# Patient Record
Sex: FEMALE | Race: WHITE | Employment: OTHER | URBAN - METROPOLITAN AREA
[De-identification: names, ages, dates, MRNs, and addresses within clinical notes are randomized per-mention and may not be internally consistent; named-entity substitution may affect disease eponyms.]

---

## 2018-10-22 ENCOUNTER — HOSPITAL ENCOUNTER (OUTPATIENT)
Dept: WOUND CARE | Age: 43
Discharge: HOME | End: 2018-10-22
Attending: PODIATRIST

## 2018-10-22 VITALS
SYSTOLIC BLOOD PRESSURE: 138 MMHG | HEART RATE: 115 BPM | DIASTOLIC BLOOD PRESSURE: 92 MMHG | WEIGHT: 165 LBS | BODY MASS INDEX: 26.63 KG/M2 | RESPIRATION RATE: 16 BRPM

## 2018-10-22 DIAGNOSIS — R52 PAIN: Primary | ICD-10-CM

## 2018-10-22 PROCEDURE — 11045 DBRDMT SUBQ TISS EACH ADDL: CPT

## 2018-10-22 PROCEDURE — 11042 DBRDMT SUBQ TIS 1ST 20SQCM/<: CPT

## 2018-10-22 PROCEDURE — 99213 OFFICE O/P EST LOW 20 MIN: CPT

## 2018-10-22 RX ORDER — LIDOCAINE HYDROCHLORIDE 40 MG/ML
SOLUTION TOPICAL ONCE
Status: DISCONTINUED | OUTPATIENT
Start: 2018-10-22 | End: 2018-10-23 | Stop reason: HOSPADM

## 2018-10-22 RX ORDER — OXYCODONE HYDROCHLORIDE AND ACETAMINOPHEN 5; 325 MG/1; MG/1
1 TABLET ORAL EVERY 6 HOURS PRN
Qty: 20 TABLET | Refills: 0 | Status: SHIPPED | OUTPATIENT
Start: 2018-10-22 | End: 2018-10-27

## 2018-10-22 RX ORDER — GABAPENTIN 100 MG/1
100 CAPSULE ORAL 3 TIMES DAILY
Status: ON HOLD | COMMUNITY
End: 2020-04-25

## 2018-10-22 ASSESSMENT — PAIN DESCRIPTION - PAIN TYPE: TYPE: ACUTE PAIN

## 2018-10-22 ASSESSMENT — PAIN DESCRIPTION - FREQUENCY: FREQUENCY: INTERMITTENT

## 2018-10-22 ASSESSMENT — PAIN DESCRIPTION - LOCATION: LOCATION: LEG

## 2018-10-22 ASSESSMENT — PAIN SCALES - GENERAL: PAINLEVEL_OUTOF10: 4

## 2018-10-22 ASSESSMENT — PAIN DESCRIPTION - ORIENTATION: ORIENTATION: RIGHT

## 2018-10-29 ENCOUNTER — HOSPITAL ENCOUNTER (OUTPATIENT)
Dept: WOUND CARE | Age: 43
Discharge: HOME | End: 2018-10-29
Attending: PODIATRIST

## 2018-10-29 VITALS — DIASTOLIC BLOOD PRESSURE: 99 MMHG | RESPIRATION RATE: 16 BRPM | HEART RATE: 129 BPM | SYSTOLIC BLOOD PRESSURE: 153 MMHG

## 2018-10-29 PROCEDURE — 11042 DBRDMT SUBQ TIS 1ST 20SQCM/<: CPT

## 2018-10-29 RX ORDER — LIDOCAINE HYDROCHLORIDE 40 MG/ML
SOLUTION TOPICAL ONCE
Status: DISCONTINUED | OUTPATIENT
Start: 2018-10-29 | End: 2018-10-30 | Stop reason: HOSPADM

## 2018-10-29 NOTE — PLAN OF CARE
Problem: Wound:  Goal: Will show signs of wound healing; wound closure and no evidence of infection  Will show signs of wound healing; wound closure and no evidence of infection   Outcome: Ongoing  Discharge instructions given. Patient verbalized understanding. Return to [facility] in 1 week.     [] antibiotics    [] X-ray     [] Culture   [x] Debridement      [] HBO Evaluation    [] LABS   [] Vascular Studies []

## 2018-10-29 NOTE — PROGRESS NOTES
2-4 once or twice per week.  lisinopril (PRINIVIL) 10 MG tablet Take 1 tablet by mouth daily. 30 tablet 3    LORazepam (ATIVAN) 0.5 MG tablet Take 1 tablet by mouth every 8 hours as needed for Anxiety. 30 tablet 1    insulin glargine (LANTUS) 100 UNIT/ML injection Inject 45 Units into the skin daily. 5 Pen 3    glucose blood VI test strips (VICTORY AGM-4000 TEST) strip Test four times daily until controlled and then three times daily. Code 250.02  ONE TOUCH ULTRA MONITOR 100 strip 12    insulin lispro (HUMALOG) 100 UNIT/ML injection Inject  into the skin 3 times daily (before meals). SLIDING SCALE SC BEFORE MEALS      Insulin Pen Needle (B-D UF III MINI PEN NEEDLES) 31G X 5 MM MISC by Does not apply route. 100 each 6     No current facility-administered medications on file prior to encounter. REVIEW OF SYSTEMS    Pertinent items are noted in HPI. Objective:      BP (!) 153/99   Pulse 129   Resp 16     PHYSICAL EXAM    General Appearance: alert and oriented to person, place and time, well-developed and well-nourished, in no acute distress  Skin: warm and dry and dorsal R foot ulcer with fibrogranular base. Dry scaling skin noted to R foot. Cardiovascular: normal rate and intact distal pulses  Extremities: no cyanosis, no clubbing and mild edema to foot. Erythema decreased from previous hospital stay. Musculoskeletal: normal range of motion, no joint swelling, deformity or tenderness  Neurologic: gait and coordination normal, speech normal and decreased protective sensation.       Assessment:     Patient Active Problem List   Diagnosis    Diabetes mellitus type 2, uncontrolled ([address]    Mixed hyperlipidemia    Migraine headache    Labial abscess    Hypokalemia    Leukocytosis    Anemia       Procedure Note    Performed by: Maria Eugenia Jackson DPM    Consent obtained: Yes    Time out taken:  Yes    Pain Control: Anesthetic  Anesthetic: 4% Lidocaine Liquid Topical to the treatment plan is paramount to successful healing and prevention of further ulceration and/or infection     Discharge Treatment       With each dressing change, rinse wounds with 0.9% Saline. (May use wound wash or soft contact solution. Both can be purchased at a local drug store). If unable to obtain saline, may use a gentle soap and water. Dressing care: Danielle Obrien on wound in clinic. Alginate ag, dry dressing- change every 2 days.     Written Patient Discharge Instructions Given            Electronically signed by Eliot Saxena DPM on 10/29/2018 at 2:15 PM

## 2018-11-05 ENCOUNTER — HOSPITAL ENCOUNTER (OUTPATIENT)
Dept: WOUND CARE | Age: 43
Discharge: HOME | End: 2018-11-05
Attending: PODIATRIST

## 2018-11-05 VITALS
WEIGHT: 176 LBS | BODY MASS INDEX: 28.41 KG/M2 | DIASTOLIC BLOOD PRESSURE: 84 MMHG | SYSTOLIC BLOOD PRESSURE: 135 MMHG | TEMPERATURE: 97.7 F | HEART RATE: 111 BPM

## 2018-11-05 PROCEDURE — 11042 DBRDMT SUBQ TIS 1ST 20SQCM/<: CPT

## 2018-11-05 RX ORDER — LIDOCAINE HYDROCHLORIDE 40 MG/ML
SOLUTION TOPICAL ONCE
Status: DISCONTINUED | OUTPATIENT
Start: 2018-11-05 | End: 2018-11-06 | Stop reason: HOSPADM

## 2018-11-05 ASSESSMENT — PAIN DESCRIPTION - PAIN TYPE: TYPE: CHRONIC PAIN

## 2018-11-05 ASSESSMENT — PAIN DESCRIPTION - ONSET: ONSET: ON-GOING

## 2018-11-05 ASSESSMENT — PAIN SCALES - GENERAL: PAINLEVEL_OUTOF10: 2

## 2018-11-05 ASSESSMENT — PAIN DESCRIPTION - DESCRIPTORS: DESCRIPTORS: ACHING

## 2018-11-05 ASSESSMENT — PAIN DESCRIPTION - LOCATION: LOCATION: LEG

## 2018-11-05 ASSESSMENT — PAIN DESCRIPTION - ORIENTATION: ORIENTATION: RIGHT

## 2018-11-05 ASSESSMENT — PAIN DESCRIPTION - FREQUENCY: FREQUENCY: INTERMITTENT

## 2018-11-05 NOTE — PROGRESS NOTES
prn:  Possibly 2-4 once or twice per week.  lisinopril (PRINIVIL) 10 MG tablet Take 1 tablet by mouth daily. 30 tablet 3    insulin glargine (LANTUS) 100 UNIT/ML injection Inject 45 Units into the skin daily. 5 Pen 3    glucose blood VI test strips (VICTORY AGM-4000 TEST) strip Test four times daily until controlled and then three times daily. Code 250.02  ONE TOUCH ULTRA MONITOR 100 strip 12    insulin lispro (HUMALOG) 100 UNIT/ML injection Inject  into the skin 3 times daily (before meals). SLIDING SCALE SC BEFORE MEALS      Insulin Pen Needle (B-D UF III MINI PEN NEEDLES) 31G X 5 MM MISC by Does not apply route. 100 each 6     No current facility-administered medications on file prior to encounter. REVIEW OF SYSTEMS    Pertinent items are noted in HPI. Objective:      /84   Pulse 111   Temp 97.7 °F (36.5 °C) (Oral)   Wt 176 lb (79.8 kg)   BMI 28.41 kg/m²     PHYSICAL EXAM    General Appearance: alert and oriented to person, place and time, well-developed and well-nourished, in no acute distress  Skin: warm and dry and dorsal R foot ulcer with fibrogranular base. Dry scaling skin noted to R foot. Cardiovascular: normal rate and intact distal pulses  Extremities: no cyanosis, no clubbing and mild edema to foot. Erythema decreased from previous hospital stay.   Musculoskeletal: normal range of motion, no joint swelling, deformity or tenderness  Neurologic: gait and coordination normal, speech normal and decreased protective sensation.         Assessment:     Patient Active Problem List   Diagnosis    Diabetes mellitus type 2, uncontrolled ([address]    Mixed hyperlipidemia    Migraine headache    Labial abscess    Hypokalemia    Leukocytosis    Anemia       Procedure Note    Performed by: Joe Casillas DPM    Consent obtained: Yes    Time out taken:  Yes    Pain Control: Anesthetic  Anesthetic: 4% Lidocaine Liquid Topical     Debridement:Excisional Debridement    Using curette

## 2018-11-12 ENCOUNTER — HOSPITAL ENCOUNTER (OUTPATIENT)
Dept: WOUND CARE | Age: 43
Discharge: HOME | End: 2018-11-12
Attending: PODIATRIST

## 2018-11-12 VITALS — SYSTOLIC BLOOD PRESSURE: 167 MMHG | DIASTOLIC BLOOD PRESSURE: 92 MMHG | HEART RATE: 113 BPM | RESPIRATION RATE: 16 BRPM

## 2018-11-12 PROCEDURE — 11042 DBRDMT SUBQ TIS 1ST 20SQCM/<: CPT

## 2018-11-12 RX ORDER — LIDOCAINE HYDROCHLORIDE 40 MG/ML
SOLUTION TOPICAL ONCE
Status: DISCONTINUED | OUTPATIENT
Start: 2018-11-12 | End: 2018-11-13 | Stop reason: HOSPADM

## 2018-11-12 ASSESSMENT — PAIN DESCRIPTION - LOCATION: LOCATION: FOOT

## 2018-11-12 ASSESSMENT — PAIN DESCRIPTION - PAIN TYPE: TYPE: CHRONIC PAIN

## 2018-11-12 ASSESSMENT — PAIN SCALES - GENERAL: PAINLEVEL_OUTOF10: 7

## 2018-11-12 NOTE — PROGRESS NOTES
Possibly 2-4 once or twice per week.  lisinopril (PRINIVIL) 10 MG tablet Take 1 tablet by mouth daily. 30 tablet 3    insulin glargine (LANTUS) 100 UNIT/ML injection Inject 45 Units into the skin daily. 5 Pen 3    glucose blood VI test strips (VICTORY AGM-4000 TEST) strip Test four times daily until controlled and then three times daily. Code 250.02  ONE TOUCH ULTRA MONITOR 100 strip 12    insulin lispro (HUMALOG) 100 UNIT/ML injection Inject  into the skin 3 times daily (before meals). SLIDING SCALE SC BEFORE MEALS      Insulin Pen Needle (B-D UF III MINI PEN NEEDLES) 31G X 5 MM MISC by Does not apply route. 100 each 6     No current facility-administered medications on file prior to encounter. REVIEW OF SYSTEMS    Pertinent items are noted in HPI. Objective:      BP (!) 167/92   Pulse 113   Resp 16     PHYSICAL EXAM    General Appearance: alert and oriented to person, place and time, well-developed and well-nourished, in no acute distress  Skin: warm and dry and dorsal R foot ulcer with fibrogranular base. Dry scaling skin noted to R foot. Wound base is more granular. Cardiovascular: normal rate and intact distal pulses  Extremities: no cyanosis, no clubbing and mild edema to foot. Erythema decreased from previous hospital stay. Musculoskeletal: normal range of motion, no joint swelling, deformity or tenderness  Neurologic: gait and coordination normal, speech normal and decreased protective sensation.       Assessment:     Patient Active Problem List   Diagnosis    Diabetes mellitus type 2, uncontrolled ([facility] [address]    Mixed hyperlipidemia    Migraine headache    Labial abscess    Hypokalemia    Leukocytosis    Anemia       Procedure Note    Performed by: Joe Casillas DPM    Consent obtained: Yes    Time out taken:  Yes    Pain Control: Anesthetic  Anesthetic: 4% Lidocaine Liquid Topical     Debridement:Excisional Debridement    Using curette the wound was sharply debrided    down

## 2018-11-19 ENCOUNTER — HOSPITAL ENCOUNTER (OUTPATIENT)
Dept: WOUND CARE | Age: 43
Discharge: HOME | End: 2018-11-19
Attending: PODIATRIST

## 2018-11-19 VITALS — RESPIRATION RATE: 16 BRPM | SYSTOLIC BLOOD PRESSURE: 140 MMHG | DIASTOLIC BLOOD PRESSURE: 86 MMHG | HEART RATE: 113 BPM

## 2018-11-19 PROCEDURE — 11042 DBRDMT SUBQ TIS 1ST 20SQCM/<: CPT

## 2018-11-19 RX ORDER — LIDOCAINE HYDROCHLORIDE 40 MG/ML
SOLUTION TOPICAL ONCE
Status: DISCONTINUED | OUTPATIENT
Start: 2018-11-19 | End: 2018-11-20 | Stop reason: HOSPADM

## 2018-11-26 ENCOUNTER — HOSPITAL ENCOUNTER (OUTPATIENT)
Dept: WOUND CARE | Age: 43
Discharge: HOME | End: 2018-11-26
Attending: PODIATRIST

## 2018-11-26 VITALS
RESPIRATION RATE: 16 BRPM | HEART RATE: 112 BPM | DIASTOLIC BLOOD PRESSURE: 83 MMHG | SYSTOLIC BLOOD PRESSURE: 131 MMHG | TEMPERATURE: 97.2 F

## 2018-11-26 PROCEDURE — 11042 DBRDMT SUBQ TIS 1ST 20SQCM/<: CPT

## 2018-11-26 RX ORDER — LIDOCAINE HYDROCHLORIDE 40 MG/ML
SOLUTION TOPICAL ONCE
Status: DISCONTINUED | OUTPATIENT
Start: 2018-11-26 | End: 2018-11-27 | Stop reason: HOSPADM

## 2018-11-26 NOTE — PROGRESS NOTES
prn:  Possibly 2-4 once or twice per week.  lisinopril (PRINIVIL) 10 MG tablet Take 1 tablet by mouth daily. 30 tablet 3    insulin glargine (LANTUS) 100 UNIT/ML injection Inject 45 Units into the skin daily. 5 Pen 3    glucose blood VI test strips (VICTORY AGM-4000 TEST) strip Test four times daily until controlled and then three times daily. Code 250.02  ONE TOUCH ULTRA MONITOR 100 strip 12    insulin lispro (HUMALOG) 100 UNIT/ML injection Inject  into the skin 3 times daily (before meals). SLIDING SCALE SC BEFORE MEALS      Insulin Pen Needle (B-D UF III MINI PEN NEEDLES) 31G X 5 MM MISC by Does not apply route. 100 each 6     No current facility-administered medications on file prior to encounter. REVIEW OF SYSTEMS    Pertinent items are noted in HPI. Objective:      /83   Pulse 112   Temp 97.2 °F (36.2 °C) (Oral)   Resp 16     PHYSICAL EXAM    General Appearance: alert and oriented to person, place and time, well-developed and well-nourished, in no acute distress  Skin: warm and dry and dorsal R foot ulcer with fibrogranular base. Dry scaling skin noted to R foot. Wound base is more granular. Cardiovascular: normal rate and intact distal pulses  Extremities: no cyanosis, no clubbing and mild edema to foot. Erythema decreased from previous hospital stay. Musculoskeletal: normal range of motion, no joint swelling, deformity or tenderness  Neurologic: gait and coordination normal, speech normal and decreased protective sensation.       Assessment:     Patient Active Problem List   Diagnosis    Diabetes mellitus type 2, uncontrolled ([facility] [address]    Mixed hyperlipidemia    Migraine headache    Labial abscess    Hypokalemia    Leukocytosis    Anemia       Procedure Note    Performed by: Joe Casillas DPM    Consent obtained: Yes    Time out taken:  Yes    Pain Control: Anesthetic  Anesthetic: 4% Lidocaine Liquid Topical     Debridement:Excisional Debridement    Using curette the wound was sharply debrided    down through and including the removal of epidermis, dermis and subcutaneous tissue. Devitalized Tissue Debrided:  fibrin, biofilm, slough and callus    Pre Debridement Measurements:  Are located in the Wound Documentation Flow Sheet    Wound #: 1     Post  Debridement Measurements:  Wound 10/22/18 Foot Right;Dorsal;Lateral #1 (Active)   Wound Image   11/19/2018  1:35 PM   Wound Type Wound 11/26/2018  1:29 PM   Wound Diabetic Gr[place] 2 11/26/2018  1:29 PM   Dressing/Treatment Dry dressing 11/19/2018  2:08 PM   Wound Cleansed Rinsed/Irrigated with saline 11/26/2018  1:50 PM   Wound Length (cm) 2.1 cm 11/26/2018  1:50 PM   Wound Width (cm) 4 cm 11/26/2018  1:50 PM   Wound Depth (cm)  0.1 11/26/2018  1:50 PM   Calculated Wound Size (cm^2) (l*w) 8.4 cm^2 11/26/2018  1:50 PM   Change in Wound Size % (l*w) 70.63 11/26/2018  1:50 PM   Wound Assessment Bleeding 11/26/2018  1:50 PM   Drainage Amount Moderate 11/26/2018  1:50 PM   Drainage Description Yellow 11/26/2018  1:29 PM   Odor None 11/26/2018  1:29 PM   Marianna-wound Assessment Dry 11/26/2018  1:29 PM   [place]%Wound Bed 80 11/26/2018  1:29 PM   Red%Wound Bed 0 11/26/2018  1:29 PM   Yellow%Wound Bed 20 11/26/2018  1:29 PM   Black%Wound Bed 0 11/26/2018  1:29 PM   Purple%Wound Bed 0 11/26/2018  1:29 PM   Other%Wound Bed 0 11/26/2018  1:29 PM   Time out Yes 11/26/2018  1:50 PM   Op First Treatment Date 10/22/18 10/22/2018  1:10 PM   Number of days: 35       Incision 10/05/13 Other (Comment) Left (Active)   Number of days: 1878           Total Surface Area Debrided:  8.4 sq cm     Percentage of wound debrided 100%    Bleeding:  Minimal    Hemostasis Achieved:  by pressure    Procedural Pain:  0  / 10     Post Procedural Pain:  0 / 10     Response to treatment:  Well tolerated by patient. Plan:     The nature of the patient's condition was explained in depth.  The patient was informed that their compliance to the treatment plan is

## 2018-11-26 NOTE — PLAN OF CARE
Problem: Wound:  Goal: Will show signs of wound healing; wound closure and no evidence of infection  Will show signs of wound healing; wound closure and no evidence of infection   Outcome: Ongoing  Discharge instructions given. Patient verbalized understanding. Return to [facility] in 2 weeks.     [] antibiotics    [] X-ray     [] Culture   [x] Debridement      [] HBO Evaluation    [] LABS   [] Vascular Studies []

## 2018-12-10 ENCOUNTER — HOSPITAL ENCOUNTER (OUTPATIENT)
Dept: WOUND CARE | Age: 43
Discharge: HOME | End: 2018-12-10
Attending: PODIATRIST

## 2018-12-10 VITALS
RESPIRATION RATE: 16 BRPM | SYSTOLIC BLOOD PRESSURE: 147 MMHG | DIASTOLIC BLOOD PRESSURE: 86 MMHG | TEMPERATURE: 97.1 F | HEIGHT: 66 IN | BODY MASS INDEX: 28.41 KG/M2 | HEART RATE: 112 BPM

## 2018-12-10 PROCEDURE — 11042 DBRDMT SUBQ TIS 1ST 20SQCM/<: CPT

## 2018-12-10 RX ORDER — LIDOCAINE HYDROCHLORIDE 40 MG/ML
SOLUTION TOPICAL ONCE
Status: DISCONTINUED | OUTPATIENT
Start: 2018-12-10 | End: 2018-12-11 | Stop reason: HOSPADM

## 2018-12-10 NOTE — PLAN OF CARE
Problem: Wound:  Goal: Will show signs of wound healing; wound closure and no evidence of infection  Will show signs of wound healing; wound closure and no evidence of infection   Outcome: Ongoing  Discharge instructions given. Patient verbalized understanding. Return to [facility] in 1 week.       [] antibiotics    [] X-ray     [] Culture   [x] Debridement      [] HBO Evaluation    [] LABS   [] Vascular Studies []

## 2018-12-10 NOTE — PROGRESS NOTES
Jose J Burrows  Progress Note and Procedure Note      Connie Oshea  AGE: 37 y.o. GENDER: female  : 1975  TODAY'S DATE:  12/10/2018    Subjective:     Chief Complaint   Patient presents with    Wound Check     right foot         HISTORY of PRESENT ILLNESS HPI     Connie Oshea is a 37 y.o. female who presents today for wound evaluation. History of Wound: Dorsal R foot ulcer  Wound Pain:  intermittent  Severity:  1 / 10   Wound Type:  diabetic  Modifying Factors:  diabetes, poor glucose control and shear force  Associated Signs/Symptoms:  drainage and pain        PAST MEDICAL HISTORY        Diagnosis Date    [facility] mellitus out of [facility] [address]     Diabetes mellitus, type II ([facility] [address]         Generalized headaches     Infertility     Insomnia     chronic vs lack of time spent to sleep    Migraine headache 2011    Mixed hyperlipidemia     Otitis media     h/o recurrent    Pelvic abscess in female 10/5/2013    Pneumonia     2004 approx. PAST SURGICAL HISTORY    Past Surgical History:   Procedure Laterality Date    CERVIX SURGERY      laser tx for dysplasia;       FAMILY HISTORY    Family History   Problem Relation Age of Onset    Diabetes Mother     Diabetes Father     High Blood Pressure Father     Cancer Father         colon    Diabetes Sister     Diabetes Paternal Grandmother        SOCIAL HISTORY    Social History   Substance Use Topics    Smoking status: Current Every Day Smoker     Packs/day: 1.00     Types: Cigarettes    Smokeless tobacco: Never Used    Alcohol use No      Comment: Very Rare       ALLERGIES    Allergies   Allergen Reactions    Amoxicillin     Levofloxacin        MEDICATIONS    Current Outpatient Prescriptions on File Prior to Encounter   Medication Sig Dispense Refill    gabapentin (NEURONTIN) 100 MG capsule Take 100 mg by mouth 3 times daily. Peri Mcgee Naproxen Sodium (ALEVE) 220 MG CAPS Take 220 mg by mouth as needed.  Takes nature of the patient's condition was explained in depth. The patient was informed that their compliance to the treatment plan is paramount to successful healing and prevention of further ulceration and/or infection     Discharge Treatment       With each dressing change, rinse wounds with 0.9% Saline. (May use wound wash or soft contact solution. Both can be purchased at a local drug store). If unable to obtain saline, may use a gentle soap and water.     Dressing care: Collagen powder in wound in clinic. At home: Use Collagen until gone then resume Alginate ag, dry dressing- change every 2 days.     Written Patient Discharge Instructions Given            Electronically signed by Maria Eugenia Jackson DPM on 12/10/2018 at 1:48 PM

## 2018-12-24 ENCOUNTER — HOSPITAL ENCOUNTER (OUTPATIENT)
Dept: WOUND CARE | Age: 43
Discharge: HOME | End: 2018-12-24
Attending: PODIATRIST

## 2018-12-24 VITALS
TEMPERATURE: 97.4 F | BODY MASS INDEX: 28.41 KG/M2 | DIASTOLIC BLOOD PRESSURE: 87 MMHG | WEIGHT: 176 LBS | SYSTOLIC BLOOD PRESSURE: 145 MMHG

## 2018-12-24 PROCEDURE — 11042 DBRDMT SUBQ TIS 1ST 20SQCM/<: CPT

## 2018-12-24 RX ORDER — LIDOCAINE HYDROCHLORIDE 40 MG/ML
SOLUTION TOPICAL ONCE
Status: DISCONTINUED | OUTPATIENT
Start: 2018-12-24 | End: 2018-12-25 | Stop reason: HOSPADM

## 2018-12-24 NOTE — PROGRESS NOTES
Jose J   Progress Note and Procedure Note      Stefanie Quarles  AGE: 37 y.o. GENDER: female  : 1975  TODAY'S DATE:  2018    Subjective:     Chief Complaint   Patient presents with    Wound Check     right wound F/U         HISTORY of PRESENT ILLNESS HPI     Stefanie Quarles is a 37 y.o. female who presents today for wound evaluation. History of Wound: R foot ulcer  Wound Pain:  intermittent  Severity:  1 / 10   Wound Type:  diabetic  Modifying Factors:  diabetes, poor glucose control and shear force  Associated Signs/Symptoms:  edema and numbness        PAST MEDICAL HISTORY        Diagnosis Date    Diabetes mellitus out of control ([facility] [address]     Diabetes mellitus, type II ([facility] [address]         Generalized headaches     Infertility     Insomnia     chronic vs lack of time spent to sleep    Migraine headache 2011    Mixed hyperlipidemia     Otitis media     h/o recurrent    Pelvic abscess in female 10/5/2013    Pneumonia     2004 approx. PAST SURGICAL HISTORY    Past Surgical History:   Procedure Laterality Date    CERVIX SURGERY      laser tx for dysplasia;       FAMILY HISTORY    Family History   Problem Relation Age of Onset    Diabetes Mother     Diabetes Father     High Blood Pressure Father     Cancer Father         colon    Diabetes Sister     Diabetes Paternal Grandmother        SOCIAL HISTORY    Social History   Substance Use Topics    Smoking status: Current Every Day Smoker     Packs/day: 1.00     Types: Cigarettes    Smokeless tobacco: Never Used    Alcohol use No      Comment: Very Rare       ALLERGIES    Allergies   Allergen Reactions    Amoxicillin     Levofloxacin        MEDICATIONS    Current Outpatient Prescriptions on File Prior to Encounter   Medication Sig Dispense Refill    gabapentin (NEURONTIN) 100 MG capsule Take 100 mg by mouth 3 times daily. Maggy Sumner Naproxen Sodium (ALEVE) 220 MG CAPS Take 220 mg by mouth as needed.  Takes

## 2018-12-24 NOTE — PLAN OF CARE
Problem: Wound:  Goal: Will show signs of wound healing; wound closure and no evidence of infection  Will show signs of wound healing; wound closure and no evidence of infection   Outcome: Ongoing  Discharge instructions given. Patient verbalized understanding. Return to [facility] in 2 weeks.     [] antibiotics    [] X-ray     [] Culture   [x] Debridement      [] HBO Evaluation    [] LABS   [] Vascular Studies []

## 2019-01-07 ENCOUNTER — HOSPITAL ENCOUNTER (OUTPATIENT)
Dept: WOUND CARE | Age: 44
Discharge: HOME | End: 2019-01-07
Attending: PODIATRIST

## 2019-01-07 VITALS — DIASTOLIC BLOOD PRESSURE: 88 MMHG | SYSTOLIC BLOOD PRESSURE: 146 MMHG | TEMPERATURE: 98 F | HEART RATE: 128 BPM

## 2019-01-07 PROCEDURE — 99212 OFFICE O/P EST SF 10 MIN: CPT

## 2019-04-21 ENCOUNTER — APPOINTMENT (OUTPATIENT)
Dept: GENERAL RADIOLOGY | Age: 44
DRG: 982 | End: 2019-04-21

## 2019-04-21 ENCOUNTER — HOSPITAL ENCOUNTER (INPATIENT)
Age: 44
LOS: 4 days | Discharge: HOME | DRG: 982 | End: 2019-04-25
Attending: EMERGENCY MEDICINE | Admitting: FAMILY MEDICINE

## 2019-04-21 DIAGNOSIS — M86.9 OSTEOMYELITIS OF LEFT FOOT, UNSPECIFIED TYPE (HCC): Primary | ICD-10-CM

## 2019-04-21 DIAGNOSIS — L08.9 INFECTED ULCER OF SKIN, UNSPECIFIED ULCER STAGE (HCC): ICD-10-CM

## 2019-04-21 DIAGNOSIS — I10 ESSENTIAL HYPERTENSION: ICD-10-CM

## 2019-04-21 DIAGNOSIS — R73.9 HYPERGLYCEMIA: ICD-10-CM

## 2019-04-21 DIAGNOSIS — L98.499 INFECTED ULCER OF SKIN, UNSPECIFIED ULCER STAGE (HCC): ICD-10-CM

## 2019-04-21 PROBLEM — E11.628 DIABETIC FOOT INFECTION (HCC): Status: ACTIVE | Noted: 2019-04-21

## 2019-04-21 LAB
A/G RATIO: 1 (ref 1.1–2.2)
ALBUMIN SERPL-MCNC: 3.8 G/DL (ref 3.4–5)
ALP BLD-CCNC: 170 U/L (ref 40–129)
ALT SERPL-CCNC: 14 U/L (ref 10–40)
ANION GAP SERPL CALCULATED.3IONS-SCNC: 11 MMOL/L (ref 3–16)
AST SERPL-CCNC: 16 U/L (ref 15–37)
BASE EXCESS VENOUS: 3.3 MMOL/L (ref -3–3)
BASOPHILS ABSOLUTE: 0.1 K/UL (ref 0–0.2)
BASOPHILS RELATIVE PERCENT: 0.5 %
BETA-HYDROXYBUTYRATE: 0.2 MMOL/L (ref 0–0.27)
BILIRUB SERPL-MCNC: <0.2 MG/DL (ref 0–1)
BUN BLDV-MCNC: 14 MG/DL (ref 7–20)
CALCIUM SERPL-MCNC: 9.3 MG/DL (ref 8.3–10.6)
CARBOXYHEMOGLOBIN: 7.6 % (ref 0–1.5)
CHLORIDE BLD-SCNC: 94 MMOL/L (ref 99–110)
CO2: 28 MMOL/L (ref 21–32)
CREAT SERPL-MCNC: 0.9 MG/DL (ref 0.6–1.1)
EOSINOPHILS ABSOLUTE: 0.3 K/UL (ref 0–0.6)
EOSINOPHILS RELATIVE PERCENT: 1.7 %
GFR AFRICAN AMERICAN: >60
GFR NON-AFRICAN AMERICAN: >60
GLOBULIN: 3.8 G/DL
GLUCOSE BLD-MCNC: 303 MG/DL (ref 70–99)
GLUCOSE BLD-MCNC: 368 MG/DL (ref 70–99)
GLUCOSE BLD-MCNC: 467 MG/DL (ref 70–99)
GLUCOSE BLD-MCNC: 504 MG/DL (ref 70–99)
HCO3 VENOUS: 28.7 MMOL/L (ref 23–29)
HCT VFR BLD CALC: 39.8 % (ref 36–48)
HEMOGLOBIN, VEN, REDUCED: 10 %
HEMOGLOBIN: 13.1 G/DL (ref 12–16)
LACTIC ACID: 1.3 MMOL/L (ref 0.4–2)
LYMPHOCYTES ABSOLUTE: 2.8 K/UL (ref 1–5.1)
LYMPHOCYTES RELATIVE PERCENT: 17.9 %
MCH RBC QN AUTO: 26.6 PG (ref 26–34)
MCHC RBC AUTO-ENTMCNC: 32.8 G/DL (ref 31–36)
MCV RBC AUTO: 80.9 FL (ref 80–100)
METHEMOGLOBIN VENOUS: 0.5 %
MONOCYTES ABSOLUTE: 1 K/UL (ref 0–1.3)
MONOCYTES RELATIVE PERCENT: 6.4 %
NEUTROPHILS ABSOLUTE: 11.5 K/UL (ref 1.7–7.7)
NEUTROPHILS RELATIVE PERCENT: 73.5 %
O2 CONTENT, VEN: 15 VOL %
O2 SAT, VEN: 90 %
O2 THERAPY: ABNORMAL
PCO2, VEN: 46.1 MMHG (ref 40–50)
PDW BLD-RTO: 15.5 % (ref 12.4–15.4)
PERFORMED ON: ABNORMAL
PH VENOUS: 7.4 (ref 7.35–7.45)
PLATELET # BLD: 350 K/UL (ref 135–450)
PMV BLD AUTO: 8 FL (ref 5–10.5)
PO2, VEN: 54.8 MMHG (ref 25–40)
POTASSIUM SERPL-SCNC: 4.2 MMOL/L (ref 3.5–5.1)
RBC # BLD: 4.93 M/UL (ref 4–5.2)
SODIUM BLD-SCNC: 133 MMOL/L (ref 136–145)
TCO2 CALC VENOUS: 68 MMOL/L
TOTAL PROTEIN: 7.6 G/DL (ref 6.4–8.2)
WBC # BLD: 15.6 K/UL (ref 4–11)

## 2019-04-21 PROCEDURE — 80053 COMPREHEN METABOLIC PANEL: CPT

## 2019-04-21 PROCEDURE — 96375 TX/PRO/DX INJ NEW DRUG ADDON: CPT

## 2019-04-21 PROCEDURE — 99284 EMERGENCY DEPT VISIT MOD MDM: CPT

## 2019-04-21 PROCEDURE — 6360000002 HC RX W HCPCS: Performed by: PHYSICIAN ASSISTANT

## 2019-04-21 PROCEDURE — 82010 KETONE BODYS QUAN: CPT

## 2019-04-21 PROCEDURE — 96365 THER/PROPH/DIAG IV INF INIT: CPT

## 2019-04-21 PROCEDURE — 96361 HYDRATE IV INFUSION ADD-ON: CPT

## 2019-04-21 PROCEDURE — 2580000003 HC RX 258: Performed by: PHYSICIAN ASSISTANT

## 2019-04-21 PROCEDURE — 6370000000 HC RX 637 (ALT 250 FOR IP): Performed by: FAMILY MEDICINE

## 2019-04-21 PROCEDURE — 87040 BLOOD CULTURE FOR BACTERIA: CPT

## 2019-04-21 PROCEDURE — 2580000003 HC RX 258: Performed by: FAMILY MEDICINE

## 2019-04-21 PROCEDURE — 82803 BLOOD GASES ANY COMBINATION: CPT

## 2019-04-21 PROCEDURE — 1200000000 HC SEMI PRIVATE

## 2019-04-21 PROCEDURE — 85025 COMPLETE CBC W/AUTO DIFF WBC: CPT

## 2019-04-21 PROCEDURE — 6360000002 HC RX W HCPCS: Performed by: FAMILY MEDICINE

## 2019-04-21 PROCEDURE — 73630 X-RAY EXAM OF FOOT: CPT

## 2019-04-21 PROCEDURE — 83036 HEMOGLOBIN GLYCOSYLATED A1C: CPT

## 2019-04-21 PROCEDURE — 83605 ASSAY OF LACTIC ACID: CPT

## 2019-04-21 RX ORDER — NICOTINE POLACRILEX 4 MG
15 LOZENGE BUCCAL PRN
Status: DISCONTINUED | OUTPATIENT
Start: 2019-04-21 | End: 2019-04-25 | Stop reason: HOSPADM

## 2019-04-21 RX ORDER — SODIUM CHLORIDE 0.9 % (FLUSH) 0.9 %
10 SYRINGE (ML) INJECTION EVERY 12 HOURS SCHEDULED
Status: DISCONTINUED | OUTPATIENT
Start: 2019-04-21 | End: 2019-04-25 | Stop reason: HOSPADM

## 2019-04-21 RX ORDER — ACETAMINOPHEN 325 MG/1
650 TABLET ORAL EVERY 4 HOURS PRN
Status: DISCONTINUED | OUTPATIENT
Start: 2019-04-21 | End: 2019-04-25 | Stop reason: HOSPADM

## 2019-04-21 RX ORDER — VANCOMYCIN HYDROCHLORIDE 1 G/200ML
1000 INJECTION, SOLUTION INTRAVENOUS ONCE
Status: COMPLETED | OUTPATIENT
Start: 2019-04-21 | End: 2019-04-21

## 2019-04-21 RX ORDER — ONDANSETRON 2 MG/ML
4 INJECTION INTRAMUSCULAR; INTRAVENOUS EVERY 6 HOURS PRN
Status: DISCONTINUED | OUTPATIENT
Start: 2019-04-21 | End: 2019-04-25 | Stop reason: HOSPADM

## 2019-04-21 RX ORDER — VANCOMYCIN HYDROCHLORIDE 1 G/200ML
1000 INJECTION, SOLUTION INTRAVENOUS EVERY 12 HOURS
Status: DISCONTINUED | OUTPATIENT
Start: 2019-04-22 | End: 2019-04-22

## 2019-04-21 RX ORDER — LISINOPRIL 10 MG/1
10 TABLET ORAL DAILY
Status: DISCONTINUED | OUTPATIENT
Start: 2019-04-22 | End: 2019-04-25 | Stop reason: HOSPADM

## 2019-04-21 RX ORDER — 0.9 % SODIUM CHLORIDE 0.9 %
30 INTRAVENOUS SOLUTION INTRAVENOUS ONCE
Status: COMPLETED | OUTPATIENT
Start: 2019-04-21 | End: 2019-04-21

## 2019-04-21 RX ORDER — SODIUM CHLORIDE 0.9 % (FLUSH) 0.9 %
10 SYRINGE (ML) INJECTION PRN
Status: DISCONTINUED | OUTPATIENT
Start: 2019-04-21 | End: 2019-04-25 | Stop reason: HOSPADM

## 2019-04-21 RX ORDER — DIPHENHYDRAMINE HYDROCHLORIDE 50 MG/ML
25 INJECTION INTRAMUSCULAR; INTRAVENOUS ONCE
Status: COMPLETED | OUTPATIENT
Start: 2019-04-21 | End: 2019-04-21

## 2019-04-21 RX ORDER — MORPHINE SULFATE 4 MG/ML
4 INJECTION, SOLUTION INTRAMUSCULAR; INTRAVENOUS EVERY 4 HOURS PRN
Status: DISCONTINUED | OUTPATIENT
Start: 2019-04-21 | End: 2019-04-25 | Stop reason: HOSPADM

## 2019-04-21 RX ORDER — DEXTROSE MONOHYDRATE 25 G/50ML
12.5 INJECTION, SOLUTION INTRAVENOUS PRN
Status: DISCONTINUED | OUTPATIENT
Start: 2019-04-21 | End: 2019-04-25 | Stop reason: HOSPADM

## 2019-04-21 RX ORDER — GABAPENTIN 100 MG/1
100 CAPSULE ORAL 3 TIMES DAILY
Status: DISCONTINUED | OUTPATIENT
Start: 2019-04-21 | End: 2019-04-25 | Stop reason: HOSPADM

## 2019-04-21 RX ORDER — DEXTROSE MONOHYDRATE 50 MG/ML
100 INJECTION, SOLUTION INTRAVENOUS PRN
Status: DISCONTINUED | OUTPATIENT
Start: 2019-04-21 | End: 2019-04-25 | Stop reason: HOSPADM

## 2019-04-21 RX ADMIN — SODIUM CHLORIDE 2286 ML: 9 INJECTION, SOLUTION INTRAVENOUS at 19:43

## 2019-04-21 RX ADMIN — VANCOMYCIN HYDROCHLORIDE 1000 MG: 1 INJECTION, SOLUTION INTRAVENOUS at 21:05

## 2019-04-21 RX ADMIN — Medication 10 ML: at 22:48

## 2019-04-21 RX ADMIN — MORPHINE SULFATE 4 MG: 4 INJECTION INTRAVENOUS at 22:47

## 2019-04-21 RX ADMIN — INSULIN LISPRO 4 UNITS: 100 INJECTION, SOLUTION INTRAVENOUS; SUBCUTANEOUS at 22:58

## 2019-04-21 RX ADMIN — DIPHENHYDRAMINE HYDROCHLORIDE 25 MG: 50 INJECTION, SOLUTION INTRAMUSCULAR; INTRAVENOUS at 21:34

## 2019-04-21 ASSESSMENT — ENCOUNTER SYMPTOMS
WHEEZING: 0
VOMITING: 0
NAUSEA: 0
RHINORRHEA: 0
COUGH: 0
SHORTNESS OF BREATH: 0
ABDOMINAL PAIN: 0
DIARRHEA: 0

## 2019-04-21 ASSESSMENT — PAIN DESCRIPTION - ORIENTATION: ORIENTATION: LEFT

## 2019-04-21 ASSESSMENT — PAIN SCALES - GENERAL
PAINLEVEL_OUTOF10: 7
PAINLEVEL_OUTOF10: 9
PAINLEVEL_OUTOF10: 7

## 2019-04-21 ASSESSMENT — PAIN DESCRIPTION - LOCATION
LOCATION: TOE (COMMENT WHICH ONE)
LOCATION: TOE (COMMENT WHICH ONE)

## 2019-04-21 ASSESSMENT — PAIN DESCRIPTION - PAIN TYPE
TYPE: ACUTE PAIN
TYPE: ACUTE PAIN

## 2019-04-21 NOTE — ED NOTES
Pt is a/o x 4 states she had a callus on her left foot that broke open Friday. Pt states she work 10 hours shift on her feet. Pt left great toe noted infection ulcerative lesion to medial left great toe.  Yvette Pat place PIV to left ac.      Raul Campos RN  04/21/19 [id number]

## 2019-04-21 NOTE — ED PROVIDER NOTES
[address] [facility] ED  eMERGENCY dEPARTMENT eNCOUnter        Pt Name: Vanessa Guadarrama  MRN: [id number]  [place] 1975  Date of evaluation: 4/21/2019  Provider: Jeromy Mullen PA-C  PCP: No primary care provider on file. This patient was seen and evaluated by the attending physician Dr. Anthony Nava. CHIEF COMPLAINT       Chief Complaint   Patient presents with    Toe Pain     left foot infection, odor started Friday. HISTORY OF PRESENT ILLNESS   (Location/Symptom, Timing/Onset, Context/Setting, Quality, Duration, Modifying Factors, Severity)  Note limiting factors. Vanessa Guadarrama is a 37 y.o. female who presents for evaluation of infection to her left great toe. Patient states that she had first noticed a callus on the medial aspect of her left great toe 2 days ago that had busted open. She states that she cut the area dressed and when she did the dressing off today after work, noticed foul odor with significant infection. She reports drainage that was like cottage cheese. There is streaking going up her foot. She denies any known fevers or chills. No abdominal pain, nausea or vomiting. She is a known diabetic and states that she does not have insurance. She has not been able to afford her insulin or other medications and reports sugars have been running high. She is noncompliant. No new numbness, tingling or weakness distally. She is having pain with ambulation. No other complaints or concerns at this time. Nursing Notes were all reviewed and agreed with or any disagreements were addressed  in the HPI. REVIEW OF SYSTEMS    (2-9 systems for level 4, 10 or more for level 5)     Review of Systems   Constitutional: Negative for appetite change, chills and fever. HENT: Negative for congestion and rhinorrhea. Respiratory: Negative for cough, shortness of breath and wheezing. Cardiovascular: Negative for chest pain.    Gastrointestinal: Negative for abdominal pain, diarrhea, nausea and vomiting. Genitourinary: Negative for difficulty urinating, dysuria and hematuria. Musculoskeletal: Negative for neck pain and neck stiffness. Skin: Positive for wound. Negative for rash. Neurological: Negative for weakness, numbness and headaches. Positives and Pertinent negatives as per HPI. Except as noted abovein the ROS, all other systems were reviewed and negative. PAST MEDICAL HISTORY     Past Medical History:   Diagnosis Date    Diabetes mellitus out of control ([facility]     Diabetes mellitus, type II ([facility]     2005    Generalized headaches     Infertility     Insomnia     chronic vs lack of time spent to sleep    Migraine headache 11/9/2011    Mixed hyperlipidemia     Otitis media     h/o recurrent    Pelvic abscess in female 10/5/2013    Pneumonia     2004 approx. SURGICAL HISTORY     Past Surgical History:   Procedure Laterality Date    CERVIX SURGERY      laser tx for dysplasia;1992         CURRENTMEDICATIONS       Previous Medications    GABAPENTIN (NEURONTIN) 100 MG CAPSULE    Take 100 mg by mouth 3 times daily. [email or web address] GLUCOSE BLOOD VI TEST STRIPS (VICTORY AGM-4000 TEST) STRIP    Test four times daily until controlled and then three times daily. Code 250.02  ONE TOUCH ULTRA MONITOR    INSULIN GLARGINE (LANTUS) 100 UNIT/ML INJECTION    Inject 45 Units into the skin daily. INSULIN LISPRO (HUMALOG) 100 UNIT/ML INJECTION    Inject  into the skin 3 times daily (before meals). SLIDING SCALE SC BEFORE MEALS    INSULIN PEN NEEDLE (B-D UF III MINI PEN NEEDLES) 31G X 5 MM MISC    by Does not apply route. LISINOPRIL (PRINIVIL) 10 MG TABLET    Take 1 tablet by mouth daily. NAPROXEN SODIUM (ALEVE) 220 MG CAPS    Take 220 mg by mouth as needed. Takes prn:  Possibly 2-4 once or twice per week.          ALLERGIES     Amoxicillin and Levofloxacin    FAMILYHISTORY       Family History   Problem Relation Age of Onset    Diabetes Mother     Diabetes Father     High Blood Pressure Father     Cancer Father         colon    Diabetes Sister     Diabetes Paternal Grandmother           SOCIAL HISTORY       Social History     Socioeconomic History    Marital status:      Spouse name: Ruby Garcia Number of children: 0    Years of education: None    Highest education level: None   Occupational History    Occupation: works as    Social Needs    Financial resource strain: None    Food insecurity:     Worry: None     Inability: None    Transportation needs:     Medical: None     Non-medical: None   Tobacco Use    Smoking status: Current Every Day Smoker     Packs/day: 1.00     Types: Cigarettes    Smokeless tobacco: Never Used   Substance and Sexual Activity    Alcohol use: No     Comment: Very Rare    Drug use: No    Sexual activity: Yes     Partners: Male   Lifestyle    Physical activity:     Days per week: None     Minutes per session: None    Stress: None   Relationships    Social connections:     Talks on phone: None     Gets together: None     Attends Tenriism service: None     Active member of club or organization: None     Attends meetings of clubs or organizations: None     Relationship status: None    Intimate partner violence:     Fear of current or ex partner: None     Emotionally abused: None     Physically abused: None     Forced sexual activity: None   Other Topics Concern    None   Social History Narrative    None       SCREENINGS             PHYSICAL EXAM    (up to 7 for level 4, 8 or more for level 5)     ED Triage Vitals   BP Temp Temp src Pulse Resp SpO2 Height Weight   04/21/19 1843 04/21/19 1839 -- 04/21/19 1843 04/21/19 1843 04/21/19 1843 04/21/19 1843 04/21/19 1843   (!) 212/107 99.3 °F (37.4 °C)  126 16 95 % 5' 7\" (1.702 m) 168 lb (76.2 kg)       Physical Exam   Constitutional: She is oriented to person, place, and time. She appears well-developed and well-nourished.    HENT:   Head: Normocephalic and atraumatic. Right Ear: External ear normal.   Left Ear: External ear normal.   Nose: Nose normal.   Eyes: Right eye exhibits no discharge. Left eye exhibits no discharge. Neck: Normal range of motion. Neck supple. Cardiovascular: Regular rhythm and normal heart sounds. Tachycardia present. No murmur heard. Pulmonary/Chest: Effort normal and breath sounds normal. No stridor. No respiratory distress. She has no wheezes. Musculoskeletal: Normal range of motion. Neurological: She is alert and oriented to person, place, and time. Skin: Skin is warm and dry. Lesion noted. She is not diaphoretic. See pics   Psychiatric: She has a normal mood and affect. Her behavior is normal.   Nursing note and vitals reviewed.               DIAGNOSTIC RESULTS   LABS:    Labs Reviewed   COMPREHENSIVE METABOLIC PANEL - Abnormal; Notable for the following components:       Result Value    Sodium 133 (*)     Chloride 94 (*)     Glucose 504 (*)     Albumin/Globulin Ratio 1.0 (*)     Alkaline Phosphatase 170 (*)     All other components within normal limits    Narrative:     Performed at:  [facility] [address], [place], [address]   Phone [phone]   CBC WITH AUTO DIFFERENTIAL - Abnormal; Notable for the following components:    WBC 15.6 (*)     RDW 15.5 (*)     Neutrophils # 11.5 (*)     All other components within normal limits    Narrative:     Performed at:  [facility] [address], [place], [address]   Phone [phone]   BLOOD GAS, VENOUS - Abnormal; Notable for the following components:    pO2, Dayne 54.8 (*)     Base Excess, Dayne 3.3 (*)     Carboxyhemoglobin 7.6 (*)     All other components within normal limits    Narrative:     Performed at:  [facility] [address], [place], [facility] [address]   Phone [phone]   POCT GLUCOSE - Abnormal; Notable for the following components:    POC Glucose 467 (*) All other components within normal limits    Narrative:     Performed at:  [facility]  [address] [facility]  [place], [address]   Phone [phone]   POCT GLUCOSE - Abnormal; Notable for the following components:    POC Glucose 368 (*)     All other components within normal limits    Narrative:     Performed at:  [facility]  [address],  [place], [address]   Phone [phone]   CULTURE BLOOD #1   CULTURE BLOOD #2   LACTIC ACID, PLASMA    Narrative:     Performed at:  [facility] [address] [facility],  [place], [address]   Phone [phone]   BETA-HYDROXYBUTYRATE    Narrative:     Performed at:  [facility] [address] [facility]  [place], [address]   Phone [phone]   POCT GLUCOSE       All other labs were within normal range or not returned as of this dictation. EKG: All EKG's are interpreted by the Emergency Department Physician who either signs orCo-signs this chart in the absence of a cardiologist.  Please see their note for interpretation of EKG. RADIOLOGY:   Non-plain film images such as CT, Ultrasound and MRI are read by the radiologist. Plain radiographic images are visualized andpreliminarily interpreted by the  ED Provider with the below findings:        Interpretation [facility] Radiologist below, if available at the time of this note:    XR FOOT LEFT (MIN 3 VIEWS)   Final Result   Soft tissue defect and soft tissue edema of the 1st digit. No evidence for   osteomyelitis. Xr Foot Left (min 3 Views)    Result Date: 4/21/2019  EXAMINATION: 3 XRAY VIEWS OF THE LEFT FOOT 4/21/2019 6:50 pm COMPARISON: None. HISTORY: ORDERING SYSTEM PROVIDED HISTORY: pain TECHNOLOGIST PROVIDED HISTORY: Reason for exam:->pain Ordering Physician Provided Reason for Exam: OPEN WOUND WITH ODOR LEFT GREAT TOE MEDIAL ASPECT.  HISTORY OF DIABETES Acuity: Acute Type of Exam: Initial FINDINGS: Three views of the left foot are submitted for review. No acute fracture or dislocation identified. Bony mineralization is normal for age. Soft tissue defect overlying the 1st digit. No evidence for underlying osteomyelitis. Soft tissue defect and soft tissue edema of the 1st digit. No evidence for osteomyelitis. PROCEDURES   Unless otherwise noted below, none     Procedures    CRITICAL CARE TIME   N/A    CONSULTS:  IP CONSULT TO HOSPITALIST  PHARMACY TO DOSE VANCOMYCIN  IP CONSULT TO PODIATRY      EMERGENCY DEPARTMENT COURSE and DIFFERENTIALDIAGNOSIS/MDM:   Vitals:    Vitals:    04/21/19 1843 04/21/19 1955 04/21/19 2000 04/21/19 2100   BP: (!) 212/107 (!) 157/75 (!) 167/91 (!) 177/92   Pulse: 126  115 108   Resp: 16      Temp:       SpO2: 95%  97% 99%   Weight: 168 lb (76.2 kg)      Height: 5' 7\" (1.702 m)          Patient was given thefollowing medications:  Medications   diphenhydrAMINE (BENADRYL) injection 25 mg (has no administration in time range)   0.9 % sodium chloride bolus (0 mLs Intravenous Stopped 4/21/19 2131)   vancomycin (VANCOCIN) 1000 mg in dextrose 5% 200 mL IVPB (0 mg Intravenous Stopped 4/21/19 2131)       Patient presents for evaluation of infection to her left great toe. On exam, she is tearful and appears uncomfortable but is no acute distress and nontoxic. She is markedly hypertensive and tachycardic with a temperature of 99.3. Vitals otherwise stable. Lungs clear to auscultation bilaterally. Abdomen is benign. She does have ulcerative lesion noted to the medial aspect of the left great toe as illustrated above with active malodorous drainage, surrounding erythema and streaking. Initial glucose 467. Patient was started on 30 mL/kg normal saline fluid resuscitation for suspected sepsis and to help with hyperglycemia and will be reevaluated. CBC and CMP are remarkable for leukocytosis of 15.6 and hyperglycemia at 504. No sign of DKA. Lactic acid 1.3.  Blood

## 2019-04-22 ENCOUNTER — APPOINTMENT (OUTPATIENT)
Dept: MRI IMAGING | Age: 44
DRG: 982 | End: 2019-04-22

## 2019-04-22 LAB
ESTIMATED AVERAGE GLUCOSE: 337.9 MG/DL
GLUCOSE BLD-MCNC: 141 MG/DL (ref 70–99)
GLUCOSE BLD-MCNC: 202 MG/DL (ref 70–99)
GLUCOSE BLD-MCNC: 257 MG/DL (ref 70–99)
GLUCOSE BLD-MCNC: 281 MG/DL (ref 70–99)
HBA1C MFR BLD: 13.4 %
PERFORMED ON: ABNORMAL

## 2019-04-22 PROCEDURE — 87077 CULTURE AEROBIC IDENTIFY: CPT

## 2019-04-22 PROCEDURE — 2580000003 HC RX 258: Performed by: INTERNAL MEDICINE

## 2019-04-22 PROCEDURE — 1200000000 HC SEMI PRIVATE

## 2019-04-22 PROCEDURE — 6360000002 HC RX W HCPCS: Performed by: INTERNAL MEDICINE

## 2019-04-22 PROCEDURE — 2580000003 HC RX 258

## 2019-04-22 PROCEDURE — 6360000004 HC RX CONTRAST MEDICATION: Performed by: INTERNAL MEDICINE

## 2019-04-22 PROCEDURE — 6370000000 HC RX 637 (ALT 250 FOR IP): Performed by: FAMILY MEDICINE

## 2019-04-22 PROCEDURE — 87070 CULTURE OTHR SPECIMN AEROBIC: CPT

## 2019-04-22 PROCEDURE — 0KBW0ZZ EXCISION OF LEFT FOOT MUSCLE, OPEN APPROACH: ICD-10-PCS | Performed by: PODIATRIST

## 2019-04-22 PROCEDURE — 73720 MRI LWR EXTREMITY W/O&W/DYE: CPT

## 2019-04-22 PROCEDURE — 6370000000 HC RX 637 (ALT 250 FOR IP): Performed by: PODIATRIST

## 2019-04-22 PROCEDURE — 6360000002 HC RX W HCPCS: Performed by: FAMILY MEDICINE

## 2019-04-22 PROCEDURE — A9579 GAD-BASE MR CONTRAST NOS,1ML: HCPCS | Performed by: INTERNAL MEDICINE

## 2019-04-22 PROCEDURE — 87205 SMEAR GRAM STAIN: CPT

## 2019-04-22 PROCEDURE — 86403 PARTICLE AGGLUT ANTBDY SCRN: CPT

## 2019-04-22 PROCEDURE — 87186 SC STD MICRODIL/AGAR DIL: CPT

## 2019-04-22 PROCEDURE — 2580000003 HC RX 258: Performed by: FAMILY MEDICINE

## 2019-04-22 RX ORDER — GENTAMICIN SULFATE 1 MG/G
CREAM TOPICAL DAILY
Status: DISCONTINUED | OUTPATIENT
Start: 2019-04-22 | End: 2019-04-25 | Stop reason: HOSPADM

## 2019-04-22 RX ORDER — LINEZOLID 2 MG/ML
600 INJECTION, SOLUTION INTRAVENOUS EVERY 12 HOURS
Status: DISCONTINUED | OUTPATIENT
Start: 2019-04-22 | End: 2019-04-24

## 2019-04-22 RX ORDER — SODIUM CHLORIDE 9 MG/ML
INJECTION, SOLUTION INTRAVENOUS
Status: COMPLETED
Start: 2019-04-22 | End: 2019-04-22

## 2019-04-22 RX ORDER — SODIUM CHLORIDE 0.9 % (FLUSH) 0.9 %
10 SYRINGE (ML) INJECTION ONCE
Status: COMPLETED | OUTPATIENT
Start: 2019-04-22 | End: 2019-04-22

## 2019-04-22 RX ADMIN — GADOTERIDOL 15 ML: 279.3 INJECTION, SOLUTION INTRAVENOUS at 20:59

## 2019-04-22 RX ADMIN — Medication 10 ML: at 21:00

## 2019-04-22 RX ADMIN — INSULIN LISPRO 6 UNITS: 100 INJECTION, SOLUTION INTRAVENOUS; SUBCUTANEOUS at 13:49

## 2019-04-22 RX ADMIN — MORPHINE SULFATE 4 MG: 4 INJECTION INTRAVENOUS at 18:32

## 2019-04-22 RX ADMIN — INSULIN LISPRO 1 UNITS: 100 INJECTION, SOLUTION INTRAVENOUS; SUBCUTANEOUS at 21:58

## 2019-04-22 RX ADMIN — LINEZOLID 600 MG: 600 INJECTION, SOLUTION INTRAVENOUS at 14:37

## 2019-04-22 RX ADMIN — Medication 10 ML: at 09:19

## 2019-04-22 RX ADMIN — CEFAZOLIN 1 G: 1 INJECTION, POWDER, FOR SOLUTION INTRAMUSCULAR; INTRAVENOUS at 21:59

## 2019-04-22 RX ADMIN — MORPHINE SULFATE 4 MG: 4 INJECTION INTRAVENOUS at 03:37

## 2019-04-22 RX ADMIN — INSULIN LISPRO 6 UNITS: 100 INJECTION, SOLUTION INTRAVENOUS; SUBCUTANEOUS at 09:18

## 2019-04-22 RX ADMIN — ENOXAPARIN SODIUM 40 MG: 40 INJECTION SUBCUTANEOUS at 09:18

## 2019-04-22 RX ADMIN — MORPHINE SULFATE 4 MG: 4 INJECTION INTRAVENOUS at 09:18

## 2019-04-22 RX ADMIN — LISINOPRIL 10 MG: 10 TABLET ORAL at 09:18

## 2019-04-22 RX ADMIN — CEFAZOLIN 1 G: 1 INJECTION, POWDER, FOR SOLUTION INTRAMUSCULAR; INTRAVENOUS at 13:49

## 2019-04-22 RX ADMIN — MORPHINE SULFATE 4 MG: 4 INJECTION INTRAVENOUS at 21:59

## 2019-04-22 RX ADMIN — SODIUM CHLORIDE 100 ML: 9 INJECTION, SOLUTION INTRAVENOUS at 13:50

## 2019-04-22 RX ADMIN — INSULIN GLARGINE 45 UNITS: 100 INJECTION, SOLUTION SUBCUTANEOUS at 09:18

## 2019-04-22 RX ADMIN — Medication 10 ML: at 22:00

## 2019-04-22 RX ADMIN — INSULIN LISPRO 4 UNITS: 100 INJECTION, SOLUTION INTRAVENOUS; SUBCUTANEOUS at 17:48

## 2019-04-22 RX ADMIN — ONDANSETRON 4 MG: 2 INJECTION INTRAMUSCULAR; INTRAVENOUS at 21:59

## 2019-04-22 RX ADMIN — GENTAMICIN SULFATE: 1 CREAM TOPICAL at 18:32

## 2019-04-22 RX ADMIN — MORPHINE SULFATE 4 MG: 4 INJECTION INTRAVENOUS at 14:37

## 2019-04-22 ASSESSMENT — PAIN SCALES - GENERAL
PAINLEVEL_OUTOF10: 7

## 2019-04-22 NOTE — PROGRESS NOTES
Shift assessment complete. VSS. Scheduled medications given. Prn morphine administered per pt request. Will continue to monitor.

## 2019-04-22 NOTE — ED PROVIDER NOTES
Diagnostic, treatment, and disposition decisions were made by myself in conjunction with the advanced practice provider. I had intended to perform my history and exam. Before I could, she had been admitted and taken to the inpatient unit before I could evaluate her.      For further details of [facility] emergency department encounter, please see documentation by advanced practice provider  Domi Dos Santos, [address] Cruz MD  04/21/19 6872

## 2019-04-22 NOTE — CONSULTS
Inpatient consult to Podiatry  Consult performed by: Elana Sung DPM  Consult ordered by: Mauro Ashford MD        Podiatric surgery consult note        CHIEF COMPLAINT:  \"  Chief Complaint   Patient presents with    Toe Pain     left foot infection, odor started Friday. \"    Reason for Admission:  Diabetic foot infection ([facility] [K64.803, L08.9]  Diabetic foot infection ([facility] [K19.854, L08.9]    History Obtained From:  patient, electronic medical record    HISTORY OF PRESENT ILLNESS:      The patient is a 37 y.o. female uncontrolled diabetic with significant past medical history Listed below who presents with diabetic foot infection and pain. She states that she noticed redness after she had been working on her foot on Sunday at a site where she had a previous ulceration and callus. She states that it started turning red and has been getting worse. She has no other complaints at this time. She states she cannot afford her medications. She denies current nausea, vomiting, fever, chills, shortness of breath or chest pain. She rates her pain as moderate in the great left toe. Past Medical History:        Diagnosis Date    Diabetes mellitus out of control ([facility]     Diabetes mellitus, type II ([facility]     2005    Generalized headaches     Infertility     Insomnia     chronic vs lack of time spent to sleep    Migraine headache 11/9/2011    Mixed hyperlipidemia     Otitis media     h/o recurrent    Pelvic abscess in female 10/5/2013    Pneumonia     2004 approx.      Past Surgical History:        Procedure Laterality Date    CERVIX SURGERY      laser tx for dysplasia;1992                     Current Facility-Administered Medications:     linezolid (ZYVOX) IVPB 600 mg, 600 mg, Intravenous, Q12H, Hayde Felix MD, Stopped at 04/22/19 1615    ceFAZolin (ANCEF) 1 g in dextrose 5 % 50 mL IVPB (mini-bag), 1 g, Intravenous, Q8H, Hayde Felix MD, Stopped at 04/22/19 1456    gentamicin (GARAMYCIN) 0.1 % cream, , Topical, Daily, Nancyann Spray, DPM    gabapentin (NEURONTIN) capsule 100 mg, 100 mg, Oral, TID, Mj Perrin MD    insulin glargine (LANTUS) injection pen 45 Units, 45 Units, Subcutaneous, Daily, Mj Perrin MD, 45 Units at 04/22/19 0918    lisinopril (PRINIVIL;ZESTRIL) tablet 10 mg, 10 mg, Oral, Daily, Mj Perrin MD, 10 mg at 04/22/19 9624    sodium chloride flush 0.9 % injection 10 mL, 10 mL, Intravenous, 2 times per day, Mj Perrin MD, 10 mL at 04/22/19 0919    sodium chloride flush 0.9 % injection 10 mL, 10 mL, Intravenous, PRN, Mj Perrin MD    magnesium hydroxide (MILK OF MAGNESIA) 400 MG/5ML suspension 30 mL, 30 mL, Oral, Daily PRN, Mj Perrin MD    ondansetron [facility]) injection 4 mg, 4 mg, Intravenous, Q6H PRN, Mj Perrin MD    enoxaparin (LOVENOX) injection 40 mg, 40 mg, Subcutaneous, Daily, Mj Perrin MD, 40 mg at 04/22/19 0918    glucose (GLUTOSE) 40 % oral gel 15 g, 15 g, Oral, PRN, Mj Perrin MD    dextrose 50 % solution 12.5 g, 12.5 g, Intravenous, PRN, Mj Perrin MD    glucagon (rDNA) injection 1 mg, 1 mg, Intramuscular, PRN, Mj Perrin MD    dextrose 5 % solution, 100 mL/hr, Intravenous, PRN, Mj Perrin MD    acetaminophen (TYLENOL) tablet 650 mg, 650 mg, Oral, Q4H PRN, Mj Perrin MD    insulin lispro (HUMALOG) injection pen 0-12 Units, 0-12 Units, Subcutaneous, TID Makayla ESTEBAN MD, 4 Units at 04/22/19 5668    insulin lispro (HUMALOG) injection pen 0-6 Units, 0-6 Units, Subcutaneous, Nightly, Mj Perrin MD, 4 Units at 04/21/19 7189    morphine injection 4 mg, 4 mg, Intravenous, Q4H PRN, Mj Perrin MD, 4 mg at 04/22/19 1050    Allergies:  Amoxicillin; Levofloxacin; and Vancomycin    Social History     Socioeconomic History    Marital status:      Spouse name: Randa Rose Number of children: 0    Years of education: Not on file    Highest education level: Not on file   Occupational History    Occupation: works as    Social Needs    Financial resource strain: Not on file    Food insecurity:     Worry: Not on file     Inability: Not on file    Transportation needs:     Medical: Not on file     Non-medical: Not on file   Tobacco Use    Smoking status: Current Every Day Smoker     Packs/day: 1.00     Types: Cigarettes    Smokeless tobacco: Never Used   Substance and Sexual Activity    Alcohol use: No     Comment: Very Rare    Drug use: No    Sexual activity: Yes     Partners: Male   Lifestyle    Physical activity:     Days per week: Not on file     Minutes per session: Not on file    Stress: Not on file   Relationships    Social connections:     Talks on phone: Not on file     Gets together: Not on file     Attends Taoist service: Not on file     Active member of club or organization: Not on file     Attends meetings of clubs or organizations: Not on file     Relationship status: Not on file    Intimate partner violence:     Fear of current or ex partner: Not on file     Emotionally abused: Not on file     Physically abused: Not on file     Forced sexual activity: Not on file   Other Topics Concern    Not on file   Social History Narrative    Not on file     Family History   Problem Relation Age of Onset    Diabetes Mother     Diabetes Father     High Blood Pressure Father     Cancer Father         colon    Diabetes Sister     Diabetes Paternal Grandmother      REVIEW OF SYSTEMS:  Negative other than mentioned in the HPI    CBC with Differential:    Lab Results   Component Value Date    WBC 15.6 04/21/2019    RBC 4.93 04/21/2019    HGB 13.1 04/21/2019    HCT 39.8 04/21/2019     04/21/2019    MCV 80.9 04/21/2019    MCH 26.6 04/21/2019    MCHC 32.8 04/21/2019    RDW 15.5 04/21/2019    SEGSPCT 71.2 04/18/2011    BLASTSPCT 0 10/03/2013    LYMPHOPCT 17.9 04/21/2019    MONOPCT 6.4 04/21/2019    EOSPCT 0.4 04/18/2011    BASOPCT 0.5 04/21/2019    MONOSABS 1.0 04/21/2019 through and including the removal of epidermis, dermis, subcutaneous tissue and muscle/fascia. Devitalized Tissue Debrided:  fibrin, biofilm, slough, necrotic/eschar, exudate and callus    Pre Debridement Measurements:  Are located in the Wound Documentation Flow Sheet      Wound Care Documentation:  Incision 10/05/13 Other (Comment) Left (Active)   Number of days: 2025       Wound 04/21/19 Toe (Comment  which one) Anterior;Left;Medial Dry, 2 cm by 1 cm brown area surrounded by white tisue, slight redness to great toe generally (Active)   Wound Diabetic 4/22/2019  9:17 AM   Dressing Status Clean;Dry; Intact; Changed 4/22/2019  6:38 PM   Dressing Changed Changed/New 4/22/2019  6:38 PM   Dressing/Treatment Pharmaceutical agent (see MAR);4x4;Dry Dressing 4/22/2019  6:38 PM   Wound Assessment Pink;Painful;Brown 4/22/2019  6:38 PM   Drainage Amount None 4/22/2019  6:38 PM   Odor None 4/22/2019  6:38 PM   Number of days: 0   2cm x2cmx0.4cm  Malodor and purulence noted    Total Surface Area Debrided:  4 sq cm     Percentage of wound debrided 100%    Bleeding:  Minimal    Hemostasis Achieved:  by pressure    Procedural Pain:  4  / 10     Post Procedural Pain:  1 / 10     Response to treatment:  With complaints of pain. ASSESSMENT AND PLAN:  -Uncontrolled diabetic with diabetic foot infection  Patient examined and evaluated  Patient's wound debrided without incident  Discussed unacceptable blood sugars for healing including hemoglobin A1c over 13. DISPO: Await MRI, patient would like to see Dr. Navarro Joyce for her foot care. I will discuss this with Dr. Navarro Joyce. Thanks for the opportunity to participate in this patient's care.      Alfa Fuentes DPM   Cell # [phone]

## 2019-04-22 NOTE — ED NOTES
Report called to [facility], RN verbalized understanding and denied any need for further information, patient visible on tele monitor on unit, patient to be transported to unit at this time      Bharti Briggs RN  04/21/19 1359

## 2019-04-22 NOTE — PROGRESS NOTES
Hospitalist Progress Note      PCP: No primary care provider on file. Date of Admission: 4/21/2019    LOS: 1    Chief Complaint:   Chief Complaint   Patient presents with    Toe Pain     left foot infection, odor started Friday. Case Summary:   45-year-old lady with history of diabetes poorly controlled, hyperlipidemia, history of migraines, who presented with left great toe pain with foul-smelling drainage for 2 days concerning for cellulitis with abscess. Active Hospital Problems    Diagnosis Date Noted    Diabetic foot infection ([facility] [G84.971, L08.9] 04/21/2019    Leucocytosis [D72.829] 10/05/2013    Diabetes mellitus type 2, uncontrolled ([facility] [E11.65]        Assessment/Plan:  Principal Problem:    Diabetic foot infection ([facility]  Active Problems:    Diabetes mellitus type 2, uncontrolled (HCC)    Leucocytosis    -Continue antibiotic regimen. We will put him on cefazolin with Zyvox. Patient reports intolerance to vancomycin  -Await podiatry consult  -Wound care consult with dressings  -Provide adequate glycemic control  -pain management with when necessary IV narcotics   -Follow-up MRI scan to rule out osteomyelitis      Medications:  Reviewed  Infusion Medications    dextrose       Scheduled Medications    linezolid  600 mg Intravenous Q12H    ceFAZolin  1 g Intravenous Q8H    gabapentin  100 mg Oral TID    insulin glargine  45 Units Subcutaneous Daily    lisinopril  10 mg Oral Daily    sodium chloride flush  10 mL Intravenous 2 times per day    enoxaparin  40 mg Subcutaneous Daily    insulin lispro  0-12 Units Subcutaneous TID WC    insulin lispro  0-6 Units Subcutaneous Nightly     PRN Meds: sodium chloride flush, magnesium hydroxide, ondansetron, glucose, dextrose, glucagon (rDNA), dextrose, acetaminophen, morphine        DVT Prophylaxis: Subcut in enoxaparin  Diet: DIET CARB CONTROL;  Code Status: Full Code    Dispo:  Anticipate discharge in the next 72 hrs    ____________________________________________________________________________    Subjective:   Overnight Events:   Painful great toe on the left, no fevers, no chills  Leukocytosis improving       Physical Exam Performed:  BP (!) 148/80   Pulse 91   Temp 98.2 °F (36.8 °C) (Oral)   Resp 18   Ht 5' 7\" (1.702 m)   Wt 168 lb (76.2 kg)   LMP 03/31/2019   SpO2 96%   BMI 26.31 kg/m²   General appearance: No apparent distress, appears stated age and cooperative. HEENT: Normocephalic, atraumatic, MMM, No sclera icterus/conjuctival palor  Neck: Supple, no thyromegally. No jugular venous distention. Respiratory:  Normal respiratory effort. Clear to auscultation, no Rales/Wheezes/Rhonchi. Cardiovascular: S1/S2 without murmurs, rubs or gallops. RRR  Abdomen: Soft, non-tender, non-distended, bowel sounds present. Musculoskeletal: No clubbing, cyanosis or edema bilaterally. Dressed left hallux  Skin: Skin color, texture, turgor normal.  No rashes or lesions. Neurologic:  Cranial nerves: II-XII intact, KWAME, No focal sensory/motor deficits     No intake or output data in the 24 hours ending 04/22/19 1514    Labs:   Recent Labs     04/21/19  1904   WBC 15.6*   HGB 13.1   HCT 39.8         Recent Labs     04/21/19  1905   *   K 4.2   CL 94*   CO2 28   BUN 14   CREATININE 0.9   CALCIUM 9.3   AST 16   ALT 14   BILITOT <0.2   ALKPHOS 170*     No results for input(s): Aziza Palumbo in the last 72 hours. Urinalysis:  No results found for: [place], BACTERIA, RBCUA, BLOODU, SPECGRAV, [address]    Radiology:  XR FOOT LEFT (MIN 3 VIEWS)   Final Result   Soft tissue defect and soft tissue edema of the 1st digit. No evidence for   osteomyelitis.          MRI FOOT LEFT W WO CONTRAST    (Results Pending)           Ted Hernandez MD

## 2019-04-22 NOTE — ED NOTES
Patient c/o shortness of breath and sinus congesting and feeling hot, I stopped the vanc at this time and informed ERIKA Das and benadryl ordered at this time      Lisy Hines RN  04/21/19 5285

## 2019-04-22 NOTE — CARE COORDINATION
Discharge Planning Assessment  RN/SW discharge planner met with patient/(and family member) to discuss reason for admission, current living situation, and potential needs at the time of discharge    Demographics/Insurance verified Yes    Current type of dwelling: home    Living arrangements: w/significant other    Level of function/Support: w/spouse    PCP:pt stated no PCP    Last Visit to PCP:saw Dr. Romie Nageotte in about 2014    DME:stated none    Active with any community resources/agencies/skilled home care:stated none    Medication compliance issues: no PCP-provided pt w/[employer] brochure    Financial issues that could impact healthcare:stated she has used [address] for insuliin, but has trouble obtaining needles. Stated she uses her friend's supplies. Transportation at the time of discharge:significant other    Tentative discharge plan: home w/meds to beds for insulin and needles, otherwise no needs.     Electronically signed by LEYDI Austin on 4/22/2019 at 3:11 PM

## 2019-04-22 NOTE — PLAN OF CARE
Problem: Pain:  Goal: Pain level will decrease  Description  Pain level will decrease  Outcome: Ongoing  Goal: Control of acute pain  Description  Control of acute pain  Outcome: Ongoing  Goal: Control of chronic pain  Description  Control of chronic pain  Outcome: Ongoing     Problem: Falls - Risk of:  Goal: Will remain free from falls  Description  Will remain free from falls  Outcome: Ongoing  Goal: Absence of physical injury  Description  Absence of physical injury  Outcome: Ongoing     Problem:  Activity:  Goal: Risk for activity intolerance will decrease  Description  Risk for activity intolerance will decrease  Outcome: Ongoing     Problem: Health Behavior:  Goal: Ability to identify and utilize available resources and services will improve  Description  Ability to identify and utilize available resources and services will improve  Outcome: Ongoing     Problem: Metabolic:  Goal: Ability to maintain appropriate glucose levels will improve  Description  Ability to maintain appropriate glucose levels will improve  Outcome: Ongoing     Problem: Nutritional:  Goal: Maintenance of adequate nutrition will improve  Description  Maintenance of adequate nutrition will improve  Outcome: Ongoing     Problem: Physical Regulation:  Goal: Complications related to the disease process, condition or treatment will be avoided or minimized  Description  Complications related to the disease process, condition or treatment will be avoided or minimized  Outcome: Ongoing     Problem: Skin Integrity:  Goal: Risk for impaired skin integrity will decrease  Description  Risk for impaired skin integrity will decrease  Outcome: Ongoing     Problem: Tissue Perfusion:  Goal: Adequacy of tissue perfusion will improve  Description  Adequacy of tissue perfusion will improve  Outcome: Ongoing

## 2019-04-22 NOTE — H&P
HOSPITALISTS HISTORY AND PHYSICAL    4/21/2019 10:17 PM    Patient Information:  Joel England is a 37 y.o. female [id number]  PCP:  No primary care provider on file. (Tel: None )    Chief complaint:    Chief Complaint   Patient presents with    Toe Pain     left foot infection, odor started Friday. History of Present Illness:  Adrian Guevara is a 37 y.o. female with type 2 DM , presents with left great toe pain drainage and infection  . She noticed drainage 2 days ago . She attempted to take care of it at home, removed the dead skin and dressed it. No use of abx. She noticed worsening foul smelling discharge. She also noticed redness surrounding the area. She has worsening pain with ambulation. n ofever, chills, nausea or vomiting  Blood sugar is elevated in the ER      REVIEW OF SYSTEMS:   Constitutional: Negative for fever,chills or night sweats  ENT: Negative for rhinorrhea, epistaxis, hoarseness, sore throat. Respiratory: Negative for shortness of breath,wheezing  Cardiovascular: Negative for chest pain, palpitations   Gastrointestinal: Negative for nausea, vomiting, diarrhea  Genitourinary: Negative for polyuria, dysuria   Hematologic/Lymphatic: Negative for bleeding tendency, easy bruising  Musculoskeletal: Negative for myalgias and arthralgias  Neurologic: Negative for confusion,dysarthria. Skin: Negative for itching,rash  Psychiatric: Negative for depression,anxiety, agitation. Endocrine: Negative for polydipsia,polyuria,heat /cold intolerance. Past Medical History:   has a past medical history of Diabetes mellitus out of control ([facility] Diabetes mellitus, type II ([facility] Generalized headaches, Infertility, Insomnia, Migraine headache, Mixed hyperlipidemia, Otitis media, Pelvic abscess in female, and Pneumonia. Past Surgical History:   has a past surgical history that includes Cervix surgery.      Medications:  No current facility-administered medications on file prior to encounter. Current Outpatient Medications on File Prior to Encounter   Medication Sig Dispense Refill    gabapentin (NEURONTIN) 100 MG capsule Take 100 mg by mouth 3 times daily. Tracey Adhikari Naproxen Sodium (ALEVE) 220 MG CAPS Take 220 mg by mouth as needed. Takes prn:  Possibly 2-4 once or twice per week.  lisinopril (PRINIVIL) 10 MG tablet Take 1 tablet by mouth daily. 30 tablet 3    insulin glargine (LANTUS) 100 UNIT/ML injection Inject 45 Units into the skin daily. 5 Pen 3    glucose blood VI test strips (VICTORY AGM-4000 TEST) strip Test four times daily until controlled and then three times daily. Code 250.02  ONE TOUCH ULTRA MONITOR 100 strip 12    insulin lispro (HUMALOG) 100 UNIT/ML injection Inject  into the skin 3 times daily (before meals). SLIDING SCALE SC BEFORE MEALS      Insulin Pen Needle (B-D UF III MINI PEN NEEDLES) 31G X 5 MM MISC by Does not apply route.  100 each 6     Current Facility-Administered Medications   Medication Dose Route Frequency Provider Last Rate Last Dose    gabapentin (NEURONTIN) capsule 100 mg  100 mg Oral TID MD Javier Garay [START ON 4/22/2019] insulin glargine (LANTUS) injection pen 45 Units  45 Units Subcutaneous Daily MD Javier Garay [facility] ON 4/22/2019] lisinopril (PRINIVIL;ZESTRIL) tablet 10 mg  10 mg Oral Daily Renetta Burks MD        sodium chloride flush 0.9 % injection 10 mL  10 mL Intravenous 2 times per day Renetta Burks MD        sodium chloride flush 0.9 % injection 10 mL  10 mL Intravenous PRN Renetta Burks MD        magnesium hydroxide (MILK OF MAGNESIA) 400 MG/5ML suspension 30 mL  30 mL Oral Daily PRN Renetta Burks MD        ondansetron [facility]) injection 4 mg  4 mg Intravenous Q6H PRN MD Javier Garay [START ON 4/22/2019] enoxaparin (LOVENOX) injection 40 mg  40 mg Subcutaneous Daily Makayla Stafford MD        glucose (GLUTOSE) 40 % oral gel 15 g  15 g Oral PRN Makayla Jemima Schwab MD        dextrose 50 % solution 12.5 g  12.5 g Intravenous PRN Nuvia Iverson MD        glucagon (rDNA) injection 1 mg  1 mg Intramuscular PRN Nuvia Iverson MD        dextrose 5 % solution  100 mL/hr Intravenous PRN Nuvia Iverson MD        acetaminophen (TYLENOL) tablet 650 mg  650 mg Oral Q4H PRN Nuvia Iverson MD       [facility] [START ON 4/22/2019] insulin lispro (HUMALOG) injection pen 0-12 Units  0-12 Units Subcutaneous TID WC Nuvia Iverson MD        insulin lispro (HUMALOG) injection pen 0-6 Units  0-6 Units Subcutaneous Nightly Nuvia Iverson MD        morphine injection 4 mg  4 mg Intravenous Q4H PRN Nuvia Iverson MD       [facility] [START ON 4/22/2019] vancomycin (VANCOCIN) 1000 mg in dextrose 5% 200 mL IVPB  1,000 mg Intravenous Q12H Nuvia Iverson MD           Allergies: Allergies   Allergen Reactions    Amoxicillin     Levofloxacin         Social History:   reports that she has been smoking cigarettes. She has been smoking about 1.00 pack per day. She has never used smokeless tobacco. She reports that she does not drink alcohol or use drugs. Family History:  family history includes Cancer in her father; Diabetes in her father, mother, paternal grandmother, and sister; High Blood Pressure in her father. ,     Physical Exam:  BP (!) 182/99   Pulse 100   Temp 98.6 °F (37 °C) (Oral)   Resp 18   Ht 5' 7\" (1.702 m)   Wt 168 lb (76.2 kg)   LMP 03/31/2019   SpO2 100%   BMI 26.31 kg/m²     General appearance:  Appears comfortable. Well nourished  Eyes: Sclera clear, pupils equal  ENT: Moist mucus membranes, no thrush. Trachea midline. Cardiovascular: Regular rhythm, normal S1, S2. No murmur, gallop, rub. No edema in lower extremities  Respiratory: Clear to auscultation bilaterally, no wheeze, good inspiratory effort  Gastrointestinal: Abdomen soft, non-tender, not distended, normal bowel sounds  Musculoskeletal: No cyanosis in digits, neck supple  Neurology: Cranial nerves grossly intact.  Alert and oriented in time, place and person. No speech or motor deficits  Psychiatry: Appropriate affect. Not agitated  Skin: Warm, dry, normal turgor, no rash    Labs:  CBC:   Lab Results   Component Value Date    WBC 15.6 04/21/2019    RBC 4.93 04/21/2019    HGB 13.1 04/21/2019    HCT 39.8 04/21/2019    MCV 80.9 04/21/2019    MCH 26.6 04/21/2019    MCHC 32.8 04/21/2019    RDW 15.5 04/21/2019     04/21/2019    MPV 8.0 04/21/2019     BMP:    Lab Results   Component Value Date     04/21/2019    K 4.2 04/21/2019    CL 94 04/21/2019    CO2 28 04/21/2019    BUN 14 04/21/2019    CREATININE 0.9 04/21/2019    CALCIUM 9.3 04/21/2019    GFRAA >60 04/21/2019    GFRAA >60 11/09/2011    LABGLOM >60 04/21/2019    GLUCOSE 504 04/21/2019       Chest Xray:   EKG:        Problem List  Active Problems:    Diabetic foot infection ([facility]  Resolved Problems:    * No resolved hospital problems. *        Assessment/Plan:         1. Left foot diabetic foot infection   IV abx  Xray is neg for osteomyelitis   MRI foot ordered  Podiatry consulted    Type 2 DM uncontrolled  A1c is pending  Cont lantus, sliding scale insulin  Carb control diet       Admit as inpatient. I anticipate hospitalization spanning more than two midnights for investigation and treatment of the above medically necessary diagnoses.       Tyshawn Blackwell MD    4/21/2019 10:17 PM

## 2019-04-22 NOTE — PROGRESS NOTES
Clinical Pharmacy Note:    Pharmacy consulted to dose Vancomycin for diabetic foot infection. Age: 37  Height: 5'7\"  Weight: 76.2 kg  Scr= 0.9 mg/dL      Started Vancomycin 1000 IV q12. Trough ordered before 4th dose (4/23/2019 @0830) with an order to hold if trough greater than 20mcg/ml. Thanks for the consult!   Anni Cunningham, [place], [facility]

## 2019-04-23 LAB
AMPHETAMINE SCREEN, URINE: ABNORMAL
BARBITURATE SCREEN URINE: ABNORMAL
BENZODIAZEPINE SCREEN, URINE: ABNORMAL
C-REACTIVE PROTEIN: 5.2 MG/L (ref 0–5.1)
CANNABINOID SCREEN URINE: ABNORMAL
COCAINE METABOLITE SCREEN URINE: ABNORMAL
GLUCOSE BLD-MCNC: 117 MG/DL (ref 70–99)
GLUCOSE BLD-MCNC: 150 MG/DL (ref 70–99)
GLUCOSE BLD-MCNC: 153 MG/DL (ref 70–99)
GLUCOSE BLD-MCNC: 75 MG/DL (ref 70–99)
Lab: ABNORMAL
METHADONE SCREEN, URINE: ABNORMAL
OPIATE SCREEN URINE: POSITIVE
OXYCODONE URINE: ABNORMAL
PERFORMED ON: ABNORMAL
PERFORMED ON: NORMAL
PH UA: 5
PHENCYCLIDINE SCREEN URINE: ABNORMAL
PROPOXYPHENE SCREEN: ABNORMAL
SEDIMENTATION RATE, ERYTHROCYTE: 43 MM/HR (ref 0–20)

## 2019-04-23 PROCEDURE — 6360000002 HC RX W HCPCS: Performed by: INTERNAL MEDICINE

## 2019-04-23 PROCEDURE — 2580000003 HC RX 258: Performed by: INTERNAL MEDICINE

## 2019-04-23 PROCEDURE — 6360000002 HC RX W HCPCS: Performed by: FAMILY MEDICINE

## 2019-04-23 PROCEDURE — 99255 IP/OBS CONSLTJ NEW/EST HI 80: CPT | Performed by: INTERNAL MEDICINE

## 2019-04-23 PROCEDURE — 2580000003 HC RX 258: Performed by: FAMILY MEDICINE

## 2019-04-23 PROCEDURE — 87641 MR-STAPH DNA AMP PROBE: CPT

## 2019-04-23 PROCEDURE — 6370000000 HC RX 637 (ALT 250 FOR IP): Performed by: FAMILY MEDICINE

## 2019-04-23 PROCEDURE — 86140 C-REACTIVE PROTEIN: CPT

## 2019-04-23 PROCEDURE — 36415 COLL VENOUS BLD VENIPUNCTURE: CPT

## 2019-04-23 PROCEDURE — 85652 RBC SED RATE AUTOMATED: CPT

## 2019-04-23 PROCEDURE — 6370000000 HC RX 637 (ALT 250 FOR IP): Performed by: INTERNAL MEDICINE

## 2019-04-23 PROCEDURE — 1200000000 HC SEMI PRIVATE

## 2019-04-23 PROCEDURE — 2580000003 HC RX 258

## 2019-04-23 PROCEDURE — 80307 DRUG TEST PRSMV CHEM ANLYZR: CPT

## 2019-04-23 RX ORDER — SODIUM CHLORIDE 9 MG/ML
INJECTION, SOLUTION INTRAVENOUS
Status: COMPLETED
Start: 2019-04-23 | End: 2019-04-23

## 2019-04-23 RX ADMIN — SODIUM CHLORIDE 250 ML: 9 INJECTION, SOLUTION INTRAVENOUS at 21:43

## 2019-04-23 RX ADMIN — LINEZOLID 600 MG: 600 INJECTION, SOLUTION INTRAVENOUS at 14:15

## 2019-04-23 RX ADMIN — MORPHINE SULFATE 4 MG: 4 INJECTION INTRAVENOUS at 06:06

## 2019-04-23 RX ADMIN — MORPHINE SULFATE 4 MG: 4 INJECTION INTRAVENOUS at 14:54

## 2019-04-23 RX ADMIN — CEFAZOLIN 1 G: 1 INJECTION, POWDER, FOR SOLUTION INTRAMUSCULAR; INTRAVENOUS at 21:43

## 2019-04-23 RX ADMIN — CEFEPIME HYDROCHLORIDE 2 G: 2 INJECTION, POWDER, FOR SOLUTION INTRAVENOUS at 14:15

## 2019-04-23 RX ADMIN — LISINOPRIL 10 MG: 10 TABLET ORAL at 10:11

## 2019-04-23 RX ADMIN — GENTAMICIN SULFATE: 1 CREAM TOPICAL at 10:11

## 2019-04-23 RX ADMIN — INSULIN LISPRO 1 UNITS: 100 INJECTION, SOLUTION INTRAVENOUS; SUBCUTANEOUS at 22:52

## 2019-04-23 RX ADMIN — INSULIN GLARGINE 45 UNITS: 100 INJECTION, SOLUTION SUBCUTANEOUS at 10:11

## 2019-04-23 RX ADMIN — Medication 10 ML: at 10:11

## 2019-04-23 RX ADMIN — INSULIN LISPRO 2 UNITS: 100 INJECTION, SOLUTION INTRAVENOUS; SUBCUTANEOUS at 10:10

## 2019-04-23 RX ADMIN — CEFAZOLIN 1 G: 1 INJECTION, POWDER, FOR SOLUTION INTRAMUSCULAR; INTRAVENOUS at 06:06

## 2019-04-23 RX ADMIN — INSULIN LISPRO 5 UNITS: 100 INJECTION, SOLUTION INTRAVENOUS; SUBCUTANEOUS at 18:22

## 2019-04-23 RX ADMIN — MORPHINE SULFATE 4 MG: 4 INJECTION INTRAVENOUS at 22:45

## 2019-04-23 RX ADMIN — MORPHINE SULFATE 4 MG: 4 INJECTION INTRAVENOUS at 10:11

## 2019-04-23 RX ADMIN — ONDANSETRON 4 MG: 2 INJECTION INTRAMUSCULAR; INTRAVENOUS at 22:45

## 2019-04-23 RX ADMIN — CEFAZOLIN 1 G: 1 INJECTION, POWDER, FOR SOLUTION INTRAMUSCULAR; INTRAVENOUS at 14:15

## 2019-04-23 RX ADMIN — MORPHINE SULFATE 4 MG: 4 INJECTION INTRAVENOUS at 18:49

## 2019-04-23 RX ADMIN — MORPHINE SULFATE 4 MG: 4 INJECTION INTRAVENOUS at 01:53

## 2019-04-23 RX ADMIN — Medication 10 ML: at 21:49

## 2019-04-23 RX ADMIN — LINEZOLID 600 MG: 600 INJECTION, SOLUTION INTRAVENOUS at 01:53

## 2019-04-23 RX ADMIN — ENOXAPARIN SODIUM 40 MG: 40 INJECTION SUBCUTANEOUS at 10:11

## 2019-04-23 RX ADMIN — INSULIN LISPRO 5 UNITS: 100 INJECTION, SOLUTION INTRAVENOUS; SUBCUTANEOUS at 10:14

## 2019-04-23 RX ADMIN — INSULIN LISPRO 5 UNITS: 100 INJECTION, SOLUTION INTRAVENOUS; SUBCUTANEOUS at 14:16

## 2019-04-23 ASSESSMENT — ENCOUNTER SYMPTOMS
COUGH: 0
CHEST TIGHTNESS: 0
EYE DISCHARGE: 0
CHOKING: 0
TROUBLE SWALLOWING: 0
APNEA: 0
FACIAL SWELLING: 0
EYE REDNESS: 0
SHORTNESS OF BREATH: 0
NAUSEA: 0
COLOR CHANGE: 0
PHOTOPHOBIA: 0
ABDOMINAL PAIN: 0
BLOOD IN STOOL: 0
DIARRHEA: 0
RHINORRHEA: 0
STRIDOR: 0

## 2019-04-23 ASSESSMENT — PAIN SCALES - GENERAL
PAINLEVEL_OUTOF10: 7

## 2019-04-23 NOTE — PROGRESS NOTES
Podiatric Surgery      Subjective:     Patient seen at bedside this evening. She states that the toe is looking better. She admits to minor pain. She is amendable to IV antibiotics on discharge.       Objective:   Scheduled Meds:   insulin lispro  5 Units Subcutaneous TID WC    cefepime  2 g Intravenous Q12H    linezolid  600 mg Intravenous Q12H    ceFAZolin  1 g Intravenous Q8H    gentamicin   Topical Daily    gabapentin  100 mg Oral TID    insulin glargine  45 Units Subcutaneous Daily    lisinopril  10 mg Oral Daily    sodium chloride flush  10 mL Intravenous 2 times per day    enoxaparin  40 mg Subcutaneous Daily    insulin lispro  0-12 Units Subcutaneous TID WC    insulin lispro  0-6 Units Subcutaneous Nightly     Continuous Infusions:   dextrose       PRN Meds:.sodium chloride flush, magnesium hydroxide, ondansetron, glucose, dextrose, glucagon (rDNA), dextrose, acetaminophen, morphine    CBC with Differential:    Lab Results   Component Value Date    WBC 15.6 04/21/2019    RBC 4.93 04/21/2019    HGB 13.1 04/21/2019    HCT 39.8 04/21/2019     04/21/2019    MCV 80.9 04/21/2019    MCH 26.6 04/21/2019    MCHC 32.8 04/21/2019    RDW 15.5 04/21/2019    SEGSPCT 71.2 04/18/2011    BLASTSPCT 0 10/03/2013    LYMPHOPCT 17.9 04/21/2019    MONOPCT 6.4 04/21/2019    EOSPCT 0.4 04/18/2011    BASOPCT 0.5 04/21/2019    MONOSABS 1.0 04/21/2019    LYMPHSABS 2.8 04/21/2019    EOSABS 0.3 04/21/2019    BASOSABS 0.1 04/21/2019    DIFFTYPE Auto-K 04/18/2011     BMP:    Lab Results   Component Value Date     04/21/2019    K 4.2 04/21/2019    CL 94 04/21/2019    CO2 28 04/21/2019    BUN 14 04/21/2019    LABALBU 3.8 04/21/2019    CREATININE 0.9 04/21/2019    CALCIUM 9.3 04/21/2019    GFRAA >60 04/21/2019    GFRAA >60 11/09/2011    LABGLOM >60 04/21/2019    GLUCOSE 504 04/21/2019       VITALS:  /83   Pulse 94   Temp 98.1 °F (36.7 °C) (Oral)   Resp 18   Ht 5' 7\" (1.702 m)   Wt 168 lb (76.2 kg)   LMP 03/31/2019   SpO2 94%   BMI 26.31 kg/m²   24HR INTAKE/OUTPUT:    Intake/Output Summary (Last 24 hours) at 4/23/2019 1953  Last data filed at 4/23/2019 1231  Gross per 24 hour   Intake 360 ml   Output --   Net 360 ml   MRI reviewed and positive for early osteomyelitis. Wound improving. Minimal active drainage, erythema resolving, edema decreased. Assessment:     Principal Problem:    Diabetic foot infection ([address]  Active Problems:    Type 2 diabetes mellitus with left diabetic foot infection ([address]    Leucocytosis    Pyogenic inflammation of bone (HCC)    Essential hypertension    History of migraine    History of medication noncompliance    Overweight (BMI 25.0-29. 9)  Resolved Problems:    * No resolved hospital problems. *    Diabetic foot infection, uncontrolled diabetes  Plan:   -  Dressing : Dry sterile dressing with gentamicin  -  WBAT  -  Continue antibiotics per infectious disease, recommend 6 weeks on d/c  -  Patient will like to follow up with Dr. Lynnette Membreno in the wound care center.  - Discussed risk of great toe amputation and continued infection along with uncontrolled diabetes. - Okay for DC with empiric antibiotics and follow-up in the wound care center with Dr. Lynnette Membreno.       Claude Courser, DPM  Cell:[phone]

## 2019-04-23 NOTE — PROGRESS NOTES
Nutrition Assessment (Low Risk)    Type and Reason for Visit: Initial, Positive Nutrition Screen       Nutrition Assessment:  Patient assessed for nutritional risk. Deemed to be at low risk at this time. Will continue to monitor for changes in status. Positive screen for pressure wounds. No pressure wounds noted, pt with DM foot ulcer. Pt has T2DM, Hgb A1c is 13.4. Pt seen in room this date, she reports she is compliant with carb control diet, consumes many fruits vegetables, whole grains, legumes and protein with meals. She states her A1c is high d/t not taking insulin at home for many years. Provided carbohydrate control handout, brief discussion on carb counting. Pt voiced understanding. No questions at this time. Pt denies any nutrition needs.     Malnutrition Assessment:  · Malnutrition Status: No malnutrition    Nutrition Risk Level   Risk Level: Low    Nutrition Diagnosis:   · Problem: Food and nutrition-related knowledge deficit  · Etiology: Lack of prior nutrition-related education    Signs and symptoms: Other (Comment)(Hgb A1c 13.4)    Nutrition Intervention:  Food and/or Delivery: Continue current diet  Nutrition Education/Counseling/Coordination of Care:  Continued Inpatient Monitoring, Education Initiated      Electronically signed by Tena Hull RD, CONNIE on 4/23/19 at 11:00 AM    Contact Number: [phone]

## 2019-04-23 NOTE — PLAN OF CARE
Pain will decrease throughout shift. Pt instructed to request medications as pain arises, before pain is out of control. Verbalizes understanding. Will continue to monitor.

## 2019-04-23 NOTE — PROGRESS NOTES
Hospitalist Progress Note      PCP: No primary care provider on file. Date of Admission: 4/21/2019    LOS: 2    Chief Complaint:   Chief Complaint   Patient presents with    Toe Pain     left foot infection, odor started Friday. Case Summary:   51-year-old lady with history of diabetes poorly controlled, hyperlipidemia, history of migraines, who presented with left great toe pain with foul-smelling drainage for 2 days concerning for cellulitis with abscess. Active Hospital Problems    Diagnosis Date Noted    Pyogenic inflammation of bone (HCC) [M86.9]     Essential hypertension [I10]     History of migraine [Z86.69]     History of medication noncompliance [Z91.14]     Overweight (BMI 25.0-29. 9) [E66.3]     Diabetic foot infection ([facility] [N63.489, L08.9] 04/21/2019    Leucocytosis [D72.829] 10/05/2013    Type 2 diabetes mellitus with left diabetic foot infection ([address] [I73.976, L08.9]        Assessment/Plan:  Principal Problem:    Diabetic foot infection ([address] with Leucocytosis: Patient was seen by podiatry status post debridement of the wound.   MRI of the foot was noted for probable distal phalanx osteomyelitis  -Continue antibiotic regimen  -ID consult  -Podiatry following  -Continue dressings    Active Problems:    Diabetes mellitus type 2, uncontrolled ([address]): Poorly controlled with A1c greater than 10.  -We'll place her on lispro 5 units 3 times a day with meals and a correctional sliding scale and continue basal bolus glargine 15 units at night  -I have underscored the need for good glucose control      Medications:  Reviewed  Infusion Medications    dextrose       Scheduled Medications    insulin lispro  5 Units Subcutaneous TID     cefepime  2 g Intravenous Q12H    linezolid  600 mg Intravenous Q12H    ceFAZolin  1 g Intravenous Q8H    gentamicin   Topical Daily    gabapentin  100 mg Oral TID    insulin glargine  45 Units Subcutaneous Daily    lisinopril  10 mg Oral Daily hours.    Urinalysis:  No results found for: [place], BACTERIA, RBCUA, BLOODU, [address], [address]    Radiology:  MRI FOOT LEFT W WO CONTRAST   Final Result   Soft tissue defect overlying the 1st phalanx. Although there is no cortical   disruption or T1 marrow signal abnormality, there is marrow edema within the   distal 1st phalanx and within the distal aspect of the 1st metatarsal.  These   findings are suggestive of subtle/early osteomyelitis. No evidence for   abscess formation. XR FOOT LEFT (MIN 3 VIEWS)   Final Result   Soft tissue defect and soft tissue edema of the 1st digit. No evidence for   osteomyelitis.                  Inge Moscoso MD

## 2019-04-23 NOTE — CONSULTS
Infectious Diseases   Consult Note        Admission Date: 4/21/2019  Hospital Day: Hospital Day: 3  Attending: William Randolph MD  Date of service: 4/23/19    Presenting complaint:   Chief Complaint   Patient presents with    Toe Pain     left foot infection, odor started Friday. Reason for admission: Diabetic foot infection ([facility] [E11.628, L08.9]  Diabetic foot infection ([facility] [S55.065, L08.9]    Chief complaint/ Reason for consult: Left diabetic foot infection with osteomyelitis     Problem list:       Patient Active Problem List   Diagnosis Code    Type 2 diabetes mellitus with left diabetic foot infection ([facility] E11.628, L08.9    Mixed hyperlipidemia E78.2    Migraine headache G43.909    Leucocytosis D72.829    Anemia D64.9    Diabetic foot infection ([facility] E11.628, L08.9    Pyogenic inflammation of bone ([facility] M86.9    Essential hypertension I10    History of migraine Z86.69    History of medication noncompliance Z91.14    Overweight (BMI 25.0-29. 9) E66.3         Microbiology:        I have reviewed allavailable micro lab data and cultures    · Blood culture (2/2) - collected on 4/21/2019: In process  · Left foot wound culture  - collected on 4/21/2019: In process        Assessment:     The patient is a 37 y.o. old female who  has a past medical history of Diabetes mellitus out of control ([facility] Diabetes mellitus, type II ([facility] Generalized headaches, Infertility, Insomnia, Migraine headache (11/9/2011), Mixed hyperlipidemia, Otitis media, Pelvic abscess in female (10/5/2013), and Pneumonia. with following problems:    · Complicated left diabetic foot infection  · Left hallux osteomyelitis  · Poorly controlled type 2 diabetes mellitus  · Essential hypertension  · Mixed hyperlipidemia  · History of migraine  · Noncompliance with medical treatment  · Overweight      Discussion:      Her serum glucose was 504 on admission. Hemoglobin A1c was 13.4.     White cell count was 15,600        Plan: Diagnostic Workup:    · Agree with blood cultures and left foot wound culture  · Will order nasal MRSA screen  · Will order baseline sed rate and CRP  · Continue to follow fever curve, WBC count and blood cultures  · Follow up on liverand renal functions closely  · Will order urine tox screen    Antimicrobials:    · Will continue IV linezolid 600 mg every 12 hours   · Will order IV cefepime 2 g every 12 hours   · Will follow-up on her culture results and make further changes in the antibiotics accordingly  · Pain control  · The patient will likely need surgical debridement of the left hallux. Discussed this with the patient, however, she feels very overwhelmed and unfortunately does not seem to have realistic understanding of her severe left hallux infection  · Maintain good glycemic control  · Podiatry following. Will follow-up on the surgical plans  · Fall precautions  · DVT prophylaxis   · Discussed the above plan with patient and RN       Drug Monitoring:    · Continue serial monitoring for antibiotic toxicity as follows: CBC, CMP   · Continue to watch for following: new or worsening fever, hypotension, hives, lip swelling and redness or purulence at vascular access sites. I/v access Management:    · Continue to monitor i.v access sites for erythema, induration, discharge or tenderness. · As always, continue efforts to minimizetubes/lines/drains as clinically appropriate to reduce chances of line associated infections. Patient education and counseling:      · The patient was educated in detailabout the side-effects of various antibiotics and things to watch for like new rashes, lip swelling, severe reaction, worsening diarrhea, break through fever etc.  · Discussed patient'scondition and what to expect. All of the patient's questions were addressed in a satisfactory manner and patient verbalized understanding all instructions.     Risk of Complications/Morbidity: High     · Illness(es)/ Infection Surgical History:   Procedure Laterality Date    CERVIX SURGERY      laser tx for dysplasia;1992       Social History:  All social andepidemiologic history was reviewed and updated by me today as needed. · Tobacco use:   reports that she has been smoking cigarettes. She has been smoking about 1.00 pack per day. She has never used smokeless tobacco.  · Alcohol use:   reports that she does not drink alcohol. · Currently lives in: [facility]  ·  reports that she does not use drugs. Immunization History: All immunization history was reviewed by me today. Immunization History   Administered Date(s) Administered    Influenza Virus Vaccine 11/09/2011, 10/05/2013    Tdap (Boostrix, Adacel) 07/16/2018       Family History: All family history was reviewed today. Problem Relation Age of Onset    Diabetes Mother     Diabetes Father     High Blood Pressure Father     Cancer Father         colon    Diabetes Sister     Diabetes Paternal Grandmother          Medications: All current and past medications were reviewed. Medications Prior to Admission: gabapentin (NEURONTIN) 100 MG capsule, Take 100 mg by mouth 3 times daily. .  Naproxen Sodium (ALEVE) 220 MG CAPS, Take 220 mg by mouth as needed. Takes prn:  Possibly 2-4 once or twice per week. lisinopril (PRINIVIL) 10 MG tablet, Take 1 tablet by mouth daily. insulin glargine (LANTUS) 100 UNIT/ML injection, Inject 45 Units into the skin daily. glucose blood VI test strips (VICTORY AGM-4000 TEST) strip, Test four times daily until controlled and then three times daily. Code 250.02 ONE TOUCH ULTRA MONITOR  insulin lispro (HUMALOG) 100 UNIT/ML injection, Inject  into the skin 3 times daily (before meals). SLIDING SCALE SC BEFORE MEALS  Insulin Pen Needle (B-D UF III MINI PEN NEEDLES) 31G X 5 MM MISC, by Does not apply route.      insulin lispro  5 Units Subcutaneous TID WC    linezolid  600 mg Intravenous Q12H    ceFAZolin  1 g Intravenous Q8H    gentamicin   Topical Daily    gabapentin  100 mg Oral TID    insulin glargine  45 Units Subcutaneous Daily    lisinopril  10 mg Oral Daily    sodium chloride flush  10 mL Intravenous 2 times per day    enoxaparin  40 mg Subcutaneous Daily    insulin lispro  0-12 Units Subcutaneous TID WC    insulin lispro  0-6 Units Subcutaneous Nightly       Current antibiotics: All antibiotics and their doses were reviewed by me    Recent Abx Admin                   ceFAZolin (ANCEF) 1 g in dextrose 5 % 50 mL IVPB (mini-bag) (g) 1 g New Bag 04/23/19 0606     1 g New Bag 04/22/19 2159     1 g New Bag  1349    linezolid (ZYVOX) IVPB 600 mg (mg) 600 mg New Bag 04/23/19 0153     600 mg New Bag 04/22/19 1437                   REVIEW OF SYSTEMS:       Review of Systems   Constitutional: Negative for chills, diaphoresis and unexpected weight change. HENT: Negative for congestion, ear discharge, ear pain, facial swelling, hearing loss, rhinorrhea and trouble swallowing. Eyes: Negative for photophobia, discharge, redness and visual disturbance. Respiratory: Negative for apnea, cough, choking, chest tightness, shortness of breath and stridor. Cardiovascular: Negative for chest pain and palpitations. Gastrointestinal: Negative for abdominal pain, blood in stool, diarrhea and nausea. Endocrine: Negative for polydipsia, polyphagia and polyuria. Genitourinary: Negative for difficulty urinating, dysuria, frequency, hematuria, menstrual problem and vaginal discharge. Musculoskeletal: Positive for arthralgias. Negative for joint swelling, myalgias and neck stiffness. Deep ulcer and swelling of the left hallux   Skin: Negative for color change and rash. Allergic/Immunologic: Negative for immunocompromised state. Neurological: Negative for dizziness, seizures, speech difficulty, light-headedness and headaches. Hematological: Negative for adenopathy.    Psychiatric/Behavioral: Negative for agitation, 39.8      MCV 80.9   MCH 26.6   MCHC 32.8   RDW 15.5*        BMP:  Recent Labs     04/21/19  1905   *   K 4.2   CL 94*   CO2 28   BUN 14   CREATININE 0.9   CALCIUM 9.3   GLUCOSE 504*        Hepatic FunctionPanel:   Lab Results   Component Value Date    ALKPHOS 170 04/21/2019    ALT 14 04/21/2019    AST 16 04/21/2019    PROT 7.6 04/21/2019    PROT 6.8 11/09/2011    BILITOT <0.2 04/21/2019    LABALBU 3.8 04/21/2019       CPK: No results found for: CKTOTAL  ESR: No results found for: SEDRATE  CRP: No results found for: CRP      Imaging: All pertinent images and reports for the current visit were reviewed by meduring this visit. MRI FOOT LEFT W WO CONTRAST   Final Result   Soft tissue defect overlying the 1st phalanx. Although there is no cortical   disruption or T1 marrow signal abnormality, there is marrow edema within the   distal 1st phalanx and within the distal aspect of the 1st metatarsal.  These   findings are suggestive of subtle/early osteomyelitis. No evidence for   abscess formation. XR FOOT LEFT (MIN 3 VIEWS)   Final Result   Soft tissue defect and soft tissue edema of the 1st digit. No evidence for   osteomyelitis. Outside records:    Labs, Microbiology, Radiology and pertinent results from Care everywhere, if available, were reviewed as a part ofthe consultation. Known drug Allergies: All allergies were reviewed and updated    Allergies   Allergen Reactions    Amoxicillin     Levofloxacin     Vancomycin Hives         Please note that this chart was generated using Dragon dictation software. Although every effort was made to ensure the accuracy of this automated transcription, some errors in transcription may have occurred inadvertently. If you may need any clarification, please do not hesitate to contact me through EPIC or at the phone number provided below with my electronic signature.       Cheryle Dad, MD, MPH  4/23/2019, 1:24 PM  [facility]

## 2019-04-23 NOTE — PROGRESS NOTES
Assessment complete Denies SOB Medicated for foot pain(See MAR) Call light in reach will continue to monitor

## 2019-04-23 NOTE — PROGRESS NOTES
Scheduled medications given. Pt denies any further needs at this time. Call light within reach. Will continue to monitor.

## 2019-04-24 PROBLEM — M86.9 OSTEOMYELITIS OF LEFT FOOT (HCC): Status: ACTIVE | Noted: 2019-04-24

## 2019-04-24 LAB
ANION GAP SERPL CALCULATED.3IONS-SCNC: 6 MMOL/L (ref 3–16)
BASOPHILS ABSOLUTE: 0 K/UL (ref 0–0.2)
BASOPHILS RELATIVE PERCENT: 0.4 %
BUN BLDV-MCNC: 13 MG/DL (ref 7–20)
CALCIUM SERPL-MCNC: 8.6 MG/DL (ref 8.3–10.6)
CHLORIDE BLD-SCNC: 103 MMOL/L (ref 99–110)
CO2: 30 MMOL/L (ref 21–32)
CREAT SERPL-MCNC: 0.8 MG/DL (ref 0.6–1.1)
EOSINOPHILS ABSOLUTE: 0.4 K/UL (ref 0–0.6)
EOSINOPHILS RELATIVE PERCENT: 3.8 %
GFR AFRICAN AMERICAN: >60
GFR NON-AFRICAN AMERICAN: >60
GLUCOSE BLD-MCNC: 119 MG/DL (ref 70–99)
GLUCOSE BLD-MCNC: 151 MG/DL (ref 70–99)
GLUCOSE BLD-MCNC: 168 MG/DL (ref 70–99)
GLUCOSE BLD-MCNC: 181 MG/DL (ref 70–99)
GLUCOSE BLD-MCNC: 192 MG/DL (ref 70–99)
HCT VFR BLD CALC: 34.7 % (ref 36–48)
HEMOGLOBIN: 11.3 G/DL (ref 12–16)
LYMPHOCYTES ABSOLUTE: 3.3 K/UL (ref 1–5.1)
LYMPHOCYTES RELATIVE PERCENT: 31.1 %
MCH RBC QN AUTO: 26.4 PG (ref 26–34)
MCHC RBC AUTO-ENTMCNC: 32.6 G/DL (ref 31–36)
MCV RBC AUTO: 80.8 FL (ref 80–100)
MONOCYTES ABSOLUTE: 0.6 K/UL (ref 0–1.3)
MONOCYTES RELATIVE PERCENT: 6.1 %
MRSA SCREEN RT-PCR: NORMAL
NEUTROPHILS ABSOLUTE: 6.2 K/UL (ref 1.7–7.7)
NEUTROPHILS RELATIVE PERCENT: 58.6 %
PDW BLD-RTO: 15.5 % (ref 12.4–15.4)
PERFORMED ON: ABNORMAL
PLATELET # BLD: 272 K/UL (ref 135–450)
PMV BLD AUTO: 7.5 FL (ref 5–10.5)
POTASSIUM SERPL-SCNC: 4.7 MMOL/L (ref 3.5–5.1)
RBC # BLD: 4.29 M/UL (ref 4–5.2)
SODIUM BLD-SCNC: 139 MMOL/L (ref 136–145)
WBC # BLD: 10.5 K/UL (ref 4–11)

## 2019-04-24 PROCEDURE — 6370000000 HC RX 637 (ALT 250 FOR IP): Performed by: INTERNAL MEDICINE

## 2019-04-24 PROCEDURE — 6360000002 HC RX W HCPCS: Performed by: INTERNAL MEDICINE

## 2019-04-24 PROCEDURE — 80048 BASIC METABOLIC PNL TOTAL CA: CPT

## 2019-04-24 PROCEDURE — 6360000002 HC RX W HCPCS: Performed by: FAMILY MEDICINE

## 2019-04-24 PROCEDURE — 36415 COLL VENOUS BLD VENIPUNCTURE: CPT

## 2019-04-24 PROCEDURE — 99233 SBSQ HOSP IP/OBS HIGH 50: CPT | Performed by: INTERNAL MEDICINE

## 2019-04-24 PROCEDURE — 2580000003 HC RX 258: Performed by: INTERNAL MEDICINE

## 2019-04-24 PROCEDURE — 1200000000 HC SEMI PRIVATE

## 2019-04-24 PROCEDURE — 2580000003 HC RX 258: Performed by: FAMILY MEDICINE

## 2019-04-24 PROCEDURE — 6370000000 HC RX 637 (ALT 250 FOR IP): Performed by: FAMILY MEDICINE

## 2019-04-24 PROCEDURE — 85025 COMPLETE CBC W/AUTO DIFF WBC: CPT

## 2019-04-24 RX ORDER — SODIUM CHLORIDE 0.9 % (FLUSH) 0.9 %
10 SYRINGE (ML) INJECTION PRN
Status: DISCONTINUED | OUTPATIENT
Start: 2019-04-24 | End: 2019-04-25 | Stop reason: HOSPADM

## 2019-04-24 RX ORDER — LIDOCAINE HYDROCHLORIDE 10 MG/ML
5 INJECTION, SOLUTION EPIDURAL; INFILTRATION; INTRACAUDAL; PERINEURAL ONCE
Status: DISCONTINUED | OUTPATIENT
Start: 2019-04-24 | End: 2019-04-25 | Stop reason: HOSPADM

## 2019-04-24 RX ORDER — METRONIDAZOLE 250 MG/1
500 TABLET ORAL EVERY 8 HOURS SCHEDULED
Status: DISCONTINUED | OUTPATIENT
Start: 2019-04-24 | End: 2019-04-25

## 2019-04-24 RX ORDER — SODIUM CHLORIDE 0.9 % (FLUSH) 0.9 %
10 SYRINGE (ML) INJECTION EVERY 12 HOURS SCHEDULED
Status: DISCONTINUED | OUTPATIENT
Start: 2019-04-24 | End: 2019-04-25 | Stop reason: HOSPADM

## 2019-04-24 RX ADMIN — MORPHINE SULFATE 4 MG: 4 INJECTION INTRAVENOUS at 18:18

## 2019-04-24 RX ADMIN — METRONIDAZOLE 500 MG: 250 TABLET, FILM COATED ORAL at 21:28

## 2019-04-24 RX ADMIN — INSULIN LISPRO 2 UNITS: 100 INJECTION, SOLUTION INTRAVENOUS; SUBCUTANEOUS at 18:18

## 2019-04-24 RX ADMIN — MORPHINE SULFATE 4 MG: 4 INJECTION INTRAVENOUS at 13:37

## 2019-04-24 RX ADMIN — Medication 10 ML: at 13:36

## 2019-04-24 RX ADMIN — INSULIN LISPRO 5 UNITS: 100 INJECTION, SOLUTION INTRAVENOUS; SUBCUTANEOUS at 13:30

## 2019-04-24 RX ADMIN — CEFAZOLIN 1 G: 1 INJECTION, POWDER, FOR SOLUTION INTRAMUSCULAR; INTRAVENOUS at 04:05

## 2019-04-24 RX ADMIN — MORPHINE SULFATE 4 MG: 4 INJECTION INTRAVENOUS at 09:08

## 2019-04-24 RX ADMIN — Medication 10 ML: at 22:39

## 2019-04-24 RX ADMIN — MORPHINE SULFATE 4 MG: 4 INJECTION INTRAVENOUS at 22:38

## 2019-04-24 RX ADMIN — Medication 10 ML: at 18:19

## 2019-04-24 RX ADMIN — METRONIDAZOLE 500 MG: 250 TABLET, FILM COATED ORAL at 13:36

## 2019-04-24 RX ADMIN — LISINOPRIL 10 MG: 10 TABLET ORAL at 09:08

## 2019-04-24 RX ADMIN — INSULIN LISPRO 2 UNITS: 100 INJECTION, SOLUTION INTRAVENOUS; SUBCUTANEOUS at 13:31

## 2019-04-24 RX ADMIN — CEFEPIME HYDROCHLORIDE 2 G: 2 INJECTION, POWDER, FOR SOLUTION INTRAVENOUS at 13:37

## 2019-04-24 RX ADMIN — Medication 10 ML: at 21:37

## 2019-04-24 RX ADMIN — CEFEPIME HYDROCHLORIDE 2 G: 2 INJECTION, POWDER, FOR SOLUTION INTRAVENOUS at 00:41

## 2019-04-24 RX ADMIN — INSULIN LISPRO 5 UNITS: 100 INJECTION, SOLUTION INTRAVENOUS; SUBCUTANEOUS at 18:17

## 2019-04-24 RX ADMIN — INSULIN LISPRO 5 UNITS: 100 INJECTION, SOLUTION INTRAVENOUS; SUBCUTANEOUS at 09:09

## 2019-04-24 RX ADMIN — LINEZOLID 600 MG: 600 INJECTION, SOLUTION INTRAVENOUS at 00:40

## 2019-04-24 RX ADMIN — INSULIN LISPRO 2 UNITS: 100 INJECTION, SOLUTION INTRAVENOUS; SUBCUTANEOUS at 09:11

## 2019-04-24 RX ADMIN — ACETAMINOPHEN 650 MG: 325 TABLET, FILM COATED ORAL at 21:34

## 2019-04-24 RX ADMIN — Medication 10 ML: at 21:36

## 2019-04-24 RX ADMIN — GENTAMICIN SULFATE: 1 CREAM TOPICAL at 09:07

## 2019-04-24 RX ADMIN — MORPHINE SULFATE 4 MG: 4 INJECTION INTRAVENOUS at 04:05

## 2019-04-24 RX ADMIN — INSULIN GLARGINE 45 UNITS: 100 INJECTION, SOLUTION SUBCUTANEOUS at 09:09

## 2019-04-24 RX ADMIN — INSULIN LISPRO 1 UNITS: 100 INJECTION, SOLUTION INTRAVENOUS; SUBCUTANEOUS at 21:29

## 2019-04-24 RX ADMIN — Medication 10 ML: at 09:07

## 2019-04-24 RX ADMIN — ENOXAPARIN SODIUM 40 MG: 40 INJECTION SUBCUTANEOUS at 09:10

## 2019-04-24 ASSESSMENT — PAIN DESCRIPTION - ORIENTATION
ORIENTATION: LEFT

## 2019-04-24 ASSESSMENT — PAIN DESCRIPTION - LOCATION
LOCATION: TOE (COMMENT WHICH ONE)

## 2019-04-24 ASSESSMENT — PAIN DESCRIPTION - FREQUENCY
FREQUENCY: CONTINUOUS
FREQUENCY: CONTINUOUS

## 2019-04-24 ASSESSMENT — PAIN SCALES - GENERAL
PAINLEVEL_OUTOF10: 0
PAINLEVEL_OUTOF10: 6
PAINLEVEL_OUTOF10: 8
PAINLEVEL_OUTOF10: 7
PAINLEVEL_OUTOF10: 6
PAINLEVEL_OUTOF10: 8

## 2019-04-24 ASSESSMENT — PAIN DESCRIPTION - DESCRIPTORS
DESCRIPTORS: THROBBING
DESCRIPTORS: THROBBING

## 2019-04-24 ASSESSMENT — ENCOUNTER SYMPTOMS
RHINORRHEA: 0
COLOR CHANGE: 0
BLOOD IN STOOL: 0
FACIAL SWELLING: 0
ABDOMINAL PAIN: 0
PHOTOPHOBIA: 0
NAUSEA: 0
EYE DISCHARGE: 0
SHORTNESS OF BREATH: 0
EYE REDNESS: 0
DIARRHEA: 0
CHEST TIGHTNESS: 0
CHOKING: 0
COUGH: 0
STRIDOR: 0
TROUBLE SWALLOWING: 0
APNEA: 0

## 2019-04-24 ASSESSMENT — PAIN DESCRIPTION - PAIN TYPE
TYPE: ACUTE PAIN

## 2019-04-24 ASSESSMENT — PAIN DESCRIPTION - PROGRESSION: CLINICAL_PROGRESSION: GRADUALLY IMPROVING

## 2019-04-24 ASSESSMENT — PAIN SCALES - WONG BAKER
WONGBAKER_NUMERICALRESPONSE: 0
WONGBAKER_NUMERICALRESPONSE: 0

## 2019-04-24 NOTE — PROGRESS NOTES
Head to toe assessment complete. Vital signs obtained. Pt resting in bed at this time. AM medication administered. Pt tolerated well. Pt requesting pain medication. Medication administered per order. Pt tolerated well. Denies further needs at this time. Call light in hand. Will continue to monitor.  Electronically signed by Ramses Kyle RN on 4/24/2019 at 9:22 AM

## 2019-04-24 NOTE — PROGRESS NOTES
migraine  · Noncompliance with medical treatment  · Overweight    Discussion:      Left foot wound swab specimen Gram stain showed gram-positive cocci and gram-negative rods. Culture has only grown group B streptococcus so far    Sed rate was 42 and CRP was 5.2. Nasal MRSA screen is negative    Podiatry note reviewed. They're planning for conservative management only with IV antibiotics. Plan:     Diagnostic Workup:    · Continue to follow  fever curve, WBC count and blood cultures  · Follow up on liver and renal function    Antimicrobials:    · Will continue IV vancomycin for now  Check Vancomycin trough before the 5th dose. Target vancomycin trough of around 15. Keep vancomycin trough below 20 at all times. Avoid increasing the dose of vancomycin above a total of 4 grams in a 24-hour period in patients younger than 45 years and above 3 grams in a 24-hour period in a patient of age 39 years or older. Continue to monitor serum creatinine and Vanco levels closely, while the patient is on I/v Vancomycin. · Continue IV cefepime 2 g every 12 hours  · Will order by mouth Flagyl 500 mg every 8 hours  · If patient agreeable to PICC line placement, we'll plan for one  · Remains at high risk of requiring left hallux amputation  · Maintain good glycemic control  · If no other organism isolated, we'll plan to switch IV cefepime to IV ceftriaxone tomorrow and stop IV vancomycin. Tentative IV antibiotic plan will be as follows, it may need to be changed if more organisms grow  · Discussed the above plan with patient and RN   · Discussed all above with Dr. Randi Hoyos has been updated with infusion orders as follows:                           Out-patient antibiotic therapy (OPAT)/ Antibiotic Infusion orders          Diagnosis: Left diabetic foot infection       Organism/ culture: Group B streptococcus     Name and dose of Antimicrobial: IV ceftriaxone 2 g every 24 hours, by mouth Flagyl 500 mg every 8 hours     Antimicrobial start date: calculated from 4/22/19     Antimicrobial completion date planned: Stop date for oral Flagyl will be nMay 10, 2019 and stop date for IV ceftriaxone will be Jennie 3, 2019     Lab monitoring: CBC, Chem 12, ESR, CRPonce a week, to be       collected every Monday or Tuesday morning until the patient is on IV          antibiotics. Fax weekly lab results to Luis Manuel Kirby MD's office at        [phone]       ** Please notify Luis Manuel Kirby MD's office with any change in patient's        status, transfer out of facility or to hospital by calling [phone]           Outpatient Follow up: Follow-up with Luis Manuel Kirby MD in [facility] or [facility] in 4-6 weeks. Physician Signature:  Electronically signed by Luis Manuel Kirby MD on                                                               4/24/19 at 1:03 PM             Drug Monitoring:    · Continue monitoring for antibiotic toxicity as follows: CMP  · Continue to watch for following: new or worsening fever, new hypotension, hives, lip swelling and redness or purulence at vascular access sites. I/v access Management:    · Continue to monitor i.v access sites for erythema, induration,discharge or tenderness. · As always, continue efforts to minimize tubes/ lines/ drains as clinically appropriate to reduce chances of line associated infections. Patient education and counseling:    · The patient was educated in detail about the side-effects of various antibiotics and things to watch for like new rashes, lip swelling, severe reaction, worsening diarrhea, break through fever etc.  · Discussed patient's condition and what to expect. All of the patient's questions were addressed in a satisfactory manner and patient verbalized understanding all instructions.      Risk of Complications/Morbidity: High     TIME SPENT TODAY:     - Spent over  37  minutes on visit (including interval history, physical exam, review of data including labs, cultures, imaging, development and implementation of treatment plan and coordination of complex care). - Over 50% of time spent with patient face to face on counseling and education. Thank you for involving me in the care of your patient. I will continue to follow. If you have any additional questions, please do not hesitate to contact me. Subjective: Interval history: Patient was seen and examined at bedside. Interval history was obtained. The patient feels tired. She is tolerating IV antibiotics okay. Has ongoing pain in the left hallux      REVIEW OF SYSTEMS:      Review of Systems   Constitutional: Negative for chills, diaphoresis and unexpected weight change. HENT: Negative for congestion, ear discharge, ear pain, facial swelling, hearing loss, rhinorrhea and trouble swallowing. Eyes: Negative for photophobia, discharge, redness and visual disturbance. Respiratory: Negative for apnea, cough, choking, chest tightness, shortness of breath and stridor. Cardiovascular: Negative for chest pain and palpitations. Gastrointestinal: Negative for abdominal pain, blood in stool, diarrhea and nausea. Endocrine: Negative for polydipsia, polyphagia and polyuria. Genitourinary: Negative for difficulty urinating, dysuria, frequency, hematuria, menstrual problem and vaginal discharge. Musculoskeletal: Positive for arthralgias (left hallux pain and swelling and redness with purulence). Negative for joint swelling, myalgias and neck stiffness. Skin: Negative for color change and rash. Allergic/Immunologic: Negative for immunocompromised state. Neurological: Negative for dizziness, seizures, speech difficulty, light-headedness and headaches. Hematological: Negative for adenopathy. Psychiatric/Behavioral: Negative for agitation, hallucinations and suicidal ideas.          Past Medical History: All past medical history reviewed today. Past Medical History:   Diagnosis Date    Diabetes mellitus out of control ([facility]     Diabetes mellitus, type II ([facility]     2005    Generalized headaches     Infertility     Insomnia     chronic vs lack of time spent to sleep    Migraine headache 11/9/2011    Mixed hyperlipidemia     Otitis media     h/o recurrent    Pelvic abscess in female 10/5/2013    Pneumonia     2004 approx. Past Surgical History: All past surgical history was reviewed today. Past Surgical History:   Procedure Laterality Date    CERVIX SURGERY      laser tx for dysplasia;1992         Immunization History: All immunization history was reviewed by me today. Immunization History   Administered Date(s) Administered    Influenza Virus Vaccine 11/09/2011, 10/05/2013    Tdap (Boostrix, Adacel) 07/16/2018       Family History: All family history was reviewed today. Problem Relation Age of Onset    Diabetes Mother     Diabetes Father     High Blood Pressure Father     Cancer Father         colon    Diabetes Sister     Diabetes Paternal Grandmother        Objective:       PHYSICAL EXAM:      Vitals:   Vitals:    04/23/19 2122 04/24/19 0527 04/24/19 0906 04/24/19 1200   BP: 130/78 (!) 150/88 (!) 174/98 (!) 147/89   Pulse: 96 92 95 97   Resp: 18 18 18 18   Temp: 98.2 °F (36.8 °C) 98.1 °F (36.7 °C) 98.3 °F (36.8 °C) 98.2 °F (36.8 °C)   TempSrc: Oral Oral Oral Oral   SpO2: 96% 93% 100% 94%   Weight:       Height:           Physical Exam   Constitutional: She is oriented to person, place, and time. She appears well-developed. No distress. HENT:   Head: Normocephalic. Right Ear: External ear normal.   Left Ear: External ear normal.   Nose: Nose normal.   Mouth/Throat: Oropharynx is clear and moist.   Eyes: Pupils are equal, round, and reactive to light. Conjunctivae are normal. Right eye exhibits no discharge. Left eye exhibits no discharge. No scleral icterus. Neck: Normal range of motion. Neck supple. Cardiovascular: Normal rate and regular rhythm. Exam reveals no friction rub. No murmur heard. Pulmonary/Chest: Effort normal. No stridor. She has no wheezes. She has no rales. She exhibits no tenderness. Abdominal: Soft. She exhibits no mass. There is no tenderness. There is no rebound and no guarding. Musculoskeletal: She exhibits edema and tenderness (left hallux area). Lymphadenopathy:     She has no cervical adenopathy. Neurological: She is alert and oriented to person, place, and time. She exhibits normal muscle tone. Skin: Skin is warm and dry. No rash noted. She is not diaphoretic. There is erythema. Psychiatric: She has a normal mood and affect. Judgment normal.   Nursing note and vitals reviewed. Lines: All vascular access sites are healthy with no local erythema, discharge or tenderness. Intake and output:    I/O last 3 completed shifts: In: 360 [P.O.:360]  Out: -     Lab Data:   All available labs and old records have been reviewed by me. CBC:  Recent Labs     04/21/19  1904 04/24/19  0629   WBC 15.6* 10.5   RBC 4.93 4.29   HGB 13.1 11.3*   HCT 39.8 34.7*    272   MCV 80.9 80.8   MCH 26.6 26.4   MCHC 32.8 32.6   RDW 15.5* 15.5*        BMP:  Recent Labs     04/21/19  1905 04/24/19  0629   * 139   K 4.2 4.7   CL 94* 103   CO2 28 30   BUN 14 13   CREATININE 0.9 0.8   CALCIUM 9.3 8.6   GLUCOSE 504* 119*        Hepatic Function Panel:   Lab Results   Component Value Date    ALKPHOS 170 04/21/2019    ALT 14 04/21/2019    AST 16 04/21/2019    PROT 7.6 04/21/2019    PROT 6.8 11/09/2011    BILITOT <0.2 04/21/2019    LABALBU 3.8 04/21/2019       CPK: No results found for: CKTOTAL  ESR:   Lab Results   Component Value Date    SEDRATE 43 (H) 04/23/2019     CRP:   Lab Results   Component Value Date    CRP 5.2 (H) 04/23/2019           Imaging: All pertinent images and reports for the current visit were reviewed by me during this visit.     MRI FOOT LEFT W WO CONTRAST   Final Result   Soft tissue defect overlying the 1st phalanx. Although there is no cortical   disruption or T1 marrow signal abnormality, there is marrow edema within the   distal 1st phalanx and within the distal aspect of the 1st metatarsal.  These   findings are suggestive of subtle/early osteomyelitis. No evidence for   abscess formation. XR FOOT LEFT (MIN 3 VIEWS)   Final Result   Soft tissue defect and soft tissue edema of the 1st digit. No evidence for   osteomyelitis. Medications: All current and past medications were reviewed.  lidocaine 1 % injection  5 mL Intradermal Once    sodium chloride flush  10 mL Intravenous 2 times per day    insulin lispro  5 Units Subcutaneous TID WC    cefepime  2 g Intravenous Q12H    gentamicin   Topical Daily    gabapentin  100 mg Oral TID    insulin glargine  45 Units Subcutaneous Daily    lisinopril  10 mg Oral Daily    sodium chloride flush  10 mL Intravenous 2 times per day    enoxaparin  40 mg Subcutaneous Daily    insulin lispro  0-12 Units Subcutaneous TID WC    insulin lispro  0-6 Units Subcutaneous Nightly        dextrose         sodium chloride flush, sodium chloride flush, magnesium hydroxide, ondansetron, glucose, dextrose, glucagon (rDNA), dextrose, acetaminophen, morphine    Current antibiotics: All antibiotics and their doses were reviewed by me today    Recent Abx Admin                   ceFAZolin (ANCEF) 1 g in dextrose 5 % 50 mL IVPB (mini-bag) (g) 1 g New Bag 04/24/19 0405     1 g New Bag 04/23/19 2143     1 g New Bag  1415    cefepime (MAXIPIME) 2 g IVPB minibag (g) 2 g New Bag 04/24/19 0041     2 g New Bag 04/23/19 1415    linezolid (ZYVOX) IVPB 600 mg (mg) 600 mg New Bag 04/24/19 0040     600 mg New Bag 04/23/19 1415                Known drug Allergies:  All allergies were reviewed and updated    Allergies   Allergen Reactions    Amoxicillin      Tolerates cephalosporins    Levofloxacin     Vancomycin

## 2019-04-24 NOTE — PROGRESS NOTES
Drsg change complete. Bedside rounding completed with Michael Adler RN. Pt denies needs at this time. White board updated. Call light in hand and pt verbalizes correct use. All needs within reach.  Electronically signed by Montse Arriola RN on 4/24/2019 at 7:41 PM

## 2019-04-24 NOTE — PROGRESS NOTES
Hospitalist Progress Note      PCP: No primary care provider on file. Date of Admission: 4/21/2019    LOS: 3    Chief Complaint:   Chief Complaint   Patient presents with    Toe Pain     left foot infection, odor started Friday. Case Summary:   42-year-old lady with history of diabetes poorly controlled, hyperlipidemia, history of migraines, who presented with left great toe pain with foul-smelling drainage for 2 days concerning for cellulitis with abscess. Active Hospital Problems    Diagnosis Date Noted    Osteomyelitis of left foot ([address] [M86.9] 04/24/2019    Pyogenic inflammation of bone (HCC) [M86.9]     Essential hypertension [I10]     History of migraine [Z86.69]     History of medication noncompliance [Z91.14]     Overweight (BMI 25.0-29. 9) [E66.3]     Diabetic foot infection ([address] [X12.531, L08.9] 04/21/2019    Leucocytosis [D72.829] 10/05/2013    Type 2 diabetes mellitus with left diabetic foot infection ([facility] [O07.321, L08.9]        Assessment/Plan:  Principal Problem:    Diabetic foot infection ([address] with Leucocytosis and osteomyelitis of the toe: Patient was seen by podiatry status post debridement of the wound. MRI of the foot was noted for probable distal phalanx osteomyelitis. Wound cultures noted for BHS Group B (Strep agalacticae)  -Continue antibiotic regimen  -We will discuss with infectious disease regarding antibiotic regimen in view of discharge complaining. If requiring 6 weeks of IV antibiotics, will get a PICC line in place  -ID and Podiatry following.   There is no plans for further debridement at this time  -Continue dressings    Active Problems:    Diabetes mellitus type 2, uncontrolled ([address]): Poorly controlled with A1c greater than 10.  -We'll place her on lispro 5 units 3 times a day with meals and a correctional sliding scale and continue basal bolus glargine 15 units at night  -I have underscored the need for good glucose control      Resolved Problems:    Leucocytosis          Medications:  Reviewed  Infusion Medications    dextrose       Scheduled Medications    lidocaine 1 % injection  5 mL Intradermal Once    sodium chloride flush  10 mL Intravenous 2 times per day    insulin lispro  5 Units Subcutaneous TID WC    cefepime  2 g Intravenous Q12H    linezolid  600 mg Intravenous Q12H    gentamicin   Topical Daily    gabapentin  100 mg Oral TID    insulin glargine  45 Units Subcutaneous Daily    lisinopril  10 mg Oral Daily    sodium chloride flush  10 mL Intravenous 2 times per day    enoxaparin  40 mg Subcutaneous Daily    insulin lispro  0-12 Units Subcutaneous TID WC    insulin lispro  0-6 Units Subcutaneous Nightly     PRN Meds: sodium chloride flush, sodium chloride flush, magnesium hydroxide, ondansetron, glucose, dextrose, glucagon (rDNA), dextrose, acetaminophen, morphine        DVT Prophylaxis: Subcut in enoxaparin  Diet: DIET CARB CONTROL;  Code Status: Full Code    Dispo: Anticipate discharge in the next 24-48 hrs    ____________________________________________________________________________    Subjective:   Overnight Events:   Pain controlled with pain medicines   no fevers, no chills  Leukocytosis resolved      Physical Exam Performed:  BP (!) 174/98   Pulse 95   Temp 98.3 °F (36.8 °C) (Oral)   Resp 18   Ht 5' 7\" (1.702 m)   Wt 168 lb (76.2 kg)   LMP 03/31/2019   SpO2 100%   BMI 26.31 kg/m²   General appearance: No apparent distress, appears stated age and cooperative. HEENT: Normocephalic, atraumatic, MMM, No sclera icterus/conjuctival palor  Neck: Supple, no thyromegally. No jugular venous distention. Respiratory:  Normal respiratory effort. Clear to auscultation, no Rales/Wheezes/Rhonchi. Cardiovascular: S1/S2 without murmurs, rubs or gallops. RRR  Abdomen: Soft, non-tender, non-distended, bowel sounds present. Musculoskeletal: No clubbing, cyanosis or edema bilaterally.   Dressed left hallux  Skin: Skin color, texture, turgor normal.  No rashes or lesions. Neurologic:  Cranial nerves: II-XII intact, KWAME, No focal sensory/motor deficits       Intake/Output Summary (Last 24 hours) at 4/24/2019 1147  Last data filed at 4/23/2019 1231  Gross per 24 hour   Intake 360 ml   Output --   Net 360 ml       Labs:   Recent Labs     04/21/19  1904 04/24/19  0629   WBC 15.6* 10.5   HGB 13.1 11.3*   HCT 39.8 34.7*    272      Recent Labs     04/21/19  1905 04/24/19  0629   * 139   K 4.2 4.7   CL 94* 103   CO2 28 30   BUN 14 13   CREATININE 0.9 0.8   CALCIUM 9.3 8.6   AST 16  --    ALT 14  --    BILITOT <0.2  --    ALKPHOS 170*  --      No results for input(s): Ellender Nye in the last 72 hours. Urinalysis:  No results found for: Tiffany Hives, BACTERIA, RBCUA, BLOODU, [address], [address]    Radiology:  MRI FOOT LEFT W WO CONTRAST   Final Result   Soft tissue defect overlying the 1st phalanx. Although there is no cortical   disruption or T1 marrow signal abnormality, there is marrow edema within the   distal 1st phalanx and within the distal aspect of the 1st metatarsal.  These   findings are suggestive of subtle/early osteomyelitis. No evidence for   abscess formation. XR FOOT LEFT (MIN 3 VIEWS)   Final Result   Soft tissue defect and soft tissue edema of the 1st digit. No evidence for   osteomyelitis.                  Sonja Morgan MD

## 2019-04-24 NOTE — DISCHARGE INSTR - COC
Restriction: {CHP DME Yes amt example:222594464}  Last Modified Barium Swallow with Video (Video Swallowing Test): {Done Not Done SAQC:139288432}    Treatments at the Time of Hospital Discharge:   Respiratory Treatments: ***  Oxygen Therapy:  {Therapy; copd oxygen:38060}  Ventilator:    {[facility] Vent DQLT:611090042}    Rehab Therapies: {THERAPEUTIC INTERVENTION:0330931374}  Weight Bearing Status/Restrictions: {[facility] Weight Bearin}  Other Medical Equipment (for information only, NOT a DME order):  {EQUIPMENT:322102394}  Other Treatments: ***    Patient's personal belongings (please select all that are sent with patient):  {[facility] DME Belongings:066983396}    RN SIGNATURE:  {Esignature:912663569}    CASE MANAGEMENT/SOCIAL WORK SECTION    Inpatient Status Date: ***    Readmission Risk Assessment Score:  Readmission Risk              Risk of Unplanned Readmission:        7           Discharging to Facility/ Agency   · Name:   · Address:  · Phone:  · Fax:    Dialysis Facility (if applicable)   · Name:  · Address:  · Dialysis Schedule:  · Phone:  · Fax:    / signature: {Esignature:116093055}    PHYSICIAN SECTION    Prognosis: {Prognosis:3551320868}    Condition at Discharge: [address] [place] [address] Patient Condition:044157060}    Rehab Potential (if transferring to Rehab): {Prognosis:1950780707}    Recommended Labs or Other Treatments After Discharge:                           Out-patient antibiotic therapy (OPAT)/ Antibiotic Infusion orders          Diagnosis: Left diabetic foot infection       Organism/ culture: Group B streptococcus     Name and dose of Antimicrobial: IV ceftriaxone 2 g every 24 hours, by mouth Flagyl 500 mg every 8 hours     Antimicrobial start date: calculated from 19     Antimicrobial completion date planned: Stop date for oral Flagyl will be nMay 10, 2019 and stop date for IV ceftriaxone will be Jennie 3, 2019     Lab monitoring: CBC, Chem 12, ESR, CRPonce a week, to be collected every Monday or Tuesday morning until the patient is on IV          antibiotics. Fax weekly lab results to Chayo Nix MD's office at        [phone]       ** Please notify Chayo Nix MD's office with any change in patient's        status, transfer out of facility or to hospital by calling [phone]           Outpatient Follow up: Follow-up with Chayo Nix MD in [facility] or [facility] ID clinic in 4-6 weeks. Physician Signature:  Electronically signed by Chayo Nix MD on                                                               4/24/19 at 1:03 PM           Physician Certification: I certify the above information and transfer of Vanessa Guadarrama  is necessary for the continuing treatment of the diagnosis listed and that she requires {Admit to Appropriate Level of Care:64314} for {GREATER/LESS:021424818} 30 days.      Update Admission H&P: {CHP DME Changes in SIGNN:824041585}    PHYSICIAN SIGNATURE:  {Esignature:068096357}

## 2019-04-24 NOTE — PROGRESS NOTES
Bedside rounding with Nuvia Weber RN. Pt resting in bed with eyes closed, respirations even and unlabored. Call light noted to be resting in pt's lap. All needs appear within reach. White board updated. Bed alarm set. Will continue to monitor.  Electronically signed by Mohamud Hurt RN on 4/24/2019 at 7:25 AM

## 2019-04-25 VITALS
HEART RATE: 94 BPM | RESPIRATION RATE: 16 BRPM | OXYGEN SATURATION: 98 % | BODY MASS INDEX: 26.37 KG/M2 | DIASTOLIC BLOOD PRESSURE: 80 MMHG | TEMPERATURE: 97.8 F | WEIGHT: 168 LBS | SYSTOLIC BLOOD PRESSURE: 148 MMHG | HEIGHT: 67 IN

## 2019-04-25 LAB
GLUCOSE BLD-MCNC: 120 MG/DL (ref 70–99)
GLUCOSE BLD-MCNC: 220 MG/DL (ref 70–99)
PERFORMED ON: ABNORMAL
PERFORMED ON: ABNORMAL

## 2019-04-25 PROCEDURE — 99233 SBSQ HOSP IP/OBS HIGH 50: CPT | Performed by: INTERNAL MEDICINE

## 2019-04-25 PROCEDURE — 6360000002 HC RX W HCPCS: Performed by: INTERNAL MEDICINE

## 2019-04-25 PROCEDURE — 2580000003 HC RX 258: Performed by: INTERNAL MEDICINE

## 2019-04-25 PROCEDURE — 6370000000 HC RX 637 (ALT 250 FOR IP): Performed by: FAMILY MEDICINE

## 2019-04-25 PROCEDURE — 6370000000 HC RX 637 (ALT 250 FOR IP): Performed by: INTERNAL MEDICINE

## 2019-04-25 PROCEDURE — 6360000002 HC RX W HCPCS: Performed by: FAMILY MEDICINE

## 2019-04-25 RX ORDER — OXYCODONE HYDROCHLORIDE AND ACETAMINOPHEN 5; 325 MG/1; MG/1
1 TABLET ORAL EVERY 8 HOURS PRN
Qty: 12 TABLET | Refills: 0 | Status: SHIPPED | OUTPATIENT
Start: 2019-04-25 | End: 2019-04-30

## 2019-04-25 RX ORDER — DOXYCYCLINE HYCLATE 100 MG
100 TABLET ORAL EVERY 12 HOURS SCHEDULED
Status: DISCONTINUED | OUTPATIENT
Start: 2019-04-25 | End: 2019-04-25 | Stop reason: HOSPADM

## 2019-04-25 RX ORDER — LINEZOLID 600 MG/1
600 TABLET, FILM COATED ORAL EVERY 12 HOURS SCHEDULED
Status: DISCONTINUED | OUTPATIENT
Start: 2019-04-25 | End: 2019-04-25 | Stop reason: HOSPADM

## 2019-04-25 RX ORDER — DOXYCYCLINE HYCLATE 100 MG
100 TABLET ORAL EVERY 12 HOURS SCHEDULED
Qty: 84 TABLET | Refills: 0 | Status: SHIPPED | OUTPATIENT
Start: 2019-04-25 | End: 2019-06-06

## 2019-04-25 RX ORDER — INSULIN GLARGINE 100 [IU]/ML
45 INJECTION, SOLUTION SUBCUTANEOUS DAILY
Qty: 1350 UNITS | Refills: 3 | Status: ON HOLD | OUTPATIENT
Start: 2019-04-25 | End: 2020-04-29 | Stop reason: HOSPADM

## 2019-04-25 RX ORDER — LINEZOLID 600 MG/1
600 TABLET, FILM COATED ORAL EVERY 12 HOURS SCHEDULED
Qty: 42 TABLET | Refills: 0 | Status: SHIPPED | OUTPATIENT
Start: 2019-04-25 | End: 2019-05-16

## 2019-04-25 RX ADMIN — ENOXAPARIN SODIUM 40 MG: 40 INJECTION SUBCUTANEOUS at 08:48

## 2019-04-25 RX ADMIN — MORPHINE SULFATE 4 MG: 4 INJECTION INTRAVENOUS at 08:48

## 2019-04-25 RX ADMIN — LISINOPRIL 10 MG: 10 TABLET ORAL at 08:47

## 2019-04-25 RX ADMIN — Medication 10 ML: at 08:48

## 2019-04-25 RX ADMIN — INSULIN LISPRO 5 UNITS: 100 INJECTION, SOLUTION INTRAVENOUS; SUBCUTANEOUS at 11:45

## 2019-04-25 RX ADMIN — GENTAMICIN SULFATE: 1 CREAM TOPICAL at 09:02

## 2019-04-25 RX ADMIN — CEFEPIME HYDROCHLORIDE 2 G: 2 INJECTION, POWDER, FOR SOLUTION INTRAVENOUS at 01:19

## 2019-04-25 RX ADMIN — INSULIN LISPRO 5 UNITS: 100 INJECTION, SOLUTION INTRAVENOUS; SUBCUTANEOUS at 09:04

## 2019-04-25 RX ADMIN — INSULIN LISPRO 4 UNITS: 100 INJECTION, SOLUTION INTRAVENOUS; SUBCUTANEOUS at 11:45

## 2019-04-25 RX ADMIN — MORPHINE SULFATE 4 MG: 4 INJECTION INTRAVENOUS at 03:20

## 2019-04-25 RX ADMIN — METRONIDAZOLE 500 MG: 250 TABLET, FILM COATED ORAL at 05:48

## 2019-04-25 RX ADMIN — MORPHINE SULFATE 4 MG: 4 INJECTION INTRAVENOUS at 13:04

## 2019-04-25 RX ADMIN — INSULIN GLARGINE 45 UNITS: 100 INJECTION, SOLUTION SUBCUTANEOUS at 09:04

## 2019-04-25 ASSESSMENT — PAIN SCALES - GENERAL
PAINLEVEL_OUTOF10: 5
PAINLEVEL_OUTOF10: 8
PAINLEVEL_OUTOF10: 6
PAINLEVEL_OUTOF10: 5

## 2019-04-25 ASSESSMENT — ENCOUNTER SYMPTOMS
RHINORRHEA: 0
FACIAL SWELLING: 0
CHEST TIGHTNESS: 0
STRIDOR: 0
EYE REDNESS: 0
PHOTOPHOBIA: 0
CHOKING: 0
EYE DISCHARGE: 0
DIARRHEA: 0
TROUBLE SWALLOWING: 0
COUGH: 0
COLOR CHANGE: 0
BLOOD IN STOOL: 0
SHORTNESS OF BREATH: 0
APNEA: 0
ABDOMINAL PAIN: 0
NAUSEA: 0

## 2019-04-25 NOTE — PROGRESS NOTES
meds to beds delivered to patient. Patient declined wheelchair transport, ambulated off the unit without incident.

## 2019-04-25 NOTE — PROGRESS NOTES
Patient discharged to home. AVS reviewed with patient, patient verbalized understanding discharge instructions. Patient waiting for prescriptions to be filled at pharmacy here before she is able to leave.

## 2019-04-25 NOTE — PROGRESS NOTES
CLINICAL PHARMACY NOTE: MEDS TO [address] Select Patient?: No  Total # of Prescriptions Filled: 6   The following medications were delivered to the patient:  · humalog  · Pen needles  · linezolid  · basaglar  · Doxycyline  · Oxycodone/APAP  Total # of Interventions Completed: 0  Time Spent (min): 75    Additional Documentation:  Delivered to patient  Suman

## 2019-04-25 NOTE — DISCHARGE SUMMARY
Hospital Medicine Discharge Summary    Patient ID: Lisa Lincoln      Patient's PCP: No primary care provider on file. Admit Date: 4/21/2019     Discharge Date:   4/25/2019     Admitting Physician: Ivette Quintanilla MD     Discharge Physician: Sonja Morgan MD     Discharge Diagnoses: Active Hospital Problems    Diagnosis Date Noted    Osteomyelitis of left foot ([facility] [M86.9] 04/24/2019    Pyogenic inflammation of bone (HCC) [M86.9]     Essential hypertension [I10]     History of migraine [Z86.69]     History of medication noncompliance [Z91.14]     Overweight (BMI 25.0-29. 9) [E66.3]     Diabetic foot infection ([facility] [V29.182, L08.9] 04/21/2019    Type 2 diabetes mellitus with left diabetic foot infection ([facility] [F80.531, L08.9]        The patient was seen and examined on day of discharge and this discharge summary is in conjunction with any daily progress note from day of discharge. Hospital Course: The patient is a 80-year-old lady with history of diabetes poorly controlled, hyperlipidemia, history of migraines, who presented with left great toe pain with foul-smelling drainage for 2 days found to have left hallux osteomyelitis    Assessment/Plan:  Principal Problem:    Diabetic foot infection ([address] with Leucocytosis and osteomyelitis of the toe: Patient was seen by podiatry status post debridement of the wound. MRI of the foot was noted for probable distal phalanx osteomyelitis. Wound cultures noted for polymicrobial infection with gram negatives and gram positives and isolated BHS Group B (Strep agalacticae) MRSA. Due to social and financial constraints she could not do IV antibiotics despite financial assistance. Following discussions with infectious disease, she was discharged on Zyvox 600 mg twice a day for 3 weeks as well as doxycycline 100 mg twice a day for 6 weeks. She will follow-up with ID in 2-3 weeks with CBCs, CMP, ESR, CRP.   Podiatry clinic follow up in 1-2 weeks    Active Problems:    Diabetes mellitus type 2, uncontrolled ([address]): Poorly controlled with A1c greater than 13.4%. -She will continue on glargine 45 units at night and sliding scale lispro  -I have underscored the need for good glucose control        Resolved Problems:    Leucocytosis          Physical Exam Performed:     BP (!) 148/80   Pulse 94   Temp 97.8 °F (36.6 °C) (Oral)   Resp 16   Ht 5' 7\" (1.702 m)   Wt 168 lb (76.2 kg)   LMP 03/31/2019   SpO2 98%   BMI 26.31 kg/m²       General appearance:  No apparent distress, appears stated age and cooperative. HEENT:  Normal cephalic, atraumatic without obvious deformity. Pupils equal, round, and reactive to light. Extra ocular muscles intact. Conjunctivae/corneas clear. Neck: Supple, with full range of motion. No jugular venous distention. Trachea midline. Respiratory:  Normal respiratory effort. Clear to auscultation, bilaterally without Rales/Wheezes/Rhonchi. Cardiovascular:  Regular rate and rhythm with normal S1/S2 without murmurs, rubs or gallops. Abdomen: Soft, non-tender, non-distended with normal bowel sounds. Musculoskeletal:  No clubbing, cyanosis or edema bilaterally. Full range of motion without deformity. Skin: Skin color, texture, turgor normal.  No rashes or lesions. Dressed left hallux  Neurologic:  Neurovascularly intact without any focal sensory/motor deficits. Cranial nerves: II-XII intact, grossly non-focal.  Psychiatric:  Alert and oriented, thought content appropriate, normal insight  Capillary Refill: Brisk,< 3 seconds   Peripheral Pulses: +2 palpable, equal bilaterally       Labs:  For convenience and continuity at follow-up the following most recent labs are provided:      CBC:    Lab Results   Component Value Date    WBC 10.5 04/24/2019    HGB 11.3 04/24/2019    HCT 34.7 04/24/2019     04/24/2019       Renal:    Lab Results   Component Value Date     04/24/2019    K 4.7 04/24/2019     04/24/2019    CO2 30 04/24/2019    BUN 13 04/24/2019    CREATININE 0.8 04/24/2019    CALCIUM 8.6 04/24/2019    PHOS 3.8 10/05/2013         Significant Diagnostic Studies    Radiology:   MRI FOOT LEFT W WO CONTRAST   Final Result   Soft tissue defect overlying the 1st phalanx. Although there is no cortical   disruption or T1 marrow signal abnormality, there is marrow edema within the   distal 1st phalanx and within the distal aspect of the 1st metatarsal.  These   findings are suggestive of subtle/early osteomyelitis. No evidence for   abscess formation. XR FOOT LEFT (MIN 3 VIEWS)   Final Result   Soft tissue defect and soft tissue edema of the 1st digit. No evidence for   osteomyelitis. Consults:     IP CONSULT TO HOSPITALIST  IP CONSULT TO PODIATRY  IP CONSULT TO INFECTIOUS DISEASES  IP CONSULT TO DIABETES EDUCATOR    Disposition:  Home with family     Condition at Discharge: Stable    Discharge Instructions/Follow-up:    PCP follow-up in 1-2 weeks  Infectious disease clinic follow-up in 2 weeks with CBC, CMP, ESR, CRP    Code Status:  Full Code     Activity: activity as tolerated    Diet: diabetic diet      Discharge Medications:     Current Discharge Medication List           Details   insulin lispro (HUMALOG) 100 UNIT/ML pen Inject 0-12 Units into the skin 3 times daily (with meals)  Qty: 5 pen, Refills: 3      Insulin Syringe-Needle U-100 30G X 5/16\" 1 ML MISC 1 each by Does not apply route daily  Qty: 100 each, Refills: 3      doxycycline hyclate (VIBRA-TABS) 100 MG tablet Take 1 tablet by mouth every 12 hours  Qty: 84 tablet, Refills: 0      linezolid (ZYVOX) 600 MG tablet Take 1 tablet by mouth every 12 hours for 21 days  Qty: 42 tablet, Refills: 0      oxyCODONE-acetaminophen (PERCOCET) 5-325 MG per tablet Take 1 tablet by mouth every 8 hours as needed for Pain for up to 5 days. Intended supply: 3 days.  Take lowest dose possible to manage pain  Qty: 12 tablet, Refills: 0    Comments: Reduce doses taken as pain becomes manageable  Associated Diagnoses: Osteomyelitis of left foot, unspecified type (HCC)              Details   insulin glargine (LANTUS) 100 UNIT/ML injection vial Inject 45 Units into the skin daily  Qty: 1350 Units, Refills: 3    Associated Diagnoses: Diabetes mellitus type 2, uncontrolled (HCC)              Details   gabapentin (NEURONTIN) 100 MG capsule Take 100 mg by mouth 3 times daily. .      Naproxen Sodium (ALEVE) 220 MG CAPS Take 220 mg by mouth as needed. Takes prn:  Possibly 2-4 once or twice per week. lisinopril (PRINIVIL) 10 MG tablet Take 1 tablet by mouth daily. Qty: 30 tablet, Refills: 3    Associated Diagnoses: Diabetes mellitus type 2, uncontrolled ([facility] [address] [address] Elevated BP      glucose blood VI test strips (VICTORY AGM-4000 TEST) strip Test four times daily until controlled and then three times daily. Code 250.02  ONE TOUCH ULTRA MONITOR  Qty: 100 strip, Refills: 12    Associated Diagnoses: Diabetes mellitus type 2, uncontrolled ([facility])      Insulin Pen Needle (B-D UF III MINI PEN NEEDLES) 31G X 5 MM MISC by Does not apply route. Qty: 100 each, Refills: 6    Associated Diagnoses: Type II or unspecified type diabetes mellitus without mention of complication, uncontrolled             Time Spent on discharge is more than 30 minutes in the examination, evaluation, counseling and review of medications and discharge plan. Signed: Jeffrey Abbasi MD   4/25/2019      Thank you No primary care provider on file. for the opportunity to be involved in this patient's care. If you have any questions or concerns please feel free to contact me at [phone].

## 2019-04-25 NOTE — PROGRESS NOTES
Infectious Diseases   Progress Note      Admission Date: 4/21/2019  Hospital Day: Hospital Day: 5  Attending: Ted Hernandez MD  Date of service: 4/25/2019    Presenting complaint:   Chief Complaint   Patient presents with    Toe Pain     left foot infection, odor started Friday. Chief complaint/ Reason for consult: The patient was seen today for the following:    · Complicated left diabetic foot infection  · Left hallux osteomyelitis  · Poorly controlled type 2 diabetes mellitus  · Essential hypertension    Microbiology:        I have reviewed all available micro lab data and cultures    · Blood culture (2/2) - collected on 4/21/2019: negative  · Left foot wound culture  - collected on 4/22/2019: Group B streptococcus, MRSA          Problem list:       Patient Active Problem List   Diagnosis Code    Type 2 diabetes mellitus with left diabetic foot infection ([facility] [address] E11.628, L08.9    Mixed hyperlipidemia E78.2    Migraine headache G43.909    Anemia D64.9    Diabetic foot infection ([facility] [address] E11.628, L08.9    Pyogenic inflammation of bone ([facility] [address] M86.9    Essential hypertension I10    History of migraine Z86.69    History of medication noncompliance Z91.14    Overweight (BMI 25.0-29. 9) E66.3    Osteomyelitis of left foot (HCC) M86.9         Assessment:     The patient is a 37 y.o. old female who  has a past medical history of Diabetes mellitus out of control ([facility] [address] Diabetes mellitus, type II ([facility] [address] Generalized headaches, Infertility, Insomnia, Migraine headache (11/9/2011), Mixed hyperlipidemia, Otitis media, Pelvic abscess in female (10/5/2013), and Pneumonia.  with following problems:    · Complicated left diabetic foot infection - surgical culture growing MRSA today in addition to group B streptococcus  · Left hallux osteomyelitis - IV antibiotic therapy not physical  · Poorly controlled type 2 diabetes mellitus - discussed importance of good glycemic control  · Essential hypertension - BP stable  · Mixed hyperlipidemia  · History of migraine  · Noncompliance with medical treatment  · Overweight    Discussion:      Left foot wound culture is now growing MRSA and group B streptococcus. IV antibiotic therapy is recommended   Therapy for osteomyelitis. However, patient does not have any insurance and cannot afford IV antibiotics at home. She was offered once daily IV antibiotic at infusion center which she is not also willing to get      Plan:     Diagnostic Workup:    · Continue to follow  fever curve, WBC count and blood cultures  · Follow up on liver and renal function    Antimicrobials:    · Unfortunately, the patient is not able to get IV antibiotics at home and is not willing to do IV antibiotics at infusion center  · The only option left at this time is oral antibiotics. I have told her that oral antibiotics not recommended and osteomyelitis and treatment failure is quite possible. She understands but wants to go with oral antibiotics  · Will recommend oral linezolid 600 mg every 12 hours. We'll recommend a 3 week course given potential of the linezolid to cause thrombocytopenia and patient may be lost to follow-up  · Recommend oral doxycycline 100 mg every 12 hours for 6 weeks  · Discussed the importance of maintaining good glycemic control  · Get a CBC, CMP, sed rate and CRP in 3 weeks and fax the results to me the number listed below  · Recommend follow-up with me in my office in 3-4 weeks  · Discussed all above with patient and RN  · Discussed with Dr. Becka Shelley    Drug Monitoring:    · Continue monitoring for antibiotic toxicity as follows: CMP, CBC  · Continue to watch for following: new or worsening fever, new hypotension, hives, lip swelling and redness or purulence at vascular access sites. I/v access Management:    · Continue to monitor i.v access sites for erythema, induration, discharge or tenderness.    · As always, continue efforts to minimize tubes/lines/drains as clinically appropriate to reduce chances of line associated infections. Patient education and counseling:    *    · The patient was educated in detail about the side-effects of various antibiotics and things to watch for like new rashes, lip swelling, severe reaction, worsening diarrhea, break through fever etc.  · Discussed patient's condition and what to expect. All of the patient's questions were addressed in a satisfactory manner and patient verbalized understanding all instructions. Doxycyline/minocycline related instructions: Take Doxycyline/minocycline with a full glass of water and stay upright or sitting up after taking it for 30 minutes as it can cause food pipe irritation (pill esophagitis). Use sunscreen when going in bright sun while on Doxycycline/minocycline as it can cause photosensitivity. Take 1 hour before or 2 hour after dairy, calcium, iron, magnesium, aluminum or zinc.    Weight loss counseling:    Extensive weight loss counseling was done. It is important to set a realistic weight loss goal. First goal should be to avoid gaining more weight and staying at current weight (or within 5 percent). People at high risk of developing diabetes who are able to lose 5 percent of their body weight and maintain this weight will reduce their risk of developing diabetes by about 50 percent and reduce their blood pressure. Losing more than 15 percent of  body weight and staying at this weight is an extremely good result, even if you never reach your \"dream\" or \"ideal\" weight. Lifestyle changes including changing eating habits, substituting excess carbohydrates with proteins, stress reduction, using self-help programs like Weight Watchers®, Overeaters Anonymous®, and Take Off Pounds Sensibly (TOPS)© , following DASH diet and increasing exercise or walking briskly daily for half hour to and hour 5-7 days a week was suggested among other measures.  Information was given about various weight loss education programs and their websites like www.cdc.gov/healthyweight, www.choosemyplate.gov and www.health.gov/dietaryguidelines/    TIME SPENT TODAY:     - Spent over  36  minutes on visit (including interval history, physical exam, review of data including labs, cultures, imaging, development and implementation of treatment plan and coordination of complex care). - Over 50% of time spent with patient face to face on counseling and education. Thank you for involving me in the care of your patient. I will continue to follow. If you have anyadditional questions, please do not hesitate to contact me. Subjective: Interval history: Patient was seen and examined at bedside. Interval history was obtained. She is afebrile. As an ulcer on the dorsal to the left foot. She is tolerating antibiotics okay     REVIEW OF SYSTEMS:      Review of Systems   Constitutional: Negative for chills, diaphoresis and unexpected weight change. HENT: Negative for congestion, ear discharge, ear pain, facial swelling, hearing loss, rhinorrhea and trouble swallowing. Eyes: Negative for photophobia, discharge, redness and visual disturbance. Respiratory: Negative for apnea, cough, choking, chest tightness, shortness of breath and stridor. Cardiovascular: Negative for chest pain and palpitations. Gastrointestinal: Negative for abdominal pain, blood in stool, diarrhea and nausea. Endocrine: Negative for polydipsia, polyphagia and polyuria. Genitourinary: Negative for difficulty urinating, dysuria, frequency, hematuria, menstrual problem and vaginal discharge. Musculoskeletal: Negative for arthralgias, joint swelling, myalgias and neck stiffness. Skin: Negative for color change and rash. Left hallux ulceration improving   Allergic/Immunologic: Negative for immunocompromised state. Neurological: Negative for dizziness, seizures, speech difficulty, light-headedness and headaches.    Hematological: Negative for adenopathy. Psychiatric/Behavioral: Negative for agitation, hallucinations and suicidal ideas. Past Medical History: All past medical history reviewed today. Past Medical History:   Diagnosis Date    Diabetes mellitus out of control ([facility] [address]     Diabetes mellitus, type II ([facility] [address]     2005    Generalized headaches     Infertility     Insomnia     chronic vs lack of time spent to sleep    Migraine headache 11/9/2011    Mixed hyperlipidemia     Otitis media     h/o recurrent    Pelvic abscess in female 10/5/2013    Pneumonia     2004 approx. Past Surgical History: All past surgical history was reviewed today. Past Surgical History:   Procedure Laterality Date    CERVIX SURGERY      laser tx for dysplasia;1992         Immunization History: All immunization history was reviewed by me today. Immunization History   Administered Date(s) Administered    Influenza Virus Vaccine 11/09/2011, 10/05/2013    Tdap (Boostrix, Adacel) 07/16/2018       Family History: All family history was reviewed today. Problem Relation Age of Onset    Diabetes Mother     Diabetes Father     High Blood Pressure Father     Cancer Father         colon    Diabetes Sister     Diabetes Paternal Grandmother        Objective:       PHYSICAL EXAM:      Vitals:   Vitals:    04/25/19 0115 04/25/19 0315 04/25/19 0845 04/25/19 1140   BP: (!) 150/84 (!) 147/79 (!) 145/83 (!) 148/80   Pulse: 91 91 92 94   Resp: 16 16 16 16   Temp: 98.3 °F (36.8 °C) 98 °F (36.7 °C) 98 °F (36.7 °C) 97.8 °F (36.6 °C)   TempSrc: Oral Oral Oral Oral   SpO2: 92% 97% 99% 98%   Weight:       Height:           Physical Exam   Constitutional: She is oriented to person, place, and time. She appears well-developed. No distress. HENT:   Head: Normocephalic.    Right Ear: External ear normal.   Left Ear: External ear normal.   Nose: Nose normal.   Mouth/Throat: Oropharynx is clear and moist.   Eyes: Pupils are equal, round, and reactive to light. Conjunctivae are normal. Right eye exhibits no discharge. Left eye exhibits no discharge. No scleral icterus. Neck: Normal range of motion. Neck supple. Cardiovascular: Normal rate and regular rhythm. Exam reveals no friction rub. No murmur heard. Pulmonary/Chest: Effort normal. No stridor. She has no wheezes. She has no rales. She exhibits no tenderness. Abdominal: Soft. She exhibits no mass. There is no tenderness. There is no rebound and no guarding. Musculoskeletal: She exhibits no edema or tenderness. Lymphadenopathy:     She has no cervical adenopathy. Neurological: She is alert and oriented to person, place, and time. She exhibits normal muscle tone. Skin: Skin is warm and dry. No rash noted. She is not diaphoretic. No erythema. Some purulence on the left hallux ulcer   Psychiatric: She has a normal mood and affect. Judgment normal.   Nursing note and vitals reviewed. Lines: All vascular access sites are healthy with no local erythema, discharge or tenderness. Intake and output:    No intake/output data recorded. Lab Data:   All available labs and old records have been reviewed by me. CBC:  Recent Labs     04/24/19  0629   WBC 10.5   RBC 4.29   HGB 11.3*   HCT 34.7*      MCV 80.8   MCH 26.4   MCHC 32.6   RDW 15.5*        BMP:  Recent Labs     04/24/19  0629      K 4.7      CO2 30   BUN 13   CREATININE 0.8   CALCIUM 8.6   GLUCOSE 119*        Hepatic Function Panel:   Lab Results   Component Value Date    ALKPHOS 170 04/21/2019    ALT 14 04/21/2019    AST 16 04/21/2019    PROT 7.6 04/21/2019    PROT 6.8 11/09/2011    BILITOT <0.2 04/21/2019    LABALBU 3.8 04/21/2019       CPK: No results found for: CKTOTAL  ESR:   Lab Results   Component Value Date    SEDRATE 43 (H) 04/23/2019     CRP:   Lab Results   Component Value Date    CRP 5.2 (H) 04/23/2019           Imaging:     All pertinent images and reports for the current visit were reviewed by me during this visit. MRI FOOT LEFT W WO CONTRAST   Final Result   Soft tissue defect overlying the 1st phalanx. Although there is no cortical   disruption or T1 marrow signal abnormality, there is marrow edema within the   distal 1st phalanx and within the distal aspect of the 1st metatarsal.  These   findings are suggestive of subtle/early osteomyelitis. No evidence for   abscess formation. XR FOOT LEFT (MIN 3 VIEWS)   Final Result   Soft tissue defect and soft tissue edema of the 1st digit. No evidence for   osteomyelitis. Medications: All current and past medications were reviewed.  linezolid  600 mg Oral 2 times per day    doxycycline hyclate  100 mg Oral 2 times per day    lidocaine 1 % injection  5 mL Intradermal Once    sodium chloride flush  10 mL Intravenous 2 times per day    insulin lispro  5 Units Subcutaneous TID WC    gentamicin   Topical Daily    gabapentin  100 mg Oral TID    insulin glargine  45 Units Subcutaneous Daily    lisinopril  10 mg Oral Daily    sodium chloride flush  10 mL Intravenous 2 times per day    enoxaparin  40 mg Subcutaneous Daily    insulin lispro  0-12 Units Subcutaneous TID WC    insulin lispro  0-6 Units Subcutaneous Nightly        dextrose         sodium chloride flush, sodium chloride flush, magnesium hydroxide, ondansetron, glucose, dextrose, glucagon (rDNA), dextrose, acetaminophen, morphine    Current antibiotics: All antibiotics and their doses were reviewed by me today    Recent Abx Admin                   metroNIDAZOLE (FLAGYL) tablet 500 mg (mg) 500 mg Given 04/25/19 0548     500 mg Given 04/24/19 2128     500 mg Given  1336    cefepime (MAXIPIME) 2 g IVPB minibag (g) 2 g New Bag 04/25/19 0119     2 g New Bag 04/24/19 1337                Known drug Allergies:  All allergies were reviewed and updated    Allergies   Allergen Reactions    Amoxicillin      Tolerates cephalosporins    Levofloxacin     Vancomycin Hives           Please note that this chart was generated using Dragon dictation software. Although every effort was made to ensure the accuracy of this automated transcription, some errors in transcription may have occurred inadvertently. If you may need any clarification, please do not hesitate to contact me through EPIC or at the phone number provided below with my electronic signature.     Alicia Tanner MD, MPH  4/25/2019, 12:10 PM  [facility] Infectious Disease   Office: [phone]  Fax: [phone]  Tuesday AM clinic:   [address] [facility] [address]  Thursday AM OGIXEC:[id number] [address] [place] [address]

## 2019-04-26 LAB
BLOOD CULTURE, ROUTINE: NORMAL
CULTURE, BLOOD 2: NORMAL
GRAM STAIN RESULT: ABNORMAL
ORGANISM: ABNORMAL
ORGANISM: ABNORMAL
WOUND/ABSCESS: ABNORMAL

## 2019-05-09 ENCOUNTER — OFFICE VISIT (OUTPATIENT)
Dept: INFECTIOUS DISEASES | Age: 44
End: 2019-05-09

## 2019-05-09 VITALS
HEART RATE: 122 BPM | SYSTOLIC BLOOD PRESSURE: 140 MMHG | WEIGHT: 197 LBS | DIASTOLIC BLOOD PRESSURE: 82 MMHG | BODY MASS INDEX: 30.85 KG/M2 | OXYGEN SATURATION: 98 %

## 2019-05-09 DIAGNOSIS — E66.09 CLASS 1 OBESITY DUE TO EXCESS CALORIES IN ADULT, UNSPECIFIED BMI, UNSPECIFIED WHETHER SERIOUS COMORBIDITY PRESENT: ICD-10-CM

## 2019-05-09 DIAGNOSIS — A49.1 GROUP B STREPTOCOCCAL INFECTION: ICD-10-CM

## 2019-05-09 DIAGNOSIS — Z71.3 WEIGHT LOSS COUNSELING, ENCOUNTER FOR: ICD-10-CM

## 2019-05-09 DIAGNOSIS — A49.02 MRSA INFECTION: ICD-10-CM

## 2019-05-09 DIAGNOSIS — Z71.89 DIABETES EDUCATION, ENCOUNTER FOR: ICD-10-CM

## 2019-05-09 DIAGNOSIS — Z71.89 ENCOUNTER FOR MEDICATION COUNSELING: ICD-10-CM

## 2019-05-09 DIAGNOSIS — E11.628 TYPE 2 DIABETES MELLITUS WITH LEFT DIABETIC FOOT INFECTION (HCC): ICD-10-CM

## 2019-05-09 DIAGNOSIS — L08.9 TYPE 2 DIABETES MELLITUS WITH LEFT DIABETIC FOOT INFECTION (HCC): ICD-10-CM

## 2019-05-09 DIAGNOSIS — I10 ESSENTIAL HYPERTENSION: ICD-10-CM

## 2019-05-09 DIAGNOSIS — M86.9 OSTEOMYELITIS OF LEFT FOOT, UNSPECIFIED TYPE (HCC): Primary | ICD-10-CM

## 2019-05-09 PROCEDURE — 99214 OFFICE O/P EST MOD 30 MIN: CPT | Performed by: INTERNAL MEDICINE

## 2019-05-09 ASSESSMENT — ENCOUNTER SYMPTOMS
EYE DISCHARGE: 0
ABDOMINAL PAIN: 0
RHINORRHEA: 0
CHEST TIGHTNESS: 0
DIARRHEA: 0
TROUBLE SWALLOWING: 0
FACIAL SWELLING: 0
BLOOD IN STOOL: 0
COLOR CHANGE: 0
APNEA: 0
CHOKING: 0
EYE REDNESS: 0
PHOTOPHOBIA: 0
COUGH: 0
NAUSEA: 0
SHORTNESS OF BREATH: 0
STRIDOR: 0

## 2019-05-09 NOTE — PROGRESS NOTES
Infectious Diseases Oupatient Follow-up Note      Reason for Visit:              Complicated left diabetic foot infection with the left hallux osteomyelitis  Primary Care Physician:  No primary care provider on file. History Obtained From:   Patient , Medical Records     CHIEF COMPLAINT:    Chief Complaint   Patient presents with    Follow-Up from Hospital     body in pain gained 30 lbs       INTERVAL HISTORY: Juanita Arguelles is a 37 y.o. female patient,who was seen today in ID clinic for follow-up. History was obtained from chart review and the patient. The patient was seen today for above mentioned complaints. The patient is known to me from previous hospitalization complicated left diabetic foot infection with osteomyelitis of the left hallux. Her surgical culture was positive for MRSA and group B streptococcus. I recommended IV antibiotic therapy for her, however, she was not able to get IV antibiotics at home and was not able to IV antibiotics at infusion center and wanted only to do oral antibiotics. I ordered a 3 week course of oral linezolid 406 week course of oral doxycycline. The patient comes today for a follow-up. Past Medical History:   Past medical and surgical history was reviewed by me during this visit in detail. Past Medical History:   Diagnosis Date    Diabetes mellitus out of control ([facility] [address]     Diabetes mellitus, type II ([address]     2005    Generalized headaches     Infertility     Insomnia     chronic vs lack of time spent to sleep    Migraine headache 11/9/2011    Mixed hyperlipidemia     Otitis media     h/o recurrent    Pelvic abscess in female 10/5/2013    Pneumonia     2004 approx. Past Surgical History:    Past Surgical History:   Procedure Laterality Date    CERVIX SURGERY      laser tx for dysplasia;1992       Current Medications:    All medications were reviewed by me during this visit  Current Outpatient Medications   Medication Sig Dispense Refill    insulin glargine (LANTUS) 100 UNIT/ML injection vial Inject 45 Units into the skin daily 1350 Units 3    insulin lispro (HUMALOG) 100 UNIT/ML pen Inject 0-12 Units into the skin 3 times daily (with meals) 5 pen 3    Insulin Syringe-Needle U-100 30G X 5/16\" 1 ML MISC 1 each by Does not apply route daily 100 each 3    doxycycline hyclate (VIBRA-TABS) 100 MG tablet Take 1 tablet by mouth every 12 hours 84 tablet 0    linezolid (ZYVOX) 600 MG tablet Take 1 tablet by mouth every 12 hours for 21 days 42 tablet 0    gabapentin (NEURONTIN) 100 MG capsule Take 100 mg by mouth 3 times daily. Littie Morgan Naproxen Sodium (ALEVE) 220 MG CAPS Take 220 mg by mouth as needed. Takes prn:  Possibly 2-4 once or twice per week.  lisinopril (PRINIVIL) 10 MG tablet Take 1 tablet by mouth daily. 30 tablet 3    Insulin Pen Needle (B-D UF III MINI PEN NEEDLES) 31G X 5 MM MISC by Does not apply route. 100 each 6    glucose blood VI test strips (VICTORY AGM-4000 TEST) strip Test four times daily until controlled and then three times daily. Code 250.02  ONE TOUCH ULTRA MONITOR 100 strip 12     No current facility-administered medications for this visit. Family history: All family history was reviewed by me today    Family History   Problem Relation Age of Onset    Diabetes Mother     Diabetes Father     High Blood Pressure Father     Cancer Father         colon    Diabetes Sister     Diabetes Paternal Grandmother        No family history of immunocompromising or autoimmune conditions. No h/o TB in in family or contacts    SocialHistory:  All social and epidemiologic history was reviewed and updated be me in detail today.     Social History     Socioeconomic History    Marital status:      Spouse name: Ashtyn Ayoub Number of children: 0    Years of education: None    Highest education level: None   Occupational History    Occupation: works as    Social Needs    Financial resource strain: None    Food insecurity:     Worry: None     Inability: None    Transportation needs:     Medical: None     Non-medical: None   Tobacco Use    Smoking status: Current Every Day Smoker     Packs/day: 1.00     Types: Cigarettes    Smokeless tobacco: Never Used   Substance and Sexual Activity    Alcohol use: No     Comment: Very Rare    Drug use: No    Sexual activity: Yes     Partners: Male   Lifestyle    Physical activity:     Days per week: None     Minutes per session: None    Stress: None   Relationships    Social connections:     Talks on phone: None     Gets together: None     Attends Congregational service: None     Active member of club or organization: None     Attends meetings of clubs or organizations: None     Relationship status: None    Intimate partner violence:     Fear of current or ex partner: None     Emotionally abused: None     Physically abused: None     Forced sexual activity: None   Other Topics Concern    None   Social History Narrative    None       Immunizations: All immunizations were reviewed byme in detail. Immunization History   Administered Date(s) Administered    Influenza Virus Vaccine 11/09/2011, 10/05/2013    Tdap (Boostrix, Adacel) 07/16/2018         REVIEW OF SYSTEMS:      Review of Systems   Constitutional: Negative for chills, diaphoresis and unexpected weight change. HENT: Negative for congestion, ear discharge, ear pain, facial swelling, hearing loss, rhinorrhea and trouble swallowing. Eyes: Negative for photophobia, discharge, redness and visual disturbance. Respiratory: Negative for apnea, cough, choking, chest tightness, shortness of breath and stridor. Cardiovascular: Negative for chest pain and palpitations. Gastrointestinal: Negative for abdominal pain, blood in stool, diarrhea and nausea. Endocrine: Negative for polydipsia, polyphagia and polyuria.    Genitourinary: Negative for difficulty urinating, dysuria, frequency, hematuria, menstrual problem and vaginal discharge. Musculoskeletal: Negative for arthralgias, joint swelling, myalgias and neck stiffness. Skin: Negative for color change and rash. Left hallux wound is healing   Allergic/Immunologic: Negative for immunocompromised state. Neurological: Negative for dizziness, seizures, speech difficulty, light-headedness and headaches. Hematological: Negative for adenopathy. Psychiatric/Behavioral: Negative for agitation, hallucinations and suicidal ideas. PHYSICAL EXAM:      Vitals:    05/09/19 1129   BP: (!) 140/82   Pulse:    SpO2:        Physical Exam   Constitutional: She is oriented to person, place, and time. She appears well-developed. No distress. HENT:   Head: Normocephalic. Right Ear: External ear normal.   Left Ear: External ear normal.   Nose: Nose normal.   Mouth/Throat: Oropharynx is clear and moist.   Eyes: Pupils are equal, round, and reactive to light. Conjunctivae are normal. Right eye exhibits no discharge. Left eye exhibits no discharge. No scleral icterus. Neck: Normal range of motion. Neck supple. Cardiovascular: Normal rate and regular rhythm. Exam reveals no friction rub. No murmur heard. Pulmonary/Chest: Effort normal. No stridor. She has no wheezes. She has no rales. She exhibits no tenderness. Abdominal: Soft. She exhibits no mass. There is no tenderness. There is no rebound and no guarding. Musculoskeletal: She exhibits edema (1+ pitting edema on both ankles). She exhibits no tenderness. Lymphadenopathy:     She has no cervical adenopathy. Neurological: She is alert and oriented to person, place, and time. She exhibits normal muscle tone. Skin: Skin is warm and dry. No rash noted. She is not diaphoretic. No erythema. The wound on the medial side of the left hallux is healing well. Minimal drainage. Psychiatric: She has a normal mood and affect. Judgment normal.   Nursing note and vitals reviewed.            DATA:  All regularly and to stay compliant with the diabetes medications. Patient was advised to the trim the toe nails carefully, wear shoes or slippers at all times and check both feet everyday before going to bed to look for any cuts, blisters, swelling or redness. Importance of washing the feet everyday with soap and water and keeping them dry, and seeking prompt medical attention in case of a non-healing wound or ulcer on the feet was also highlighted. Weight loss counseling:    Extensive weight loss counseling was done. It is important to set a realistic weight loss goal. First goal should be to avoid gaining more weight and staying at current weight (or within 5 percent). People at high risk of developing diabetes who are able to lose 5 percent of their body weight and maintain this weight will reduce their risk of developing diabetes by about 50 percent and reduce their blood pressure. Losing more than 15 percent of  body weight and staying at this weight is an extremely good result, even if you never reach your \"dream\" or \"ideal\" weight. Lifestyle changes including changing eating habits, substituting excess carbohydrates with proteins, stress reduction, using self-help programs like Weight Watchers®, Overeaters Anonymous®, and Take Off Pounds Sensibly (TOPS)© , following DASH diet and increasing exercise or walking briskly daily for half hour to and hour 5-7 days a week was suggested among other measures. Information was given about various weight loss education programs and their websites like www.cdc.gov/healthyweight, www.choosemyplate.gov and www.health.gov/dietaryguidelines/    Doxycyline/minocycline related instructions: Take Doxycyline/minocycline with a full glass of water and stay upright or sitting up after taking it for 30 minutes as it can cause food pipe irritation (pill esophagitis).  Use sunscreen when going in bright sun while on Doxycycline/minocycline as it can cause

## 2019-06-11 ENCOUNTER — HOSPITAL ENCOUNTER (OUTPATIENT)
Dept: NON INVASIVE DIAGNOSTICS | Age: 44
Discharge: HOME | End: 2019-06-11

## 2019-06-11 DIAGNOSIS — L08.9 TYPE 2 DIABETES MELLITUS WITH LEFT DIABETIC FOOT INFECTION (HCC): ICD-10-CM

## 2019-06-11 DIAGNOSIS — E11.628 TYPE 2 DIABETES MELLITUS WITH LEFT DIABETIC FOOT INFECTION (HCC): ICD-10-CM

## 2019-06-11 LAB
LEFT VENTRICULAR EJECTION FRACTION HIGH VALUE: 55 %
LEFT VENTRICULAR EJECTION FRACTION MODE: NORMAL
LV EF: 55 %
LVEF MODALITY: NORMAL

## 2019-06-11 PROCEDURE — 93306 TTE W/DOPPLER COMPLETE: CPT

## 2020-04-25 ENCOUNTER — APPOINTMENT (OUTPATIENT)
Dept: GENERAL RADIOLOGY | Age: 45
DRG: 468 | End: 2020-04-25
Payer: COMMERCIAL

## 2020-04-25 ENCOUNTER — HOSPITAL ENCOUNTER (INPATIENT)
Age: 45
LOS: 4 days | Discharge: HOME | DRG: 468 | End: 2020-04-29
Attending: EMERGENCY MEDICINE | Admitting: INTERNAL MEDICINE
Payer: COMMERCIAL

## 2020-04-25 ENCOUNTER — APPOINTMENT (OUTPATIENT)
Dept: CT IMAGING | Age: 45
DRG: 468 | End: 2020-04-25
Payer: COMMERCIAL

## 2020-04-25 PROBLEM — J81.1 PULMONARY EDEMA: Status: ACTIVE | Noted: 2020-04-25

## 2020-04-25 PROBLEM — R60.0 PERIPHERAL EDEMA: Status: ACTIVE | Noted: 2020-04-25

## 2020-04-25 PROBLEM — R60.9 PERIPHERAL EDEMA: Status: ACTIVE | Noted: 2020-04-25

## 2020-04-25 PROBLEM — N04.9 NEPHROTIC SYNDROME: Status: ACTIVE | Noted: 2020-04-25

## 2020-04-25 LAB
A/G RATIO: 1.1 (ref 1.1–2.2)
ALBUMIN SERPL-MCNC: 2.4 G/DL (ref 3.4–5)
ALP BLD-CCNC: 122 U/L (ref 40–129)
ALT SERPL-CCNC: 25 U/L (ref 10–40)
ANION GAP SERPL CALCULATED.3IONS-SCNC: 8 MMOL/L (ref 3–16)
AST SERPL-CCNC: 27 U/L (ref 15–37)
BASOPHILS ABSOLUTE: 0.1 K/UL (ref 0–0.2)
BASOPHILS RELATIVE PERCENT: 0.6 %
BILIRUB SERPL-MCNC: <0.2 MG/DL (ref 0–1)
BILIRUBIN URINE: NEGATIVE
BILIRUBIN URINE: NEGATIVE
BLOOD, URINE: ABNORMAL
BLOOD, URINE: ABNORMAL
BUN BLDV-MCNC: 16 MG/DL (ref 7–20)
CALCIUM SERPL-MCNC: 8.2 MG/DL (ref 8.3–10.6)
CHLORIDE BLD-SCNC: 103 MMOL/L (ref 99–110)
CLARITY: ABNORMAL
CLARITY: ABNORMAL
CO2: 25 MMOL/L (ref 21–32)
COLOR: YELLOW
COLOR: YELLOW
COMMENT UA: ABNORMAL
CREAT SERPL-MCNC: 1.1 MG/DL (ref 0.6–1.1)
CREATININE URINE: 51.5 MG/DL (ref 28–259)
EKG ATRIAL RATE: 101 BPM
EKG DIAGNOSIS: NORMAL
EKG P AXIS: 30 DEGREES
EKG P-R INTERVAL: 128 MS
EKG Q-T INTERVAL: 352 MS
EKG QRS DURATION: 72 MS
EKG QTC CALCULATION (BAZETT): 456 MS
EKG R AXIS: 44 DEGREES
EKG T AXIS: 159 DEGREES
EKG VENTRICULAR RATE: 101 BPM
EOSINOPHIL,URINE: NORMAL
EOSINOPHILS ABSOLUTE: 0.6 K/UL (ref 0–0.6)
EOSINOPHILS RELATIVE PERCENT: 3.3 %
EPITHELIAL CELLS, UA: 2 /HPF (ref 0–5)
EPITHELIAL CELLS, UA: 5 /HPF (ref 0–5)
ESTIMATED AVERAGE GLUCOSE: 289.1 MG/DL
GFR AFRICAN AMERICAN: >60
GFR NON-AFRICAN AMERICAN: 54
GLOBULIN: 2.2 G/DL
GLUCOSE BLD-MCNC: 104 MG/DL (ref 70–99)
GLUCOSE BLD-MCNC: 111 MG/DL (ref 70–99)
GLUCOSE BLD-MCNC: 176 MG/DL (ref 70–99)
GLUCOSE BLD-MCNC: 199 MG/DL (ref 70–99)
GLUCOSE BLD-MCNC: 201 MG/DL (ref 70–99)
GLUCOSE URINE: 250 MG/DL
GLUCOSE URINE: 500 MG/DL
HBA1C MFR BLD: 11.7 %
HCT VFR BLD CALC: 36.2 % (ref 36–48)
HEMOGLOBIN: 12 G/DL (ref 12–16)
HYALINE CASTS: 13 /LPF (ref 0–8)
HYALINE CASTS: 3 /LPF (ref 0–8)
KETONES, URINE: NEGATIVE MG/DL
KETONES, URINE: NEGATIVE MG/DL
LEUKOCYTE ESTERASE, URINE: NEGATIVE
LEUKOCYTE ESTERASE, URINE: NEGATIVE
LIPASE: 14 U/L (ref 13–60)
LYMPHOCYTES ABSOLUTE: 4.2 K/UL (ref 1–5.1)
LYMPHOCYTES RELATIVE PERCENT: 23.6 %
MCH RBC QN AUTO: 28.5 PG (ref 26–34)
MCHC RBC AUTO-ENTMCNC: 33.2 G/DL (ref 31–36)
MCV RBC AUTO: 85.8 FL (ref 80–100)
MICROSCOPIC EXAMINATION: YES
MICROSCOPIC EXAMINATION: YES
MONOCYTES ABSOLUTE: 1.3 K/UL (ref 0–1.3)
MONOCYTES RELATIVE PERCENT: 7.1 %
NEUTROPHILS ABSOLUTE: 11.7 K/UL (ref 1.7–7.7)
NEUTROPHILS RELATIVE PERCENT: 65.4 %
NITRITE, URINE: NEGATIVE
NITRITE, URINE: NEGATIVE
PDW BLD-RTO: 15 % (ref 12.4–15.4)
PERFORMED ON: ABNORMAL
PH UA: 6 (ref 5–8)
PH UA: 6.5 (ref 5–8)
PLATELET # BLD: 364 K/UL (ref 135–450)
PMV BLD AUTO: 8.6 FL (ref 5–10.5)
POTASSIUM REFLEX MAGNESIUM: 4.3 MMOL/L (ref 3.5–5.1)
PRO-BNP: 3553 PG/ML (ref 0–124)
PROTEIN PROTEIN: 1089 MG/DL
PROTEIN UA: >300 MG/DL
PROTEIN UA: >=300 MG/DL
RBC # BLD: 4.22 M/UL (ref 4–5.2)
RBC UA: 13 /HPF (ref 0–4)
RBC UA: 6 /HPF (ref 0–4)
SODIUM BLD-SCNC: 136 MMOL/L (ref 136–145)
SPECIFIC GRAVITY UA: 1.02 (ref 1–1.03)
SPECIFIC GRAVITY UA: 1.02 (ref 1–1.03)
TOTAL PROTEIN: 4.6 G/DL (ref 6.4–8.2)
TROPONIN: 0.04 NG/ML
URINE REFLEX TO CULTURE: ABNORMAL
URINE TYPE: ABNORMAL
URINE TYPE: ABNORMAL
UROBILINOGEN, URINE: 0.2 E.U./DL
UROBILINOGEN, URINE: 0.2 E.U./DL
WBC # BLD: 17.8 K/UL (ref 4–11)
WBC UA: 12 /HPF (ref 0–5)
WBC UA: 5 /HPF (ref 0–5)

## 2020-04-25 PROCEDURE — 71045 X-RAY EXAM CHEST 1 VIEW: CPT

## 2020-04-25 PROCEDURE — 82570 ASSAY OF URINE CREATININE: CPT

## 2020-04-25 PROCEDURE — 6370000000 HC RX 637 (ALT 250 FOR IP): Performed by: PHYSICIAN ASSISTANT

## 2020-04-25 PROCEDURE — 83690 ASSAY OF LIPASE: CPT

## 2020-04-25 PROCEDURE — 6370000000 HC RX 637 (ALT 250 FOR IP): Performed by: INTERNAL MEDICINE

## 2020-04-25 PROCEDURE — 86160 COMPLEMENT ANTIGEN: CPT

## 2020-04-25 PROCEDURE — 84156 ASSAY OF PROTEIN URINE: CPT

## 2020-04-25 PROCEDURE — 93005 ELECTROCARDIOGRAM TRACING: CPT | Performed by: PHYSICIAN ASSISTANT

## 2020-04-25 PROCEDURE — 6360000002 HC RX W HCPCS: Performed by: INTERNAL MEDICINE

## 2020-04-25 PROCEDURE — 87205 SMEAR GRAM STAIN: CPT

## 2020-04-25 PROCEDURE — 83036 HEMOGLOBIN GLYCOSYLATED A1C: CPT

## 2020-04-25 PROCEDURE — 2580000003 HC RX 258: Performed by: INTERNAL MEDICINE

## 2020-04-25 PROCEDURE — 84484 ASSAY OF TROPONIN QUANT: CPT

## 2020-04-25 PROCEDURE — 6360000004 HC RX CONTRAST MEDICATION: Performed by: PHYSICIAN ASSISTANT

## 2020-04-25 PROCEDURE — 86038 ANTINUCLEAR ANTIBODIES: CPT

## 2020-04-25 PROCEDURE — 83880 ASSAY OF NATRIURETIC PEPTIDE: CPT

## 2020-04-25 PROCEDURE — 71260 CT THORAX DX C+: CPT

## 2020-04-25 PROCEDURE — 1200000000 HC SEMI PRIVATE

## 2020-04-25 PROCEDURE — 99285 EMERGENCY DEPT VISIT HI MDM: CPT

## 2020-04-25 PROCEDURE — 80053 COMPREHEN METABOLIC PANEL: CPT

## 2020-04-25 PROCEDURE — 6360000002 HC RX W HCPCS: Performed by: EMERGENCY MEDICINE

## 2020-04-25 PROCEDURE — 81001 URINALYSIS AUTO W/SCOPE: CPT

## 2020-04-25 PROCEDURE — 36415 COLL VENOUS BLD VENIPUNCTURE: CPT

## 2020-04-25 PROCEDURE — 93010 ELECTROCARDIOGRAM REPORT: CPT | Performed by: INTERNAL MEDICINE

## 2020-04-25 PROCEDURE — 6370000000 HC RX 637 (ALT 250 FOR IP): Performed by: EMERGENCY MEDICINE

## 2020-04-25 PROCEDURE — 85025 COMPLETE CBC W/AUTO DIFF WBC: CPT

## 2020-04-25 RX ORDER — PROPRANOLOL HCL 60 MG
60 CAPSULE, EXTENDED RELEASE 24HR ORAL DAILY
Status: ON HOLD | COMMUNITY
Start: 2020-04-22 | End: 2021-02-26 | Stop reason: HOSPADM

## 2020-04-25 RX ORDER — OXYCODONE HYDROCHLORIDE AND ACETAMINOPHEN 5; 325 MG/1; MG/1
2 TABLET ORAL EVERY 6 HOURS PRN
Status: DISCONTINUED | OUTPATIENT
Start: 2020-04-25 | End: 2020-04-29 | Stop reason: HOSPADM

## 2020-04-25 RX ORDER — PROMETHAZINE HYDROCHLORIDE 25 MG/1
12.5 TABLET ORAL EVERY 6 HOURS PRN
Status: DISCONTINUED | OUTPATIENT
Start: 2020-04-25 | End: 2020-04-29 | Stop reason: HOSPADM

## 2020-04-25 RX ORDER — POLYETHYLENE GLYCOL 3350 17 G/17G
17 POWDER, FOR SOLUTION ORAL DAILY PRN
Status: DISCONTINUED | OUTPATIENT
Start: 2020-04-25 | End: 2020-04-29 | Stop reason: HOSPADM

## 2020-04-25 RX ORDER — INSULIN LISPRO 100 [IU]/ML
6 INJECTION, SOLUTION INTRAVENOUS; SUBCUTANEOUS
Status: DISCONTINUED | OUTPATIENT
Start: 2020-04-25 | End: 2020-04-28

## 2020-04-25 RX ORDER — PROPRANOLOL HCL 60 MG
60 CAPSULE, EXTENDED RELEASE 24HR ORAL DAILY
Status: DISCONTINUED | OUTPATIENT
Start: 2020-04-25 | End: 2020-04-29 | Stop reason: HOSPADM

## 2020-04-25 RX ORDER — CLONIDINE HYDROCHLORIDE 0.1 MG/1
0.1 TABLET ORAL EVERY 4 HOURS PRN
Status: DISCONTINUED | OUTPATIENT
Start: 2020-04-25 | End: 2020-04-29 | Stop reason: HOSPADM

## 2020-04-25 RX ORDER — ONDANSETRON 2 MG/ML
4 INJECTION INTRAMUSCULAR; INTRAVENOUS EVERY 6 HOURS PRN
Status: DISCONTINUED | OUTPATIENT
Start: 2020-04-25 | End: 2020-04-29 | Stop reason: HOSPADM

## 2020-04-25 RX ORDER — NICOTINE POLACRILEX 4 MG
15 LOZENGE BUCCAL PRN
Status: DISCONTINUED | OUTPATIENT
Start: 2020-04-25 | End: 2020-04-29 | Stop reason: HOSPADM

## 2020-04-25 RX ORDER — INSULIN LISPRO 100 [IU]/ML
0-12 INJECTION, SOLUTION INTRAVENOUS; SUBCUTANEOUS
Status: DISCONTINUED | OUTPATIENT
Start: 2020-04-25 | End: 2020-04-28 | Stop reason: DRUGHIGH

## 2020-04-25 RX ORDER — FUROSEMIDE 10 MG/ML
40 INJECTION INTRAMUSCULAR; INTRAVENOUS DAILY
Status: DISCONTINUED | OUTPATIENT
Start: 2020-04-25 | End: 2020-04-25

## 2020-04-25 RX ORDER — INSULIN GLARGINE 100 [IU]/ML
18 INJECTION, SOLUTION SUBCUTANEOUS NIGHTLY
Status: ON HOLD | COMMUNITY
Start: 2020-04-22 | End: 2020-04-29 | Stop reason: HOSPADM

## 2020-04-25 RX ORDER — ACETAMINOPHEN 650 MG/1
650 SUPPOSITORY RECTAL EVERY 6 HOURS PRN
Status: DISCONTINUED | OUTPATIENT
Start: 2020-04-25 | End: 2020-04-29 | Stop reason: HOSPADM

## 2020-04-25 RX ORDER — OXYCODONE HYDROCHLORIDE AND ACETAMINOPHEN 5; 325 MG/1; MG/1
2 TABLET ORAL ONCE
Status: COMPLETED | OUTPATIENT
Start: 2020-04-25 | End: 2020-04-25

## 2020-04-25 RX ORDER — FUROSEMIDE 10 MG/ML
40 INJECTION INTRAMUSCULAR; INTRAVENOUS ONCE
Status: COMPLETED | OUTPATIENT
Start: 2020-04-25 | End: 2020-04-25

## 2020-04-25 RX ORDER — INSULIN LISPRO 100 [IU]/ML
0-6 INJECTION, SOLUTION INTRAVENOUS; SUBCUTANEOUS NIGHTLY
Status: DISCONTINUED | OUTPATIENT
Start: 2020-04-25 | End: 2020-04-28 | Stop reason: DRUGHIGH

## 2020-04-25 RX ORDER — DEXTROSE MONOHYDRATE 50 MG/ML
100 INJECTION, SOLUTION INTRAVENOUS PRN
Status: DISCONTINUED | OUTPATIENT
Start: 2020-04-25 | End: 2020-04-29 | Stop reason: HOSPADM

## 2020-04-25 RX ORDER — ACETAMINOPHEN 325 MG/1
650 TABLET ORAL EVERY 6 HOURS PRN
Status: DISCONTINUED | OUTPATIENT
Start: 2020-04-25 | End: 2020-04-29 | Stop reason: HOSPADM

## 2020-04-25 RX ORDER — DEXTROSE MONOHYDRATE 25 G/50ML
12.5 INJECTION, SOLUTION INTRAVENOUS PRN
Status: DISCONTINUED | OUTPATIENT
Start: 2020-04-25 | End: 2020-04-29 | Stop reason: HOSPADM

## 2020-04-25 RX ORDER — SODIUM CHLORIDE 0.9 % (FLUSH) 0.9 %
10 SYRINGE (ML) INJECTION EVERY 12 HOURS SCHEDULED
Status: DISCONTINUED | OUTPATIENT
Start: 2020-04-25 | End: 2020-04-29 | Stop reason: HOSPADM

## 2020-04-25 RX ORDER — LISINOPRIL 20 MG/1
20 TABLET ORAL DAILY
Status: DISCONTINUED | OUTPATIENT
Start: 2020-04-25 | End: 2020-04-27

## 2020-04-25 RX ORDER — SODIUM CHLORIDE 0.9 % (FLUSH) 0.9 %
10 SYRINGE (ML) INJECTION PRN
Status: DISCONTINUED | OUTPATIENT
Start: 2020-04-25 | End: 2020-04-29 | Stop reason: HOSPADM

## 2020-04-25 RX ORDER — FUROSEMIDE 10 MG/ML
20 INJECTION INTRAMUSCULAR; INTRAVENOUS 4 TIMES DAILY
Status: COMPLETED | OUTPATIENT
Start: 2020-04-25 | End: 2020-04-26

## 2020-04-25 RX ADMIN — INSULIN LISPRO 6 UNITS: 100 INJECTION, SOLUTION INTRAVENOUS; SUBCUTANEOUS at 14:06

## 2020-04-25 RX ADMIN — ENOXAPARIN SODIUM 40 MG: 40 INJECTION SUBCUTANEOUS at 09:51

## 2020-04-25 RX ADMIN — Medication 10 ML: at 20:10

## 2020-04-25 RX ADMIN — IOPAMIDOL 75 ML: 755 INJECTION, SOLUTION INTRAVENOUS at 01:44

## 2020-04-25 RX ADMIN — INSULIN GLARGINE 18 UNITS: 100 INJECTION, SOLUTION SUBCUTANEOUS at 22:18

## 2020-04-25 RX ADMIN — OXYCODONE HYDROCHLORIDE AND ACETAMINOPHEN 2 TABLET: 5; 325 TABLET ORAL at 14:08

## 2020-04-25 RX ADMIN — INSULIN LISPRO 6 UNITS: 100 INJECTION, SOLUTION INTRAVENOUS; SUBCUTANEOUS at 09:50

## 2020-04-25 RX ADMIN — FUROSEMIDE 40 MG: 10 INJECTION, SOLUTION INTRAMUSCULAR; INTRAVENOUS at 03:31

## 2020-04-25 RX ADMIN — OXYCODONE HYDROCHLORIDE AND ACETAMINOPHEN 2 TABLET: 5; 325 TABLET ORAL at 07:17

## 2020-04-25 RX ADMIN — INSULIN LISPRO 6 UNITS: 100 INJECTION, SOLUTION INTRAVENOUS; SUBCUTANEOUS at 18:15

## 2020-04-25 RX ADMIN — LISINOPRIL 20 MG: 20 TABLET ORAL at 09:52

## 2020-04-25 RX ADMIN — PROPRANOLOL HYDROCHLORIDE 60 MG: 60 CAPSULE, EXTENDED RELEASE ORAL at 09:52

## 2020-04-25 RX ADMIN — OXYCODONE HYDROCHLORIDE AND ACETAMINOPHEN 2 TABLET: 5; 325 TABLET ORAL at 02:42

## 2020-04-25 RX ADMIN — FUROSEMIDE 40 MG: 10 INJECTION, SOLUTION INTRAMUSCULAR; INTRAVENOUS at 09:52

## 2020-04-25 RX ADMIN — OXYCODONE HYDROCHLORIDE AND ACETAMINOPHEN 2 TABLET: 5; 325 TABLET ORAL at 20:10

## 2020-04-25 RX ADMIN — FUROSEMIDE 20 MG: 10 INJECTION, SOLUTION INTRAMUSCULAR; INTRAVENOUS at 20:10

## 2020-04-25 RX ADMIN — NITROGLYCERIN 1 INCH: 20 OINTMENT TOPICAL at 03:57

## 2020-04-25 RX ADMIN — INSULIN LISPRO 2 UNITS: 100 INJECTION, SOLUTION INTRAVENOUS; SUBCUTANEOUS at 09:49

## 2020-04-25 RX ADMIN — INSULIN LISPRO 2 UNITS: 100 INJECTION, SOLUTION INTRAVENOUS; SUBCUTANEOUS at 14:06

## 2020-04-25 RX ADMIN — Medication 10 ML: at 09:53

## 2020-04-25 ASSESSMENT — ENCOUNTER SYMPTOMS
NAUSEA: 0
ABDOMINAL PAIN: 0
SHORTNESS OF BREATH: 0
VOMITING: 0

## 2020-04-25 ASSESSMENT — PAIN DESCRIPTION - LOCATION
LOCATION: BACK
LOCATION: BACK
LOCATION: BACK;OTHER (COMMENT)
LOCATION: BACK
LOCATION: BACK

## 2020-04-25 ASSESSMENT — PAIN DESCRIPTION - DIRECTION
RADIATING_TOWARDS: L SHOULDER
RADIATING_TOWARDS: LEFT SHOULDER
RADIATING_TOWARDS: LEFT SHOULDER

## 2020-04-25 ASSESSMENT — PAIN SCALES - GENERAL
PAINLEVEL_OUTOF10: 5
PAINLEVEL_OUTOF10: 3
PAINLEVEL_OUTOF10: 0
PAINLEVEL_OUTOF10: 9
PAINLEVEL_OUTOF10: 0
PAINLEVEL_OUTOF10: 2
PAINLEVEL_OUTOF10: 6
PAINLEVEL_OUTOF10: 1
PAINLEVEL_OUTOF10: 2
PAINLEVEL_OUTOF10: 5
PAINLEVEL_OUTOF10: 7

## 2020-04-25 ASSESSMENT — PAIN DESCRIPTION - ORIENTATION
ORIENTATION: MID

## 2020-04-25 ASSESSMENT — PAIN DESCRIPTION - FREQUENCY: FREQUENCY: INTERMITTENT

## 2020-04-25 ASSESSMENT — PAIN DESCRIPTION - DESCRIPTORS
DESCRIPTORS: SQUEEZING

## 2020-04-25 ASSESSMENT — PAIN DESCRIPTION - PAIN TYPE
TYPE: ACUTE PAIN

## 2020-04-25 ASSESSMENT — PAIN DESCRIPTION - ONSET: ONSET: GRADUAL

## 2020-04-25 ASSESSMENT — PAIN DESCRIPTION - PROGRESSION: CLINICAL_PROGRESSION: GRADUALLY WORSENING

## 2020-04-25 NOTE — ED PROVIDER NOTES
for her at all. She has no evidence of a bacterial infectious process currently in the urine or anywhere else. Therefore the significance of this is currently indeterminate. Considering her troponin elevation and BNP elevation, despite lack of cardiopulmonary symptoms acutely, we did a CT chest and this was notable for pulmonary edema, pleural and pericardial effusion. The patient will be given IV Lasix and admitted. At this time I do not suspect acute coronary syndrome based on her paucity of cardiopulmonary symptoms. Of note, the patient developed hypoxia. She did start to feel little short of breath which was not initially present complaints. This happened just prior to leaving the ED after she was admitted. She was placed on supplemental oxygen. She has no evidence of an opioid toxidrome after 1 dose of Percocet and her blood pressure went up to the 200s and therefore she was given nitroglycerin ointment and placed on 2 L of supplemental oxygen and will be monitored closely. Hospitalist will be updated. During the patient's ED course, the patient was given:  Medications   nitroglycerin (NITRO-BID) 2 % ointment 1 inch (has no administration in time range)   iopamidol (ISOVUE-370) 76 % injection 75 mL (75 mLs Intravenous Given 4/25/20 0144)   oxyCODONE-acetaminophen (PERCOCET) 5-325 MG per tablet 2 tablet (2 tablets Oral Given 4/25/20 0242)   furosemide (LASIX) injection 40 mg (40 mg Intravenous Given 4/25/20 0331)        CLINICAL IMPRESSION  1. Hypoproteinemia ([address]    2. Proteinuria, unspecified type    3. Peripheral edema    4. Acute pulmonary edema (HCC)    5. Essential hypertension    6. Pericardial effusion    7. Hypoxia        DISPOSITION  Liz Brizuela was admitted in fair condition. The plan is to admit to the hospital at this time under the hospitalist service. Dr. Liam Valencia accepted the patient and will take over the patient's care.     The total critical care time spent while evaluating and treating this patient was at least 32 minutes. This excludes time spent doing separately billable procedures. This includes time at the bedside, data interpretation, medication management, obtaining critical history from collateral sources if the patient is unable to provide it directly, and physician consultation. Specifics of interventions taken and potentially life-threatening diagnostic considerations are listed above in the medical decision making. This chart was created using Dragon dictation software. Efforts were made by me to ensure accuracy, however some errors may be present due to limitations of this technology.             Junior Andrews MD  04/25/20 0300       Junior Andrews MD  04/25/20 [id number] [id number] [place]vidio  04/25/20 5658

## 2020-04-25 NOTE — ED PROVIDER NOTES
creatinine are within normal range. Patient states she has no chest pain. Patient has new t wave inversions in I and AVL, new from 2013. Liver function testing is within normal range. Patient's white blood cell count is elevated however this has decreased since being evaluated in the emergency department for a separate issue 3 days ago. CT of the patient's chest is negative for pulmonary embolism. Patient does have evidence of pulmonary edema and effusion as well as a small pericardial effusion. Patient will be admitted for further management of her possible nephrotic syndrome and was started with 40 of IV Lasix here in the emergency department. There was a high probability of life-threatening clinical deterioration in the patient's condition requiring my urgent intervention. The total critical care time spent while evaluating and treating this patient was at least 30 minutes. This excludes time spent doing separately billable procedures. This includes time at the bedside, data interpretation, medication management, obtaining critical history from collateral sources if the patient is unable to provide it directly, and physician consultation. Specifics of interventions taken and potentially life-threatening diagnostic considerations are listed in the medical decision making. Nursing staff alerted Dr. Ric Graham and I to decrease oxygen saturation level with good pleth. Patient was laying on her side in the room oxygenating at 79% on room air. Patient was repositioned to a sitting position which she states felt better she could take a deeper breath in this position than lying down. Patient was given 2 L nasal cannula and oxygen saturation rebounded to 94% within 1 minute. Patient is given 1 inch of Nitropaste. Hospitalist will be updated prior to the patient going upstairs. FINAL IMPRESSION      1. Hypoproteinemia ([address]    2. Proteinuria, unspecified type    3. Peripheral edema    4.  Acute pulmonary

## 2020-04-25 NOTE — H&P
Hospital Medicine History & Physical      PCP: No primary care provider on file. Date of Admission: 4/25/2020    Date of Service: Pt seen/examined on 04/25/20 and Admitted to Inpatient with expected LOS greater than two midnights due to medical therapy. Chief Complaint:  Generalized swelling       History Of Present Illness:  Lizzette Valles is 40 y.o. female who presented with complaint of generalized swelling. Symptom onset was gradual for a time period of 2 month. The severity is described as severe. The course of his symptoms over time is worsening. The symptoms improved with none and worsened with none. The patient's symptom is associated with . Lizzette Valles is 40 y.o. female with history of DM II, uncontrolled and HTN  She presents to the ER with complaint of generalized swelling  It started with the legs only in Feb 2020 and now has progressed to include the thigh, abdomen and face  She denies SOB or chest pain  However, in the ED she required oxygen by NC 2 Lbecause she became hypoxic   CT chest shows pleural effusion and pulmonary edema         Past Medical History:          Diagnosis Date    Diabetes mellitus out of control ([address]     Diabetes mellitus, type II ([address]     2005    Generalized headaches     Infertility     Insomnia     chronic vs lack of time spent to sleep    Migraine headache 11/9/2011    Mixed hyperlipidemia     Otitis media     h/o recurrent    Pelvic abscess in female 10/5/2013    Pneumonia     2004 approx. Past Surgical History:          Procedure Laterality Date    CERVIX SURGERY      laser tx for dysplasia;1992       Medications Prior to Admission:      Prior to Admission medications    Medication Sig Start Date End Date Taking?  Authorizing Provider   insulin glargine (LANTUS) 100 UNIT/ML injection vial Inject 18 Units into the skin nightly 4/22/20  Yes Historical Provider, MD   insulin lispro (HUMALOG) 100 UNIT/ML injection vial Inject 6 Units into the skin 3 times daily (before meals) 4/22/20 4/27/20 Yes Historical Provider, MD   propranolol (INDERAL LA) 60 MG extended release capsule Take 60 mg by mouth daily 4/22/20  Yes Historical Provider, MD   insulin glargine (LANTUS) 100 UNIT/ML injection vial Inject 45 Units into the skin daily 4/25/19 5/25/19  Hayde Felix MD   insulin lispro (HUMALOG) 100 UNIT/ML pen Inject 0-12 Units into the skin 3 times daily (with meals) 4/25/19   Hayde Felix MD   Insulin Syringe-Needle U-100 30G X 5/16\" 1 ML MISC 1 each by Does not apply route daily 4/25/19   Hayde Felix MD   gabapentin (NEURONTIN) 100 MG capsule Take 100 mg by mouth 3 times daily. Vernestine Ket Historical Provider, MD   Naproxen Sodium (ALEVE) 220 MG CAPS Take 220 mg by mouth as needed. Takes prn:  Possibly 2-4 once or twice per week. Historical Provider, MD   lisinopril (PRINIVIL) 10 MG tablet Take 1 tablet by mouth daily. 11/27/13   Liliana Beyer MD   glucose blood VI test strips (VICTORY AGM-4000 TEST) strip Test four times daily until controlled and then three times daily. Code 250.02  ONE TOUCH ULTRA MONITOR 10/15/13 1/7/19  Liliana Beyer MD   Insulin Pen Needle (B-D UF III MINI PEN NEEDLES) 31G X 5 MM MISC by Does not apply route. 11/9/11   Liliana Beyer MD       Allergies:  Levofloxacin; Vancomycin; Amoxicillin; and Tape [adhesive tape]    Social History:      The patient currently lives at home     TOBACCO:   reports that she has been smoking cigarettes. She has been smoking about 1.00 pack per day. She has never used smokeless tobacco.  ETOH:   reports no history of alcohol use. E-Cigarettes Vaping or Juuling     Questions Responses    Vaping Use Never User    Start Date     Does device contain nicotine? Quit Date     Vaping Type             Family History:      Reviewed in detail and negative for CAD, CVA.  Positive as follows:        Problem Relation Age of Onset    Diabetes Mother     Diabetes Father     High

## 2020-04-25 NOTE — CONSULTS
suppository 650 mg, 650 mg, Rectal, Q6H PRN  polyethylene glycol (GLYCOLAX) packet 17 g, 17 g, Oral, Daily PRN  promethazine (PHENERGAN) tablet 12.5 mg, 12.5 mg, Oral, Q6H PRN **OR** ondansetron (ZOFRAN) injection 4 mg, 4 mg, Intravenous, Q6H PRN  enoxaparin (LOVENOX) injection 40 mg, 40 mg, Subcutaneous, Daily  furosemide (LASIX) injection 40 mg, 40 mg, Intravenous, Daily  oxyCODONE-acetaminophen (PERCOCET) 5-325 MG per tablet 2 tablet, 2 tablet, Oral, Q6H PRN  propranolol (INDERAL LA) extended release capsule 60 mg, 60 mg, Oral, Daily  insulin glargine (LANTUS;BASAGLAR) injection pen 18 Units, 18 Units, Subcutaneous, Nightly  insulin lispro (1 Unit Dial) 6 Units, 6 Units, Subcutaneous, TID AC  glucose (GLUTOSE) 40 % oral gel 15 g, 15 g, Oral, PRN  dextrose 50 % IV solution, 12.5 g, Intravenous, PRN  glucagon (rDNA) injection 1 mg, 1 mg, Intramuscular, PRN  dextrose 5 % solution, 100 mL/hr, Intravenous, PRN  insulin lispro (1 Unit Dial) 0-12 Units, 0-12 Units, Subcutaneous, TID WC  insulin lispro (1 Unit Dial) 0-6 Units, 0-6 Units, Subcutaneous, Nightly  perflutren lipid microspheres (DEFINITY) injection 1.65 mg, 1.5 mL, Intravenous, ONCE PRN  lisinopril (PRINIVIL;ZESTRIL) tablet 20 mg, 20 mg, Oral, Daily  cloNIDine (CATAPRES) tablet 0.1 mg, 0.1 mg, Oral, Q4H PRN   dextrose           Allergies. Allergies   Allergen Reactions    Levofloxacin Anaphylaxis    Vancomycin Shortness Of Breath    Amoxicillin Itching     Tolerates cephalosporins    Tape [Adhesive Tape] Other (See Comments)     Plastic tape turns skin bright red. Paper tape okay. Social History.   Social History     Socioeconomic History    Marital status:      Spouse name: [place] Patches Number of children: 0    Years of education: Not on file    Highest education level: Not on file   Occupational History    Occupation: works as    Social Needs    Financial resource strain: Not on file    Food insecurity     Worry: Not on Temp 97.3 °F (36.3 °C) (Axillary)   Resp 17   Ht 5' 6\" (1.676 m)   Wt 229 lb 4.5 oz (104 kg)   LMP 04/01/2020   SpO2 95%   BMI 37.01 kg/m²     Intake/Output Summary (Last 24 hours) at 4/25/2020 1833  Last data filed at 4/25/2020 1334  Gross per 24 hour   Intake 480 ml   Output 1550 ml   Net -1070 ml       INTAKE/OUTPUT:  I/O last 3 completed shifts: In: 480 [P.O.:480]  Out: 1550 [Urine:1550]  No intake/output data recorded. Ill Appearing. mild respiratory distress. Awake alert   uncontrolled hypertension  External exam of the ears and nose are normal  HENT: exam is normal  Eyes: Pupils are equal, round, and reactive to light. Lymph Nodes. No axillary or cervical lymph nodes are palpable. Neck. JVD not visible. No lymph nodes palpable. CVS.  Heart sounds are normal.Palpation of the heart is normal. No murmurs. No pericardial rub.  RS.dullness on percussion of the lower chest wall. Bilateral Basal rales. PA soft , bowel sounds are normal no distension and no tenderness to palpation. Skin No rash , No palpable nodules  Musculoskeletal: Normal range of motion. 1+ edema and no tenderness. CNS  No focal    Edema Worse. Awake and alert   All pulses are well felt      Labs reviewed by me       CBC:   Recent Labs     04/25/20  0053   WBC 17.8*   HGB 12.0   HCT 36.2   MCV 85.8        BMP:   Recent Labs     04/25/20  0053      K 4.3      CO2 25   BUN 16   CREATININE 1.1     Magnesium: No results found for: MG                 ASSESSMENT           Nephrotic syndrome with heavy proteinuria. Generalized anasarca and fluid overload. Pleural effusion. Small pericardial effusion on CT scan. Poorly controlled type 2 diabetes with diabetic retinopathy. PLAN         Patient is presented with generalized edema and anasarca. She has nephrotic syndrome with heavy proteinuria of approximately 10 g.     Patient has a 20 year history of poorly controlled type 2 diabetes with diabetic retinopathy and visual impairment. This most likely represents diabetic nephropathy. Her creatinine is 1.0 and albumin is 2.4. She has massive edema and anasarca together with pleural effusion. Continue IV Lasix 40 mg every 8 hours. Renal functions and electrolytes will need to be closely monitored. Patient will need to observe a 800 Milliliters fluid restriction. Must be on low sodium diet. Uncontrolled hypertension most likely related to gross anasarca. Continue lisinopril 20 mg a day and titrate upwards    Begin clonidine 0.1 mg twice daily for blood pressure control. We will need a 24-hour urine to exclude other causes of proteinuria,. Exclude any abnormal proteinuria. UPEP and SPEP. Renal ultrasound scan on Monday. At this time a diagnostic kidney biopsy is not indicated. However this may have to be considered, if initial workup is abnormal.    Continue inpatient hospital stay. Work up for acute renal failure has been ordered which include:  Renal Panel study  CBC  Urine Analysis   Urine Electrolytes   U Protein : creatinine ratio  Urine for eosinophil smear exam.    Renal Ultrasound Scan     Daily weight   Strict I and O   Renal Diet   Low Na diet       Thanks for the consult             Electronically Signed:  Adi Valle MD 4/25/2020          Arterial Blood Gasses  No results for input(s): PH, PCO2, PO2 in the last 72 hours.     Invalid input(s): [id number], INSPIREDO2    UA:  Recent Labs     04/25/20  1001   COLORU YELLOW   PHUR 6.0   WBCUA 12*   RBCUA 6*   CLARITYU CLOUDY*   SPECGRAV 1.022   LEUKOCYTESUR Negative   UROBILINOGEN 0.2   BILIRUBINUR Negative   BLOODU MODERATE*   GLUCOSEU 250*       LIVER PROFILE:   Recent Labs     04/25/20  0053   AST 27   ALT 25   LIPASE 14.0   BILITOT <0.2   ALKPHOS 122     PT/INR:    Lab Results   Component Value Date    PROTIME 14.1 10/04/2013    INR 1.27 10/04/2013     PTT:    Lab Results   Component Value Date APTT 27.8 10/04/2013     NILSA:  No results found for: ANATITER, NILSA        RADIOLOGY:    Xr Chest Portable    Result Date: 4/25/2020  EXAMINATION: ONE XRAY VIEW OF THE CHEST 4/24/2020 9:47 pm COMPARISON: None. HISTORY: ORDERING SYSTEM PROVIDED HISTORY: swelling TECHNOLOGIST PROVIDED HISTORY: Reason for exam:->swelling Reason for Exam: Other (Pt c/o of all over body swelling states that she started with leg swelling when standing too long in feb and it has progressed to now she has swelling throughout body pt states she is unaware of why this is happening or what happened. Denies sob, fever, cp. pt has rash to the right arm that has been there since aug) Acuity: Unknown Type of Exam: Unknown FINDINGS: Cardial pericardial silhouette is at the upper limits of normal in size. Minimal discoid atelectasis within the right midlung noted. No focal infiltrate is identified. No pneumothorax is seen. No free air. No acute bony abnormality. No acute abnormality identified. Ct Chest Pulmonary Embolism W Contrast    Result Date: 4/25/2020  EXAMINATION: CTA OF THE CHEST 4/25/2020 1:44 am TECHNIQUE: CTA of the chest was performed after the administration of intravenous contrast.  Multiplanar reformatted images are provided for review. MIP images are provided for review. Dose modulation, iterative reconstruction, and/or weight based adjustment of the mA/kV was utilized to reduce the radiation dose to as low as reasonably achievable. COMPARISON: None. HISTORY: ORDERING SYSTEM PROVIDED HISTORY: anasarca, abnormal labs TECHNOLOGIST PROVIDED HISTORY: Reason for exam:->anasarca, abnormal labs Reason for Exam: Other (Pt c/o of all over body swelling states that she started with leg swelling when standing too long in feb and it has progressed to now she has swelling throughout body pt states she is unaware of why this is happening or what happened.  Denies sob, fever, cp. pt has rash to the right arm that has been there since

## 2020-04-25 NOTE — ED NOTES
Pt ambulatory to BR with steady gait assisted by RN due to visual difficulties. In no acute or apparent distress.      Doe Atwood Post, RN  04/25/20 9839

## 2020-04-25 NOTE — ED NOTES
Pt asking for pain medicine for lower- to mid-back pain that has been present for 3 weeks but worse over the last week. Dr. Marmolejo Rued aware; see orders and meds given. BP remains elevated. Pt assisted to repositioning on her right side to help ease back pain. Will continue to monitor.      Brenda Gipson RN  04/25/20 5269

## 2020-04-26 LAB
ANION GAP SERPL CALCULATED.3IONS-SCNC: 8 MMOL/L (ref 3–16)
ANTI-NUCLEAR ANTIBODY (ANA): NEGATIVE
BUN BLDV-MCNC: 14 MG/DL (ref 7–20)
C3 COMPLEMENT: 141.4 MG/DL (ref 90–180)
C4 COMPLEMENT: 20.9 MG/DL (ref 10–40)
CALCIUM SERPL-MCNC: 8.3 MG/DL (ref 8.3–10.6)
CHLORIDE BLD-SCNC: 103 MMOL/L (ref 99–110)
CO2: 26 MMOL/L (ref 21–32)
CREAT SERPL-MCNC: 1.1 MG/DL (ref 0.6–1.1)
GFR AFRICAN AMERICAN: >60
GFR NON-AFRICAN AMERICAN: 54
GLUCOSE BLD-MCNC: 101 MG/DL (ref 70–99)
GLUCOSE BLD-MCNC: 106 MG/DL (ref 70–99)
GLUCOSE BLD-MCNC: 148 MG/DL (ref 70–99)
GLUCOSE BLD-MCNC: 194 MG/DL (ref 70–99)
GLUCOSE BLD-MCNC: 72 MG/DL (ref 70–99)
HCT VFR BLD CALC: 36.2 % (ref 36–48)
HEMOGLOBIN: 11.9 G/DL (ref 12–16)
MAGNESIUM: 1.9 MG/DL (ref 1.8–2.4)
MCH RBC QN AUTO: 28.8 PG (ref 26–34)
MCHC RBC AUTO-ENTMCNC: 32.8 G/DL (ref 31–36)
MCV RBC AUTO: 87.8 FL (ref 80–100)
PDW BLD-RTO: 15.1 % (ref 12.4–15.4)
PERFORMED ON: ABNORMAL
PERFORMED ON: NORMAL
PHOSPHORUS: 3.6 MG/DL (ref 2.5–4.9)
PLATELET # BLD: 361 K/UL (ref 135–450)
PMV BLD AUTO: 8.8 FL (ref 5–10.5)
POTASSIUM SERPL-SCNC: 4.1 MMOL/L (ref 3.5–5.1)
RBC # BLD: 4.12 M/UL (ref 4–5.2)
SODIUM BLD-SCNC: 137 MMOL/L (ref 136–145)
WBC # BLD: 17.4 K/UL (ref 4–11)

## 2020-04-26 PROCEDURE — 85027 COMPLETE CBC AUTOMATED: CPT

## 2020-04-26 PROCEDURE — 84100 ASSAY OF PHOSPHORUS: CPT

## 2020-04-26 PROCEDURE — 83735 ASSAY OF MAGNESIUM: CPT

## 2020-04-26 PROCEDURE — 6360000002 HC RX W HCPCS: Performed by: INTERNAL MEDICINE

## 2020-04-26 PROCEDURE — 84165 PROTEIN E-PHORESIS SERUM: CPT

## 2020-04-26 PROCEDURE — 84156 ASSAY OF PROTEIN URINE: CPT

## 2020-04-26 PROCEDURE — 1200000000 HC SEMI PRIVATE

## 2020-04-26 PROCEDURE — 86255 FLUORESCENT ANTIBODY SCREEN: CPT

## 2020-04-26 PROCEDURE — 36415 COLL VENOUS BLD VENIPUNCTURE: CPT

## 2020-04-26 PROCEDURE — 84155 ASSAY OF PROTEIN SERUM: CPT

## 2020-04-26 PROCEDURE — 2580000003 HC RX 258: Performed by: INTERNAL MEDICINE

## 2020-04-26 PROCEDURE — 6370000000 HC RX 637 (ALT 250 FOR IP): Performed by: INTERNAL MEDICINE

## 2020-04-26 PROCEDURE — 80048 BASIC METABOLIC PNL TOTAL CA: CPT

## 2020-04-26 RX ADMIN — INSULIN GLARGINE 18 UNITS: 100 INJECTION, SOLUTION SUBCUTANEOUS at 21:58

## 2020-04-26 RX ADMIN — OXYCODONE HYDROCHLORIDE AND ACETAMINOPHEN 2 TABLET: 5; 325 TABLET ORAL at 10:18

## 2020-04-26 RX ADMIN — PROPRANOLOL HYDROCHLORIDE 60 MG: 60 CAPSULE, EXTENDED RELEASE ORAL at 08:54

## 2020-04-26 RX ADMIN — OXYCODONE HYDROCHLORIDE AND ACETAMINOPHEN 2 TABLET: 5; 325 TABLET ORAL at 03:48

## 2020-04-26 RX ADMIN — FUROSEMIDE 20 MG: 10 INJECTION, SOLUTION INTRAMUSCULAR; INTRAVENOUS at 08:54

## 2020-04-26 RX ADMIN — Medication 10 ML: at 08:54

## 2020-04-26 RX ADMIN — ENOXAPARIN SODIUM 40 MG: 40 INJECTION SUBCUTANEOUS at 08:54

## 2020-04-26 RX ADMIN — LISINOPRIL 20 MG: 20 TABLET ORAL at 08:54

## 2020-04-26 RX ADMIN — INSULIN LISPRO 6 UNITS: 100 INJECTION, SOLUTION INTRAVENOUS; SUBCUTANEOUS at 13:58

## 2020-04-26 RX ADMIN — INSULIN LISPRO 2 UNITS: 100 INJECTION, SOLUTION INTRAVENOUS; SUBCUTANEOUS at 13:57

## 2020-04-26 RX ADMIN — Medication 10 ML: at 21:59

## 2020-04-26 RX ADMIN — FUROSEMIDE 20 MG: 10 INJECTION, SOLUTION INTRAMUSCULAR; INTRAVENOUS at 16:02

## 2020-04-26 RX ADMIN — OXYCODONE HYDROCHLORIDE AND ACETAMINOPHEN 2 TABLET: 5; 325 TABLET ORAL at 19:12

## 2020-04-26 ASSESSMENT — PAIN - FUNCTIONAL ASSESSMENT
PAIN_FUNCTIONAL_ASSESSMENT: ACTIVITIES ARE NOT PREVENTED
PAIN_FUNCTIONAL_ASSESSMENT: ACTIVITIES ARE NOT PREVENTED

## 2020-04-26 ASSESSMENT — PAIN DESCRIPTION - DESCRIPTORS
DESCRIPTORS: SQUEEZING
DESCRIPTORS: TIGHTNESS
DESCRIPTORS: TIGHTNESS

## 2020-04-26 ASSESSMENT — PAIN SCALES - GENERAL
PAINLEVEL_OUTOF10: 5
PAINLEVEL_OUTOF10: 7
PAINLEVEL_OUTOF10: 4
PAINLEVEL_OUTOF10: 5
PAINLEVEL_OUTOF10: 4
PAINLEVEL_OUTOF10: 3
PAINLEVEL_OUTOF10: 3
PAINLEVEL_OUTOF10: 5

## 2020-04-26 ASSESSMENT — PAIN DESCRIPTION - ONSET
ONSET: ON-GOING
ONSET: ON-GOING

## 2020-04-26 ASSESSMENT — PAIN DESCRIPTION - FREQUENCY
FREQUENCY: CONTINUOUS
FREQUENCY: CONTINUOUS

## 2020-04-26 ASSESSMENT — PAIN DESCRIPTION - PAIN TYPE
TYPE: ACUTE PAIN

## 2020-04-26 ASSESSMENT — PAIN DESCRIPTION - ORIENTATION
ORIENTATION: MID
ORIENTATION: MID
ORIENTATION: LOWER;OUTER
ORIENTATION: LOWER;OUTER

## 2020-04-26 ASSESSMENT — PAIN DESCRIPTION - LOCATION
LOCATION: BACK

## 2020-04-26 ASSESSMENT — PAIN DESCRIPTION - PROGRESSION
CLINICAL_PROGRESSION: NOT CHANGED
CLINICAL_PROGRESSION: NOT CHANGED

## 2020-04-26 ASSESSMENT — PAIN DESCRIPTION - DIRECTION: RADIATING_TOWARDS: L SHOULDER

## 2020-04-26 NOTE — PROGRESS NOTES
Assessment and med pass complete. Meds taken whole with water. Medicated for pain per request. Informed on low sodium diet, 800ml fluid restriction, and 24 hour urine collection. Got ice chips and snack per request. Ambulates independently, informed to call if needing assist. Denies other needs, call light in reach.

## 2020-04-26 NOTE — PROGRESS NOTES
[facility] PROGRESS NOTE    4/26/2020 1:59 PM        Name: Sloan Rosales . Admitted: 4/25/2020  Primary Care Provider: No primary care provider on file. (Tel: None)                        Subjective:  . No acute events overnight. Resting well. Pain control. Diet ok. Labs reviewed  Denies any chest pain sob.      Reviewed interval ancillary notes    Current Medications  sodium chloride flush 0.9 % injection 10 mL, 2 times per day  sodium chloride flush 0.9 % injection 10 mL, PRN  acetaminophen (TYLENOL) tablet 650 mg, Q6H PRN    Or  acetaminophen (TYLENOL) suppository 650 mg, Q6H PRN  polyethylene glycol (GLYCOLAX) packet 17 g, Daily PRN  promethazine (PHENERGAN) tablet 12.5 mg, Q6H PRN    Or  ondansetron (ZOFRAN) injection 4 mg, Q6H PRN  enoxaparin (LOVENOX) injection 40 mg, Daily  oxyCODONE-acetaminophen (PERCOCET) 5-325 MG per tablet 2 tablet, Q6H PRN  propranolol (INDERAL LA) extended release capsule 60 mg, Daily  insulin glargine (LANTUS;BASAGLAR) injection pen 18 Units, Nightly  insulin lispro (1 Unit Dial) 6 Units, TID AC  glucose (GLUTOSE) 40 % oral gel 15 g, PRN  dextrose 50 % IV solution, PRN  glucagon (rDNA) injection 1 mg, PRN  dextrose 5 % solution, PRN  insulin lispro (1 Unit Dial) 0-12 Units, TID WC  insulin lispro (1 Unit Dial) 0-6 Units, Nightly  perflutren lipid microspheres (DEFINITY) injection 1.65 mg, ONCE PRN  lisinopril (PRINIVIL;ZESTRIL) tablet 20 mg, Daily  cloNIDine (CATAPRES) tablet 0.1 mg, Q4H PRN  furosemide (LASIX) injection 20 mg, 4x Daily        Objective:  BP (!) 148/83   Pulse 93   Temp 97 °F (36.1 °C) (Temporal)   Resp 18   Ht 5' 6\" (1.676 m)   Wt 226 lb 10.1 oz (102.8 kg)   LMP 04/01/2020   SpO2 97%   BMI 36.58 kg/m²     Intake/Output Summary (Last 24 hours) at 4/26/2020 1359  Last data filed at 4/26/2020 1043  Gross per 24 hour

## 2020-04-27 LAB
24HR URINE VOLUME (ML): 2500 ML
ANION GAP SERPL CALCULATED.3IONS-SCNC: 9 MMOL/L (ref 3–16)
BASOPHILS ABSOLUTE: 0.1 K/UL (ref 0–0.2)
BASOPHILS RELATIVE PERCENT: 0.4 %
BUN BLDV-MCNC: 15 MG/DL (ref 7–20)
CALCIUM SERPL-MCNC: 8.1 MG/DL (ref 8.3–10.6)
CHLORIDE BLD-SCNC: 102 MMOL/L (ref 99–110)
CO2: 29 MMOL/L (ref 21–32)
CREAT SERPL-MCNC: 1 MG/DL (ref 0.6–1.1)
CREATININE 24 HOUR URINE: 1.1 G/24HR (ref 0.6–1.5)
EOSINOPHILS ABSOLUTE: 0.7 K/UL (ref 0–0.6)
EOSINOPHILS RELATIVE PERCENT: 4.6 %
GFR AFRICAN AMERICAN: >60
GFR NON-AFRICAN AMERICAN: 60
GLUCOSE BLD-MCNC: 121 MG/DL (ref 70–99)
GLUCOSE BLD-MCNC: 156 MG/DL (ref 70–99)
GLUCOSE BLD-MCNC: 81 MG/DL (ref 70–99)
GLUCOSE BLD-MCNC: 89 MG/DL (ref 70–99)
GLUCOSE BLD-MCNC: 94 MG/DL (ref 70–99)
HCT VFR BLD CALC: 36.3 % (ref 36–48)
HEMOGLOBIN: 11.8 G/DL (ref 12–16)
LYMPHOCYTES ABSOLUTE: 3.8 K/UL (ref 1–5.1)
LYMPHOCYTES RELATIVE PERCENT: 25.9 %
MAGNESIUM: 1.9 MG/DL (ref 1.8–2.4)
MCH RBC QN AUTO: 28.2 PG (ref 26–34)
MCHC RBC AUTO-ENTMCNC: 32.4 G/DL (ref 31–36)
MCV RBC AUTO: 87.1 FL (ref 80–100)
MONOCYTES ABSOLUTE: 1 K/UL (ref 0–1.3)
MONOCYTES RELATIVE PERCENT: 6.6 %
NEUTROPHILS ABSOLUTE: 9.1 K/UL (ref 1.7–7.7)
NEUTROPHILS RELATIVE PERCENT: 62.5 %
PDW BLD-RTO: 15.2 % (ref 12.4–15.4)
PERFORMED ON: ABNORMAL
PERFORMED ON: ABNORMAL
PERFORMED ON: NORMAL
PERFORMED ON: NORMAL
PLATELET # BLD: 348 K/UL (ref 135–450)
PMV BLD AUTO: 8.6 FL (ref 5–10.5)
POTASSIUM REFLEX MAGNESIUM: 3.5 MMOL/L (ref 3.5–5.1)
PROTEIN 24 HOUR URINE: 12.53 G/24HR
RBC # BLD: 4.17 M/UL (ref 4–5.2)
SODIUM BLD-SCNC: 140 MMOL/L (ref 136–145)
WBC # BLD: 14.6 K/UL (ref 4–11)

## 2020-04-27 PROCEDURE — 36415 COLL VENOUS BLD VENIPUNCTURE: CPT

## 2020-04-27 PROCEDURE — 94760 N-INVAS EAR/PLS OXIMETRY 1: CPT

## 2020-04-27 PROCEDURE — 80048 BASIC METABOLIC PNL TOTAL CA: CPT

## 2020-04-27 PROCEDURE — 6360000002 HC RX W HCPCS: Performed by: INTERNAL MEDICINE

## 2020-04-27 PROCEDURE — 84166 PROTEIN E-PHORESIS/URINE/CSF: CPT

## 2020-04-27 PROCEDURE — 6370000000 HC RX 637 (ALT 250 FOR IP): Performed by: INTERNAL MEDICINE

## 2020-04-27 PROCEDURE — 2580000003 HC RX 258: Performed by: INTERNAL MEDICINE

## 2020-04-27 PROCEDURE — 85025 COMPLETE CBC W/AUTO DIFF WBC: CPT

## 2020-04-27 PROCEDURE — 1200000000 HC SEMI PRIVATE

## 2020-04-27 PROCEDURE — 83735 ASSAY OF MAGNESIUM: CPT

## 2020-04-27 PROCEDURE — 84156 ASSAY OF PROTEIN URINE: CPT

## 2020-04-27 RX ORDER — SPIRONOLACTONE 25 MG/1
25 TABLET ORAL DAILY
Status: DISCONTINUED | OUTPATIENT
Start: 2020-04-27 | End: 2020-04-28

## 2020-04-27 RX ORDER — POTASSIUM CHLORIDE 20 MEQ/1
40 TABLET, EXTENDED RELEASE ORAL ONCE
Status: COMPLETED | OUTPATIENT
Start: 2020-04-27 | End: 2020-04-27

## 2020-04-27 RX ORDER — LISINOPRIL 20 MG/1
40 TABLET ORAL DAILY
Status: DISCONTINUED | OUTPATIENT
Start: 2020-04-28 | End: 2020-04-29 | Stop reason: HOSPADM

## 2020-04-27 RX ORDER — FUROSEMIDE 10 MG/ML
40 INJECTION INTRAMUSCULAR; INTRAVENOUS 2 TIMES DAILY
Status: COMPLETED | OUTPATIENT
Start: 2020-04-27 | End: 2020-04-27

## 2020-04-27 RX ADMIN — FUROSEMIDE 40 MG: 10 INJECTION, SOLUTION INTRAMUSCULAR; INTRAVENOUS at 13:22

## 2020-04-27 RX ADMIN — PROPRANOLOL HYDROCHLORIDE 60 MG: 60 CAPSULE, EXTENDED RELEASE ORAL at 08:16

## 2020-04-27 RX ADMIN — OXYCODONE HYDROCHLORIDE AND ACETAMINOPHEN 2 TABLET: 5; 325 TABLET ORAL at 16:10

## 2020-04-27 RX ADMIN — INSULIN GLARGINE 18 UNITS: 100 INJECTION, SOLUTION SUBCUTANEOUS at 21:35

## 2020-04-27 RX ADMIN — Medication 10 ML: at 21:45

## 2020-04-27 RX ADMIN — INSULIN LISPRO 6 UNITS: 100 INJECTION, SOLUTION INTRAVENOUS; SUBCUTANEOUS at 09:10

## 2020-04-27 RX ADMIN — Medication 10 ML: at 08:19

## 2020-04-27 RX ADMIN — SPIRONOLACTONE 25 MG: 25 TABLET ORAL at 13:22

## 2020-04-27 RX ADMIN — POTASSIUM CHLORIDE 40 MEQ: 1500 TABLET, EXTENDED RELEASE ORAL at 13:22

## 2020-04-27 RX ADMIN — LISINOPRIL 20 MG: 20 TABLET ORAL at 08:17

## 2020-04-27 RX ADMIN — INSULIN LISPRO 6 UNITS: 100 INJECTION, SOLUTION INTRAVENOUS; SUBCUTANEOUS at 17:04

## 2020-04-27 RX ADMIN — OXYCODONE HYDROCHLORIDE AND ACETAMINOPHEN 2 TABLET: 5; 325 TABLET ORAL at 09:09

## 2020-04-27 RX ADMIN — INSULIN LISPRO 6 UNITS: 100 INJECTION, SOLUTION INTRAVENOUS; SUBCUTANEOUS at 12:40

## 2020-04-27 RX ADMIN — FUROSEMIDE 40 MG: 10 INJECTION, SOLUTION INTRAMUSCULAR; INTRAVENOUS at 17:04

## 2020-04-27 RX ADMIN — ENOXAPARIN SODIUM 40 MG: 40 INJECTION SUBCUTANEOUS at 08:17

## 2020-04-27 RX ADMIN — OXYCODONE HYDROCHLORIDE AND ACETAMINOPHEN 2 TABLET: 5; 325 TABLET ORAL at 23:02

## 2020-04-27 RX ADMIN — OXYCODONE HYDROCHLORIDE AND ACETAMINOPHEN 2 TABLET: 5; 325 TABLET ORAL at 02:46

## 2020-04-27 ASSESSMENT — PAIN - FUNCTIONAL ASSESSMENT
PAIN_FUNCTIONAL_ASSESSMENT: ACTIVITIES ARE NOT PREVENTED

## 2020-04-27 ASSESSMENT — PAIN DESCRIPTION - PAIN TYPE
TYPE_2: ACUTE PAIN
TYPE_2: ACUTE PAIN
TYPE: ACUTE PAIN
TYPE_2: ACUTE PAIN
TYPE: ACUTE PAIN
TYPE: ACUTE PAIN

## 2020-04-27 ASSESSMENT — PAIN DESCRIPTION - FREQUENCY
FREQUENCY: CONTINUOUS

## 2020-04-27 ASSESSMENT — PAIN DESCRIPTION - ONSET
ONSET: ON-GOING
ONSET_2: GRADUAL
ONSET_2: GRADUAL
ONSET: ON-GOING
ONSET_2: GRADUAL
ONSET: ON-GOING
ONSET: ON-GOING

## 2020-04-27 ASSESSMENT — PAIN SCALES - GENERAL
PAINLEVEL_OUTOF10: 3
PAINLEVEL_OUTOF10: 7
PAINLEVEL_OUTOF10: 3
PAINLEVEL_OUTOF10: 5
PAINLEVEL_OUTOF10: 6
PAINLEVEL_OUTOF10: 7
PAINLEVEL_OUTOF10: 8
PAINLEVEL_OUTOF10: 6
PAINLEVEL_OUTOF10: 4
PAINLEVEL_OUTOF10: 5
PAINLEVEL_OUTOF10: 5
PAINLEVEL_OUTOF10: 3
PAINLEVEL_OUTOF10: 6

## 2020-04-27 ASSESSMENT — PAIN DESCRIPTION - ORIENTATION
ORIENTATION: LOWER;OUTER
ORIENTATION: MID
ORIENTATION_2: LOWER
ORIENTATION: LOWER;OUTER
ORIENTATION: LOWER;OUTER
ORIENTATION_2: LOWER
ORIENTATION: LOWER;OUTER
ORIENTATION: MID
ORIENTATION_2: LOWER
ORIENTATION: LOWER
ORIENTATION: LOWER;OUTER

## 2020-04-27 ASSESSMENT — PAIN DESCRIPTION - DESCRIPTORS
DESCRIPTORS: SQUEEZING;TIGHTNESS
DESCRIPTORS: TIGHTNESS
DESCRIPTORS: SQUEEZING;TIGHTNESS
DESCRIPTORS: TIGHTNESS
DESCRIPTORS_2: SQUEEZING;TIGHTNESS
DESCRIPTORS_2: SQUEEZING;TIGHTNESS
DESCRIPTORS: TIGHTNESS
DESCRIPTORS_2: SQUEEZING;TIGHTNESS
DESCRIPTORS: TIGHTNESS
DESCRIPTORS: TIGHTNESS
DESCRIPTORS: HEAVINESS;PRESSURE

## 2020-04-27 ASSESSMENT — PAIN DESCRIPTION - LOCATION
LOCATION: BACK
LOCATION_2: LEG
LOCATION_2: LEG
LOCATION: BACK
LOCATION_2: LEG
LOCATION: BACK

## 2020-04-27 ASSESSMENT — PAIN DESCRIPTION - INTENSITY
RATING_2: 1
RATING_2: 2
RATING_2: 6
RATING_2: 0
RATING_2: 0

## 2020-04-27 ASSESSMENT — PAIN DESCRIPTION - PROGRESSION
CLINICAL_PROGRESSION: NOT CHANGED
CLINICAL_PROGRESSION_2: NOT CHANGED
CLINICAL_PROGRESSION: NOT CHANGED
CLINICAL_PROGRESSION_2: NOT CHANGED
CLINICAL_PROGRESSION: NOT CHANGED
CLINICAL_PROGRESSION: NOT CHANGED
CLINICAL_PROGRESSION_2: NOT CHANGED
CLINICAL_PROGRESSION: NOT CHANGED

## 2020-04-27 ASSESSMENT — PAIN DESCRIPTION - DURATION
DURATION_2: CONTINUOUS

## 2020-04-27 NOTE — PROGRESS NOTES
potassium chloride  40 mEq Oral Once    sodium chloride flush  10 mL Intravenous 2 times per day    enoxaparin  40 mg Subcutaneous Daily    propranolol  60 mg Oral Daily    insulin glargine  18 Units Subcutaneous Nightly    insulin lispro (1 Unit Dial)  6 Units Subcutaneous TID AC    insulin lispro  0-12 Units Subcutaneous TID WC    insulin lispro  0-6 Units Subcutaneous Nightly    lisinopril  20 mg Oral Daily      dextrose         Data Review. Labs reviewed by me       CBC:   Recent Labs     04/25/20 0053 04/26/20 0416 04/27/20  1010   WBC 17.8* 17.4* 14.6*   HGB 12.0 11.9* 11.8*   HCT 36.2 36.2 36.3   MCV 85.8 87.8 87.1    361 348     BMP:   Recent Labs     04/25/20 0053 04/26/20 0416 04/27/20  1010    137 140   K 4.3 4.1 3.5    103 102   CO2 25 26 29   PHOS  --  3.6  --    BUN 16 14 15   CREATININE 1.1 1.1 1.0     Magnesium:   Lab Results   Component Value Date    MG 1.90 04/27/2020    MG 1.90 04/26/2020     Lab Results   Component Value Date    CREATININE 1.0 04/27/2020       Arterial Blood Gasses  No results for input(s): PH, PCO2, PO2 in the last 72 hours.     Invalid input(s): [id number], INSPIREDO2    UA:  Recent Labs     04/25/20  1001   COLORU YELLOW   PHUR 6.0   WBCUA 12*   RBCUA 6*   CLARITYU CLOUDY*   SPECGRAV 1.022   LEUKOCYTESUR Negative   UROBILINOGEN 0.2   BILIRUBINUR Negative   BLOODU MODERATE*   GLUCOSEU 250*       LIVER PROFILE:   Recent Labs     04/25/20 0053   AST 27   ALT 25   LIPASE 14.0   BILITOT <0.2   ALKPHOS 122     PT/INR:    Lab Results   Component Value Date    PROTIME 14.1 10/04/2013    INR 1.27 10/04/2013     PTT:    Lab Results   Component Value Date    APTT 27.8 10/04/2013     NILSA:    Lab Results   Component Value Date    NILSA Negative 04/25/2020     CHEMISTRY COMMON GROUP :   Lab Results   Component Value Date    GLUCOSE 81 04/27/2020     Recent Labs     04/25/20 0053 04/26/20 0416 04/27/20  1010   GLUCOSE 201* 106* 81   CALCIUM 8.2* 8.3 8.1*

## 2020-04-27 NOTE — PLAN OF CARE
Problem: Falls - Risk of:  Goal: Will remain free from falls  Description: Will remain free from falls  Outcome: Met This Shift  Note: Pt is wearing the fall bracelet and yellow nonskid socks. Bed is in lowest position, locked, siderails up 2/4, and bed alarm on. Pt informed of fall risks, verbalizes understanding, and agrees to ask for help to ambulate. Will monitor.     Goal: Absence of physical injury  Description: Absence of physical injury  Outcome: Met This Shift     Problem: Pain:  Goal: Patient's pain/discomfort is manageable  Description: Patient's pain/discomfort is manageable  Outcome: Met This Shift     Problem: Skin Integrity:  Goal: Skin integrity will stabilize  Description: Skin integrity will stabilize  Outcome: Ongoing

## 2020-04-27 NOTE — CARE COORDINATION
Discharge Planning Assessment  Discharge Planning Assessment  RN/SW discharge planner met with patient/ (and family member) to discuss reason for admission, current living situation, and potential needs at the time of discharge    Demographics/Insurance verified Yes- [employer] [place]    Current type of dwelling:Condo with no steps to enter     Patient from ECF/SW confirmed with:n/a    Living arrangements:lives with spouse and mother     Level of function/Support:independent with adl's and family is supportive. PCP:gave patient [facility] PCP list and [facility] pamphlet     Last Visit to PCP:    DME:none     Active with any community resources/agencies/skilled home care:none     Medication compliance issues: pt now has medicaid - will participate in meds to bed. Pt states her pharmacy is [facility]    Financial issues that could impact healthcare: medicaid         Tentative discharge plan:home with meds to bed and pcp referrals   Discussed and provided facilities of choice if transition to a skilled nursing facility is required at the time of discharge not anticipated       Discussed with patient and/or family that on the day of discharge home tentative time of discharge will be between 10 AM and noon.     Transportation at the time of discharge: family member     Inez Modi RN, BSN  [phone]

## 2020-04-27 NOTE — PROGRESS NOTES
ml      Wt Readings from Last 3 Encounters:   04/26/20 226 lb 10.1 oz (102.8 kg)   05/09/19 197 lb (89.4 kg)   04/21/19 168 lb (76.2 kg)       General appearance:  Appears comfortable  Eyes: Sclera clear. Pupils equal.  ENT: Moist oral mucosa. Trachea midline, no adenopathy. Cardiovascular: Regular rhythm, normal S1, S2. No murmur. No edema in lower extremities  Respiratory: Not using accessory muscles. Good inspiratory effort. Clear to auscultation bilaterally, no wheeze or crackles. GI: Abdomen soft, no tenderness, not distended, normal bowel sounds  Musculoskeletal: No cyanosis in digits, neck supple  Neurology: CN 2-12 grossly intact. No speech or motor deficits  Psych: Normal affect. Alert and oriented in time, place and person  Skin: Warm, dry, normal turgor    Labs and Tests:  CBC:   Recent Labs     04/25/20  0053 04/26/20  0416 04/27/20  1010   WBC 17.8* 17.4* 14.6*   HGB 12.0 11.9* 11.8*    361 348     BMP:    Recent Labs     04/25/20  0053 04/26/20  0416 04/27/20  1010    137 140   K 4.3 4.1 3.5    103 102   CO2 25 26 29   BUN 16 14 15   CREATININE 1.1 1.1 1.0   GLUCOSE 201* 106* 81     Hepatic:   Recent Labs     04/25/20  0053   AST 27   ALT 25   BILITOT <0.2   ALKPHOS 122       Discussed care with family and patient             Spent 30  minutes with patient and family at bedside and on unit reviewing medical records and labs, spent greater than 50% time counseling patient and family on diagnosis and plan   Problem List  Principal Problem:    Nephrotic syndrome  Active Problems:    Type 2 diabetes mellitus, with long-term current use of insulin ([facility] [address]    Hypertension, uncontrolled    Peripheral edema    Pulmonary edema  Resolved Problems:    * No resolved hospital problems.  *       Assessment & Plan:   Nephrotic syndrome  -Likely nephrotic from diabetic and uncontrolled hypertension  -Continue IV Lasix  -24 urine when collected will follow further recommendation      Uncontrolled diabetes  -Continue to titrate medication    Hypertension  -Continue titrating medication as well    Diet: DIET CARB CONTROL; Low Sodium (2 GM); Daily Fluid Restriction: Dry Tray  Code:Full Code  DVT PPX lovenox       Julio C Ruano MD   4/27/2020 12:30 PM                                                                                                                  [facility] PROGRESS NOTE    4/27/2020 12:30 PM        Name: Carlo Fraser . Admitted: 4/25/2020  Primary Care Provider: No primary care provider on file. (Tel: None)                        Subjective:  . No acute events overnight. Resting well. Pain control. Diet ok. Labs reviewed  Denies any chest pain sob.      Reviewed interval ancillary notes    Current Medications  sodium chloride flush 0.9 % injection 10 mL, 2 times per day  sodium chloride flush 0.9 % injection 10 mL, PRN  acetaminophen (TYLENOL) tablet 650 mg, Q6H PRN    Or  acetaminophen (TYLENOL) suppository 650 mg, Q6H PRN  polyethylene glycol (GLYCOLAX) packet 17 g, Daily PRN  promethazine (PHENERGAN) tablet 12.5 mg, Q6H PRN    Or  ondansetron (ZOFRAN) injection 4 mg, Q6H PRN  enoxaparin (LOVENOX) injection 40 mg, Daily  oxyCODONE-acetaminophen (PERCOCET) 5-325 MG per tablet 2 tablet, Q6H PRN  propranolol (INDERAL LA) extended release capsule 60 mg, Daily  insulin glargine (LANTUS;BASAGLAR) injection pen 18 Units, Nightly  insulin lispro (1 Unit Dial) 6 Units, TID AC  glucose (GLUTOSE) 40 % oral gel 15 g, PRN  dextrose 50 % IV solution, PRN  glucagon (rDNA) injection 1 mg, PRN  dextrose 5 % solution, PRN  insulin lispro (1 Unit Dial) 0-12 Units, TID WC  insulin lispro (1 Unit Dial) 0-6 Units, Nightly  perflutren lipid microspheres (DEFINITY) injection 1.65 mg, ONCE PRN  lisinopril (PRINIVIL;ZESTRIL) tablet 20 mg, Daily  cloNIDine (CATAPRES) tablet 0.1 mg, Q4H PRN        Objective:  BP (!) 158/84   Pulse 84   Temp 97.7 °F (36.5 °C) (Temporal)   Resp 16 Ht 5' 6\" (1.676 m)   Wt 226 lb 10.1 oz (102.8 kg)   LMP 04/01/2020   SpO2 94%   BMI 36.58 kg/m²     Intake/Output Summary (Last 24 hours) at 4/27/2020 1230  Last data filed at 4/27/2020 1640  Gross per 24 hour   Intake 640 ml   Output 1650 ml   Net -1010 ml      Wt Readings from Last 3 Encounters:   04/26/20 226 lb 10.1 oz (102.8 kg)   05/09/19 197 lb (89.4 kg)   04/21/19 168 lb (76.2 kg)       General appearance:  Appears comfortable  Eyes: Sclera clear. Pupils equal.  ENT: Moist oral mucosa. Trachea midline, no adenopathy. Cardiovascular: Regular rhythm, normal S1, S2. No murmur. No edema in lower extremities  Respiratory: Not using accessory muscles. Good inspiratory effort. Clear to auscultation bilaterally, no wheeze or crackles. GI: Abdomen soft, no tenderness, not distended, normal bowel sounds  Musculoskeletal: No cyanosis in digits, neck supple  Neurology: CN 2-12 grossly intact. No speech or motor deficits  Psych: Normal affect.  Alert and oriented in time, place and person  Skin: Warm, dry, normal turgor    Labs and Tests:  CBC:   Recent Labs     04/25/20  0053 04/26/20  0416 04/27/20  1010   WBC 17.8* 17.4* 14.6*   HGB 12.0 11.9* 11.8*    361 348     BMP:    Recent Labs     04/25/20  0053 04/26/20  0416 04/27/20  1010    137 140   K 4.3 4.1 3.5    103 102   CO2 25 26 29   BUN 16 14 15   CREATININE 1.1 1.1 1.0   GLUCOSE 201* 106* 81     Hepatic:   Recent Labs     04/25/20  0053   AST 27   ALT 25   BILITOT <0.2   ALKPHOS 122       Discussed care with family and patient             Spent 30  minutes with patient and family at bedside and on unit reviewing medical records and labs, spent greater than 50% time counseling patient and family on diagnosis and plan   Problem List  Principal Problem:    Nephrotic syndrome  Active Problems:    Type 2 diabetes mellitus, with long-term current use of insulin ([address]    Hypertension, uncontrolled    Peripheral edema    Pulmonary edema  Resolved Problems:    * No resolved hospital problems. *       Assessment & Plan:   Nephrotic syndrome  -Likely nephrotic from diabetic and uncontrolled hypertension  -Continue IV Lasix  -24 urine none studies pending  -With volume overload continue IV Lasix for another day or 2      Uncontrolled diabetes  -A1c is 11.9  -Started medication will need titrated patient was not on anything but  -Continue to titrate medication    Hypertension  -Continue titrating medication as well    Diet: DIET CARB CONTROL; Low Sodium (2 GM);  Daily Fluid Restriction: Dry Tray  Code:Full Code  DVT PPX saji Parham MD   4/27/2020 12:30 PM

## 2020-04-28 ENCOUNTER — APPOINTMENT (OUTPATIENT)
Dept: ULTRASOUND IMAGING | Age: 45
DRG: 468 | End: 2020-04-28
Payer: COMMERCIAL

## 2020-04-28 LAB
ALBUMIN SERPL-MCNC: 1.9 G/DL (ref 3.1–4.9)
ALPHA-1-GLOBULIN: 0.2 G/DL (ref 0.2–0.4)
ALPHA-2-GLOBULIN: 1.4 G/DL (ref 0.4–1.1)
ANION GAP SERPL CALCULATED.3IONS-SCNC: 10 MMOL/L (ref 3–16)
BETA GLOBULIN: 0.8 G/DL (ref 0.9–1.6)
BUN BLDV-MCNC: 16 MG/DL (ref 7–20)
CALCIUM SERPL-MCNC: 8.5 MG/DL (ref 8.3–10.6)
CHLORIDE BLD-SCNC: 105 MMOL/L (ref 99–110)
CO2: 28 MMOL/L (ref 21–32)
CREAT SERPL-MCNC: 1 MG/DL (ref 0.6–1.1)
GAMMA GLOBULIN: 0.4 G/DL (ref 0.6–1.8)
GFR AFRICAN AMERICAN: >60
GFR NON-AFRICAN AMERICAN: 60
GLUCOSE BLD-MCNC: 123 MG/DL (ref 70–99)
GLUCOSE BLD-MCNC: 138 MG/DL (ref 70–99)
GLUCOSE BLD-MCNC: 143 MG/DL (ref 70–99)
GLUCOSE BLD-MCNC: 190 MG/DL (ref 70–99)
GLUCOSE BLD-MCNC: 56 MG/DL (ref 70–99)
GLUCOSE BLD-MCNC: 59 MG/DL (ref 70–99)
GLUCOSE BLD-MCNC: 82 MG/DL (ref 70–99)
LV EF: 55 %
LVEF MODALITY: NORMAL
PERFORMED ON: ABNORMAL
PERFORMED ON: NORMAL
POTASSIUM SERPL-SCNC: 3.5 MMOL/L (ref 3.5–5.1)
SODIUM BLD-SCNC: 143 MMOL/L (ref 136–145)
SPE/IFE INTERPRETATION: NORMAL
TOTAL PROTEIN: 4.7 G/DL (ref 6.4–8.2)
URINE ELECTROPHORESIS INTERP: NORMAL

## 2020-04-28 PROCEDURE — 80048 BASIC METABOLIC PNL TOTAL CA: CPT

## 2020-04-28 PROCEDURE — 6370000000 HC RX 637 (ALT 250 FOR IP): Performed by: INTERNAL MEDICINE

## 2020-04-28 PROCEDURE — 76770 US EXAM ABDO BACK WALL COMP: CPT

## 2020-04-28 PROCEDURE — 1200000000 HC SEMI PRIVATE

## 2020-04-28 PROCEDURE — 6360000002 HC RX W HCPCS: Performed by: INTERNAL MEDICINE

## 2020-04-28 PROCEDURE — 93306 TTE W/DOPPLER COMPLETE: CPT

## 2020-04-28 PROCEDURE — 36415 COLL VENOUS BLD VENIPUNCTURE: CPT

## 2020-04-28 PROCEDURE — 2580000003 HC RX 258: Performed by: INTERNAL MEDICINE

## 2020-04-28 RX ORDER — SPIRONOLACTONE 25 MG/1
50 TABLET ORAL DAILY
Status: DISCONTINUED | OUTPATIENT
Start: 2020-04-29 | End: 2020-04-29 | Stop reason: HOSPADM

## 2020-04-28 RX ORDER — INSULIN LISPRO 100 [IU]/ML
0-6 INJECTION, SOLUTION INTRAVENOUS; SUBCUTANEOUS
Status: DISCONTINUED | OUTPATIENT
Start: 2020-04-28 | End: 2020-04-29 | Stop reason: HOSPADM

## 2020-04-28 RX ORDER — INSULIN LISPRO 100 [IU]/ML
0-3 INJECTION, SOLUTION INTRAVENOUS; SUBCUTANEOUS NIGHTLY
Status: DISCONTINUED | OUTPATIENT
Start: 2020-04-28 | End: 2020-04-29 | Stop reason: HOSPADM

## 2020-04-28 RX ORDER — FUROSEMIDE 40 MG/1
80 TABLET ORAL 2 TIMES DAILY
Status: DISCONTINUED | OUTPATIENT
Start: 2020-04-28 | End: 2020-04-29 | Stop reason: HOSPADM

## 2020-04-28 RX ORDER — INSULIN LISPRO 100 [IU]/ML
3 INJECTION, SOLUTION INTRAVENOUS; SUBCUTANEOUS
Status: DISCONTINUED | OUTPATIENT
Start: 2020-04-28 | End: 2020-04-29 | Stop reason: HOSPADM

## 2020-04-28 RX ADMIN — Medication 10 ML: at 07:56

## 2020-04-28 RX ADMIN — LISINOPRIL 40 MG: 20 TABLET ORAL at 07:56

## 2020-04-28 RX ADMIN — INSULIN LISPRO 3 UNITS: 100 INJECTION, SOLUTION INTRAVENOUS; SUBCUTANEOUS at 17:12

## 2020-04-28 RX ADMIN — PROPRANOLOL HYDROCHLORIDE 60 MG: 60 CAPSULE, EXTENDED RELEASE ORAL at 07:56

## 2020-04-28 RX ADMIN — OXYCODONE HYDROCHLORIDE AND ACETAMINOPHEN 2 TABLET: 5; 325 TABLET ORAL at 05:27

## 2020-04-28 RX ADMIN — OXYCODONE HYDROCHLORIDE AND ACETAMINOPHEN 2 TABLET: 5; 325 TABLET ORAL at 17:47

## 2020-04-28 RX ADMIN — INSULIN LISPRO 6 UNITS: 100 INJECTION, SOLUTION INTRAVENOUS; SUBCUTANEOUS at 11:26

## 2020-04-28 RX ADMIN — FUROSEMIDE 80 MG: 40 TABLET ORAL at 10:24

## 2020-04-28 RX ADMIN — Medication 10 ML: at 20:44

## 2020-04-28 RX ADMIN — SPIRONOLACTONE 25 MG: 25 TABLET ORAL at 07:56

## 2020-04-28 RX ADMIN — FUROSEMIDE 80 MG: 40 TABLET ORAL at 17:47

## 2020-04-28 RX ADMIN — OXYCODONE HYDROCHLORIDE AND ACETAMINOPHEN 2 TABLET: 5; 325 TABLET ORAL at 11:22

## 2020-04-28 RX ADMIN — INSULIN LISPRO 6 UNITS: 100 INJECTION, SOLUTION INTRAVENOUS; SUBCUTANEOUS at 07:58

## 2020-04-28 RX ADMIN — ENOXAPARIN SODIUM 40 MG: 40 INJECTION SUBCUTANEOUS at 07:56

## 2020-04-28 ASSESSMENT — PAIN DESCRIPTION - LOCATION
LOCATION: BACK

## 2020-04-28 ASSESSMENT — PAIN - FUNCTIONAL ASSESSMENT
PAIN_FUNCTIONAL_ASSESSMENT: ACTIVITIES ARE NOT PREVENTED

## 2020-04-28 ASSESSMENT — PAIN DESCRIPTION - FREQUENCY
FREQUENCY: CONTINUOUS

## 2020-04-28 ASSESSMENT — PAIN DESCRIPTION - PROGRESSION
CLINICAL_PROGRESSION: GRADUALLY IMPROVING
CLINICAL_PROGRESSION: GRADUALLY IMPROVING
CLINICAL_PROGRESSION: NOT CHANGED

## 2020-04-28 ASSESSMENT — PAIN DESCRIPTION - DESCRIPTORS
DESCRIPTORS: SQUEEZING;TIGHTNESS

## 2020-04-28 ASSESSMENT — PAIN DESCRIPTION - INTENSITY
RATING_2: 0

## 2020-04-28 ASSESSMENT — PAIN DESCRIPTION - ORIENTATION
ORIENTATION: MID

## 2020-04-28 ASSESSMENT — PAIN DESCRIPTION - PAIN TYPE
TYPE: CHRONIC PAIN
TYPE: ACUTE PAIN
TYPE: ACUTE PAIN
TYPE: CHRONIC PAIN
TYPE: ACUTE PAIN
TYPE: ACUTE PAIN

## 2020-04-28 ASSESSMENT — PAIN DESCRIPTION - ONSET
ONSET: ON-GOING

## 2020-04-28 ASSESSMENT — PAIN SCALES - GENERAL
PAINLEVEL_OUTOF10: 4
PAINLEVEL_OUTOF10: 5
PAINLEVEL_OUTOF10: 5
PAINLEVEL_OUTOF10: 6
PAINLEVEL_OUTOF10: 5
PAINLEVEL_OUTOF10: 3
PAINLEVEL_OUTOF10: 4
PAINLEVEL_OUTOF10: 5
PAINLEVEL_OUTOF10: 4
PAINLEVEL_OUTOF10: 8

## 2020-04-28 NOTE — PROGRESS NOTES
recommendation      Uncontrolled diabetes  -Continue to titrate medication    Hypertension  -Continue titrating medication as well    Diet: DIET CARB CONTROL; Low Sodium (2 GM); Daily Fluid Restriction: Dry Tray  Code:Full Code  DVT PPX lovenox       Giovanna Ennis MD   4/28/2020 11:16 AM                                                                                                                  [facility]    4/28/2020 11:16 AM        Name: Heidi Howard . Admitted: 4/25/2020  Primary Care Provider: No primary care provider on file. (Tel: None)                        Subjective:  . No acute events overnight. Resting well. Pain control. Diet ok. Labs reviewed  Denies any chest pain sob.      Reviewed interval ancillary notes    Current Medications  [START ON 4/29/2020] spironolactone (ALDACTONE) tablet 50 mg, Daily  furosemide (LASIX) tablet 80 mg, BID  lisinopril (PRINIVIL;ZESTRIL) tablet 40 mg, Daily  sodium chloride flush 0.9 % injection 10 mL, 2 times per day  sodium chloride flush 0.9 % injection 10 mL, PRN  acetaminophen (TYLENOL) tablet 650 mg, Q6H PRN    Or  acetaminophen (TYLENOL) suppository 650 mg, Q6H PRN  polyethylene glycol (GLYCOLAX) packet 17 g, Daily PRN  promethazine (PHENERGAN) tablet 12.5 mg, Q6H PRN    Or  ondansetron (ZOFRAN) injection 4 mg, Q6H PRN  enoxaparin (LOVENOX) injection 40 mg, Daily  oxyCODONE-acetaminophen (PERCOCET) 5-325 MG per tablet 2 tablet, Q6H PRN  propranolol (INDERAL LA) extended release capsule 60 mg, Daily  insulin glargine (LANTUS;BASAGLAR) injection pen 18 Units, Nightly  insulin lispro (1 Unit Dial) 6 Units, TID AC  glucose (GLUTOSE) 40 % oral gel 15 g, PRN  dextrose 50 % IV solution, PRN  glucagon (rDNA) injection 1 mg, PRN  dextrose 5 % solution, PRN  insulin lispro (1 Unit Dial) 0-12 Units, TID WC  insulin lispro (1 Unit Dial) 0-6 Units, Nightly  perflutren lipid microspheres (DEFINITY) injection 1.65 mg, ONCE PRN  cloNIDine (CATAPRES) tablet 0.1 mg, Q4H PRN        Objective:  BP (!) 176/87   Pulse 87   Temp 98.6 °F (37 °C) (Temporal)   Resp 18   Ht 5' 6\" (1.676 m)   Wt 218 lb 7.6 oz (99.1 kg)   LMP 04/01/2020   SpO2 99%   BMI 35.26 kg/m²     Intake/Output Summary (Last 24 hours) at 4/28/2020 1116  Last data filed at 4/28/2020 0618  Gross per 24 hour   Intake 600 ml   Output 2100 ml   Net -1500 ml      Wt Readings from Last 3 Encounters:   04/28/20 218 lb 7.6 oz (99.1 kg)   05/09/19 197 lb (89.4 kg)   04/21/19 168 lb (76.2 kg)       General appearance:  Appears comfortable  Eyes: Sclera clear. Pupils equal.  ENT: Moist oral mucosa. Trachea midline, no adenopathy. Cardiovascular: Regular rhythm, normal S1, S2. No murmur. No edema in lower extremities  Respiratory: Not using accessory muscles. Good inspiratory effort. Clear to auscultation bilaterally, no wheeze or crackles. GI: Abdomen soft, no tenderness, not distended, normal bowel sounds  Musculoskeletal: No cyanosis in digits, neck supple  Neurology: CN 2-12 grossly intact. No speech or motor deficits  Psych: Normal affect. Alert and oriented in time, place and person  Skin: Warm, dry, normal turgor    Labs and Tests:  CBC:   Recent Labs     04/26/20  0416 04/27/20  1010   WBC 17.4* 14.6*   HGB 11.9* 11.8*    348     BMP:    Recent Labs     04/26/20  0416 04/27/20  1010    140   K 4.1 3.5    102   CO2 26 29   BUN 14 15   CREATININE 1.1 1.0   GLUCOSE 106* 81     Hepatic:   No results for input(s): AST, ALT, ALB, BILITOT, ALKPHOS in the last 72 hours.     Discussed care with family and patient             Spent 30  minutes with patient and family at bedside and on unit reviewing medical records and labs, spent greater than 50% time counseling patient and family on diagnosis and plan   Problem List  Principal Problem:    Nephrotic syndrome  Active Problems:    Type 2 diabetes mellitus, with long-term current use of insulin ([address]

## 2020-04-29 VITALS
HEART RATE: 91 BPM | WEIGHT: 214 LBS | DIASTOLIC BLOOD PRESSURE: 95 MMHG | OXYGEN SATURATION: 96 % | RESPIRATION RATE: 14 BRPM | SYSTOLIC BLOOD PRESSURE: 182 MMHG | BODY MASS INDEX: 34.39 KG/M2 | HEIGHT: 66 IN | TEMPERATURE: 98.2 F

## 2020-04-29 LAB
ANCA IFA: NORMAL
ANION GAP SERPL CALCULATED.3IONS-SCNC: 8 MMOL/L (ref 3–16)
BUN BLDV-MCNC: 20 MG/DL (ref 7–20)
CALCIUM SERPL-MCNC: 8.4 MG/DL (ref 8.3–10.6)
CHLORIDE BLD-SCNC: 103 MMOL/L (ref 99–110)
CO2: 28 MMOL/L (ref 21–32)
CREAT SERPL-MCNC: 1.1 MG/DL (ref 0.6–1.1)
GFR AFRICAN AMERICAN: >60
GFR NON-AFRICAN AMERICAN: 54
GLUCOSE BLD-MCNC: 156 MG/DL (ref 70–99)
GLUCOSE BLD-MCNC: 188 MG/DL (ref 70–99)
GLUCOSE BLD-MCNC: 198 MG/DL (ref 70–99)
PERFORMED ON: ABNORMAL
PERFORMED ON: ABNORMAL
POTASSIUM SERPL-SCNC: 3.8 MMOL/L (ref 3.5–5.1)
SODIUM BLD-SCNC: 139 MMOL/L (ref 136–145)

## 2020-04-29 PROCEDURE — 2580000003 HC RX 258: Performed by: INTERNAL MEDICINE

## 2020-04-29 PROCEDURE — 6370000000 HC RX 637 (ALT 250 FOR IP): Performed by: INTERNAL MEDICINE

## 2020-04-29 PROCEDURE — 36415 COLL VENOUS BLD VENIPUNCTURE: CPT

## 2020-04-29 PROCEDURE — 6370000000 HC RX 637 (ALT 250 FOR IP): Performed by: HOSPITALIST

## 2020-04-29 PROCEDURE — 80048 BASIC METABOLIC PNL TOTAL CA: CPT

## 2020-04-29 RX ORDER — INSULIN LISPRO 100 [IU]/ML
0-6 INJECTION, SOLUTION INTRAVENOUS; SUBCUTANEOUS
Qty: 3 PEN | Refills: 0 | Status: SHIPPED | OUTPATIENT
Start: 2020-04-29 | End: 2022-03-17 | Stop reason: SDUPTHER

## 2020-04-29 RX ORDER — FUROSEMIDE 80 MG
80 TABLET ORAL 2 TIMES DAILY
Qty: 60 TABLET | Refills: 3 | Status: ON HOLD | OUTPATIENT
Start: 2020-04-29 | End: 2020-08-27

## 2020-04-29 RX ORDER — SPIRONOLACTONE 50 MG/1
50 TABLET, FILM COATED ORAL DAILY
Qty: 30 TABLET | Refills: 3 | Status: ON HOLD | OUTPATIENT
Start: 2020-04-29 | End: 2021-02-26 | Stop reason: HOSPADM

## 2020-04-29 RX ORDER — LISINOPRIL 40 MG/1
40 TABLET ORAL DAILY
Qty: 30 TABLET | Refills: 3 | Status: ON HOLD | OUTPATIENT
Start: 2020-04-29 | End: 2021-02-26 | Stop reason: HOSPADM

## 2020-04-29 RX ADMIN — Medication 10 ML: at 08:22

## 2020-04-29 RX ADMIN — PROPRANOLOL HYDROCHLORIDE 60 MG: 60 CAPSULE, EXTENDED RELEASE ORAL at 08:21

## 2020-04-29 RX ADMIN — LISINOPRIL 40 MG: 20 TABLET ORAL at 08:21

## 2020-04-29 RX ADMIN — FUROSEMIDE 80 MG: 40 TABLET ORAL at 08:21

## 2020-04-29 RX ADMIN — OXYCODONE HYDROCHLORIDE AND ACETAMINOPHEN 2 TABLET: 5; 325 TABLET ORAL at 00:49

## 2020-04-29 RX ADMIN — INSULIN LISPRO 3 UNITS: 100 INJECTION, SOLUTION INTRAVENOUS; SUBCUTANEOUS at 08:22

## 2020-04-29 RX ADMIN — INSULIN LISPRO 1 UNITS: 100 INJECTION, SOLUTION INTRAVENOUS; SUBCUTANEOUS at 08:22

## 2020-04-29 RX ADMIN — OXYCODONE HYDROCHLORIDE AND ACETAMINOPHEN 2 TABLET: 5; 325 TABLET ORAL at 06:53

## 2020-04-29 RX ADMIN — SPIRONOLACTONE 50 MG: 25 TABLET ORAL at 08:21

## 2020-04-29 ASSESSMENT — PAIN DESCRIPTION - DESCRIPTORS
DESCRIPTORS: SQUEEZING;TIGHTNESS

## 2020-04-29 ASSESSMENT — PAIN DESCRIPTION - PAIN TYPE
TYPE: CHRONIC PAIN

## 2020-04-29 ASSESSMENT — PAIN DESCRIPTION - FREQUENCY
FREQUENCY: CONTINUOUS

## 2020-04-29 ASSESSMENT — PAIN DESCRIPTION - ONSET
ONSET: ON-GOING

## 2020-04-29 ASSESSMENT — PAIN DESCRIPTION - LOCATION
LOCATION: BACK

## 2020-04-29 ASSESSMENT — PAIN SCALES - GENERAL
PAINLEVEL_OUTOF10: 6
PAINLEVEL_OUTOF10: 7
PAINLEVEL_OUTOF10: 5
PAINLEVEL_OUTOF10: 3
PAINLEVEL_OUTOF10: 7
PAINLEVEL_OUTOF10: 6
PAINLEVEL_OUTOF10: 6

## 2020-04-29 ASSESSMENT — PAIN DESCRIPTION - INTENSITY
RATING_2: 0

## 2020-04-29 ASSESSMENT — PAIN DESCRIPTION - PROGRESSION
CLINICAL_PROGRESSION: GRADUALLY IMPROVING

## 2020-04-29 ASSESSMENT — PAIN DESCRIPTION - ORIENTATION
ORIENTATION: MID

## 2020-04-29 ASSESSMENT — PAIN - FUNCTIONAL ASSESSMENT
PAIN_FUNCTIONAL_ASSESSMENT: ACTIVITIES ARE NOT PREVENTED

## 2020-04-29 NOTE — PROGRESS NOTES
Data- discharge order received, pt verbalized agreement to discharge, disposition to previous residence, no needs for HHC/DME. Action- discharge instructions prepared and given to patient, pt verbalized understanding. Medication information packet given r/t NEW and/or CHANGED prescriptions emphasizing name/purpose/side effects, pt verbalized understanding. Discharge instruction summary: Diet- low sodium 800ML restriction, Activity- as tolerated, Primary Care Physician as follows: No primary care provider on file. None f/u appointment 3 weeks with nephrology, immunizations reviewed and updated, prescription medications filled at [facility]. Inpatient surgical procedure precautions reviewed: na CHF Education reviewed. Pt/ Family has had a total of 60 minutes CHF education this admission encounter. Response- Pt belongings gathered, IV removed. Disposition is home (no HHC/DME needs), transported with [facility], taken to lobby via w/c w/ RN, no complications.

## 2020-04-29 NOTE — PROGRESS NOTES
MD Jose Killian MD Isadora Sima, MD                                  Office: [phone]                 Fax: [phone]          Get Me Listed                     NEPHROLOGY INPATIENT PROGRESS NOTE:     PATIENT NAME: Carmen Hicks  : 1975  MRN: [id number]      Subjective:   Seen this am.  Responding to po diuretics  Leg edema better. No SOB    Assessment and Plan   Generalized anasarca/edema. Switched to po Lasix. Nephrotic syndrome Range Proteinuria. Type 2 diabetes ×20 years on insulin and Diabetic Retinopathy  Most probably has Diabetic Nephropathy also. Increased Lisinopril to 40mg daily. Increased Spironolactone. Hypertension. Uncontrolled. Follow as outpt. Anemia. Follow Hgb. Bilateral pleural effusion. In setting of Hypoalbuminemia  Small pericardial effusion on CT scan  Crea at 1  Microscopic Hematuria - could be due to DM Nephropathy. Follow ANCA but Vasculitis unlikely. Okay for D/C, f/u in office in 3 weeks. EXAM  Vitals:    20 0815   BP: (!) 182/95   Pulse: 91   Resp:    Temp:    SpO2: 96%       Intake/Output Summary (Last 24 hours) at 2020 1216  Last data filed at 2020 2030  Gross per 24 hour   Intake 600 ml   Output 950 ml   Net -350 ml        Not Ill Appearing. No respiratory distress  Awake alert  uncontrolled hypertension  External exam of the ears and nose are normal  HENT: exam is normal  Eyes: Pupils are equal, round  CVS.  Heart sounds are normal.  RS. clear  PA soft , bowel sounds are normal   Skin No rash , No palpable nodules  Musculoskeletal: Normal range of motion. 3+ edema     Principal Problem:    Nephrotic syndrome  Active Problems:    Type 2 diabetes mellitus, with long-term current use of insulin (HCC)    Hypertension, uncontrolled    Peripheral edema    Pulmonary edema  Resolved Problems:    * No resolved hospital problems.  *        Medications Reviewed by me   spironolactone  50 mg Oral Daily X Ray reviewed by me  CT chest noted  EF-55% with grade 2 DD    Electronically Signed: Deborah Price MD 4/29/2020

## 2020-04-29 NOTE — DISCHARGE INSTR - COC
Expiration Resolved Resolved By    [facility] 19 Wound Culture              Nurse Assessment:  Last Vital Signs: BP (!) 182/95   Pulse 91   Temp 98.2 °F (36.8 °C) (Oral)   Resp 14   Ht 5' 6\" (1.676 m)   Wt 214 lb (97.1 kg)   LMP 2020   SpO2 96%   BMI 34.54 kg/m²     Last documented pain score (0-10 scale): Pain Level: 3  Last Weight:   Wt Readings from Last 1 Encounters:   20 214 lb (97.1 kg)     Mental Status:  {IP PT MENTAL STATUS:}    IV Access:  { SARAH IV ACCESS:219643247}    Nursing Mobility/ADLs:  Walking   {P DME FZXK:741608406}  Transfer  {P DME :276074069}  Bathing  {[facility] DME AYUQ:530114146}  Dressing  {[facility] DME LHDJ:812245277}  Toileting  {[facility] DME WRSY:081346479}  Feeding  {[facility] DME TSTM:960142766}  Med Admin  {[facility] DME FCZW:190942035}  Med Delivery   { SARAH MED Delivery:554809517}    Wound Care Documentation and Therapy:  Incision 10/05/13 Other (Comment) Left (Active)   Number of days: 2398       Wound 19 Toe (Comment  which one) Anterior;Left;Medial Dry, 2 cm by 1 cm brown area surrounded by white tisue, slight redness to great toe generally (Active)   Number of days: 373        Elimination:  Continence:   · Bowel: {YES / BI:28346}  · Bladder: {YES / YO:32248}  Urinary Catheter: {Urinary Catheter:640022472}   Colostomy/Ileostomy/Ileal Conduit: {YES / TP:70637}       Date of Last BM: ***    Intake/Output Summary (Last 24 hours) at 202044  Last data filed at 2020  Gross per 24 hour   Intake 800 ml   Output 1050 ml   Net -250 ml     I/O last 3 completed shifts:   In: 800 [P.O.:800]  Out: 1050 [Urine:1050]    Safety Concerns:     [address] SARAH Safety Concerns:815834603}    Impairments/Disabilities:      [address] SARAH Impairments/Disabilities:730536162}    Nutrition Therapy:  Current Nutrition Therapy:   [address] SARAH Diet List:831654352}    Routes of Feeding: {CHP DME Other Feedings:584091115}  Liquids: {Slp liquid thickness:30433}  Daily Fluid Restriction: {CHP DME Yes amt example:336538925}  Last Modified Barium Swallow with Video (Video Swallowing Test): {Done Not Done FL:361443782}    Treatments at the Time of Hospital Discharge:   Respiratory Treatments: ***  Oxygen Therapy:  {Therapy; copd oxygen:89459}  Ventilator:    { CC Vent VRVO:086808391}    Rehab Therapies: {THERAPEUTIC INTERVENTION:2396301914}  Weight Bearing Status/Restrictions: { CC Weight Bearin}  Other Medical Equipment (for information only, NOT a DME order):  {EQUIPMENT:025187539}  Other Treatments: ***    Patient's personal belongings (please select all that are sent with patient):  {CHP DME Belongings:774906373}    RN SIGNATURE:  {Esignature:290219162}    CASE MANAGEMENT/SOCIAL WORK SECTION    Inpatient Status Date: ***    Readmission Risk Assessment Score:  Readmission Risk              Risk of Unplanned Readmission:        12           Discharging to Facility/ Agency   · Name:   · Address:  · Phone:  · Fax:    Dialysis Facility (if applicable)   · Name:  · Address:  · Dialysis Schedule:  · Phone:  · Fax:    / signature: {Esignature:252085101}    PHYSICIAN SECTION    Prognosis: {Prognosis:3565102445}    Condition at Discharge: [address] [place] [address] Patient Condition:824502700}    Rehab Potential (if transferring to Rehab): {Prognosis:5927954005}    Recommended Labs or Other Treatments After Discharge: ***    Physician Certification: I certify the above information and transfer of Munir Murray  is necessary for the continuing treatment of the diagnosis listed and that she requires {Admit to Appropriate Level of Care:41420} for {GREATER/LESS:796278661} 30 days.      Update Admission H&P: {CHP DME Changes in CVNDK:749010597}    PHYSICIAN SIGNATURE:  {Esignature:168395571}

## 2020-04-29 NOTE — PLAN OF CARE
Problem: Falls - Risk of:  Goal: Will remain free from falls  Description: Will remain free from falls  Outcome: Met This Shift  Note: Bed is in lowest position, locked, side rails up 2/4, and call light is within reach. Pt informed of fall risks, verbalizes understanding, and agrees to ask for help to ambulate. Will monitor. Goal: Absence of physical injury  Description: Absence of physical injury  Outcome: Met This Shift     Problem: Daily Care:  Goal: Daily care needs are met  Description: Daily care needs are met  Outcome: Met This Shift     Problem: Pain:  Goal: Control of chronic pain  Description: Control of chronic pain  Outcome: Ongoing  Note: Pt has chronic back pain.      Problem: Serum Glucose Level - Abnormal:  Goal: Ability to maintain appropriate glucose levels will improve  Description: Ability to maintain appropriate glucose levels will improve  Outcome: Ongoing

## 2020-04-30 NOTE — DISCHARGE SUMMARY
[facility] DISCHARGE SUMMARY    Patient Demographics    Patient. Rodger Ortiz  Date of Birth. 1975  MRN. [id number]     Primary care provider. No primary care provider on file. (Tel: None)    Admit date: 4/25/2020    Discharge date (blank if same as Note Date): 4/29/2020  Note Date: 4/30/2020     Reason for Hospitalization. Chief Complaint   Patient presents with    Other     Pt c/o of all over body swelling states that she started with leg swelling when standing too long in feb and it has progressed to now she has swelling throughout body pt states she is unaware of why this is happening or what happened. Denies sob, fever, cp. pt has rash to the right arm that has been there since [facility]. Nephrotic syndrome  -Likely secondary to uncontrolled diabetes and hypertension.  -Was treated with IV Lasix transition to p.o. Lasix and discharged outpatient follow-up with nephrology    Uncontrolled diabetes  -Started on Lantus need titration outpatient new PCP. Hypertension as noted above    Consults. IP CONSULT TO HOSPITALIST  IP CONSULT TO NEPHROLOGY    Physical examination on discharge day. BP (!) 182/95   Pulse 91   Temp 98.2 °F (36.8 °C) (Oral)   Resp 14   Ht 5' 6\" (1.676 m)   Wt 214 lb (97.1 kg)   LMP 04/01/2020   SpO2 96%   BMI 34.54 kg/m²   General appearance. Alert. Looks comfortable. HEENT. Sclera clear. Moist mucus membranes. Cardiovascular. Regular rate and rhythm, normal S1, S2. No murmur. Respiratory. Not using accessory muscles. Clear to auscultation bilaterally, no wheeze. Gastrointestinal. Abdomen soft, non-tender, not distended, normal bowel sounds  Neurology. Facial symmetry. No speech deficits. Moving all extremities equally. Extremities. No edema in lower extremities. Skin.  Warm, dry, normal turgor    Condition at time of discharge Problem: Patient Care Overview  Goal: Plan of Care Review  Outcome: Ongoing (interventions implemented as appropriate)  POC reviewed with patient who verbalized understanding. Pt tolerating diet well. Pt worked well with therapy today. Pain being controlled with PCA pain medication. Courtney placed back in patient due to urinary retention. Pt remained free from falls throughout the shift. VSS. No distress noted, will continue to monitor.         drowsiness, sensitivity to the sun        CONTINUE taking these medications    blood glucose test strips strip  Commonly known as:  [facility] AGM-4000 Test  Test four times daily until controlled and then three times daily. Code 250.02  ONE TOUCH ULTRA MONITOR  Notes to patient:  Tomorrow morning. Insulin Pen Needle 31G X 5 MM Misc  Commonly known as:  B-D UF III MINI PEN NEEDLES  by Does not apply route. Insulin Syringe-Needle U-100 30G X 5/16\" 1 ML Misc  1 each by Does not apply route daily     propranolol 60 MG extended release capsule  Commonly known as:  INDERAL LA  Notes to patient:  Use: treat heart failure, prevent future heart attacks, lower blood pressure and heart rate, treat chest pain  Side effects: dizziness, fatigue, and diarrhea           Where to Get Your Medications      These medications were sent to [facility], [address] [address]    Phone:  [phone]   · furosemide 80 MG tablet  · insulin glargine 100 UNIT/ML injection pen  · lisinopril 40 MG tablet  · spironolactone 50 MG tablet     You can get these medications from any pharmacy    Bring a paper prescription for each of these medications  · insulin lispro (1 Unit Dial) 100 UNIT/ML Sopn         Discharge recommendations given to patient. Follow Up. pcp in 1 week   Disposition. home  Activity. activity as tolerated  Diet: No diet orders on file      Spent 45  minutes in discharge process.     Signed:  Spencer Henson MD     4/30/2020 12:02 PM

## 2020-05-11 ENCOUNTER — HOSPITAL ENCOUNTER (OUTPATIENT)
Age: 45
Discharge: HOME | End: 2020-05-11
Payer: COMMERCIAL

## 2020-05-11 LAB
ALBUMIN SERPL-MCNC: 3 G/DL (ref 3.4–5)
ANION GAP SERPL CALCULATED.3IONS-SCNC: 12 MMOL/L (ref 3–16)
BUN BLDV-MCNC: 20 MG/DL (ref 7–20)
CALCIUM SERPL-MCNC: 8.9 MG/DL (ref 8.3–10.6)
CHLORIDE BLD-SCNC: 96 MMOL/L (ref 99–110)
CO2: 30 MMOL/L (ref 21–32)
CREAT SERPL-MCNC: 1.3 MG/DL (ref 0.6–1.1)
CREATININE URINE: 89.6 MG/DL (ref 28–259)
GFR AFRICAN AMERICAN: 54
GFR NON-AFRICAN AMERICAN: 44
GLUCOSE BLD-MCNC: 321 MG/DL (ref 70–99)
HCT VFR BLD CALC: 42.4 % (ref 36–48)
HEMOGLOBIN: 13.9 G/DL (ref 12–16)
MCH RBC QN AUTO: 28.4 PG (ref 26–34)
MCHC RBC AUTO-ENTMCNC: 32.8 G/DL (ref 31–36)
MCV RBC AUTO: 86.5 FL (ref 80–100)
PDW BLD-RTO: 14.9 % (ref 12.4–15.4)
PHOSPHORUS: 3.9 MG/DL (ref 2.5–4.9)
PLATELET # BLD: 459 K/UL (ref 135–450)
PMV BLD AUTO: 8.4 FL (ref 5–10.5)
POTASSIUM SERPL-SCNC: 3.8 MMOL/L (ref 3.5–5.1)
PROTEIN PROTEIN: 1200 MG/DL
PROTEIN/CREAT RATIO: 13.4 MG/DL
RBC # BLD: 4.9 M/UL (ref 4–5.2)
SODIUM BLD-SCNC: 138 MMOL/L (ref 136–145)
WBC # BLD: 14.8 K/UL (ref 4–11)

## 2020-05-11 PROCEDURE — 84156 ASSAY OF PROTEIN URINE: CPT

## 2020-05-11 PROCEDURE — 80069 RENAL FUNCTION PANEL: CPT

## 2020-05-11 PROCEDURE — 82570 ASSAY OF URINE CREATININE: CPT

## 2020-05-11 PROCEDURE — 36415 COLL VENOUS BLD VENIPUNCTURE: CPT

## 2020-05-11 PROCEDURE — 85027 COMPLETE CBC AUTOMATED: CPT

## 2020-08-20 ENCOUNTER — HOSPITAL ENCOUNTER (OUTPATIENT)
Age: 45
Discharge: HOME | End: 2020-08-20
Payer: COMMERCIAL

## 2020-08-20 LAB
ALBUMIN SERPL-MCNC: 2.8 G/DL (ref 3.4–5)
ANION GAP SERPL CALCULATED.3IONS-SCNC: 12 MMOL/L (ref 3–16)
BUN BLDV-MCNC: 18 MG/DL (ref 7–20)
CALCIUM SERPL-MCNC: 8.7 MG/DL (ref 8.3–10.6)
CHLORIDE BLD-SCNC: 106 MMOL/L (ref 99–110)
CO2: 22 MMOL/L (ref 21–32)
CREAT SERPL-MCNC: 1.6 MG/DL (ref 0.6–1.1)
CREATININE URINE: 148.7 MG/DL (ref 28–259)
GFR AFRICAN AMERICAN: 42
GFR NON-AFRICAN AMERICAN: 35
GLUCOSE BLD-MCNC: 159 MG/DL (ref 70–99)
HCT VFR BLD CALC: 37.7 % (ref 36–48)
HEMOGLOBIN: 12.5 G/DL (ref 12–16)
MCH RBC QN AUTO: 29.3 PG (ref 26–34)
MCHC RBC AUTO-ENTMCNC: 33.3 G/DL (ref 31–36)
MCV RBC AUTO: 88 FL (ref 80–100)
PDW BLD-RTO: 16.5 % (ref 12.4–15.4)
PHOSPHORUS: 3.7 MG/DL (ref 2.5–4.9)
PLATELET # BLD: 348 K/UL (ref 135–450)
PMV BLD AUTO: 8.5 FL (ref 5–10.5)
POTASSIUM SERPL-SCNC: 3.7 MMOL/L (ref 3.5–5.1)
PROTEIN PROTEIN: 1540 MG/DL
RBC # BLD: 4.28 M/UL (ref 4–5.2)
SODIUM BLD-SCNC: 140 MMOL/L (ref 136–145)
WBC # BLD: 17.1 K/UL (ref 4–11)

## 2020-08-20 PROCEDURE — 85027 COMPLETE CBC AUTOMATED: CPT

## 2020-08-20 PROCEDURE — 80069 RENAL FUNCTION PANEL: CPT

## 2020-08-20 PROCEDURE — 82570 ASSAY OF URINE CREATININE: CPT

## 2020-08-20 PROCEDURE — 84156 ASSAY OF PROTEIN URINE: CPT

## 2020-08-27 ENCOUNTER — APPOINTMENT (OUTPATIENT)
Dept: CT IMAGING | Age: 45
DRG: 045 | End: 2020-08-27
Payer: COMMERCIAL

## 2020-08-27 ENCOUNTER — HOSPITAL ENCOUNTER (INPATIENT)
Age: 45
LOS: 4 days | Discharge: HOME | DRG: 045 | End: 2020-08-31
Attending: EMERGENCY MEDICINE | Admitting: FAMILY MEDICINE
Payer: COMMERCIAL

## 2020-08-27 ENCOUNTER — APPOINTMENT (OUTPATIENT)
Dept: GENERAL RADIOLOGY | Age: 45
DRG: 045 | End: 2020-08-27
Payer: COMMERCIAL

## 2020-08-27 PROBLEM — I50.32 CHRONIC DIASTOLIC (CONGESTIVE) HEART FAILURE (HCC): Status: ACTIVE | Noted: 2020-08-27

## 2020-08-27 PROBLEM — F17.200 TOBACCO DEPENDENCE: Status: ACTIVE | Noted: 2020-08-27

## 2020-08-27 PROBLEM — R20.0 RIGHT SIDED NUMBNESS: Status: ACTIVE | Noted: 2020-08-27

## 2020-08-27 PROBLEM — N18.30 CHRONIC KIDNEY DISEASE (CKD), STAGE III (MODERATE) (HCC): Status: ACTIVE | Noted: 2020-08-27

## 2020-08-27 LAB
ALBUMIN SERPL-MCNC: 3.2 G/DL (ref 3.4–5)
ALP BLD-CCNC: 144 U/L (ref 40–129)
ALT SERPL-CCNC: 9 U/L (ref 10–40)
ANION GAP SERPL CALCULATED.3IONS-SCNC: 10 MMOL/L (ref 3–16)
APTT: 32.4 SEC (ref 24.2–36.2)
AST SERPL-CCNC: 15 U/L (ref 15–37)
BASOPHILS ABSOLUTE: 0.2 K/UL (ref 0–0.2)
BASOPHILS RELATIVE PERCENT: 1.2 %
BILIRUB SERPL-MCNC: <0.2 MG/DL (ref 0–1)
BILIRUBIN DIRECT: <0.2 MG/DL (ref 0–0.3)
BILIRUBIN, INDIRECT: ABNORMAL MG/DL (ref 0–1)
BUN BLDV-MCNC: 21 MG/DL (ref 7–20)
CALCIUM SERPL-MCNC: 9 MG/DL (ref 8.3–10.6)
CHLORIDE BLD-SCNC: 103 MMOL/L (ref 99–110)
CO2: 23 MMOL/L (ref 21–32)
CREAT SERPL-MCNC: 1.4 MG/DL (ref 0.6–1.1)
EOSINOPHILS ABSOLUTE: 0.6 K/UL (ref 0–0.6)
EOSINOPHILS RELATIVE PERCENT: 3.4 %
ETHANOL: NORMAL MG/DL (ref 0–0.08)
GFR AFRICAN AMERICAN: 49
GFR NON-AFRICAN AMERICAN: 41
GLUCOSE BLD-MCNC: 175 MG/DL (ref 70–99)
GLUCOSE BLD-MCNC: 243 MG/DL (ref 70–99)
GLUCOSE BLD-MCNC: 278 MG/DL (ref 70–99)
HCT VFR BLD CALC: 38.2 % (ref 36–48)
HEMOGLOBIN: 13.3 G/DL (ref 12–16)
INR BLD: 0.91 (ref 0.86–1.14)
LYMPHOCYTES ABSOLUTE: 3.8 K/UL (ref 1–5.1)
LYMPHOCYTES RELATIVE PERCENT: 22 %
MAGNESIUM: 1.7 MG/DL (ref 1.8–2.4)
MCH RBC QN AUTO: 30.4 PG (ref 26–34)
MCHC RBC AUTO-ENTMCNC: 34.9 G/DL (ref 31–36)
MCV RBC AUTO: 87.2 FL (ref 80–100)
MONOCYTES ABSOLUTE: 0.9 K/UL (ref 0–1.3)
MONOCYTES RELATIVE PERCENT: 5.2 %
NEUTROPHILS ABSOLUTE: 11.8 K/UL (ref 1.7–7.7)
NEUTROPHILS RELATIVE PERCENT: 68.2 %
PDW BLD-RTO: 16.2 % (ref 12.4–15.4)
PERFORMED ON: ABNORMAL
PERFORMED ON: ABNORMAL
PLATELET # BLD: 482 K/UL (ref 135–450)
PMV BLD AUTO: 7.8 FL (ref 5–10.5)
POTASSIUM REFLEX MAGNESIUM: 3.4 MMOL/L (ref 3.5–5.1)
PROTHROMBIN TIME: 10.6 SEC (ref 10–13.2)
RBC # BLD: 4.38 M/UL (ref 4–5.2)
SODIUM BLD-SCNC: 136 MMOL/L (ref 136–145)
TOTAL PROTEIN: 6.5 G/DL (ref 6.4–8.2)
TROPONIN: 0.05 NG/ML
WBC # BLD: 17.3 K/UL (ref 4–11)

## 2020-08-27 PROCEDURE — 84443 ASSAY THYROID STIM HORMONE: CPT

## 2020-08-27 PROCEDURE — 83036 HEMOGLOBIN GLYCOSYLATED A1C: CPT

## 2020-08-27 PROCEDURE — 6360000002 HC RX W HCPCS: Performed by: FAMILY MEDICINE

## 2020-08-27 PROCEDURE — 6370000000 HC RX 637 (ALT 250 FOR IP): Performed by: FAMILY MEDICINE

## 2020-08-27 PROCEDURE — 36415 COLL VENOUS BLD VENIPUNCTURE: CPT

## 2020-08-27 PROCEDURE — 85610 PROTHROMBIN TIME: CPT

## 2020-08-27 PROCEDURE — 96374 THER/PROPH/DIAG INJ IV PUSH: CPT

## 2020-08-27 PROCEDURE — 80048 BASIC METABOLIC PNL TOTAL CA: CPT

## 2020-08-27 PROCEDURE — 83735 ASSAY OF MAGNESIUM: CPT

## 2020-08-27 PROCEDURE — 99285 EMERGENCY DEPT VISIT HI MDM: CPT

## 2020-08-27 PROCEDURE — 71045 X-RAY EXAM CHEST 1 VIEW: CPT

## 2020-08-27 PROCEDURE — 2580000003 HC RX 258: Performed by: FAMILY MEDICINE

## 2020-08-27 PROCEDURE — G0480 DRUG TEST DEF 1-7 CLASSES: HCPCS

## 2020-08-27 PROCEDURE — 70496 CT ANGIOGRAPHY HEAD: CPT

## 2020-08-27 PROCEDURE — 2500000003 HC RX 250 WO HCPCS: Performed by: EMERGENCY MEDICINE

## 2020-08-27 PROCEDURE — 2060000000 HC ICU INTERMEDIATE R&B

## 2020-08-27 PROCEDURE — 94760 N-INVAS EAR/PLS OXIMETRY 1: CPT

## 2020-08-27 PROCEDURE — 6360000004 HC RX CONTRAST MEDICATION: Performed by: EMERGENCY MEDICINE

## 2020-08-27 PROCEDURE — 70450 CT HEAD/BRAIN W/O DYE: CPT

## 2020-08-27 PROCEDURE — 93005 ELECTROCARDIOGRAM TRACING: CPT | Performed by: EMERGENCY MEDICINE

## 2020-08-27 PROCEDURE — 80076 HEPATIC FUNCTION PANEL: CPT

## 2020-08-27 PROCEDURE — 85025 COMPLETE CBC W/AUTO DIFF WBC: CPT

## 2020-08-27 PROCEDURE — 84484 ASSAY OF TROPONIN QUANT: CPT

## 2020-08-27 PROCEDURE — 85730 THROMBOPLASTIN TIME PARTIAL: CPT

## 2020-08-27 RX ORDER — PROPRANOLOL HCL 60 MG
60 CAPSULE, EXTENDED RELEASE 24HR ORAL DAILY
Status: DISCONTINUED | OUTPATIENT
Start: 2020-08-27 | End: 2020-08-31 | Stop reason: HOSPADM

## 2020-08-27 RX ORDER — LISINOPRIL 20 MG/1
40 TABLET ORAL DAILY
Status: DISCONTINUED | OUTPATIENT
Start: 2020-08-27 | End: 2020-08-31 | Stop reason: HOSPADM

## 2020-08-27 RX ORDER — POTASSIUM CHLORIDE 20 MEQ/1
40 TABLET, EXTENDED RELEASE ORAL ONCE
Status: COMPLETED | OUTPATIENT
Start: 2020-08-27 | End: 2020-08-27

## 2020-08-27 RX ORDER — SODIUM CHLORIDE 0.9 % (FLUSH) 0.9 %
10 SYRINGE (ML) INJECTION EVERY 12 HOURS SCHEDULED
Status: DISCONTINUED | OUTPATIENT
Start: 2020-08-27 | End: 2020-08-31 | Stop reason: HOSPADM

## 2020-08-27 RX ORDER — INSULIN LISPRO 100 [IU]/ML
0-6 INJECTION, SOLUTION INTRAVENOUS; SUBCUTANEOUS NIGHTLY
Status: DISCONTINUED | OUTPATIENT
Start: 2020-08-27 | End: 2020-08-31 | Stop reason: HOSPADM

## 2020-08-27 RX ORDER — FUROSEMIDE 40 MG/1
40 TABLET ORAL 2 TIMES DAILY
Status: DISCONTINUED | OUTPATIENT
Start: 2020-08-27 | End: 2020-08-31 | Stop reason: HOSPADM

## 2020-08-27 RX ORDER — DEXTROSE MONOHYDRATE 50 MG/ML
100 INJECTION, SOLUTION INTRAVENOUS PRN
Status: DISCONTINUED | OUTPATIENT
Start: 2020-08-27 | End: 2020-08-31 | Stop reason: HOSPADM

## 2020-08-27 RX ORDER — LABETALOL HYDROCHLORIDE 5 MG/ML
10 INJECTION, SOLUTION INTRAVENOUS EVERY 4 HOURS PRN
Status: DISCONTINUED | OUTPATIENT
Start: 2020-08-27 | End: 2020-08-31 | Stop reason: HOSPADM

## 2020-08-27 RX ORDER — LABETALOL HYDROCHLORIDE 5 MG/ML
10 INJECTION, SOLUTION INTRAVENOUS ONCE
Status: COMPLETED | OUTPATIENT
Start: 2020-08-27 | End: 2020-08-27

## 2020-08-27 RX ORDER — MAGNESIUM SULFATE IN WATER 40 MG/ML
2 INJECTION, SOLUTION INTRAVENOUS ONCE
Status: COMPLETED | OUTPATIENT
Start: 2020-08-27 | End: 2020-08-28

## 2020-08-27 RX ORDER — ONDANSETRON 2 MG/ML
4 INJECTION INTRAMUSCULAR; INTRAVENOUS EVERY 6 HOURS PRN
Status: DISCONTINUED | OUTPATIENT
Start: 2020-08-27 | End: 2020-08-31 | Stop reason: HOSPADM

## 2020-08-27 RX ORDER — INSULIN LISPRO 100 [IU]/ML
0-12 INJECTION, SOLUTION INTRAVENOUS; SUBCUTANEOUS
Status: DISCONTINUED | OUTPATIENT
Start: 2020-08-28 | End: 2020-08-31 | Stop reason: HOSPADM

## 2020-08-27 RX ORDER — POLYETHYLENE GLYCOL 3350 17 G/17G
17 POWDER, FOR SOLUTION ORAL DAILY PRN
Status: DISCONTINUED | OUTPATIENT
Start: 2020-08-27 | End: 2020-08-31 | Stop reason: HOSPADM

## 2020-08-27 RX ORDER — DEXTROSE MONOHYDRATE 25 G/50ML
12.5 INJECTION, SOLUTION INTRAVENOUS PRN
Status: DISCONTINUED | OUTPATIENT
Start: 2020-08-27 | End: 2020-08-31 | Stop reason: HOSPADM

## 2020-08-27 RX ORDER — ASPIRIN 300 MG/1
300 SUPPOSITORY RECTAL DAILY
Status: DISCONTINUED | OUTPATIENT
Start: 2020-08-27 | End: 2020-08-31 | Stop reason: HOSPADM

## 2020-08-27 RX ORDER — SODIUM CHLORIDE 0.9 % (FLUSH) 0.9 %
10 SYRINGE (ML) INJECTION PRN
Status: DISCONTINUED | OUTPATIENT
Start: 2020-08-27 | End: 2020-08-31 | Stop reason: HOSPADM

## 2020-08-27 RX ORDER — ASPIRIN 81 MG/1
81 TABLET ORAL DAILY
Status: DISCONTINUED | OUTPATIENT
Start: 2020-08-27 | End: 2020-08-31 | Stop reason: HOSPADM

## 2020-08-27 RX ORDER — PROMETHAZINE HYDROCHLORIDE 25 MG/1
12.5 TABLET ORAL EVERY 6 HOURS PRN
Status: DISCONTINUED | OUTPATIENT
Start: 2020-08-27 | End: 2020-08-31 | Stop reason: HOSPADM

## 2020-08-27 RX ORDER — SPIRONOLACTONE 25 MG/1
50 TABLET ORAL DAILY
Status: DISCONTINUED | OUTPATIENT
Start: 2020-08-27 | End: 2020-08-31 | Stop reason: HOSPADM

## 2020-08-27 RX ORDER — NICOTINE 21 MG/24HR
1 PATCH, TRANSDERMAL 24 HOURS TRANSDERMAL DAILY
Status: DISCONTINUED | OUTPATIENT
Start: 2020-08-27 | End: 2020-08-31 | Stop reason: HOSPADM

## 2020-08-27 RX ORDER — ATORVASTATIN CALCIUM 40 MG/1
40 TABLET, FILM COATED ORAL NIGHTLY
Status: DISCONTINUED | OUTPATIENT
Start: 2020-08-27 | End: 2020-08-31 | Stop reason: HOSPADM

## 2020-08-27 RX ORDER — NICOTINE POLACRILEX 4 MG
15 LOZENGE BUCCAL PRN
Status: DISCONTINUED | OUTPATIENT
Start: 2020-08-27 | End: 2020-08-31 | Stop reason: HOSPADM

## 2020-08-27 RX ADMIN — INSULIN LISPRO 1 UNITS: 100 INJECTION, SOLUTION INTRAVENOUS; SUBCUTANEOUS at 22:23

## 2020-08-27 RX ADMIN — Medication 10 ML: at 22:23

## 2020-08-27 RX ADMIN — FUROSEMIDE 40 MG: 40 TABLET ORAL at 22:22

## 2020-08-27 RX ADMIN — DESMOPRESSIN ACETATE 40 MG: 0.2 TABLET ORAL at 22:22

## 2020-08-27 RX ADMIN — LABETALOL HYDROCHLORIDE 10 MG: 5 INJECTION INTRAVENOUS at 17:23

## 2020-08-27 RX ADMIN — IOPAMIDOL 75 ML: 755 INJECTION, SOLUTION INTRAVENOUS at 15:44

## 2020-08-27 RX ADMIN — PROPRANOLOL HYDROCHLORIDE 60 MG: 60 CAPSULE, EXTENDED RELEASE ORAL at 22:22

## 2020-08-27 RX ADMIN — MAGNESIUM SULFATE HEPTAHYDRATE 2 G: 40 INJECTION, SOLUTION INTRAVENOUS at 22:23

## 2020-08-27 RX ADMIN — POTASSIUM CHLORIDE 40 MEQ: 1500 TABLET, EXTENDED RELEASE ORAL at 22:22

## 2020-08-27 RX ADMIN — INSULIN GLARGINE 15 UNITS: 100 INJECTION, SOLUTION SUBCUTANEOUS at 22:24

## 2020-08-27 RX ADMIN — SPIRONOLACTONE 50 MG: 25 TABLET ORAL at 22:22

## 2020-08-27 RX ADMIN — LISINOPRIL 40 MG: 20 TABLET ORAL at 22:22

## 2020-08-27 ASSESSMENT — PAIN SCALES - GENERAL
PAINLEVEL_OUTOF10: 3
PAINLEVEL_OUTOF10: 3

## 2020-08-27 ASSESSMENT — PAIN DESCRIPTION - LOCATION: LOCATION: ARM

## 2020-08-27 ASSESSMENT — PAIN DESCRIPTION - ORIENTATION: ORIENTATION: RIGHT

## 2020-08-27 NOTE — ED PROVIDER NOTES
[facility] ED PROVIDER NOTE    Patient Identification  Pt Name: Elsi Fuentes  MRN: [id number]  Davidgfkelsie 1975  Date of evaluation: 8/27/2020  Provider: Josephine Goff MD  PCP: No primary care provider on file. Chief Complaint  Numbness (Pt reports decreased sensation to entire right side since she woke up this morning at approx 0400. Hx diabetic neuropathy)      HPI  (History provided by patient)  This is a 39 y.o. female who was brought in by self for right-sided numbness. The patient symptoms came on earlier this morning around 4 AM.  The patient states she went to bed around 2 AM, then woke with the symptoms at 4 AM.  She noticed that the entire right side of her body was numb. She initially did not believe she had weakness, but when I was performing my exam, she noticed she was weak on her right side. She also noted that she was having some difficulty coordinating on the right side. Patient states she has some trouble with vision at baseline, but does not believe she has any new visual deficits today. She is having some difficulty walking secondary to this. Patient is not had changes to her speech. She denies headache, nausea, vomiting, dizziness, and other symptoms. She has not been ill recently denies fever. Adilene Dupree ROS  10 systems reviewed, pertinent positives/negatives per HPI otherwise noted to be negative. I have reviewed the following nursing documentation:  Allergies: Amoxicillin; Levofloxacin; Levofloxacin; Vancomycin; and Tape [adhesive tape]    Past medical history:   Past Medical History:   Diagnosis Date    [facility] mellitus [facility] of [facility] ([facility] [address]     Diabetes mellitus, type II ([facility] [address]     2005    Generalized headaches     Infertility     Insomnia     chronic vs lack of time spent to sleep    Migraine headache 11/9/2011    Mixed hyperlipidemia     Otitis media     h/o recurrent    Pelvic abscess in female 10/5/2013    Pneumonia     2004 approx.      Past surgical never used smokeless tobacco. She reports that she does not drink alcohol or use drugs. Family history:    Family History   Problem Relation Age of Onset    Diabetes Mother     Diabetes Father     High Blood Pressure Father     Cancer Father         colon    Diabetes Sister     Diabetes Paternal Grandmother        Exam  ED Triage Vitals [08/27/20 1525]   BP Temp Temp Source Pulse Resp SpO2 Height Weight   (!) 202/97 97.5 °F (36.4 °C) Tympanic 100 18 97 % 5' 7\" (1.702 m) 191 lb (86.6 kg)     Nursing note and vitals reviewed. Constitutional: Well developed, well nourished. Non-toxic in appearance. HENT:      Head: Normocephalic and atraumatic. Ears: External ears normal.      Nose: Nose normal.     Mouth: Membrane mucosa moist and pink. Eyes: Anicteric sclera. No discharge. Neck: Supple. Trachea midline. Cardiovascular: RRR; no murmurs, rubs, or gallops. Pulmonary/Chest: Effort normal. No respiratory distress. CTAB. No stridor. No wheezes. No rales. Abdominal: Soft. No distension. Musculoskeletal: Moves all extremities. No gross deformity. Neurological: Alert and oriented to person, place, time, and situation. CN II-XII intact as tested. There is obvious drift present in the right upper and right lower extremity. Patient has normal strength in the left upper and lower extremities. Patient has ataxia with right-sided finger-to-nose and heel-to-shin. She has normal left-sided heel nose and finger to shin. .  Diminished sensation to light touch throughout the right side of her body, including her right face. No speech difficulties or slurring. Skin: Warm and dry. No rash. Psychiatric: Normal mood and affect. Behavior is normal.    EKG  The Ekg interpreted by me in the absence of a cardiologist shows. normal sinus rhythm with a rate of 91  Axis is   Normal  QTc is  normal  Intervals and Durations are unremarkable. No specific ST-T wave changes appreciated.   No evidence of acute 136 136 - 145 mmol/L    Potassium reflex Magnesium 3.4 (L) 3.5 - 5.1 mmol/L    Chloride 103 99 - 110 mmol/L    CO2 23 21 - 32 mmol/L    Anion Gap 10 3 - 16    Glucose 278 (H) 70 - 99 mg/dL    BUN 21 (H) 7 - 20 mg/dL    CREATININE 1.4 (H) 0.6 - 1.1 mg/dL    GFR Non- 41 (A) >60    GFR  49 (A) >60    Calcium 9.0 8.3 - 10.6 mg/dL   Protime-INR   Result Value Ref Range    Protime 10.6 10.0 - 13.2 sec    INR 0.91 0.86 - 1.14   APTT   Result Value Ref Range    aPTT 32.4 24.2 - 36.2 sec   Troponin   Result Value Ref Range    Troponin 0.05 (H) <0.01 ng/mL   Magnesium   Result Value Ref Range    Magnesium 1.70 (L) 1.80 - 2.40 mg/dL   Hepatic function panel   Result Value Ref Range    Total Protein 6.5 6.4 - 8.2 g/dL    Alb 3.2 (L) 3.4 - 5.0 g/dL    Alkaline Phosphatase 144 (H) 40 - 129 U/L    ALT 9 (L) 10 - 40 U/L    AST 15 15 - 37 U/L    Total Bilirubin <0.2 0.0 - 1.0 mg/dL    Bilirubin, Direct <0.2 0.0 - 0.3 mg/dL    Bilirubin, Indirect see below 0.0 - 1.0 mg/dL   Ethanol   Result Value Ref Range    Ethanol Lvl None Detected mg/dL   POCT Glucose   Result Value Ref Range    POC Glucose 243 (H) 70 - 99 mg/dl    Performed on ACCU-CHEK    POCT Glucose   Result Value Ref Range    POC Glucose 175 (H) 70 - 99 mg/dl    Performed on ACCU-CHEK        Screenings  NIH Stroke Scale  NIH Stroke Scale Assessed: Yes  Interval: Baseline  Level of Consciousness (1a. ): Alert  LOC Questions (1b. ):  Answers both correctly  LOC Commands (1c. ): Performs both tasks correctly  Best Gaze (2. ): Normal  Visual (3. ): No visual loss  Facial Palsy (4. ): (!) Minor paralysis  Motor Arm, Left (5a. ): No drift  Motor Arm, Right (5b. ): Drift, but does not hit bed  Motor Leg, Left (6a. ): No drift  Motor Leg, Right (6b. ): Some effort against gravity  Limb Ataxia (7. ): (!) Present in one limb  Sensory (8. ): (!) Mild to Moderate  Best Language (9. ): No aphasia  Dysarthria (10. ): Normal  Extinction and Inattention (11): unspecified type        Blood pressure (!) 190/93, pulse 84, temperature 96.4 °F (35.8 °C), temperature source Temporal, resp. rate 18, height 5' 7\" (1.702 m), weight 191 lb (86.6 kg), SpO2 97 %. Disposition:  Admit    This chart was generated using the [address] [place] [address] [address]  dictation system. I created this record but it may contain dictation errors given the limitations of this technology.        Nicki Espinoza MD  08/27/20 0136

## 2020-08-27 NOTE — H&P
Hospital Medicine History and Physical    8/27/2020    Date of Admission: 8/27/2020    Date of Service: Pt seen/examined on 8/27/2020 and admitted to inpatient. Chief complaint:  Chief Complaint   Patient presents with    Numbness     Pt reports decreased sensation to entire right side since she woke up this morning at approx 0400. Hx diabetic neuropathy       History of Presenting Illness: This is a pleasant 39 y.o. female who presented to the emergency room with complaints of complete right-sided numbness and weakness that started around 4 AM this morning. She has a past medical history of insulin-dependent diabetes mellitus, essential hypertension, diabetic nephropathy, tobacco dependence and states she took all of her medications last night and this morning her blood sugar was in the 50s so she drank a regular soda. She has been following with ophthalmology for various eye procedures, states she has a history of migraine headache but is not have any migraines currently. She has diabetic neuropathy in her bilateral lower extremities and she cannot tell if her right leg is numb from neuropathy or something new but definitely feels like her right upper extremity is numb. She denies chest pain, palpitations, fever, chills, nausea, vomiting, diarrhea, abdominal pain, dysuria, productive cough. Past Medical History:      Diagnosis Date    Diabetes mellitus out of control ([facility] [address]     Diabetes mellitus, type II ([address]     2005    Generalized headaches     Infertility     Insomnia     chronic vs lack of time spent to sleep    Migraine headache 11/9/2011    Mixed hyperlipidemia     Otitis media     h/o recurrent    Pelvic abscess in female 10/5/2013    Pneumonia     2004 approx.        Past Surgical History:      Procedure Laterality Date    CERVIX SURGERY      laser tx for dysplasia;1992       Medications (prior to admission):  Prior to Admission medications    Medication Sig Start Date End Date Taking? Authorizing Provider   furosemide (LASIX) 40 MG tablet Take 1 tablet by mouth 2 times daily  Patient taking differently: Take 20 mg by mouth 2 times daily  6/11/20   Vipul Subramanian MD   glucose monitoring kit (FREESTYLE) monitoring kit 1 kit by Does not apply route daily 5/13/20   Vipul Subramanian MD   insulin glargine (LANTUS;BASAGLAR) 100 UNIT/ML injection pen Inject 15 Units into the skin nightly 4/29/20   Mark Luz MD   insulin lispro, 1 Unit Dial, 100 UNIT/ML SOPN Inject 0-6 Units into the skin 3 times daily (with meals) **Corrective Low Dose Algorithm**  Glucose: Dose:               No Insulin  140-199 1 Unit  200-249 2 Units  250-299 3 Units  300-349 4 Units  350-399 5 Units  Over 399 6 Units 4/29/20   Mark Luz MD   lisinopril (PRINIVIL;ZESTRIL) 40 MG tablet Take 1 tablet by mouth daily 4/29/20   Mark Luz MD   furosemide (LASIX) 80 MG tablet Take 1 tablet by mouth 2 times daily 4/29/20   Mark Luz MD   spironolactone (ALDACTONE) 50 MG tablet Take 1 tablet by mouth daily 4/29/20   Mark Luz MD   propranolol (INDERAL LA) 60 MG extended release capsule Take 60 mg by mouth daily 4/22/20   Historical Provider, MD   Insulin Syringe-Needle U-100 30G X 5/16\" 1 ML MISC 1 each by Does not apply route daily 4/25/19   Hayde Felix MD   glucose blood VI test strips (VICTORY AGM-4000 TEST) strip Test four times daily until controlled and then three times daily. Code 250.02  ONE TOUCH ULTRA MONITOR 10/15/13 1/7/19  Emily Pedroza MD   Insulin Pen Needle (B-D UF III MINI PEN NEEDLES) 31G X 5 MM MISC by Does not apply route. 11/9/11   Emily Pedroza MD       Allergy(ies):  Amoxicillin; Levofloxacin; Levofloxacin; Vancomycin; and Tape [adhesive tape]    Social History:  TOBACCO:  reports that she has been smoking cigarettes. She has been smoking about 1.00 pack per day. She has never used smokeless tobacco.  ETOH:  reports no history of alcohol use.     Family History:      Problem Relation Age of Onset    Diabetes Mother     Diabetes Father     High Blood Pressure Father     Cancer Father         colon    Diabetes Sister     Diabetes Paternal Grandmother        Review of Systems:  All other systems are reviewed, positive and negative are noted in HPI. Vitals and physical examination:  BP (!) 182/101   Pulse 87   Temp 97.5 °F (36.4 °C) (Tympanic)   Resp 17   Ht 5' 7\" (1.702 m)   Wt 191 lb (86.6 kg)   SpO2 99%   BMI 29.91 kg/m²   Gen/overall appearance: Not in acute distress. Alert. Head: Normocephalic, atraumatic  Eyes: EOMI, good acuity  ENT:- Oral mucosa moist  Neck: No JVD, thyromegaly  CVS: Nml S1S2, no MRG, RRR  Pulm: Clear bilaterally. No crackles/wheezes  Gastrointestinal: Soft, NT/ND, +BS  Musculoskeletal: No edema. Warm  Neuro: Right upper extremity and lower extremity 2 out of 5 strength, 5 out of 5 strength everywhere else, mild decreased sensation in right upper extremity  Psychiatry: Appropriate affect. Not agitated. Skin: Warm, dry with normal turgor. No rash    Labs/imaging/EKG:  CBC:   Recent Labs     08/27/20  1544   WBC 17.3*   HGB 13.3   *     BMP:    Recent Labs     08/27/20  1544      K 3.4*      CO2 23   BUN 21*   CREATININE 1.4*   GLUCOSE 278*     Hepatic: No results for input(s): AST, ALT, ALB, BILITOT, ALKPHOS in the last 72 hours. Ct Head Wo Contrast    Result Date: 8/27/2020  EXAMINATION: CT OF THE HEAD WITHOUT CONTRAST  8/27/2020 3:37 pm TECHNIQUE: CT of the head was performed without the administration of intravenous contrast. Dose modulation, iterative reconstruction, and/or weight based adjustment of the mA/kV was utilized to reduce the radiation dose to as low as reasonably achievable. COMPARISON: None HISTORY: ORDERING SYSTEM PROVIDED HISTORY: RUE ataxia, paresthesia TECHNOLOGIST PROVIDED HISTORY: Reason for exam:->RUE ataxia, paresthesia Has a \"code stroke\" or \"stroke alert\" been called? ->Yes Is the patient pregnant?->No Reason for Exam: RUE ataxia, paresthesia Acuity: Unknown Type of Exam: Unknown FINDINGS: BRAIN/VENTRICLES: The ventricular system is within normal limits. No evidence of mass effect or midline shift. There are foci of abnormal low-attenuation within periventricular/subcortical white matter. Tiny nonspecific area of low attenuation approaching density of CSF identified in the posterior left central sylvian region near the atrium of the left lateral ventricle (images 26-30). Finding may represent small focal area of remote insult. No other area of abnormal attenuation of brain parenchyma is identified. No abnormal extra-axial fluid collections are identified. ORBITS: The visualized portion of the orbits demonstrate no acute abnormality. SINUSES: The visualized paranasal sinuses demonstrate no acute abnormality. There is opacification of several left mastoid air cells. Right mastoid air cells are well aerated. Small filling defect within the left external auditory canal likely related to retained cerumen. SOFT TISSUES/SKULL:  No acute abnormality of the visualized skull or soft tissues. There is a focal area of nonspecific induration of subcutaneous soft tissues in the left parietal region (image 42). Finding could be related to prior trauma. Foci of abnormal low-attenuation within periventricular/subcortical white matter. Finding could be on the basis of changes of mild ischemic leukoencephalopathy. However, in a patient in this age group without significant atherosclerotic calcification of intracranial vasculature, other etiologies including changes of demyelinating process also in the differential diagnosis. Clinical correlation would be helpful. Critical results were called by Paula Figueroa. Alvin Beckwith MD to Dr. Tani Roger on 8/27/2020 at 16:02.      Xr Chest Portable    Result Date: 8/27/2020  EXAMINATION: ONE XRAY VIEW OF THE CHEST 8/27/2020 4:07 pm COMPARISON: 04/25/2020 HISTORY: ORDERING SYSTEM PROVIDED HISTORY: weakness TECHNOLOGIST PROVIDED HISTORY: Reason for exam:->weakness Reason for Exam: poss stroke Acuity: Unknown Type of Exam: Unknown FINDINGS: The lungs are without acute focal process. There is no effusion or pneumothorax. The cardiomediastinal silhouette is stable. The osseous structures are stable. No acute process. Cta Head Neck W Contrast    Result Date: 8/27/2020  EXAMINATION: CTA OF THE HEAD AND NECK WITH CONTRAST 8/27/2020 3:37 pm: TECHNIQUE: CTA of the head and neck was performed with the administration of intravenous contrast. Multiplanar reformatted images are provided for review. MIP images are provided for review. Stenosis of the internal carotid arteries measured using NASCET criteria. Dose modulation, iterative reconstruction, and/or weight based adjustment of the mA/kV was utilized to reduce the radiation dose to as low as reasonably achievable. COMPARISON: None. HISTORY: ORDERING SYSTEM PROVIDED HISTORY: RUE ataxia, paresthesia TECHNOLOGIST PROVIDED HISTORY: Reason for exam:->RUE ataxia, paresthesia Reason for Exam: RUE ataxia, paresthesia Acuity: Unknown Type of Exam: Unknown FINDINGS: CTA NECK: AORTIC ARCH/ARCH VESSELS: No dissection or arterial injury. No significant stenosis of the brachiocephalic or subclavian arteries. CAROTID ARTERIES: No dissection, arterial injury, or hemodynamically significant stenosis by NASCET criteria. VERTEBRAL ARTERIES: No dissection, arterial injury, or significant stenosis. SOFT TISSUES: The lung apices are clear. No cervical or superior mediastinal lymphadenopathy. The larynx and pharynx are unremarkable. No acute abnormality of the salivary and thyroid glands. Multinodular thyroid gland with the largest nodule measuring 1.6 cm in diameter in the left lobe. BONES: No acute osseous abnormality. Left mastoid effusion.  CTA HEAD: ANTERIOR CIRCULATION: No significant stenosis of the intracranial internal carotid, anterior cerebral, or middle cerebral arteries. No aneurysm. POSTERIOR CIRCULATION: No significant stenosis of the vertebral, basilar, or posterior cerebral arteries. No aneurysm. OTHER: No dural venous sinus thrombosis on this non-dedicated study. BRAIN: No mass effect or midline shift. No extra-axial fluid collection. The gray-white differentiation is maintained. 1. No acute arterial abnormality or hemodynamically significant arterial stenosis in the head or neck. 2. Multinodular thyroid gland with a 1.6 cm dominant left thyroid nodule. Nonemergent follow-up outpatient thyroid ultrasound is recommended for further evaluation. Discussed with Patient, Family and ER provider      Assessment:  Active Problems:    Type 2 diabetes mellitus, with long-term current use of insulin (HCC)    Essential hypertension    History of migraine    Right sided numbness    Tobacco dependence  Resolved Problems:    * No resolved hospital problems.  *      Plan:  Right-sided numbness and weakness  -Consult neurology  -CT head revealed foci of normal low attenuation within periventricular/subcortical white matter  -CTA head and neck negative for any hemodynamically significant arterial stenosis, incidental finding of multinodular thyroid gland with a 1.6 cm dominant left thyroid nodule  -Echo from 4/28/2020 revealed EF of 89% and diastolic dysfunction 2  -Check MRI, TSH, lipid panel, A1c      Uncontrolled essential hypertension  -Denies missing any doses of home meds  -Continue Lasix 40 mg twice daily, lisinopril 40 mg daily, Aldactone 50 mg daily, propranolol 60 mg daily, PRN IV labetalol, continue to monitor  -Check TSH        Uncontrolled insulin-dependent diabetes mellitus type 2 with nephropathy  -Continue Lantus 15 units nightly, sliding scale insulin moderate, continue to monitor  -Check A1c      Chronic kidney disease stage III  -Stable, follows with Dr. Janell Alicia outpatient        Multinodular thyroid gland  -Follow-up with PCP for outpatient thyroid ultrasound      Tobacco dependence  -NicoDerm, advised to quit      Chronic diastolic congestive heart failure  -Stable, CPM, CTM    History of migraine      Activities: Up with assist  Prophylaxis: lovenox  Code status: Full    ==========================================================          Thank you No primary care provider on file. for the opportunity to be involved in this patient's care.  If you have any questions or concerns please feel free to contact me at [phone].  -----------------------------  Claude Punter, MD  [facility]

## 2020-08-28 ENCOUNTER — APPOINTMENT (OUTPATIENT)
Dept: MRI IMAGING | Age: 45
DRG: 045 | End: 2020-08-28
Payer: COMMERCIAL

## 2020-08-28 LAB
AMPHETAMINE SCREEN, URINE: NORMAL
ANION GAP SERPL CALCULATED.3IONS-SCNC: 10 MMOL/L (ref 3–16)
BARBITURATE SCREEN URINE: NORMAL
BENZODIAZEPINE SCREEN, URINE: NORMAL
BILIRUBIN URINE: NEGATIVE
BLOOD, URINE: ABNORMAL
BUN BLDV-MCNC: 15 MG/DL (ref 7–20)
CALCIUM SERPL-MCNC: 8.6 MG/DL (ref 8.3–10.6)
CANNABINOID SCREEN URINE: NORMAL
CHLORIDE BLD-SCNC: 105 MMOL/L (ref 99–110)
CHOLESTEROL, TOTAL: 219 MG/DL (ref 0–199)
CLARITY: CLEAR
CO2: 24 MMOL/L (ref 21–32)
COCAINE METABOLITE SCREEN URINE: NORMAL
COLOR: YELLOW
CREAT SERPL-MCNC: 1.3 MG/DL (ref 0.6–1.1)
EKG ATRIAL RATE: 91 BPM
EKG DIAGNOSIS: NORMAL
EKG P AXIS: 37 DEGREES
EKG P-R INTERVAL: 124 MS
EKG Q-T INTERVAL: 366 MS
EKG QRS DURATION: 78 MS
EKG QTC CALCULATION (BAZETT): 450 MS
EKG R AXIS: 28 DEGREES
EKG T AXIS: 103 DEGREES
EKG VENTRICULAR RATE: 91 BPM
EPITHELIAL CELLS, UA: 2 /HPF (ref 0–5)
ESTIMATED AVERAGE GLUCOSE: 214.5 MG/DL
FOLATE: 11.7 NG/ML (ref 4.78–24.2)
GFR AFRICAN AMERICAN: 54
GFR NON-AFRICAN AMERICAN: 44
GLUCOSE BLD-MCNC: 115 MG/DL (ref 70–99)
GLUCOSE BLD-MCNC: 183 MG/DL (ref 70–99)
GLUCOSE BLD-MCNC: 261 MG/DL (ref 70–99)
GLUCOSE BLD-MCNC: 296 MG/DL (ref 70–99)
GLUCOSE BLD-MCNC: 97 MG/DL (ref 70–99)
GLUCOSE URINE: 250 MG/DL
HBA1C MFR BLD: 9.1 %
HCT VFR BLD CALC: 36.1 % (ref 36–48)
HDLC SERPL-MCNC: 37 MG/DL (ref 40–60)
HEMOGLOBIN: 12.3 G/DL (ref 12–16)
HYALINE CASTS: 5 /LPF (ref 0–8)
KETONES, URINE: NEGATIVE MG/DL
LDL CHOLESTEROL CALCULATED: 137 MG/DL
LEUKOCYTE ESTERASE, URINE: NEGATIVE
Lab: NORMAL
MCH RBC QN AUTO: 29.3 PG (ref 26–34)
MCHC RBC AUTO-ENTMCNC: 34 G/DL (ref 31–36)
MCV RBC AUTO: 86.1 FL (ref 80–100)
METHADONE SCREEN, URINE: NORMAL
MICROSCOPIC EXAMINATION: YES
NITRITE, URINE: NEGATIVE
OPIATE SCREEN URINE: NORMAL
OXYCODONE URINE: NORMAL
PDW BLD-RTO: 15.9 % (ref 12.4–15.4)
PERFORMED ON: ABNORMAL
PERFORMED ON: NORMAL
PH UA: 6.5
PH UA: 6.5 (ref 5–8)
PHENCYCLIDINE SCREEN URINE: NORMAL
PLATELET # BLD: 421 K/UL (ref 135–450)
PMV BLD AUTO: 8 FL (ref 5–10.5)
POTASSIUM REFLEX MAGNESIUM: 3.6 MMOL/L (ref 3.5–5.1)
PROPOXYPHENE SCREEN: NORMAL
PROTEIN UA: >300 MG/DL
RBC # BLD: 4.19 M/UL (ref 4–5.2)
RBC UA: 7 /HPF (ref 0–4)
SODIUM BLD-SCNC: 139 MMOL/L (ref 136–145)
SPECIFIC GRAVITY UA: 1.02 (ref 1–1.03)
TRIGL SERPL-MCNC: 223 MG/DL (ref 0–150)
TROPONIN: 0.04 NG/ML
TROPONIN: 0.05 NG/ML
TSH REFLEX: 1.83 UIU/ML (ref 0.27–4.2)
URINE REFLEX TO CULTURE: ABNORMAL
URINE TYPE: ABNORMAL
UROBILINOGEN, URINE: 0.2 E.U./DL
VITAMIN B-12: 416 PG/ML (ref 211–911)
VLDLC SERPL CALC-MCNC: 45 MG/DL
WBC # BLD: 16.3 K/UL (ref 4–11)
WBC UA: 2 /HPF (ref 0–5)

## 2020-08-28 PROCEDURE — 97165 OT EVAL LOW COMPLEX 30 MIN: CPT

## 2020-08-28 PROCEDURE — 81001 URINALYSIS AUTO W/SCOPE: CPT

## 2020-08-28 PROCEDURE — 99223 1ST HOSP IP/OBS HIGH 75: CPT | Performed by: PSYCHIATRY & NEUROLOGY

## 2020-08-28 PROCEDURE — 70551 MRI BRAIN STEM W/O DYE: CPT

## 2020-08-28 PROCEDURE — 80048 BASIC METABOLIC PNL TOTAL CA: CPT

## 2020-08-28 PROCEDURE — 6370000000 HC RX 637 (ALT 250 FOR IP): Performed by: FAMILY MEDICINE

## 2020-08-28 PROCEDURE — 93010 ELECTROCARDIOGRAM REPORT: CPT | Performed by: INTERNAL MEDICINE

## 2020-08-28 PROCEDURE — 2580000003 HC RX 258: Performed by: FAMILY MEDICINE

## 2020-08-28 PROCEDURE — 6370000000 HC RX 637 (ALT 250 FOR IP): Performed by: PHYSICIAN ASSISTANT

## 2020-08-28 PROCEDURE — 2060000000 HC ICU INTERMEDIATE R&B

## 2020-08-28 PROCEDURE — 6370000000 HC RX 637 (ALT 250 FOR IP): Performed by: HOSPITALIST

## 2020-08-28 PROCEDURE — 92610 EVALUATE SWALLOWING FUNCTION: CPT

## 2020-08-28 PROCEDURE — 36415 COLL VENOUS BLD VENIPUNCTURE: CPT

## 2020-08-28 PROCEDURE — 85027 COMPLETE CBC AUTOMATED: CPT

## 2020-08-28 PROCEDURE — 82746 ASSAY OF FOLIC ACID SERUM: CPT

## 2020-08-28 PROCEDURE — 92526 ORAL FUNCTION THERAPY: CPT

## 2020-08-28 PROCEDURE — 84484 ASSAY OF TROPONIN QUANT: CPT

## 2020-08-28 PROCEDURE — 97162 PT EVAL MOD COMPLEX 30 MIN: CPT

## 2020-08-28 PROCEDURE — 80307 DRUG TEST PRSMV CHEM ANLYZR: CPT

## 2020-08-28 PROCEDURE — 97530 THERAPEUTIC ACTIVITIES: CPT

## 2020-08-28 PROCEDURE — 80061 LIPID PANEL: CPT

## 2020-08-28 PROCEDURE — 82607 VITAMIN B-12: CPT

## 2020-08-28 PROCEDURE — 2500000003 HC RX 250 WO HCPCS: Performed by: FAMILY MEDICINE

## 2020-08-28 PROCEDURE — 94760 N-INVAS EAR/PLS OXIMETRY 1: CPT

## 2020-08-28 RX ORDER — PREGABALIN 75 MG/1
75 CAPSULE ORAL ONCE
Status: COMPLETED | OUTPATIENT
Start: 2020-08-28 | End: 2020-08-28

## 2020-08-28 RX ORDER — ACETAMINOPHEN 325 MG/1
650 TABLET ORAL EVERY 6 HOURS PRN
Status: DISCONTINUED | OUTPATIENT
Start: 2020-08-28 | End: 2020-08-31 | Stop reason: HOSPADM

## 2020-08-28 RX ORDER — LORAZEPAM 1 MG/1
1 TABLET ORAL ONCE
Status: COMPLETED | OUTPATIENT
Start: 2020-08-28 | End: 2020-08-28

## 2020-08-28 RX ORDER — GABAPENTIN 300 MG/1
300 CAPSULE ORAL ONCE
Status: DISCONTINUED | OUTPATIENT
Start: 2020-08-28 | End: 2020-08-28

## 2020-08-28 RX ADMIN — Medication 10 ML: at 20:26

## 2020-08-28 RX ADMIN — LISINOPRIL 40 MG: 20 TABLET ORAL at 09:02

## 2020-08-28 RX ADMIN — DESMOPRESSIN ACETATE 40 MG: 0.2 TABLET ORAL at 20:26

## 2020-08-28 RX ADMIN — PREGABALIN 75 MG: 75 CAPSULE ORAL at 23:23

## 2020-08-28 RX ADMIN — LABETALOL HYDROCHLORIDE 10 MG: 5 INJECTION, SOLUTION INTRAVENOUS at 05:36

## 2020-08-28 RX ADMIN — PROPRANOLOL HYDROCHLORIDE 60 MG: 60 CAPSULE, EXTENDED RELEASE ORAL at 09:02

## 2020-08-28 RX ADMIN — LORAZEPAM 1 MG: 1 TABLET ORAL at 10:54

## 2020-08-28 RX ADMIN — INSULIN LISPRO 3 UNITS: 100 INJECTION, SOLUTION INTRAVENOUS; SUBCUTANEOUS at 21:58

## 2020-08-28 RX ADMIN — FUROSEMIDE 40 MG: 40 TABLET ORAL at 17:51

## 2020-08-28 RX ADMIN — SPIRONOLACTONE 50 MG: 25 TABLET ORAL at 09:02

## 2020-08-28 RX ADMIN — ACETAMINOPHEN 650 MG: 325 TABLET ORAL at 17:51

## 2020-08-28 RX ADMIN — LABETALOL HYDROCHLORIDE 10 MG: 5 INJECTION, SOLUTION INTRAVENOUS at 20:35

## 2020-08-28 RX ADMIN — Medication 10 ML: at 09:05

## 2020-08-28 RX ADMIN — INSULIN LISPRO 6 UNITS: 100 INJECTION, SOLUTION INTRAVENOUS; SUBCUTANEOUS at 17:52

## 2020-08-28 RX ADMIN — FUROSEMIDE 40 MG: 40 TABLET ORAL at 09:02

## 2020-08-28 RX ADMIN — ACETAMINOPHEN 650 MG: 325 TABLET ORAL at 05:31

## 2020-08-28 RX ADMIN — LABETALOL HYDROCHLORIDE 10 MG: 5 INJECTION, SOLUTION INTRAVENOUS at 16:30

## 2020-08-28 RX ADMIN — INSULIN LISPRO 2 UNITS: 100 INJECTION, SOLUTION INTRAVENOUS; SUBCUTANEOUS at 12:42

## 2020-08-28 ASSESSMENT — PAIN DESCRIPTION - PAIN TYPE
TYPE: CHRONIC PAIN
TYPE: NEUROPATHIC PAIN
TYPE: NEUROPATHIC PAIN
TYPE: CHRONIC PAIN
TYPE: NEUROPATHIC PAIN
TYPE: CHRONIC PAIN

## 2020-08-28 ASSESSMENT — PAIN DESCRIPTION - FREQUENCY
FREQUENCY: CONTINUOUS

## 2020-08-28 ASSESSMENT — PAIN DESCRIPTION - LOCATION
LOCATION: FOOT
LOCATION: BACK
LOCATION: FOOT
LOCATION: BACK
LOCATION: BACK
LOCATION: FOOT
LOCATION: FOOT

## 2020-08-28 ASSESSMENT — PAIN DESCRIPTION - DESCRIPTORS
DESCRIPTORS: PINS AND NEEDLES
DESCRIPTORS: PINS AND NEEDLES
DESCRIPTORS: ACHING
DESCRIPTORS: ACHING
DESCRIPTORS: BURNING
DESCRIPTORS: PINS AND NEEDLES

## 2020-08-28 ASSESSMENT — PAIN SCALES - GENERAL
PAINLEVEL_OUTOF10: 7
PAINLEVEL_OUTOF10: 6
PAINLEVEL_OUTOF10: 5
PAINLEVEL_OUTOF10: 5
PAINLEVEL_OUTOF10: 6
PAINLEVEL_OUTOF10: 0
PAINLEVEL_OUTOF10: 4
PAINLEVEL_OUTOF10: 6

## 2020-08-28 ASSESSMENT — PAIN DESCRIPTION - PROGRESSION
CLINICAL_PROGRESSION: GRADUALLY IMPROVING
CLINICAL_PROGRESSION: NOT CHANGED
CLINICAL_PROGRESSION: GRADUALLY WORSENING
CLINICAL_PROGRESSION: GRADUALLY IMPROVING
CLINICAL_PROGRESSION: NOT CHANGED

## 2020-08-28 ASSESSMENT — PAIN DESCRIPTION - ONSET
ONSET: ON-GOING

## 2020-08-28 ASSESSMENT — PAIN DESCRIPTION - ORIENTATION
ORIENTATION: RIGHT;LEFT
ORIENTATION: LOWER
ORIENTATION: RIGHT;LEFT
ORIENTATION: LOWER

## 2020-08-28 ASSESSMENT — PAIN - FUNCTIONAL ASSESSMENT
PAIN_FUNCTIONAL_ASSESSMENT: ACTIVITIES ARE NOT PREVENTED

## 2020-08-28 NOTE — PROGRESS NOTES
Pt admitted to room 3371, able to ambulate into bed with walker. Pt A/Ox4 with no signs of distress noted. NIHSS 4, per patient sensation in R. Side has returned almost completely, but remains weak. Pt updated on plan of care, admission completed, home medications reviewed. No needs at this time. Fall precautions in place, call light in reach, will continue to monitor.

## 2020-08-28 NOTE — PROGRESS NOTES
Dr. Fredy Montanez with [facility] radiology called with MRI results-- small acute infarct L side. Per MRI- \"Acute infarction in the posterior limb of the left internal capsule.  \" Sami Lee RN notified and is to [facility] MD.

## 2020-08-28 NOTE — PROGRESS NOTES
deficits/impairments, stabl presentation  Assistance / Modification: CGA transfers, mobility, SBA socks  OT Education: OT Role;Plan of Care  Patient Education: OT nino, d/c recommendation. Pt verbalized understanding  Barriers to Learning: Vision  REQUIRES OT FOLLOW UP: Yes  Activity Tolerance  Activity Tolerance: Patient Tolerated treatment well  Safety Devices  Safety Devices in place: Yes  Type of devices: All fall risk precautions in place;Call light within reach; Left in chair;Nurse notified; Chair alarm in place;Gait belt;Patient at risk for falls           Patient Diagnosis(es): The primary encounter diagnosis was Cerebrovascular accident (CVA), unspecified mechanism ([facility] [address] A diagnosis of Hypertension, unspecified type was also pertinent to this visit. has a past medical history of Diabetes mellitus out of control ([facility] [address] Diabetes mellitus, type II ([facility] [address] Generalized headaches, Infertility, Insomnia, Migraine headache, Mixed hyperlipidemia, Otitis media, Pelvic abscess in female, and Pneumonia. has a past surgical history that includes Cervix surgery. Treatment Diagnosis: Decreased ADL/IADL status, functional mobility, FM/GM control, coordination associated with R side weakness, unemployed, doesn't drive d/t poor vision. Restrictions  Restrictions/Precautions  Restrictions/Precautions: Fall Risk, Up as Tolerated(high fall risk)  Required Braces or Orthoses?: No  Position Activity Restriction  Other position/activity restrictions: Pt admitted with s/s of CVA. Pt with recent history of bilateral retinal detachment during surgery to drain blood from hemorrhages in eyes. She has a past medical history of insulin-dependent diabetes mellitus, essential hypertension, diabetic nephropathy, tobacco de dependency, migraine headache but is not have any migraines currently.   She has diabetic neuropathy in her bilateral lower extremities    Subjective   General  Chart Reviewed: Yes  Patient assessed for rehabilitation services?: Yes  Family / Caregiver Present: No  Referring Practitioner: Sameera Gomes MD , for d/c planning  Diagnosis: R side weakness  Subjective  Subjective: Pt supine in bed soundly sleeping. Able to be awakened and agreeable to OT eval. PT reports 6/10 back pain. RN aware. Patient Currently in Pain: Yes  Pain Assessment  Pain Assessment: 0-10  Pain Level: 5  Pain Type: Neuropathic pain  Pain Location: Foot  Pain Orientation: Right;Left  Pain Descriptors: Pins and needles  Pain Frequency: Continuous  Pain Onset: On-going  Clinical Progression: Not changed  Functional Pain Assessment: Activities are not prevented  Patient Currently in Pain: Yes  O2 Device: None (Room air)  Social/Functional History  Social/Functional History  Lives With: Family  Type of Home: (Claim[employer])  Home Layout: One level  Home Access: Level entry  Bathroom Shower/Tub: Walk-in shower, Shower chair with back  Bathroom Toilet: Standard  Bathroom Equipment: Grab bars in shower, Shower chair  Home Equipment: Rolling walker  ADL Assistance: Needs assistance  Ambulation Assistance: Independent  Transfer Assistance: Independent  Active : No  Occupation: Unemployed  Type of occupation: Unable to work d/t hemorrhage in B eyes  Additional Comments: 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 Vision significantly impaired.  Uses magnifying glass to read.)  Hearing: Within functional limits    Orientation  Overall Orientation Status: Within Normal Limits  Observation/Palpation  Posture: Good  Balance  Sitting Balance: Stand by assistance(dynamic)  Standing Balance: Contact guard assistance(wRW)  Standing Balance  Time: 3 min  Activity: functional mobility ~100 ft  Comment: Pt's knee buckled 1 time  Functional Mobility  Functional - Mobility Device: Rolling Walker  Activity: Other  Assist Level: Contact guard assistance  ADL  LE Dressing: Stand by assistance(don/doff socks)  Additional Comments: Pt declined further ADLs. Tone RUE  RUE Tone: Hypotonic  Tone Description: decreased control R UE  Tone LUE  LUE Tone: Normotonic  Coordination  Movements Are Fluid And Coordinated: No  Coordination and Movement description: Fine motor impairments;Decreased accuracy;Gross motor impairments;Right UE;Decreased speed  Quality of Movement Other  Comment: Able to maintain grasp on small deodorant bottle, impaired accuracy finger to nose test, finger-thumb opposition     Bed mobility  Rolling to Left: Independent  Supine to Sit: Modified independent(HOB elevated)  Scooting: Modified independent  Transfers  Stand Step Transfers: Contact guard assistance(w/RW)  Sit to stand: Contact guard assistance  Stand to sit: Contact guard assistance  Vision - Basic Assessment  Prior Vision: Other (add comment)(magnifying glass to read)  Visual History: Other (add comment)(hemorrhage B eyes, retinal detachment)  Patient Visual Report:  Other (add comment)(Unable to see details on TV; uses magnifying glass to read)  Cognition  Overall Cognitive Status: WNL  Perception  Overall Perceptual Status: WFL     Sensation  Overall Sensation Status: WFL(intact to light touch but pt does report stocking distribution neuropathy where LT is lessened from ankles down; reports numbness/tingling shoulder to hand, but able to ID light touch)        LUE AROM (degrees)  LUE AROM : WNL  Left Hand AROM (degrees)  Left Hand AROM: WNL  RUE AROM (degrees)  RUE AROM : WFL  RUE General AROM: Decreased control of L UE, increased effort with mobility  Right Hand AROM (degrees)  Right Hand AROM: WFL  LUE Strength  Gross LUE Strength: WNL(5/5 elbow, shoulder)  RUE Strength  Gross RUE Strength: WFL(elbow, shoulder grossly 4/5)  R Hand General: 4+/5     Hand Dominance  Hand Dominance: Left             Plan   Plan  Times per week: 5-7  Times per day: Daily  Current Treatment Recommendations: Self-Care / ADL, Endurance Training, Neuromuscular Re-education, ROM, Strengthening, Patient/Caregiver Education & Training    AM-PAC Score        AM-PAC Inpatient Daily Activity Raw Score: 18 (08/28/20 1223)  AM-PAC Inpatient ADL T-Scale Score : 38.66 (08/28/20 1223)  ADL Inpatient CMS 0-100% Score: 46.65 (08/28/20 1223)  ADL Inpatient CMS G-Code Modifier : CK (08/28/20 1223)    Goals  Short term goals  Time Frame for Short term goals: Discharge  Short term goal 1: SBA for functional transfers to ADL surfaces w/RW  Short term goal 2: SBA for functional mobility for ADL/IADL activity w/RW  Short term goal 3: SBA for UB bathing/dressing  Short term goal 4: SBA for LB bathing/dressing  Short term goal 5: Mod I for HEP for R UE GM/FM coordination and strength for ADLs       Therapy Time   Individual Concurrent Group Co-treatment   Time In 1019         Time Out 1052         Minutes 33               Timed Code Treatment Minutes:  18 Minutes    Total Treatment Minutes:  [address] [id number], OTR/L, [id number]

## 2020-08-28 NOTE — CONSULTS
In patient Neurology consult        [facility]      MD Janee Guadarrama  1975    Date of Service: 8/28/2020    Referring Physician: Sherran Saint, MD      Reason for the consult and CC: Acute right-sided weakness and possible new stroke. HPI:   The patient is a 39y.o.  years old female with history of diabetes with diabetic retinopathy and neuropathy, hypertension, smoking and hyperlipidemia who was admitted to the hospital yesterday with acute right-sided weakness. Symptoms started few hours prior to admission. She woke up with the weakness in the right side. She was not able to move her right side or use her right hand. Degree was severe. Duration was persistent. No triggers. No other associated symptom except for low sugar down to 50 when she checked it at home. No dizziness, lightheadedness, headache, chest pain, dysphagia or dysarthria. No other relieving or aggravating factors. She was unable to function at home and she decided to come to the hospital.  Initial work-up with CT of the head showed no acute stroke. CTA showed no large vessel occlusion. She was admitted to the hospital.  The patient was not taking any aspirin or Plavix at home due to history of retinopathy and retinal bleed in the past.  The patient continues to be weak on the right side. No other new symptoms. Other review of system was unremarkable.       Family History   Problem Relation Age of Onset    Diabetes Mother     Diabetes Father     High Blood Pressure Father     Cancer Father         colon    Diabetes Sister     Diabetes Paternal Grandmother      Past Surgical History:   Procedure Laterality Date    CERVIX SURGERY      laser tx for dysplasia;1992        Past Medical History:   Diagnosis Date    Diabetes mellitus out of control ([address]     Diabetes mellitus, type II ([address]     2005    Generalized headaches     Infertility     Insomnia     chronic vs lack of time spent to sleep    Migraine headache 11/9/2011    Mixed hyperlipidemia     Otitis media     h/o recurrent    Pelvic abscess in female 10/5/2013    Pneumonia     2004 approx. Social History     Tobacco Use    Smoking status: Current Every Day Smoker     Packs/day: 1.00     Types: Cigarettes    Smokeless tobacco: Never Used   Substance Use Topics    Alcohol use: No     Comment: Very Rare    Drug use: No     Allergies   Allergen Reactions    Amoxicillin Itching and Hives     Tolerates cephalosporins  Patient tolerating cefazolin (ANCEF) as of October 11, 2018    Levofloxacin Anaphylaxis    Levofloxacin Anaphylaxis    Vancomycin Shortness Of Breath, Hives and Anaphylaxis    Tape [Adhesive Tape] Other (See Comments)     Paper tape turns skin bright red. Plastic tape okay.       Current Facility-Administered Medications   Medication Dose Route Frequency Provider Last Rate Last Dose    acetaminophen (TYLENOL) tablet 650 mg  650 mg Oral Q6H PRN Donna Go MD   650 mg at 08/28/20 0531    labetalol (NORMODYNE;TRANDATE) injection 10 mg  10 mg Intravenous Q4H PRN Derian Valero MD   10 mg at 08/28/20 0536    furosemide (LASIX) tablet 40 mg  40 mg Oral BID Derian Valero MD   40 mg at 08/28/20 0902    insulin glargine (LANTUS;BASAGLAR) injection pen 15 Units  15 Units Subcutaneous Nightly Derian Valero MD   15 Units at 08/27/20 2224    lisinopril (PRINIVIL;ZESTRIL) tablet 40 mg  40 mg Oral Daily Derian Valero MD   40 mg at 08/28/20 0902    propranolol (INDERAL LA) extended release capsule 60 mg  60 mg Oral Daily Derian Valero MD   60 mg at 08/28/20 0902    spironolactone (ALDACTONE) tablet 50 mg  50 mg Oral Daily Derian Valero MD   50 mg at 08/28/20 0902    sodium chloride flush 0.9 % injection 10 mL  10 mL Intravenous 2 times per day Derian Valero MD   10 mL at 08/28/20 0905    sodium chloride flush 0.9 % injection 10 mL  10 mL Intravenous PRN Jodie Jennifer Allison MD        polyethylene glycol [facility]) packet 17 g  17 g Oral Daily PRN Destiny Gabriel MD        promethazine (PHENERGAN) tablet 12.5 mg  12.5 mg Oral Q6H PRN Destiny Gabriel MD        Or    ondansetron [facility]) injection 4 mg  4 mg Intravenous Q6H PRN Destiny Gabriel MD        enoxaparin (LOVENOX) injection 40 mg  40 mg Subcutaneous Daily Destiny Gabriel MD        glucose (GLUTOSE) 40 % oral gel 15 g  15 g Oral PRN Destiny Gabriel MD        dextrose 50 % IV solution  12.5 g Intravenous PRN Destiny Gabriel MD        glucagon (rDNA) injection 1 mg  1 mg Intramuscular PRN Destiny Gabriel MD        dextrose 5 % solution  100 mL/hr Intravenous PRN Destiny Gabriel MD        aspirin EC tablet 81 mg  81 mg Oral Daily Destiny Gabriel MD        Or   Atha Herminio aspirin suppository 300 mg  300 mg Rectal Daily Destiny Gabriel MD        atorvastatin (LIPITOR) tablet 40 mg  40 mg Oral Nightly Destiny Gabriel MD   40 mg at 08/27/20 2222    insulin lispro (1 Unit Dial) 0-12 Units  0-12 Units Subcutaneous TID WC Destiny Gabriel MD        insulin lispro (1 Unit Dial) 0-6 Units  0-6 Units Subcutaneous Nightly Destiny Gabriel MD   1 Units at 08/27/20 2223    nicotine (NICODERM CQ) 21 MG/24HR 1 patch  1 patch Transdermal Daily Destiny Gabriel MD   1 patch at 08/28/20 0904       ROS : A 10-14 system review of constitutional, cardiovascular, respiratory, eyes, musculoskeletal, endocrine, GI, ENT, skin, hematological, genitourinary, psychiatric and neurologic systems was obtained and updated today and is unremarkable except as mentioned in my HPI      Exam:     Constitutional:   Vitals:    08/28/20 0000 08/28/20 0425 08/28/20 0702 08/28/20 0800   BP: (!) 190/90 (!) 185/78  (!) 172/97   Pulse: 85 83  84   Resp: 16 16 16 16   Temp: 96.3 °F (35.7 °C) 96.8 °F (36 °C)  96.9 °F (36.1 °C)   TempSrc: Temporal Temporal  Temporal   SpO2: 97% 98% 98% 98%   Weight: Height:           General appearance:  Normal development and appear in no acute distress. Eye: No icterus. Fundus: Funduscopic examination cannot be performed due to COVID19 restrictions and precautions. Neck: supple  Cardiovascular: No lower leg edema with good pulsation. Mental Status:   Oriented to person, place, problem, and time. Memory: Aware of recent and remote event. Good immediate recall. Intact remote memory  Normal attention span and concentration. Language: intact naming, repeating and fluency   Good fund of Knowledge. Aware of current events and vocabulary   Cranial Nerves:   II: Visual fields: Poor visual acuity. Pupils: equal, round, reactive to light  III,IV,VI: Extra Ocular Movements are intact. No nystagmus  V: Facial sensation is intact   VII: Facial strength and movements: intact and symmetric  VIII: Hearing: Intact to finger rub bilaterally  IX: Palate elevation is symmetric  XI: Shoulder shrug is intact  XII: Tongue movements are normal  Musculoskeletal: No weakness in the left side. Right-sided weakness 3/5 more with hand extension and hip flexion. Tone: Normal tone. Reflexes: 2+ in the arms 1+ in the legs. Planters: flexor bilaterally. Coordination: no pronator drift, no dysmetria with FNF in upper extremities. Normal REM. Sensation: normal to all modalities in both arms and diminished vibration in her distal legs. Gait/Posture: Not tested due to weakness and poor visual acuity.     Data:  LABS:   Lab Results   Component Value Date     08/28/2020    K 3.6 08/28/2020     08/28/2020    CO2 24 08/28/2020    BUN 15 08/28/2020    CREATININE 1.3 08/28/2020    GFRAA 54 08/28/2020    GFRAA >60 11/09/2011    LABGLOM 44 08/28/2020    GLUCOSE 115 08/28/2020    PHOS 3.7 08/20/2020    MG 1.70 08/27/2020    CALCIUM 8.6 08/28/2020     Lab Results   Component Value Date    WBC 16.3 08/28/2020    RBC 4.19 08/28/2020    HGB 12.3 08/28/2020    HCT 36.1 08/28/2020    MCV 86.1 08/28/2020    RDW 15.9 08/28/2020     08/28/2020     Lab Results   Component Value Date    INR 0.91 08/27/2020    PROTIME 10.6 08/27/2020       Neuroimaging were independently reviewed by myself and discussed results with the patient  Reviewed notes from different physicians  Reviewed lab and blood testing    Impression:  Acute right-sided weakness likely secondary to new ischemic left hemispheric CVA. Could be thromboembolic versus cardioembolic. Hypertension, not controlled  Diabetes, not controlled with diabetic retinopathy and polyneuropathy. Last A1c in April was 11.7  CAD  Hyperlipidemia  Smoker      Recommendation:  MRI brain  Echo  Nicotine patch  PT and OT  Speech evaluation  Telemetry  DVT and GI prophylaxis  The patient is refusing to take aspirin at this point. Will need clearance from ophthalmology since I feel she had a new stroke based on examination  Blood pressure monitor and control  Resume home blood pressure medication  Insulin sliding scale  A1c and lipid panel  Blood sugar monitor and control closely  Long discussion today with the patient regarding risk of strokes including risk of poorly controlled diabetes and hypertension as well as smoking. We will follow. MDM: High complexity. Patient's instructions:  Secondary stroke prevention was discussed with the patient including: Blood pressure and sugar monitor and control, lifestyle adjustments and modification, use of AP therapy, Statin and possible side effect, dietary restriction, risk of recurrence and the importance of frequent walking and exercise. Thank you for referring such patient. If you have any questions regarding my consult note, please don't hesitate to call me. Jerry Oneill MD  [phone]    This dictation was generated by voice recognition computer software.  Although all attempts are made to edit the dictation for accuracy, there may be errors in the  transcription that are not intended

## 2020-08-28 NOTE — PROGRESS NOTES
Physical Therapy    Facility/Department: [address]  Initial Assessment    NAME: Lonny Dennis  : 1975  MRN: [id number]    Date of Service: 2020    Discharge Recommendations:  5-7 sessions per week   PT Equipment Recommendations  Equipment Needed: No  Lonny Dennis scored a 19/24 on the AM-PAC short mobility form. Current research shows that an AM-PAC score of 17 or less is typically not associated with a discharge to the patient's home setting. Based on the patient's AM-PAC score and their current functional mobility deficits, it is recommended that the patient have 5-7 sessions per week of Physical Therapy at d/c to increase the patient's independence. At this time, this patient demonstrates the endurance, and/or tolerance for 3 hours of therapy each day, with a treatment frequency of 5-7x/wk. Please see assessment section for further patient specific details. If patient discharges prior to next session this note will serve as a discharge summary. Please see below for the latest assessment towards goals. Assessment   Body structures, Functions, Activity limitations: Decreased functional mobility ; Decreased ADL status; Decreased strength;Decreased vision/visual deficit; Decreased balance;Decreased sensation;Decreased fine motor control;Decreased coordination  Assessment: Pt presents with right sided hemiplegia and hemisensory deficits worse in right UE than LE. Pt with some mild gross motor and coordination deficits, more in UE than LE again. Pt is highly motivated but has a significant medical history, particularly her vision issues which are somewhat limiting. Recommending further inpatient therapy at this time to facilitate ind with all balance and mobiilty prior to d/c to home.   Treatment Diagnosis: impaired coordination, balance and motor control  Prognosis: Good  Decision Making: Medium Complexity  PT Education: Functional Mobility Training;PT Role;Goals;Plan of Care;Gait Training;Transfer Training  Patient Education: dc recommendations: pt verbalizes understanding  Barriers to Learning: vision  REQUIRES PT FOLLOW UP: Yes  Activity Tolerance  Activity Tolerance: Patient Tolerated treatment well       Patient Diagnosis(es): The primary encounter diagnosis was Cerebrovascular accident (CVA), unspecified mechanism ([facility] [address] A diagnosis of Hypertension, unspecified type was also pertinent to this visit. has a past medical history of Diabetes mellitus out of control ([facility] [address] Diabetes mellitus, type II ([facility] [address] Generalized headaches, Infertility, Insomnia, Migraine headache, Mixed hyperlipidemia, Otitis media, Pelvic abscess in female, and Pneumonia. has a past surgical history that includes Cervix surgery. Restrictions  Restrictions/Precautions  Restrictions/Precautions: Fall Risk, Up as Tolerated  Required Braces or Orthoses?: No  Position Activity Restriction  Other position/activity restrictions: Pt admitted with s/s of CVA. Pt with recent history of bilateral retinal detachment during surgery to drain blood from hemorrhages in eyes. She has a past medical history of insulin-dependent diabetes mellitus, essential hypertension, diabetic nephropathy, tobacco de dependency, migraine headache but is not have any migraines currently.   She has diabetic neuropathy in her bilateral lower extremities  Vision/Hearing  Vision: Impaired  Vision Exceptions: (recent eye surgerys due to detached retina, vision is significantly impaired)  Hearing: Within functional limits     Subjective  General  Chart Reviewed: Yes  Patient assessed for rehabilitation services?: Yes  Response To Previous Treatment: Not applicable  Family / Caregiver Present: No  Follows Commands: Within Functional Limits  General Comment  Comments: Pt sleeping soundly in bed, awakens to verbal and tactile cues  Subjective  Subjective: Pt agreeable to PT eval  Pain Screening  Patient Currently in Pain: Yes  Pain Assessment  Pain assistance  Comment: CGA for steadying, cues for UE placement  Ambulation  Ambulation?: Yes  More Ambulation?: No  Ambulation 1  Surface: level tile  Device: Rolling Walker  Assistance: Contact guard assistance;Minimal assistance  Quality of Gait: Pt with decreased foot clearance and step length bilaterally, decreased heel toe pattern, pt with mild instability on right  Gait Deviations: Decreased step length;Decreased step height  Distance: 100  Comments: Pt requiring CGA for majority of ambulation but had one buckle on right needing min assist to maintain balance  Stairs/Curb  Stairs?: No     Balance  Posture: Good  Sitting - Static: Good  Sitting - Dynamic: Good  Standing - Static: Good  Standing - Dynamic: Fair;+  Comments: Pt able to sit on EOB and don/doff socks and perform reaching tasks across midline with modified ind.         Plan   Plan  Times per week: 5-7x/week  Times per day: Daily  Current Treatment Recommendations: Strengthening, Transfer Training, Neuromuscular Re-education, Patient/Caregiver Education & Training, Balance Training, Gait Training, Home Exercise Program, Safety Education & Training, Stair training, Functional Mobility Training  Safety Devices  Type of devices: Call light within reach, Chair alarm in place, Gait belt, Nurse notified, Left in chair, All fall risk precautions in place  Restraints  Initially in place: No                                                      AM-PAC Score  AM-PAC Inpatient Mobility Raw Score : 19 (08/28/20 1130)  AM-PAC Inpatient T-Scale Score : 45.44 (08/28/20 1130)  Mobility Inpatient CMS 0-100% Score: 41.77 (08/28/20 1130)  Mobility Inpatient CMS G-Code Modifier : CK (08/28/20 1130)          Goals  Short term goals  Time Frame for Short term goals: at discharge:  Short term goal 1: Pt to be modified ind with sit<>stand from various surfaces  Short term goal 2: Pt to be modified ind ambulating 200' with LRAD  Short term goal 3: Pt to perform 1 step with UE support with supv to allow for safe community navigation  Patient Goals   Patient goals : to be able to go home and do what she did before       Therapy Time   Individual Concurrent Group Co-treatment   Time In 1019         Time Out 1052         Minutes 33         Timed Code Treatment Minutes: [address]

## 2020-08-28 NOTE — PROGRESS NOTES
Resting quietly in bed, eyes closed, responded to verbal stimuli. BP elevated, denies headache or dizziness. NIHSS 4. VSS. Assessment completed, see flow charts. No distress noted. yessy

## 2020-08-28 NOTE — PROGRESS NOTES
Hospitalist Progress Note    Patient:  Ki Johnston  Unit/Bed:[id number]-[id number]/3371-02   YOB: 1975       MRN: [id number] Acct: [de-identified]  PCP: No primary care provider on file. Date of Admission: 8/27/2020  --------------------------    Chief Complaint:     Right-sided weakness    Hospital Course:     Ki Johnston is a 39 y.o. female hospitalized on 8/27/2020   *with right-sided weakness, found to have acute CVA on MRI    Assessment:     Acute CVA. MRI showed acute infarction in the posterior limb of the left internal capsule  - neurology following  -No flow-limiting stenosis noted on CTA of the head and neck  Continue aspirin, statin, further antiplatelet per neurology  -PT and OT, inpatient rehab evaluation           Uncontrolled essential hypertension, likely secondary to the stroke  -Continue current blood pressure medication  Permissive hypertension           Uncontrolled insulin-dependent diabetes mellitus type 2 with nephropathy  -Continue Lantus 15 units nightly, sliding scale insulin moderate, continue to monitor  - A1`c  9.1  - Monitor blood sugar        Chronic kidney disease stage III  -Monitor creatinine while inpatient        Multinodular thyroid gland/MRI alluded to incidental finding  -Follow-up with PCP for outpatient thyroid ultrasound        Tobacco dependence  -NicoDerm, advised to quit        Chronic diastolic congestive heart failure  -Stable, CPM, CTM             Code Status: Full Code         DVT prophylaxis:  lovenox     Disposition: ? Inpatient rehab    I discussed my thought processes at length with patient/family and patient understood.  Question and concerns  Addressed      Discussed with RN     ----------------      Subjective:     Patient seen and examined  Overnight events noted  RN and ancillary staff note reviewed    Unchanged right-sided weakness in the upper and lower extremity      Diet: DIET CARB CONTROL; Carb Control: 4 carb choices (60 gms)/meal    OBJECTIVE     Exam:  BP (!) 172/97   Pulse 84   Temp 96.9 °F (36.1 °C) (Temporal)   Resp 16   Ht 5' 7\" (1.702 m)   Wt 191 lb (86.6 kg)   SpO2 98%   BMI 29.91 kg/m²            Gen: Not in distress. Alert. Head: Normocephalic. Atraumatic. Eyes: Conjunctivae/corneas clear. ENT: Oral mucosa moist  Neck: No JVD. No obvious thyromegaly. CVS: Nml S1S2, no murmur  , RRR  Pulmomary: Clear bilaterally. No crackles. No wheezes. Gastrointestinal: Soft, non tender, non distend, . Musculoskeletal: No edema. Warm  Neuro: Positive for right-sided weakness  . Psychiatry: Appropriate affect. Not agitated.         Medications:  Reviewed    Infusion Medications    dextrose       Scheduled Medications    LORazepam  1 mg Oral Once    furosemide  40 mg Oral BID    insulin glargine  15 Units Subcutaneous Nightly    lisinopril  40 mg Oral Daily    propranolol  60 mg Oral Daily    spironolactone  50 mg Oral Daily    sodium chloride flush  10 mL Intravenous 2 times per day    enoxaparin  40 mg Subcutaneous Daily    aspirin  81 mg Oral Daily    Or    aspirin  300 mg Rectal Daily    atorvastatin  40 mg Oral Nightly    insulin lispro  0-12 Units Subcutaneous TID WC    insulin lispro  0-6 Units Subcutaneous Nightly    nicotine  1 patch Transdermal Daily     PRN Meds: acetaminophen, labetalol, sodium chloride flush, polyethylene glycol, promethazine **OR** ondansetron, glucose, dextrose, glucagon (rDNA), dextrose      Intake/Output Summary (Last 24 hours) at 8/28/2020 0833  Last data filed at 8/28/2020 0519  Gross per 24 hour   Intake --   Output 900 ml   Net -900 ml             Labs:   Recent Labs     08/27/20  1544 08/28/20  0502   WBC 17.3* 16.3*   HGB 13.3 12.3   HCT 38.2 36.1   * 421     Recent Labs     08/27/20  1544 08/28/20  0502    139   K 3.4* 3.6    105   CO2 23 24   BUN 21* 15   CREATININE 1.4* 1.3*   CALCIUM 9.0 8.6     Recent Labs     08/27/20  1544   AST 15   ALT 9* BILIDIR <0.2   BILITOT <0.2   ALKPHOS 144*     Recent Labs     08/27/20  1544   INR 0.91     Recent Labs     08/27/20  1544 08/27/20  2354 08/28/20  0502   TROPONINI 0.05* 0.04* 0.05*       Urinalysis:      Lab Results   Component Value Date    NITRU Negative 08/28/2020    WBCUA 2 08/28/2020    RBCUA 7 08/28/2020    BLOODU SMALL 08/28/2020    SPECGRAV 1.020 08/28/2020    GLUCOSEU 250 08/28/2020       Radiology:  XR CHEST PORTABLE   Final Result   No acute process. CTA HEAD NECK W CONTRAST   Final Result   1. No acute arterial abnormality or hemodynamically significant arterial   stenosis in the head or neck. 2. Multinodular thyroid gland with a 1.6 cm dominant left thyroid nodule. Nonemergent follow-up outpatient thyroid ultrasound is recommended for   further evaluation. CT Head WO Contrast   Preliminary Result   Foci of abnormal low-attenuation within periventricular/subcortical white   matter. Finding could be on the basis of changes of mild ischemic   leukoencephalopathy. However, in a patient in this age group without   significant atherosclerotic calcification of intracranial vasculature, other   etiologies including changes of demyelinating process also in the   differential diagnosis. Clinical correlation would be helpful. Critical results were called by Светлана Mcgill. Fernanda Dozier MD to Dr. Randee Hardin on   8/27/2020 at 16:02.          MRI brain without contrast    (Results Pending)               Electronically signed by Farnaz March MD on 8/28/2020 at 8:33 AM

## 2020-08-28 NOTE — PROGRESS NOTES
CLINICAL BEDSIDE SWALLOWING EVALUATION  Speech Therapy Department    Patient Name:  Helen Soriano  :  1975  Pain level:denies   Medical Diagnosis:   Right sided numbness [R20.0]  Right sided numbness [R20.0]    HPI: \"This is a pleasant 39 y.o. female who presented to the emergency room with complaints of complete right-sided numbness and weakness that started around 4 AM this morning. She has a past medical history of insulin-dependent diabetes mellitus, essential hypertension, diabetic nephropathy, tobacco dependence and states she took all of her medications last night and this morning her blood sugar was in the 50s so she drank a regular soda. She has been following with ophthalmology for various eye procedures, states she has a history of migraine headache but is not have any migraines currently. She has diabetic neuropathy in her bilateral lower extremities and she cannot tell if her right leg is numb from neuropathy or something new but definitely feels like her right upper extremity is numb. She denies chest pain, palpitations, fever, chills, nausea, vomiting, diarrhea, abdominal pain, dysuria, productive cough. \"  MRI revealed:  Impression    Acute infarction in the posterior limb of the left internal capsule.         Minimal chronic microvascular disease.         Severe left mastoid effusion. Treatment Diagnosis:  Dysphagia    Impressions:  SLP eval and treat orders received per CVA protocol. Per chart, pt NIH score is 4. Pt passed swallow screen with RN. She reports right sided weakness. Pt reported that her  initially felt as if the right side of her mouth was drooping however, she feels this is resolved. Speech was intelligible however, slightly decreased articulatory precision was questioned. No aphasia, receptive deficits or cognitive linguistic deficits were informally assessed. CXR revealed no acute process. Pt reports baseline visual deficits.      Pt was postioned upright in chair on RA. She was oriented to all aspects and denied any difficulty with swallowing. Various consistency trials were provided to assess swallow function. Oral mechanism examination appeared to be grossly [facility]. Pt demonstrated functional mastication and bolus propulsion with effective oral clearing. Pharyngeal phase of swallow appears grossly WNL. No pharyngeal deficits noted during evaluation. Laryngeal elevation appears WFL based upon palpation of anterior neck during swallow. Vocal quality dry before and after po trials. Although pt appeared to tolerate gross tolerance of regular textures and thin liquids and no overt deficits were informally assessed due to new CVA recommend follow-up x1 to ensure diet tolerance and for full speech language evaluation if indicated. Dietary Recommendations:  Regular texture diet with thin liquids     Strategies: 90 degree positioning with all p.o. intake; small bites/sips; alternate textures through meal; reduce rate of intake    Treatment/Goals: Speech therapy for dysphagia tx 1-2x to ensure diet tolerance     ST.) Pt will tolerate recommended diet without s/s of aspiration       Oral motor Exam:  Dentition: missing some dentition   Labial/Facial: grossly WFL  Lingual: grossly WFL  Voice: grossly WFL     Oral Phase:   Oral phase of swallow grossly appears WNL. No oral phase deficits noted during evaluation. Pharyngeal Phase:  Pharyngeal phase of swallow appears grossly WNL. No pharyngeal deficits noted during evaluation. Laryngeal elevation appears WFL based upon palpation of anterior neck during swallow. Vocal quality dry before and after po trials. Patient/Family Education:Education, results and recommendations given to the Pt and nurse, who verbalized understanding    Timed Code Treatment: 0 minutes     Total Treatment Time: 30 minutes     Discharge Recommendations: Speech Therapy for Speech/Dysphagia treatment at discharge.     If patient discharges prior to next session this note will serve as a discharge summary.       Kari Garcia, [facility]  Speech Language Pathologist

## 2020-08-29 LAB
ALBUMIN SERPL-MCNC: 2.8 G/DL (ref 3.4–5)
ANION GAP SERPL CALCULATED.3IONS-SCNC: 12 MMOL/L (ref 3–16)
BUN BLDV-MCNC: 21 MG/DL (ref 7–20)
CALCIUM SERPL-MCNC: 8.3 MG/DL (ref 8.3–10.6)
CHLORIDE BLD-SCNC: 102 MMOL/L (ref 99–110)
CO2: 22 MMOL/L (ref 21–32)
CREAT SERPL-MCNC: 1.4 MG/DL (ref 0.6–1.1)
GFR AFRICAN AMERICAN: 49
GFR NON-AFRICAN AMERICAN: 41
GLUCOSE BLD-MCNC: 104 MG/DL (ref 70–99)
GLUCOSE BLD-MCNC: 184 MG/DL (ref 70–99)
GLUCOSE BLD-MCNC: 192 MG/DL (ref 70–99)
GLUCOSE BLD-MCNC: 247 MG/DL (ref 70–99)
GLUCOSE BLD-MCNC: 292 MG/DL (ref 70–99)
HCT VFR BLD CALC: 37.3 % (ref 36–48)
HEMOGLOBIN: 12.6 G/DL (ref 12–16)
MCH RBC QN AUTO: 30 PG (ref 26–34)
MCHC RBC AUTO-ENTMCNC: 33.8 G/DL (ref 31–36)
MCV RBC AUTO: 88.8 FL (ref 80–100)
PDW BLD-RTO: 15.7 % (ref 12.4–15.4)
PERFORMED ON: ABNORMAL
PHOSPHORUS: 4.1 MG/DL (ref 2.5–4.9)
PLATELET # BLD: 441 K/UL (ref 135–450)
PMV BLD AUTO: 8 FL (ref 5–10.5)
POTASSIUM SERPL-SCNC: 3.6 MMOL/L (ref 3.5–5.1)
RBC # BLD: 4.2 M/UL (ref 4–5.2)
SODIUM BLD-SCNC: 136 MMOL/L (ref 136–145)
WBC # BLD: 15.2 K/UL (ref 4–11)

## 2020-08-29 PROCEDURE — 85027 COMPLETE CBC AUTOMATED: CPT

## 2020-08-29 PROCEDURE — 97110 THERAPEUTIC EXERCISES: CPT

## 2020-08-29 PROCEDURE — 94760 N-INVAS EAR/PLS OXIMETRY 1: CPT

## 2020-08-29 PROCEDURE — 36415 COLL VENOUS BLD VENIPUNCTURE: CPT

## 2020-08-29 PROCEDURE — 6370000000 HC RX 637 (ALT 250 FOR IP): Performed by: HOSPITALIST

## 2020-08-29 PROCEDURE — 99233 SBSQ HOSP IP/OBS HIGH 50: CPT | Performed by: PSYCHIATRY & NEUROLOGY

## 2020-08-29 PROCEDURE — 97116 GAIT TRAINING THERAPY: CPT

## 2020-08-29 PROCEDURE — 80069 RENAL FUNCTION PANEL: CPT

## 2020-08-29 PROCEDURE — 6370000000 HC RX 637 (ALT 250 FOR IP): Performed by: FAMILY MEDICINE

## 2020-08-29 PROCEDURE — 2060000000 HC ICU INTERMEDIATE R&B

## 2020-08-29 PROCEDURE — 2580000003 HC RX 258: Performed by: FAMILY MEDICINE

## 2020-08-29 RX ADMIN — Medication 10 ML: at 21:54

## 2020-08-29 RX ADMIN — ASPIRIN 81 MG: 81 TABLET, COATED ORAL at 10:01

## 2020-08-29 RX ADMIN — LISINOPRIL 40 MG: 20 TABLET ORAL at 10:01

## 2020-08-29 RX ADMIN — DESMOPRESSIN ACETATE 40 MG: 0.2 TABLET ORAL at 21:54

## 2020-08-29 RX ADMIN — SPIRONOLACTONE 50 MG: 25 TABLET ORAL at 10:00

## 2020-08-29 RX ADMIN — INSULIN LISPRO 4 UNITS: 100 INJECTION, SOLUTION INTRAVENOUS; SUBCUTANEOUS at 17:19

## 2020-08-29 RX ADMIN — INSULIN LISPRO 3 UNITS: 100 INJECTION, SOLUTION INTRAVENOUS; SUBCUTANEOUS at 21:51

## 2020-08-29 RX ADMIN — Medication 10 ML: at 10:03

## 2020-08-29 RX ADMIN — ACETAMINOPHEN 650 MG: 325 TABLET ORAL at 21:54

## 2020-08-29 RX ADMIN — INSULIN LISPRO 2 UNITS: 100 INJECTION, SOLUTION INTRAVENOUS; SUBCUTANEOUS at 12:49

## 2020-08-29 RX ADMIN — FUROSEMIDE 40 MG: 40 TABLET ORAL at 10:01

## 2020-08-29 RX ADMIN — FUROSEMIDE 40 MG: 40 TABLET ORAL at 17:29

## 2020-08-29 RX ADMIN — PROPRANOLOL HYDROCHLORIDE 60 MG: 60 CAPSULE, EXTENDED RELEASE ORAL at 10:01

## 2020-08-29 ASSESSMENT — PAIN DESCRIPTION - PAIN TYPE
TYPE: NEUROPATHIC PAIN

## 2020-08-29 ASSESSMENT — PAIN DESCRIPTION - ORIENTATION
ORIENTATION: RIGHT;LEFT

## 2020-08-29 ASSESSMENT — PAIN DESCRIPTION - LOCATION
LOCATION: FOOT
LOCATION: FOOT;LEG
LOCATION: FOOT

## 2020-08-29 ASSESSMENT — PAIN SCALES - GENERAL
PAINLEVEL_OUTOF10: 4
PAINLEVEL_OUTOF10: 5
PAINLEVEL_OUTOF10: 5
PAINLEVEL_OUTOF10: 3
PAINLEVEL_OUTOF10: 0
PAINLEVEL_OUTOF10: 4
PAINLEVEL_OUTOF10: 3
PAINLEVEL_OUTOF10: 0
PAINLEVEL_OUTOF10: 3
PAINLEVEL_OUTOF10: 2

## 2020-08-29 ASSESSMENT — PAIN DESCRIPTION - PROGRESSION
CLINICAL_PROGRESSION: GRADUALLY IMPROVING
CLINICAL_PROGRESSION: GRADUALLY WORSENING
CLINICAL_PROGRESSION: GRADUALLY IMPROVING

## 2020-08-29 ASSESSMENT — PAIN SCALES - WONG BAKER: WONGBAKER_NUMERICALRESPONSE: 0

## 2020-08-29 NOTE — PLAN OF CARE
Re-Assessment complete, see flow sheet. NIHSS 4. Will continue to monitor. Denny Mccann RN, BSN, PCCN.

## 2020-08-29 NOTE — PLAN OF CARE
Problem: Cardiovascular  Goal: Hemodynamic stability  Outcome: Ongoing     Problem: Respiratory  Goal: O2 Sat > 90%  Outcome: Ongoing     Problem: Anxiety:  Goal: Level of anxiety will decrease  Description: Level of anxiety will decrease  Outcome: Ongoing     Vitals:    08/29/20 0404   BP: (!) 153/88   Pulse: 89   Resp: 14   Temp: 96.8 °F (36 °C)   SpO2: 96%     Pt awake with RN at bedside. VSS with /88 and oxygenation saturations wnl on RA. See all flowsheets. Will continue to monitor.

## 2020-08-29 NOTE — PLAN OF CARE
At 10:13 PM messaged hospitalist in [employer],\"Thank you. Pt having continued neuropathic pain in feet and not able to have Tylenol again until approx 2351; pt would like to know if she can have something else that will help please. \"     Hospitalist replied in [employer] at 10:36 PM, \"Will try a dose of gabapentin. If it helps, let her know to discuss continuing it with the doc in the morning. \"     RN responded at 10:49 PM, \"pt refused dose & said that she has had it before but made it worse\" and at 10:50 PM added, \"meds d/w pt & she said she has never had lyrica & would be willing to try that or something else. \"    Message was marked as Read at 10:53 PM

## 2020-08-29 NOTE — PLAN OF CARE
Re-Assessment complete, see flow sheet. NIHSS 5 with new pronator drift right leg & new ataxia right arm, facial droop gone. Blood sugar 184. Reviewed correctional insulin prior to giving, the patient verbalized understanding. States neuropathy pain in bilateral feet down to #2. Will continue to monitor. Denny Mccann RN, BSN, PCCN.

## 2020-08-29 NOTE — PROGRESS NOTES
The patient is resting with eyes closed, doesn't arouse to soft voice. Will continue to monitor. Molly Mckeon RN, BSN.

## 2020-08-29 NOTE — PROGRESS NOTES
Physical Therapy  Facility/Department: [address]  Daily Treatment Note  NAME: Lele Gibson  : 1975  MRN: [id number]    Date of Service: 2020    Discharge Glenna Osborne scored a 20/24 on the AM-PAC short mobility form. Current research shows that an AM-PAC score of 17 or less is typically not associated with a discharge to the patient's home setting. Based on the patient's AM-PAC score and their current functional mobility deficits, it is recommended that the patient have 5-7 sessions per week of Physical Therapy at d/c to increase the patient's independence. At this time, this patient demonstrates the endurance, and/or tolerance for 3 hours of therapy each day, with a treatment frequency of 5-7x/wk. Please see assessment section for further patient specific details. If patient discharges prior to next session this note will serve as a discharge summary. Please see below for the latest assessment towards goals. 5-7 sessions per week   PT Equipment Recommendations  Equipment Needed: No(has RW at home)    Assessment   Body structures, Functions, Activity limitations: Decreased functional mobility ; Decreased ADL status; Decreased strength;Decreased vision/visual deficit; Decreased balance;Decreased sensation;Decreased fine motor control;Decreased coordination  Assessment: Pt presents with improved numbness on R LE and UE as well as improved coordination with functional activities. . Pt is highly motivated but has a significant medical history, particularly her vision issues which are somewhat limiting. Recommending further inpatient therapy at this time to facilitate ind with all balance and mobiilty prior to d/c to home.   Treatment Diagnosis: impaired coordination, balance and motor control  Prognosis: Good  Decision Making: Medium Complexity  PT Education: Functional Mobility Training;PT Role;Goals;Plan of Care;Gait Training;Transfer Training  Patient Education: dc recommendations: pt verbalizes understanding  Barriers to Learning: vision  REQUIRES PT FOLLOW UP: Yes  Activity Tolerance  Activity Tolerance: Patient Tolerated treatment well;Patient limited by endurance  Activity Tolerance: Improved ambulation distance with session. Patient Diagnosis(es): The primary encounter diagnosis was Cerebrovascular accident (CVA), unspecified mechanism ([facility] [address] A diagnosis of Hypertension, unspecified type was also pertinent to this visit. has a past medical history of Diabetes mellitus out of control ([facility] [address] Diabetes mellitus, type II ([facility] [address] Generalized headaches, Infertility, Insomnia, Migraine headache, Mixed hyperlipidemia, Otitis media, Pelvic abscess in female, and Pneumonia. has a past surgical history that includes Cervix surgery. Restrictions  Restrictions/Precautions  Restrictions/Precautions: Fall Risk, Up as Tolerated(high fall risk)  Required Braces or Orthoses?: No  Position Activity Restriction  Other position/activity restrictions: Pt admitted with s/s of CVA. Pt with recent history of bilateral retinal detachment during surgery to drain blood from hemorrhages in eyes. She has a past medical history of insulin-dependent diabetes mellitus, essential hypertension, diabetic nephropathy, tobacco de dependency, migraine headache but is not have any migraines currently. She has diabetic neuropathy in her bilateral lower extremities  Subjective   General  Chart Reviewed: Yes  Response To Previous Treatment: Patient with no complaints from previous session. Family / Caregiver Present: No  Subjective  Subjective: Pt agreeable to PT. Pt states LE pain is much better since new med was a 6/10 now down to 3/10. General Comment  Comments: Pt supine in bed with head of bed elevated.   Pain Screening  Patient Currently in Pain: Yes  Pain Assessment  Pain Assessment: 0-10  Pain Level: 3  Pain Type: Neuropathic pain  Pain Location: Foot;Leg  Pain Orientation: Right;Left  Vital Signs  Patient Currently in Pain: Yes       Orientation  Orientation  Overall Orientation Status: Within Normal Limits  Cognition      Objective   Bed mobility  Rolling to Right: Independent  Supine to Sit: Modified independent  Scooting: Modified independent  Transfers  Sit to Stand: Stand by assistance  Stand to sit: Stand by assistance  Ambulation  Ambulation?: Yes  More Ambulation?: No  Ambulation 1  Surface: level tile  Device: Rolling Walker  Assistance: Contact guard assistance;Stand by assistance  Quality of Gait: Initally pt demonstrating proper foot clearance and step length bilaterally, slight decreased judson toe pattern. Gait Deviations: Slow Kinjal;Decreased step length;Decreased step height  Distance: 175'  Comments: SBA-CGA, as pt R LE began to fatigue with increased ambulation decreased step length, and step height. Slight dragging at times with step through on R LE, but able to self correct and pt aware. Pt had no episodes of buckling during ambualtion this date. Stairs/Curb  Stairs?: No     Balance  Posture: Good  Sitting - Static: Good  Sitting - Dynamic: Good  Standing - Static: Good(with RW)  Standing - Dynamic: Good;-(with RW)  Comments: Pt was Indendpent with using bathroom and pericare.   Exercises  Gluteal Sets: x10  Hip Flexion: seated marching x10 tc  Knee Long Arc Quad: x10 tc                        G-Code     OutComes Score                                                     AM-PAC Score  AM-PAC Inpatient Mobility Raw Score : 20 (08/29/20 0900)  AM-PAC Inpatient T-Scale Score : 47.67 (08/29/20 0900)  Mobility Inpatient CMS 0-100% Score: 35.83 (08/29/20 0900)  Mobility Inpatient CMS G-Code Modifier : CJ (08/29/20 0900)          Goals  Short term goals  Time Frame for Short term goals: at discharge:  Short term goal 1: Pt to be modified ind with sit<>stand from various surfaces  Short term goal 2: Pt to be modified ind ambulating 200' with LRAD  Short term goal 3: Pt to perform 1 step with UE support with supv to allow for safe community navigation  Patient Goals   Patient goals : to be able to go home and do what she did before    Plan    Plan  Times per week: 5-7x/week  Times per day: Daily  Current Treatment Recommendations: Strengthening, Transfer Training, Neuromuscular Re-education, Patient/Caregiver Education & Training, Balance Training, Gait Training, Home Exercise Program, Safety Education & Training, Stair training, Functional Mobility Training  Safety Devices  Type of devices: Call light within reach, Chair alarm in place, Gait belt, Nurse notified, Left in chair, All fall risk precautions in place  Restraints  Initially in place: No     Therapy Time   Individual Concurrent Group Co-treatment   Time In 0820         Time Out [facility]         Minutes [address] [address]

## 2020-08-29 NOTE — PROGRESS NOTES
Hospitalist Progress Note    Patient:  Liz Clayton  Unit/Bed:[id number]-3371/3371-02   YOB: 1975       MRN: [id number] Acct: [de-identified]  PCP: No primary care provider on file. Date of Admission: 8/27/2020  --------------------------    Chief Complaint:     Right-sided weakness    Hospital Course:     Liz Clayton is a 39 y.o. female hospitalized on 8/27/2020 with right-sided weakness, found to have acute CVA on MRI    Assessment:     Acute CVA. MRI showed acute infarction in the posterior limb of the left internal capsule  -No flow-limiting stenosis on CTA of the head and neck  - neurology input appreciate it  - patient agree to take the ASA. She will call retina specialist to see if it is okay if she uses Plavix. -Follow-up echocardiogram  -Overall symptoms improved, inpatient rehab input greatly appreciated, patient still functional anticipated IRU        Uncontrolled anxiety  Psychiatry consult     Uncontrolled essential hypertension, likely secondary to the stroke  -Blood pressure reading acceptable, continue monitoring           Uncontrolled insulin-dependent diabetes mellitus type 2 with nephropathy  -Continue current insulin therapy,  - A1`c  9.1  - Monitor blood sugar        Chronic kidney disease stage III  -Monitor creatinine while inpatient        Multinodular thyroid gland/MRI alluded to incidental finding  -Follow-up with PCP for outpatient thyroid ultrasound        Tobacco dependence  -NicoDerm, advised to quit        Chronic diastolic congestive heart failure  -Stable             Code Status: Full Code         DVT prophylaxis:  lovenox     Disposition: ? Inpatient rehab    I discussed my thought processes at length with patient/family and patient understood.  Question and concerns  Addressed      Discussed with RN     ----------------      Subjective:     Patient seen and examined  Overnight events noted  RN and ancillary staff note reviewed    MRI results reviewed with the patient  Patient would like to consult with a retinal specialist as she previously had diabetic retinopathy and instructed not to take blood thinners  Her weakness improved  Lyrica helped her the most  Intermittently patient was sobbing in the room complaining that she has uncontrolled anxiety and nobody is taking her seriously. Diet: DIET CARB CONTROL; Carb Control: 4 carb choices (60 gms)/meal    OBJECTIVE     Exam:  BP (!) 142/90   Pulse 88   Temp 96.1 °F (35.6 °C) (Temporal)   Resp 18   Ht 5' 7\" (1.702 m)   Wt 191 lb (86.6 kg)   SpO2 98%   BMI 29.91 kg/m²          Gen: Not in distress. Alert. Emotional  Head: Normocephalic. Atraumatic. Eyes: Conjunctivae/corneas clear. ENT: Oral mucosa moist  Neck: No JVD. No obvious thyromegaly. CVS: Nml S1S2, no murmur  , RRR  Pulmomary: Clear bilaterally. No crackles. No wheezes. Gastrointestinal: Soft, non tender, non distend, . Musculoskeletal: No edema. Warm  Neuro: Improved right side weakness no focal deficit. Moves extremity spontaneously.   Psychiatry: Emotional.      Medications:  Reviewed    Infusion Medications    dextrose       Scheduled Medications    insulin glargine  30 Units Subcutaneous Nightly    furosemide  40 mg Oral BID    lisinopril  40 mg Oral Daily    propranolol  60 mg Oral Daily    spironolactone  50 mg Oral Daily    sodium chloride flush  10 mL Intravenous 2 times per day    enoxaparin  40 mg Subcutaneous Daily    aspirin  81 mg Oral Daily    Or    aspirin  300 mg Rectal Daily    atorvastatin  40 mg Oral Nightly    insulin lispro  0-12 Units Subcutaneous TID WC    insulin lispro  0-6 Units Subcutaneous Nightly    nicotine  1 patch Transdermal Daily     PRN Meds: acetaminophen, labetalol, sodium chloride flush, polyethylene glycol, promethazine **OR** ondansetron, glucose, dextrose, glucagon (rDNA), dextrose      Intake/Output Summary (Last 24 hours) at 8/29/2020 1236  Last data filed at 8/29/2020 0930  Gross per 24 hour Intake 240 ml   Output 950 ml   Net -710 ml             Labs:   Recent Labs     08/27/20  1544 08/28/20  0502 08/29/20  1036   WBC 17.3* 16.3* 15.2*   HGB 13.3 12.3 12.6   HCT 38.2 36.1 37.3   * 421 441     Recent Labs     08/27/20  1544 08/28/20  0502 08/29/20  1036    139 136   K 3.4* 3.6 3.6    105 102   CO2 23 24 22   BUN 21* 15 21*   CREATININE 1.4* 1.3* 1.4*   CALCIUM 9.0 8.6 8.3   PHOS  --   --  4.1     Recent Labs     08/27/20  1544   AST 15   ALT 9*   BILIDIR <0.2   BILITOT <0.2   ALKPHOS 144*     Recent Labs     08/27/20  1544   INR 0.91     Recent Labs     08/27/20  1544 08/27/20  2354 08/28/20  0502   TROPONINI 0.05* 0.04* 0.05*       Urinalysis:      Lab Results   Component Value Date    NITRU Negative 08/28/2020    WBCUA 2 08/28/2020    RBCUA 7 08/28/2020    BLOODU SMALL 08/28/2020    SPECGRAV 1.020 08/28/2020    GLUCOSEU 250 08/28/2020       Radiology:  MRI brain without contrast   Final Result   Acute infarction in the posterior limb of the left internal capsule. Minimal chronic microvascular disease. Severe left mastoid effusion. XR CHEST PORTABLE   Final Result   No acute process. CTA HEAD NECK W CONTRAST   Final Result   1. No acute arterial abnormality or hemodynamically significant arterial   stenosis in the head or neck. 2. Multinodular thyroid gland with a 1.6 cm dominant left thyroid nodule. Nonemergent follow-up outpatient thyroid ultrasound is recommended for   further evaluation. CT Head WO Contrast   Final Result   Foci of abnormal low-attenuation within periventricular/subcortical white   matter. Finding could be on the basis of changes of mild ischemic   leukoencephalopathy. However, in a patient in this age group without   significant atherosclerotic calcification of intracranial vasculature, other   etiologies including changes of demyelinating process also in the   differential diagnosis.   Follow-up MRI examination may be helpful for more   complete evaluation. Critical results were called by Elroy Reyes. Lillian Davis MD to Dr. Shon Velázquez on   8/27/2020 at 16:02.                      Electronically signed by Ottoniel Kaplan MD on 8/29/2020 at 12:36 PM

## 2020-08-29 NOTE — PLAN OF CARE
Problem: Cardiovascular  Goal: Hemodynamic stability  Outcome: Ongoing     Problem: Respiratory  Goal: O2 Sat > 90%  Outcome: Ongoing     Problem: Pain:  Goal: Pain level will decrease  Description: Pain level will decrease  Outcome: Ongoing     Vitals:    08/29/20 0008   BP: (!) 176/82   Pulse: 88   Resp: 14   Temp: 96.4 °F (35.8 °C)   SpO2: 95%     Pt in bed with eyes closed, open with RN at bedside. Pt reports pain down to 3/10 s/p lyrica. VSS with oxygenation saturations wnl on RA. See all flowsheets. Will continue to monitor.

## 2020-08-29 NOTE — PLAN OF CARE
Problem: Serum Glucose Level - Abnormal:  Goal: Ability to maintain appropriate glucose levels will improve  Description: Ability to maintain appropriate glucose levels will improve  Outcome: Ongoing      at 2105 and lantus dose increased to 30 units nightly per hospitalist per pt's home dose. Gave 3 units insulin per ssi and 30 units lantus at 2158. See [facility] & all flowsheets. Will continue to monitor.

## 2020-08-29 NOTE — PLAN OF CARE
Assessment complete, see flow sheet. NIHSS 4. BP (!) 142/90   Pulse 88   Temp 96.1 °F (35.6 °C) (Temporal)   Resp 18   Ht 5' 7\" (1.702 m)   Wt 191 lb (86.6 kg)   SpO2 98%   BMI 29.91 kg/m²  room air, normal sinus rhythm. Blood sugar 104, order parameters for correctional insulin not met. Ambulated to bathroom, in wells & up to chair with PT/OT, gait belt & front wheel walker, the patient tolerated fairly well. Chair alarm in place, door open, encouraged to use call light for needs, phone and call light are within reach. Will continue to monitor.   Ernesto Mleendez RN, BSN

## 2020-08-29 NOTE — PLAN OF CARE
Problem: Cardiovascular  Goal: Hemodynamic stability  Outcome: Ongoing     Problem: Respiratory  Goal: O2 Sat > 90%  Outcome: Ongoing     Vitals:    08/28/20 2022   BP: (!) 179/82   Pulse: 89   Resp: 16   Temp: 96.4 °F (35.8 °C)   SpO2: 96%     Pt was asleep in bed, awake with RN at bedside. VSS at 2022 with /82 and oxygenation saturations wnl 96% on RA. Gave PRN labetalol at 2035 for SBP > 170 per MD orders. See [facility] & all flowsheets. Will continue to monitor.

## 2020-08-29 NOTE — PROGRESS NOTES
Oral BID Darron Boss MD   40 mg at 08/29/20 1001    lisinopril (PRINIVIL;ZESTRIL) tablet 40 mg  40 mg Oral Daily Darron Boss MD   40 mg at 08/29/20 1001    propranolol (INDERAL LA) extended release capsule 60 mg  60 mg Oral Daily Darron Boss MD   60 mg at 08/29/20 1001    spironolactone (ALDACTONE) tablet 50 mg  50 mg Oral Daily Darron Boss MD   50 mg at 08/29/20 1000    sodium chloride flush 0.9 % injection 10 mL  10 mL Intravenous 2 times per day Darron Boss MD   10 mL at 08/29/20 1003    sodium chloride flush 0.9 % injection 10 mL  10 mL Intravenous PRN Darron Boss MD        polyethylene glycol (GLYCOLAX) packet 17 g  17 g Oral Daily PRN Darron Boss MD        promethazine (PHENERGAN) tablet 12.5 mg  12.5 mg Oral Q6H PRN Darron Boss MD        Or    ondansetron (ZOFRAN) injection 4 mg  4 mg Intravenous Q6H PRN Darron Boss MD        enoxaparin (LOVENOX) injection 40 mg  40 mg Subcutaneous Daily Darron Boss MD        glucose (GLUTOSE) 40 % oral gel 15 g  15 g Oral PRN Darron Boss MD        dextrose 50 % IV solution  12.5 g Intravenous PRN Darron Boss MD        glucagon (rDNA) injection 1 mg  1 mg Intramuscular PRN Darron Boss MD        dextrose 5 % solution  100 mL/hr Intravenous PRN Darron Boss MD        aspirin EC tablet 81 mg  81 mg Oral Daily Darron Boss MD   81 mg at 08/29/20 1001    Or    aspirin suppository 300 mg  300 mg Rectal Daily Darron Boss MD        atorvastatin (LIPITOR) tablet 40 mg  40 mg Oral Nightly Darron Boss MD   40 mg at 08/28/20 2026    insulin lispro (1 Unit Dial) 0-12 Units  0-12 Units Subcutaneous TID WC Darron Boss MD   6 Units at 08/28/20 1752    insulin lispro (1 Unit Dial) 0-6 Units  0-6 Units Subcutaneous Nightly Darron Boss MD   3 Units at 08/28/20 2158    nicotine (NICODERM CQ) 21 MG/24HR 1 patch legs.  Gait/Posture: steady gait        Data:  LABS:   Lab Results   Component Value Date     08/29/2020    K 3.6 08/29/2020    K 3.6 08/28/2020     08/29/2020    CO2 22 08/29/2020    BUN 21 08/29/2020    CREATININE 1.4 08/29/2020    GFRAA 49 08/29/2020    GFRAA >60 11/09/2011    LABGLOM 41 08/29/2020    GLUCOSE 192 08/29/2020    PHOS 4.1 08/29/2020    MG 1.70 08/27/2020    CALCIUM 8.3 08/29/2020     Lab Results   Component Value Date    WBC 15.2 08/29/2020    RBC 4.20 08/29/2020    HGB 12.6 08/29/2020    HCT 37.3 08/29/2020    MCV 88.8 08/29/2020    RDW 15.7 08/29/2020     08/29/2020     Lab Results   Component Value Date    INR 0.91 08/27/2020    PROTIME 10.6 08/27/2020       Neuroimaging was independently reviewed by me and discussed results with the patient  I reviewed blood testing and other test results and discussed results with the patient      Impression:  Acute right-sided weakness secondary to new left hemispheric lacunar stroke. Hypertension, not controlled  Diabetes, not controlled with diabetic retinopathy and polyneuropathy  Hyperlipidemia, not controlled  Smoker      Recommendation  Long discussion with the patient today regarding pros and cons of taking baby aspirin daily with her history of diabetic retinopathy. She agreed to take baby aspirin today and follow-up with her ophthalmologist on Monday for clearance. Continue Lipitor  Insulin sliding scale  Blood sugar monitor and control closely at home  Nicotine patch  Telemetry  PT and OT  Should consider repeating echo  Blood pressure monitor and continue current blood pressure medications  Long discussion today with the patient regarding secondary stroke prevention, risk of recurrence and risk of poorly controlled diabetes, hypertension hyperlipidemia  Can be discharged when medically stable with outpatient PT and OT  MDM: High complexity due to high risk of stroke recurrence and poorly controlled diabetes and hypertension. Toro Lake MD   [phone]      This dictation was generated by voice recognition computer software. Although all attempts are made to edit the dictation for accuracy, there may be errors in the transcription that are not intended.

## 2020-08-29 NOTE — CONSULTS
Patient: Lonny Dennis  [id number]  Date: 8/29/2020      Chief Complaint: Right-sided weakness    History of Present Illness/Hospital Course:  70-year-old female with a history of diabetes, hyperlipidemia, migraines, CKD, and neuropathy who was admitted on 8/27 with acute onset right-sided weakness. CT head with questionable focus of low attenuation. CTA without significant occlusion. MRI with an acute infarct in the posterior limb of the left internal capsule. Echo currently pending. A1c 9.1. . She was evaluated by therapy and suggested to continue in an inpatient setting prior to returning home. She was able to transition standby assist and ambulate contact-guard 175 feet. She feels as though she would be able to discharge to home with her  support. She lives in an accessible [address] [place] [address] as they care for her elderly mother. Prior Level of Function:  Independent    Current Level of Function:  CGA     has a past medical history of Diabetes mellitus out of control ([facility] [address] Diabetes mellitus, type II ([facility] [address] Generalized headaches, Infertility, Insomnia, Migraine headache, Mixed hyperlipidemia, Otitis media, Pelvic abscess in female, and Pneumonia. has a past surgical history that includes Cervix surgery. reports that she has been smoking cigarettes. She has been smoking about 1.00 pack per day. She has never used smokeless tobacco. She reports that she does not drink alcohol or use drugs. family history includes Cancer in her father; Diabetes in her father, mother, paternal grandmother, and sister; High Blood Pressure in her father. REVIEW OF SYSTEMS:   CONSTITUTIONAL: negative for fevers, chills, diaphoresis, appetite change, fatigue, night sweats and unexpected weight change. HEENT: negative for hearing loss, tinnitus, ear drainage, sinus pressure, nasal congestion, epistaxis and snoring. RESPIRATORY: Negative for hemoptysis, cough, sputum production.    CARDIOVASCULAR: negative appropriate. Skin: No visible abnormalities  MSK: No joint abnormalities noted. Ext: No significant edema appreciated. No varicosities. Lab Results   Component Value Date    WBC 16.3 (H) 08/28/2020    HGB 12.3 08/28/2020    HCT 36.1 08/28/2020    MCV 86.1 08/28/2020     08/28/2020     Lab Results   Component Value Date    INR 0.91 08/27/2020    INR 1.27 (H) 10/04/2013    PROTIME 10.6 08/27/2020    PROTIME 14.1 (H) 10/04/2013     Lab Results   Component Value Date    CREATININE 1.3 (H) 08/28/2020    BUN 15 08/28/2020     08/28/2020    K 3.6 08/28/2020     08/28/2020    CO2 24 08/28/2020     Lab Results   Component Value Date    ALT 9 (L) 08/27/2020    AST 15 08/27/2020    ALKPHOS 144 (H) 08/27/2020    BILITOT <0.2 08/27/2020       Most recent imaging studies revealed   EXAMINATION:    MRI OF THE BRAIN WITHOUT CONTRAST  8/28/2020 11:31 am         TECHNIQUE:    Multiplanar multisequence MRI of the brain was performed without the    administration of intravenous contrast.         COMPARISON:    CT head August 27, 2020         HISTORY:    ORDERING SYSTEM PROVIDED HISTORY: right sided numbness    TECHNOLOGIST PROVIDED HISTORY:    Reason for exam:->right sided numbness    Is the patient pregnant?->No    Reason for Exam: Pt states right sided arm and leg weakness x 1 day    Acuity: Acute    Type of Exam: Initial         FINDINGS:    INTRACRANIAL STRUCTURES/VENTRICLES: There is acute infarction in the    posterior limb of the left internal capsule.  The sulci, cisterns and    ventricles are age-appropriate in size.  There are a few scattered foci of    T2/FLAIR hyperintensity in the periventricular and subcortical white matter,    likely related to minimal chronic microvascular disease. No mass effect or    midline shift. No acute intracranial hemorrhage. There is no hydrocephalus.     The sellar/suprasellar regions appear unremarkable.  The normal signal voids    within the major intracranial vessels appear maintained.         ORBITS: The visualized portion of the orbits demonstrate no acute abnormality.         SINUSES: There is scattered minimal mucosal thickening in the paranasal    sinuses.  There is severe left mastoid effusion.         BONES/SOFT TISSUES: The bone marrow signal intensity appears normal. The soft    tissues demonstrate no acute abnormality.              Impression    Acute infarction in the posterior limb of the left internal capsule.         Minimal chronic microvascular disease.         Severe left mastoid effusion. EXAMINATION:    CTA OF THE HEAD AND NECK WITH CONTRAST 8/27/2020 3:37 pm:         TECHNIQUE:    CTA of the head and neck was performed with the administration of intravenous    contrast. Multiplanar reformatted images are provided for review.  MIP images    are provided for review. Stenosis of the internal carotid arteries measured    using NASCET criteria.  Dose modulation, iterative reconstruction, and/or    weight based adjustment of the mA/kV was utilized to reduce the radiation    dose to as low as reasonably achievable.         COMPARISON:    None.         HISTORY:    ORDERING SYSTEM PROVIDED HISTORY: RUE ataxia, paresthesia    TECHNOLOGIST PROVIDED HISTORY:    Reason for exam:->RUE ataxia, paresthesia    Reason for Exam: RUE ataxia, paresthesia    Acuity: Unknown    Type of Exam: Unknown         FINDINGS:         CTA NECK:         AORTIC ARCH/ARCH VESSELS: No dissection or arterial injury.  No significant    stenosis of the brachiocephalic or subclavian arteries.         CAROTID ARTERIES: No dissection, arterial injury, or hemodynamically    significant stenosis by NASCET criteria.         VERTEBRAL ARTERIES: No dissection, arterial injury, or significant stenosis.         SOFT TISSUES: The lung apices are clear.  No cervical or superior mediastinal    lymphadenopathy.  The larynx and pharynx are unremarkable.  No acute    abnormality of the salivary and thyroid glands.  Multinodular thyroid gland    with the largest nodule measuring 1.6 cm in diameter in the left lobe.         BONES: No acute osseous abnormality.  Left mastoid effusion.              CTA HEAD:         ANTERIOR CIRCULATION: No significant stenosis of the intracranial internal    carotid, anterior cerebral, or middle cerebral arteries. No aneurysm.         POSTERIOR CIRCULATION: No significant stenosis of the vertebral, basilar, or    posterior cerebral arteries. No aneurysm.         OTHER: No dural venous sinus thrombosis on this non-dedicated study.         BRAIN: No mass effect or midline shift. No extra-axial fluid collection. The    gray-white differentiation is maintained.              Impression    1. No acute arterial abnormality or hemodynamically significant arterial    stenosis in the head or neck. 2. Multinodular thyroid gland with a 1.6 cm dominant left thyroid nodule. Nonemergent follow-up outpatient thyroid ultrasound is recommended for    further evaluation. EXAMINATION:    CT OF THE HEAD WITHOUT CONTRAST  8/27/2020 3:37 pm         TECHNIQUE:    CT of the head was performed without the administration of intravenous    contrast. Dose modulation, iterative reconstruction, and/or weight based    adjustment of the mA/kV was utilized to reduce the radiation dose to as low    as reasonably achievable.         COMPARISON:    None         HISTORY:    ORDERING SYSTEM PROVIDED HISTORY: RUE ataxia, paresthesia    TECHNOLOGIST PROVIDED HISTORY:    Reason for exam:->RUE ataxia, paresthesia    Has a \"code stroke\" or \"stroke alert\" been called? ->Yes    Is the patient pregnant?->No    Reason for Exam: RUE ataxia, paresthesia    Acuity: Unknown    Type of Exam: Unknown         FINDINGS:    BRAIN/VENTRICLES: The ventricular system is within normal limits.  No    evidence of mass effect or midline shift.  There are foci of abnormal    low-attenuation within periventricular/subcortical white matter.  Tiny    nonspecific area of low attenuation approaching density of CSF identified in    the posterior left central sylvian region near the atrium of the left lateral    ventricle (images 26-30).  Finding may represent small focal area of remote    insult.  No other area of abnormal attenuation of brain parenchyma is    identified.  No abnormal extra-axial fluid collections are identified.         ORBITS: The visualized portion of the orbits demonstrate no acute abnormality.         SINUSES: The visualized paranasal sinuses demonstrate no acute abnormality. There is opacification of several left mastoid air cells.  Right mastoid air    cells are well aerated.  Small filling defect within the left external    auditory canal likely related to retained cerumen.         SOFT TISSUES/SKULL:  No acute abnormality of the visualized skull or soft    tissues.  There is a focal area of nonspecific induration of subcutaneous    soft tissues in the left parietal region (image 42).  Finding could be    related to prior trauma.              Impression    Foci of abnormal low-attenuation within periventricular/subcortical white    matter.  Finding could be on the basis of changes of mild ischemic    leukoencephalopathy. Hussein Sessions, in a patient in this age group without    significant atherosclerotic calcification of intracranial vasculature, other    etiologies including changes of demyelinating process also in the    differential diagnosis.  Follow-up MRI examination may be helpful for more    complete evaluation. The above laboratory data have been reviewed. The above imaging data have been reviewed. The above medical testing have been reviewed. Body mass index is 29.91 kg/m². Assessment and Plan:  Acute ischemic infarct: ASA, statin. PT/OT. Diabetes  CKD  Neuropathy  HLD    Dispo: Patient is too functional to anticipate and ARU approval through [address] [place] [address].   Given her current functional level and support at home, I agree with her request for home with outpatient therapies. Discharge dependent upon neurology and primary team approval.  We will sign off. Thank you for the consultation. Katelyn Kay MD 8/29/2020, 10:14 AM     * This document was created using dictation software. While all precautions were taken to ensure accuracy, errors may have occurred. Please disregard any typographical errors.

## 2020-08-30 LAB
GLUCOSE BLD-MCNC: 115 MG/DL (ref 70–99)
GLUCOSE BLD-MCNC: 232 MG/DL (ref 70–99)
GLUCOSE BLD-MCNC: 312 MG/DL (ref 70–99)
GLUCOSE BLD-MCNC: 325 MG/DL (ref 70–99)
PERFORMED ON: ABNORMAL

## 2020-08-30 PROCEDURE — 2060000000 HC ICU INTERMEDIATE R&B

## 2020-08-30 PROCEDURE — 2580000003 HC RX 258: Performed by: FAMILY MEDICINE

## 2020-08-30 PROCEDURE — 6370000000 HC RX 637 (ALT 250 FOR IP): Performed by: PHYSICIAN ASSISTANT

## 2020-08-30 PROCEDURE — 6370000000 HC RX 637 (ALT 250 FOR IP): Performed by: HOSPITALIST

## 2020-08-30 PROCEDURE — 6370000000 HC RX 637 (ALT 250 FOR IP): Performed by: PSYCHIATRY & NEUROLOGY

## 2020-08-30 PROCEDURE — 2500000003 HC RX 250 WO HCPCS: Performed by: FAMILY MEDICINE

## 2020-08-30 PROCEDURE — 99253 IP/OBS CNSLTJ NEW/EST LOW 45: CPT | Performed by: PSYCHIATRY & NEUROLOGY

## 2020-08-30 PROCEDURE — 6370000000 HC RX 637 (ALT 250 FOR IP): Performed by: FAMILY MEDICINE

## 2020-08-30 PROCEDURE — 99232 SBSQ HOSP IP/OBS MODERATE 35: CPT | Performed by: PSYCHIATRY & NEUROLOGY

## 2020-08-30 RX ORDER — LORAZEPAM 0.5 MG/1
0.5 TABLET ORAL 2 TIMES DAILY PRN
Qty: 30 TABLET | Refills: 0 | Status: SHIPPED | OUTPATIENT
Start: 2020-08-30 | End: 2020-09-29

## 2020-08-30 RX ORDER — PREGABALIN 75 MG/1
75 CAPSULE ORAL ONCE
Status: COMPLETED | OUTPATIENT
Start: 2020-08-30 | End: 2020-08-30

## 2020-08-30 RX ORDER — LORAZEPAM 0.5 MG/1
0.5 TABLET ORAL 2 TIMES DAILY PRN
Status: DISCONTINUED | OUTPATIENT
Start: 2020-08-30 | End: 2020-08-31 | Stop reason: HOSPADM

## 2020-08-30 RX ORDER — PREGABALIN 75 MG/1
75 CAPSULE ORAL NIGHTLY
Status: DISCONTINUED | OUTPATIENT
Start: 2020-08-30 | End: 2020-08-31 | Stop reason: HOSPADM

## 2020-08-30 RX ADMIN — Medication 10 ML: at 21:28

## 2020-08-30 RX ADMIN — INSULIN LISPRO 4 UNITS: 100 INJECTION, SOLUTION INTRAVENOUS; SUBCUTANEOUS at 17:35

## 2020-08-30 RX ADMIN — Medication 10 ML: at 09:17

## 2020-08-30 RX ADMIN — LISINOPRIL 40 MG: 20 TABLET ORAL at 09:18

## 2020-08-30 RX ADMIN — LORAZEPAM 0.5 MG: 0.5 TABLET ORAL at 11:22

## 2020-08-30 RX ADMIN — SERTRALINE HYDROCHLORIDE 50 MG: 50 TABLET, FILM COATED ORAL at 11:19

## 2020-08-30 RX ADMIN — FUROSEMIDE 40 MG: 40 TABLET ORAL at 09:18

## 2020-08-30 RX ADMIN — PREGABALIN 75 MG: 75 CAPSULE ORAL at 00:31

## 2020-08-30 RX ADMIN — Medication 10 ML: at 16:07

## 2020-08-30 RX ADMIN — SPIRONOLACTONE 50 MG: 25 TABLET ORAL at 09:18

## 2020-08-30 RX ADMIN — LABETALOL HYDROCHLORIDE 10 MG: 5 INJECTION, SOLUTION INTRAVENOUS at 11:23

## 2020-08-30 RX ADMIN — LABETALOL HYDROCHLORIDE 10 MG: 5 INJECTION, SOLUTION INTRAVENOUS at 21:32

## 2020-08-30 RX ADMIN — INSULIN LISPRO 4 UNITS: 100 INJECTION, SOLUTION INTRAVENOUS; SUBCUTANEOUS at 22:08

## 2020-08-30 RX ADMIN — FUROSEMIDE 40 MG: 40 TABLET ORAL at 17:35

## 2020-08-30 RX ADMIN — ACETAMINOPHEN 650 MG: 325 TABLET ORAL at 16:07

## 2020-08-30 RX ADMIN — PROPRANOLOL HYDROCHLORIDE 60 MG: 60 CAPSULE, EXTENDED RELEASE ORAL at 09:18

## 2020-08-30 RX ADMIN — ACETAMINOPHEN 650 MG: 325 TABLET ORAL at 11:23

## 2020-08-30 RX ADMIN — Medication 10 ML: at 11:23

## 2020-08-30 RX ADMIN — DESMOPRESSIN ACETATE 40 MG: 0.2 TABLET ORAL at 21:28

## 2020-08-30 RX ADMIN — LABETALOL HYDROCHLORIDE 10 MG: 5 INJECTION, SOLUTION INTRAVENOUS at 16:07

## 2020-08-30 RX ADMIN — INSULIN LISPRO 8 UNITS: 100 INJECTION, SOLUTION INTRAVENOUS; SUBCUTANEOUS at 11:28

## 2020-08-30 RX ADMIN — PREGABALIN 75 MG: 75 CAPSULE ORAL at 21:28

## 2020-08-30 RX ADMIN — ASPIRIN 81 MG: 81 TABLET, COATED ORAL at 09:19

## 2020-08-30 RX ADMIN — LORAZEPAM 0.5 MG: 0.5 TABLET ORAL at 22:07

## 2020-08-30 ASSESSMENT — PAIN DESCRIPTION - ORIENTATION
ORIENTATION: RIGHT;LEFT

## 2020-08-30 ASSESSMENT — PAIN SCALES - GENERAL
PAINLEVEL_OUTOF10: 3
PAINLEVEL_OUTOF10: 1
PAINLEVEL_OUTOF10: 3
PAINLEVEL_OUTOF10: 5
PAINLEVEL_OUTOF10: 2
PAINLEVEL_OUTOF10: 3
PAINLEVEL_OUTOF10: 2
PAINLEVEL_OUTOF10: 0
PAINLEVEL_OUTOF10: 7
PAINLEVEL_OUTOF10: 3

## 2020-08-30 ASSESSMENT — PAIN DESCRIPTION - PAIN TYPE
TYPE: NEUROPATHIC PAIN

## 2020-08-30 ASSESSMENT — PAIN DESCRIPTION - LOCATION
LOCATION: FOOT

## 2020-08-30 ASSESSMENT — PAIN DESCRIPTION - PROGRESSION: CLINICAL_PROGRESSION: GRADUALLY WORSENING

## 2020-08-30 NOTE — PLAN OF CARE
Problem: Cardiovascular  Goal: Hemodynamic stability  Outcome: Ongoing     Problem: Respiratory  Goal: O2 Sat > 90%  Outcome: Ongoing     Problem: Anxiety:  Goal: Level of anxiety will decrease  Description: Level of anxiety will decrease  Outcome: Ongoing     Vitals:    08/29/20 2007   BP: (!) 163/90   Pulse: 82   Resp: 16   Temp: 96.4 °F (35.8 °C)   SpO2: 99%     Pt awake in bed, watching program on tablet. Pt seems in good spirits and talkative with RN tonight about her anxiety over the years and her openness to therapy/psychiatry. VSS with /90 & oxygenation saturations wnl on RA. See all flowsheets. Will continue to monitor.

## 2020-08-30 NOTE — PLAN OF CARE
Problem: Cardiovascular  Goal: Hemodynamic stability  Outcome: Ongoing     Problem: Respiratory  Goal: O2 Sat > 90%  Outcome: Ongoing     Vitals:    08/30/20 0408   BP: (!) 163/85   Pulse: 81   Resp: 16   Temp: 96.1 °F (35.6 °C)   SpO2: 98%     Pt asleep overnight, awake with PCA at bedside. VSS with /85 and oxygenation saturations wnl on RA. See all flowsheets. Will continue to monitor.

## 2020-08-30 NOTE — PROGRESS NOTES
reviewed    Calm today, less anxious  Feels her right arm is a strong  She reported great benefit of using Lyrica with her neuropathy. Diet: DIET CARB CONTROL; Carb Control: 4 carb choices (60 gms)/meal    OBJECTIVE     Exam:  BP (!) 181/82   Pulse 91   Temp 96.1 °F (35.6 °C) (Temporal)   Resp 18   Ht 5' 7\" (1.702 m)   Wt 191 lb (86.6 kg)   SpO2 99%   BMI 29.91 kg/m²          Gen: Not in distress. Alert. Calm   Head: Normocephalic. Atraumatic. Eyes: Conjunctivae/corneas clear. ENT: Oral mucosa moist  Neck: No JVD. No obvious thyromegaly. CVS: Nml S1S2, no murmur , RRR  Pulmomary: Clear bilaterally. No crackles. No wheezes. Gastrointestinal: Soft, non tender, non distend, . Musculoskeletal: No edema. Warm  Neuro: Improved right sided weakness   psychiatry: Appropriate affect. Not agitated.         Medications:  Reviewed    Infusion Medications    dextrose       Scheduled Medications    sertraline  50 mg Oral Daily    insulin glargine  30 Units Subcutaneous Nightly    furosemide  40 mg Oral BID    lisinopril  40 mg Oral Daily    propranolol  60 mg Oral Daily    spironolactone  50 mg Oral Daily    sodium chloride flush  10 mL Intravenous 2 times per day    enoxaparin  40 mg Subcutaneous Daily    aspirin  81 mg Oral Daily    Or    aspirin  300 mg Rectal Daily    atorvastatin  40 mg Oral Nightly    insulin lispro  0-12 Units Subcutaneous TID WC    insulin lispro  0-6 Units Subcutaneous Nightly    nicotine  1 patch Transdermal Daily     PRN Meds: LORazepam, acetaminophen, labetalol, sodium chloride flush, polyethylene glycol, promethazine **OR** ondansetron, glucose, dextrose, glucagon (rDNA), dextrose      Intake/Output Summary (Last 24 hours) at 8/30/2020 1319  Last data filed at 8/30/2020 0830  Gross per 24 hour   Intake 600 ml   Output --   Net 600 ml             Labs:   Recent Labs     08/27/20  1544 08/28/20  0502 08/29/20  1036   WBC 17.3* 16.3* 15.2*   HGB 13.3 12.3 12.6   HCT 38.2 36.1 37.3   * 421 441     Recent Labs     08/27/20  1544 08/28/20  0502 08/29/20  1036    139 136   K 3.4* 3.6 3.6    105 102   CO2 23 24 22   BUN 21* 15 21*   CREATININE 1.4* 1.3* 1.4*   CALCIUM 9.0 8.6 8.3   PHOS  --   --  4.1     Recent Labs     08/27/20  1544   AST 15   ALT 9*   BILIDIR <0.2   BILITOT <0.2   ALKPHOS 144*     Recent Labs     08/27/20  1544   INR 0.91     Recent Labs     08/27/20  1544 08/27/20  2354 08/28/20  0502   TROPONINI 0.05* 0.04* 0.05*       Urinalysis:      Lab Results   Component Value Date    NITRU Negative 08/28/2020    WBCUA 2 08/28/2020    RBCUA 7 08/28/2020    BLOODU SMALL 08/28/2020    SPECGRAV 1.020 08/28/2020    GLUCOSEU 250 08/28/2020       Radiology:  MRI brain without contrast   Final Result   Acute infarction in the posterior limb of the left internal capsule. Minimal chronic microvascular disease. Severe left mastoid effusion. XR CHEST PORTABLE   Final Result   No acute process. CTA HEAD NECK W CONTRAST   Final Result   1. No acute arterial abnormality or hemodynamically significant arterial   stenosis in the head or neck. 2. Multinodular thyroid gland with a 1.6 cm dominant left thyroid nodule. Nonemergent follow-up outpatient thyroid ultrasound is recommended for   further evaluation. CT Head WO Contrast   Final Result   Foci of abnormal low-attenuation within periventricular/subcortical white   matter. Finding could be on the basis of changes of mild ischemic   leukoencephalopathy. However, in a patient in this age group without   significant atherosclerotic calcification of intracranial vasculature, other   etiologies including changes of demyelinating process also in the   differential diagnosis. Follow-up MRI examination may be helpful for more   complete evaluation. Critical results were called by Lena Bernstein. Lacey Rubin MD to Dr. Evelyn Bartlett on   8/27/2020 at 16:02.                      Electronically signed by Brandi Fleming MD on 8/30/2020 at 1:19 PM

## 2020-08-30 NOTE — PLAN OF CARE
Problem: Cardiovascular  Goal: Hemodynamic stability  Outcome: Ongoing     Problem: Pain:  Goal: Control of acute pain  Description: Control of acute pain  Outcome: Ongoing     Vitals:    08/30/20 0007   BP: (!) 163/88   Pulse: 83   Resp: 14   Temp: 96.4 °F (35.8 °C)   SpO2: 98%     VSS with /88, no IV PRN BP meds indicated at this time. See [facility] & all flowsheets. Gave lyrica now at 0031 as x1 order for continued neuropathic pain in feet. Will continue to monitor.

## 2020-08-30 NOTE — PROGRESS NOTES
lisinopril (PRINIVIL;ZESTRIL) tablet 40 mg  40 mg Oral Daily Rosie Agudelo MD   40 mg at 08/30/20 0918    propranolol (INDERAL LA) extended release capsule 60 mg  60 mg Oral Daily Rosie Agudelo MD   60 mg at 08/30/20 0918    spironolactone (ALDACTONE) tablet 50 mg  50 mg Oral Daily Rosie Agudelo MD   50 mg at 08/30/20 4056    sodium chloride flush 0.9 % injection 10 mL  10 mL Intravenous 2 times per day Rosie Agudelo MD   10 mL at 08/30/20 0917    sodium chloride flush 0.9 % injection 10 mL  10 mL Intravenous PRN Rosie Agudelo MD   10 mL at 08/30/20 1123    polyethylene glycol (GLYCOLAX) packet 17 g  17 g Oral Daily PRN Rosie Agudelo MD        promethazine (PHENERGAN) tablet 12.5 mg  12.5 mg Oral Q6H PRN Rosie Agudelo MD        Or    ondansetron (ZOFRAN) injection 4 mg  4 mg Intravenous Q6H PRN Rosie Agudelo MD        enoxaparin (LOVENOX) injection 40 mg  40 mg Subcutaneous Daily Rosie Agudelo MD        glucose (GLUTOSE) 40 % oral gel 15 g  15 g Oral PRN Rosie Agudelo MD        dextrose 50 % IV solution  12.5 g Intravenous PRN Rosie Agudelo MD        glucagon (rDNA) injection 1 mg  1 mg Intramuscular PRN Rosie Agudelo MD        dextrose 5 % solution  100 mL/hr Intravenous PRN Rosie Agudelo MD        aspirin EC tablet 81 mg  81 mg Oral Daily Rosie Agudelo MD   81 mg at 08/30/20 7328    Or    aspirin suppository 300 mg  300 mg Rectal Daily Rosie Agudelo MD        atorvastatin (LIPITOR) tablet 40 mg  40 mg Oral Nightly Rosie Agudelo MD   40 mg at 08/29/20 2154    insulin lispro (1 Unit Dial) 0-12 Units  0-12 Units Subcutaneous TID  Rosie Agudelo MD   8 Units at 08/30/20 1128    insulin lispro (1 Unit Dial) 0-6 Units  0-6 Units Subcutaneous Nightly Rosie Agudelo MD   3 Units at 08/29/20 2151    nicotine (NICODERM CQ) 21 MG/24HR 1 patch  1 patch Transdermal Daily Jodie Radha Carrero MD   1 patch at 08/30/20 4955     Allergies   Allergen Reactions    Amoxicillin Itching and Hives     Tolerates cephalosporins  Patient tolerating cefazolin (ANCEF) as of October 11, 2018    Levofloxacin Anaphylaxis    Levofloxacin Anaphylaxis    Vancomycin Shortness Of Breath, Hives and Anaphylaxis    Tape [Adhesive Tape] Other (See Comments)     Paper tape turns skin bright red. Plastic tape okay. reports that she has been smoking cigarettes. She has been smoking about 1.00 pack per day. She has never used smokeless tobacco. She reports that she does not drink alcohol or use drugs. Objective:  Exam:   Constitutional:   Vitals:    08/30/20 0402 08/30/20 0408 08/30/20 0830 08/30/20 1112   BP:  (!) 163/85 (!) 166/95 (!) 181/82   Pulse: 81 81 90 91   Resp:  16 18 18   Temp:  96.1 °F (35.6 °C) 96.8 °F (36 °C) 96.1 °F (35.6 °C)   TempSrc:  Temporal Temporal Temporal   SpO2:  98% 97% 99%   Weight:       Height:         General appearance:  Normal development and appear in no acute distress. Eye: No icterus. Neck: supple  Cardiovascular:  No lower leg edema with good pulsation. Mental Status:   Oriented to person, place, problem, and time. Memory: Good immediate recall. Intact remote memory  Normal attention span and concentration. Language: intact naming, repeating and fluency   Good fund of Knowledge. Cranial Nerves:   II:    Pupils: equal, round, reactive to light  III,IV,VI: Extra Ocular Movements are intact. No nystagmus  V: Facial sensation is intact  VII: Facial strength and movements: intact and symmetric  IX: Palate elevation is symmetric  XI: Shoulder shrug is intact  XII: Tongue movements are normal  Musculoskeletal: 5/5 in left side and right-sided weakness 4/5  Tone: Normal tone. Reflexes: Symmetric 2+ in both arms and legs. Coordination: no pronator drift, no dysmetria with FNF. Normal REM. Sensation: normal to all modalities in both arms and legs.   Gait/Posture: steady gait  No change in exam today. Data:  LABS:   Lab Results   Component Value Date     08/29/2020    K 3.6 08/29/2020    K 3.6 08/28/2020     08/29/2020    CO2 22 08/29/2020    BUN 21 08/29/2020    CREATININE 1.4 08/29/2020    GFRAA 49 08/29/2020    GFRAA >60 11/09/2011    LABGLOM 41 08/29/2020    GLUCOSE 192 08/29/2020    PHOS 4.1 08/29/2020    MG 1.70 08/27/2020    CALCIUM 8.3 08/29/2020     Lab Results   Component Value Date    WBC 15.2 08/29/2020    RBC 4.20 08/29/2020    HGB 12.6 08/29/2020    HCT 37.3 08/29/2020    MCV 88.8 08/29/2020    RDW 15.7 08/29/2020     08/29/2020     Lab Results   Component Value Date    INR 0.91 08/27/2020    PROTIME 10.6 08/27/2020       Neuroimaging was independently reviewed by me and discussed results with the patient  I reviewed blood testing and other test results and discussed results with the patient      Impression: No change  Acute right-sided weakness secondary to new left hemispheric lacunar stroke. Hypertension, not controlled  Diabetes, not controlled with diabetic retinopathy and polyneuropathy  Hyperlipidemia, not controlled  Smoker      Recommendation    Continue current supportive care  Awaiting echo  PT and OT  Aspirin  Statin  Insulin sliding scale  Blood sugar monitor and control with insulin sliding scale  Nicotine patch  Telemetry  DVT and GI prophylaxis  Stroke prevention and risk of recurrence were discussed  Discussed risk of smoking  Can be discharged tomorrow from neurology if echo showed no significant changes  No further recommendation  We will sign off. Toro Lake MD   [phone]      This dictation was generated by voice recognition computer software. Although all attempts are made to edit the dictation for accuracy, there may be errors in the transcription that are not intended.

## 2020-08-30 NOTE — PLAN OF CARE
Problem: Serum Glucose Level - Abnormal:  Goal: Ability to maintain appropriate glucose levels will improve  Description: Ability to maintain appropriate glucose levels will improve  Outcome: Ongoing      at 2110; 3 units insulin given per ssi & Lantus 30 units at 2150. See [facility] & all flowsheets. Will continue to monitor.

## 2020-08-30 NOTE — PLAN OF CARE
Assessment complete, see flow sheet. Blood sugar 115, correctional insulin parameters not met. Denies pain, no complaints voiced. NIHSS 4. BP (!) 166/95   Pulse 90   Temp 96.8 °F (36 °C) (Temporal)   Resp 18   Ht 5' 7\" (1.702 m)   Wt 191 lb (86.6 kg)   SpO2 97%   BMI 29.91 kg/m²  room air, normal sinus rhythm. Bed in lowest position, wheels locked, side rails up times 2, door open, encouraged to use call light for needs, phone and call light are within reach. Will continue to monitor.   Kaci Clarke RN, BSN

## 2020-08-30 NOTE — FLOWSHEET NOTE
Sent below message to hospitalist via [employer] now at [address], \"Pt c/o neuropathic pain in feet, unrelieved with 1 dose of Tylenol today but reported that the 1 dose of lyrica she had last night was effective in managing her pain all night last night and throughout the entire day. Can we try Lyrica dosing? \"    Message read at [address]    _______________________________      Lyrica ordered; d/w pt and will give dose x1 when verified per pharmacy. See [facility] & all flowsheets. Will continue to monitor.

## 2020-08-30 NOTE — CONSULTS
ago when she was in the hospital for medical treatment  severity: moderate      ROS:   Gen: denies fevers or chills  HEENT: +vision problems / vision loss - sees ophtho. CV: no cp, no palpitations  Resp: no dyspnea  : no dysuria  MSK: no muscle or joint pain  GI: no n/v/d  Skin: no rashes  Neuro: +rt sided weakness, no tremors  Endo: no tremors or weight changes    Past Psychiatric History:    Hosp: denies   Diagnoses: anxiety    Med trials: lexapro - not helpful, caused sexual dysfunction, wellbutrin - not helpful, lorazepam, xanax   Outpt: no prior psych. Was being managed by PCP   NSSI: denies   Suicide Attempts: denies    Substance Use History:   Nicotine: smokes cigarrettes, plans to quit at this point   Alcohol: denies   Illicits: denies    Past Medical History:   Past Medical History:   Diagnosis Date    [facility] [address]     Diabetes mellitus, type II ([facility] [address]     2005    Generalized headaches     Infertility     Insomnia     chronic vs lack of time spent to sleep    Migraine headache 11/9/2011    Mixed hyperlipidemia     Otitis media     h/o recurrent    Pelvic abscess in female 10/5/2013    Pneumonia     2004 approx. 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    Family History:   Family History   Problem Relation Age of Onset    Diabetes Mother     Diabetes Father     High Blood Pressure Father     Cancer Father         colon    Diabetes Sister     Diabetes Paternal Grandmother      Psychiatric: sister - OCD, mother - anxiety (hx of response to sertraline)      Allergies:   Allergies   Allergen Reactions    Amoxicillin Itching and Hives     Tolerates cephalosporins  Patient tolerating cefazolin (ANCEF) as of October 11, 2018  Levofloxacin Anaphylaxis    Levofloxacin Anaphylaxis    Vancomycin Shortness Of Breath, Hives and Anaphylaxis    Tape [Adhesive Tape] Other (See Comments)     Paper tape turns skin bright red. Plastic tape okay. Home Medications:   No current facility-administered medications on file prior to encounter. Current Outpatient Medications on File Prior to Encounter   Medication Sig Dispense Refill    furosemide (LASIX) 40 MG tablet Take 1 tablet by mouth 2 times daily (Patient taking differently: Take 20 mg by mouth 2 times daily ) 60 tablet 3    glucose monitoring kit (FREESTYLE) monitoring kit 1 kit by Does not apply route daily 1 kit 0    insulin glargine (LANTUS;BASAGLAR) 100 UNIT/ML injection pen Inject 15 Units into the skin nightly (Patient taking differently: Inject 30 Units into the skin nightly ) 5 pen 3    insulin lispro, 1 Unit Dial, 100 UNIT/ML SOPN Inject 0-6 Units into the skin 3 times daily (with meals) **Corrective Low Dose Algorithm**  Glucose: Dose:               No Insulin  140-199 1 Unit  200-249 2 Units  250-299 3 Units  300-349 4 Units  350-399 5 Units  Over 399 6 Units (Patient taking differently: Inject 6-12 Units into the skin 3 times daily (with meals) **Corrective Low Dose Algorithm**  Glucose: Dose:               No Insulin  140-199 1 Unit  200-249 2 Units  250-299 3 Units  300-349 4 Units  350-399 5 Units  Over 399 6 Units) 3 pen 0    lisinopril (PRINIVIL;ZESTRIL) 40 MG tablet Take 1 tablet by mouth daily 30 tablet 3    spironolactone (ALDACTONE) 50 MG tablet Take 1 tablet by mouth daily 30 tablet 3    propranolol (INDERAL LA) 60 MG extended release capsule Take 60 mg by mouth daily      Insulin Syringe-Needle U-100 30G X 5/16\" 1 ML MISC 1 each by Does not apply route daily 100 each 3    Insulin Pen Needle (B-D UF III MINI PEN NEEDLES) 31G X 5 MM MISC by Does not apply route.  100 each 6    glucose blood VI test strips (VICTORY AGM-4000 TEST) strip Test four times daily until controlled and then three times daily. Code 250.02  ONE TOUCH ULTRA MONITOR 100 strip 12       Medications:  Scheduled Meds:   insulin glargine  30 Units Subcutaneous Nightly    furosemide  40 mg Oral BID    lisinopril  40 mg Oral Daily    propranolol  60 mg Oral Daily    spironolactone  50 mg Oral Daily    sodium chloride flush  10 mL Intravenous 2 times per day    enoxaparin  40 mg Subcutaneous Daily    aspirin  81 mg Oral Daily    Or    aspirin  300 mg Rectal Daily    atorvastatin  40 mg Oral Nightly    insulin lispro  0-12 Units Subcutaneous TID WC    insulin lispro  0-6 Units Subcutaneous Nightly    nicotine  1 patch Transdermal Daily     PRN Meds:.acetaminophen, labetalol, sodium chloride flush, polyethylene glycol, promethazine **OR** ondansetron, glucose, dextrose, glucagon (rDNA), dextrose    OBJECTIVE:  .  Vitals:    08/30/20 0007 08/30/20 0402 08/30/20 0408 08/30/20 0830   BP: (!) 163/88  (!) 163/85 (!) 166/95   Pulse: 83 81 81 90   Resp: 14  16 18   Temp: 96.4 °F (35.8 °C)  96.1 °F (35.6 °C) 96.8 °F (36 °C)   TempSrc: Temporal  Temporal Temporal   SpO2: 98%  98% 97%   Weight:       Height:           MSE:   Appearance    alert, cooperative  Motor:  No abnormal movements, tics or mannerisms.   Speech    spontaneous, normal rate and normal volume  Language    0 - no aphasia, normal  Mood/Affect    Anxious / congruent to mood  Thought Process    linear, goal directed and coherent  Thought Content    intact , no suicidal ideation  Associations    logical connections  Attention/Concentration    intact  Orientation    oriented to person, place, time, and general circumstances  Memory    recent and remote memory intact  Fund of Knowledge    intact  Insight/Judgement    Good / Intact    Labs:   Recent Labs     08/27/20  1544 08/28/20  0502 08/29/20  1036   WBC 17.3* 16.3* 15.2*   HGB 13.3 12.3 12.6   HCT 38.2 36.1 37.3   MCV 87.2 86.1 88.8   * 421 441     Recent Labs     08/27/20  1544 08/28/20  0502 08/29/20  1036    139 136   K 3.4* 3.6 3.6    105 102   CO2 23 24 22   BUN 21* 15 21*   MG 1.70*  --   --    PHOS  --   --  4.1     Recent Labs     08/27/20  1544   AST 15   ALT 9*      Lab Results   Component Value Date    COLORU YELLOW 08/28/2020    NITRU Negative 08/28/2020    GLUCOSEU 250 08/28/2020    KETUA Negative 08/28/2020    UROBILINOGEN 0.2 08/28/2020    BILIRUBINUR Negative 08/28/2020     Lab Results   Component Value Date    LABA1C 9.1 08/27/2020     Lab Results   Component Value Date    .5 08/27/2020     Lab Results   Component Value Date    CHOL 219 (H) 08/28/2020    CHOL 184 11/09/2011    CHOL 174 04/18/2011     Lab Results   Component Value Date    TRIG 223 (H) 08/28/2020    TRIG 352 (H) 11/09/2011    TRIG 471 (H) 04/18/2011     Lab Results   Component Value Date    HDL 37 (L) 08/28/2020    HDL 34 (L) 11/09/2011    HDL 30 (L) 04/18/2011     Lab Results   Component Value Date    LDLCALC 137 (H) 08/28/2020    LDLCALC 90 10/06/2010     Lab Results   Component Value Date    LABVLDL 45 08/28/2020    LABVLDL 70 11/09/2011    LABVLDL 44 10/06/2010     No results found for: CHOLHDLRATIO  No results found for: TSH, X9MCNEE, A9ALBKS, THYROIDAB  No results found for: HBBONKQ0U9  Lab Results   Component Value Date    NUESLXRI53 416 08/28/2020     Lab Results   Component Value Date    FOLATE 11.70 08/28/2020       Last Drug screen: 8/28/2020: negative    Imaging:   CT Head 8/27/2020:   FINDINGS:    BRAIN/VENTRICLES: The ventricular system is within normal limits.  No    evidence of mass effect or midline shift.  There are foci of abnormal    low-attenuation within periventricular/subcortical white matter.  Tiny    nonspecific area of low attenuation approaching density of CSF identified in    the posterior left central sylvian region near the atrium of the left lateral    ventricle (images 26-30).   Finding may represent small focal area of remote insult.  No other area of abnormal attenuation of brain parenchyma is    identified.  No abnormal extra-axial fluid collections are identified.         ORBITS: The visualized portion of the orbits demonstrate no acute abnormality.         SINUSES: The visualized paranasal sinuses demonstrate no acute abnormality. There is opacification of several left mastoid air cells.  Right mastoid air    cells are well aerated.  Small filling defect within the left external    auditory canal likely related to retained cerumen.         SOFT TISSUES/SKULL:  No acute abnormality of the visualized skull or soft    tissues.  There is a focal area of nonspecific induration of subcutaneous    soft tissues in the left parietal region (image 42).  Finding could be    related to prior trauma.              Impression    Foci of abnormal low-attenuation within periventricular/subcortical white    matter.  Finding could be on the basis of changes of mild ischemic    leukoencephalopathy. [place] Desanctis, in a patient in this age group without    significant atherosclerotic calcification of intracranial vasculature, other    etiologies including changes of demyelinating process also in the    differential diagnosis.  Follow-up MRI examination may be helpful for more    complete evaluation.           MRI Brain 8/28/2020:   FINDINGS:    INTRACRANIAL STRUCTURES/VENTRICLES: There is acute infarction in the    posterior limb of the left internal capsule.  The sulci, cisterns and    ventricles are age-appropriate in size.  There are a few scattered foci of    T2/FLAIR hyperintensity in the periventricular and subcortical white matter,    likely related to minimal chronic microvascular disease. No mass effect or    midline shift. No acute intracranial hemorrhage. There is no hydrocephalus.     The sellar/suprasellar regions appear unremarkable.  The normal signal voids    within the major intracranial vessels appear maintained.         ORBITS: The visualized portion of the orbits demonstrate no acute abnormality.         SINUSES: There is scattered minimal mucosal thickening in the paranasal    sinuses.  There is severe left mastoid effusion.         BONES/SOFT TISSUES: The bone marrow signal intensity appears normal. The soft    tissues demonstrate no acute abnormality.              Impression    Acute infarction in the posterior limb of the left internal capsule.         Minimal chronic microvascular disease.         Severe left mastoid effusion.           EK2020: rate 91 bpm, Normal sinus rhythm, Possible Left atrial enlargement, precordial leads are likely reversed, QTc 450ms        Jaguar Callaway MD   Psychiatrist

## 2020-08-31 VITALS
HEIGHT: 67 IN | DIASTOLIC BLOOD PRESSURE: 83 MMHG | TEMPERATURE: 97.1 F | OXYGEN SATURATION: 99 % | BODY MASS INDEX: 31.39 KG/M2 | RESPIRATION RATE: 16 BRPM | HEART RATE: 88 BPM | SYSTOLIC BLOOD PRESSURE: 137 MMHG | WEIGHT: 200 LBS

## 2020-08-31 LAB
GLUCOSE BLD-MCNC: 141 MG/DL (ref 70–99)
GLUCOSE BLD-MCNC: 97 MG/DL (ref 70–99)
LV EF: 55 %
LVEF MODALITY: NORMAL
PERFORMED ON: ABNORMAL
PERFORMED ON: NORMAL

## 2020-08-31 PROCEDURE — 2580000003 HC RX 258: Performed by: FAMILY MEDICINE

## 2020-08-31 PROCEDURE — 6370000000 HC RX 637 (ALT 250 FOR IP): Performed by: FAMILY MEDICINE

## 2020-08-31 PROCEDURE — 6370000000 HC RX 637 (ALT 250 FOR IP): Performed by: PSYCHIATRY & NEUROLOGY

## 2020-08-31 PROCEDURE — 6360000002 HC RX W HCPCS: Performed by: FAMILY MEDICINE

## 2020-08-31 PROCEDURE — 6370000000 HC RX 637 (ALT 250 FOR IP): Performed by: HOSPITALIST

## 2020-08-31 PROCEDURE — 2500000003 HC RX 250 WO HCPCS: Performed by: FAMILY MEDICINE

## 2020-08-31 PROCEDURE — 93306 TTE W/DOPPLER COMPLETE: CPT

## 2020-08-31 PROCEDURE — 92526 ORAL FUNCTION THERAPY: CPT

## 2020-08-31 RX ORDER — AMLODIPINE BESYLATE 5 MG/1
5 TABLET ORAL DAILY
Status: DISCONTINUED | OUTPATIENT
Start: 2020-08-31 | End: 2020-08-31 | Stop reason: HOSPADM

## 2020-08-31 RX ORDER — PREGABALIN 75 MG/1
75 CAPSULE ORAL NIGHTLY
Qty: 7 CAPSULE | Refills: 0 | Status: SHIPPED | OUTPATIENT
Start: 2020-08-31 | End: 2021-11-01 | Stop reason: SDUPTHER

## 2020-08-31 RX ORDER — ATORVASTATIN CALCIUM 40 MG/1
40 TABLET, FILM COATED ORAL NIGHTLY
Qty: 30 TABLET | Refills: 3 | Status: SHIPPED | OUTPATIENT
Start: 2020-08-31 | End: 2021-07-08 | Stop reason: SDUPTHER

## 2020-08-31 RX ORDER — ASPIRIN 81 MG/1
81 TABLET ORAL DAILY
Qty: 30 TABLET | Refills: 3 | Status: SHIPPED | OUTPATIENT
Start: 2020-09-01

## 2020-08-31 RX ORDER — AMLODIPINE BESYLATE 5 MG/1
5 TABLET ORAL DAILY
Qty: 30 TABLET | Refills: 3 | Status: ON HOLD | OUTPATIENT
Start: 2020-08-31 | End: 2021-02-26 | Stop reason: HOSPADM

## 2020-08-31 RX ADMIN — LORAZEPAM 0.5 MG: 0.5 TABLET ORAL at 09:51

## 2020-08-31 RX ADMIN — SERTRALINE HYDROCHLORIDE 50 MG: 50 TABLET, FILM COATED ORAL at 09:51

## 2020-08-31 RX ADMIN — AMLODIPINE BESYLATE 5 MG: 5 TABLET ORAL at 15:16

## 2020-08-31 RX ADMIN — Medication 10 ML: at 09:54

## 2020-08-31 RX ADMIN — ASPIRIN 81 MG: 81 TABLET, COATED ORAL at 09:51

## 2020-08-31 RX ADMIN — SPIRONOLACTONE 50 MG: 25 TABLET ORAL at 09:52

## 2020-08-31 RX ADMIN — LISINOPRIL 40 MG: 20 TABLET ORAL at 09:52

## 2020-08-31 RX ADMIN — PROPRANOLOL HYDROCHLORIDE 60 MG: 60 CAPSULE, EXTENDED RELEASE ORAL at 09:52

## 2020-08-31 RX ADMIN — LABETALOL HYDROCHLORIDE 10 MG: 5 INJECTION, SOLUTION INTRAVENOUS at 12:22

## 2020-08-31 RX ADMIN — FUROSEMIDE 40 MG: 40 TABLET ORAL at 09:52

## 2020-08-31 ASSESSMENT — PAIN DESCRIPTION - PAIN TYPE
TYPE: ACUTE PAIN;CHRONIC PAIN
TYPE: ACUTE PAIN
TYPE: ACUTE PAIN
TYPE: NEUROPATHIC PAIN

## 2020-08-31 ASSESSMENT — PAIN DESCRIPTION - LOCATION
LOCATION: FOOT

## 2020-08-31 ASSESSMENT — PAIN SCALES - GENERAL
PAINLEVEL_OUTOF10: 2

## 2020-08-31 ASSESSMENT — PAIN DESCRIPTION - ORIENTATION
ORIENTATION: RIGHT;LEFT

## 2020-08-31 NOTE — PROGRESS NOTES
Data- discharge order received, pt verbalized agreement to discharge, disposition to previous residence, no needs for HHC/DME. Action- discharge instructions prepared and given to pt,  pt verbalized understanding. Medication information packet given r/t NEW and/or CHANGED prescriptions emphasizing name/purpose/side effects, pt verbalized understanding. Discharge instruction summary: Diet- carb control, Activity- as carla, Primary Care Physician as follows: No primary care provider on file. None f/u appointment with in one week  Response- Pt belongings gathered, IV removed. Disposition is home (no HHC/DME needs), transported with belongings, taken to lobby via w/c w/ nurse, no complications.

## 2020-08-31 NOTE — DISCHARGE SUMMARY
Hospitalist Progress Note    Patient:  Marcus Platt  Unit/Bed:[id number]-[id number]/3371-02   YOB: 1975       MRN: [id number] Acct: [de-identified]  PCP: No primary care provider on file. Date of Admission: 8/27/2020  --------------------------     Discharge Date:   8/31/2020    Admitting Physician: Kan Mckeon MD     Discharge Physician: Prashanth Holm MD       Consults:     IP CONSULT TO HOSPITALIST  IP CONSULT TO NEUROLOGY  IP CONSULT TO PHYSICAL MEDICINE REHAB  IP CONSULT TO PSYCHIATRY    Disposition: Home    Condition at Discharge: Stable    Code Status:  Full Code       Patient Instructions:    Discharge lab/important testing/finding that need follow up : Follow-up with ophthalmology, PCP to for further prescription of Lyrica. Outpatient psychiatry evaluation  -Follow-up with thyroid nodule  Activity: activity as tolerated  Diet: DIET CARB CONTROL; Carb Control: 4 carb choices (60 gms)/meal      Follow-up visits:   No follow-up provider specified. Discharge Medications:      Daphne Phan   Home Medication Instructions Q:[id number]    Printed on:08/31/20 1612   Medication Information                      amLODIPine (NORVASC) 5 MG tablet  Take 1 tablet by mouth daily             aspirin 81 MG EC tablet  Take 1 tablet by mouth daily             atorvastatin (LIPITOR) 40 MG tablet  Take 1 tablet by mouth nightly             furosemide (LASIX) 40 MG tablet  Take 1 tablet by mouth 2 times daily             glucose blood VI test strips (VICTORY AGM-4000 TEST) strip  Test four times daily until controlled and then three times daily.   Code 250.02  ONE TOUCH ULTRA MONITOR             glucose monitoring kit (FREESTYLE) monitoring kit  1 kit by Does not apply route daily             insulin glargine (LANTUS;BASAGLAR) 100 UNIT/ML injection pen  Inject 30 Units into the skin nightly             insulin lispro, 1 Unit Dial, 100 UNIT/ML SOPN  Inject 0-6 Units into the skin 3 times daily (with meals) **Corrective Low Dose Algorithm**  Glucose: Dose:               No Insulin  140-199 1 Unit  200-249 2 Units  250-299 3 Units  300-349 4 Units  350-399 5 Units  Over 399 6 Units             Insulin Pen Needle (B-D UF III MINI PEN NEEDLES) 31G X 5 MM MISC  by Does not apply route. Insulin Syringe-Needle U-100 30G X 5/16\" 1 ML MISC  1 each by Does not apply route daily             lisinopril (PRINIVIL;ZESTRIL) 40 MG tablet  Take 1 tablet by mouth daily             LORazepam (ATIVAN) 0.5 MG tablet  Take 1 tablet by mouth 2 times daily as needed (panic attacks) for up to 30 days. pregabalin (LYRICA) 75 MG capsule  Take 1 capsule by mouth nightly for 7 days. propranolol (INDERAL LA) 60 MG extended release capsule  Take 60 mg by mouth daily             sertraline (ZOLOFT) 50 MG tablet  Take 1 tablet by mouth daily             spironolactone (ALDACTONE) 50 MG tablet  Take 1 tablet by mouth daily                       Time Spent on discharge is 40 in the examination, evaluation, counseling and review of medications and discharge plan  Chief Complaint:     Right-sided weakness    Discharge diagnoses and Hospital Course:     Ruby Clancy is a 39 y.o. female hospitalized on 8/27/2020 with right-sided weakness, found to have acute CVA on MRI          Acute CVA. MRI showed acute infarction in the posterior limb of the left internal capsule  -No flow-limiting stenosis on CTA of the head and neck  - - echocardiogram showed negative bubble study, normal EF, no intracardiac thrombus  -Was seen by neurology team, recommendation of  aspirin, statin therapy and secondary risk modification.  -Evaluated by [facility] consult to have high functional status.         Uncontrolled anxiety  -Appreciate psychiatry team input, short course of benzodiazepines along with Zoloft.  -Outpatient follow-up with psychiatry team     Uncontrolled essential hypertension, likely secondary to the stroke  -Continue home medication  -Amlodipine added.  -Outpatient follow-up with PCP           Uncontrolled insulin-dependent diabetes mellitus type 2 with nephropathy  -Continue current insulin therapy,  - A1`c  9.1  -Continue monitoring blood sugar, overall reading acceptable  -Symptoms of diabetic neuropathy improved significantly with the nightly dose of Lyrica. Short supply given. Patient to follow-up with PCP for further refills        Chronic kidney disease stage III  -Monitor creatinine while inpatient        Multinodular thyroid gland/MRI alluded to incidental finding  -Follow-up with PCP for outpatient thyroid ultrasound        Tobacco dependence  -NicoDerm, advised to quit        Chronic diastolic congestive heart failure  -Stable             Code Status: Full Code         DVT prophylaxis:  lovenox     Disposition: Home likely tomorrow, pending echo    I discussed my thought processes at length with patient/family and patient understood. Question and concerns  Addressed      Discussed with RN     ----------------      Subjective:     Patient seen and examined  Overnight events noted  RN and ancillary staff note reviewed    No new complaint today, no major overnight events    Diet: DIET CARB CONTROL; Carb Control: 4 carb choices (60 gms)/meal    OBJECTIVE     Exam:  /83   Pulse 88   Temp 97.1 °F (36.2 °C) (Temporal)   Resp 16   Ht 5' 7\" (1.702 m)   Wt 200 lb (90.7 kg)   SpO2 99%   BMI 31.32 kg/m²          Gen: Not in distress. Alert. Head: Normocephalic. Atraumatic. Eyes: Conjunctivae/corneas clear. ENT: Oral mucosa moist  Neck: No JVD. No obvious thyromegaly. CVS: Nml S1S2, no murmur  , RRR  Pulmomary: Clear bilaterally. No crackles. No wheezes. Gastrointestinal: Soft, non tender, non distend, . Musculoskeletal: No edema. Warm  Neuro: Improved weakness in the right side no focal deficit. Moves extremity spontaneously. Psychiatry: Appropriate affect. Not agitated.   .        Medications: Reviewed    Infusion Medications    dextrose       Scheduled Medications    amLODIPine  5 mg Oral Daily    sertraline  50 mg Oral Daily    pregabalin  75 mg Oral Nightly    insulin glargine  30 Units Subcutaneous Nightly    furosemide  40 mg Oral BID    lisinopril  40 mg Oral Daily    propranolol  60 mg Oral Daily    spironolactone  50 mg Oral Daily    sodium chloride flush  10 mL Intravenous 2 times per day    enoxaparin  40 mg Subcutaneous Daily    aspirin  81 mg Oral Daily    Or    aspirin  300 mg Rectal Daily    atorvastatin  40 mg Oral Nightly    insulin lispro  0-12 Units Subcutaneous TID WC    insulin lispro  0-6 Units Subcutaneous Nightly    nicotine  1 patch Transdermal Daily     PRN Meds: LORazepam, acetaminophen, labetalol, sodium chloride flush, polyethylene glycol, promethazine **OR** ondansetron, glucose, dextrose, glucagon (rDNA), dextrose      Intake/Output Summary (Last 24 hours) at 8/31/2020 1612  Last data filed at 8/30/2020 1800  Gross per 24 hour   Intake 360 ml   Output --   Net 360 ml             Labs:   Recent Labs     08/29/20  1036   WBC 15.2*   HGB 12.6   HCT 37.3        Recent Labs     08/29/20  1036      K 3.6      CO2 22   BUN 21*   CREATININE 1.4*   CALCIUM 8.3   PHOS 4.1     No results for input(s): AST, ALT, BILIDIR, BILITOT, ALKPHOS in the last 72 hours. No results for input(s): INR in the last 72 hours. No results for input(s): Arcadio Shown in the last 72 hours. Urinalysis:      Lab Results   Component Value Date    NITRU Negative 08/28/2020    WBCUA 2 08/28/2020    RBCUA 7 08/28/2020    BLOODU SMALL 08/28/2020    SPECGRAV 1.020 08/28/2020    GLUCOSEU 250 08/28/2020       Radiology:  MRI brain without contrast   Final Result   Acute infarction in the posterior limb of the left internal capsule. Minimal chronic microvascular disease. Severe left mastoid effusion. XR CHEST PORTABLE   Final Result   No acute process. CTA HEAD NECK W CONTRAST   Final Result   1. No acute arterial abnormality or hemodynamically significant arterial   stenosis in the head or neck. 2. Multinodular thyroid gland with a 1.6 cm dominant left thyroid nodule. Nonemergent follow-up outpatient thyroid ultrasound is recommended for   further evaluation. CT Head WO Contrast   Final Result   Foci of abnormal low-attenuation within periventricular/subcortical white   matter. Finding could be on the basis of changes of mild ischemic   leukoencephalopathy. However, in a patient in this age group without   significant atherosclerotic calcification of intracranial vasculature, other   etiologies including changes of demyelinating process also in the   differential diagnosis. Follow-up MRI examination may be helpful for more   complete evaluation. Critical results were called by Shannan Flores. Yuko Persaud MD to Dr. Bret Doll on   8/27/2020 at 16:02.                      Electronically signed by Damaris Mcneal MD on 8/31/2020 at 4:12 PM

## 2020-08-31 NOTE — PROGRESS NOTES
Speech Language Pathology  Dysphagia Treatment Note    Name:  Lonny Dennis  :   1975  Medical Diagnosis:  Right sided numbness [R20.0]  Right sided numbness [R20.0]  Treatment Diagnosis: Oropharyngeal Dysphagia  Pain level: reported neuropathy pain but denied need for intervention at this time    Current Diet Level: Regular texture diet with thin liquids   Tolerance of Current Diet Level: No noted difficulty with swallowing. Pt reports tolerating diet. Assessment of Texture Tolerance:  -Impressions: Pt seen sitting upright in bed, alert and cooperative. She reported she has been tolerating current diet without difficulty. Pt agreeable to snack of regular/dry texture benja crackers and thin liquids via cup. Pt demonstrated functional mastication within appropriate time frame. Bolus manipulation and formation were adequate with timely AP transit and good oral clearance. Pt also demonstrated good bolus control. Pharyngeal phase was characterized by timely swallow function and good laryngeal elevation was felt upon manual palpation. Pt tolerated single and sequential sips of thin liquids via cup without difficulty and adequate coordination of successive swallows. At this time, recommend continue current diet. Pt does not require additional speech services due to tolerating diet and meeting goals. Pt was oriented x4 during session. She denies any changes with speech/language/cognition. Speech was 100% intelligible.      -Compensatory strategies:  90 degree positioning with all p.o. intake; small bites/sips; alternate textures through meal; reduce rate of intake    Diet and Treatment Recommendations:  Regular texture diet with thin liquids     ST.) Pt will tolerate recommended diet without s/s of aspiration (GOAL MET, 2020)    Plan:  Discharge from dysphagia therapy due to meeting goals and tolerating diet    Patient/Family Education:Education given to the Pt and nurse, who verbalized

## 2020-09-01 NOTE — ADT AUTH CERT
Utilization Reviews         Stroke: Ischemic - Care Day 4 (8/30/2020) by Kodi Mcbride RN         Review Status  Review Entered    Completed  9/1/2020 12:15        Criteria Review       Care Day: 4 Care Date: 8/30/2020 Level of Care:    Guideline Day 2    Level Of Care    (X) Stroke unit, ICU, telemetry, or floor    Clinical Status    (X) * Hemodynamic stability    (X) * Mental status at baseline or stable    (X) * Neurologic deficits absent or stable    (X) * Unimpaired swallowing    (X) * Up to chair    (X) * Feeding with or without assistance    Activity    ( ) * Up to chair    Routes    ( ) * Oral hydration, medications    (X) * Advance diet as tolerated    Interventions    (X) Neurologic checks    Medications    (X) Antithrombotic therapy    (X) Blood pressure control    * Milestone    Additional Notes    8/30/2020       Care day 4       PCU       VS: 96.8 18 90 166/95 97% RA       Internal medicine progress notes:  Assessment:    Acute CVA. MRI showed acute infarction in the posterior limb of the left internal capsule    -No flow-limiting stenosis on CTA of the head and neck    - neurology input appreciate it    - patient agree to take the ASA. She will call retina specialist to see if it is okay if she uses Plavix.     -Follow-up echocardiogram    -Overall symptoms improved, inpatient rehab input greatly appreciated, patient still functional anticipated IRU              Uncontrolled anxiety    Psychiatry consult         Uncontrolled essential hypertension, likely secondary to the stroke    -Blood pressure reading acceptable, continue monitoring                    Uncontrolled insulin-dependent diabetes mellitus type 2 with nephropathy    -Continue current insulin therapy,  - A1`c  9.1    - Monitor blood sugar              Chronic kidney disease stage III    -Monitor creatinine while inpatient              Multinodular thyroid gland/MRI alluded to incidental finding    -Follow-up with PCP for outpatient thyroid ultrasound              Tobacco dependence    -NicoDerm, advised to quit          Chronic diastolic congestive heart failure    -Stable         Code Status: Full Code           DVT prophylaxis:  lovenox          Meds:    insulin glargine 30 Units Subcutaneous Nightly    · furosemide 40 mg Oral BID    · lisinopril 40 mg Oral Daily    · propranolol 60 mg Oral Daily    · spironolactone 50 mg Oral Daily    · sodium chloride flush 10 mL Intravenous 2 times per day    · enoxaparin 40 mg Subcutaneous Daily    · aspirin 81 mg Oral Daily      Or    · aspirin 300 mg Rectal Daily    · atorvastatin 40 mg Oral Nightly    · insulin lispro 0-12 Units Subcutaneous TID WC    · insulin lispro 0-6 Units Subcutaneous Nightly    · nicotine 1 patch Transdermal Daily       PRN meds:    Tylenol 650 mg q 6 hrs prn po x 2    Ativan 0.5 mg 2 x daily prn po x 2    Labetalol 10 mg q 4 hrs prn iv SBP> 170 or DBP >90 x 3           Stroke: Ischemic - Care Day 3 (8/29/2020) by Samantha López RN         Review Status  Review Entered    Completed  9/1/2020 12:15        Criteria Review       Care Day: 3 Care Date: 8/29/2020 Level of Care:    Guideline Day 2    Level Of Care    (X) Stroke unit, ICU, telemetry, or floor    Clinical Status    (X) * Hemodynamic stability    (X) * Mental status at baseline or stable    (X) * Neurologic deficits absent or stable    (X) * Unimpaired swallowing    (X) * Up to chair    (X) * Feeding with or without assistance    Activity    ( ) * Up to chair    Routes    ( ) * Oral hydration, medications    (X) * Advance diet as tolerated    Interventions    (X) Neurologic checks    Medications    (X) Antithrombotic therapy    (X) Blood pressure control    * Milestone    Additional Notes    8/29/2020       Care day 3       PCU       VS: 142/90 88 96.1 18 98% RA       Internal medicine progress notes:  Hospital Course:         Larry Boggs is a 39 y.o. female hospitalized on 8/27/2020 with right-sided weakness, found to have acute CVA on MRI         Assessment:         Acute CVA. MRI showed acute infarction in the posterior limb of the left internal capsule    -No flow-limiting stenosis on CTA of the head and neck    - neurology input appreciate it    - patient agree to take the ASA. She will call retina specialist to see if it is okay if she uses Plavix. -Follow-up echocardiogram    -Overall symptoms improved, inpatient rehab input greatly appreciated, patient still functional anticipated IRU              Uncontrolled anxiety    Psychiatry consult         Uncontrolled essential hypertension, likely secondary to the stroke    -Blood pressure reading acceptable, continue monitoring                    Uncontrolled insulin-dependent diabetes mellitus type 2 with nephropathy    -Continue current insulin therapy,  - A1`c  9.1    - Monitor blood sugar              Chronic kidney disease stage III    -Monitor creatinine while inpatient              Multinodular thyroid gland/MRI alluded to incidental finding    -Follow-up with PCP for outpatient thyroid ultrasound              Tobacco dependence    -NicoDerm, advised to quit              Chronic diastolic congestive heart failure    -Stable         Code Status: Full Code          DVT prophylaxis:  lovenox       Neurology progress note:  Recommendation    Long discussion with the patient today regarding pros and cons of taking baby aspirin daily with her history of diabetic retinopathy.  She agreed to take baby aspirin today and follow-up with her ophthalmologist on Monday for clearance.     Continue Lipitor    Insulin sliding scale    Blood sugar monitor and control closely at home    Nicotine patch    Telemetry    PT and OT    Should consider repeating echo    Blood pressure monitor and continue current blood pressure medications    Long discussion today with the patient regarding secondary stroke prevention, risk of recurrence and risk of poorly controlled diabetes, hypertension hyperlipidemia    Can be discharged when medically stable with outpatient PT and OT    MDM: High complexity due to high risk of stroke recurrence and poorly controlled diabetes and hypertension. PMR consult:  Assessment and Plan:    Acute ischemic infarct: ASA, statin.  PT/OT.     Diabetes    CKD    Neuropathy    HLD         Dispo: Patient is too functional to anticipate and ARU approval through [address] [place] [address] her current functional level and support at home, I agree with her request for home with outpatient therapies.  Discharge dependent upon neurology and primary team approval.       Abnormal labs:  WBC 15.2    Bun 21    Creatinine 1.4       Meds:  · insulin glargine 30 Units Subcutaneous Nightly    · furosemide 40 mg Oral BID    · lisinopril 40 mg Oral Daily    · propranolol 60 mg Oral Daily    · spironolactone 50 mg Oral Daily    · sodium chloride flush 10 mL Intravenous 2 times per day    · enoxaparin 40 mg Subcutaneous Daily    · aspirin 81 mg Oral Daily      Or    · aspirin 300 mg Rectal Daily    · atorvastatin 40 mg Oral Nightly    · insulin lispro 0-12 Units Subcutaneous TID WC    · insulin lispro 0-6 Units Subcutaneous Nightly    · nicotine 1 patch Transdermal Daily       PRN meds:    Tylenol 650 mg q 6 hrs prn po x 1

## 2020-09-08 ENCOUNTER — HOSPITAL ENCOUNTER (OUTPATIENT)
Age: 45
Discharge: HOME | End: 2020-09-08
Payer: COMMERCIAL

## 2020-09-08 LAB
ANION GAP SERPL CALCULATED.3IONS-SCNC: 10 MMOL/L (ref 3–16)
BUN BLDV-MCNC: 22 MG/DL (ref 7–20)
CALCIUM SERPL-MCNC: 8.6 MG/DL (ref 8.3–10.6)
CHLORIDE BLD-SCNC: 106 MMOL/L (ref 99–110)
CO2: 23 MMOL/L (ref 21–32)
CREAT SERPL-MCNC: 1.6 MG/DL (ref 0.6–1.1)
GFR AFRICAN AMERICAN: 42
GFR NON-AFRICAN AMERICAN: 35
GLUCOSE BLD-MCNC: 179 MG/DL (ref 70–99)
POTASSIUM SERPL-SCNC: 4.1 MMOL/L (ref 3.5–5.1)
SODIUM BLD-SCNC: 139 MMOL/L (ref 136–145)

## 2020-09-08 PROCEDURE — 36415 COLL VENOUS BLD VENIPUNCTURE: CPT

## 2020-09-08 PROCEDURE — 80048 BASIC METABOLIC PNL TOTAL CA: CPT

## 2020-11-03 PROBLEM — I10 ESSENTIAL HYPERTENSION: Status: RESOLVED | Noted: 2020-11-03 | Resolved: 2020-11-03

## 2020-11-30 ENCOUNTER — HOSPITAL ENCOUNTER (OUTPATIENT)
Age: 45
Discharge: HOME | End: 2020-11-30
Payer: COMMERCIAL

## 2020-11-30 LAB
ALBUMIN SERPL-MCNC: 3.1 G/DL (ref 3.4–5)
ANION GAP SERPL CALCULATED.3IONS-SCNC: 11 MMOL/L (ref 3–16)
BUN BLDV-MCNC: 38 MG/DL (ref 7–20)
CALCIUM SERPL-MCNC: 8.8 MG/DL (ref 8.3–10.6)
CHLORIDE BLD-SCNC: 99 MMOL/L (ref 99–110)
CO2: 23 MMOL/L (ref 21–32)
CREAT SERPL-MCNC: 1.6 MG/DL (ref 0.6–1.1)
CREATININE URINE: 48.1 MG/DL (ref 28–259)
GFR AFRICAN AMERICAN: 42
GFR NON-AFRICAN AMERICAN: 35
GLUCOSE BLD-MCNC: 448 MG/DL (ref 70–99)
HCT VFR BLD CALC: 35 % (ref 36–48)
HEMOGLOBIN: 11.9 G/DL (ref 12–16)
MCH RBC QN AUTO: 29.6 PG (ref 26–34)
MCHC RBC AUTO-ENTMCNC: 34.1 G/DL (ref 31–36)
MCV RBC AUTO: 87 FL (ref 80–100)
PDW BLD-RTO: 13.6 % (ref 12.4–15.4)
PHOSPHORUS: 3.7 MG/DL (ref 2.5–4.9)
PLATELET # BLD: 384 K/UL (ref 135–450)
PMV BLD AUTO: 9.8 FL (ref 5–10.5)
POTASSIUM SERPL-SCNC: 3.9 MMOL/L (ref 3.5–5.1)
PROTEIN PROTEIN: 600 MG/DL
RBC # BLD: 4.02 M/UL (ref 4–5.2)
SODIUM BLD-SCNC: 133 MMOL/L (ref 136–145)
WBC # BLD: 14.9 K/UL (ref 4–11)

## 2020-11-30 PROCEDURE — 84156 ASSAY OF PROTEIN URINE: CPT

## 2020-11-30 PROCEDURE — 80069 RENAL FUNCTION PANEL: CPT

## 2020-11-30 PROCEDURE — 85027 COMPLETE CBC AUTOMATED: CPT

## 2020-11-30 PROCEDURE — 82570 ASSAY OF URINE CREATININE: CPT

## 2020-11-30 PROCEDURE — 36415 COLL VENOUS BLD VENIPUNCTURE: CPT

## 2020-12-04 PROBLEM — R80.0 ISOLATED PROTEINURIA: Status: ACTIVE | Noted: 2020-12-04

## 2021-02-23 ENCOUNTER — HOSPITAL ENCOUNTER (INPATIENT)
Age: 46
LOS: 3 days | Discharge: HOME | DRG: 194 | End: 2021-02-26
Attending: EMERGENCY MEDICINE | Admitting: INTERNAL MEDICINE
Payer: COMMERCIAL

## 2021-02-23 ENCOUNTER — APPOINTMENT (OUTPATIENT)
Dept: ULTRASOUND IMAGING | Age: 46
DRG: 194 | End: 2021-02-23
Payer: COMMERCIAL

## 2021-02-23 ENCOUNTER — APPOINTMENT (OUTPATIENT)
Dept: GENERAL RADIOLOGY | Age: 46
DRG: 194 | End: 2021-02-23
Payer: COMMERCIAL

## 2021-02-23 DIAGNOSIS — R06.00 DYSPNEA AND RESPIRATORY ABNORMALITIES: ICD-10-CM

## 2021-02-23 DIAGNOSIS — R06.89 DYSPNEA AND RESPIRATORY ABNORMALITIES: ICD-10-CM

## 2021-02-23 DIAGNOSIS — J81.0 ACUTE PULMONARY EDEMA (HCC): ICD-10-CM

## 2021-02-23 DIAGNOSIS — N18.31 STAGE 3A CHRONIC KIDNEY DISEASE (HCC): ICD-10-CM

## 2021-02-23 DIAGNOSIS — N18.30 STAGE 3 CHRONIC KIDNEY DISEASE, UNSPECIFIED WHETHER STAGE 3A OR 3B CKD (HCC): ICD-10-CM

## 2021-02-23 DIAGNOSIS — Z86.73 HISTORY OF CVA (CEREBROVASCULAR ACCIDENT): Primary | ICD-10-CM

## 2021-02-23 PROBLEM — I50.33 ACUTE ON CHRONIC DIASTOLIC HEART FAILURE (HCC): Status: ACTIVE | Noted: 2021-02-23

## 2021-02-23 LAB
ALBUMIN SERPL-MCNC: 2.8 G/DL (ref 3.4–5)
ALP BLD-CCNC: 190 U/L (ref 40–129)
ALT SERPL-CCNC: 16 U/L (ref 10–40)
ANION GAP SERPL CALCULATED.3IONS-SCNC: 10 MMOL/L (ref 3–16)
AST SERPL-CCNC: 22 U/L (ref 15–37)
BASE EXCESS VENOUS: -4.6 MMOL/L (ref -3–3)
BASOPHILS ABSOLUTE: 0.1 K/UL (ref 0–0.2)
BASOPHILS RELATIVE PERCENT: 0.9 %
BILIRUB SERPL-MCNC: <0.2 MG/DL (ref 0–1)
BILIRUBIN DIRECT: <0.2 MG/DL (ref 0–0.3)
BILIRUBIN URINE: NEGATIVE
BILIRUBIN, INDIRECT: ABNORMAL MG/DL (ref 0–1)
BLOOD, URINE: ABNORMAL
BUN BLDV-MCNC: 35 MG/DL (ref 7–20)
CALCIUM SERPL-MCNC: 8.7 MG/DL (ref 8.3–10.6)
CARBOXYHEMOGLOBIN: 8 % (ref 0–1.5)
CHLORIDE BLD-SCNC: 111 MMOL/L (ref 99–110)
CLARITY: CLEAR
CO2: 21 MMOL/L (ref 21–32)
COLOR: YELLOW
CREAT SERPL-MCNC: 2.3 MG/DL (ref 0.6–1.1)
EKG ATRIAL RATE: 83 BPM
EKG DIAGNOSIS: NORMAL
EKG P AXIS: 31 DEGREES
EKG P-R INTERVAL: 136 MS
EKG Q-T INTERVAL: 384 MS
EKG QRS DURATION: 84 MS
EKG QTC CALCULATION (BAZETT): 451 MS
EKG R AXIS: 46 DEGREES
EKG T AXIS: 126 DEGREES
EKG VENTRICULAR RATE: 83 BPM
EOSINOPHILS ABSOLUTE: 1.1 K/UL (ref 0–0.6)
EOSINOPHILS RELATIVE PERCENT: 6.9 %
EPITHELIAL CELLS, UA: 3 /HPF (ref 0–5)
FERRITIN: 52.2 NG/ML (ref 15–150)
FOLATE: 6.06 NG/ML (ref 4.78–24.2)
GFR AFRICAN AMERICAN: 28
GFR NON-AFRICAN AMERICAN: 23
GLUCOSE BLD-MCNC: 101 MG/DL (ref 70–99)
GLUCOSE BLD-MCNC: 106 MG/DL (ref 70–99)
GLUCOSE BLD-MCNC: 113 MG/DL (ref 70–99)
GLUCOSE BLD-MCNC: 145 MG/DL (ref 70–99)
GLUCOSE BLD-MCNC: 159 MG/DL (ref 70–99)
GLUCOSE BLD-MCNC: 49 MG/DL (ref 70–99)
GLUCOSE BLD-MCNC: 54 MG/DL (ref 70–99)
GLUCOSE BLD-MCNC: 55 MG/DL (ref 70–99)
GLUCOSE BLD-MCNC: 61 MG/DL (ref 70–99)
GLUCOSE BLD-MCNC: 65 MG/DL (ref 70–99)
GLUCOSE URINE: 100 MG/DL
HCO3 VENOUS: 20 MMOL/L (ref 23–29)
HCT VFR BLD CALC: 30.4 % (ref 36–48)
HCT VFR BLD CALC: 30.6 % (ref 36–48)
HEMOGLOBIN: 9.7 G/DL (ref 12–16)
HYALINE CASTS: 5 /LPF (ref 0–8)
IMMATURE RETIC FRACT: 0.52 (ref 0.21–0.37)
IRON SATURATION: 12 % (ref 15–50)
IRON: 36 UG/DL (ref 37–145)
KETONES, URINE: NEGATIVE MG/DL
LACTIC ACID, SEPSIS: 0.7 MMOL/L (ref 0.4–1.9)
LEUKOCYTE ESTERASE, URINE: NEGATIVE
LYMPHOCYTES ABSOLUTE: 2.9 K/UL (ref 1–5.1)
LYMPHOCYTES RELATIVE PERCENT: 18.7 %
MAGNESIUM: 1.9 MG/DL (ref 1.8–2.4)
MCH RBC QN AUTO: 27.9 PG (ref 26–34)
MCHC RBC AUTO-ENTMCNC: 31.7 G/DL (ref 31–36)
MCV RBC AUTO: 87.9 FL (ref 80–100)
METHEMOGLOBIN VENOUS: 0.2 %
MICROSCOPIC EXAMINATION: YES
MONOCYTES ABSOLUTE: 1.3 K/UL (ref 0–1.3)
MONOCYTES RELATIVE PERCENT: 8.4 %
NEUTROPHILS ABSOLUTE: 10.1 K/UL (ref 1.7–7.7)
NEUTROPHILS RELATIVE PERCENT: 65.1 %
NITRITE, URINE: NEGATIVE
O2 CONTENT, VEN: 13 VOL %
O2 SAT, VEN: 100 %
O2 THERAPY: ABNORMAL
PCO2, VEN: 34.3 MMHG (ref 40–50)
PDW BLD-RTO: 16.3 % (ref 12.4–15.4)
PERFORMED ON: ABNORMAL
PH UA: 6 (ref 5–8)
PH VENOUS: 7.38 (ref 7.35–7.45)
PLATELET # BLD: 370 K/UL (ref 135–450)
PMV BLD AUTO: 9 FL (ref 5–10.5)
PO2, VEN: 157 MMHG (ref 25–40)
POTASSIUM REFLEX MAGNESIUM: 3.3 MMOL/L (ref 3.5–5.1)
PRO-BNP: 1304 PG/ML (ref 0–124)
PROCALCITONIN: 0.13 NG/ML (ref 0–0.15)
PROTEIN UA: >300 MG/DL
RBC # BLD: 3.46 M/UL (ref 4–5.2)
RBC UA: 8 /HPF (ref 0–4)
RETICULOCYTE ABSOLUTE COUNT: 0.08 M/UL (ref 0.02–0.1)
RETICULOCYTE COUNT PCT: 2.23 % (ref 0.5–2.18)
SARS-COV-2, NAAT: NOT DETECTED
SODIUM BLD-SCNC: 142 MMOL/L (ref 136–145)
SPECIFIC GRAVITY UA: 1.01 (ref 1–1.03)
TCO2 CALC VENOUS: 47 MMOL/L
TOTAL IRON BINDING CAPACITY: 298 UG/DL (ref 260–445)
TOTAL PROTEIN: 6.7 G/DL (ref 6.4–8.2)
TROPONIN: 0.06 NG/ML
TROPONIN: 0.07 NG/ML
TROPONIN: 0.07 NG/ML
TROPONIN: 0.09 NG/ML
URINE TYPE: ABNORMAL
UROBILINOGEN, URINE: 0.2 E.U./DL
VITAMIN B-12: 401 PG/ML (ref 211–911)
WBC # BLD: 15.5 K/UL (ref 4–11)
WBC UA: 3 /HPF (ref 0–5)

## 2021-02-23 PROCEDURE — 82728 ASSAY OF FERRITIN: CPT

## 2021-02-23 PROCEDURE — 84145 PROCALCITONIN (PCT): CPT

## 2021-02-23 PROCEDURE — 82746 ASSAY OF FOLIC ACID SERUM: CPT

## 2021-02-23 PROCEDURE — 80048 BASIC METABOLIC PNL TOTAL CA: CPT

## 2021-02-23 PROCEDURE — 81001 URINALYSIS AUTO W/SCOPE: CPT

## 2021-02-23 PROCEDURE — 80076 HEPATIC FUNCTION PANEL: CPT

## 2021-02-23 PROCEDURE — 71046 X-RAY EXAM CHEST 2 VIEWS: CPT

## 2021-02-23 PROCEDURE — 83735 ASSAY OF MAGNESIUM: CPT

## 2021-02-23 PROCEDURE — 87449 NOS EACH ORGANISM AG IA: CPT

## 2021-02-23 PROCEDURE — 83880 ASSAY OF NATRIURETIC PEPTIDE: CPT

## 2021-02-23 PROCEDURE — 96375 TX/PRO/DX INJ NEW DRUG ADDON: CPT

## 2021-02-23 PROCEDURE — 85025 COMPLETE CBC W/AUTO DIFF WBC: CPT

## 2021-02-23 PROCEDURE — 36415 COLL VENOUS BLD VENIPUNCTURE: CPT

## 2021-02-23 PROCEDURE — 93010 ELECTROCARDIOGRAM REPORT: CPT | Performed by: INTERNAL MEDICINE

## 2021-02-23 PROCEDURE — 83036 HEMOGLOBIN GLYCOSYLATED A1C: CPT

## 2021-02-23 PROCEDURE — 2580000003 HC RX 258: Performed by: EMERGENCY MEDICINE

## 2021-02-23 PROCEDURE — 6360000002 HC RX W HCPCS: Performed by: INTERNAL MEDICINE

## 2021-02-23 PROCEDURE — 6370000000 HC RX 637 (ALT 250 FOR IP): Performed by: EMERGENCY MEDICINE

## 2021-02-23 PROCEDURE — 83605 ASSAY OF LACTIC ACID: CPT

## 2021-02-23 PROCEDURE — 87040 BLOOD CULTURE FOR BACTERIA: CPT

## 2021-02-23 PROCEDURE — 2580000003 HC RX 258: Performed by: INTERNAL MEDICINE

## 2021-02-23 PROCEDURE — 93005 ELECTROCARDIOGRAM TRACING: CPT | Performed by: EMERGENCY MEDICINE

## 2021-02-23 PROCEDURE — 83540 ASSAY OF IRON: CPT

## 2021-02-23 PROCEDURE — 6370000000 HC RX 637 (ALT 250 FOR IP): Performed by: INTERNAL MEDICINE

## 2021-02-23 PROCEDURE — 76770 US EXAM ABDO BACK WALL COMP: CPT

## 2021-02-23 PROCEDURE — 2060000000 HC ICU INTERMEDIATE R&B

## 2021-02-23 PROCEDURE — 6360000002 HC RX W HCPCS: Performed by: EMERGENCY MEDICINE

## 2021-02-23 PROCEDURE — 85045 AUTOMATED RETICULOCYTE COUNT: CPT

## 2021-02-23 PROCEDURE — 87635 SARS-COV-2 COVID-19 AMP PRB: CPT

## 2021-02-23 PROCEDURE — 6360000002 HC RX W HCPCS: Performed by: FAMILY MEDICINE

## 2021-02-23 PROCEDURE — 83550 IRON BINDING TEST: CPT

## 2021-02-23 PROCEDURE — 84484 ASSAY OF TROPONIN QUANT: CPT

## 2021-02-23 PROCEDURE — 82803 BLOOD GASES ANY COMBINATION: CPT

## 2021-02-23 PROCEDURE — 82607 VITAMIN B-12: CPT

## 2021-02-23 PROCEDURE — 96365 THER/PROPH/DIAG IV INF INIT: CPT

## 2021-02-23 PROCEDURE — 99283 EMERGENCY DEPT VISIT LOW MDM: CPT

## 2021-02-23 RX ORDER — OXYCODONE HYDROCHLORIDE 5 MG/1
5 TABLET ORAL EVERY 6 HOURS PRN
Status: DISCONTINUED | OUTPATIENT
Start: 2021-02-23 | End: 2021-02-26 | Stop reason: HOSPADM

## 2021-02-23 RX ORDER — FUROSEMIDE 10 MG/ML
40 INJECTION INTRAMUSCULAR; INTRAVENOUS 2 TIMES DAILY
Status: DISCONTINUED | OUTPATIENT
Start: 2021-02-23 | End: 2021-02-25

## 2021-02-23 RX ORDER — AMLODIPINE BESYLATE 5 MG/1
10 TABLET ORAL DAILY
Status: DISCONTINUED | OUTPATIENT
Start: 2021-02-23 | End: 2021-02-26 | Stop reason: HOSPADM

## 2021-02-23 RX ORDER — PROPRANOLOL HCL 60 MG
60 CAPSULE, EXTENDED RELEASE 24HR ORAL DAILY
Status: DISCONTINUED | OUTPATIENT
Start: 2021-02-23 | End: 2021-02-24

## 2021-02-23 RX ORDER — ASPIRIN 81 MG/1
81 TABLET ORAL DAILY
Status: DISCONTINUED | OUTPATIENT
Start: 2021-02-23 | End: 2021-02-26 | Stop reason: HOSPADM

## 2021-02-23 RX ORDER — ONDANSETRON 2 MG/ML
4 INJECTION INTRAMUSCULAR; INTRAVENOUS EVERY 6 HOURS PRN
Status: DISCONTINUED | OUTPATIENT
Start: 2021-02-23 | End: 2021-02-26 | Stop reason: HOSPADM

## 2021-02-23 RX ORDER — SPIRONOLACTONE 25 MG/1
50 TABLET ORAL DAILY
Status: CANCELLED | OUTPATIENT
Start: 2021-02-23

## 2021-02-23 RX ORDER — ACETAMINOPHEN 650 MG/1
650 SUPPOSITORY RECTAL EVERY 6 HOURS PRN
Status: DISCONTINUED | OUTPATIENT
Start: 2021-02-23 | End: 2021-02-26 | Stop reason: HOSPADM

## 2021-02-23 RX ORDER — SODIUM CHLORIDE 0.9 % (FLUSH) 0.9 %
10 SYRINGE (ML) INJECTION PRN
Status: DISCONTINUED | OUTPATIENT
Start: 2021-02-23 | End: 2021-02-26 | Stop reason: HOSPADM

## 2021-02-23 RX ORDER — POTASSIUM CHLORIDE 20 MEQ/1
20 TABLET, EXTENDED RELEASE ORAL EVERY 4 HOURS
Status: COMPLETED | OUTPATIENT
Start: 2021-02-23 | End: 2021-02-23

## 2021-02-23 RX ORDER — LORAZEPAM 0.5 MG/1
0.5 TABLET ORAL 2 TIMES DAILY PRN
COMMUNITY
End: 2021-09-23 | Stop reason: ALTCHOICE

## 2021-02-23 RX ORDER — HEPARIN SODIUM 5000 [USP'U]/ML
5000 INJECTION, SOLUTION INTRAVENOUS; SUBCUTANEOUS EVERY 8 HOURS SCHEDULED
Status: DISCONTINUED | OUTPATIENT
Start: 2021-02-23 | End: 2021-02-26 | Stop reason: HOSPADM

## 2021-02-23 RX ORDER — PREGABALIN 75 MG/1
75 CAPSULE ORAL NIGHTLY
Status: DISCONTINUED | OUTPATIENT
Start: 2021-02-23 | End: 2021-02-26 | Stop reason: HOSPADM

## 2021-02-23 RX ORDER — INSULIN LISPRO 100 [IU]/ML
0-12 INJECTION, SOLUTION INTRAVENOUS; SUBCUTANEOUS
Status: CANCELLED | OUTPATIENT
Start: 2021-02-23

## 2021-02-23 RX ORDER — INSULIN LISPRO 100 [IU]/ML
0-3 INJECTION, SOLUTION INTRAVENOUS; SUBCUTANEOUS NIGHTLY
Status: DISCONTINUED | OUTPATIENT
Start: 2021-02-23 | End: 2021-02-24

## 2021-02-23 RX ORDER — INSULIN LISPRO 100 [IU]/ML
0-6 INJECTION, SOLUTION INTRAVENOUS; SUBCUTANEOUS
Status: DISCONTINUED | OUTPATIENT
Start: 2021-02-23 | End: 2021-02-24

## 2021-02-23 RX ORDER — LORAZEPAM 0.5 MG/1
0.5 TABLET ORAL ONCE
Status: DISCONTINUED | OUTPATIENT
Start: 2021-02-23 | End: 2021-02-23

## 2021-02-23 RX ORDER — DEXTROSE MONOHYDRATE 25 G/50ML
12.5 INJECTION, SOLUTION INTRAVENOUS PRN
Status: DISCONTINUED | OUTPATIENT
Start: 2021-02-23 | End: 2021-02-26 | Stop reason: HOSPADM

## 2021-02-23 RX ORDER — FUROSEMIDE 10 MG/ML
60 INJECTION INTRAMUSCULAR; INTRAVENOUS ONCE
Status: COMPLETED | OUTPATIENT
Start: 2021-02-23 | End: 2021-02-23

## 2021-02-23 RX ORDER — PROMETHAZINE HYDROCHLORIDE 25 MG/1
12.5 TABLET ORAL EVERY 6 HOURS PRN
Status: DISCONTINUED | OUTPATIENT
Start: 2021-02-23 | End: 2021-02-26 | Stop reason: HOSPADM

## 2021-02-23 RX ORDER — POLYETHYLENE GLYCOL 3350 17 G/17G
17 POWDER, FOR SOLUTION ORAL DAILY PRN
Status: DISCONTINUED | OUTPATIENT
Start: 2021-02-23 | End: 2021-02-26 | Stop reason: HOSPADM

## 2021-02-23 RX ORDER — LABETALOL HYDROCHLORIDE 5 MG/ML
10 INJECTION, SOLUTION INTRAVENOUS EVERY 4 HOURS PRN
Status: DISCONTINUED | OUTPATIENT
Start: 2021-02-23 | End: 2021-02-26 | Stop reason: HOSPADM

## 2021-02-23 RX ORDER — LORAZEPAM 2 MG/ML
1 INJECTION INTRAMUSCULAR ONCE
Status: COMPLETED | OUTPATIENT
Start: 2021-02-23 | End: 2021-02-23

## 2021-02-23 RX ORDER — SODIUM CHLORIDE 0.9 % (FLUSH) 0.9 %
10 SYRINGE (ML) INJECTION EVERY 12 HOURS SCHEDULED
Status: DISCONTINUED | OUTPATIENT
Start: 2021-02-23 | End: 2021-02-26 | Stop reason: HOSPADM

## 2021-02-23 RX ORDER — LORAZEPAM 0.5 MG/1
0.5 TABLET ORAL EVERY 12 HOURS PRN
Status: DISCONTINUED | OUTPATIENT
Start: 2021-02-23 | End: 2021-02-26 | Stop reason: HOSPADM

## 2021-02-23 RX ORDER — NICOTINE POLACRILEX 4 MG
15 LOZENGE BUCCAL PRN
Status: DISCONTINUED | OUTPATIENT
Start: 2021-02-23 | End: 2021-02-26 | Stop reason: HOSPADM

## 2021-02-23 RX ORDER — ATORVASTATIN CALCIUM 40 MG/1
40 TABLET, FILM COATED ORAL NIGHTLY
Status: DISCONTINUED | OUTPATIENT
Start: 2021-02-23 | End: 2021-02-26 | Stop reason: HOSPADM

## 2021-02-23 RX ORDER — DEXTROSE MONOHYDRATE 50 MG/ML
100 INJECTION, SOLUTION INTRAVENOUS PRN
Status: DISCONTINUED | OUTPATIENT
Start: 2021-02-23 | End: 2021-02-26 | Stop reason: HOSPADM

## 2021-02-23 RX ORDER — ACETAMINOPHEN 325 MG/1
650 TABLET ORAL EVERY 6 HOURS PRN
Status: DISCONTINUED | OUTPATIENT
Start: 2021-02-23 | End: 2021-02-26 | Stop reason: HOSPADM

## 2021-02-23 RX ORDER — METOLAZONE 2.5 MG/1
2.5 TABLET ORAL ONCE
Status: COMPLETED | OUTPATIENT
Start: 2021-02-23 | End: 2021-02-23

## 2021-02-23 RX ORDER — INSULIN LISPRO 100 [IU]/ML
0-6 INJECTION, SOLUTION INTRAVENOUS; SUBCUTANEOUS NIGHTLY
Status: CANCELLED | OUTPATIENT
Start: 2021-02-23

## 2021-02-23 RX ORDER — LISINOPRIL 10 MG/1
40 TABLET ORAL DAILY
Status: CANCELLED | OUTPATIENT
Start: 2021-02-23

## 2021-02-23 RX ADMIN — ATORVASTATIN CALCIUM 40 MG: 40 TABLET, FILM COATED ORAL at 21:31

## 2021-02-23 RX ADMIN — PROPRANOLOL HYDROCHLORIDE 60 MG: 60 CAPSULE, EXTENDED RELEASE ORAL at 08:23

## 2021-02-23 RX ADMIN — LORAZEPAM 1 MG: 2 INJECTION INTRAMUSCULAR; INTRAVENOUS at 10:06

## 2021-02-23 RX ADMIN — AZITHROMYCIN MONOHYDRATE 500 MG: 500 INJECTION, POWDER, LYOPHILIZED, FOR SOLUTION INTRAVENOUS at 03:46

## 2021-02-23 RX ADMIN — SERTRALINE 50 MG: 50 TABLET, FILM COATED ORAL at 08:23

## 2021-02-23 RX ADMIN — Medication 1000 MG: at 03:46

## 2021-02-23 RX ADMIN — FUROSEMIDE 60 MG: 10 INJECTION, SOLUTION INTRAVENOUS at 04:20

## 2021-02-23 RX ADMIN — Medication 10 ML: at 10:06

## 2021-02-23 RX ADMIN — Medication 10 ML: at 08:22

## 2021-02-23 RX ADMIN — HEPARIN SODIUM 5000 UNITS: 5000 INJECTION INTRAVENOUS; SUBCUTANEOUS at 21:31

## 2021-02-23 RX ADMIN — HEPARIN SODIUM 5000 UNITS: 5000 INJECTION INTRAVENOUS; SUBCUTANEOUS at 07:11

## 2021-02-23 RX ADMIN — POTASSIUM CHLORIDE 20 MEQ: 1500 TABLET, EXTENDED RELEASE ORAL at 08:26

## 2021-02-23 RX ADMIN — POTASSIUM CHLORIDE 20 MEQ: 1500 TABLET, EXTENDED RELEASE ORAL at 11:36

## 2021-02-23 RX ADMIN — POTASSIUM CHLORIDE 20 MEQ: 1500 TABLET, EXTENDED RELEASE ORAL at 04:20

## 2021-02-23 RX ADMIN — FUROSEMIDE 40 MG: 10 INJECTION, SOLUTION INTRAMUSCULAR; INTRAVENOUS at 18:14

## 2021-02-23 RX ADMIN — INSULIN LISPRO 1 UNITS: 100 INJECTION, SOLUTION INTRAVENOUS; SUBCUTANEOUS at 21:38

## 2021-02-23 RX ADMIN — METOLAZONE 2.5 MG: 2.5 TABLET ORAL at 04:20

## 2021-02-23 RX ADMIN — AMLODIPINE BESYLATE 10 MG: 5 TABLET ORAL at 08:23

## 2021-02-23 RX ADMIN — Medication 10 ML: at 21:45

## 2021-02-23 RX ADMIN — INSULIN LISPRO 1 UNITS: 100 INJECTION, SOLUTION INTRAVENOUS; SUBCUTANEOUS at 16:55

## 2021-02-23 RX ADMIN — ASPIRIN 81 MG: 81 TABLET, FILM COATED ORAL at 08:23

## 2021-02-23 RX ADMIN — HEPARIN SODIUM 5000 UNITS: 5000 INJECTION INTRAVENOUS; SUBCUTANEOUS at 13:49

## 2021-02-23 RX ADMIN — PREGABALIN 75 MG: 75 CAPSULE ORAL at 21:34

## 2021-02-23 ASSESSMENT — PAIN SCALES - GENERAL
PAINLEVEL_OUTOF10: 0

## 2021-02-23 ASSESSMENT — PAIN DESCRIPTION - PAIN TYPE: TYPE: ACUTE PAIN

## 2021-02-23 ASSESSMENT — PAIN DESCRIPTION - ONSET: ONSET: ON-GOING

## 2021-02-23 ASSESSMENT — PAIN DESCRIPTION - ORIENTATION: ORIENTATION: OTHER (COMMENT)

## 2021-02-23 ASSESSMENT — PAIN DESCRIPTION - FREQUENCY: FREQUENCY: INTERMITTENT

## 2021-02-23 ASSESSMENT — PAIN DESCRIPTION - DESCRIPTORS: DESCRIPTORS: SHARP;RADIATING

## 2021-02-23 NOTE — PROGRESS NOTES
Admitted from ED, arrived in room around [phone] in wheelchair, alert and oriented x4. Oriented to room, call light in reach, plan of care reviewed with pt, DONNA. VSS. Assessment completed, see flow charts. No distress noted. yessy

## 2021-02-23 NOTE — CONSULTS
[facility] [place] [facility]\"" 124                     350 [facility], [address]                                  CONSULTATION    PATIENT NAME: Mark Beckwith                     :        1975  MED REC NO:   [id number]                          ROOM:       3211  ACCOUNT NO:   [de-identified]                           ADMIT DATE: 2021  PROVIDER:     Ruth Stephen MD    RENAL CONSULTATION    CONSULT DATE:  2021    CONSULTING PHYSICIAN:  Ruth Stephen MD    REFERRING PROVIDER:  Gabriel Salas MD    REASON FOR CONSULTATION:  Acute kidney injury on CKD III, edema, history  of nephrotic range proteinuria. HISTORY OF PRESENT ILLNESS:  The patient is a 41-year-old female with  history of diabetes mellitus for over 20 years, diabetic retinopathy,  nephrotic range proteinuria probably from diabetes with 24-hour urine  protein at 12.5 gm, KINZA, generalized anasarca/edema on both Lasix and  spironolactone, hypertension, pleural effusion and pericardial effusion,  hypoalbuminemia, diabetes mellitus who presents to the hospital at this  time secondary to significant weight gain of 38 pounds in two days. Her  chest x-ray shows possible pneumonia. According to her, she has been  stressed out helping with her mother's  who  recently. She  had an episode like this in December, which improved spontaneously. Her  creatinine today is 2.3 from a baseline of around 1.3 to 1.6. Potassium  is slightly lower at 3.3. Her outpatient diuretic regimen included  Lasix 20 mg twice a day and spironolactone 50 mg daily. She is also on  lisinopril. No hypotensive episode. No vomiting or diarrhea. She  denies any increased salt intake. No regular NSAID use.     PAST MEDICAL HISTORY:  Includes KINZA, diabetes mellitus, nephrotic range  proteinuria probably from diabetes mellitus, diabetic retinopathy,  pleural effusion, pericardial effusion, congestive heart failure, Venous blood gas, pH 7.375, pCO2 34. DIAGNOSTIC DATA:  Chest x-ray shows consolidation in the right lung base  likely in the middle lobe associated with right pleural effusion. Renal  ultrasound pending. ASSESSMENT AND PLAN:  1. KINZA on CKD III, baseline creatinine 1.3 to 1.6. The patient  currently appears hypervolemic. Probably due to decreased renal  perfusion in the setting of pneumonia plus ACE inhibitor use plus  possible venous congestion. Agree with Lasix. Replace potassium. We  will plan to start spironolactone tomorrow. Continue ceftriaxone and  Zithromax as antibiotic therapy for pneumonia. 2.  Hypokalemia, replaced. 3.  History of nephrotic range proteinuria probably from diabetes  mellitus. Probably has hyperactive RAAS at this time, which may have  explained sudden weight gain. Add spironolactone tomorrow if creatinine  is better. 4.  Hypertension. Continue diuresis. No need for aggressive control at  this time. 5.  Weight gain plus lower extremity edema. Would give Lasix 40 mg IV  twice a day starting this afternoon. 6.  History of diabetes mellitus. Management as per Medicine. Thank you very much for this consult. We will follow with you.         Shagufta Taveras MD    D: 02/23/2021 10:28:05       T: 02/23/2021 10:33:41     [id number]  Job#: [id number]     Doc#: [id number]    CC:

## 2021-02-23 NOTE — PROGRESS NOTES
Notified MD \"She is complaining of flank pain and kidney pain. She said that Tylenol does not work for her. Is there anything else we can give her for her pain?  She also says she has never had kidney stones before\"

## 2021-02-23 NOTE — ED NOTES
Pt transported via wheelchair, pt walker with pt, pt with mask on. Pt on tele.  Pt belongings with pt/      Harish García RN  02/23/21 1543

## 2021-02-23 NOTE — CONSULTS
Renal Consult  Full Note Dictated [id number]  A/P  1. KINZA on CKD 3- baseline crea 1.3 to 1.6. KINZA at this time probably due to decreased renal perfusion+venous congestion. Check CK. Follow with diuresis. Hypervolemic. 2.  Hypokalemia- replace  3. H/O Nephrotic Range Proteinuria-presumed DM Nephropathy. Hold ACEI for now. Try to restart MRA tomorrow. 4.   CHF/Edema-Lasix 40mg IV BID  5. Pneumonia- Abx  6. DM-per Medicine  7. HTN- folow with current regimen. Thank you.

## 2021-02-23 NOTE — ED PROVIDER NOTES
Emergency Department Encounter    Patient: Glendy Thomas  MRN: [id number]  : 1975  Date of Evaluation: 3/2/2021  ED Provider:  Oscar Carbajal    Triage Chief Complaint:   Shortness of Breath (sob, kidneys hurts, gained 38 pounds in 2 days and MD told her to go to ER)    Ambler:  Glendy Thomas is a 39 y.o. female that presents ER for evaluation of orthopnea dyspnea no active chest pain marked increase in weight gain, history of nephrotic syndrome sees Dr. Mynor Cano, history of diabetes insulin-dependent since age 21, prior history of stroke. No headache no double vision no active chest pain, positive dyspnea positive orthopnea. Positive lower extremity edema. ROS - see HPI, below listed is current ROS at time of my eval:  General:  No fevers, no chills  Eyes:  No recent vison changes, no discharge  ENT:  No sore throat, no nasal congestion, no hearing changes  Cardiovascular:  No chest pain, no palpitations  Respiratory:  + shortness of breath, no cough, no wheezing, + orthopnea  Gastrointestinal:  No pain, no nausea, no vomiting, no diarrhea  Musculoskeletal:  No muscle pain, no joint pain  Skin:  No rash, no pruritis, no easy bruising  Neurologic:  No speech problems, no headache  Psychiatric:  No anxiety  Genitourinary:  No dysuria, no hematuria  Endocrine:  No unexpected weight gain, no unexpected weight loss  Extremities:  + edema, no pain    Past Medical History:   Diagnosis Date    Cerebral artery occlusion with cerebral infarction ([facility] [address]     CHF (congestive heart failure) (HCC)     Chronic kidney disease     Diabetes mellitus out of control ([facility] [address]     Diabetes mellitus, type II ([address]         Generalized headaches     Hypertension     Infertility     Insomnia     chronic vs lack of time spent to sleep    Migraine headache 2011    Mixed hyperlipidemia     Otitis media     h/o recurrent    Pelvic abscess in female 10/5/2013    Pneumonia     2004 approx.      Past Surgical History:   Procedure Laterality Date    CERVIX SURGERY      laser tx for dysplasia;1992    EYE SURGERY       Family History   Problem Relation Age of Onset    Diabetes Mother     Diabetes Father     High Blood Pressure Father     Cancer Father         colon    Diabetes Sister     Diabetes Paternal Grandmother      Social History     Socioeconomic History    Marital status:      Spouse name: Meera Soto Number of children: 0    Years of education: Not on file    Highest education level: Not on file   Occupational History    Occupation: works as    Social Needs    Financial resource strain: Not on file    Food insecurity     Worry: Not on file     Inability: Not on file   [employer] needs     Medical: Not on file     Non-medical: Not on file   Tobacco Use    Smoking status: Current Every Day Smoker     Packs/day: 1.00     Types: Cigarettes    Smokeless tobacco: Never Used   Substance and Sexual Activity    Alcohol use: No     Comment: Very Rare    Drug use: No    Sexual activity: Yes     Partners: Male   Lifestyle    Physical activity     Days per week: Not on file     Minutes per session: Not on file    Stress: Not on file   Relationships    Social connections     Talks on phone: Not on file     Gets together: Not on file     Attends Congregational service: Not on file     Active member of club or organization: Not on file     Attends meetings of clubs or organizations: Not on file     Relationship status: Not on file    Intimate partner violence     Fear of current or ex partner: Not on file     Emotionally abused: Not on file     Physically abused: Not on file     Forced sexual activity: Not on file   Other Topics Concern    Not on file   Social History Narrative    Not on file     No current facility-administered medications for this encounter.       Current Outpatient Medications   Medication Sig Dispense Refill    amLODIPine (NORVASC) 10 MG tablet Take 1 tablet by mouth daily 30 tablet 1    carvedilol (COREG) 6.25 MG tablet Take 1 tablet by mouth 2 times daily (with meals) 60 tablet 1    furosemide (LASIX) 40 MG tablet Take 1 tablet by mouth every evening 30 tablet 1    spironolactone (ALDACTONE) 25 MG tablet Take 1 tablet by mouth daily 30 tablet 1    furosemide (LASIX) 80 MG tablet Take 1 tablet by mouth daily 60 tablet 1    nicotine (NICODERM CQ) 21 MG/24HR Place 1 patch onto the skin daily 30 patch 2    insulin glargine (LANTUS;BASAGLAR) 100 UNIT/ML injection pen Inject 16 Units into the skin daily 5 pen 1    LORazepam (ATIVAN) 0.5 MG tablet Take 0.5 mg by mouth 2 times daily as needed for Anxiety.  aspirin 81 MG EC tablet Take 1 tablet by mouth daily 30 tablet 3    atorvastatin (LIPITOR) 40 MG tablet Take 1 tablet by mouth nightly 30 tablet 3    pregabalin (LYRICA) 75 MG capsule Take 1 capsule by mouth nightly for 7 days. 7 capsule 0    sertraline (ZOLOFT) 50 MG tablet Take 1 tablet by mouth daily 30 tablet 3    glucose monitoring kit (FREESTYLE) monitoring kit 1 kit by Does not apply route daily 1 kit 0    insulin lispro, 1 Unit Dial, 100 UNIT/ML SOPN Inject 0-6 Units into the skin 3 times daily (with meals) **Corrective Low Dose Algorithm**  Glucose: Dose:               No Insulin  140-199 1 Unit  200-249 2 Units  250-299 3 Units  300-349 4 Units  350-399 5 Units  Over 399 6 Units (Patient taking differently: Inject 6-12 Units into the skin 3 times daily (with meals) **Corrective Low Dose Algorithm**  Glucose: Dose:               No Insulin  140-199 1 Unit  200-249 2 Units  250-299 3 Units  300-349 4 Units  350-399 5 Units  Over 399 6 Units) 3 pen 0    glucose blood VI test strips (VICTORY AGM-4000 TEST) strip Test four times daily until controlled and then three times daily.   Code 250.02  ONE TOUCH ULTRA MONITOR 100 strip 12     Allergies   Allergen Reactions    Amoxicillin Itching and Hives     Tolerates cephalosporins  Patient tolerating cefazolin (ANCEF) as of October 11, 2018  Patient tolerating cefazolin (ANCEF) as of October 11, 2018  Tolerates cephalosporins  Patient tolerating cefazolin (ANCEF) as of October 11, 2018    Levofloxacin Anaphylaxis    Levofloxacin Anaphylaxis    Vancomycin Shortness Of Breath, Hives and Anaphylaxis    Tape [Adhesive Tape] Other (See Comments)     Paper tape turns skin bright red. Plastic tape okay. Nursing Notes Reviewed    Physical Exam:  Triage VS:    ED Triage Vitals [02/23/21 0037]   Enc Vitals Group      BP (!) 172/90      Pulse 83      Resp 20      Temp 97.9 °F (36.6 °C)      Temp Source Oral      SpO2 97 %      Weight 240 lb 14.4 oz (109.3 kg)      Height       Head Circumference       Peak Flow       Pain Score       Pain Loc       Pain Edu? Excl. in [address]? My pulse ox interpretation is - normal    General appearance:  No acute distress. Skin:  Warm. Dry. No rash. Neck:  Trachea midline, no JVD  Heart:  Regular rate and rhythm, normal S1 & S2.    Perfusion:  Intact  Respiratory:  rales noted bilateral bases. Respirations nonlabored. Abdominal:  soft, nontender, bowel sounds intact, nondistended   Back:  No CVA tenderness to palpation  Extremity:    1+ extremity pitting edema bilateral lower extremities   Neurological:  Alert and oriented X 3. I have reviewed and interpreted all of the currently available lab results from this visit (if applicable):  Results for orders placed or performed during the hospital encounter of 02/23/21   Culture, Blood 2    Specimen: Blood   Result Value Ref Range    Culture, Blood 2 No Growth after 4 days of incubation. Culture, Blood 1    Specimen: Blood   Result Value Ref Range    Blood Culture, Routine No Growth after 4 days of incubation.     COVID-19, Rapid    Specimen: Nasopharyngeal Swab   Result Value Ref Range    SARS-CoV-2, NAAT Not Detected Not Detected   Strep Pneumoniae Antigen    Specimen: Urine, clean catch Result Value Ref Range    STREP PNEUMONIAE ANTIGEN, URINE       Presumptive Negative  Presumptive negative suggests no current or recent  pneumococcal infection. Infection due to Strep pneumoniae  cannot be ruled out since the antigen present in the sample  may be below the detection limit of the test.  Normal Range:Presumptive Negative     Legionella antigen, urine    Specimen: Urine, clean catch   Result Value Ref Range    L. pneumophila Serogp 1 Ur Ag       Presumptive Negative  No Legionella pneumophila serogroup 1 antigens detected. A negative result does not exclude infection with  Legionella pneumophila serogroup 1 nor does it rule out  other microbial-caused respiratory infections or  disease caused by other serogroups of  Legionella pneumophila.   Normal Range: Presumptive Negative     CBC Auto Differential   Result Value Ref Range    WBC 15.5 (H) 4.0 - 11.0 K/uL    RBC 3.46 (L) 4.00 - 5.20 M/uL    Hemoglobin 9.7 (L) 12.0 - 16.0 g/dL    Hematocrit 30.4 (L) 36.0 - 48.0 %    MCV 87.9 80.0 - 100.0 fL    MCH 27.9 26.0 - 34.0 pg    MCHC 31.7 31.0 - 36.0 g/dL    RDW 16.3 (H) 12.4 - 15.4 %    Platelets 597 779 - 849 K/uL    MPV 9.0 5.0 - 10.5 fL    Neutrophils % 65.1 %    Lymphocytes % 18.7 %    Monocytes % 8.4 %    Eosinophils % 6.9 %    Basophils % 0.9 %    Neutrophils Absolute 10.1 (H) 1.7 - 7.7 K/uL    Lymphocytes Absolute 2.9 1.0 - 5.1 K/uL    Monocytes Absolute 1.3 0.0 - 1.3 K/uL    Eosinophils Absolute 1.1 (H) 0.0 - 0.6 K/uL    Basophils Absolute 0.1 0.0 - 0.2 K/uL   Basic Metabolic Panel w/ Reflex to MG   Result Value Ref Range    Sodium 142 136 - 145 mmol/L    Potassium reflex Magnesium 3.3 (L) 3.5 - 5.1 mmol/L    Chloride 111 (H) 99 - 110 mmol/L    CO2 21 21 - 32 mmol/L    Anion Gap 10 3 - 16    Glucose 49 (LL) 70 - 99 mg/dL    BUN 35 (H) 7 - 20 mg/dL    CREATININE 2.3 (H) 0.6 - 1.1 mg/dL    GFR Non-African American 23 (A) >60    GFR  28 (A) >60    Calcium 8.7 8.3 - 10.6 mg/dL Troponin   Result Value Ref Range    Troponin 0.09 (H) <0.01 ng/mL   Brain Natriuretic Peptide   Result Value Ref Range    Pro-BNP 1,304 (H) 0 - 124 pg/mL   Blood Gas, Venous   Result Value Ref Range    pH, Dayne 7.375 7.350 - 7.450    pCO2, Dayne 34.3 (L) 40.0 - 50.0 mmHg    pO2, Dayne 157.0 (H) 25.0 - 40.0 mmHg    HCO3, Venous 20.0 (L) 23.0 - 29.0 mmol/L    Base Excess, Dayne -4.6 (L) -3.0 - 3.0 mmol/L    O2 Sat, Dayne 100 Not Established %    Carboxyhemoglobin 8.0 (H) 0.0 - 1.5 %    MetHgb, Dayne 0.2 <1.5 %    TC02 (Calc), Dayne 47 Not Established mmol/L    O2 Content, Dayne 13 Not Established VOL %    O2 Therapy Unknown    Lactate, Sepsis   Result Value Ref Range    Lactic Acid, Sepsis 0.7 0.4 - 1.9 mmol/L   Hepatic Function Panel   Result Value Ref Range    Total Protein 6.7 6.4 - 8.2 g/dL    Albumin 2.8 (L) 3.4 - 5.0 g/dL    Alkaline Phosphatase 190 (H) 40 - 129 U/L    ALT 16 10 - 40 U/L    AST 22 15 - 37 U/L    Total Bilirubin <0.2 0.0 - 1.0 mg/dL    Bilirubin, Direct <0.2 0.0 - 0.3 mg/dL    Bilirubin, Indirect see below 0.0 - 1.0 mg/dL   Urinalysis, reflex to microscopic   Result Value Ref Range    Color, UA YELLOW Straw/Yellow    Clarity, UA Clear Clear    Glucose, Ur 100 (A) Negative mg/dL    Bilirubin Urine Negative Negative    Ketones, Urine Negative Negative mg/dL    Specific Gravity, UA 1.014 1.005 - 1.030    Blood, Urine LARGE (A) Negative    pH, UA 6.0 5.0 - 8.0    Protein, UA >300 Negative mg/dL    Urobilinogen, Urine 0.2 <2.0 E.U./dL    Nitrite, Urine Negative Negative    Leukocyte Esterase, Urine Negative Negative    Microscopic Examination YES     Urine Type Voided    Magnesium   Result Value Ref Range    Magnesium 1.90 1.80 - 2.40 mg/dL   Microscopic Urinalysis   Result Value Ref Range    Hyaline Casts, UA 5 0 - 8 /LPF    WBC, UA 3 0 - 5 /HPF    RBC, UA 8 (H) 0 - 4 /HPF    Epithelial Cells, UA 3 0 - 5 /HPF   Iron and TIBC   Result Value Ref Range    Iron 36 (L) 37 - 145 ug/dL    TIBC 298 260 - 445 ug/dL Iron Saturation 12 (L) 15 - 50 %   Vitamin B12 & folate   Result Value Ref Range    Vitamin B-12 401 211 - 911 pg/mL    Folate 6.06 4.78 - 24.20 ng/mL   Ferritin   Result Value Ref Range    Ferritin 52.2 15.0 - 150.0 ng/mL   Reticulocytes   Result Value Ref Range    Retic Ct Pct 2.23 (H) 0.50 - 2.18 %    Retic Ct Abs 0.076 0.024 - 0.100 M/uL    Immature Retic Fract 0.52 (H) 0.21 - 0.37    Hematocrit 30.6 (L) 36.0 - 48.0 %   Hemoglobin A1c   Result Value Ref Range    Hemoglobin A1C 8.9 See comment %    eAG 208.7 mg/dL   Troponin   Result Value Ref Range    Troponin 0.07 (H) <0.01 ng/mL   Troponin   Result Value Ref Range    Troponin 0.07 (H) <0.01 ng/mL   Troponin   Result Value Ref Range    Troponin 0.06 (H) <0.01 ng/mL   Procalcitonin   Result Value Ref Range    Procalcitonin 0.13 0.00 - 0.15 ng/mL   Comprehensive metabolic panel   Result Value Ref Range    Sodium 139 136 - 145 mmol/L    Potassium 3.7 3.5 - 5.1 mmol/L    Chloride 109 99 - 110 mmol/L    CO2 20 (L) 21 - 32 mmol/L    Anion Gap 10 3 - 16    Glucose 127 (H) 70 - 99 mg/dL    BUN 40 (H) 7 - 20 mg/dL    CREATININE 2.5 (H) 0.6 - 1.1 mg/dL    GFR Non-African American 21 (A) >60    GFR  25 (A) >60    Calcium 8.4 8.3 - 10.6 mg/dL    Total Protein 6.2 (L) 6.4 - 8.2 g/dL    Albumin 2.7 (L) 3.4 - 5.0 g/dL    Albumin/Globulin Ratio 0.8 (L) 1.1 - 2.2    Total Bilirubin <0.2 0.0 - 1.0 mg/dL    Alkaline Phosphatase 171 (H) 40 - 129 U/L    ALT 11 10 - 40 U/L    AST 20 15 - 37 U/L    Globulin 3.5 g/dL   Magnesium   Result Value Ref Range    Magnesium 1.90 1.80 - 2.40 mg/dL   Phosphorus   Result Value Ref Range    Phosphorus 5.6 (H) 2.5 - 4.9 mg/dL   CBC auto differential   Result Value Ref Range    WBC 15.2 (H) 4.0 - 11.0 K/uL    RBC 3.05 (L) 4.00 - 5.20 M/uL    Hemoglobin 8.4 (L) 12.0 - 16.0 g/dL    Hematocrit 26.9 (L) 36.0 - 48.0 %    MCV 88.4 80.0 - 100.0 fL    MCH 27.6 26.0 - 34.0 pg    MCHC 31.3 31.0 - 36.0 g/dL    RDW 16.5 (H) 12.4 - 15.4 % Platelets 566 612 - 131 K/uL    MPV 9.4 5.0 - 10.5 fL    Neutrophils % 61.7 %    Lymphocytes % 25.7 %    Monocytes % 6.1 %    Eosinophils % 5.3 %    Basophils % 1.2 %    Neutrophils Absolute 9.4 (H) 1.7 - 7.7 K/uL    Lymphocytes Absolute 3.9 1.0 - 5.1 K/uL    Monocytes Absolute 0.9 0.0 - 1.3 K/uL    Eosinophils Absolute 0.8 (H) 0.0 - 0.6 K/uL    Basophils Absolute 0.2 0.0 - 0.2 K/uL   Lipid panel - fasting   Result Value Ref Range    Cholesterol, Total 164 0 - 199 mg/dL    Triglycerides 157 (H) 0 - 150 mg/dL    HDL 41 40 - 60 mg/dL    LDL Calculated 92 <100 mg/dL    VLDL Cholesterol Calculated 31 Not Established mg/dL   CK   Result Value Ref Range    Total  (H) 26 - 192 U/L   Hemoglobin A1c   Result Value Ref Range    Hemoglobin A1C 8.9 See comment %    eAG 208.7 mg/dL   Hemoglobin and hematocrit, blood   Result Value Ref Range    Hemoglobin 9.1 (L) 12.0 - 16.0 g/dL    Hematocrit 28.1 (L) 36.0 - 48.0 %   Comprehensive metabolic panel   Result Value Ref Range    Sodium 135 (L) 136 - 145 mmol/L    Potassium 4.0 3.5 - 5.1 mmol/L    Chloride 105 99 - 110 mmol/L    CO2 21 21 - 32 mmol/L    Anion Gap 9 3 - 16    Glucose 173 (H) 70 - 99 mg/dL    BUN 43 (H) 7 - 20 mg/dL    CREATININE 2.5 (H) 0.6 - 1.1 mg/dL    GFR Non-African American 21 (A) >60    GFR  25 (A) >60    Calcium 8.3 8.3 - 10.6 mg/dL    Total Protein 6.0 (L) 6.4 - 8.2 g/dL    Albumin 2.6 (L) 3.4 - 5.0 g/dL    Albumin/Globulin Ratio 0.8 (L) 1.1 - 2.2    Total Bilirubin <0.2 0.0 - 1.0 mg/dL    Alkaline Phosphatase 169 (H) 40 - 129 U/L    ALT 13 10 - 40 U/L    AST 18 15 - 37 U/L    Globulin 3.4 g/dL   Magnesium   Result Value Ref Range    Magnesium 1.90 1.80 - 2.40 mg/dL   Phosphorus   Result Value Ref Range    Phosphorus 5.3 (H) 2.5 - 4.9 mg/dL   Brain Natriuretic Peptide   Result Value Ref Range    Pro-BNP 1,103 (H) 0 - 124 pg/mL   CBC   Result Value Ref Range    WBC 12.2 (H) 4.0 - 11.0 K/uL    RBC 2.95 (L) 4.00 - 5.20 M/uL    Hemoglobin 8.2 (L) 12.0 - 16.0 g/dL    Hematocrit 25.7 (L) 36.0 - 48.0 %    MCV 87.2 80.0 - 100.0 fL    MCH 27.9 26.0 - 34.0 pg    MCHC 32.0 31.0 - 36.0 g/dL    RDW 16.1 (H) 12.4 - 15.4 %    Platelets 701 130 - 294 K/uL    MPV 9.3 5.0 - 10.5 fL   Comprehensive metabolic panel   Result Value Ref Range    Sodium 138 136 - 145 mmol/L    Potassium 4.1 3.5 - 5.1 mmol/L    Chloride 105 99 - 110 mmol/L    CO2 18 (L) 21 - 32 mmol/L    Anion Gap 15 3 - 16    Glucose 153 (H) 70 - 99 mg/dL    BUN 49 (H) 7 - 20 mg/dL    CREATININE 2.5 (H) 0.6 - 1.1 mg/dL    GFR Non-African American 21 (A) >60    GFR  25 (A) >60    Calcium 8.4 8.3 - 10.6 mg/dL    Total Protein 6.2 (L) 6.4 - 8.2 g/dL    Albumin 2.6 (L) 3.4 - 5.0 g/dL    Albumin/Globulin Ratio 0.7 (L) 1.1 - 2.2    Total Bilirubin <0.2 0.0 - 1.0 mg/dL    Alkaline Phosphatase 168 (H) 40 - 129 U/L    ALT 12 10 - 40 U/L    AST 17 15 - 37 U/L    Globulin 3.6 g/dL   Magnesium   Result Value Ref Range    Magnesium 1.90 1.80 - 2.40 mg/dL   Phosphorus   Result Value Ref Range    Phosphorus 6.0 (H) 2.5 - 4.9 mg/dL   CBC   Result Value Ref Range    WBC 13.5 (H) 4.0 - 11.0 K/uL    RBC 2.95 (L) 4.00 - 5.20 M/uL    Hemoglobin 8.2 (L) 12.0 - 16.0 g/dL    Hematocrit 25.8 (L) 36.0 - 48.0 %    MCV 87.4 80.0 - 100.0 fL    MCH 27.8 26.0 - 34.0 pg    MCHC 31.8 31.0 - 36.0 g/dL    RDW 16.1 (H) 12.4 - 15.4 %    Platelets 186 198 - 792 K/uL    MPV 9.3 5.0 - 10.5 fL   POCT Glucose   Result Value Ref Range    POC Glucose 54 (L) 70 - 99 mg/dl    Performed on ACCU-CHEK    POCT Glucose   Result Value Ref Range    POC Glucose 101 (H) 70 - 99 mg/dl    Performed on ACCU-CHEK    POCT Glucose   Result Value Ref Range    POC Glucose 55 (L) 70 - 99 mg/dl    Performed on ACCU-CHEK    POCT Glucose   Result Value Ref Range    POC Glucose 61 (L) 70 - 99 mg/dl    Performed on ACCU-CHEK    POCT Glucose   Result Value Ref Range    POC Glucose 65 (L) 70 - 99 mg/dl    Performed on ACCU-CHEK    POCT Glucose   Result Value Ref Range    POC Glucose 106 (H) 70 - 99 mg/dl    Performed on ACCU-CHEK    POCT Glucose   Result Value Ref Range    POC Glucose 113 (H) 70 - 99 mg/dl    Performed on ACCU-CHEK    POCT Glucose   Result Value Ref Range    POC Glucose 159 (H) 70 - 99 mg/dl    Performed on ACCU-CHEK    POCT Glucose   Result Value Ref Range    POC Glucose 145 (H) 70 - 99 mg/dl    Performed on ACCU-CHEK    POCT Glucose   Result Value Ref Range    POC Glucose 131 (H) 70 - 99 mg/dl    Performed on ACCU-CHEK    POCT Glucose   Result Value Ref Range    POC Glucose 129 (H) 70 - 99 mg/dl    Performed on ACCU-CHEK    POCT Glucose   Result Value Ref Range    POC Glucose 199 (H) 70 - 99 mg/dl    Performed on ACCU-CHEK    POCT Glucose   Result Value Ref Range    POC Glucose 240 (H) 70 - 99 mg/dl    Performed on ACCU-CHEK    POCT Glucose   Result Value Ref Range    POC Glucose 217 (H) 70 - 99 mg/dl    Performed on ACCU-CHEK    POCT Glucose   Result Value Ref Range    POC Glucose 191 (H) 70 - 99 mg/dl    Performed on ACCU-CHEK    POCT Glucose   Result Value Ref Range    POC Glucose 203 (H) 70 - 99 mg/dl    Performed on ACCU-CHEK    POCT Glucose   Result Value Ref Range    POC Glucose 180 (H) 70 - 99 mg/dl    Performed on ACCU-CHEK    POCT Glucose   Result Value Ref Range    POC Glucose 195 (H) 70 - 99 mg/dl    Performed on ACCU-CHEK    POCT Glucose   Result Value Ref Range    POC Glucose 138 (H) 70 - 99 mg/dl    Performed on ACCU-CHEK    POCT Glucose   Result Value Ref Range    POC Glucose 133 (H) 70 - 99 mg/dl    Performed on ACCU-CHEK    EKG 12 Lead   Result Value Ref Range    Ventricular Rate 83 BPM    Atrial Rate 83 BPM    P-R Interval 136 ms    QRS Duration 84 ms    Q-T Interval 384 ms    QTc Calculation (Bazett) 451 ms    P Axis 31 degrees    R Axis 46 degrees    T Axis 126 degrees    Diagnosis       Normal sinus rhythmNonspecific T wave abnormalityAbnormal ECGConfirmed by Beckie Cervantes MD, Fletcher Kingston ([id number]) on 2/23/2021 8:32:04 AM      Radiographs (if obtained):  Radiologist's Report Reviewed:  Xr Chest (2 Vw)    Result Date: 2/23/2021  EXAMINATION: TWO XRAY VIEWS OF THE CHEST 2/23/2021 1:58 am COMPARISON: 08/27/2020 HISTORY: ORDERING SYSTEM PROVIDED HISTORY: chf TECHNOLOGIST PROVIDED HISTORY: Reason for exam:->chf Reason for Exam: Shortness of Breath Acuity: Acute Type of Exam: Initial Relevant Medical/Surgical History: previous CHF,hypertension and pneumonia FINDINGS: The cardiac silhouette and mediastinal contours are stable. There is a new right pleural effusion with associated consolidation in the right lung base, concerning for pneumonia. Follow-up radiographs are recommended. Overall lung volumes are low. 1. New consolidation in the right lung base, likely in the middle lobe, with associated right pleural effusion. Findings are concerning for pneumonia. Follow-up radiographs are recommended. EKG (if obtained): (All EKG's are interpreted by myself in the absence of a cardiologist)    MDM:  Gerardo Nava to the ER for evaluation with acute on subacute pulmonary edema end-stage renal disease, with worsening decompensated congestive heart failure secondary to clean renal function, patient received diuresis, she will be admitted for further management of chronic kidney disease pulmonary edema, case discussed with the admitting physician and nephrology  Clinical Impression:  1. History of CVA (cerebrovascular accident)    2. Stage 3a chronic kidney disease    3. Dyspnea and respiratory abnormalities    4.  Acute pulmonary edema (HCC)    5. Stage 3 chronic kidney disease, unspecified whether stage 3a or 3b CKD      Disposition referral (if applicable):  NORA Chambers  [address] [address]  [phone]    Go on 3/3/2021  appointment time 215pm    Disposition medications (if applicable):  Discharge Medication List as of 2/26/2021  2:38 PM      START taking these medications    Details   carvedilol (COREG) 6.25 MG tablet Take 1 tablet by mouth 2 times daily (with meals), Disp-60 tablet, [id number]           ED Provider Disposition Time  DISPOSITION Admitted 02/23/2021 04:15:49 AM      Comment: Please note this report has been produced using speech recognition software and may contain errors related to that system including errors in grammar, punctuation, and spelling, as well as words and phrases that may be inappropriate. Efforts were made to edit the dictations.       Mode Redman MD  66/91/58 1048

## 2021-02-23 NOTE — H&P
Hospital Medicine History and Physical    2/23/2021    Date of Admission: 2/23/2021    Date of Service: Pt seen/examined on 2/23/2021 and admitted to inpatient. Assessment/plan:  1. Acute on chronic diastolic CHF. Patient received 60 mg of IV Lasix in the emergency room. Will continue IV Lasix 40 mg twice daily. Currently holding Aldactone and ACE inhibitor, especially with elevated creatinine levels. Monitor volume status closely. 2. Bacterial pneumonia, possible. Chest x-ray with right lung base and right middle lobe consolidation concerning for pneumonia. Received a dose of Zithromax and Rocephin in the emergency room. Continue antibiotics. Follow cultures, Strep and Legionella antigen studies. 3. Acute kidney injury on CKD stage III. Likely cardiorenal.  Patient with history of nephrotic syndrome. Current urinalysis with greater than 300 protein. Currently holding ACE inhibitor due to KINZA. Monitor renal function closely. Consult nephrology to assist with evaluation. 4. History of diabetes type 2, currently with hypoglycemia. Adjustments made to insulin dose. For now, follow hypoglycemic protocol for glucose correction and monitor Accu-Chek hourly x3. Decreased Lantus dose, continue low-dose sliding scale insulin. 5. Hypokalemia, potassium 3.3. Will give 20 mEq of p.o. potassium x3 doses. Monitor potassium and magnesium levels closely, especially with diuresis. 6. Uncontrolled hypertension. Could be secondary to volume overload. Continue diuresis as noted above. Continue home antihypertensive medications. Added IV labetalol for systolic blood pressure greater than 170 mmHg. 7. Nonzero troponin. Could be secondary to demand ischemia. Will obtain serial troponin. Continue aspirin and statin. 8. Normocytic anemia. No blood stools. Check iron studies, B12/folate, reticulocyte count. Monitor hemoglobin closely.   9. Other comorbidities: history of chronic diastolic CHF (most recent echocardiogram from August 2020 with ejection fraction of 55%), history of nephrotic syndrome, CKD stage III (with baseline creatinine of 1.3-1.6), history of CVA, uncontrolled diabetes type 2, essential hypertension, hyperlipidemia, obesity with BMI of 37 kg/m², chronic nonzero troponin elevation. Activities: Up with assist  Prophylaxis: Subcutaneous Lovenox  Code status: Full code    ==========================================================  Chief complaint:  Chief Complaint   Patient presents with    Shortness of Breath     sob, kidneys hurts, gained 38 pounds in 2 days and MD told her to go to ER       History of Presenting Illness: This is a pleasant 39 y.o. female with history of chronic diastolic CHF (most recent echocardiogram from August 2020 with ejection fraction of 55%), history of nephrotic syndrome, CKD stage III (with baseline creatinine of 1.3-1.6), history of CVA, uncontrolled diabetes type 2, essential hypertension, hyperlipidemia, obesity with BMI of 37 kg/m², chronic nonzero troponin elevation, who presents to the emergency room with complaints of shortness of breath, orthopnea, about 38 pound weight gain over the last 2 days. She also reports nonproductive cough that has been present for 2 days. She reports subjective fever yesterday. She does not have chills. Blood pressure was elevated in ER. Urinalysis in the emergency room with greater than 300 proteinuria. Potassium was 3.3, glucose 49, creatinine 2.3 (baseline 1.3-1.6), troponin 0.09, proBNP 1304, mild leukocytosis of 15,500, hemoglobin 9.4, MCV 87.9. Chest x-ray reveals new consolidation in the right lung base, likely in the middle lobe with associated right pleural effusion, concerning for pneumonia.     Past Medical History:      Diagnosis Date    Cerebral artery occlusion with [facility] infarction [facility] [address]     CHF (congestive heart failure) (HCC)     Chronic kidney disease     Diabetes mellitus out of control ([facility] [address]  Diabetes mellitus, type II ([facility] [address]     2005    Generalized headaches     Hypertension     Infertility     Insomnia     chronic vs lack of time spent to sleep    Migraine headache 11/9/2011    Mixed hyperlipidemia     Otitis media     h/o recurrent    Pelvic abscess in female 10/5/2013    Pneumonia     2004 approx. Past Surgical History:      Procedure Laterality Date    CERVIX SURGERY      laser tx for dysplasia;1992    EYE SURGERY         Medications (prior to admission):  Prior to Admission medications    Medication Sig Start Date End Date Taking? Authorizing Provider   LORazepam (ATIVAN) 0.5 MG tablet Take 0.5 mg by mouth 2 times daily as needed for Anxiety. Yes Historical Provider, MD   furosemide (LASIX) 40 MG tablet Take 0.5 tablets by mouth 2 times daily  Patient taking differently: Take 40 mg by mouth daily  11/12/20  Yes Maggie Reddy MD   aspirin 81 MG EC tablet Take 1 tablet by mouth daily 9/1/20  Yes Nichol Elias MD   amLODIPine (NORVASC) 5 MG tablet Take 1 tablet by mouth daily  Patient taking differently: Take 10 mg by mouth daily  8/31/20  Yes Nichol Elias MD   atorvastatin (LIPITOR) 40 MG tablet Take 1 tablet by mouth nightly 8/31/20  Yes Nichol Elias MD   pregabalin (LYRICA) 75 MG capsule Take 1 capsule by mouth nightly for 7 days.  8/31/20 2/23/21 Yes Nichol Elias MD   insulin glargine (LANTUS;BASAGLAR) 100 UNIT/ML injection pen Inject 34 Units into the skin nightly  8/31/20  Yes Nichol Elias MD   sertraline (ZOLOFT) 50 MG tablet Take 1 tablet by mouth daily 9/1/20  Yes Nichol Elias MD   glucose monitoring kit (FREESTYLE) monitoring kit 1 kit by Does not apply route daily 5/13/20  Yes Maggie Reddy MD   insulin lispro, 1 Unit Dial, 100 UNIT/ML SOPN Inject 0-6 Units into the skin 3 times daily (with meals) **Corrective Low Dose Algorithm**  Glucose: Dose:               No Insulin  140-199 1 Unit  200-249 2 Units  250-299 3 Units  300-349 4 Units  350-399 5 Units  Over 399 6 Units  Patient taking differently: Inject 6-12 Units into the skin 3 times daily (with meals) **Corrective Low Dose Algorithm**  Glucose: Dose:               No Insulin  140-199 1 Unit  200-249 2 Units  250-299 3 Units  300-349 4 Units  350-399 5 Units  Over 399 6 Units 4/29/20  Yes Maikol Gonzalez MD   lisinopril (PRINIVIL;ZESTRIL) 40 MG tablet Take 1 tablet by mouth daily 4/29/20  Yes Maikol Gonzalez MD   spironolactone (ALDACTONE) 50 MG tablet Take 1 tablet by mouth daily 4/29/20  Yes Maikol Gonzalez MD   propranolol (INDERAL LA) 60 MG extended release capsule Take 60 mg by mouth daily 4/22/20  Yes Niesha Steve MD   Insulin Syringe-Needle U-100 30G X 5/16\" 1 ML MISC 1 each by Does not apply route daily 4/25/19  Yes Hayde Felix MD   glucose blood VI test strips (VICTORY AGM-4000 TEST) strip Test four times daily until controlled and then three times daily. Code 250.02  ONE TOUCH ULTRA MONITOR 10/15/13 2/23/21 Yes David Gaming MD   Insulin Pen Needle (B-D UF III MINI PEN NEEDLES) 31G X 5 MM MISC by Does not apply route. 11/9/11  Yes David Gaming MD   insulin glargine (LANTUS SOLOSTAR) 100 UNIT/ML injection pen Inject 32 Units into the skin nightly 12/4/20 1/3/21  Leena Kay MD       Allergy(ies):  Amoxicillin, Levofloxacin, Levofloxacin, Vancomycin, and Tape Grayce Crisp tape]    Social History:  TOBACCO:  reports that she has been smoking cigarettes. She has been smoking about 1.00 pack per day. She has never used smokeless tobacco.  ETOH:  reports no history of alcohol use. Family History:      Problem Relation Age of Onset    Diabetes Mother     Diabetes Father     High Blood Pressure Father     Cancer Father         colon    Diabetes Sister     Diabetes Paternal Grandmother        Review of Systems:  Pertinent positives are listed in HPI. At least 10-point ROS reviewed and were negative.      Vitals and physical examination:  BP (!) 172/90   Pulse 83   Temp 97.9 °F (36.6 °C) (Oral)   Resp 20   Wt 240 lb 14.4 oz (109.3 kg)   LMP 02/23/2021   SpO2 97%   BMI 37.73 kg/m²   Gen/overall appearance: Not in acute distress. Alert. Oriented X3. Head: Normocephalic, atraumatic  Eyes: EOMI, good acuity  ENT: Oral mucosa moist  Neck: No JVD, thyromegaly  CVS: Nml S1S2, no MRG, RRR  Pulm: Clear bilaterally. No crackles/wheezes  Gastrointestinal: Soft, NT/ND, +BS  Musculoskeletal: Diffuse anasarca. Warm  Neuro: No focal deficit. Moves extremity spontaneously. Psychiatry: Appropriate affect. Not agitated. Skin: Warm, dry with normal turgor. No rash  Capillary refill: Brisk,< 3 seconds   Peripheral Pulses: +2 palpable, equal bilaterally       Labs/imaging/EKG:  CBC:   Recent Labs     02/23/21  0248   WBC 15.5*   HGB 9.7*        BMP:    Recent Labs     02/23/21  0248      K 3.3*   *   CO2 21   BUN 35*   CREATININE 2.3*   GLUCOSE 49*     Hepatic:   Recent Labs     02/23/21  0248   AST 22   ALT 16   BILITOT <0.2   ALKPHOS 190*       Xr Chest (2 Vw)    Result Date: 2/23/2021  EXAMINATION: TWO XRAY VIEWS OF THE CHEST 2/23/2021 1:58 am COMPARISON: 08/27/2020 HISTORY: ORDERING SYSTEM PROVIDED HISTORY: chf TECHNOLOGIST PROVIDED HISTORY: Reason for exam:->chf Reason for Exam: Shortness of Breath Acuity: Acute Type of Exam: Initial Relevant Medical/Surgical History: previous CHF,hypertension and pneumonia FINDINGS: The cardiac silhouette and mediastinal contours are stable. There is a new right pleural effusion with associated consolidation in the right lung base, concerning for pneumonia. Follow-up radiographs are recommended. Overall lung volumes are low. 1. New consolidation in the right lung base, likely in the middle lobe, with associated right pleural effusion. Findings are concerning for pneumonia. Follow-up radiographs are recommended. EKG: Normal sinus rhythm, rate 83 beats per minutes. No acute ST/T changes.   I reviewed EKG.    Discussed with ER provider.       Thank you NORA Dodge for the opportunity to be involved in this patient's care.    -----------------------------  Cory Portillo MD  [facility]

## 2021-02-23 NOTE — FLOWSHEET NOTE
Notified MD Giselle Rahman is back and she has large amount of blood in urine with RBC UA of 8. Negative for leukocytes.  Troponin 0.07, trending down\"

## 2021-02-23 NOTE — ED NOTES
Pt with runny stool, pt able to ambulate to the bedside commode, pt given new gown.       Marcellus Perez RN  02/23/21 [id number]

## 2021-02-24 ENCOUNTER — APPOINTMENT (OUTPATIENT)
Dept: GENERAL RADIOLOGY | Age: 46
DRG: 194 | End: 2021-02-24
Payer: COMMERCIAL

## 2021-02-24 PROBLEM — E11.42 DIABETIC PERIPHERAL NEUROPATHY (HCC): Status: ACTIVE | Noted: 2021-02-24

## 2021-02-24 LAB
A/G RATIO: 0.8 (ref 1.1–2.2)
ALBUMIN SERPL-MCNC: 2.7 G/DL (ref 3.4–5)
ALP BLD-CCNC: 171 U/L (ref 40–129)
ALT SERPL-CCNC: 11 U/L (ref 10–40)
ANION GAP SERPL CALCULATED.3IONS-SCNC: 10 MMOL/L (ref 3–16)
AST SERPL-CCNC: 20 U/L (ref 15–37)
BASOPHILS ABSOLUTE: 0.2 K/UL (ref 0–0.2)
BASOPHILS RELATIVE PERCENT: 1.2 %
BILIRUB SERPL-MCNC: <0.2 MG/DL (ref 0–1)
BUN BLDV-MCNC: 40 MG/DL (ref 7–20)
CALCIUM SERPL-MCNC: 8.4 MG/DL (ref 8.3–10.6)
CHLORIDE BLD-SCNC: 109 MMOL/L (ref 99–110)
CHOLESTEROL, TOTAL: 164 MG/DL (ref 0–199)
CO2: 20 MMOL/L (ref 21–32)
CREAT SERPL-MCNC: 2.5 MG/DL (ref 0.6–1.1)
EOSINOPHILS ABSOLUTE: 0.8 K/UL (ref 0–0.6)
EOSINOPHILS RELATIVE PERCENT: 5.3 %
ESTIMATED AVERAGE GLUCOSE: 208.7 MG/DL
GFR AFRICAN AMERICAN: 25
GFR NON-AFRICAN AMERICAN: 21
GLOBULIN: 3.5 G/DL
GLUCOSE BLD-MCNC: 127 MG/DL (ref 70–99)
GLUCOSE BLD-MCNC: 129 MG/DL (ref 70–99)
GLUCOSE BLD-MCNC: 131 MG/DL (ref 70–99)
GLUCOSE BLD-MCNC: 199 MG/DL (ref 70–99)
GLUCOSE BLD-MCNC: 217 MG/DL (ref 70–99)
GLUCOSE BLD-MCNC: 240 MG/DL (ref 70–99)
HBA1C MFR BLD: 8.9 %
HCT VFR BLD CALC: 26.9 % (ref 36–48)
HCT VFR BLD CALC: 28.1 % (ref 36–48)
HDLC SERPL-MCNC: 41 MG/DL (ref 40–60)
HEMOGLOBIN: 8.4 G/DL (ref 12–16)
HEMOGLOBIN: 9.1 G/DL (ref 12–16)
LDL CHOLESTEROL CALCULATED: 92 MG/DL
LYMPHOCYTES ABSOLUTE: 3.9 K/UL (ref 1–5.1)
LYMPHOCYTES RELATIVE PERCENT: 25.7 %
MAGNESIUM: 1.9 MG/DL (ref 1.8–2.4)
MCH RBC QN AUTO: 27.6 PG (ref 26–34)
MCHC RBC AUTO-ENTMCNC: 31.3 G/DL (ref 31–36)
MCV RBC AUTO: 88.4 FL (ref 80–100)
MONOCYTES ABSOLUTE: 0.9 K/UL (ref 0–1.3)
MONOCYTES RELATIVE PERCENT: 6.1 %
NEUTROPHILS ABSOLUTE: 9.4 K/UL (ref 1.7–7.7)
NEUTROPHILS RELATIVE PERCENT: 61.7 %
PDW BLD-RTO: 16.5 % (ref 12.4–15.4)
PERFORMED ON: ABNORMAL
PHOSPHORUS: 5.6 MG/DL (ref 2.5–4.9)
PLATELET # BLD: 341 K/UL (ref 135–450)
PMV BLD AUTO: 9.4 FL (ref 5–10.5)
POTASSIUM SERPL-SCNC: 3.7 MMOL/L (ref 3.5–5.1)
RBC # BLD: 3.05 M/UL (ref 4–5.2)
SODIUM BLD-SCNC: 139 MMOL/L (ref 136–145)
TOTAL CK: 216 U/L (ref 26–192)
TOTAL PROTEIN: 6.2 G/DL (ref 6.4–8.2)
TRIGL SERPL-MCNC: 157 MG/DL (ref 0–150)
VLDLC SERPL CALC-MCNC: 31 MG/DL
WBC # BLD: 15.2 K/UL (ref 4–11)

## 2021-02-24 PROCEDURE — 85025 COMPLETE CBC W/AUTO DIFF WBC: CPT

## 2021-02-24 PROCEDURE — 83735 ASSAY OF MAGNESIUM: CPT

## 2021-02-24 PROCEDURE — 6370000000 HC RX 637 (ALT 250 FOR IP): Performed by: FAMILY MEDICINE

## 2021-02-24 PROCEDURE — 2580000003 HC RX 258: Performed by: INTERNAL MEDICINE

## 2021-02-24 PROCEDURE — 6360000002 HC RX W HCPCS: Performed by: INTERNAL MEDICINE

## 2021-02-24 PROCEDURE — 6360000002 HC RX W HCPCS: Performed by: EMERGENCY MEDICINE

## 2021-02-24 PROCEDURE — 80061 LIPID PANEL: CPT

## 2021-02-24 PROCEDURE — 6370000000 HC RX 637 (ALT 250 FOR IP): Performed by: INTERNAL MEDICINE

## 2021-02-24 PROCEDURE — 84100 ASSAY OF PHOSPHORUS: CPT

## 2021-02-24 PROCEDURE — 36415 COLL VENOUS BLD VENIPUNCTURE: CPT

## 2021-02-24 PROCEDURE — 2060000000 HC ICU INTERMEDIATE R&B

## 2021-02-24 PROCEDURE — 82550 ASSAY OF CK (CPK): CPT

## 2021-02-24 PROCEDURE — 71045 X-RAY EXAM CHEST 1 VIEW: CPT

## 2021-02-24 PROCEDURE — 2580000003 HC RX 258: Performed by: EMERGENCY MEDICINE

## 2021-02-24 PROCEDURE — 83036 HEMOGLOBIN GLYCOSYLATED A1C: CPT

## 2021-02-24 PROCEDURE — 80053 COMPREHEN METABOLIC PANEL: CPT

## 2021-02-24 PROCEDURE — 85018 HEMOGLOBIN: CPT

## 2021-02-24 PROCEDURE — 85014 HEMATOCRIT: CPT

## 2021-02-24 RX ORDER — INSULIN LISPRO 100 [IU]/ML
0-12 INJECTION, SOLUTION INTRAVENOUS; SUBCUTANEOUS
Status: DISCONTINUED | OUTPATIENT
Start: 2021-02-24 | End: 2021-02-26 | Stop reason: HOSPADM

## 2021-02-24 RX ORDER — NICOTINE 21 MG/24HR
1 PATCH, TRANSDERMAL 24 HOURS TRANSDERMAL DAILY
Status: DISCONTINUED | OUTPATIENT
Start: 2021-02-24 | End: 2021-02-26 | Stop reason: HOSPADM

## 2021-02-24 RX ORDER — INSULIN LISPRO 100 [IU]/ML
0-6 INJECTION, SOLUTION INTRAVENOUS; SUBCUTANEOUS NIGHTLY
Status: DISCONTINUED | OUTPATIENT
Start: 2021-02-24 | End: 2021-02-26 | Stop reason: HOSPADM

## 2021-02-24 RX ORDER — CARVEDILOL 6.25 MG/1
6.25 TABLET ORAL 2 TIMES DAILY WITH MEALS
Status: DISCONTINUED | OUTPATIENT
Start: 2021-02-24 | End: 2021-02-26 | Stop reason: HOSPADM

## 2021-02-24 RX ADMIN — SERTRALINE 50 MG: 50 TABLET, FILM COATED ORAL at 08:19

## 2021-02-24 RX ADMIN — PREGABALIN 75 MG: 75 CAPSULE ORAL at 21:35

## 2021-02-24 RX ADMIN — AMLODIPINE BESYLATE 10 MG: 5 TABLET ORAL at 08:19

## 2021-02-24 RX ADMIN — INSULIN LISPRO 2 UNITS: 100 INJECTION, SOLUTION INTRAVENOUS; SUBCUTANEOUS at 21:36

## 2021-02-24 RX ADMIN — Medication 1000 MG: at 04:05

## 2021-02-24 RX ADMIN — FUROSEMIDE 40 MG: 10 INJECTION, SOLUTION INTRAMUSCULAR; INTRAVENOUS at 08:19

## 2021-02-24 RX ADMIN — Medication 10 ML: at 08:23

## 2021-02-24 RX ADMIN — Medication 10 ML: at 21:36

## 2021-02-24 RX ADMIN — HEPARIN SODIUM 5000 UNITS: 5000 INJECTION INTRAVENOUS; SUBCUTANEOUS at 16:31

## 2021-02-24 RX ADMIN — OXYCODONE 5 MG: 5 TABLET ORAL at 21:44

## 2021-02-24 RX ADMIN — ATORVASTATIN CALCIUM 40 MG: 40 TABLET, FILM COATED ORAL at 21:35

## 2021-02-24 RX ADMIN — HEPARIN SODIUM 5000 UNITS: 5000 INJECTION INTRAVENOUS; SUBCUTANEOUS at 06:30

## 2021-02-24 RX ADMIN — ASPIRIN 81 MG: 81 TABLET, FILM COATED ORAL at 08:19

## 2021-02-24 RX ADMIN — PROPRANOLOL HYDROCHLORIDE 60 MG: 60 CAPSULE, EXTENDED RELEASE ORAL at 08:18

## 2021-02-24 RX ADMIN — INSULIN LISPRO 4 UNITS: 100 INJECTION, SOLUTION INTRAVENOUS; SUBCUTANEOUS at 18:39

## 2021-02-24 RX ADMIN — LORAZEPAM 0.5 MG: 0.5 TABLET ORAL at 13:33

## 2021-02-24 RX ADMIN — AZITHROMYCIN MONOHYDRATE 500 MG: 500 INJECTION, POWDER, LYOPHILIZED, FOR SOLUTION INTRAVENOUS at 04:06

## 2021-02-24 RX ADMIN — Medication 10 ML: at 04:06

## 2021-02-24 RX ADMIN — HEPARIN SODIUM 5000 UNITS: 5000 INJECTION INTRAVENOUS; SUBCUTANEOUS at 21:36

## 2021-02-24 RX ADMIN — CARVEDILOL 6.25 MG: 6.25 TABLET, FILM COATED ORAL at 18:38

## 2021-02-24 RX ADMIN — FUROSEMIDE 40 MG: 10 INJECTION, SOLUTION INTRAMUSCULAR; INTRAVENOUS at 18:38

## 2021-02-24 ASSESSMENT — ENCOUNTER SYMPTOMS
FACIAL SWELLING: 0
COUGH: 0
ABDOMINAL PAIN: 0
CHEST TIGHTNESS: 0
NAUSEA: 0
DIARRHEA: 0
CONSTIPATION: 0
VOMITING: 0
SHORTNESS OF BREATH: 0
PHOTOPHOBIA: 0
EYE DISCHARGE: 0
ABDOMINAL DISTENTION: 0
EYE REDNESS: 0
BLOOD IN STOOL: 0

## 2021-02-24 ASSESSMENT — PAIN DESCRIPTION - DESCRIPTORS
DESCRIPTORS: BURNING
DESCRIPTORS: BURNING

## 2021-02-24 ASSESSMENT — PAIN DESCRIPTION - LOCATION
LOCATION: FOOT
LOCATION: FOOT

## 2021-02-24 ASSESSMENT — PAIN SCALES - GENERAL
PAINLEVEL_OUTOF10: 3
PAINLEVEL_OUTOF10: 0
PAINLEVEL_OUTOF10: 2

## 2021-02-24 ASSESSMENT — PAIN DESCRIPTION - ORIENTATION
ORIENTATION: RIGHT;LEFT
ORIENTATION: RIGHT;LEFT

## 2021-02-24 ASSESSMENT — PAIN DESCRIPTION - PAIN TYPE: TYPE: NEUROPATHIC PAIN

## 2021-02-24 NOTE — PROGRESS NOTES
Hospital Medicine Progress Note     Date:  2/24/2021    PCP: NORA Dodge (Tel: [phone])    Date of Admission: 2/23/2021      Subjective  Patient lying comfortably, states she feels much better after diuresis. Objective  Physical exam:  Vitals: BP (!) 152/89   Pulse 91   Temp 98.6 °F (37 °C) (Oral)   Resp 18   Ht 5' 7\" (1.702 m)   Wt 238 lb 12.8 oz (108.3 kg)   LMP 02/23/2021   SpO2 92%   BMI 37.40 kg/m²   Gen: Not in distress. Alert. Head: Normocephalic. Atraumatic. Eyes: EOMI. Good acuity. ENT: Oral mucosa moist  Neck: No JVD. No obvious thyromegaly. CVS: Nml S1S2, no MRG, RRR  Pulmomary: Mild crackles bases, no wheezing  Gastrointestinal: Soft, NT/ND. Positive bowel sounds. Musculoskeletal: Bilateral lower extremity edema  Neuro: No focal deficit. Moves extremity spontaneously. Psychiatry: Appropriate affect. Not agitated. Skin: Warm, dry with normal turgor. No rash      24HR INTAKE/OUTPUT:      Intake/Output Summary (Last 24 hours) at 2/24/2021 1222  Last data filed at 2/24/2021 0759  Gross per 24 hour   Intake 1210 ml   Output 2700 ml   Net -1490 ml     I/O last 3 completed shifts: In: 740 [P.O.:480; I.V.:10; IV Piggyback:250]  Out: 9329 [Urine:2975]  I/O this shift:   In: 480 [P.O.:480]  Out: 800 [Urine:800]      Meds:    carvedilol  6.25 mg Oral BID WC    nicotine  1 patch Transdermal Daily    cefTRIAXone (ROCEPHIN) IV  1,000 mg Intravenous Q24H    azithromycin  500 mg Intravenous Q24H    amLODIPine  10 mg Oral Daily    aspirin  81 mg Oral Daily    atorvastatin  40 mg Oral Nightly    pregabalin  75 mg Oral Nightly    sertraline  50 mg Oral Daily    sodium chloride flush  10 mL Intravenous 2 times per day    insulin lispro  0-6 Units Subcutaneous TID WC    insulin lispro  0-3 Units Subcutaneous Nightly    heparin (porcine)  5,000 Units Subcutaneous 3 times per day    influenza virus vaccine  0.5 mL Intramuscular Prior to discharge    furosemide  40 mg Intravenous BID       Infusions:    dextrose           PRN Meds: glucose, dextrose, glucagon (rDNA), dextrose, sodium chloride flush, promethazine **OR** ondansetron, polyethylene glycol, acetaminophen **OR** acetaminophen, labetalol, oxyCODONE, LORazepam    Labs/imaging:  CBC:   Recent Labs     02/23/21  0248 02/24/21  0412   WBC 15.5* 15.2*   HGB 9.7* 8.4*    341         BMP:    Recent Labs     02/23/21  0248 02/24/21  0412    139   K 3.3* 3.7   * 109   CO2 21 20*   BUN 35* 40*   CREATININE 2.3* 2.5*   GLUCOSE 49* 127*         Hepatic:   Recent Labs     02/23/21  0248 02/24/21  0412   AST 22 20   ALT 16 11   BILITOT <0.2 <0.2   ALKPHOS 190* 171*       Troponin:   Recent Labs     02/23/21  0716 02/23/21  1158 02/23/21  1546   TROPONINI 0.07* 0.07* 0.06*         BNP: No results for input(s): BNP in the last 72 hours. INR: No results for input(s): INR in the last 72 hours. Reviewed imaging and reports noted      Assessment:  Active Problems:    Type 2 diabetes mellitus, with long-term current use of insulin (HCC)    Mixed hyperlipidemia    History of migraine    Tobacco dependence    Chronic kidney disease (CKD), stage III (moderate)    History of CVA (cerebrovascular accident)    HTN (hypertension), benign    Panic disorder without agoraphobia    Acute on chronic diastolic heart failure (HCC)    Diabetic peripheral neuropathy ([facility] [address]  Resolved Problems:    * No resolved hospital problems.  *        Plan:  Acute on chronic diastolic congestive heart failure  -Appreciate nephrology recommendations  -Continue IV Lasix, continue to monitor        Acute kidney injury superimposed on chronic kidney disease stage IIIb  -Appreciate nephrology recommendations  -Continue diuresis, avoid nephrotoxins, continue to monitor      Possible bacterial pneumonia  -Procalcitonin within normal limits, no current symptoms  -Repeat chest x-ray and likely discontinue antibiotics if x-ray improved Essential hypertension  -BP variable, CPM, CTM      Controlled insulin-dependent diabetes mellitus type 2 with nephropathy  -Check A1c  -Hold Lantus, continue sliding scale insulin moderate, continue to monitor      History of CVA  -Continue aspirin, statin      Diabetic peripheral neuropathy  -Continue Lyrica      Panic disorder without agoraphobia  -Continue sertraline, as needed Ativan      Tobacco dependence  -NicoDerm, encouraged and advised to quit      Dispo: Discharge likely in 2 to 3 days depending on improvement.         Diet: DIET LOW SODIUM 2 GM; Carb Control: 4 carb choices (60 gms)/meal; 2000 ml    Activity: Up with assist  Prophylaxis: Heparin    Code status: Full Code     ----------        Xander Rodriguez MD  -------------------------------  Ashley hospitalist

## 2021-02-24 NOTE — PLAN OF CARE
Problem: Falls - Risk of:  Goal: Will remain free from falls  Description: Will remain free from falls  2/24/2021 0054 by Skip Yanez RN  Outcome: Ongoing     Problem: Falls - Risk of:  Goal: Absence of physical injury  Description: Absence of physical injury  2/24/2021 0054 by Skip Yanez RN  Outcome: Ongoing     Problem: OXYGENATION/RESPIRATORY FUNCTION  Goal: Patient will maintain patent airway  Outcome: Ongoing     Problem: OXYGENATION/RESPIRATORY FUNCTION  Goal: Patient will achieve/maintain normal respiratory rate/effort  Description: Respiratory rate and effort will be within normal limits for the patient  Outcome: Ongoing     Problem: HEMODYNAMIC STATUS  Goal: Patient has stable vital signs and fluid balance  Outcome: Ongoing     Problem: FLUID AND ELECTROLYTE IMBALANCE  Goal: Fluid and electrolyte balance are achieved/maintained  Outcome: Ongoing     Problem: ACTIVITY INTOLERANCE/IMPAIRED MOBILITY  Goal: Mobility/activity is maintained at optimum level for patient  Outcome: Ongoing

## 2021-02-24 NOTE — PROGRESS NOTES
[facility] Note    Patient Active Problem List   Diagnosis    Type 2 diabetes mellitus, with long-term current use of insulin ([address]    Mixed hyperlipidemia    Migraine headache    Anemia    Diabetic foot infection ([address]    Pyogenic inflammation of bone (HCC)    History of migraine    History of medication noncompliance    Overweight (BMI 25.0-29. 9)    Osteomyelitis of left foot (HCC)    Nephrotic syndrome    Peripheral edema    Pulmonary edema    Right sided numbness    Tobacco dependence    Chronic kidney disease (CKD), stage III (moderate)    Chronic diastolic (congestive) heart failure (HCC)    Cerebrovascular accident (CVA) ([address]    Arterial ischemic stroke, ICA, left, acute ([address]    HTN (hypertension), benign    DM (diabetes mellitus), secondary, uncontrolled, w/neurologic complic ([address]    Dyslipidemia    Smoker    Panic disorder without agoraphobia    Isolated proteinuria    Acute on chronic diastolic heart failure (HCC)       Past Medical History:   has a past medical history of Cerebral artery occlusion with cerebral infarction ([address] CHF (congestive heart failure) ([address] Chronic kidney disease, Diabetes mellitus out of control ([address] Diabetes mellitus, type II ([address] Generalized headaches, Hypertension, Infertility, Insomnia, Migraine headache, Mixed hyperlipidemia, Otitis media, Pelvic abscess in female, and Pneumonia. Past Social History:   reports that she has been smoking cigarettes. She has been smoking about 1.00 pack per day. She has never used smokeless tobacco. She reports that she does not drink alcohol or use drugs. Subjective:   Swelling is better. No SOB    Review of Systems   Constitutional: Negative for activity change, appetite change, chills, fatigue, fever and unexpected weight change. HENT: Negative for congestion and facial swelling. Eyes: Negative for photophobia, discharge and redness. Respiratory: Negative for cough, chest tightness and shortness of breath. Cardiovascular: Positive for leg swelling. Negative for chest pain and palpitations. Gastrointestinal: Negative for abdominal distention, abdominal pain, blood in stool, constipation, diarrhea, nausea and vomiting. Endocrine: Negative for cold intolerance, heat intolerance and polyuria. Genitourinary: Negative for decreased urine volume, difficulty urinating, flank pain and hematuria. Musculoskeletal: Negative for joint swelling and neck pain. Neurological: Negative for dizziness, seizures, syncope, speech difficulty, light-headedness and headaches. Hematological: Does not bruise/bleed easily. Psychiatric/Behavioral: Negative for agitation, confusion and hallucinations. Objective:      BP (!) 160/79   Pulse 94   Temp 98.5 °F (36.9 °C) (Oral)   Resp 18   Ht 5' 7\" (1.702 m)   Wt 238 lb 12.8 oz (108.3 kg)   LMP 02/23/2021   SpO2 94%   BMI 37.40 kg/m²     Wt Readings from Last 3 Encounters:   02/24/21 238 lb 12.8 oz (108.3 kg)   08/31/20 200 lb (90.7 kg)   05/13/20 188 lb (85.3 kg)       BP Readings from Last 3 Encounters:   02/24/21 (!) 160/79   08/31/20 137/83   05/13/20 132/69     Chest- clear  Heart-regular  Abd-soft  Ext- 2+ edema    Labs  Hemoglobin   Date Value Ref Range Status   02/24/2021 8.4 (L) 12.0 - 16.0 g/dL Final     Hematocrit   Date Value Ref Range Status   02/24/2021 26.9 (L) 36.0 - 48.0 % Final     WBC   Date Value Ref Range Status   02/24/2021 15.2 (H) 4.0 - 11.0 K/uL Final     Platelets   Date Value Ref Range Status   02/24/2021 341 135 - 450 K/uL Final     Lab Results   Component Value Date    CREATININE 2.5 (H) 02/24/2021    BUN 40 (H) 02/24/2021     02/24/2021    K 3.7 02/24/2021     02/24/2021    CO2 20 (L) 02/24/2021       Assessment/Plan:  1. KINZA on CKD III, baseline creatinine 1.3 to 1.6. The patient  currently appears hypervolemic.   Probably due to decreased renal  perfusion in the setting of pneumonia plus ACE inhibitor use plus  possible venous congestion. Agree with Lasix IV, plan to switch to po tomorrow. Crea slightly higher today. We will plan to start spironolactone tomorrow. Continue ceftriaxone and Zithromax as antibiotic therapy for pneumonia. 2.  Hypokalemia, replaced. 3.  History of nephrotic range proteinuria probably from diabetes  mellitus. Probably has hyperactive RAAS at this time, which may have  explained sudden weight gain. Add spironolactone tomorrow if creatinine  is better. 4.  Hypertension. Continue diuresis. No need for aggressive control at  this time. 5.  Weight gain plus lower extremity edema. IV Lasix for today. 6.  History of diabetes mellitus. Management as per Medicine.   7.  Dispo-hopefully Friday or Saturday    Leena Kay MD

## 2021-02-25 LAB
A/G RATIO: 0.8 (ref 1.1–2.2)
ALBUMIN SERPL-MCNC: 2.6 G/DL (ref 3.4–5)
ALP BLD-CCNC: 169 U/L (ref 40–129)
ALT SERPL-CCNC: 13 U/L (ref 10–40)
ANION GAP SERPL CALCULATED.3IONS-SCNC: 9 MMOL/L (ref 3–16)
AST SERPL-CCNC: 18 U/L (ref 15–37)
BILIRUB SERPL-MCNC: <0.2 MG/DL (ref 0–1)
BUN BLDV-MCNC: 43 MG/DL (ref 7–20)
CALCIUM SERPL-MCNC: 8.3 MG/DL (ref 8.3–10.6)
CHLORIDE BLD-SCNC: 105 MMOL/L (ref 99–110)
CO2: 21 MMOL/L (ref 21–32)
CREAT SERPL-MCNC: 2.5 MG/DL (ref 0.6–1.1)
ESTIMATED AVERAGE GLUCOSE: 208.7 MG/DL
GFR AFRICAN AMERICAN: 25
GFR NON-AFRICAN AMERICAN: 21
GLOBULIN: 3.4 G/DL
GLUCOSE BLD-MCNC: 173 MG/DL (ref 70–99)
GLUCOSE BLD-MCNC: 180 MG/DL (ref 70–99)
GLUCOSE BLD-MCNC: 191 MG/DL (ref 70–99)
GLUCOSE BLD-MCNC: 195 MG/DL (ref 70–99)
GLUCOSE BLD-MCNC: 203 MG/DL (ref 70–99)
HBA1C MFR BLD: 8.9 %
HCT VFR BLD CALC: 25.7 % (ref 36–48)
HEMOGLOBIN: 8.2 G/DL (ref 12–16)
L. PNEUMOPHILA SEROGP 1 UR AG: NORMAL
MAGNESIUM: 1.9 MG/DL (ref 1.8–2.4)
MCH RBC QN AUTO: 27.9 PG (ref 26–34)
MCHC RBC AUTO-ENTMCNC: 32 G/DL (ref 31–36)
MCV RBC AUTO: 87.2 FL (ref 80–100)
PDW BLD-RTO: 16.1 % (ref 12.4–15.4)
PERFORMED ON: ABNORMAL
PHOSPHORUS: 5.3 MG/DL (ref 2.5–4.9)
PLATELET # BLD: 300 K/UL (ref 135–450)
PMV BLD AUTO: 9.3 FL (ref 5–10.5)
POTASSIUM SERPL-SCNC: 4 MMOL/L (ref 3.5–5.1)
PRO-BNP: 1103 PG/ML (ref 0–124)
RBC # BLD: 2.95 M/UL (ref 4–5.2)
SODIUM BLD-SCNC: 135 MMOL/L (ref 136–145)
STREP PNEUMONIAE ANTIGEN, URINE: NORMAL
TOTAL PROTEIN: 6 G/DL (ref 6.4–8.2)
WBC # BLD: 12.2 K/UL (ref 4–11)

## 2021-02-25 PROCEDURE — 36415 COLL VENOUS BLD VENIPUNCTURE: CPT

## 2021-02-25 PROCEDURE — 6360000002 HC RX W HCPCS: Performed by: INTERNAL MEDICINE

## 2021-02-25 PROCEDURE — 6370000000 HC RX 637 (ALT 250 FOR IP): Performed by: INTERNAL MEDICINE

## 2021-02-25 PROCEDURE — 97535 SELF CARE MNGMENT TRAINING: CPT

## 2021-02-25 PROCEDURE — 83735 ASSAY OF MAGNESIUM: CPT

## 2021-02-25 PROCEDURE — 97530 THERAPEUTIC ACTIVITIES: CPT

## 2021-02-25 PROCEDURE — 94760 N-INVAS EAR/PLS OXIMETRY 1: CPT

## 2021-02-25 PROCEDURE — 6360000002 HC RX W HCPCS: Performed by: EMERGENCY MEDICINE

## 2021-02-25 PROCEDURE — 2580000003 HC RX 258: Performed by: EMERGENCY MEDICINE

## 2021-02-25 PROCEDURE — 84100 ASSAY OF PHOSPHORUS: CPT

## 2021-02-25 PROCEDURE — 97166 OT EVAL MOD COMPLEX 45 MIN: CPT

## 2021-02-25 PROCEDURE — 2580000003 HC RX 258: Performed by: INTERNAL MEDICINE

## 2021-02-25 PROCEDURE — 85027 COMPLETE CBC AUTOMATED: CPT

## 2021-02-25 PROCEDURE — 6370000000 HC RX 637 (ALT 250 FOR IP): Performed by: FAMILY MEDICINE

## 2021-02-25 PROCEDURE — 83880 ASSAY OF NATRIURETIC PEPTIDE: CPT

## 2021-02-25 PROCEDURE — 80053 COMPREHEN METABOLIC PANEL: CPT

## 2021-02-25 PROCEDURE — 2060000000 HC ICU INTERMEDIATE R&B

## 2021-02-25 PROCEDURE — 97161 PT EVAL LOW COMPLEX 20 MIN: CPT

## 2021-02-25 RX ORDER — SPIRONOLACTONE 25 MG/1
25 TABLET ORAL DAILY
Status: DISCONTINUED | OUTPATIENT
Start: 2021-02-25 | End: 2021-02-26 | Stop reason: HOSPADM

## 2021-02-25 RX ORDER — FUROSEMIDE 40 MG/1
40 TABLET ORAL 2 TIMES DAILY
Status: DISCONTINUED | OUTPATIENT
Start: 2021-02-25 | End: 2021-02-26

## 2021-02-25 RX ADMIN — CARVEDILOL 6.25 MG: 6.25 TABLET, FILM COATED ORAL at 11:44

## 2021-02-25 RX ADMIN — OXYCODONE 5 MG: 5 TABLET ORAL at 04:48

## 2021-02-25 RX ADMIN — INSULIN LISPRO 4 UNITS: 100 INJECTION, SOLUTION INTRAVENOUS; SUBCUTANEOUS at 11:59

## 2021-02-25 RX ADMIN — AMLODIPINE BESYLATE 10 MG: 5 TABLET ORAL at 11:44

## 2021-02-25 RX ADMIN — Medication 10 ML: at 11:53

## 2021-02-25 RX ADMIN — OXYCODONE 5 MG: 5 TABLET ORAL at 17:43

## 2021-02-25 RX ADMIN — HEPARIN SODIUM 5000 UNITS: 5000 INJECTION INTRAVENOUS; SUBCUTANEOUS at 17:39

## 2021-02-25 RX ADMIN — HEPARIN SODIUM 5000 UNITS: 5000 INJECTION INTRAVENOUS; SUBCUTANEOUS at 06:01

## 2021-02-25 RX ADMIN — SPIRONOLACTONE 25 MG: 25 TABLET ORAL at 11:44

## 2021-02-25 RX ADMIN — INSULIN GLARGINE 16 UNITS: 100 INJECTION, SOLUTION SUBCUTANEOUS at 11:46

## 2021-02-25 RX ADMIN — AZITHROMYCIN MONOHYDRATE 500 MG: 500 INJECTION, POWDER, LYOPHILIZED, FOR SOLUTION INTRAVENOUS at 04:28

## 2021-02-25 RX ADMIN — OXYCODONE 5 MG: 5 TABLET ORAL at 11:52

## 2021-02-25 RX ADMIN — INSULIN LISPRO 1 UNITS: 100 INJECTION, SOLUTION INTRAVENOUS; SUBCUTANEOUS at 20:30

## 2021-02-25 RX ADMIN — INSULIN LISPRO 2 UNITS: 100 INJECTION, SOLUTION INTRAVENOUS; SUBCUTANEOUS at 08:14

## 2021-02-25 RX ADMIN — CARVEDILOL 6.25 MG: 6.25 TABLET, FILM COATED ORAL at 17:38

## 2021-02-25 RX ADMIN — Medication 10 ML: at 20:30

## 2021-02-25 RX ADMIN — SERTRALINE 50 MG: 50 TABLET, FILM COATED ORAL at 11:45

## 2021-02-25 RX ADMIN — ATORVASTATIN CALCIUM 40 MG: 40 TABLET, FILM COATED ORAL at 20:26

## 2021-02-25 RX ADMIN — PREGABALIN 75 MG: 75 CAPSULE ORAL at 20:26

## 2021-02-25 RX ADMIN — FUROSEMIDE 40 MG: 40 TABLET ORAL at 11:44

## 2021-02-25 RX ADMIN — Medication 1000 MG: at 04:28

## 2021-02-25 RX ADMIN — Medication 10 ML: at 04:30

## 2021-02-25 RX ADMIN — OXYCODONE 5 MG: 5 TABLET ORAL at 23:50

## 2021-02-25 RX ADMIN — ASPIRIN 81 MG: 81 TABLET, FILM COATED ORAL at 11:43

## 2021-02-25 RX ADMIN — FUROSEMIDE 40 MG: 40 TABLET ORAL at 17:38

## 2021-02-25 ASSESSMENT — ENCOUNTER SYMPTOMS
EYE REDNESS: 0
ABDOMINAL DISTENTION: 0
CHEST TIGHTNESS: 0
CONSTIPATION: 0
FACIAL SWELLING: 0
PHOTOPHOBIA: 0
VOMITING: 0
COUGH: 0
EYE DISCHARGE: 0
SHORTNESS OF BREATH: 0
ABDOMINAL PAIN: 0
NAUSEA: 0
DIARRHEA: 0
BLOOD IN STOOL: 0

## 2021-02-25 ASSESSMENT — PAIN SCALES - GENERAL
PAINLEVEL_OUTOF10: 3
PAINLEVEL_OUTOF10: 7
PAINLEVEL_OUTOF10: 4
PAINLEVEL_OUTOF10: 4

## 2021-02-25 ASSESSMENT — PAIN DESCRIPTION - LOCATION
LOCATION: FOOT
LOCATION: FOOT

## 2021-02-25 ASSESSMENT — PAIN DESCRIPTION - DESCRIPTORS: DESCRIPTORS: BURNING

## 2021-02-25 ASSESSMENT — PAIN DESCRIPTION - PAIN TYPE
TYPE: NEUROPATHIC PAIN
TYPE: NEUROPATHIC PAIN

## 2021-02-25 ASSESSMENT — PAIN DESCRIPTION - ORIENTATION
ORIENTATION: RIGHT;LEFT
ORIENTATION: RIGHT;LEFT

## 2021-02-25 NOTE — PROGRESS NOTES
Physical Therapy    Facility/Department: [address]  Initial Assessment    NAME: Aubrie Gonzales  : 1975  MRN: [id number]    Date of Service: 2021    Discharge Recommendations: Aubrie Gonzales scored a 18/24 on the AM-PAC short mobility form. Current research shows that an AM-PAC score of 17 or less is typically not associated with a discharge to the patient's home setting. Based on the patient's AM-PAC score and their current functional mobility deficits, it is recommended that the patient have 5-7 sessions per week of Physical Therapy at d/c to increase the patient's independence. At this time, this patient demonstrates the endurance, and/or tolerance for 3 hours of therapy each day, with a treatment frequency of 5-7x/wk. Please see assessment section for further patient specific details. Recommending further PT as pt has multiple health issues currently causing significant debilitation and never participated in any rehab following CVA in 2020. Pt has had 8 falls over a 3 month period and is concerned with safely returning home and avoiding future falls. Pt's  works full time and pt is often home alone during the day. Pt has also had numerous personal issues in the last year with multiple family members dying from Covid-25. If patient discharges prior to next session this note will serve as a discharge summary. Please see below for the latest assessment towards goals. PT Equipment Recommendations  Equipment Needed: No    Assessment   Body structures, Functions, Activity limitations: Decreased functional mobility ; Decreased sensation;Decreased balance;Decreased vision/visual deficit; Decreased strength;Decreased endurance  Assessment: 40 yo female admitted with increased falls, CHF, acute kidney injury, diabetic neuropathy, previous CVA. Pt presents with decreased functional mobility and poor endurance.  Pt will benefit from con't skilled PT to facilitate safe last 2 days. She also reports nonproductive cough that has been present for 2 days. She reports subjective fever yesterday. She does not have chills. Blood pressure was elevated in ER. Vision/Hearing  Vision: Impaired  Vision Exceptions: (low vision)  Hearing: Within functional limits       Subjective  General  Chart Reviewed: Yes  Patient assessed for rehabilitation services?: Yes  Family / Caregiver Present: No  General Comment  Comments: Pt seated on EOB upon arrival. Pleasant and agreeable to PT evaluation. Subjective  Subjective: \"I don't know what happens when I start to go down. It just happens. \"  Pain Screening  Patient Currently in Pain: No  Vital Signs  Patient Currently in Pain: No       Orientation  Orientation  Overall Orientation Status: Within Normal Limits     Social/Functional History  Social/Functional History  Lives With: Spouse  Type of Home: (condo)  Home Layout: One level  Home Access: Level entry  Bathroom Shower/Tub: Walk-in shower, Shower chair with back  Bathroom Toilet: Standard  Bathroom Equipment: Grab bars in shower(patient has difficulty with sit to stand from low toilet)  Bathroom Accessibility: Accessible  Home Equipment: Rolling walker  Receives Help From: Family  ADL Assistance: Independent  Homemaking Assistance: Needs assistance(shared cooking)  Homemaking Responsibilities: No  Ambulation Assistance: Independent  Transfer Assistance: Independent  Active : No  Additional Comments: retinopathy, 8 falls in three weeks     Cognition   Cognition  Overall Cognitive Status: WFL    Objective     Observation/Palpation  Posture: Fair    AROM RLE (degrees)  RLE AROM: WNL  AROM LLE (degrees)  LLE AROM : WNL  Strength RLE  Strength RLE: WFL  Comment: 5/5 hip flex, 5/5 knee flex, 5/5 knee ext, 4+/5 ankle DF, 4+/5 ankle PF  Strength LLE  Strength LLE: WFL  Comment: 5/5 hip flex, 5/5 knee flex, 5/5 knee ext, 4+/5 ankle DF, 4+/5 ankle PF  Tone RLE  RLE Tone: Normotonic  Tone LLE  LLE

## 2021-02-25 NOTE — PROGRESS NOTES
[facility] Note    Patient Active Problem List   Diagnosis    Type 2 diabetes mellitus, with long-term current use of insulin ([address]    Mixed hyperlipidemia    Migraine headache    Anemia    Diabetic foot infection ([address]    Pyogenic inflammation of bone (HCC)    History of migraine    History of medication noncompliance    Overweight (BMI 25.0-29. 9)    Osteomyelitis of left foot (HCC)    Nephrotic syndrome    Peripheral edema    Pulmonary edema    Right sided numbness    Tobacco dependence    Chronic kidney disease (CKD), stage III (moderate)    Chronic diastolic (congestive) heart failure (HCC)    History of CVA (cerebrovascular accident)    Arterial ischemic stroke, ICA, left, acute ([address]    HTN (hypertension), benign    DM (diabetes mellitus), secondary, uncontrolled, w/neurologic complic ([address]    Dyslipidemia    Smoker    Panic disorder without agoraphobia    Isolated proteinuria    Acute on chronic diastolic heart failure ([address]    Diabetic peripheral neuropathy ([address]       Past Medical History:   has a past medical history of Cerebral artery occlusion with cerebral infarction ([address] CHF (congestive heart failure) ([address] Chronic kidney disease, Diabetes mellitus out of control ([address] Diabetes mellitus, type II ([address] Generalized headaches, Hypertension, Infertility, Insomnia, Migraine headache, Mixed hyperlipidemia, Otitis media, Pelvic abscess in female, and Pneumonia. Past Social History:   reports that she has been smoking cigarettes. She has been smoking about 1.00 pack per day. She has never used smokeless tobacco. She reports that she does not drink alcohol or use drugs. Subjective:   Swelling is better. No SOB. -5L since admission. Review of Systems   Constitutional: Negative for activity change, appetite change, chills, fatigue, fever and unexpected weight change.    HENT: Negative for congestion and facial swelling. Eyes: Negative for photophobia, discharge and redness. Respiratory: Negative for cough, chest tightness and shortness of breath. Cardiovascular: Positive for leg swelling. Negative for chest pain and palpitations. Gastrointestinal: Negative for abdominal distention, abdominal pain, blood in stool, constipation, diarrhea, nausea and vomiting. Endocrine: Negative for cold intolerance, heat intolerance and polyuria. Genitourinary: Negative for decreased urine volume, difficulty urinating, flank pain and hematuria. Musculoskeletal: Negative for joint swelling and neck pain. Neurological: Negative for dizziness, seizures, syncope, speech difficulty, light-headedness and headaches. Hematological: Does not bruise/bleed easily. Psychiatric/Behavioral: Negative for agitation, confusion and hallucinations. Objective:      BP (!) 145/83   Pulse 84   Temp 98.1 °F (36.7 °C) (Oral)   Resp 18   Ht 5' 7\" (1.702 m)   Wt 236 lb 12.8 oz (107.4 kg)   LMP 02/23/2021   SpO2 95%   BMI 37.09 kg/m²     Wt Readings from Last 3 Encounters:   02/25/21 236 lb 12.8 oz (107.4 kg)   08/31/20 200 lb (90.7 kg)   05/13/20 188 lb (85.3 kg)       BP Readings from Last 3 Encounters:   02/25/21 (!) 145/83   08/31/20 137/83   05/13/20 132/69     Chest- clear  Heart-regular  Abd-soft  Ext- 2+ edema, decreased    Labs  Hemoglobin   Date Value Ref Range Status   02/25/2021 8.2 (L) 12.0 - 16.0 g/dL Final     Hematocrit   Date Value Ref Range Status   02/25/2021 25.7 (L) 36.0 - 48.0 % Final     WBC   Date Value Ref Range Status   02/25/2021 12.2 (H) 4.0 - 11.0 K/uL Final     Platelets   Date Value Ref Range Status   02/25/2021 300 135 - 450 K/uL Final     Lab Results   Component Value Date    CREATININE 2.5 (H) 02/25/2021    BUN 43 (H) 02/25/2021     (L) 02/25/2021    K 4.0 02/25/2021     02/25/2021    CO2 21 02/25/2021       Assessment/Plan:  1. KINZA on CKD III, baseline creatinine 1.3 to 1.6.   The patient  currently appears hypervolemic. Probably due to decreased renal  perfusion in the setting of pneumonia plus ACE inhibitor use plus  possible venous congestion. Switch to po Lasix. Start Spironolactone. Crea stable today. 2.  Hypokalemia, replaced. 3.  History of nephrotic range proteinuria probably from diabetes  mellitus. Probably has hyperactive RAAS at this time, which may have  explained sudden weight gain. Add spironolactone tomorrow if creatinine  is better. 4.  Hypertension. Continue diuresis. No need for aggressive control at  this time. 5.  Weight gain plus lower extremity edema. Better. 6.  History of diabetes mellitus. Management as per Medicine. 7.  Dispo-hopefully tomorrow if crea stable and adequate response to po diuretics.      Earnest Frankel, MD

## 2021-02-25 NOTE — PROGRESS NOTES
Nutrition Note    CHF diet education    Pt was educated on CHF diet. RD reviewed guidelines and pt verbalized understanding. Pt accepted handouts.      CHF 10 mins    Electronically signed by Jac Barrios RD, CNSC, LD on 2/25/21 at 2:00 PM EST    Contact: [phone]

## 2021-02-25 NOTE — PROGRESS NOTES
[facility] 40      Gianni Barry 1975    History:  Past Medical History:   Diagnosis Date    [facility]bral [facility] occlusion [facility] infarction [facility]     CHF (congestive heart failure) (HCC)     Chronic kidney disease     Diabetes mellitus out of control ([facility]     Diabetes mellitus, type II ([facility]     2005    Generalized headaches     Hypertension     Infertility     Insomnia     chronic vs lack of time spent to sleep    Migraine headache 11/9/2011    Mixed hyperlipidemia     Otitis media     h/o recurrent    Pelvic abscess in female 10/5/2013    Pneumonia     2004 approx. ECHO:  Findings      Left Ventricle   Normal left ventricle size and systolic function with an estimated ejection   fraction of 55%. There is moderate concentric left ventricular hypertrophy. Diastolic dysfunction with normal LV filling pressures. Mitral Valve   The mitral valve normal in structure. Mild mitral regurgitation. Left Atrium   The left atrium is normal in size. A bubble study was performed and fails to show evidence of shunting. Aortic Valve   The aortic valve is structurally normal. There is no significant aortic   valve regurgitation or stenosis. Aorta   The aortic root is normal in size. Right Ventricle   The right ventricle is normal in size and function. Tricuspid Valve   The tricuspid valve is normal in structure. Trivial tricuspid regurgitation. Right Atrium   The right atrial size is normal.      Pulmonic Valve   The pulmonic valve is not well visualized. There is no evidence of pulmonic valve regurgitation or stenosis. Pericardial Effusion   No pericardial effusion noted. Pleural Effusion   No pleural effusion. Miscellaneous   The inferior vena cava appears normal in size with normal respiratory   variation.       History of sleep apnea  no  [place] Screen ordered: yes    DM History: Yes  DM medications: insulin sliding scale, eveline    Last Hospital Admission: 20 with CVA  Code Status: full code  Discharge plans: from home with spouse    Family Present: johnny Diggs was admitted to the hospital with increased shortness of breath. Patient states HF is a fairly new diagnosis. She has been following with nephrology as an outpatient. She had been weighing herself daily until beginning of January when her mother was sick and unfortunately passed. Her baseline weight prior was about 206 lb on her home scale. She states she had a lot of stress with decision making for her mother's care and  that she was not performing self care with daily weights or calling with symptoms. She does not salt to season or flavor foods but admits her and her spouse resort to processed foods at home. She has a difficult time reading labels, seeing food while cooking and resorts to convenience foods. She has been instructed to follow 2 liter fluid restriction by nephrology. She admits d/t DM, she is frequently thirsty and may drink over her restriction. She is compliant with medications and follow ups    Patient provided with both written and verbal education on CHF signs/ symptoms, causes, discharge medications, daily weights, low sodium diet, activity, and follow-up. Pt to call if gains 3 pounds in one day or 5 pounds in one week. Mutually agreed upon goals were discussed such as calling the MD as soon as they recognize symptoms and weight gain, maintaining his proper diet, taking medications as prescribed, joining rehab when able. Patient provided with CHF Zone Management tool and CHF symptoms magnet. Discussed importance of lifestyle changes: agrees to daily weights, logging weights    PATIENT/CAREGIVER TEACHING:    Level of patient/caregiver understanding able to:   [x] Verbalize understanding [ ] Demonstrate understanding [ ] Teach back   [ ] Needs reinforcement [ ] Other:       Time spent teachin mins    1.

## 2021-02-25 NOTE — PROGRESS NOTES
Occupational Therapy   Occupational Therapy Initial Assessment  Date: 2021   Patient Name: Getachew Valverde  MRN: [id number]     : 1975    Date of Service: 2021    Discharge Recommendations:    Getachew Valverde scored a 18/24 on the AM-PAC ADL Inpatient form. Current research shows that an AM-PAC score of 17 or less is typically not associated with a discharge to the patient's home setting. Based on the patient's AM-PAC score and their current ADL deficits, it is recommended that the patient have 5-7 sessions per week of Occupational Therapy at d/c to increase the patient's independence. At this time, this patient demonstrates the endurance, and/or tolerance for 3 hours of therapy each day, with a treatment frequency of 5-7x/wk. Please see assessment section for further patient specific details. If patient discharges prior to next session this note will serve as a discharge summary. Please see below for the latest assessment towards goals. Patient has multiple co morbidities with frequent falls. Patient had no therapy following CVA in August.  Patient also has had multiple deaths of family members from covid 23 in the past year. She was not tested so she is not sure if she had it or not. She was just told to quarantine. Her  tested positive. Patient would benefit from intensive therapy to increase her safety at discharge. Assessment   Performance deficits / Impairments: Decreased functional mobility ; Decreased ADL status; Decreased high-level IADLs;Decreased sensation;Decreased endurance;Decreased fine motor control;Decreased cognition;Decreased vision/visual deficit; Decreased safe awareness;Decreased balance;Decreased coordination  Treatment Diagnosis: decreased independence with ADL due to late affects of CVA, neuropathy, CHF, diabetic retinopathy , questionable affects of depression due to loss of father, mother, and mother in law in past 6 mox  Prognosis: Good  Decision Making: Medium Complexity  Assistance / Modification: rw, physical assist  OT Education: OT Role;Plan of Care;ADL Adaptive Strategies; Family Education;IADL Safety;Equipment  Patient Education: OT evaluation, plan of care, discharge planning  Barriers to Learning: low vision  REQUIRES OT FOLLOW UP: Yes  Activity Tolerance  Activity Tolerance: Patient Tolerated treatment well  Safety Devices  Safety Devices in place: Yes  Type of devices: All fall risk precautions in place;Call light within reach; Patient at risk for falls; Left in bed           Patient Diagnosis(es): There were no encounter diagnoses. has a past medical history of Cerebral artery occlusion with cerebral infarction ([facility] CHF (congestive heart failure) ([facility] Chronic kidney disease, Diabetes mellitus out of control ([facility] Diabetes mellitus, type II ([facility] Generalized headaches, Hypertension, Infertility, Insomnia, Migraine headache, Mixed hyperlipidemia, Otitis media, Pelvic abscess in female, and Pneumonia. has a past surgical history that includes Cervix surgery and eye surgery. Treatment Diagnosis: decreased independence with ADL due to late affects of CVA, neuropathy, CHF, diabetic retinopathy , questionable affects of depression due to loss of father, mother, and mother in law in past 10 mox      Restrictions  Restrictions/Precautions  Restrictions/Precautions: Fall Risk(High fall risk)  Required Braces or Orthoses?: No  Position Activity Restriction  Other position/activity restrictions:  This is a pleasant 39 y.o. female with history of chronic diastolic CHF (most recent echocardiogram from August 2020 with ejection fraction of 55%), history of nephrotic syndrome, CKD stage III (with baseline creatinine of 1.3-1.6), history of CVA, uncontrolled diabetes type 2, essential hypertension, hyperlipidemia, obesity with BMI of 37 kg/m², chronic nonzero troponin elevation, who presents to the emergency room with complaints of shortness of breath, orthopnea, about 38 pound weight gain over the last 2 days. She also reports nonproductive cough that has been present for 2 days. She reports subjective fever yesterday. She does not have chills. Blood pressure was elevated in ER. Subjective   General  Chart Reviewed: Yes  Patient assessed for rehabilitation services?: Yes  Family / Caregiver Present: No  Diagnosis: pneumonia, CHF  Subjective  Subjective: Patient up in chair and talking about loss of parents to covid and reported her entire family had covid and she's not sure if she had covid because she was just told to quarantine and was not tested.   Patient Currently in Pain: No  Vital Signs  BP Location: Right upper arm  Orthostatic B/P and Pulse?: Yes  Blood Pressure Sittin/83  Pulse Sittin PER MINUTE  Blood Pressure Standin/83  Pulse Standin PER MINUTE  Patient Currently in Pain: No  Orthostatic B/P and Pulse?: Yes  Social/Functional History  Social/Functional History  Lives With: Spouse  Type of Home: (condo)  Home Layout: One level  Home Access: Level entry  Bathroom Shower/Tub: Walk-in shower, Shower chair with back  Bathroom Toilet: Standard  Bathroom Equipment: Grab bars in shower(patient has difficulty with sit to stand from low toilet)  Bathroom Accessibility: Accessible  Home Equipment: Rolling walker  Receives Help From: Family  ADL Assistance: Independent  Homemaking Assistance: Needs assistance(shared cooking)  Homemaking Responsibilities: No  Ambulation Assistance: Independent  Transfer Assistance: Independent  Active : No  Additional Comments: retinopathy, 8 falls in three weeks       Objective   Vision: Impaired  Vision Exceptions: (low vision)  Hearing: Within functional limits    Orientation  Overall Orientation Status: Within Normal Limits  Observation/Palpation  Posture: Fair  Balance  Sitting Balance: Supervision  Standing Balance: Modified independent   Standing Balance  Time: up to 5 minutes  Activity: ambulation to hallway  Functional Mobility  Functional - Mobility Device: Rolling Walker  Activity: Other  Assist Level: Contact guard assistance  Functional Mobility Comments: ambulated about 40 feet with rw, felt fatigued, decreased step length  ADL  Grooming: Setup;Supervision  UE Bathing: Setup;Supervision  LE Bathing: Minimal assistance  UE Dressing: Minimal assistance  LE Dressing: Minimal assistance  Toileting: Minimal assistance  Additional Comments: patient very slow gait speed, very short step length, ambulated in room with rw  Tone RUE  RUE Tone: Normotonic  Tone LUE  LUE Tone: Normotonic  Coordination  Movements Are Fluid And Coordinated: Yes     Bed mobility  Scooting: Supervision  Transfers  Sit to stand: Supervision  Stand to sit: Supervision  Vision - Basic Assessment  Visual History: Other (add comment)(retinopathy)  Patient Visual Report: Balance difficulty  Vision Comments: patient was able to see phone  Cognition  Overall Cognitive Status: WFL  Perception  Overall Perceptual Status: WFL     Sensation  Overall Sensation Status: Impaired  Light Touch: Partial deficits in the RUE;Partial deficits in the LUE  Additional Comments: patient has very short step lengthd        LUE AROM (degrees)  LUE AROM : WFL  RUE AROM (degrees)  RUE AROM : WFL  LUE Strength  Gross LUE Strength: WFL  RUE Strength  Gross RUE Strength: WFL                   Plan   Plan  Times per week: 3-5  Current Treatment Recommendations: Balance Training, Functional Mobility Training, Safety Education & Training, Self-Care / ADL, Equipment Evaluation, Education, & procurement, Neuromuscular Re-education, Endurance Training, Patient/Caregiver Education & Training    G-Code     OutComes Score                                                  AM-PAC Score        AM-[facility] Inpatient Daily Activity Raw Score: 18 (02/25/21 1110)  AM-PAC Inpatient ADL T-Scale Score : 38.66 (02/25/21 1110)  ADL Inpatient CMS 0-100% Score: 46.65 (02/25/21

## 2021-02-25 NOTE — DISCHARGE INSTR - COC
Continuity of Care Form    Patient Name: Pamela Muñoz   :  1975  MRN:  [id number]    Admit date:  2021  Discharge date:  ***    Code Status Order: Full Code   Advance Directives:   Advance Care Flowsheet Documentation     Date/Time Healthcare Directive Type of Healthcare Directive Copy in [address] Agent's Name Healthcare Agent's Phone Number    21 1624  No, patient does not have an advance directive for healthcare treatment -- -- -- -- --          Admitting Physician:  Abad Mojica MD  PCP: NORA Wade    Discharging Nurse: [facility] Unit/Room#: 5SC-4458/[id number]  Discharging Unit Phone Number: ***    Emergency Contact:   Extended Emergency Contact Information  Primary Emergency Contact: [address] Phone: [phone]  Relation: Spouse    Past Surgical History:  Past Surgical History:   Procedure Laterality Date    CERVIX SURGERY      laser tx for dysplasia;    EYE SURGERY         Immunization History:   Immunization History   Administered Date(s) Administered    Influenza 2011    Influenza Virus Vaccine 10/05/2013    Tdap (Boostrix, Adacel) 2018       Active Problems:  Patient Active Problem List   Diagnosis Code    Type 2 diabetes mellitus, with long-term current use of insulin ([facility] [address] E11.9, Z79.4    Mixed hyperlipidemia E78.2    Migraine headache G43.909    Anemia D64.9    Diabetic foot infection ([facility] [address] E11.628, L08.9    Pyogenic inflammation of bone ([facility] [address] M86.9    History of migraine Z86.69    History of medication noncompliance Z91.14    Overweight (BMI 25.0-29. 9) E66.3    Osteomyelitis of left foot (HCC) M86.9    Nephrotic syndrome N04.9    Peripheral edema R60.9    Pulmonary edema J81.1    Right sided numbness R20.0    Tobacco dependence F17.200    Chronic kidney disease (CKD), stage III (moderate) N18.30    Chronic diastolic (congestive) heart failure (HCC) I50.32    History of CVA (cerebrovascular accident) Z86.73    Arterial ischemic stroke, ICA, left, acute ([facility]) I63.232    HTN (hypertension), benign I10    DM (diabetes mellitus), secondary, uncontrolled, w/neurologic complic ([facility]) W57.39, T62.40    Dyslipidemia E78.5    Smoker F17.200    Panic disorder without agoraphobia F41.0    Isolated proteinuria R80.0    Acute on chronic diastolic heart failure ([facility]) I50.33    Diabetic peripheral neuropathy ([facility]) E11.42       Isolation/Infection:   Isolation          No Isolation        Patient Infection Status     Infection Onset Added Last Indicated Last Indicated By Review Planned Expiration Resolved Resolved By    None active    Resolved    COVID-19 Rule Out 21 COVID-19, Rapid (Ordered)   21 Rule-Out Test Resulted    MRSA 19 Wound Culture   20 Esvin Lambert RN          Nurse Assessment:  Last Vital Signs: BP (!) 159/85   Pulse 86   Temp 98 °F (36.7 °C) (Oral)   Resp 16   Ht 5' 7\" (1.702 m)   Wt 236 lb 12.8 oz (107.4 kg)   LMP 2021   SpO2 95%   BMI 37.09 kg/m²     Last documented pain score (0-10 scale): Pain Level: 4  Last Weight:   Wt Readings from Last 1 Encounters:   21 236 lb 12.8 oz (107.4 kg)     Mental Status:  {IP PT MENTAL STATUS:20450}    IV Access:  { SARAH IV ACCESS:955383786}    Nursing Mobility/ADLs:  Walking   {CHP DME OTAQ:915551603}  Transfer  {CHP DME JPOO:169745874}  Bathing  {CHP DME LFGX:026224431}  Dressing  {CHP DME RXED:126913183}  Toileting  {CHP DME DJM}  Feeding  {CHP DME KGVX:040248600}  Med Admin  {CHP DME UZSM:562384990}  Med Delivery   { SARAH MED Delivery:920012655}    Wound Care Documentation and Therapy:  Incision 10/05/13 Other (Comment) Left (Active)   Number of days: 2700        Elimination:  Continence:   · Bowel: {YES / YE:61782}  · Bladder: {YES / TJ:98862}  Urinary Catheter: {Urinary Catheter:709966271}   Colostomy/Ileostomy/Ileal Conduit: {YES / WE:93045}       Date of Last BM: ***    Intake/Output Summary (Last 24 hours) at 2021 1643  Last data filed at 2021 8111  Gross per 24 hour   Intake 340 ml   Output  ml   Net -1710 ml     I/O last 3 completed shifts:   In: 340 [P.O.:90; IV Piggyback:250]  Out:  [Urine:]    Safety Concerns:     [facility] Safety Concerns:823312069}    Impairments/Disabilities:      [facility] Impairments/Disabilities:934193591}    Nutrition Therapy:  Current Nutrition Therapy:   [facility] Diet List:671304305}    Routes of Feeding: {CHP DME Other Feedings:344180160}  Liquids: {Slp liquid thickness:40347}  Daily Fluid Restriction: {CHP DME Yes amt example:148685975}  Last Modified Barium Swallow with Video (Video Swallowing Test): {Done Not Done NUVQ:103670170}    Treatments at the Time of Hospital Discharge:   Respiratory Treatments: ***  Oxygen Therapy:  {Therapy; copd oxygen:64286}  Ventilator:    { CC Vent MTXJ:984426165}    Rehab Therapies: {THERAPEUTIC INTERVENTION:4001729509}  Weight Bearing Status/Restrictions: [facility] Weight Bearin}  Other Medical Equipment (for information only, NOT a DME order):  {EQUIPMENT:773591248}  Other Treatments: ***    Patient's personal belongings (please select all that are sent with patient):  {[facility] DME Belongings:032230162}    RN SIGNATURE:  {Esignature:081375490}    CASE MANAGEMENT/SOCIAL WORK SECTION    Inpatient Status Date: ***    Readmission Risk Assessment Score:  Readmission Risk              Risk of Unplanned Readmission:        23           Discharging to Facility/ Agency   · Name:   · Address:  · Phone:  · Fax:    Dialysis Facility (if applicable)   · Name:  · Address:  · Dialysis Schedule:  · Phone:  · Fax:    / signature: {Esignature:217822444}    PHYSICIAN SECTION    Prognosis: {Prognosis:7632341820}    Condition at Discharge: [facility] Patient Condition:942727372}    Rehab Potential (if transferring to Rehab): {Prognosis:2731582529}    Recommended Labs or Other Treatments After Discharge: ***    Physician Certification: I certify the above information and transfer of Amber Avelar  is necessary for the continuing treatment of the diagnosis listed and that she requires {Admit to Appropriate Level of Care:52786} for {GREATER/LESS:215228609} 30 days.      Update Admission H&P: {CHP DME Changes in YSZOS:147948952}    PHYSICIAN SIGNATURE:  {Esignature:449640610}

## 2021-02-25 NOTE — PLAN OF CARE
C/o pain and burning in both feet. Oxycodone given at this time. Will continue to monitor for pain.       Problem: Pain:  Goal: Pain level will decrease  Description: Pain level will decrease  Outcome: Ongoing  Goal: Control of chronic pain  Description: Control of chronic pain  Outcome: Ongoing

## 2021-02-25 NOTE — PROGRESS NOTES
Uneventful shift. VSS, no SOB. Pt had anxiety today, prn Ativan given and helped.  Crackles in lung bases.  IV lasix BID given. Repeat chest xray completed. Pt did understands CHF and limited fluids to 240ml with each meal and a few ice chips between meals.

## 2021-02-25 NOTE — PROGRESS NOTES
The Hercules Sleepiness Scale       The Hercules Sleepiness Scale is widely used in the field of sleep medicine as a subjective measure of a patient's sleepiness. The test is a list of eight situations in which you rate your tendency to become sleepy on a scale of 0, no chance to 3, high chance of dozing. Your score is based on a scale of 0 to 24. The scale estimates whether you are experiencing excessive sleepiness that possibly requires medical attention. How Sleepy Are You? How sleepy are you to doze off or fall asleep in the following situations? You should rate your chances of dozing off, not just feeling tired. Even if you have not done some of these things recently try to determine how they would have affected you.  For each situation, decide whether or not you would have:     0 = No chance of dozing 1 = Slight chance of dozing   2 = Moderate chance of  dozing 3 = High change of dozing       Situation                                                                                     Chance of Dozing    Sitting and reading  0 =  [x]  1 =    [] 2 =    [] 3 =    []    Watching TV  0 =  []  1 =    [x] 2 =    [] 3 =    []      Sitting inactive in public place (e.g., a theater or a meeting)  0 =  [x]  1 =    [] 2 =    [] 3 =    []    As a passenger in a car for an hour without a break          0  =  [x]  1 =    [] 2 =    [] 3 =    []    Lying down to rest in the afternoon when circumstances permit    0 =  []  1 =    [] 2 =    [x] 3 =    []    Sitting and talking to someone  0 =  [x]  1 =    [] 2 =    [] 3 =    []      Sitting quietly after a lunch without alcohol  0 =  [x]  1 =    [] 2 =    [] 3 =    []    In a car, while stopped for a few minutes in traffic                                                                      0 =  [x]  1 =    [] 2 =    [] 3 =    []    Total Score = 3    If your total score is 10 or greater, you are experiencing excessive sleepiness and should consider seeking a medical follow-up. Take a copy of this screening test to your primary care physician on your next office visit. Interpretation:      0 -   7: It is unlikely that you are abnormally sleepy. 8 -   9: You have an average amount of daytime sleepiness. 10 - 15: You may be excessively sleepy depending on the situation. You may want to consider seeking medical attention. 16 - 24: You are excessively sleepy and should consider seeking medical attention.       Electronically signed by Boubacar Angelo RCP on 2/25/2021 at 4:37 PM

## 2021-02-26 VITALS
RESPIRATION RATE: 16 BRPM | HEIGHT: 67 IN | HEART RATE: 81 BPM | TEMPERATURE: 97.5 F | BODY MASS INDEX: 37.23 KG/M2 | WEIGHT: 237.2 LBS | OXYGEN SATURATION: 98 % | DIASTOLIC BLOOD PRESSURE: 86 MMHG | SYSTOLIC BLOOD PRESSURE: 133 MMHG

## 2021-02-26 LAB
A/G RATIO: 0.7 (ref 1.1–2.2)
ALBUMIN SERPL-MCNC: 2.6 G/DL (ref 3.4–5)
ALP BLD-CCNC: 168 U/L (ref 40–129)
ALT SERPL-CCNC: 12 U/L (ref 10–40)
ANION GAP SERPL CALCULATED.3IONS-SCNC: 15 MMOL/L (ref 3–16)
AST SERPL-CCNC: 17 U/L (ref 15–37)
BILIRUB SERPL-MCNC: <0.2 MG/DL (ref 0–1)
BUN BLDV-MCNC: 49 MG/DL (ref 7–20)
CALCIUM SERPL-MCNC: 8.4 MG/DL (ref 8.3–10.6)
CHLORIDE BLD-SCNC: 105 MMOL/L (ref 99–110)
CO2: 18 MMOL/L (ref 21–32)
CREAT SERPL-MCNC: 2.5 MG/DL (ref 0.6–1.1)
GFR AFRICAN AMERICAN: 25
GFR NON-AFRICAN AMERICAN: 21
GLOBULIN: 3.6 G/DL
GLUCOSE BLD-MCNC: 133 MG/DL (ref 70–99)
GLUCOSE BLD-MCNC: 138 MG/DL (ref 70–99)
GLUCOSE BLD-MCNC: 153 MG/DL (ref 70–99)
HCT VFR BLD CALC: 25.8 % (ref 36–48)
HEMOGLOBIN: 8.2 G/DL (ref 12–16)
MAGNESIUM: 1.9 MG/DL (ref 1.8–2.4)
MCH RBC QN AUTO: 27.8 PG (ref 26–34)
MCHC RBC AUTO-ENTMCNC: 31.8 G/DL (ref 31–36)
MCV RBC AUTO: 87.4 FL (ref 80–100)
PDW BLD-RTO: 16.1 % (ref 12.4–15.4)
PERFORMED ON: ABNORMAL
PERFORMED ON: ABNORMAL
PHOSPHORUS: 6 MG/DL (ref 2.5–4.9)
PLATELET # BLD: 294 K/UL (ref 135–450)
PMV BLD AUTO: 9.3 FL (ref 5–10.5)
POTASSIUM SERPL-SCNC: 4.1 MMOL/L (ref 3.5–5.1)
RBC # BLD: 2.95 M/UL (ref 4–5.2)
SODIUM BLD-SCNC: 138 MMOL/L (ref 136–145)
TOTAL PROTEIN: 6.2 G/DL (ref 6.4–8.2)
WBC # BLD: 13.5 K/UL (ref 4–11)

## 2021-02-26 PROCEDURE — 6370000000 HC RX 637 (ALT 250 FOR IP): Performed by: INTERNAL MEDICINE

## 2021-02-26 PROCEDURE — 97535 SELF CARE MNGMENT TRAINING: CPT

## 2021-02-26 PROCEDURE — 97110 THERAPEUTIC EXERCISES: CPT

## 2021-02-26 PROCEDURE — 36415 COLL VENOUS BLD VENIPUNCTURE: CPT

## 2021-02-26 PROCEDURE — 80053 COMPREHEN METABOLIC PANEL: CPT

## 2021-02-26 PROCEDURE — 84100 ASSAY OF PHOSPHORUS: CPT

## 2021-02-26 PROCEDURE — 83735 ASSAY OF MAGNESIUM: CPT

## 2021-02-26 PROCEDURE — 97112 NEUROMUSCULAR REEDUCATION: CPT

## 2021-02-26 PROCEDURE — 97116 GAIT TRAINING THERAPY: CPT

## 2021-02-26 PROCEDURE — 6370000000 HC RX 637 (ALT 250 FOR IP): Performed by: FAMILY MEDICINE

## 2021-02-26 PROCEDURE — 85027 COMPLETE CBC AUTOMATED: CPT

## 2021-02-26 PROCEDURE — 97530 THERAPEUTIC ACTIVITIES: CPT

## 2021-02-26 PROCEDURE — 2580000003 HC RX 258: Performed by: INTERNAL MEDICINE

## 2021-02-26 PROCEDURE — 6360000002 HC RX W HCPCS: Performed by: INTERNAL MEDICINE

## 2021-02-26 RX ORDER — FUROSEMIDE 40 MG/1
80 TABLET ORAL DAILY
Status: DISCONTINUED | OUTPATIENT
Start: 2021-02-27 | End: 2021-02-26 | Stop reason: HOSPADM

## 2021-02-26 RX ORDER — FUROSEMIDE 40 MG/1
40 TABLET ORAL EVERY EVENING
Status: DISCONTINUED | OUTPATIENT
Start: 2021-02-26 | End: 2021-02-26 | Stop reason: HOSPADM

## 2021-02-26 RX ORDER — NICOTINE 21 MG/24HR
1 PATCH, TRANSDERMAL 24 HOURS TRANSDERMAL DAILY
Qty: 30 PATCH | Refills: 2 | Status: ON HOLD | OUTPATIENT
Start: 2021-02-27 | End: 2021-08-29

## 2021-02-26 RX ORDER — NICOTINE 21 MG/24HR
1 PATCH, TRANSDERMAL 24 HOURS TRANSDERMAL DAILY
Qty: 30 PATCH | Refills: 3 | Status: SHIPPED | OUTPATIENT
Start: 2021-02-27 | End: 2021-02-26

## 2021-02-26 RX ORDER — AMLODIPINE BESYLATE 10 MG/1
10 TABLET ORAL DAILY
Qty: 30 TABLET | Refills: 1 | Status: SHIPPED | OUTPATIENT
Start: 2021-02-27 | End: 2021-07-08 | Stop reason: SDUPTHER

## 2021-02-26 RX ORDER — FUROSEMIDE 40 MG/1
40 TABLET ORAL EVERY EVENING
Qty: 30 TABLET | Refills: 1 | Status: SHIPPED | OUTPATIENT
Start: 2021-02-26 | End: 2021-07-08 | Stop reason: SDUPTHER

## 2021-02-26 RX ORDER — FUROSEMIDE 80 MG
80 TABLET ORAL DAILY
Qty: 60 TABLET | Refills: 1 | Status: SHIPPED | OUTPATIENT
Start: 2021-02-27 | End: 2021-07-08 | Stop reason: SDUPTHER

## 2021-02-26 RX ORDER — CARVEDILOL 6.25 MG/1
6.25 TABLET ORAL 2 TIMES DAILY WITH MEALS
Qty: 60 TABLET | Refills: 1 | Status: SHIPPED | OUTPATIENT
Start: 2021-02-26 | End: 2021-07-11

## 2021-02-26 RX ORDER — SPIRONOLACTONE 25 MG/1
25 TABLET ORAL DAILY
Qty: 30 TABLET | Refills: 1 | Status: SHIPPED | OUTPATIENT
Start: 2021-02-27 | End: 2021-07-08 | Stop reason: DRUGHIGH

## 2021-02-26 RX ADMIN — HEPARIN SODIUM 5000 UNITS: 5000 INJECTION INTRAVENOUS; SUBCUTANEOUS at 05:19

## 2021-02-26 RX ADMIN — OXYCODONE 5 MG: 5 TABLET ORAL at 08:33

## 2021-02-26 RX ADMIN — LORAZEPAM 0.5 MG: 0.5 TABLET ORAL at 08:33

## 2021-02-26 RX ADMIN — CARVEDILOL 6.25 MG: 6.25 TABLET, FILM COATED ORAL at 08:33

## 2021-02-26 RX ADMIN — AMLODIPINE BESYLATE 10 MG: 5 TABLET ORAL at 08:33

## 2021-02-26 RX ADMIN — Medication 10 ML: at 08:40

## 2021-02-26 RX ADMIN — INSULIN GLARGINE 16 UNITS: 100 INJECTION, SOLUTION SUBCUTANEOUS at 08:35

## 2021-02-26 RX ADMIN — SERTRALINE 50 MG: 50 TABLET, FILM COATED ORAL at 08:33

## 2021-02-26 RX ADMIN — ASPIRIN 81 MG: 81 TABLET, FILM COATED ORAL at 08:33

## 2021-02-26 RX ADMIN — FUROSEMIDE 40 MG: 40 TABLET ORAL at 08:33

## 2021-02-26 RX ADMIN — SPIRONOLACTONE 25 MG: 25 TABLET ORAL at 08:33

## 2021-02-26 ASSESSMENT — ENCOUNTER SYMPTOMS
FACIAL SWELLING: 0
SHORTNESS OF BREATH: 0
ABDOMINAL DISTENTION: 0
ABDOMINAL PAIN: 0
NAUSEA: 0
CONSTIPATION: 0
EYE REDNESS: 0
VOMITING: 0
PHOTOPHOBIA: 0
CHEST TIGHTNESS: 0
BLOOD IN STOOL: 0
COUGH: 0
DIARRHEA: 0
EYE DISCHARGE: 0

## 2021-02-26 ASSESSMENT — PAIN SCALES - GENERAL: PAINLEVEL_OUTOF10: 8

## 2021-02-26 NOTE — PROGRESS NOTES
Physical Therapy  Facility/Department: [address]  Daily Treatment Note  NAME: Arsenio Wise  : 1975  MRN: [id number]    Date of Service: 2021    Discharge Recommendations:Kristine Sanon 39 scored a 18/24 on the AM-PAC short mobility form. Current research shows that an AM-PAC score of 17 or less is typically not associated with a discharge to the patient's home setting. Based on the patient's AM-PAC score and their current functional mobility deficits, it is recommended that the patient have 5-7 sessions per week of Physical Therapy at d/c to increase the patient's independence. At this time, this patient demonstrates the endurance, and/or tolerance for 3 hours of therapy each day, with a treatment frequency of 5-7x/wk. Please see assessment section for further patient specific details. If patient discharges prior to next session this note will serve as a discharge summary. Please see below for the latest assessment towards goals. Pt states she did not have follow up therapy after her CVA in 2020. Pt has multiple health issues currently causing significant debilitation. Pt has had 8 falls over a 3 month period and is concerned with safely returning home and avoiding future falls. Pt's  works full time and pt is home alone during the day. Pt has also had numerous personal issues in the last year with multiple family members dying from Covid-25. If pt does not discharge to a rehab unit, recommend outpatient PT 2-3x per week, as pt states her  could transport her and pt would prefer outpatient PT to [address] PT Equipment Recommendations  Equipment Needed: Yes(bedrail to assist with bed mobility and positioning in bed)    Assessment   Body structures, Functions, Activity limitations: Decreased functional mobility ; Decreased sensation;Decreased balance;Decreased vision/visual deficit; Decreased strength;Decreased endurance  Assessment: 40 yo female admitted with increased falls, CHF, acute kidney injury, diabetic neuropathy, previous CVA. Pt presents with decreased functional mobility and poor endurance. Pt will benefit from con't skilled PT to facilitate safe return home. Treatment Diagnosis: decreased sensation, transfers, gait, endurance  Prognosis: Good  History: CHF, diabetic peripheral neuropathy, previous CVA  Exam: transfers, gait, endurance  Clinical Presentation: stable  PT Education: Goals; General Safety;Gait Training;PT Role;Plan of Care; Functional Mobility Training;Precautions;Transfer Training  Patient Education: educated pt on role of PT and discharge recommendation - pt verbalizes understanding  Barriers to Learning: anxiety  REQUIRES PT FOLLOW UP: Yes  Activity Tolerance  Activity Tolerance: Patient Tolerated treatment well;Patient limited by fatigue  Activity Tolerance: Pt reported increased fatigue with ambulation     Patient Diagnosis(es): The encounter diagnosis was Stage 3a chronic kidney disease. has a past medical history of Cerebral artery occlusion with cerebral infarction ([facility] [address] CHF (congestive heart failure) ([facility] [address] Chronic kidney disease, Diabetes mellitus out of control ([facility] [address] Diabetes mellitus, type II ([facility] [address] Generalized headaches, Hypertension, Infertility, Insomnia, Migraine headache, Mixed hyperlipidemia, Otitis media, Pelvic abscess in female, and Pneumonia. has a past surgical history that includes Cervix surgery and eye surgery. Restrictions  Restrictions/Precautions  Restrictions/Precautions: Fall Risk(High fall risk)  Required Braces or Orthoses?: No  Position Activity Restriction  Other position/activity restrictions:  This is a pleasant 39 y.o. female with history of chronic diastolic CHF (most recent echocardiogram from August 2020 with ejection fraction of 55%), history of nephrotic syndrome, CKD stage III (with baseline creatinine of 1.3-1.6), history of CVA, uncontrolled diabetes type 2, essential hypertension,

## 2021-02-26 NOTE — PROGRESS NOTES
Assessment complete, see doc flowsheet. Patient resting in bed, call light in reach, bed in lowest position, brake set. Patient denies any needs at this time. Patient encouraged to call if needs arise.   Cecilio De La Torre RN

## 2021-02-26 NOTE — PROGRESS NOTES
Occupational Therapy  Facility/Department: [address]  Daily Treatment Note  NAME: Pamela Muñoz  : 1975  MRN: [id number]    Date of Service: 2021    Discharge Recommendations:      Pamela Muñoz scored a 18/24 on the AM-PAC ADL Inpatient form. Current research shows that an AM-PAC score of 17 or less is typically not associated with a discharge to the patient's home setting. Based on the patient's AM-PAC score and their current ADL deficits, it is recommended that the patient have 5-7 sessions per week of Occupational Therapy at d/c to increase the patient's independence. At this time, this patient demonstrates the endurance, and/or tolerance for 3 hours of therapy each day, with a treatment frequency of 5-7x/wk. Please see assessment section for further patient specific details. If patient discharges prior to next session this note will serve as a discharge summary. Please see below for the latest assessment towards goals. Pt states she did not have follow up therapy after her CVA in 2020. Pt has multiple health issues currently causing significant debilitation. Pt has had 8 falls over a 3 month period and is concerned with safely returning home and avoiding future falls. Pt's  works full time and pt is home alone during the day. Pt has also had numerous personal issues in the last year with multiple family members dying from Covid-25. If pt does not discharge to a rehab unit, recommend outpatient OT 2-3x per week, as pt states her  could transport her and pt would prefer outpatient OT to [facility]. OT Equipment Recommendations  Equipment Needed: No  Other: pt state she has the needed equipment    Assessment   Performance deficits / Impairments: Decreased functional mobility ; Decreased ADL status; Decreased high-level IADLs;Decreased sensation;Decreased endurance;Decreased fine motor control;Decreased cognition;Decreased vision/visual deficit; Decreased safe awareness;Decreased balance;Decreased coordination  Assessment: Pt presents with the above deficits affecting occupational performance. Would benefit from continued skilled OT services to address these deficits to return to PLOF  Treatment Diagnosis: decreased independence with ADL due to late affects of CVA, neuropathy, CHF, diabetic retinopathy , questionable affects of depression due to loss of father, mother, and mother in law in past 6 mox  Prognosis: Good  Decision Making: Medium Complexity  OT Education: OT Role;Plan of Care;ADL Adaptive Strategies; Family Education;IADL Safety;Equipment;Home Exercise Program  Patient Education: Terrie soriano ex, shoulder isometric exercise-pt verbalized and demonstrated understanding  Barriers to Learning: low vision  REQUIRES OT FOLLOW UP: Yes  Activity Tolerance  Activity Tolerance: Patient Tolerated treatment well  Safety Devices  Safety Devices in place: Yes  Type of devices: All fall risk precautions in place;Call light within reach; Patient at risk for falls; Left in bed;Nurse notified;Gait belt(RN aware of no bed alarm, pt up ad yuki in room)         Patient Diagnosis(es): The primary encounter diagnosis was History of CVA (cerebrovascular accident). A diagnosis of Stage 3a chronic kidney disease was also pertinent to this visit. has a past medical history of Cerebral artery occlusion with cerebral infarction ([address] CHF (congestive heart failure) ([address] Chronic kidney disease, Diabetes mellitus out of control ([address] Diabetes mellitus, type II ([address] Generalized headaches, Hypertension, Infertility, Insomnia, Migraine headache, Mixed hyperlipidemia, Otitis media, Pelvic abscess in female, and Pneumonia. has a past surgical history that includes Cervix surgery and eye surgery.     Restrictions  Restrictions/Precautions  Restrictions/Precautions: Fall Risk(HIGH FALL RISK)  Required Braces or Orthoses?: No  Position Activity Restriction  Other position/activity restrictions: This is a pleasant 39 y.o. female with history of chronic diastolic CHF (most recent echocardiogram from August 2020 with ejection fraction of 55%), history of nephrotic syndrome, CKD stage III (with baseline creatinine of 1.3-1.6), history of CVA, uncontrolled diabetes type 2, essential hypertension, hyperlipidemia, obesity with BMI of 37 kg/m², chronic nonzero troponin elevation, who presents to the emergency room with complaints of shortness of breath, orthopnea, about 38 pound weight gain over the last 2 days. She also reports nonproductive cough that has been present for 2 days. She reports subjective fever yesterday. She does not have chills. Blood pressure was elevated in ER. Subjective   General  Chart Reviewed: Yes  Patient assessed for rehabilitation services?: Yes  Response to previous treatment: Patient with no complaints from previous session  Family / Caregiver Present: No  Diagnosis: pneumonia, CHF  Subjective  Subjective: Pt packing belongs in stance upon entry, up ad yuki in room. RN aware. Pleasant and agreeable to therapy session. Reporting some numbness/tingling in R UE.       Orientation  Orientation  Overall Orientation Status: Within Normal Limits  Objective    ADL  UE Dressing: Setup;Supervision(doff gown, mendel sweatshirt)  LE Dressing: Setup;Stand by assistance(pt demo'd ability to doff/mendel B socks, thread B LEs into sweatpants and perform LB clothing management over hips once in stance)  Toileting: None  Additional Comments: ADLs addressed seated EOB        Balance  Sitting Balance: Supervision  Standing Balance: Stand by assistance  Standing Balance  Time: ~10 minutes  Activity: functional mobility in room, stance for ther ex/weightbearing techniques in stance  Comment: no device, no LOB  Functional Mobility  Functional - Mobility Device: No device  Activity: Other  Assist Level: Stand by assistance  Functional Mobility Comments: ~20' in room  Bed mobility  Bridging: Contact guard assistance(towards HOB)  Supine to Sit: Modified independent  Sit to Supine: Modified independent  Scooting: Modified independent  Transfers  Sit to stand: Supervision  Stand to sit: Supervision     Neuromuscular Education  Neuromuscular education: Yes  NDT Treatment: Upper extremity; Sitting;Standing  Weight Bearing  Weight Bearing Technique: Yes  RUE Weight Bearing: Extended arm standing; Extended arm seated;Forearm seated     Cognition  Overall Cognitive Status: WFL     Perception  Overall Perceptual Status: WFL           Upper Extremity Function  NDT Treatment: Upper extremity; Sitting;Standing     Pt provided with handout on shoulder isometric exercise, shoulder flexion/extension, internal/external rotation, abduction/adduction) Pt verbalized understanding. Pt in stance performed R UE weightbearing techniques along wall for proprioceptive feedback, wall pushup ~1 minute, shoulder flexion weight bearing through towel with affected R UE/ ~ 1 minute, ~1 minute shoulder horizontal abduction. Pt performed self range of motion AAROM, shoulder flexion/extension x15 repetitions, shoulder abduction/adduction x15 repetitions, shoulder internal/external rotation x15 repetitions, elbow flexion/extension x15 repetitions, wrist flexion/extension x15 repetitions.      Plan   Plan  Times per week: 3-5  Times per day: Daily  Current Treatment Recommendations: Balance Training, Functional Mobility Training, Safety Education & Training, Self-Care / ADL, Equipment Evaluation, Education, & procurement, Neuromuscular Re-education, Endurance Training, Patient/Caregiver Education & Training       AM-PAC Score        AM-[facility] Inpatient Daily Activity Raw Score: 18 (02/26/21 1438)  AM-PAC Inpatient ADL T-Scale Score : 38.66 (02/26/21 1438)  ADL Inpatient CMS 0-100% Score: 46.65 (02/26/21 1438)  ADL Inpatient CMS G-Code Modifier : CK (02/26/21 1438)    Goals  Short term goals  Short term goal 1: increase

## 2021-02-26 NOTE — PROGRESS NOTES
[facility] Note    Patient Active Problem List   Diagnosis    Type 2 diabetes mellitus, with long-term current use of insulin ([address]    Mixed hyperlipidemia    Migraine headache    Anemia    Diabetic foot infection ([address]    Pyogenic inflammation of bone (HCC)    History of migraine    History of medication noncompliance    Overweight (BMI 25.0-29. 9)    Osteomyelitis of left foot (HCC)    Nephrotic syndrome    Peripheral edema    Pulmonary edema    Right sided numbness    Tobacco dependence    Chronic kidney disease (CKD), stage III (moderate)    Chronic diastolic (congestive) heart failure (HCC)    History of CVA (cerebrovascular accident)    Arterial ischemic stroke, ICA, left, acute ([address]    HTN (hypertension), benign    DM (diabetes mellitus), secondary, uncontrolled, w/neurologic complic ([address]    Dyslipidemia    Smoker    Panic disorder without agoraphobia    Isolated proteinuria    Acute on chronic diastolic heart failure ([address]    Diabetic peripheral neuropathy ([address]       Past Medical History:   has a past medical history of Cerebral artery occlusion with cerebral infarction ([address] CHF (congestive heart failure) ([address] Chronic kidney disease, Diabetes mellitus out of control ([address] Diabetes mellitus, type II ([address] Generalized headaches, Hypertension, Infertility, Insomnia, Migraine headache, Mixed hyperlipidemia, Otitis media, Pelvic abscess in female, and Pneumonia. Past Social History:   reports that she has been smoking cigarettes. She has been smoking about 1.00 pack per day. She has never used smokeless tobacco. She reports that she does not drink alcohol or use drugs. Subjective:   Feels like she is still swollen. No SOB. -5.9L since admission. Review of Systems   Constitutional: Negative for activity change, appetite change, chills, fatigue, fever and unexpected weight change.    HENT: Negative for congestion and facial swelling. Eyes: Negative for photophobia, discharge and redness. Respiratory: Negative for cough, chest tightness and shortness of breath. Cardiovascular: Positive for leg swelling. Negative for chest pain and palpitations. Gastrointestinal: Negative for abdominal distention, abdominal pain, blood in stool, constipation, diarrhea, nausea and vomiting. Endocrine: Negative for cold intolerance, heat intolerance and polyuria. Genitourinary: Negative for decreased urine volume, difficulty urinating, flank pain and hematuria. Musculoskeletal: Negative for joint swelling and neck pain. Neurological: Negative for dizziness, seizures, syncope, speech difficulty, light-headedness and headaches. Hematological: Does not bruise/bleed easily. Psychiatric/Behavioral: Negative for agitation, confusion and hallucinations. Objective:      BP (!) 167/82   Pulse 82   Temp 97.5 °F (36.4 °C) (Oral)   Resp 16   Ht 5' 7\" (1.702 m)   Wt 237 lb 3.2 oz (107.6 kg)   LMP 02/23/2021   SpO2 97%   BMI 37.15 kg/m²     Wt Readings from Last 3 Encounters:   02/26/21 237 lb 3.2 oz (107.6 kg)   08/31/20 200 lb (90.7 kg)   05/13/20 188 lb (85.3 kg)       BP Readings from Last 3 Encounters:   02/26/21 (!) 167/82   08/31/20 137/83   05/13/20 132/69     Chest- clear  Heart-regular  Abd-soft  Ext- 2+ edema, decreased    Labs  Hemoglobin   Date Value Ref Range Status   02/26/2021 8.2 (L) 12.0 - 16.0 g/dL Final     Hematocrit   Date Value Ref Range Status   02/26/2021 25.8 (L) 36.0 - 48.0 % Final     WBC   Date Value Ref Range Status   02/26/2021 13.5 (H) 4.0 - 11.0 K/uL Final     Platelets   Date Value Ref Range Status   02/26/2021 294 135 - 450 K/uL Final     Lab Results   Component Value Date    CREATININE 2.5 (H) 02/26/2021    BUN 49 (H) 02/26/2021     02/26/2021    K 4.1 02/26/2021     02/26/2021    CO2 18 (L) 02/26/2021       Assessment/Plan:  1.   KINZA on CKD III, baseline creatinine 1.3 to 1.6. The patient  currently appears hypervolemic. Probably due to decreased renal  perfusion in the setting of pneumonia plus ACE inhibitor use plus  possible venous congestion. Possible ATN. Stable with diuresis. Increase Lasix to 80mg am and 40mg pm.  Continue Spironolactone 25mg daily. 2.  Hypokalemia, replaced. 3.  History of nephrotic range proteinuria probably from diabetes  mellitus. Probably has hyperactive RAAS at this time, which may have  explained sudden weight gain. Continue SPLT. Hold ACEI/ARB for now. 4.  Hypertension. Continue diuresis. Follow as outpt. 5.  Weight gain plus lower extremity edema. Better. 6.  History of diabetes mellitus. Management as per Medicine. 7.  Dispo-okay for D/C from renal perspective and f/u with me in 1-2 weeks.      Isaak Rosario MD

## 2021-02-26 NOTE — PROGRESS NOTES
CLINICAL PHARMACY NOTE: MEDS TO [address] Select Patient?: No  Total # of Prescriptions Filled: 7   The following medications were delivered to the patient:  · Carvedilol 6.25mg  · lantus solostar   · Spironolactone 25mg  · Nicotine 21mg patch  · Furosemide 40mg  · Furosemide 80mg  · Pen needles  Total # of Interventions Completed: 0  Time Spent (min): 30    Additional Documentation:  Medications delivered- patient signed  Joselyn Malik

## 2021-02-26 NOTE — PROGRESS NOTES
Pt feeling somewhat less edematous in her abdomen and breasts but same in lower extremities. Hopes to go home tomorrow.

## 2021-02-27 LAB
BLOOD CULTURE, ROUTINE: NORMAL
CULTURE, BLOOD 2: NORMAL

## 2021-02-28 NOTE — DISCHARGE SUMMARY
concerning for pneumonia. She was admitted and treated as above with marked improvement in symptoms prior to discharge. Physical Exam:  /86   Pulse 81   Temp 97.5 °F (36.4 °C) (Oral)   Resp 16   Ht 5' 7\" (1.702 m)   Wt 237 lb 3.2 oz (107.6 kg)   LMP 02/23/2021   SpO2 98%   BMI 37.15 kg/m²   General appearance: alert, appears stated age and cooperative  Head: Normocephalic, without obvious abnormality, atraumatic  Eyes: conjunctivae/corneas clear. PERRL, EOM's intact. Ears: External ears -normal  Nose: No drainage or sinus tenderness.   Throat: lips, mucosa, and tongue normal; teeth and gums normal  Neck: no adenopathy, no carotid bruit, no JVD, supple, symmetrical, trachea midline and thyroid not enlarged, symmetric, no tenderness/mass/nodules  Lungs: clear to auscultation bilaterally  Heart: regular rate and rhythm, S1, S2 normal, no murmur,   Abdomen: soft, non-tender; bowel sounds normal; no masses,  no organomegaly  Extremities: extremities normal, atraumatic, no cyanosis or edema  Neurologic: Grossly normal    Consults: nephrology  Significant Diagnostic Studies:  chest x-ray  Treatments: antibiotics  Disposition: home  Discharged Condition: Stable  Follow Up: Primary Care Physician in one week  Discharge Medications:   German Villasenor   Home Medication Instructions BRANDI:[id number]    Printed on:02/28/21 2978   Medication Information                      amLODIPine (NORVASC) 10 MG tablet  Take 1 tablet by mouth daily             aspirin 81 MG EC tablet  Take 1 tablet by mouth daily             atorvastatin (LIPITOR) 40 MG tablet  Take 1 tablet by mouth nightly             carvedilol (COREG) 6.25 MG tablet  Take 1 tablet by mouth 2 times daily (with meals)             furosemide (LASIX) 40 MG tablet  Take 1 tablet by mouth every evening             furosemide (LASIX) 80 MG tablet  Take 1 tablet by mouth daily             glucose blood VI test strips (VICTORY AGM-4000 TEST) strip  Test four times daily until controlled and then three times daily. Code 250.02  ONE TOUCH ULTRA MONITOR             glucose monitoring kit (FREESTYLE) monitoring kit  1 kit by Does not apply route daily             insulin glargine (LANTUS;BASAGLAR) 100 UNIT/ML injection pen  Inject 16 Units into the skin daily             insulin lispro, 1 Unit Dial, 100 UNIT/ML SOPN  Inject 0-6 Units into the skin 3 times daily (with meals) **Corrective Low Dose Algorithm**  Glucose: Dose:               No Insulin  140-199 1 Unit  200-249 2 Units  250-299 3 Units  300-349 4 Units  350-399 5 Units  Over 399 6 Units             LORazepam (ATIVAN) 0.5 MG tablet  Take 0.5 mg by mouth 2 times daily as needed for Anxiety. nicotine (NICODERM CQ) 21 MG/24HR  Place 1 patch onto the skin daily             pregabalin (LYRICA) 75 MG capsule  Take 1 capsule by mouth nightly for 7 days.              sertraline (ZOLOFT) 50 MG tablet  Take 1 tablet by mouth daily             spironolactone (ALDACTONE) 25 MG tablet  Take 1 tablet by mouth daily                Activity: activity as tolerated  Diet: renal dietWound Care: none needed  Time Spent on discharge is more than 30 minutes discussing plan of care and discharge medications with patient and nursing staff    Signed:  MD TOÑITO  Hospitalist Service

## 2021-03-01 ENCOUNTER — TELEPHONE (OUTPATIENT)
Dept: OTHER | Age: 46
End: 2021-03-01

## 2021-03-01 NOTE — TELEPHONE ENCOUNTER
[facility] FAILURE PROGRAM  TELEPHONE ENCOUNTER FORM    Debbie Dk 1975    ASSESSMENT:   1. Baseline weight: 237 lbs  2. Current weight: Patient not weighing self   3. Patient weighing daily: No: just hasn't felt like weighing self  4. Symptoms: still with some edema  5. Patient knows who to call with symptoms: Yes  6. Diet history:      Patient states sodium limitation is : 3000 mg      Patient states fluid limitation is 64 oz  Patient following diet as instructed: Yes  7. Medication history: taking medications as instructed Yes; medication side effects noted No  8. Patient is involved in exercise program: No  9. Patient knows date and time of follow up appointment: Yes  10. Patient has transportation to appointments: Yes    RECOMMENDATIONS:   1. Medication: picked up and taking  2. Diet: no added salt diet  3. MD/ Clinic appointment: 3/3 with PCP  4. Other:  Patient still has to call Dr. Chucky Coreas for follow up visit. Stressed importance of daily weights. Encouraged her to call with questions or concerns.

## 2021-03-08 ENCOUNTER — HOSPITAL ENCOUNTER (OUTPATIENT)
Dept: OCCUPATIONAL THERAPY | Age: 46
Setting detail: THERAPIES SERIES
Discharge: HOME | End: 2021-03-08
Payer: COMMERCIAL

## 2021-03-08 ENCOUNTER — HOSPITAL ENCOUNTER (OUTPATIENT)
Dept: PHYSICAL THERAPY | Age: 46
Setting detail: THERAPIES SERIES
Discharge: HOME | End: 2021-03-08
Payer: COMMERCIAL

## 2021-03-08 PROCEDURE — 97530 THERAPEUTIC ACTIVITIES: CPT

## 2021-03-08 PROCEDURE — 97163 PT EVAL HIGH COMPLEX 45 MIN: CPT

## 2021-03-08 PROCEDURE — 97166 OT EVAL MOD COMPLEX 45 MIN: CPT

## 2021-03-08 NOTE — PLAN OF CARE
[place], [facility], [address]  Phone: [phone]   Fax: [phone]     Physical Therapy Certification    Dear Referring Practitioner: Michaela Weathers MD,    We had the pleasure of evaluating the following patient for physical therapy services at [address] [place] [address]. A summary of our findings can be found in the initial assessment below. This includes our plan of care. If you have any questions or concerns regarding these findings, please do not hesitate to contact me at the office phone number checked above. Thank you for the referral.       Physician Signature:_______________________________Date:__________________  By signing above (or electronic signature), therapists plan is approved by physician      Patient: Getachew Valverde   : 1975   MRN: [id number]  Referring Physician: Referring Practitioner: Michaela Weathers MD      Evaluation Date: 3/8/2021      Medical Diagnosis Information:  Diagnosis: Z86.73 (ICD-10-CM) - History of CVA (cerebrovascular accident)   Treatment Diagnosis: Decreased stamina and BLE strength, Decreased B hamstring length, Impaired balance & gait                                         Insurance information: PT Insurance Information: [facility]. 30 PT/OT/SLP visits pcy PA after 21      Precautions/ Contra-indications: allergic to tape and several allergies to medications  Latex Allergy:  [x]NO      []YES  Preferred Language for Healthcare:   [x]English       []other:    Relevant Medical History:  Diabetic retinopathy & neuropathy, CHF, HTN, CKD, headaches, smokes a pack of cigarettes a day    C-SSRS Triggered by Intake questionnaire (Past 2 wk assessment ):   [] No, Questionnaire did not trigger screening. [x] Yes, Patient intake triggered C-SSRS Screening     [x] Completed, no further action required.    [] Completed, PCP notified via Epic    SUBJECTIVE:   Patient reports CVA effecting R side of body, is L handed, in Aug 2020, most recently had CHF exacerbation middle of Feb '21. States that she doesn't feel like she has CHF, has had 3 episodes of CHF and all come about at times of significant events in her life. Mother past away last month and had exacerbation a few days later. States that she has lost 4 significant family members in the last 6 months which has effected her mentality, step mother past away in Aug and she had her CVA shortly afterwards. Reports developing diabetic retinopathy over the last year and a half affecting her vision, has had surgeries with minimal impact. Has pain in her lower back when standing and moving around in kitchen or if sitting in office chair too long. Uses RW and has been since November of 2019. States that all objects have to remain where the are located in her home so that she doesn't walk into them. Admits to furniture walking. Is interested in perform aquatic PT but doesn't have consistent transportation and wants to limit treatment to 1x weekly at this time, if transportation improves, will increase freq to recommended 2x weekly. Pain Scale: 1-7/10  Easing factors: sitting still   Provocative factors: standing & moving around in kitchen  Type: []Constant   [x]Intermittent  []Radiating []Localized []other:  Numbness/Tingling: B feet    Occupation/School/Hobbies: reading, video games, walks, swimming, spending time with friends    Living Status/Prior Level of Function: Prior to this injury / incident, pt was independent with ADLs and IADLs, Was working full time at  as recently as November 2019, but eyesight began going bad then and lost her job, significant decline in health over the next year.     Domicile:  []Single level house  []Multi-level house []Trailer/Mobile home        [x]Condo/Town home  []Apartment   []Other:          []Steps to enter home:    w/ []HR []No HR; [x]Level []Ramped entry/exit ; []Elevator Strength           Balance/Special Tests: Result N/A Comments   SLS       Feet Together      Romberg         Tandem Stance      QOLIBRI      TUG Score      10-meter Walk      Modified Tami      Mini-BESTest      Dynamic Gait Index      Tinetti Assessment      CHERRY Balance Assessment            Cognitive Function:   []Normal []Impulsive []Flat-affect []Other:      Visual Deficit:  []None []Hemianopsia []Homonymous []Blindness [x]Other: diabetic retinopathy B    Speech:  [x]Normal []Dysphasic  []Aphasic [x]Dysarthria  []Low-volume    Preferred method of feedback:   []Immediate  [x]After task      Quality of Movement:     Coordination:  Rapid Alternation Movement:    Finger to Nose:   Heel to Antonioe Saas      []Normal     [x]Normal    [x]Normal     [x]Dysdiadokinesia   []Dysmetric   []Ataxic              []Abnormal    Tone:   RUE:  [x]Normotonic  []Hypertonic []Hypotonic []Hyperreflexive []Hyporeflexive []Clonus  LUE:  [x]Normotonic  []Hypertonic []Hypotonic []Hyperreflexive []Hyporeflexive []Clonus  RLE:  [x]Normotonic  []Hypertonic []Hypotonic []Hyperreflexive []Hyporeflexive []Clonus  LLE:   [x]Normotonic  []Hypertonic []Hypotonic []Hyperreflexive []Hyporeflexive []Clonus    []R []L UE Synergy: []Flexion  []Extensor  [x]Other: none   []R []L LE Synergy: []Flexion  []Extensor  [x]Other:  none      [x] Patient history, allergies, meds reviewed. Medical chart reviewed. See intake form. Review Of Systems (ROS):  [x]Performed Review of systems (Integumentary, CardioPulmonary, Neurological) by intake and observation. Intake form has been scanned into medical record. Patient has been instructed to contact their primary care physician regarding ROS issues if not already being addressed at this time.       Co-morbidities/Complexities (which will affect course of rehabilitation):    []None        [x]Hx of COVID   Arthritic conditions   []Rheumatoid arthritis (M05.9)  []Osteoarthritis (M19.91)  []Gout   Cardiovascular conditions   [x]Hypertension (I10)  [x]Hyperlipidemia (E78.5)  []Angina pectoris (I20)  []Atherosclerosis (I70)  []Pacemaker  []Hx of CABG/stent/  cardiac surgeries   Musculoskeletal conditions   []Disc pathology   []Congenital spine pathologies   []Osteoporosis (M81.8)  []Osteopenia (M85.8)  []Scoliosis       Endocrine conditions   []Hypothyroid (E03.9)  []Hyperthyroid Gastrointestinal conditions   []Constipation (T42.84)   Metabolic conditions   []Morbid obesity (E66.01)  [x]Diabetes type 1(E10.65) or 2 (E11.65)   [x]Neuropathy (G60.9)     Cardio/Pulmonary conditions   []Asthma (J45)  []Coughing   []COPD (J44.9)  [x]CHF  []A-fib   Psychological Disorders  [x]Anxiety (F41.9)  [x]Depression (F32.9)   []Other:   Developmental Disorders  []Autism (F84.0)  []CP (G80)  []Down Syndrome (Q90.9)  []Developmental delay     Neurological conditions  [x]Prior Stroke (I69.30)  []Parkinson's (G20)  []Encephalopathy (G93.40)  []MS (G35)  []Post-polio (G14)  []SCI  []TBI  []ALS Other conditions  []Fibromyalgia (M79.7)  []Vertigo  []Syncope  [x]Kidney disease   []Cancer      []currently undergoing                treatment  []Pregnancy  []Incontinence   Prior surgeries  []involved limb  []previous spinal surgery  [] section birth  []hysterectomy  []bowel / bladder surgery  []other relevant surgeries   []Other:                Barriers to/and or personal factors that will affect rehab potential:              [x]Age  []Sex    [x]Smoker              []Motivation/Lack of Motivation                        [x]Co-Morbidities              []Cognitive Function, education/learning barriers              []Environmental, home barriers              []profession/work barriers  []past PT/medical experience  []other:    Falls Risk Assessment (30 days):    [] Falls Risk assessed and no intervention required.   [x] Falls Risk assessed and Patient requires intervention due to being higher risk   TUG score (>12s at risk):     [x] Falls education periods/distances/surfaces   [x]Reduced ability to ascend/descend stairs   []other:       Participation Restrictions   []Reduced participation in self care activities   [x]Reduced participation in home management activities   [x]Reduced participation in work activities   [x]Reduced participation in social activities. []Reduced participation in sport/recreational activities.     Classification:   []Signs/symptoms consistent with Primary prevention/risk reduction for loss of balance and falls    []Signs/symptoms consistent with Impaired neuromotor development   [x]Signs/symptoms consistent with Impaired motor function and sensory integrity associated w/non-progressive disorders of CNS - Congenital/Aquired in Infancy/Childhood or Acquired in Adolescence/Adulthood   []Signs/symptoms consistent with Impaired motor function and sensory integrity associate w/progressive disorders of the CNS     []Signs/symptoms consistent with Impaired motor function and sensory integrity associated w/acute or chronic polyneuropathies   []Signs/symptoms consistent with Impaired motor function, peripheral nerve integrity, and sendory integrity associated w/non-progressive disorders of spinal cord   []other:      Prognosis/Rehab Potential:   Tolerance of evaluation/treatment:    []Excellent     []Excellent   []Good     []Good   [x]Fair      [x]Fair   []Poor      []Poor      Physical Therapy Evaluation Complexity Justification  [x] A history of present problem with:  [] no personal factors and/or comorbidities that impact the plan of care;  []1-2 personal factors and/or comorbidities that impact the plan of care  [x]3 personal factors and/or comorbidities that impact the plan of care  [x] An examination of body systems using standardized tests and measures addressing any of the following: body structures and functions (impairments), activity limitations, and/or participation restrictions;:  [] a total of 1-2 or more elements   [x] a total of 3 or more elements   [] a total of 4 or more elements   [x] A clinical presentation with:  [] stable and/or uncomplicated characteristics   [x] evolving clinical presentation with changing characteristics  [] unstable and unpredictable characteristics;   [x] Clinical decision making of [] low, [] moderate, [x] high complexity using standardized patient assessment instrument and/or measurable assessment of functional outcome. [] EVAL (LOW) 74548 (typically 15 minutes face-to-face)  [] EVAL (MOD) 07645 (typically 30 minutes face-to-face)  [x] EVAL (HIGH) 04579 (typically 45 minutes face-to-face)  [] RE-EVAL     PLAN: PT to begin focusing on: Activation of scapular and hip musculature, Transfer & Gait safety, Evenly distributed weight-bearing through extremities, Pain Management    Frequency/Duration:  1-2 days per week for 8 Weeks:  Interventions:  [x]  Therapeutic exercise including: strength training, ROM, for LE, Glutes and core   [x]  NMR activation and proprioception for shoulder complex, glutes, UE/LE, and Core   [x]  Manual therapy as indicated for Shoulder & Hip complex, LE and core activation to include: STM, manual cues to facilitate/inhibit muscle groups. [x]  Modalities as needed that may include: thermal agents, E-stim, Biofeedback, US as indicated  [x]  Patient education on joint protection, postural re-education, home/activity modification, progression of HEP. HEP instruction: Written HEP instructions provided and reviewed. Access Code: [email or web address]  URL: [email or web address]  Date: 03/08/2021  Prepared by: Unk Lesches    Exercises  Seated Table Hamstring Stretch - 3 sets - 30 secs hold - 2x daily - 7x weekly  Beginner Bridge - 10 reps - 2x daily - 7x weekly  Walking - 3-5 mins hold - 10x daily - 7x weekly    GOALS:  Patient stated goal: improve balance and gait  [] Progressing: [] Met: [] Not Met: [] Adjusted    Therapist goals for Patient:   Short Term Goals:  To be achieved in: 2 weeks  1. Independent in HEP and progression per patient tolerance, in order to prevent re-injury. [] Progressing: [] Met: [] Not Met: [] Adjusted  2. Patient will have a decrease in pain to facilitate improvement in movement, function, and ADLs as indicated by Functional Deficits. [] Progressing: [] Met: [] Not Met: [] Adjusted    Long Term Goals: To be achieved in: 8 weeks  1. Patient to demonstrate TUG score <20 seconds to demonstrate improved fall risk to assist with reaching prior level of function. [] Progressing: [] Met: [] Not Met: [] Adjusted  2. Patient will demonstrate increased B hamstring length in 90/90 to lacking 25 deg to allow for proper joint functioning as indicated by patients Functional Deficits. [] Progressing: [] Met: [] Not Met: [] Adjusted  3. Patient will demonstrate an increase in BLE strength to at least 4/5 throughout to allow for proper functional mobility as indicated by patients Functional Deficits. [] Progressing: [] Met: [] Not Met: [] Adjusted  4. Patient will return to functional activities including cooking without increased symptoms or restriction. [] Progressing: [] Met: [] Not Met: [] Adjusted  5. Patient to amb w/RW 1000' over even & uneven surfaces IND in order to return to community activities.   [] Progressing: [] Met: [] Not Met: [] Adjusted     Electronically signed by:  Sarah Che, PT

## 2021-03-08 NOTE — PROGRESS NOTES
[address], [address] [facility] [address], [address]  Phone: [phone]   Fax: [phone]                                                     Occupational Therapy Certification    Dear Sara Sauceda  ,    We had the pleasure of evaluating the following patient for occupational therapy services at [facility]. A summary of our findings can be found in the initial assessment below. This includes our plan of care. If you have any questions or concerns regarding these findings, please do not hesitate to contact me at the office phone number checked above. Thank you for the referral.       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   [] Yes, Patient intake triggered C-SSRS Screening     [] Completed, no further action required. [] Completed, PCP notified via [place]  Reason for Seeking OT Services and Patient Goals:  Patient's chief complaint is decreased balance with standing and reaching, has difficulty stopping and starting during ambulation,  Decreased standing tolerance.       Personal Interests and Values:  Lives with , enjoys reading, playing video games, watch [email or web address], writing, swimming, walking,     Occupational History (Life Experiences Including Prior Level of Function): Actively going on disability,   Patient reports she is having difficulty with bathing, dressing, and her  is assisting her with self care. Needs supervision with all transfers due to balance, dressing is difficult due to impaired vision,  Patient reports her vision has been impaired since spring of 2019    Performance Patterns (Routines, Roles, Rituals, & Habits):Patient's  is working      Physical and Social Environments (which aide or inhibit performance in desired occupations): One story home, handicapped accessible shower, ambulates with rw,      Personal, Temporal, and Virtual Contexts (which aide or inhibit performance in desired occupations):decreased vision, 's support decreased balance.  , hx of neuropathy, chronic back pain      SUBJECTIVE: Patient stated complaint: impaired vision and balance interfereing with safety during ADL, patient reports she is cooking a little, endurance and coordination are a problem    Pain Scale: 4/10  Feet and back   Easing factors rest laying down, lyrica  Provocative factors     Type: []Constant   [x]Intermittent  []Radiating []Localized []other:       OBJECTIVE:   Hand dominance: left hand dominant    Inspection:     Palpation:      Bandages/Dressings/Incisions:      ROM WFL: [x]Yes []No (if checked, see below)     PROM AROM    L R L R   Shoulder Flexion        Shoulder Abduction        Shoulder External Rotation        Shoulder Internal Rotation        Elbow Flexion        Elbow Extension        Pronation        Supination        Wrist Flexion        Wrist Extension        Radial Deviation        Ulnar Deviation       CMC Abduction       5th Digit MCP Extension-Flexion       4th Digit MCP Extension-Flexion       3rd Digit MCP Extension-Flexion       2nd Digit MCP Extension-Flexion       1st Digit MCP Extension-Flexion       5th Digit PIP Extension- Flexion       4th Digit PIP Extension-Flexion       3rd Digit PIP Extension-Flexion       2nd Digit PIP Extension-Flexion       1st Digit IP Extension-Flexion       5th Digit DIP Extension-Flexion       4th Digit DIP Extension-Flexion       3rd Digit DIP Extension-Flexion       2nd Digit DIP Extension-Flexion       Composite Flexion (Tip of 3rd Digit to Distal Palmar Crease (cm))         Joint mobility:     [x]Normal    []Hypo   []Hyper    Strength WFL: []Yes []No (if checked, see below)    Strength (0-5) Left Right    Shoulder Flex 4 throughout 4- throughout    Shoulder Abd     Shoulder ER     Shoulder IR     Biceps     Triceps     Pronation      Supination      Wrist Flex     Wrist Ext     Wrist Radial Deviation         Orthopaedic Special Tests Positive  Negative  NT Comments    Shoulder        Empty Can              Elbow        Cozen's       Tinel's               Hand/Wrist       Finkelstein's       Tinel's       Phalen's       Froment's       [place] Sign       [place] Deformity       [place] Neck Deformity       [place]'s Nodes (DIP)       Isreal's Nodes (PIP)       Mallet Finger       Grind Test Marlene Rogers)                      Hand Assessment Left  Right  Comments    9 Hole Peg Test (s)       Strength (lbs)      Lateral Pinch Strength (lbs)      Palmar Pinch Strength (lbs)      Tip Pinch Strength (lbs)      Opposition (Y/N)      Functional Outcome Measures:        see optimal                                  Functional Mobility/Transfers: supervision     Posture: stands in flexion over walker    Synergy/Muscle tone: wnl    Sensation: impaired both hands and feet to proprioception and tactile due to neuropathy    Coordination: slightly slow    Visual Acuity: impaired due to retinopathy both eyes, macular degeneration    Perception: not assessed due to visual impairments    MVPT:      [place] making A:     [place] making B:     Visual field cut:    Cognition wnl                  [x] Patient history, allergies, meds reviewed. Medical chart reviewed. See intake form. Review Of Systems (ROS):  [x]Performed Review of systems (Integumentary, CardioPulmonary, Neurological) by intake and observation. Intake form has been scanned into medical record. Patient has been instructed to contact their primary care physician regarding ROS issues if not already being addressed at this time.       Co-morbidities/Complexities (which will affect course of rehabilitation):   []None        [x]Hx of COVID possible just told to quarantine   Arthritic conditions   []Rheumatoid arthritis (M05.9)  []Osteoarthritis (M19.91)  []Gout   Cardiovascular conditions   [x]Hypertension (I10)  []Hyperlipidemia (E78.5)  []Angina pectoris (I20)  [x]Atherosclerosis (I70)  []Pacemaker  []Hx of CABG/stent/  cardiac surgeries   Musculoskeletal conditions   []Disc pathology   []Congenital spine pathologies   []Osteoporosis (M81.8)  []Osteopenia (M85.8)  []Scoliosis       Endocrine conditions   []Hypothyroid (E03.9)  []Hyperthyroid Gastrointestinal conditions   []Constipation (H07.87)   Metabolic conditions   [x]Morbid obesity (E66.01)  [x]Diabetes type 1(E10.65) or 2 (E11.65)   [x]Neuropathy (G60.9)     Cardio/Pulmonary conditions   []Asthma (J45)  []Coughing   []COPD (J44.9)  [x]CHF  []A-fib   Psychological Disorders  [x]Anxiety (F41.9)  [x]Depression (F32.9)   []Other:   Developmental Disorders  []Autism (F84.0)  []CP (G80)  []Down Syndrome (Q90.9)  []Developmental delay     Neurological conditions  [x]Prior Stroke (I69.30)  []Parkinson's (G20)  []Encephalopathy (G93.40)  []MS (G35)  []Post-polio (G14)  []SCI  []TBI  []ALS Other conditions  []Fibromyalgia (M79.7)  []Vertigo  []Syncope  []Kidney Failure  []Cancer      []currently undergoing                treatment  []Pregnancy  []Incontinence   Prior surgeries  []involved limb  []previous spinal surgery  [] evaluation/treatment:    []Excellent   [x]Good    []Fair   []Poor    Occupational Therapy Evaluation Complexity Justification   [x] A Occupational Profile/Medical and Therapy History  [] no personal factors and/or comorbidities that impact the plan of care; brief history relating to presenting problem  [x]1-2 personal factors and/or comorbidities that impact the plan of care; additional review of physical, cognitive, or psychosocial performance  []3 personal factors and/or comorbidities that impact the plan of care; extensive review of physical cognitive, psychosocial performance  [x] Patient Assessment:  [] a total of 1-3 performance deficits relating to physical, cognitive, psychosocial limitations/restrictions  [x] a total of 3-5 performance deficits relating to physical, cognitive, psychosocial limitations/restrictions  [] a total of 5 or more performance deficits relating to physical, cognitive, psychosocial limitations/restrictions  [x] A clinical presentation with:  [] low complexity, limited amount of treatment options no assessment modification, no co-morbidities  [x] moderate analytical complexity, detailed assessments, minimal to moderate modification of assessments, may have co-morbidities  [] high analytic complexity, comprehensive assessments, multiple treatment options, significant modifications of assessment, co-morbidities affecting performance  [x] Clinical decision making of [] low , [x] moderate, [] high complexity using standardized patient assessment instrument and/or measurable assessment of functional outcome.     [] EVAL (LOW) 01370 (typically 15 minutes face-to-face)  [x] EVAL (MOD) 39517 (typically 30 minutes face-to-face)  [] EVAL (HIGH) 10493 (typically 45 minutes face-to-face)  [] RE-EVAL 56187    PLAN:  Frequency/Duration:     days per week for  Weeks:  INTERVENTIONS:  [x] Strength training, ROM, balance training, functional mobility training  [x] Neuromuscular re-education and positioning  [] Manual therapy including soft tissue mobilization and joint manipululations  [x] Modalities as needed that may include: E-stim, Ultrasound, Fluidotherapy, Iontophoresis as indicated, Paraffin, Hot Packs, Cold Packs  [x] Patient/caregiver education on joint protection, postural re-education, activity modification, progression of HEP. [x] Patient/caregiver education regarding home management and safety as well as ADL and IADL performance  [] Cognitive re-orientation and training  [] Manual therapy including soft tissue mobilization, manual lymph drainage, and joint manipululations  [x] Adaptive Equipment Education and Training  [] Splinting including custom or pre-fabricated    HEP instruction: Written and verbal HEP instructions provided and reviewed:   Reviewed OT eval and plan of care, discussed and educated on therapy options of home care and telehealth since transportation is a barrier. Discussed possible public transportation resources. Patient and OT to continue researching resources since patient prefers in person therapy at this time. GOALS:  Patient stated goal:  Patient's goal is to improve balance and mobility to be independent   [] Progressing: [] Met: [] Not Met: [] Adjusted    Therapist goals for Patient:   Short Term Goals: To be achieved in: 30 days  1. Pt will be modified independent with bathing/ dressing  [] Progressing: [] Met: [] Not Met: [] Adjusted  2. Pt will demonstrate improved balance to be modified independent with home making  [] Progressing: [] Met: [] Not Met: [] Adjusted    Long Term Goals: To be achieved by dishcharge  1.  Pt will will report an optimal improvement of a 10 point raw score  [] Progressing: [] Met: [] Not Met: [] Adjusted  Electronically signed by:  Kenneth Young OT

## 2021-03-09 NOTE — PROGRESS NOTES
[facility] Outpatient [facility]ational [facility]    Occupational Therapy Daily Treatment Note  Date:  3/9/2021    Patient Name:  Jordan Fernandez    :  1975  MRN: [id number]  Medical/Treatment Diagnosis Information:  ·   Left cva  ·   CHF  · neuropathy  Insurance/Certification information: care source     Physician Information:    Monique Condon CNP is primary care            Plan of care signed (Y/N):     Functional scale[de-identified]  optimal                                              RESTRICTIONS/PRECAUTIONS:  Fall risk     Latex Allergy:  [x]NO      []YES  Preferred Language for Healthcare:   [x]English       []other:     Progress Report: []  Yes  [x]  No     Date Range for reporting period:  Beginning:3/8/2021  Endin2021    Progress report due (10 Rx/or 30 days whichever is less): visit #10 or  3290    Recertification due (POC duration/ or 90 days whichever is less): visit #10 or 2021    Visit # Insurance Allowable Auth required? Date Range    []  Yes  []  No 3/8/2021 to 2021       Pain level: 0/10     SUBJECTIVE:  See eval    OBJECTIVE MEASUREMENTS (See eval for initial): Insert Date 3/8/2021     Functional Mobility/Transfers: supervision     Posture: stands in flexion over walker     Synergy/Muscle tone: wnl     Sensation: impaired both hands and feet to proprioception and tactile due to neuropathy     Coordination: slightly slow for alternating hands and finger to nose     Visual Acuity: impaired due to retinopathy both eyes, macular degeneration            Session Description/ Therapeutic Activities: Patient and  report that transportation is difficult due to car having problems and  works full time. Patient is not able to drive due to low vision. Patient,  and OT discussed therapy options including home care, tele health.   Patient prefers to come to out patient therapy and would like to participate in house/yardwork, driving, computer work. NMR and Therapeutic Activities:    [x] (51428 or 13496) Provided verbal/tactile cueing for activities related to improving balance, coordination, kinesthetic sense, posture, motor skill, proprioception and motor activation to allow for proper function of   [] LE: / Lumbar core, proximal hip and LE with self care and ADLs  [] UE / Cervical: cervical, postural, scapular, scapulothoracic and UE control with self care, carrying, lifting, driving, computer work. ADL Skills:  [] (61632) Provided self-care/home management training related to activities of daily living and compensatory training, and/or use of adaptive equipment for improvement with: ADLs and compensatory training, meal preparation, safety procedures and instruction in use of adaptive equipment, including bathing, grooming, dressing, personal hygiene, basic household cleaning and chores.      Home Exercise Program:    [] (30677) Reviewed/Progressed HEP activities related to strengthening, flexibility, endurance, ROM of   [] LE / Lumbar: core, proximal hip and LE for functional self-care, mobility, lifting and ambulation/stair navigation   [] UE / Cervical: cervical, postural, scapular, scapulothoracic and UE control with self care, reaching, carrying, lifting, house/yardwork, driving, computer work  [] (85029)Reviewed/Progressed HEP activities related to improving balance, coordination, kinesthetic sense, posture, motor skill, proprioception of   [] LE: core, proximal hip and LE for self care, mobility, lifting, and ambulation/stair navigation    [] UE / Cervical: cervical, postural,  scapular, scapulothoracic and UE control with self care, reaching, carrying, lifting, house/yardwork, driving, computer work    Manual Treatments:  PROM / STM / Oscillations-Mobs:  G-I, II, III, IV (PA's, Inf., Post.)  [] (63911) Provided manual therapy to mobilize LE, proximal hip and/or LS spine soft tissue/joints for the purpose of modulating pain, promoting relaxation,  increasing ROM, reducing/eliminating soft tissue swelling/inflammation/restriction, improving soft tissue extensibility and allowing for proper ROM for normal function with   [] LE / lumbar: self care, mobility, lifting and ambulation. [] UE / Cervical: self care, reaching, carrying, lifting, house/yardwork, driving, computer work. Acquatic:   [] (03022) Pt performing prescribed therapeutic exercises,neuromuscular re-education that is peroformed in a water based environment. Involves a heated therapy pool to mitigate the effects of gravity and/or optimize patient ability by way of soft tissue benefits of immersion in warm water during therapeutic exercise. Cognitive Training:   [] (95330) Activity designed to address attention, problem solving, and memory with or without compensatory techniques. Provided due to challenges with one or more of the following performance deficits: decreased working memory, decreased initiation, decreased safety awareness, decreased sequencing, and attending. Orthotic/Splint Management and Training:  [] (28350) Is used when a brace is needed to support a weak or deformed body part  or to decrease and/or restrict movement. Once the most appropriate orthotic is selected, the patient is then fitted and trained in how to use the orthotic. The goal of an orthotics is to facilitate immobilization, to enhance function by way of mitigating functional limitations, or to prevent future limitations. Orthotics may be pre-fabricated, custom fabricated, or molded-to patient model orthotic. Modalities:      Ultrasound:  [] (44361) Is a thermal modality which provides deep heat and it has various physiological effects. The sound waves emitted are absorbed by soft tissues such as joint capsules, ligaments, and tendons and has the capacity to increase the mobility of such tissues.   This modality is primarily used with individuals who need to increase soft tissue extensibility. Paraffin:  [] (74104) Paraffin baths are precise instruments designed to safely facilitate hot paraffin treatments of the body extremities so as to relieve pain and discomfort associated with joint disease and/or injuries. This modality is used as a preparatory method prior to skilled occupational therapy services. Fluidotherapy:  [] (71248) Is a dry heat which is comprised of a whirlpool of solid particles in a heated air stream, thus, having similar properties to a liquid. This is indicated to increase soft tissue distensibility, desensitize, or increase sensitization. Electrical Stimulation Attended (64314): One on one and constant attendance and direct manual patient contact. This can be chosen when e-stim is applied in addition to a therapeutic exercise of functional activity (says do not bill for TA/ TE in the same session?)    Electrical Stimulation Unattended (89238/): Is billed when pt is being supervised but does not require one on one care. This may be used for pain relief via TENS, to manage spasticity, decrease muscle atrophy/ promote innervation of denervated musculature, etc. during instances in which the pt does not need care or is not engaging in a functional activity in conjunction with the use of the electrical stimulation. Contrast Bath (70501): Immersion of peripheral extremities in cold and warm water in an alternating fashion.   This is performed in order to reduce scar sensitivity and decrease swelling      Charges:  Timed Code Treatment Minutes: 15   Total Treatment Minutes: 30     Charges:                                                Quantity:  [] EVAL (LOW) 82851                  I               [x] EVAL (MOD) 56920   I 1     [] EVAL (HIGH) 27637  I  [] OT Re-eval (10743)  I   [] Crystal [phone])     I  [] AFYUH(86971)   I  [] NMR (63513)    I  [] Estim (attended) (71917)   I  [] Manual (75247)      I  [] JL (85570)    I  [x] TA Poor    Patient Requires Follow-up: [x] Yes  [] No    Plan for next treatment session:    PLAN: See eval. OT2x / week for 6 weeks. [] Continue per plan of care [] Alter current plan (see comments)  [x] Plan of care initiated [] Hold pending MD visit [] Discharge    Electronically signed by: Renata David OT         Note: If patient does not return for scheduled/ recommended follow up visits, his note will serve as a discharge from care along with most recent update on progress.

## 2021-03-09 NOTE — FLOWSHEET NOTE
[facility]  Phone: [phone]   Fax: [phone]    Physical Therapy Daily Treatment Note  Date:  3/9/2021    Patient Name:  Getachew Valverde    :  1975  MRN: [id number]  Medical/Treatment Diagnosis Information:  · Diagnosis: Z86.73 (ICD-10-CM) - History of CVA (cerebrovascular accident)  · Treatment Diagnosis: Decreased stamina and BLE strength, Decreased B hamstring length, Impaired balance & gait  Insurance/Certification information:  PT Insurance Information: [employer]. 30 PT/OT/SLP visits pcy. PA after   Physician Information:  Referring Practitioner: Michaela Weathers MD  Plan of care signed (Y/N): []  Yes [x]  No     Date of Patient follow up with Physician:      Progress Report: []  Yes  [x]  No     Date Range for reporting period:  Beginning  3/8/2021    Ending      Progress report due (10 Rx/or 30 days whichever is less): visit #10 or 36     Recertification due (POC duration/ or 90 days whichever is less): visit # or 21 (date)     Visit # Insurance Allowable Auth required?  Date Range    30 [x]  Yes, after   []  No pcy     Latex Allergy:  [x]NO      []YES  Preferred Language for Healthcare:   [x]English       []Other:      Functional Scale/Test Evaluation 3/8/2021 30 day  60 day  Discharge    Tinetti Assessment npv       TUG npv                   Pain level:  1-7/10  Location:  Lower back    SUBJECTIVE:  See eval    OBJECTIVE: See eval      RESTRICTIONS/PRECAUTIONS:  CHF, diabetic neuropathy & retinopathy, HTN, smoker    Interventions/Exercises:   Therapeutic Exercises (70209) Resistance / level Sets/sec Reps Notes   Supine       Seated                     Neuromuscular Re-ed (82600) /  Gait Training (43660)       Precious Preciado  Balance                                                 Therapeutic Activities (10826) /  Functional Tasks       6MWT       Step ups 2\" Manual Intervention (15390)                                                     Modalities:     Pt. Education:  3/8:  -patient educated on diagnosis, prognosis and expectations for rehab  -all patient questions were answered    HEP instruction:  Access Code: [email or web address]  URL: [email or web address]  Date: 03/08/2021  Prepared by: Abraham Catherine     Exercises  Seated Table Hamstring Stretch - 3 sets - 30 secs hold - 2x daily - 7x weekly  Beginner Bridge - 10 reps - 2x daily - 7x weekly  Walking - 3-5 mins hold - 10x daily - 7x weekly    NMR and Therapeutic Activities:    [] (18609 or 66069) Provided verbal/tactile cueing for activities related to improving balance, coordination, kinesthetic sense, posture, motor skill, proprioception and motor activation to allow for proper function of   [] LE / Core, hip and LE with self care and ADLs  [] UE / Shoulder complex: cervical, postural, scapular, scapulothoracic and UE control with self care, carrying, lifting, driving, computer work.   [] (17916) Gait Re-education- Provided training and instruction to the patient for proper LE, core and hip recruitment, positioning, and eccentric body weight control with ambulation re-education, including ascending & descending stairs     Home Management Training / Self Care:  [] (67353) Provided self-care/home management training related to activities of daily living and compensatory training, and/or use of adaptive equipment for improvement with: ADLs and compensatory training, meal preparation, safety procedures and instruction in use of adaptive equipment, including bathing, grooming, dressing, personal hygiene, basic household cleaning and chores. Therapeutic Exercise and NMR EXR  [] (56243) Provided verbal/tactile cueing for activities related to strengthening, flexibility, endurance, ROM for improvements in  [] LE / Core stability: LE, hip, and core control with self care, mobility, lifting, ambulation.   [] UE / Shoulder complex: cervical, postural, scapular, scapulothoracic and UE control with self care, reaching, carrying, lifting, house/yardwork, driving, computer work.  [] (80445) Provided verbal/tactile cueing for activities related to improving balance, coordination, kinesthetic sense, posture, motor skill, proprioception to assist with   [] LE / Core stability: LE, hip, and core control in self care, mobility, lifting, ambulation and eccentric single leg control. [] UE / Shoulder complex: cervical, scapular, scapulothoracic and UE control with self care, reaching, carrying, lifting, house/yardwork, driving, computer work.   [] (25559) Therapist is in constant attendance of 2 or more patients providing skilled therapy interventions, but not providing any significant amount of measurable one-on-one time to either patient, for improvements in  [] LE / Core stability: LE, hip, and core control in self care, mobility, lifting, ambulation and eccentric single leg control. [] UE / Shoulder complex: cervical, scapular, scapulothoracic and UE control with self care, reaching, carrying, lifting, house/yardwork, driving, computer work.      Home Exercise Program:    [x] (88811) Reviewed/Progressed HEP activities related to strengthening, flexibility, endurance, ROM of   [] LE / Core stability: core, hip and LE for functional self-care, mobility, lifting and ambulation/stair navigation   [] UE / Shoulder complex: cervical, postural, scapular, scapulothoracic and UE control with self care, reaching, carrying, lifting, house/yardwork, driving, computer work  [] (46304)Reviewed/Progressed HEP activities related to improving balance, coordination, kinesthetic sense, posture, motor skill, proprioception of   [] LE: core, hip and LE for self care, mobility, lifting, and ambulation/stair navigation    [] UE / Shoulder complex: cervical, postural,  scapular, scapulothoracic and UE control with self care, reaching, carrying, lifting, Term Goals: To be achieved in: 8 weeks  1. Patient to demonstrate TUG score <20 seconds to demonstrate improved fall risk to assist with reaching prior level of function. []? Progressing: []? Met: []? Not Met: []? Adjusted  2. Patient will demonstrate increased B hamstring length in 90/90 to lacking 25 deg to allow for proper joint functioning as indicated by patients Functional Deficits. []? Progressing: []? Met: []? Not Met: []? Adjusted  3. Patient will demonstrate an increase in BLE strength to at least 4/5 throughout to allow for proper functional mobility as indicated by patients Functional Deficits. []? Progressing: []? Met: []? Not Met: []? Adjusted  4. Patient will return to functional activities including cooking without increased symptoms or restriction. []? Progressing: []? Met: []? Not Met: []? Adjusted  5. Patient to amb w/RW 1000' over even & uneven surfaces IND in order to return to community activities. []? Progressing: []? Met: []? Not Met: []? Adjusted            Overall Progression Towards Functional goals/ Treatment Progress Update:  [] Patient is progressing as expected towards functional goals listed. [] Progression is slowed due to complexities/Impairments listed. [] Progression has been slowed due to co-morbidities.   [x] Plan just implemented, too soon to assess goals progression <30days   [] Goals require adjustment due to lack of progress  [] Patient is not progressing as expected and requires additional follow up with physician  [] Other    Persisting Functional Limitations/Impairments:  []Sleeping [x]Sitting               [x]Standing [x]Transfers        [x]Walking []Kneeling               [x]Stairs [x]Squatting / bending   [x]ADLs [x]Reaching  [x]Lifting  [x]Housework  []Driving [x]Job related tasks  []Sports/Recreation []Other:        ASSESSMENT:  See eval    Treatment/Activity Tolerance:  [] Patient able to complete tx  [x] Patient limited by fatigue  [] Patient limited by pain  [] Patient limited by other medical complications  [] Other:     Prognosis: [] Good [x] Fair  [] Poor    Patient Requires Follow-up: [x] Yes  [] No    Plan for next treatment session:    PLAN: See eval. PT 1-2x / week for 8 weeks. [] Continue per plan of care [] Alter current plan (see comments)  [x] Plan of care initiated [] Hold pending MD visit [] Discharge    Electronically signed by: Lashawn Lizama PT, DPT    Note: If patient does not return for scheduled/ recommended follow up visits, his note will serve as a discharge from care along with most recent update on progress.

## 2021-03-15 ENCOUNTER — HOSPITAL ENCOUNTER (OUTPATIENT)
Dept: PHYSICAL THERAPY | Age: 46
Setting detail: THERAPIES SERIES
Discharge: HOME | End: 2021-03-15
Payer: COMMERCIAL

## 2021-03-15 NOTE — FLOWSHEET NOTE
Physical Therapy  Cancellation/No-show Note  Patient Name:  Erin Prince  :  1975   Date:  3/15/2021  Cancelled visits to date: 1  No-shows to date: 0    Patient status for today's appointment patient:  [x]  Cancelled   3/15  []  Rescheduled appointment  []  No-show     Reason given by patient:  [x]  Patient ill  []  Conflicting appointment  []  No transportation    []  Conflict with work  []  No reason given  []  Other:     Comments:      Phone call information:   []  Phone call made today to patient at _ time at number provided:      []  Patient answered, conversation as follows:    []  Patient did not answer, message left as follows:  []  Phone call not made today    Electronically signed by:  Bairon Nguyen PT

## 2021-03-22 ENCOUNTER — HOSPITAL ENCOUNTER (OUTPATIENT)
Dept: PHYSICAL THERAPY | Age: 46
Setting detail: THERAPIES SERIES
Discharge: HOME | End: 2021-03-22
Payer: COMMERCIAL

## 2021-03-22 NOTE — FLOWSHEET NOTE
Physical Therapy  Cancellation/No-show Note  Patient Name:  Neymar Holm  :  1975   Date:  3/22/2021  Cancelled visits to date: 2  No-shows to date: 0    Patient status for today's appointment patient:  [x]  Cancelled   3/15, 3/22  []  Rescheduled appointment  []  No-show     Reason given by patient:  []  Patient ill  []  Conflicting appointment  []  No transportation    []  Conflict with work  []  No reason given  [x]  Other:     Comments:   Car broke down    Phone call information:   []  Phone call made today to patient at _ time at number provided:      []  Patient answered, conversation as follows:    []  Patient did not answer, message left as follows:  [x]  Phone call not made today    Electronically signed by:  Mellissa Patterson PT

## 2021-03-29 ENCOUNTER — HOSPITAL ENCOUNTER (OUTPATIENT)
Dept: PHYSICAL THERAPY | Age: 46
Setting detail: THERAPIES SERIES
Discharge: HOME | End: 2021-03-29
Payer: COMMERCIAL

## 2021-03-29 PROCEDURE — 97150 GROUP THERAPEUTIC PROCEDURES: CPT

## 2021-03-29 PROCEDURE — 97113 AQUATIC THERAPY/EXERCISES: CPT

## 2021-03-29 NOTE — FLOWSHEET NOTE
[facility]  Phone: [phone]   Fax: [phone]    Physical Therapy Daily Treatment Note  Date:  3/29/2021    Patient Name:  Abhijit Read    :  1975  MRN: [id number]  Medical/Treatment Diagnosis Information:  · Diagnosis: Z86.73 (ICD-10-CM) - History of CVA (cerebrovascular accident)  · Treatment Diagnosis: Decreased stamina and BLE strength, Decreased B hamstring length, Impaired balance & gait  Insurance/Certification information:  PT Insurance Information: [employer]. 30 PT/OT/SLP visits pcy. PA after   Physician Information:  Referring Practitioner: Prachi Robertson MD  Plan of care signed (Y/N): []  Yes [x]  No     Date of Patient follow up with Physician:      Progress Report: []  Yes  [x]  No     Date Range for reporting period:  Beginning  3/8/2021    Ending      Progress report due (10 Rx/or 30 days whichever is less): visit #10 or 43     Recertification due (POC duration/ or 90 days whichever is less): visit # or 21 (date)     Visit # Insurance Allowable Auth required? Date Range    30 [x]  Yes, after   []  No pcy     Latex Allergy:  [x]NO      []YES  Preferred Language for Healthcare:   [x]English       []Other:      Functional Scale/Test Evaluation 3/8/2021 30 day  60 day  Discharge    Tinetti Assessment npv       TUG npv                   Pain level:  5/10  Location:  Lower back    SUBJECTIVE:  Pt reports she feels okay today, is excited to try aquatic therapy.     OBJECTIVE: See eval      RESTRICTIONS/PRECAUTIONS:  CHF, diabetic neuropathy & retinopathy, HTN, smoker    Interventions/Exercises:   Therapeutic Exercises (02667) Resistance / level Sets/sec Reps Notes   Supine       Seated                     Neuromuscular Re-ed (60707) /  Gait Training (65978)       Shalom Choudhury  Balance                                                 Therapeutic Activities (37567) /  Functional Tasks       6MWT       Step ups 2\"                                                       Manual Intervention (47679)                                                   AquaticTherapy Dates of Service: 3/29  Aquatic Visits Exercises/Activities:   Transfers:          % Immersion:            Ambulation:   UE Exercises:       Forwards  1 lap  Shoulder Shrugs      Lateral   1 lap Shoulder Circles      Retro    Scapular Retraction       Marching   Push Downs       [place]   Punching     Jog    Rowing     Multifidi walkouts with paddle   Elbow Flex/Ext       Shldr Flex/Ext       [employer] aBd/aDd    LE Exercises:  Shldr Horiz aBd/aDd    HR/TR x15 Shldr IR/ER    Marches x10 B Arm Circles    Squats  x10 PNF Diagonals    Hamstring Curls  Wall Push Ups    Hip Flexion (SLR) x10 B Figure 8's    Hip aBduction (SLR) x10 B Bilateral Pull Downs    Hip Extension (SLR) x10 B      Hip aDduction (SLR)      Hip Circles  Functional:    Hip IR/Er  Step up forward x10 B   TrA Set  x10 Step up lateral     Pelvic Tilts   Step down     Fig 8's   Lunges Forward    LE PNF  Lunges Retro      Lunges Lateral     Balance:   Lower ab curl with noodle     SLS        Tandem Stance       NBOS eyes open  Seated:     NBOS eyes closed x30\" Ankle pumps     Hand to Opposite Knee x10 B Ankle Circles     Fwd Step ups to SLS  Knee Flex/Ext    Lateral Step ups to SLS  Hip aBd/aDd    Stop/Go Gait   Bicycle       Ankle DF/PF      Ankle Inv/Ev    Stretching:       Gastroc/Soleus      Hamstring  2x30\" B Noodle:     Knee Flex Stretch  Leg Press    Piriformis  2x30\" R Noodle Hang at [employer] [place]    Hip Flexor  Noodle Hang Deep Water    SKTC  Noodle Bicycle     DKTC       ITB      Quad  Deep Water:    Mid Back   Jog    UT  Jumping Jacks    Post Shoulder  Heel to Toe    Ladder Pull  Hand to Opposite Knee    Pec Stretch  Rocking Horse      [employer] Skier          Cervical:   Other:     AROM Flexion      AROM Extension      AROM Side Bending      AROM Rotation      Chin Tucks      Chin Nods        Aquatic Abbreviation Key  B= Belt DB= Dumbells T= Theratube   H= Hydrotone N= Noodles W= Weights   P= Paddles S= Speedo equipment K= Kickboard         Modalities:     Pt. Education:  3/29: stair negotiation with step to pattern to   -patient educated on diagnosis, prognosis and expectations for rehab  -all patient questions were answered    HEP instruction:  Access Code: [email or web address]  URL: [employer]/  Date: 03/08/2021  Prepared by: Ary Rodriguez     Exercises  Seated Table Hamstring Stretch - 3 sets - 30 secs hold - 2x daily - 7x weekly  Beginner Bridge - 10 reps - 2x daily - 7x weekly  Walking - 3-5 mins hold - 10x daily - 7x weekly    NMR and Therapeutic Activities:    [] (60026 or 71151) Provided verbal/tactile cueing for activities related to improving balance, coordination, kinesthetic sense, posture, motor skill, proprioception and motor activation to allow for proper function of   [] LE / Core, hip and LE with self care and ADLs  [] UE / Shoulder complex: cervical, postural, scapular, scapulothoracic and UE control with self care, carrying, lifting, driving, computer work.   [] (88394) Gait Re-education- Provided training and instruction to the patient for proper LE, core and hip recruitment, positioning, and eccentric body weight control with ambulation re-education, including ascending & descending stairs     Home Management Training / Self Care:  [] (19901) Provided self-care/home management training related to activities of daily living and compensatory training, and/or use of adaptive equipment for improvement with: ADLs and compensatory training, meal preparation, safety procedures and instruction in use of adaptive equipment, including bathing, grooming, dressing, personal hygiene, basic household cleaning and chores.      Therapeutic Exercise and NMR EXR  [] (46561) Provided verbal/tactile cueing for activities related to strengthening, flexibility, endurance, ROM for improvements in  [] LE / Core stability: LE, hip, and core control with self care, mobility, lifting, ambulation. [] UE / Shoulder complex: cervical, postural, scapular, scapulothoracic and UE control with self care, reaching, carrying, lifting, house/yardwork, driving, computer work.  [] (18751) Provided verbal/tactile cueing for activities related to improving balance, coordination, kinesthetic sense, posture, motor skill, proprioception to assist with   [] LE / Core stability: LE, hip, and core control in self care, mobility, lifting, ambulation and eccentric single leg control. [] UE / Shoulder complex: cervical, scapular, scapulothoracic and UE control with self care, reaching, carrying, lifting, house/yardwork, driving, computer work. [x] (87529) Aquatic therapy with therapeutic exercise. Provided verbal and tactile cueing for activities related to strengthening, flexibility, endurance, ROM for improvements in  [x] LE / lumbar: LE, proximal hip, and core control in self care, mobility, lifting, ambulation and eccentric single leg control. [] UE / cervical: cervical, scapular, scapulothoracic and UE control with self care, reaching, carrying, lifting, house/yardwork, driving, computer work. [x] (29390) Therapist is in constant attendance of 2 or more patients providing skilled therapy interventions, but not providing any significant amount of measurable one-on-one time to either patient, for improvements in  [x] LE / lumbar: LE, proximal hip, and core control in self care, mobility, lifting, ambulation and eccentric single leg control. [] UE / cervical: cervical, scapular, scapulothoracic and UE control with self care, reaching, carrying, lifting, house/yardwork, driving, computer work.        Home Exercise Program:    [x] (97799) Reviewed/Progressed HEP activities related to strengthening, flexibility, endurance, ROM of   [] LE / Core stability: core, hip and LE for functional self-care, mobility, lifting and ambulation/stair navigation   [] UE / Shoulder complex: cervical, postural, scapular, scapulothoracic and UE control with self care, reaching, carrying, lifting, house/yardwork, driving, computer work  [] (86089)Reviewed/Progressed HEP activities related to improving balance, coordination, kinesthetic sense, posture, motor skill, proprioception of   [] LE: core, hip and LE for self care, mobility, lifting, and ambulation/stair navigation    [] UE / Shoulder complex: cervical, postural,  scapular, scapulothoracic and UE control with self care, reaching, carrying, lifting, house/yardwork, driving, computer work    Manual Treatments:  PROM / STM / Oscillations-Mobs:  G-I, II, III, IV (PA's, Inf., Post.)  [] (80983) Provided manual therapy to mobilize shoulder complex, hip, LE, and/or cervicothoracic/LS spine soft tissue/joints for the purpose of modulating pain, promoting relaxation,  increasing ROM, reducing/eliminating soft tissue swelling/inflammation/restriction, improving soft tissue extensibility and allowing for proper ROM for normal function with   [] LE / Core stability: self care, mobility, lifting and ambulation. [] UE / Shoulder complex: self care, reaching, carrying, lifting, house/yardwork, driving, computer work. Modalities:  [] (83535) Vasopneumatic compression: Utilized vasopneumatic compression to decrease edema / swelling for the purpose of improving mobility and quad tone / recruitment which will allow for increased overall function including but not limited to self-care, transfers, ambulation, and ascending / descending stairs.          Charges:  Timed Code Treatment Minutes: 15   Total Treatment Minutes: 40     [] EVAL - LOW (12646)   [] EVAL - MOD (66373)  [] EVAL - HIGH (44227)  [] RE-EVAL (64337)  [] TE (96398) x      [] Ionto  [] NMR (64338) x      [] Vaso  [] Manual (01119) x      [] Ultrasound  [] TA x       [] Mech Traction (03557)  [] Gait Training x     [] ES (un) (18974):   [x] Aquatic therapy x1   [] Other:   [x] Group: 1    GOALS:   Patient stated goal: improve balance and gait  []? Progressing: []? Met: []? Not Met: []? Adjusted     Therapist goals for Patient:   Short Term Goals: To be achieved in: 2 weeks  1. Independent in HEP and progression per patient tolerance, in order to prevent re-injury. []? Progressing: []? Met: []? Not Met: []? Adjusted  2. Patient will have a decrease in pain to facilitate improvement in movement, function, and ADLs as indicated by Functional Deficits. []? Progressing: []? Met: []? Not Met: []? Adjusted     Long Term Goals: To be achieved in: 8 weeks  1. Patient to demonstrate TUG score <20 seconds to demonstrate improved fall risk to assist with reaching prior level of function. []? Progressing: []? Met: []? Not Met: []? Adjusted  2. Patient will demonstrate increased B hamstring length in 90/90 to lacking 25 deg to allow for proper joint functioning as indicated by patients Functional Deficits. []? Progressing: []? Met: []? Not Met: []? Adjusted  3. Patient will demonstrate an increase in BLE strength to at least 4/5 throughout to allow for proper functional mobility as indicated by patients Functional Deficits. []? Progressing: []? Met: []? Not Met: []? Adjusted  4. Patient will return to functional activities including cooking without increased symptoms or restriction. []? Progressing: []? Met: []? Not Met: []? Adjusted  5. Patient to amb w/RW 1000' over even & uneven surfaces IND in order to return to community activities. []? Progressing: []? Met: []? Not Met: []? Adjusted            Overall Progression Towards Functional goals/ Treatment Progress Update:  [] Patient is progressing as expected towards functional goals listed. [] Progression is slowed due to complexities/Impairments listed. [] Progression has been slowed due to co-morbidities.   [x] Plan just implemented, too soon to assess goals progression <30days   [] Goals require adjustment due to lack of progress  [] Patient is not progressing as expected and requires additional follow up with physician  [] Other    Persisting Functional Limitations/Impairments:  []Sleeping [x]Sitting               [x]Standing [x]Transfers        [x]Walking []Kneeling               [x]Stairs [x]Squatting / bending   [x]ADLs [x]Reaching  [x]Lifting  [x]Housework  []Driving [x]Job related tasks  []Sports/Recreation []Other:        ASSESSMENT:  Aquatic exercises introduced this date. Kept treatment volume low to manage fatigue, weakness, and soreness post tx. Pt tolerated well. Educated pt on step to stair pattern to maintain safety getting in/out of pool. Progress as tolerated. Treatment/Activity Tolerance:  [] Patient able to complete tx  [x] Patient limited by fatigue  [] Patient limited by pain  [] Patient limited by other medical complications  [] Other:     Prognosis: [] Good [x] Fair  [] Poor    Patient Requires Follow-up: [x] Yes  [] No    Plan for next treatment session:    PLAN: See eval. PT 1-2x / week for 8 weeks. [x] Continue per plan of care [] Alter current plan (see comments)  [] Plan of care initiated [] Hold pending MD visit [] Discharge    Electronically signed by: Bernarda Wise PT, DPT    Note: If patient does not return for scheduled/ recommended follow up visits, his note will serve as a discharge from care along with most recent update on progress.

## 2021-04-05 ENCOUNTER — HOSPITAL ENCOUNTER (OUTPATIENT)
Dept: PHYSICAL THERAPY | Age: 46
Setting detail: THERAPIES SERIES
Discharge: HOME | End: 2021-04-05
Payer: COMMERCIAL

## 2021-04-05 PROCEDURE — 97113 AQUATIC THERAPY/EXERCISES: CPT

## 2021-04-05 PROCEDURE — 97150 GROUP THERAPEUTIC PROCEDURES: CPT

## 2021-04-05 NOTE — FLOWSHEET NOTE
AROM Extension      AROM Side Bending      AROM Rotation      Chin Tucks      Chin Nods        Aquatic Abbreviation Key  B= Belt DB= Dumbells T= Theratube   H= Hydrotone N= Noodles W= Weights   P= Paddles S= Speedo equipment K= Kickboard         Modalities:     Pt. Education:  3/29: stair negotiation with step to pattern to   -patient educated on diagnosis, prognosis and expectations for rehab  -all patient questions were answered    HEP instruction:  Access Code: [email or web address]  URL: [facility]/  Date: 03/08/2021  Prepared by: [facility]     Exercises  Seated Table Hamstring Stretch - 3 sets - 30 secs hold - 2x daily - 7x weekly  Beginner Bridge - 10 reps - 2x daily - 7x weekly  Walking - 3-5 mins hold - 10x daily - 7x weekly    NMR and Therapeutic Activities:    [] (35844 or 73370) Provided verbal/tactile cueing for activities related to improving balance, coordination, kinesthetic sense, posture, motor skill, proprioception and motor activation to allow for proper function of   [] LE / Core, hip and LE with self care and ADLs  [] UE / Shoulder complex: cervical, postural, scapular, scapulothoracic and UE control with self care, carrying, lifting, driving, computer work.   [] (86849) Gait Re-education- Provided training and instruction to the patient for proper LE, core and hip recruitment, positioning, and eccentric body weight control with ambulation re-education, including ascending & descending stairs     Home Management Training / Self Care:  [] (17559) Provided self-care/home management training related to activities of daily living and compensatory training, and/or use of adaptive equipment for improvement with: ADLs and compensatory training, meal preparation, safety procedures and instruction in use of adaptive equipment, including bathing, grooming, dressing, personal hygiene, basic household cleaning and chores.      Therapeutic Exercise and NMR EXR  [] (13768) Provided verbal/tactile cueing for activities related to strengthening, flexibility, endurance, ROM for improvements in  [] LE / Core stability: LE, hip, and core control with self care, mobility, lifting, ambulation. [] UE / Shoulder complex: cervical, postural, scapular, scapulothoracic and UE control with self care, reaching, carrying, lifting, house/yardwork, driving, computer work.  [] (65227) Provided verbal/tactile cueing for activities related to improving balance, coordination, kinesthetic sense, posture, motor skill, proprioception to assist with   [] LE / Core stability: LE, hip, and core control in self care, mobility, lifting, ambulation and eccentric single leg control. [] UE / Shoulder complex: cervical, scapular, scapulothoracic and UE control with self care, reaching, carrying, lifting, house/yardwork, driving, computer work. [x] (29078) Aquatic therapy with therapeutic exercise. Provided verbal and tactile cueing for activities related to strengthening, flexibility, endurance, ROM for improvements in  [x] LE / lumbar: LE, proximal hip, and core control in self care, mobility, lifting, ambulation and eccentric single leg control. [] UE / cervical: cervical, scapular, scapulothoracic and UE control with self care, reaching, carrying, lifting, house/yardwork, driving, computer work. [x] (86524) Therapist is in constant attendance of 2 or more patients providing skilled therapy interventions, but not providing any significant amount of measurable one-on-one time to either patient, for improvements in  [x] LE / lumbar: LE, proximal hip, and core control in self care, mobility, lifting, ambulation and eccentric single leg control. [] UE / cervical: cervical, scapular, scapulothoracic and UE control with self care, reaching, carrying, lifting, house/yardwork, driving, computer work.        Home Exercise Program:    [x] (80512) Reviewed/Progressed HEP activities related to strengthening, flexibility, endurance, ROM of   [] LE / Core stability: core, hip and LE for functional self-care, mobility, lifting and ambulation/stair navigation   [] UE / Shoulder complex: cervical, postural, scapular, scapulothoracic and UE control with self care, reaching, carrying, lifting, house/yardwork, driving, computer work  [] (54550)Reviewed/Progressed HEP activities related to improving balance, coordination, kinesthetic sense, posture, motor skill, proprioception of   [] LE: core, hip and LE for self care, mobility, lifting, and ambulation/stair navigation    [] UE / Shoulder complex: cervical, postural,  scapular, scapulothoracic and UE control with self care, reaching, carrying, lifting, house/yardwork, driving, computer work    Manual Treatments:  PROM / STM / Oscillations-Mobs:  G-I, II, III, IV (PA's, Inf., Post.)  [] (08624) Provided manual therapy to mobilize shoulder complex, hip, LE, and/or cervicothoracic/LS spine soft tissue/joints for the purpose of modulating pain, promoting relaxation,  increasing ROM, reducing/eliminating soft tissue swelling/inflammation/restriction, improving soft tissue extensibility and allowing for proper ROM for normal function with   [] LE / Core stability: self care, mobility, lifting and ambulation. [] UE / Shoulder complex: self care, reaching, carrying, lifting, house/yardwork, driving, computer work. Modalities:  [] (90059) Vasopneumatic compression: Utilized vasopneumatic compression to decrease edema / swelling for the purpose of improving mobility and quad tone / recruitment which will allow for increased overall function including but not limited to self-care, transfers, ambulation, and ascending / descending stairs.          Charges:  Timed Code Treatment Minutes: 15   Total Treatment Minutes: 35     [] EVAL - LOW (55059)   [] EVAL - MOD (05243)  [] EVAL - HIGH (13311)  [] RE-EVAL (83641)  [] TE (70228) x      [] Ionto  [] NMR (59635) x      [] Vaso  [] Manual (96957) x [] Ultrasound  [] TA x       [] [facility] Traction (45113)  [] Gait Training x     [] ES (un) (13063):   [x] Aquatic therapy x1   [] Other:   [x] Group: 1    GOALS:   Patient stated goal: improve balance and gait  []? Progressing: []? Met: []? Not Met: []? Adjusted     Therapist goals for Patient:   Short Term Goals: To be achieved in: 2 weeks  1. Independent in HEP and progression per patient tolerance, in order to prevent re-injury. []? Progressing: []? Met: []? Not Met: []? Adjusted  2. Patient will have a decrease in pain to facilitate improvement in movement, function, and ADLs as indicated by Functional Deficits. []? Progressing: []? Met: []? Not Met: []? Adjusted     Long Term Goals: To be achieved in: 8 weeks  1. Patient to demonstrate TUG score <20 seconds to demonstrate improved fall risk to assist with reaching prior level of function. []? Progressing: []? Met: []? Not Met: []? Adjusted  2. Patient will demonstrate increased B hamstring length in 90/90 to lacking 25 deg to allow for proper joint functioning as indicated by patients Functional Deficits. []? Progressing: []? Met: []? Not Met: []? Adjusted  3. Patient will demonstrate an increase in BLE strength to at least 4/5 throughout to allow for proper functional mobility as indicated by patients Functional Deficits. []? Progressing: []? Met: []? Not Met: []? Adjusted  4. Patient will return to functional activities including cooking without increased symptoms or restriction. []? Progressing: []? Met: []? Not Met: []? Adjusted  5. Patient to amb w/RW 1000' over even & uneven surfaces IND in order to return to community activities. []? Progressing: []? Met: []? Not Met: []? Adjusted            Overall Progression Towards Functional goals/ Treatment Progress Update:  [] Patient is progressing as expected towards functional goals listed. [] Progression is slowed due to complexities/Impairments listed.   [] Progression has been slowed due to co-morbidities. [x] Plan just implemented, too soon to assess goals progression <30days   [] Goals require adjustment due to lack of progress  [] Patient is not progressing as expected and requires additional follow up with physician  [] Other    Persisting Functional Limitations/Impairments:  []Sleeping [x]Sitting               [x]Standing [x]Transfers        [x]Walking []Kneeling               [x]Stairs [x]Squatting / bending   [x]ADLs [x]Reaching  [x]Lifting  [x]Housework  []Driving [x]Job related tasks  []Sports/Recreation []Other:        ASSESSMENT:  Aquatic exercises performed, pt to dept holding onto friend on pool deck. Kept treatment volume low to manage fatigue, weakness, and soreness post tx. Pt tolerated well. Educated pt on step to stair pattern to maintain safety getting in/out of pool. Progress as tolerated. Required cues to perform exercises correctly     Treatment/Activity Tolerance:  [] Patient able to complete tx  [x] Patient limited by fatigue  [] Patient limited by pain  [] Patient limited by other medical complications  [] Other:     Prognosis: [] Good [x] Fair  [] Poor    Patient Requires Follow-up: [x] Yes  [] No    Plan for next treatment session:    PLAN: See eval. PT 1-2x / week for 8 weeks. [x] Continue per plan of care [] Alter current plan (see comments)  [] Plan of care initiated [] Hold pending MD visit [] Discharge    Electronically signed by: Bobby Rodriguez PT, DPT    Note: If patient does not return for scheduled/ recommended follow up visits, his note will serve as a discharge from care along with most recent update on progress.

## 2021-04-12 ENCOUNTER — APPOINTMENT (OUTPATIENT)
Dept: PHYSICAL THERAPY | Age: 46
End: 2021-04-12
Payer: COMMERCIAL

## 2021-04-19 ENCOUNTER — HOSPITAL ENCOUNTER (OUTPATIENT)
Dept: PHYSICAL THERAPY | Age: 46
Setting detail: THERAPIES SERIES
Discharge: HOME | End: 2021-04-19
Payer: COMMERCIAL

## 2021-04-19 NOTE — FLOWSHEET NOTE
Physical Therapy  Cancellation/No-show Note  Patient Name:  Scott Magaña  :  1975   Date:  2021  Cancelled visits to date: 2  No-shows to date: 1    Patient status for today's appointment patient:  []  Cancelled   3/15, 3/22  []  Rescheduled appointment  [x]  No-show      Reason given by patient:  []  Patient ill  []  Conflicting appointment  []  No transportation    []  Conflict with work  [x]  No reason given  []  Other:     Comments:   Car broke down    Phone call information:   []  Phone call made today to patient at _ time at number provided:      []  Patient answered, conversation as follows:    []  Patient did not answer, message left as follows:  [x]  Phone call not made today    Electronically signed by:  Patricia Hubbard PT, DPT

## 2021-07-04 NOTE — PROGRESS NOTES
Juan Ambrose   55 y.o. female   1975    This is patient's first visit with me. Juan Ambrose is here to establish care as their new PCP. Juan Ambrose has a PMH significant for:    Patient Active Problem List   Diagnosis    Type 2 diabetes mellitus, with long-term current use of insulin ([address]    Mixed hyperlipidemia    Migraine headache    Anemia    Diabetic foot infection ([address]    Pyogenic inflammation of bone ([address]    History of medication noncompliance    Osteomyelitis of left foot (HCC)    Nephrotic syndrome    Peripheral edema    Pulmonary edema    Right sided numbness    Tobacco dependence    Chronic kidney disease (CKD), stage III (moderate) (HCC)    Chronic diastolic (congestive) heart failure (HCC)    History of CVA (cerebrovascular accident)    Arterial ischemic stroke, ICA, left, acute ([address]    HTN (hypertension), benign    DM (diabetes mellitus), secondary, uncontrolled, w/neurologic complic ([address]    Dyslipidemia    Smoker    Panic disorder without agoraphobia    Isolated proteinuria    Acute on chronic diastolic heart failure ([address]    Diabetic peripheral neuropathy ([address]       Reason(s) for visit:   Chief Complaint   Patient presents with    Migraine    Anxiety     Seeing a psychiatrist.    Diabetes    Established New Doctor       HPI:    Chart review:  -Patient has a history of type 2 diabetes. Her last A1c on file was on 2/24/2021 it was 8.9.  -She was admitted to [facility] ER for treatment of acute on chronic diastolic CHF on 7/57/1912 and discharged on 2/26 2021. No echo was done during her hospital stay. Her last echocardiogram was on 8/27/2020, which showed normal left ventricular size and systolic function with an estimate ejection fraction of 55%. Moderate LVH with diastolic dysfunction was noted. The rest of the study was unremarkable. Office visit encounter:    Patient came today with her .     Type II DM:  -Patient has not been on any GLP-1 before. Currently only on basal insulin.  -Can't check her glucose readings because she's blind (only sees white) and her  works 7 days a week as a  ([address] [place] [address]). He usually gets home 1 or 2 AM.  He works 12-14 hours/day. His work has a high turnover rate. Generalized anxiety/depression:  -Patient has been seeing a psychiatrist for over a year.  -In terms of social support, no one at home other than her cat. As noted above, her  is very busy.  -Has medical alert button. HF:  -BLE edema stable  -Denied dyspnea at rest or on exertion, palpitations, chest pain, etc.    Headache:  -Has ran out of meds. Other:  - is awaiting on disability for her wife. -Eye sight started going bad in Nov 2019. [address] working. Follows up with retina clinic with .  -Heart doctor recommended that she not get COVID-19 vaccine. As far as she knows, she hasn't had COVID-19 infection. Informed patient that she has a higher chance of developing myocarditis from COVID-19 virus as well as other viral infections than the vaccine itself. PDMP monitoring:  -PDMP report was remarkable for:  [] Narcotics:  [x] Benzodiazepines: Lorazepam 0.5 mg #60 tabs - monthly rxs since Sept 2020. Last filled on 6/7/2021.   [] Stimulants:  [] Sedatives:  [x] Neuropathic meds: Lyrica last filled in March 2021  [] Medical cannabis:  [] Other:  -Last report:   Last PDMP Janel Chilel as Reviewed [facility]):  Review User Review Instant Review Result   [address] [employer] 7/3/2021  9:03 PM Reviewed PDMP [1]       Allergies   Allergen Reactions    Amoxicillin Itching and Hives     Tolerates cephalosporins  Patient tolerating cefazolin (ANCEF) as of October 11, 2018  Patient tolerating cefazolin (ANCEF) as of October 11, 2018  Tolerates cephalosporins  Patient tolerating cefazolin (ANCEF) as of October 11, 2018    Levofloxacin Anaphylaxis    Levofloxacin Anaphylaxis    Vancomycin Shortness Of Breath, Hives and Anaphylaxis    Tape Bindu Peaks Tape] Other (See Comments)     Paper tape turns skin bright red. Plastic tape okay. Current Outpatient Medications on File Prior to Visit   Medication Sig Dispense Refill    LORazepam (ATIVAN) 0.5 MG tablet Take 0.5 mg by mouth 2 times daily as needed for Anxiety.  aspirin 81 MG EC tablet Take 1 tablet by mouth daily 30 tablet 3    pregabalin (LYRICA) 75 MG capsule Take 1 capsule by mouth nightly for 7 days. 7 capsule 0    sertraline (ZOLOFT) 50 MG tablet Take 1 tablet by mouth daily 30 tablet 3    insulin lispro, 1 Unit Dial, 100 UNIT/ML SOPN Inject 0-6 Units into the skin 3 times daily (with meals) **Corrective Low Dose Algorithm**  Glucose: Dose:               No Insulin  140-199 1 Unit  200-249 2 Units  250-299 3 Units  300-349 4 Units  350-399 5 Units  Over 399 6 Units (Patient taking differently: Inject 6-12 Units into the skin 3 times daily (with meals) **Corrective Low Dose Algorithm**  Glucose: Dose:               No Insulin  140-199 1 Unit  200-249 2 Units  250-299 3 Units  300-349 4 Units  350-399 5 Units  Over 399 6 Units) 3 pen 0    ibuprofen (ADVIL;MOTRIN) 200 MG CAPS Take 1 capsule by mouth      furosemide (LASIX) 80 MG tablet Take 80 mg by mouth every morning 80mg in the morning 40mg in the evening      carvedilol (COREG) 6.25 MG tablet Take 1 tablet by mouth 2 times daily (with meals) (Patient not taking: Reported on 7/8/2021) 60 tablet 1    nicotine (NICODERM CQ) 21 MG/24HR Place 1 patch onto the skin daily 30 patch 2    glucose monitoring kit (FREESTYLE) monitoring kit 1 kit by Does not apply route daily 1 kit 0    glucose blood VI test strips (VICTORY AGM-4000 TEST) strip Test four times daily until controlled and then three times daily. Code 250.02  ONE TOUCH ULTRA MONITOR 100 strip 12     No current facility-administered medications on file prior to visit.         Family History   Problem Relation Age of Onset    Diabetes Mother  Diabetes Father     High Blood Pressure Father     Cancer Father         colon    Diabetes Sister     Diabetes Paternal Grandmother        Social History     Tobacco Use    Smoking status: Current Every Day Smoker     Packs/day: 1.00     Types: Cigarettes    Smokeless tobacco: Never Used   Substance Use Topics    Alcohol use: No     Comment: Very Rare        Lab Results   Component Value Date    WBC 13.5 (H) 02/26/2021    HGB 8.2 (L) 02/26/2021    HCT 25.8 (L) 02/26/2021    MCV 87.4 02/26/2021     02/26/2021       Chemistry        Component Value Date/Time     02/26/2021 0429    K 4.1 02/26/2021 0429    K 3.3 (L) 02/23/2021 0248     02/26/2021 0429    CO2 18 (L) 02/26/2021 0429    BUN 49 (H) 02/26/2021 0429    CREATININE 2.5 (H) 02/26/2021 0429        Component Value Date/Time    CALCIUM 8.4 02/26/2021 0429    ALKPHOS 168 (H) 02/26/2021 0429    AST 17 02/26/2021 0429    ALT 12 02/26/2021 0429    BILITOT <0.2 02/26/2021 0429          Lab Results   Component Value Date    ALT 12 02/26/2021    AST 17 02/26/2021    ALKPHOS 168 (H) 02/26/2021    BILITOT <0.2 02/26/2021     Lab Results   Component Value Date    LABA1C 8.9 02/24/2021     Lab Results   Component Value Date    .7 02/24/2021       Review of Systems   Constitutional: Negative for activity change, appetite change, fatigue, fever and unexpected weight change. HENT: Negative for congestion, rhinorrhea, sinus pressure and trouble swallowing. Respiratory: Negative for cough, chest tightness, shortness of breath and wheezing. Cardiovascular: Negative for chest pain, palpitations and leg swelling. Gastrointestinal: Negative for abdominal distention, abdominal pain, blood in stool, constipation, diarrhea, nausea and vomiting. Genitourinary: Negative for dysuria, frequency and hematuria. Musculoskeletal: Negative for arthralgias and back pain. Skin: Negative for rash.    Neurological: Negative for dizziness, weakness, light-headedness, numbness and headaches. Psychiatric/Behavioral: Negative for sleep disturbance. The patient is not nervous/anxious. Wt Readings from Last 3 Encounters:   07/08/21 244 lb 11.2 oz (111 kg)   02/26/21 237 lb 3.2 oz (107.6 kg)   08/31/20 200 lb (90.7 kg)       BP Readings from Last 3 Encounters:   07/08/21 (!) 160/100   02/26/21 133/86   08/31/20 137/83       Pulse Readings from Last 3 Encounters:   07/08/21 97   02/26/21 81   08/31/20 88       BP (!) 160/100   Pulse 97   Temp 97.8 °F (36.6 °C)   Ht 5' 6\" (1.676 m)   Wt 244 lb 11.2 oz (111 kg)   LMP 06/08/2021   SpO2 99%   BMI 39.50 kg/m²      Physical Exam  Vitals reviewed. Constitutional:       General: She is awake. She is not in acute distress. Appearance: She is obese. She is not ill-appearing or diaphoretic. HENT:      Head: Normocephalic and atraumatic. No abrasion or masses. Hair is normal.      Right Ear: External ear normal.      Left Ear: External ear normal.      Nose: Nose normal.   Eyes:      General: Lids are normal. Gaze aligned appropriately. No scleral icterus. Right eye: No discharge. Left eye: No discharge. Extraocular Movements: Extraocular movements intact. Conjunctiva/sclera: Conjunctivae normal.   Neck:      Trachea: Phonation normal.   Cardiovascular:      Rate and Rhythm: Normal rate and regular rhythm. Pulmonary:      Effort: Pulmonary effort is normal. No respiratory distress. Breath sounds: No wheezing, rhonchi or rales. Abdominal:      General: Abdomen is flat. There is no distension. Palpations: Abdomen is soft. Musculoskeletal:         General: No deformity. Normal range of motion. Cervical back: Normal range of motion. No erythema. Right lower leg: No edema. Left lower leg: No edema. Skin:     Coloration: Skin is not cyanotic, jaundiced or pale.       Findings: No abrasion, abscess, bruising, ecchymosis, erythema, signs of injury, laceration, lesion, petechiae, rash or wound. Neurological:      General: No focal deficit present. Mental Status: She is alert. Mental status is at baseline. GCS: GCS eye subscore is 4. GCS verbal subscore is 5. GCS motor subscore is 6. Cranial Nerves: No cranial nerve deficit, dysarthria or facial asymmetry. Motor: No weakness, tremor, atrophy or seizure activity. Coordination: Coordination normal.      Gait: Gait is intact. Psychiatric:         Attention and Perception: Attention and perception normal.         Mood and Affect: Mood and affect normal.         Speech: Speech normal.         Behavior: Behavior normal. Behavior is cooperative. Thought Content: Thought content normal.       Assessment/Plan:   Yoly Zendejas was seen today for migraine, anxiety, diabetes and established new doctor. Diagnoses and all orders for this visit:    Encounter to establish care with new doctor    Type 2 diabetes mellitus with other specified complication, with long-term current use of insulin ([facility]  Comments:  Informed patient that given her advanced CKD, she's not a candidate for Metformin and SGLT-2 therapy as well as other oral meds that would greatly increase her risk of developing hypoglycemia. We also discussed about possibly switching her basal insulin (currently Basaglar) to [place], which is more effective with glycemic control and has a lower risk of causing hypoglycemia; however, will not do it at this time because she's out of her meds. I discussed with patient that having diabetes requires a major lifestyle change.   Extensive counseling was given to the patient, who was informed of the microvascular and macrovascular complications of diabetes: increased risk of CAD, MI, CVA, CKD/ESRD (leading to dialysis), diabetic retinopathy, diabetic gastroparesis, diabetic neuropathy, chronic wound infections (eg osteomyelitis), etc.      Patient was informed that diabetics need to be seen every 3 months for routine A1c blood testing and that the goal of A1c is under 7.0 (and under 6.5 for more aggressive treatment) for most patients and under 8.5 for the elderly. Patient was counseled also to take the medications as prescribed and to check glucose as directed, especially if they're symptomatic (malaise, weak, fatigue, clammy, dizzy, etc.) and to keep food/beverage with him at all times in case his glucose is low. Patient was also advised, especially in the setting of severe neuropathy with numbness to never walk around barefoot. Informed patient of benefits of being on GLP1 therapy that go beyond glycemic control - low risk of hypoglycemia, weight loss, decreased risk of cardiovascular complications, etc.  Orders:  -     Hemoglobin A1C; Future  -     Dulaglutide 0.75 MG/0.5ML SOPN; Inject 0.75 mg into the skin once a week  -     insulin glargine (LANTUS;BASAGLAR) 100 UNIT/ML injection pen; Inject 30 Units into the skin daily  -     Insulin Pen Needle 29G X 12.7MM MISC; 1 each by Does not apply route daily    Essential hypertension  Comments: Will restart meds. Advised low salt diet. Will increase Propranolol. Informed patient that Propranolol can be used to treat HTN, tachycardia, prevent migraines, generalized anxiety, and tremors. Orders:  -     TSH without Reflex; Future  -     T4, Free; Future  -     Hepatic Function Panel; Future  -     amLODIPine (NORVASC) 10 MG tablet; Take 1 tablet by mouth daily  -     furosemide (LASIX) 40 MG tablet; Take 1 tablet by mouth every evening  -     furosemide (LASIX) 80 MG tablet; Take 1 tablet by mouth daily  -     lisinopril (PRINIVIL;ZESTRIL) 40 MG tablet; Take 1 tablet by mouth daily  -     propranolol (INDERAL LA) 80 MG extended release capsule; Take 1 capsule by mouth every morning    Stage 3a chronic kidney disease ([address]  Comments:  Reviewed nephrologist's notes. Patient is past due for follow up with her nephrologist (Dr. Berlin Allan).  Will refer her back. Orders:  -     [facility]) - Mohamud Ramirez MD, Nephrology, [facility] ([facility])  -     spironolactone (ALDACTONE) 50 MG tablet; Take 1 tablet by mouth daily  -     MICROALBUMIN / CREATININE URINE RATIO; Future  -     Urinalysis; Future    Nephrotic syndrome  Comments: Will restart meds. 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 Mixed hyperlipidemia  -     Lipid Panel; Future  -     atorvastatin (LIPITOR) 40 MG tablet; Take 1 tablet by mouth nightly    Chronic migraine  -     propranolol (INDERAL LA) 80 MG extended release capsule; Take 1 capsule by mouth every morning    Mixed anxiety and depressive disorder  Comments:  Continue Sertraline for now. Patient seeing a psychiatrist.       I reviewed the plan of care with the patient. Patient acknowledged understanding and agreed with plan of care overall.     Medications Discontinued During This Encounter   Medication Reason    atorvastatin (LIPITOR) 40 MG tablet REORDER    amLODIPine (NORVASC) 10 MG tablet REORDER    furosemide (LASIX) 40 MG tablet REORDER    spironolactone (ALDACTONE) 25 MG tablet DOSE ADJUSTMENT    furosemide (LASIX) 80 MG tablet REORDER    insulin glargine (LANTUS;BASAGLAR) 100 UNIT/ML injection pen REORDER    lisinopril (PRINIVIL;ZESTRIL) 40 MG tablet REORDER    propranolol (INDERAL LA) 60 MG extended release capsule DOSE ADJUSTMENT    carvedilol (COREG) 6.25 MG tablet LIST CLEANUP        General information on medications:  -When it comes to medications, whether with starting or adding a new medication or increasing the dose of a current medication, the benefits and risks have to always be considered and weighed over, especially if one is taking other medications as well. -There are no medications that have no side effects and that there is always a risk involved with taking a medication.    -If a side effect were to occur with starting a new medication or with increasing the dose of a current medication that either the medication can be totally discontinued altogether or simply decrease the dose of it and if this would be the case a follow-up appointment would be deemed necessary.    -The drug allergy list will then be updated with the corresponding side effect(s) if it's deemed to be a true 'drug allergy'. -The most common adverse effects of medication(s) were addressed at today's visit.    -Lastly, the coverage status of a medication may vary from insurance to insurance and the only way to verify if the medication is covered is to send an actual prescription in.    -The drug formulary of each insurance changes without any warning or notification to the healthcare provider let alone the pharmacy.  -The cost of medications vary from insurance to insurance and the cost is always subject to change just like the drug formulary. Level of service (LOS):   -Duration: 60 minutes - I spent a significant amount of time discussing issues as noted above. Patient was given the opportunity to ask me any questions and address any concerns/issues. I also reviewed lab work (if available) as well as prior notes from PCP and/or other specialists if available.    -Multiple variables/factors are considered with determining LOS: duration of time spent with patient, time spent reviewing patient's labs, notes (including my own and other specialist's notes if relevant), number of issues addressed during the visit, answering any questions/concerns brought up by the patient, and the overall complexity and decision making regarding the issues addressed during the visit, etc.  -Length of visit is one factor (not the main one) in determining the LOS code selected. -There are different codes to distinguish new patient vs old (established) patient.  -There is a specific LOS/code for a (annual) physical (otherwise known as a wellness visit or biometric screening) can only be used once a year. In addition, the healthcare provider will determine whether the current visit can be considered a 'physical.' Discussing about specific issues/concerns, especially if those issues/concerns are new does not constitute as a 'physical' visit. Follow-up: Return in about 4 weeks (around 8/5/2021) for IP - starting GLP-1 agonist; diabetes. .     Patient was informed that if his or her symptoms worsen to follow up with me sooner or go to the nearest ER if the symptoms are very significant and warrant higher level of care. Regarding my note:  -This note was composed (by me only and not with assistance via a scribe) to the best of my knowledge and recollection of the encounter with the patient using one of my own customized note templates utilizing a combination of typing and dictating with the [address] [place] [address] speech recognition software. As a result, the note may possibly have various errors (e.g. spelling, grammar, and non-sensible words/phrases/statements) despite reviewing the note prior to signing it for completion.    -It is worth noting that most of the time, progress notes are completed usually long after the patient was seen. Every effort is made to have notes as well as other things that needed to be attended to (e.g. medication refills, MyChart messages, documents that require reviewing, etc.) be addressed and completed in a timely fashion (ideally within 72 hours of the patient encounter). -It is also worth noting that healthcare providers often see several patients a day (e.g. > 15-30 patients/day) in either the outpatient and/or inpatient setting(s).   In addition, healthcare providers spend a significant amount of time reviewing multiple patients' charts in preparation for their future encounters (pre-charting) as well as time spent reviewing any labs, imaging, specialist's notes (if relevant), and other documents (e.g. recent ER visits or hospital stays or completing forms such as FMLA, short-term/long-term disability), etc.  Time is also allocated to attending to patient's messages on mygall, phone calls from various people, attending to prescription refills/orders, and also attending meetings or conferences throughout the day. Time and effort is also allocated to coordinating with office staff to make sure various things are completed as part of the day-to-day office management responsibilities. For the most part, substantial portion of each given day is usually spent with direct patient care followed by documenting.  -Given all this, it is worth pointing out that not every detail of the encounter can be remembered and not everything discussed is considered relevant, significant, or noteworthy. It is also worth mentioning that my recollection of the visit may differ from the respective patient's recollection of the visit. This note is primarily written for myself (the healthcare provider) utilizing one of my own customized templates so I can recall what happened during that visit and may be used for future reference for myself to prepare for follow up appointments. My note is also written in mind for other healthcare professionals (who have their own extensive medical background and experience) for their own understanding (of what happened during the visit) and also more importantly to hopefully help influence and play a role in formulating their plan of care along with their own unique medical expertise. If one has any questions or concerns about my given note, please take into consideration all these aforementioned things before contacting. Damon Ambriz M.D.  [address] [place] [address]    Electronically signed by Zeyad Kay on 7/11/2021 at 2:57 PM.

## 2021-07-08 ENCOUNTER — OFFICE VISIT (OUTPATIENT)
Dept: FAMILY MEDICINE CLINIC | Age: 46
End: 2021-07-08
Payer: COMMERCIAL

## 2021-07-08 VITALS
TEMPERATURE: 97.8 F | SYSTOLIC BLOOD PRESSURE: 160 MMHG | DIASTOLIC BLOOD PRESSURE: 100 MMHG | HEART RATE: 97 BPM | HEIGHT: 66 IN | BODY MASS INDEX: 39.32 KG/M2 | OXYGEN SATURATION: 99 % | WEIGHT: 244.7 LBS

## 2021-07-08 DIAGNOSIS — E11.69 TYPE 2 DIABETES MELLITUS WITH OTHER SPECIFIED COMPLICATION, WITH LONG-TERM CURRENT USE OF INSULIN (HCC): ICD-10-CM

## 2021-07-08 DIAGNOSIS — H54.7 BLINDNESS DUE TO TYPE 2 DIABETES MELLITUS (HCC): Chronic | ICD-10-CM

## 2021-07-08 DIAGNOSIS — I10 ESSENTIAL HYPERTENSION: ICD-10-CM

## 2021-07-08 DIAGNOSIS — E78.2 MIXED HYPERLIPIDEMIA: ICD-10-CM

## 2021-07-08 DIAGNOSIS — Z79.4 TYPE 2 DIABETES MELLITUS WITH OTHER SPECIFIED COMPLICATION, WITH LONG-TERM CURRENT USE OF INSULIN (HCC): ICD-10-CM

## 2021-07-08 DIAGNOSIS — E11.39 BLINDNESS DUE TO TYPE 2 DIABETES MELLITUS (HCC): Chronic | ICD-10-CM

## 2021-07-08 DIAGNOSIS — F41.8 MIXED ANXIETY AND DEPRESSIVE DISORDER: ICD-10-CM

## 2021-07-08 DIAGNOSIS — I50.32 CHRONIC DIASTOLIC (CONGESTIVE) HEART FAILURE (HCC): ICD-10-CM

## 2021-07-08 DIAGNOSIS — N04.9 NEPHROTIC SYNDROME: ICD-10-CM

## 2021-07-08 DIAGNOSIS — Z76.89 ENCOUNTER TO ESTABLISH CARE WITH NEW DOCTOR: Primary | ICD-10-CM

## 2021-07-08 DIAGNOSIS — N18.31 STAGE 3A CHRONIC KIDNEY DISEASE (HCC): ICD-10-CM

## 2021-07-08 PROCEDURE — 4004F PT TOBACCO SCREEN RCVD TLK: CPT | Performed by: FAMILY MEDICINE

## 2021-07-08 PROCEDURE — 99205 OFFICE O/P NEW HI 60 MIN: CPT | Performed by: FAMILY MEDICINE

## 2021-07-08 PROCEDURE — 3052F HG A1C>EQUAL 8.0%<EQUAL 9.0%: CPT | Performed by: FAMILY MEDICINE

## 2021-07-08 PROCEDURE — 2022F DILAT RTA XM EVC RTNOPTHY: CPT | Performed by: FAMILY MEDICINE

## 2021-07-08 PROCEDURE — G8417 CALC BMI ABV UP PARAM F/U: HCPCS | Performed by: FAMILY MEDICINE

## 2021-07-08 PROCEDURE — G8427 DOCREV CUR MEDS BY ELIG CLIN: HCPCS | Performed by: FAMILY MEDICINE

## 2021-07-08 RX ORDER — ATORVASTATIN CALCIUM 40 MG/1
40 TABLET, FILM COATED ORAL NIGHTLY
Qty: 30 TABLET | Refills: 3 | Status: SHIPPED | OUTPATIENT
Start: 2021-07-08 | End: 2022-03-17 | Stop reason: SDUPTHER

## 2021-07-08 RX ORDER — LISINOPRIL 40 MG/1
40 TABLET ORAL DAILY
COMMUNITY
End: 2021-07-08 | Stop reason: SDUPTHER

## 2021-07-08 RX ORDER — OMEGA-3 FATTY ACIDS/FISH OIL 300-1000MG
1 CAPSULE ORAL
Status: ON HOLD | COMMUNITY
End: 2021-09-13 | Stop reason: HOSPADM

## 2021-07-08 RX ORDER — FUROSEMIDE 80 MG
80 TABLET ORAL EVERY MORNING
Status: ON HOLD | COMMUNITY
End: 2021-09-13 | Stop reason: HOSPADM

## 2021-07-08 RX ORDER — SPIRONOLACTONE 50 MG/1
50 TABLET, FILM COATED ORAL DAILY
Qty: 30 TABLET | Refills: 2 | Status: SHIPPED | OUTPATIENT
Start: 2021-07-08 | End: 2021-12-10

## 2021-07-08 RX ORDER — AMLODIPINE BESYLATE 10 MG/1
10 TABLET ORAL DAILY
Qty: 30 TABLET | Refills: 1 | Status: SHIPPED | OUTPATIENT
Start: 2021-07-08 | End: 2021-11-01

## 2021-07-08 RX ORDER — LISINOPRIL 40 MG/1
40 TABLET ORAL DAILY
Qty: 30 TABLET | Refills: 2 | Status: SHIPPED | OUTPATIENT
Start: 2021-07-08 | End: 2021-09-23

## 2021-07-08 RX ORDER — PROPRANOLOL HCL 60 MG
1 CAPSULE, EXTENDED RELEASE 24HR ORAL
COMMUNITY
End: 2021-07-08 | Stop reason: DRUGHIGH

## 2021-07-08 RX ORDER — PROPRANOLOL HYDROCHLORIDE 80 MG/1
80 CAPSULE, EXTENDED RELEASE ORAL EVERY MORNING
Qty: 30 CAPSULE | Refills: 2 | Status: SHIPPED | OUTPATIENT
Start: 2021-07-08 | End: 2021-11-01

## 2021-07-08 RX ORDER — FUROSEMIDE 80 MG
80 TABLET ORAL DAILY
Qty: 60 TABLET | Refills: 1 | Status: ON HOLD | OUTPATIENT
Start: 2021-07-08 | End: 2021-08-29

## 2021-07-08 RX ORDER — FUROSEMIDE 40 MG/1
40 TABLET ORAL EVERY EVENING
Qty: 30 TABLET | Refills: 1 | Status: ON HOLD
Start: 2021-07-08 | End: 2021-09-13 | Stop reason: HOSPADM

## 2021-07-08 ASSESSMENT — PATIENT HEALTH QUESTIONNAIRE - PHQ9
SUM OF ALL RESPONSES TO PHQ QUESTIONS 1-9: 1
SUM OF ALL RESPONSES TO PHQ9 QUESTIONS 1 & 2: 1
2. FEELING DOWN, DEPRESSED OR HOPELESS: 1
1. LITTLE INTEREST OR PLEASURE IN DOING THINGS: 0

## 2021-07-09 ENCOUNTER — TELEPHONE (OUTPATIENT)
Dept: FAMILY MEDICINE CLINIC | Age: 46
End: 2021-07-09

## 2021-07-11 PROBLEM — N04.9 NEPHROTIC SYNDROME: Chronic | Status: ACTIVE | Noted: 2020-04-25

## 2021-07-11 PROBLEM — E11.42 DIABETIC PERIPHERAL NEUROPATHY (HCC): Chronic | Status: ACTIVE | Noted: 2021-02-24

## 2021-07-11 ASSESSMENT — ENCOUNTER SYMPTOMS
NAUSEA: 0
CONSTIPATION: 0
BACK PAIN: 0
WHEEZING: 0
BLOOD IN STOOL: 0
RHINORRHEA: 0
ABDOMINAL PAIN: 0
SINUS PRESSURE: 0
VOMITING: 0
CHEST TIGHTNESS: 0
TROUBLE SWALLOWING: 0
COUGH: 0
SHORTNESS OF BREATH: 0
DIARRHEA: 0
ABDOMINAL DISTENTION: 0

## 2021-07-12 NOTE — TELEPHONE ENCOUNTER
PA submitted via [facility] for Trulicity 1.37LD/5.5AP pen-injectors.   Key: [id number] - PA Case ID: [id number] - Rx #: [id number]    STATUS: PENDING

## 2021-07-13 NOTE — TELEPHONE ENCOUNTER
Received APPROVAL for Trulicity 9.60QG/6.0OK pen-injectors from 07/12/2021 to 07/12/2022. Approval letter attached. Please advise patient. Thank you!

## 2021-08-27 ENCOUNTER — APPOINTMENT (OUTPATIENT)
Dept: CT IMAGING | Age: 46
DRG: 720 | End: 2021-08-27
Payer: COMMERCIAL

## 2021-08-27 ENCOUNTER — HOSPITAL ENCOUNTER (INPATIENT)
Age: 46
LOS: 17 days | Discharge: NURSING, REHAB OR LONG-TERM CARE | DRG: 720 | End: 2021-09-13
Attending: EMERGENCY MEDICINE | Admitting: INTERNAL MEDICINE
Payer: COMMERCIAL

## 2021-08-27 DIAGNOSIS — J18.9 PNEUMONIA DUE TO INFECTIOUS ORGANISM, UNSPECIFIED LATERALITY, UNSPECIFIED PART OF LUNG: Primary | ICD-10-CM

## 2021-08-27 DIAGNOSIS — J96.91 RESPIRATORY FAILURE WITH HYPOXIA, UNSPECIFIED CHRONICITY (HCC): ICD-10-CM

## 2021-08-27 DIAGNOSIS — G93.40 ENCEPHALOPATHY: ICD-10-CM

## 2021-08-27 PROBLEM — J96.00 ACUTE RESPIRATORY FAILURE (HCC): Status: ACTIVE | Noted: 2021-08-27

## 2021-08-27 LAB
A/G RATIO: 0.8 (ref 1.1–2.2)
ABO/RH: NORMAL
ACETAMINOPHEN LEVEL: <5 UG/ML (ref 10–30)
ALBUMIN SERPL-MCNC: 3 G/DL (ref 3.4–5)
ALP BLD-CCNC: 167 U/L (ref 40–129)
ALT SERPL-CCNC: 20 U/L (ref 10–40)
AMPHETAMINE SCREEN, URINE: NORMAL
ANION GAP SERPL CALCULATED.3IONS-SCNC: 12 MMOL/L (ref 3–16)
ANTIBODY SCREEN: NORMAL
APTT: 39 SEC (ref 26.2–38.6)
AST SERPL-CCNC: 37 U/L (ref 15–37)
BARBITURATE SCREEN URINE: NORMAL
BASE EXCESS ARTERIAL: -9.6 MMOL/L (ref -3–3)
BASOPHILS ABSOLUTE: 0.1 K/UL (ref 0–0.2)
BASOPHILS RELATIVE PERCENT: 0.6 %
BENZODIAZEPINE SCREEN, URINE: NORMAL
BILIRUB SERPL-MCNC: <0.2 MG/DL (ref 0–1)
BILIRUBIN URINE: NEGATIVE
BLOOD, URINE: ABNORMAL
BUN BLDV-MCNC: 37 MG/DL (ref 7–20)
CALCIUM SERPL-MCNC: 8.4 MG/DL (ref 8.3–10.6)
CANNABINOID SCREEN URINE: NORMAL
CARBOXYHEMOGLOBIN ARTERIAL: 3.2 % (ref 0–1.5)
CHLORIDE BLD-SCNC: 107 MMOL/L (ref 99–110)
CLARITY: CLEAR
CO2: 17 MMOL/L (ref 21–32)
COCAINE METABOLITE SCREEN URINE: NORMAL
COLOR: YELLOW
COMMENT UA: ABNORMAL
CREAT SERPL-MCNC: 4.6 MG/DL (ref 0.6–1.1)
EOSINOPHILS ABSOLUTE: 0.8 K/UL (ref 0–0.6)
EOSINOPHILS RELATIVE PERCENT: 3.8 %
EPITHELIAL CELLS, UA: 5 /HPF (ref 0–5)
GFR AFRICAN AMERICAN: 12
GFR NON-AFRICAN AMERICAN: 10
GLOBULIN: 3.7 G/DL
GLUCOSE BLD-MCNC: 141 MG/DL (ref 70–99)
GLUCOSE BLD-MCNC: 161 MG/DL (ref 70–99)
GLUCOSE URINE: 250 MG/DL
HCO3 ARTERIAL: 17.2 MMOL/L (ref 21–29)
HCT VFR BLD CALC: 30.2 % (ref 36–48)
HEMOGLOBIN, ART, EXTENDED: 8 G/DL (ref 12–16)
HEMOGLOBIN: 9.6 G/DL (ref 12–16)
HYALINE CASTS: 8 /LPF (ref 0–8)
INR BLD: 0.99 (ref 0.88–1.12)
KETONES, URINE: NEGATIVE MG/DL
LACTIC ACID, SEPSIS: 0.8 MMOL/L (ref 0.4–1.9)
LEUKOCYTE ESTERASE, URINE: NEGATIVE
LYMPHOCYTES ABSOLUTE: 2.2 K/UL (ref 1–5.1)
LYMPHOCYTES RELATIVE PERCENT: 10.3 %
Lab: NORMAL
MCH RBC QN AUTO: 28.5 PG (ref 26–34)
MCHC RBC AUTO-ENTMCNC: 31.8 G/DL (ref 31–36)
MCV RBC AUTO: 89.5 FL (ref 80–100)
METHADONE SCREEN, URINE: NORMAL
METHEMOGLOBIN ARTERIAL: 0.1 %
MICROSCOPIC EXAMINATION: YES
MONOCYTES ABSOLUTE: 1 K/UL (ref 0–1.3)
MONOCYTES RELATIVE PERCENT: 4.9 %
NEUTROPHILS ABSOLUTE: 16.9 K/UL (ref 1.7–7.7)
NEUTROPHILS RELATIVE PERCENT: 80.4 %
NITRITE, URINE: NEGATIVE
O2 SAT, ARTERIAL: 100 %
O2 THERAPY: ABNORMAL
OPIATE SCREEN URINE: NORMAL
OXYCODONE URINE: NORMAL
PCO2 ARTERIAL: 40.9 MMHG (ref 35–45)
PDW BLD-RTO: 16.3 % (ref 12.4–15.4)
PERFORMED ON: ABNORMAL
PH ARTERIAL: 7.23 (ref 7.35–7.45)
PH UA: 6
PH UA: 6 (ref 5–8)
PHENCYCLIDINE SCREEN URINE: NORMAL
PLATELET # BLD: 353 K/UL (ref 135–450)
PMV BLD AUTO: 8.5 FL (ref 5–10.5)
PO2 ARTERIAL: 243 MMHG (ref 75–108)
POTASSIUM REFLEX MAGNESIUM: 3.9 MMOL/L (ref 3.5–5.1)
PRO-BNP: ABNORMAL PG/ML (ref 0–124)
PROPOXYPHENE SCREEN: NORMAL
PROTEIN UA: >300 MG/DL
PROTHROMBIN TIME: 11.2 SEC (ref 9.9–12.7)
RBC # BLD: 3.37 M/UL (ref 4–5.2)
RBC UA: 3 /HPF (ref 0–4)
SALICYLATE, SERUM: <0.3 MG/DL (ref 15–30)
SODIUM BLD-SCNC: 136 MMOL/L (ref 136–145)
SPECIFIC GRAVITY UA: 1.01 (ref 1–1.03)
TCO2 ARTERIAL: 41.4 MMOL/L
TOTAL PROTEIN: 6.7 G/DL (ref 6.4–8.2)
TROPONIN: 0.24 NG/ML
URINE REFLEX TO CULTURE: ABNORMAL
URINE TYPE: ABNORMAL
UROBILINOGEN, URINE: 0.2 E.U./DL
WBC # BLD: 21 K/UL (ref 4–11)
WBC UA: 7 /HPF (ref 0–5)

## 2021-08-27 PROCEDURE — 85730 THROMBOPLASTIN TIME PARTIAL: CPT

## 2021-08-27 PROCEDURE — 81001 URINALYSIS AUTO W/SCOPE: CPT

## 2021-08-27 PROCEDURE — 84484 ASSAY OF TROPONIN QUANT: CPT

## 2021-08-27 PROCEDURE — 85025 COMPLETE CBC W/AUTO DIFF WBC: CPT

## 2021-08-27 PROCEDURE — U0003 INFECTIOUS AGENT DETECTION BY NUCLEIC ACID (DNA OR RNA); SEVERE ACUTE RESPIRATORY SYNDROME CORONAVIRUS 2 (SARS-COV-2) (CORONAVIRUS DISEASE [COVID-19]), AMPLIFIED PROBE TECHNIQUE, MAKING USE OF HIGH THROUGHPUT TECHNOLOGIES AS DESCRIBED BY CMS-2020-01-R: HCPCS

## 2021-08-27 PROCEDURE — 96374 THER/PROPH/DIAG INJ IV PUSH: CPT

## 2021-08-27 PROCEDURE — 2500000003 HC RX 250 WO HCPCS: Performed by: EMERGENCY MEDICINE

## 2021-08-27 PROCEDURE — 80143 DRUG ASSAY ACETAMINOPHEN: CPT

## 2021-08-27 PROCEDURE — 2580000003 HC RX 258: Performed by: EMERGENCY MEDICINE

## 2021-08-27 PROCEDURE — 80307 DRUG TEST PRSMV CHEM ANLYZR: CPT

## 2021-08-27 PROCEDURE — 80053 COMPREHEN METABOLIC PANEL: CPT

## 2021-08-27 PROCEDURE — 99283 EMERGENCY DEPT VISIT LOW MDM: CPT

## 2021-08-27 PROCEDURE — 51702 INSERT TEMP BLADDER CATH: CPT

## 2021-08-27 PROCEDURE — 70450 CT HEAD/BRAIN W/O DYE: CPT

## 2021-08-27 PROCEDURE — 0BH17EZ INSERTION OF ENDOTRACHEAL AIRWAY INTO TRACHEA, VIA NATURAL OR ARTIFICIAL OPENING: ICD-10-PCS | Performed by: EMERGENCY MEDICINE

## 2021-08-27 PROCEDURE — 83036 HEMOGLOBIN GLYCOSYLATED A1C: CPT

## 2021-08-27 PROCEDURE — 1200000000 HC SEMI PRIVATE

## 2021-08-27 PROCEDURE — 87150 DNA/RNA AMPLIFIED PROBE: CPT

## 2021-08-27 PROCEDURE — 36415 COLL VENOUS BLD VENIPUNCTURE: CPT

## 2021-08-27 PROCEDURE — 83880 ASSAY OF NATRIURETIC PEPTIDE: CPT

## 2021-08-27 PROCEDURE — 86850 RBC ANTIBODY SCREEN: CPT

## 2021-08-27 PROCEDURE — 5A1955Z RESPIRATORY VENTILATION, GREATER THAN 96 CONSECUTIVE HOURS: ICD-10-PCS | Performed by: EMERGENCY MEDICINE

## 2021-08-27 PROCEDURE — 31500 INSERT EMERGENCY AIRWAY: CPT

## 2021-08-27 PROCEDURE — 96365 THER/PROPH/DIAG IV INF INIT: CPT

## 2021-08-27 PROCEDURE — 2700000000 HC OXYGEN THERAPY PER DAY

## 2021-08-27 PROCEDURE — 87040 BLOOD CULTURE FOR BACTERIA: CPT

## 2021-08-27 PROCEDURE — 85610 PROTHROMBIN TIME: CPT

## 2021-08-27 PROCEDURE — 80179 DRUG ASSAY SALICYLATE: CPT

## 2021-08-27 PROCEDURE — 93005 ELECTROCARDIOGRAM TRACING: CPT | Performed by: EMERGENCY MEDICINE

## 2021-08-27 PROCEDURE — 6360000002 HC RX W HCPCS: Performed by: EMERGENCY MEDICINE

## 2021-08-27 PROCEDURE — 87449 NOS EACH ORGANISM AG IA: CPT

## 2021-08-27 PROCEDURE — 94761 N-INVAS EAR/PLS OXIMETRY MLT: CPT

## 2021-08-27 PROCEDURE — 6360000002 HC RX W HCPCS: Performed by: INTERNAL MEDICINE

## 2021-08-27 PROCEDURE — 94002 VENT MGMT INPAT INIT DAY: CPT

## 2021-08-27 PROCEDURE — 86900 BLOOD TYPING SEROLOGIC ABO: CPT

## 2021-08-27 PROCEDURE — 86901 BLOOD TYPING SEROLOGIC RH(D): CPT

## 2021-08-27 PROCEDURE — U0005 INFEC AGEN DETEC AMPLI PROBE: HCPCS

## 2021-08-27 PROCEDURE — 82803 BLOOD GASES ANY COMBINATION: CPT

## 2021-08-27 PROCEDURE — 83605 ASSAY OF LACTIC ACID: CPT

## 2021-08-27 PROCEDURE — 84703 CHORIONIC GONADOTROPIN ASSAY: CPT

## 2021-08-27 PROCEDURE — 71250 CT THORAX DX C-: CPT

## 2021-08-27 PROCEDURE — 6370000000 HC RX 637 (ALT 250 FOR IP)

## 2021-08-27 RX ORDER — SODIUM CHLORIDE 9 MG/ML
25 INJECTION, SOLUTION INTRAVENOUS PRN
Status: DISCONTINUED | OUTPATIENT
Start: 2021-08-27 | End: 2021-09-13 | Stop reason: HOSPADM

## 2021-08-27 RX ORDER — HEPARIN SODIUM 10000 [USP'U]/100ML
5-30 INJECTION, SOLUTION INTRAVENOUS CONTINUOUS
Status: DISCONTINUED | OUTPATIENT
Start: 2021-08-27 | End: 2021-08-31 | Stop reason: ALTCHOICE

## 2021-08-27 RX ORDER — ASPIRIN 300 MG/1
SUPPOSITORY RECTAL
Status: COMPLETED
Start: 2021-08-27 | End: 2021-08-27

## 2021-08-27 RX ORDER — HEPARIN SODIUM 1000 [USP'U]/ML
40 INJECTION, SOLUTION INTRAVENOUS; SUBCUTANEOUS PRN
Status: DISCONTINUED | OUTPATIENT
Start: 2021-08-27 | End: 2021-08-31 | Stop reason: ALTCHOICE

## 2021-08-27 RX ORDER — SODIUM CHLORIDE 0.9 % (FLUSH) 0.9 %
5-40 SYRINGE (ML) INJECTION PRN
Status: DISCONTINUED | OUTPATIENT
Start: 2021-08-27 | End: 2021-09-13 | Stop reason: HOSPADM

## 2021-08-27 RX ORDER — HYDRALAZINE HYDROCHLORIDE 20 MG/ML
10 INJECTION INTRAMUSCULAR; INTRAVENOUS ONCE
Status: COMPLETED | OUTPATIENT
Start: 2021-08-27 | End: 2021-08-27

## 2021-08-27 RX ORDER — SODIUM CHLORIDE 9 MG/ML
25 INJECTION, SOLUTION INTRAVENOUS PRN
Status: DISCONTINUED | OUTPATIENT
Start: 2021-08-27 | End: 2021-08-27 | Stop reason: SDUPTHER

## 2021-08-27 RX ORDER — ASPIRIN 300 MG/1
300 SUPPOSITORY RECTAL EVERY 6 HOURS PRN
Status: DISCONTINUED | OUTPATIENT
Start: 2021-08-27 | End: 2021-08-27

## 2021-08-27 RX ORDER — ACETAMINOPHEN 325 MG/1
650 TABLET ORAL EVERY 6 HOURS PRN
Status: DISCONTINUED | OUTPATIENT
Start: 2021-08-27 | End: 2021-09-13 | Stop reason: HOSPADM

## 2021-08-27 RX ORDER — FENTANYL CITRATE 50 UG/ML
50 INJECTION, SOLUTION INTRAMUSCULAR; INTRAVENOUS
Status: DISCONTINUED | OUTPATIENT
Start: 2021-08-27 | End: 2021-09-13 | Stop reason: HOSPADM

## 2021-08-27 RX ORDER — SODIUM CHLORIDE 0.9 % (FLUSH) 0.9 %
5-40 SYRINGE (ML) INJECTION EVERY 12 HOURS SCHEDULED
Status: DISCONTINUED | OUTPATIENT
Start: 2021-08-27 | End: 2021-08-27 | Stop reason: SDUPTHER

## 2021-08-27 RX ORDER — METOPROLOL TARTRATE 5 MG/5ML
2.5 INJECTION INTRAVENOUS ONCE
Status: DISCONTINUED | OUTPATIENT
Start: 2021-08-27 | End: 2021-08-27

## 2021-08-27 RX ORDER — HEPARIN SODIUM 1000 [USP'U]/ML
80 INJECTION, SOLUTION INTRAVENOUS; SUBCUTANEOUS PRN
Status: DISCONTINUED | OUTPATIENT
Start: 2021-08-27 | End: 2021-08-31 | Stop reason: ALTCHOICE

## 2021-08-27 RX ORDER — ROCURONIUM BROMIDE 10 MG/ML
100 INJECTION, SOLUTION INTRAVENOUS ONCE
Status: COMPLETED | OUTPATIENT
Start: 2021-08-27 | End: 2021-08-27

## 2021-08-27 RX ORDER — LINEZOLID 2 MG/ML
600 INJECTION, SOLUTION INTRAVENOUS ONCE
Status: COMPLETED | OUTPATIENT
Start: 2021-08-27 | End: 2021-08-27

## 2021-08-27 RX ORDER — HEPARIN SODIUM 1000 [USP'U]/ML
80 INJECTION, SOLUTION INTRAVENOUS; SUBCUTANEOUS ONCE
Status: COMPLETED | OUTPATIENT
Start: 2021-08-27 | End: 2021-08-27

## 2021-08-27 RX ORDER — SODIUM CHLORIDE 0.9 % (FLUSH) 0.9 %
5-40 SYRINGE (ML) INJECTION PRN
Status: DISCONTINUED | OUTPATIENT
Start: 2021-08-27 | End: 2021-08-27 | Stop reason: SDUPTHER

## 2021-08-27 RX ORDER — ONDANSETRON 4 MG/1
4 TABLET, ORALLY DISINTEGRATING ORAL EVERY 8 HOURS PRN
Status: DISCONTINUED | OUTPATIENT
Start: 2021-08-27 | End: 2021-09-13 | Stop reason: HOSPADM

## 2021-08-27 RX ORDER — ONDANSETRON 2 MG/ML
4 INJECTION INTRAMUSCULAR; INTRAVENOUS EVERY 6 HOURS PRN
Status: DISCONTINUED | OUTPATIENT
Start: 2021-08-27 | End: 2021-09-13 | Stop reason: HOSPADM

## 2021-08-27 RX ORDER — LINEZOLID 2 MG/ML
600 INJECTION, SOLUTION INTRAVENOUS EVERY 12 HOURS
Status: DISCONTINUED | OUTPATIENT
Start: 2021-08-28 | End: 2021-08-30 | Stop reason: ALTCHOICE

## 2021-08-27 RX ORDER — 0.9 % SODIUM CHLORIDE 0.9 %
1000 INTRAVENOUS SOLUTION INTRAVENOUS ONCE
Status: DISCONTINUED | OUTPATIENT
Start: 2021-08-27 | End: 2021-08-27

## 2021-08-27 RX ORDER — PROPOFOL 10 MG/ML
5-50 INJECTION, EMULSION INTRAVENOUS
Status: DISCONTINUED | OUTPATIENT
Start: 2021-08-27 | End: 2021-09-05

## 2021-08-27 RX ORDER — HYDRALAZINE HYDROCHLORIDE 20 MG/ML
10 INJECTION INTRAMUSCULAR; INTRAVENOUS EVERY 4 HOURS PRN
Status: COMPLETED | OUTPATIENT
Start: 2021-08-27 | End: 2021-09-05

## 2021-08-27 RX ORDER — ASPIRIN 300 MG/1
300 SUPPOSITORY RECTAL ONCE
Status: COMPLETED | OUTPATIENT
Start: 2021-08-27 | End: 2021-08-27

## 2021-08-27 RX ORDER — POLYETHYLENE GLYCOL 3350 17 G/17G
17 POWDER, FOR SOLUTION ORAL DAILY PRN
Status: DISCONTINUED | OUTPATIENT
Start: 2021-08-27 | End: 2021-09-13 | Stop reason: HOSPADM

## 2021-08-27 RX ORDER — ASPIRIN 81 MG/1
324 TABLET, CHEWABLE ORAL ONCE
Status: DISCONTINUED | OUTPATIENT
Start: 2021-08-27 | End: 2021-08-27

## 2021-08-27 RX ORDER — NALOXONE HYDROCHLORIDE 1 MG/ML
2 INJECTION INTRAMUSCULAR; INTRAVENOUS; SUBCUTANEOUS ONCE
Status: COMPLETED | OUTPATIENT
Start: 2021-08-27 | End: 2021-08-27

## 2021-08-27 RX ORDER — SODIUM CHLORIDE 0.9 % (FLUSH) 0.9 %
5-40 SYRINGE (ML) INJECTION EVERY 12 HOURS SCHEDULED
Status: DISCONTINUED | OUTPATIENT
Start: 2021-08-27 | End: 2021-09-13 | Stop reason: HOSPADM

## 2021-08-27 RX ORDER — ETOMIDATE 2 MG/ML
20 INJECTION INTRAVENOUS ONCE
Status: COMPLETED | OUTPATIENT
Start: 2021-08-27 | End: 2021-08-27

## 2021-08-27 RX ORDER — ACETAMINOPHEN 650 MG/1
650 SUPPOSITORY RECTAL EVERY 6 HOURS PRN
Status: DISCONTINUED | OUTPATIENT
Start: 2021-08-27 | End: 2021-09-13 | Stop reason: HOSPADM

## 2021-08-27 RX ADMIN — LINEZOLID 600 MG: 600 INJECTION, SOLUTION INTRAVENOUS at 20:00

## 2021-08-27 RX ADMIN — PROPOFOL 20 MCG/KG/MIN: 10 INJECTION, EMULSION INTRAVENOUS at 19:38

## 2021-08-27 RX ADMIN — HEPARIN SODIUM 18 UNITS/KG/HR: 10000 INJECTION, SOLUTION INTRAVENOUS at 22:06

## 2021-08-27 RX ADMIN — ETOMIDATE 20 MG: 20 INJECTION, SOLUTION INTRAVENOUS at 18:56

## 2021-08-27 RX ADMIN — ASPIRIN 300 MG: 300 SUPPOSITORY RECTAL at 22:07

## 2021-08-27 RX ADMIN — HYDRALAZINE HYDROCHLORIDE 10 MG: 20 INJECTION, SOLUTION INTRAMUSCULAR; INTRAVENOUS at 21:52

## 2021-08-27 RX ADMIN — HEPARIN SODIUM 8890 UNITS: 1000 INJECTION INTRAVENOUS; SUBCUTANEOUS at 22:00

## 2021-08-27 RX ADMIN — ROCURONIUM BROMIDE 100 MG: 10 INJECTION, SOLUTION INTRAVENOUS at 18:57

## 2021-08-27 RX ADMIN — CEFEPIME HYDROCHLORIDE 2000 MG: 2 INJECTION, POWDER, FOR SOLUTION INTRAVENOUS at 21:14

## 2021-08-27 RX ADMIN — NALOXONE HYDROCHLORIDE 2 MG: 1 INJECTION PARENTERAL at 18:50

## 2021-08-27 ASSESSMENT — PULMONARY FUNCTION TESTS
PIF_VALUE: 32
PIF_VALUE: 28

## 2021-08-27 ASSESSMENT — PAIN SCALES - GENERAL: PAINLEVEL_OUTOF10: 0

## 2021-08-27 NOTE — ED PROVIDER NOTES
[address] PROVIDER NOTE    Patient Identification  Pt Name: Mik Cruz  MRN: [id number]  Davidgfkelsie 1975  Date of evaluation: 8/27/2021  Provider: Carla Song MD  PCP: Dolye Angel    Chief Complaint  Resp distress    HPI  History provided by patients   This is a 55 y.o. female who presents to the ED for respiratory distress. 3 hours prior to arrival, patient was sitting in bed and started having dyspnea. Was not having any chest pain. Was not having any pain in general.  No headache. No fevers. Was in her normal state of health. Was feeling weak but often feels weak. No coughing. Thought that she was having a panic attack. She progressively started becoming blue in the lips per her  and called EMS. Per EMS, she was saturating in the 60s on arrival.  She does have history of blindness in both eyes. No history of trauma. AMIE grigsby    I have reviewed the following nursing documentation:  Allergies: Amoxicillin, Levofloxacin, Levofloxacin, Vancomycin, and Tape [adhesive tape]    Past medical history:   Past Medical History:   Diagnosis Date    Blind in both eyes     Cerebral artery occlusion with cerebral infarction ([address]     CHF (congestive heart failure) ([address]     Chronic kidney disease     Depression     Diabetes mellitus out of control ([address]     Diabetes mellitus, type II ([address]     2005    Diabetic neuropathy associated with type 2 diabetes mellitus ([address]     Generalized headaches     Hypertension     Infertility     Insomnia     chronic vs lack of time spent to sleep    Migraine headache 11/09/2011    Mixed hyperlipidemia     Otitis media     h/o recurrent    Pelvic abscess in female 10/05/2013    Pneumonia     2004 approx.     Stroke [facility]) 08/27/2020     Past surgical history:   Past Surgical History:   Procedure Laterality Date    CERVIX SURGERY      laser tx for dysplasia;1992    EYE SURGERY      FOOT SURGERY Right     FOOT SURGERY Bilateral     FOOT SURGERY Left        Home medications:   Previous Medications    AMLODIPINE (NORVASC) 10 MG TABLET    Take 1 tablet by mouth daily    ASPIRIN 81 MG EC TABLET    Take 1 tablet by mouth daily    ATORVASTATIN (LIPITOR) 40 MG TABLET    Take 1 tablet by mouth nightly    DULAGLUTIDE 0.75 MG/0.5ML SOPN    Inject 0.75 mg into the skin once a week    FUROSEMIDE (LASIX) 40 MG TABLET    Take 1 tablet by mouth every evening    FUROSEMIDE (LASIX) 80 MG TABLET    Take 80 mg by mouth every morning 80mg in the morning 40mg in the evening    FUROSEMIDE (LASIX) 80 MG TABLET    Take 1 tablet by mouth daily    GLUCOSE BLOOD VI TEST STRIPS (VICTORY AGM-4000 TEST) STRIP    Test four times daily until controlled and then three times daily. Code 250.02  ONE TOUCH ULTRA MONITOR    GLUCOSE MONITORING KIT (FREESTYLE) MONITORING KIT    1 kit by Does not apply route daily    IBUPROFEN (ADVIL;MOTRIN) 200 MG CAPS    Take 1 capsule by mouth    INSULIN GLARGINE (LANTUS;BASAGLAR) 100 UNIT/ML INJECTION PEN    Inject 30 Units into the skin daily    INSULIN LISPRO, 1 UNIT DIAL, 100 UNIT/ML SOPN    Inject 0-6 Units into the skin 3 times daily (with meals) **Corrective Low Dose Algorithm**  Glucose: Dose:               No Insulin  140-199 1 Unit  200-249 2 Units  250-299 3 Units  300-349 4 Units  350-399 5 Units  Over 399 6 Units    INSULIN PEN NEEDLE 29G X 12.7MM MISC    1 each by Does not apply route daily    LISINOPRIL (PRINIVIL;ZESTRIL) 40 MG TABLET    Take 1 tablet by mouth daily    LORAZEPAM (ATIVAN) 0.5 MG TABLET    Take 0.5 mg by mouth 2 times daily as needed for Anxiety. NICOTINE (NICODERM CQ) 21 MG/24HR    Place 1 patch onto the skin daily    PREGABALIN (LYRICA) 75 MG CAPSULE    Take 1 capsule by mouth nightly for 7 days.     PROPRANOLOL (INDERAL LA) 80 MG EXTENDED RELEASE CAPSULE    Take 1 capsule by mouth every morning    SERTRALINE (ZOLOFT) 50 MG TABLET    Take 1 tablet by mouth daily SPIRONOLACTONE (ALDACTONE) 50 MG TABLET    Take 1 tablet by mouth daily       Social history:  reports that she has been smoking cigarettes. She has been smoking about 1.00 pack per day. She has never used smokeless tobacco. She reports that she does not drink alcohol and does not use drugs. Family history:    Family History   Problem Relation Age of Onset    Diabetes Mother    [employer] Other Mother 79        Covid    Diabetes Father     High Blood Pressure Father     Colon Cancer Father     Diabetes Sister     Alcohol Abuse Maternal Grandfather     Diabetes Paternal Grandmother     Alcohol Abuse Paternal Grandfather     Diabetes Paternal Aunt     Diabetes Paternal Uncle          Exam  ED Triage Vitals [08/27/21 1854]   BP Temp Temp src Pulse Resp SpO2 Height Weight   (!) 197/100 -- -- 100 24 100 % -- 245 lb (111.1 kg)     Nursing note and vitals reviewed. Constitutional: Toxic  HENT:      Head: Normocephalic      Ears: External ears normal.      Nose: Nose normal.     Mouth: Membrane mucosa dry  Eyes: No discharge. Neck: Supple. Trachea midline. Cardiovascular: Regular rate. Cool extremities  Pulmonary/Chest: Apnea   Abdominal: Soft. No distension. Nontender  : Deferred  Rectal: Deferred   Musculoskeletal: . No gross deformity. Neurological: Pupils 4 mm bilaterally and fixed. No response to painful stimulus. Limp tone in all extremities  Skin: Warm and dry. Petechiae on legs  Psychiatric: Normal mood and affect. Behavior is normal.    Procedures      EKG  The Ekg interpreted by me in the absence of a cardiologist shows. Normal sinus rhythm. No specific ST changes appreciated.   HR 86  No t wave inversions  No significant change from prior EKG dated 2/21        Radiology  CT CHEST WO CONTRAST   Final Result      CT HEAD WO CONTRAST   Final Result          Labs  Results for orders placed or performed during the hospital encounter of 08/27/21   Lactate, Sepsis   Result Value Ref Range    Lactic Acid, Sepsis 0.8 0.4 - 1.9 mmol/L   CBC Auto Differential   Result Value Ref Range    WBC 21.0 (H) 4.0 - 11.0 K/uL    RBC 3.37 (L) 4.00 - 5.20 M/uL    Hemoglobin 9.6 (L) 12.0 - 16.0 g/dL    Hematocrit 30.2 (L) 36.0 - 48.0 %    MCV 89.5 80.0 - 100.0 fL    MCH 28.5 26.0 - 34.0 pg    MCHC 31.8 31.0 - 36.0 g/dL    RDW 16.3 (H) 12.4 - 15.4 %    Platelets 084 558 - 380 K/uL    MPV 8.5 5.0 - 10.5 fL    Neutrophils % 80.4 %    Lymphocytes % 10.3 %    Monocytes % 4.9 %    Eosinophils % 3.8 %    Basophils % 0.6 %    Neutrophils Absolute 16.9 (H) 1.7 - 7.7 K/uL    Lymphocytes Absolute 2.2 1.0 - 5.1 K/uL    Monocytes Absolute 1.0 0.0 - 1.3 K/uL    Eosinophils Absolute 0.8 (H) 0.0 - 0.6 K/uL    Basophils Absolute 0.1 0.0 - 0.2 K/uL   Comprehensive Metabolic Panel w/ Reflex to MG   Result Value Ref Range    Sodium 136 136 - 145 mmol/L    Potassium reflex Magnesium 3.9 3.5 - 5.1 mmol/L    Chloride 107 99 - 110 mmol/L    CO2 17 (L) 21 - 32 mmol/L    Anion Gap 12 3 - 16    Glucose 161 (H) 70 - 99 mg/dL    BUN 37 (H) 7 - 20 mg/dL    CREATININE 4.6 (H) 0.6 - 1.1 mg/dL    GFR Non-African American 10 (A) >60    GFR  12 (A) >60    Calcium 8.4 8.3 - 10.6 mg/dL    Total Protein 6.7 6.4 - 8.2 g/dL    Albumin 3.0 (L) 3.4 - 5.0 g/dL    Albumin/Globulin Ratio 0.8 (L) 1.1 - 2.2    Total Bilirubin <0.2 0.0 - 1.0 mg/dL    Alkaline Phosphatase 167 (H) 40 - 129 U/L    ALT 20 10 - 40 U/L    AST 37 15 - 37 U/L    Globulin 3.7 g/dL   Troponin   Result Value Ref Range    Troponin 0.24 (H) <0.01 ng/mL   Brain Natriuretic Peptide   Result Value Ref Range    Pro-BNP 31,445 (H) 0 - 124 pg/mL   Protime-INR   Result Value Ref Range    Protime 11.2 9.9 - 12.7 sec    INR 0.99 0.88 - 1.12   Drug screen multi urine   Result Value Ref Range    Amphetamine Screen, Urine Neg Negative <1000ng/mL    Barbiturate Screen, Ur Neg Negative <200 ng/mL    Benzodiazepine Screen, Urine Neg Negative <200 ng/mL    Cannabinoid Scrn, Ur Neg Negative <50 ng/mL    Cocaine Metabolite Screen, Urine Neg Negative <300 ng/mL    Opiate Scrn, Ur Neg Negative <300 ng/mL    PCP Screen, Urine Neg Negative <25 ng/mL    Methadone Screen, Urine Neg Negative <300 ng/mL    Propoxyphene Scrn, Ur Neg Negative <300 ng/mL    Oxycodone Urine Neg Negative <100 ng/ml    pH, UA 6.0     Drug Screen Comment: see below    Salicylate   Result Value Ref Range    Salicylate, Serum <2.8 (L) 15.0 - 30.0 mg/dL   Acetaminophen Level   Result Value Ref Range    Acetaminophen Level <5 (L) 10 - 30 ug/mL   Blood Gas, Arterial   Result Value Ref Range    pH, Arterial 7.233 (L) 7.350 - 7.450    pCO2, Arterial 40.9 35.0 - 45.0 mmHg    pO2, Arterial 243.0 (H) 75.0 - 108.0 mmHg    HCO3, Arterial 17.2 (L) 21.0 - 29.0 mmol/L    Base Excess, Arterial -9.6 (L) -3.0 - 3.0 mmol/L    Hemoglobin, Art, Extended 8.0 (L) 12.0 - 16.0 g/dL    O2 Sat, Arterial 100.0 >92 %    Carboxyhgb, Arterial 3.2 (H) 0.0 - 1.5 %    Methemoglobin, Arterial 0.1 <1.5 %    TCO2, Arterial 41.4 Not Established mmol/L    O2 Therapy Unknown    Urinalysis Reflex to Culture    Specimen: Urine, clean catch   Result Value Ref Range    Color, UA YELLOW Straw/Yellow    Clarity, UA Clear Clear    Glucose, Ur 250 (A) Negative mg/dL    Bilirubin Urine Negative Negative    Ketones, Urine Negative Negative mg/dL    Specific Gravity, UA 1.013 1.005 - 1.030    Blood, Urine SMALL (A) Negative    pH, UA 6.0 5.0 - 8.0    Protein, UA >300 Negative mg/dL    Urobilinogen, Urine 0.2 <2.0 E.U./dL    Nitrite, Urine Negative Negative    Leukocyte Esterase, Urine Negative Negative    Microscopic Examination YES     Urine Type Voided     Urine Reflex to Culture Not Indicated    Microscopic Urinalysis   Result Value Ref Range    Urinalysis Comments see below     Hyaline Casts, UA 8 0 - 8 /LPF    WBC, UA 7 (H) 0 - 5 /HPF    RBC, UA 3 0 - 4 /HPF    Epithelial Cells, UA 5 0 - 5 /HPF   APTT   Result Value Ref Range    aPTT 39.0 (H) 26.2 - 38.6 sec   POCT Glucose   Result Value Ref Range POC Glucose 141 (H) 70 - 99 mg/dl    Performed on ACCU-CHEK    EKG 12 Lead   Result Value Ref Range    Ventricular Rate 86 BPM    Atrial Rate 86 BPM    P-R Interval 122 ms    QRS Duration 90 ms    Q-T Interval 394 ms    QTc Calculation (Bazett) 471 ms    P Axis 62 degrees    R Axis 74 degrees    T Axis 127 degrees    Diagnosis       Normal sinus rhythmNonspecific T wave abnormalityProlonged QTAbnormal ECG       Screenings           MDM and ED Course  This is a 55 y.o. female who presents to the ED for altered mental status, respiratory distress. Unclear etiology. Significantly hypertensive on arrival.  Intubated for airway protection and apnea with hypoxia. Does have history of COPD however lungs do not sound tight. ACS is on differential.  Does have rash on her legs that appears petechial in nature. Meningitis in differential.  Will give ceftriaxone and linezolid since she has history of anaphylaxis to vancomycin. Obtaining CT head to evaluate for intracranial bleed. Was not having strokelike symptoms prior to this occurring. Obtaining CT chest to evaluate for pneumonia/fluid overload.      -------    CT chest shows multifocal pneumonia/pulmonary edema. Story is less consistent with pneumonia. While we are treating with broad-spectrum antibiotics, I have a feeling that there could be a pulmonary embolism since had no infectious symptoms. Hospitalist at bedside performed a limited echo which showed potential right heart strain supporting this. Due to significant KINZA on CKD, I think risk of contrast outweighs benefit. We are empirically treating with heparin after finding that head CT showed no intracranial bleed. Patient admitted to hospitalist service. My metoprolol order was changed to hydralazine. Troponin is elevated. Without chest pain, lower suspicion for ACS. Is possible that troponin is elevated either from pulmonary embolism versus KINZA. Regardless, we are treating with heparin. ---------    Intubation    Date/Time: 8/27/2021 9:58 PM  Performed by: Guerita Harkins MD  Authorized by: Guerita Harkins MD     Consent:     Consent obtained:  Emergent situation  Pre-procedure details:     Patient status:  Unresponsive    Paralytics:  Rocuronium  Procedure details:     Preoxygenation:  Bag valve mask    CPR in progress: no      Intubation method:  Oral    Oral intubation technique:  Video-assisted    Tube size (mm):  7.5    Tube type:  Cuffed    Number of attempts:  1    Ventilation between attempts: no      Cricoid pressure: no      Tube visualized through cords: yes    Placement assessment:     ETT to lip:  25    Tube secured with:  ETT gutierres    Placement verification: chest rise, direct visualization, equal breath sounds and ETCO2 detector      CXR findings:  ETT in proper place  Post-procedure details:     Patient tolerance of procedure: Tolerated well, no immediate complications          The total critical care time spent while evaluating and treating this patient was at least 45 minutes. This excludes time spent doing separately billable procedures. This includes time at the bedside, data interpretation, medication management, obtaining critical history from collateral sources if the patient is unable to provide it directly, and physician consultation. Specifics of interventions taken and potentially life-threatening diagnostic considerations are listed above in the medical decision making. [unfilled]    Final Impression  1. Pneumonia due to infectious organism, unspecified laterality, unspecified part of lung    2. Respiratory failure with hypoxia, unspecified chronicity ([facility]    3. Encephalopathy        Blood pressure (!) 192/103, pulse 82, resp. rate 16, weight 245 lb (111.1 kg), SpO2 100 %. Disposition:  DISPOSITION Admitted 08/27/2021 09:28:11 PM      Patient Referrals:  No follow-up provider specified.     Discharge Medications:  New Prescriptions    No medications on file       Discontinued Medications:  Discontinued Medications    No medications on file       This chart was generated using the [address] [place] [address] dictation system. I created this record but it may contain dictation errors given the limitations of this technology.         Eyal Nowak MD  08/27/21 3332

## 2021-08-27 NOTE — PROGRESS NOTES
08/27/21 1948   Patient Transport   Time spent transporting 1-15   Transport ventillation type Transport vent   Transport from ER   Transport destination CT scan   Transport destination ER   Emergency equipment included Yes

## 2021-08-27 NOTE — PROGRESS NOTES
RT called in to the room for intubation procedure. This RT assisted Dr. Sabi Hamilton for intubation. Intubated with 7.5 ETT. 24 Lip. Good color change noted on colormetric endtidal. Bilateral BS heard. Placed on mechanical ventilator. Tolerating well. Will monitor.

## 2021-08-28 ENCOUNTER — APPOINTMENT (OUTPATIENT)
Dept: GENERAL RADIOLOGY | Age: 46
DRG: 720 | End: 2021-08-28
Payer: COMMERCIAL

## 2021-08-28 LAB
A/G RATIO: 0.8 (ref 1.1–2.2)
ALBUMIN SERPL-MCNC: 2 G/DL (ref 3.4–5)
ALP BLD-CCNC: 107 U/L (ref 40–129)
ALT SERPL-CCNC: 14 U/L (ref 10–40)
AMPHETAMINE SCREEN, URINE: ABNORMAL
ANION GAP SERPL CALCULATED.3IONS-SCNC: 14 MMOL/L (ref 3–16)
APTT: 110.6 SEC (ref 26.2–38.6)
APTT: 34.9 SEC (ref 26.2–38.6)
APTT: 57.5 SEC (ref 26.2–38.6)
APTT: >248 SEC (ref 26.2–38.6)
AST SERPL-CCNC: 21 U/L (ref 15–37)
BARBITURATE SCREEN URINE: ABNORMAL
BASOPHILS ABSOLUTE: 0.1 K/UL (ref 0–0.2)
BASOPHILS RELATIVE PERCENT: 0.5 %
BENZODIAZEPINE SCREEN, URINE: POSITIVE
BILIRUB SERPL-MCNC: <0.2 MG/DL (ref 0–1)
BUN BLDV-MCNC: 40 MG/DL (ref 7–20)
CALCIUM IONIZED: 1.07 MMOL/L (ref 1.12–1.32)
CALCIUM SERPL-MCNC: 7.6 MG/DL (ref 8.3–10.6)
CANNABINOID SCREEN URINE: ABNORMAL
CHLORIDE BLD-SCNC: 108 MMOL/L (ref 99–110)
CO2: 16 MMOL/L (ref 21–32)
COCAINE METABOLITE SCREEN URINE: ABNORMAL
CREAT SERPL-MCNC: 4.8 MG/DL (ref 0.6–1.1)
CREATININE URINE: 50.9 MG/DL (ref 28–259)
D DIMER: 806 NG/ML DDU (ref 0–229)
EKG ATRIAL RATE: 86 BPM
EKG DIAGNOSIS: NORMAL
EKG P AXIS: 62 DEGREES
EKG P-R INTERVAL: 122 MS
EKG Q-T INTERVAL: 394 MS
EKG QRS DURATION: 90 MS
EKG QTC CALCULATION (BAZETT): 471 MS
EKG R AXIS: 74 DEGREES
EKG T AXIS: 127 DEGREES
EKG VENTRICULAR RATE: 86 BPM
EOSINOPHILS ABSOLUTE: 0.2 K/UL (ref 0–0.6)
EOSINOPHILS RELATIVE PERCENT: 1.6 %
ESTIMATED AVERAGE GLUCOSE: 116.9 MG/DL
FERRITIN: 112 NG/ML (ref 15–150)
GFR AFRICAN AMERICAN: 12
GFR NON-AFRICAN AMERICAN: 10
GLOBULIN: 2.6 G/DL
GLUCOSE BLD-MCNC: 105 MG/DL (ref 70–99)
GLUCOSE BLD-MCNC: 107 MG/DL (ref 70–99)
GLUCOSE BLD-MCNC: 110 MG/DL (ref 70–99)
GLUCOSE BLD-MCNC: 114 MG/DL (ref 70–99)
GLUCOSE BLD-MCNC: 86 MG/DL (ref 70–99)
HBA1C MFR BLD: 5.7 %
HCG QUALITATIVE: NEGATIVE
HCT VFR BLD CALC: 24.3 % (ref 36–48)
HCT VFR BLD CALC: 25.5 % (ref 36–48)
HEMOGLOBIN: 8 G/DL (ref 12–16)
HEMOGLOBIN: 8.4 G/DL (ref 12–16)
IRON SATURATION: 12 % (ref 15–50)
IRON: 22 UG/DL (ref 37–145)
LACTIC ACID: 0.8 MMOL/L (ref 0.4–2)
LYMPHOCYTES ABSOLUTE: 2.3 K/UL (ref 1–5.1)
LYMPHOCYTES RELATIVE PERCENT: 15.6 %
Lab: ABNORMAL
MAGNESIUM: 1.7 MG/DL (ref 1.8–2.4)
MCH RBC QN AUTO: 29 PG (ref 26–34)
MCHC RBC AUTO-ENTMCNC: 32.8 G/DL (ref 31–36)
MCV RBC AUTO: 88.2 FL (ref 80–100)
METHADONE SCREEN, URINE: ABNORMAL
MICROALBUMIN UR-MCNC: 268.5 MG/DL
MICROALBUMIN/CREAT UR-RTO: 5275 MG/G (ref 0–30)
MONOCYTES ABSOLUTE: 0.6 K/UL (ref 0–1.3)
MONOCYTES RELATIVE PERCENT: 3.9 %
NEUTROPHILS ABSOLUTE: 11.5 K/UL (ref 1.7–7.7)
NEUTROPHILS RELATIVE PERCENT: 78.4 %
OPIATE SCREEN URINE: ABNORMAL
OXYCODONE URINE: ABNORMAL
PDW BLD-RTO: 15.8 % (ref 12.4–15.4)
PERFORMED ON: ABNORMAL
PERFORMED ON: NORMAL
PH UA: 6
PH VENOUS: 7.37 (ref 7.35–7.45)
PHENCYCLIDINE SCREEN URINE: ABNORMAL
PHOSPHORUS: 5.1 MG/DL (ref 2.5–4.9)
PLATELET # BLD: 210 K/UL (ref 135–450)
PMV BLD AUTO: 8.7 FL (ref 5–10.5)
POTASSIUM REFLEX MAGNESIUM: 3.3 MMOL/L (ref 3.5–5.1)
PROCALCITONIN: 0.86 NG/ML (ref 0–0.15)
PROPOXYPHENE SCREEN: ABNORMAL
RBC # BLD: 2.89 M/UL (ref 4–5.2)
REASON FOR REJECTION: NORMAL
REJECTED TEST: NORMAL
REPORT: NORMAL
SARS-COV-2, PCR: NOT DETECTED
SODIUM BLD-SCNC: 138 MMOL/L (ref 136–145)
TOTAL CK: 172 U/L (ref 26–192)
TOTAL IRON BINDING CAPACITY: 184 UG/DL (ref 260–445)
TOTAL PROTEIN: 4.6 G/DL (ref 6.4–8.2)
TROPONIN: 0.22 NG/ML
TROPONIN: 0.23 NG/ML
WBC # BLD: 14.7 K/UL (ref 4–11)

## 2021-08-28 PROCEDURE — 83550 IRON BINDING TEST: CPT

## 2021-08-28 PROCEDURE — 71045 X-RAY EXAM CHEST 1 VIEW: CPT

## 2021-08-28 PROCEDURE — 2700000000 HC OXYGEN THERAPY PER DAY

## 2021-08-28 PROCEDURE — 2000000000 HC ICU R&B

## 2021-08-28 PROCEDURE — 84484 ASSAY OF TROPONIN QUANT: CPT

## 2021-08-28 PROCEDURE — 2100000000 HC CCU R&B

## 2021-08-28 PROCEDURE — 85379 FIBRIN DEGRADATION QUANT: CPT

## 2021-08-28 PROCEDURE — 82043 UR ALBUMIN QUANTITATIVE: CPT

## 2021-08-28 PROCEDURE — 84100 ASSAY OF PHOSPHORUS: CPT

## 2021-08-28 PROCEDURE — 6360000002 HC RX W HCPCS: Performed by: INTERNAL MEDICINE

## 2021-08-28 PROCEDURE — 85025 COMPLETE CBC W/AUTO DIFF WBC: CPT

## 2021-08-28 PROCEDURE — 99291 CRITICAL CARE FIRST HOUR: CPT | Performed by: INTERNAL MEDICINE

## 2021-08-28 PROCEDURE — 87070 CULTURE OTHR SPECIMN AEROBIC: CPT

## 2021-08-28 PROCEDURE — 2580000003 HC RX 258: Performed by: INTERNAL MEDICINE

## 2021-08-28 PROCEDURE — 85018 HEMOGLOBIN: CPT

## 2021-08-28 PROCEDURE — 83540 ASSAY OF IRON: CPT

## 2021-08-28 PROCEDURE — 84145 PROCALCITONIN (PCT): CPT

## 2021-08-28 PROCEDURE — 85730 THROMBOPLASTIN TIME PARTIAL: CPT

## 2021-08-28 PROCEDURE — 93010 ELECTROCARDIOGRAM REPORT: CPT | Performed by: INTERNAL MEDICINE

## 2021-08-28 PROCEDURE — 94761 N-INVAS EAR/PLS OXIMETRY MLT: CPT

## 2021-08-28 PROCEDURE — 82728 ASSAY OF FERRITIN: CPT

## 2021-08-28 PROCEDURE — 82550 ASSAY OF CK (CPK): CPT

## 2021-08-28 PROCEDURE — 82330 ASSAY OF CALCIUM: CPT

## 2021-08-28 PROCEDURE — 80307 DRUG TEST PRSMV CHEM ANLYZR: CPT

## 2021-08-28 PROCEDURE — 87641 MR-STAPH DNA AMP PROBE: CPT

## 2021-08-28 PROCEDURE — 6360000002 HC RX W HCPCS: Performed by: EMERGENCY MEDICINE

## 2021-08-28 PROCEDURE — C9113 INJ PANTOPRAZOLE SODIUM, VIA: HCPCS | Performed by: INTERNAL MEDICINE

## 2021-08-28 PROCEDURE — 94003 VENT MGMT INPAT SUBQ DAY: CPT

## 2021-08-28 PROCEDURE — 87205 SMEAR GRAM STAIN: CPT

## 2021-08-28 PROCEDURE — 83735 ASSAY OF MAGNESIUM: CPT

## 2021-08-28 PROCEDURE — 83605 ASSAY OF LACTIC ACID: CPT

## 2021-08-28 PROCEDURE — 85014 HEMATOCRIT: CPT

## 2021-08-28 PROCEDURE — 84443 ASSAY THYROID STIM HORMONE: CPT

## 2021-08-28 PROCEDURE — 36592 COLLECT BLOOD FROM PICC: CPT

## 2021-08-28 PROCEDURE — 80053 COMPREHEN METABOLIC PANEL: CPT

## 2021-08-28 PROCEDURE — 82570 ASSAY OF URINE CREATININE: CPT

## 2021-08-28 RX ORDER — DEXTROSE MONOHYDRATE 50 MG/ML
100 INJECTION, SOLUTION INTRAVENOUS PRN
Status: DISCONTINUED | OUTPATIENT
Start: 2021-08-28 | End: 2021-09-13 | Stop reason: HOSPADM

## 2021-08-28 RX ORDER — NICOTINE POLACRILEX 4 MG
15 LOZENGE BUCCAL PRN
Status: DISCONTINUED | OUTPATIENT
Start: 2021-08-28 | End: 2021-09-13 | Stop reason: HOSPADM

## 2021-08-28 RX ORDER — DEXTROSE MONOHYDRATE 25 G/50ML
12.5 INJECTION, SOLUTION INTRAVENOUS PRN
Status: DISCONTINUED | OUTPATIENT
Start: 2021-08-28 | End: 2021-09-13 | Stop reason: HOSPADM

## 2021-08-28 RX ORDER — PANTOPRAZOLE SODIUM 40 MG/10ML
40 INJECTION, POWDER, LYOPHILIZED, FOR SOLUTION INTRAVENOUS 2 TIMES DAILY
Status: DISCONTINUED | OUTPATIENT
Start: 2021-08-28 | End: 2021-09-09

## 2021-08-28 RX ORDER — FUROSEMIDE 10 MG/ML
80 INJECTION INTRAMUSCULAR; INTRAVENOUS ONCE
Status: COMPLETED | OUTPATIENT
Start: 2021-08-28 | End: 2021-08-28

## 2021-08-28 RX ORDER — PANTOPRAZOLE SODIUM 40 MG/10ML
40 INJECTION, POWDER, LYOPHILIZED, FOR SOLUTION INTRAVENOUS DAILY
Status: DISCONTINUED | OUTPATIENT
Start: 2021-08-28 | End: 2021-08-28

## 2021-08-28 RX ORDER — INSULIN LISPRO 100 [IU]/ML
0-6 INJECTION, SOLUTION INTRAVENOUS; SUBCUTANEOUS EVERY 4 HOURS
Status: DISCONTINUED | OUTPATIENT
Start: 2021-08-28 | End: 2021-09-09 | Stop reason: ALTCHOICE

## 2021-08-28 RX ORDER — POTASSIUM CHLORIDE 29.8 MG/ML
20 INJECTION INTRAVENOUS PRN
Status: DISCONTINUED | OUTPATIENT
Start: 2021-08-28 | End: 2021-08-30

## 2021-08-28 RX ORDER — FUROSEMIDE 10 MG/ML
40 INJECTION INTRAMUSCULAR; INTRAVENOUS ONCE
Status: COMPLETED | OUTPATIENT
Start: 2021-08-28 | End: 2021-08-28

## 2021-08-28 RX ADMIN — PROPOFOL 40 MCG/KG/MIN: 10 INJECTION, EMULSION INTRAVENOUS at 03:04

## 2021-08-28 RX ADMIN — LINEZOLID 600 MG: 600 INJECTION, SOLUTION INTRAVENOUS at 23:21

## 2021-08-28 RX ADMIN — FUROSEMIDE 5 MG/HR: 10 INJECTION, SOLUTION INTRAMUSCULAR; INTRAVENOUS at 03:10

## 2021-08-28 RX ADMIN — HYDRALAZINE HYDROCHLORIDE 10 MG: 20 INJECTION INTRAMUSCULAR; INTRAVENOUS at 11:21

## 2021-08-28 RX ADMIN — FUROSEMIDE 40 MG: 10 INJECTION, SOLUTION INTRAMUSCULAR; INTRAVENOUS at 08:37

## 2021-08-28 RX ADMIN — Medication 10 ML: at 08:20

## 2021-08-28 RX ADMIN — CEFEPIME HYDROCHLORIDE 2000 MG: 2 INJECTION, POWDER, FOR SOLUTION INTRAVENOUS at 09:57

## 2021-08-28 RX ADMIN — FUROSEMIDE 80 MG: 10 INJECTION, SOLUTION INTRAMUSCULAR; INTRAVENOUS at 10:59

## 2021-08-28 RX ADMIN — PROPOFOL 50 MCG/KG/MIN: 10 INJECTION, EMULSION INTRAVENOUS at 20:04

## 2021-08-28 RX ADMIN — PANTOPRAZOLE SODIUM 40 MG: 40 INJECTION, POWDER, FOR SOLUTION INTRAVENOUS at 22:42

## 2021-08-28 RX ADMIN — Medication 10 ML: at 22:52

## 2021-08-28 RX ADMIN — PROPOFOL 50 MCG/KG/MIN: 10 INJECTION, EMULSION INTRAVENOUS at 23:15

## 2021-08-28 RX ADMIN — LINEZOLID 600 MG: 600 INJECTION, SOLUTION INTRAVENOUS at 08:19

## 2021-08-28 RX ADMIN — PROPOFOL 50 MCG/KG/MIN: 10 INJECTION, EMULSION INTRAVENOUS at 16:28

## 2021-08-28 RX ADMIN — PROPOFOL 40 MCG/KG/MIN: 10 INJECTION, EMULSION INTRAVENOUS at 10:10

## 2021-08-28 RX ADMIN — PROPOFOL 50 MCG/KG/MIN: 10 INJECTION, EMULSION INTRAVENOUS at 12:33

## 2021-08-28 RX ADMIN — SODIUM CHLORIDE 25 ML: 9 INJECTION, SOLUTION INTRAVENOUS at 03:07

## 2021-08-28 RX ADMIN — PANTOPRAZOLE SODIUM 40 MG: 40 INJECTION, POWDER, FOR SOLUTION INTRAVENOUS at 14:30

## 2021-08-28 RX ADMIN — PROPOFOL 40 MCG/KG/MIN: 10 INJECTION, EMULSION INTRAVENOUS at 05:53

## 2021-08-28 RX ADMIN — HEPARIN SODIUM 16 UNITS/KG/HR: 10000 INJECTION, SOLUTION INTRAVENOUS at 16:31

## 2021-08-28 RX ADMIN — CEFEPIME HYDROCHLORIDE 2000 MG: 2 INJECTION, POWDER, FOR SOLUTION INTRAVENOUS at 22:45

## 2021-08-28 RX ADMIN — HEPARIN SODIUM 16 UNITS/KG/HR: 10000 INJECTION, SOLUTION INTRAVENOUS at 15:18

## 2021-08-28 ASSESSMENT — PULMONARY FUNCTION TESTS
PIF_VALUE: 29
PIF_VALUE: 23
PIF_VALUE: 22
PIF_VALUE: 25
PIF_VALUE: 24
PIF_VALUE: 25

## 2021-08-28 ASSESSMENT — PAIN SCALES - GENERAL: PAINLEVEL_OUTOF10: 0

## 2021-08-28 NOTE — PROGRESS NOTES
Hospitalist Progress Note      PCP: Francisco Campbell    Date of Admission: 8/27/2021    Chief Complaint: Respiratory distress    Hospital Course: 55 y.o. female who presented to [facility] with past medical history of hypertension, type 2 diabetes, CKD stage IV, HFpEF, hyperlipidemia presented to the ED with chief complaint of respiratory distress.     History cannot be obtained due to patient being intubated sedated. On chart review patient had 3 or 4 arrival sitting in bed and also having some new onset dyspnea that was severe sudden onset and then started progressively turning blue in the lips for her  and EMS was called noted to have saturation 60% on muscles brought here emergently.   Patient in the ED noted was unable to protect her airway thus was emergently intubated for lifesaving measures.       Subjective: Not able to obtain as the patient is intubated and sedated        Medications:  Reviewed    Infusion Medications    furosemide (LASIX) 1mg/ml infusion Stopped (08/28/21 0838)    dextrose      propofol 50 mcg/kg/min (08/28/21 1233)    heparin (PORCINE) Infusion 16 Units/kg/hr (08/28/21 1518)    sodium chloride 25 mL (08/28/21 0307)     Scheduled Medications    insulin lispro  0-6 Units Subcutaneous Q4H    pantoprazole  40 mg IntraVENous BID    sodium chloride flush  5-40 mL IntraVENous 2 times per day    linezolid  600 mg IntraVENous Q12H    cefepime  2,000 mg IntraVENous Q12H     PRN Meds: glucose, dextrose, glucagon (rDNA), dextrose, potassium chloride, fentanNYL, heparin (porcine), heparin (porcine), sodium chloride flush, sodium chloride, ondansetron **OR** ondansetron, polyethylene glycol, acetaminophen **OR** acetaminophen, hydrALAZINE      Intake/Output Summary (Last 24 hours) at 8/28/2021 1609  Last data filed at 8/28/2021 1441  Gross per 24 hour   Intake 30 ml   Output 830 ml   Net -800 ml       Physical Exam Performed:    BP (!) 141/65   Pulse 76   Temp 96.6 °F (35.9 °C) (Bladder)   Resp 16   Ht 5' 6\" (1.676 m)   Wt 245 lb (111.1 kg)   SpO2 100%   BMI 39.54 kg/m²     General appearance: Appears comfortable on the vent looks approximately her stated age. HEENT: Pupils equal, round, and reactive to light. Conjunctivae/corneas clear. Neck: Supple, with full range of motion. No jugular venous distention. Trachea midline. Respiratory: She is vented and has some rales present at the bases bilaterally on her exam.  Eezes/Rhonchi. Cardiovascular: Regular rate and rhythm with normal S1/S2 without murmurs, rubs or gallops. Abdomen: Soft, non-tender, non-distended with normal bowel sounds. Musculoskeletal: No clubbing, cyanosis or edema bilaterally. Full range of motion without deformity. Skin: Skin color, texture, turgor normal.  No rashes or lesions.   Neurologic: She was grossly intact II-XII intact, grossly non-focal.  Psychiatric: Not able to assess as she is intubated and sedated  Capillary Refill: Brisk,3 seconds, normal   Peripheral Pulses: +2 palpable, equal bilaterally       Labs:   Recent Labs     08/27/21 1858 08/28/21 0420 08/28/21  1303   WBC 21.0* 14.7*  --    HGB 9.6* 8.4* 8.0*   HCT 30.2* 25.5* 24.3*    210  --      Recent Labs     08/27/21 1858 08/28/21  0420    138   K 3.9 3.3*    108   CO2 17* 16*   BUN 37* 40*   CREATININE 4.6* 4.8*   CALCIUM 8.4 7.6*   PHOS  --  5.1*     Recent Labs     08/27/21 1858 08/28/21  0420   AST 37 21   ALT 20 14   BILITOT <0.2 <0.2   ALKPHOS 167* 107     Recent Labs     08/27/21 1858   INR 0.99     Recent Labs     08/27/21 1858 08/27/21  2342 08/28/21  0420   CKTOTAL  --   --  172   TROPONINI 0.24* 0.22* 0.23*       Urinalysis:      Lab Results   Component Value Date    NITRU Negative 08/27/2021    WBCUA 7 08/27/2021    RBCUA 3 08/27/2021    BLOODU SMALL 08/27/2021    SPECGRAV 1.013 08/27/2021    GLUCOSEU 250 08/27/2021       Radiology:  XR CHEST PORTABLE   Final Result   Endotracheal tube and enteric tube are in satisfactory position. The left IJ central venous catheter tip projects over the area of the left   brachiocephalic vein. Right basilar airspace disease. Left basilar opacification reflecting airspace disease, atelectasis or   pleural effusion. CT CHEST WO CONTRAST   Final Result      CT HEAD WO CONTRAST   Final Result      NM LUNG VENT/PERFUSION (VQ)    (Results Pending)   VL Renal Arterial Duplex Complete    (Results Pending)           Assessment/Plan:    Active Hospital Problems    Diagnosis     Acute respiratory failure (HCC) [J96.00]     Acute on chronic diastolic heart failure (HCC) [I50.33]     Chronic kidney disease (CKD), stage III (moderate) (HCC) [N18.30]     Chronic diastolic (congestive) heart failure (HCC) [I50.32]     Pulmonary edema [J81.1]        Acute hypoxic respiratory failure: Suspected pulmonary embolism  Responsive to mechanical ventilation  Continue to monitor  COVID-19 NEGATIVE     Suspected pulmonary embolism:  Formal echocardiogram to check for heart strain  Unable to perform CTA due to acute kidney function  VQ scan ordered  Empirically on heparin GTT  This was placed on hold as she started having dark red OG output   I changed IV protonix to BID. OG output is no longer blood tinged. Can likely resume heparin gtt. Acute on chronic HFpEF exacerbation:  Lasix drip initiated     Pneumonia:  Suspected on CT imaging with leukocytosis  Patient will be empirically treated with Zyvox and cefepime.     Hypertensive urgency:  Currently on Lasix drip continue to monitor     Acute on chronic renal failure:  Being followed by nephrology.     Normocytic anemia:  Iron panel ordered, Hemoccult     Proteinuria: Protein to creatinine ratio pending to check for nephrotic syndrome     Chronic medical conditions:  Type 2 Diabetes: Insulin sliding scale  Hyperlipidemia: Continue home medication  Class II obesity:Complicating assessment and treatment.  Placing patient at risk for multiple co-morbidities as well as early death and contributing to the patient's presentation. Education, and counseling       DVT Prophylaxis: heparin   Diet: Diet NPO  Code Status: Full Code    PT/OT Eval Status: when appropriate. Dispo - she remains on the vent   Trying to diurese, covered for pneumonia  Medical decision making remains high.   33 minutes of critical care time spent    Benigno Bray MD

## 2021-08-28 NOTE — PROGRESS NOTES
08/27/21 7303   Patient Transport   Time spent transporting 1-15   Transport ventillation type Transport vent   Transport from [facility]   Transport destination Other  [facility])   Emergency equipment included Yes

## 2021-08-28 NOTE — CONSULTS
P Pulmonary and Critical Care   Consult Note      Reason for Consult: Acute hypoxemic respiratory failure, pulmonary edema, acute on chronic kidney disease  Requesting Physician: Dr. Tyrell Cox:   [address] [facility] / [facility]:                The patient is a 55 y.o. female with significant past medical history of:      Diagnosis Date    Blind in both eyes     Cerebral artery occlusion with cerebral infarction ([address]     [facility]est[facility] failure) [facility]     Chronic kidney disease     Depression     Diabetes mellitus out of control ([facility]     Diabetes mellitus, type II ([facility]     2005    Diabetic neuropathy associated with type 2 diabetes mellitus ([facility]     Generalized headaches     Hypertension     Infertility     Insomnia     chronic vs lack of time spent to sleep    Migraine headache 11/09/2011    Mixed hyperlipidemia     Otitis media     h/o recurrent    Pelvic abscess in female 10/05/2013    Pneumonia     2004 approx.  Stroke [facility]) 08/27/2020     The patient presented to the emergency department yesterday in acute respiratory distress. Apparently, EMS found her oxygen saturation to be in the 60s. She required intubation and mechanical ventilation. Covid testing has been sent and is negative. She does have a history of chronic kidney disease but has been noncompliant with follow-up. There is no family at the bedside.   History is obtained from review of the medical record and discussion with bedside caregivers      Past Surgical History:        Procedure Laterality Date    CERVIX SURGERY      laser tx for dysplasia;1992    EYE SURGERY      FOOT SURGERY Right     FOOT SURGERY Bilateral     FOOT SURGERY Left      Current Medications:    Current Facility-Administered Medications: furosemide (LASIX) 100 mg in dextrose 5 % 100 mL infusion, 5 mg/hr, IntraVENous, Continuous  insulin lispro (1 Unit Dial) 0-6 Units, 0-6 Units, Subcutaneous, Q4H  glucose (GLUTOSE) 40 % oral gel 15 g, 15 g, Oral, PRN  dextrose 50 % IV solution, 12.5 g, IntraVENous, PRN  glucagon (rDNA) injection 1 mg, 1 mg, IntraMUSCular, PRN  dextrose 5 % solution, 100 mL/hr, IntraVENous, PRN  potassium chloride 20 mEq/50 mL IVPB (Central Line), 20 mEq, IntraVENous, PRN  propofol injection, 5-50 mcg/kg/min, IntraVENous, Titrated  fentaNYL (SUBLIMAZE) injection 50 mcg, 50 mcg, IntraVENous, Q1H PRN  heparin (porcine) injection 8,890 Units, 80 Units/kg, IntraVENous, PRN  heparin (porcine) injection 4,440 Units, 40 Units/kg, IntraVENous, PRN  heparin 25,000 units in dextrose 5% 250 mL (premix) infusion, 5-30 Units/kg/hr, IntraVENous, Continuous  sodium chloride flush 0.9 % injection 5-40 mL, 5-40 mL, IntraVENous, 2 times per day  sodium chloride flush 0.9 % injection 5-40 mL, 5-40 mL, IntraVENous, PRN  0.9 % sodium chloride infusion, 25 mL, IntraVENous, PRN  ondansetron (ZOFRAN-ODT) disintegrating tablet 4 mg, 4 mg, Oral, Q8H PRN **OR** ondansetron (ZOFRAN) injection 4 mg, 4 mg, IntraVENous, Q6H PRN  polyethylene glycol (GLYCOLAX) packet 17 g, 17 g, Oral, Daily PRN  acetaminophen (TYLENOL) tablet 650 mg, 650 mg, Oral, Q6H PRN **OR** acetaminophen (TYLENOL) suppository 650 mg, 650 mg, Rectal, Q6H PRN  linezolid (ZYVOX) IVPB 600 mg, 600 mg, IntraVENous, Q12H  cefepime (MAXIPIME) 2000 mg IVPB minibag, 2,000 mg, IntraVENous, Q12H  hydrALAZINE (APRESOLINE) injection 10 mg, 10 mg, IntraVENous, Q4H PRN    Allergies   Allergen Reactions    Amoxicillin Itching and Hives     Tolerates cephalosporins  Patient tolerating cefazolin (ANCEF) as of October 11, 2018  Patient tolerating cefazolin (ANCEF) as of October 11, 2018  Tolerates cephalosporins  Patient tolerating cefazolin (ANCEF) as of October 11, 2018    Levofloxacin Anaphylaxis    Levofloxacin Anaphylaxis    Vancomycin Shortness Of Breath, Hives and Anaphylaxis    Tape [Adhesive Tape] Other (See Comments)     Paper tape turns skin bright red. Plastic tape okay.         Social History: TOBACCO:   reports that she has been smoking cigarettes. She has been smoking about 1.00 pack per day. She has never used smokeless tobacco.  ETOH:   reports no history of alcohol use. Patient currently lives independently  Environmental/chemical exposure: None known    Family History:       Problem Relation Age of Onset    Diabetes Mother    Johanny Me Other Mother 79        Covid    Diabetes Father     High Blood Pressure Father     Colon Cancer Father     Diabetes Sister     Alcohol Abuse Maternal Grandfather     Diabetes Paternal Grandmother     Alcohol Abuse Paternal Grandfather     Diabetes Paternal Aunt     Diabetes Paternal Uncle      REVIEW OF SYSTEMS:    ROS is unobtainable due to his critical illness. Objective:   PHYSICAL EXAM:      VITALS:  BP (!) 182/85   Pulse 82   Temp 97.9 °F (36.6 °C) (Bladder)   Resp 16   Ht 5' 6\" (1.676 m)   Wt 245 lb (111.1 kg)   SpO2 95%   BMI 39.54 kg/m²      24HR INTAKE/OUTPUT:      Intake/Output Summary (Last 24 hours) at 8/28/2021 1128  Last data filed at 8/28/2021 1010  Gross per 24 hour   Intake 30 ml   Output 240 ml   Net -210 ml     CONSTITUTIONAL: Sedated on mechanical ventilation  NECK:  Supple, symmetrical, trachea midline, no adenopathy, thyroid symmetric, not enlarged and no tenderness, skin normal  LUNGS:  no increased work of breathing and crackles to auscultation. No accessory muscle use  CARDIOVASCULAR: S1 and S2, no edema and no JVD  ABDOMEN:  normal bowel sounds, non-distended and no masses palpated, and no tenderness to palpation. No hepatospleenomegaly  LYMPHADENOPATHY:  no axillary or supraclavicular adenopathy. No cervical adnenopathy  PSYCHIATRIC: Sedated on mechanical ventilation MUSCULOSKELETAL: No obvious misalignment or effusion of the joints. No clubbing, cyanosis of the digits. SKIN:  normal skin color, texture, turgor and no redness, warmth, or swelling.  No palpable nodules    DATA:    Old records have been reviewed    CBC:  Recent Labs     08/27/21 1858 08/28/21  0420   WBC 21.0* 14.7*   RBC 3.37* 2.89*   HGB 9.6* 8.4*   HCT 30.2* 25.5*    210   MCV 89.5 88.2   MCH 28.5 29.0   MCHC 31.8 32.8   RDW 16.3* 15.8*      BMP:  Recent Labs     08/27/21  1858 08/28/21  0420    138   K 3.9 3.3*    108   CO2 17* 16*   BUN 37* 40*   CREATININE 4.6* 4.8*   CALCIUM 8.4 7.6*   GLUCOSE 161* 114*      ABG:  Recent Labs     08/27/21  1930   PHART 7.233*   VBO8JRR 40.9   PO2ART 243.0*   KBW1FHG 17.2*   M4XMNYYK 100.0   BEART -9.6*     Procalcitonin  No results for input(s): PROCAL in the last 72 hours. No results found for: BNP  Lab Results   Component Value Date    CKTOTAL 172 08/28/2021    TROPONINI 0.23 (H) 08/28/2021           Radiology Review:  All pertinent images / reports were reviewed as a part of this visit. Assessment:     1. Acute hypoxemic respiratory failure  2. Acute on chronic kidney disease  3. Acute pulmonary edema      Plan:     I have reviewed laboratories, medical records and images for this visit  Chest imaging is consistent with acute pulmonary edema  BNP is greater than 30,000  Her creatinine is 4.8 with a baseline in the mid 2s  Covid PCR is negative  She did have a white blood cell count of 21,000. She is on Zyvox and cefepime  Obtain procalcitonin  If this is negative, we can begin to decrease her antibiotic coverage  Nephrology is following  She is receiving bolus dose of IV Lasix  She remains on mechanical ventilation at AC/, respiratory rate 14, FiO2 0.3 and PEEP 5  ABG is 7.2 3/41/243  We will wait on weaning mechanical ventilation until she has had some diuresis and shows some improvement in her chest x-ray. Discussed with Dr. Nellie Sandifer.     Total critical care time caring for this patient with life threatening illness, including direct patient contact, management of life support systems, review of data including imaging and labs, discussions with other team members and physicians is at least 32 minutes so far today, excluding procedures.

## 2021-08-28 NOTE — CONSULTS
[address] [facility]  [phone]      Chief Complaint   Patient presents with    Respiratory Distress     Pt brought in per [facility] after call from  for resp distress. O2 sat 70's, BMV in route. Pt will open eyes to voice. Pupils 8mm and fixed. History of Present Illness:  Tam Jean is a 55 y.o. patient who presented to the hospital with complaints of respiratory distress. She had sudden onset of SOB and came to the ED a few hours later. She was intubated for hypoxia sO2 60% in the ED. Troponin, bnp, creatinine, Alk phos are all elevated. Lactate normal. CT scan shows pulmonary edema and PNA. I have been asked to provide consultation regarding further management and testing. Past Medical History:   has a past medical history of Blind in both eyes, Cerebral artery occlusion with cerebral infarction ([address] CHF (congestive heart failure) ([address] Chronic kidney disease, Depression, Diabetes mellitus out of control ([address] Diabetes mellitus, type II ([address] Diabetic neuropathy associated with type 2 diabetes mellitus ([address] Generalized headaches, Hypertension, Infertility, Insomnia, Migraine headache, Mixed hyperlipidemia, Otitis media, Pelvic abscess in female, Pneumonia, and Stroke ([address] Surgical History:   has a past surgical history that includes Cervix surgery; eye surgery; Foot surgery (Right); Foot surgery (Bilateral); and Foot surgery (Left). Social History:   reports that she has been smoking cigarettes. She has been smoking about 1.00 pack per day. She has never used smokeless tobacco. She reports that she does not drink alcohol and does not use drugs. Family History:  family history includes Alcohol Abuse in her maternal grandfather and paternal grandfather; Anais Hurl in her father; Diabetes in her father, mother, paternal aunt, paternal grandmother, paternal uncle, and sister; High Blood Pressure in her father;  Other (age of onset: 79) in her (porcine) injection 8,890 Units  80 Units/kg IntraVENous PRN Frances Farias MD        heparin (porcine) injection 4,440 Units  40 Units/kg IntraVENous PRN Frances Farias MD        heparin 25,000 units in dextrose 5% 250 mL (premix) infusion  5-30 Units/kg/hr IntraVENous Continuous Frances Farias MD   Stopped at 08/28/21 4753    sodium chloride flush 0.9 % injection 5-40 mL  5-40 mL IntraVENous 2 times per day Daphine Monk A Oleg, DO   10 mL at 08/28/21 0820    sodium chloride flush 0.9 % injection 5-40 mL  5-40 mL IntraVENous PRN Brittany Archerzi, DO        0.9 % sodium chloride infusion  25 mL IntraVENous PRN Shi Dejesus, DO 25 mL/hr at 08/28/21 0307 25 mL at 08/28/21 0307    ondansetron (ZOFRAN-ODT) disintegrating tablet 4 mg  4 mg Oral Q8H PRN Ahmad A Oleg, DO        Or    ondansetron (ZOFRAN) injection 4 mg  4 mg IntraVENous Q6H PRN Ahmad A Oleg, DO        polyethylene glycol (GLYCOLAX) packet 17 g  17 g Oral Daily PRN Shi Archerzi, DO        acetaminophen (TYLENOL) tablet 650 mg  650 mg Oral Q6H PRN Ahmad A Oleg, DO        Or    acetaminophen (TYLENOL) suppository 650 mg  650 mg Rectal Q6H PRN Daphine Monk A Oleg, DO        linezolid (ZYVOX) IVPB 600 mg  600 mg IntraVENous Q12H Ahmad A Oleg,  mL/hr at 08/28/21 0819 600 mg at 08/28/21 3759    cefepime (MAXIPIME) 2000 mg IVPB minibag  2,000 mg IntraVENous Q12H Ahmad A Oleg, DO        hydrALAZINE (APRESOLINE) injection 10 mg  10 mg IntraVENous Q4H PRN Ahmad A Oleg, DO            Allergies:  Amoxicillin, Levofloxacin, Levofloxacin, Vancomycin, and Tape [adhesive tape]     Review of Systems:     · Constitutional: there has been no unanticipated weight loss. There's been no change in energy level, sleep pattern, or activity level. · Eyes: No visual changes or diplopia. No scleral icterus. · ENT: No Headaches, hearing loss or vertigo. No mouth sores or sore throat.   · Cardiovascular: Reviewed in HPI  · Respiratory: No cough or wheezing, no sputum production. No hematemesis. · Gastrointestinal: No abdominal pain, appetite loss, blood in stools. No change in bowel or bladder habits. · Genitourinary: No dysuria, trouble voiding, or hematuria. · Musculoskeletal:  No gait disturbance, weakness or joint complaints. · Integumentary: No rash or pruritis. · Neurological: No headache, diplopia, change in muscle strength, numbness or tingling. No change in gait, balance, coordination, mood, affect, memory, mentation, behavior. · Psychiatric: No anxiety, no depression. · Endocrine: No malaise, fatigue or temperature intolerance. No excessive thirst, fluid intake, or urination. No tremor. · Hematologic/Lymphatic: No abnormal bruising or bleeding, blood clots or swollen lymph nodes. · Allergic/Immunologic: No nasal congestion or hives.   ·     Physical Examination:    Vitals:    08/28/21 0903   BP:    Pulse:    Resp: 16   Temp:    SpO2: 96%    Weight: 245 lb (111.1 kg)         General Appearance:  Intubated and sedated no distress, appears stated age   Head:  Normocephalic, without obvious abnormality, atraumatic   Eyes:  PERRL, conjunctiva/corneas clear       Nose: Nares normal, no drainage or sinus tenderness   Throat: Lips, mucosa, and tongue normal   Neck: Supple, symmetrical, trachea midline, no adenopathy, thyroid: not enlarged, symmetric, no tenderness/mass/nodules, no carotid bruit or JVD       Lungs:   Rales to auscultation bilaterally, respirations unlabored   Chest Wall:  No tenderness or deformity   Heart:  Regular rate and rhythm, S1, S2 normal, no murmur, rub or gallop   Abdomen:   Soft, non-tender, bowel sounds active all four quadrants,  no masses, no organomegaly           Extremities: Extremities normal, atraumatic, no cyanosis or edema   Pulses: 2+ and symmetric   Skin: Skin color, texture, turgor normal, no rashes or lesions   Pysch: Normal mood and affect   Neurologic: Normal gross motor and sensory exam.         Labs  CBC:   Lab Results   Component Value Date    WBC 14.7 08/28/2021    RBC 2.89 08/28/2021    HGB 8.4 08/28/2021    HCT 25.5 08/28/2021    MCV 88.2 08/28/2021    RDW 15.8 08/28/2021     08/28/2021     CMP:    Lab Results   Component Value Date     08/28/2021    K 3.3 08/28/2021     08/28/2021    CO2 16 08/28/2021    BUN 40 08/28/2021    CREATININE 4.8 08/28/2021    GFRAA 12 08/28/2021    GFRAA >60 11/09/2011    AGRATIO 0.8 08/28/2021    LABGLOM 10 08/28/2021    GLUCOSE 114 08/28/2021    PROT 4.6 08/28/2021    PROT 6.8 11/09/2011    CALCIUM 7.6 08/28/2021    BILITOT <0.2 08/28/2021    ALKPHOS 107 08/28/2021    AST 21 08/28/2021    ALT 14 08/28/2021     PT/INR:  No results found for: PTINR  Lab Results   Component Value Date    CKTOTAL 172 08/28/2021    TROPONINI 0.23 (H) 08/28/2021       EKG:  I have reviewed EKG with the following interpretation:  Impression:  Normal sinus rhythmNonspecific T wave abnormalityProlonged     Pt has CAD with following history:  CABG: (date/details),   Valve replacement (date/details)   GXT/Myoview/Lexiscan: (date)  (results)   Stress/echo: (date) (result)   CATH/PCI:  (date)- (results)  - (# and type of stents) -   ECHO: (date) results EF    %. Summary   *Left ventricle - moderate concentric LVH, normal size and function with EF   of 55%   *Mitral valve - mild regurgitation   *Tricuspid valve - trivial regurgitation   *Bubble study - negative  ALFREDO (date) (results)  EP procedures/studies/ablation:  (specific data). Device information:  Event monitor: (date/results)    All testing and labs listed below were personally reviewed.     Assessment  Patient Active Problem List   Diagnosis    Type 2 diabetes mellitus, with long-term current use of insulin ([facility]    Mixed hyperlipidemia    Migraine headache    Anemia    Diabetic foot infection ([address]    Pyogenic inflammation of bone ([facility]    History of medication noncompliance    Osteomyelitis of left foot (HCC)    Nephrotic syndrome    Peripheral edema    Pulmonary edema    Right sided numbness    Tobacco dependence    Chronic kidney disease (CKD), stage III (moderate) (HCC)    Chronic diastolic (congestive) heart failure (HCC)    History of CVA (cerebrovascular accident)    Arterial ischemic stroke, ICA, left, acute ([facility]    HTN (hypertension), benign    DM (diabetes mellitus), secondary, uncontrolled, w/neurologic complic ([facility]    Dyslipidemia    Smoker    Panic disorder without agoraphobia    Isolated proteinuria    Acute on chronic diastolic heart failure (HCC)    Diabetic peripheral neuropathy (HCC)    Acute respiratory failure ([facility]         Plan:    I had the opportunity to review the clinical symptoms and presentation of Orlando Baron. Assessment/Plan:  Active Problems:    Acute respiratory failure (HCC)  Plan: sudden hypoxic resp failure. No COVID. H/o CKD with KINZA. Severely elevated BNP but LV function essentially normal. Recommend stress myoview when medically stable. Cont lasix dosed by nephrology. Cont BP control. No cardiac cause for resp failure. Check for renal artery stenosis. Likely mix aspiration PNA and renal failure. Critical Care   Due to the high probability of clinically significant life threating deterioration of the patient's condition that required my urgent intervention, a total critical care time of >35 minutes was used. This time excludes any time that may have been spent performing procedures. This includes but not limited to vital sign monitoring, telemetry monitoring, continuous pulse oximety, IV medication, clinical response to the IV medications, documentation time , consultation time, interpretation of lab data, review of nursing notes and old record review.      All questions and concerns were addressed to the patient/family. Alternatives to my treatment were discussed. The note was completed using EMR.  Every effort was made to ensure accuracy; however, inadvertent computerized transcription errors may be present.        I will address the patient's cardiac risk factors and adjusted pharmacologic treatment as needed. In addition, I have reinforced the need for patient directed risk factor modification. Tobacco use was discussed with the patient and educated on the negative effects. I have asked the patient to not utilize these agents. Thank you for allowing to us to participate in the care or Will Round. Further evaluation will be based upon the patient's clinical course and testing results. All questions and concerns were addressed to the patient/family. Alternatives to my treatment were discussed. The note was completed using EMR. Every effort was made to ensure accuracy; however, inadvertent computerized transcription errors may be present.     Tereza Lundberg MD, MD 8/28/2021 9:31 AM

## 2021-08-28 NOTE — PROGRESS NOTES
Heparin gtt infusing @ 12 units/kg/hour as ordered. Pt now having dark red OG output, was light brown prior, bright red blood with [place]auer suctioning, which is new, heparin gtt placed on hold, hospitalist notified.

## 2021-08-28 NOTE — ED NOTES
Dr. Rogelio Cotton notified of patients blood pressure, see new orders.       [facility], RN  08/27/21 4310

## 2021-08-28 NOTE — PROGRESS NOTES
Hospitalist updated, new order for PPI and H&H placed. UPDATE, 1500- H&H stable for pt, IV protonic administered, blood tinged OG output has resolved since stopping heparin, discussed with hospitalist, aPTT checked, D-dimer elevated, heparin gtt started, will closely monitor for bleeding.

## 2021-08-28 NOTE — PROGRESS NOTES
Per nephrology turn off lasix gtt, give lasix injection, message in 2 hours with U/O.    Update 1030: nephrologist on [facility] unit updated of U/O.

## 2021-08-28 NOTE — PROGRESS NOTES
08/28/21 1922   Vent Patient Data   Plateau Pressure 23 QEM51   Static Compliance 33 mL/cmH2O   Dynamic Compliance 31 mL/cmH2O

## 2021-08-28 NOTE — PROGRESS NOTES
08/28/21 0903   Vent Patient Data   Plateau Pressure 22 GFX16   Static Compliance 35 mL/cmH2O   Dynamic Compliance 28.7 mL/cmH2O

## 2021-08-28 NOTE — CONSULTS
MD Lincoln Watts MD Limmie Parsley, MD               Office: [phone]                      Fax: [phone]             [facility]                   NEPHROLOGY INITIAL CONSULT NOTE:     PATIENT NAME: Ki Coreas  : 1975  MRN: [id number]  REASON FOR CONSULT: For evaluation and management of Acute Kidney Injury . (My recommendations will be communicated by way of shared medical record.)      RECOMMENDATIONS:   -Give IV lasix 40 -> 80 mg stat, then drip at 5 mg/hr  -Hold home dose of spinal lactone, lisinopril,  -COVID ruled out  -Follow echo results   -Last echo-2020  moderate concentric LVH, normal size and function with EF   of 27%, normal diastolic function  -PNA Rx w/ iv abx    -BP is improving w/ hydralazine, use PRN for >150,  -vent mx per [facility]    -No urgent indication for dialysis currently, but needs close monitoring, as might need insulin next 1-2 days, if no significant improvement with aggressive diuresis. - at higher risk for decompensation, needing closer monitoring.     D/C plan from renal stand point:  - unclear, as critically ill     Discussed with patient's treatment team-intensivist, hospitalist, ICU nurse,      IMPRESSION:       Admitted for:  Acute respiratory failure ([address] [J96.00]  Encephalopathy [G93.40]  Respiratory failure with hypoxia, unspecified chronicity ([address] [J96.91]  Pneumonia due to infectious organism, unspecified laterality, unspecified part of lung [J18.9]      KINZA (on proteinuric CKD: 4):  - BL Scr-last known 2.5, in 2021,   ---> 4.6 on admission  -: Etiology of KINZA -presumed ATN in the setting of pneumonia, unclear if it this is advancing CKD  -Needs work-up    History of nephrotic range proteinuria, thought to be from diabetic nephropathy  With CKD stage III  -Follows with Dr. Sheth Lobe  -Noncompliance, last follow-up was 2021 and was lost for follow-up        Associated problems:   Hypokalemia-needing repletion  Acidosis, non-anion gap-  Hypomagnesemia-  Hypophosphatemia  Anemia, chronic disease-worsening        Other major problems: Management per primary and other consulting teams.   //Acute hypoxic respiratory failure -needing intubation  // Multifocal airspace disease that likely represents a combination of aspiration/pneumonia and pulmonary edema  //Fluid overload    //History of uncontrolled diabetes last A1c was 11.3    // Hypertension un-controlled,       Hospital Problems         Last Modified POA    Acute respiratory failure ([facility] 8/27/2021 Yes        : other supportive care :   - Check daily renal function panel with electrolytes-phosphorus  - Strict monitoring of I/Os, daily weight  - Renal feeds/diet  - Current medications reviewed. - Nephrotoxic medications have been discontinued. - Dose adjusted and appropriate. - Dose meds for eGFR <15 mL/min/1.73m2 during KINZA    - Avoid heavy opioids due to renal failure - may use very low dose dilaudid / fentanyl with close monitoring of CNS and respiratory depression. Please refer to the orders. High Complexity. Multiple complex problems. Discussed with patient 's treatment team- ICU team, nurse     Thank you for allowing me to participate in this patient's care. Please do not hesitate to contact me anytime. We will follow along with you. Kacey Shaver MD,  [facility] [address] [place]: [phone] or [address] [employer]  Fax: [phone]     Severally ill, at risk of impending organ failure needing ICU higher level of care/monitoring.    Time spent  35 minutes that included face-to-face meeting/discussion with patient's treatment team (including primary/referring team and other consultants; included coordination of care with the treatment team; and review of patient's electronic medical records and ordering appropriates tests.         ======================================================== ========================================================       CHIEF COMPLAINT:   Chief Complaint   Patient presents with    Respiratory Distress     Pt brought in per [facility] [facility] after call from  for resp distress. O2 sat 70's, BMV in route. Pt will open eyes to voice. Pupils 8mm and fixed. History Obtained From:  reason patient could not give history:  on ventilator + treatment team + Electronic Medical Records    HPI: Ms. Cindy Richards is a 55 y.o. female with significant past medical history as below:   Past Medical History:   Diagnosis Date    Blind in both eyes     Cerebral artery occlusion with cerebral infarction ([address]     CHF (congestive heart failure) ([address]     Chronic kidney disease     Depression     Diabetes mellitus out of control ([address]     Diabetes mellitus, type II ([address]     2005    Diabetic neuropathy associated with type 2 diabetes mellitus ([address]     Generalized headaches     Hypertension     Infertility     Insomnia     chronic vs lack of time spent to sleep    Migraine headache 11/09/2011    Mixed hyperlipidemia     Otitis media     h/o recurrent    Pelvic abscess in female 10/05/2013    Pneumonia     2004 approx.  Stroke [facility]) 08/27/2020      Presents with Respiratory Distress (Pt brought in per [facility] [facility] after call from  for resp distress. O2 sat 70's, BMV in route. Pt will open eyes to voice. Pupils 8mm and fixed. )    Admitted with Acute respiratory failure ([address] [J96.00]  Encephalopathy [G93.40]  Respiratory failure with hypoxia, unspecified chronicity ([address] [J96.91]  Pneumonia due to infectious organism, unspecified laterality, unspecified part of lung [J18.9]   Found to have KINZA , so we are called for that.   Came in with respiratory distress, after shortness of breath, EMS was called, she was saturating in the 60s, in the ER intubated for airway protection, with history of COPD, underwent CT chest showing pneumonia, pulmonary edema, admitted to ICU for further evaluation,  Regarding: KINZA on CKD   · Duration: acute on chronic   · Location: kidneys  · Severity: Severe    · Timing: continous  · Context (ex: related to condition):   , refer to my assessment / impression. · Modifying factors (ex: medications, interventions):   , refer to my plan / recommendation. · Associated signs & symptoms (ex: edema, SOB): As mentioned above in CC and HPI      Past medical, Surgical, Social, Family medical history reviewed by me. PAST MEDICAL HISTORY:   Past Medical History:   Diagnosis Date    Blind in both eyes     Cerebral artery occlusion with cerebral infarction ([address]     CHF (congestive heart failure) (HCC)     Chronic kidney disease     Depression     Diabetes mellitus out of control ([address]     Diabetes mellitus, type II ([address]     2005    Diabetic neuropathy associated with type 2 diabetes mellitus ([address]     Generalized headaches     Hypertension     Infertility     Insomnia     chronic vs lack of time spent to sleep    Migraine headache 11/09/2011    Mixed hyperlipidemia     Otitis media     h/o recurrent    Pelvic abscess in female 10/05/2013    Pneumonia     2004 approx.     Stroke [facility]) 08/27/2020     PAST SURGICAL HISTORY:   Past Surgical History:   Procedure Laterality Date    CERVIX SURGERY      laser tx for dysplasia;1992    EYE SURGERY      FOOT SURGERY Right     FOOT SURGERY Bilateral     FOOT SURGERY Left      FAMILY HISTORY:   Family History   Problem Relation Age of Onset    Diabetes Mother    Aurea Wagoner Other Mother 79        Covid    Diabetes Father     High Blood Pressure Father     Colon Cancer Father     Diabetes Sister     Alcohol Abuse Maternal Grandfather     Diabetes Paternal Grandmother     Alcohol Abuse Paternal Grandfather     Diabetes Paternal Aunt     Diabetes Paternal Uncle      SOCIAL HISTORY:   Social History     Socioeconomic History    Marital status:      Spouse name: Britany Johnson Number of children: 0    Years of education: Not on file    Highest education level: Not on file   Occupational History    Occupation: works as    Tobacco Use    Smoking status: Current Every Day Smoker     Packs/day: 1.00     Types: Cigarettes    Smokeless tobacco: Never Used   Vaping Use    Vaping Use: Never used   Substance and Sexual Activity    Alcohol use: No     Comment: Very Rare    Drug use: No    Sexual activity: Yes     Partners: Male   Other Topics Concern    Not on file   Social History Narrative    Not on file     Social Determinants of Health     Financial Resource Strain:     Difficulty of Paying Living Expenses:    Food Insecurity:     Worried About [address] in the Last Year:     [address] in the Last Year:    Transportation Needs:     Lack of Transportation (Medical):  Lack of Transportation (Non-Medical):    Physical Activity:     Days of Exercise per Week:     Minutes of Exercise per Session:    Stress:     Feeling of Stress :    Social Connections:     Frequency of Communication with Friends and Family:     Frequency of Social Gatherings with Friends and Family:     Attends [employer] Services:     Active Member of Clubs or Organizations:     Attends Club or Organization Meetings:     Marital Status:    Intimate Partner Violence:     Fear of Current or Ex-Partner:     Emotionally Abused:     Physically Abused:     Sexually Abused:           MEDICATIONS: reviewed by me. Medications Prior to Admission:  No current facility-administered medications on file prior to encounter.      Current Outpatient Medications on File Prior to Encounter   Medication Sig Dispense Refill    Dulaglutide 0.75 MG/0.5ML SOPN Inject 0.75 mg into the skin once a week 4 pen 1    ibuprofen (ADVIL;MOTRIN) 200 MG CAPS Take 1 capsule by mouth      furosemide (LASIX) 80 MG tablet Take 80 mg by mouth every morning 80mg in the morning 40mg in the evening      amLODIPine (NORVASC) 10 MG tablet Take 1 tablet by mouth daily 30 tablet 1    furosemide (LASIX) 40 MG tablet Take 1 tablet by mouth every evening 30 tablet 1    spironolactone (ALDACTONE) 50 MG tablet Take 1 tablet by mouth daily 30 tablet 2    furosemide (LASIX) 80 MG tablet Take 1 tablet by mouth daily 60 tablet 1    insulin glargine (LANTUS;BASAGLAR) 100 UNIT/ML injection pen Inject 30 Units into the skin daily 4 pen 2    lisinopril (PRINIVIL;ZESTRIL) 40 MG tablet Take 1 tablet by mouth daily 30 tablet 2    atorvastatin (LIPITOR) 40 MG tablet Take 1 tablet by mouth nightly 30 tablet 3    Insulin Pen Needle 29G X 12.7MM MISC 1 each by Does not apply route daily 100 each 5    propranolol (INDERAL LA) 80 MG extended release capsule Take 1 capsule by mouth every morning 30 capsule 2    nicotine (NICODERM CQ) 21 MG/24HR Place 1 patch onto the skin daily 30 patch 2    LORazepam (ATIVAN) 0.5 MG tablet Take 0.5 mg by mouth 2 times daily as needed for Anxiety.  aspirin 81 MG EC tablet Take 1 tablet by mouth daily 30 tablet 3    pregabalin (LYRICA) 75 MG capsule Take 1 capsule by mouth nightly for 7 days.  7 capsule 0    sertraline (ZOLOFT) 50 MG tablet Take 1 tablet by mouth daily 30 tablet 3    glucose monitoring kit (FREESTYLE) monitoring kit 1 kit by Does not apply route daily 1 kit 0    insulin lispro, 1 Unit Dial, 100 UNIT/ML SOPN Inject 0-6 Units into the skin 3 times daily (with meals) **Corrective Low Dose Algorithm**  Glucose: Dose:               No Insulin  140-199 1 Unit  200-249 2 Units  250-299 3 Units  300-349 4 Units  350-399 5 Units  Over 399 6 Units (Patient taking differently: Inject 6-12 Units into the skin 3 times daily (with meals) **Corrective Low Dose Algorithm**  Glucose: Dose:               No Insulin  140-199 1 Unit  200-249 2 Units  250-299 3 Units  300-349 4 Units  350-399 5 Units  Over 399 6 Units) 3 pen 0    glucose blood VI test strips (VICTORY AGM-4000 TEST) strip Test four times daily until controlled and then three times daily.   Code 250.02  ONE TOUCH ULTRA MONITOR 100 strip 12         Current Facility-Administered Medications:     furosemide (LASIX) 100 mg in dextrose 5 % 100 mL infusion, 5 mg/hr, IntraVENous, Continuous, Reymundod IRA Dejesus, DO, Last Rate: 5 mL/hr at 08/28/21 0310, 5 mg/hr at 08/28/21 0310    insulin lispro (1 Unit Dial) 0-6 Units, 0-6 Units, Subcutaneous, Q4H, Brittany Dejesus, DO    glucose (GLUTOSE) 40 % oral gel 15 g, 15 g, Oral, PRN, Reymundod A Oleg, DO    dextrose 50 % IV solution, 12.5 g, IntraVENous, PRN, Reymundod IRA Dejesus, DO    glucagon (rDNA) injection 1 mg, 1 mg, IntraMUSCular, PRN, Reymundod IRA Archerzi, DO    dextrose 5 % solution, 100 mL/hr, IntraVENous, PRN, Reymundod IRA Dejesus, DO    potassium chloride 20 mEq/50 mL IVPB (Central Line), 20 mEq, IntraVENous, PRN, Ila Lind MD    propofol injection, 5-50 mcg/kg/min, IntraVENous, Titrated, Carla Song MD, Last Rate: 26.7 mL/hr at 08/28/21 0553, 40 mcg/kg/min at 08/28/21 0553    fentaNYL (SUBLIMAZE) injection 50 mcg, 50 mcg, IntraVENous, Q1H PRN, Carla Song MD    heparin (porcine) injection 8,890 Units, 80 Units/kg, IntraVENous, PRN, Carla Song MD    heparin (porcine) injection 4,440 Units, 40 Units/kg, IntraVENous, PRN, Carla Song MD    heparin 25,000 units in dextrose 5% 250 mL (premix) infusion, 5-30 Units/kg/hr, IntraVENous, Continuous, Carla Song MD, Stopped at 08/28/21 0664    sodium chloride flush 0.9 % injection 5-40 mL, 5-40 mL, IntraVENous, 2 times per day, Nolan Dejesus, DO    sodium chloride flush 0.9 % injection 5-40 mL, 5-40 mL, IntraVENous, PRN, Reymundod IRA Dejesus, DO    0.9 % sodium chloride infusion, 25 mL, IntraVENous, PRN, Brittany Dejesus DO, Last Rate: 25 mL/hr at 08/28/21 0307, 25 mL at 08/28/21 0307    ondansetron (ZOFRAN-ODT) disintegrating tablet 4 mg, 4 mg, Oral, Q8H PRN **OR** ondansetron (ZOFRAN) injection 4 mg, 4 mg, IntraVENous, Q6H PRN, Nolan Dejesus DO   polyethylene glycol (GLYCOLAX) packet 17 g, 17 g, Oral, Daily PRN, Fox Dejesus DO    acetaminophen (TYLENOL) tablet 650 mg, 650 mg, Oral, Q6H PRN **OR** acetaminophen (TYLENOL) suppository 650 mg, 650 mg, Rectal, Q6H PRN, Brittany Dejesus DO    linezolid (ZYVOX) IVPB 600 mg, 600 mg, IntraVENous, Q12H, Brittany Dejesus DO, Last Rate: 300 mL/hr at 08/28/21 0819, 600 mg at 08/28/21 3244    cefepime (MAXIPIME) 2000 mg IVPB minibag, 2,000 mg, IntraVENous, Q12H, Brittany Dejesus DO    hydrALAZINE (APRESOLINE) injection 10 mg, 10 mg, IntraVENous, Q4H PRN, Brittany Dejesus DO      Allergies reviewed by me: Amoxicillin, Levofloxacin, Levofloxacin, Vancomycin, and Tape [adhesive tape]    REVIEW OF SYSTEMS:     Review of systems not obtained due to patient factors - intubation       PHYSICAL EXAM:  Recent vital signs and recent I/Os reviewed by me.      Wt Readings from Last 3 Encounters:   08/27/21 245 lb (111.1 kg)   07/08/21 244 lb 11.2 oz (111 kg)   02/26/21 237 lb 3.2 oz (107.6 kg)     BP Readings from Last 3 Encounters:   08/28/21 (!) 146/77   07/08/21 (!) 160/100   02/26/21 133/86     Patient Vitals for the past 24 hrs:   BP Temp Temp src Pulse Resp SpO2 Weight   08/28/21 0400 (!) 146/77 -- -- 75 16 97 % --   08/28/21 0335 -- -- -- -- 16 97 % --   08/28/21 0300 (!) 151/79 -- -- 74 16 97 % --   08/28/21 0200 (!) 147/79 -- -- 71 16 97 % --   08/28/21 0100 (!) 176/93 -- -- 74 16 96 % --   08/28/21 0000 (!) 174/93 -- -- 77 16 -- --   08/27/21 2313 -- -- -- 77 16 99 % --   08/27/21 2215 (!) 166/80 96 °F (35.6 °C) Rectal 77 18 -- --   08/27/21 2200 (!) 177/95 -- -- 75 16 -- --   08/27/21 2145 (!) 195/104 -- -- 76 16 100 % --   08/27/21 2130 (!) 192/104 -- -- 77 16 100 % --   08/27/21 2115 (!) 181/98 -- -- 76 16 100 % --   08/27/21 2100 (!) 183/101 -- -- 79 16 100 % --   08/27/21 2045 (!) 192/103 -- -- 82 16 100 % --   08/27/21 2030 (!) 192/101 -- -- 83 16 100 % --   08/27/21 2015 (!) 189/105 -- -- 84 16 99 % --   08/27/21 2000 (!) 177/96 -- -- 84 16 99 % --   08/27/21 1952 -- -- -- -- 16 98 % --   08/27/21 1930 (!) 168/93 -- -- 86 16 100 % --   08/27/21 1924 -- -- -- -- 17 100 % --   08/27/21 1915 (!) 156/88 -- -- 87 16 100 % --   08/27/21 1903 -- -- -- 90 13 100 % --   08/27/21 1900 (!) 165/93 -- -- 91 17 100 % --   08/27/21 1854 (!) 197/100 -- -- 100 24 100 % 245 lb (111.1 kg)     No intake or output data in the 24 hours ending 08/28/21 0820       Constitutional: Poorly responsive, Intubated and  obese habitus  Eyes: Conjunctiva clear and Noscleral icterus  Ear, Nose, and Throat: moist oral mucosa; ET to vent  Neck: Trachea midline,  No jugular venous distension  Cardiovascular: Regular rate and ryhthm, normal S1 and S2,    Respiratory: Mechanically ventilated; Lung sounds notable for synchronous vent-associated breath sounds  Abdomen:  distended. Normal bowel sounds. : Meza catheter is inserted, draining urine  Skin: no rash,  bruises on visible body area  Musculoskeletal: No clubbing or cyanosis of digits. and Normocephalic. Extremitties: +++ peripheral edema, no deformities. Neurologic:Unable to obtain as intubated/sedated. Psychiatric: Unable to obtain as intubated/sedated. DATA:  Diagnostic tests reviewed by me for today's visit:   (AS NEEDED FOR MY EVALUATION AND MANAGEMENT).        Recent Labs     08/27/21 1858 08/28/21  0420   WBC 21.0* 14.7*   HCT 30.2* 25.5*    210     Iron Saturation:  No components found for: PERCENTFE  FERRITIN:    Lab Results   Component Value Date    FERRITIN 52.2 02/23/2021     IRON:    Lab Results   Component Value Date    IRON 36 02/23/2021     TIBC:    Lab Results   Component Value Date    TIBC 298 02/23/2021       Recent Labs     08/27/21 1858 08/28/21  0420    138   K 3.9 3.3*    108   CO2 17* 16*   BUN 37* 40*   CREATININE 4.6* 4.8*     Recent Labs     08/27/21  1858 08/28/21  0420   CALCIUM 8.4 7.6*   MG  --  1.70*   PHOS  --  5.1*     No results for input(s): PH, PCO2, PO2 in the last 72 hours.     Invalid input(s): [id number], INSPIREDO2    ABG:  No results found for: PH, PCO2, PO2, HCO3, BE, THGB, TCO2, O2SAT  VBG:    Lab Results   Component Value Date    PHVEN 7.368 08/28/2021    KGR5KXA 34.3 02/23/2021    BEVEN -4.6 02/23/2021    R8DCNMMA 100 02/23/2021       LDH:  No results found for: LDH  Uric Acid:  No results found for: LABURIC, URICACID    PT/INR:    Lab Results   Component Value Date    PROTIME 11.2 08/27/2021    INR 0.99 08/27/2021     Warfarin PT/INR:  No components found for: Nataly Felicitas  PTT:    Lab Results   Component Value Date    APTT >248.0 08/28/2021   [APTT}  Last 3 Troponin:    Lab Results   Component Value Date    TROPONINI 0.23 08/28/2021    TROPONINI 0.22 08/27/2021    TROPONINI 0.24 08/27/2021       U/A:    Lab Results   Component Value Date    COLORU YELLOW 08/27/2021    PROTEINU >300 08/27/2021    PHUR 6.0 08/27/2021    PHUR 6.0 08/27/2021    WBCUA 7 08/27/2021    RBCUA 3 08/27/2021    CLARITYU Clear 08/27/2021    SPECGRAV 1.013 08/27/2021    LEUKOCYTESUR Negative 08/27/2021    UROBILINOGEN 0.2 08/27/2021    BILIRUBINUR Negative 08/27/2021    BLOODU SMALL 08/27/2021    GLUCOSEU 250 08/27/2021     Microalbumen/Creatinine ratio:  No components found for: RUCREAT  24 Hour Urine for Protein:  No components found for: RAWUPRO, UHRS3, BVQY03DC, UTV3  24 Hour Urine for Creatinine Clearance:  No components found for: CREAT4, UHRS10, UTV10  Urine Toxicology:  No components found for: Emilie Ades, IBENZO, ICOCAINE, IMARTHC, IOPIATES, IPHENCYC    HgBA1c:    Lab Results   Component Value Date    LABA1C 8.9 02/24/2021     RPR:  No results found for: RPR  HIV:  No results found for: HIV  NILSA:    Lab Results   Component Value Date    NILSA Negative 04/25/2020     RF:  No results found for: RF  DSDNA:  No components found for: DNA  AMYLASE:  No results found for: AMYLASE  LIPASE:    Lab Results   Component Value Date    LIPASE 14.0 04/25/2020     Fibrinogen Level:  No components found for: FIB       BELOW MENTIONED RADIOLOGY STUDY RESULTS BY ME (AS NEEDED FOR MY EVALUATION AND MANAGEMENT). CT HEAD WO CONTRAST    Result Date: 8/27/2021  EXAMINATION: CT OF THE HEAD WITHOUT CONTRAST; CT OF THE CHEST WITHOUT CONTRAST  8/27/2021 7:12 pm TECHNIQUE: CT of the head was performed without the administration of intravenous contrast. Dose modulation, iterative reconstruction, and/or weight based adjustment of the mA/kV was utilized to reduce the radiation dose to as low as reasonably achievable.; CT of the chest was performed without the administration of intravenous contrast. Multiplanar reformatted images are provided for review. Dose modulation, iterative reconstruction, and/or weight based adjustment of the mA/kV was utilized to reduce the radiation dose to as low as reasonably achievable. COMPARISON: CT head 08/27/2020. HISTORY: ORDERING SYSTEM PROVIDED HISTORY: AMS TECHNOLOGIST PROVIDED HISTORY: Has a \"code stroke\" or \"stroke alert\" been called? ->No Reason for exam:->AMS Decision Support Exception - unselect if not a suspected or confirmed emergency medical condition->Emergency Medical Condition (MA) Is the patient pregnant?->No Reason for Exam: Respiratory Distress (Pt brought in per [facility] after call from  for resp distress. O2 sat 70's, BMV in route. Pt will open eyes to voice. Pupils 8mm and fixed. ) Acuity: Acute Type of Exam: Initial; ORDERING SYSTEM PROVIDED HISTORY: AMS, respiratory distress TECHNOLOGIST PROVIDED HISTORY: Reason for exam:->AMS, respiratory distress Is the patient pregnant?->No Reason for Exam: Respiratory Distress (Pt brought in per [facility] after call from  for resp distress. O2 sat 70's, BMV in route. Pt will open eyes to voice. Pupils 8mm and fixed. ) Acuity: Acute Type of Exam: Initial CT HEAD FINDINGS: BRAIN/VENTRICLES: No acute hemorrhage.    Periventricular and subcortical aspiration/pneumonia and pulmonary edema. 2. Other findings as described. CT CHEST WO CONTRAST    Result Date: 8/27/2021  EXAMINATION: CT OF THE HEAD WITHOUT CONTRAST; CT OF THE CHEST WITHOUT CONTRAST  8/27/2021 7:12 pm TECHNIQUE: CT of the head was performed without the administration of intravenous contrast. Dose modulation, iterative reconstruction, and/or weight based adjustment of the mA/kV was utilized to reduce the radiation dose to as low as reasonably achievable.; CT of the chest was performed without the administration of intravenous contrast. Multiplanar reformatted images are provided for review. Dose modulation, iterative reconstruction, and/or weight based adjustment of the mA/kV was utilized to reduce the radiation dose to as low as reasonably achievable. COMPARISON: CT head 08/27/2020. HISTORY: ORDERING SYSTEM PROVIDED HISTORY: AMS TECHNOLOGIST PROVIDED HISTORY: Has a \"code stroke\" or \"stroke alert\" been called? ->No Reason for exam:->AMS Decision Support Exception - unselect if not a suspected or confirmed emergency medical condition->Emergency Medical Condition (MA) Is the patient pregnant?->No Reason for Exam: Respiratory Distress (Pt brought in per [facility] after call from  for resp distress. O2 sat 70's, BMV in route. Pt will open eyes to voice. Pupils 8mm and fixed. ) Acuity: Acute Type of Exam: Initial; ORDERING SYSTEM PROVIDED HISTORY: AMS, respiratory distress TECHNOLOGIST PROVIDED HISTORY: Reason for exam:->AMS, respiratory distress Is the patient pregnant?->No Reason for Exam: Respiratory Distress (Pt brought in per [facility] after call from  for resp distress. O2 sat 70's, BMV in route. Pt will open eyes to voice. Pupils 8mm and fixed. ) Acuity: Acute Type of Exam: Initial CT HEAD FINDINGS: BRAIN/VENTRICLES: No acute hemorrhage. Periventricular and subcortical hypoattenuation is nonspecific.   Interval chronic lacunar infarcts in the left corona radiata, thalamus, and internal capsule. Artifact partially obscures the laureano. Artifact partially obscures the inferior cerebellum. Amie Uriel white differentiation appears maintained given artifact near the skull base and through the posterior fossa. Mild prominence of the ventricles noted. Overall ventricle size appears increased from prior exam.  There is no midline shift. Basal cisterns appear patent. ORBITS: Visualized orbits appear unremarkable on this unenhanced exam. SINUSES: Mild mucosal thickening of the ethmoid and sphenoid sinuses. Visualized mastoid air cells appear clear. SOFT TISSUES/SKULL: No depressed calvarial fracture. Fluid in the nasopharynx and oropharynx. CT HEAD IMPRESSION: No acute hemorrhage or midline shift. Increased ventricle size compared to prior exam.  Correlate with presentation to exclude acute hydrocephalus. Other findings as described. CT CHEST FINDINGS: Mediastinum: Heart is borderline enlarged. Trace pericardial effusion or thickening. Aorta is within normal limits in size. Pulmonary arteries are within normal limits in size. . Lungs/pleura: Central airways are patent. Endotracheal tube with tip terminating in the distal 3rd subglottic trachea. Secretions noted in the trachea. Opacification of segmental and subsegmental bronchi. Ground-glass the confluent airspace disease. Interlobular septal thickening. Small to moderate pleural effusions. No pneumothorax. Soft Tissues/Bones: Suboptimal evaluation for adenopathy without intravenous contrast. Mediastinal adenopathy. Diffuse body wall edema. Scattered degenerative changes noted in the visualized spine without spondylolisthesis. Upper Abdomen: Limited images of the upper abdomen are unremarkable given lack of intravenous contrast. CT CHEST IMPRESSION: 1.   1. Multifocal airspace disease that likely represents a combination of aspiration/pneumonia and pulmonary edema. 2. Other findings as described.      XR CHEST PORTABLE    Result Date: 8/28/2021  EXAMINATION: ONE XRAY VIEW OF THE CHEST 8/28/2021 1:21 am COMPARISON: Chest x-ray dated 02/24/2021. Serene Wright HISTORY: ORDERING SYSTEM PROVIDED HISTORY: central line and OG placement TECHNOLOGIST PROVIDED HISTORY: Reason for exam:->central line and OG placement FINDINGS: LINES/TUBES/OTHER: Endotracheal tube is noted with its tip approximately 5 cm from the ana. Enteric tube is noted coursing below the level of the diaphragm and within the stomach. Left IJ central venous catheter is noted with its tip projecting over the area of the left brachiocephalic vein. HEART/MEDIASTINUM: The cardiac silhouette is mildly enlarged, but stable. PLEURA/LUNGS: There is perihilar fullness and increased interstitial markings which may reflect pulmonary vascular congestion in the correct clinical setting. There is left basilar reflecting airspace disease, atelectasis or pleural effusion. There is right basilar airspace disease. There is no appreciable pneumothorax. BONES/SOFT TISSUE: No acute abnormality. Endotracheal tube and enteric tube are in satisfactory position. The left IJ central venous catheter tip projects over the area of the left brachiocephalic vein. Right basilar airspace disease. Left basilar opacification reflecting airspace disease, atelectasis or pleural effusion.                 Conclusions      Summary   *Left ventricle - moderate concentric LVH, normal size and function with EF   of 55%   *Mitral valve - mild regurgitation   *Tricuspid valve - trivial regurgitation   *Bubble study - negative      Signature      ------------------------------------------------------------------   Electronically signed by Thony Stanley MD (Interpreting   physician) on 08/31/2020 at 02:48 PM   ------------------------------------------------------------------      Findings

## 2021-08-28 NOTE — PROGRESS NOTES
Comprehensive Nutrition Assessment    Type and Reason for Visit:  Initial    Nutrition Recommendations/Plan:   Nutrition as appropriate within next 24 - 48 hr.    Nutrition Assessment:  Vent triggered nutrition assessment. Pt intubated and sedated. Hx of CHF and DM. Current wt is estimated at 245 lb. Chart review shows 244 lb in July at physician's office. One year ago pt was 200 lb per bed scale. Will follow for appropriateness of nutrition intervention.     Malnutrition Assessment:  Malnutrition Status:  Insufficient data        Estimated Daily Nutrient Needs:  Energy (kcal):  2416 - 7342; Weight Used for Energy Requirements:  Other (Comment) (111 kg; estimated at admission)     Protein (g):  118; Weight Used for Protein Requirements:  Ideal (2.0 g/59kg)        Fluid (ml/day):   ; Method Used for Fluid Requirements:  1 ml/kcal      Nutrition Related Findings:  +2 nonpitting generalized edema; lasix; K+ 3.3, Mg 1.70, phos 5.1      Wounds:  None       Current Nutrition Therapies:    Diet NPO  Additional Calorie Sources:   propofol at 26.7 mL/hr provides 704 kcal from lipids    Anthropometric Measures:  · Height: 5' 6\" (167.6 cm)  · Current Body Weight: 245 lb (111.1 kg) (estimated upon admission)   · Ideal Body Weight: 130 lbs; % Ideal Body Weight 188.5 %   · BMI: 39.6  · BMI Categories: Obese Class 2 (BMI 35.0 -39.9)       Nutrition Diagnosis:   · Inadequate oral intake related to impaired respiratory function as evidenced by intubation, NPO or clear liquid status due to medical condition      Nutrition Interventions:   Food and/or Nutrient Delivery:  Continue NPO  Nutrition Education/Counseling:  Education not indicated   Coordination of Nutrition Care:  Continue to monitor while inpatient    Goals:  initiation of nutrition as approriate within next 24 - 48 hr       Nutrition Monitoring and Evaluation:   Food/Nutrient Intake Outcomes:  Diet Advancement/Tolerance, IVF Intake  Physical Signs/Symptoms Outcomes:  Biochemical Data, Weight     Discharge Planning:     Too soon to determine     Electronically signed by Bravo Portillo RD, LD on 8/28/21 at 10:53 AM EDT  Weekend Contact: [phone], leave name and callback number

## 2021-08-28 NOTE — PLAN OF CARE
Nutrition Problem #1: Inadequate oral intake  Intervention: Food and/or Nutrient Delivery: Continue NPO  Nutritional Goals: initiation of nutrition as approriate within next 24 - 48 hr

## 2021-08-28 NOTE — ED NOTES
Report given to [id number] on [facility].       Karri Bernal RN  08/27/21 [id number]

## 2021-08-28 NOTE — H&P
Hospital Medicine History & Physical      PCP: Chelle Burrell    Date of Admission: 8/27/2021    Date of Service: Pt seen/examined on 8/27/2021  Pt seen/examined face to face on and admitted as inpatient with expected LOS greater than two midnights due to medical therapy    Chief Complaint:    Chief Complaint   Patient presents with    Respiratory Distress     Pt brought in per [facility] after call from  for resp distress. O2 sat 70's, BMV in route. Pt will open eyes to voice. Pupils 8mm and fixed. History Of Present Illness:      55 y.o. female who presented to [facility] with past medical history of hypertension, type 2 diabetes, CKD stage IV, HFpEF, hyperlipidemia presented to the ED with chief complaint of respiratory distress. History cannot be obtained due to patient being intubated sedated. On chart review patient had 3 or 4 arrival sitting in bed and also having some new onset dyspnea that was severe sudden onset and then started progressively turning blue in the lips for her  and EMS was called noted to have saturation 60% on muscles brought here emergently. Patient in the ED noted was unable to protect her airway thus was emergently intubated for lifesaving measures. Past Medical History:          Diagnosis Date    Blind in both eyes     Cerebral artery occlusion with cerebral infarction ([address]     CHF (congestive heart failure) (HCC)     Chronic kidney disease     Depression     Diabetes mellitus out of control ([address]     Diabetes mellitus, type II ([address]     2005    Diabetic neuropathy associated with type 2 diabetes mellitus ([address]     Generalized headaches     Hypertension     Infertility     Insomnia     chronic vs lack of time spent to sleep    Migraine headache 11/09/2011    Mixed hyperlipidemia     Otitis media     h/o recurrent    Pelvic abscess in female 10/05/2013    Pneumonia     2004 approx.     Stroke [facility]) 08/27/2020 Past Surgical History:          Procedure Laterality Date    CERVIX SURGERY      laser tx for dysplasia;1992    EYE SURGERY      FOOT SURGERY Right     FOOT SURGERY Bilateral     FOOT SURGERY Left        Medications Prior to Admission:      Prior to Admission medications    Medication Sig Start Date End Date Taking? Authorizing Provider   Dulaglutide 0.75 MG/0.5ML SOPN Inject 0.75 mg into the skin once a week 7/8/21   Damon Mireles   ibuprofen (ADVIL;MOTRIN) 200 MG CAPS Take 1 capsule by mouth    Historical Provider, MD   furosemide (LASIX) 80 MG tablet Take 80 mg by mouth every morning 80mg in the morning 40mg in the evening    Historical Provider, MD   amLODIPine (NORVASC) 10 MG tablet Take 1 tablet by mouth daily 7/8/21   Damon Mireles   furosemide (LASIX) 40 MG tablet Take 1 tablet by mouth every evening 7/8/21   Damon Mireles   spironolactone (ALDACTONE) 50 MG tablet Take 1 tablet by mouth daily 7/8/21   Damon Mireles   furosemide (LASIX) 80 MG tablet Take 1 tablet by mouth daily 7/8/21   Damon Mireles   insulin glargine (LANTUS;BASAGLAR) 100 UNIT/ML injection pen Inject 30 Units into the skin daily 7/8/21   Damon Mireles   lisinopril (PRINIVIL;ZESTRIL) 40 MG tablet Take 1 tablet by mouth daily 7/8/21   Damon Mireles   atorvastatin (LIPITOR) 40 MG tablet Take 1 tablet by mouth nightly 7/8/21   Damon Mireles   Insulin Pen Needle 29G X 12.7MM MISC 1 each by Does not apply route daily 7/8/21   Damon Mireles   propranolol (INDERAL LA) 80 MG extended release capsule Take 1 capsule by mouth every morning 7/8/21   Damon Mireles   nicotine (NICODERM CQ) 21 MG/24HR Place 1 patch onto the skin daily 2/27/21   Mesfin Howard MD   LORazepam (ATIVAN) 0.5 MG tablet Take 0.5 mg by mouth 2 times daily as needed for Anxiety.     Historical Provider, MD   aspirin 81 MG EC tablet Take 1 tablet by mouth daily 9/1/20   Tim Smith MD   pregabalin (LYRICA) 75 MG capsule Take 1 capsule by mouth nightly for 7 days. 8/31/20 7/8/21  Celestina Pascual MD   sertraline (ZOLOFT) 50 MG tablet Take 1 tablet by mouth daily 9/1/20   Celestina Pascual MD   glucose monitoring kit (FREESTYLE) monitoring kit 1 kit by Does not apply route daily 5/13/20   Dianah Najjar, MD   insulin lispro, 1 Unit Dial, 100 UNIT/ML SOPN Inject 0-6 Units into the skin 3 times daily (with meals) **Corrective Low Dose Algorithm**  Glucose: Dose:               No Insulin  140-199 1 Unit  200-249 2 Units  250-299 3 Units  300-349 4 Units  350-399 5 Units  Over 399 6 Units  Patient taking differently: Inject 6-12 Units into the skin 3 times daily (with meals) **Corrective Low Dose Algorithm**  Glucose: Dose:               No Insulin  140-199 1 Unit  200-249 2 Units  250-299 3 Units  300-349 4 Units  350-399 5 Units  Over 399 6 Units 4/29/20   Kaelyn Raza MD   glucose blood VI test strips (VICTORY AGM-4000 TEST) strip Test four times daily until controlled and then three times daily. Code 250.02  ONE TOUCH ULTRA MONITOR 10/15/13 2/23/21  Grady Leiva MD       Allergies:  Amoxicillin, Levofloxacin, Levofloxacin, Vancomycin, and Tape Cherri Jewels tape]    Social History:          TOBACCO:   reports that she has been smoking cigarettes. She has been smoking about 1.00 pack per day. She has never used smokeless tobacco.  ETOH:   reports no history of alcohol use.   E-Cigarettes/Vaping Use     Questions Responses    E-Cigarette/Vaping Use Never User    Start Date     Passive Exposure     Quit Date     Counseling Given     Comments             Family History:      Reviewed in detail         Problem Relation Age of Onset    Diabetes Mother    [employer] Other Mother 79        Covid    Diabetes Father     High Blood Pressure Father     Colon Cancer Father     Diabetes Sister     Alcohol Abuse Maternal Grandfather     Diabetes Paternal Grandmother     Alcohol Abuse Paternal Grandfather     Diabetes Paternal Aunt     Diabetes Paternal Uncle        REVIEW OF SYSTEMS:   Unable to obtain patient encephalopathi, sedated  PHYSICAL EXAM PERFORMED:    BP (!) 166/80   Pulse 77   Temp 96 °F (35.6 °C) (Rectal)   Resp 16   Wt 245 lb (111.1 kg)   SpO2 99%   BMI 39.54 kg/m²     General appearance:  mild acute distress, appears older than stated age  HEENT:   atraumatic, sclera anicteric, Conjunctivae clear. Neck: Supple,Trachea midline, no goiter  Respiratory:minimal accessory muscle usage, Normal respiratory effort. Patient will have ventilated on vent. Cardiovascular:  Regular rate and rhythm, capillary refill 2 seconds  Abdomen: Soft, non-tender, non-distended with normal bowel sounds. Musculoskeletal:  No clubbing, cyanosis. trace edema LE bilaterally. Skin: turgor normal.  No new rashes or lesions. Neurologic: Sedated on vent.   GCS 3 T    Bedside ultrasound showed IVC with adequate intravascular volume, with suspected right heart strain labs:     Recent Labs     08/27/21 1858   WBC 21.0*   HGB 9.6*   HCT 30.2*        Recent Labs     08/27/21  1858      K 3.9      CO2 17*   BUN 37*   CREATININE 4.6*   CALCIUM 8.4     Recent Labs     08/27/21  1858   AST 37   ALT 20   BILITOT <0.2   ALKPHOS 167*     Recent Labs     08/27/21 1858   INR 0.99     Recent Labs     08/27/21  1858 08/27/21  2342   TROPONINI 0.24* 0.22*       Urinalysis:      Lab Results   Component Value Date    NITRU Negative 08/27/2021    WBCUA 7 08/27/2021    RBCUA 3 08/27/2021    BLOODU SMALL 08/27/2021    SPECGRAV 1.013 08/27/2021    GLUCOSEU 250 08/27/2021       Radiology:     CXR: I have reviewed the CXR with the following interpretation:     EKG:  I have reviewed the EKG with the following interpretation:   Normal sinus rhythm    CT CHEST WO CONTRAST   Final Result      CT HEAD WO CONTRAST   Final Result      NM LUNG VENT/PERFUSION (VQ)    (Results Pending)   XR CHEST PORTABLE    (Results Pending)       ASSESSMENT AND PLAN:    ISA/Rola Shelton [id number] Problems    Diagnosis Date Noted    Acute respiratory failure ([facility] [J96.00] 08/27/2021     Acute hypoxic respiratory failure: Suspected pulmonary embolism  Responsive to mechanical ventilation  Continue to monitor    Suspected pulmonary embolism:  Formal echocardiogram to check for heart strain  Unable to perform CTA due to acute kidney function  VQ scan ordered  Empirically on heparin    Acute on chronic HFpEF exacerbation:  Lasix drip initiated    Pneumonia:  Suspected on CT imaging with leukocytosis  Patient will be empirically treated with Zyvox and cefepime. Hypertensive urgency:  Currently on Lasix drip continue to monitor    Acute on chronic renal failure:  Nephrology consulted, much appreciated    Normocytic anemia:  Iron panel ordered, Hemoccult    Proteinuria: Protein to creatinine ratio pending to check for nephrotic syndrome    Chronic medical conditions:  Type 2 Diabetes: Insulin sliding scale  Hyperlipidemia: Continue home medication  Class II obesity:Complicating assessment and treatment. Placing patient at risk for multiple co-morbidities as well as early death and contributing to the patient's presentation. Education, and counseling    Diet: NPO except meds ordered    DVT Prophylaxis: Heparin drip    Dispo:   Expected LOS greater than two [facility]     Due to the immediate potential for life-threatening deterioration due to acute respiratory failure, I spent  33 minutes providing critical care. This time is excluding time spent performing procedures.

## 2021-08-28 NOTE — PROGRESS NOTES
Patient received from [facility]. Stable on vent, sedated, restraint's cont'd due to pulling at lines. VSS. Family was updated of transfer. Will continue to monitor.

## 2021-08-29 ENCOUNTER — APPOINTMENT (OUTPATIENT)
Dept: GENERAL RADIOLOGY | Age: 46
DRG: 720 | End: 2021-08-29
Payer: COMMERCIAL

## 2021-08-29 LAB
A/G RATIO: 0.7 (ref 1.1–2.2)
ALBUMIN SERPL-MCNC: 2 G/DL (ref 3.4–5)
ALP BLD-CCNC: 108 U/L (ref 40–129)
ALT SERPL-CCNC: 9 U/L (ref 10–40)
ANION GAP SERPL CALCULATED.3IONS-SCNC: 13 MMOL/L (ref 3–16)
ANISOCYTOSIS: ABNORMAL
APTT: 65.5 SEC (ref 26.2–38.6)
APTT: 70.6 SEC (ref 26.2–38.6)
AST SERPL-CCNC: 11 U/L (ref 15–37)
BASOPHILS ABSOLUTE: 0.2 K/UL (ref 0–0.2)
BASOPHILS RELATIVE PERCENT: 1 %
BILIRUB SERPL-MCNC: <0.2 MG/DL (ref 0–1)
BUN BLDV-MCNC: 43 MG/DL (ref 7–20)
CALCIUM IONIZED: 1.02 MMOL/L (ref 1.12–1.32)
CALCIUM SERPL-MCNC: 6.4 MG/DL (ref 8.3–10.6)
CHLORIDE BLD-SCNC: 109 MMOL/L (ref 99–110)
CO2: 16 MMOL/L (ref 21–32)
CREAT SERPL-MCNC: 5.3 MG/DL (ref 0.6–1.1)
EOSINOPHILS ABSOLUTE: 0.6 K/UL (ref 0–0.6)
EOSINOPHILS RELATIVE PERCENT: 4 %
GFR AFRICAN AMERICAN: 11
GFR NON-AFRICAN AMERICAN: 9
GLOBULIN: 3 G/DL
GLUCOSE BLD-MCNC: 114 MG/DL (ref 70–99)
GLUCOSE BLD-MCNC: 114 MG/DL (ref 70–99)
GLUCOSE BLD-MCNC: 123 MG/DL (ref 70–99)
GLUCOSE BLD-MCNC: 135 MG/DL (ref 70–99)
GLUCOSE BLD-MCNC: 98 MG/DL (ref 70–99)
HCT VFR BLD CALC: 22.1 % (ref 36–48)
HCT VFR BLD CALC: 22.6 % (ref 36–48)
HEMOGLOBIN: 7.3 G/DL (ref 12–16)
HEMOGLOBIN: 7.3 G/DL (ref 12–16)
L. PNEUMOPHILA SEROGP 1 UR AG: NORMAL
LACTIC ACID: 0.6 MMOL/L (ref 0.4–2)
LYMPHOCYTES ABSOLUTE: 3 K/UL (ref 1–5.1)
LYMPHOCYTES RELATIVE PERCENT: 19 %
MAGNESIUM: 2 MG/DL (ref 1.8–2.4)
MCH RBC QN AUTO: 29.1 PG (ref 26–34)
MCHC RBC AUTO-ENTMCNC: 32.8 G/DL (ref 31–36)
MCV RBC AUTO: 88.7 FL (ref 80–100)
MONOCYTES ABSOLUTE: 0.6 K/UL (ref 0–1.3)
MONOCYTES RELATIVE PERCENT: 4 %
MRSA SCREEN RT-PCR: NORMAL
NEUTROPHILS ABSOLUTE: 11.4 K/UL (ref 1.7–7.7)
NEUTROPHILS RELATIVE PERCENT: 72 %
PDW BLD-RTO: 15.9 % (ref 12.4–15.4)
PERFORMED ON: ABNORMAL
PERFORMED ON: NORMAL
PH VENOUS: 7.36 (ref 7.35–7.45)
PHOSPHORUS: 5 MG/DL (ref 2.5–4.9)
PLATELET # BLD: 251 K/UL (ref 135–450)
PLATELET SLIDE REVIEW: ADEQUATE
PMV BLD AUTO: 9.1 FL (ref 5–10.5)
POLYCHROMASIA: ABNORMAL
POTASSIUM SERPL-SCNC: 3.5 MMOL/L (ref 3.5–5.1)
RBC # BLD: 2.49 M/UL (ref 4–5.2)
SLIDE REVIEW: ABNORMAL
SODIUM BLD-SCNC: 138 MMOL/L (ref 136–145)
STREP PNEUMONIAE ANTIGEN, URINE: NORMAL
TOTAL CK: 66 U/L (ref 26–192)
TOTAL PROTEIN: 5 G/DL (ref 6.4–8.2)
TSH REFLEX: 0.92 UIU/ML (ref 0.27–4.2)
WBC # BLD: 15.8 K/UL (ref 4–11)

## 2021-08-29 PROCEDURE — 71045 X-RAY EXAM CHEST 1 VIEW: CPT

## 2021-08-29 PROCEDURE — 85014 HEMATOCRIT: CPT

## 2021-08-29 PROCEDURE — 85018 HEMOGLOBIN: CPT

## 2021-08-29 PROCEDURE — 85025 COMPLETE CBC W/AUTO DIFF WBC: CPT

## 2021-08-29 PROCEDURE — 6360000002 HC RX W HCPCS: Performed by: INTERNAL MEDICINE

## 2021-08-29 PROCEDURE — C9113 INJ PANTOPRAZOLE SODIUM, VIA: HCPCS | Performed by: INTERNAL MEDICINE

## 2021-08-29 PROCEDURE — 99291 CRITICAL CARE FIRST HOUR: CPT | Performed by: INTERNAL MEDICINE

## 2021-08-29 PROCEDURE — 83605 ASSAY OF LACTIC ACID: CPT

## 2021-08-29 PROCEDURE — 94003 VENT MGMT INPAT SUBQ DAY: CPT

## 2021-08-29 PROCEDURE — 82550 ASSAY OF CK (CPK): CPT

## 2021-08-29 PROCEDURE — 2580000003 HC RX 258: Performed by: INTERNAL MEDICINE

## 2021-08-29 PROCEDURE — 83735 ASSAY OF MAGNESIUM: CPT

## 2021-08-29 PROCEDURE — 2100000000 HC CCU R&B

## 2021-08-29 PROCEDURE — 84100 ASSAY OF PHOSPHORUS: CPT

## 2021-08-29 PROCEDURE — 80053 COMPREHEN METABOLIC PANEL: CPT

## 2021-08-29 PROCEDURE — 82330 ASSAY OF CALCIUM: CPT

## 2021-08-29 PROCEDURE — 94761 N-INVAS EAR/PLS OXIMETRY MLT: CPT

## 2021-08-29 PROCEDURE — 6360000002 HC RX W HCPCS: Performed by: EMERGENCY MEDICINE

## 2021-08-29 PROCEDURE — 85730 THROMBOPLASTIN TIME PARTIAL: CPT

## 2021-08-29 PROCEDURE — 2000000000 HC ICU R&B

## 2021-08-29 PROCEDURE — 2700000000 HC OXYGEN THERAPY PER DAY

## 2021-08-29 PROCEDURE — 36592 COLLECT BLOOD FROM PICC: CPT

## 2021-08-29 RX ORDER — FUROSEMIDE 10 MG/ML
60 INJECTION INTRAMUSCULAR; INTRAVENOUS 3 TIMES DAILY
Status: DISCONTINUED | OUTPATIENT
Start: 2021-08-29 | End: 2021-08-30

## 2021-08-29 RX ADMIN — Medication 10 ML: at 21:14

## 2021-08-29 RX ADMIN — PROPOFOL 50 MCG/KG/MIN: 10 INJECTION, EMULSION INTRAVENOUS at 02:17

## 2021-08-29 RX ADMIN — FUROSEMIDE 60 MG: 10 INJECTION, SOLUTION INTRAMUSCULAR; INTRAVENOUS at 15:21

## 2021-08-29 RX ADMIN — PROPOFOL 50 MCG/KG/MIN: 10 INJECTION, EMULSION INTRAVENOUS at 08:00

## 2021-08-29 RX ADMIN — PANTOPRAZOLE SODIUM 40 MG: 40 INJECTION, POWDER, FOR SOLUTION INTRAVENOUS at 21:06

## 2021-08-29 RX ADMIN — Medication 10 ML: at 09:00

## 2021-08-29 RX ADMIN — HEPARIN SODIUM 13 UNITS/KG/HR: 10000 INJECTION, SOLUTION INTRAVENOUS at 15:05

## 2021-08-29 RX ADMIN — PROPOFOL 50 MCG/KG/MIN: 10 INJECTION, EMULSION INTRAVENOUS at 15:40

## 2021-08-29 RX ADMIN — FUROSEMIDE 60 MG: 10 INJECTION, SOLUTION INTRAMUSCULAR; INTRAVENOUS at 10:00

## 2021-08-29 RX ADMIN — PROPOFOL 50 MCG/KG/MIN: 10 INJECTION, EMULSION INTRAVENOUS at 12:25

## 2021-08-29 RX ADMIN — LINEZOLID 600 MG: 600 INJECTION, SOLUTION INTRAVENOUS at 23:01

## 2021-08-29 RX ADMIN — LINEZOLID 600 MG: 600 INJECTION, SOLUTION INTRAVENOUS at 12:27

## 2021-08-29 RX ADMIN — PANTOPRAZOLE SODIUM 40 MG: 40 INJECTION, POWDER, FOR SOLUTION INTRAVENOUS at 09:00

## 2021-08-29 RX ADMIN — PROPOFOL 50 MCG/KG/MIN: 10 INJECTION, EMULSION INTRAVENOUS at 21:15

## 2021-08-29 RX ADMIN — CEFEPIME HYDROCHLORIDE 2000 MG: 2 INJECTION, POWDER, FOR SOLUTION INTRAVENOUS at 11:16

## 2021-08-29 RX ADMIN — FUROSEMIDE 60 MG: 10 INJECTION, SOLUTION INTRAMUSCULAR; INTRAVENOUS at 21:06

## 2021-08-29 ASSESSMENT — PAIN SCALES - GENERAL
PAINLEVEL_OUTOF10: 0

## 2021-08-29 ASSESSMENT — PULMONARY FUNCTION TESTS
PIF_VALUE: 29
PIF_VALUE: 27
PIF_VALUE: 28
PIF_VALUE: 26
PIF_VALUE: 28
PIF_VALUE: 29
PIF_VALUE: 31
PIF_VALUE: 29
PIF_VALUE: 30

## 2021-08-29 NOTE — PROGRESS NOTES
MD Bacilio Recinos MD Lauretta Nakayama, MD               Office: [phone]                      Fax: [phone]             [facility]                   NEPHROLOGY CVU INPATIENT PROGRESS NOTE:     PATIENT NAME: Mik Cruz  : 1975  MRN: [id number]       RECOMMENDATIONS:   -Rx: IV lasix 60 TID, follow response, might add Metolazone   -?LARRY, w/ no h/o resistant HTN,   - check RAA levels, w/ h/o hypokalemia   -Hold home dose of aldactone, lisinopril,  -Follow echo results   -Last echo-2020  moderate concentric LVH, normal size and function with EF   of 36%, normal diastolic function  -PNA Rx w/ iv abx    -BP is improving w/ hydralazine, use PRN for >150,  -vent mx per [facility]    -No urgent indication for dialysis currently, but needs close monitoring, as might need insulin next 1-2 days, if no significant improvement with aggressive diuresis. - at higher risk for decompensation, needing closer monitoring.     D/C plan from renal stand point:  - unclear, as critically ill     Discussed with patient's treatment team- hospitalist, [facility],      IMPRESSION:       Admitted for:  Acute respiratory failure ([address] [J96.00]  Encephalopathy [G93.40]  Respiratory failure with hypoxia, unspecified chronicity ([address] [J96.91]  Pneumonia due to infectious organism, unspecified laterality, unspecified part of lung [J18.9]      KINZA (on proteinuric CKD: 4):  - BL Scr-last known 2.5, in 2021,   ---> 4.6 on admission  -: Etiology of KINZA -presumed ATN in the setting of pneumonia, unclear if it this is advancing CKD  -COVID was ruled out    History of nephrotic range proteinuria,   - thought to be from diabetic nephropathy With CKD stage III  -Follows with Dr. Sukhjinder Parish  -Noncompliance, last follow-up was 2021 then lost for follow-up        Associated problems:   Hypokalemia-needing repletion  Acidosis, non-anion gap-  Hypomagnesemia-  Hypophosphatemia  Anemia, chronic disease-worsening        Other major problems: Management per primary and other consulting teams.   //Acute hypoxic respiratory failure -needing intubation  // Multifocal airspace disease that likely represents a combination of aspiration/pneumonia and pulmonary edema  //Fluid overload    //History of uncontrolled diabetes last A1c was 11.3    // Hypertension un-controlled,       Hospital Problems         Last Modified POA    Pulmonary edema 8/28/2021 Yes    Chronic kidney disease (CKD), stage III (moderate) ([facility]) 8/28/2021 Yes    Chronic diastolic (congestive) heart failure ([facility] 8/28/2021 Yes    Acute on chronic diastolic heart failure ([facility] 8/28/2021 Yes    Acute respiratory failure ([facility] 8/27/2021 Yes        : other supportive care :   - Check daily renal function panel with electrolytes-phosphorus  - Strict monitoring of I/Os, daily weight  - Renal feeds/diet  - Current medications reviewed. - Nephrotoxic medications have been discontinued. - Dose adjusted and appropriate. - Dose meds for eGFR <15 mL/min/1.73m2 during KINZA    - Avoid heavy opioids due to renal failure - may use very low dose dilaudid / fentanyl with close monitoring of CNS and respiratory depression. Please refer to the orders. High Complexity. Multiple complex problems. Discussed with patient 's treatment team- ICU team, nurse     Thank you for allowing me to participate in this patient's care. Please do not hesitate to contact me anytime. We will follow along with you. Ayleen Fischer MD,  [facility] [address] [place]: [phone] or [address] [employer]  Fax: [phone]     Severally ill, at risk of impending organ failure needing ICU higher level of care/monitoring.    Time spent  35 minutes that included face-to-face meeting/discussion with patient's treatment team (including primary/referring team and other consultants; included coordination of care with the treatment team; and review of patient's electronic medical records and ordering appropriates tests.         ========================================================   ========================================================   Subjective:   Remains critically ill, intubated sedated, 30% FiO2  Now in CVU  Good urine output with Lasix IV  Past medical, Surgical, Social, Family medical history reviewed by me. MEDICATIONS: reviewed by me. Medications Prior to Admission:  No current facility-administered medications on file prior to encounter. Current Outpatient Medications on File Prior to Encounter   Medication Sig Dispense Refill    Dulaglutide 0.75 MG/0.5ML SOPN Inject 0.75 mg into the skin once a week 4 pen 1    ibuprofen (ADVIL;MOTRIN) 200 MG CAPS Take 1 capsule by mouth      furosemide (LASIX) 80 MG tablet Take 80 mg by mouth every morning 80mg in the morning 40mg in the evening      amLODIPine (NORVASC) 10 MG tablet Take 1 tablet by mouth daily 30 tablet 1    furosemide (LASIX) 40 MG tablet Take 1 tablet by mouth every evening 30 tablet 1    spironolactone (ALDACTONE) 50 MG tablet Take 1 tablet by mouth daily 30 tablet 2    furosemide (LASIX) 80 MG tablet Take 1 tablet by mouth daily 60 tablet 1    insulin glargine (LANTUS;BASAGLAR) 100 UNIT/ML injection pen Inject 30 Units into the skin daily 4 pen 2    lisinopril (PRINIVIL;ZESTRIL) 40 MG tablet Take 1 tablet by mouth daily 30 tablet 2    atorvastatin (LIPITOR) 40 MG tablet Take 1 tablet by mouth nightly 30 tablet 3    Insulin Pen Needle 29G X 12.7MM MISC 1 each by Does not apply route daily 100 each 5    propranolol (INDERAL LA) 80 MG extended release capsule Take 1 capsule by mouth every morning 30 capsule 2    nicotine (NICODERM CQ) 21 MG/24HR Place 1 patch onto the skin daily 30 patch 2    LORazepam (ATIVAN) 0.5 MG tablet Take 0.5 mg by mouth 2 times daily as needed for Anxiety.       aspirin 81 MG EC tablet Take 1 tablet by mouth daily 30 tablet 3    pregabalin (LYRICA) 75 MG capsule Take 1 capsule by mouth nightly for 7 days. 7 capsule 0    sertraline (ZOLOFT) 50 MG tablet Take 1 tablet by mouth daily 30 tablet 3    glucose monitoring kit (FREESTYLE) monitoring kit 1 kit by Does not apply route daily 1 kit 0    insulin lispro, 1 Unit Dial, 100 UNIT/ML SOPN Inject 0-6 Units into the skin 3 times daily (with meals) **Corrective Low Dose Algorithm**  Glucose: Dose:               No Insulin  140-199 1 Unit  200-249 2 Units  250-299 3 Units  300-349 4 Units  350-399 5 Units  Over 399 6 Units (Patient taking differently: Inject 6-12 Units into the skin 3 times daily (with meals) **Corrective Low Dose Algorithm**  Glucose: Dose:               No Insulin  140-199 1 Unit  200-249 2 Units  250-299 3 Units  300-349 4 Units  350-399 5 Units  Over 399 6 Units) 3 pen 0    glucose blood VI test strips (VICTORY AGM-4000 TEST) strip Test four times daily until controlled and then three times daily.   Code 250.02  ONE TOUCH ULTRA MONITOR 100 strip 12         Current Facility-Administered Medications:     insulin lispro (1 Unit Dial) 0-6 Units, 0-6 Units, Subcutaneous, Q4H, Ahmad A Oleg, DO    glucose (GLUTOSE) 40 % oral gel 15 g, 15 g, Oral, PRN, Ahmad A Oleg, DO    dextrose 50 % IV solution, 12.5 g, IntraVENous, PRN, Ahmad A Oleg, DO    glucagon (rDNA) injection 1 mg, 1 mg, IntraMUSCular, PRN, Ahmad A Oleg, DO    dextrose 5 % solution, 100 mL/hr, IntraVENous, PRN, Ahmad A Oleg, DO    potassium chloride 20 mEq/50 mL IVPB (Central Line), 20 mEq, IntraVENous, PRN, Nancy Crowe MD    pantoprazole (PROTONIX) injection 40 mg, 40 mg, IntraVENous, BID, Nancy Crowe MD, 40 mg at 08/28/21 2242    propofol injection, 5-50 mcg/kg/min, IntraVENous, Titrated, Cely Blanchard MD, Last Rate: 33.3 mL/hr at 08/29/21 0217, 50 mcg/kg/min at 08/29/21 0217    fentaNYL (SUBLIMAZE) injection 50 mcg, 50 mcg, IntraVENous, Q1H PRN, Cely Blanchard MD    heparin (porcine) injection 9,670 Units, 80 Units/kg, IntraVENous, PRN, Leni Andrade MD    heparin (porcine) injection 4,440 Units, 40 Units/kg, IntraVENous, PRN, Leni Andrade MD    heparin 25,000 units in dextrose 5% 250 mL (premix) infusion, 5-30 Units/kg/hr, IntraVENous, Continuous, Leni Andrade MD, Last Rate: 14.4 mL/hr at 08/29/21 0057, 13 Units/kg/hr at 08/29/21 0057    sodium chloride flush 0.9 % injection 5-40 mL, 5-40 mL, IntraVENous, 2 times per day, Brittany Dejesus DO, 10 mL at 08/28/21 2252    sodium chloride flush 0.9 % injection 5-40 mL, 5-40 mL, IntraVENous, PRN, Brittany Dejesus DO    0.9 % sodium chloride infusion, 25 mL, IntraVENous, PRN, Brittany Dejesus DO, Last Rate: 25 mL/hr at 08/28/21 0307, 25 mL at 08/28/21 0307    ondansetron (ZOFRAN-ODT) disintegrating tablet 4 mg, 4 mg, Oral, Q8H PRN **OR** ondansetron (ZOFRAN) injection 4 mg, 4 mg, IntraVENous, Q6H PRN, Talya Dejesus DO    polyethylene glycol (GLYCOLAX) packet 17 g, 17 g, Oral, Daily PRN, Talya Dejesus DO    acetaminophen (TYLENOL) tablet 650 mg, 650 mg, Oral, Q6H PRN **OR** acetaminophen (TYLENOL) suppository 650 mg, 650 mg, Rectal, Q6H PRN, Talya Dejesus DO    linezolid (ZYVOX) IVPB 600 mg, 600 mg, IntraVENous, Q12H, Brittany Dejesus DO, Stopped at 08/29/21 0021    cefepime (MAXIPIME) 2000 mg IVPB minibag, 2,000 mg, IntraVENous, Q12H, Brittany Dejesus DO, Stopped at 08/28/21 2315    hydrALAZINE (APRESOLINE) injection 10 mg, 10 mg, IntraVENous, Q4H PRN, Brittany Dejesus, , 10 mg at 08/28/21 1121      REVIEW OF SYSTEMS:     Review of systems not obtained due to patient factors - intubation       PHYSICAL EXAM:  Recent vital signs and recent I/Os reviewed by me.      Wt Readings from Last 3 Encounters:   08/29/21 230 lb 9.6 oz (104.6 kg)   07/08/21 244 lb 11.2 oz (111 kg)   02/26/21 237 lb 3.2 oz (107.6 kg)     BP Readings from Last 3 Encounters:   08/29/21 (!) 152/71   07/08/21 (!) 160/100   02/26/21 133/86     Patient Vitals for the past 24 hrs:   BP Temp Temp src Pulse Resp SpO2 Weight   08/29/21 0832 -- -- -- -- 16 96 % --   08/29/21 0430 -- -- -- 81 -- -- --   08/29/21 0400 (!) 152/71 98.2 °F (36.8 °C) Bladder 79 16 96 % 230 lb 9.6 oz (104.6 kg)   08/29/21 0318 -- -- -- 79 16 96 % --   08/29/21 0200 (!) 153/71 -- -- 81 16 96 % --   08/29/21 0100 (!) 163/76 -- -- 83 16 96 % --   08/29/21 0045 -- -- -- 84 16 96 % --   08/29/21 0030 (!) 165/74 98.7 °F (37.1 °C) Bladder 83 17 96 % --   08/29/21 0015 -- -- -- 84 16 97 % --   08/29/21 0000 (!) 166/74 -- -- 83 16 97 % --   08/28/21 2353 -- -- -- 84 15 97 % --   08/28/21 2345 -- -- -- 85 16 97 % --   08/28/21 2330 (!) 170/75 -- -- 84 16 97 % --   08/28/21 2315 -- -- -- 84 16 96 % --   08/28/21 2300 (!) 170/76 -- -- 84 16 95 % --   08/28/21 2245 -- -- -- 82 16 96 % --   08/28/21 2230 (!) 159/70 -- -- 81 16 95 % --   08/28/21 2200 (!) 169/73 -- -- 83 16 96 % --   08/28/21 2145 -- -- -- 82 16 96 % --   08/28/21 2130 (!) 154/69 -- -- 81 16 95 % --   08/28/21 2115 -- -- -- 81 16 96 % --   08/28/21 2100 (!) 158/71 -- -- 80 16 95 % --   08/28/21 2045 -- -- -- 80 16 95 % --   08/28/21 2030 (!) 155/69 98.5 °F (36.9 °C) Bladder 81 16 95 % --   08/28/21 2000 (!) 161/71 -- -- 82 16 96 % --   08/28/21 1945 -- -- -- 82 16 96 % --   08/28/21 1930 (!) 156/70 -- -- 81 16 95 % --   08/28/21 1922 -- -- -- 77 16 95 % --   08/28/21 1652 -- -- -- 77 16 100 % --   08/28/21 1600 (!) 141/65 -- -- 76 16 100 % --   08/28/21 1500 (!) 153/68 96.6 °F (35.9 °C) Bladder 80 16 100 % --   08/28/21 1400 (!) 142/67 -- -- 81 16 99 % --   08/28/21 1300 (!) 162/73 -- -- 88 17 99 % --   08/28/21 1200 (!) 158/75 -- -- 89 18 97 % --   08/28/21 1121 (!) 182/85 -- -- -- -- -- --   08/28/21 1103 -- 97.9 °F (36.6 °C) Bladder -- -- -- --   08/28/21 1100 (!) 176/88 -- -- 82 16 94 % --   08/28/21 1000 (!) 168/88 -- -- 82 16 95 % --   08/28/21 0958 -- 98.2 °F (36.8 °C) -- -- -- -- --   08/28/21 0903 -- -- -- -- 16 96 % --   08/28/21 0900 (!) 155/83 -- -- 81 16 96 % -- Intake/Output Summary (Last 24 hours) at 8/29/2021 0855  Last data filed at 8/29/2021 9230  Gross per 24 hour   Intake 623.22 ml   Output 1720 ml   Net -1096.78 ml          Constitutional: Poorly responsive, Intubated and  obese habitus  Eyes: Conjunctiva clear and Noscleral icterus  Ear, Nose, and Throat: moist oral mucosa; ET to vent  Neck: Trachea midline,  No jugular venous distension  Cardiovascular: Regular rate and ryhthm, normal S1 and S2,    Respiratory: Mechanically ventilated; Lung sounds notable for synchronous vent-associated breath sounds  Abdomen:  distended. Normal bowel sounds. : Meza catheter is inserted, draining urine  Skin: no rash,  bruises on visible body area  Musculoskeletal: No clubbing or cyanosis of digits. and Normocephalic. Extremitties: +++ peripheral edema, no deformities. Neurologic:Unable to obtain as intubated/sedated. Psychiatric: Unable to obtain as intubated/sedated. DATA:  Diagnostic tests reviewed by me for today's visit:   (AS NEEDED FOR MY EVALUATION AND MANAGEMENT). Recent Labs     08/27/21 1858 08/28/21  0420 08/28/21  1303 08/29/21  0415   WBC 21.0* 14.7*  --  15.8*   HCT 30.2* 25.5* 24.3* 22.1*    210  --  251     Iron Saturation:  No components found for: PERCENTFE  FERRITIN:    Lab Results   Component Value Date    FERRITIN 112.0 08/28/2021     IRON:    Lab Results   Component Value Date    IRON 22 08/28/2021     TIBC:    Lab Results   Component Value Date    TIBC 184 08/28/2021       Recent Labs     08/27/21  1858 08/28/21  0420 08/29/21  0415    138 138   K 3.9 3.3* 3.5    108 109   CO2 17* 16* 16*   BUN 37* 40* 43*   CREATININE 4.6* 4.8* 5.3*     Recent Labs     08/27/21  1858 08/28/21  0420 08/29/21  0415   CALCIUM 8.4 7.6* 6.4*   MG  --  1.70* 2.00   PHOS  --  5.1* 5.0*     No results for input(s): PH, PCO2, PO2 in the last 72 hours.     Invalid input(s): [id number], INSPIREDO2    ABG:  No results found for: PH, PCO2, PO2, HCO3, BE, THGB, TCO2, O2SAT  VBG:    Lab Results   Component Value Date    PHVEN 7.360 08/29/2021    EUF7HLO 34.3 02/23/2021    BEVEN -4.6 02/23/2021    I3FLRHPW 100 02/23/2021       LDH:  No results found for: LDH  Uric Acid:  No results found for: LABURIC, URICACID    PT/INR:    Lab Results   Component Value Date    PROTIME 11.2 08/27/2021    INR 0.99 08/27/2021     Warfarin PT/INR:  No components found for: Delvin Dillard  PTT:    Lab Results   Component Value Date    APTT 65.5 08/29/2021   [APTT}  Last 3 Troponin:    Lab Results   Component Value Date    TROPONINI 0.23 08/28/2021    TROPONINI 0.22 08/27/2021    TROPONINI 0.24 08/27/2021       U/A:    Lab Results   Component Value Date    COLORU YELLOW 08/27/2021    PROTEINU >300 08/27/2021    PHUR 6.0 08/28/2021    WBCUA 7 08/27/2021    RBCUA 3 08/27/2021    CLARITYU Clear 08/27/2021    SPECGRAV 1.013 08/27/2021    LEUKOCYTESUR Negative 08/27/2021    UROBILINOGEN 0.2 08/27/2021    BILIRUBINUR Negative 08/27/2021    BLOODU SMALL 08/27/2021    GLUCOSEU 250 08/27/2021     Microalbumen/Creatinine ratio:  No components found for: RUCREAT  24 Hour Urine for Protein:  No components found for: RAWUPRO, UHRS3, CSSD65OJ, UTV3  24 Hour Urine for Creatinine Clearance:  No components found for: CREAT4, UHRS10, UTV10  Urine Toxicology:  No components found for: Frosty Mon, IBENZO, ICOCAINE, IMARTHC, IOPIATES, IPHENCYC    HgBA1c:    Lab Results   Component Value Date    LABA1C 5.7 08/27/2021     RPR:  No results found for: RPR  HIV:  No results found for: HIV  NILSA:    Lab Results   Component Value Date    NILSA Negative 04/25/2020     RF:  No results found for: RF  DSDNA:  No components found for: DNA  AMYLASE:  No results found for: AMYLASE  LIPASE:    Lab Results   Component Value Date    LIPASE 14.0 04/25/2020     Fibrinogen Level:  No components found for: FIB       BELOW MENTIONED RADIOLOGY STUDY RESULTS BY ME (AS NEEDED FOR MY EVALUATION AND MANAGEMENT). CT HEAD WO CONTRAST    Result Date: 8/27/2021  EXAMINATION: CT OF THE HEAD WITHOUT CONTRAST; CT OF THE CHEST WITHOUT CONTRAST  8/27/2021 7:12 pm TECHNIQUE: CT of the head was performed without the administration of intravenous contrast. Dose modulation, iterative reconstruction, and/or weight based adjustment of the mA/kV was utilized to reduce the radiation dose to as low as reasonably achievable.; CT of the chest was performed without the administration of intravenous contrast. Multiplanar reformatted images are provided for review. Dose modulation, iterative reconstruction, and/or weight based adjustment of the mA/kV was utilized to reduce the radiation dose to as low as reasonably achievable. COMPARISON: CT head 08/27/2020. HISTORY: ORDERING SYSTEM PROVIDED HISTORY: AMS TECHNOLOGIST PROVIDED HISTORY: Has a \"code stroke\" or \"stroke alert\" been called? ->No Reason for exam:->AMS Decision Support Exception - unselect if not a suspected or confirmed emergency medical condition->Emergency Medical Condition (MA) Is the patient pregnant?->No Reason for Exam: Respiratory Distress (Pt brought in per [facility] after call from  for resp distress. O2 sat 70's, BMV in route. Pt will open eyes to voice. Pupils 8mm and fixed. ) Acuity: Acute Type of Exam: Initial; ORDERING SYSTEM PROVIDED HISTORY: AMS, respiratory distress TECHNOLOGIST PROVIDED HISTORY: Reason for exam:->AMS, respiratory distress Is the patient pregnant?->No Reason for Exam: Respiratory Distress (Pt brought in per [facility] after call from  for resp distress. O2 sat 70's, BMV in route. Pt will open eyes to voice. Pupils 8mm and fixed. ) Acuity: Acute Type of Exam: Initial CT HEAD FINDINGS: BRAIN/VENTRICLES: No acute hemorrhage. Periventricular and subcortical hypoattenuation is nonspecific. Interval chronic lacunar infarcts in the left corona radiata, thalamus, and internal capsule.  Artifact partially obscures the CT OF THE CHEST WITHOUT CONTRAST  8/27/2021 7:12 pm TECHNIQUE: CT of the head was performed without the administration of intravenous contrast. Dose modulation, iterative reconstruction, and/or weight based adjustment of the mA/kV was utilized to reduce the radiation dose to as low as reasonably achievable.; CT of the chest was performed without the administration of intravenous contrast. Multiplanar reformatted images are provided for review. Dose modulation, iterative reconstruction, and/or weight based adjustment of the mA/kV was utilized to reduce the radiation dose to as low as reasonably achievable. COMPARISON: CT head 08/27/2020. HISTORY: ORDERING SYSTEM PROVIDED HISTORY: AMS TECHNOLOGIST PROVIDED HISTORY: Has a \"code stroke\" or \"stroke alert\" been called? ->No Reason for exam:->AMS Decision Support Exception - unselect if not a suspected or confirmed emergency medical condition->Emergency Medical Condition (MA) Is the patient pregnant?->No Reason for Exam: Respiratory Distress (Pt brought in per [facility] after call from  for resp distress. O2 sat 70's, BMV in route. Pt will open eyes to voice. Pupils 8mm and fixed. ) Acuity: Acute Type of Exam: Initial; ORDERING SYSTEM PROVIDED HISTORY: AMS, respiratory distress TECHNOLOGIST PROVIDED HISTORY: Reason for exam:->AMS, respiratory distress Is the patient pregnant?->No Reason for Exam: Respiratory Distress (Pt brought in per [facility] after call from  for resp distress. O2 sat 70's, BMV in route. Pt will open eyes to voice. Pupils 8mm and fixed. ) Acuity: Acute Type of Exam: Initial CT HEAD FINDINGS: BRAIN/VENTRICLES: No acute hemorrhage. Periventricular and subcortical hypoattenuation is nonspecific. Interval chronic lacunar infarcts in the left corona radiata, thalamus, and internal capsule. Artifact partially obscures the laureano. Artifact partially obscures the inferior cerebellum.  Joseph Pipe white differentiation appears maintained given artifact near the skull base and through the posterior fossa. Mild prominence of the ventricles noted. Overall ventricle size appears increased from prior exam.  There is no midline shift. Basal cisterns appear patent. ORBITS: Visualized orbits appear unremarkable on this unenhanced exam. SINUSES: Mild mucosal thickening of the ethmoid and sphenoid sinuses. Visualized mastoid air cells appear clear. SOFT TISSUES/SKULL: No depressed calvarial fracture. Fluid in the nasopharynx and oropharynx. CT HEAD IMPRESSION: No acute hemorrhage or midline shift. Increased ventricle size compared to prior exam.  Correlate with presentation to exclude acute hydrocephalus. Other findings as described. CT CHEST FINDINGS: Mediastinum: Heart is borderline enlarged. Trace pericardial effusion or thickening. Aorta is within normal limits in size. Pulmonary arteries are within normal limits in size. . Lungs/pleura: Central airways are patent. Endotracheal tube with tip terminating in the distal 3rd subglottic trachea. Secretions noted in the trachea. Opacification of segmental and subsegmental bronchi. Ground-glass the confluent airspace disease. Interlobular septal thickening. Small to moderate pleural effusions. No pneumothorax. Soft Tissues/Bones: Suboptimal evaluation for adenopathy without intravenous contrast. Mediastinal adenopathy. Diffuse body wall edema. Scattered degenerative changes noted in the visualized spine without spondylolisthesis. Upper Abdomen: Limited images of the upper abdomen are unremarkable given lack of intravenous contrast. CT CHEST IMPRESSION: 1.   1. Multifocal airspace disease that likely represents a combination of aspiration/pneumonia and pulmonary edema. 2. Other findings as described. XR CHEST PORTABLE    Result Date: 8/28/2021  EXAMINATION: ONE XRAY VIEW OF THE CHEST 8/28/2021 1:21 am COMPARISON: Chest x-ray dated 02/24/2021. Michael Ellis  HISTORY: ORDERING SYSTEM PROVIDED HISTORY: central line and OG placement TECHNOLOGIST PROVIDED HISTORY: Reason for exam:->central line and OG placement FINDINGS: LINES/TUBES/OTHER: Endotracheal tube is noted with its tip approximately 5 cm from the ana. Enteric tube is noted coursing below the level of the diaphragm and within the stomach. Left IJ central venous catheter is noted with its tip projecting over the area of the left brachiocephalic vein. HEART/MEDIASTINUM: The cardiac silhouette is mildly enlarged, but stable. PLEURA/LUNGS: There is perihilar fullness and increased interstitial markings which may reflect pulmonary vascular congestion in the correct clinical setting. There is left basilar reflecting airspace disease, atelectasis or pleural effusion. There is right basilar airspace disease. There is no appreciable pneumothorax. BONES/SOFT TISSUE: No acute abnormality. Endotracheal tube and enteric tube are in satisfactory position. The left IJ central venous catheter tip projects over the area of the left brachiocephalic vein. Right basilar airspace disease. Left basilar opacification reflecting airspace disease, atelectasis or pleural effusion.                 Conclusions      Summary   *Left ventricle - moderate concentric LVH, normal size and function with EF   of 55%   *Mitral valve - mild regurgitation   *Tricuspid valve - trivial regurgitation   *Bubble study - negative      Signature      ------------------------------------------------------------------   Electronically signed by Abilio Zheng MD (Interpreting   physician) on 08/31/2020 at 02:48 PM   ------------------------------------------------------------------      Findings

## 2021-08-29 NOTE — PROGRESS NOTES
Hospitalist Progress Note      PCP: Karon Gutierres    Date of Admission: 8/27/2021    Chief Complaint: Respiratory distress    Hospital Course: 55 y.o. female who presented to [facility] with past medical history of hypertension, type 2 diabetes, CKD stage IV, HFpEF, hyperlipidemia presented to the ED with chief complaint of respiratory distress.     History cannot be obtained due to patient being intubated sedated. On chart review patient had 3 or 4 arrival sitting in bed and also having some new onset dyspnea that was severe sudden onset and then started progressively turning blue in the lips for her  and EMS was called noted to have saturation 60% on muscles brought here emergently.   Patient in the ED noted was unable to protect her airway thus was emergently intubated for lifesaving measures.       Subjective: Not able to obtain as the patient is intubated and sedated        Medications:  Reviewed    Infusion Medications    dextrose      propofol 50 mcg/kg/min (08/29/21 1225)    heparin (PORCINE) Infusion 13 Units/kg/hr (08/29/21 1505)    sodium chloride 10 mL/hr at 08/29/21 1000     Scheduled Medications    furosemide  60 mg IntraVENous TID    [START ON 8/30/2021] cefepime  1,000 mg IntraVENous Q24H    insulin lispro  0-6 Units Subcutaneous Q4H    pantoprazole  40 mg IntraVENous BID    sodium chloride flush  5-40 mL IntraVENous 2 times per day    linezolid  600 mg IntraVENous Q12H     PRN Meds: glucose, dextrose, glucagon (rDNA), dextrose, potassium chloride, fentanNYL, heparin (porcine), heparin (porcine), sodium chloride flush, sodium chloride, ondansetron **OR** ondansetron, polyethylene glycol, acetaminophen **OR** acetaminophen, hydrALAZINE      Intake/Output Summary (Last 24 hours) at 8/29/2021 1525  Last data filed at 8/29/2021 1517  Gross per 24 hour   Intake 2972.33 ml   Output 2055 ml   Net 917.33 ml       Physical Exam Performed:    BP (!) 164/71   Pulse 73   Temp 97.3 °F (36.3 °C) (Bladder)   Resp 16   Ht 5' 6\" (1.676 m)   Wt 230 lb 9.6 oz (104.6 kg)   SpO2 99%   BMI 37.22 kg/m²     General appearance: Appears comfortable on the vent looks approximately her stated age. HEENT: Pupils equal, round, and reactive to light. Conjunctivae/corneas clear. Neck: Supple, with full range of motion. No jugular venous distention. Trachea midline. Respiratory: She is vented and has some rales present at the bases bilaterally on her exam.  Eezes/Rhonchi. Cardiovascular: Regular rate and rhythm with normal S1/S2 without murmurs, rubs or gallops. Abdomen: Soft, non-tender, non-distended with normal bowel sounds. Musculoskeletal: No clubbing, cyanosis or edema bilaterally. Full range of motion without deformity. Skin: Skin color, texture, turgor normal.  No rashes or lesions. Neurologic: She was grossly intact II-XII intact, grossly non-focal.  Psychiatric: Not able to assess as she is intubated and sedated  Capillary Refill: Brisk,3 seconds, normal   Peripheral Pulses: +2 palpable, equal bilaterally       Labs:   Recent Labs     08/27/21 1858 08/27/21 1858 08/28/21  0420 08/28/21  0420 08/28/21  1303 08/29/21  0415 08/29/21  1430   WBC 21.0*  --  14.7*  --   --  15.8*  --    HGB 9.6*   < > 8.4*   < > 8.0* 7.3* 7.3*   HCT 30.2*   < > 25.5*   < > 24.3* 22.1* 22.6*     --  210  --   --  251  --     < > = values in this interval not displayed.      Recent Labs     08/27/21 1858 08/28/21  0420 08/29/21  0415    138 138   K 3.9 3.3* 3.5    108 109   CO2 17* 16* 16*   BUN 37* 40* 43*   CREATININE 4.6* 4.8* 5.3*   CALCIUM 8.4 7.6* 6.4*   PHOS  --  5.1* 5.0*     Recent Labs     08/27/21 1858 08/28/21  0420 08/29/21  0415   AST 37 21 11*   ALT 20 14 9*   BILITOT <0.2 <0.2 <0.2   ALKPHOS 167* 107 108     Recent Labs     08/27/21 1858   INR 0.99     Recent Labs     08/27/21 1858 08/27/21  2342 08/28/21  0420 08/29/21  0415   CKTOTAL  --   --  172 66   TROPONINI 0.24* 0.22* 0.23*  --        Urinalysis:      Lab Results   Component Value Date    NITRU Negative 08/27/2021    WBCUA 7 08/27/2021    RBCUA 3 08/27/2021    BLOODU SMALL 08/27/2021    SPECGRAV 1.013 08/27/2021    GLUCOSEU 250 08/27/2021       Radiology:  XR CHEST PORTABLE   Final Result   Enlargement of the cardiac silhouette with central vascular congestion as   well as bibasilar infiltrates and pleural effusions, which may represent a   combination of pulmonary edema as well as potential pneumonia. XR CHEST PORTABLE   Final Result   Endotracheal tube and enteric tube are in satisfactory position. The left IJ central venous catheter tip projects over the area of the left   brachiocephalic vein. Right basilar airspace disease. Left basilar opacification reflecting airspace disease, atelectasis or   pleural effusion. CT CHEST WO CONTRAST   Final Result      CT HEAD WO CONTRAST   Final Result      NM LUNG VENT/PERFUSION (VQ)    (Results Pending)   VL Renal Arterial Duplex Complete    (Results Pending)           Assessment/Plan:    Active Hospital Problems    Diagnosis     Acute respiratory failure (HCC) [J96.00]     Acute on chronic diastolic heart failure (HCC) [I50.33]     Chronic kidney disease (CKD), stage III (moderate) (HCC) [N18.30]     Chronic diastolic (congestive) heart failure (HCC) [I50.32]     Pulmonary edema [J81.1]        Acute hypoxic respiratory failure: Suspected pulmonary embolism  Responsive to mechanical ventilation  Continue to monitor  COVID-19 NEGATIVE     Suspected pulmonary embolism:  Formal echocardiogram to check for heart strain  Unable to perform CTA due to acute kidney function  VQ scan ordered  Empirically on heparin GTT  This was placed on hold as she started having dark red OG output   I changed IV protonix to BID. OG output is no longer blood tinged. Can likely resume heparin gtt.     Acute on chronic HFpEF exacerbation:  Lasix drip initiated     Pneumonia:  Suspected on CT imaging with leukocytosis  Patient will be empirically treated with Zyvox and cefepime. Blood culture grew staph epidermidis in 1 culture. Probable contamination     Hypertensive urgency:  Currently on Lasix drip continue to monitor     Acute on chronic renal failure:  Being followed by nephrology.     Normocytic anemia:  Iron panel ordered, Hemoccult     Proteinuria: Protein to creatinine ratio pending to check for nephrotic syndrome     Chronic medical conditions:  Type 2 Diabetes: Insulin sliding scale  Hyperlipidemia: Continue home medication  Class II obesity:Complicating assessment and treatment. Placing patient at risk for multiple co-morbidities as well as early death and contributing to the patient's presentation. Education, and counseling       DVT Prophylaxis: heparin   Diet: Diet NPO  Code Status: Full Code    PT/OT Eval Status: when appropriate. Dispo - she remains on the vent   Trying to diurese, covered for pneumonia  Medical decision making remains high.   33 minutes of critical care time spent    Ila Lind MD

## 2021-08-29 NOTE — PLAN OF CARE
Problem: Non-Violent Restraints  Goal: Removal from restraints as soon as assessed to be safe  Outcome: Completed  Goal: No harm/injury to patient while restraints in use  Outcome: Completed  Goal: Patient's dignity will be maintained  Outcome: Completed    Pt adequately sedated with appropriate staffing matrix pt poses minimal harm to self or equipment.  Cont to monitor

## 2021-08-29 NOTE — PROGRESS NOTES
08/29/21 1927   Vent Patient Data   Plateau Pressure 21 GNM06   Static Compliance 37 mL/cmH2O   Dynamic Compliance 27.33 mL/cmH2O

## 2021-08-29 NOTE — PROGRESS NOTES
PRN  pantoprazole (PROTONIX) injection 40 mg, 40 mg, IntraVENous, BID  propofol injection, 5-50 mcg/kg/min, IntraVENous, Titrated  fentaNYL (SUBLIMAZE) injection 50 mcg, 50 mcg, IntraVENous, Q1H PRN  heparin (porcine) injection 8,890 Units, 80 Units/kg, IntraVENous, PRN  heparin (porcine) injection 4,440 Units, 40 Units/kg, IntraVENous, PRN  heparin 25,000 units in dextrose 5% 250 mL (premix) infusion, 5-30 Units/kg/hr, IntraVENous, Continuous  sodium chloride flush 0.9 % injection 5-40 mL, 5-40 mL, IntraVENous, 2 times per day  sodium chloride flush 0.9 % injection 5-40 mL, 5-40 mL, IntraVENous, PRN  0.9 % sodium chloride infusion, 25 mL, IntraVENous, PRN  ondansetron (ZOFRAN-ODT) disintegrating tablet 4 mg, 4 mg, Oral, Q8H PRN **OR** ondansetron (ZOFRAN) injection 4 mg, 4 mg, IntraVENous, Q6H PRN  polyethylene glycol (GLYCOLAX) packet 17 g, 17 g, Oral, Daily PRN  acetaminophen (TYLENOL) tablet 650 mg, 650 mg, Oral, Q6H PRN **OR** acetaminophen (TYLENOL) suppository 650 mg, 650 mg, Rectal, Q6H PRN  linezolid (ZYVOX) IVPB 600 mg, 600 mg, IntraVENous, Q12H  hydrALAZINE (APRESOLINE) injection 10 mg, 10 mg, IntraVENous, Q4H PRN    Allergies   Allergen Reactions    Amoxicillin Itching and Hives     Tolerates cephalosporins  Patient tolerating cefazolin (ANCEF) as of October 11, 2018  Patient tolerating cefazolin (ANCEF) as of October 11, 2018  Tolerates cephalosporins  Patient tolerating cefazolin (ANCEF) as of October 11, 2018    Levofloxacin Anaphylaxis    Levofloxacin Anaphylaxis    Vancomycin Shortness Of Breath, Hives and Anaphylaxis    Tape [Adhesive Tape] Other (See Comments)     Paper tape turns skin bright red. Plastic tape okay. Social History:    TOBACCO:   reports that she has been smoking cigarettes. She has been smoking about 1.00 pack per day. She has never used smokeless tobacco.  ETOH:   reports no history of alcohol use.   Patient currently lives independently  Environmental/chemical exposure: None known    Family History:       Problem Relation Age of Onset    Diabetes Mother    Aure Case Other Mother 79        Covid    Diabetes Father     High Blood Pressure Father     Colon Cancer Father     Diabetes Sister     Alcohol Abuse Maternal Grandfather     Diabetes Paternal Grandmother     Alcohol Abuse Paternal Grandfather     Diabetes Paternal Aunt     Diabetes Paternal Uncle      REVIEW OF SYSTEMS:    AMIE is unobtainable due to his critical illness. Objective:   PHYSICAL EXAM:      VITALS:  BP (!) 141/62   Pulse 76   Temp 97.8 °F (36.6 °C) (Bladder)   Resp 16   Ht 5' 6\" (1.676 m)   Wt 230 lb 9.6 oz (104.6 kg)   SpO2 97%   BMI 37.22 kg/m²      24HR INTAKE/OUTPUT:      Intake/Output Summary (Last 24 hours) at 8/29/2021 1218  Last data filed at 8/29/2021 0800  Gross per 24 hour   Intake 653.22 ml   Output 2000 ml   Net -1346.78 ml     CONSTITUTIONAL: Sedated on mechanical ventilation  NECK:  Supple, symmetrical, trachea midline, no adenopathy, thyroid symmetric, not enlarged and no tenderness, skin normal  LUNGS:  no increased work of breathing and crackles to auscultation. No accessory muscle use  CARDIOVASCULAR: S1 and S2, no edema and no JVD  ABDOMEN:  normal bowel sounds, non-distended and no masses palpated, and no tenderness to palpation. No hepatospleenomegaly  LYMPHADENOPATHY:  no axillary or supraclavicular adenopathy. No cervical adnenopathy  PSYCHIATRIC: Sedated on mechanical ventilation MUSCULOSKELETAL: No obvious misalignment or effusion of the joints. No clubbing, cyanosis of the digits. SKIN:  normal skin color, texture, turgor and no redness, warmth, or swelling.  No palpable nodules    DATA:    Old records have been reviewed    CBC:  Recent Labs     08/27/21  1858 08/27/21  1858 08/28/21  0420 08/28/21  1303 08/29/21  0415   WBC 21.0*  --  14.7*  --  15.8*   RBC 3.37*  --  2.89*  --  2.49*   HGB 9.6*   < > 8.4* 8.0* 7.3*   HCT 30.2*   < > 25.5* 24.3* 22.1*    --  210  --  251   MCV 89.5  --  88.2  --  88.7   MCH 28.5  --  29.0  --  29.1   MCHC 31.8  --  32.8  --  32.8   RDW 16.3*  --  15.8*  --  15.9*    < > = values in this interval not displayed. BMP:  Recent Labs     08/27/21  1858 08/28/21  0420 08/29/21  0415    138 138   K 3.9 3.3* 3.5    108 109   CO2 17* 16* 16*   BUN 37* 40* 43*   CREATININE 4.6* 4.8* 5.3*   CALCIUM 8.4 7.6* 6.4*   GLUCOSE 161* 114* 114*      ABG:  Recent Labs     08/27/21  1930   PHART 7.233*   WLC8XEB 40.9   PO2ART 243.0*   AOU3DGU 17.2*   C9MGPDKL 100.0   BEART -9.6*     Procalcitonin  Recent Labs     08/28/21  1435   PROCAL 0.86*       No results found for: BNP  Lab Results   Component Value Date    CKTOTAL 66 08/29/2021    TROPONINI 0.23 (H) 08/28/2021           Radiology Review:  All pertinent images / reports were reviewed as a part of this visit. Assessment:     1. Acute hypoxemic respiratory failure  2. Acute on chronic kidney disease  3. Acute pulmonary edema      Plan:     I have reviewed laboratories, medical records and images for this visit  Chest imaging is consistent with acute pulmonary edema  Chest imaging from today is pending  BNP is greater than 30,000 on admission  Creatinine has worsened and is now 5.3  She is receiving boluses of Lasix  Response has been marginal  May end up needing CRRT    She does have a leukocytosis and pro calcitonin is 0.86  She is growing staph epidermidis from her blood. This is likely contaminant  Otherwise cultures are negative. MRSA nasal screen is pending  Remains on Zyvox and cefepime  We will continue this for another 24 hours. Hopefully we can start to escalate her antibiotics after that    Remains on AC/, respiratory rate 14 and FiO2 0.3 with PEEP 5  Chest x-ray for today is pending  While she is on minimal support, I am reluctant to liberate from mechanical ventilation with her suboptimal diuresis and rising creatinine.   .  Addendum: CXR still shows pulmonary edema. Maintain mechanical ventilation    Total critical care time caring for this patient with life threatening illness, including direct patient contact, management of life support systems, review of data including imaging and labs, discussions with other team members and physicians is at least 32 minutes so far today, excluding procedures.

## 2021-08-29 NOTE — PROGRESS NOTES
[facility] Daily Progress Note      Admit Date:  8/27/2021    Chief Complaint   Patient presents with    Respiratory Distress     Pt brought in per [facility] after call from  for resp distress. O2 sat 70's, BMV in route. Pt will open eyes to voice. Pupils 8mm and fixed. Subjective:  Ms. Donavon Sanchez continues to be intubated and sedated.  On minimal vent settings    Objective:   BP (!) 141/62   Pulse 76   Temp 97.8 °F (36.6 °C) (Bladder)   Resp 16   Ht 5' 6\" (1.676 m)   Wt 230 lb 9.6 oz (104.6 kg)   SpO2 97%   BMI 37.22 kg/m²       Intake/Output Summary (Last 24 hours) at 8/29/2021 1245  Last data filed at 8/29/2021 0800  Gross per 24 hour   Intake 653.22 ml   Output 1760 ml   Net -1106.78 ml       TELEMETRY: Sinus     Physical Exam:  General:  Intubated, sedated, NAD  Skin:  Warm and dry  Neck:  JVD +  Chest:  crackles  Cardiovascular:  RRR S1S2, no S3, no mrmr  Abdomen:  Soft, ND, NT, No HSM  Extremities:  1+ edema    Medications:    furosemide  60 mg IntraVENous TID    [START ON 8/30/2021] cefepime  1,000 mg IntraVENous Q24H    insulin lispro  0-6 Units Subcutaneous Q4H    pantoprazole  40 mg IntraVENous BID    sodium chloride flush  5-40 mL IntraVENous 2 times per day    linezolid  600 mg IntraVENous Q12H      dextrose      propofol 50 mcg/kg/min (08/29/21 1225)    heparin (PORCINE) Infusion 13 Units/kg/hr (08/29/21 0057)    sodium chloride 25 mL (08/28/21 0307)     glucose, dextrose, glucagon (rDNA), dextrose, potassium chloride, fentanNYL, heparin (porcine), heparin (porcine), sodium chloride flush, sodium chloride, ondansetron **OR** ondansetron, polyethylene glycol, acetaminophen **OR** acetaminophen, hydrALAZINE    Lab Data:  CBC:   Recent Labs     08/27/21  1858 08/27/21  1858 08/28/21  0420 08/28/21  1303 08/29/21  0415   WBC 21.0*  --  14.7*  --  15.8*   HGB 9.6*   < > 8.4* 8.0* 7.3*   HCT 30.2*   < > 25.5* 24.3* 22.1*   MCV 89.5  --  88.2  --  88.7   PLT 353  --  210  --  251    < > = values in this interval not displayed. BMP:   Recent Labs     08/27/21  1858 08/28/21  0420 08/29/21  0415    138 138   K 3.9 3.3* 3.5    108 109   CO2 17* 16* 16*   PHOS  --  5.1* 5.0*   BUN 37* 40* 43*   CREATININE 4.6* 4.8* 5.3*     LIVER PROFILE:   Recent Labs     08/27/21  1858 08/28/21  0420 08/29/21  0415   AST 37 21 11*   ALT 20 14 9*   BILITOT <0.2 <0.2 <0.2   ALKPHOS 167* 107 108     PT/INR:   Recent Labs     08/27/21 1858   PROTIME 11.2   INR 0.99     APTT:   Recent Labs     08/28/21  1435 08/28/21  2300 08/29/21 0415   APTT 34.9 110.6* 65.5*     BNP:  No results for input(s): BNP in the last 72 hours. IMAGING: none    Assessment/Plan:  Active Problems:      Acute respiratory failure (HCC)  Plan: sudden hypoxic resp failure. No COVID. H/o CKD with KINZA. Severely elevated BNP but LV function essentially normal. Recommend stress myoview when medically stable. Cont lasix dosed by nephrology. Minimal diuresis with lasix doses and rising creatinine. Consider CRRT if it doesn't improve. Cont BP control. Could be diastolic CHF as a result of renal failure. Check for renal artery stenosis with renal artery doppelrs. Likely mix aspiration PNA pulm edema from renal failure.     Critical Care   Due to the high probability of clinically significant life threating deterioration of the patient's condition that required my urgent intervention, a total critical care time of >35 minutes was used. This time excludes any time that may have been spent performing procedures. This includes but not limited to vital sign monitoring, telemetry monitoring, continuous pulse oximety, IV medication, clinical response to the IV medications, documentation time , consultation time, interpretation of lab data, review of nursing notes and old record review.        Sherly Robles MD, MD 8/29/2021 12:45 PM

## 2021-08-30 LAB
A/G RATIO: 0.5 (ref 1.1–2.2)
ALBUMIN SERPL-MCNC: 1.8 G/DL (ref 3.4–5)
ALP BLD-CCNC: 106 U/L (ref 40–129)
ALT SERPL-CCNC: 8 U/L (ref 10–40)
ANION GAP SERPL CALCULATED.3IONS-SCNC: 16 MMOL/L (ref 3–16)
APTT: 110.8 SEC (ref 26.2–38.6)
APTT: 66.3 SEC (ref 26.2–38.6)
APTT: 69.2 SEC (ref 26.2–38.6)
AST SERPL-CCNC: 8 U/L (ref 15–37)
BASE EXCESS ARTERIAL: -9.4 MMOL/L (ref -3–3)
BASOPHILS ABSOLUTE: 0.1 K/UL (ref 0–0.2)
BASOPHILS RELATIVE PERCENT: 0.8 %
BILIRUB SERPL-MCNC: <0.2 MG/DL (ref 0–1)
BUN BLDV-MCNC: 46 MG/DL (ref 7–20)
CALCIUM IONIZED: 1.02 MMOL/L (ref 1.12–1.32)
CALCIUM SERPL-MCNC: 7.5 MG/DL (ref 8.3–10.6)
CARBOXYHEMOGLOBIN ARTERIAL: 0.8 % (ref 0–1.5)
CHLORIDE BLD-SCNC: 106 MMOL/L (ref 99–110)
CO2: 14 MMOL/L (ref 21–32)
CREAT SERPL-MCNC: 5.5 MG/DL (ref 0.6–1.1)
CULTURE, RESPIRATORY: ABNORMAL
CULTURE, RESPIRATORY: ABNORMAL
EOSINOPHILS ABSOLUTE: 0.9 K/UL (ref 0–0.6)
EOSINOPHILS RELATIVE PERCENT: 5.2 %
GFR AFRICAN AMERICAN: 10
GFR NON-AFRICAN AMERICAN: 8
GLOBULIN: 3.5 G/DL
GLUCOSE BLD-MCNC: 110 MG/DL (ref 70–99)
GLUCOSE BLD-MCNC: 113 MG/DL (ref 70–99)
GLUCOSE BLD-MCNC: 114 MG/DL (ref 70–99)
GLUCOSE BLD-MCNC: 128 MG/DL (ref 70–99)
GLUCOSE BLD-MCNC: 99 MG/DL (ref 70–99)
GRAM STAIN RESULT: ABNORMAL
HCO3 ARTERIAL: 16 MMOL/L (ref 21–29)
HCT VFR BLD CALC: 23.4 % (ref 36–48)
HEMOGLOBIN, ART, EXTENDED: 7.8 G/DL (ref 12–16)
HEMOGLOBIN: 7.6 G/DL (ref 12–16)
LACTIC ACID: 0.7 MMOL/L (ref 0.4–2)
LV EF: 58 %
LVEF MODALITY: NORMAL
LYMPHOCYTES ABSOLUTE: 2.5 K/UL (ref 1–5.1)
LYMPHOCYTES RELATIVE PERCENT: 15.1 %
MAGNESIUM: 1.9 MG/DL (ref 1.8–2.4)
MCH RBC QN AUTO: 28.9 PG (ref 26–34)
MCHC RBC AUTO-ENTMCNC: 32.4 G/DL (ref 31–36)
MCV RBC AUTO: 89.1 FL (ref 80–100)
METHEMOGLOBIN ARTERIAL: 0.6 %
MONOCYTES ABSOLUTE: 1.2 K/UL (ref 0–1.3)
MONOCYTES RELATIVE PERCENT: 7.4 %
NEUTROPHILS ABSOLUTE: 11.8 K/UL (ref 1.7–7.7)
NEUTROPHILS RELATIVE PERCENT: 71.5 %
O2 SAT, ARTERIAL: 97.7 %
O2 THERAPY: ABNORMAL
OCCULT BLOOD DIAGNOSTIC: ABNORMAL
ORGANISM: ABNORMAL
PCO2 ARTERIAL: 31.9 MMHG (ref 35–45)
PDW BLD-RTO: 16.3 % (ref 12.4–15.4)
PERFORMED ON: ABNORMAL
PERFORMED ON: NORMAL
PH ARTERIAL: 7.31 (ref 7.35–7.45)
PH VENOUS: 7.34 (ref 7.35–7.45)
PHOSPHORUS: 5.5 MG/DL (ref 2.5–4.9)
PLATELET # BLD: 251 K/UL (ref 135–450)
PMV BLD AUTO: 8.8 FL (ref 5–10.5)
PO2 ARTERIAL: 92.7 MMHG (ref 75–108)
POTASSIUM SERPL-SCNC: 3.2 MMOL/L (ref 3.5–5.1)
POTASSIUM SERPL-SCNC: 3.4 MMOL/L (ref 3.5–5.1)
PRO-BNP: ABNORMAL PG/ML (ref 0–124)
RBC # BLD: 2.62 M/UL (ref 4–5.2)
SODIUM BLD-SCNC: 136 MMOL/L (ref 136–145)
TCO2 ARTERIAL: 38.1 MMOL/L
TOTAL CK: 39 U/L (ref 26–192)
TOTAL PROTEIN: 5.3 G/DL (ref 6.4–8.2)
WBC # BLD: 16.6 K/UL (ref 4–11)

## 2021-08-30 PROCEDURE — 84100 ASSAY OF PHOSPHORUS: CPT

## 2021-08-30 PROCEDURE — 93306 TTE W/DOPPLER COMPLETE: CPT

## 2021-08-30 PROCEDURE — 83880 ASSAY OF NATRIURETIC PEPTIDE: CPT

## 2021-08-30 PROCEDURE — 82803 BLOOD GASES ANY COMBINATION: CPT

## 2021-08-30 PROCEDURE — 82550 ASSAY OF CK (CPK): CPT

## 2021-08-30 PROCEDURE — 2580000003 HC RX 258: Performed by: INTERNAL MEDICINE

## 2021-08-30 PROCEDURE — 94003 VENT MGMT INPAT SUBQ DAY: CPT

## 2021-08-30 PROCEDURE — 6360000002 HC RX W HCPCS: Performed by: EMERGENCY MEDICINE

## 2021-08-30 PROCEDURE — 2000000000 HC ICU R&B

## 2021-08-30 PROCEDURE — 2700000000 HC OXYGEN THERAPY PER DAY

## 2021-08-30 PROCEDURE — C9113 INJ PANTOPRAZOLE SODIUM, VIA: HCPCS | Performed by: INTERNAL MEDICINE

## 2021-08-30 PROCEDURE — 6360000002 HC RX W HCPCS: Performed by: INTERNAL MEDICINE

## 2021-08-30 PROCEDURE — 83735 ASSAY OF MAGNESIUM: CPT

## 2021-08-30 PROCEDURE — 83605 ASSAY OF LACTIC ACID: CPT

## 2021-08-30 PROCEDURE — 85730 THROMBOPLASTIN TIME PARTIAL: CPT

## 2021-08-30 PROCEDURE — 94761 N-INVAS EAR/PLS OXIMETRY MLT: CPT

## 2021-08-30 PROCEDURE — 84132 ASSAY OF SERUM POTASSIUM: CPT

## 2021-08-30 PROCEDURE — 36592 COLLECT BLOOD FROM PICC: CPT

## 2021-08-30 PROCEDURE — 99291 CRITICAL CARE FIRST HOUR: CPT | Performed by: INTERNAL MEDICINE

## 2021-08-30 PROCEDURE — 85025 COMPLETE CBC W/AUTO DIFF WBC: CPT

## 2021-08-30 PROCEDURE — 80053 COMPREHEN METABOLIC PANEL: CPT

## 2021-08-30 PROCEDURE — 82330 ASSAY OF CALCIUM: CPT

## 2021-08-30 PROCEDURE — 82270 OCCULT BLOOD FECES: CPT

## 2021-08-30 RX ORDER — FUROSEMIDE 10 MG/ML
40 INJECTION INTRAMUSCULAR; INTRAVENOUS 3 TIMES DAILY
Status: DISCONTINUED | OUTPATIENT
Start: 2021-08-30 | End: 2021-09-07

## 2021-08-30 RX ORDER — POTASSIUM CHLORIDE 29.8 MG/ML
20 INJECTION INTRAVENOUS
Status: DISPENSED | OUTPATIENT
Start: 2021-08-30 | End: 2021-08-30

## 2021-08-30 RX ORDER — FUROSEMIDE 10 MG/ML
40 INJECTION INTRAMUSCULAR; INTRAVENOUS ONCE
Status: COMPLETED | OUTPATIENT
Start: 2021-08-30 | End: 2021-08-30

## 2021-08-30 RX ADMIN — POTASSIUM CHLORIDE 20 MEQ: 29.8 INJECTION, SOLUTION INTRAVENOUS at 16:24

## 2021-08-30 RX ADMIN — Medication 10 ML: at 13:43

## 2021-08-30 RX ADMIN — PROPOFOL 50 MCG/KG/MIN: 10 INJECTION, EMULSION INTRAVENOUS at 23:10

## 2021-08-30 RX ADMIN — PROPOFOL 50 MCG/KG/MIN: 10 INJECTION, EMULSION INTRAVENOUS at 06:54

## 2021-08-30 RX ADMIN — Medication 10 ML: at 09:35

## 2021-08-30 RX ADMIN — POTASSIUM CHLORIDE 20 MEQ: 29.8 INJECTION, SOLUTION INTRAVENOUS at 05:26

## 2021-08-30 RX ADMIN — PANTOPRAZOLE SODIUM 40 MG: 40 INJECTION, POWDER, FOR SOLUTION INTRAVENOUS at 09:35

## 2021-08-30 RX ADMIN — Medication 10 ML: at 09:39

## 2021-08-30 RX ADMIN — Medication 10 ML: at 10:59

## 2021-08-30 RX ADMIN — PROPOFOL 50 MCG/KG/MIN: 10 INJECTION, EMULSION INTRAVENOUS at 20:21

## 2021-08-30 RX ADMIN — CEFEPIME HYDROCHLORIDE 1000 MG: 1 INJECTION, POWDER, FOR SOLUTION INTRAMUSCULAR; INTRAVENOUS at 09:38

## 2021-08-30 RX ADMIN — PROPOFOL 50 MCG/KG/MIN: 10 INJECTION, EMULSION INTRAVENOUS at 17:34

## 2021-08-30 RX ADMIN — FUROSEMIDE 40 MG: 10 INJECTION, SOLUTION INTRAMUSCULAR; INTRAVENOUS at 10:59

## 2021-08-30 RX ADMIN — FUROSEMIDE 40 MG: 10 INJECTION, SOLUTION INTRAMUSCULAR; INTRAVENOUS at 13:42

## 2021-08-30 RX ADMIN — FUROSEMIDE 40 MG: 10 INJECTION, SOLUTION INTRAMUSCULAR; INTRAVENOUS at 20:22

## 2021-08-30 RX ADMIN — PROPOFOL 50 MCG/KG/MIN: 10 INJECTION, EMULSION INTRAVENOUS at 09:42

## 2021-08-30 RX ADMIN — PROPOFOL 50 MCG/KG/MIN: 10 INJECTION, EMULSION INTRAVENOUS at 04:14

## 2021-08-30 RX ADMIN — PROPOFOL 50 MCG/KG/MIN: 10 INJECTION, EMULSION INTRAVENOUS at 13:43

## 2021-08-30 RX ADMIN — HEPARIN SODIUM 10 UNITS/KG/HR: 10000 INJECTION, SOLUTION INTRAVENOUS at 09:45

## 2021-08-30 RX ADMIN — PANTOPRAZOLE SODIUM 40 MG: 40 INJECTION, POWDER, FOR SOLUTION INTRAVENOUS at 20:22

## 2021-08-30 RX ADMIN — PROPOFOL 50 MCG/KG/MIN: 10 INJECTION, EMULSION INTRAVENOUS at 00:36

## 2021-08-30 ASSESSMENT — PAIN SCALES - GENERAL: PAINLEVEL_OUTOF10: 0

## 2021-08-30 ASSESSMENT — PULMONARY FUNCTION TESTS
PIF_VALUE: 25
PIF_VALUE: 25
PIF_VALUE: 26
PIF_VALUE: 23
PIF_VALUE: 24
PIF_VALUE: 25

## 2021-08-30 NOTE — PROGRESS NOTES
V/Q scan ordered on hold for now. Patient on ventilator currently. If wanted we can do a perfusion only scan while on ventilator but will not get ventilation images.   Discussed with patient's RN    Juanito TOBINMT, RT(MR)

## 2021-08-30 NOTE — PROGRESS NOTES
[facility] Pulmonary and Critical Care  Progress note      Reason for Consult: Acute hypoxemic respiratory failure, pulmonary edema, acute on chronic kidney disease  Requesting Physician: Dr. Joan Gonsalves:   [address] [facility]:                The patient is a 55 y.o. female with significant past medical history of:      Diagnosis Date    Blind in both eyes     Cerebral artery occlusion with cerebral infarction ([address]     CHF (congestive heart failure) ([address]     Chronic kidney disease     Depression     Diabetes mellitus out of control ([address]     Diabetes mellitus, type II ([address]     2005    Diabetic neuropathy associated with type 2 diabetes mellitus ([address]     Generalized headaches     Hypertension     Infertility     Insomnia     chronic vs lack of time spent to sleep    Migraine headache 11/09/2011    Mixed hyperlipidemia     Otitis media     h/o recurrent    Pelvic abscess in female 10/05/2013    Pneumonia     2004 approx.  Stroke [facility] 08/27/2020     Interval history: Patient remains sedated and intubated on mechanical ventilation. Tolerating FiO2 0.3 and PEEP 5. Urine output is adequate.       Past Surgical History:        Procedure Laterality Date    CERVIX SURGERY      laser tx for dysplasia;1992    EYE SURGERY      FOOT SURGERY Right     FOOT SURGERY Bilateral     FOOT SURGERY Left      Current Medications:    Current Facility-Administered Medications: furosemide (LASIX) injection 40 mg, 40 mg, IntraVENous, TID  cefepime (MAXIPIME) 1000 mg IVPB minibag, 1,000 mg, IntraVENous, Q24H  insulin lispro (1 Unit Dial) 0-6 Units, 0-6 Units, SubCUTAneous, Q4H  glucose (GLUTOSE) 40 % oral gel 15 g, 15 g, Oral, PRN  dextrose 50 % IV solution, 12.5 g, IntraVENous, PRN  glucagon (rDNA) injection 1 mg, 1 mg, IntraMUSCular, PRN  dextrose 5 % solution, 100 mL/hr, IntraVENous, PRN  pantoprazole (PROTONIX) injection 40 mg, 40 mg, IntraVENous, BID  propofol injection, 5-50 mcg/kg/min, IntraVENous, Titrated  fentaNYL (SUBLIMAZE) injection 50 mcg, 50 mcg, IntraVENous, Q1H PRN  heparin (porcine) injection 8,890 Units, 80 Units/kg, IntraVENous, PRN  heparin (porcine) injection 4,440 Units, 40 Units/kg, IntraVENous, PRN  heparin 25,000 units in dextrose 5% 250 mL (premix) infusion, 5-30 Units/kg/hr, IntraVENous, Continuous  sodium chloride flush 0.9 % injection 5-40 mL, 5-40 mL, IntraVENous, 2 times per day  sodium chloride flush 0.9 % injection 5-40 mL, 5-40 mL, IntraVENous, PRN  0.9 % sodium chloride infusion, 25 mL, IntraVENous, PRN  ondansetron (ZOFRAN-ODT) disintegrating tablet 4 mg, 4 mg, Oral, Q8H PRN **OR** ondansetron (ZOFRAN) injection 4 mg, 4 mg, IntraVENous, Q6H PRN  polyethylene glycol (GLYCOLAX) packet 17 g, 17 g, Oral, Daily PRN  acetaminophen (TYLENOL) tablet 650 mg, 650 mg, Oral, Q6H PRN **OR** acetaminophen (TYLENOL) suppository 650 mg, 650 mg, Rectal, Q6H PRN  hydrALAZINE (APRESOLINE) injection 10 mg, 10 mg, IntraVENous, Q4H PRN    Allergies   Allergen Reactions    Amoxicillin Itching and Hives     Tolerates cephalosporins  Patient tolerating cefazolin (ANCEF) as of October 11, 2018  Patient tolerating cefazolin (ANCEF) as of October 11, 2018  Tolerates cephalosporins  Patient tolerating cefazolin (ANCEF) as of October 11, 2018    Levofloxacin Anaphylaxis    Levofloxacin Anaphylaxis    Vancomycin Shortness Of Breath, Hives and Anaphylaxis    Tape [Adhesive Tape] Other (See Comments)     Paper tape turns skin bright red. Plastic tape okay. Social History:    TOBACCO:   reports that she has been smoking cigarettes. She has been smoking about 1.00 pack per day. She has never used smokeless tobacco.  ETOH:   reports no history of alcohol use.   Patient currently lives independently  Environmental/chemical exposure: None known    Family History:       Problem Relation Age of Onset    Diabetes Mother    Zannie Cowden Other Mother 79        Covid    Diabetes Father     High Blood Pressure Father     Colon Cancer Father     Diabetes Sister     Alcohol Abuse Maternal Grandfather     Diabetes Paternal Grandmother     Alcohol Abuse Paternal Grandfather     Diabetes Paternal Aunt     Diabetes Paternal Uncle      REVIEW OF SYSTEMS:    ROS is unobtainable due to his critical illness. Objective:   PHYSICAL EXAM:      VITALS:  BP (!) 146/61   Pulse 73   Temp 96.5 °F (35.8 °C) (Bladder)   Resp 16   Ht 5' 6\" (1.676 m)   Wt 228 lb 9.9 oz (103.7 kg)   SpO2 100%   BMI 36.90 kg/m²      24HR INTAKE/OUTPUT:      Intake/Output Summary (Last 24 hours) at 8/30/2021 1659  Last data filed at 8/30/2021 1600  Gross per 24 hour   Intake 1787.13 ml   Output 2980 ml   Net -1192.87 ml     CONSTITUTIONAL: Sedated on mechanical ventilation  NECK:  Supple, symmetrical, trachea midline, no adenopathy, thyroid symmetric, not enlarged and no tenderness, skin normal  LUNGS:  no increased work of breathing and crackles to auscultation. No accessory muscle use  CARDIOVASCULAR: S1 and S2, no edema and no JVD  ABDOMEN:  normal bowel sounds, non-distended and no masses palpated, and no tenderness to palpation. No hepatospleenomegaly  LYMPHADENOPATHY:  no axillary or supraclavicular adenopathy. No cervical adnenopathy  PSYCHIATRIC: Sedated on mechanical ventilation MUSCULOSKELETAL: No obvious misalignment or effusion of the joints. No clubbing, cyanosis of the digits. SKIN:  normal skin color, texture, turgor and no redness, warmth, or swelling.  No palpable nodules    DATA:    Old records have been reviewed    CBC:  Recent Labs     08/28/21  0420 08/28/21  1303 08/29/21  0415 08/29/21  1430 08/30/21  0425   WBC 14.7*  --  15.8*  --  16.6*   RBC 2.89*  --  2.49*  --  2.62*   HGB 8.4*   < > 7.3* 7.3* 7.6*   HCT 25.5*   < > 22.1* 22.6* 23.4*     --  251  --  251   MCV 88.2  --  88.7  --  89.1   MCH 29.0  --  29.1  --  28.9   MCHC 32.8  --  32.8  --  32.4   RDW 15.8*  --  15.9*  --  16.3*    < > = values in this interval not displayed. BMP:  Recent Labs     08/28/21  0420 08/29/21  0415 08/30/21  0425 08/30/21  1530    138 136  --    K 3.3* 3.5 3.2* 3.4*    109 106  --    CO2 16* 16* 14*  --    BUN 40* 43* 46*  --    CREATININE 4.8* 5.3* 5.5*  --    CALCIUM 7.6* 6.4* 7.5*  --    GLUCOSE 114* 114* 114*  --       ABG:  Recent Labs     08/27/21  1930 08/30/21  1055   PHART 7.233* 7.309*   DIA5SOR 40.9 31.9*   PO2ART 243.0* 92.7   HBY4BDO 17.2* 16.0*   Y3NZAARQ 100.0 97.7   BEART -9.6* -9.4*     Procalcitonin  Recent Labs     08/28/21  1435   PROCAL 0.86*       No results found for: BNP  Lab Results   Component Value Date    CKTOTAL 39 08/30/2021    TROPONINI 0.23 (H) 08/28/2021           Radiology Review:  All pertinent images / reports were reviewed as a part of this visit. Assessment:     1. Acute hypoxemic respiratory failure  2. Acute on chronic kidney disease  3. Acute pulmonary edema      Plan:     I have reviewed laboratories, medical records and images for this visit  Chest imaging is consistent with acute pulmonary edema  Chest imaging from today is pending  BNP is greater than 30,000 on admission  Creatinine has worsened and is now 5.5  She is receiving boluses of Lasix  Urine output has been better over the last 24 hours  May end up needing CRRT    She does have a leukocytosis and pro calcitonin is 0.86  She is growing staph epidermidis from her blood. This is likely contaminant  Otherwise cultures are negative. MRSA nasal screen is pending  Remains on Zyvox and cefepime  We will continue this for another 24 hours. Hopefully we can start to escalate her antibiotics after that    Remains on AC/, respiratory rate 14 and FiO2 0.3 with PEEP 5  Chest x-ray for today is pending  Decision today was not to do CRRT or dialysis  We tried spontaneous breathing trial but the patient did not tolerated due to hypoxemia and tachypnea.   Will repeat x-ray in the morning and repeat spontaneous breathing trial.    Patient did have echocardiogram showing good LV function and no significant diastolic dysfunction. Total critical care time caring for this patient with life threatening illness, including direct patient contact, management of life support systems, review of data including imaging and labs, discussions with other team members and physicians is at least 32 minutes so far today, excluding procedures.

## 2021-08-30 NOTE — PROCEDURES
Unable to perform test at this time due to patient being on vent. Lori LEACH advised.   Vascular Lab

## 2021-08-30 NOTE — PROGRESS NOTES
aptt 69.2 and 66. Heparin gtt therapeutic x2 @ 10 u/kg/hr. Will change to daily aptt.      Electronically signed by Le Whitehead RN on 8/30/2021 at 6:41 PM

## 2021-08-30 NOTE — PROGRESS NOTES
Currently sedated on ventilator. Unable to review HF education with patient at this time. Will see patient when more appropriate.

## 2021-08-30 NOTE — PROGRESS NOTES
Patient failed SBT after 1 minute. She became tachypneic with RR 38 bpm and her SpO2 dropped to 86%. FiO2 increased to 50% and sedation was restarted. Placed patient back on previous vent settings.

## 2021-08-30 NOTE — PROGRESS NOTES
Hospitalist Progress Note      PCP: Chelle Burrell    Date of Admission: 8/27/2021    Chief Complaint: Respiratory distress    Hospital Course: 55 y.o. female who presented to [facility] with past medical history of hypertension, type 2 diabetes, CKD stage IV, HFpEF, hyperlipidemia presented to the ED with chief complaint of respiratory distress.     History cannot be obtained due to patient being intubated sedated. On chart review patient had 3 or 4 arrival sitting in bed and also having some new onset dyspnea that was severe sudden onset and then started progressively turning blue in the lips for her  and EMS was called noted to have saturation 60% on muscles brought here emergently.   Patient in the ED noted was unable to protect her airway thus was emergently intubated for lifesaving measures.       Subjective: Not able to obtain as the patient is intubated and sedated        Medications:  Reviewed    Infusion Medications    dextrose      propofol 50 mcg/kg/min (08/30/21 1734)    heparin (PORCINE) Infusion 10 Units/kg/hr (08/30/21 0945)    sodium chloride Stopped (08/29/21 2301)     Scheduled Medications    furosemide  40 mg IntraVENous TID    cefepime  1,000 mg IntraVENous Q24H    insulin lispro  0-6 Units SubCUTAneous Q4H    pantoprazole  40 mg IntraVENous BID    sodium chloride flush  5-40 mL IntraVENous 2 times per day     PRN Meds: glucose, dextrose, glucagon (rDNA), dextrose, fentanNYL, heparin (porcine), heparin (porcine), sodium chloride flush, sodium chloride, ondansetron **OR** ondansetron, polyethylene glycol, acetaminophen **OR** acetaminophen, hydrALAZINE      Intake/Output Summary (Last 24 hours) at 8/30/2021 1824  Last data filed at 8/30/2021 1737  Gross per 24 hour   Intake 1061.34 ml   Output 3180 ml   Net -2118.66 ml       Physical Exam Performed:    BP (!) 146/61   Pulse 73   Temp 96.5 °F (35.8 °C) (Bladder)   Resp 16   Ht 5' 6\" (1.676 m)   Wt 228 lb 9.9 oz (103.7 kg)   SpO2 100%   BMI 36.90 kg/m²     General appearance: Appears comfortable on the vent looks approximately her stated age. HEENT: Pupils equal, round, and reactive to light. Conjunctivae/corneas clear. Neck: Supple, with full range of motion. No jugular venous distention. Trachea midline. Respiratory: She is vented and has some rales present at the bases bilaterally on her exam.  Eezes/Rhonchi. Cardiovascular: Regular rate and rhythm with normal S1/S2 without murmurs, rubs or gallops. Abdomen: Soft, non-tender, non-distended with normal bowel sounds. Musculoskeletal: No clubbing, cyanosis or edema bilaterally. Full range of motion without deformity. Skin: Skin color, texture, turgor normal.  No rashes or lesions. Neurologic: She was grossly intact II-XII intact, grossly non-focal.  Psychiatric: Not able to assess as she is intubated and sedated  Capillary Refill: Brisk,3 seconds, normal   Peripheral Pulses: +2 palpable, equal bilaterally       Labs:   Recent Labs     08/28/21  0420 08/28/21  1303 08/29/21 0415 08/29/21  1430 08/30/21 0425   WBC 14.7*  --  15.8*  --  16.6*   HGB 8.4*   < > 7.3* 7.3* 7.6*   HCT 25.5*   < > 22.1* 22.6* 23.4*     --  251  --  251    < > = values in this interval not displayed.      Recent Labs     08/28/21  0420 08/29/21 0415 08/30/21 0425 08/30/21  1530    138 136  --    K 3.3* 3.5 3.2* 3.4*    109 106  --    CO2 16* 16* 14*  --    BUN 40* 43* 46*  --    CREATININE 4.8* 5.3* 5.5*  --    CALCIUM 7.6* 6.4* 7.5*  --    PHOS 5.1* 5.0* 5.5*  --      Recent Labs     08/28/21  0420 08/29/21 0415 08/30/21 0425   AST 21 11* 8*   ALT 14 9* 8*   BILITOT <0.2 <0.2 <0.2   ALKPHOS 107 108 106     Recent Labs     08/27/21  1858   INR 0.99     Recent Labs     08/27/21  1858 08/27/21  2342 08/28/21  0420 08/29/21  0415 08/30/21  0425   CKTOTAL  --   --  172 66 39   TROPONINI 0.24* 0.22* 0.23*  --   --        Urinalysis:      Lab Results   Component Value Date    NITRU Negative 08/27/2021    WBCUA 7 08/27/2021    RBCUA 3 08/27/2021    BLOODU SMALL 08/27/2021    SPECGRAV 1.013 08/27/2021    GLUCOSEU 250 08/27/2021       Radiology:  XR CHEST PORTABLE   Final Result   Enlargement of the cardiac silhouette with central vascular congestion as   well as bibasilar infiltrates and pleural effusions, which may represent a   combination of pulmonary edema as well as potential pneumonia. XR CHEST PORTABLE   Final Result   Endotracheal tube and enteric tube are in satisfactory position. The left IJ central venous catheter tip projects over the area of the left   brachiocephalic vein. Right basilar airspace disease. Left basilar opacification reflecting airspace disease, atelectasis or   pleural effusion. CT CHEST WO CONTRAST   Final Result      CT HEAD WO CONTRAST   Final Result      NM LUNG VENT/PERFUSION (VQ)    (Results Pending)   VL Renal Arterial Duplex Complete    (Results Pending)           Assessment/Plan:    Active Hospital Problems    Diagnosis     Acute respiratory failure (HCC) [J96.00]     Acute on chronic diastolic heart failure (HCC) [I50.33]     Chronic kidney disease (CKD), stage III (moderate) (HCC) [N18.30]     Chronic diastolic (congestive) heart failure (HCC) [I50.32]     Pulmonary edema [J81.1]        Acute hypoxic respiratory failure: Suspected pulmonary embolism  Responsive to mechanical ventilation  Continue to monitor  COVID-19 NEGATIVE  Probably fluid overload vs pneumonia  - afebrile but has whie count  -on abx, consider adding dose of  vanc.     Suspected pulmonary embolism:  Formal echocardiogram to check for heart strain  Unable to perform CTA due to acute kidney function  VQ scan ordered  Empirically on heparin GTT  This was placed on hold as she started having dark red OG output   I changed IV protonix to BID. OG output is no longer blood tinged. Can likely resume heparin gtt.     Acute on chronic HFpEF exacerbation:  Lasix drip initiated. Now on 40 mg iv TID  Nephro is following, as is CHF nurse     Pneumonia:  Suspected on CT imaging with leukocytosis  Patient will be empirically treated with Zyvox and cefepime. Now just the cefepime. Blood culture grew staph epidermidis in 1 culture. Probable contamination     Hypertensive urgency:  Better  On lasix, monitor BP     Acute on chronic renal failure:  Being followed by nephrology  Suspect she will need dialysis       Normocytic anemia:  Iron panel ordered, Hemoccult     Proteinuria: Protein to creatinine ratio pending to check for nephrotic syndrome     Chronic medical conditions:  Type 2 Diabetes: Insulin sliding scale  Hyperlipidemia: Continue home medication  Class II obesity:Complicating assessment and treatment. Placing patient at risk for multiple co-morbidities as well as early death and contributing to the patient's presentation. Education, and counseling       DVT Prophylaxis: heparin   Diet: Diet NPO  Code Status: Full Code    PT/OT Eval Status: when appropriate. Dispo - she remains on the vent   Trying to diurese, covered for pneumonia  Medical decision making remains high.   33 minutes of critical care time spent    Benigno Bray MD

## 2021-08-30 NOTE — PROGRESS NOTES
MD Gallito Dumont MD Butler Roller, MD               Office: [phone]                      Fax: [phone]             [address] [place] [address]                   NEPHROLOGY CVU INPATIENT PROGRESS NOTE:     PATIENT NAME: Ignacia Johnson  : 1975  MRN: [id number]       RECOMMENDATIONS:   -Continue Lasix is making decent urine, Rx: IV lasix 60 -> 40- TID, follow response, might add Metolazone   -?LARRY, w/ no h/o resistant HTN,   - check RAA levels, w/ h/o hypokalemia -unable to check as per labs   -Replete K, mag has been controlled,  -Monitor acidosis,  -Hold home dose of aldactone, lisinopril,  -Follow echo results -in process of the morning   -Last echo-2020  moderate concentric LVH, normal size and function with EF   of 39%, normal diastolic function  -PNA Rx w/ iv abx    -BP is improving w/ hydralazine, use PRN for >150,  -vent mx per [facility]    -No urgent indication for dialysis currently, but needs close monitoring, as might need insulin next 1-2 days, if no significant improvement with aggressive diuresis. - at higher risk for decompensation, needing closer monitoring. D/C plan from renal stand point:  - unclear, as critically ill     Discussed with patient's treatment team-  CVU nurse, intensivist-Dr. Lyons      IMPRESSION:       Admitted for:  Acute respiratory failure ([facility] [address] [J96.00]  Encephalopathy [G93.40]  Respiratory failure with hypoxia, unspecified chronicity ([address] [J96.91]  Pneumonia due to infectious organism, unspecified laterality, unspecified part of lung [J18.9]      KINZA (on proteinuric CKD: 4):  Worsening  - BL Scr-last known 2.5, in 2021,   ---> 4.6 on admission  -: Etiology of KINZA -presumed ATN in the setting of pneumonia, unclear if it this is advancing CKD    History of nephrotic range proteinuria,   - thought to be from diabetic nephropathy With CKD stage III -> so high risk of advanced CKD,  -Follows with Dr. Ch[place] Rayo  -Noncompliance, last follow-up was 2/2021 then lost for follow-up    Associated problems:   Hypokalemia-needing repletion  Acidosis, non-anion gap-  Hypomagnesemia-  Hypophosphatemia  Anemia, chronic disease-worsening        Other major problems: Management per primary and other consulting teams.   //Acute hypoxic respiratory failure -needing intubation  // Multifocal airspace disease that likely represents a combination of aspiration/pneumonia and pulmonary edema  //Fluid overload  -COVID was ruled out  //History of uncontrolled diabetes last A1c was 11.3    // Hypertension un-controlled,       Hospital Problems         Last Modified POA    Pulmonary edema 8/28/2021 Yes    Chronic kidney disease (CKD), stage III (moderate) (HCC) 8/28/2021 Yes    Chronic diastolic (congestive) heart failure ([facility] [address] 8/28/2021 Yes    Acute on chronic diastolic heart failure ([facility] [address] 8/28/2021 Yes    Acute respiratory failure ([facility] [address] 8/27/2021 Yes        : other supportive care :   - Check daily renal function panel with electrolytes-phosphorus  - Strict monitoring of I/Os, daily weight  - Renal feeds/diet  - Current medications reviewed. - Nephrotoxic medications have been discontinued. - Dose adjusted and appropriate. - Dose meds for eGFR <15 mL/min/1.73m2 during KINZA    - Avoid heavy opioids due to renal failure - may use very low dose dilaudid / fentanyl with close monitoring of CNS and respiratory depression. Please refer to the orders. High Complexity. Multiple complex problems. Discussed with patient 's treatment team- ICU team, nurse     Thank you for allowing me to participate in this patient's care. Please do not hesitate to contact me anytime. We will follow along with you. Yary Fowler MD,  [facility] [address] [place]: [phone] or [employer]  Fax: [phone]     Severally ill, at risk of impending organ failure needing ICU higher level of care/monitoring.    Time spent  35 minutes that included face-to-face meeting/discussion with patient's treatment team (including primary/referring team and other consultants; included coordination of care with the treatment team; and review of patient's electronic medical records and ordering appropriates tests.         ========================================================   ========================================================   Subjective:   Patient currently still critically ill, intubated sedated, 30% FiO2  Continues to have Good urine output with Lasix IV  Past medical, Surgical, Social, Family medical history reviewed by me. MEDICATIONS: reviewed by me. Medications Prior to Admission:  No current facility-administered medications on file prior to encounter. Current Outpatient Medications on File Prior to Encounter   Medication Sig Dispense Refill    Dulaglutide 0.75 MG/0.5ML SOPN Inject 0.75 mg into the skin once a week 4 pen 1    ibuprofen (ADVIL;MOTRIN) 200 MG CAPS Take 1 capsule by mouth      furosemide (LASIX) 80 MG tablet Take 80 mg by mouth every morning 80mg in the morning 40mg in the evening      amLODIPine (NORVASC) 10 MG tablet Take 1 tablet by mouth daily 30 tablet 1    furosemide (LASIX) 40 MG tablet Take 1 tablet by mouth every evening 30 tablet 1    spironolactone (ALDACTONE) 50 MG tablet Take 1 tablet by mouth daily 30 tablet 2    insulin glargine (LANTUS;BASAGLAR) 100 UNIT/ML injection pen Inject 30 Units into the skin daily 4 pen 2    lisinopril (PRINIVIL;ZESTRIL) 40 MG tablet Take 1 tablet by mouth daily 30 tablet 2    atorvastatin (LIPITOR) 40 MG tablet Take 1 tablet by mouth nightly 30 tablet 3    Insulin Pen Needle 29G X 12.7MM MISC 1 each by Does not apply route daily 100 each 5    propranolol (INDERAL LA) 80 MG extended release capsule Take 1 capsule by mouth every morning 30 capsule 2    LORazepam (ATIVAN) 0.5 MG tablet Take 0.5 mg by mouth 2 times daily as needed for Anxiety.       aspirin 81 MG EC tablet Take 1 tablet by mouth daily 30 tablet 3    pregabalin (LYRICA) 75 MG capsule Take 1 capsule by mouth nightly for 7 days. 7 capsule 0    sertraline (ZOLOFT) 50 MG tablet Take 1 tablet by mouth daily 30 tablet 3    glucose monitoring kit (FREESTYLE) monitoring kit 1 kit by Does not apply route daily 1 kit 0    insulin lispro, 1 Unit Dial, 100 UNIT/ML SOPN Inject 0-6 Units into the skin 3 times daily (with meals) **Corrective Low Dose Algorithm**  Glucose: Dose:               No Insulin  140-199 1 Unit  200-249 2 Units  250-299 3 Units  300-349 4 Units  350-399 5 Units  Over 399 6 Units (Patient taking differently: Inject 6-12 Units into the skin 3 times daily (with meals) **Corrective Low Dose Algorithm**  Glucose: Dose:               No Insulin  140-199 1 Unit  200-249 2 Units  250-299 3 Units  300-349 4 Units  350-399 5 Units  Over 399 6 Units) 3 pen 0    glucose blood VI test strips (VICTORY AGM-4000 TEST) strip Test four times daily until controlled and then three times daily.   Code 250.02  ONE TOUCH ULTRA MONITOR 100 strip 12         Current Facility-Administered Medications:     furosemide (LASIX) injection 60 mg, 60 mg, IntraVENous, TID, Erica Cárdenas MD, 60 mg at 08/29/21 2106    cefepime (MAXIPIME) 1000 mg IVPB minibag, 1,000 mg, IntraVENous, Q24H, Haider Fernando MD    insulin lispro (1 Unit Dial) 0-6 Units, 0-6 Units, Subcutaneous, Q4H, Brittany Dejesus, DO    glucose (GLUTOSE) 40 % oral gel 15 g, 15 g, Oral, PRN, Ahmad IRA Archerzi, DO    dextrose 50 % IV solution, 12.5 g, IntraVENous, PRN, Ahmad IRA Dejesus, DO    glucagon (rDNA) injection 1 mg, 1 mg, IntraMUSCular, PRN, Ahmad IRA Archerzi, DO    dextrose 5 % solution, 100 mL/hr, IntraVENous, PRN, Ahmad A Oleg, DO    potassium chloride 20 mEq/50 mL IVPB (Central Line), 20 mEq, IntraVENous, PRN, Haider Fernando MD, Stopped at 08/30/21 0626    pantoprazole (PROTONIX) injection 40 mg, 40 mg, IntraVENous, BID, Haider Minor, MD, 40 mg at 08/29/21 2106    propofol injection, 5-50 mcg/kg/min, IntraVENous, Titrated, Eyal Nowak MD, Last Rate: 33.3 mL/hr at 08/30/21 0654, 50 mcg/kg/min at 08/30/21 0654    fentaNYL (SUBLIMAZE) injection 50 mcg, 50 mcg, IntraVENous, Q1H PRN, Eyal Nowak MD    heparin (porcine) injection 8,890 Units, 80 Units/kg, IntraVENous, PRN, Eyal Nowak MD    heparin (porcine) injection 4,440 Units, 40 Units/kg, IntraVENous, PRN, Eyal Nowak MD    heparin 25,000 units in dextrose 5% 250 mL (premix) infusion, 5-30 Units/kg/hr, IntraVENous, Continuous, Eyal Nowak MD, Last Rate: 11.1 mL/hr at 08/30/21 0654, 10 Units/kg/hr at 08/30/21 0654    sodium chloride flush 0.9 % injection 5-40 mL, 5-40 mL, IntraVENous, 2 times per day, Zion Dejesus DO, 10 mL at 08/29/21 2114    sodium chloride flush 0.9 % injection 5-40 mL, 5-40 mL, IntraVENous, PRN, Brittany Dejesus DO    0.9 % sodium chloride infusion, 25 mL, IntraVENous, PRN, Zion Dejesus DO, Stopped at 08/29/21 2301    ondansetron (ZOFRAN-ODT) disintegrating tablet 4 mg, 4 mg, Oral, Q8H PRN **OR** ondansetron (ZOFRAN) injection 4 mg, 4 mg, IntraVENous, Q6H PRN, Brittany Dejesus DO    polyethylene glycol (GLYCOLAX) packet 17 g, 17 g, Oral, Daily PRN, Zion Dejesus DO    acetaminophen (TYLENOL) tablet 650 mg, 650 mg, Oral, Q6H PRN **OR** acetaminophen (TYLENOL) suppository 650 mg, 650 mg, Rectal, Q6H PRN, Brittany Dejesus DO    linezolid (ZYVOX) IVPB 600 mg, 600 mg, IntraVENous, Q12H, Brittany Dejesus DO, Stopped at 08/30/21 0001    hydrALAZINE (APRESOLINE) injection 10 mg, 10 mg, IntraVENous, Q4H PRN, Brittany Dejesus DO, 10 mg at 08/28/21 1121      REVIEW OF SYSTEMS:     Review of systems not obtained due to patient factors - intubation       PHYSICAL EXAM:  Recent vital signs and recent I/Os reviewed by me.      Wt Readings from Last 3 Encounters:   08/30/21 228 lb 9.9 oz (103.7 kg)   07/08/21 244 lb 11.2 oz (111 kg)   02/26/21 237 lb 3.2 oz (107.6 kg) BP Readings from Last 3 Encounters:   08/30/21 (!) 160/66   07/08/21 (!) 160/100   02/26/21 133/86     Patient Vitals for the past 24 hrs:   BP Temp Temp src Pulse Resp SpO2 Weight   08/30/21 0600 (!) 160/66 97.2 °F (36.2 °C) Bladder 78 16 100 % --   08/30/21 0400 (!) 162/81 97.2 °F (36.2 °C) Bladder 75 15 98 % 228 lb 9.9 oz (103.7 kg)   08/30/21 0227 -- -- -- 80 16 100 % --   08/30/21 0200 (!) 161/70 -- -- 80 16 99 % --   08/30/21 0000 (!) 154/68 -- -- 80 16 100 % --   08/29/21 2300 -- -- -- -- 16 100 % --   08/29/21 2200 (!) 141/62 -- -- 79 16 99 % --   08/29/21 2000 (!) 159/65 97.7 °F (36.5 °C) Bladder 76 16 98 % --   08/29/21 1927 -- -- -- 71 16 98 % --   08/29/21 1800 (!) 121/57 97.3 °F (36.3 °C) Bladder 69 16 100 % --   08/29/21 1600 (!) 157/69 97.4 °F (36.3 °C) Bladder 77 16 100 % --   08/29/21 1500 -- -- -- 73 16 99 % --   08/29/21 1400 (!) 164/71 97.3 °F (36.3 °C) Bladder 79 16 99 % --   08/29/21 1300 -- 97.4 °F (36.3 °C) Bladder -- -- -- --   08/29/21 1200 (!) 121/58 97.4 °F (36.3 °C) Bladder 73 16 99 % --   08/29/21 1000 (!) 141/62 97.8 °F (36.6 °C) Bladder 76 16 97 % --       Intake/Output Summary (Last 24 hours) at 8/30/2021 7517  Last data filed at 8/30/2021 0741  Gross per 24 hour   Intake 4066.24 ml   Output 2670 ml   Net 1396.24 ml          Constitutional: Poorly responsive, Intubated and  obese habitus  Eyes: Conjunctiva clear and Noscleral icterus  Ear, Nose, and Throat: moist oral mucosa; ET to vent  Neck: Trachea midline,  No jugular venous distension  Cardiovascular: Regular rate and ryhthm, normal S1 and S2,    Respiratory: Mechanically ventilated; Lung sounds notable for synchronous vent-associated breath sounds  Abdomen:  distended. Normal bowel sounds. : Meza catheter is inserted, draining urine  Skin: no rash,  bruises on visible body area  Musculoskeletal: No clubbing or cyanosis of digits. and Normocephalic. Extremitties: +++ peripheral edema, no deformities. Neurologic:Unable to obtain as intubated/sedated. Psychiatric: Unable to obtain as intubated/sedated. DATA:  Diagnostic tests reviewed by me for today's visit:   (AS NEEDED FOR MY EVALUATION AND MANAGEMENT). Recent Labs     08/27/21 1858 08/27/21 1858 08/28/21  0420 08/28/21  1303 08/29/21  0415 08/29/21  1430 08/30/21  0425   WBC 21.0*  --  14.7*  --  15.8*  --  16.6*   HCT 30.2*   < > 25.5* 24.3* 22.1* 22.6* 23.4*     --  210  --  251  --  251    < > = values in this interval not displayed. Iron Saturation:  No components found for: PERCENTFE  FERRITIN:    Lab Results   Component Value Date    FERRITIN 112.0 08/28/2021     IRON:    Lab Results   Component Value Date    IRON 22 08/28/2021     TIBC:    Lab Results   Component Value Date    TIBC 184 08/28/2021       Recent Labs     08/27/21 1858 08/28/21  0420 08/29/21  0415 08/30/21  0425    138 138 136   K 3.9 3.3* 3.5 3.2*    108 109 106   CO2 17* 16* 16* 14*   BUN 37* 40* 43* 46*   CREATININE 4.6* 4.8* 5.3* 5.5*     Recent Labs     08/27/21 1858 08/28/21  0420 08/29/21  0415 08/30/21  0425   CALCIUM 8.4 7.6* 6.4* 7.5*   MG  --  1.70* 2.00 1.90   PHOS  --  5.1* 5.0* 5.5*     No results for input(s): PH, PCO2, PO2 in the last 72 hours.     Invalid input(s): Marcelina Son    ABG:  No results found for: PH, PCO2, PO2, HCO3, BE, THGB, TCO2, O2SAT  VBG:    Lab Results   Component Value Date    PHVEN 7.339 08/30/2021    RLX3KDM 34.3 02/23/2021    BEVEN -4.6 02/23/2021    A9RITSTK 100 02/23/2021       LDH:  No results found for: LDH  Uric Acid:  No results found for: LABURIC, URICACID    PT/INR:    Lab Results   Component Value Date    PROTIME 11.2 08/27/2021    INR 0.99 08/27/2021     Warfarin PT/INR:  No components found for: Michaell Iron  PTT:    Lab Results   Component Value Date    APTT 110.8 08/30/2021   [APTT}  Last 3 Troponin:    Lab Results   Component Value Date    TROPONINI 0.23 08/28/2021    TROPONINI 0.22 08/27/2021    TROPONINI 0.24 08/27/2021       U/A:    Lab Results   Component Value Date    COLORU YELLOW 08/27/2021    PROTEINU >300 08/27/2021    PHUR 6.0 08/28/2021    WBCUA 7 08/27/2021    RBCUA 3 08/27/2021    CLARITYU Clear 08/27/2021    SPECGRAV 1.013 08/27/2021    LEUKOCYTESUR Negative 08/27/2021    UROBILINOGEN 0.2 08/27/2021    BILIRUBINUR Negative 08/27/2021    BLOODU SMALL 08/27/2021    GLUCOSEU 250 08/27/2021     Microalbumen/Creatinine ratio:  No components found for: RUCREAT  24 Hour Urine for Protein:  No components found for: RAWUPRO, UHRS3, GWFE92RT, UTV3  24 Hour Urine for Creatinine Clearance:  No components found for: CREAT4, UHRS10, UTV10  Urine Toxicology:  No components found for: Pilar Spark, IBENZO, ICOCAINE, IMARTHC, IOPIATES, IPHENCYC    HgBA1c:    Lab Results   Component Value Date    LABA1C 5.7 08/27/2021     RPR:  No results found for: RPR  HIV:  No results found for: HIV  NILSA:    Lab Results   Component Value Date    NILSA Negative 04/25/2020     RF:  No results found for: RF  DSDNA:  No components found for: DNA  AMYLASE:  No results found for: AMYLASE  LIPASE:    Lab Results   Component Value Date    LIPASE 14.0 04/25/2020     Fibrinogen Level:  No components found for: FIB       BELOW MENTIONED RADIOLOGY STUDY RESULTS BY ME (AS NEEDED FOR MY EVALUATION AND MANAGEMENT). CT HEAD WO CONTRAST    Result Date: 8/27/2021  EXAMINATION: CT OF THE HEAD WITHOUT CONTRAST; CT OF THE CHEST WITHOUT CONTRAST  8/27/2021 7:12 pm TECHNIQUE: CT of the head was performed without the administration of intravenous contrast. Dose modulation, iterative reconstruction, and/or weight based adjustment of the mA/kV was utilized to reduce the radiation dose to as low as reasonably achievable.; CT of the chest was performed without the administration of intravenous contrast. Multiplanar reformatted images are provided for review.  Dose modulation, iterative reconstruction, and/or weight based adjustment of the mA/kV was utilized to reduce the radiation dose to as low as reasonably achievable. COMPARISON: CT head 08/27/2020. HISTORY: ORDERING SYSTEM PROVIDED HISTORY: AMS TECHNOLOGIST PROVIDED HISTORY: Has a \"code stroke\" or \"stroke alert\" been called? ->No Reason for exam:->AMS Decision Support Exception - unselect if not a suspected or confirmed emergency medical condition->Emergency Medical Condition (MA) Is the patient pregnant?->No Reason for Exam: Respiratory Distress (Pt brought in [facility] after call from  for resp distress. O2 sat 70's, BMV in route. Pt will open eyes to voice. Pupils 8mm and fixed. ) Acuity: Acute Type of Exam: Initial; ORDERING SYSTEM PROVIDED HISTORY: AMS, respiratory distress TECHNOLOGIST PROVIDED HISTORY: Reason for exam:->AMS, respiratory distress Is the patient pregnant?->No Reason for Exam: Respiratory Distress (Pt brought in [facility] after call from  for resp distress. O2 sat 70's, BMV in route. Pt will open eyes to voice. Pupils 8mm and fixed. ) Acuity: Acute Type of Exam: Initial CT HEAD FINDINGS: BRAIN/VENTRICLES: No acute hemorrhage. Periventricular and subcortical hypoattenuation is nonspecific. Interval chronic lacunar infarcts in the left corona radiata, thalamus, and internal capsule. Artifact partially obscures the laureano. Artifact partially obscures the inferior cerebellum. Ignacio Fern white differentiation appears maintained given artifact near the skull base and through the posterior fossa. Mild prominence of the ventricles noted. Overall ventricle size appears increased from prior exam.  There is no midline shift. Basal cisterns appear patent. ORBITS: Visualized orbits appear unremarkable on this unenhanced exam. SINUSES: Mild mucosal thickening of the ethmoid and sphenoid sinuses. Visualized mastoid air cells appear clear. SOFT TISSUES/SKULL: No depressed calvarial fracture. Fluid in the nasopharynx and oropharynx.  CT HEAD IMPRESSION: No acute hemorrhage or midline shift. Increased ventricle size compared to prior exam.  Correlate with presentation to exclude acute hydrocephalus. Other findings as described. CT CHEST FINDINGS: Mediastinum: Heart is borderline enlarged. Trace pericardial effusion or thickening. Aorta is within normal limits in size. Pulmonary arteries are within normal limits in size. . Lungs/pleura: Central airways are patent. Endotracheal tube with tip terminating in the distal 3rd subglottic trachea. Secretions noted in the trachea. Opacification of segmental and subsegmental bronchi. Ground-glass the confluent airspace disease. Interlobular septal thickening. Small to moderate pleural effusions. No pneumothorax. Soft Tissues/Bones: Suboptimal evaluation for adenopathy without intravenous contrast. Mediastinal adenopathy. Diffuse body wall edema. Scattered degenerative changes noted in the visualized spine without spondylolisthesis. Upper Abdomen: Limited images of the upper abdomen are unremarkable given lack of intravenous contrast. CT CHEST IMPRESSION: 1.   1. Multifocal airspace disease that likely represents a combination of aspiration/pneumonia and pulmonary edema. 2. Other findings as described. CT CHEST WO CONTRAST    Result Date: 8/27/2021  EXAMINATION: CT OF THE HEAD WITHOUT CONTRAST; CT OF THE CHEST WITHOUT CONTRAST  8/27/2021 7:12 pm TECHNIQUE: CT of the head was performed without the administration of intravenous contrast. Dose modulation, iterative reconstruction, and/or weight based adjustment of the mA/kV was utilized to reduce the radiation dose to as low as reasonably achievable.; CT of the chest was performed without the administration of intravenous contrast. Multiplanar reformatted images are provided for review. Dose modulation, iterative reconstruction, and/or weight based adjustment of the mA/kV was utilized to reduce the radiation dose to as low as reasonably achievable. COMPARISON: CT head 08/27/2020. HISTORY: ORDERING SYSTEM PROVIDED HISTORY: AMS TECHNOLOGIST PROVIDED HISTORY: Has a \"code stroke\" or \"stroke alert\" been called? ->No Reason for exam:->AMS Decision Support Exception - unselect if not a suspected or confirmed emergency medical condition->Emergency Medical Condition (MA) Is the patient pregnant?->No Reason for Exam: Respiratory Distress (Pt brought in [facility] after call from  for resp distress. O2 sat 70's, BMV in route. Pt will open eyes to voice. Pupils 8mm and fixed. ) Acuity: Acute Type of Exam: Initial; ORDERING SYSTEM PROVIDED HISTORY: AMS, respiratory distress TECHNOLOGIST PROVIDED HISTORY: Reason for exam:->AMS, respiratory distress Is the patient pregnant?->No Reason for Exam: Respiratory Distress (Pt brought in [facility] after call from  for resp distress. O2 sat 70's, BMV in route. Pt will open eyes to voice. Pupils 8mm and fixed. ) Acuity: Acute Type of Exam: Initial CT HEAD FINDINGS: BRAIN/VENTRICLES: No acute hemorrhage. Periventricular and subcortical hypoattenuation is nonspecific. Interval chronic lacunar infarcts in the left corona radiata, thalamus, and internal capsule. Artifact partially obscures the laureano. Artifact partially obscures the inferior cerebellum. Maria Luisa Deem white differentiation appears maintained given artifact near the skull base and through the posterior fossa. Mild prominence of the ventricles noted. Overall ventricle size appears increased from prior exam.  There is no midline shift. Basal cisterns appear patent. ORBITS: Visualized orbits appear unremarkable on this unenhanced exam. SINUSES: Mild mucosal thickening of the ethmoid and sphenoid sinuses. Visualized mastoid air cells appear clear. SOFT TISSUES/SKULL: No depressed calvarial fracture. Fluid in the nasopharynx and oropharynx. CT HEAD IMPRESSION: No acute hemorrhage or midline shift.  Increased ventricle size compared to prior exam. correct clinical setting. There is left basilar reflecting airspace disease, atelectasis or pleural effusion. There is right basilar airspace disease. There is no appreciable pneumothorax. BONES/SOFT TISSUE: No acute abnormality. Endotracheal tube and enteric tube are in satisfactory position. The left IJ central venous catheter tip projects over the area of the left brachiocephalic vein. Right basilar airspace disease. Left basilar opacification reflecting airspace disease, atelectasis or pleural effusion.                 Conclusions      Summary   *Left ventricle - moderate concentric LVH, normal size and function with EF   of 55%   *Mitral valve - mild regurgitation   *Tricuspid valve - trivial regurgitation   *Bubble study - negative      Signature      ------------------------------------------------------------------   Electronically signed by Thony Stanley MD (Interpreting   physician) on 08/31/2020 at 02:48 PM   ------------------------------------------------------------------      Findings

## 2021-08-31 ENCOUNTER — APPOINTMENT (OUTPATIENT)
Dept: GENERAL RADIOLOGY | Age: 46
DRG: 720 | End: 2021-08-31
Payer: COMMERCIAL

## 2021-08-31 ENCOUNTER — APPOINTMENT (OUTPATIENT)
Dept: INTERVENTIONAL RADIOLOGY/VASCULAR | Age: 46
DRG: 720 | End: 2021-08-31
Payer: COMMERCIAL

## 2021-08-31 LAB
A/G RATIO: 0.6 (ref 1.1–2.2)
ALBUMIN SERPL-MCNC: 1.7 G/DL (ref 3.4–5)
ALP BLD-CCNC: 114 U/L (ref 40–129)
ALT SERPL-CCNC: 6 U/L (ref 10–40)
ANION GAP SERPL CALCULATED.3IONS-SCNC: 15 MMOL/L (ref 3–16)
APTT: 58.5 SEC (ref 26.2–38.6)
AST SERPL-CCNC: 13 U/L (ref 15–37)
BASOPHILS ABSOLUTE: 0.1 K/UL (ref 0–0.2)
BASOPHILS RELATIVE PERCENT: 0.6 %
BILIRUB SERPL-MCNC: <0.2 MG/DL (ref 0–1)
BLOOD CULTURE, ROUTINE: ABNORMAL
BLOOD CULTURE, ROUTINE: ABNORMAL
BUN BLDV-MCNC: 49 MG/DL (ref 7–20)
CALCIUM IONIZED: 1.02 MMOL/L (ref 1.12–1.32)
CALCIUM SERPL-MCNC: 7.1 MG/DL (ref 8.3–10.6)
CHLORIDE BLD-SCNC: 110 MMOL/L (ref 99–110)
CO2: 15 MMOL/L (ref 21–32)
CREAT SERPL-MCNC: 5.8 MG/DL (ref 0.6–1.1)
CULTURE, BLOOD 2: NORMAL
EOSINOPHILS ABSOLUTE: 1.1 K/UL (ref 0–0.6)
EOSINOPHILS RELATIVE PERCENT: 7.5 %
GFR AFRICAN AMERICAN: 9
GFR NON-AFRICAN AMERICAN: 8
GLOBULIN: 3 G/DL
GLUCOSE BLD-MCNC: 85 MG/DL (ref 70–99)
GLUCOSE BLD-MCNC: 87 MG/DL (ref 70–99)
GLUCOSE BLD-MCNC: 89 MG/DL (ref 70–99)
GLUCOSE BLD-MCNC: 90 MG/DL (ref 70–99)
GLUCOSE BLD-MCNC: 92 MG/DL (ref 70–99)
GLUCOSE BLD-MCNC: 97 MG/DL (ref 70–99)
HBV SURFACE AB TITR SER: <3.5 MIU/ML
HCT VFR BLD CALC: 21.4 % (ref 36–48)
HEMOGLOBIN: 7 G/DL (ref 12–16)
HEPATITIS B SURFACE ANTIGEN INTERPRETATION: NORMAL
LACTIC ACID: 0.9 MMOL/L (ref 0.4–2)
LYMPHOCYTES ABSOLUTE: 2.2 K/UL (ref 1–5.1)
LYMPHOCYTES RELATIVE PERCENT: 15.2 %
MAGNESIUM: 1.8 MG/DL (ref 1.8–2.4)
MCH RBC QN AUTO: 29 PG (ref 26–34)
MCHC RBC AUTO-ENTMCNC: 32.9 G/DL (ref 31–36)
MCV RBC AUTO: 88.2 FL (ref 80–100)
MONOCYTES ABSOLUTE: 0.9 K/UL (ref 0–1.3)
MONOCYTES RELATIVE PERCENT: 5.9 %
NEUTROPHILS ABSOLUTE: 10.4 K/UL (ref 1.7–7.7)
NEUTROPHILS RELATIVE PERCENT: 70.8 %
ORGANISM: ABNORMAL
ORGANISM: ABNORMAL
PDW BLD-RTO: 16.3 % (ref 12.4–15.4)
PERFORMED ON: NORMAL
PH VENOUS: 7.35 (ref 7.35–7.45)
PHOSPHORUS: 6.2 MG/DL (ref 2.5–4.9)
PLATELET # BLD: 247 K/UL (ref 135–450)
PMV BLD AUTO: 9.2 FL (ref 5–10.5)
POTASSIUM SERPL-SCNC: 3.5 MMOL/L (ref 3.5–5.1)
PRO-BNP: ABNORMAL PG/ML (ref 0–124)
RBC # BLD: 2.43 M/UL (ref 4–5.2)
SODIUM BLD-SCNC: 140 MMOL/L (ref 136–145)
TOTAL CK: 34 U/L (ref 26–192)
TOTAL PROTEIN: 4.7 G/DL (ref 6.4–8.2)
TRIGL SERPL-MCNC: 319 MG/DL (ref 0–150)
WBC # BLD: 14.7 K/UL (ref 4–11)

## 2021-08-31 PROCEDURE — 71045 X-RAY EXAM CHEST 1 VIEW: CPT

## 2021-08-31 PROCEDURE — 5A1D70Z PERFORMANCE OF URINARY FILTRATION, INTERMITTENT, LESS THAN 6 HOURS PER DAY: ICD-10-PCS | Performed by: INTERNAL MEDICINE

## 2021-08-31 PROCEDURE — C9113 INJ PANTOPRAZOLE SODIUM, VIA: HCPCS | Performed by: INTERNAL MEDICINE

## 2021-08-31 PROCEDURE — 2580000003 HC RX 258: Performed by: INTERNAL MEDICINE

## 2021-08-31 PROCEDURE — 84478 ASSAY OF TRIGLYCERIDES: CPT

## 2021-08-31 PROCEDURE — 2700000000 HC OXYGEN THERAPY PER DAY

## 2021-08-31 PROCEDURE — 85025 COMPLETE CBC W/AUTO DIFF WBC: CPT

## 2021-08-31 PROCEDURE — 6360000002 HC RX W HCPCS: Performed by: STUDENT IN AN ORGANIZED HEALTH CARE EDUCATION/TRAINING PROGRAM

## 2021-08-31 PROCEDURE — 6360000002 HC RX W HCPCS: Performed by: EMERGENCY MEDICINE

## 2021-08-31 PROCEDURE — 85730 THROMBOPLASTIN TIME PARTIAL: CPT

## 2021-08-31 PROCEDURE — 2000000000 HC ICU R&B

## 2021-08-31 PROCEDURE — 80053 COMPREHEN METABOLIC PANEL: CPT

## 2021-08-31 PROCEDURE — 83605 ASSAY OF LACTIC ACID: CPT

## 2021-08-31 PROCEDURE — 86706 HEP B SURFACE ANTIBODY: CPT

## 2021-08-31 PROCEDURE — 77001 FLUOROGUIDE FOR VEIN DEVICE: CPT

## 2021-08-31 PROCEDURE — 02HV33Z INSERTION OF INFUSION DEVICE INTO SUPERIOR VENA CAVA, PERCUTANEOUS APPROACH: ICD-10-PCS | Performed by: INTERNAL MEDICINE

## 2021-08-31 PROCEDURE — 6360000002 HC RX W HCPCS: Performed by: INTERNAL MEDICINE

## 2021-08-31 PROCEDURE — 86704 HEP B CORE ANTIBODY TOTAL: CPT

## 2021-08-31 PROCEDURE — 36592 COLLECT BLOOD FROM PICC: CPT

## 2021-08-31 PROCEDURE — 94761 N-INVAS EAR/PLS OXIMETRY MLT: CPT

## 2021-08-31 PROCEDURE — 94003 VENT MGMT INPAT SUBQ DAY: CPT

## 2021-08-31 PROCEDURE — 76937 US GUIDE VASCULAR ACCESS: CPT

## 2021-08-31 PROCEDURE — 90935 HEMODIALYSIS ONE EVALUATION: CPT

## 2021-08-31 PROCEDURE — 36415 COLL VENOUS BLD VENIPUNCTURE: CPT

## 2021-08-31 PROCEDURE — 87340 HEPATITIS B SURFACE AG IA: CPT

## 2021-08-31 PROCEDURE — C1752 CATH,HEMODIALYSIS,SHORT-TERM: HCPCS

## 2021-08-31 PROCEDURE — 84100 ASSAY OF PHOSPHORUS: CPT

## 2021-08-31 PROCEDURE — 99291 CRITICAL CARE FIRST HOUR: CPT | Performed by: INTERNAL MEDICINE

## 2021-08-31 PROCEDURE — 36558 INSERT TUNNELED CV CATH: CPT

## 2021-08-31 PROCEDURE — 83735 ASSAY OF MAGNESIUM: CPT

## 2021-08-31 PROCEDURE — 83880 ASSAY OF NATRIURETIC PEPTIDE: CPT

## 2021-08-31 PROCEDURE — 82550 ASSAY OF CK (CPK): CPT

## 2021-08-31 PROCEDURE — 2500000003 HC RX 250 WO HCPCS: Performed by: STUDENT IN AN ORGANIZED HEALTH CARE EDUCATION/TRAINING PROGRAM

## 2021-08-31 PROCEDURE — 82330 ASSAY OF CALCIUM: CPT

## 2021-08-31 RX ORDER — HEPARIN SODIUM 5000 [USP'U]/ML
5000 INJECTION, SOLUTION INTRAVENOUS; SUBCUTANEOUS EVERY 8 HOURS SCHEDULED
Status: DISCONTINUED | OUTPATIENT
Start: 2021-08-31 | End: 2021-09-13 | Stop reason: HOSPADM

## 2021-08-31 RX ORDER — HEPARIN SODIUM 200 [USP'U]/100ML
30 INJECTION, SOLUTION INTRAVENOUS CONTINUOUS
Status: ACTIVE | OUTPATIENT
Start: 2021-08-31 | End: 2021-08-31

## 2021-08-31 RX ORDER — HEPARIN SODIUM 1000 [USP'U]/ML
2800 INJECTION, SOLUTION INTRAVENOUS; SUBCUTANEOUS PRN
Status: DISCONTINUED | OUTPATIENT
Start: 2021-08-31 | End: 2021-08-31

## 2021-08-31 RX ORDER — ALBUMIN (HUMAN) 12.5 G/50ML
25 SOLUTION INTRAVENOUS PRN
Status: DISCONTINUED | OUTPATIENT
Start: 2021-08-31 | End: 2021-09-13 | Stop reason: HOSPADM

## 2021-08-31 RX ORDER — HEPARIN SODIUM 1000 [USP'U]/ML
6000 INJECTION, SOLUTION INTRAVENOUS; SUBCUTANEOUS ONCE
Status: COMPLETED | OUTPATIENT
Start: 2021-08-31 | End: 2021-08-31

## 2021-08-31 RX ORDER — LIDOCAINE HYDROCHLORIDE 10 MG/ML
10 INJECTION, SOLUTION EPIDURAL; INFILTRATION; INTRACAUDAL; PERINEURAL ONCE
Status: COMPLETED | OUTPATIENT
Start: 2021-08-31 | End: 2021-08-31

## 2021-08-31 RX ORDER — HEPARIN SODIUM 1000 [USP'U]/ML
2400 INJECTION, SOLUTION INTRAVENOUS; SUBCUTANEOUS PRN
Status: DISCONTINUED | OUTPATIENT
Start: 2021-08-31 | End: 2021-09-08 | Stop reason: DRUGHIGH

## 2021-08-31 RX ADMIN — PANTOPRAZOLE SODIUM 40 MG: 40 INJECTION, POWDER, FOR SOLUTION INTRAVENOUS at 08:42

## 2021-08-31 RX ADMIN — Medication 12.5 MCG/HR: at 09:15

## 2021-08-31 RX ADMIN — HEPARIN SODIUM 2800 UNITS: 1000 INJECTION INTRAVENOUS; SUBCUTANEOUS at 18:52

## 2021-08-31 RX ADMIN — EPOETIN ALFA-EPBX 10000 UNITS: 10000 INJECTION, SOLUTION INTRAVENOUS; SUBCUTANEOUS at 18:56

## 2021-08-31 RX ADMIN — FUROSEMIDE 40 MG: 10 INJECTION, SOLUTION INTRAMUSCULAR; INTRAVENOUS at 08:42

## 2021-08-31 RX ADMIN — PROPOFOL 50 MCG/KG/MIN: 10 INJECTION, EMULSION INTRAVENOUS at 06:15

## 2021-08-31 RX ADMIN — CEFEPIME HYDROCHLORIDE 1000 MG: 1 INJECTION, POWDER, FOR SOLUTION INTRAMUSCULAR; INTRAVENOUS at 08:31

## 2021-08-31 RX ADMIN — Medication 10 ML: at 15:29

## 2021-08-31 RX ADMIN — PROPOFOL 35 MCG/KG/MIN: 10 INJECTION, EMULSION INTRAVENOUS at 12:10

## 2021-08-31 RX ADMIN — Medication 10 ML: at 15:32

## 2021-08-31 RX ADMIN — HEPARIN SODIUM 2.4 UNITS/ML: 1000 INJECTION INTRAVENOUS; SUBCUTANEOUS at 13:16

## 2021-08-31 RX ADMIN — Medication 10 ML: at 08:42

## 2021-08-31 RX ADMIN — HEPARIN SODIUM IN SODIUM CHLORIDE 30 ML/HR: 200 INJECTION INTRAVENOUS at 13:19

## 2021-08-31 RX ADMIN — PANTOPRAZOLE SODIUM 40 MG: 40 INJECTION, POWDER, FOR SOLUTION INTRAVENOUS at 20:12

## 2021-08-31 RX ADMIN — PROPOFOL 50 MCG/KG/MIN: 10 INJECTION, EMULSION INTRAVENOUS at 02:16

## 2021-08-31 RX ADMIN — PROPOFOL 50 MCG/KG/MIN: 10 INJECTION, EMULSION INTRAVENOUS at 08:32

## 2021-08-31 RX ADMIN — PROPOFOL 35 MCG/KG/MIN: 10 INJECTION, EMULSION INTRAVENOUS at 21:08

## 2021-08-31 RX ADMIN — LIDOCAINE HYDROCHLORIDE 10 ML: 10 INJECTION, SOLUTION EPIDURAL; INFILTRATION; INTRACAUDAL; PERINEURAL at 13:19

## 2021-08-31 RX ADMIN — Medication 45 MCG/HR: at 21:55

## 2021-08-31 RX ADMIN — FUROSEMIDE 40 MG: 10 INJECTION, SOLUTION INTRAMUSCULAR; INTRAVENOUS at 14:00

## 2021-08-31 RX ADMIN — PROPOFOL 35 MCG/KG/MIN: 10 INJECTION, EMULSION INTRAVENOUS at 15:33

## 2021-08-31 RX ADMIN — FUROSEMIDE 40 MG: 10 INJECTION, SOLUTION INTRAMUSCULAR; INTRAVENOUS at 20:12

## 2021-08-31 RX ADMIN — HEPARIN SODIUM 12 UNITS/KG/HR: 10000 INJECTION, SOLUTION INTRAVENOUS at 08:34

## 2021-08-31 ASSESSMENT — PULMONARY FUNCTION TESTS
PIF_VALUE: 30
PIF_VALUE: 27
PIF_VALUE: 30
PIF_VALUE: 29
PIF_VALUE: 27
PIF_VALUE: 25
PIF_VALUE: 38
PIF_VALUE: 27

## 2021-08-31 ASSESSMENT — PAIN SCALES - GENERAL: PAINLEVEL_OUTOF10: 0

## 2021-08-31 NOTE — PROGRESS NOTES
Comprehensive Nutrition Assessment    Type and Reason for Visit:  Initial (new vent)    Nutrition Recommendations/Plan:   1. Recommend TF initiation. Order: \"Diet: Tube feed continuous/ NPO\". Initiate Nepro (renal formula) at 15 mL/hr via OG. 2. Recommend 30 mL H20 flush q 4 hours. Monitor IVF infusion, Na labs and need for adjustments in water flush  3. Monitor TF tolerance (abd distention, bowel habits, N/V, cramping)  4. Recommend bowel regimen if medically appropriate  5. Check TG while on diprivan infusion, RD to order new TG lab  6. Monitor nutrition adequacy, pertinent labs, bowel habits, wt changes, and clinical progress    Nutrition Assessment:  New vent: 55 y.o female with PMH of hypertension, type 2 diabetes, CKD stage IV, HFpEF, hyperlipidemia admitted with respiratory distress. Intubated on 8/27 and sedated on fentanyl and propofol (at 33.3 ml/hr to provide 879 kcals from lipids) in the ICU. Nephrology following, possible need for dialysis. OG placed, plans to feed per MD. Recommendations included. Malnutrition Assessment:  Malnutrition Status:  Insufficient data (Will monitor when more information is available)    Context:  Acute Illness       Estimated Daily Nutrient Needs:  Energy (kcal):  4,991-0,659 kcals/day; Weight Used for Energy Requirements:   (Admission wt, 111 kg)     Protein (g):   g/day; Weight Used for Protein Requirements:  Ideal        Method Used for Fluid Requirements:  1 ml/kcal      Nutrition Related Findings:  + 1 pitting edema RLE, LLE, RUE and LUE. Phos 6.2, Cr 5.8, BUN 49. BG WNL, hemoglobin A1C 5.7. No BM recorded yet.  RD to order new TG lab      Wounds:  None       Current Nutrition Therapies:    Diet NPO  ADULT TUBE FEEDING; Orogastric; Renal Formula; Continuous; 15; No; 30; Q 4 hours  Current Tube Feeding (TF) Orders:  · Feeding Route: Orogastric  · Formula: Renal Formula  · Schedule: Continuous  · Water Flushes: 30 ml q 4 hours  · Goal TF & Flush Orders Provides: Nepro (renal formula) x 24 hours at 15 ml/hr to provide 360 ml TV, 648 kcals, 29 g protein and 262 ml free water. Free water flush of 30 ml q 4 for tube maintenance. Rate may change based on propofol titrations. Anthropometric Measures:  · Height: 5' 6\" (167.6 cm)  · Current Body Weight: 244 lb (110.7 kg)    · Ideal Body Weight: 130 lbs; % Ideal Body Weight 187.7 %   · BMI: 39.4  · BMI Categories: Obese Class 2 (BMI 35.0 -39.9)       Nutrition Diagnosis:   · Inadequate oral intake related to impaired respiratory function as evidenced by intubation, NPO or clear liquid status due to medical condition    Nutrition Interventions:   Food and/or Nutrient Delivery:  Continue NPO, Start Tube Feeding  Nutrition Education/Counseling:  Education not indicated   Coordination of Nutrition Care:  Continue to monitor while inpatient    Goals: Tolerate enteral nutrition at goal rate without any GI distress       Nutrition Monitoring and Evaluation:   Behavioral-Environmental Outcomes:  None Identified   Food/Nutrient Intake Outcomes:  Enteral Nutrition Intake/Tolerance  Physical Signs/Symptoms Outcomes:  Biochemical Data, Weight     Discharge Planning:     Too soon to determine     Electronically signed by Reginaldo Lan MS, RD, LD on 8/31/21 at 9:28 AM EDT    Contact: ISA/ Isidro Melchor

## 2021-08-31 NOTE — PROGRESS NOTES
08/31/21 0817   Vent Patient Data   Plateau Pressure 23 [id number]   Static Compliance 32 mL/cmH2O   Dynamic Compliance 23 mL/cmH2O

## 2021-08-31 NOTE — FLOWSHEET NOTE
08/31/21 1653 08/31/21 1933   Vital Signs   BP (!) 155/59 (!) 121/46   Pulse 84 89   Weight 216 lb 4.3 oz (98.1 kg) 212 lb 1.3 oz (96.2 kg)   Weight Method Bed scale  (1 pillow and 1 sheet left. without bedpump.) Bed scale  (1 pillow and 1 sheet left. without bed pump.)   Treatment time: 2.5 hours ( 1st HD )   Net UF:  1800 Ml removed. Pre weight: 98.1 kg  Post weight: 96.2 kg  EDW: TBD    Access used: Rt. Neck IJ  Access function: Good with  ml/ min    Medications or blood products given: Albumin 25% 25g and Retacrit 77765 units    Regular outpatient schedule: Acute    Summary of response to treatment: Pt tolerated tx at the beginning and middle of tx then BP slowly decreased at the end of tx. Copy of dialysis treatment record placed in chart, to be scanned into EMR.

## 2021-08-31 NOTE — PROGRESS NOTES
Hospitalist Progress Note      PCP: Samina Dumont    Date of Admission: 8/27/2021    Chief Complaint: Respiratory distress    Hospital Course: 55 y.o. female who presented to [facility] with past medical history of hypertension, type 2 diabetes, CKD stage IV, HFpEF, hyperlipidemia presented to the ED with chief complaint of respiratory distress.     History cannot be obtained due to patient being intubated sedated. On chart review patient had 3 or 4 arrival sitting in bed and also having some new onset dyspnea that was severe sudden onset and then started progressively turning blue in the lips for her  and EMS was called noted to have saturation 60% on muscles brought here emergently. Patient in the ED noted was unable to protect her airway thus was emergently intubated for lifesaving measures.       Subjective:     Patient remains intubated and sedated, currently on FiO2 30%, hemoglobin 7, leukocytosis improving, creatinine worsened to 5.8, proBNP 20,000 trending down, cumulative 1.7 L negative balance, currently on Lasix.        Medications:  Reviewed    Infusion Medications    fentaNYL 20 mcg/hr (08/31/21 1100)    dextrose      propofol 35 mcg/kg/min (08/31/21 1210)    sodium chloride Stopped (08/29/21 2301)     Scheduled Medications    [Held by provider] heparin (porcine)  5,000 Units SubCUTAneous 3 times per day    furosemide  40 mg IntraVENous TID    insulin lispro  0-6 Units SubCUTAneous Q4H    pantoprazole  40 mg IntraVENous BID    sodium chloride flush  5-40 mL IntraVENous 2 times per day     PRN Meds: glucose, dextrose, glucagon (rDNA), dextrose, fentanNYL, sodium chloride flush, sodium chloride, ondansetron **OR** ondansetron, polyethylene glycol, acetaminophen **OR** acetaminophen, hydrALAZINE      Intake/Output Summary (Last 24 hours) at 8/31/2021 1224  Last data filed at 8/31/2021 1211  Gross per 24 hour   Intake 1239.43 ml   Output 2835 ml   Net -1595.57 ml       Physical 08/30/21  0425 08/31/21  0448   CKTOTAL 66 39 34       Urinalysis:      Lab Results   Component Value Date    NITRU Negative 08/27/2021    WBCUA 7 08/27/2021    RBCUA 3 08/27/2021    BLOODU SMALL 08/27/2021    SPECGRAV 1.013 08/27/2021    GLUCOSEU 250 08/27/2021       Radiology:  XR CHEST PORTABLE   Final Result   Extensive bilateral pulmonary disease and pleural effusion, no significant   change over the past 48 hours. XR CHEST PORTABLE   Final Result   Enlargement of the cardiac silhouette with central vascular congestion as   well as bibasilar infiltrates and pleural effusions, which may represent a   combination of pulmonary edema as well as potential pneumonia. XR CHEST PORTABLE   Final Result   Endotracheal tube and enteric tube are in satisfactory position. The left IJ central venous catheter tip projects over the area of the left   brachiocephalic vein. Right basilar airspace disease. Left basilar opacification reflecting airspace disease, atelectasis or   pleural effusion.          CT CHEST WO CONTRAST   Final Result      CT HEAD WO CONTRAST   Final Result      NM LUNG VENT/PERFUSION (VQ)    (Results Pending)   VL Renal Arterial Duplex Complete    (Results Pending)   IR TUNNELED CVC PLACE WO SQ PORT/PUMP > 5 YEARS    (Results Pending)           Assessment/Plan:    Active Hospital Problems    Diagnosis     Acute respiratory failure ([facility] [address] [J96.00]     Acute on chronic diastolic heart failure (HCC) [I50.33]     Chronic kidney disease (CKD), stage III (moderate) (HCC) [N18.30]     Chronic diastolic (congestive) heart failure (HCC) [I50.32]     Pulmonary edema [J81.1]        Acute hypoxic respiratory failure requiring mechanical ventilation  Continue to monitor  Covid testing negative  Concern for pneumonia currently on cefepime  Leukocytosis improving     Acute pulmonary edema  proBNP trending down, patient was on Lasix per pulmonary  Probably will need CRRT/HD nephrology on

## 2021-08-31 NOTE — PROGRESS NOTES
MD Loida Maradiaga MD Marlin Senters, MD               Office: [phone]                      Fax: [phone]             [facility]                   NEPHROLOGY CVU INPATIENT PROGRESS NOTE:     PATIENT NAME: Negrita Jacinto  : 1975  MRN: [id number]       RECOMMENDATIONS:   -Reviewed CXR today am - not improving at all w/ diuresis   So dialysis is indicated now   Needs an access, prefer [facility] [place] w/ h/o advanced CKD, likely will need long-term    -Hemodialysis #1 today, with current BP no need for CRRT  Goal UF 2-3 L    - continue Lasix too- Rx: IV lasix  40- TID   -Monitor acidosis,  -Hold home dose of aldactone, lisinopril,  -echo results -  IVC size is dilated (>2.1 cm) but collapses > 50% with respiration consistent with elevated RA pressure (8 mmHg). -Last echo-2020  moderate concentric LVH, normal size and function with EF   of 52%, normal diastolic function    -PNA Rx w/ iv abx    -BP is improving w/ hydralazine, use PRN for >150,  -vent mx per [facility]    -No urgent indication for dialysis currently, but needs close monitoring, as might need insulin next 1-2 days, if no significant improvement with aggressive diuresis. - at higher risk for decompensation, needing closer monitoring. D/C plan from renal stand point:  - unclear, as critically ill     Discussed with patient's treatment team-  CVU nurse, hospitalist-Dr. Brian Domínguez  No family around, patient intubated, sedated so, this will be an emergent dialysis      IMPRESSION:       Admitted for:  Acute respiratory failure ([address] [J96.00]  Encephalopathy [G93.40]  Respiratory failure with hypoxia, unspecified chronicity ([address] [J96.91]  Pneumonia due to infectious organism, unspecified laterality, unspecified part of lung [J18.9]      KINZA (on proteinuric CKD: 4):  Worsening  - BL Scr-last known 2.5, in 2021,   ---> 4.6 on admission  -: Etiology of KINZA -presumed ATN in the setting of pneumonia, unclear if it this is advancing CKD    History of nephrotic range proteinuria,   - thought to be from diabetic nephropathy With CKD stage III -> so high risk of advanced CKD,  -Follows with Dr. Lea Peralta  -Noncompliance, last follow-up was 2/2021 then lost for follow-up    Associated problems:   Hypokalemia-needing repletion  Acidosis, non-anion gap-  Hypomagnesemia-  Hypophosphatemia  Anemia, chronic disease-worsening        Other major problems: Management per primary and other consulting teams.   //Acute hypoxic respiratory failure -needing intubation  // Multifocal airspace disease that likely represents a combination of aspiration/pneumonia and pulmonary edema  //Fluid overload  -COVID was ruled out  //History of uncontrolled diabetes last A1c was 11.3    // Hypertension un-controlled,       Hospital Problems         Last Modified POA    Pulmonary edema 8/28/2021 Yes    Chronic kidney disease (CKD), stage III (moderate) ([facility]) 8/28/2021 Yes    Chronic diastolic (congestive) heart failure ([facility] 8/28/2021 Yes    Acute on chronic diastolic heart failure ([facility] 8/28/2021 Yes    Acute respiratory failure ([address] 8/27/2021 Yes        : other supportive care :   - Check daily renal function panel with electrolytes-phosphorus  - Strict monitoring of I/Os, daily weight  - Renal feeds/diet  - Current medications reviewed. - Nephrotoxic medications have been discontinued. - Dose adjusted and appropriate. - Dose meds for eGFR <15 mL/min/1.73m2 during KINZA    - Avoid heavy opioids due to renal failure - may use very low dose dilaudid / fentanyl with close monitoring of CNS and respiratory depression. Please refer to the orders. High Complexity. Multiple complex problems. Discussed with patient 's treatment team- ICU team, nurse     Thank you for allowing me to participate in this patient's care. Please do not hesitate to contact me anytime. We will follow along with you.        Levander Gowers, MD,  [facility] [address] [place]: [phone] or Via [email or web address]  Fax: [phone]     Severally ill, at risk of impending organ failure needing ICU higher level of care/monitoring. Time spent  35 minutes that included face-to-face meeting/discussion with patient's treatment team (including primary/referring team and other consultants; included coordination of care with the treatment team; and review of patient's electronic medical records and ordering appropriates tests.         ========================================================   ========================================================   Subjective:   Patient currently still critically ill, intubated sedated, 30% FiO2  Continues to have Good urine output with Lasix IV  Some UOP   Unable to get extubated  Past medical, Surgical, Social, Family medical history reviewed by me. MEDICATIONS: reviewed by me. Medications Prior to Admission:  No current facility-administered medications on file prior to encounter.      Current Outpatient Medications on File Prior to Encounter   Medication Sig Dispense Refill    Dulaglutide 0.75 MG/0.5ML SOPN Inject 0.75 mg into the skin once a week 4 pen 1    ibuprofen (ADVIL;MOTRIN) 200 MG CAPS Take 1 capsule by mouth      furosemide (LASIX) 80 MG tablet Take 80 mg by mouth every morning 80mg in the morning 40mg in the evening      amLODIPine (NORVASC) 10 MG tablet Take 1 tablet by mouth daily 30 tablet 1    furosemide (LASIX) 40 MG tablet Take 1 tablet by mouth every evening 30 tablet 1    spironolactone (ALDACTONE) 50 MG tablet Take 1 tablet by mouth daily 30 tablet 2    insulin glargine (LANTUS;BASAGLAR) 100 UNIT/ML injection pen Inject 30 Units into the skin daily 4 pen 2    lisinopril (PRINIVIL;ZESTRIL) 40 MG tablet Take 1 tablet by mouth daily 30 tablet 2    atorvastatin (LIPITOR) 40 MG tablet Take 1 tablet by mouth nightly 30 tablet 3    Insulin Pen Needle 29G X 12.7MM MISC 1 each by Does not apply route daily 100 mL/hr, IntraVENous, PRN, Faith Dejesus DO    pantoprazole (PROTONIX) injection 40 mg, 40 mg, IntraVENous, BID, Oliva Alarcon MD, 40 mg at 08/30/21 2022    propofol injection, 5-50 mcg/kg/min, IntraVENous, Titrated, Guerita Harkins MD, Last Rate: 33.3 mL/hr at 08/31/21 0615, 50 mcg/kg/min at 08/31/21 0615    fentaNYL (SUBLIMAZE) injection 50 mcg, 50 mcg, IntraVENous, Q1H PRN, Guerita Harkins MD    heparin (porcine) injection 8,890 Units, 80 Units/kg, IntraVENous, PRN, Guerita Harkins MD    heparin (porcine) injection 4,440 Units, 40 Units/kg, IntraVENous, PRN, Guerita Harkins MD    heparin 25,000 units in dextrose 5% 250 mL (premix) infusion, 5-30 Units/kg/hr, IntraVENous, Continuous, Guerita Harkins MD, Last Rate: 13.3 mL/hr at 08/31/21 0658, 12 Units/kg/hr at 08/31/21 0658    sodium chloride flush 0.9 % injection 5-40 mL, 5-40 mL, IntraVENous, 2 times per day, Brittany Dejesus DO, 10 mL at 08/30/21 0935    sodium chloride flush 0.9 % injection 5-40 mL, 5-40 mL, IntraVENous, PRN, Brittany Dejessu DO, 10 mL at 08/30/21 1343    0.9 % sodium chloride infusion, 25 mL, IntraVENous, PRN, Faith Dejesus DO, Stopped at 08/29/21 2301    ondansetron (ZOFRAN-ODT) disintegrating tablet 4 mg, 4 mg, Oral, Q8H PRN **OR** ondansetron (ZOFRAN) injection 4 mg, 4 mg, IntraVENous, Q6H PRN, Brittany Dejesus DO    polyethylene glycol (GLYCOLAX) packet 17 g, 17 g, Oral, Daily PRN, Faith Dejesus DO    acetaminophen (TYLENOL) tablet 650 mg, 650 mg, Oral, Q6H PRN **OR** acetaminophen (TYLENOL) suppository 650 mg, 650 mg, Rectal, Q6H PRN, Ericafern Dejesus, DO    hydrALAZINE (APRESOLINE) injection 10 mg, 10 mg, IntraVENous, Q4H PRN, Brittany Dejesus DO, 10 mg at 08/28/21 1121      REVIEW OF SYSTEMS:     Review of systems not obtained due to patient factors - intubation       PHYSICAL EXAM:  Recent vital signs and recent I/Os reviewed by me.      Wt Readings from Last 3 Encounters:   08/31/21 222 lb 0.1 oz (100.7 kg)   07/08/21 244 lb 11.2 oz (111 kg)   02/26/21 237 lb 3.2 oz (107.6 kg)     BP Readings from Last 3 Encounters:   08/31/21 (!) 152/64   07/08/21 (!) 160/100   02/26/21 133/86     Patient Vitals for the past 24 hrs:   BP Temp Temp src Pulse Resp SpO2 Weight   08/31/21 0700 (!) 152/64 -- -- 79 16 99 % --   08/31/21 0614 -- -- -- -- -- -- 222 lb 0.1 oz (100.7 kg)   08/31/21 0600 (!) 151/64 -- -- 77 16 99 % --   08/31/21 0400 (!) 124/58 96.6 °F (35.9 °C) Temporal 72 16 99 % --   08/31/21 0346 -- -- -- 72 16 99 % --   08/31/21 0300 (!) 143/63 -- -- 74 16 99 % --   08/31/21 0200 (!) 147/61 -- -- 75 16 99 % --   08/31/21 0010 -- -- -- 71 16 99 % --   08/31/21 0000 (!) 132/58 96.2 °F (35.7 °C) Bladder 71 16 99 % --   08/30/21 2120 -- -- -- 73 16 100 % --   08/30/21 2000 (!) 134/58 96.7 °F (35.9 °C) Bladder 71 16 100 % --   08/30/21 1631 -- -- -- 73 16 100 % --   08/30/21 1600 -- 96.5 °F (35.8 °C) Bladder -- -- 100 % --   08/30/21 1304 -- -- -- 80 16 96 % --   08/30/21 1100 (!) 146/61 97.2 °F (36.2 °C) Bladder 74 16 100 % --   08/30/21 0922 -- -- -- 78 18 100 % --   08/30/21 0909 -- -- -- 76 16 100 % --   08/30/21 0908 (!) 153/63 97.1 °F (36.2 °C) Bladder 76 16 100 % --       Intake/Output Summary (Last 24 hours) at 8/31/2021 0755  Last data filed at 8/31/2021 7754  Gross per 24 hour   Intake 1180.64 ml   Output 2660 ml   Net -1479.36 ml          Constitutional: Poorly responsive, Intubated and  obese habitus  Eyes: Conjunctiva clear and Noscleral icterus  Ear, Nose, and Throat: moist oral mucosa; ET to vent  Neck: Trachea midline,  No jugular venous distension  Cardiovascular: Regular rate and ryhthm, normal S1 and S2,    Respiratory: Mechanically ventilated; Lung sounds notable for synchronous vent-associated breath sounds  Abdomen:  distended. Normal bowel sounds. : Meza catheter is inserted, draining urine  Skin: no rash,  bruises on visible body area  Musculoskeletal: No clubbing or cyanosis of digits. and Normocephalic.   Extremitties: +++ peripheral edema, no deformities. Neurologic:Unable to obtain as intubated/sedated. Psychiatric: Unable to obtain as intubated/sedated. DATA:  Diagnostic tests reviewed by me for today's visit:   (AS NEEDED FOR MY EVALUATION AND MANAGEMENT). Recent Labs     08/28/21  1303 08/29/21  0415 08/29/21  1430 08/30/21  0425 08/31/21  0448   WBC  --  15.8*  --  16.6* 14.7*   HCT 24.3* 22.1* 22.6* 23.4* 21.4*   PLT  --  251  --  251 247     Iron Saturation:  No components found for: PERCENTFE  FERRITIN:    Lab Results   Component Value Date    FERRITIN 112.0 08/28/2021     IRON:    Lab Results   Component Value Date    IRON 22 08/28/2021     TIBC:    Lab Results   Component Value Date    TIBC 184 08/28/2021       Recent Labs     08/29/21  0415 08/30/21  0425 08/30/21  1530 08/31/21  0448    136  --  140   K 3.5 3.2* 3.4* 3.5    106  --  110   CO2 16* 14*  --  15*   BUN 43* 46*  --  49*   CREATININE 5.3* 5.5*  --  5.8*     Recent Labs     08/29/21  0415 08/30/21  0425 08/31/21  0448   CALCIUM 6.4* 7.5* 7.1*   MG 2.00 1.90 1.80   PHOS 5.0* 5.5* 6.2*     No results for input(s): PH, PCO2, PO2 in the last 72 hours.     Invalid input(s): [employer]    ABG:  No results found for: PH, PCO2, PO2, HCO3, BE, THGB, TCO2, O2SAT  VBG:    Lab Results   Component Value Date    PHVEN 7.351 08/31/2021    TKG0MWG 34.3 02/23/2021    BEVEN -4.6 02/23/2021    D4MYPKGD 100 02/23/2021       LDH:  No results found for: LDH  Uric Acid:  No results found for: LABURIC, URICACID    PT/INR:    Lab Results   Component Value Date    PROTIME 11.2 08/27/2021    INR 0.99 08/27/2021     Warfarin PT/INR:  No components found for: Luan Nye  PTT:    Lab Results   Component Value Date    APTT 58.5 08/31/2021   [APTT}  Last 3 Troponin:    Lab Results   Component Value Date    TROPONINI 0.23 08/28/2021    TROPONINI 0.22 08/27/2021    TROPONINI 0.24 08/27/2021       U/A:    Lab Results   Component Value Date Correlate with presentation to exclude acute hydrocephalus. Other findings as described. CT CHEST FINDINGS: Mediastinum: Heart is borderline enlarged. Trace pericardial effusion or thickening. Aorta is within normal limits in size. Pulmonary arteries are within normal limits in size. . Lungs/pleura: Central airways are patent. Endotracheal tube with tip terminating in the distal 3rd subglottic trachea. Secretions noted in the trachea. Opacification of segmental and subsegmental bronchi. Ground-glass the confluent airspace disease. Interlobular septal thickening. Small to moderate pleural effusions. No pneumothorax. Soft Tissues/Bones: Suboptimal evaluation for adenopathy without intravenous contrast. Mediastinal adenopathy. Diffuse body wall edema. Scattered degenerative changes noted in the visualized spine without spondylolisthesis. Upper Abdomen: Limited images of the upper abdomen are unremarkable given lack of intravenous contrast. CT CHEST IMPRESSION: 1.   1. Multifocal airspace disease that likely represents a combination of aspiration/pneumonia and pulmonary edema. 2. Other findings as described. CT CHEST WO CONTRAST    Result Date: 8/27/2021  EXAMINATION: CT OF THE HEAD WITHOUT CONTRAST; CT OF THE CHEST WITHOUT CONTRAST  8/27/2021 7:12 pm TECHNIQUE: CT of the head was performed without the administration of intravenous contrast. Dose modulation, iterative reconstruction, and/or weight based adjustment of the mA/kV was utilized to reduce the radiation dose to as low as reasonably achievable.; CT of the chest was performed without the administration of intravenous contrast. Multiplanar reformatted images are provided for review. Dose modulation, iterative reconstruction, and/or weight based adjustment of the mA/kV was utilized to reduce the radiation dose to as low as reasonably achievable. COMPARISON: CT head 08/27/2020.  HISTORY: ORDERING SYSTEM PROVIDED HISTORY: [facility] TECHNOLOGIST PROVIDED HISTORY: Has a \"code stroke\" or \"stroke alert\" been called? ->No Reason for exam:->AMS Decision Support Exception - unselect if not a suspected or confirmed emergency medical condition->Emergency Medical Condition (MA) Is the patient pregnant?->No Reason for Exam: Respiratory Distress (Pt brought in [facility] after call from  for resp distress. O2 sat 70's, BMV in route. Pt will open eyes to voice. Pupils 8mm and fixed. ) Acuity: Acute Type of Exam: Initial; ORDERING SYSTEM PROVIDED HISTORY: AMS, respiratory distress TECHNOLOGIST PROVIDED HISTORY: Reason for exam:->AMS, respiratory distress Is the patient pregnant?->No Reason for Exam: Respiratory Distress (Pt brought in [facility] after call from  for resp distress. O2 sat 70's, BMV in route. Pt will open eyes to voice. Pupils 8mm and fixed. ) Acuity: Acute Type of Exam: Initial CT HEAD FINDINGS: BRAIN/VENTRICLES: No acute hemorrhage. Periventricular and subcortical hypoattenuation is nonspecific. Interval chronic lacunar infarcts in the left corona radiata, thalamus, and internal capsule. Artifact partially obscures the laureano. Artifact partially obscures the inferior cerebellum. Tristan Serene white differentiation appears maintained given artifact near the skull base and through the posterior fossa. Mild prominence of the ventricles noted. Overall ventricle size appears increased from prior exam.  There is no midline shift. Basal cisterns appear patent. ORBITS: Visualized orbits appear unremarkable on this unenhanced exam. SINUSES: Mild mucosal thickening of the ethmoid and sphenoid sinuses. Visualized mastoid air cells appear clear. SOFT TISSUES/SKULL: No depressed calvarial fracture. Fluid in the nasopharynx and oropharynx. CT HEAD IMPRESSION: No acute hemorrhage or midline shift. Increased ventricle size compared to prior exam.  Correlate with presentation to exclude acute hydrocephalus. Other findings as described.  CT CHEST FINDINGS: Mediastinum: Heart is borderline enlarged. Trace pericardial effusion or thickening. Aorta is within normal limits in size. Pulmonary arteries are within normal limits in size. . Lungs/pleura: Central airways are patent. Endotracheal tube with tip terminating in the distal 3rd subglottic trachea. Secretions noted in the trachea. Opacification of segmental and subsegmental bronchi. Ground-glass the confluent airspace disease. Interlobular septal thickening. Small to moderate pleural effusions. No pneumothorax. Soft Tissues/Bones: Suboptimal evaluation for adenopathy without intravenous contrast. Mediastinal adenopathy. Diffuse body wall edema. Scattered degenerative changes noted in the visualized spine without spondylolisthesis. Upper Abdomen: Limited images of the upper abdomen are unremarkable given lack of intravenous contrast. CT CHEST IMPRESSION: 1.   1. Multifocal airspace disease that likely represents a combination of aspiration/pneumonia and pulmonary edema. 2. Other findings as described. XR CHEST PORTABLE    Result Date: 8/28/2021  EXAMINATION: ONE XRAY VIEW OF THE CHEST 8/28/2021 1:21 am COMPARISON: Chest x-ray dated 02/24/2021. Lc Velasquez HISTORY: ORDERING SYSTEM PROVIDED HISTORY: central line and OG placement TECHNOLOGIST PROVIDED HISTORY: Reason for exam:->central line and OG placement FINDINGS: LINES/TUBES/OTHER: Endotracheal tube is noted with its tip approximately 5 cm from the ana. Enteric tube is noted coursing below the level of the diaphragm and within the stomach. Left IJ central venous catheter is noted with its tip projecting over the area of the left brachiocephalic vein. HEART/MEDIASTINUM: The cardiac silhouette is mildly enlarged, but stable. PLEURA/LUNGS: There is perihilar fullness and increased interstitial markings which may reflect pulmonary vascular congestion in the correct clinical setting.   There is left basilar reflecting airspace disease, atelectasis or pleural effusion. There is right basilar airspace disease. There is no appreciable pneumothorax. BONES/SOFT TISSUE: No acute abnormality. Endotracheal tube and enteric tube are in satisfactory position. The left IJ central venous catheter tip projects over the area of the left brachiocephalic vein. Right basilar airspace disease. Left basilar opacification reflecting airspace disease, atelectasis or pleural effusion. Conclusions      Summary   *Left ventricle - moderate concentric LVH, normal size and function with EF   of 55%   *Mitral valve - mild regurgitation   *Tricuspid valve - trivial regurgitation   *Bubble study - negative      Signature      ------------------------------------------------------------------   Electronically signed by Jordan Patel MD (Interpreting   physician) on 08/31/2020 at 02:48 PM   ------------------------------------------------------------------         Summary   Left ventricular systolic function is normal with ejection fraction   estimated at 55-60 %. No regional wall motion abnormalities are noted. There is mild left ventricular hypertrophy. Normal left ventricular diastolic filling pressure. Moderate mitral regurgitation. Mild pulmonic regurgitation present. IVC size is dilated (>2.1 cm) but collapses > 50% with respiration   consistent with elevated RA pressure (8 mmHg). **There is a small-moderate bilateral pleural effusion. **There is a small circumferential pericardial effusion noted.       Previous echo done 2020 - EF 55%      Signature      ------------------------------------------------------------------   Electronically signed by Yojana Brown MD   (Interpreting physician) on 08/30/2021 at 04:21 PM   ------------------------------------------------------------------

## 2021-08-31 NOTE — CARE COORDINATION
Discharge Planning Assessment  Readmission risk score 19%  RN discharge planner met with patient/ (and family member) to discuss reason for admission, current living situation, and potential needs at the time of discharge    Demographics/Insurance verified Yes    Current type of dwelling: ground level condo in [facility]    Patient from ECF/SW confirmed with:n/a    Living arrangements: with     Level of function/Support: usually cares for self, spouse reports patient is resistant to going to physician offices for her follow up management    PCP: Kasie Peter    Last Visit to PCP: early July    DME: walker used PRN    Active with any community resources/agencies/skilled home care: none    Medication compliance issues: uses the [address] on [address]    Financial issues that could impact healthcare:      Tentative discharge plan: patient presently intubated, is to get a tunneled dialysis catheter this date    Discussed and provided facilities of choice if transition to a skilled nursing facility is required at the time of discharge      Discussed with patient and/or family that on the day of discharge home tentative time of discharge will be between 10 AM and noon.     Transportation at the time of discharge: pending clinical course    RAHEEL MezaN, CCM, RN  [facility]  [phone]

## 2021-08-31 NOTE — PROGRESS NOTES
08/30/21 2120   Vent Patient Data   Plateau Pressure 22 [id number]   Static Compliance 34 mL/cmH2O   Dynamic Compliance 31 mL/cmH2O

## 2021-08-31 NOTE — PROGRESS NOTES
Consent obtained by  to place vas cath and for blood products in necessary this admission.       Electronically signed by Julius Cruz RN on 8/31/2021 at 12:13 PM

## 2021-08-31 NOTE — PLAN OF CARE
Problem: Cardiovascular  Goal: Hemodynamic stability  Outcome: Ongoing     Problem: Nutrition  Goal: Optimal nutrition therapy  Outcome: Ongoing     Problem: Discharge Planning:  Goal: Participates in care planning  Description: Participates in care planning  Outcome: Ongoing     Problem: Discharge Planning:  Goal: Discharged to appropriate level of care  Description: Discharged to appropriate level of care  Outcome: Ongoing     Problem: Airway Clearance - Ineffective:  Goal: Ability to maintain a clear airway will improve  Description: Ability to maintain a clear airway will improve  Outcome: Ongoing     Problem: Anxiety/Stress:  Goal: Level of anxiety will decrease  Description: Level of anxiety will decrease  Outcome: Ongoing     Problem: Aspiration:  Goal: Absence of aspiration  Description: Absence of aspiration  Outcome: Ongoing     Problem:  Bowel Function - Altered:  Goal: Bowel elimination is within specified parameters  Description: Bowel elimination is within specified parameters  Outcome: Ongoing     Problem: Cardiac Output - Decreased:  Goal: Hemodynamic stability will improve  Description: Hemodynamic stability will improve  Outcome: Ongoing     Problem: Fluid Volume - Imbalance:  Goal: Absence of imbalanced fluid volume signs and symptoms  Description: Absence of imbalanced fluid volume signs and symptoms  Outcome: Ongoing     Problem: Gas Exchange - Impaired:  Goal: Levels of oxygenation will improve  Description: Levels of oxygenation will improve  Outcome: Ongoing     Problem: Mental Status - Impaired:  Goal: Mental status will be restored to baseline  Description: Mental status will be restored to baseline  Outcome: Ongoing     Problem: Nutrition Deficit:  Goal: Ability to achieve adequate nutritional intake will improve  Description: Ability to achieve adequate nutritional intake will improve  Outcome: Ongoing     Problem: Pain:  Goal: Pain level will decrease  Description: Pain level will decrease  Outcome: Ongoing     Problem: Pain:  Goal: Recognizes and communicates pain  Description: Recognizes and communicates pain  Outcome: Ongoing     Problem: Pain:  Goal: Control of acute pain  Description: Control of acute pain  Outcome: Ongoing     Problem: Pain:  Goal: Control of chronic pain  Description: Control of chronic pain  Outcome: Ongoing     Problem: Serum Glucose Level - Abnormal:  Goal: Ability to maintain appropriate glucose levels will improve to within specified parameters  Description: Ability to maintain appropriate glucose levels will improve to within specified parameters  Outcome: Ongoing     Problem: Skin Integrity - Impaired:  Goal: Will show no infection signs and symptoms  Description: Will show no infection signs and symptoms  Outcome: Ongoing     Problem: Skin Integrity - Impaired:  Goal: Absence of new skin breakdown  Description: Absence of new skin breakdown  Outcome: Ongoing     Problem: Sleep Pattern Disturbance:  Goal: Appears well-rested  Description: Appears well-rested  Outcome: Ongoing     Problem: Tissue Perfusion, Altered:  Goal: Circulatory function within specified parameters  Description: Circulatory function within specified parameters  Outcome: Ongoing     Problem: Tissue Perfusion - Cardiopulmonary, Altered:  Goal: Absence of angina  Description: Absence of angina  Outcome: Ongoing     Problem: Tissue Perfusion - Cardiopulmonary, Altered:  Goal: Hemodynamic stability will improve  Description: Hemodynamic stability will improve  Outcome: Ongoing     Problem: Falls - Risk of:  Goal: Will remain free from falls  Description: Will remain free from falls  Outcome: Ongoing     Problem: Falls - Risk of:  Goal: Absence of physical injury  Description: Absence of physical injury  Outcome: Ongoing     Problem: Skin Integrity:  Goal: Will show no infection signs and symptoms  Description: Will show no infection signs and symptoms  Outcome: Ongoing     Problem: Skin Integrity:  Goal: Absence of new skin breakdown  Description: Absence of new skin breakdown  Outcome: Ongoing

## 2021-08-31 NOTE — PROGRESS NOTES
P Pulmonary and Critical Care  Progress note      Reason for Consult: Acute hypoxemic respiratory failure, pulmonary edema, acute on chronic kidney disease  Requesting Physician: Dr. Merlin Sly:   [address] [facility] / [facility]:                The patient is a 55 y.o. female with significant past medical history of:      Diagnosis Date    Blind in both eyes     Cerebral artery occlusion with cerebral infarction ([address]     CHF (congestive heart failure) ([address]     Chronic kidney disease     Depression     Diabetes mellitus out of control ([address]     Diabetes mellitus, type II ([address]     2005    Diabetic neuropathy associated with type 2 diabetes mellitus ([address]     Generalized headaches     Hypertension     Infertility     Insomnia     chronic vs lack of time spent to sleep    Migraine headache 11/09/2011    Mixed hyperlipidemia     Otitis media     h/o recurrent    Pelvic abscess in female 10/05/2013    Pneumonia     2004 approx.  St[address] [address] 08/27/2020     Interval history: Patient remains intubated on mechanical ventilation. On propofol 50 mcg. She is pretty sedated. .      Past Surgical History:        Procedure Laterality Date    CERVIX SURGERY      laser tx for dysplasia;1992    EYE SURGERY      FOOT SURGERY Right     FOOT SURGERY Bilateral     FOOT SURGERY Left      Current Medications:    Current Facility-Administered Medications: [Held by provider] heparin (porcine) injection 5,000 Units, 5,000 Units, SubCUTAneous, 3 times per day  fentaNYL 10 mcg/mL infusion, 12.5-200 mcg/hr, IntraVENous, Continuous  furosemide (LASIX) injection 40 mg, 40 mg, IntraVENous, TID  insulin lispro (1 Unit Dial) 0-6 Units, 0-6 Units, SubCUTAneous, Q4H  glucose (GLUTOSE) 40 % oral gel 15 g, 15 g, Oral, PRN  dextrose 50 % IV solution, 12.5 g, IntraVENous, PRN  glucagon (rDNA) injection 1 mg, 1 mg, IntraMUSCular, PRN  dextrose 5 % solution, 100 mL/hr, IntraVENous, PRN  pantoprazole (PROTONIX) injection 40 mg, 40 mg, IntraVENous, BID  propofol injection, 5-50 mcg/kg/min, IntraVENous, Titrated  fentaNYL (SUBLIMAZE) injection 50 mcg, 50 mcg, IntraVENous, Q1H PRN  sodium chloride flush 0.9 % injection 5-40 mL, 5-40 mL, IntraVENous, 2 times per day  sodium chloride flush 0.9 % injection 5-40 mL, 5-40 mL, IntraVENous, PRN  0.9 % sodium chloride infusion, 25 mL, IntraVENous, PRN  ondansetron (ZOFRAN-ODT) disintegrating tablet 4 mg, 4 mg, Oral, Q8H PRN **OR** ondansetron (ZOFRAN) injection 4 mg, 4 mg, IntraVENous, Q6H PRN  polyethylene glycol (GLYCOLAX) packet 17 g, 17 g, Oral, Daily PRN  acetaminophen (TYLENOL) tablet 650 mg, 650 mg, Oral, Q6H PRN **OR** acetaminophen (TYLENOL) suppository 650 mg, 650 mg, Rectal, Q6H PRN  hydrALAZINE (APRESOLINE) injection 10 mg, 10 mg, IntraVENous, Q4H PRN    Allergies   Allergen Reactions    Amoxicillin Itching and Hives     Tolerates cephalosporins  Patient tolerating cefazolin (ANCEF) as of October 11, 2018  Patient tolerating cefazolin (ANCEF) as of October 11, 2018  Tolerates cephalosporins  Patient tolerating cefazolin (ANCEF) as of October 11, 2018    Levofloxacin Anaphylaxis    Levofloxacin Anaphylaxis    Vancomycin Shortness Of Breath, Hives and Anaphylaxis    Tape [Adhesive Tape] Other (See Comments)     Paper tape turns skin bright red. Plastic tape okay. Social History:    TOBACCO:   reports that she has been smoking cigarettes. She has been smoking about 1.00 pack per day. She has never used smokeless tobacco.  ETOH:   reports no history of alcohol use.   Patient currently lives independently  Environmental/chemical exposure: None known    Family History:       Problem Relation Age of Onset    Diabetes Mother    Enma Cai Other Mother 79        Covid    Diabetes Father     High Blood Pressure Father     Colon Cancer Father     Diabetes Sister     Alcohol Abuse Maternal Grandfather     Diabetes Paternal Grandmother     Alcohol Abuse Paternal Grandfather     Diabetes Paternal Aunt     Diabetes Paternal Uncle      REVIEW OF SYSTEMS:    ROS is unobtainable due to his critical illness. Objective:   PHYSICAL EXAM:      VITALS:  BP (!) 148/63   Pulse 83   Temp 97 °F (36.1 °C) (Bladder)   Resp 16   Ht 5' 6\" (1.676 m)   Wt 222 lb 0.1 oz (100.7 kg)   SpO2 98%   BMI 35.83 kg/m²      24HR INTAKE/OUTPUT:      Intake/Output Summary (Last 24 hours) at 8/31/2021 1005  Last data filed at 8/31/2021 4356  Gross per 24 hour   Intake 1249.43 ml   Output 2935 ml   Net -1685.57 ml     CONSTITUTIONAL: Sedated on mechanical ventilation  NECK:  Supple, symmetrical, trachea midline, no adenopathy, thyroid symmetric, not enlarged and no tenderness, skin normal  LUNGS:  no increased work of breathing and crackles to auscultation. No accessory muscle use  CARDIOVASCULAR: S1 and S2, no edema and no JVD  ABDOMEN:  normal bowel sounds, non-distended and no masses palpated, and no tenderness to palpation. No hepatospleenomegaly  LYMPHADENOPATHY:  no axillary or supraclavicular adenopathy. No cervical adnenopathy  PSYCHIATRIC: Sedated on mechanical ventilation MUSCULOSKELETAL: No obvious misalignment or effusion of the joints. No clubbing, cyanosis of the digits. SKIN:  normal skin color, texture, turgor and no redness, warmth, or swelling. No palpable nodules    DATA:    Old records have been reviewed    CBC:  Recent Labs     08/29/21  0415 08/29/21  0415 08/29/21  1430 08/30/21  0425 08/31/21  0448   WBC 15.8*  --   --  16.6* 14.7*   RBC 2.49*  --   --  2.62* 2.43*   HGB 7.3*   < > 7.3* 7.6* 7.0*   HCT 22.1*   < > 22.6* 23.4* 21.4*     --   --  251 247   MCV 88.7  --   --  89.1 88.2   MCH 29.1  --   --  28.9 29.0   MCHC 32.8  --   --  32.4 32.9   RDW 15.9*  --   --  16.3* 16.3*    < > = values in this interval not displayed.       BMP:  Recent Labs     08/29/21  0415 08/29/21  0415 08/30/21  0425 08/30/21  1530 08/31/21  0448     --  136  --  140   K 3.5   < > 3.2* 3.4* 3.5    --  106  --  110   CO2 16*  --  14*  --  15*   BUN 43*  --  46*  --  49*   CREATININE 5.3*  --  5.5*  --  5.8*   CALCIUM 6.4*  --  7.5*  --  7.1*   GLUCOSE 114*  --  114*  --  90    < > = values in this interval not displayed. ABG:  Recent Labs     08/30/21  1055   PHART 7.309*   NCO2JMD 31.9*   PO2ART 92.7   XKS0RVJ 16.0*   A4MMGIVO 97.7   BEART -9.4*     Procalcitonin  Recent Labs     08/28/21  1435   PROCAL 0.86*       No results found for: BNP  Lab Results   Component Value Date    CKTOTAL 34 08/31/2021    TROPONINI 0.23 (H) 08/28/2021           Radiology Review:  All pertinent images / reports were reviewed as a part of this visit. Assessment:     1. Acute hypoxemic respiratory failure  2. Acute on chronic kidney disease  3. Acute pulmonary edema      Plan:     I have reviewed laboratories, medical records and images for this visit  Chest imaging is consistent with acute pulmonary edema  Chest imaging from today is pending  BNP is greater than 30,000 on admission  Creatinine has worsened and is now 5.8  She is diuresed a net of only 1.6 L for the entire hospitalization. She is significantly edematous. Edema seems to be worsening  She is receiving boluses of Lasix  Looks to me like she needs ultrafiltration for fluid removal.  Nephrology is following    She does have a leukocytosis and pro calcitonin is 0.86  She is growing staph epidermidis from her blood. This is likely contaminant  Otherwise cultures are negative. MRSA nasal screen is negative  Stop Zyvox  Give a total of 5 days cefepime  Repeat procalcitonin    Remains on AC/, respiratory rate 14 and FiO2 0.3 with PEEP 5  Chest x-ray for today is pending  She failed spontaneous breathing trial yesterday due to tachypnea and hypoxemia.   Chest x-ray from this morning is pending  Reluctant to try another spontaneous breathing trial at the moment  Start fentanyl and try to reduce the dose of propofol  Start tube feedings  Looks like she may be on the ventilator for several days yet    Patient did have echocardiogram showing good LV function and no significant diastolic dysfunction. Total critical care time caring for this patient with life threatening illness, including direct patient contact, management of life support systems, review of data including imaging and labs, discussions with other team members and physicians is at least 32 minutes so far today, excluding procedures.

## 2021-09-01 ENCOUNTER — APPOINTMENT (OUTPATIENT)
Dept: GENERAL RADIOLOGY | Age: 46
DRG: 720 | End: 2021-09-01
Payer: COMMERCIAL

## 2021-09-01 LAB
A/G RATIO: 0.7 (ref 1.1–2.2)
ALBUMIN SERPL-MCNC: 2.3 G/DL (ref 3.4–5)
ALP BLD-CCNC: 119 U/L (ref 40–129)
ALT SERPL-CCNC: <5 U/L (ref 10–40)
ANION GAP SERPL CALCULATED.3IONS-SCNC: 13 MMOL/L (ref 3–16)
APTT: 33.2 SEC (ref 26.2–38.6)
AST SERPL-CCNC: 9 U/L (ref 15–37)
BASOPHILS ABSOLUTE: 0.1 K/UL (ref 0–0.2)
BASOPHILS RELATIVE PERCENT: 0.5 %
BILIRUB SERPL-MCNC: <0.2 MG/DL (ref 0–1)
BUN BLDV-MCNC: 31 MG/DL (ref 7–20)
CALCIUM IONIZED: 1.01 MMOL/L (ref 1.12–1.32)
CALCIUM SERPL-MCNC: 7.7 MG/DL (ref 8.3–10.6)
CHLORIDE BLD-SCNC: 110 MMOL/L (ref 99–110)
CO2: 19 MMOL/L (ref 21–32)
CREAT SERPL-MCNC: 4 MG/DL (ref 0.6–1.1)
EOSINOPHILS ABSOLUTE: 0.9 K/UL (ref 0–0.6)
EOSINOPHILS RELATIVE PERCENT: 8 %
GFR AFRICAN AMERICAN: 15
GFR NON-AFRICAN AMERICAN: 12
GLOBULIN: 3.1 G/DL
GLUCOSE BLD-MCNC: 101 MG/DL (ref 70–99)
GLUCOSE BLD-MCNC: 103 MG/DL (ref 70–99)
GLUCOSE BLD-MCNC: 105 MG/DL (ref 70–99)
GLUCOSE BLD-MCNC: 90 MG/DL (ref 70–99)
GLUCOSE BLD-MCNC: 94 MG/DL (ref 70–99)
GLUCOSE BLD-MCNC: 99 MG/DL (ref 70–99)
HCT VFR BLD CALC: 22.5 % (ref 36–48)
HEMOGLOBIN: 7.4 G/DL (ref 12–16)
LACTIC ACID: 0.7 MMOL/L (ref 0.4–2)
LYMPHOCYTES ABSOLUTE: 1.4 K/UL (ref 1–5.1)
LYMPHOCYTES RELATIVE PERCENT: 11.8 %
MAGNESIUM: 1.9 MG/DL (ref 1.8–2.4)
MCH RBC QN AUTO: 28.9 PG (ref 26–34)
MCHC RBC AUTO-ENTMCNC: 33 G/DL (ref 31–36)
MCV RBC AUTO: 87.5 FL (ref 80–100)
MONOCYTES ABSOLUTE: 0.8 K/UL (ref 0–1.3)
MONOCYTES RELATIVE PERCENT: 6.5 %
NEUTROPHILS ABSOLUTE: 8.5 K/UL (ref 1.7–7.7)
NEUTROPHILS RELATIVE PERCENT: 73.2 %
PDW BLD-RTO: 16.2 % (ref 12.4–15.4)
PERFORMED ON: ABNORMAL
PERFORMED ON: ABNORMAL
PERFORMED ON: NORMAL
PH VENOUS: 7.41 (ref 7.35–7.45)
PHOSPHORUS: 4.4 MG/DL (ref 2.5–4.9)
PLATELET # BLD: 232 K/UL (ref 135–450)
PMV BLD AUTO: 8.9 FL (ref 5–10.5)
POTASSIUM SERPL-SCNC: 3.5 MMOL/L (ref 3.5–5.1)
PROCALCITONIN: 0.68 NG/ML (ref 0–0.15)
RBC # BLD: 2.57 M/UL (ref 4–5.2)
SODIUM BLD-SCNC: 142 MMOL/L (ref 136–145)
TOTAL CK: 31 U/L (ref 26–192)
TOTAL PROTEIN: 5.4 G/DL (ref 6.4–8.2)
WBC # BLD: 11.6 K/UL (ref 4–11)

## 2021-09-01 PROCEDURE — 85730 THROMBOPLASTIN TIME PARTIAL: CPT

## 2021-09-01 PROCEDURE — 2500000003 HC RX 250 WO HCPCS: Performed by: INTERNAL MEDICINE

## 2021-09-01 PROCEDURE — 84100 ASSAY OF PHOSPHORUS: CPT

## 2021-09-01 PROCEDURE — 6360000002 HC RX W HCPCS: Performed by: INTERNAL MEDICINE

## 2021-09-01 PROCEDURE — 90935 HEMODIALYSIS ONE EVALUATION: CPT

## 2021-09-01 PROCEDURE — 84145 PROCALCITONIN (PCT): CPT

## 2021-09-01 PROCEDURE — 85025 COMPLETE CBC W/AUTO DIFF WBC: CPT

## 2021-09-01 PROCEDURE — 2700000000 HC OXYGEN THERAPY PER DAY

## 2021-09-01 PROCEDURE — 83605 ASSAY OF LACTIC ACID: CPT

## 2021-09-01 PROCEDURE — 2580000003 HC RX 258: Performed by: INTERNAL MEDICINE

## 2021-09-01 PROCEDURE — 80053 COMPREHEN METABOLIC PANEL: CPT

## 2021-09-01 PROCEDURE — 2000000000 HC ICU R&B

## 2021-09-01 PROCEDURE — P9047 ALBUMIN (HUMAN), 25%, 50ML: HCPCS | Performed by: INTERNAL MEDICINE

## 2021-09-01 PROCEDURE — 99291 CRITICAL CARE FIRST HOUR: CPT | Performed by: INTERNAL MEDICINE

## 2021-09-01 PROCEDURE — 94003 VENT MGMT INPAT SUBQ DAY: CPT

## 2021-09-01 PROCEDURE — 82330 ASSAY OF CALCIUM: CPT

## 2021-09-01 PROCEDURE — 83735 ASSAY OF MAGNESIUM: CPT

## 2021-09-01 PROCEDURE — C9113 INJ PANTOPRAZOLE SODIUM, VIA: HCPCS | Performed by: INTERNAL MEDICINE

## 2021-09-01 PROCEDURE — 82550 ASSAY OF CK (CPK): CPT

## 2021-09-01 PROCEDURE — 6360000002 HC RX W HCPCS: Performed by: EMERGENCY MEDICINE

## 2021-09-01 PROCEDURE — 71045 X-RAY EXAM CHEST 1 VIEW: CPT

## 2021-09-01 PROCEDURE — 94761 N-INVAS EAR/PLS OXIMETRY MLT: CPT

## 2021-09-01 RX ORDER — DEXMEDETOMIDINE HYDROCHLORIDE 4 UG/ML
.2-1.4 INJECTION, SOLUTION INTRAVENOUS CONTINUOUS
Status: DISCONTINUED | OUTPATIENT
Start: 2021-09-01 | End: 2021-09-05

## 2021-09-01 RX ADMIN — DEXMEDETOMIDINE HYDROCHLORIDE 0.3 MCG/KG/HR: 4 INJECTION, SOLUTION INTRAVENOUS at 17:34

## 2021-09-01 RX ADMIN — Medication 10 ML: at 21:03

## 2021-09-01 RX ADMIN — ALBUMIN (HUMAN) 25 G: 0.25 INJECTION, SOLUTION INTRAVENOUS at 13:36

## 2021-09-01 RX ADMIN — Medication 10 ML: at 08:25

## 2021-09-01 RX ADMIN — Medication 45 MCG/HR: at 09:58

## 2021-09-01 RX ADMIN — PROPOFOL 30 MCG/KG/MIN: 10 INJECTION, EMULSION INTRAVENOUS at 04:51

## 2021-09-01 RX ADMIN — PANTOPRAZOLE SODIUM 40 MG: 40 INJECTION, POWDER, FOR SOLUTION INTRAVENOUS at 08:24

## 2021-09-01 RX ADMIN — FUROSEMIDE 40 MG: 10 INJECTION, SOLUTION INTRAMUSCULAR; INTRAVENOUS at 14:26

## 2021-09-01 RX ADMIN — Medication 45 MCG/HR: at 20:59

## 2021-09-01 RX ADMIN — DEXMEDETOMIDINE HYDROCHLORIDE 0.2 MCG/KG/HR: 4 INJECTION, SOLUTION INTRAVENOUS at 09:13

## 2021-09-01 RX ADMIN — HEPARIN SODIUM 5000 UNITS: 5000 INJECTION INTRAVENOUS; SUBCUTANEOUS at 14:26

## 2021-09-01 RX ADMIN — HEPARIN SODIUM 5000 UNITS: 5000 INJECTION INTRAVENOUS; SUBCUTANEOUS at 21:02

## 2021-09-01 RX ADMIN — FUROSEMIDE 40 MG: 10 INJECTION, SOLUTION INTRAMUSCULAR; INTRAVENOUS at 08:24

## 2021-09-01 RX ADMIN — FUROSEMIDE 40 MG: 10 INJECTION, SOLUTION INTRAMUSCULAR; INTRAVENOUS at 21:02

## 2021-09-01 RX ADMIN — PANTOPRAZOLE SODIUM 40 MG: 40 INJECTION, POWDER, FOR SOLUTION INTRAVENOUS at 21:02

## 2021-09-01 RX ADMIN — PROPOFOL 35 MCG/KG/MIN: 10 INJECTION, EMULSION INTRAVENOUS at 00:24

## 2021-09-01 RX ADMIN — PROPOFOL 20 MCG/KG/MIN: 10 INJECTION, EMULSION INTRAVENOUS at 10:32

## 2021-09-01 RX ADMIN — EPOETIN ALFA-EPBX 10000 UNITS: 10000 INJECTION, SOLUTION INTRAVENOUS; SUBCUTANEOUS at 15:17

## 2021-09-01 RX ADMIN — PROPOFOL 20 MCG/KG/MIN: 10 INJECTION, EMULSION INTRAVENOUS at 21:00

## 2021-09-01 ASSESSMENT — PAIN SCALES - GENERAL: PAINLEVEL_OUTOF10: 0

## 2021-09-01 ASSESSMENT — PULMONARY FUNCTION TESTS
PIF_VALUE: 26
PIF_VALUE: 25
PIF_VALUE: 25
PIF_VALUE: 30
PIF_VALUE: 26
PIF_VALUE: 26
PIF_VALUE: 28
PIF_VALUE: 26

## 2021-09-01 NOTE — PROGRESS NOTES
Treatment time: 2.5 hrs    Net UF: 1400    Pre weight: 97.2 kg  Post weight: 96.2 kg  EDW: TBD    Access used: R Neck IJ  Access function: Good    Medications or blood products given: Albumin, Retacrit    Regular outpatient schedule: TBD    Summary of response to treatment: Patient tolerated treatment fair. BP dropped in the beginning of treatment. Had to administer Albumin to stabilize. After med administration patient pressures were stable for the duration of treatment. Access ran well at ordered . Report given to primary RN. Patient VSS upon leaving bedside. Copy of dialysis treatment record placed in chart, to be scanned into EMR.

## 2021-09-01 NOTE — PROGRESS NOTES
08/31/21 2000   Vent Patient Data   Plateau Pressure 28 [id number]   Static Compliance 23 mL/cmH2O   Dynamic Compliance 29.8 mL/cmH2O

## 2021-09-01 NOTE — PROGRESS NOTES
P Pulmonary and Critical Care  Progress note      Reason for Consult: Acute hypoxemic respiratory failure, pulmonary edema, acute on chronic kidney disease  Requesting Physician: Dr. Paolo Devlin:   [address] [facility] / [facility]:                The patient is a 55 y.o. female with significant past medical history of:      Diagnosis Date    Blind in both eyes     Cerebral artery occlusion with cerebral infarction ([address]     CHF (congestive heart failure) ([address]     Chronic kidney disease     Depression     Diabetes mellitus out of control ([address]     Diabetes mellitus, type II ([address]     2005    Diabetic neuropathy associated with type 2 diabetes mellitus ([address]     Generalized headaches     Hypertension     Infertility     Insomnia     chronic vs lack of time spent to sleep    Migraine headache 11/09/2011    Mixed hyperlipidemia     Otitis media     h/o recurrent    Pelvic abscess in female 10/05/2013    Pneumonia     2004 approx.  Stroke ([facility] [address] 08/27/2020     Interval history: Patient remains on propofol and fentanyl. She is deeply sedated. Continues to require mechanical ventilation.       Past Surgical History:        Procedure Laterality Date    CERVIX SURGERY      laser tx for dysplasia;1992    EYE SURGERY      FOOT SURGERY Right     FOOT SURGERY Bilateral     FOOT SURGERY Left      Current Medications:    Current Facility-Administered Medications: dexmedetomidine (PRECEDEX) 400 mcg in sodium chloride 0.9 % 100 mL infusion, 0.2-1.4 mcg/kg/hr, IntraVENous, Continuous  heparin (porcine) injection 5,000 Units, 5,000 Units, SubCUTAneous, 3 times per day  fentaNYL 10 mcg/mL infusion, 12.5-200 mcg/hr, IntraVENous, Continuous  albumin human 25 % IV solution 25 g, 25 g, IntraVENous, PRN  epoetin yohana-epbx (RETACRIT) injection 10,000 Units, 10,000 Units, SubCUTAneous, Every Other Day  heparin (porcine) injection 2,400 Units, 2,400 Units, IntraCATHeter, PRN  furosemide (LASIX) injection 40 mg, 40 mg, IntraVENous, TID  insulin lispro (1 Unit Dial) 0-6 Units, 0-6 Units, SubCUTAneous, Q4H  glucose (GLUTOSE) 40 % oral gel 15 g, 15 g, Oral, PRN  dextrose 50 % IV solution, 12.5 g, IntraVENous, PRN  glucagon (rDNA) injection 1 mg, 1 mg, IntraMUSCular, PRN  dextrose 5 % solution, 100 mL/hr, IntraVENous, PRN  pantoprazole (PROTONIX) injection 40 mg, 40 mg, IntraVENous, BID  propofol injection, 5-50 mcg/kg/min, IntraVENous, Titrated  fentaNYL (SUBLIMAZE) injection 50 mcg, 50 mcg, IntraVENous, Q1H PRN  sodium chloride flush 0.9 % injection 5-40 mL, 5-40 mL, IntraVENous, 2 times per day  sodium chloride flush 0.9 % injection 5-40 mL, 5-40 mL, IntraVENous, PRN  0.9 % sodium chloride infusion, 25 mL, IntraVENous, PRN  ondansetron (ZOFRAN-ODT) disintegrating tablet 4 mg, 4 mg, Oral, Q8H PRN **OR** ondansetron (ZOFRAN) injection 4 mg, 4 mg, IntraVENous, Q6H PRN  polyethylene glycol (GLYCOLAX) packet 17 g, 17 g, Oral, Daily PRN  acetaminophen (TYLENOL) tablet 650 mg, 650 mg, Oral, Q6H PRN **OR** acetaminophen (TYLENOL) suppository 650 mg, 650 mg, Rectal, Q6H PRN  hydrALAZINE (APRESOLINE) injection 10 mg, 10 mg, IntraVENous, Q4H PRN    Allergies   Allergen Reactions    Amoxicillin Itching and Hives     Tolerates cephalosporins  Patient tolerating cefazolin (ANCEF) as of October 11, 2018  Patient tolerating cefazolin (ANCEF) as of October 11, 2018  Tolerates cephalosporins  Patient tolerating cefazolin (ANCEF) as of October 11, 2018    Levofloxacin Anaphylaxis    Levofloxacin Anaphylaxis    Vancomycin Shortness Of Breath, Hives and Anaphylaxis    Tape [Adhesive Tape] Other (See Comments)     Paper tape turns skin bright red. Plastic tape okay. Social History:    TOBACCO:   reports that she has been smoking cigarettes. She has been smoking about 1.00 pack per day. She has never used smokeless tobacco.  ETOH:   reports no history of alcohol use.   Patient currently lives independently  Environmental/chemical exposure: None known    Family History:       Problem Relation Age of Onset    Diabetes Mother    [employer] Other Mother 79        Covid    Diabetes Father     High Blood Pressure Father     Colon Cancer Father     Diabetes Sister     Alcohol Abuse Maternal Grandfather     Diabetes Paternal Grandmother     Alcohol Abuse Paternal Grandfather     Diabetes Paternal Aunt     Diabetes Paternal Uncle      REVIEW OF SYSTEMS:    AMIE is unobtainable due to his critical illness. Objective:   PHYSICAL EXAM:      VITALS:  BP (!) 136/58   Pulse 82   Temp 97.2 °F (36.2 °C) (Temporal)   Resp 16   Ht 5' 6\" (1.676 m)   Wt 214 lb 4.6 oz (97.2 kg)   SpO2 99%   BMI 34.59 kg/m²      24HR INTAKE/OUTPUT:      Intake/Output Summary (Last 24 hours) at 9/1/2021 1048  Last data filed at 9/1/2021 1032  Gross per 24 hour   Intake 1569.96 ml   Output 4872 ml   Net -3302.04 ml     CONSTITUTIONAL: Sedated on mechanical ventilation  NECK:  Supple, symmetrical, trachea midline, no adenopathy, thyroid symmetric, not enlarged and no tenderness, skin normal  LUNGS:  no increased work of breathing and crackles to auscultation. No accessory muscle use  CARDIOVASCULAR: S1 and S2, no edema and no JVD  ABDOMEN:  normal bowel sounds, non-distended and no masses palpated, and no tenderness to palpation. No hepatospleenomegaly  LYMPHADENOPATHY:  no axillary or supraclavicular adenopathy. No cervical adnenopathy  PSYCHIATRIC: Sedated on mechanical ventilation MUSCULOSKELETAL: No obvious misalignment or effusion of the joints. No clubbing, cyanosis of the digits. SKIN:  normal skin color, texture, turgor and no redness, warmth, or swelling.  No palpable nodules    DATA:    Old records have been reviewed    CBC:  Recent Labs     08/30/21  0425 08/31/21  0448 09/01/21  0406   WBC 16.6* 14.7* 11.6*   RBC 2.62* 2.43* 2.57*   HGB 7.6* 7.0* 7.4*   HCT 23.4* 21.4* 22.5*    247 232   MCV 89.1 88.2 87.5   MCH 28.9 29.0 28.9   MCHC 32.4 32.9 33.0   RDW 16.3* once she is finished with dialysis. Continue tube feedings for now    Patient did have echocardiogram showing good LV function and no significant diastolic dysfunction. Total critical care time caring for this patient with life threatening illness, including direct patient contact, management of life support systems, review of data including imaging and labs, discussions with other team members and physicians is at least 32 minutes so far today, excluding procedures.

## 2021-09-01 NOTE — PROGRESS NOTES
MD Young Garcia MD Gaylyn Mourning, MD               Office: [phone]                      Fax: [phone]             [facility]                   NEPHROLOGY CVU INPATIENT PROGRESS NOTE:     PATIENT NAME: Will Benavides  : 1975  MRN: [id number]       RECOMMENDATIONS:     -appreciated assistance by Dr Chelle De La Torre for Rt IJ non-TDC placement ()   -Hemodialysis #1 , tolerated well  - HD #2 today, incremental flows, more UF ~2-3L if tolerated  - Needs a long term access, eventually prefered [facility] w/ h/o advanced CKD, likely will need long-term      - continue Lasix too- Rx: IV lasix  40- TID   -Monitor acidosis,  -Hold home dose of aldactone, lisinopril,  -echo results -  IVC size is dilated (>2.1 cm) but collapses > 50% with respiration consistent with elevated RA pressure (8 mmHg). -Last echo-2020  moderate concentric LVH, normal size and function with EF   of 15%, normal diastolic function    -PNA Rx w/ iv abx    -BP is improving w/ hydralazine, use PRN for >150,  -vent mx per [facility]  - at higher risk for decompensation, needing closer monitoring. D/C plan from renal stand point:  - unclear, as critically ill     Discussed with pt's  today   Discussed with patient's treatment team-  CVU nurse, hospitalist-Dr. Nancy Roy        IMPRESSION:       Admitted for:  Acute respiratory failure ([facility] [address] [J96.00]  Encephalopathy [G93.40]  Respiratory failure with hypoxia, unspecified chronicity ([address] [J96.91]  Pneumonia due to infectious organism, unspecified laterality, unspecified part of lung [J18.9]      KINZA (on proteinuric CKD: 4):  Worsening  - BL Scr-last known 2.5, in 2021,   ---> 4.6 on admission  -: Etiology of KINZA -presumed ATN in the setting of pneumonia, unclear if it this is advancing CKD    History of nephrotic range proteinuria,   - thought to be from diabetic nephropathy With CKD stage III -> so high risk of advanced CKD,  -Follows with Dr. Tanja Arevalo  -Noncompliance, last follow-up was 2/2021 then lost for follow-up    Associated problems:   Hypokalemia-needing repletion  Acidosis, non-anion gap-  Hypomagnesemia-  Hypophosphatemia  Anemia, chronic disease-worsening        Other major problems: Management per primary and other consulting teams.   //Acute hypoxic respiratory failure -needing intubation  // Multifocal airspace disease that likely represents a combination of aspiration/pneumonia and pulmonary edema  //Fluid overload  -COVID was ruled out  //History of uncontrolled diabetes last A1c was 11.3    // Hypertension un-controlled,       Hospital Problems         Last Modified POA    Pulmonary edema 8/28/2021 Yes    Chronic kidney disease (CKD), stage III (moderate) (HCC) 8/28/2021 Yes    Chronic diastolic (congestive) heart failure ([address] 8/28/2021 Yes    Acute on chronic diastolic heart failure ([address] 8/28/2021 Yes    Acute respiratory failure ([address] 8/27/2021 Yes        : other supportive care :   - Check daily renal function panel with electrolytes-phosphorus  - Strict monitoring of I/Os, daily weight  - Renal feeds/diet  - Current medications reviewed. - Nephrotoxic medications have been discontinued. - Dose adjusted and appropriate. - Dose meds for eGFR <15 mL/min/1.73m2 during KINZA    - Avoid heavy opioids due to renal failure - may use very low dose dilaudid / fentanyl with close monitoring of CNS and respiratory depression. Please refer to the orders. High Complexity. Multiple complex problems. Discussed with patient 's treatment team- ICU team, nurse     Thank you for allowing me to participate in this patient's care. Please do not hesitate to contact me anytime. We will follow along with you.        Leonora Masterson MD,  [facility] [address] [place]: [phone] or [employer]  Fax: [phone]     Severally ill, at risk of impending organ failure needing ICU higher level of care/monitoring. Time spent  35 minutes that included face-to-face meeting/discussion with patient's treatment team (including primary/referring team and other consultants; included coordination of care with the treatment team; and review of patient's electronic medical records and ordering appropriates tests.         ========================================================   ========================================================   Subjective:   Patient currently still critically ill, intubated sedated,   HD started yesterday - tolerated well      Unable to get extubated  Past medical, Surgical, Social, Family medical history reviewed by me. MEDICATIONS: reviewed by me. Medications Prior to Admission:  No current facility-administered medications on file prior to encounter.      Current Outpatient Medications on File Prior to Encounter   Medication Sig Dispense Refill    Dulaglutide 0.75 MG/0.5ML SOPN Inject 0.75 mg into the skin once a week 4 pen 1    ibuprofen (ADVIL;MOTRIN) 200 MG CAPS Take 1 capsule by mouth      furosemide (LASIX) 80 MG tablet Take 80 mg by mouth every morning 80mg in the morning 40mg in the evening      amLODIPine (NORVASC) 10 MG tablet Take 1 tablet by mouth daily 30 tablet 1    furosemide (LASIX) 40 MG tablet Take 1 tablet by mouth every evening 30 tablet 1    spironolactone (ALDACTONE) 50 MG tablet Take 1 tablet by mouth daily 30 tablet 2    insulin glargine (LANTUS;BASAGLAR) 100 UNIT/ML injection pen Inject 30 Units into the skin daily 4 pen 2    lisinopril (PRINIVIL;ZESTRIL) 40 MG tablet Take 1 tablet by mouth daily 30 tablet 2    atorvastatin (LIPITOR) 40 MG tablet Take 1 tablet by mouth nightly 30 tablet 3    Insulin Pen Needle 29G X 12.7MM MISC 1 each by Does not apply route daily 100 each 5    propranolol (INDERAL LA) 80 MG extended release capsule Take 1 capsule by mouth every morning 30 capsule 2    LORazepam (ATIVAN) 0.5 MG tablet Take 0.5 mg by mouth 2 times daily as needed for Anxiety.  aspirin 81 MG EC tablet Take 1 tablet by mouth daily 30 tablet 3    pregabalin (LYRICA) 75 MG capsule Take 1 capsule by mouth nightly for 7 days. 7 capsule 0    sertraline (ZOLOFT) 50 MG tablet Take 1 tablet by mouth daily 30 tablet 3    glucose monitoring kit (FREESTYLE) monitoring kit 1 kit by Does not apply route daily 1 kit 0    insulin lispro, 1 Unit Dial, 100 UNIT/ML SOPN Inject 0-6 Units into the skin 3 times daily (with meals) **Corrective Low Dose Algorithm**  Glucose: Dose:               No Insulin  140-199 1 Unit  200-249 2 Units  250-299 3 Units  300-349 4 Units  350-399 5 Units  Over 399 6 Units (Patient taking differently: Inject 6-12 Units into the skin 3 times daily (with meals) **Corrective Low Dose Algorithm**  Glucose: Dose:               No Insulin  140-199 1 Unit  200-249 2 Units  250-299 3 Units  300-349 4 Units  350-399 5 Units  Over 399 6 Units) 3 pen 0    glucose blood VI test strips (VICTORY AGM-4000 TEST) strip Test four times daily until controlled and then three times daily.   Code 250.02  ONE TOUCH ULTRA MONITOR 100 strip 12         Current Facility-Administered Medications:     dexmedetomidine (PRECEDEX) 400 mcg in sodium chloride 0.9 % 100 mL infusion, 0.2-1.4 mcg/kg/hr, IntraVENous, Continuous, Shyam Valencia MD, Last Rate: 4.9 mL/hr at 09/01/21 0913, 0.2 mcg/kg/hr at 09/01/21 0913    heparin (porcine) injection 5,000 Units, 5,000 Units, SubCUTAneous, 3 times per day, Shyam Valencia MD    fentaNYL 10 mcg/mL infusion, 12.5-200 mcg/hr, IntraVENous, Continuous, Shyam Valencia MD, Last Rate: 4.5 mL/hr at 09/01/21 0958, 45 mcg/hr at 09/01/21 0958    albumin human 25 % IV solution 25 g, 25 g, IntraVENous, PRN, Torey Dillard MD, Last Rate: 100 mL/hr at 08/31/21 1831, Rate Verify at 08/31/21 1831    epoetin yohana-epbx (RETACRIT) injection 10,000 Units, 10,000 Units, SubCUTAneous, Every Other Day, Torey Dillard MD, 10,000 Units at 08/31/21 1856    heparin (porcine) injection 2,400 Units, 2,400 Units, IntraCATHeter, PRN, Clair Tinajero MD    furosemide (LASIX) injection 40 mg, 40 mg, IntraVENous, TID, Clair Tinajero MD, 40 mg at 09/01/21 0824    insulin lispro (1 Unit Dial) 0-6 Units, 0-6 Units, SubCUTAneous, Q4H, Brittany Dejesus DO    glucose (GLUTOSE) 40 % oral gel 15 g, 15 g, Oral, PRN, Brittany Dejesus,     dextrose 50 % IV solution, 12.5 g, IntraVENous, PRN, Brittany Dejesus,     glucagon (rDNA) injection 1 mg, 1 mg, IntraMUSCular, PRN, Brittany Dejesus,     dextrose 5 % solution, 100 mL/hr, IntraVENous, PRN, Brittany Dejesus, DO    pantoprazole (PROTONIX) injection 40 mg, 40 mg, IntraVENous, BID, Scott Murray MD, 40 mg at 09/01/21 6698    propofol injection, 5-50 mcg/kg/min, IntraVENous, Titrated, Kingsley Olmos MD, Last Rate: 20 mL/hr at 09/01/21 0600, 30 mcg/kg/min at 09/01/21 0600    fentaNYL (SUBLIMAZE) injection 50 mcg, 50 mcg, IntraVENous, Q1H PRN, Kingsley Olmos MD    sodium chloride flush 0.9 % injection 5-40 mL, 5-40 mL, IntraVENous, 2 times per day, Brittany Dejesus DO, 10 mL at 09/01/21 0825    sodium chloride flush 0.9 % injection 5-40 mL, 5-40 mL, IntraVENous, PRN, Brittany Dejesus DO, 10 mL at 08/31/21 1532    0.9 % sodium chloride infusion, 25 mL, IntraVENous, PRN, Ashwin Dejesus DO, Stopped at 08/29/21 2301    ondansetron (ZOFRAN-ODT) disintegrating tablet 4 mg, 4 mg, Oral, Q8H PRN **OR** ondansetron (ZOFRAN) injection 4 mg, 4 mg, IntraVENous, Q6H PRN, Brittany Dejesus DO    polyethylene glycol (GLYCOLAX) packet 17 g, 17 g, Oral, Daily PRN, June Chiara Dejesus DO    acetaminophen (TYLENOL) tablet 650 mg, 650 mg, Oral, Q6H PRN **OR** acetaminophen (TYLENOL) suppository 650 mg, 650 mg, Rectal, Q6H PRN, June Chiara Dejesus DO    hydrALAZINE (APRESOLINE) injection 10 mg, 10 mg, IntraVENous, Q4H PRN, Brittany Dejesus DO, 10 mg at 08/28/21 1121      REVIEW OF SYSTEMS:     Review of systems not obtained due to patient factors - intubation       PHYSICAL EXAM:  Recent vital signs and recent I/Os reviewed by me.      Wt Readings from Last 3 Encounters:   09/01/21 214 lb 4.6 oz (97.2 kg)   07/08/21 244 lb 11.2 oz (111 kg)   02/26/21 237 lb 3.2 oz (107.6 kg)     BP Readings from Last 3 Encounters:   09/01/21 (!) 145/55   07/08/21 (!) 160/100   02/26/21 133/86     Patient Vitals for the past 24 hrs:   BP Temp Temp src Pulse Resp SpO2 Weight   09/01/21 0800 (!) 145/55 97.2 °F (36.2 °C) Temporal 84 16 98 % --   09/01/21 0600 (!) 137/53 -- -- 82 16 99 % --   09/01/21 0500 (!) 141/53 -- -- 80 16 99 % --   09/01/21 0400 (!) 137/52 97.2 °F (36.2 °C) Bladder 83 16 99 % 214 lb 4.6 oz (97.2 kg)   09/01/21 0326 -- -- -- 77 16 99 % --   09/01/21 0300 (!) 150/60 -- -- 76 16 99 % --   09/01/21 0200 (!) 137/56 -- -- 74 16 99 % --   09/01/21 0100 (!) 147/61 -- -- 76 16 99 % --   09/01/21 0022 -- -- -- 75 16 100 % --   09/01/21 0000 (!) 147/54 96.8 °F (36 °C) Bladder 78 16 100 % --   08/31/21 2000 (!) 138/54 96.9 °F (36.1 °C) Bladder 87 15 100 % --   08/31/21 1933 (!) 121/46 96.8 °F (36 °C) -- 89 16 99 % 212 lb 1.3 oz (96.2 kg)   08/31/21 1653 (!) 155/59 97 °F (36.1 °C) -- 84 19 99 % 216 lb 4.3 oz (98.1 kg)   08/31/21 1600 (!) 143/59 -- -- 80 16 100 % --   08/31/21 1237 -- -- -- -- 18 -- --   08/31/21 1100 (!) 157/62 97.6 °F (36.4 °C) Bladder 89 16 97 % --       Intake/Output Summary (Last 24 hours) at 9/1/2021 1005  Last data filed at 9/1/2021 0600  Gross per 24 hour   Intake 1308.09 ml   Output 4295 ml   Net -2986.91 ml          Constitutional: Poorly responsive, Intubated and  obese habitus  Eyes: Conjunctiva clear and Noscleral icterus  Ear, Nose, and Throat: moist oral mucosa; ET to vent  Neck: Trachea midline,  No jugular venous distension  Cardiovascular: Regular rate and ryhthm, normal S1 and S2,    Respiratory: Mechanically ventilated; Lung sounds notable for synchronous vent-associated breath sounds  Abdomen: Troponin:    Lab Results   Component Value Date    TROPONINI 0.23 08/28/2021    TROPONINI 0.22 08/27/2021    TROPONINI 0.24 08/27/2021       U/A:    Lab Results   Component Value Date    COLORU YELLOW 08/27/2021    PROTEINU >300 08/27/2021    PHUR 6.0 08/28/2021    WBCUA 7 08/27/2021    RBCUA 3 08/27/2021    CLARITYU Clear 08/27/2021    SPECGRAV 1.013 08/27/2021    LEUKOCYTESUR Negative 08/27/2021    UROBILINOGEN 0.2 08/27/2021    BILIRUBINUR Negative 08/27/2021    BLOODU SMALL 08/27/2021    GLUCOSEU 250 08/27/2021     Microalbumen/Creatinine ratio:  No components found for: RUCREAT  24 Hour Urine for Protein:  No components found for: RAWUPRO, UHRS3, VJMM75RL, UTV3  24 Hour Urine for Creatinine Clearance:  No components found for: CREAT4, UHRS10, UTV10  Urine Toxicology:  No components found for: Moises Sheller, IBENZO, ICOCAINE, IMARTHC, IOPIATES, IPHENCYC    HgBA1c:    Lab Results   Component Value Date    LABA1C 5.7 08/27/2021     RPR:  No results found for: RPR  HIV:  No results found for: HIV  NILSA:    Lab Results   Component Value Date    NILSA Negative 04/25/2020     RF:  No results found for: RF  DSDNA:  No components found for: DNA  AMYLASE:  No results found for: AMYLASE  LIPASE:    Lab Results   Component Value Date    LIPASE 14.0 04/25/2020     Fibrinogen Level:  No components found for: FIB       BELOW MENTIONED RADIOLOGY STUDY RESULTS BY ME (AS NEEDED FOR MY EVALUATION AND MANAGEMENT).      CT HEAD WO CONTRAST    Result Date: 8/27/2021  EXAMINATION: CT OF THE HEAD WITHOUT CONTRAST; CT OF THE CHEST WITHOUT CONTRAST  8/27/2021 7:12 pm TECHNIQUE: CT of the head was performed without the administration of intravenous contrast. Dose modulation, iterative reconstruction, and/or weight based adjustment of the mA/kV was utilized to reduce the radiation dose to as low as reasonably achievable.; CT of the chest was performed without the administration of intravenous contrast. Multiplanar reformatted images are provided for review. Dose modulation, iterative reconstruction, and/or weight based adjustment of the mA/kV was utilized to reduce the radiation dose to as low as reasonably achievable. COMPARISON: CT head 08/27/2020. HISTORY: ORDERING SYSTEM PROVIDED HISTORY: AMS TECHNOLOGIST PROVIDED HISTORY: Has a \"code stroke\" or \"stroke alert\" been called? ->No Reason for exam:->AMS Decision Support Exception - unselect if not a suspected or confirmed emergency medical condition->Emergency Medical Condition (MA) Is the patient pregnant?->No Reason for Exam: Respiratory Distress (Pt brought in per [facility] after call from  for resp distress. O2 sat 70's, BMV in route. Pt will open eyes to voice. Pupils 8mm and fixed. ) Acuity: Acute Type of Exam: Initial; ORDERING SYSTEM PROVIDED HISTORY: AMS, respiratory distress TECHNOLOGIST PROVIDED HISTORY: Reason for exam:->AMS, respiratory distress Is the patient pregnant?->No Reason for Exam: Respiratory Distress (Pt brought in per [facility] after call from  for resp distress. O2 sat 70's, BMV in route. Pt will open eyes to voice. Pupils 8mm and fixed. ) Acuity: Acute Type of Exam: Initial CT HEAD FINDINGS: BRAIN/VENTRICLES: No acute hemorrhage. Periventricular and subcortical hypoattenuation is nonspecific. Interval chronic lacunar infarcts in the left corona radiata, thalamus, and internal capsule. Artifact partially obscures the laureano. Artifact partially obscures the inferior cerebellum. John Winchester white differentiation appears maintained given artifact near the skull base and through the posterior fossa. Mild prominence of the ventricles noted. Overall ventricle size appears increased from prior exam.  There is no midline shift. Basal cisterns appear patent. ORBITS: Visualized orbits appear unremarkable on this unenhanced exam. SINUSES: Mild mucosal thickening of the ethmoid and sphenoid sinuses. Visualized mastoid air cells appear clear.  SOFT TISSUES/SKULL: No depressed calvarial fracture. Fluid in the nasopharynx and oropharynx. CT HEAD IMPRESSION: No acute hemorrhage or midline shift. Increased ventricle size compared to prior exam.  Correlate with presentation to exclude acute hydrocephalus. Other findings as described. CT CHEST FINDINGS: Mediastinum: Heart is borderline enlarged. Trace pericardial effusion or thickening. Aorta is within normal limits in size. Pulmonary arteries are within normal limits in size. . Lungs/pleura: Central airways are patent. Endotracheal tube with tip terminating in the distal 3rd subglottic trachea. Secretions noted in the trachea. Opacification of segmental and subsegmental bronchi. Ground-glass the confluent airspace disease. Interlobular septal thickening. Small to moderate pleural effusions. No pneumothorax. Soft Tissues/Bones: Suboptimal evaluation for adenopathy without intravenous contrast. Mediastinal adenopathy. Diffuse body wall edema. Scattered degenerative changes noted in the visualized spine without spondylolisthesis. Upper Abdomen: Limited images of the upper abdomen are unremarkable given lack of intravenous contrast. CT CHEST IMPRESSION: 1.   1. Multifocal airspace disease that likely represents a combination of aspiration/pneumonia and pulmonary edema. 2. Other findings as described. CT CHEST WO CONTRAST    Result Date: 8/27/2021  EXAMINATION: CT OF THE HEAD WITHOUT CONTRAST; CT OF THE CHEST WITHOUT CONTRAST  8/27/2021 7:12 pm TECHNIQUE: CT of the head was performed without the administration of intravenous contrast. Dose modulation, iterative reconstruction, and/or weight based adjustment of the mA/kV was utilized to reduce the radiation dose to as low as reasonably achievable.; CT of the chest was performed without the administration of intravenous contrast. Multiplanar reformatted images are provided for review.  Dose modulation, iterative reconstruction, and/or weight based adjustment of the mA/kV was utilized to reduce the radiation dose to as low as reasonably achievable. COMPARISON: CT head 08/27/2020. HISTORY: ORDERING SYSTEM PROVIDED HISTORY: AMS TECHNOLOGIST PROVIDED HISTORY: Has a \"code stroke\" or \"stroke alert\" been called? ->No Reason for exam:->AMS Decision Support Exception - unselect if not a suspected or confirmed emergency medical condition->Emergency Medical Condition (MA) Is the patient pregnant?->No Reason for Exam: Respiratory Distress (Pt brought in per [facility] after call from  for resp distress. O2 sat 70's, BMV in route. Pt will open eyes to voice. Pupils 8mm and fixed. ) Acuity: Acute Type of Exam: Initial; ORDERING SYSTEM PROVIDED HISTORY: AMS, respiratory distress TECHNOLOGIST PROVIDED HISTORY: Reason for exam:->AMS, respiratory distress Is the patient pregnant?->No Reason for Exam: Respiratory Distress (Pt brought in per [facility] after call from  for resp distress. O2 sat 70's, BMV in route. Pt will open eyes to voice. Pupils 8mm and fixed. ) Acuity: Acute Type of Exam: Initial CT HEAD FINDINGS: BRAIN/VENTRICLES: No acute hemorrhage. Periventricular and subcortical hypoattenuation is nonspecific. Interval chronic lacunar infarcts in the left corona radiata, thalamus, and internal capsule. Artifact partially obscures the laureano. Artifact partially obscures the inferior cerebellum. Paul Schneiders white differentiation appears maintained given artifact near the skull base and through the posterior fossa. Mild prominence of the ventricles noted. Overall ventricle size appears increased from prior exam.  There is no midline shift. Basal cisterns appear patent. ORBITS: Visualized orbits appear unremarkable on this unenhanced exam. SINUSES: Mild mucosal thickening of the ethmoid and sphenoid sinuses. Visualized mastoid air cells appear clear. SOFT TISSUES/SKULL: No depressed calvarial fracture. Fluid in the nasopharynx and oropharynx.  CT HEAD IMPRESSION: No acute hemorrhage or midline shift. Increased ventricle size compared to prior exam.  Correlate with presentation to exclude acute hydrocephalus. Other findings as described. CT CHEST FINDINGS: Mediastinum: Heart is borderline enlarged. Trace pericardial effusion or thickening. Aorta is within normal limits in size. Pulmonary arteries are within normal limits in size. . Lungs/pleura: Central airways are patent. Endotracheal tube with tip terminating in the distal 3rd subglottic trachea. Secretions noted in the trachea. Opacification of segmental and subsegmental bronchi. Ground-glass the confluent airspace disease. Interlobular septal thickening. Small to moderate pleural effusions. No pneumothorax. Soft Tissues/Bones: Suboptimal evaluation for adenopathy without intravenous contrast. Mediastinal adenopathy. Diffuse body wall edema. Scattered degenerative changes noted in the visualized spine without spondylolisthesis. Upper Abdomen: Limited images of the upper abdomen are unremarkable given lack of intravenous contrast. CT CHEST IMPRESSION: 1.   1. Multifocal airspace disease that likely represents a combination of aspiration/pneumonia and pulmonary edema. 2. Other findings as described. XR CHEST PORTABLE    Result Date: 8/28/2021  EXAMINATION: ONE XRAY VIEW OF THE CHEST 8/28/2021 1:21 am COMPARISON: Chest x-ray dated 02/24/2021. Jose Confer HISTORY: ORDERING SYSTEM PROVIDED HISTORY: central line and OG placement TECHNOLOGIST PROVIDED HISTORY: Reason for exam:->central line and OG placement FINDINGS: LINES/TUBES/OTHER: Endotracheal tube is noted with its tip approximately 5 cm from the ana. Enteric tube is noted coursing below the level of the diaphragm and within the stomach. Left IJ central venous catheter is noted with its tip projecting over the area of the left brachiocephalic vein. HEART/MEDIASTINUM: The cardiac silhouette is mildly enlarged, but stable.  PLEURA/LUNGS: There is perihilar fullness and increased interstitial markings which may reflect pulmonary vascular congestion in the correct clinical setting. There is left basilar reflecting airspace disease, atelectasis or pleural effusion. There is right basilar airspace disease. There is no appreciable pneumothorax. BONES/SOFT TISSUE: No acute abnormality. Endotracheal tube and enteric tube are in satisfactory position. The left IJ central venous catheter tip projects over the area of the left brachiocephalic vein. Right basilar airspace disease. Left basilar opacification reflecting airspace disease, atelectasis or pleural effusion. Conclusions      Summary   *Left ventricle - moderate concentric LVH, normal size and function with EF   of 55%   *Mitral valve - mild regurgitation   *Tricuspid valve - trivial regurgitation   *Bubble study - negative      Signature      ------------------------------------------------------------------   Electronically signed by Mcneil Klinefelter, MD (Interpreting   physician) on 08/31/2020 at 02:48 PM   ------------------------------------------------------------------         Summary   Left ventricular systolic function is normal with ejection fraction   estimated at 55-60 %. No regional wall motion abnormalities are noted. There is mild left ventricular hypertrophy. Normal left ventricular diastolic filling pressure. Moderate mitral regurgitation. Mild pulmonic regurgitation present. IVC size is dilated (>2.1 cm) but collapses > 50% with respiration   consistent with elevated RA pressure (8 mmHg). **There is a small-moderate bilateral pleural effusion. **There is a small circumferential pericardial effusion noted.       Previous echo done 2020 - EF 55%      Signature      ------------------------------------------------------------------   Electronically signed by Gilford Hoover MD, Marjory Reding   (Interpreting physician) on 08/30/2021 at 04:21 PM ------------------------------------------------------------------

## 2021-09-01 NOTE — PROGRESS NOTES
Patients chart was reviewed post  Temporary HD Catheter placement procedure. No complications were noted post procedure.

## 2021-09-01 NOTE — PROGRESS NOTES
Multiple spontaneous breath trials attempted. Episodes of apnea resulted. Pt returned to AC/VC setting. Propofol is off at this time. Will continue to attempt spontaneous breathing trials.

## 2021-09-01 NOTE — PLAN OF CARE
Problem: Cardiovascular  Goal: Hemodynamic stability  9/1/2021 0919 by Christian Ellington RN  Outcome: Ongoing     Problem: Nutrition  Goal: Optimal nutrition therapy  9/1/2021 0919 by Christian Ellington RN  Outcome: Ongoing     Problem: Discharge Planning:  Goal: Participates in care planning  Description: Participates in care planning  9/1/2021 0919 by Christian Ellington RN  Outcome: Ongoing     Problem: Discharge Planning:  Goal: Discharged to appropriate level of care  Description: Discharged to appropriate level of care  9/1/2021 0919 by Christian Ellington RN  Outcome: Ongoing     Problem: Airway Clearance - Ineffective:  Goal: Ability to maintain a clear airway will improve  Description: Ability to maintain a clear airway will improve  9/1/2021 0919 by Christian Ellington RN  Outcome: Ongoing     Problem: Anxiety/Stress:  Goal: Level of anxiety will decrease  Description: Level of anxiety will decrease  9/1/2021 0919 by Christian Ellington RN  Outcome: Ongoing     Problem: Aspiration:  Goal: Absence of aspiration  Description: Absence of aspiration  9/1/2021 0919 by Christian Ellington RN  Outcome: Ongoing     Problem:  Bowel Function - Altered:  Goal: Bowel elimination is within specified parameters  Description: Bowel elimination is within specified parameters  9/1/2021 0919 by Christian Ellington RN  Outcome: Ongoing     Problem: Cardiac Output - Decreased:  Goal: Hemodynamic stability will improve  Description: Hemodynamic stability will improve  9/1/2021 0919 by Christian Ellington RN  Outcome: Ongoing     Problem: Fluid Volume - Imbalance:  Goal: Absence of imbalanced fluid volume signs and symptoms  Description: Absence of imbalanced fluid volume signs and symptoms  9/1/2021 0919 by Christian Ellington RN  Outcome: Ongoing     Problem: Gas Exchange - Impaired:  Goal: Levels of oxygenation will improve  Description: Levels of oxygenation will improve  9/1/2021 0919 by Christian Ellington RN  Outcome: Ongoing     Problem: Mental Status - Impaired:  Goal: Mental status will be restored to baseline  Description: Mental status will be restored to baseline  9/1/2021 0919 by Branden Barajas RN  Outcome: Ongoing     Problem: Nutrition Deficit:  Goal: Ability to achieve adequate nutritional intake will improve  Description: Ability to achieve adequate nutritional intake will improve  9/1/2021 0919 by Branden Barajas RN  Outcome: Ongoing     Problem: Pain:  Goal: Pain level will decrease  Description: Pain level will decrease  9/1/2021 0919 by Branden Barajas RN  Outcome: Ongoing     Problem: Pain:  Goal: Recognizes and communicates pain  Description: Recognizes and communicates pain  9/1/2021 0919 by Branden Barajas RN  Outcome: Ongoing     Problem: Pain:  Goal: Control of acute pain  Description: Control of acute pain  9/1/2021 0919 by Branden Barajas RN  Outcome: Ongoing     Problem: Pain:  Goal: Control of chronic pain  Description: Control of chronic pain  9/1/2021 0919 by Branden Barajas RN  Outcome: Ongoing     Problem: Serum Glucose Level - Abnormal:  Goal: Ability to maintain appropriate glucose levels will improve to within specified parameters  Description: Ability to maintain appropriate glucose levels will improve to within specified parameters  9/1/2021 0919 by Branden Barajas RN  Outcome: Ongoing     Problem: Skin Integrity - Impaired:  Goal: Will show no infection signs and symptoms  Description: Will show no infection signs and symptoms  9/1/2021 0919 by Branden Barajas RN  Outcome: Ongoing     Problem: Skin Integrity - Impaired:  Goal: Absence of new skin breakdown  Description: Absence of new skin breakdown  9/1/2021 0919 by Branden Barajas RN  Outcome: Ongoing     Problem: Sleep Pattern Disturbance:  Goal: Appears well-rested  Description: Appears well-rested  9/1/2021 0919 by Branden Barajas RN  Outcome: Ongoing     Problem: Tissue Perfusion, Altered:  Goal: Circulatory function within specified parameters  Description: Circulatory function within specified parameters  9/1/2021 0919 by Godfrey Rangel RN  Outcome: Ongoing     Problem: Tissue Perfusion - Cardiopulmonary, Altered:  Goal: Absence of angina  Description: Absence of angina  9/1/2021 0919 by Godfrey Rangel RN  Outcome: Ongoing     Problem: Tissue Perfusion - Cardiopulmonary, Altered:  Goal: Hemodynamic stability will improve  Description: Hemodynamic stability will improve  9/1/2021 0919 by Godfrey Rangel RN  Outcome: Ongoing     Problem: Falls - Risk of:  Goal: Will remain free from falls  Description: Will remain free from falls  9/1/2021 0919 by Godfrey Rangel RN  Outcome: Ongoing     Problem: Falls - Risk of:  Goal: Absence of physical injury  Description: Absence of physical injury  9/1/2021 0919 by Godfrey Rangel RN  Outcome: Ongoing     Problem: Skin Integrity:  Goal: Will show no infection signs and symptoms  Description: Will show no infection signs and symptoms  9/1/2021 0919 by Godfrey Rangel RN  Outcome: Ongoing     Problem: Skin Integrity:  Goal: Absence of new skin breakdown  Description: Absence of new skin breakdown  9/1/2021 0919 by Godfrey Rangel RN  Outcome: Ongoing

## 2021-09-01 NOTE — PROGRESS NOTES
Hospitalist Progress Note      PCP: Rich Beltrán    Date of Admission: 8/27/2021    Chief Complaint: Respiratory distress    Hospital Course: 55 y.o. female who presented to [facility] with past medical history of hypertension, type 2 diabetes, CKD stage IV, HFpEF, hyperlipidemia presented to the ED with chief complaint of respiratory distress.     History cannot be obtained due to patient being intubated sedated. On chart review patient had 3 or 4 arrival sitting in bed and also having some new onset dyspnea that was severe sudden onset and then started progressively turning blue in the lips for her  and EMS was called noted to have saturation 60% on muscles brought here emergently. Patient in the ED noted was unable to protect her airway thus was emergently intubated for lifesaving measures.       Subjective:      Intubated sedated, FiO2 30%, leukocytosis improving, creatinine improving, had HD yesterday with UF 1.8 L, urine output 2 L in the last 24 hours      Medications:  Reviewed    Infusion Medications    dexmedetomidine 0.4 mcg/kg/hr (09/01/21 1137)    fentaNYL 45 mcg/hr (09/01/21 0958)    dextrose      propofol 15 mcg/kg/min (09/01/21 1137)    sodium chloride Stopped (08/29/21 2301)     Scheduled Medications    heparin (porcine)  5,000 Units SubCUTAneous 3 times per day    epoetin yohana-epbx  10,000 Units SubCUTAneous Every Other Day    furosemide  40 mg IntraVENous TID    insulin lispro  0-6 Units SubCUTAneous Q4H    pantoprazole  40 mg IntraVENous BID    sodium chloride flush  5-40 mL IntraVENous 2 times per day     PRN Meds: albumin human, heparin (porcine), glucose, dextrose, glucagon (rDNA), dextrose, fentanNYL, sodium chloride flush, sodium chloride, ondansetron **OR** ondansetron, polyethylene glycol, acetaminophen **OR** acetaminophen, hydrALAZINE      Intake/Output Summary (Last 24 hours) at 9/1/2021 1148  Last data filed at 9/1/2021 1137  Gross per 24 hour   Intake 1592.11 ml   Output 4697 ml   Net -3104.89 ml       Physical Exam Performed:    BP (!) 136/58   Pulse 82   Temp 97.2 °F (36.2 °C) (Temporal)   Resp 16   Ht 5' 6\" (1.676 m)   Wt 214 lb 4.6 oz (97.2 kg)   SpO2 99%   BMI 34.59 kg/m²     General appearance: Appears comfortable on the vent looks approximately her stated age. HEENT: Pupils equal, round, and reactive to light. Conjunctivae/corneas clear. Neck: Supple, with full range of motion. No jugular venous distention. Trachea midline. Respiratory: She is vented and has some rales present at the bases bilaterally on her exam.  Eezes/Rhonchi. Cardiovascular: Regular rate and rhythm with normal S1/S2 without murmurs, rubs or gallops. Abdomen: Soft, non-tender, non-distended with normal bowel sounds. Musculoskeletal: No clubbing, cyanosis or edema bilaterally. Full range of motion without deformity. Skin: Skin color, texture, turgor normal.  No rashes or lesions. Neurologic: Intubated and sedated  Psychiatric: Intubated and sedated  Capillary Refill: Brisk,3 seconds, normal   Peripheral Pulses: +2 palpable, equal bilaterally       Labs:   Recent Labs     08/30/21 0425 08/31/21 0448 09/01/21  0406   WBC 16.6* 14.7* 11.6*   HGB 7.6* 7.0* 7.4*   HCT 23.4* 21.4* 22.5*    247 232     Recent Labs     08/30/21 0425 08/30/21  0425 08/30/21  1530 08/31/21 0448 09/01/21  0406     --   --  140 142   K 3.2*   < > 3.4* 3.5 3.5     --   --  110 110   CO2 14*  --   --  15* 19*   BUN 46*  --   --  49* 31*   CREATININE 5.5*  --   --  5.8* 4.0*   CALCIUM 7.5*  --   --  7.1* 7.7*   PHOS 5.5*  --   --  6.2* 4.4    < > = values in this interval not displayed. Recent Labs     08/30/21 0425 08/31/21  0448 09/01/21  0406   AST 8* 13* 9*   ALT 8* 6* <5*   BILITOT <0.2 <0.2 <0.2   ALKPHOS 106 114 119     No results for input(s): INR in the last 72 hours.   Recent Labs     08/30/21 0425 08/31/21 0448 09/01/21  0406   CKTOTAL 39 34 31       Urinalysis: Lab Results   Component Value Date    NITRU Negative 08/27/2021    WBCUA 7 08/27/2021    RBCUA 3 08/27/2021    BLOODU SMALL 08/27/2021    SPECGRAV 1.013 08/27/2021    GLUCOSEU 250 08/27/2021       Radiology:  XR CHEST PORTABLE   Final Result   There has been placement of a right internal jugular temporary dialysis   catheter with tip over the upper right atrium. Remainder of support   apparatus is stable in appearance. Persistent findings of pulmonary edema with probable small bilateral pleural   effusions. IR FLUORO GUIDED CVA DEVICE PLMT/REPLACE/REMOVAL   Final Result   Successful ultrasound and fluoroscopy guided placement of a temporary   dialysis catheter. XR CHEST PORTABLE   Final Result   Extensive bilateral pulmonary disease and pleural effusion, no significant   change over the past 48 hours. XR CHEST PORTABLE   Final Result   Enlargement of the cardiac silhouette with central vascular congestion as   well as bibasilar infiltrates and pleural effusions, which may represent a   combination of pulmonary edema as well as potential pneumonia. XR CHEST PORTABLE   Final Result   Endotracheal tube and enteric tube are in satisfactory position. The left IJ central venous catheter tip projects over the area of the left   brachiocephalic vein. Right basilar airspace disease. Left basilar opacification reflecting airspace disease, atelectasis or   pleural effusion.          CT CHEST WO CONTRAST   Final Result      CT HEAD WO CONTRAST   Final Result      NM LUNG VENT/PERFUSION (VQ)    (Results Pending)   VL Renal Arterial Duplex Complete    (Results Pending)           Assessment/Plan:    Active Hospital Problems    Diagnosis     Acute respiratory failure (HCC) [J96.00]     Acute on chronic diastolic heart failure (HCC) [I50.33]     Chronic kidney disease (CKD), stage III (moderate) (HCC) [N18.30]     Chronic diastolic (congestive) heart failure ([address] [I50.32]  Pulmonary edema [J81.1]        Acute hypoxic respiratory failure requiring mechanical ventilation  Continue to monitor  Covid testing negative  Completed course of cefepime, leukocytosis improving     Acute pulmonary edema  proBNP trending down, patient was on Lasix per pulmonary  Probably will need CRRT/HD nephrology on board  Unable to extubate per nursing, patient failed breathing trial    Acute on chronic HFpEF exacerbation  Has been on Lasix 3 times daily  Nephro is following, as is CHF nurse     Pneumonia  Suspected on CT imaging with leukocytosis  Completed 5-day course of cefepime    Blood culture grew staph epidermidis in 1 culture. Probable contamination     Hypertensive urgency, improving  On lasix, monitor BP     Acute on chronic renal failure  Being followed by nephrology  Had started HD on 8/31 with UF 1.8 L     Normocytic anemia  Iron panel ordered, Hemoccult     Chronic medical conditions  Type 2 Diabetes: Insulin sliding scale  Hyperlipidemia: Continue home medication  Class II obesity:Complicating assessment and treatment. Placing patient at risk for multiple co-morbidities as well as early death and contributing to the patient's presentation. Education, and counseling     DVT Prophylaxis: heparin SQ  Diet: Diet NPO  ADULT TUBE FEEDING; Orogastric; Renal Formula; Continuous; 15; No; 30; Q 4 hours  Code Status: Full Code    PT/OT Eval Status: After extubation    Due to the immediate potential for life-threatening deterioration due to acute hypoxic respiratory failure requiring mechanical ventilation, I spent 33 minutes providing critical care. This time is excluding time spent performing procedures.       Malathi Gutierrez MD

## 2021-09-01 NOTE — PROGRESS NOTES
09/01/21 0759   Vent Patient Data   Plateau Pressure 22 ABO07   Static Compliance 33.9 mL/cmH2O   Dynamic Compliance 21.4 mL/cmH2O

## 2021-09-02 ENCOUNTER — APPOINTMENT (OUTPATIENT)
Dept: GENERAL RADIOLOGY | Age: 46
DRG: 720 | End: 2021-09-02
Payer: COMMERCIAL

## 2021-09-02 LAB
A/G RATIO: 0.9 (ref 1.1–2.2)
ALBUMIN SERPL-MCNC: 2.5 G/DL (ref 3.4–5)
ALP BLD-CCNC: 112 U/L (ref 40–129)
ALT SERPL-CCNC: 6 U/L (ref 10–40)
ANION GAP SERPL CALCULATED.3IONS-SCNC: 13 MMOL/L (ref 3–16)
APTT: 34.4 SEC (ref 26.2–38.6)
AST SERPL-CCNC: 9 U/L (ref 15–37)
BASOPHILS ABSOLUTE: 0 K/UL (ref 0–0.2)
BASOPHILS RELATIVE PERCENT: 0.4 %
BILIRUB SERPL-MCNC: <0.2 MG/DL (ref 0–1)
BUN BLDV-MCNC: 22 MG/DL (ref 7–20)
CALCIUM IONIZED: 1.13 MMOL/L (ref 1.12–1.32)
CALCIUM SERPL-MCNC: 8.1 MG/DL (ref 8.3–10.6)
CHLORIDE BLD-SCNC: 109 MMOL/L (ref 99–110)
CO2: 18 MMOL/L (ref 21–32)
CREAT SERPL-MCNC: 3.3 MG/DL (ref 0.6–1.1)
EOSINOPHILS ABSOLUTE: 0.8 K/UL (ref 0–0.6)
EOSINOPHILS RELATIVE PERCENT: 7.8 %
GFR AFRICAN AMERICAN: 18
GFR NON-AFRICAN AMERICAN: 15
GLOBULIN: 2.9 G/DL
GLUCOSE BLD-MCNC: 106 MG/DL (ref 70–99)
GLUCOSE BLD-MCNC: 108 MG/DL (ref 70–99)
GLUCOSE BLD-MCNC: 109 MG/DL (ref 70–99)
GLUCOSE BLD-MCNC: 109 MG/DL (ref 70–99)
GLUCOSE BLD-MCNC: 110 MG/DL (ref 70–99)
GLUCOSE BLD-MCNC: 110 MG/DL (ref 70–99)
GLUCOSE BLD-MCNC: 123 MG/DL (ref 70–99)
HCT VFR BLD CALC: 21.8 % (ref 36–48)
HEMOGLOBIN: 7.4 G/DL (ref 12–16)
HEPATITIS B CORE TOTAL ANTIBODY: NEGATIVE
LACTIC ACID: 0.6 MMOL/L (ref 0.4–2)
LYMPHOCYTES ABSOLUTE: 1.5 K/UL (ref 1–5.1)
LYMPHOCYTES RELATIVE PERCENT: 15.5 %
MAGNESIUM: 2 MG/DL (ref 1.8–2.4)
MCH RBC QN AUTO: 29.9 PG (ref 26–34)
MCHC RBC AUTO-ENTMCNC: 34 G/DL (ref 31–36)
MCV RBC AUTO: 87.8 FL (ref 80–100)
MONOCYTES ABSOLUTE: 0.9 K/UL (ref 0–1.3)
MONOCYTES RELATIVE PERCENT: 9.6 %
NEUTROPHILS ABSOLUTE: 6.6 K/UL (ref 1.7–7.7)
NEUTROPHILS RELATIVE PERCENT: 66.7 %
PDW BLD-RTO: 16.1 % (ref 12.4–15.4)
PERFORMED ON: ABNORMAL
PH VENOUS: 7.38 (ref 7.35–7.45)
PHOSPHORUS: 3.9 MG/DL (ref 2.5–4.9)
PLATELET # BLD: 198 K/UL (ref 135–450)
PMV BLD AUTO: 8.7 FL (ref 5–10.5)
POTASSIUM SERPL-SCNC: 3.6 MMOL/L (ref 3.5–5.1)
PRO-BNP: ABNORMAL PG/ML (ref 0–124)
RBC # BLD: 2.48 M/UL (ref 4–5.2)
SODIUM BLD-SCNC: 140 MMOL/L (ref 136–145)
TOTAL CK: 43 U/L (ref 26–192)
TOTAL PROTEIN: 5.4 G/DL (ref 6.4–8.2)
WBC # BLD: 9.9 K/UL (ref 4–11)

## 2021-09-02 PROCEDURE — 94761 N-INVAS EAR/PLS OXIMETRY MLT: CPT

## 2021-09-02 PROCEDURE — 6360000002 HC RX W HCPCS: Performed by: EMERGENCY MEDICINE

## 2021-09-02 PROCEDURE — 6360000002 HC RX W HCPCS: Performed by: INTERNAL MEDICINE

## 2021-09-02 PROCEDURE — 83880 ASSAY OF NATRIURETIC PEPTIDE: CPT

## 2021-09-02 PROCEDURE — 2500000003 HC RX 250 WO HCPCS: Performed by: INTERNAL MEDICINE

## 2021-09-02 PROCEDURE — 90935 HEMODIALYSIS ONE EVALUATION: CPT

## 2021-09-02 PROCEDURE — 2580000003 HC RX 258: Performed by: INTERNAL MEDICINE

## 2021-09-02 PROCEDURE — 80053 COMPREHEN METABOLIC PANEL: CPT

## 2021-09-02 PROCEDURE — 85730 THROMBOPLASTIN TIME PARTIAL: CPT

## 2021-09-02 PROCEDURE — 83735 ASSAY OF MAGNESIUM: CPT

## 2021-09-02 PROCEDURE — 84100 ASSAY OF PHOSPHORUS: CPT

## 2021-09-02 PROCEDURE — C9113 INJ PANTOPRAZOLE SODIUM, VIA: HCPCS | Performed by: INTERNAL MEDICINE

## 2021-09-02 PROCEDURE — 99291 CRITICAL CARE FIRST HOUR: CPT | Performed by: INTERNAL MEDICINE

## 2021-09-02 PROCEDURE — 83605 ASSAY OF LACTIC ACID: CPT

## 2021-09-02 PROCEDURE — 2000000000 HC ICU R&B

## 2021-09-02 PROCEDURE — 6370000000 HC RX 637 (ALT 250 FOR IP): Performed by: INTERNAL MEDICINE

## 2021-09-02 PROCEDURE — 94003 VENT MGMT INPAT SUBQ DAY: CPT

## 2021-09-02 PROCEDURE — 85025 COMPLETE CBC W/AUTO DIFF WBC: CPT

## 2021-09-02 PROCEDURE — 71045 X-RAY EXAM CHEST 1 VIEW: CPT

## 2021-09-02 PROCEDURE — 2700000000 HC OXYGEN THERAPY PER DAY

## 2021-09-02 PROCEDURE — 82330 ASSAY OF CALCIUM: CPT

## 2021-09-02 PROCEDURE — 82550 ASSAY OF CK (CPK): CPT

## 2021-09-02 RX ADMIN — Medication 10 ML: at 20:15

## 2021-09-02 RX ADMIN — HEPARIN SODIUM 5000 UNITS: 5000 INJECTION INTRAVENOUS; SUBCUTANEOUS at 05:47

## 2021-09-02 RX ADMIN — Medication 45 MCG/HR: at 22:48

## 2021-09-02 RX ADMIN — HEPARIN SODIUM 5000 UNITS: 5000 INJECTION INTRAVENOUS; SUBCUTANEOUS at 20:15

## 2021-09-02 RX ADMIN — ACETAMINOPHEN 650 MG: 650 SUPPOSITORY RECTAL at 16:48

## 2021-09-02 RX ADMIN — PROPOFOL 20 MCG/KG/MIN: 10 INJECTION, EMULSION INTRAVENOUS at 03:06

## 2021-09-02 RX ADMIN — Medication 10 ML: at 09:08

## 2021-09-02 RX ADMIN — PROPOFOL 30 MCG/KG/MIN: 10 INJECTION, EMULSION INTRAVENOUS at 13:47

## 2021-09-02 RX ADMIN — DEXMEDETOMIDINE HYDROCHLORIDE 0.3 MCG/KG/HR: 4 INJECTION, SOLUTION INTRAVENOUS at 05:48

## 2021-09-02 RX ADMIN — Medication 45 MCG/HR: at 10:51

## 2021-09-02 RX ADMIN — PANTOPRAZOLE SODIUM 40 MG: 40 INJECTION, POWDER, FOR SOLUTION INTRAVENOUS at 09:08

## 2021-09-02 RX ADMIN — PROPOFOL 15 MCG/KG/MIN: 10 INJECTION, EMULSION INTRAVENOUS at 18:45

## 2021-09-02 RX ADMIN — HEPARIN SODIUM 5000 UNITS: 5000 INJECTION INTRAVENOUS; SUBCUTANEOUS at 13:51

## 2021-09-02 RX ADMIN — FUROSEMIDE 40 MG: 10 INJECTION, SOLUTION INTRAMUSCULAR; INTRAVENOUS at 13:51

## 2021-09-02 RX ADMIN — FUROSEMIDE 40 MG: 10 INJECTION, SOLUTION INTRAMUSCULAR; INTRAVENOUS at 20:15

## 2021-09-02 RX ADMIN — FUROSEMIDE 40 MG: 10 INJECTION, SOLUTION INTRAMUSCULAR; INTRAVENOUS at 09:08

## 2021-09-02 RX ADMIN — DEXMEDETOMIDINE HYDROCHLORIDE 0.3 MCG/KG/HR: 4 INJECTION, SOLUTION INTRAVENOUS at 20:12

## 2021-09-02 RX ADMIN — PANTOPRAZOLE SODIUM 40 MG: 40 INJECTION, POWDER, FOR SOLUTION INTRAVENOUS at 20:15

## 2021-09-02 ASSESSMENT — PAIN SCALES - GENERAL
PAINLEVEL_OUTOF10: 0

## 2021-09-02 ASSESSMENT — PULMONARY FUNCTION TESTS
PIF_VALUE: 33
PIF_VALUE: 28
PIF_VALUE: 33
PIF_VALUE: 33
PIF_VALUE: 27
PIF_VALUE: 32

## 2021-09-02 NOTE — PROGRESS NOTES
Hospitalist Progress Note      PCP: Francis Arteaga    Date of Admission: 8/27/2021    Chief Complaint: Respiratory distress    Hospital Course: 55 y.o. female who presented to [facility] with past medical history of hypertension, type 2 diabetes, CKD stage IV, HFpEF, hyperlipidemia presented to the ED with chief complaint of respiratory distress.     History cannot be obtained due to patient being intubated sedated. On chart review patient had 3 or 4 arrival sitting in bed and also having some new onset dyspnea that was severe sudden onset and then started progressively turning blue in the lips for her  and EMS was called noted to have saturation 60% on muscles brought here emergently. Patient in the ED noted was unable to protect her airway thus was emergently intubated for lifesaving measures.       Subjective:     Remains intubated and sedated, negative cumulative balance 6.2 L, had 850 mL over the last 24 hours, proBNP improving, creatinine 3.3.       Medications:  Reviewed    Infusion Medications    dexmedetomidine 0.3 mcg/kg/hr (09/02/21 0751)    fentaNYL 45 mcg/hr (09/02/21 1051)    dextrose      propofol 10 mcg/kg/min (09/02/21 0929)    sodium chloride Stopped (08/29/21 2301)     Scheduled Medications    heparin (porcine)  5,000 Units SubCUTAneous 3 times per day    epoetin yohana-epbx  10,000 Units SubCUTAneous Every Other Day    furosemide  40 mg IntraVENous TID    insulin lispro  0-6 Units SubCUTAneous Q4H    pantoprazole  40 mg IntraVENous BID    sodium chloride flush  5-40 mL IntraVENous 2 times per day     PRN Meds: albumin human, heparin (porcine), glucose, dextrose, glucagon (rDNA), dextrose, fentanNYL, sodium chloride flush, sodium chloride, ondansetron **OR** ondansetron, polyethylene glycol, acetaminophen **OR** acetaminophen, hydrALAZINE      Intake/Output Summary (Last 24 hours) at 9/2/2021 1336  Last data filed at 9/2/2021 0908  Gross per 24 hour   Intake 700.41 ml Output 1950 ml   Net -1249.59 ml       Physical Exam Performed:    BP (!) 160/63   Pulse 95   Temp 99.5 °F (37.5 °C) (Core)   Resp 16   Ht 5' 6\" (1.676 m)   Wt 209 lb 10.5 oz (95.1 kg)   SpO2 95%   BMI 33.84 kg/m²     General appearance: Appears comfortable on the vent looks approximately her stated age. HEENT: Pupils equal, round, and reactive to light. Conjunctivae/corneas clear. Neck: Supple, with full range of motion. No jugular venous distention. Trachea midline. Respiratory: She is vented and has some rales present at the bases bilaterally on her exam.  Eezes/Rhonchi. Cardiovascular: Regular rate and rhythm with normal S1/S2 without murmurs, rubs or gallops. Abdomen: Soft, non-tender, non-distended with normal bowel sounds. Musculoskeletal: No clubbing, cyanosis or edema bilaterally. Full range of motion without deformity. Skin: Skin color, texture, turgor normal.  No rashes or lesions. Neurologic: Intubated and sedated  Psychiatric: Intubated and sedated  Capillary Refill: Brisk,3 seconds, normal   Peripheral Pulses: +2 palpable, equal bilaterally       Labs:   Recent Labs     08/31/21 0448 09/01/21 0406 09/02/21  0530   WBC 14.7* 11.6* 9.9   HGB 7.0* 7.4* 7.4*   HCT 21.4* 22.5* 21.8*    232 198     Recent Labs     08/31/21 0448 09/01/21 0406 09/02/21  0530    142 140   K 3.5 3.5 3.6    110 109   CO2 15* 19* 18*   BUN 49* 31* 22*   CREATININE 5.8* 4.0* 3.3*   CALCIUM 7.1* 7.7* 8.1*   PHOS 6.2* 4.4 3.9     Recent Labs     08/31/21 0448 09/01/21  0406 09/02/21  0530   AST 13* 9* 9*   ALT 6* <5* 6*   BILITOT <0.2 <0.2 <0.2   ALKPHOS 114 119 112     No results for input(s): INR in the last 72 hours.   Recent Labs     08/31/21  0448 09/01/21  0406 09/02/21  0530   CKTOTAL 34 31 43       Urinalysis:      Lab Results   Component Value Date    NITRU Negative 08/27/2021    WBCUA 7 08/27/2021    RBCUA 3 08/27/2021    BLOODU SMALL 08/27/2021    SPECGRAV 1.013 08/27/2021 GLUCOSEU 250 08/27/2021       Radiology:  XR CHEST PORTABLE   Final Result   Stable support apparatus. Persistent findings suggestive of pulmonary edema with small bilateral   pleural effusions, left greater than right. There is more focal consolidation in the left lung base, atelectasis versus   pneumonia. XR CHEST PORTABLE   Final Result   There has been placement of a right internal jugular temporary dialysis   catheter with tip over the upper right atrium. Remainder of support   apparatus is stable in appearance. Persistent findings of pulmonary edema with probable small bilateral pleural   effusions. IR FLUORO GUIDED CVA DEVICE PLMT/REPLACE/REMOVAL   Final Result   Successful ultrasound and fluoroscopy guided placement of a temporary   dialysis catheter. XR CHEST PORTABLE   Final Result   Extensive bilateral pulmonary disease and pleural effusion, no significant   change over the past 48 hours. XR CHEST PORTABLE   Final Result   Enlargement of the cardiac silhouette with central vascular congestion as   well as bibasilar infiltrates and pleural effusions, which may represent a   combination of pulmonary edema as well as potential pneumonia. XR CHEST PORTABLE   Final Result   Endotracheal tube and enteric tube are in satisfactory position. The left IJ central venous catheter tip projects over the area of the left   brachiocephalic vein. Right basilar airspace disease. Left basilar opacification reflecting airspace disease, atelectasis or   pleural effusion.          CT CHEST WO CONTRAST   Final Result      CT HEAD WO CONTRAST   Final Result      NM LUNG VENT/PERFUSION (VQ)    (Results Pending)   VL Renal Arterial Duplex Complete    (Results Pending)           Assessment/Plan:    Active Hospital Problems    Diagnosis     Acute respiratory failure (HCC) [J96.00]     Acute on chronic diastolic heart failure (HCC) [I50.33]     Chronic kidney disease (CKD), stage III (moderate) (HCC) [N18.30]     Chronic diastolic (congestive) heart failure (HCC) [I50.32]     Pulmonary edema [J81.1]        Acute hypoxic respiratory failure requiring mechanical ventilation  Continue to monitor  Covid testing negative  Completed course of cefepime, leukocytosis improving     Acute pulmonary edema  proBNP trending down, patient was on Lasix per pulmonary  Probably will need CRRT/HD nephrology on board  Unable to extubate per nursing, patient failed breathing trial    Acute on chronic HFpEF exacerbation  Has been on Lasix 3 times daily  Nephro is following, as is CHF nurse     Pneumonia  Suspected on CT imaging with leukocytosis  Completed 5-day course of cefepime    Blood culture grew staph epidermidis in 1 culture. Probable contamination     Hypertensive urgency, improving  On lasix, monitor BP     Acute on chronic renal failure  Being followed by nephrology  Had started HD on 8/31 with UF 1.8 L    She was given HD today. Negative balance of 6.2 L    Normocytic anemia  Iron panel ordered, Hemoccult     Chronic medical conditions  Type 2 Diabetes: Insulin sliding scale  Hyperlipidemia: Continue home medication  Class II obesity:Complicating assessment and treatment. Placing patient at risk for multiple co-morbidities as well as early death and contributing to the patient's presentation. Education, and counseling     DVT Prophylaxis: heparin SQ  Diet: Diet NPO  ADULT TUBE FEEDING; Orogastric; Renal Formula; Continuous; 15; No; 30; Q 4 hours  Code Status: Full Code    PT/OT Eval Status: After extubation    Due to the immediate potential for life-threatening deterioration due to acute hypoxic respiratory failure requiring mechanical ventilation, I spent 33 minutes providing critical care. This time is excluding time spent performing procedures.       Divina Swanson MD

## 2021-09-02 NOTE — PROGRESS NOTES
[facility] Daily Progress Note      Admit Date:  8/27/2021    Chief Complaint   Patient presents with    Respiratory Distress     Pt brought in per [facility] after call from  for resp distress. O2 sat 70's, BMV in route. Pt will open eyes to voice. Pupils 8mm and fixed. Subjective:  Ms. Avi Balderas continues to be intubated and sedated.  On minimal vent settings    Objective:   BP (!) 150/58   Pulse 95   Temp 99.5 °F (37.5 °C) (Core)   Resp 16   Ht 5' 6\" (1.676 m)   Wt 209 lb 10.5 oz (95.1 kg)   SpO2 95%   BMI 33.84 kg/m²       Intake/Output Summary (Last 24 hours) at 9/2/2021 1550  Last data filed at 9/2/2021 1545  Gross per 24 hour   Intake 700.41 ml   Output 2050 ml   Net -1349.59 ml       TELEMETRY: Sinus     Physical Exam:  General:  Intubated, sedated, NAD  Skin:  Warm and dry  Neck:  JVD +  Chest:  crackles  Cardiovascular:  RRR S1S2, no S3, no mrmr  Abdomen:  Soft, ND, NT, No HSM  Extremities:  1+ edema    Medications:    heparin (porcine)  5,000 Units SubCUTAneous 3 times per day    epoetin yohana-epbx  10,000 Units SubCUTAneous Every Other Day    furosemide  40 mg IntraVENous TID    insulin lispro  0-6 Units SubCUTAneous Q4H    pantoprazole  40 mg IntraVENous BID    sodium chloride flush  5-40 mL IntraVENous 2 times per day      dexmedetomidine 0.3 mcg/kg/hr (09/02/21 0751)    fentaNYL 45 mcg/hr (09/02/21 1051)    dextrose      propofol 30 mcg/kg/min (09/02/21 1347)    sodium chloride Stopped (08/29/21 2301)     albumin human, heparin (porcine), glucose, dextrose, glucagon (rDNA), dextrose, fentanNYL, sodium chloride flush, sodium chloride, ondansetron **OR** ondansetron, polyethylene glycol, acetaminophen **OR** acetaminophen, hydrALAZINE    Lab Data:  CBC:   Recent Labs     08/31/21  0448 09/01/21  0406 09/02/21  0530   WBC 14.7* 11.6* 9.9   HGB 7.0* 7.4* 7.4*   HCT 21.4* 22.5* 21.8*   MCV 88.2 87.5 87.8    232 198     BMP:   Recent Labs

## 2021-09-02 NOTE — PROGRESS NOTES
Treatment time: 3 hrs   Net UF: 2500 ml    Pre weight: 94 kg  Post weight: 91.5  EDW: TBD    Access used:  Rt CVC  Access function: good    Medications or blood products given: NA    Regular outpatient schedule: TBD    Summary of response to treatment: tolerated HD well    Copy of dialysis treatment record placed in chart, to be scanned into EMR.

## 2021-09-02 NOTE — PROGRESS NOTES
MD Young Garcia MD Gaylyn Mourning, MD               Office: [phone]                      Fax: [phone] [facility]                   NEPHROLOGY CVU INPATIENT PROGRESS NOTE:     PATIENT NAME: Will Benavides  : 1975  MRN: [id number]       RECOMMENDATIONS:     - HD #3 today, Thursday, incremental flows, more UF ~2-3L if tolerated  - Needs a long term access, eventually prefered [facility] w/ h/o advanced CKD, likely will need long-term    -appreciated assistance by Dr Chelle De La Torre for Rt IJ non-TDC placement ()   -Hemodialysis #1 , tolerated well    - continue Lasix too- Rx: IV lasix  40- TID - as making urine too   -Monitor acidosis,  -Hold home dose of aldactone, lisinopril,  -PNA Rx w/ iv abx    -BP is improving w/ hydralazine, use PRN for >150,  -vent mx per [address]  - at higher risk for decompensation, needing closer monitoring. D/C plan from renal stand point:  - unclear, as critically ill     Have Discussed with pt's  in details. Discussed with patient's treatment team-  CVU nurse, hospitalist-Dr. Nancy Roy      IMPRESSION:       Admitted for:  Acute respiratory failure ([address] [J96.00]  Encephalopathy [G93.40]  Respiratory failure with hypoxia, unspecified chronicity ([address] [J96.91]  Pneumonia due to infectious organism, unspecified laterality, unspecified part of lung [J18.9]      KINZA (on proteinuric CKD: 4):  Worsening  - BL Scr-last known 2.5, in 2021,   ---> 4.6 on admission  -: Etiology of KINZA -presumed ATN in the setting of pneumonia, unclear if it this is advancing CKD    History of nephrotic range proteinuria,   - thought to be from diabetic nephropathy With CKD stage III -> so high risk of advanced CKD,  -Follows with Dr. Monalisa Gonzalez  -Noncompliance, last follow-up was 2021 then lost for follow-up  -echo results -  IVC size is dilated (>2.1 cm) but collapses > 50% with respiration consistent with elevated RA pressure (8 mmHg).   -Last echo-8/2020  moderate concentric LVH, normal size and function with EF of 08%, normal diastolic function      Associated problems:   Hypokalemia-needing repletion  Acidosis, non-anion gap-  Hypomagnesemia-  Hypophosphatemia  Anemia, chronic disease-worsening        Other major problems: Management per primary and other consulting teams.   //Acute hypoxic respiratory failure -needing intubation  // Multifocal airspace disease that likely represents a combination of aspiration/pneumonia and pulmonary edema  //Fluid overload  -COVID was ruled out  //History of uncontrolled diabetes last A1c was 11.3    // Hypertension un-controlled,       Hospital Problems         Last Modified POA    Pulmonary edema 8/28/2021 Yes    Chronic kidney disease (CKD), stage III (moderate) ([facility]) 8/28/2021 Yes    Chronic diastolic (congestive) heart failure ([address] 8/28/2021 Yes    Acute on chronic diastolic heart failure ([address] 8/28/2021 Yes    Acute respiratory failure ([facility] 8/27/2021 Yes        : other supportive care :   - Check daily renal function panel with electrolytes-phosphorus  - Strict monitoring of I/Os, daily weight  - Renal feeds/diet  - Current medications reviewed. - Nephrotoxic medications have been discontinued. - Dose adjusted and appropriate. - Dose meds for eGFR <15 mL/min/1.73m2 during KINZA    - Avoid heavy opioids due to renal failure - may use very low dose dilaudid / fentanyl with close monitoring of CNS and respiratory depression. Please refer to the orders. High Complexity. Multiple complex problems. Discussed with patient 's treatment team- ICU team, nurse     Thank you for allowing me to participate in this patient's care. Please do not hesitate to contact me anytime. We will follow along with you.        Mehul Chung MD,  [facility] [address] [place]: [phone] or [employer]  Fax: [phone]     Severally ill, at risk of impending organ failure needing ICU higher level of care/monitoring. Time spent  35 minutes that included face-to-face meeting/discussion with patient's treatment team (including primary/referring team and other consultants; included coordination of care with the treatment team; and review of patient's electronic medical records and ordering appropriates tests.         ========================================================   ========================================================   Subjective:   Patient currently still  ill, remains in ICU as remains intubated sedated,   HD tolerating ok so far,   UOP (+) noted   Past medical, Surgical, Social, Family medical history reviewed by me. MEDICATIONS: reviewed by me. Medications Prior to Admission:  No current facility-administered medications on file prior to encounter.      Current Outpatient Medications on File Prior to Encounter   Medication Sig Dispense Refill    Dulaglutide 0.75 MG/0.5ML SOPN Inject 0.75 mg into the skin once a week 4 pen 1    ibuprofen (ADVIL;MOTRIN) 200 MG CAPS Take 1 capsule by mouth      furosemide (LASIX) 80 MG tablet Take 80 mg by mouth every morning 80mg in the morning 40mg in the evening      amLODIPine (NORVASC) 10 MG tablet Take 1 tablet by mouth daily 30 tablet 1    furosemide (LASIX) 40 MG tablet Take 1 tablet by mouth every evening 30 tablet 1    spironolactone (ALDACTONE) 50 MG tablet Take 1 tablet by mouth daily 30 tablet 2    insulin glargine (LANTUS;BASAGLAR) 100 UNIT/ML injection pen Inject 30 Units into the skin daily 4 pen 2    lisinopril (PRINIVIL;ZESTRIL) 40 MG tablet Take 1 tablet by mouth daily 30 tablet 2    atorvastatin (LIPITOR) 40 MG tablet Take 1 tablet by mouth nightly 30 tablet 3    Insulin Pen Needle 29G X 12.7MM MISC 1 each by Does not apply route daily 100 each 5    propranolol (INDERAL LA) 80 MG extended release capsule Take 1 capsule by mouth every morning 30 capsule 2    LORazepam (ATIVAN) 0.5 MG tablet Take 0.5 mg by mouth 2 times daily as needed for Anxiety.  aspirin 81 MG EC tablet Take 1 tablet by mouth daily 30 tablet 3    pregabalin (LYRICA) 75 MG capsule Take 1 capsule by mouth nightly for 7 days. 7 capsule 0    sertraline (ZOLOFT) 50 MG tablet Take 1 tablet by mouth daily 30 tablet 3    glucose monitoring kit (FREESTYLE) monitoring kit 1 kit by Does not apply route daily 1 kit 0    insulin lispro, 1 Unit Dial, 100 UNIT/ML SOPN Inject 0-6 Units into the skin 3 times daily (with meals) **Corrective Low Dose Algorithm**  Glucose: Dose:               No Insulin  140-199 1 Unit  200-249 2 Units  250-299 3 Units  300-349 4 Units  350-399 5 Units  Over 399 6 Units (Patient taking differently: Inject 6-12 Units into the skin 3 times daily (with meals) **Corrective Low Dose Algorithm**  Glucose: Dose:               No Insulin  140-199 1 Unit  200-249 2 Units  250-299 3 Units  300-349 4 Units  350-399 5 Units  Over 399 6 Units) 3 pen 0    glucose blood VI test strips (VICTORY AGM-4000 TEST) strip Test four times daily until controlled and then three times daily.   Code 250.02  ONE TOUCH ULTRA MONITOR 100 strip 12         Current Facility-Administered Medications:     dexmedetomidine (PRECEDEX) 400 mcg in sodium chloride 0.9 % 100 mL infusion, 0.2-1.4 mcg/kg/hr, IntraVENous, Continuous, Ramonita Briseno MD, Last Rate: 7.3 mL/hr at 09/02/21 0751, 0.3 mcg/kg/hr at 09/02/21 0751    heparin (porcine) injection 5,000 Units, 5,000 Units, SubCUTAneous, 3 times per day, Ramonita Briseno MD, 5,000 Units at 09/02/21 0547    fentaNYL 10 mcg/mL infusion, 12.5-200 mcg/hr, IntraVENous, Continuous, Ramonita Briseno MD, Last Rate: 4.5 mL/hr at 09/02/21 0751, 45 mcg/hr at 09/02/21 0751    albumin human 25 % IV solution 25 g, 25 g, IntraVENous, PRN, Christopher Hill MD, Stopped at 09/01/21 1439    epoetin yohana-epbx (RETACRIT) injection 10,000 Units, 10,000 Units, SubCUTAneous, Every Other Day, Christopher Hill MD, 10,000 Units at 09/01/21 1517    heparin (porcine) injection 2,400 Units, 2,400 Units, IntraCATHeter, PRN, Stephanie Chapa MD    furosemide (LASIX) injection 40 mg, 40 mg, IntraVENous, TID, Stephanie Chapa MD, 40 mg at 09/02/21 0908    insulin lispro (1 Unit Dial) 0-6 Units, 0-6 Units, SubCUTAneous, Q4H, Brittany Dejesus DO    glucose (GLUTOSE) 40 % oral gel 15 g, 15 g, Oral, PRN, Brittany Dejesus, DO    dextrose 50 % IV solution, 12.5 g, IntraVENous, PRN, Brittany Dejesus, DO    glucagon (rDNA) injection 1 mg, 1 mg, IntraMUSCular, PRN, Brittany eDjesus, DO    dextrose 5 % solution, 100 mL/hr, IntraVENous, PRN, Brittany Dejesus, DO    pantoprazole (PROTONIX) injection 40 mg, 40 mg, IntraVENous, BID, Keegan Smith MD, 40 mg at 09/02/21 0908    propofol injection, 5-50 mcg/kg/min, IntraVENous, Titrated, Maddy Wise MD, Last Rate: 13.3 mL/hr at 09/02/21 0750, 20 mcg/kg/min at 09/02/21 0750    fentaNYL (SUBLIMAZE) injection 50 mcg, 50 mcg, IntraVENous, Q1H PRN, Maddy Wise MD    sodium chloride flush 0.9 % injection 5-40 mL, 5-40 mL, IntraVENous, 2 times per day, Brittany Dejesus DO, 10 mL at 09/02/21 0908    sodium chloride flush 0.9 % injection 5-40 mL, 5-40 mL, IntraVENous, PRN, Brittany Dejesus DO, 10 mL at 08/31/21 1532    0.9 % sodium chloride infusion, 25 mL, IntraVENous, PRN, Christiano Dejesus DO, Stopped at 08/29/21 2301    ondansetron (ZOFRAN-ODT) disintegrating tablet 4 mg, 4 mg, Oral, Q8H PRN **OR** ondansetron (ZOFRAN) injection 4 mg, 4 mg, IntraVENous, Q6H PRN, Brittany Dejesus DO    polyethylene glycol (GLYCOLAX) packet 17 g, 17 g, Oral, Daily PRN, Michelle Dejesus DO    acetaminophen (TYLENOL) tablet 650 mg, 650 mg, Oral, Q6H PRN **OR** acetaminophen (TYLENOL) suppository 650 mg, 650 mg, Rectal, Q6H PRN, Michelle Dejesus DO    hydrALAZINE (APRESOLINE) injection 10 mg, 10 mg, IntraVENous, Q4H PRN, Brittany Dejesus DO, 10 mg at 08/28/21 1121      REVIEW OF SYSTEMS:     Review of systems not obtained due to patient factors - intubation       PHYSICAL EXAM:  Recent vital signs and recent I/Os reviewed by me.      Wt Readings from Last 3 Encounters:   09/02/21 209 lb 10.5 oz (95.1 kg)   07/08/21 244 lb 11.2 oz (111 kg)   02/26/21 237 lb 3.2 oz (107.6 kg)     BP Readings from Last 3 Encounters:   09/02/21 (!) 149/59   07/08/21 (!) 160/100   02/26/21 133/86     Patient Vitals for the past 24 hrs:   BP Temp Temp src Pulse Resp SpO2 Height Weight   09/02/21 0805 (!) 149/59 98.7 °F (37.1 °C) Temporal 77 16 96 % -- --   09/02/21 0600 (!) 151/56 -- -- 78 16 95 % -- 209 lb 10.5 oz (95.1 kg)   09/02/21 0500 (!) 156/60 -- -- 78 16 95 % -- --   09/02/21 0400 (!) 154/61 99.6 °F (37.6 °C) CORE 76 16 96 % -- --   09/02/21 0303 -- -- -- 72 16 96 % -- --   09/02/21 0300 (!) 166/63 -- -- 72 16 96 % -- --   09/02/21 0200 (!) 174/64 -- -- 69 16 96 % -- --   09/02/21 0100 (!) 179/63 -- -- 67 16 97 % -- --   09/02/21 0030 (!) 179/63 -- -- 66 16 97 % -- --   09/02/21 0001 -- 95.8 °F (35.4 °C) Temporal 65 16 97 % -- --   09/02/21 0000 (!) 180/63 95.6 °F (35.3 °C) CORE 65 16 97 % -- --   09/01/21 2341 -- -- -- 65 16 97 % -- --   09/01/21 2330 (!) 178/63 -- -- 65 16 98 % -- --   09/01/21 2300 (!) 173/67 -- -- 67 16 98 % -- --   09/01/21 2230 (!) 158/59 -- -- 68 16 97 % -- --   09/01/21 2200 (!) 158/60 -- -- 69 16 95 % -- --   09/01/21 2130 (!) 176/66 -- -- 68 16 97 % -- --   09/01/21 2115 (!) 171/66 -- -- 70 16 96 % -- --   09/01/21 2100 (!) 172/65 -- -- 71 16 97 % -- --   09/01/21 2030 (!) 174/63 -- -- 69 16 97 % -- --   09/01/21 2000 (!) 172/64 96.6 °F (35.9 °C) CORE 69 16 98 % -- --   09/01/21 1745 (!) 159/66 -- -- 72 16 97 % -- --   09/01/21 1730 (!) 158/64 -- -- 74 16 97 % -- --   09/01/21 1715 (!) 161/67 -- -- 73 16 97 % -- --   09/01/21 1700 (!) 164/65 -- -- 73 16 97 % -- --   09/01/21 1645 (!) 173/72 -- -- 71 16 97 % -- --   09/01/21 1630 (!) 170/69 -- -- 72 16 97 % -- --   09/01/21 1615 (!) 169/68 -- -- 72 16 97 % -- -- 09/01/21 1600 (!) 164/68 97 °F (36.1 °C) Temporal 73 16 98 % -- --   09/01/21 1545 (!) 157/69 97.8 °F (36.6 °C) -- 72 16 100 % -- --   09/01/21 1530 (!) 133/58 -- -- 67 16 100 % -- --   09/01/21 1515 (!) 130/57 -- -- 68 16 100 % -- --   09/01/21 1500 (!) 102/49 -- -- 67 16 98 % -- --   09/01/21 1445 (!) 139/59 -- -- 71 16 100 % -- --   09/01/21 1430 (!) 130/51 -- -- 69 16 99 % -- --   09/01/21 1415 (!) 133/56 -- -- 69 16 99 % -- --   09/01/21 1400 (!) 127/57 -- -- 70 16 99 % -- --   09/01/21 1345 (!) 108/51 -- -- 70 16 98 % -- --   09/01/21 1330 (!) 89/46 -- -- 67 16 98 % -- --   09/01/21 1315 (!) 118/55 -- -- 68 16 98 % -- --   09/01/21 1300 (!) 166/69 -- -- 71 16 99 % -- --   09/01/21 1246 (!) 154/64 97.6 °F (36.4 °C) -- 72 16 -- -- --   09/01/21 1245 (!) 156/64 -- -- 72 16 99 % -- --   09/01/21 1212 -- -- -- -- -- -- 5' 6\" (1.676 m) --   09/01/21 1200 (!) 148/63 97.8 °F (36.6 °C) Bladder 72 16 99 % -- --   09/01/21 1000 (!) 136/58 -- -- 82 16 99 % -- --       Intake/Output Summary (Last 24 hours) at 9/2/2021 0915  Last data filed at 9/2/2021 3189  Gross per 24 hour   Intake 984.43 ml   Output 2527 ml   Net -1542.57 ml          Constitutional: Poorly responsive, Intubated and  obese habitus  Eyes: Conjunctiva clear and Noscleral icterus  Ear, Nose, and Throat: moist oral mucosa; ET to vent  Neck: Trachea midline,  No jugular venous distension  Cardiovascular: Regular rate and ryhthm, normal S1 and S2,    Respiratory: Mechanically ventilated; Lung sounds notable for synchronous vent-associated breath sounds  Abdomen:  distended. Normal bowel sounds. : Meza catheter is inserted, draining urine  Skin: no rash,  bruises on visible body area  Musculoskeletal: No clubbing or cyanosis of digits. and Normocephalic. Extremitties: +++ peripheral edema, no deformities. Neurologic:Unable to obtain as intubated/sedated. Psychiatric: Unable to obtain as intubated/sedated.         DATA:  Diagnostic tests reviewed by me for today's visit:   (AS NEEDED FOR MY EVALUATION AND MANAGEMENT). Recent Labs     08/31/21 0448 09/01/21  0406 09/02/21  0530   WBC 14.7* 11.6* 9.9   HCT 21.4* 22.5* 21.8*    232 198     Iron Saturation:  No components found for: PERCENTFE  FERRITIN:    Lab Results   Component Value Date    FERRITIN 112.0 08/28/2021     IRON:    Lab Results   Component Value Date    IRON 22 08/28/2021     TIBC:    Lab Results   Component Value Date    TIBC 184 08/28/2021       Recent Labs     08/30/21  1530 08/31/21 0448 09/01/21  0406 09/02/21  0530   NA  --  140 142 140   K 3.4* 3.5 3.5 3.6   CL  --  110 110 109   CO2  --  15* 19* 18*   BUN  --  49* 31* 22*   CREATININE  --  5.8* 4.0* 3.3*     Recent Labs     08/31/21 0448 09/01/21  0406 09/02/21  0530   CALCIUM 7.1* 7.7* 8.1*   MG 1.80 1.90 2.00   PHOS 6.2* 4.4 3.9     No results for input(s): PH, PCO2, PO2 in the last 72 hours.     Invalid input(s): [place] [employer]    ABG:  No results found for: PH, PCO2, PO2, HCO3, BE, THGB, TCO2, O2SAT  VBG:    Lab Results   Component Value Date    PHVEN 7.375 09/02/2021    IRX2LEP 34.3 02/23/2021    BEVEN -4.6 02/23/2021    V9OBBUEI 100 02/23/2021       LDH:  No results found for: LDH  Uric Acid:  No results found for: LABURIC, URICACID    PT/INR:    Lab Results   Component Value Date    PROTIME 11.2 08/27/2021    INR 0.99 08/27/2021     Warfarin PT/INR:  No components found for: Luan Robertsting  PTT:    Lab Results   Component Value Date    APTT 34.4 09/02/2021   [APTT}  Last 3 Troponin:    Lab Results   Component Value Date    TROPONINI 0.23 08/28/2021    TROPONINI 0.22 08/27/2021    TROPONINI 0.24 08/27/2021       U/A:    Lab Results   Component Value Date    COLORU YELLOW 08/27/2021    PROTEINU >300 08/27/2021    PHUR 6.0 08/28/2021    WBCUA 7 08/27/2021    RBCUA 3 08/27/2021    CLARITYU Clear 08/27/2021    SPECGRAV 1.013 08/27/2021    LEUKOCYTESUR Negative 08/27/2021    UROBILINOGEN 0.2 08/27/2021 BILIRUBINUR Negative 08/27/2021    BLOODU SMALL 08/27/2021    GLUCOSEU 250 08/27/2021     Microalbumen/Creatinine ratio:  No components found for: RUCREAT  24 Hour Urine for Protein:  No components found for: RAWUPRO, UHRS3, DGRD19GM, UTV3  24 Hour Urine for Creatinine Clearance:  No components found for: CREAT4, UHRS10, UTV10  Urine Toxicology:  No components found for: Jocelyn Room, IBENZO, ICOCAINE, IMARTHC, IOPIATES, IPHENCYC    HgBA1c:    Lab Results   Component Value Date    LABA1C 5.7 08/27/2021     RPR:  No results found for: RPR  HIV:  No results found for: HIV  NILSA:    Lab Results   Component Value Date    NILSA Negative 04/25/2020     RF:  No results found for: RF  DSDNA:  No components found for: DNA  AMYLASE:  No results found for: AMYLASE  LIPASE:    Lab Results   Component Value Date    LIPASE 14.0 04/25/2020     Fibrinogen Level:  No components found for: FIB       BELOW MENTIONED RADIOLOGY STUDY RESULTS BY ME (AS NEEDED FOR MY EVALUATION AND MANAGEMENT). CT HEAD WO CONTRAST    Result Date: 8/27/2021  EXAMINATION: CT OF THE HEAD WITHOUT CONTRAST; CT OF THE CHEST WITHOUT CONTRAST  8/27/2021 7:12 pm TECHNIQUE: CT of the head was performed without the administration of intravenous contrast. Dose modulation, iterative reconstruction, and/or weight based adjustment of the mA/kV was utilized to reduce the radiation dose to as low as reasonably achievable.; CT of the chest was performed without the administration of intravenous contrast. Multiplanar reformatted images are provided for review. Dose modulation, iterative reconstruction, and/or weight based adjustment of the mA/kV was utilized to reduce the radiation dose to as low as reasonably achievable. COMPARISON: CT head 08/27/2020. HISTORY: ORDERING SYSTEM PROVIDED HISTORY: AMS TECHNOLOGIST PROVIDED HISTORY: Has a \"code stroke\" or \"stroke alert\" been called? ->No Reason for exam:->AMS Decision Support Exception - unselect if not a suspected or confirmed emergency medical condition->Emergency Medical Condition (MA) Is the patient pregnant?->No Reason for Exam: Respiratory Distress (Pt brought in per [facility] after call from  for resp distress. O2 sat 70's, BMV in route. Pt will open eyes to voice. Pupils 8mm and fixed. ) Acuity: Acute Type of Exam: Initial; ORDERING SYSTEM PROVIDED HISTORY: AMS, respiratory distress TECHNOLOGIST PROVIDED HISTORY: Reason for exam:->AMS, respiratory distress Is the patient pregnant?->No Reason for Exam: Respiratory Distress (Pt brought in per [facility] after call from  for resp distress. O2 sat 70's, BMV in route. Pt will open eyes to voice. Pupils 8mm and fixed. ) Acuity: Acute Type of Exam: Initial CT HEAD FINDINGS: BRAIN/VENTRICLES: No acute hemorrhage. Periventricular and subcortical hypoattenuation is nonspecific. Interval chronic lacunar infarcts in the left corona radiata, thalamus, and internal capsule. Artifact partially obscures the laureano. Artifact partially obscures the inferior cerebellum. Laura Bellis white differentiation appears maintained given artifact near the skull base and through the posterior fossa. Mild prominence of the ventricles noted. Overall ventricle size appears increased from prior exam.  There is no midline shift. Basal cisterns appear patent. ORBITS: Visualized orbits appear unremarkable on this unenhanced exam. SINUSES: Mild mucosal thickening of the ethmoid and sphenoid sinuses. Visualized mastoid air cells appear clear. SOFT TISSUES/SKULL: No depressed calvarial fracture. Fluid in the nasopharynx and oropharynx. CT HEAD IMPRESSION: No acute hemorrhage or midline shift. Increased ventricle size compared to prior exam.  Correlate with presentation to exclude acute hydrocephalus. Other findings as described. CT CHEST FINDINGS: Mediastinum: Heart is borderline enlarged. Trace pericardial effusion or thickening. Aorta is within normal limits in size.   Pulmonary arteries are within normal limits in size. . Lungs/pleura: Central airways are patent. Endotracheal tube with tip terminating in the distal 3rd subglottic trachea. Secretions noted in the trachea. Opacification of segmental and subsegmental bronchi. Ground-glass the confluent airspace disease. Interlobular septal thickening. Small to moderate pleural effusions. No pneumothorax. Soft Tissues/Bones: Suboptimal evaluation for adenopathy without intravenous contrast. Mediastinal adenopathy. Diffuse body wall edema. Scattered degenerative changes noted in the visualized spine without spondylolisthesis. Upper Abdomen: Limited images of the upper abdomen are unremarkable given lack of intravenous contrast. CT CHEST IMPRESSION: 1.   1. Multifocal airspace disease that likely represents a combination of aspiration/pneumonia and pulmonary edema. 2. Other findings as described. CT CHEST WO CONTRAST    Result Date: 8/27/2021  EXAMINATION: CT OF THE HEAD WITHOUT CONTRAST; CT OF THE CHEST WITHOUT CONTRAST  8/27/2021 7:12 pm TECHNIQUE: CT of the head was performed without the administration of intravenous contrast. Dose modulation, iterative reconstruction, and/or weight based adjustment of the mA/kV was utilized to reduce the radiation dose to as low as reasonably achievable.; CT of the chest was performed without the administration of intravenous contrast. Multiplanar reformatted images are provided for review. Dose modulation, iterative reconstruction, and/or weight based adjustment of the mA/kV was utilized to reduce the radiation dose to as low as reasonably achievable. COMPARISON: CT head 08/27/2020. HISTORY: ORDERING SYSTEM PROVIDED HISTORY: AMS TECHNOLOGIST PROVIDED HISTORY: Has a \"code stroke\" or \"stroke alert\" been called? ->No Reason for exam:->[facility] Decision Support Exception - unselect if not a suspected or confirmed emergency medical condition->Emergency Medical Condition (MA) Is the patient pregnant?->No Reason for Exam: Respiratory Distress (Pt brought in [facility] after call from  for resp distress. O2 sat 70's, BMV in route. Pt will open eyes to voice. Pupils 8mm and fixed. ) Acuity: Acute Type of Exam: Initial; ORDERING SYSTEM PROVIDED HISTORY: AMS, respiratory distress TECHNOLOGIST PROVIDED HISTORY: Reason for exam:->AMS, respiratory distress Is the patient pregnant?->No Reason for Exam: Respiratory Distress (Pt brought in [facility] after call from  for resp distress. O2 sat 70's, BMV in route. Pt will open eyes to voice. Pupils 8mm and fixed. ) Acuity: Acute Type of Exam: Initial CT HEAD FINDINGS: BRAIN/VENTRICLES: No acute hemorrhage. Periventricular and subcortical hypoattenuation is nonspecific. Interval chronic lacunar infarcts in the left corona radiata, thalamus, and internal capsule. Artifact partially obscures the laureano. Artifact partially obscures the inferior cerebellum. Starleen Pleasure white differentiation appears maintained given artifact near the skull base and through the posterior fossa. Mild prominence of the ventricles noted. Overall ventricle size appears increased from prior exam.  There is no midline shift. Basal cisterns appear patent. ORBITS: Visualized orbits appear unremarkable on this unenhanced exam. SINUSES: Mild mucosal thickening of the ethmoid and sphenoid sinuses. Visualized mastoid air cells appear clear. SOFT TISSUES/SKULL: No depressed calvarial fracture. Fluid in the nasopharynx and oropharynx. CT HEAD IMPRESSION: No acute hemorrhage or midline shift. Increased ventricle size compared to prior exam.  Correlate with presentation to exclude acute hydrocephalus. Other findings as described. CT CHEST FINDINGS: Mediastinum: Heart is borderline enlarged. Trace pericardial effusion or thickening. Aorta is within normal limits in size. Pulmonary arteries are within normal limits in size. . Lungs/pleura: Central airways are patent.   Endotracheal tube with tip terminating in the distal 3rd subglottic trachea. Secretions noted in the trachea. Opacification of segmental and subsegmental bronchi. Ground-glass the confluent airspace disease. Interlobular septal thickening. Small to moderate pleural effusions. No pneumothorax. Soft Tissues/Bones: Suboptimal evaluation for adenopathy without intravenous contrast. Mediastinal adenopathy. Diffuse body wall edema. Scattered degenerative changes noted in the visualized spine without spondylolisthesis. Upper Abdomen: Limited images of the upper abdomen are unremarkable given lack of intravenous contrast. CT CHEST IMPRESSION: 1.   1. Multifocal airspace disease that likely represents a combination of aspiration/pneumonia and pulmonary edema. 2. Other findings as described. XR CHEST PORTABLE    Result Date: 8/28/2021  EXAMINATION: ONE XRAY VIEW OF THE CHEST 8/28/2021 1:21 am COMPARISON: Chest x-ray dated 02/24/2021. Dana Steve HISTORY: ORDERING SYSTEM PROVIDED HISTORY: central line and OG placement TECHNOLOGIST PROVIDED HISTORY: Reason for exam:->central line and OG placement FINDINGS: LINES/TUBES/OTHER: Endotracheal tube is noted with its tip approximately 5 cm from the ana. Enteric tube is noted coursing below the level of the diaphragm and within the stomach. Left IJ central venous catheter is noted with its tip projecting over the area of the left brachiocephalic vein. HEART/MEDIASTINUM: The cardiac silhouette is mildly enlarged, but stable. PLEURA/LUNGS: There is perihilar fullness and increased interstitial markings which may reflect pulmonary vascular congestion in the correct clinical setting. There is left basilar reflecting airspace disease, atelectasis or pleural effusion. There is right basilar airspace disease. There is no appreciable pneumothorax. BONES/SOFT TISSUE: No acute abnormality. Endotracheal tube and enteric tube are in satisfactory position.  The left IJ central venous catheter tip projects over the area of the left brachiocephalic vein. Right basilar airspace disease. Left basilar opacification reflecting airspace disease, atelectasis or pleural effusion. Conclusions      Summary   *Left ventricle - moderate concentric LVH, normal size and function with EF   of 55%   *Mitral valve - mild regurgitation   *Tricuspid valve - trivial regurgitation   *Bubble study - negative      Signature      ------------------------------------------------------------------   Electronically signed by Camryn Huynh MD (Interpreting   physician) on 08/31/2020 at 02:48 PM   ------------------------------------------------------------------         Summary   Left ventricular systolic function is normal with ejection fraction   estimated at 55-60 %. No regional wall motion abnormalities are noted. There is mild left ventricular hypertrophy. Normal left ventricular diastolic filling pressure. Moderate mitral regurgitation. Mild pulmonic regurgitation present. IVC size is dilated (>2.1 cm) but collapses > 50% with respiration   consistent with elevated RA pressure (8 mmHg). **There is a small-moderate bilateral pleural effusion. **There is a small circumferential pericardial effusion noted.       Previous echo done 2020 - EF 55%      Signature      ------------------------------------------------------------------   Electronically signed by Mei Gerardo MD, Adriane Sanchez   (Interpreting physician) on 08/30/2021 at 04:21 PM   ------------------------------------------------------------------

## 2021-09-02 NOTE — PROGRESS NOTES
P Pulmonary and Critical Care  Progress note      Reason for Consult: Acute hypoxemic respiratory failure, pulmonary edema, acute on chronic kidney disease  Requesting Physician: Dr. Paolo Devlin:   [address] [facility] / [facility]:                The patient is a 55 y.o. female with significant past medical history of:      Diagnosis Date    Blind in both eyes     Cerebral artery occlusion with cerebral infarction ([address]     CHF (congestive heart failure) ([address]     Chronic kidney disease     Depression     Diabetes mellitus out of control ([address]     Diabetes mellitus, type II ([address]     2005    Diabetic neuropathy associated with type 2 diabetes mellitus ([address]     Generalized headaches     Hypertension     Infertility     Insomnia     chronic vs lack of time spent to sleep    Migraine headache 11/09/2011    Mixed hyperlipidemia     Otitis media     h/o recurrent    Pelvic abscess in female 10/05/2013    Pneumonia     2004 approx.  Stroke [facility]) 08/27/2020     Interval history: Patient failed her weaning trial after dialysis yesterday. May have been still too sedated. This morning remains on fentanyl, propofol and Precedex. .      Past Surgical History:        Procedure Laterality Date    CERVIX SURGERY      laser tx for dysplasia;1992    EYE SURGERY      FOOT SURGERY Right     FOOT SURGERY Bilateral     FOOT SURGERY Left      Current Medications:    Current Facility-Administered Medications: dexmedetomidine (PRECEDEX) 400 mcg in sodium chloride 0.9 % 100 mL infusion, 0.2-1.4 mcg/kg/hr, IntraVENous, Continuous  heparin (porcine) injection 5,000 Units, 5,000 Units, SubCUTAneous, 3 times per day  fentaNYL 10 mcg/mL infusion, 12.5-200 mcg/hr, IntraVENous, Continuous  albumin human 25 % IV solution 25 g, 25 g, IntraVENous, PRN  epoetin yohana-epbx (RETACRIT) injection 10,000 Units, 10,000 Units, SubCUTAneous, Every Other Day  heparin (porcine) injection 2,400 Units, 2,400 Units, IntraCATHeter, PRN  furosemide (LASIX) injection 40 mg, 40 mg, IntraVENous, TID  insulin lispro (1 Unit Dial) 0-6 Units, 0-6 Units, SubCUTAneous, Q4H  glucose (GLUTOSE) 40 % oral gel 15 g, 15 g, Oral, PRN  dextrose 50 % IV solution, 12.5 g, IntraVENous, PRN  glucagon (rDNA) injection 1 mg, 1 mg, IntraMUSCular, PRN  dextrose 5 % solution, 100 mL/hr, IntraVENous, PRN  pantoprazole (PROTONIX) injection 40 mg, 40 mg, IntraVENous, BID  propofol injection, 5-50 mcg/kg/min, IntraVENous, Titrated  fentaNYL (SUBLIMAZE) injection 50 mcg, 50 mcg, IntraVENous, Q1H PRN  sodium chloride flush 0.9 % injection 5-40 mL, 5-40 mL, IntraVENous, 2 times per day  sodium chloride flush 0.9 % injection 5-40 mL, 5-40 mL, IntraVENous, PRN  0.9 % sodium chloride infusion, 25 mL, IntraVENous, PRN  ondansetron (ZOFRAN-ODT) disintegrating tablet 4 mg, 4 mg, Oral, Q8H PRN **OR** ondansetron (ZOFRAN) injection 4 mg, 4 mg, IntraVENous, Q6H PRN  polyethylene glycol (GLYCOLAX) packet 17 g, 17 g, Oral, Daily PRN  acetaminophen (TYLENOL) tablet 650 mg, 650 mg, Oral, Q6H PRN **OR** acetaminophen (TYLENOL) suppository 650 mg, 650 mg, Rectal, Q6H PRN  hydrALAZINE (APRESOLINE) injection 10 mg, 10 mg, IntraVENous, Q4H PRN    Allergies   Allergen Reactions    Amoxicillin Itching and Hives     Tolerates cephalosporins  Patient tolerating cefazolin (ANCEF) as of October 11, 2018  Patient tolerating cefazolin (ANCEF) as of October 11, 2018  Tolerates cephalosporins  Patient tolerating cefazolin (ANCEF) as of October 11, 2018    Levofloxacin Anaphylaxis    Levofloxacin Anaphylaxis    Vancomycin Shortness Of Breath, Hives and Anaphylaxis    Tape [Adhesive Tape] Other (See Comments)     Paper tape turns skin bright red. Plastic tape okay. Social History:    TOBACCO:   reports that she has been smoking cigarettes. She has been smoking about 1.00 pack per day. She has never used smokeless tobacco.  ETOH:   reports no history of alcohol use.   Patient currently lives independently  Environmental/chemical exposure: None known    Family History:       Problem Relation Age of Onset    Diabetes Mother    [facility] Other Mother 79        Covid    Diabetes Father     High Blood Pressure Father     Colon Cancer Father     Diabetes Sister     Alcohol Abuse Maternal Grandfather     Diabetes Paternal Grandmother     Alcohol Abuse Paternal Grandfather     Diabetes Paternal Aunt     Diabetes Paternal Uncle      REVIEW OF SYSTEMS:    AMIE is unobtainable due to his critical illness. Objective:   PHYSICAL EXAM:      VITALS:  BP (!) 149/59   Pulse 77   Temp 98.7 °F (37.1 °C) (Temporal)   Resp 16   Ht 5' 6\" (1.676 m)   Wt 209 lb 10.5 oz (95.1 kg)   SpO2 96%   BMI 33.84 kg/m²      24HR INTAKE/OUTPUT:      Intake/Output Summary (Last 24 hours) at 9/2/2021 0948  Last data filed at 9/2/2021 0908  Gross per 24 hour   Intake 984.43 ml   Output 2527 ml   Net -1542.57 ml     CONSTITUTIONAL: Sedated on mechanical ventilation  NECK:  Supple, symmetrical, trachea midline, no adenopathy, thyroid symmetric, not enlarged and no tenderness, skin normal  LUNGS:  no increased work of breathing and crackles to auscultation. No accessory muscle use  CARDIOVASCULAR: S1 and S2, no edema and no JVD  ABDOMEN:  normal bowel sounds, non-distended and no masses palpated, and no tenderness to palpation. No hepatospleenomegaly  LYMPHADENOPATHY:  no axillary or supraclavicular adenopathy. No cervical adnenopathy  PSYCHIATRIC: Sedated on mechanical ventilation MUSCULOSKELETAL: No obvious misalignment or effusion of the joints. No clubbing, cyanosis of the digits. SKIN:  normal skin color, texture, turgor and no redness, warmth, or swelling.  No palpable nodules    DATA:    Old records have been reviewed    CBC:  Recent Labs     08/31/21  0448 09/01/21  0406 09/02/21  0530   WBC 14.7* 11.6* 9.9   RBC 2.43* 2.57* 2.48*   HGB 7.0* 7.4* 7.4*   HCT 21.4* 22.5* 21.8*    232 198   MCV 88.2 87.5 87.8   MCH 29.0 28.9 29.9   MCHC 32.9 33.0 34.0   RDW 16.3* 16.2* 16.1*      BMP:  Recent Labs     08/31/21  0448 09/01/21  0406 09/02/21  0530    142 140   K 3.5 3.5 3.6    110 109   CO2 15* 19* 18*   BUN 49* 31* 22*   CREATININE 5.8* 4.0* 3.3*   CALCIUM 7.1* 7.7* 8.1*   GLUCOSE 90 105* 110*      ABG:  Recent Labs     08/30/21  1055   PHART 7.309*   [id number] 31.9*   PO2ART 92.7   GSS1QGU 16.0*   [id number] 97.7   BEART -9.4*     Procalcitonin  Recent Labs     09/01/21  1057   PROCAL 0.68*       No results found for: BNP  Lab Results   Component Value Date    CKTOTAL 43 09/02/2021    TROPONINI 0.23 (H) 08/28/2021           Radiology Review:  All pertinent images / reports were reviewed as a part of this visit. Assessment:     1. Acute hypoxemic respiratory failure  2. Acute on chronic kidney disease  3. Acute pulmonary edema      Plan:     I have reviewed laboratories, medical records and images for this visit  Repeat chest x-ray today  Patient has had dialysis with fluid removal.  Remains somewhat edematous  May benefit from additional dialysis and fluid removal  Has been unsuccessful in weaning from mechanical ventilation      She does have a leukocytosis and pro calcitonin is 0.86  She is growing staph epidermidis from her blood. This is likely contaminant  Otherwise cultures are negative. MRSA nasal screen is negative  Off of Zyvox  Give a total of 5 days cefepime  Repeat procalcitonin is 0.68. Remains on AC/, respiratory rate 14 and FiO2 0.3 with PEEP 5  Chest x-ray for today does show some improvement but persisting pulmonary edema and small pleural effusions. Plan for additional dialysis and fluid removal today. She failed SBT yesterday  Try to stop Propofol and continue Precedex  Repeat SBT later today. Patient did have echocardiogram showing good LV function and no significant diastolic dysfunction.     Total critical care time caring for this patient with life threatening illness, including

## 2021-09-02 NOTE — PROGRESS NOTES
09/01/21 2030   Vent Patient Data   Plateau Pressure 22 [id number]   Static Compliance 34 mL/cmH2O   Dynamic Compliance 25 mL/cmH2O

## 2021-09-03 ENCOUNTER — APPOINTMENT (OUTPATIENT)
Dept: GENERAL RADIOLOGY | Age: 46
DRG: 720 | End: 2021-09-03
Payer: COMMERCIAL

## 2021-09-03 LAB
A/G RATIO: 0.8 (ref 1.1–2.2)
ALBUMIN SERPL-MCNC: 2.6 G/DL (ref 3.4–5)
ALP BLD-CCNC: 127 U/L (ref 40–129)
ALT SERPL-CCNC: 8 U/L (ref 10–40)
ANION GAP SERPL CALCULATED.3IONS-SCNC: 15 MMOL/L (ref 3–16)
APTT: 40.3 SEC (ref 26.2–38.6)
AST SERPL-CCNC: 13 U/L (ref 15–37)
BASOPHILS ABSOLUTE: 0.1 K/UL (ref 0–0.2)
BASOPHILS RELATIVE PERCENT: 0.4 %
BILIRUB SERPL-MCNC: <0.2 MG/DL (ref 0–1)
BUN BLDV-MCNC: 16 MG/DL (ref 7–20)
CALCIUM IONIZED: 1.16 MMOL/L (ref 1.12–1.32)
CALCIUM SERPL-MCNC: 8.5 MG/DL (ref 8.3–10.6)
CHLORIDE BLD-SCNC: 110 MMOL/L (ref 99–110)
CO2: 17 MMOL/L (ref 21–32)
CREAT SERPL-MCNC: 2.8 MG/DL (ref 0.6–1.1)
EOSINOPHILS ABSOLUTE: 0.6 K/UL (ref 0–0.6)
EOSINOPHILS RELATIVE PERCENT: 3.7 %
GFR AFRICAN AMERICAN: 22
GFR NON-AFRICAN AMERICAN: 18
GLOBULIN: 3.3 G/DL
GLUCOSE BLD-MCNC: 100 MG/DL (ref 70–99)
GLUCOSE BLD-MCNC: 113 MG/DL (ref 70–99)
GLUCOSE BLD-MCNC: 125 MG/DL (ref 70–99)
GLUCOSE BLD-MCNC: 126 MG/DL (ref 70–99)
GLUCOSE BLD-MCNC: 130 MG/DL (ref 70–99)
GLUCOSE BLD-MCNC: 176 MG/DL (ref 70–99)
HCT VFR BLD CALC: 24.6 % (ref 36–48)
HEMATOLOGY PATH CONSULT: NORMAL
HEMATOLOGY PATH CONSULT: YES
HEMOGLOBIN: 7.9 G/DL (ref 12–16)
LACTIC ACID: 0.5 MMOL/L (ref 0.4–2)
LYMPHOCYTES ABSOLUTE: 3.2 K/UL (ref 1–5.1)
LYMPHOCYTES RELATIVE PERCENT: 19.9 %
MAGNESIUM: 2 MG/DL (ref 1.8–2.4)
MCH RBC QN AUTO: 28.6 PG (ref 26–34)
MCHC RBC AUTO-ENTMCNC: 32.2 G/DL (ref 31–36)
MCV RBC AUTO: 88.7 FL (ref 80–100)
MONOCYTES ABSOLUTE: 1.8 K/UL (ref 0–1.3)
MONOCYTES RELATIVE PERCENT: 11.3 %
NEUTROPHILS ABSOLUTE: 10.5 K/UL (ref 1.7–7.7)
NEUTROPHILS RELATIVE PERCENT: 64.7 %
PDW BLD-RTO: 15.9 % (ref 12.4–15.4)
PERFORMED ON: ABNORMAL
PH VENOUS: 7.33 (ref 7.35–7.45)
PHOSPHORUS: 3.4 MG/DL (ref 2.5–4.9)
PLATELET # BLD: 191 K/UL (ref 135–450)
PMV BLD AUTO: 9 FL (ref 5–10.5)
POTASSIUM SERPL-SCNC: 4 MMOL/L (ref 3.5–5.1)
PROCALCITONIN: 0.87 NG/ML (ref 0–0.15)
RBC # BLD: 2.77 M/UL (ref 4–5.2)
SODIUM BLD-SCNC: 142 MMOL/L (ref 136–145)
TOTAL CK: 56 U/L (ref 26–192)
TOTAL PROTEIN: 5.9 G/DL (ref 6.4–8.2)
WBC # BLD: 16.2 K/UL (ref 4–11)

## 2021-09-03 PROCEDURE — 2500000003 HC RX 250 WO HCPCS: Performed by: INTERNAL MEDICINE

## 2021-09-03 PROCEDURE — 94761 N-INVAS EAR/PLS OXIMETRY MLT: CPT

## 2021-09-03 PROCEDURE — 2580000003 HC RX 258: Performed by: INTERNAL MEDICINE

## 2021-09-03 PROCEDURE — 6360000002 HC RX W HCPCS: Performed by: INTERNAL MEDICINE

## 2021-09-03 PROCEDURE — 82330 ASSAY OF CALCIUM: CPT

## 2021-09-03 PROCEDURE — C9113 INJ PANTOPRAZOLE SODIUM, VIA: HCPCS | Performed by: INTERNAL MEDICINE

## 2021-09-03 PROCEDURE — 90935 HEMODIALYSIS ONE EVALUATION: CPT

## 2021-09-03 PROCEDURE — 6370000000 HC RX 637 (ALT 250 FOR IP): Performed by: INTERNAL MEDICINE

## 2021-09-03 PROCEDURE — 85025 COMPLETE CBC W/AUTO DIFF WBC: CPT

## 2021-09-03 PROCEDURE — 83735 ASSAY OF MAGNESIUM: CPT

## 2021-09-03 PROCEDURE — 2000000000 HC ICU R&B

## 2021-09-03 PROCEDURE — 94003 VENT MGMT INPAT SUBQ DAY: CPT

## 2021-09-03 PROCEDURE — 85730 THROMBOPLASTIN TIME PARTIAL: CPT

## 2021-09-03 PROCEDURE — 84145 PROCALCITONIN (PCT): CPT

## 2021-09-03 PROCEDURE — 99291 CRITICAL CARE FIRST HOUR: CPT | Performed by: INTERNAL MEDICINE

## 2021-09-03 PROCEDURE — 99233 SBSQ HOSP IP/OBS HIGH 50: CPT | Performed by: INTERNAL MEDICINE

## 2021-09-03 PROCEDURE — 71045 X-RAY EXAM CHEST 1 VIEW: CPT

## 2021-09-03 PROCEDURE — 82550 ASSAY OF CK (CPK): CPT

## 2021-09-03 PROCEDURE — 2700000000 HC OXYGEN THERAPY PER DAY

## 2021-09-03 PROCEDURE — 6360000002 HC RX W HCPCS: Performed by: EMERGENCY MEDICINE

## 2021-09-03 PROCEDURE — 80053 COMPREHEN METABOLIC PANEL: CPT

## 2021-09-03 PROCEDURE — 84100 ASSAY OF PHOSPHORUS: CPT

## 2021-09-03 PROCEDURE — 36415 COLL VENOUS BLD VENIPUNCTURE: CPT

## 2021-09-03 PROCEDURE — 83605 ASSAY OF LACTIC ACID: CPT

## 2021-09-03 RX ORDER — LABETALOL HYDROCHLORIDE 5 MG/ML
10 INJECTION, SOLUTION INTRAVENOUS EVERY 4 HOURS PRN
Status: DISCONTINUED | OUTPATIENT
Start: 2021-09-03 | End: 2021-09-06

## 2021-09-03 RX ORDER — CLONIDINE 0.2 MG/24H
1 PATCH, EXTENDED RELEASE TRANSDERMAL WEEKLY
Status: DISCONTINUED | OUTPATIENT
Start: 2021-09-03 | End: 2021-09-13 | Stop reason: HOSPADM

## 2021-09-03 RX ORDER — METHYLPREDNISOLONE SODIUM SUCCINATE 40 MG/ML
40 INJECTION, POWDER, LYOPHILIZED, FOR SOLUTION INTRAMUSCULAR; INTRAVENOUS EVERY 6 HOURS
Status: DISCONTINUED | OUTPATIENT
Start: 2021-09-03 | End: 2021-09-04

## 2021-09-03 RX ADMIN — DEXMEDETOMIDINE HYDROCHLORIDE 0.3 MCG/KG/HR: 4 INJECTION, SOLUTION INTRAVENOUS at 11:30

## 2021-09-03 RX ADMIN — METHYLPREDNISOLONE SODIUM SUCCINATE 40 MG: 40 INJECTION, POWDER, FOR SOLUTION INTRAMUSCULAR; INTRAVENOUS at 18:37

## 2021-09-03 RX ADMIN — METHYLPREDNISOLONE SODIUM SUCCINATE 40 MG: 40 INJECTION, POWDER, FOR SOLUTION INTRAMUSCULAR; INTRAVENOUS at 23:58

## 2021-09-03 RX ADMIN — METHYLPREDNISOLONE SODIUM SUCCINATE 40 MG: 40 INJECTION, POWDER, FOR SOLUTION INTRAMUSCULAR; INTRAVENOUS at 12:32

## 2021-09-03 RX ADMIN — FUROSEMIDE 40 MG: 10 INJECTION, SOLUTION INTRAMUSCULAR; INTRAVENOUS at 13:37

## 2021-09-03 RX ADMIN — HEPARIN SODIUM 5000 UNITS: 5000 INJECTION INTRAVENOUS; SUBCUTANEOUS at 10:03

## 2021-09-03 RX ADMIN — PROPOFOL 15 MCG/KG/MIN: 10 INJECTION, EMULSION INTRAVENOUS at 03:06

## 2021-09-03 RX ADMIN — FUROSEMIDE 40 MG: 10 INJECTION, SOLUTION INTRAMUSCULAR; INTRAVENOUS at 20:43

## 2021-09-03 RX ADMIN — Medication 45 MCG/HR: at 11:58

## 2021-09-03 RX ADMIN — PANTOPRAZOLE SODIUM 40 MG: 40 INJECTION, POWDER, FOR SOLUTION INTRAVENOUS at 20:43

## 2021-09-03 RX ADMIN — HEPARIN SODIUM 5000 UNITS: 5000 INJECTION INTRAVENOUS; SUBCUTANEOUS at 22:04

## 2021-09-03 RX ADMIN — Medication 10 ML: at 10:04

## 2021-09-03 RX ADMIN — PROPOFOL 25 MCG/KG/MIN: 10 INJECTION, EMULSION INTRAVENOUS at 21:30

## 2021-09-03 RX ADMIN — FUROSEMIDE 40 MG: 10 INJECTION, SOLUTION INTRAMUSCULAR; INTRAVENOUS at 10:03

## 2021-09-03 RX ADMIN — DEXMEDETOMIDINE HYDROCHLORIDE 0.5 MCG/KG/HR: 4 INJECTION, SOLUTION INTRAVENOUS at 21:28

## 2021-09-03 RX ADMIN — Medication 10 ML: at 20:43

## 2021-09-03 RX ADMIN — INSULIN LISPRO 1 UNITS: 100 INJECTION, SOLUTION INTRAVENOUS; SUBCUTANEOUS at 20:40

## 2021-09-03 RX ADMIN — HEPARIN SODIUM 5000 UNITS: 5000 INJECTION INTRAVENOUS; SUBCUTANEOUS at 13:37

## 2021-09-03 RX ADMIN — PANTOPRAZOLE SODIUM 40 MG: 40 INJECTION, POWDER, FOR SOLUTION INTRAVENOUS at 10:03

## 2021-09-03 RX ADMIN — PROPOFOL 15 MCG/KG/MIN: 10 INJECTION, EMULSION INTRAVENOUS at 16:08

## 2021-09-03 RX ADMIN — INSULIN LISPRO 1 UNITS: 100 INJECTION, SOLUTION INTRAVENOUS; SUBCUTANEOUS at 23:57

## 2021-09-03 ASSESSMENT — PAIN SCALES - GENERAL
PAINLEVEL_OUTOF10: 0

## 2021-09-03 ASSESSMENT — PULMONARY FUNCTION TESTS
PIF_VALUE: 26
PIF_VALUE: 15
PIF_VALUE: 15
PIF_VALUE: 23
PIF_VALUE: 26
PIF_VALUE: 33
PIF_VALUE: 15
PIF_VALUE: 15
PIF_VALUE: 28
PIF_VALUE: 17

## 2021-09-03 NOTE — PROGRESS NOTES
Brendon Saris, MD Lou Ahumada, MD Serina Heath, MD               Office: [phone]                      Fax: [phone]             [facility]                   NEPHROLOGY CVU INPATIENT PROGRESS NOTE:     PATIENT NAME: Gay Jay  : 1975  MRN: [id number]       RECOMMENDATIONS:     - F/UP CXR today   - dialysis today- but UF only today-  - next HD - full on Saturday     - Needs a long term access, eventually prefered [facility] w/ h/o advanced CKD, likely will need long-term   -consult IR on Monday for [facility] placement     - continue Lasix too- Rx: IV lasix  40- TID - as making urine too   -Monitor acidosis,  -Hold home dose of aldactone, lisinopril,  -PNA Rx w/ iv abx    -BP is improving w/ hydralazine, use PRN for >150,  -vent mx per [facility]  - at higher risk for decompensation, needing closer monitoring. D/C plan from renal stand point:  - unclear, as critically ill     Have Discussed with pt's  in details. Discussed with patient's treatment team-  CVU nurse, hospitalist-Dr. Filomena Mondragon      IMPRESSION:       Admitted for:  Acute respiratory failure ([address] [J96.00]  Encephalopathy [G93.40]  Respiratory failure with hypoxia, unspecified chronicity ([address] [J96.91]  Pneumonia due to infectious organism, unspecified laterality, unspecified part of lung [J18.9]      KINZA (on proteinuric CKD: 4):  Worsening  - BL Scr-last known 2.5, in 2021,   ---> 4.6 on admission  -: Etiology of KINZA -presumed ATN in the setting of pneumonia, unclear if it this is advancing CKD    -appreciated assistance by Dr Yoon Sepulveda for Rt IJ non-TDC placement ()   -Hemodialysis #1 2021      History of nephrotic range proteinuria,   - thought to be from diabetic nephropathy With CKD stage III -> so high risk of advanced CKD,  -Follows with Dr. Cheri Chavez  -Noncompliance, last follow-up was 2021 then lost for follow-up  -echo results -  IVC size is dilated (>2.1 cm) but collapses > 50% with respiration consistent with elevated RA pressure (8 mmHg). -Last echo-8/2020  moderate concentric LVH, normal size and function with EF of 58%, normal diastolic function      Associated problems:   Hypokalemia-needing repletion  Acidosis, non-anion gap-  Hypomagnesemia-  Hypophosphatemia  Anemia, chronic disease-worsening        Other major problems: Management per primary and other consulting teams.   //Acute hypoxic respiratory failure -needing intubation  // Multifocal airspace disease that likely represents a combination of aspiration/pneumonia and pulmonary edema  //Fluid overload  -COVID was ruled out  //History of uncontrolled diabetes last A1c was 11.3    // Hypertension un-controlled,       Hospital Problems         Last Modified POA    Pulmonary edema 8/28/2021 Yes    Chronic kidney disease (CKD), stage III (moderate) (HCC) 8/28/2021 Yes    Chronic diastolic (congestive) heart failure ([facility] 8/28/2021 Yes    Acute on chronic diastolic heart failure ([facility] 8/28/2021 Yes    Acute respiratory failure ([facility] 8/27/2021 Yes        : other supportive care :   - Check daily renal function panel with electrolytes-phosphorus  - Strict monitoring of I/Os, daily weight  - Renal feeds/diet  - Current medications reviewed. - Nephrotoxic medications have been discontinued. - Dose adjusted and appropriate. - Dose meds for eGFR <15 mL/min/1.73m2 during KINZA    - Avoid heavy opioids due to renal failure - may use very low dose dilaudid / fentanyl with close monitoring of CNS and respiratory depression. Please refer to the orders. High Complexity. Multiple complex problems. Discussed with patient 's treatment team- ICU team, nurse     Thank you for allowing me to participate in this patient's care. Please do not hesitate to contact me anytime. We will follow along with you.        Rick Sherman MD,  [facility] [address] [place] [address]  Office: [phone] or [address]  Fax: [phone] Severally ill, at risk of impending organ failure needing ICU higher level of care/monitoring. Time spent  35 minutes that included face-to-face meeting/discussion with patient's treatment team (including primary/referring team and other consultants; included coordination of care with the treatment team; and review of patient's electronic medical records and ordering appropriates tests.         ========================================================   ========================================================   Subjective:   Patient currently still  ill, remains in ICU as remains intubated sedated,   HD tolerating ok so far,   UOP (+) noted 1.2L   Past medical, Surgical, Social, Family medical history reviewed by me. MEDICATIONS: reviewed by me. Medications Prior to Admission:  No current facility-administered medications on file prior to encounter.      Current Outpatient Medications on File Prior to Encounter   Medication Sig Dispense Refill    Dulaglutide 0.75 MG/0.5ML SOPN Inject 0.75 mg into the skin once a week 4 pen 1    ibuprofen (ADVIL;MOTRIN) 200 MG CAPS Take 1 capsule by mouth      furosemide (LASIX) 80 MG tablet Take 80 mg by mouth every morning 80mg in the morning 40mg in the evening      amLODIPine (NORVASC) 10 MG tablet Take 1 tablet by mouth daily 30 tablet 1    furosemide (LASIX) 40 MG tablet Take 1 tablet by mouth every evening 30 tablet 1    spironolactone (ALDACTONE) 50 MG tablet Take 1 tablet by mouth daily 30 tablet 2    insulin glargine (LANTUS;BASAGLAR) 100 UNIT/ML injection pen Inject 30 Units into the skin daily 4 pen 2    lisinopril (PRINIVIL;ZESTRIL) 40 MG tablet Take 1 tablet by mouth daily 30 tablet 2    atorvastatin (LIPITOR) 40 MG tablet Take 1 tablet by mouth nightly 30 tablet 3    Insulin Pen Needle 29G X 12.7MM MISC 1 each by Does not apply route daily 100 each 5    propranolol (INDERAL LA) 80 MG extended release capsule Take 1 capsule by mouth every morning 30 capsule 2    LORazepam (ATIVAN) 0.5 MG tablet Take 0.5 mg by mouth 2 times daily as needed for Anxiety.  aspirin 81 MG EC tablet Take 1 tablet by mouth daily 30 tablet 3    pregabalin (LYRICA) 75 MG capsule Take 1 capsule by mouth nightly for 7 days. 7 capsule 0    sertraline (ZOLOFT) 50 MG tablet Take 1 tablet by mouth daily 30 tablet 3    glucose monitoring kit (FREESTYLE) monitoring kit 1 kit by Does not apply route daily 1 kit 0    insulin lispro, 1 Unit Dial, 100 UNIT/ML SOPN Inject 0-6 Units into the skin 3 times daily (with meals) **Corrective Low Dose Algorithm**  Glucose: Dose:               No Insulin  140-199 1 Unit  200-249 2 Units  250-299 3 Units  300-349 4 Units  350-399 5 Units  Over 399 6 Units (Patient taking differently: Inject 6-12 Units into the skin 3 times daily (with meals) **Corrective Low Dose Algorithm**  Glucose: Dose:               No Insulin  140-199 1 Unit  200-249 2 Units  250-299 3 Units  300-349 4 Units  350-399 5 Units  Over 399 6 Units) 3 pen 0    glucose blood VI test strips (VICTORY AGM-4000 TEST) strip Test four times daily until controlled and then three times daily.   Code 250.02  ONE TOUCH ULTRA MONITOR 100 strip 12         Current Facility-Administered Medications:     dexmedetomidine (PRECEDEX) 400 mcg in sodium chloride 0.9 % 100 mL infusion, 0.2-1.4 mcg/kg/hr, IntraVENous, Continuous, Cici Christina MD, Last Rate: 7.3 mL/hr at 09/03/21 0809, 0.3 mcg/kg/hr at 09/03/21 0809    heparin (porcine) injection 5,000 Units, 5,000 Units, SubCUTAneous, 3 times per day, Cici Christina MD, 5,000 Units at 09/02/21 2015    fentaNYL 10 mcg/mL infusion, 12.5-200 mcg/hr, IntraVENous, Continuous, Cici Christina MD, Last Rate: 4.5 mL/hr at 09/03/21 0810, 45 mcg/hr at 09/03/21 0810    albumin human 25 % IV solution 25 g, 25 g, IntraVENous, PRN, Erica Cárdenas MD, Stopped at 09/01/21 1439    epoetin yohana-epbx (RETACRIT) injection 10,000 Units, 10,000 Units, SubCUTAneous, Every Other Day, Kacey Shaver MD, 10,000 Units at 09/01/21 1517    heparin (porcine) injection 2,400 Units, 2,400 Units, IntraCATHeter, PRN, Kacey Shaver MD    furosemide (LASIX) injection 40 mg, 40 mg, IntraVENous, TID, Kacey Shaver MD, 40 mg at 09/02/21 2015    insulin lispro (1 Unit Dial) 0-6 Units, 0-6 Units, SubCUTAneous, Q4H, Brittany Dejesus DO    glucose (GLUTOSE) 40 % oral gel 15 g, 15 g, Oral, PRN, Brittany Dejesus, DO    dextrose 50 % IV solution, 12.5 g, IntraVENous, PRN, Brittany Dejesus DO    glucagon (rDNA) injection 1 mg, 1 mg, IntraMUSCular, PRN, Brittany Dejesus, DO    dextrose 5 % solution, 100 mL/hr, IntraVENous, PRN, Brittany Dejesus, DO    pantoprazole (PROTONIX) injection 40 mg, 40 mg, IntraVENous, BID, Jorge Griffin MD, 40 mg at 09/02/21 2015    propofol injection, 5-50 mcg/kg/min, IntraVENous, Titrated, Leni Andrade MD, Last Rate: 6.7 mL/hr at 09/03/21 0845, 10 mcg/kg/min at 09/03/21 0845    fentaNYL (SUBLIMAZE) injection 50 mcg, 50 mcg, IntraVENous, Q1H PRN, Leni Andrade MD    sodium chloride flush 0.9 % injection 5-40 mL, 5-40 mL, IntraVENous, 2 times per day, Brittany Dejesus DO, 10 mL at 09/02/21 2015    sodium chloride flush 0.9 % injection 5-40 mL, 5-40 mL, IntraVENous, PRN, Brittany Dejesus DO, 10 mL at 08/31/21 1532    0.9 % sodium chloride infusion, 25 mL, IntraVENous, PRN, Germán Dejesus DO, Stopped at 08/29/21 2301    ondansetron (ZOFRAN-ODT) disintegrating tablet 4 mg, 4 mg, Oral, Q8H PRN **OR** ondansetron (ZOFRAN) injection 4 mg, 4 mg, IntraVENous, Q6H PRN, Brittany Dejesus DO    polyethylene glycol (GLYCOLAX) packet 17 g, 17 g, Oral, Daily PRN, Talay Dejesus DO    acetaminophen (TYLENOL) tablet 650 mg, 650 mg, Oral, Q6H PRN **OR** acetaminophen (TYLENOL) suppository 650 mg, 650 mg, Rectal, Q6H PRN, Brittany Dejesus DO, 650 mg at 09/02/21 1648    hydrALAZINE (APRESOLINE) injection 10 mg, 10 mg, IntraVENous, Q4H PRN, Germán Ventura 16 96 % -- --   09/02/21 2345 (!) 151/55 -- -- 90 16 96 % -- --   09/02/21 2330 (!) 155/55 -- -- 91 16 96 % -- --   09/02/21 2315 (!) 146/55 -- -- 90 16 96 % -- --   09/02/21 2300 (!) 149/56 -- -- 94 16 96 % -- --   09/02/21 2245 (!) 158/58 -- -- 96 16 96 % -- --   09/02/21 2230 (!) 148/55 -- -- 94 16 96 % -- --   09/02/21 2215 (!) 152/58 -- -- 98 16 96 % -- --   09/02/21 2200 (!) 165/61 -- -- 102 17 96 % -- --   09/02/21 2145 (!) 144/55 -- -- 95 16 96 % -- --   09/02/21 2130 (!) 162/60 -- -- 103 17 96 % -- --   09/02/21 2115 (!) 146/56 -- -- 93 16 96 % -- --   09/02/21 2100 (!) 147/56 -- -- 93 16 96 % -- --   09/02/21 2045 (!) 144/56 -- -- 93 16 95 % -- --   09/02/21 2030 (!) 148/56 -- -- 94 16 95 % -- --   09/02/21 2024 -- -- -- 96 16 96 % -- --   09/02/21 2015 (!) 177/60 -- -- 101 18 96 % -- --   09/02/21 2000 (!) 136/55 100.5 °F (38.1 °C) Bladder 91 16 95 % -- --   09/02/21 1945 (!) 154/55 -- -- 95 16 95 % -- --   09/02/21 1930 (!) 150/58 -- -- 95 16 95 % -- --   09/02/21 1615 (!) 158/61 100.5 °F (38.1 °C) CORE 94 16 97 % -- --   09/02/21 1538 -- -- -- 98 -- -- -- --   09/02/21 1515 122/60 -- -- 98 20 98 % -- --   09/02/21 1215 (!) 150/58 -- -- 95 16 95 % -- --   09/02/21 1200 (!) 160/63 99.5 °F (37.5 °C) CORE 95 16 95 % -- --       Intake/Output Summary (Last 24 hours) at 9/3/2021 0858  Last data filed at 9/3/2021 0600  Gross per 24 hour   Intake 1121.92 ml   Output 3895 ml   Net -2773.08 ml          Constitutional: Poorly responsive, Intubated and  obese habitus  Eyes: Conjunctiva clear and Noscleral icterus  Ear, Nose, and Throat: moist oral mucosa; ET to vent  Neck: Trachea midline,  No jugular venous distension  Cardiovascular: Regular rate and ryhthm, normal S1 and S2,    Respiratory: Mechanically ventilated; Lung sounds notable for synchronous vent-associated breath sounds  Abdomen:  distended. Normal bowel sounds.    : Meza catheter is inserted, draining urine  Skin: no rash,  bruises on visible body area  Musculoskeletal: No clubbing or cyanosis of digits. and Normocephalic. Extremitties: +++ peripheral edema, no deformities. Neurologic:Unable to obtain as intubated/sedated. Psychiatric: Unable to obtain as intubated/sedated. DATA:  Diagnostic tests reviewed by me for today's visit:   (AS NEEDED FOR MY EVALUATION AND MANAGEMENT). Recent Labs     09/01/21  0406 09/02/21  0530 09/03/21  0356   WBC 11.6* 9.9 16.2*   HCT 22.5* 21.8* 24.6*    198 191     Iron Saturation:  No components found for: PERCENTFE  FERRITIN:    Lab Results   Component Value Date    FERRITIN 112.0 08/28/2021     IRON:    Lab Results   Component Value Date    IRON 22 08/28/2021     TIBC:    Lab Results   Component Value Date    TIBC 184 08/28/2021       Recent Labs     09/01/21  0406 09/02/21  0530 09/03/21  0356    140 142   K 3.5 3.6 4.0    109 110   CO2 19* 18* 17*   BUN 31* 22* 16   CREATININE 4.0* 3.3* 2.8*     Recent Labs     09/01/21  0406 09/02/21  0530 09/03/21  0356   CALCIUM 7.7* 8.1* 8.5   MG 1.90 2.00 2.00   PHOS 4.4 3.9 3.4     No results for input(s): PH, PCO2, PO2 in the last 72 hours.     Invalid input(s): [employer] [employer]    ABG:  No results found for: PH, PCO2, PO2, HCO3, BE, THGB, TCO2, O2SAT  VBG:    Lab Results   Component Value Date    PHVEN 7.333 09/03/2021    FQF3YNH 34.3 02/23/2021    BEVEN -4.6 02/23/2021    V4CQPESB 100 02/23/2021       LDH:  No results found for: LDH  Uric Acid:  No results found for: LABURIC, URICACID    PT/INR:    Lab Results   Component Value Date    PROTIME 11.2 08/27/2021    INR 0.99 08/27/2021     Warfarin PT/INR:  No components found for: Luan Nye  PTT:    Lab Results   Component Value Date    APTT 40.3 09/03/2021   [APTT}  Last 3 Troponin:    Lab Results   Component Value Date    TROPONINI 0.23 08/28/2021    TROPONINI 0.22 08/27/2021    TROPONINI 0.24 08/27/2021       U/A:    Lab Results   Component Value Date    COLORU YELLOW 08/27/2021 08/27/2020. HISTORY: ORDERING SYSTEM PROVIDED HISTORY: AMS TECHNOLOGIST PROVIDED HISTORY: Has a \"code stroke\" or \"stroke alert\" been called? ->No Reason for exam:->AMS Decision Support Exception - unselect if not a suspected or confirmed emergency medical condition->Emergency Medical Condition (MA) Is the patient pregnant?->No Reason for Exam: Respiratory Distress (Pt brought in per [facility] after call from  for resp distress. O2 sat 70's, BMV in route. Pt will open eyes to voice. Pupils 8mm and fixed. ) Acuity: Acute Type of Exam: Initial; ORDERING SYSTEM PROVIDED HISTORY: AMS, respiratory distress TECHNOLOGIST PROVIDED HISTORY: Reason for exam:->AMS, respiratory distress Is the patient pregnant?->No Reason for Exam: Respiratory Distress (Pt brought in per [facility] after call from  for resp distress. O2 sat 70's, BMV in route. Pt will open eyes to voice. Pupils 8mm and fixed. ) Acuity: Acute Type of Exam: Initial CT HEAD FINDINGS: BRAIN/VENTRICLES: No acute hemorrhage. Periventricular and subcortical hypoattenuation is nonspecific. Interval chronic lacunar infarcts in the left corona radiata, thalamus, and internal capsule. Artifact partially obscures the laureano. Artifact partially obscures the inferior cerebellum. Laura Bellis white differentiation appears maintained given artifact near the skull base and through the posterior fossa. Mild prominence of the ventricles noted. Overall ventricle size appears increased from prior exam.  There is no midline shift. Basal cisterns appear patent. ORBITS: Visualized orbits appear unremarkable on this unenhanced exam. SINUSES: Mild mucosal thickening of the ethmoid and sphenoid sinuses. Visualized mastoid air cells appear clear. SOFT TISSUES/SKULL: No depressed calvarial fracture. Fluid in the nasopharynx and oropharynx. CT HEAD IMPRESSION: No acute hemorrhage or midline shift.  Increased ventricle size compared to prior exam.  Correlate with presentation to exclude acute hydrocephalus. Other findings as described. CT CHEST FINDINGS: Mediastinum: Heart is borderline enlarged. Trace pericardial effusion or thickening. Aorta is within normal limits in size. Pulmonary arteries are within normal limits in size. . Lungs/pleura: Central airways are patent. Endotracheal tube with tip terminating in the distal 3rd subglottic trachea. Secretions noted in the trachea. Opacification of segmental and subsegmental bronchi. Ground-glass the confluent airspace disease. Interlobular septal thickening. Small to moderate pleural effusions. No pneumothorax. Soft Tissues/Bones: Suboptimal evaluation for adenopathy without intravenous contrast. Mediastinal adenopathy. Diffuse body wall edema. Scattered degenerative changes noted in the visualized spine without spondylolisthesis. Upper Abdomen: Limited images of the upper abdomen are unremarkable given lack of intravenous contrast. CT CHEST IMPRESSION: 1.   1. Multifocal airspace disease that likely represents a combination of aspiration/pneumonia and pulmonary edema. 2. Other findings as described. CT CHEST WO CONTRAST    Result Date: 8/27/2021  EXAMINATION: CT OF THE HEAD WITHOUT CONTRAST; CT OF THE CHEST WITHOUT CONTRAST  8/27/2021 7:12 pm TECHNIQUE: CT of the head was performed without the administration of intravenous contrast. Dose modulation, iterative reconstruction, and/or weight based adjustment of the mA/kV was utilized to reduce the radiation dose to as low as reasonably achievable.; CT of the chest was performed without the administration of intravenous contrast. Multiplanar reformatted images are provided for review. Dose modulation, iterative reconstruction, and/or weight based adjustment of the mA/kV was utilized to reduce the radiation dose to as low as reasonably achievable. COMPARISON: CT head 08/27/2020.  HISTORY: ORDERING SYSTEM PROVIDED HISTORY: [facility] TECHNOLOGIST PROVIDED HISTORY: Has a \"code stroke\" or \"stroke alert\" been called? ->No Reason for exam:->AMS Decision Support Exception - unselect if not a suspected or confirmed emergency medical condition->Emergency Medical Condition (MA) Is the patient pregnant?->No Reason for Exam: Respiratory Distress (Pt brought in [facility] after call from  for resp distress. O2 sat 70's, BMV in route. Pt will open eyes to voice. Pupils 8mm and fixed. ) Acuity: Acute Type of Exam: Initial; ORDERING SYSTEM PROVIDED HISTORY: AMS, respiratory distress TECHNOLOGIST PROVIDED HISTORY: Reason for exam:->AMS, respiratory distress Is the patient pregnant?->No Reason for Exam: Respiratory Distress (Pt brought in [facility] after call from  for resp distress. O2 sat 70's, BMV in route. Pt will open eyes to voice. Pupils 8mm and fixed. ) Acuity: Acute Type of Exam: Initial CT HEAD FINDINGS: BRAIN/VENTRICLES: No acute hemorrhage. Periventricular and subcortical hypoattenuation is nonspecific. Interval chronic lacunar infarcts in the left corona radiata, thalamus, and internal capsule. Artifact partially obscures the laureano. Artifact partially obscures the inferior cerebellum. Maria Luisa Deem white differentiation appears maintained given artifact near the skull base and through the posterior fossa. Mild prominence of the ventricles noted. Overall ventricle size appears increased from prior exam.  There is no midline shift. Basal cisterns appear patent. ORBITS: Visualized orbits appear unremarkable on this unenhanced exam. SINUSES: Mild mucosal thickening of the ethmoid and sphenoid sinuses. Visualized mastoid air cells appear clear. SOFT TISSUES/SKULL: No depressed calvarial fracture. Fluid in the nasopharynx and oropharynx. CT HEAD IMPRESSION: No acute hemorrhage or midline shift. Increased ventricle size compared to prior exam.  Correlate with presentation to exclude acute hydrocephalus. Other findings as described.  CT CHEST FINDINGS: Mediastinum: Heart is borderline enlarged. Trace pericardial effusion or thickening. Aorta is within normal limits in size. Pulmonary arteries are within normal limits in size. . Lungs/pleura: Central airways are patent. Endotracheal tube with tip terminating in the distal 3rd subglottic trachea. Secretions noted in the trachea. Opacification of segmental and subsegmental bronchi. Ground-glass the confluent airspace disease. Interlobular septal thickening. Small to moderate pleural effusions. No pneumothorax. Soft Tissues/Bones: Suboptimal evaluation for adenopathy without intravenous contrast. Mediastinal adenopathy. Diffuse body wall edema. Scattered degenerative changes noted in the visualized spine without spondylolisthesis. Upper Abdomen: Limited images of the upper abdomen are unremarkable given lack of intravenous contrast. CT CHEST IMPRESSION: 1.   1. Multifocal airspace disease that likely represents a combination of aspiration/pneumonia and pulmonary edema. 2. Other findings as described. XR CHEST PORTABLE    Result Date: 8/28/2021  EXAMINATION: ONE XRAY VIEW OF THE CHEST 8/28/2021 1:21 am COMPARISON: Chest x-ray dated 02/24/2021. [place] HISTORY: ORDERING SYSTEM PROVIDED HISTORY: central line and OG placement TECHNOLOGIST PROVIDED HISTORY: Reason for exam:->central line and OG placement FINDINGS: LINES/TUBES/OTHER: Endotracheal tube is noted with its tip approximately 5 cm from the ana. Enteric tube is noted coursing below the level of the diaphragm and within the stomach. Left IJ central venous catheter is noted with its tip projecting over the area of the left brachiocephalic vein. HEART/MEDIASTINUM: The cardiac silhouette is mildly enlarged, but stable. PLEURA/LUNGS: There is perihilar fullness and increased interstitial markings which may reflect pulmonary vascular congestion in the correct clinical setting. There is left basilar reflecting airspace disease, atelectasis or pleural effusion.   There is right basilar airspace disease. There is no appreciable pneumothorax. BONES/SOFT TISSUE: No acute abnormality. Endotracheal tube and enteric tube are in satisfactory position. The left IJ central venous catheter tip projects over the area of the left brachiocephalic vein. Right basilar airspace disease. Left basilar opacification reflecting airspace disease, atelectasis or pleural effusion. Conclusions      Summary   *Left ventricle - moderate concentric LVH, normal size and function with EF   of 55%   *Mitral valve - mild regurgitation   *Tricuspid valve - trivial regurgitation   *Bubble study - negative      Signature      ------------------------------------------------------------------   Electronically signed by Abilio Zheng MD (Interpreting   physician) on 08/31/2020 at 02:48 PM   ------------------------------------------------------------------         Summary   Left ventricular systolic function is normal with ejection fraction   estimated at 55-60 %. No regional wall motion abnormalities are noted. There is mild left ventricular hypertrophy. Normal left ventricular diastolic filling pressure. Moderate mitral regurgitation. Mild pulmonic regurgitation present. IVC size is dilated (>2.1 cm) but collapses > 50% with respiration   consistent with elevated RA pressure (8 mmHg). **There is a small-moderate bilateral pleural effusion. **There is a small circumferential pericardial effusion noted.       Previous echo done 2020 - EF 55%      Signature      ------------------------------------------------------------------   Electronically signed by Beau Cornelius MD   (Interpreting physician) on 08/30/2021 at 04:21 PM   ------------------------------------------------------------------

## 2021-09-03 NOTE — PROGRESS NOTES
09/03/21 0430   Vent Patient Data   Plateau Pressure 21 FFM43   Static Compliance 36 mL/cmH2O   Dynamic Compliance 20.41 mL/cmH2O

## 2021-09-03 NOTE — PROGRESS NOTES
[facility]   Progress Note  Cardiology    Chief Complaint   Patient presents with    Respiratory Distress     Pt brought in per [facility] after call from  for resp distress. O2 sat 70's, BMV in route. Pt will open eyes to voice. Pupils 8mm and fixed. HPI: Remains intubated and now with another weaning trial.  She is more awake and anxious. Cardiology plan is for cath when she is stable but not until next week. Presently with  A sinus tach on the monitor. No previous coronary evaluation in the setting of a pt with severe dm and blindness, recurrent infections in the past, CRF with nephrotic syndrome and hx of smoking. Trops on admit with elevated and flat ~0.22. Ekg is unremarkable. Medications/Labs all Reviewed    Recent Labs     09/03/21  0356   WBC 16.2*   HGB 7.9*   HCT 24.6*   MCV 88.7        Recent Labs     09/03/21  0356   CREATININE 2.8*   BUN 16      K 4.0      CO2 17*     No results for input(s): INR, PROTIME in the last 72 hours.      MEDICATIONS:    cloNIDine  1 patch TransDERmal Weekly    heparin (porcine)  5,000 Units SubCUTAneous 3 times per day    epoetin yohana-epbx  10,000 Units SubCUTAneous Every Other Day    furosemide  40 mg IntraVENous TID    insulin lispro  0-6 Units SubCUTAneous Q4H    pantoprazole  40 mg IntraVENous BID    sodium chloride flush  5-40 mL IntraVENous 2 times per day      dexmedetomidine 0.3 mcg/kg/hr (09/03/21 0809)    fentaNYL 45 mcg/hr (09/03/21 0810)    dextrose      propofol Stopped (09/03/21 0910)    sodium chloride Stopped (08/29/21 2301)       Physical Examination:    BP (!) 148/53   Pulse 88   Temp 99.2 °F (37.3 °C) (Bladder)   Resp 16   Ht 5' 6\" (1.676 m)   Wt 198 lb 10.2 oz (90.1 kg)   SpO2 99%   BMI 32.06 kg/m²     Respiratory:  · Resp Assessment: assisted by the vent  · Resp Auscultation: Clear to auscultation bilaterally   Cardiovascular:  · Auscultation: regular rate and rhythm, normal S1S2, no murmur, rub or gallop  · Palpation:  Nl PMI  · JVP:  normal  · Extremities: No Edema  Abdomen:  · Soft, non-tender  · Normal bowel sounds  Extremities:  ·  No Cyanosis or Clubbing  Neurological/Psychiatric:  · Oriented to time, place, and person  · Non-anxious  Skin Warm and dry      ECHO:  Left ventricular systolic function is normal with ejection fraction   estimated at 55-60 %. No regional wall motion abnormalities are noted. There is mild left ventricular hypertrophy. Normal left ventricular diastolic filling pressure. Moderate mitral regurgitation. Mild pulmonic regurgitation present. IVC size is dilated (>2.1 cm) but collapses > 50% with respiration   consistent with elevated RA pressure (8 mmHg). **There is a small-moderate bilateral pleural effusion. **There is a small circumferential pericardial effusion noted. Previous echo done 2020 - EF 55%  Assessment:    Active Problems:        Acute respiratory failure (HCC)  Plan: sudden hypoxic resp failure. No COVID. H/o CKD with KINZA. Severely elevated BNP but LV function essentially normal.  Mild diastolic dysfunction. Recommend LHC when medically stable and extubated on minimal respiratory support (probably next week). Now that she is on HD can dialyze contrast from cath. Elevated troponin could be from KINZA or respiratory failure. Likely mix aspiration PNA pulm edema from renal failure. Clearly volume overload with pulmonary edema and pleural effusions. Severe DM with complications which has been longstanding and for which she has been poorly compliant    Nephrotic syndrome - now on dialysis    Hypertension - I will start a clonidine patch and order labetalol for prn use with her tachycardia.      Tammy Orantes MD, 9/3/2021 11:06 AM

## 2021-09-03 NOTE — PROGRESS NOTES
Comprehensive Nutrition Assessment    Type and Reason for Visit:  Reassess    Nutrition Recommendations/Plan:   1. If unable to extubate, recommend NG placement and resuming tube feed  2. Consult RD if recs needed    Nutrition Assessment:  Pt remains intubated and sedated on propofol. Pt was tolerating Nepro at 15 mL/hr. Pt no longer with OG. If unable to extubate, recommend placing NG and resuming tube feed. Malnutrition Assessment:  Malnutrition Status:  Insufficient data        Estimated Daily Nutrient Needs:  Energy (kcal):  990 - 1260; Weight Used for Energy Requirements:  Current (90 kg)     Protein (g):  118; Weight Used for Protein Requirements:  Ideal (2.0g/59kg)        Fluid (ml/day):   ; Method Used for Fluid Requirements:  1 ml/kcal      Nutrition Related Findings:  HD continues; 9/3: BUN 16, creat 2.8, Na+ 142      Wounds:  None       Current Nutrition Therapies:    Diet NPO  ADULT TUBE FEEDING; Orogastric; Renal Formula; Continuous; 15; No; 30; Q 4 hours    Anthropometric Measures:  · Height: 5' 6\" (167.6 cm)  · Current Body Weight: 198 lb (89.8 kg)   · Admission Body Weight: 230 lb (104.3 kg)    · Ideal Body Weight: 130 lbs; % Ideal Body Weight 152.3 %   · BMI: 32  · BMI Categories: Obese Class 1 (BMI 30.0-34. 9)       Nutrition Diagnosis:   · Inadequate energy intake related to inadequate enteral nutrition infusion as evidenced by intubation      Nutrition Interventions:   Food and/or Nutrient Delivery:  Modify Tube Feeding  Nutrition Education/Counseling:  Education not indicated   Coordination of Nutrition Care:  Continue to monitor while inpatient    Goals:   Tolerate enteral nutrition at goal rate without any GI distress       Nutrition Monitoring and Evaluation:   Behavioral-Environmental Outcomes:  None Identified   Food/Nutrient Intake Outcomes:  Enteral Nutrition Intake/Tolerance  Physical Signs/Symptoms Outcomes:  Biochemical Data, Fluid Status or Edema, Weight     Discharge Planning:     Too soon to determine     Electronically signed by Nestor Martin RD, LD on 9/3/21 at 4:02 PM EDT  Contact: [phone]  Weekend contact: [phone]

## 2021-09-03 NOTE — PROGRESS NOTES
P Pulmonary and Critical Care  Progress note      Reason for Consult: Acute hypoxemic respiratory failure, pulmonary edema, acute on chronic kidney disease  Requesting Physician: Dr. Joan Gonsalves:   [facility] / \[facility] [place] [facility]\"":                The patient is a 55 y.o. female with significant past medical history of:      Diagnosis Date    Blind in both eyes     Cerebral artery occlusion with cerebral infarction ([address]     [facility] [address]     Chronic kidney disease     Depression     Diabetes mellitus out of control ([facility] [address]     Diabetes mellitus, type II ([facility] [address]     2005    Diabetic neuropathy associated with type 2 diabetes mellitus ([facility] [address]     Generalized headaches     Hypertension     Infertility     Insomnia     chronic vs lack of time spent to sleep    Migraine headache 11/09/2011    Mixed hyperlipidemia     Otitis media     h/o recurrent    Pelvic abscess in female 10/05/2013    Pneumonia     2004 approx.  Stroke [facility]) 08/27/2020     Interval history: Patient had another dialysis treatment yesterday. She had approximately 2.5 L fluid removal.  Remains intubated on mechanical ventilation. She is sedated with Precedex, propofol and fentanyl. However, she is alert this morning.       Past Surgical History:        Procedure Laterality Date    CERVIX SURGERY      laser tx for dysplasia;1992    EYE SURGERY      FOOT SURGERY Right     FOOT SURGERY Bilateral     FOOT SURGERY Left      Current Medications:    Current Facility-Administered Medications: dexmedetomidine (PRECEDEX) 400 mcg in sodium chloride 0.9 % 100 mL infusion, 0.2-1.4 mcg/kg/hr, IntraVENous, Continuous  heparin (porcine) injection 5,000 Units, 5,000 Units, SubCUTAneous, 3 times per day  fentaNYL 10 mcg/mL infusion, 12.5-200 mcg/hr, IntraVENous, Continuous  albumin human 25 % IV solution 25 g, 25 g, IntraVENous, PRN  epoetin yohana-epbx (RETACRIT) injection 10,000 Units, 10,000 Units, SubCUTAneous, Every Other Day  heparin (porcine) injection 2,400 Units, 2,400 Units, IntraCATHeter, PRN  furosemide (LASIX) injection 40 mg, 40 mg, IntraVENous, TID  insulin lispro (1 Unit Dial) 0-6 Units, 0-6 Units, SubCUTAneous, Q4H  glucose (GLUTOSE) 40 % oral gel 15 g, 15 g, Oral, PRN  dextrose 50 % IV solution, 12.5 g, IntraVENous, PRN  glucagon (rDNA) injection 1 mg, 1 mg, IntraMUSCular, PRN  dextrose 5 % solution, 100 mL/hr, IntraVENous, PRN  pantoprazole (PROTONIX) injection 40 mg, 40 mg, IntraVENous, BID  propofol injection, 5-50 mcg/kg/min, IntraVENous, Titrated  fentaNYL (SUBLIMAZE) injection 50 mcg, 50 mcg, IntraVENous, Q1H PRN  sodium chloride flush 0.9 % injection 5-40 mL, 5-40 mL, IntraVENous, 2 times per day  sodium chloride flush 0.9 % injection 5-40 mL, 5-40 mL, IntraVENous, PRN  0.9 % sodium chloride infusion, 25 mL, IntraVENous, PRN  ondansetron (ZOFRAN-ODT) disintegrating tablet 4 mg, 4 mg, Oral, Q8H PRN **OR** ondansetron (ZOFRAN) injection 4 mg, 4 mg, IntraVENous, Q6H PRN  polyethylene glycol (GLYCOLAX) packet 17 g, 17 g, Oral, Daily PRN  acetaminophen (TYLENOL) tablet 650 mg, 650 mg, Oral, Q6H PRN **OR** acetaminophen (TYLENOL) suppository 650 mg, 650 mg, Rectal, Q6H PRN  hydrALAZINE (APRESOLINE) injection 10 mg, 10 mg, IntraVENous, Q4H PRN    Allergies   Allergen Reactions    Amoxicillin Itching and Hives     Tolerates cephalosporins  Patient tolerating cefazolin (ANCEF) as of October 11, 2018  Patient tolerating cefazolin (ANCEF) as of October 11, 2018  Tolerates cephalosporins  Patient tolerating cefazolin (ANCEF) as of October 11, 2018    Levofloxacin Anaphylaxis    Levofloxacin Anaphylaxis    Vancomycin Shortness Of Breath, Hives and Anaphylaxis    Tape [Adhesive Tape] Other (See Comments)     Paper tape turns skin bright red. Plastic tape okay. Social History:    TOBACCO:   reports that she has been smoking cigarettes. She has been smoking about 1.00 pack per day.  She has never used smokeless tobacco.  ETOH:   reports no history of alcohol use. Patient currently lives independently  Environmental/chemical exposure: None known    Family History:       Problem Relation Age of Onset    Diabetes Mother    [employer] Other Mother 79        Covid    Diabetes Father     High Blood Pressure Father     Colon Cancer Father     Diabetes Sister     Alcohol Abuse Maternal Grandfather     Diabetes Paternal Grandmother     Alcohol Abuse Paternal Grandfather     Diabetes Paternal Aunt     Diabetes Paternal Uncle      REVIEW OF SYSTEMS:    ROS is unobtainable due to his critical illness. Objective:   PHYSICAL EXAM:      VITALS:  BP (!) 131/49   Pulse 88   Temp 99.9 °F (37.7 °C) (Bladder)   Resp 16   Ht 5' 6\" (1.676 m)   Wt 198 lb 10.2 oz (90.1 kg)   SpO2 99%   BMI 32.06 kg/m²      24HR INTAKE/OUTPUT:      Intake/Output Summary (Last 24 hours) at 9/3/2021 0954  Last data filed at 9/3/2021 0600  Gross per 24 hour   Intake 1081.92 ml   Output 3895 ml   Net -2813.08 ml     CONSTITUTIONAL: Sedated on mechanical ventilation  NECK:  Supple, symmetrical, trachea midline, no adenopathy, thyroid symmetric, not enlarged and no tenderness, skin normal  LUNGS:  no increased work of breathing and crackles to auscultation. No accessory muscle use  CARDIOVASCULAR: S1 and S2, no edema and no JVD  ABDOMEN:  normal bowel sounds, non-distended and no masses palpated, and no tenderness to palpation. No hepatospleenomegaly  LYMPHADENOPATHY:  no axillary or supraclavicular adenopathy. No cervical adnenopathy  PSYCHIATRIC: Sedated on mechanical ventilation MUSCULOSKELETAL: No obvious misalignment or effusion of the joints. No clubbing, cyanosis of the digits. SKIN:  normal skin color, texture, turgor and no redness, warmth, or swelling.  No palpable nodules    DATA:    Old records have been reviewed    CBC:  Recent Labs     09/01/21  0406 09/02/21  0530 09/03/21  0356   WBC 11.6* 9.9 16.2*   RBC 2.57* 2.48* 2.77*   HGB 7.4* 7.4* 7.9*   HCT 22.5* 21.8* 24.6*    198 191   MCV 87.5 87.8 88.7   MCH 28.9 29.9 28.6   MCHC 33.0 34.0 32.2   RDW 16.2* 16.1* 15.9*      BMP:  Recent Labs     09/01/21  0406 09/02/21  0530 09/03/21  0356    140 142   K 3.5 3.6 4.0    109 110   CO2 19* 18* 17*   BUN 31* 22* 16   CREATININE 4.0* 3.3* 2.8*   CALCIUM 7.7* 8.1* 8.5   GLUCOSE 105* 110* 125*      ABG:  No results for input(s): PHART, NBL0KAD, PO2ART, PQT1KIP, I6LEIXNV, BEART in the last 72 hours. Procalcitonin  Recent Labs     09/01/21  1057   PROCAL 0.68*       No results found for: BNP  Lab Results   Component Value Date    CKTOTAL 56 09/03/2021    TROPONINI 0.23 (H) 08/28/2021           Radiology Review:  All pertinent images / reports were reviewed as a part of this visit. Assessment:     1. Acute hypoxemic respiratory failure  2. Acute on chronic kidney disease  3. Acute pulmonary edema      Plan:     I have reviewed laboratories, medical records and images for this visit  I reviewed chest imaging  Chest x-ray does show gradual improvement  There is left retrocardiac density    Remains on AC/, respiratory rate 14 and FiO2 0.3 with PEEP 5  I switched her to PSV 10 and PEEP 5. So far she is tolerating that  If she does okay for about 30 minutes, obtain weaning parameters and leak test  Consider liberation from mechanical ventilation if she passes this    Did have a temperature last night. White count is up to 16,000  Repeat procalcitonin  Send sputum culture  Not on antibiotics at this point but she did have a course of cefepime and a few days of Zyvox earlier in her hospitalization    She is scheduled for another hemodialysis treatment today  Remains edematous and needs continued fluid removal  Nephrology is following    Patient did have echocardiogram showing good LV function and no significant diastolic dysfunction. Addendum: Patient failed her leak test.  He had 0% leak.   We will give her Solu-Medrol 40 mg every 6 hours for 24 hours and reassess. Has only a 7.5 mm ETT in place. Total critical care time caring for this patient with life threatening illness, including direct patient contact, management of life support systems, review of data including imaging and labs, discussions with other team members and physicians is at least 32 minutes so far today, excluding procedures.

## 2021-09-03 NOTE — PROGRESS NOTES
Hospitalist Progress Note      PCP: Alva Swain    Date of Admission: 8/27/2021    Chief Complaint: Respiratory distress    Hospital Course: 55 y.o. female who presented to [facility] with past medical history of hypertension, type 2 diabetes, CKD stage IV, HFpEF, hyperlipidemia presented to the ED with chief complaint of respiratory distress.     History cannot be obtained due to patient being intubated sedated. On chart review patient had 3 or 4 arrival sitting in bed and also having some new onset dyspnea that was severe sudden onset and then started progressively turning blue in the lips for her  and EMS was called noted to have saturation 60% on muscles brought here emergently. Patient in the ED noted was unable to protect her airway thus was emergently intubated for lifesaving measures.       Subjective:     Remains intubated and sedated, negative cumulative balance 9 L, had 1.2 mL over the last 24 hours, proBNP improving, creatinine 2.3.       Medications:  Reviewed    Infusion Medications    dexmedetomidine 0.3 mcg/kg/hr (09/03/21 0809)    fentaNYL 45 mcg/hr (09/03/21 0810)    dextrose      propofol Stopped (09/03/21 0910)    sodium chloride Stopped (08/29/21 2301)     Scheduled Medications    heparin (porcine)  5,000 Units SubCUTAneous 3 times per day    epoetin yohana-epbx  10,000 Units SubCUTAneous Every Other Day    furosemide  40 mg IntraVENous TID    insulin lispro  0-6 Units SubCUTAneous Q4H    pantoprazole  40 mg IntraVENous BID    sodium chloride flush  5-40 mL IntraVENous 2 times per day     PRN Meds: albumin human, heparin (porcine), glucose, dextrose, glucagon (rDNA), dextrose, fentanNYL, sodium chloride flush, sodium chloride, ondansetron **OR** ondansetron, polyethylene glycol, acetaminophen **OR** acetaminophen, hydrALAZINE      Intake/Output Summary (Last 24 hours) at 9/3/2021 1036  Last data filed at 9/3/2021 1004  Gross per 24 hour   Intake 1111.92 ml Output 3895 ml   Net -2783.08 ml       Physical Exam Performed:    BP (!) 148/53   Pulse 88   Temp 99.2 °F (37.3 °C) (Bladder)   Resp 16   Ht 5' 6\" (1.676 m)   Wt 198 lb 10.2 oz (90.1 kg)   SpO2 99%   BMI 32.06 kg/m²     General appearance: Appears comfortable on the vent looks approximately her stated age. HEENT: Pupils equal, round, and reactive to light. Conjunctivae/corneas clear. Neck: Supple, with full range of motion. No jugular venous distention. Trachea midline. Respiratory: She is vented and has some rales present at the bases bilaterally on her exam.  Eezes/Rhonchi. Cardiovascular: Regular rate and rhythm with normal S1/S2 without murmurs, rubs or gallops. Abdomen: Soft, non-tender, non-distended with normal bowel sounds. Musculoskeletal: No clubbing, cyanosis or edema bilaterally. Full range of motion without deformity. Skin: Skin color, texture, turgor normal.  No rashes or lesions. Neurologic: Intubated and sedated  Psychiatric: Intubated and sedated  Capillary Refill: Brisk,3 seconds, normal   Peripheral Pulses: +2 palpable, equal bilaterally       Labs:   Recent Labs     09/01/21 0406 09/02/21 0530 09/03/21  0356   WBC 11.6* 9.9 16.2*   HGB 7.4* 7.4* 7.9*   HCT 22.5* 21.8* 24.6*    198 191     Recent Labs     09/01/21 0406 09/02/21  0530 09/03/21  0356    140 142   K 3.5 3.6 4.0    109 110   CO2 19* 18* 17*   BUN 31* 22* 16   CREATININE 4.0* 3.3* 2.8*   CALCIUM 7.7* 8.1* 8.5   PHOS 4.4 3.9 3.4     Recent Labs     09/01/21 0406 09/02/21  0530 09/03/21  0356   AST 9* 9* 13*   ALT <5* 6* 8*   BILITOT <0.2 <0.2 <0.2   ALKPHOS 119 112 127     No results for input(s): INR in the last 72 hours.   Recent Labs     09/01/21 0406 09/02/21  0530 09/03/21  0356   CKTOTAL 31 43 56       Urinalysis:      Lab Results   Component Value Date    NITRU Negative 08/27/2021    WBCUA 7 08/27/2021    RBCUA 3 08/27/2021    BLOODU SMALL 08/27/2021    SPECGRAV 1.013 08/27/2021 VL Renal Arterial Duplex Complete    (Results Pending)           Assessment/Plan:    Active Hospital Problems    Diagnosis     Acute respiratory failure (HCC) [J96.00]     Acute on chronic diastolic heart failure (HCC) [I50.33]     Chronic kidney disease (CKD), stage III (moderate) (HCC) [N18.30]     Chronic diastolic (congestive) heart failure (HCC) [I50.32]     Pulmonary edema [J81.1]        Acute hypoxic respiratory failure requiring mechanical ventilation  Continue to monitor  Covid testing negative  Completed course of cefepime, leukocytosis improving     Acute pulmonary edema  proBNP trending down, patient was on Lasix per pulmonary  Creatinine improving on Lasix 40 mg twice daily, nephrology following. Acute on chronic HFpEF exacerbation  Has been on Lasix 3 times daily  Nephro is following    Diastolic dysfunction  Patient admitted with hypoxic respiratory failure  Cardiology following recommending [facility] when medically stable probably next week     Pneumonia  Earlier this admission suspected on CT imaging with leukocytosis  Completed 5-day course of cefepime    Sepsis  Leukocytosis, worsening  CCM repeating procalcitonin, sputum culture, currently off antibiotic    Blood culture grew staph epidermidis in 1 culture. Probable contamination     Hypertensive urgency, improving  On lasix, monitor BP     Acute on chronic renal failure  Being followed by nephrology  Had started HD on 8/31 with UF 1.8 L    Negative balance of 9 L    Normocytic anemia  Iron panel ordered, Hemoccult     Chronic medical conditions  Type 2 Diabetes: Insulin sliding scale  Hyperlipidemia: Continue home medication  Class II obesity:Complicating assessment and treatment. Placing patient at risk for multiple co-morbidities as well as early death and contributing to the patient's presentation.  Education, and counseling     DVT Prophylaxis: heparin SQ  Diet: Diet NPO  ADULT TUBE FEEDING; Orogastric; Renal Formula; Continuous; 15; No; 30; Q 4 hours  Code Status: Full Code    PT/OT Eval Status: After extubation    Due to the immediate potential for life-threatening deterioration due to acute hypoxic respiratory failure requiring mechanical ventilation, I spent 33 minutes providing critical care. This time is excluding time spent performing procedures.       Trupti Macias MD

## 2021-09-03 NOTE — FLOWSHEET NOTE
09/03/21 1415 09/03/21 1700   Vital Signs   Temp 98.1 °F (36.7 °C) 97.6 °F (36.4 °C)   Pulse 108 92   Resp 21 13   BP (!) 130/46 (!) 146/55   Height and Weight   Weight 203 lb 7.8 oz (92.3 kg) 198 lb 6.6 oz (90 kg)   UF tx was well tolerated, vss, no complications during treatment. Removed 2 liters without difficulty. A copy of the tx data sheets has been placed in the chart to be scanned into the EMR.

## 2021-09-04 LAB
A/G RATIO: 0.8 (ref 1.1–2.2)
ALBUMIN SERPL-MCNC: 2.5 G/DL (ref 3.4–5)
ALP BLD-CCNC: 119 U/L (ref 40–129)
ALT SERPL-CCNC: 6 U/L (ref 10–40)
ANION GAP SERPL CALCULATED.3IONS-SCNC: 16 MMOL/L (ref 3–16)
ANISOCYTOSIS: ABNORMAL
APTT: 40.3 SEC (ref 26.2–38.6)
AST SERPL-CCNC: 9 U/L (ref 15–37)
BANDED NEUTROPHILS RELATIVE PERCENT: 8 % (ref 0–7)
BASOPHILS ABSOLUTE: 0 K/UL (ref 0–0.2)
BASOPHILS RELATIVE PERCENT: 0 %
BILIRUB SERPL-MCNC: <0.2 MG/DL (ref 0–1)
BUN BLDV-MCNC: 27 MG/DL (ref 7–20)
CALCIUM IONIZED: 1.14 MMOL/L (ref 1.12–1.32)
CALCIUM SERPL-MCNC: 8.6 MG/DL (ref 8.3–10.6)
CHLORIDE BLD-SCNC: 104 MMOL/L (ref 99–110)
CO2: 16 MMOL/L (ref 21–32)
CREAT SERPL-MCNC: 3.7 MG/DL (ref 0.6–1.1)
EOSINOPHILS ABSOLUTE: 0 K/UL (ref 0–0.6)
EOSINOPHILS RELATIVE PERCENT: 0 %
GFR AFRICAN AMERICAN: 16
GFR NON-AFRICAN AMERICAN: 13
GLOBULIN: 3.2 G/DL
GLUCOSE BLD-MCNC: 125 MG/DL (ref 70–99)
GLUCOSE BLD-MCNC: 139 MG/DL (ref 70–99)
GLUCOSE BLD-MCNC: 167 MG/DL (ref 70–99)
GLUCOSE BLD-MCNC: 170 MG/DL (ref 70–99)
GLUCOSE BLD-MCNC: 174 MG/DL (ref 70–99)
GLUCOSE BLD-MCNC: 179 MG/DL (ref 70–99)
GLUCOSE BLD-MCNC: 179 MG/DL (ref 70–99)
GLUCOSE BLD-MCNC: 188 MG/DL (ref 70–99)
HCT VFR BLD CALC: 23.3 % (ref 36–48)
HEMOGLOBIN: 7.3 G/DL (ref 12–16)
LACTIC ACID: 0.7 MMOL/L (ref 0.4–2)
LYMPHOCYTES ABSOLUTE: 2.3 K/UL (ref 1–5.1)
LYMPHOCYTES RELATIVE PERCENT: 10 %
MACROCYTES: ABNORMAL
MAGNESIUM: 2.2 MG/DL (ref 1.8–2.4)
MCH RBC QN AUTO: 27.9 PG (ref 26–34)
MCHC RBC AUTO-ENTMCNC: 31.1 G/DL (ref 31–36)
MCV RBC AUTO: 89.8 FL (ref 80–100)
MONOCYTES ABSOLUTE: 0.7 K/UL (ref 0–1.3)
MONOCYTES RELATIVE PERCENT: 3 %
NEUTROPHILS ABSOLUTE: 19.9 K/UL (ref 1.7–7.7)
NEUTROPHILS RELATIVE PERCENT: 79 %
OVALOCYTES: ABNORMAL
PDW BLD-RTO: 16.3 % (ref 12.4–15.4)
PERFORMED ON: ABNORMAL
PH VENOUS: 7.4 (ref 7.35–7.45)
PHOSPHORUS: 6.3 MG/DL (ref 2.5–4.9)
PLATELET # BLD: 199 K/UL (ref 135–450)
PLATELET SLIDE REVIEW: ADEQUATE
PMV BLD AUTO: 8.5 FL (ref 5–10.5)
POLYCHROMASIA: ABNORMAL
POTASSIUM SERPL-SCNC: 4.3 MMOL/L (ref 3.5–5.1)
RBC # BLD: 2.6 M/UL (ref 4–5.2)
SLIDE REVIEW: ABNORMAL
SODIUM BLD-SCNC: 136 MMOL/L (ref 136–145)
TOTAL CK: 40 U/L (ref 26–192)
TOTAL PROTEIN: 5.7 G/DL (ref 6.4–8.2)
WBC # BLD: 22.9 K/UL (ref 4–11)

## 2021-09-04 PROCEDURE — 6360000002 HC RX W HCPCS: Performed by: INTERNAL MEDICINE

## 2021-09-04 PROCEDURE — 99291 CRITICAL CARE FIRST HOUR: CPT | Performed by: INTERNAL MEDICINE

## 2021-09-04 PROCEDURE — 80053 COMPREHEN METABOLIC PANEL: CPT

## 2021-09-04 PROCEDURE — 85025 COMPLETE CBC W/AUTO DIFF WBC: CPT

## 2021-09-04 PROCEDURE — 2500000003 HC RX 250 WO HCPCS: Performed by: INTERNAL MEDICINE

## 2021-09-04 PROCEDURE — 94761 N-INVAS EAR/PLS OXIMETRY MLT: CPT

## 2021-09-04 PROCEDURE — 82330 ASSAY OF CALCIUM: CPT

## 2021-09-04 PROCEDURE — 94003 VENT MGMT INPAT SUBQ DAY: CPT

## 2021-09-04 PROCEDURE — 6360000002 HC RX W HCPCS: Performed by: EMERGENCY MEDICINE

## 2021-09-04 PROCEDURE — 99233 SBSQ HOSP IP/OBS HIGH 50: CPT | Performed by: INTERNAL MEDICINE

## 2021-09-04 PROCEDURE — 2700000000 HC OXYGEN THERAPY PER DAY

## 2021-09-04 PROCEDURE — 2000000000 HC ICU R&B

## 2021-09-04 PROCEDURE — 90935 HEMODIALYSIS ONE EVALUATION: CPT

## 2021-09-04 PROCEDURE — 84100 ASSAY OF PHOSPHORUS: CPT

## 2021-09-04 PROCEDURE — 82550 ASSAY OF CK (CPK): CPT

## 2021-09-04 PROCEDURE — 2580000003 HC RX 258: Performed by: INTERNAL MEDICINE

## 2021-09-04 PROCEDURE — 83605 ASSAY OF LACTIC ACID: CPT

## 2021-09-04 PROCEDURE — 85730 THROMBOPLASTIN TIME PARTIAL: CPT

## 2021-09-04 PROCEDURE — C9113 INJ PANTOPRAZOLE SODIUM, VIA: HCPCS | Performed by: INTERNAL MEDICINE

## 2021-09-04 PROCEDURE — 83735 ASSAY OF MAGNESIUM: CPT

## 2021-09-04 RX ORDER — LORAZEPAM 2 MG/ML
0.5 INJECTION INTRAMUSCULAR EVERY 4 HOURS PRN
Status: DISCONTINUED | OUTPATIENT
Start: 2021-09-04 | End: 2021-09-13 | Stop reason: HOSPADM

## 2021-09-04 RX ADMIN — FUROSEMIDE 40 MG: 10 INJECTION, SOLUTION INTRAMUSCULAR; INTRAVENOUS at 08:10

## 2021-09-04 RX ADMIN — PROPOFOL 15 MCG/KG/MIN: 10 INJECTION, EMULSION INTRAVENOUS at 11:48

## 2021-09-04 RX ADMIN — LORAZEPAM 0.5 MG: 2 INJECTION INTRAMUSCULAR; INTRAVENOUS at 20:09

## 2021-09-04 RX ADMIN — HEPARIN SODIUM 5000 UNITS: 5000 INJECTION INTRAVENOUS; SUBCUTANEOUS at 21:13

## 2021-09-04 RX ADMIN — LABETALOL HYDROCHLORIDE 10 MG: 5 INJECTION INTRAVENOUS at 21:52

## 2021-09-04 RX ADMIN — EPOETIN ALFA-EPBX 10000 UNITS: 10000 INJECTION, SOLUTION INTRAVENOUS; SUBCUTANEOUS at 11:03

## 2021-09-04 RX ADMIN — LABETALOL HYDROCHLORIDE 10 MG: 5 INJECTION INTRAVENOUS at 16:34

## 2021-09-04 RX ADMIN — HYDRALAZINE HYDROCHLORIDE 10 MG: 20 INJECTION INTRAMUSCULAR; INTRAVENOUS at 19:41

## 2021-09-04 RX ADMIN — INSULIN LISPRO 1 UNITS: 100 INJECTION, SOLUTION INTRAVENOUS; SUBCUTANEOUS at 08:25

## 2021-09-04 RX ADMIN — INSULIN LISPRO 1 UNITS: 100 INJECTION, SOLUTION INTRAVENOUS; SUBCUTANEOUS at 19:49

## 2021-09-04 RX ADMIN — INSULIN LISPRO 1 UNITS: 100 INJECTION, SOLUTION INTRAVENOUS; SUBCUTANEOUS at 03:35

## 2021-09-04 RX ADMIN — PANTOPRAZOLE SODIUM 40 MG: 40 INJECTION, POWDER, FOR SOLUTION INTRAVENOUS at 08:09

## 2021-09-04 RX ADMIN — INSULIN LISPRO 1 UNITS: 100 INJECTION, SOLUTION INTRAVENOUS; SUBCUTANEOUS at 16:40

## 2021-09-04 RX ADMIN — FUROSEMIDE 40 MG: 10 INJECTION, SOLUTION INTRAMUSCULAR; INTRAVENOUS at 13:27

## 2021-09-04 RX ADMIN — DEXMEDETOMIDINE HYDROCHLORIDE 0.5 MCG/KG/HR: 4 INJECTION, SOLUTION INTRAVENOUS at 11:49

## 2021-09-04 RX ADMIN — HEPARIN SODIUM 5000 UNITS: 5000 INJECTION INTRAVENOUS; SUBCUTANEOUS at 05:53

## 2021-09-04 RX ADMIN — FUROSEMIDE 40 MG: 10 INJECTION, SOLUTION INTRAMUSCULAR; INTRAVENOUS at 21:12

## 2021-09-04 RX ADMIN — METHYLPREDNISOLONE SODIUM SUCCINATE 40 MG: 40 INJECTION, POWDER, FOR SOLUTION INTRAMUSCULAR; INTRAVENOUS at 05:53

## 2021-09-04 RX ADMIN — PROPOFOL 20 MCG/KG/MIN: 10 INJECTION, EMULSION INTRAVENOUS at 03:39

## 2021-09-04 RX ADMIN — PANTOPRAZOLE SODIUM 40 MG: 40 INJECTION, POWDER, FOR SOLUTION INTRAVENOUS at 21:13

## 2021-09-04 RX ADMIN — HEPARIN SODIUM 5000 UNITS: 5000 INJECTION INTRAVENOUS; SUBCUTANEOUS at 13:30

## 2021-09-04 RX ADMIN — DEXMEDETOMIDINE HYDROCHLORIDE 0.5 MCG/KG/HR: 4 INJECTION, SOLUTION INTRAVENOUS at 03:43

## 2021-09-04 RX ADMIN — Medication 10 ML: at 21:12

## 2021-09-04 RX ADMIN — Medication 10 ML: at 08:10

## 2021-09-04 RX ADMIN — Medication 10 ML: at 20:10

## 2021-09-04 RX ADMIN — METHYLPREDNISOLONE SODIUM SUCCINATE 40 MG: 40 INJECTION, POWDER, FOR SOLUTION INTRAMUSCULAR; INTRAVENOUS at 11:56

## 2021-09-04 ASSESSMENT — PULMONARY FUNCTION TESTS
PIF_VALUE: 16
PIF_VALUE: 26
PIF_VALUE: 24
PIF_VALUE: 24

## 2021-09-04 ASSESSMENT — PAIN SCALES - GENERAL
PAINLEVEL_OUTOF10: 0

## 2021-09-04 NOTE — PROGRESS NOTES
Physician Progress Note      PATIENT:               Amara Soler  [facility] #:                  [id number]  :                       1975  ADMIT DATE:       2021 6:52 PM  DISCH DATE:  RESPONDING  PROVIDER #:        AMANDA HOPE          QUERY TEXT:    Pt admitted with ARF w/ Hypoxia. Pt noted to have leukocytosis, elevated   Procalcitonin, low temp. If possible, please document in the progress notes   and discharge summary if you are evaluating and /or treating any of the   following: The medical record reflects the following:  Risk Factors: ARF w/Hypoxia requiring Mechanical Ventilation, Acute on Chronic   dCHF, HTN, KINZA, CKD stage 4, DM 2  Clinical Indicators: Per IM progress note 21 \"Sepsis Leukocytosis,   worsening CCM repeating procalcitonin, sputum culture, currently off   antibiotic. \"  Labs on admission 21 WBC 21 Abs. Neutrophils 16.3  Rectal Temp 96.0       21  WBC 14.7 Procalcitonin 0.86  Treatment: Pulmonology/Nephrology/Cardiology Consults, iv Maxipime, monitor   respiratory status and labs  Options provided:  -- Sepsis, present on admission  -- Sepsis, not present on admission  -- Other - I will add my own diagnosis  -- Disagree - Not applicable / Not valid  -- Disagree - Clinically unable to determine / Unknown  -- Refer to Clinical Documentation Reviewer    PROVIDER RESPONSE TEXT:    This patient has sepsis which was present on admission. Query created by: Meg Worley on 9/3/2021 1:23 PM      QUERY TEXT:    Patient admitted with ARF w/Hypoxia. Noted documentation of Pneumonia in IM   progress note on 21. In order to support the diagnosis of Pneumonia,   please include additional clinical indicators in your documentation. Or   please document if the diagnosis of Pneumonia has been ruled out after further   study.     The medical record reflects the following:  Risk Factors: ARF w/Hypoxia requiring Mechanical Ventilation, Sepsis, Acute on   Chronic dCHF, DM 2, HTN, KINZA, CKD 4  Clinical Indicators: Per IM progress note 08/28/21 \"Pneumonia: Suspected on CT   imaging with leukocytosis Patient will be empirically treated with Zyvox and   cefepime. \"  on 09/03/21 \"Pneumonia Earlier this admission suspected on CT   imaging with leukocytosis Completed 5-day course of cefepime. Lorean Snare Lorean Snare Sepsis   Leukocytosis, worsening CCM repeating procalcitonin, sputum culture, currently   off antibiotic. \"  Treatment: Pulmonary/Nephrology/Cardiology Consults, iv Maxipime, iv Zyvox, CT   Chest, CxR, monitor respiratory status and labs  Options provided:  -- Pneumonia present on admission as evidenced by, Please document evidence.   -- Pneumonia present not on admission  -- Pneumonia was ruled out  -- Other - I will add my own diagnosis  -- Disagree - Not applicable / Not valid  -- Disagree - Clinically unable to determine / Unknown  -- Refer to Clinical Documentation Reviewer    PROVIDER RESPONSE TEXT:    Pneumonia is present on admission as evidenced by CT findings, elevated WBC ,   procal    Query created by: Diego Damico on 9/3/2021 1:44 PM      Electronically signed by:  Malathi Gutierrez 9/4/2021 7:23 AM

## 2021-09-04 NOTE — PROGRESS NOTES
09/04/21 0834   Vent Patient Data   Plateau Pressure 20 [id number]   Static Compliance 38.6 mL/cmH2O   Dynamic Compliance 27.5 mL/cmH2O

## 2021-09-04 NOTE — PROGRESS NOTES
MD Sandie Martinez MD Rice Kuster, MD               Office: [phone]                      Fax: [phone]             9 [facility]                   NEPHROLOGY CVU INPATIENT PROGRESS NOTE:     PATIENT NAME: Sabina Quintana  : 1975  MRN: [id number]     Nephrology treatment plan update. Remains critically ill. Intubated on ventilator. Sedated. Blood pressure is variable  Meza catheter. .    Hemodialysis treatment today. Medications reviewed. All nephrotoxic medications have been discontinued. Hold Ace inhibitors till renal recovery is complete. Anemia-worsening-hemoglobin of 7.3.  -Continue to monitor.  -May require transfusion. Poor nutritional status.  -Albumin is 2.5. Discussed with treatment team.  Discussed with RN. Patient critically ill   T.35 m    Patient seen separately on hemodialysis. Tolerating well. Good flow through the access. Attempting 2-3 fluid removal as tolerated. Intubated on ventilator. Monitor closely. Discussed with dialysis RN at bedside           RECOMMENDATIONS:     - F/UP CXR today   - dialysis today- but UF only today-  - next HD - full on Saturday     - Needs a long term access, eventually prefered [facility] w/ h/o advanced CKD, likely will need long-term   -consult IR on Monday for [facility] placement     - continue Lasix too- Rx: IV lasix  40- TID - as making urine too   -Monitor acidosis,  -Hold home dose of aldactone, lisinopril,  -PNA Rx w/ iv abx    -BP is improving w/ hydralazine, use PRN for >150,  -vent mx per [facility]  - at higher risk for decompensation, needing closer monitoring. D/C plan from renal stand point:  - unclear, as critically ill     Have Discussed with pt's  in details.    Discussed with patient's treatment team-  CVU nurse, hospitalist- [facility]      IMPRESSION:       Admitted for:  Acute respiratory failure ([address] [J96.00]  Encephalopathy [G93.40]  Respiratory failure with hypoxia, unspecified chronicity ([address] [J96.91]  Pneumonia due to infectious organism, unspecified laterality, unspecified part of lung [J18.9]      KINZA (on proteinuric CKD: 4): Worsening  - BL Scr-last known 2.5, in 2/2021,   ---> 4.6 on admission  -: Etiology of KINZA -presumed ATN in the setting of pneumonia, unclear if it this is advancing CKD    -appreciated assistance by Dr Yoon Sepulveda for Rt IJ non-TDC placement (8/31)   -Hemodialysis #1 8/31/2021      History of nephrotic range proteinuria,   - thought to be from diabetic nephropathy With CKD stage III -> so high risk of advanced CKD,  -Follows with Dr. Cheri Chavez  -Noncompliance, last follow-up was 2/2021 then lost for follow-up  -echo results -  IVC size is dilated (>2.1 cm) but collapses > 50% with respiration consistent with elevated RA pressure (8 mmHg).    -Last echo-8/2020  moderate concentric LVH, normal size and function with EF of 02%, normal diastolic function      Associated problems:   Hypokalemia-needing repletion  Acidosis, non-anion gap-  Hypomagnesemia-  Hypophosphatemia  Anemia, chronic disease-worsening        Other major problems: Management per primary and other consulting teams.   //Acute hypoxic respiratory failure -needing intubation  // Multifocal airspace disease that likely represents a combination of aspiration/pneumonia and pulmonary edema  //Fluid overload  -COVID was ruled out  //History of uncontrolled diabetes last A1c was 11.3    // Hypertension un-controlled,       Hospital Problems         Last Modified POA    Pulmonary edema 8/28/2021 Yes    Chronic kidney disease (CKD), stage III (moderate) ([facility]) 8/28/2021 Yes    Chronic diastolic (congestive) heart failure ([address] 8/28/2021 Yes    Acute on chronic diastolic heart failure ([address] 8/28/2021 Yes    Acute respiratory failure ([address] 8/27/2021 Yes        : other supportive care :   - Check daily renal function panel with electrolytes-phosphorus  - Strict monitoring of I/Os, daily weight  - Renal feeds/diet  - Current medications reviewed. - Nephrotoxic medications have been discontinued. - Dose adjusted and appropriate. - Dose meds for eGFR <15 mL/min/1.73m2 during KINZA    - Avoid heavy opioids due to renal failure - may use very low dose dilaudid / fentanyl with close monitoring of CNS and respiratory depression. Please refer to the orders. High Complexity. Multiple complex problems. Discussed with patient 's treatment team- ICU team, nurse     Thank you for allowing me to participate in this patient's care. Please do not hesitate to contact me anytime. We will follow along with you. Bianca Kilgore MD,  [facility] [id number] [place] [address] [place]: [phone] or [employer]  Fax: [phone]     Severally ill, at risk of impending organ failure needing ICU higher level of care/monitoring. Time spent  35 minutes that included face-to-face meeting/discussion with patient's treatment team (including primary/referring team and other consultants; included coordination of care with the treatment team; and review of patient's electronic medical records and ordering appropriates tests.         ========================================================   ========================================================   Subjective:   Patient currently still  ill, remains in ICU as remains intubated sedated,   HD tolerating ok so far,   UOP (+) noted 1.2L   Past medical, Surgical, Social, Family medical history reviewed by me. MEDICATIONS: reviewed by me. Medications Prior to Admission:  No current facility-administered medications on file prior to encounter.      Current Outpatient Medications on File Prior to Encounter   Medication Sig Dispense Refill    Dulaglutide 0.75 MG/0.5ML SOPN Inject 0.75 mg into the skin once a week 4 pen 1    ibuprofen (ADVIL;MOTRIN) 200 MG CAPS Take 1 capsule by mouth      furosemide (LASIX) 80 MG tablet Take 80 mg by mouth every morning 80mg in 12         Current Facility-Administered Medications:     cloNIDine (CATAPRES) 0.2 MG/24HR 1 patch, 1 patch, TransDERmal, Weekly, Modesta Dennis MD, 1 patch at 09/03/21 1145    labetalol (NORMODYNE;TRANDATE) injection 10 mg, 10 mg, IntraVENous, Q4H PRN, Modesta Dennis MD    methylPREDNISolone sodium (SOLU-MEDROL) injection 40 mg, 40 mg, IntraVENous, Q6H, Sherlie Lombard, MD, 40 mg at 09/04/21 0553    dexmedetomidine (PRECEDEX) 400 mcg in sodium chloride 0.9 % 100 mL infusion, 0.2-1.4 mcg/kg/hr, IntraVENous, Continuous, Sherlie Lombard, MD, Last Rate: 12.2 mL/hr at 09/04/21 0735, 0.5 mcg/kg/hr at 09/04/21 0735    heparin (porcine) injection 5,000 Units, 5,000 Units, SubCUTAneous, 3 times per day, Sherlie Lombard, MD, 5,000 Units at 09/04/21 0553    fentaNYL 10 mcg/mL infusion, 12.5-200 mcg/hr, IntraVENous, Continuous, Sherlie Lombard, MD, Stopped at 09/03/21 1217    albumin human 25 % IV solution 25 g, 25 g, IntraVENous, PRN, Levander Gowers, MD, Stopped at 09/01/21 1439    epoetin yohana-epbx (RETACRIT) injection 10,000 Units, 10,000 Units, SubCUTAneous, Every Other Day, Levander Gowers, MD, 10,000 Units at 09/04/21 1103    heparin (porcine) injection 2,400 Units, 2,400 Units, IntraCATHeter, PRN, Levander Gowers, MD    furosemide (LASIX) injection 40 mg, 40 mg, IntraVENous, TID, Levander Gowers, MD, 40 mg at 09/04/21 0810    insulin lispro (1 Unit Dial) 0-6 Units, 0-6 Units, SubCUTAneous, Q4H, Brittany Dejesus DO, 1 Units at 09/04/21 0825    glucose (GLUTOSE) 40 % oral gel 15 g, 15 g, Oral, PRN, Brittany Dejesus DO    dextrose 50 % IV solution, 12.5 g, IntraVENous, PRN, Ahmad IRA Dejesus, DO    glucagon (rDNA) injection 1 mg, 1 mg, IntraMUSCular, PRN, Reymundod IRA Dejesus, DO    dextrose 5 % solution, 100 mL/hr, IntraVENous, PRN, Reymundod IRA Dejesus, DO    pantoprazole (PROTONIX) injection 40 mg, 40 mg, IntraVENous, BID, Gerson Olivares MD, 40 mg at 09/04/21 0809    propofol injection, 5-50 mcg/kg/min, IntraVENous, Titrated, Carla Song MD, Last Rate: 10 mL/hr at 09/04/21 0736, 15 mcg/kg/min at 09/04/21 0736    fentaNYL (SUBLIMAZE) injection 50 mcg, 50 mcg, IntraVENous, Q1H PRN, Carla Song MD    sodium chloride flush 0.9 % injection 5-40 mL, 5-40 mL, IntraVENous, 2 times per day, Brittany Dejesus, DO, 10 mL at 09/04/21 0810    sodium chloride flush 0.9 % injection 5-40 mL, 5-40 mL, IntraVENous, PRN, Brittany Dejesus, DO, 10 mL at 08/31/21 1532    0.9 % sodium chloride infusion, 25 mL, IntraVENous, PRN, Nolan Dejesus DO, Stopped at 08/29/21 2301    ondansetron (ZOFRAN-ODT) disintegrating tablet 4 mg, 4 mg, Oral, Q8H PRN **OR** ondansetron (ZOFRAN) injection 4 mg, 4 mg, IntraVENous, Q6H PRN, Brittany Archerzi, DO    polyethylene glycol (GLYCOLAX) packet 17 g, 17 g, Oral, Daily PRN, Shane Archerzi, DO    acetaminophen (TYLENOL) tablet 650 mg, 650 mg, Oral, Q6H PRN **OR** acetaminophen (TYLENOL) suppository 650 mg, 650 mg, Rectal, Q6H PRN, Brittany Archerzi, DO, 650 mg at 09/02/21 1648    hydrALAZINE (APRESOLINE) injection 10 mg, 10 mg, IntraVENous, Q4H PRN, Brittany Dejesus, DO, 10 mg at 08/28/21 1121      REVIEW OF SYSTEMS:     Review of systems not obtained due to patient factors - intubation       PHYSICAL EXAM:  Recent vital signs and recent I/Os reviewed by me.      Wt Readings from Last 3 Encounters:   09/04/21 198 lb 6.6 oz (90 kg)   07/08/21 244 lb 11.2 oz (111 kg)   02/26/21 237 lb 3.2 oz (107.6 kg)     BP Readings from Last 3 Encounters:   09/04/21 (!) 176/68   07/08/21 (!) 160/100   02/26/21 133/86     Patient Vitals for the past 24 hrs:   BP Temp Temp src Pulse Resp SpO2 Weight   09/04/21 0837 (!) 176/68 97 °F (36.1 °C) -- 79 -- -- 198 lb 6.6 oz (90 kg)   09/04/21 0834 -- -- -- 81 16 96 % --   09/04/21 0800 (!) 172/68 98.8 °F (37.1 °C) Bladder 78 16 99 % --   09/04/21 0717 -- -- -- 78 -- -- --   09/04/21 0630 (!) 161/63 -- -- 80 16 97 % --   09/04/21 0600 (!) 162/66 -- -- 79 16 100 % -- 09/04/21 0559 -- -- -- 79 16 100 % --   09/04/21 0545 -- -- -- 78 16 100 % --   09/04/21 0530 (!) 156/63 -- -- 77 16 100 % --   09/04/21 0500 (!) 156/62 -- -- 77 16 100 % --   09/04/21 0430 (!) 155/63 -- -- 77 16 100 % --   09/04/21 0400 (!) 152/61 -- -- 77 16 100 % --   09/04/21 0347 -- -- -- 77 16 99 % 193 lb 12.6 oz (87.9 kg)   09/04/21 0333 -- -- -- 77 16 99 % --   09/04/21 0330 (!) 148/61 -- -- 77 16 99 % --   09/04/21 0300 (!) 148/61 -- -- 81 16 97 % --   09/04/21 0230 (!) 151/61 -- -- 81 16 96 % --   09/04/21 0200 (!) 141/51 -- -- 81 16 96 % --   09/04/21 0130 (!) 140/54 -- -- 83 16 97 % --   09/04/21 0100 (!) 133/50 -- -- 81 16 97 % --   09/04/21 0030 (!) 132/50 -- -- 81 16 96 % --   09/04/21 0000 (!) 135/53 -- -- 84 16 97 % --   09/03/21 2330 (!) 131/52 -- -- 84 16 97 % --   09/03/21 2325 -- -- -- 82 12 97 % --   09/03/21 2300 (!) 130/50 -- -- 82 12 97 % --   09/03/21 2200 (!) 132/56 -- -- 82 11 97 % --   09/03/21 2130 (!) 132/56 -- -- 83 12 97 % --   09/03/21 2100 (!) 142/58 -- -- 85 14 97 % --   09/03/21 2030 (!) 145/63 -- -- 89 14 96 % --   09/03/21 2015 (!) 147/64 -- -- 90 20 96 % --   09/03/21 2011 -- -- -- 89 16 97 % --   09/03/21 2000 (!) 125/54 97.8 °F (36.6 °C) Temporal 86 12 96 % --   09/03/21 1700 (!) 146/55 97.6 °F (36.4 °C) Temporal 92 13 97 % 198 lb 6.6 oz (90 kg)   09/03/21 1649 -- -- -- -- -- 98 % --   09/03/21 1600 (!) 130/53 99.6 °F (37.6 °C) Temporal 94 16 97 % --   09/03/21 1415 (!) 130/46 98.1 °F (36.7 °C) Temporal 108 21 95 % 203 lb 7.8 oz (92.3 kg)   09/03/21 1200 (!) 166/62 99.9 °F (37.7 °C) Bladder 105 18 98 % --   09/03/21 1145 (!) 151/61 -- -- -- -- -- --       Intake/Output Summary (Last 24 hours) at 9/4/2021 1142  Last data filed at 9/4/2021 0810  Gross per 24 hour   Intake 549.25 ml   Output 690 ml   Net -140.75 ml          Constitutional: Poorly responsive, Intubated and  obese habitus  Eyes: Conjunctiva clear and Noscleral icterus  Ear, Nose, and Throat: moist oral mucosa; ET to vent  Neck: Trachea midline,  No jugular venous distension  Cardiovascular: Regular rate and ryhthm, normal S1 and S2,    Respiratory: Mechanically ventilated; Lung sounds notable for synchronous vent-associated breath sounds  Abdomen:  distended. Normal bowel sounds. : Meza catheter is inserted, draining urine  Skin: no rash,  bruises on visible body area  Musculoskeletal: No clubbing or cyanosis of digits. and Normocephalic. Extremitties: +++ peripheral edema, no deformities. Neurologic:Unable to obtain as intubated/sedated. Psychiatric: Unable to obtain as intubated/sedated. DATA:  Diagnostic tests reviewed by me for today's visit:   (AS NEEDED FOR MY EVALUATION AND MANAGEMENT). Recent Labs     09/02/21  0530 09/03/21  0356 09/04/21  0330   WBC 9.9 16.2* 22.9*   HCT 21.8* 24.6* 23.3*    191 199     Iron Saturation:  No components found for: PERCENTFE  FERRITIN:    Lab Results   Component Value Date    FERRITIN 112.0 08/28/2021     IRON:    Lab Results   Component Value Date    IRON 22 08/28/2021     TIBC:    Lab Results   Component Value Date    TIBC 184 08/28/2021       Recent Labs     09/02/21  0530 09/03/21  0356 09/04/21  0330    142 136   K 3.6 4.0 4.3    110 104   CO2 18* 17* 16*   BUN 22* 16 27*   CREATININE 3.3* 2.8* 3.7*     Recent Labs     09/02/21  0530 09/03/21  0356 09/04/21  0330   CALCIUM 8.1* 8.5 8.6   MG 2.00 2.00 2.20   PHOS 3.9 3.4 6.3*     No results for input(s): PH, PCO2, PO2 in the last 72 hours.     Invalid input(s): Nika Oreilly    ABG:  No results found for: PH, PCO2, PO2, HCO3, BE, THGB, TCO2, O2SAT  VBG:    Lab Results   Component Value Date    PHVEN 7.398 09/04/2021    OCB9VRT 34.3 02/23/2021    BEVEN -4.6 02/23/2021    B6DCKLUC 100 02/23/2021       LDH:  No results found for: LDH  Uric Acid:  No results found for: LABURIC, URICACID    PT/INR:    Lab Results   Component Value Date    PROTIME 11.2 08/27/2021    INR 0.99 administration of intravenous contrast. Multiplanar reformatted images are provided for review. Dose modulation, iterative reconstruction, and/or weight based adjustment of the mA/kV was utilized to reduce the radiation dose to as low as reasonably achievable. COMPARISON: CT head 08/27/2020. HISTORY: ORDERING SYSTEM PROVIDED HISTORY: AMS TECHNOLOGIST PROVIDED HISTORY: Has a \"code stroke\" or \"stroke alert\" been called? ->No Reason for exam:->AMS Decision Support Exception - unselect if not a suspected or confirmed emergency medical condition->Emergency Medical Condition (MA) Is the patient pregnant?->No Reason for Exam: Respiratory Distress (Pt brought in [facility] after call from  for resp distress. O2 sat 70's, BMV in route. Pt will open eyes to voice. Pupils 8mm and fixed. ) Acuity: Acute Type of Exam: Initial; ORDERING SYSTEM PROVIDED HISTORY: AMS, respiratory distress TECHNOLOGIST PROVIDED HISTORY: Reason for exam:->AMS, respiratory distress Is the patient pregnant?->No Reason for Exam: Respiratory Distress (Pt brought in [facility] after call from  for resp distress. O2 sat 70's, BMV in route. Pt will open eyes to voice. Pupils 8mm and fixed. ) Acuity: Acute Type of Exam: Initial CT HEAD FINDINGS: BRAIN/VENTRICLES: No acute hemorrhage. Periventricular and subcortical hypoattenuation is nonspecific. Interval chronic lacunar infarcts in the left corona radiata, thalamus, and internal capsule. Artifact partially obscures the laureano. Artifact partially obscures the inferior cerebellum. Jenetta Kennel white differentiation appears maintained given artifact near the skull base and through the posterior fossa. Mild prominence of the ventricles noted. Overall ventricle size appears increased from prior exam.  There is no midline shift. Basal cisterns appear patent.  ORBITS: Visualized orbits appear unremarkable on this unenhanced exam. SINUSES: Mild mucosal thickening of the ethmoid and sphenoid sinuses. Visualized mastoid air cells appear clear. SOFT TISSUES/SKULL: No depressed calvarial fracture. Fluid in the nasopharynx and oropharynx. CT HEAD IMPRESSION: No acute hemorrhage or midline shift. Increased ventricle size compared to prior exam.  Correlate with presentation to exclude acute hydrocephalus. Other findings as described. CT CHEST FINDINGS: Mediastinum: Heart is borderline enlarged. Trace pericardial effusion or thickening. Aorta is within normal limits in size. Pulmonary arteries are within normal limits in size. . Lungs/pleura: Central airways are patent. Endotracheal tube with tip terminating in the distal 3rd subglottic trachea. Secretions noted in the trachea. Opacification of segmental and subsegmental bronchi. Ground-glass the confluent airspace disease. Interlobular septal thickening. Small to moderate pleural effusions. No pneumothorax. Soft Tissues/Bones: Suboptimal evaluation for adenopathy without intravenous contrast. Mediastinal adenopathy. Diffuse body wall edema. Scattered degenerative changes noted in the visualized spine without spondylolisthesis. Upper Abdomen: Limited images of the upper abdomen are unremarkable given lack of intravenous contrast. CT CHEST IMPRESSION: 1.   1. Multifocal airspace disease that likely represents a combination of aspiration/pneumonia and pulmonary edema. 2. Other findings as described. XR CHEST PORTABLE    Result Date: 8/28/2021  EXAMINATION: ONE XRAY VIEW OF THE CHEST 8/28/2021 1:21 am COMPARISON: Chest x-ray dated 02/24/2021. Surya Hilt HISTORY: ORDERING SYSTEM PROVIDED HISTORY: central line and OG placement TECHNOLOGIST PROVIDED HISTORY: Reason for exam:->central line and OG placement FINDINGS: LINES/TUBES/OTHER: Endotracheal tube is noted with its tip approximately 5 cm from the ana. Enteric tube is noted coursing below the level of the diaphragm and within the stomach.   Left IJ central venous catheter is noted with its tip projecting over the area of the left brachiocephalic vein. HEART/MEDIASTINUM: The cardiac silhouette is mildly enlarged, but stable. PLEURA/LUNGS: There is perihilar fullness and increased interstitial markings which may reflect pulmonary vascular congestion in the correct clinical setting. There is left basilar reflecting airspace disease, atelectasis or pleural effusion. There is right basilar airspace disease. There is no appreciable pneumothorax. BONES/SOFT TISSUE: No acute abnormality. Endotracheal tube and enteric tube are in satisfactory position. The left IJ central venous catheter tip projects over the area of the left brachiocephalic vein. Right basilar airspace disease. Left basilar opacification reflecting airspace disease, atelectasis or pleural effusion. Conclusions      Summary   *Left ventricle - moderate concentric LVH, normal size and function with EF   of 55%   *Mitral valve - mild regurgitation   *Tricuspid valve - trivial regurgitation   *Bubble study - negative      Signature      ------------------------------------------------------------------   Electronically signed by Amanda Zheng MD (Interpreting   physician) on 08/31/2020 at 02:48 PM   ------------------------------------------------------------------         Summary   Left ventricular systolic function is normal with ejection fraction   estimated at 55-60 %. No regional wall motion abnormalities are noted. There is mild left ventricular hypertrophy. Normal left ventricular diastolic filling pressure. Moderate mitral regurgitation. Mild pulmonic regurgitation present. IVC size is dilated (>2.1 cm) but collapses > 50% with respiration   consistent with elevated RA pressure (8 mmHg). **There is a small-moderate bilateral pleural effusion. **There is a small circumferential pericardial effusion noted.       Previous echo done 2020 - EF 55%      Signature ------------------------------------------------------------------   Electronically signed by Mei Gerardo MD, [facility] [place], [address]   (Interpreting physician) on 08/30/2021 at 04:21 PM   ------------------------------------------------------------------

## 2021-09-04 NOTE — PROGRESS NOTES
Pt leak test 16.4% with audible leak around the cuff, and RSBI of 29. Pt extubated to 3LNC 98%. Tolerated well. Will continue to monitor.

## 2021-09-04 NOTE — FLOWSHEET NOTE
Treatment time: 3 hours  Net UF: 2.6 liters     Pre weight: 90 kg   Post weight: 87.5   EDW: tbd     Access used: R IJ NTDC   Access function: good     Medications or blood products given: Retacrit 10,000     Regular outpatient schedule: Daily, KINZA    Summary of response to treatment: 2.6 liters removed. Pt tolerated tx well. Post VSS. Report given to primary nurse     Copy of dialysis treatment record placed in chart, to be scanned into EMR.     09/04/21 0837 09/04/21 1200   Vital Signs   BP (!) 176/68 (!) 166/66   Temp 97 °F (36.1 °C) 97 °F (36.1 °C)   Pulse 79 83   Weight 198 lb 6.6 oz (90 kg) 192 lb 14.4 oz (87.5 kg)   Weight Method Bed scale Bed scale   Treatment Initiation   Dialyze Hours 3  --    Treatment  Initiation Universal Precautions maintained;Lines secured to patient; Connections secured;Prime given;Venous Parameters set; Arterial Parameters set; Air foam detector engaged; Hemosafe Device; Dialysate;Saline line double clamped; Hemo-Safe Applied;F180  --    Post-Hemodialysis Assessment   Hemodialysis Intake (ml)  --  800 ml   Hemodialysis Output (ml)  --  3400 ml   NET Removed (ml)  --  2600 ml   Tolerated Treatment  --  Good

## 2021-09-04 NOTE — PLAN OF CARE
Problem: Nutrition  Goal: Optimal nutrition therapy  Outcome: Ongoing  Note: Pt NPO     Problem: Airway Clearance - Ineffective:  Goal: Ability to maintain a clear airway will improve  Outcome: Ongoing  Note: Room air     Problem: Anxiety/Stress:  Goal: Level of anxiety will decrease  Outcome: Ongoing  Note: Calm and cooperative       Problem: Cardiac Output - Decreased:  Goal: Hemodynamic stability will improve  Outcome: Ongoing  Note: Hypertension; labetalol PRN     Problem: Pain:  Goal: Pain level will decrease  Outcome: Ongoing  Note: No S/S of pain      Problem: Serum Glucose Level - Abnormal:  Goal: Ability to maintain appropriate glucose levels will improve to within specified parameters  Outcome: Ongoing  Note: Q4 accuchecks      Problem: Skin Integrity - Impaired:  Goal: Absence of new skin breakdown  Outcome: Ongoing  Note: No S/S of skin breakdown      Problem: Falls - Risk of:  Goal: Will remain free from falls  Outcome: Ongoing  Note: Fall Risk = high; Bedrest; Ambulation Equipment: None; Call-light within reach and patient uses call-light for assistance- NO; Bed alarm: YES; Side rails up 2/4; Educated patient on fall risk status and need to call for assistance;  Patient unable to verbalize understanding of fall risk status and fall education; Pt experienced no falls/injuries this shift

## 2021-09-04 NOTE — PROGRESS NOTES
Hospitalist Progress Note      PCP: Court Boone    Date of Admission: 8/27/2021    Chief Complaint: Respiratory distress    Hospital Course: 55 y.o. female who presented to [facility] with past medical history of hypertension, type 2 diabetes, CKD stage IV, HFpEF, hyperlipidemia presented to the ED with chief complaint of respiratory distress.     History cannot be obtained due to patient being intubated sedated. On chart review patient had 3 or 4 arrival sitting in bed and also having some new onset dyspnea that was severe sudden onset and then started progressively turning blue in the lips for her  and EMS was called noted to have saturation 60% on muscles brought here emergently.   Patient in the ED noted was unable to protect her airway thus was emergently intubated for lifesaving measures.       Subjective:   Patient remains intubated and sedated, FiO2 30%, leukocytosis 22, on HD, failed leak test yesterday, creatinine 3.7, procalcitonin is 0.87      Medications:  Reviewed    Infusion Medications    dexmedetomidine 0.5 mcg/kg/hr (09/04/21 0735)    fentaNYL Stopped (09/03/21 1217)    dextrose      propofol 15 mcg/kg/min (09/04/21 0736)    sodium chloride Stopped (08/29/21 2301)     Scheduled Medications    cloNIDine  1 patch TransDERmal Weekly    methylPREDNISolone  40 mg IntraVENous Q6H    heparin (porcine)  5,000 Units SubCUTAneous 3 times per day    epoetin yohana-epbx  10,000 Units SubCUTAneous Every Other Day    furosemide  40 mg IntraVENous TID    insulin lispro  0-6 Units SubCUTAneous Q4H    pantoprazole  40 mg IntraVENous BID    sodium chloride flush  5-40 mL IntraVENous 2 times per day     PRN Meds: labetalol, albumin human, heparin (porcine), glucose, dextrose, glucagon (rDNA), dextrose, fentanNYL, sodium chloride flush, sodium chloride, ondansetron **OR** ondansetron, polyethylene glycol, acetaminophen **OR** acetaminophen, hydrALAZINE      Intake/Output Summary (Last 24 hours) at 9/4/2021 1146  Last data filed at 9/4/2021 0810  Gross per 24 hour   Intake 549.25 ml   Output 690 ml   Net -140.75 ml       Physical Exam Performed:    BP (!) 176/68   Pulse 79   Temp 97 °F (36.1 °C)   Resp 16   Ht 5' 6\" (1.676 m)   Wt 198 lb 6.6 oz (90 kg)   SpO2 96%   BMI 32.02 kg/m²     General appearance: Appears comfortable on the vent looks approximately her stated age. HEENT: Pupils equal, round, and reactive to light. Conjunctivae/corneas clear. Neck: Supple, with full range of motion. No jugular venous distention. Trachea midline. Respiratory: She is vented and has some rales present at the bases bilaterally on her exam.  Eezes/Rhonchi. Cardiovascular: Regular rate and rhythm with normal S1/S2 without murmurs, rubs or gallops. Abdomen: Soft, non-tender, non-distended with normal bowel sounds. Musculoskeletal: No clubbing, cyanosis or edema bilaterally. Full range of motion without deformity. Skin: Skin color, texture, turgor normal.  No rashes or lesions. Neurologic: Intubated and sedated  Psychiatric: Intubated and sedated  Capillary Refill: Brisk,3 seconds, normal   Peripheral Pulses: +2 palpable, equal bilaterally       Labs:   Recent Labs     09/02/21 0530 09/03/21 0356 09/04/21  0330   WBC 9.9 16.2* 22.9*   HGB 7.4* 7.9* 7.3*   HCT 21.8* 24.6* 23.3*    191 199     Recent Labs     09/02/21 0530 09/03/21  0356 09/04/21  0330    142 136   K 3.6 4.0 4.3    110 104   CO2 18* 17* 16*   BUN 22* 16 27*   CREATININE 3.3* 2.8* 3.7*   CALCIUM 8.1* 8.5 8.6   PHOS 3.9 3.4 6.3*     Recent Labs     09/02/21 0530 09/03/21  0356 09/04/21  0330   AST 9* 13* 9*   ALT 6* 8* 6*   BILITOT <0.2 <0.2 <0.2   ALKPHOS 112 127 119     No results for input(s): INR in the last 72 hours.   Recent Labs     09/02/21  0530 09/03/21  0356 09/04/21  0330   CKTOTAL 43 56 40       Urinalysis:      Lab Results   Component Value Date    NITRU Negative 08/27/2021    WBCUA 7 08/27/2021 RBCUA 3 08/27/2021    BLOODU SMALL 08/27/2021    SPECGRAV 1.013 08/27/2021    GLUCOSEU 250 08/27/2021       Radiology:  XR CHEST PORTABLE   Final Result   Persistent pulmonary edema and left basilar atelectasis or airspace disease. Persistent small left pleural effusion. Indeterminate positioning of left internal jugular catheter, though similar   to prior. XR CHEST PORTABLE   Final Result   Stable support apparatus. Persistent findings suggestive of pulmonary edema with small bilateral   pleural effusions, left greater than right. There is more focal consolidation in the left lung base, atelectasis versus   pneumonia. XR CHEST PORTABLE   Final Result   There has been placement of a right internal jugular temporary dialysis   catheter with tip over the upper right atrium. Remainder of support   apparatus is stable in appearance. Persistent findings of pulmonary edema with probable small bilateral pleural   effusions. IR FLUORO GUIDED CVA DEVICE PLMT/REPLACE/REMOVAL   Final Result   Successful ultrasound and fluoroscopy guided placement of a temporary   dialysis catheter. XR CHEST PORTABLE   Final Result   Extensive bilateral pulmonary disease and pleural effusion, no significant   change over the past 48 hours. XR CHEST PORTABLE   Final Result   Enlargement of the cardiac silhouette with central vascular congestion as   well as bibasilar infiltrates and pleural effusions, which may represent a   combination of pulmonary edema as well as potential pneumonia. XR CHEST PORTABLE   Final Result   Endotracheal tube and enteric tube are in satisfactory position. The left IJ central venous catheter tip projects over the area of the left   brachiocephalic vein. Right basilar airspace disease. Left basilar opacification reflecting airspace disease, atelectasis or   pleural effusion.          CT CHEST WO CONTRAST   Final Result      CT HEAD WO CONTRAST   Final Result      NM LUNG VENT/PERFUSION (VQ)    (Results Pending)   VL Renal Arterial Duplex Complete    (Results Pending)           Assessment/Plan:    Active Hospital Problems    Diagnosis     Acute respiratory failure ([facility] [address] [J96.00]     Acute on chronic diastolic heart failure (HCC) [I50.33]     Chronic kidney disease (CKD), stage III (moderate) (HCC) [N18.30]     Chronic diastolic (congestive) heart failure (HCC) [I50.32]     Pulmonary edema [J81.1]        Acute hypoxic respiratory failure requiring mechanical ventilation  Continue to monitor  Covid testing negative  Completed course of cefepime, leukocytosis improving  CCM obtained procalcitonin 0.87, leukocytosis WBC 22, though patient started yesterday on steroids since she failed leak test     Acute pulmonary edema  proBNP trending down, patient was on Lasix per pulmonary  Creatinine improving on Lasix 40 mg twice daily, nephrology following. Currently on HD    Acute on chronic HFpEF exacerbation  Has been on Lasix 3 times daily  Nephro is following    Diastolic dysfunction  Patient admitted with hypoxic respiratory failure  Cardiology following recommending [facility] when medically stable probably next week     Pneumonia  Earlier this admission suspected on CT imaging with leukocytosis  Completed 5-day course of cefepime    Sepsis  Leukocytosis, worsening  Procalcitonin 0.87, sputum culture, currently off antibiotic  Had completed a course of cefepime. Blood culture grew staph epidermidis in 1 culture. Probable contamination     Hypertensive urgency, improving  On lasix, monitor BP     Acute on chronic renal failure  Being followed by nephrology  Had started HD on 8/31 with UF 1.8 L    Getting HD  Negative balance of 9 L    Normocytic anemia  Iron panel ordered, Hemoccult     Chronic medical conditions  Type 2 Diabetes: Insulin sliding scale  Hyperlipidemia: Continue home medication  Class II obesity:Complicating assessment and treatment. Placing patient at risk for multiple co-morbidities as well as early death and contributing to the patient's presentation. Education, and counseling     DVT Prophylaxis: heparin SQ  Diet: Diet NPO  ADULT TUBE FEEDING; Orogastric; Renal Formula; Continuous; 15; No; 30; Q 4 hours  Code Status: Full Code    PT/OT Eval Status: After extubation    Due to the immediate potential for life-threatening deterioration due to acute hypoxic respiratory failure requiring mechanical ventilation, I spent 33 minutes providing critical care. This time is excluding time spent performing procedures.       Abdoul Hung MD

## 2021-09-04 NOTE — PROGRESS NOTES
[address]   Progress Note  Cardiology    Chief Complaint   Patient presents with    Respiratory Distress     Pt brought in per [facility] after call from  for resp distress. O2 sat 70's, BMV in route. Pt will open eyes to voice. Pupils 8mm and fixed. HPI: Remains intubated after failing a leak test and now on steroids for tracheal edema. .  Cardiology plan is for cath when she is stable but not until next week. Presently with  A sinus tach on the monitor. No previous coronary evaluation in the setting of a pt with severe dm and blindness, recurrent infections in the past, CRF with nephrotic syndrome and hx of smoking. Trops on admit with elevated and flat ~0.22. Ekg is unremarkable. Creat is up today and dialysis is in progress,   Medications/Labs all Reviewed    Recent Labs     09/04/21  0330   WBC 22.9*   HGB 7.3*   HCT 23.3*   MCV 89.8        Recent Labs     09/04/21  0330   CREATININE 3.7*   BUN 27*      K 4.3      CO2 16*     No results for input(s): INR, PROTIME in the last 72 hours.      MEDICATIONS:    cloNIDine  1 patch TransDERmal Weekly    methylPREDNISolone  40 mg IntraVENous Q6H    heparin (porcine)  5,000 Units SubCUTAneous 3 times per day    epoetin yohana-epbx  10,000 Units SubCUTAneous Every Other Day    furosemide  40 mg IntraVENous TID    insulin lispro  0-6 Units SubCUTAneous Q4H    pantoprazole  40 mg IntraVENous BID    sodium chloride flush  5-40 mL IntraVENous 2 times per day      dexmedetomidine 0.5 mcg/kg/hr (09/04/21 0735)    fentaNYL Stopped (09/03/21 1217)    dextrose      propofol 15 mcg/kg/min (09/04/21 0736)    sodium chloride Stopped (08/29/21 2301)       Physical Examination:    BP (!) 172/68   Pulse 78   Temp 98.8 °F (37.1 °C) (Bladder)   Resp 16   Ht 5' 6\" (1.676 m)   Wt 193 lb 12.6 oz (87.9 kg)   SpO2 99%   BMI 31.28 kg/m²     Respiratory:  · Resp Assessment: assisted by the vent  · Resp Auscultation: Clear to auscultation bilaterally   Cardiovascular:  · Auscultation: regular rate and rhythm, normal S1S2, no murmur, rub or gallop  · Palpation:  Nl PMI  · JVP:  normal  · Extremities: No Edema  Abdomen:  · Soft, non-tender  · Normal bowel sounds  Extremities:  ·  No Cyanosis or Clubbing  Neurological/Psychiatric:  · Oriented to time, place, and person  · Non-anxious  Skin Warm and dry      ECHO:  Left ventricular systolic function is normal with ejection fraction   estimated at 55-60 %. No regional wall motion abnormalities are noted. There is mild left ventricular hypertrophy. Normal left ventricular diastolic filling pressure. Moderate mitral regurgitation. Mild pulmonic regurgitation present. IVC size is dilated (>2.1 cm) but collapses > 50% with respiration   consistent with elevated RA pressure (8 mmHg). **There is a small-moderate bilateral pleural effusion. **There is a small circumferential pericardial effusion noted. Previous echo done 2020 - EF 55%  Assessment:    Active Problems:        Acute respiratory failure (HCC)  Plan: sudden hypoxic resp failure. No COVID. H/o CKD with KINZA. Severely elevated BNP but LV function essentially normal.  Mild diastolic dysfunction. Recommend [facility] when medically stable and extubated on minimal respiratory support (probably next week). Now that she is on HD can dialyze contrast from cath. Elevated troponin could be from KINZA or respiratory failure. Likely mix aspiration PNA pulm edema from renal failure. CXR with pulmonary edema and small left pleural effusion. Severe DM with complications which has been longstanding and for which she has been poorly compliant    Nephrotic syndrome - now on dialysis daily -9L but may be inaccurate as she is down 19 kg by daily weights. Hypertension - I will start a clonidine patch and order labetalol for prn use with her tachycardia.      Miguelina Sanders MD, 9/4/2021 8:39 AM

## 2021-09-04 NOTE — PROGRESS NOTES
PULMONARY AND CRITICAL CARE MEDICINE PROGRESS NOTE    Subjective: Patient is sedated with low-dose fentanyl and Precedex. Remains on the mechanical ventilator. Currently undergoing hemodialysis. ROS could not be obtained due to patient factors. MEDICATIONS:     Scheduled Meds:   cloNIDine  1 patch TransDERmal Weekly    methylPREDNISolone  40 mg IntraVENous Q6H    heparin (porcine)  5,000 Units SubCUTAneous 3 times per day    epoetin yohana-epbx  10,000 Units SubCUTAneous Every Other Day    furosemide  40 mg IntraVENous TID    insulin lispro  0-6 Units SubCUTAneous Q4H    pantoprazole  40 mg IntraVENous BID    sodium chloride flush  5-40 mL IntraVENous 2 times per day       Current Infusions:    dexmedetomidine 0.3 mcg/kg/hr (09/04/21 1235)    fentaNYL Stopped (09/03/21 1217)    dextrose      propofol 5 mcg/kg/min (09/04/21 1236)    sodium chloride Stopped (08/29/21 2301)       PRN meds:  labetalol, albumin human, heparin (porcine), glucose, dextrose, glucagon (rDNA), dextrose, fentanNYL, sodium chloride flush, sodium chloride, ondansetron **OR** ondansetron, polyethylene glycol, acetaminophen **OR** acetaminophen, hydrALAZINE    PHYSICAL EXAM:  BP (!) 166/66   Pulse 87   Temp 98.5 °F (36.9 °C) (Bladder)   Resp 15   Ht 5' 6\" (1.676 m)   Wt 192 lb 14.4 oz (87.5 kg)   SpO2 98%   BMI 31.14 kg/m²  I/O last 3 completed shifts: In: 549.3 [I.V.:549.3]  Out: 690 [Urine:690] I/O this shift: In: 830 [I.V.:30]  Out: 3500 [Urine:100]      Intake/Output Summary (Last 24 hours) at 9/4/2021 1311  Last data filed at 9/4/2021 1200  Gross per 24 hour   Intake 1349.25 ml   Output 3965 ml   Net -2615.75 ml       CONSTITUTIONAL: She is a 55y.o.-year-old who appears her stated age. She is  in no acute distress. CARDIOVASCULAR: S1 S2 RRR. Without murmer  RESPIRATORY & CHEST: Lungs are clear to auscultation and percussion. No wheezing, no crackles.  Good air movement  GASTROINTESTINAL & ABDOMEN: Soft, nontender, positive bowel sounds in all quadrants, non-distended, without hepatosplenomegaly. GENITOURINARY: Deferred. MUSCULOSKELETAL: No tenderness to palpation of the axial skeleton. There is no clubbing. No cyanosis. No edema of the lower extremities. SKIN OF BODY: No rash or jaundice. PSYCHIATRIC EVALUATION: Could not be assessed. HEMATOLOGIC/LYMPHATIC/ IMMUNOLOGIC: No palpable lymphadenopathy. NEUROLOGIC: Sedated. Groslly non-focal. Motor strength is 5+/5 in all muscle groups. The patient has a normal sensorium globally. LABS:    Recent Labs     09/02/21  0530 09/03/21  0356 09/04/21  0330   WBC 9.9 16.2* 22.9*   RBC 2.48* 2.77* 2.60*   HGB 7.4* 7.9* 7.3*   HCT 21.8* 24.6* 23.3*   MCH 29.9 28.6 27.9   MCHC 34.0 32.2 31.1   RDW 16.1* 15.9* 16.3*    191 199   MPV 8.7 9.0 8.5   NEUTOPHILPCT 66.7 64.7 79.0   MONOPCT 9.6 11.3 3.0   BASOPCT 0.4 0.4 0.0     Recent Labs     09/02/21  0530 09/03/21  0356 09/04/21  0330    142 136   K 3.6 4.0 4.3    110 104   ANIONGAP 13 15 16   CO2 18* 17* 16*   BUN 22* 16 27*   CREATININE 3.3* 2.8* 3.7*   CALCIUM 8.1* 8.5 8.6   PROT 5.4* 5.9* 5.7*   BILITOT <0.2 <0.2 <0.2   ALKPHOS 112 127 119   ALT 6* 8* 6*   AST 9* 13* 9*   GLUCOSE 110* 125* 188*       IMPRESSION:  1. Acute hypoxemic respiratory failure  2. Acute on chronic kidney disease  3. Acute pulmonary edema      PLAN:  Patient is currently sedated with low-dose fentanyl and Precedex. Plan will be to wean sedation off after hemodialysis for vent wean. Currently on AC/VC mode. FiO2 0.3 with PEEP of 5. I have switched her to pressure support ventilation with pressure support 10 and PEEP 5. Chest x-ray from 9/3 showed improved edema. I personally viewed and interpreted the images. Patient has good air leak. Plan to extubate after hemodialysis. Undergoing hemodialysis. Nephrology is following. Echo with good LV function and no significant diastolic dysfunction.   On Lasix 40 IV every 8. Low-grade fever. Worsening leukocytosis up to 22,000 (?  Steroids). Rising procalcitonin. Sputum culture is pending. Not on antibiotics at this point but she did have a course of cefepime for 5 days and a few days of Zyvox earlier in her hospitalization. Discontinue Solu-Medrol. Critical Care Time: 35 Minutes  Total critical care time caring for this patient with life threatening illness, including direct patient contact, management of life support systems, review of data including imaging and labs, discussions with other team members and physicians excluding procedures. Lori Lara MD  Pulmonary Critical Care and Sleep Medicine  9/4/2021, 1:11 PM    This note was completed using dragon medical speech recognition software. Grammatical errors, random word insertions, pronoun errors and incomplete sentences are occasional consequences of this technology due to software limitations. If there are questions or concerns about the content of this note of information contained within the body of this dictation they should be addressed with the provider for clarification.

## 2021-09-05 LAB
A/G RATIO: 0.8 (ref 1.1–2.2)
ALBUMIN SERPL-MCNC: 2.6 G/DL (ref 3.4–5)
ALP BLD-CCNC: 119 U/L (ref 40–129)
ALT SERPL-CCNC: 8 U/L (ref 10–40)
ANION GAP SERPL CALCULATED.3IONS-SCNC: 17 MMOL/L (ref 3–16)
ANISOCYTOSIS: ABNORMAL
APTT: 38.3 SEC (ref 26.2–38.6)
AST SERPL-CCNC: 12 U/L (ref 15–37)
BANDED NEUTROPHILS RELATIVE PERCENT: 1 % (ref 0–7)
BASOPHILS ABSOLUTE: 0 K/UL (ref 0–0.2)
BASOPHILS RELATIVE PERCENT: 0 %
BILIRUB SERPL-MCNC: <0.2 MG/DL (ref 0–1)
BUN BLDV-MCNC: 29 MG/DL (ref 7–20)
CALCIUM IONIZED: 1.2 MMOL/L (ref 1.12–1.32)
CALCIUM SERPL-MCNC: 9 MG/DL (ref 8.3–10.6)
CHLORIDE BLD-SCNC: 106 MMOL/L (ref 99–110)
CO2: 16 MMOL/L (ref 21–32)
CREAT SERPL-MCNC: 3.3 MG/DL (ref 0.6–1.1)
EOSINOPHILS ABSOLUTE: 0 K/UL (ref 0–0.6)
EOSINOPHILS RELATIVE PERCENT: 0 %
GFR AFRICAN AMERICAN: 18
GFR NON-AFRICAN AMERICAN: 15
GLOBULIN: 3.4 G/DL
GLUCOSE BLD-MCNC: 122 MG/DL (ref 70–99)
GLUCOSE BLD-MCNC: 123 MG/DL (ref 70–99)
GLUCOSE BLD-MCNC: 126 MG/DL (ref 70–99)
GLUCOSE BLD-MCNC: 127 MG/DL (ref 70–99)
GLUCOSE BLD-MCNC: 130 MG/DL (ref 70–99)
GLUCOSE BLD-MCNC: 136 MG/DL (ref 70–99)
GLUCOSE BLD-MCNC: 139 MG/DL (ref 70–99)
HCT VFR BLD CALC: 23.9 % (ref 36–48)
HEMOGLOBIN: 7.5 G/DL (ref 12–16)
LACTIC ACID: 0.7 MMOL/L (ref 0.4–2)
LYMPHOCYTES ABSOLUTE: 3.7 K/UL (ref 1–5.1)
LYMPHOCYTES RELATIVE PERCENT: 13 %
MAGNESIUM: 2.2 MG/DL (ref 1.8–2.4)
MCH RBC QN AUTO: 28 PG (ref 26–34)
MCHC RBC AUTO-ENTMCNC: 31.1 G/DL (ref 31–36)
MCV RBC AUTO: 90 FL (ref 80–100)
METAMYELOCYTES RELATIVE PERCENT: 2 %
MONOCYTES ABSOLUTE: 1.7 K/UL (ref 0–1.3)
MONOCYTES RELATIVE PERCENT: 6 %
NEUTROPHILS ABSOLUTE: 23.2 K/UL (ref 1.7–7.7)
NEUTROPHILS RELATIVE PERCENT: 78 %
NUCLEATED RED BLOOD CELLS: 5 /100 WBC
PDW BLD-RTO: 16.1 % (ref 12.4–15.4)
PERFORMED ON: ABNORMAL
PH VENOUS: 7.36 (ref 7.35–7.45)
PHOSPHORUS: 4.4 MG/DL (ref 2.5–4.9)
PLATELET # BLD: 292 K/UL (ref 135–450)
PLATELET SLIDE REVIEW: ADEQUATE
PMV BLD AUTO: 8.5 FL (ref 5–10.5)
POTASSIUM SERPL-SCNC: 3.8 MMOL/L (ref 3.5–5.1)
PRO-BNP: ABNORMAL PG/ML (ref 0–124)
RBC # BLD: 2.66 M/UL (ref 4–5.2)
SLIDE REVIEW: ABNORMAL
SODIUM BLD-SCNC: 139 MMOL/L (ref 136–145)
TOTAL CK: 34 U/L (ref 26–192)
TOTAL PROTEIN: 6 G/DL (ref 6.4–8.2)
WBC # BLD: 28.6 K/UL (ref 4–11)

## 2021-09-05 PROCEDURE — 87040 BLOOD CULTURE FOR BACTERIA: CPT

## 2021-09-05 PROCEDURE — 85730 THROMBOPLASTIN TIME PARTIAL: CPT

## 2021-09-05 PROCEDURE — 6360000002 HC RX W HCPCS: Performed by: INTERNAL MEDICINE

## 2021-09-05 PROCEDURE — 99291 CRITICAL CARE FIRST HOUR: CPT | Performed by: INTERNAL MEDICINE

## 2021-09-05 PROCEDURE — 83880 ASSAY OF NATRIURETIC PEPTIDE: CPT

## 2021-09-05 PROCEDURE — 92526 ORAL FUNCTION THERAPY: CPT

## 2021-09-05 PROCEDURE — 84100 ASSAY OF PHOSPHORUS: CPT

## 2021-09-05 PROCEDURE — 83735 ASSAY OF MAGNESIUM: CPT

## 2021-09-05 PROCEDURE — 2500000003 HC RX 250 WO HCPCS: Performed by: INTERNAL MEDICINE

## 2021-09-05 PROCEDURE — 99232 SBSQ HOSP IP/OBS MODERATE 35: CPT | Performed by: INTERNAL MEDICINE

## 2021-09-05 PROCEDURE — C9113 INJ PANTOPRAZOLE SODIUM, VIA: HCPCS | Performed by: INTERNAL MEDICINE

## 2021-09-05 PROCEDURE — 94761 N-INVAS EAR/PLS OXIMETRY MLT: CPT

## 2021-09-05 PROCEDURE — 82330 ASSAY OF CALCIUM: CPT

## 2021-09-05 PROCEDURE — 2000000000 HC ICU R&B

## 2021-09-05 PROCEDURE — 2580000003 HC RX 258: Performed by: INTERNAL MEDICINE

## 2021-09-05 PROCEDURE — 85025 COMPLETE CBC W/AUTO DIFF WBC: CPT

## 2021-09-05 PROCEDURE — 92610 EVALUATE SWALLOWING FUNCTION: CPT

## 2021-09-05 PROCEDURE — 83605 ASSAY OF LACTIC ACID: CPT

## 2021-09-05 PROCEDURE — 82550 ASSAY OF CK (CPK): CPT

## 2021-09-05 PROCEDURE — 80053 COMPREHEN METABOLIC PANEL: CPT

## 2021-09-05 RX ORDER — FLUTICASONE PROPIONATE 50 MCG
1 SPRAY, SUSPENSION (ML) NASAL PRN
Status: DISCONTINUED | OUTPATIENT
Start: 2021-09-05 | End: 2021-09-13 | Stop reason: HOSPADM

## 2021-09-05 RX ADMIN — LABETALOL HYDROCHLORIDE 10 MG: 5 INJECTION INTRAVENOUS at 16:57

## 2021-09-05 RX ADMIN — FUROSEMIDE 40 MG: 10 INJECTION, SOLUTION INTRAMUSCULAR; INTRAVENOUS at 08:35

## 2021-09-05 RX ADMIN — LABETALOL HYDROCHLORIDE 10 MG: 5 INJECTION INTRAVENOUS at 22:12

## 2021-09-05 RX ADMIN — FUROSEMIDE 40 MG: 10 INJECTION, SOLUTION INTRAMUSCULAR; INTRAVENOUS at 14:52

## 2021-09-05 RX ADMIN — Medication 10 ML: at 08:43

## 2021-09-05 RX ADMIN — Medication 10 ML: at 06:08

## 2021-09-05 RX ADMIN — PANTOPRAZOLE SODIUM 40 MG: 40 INJECTION, POWDER, FOR SOLUTION INTRAVENOUS at 08:35

## 2021-09-05 RX ADMIN — LABETALOL HYDROCHLORIDE 10 MG: 5 INJECTION INTRAVENOUS at 11:41

## 2021-09-05 RX ADMIN — FUROSEMIDE 40 MG: 10 INJECTION, SOLUTION INTRAMUSCULAR; INTRAVENOUS at 20:25

## 2021-09-05 RX ADMIN — PANTOPRAZOLE SODIUM 40 MG: 40 INJECTION, POWDER, FOR SOLUTION INTRAVENOUS at 20:27

## 2021-09-05 RX ADMIN — HEPARIN SODIUM 5000 UNITS: 5000 INJECTION INTRAVENOUS; SUBCUTANEOUS at 06:07

## 2021-09-05 RX ADMIN — Medication 10 ML: at 08:36

## 2021-09-05 RX ADMIN — HEPARIN SODIUM 5000 UNITS: 5000 INJECTION INTRAVENOUS; SUBCUTANEOUS at 22:11

## 2021-09-05 RX ADMIN — HEPARIN SODIUM 5000 UNITS: 5000 INJECTION INTRAVENOUS; SUBCUTANEOUS at 14:52

## 2021-09-05 RX ADMIN — Medication 10 ML: at 16:57

## 2021-09-05 RX ADMIN — LORAZEPAM 0.5 MG: 2 INJECTION INTRAMUSCULAR; INTRAVENOUS at 06:07

## 2021-09-05 RX ADMIN — LABETALOL HYDROCHLORIDE 10 MG: 5 INJECTION INTRAVENOUS at 02:16

## 2021-09-05 RX ADMIN — HYDRALAZINE HYDROCHLORIDE 10 MG: 20 INJECTION INTRAMUSCULAR; INTRAVENOUS at 19:42

## 2021-09-05 RX ADMIN — Medication 10 ML: at 08:40

## 2021-09-05 RX ADMIN — LORAZEPAM 0.5 MG: 2 INJECTION INTRAMUSCULAR; INTRAVENOUS at 00:38

## 2021-09-05 RX ADMIN — LABETALOL HYDROCHLORIDE 10 MG: 5 INJECTION INTRAVENOUS at 06:07

## 2021-09-05 RX ADMIN — Medication 10 ML: at 20:25

## 2021-09-05 ASSESSMENT — PAIN SCALES - GENERAL
PAINLEVEL_OUTOF10: 0

## 2021-09-05 NOTE — PROGRESS NOTES
PULMONARY AND CRITICAL CARE MEDICINE PROGRESS NOTE    Subjective: Patient has done well post extubation. She is now on nasal cannula and saturating well. REVIEW OF SYSTEMS:   Constitutional symptoms: The patient denies fever, fatigue, night sweats, weight loss or weight gain. HEENT: No vision changes. No tinnitus, Denies sinus pain. No hoarseness, or dysphagia. Neck: Patient denies swelling in the neck. Cardiovascular: Denies chest pain, palpitation, syncope. Respiratory: Denies shortness of breath or cough. Gastrointestinal: Denies nausea, abdominal pain or change in bowel function. Genitourinary: Denies obstructive symptoms. No history of incontinence. Skin: No rashes or itching. Muskuloskeletal: Denies weakness or bone pain. Neurological: No headaches or seizures. Psychiatric: Denies mood swings or depression. MEDICATIONS:     Scheduled Meds:   cloNIDine  1 patch TransDERmal Weekly    heparin (porcine)  5,000 Units SubCUTAneous 3 times per day    epoetin yohana-epbx  10,000 Units SubCUTAneous Every Other Day    furosemide  40 mg IntraVENous TID    insulin lispro  0-6 Units SubCUTAneous Q4H    pantoprazole  40 mg IntraVENous BID    sodium chloride flush  5-40 mL IntraVENous 2 times per day       Current Infusions:    dextrose      sodium chloride Stopped (08/29/21 2301)       PRN meds:  fluticasone, LORazepam, labetalol, albumin human, heparin (porcine), glucose, dextrose, glucagon (rDNA), dextrose, fentanNYL, sodium chloride flush, sodium chloride, ondansetron **OR** ondansetron, polyethylene glycol, acetaminophen **OR** acetaminophen, hydrALAZINE    PHYSICAL EXAM:  BP (!) 163/77   Pulse 101   Temp 98.3 °F (36.8 °C) (Bladder)   Resp 19   Ht 5' 6\" (1.676 m)   Wt 185 lb 13.6 oz (84.3 kg)   SpO2 96%   BMI 30.00 kg/m²  I/O last 3 completed shifts:   In: 830 [I.V.:30]  Out: 4297 [Urine:535] I/O this shift:  In: 30 [I.V.:30]  Out: 360 [Urine:360]      Intake/Output Summary (Last 24 hours) at 9/5/2021 1352  Last data filed at 9/5/2021 1147  Gross per 24 hour   Intake 30 ml   Output 795 ml   Net -765 ml       CONSTITUTIONAL: She is a 55y.o.-year-old who appears her stated age. She is alert and oriented x 3 and in no acute distress. CARDIOVASCULAR: S1 S2 RRR. Without murmer  RESPIRATORY & CHEST: Lungs are clear to auscultation and percussion. No wheezing, no crackles. Good air movement  GASTROINTESTINAL & ABDOMEN: Soft, nontender, positive bowel sounds in all quadrants, non-distended, without hepatosplenomegaly. GENITOURINARY: Deferred. MUSCULOSKELETAL: No tenderness to palpation of the axial skeleton. There is no clubbing. No cyanosis. No edema of the lower extremities. SKIN OF BODY: No rash or jaundice. PSYCHIATRIC EVALUATION: Normal affect. Patient answers questions appropriately. HEMATOLOGIC/LYMPHATIC/ IMMUNOLOGIC: No palpable lymphadenopathy. NEUROLOGIC: Alert and oriented x 3. Groslly non-focal. Motor strength is 5+/5 in all muscle groups. The patient has a normal sensorium globally. LABS:    Recent Labs     09/03/21 0356 09/04/21 0330 09/05/21 0355   WBC 16.2* 22.9* 28.6*   NRBC  --   --  5*   RBC 2.77* 2.60* 2.66*   HGB 7.9* 7.3* 7.5*   HCT 24.6* 23.3* 23.9*   MCH 28.6 27.9 28.0   MCHC 32.2 31.1 31.1   RDW 15.9* 16.3* 16.1*    199 292   MPV 9.0 8.5 8.5   NEUTOPHILPCT 64.7 79.0 78.0   MONOPCT 11.3 3.0 6.0   BASOPCT 0.4 0.0 0.0     Recent Labs     09/03/21  0356 09/04/21 0330 09/05/21 0355    136 139   K 4.0 4.3 3.8    104 106   ANIONGAP 15 16 17*   CO2 17* 16* 16*   BUN 16 27* 29*   CREATININE 2.8* 3.7* 3.3*   CALCIUM 8.5 8.6 9.0   PROT 5.9* 5.7* 6.0*   BILITOT <0.2 <0.2 <0.2   ALKPHOS 127 119 119   ALT 8* 6* 8*   AST 13* 9* 12*   GLUCOSE 125* 188* 136*          IMPRESSION:  1. Acute hypoxemic respiratory failure  2. Acute on chronic kidney disease  3.  Acute pulmonary edema        PLAN:  Patient has done well after liberation from the ventilator. She is now on nasal cannula and saturating well. Failed swallow evaluation. Speech is following. Out of bed in the chair. Undergoing hemodialysis. Nephrology is following. Echo with good LV function and no significant diastolic dysfunction. On Lasix 40 IV every 8.   Worsening leukocytosis up to 28,000 (?  Steroids now stopped). Rising procalcitonin. Sputum culture is pending. Not on antibiotics at this point but she did have a course of cefepime for 5 days and a few days of Zyvox earlier in her hospitalization. Repeat blood cultures have been sent. Critical care will sign off. Thank you for allowing us to participate in patient care.       Critical Care Time: 30 Minutes  Total critical care time caring for this patient with life threatening illness, including direct patient contact, management of life support systems, review of data including imaging and labs, discussions with other team members and physicians excluding procedures. Geno Mederos MD  Pulmonary Critical Care and Sleep Medicine  9/5/2021, 1:52 PM    This note was completed using dragon medical speech recognition software. Grammatical errors, random word insertions, pronoun errors and incomplete sentences are occasional consequences of this technology due to software limitations. If there are questions or concerns about the content of this note of information contained within the body of this dictation they should be addressed with the provider for clarification.

## 2021-09-05 NOTE — PLAN OF CARE
Pt free of falls. Fall prevention protocol in place. Wearing non-skid footwear, bed in lowest position and locked. Call light within reach. Bed alarm on.

## 2021-09-05 NOTE — PROGRESS NOTES
Pt heard Dr. Gabriela Saxena in [facility] and asked to speak to him. Dr. Carlos Burton in to see pt and pt was very tearful and stated she thought he was \"Darnell Mensah\". Pt very confused and speech inappropriate with cursing at times. Dr. Alannah LIND, given as ordered, see MAR.

## 2021-09-05 NOTE — PROGRESS NOTES
Facility/Department: [facility]  Initial Assessment  DYSPHAGIA BEDSIDE SWALLOW EVALUATION     Patient: Ignacia Johnson   : 1975   MRN: [id number]      Evaluation Date: 2021   Admitting Diagnosis: Acute respiratory failure (HCC) [J96.00]  Encephalopathy [G93.40]  Respiratory failure with hypoxia, unspecified chronicity ([address] [J96.91]  Pneumonia due to infectious organism, unspecified laterality, unspecified part of lung [J18.9]  Pain: Pt did npt report pain                         H&P:   55 y.o. female who presented to [facility] with past medical history of hypertension, type 2 diabetes, CKD stage IV, HFpEF, hyperlipidemia presented to the ED with chief complaint of respiratory distress.     History cannot be obtained due to patient being intubated sedated. On chart review patient had 3 or 4 arrival sitting in bed and also having some new onset dyspnea that was severe sudden onset and then started progressively turning blue in the lips for her  and EMS was called noted to have saturation 60% on muscles brought here emergently. Patient in the ED noted was unable to protect her airway thus was emergently intubated for lifesaving measures. Recent Chest xray (9/3/21): Impression   Persistent pulmonary edema and left basilar atelectasis or airspace disease. Persistent small left pleural effusion.       Indeterminate positioning of left internal jugular catheter, though similar   to prior. Recent Head CT (21):   CT HEAD IMPRESSION:   No acute hemorrhage or midline shift.       Increased ventricle size compared to prior exam.  Correlate with presentation   to exclude acute hydrocephalus.       Other findings as described. Modified Barium Swallow Study: N/A    History/Prior Level of Function:   Living Status: Pt lives at home with spouse    Prior Dysphagia History: Pt known to speech department from previous admission, 2020.   At that time, a Regular texture diet with thin dysphagia tx 3-5 times per week during acute care stay. 1. Pt will tolerate ongoing assessment of swallow function with diet upgrade as clinically indicated. 2. Pt will demonstrate understanding of aspiration risk and precautions via education/demonstration with occasional prompting  3.  If clinical s/s of aspiration/penetration continue to be noted, Pt will participate in Modified Barium Swallow Study       Dysphagia Therapeutic Intervention:  Bolus Control Exercise, Oral Care, Laryngeal Exercises , Pharyngeal Exercise, Diet Tolerance Monitoring , Patient/Family Education , Therapeutic Trials with SLP     Discharge Recommendations: Recommend ongoing speech therapy for dysphagia therapy upon discharge from hospital    Patient Positioning: Upright in bed    Current Diet Level (prior to evaluation): General diet    Respiratory Status:   [x]Room Air   []O2 via nasal cannula   []Other:    Dentition:  []Adequate  []Dentures   []Missing Many Teeth  []Edentulous  [x]Other: adequate lower dentition; edentulous upper    Baseline Vocal Quality:  []Normal  []Dysphonic   [x]Aphonic   []Hoarse  []Wet  [x]Weak  []Other:    Volitional Cough:  []Strong  [x]Weak  [x]Wet  []Absent  []Congested  []Other:    Volitional Swallow:   []Absent   [x]Delayed     []Adequate     []Required use of drink     Oral Mechanism Exam:  []WFL []Mild   [x] Moderate  [x]Severe  []To be assessed  Impaired:   [x]Left side      [x]Right side    [x]Labial ROM/Coordination    []Labial Symmetry   [x]Lingual ROM/Coordination   []Lingual Symmetry  []Gag  []Other:     Oral Phase: []WFL []Mild   [x] Moderate  [x]Severe  []To be assessed   [x]Impaired/Prolonged Mastication:   []Spillage Left:   []Spillage Right:  []Pocketing Left:   []Pocketing Right:   []Decreased Anterior to Posterior Transit:   [x]Suspected Premature Bolus Loss:   []Lingual/Palatal Residue:   []Other:     Pharyngeal Phase: []WFL []Mild   [] Moderate  [x]Severe  []To be assessed   [x]Delayed

## 2021-09-05 NOTE — PROGRESS NOTES
Procedure explained to patient. Left IJ discontinued per protocol while in bed. Pressure held X 10 minutes and dressing intact. No hematoma or no oozing noted. Vaseline guaze applied. Patient tolerated well. Cont to monitor. Blood culture collected from Right IJ port from vas cath.     Electronically signed by Anthony Wallace RN on 9/5/2021 at 1:53 PM

## 2021-09-05 NOTE — PROGRESS NOTES
[address]   Progress Note  Cardiology    Chief Complaint   Patient presents with    Respiratory Distress     Pt brought in per [facility] after call from  for resp distress. O2 sat 70's, BMV in route. Pt will open eyes to voice. Pupils 8mm and fixed. HPI:   No previous coronary evaluation in the setting of a pt with severe dm and blindness, recurrent infections in the past, CRF with nephrotic syndrome and hx of smoking. Trops on admit with elevated and flat ~0.22. Ekg is unremarkable. She has been extubated but remains severely volume overloaded, ? More dialysis today. BP and HR are up with her anxious state. No angina. Medications/Labs all Reviewed    Recent Labs     09/05/21  0355   WBC 28.6*   HGB 7.5*   HCT 23.9*   MCV 90.0        Recent Labs     09/05/21  0355   CREATININE 3.3*   BUN 29*      K 3.8      CO2 16*     No results for input(s): INR, PROTIME in the last 72 hours.      MEDICATIONS:    cloNIDine  1 patch TransDERmal Weekly    heparin (porcine)  5,000 Units SubCUTAneous 3 times per day    epoetin yohana-epbx  10,000 Units SubCUTAneous Every Other Day    furosemide  40 mg IntraVENous TID    insulin lispro  0-6 Units SubCUTAneous Q4H    pantoprazole  40 mg IntraVENous BID    sodium chloride flush  5-40 mL IntraVENous 2 times per day      dextrose      sodium chloride Stopped (08/29/21 2301)       Physical Examination:    BP (!) 163/69   Pulse 105   Temp 98.3 °F (36.8 °C) (Bladder)   Resp 19   Ht 5' 6\" (1.676 m)   Wt 185 lb 13.6 oz (84.3 kg)   SpO2 94%   BMI 30.00 kg/m²     Respiratory:  · Resp Assessment: normal  · Resp Auscultation: Clear to auscultation bilaterally   Cardiovascular:  · Auscultation: regular rate and rhythm, normal S1S2, no murmur, rub or gallop  · Palpation:  Nl PMI  · JVP:  normal  · Extremities: No Edema  Abdomen:  · Soft, non-tender  · Normal bowel sounds  Extremities:  ·  No Cyanosis or Clubbing  Neurological/Psychiatric:  · Oriented to time, place, and person  · Non-anxious  Skin Warm and dry      ECHO:  Left ventricular systolic function is normal with ejection fraction   estimated at 55-60 %. No regional wall motion abnormalities are noted. There is mild left ventricular hypertrophy. Normal left ventricular diastolic filling pressure. Moderate mitral regurgitation. Mild pulmonic regurgitation present. IVC size is dilated (>2.1 cm) but collapses > 50% with respiration   consistent with elevated RA pressure (8 mmHg). **There is a small-moderate bilateral pleural effusion. **There is a small circumferential pericardial effusion noted. Previous echo done 2020 - EF 55%  Assessment:    Active Problems:        Acute respiratory failure (HCC)  Plan: sudden hypoxic resp failure. No COVID. H/o CKD with KINZA. Severely elevated BNP but LV function essentially normal.  Mild diastolic dysfunction. Recommend LHC when medically stable and extubated on minimal respiratory support (probably next week). Now that she is on HD can dialyze contrast from cath. Elevated troponin could be from KINZA or respiratory failure. She needs to have more fluid removal prior to any angiography. Severe DM with complications which has been longstanding and for which she has been poorly compliant    Nephrotic syndrome - now on dialysis daily     Hypertension -She has been on a clonidine patch but hopefully she can start po meds after a swallow eval.  I suggested labetalol po.      Adis Willis MD, 9/5/2021 8:27 AM

## 2021-09-05 NOTE — PROGRESS NOTES
Pt removing gown and blood pressure cuff and pulling at equipment. Pt is very confused with inappropriate speech. Attempted to reorient to environment and situation, reassurance given. Ativan PRN given, see MAR.

## 2021-09-05 NOTE — PROGRESS NOTES
Brendon Saris, MD Lou Ahumada, MD Serina Heath, MD               Office: [phone]                      Fax: [phone]             0 [facility]                   NEPHROLOGY CVU INPATIENT PROGRESS NOTE:     PATIENT NAME: Gay Jay  : 1975  MRN: [id number]     Nephrology treatment plan update. Patient extubated. Intermittent confusion. More awake and alert. Tolerated hemodialysis treatment well. Right IJ. Repeat hemodialysis treatment tomorrow      Medications reviewed. All nephrotoxic medications have been discontinued. Hold Ace inhibitors till renal recovery is complete. Anemia-worsening-hemoglobin remained stable at 7.5  -Continue to monitor.  -May require transfusion. .  -Check iron levels. Poor nutritional status.  -Albumin is 2.5. Discussed with treatment team.  Discussed with RN. Patient critically ill   T.35 m               RECOMMENDATIONS:     - F/UP CXR today   - dialysis today- but UF only today-  - next HD - full on Saturday     - Needs a long term access, eventually prefered [facility] w/ h/o advanced CKD, likely will need long-term   -consult IR on Monday for [facility] placement     - continue Lasix too- Rx: IV lasix  40- TID - as making urine too   -Monitor acidosis,  -Hold home dose of aldactone, lisinopril,  -PNA Rx w/ iv abx    -BP is improving w/ hydralazine, use PRN for >150,  -vent mx per [facility]  - at higher risk for decompensation, needing closer monitoring. D/C plan from renal stand point:  - unclear, as critically ill     Have Discussed with pt's  in details.    Discussed with patient's treatment team-  CVU nurse, hospitalist-Dr. Filomena Mondragon      IMPRESSION:       Admitted for:  Acute respiratory failure ([address] [J96.00]  Encephalopathy [G93.40]  Respiratory failure with hypoxia, unspecified chronicity ([address] [J96.91]  Pneumonia due to infectious organism, unspecified laterality, unspecified part of lung [J18.9]      KINZA (on proteinuric CKD: 4): Worsening  - BL Scr-last known 2.5, in 2/2021,   ---> 4.6 on admission  -: Etiology of KINZA -presumed ATN in the setting of pneumonia, unclear if it this is advancing CKD    -appreciated assistance by Dr Catrachito Salas for Rt IJ non-TDC placement (8/31)   -Hemodialysis #1 8/31/2021      History of nephrotic range proteinuria,   - thought to be from diabetic nephropathy With CKD stage III -> so high risk of advanced CKD,  -Follows with Dr. Jorge Alicia  -Noncompliance, last follow-up was 2/2021 then lost for follow-up  -echo results -  IVC size is dilated (>2.1 cm) but collapses > 50% with respiration consistent with elevated RA pressure (8 mmHg). -Last echo-8/2020  moderate concentric LVH, normal size and function with EF of 36%, normal diastolic function      Associated problems:   Hypokalemia-needing repletion  Acidosis, non-anion gap-  Hypomagnesemia-  Hypophosphatemia  Anemia, chronic disease-worsening        Other major problems: Management per primary and other consulting teams.   //Acute hypoxic respiratory failure -needing intubation  // Multifocal airspace disease that likely represents a combination of aspiration/pneumonia and pulmonary edema  //Fluid overload  -COVID was ruled out  //History of uncontrolled diabetes last A1c was 11.3    // Hypertension un-controlled,       Hospital Problems         Last Modified POA    Pulmonary edema 8/28/2021 Yes    Chronic kidney disease (CKD), stage III (moderate) ([facility]) 8/28/2021 Yes    Chronic diastolic (congestive) heart failure ([address] 8/28/2021 Yes    Acute on chronic diastolic heart failure ([address] 8/28/2021 Yes    Acute respiratory failure ([address] 8/27/2021 Yes        : other supportive care :   - Check daily renal function panel with electrolytes-phosphorus  - Strict monitoring of I/Os, daily weight  - Renal feeds/diet  - Current medications reviewed. - Nephrotoxic medications have been discontinued. - Dose adjusted and appropriate.      - Dose meds for eGFR <15 mL/min/1.73m2 during KINZA    - Avoid heavy opioids due to renal failure - may use very low dose dilaudid / fentanyl with close monitoring of CNS and respiratory depression. Please refer to the orders. High Complexity. Multiple complex problems. Discussed with patient 's treatment team- ICU team, nurse     Thank you for allowing me to participate in this patient's care. Please do not hesitate to contact me anytime. We will follow along with you. Bette Lundy MD,  [facility] [address] [place]: [phone] or [address] [employer]  Fax: [phone]     Severally ill, at risk of impending organ failure needing ICU higher level of care/monitoring. Time spent  35 minutes that included face-to-face meeting/discussion with patient's treatment team (including primary/referring team and other consultants; included coordination of care with the treatment team; and review of patient's electronic medical records and ordering appropriates tests.         ========================================================   ========================================================   Subjective:   Patient currently still  ill, remains in ICU as remains intubated sedated,   HD tolerating ok so far,   UOP (+) noted 1.2L   Past medical, Surgical, Social, Family medical history reviewed by me. MEDICATIONS: reviewed by me. Medications Prior to Admission:  No current facility-administered medications on file prior to encounter.      Current Outpatient Medications on File Prior to Encounter   Medication Sig Dispense Refill    Dulaglutide 0.75 MG/0.5ML SOPN Inject 0.75 mg into the skin once a week 4 pen 1    ibuprofen (ADVIL;MOTRIN) 200 MG CAPS Take 1 capsule by mouth      furosemide (LASIX) 80 MG tablet Take 80 mg by mouth every morning 80mg in the morning 40mg in the evening      amLODIPine (NORVASC) 10 MG tablet Take 1 tablet by mouth daily 30 tablet 1    furosemide (LASIX) 40 MG tablet Take 1 tablet by mouth every evening 30 tablet 1    spironolactone (ALDACTONE) 50 MG tablet Take 1 tablet by mouth daily 30 tablet 2    insulin glargine (LANTUS;BASAGLAR) 100 UNIT/ML injection pen Inject 30 Units into the skin daily 4 pen 2    lisinopril (PRINIVIL;ZESTRIL) 40 MG tablet Take 1 tablet by mouth daily 30 tablet 2    atorvastatin (LIPITOR) 40 MG tablet Take 1 tablet by mouth nightly 30 tablet 3    Insulin Pen Needle 29G X 12.7MM MISC 1 each by Does not apply route daily 100 each 5    propranolol (INDERAL LA) 80 MG extended release capsule Take 1 capsule by mouth every morning 30 capsule 2    LORazepam (ATIVAN) 0.5 MG tablet Take 0.5 mg by mouth 2 times daily as needed for Anxiety.  aspirin 81 MG EC tablet Take 1 tablet by mouth daily 30 tablet 3    pregabalin (LYRICA) 75 MG capsule Take 1 capsule by mouth nightly for 7 days. 7 capsule 0    sertraline (ZOLOFT) 50 MG tablet Take 1 tablet by mouth daily 30 tablet 3    glucose monitoring kit (FREESTYLE) monitoring kit 1 kit by Does not apply route daily 1 kit 0    insulin lispro, 1 Unit Dial, 100 UNIT/ML SOPN Inject 0-6 Units into the skin 3 times daily (with meals) **Corrective Low Dose Algorithm**  Glucose: Dose:               No Insulin  140-199 1 Unit  200-249 2 Units  250-299 3 Units  300-349 4 Units  350-399 5 Units  Over 399 6 Units (Patient taking differently: Inject 6-12 Units into the skin 3 times daily (with meals) **Corrective Low Dose Algorithm**  Glucose: Dose:               No Insulin  140-199 1 Unit  200-249 2 Units  250-299 3 Units  300-349 4 Units  350-399 5 Units  Over 399 6 Units) 3 pen 0    glucose blood VI test strips (VICTORY AGM-4000 TEST) strip Test four times daily until controlled and then three times daily.   Code 250.02  ONE TOUCH ULTRA MONITOR 100 strip 12         Current Facility-Administered Medications:     LORazepam (ATIVAN) injection 0.5 mg, 0.5 mg, IntraVENous, Q4H PRN, Calvin West MD, 0.5 mg at 09/05/21 0607    cloNIDine (CATAPRES) 0.2 MG/24HR 1 patch, 1 patch, TransDERmal, Weekly, Thien Reyez MD, 1 patch at 09/03/21 1145    labetalol (NORMODYNE;TRANDATE) injection 10 mg, 10 mg, IntraVENous, Q4H PRN, Thien Reyez MD, 10 mg at 09/05/21 0607    heparin (porcine) injection 5,000 Units, 5,000 Units, SubCUTAneous, 3 times per day, Bin Cazares MD, 5,000 Units at 09/05/21 0607    albumin human 25 % IV solution 25 g, 25 g, IntraVENous, PRN, Lauren Joyner MD, Stopped at 09/01/21 1439    epoetin yohana-epbx (RETACRIT) injection 10,000 Units, 10,000 Units, SubCUTAneous, Every Other Day, Lauren Joyner MD, 10,000 Units at 09/04/21 1103    heparin (porcine) injection 2,400 Units, 2,400 Units, IntraCATHeter, PRN, Lauren Joyner MD    furosemide (LASIX) injection 40 mg, 40 mg, IntraVENous, TID, Lauren Joyner MD, 40 mg at 09/05/21 0835    insulin lispro (1 Unit Dial) 0-6 Units, 0-6 Units, SubCUTAneous, Q4H, Brittany Dejesus DO, 1 Units at 09/04/21 1949    glucose (GLUTOSE) 40 % oral gel 15 g, 15 g, Oral, PRN, Brittany Dejesus, DO    dextrose 50 % IV solution, 12.5 g, IntraVENous, PRN, Brittany Djeesus, DO    glucagon (rDNA) injection 1 mg, 1 mg, IntraMUSCular, PRN, Reymundod IRA Dejesus, DO    dextrose 5 % solution, 100 mL/hr, IntraVENous, PRN, Reymundod IRA Archerzi, DO    pantoprazole (PROTONIX) injection 40 mg, 40 mg, IntraVENous, BID, Holden Pittman MD, 40 mg at 09/05/21 0835    fentaNYL (SUBLIMAZE) injection 50 mcg, 50 mcg, IntraVENous, Q1H PRN, Rubio Pride MD    sodium chloride flush 0.9 % injection 5-40 mL, 5-40 mL, IntraVENous, 2 times per day, Brittany Dewittjazi, DO, 10 mL at 09/05/21 0836    sodium chloride flush 0.9 % injection 5-40 mL, 5-40 mL, IntraVENous, PRN, Ahmad A Oleg, DO, 10 mL at 09/05/21 0843    0.9 % sodium chloride infusion, 25 mL, IntraVENous, PRN, Ahmad A Oleg, DO, Stopped at 08/29/21 2301    ondansetron (ZOFRAN-ODT) disintegrating tablet 4 mg, 4 mg, Oral, Q8H PRN **OR** ondansetron (ZOFRAN) injection 4 mg, 4 mg, IntraVENous, Q6H PRN, Brittany Dejesus DO    polyethylene glycol (GLYCOLAX) packet 17 g, 17 g, Oral, Daily PRN, Demetri Dejesus DO    acetaminophen (TYLENOL) tablet 650 mg, 650 mg, Oral, Q6H PRN **OR** acetaminophen (TYLENOL) suppository 650 mg, 650 mg, Rectal, Q6H PRN, Brittany Dejesus DO, 650 mg at 09/02/21 1648    hydrALAZINE (APRESOLINE) injection 10 mg, 10 mg, IntraVENous, Q4H PRN, Brittany Dejesus DO, 10 mg at 09/04/21 1941      REVIEW OF SYSTEMS:     Review of systems not obtained due to patient factors - intubation       PHYSICAL EXAM:  Recent vital signs and recent I/Os reviewed by me.      Wt Readings from Last 3 Encounters:   09/05/21 185 lb 13.6 oz (84.3 kg)   07/08/21 244 lb 11.2 oz (111 kg)   02/26/21 237 lb 3.2 oz (107.6 kg)     BP Readings from Last 3 Encounters:   09/05/21 (!) 163/69   07/08/21 (!) 160/100   02/26/21 133/86     Patient Vitals for the past 24 hrs:   BP Temp Temp src Pulse Resp SpO2 Weight   09/05/21 0800 (!) 163/69 98.3 °F (36.8 °C) Bladder 105 19 94 % --   09/05/21 0757 -- -- -- -- 19 95 % --   09/05/21 0741 -- -- -- 105 -- -- --   09/05/21 0630 (!) 157/65 -- -- 99 16 96 % --   09/05/21 0600 (!) 179/79 -- -- 112 22 94 % --   09/05/21 0500 (!) 143/93 -- -- 107 19 97 % --   09/05/21 0430 (!) 170/74 -- -- 107 18 95 % --   09/05/21 0400 (!) 163/71 98.5 °F (36.9 °C) Bladder 106 18 94 % 185 lb 13.6 oz (84.3 kg)   09/05/21 0300 (!) 160/69 -- -- 103 21 96 % --   09/05/21 0230 (!) 143/63 -- -- 98 20 94 % --   09/05/21 0200 (!) 163/65 -- -- 109 20 95 % --   09/05/21 0130 (!) 162/65 -- -- 109 19 95 % --   09/05/21 0100 (!) 169/64 -- -- 115 22 96 % --   09/05/21 0000 (!) 159/74 -- -- 108 15 97 % --   09/04/21 2330 (!) 175/67 -- -- 109 23 97 % --   09/04/21 2300 (!) 173/73 -- -- 108 18 95 % --   09/04/21 2230 (!) 169/69 -- -- 104 16 98 % --   09/04/21 2200 (!) 158/62 -- -- 101 18 96 % --   09/04/21 2130 (!) 179/74 -- -- 116 16 96 % --   09/04/21 2100 (!) 177/75 -- -- 108 20 94 % --   09/04/21 2000 (!) 164/64 -- -- 107 23 96 % --   09/04/21 1930 (!) 184/75 98.8 °F (37.1 °C) Temporal 110 24 96 % --   09/04/21 1826 -- -- -- -- -- 96 % --   09/04/21 1600 (!) 183/86 99.8 °F (37.7 °C) Bladder 97 19 95 % --   09/04/21 1205 (!) 166/66 98.5 °F (36.9 °C) Bladder 87 15 98 % --   09/04/21 1200 (!) 166/66 97 °F (36.1 °C) -- 83 -- -- 192 lb 14.4 oz (87.5 kg)       Intake/Output Summary (Last 24 hours) at 9/5/2021 1117  Last data filed at 9/5/2021 3961  Gross per 24 hour   Intake 830 ml   Output 3935 ml   Net -3105 ml          Constitutional: Poorly responsive, Intubated and  obese habitus  Eyes: Conjunctiva clear and Noscleral icterus  Ear, Nose, and Throat: moist oral mucosa; ET to vent  Neck: Trachea midline,  No jugular venous distension  Cardiovascular: Regular rate and ryhthm, normal S1 and S2,    Respiratory: Mechanically ventilated; Lung sounds notable for synchronous vent-associated breath sounds  Abdomen:  distended. Normal bowel sounds. : Meza catheter is inserted, draining urine  Skin: no rash,  bruises on visible body area  Musculoskeletal: No clubbing or cyanosis of digits. and Normocephalic. Extremitties: +++ peripheral edema, no deformities. Neurologic:Unable to obtain as intubated/sedated. Psychiatric: Unable to obtain as intubated/sedated. DATA:  Diagnostic tests reviewed by me for today's visit:   (AS NEEDED FOR MY EVALUATION AND MANAGEMENT).        Recent Labs     09/03/21  0356 09/04/21  0330 09/05/21  0355   WBC 16.2* 22.9* 28.6*   HCT 24.6* 23.3* 23.9*    199 292     Iron Saturation:  No components found for: PERCENTFE  FERRITIN:    Lab Results   Component Value Date    FERRITIN 112.0 08/28/2021     IRON:    Lab Results   Component Value Date    IRON 22 08/28/2021     TIBC:    Lab Results   Component Value Date    TIBC 184 08/28/2021       Recent Labs     09/03/21  0356 09/04/21  0330 09/05/21  0355    136 139   K 4.0 4.3 3.8    104 106   CO2 17* 16* 16*   BUN 16 27* 29*   CREATININE 2.8* 3.7* 3.3*     Recent Labs     09/03/21  0356 09/04/21  0330 09/05/21  0355   CALCIUM 8.5 8.6 9.0   MG 2.00 2.20 2.20   PHOS 3.4 6.3* 4.4     No results for input(s): PH, PCO2, PO2 in the last 72 hours.     Invalid input(s): Dewitte Ek    ABG:  No results found for: PH, PCO2, PO2, HCO3, BE, THGB, TCO2, O2SAT  VBG:    Lab Results   Component Value Date    PHVEN 7.359 09/05/2021    HYY6XPQ 34.3 02/23/2021    BEVEN -4.6 02/23/2021    X5UUCTAX 100 02/23/2021       LDH:  No results found for: LDH  Uric Acid:  No results found for: LABURIC, URICACID    PT/INR:    Lab Results   Component Value Date    PROTIME 11.2 08/27/2021    INR 0.99 08/27/2021     Warfarin PT/INR:  No components found for: Jamesetta Dec  PTT:    Lab Results   Component Value Date    APTT 38.3 09/05/2021   [APTT}  Last 3 Troponin:    Lab Results   Component Value Date    TROPONINI 0.23 08/28/2021    TROPONINI 0.22 08/27/2021    TROPONINI 0.24 08/27/2021       U/A:    Lab Results   Component Value Date    COLORU YELLOW 08/27/2021    PROTEINU >300 08/27/2021    PHUR 6.0 08/28/2021    WBCUA 7 08/27/2021    RBCUA 3 08/27/2021    CLARITYU Clear 08/27/2021    SPECGRAV 1.013 08/27/2021    LEUKOCYTESUR Negative 08/27/2021    UROBILINOGEN 0.2 08/27/2021    BILIRUBINUR Negative 08/27/2021    BLOODU SMALL 08/27/2021    GLUCOSEU 250 08/27/2021     Microalbumen/Creatinine ratio:  No components found for: RUCREAT  24 Hour Urine for Protein:  No components found for: RAWUPRO, UHRS3, URWN80VL, UTV3  24 Hour Urine for Creatinine Clearance:  No components found for: CREAT4, UHRS10, UTV10  Urine Toxicology:  No components found for: Mary Hesselbach, IBENZO, ICOCAINE, IMARTHC, IOPIATES, IPHENCYC    HgBA1c:    Lab Results   Component Value Date    LABA1C 5.7 08/27/2021     RPR:  No results found for: RPR  HIV:  No results found for: HIV  NILSA:    Lab Results   Component Value Date NILSA Negative 04/25/2020     RF:  No results found for: RF  DSDNA:  No components found for: DNA  AMYLASE:  No results found for: AMYLASE  LIPASE:    Lab Results   Component Value Date    LIPASE 14.0 04/25/2020     Fibrinogen Level:  No components found for: FIB       BELOW MENTIONED RADIOLOGY STUDY RESULTS BY ME (AS NEEDED FOR MY EVALUATION AND MANAGEMENT). CT HEAD WO CONTRAST    Result Date: 8/27/2021  EXAMINATION: CT OF THE HEAD WITHOUT CONTRAST; CT OF THE CHEST WITHOUT CONTRAST  8/27/2021 7:12 pm TECHNIQUE: CT of the head was performed without the administration of intravenous contrast. Dose modulation, iterative reconstruction, and/or weight based adjustment of the mA/kV was utilized to reduce the radiation dose to as low as reasonably achievable.; CT of the chest was performed without the administration of intravenous contrast. Multiplanar reformatted images are provided for review. Dose modulation, iterative reconstruction, and/or weight based adjustment of the mA/kV was utilized to reduce the radiation dose to as low as reasonably achievable. COMPARISON: CT head 08/27/2020. HISTORY: ORDERING SYSTEM PROVIDED HISTORY: AMS TECHNOLOGIST PROVIDED HISTORY: Has a \"code stroke\" or \"stroke alert\" been called? ->No Reason for exam:->AMS Decision Support Exception - unselect if not a suspected or confirmed emergency medical condition->Emergency Medical Condition (MA) Is the patient pregnant?->No Reason for Exam: Respiratory Distress (Pt brought in per [facility] after call from  for resp distress. O2 sat 70's, BMV in route. Pt will open eyes to voice. Pupils 8mm and fixed. ) Acuity: Acute Type of Exam: Initial; ORDERING SYSTEM PROVIDED HISTORY: AMS, respiratory distress TECHNOLOGIST PROVIDED HISTORY: Reason for exam:->AMS, respiratory distress Is the patient pregnant?->No Reason for Exam: Respiratory Distress (Pt brought in per [facility] after call from  for resp distress.  O2 sat 70's, BMV in route. Pt will open eyes to voice. Pupils 8mm and fixed. ) Acuity: Acute Type of Exam: Initial CT HEAD FINDINGS: BRAIN/VENTRICLES: No acute hemorrhage. Periventricular and subcortical hypoattenuation is nonspecific. Interval chronic lacunar infarcts in the left corona radiata, thalamus, and internal capsule. Artifact partially obscures the laureano. Artifact partially obscures the inferior cerebellum. Vernard Mould white differentiation appears maintained given artifact near the skull base and through the posterior fossa. Mild prominence of the ventricles noted. Overall ventricle size appears increased from prior exam.  There is no midline shift. Basal cisterns appear patent. ORBITS: Visualized orbits appear unremarkable on this unenhanced exam. SINUSES: Mild mucosal thickening of the ethmoid and sphenoid sinuses. Visualized mastoid air cells appear clear. SOFT TISSUES/SKULL: No depressed calvarial fracture. Fluid in the nasopharynx and oropharynx. CT HEAD IMPRESSION: No acute hemorrhage or midline shift. Increased ventricle size compared to prior exam.  Correlate with presentation to exclude acute hydrocephalus. Other findings as described. CT CHEST FINDINGS: Mediastinum: Heart is borderline enlarged. Trace pericardial effusion or thickening. Aorta is within normal limits in size. Pulmonary arteries are within normal limits in size. . Lungs/pleura: Central airways are patent. Endotracheal tube with tip terminating in the distal 3rd subglottic trachea. Secretions noted in the trachea. Opacification of segmental and subsegmental bronchi. Ground-glass the confluent airspace disease. Interlobular septal thickening. Small to moderate pleural effusions. No pneumothorax. Soft Tissues/Bones: Suboptimal evaluation for adenopathy without intravenous contrast. Mediastinal adenopathy. Diffuse body wall edema. Scattered degenerative changes noted in the visualized spine without spondylolisthesis.  Upper Abdomen: Limited images of the upper abdomen are unremarkable given lack of intravenous contrast. CT CHEST IMPRESSION: 1.   1. Multifocal airspace disease that likely represents a combination of aspiration/pneumonia and pulmonary edema. 2. Other findings as described. CT CHEST WO CONTRAST    Result Date: 8/27/2021  EXAMINATION: CT OF THE HEAD WITHOUT CONTRAST; CT OF THE CHEST WITHOUT CONTRAST  8/27/2021 7:12 pm TECHNIQUE: CT of the head was performed without the administration of intravenous contrast. Dose modulation, iterative reconstruction, and/or weight based adjustment of the mA/kV was utilized to reduce the radiation dose to as low as reasonably achievable.; CT of the chest was performed without the administration of intravenous contrast. Multiplanar reformatted images are provided for review. Dose modulation, iterative reconstruction, and/or weight based adjustment of the mA/kV was utilized to reduce the radiation dose to as low as reasonably achievable. COMPARISON: CT head 08/27/2020. HISTORY: ORDERING SYSTEM PROVIDED HISTORY: AMS TECHNOLOGIST PROVIDED HISTORY: Has a \"code stroke\" or \"stroke alert\" been called? ->No Reason for exam:->AMS Decision Support Exception - unselect if not a suspected or confirmed emergency medical condition->Emergency Medical Condition (MA) Is the patient pregnant?->No Reason for Exam: Respiratory Distress (Pt brought in per [facility] after call from  for resp distress. O2 sat 70's, BMV in route. Pt will open eyes to voice. Pupils 8mm and fixed. ) Acuity: Acute Type of Exam: Initial; ORDERING SYSTEM PROVIDED HISTORY: AMS, respiratory distress TECHNOLOGIST PROVIDED HISTORY: Reason for exam:->AMS, respiratory distress Is the patient pregnant?->No Reason for Exam: Respiratory Distress (Pt brought in per [facility] after call from  for resp distress. O2 sat 70's, BMV in route. Pt will open eyes to voice.  Pupils 8mm and fixed. ) Acuity: Acute Type of Exam: Initial CT HEAD FINDINGS: BRAIN/VENTRICLES: No acute hemorrhage. Periventricular and subcortical hypoattenuation is nonspecific. Interval chronic lacunar infarcts in the left corona radiata, thalamus, and internal capsule. Artifact partially obscures the lauraeno. Artifact partially obscures the inferior cerebellum. [place] white differentiation appears maintained given artifact near the skull base and through the posterior fossa. Mild prominence of the ventricles noted. Overall ventricle size appears increased from prior exam.  There is no midline shift. Basal cisterns appear patent. ORBITS: Visualized orbits appear unremarkable on this unenhanced exam. SINUSES: Mild mucosal thickening of the ethmoid and sphenoid sinuses. Visualized mastoid air cells appear clear. SOFT TISSUES/SKULL: No depressed calvarial fracture. Fluid in the nasopharynx and oropharynx. CT HEAD IMPRESSION: No acute hemorrhage or midline shift. Increased ventricle size compared to prior exam.  Correlate with presentation to exclude acute hydrocephalus. Other findings as described. CT CHEST FINDINGS: Mediastinum: Heart is borderline enlarged. Trace pericardial effusion or thickening. Aorta is within normal limits in size. Pulmonary arteries are within normal limits in size. . Lungs/pleura: Central airways are patent. Endotracheal tube with tip terminating in the distal 3rd subglottic trachea. Secretions noted in the trachea. Opacification of segmental and subsegmental bronchi. Ground-glass the confluent airspace disease. Interlobular septal thickening. Small to moderate pleural effusions. No pneumothorax. Soft Tissues/Bones: Suboptimal evaluation for adenopathy without intravenous contrast. Mediastinal adenopathy. Diffuse body wall edema. Scattered degenerative changes noted in the visualized spine without spondylolisthesis. Upper Abdomen: Limited images of the upper abdomen are unremarkable given lack of intravenous contrast. CT CHEST IMPRESSION: 1. 1. Multifocal airspace disease that likely represents a combination of aspiration/pneumonia and pulmonary edema. 2. Other findings as described. XR CHEST PORTABLE    Result Date: 8/28/2021  EXAMINATION: ONE XRAY VIEW OF THE CHEST 8/28/2021 1:21 am COMPARISON: Chest x-ray dated 02/24/2021. Lc Velasquez HISTORY: ORDERING SYSTEM PROVIDED HISTORY: central line and OG placement TECHNOLOGIST PROVIDED HISTORY: Reason for exam:->central line and OG placement FINDINGS: LINES/TUBES/OTHER: Endotracheal tube is noted with its tip approximately 5 cm from the ana. Enteric tube is noted coursing below the level of the diaphragm and within the stomach. Left IJ central venous catheter is noted with its tip projecting over the area of the left brachiocephalic vein. HEART/MEDIASTINUM: The cardiac silhouette is mildly enlarged, but stable. PLEURA/LUNGS: There is perihilar fullness and increased interstitial markings which may reflect pulmonary vascular congestion in the correct clinical setting. There is left basilar reflecting airspace disease, atelectasis or pleural effusion. There is right basilar airspace disease. There is no appreciable pneumothorax. BONES/SOFT TISSUE: No acute abnormality. Endotracheal tube and enteric tube are in satisfactory position. The left IJ central venous catheter tip projects over the area of the left brachiocephalic vein. Right basilar airspace disease. Left basilar opacification reflecting airspace disease, atelectasis or pleural effusion.                 Conclusions      Summary   *Left ventricle - moderate concentric LVH, normal size and function with EF   of 55%   *Mitral valve - mild regurgitation   *Tricuspid valve - trivial regurgitation   *Bubble study - negative      Signature      ------------------------------------------------------------------   Electronically signed by Sloane Lopez MD (Interpreting   physician) on 08/31/2020 at 02:48 PM ------------------------------------------------------------------         Summary   Left ventricular systolic function is normal with ejection fraction   estimated at 55-60 %. No regional wall motion abnormalities are noted. There is mild left ventricular hypertrophy. Normal left ventricular diastolic filling pressure. Moderate mitral regurgitation. Mild pulmonic regurgitation present. IVC size is dilated (>2.1 cm) but collapses > 50% with respiration   consistent with elevated RA pressure (8 mmHg). **There is a small-moderate bilateral pleural effusion. **There is a small circumferential pericardial effusion noted.       Previous echo done 2020 - EF 55%      Signature      ------------------------------------------------------------------   Electronically signed by Yojana Brown MD   (Interpreting physician) on 08/30/2021 at 04:21 PM   ------------------------------------------------------------------

## 2021-09-05 NOTE — PROGRESS NOTES
Hospitalist Progress Note      PCP: Pooja Ledesma    Date of Admission: 8/27/2021    Chief Complaint: Respiratory distress    Hospital Course: 55 y.o. female who presented to [facility] with past medical history of hypertension, type 2 diabetes, CKD stage IV, HFpEF, hyperlipidemia presented to the ED with chief complaint of respiratory distress.     History cannot be obtained due to patient being intubated sedated. On chart review patient had 3 or 4 arrival sitting in bed and also having some new onset dyspnea that was severe sudden onset and then started progressively turning blue in the lips for her  and EMS was called noted to have saturation 60% on muscles brought here emergently.   Patient in the ED noted was unable to protect her airway thus was emergently intubated for lifesaving measures.       Subjective:     Patient now extubated, creatinine stable 3.3, urine output 535, she is negative cumulative balance 12 L, leukocytosis worsening in spite of stopping steroids, will obtain speech therapy and PT OT, had dialysis yesterday, proBNP still 18,000     Medications:  Reviewed    Infusion Medications    dextrose      sodium chloride Stopped (08/29/21 2301)     Scheduled Medications    cloNIDine  1 patch TransDERmal Weekly    heparin (porcine)  5,000 Units SubCUTAneous 3 times per day    epoetin yohana-epbx  10,000 Units SubCUTAneous Every Other Day    furosemide  40 mg IntraVENous TID    insulin lispro  0-6 Units SubCUTAneous Q4H    pantoprazole  40 mg IntraVENous BID    sodium chloride flush  5-40 mL IntraVENous 2 times per day     PRN Meds: LORazepam, labetalol, albumin human, heparin (porcine), glucose, dextrose, glucagon (rDNA), dextrose, fentanNYL, sodium chloride flush, sodium chloride, ondansetron **OR** ondansetron, polyethylene glycol, acetaminophen **OR** acetaminophen, hydrALAZINE      Intake/Output Summary (Last 24 hours) at 9/5/2021 9067  Last data filed at 9/5/2021 1147  Gross per 24 hour   Intake 30 ml   Output 795 ml   Net -765 ml       Physical Exam Performed:    BP (!) 163/77   Pulse 101   Temp 98.3 °F (36.8 °C) (Bladder)   Resp 19   Ht 5' 6\" (1.676 m)   Wt 185 lb 13.6 oz (84.3 kg)   SpO2 96%   BMI 30.00 kg/m²     General appearance: In no acute distress  HEENT: Pupils equal, round, and reactive to light. Conjunctivae/corneas clear. Neck: Supple, with full range of motion. No jugular venous distention. Trachea midline. Respiratory: She is vented and has some rales present at the bases bilaterally on her exam.  Eezes/Rhonchi. Cardiovascular: Regular rate and rhythm with normal S1/S2 without murmurs, rubs or gallops. Abdomen: Soft, non-tender, non-distended with normal bowel sounds. Musculoskeletal: No clubbing, cyanosis or edema bilaterally. Full range of motion without deformity. Skin: Skin color, texture, turgor normal.  No rashes or lesions. Neurologic: Alert and talking, seems very weak, but able to move extremities  Psychiatric: Not agitated  Capillary Refill: Brisk,3 seconds, normal   Peripheral Pulses: +2 palpable, equal bilaterally       Labs:   Recent Labs     09/03/21  0356 09/04/21 0330 09/05/21  0355   WBC 16.2* 22.9* 28.6*   HGB 7.9* 7.3* 7.5*   HCT 24.6* 23.3* 23.9*    199 292     Recent Labs     09/03/21  0356 09/04/21  0330 09/05/21  0355    136 139   K 4.0 4.3 3.8    104 106   CO2 17* 16* 16*   BUN 16 27* 29*   CREATININE 2.8* 3.7* 3.3*   CALCIUM 8.5 8.6 9.0   PHOS 3.4 6.3* 4.4     Recent Labs     09/03/21  0356 09/04/21  0330 09/05/21  0355   AST 13* 9* 12*   ALT 8* 6* 8*   BILITOT <0.2 <0.2 <0.2   ALKPHOS 127 119 119     No results for input(s): INR in the last 72 hours.   Recent Labs     09/03/21  0356 09/04/21  0330 09/05/21  0355   CKTOTAL 56 40 34       Urinalysis:      Lab Results   Component Value Date    NITRU Negative 08/27/2021    WBCUA 7 08/27/2021    RBCUA 3 08/27/2021    BLOODU SMALL 08/27/2021    SPECGRAV 1.013 08/27/2021    GLUCOSEU 250 08/27/2021       Radiology:  XR CHEST PORTABLE   Final Result   Persistent pulmonary edema and left basilar atelectasis or airspace disease. Persistent small left pleural effusion. Indeterminate positioning of left internal jugular catheter, though similar   to prior. XR CHEST PORTABLE   Final Result   Stable support apparatus. Persistent findings suggestive of pulmonary edema with small bilateral   pleural effusions, left greater than right. There is more focal consolidation in the left lung base, atelectasis versus   pneumonia. XR CHEST PORTABLE   Final Result   There has been placement of a right internal jugular temporary dialysis   catheter with tip over the upper right atrium. Remainder of support   apparatus is stable in appearance. Persistent findings of pulmonary edema with probable small bilateral pleural   effusions. IR FLUORO GUIDED CVA DEVICE PLMT/REPLACE/REMOVAL   Final Result   Successful ultrasound and fluoroscopy guided placement of a temporary   dialysis catheter. XR CHEST PORTABLE   Final Result   Extensive bilateral pulmonary disease and pleural effusion, no significant   change over the past 48 hours. XR CHEST PORTABLE   Final Result   Enlargement of the cardiac silhouette with central vascular congestion as   well as bibasilar infiltrates and pleural effusions, which may represent a   combination of pulmonary edema as well as potential pneumonia. XR CHEST PORTABLE   Final Result   Endotracheal tube and enteric tube are in satisfactory position. The left IJ central venous catheter tip projects over the area of the left   brachiocephalic vein. Right basilar airspace disease. Left basilar opacification reflecting airspace disease, atelectasis or   pleural effusion.          CT CHEST WO CONTRAST   Final Result      CT HEAD WO CONTRAST   Final Result      NM LUNG VENT/PERFUSION (VQ) (Results Pending)   VL Renal Arterial Duplex Complete    (Results Pending)           Assessment/Plan:    Active Hospital Problems    Diagnosis     Acute respiratory failure ([facility] [address] [J96.00]     Acute on chronic diastolic heart failure (HCC) [I50.33]     Chronic kidney disease (CKD), stage III (moderate) (HCC) [N18.30]     Chronic diastolic (congestive) heart failure (HCC) [I50.32]     Pulmonary edema [J81.1]        Acute hypoxic respiratory failure requiring mechanical ventilation  Continue to monitor  Covid testing negative  Completed course of cefepime, leukocytosis improving  CCM obtained procalcitonin 0.87, leukocytosis WBC 22>28, steroid has been stopped per pulmonary, she is afebrile.     Acute pulmonary edema  proBNP trending down, patient was on Lasix per pulmonary  Creatinine improving on Lasix 40 mg twice daily, nephrology following. Had HD yesterday    Acute on chronic HFpEF exacerbation  Has been on Lasix 3 times daily  Nephro is following    Diastolic dysfunction  Patient admitted with hypoxic respiratory failure  Cardiology following recommending C when medically stable probably next week     Pneumonia  Earlier this admission suspected on CT imaging with leukocytosis  Completed 5-day course of cefepime    Sepsis  Leukocytosis, worsening she is, left IJ which can be removed, Vas-Cath on the right IJ with no signs of infection. Obtain blood culture  Procalcitonin 0.87, sputum culture, currently off antibiotic  Had completed a course of cefepime. Blood culture grew staph epidermidis in 1 culture.  Probable contamination     Hypertensive urgency, improving  On lasix, monitor BP     Acute on chronic renal failure  Being followed by nephrology  Had started HD on 8/31 with UF 1.8 L    Getting HD  Negative balance of 9 L    Normocytic anemia  Iron panel ordered, Hemoccult     Chronic medical conditions  Type 2 Diabetes: Insulin sliding scale  Hyperlipidemia: Continue home medication  Class II obesity:Complicating assessment and treatment. Placing patient at risk for multiple co-morbidities as well as early death and contributing to the patient's presentation.  Education, and counseling     DVT Prophylaxis: heparin SQ  Diet: Diet NPO  ADULT TUBE FEEDING; Orogastric; Renal Formula; Continuous; 15; No; 30; Q 4 hours  Code Status: Full Code    PT/OT Eval Status: Obtain PT/OT for evaluation today , also need speech evaluation          Zachary Burris MD

## 2021-09-06 LAB
A/G RATIO: 0.8 (ref 1.1–2.2)
ABO/RH: NORMAL
ALBUMIN SERPL-MCNC: 2.6 G/DL (ref 3.4–5)
ALP BLD-CCNC: 113 U/L (ref 40–129)
ALT SERPL-CCNC: 8 U/L (ref 10–40)
ANION GAP SERPL CALCULATED.3IONS-SCNC: 21 MMOL/L (ref 3–16)
ANISOCYTOSIS: ABNORMAL
ANTIBODY SCREEN: NORMAL
APTT: 34.7 SEC (ref 26.2–38.6)
AST SERPL-CCNC: 12 U/L (ref 15–37)
BANDED NEUTROPHILS RELATIVE PERCENT: 1 % (ref 0–7)
BASOPHILS ABSOLUTE: 0 K/UL (ref 0–0.2)
BASOPHILS RELATIVE PERCENT: 0 %
BILIRUB SERPL-MCNC: <0.2 MG/DL (ref 0–1)
BLOOD BANK DISPENSE STATUS: NORMAL
BLOOD BANK DISPENSE STATUS: NORMAL
BLOOD BANK PRODUCT CODE: NORMAL
BLOOD BANK PRODUCT CODE: NORMAL
BPU ID: NORMAL
BPU ID: NORMAL
BUN BLDV-MCNC: 40 MG/DL (ref 7–20)
CALCIUM IONIZED: 1.2 MMOL/L (ref 1.12–1.32)
CALCIUM SERPL-MCNC: 8.7 MG/DL (ref 8.3–10.6)
CHLORIDE BLD-SCNC: 105 MMOL/L (ref 99–110)
CO2: 14 MMOL/L (ref 21–32)
CREAT SERPL-MCNC: 4.3 MG/DL (ref 0.6–1.1)
DESCRIPTION BLOOD BANK: NORMAL
DESCRIPTION BLOOD BANK: NORMAL
EOSINOPHILS ABSOLUTE: 0.8 K/UL (ref 0–0.6)
EOSINOPHILS RELATIVE PERCENT: 4 %
GFR AFRICAN AMERICAN: 13
GFR NON-AFRICAN AMERICAN: 11
GLOBULIN: 3.2 G/DL
GLUCOSE BLD-MCNC: 124 MG/DL (ref 70–99)
GLUCOSE BLD-MCNC: 127 MG/DL (ref 70–99)
GLUCOSE BLD-MCNC: 164 MG/DL (ref 70–99)
GLUCOSE BLD-MCNC: 99 MG/DL (ref 70–99)
HCT VFR BLD CALC: 22.3 % (ref 36–48)
HEMOGLOBIN: 7 G/DL (ref 12–16)
LACTIC ACID: 0.6 MMOL/L (ref 0.4–2)
LYMPHOCYTES ABSOLUTE: 3.3 K/UL (ref 1–5.1)
LYMPHOCYTES RELATIVE PERCENT: 17 %
MACROCYTES: ABNORMAL
MAGNESIUM: 2.3 MG/DL (ref 1.8–2.4)
MCH RBC QN AUTO: 28.3 PG (ref 26–34)
MCHC RBC AUTO-ENTMCNC: 31.6 G/DL (ref 31–36)
MCV RBC AUTO: 89.7 FL (ref 80–100)
METAMYELOCYTES RELATIVE PERCENT: 1 %
MONOCYTES ABSOLUTE: 1.2 K/UL (ref 0–1.3)
MONOCYTES RELATIVE PERCENT: 6 %
NEUTROPHILS ABSOLUTE: 14.3 K/UL (ref 1.7–7.7)
NEUTROPHILS RELATIVE PERCENT: 71 %
NUCLEATED RED BLOOD CELLS: 3 /100 WBC
OVALOCYTES: ABNORMAL
PDW BLD-RTO: 16.2 % (ref 12.4–15.4)
PERFORMED ON: ABNORMAL
PERFORMED ON: ABNORMAL
PERFORMED ON: NORMAL
PH VENOUS: 7.34 (ref 7.35–7.45)
PHOSPHORUS: 5 MG/DL (ref 2.5–4.9)
PLATELET # BLD: 309 K/UL (ref 135–450)
PLATELET SLIDE REVIEW: ADEQUATE
PMV BLD AUTO: 8 FL (ref 5–10.5)
POLYCHROMASIA: ABNORMAL
POTASSIUM SERPL-SCNC: 3.8 MMOL/L (ref 3.5–5.1)
RBC # BLD: 2.48 M/UL (ref 4–5.2)
SLIDE REVIEW: ABNORMAL
SODIUM BLD-SCNC: 140 MMOL/L (ref 136–145)
TOTAL CK: 46 U/L (ref 26–192)
TOTAL PROTEIN: 5.8 G/DL (ref 6.4–8.2)
WBC # BLD: 19.6 K/UL (ref 4–11)

## 2021-09-06 PROCEDURE — 80053 COMPREHEN METABOLIC PANEL: CPT

## 2021-09-06 PROCEDURE — 2580000003 HC RX 258

## 2021-09-06 PROCEDURE — 36430 TRANSFUSION BLD/BLD COMPNT: CPT

## 2021-09-06 PROCEDURE — 82330 ASSAY OF CALCIUM: CPT

## 2021-09-06 PROCEDURE — 86900 BLOOD TYPING SEROLOGIC ABO: CPT

## 2021-09-06 PROCEDURE — 86901 BLOOD TYPING SEROLOGIC RH(D): CPT

## 2021-09-06 PROCEDURE — 2580000003 HC RX 258: Performed by: INTERNAL MEDICINE

## 2021-09-06 PROCEDURE — 92526 ORAL FUNCTION THERAPY: CPT

## 2021-09-06 PROCEDURE — 94761 N-INVAS EAR/PLS OXIMETRY MLT: CPT

## 2021-09-06 PROCEDURE — 84100 ASSAY OF PHOSPHORUS: CPT

## 2021-09-06 PROCEDURE — 2500000003 HC RX 250 WO HCPCS: Performed by: INTERNAL MEDICINE

## 2021-09-06 PROCEDURE — 86923 COMPATIBILITY TEST ELECTRIC: CPT

## 2021-09-06 PROCEDURE — 6360000002 HC RX W HCPCS: Performed by: INTERNAL MEDICINE

## 2021-09-06 PROCEDURE — 85730 THROMBOPLASTIN TIME PARTIAL: CPT

## 2021-09-06 PROCEDURE — 83735 ASSAY OF MAGNESIUM: CPT

## 2021-09-06 PROCEDURE — 82550 ASSAY OF CK (CPK): CPT

## 2021-09-06 PROCEDURE — P9016 RBC LEUKOCYTES REDUCED: HCPCS

## 2021-09-06 PROCEDURE — 86850 RBC ANTIBODY SCREEN: CPT

## 2021-09-06 PROCEDURE — 83605 ASSAY OF LACTIC ACID: CPT

## 2021-09-06 PROCEDURE — 2000000000 HC ICU R&B

## 2021-09-06 PROCEDURE — 99232 SBSQ HOSP IP/OBS MODERATE 35: CPT | Performed by: INTERNAL MEDICINE

## 2021-09-06 PROCEDURE — 85025 COMPLETE CBC W/AUTO DIFF WBC: CPT

## 2021-09-06 PROCEDURE — C9113 INJ PANTOPRAZOLE SODIUM, VIA: HCPCS | Performed by: INTERNAL MEDICINE

## 2021-09-06 PROCEDURE — 90935 HEMODIALYSIS ONE EVALUATION: CPT

## 2021-09-06 RX ORDER — SODIUM CHLORIDE 0.9 % (FLUSH) 0.9 %
5-40 SYRINGE (ML) INJECTION PRN
Status: DISCONTINUED | OUTPATIENT
Start: 2021-09-06 | End: 2021-09-13 | Stop reason: HOSPADM

## 2021-09-06 RX ORDER — SODIUM CHLORIDE 9 MG/ML
INJECTION, SOLUTION INTRAVENOUS
Status: COMPLETED
Start: 2021-09-06 | End: 2021-09-06

## 2021-09-06 RX ORDER — LABETALOL HYDROCHLORIDE 5 MG/ML
20 INJECTION, SOLUTION INTRAVENOUS EVERY 6 HOURS
Status: DISCONTINUED | OUTPATIENT
Start: 2021-09-06 | End: 2021-09-08

## 2021-09-06 RX ORDER — HYDRALAZINE HYDROCHLORIDE 20 MG/ML
20 INJECTION INTRAMUSCULAR; INTRAVENOUS EVERY 4 HOURS PRN
Status: DISCONTINUED | OUTPATIENT
Start: 2021-09-06 | End: 2021-09-13 | Stop reason: HOSPADM

## 2021-09-06 RX ORDER — SODIUM CHLORIDE 0.9 % (FLUSH) 0.9 %
5-40 SYRINGE (ML) INJECTION EVERY 12 HOURS
Status: DISCONTINUED | OUTPATIENT
Start: 2021-09-06 | End: 2021-09-13 | Stop reason: HOSPADM

## 2021-09-06 RX ORDER — SODIUM CHLORIDE 9 MG/ML
INJECTION, SOLUTION INTRAVENOUS PRN
Status: DISCONTINUED | OUTPATIENT
Start: 2021-09-06 | End: 2021-09-13 | Stop reason: HOSPADM

## 2021-09-06 RX ADMIN — FUROSEMIDE 40 MG: 10 INJECTION, SOLUTION INTRAMUSCULAR; INTRAVENOUS at 20:53

## 2021-09-06 RX ADMIN — LABETALOL HYDROCHLORIDE 20 MG: 5 INJECTION INTRAVENOUS at 20:53

## 2021-09-06 RX ADMIN — PANTOPRAZOLE SODIUM 40 MG: 40 INJECTION, POWDER, FOR SOLUTION INTRAVENOUS at 20:53

## 2021-09-06 RX ADMIN — HEPARIN SODIUM 5000 UNITS: 5000 INJECTION INTRAVENOUS; SUBCUTANEOUS at 06:04

## 2021-09-06 RX ADMIN — Medication 10 ML: at 15:16

## 2021-09-06 RX ADMIN — HYDRALAZINE HYDROCHLORIDE 20 MG: 20 INJECTION INTRAMUSCULAR; INTRAVENOUS at 16:54

## 2021-09-06 RX ADMIN — INSULIN LISPRO 1 UNITS: 100 INJECTION, SOLUTION INTRAVENOUS; SUBCUTANEOUS at 20:53

## 2021-09-06 RX ADMIN — HEPARIN SODIUM 5000 UNITS: 5000 INJECTION INTRAVENOUS; SUBCUTANEOUS at 14:21

## 2021-09-06 RX ADMIN — INSULIN LISPRO 1 UNITS: 100 INJECTION, SOLUTION INTRAVENOUS; SUBCUTANEOUS at 23:59

## 2021-09-06 RX ADMIN — LABETALOL HYDROCHLORIDE 20 MG: 5 INJECTION INTRAVENOUS at 14:21

## 2021-09-06 RX ADMIN — PANTOPRAZOLE SODIUM 40 MG: 40 INJECTION, POWDER, FOR SOLUTION INTRAVENOUS at 15:15

## 2021-09-06 RX ADMIN — Medication 10 ML: at 21:13

## 2021-09-06 RX ADMIN — FUROSEMIDE 40 MG: 10 INJECTION, SOLUTION INTRAMUSCULAR; INTRAVENOUS at 14:21

## 2021-09-06 RX ADMIN — SODIUM CHLORIDE 500 ML: 9 INJECTION, SOLUTION INTRAVENOUS at 14:29

## 2021-09-06 RX ADMIN — HEPARIN SODIUM 5000 UNITS: 5000 INJECTION INTRAVENOUS; SUBCUTANEOUS at 20:53

## 2021-09-06 RX ADMIN — LABETALOL HYDROCHLORIDE 10 MG: 5 INJECTION INTRAVENOUS at 02:35

## 2021-09-06 RX ADMIN — ERYTHROPOIETIN 10000 UNITS: 20000 INJECTION, SOLUTION INTRAVENOUS; SUBCUTANEOUS at 13:19

## 2021-09-06 ASSESSMENT — PAIN SCALES - GENERAL
PAINLEVEL_OUTOF10: 0

## 2021-09-06 NOTE — PROGRESS NOTES
Hospitalist Progress Note      PCP: Peter Lynn    Date of Admission: 8/27/2021    Chief Complaint: Respiratory distress    Hospital Course: 55 y.o. female who presented to [facility] with past medical history of hypertension, type 2 diabetes, CKD stage IV, HFpEF, hyperlipidemia presented to the ED with chief complaint of respiratory distress.     History cannot be obtained due to patient being intubated sedated. On chart review patient had 3 or 4 arrival sitting in bed and also having some new onset dyspnea that was severe sudden onset and then started progressively turning blue in the lips for her  and EMS was called noted to have saturation 60% on muscles brought here emergently.   Patient in the ED noted was unable to protect her airway thus was emergently intubated for lifesaving measures.       Subjective:   Extubated, hypertensive, leukocytosis improving, creatinine 4.3, blood culture so far negative, HD today, she failed speech eval, we are getting physical therapy to evaluate, labetalol as needed for hypertension, now scheduled per cardiology      Medications:  Reviewed    Infusion Medications    sodium chloride      dextrose      sodium chloride Stopped (08/29/21 2301)     Scheduled Medications    labetalol  20 mg IntraVENous Q6H    sodium chloride flush  5-40 mL IntraVENous Q12H    cloNIDine  1 patch TransDERmal Weekly    heparin (porcine)  5,000 Units SubCUTAneous 3 times per day    epoetin yohana-epbx  10,000 Units SubCUTAneous Every Other Day    furosemide  40 mg IntraVENous TID    insulin lispro  0-6 Units SubCUTAneous Q4H    pantoprazole  40 mg IntraVENous BID    sodium chloride flush  5-40 mL IntraVENous 2 times per day     PRN Meds: hydrALAZINE, sodium chloride, sodium chloride flush, fluticasone, LORazepam, albumin human, heparin (porcine), glucose, dextrose, glucagon (rDNA), dextrose, fentanNYL, sodium chloride flush, sodium chloride, ondansetron **OR** ondansetron, polyethylene glycol, acetaminophen **OR** acetaminophen      Intake/Output Summary (Last 24 hours) at 9/6/2021 1147  Last data filed at 9/6/2021 0600  Gross per 24 hour   Intake 10 ml   Output 675 ml   Net -665 ml       Physical Exam Performed:    BP (!) 155/79   Pulse 116   Temp 98.1 °F (36.7 °C) (Temporal)   Resp 16   Ht 5' 6\" (1.676 m)   Wt 181 lb 14.1 oz (82.5 kg)   SpO2 99%   BMI 29.36 kg/m²     General appearance: In no acute distress  HEENT: Pupils equal, round, and reactive to light. Conjunctivae/corneas clear. Neck: Supple, with full range of motion. No jugular venous distention. Trachea midline. Respiratory: She is vented and has some rales present at the bases bilaterally on her exam.  Eezes/Rhonchi. Cardiovascular: Regular rate and rhythm with normal S1/S2 without murmurs, rubs or gallops. Abdomen: Soft, non-tender, non-distended with normal bowel sounds. Musculoskeletal: No clubbing, cyanosis or edema bilaterally. Full range of motion without deformity. Skin: Skin color, texture, turgor normal.  No rashes or lesions. Neurologic: Alert and talking, seems very weak, but able to move extremities  Psychiatric: Not agitated  Capillary Refill: Brisk,3 seconds, normal   Peripheral Pulses: +2 palpable, equal bilaterally       Labs:   Recent Labs     09/04/21 0330 09/05/21  0355 09/06/21  0403   WBC 22.9* 28.6* 19.6*   HGB 7.3* 7.5* 7.0*   HCT 23.3* 23.9* 22.3*    292 309     Recent Labs     09/04/21  0330 09/05/21  0355 09/06/21  0403    139 140   K 4.3 3.8 3.8    106 105   CO2 16* 16* 14*   BUN 27* 29* 40*   CREATININE 3.7* 3.3* 4.3*   CALCIUM 8.6 9.0 8.7   PHOS 6.3* 4.4 5.0*     Recent Labs     09/04/21  0330 09/05/21  0355 09/06/21  0403   AST 9* 12* 12*   ALT 6* 8* 8*   BILITOT <0.2 <0.2 <0.2   ALKPHOS 119 119 113     No results for input(s): INR in the last 72 hours.   Recent Labs     09/04/21  0330 09/05/21  0355 09/06/21  0403   CKTOTAL 40 34 46 Urinalysis:      Lab Results   Component Value Date    NITRU Negative 08/27/2021    WBCUA 7 08/27/2021    RBCUA 3 08/27/2021    BLOODU SMALL 08/27/2021    SPECGRAV 1.013 08/27/2021    GLUCOSEU 250 08/27/2021       Radiology:  XR CHEST PORTABLE   Final Result   Persistent pulmonary edema and left basilar atelectasis or airspace disease. Persistent small left pleural effusion. Indeterminate positioning of left internal jugular catheter, though similar   to prior. XR CHEST PORTABLE   Final Result   Stable support apparatus. Persistent findings suggestive of pulmonary edema with small bilateral   pleural effusions, left greater than right. There is more focal consolidation in the left lung base, atelectasis versus   pneumonia. XR CHEST PORTABLE   Final Result   There has been placement of a right internal jugular temporary dialysis   catheter with tip over the upper right atrium. Remainder of support   apparatus is stable in appearance. Persistent findings of pulmonary edema with probable small bilateral pleural   effusions. IR FLUORO GUIDED CVA DEVICE PLMT/REPLACE/REMOVAL   Final Result   Successful ultrasound and fluoroscopy guided placement of a temporary   dialysis catheter. XR CHEST PORTABLE   Final Result   Extensive bilateral pulmonary disease and pleural effusion, no significant   change over the past 48 hours. XR CHEST PORTABLE   Final Result   Enlargement of the cardiac silhouette with central vascular congestion as   well as bibasilar infiltrates and pleural effusions, which may represent a   combination of pulmonary edema as well as potential pneumonia. XR CHEST PORTABLE   Final Result   Endotracheal tube and enteric tube are in satisfactory position. The left IJ central venous catheter tip projects over the area of the left   brachiocephalic vein. Right basilar airspace disease.       Left basilar opacification reflecting airspace disease, atelectasis or   pleural effusion. CT CHEST WO CONTRAST   Final Result      CT HEAD WO CONTRAST   Final Result      NM LUNG VENT/PERFUSION (VQ)    (Results Pending)   VL Renal Arterial Duplex Complete    (Results Pending)           Assessment/Plan:    Active Hospital Problems    Diagnosis     Acute respiratory failure (HCC) [J96.00]     Acute on chronic diastolic heart failure (HCC) [I50.33]     Chronic kidney disease (CKD), stage III (moderate) (HCC) [N18.30]     Chronic diastolic (congestive) heart failure (HCC) [I50.32]     Pulmonary edema [J81.1]        Acute hypoxic respiratory failure requiring mechanical ventilation, status post liberation from mechanical ventilation  Continue to monitor  Covid testing negative  Completed course of cefepime, leukocytosis improving  CCM obtained procalcitonin 0.87, leukocytosis WBC 22>28>19, steroid has been stopped per pulmonary, she is afebrile. Blood cultures so far negative was taken yesterday.     Acute pulmonary edema  proBNP trending down, patient was on Lasix per pulmonary  Creatinine improving on Lasix 40 mg twice daily, nephrology following. She is getting HD today    Acute on chronic HFpEF exacerbation  Has been on Lasix 3 times daily  Nephro is following    Diastolic dysfunction  Patient admitted with hypoxic respiratory failure  Cardiology following recommending C when medically stable probably next week     Pneumonia  Earlier this admission suspected on CT imaging with leukocytosis  Completed 5-day course of cefepime    Sepsis  Leukocytosis, worsening she is, left IJ which can be removed, Vas-Cath on the right IJ with no signs of infection. Obtain blood culture  Procalcitonin 0.87, sputum culture, currently off antibiotic  Had completed a course of cefepime. Blood culture grew staph epidermidis in 1 culture.  Probable contamination     Hypertensive urgency, improving  On lasix, monitor BP     Acute on chronic renal failure  Being followed by nephrology  Had started HD on 8/31 with UF 1.8 L    Getting HD  Negative balance of 9 L    Normocytic anemia  Iron panel ordered, Hemoccult     Chronic medical conditions  Type 2 Diabetes: Insulin sliding scale  Hyperlipidemia: Continue home medication  Class II obesity:Complicating assessment and treatment. Placing patient at risk for multiple co-morbidities as well as early death and contributing to the patient's presentation. Education, and counseling     DVT Prophylaxis: heparin SQ  Diet: Diet NPO  ADULT TUBE FEEDING; Orogastric; Renal Formula; Continuous; 15; No; 30; Q 4 hours  Code Status: Full Code    PT/OT Eval Status and disposition. Obtain PT/OT for evaluation today , also need speech evaluation  She has been feeling with speech therapy, will obtain PT for evaluation, she seems very weak, extubated day #2, still hypertensive cardiology started her on labetalol, admitted for acute respiratory failure and pulmonary edema and KINZA on CKD, had been on hemodialysis, cardiology pulmonary and nephrology following probably will need ECF.        Tessie Krishna MD

## 2021-09-06 NOTE — PROGRESS NOTES
Speech Language Pathology  Dysphagia Treatment Note    Name:  Sabina Notice  :   1975  Medical Diagnosis:  Acute respiratory failure ([facility]) [J96.00]  Encephalopathy [G93.40]  Respiratory failure with hypoxia, unspecified chronicity ([facility] [address] [J96.91]  Pneumonia due to infectious organism, unspecified laterality, unspecified part of lung [J18.9]  Treatment Diagnosis: Oropharyngeal Dysphagia  Pain level: denies     Diet level prior to treatment: NPO   Tolerance of Current Diet Level: N/A     Assessment of Texture Tolerance:  -Impressions: Pt was positioned upright in chair, on RA. She was alert and oriented to person and type of place only. She demonstrated weak/dysphonic vocal quality with reduced articulatory precision and decreased speech intelligibility. Flat affect was assessed. Throughout session, pt appeared confused, making confabulated and incoherent comments. Recommend full speech language evaluation be completed. Sister was present and reported pt is not at her baseline. Oral mechanism examination; decreased ROM/coordination, suspected weakness. Trials of ice chips, thin liquids, Mildly Thick (Nectar) Liquids, puree and soft solids were provided to assess swallow function. With hand over hand assistance pt was able to self feed. Prolonged/reduced mastication with delayed oral clearing was assessed. Pharyngeal pooling was suspected with delayed swallow initiation and decreased laryngeal elevation. Pt utilized multiple delayed swallows to clear each bolus. Delayed coughing was assessed x2 with thin liquids, pt demonstrated all other trials with no overt clinical s/s of aspiration/penetration. Increased clearing swallows was noted with solids vs puree. Pt demonstrates increased risk for aspiration due to current cognitive state, recent intubation, suspected deconditioning and suspected dysphagia. Recommend close monitoring with intake.       Diet and Treatment Recommendations 2021:  Initiate Dysphagia I Pureed with thin liquids, meds with puree   1;1 assist with intake   If respiratory status declines or s/s of aspiration/penetration are assessed recommend downgrade to NPO pending re-assessment of swallow function     Compensatory strategies: Alternate solids/liquids , Upright as possible with all PO intake , Assist Feed , Small bites/sips , Swallow 2 times per bite , Eat/feed slowly, Remain upright 30-45 min     Dysphagia Goals:   1. Pt will tolerate ongoing assessment of swallow function with diet upgrade as clinically indicated. (ongoing 9/6/2021)   2. Pt will demonstrate understanding of aspiration risk and precautions via education/demonstration with occasional prompting (ongoing 9/6/2021)   3. If clinical s/s of aspiration/penetration continue to be noted, Pt will participate in Modified Barium Swallow Study (ongoing 9/6/2021)     Plan:  Continued daily Dysphagia treatment with goals per plan of care. Patient/Family Education:Education given to the Pt and nurse, who verbalized understanding    Discharge Recommendations:  Pt will benefit from continued skilled Speech Therapy for Dysphagia services, prior to returning home. Timed Code Treatment:  0 minutes     Total Treatment Time: 25 minutes     If patient discharges prior to next session this note will serve as a discharge summary.      Florentin Orr, [address]  Speech Language Pathologist

## 2021-09-06 NOTE — PROGRESS NOTES
MD Lincoln Watts MD Limmie Parsley, MD               Office: [phone]                      Fax: [phone]             [facility]                   NEPHROLOGY CVU INPATIENT PROGRESS NOTE:     PATIENT NAME: Ki Coreas  : 1975  MRN: [id number]     Nephrology treatment plan update. Patient extubated. Intermittent confusion. More awake and alert. Going to have hemodialysis treatment today  Right IJ. BUN is 40 and a creatinine of 4.3.    -Patient has advanced background of chronic kidney disease stage IV from diabetic nephropathy.  -Follows with Dr. Domenic Palomares.  -Likely progression of kidney disease. Dialysis with temporary right IJ.  -She may need permanent dialysis catheter placed. Medications reviewed. All nephrotoxic medications have been discontinued. Hold Ace inhibitors till renal recovery is complete. Ujdsyv-jisdzjcmn-sotzoy creatinine is 7.0.  -Patient will be transfused 2 units of blood transfusion during dialysis treatment    Poor nutritional status.  -Albumin is 2.5. Discussed with treatment team.  Discussed with RN. Patient critically ill   T.35 m        Patient seen separately while having hemodialysis treatment today. Discussed with dialysis nurse HAYLEE Rutledge at bedside.  -Using right IJ-temporary dialysis catheter. -Good flow.  -Fluid removal to dry weight. Less edema.  -Transfused 2 units of blood transfusion while on dialysis. Critically ill. Monitor closely.                        RECOMMENDATIONS:     - F/UP CXR today   - dialysis today- but UF only today-  - next HD - full on Saturday     - Needs a long term access, eventually prefered [facility] w/ h/o advanced CKD, likely will need long-term   -consult IR on Monday for [facility] placement     - continue Lasix too- Rx: IV lasix  40- TID - as making urine too   -Monitor acidosis,  -Hold home dose of aldactone, lisinopril,  -PNA Rx w/ iv abx    -BP is improving w/ POA    Pulmonary edema 8/28/2021 Yes    Chronic kidney disease (CKD), stage III (moderate) (HCC) 8/28/2021 Yes    Chronic diastolic (congestive) heart failure ([facility] [address] 8/28/2021 Yes    Acute on chronic diastolic heart failure ([facility] [address] 8/28/2021 Yes    Acute respiratory failure ([facility] [address] 8/27/2021 Yes        : other supportive care :   - Check daily renal function panel with electrolytes-phosphorus  - Strict monitoring of I/Os, daily weight  - Renal feeds/diet  - Current medications reviewed. - Nephrotoxic medications have been discontinued. - Dose adjusted and appropriate. - Dose meds for eGFR <15 mL/min/1.73m2 during KINZA    - Avoid heavy opioids due to renal failure - may use very low dose dilaudid / fentanyl with close monitoring of CNS and respiratory depression. Please refer to the orders. High Complexity. Multiple complex problems. Discussed with patient 's treatment team- ICU team, nurse     Thank you for allowing me to participate in this patient's care. Please do not hesitate to contact me anytime. We will follow along with you. Nazanin Carr MD,  [facility] [address] [place] [address] [place]: [phone] or [employer]  Fax: [phone]     Severally ill, at risk of impending organ failure needing ICU higher level of care/monitoring. Time spent  35 minutes that included face-to-face meeting/discussion with patient's treatment team (including primary/referring team and other consultants; included coordination of care with the treatment team; and review of patient's electronic medical records and ordering appropriates tests.         ========================================================   ========================================================   Subjective:   Patient currently still  ill, remains in ICU as remains intubated sedated,   HD tolerating ok so far,   UOP (+) noted 1.2L   Past medical, Surgical, Social, Family medical history reviewed by me.      MEDICATIONS: reviewed by me.   Medications Prior to Admission:  No current facility-administered medications on file prior to encounter. Current Outpatient Medications on File Prior to Encounter   Medication Sig Dispense Refill    Dulaglutide 0.75 MG/0.5ML SOPN Inject 0.75 mg into the skin once a week 4 pen 1    ibuprofen (ADVIL;MOTRIN) 200 MG CAPS Take 1 capsule by mouth      furosemide (LASIX) 80 MG tablet Take 80 mg by mouth every morning 80mg in the morning 40mg in the evening      amLODIPine (NORVASC) 10 MG tablet Take 1 tablet by mouth daily 30 tablet 1    furosemide (LASIX) 40 MG tablet Take 1 tablet by mouth every evening 30 tablet 1    spironolactone (ALDACTONE) 50 MG tablet Take 1 tablet by mouth daily 30 tablet 2    insulin glargine (LANTUS;BASAGLAR) 100 UNIT/ML injection pen Inject 30 Units into the skin daily 4 pen 2    lisinopril (PRINIVIL;ZESTRIL) 40 MG tablet Take 1 tablet by mouth daily 30 tablet 2    atorvastatin (LIPITOR) 40 MG tablet Take 1 tablet by mouth nightly 30 tablet 3    Insulin Pen Needle 29G X 12.7MM MISC 1 each by Does not apply route daily 100 each 5    propranolol (INDERAL LA) 80 MG extended release capsule Take 1 capsule by mouth every morning 30 capsule 2    LORazepam (ATIVAN) 0.5 MG tablet Take 0.5 mg by mouth 2 times daily as needed for Anxiety.  aspirin 81 MG EC tablet Take 1 tablet by mouth daily 30 tablet 3    pregabalin (LYRICA) 75 MG capsule Take 1 capsule by mouth nightly for 7 days.  7 capsule 0    sertraline (ZOLOFT) 50 MG tablet Take 1 tablet by mouth daily 30 tablet 3    glucose monitoring kit (FREESTYLE) monitoring kit 1 kit by Does not apply route daily 1 kit 0    insulin lispro, 1 Unit Dial, 100 UNIT/ML SOPN Inject 0-6 Units into the skin 3 times daily (with meals) **Corrective Low Dose Algorithm**  Glucose: Dose:               No Insulin  140-199 1 Unit  200-249 2 Units  250-299 3 Units  300-349 4 Units  350-399 5 Units  Over 399 6 Units (Patient taking differently: Inject 6-12 Units into the skin 3 times daily (with meals) **Corrective Low Dose Algorithm**  Glucose: Dose:               No Insulin  140-199 1 Unit  200-249 2 Units  250-299 3 Units  300-349 4 Units  350-399 5 Units  Over 399 6 Units) 3 pen 0    glucose blood VI test strips (VICTORY AGM-4000 TEST) strip Test four times daily until controlled and then three times daily.   Code 250.02  ONE TOUCH ULTRA MONITOR 100 strip 12         Current Facility-Administered Medications:     labetalol (NORMODYNE;TRANDATE) injection 20 mg, 20 mg, IntraVENous, Q6H, Trina Shin MD    hydrALAZINE (APRESOLINE) injection 20 mg, 20 mg, IntraVENous, Q4H PRN, Trina Shin MD    0.9 % sodium chloride infusion, , IntraVENous, PRN, Deejay Felix MD    sodium chloride flush 0.9 % injection 5-40 mL, 5-40 mL, IntraVENous, Q12H, Jack Nino MD    sodium chloride flush 0.9 % injection 5-40 mL, 5-40 mL, IntraVENous, PRN, Jack Nino MD    fluticasone (FLONASE) 50 MCG/ACT nasal spray 1 spray, 1 spray, Each Nostril, PRN, Melany Perry MD    LORazepam (ATIVAN) injection 0.5 mg, 0.5 mg, IntraVENous, Q4H PRN, Abbe Redman MD, 0.5 mg at 09/05/21 0607    cloNIDine (CATAPRES) 0.2 MG/24HR 1 patch, 1 patch, TransDERmal, Weekly, Trina Shin MD, 1 patch at 09/03/21 1145    heparin (porcine) injection 5,000 Units, 5,000 Units, SubCUTAneous, 3 times per day, Tamiko Santamaria MD, 5,000 Units at 09/06/21 0604    albumin human 25 % IV solution 25 g, 25 g, IntraVENous, PRN, Geo Brown MD, Stopped at 09/01/21 1439    epoetin yohana-epbx (RETACRIT) injection 10,000 Units, 10,000 Units, SubCUTAneous, Every Other Day, Geo Brown MD, 10,000 Units at 09/04/21 1103    heparin (porcine) injection 2,400 Units, 2,400 Units, IntraCATHeter, PRN, Geo Brown MD    furosemide (LASIX) injection 40 mg, 40 mg, IntraVENous, TID, Geo Brown MD, 40 mg at 09/05/21 2025    insulin lispro (1 Unit Dial) 0-6 Units, 0-6 Units, SubCUTAneous, Q4H, Ahmad A Oleg, DO, 1 Units at 09/04/21 1949    glucose (GLUTOSE) 40 % oral gel 15 g, 15 g, Oral, PRN, Ahmad A Oleg, DO    dextrose 50 % IV solution, 12.5 g, IntraVENous, PRN, Ahmad A Oleg, DO    glucagon (rDNA) injection 1 mg, 1 mg, IntraMUSCular, PRN, Ahmad A Oleg, DO    dextrose 5 % solution, 100 mL/hr, IntraVENous, PRN, Ahmad A Oleg, DO    pantoprazole (PROTONIX) injection 40 mg, 40 mg, IntraVENous, BID, Charline Pagan MD, 40 mg at 09/05/21 2027    fentaNYL (SUBLIMAZE) injection 50 mcg, 50 mcg, IntraVENous, Q1H PRN, Frances Farias MD    sodium chloride flush 0.9 % injection 5-40 mL, 5-40 mL, IntraVENous, 2 times per day, Ahmad A Oleg, DO, 10 mL at 09/05/21 2025    sodium chloride flush 0.9 % injection 5-40 mL, 5-40 mL, IntraVENous, PRN, Ahmad A Oleg, DO, 10 mL at 09/05/21 1657    0.9 % sodium chloride infusion, 25 mL, IntraVENous, PRN, Ahmad A Oleg, DO, Stopped at 08/29/21 2301    ondansetron (ZOFRAN-ODT) disintegrating tablet 4 mg, 4 mg, Oral, Q8H PRN **OR** ondansetron (ZOFRAN) injection 4 mg, 4 mg, IntraVENous, Q6H PRN, Ahmad A Oleg, DO    polyethylene glycol (GLYCOLAX) packet 17 g, 17 g, Oral, Daily PRN, Daphine Monk A Oleg, DO    acetaminophen (TYLENOL) tablet 650 mg, 650 mg, Oral, Q6H PRN **OR** acetaminophen (TYLENOL) suppository 650 mg, 650 mg, Rectal, Q6H PRN, Ahmad A Oleg, DO, 650 mg at 09/02/21 1648      REVIEW OF SYSTEMS:     Review of systems not obtained due to patient factors - intubation       PHYSICAL EXAM:  Recent vital signs and recent I/Os reviewed by me.      Wt Readings from Last 3 Encounters:   09/06/21 181 lb 14.1 oz (82.5 kg)   07/08/21 244 lb 11.2 oz (111 kg)   02/26/21 237 lb 3.2 oz (107.6 kg)     BP Readings from Last 3 Encounters:   09/06/21 (!) 152/57   07/08/21 (!) 160/100   02/26/21 133/86     Patient Vitals for the past 24 hrs:   BP Temp Temp src Pulse Resp SpO2 Weight   09/06/21 1000 (!) 152/57 -- -- 118 20 99 % --   09/06/21 0945 (!) 148/66 -- -- 117 23 97 % --   09/06/21 0930 (!) 176/76 -- -- 117 22 97 % --   09/06/21 0915 (!) 181/77 -- -- 113 20 96 % --   09/06/21 0900 (!) 177/66 98.1 °F (36.7 °C) Temporal 116 23 96 % --   09/06/21 0855 (!) 169/77 98.1 °F (36.7 °C) -- 115 20 96 % 181 lb 14.1 oz (82.5 kg)   09/06/21 0800 (!) 182/85 -- -- 114 21 96 % --   09/06/21 0700 (!) 181/89 -- -- 111 19 95 % --   09/06/21 0600 (!) 180/74 -- -- 109 22 96 % --   09/06/21 0400 (!) 182/79 97.9 °F (36.6 °C) Temporal 107 20 96 % 170 lb 6.7 oz (77.3 kg)   09/06/21 0009 -- -- -- -- 18 96 % --   09/06/21 0000 (!) 175/70 -- -- 105 18 95 % --   09/05/21 2330 (!) 176/76 98.2 °F (36.8 °C) Temporal 105 19 95 % --   09/05/21 2300 (!) 177/75 -- -- 106 19 97 % --   09/05/21 2100 (!) 180/72 -- -- 110 19 96 % --   09/05/21 2030 (!) 169/78 -- -- 110 21 97 % --   09/05/21 2020 -- -- -- -- 23 98 % --   09/05/21 2000 (!) 177/71 -- -- 105 20 96 % --   09/05/21 1930 (!) 185/82 97.5 °F (36.4 °C) Temporal 103 17 92 % --   09/05/21 1642 -- -- -- -- 18 95 % --   09/05/21 1600 (!) 170/70 98 °F (36.7 °C) Temporal 104 16 96 % --   09/05/21 1230 (!) 163/77 -- -- 101 19 96 % --   09/05/21 1200 (!) 161/67 98.2 °F (36.8 °C) Temporal 96 17 95 % --   09/05/21 1145 -- -- -- -- 16 96 % --   09/05/21 1130 (!) 177/74 -- -- 107 18 95 % --       Intake/Output Summary (Last 24 hours) at 9/6/2021 1124  Last data filed at 9/6/2021 0600  Gross per 24 hour   Intake 10 ml   Output 785 ml   Net -775 ml          Constitutional: Poorly responsive, Intubated and  obese habitus  Eyes: Conjunctiva clear and Noscleral icterus  Ear, Nose, and Throat: moist oral mucosa; ET to vent  Neck: Trachea midline,  No jugular venous distension  Cardiovascular: Regular rate and ryhthm, normal S1 and S2,    Respiratory: Mechanically ventilated; Lung sounds notable for synchronous vent-associated breath sounds  Abdomen:  distended. Normal bowel sounds.    : Meza catheter is inserted, draining urine  Skin: no rash,  bruises on visible body area  Musculoskeletal: No clubbing or cyanosis of digits. and Normocephalic. Extremitties: +++ peripheral edema, no deformities. Neurologic:Unable to obtain as intubated/sedated. Psychiatric: Unable to obtain as intubated/sedated. DATA:  Diagnostic tests reviewed by me for today's visit:   (AS NEEDED FOR MY EVALUATION AND MANAGEMENT). Recent Labs     09/04/21 0330 09/05/21 0355 09/06/21  0403   WBC 22.9* 28.6* 19.6*   HCT 23.3* 23.9* 22.3*    292 309     Iron Saturation:  No components found for: PERCENTFE  FERRITIN:    Lab Results   Component Value Date    FERRITIN 112.0 08/28/2021     IRON:    Lab Results   Component Value Date    IRON 22 08/28/2021     TIBC:    Lab Results   Component Value Date    TIBC 184 08/28/2021       Recent Labs     09/04/21 0330 09/05/21 0355 09/06/21  0403    139 140   K 4.3 3.8 3.8    106 105   CO2 16* 16* 14*   BUN 27* 29* 40*   CREATININE 3.7* 3.3* 4.3*     Recent Labs     09/04/21 0330 09/05/21 0355 09/06/21  0403   CALCIUM 8.6 9.0 8.7   MG 2.20 2.20 2.30   PHOS 6.3* 4.4 5.0*     No results for input(s): PH, PCO2, PO2 in the last 72 hours.     Invalid input(s): Benoit Millin    ABG:  No results found for: PH, PCO2, PO2, HCO3, BE, THGB, TCO2, O2SAT  VBG:    Lab Results   Component Value Date    PHVEN 7.339 09/06/2021    BOU2GHW 34.3 02/23/2021    BEVEN -4.6 02/23/2021    U5MHCQHN 100 02/23/2021       LDH:  No results found for: LDH  Uric Acid:  No results found for: LABURIC, URICACID    PT/INR:    Lab Results   Component Value Date    PROTIME 11.2 08/27/2021    INR 0.99 08/27/2021     Warfarin PT/INR:  No components found for: PTPATWAR, PTINRWAR  PTT:    Lab Results   Component Value Date    APTT 34.7 09/06/2021   [APTT}  Last 3 Troponin:    Lab Results   Component Value Date    TROPONINI 0.23 08/28/2021    TROPONINI 0.22 08/27/2021    TROPONINI 0.24 08/27/2021       U/A:    Lab Results   Component Value Date    COLORU YELLOW 08/27/2021    PROTEINU >300 08/27/2021    PHUR 6.0 08/28/2021    WBCUA 7 08/27/2021    RBCUA 3 08/27/2021    CLARITYU Clear 08/27/2021    SPECGRAV 1.013 08/27/2021    LEUKOCYTESUR Negative 08/27/2021    UROBILINOGEN 0.2 08/27/2021    BILIRUBINUR Negative 08/27/2021    BLOODU SMALL 08/27/2021    GLUCOSEU 250 08/27/2021     Microalbumen/Creatinine ratio:  No components found for: RUCREAT  24 Hour Urine for Protein:  No components found for: RAWUPRO, UHRS3, QYPA50RV, UTV3  24 Hour Urine for Creatinine Clearance:  No components found for: CREAT4, UHRS10, UTV10  Urine Toxicology:  No components found for: Maury Muro, IBENZO, ICOCAINE, IMARTHC, IOPIATES, IPHENCYC    HgBA1c:    Lab Results   Component Value Date    LABA1C 5.7 08/27/2021     RPR:  No results found for: RPR  HIV:  No results found for: HIV  NILSA:    Lab Results   Component Value Date    NILSA Negative 04/25/2020     RF:  No results found for: RF  DSDNA:  No components found for: DNA  AMYLASE:  No results found for: AMYLASE  LIPASE:    Lab Results   Component Value Date    LIPASE 14.0 04/25/2020     Fibrinogen Level:  No components found for: FIB       BELOW MENTIONED RADIOLOGY STUDY RESULTS BY ME (AS NEEDED FOR MY EVALUATION AND MANAGEMENT). CT HEAD WO CONTRAST    Result Date: 8/27/2021  EXAMINATION: CT OF THE HEAD WITHOUT CONTRAST; CT OF THE CHEST WITHOUT CONTRAST  8/27/2021 7:12 pm TECHNIQUE: CT of the head was performed without the administration of intravenous contrast. Dose modulation, iterative reconstruction, and/or weight based adjustment of the mA/kV was utilized to reduce the radiation dose to as low as reasonably achievable.; CT of the chest was performed without the administration of intravenous contrast. Multiplanar reformatted images are provided for review.  Dose modulation, iterative reconstruction, and/or weight based adjustment of the mA/kV was utilized to reduce the radiation dose to as low as reasonably achievable. COMPARISON: CT head 08/27/2020. HISTORY: ORDERING SYSTEM PROVIDED HISTORY: AMS TECHNOLOGIST PROVIDED HISTORY: Has a \"code stroke\" or \"stroke alert\" been called? ->No Reason for exam:->AMS Decision Support Exception - unselect if not a suspected or confirmed emergency medical condition->Emergency Medical Condition (MA) Is the patient pregnant?->No Reason for Exam: Respiratory Distress (Pt brought in [facility] after call from  for resp distress. O2 sat 70's, BMV in route. Pt will open eyes to voice. Pupils 8mm and fixed. ) Acuity: Acute Type of Exam: Initial; ORDERING SYSTEM PROVIDED HISTORY: AMS, respiratory distress TECHNOLOGIST PROVIDED HISTORY: Reason for exam:->AMS, respiratory distress Is the patient pregnant?->No Reason for Exam: Respiratory Distress (Pt brought in [facility] after call from  for resp distress. O2 sat 70's, BMV in route. Pt will open eyes to voice. Pupils 8mm and fixed. ) Acuity: Acute Type of Exam: Initial CT HEAD FINDINGS: BRAIN/VENTRICLES: No acute hemorrhage. Periventricular and subcortical hypoattenuation is nonspecific. Interval chronic lacunar infarcts in the left corona radiata, thalamus, and internal capsule. Artifact partially obscures the laureano. Artifact partially obscures the inferior cerebellum. Annabelle Lamberto white differentiation appears maintained given artifact near the skull base and through the posterior fossa. Mild prominence of the ventricles noted. Overall ventricle size appears increased from prior exam.  There is no midline shift. Basal cisterns appear patent. ORBITS: Visualized orbits appear unremarkable on this unenhanced exam. SINUSES: Mild mucosal thickening of the ethmoid and sphenoid sinuses. Visualized mastoid air cells appear clear. SOFT TISSUES/SKULL: No depressed calvarial fracture. Fluid in the nasopharynx and oropharynx. CT HEAD IMPRESSION: No acute hemorrhage or midline shift.  Increased ventricle size compared to prior exam.  Correlate with presentation to exclude acute hydrocephalus. Other findings as described. CT CHEST FINDINGS: Mediastinum: Heart is borderline enlarged. Trace pericardial effusion or thickening. Aorta is within normal limits in size. Pulmonary arteries are within normal limits in size. . Lungs/pleura: Central airways are patent. Endotracheal tube with tip terminating in the distal 3rd subglottic trachea. Secretions noted in the trachea. Opacification of segmental and subsegmental bronchi. Ground-glass the confluent airspace disease. Interlobular septal thickening. Small to moderate pleural effusions. No pneumothorax. Soft Tissues/Bones: Suboptimal evaluation for adenopathy without intravenous contrast. Mediastinal adenopathy. Diffuse body wall edema. Scattered degenerative changes noted in the visualized spine without spondylolisthesis. Upper Abdomen: Limited images of the upper abdomen are unremarkable given lack of intravenous contrast. CT CHEST IMPRESSION: 1.   1. Multifocal airspace disease that likely represents a combination of aspiration/pneumonia and pulmonary edema. 2. Other findings as described. CT CHEST WO CONTRAST    Result Date: 8/27/2021  EXAMINATION: CT OF THE HEAD WITHOUT CONTRAST; CT OF THE CHEST WITHOUT CONTRAST  8/27/2021 7:12 pm TECHNIQUE: CT of the head was performed without the administration of intravenous contrast. Dose modulation, iterative reconstruction, and/or weight based adjustment of the mA/kV was utilized to reduce the radiation dose to as low as reasonably achievable.; CT of the chest was performed without the administration of intravenous contrast. Multiplanar reformatted images are provided for review. Dose modulation, iterative reconstruction, and/or weight based adjustment of the mA/kV was utilized to reduce the radiation dose to as low as reasonably achievable. COMPARISON: CT head 08/27/2020.  HISTORY: ORDERING SYSTEM PROVIDED HISTORY: described. CT CHEST FINDINGS: Mediastinum: Heart is borderline enlarged. Trace pericardial effusion or thickening. Aorta is within normal limits in size. Pulmonary arteries are within normal limits in size. . Lungs/pleura: Central airways are patent. Endotracheal tube with tip terminating in the distal 3rd subglottic trachea. Secretions noted in the trachea. Opacification of segmental and subsegmental bronchi. Ground-glass the confluent airspace disease. Interlobular septal thickening. Small to moderate pleural effusions. No pneumothorax. Soft Tissues/Bones: Suboptimal evaluation for adenopathy without intravenous contrast. Mediastinal adenopathy. Diffuse body wall edema. Scattered degenerative changes noted in the visualized spine without spondylolisthesis. Upper Abdomen: Limited images of the upper abdomen are unremarkable given lack of intravenous contrast. CT CHEST IMPRESSION: 1.   1. Multifocal airspace disease that likely represents a combination of aspiration/pneumonia and pulmonary edema. 2. Other findings as described. XR CHEST PORTABLE    Result Date: 8/28/2021  EXAMINATION: ONE XRAY VIEW OF THE CHEST 8/28/2021 1:21 am COMPARISON: Chest x-ray dated 02/24/2021. Jose Brooks HISTORY: ORDERING SYSTEM PROVIDED HISTORY: central line and OG placement TECHNOLOGIST PROVIDED HISTORY: Reason for exam:->central line and OG placement FINDINGS: LINES/TUBES/OTHER: Endotracheal tube is noted with its tip approximately 5 cm from the ana. Enteric tube is noted coursing below the level of the diaphragm and within the stomach. Left IJ central venous catheter is noted with its tip projecting over the area of the left brachiocephalic vein. HEART/MEDIASTINUM: The cardiac silhouette is mildly enlarged, but stable. PLEURA/LUNGS: There is perihilar fullness and increased interstitial markings which may reflect pulmonary vascular congestion in the correct clinical setting.   There is left basilar reflecting airspace disease, atelectasis or pleural effusion. There is right basilar airspace disease. There is no appreciable pneumothorax. BONES/SOFT TISSUE: No acute abnormality. Endotracheal tube and enteric tube are in satisfactory position. The left IJ central venous catheter tip projects over the area of the left brachiocephalic vein. Right basilar airspace disease. Left basilar opacification reflecting airspace disease, atelectasis or pleural effusion. Conclusions      Summary   *Left ventricle - moderate concentric LVH, normal size and function with EF   of 55%   *Mitral valve - mild regurgitation   *Tricuspid valve - trivial regurgitation   *Bubble study - negative      Signature      ------------------------------------------------------------------   Electronically signed by Earlene Aguiar MD (Interpreting   physician) on 08/31/2020 at 02:48 PM   ------------------------------------------------------------------         Summary   Left ventricular systolic function is normal with ejection fraction   estimated at 55-60 %. No regional wall motion abnormalities are noted. There is mild left ventricular hypertrophy. Normal left ventricular diastolic filling pressure. Moderate mitral regurgitation. Mild pulmonic regurgitation present. IVC size is dilated (>2.1 cm) but collapses > 50% with respiration   consistent with elevated RA pressure (8 mmHg). **There is a small-moderate bilateral pleural effusion. **There is a small circumferential pericardial effusion noted.       Previous echo done 2020 - EF 55%      Signature      ------------------------------------------------------------------   Electronically signed by Abhilash Mulligan MD   (Interpreting physician) on 08/30/2021 at 04:21 PM   ------------------------------------------------------------------

## 2021-09-06 NOTE — PLAN OF CARE
Problem: Cardiovascular  Goal: Hemodynamic stability  Outcome: Met This Shift     Problem: Nutrition  Goal: Optimal nutrition therapy  9/6/2021 1931 by Mable Wilcox RN  Outcome: Not Met This Shift  9/6/2021 1419 by Faith Horowitz RD, LD  Outcome: Ongoing  9/6/2021 1419 by Faith Horowitz RD, LD  Outcome: Ongoing  9/6/2021 1419 by Faith Horowitz RD, LD  Outcome: Ongoing     Problem: Airway Clearance - Ineffective:  Goal: Ability to maintain a clear airway will improve  Outcome: Met This Shift     Problem: Anxiety/Stress:  Goal: Level of anxiety will decrease  Outcome: Met This Shift     Problem:  Bowel Function - Altered:  Goal: Bowel elimination is within specified parameters  Outcome: Not Met This Shift     Problem: Cardiac Output - Decreased:  Goal: Hemodynamic stability will improve  Outcome: Met This Shift     Problem: Fluid Volume - Imbalance:  Goal: Absence of imbalanced fluid volume signs and symptoms  Outcome: Met This Shift     Problem: Gas Exchange - Impaired:  Goal: Levels of oxygenation will improve  Outcome: Met This Shift     Problem: Mental Status - Impaired:  Goal: Mental status will be restored to baseline  Outcome: Ongoing     Problem: Nutrition Deficit:  Goal: Ability to achieve adequate nutritional intake will improve  Outcome: Ongoing     Problem: Pain:  Goal: Pain level will decrease  Outcome: Met This Shift  Goal: Recognizes and communicates pain  Outcome: Met This Shift  Goal: Control of acute pain  Outcome: Met This Shift     Problem: Serum Glucose Level - Abnormal:  Goal: Ability to maintain appropriate glucose levels will improve to within specified parameters  Outcome: Met This Shift     Problem: Skin Integrity - Impaired:  Goal: Will show no infection signs and symptoms  Outcome: Met This Shift     Problem: Sleep Pattern Disturbance:  Goal: Appears well-rested  Outcome: Met This Shift

## 2021-09-06 NOTE — PROGRESS NOTES
[facility]   Progress Note  Cardiology    Chief Complaint   Patient presents with    Respiratory Distress     Pt brought in per [facility] after call from  for resp distress. O2 sat 70's, BMV in route. Pt will open eyes to voice. Pupils 8mm and fixed. HPI:   No previous coronary evaluation in the setting of a pt with severe dm and blindness, recurrent infections in the past, CRF with nephrotic syndrome and hx of smoking. Trops on admit with elevated and flat ~0.22. Ekg is unremarkable. She has been extubated but remains severely volume overloaded. She failed her swallowing study and has been hypertensive through the night. I started clonidine patch a few days ago. I will start scheduled iv labetolol for her ongoing tachycardia and hypertension. She appears too anxious to maintain a NG tube for meds. Medications/Labs all Reviewed    Recent Labs     09/06/21  0403   WBC 19.6*   HGB 7.0*   HCT 22.3*   MCV 89.7        Recent Labs     09/06/21  0403   CREATININE 4.3*   BUN 40*      K 3.8      CO2 14*     No results for input(s): INR, PROTIME in the last 72 hours.      MEDICATIONS:    labetalol  20 mg IntraVENous Q6H    cloNIDine  1 patch TransDERmal Weekly    heparin (porcine)  5,000 Units SubCUTAneous 3 times per day    epoetin yohana-epbx  10,000 Units SubCUTAneous Every Other Day    furosemide  40 mg IntraVENous TID    insulin lispro  0-6 Units SubCUTAneous Q4H    pantoprazole  40 mg IntraVENous BID    sodium chloride flush  5-40 mL IntraVENous 2 times per day      dextrose      sodium chloride Stopped (08/29/21 2301)       Physical Examination:    BP (!) 182/85   Pulse 114   Temp 97.9 °F (36.6 °C) (Temporal)   Resp 21   Ht 5' 6\" (1.676 m)   Wt 170 lb 6.7 oz (77.3 kg)   SpO2 96%   BMI 27.51 kg/m²     Respiratory:  · Resp Assessment: normal  · Resp Auscultation: Clear to auscultation bilaterally   Cardiovascular:  · Auscultation: regular rate and rhythm, normal S1S2, no murmur, rub or gallop  · Palpation:  Nl PMI  · JVP:  normal  · Extremities: No Edema  Abdomen:  · Soft, non-tender  · Normal bowel sounds  Extremities:  ·  No Cyanosis or Clubbing  Neurological/Psychiatric:  · Oriented to time, place, and person  · Non-anxious  Skin Warm and dry      ECHO:  Left ventricular systolic function is normal with ejection fraction   estimated at 55-60 %. No regional wall motion abnormalities are noted. There is mild left ventricular hypertrophy. Normal left ventricular diastolic filling pressure. Moderate mitral regurgitation. Mild pulmonic regurgitation present. IVC size is dilated (>2.1 cm) but collapses > 50% with respiration   consistent with elevated RA pressure (8 mmHg). **There is a small-moderate bilateral pleural effusion. **There is a small circumferential pericardial effusion noted. Previous echo done 2020 - EF 55%  Assessment:    Active Problems:        Acute respiratory failure (HCC)  Plan: sudden hypoxic resp failure. No COVID. H/o CKD with KINZA. Severely elevated BNP but LV function essentially normal.  Mild diastolic dysfunction. Recommend LHC when medically stable and extubated on minimal respiratory support (probably next week). Now that she is on HD can dialyze contrast from cath. Elevated troponin could be from KINZA or respiratory failure. She needs to have more fluid removal prior to any angiography. Severe DM with complications which has been longstanding and for which she has been poorly compliant    Nephrotic syndrome - now on dialysis daily     Hypertension -She has been on a clonidine patch but failed the swallowing study so I will start scheduled IV labetalol with prn hydralazine.      Yefri Dickson MD, 9/6/2021 8:29 AM

## 2021-09-06 NOTE — FLOWSHEET NOTE
Treatment time: 275 minutes  Net UF: 2388ml    Pre weight: 82.5kg  Post weight: 81.0kg  EDW: N/A    Access used: right chest wall temp catheter  Access function: poor, clots noted    Medications or blood products given: blood administration incomplete due to system clotted. Regular outpatient schedule: [facility]    Summary of response to treatment: Pt started tx at [id number], dialysis running and patient vital signs stable. Pt has a hemoglobin of 7.0, 2 units of RBC ordered per Dr Edil Cruz. Type and screen drawn by dialysis RN. Unable to complete given blood due to system clotted, dialysis system changed to new tubing, still unable to give blood and meet goal due to clotted system. Reported to Bedside RN. Bedside RN will restart the blood administration. Tx finished at 1230. Delay is noted. 09/06/21 0855 09/06/21 1230   Treatment   Time On 0855  --    Time Off  --  1230   Vital Signs   BP (!) 169/77 (!) 168/90   Temp 98.1 °F (36.7 °C) 97.1 °F (36.2 °C)   Pulse 115 119   Resp 20 16   SpO2 96 % 98 %   Weight 181 lb 14.1 oz (82.5 kg) 178 lb 9.2 oz (81 kg)   Weight Method Actual;Bed scale Actual;Bed scale   Treatment Initiation   Dialyze Hours 3  --    Post-Hemodialysis Assessment   Rinseback Volume (ml)  --  300 ml   Total Liters Processed (l/min)  --  36.4 l/min   Duration of Treatment (minutes)  --  275 minutes   Heparin amount administered during treatment (units)  --  0 units   Hemodialysis Intake (ml)  --  1100 ml   Hemodialysis Output (ml)  --  3488 ml   NET Removed (ml)  --  2300 ml   Tolerated Treatment  --  Good       Copy of dialysis treatment record placed in chart, to be scanned into EMR.     Electronically signed by Ramin Burgess RN on 9/6/2021 at 1:00 PM

## 2021-09-06 NOTE — PLAN OF CARE
Nutrition Problem #1: Inadequate oral intake  Intervention: Food and/or Nutrient Delivery: Continue NPO  Nutritional Goals: initiation of nutrition support

## 2021-09-06 NOTE — PROGRESS NOTES
Comprehensive Nutrition Assessment    Type and Reason for Visit:  Reassess    Nutrition Recommendations/Plan:   PO diet per SLP vs nutrition support    Nutrition Assessment:  Pt nutritionally compromised AEB inability to reach full nutrition support goals over past 9 days. Pt extubated on 9/4, no po intake as of yet. SLP following for appropriate diet. Will provide ONS once diet advanced. Malnutrition Assessment:  Malnutrition Status: At risk for malnutrition (Comment)    Context:  Acute Illness         Estimated Daily Nutrient Needs:  Energy (kcal):  2025 - 2430; Weight Used for Energy Requirements:  Current (81 kg)     Protein (g):  97 - 162; Weight Used for Protein Requirements:  Current (1.2 - 2.0g/81kg)        Fluid (ml/day):  2000; Method Used for Fluid Requirements:  ml/Kg (25mL/kg)      Nutrition Related Findings:  HD continues; BUN 40, Creat 4.3; LBM 9/2; active BS; disoriented to situation and place      Wounds:  None       Current Nutrition Therapies:    ADULT DIET; Dysphagia - Pureed    Anthropometric Measures:  · Height: 5' 6\" (167.6 cm)  · Current Body Weight: 178 lb 9.2 oz (81 kg) (dry wt)   · Admission Body Weight: 230 lb (104.3 kg)    · Ideal Body Weight: 130 lbs; % Ideal Body Weight 137.4 %   · BMI: 28.8  · BMI Categories: Overweight (BMI 25.0-29. 9)       Nutrition Diagnosis:   · Inadequate oral intake related to inadequate protein-energy intake as evidenced by NPO or clear liquid status due to medical condition      Nutrition Interventions:   Food and/or Nutrient Delivery:  Continue NPO  Nutrition Education/Counseling:  Education not indicated   Coordination of Nutrition Care:  Continue to monitor while inpatient    Goals:  initiation of nutrition support       Nutrition Monitoring and Evaluation:   Behavioral-Environmental Outcomes:  None Identified   Food/Nutrient Intake Outcomes:  Diet Advancement/Tolerance  Physical Signs/Symptoms Outcomes:  Weight     Discharge Planning:     Too soon to determine     Electronically signed by Jillian Reece RD, LD on 9/6/21 at 2:20 PM EDT  Contact: [phone]

## 2021-09-06 NOTE — PROGRESS NOTES
Speech Language Pathology  Attempt   Melo Solis   1975     Attempted to see pt for dysphagia therapy follow-up. Pt evaluated 9/5/2021 with recommendations for NPO pending ongoing assessment of swallow function. Pt currently on HD at time of attempt. Will re-attempt to follow-up later this date.      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

## 2021-09-07 ENCOUNTER — APPOINTMENT (OUTPATIENT)
Dept: INTERVENTIONAL RADIOLOGY/VASCULAR | Age: 46
DRG: 720 | End: 2021-09-07
Payer: COMMERCIAL

## 2021-09-07 LAB
A/G RATIO: 0.9 (ref 1.1–2.2)
ALBUMIN SERPL-MCNC: 2.7 G/DL (ref 3.4–5)
ALP BLD-CCNC: 110 U/L (ref 40–129)
ALT SERPL-CCNC: 9 U/L (ref 10–40)
ANION GAP SERPL CALCULATED.3IONS-SCNC: 15 MMOL/L (ref 3–16)
ANISOCYTOSIS: ABNORMAL
APTT: 33.5 SEC (ref 26.2–38.6)
AST SERPL-CCNC: 14 U/L (ref 15–37)
BANDED NEUTROPHILS RELATIVE PERCENT: 2 % (ref 0–7)
BASOPHILS ABSOLUTE: 0.2 K/UL (ref 0–0.2)
BASOPHILS RELATIVE PERCENT: 1 %
BILIRUB SERPL-MCNC: 0.3 MG/DL (ref 0–1)
BUN BLDV-MCNC: 26 MG/DL (ref 7–20)
CALCIUM IONIZED: 1.14 MMOL/L (ref 1.12–1.32)
CALCIUM SERPL-MCNC: 8.5 MG/DL (ref 8.3–10.6)
CHLORIDE BLD-SCNC: 108 MMOL/L (ref 99–110)
CO2: 16 MMOL/L (ref 21–32)
CREAT SERPL-MCNC: 3.4 MG/DL (ref 0.6–1.1)
EOSINOPHILS ABSOLUTE: 0.2 K/UL (ref 0–0.6)
EOSINOPHILS RELATIVE PERCENT: 1 %
GFR AFRICAN AMERICAN: 18
GFR NON-AFRICAN AMERICAN: 15
GLOBULIN: 3 G/DL
GLUCOSE BLD-MCNC: 137 MG/DL (ref 70–99)
GLUCOSE BLD-MCNC: 140 MG/DL (ref 70–99)
GLUCOSE BLD-MCNC: 143 MG/DL (ref 70–99)
GLUCOSE BLD-MCNC: 144 MG/DL (ref 70–99)
GLUCOSE BLD-MCNC: 145 MG/DL (ref 70–99)
GLUCOSE BLD-MCNC: 148 MG/DL (ref 70–99)
GLUCOSE BLD-MCNC: 157 MG/DL (ref 70–99)
GLUCOSE BLD-MCNC: 198 MG/DL (ref 70–99)
HCT VFR BLD CALC: 27.6 % (ref 36–48)
HEMOGLOBIN: 8.9 G/DL (ref 12–16)
INR BLD: 1 (ref 0.88–1.12)
LACTIC ACID: 0.6 MMOL/L (ref 0.4–2)
LYMPHOCYTES ABSOLUTE: 2.5 K/UL (ref 1–5.1)
LYMPHOCYTES RELATIVE PERCENT: 16 %
MACROCYTES: ABNORMAL
MAGNESIUM: 2 MG/DL (ref 1.8–2.4)
MCH RBC QN AUTO: 28.1 PG (ref 26–34)
MCHC RBC AUTO-ENTMCNC: 32.3 G/DL (ref 31–36)
MCV RBC AUTO: 87.1 FL (ref 80–100)
MONOCYTES ABSOLUTE: 1.1 K/UL (ref 0–1.3)
MONOCYTES RELATIVE PERCENT: 7 %
NEUTROPHILS ABSOLUTE: 11.9 K/UL (ref 1.7–7.7)
NEUTROPHILS RELATIVE PERCENT: 73 %
NUCLEATED RED BLOOD CELLS: 2 /100 WBC
OVALOCYTES: ABNORMAL
PDW BLD-RTO: 16.5 % (ref 12.4–15.4)
PERFORMED ON: ABNORMAL
PH VENOUS: 7.35 (ref 7.35–7.45)
PHOSPHORUS: 3.2 MG/DL (ref 2.5–4.9)
PLATELET # BLD: 290 K/UL (ref 135–450)
PLATELET SLIDE REVIEW: ADEQUATE
PMV BLD AUTO: 8.2 FL (ref 5–10.5)
POLYCHROMASIA: ABNORMAL
POTASSIUM SERPL-SCNC: 3.9 MMOL/L (ref 3.5–5.1)
PROTHROMBIN TIME: 11.3 SEC (ref 9.9–12.7)
RBC # BLD: 3.17 M/UL (ref 4–5.2)
SLIDE REVIEW: ABNORMAL
SODIUM BLD-SCNC: 139 MMOL/L (ref 136–145)
TOTAL CK: 74 U/L (ref 26–192)
TOTAL PROTEIN: 5.7 G/DL (ref 6.4–8.2)
WBC # BLD: 15.8 K/UL (ref 4–11)

## 2021-09-07 PROCEDURE — 99233 SBSQ HOSP IP/OBS HIGH 50: CPT | Performed by: NURSE PRACTITIONER

## 2021-09-07 PROCEDURE — 6360000002 HC RX W HCPCS: Performed by: INTERNAL MEDICINE

## 2021-09-07 PROCEDURE — 0JH63XZ INSERTION OF TUNNELED VASCULAR ACCESS DEVICE INTO CHEST SUBCUTANEOUS TISSUE AND FASCIA, PERCUTANEOUS APPROACH: ICD-10-PCS | Performed by: INTERNAL MEDICINE

## 2021-09-07 PROCEDURE — 99152 MOD SED SAME PHYS/QHP 5/>YRS: CPT

## 2021-09-07 PROCEDURE — 2580000003 HC RX 258: Performed by: RADIOLOGY

## 2021-09-07 PROCEDURE — 2500000003 HC RX 250 WO HCPCS: Performed by: INTERNAL MEDICINE

## 2021-09-07 PROCEDURE — C1750 CATH, HEMODIALYSIS,LONG-TERM: HCPCS

## 2021-09-07 PROCEDURE — 6360000002 HC RX W HCPCS: Performed by: RADIOLOGY

## 2021-09-07 PROCEDURE — 82330 ASSAY OF CALCIUM: CPT

## 2021-09-07 PROCEDURE — 83735 ASSAY OF MAGNESIUM: CPT

## 2021-09-07 PROCEDURE — 80053 COMPREHEN METABOLIC PANEL: CPT

## 2021-09-07 PROCEDURE — 77001 FLUOROGUIDE FOR VEIN DEVICE: CPT

## 2021-09-07 PROCEDURE — 85730 THROMBOPLASTIN TIME PARTIAL: CPT

## 2021-09-07 PROCEDURE — 02H633Z INSERTION OF INFUSION DEVICE INTO RIGHT ATRIUM, PERCUTANEOUS APPROACH: ICD-10-PCS | Performed by: INTERNAL MEDICINE

## 2021-09-07 PROCEDURE — 82550 ASSAY OF CK (CPK): CPT

## 2021-09-07 PROCEDURE — 2580000003 HC RX 258: Performed by: INTERNAL MEDICINE

## 2021-09-07 PROCEDURE — 36558 INSERT TUNNELED CV CATH: CPT

## 2021-09-07 PROCEDURE — 2000000000 HC ICU R&B

## 2021-09-07 PROCEDURE — 83605 ASSAY OF LACTIC ACID: CPT

## 2021-09-07 PROCEDURE — C9113 INJ PANTOPRAZOLE SODIUM, VIA: HCPCS | Performed by: INTERNAL MEDICINE

## 2021-09-07 PROCEDURE — 85025 COMPLETE CBC W/AUTO DIFF WBC: CPT

## 2021-09-07 PROCEDURE — 85610 PROTHROMBIN TIME: CPT

## 2021-09-07 PROCEDURE — 6370000000 HC RX 637 (ALT 250 FOR IP): Performed by: INTERNAL MEDICINE

## 2021-09-07 PROCEDURE — 84100 ASSAY OF PHOSPHORUS: CPT

## 2021-09-07 RX ORDER — LIDOCAINE HYDROCHLORIDE AND EPINEPHRINE BITARTRATE 20; .01 MG/ML; MG/ML
20 INJECTION, SOLUTION SUBCUTANEOUS ONCE
Status: COMPLETED | OUTPATIENT
Start: 2021-09-07 | End: 2021-09-07

## 2021-09-07 RX ORDER — HEPARIN SODIUM 200 [USP'U]/100ML
7 INJECTION, SOLUTION INTRAVENOUS CONTINUOUS
Status: ACTIVE | OUTPATIENT
Start: 2021-09-07 | End: 2021-09-07

## 2021-09-07 RX ORDER — FENTANYL CITRATE 50 UG/ML
INJECTION, SOLUTION INTRAMUSCULAR; INTRAVENOUS
Status: COMPLETED | OUTPATIENT
Start: 2021-09-07 | End: 2021-09-07

## 2021-09-07 RX ORDER — HEPARIN SODIUM 1000 [USP'U]/ML
6000 INJECTION, SOLUTION INTRAVENOUS; SUBCUTANEOUS ONCE
Status: COMPLETED | OUTPATIENT
Start: 2021-09-07 | End: 2021-09-07

## 2021-09-07 RX ORDER — FUROSEMIDE 40 MG/1
80 TABLET ORAL DAILY
Status: DISCONTINUED | OUTPATIENT
Start: 2021-09-08 | End: 2021-09-08

## 2021-09-07 RX ORDER — MIDAZOLAM HYDROCHLORIDE 1 MG/ML
INJECTION INTRAMUSCULAR; INTRAVENOUS
Status: COMPLETED | OUTPATIENT
Start: 2021-09-07 | End: 2021-09-07

## 2021-09-07 RX ORDER — LIDOCAINE HYDROCHLORIDE 10 MG/ML
20 INJECTION, SOLUTION EPIDURAL; INFILTRATION; INTRACAUDAL; PERINEURAL ONCE
Status: COMPLETED | OUTPATIENT
Start: 2021-09-07 | End: 2021-09-07

## 2021-09-07 RX ADMIN — LABETALOL HYDROCHLORIDE 20 MG: 5 INJECTION INTRAVENOUS at 14:01

## 2021-09-07 RX ADMIN — FUROSEMIDE 40 MG: 10 INJECTION, SOLUTION INTRAMUSCULAR; INTRAVENOUS at 14:00

## 2021-09-07 RX ADMIN — MIDAZOLAM 0.5 MG: 1 INJECTION INTRAMUSCULAR; INTRAVENOUS at 14:31

## 2021-09-07 RX ADMIN — INSULIN LISPRO 1 UNITS: 100 INJECTION, SOLUTION INTRAVENOUS; SUBCUTANEOUS at 23:54

## 2021-09-07 RX ADMIN — FUROSEMIDE 40 MG: 10 INJECTION, SOLUTION INTRAMUSCULAR; INTRAVENOUS at 09:06

## 2021-09-07 RX ADMIN — Medication 10 ML: at 00:01

## 2021-09-07 RX ADMIN — HEPARIN SODIUM IN SODIUM CHLORIDE 7 UNITS/KG/HR: 200 INJECTION INTRAVENOUS at 14:40

## 2021-09-07 RX ADMIN — HEPARIN SODIUM 5000 UNITS: 5000 INJECTION INTRAVENOUS; SUBCUTANEOUS at 06:10

## 2021-09-07 RX ADMIN — HEPARIN SODIUM 3600 UNITS: 1000 INJECTION INTRAVENOUS; SUBCUTANEOUS at 14:45

## 2021-09-07 RX ADMIN — INSULIN LISPRO 1 UNITS: 100 INJECTION, SOLUTION INTRAVENOUS; SUBCUTANEOUS at 20:09

## 2021-09-07 RX ADMIN — HEPARIN SODIUM 5000 UNITS: 5000 INJECTION INTRAVENOUS; SUBCUTANEOUS at 23:54

## 2021-09-07 RX ADMIN — Medication 10 ML: at 20:07

## 2021-09-07 RX ADMIN — INSULIN LISPRO 1 UNITS: 100 INJECTION, SOLUTION INTRAVENOUS; SUBCUTANEOUS at 08:12

## 2021-09-07 RX ADMIN — LABETALOL HYDROCHLORIDE 20 MG: 5 INJECTION INTRAVENOUS at 03:42

## 2021-09-07 RX ADMIN — HYDRALAZINE HYDROCHLORIDE 20 MG: 20 INJECTION INTRAMUSCULAR; INTRAVENOUS at 14:58

## 2021-09-07 RX ADMIN — PANTOPRAZOLE SODIUM 40 MG: 40 INJECTION, POWDER, FOR SOLUTION INTRAVENOUS at 09:06

## 2021-09-07 RX ADMIN — LIDOCAINE HYDROCHLORIDE,EPINEPHRINE BITARTRATE 8 ML: 20; .01 INJECTION, SOLUTION INFILTRATION; PERINEURAL at 14:45

## 2021-09-07 RX ADMIN — Medication 10 ML: at 09:03

## 2021-09-07 RX ADMIN — CEFAZOLIN 2000 MG: 1 INJECTION, POWDER, FOR SOLUTION INTRAVENOUS at 14:29

## 2021-09-07 RX ADMIN — LIDOCAINE HYDROCHLORIDE 8 ML: 10 INJECTION, SOLUTION EPIDURAL; INFILTRATION; INTRACAUDAL; PERINEURAL at 14:44

## 2021-09-07 RX ADMIN — PANTOPRAZOLE SODIUM 40 MG: 40 INJECTION, POWDER, FOR SOLUTION INTRAVENOUS at 20:06

## 2021-09-07 RX ADMIN — INSULIN LISPRO 1 UNITS: 100 INJECTION, SOLUTION INTRAVENOUS; SUBCUTANEOUS at 16:17

## 2021-09-07 RX ADMIN — LABETALOL HYDROCHLORIDE 20 MG: 5 INJECTION INTRAVENOUS at 09:02

## 2021-09-07 RX ADMIN — FENTANYL CITRATE 25 MCG: 50 INJECTION, SOLUTION INTRAMUSCULAR; INTRAVENOUS at 14:31

## 2021-09-07 RX ADMIN — Medication: at 14:45

## 2021-09-07 RX ADMIN — LABETALOL HYDROCHLORIDE 20 MG: 5 INJECTION INTRAVENOUS at 20:06

## 2021-09-07 ASSESSMENT — PAIN SCALES - GENERAL
PAINLEVEL_OUTOF10: 0

## 2021-09-07 NOTE — PROGRESS NOTES
Speech Language Pathology  Hold   Tonie Crump  1975     Attempted to see pt for dysphagia therapy follow-up. Pt currently NPO for procedure. Will re-attempt to follow-up as pt is able to accept PO.     Thanks,  Anupama Ocampo, [address] [facility]  Speech Language Pathologist

## 2021-09-07 NOTE — PROGRESS NOTES
dilated (>2.1 cm) but collapses > 50% with respiration consistent with elevated RA pressure (8 mmHg). -Last echo-8/2020  moderate concentric LVH, normal size and function with EF of 70%, normal diastolic function      Associated problems:   Hypokalemia-needing repletion  Acidosis, non-anion gap-  Hypomagnesemia-  Hypophosphatemia  Anemia, chronic disease-worsening        Other major problems: Management per primary and other consulting teams.   //Acute hypoxic respiratory failure -needing intubation  // Multifocal airspace disease that likely represents a combination of aspiration/pneumonia and pulmonary edema  //Fluid overload  -COVID was ruled out  //History of uncontrolled diabetes last A1c was 11.3    // Hypertension un-controlled,       Hospital Problems         Last Modified POA    Pulmonary edema 8/28/2021 Yes    Chronic kidney disease (CKD), stage III (moderate) (HCC) 8/28/2021 Yes    Chronic diastolic (congestive) heart failure ([address] 8/28/2021 Yes    Acute on chronic diastolic heart failure ([facility] 8/28/2021 Yes    Acute respiratory failure ([address] 8/27/2021 Yes           Thank you for allowing me to participate in this patient's care. Please do not hesitate to contact me anytime. We will follow along with you. Fela Mata MD,  [facility] [address] [place] [address]: [phone] or [employer]  Fax: [phone]     Severally ill, at risk of impending organ failure needing ICU higher level of care/monitoring.    Time spent  35 minutes that included face-to-face meeting/discussion with patient's treatment team (including primary/referring team and other consultants; included coordination of care with the treatment team; and review of patient's electronic medical records and ordering appropriates tests.         ========================================================   ========================================================   Subjective:   No complaints today  Neg 15.6L since admission  Lower urine output. Past medical, Surgical, Social, Family medical history reviewed by me. MEDICATIONS: reviewed by me. Medications Prior to Admission:  No current facility-administered medications on file prior to encounter. Current Outpatient Medications on File Prior to Encounter   Medication Sig Dispense Refill    Dulaglutide 0.75 MG/0.5ML SOPN Inject 0.75 mg into the skin once a week 4 pen 1    ibuprofen (ADVIL;MOTRIN) 200 MG CAPS Take 1 capsule by mouth      furosemide (LASIX) 80 MG tablet Take 80 mg by mouth every morning 80mg in the morning 40mg in the evening      amLODIPine (NORVASC) 10 MG tablet Take 1 tablet by mouth daily 30 tablet 1    furosemide (LASIX) 40 MG tablet Take 1 tablet by mouth every evening 30 tablet 1    spironolactone (ALDACTONE) 50 MG tablet Take 1 tablet by mouth daily 30 tablet 2    insulin glargine (LANTUS;BASAGLAR) 100 UNIT/ML injection pen Inject 30 Units into the skin daily 4 pen 2    lisinopril (PRINIVIL;ZESTRIL) 40 MG tablet Take 1 tablet by mouth daily 30 tablet 2    atorvastatin (LIPITOR) 40 MG tablet Take 1 tablet by mouth nightly 30 tablet 3    Insulin Pen Needle 29G X 12.7MM MISC 1 each by Does not apply route daily 100 each 5    propranolol (INDERAL LA) 80 MG extended release capsule Take 1 capsule by mouth every morning 30 capsule 2    LORazepam (ATIVAN) 0.5 MG tablet Take 0.5 mg by mouth 2 times daily as needed for Anxiety.  aspirin 81 MG EC tablet Take 1 tablet by mouth daily 30 tablet 3    pregabalin (LYRICA) 75 MG capsule Take 1 capsule by mouth nightly for 7 days.  7 capsule 0    sertraline (ZOLOFT) 50 MG tablet Take 1 tablet by mouth daily 30 tablet 3    glucose monitoring kit (FREESTYLE) monitoring kit 1 kit by Does not apply route daily 1 kit 0    insulin lispro, 1 Unit Dial, 100 UNIT/ML SOPN Inject 0-6 Units into the skin 3 times daily (with meals) **Corrective Low Dose Algorithm**  Glucose: Dose:   No Insulin  140-199 1 Unit  200-249 2 Units  250-299 3 Units  300-349 4 Units  350-399 5 Units  Over 399 6 Units (Patient taking differently: Inject 6-12 Units into the skin 3 times daily (with meals) **Corrective Low Dose Algorithm**  Glucose: Dose:               No Insulin  140-199 1 Unit  200-249 2 Units  250-299 3 Units  300-349 4 Units  350-399 5 Units  Over 399 6 Units) 3 pen 0    glucose blood VI test strips (VICTORY AGM-4000 TEST) strip Test four times daily until controlled and then three times daily.   Code 250.02  ONE TOUCH ULTRA MONITOR 100 strip 12         Current Facility-Administered Medications:     labetalol (NORMODYNE;TRANDATE) injection 20 mg, 20 mg, IntraVENous, Q6H, Miguelina Sanders MD, 20 mg at 09/07/21 0902    hydrALAZINE (APRESOLINE) injection 20 mg, 20 mg, IntraVENous, Q4H PRN, Miguelina Sanders MD, 20 mg at 09/06/21 1654    0.9 % sodium chloride infusion, , IntraVENous, PRN, Iraida Gama MD    sodium chloride flush 0.9 % injection 5-40 mL, 5-40 mL, IntraVENous, Q12H, Jack Nino MD, 10 mL at 09/07/21 0001    sodium chloride flush 0.9 % injection 5-40 mL, 5-40 mL, IntraVENous, PRN, Iraida Gama MD    epoetin yohana (EPOGEN;PROCRIT) injection 10,000 Units, 10,000 Units, SubCUTAneous, Every Other Day, Hermann Clemens MD, 10,000 Units at 09/06/21 1319    fluticasone (FLONASE) 50 MCG/ACT nasal spray 1 spray, 1 spray, Each Nostril, PRN, Zachary Burris MD    LORazepam (ATIVAN) injection 0.5 mg, 0.5 mg, IntraVENous, Q4H PRN, Calvin West MD, 0.5 mg at 09/05/21 0607    cloNIDine (CATAPRES) 0.2 MG/24HR 1 patch, 1 patch, TransDERmal, Weekly, Miguelina Sanders MD, 1 patch at 09/03/21 1145    heparin (porcine) injection 5,000 Units, 5,000 Units, SubCUTAneous, 3 times per day, Fatoumata Mandujano MD, 5,000 Units at 09/07/21 0610    albumin human 25 % IV solution 25 g, 25 g, IntraVENous, PRN, Hermann Clemens MD, Stopped at 09/01/21 1439    heparin (porcine) injection 2,400 Units, 2,400 Units, IntraCATHeter, PRN, Leonora Masterson MD    furosemide (LASIX) injection 40 mg, 40 mg, IntraVENous, TID, Leonora Masterson MD, 40 mg at 09/07/21 0906    insulin lispro (1 Unit Dial) 0-6 Units, 0-6 Units, SubCUTAneous, Q4H, Brittany Dejesus, DO, 1 Units at 09/07/21 0812    glucose (GLUTOSE) 40 % oral gel 15 g, 15 g, Oral, PRN, Ahmad A Oleg, DO    dextrose 50 % IV solution, 12.5 g, IntraVENous, PRN, Quentinmad A Oleg, DO    glucagon (rDNA) injection 1 mg, 1 mg, IntraMUSCular, PRN, Reymundod IRA Archerzi, DO    dextrose 5 % solution, 100 mL/hr, IntraVENous, PRN, Quentinmad A Oleg, DO    pantoprazole (PROTONIX) injection 40 mg, 40 mg, IntraVENous, BID, Manuela Noland MD, 40 mg at 09/07/21 0906    fentaNYL (SUBLIMAZE) injection 50 mcg, 50 mcg, IntraVENous, Q1H PRN, Lorenza Dias MD    sodium chloride flush 0.9 % injection 5-40 mL, 5-40 mL, IntraVENous, 2 times per day, Brittany Dejesus, DO, 10 mL at 09/07/21 0903    sodium chloride flush 0.9 % injection 5-40 mL, 5-40 mL, IntraVENous, PRN, Reymundod A Oleg, DO, 10 mL at 09/05/21 1657    0.9 % sodium chloride infusion, 25 mL, IntraVENous, PRN, Kaci Dejesus, DO, Stopped at 08/29/21 2301    ondansetron (ZOFRAN-ODT) disintegrating tablet 4 mg, 4 mg, Oral, Q8H PRN **OR** ondansetron (ZOFRAN) injection 4 mg, 4 mg, IntraVENous, Q6H PRN, Quentinmad IRA DewittOleg, DO    polyethylene glycol (GLYCOLAX) packet 17 g, 17 g, Oral, Daily PRN, Delmi Archerzi, DO    acetaminophen (TYLENOL) tablet 650 mg, 650 mg, Oral, Q6H PRN **OR** acetaminophen (TYLENOL) suppository 650 mg, 650 mg, Rectal, Q6H PRN, Brittany Dejesus, , 650 mg at 09/02/21 1648      REVIEW OF SYSTEMS:     Review of systems not obtained due to patient factors - intubation       PHYSICAL EXAM:  Recent vital signs and recent I/Os reviewed by me.      Wt Readings from Last 3 Encounters:   09/07/21 179 lb 14.3 oz (81.6 kg)   07/08/21 244 lb 11.2 oz (111 kg)   02/26/21 237 lb 3.2 oz (107.6 kg)     BP Readings from Last 3 Encounters:   09/07/21 (!) 195/79   07/08/21 (!) 160/100   02/26/21 133/86     Patient Vitals for the past 24 hrs:   BP Temp Temp src Pulse Resp SpO2 Height Weight   09/07/21 1200 (!) 195/79 97.7 °F (36.5 °C) Temporal 99 16 98 % -- --   09/07/21 0811 (!) 157/54 97.5 °F (36.4 °C) Temporal 101 17 95 % -- --   09/07/21 0334 -- -- -- -- 20 95 % -- --   09/07/21 0333 (!) 159/59 98 °F (36.7 °C) Temporal 106 18 -- -- 179 lb 14.3 oz (81.6 kg)   09/07/21 0000 (!) 143/56 97.9 °F (36.6 °C) Temporal 102 17 94 % -- --   09/06/21 2329 -- -- -- -- 17 95 % -- --   09/06/21 2052 -- -- -- -- 17 97 % -- --   09/06/21 2045 138/68 97.9 °F (36.6 °C) Temporal 112 21 98 % -- --   09/06/21 1700 -- -- -- 107 20 98 % -- --   09/06/21 1630 (!) 170/77 97 °F (36.1 °C) Temporal 105 18 -- -- --   09/06/21 1615 (!) 175/72 -- -- 105 17 -- -- --   09/06/21 1600 (!) 176/73 -- -- 104 18 -- -- --   09/06/21 1545 (!) 162/72 -- -- 102 23 -- -- --   09/06/21 1530 (!) 160/71 97.4 °F (36.3 °C) -- 102 16 -- -- --   09/06/21 1515 (!) 162/73 97.6 °F (36.4 °C) Temporal 101 19 -- -- --   09/06/21 1501 (!) 150/68 -- -- 99 15 -- -- --   09/06/21 1500 (!) 150/68 97.8 °F (36.6 °C) Temporal 99 18 -- -- --   09/06/21 1455 138/63 97.4 °F (36.3 °C) Temporal 98 18 -- -- --   09/06/21 1452 (!) 140/68 97.4 °F (36.3 °C) Temporal 104 16 -- -- --   09/06/21 1445 (!) 177/87 -- -- 118 23 -- -- --   09/06/21 1441 (!) 170/76 97.6 °F (36.4 °C) -- 117 23 -- -- --   09/06/21 1411 -- -- -- -- -- -- 5' 6\" (1.676 m) --       Intake/Output Summary (Last 24 hours) at 9/7/2021 1355  Last data filed at 9/7/2021 0615  Gross per 24 hour   Intake 807 ml   Output 425 ml   Net 382 ml          Constitutional: Poorly responsive, Intubated and  obese habitus  Eyes: Conjunctiva clear and Noscleral icterus  Ear, Nose, and Throat: moist oral mucosa; ET to vent  Neck: Trachea midline,  No jugular venous distension  Cardiovascular: Regular rate and ryhthm, normal S1 and S2,    Respiratory: Mechanically ventilated; Lung sounds notable for synchronous vent-associated breath sounds  Abdomen:  distended. Normal bowel sounds. : Meza catheter is inserted, draining urine  Skin: no rash,  bruises on visible body area  Musculoskeletal: No clubbing or cyanosis of digits. and Normocephalic. Extremitties: +++ peripheral edema, no deformities. Neurologic:Unable to obtain as intubated/sedated. Psychiatric: Unable to obtain as intubated/sedated. DATA:  Diagnostic tests reviewed by me for today's visit:   (AS NEEDED FOR MY EVALUATION AND MANAGEMENT). Recent Labs     09/05/21  0355 09/06/21  0403 09/07/21  0335   WBC 28.6* 19.6* 15.8*   HCT 23.9* 22.3* 27.6*    309 290     Iron Saturation:  No components found for: PERCENTFE  FERRITIN:    Lab Results   Component Value Date    FERRITIN 112.0 08/28/2021     IRON:    Lab Results   Component Value Date    IRON 22 08/28/2021     TIBC:    Lab Results   Component Value Date    TIBC 184 08/28/2021       Recent Labs     09/05/21  0355 09/06/21 0403 09/07/21  0335    140 139   K 3.8 3.8 3.9    105 108   CO2 16* 14* 16*   BUN 29* 40* 26*   CREATININE 3.3* 4.3* 3.4*     Recent Labs     09/05/21 0355 09/06/21  0403 09/07/21  0335   CALCIUM 9.0 8.7 8.5   MG 2.20 2.30 2.00   PHOS 4.4 5.0* 3.2     No results for input(s): PH, PCO2, PO2 in the last 72 hours.     Invalid input(s): William Burdick    ABG:  No results found for: PH, PCO2, PO2, HCO3, BE, THGB, TCO2, O2SAT  VBG:    Lab Results   Component Value Date    PHVEN 7.352 09/07/2021    VJK1FZI 34.3 02/23/2021    BEVEN -4.6 02/23/2021    N2QKDEVK 100 02/23/2021       LDH:  No results found for: LDH  Uric Acid:  No results found for: LABURIC, URICACID    PT/INR:    Lab Results   Component Value Date    PROTIME 11.3 09/07/2021    INR 1.00 09/07/2021     Warfarin PT/INR:  No components found for: PTPATWAR, PTINRWAR  PTT:    Lab Results   Component Value Date    APTT 33.5 09/07/2021 [APTT}  Last 3 Troponin:    Lab Results   Component Value Date    TROPONINI 0.23 08/28/2021    TROPONINI 0.22 08/27/2021    TROPONINI 0.24 08/27/2021       U/A:    Lab Results   Component Value Date    COLORU YELLOW 08/27/2021    PROTEINU >300 08/27/2021    PHUR 6.0 08/28/2021    WBCUA 7 08/27/2021    RBCUA 3 08/27/2021    CLARITYU Clear 08/27/2021    SPECGRAV 1.013 08/27/2021    LEUKOCYTESUR Negative 08/27/2021    UROBILINOGEN 0.2 08/27/2021    BILIRUBINUR Negative 08/27/2021    BLOODU SMALL 08/27/2021    GLUCOSEU 250 08/27/2021     Microalbumen/Creatinine ratio:  No components found for: RUCREAT  24 Hour Urine for Protein:  No components found for: RAWUPRO, UHRS3, IDNO19PK, UTV3  24 Hour Urine for Creatinine Clearance:  No components found for: CREAT4, UHRS10, UTV10  Urine Toxicology:  No components found for: Shameka Baptise, IBENZO, ICOCAINE, IMARTHC, IOPIATES, IPHENCYC    HgBA1c:    Lab Results   Component Value Date    LABA1C 5.7 08/27/2021     RPR:  No results found for: RPR  HIV:  No results found for: HIV  NILSA:    Lab Results   Component Value Date    NILSA Negative 04/25/2020     RF:  No results found for: RF  DSDNA:  No components found for: DNA  AMYLASE:  No results found for: AMYLASE  LIPASE:    Lab Results   Component Value Date    LIPASE 14.0 04/25/2020     Fibrinogen Level:  No components found for: FIB       BELOW MENTIONED RADIOLOGY STUDY RESULTS BY ME (AS NEEDED FOR MY EVALUATION AND MANAGEMENT).      CT HEAD WO CONTRAST    Result Date: 8/27/2021  EXAMINATION: CT OF THE HEAD WITHOUT CONTRAST; CT OF THE CHEST WITHOUT CONTRAST  8/27/2021 7:12 pm TECHNIQUE: CT of the head was performed without the administration of intravenous contrast. Dose modulation, iterative reconstruction, and/or weight based adjustment of the mA/kV was utilized to reduce the radiation dose to as low as reasonably achievable.; CT of the chest was performed without the administration of intravenous contrast. Multiplanar reformatted images are provided for review. Dose modulation, iterative reconstruction, and/or weight based adjustment of the mA/kV was utilized to reduce the radiation dose to as low as reasonably achievable. COMPARISON: CT head 08/27/2020. HISTORY: ORDERING SYSTEM PROVIDED HISTORY: AMS TECHNOLOGIST PROVIDED HISTORY: Has a \"code stroke\" or \"stroke alert\" been called? ->No Reason for exam:->AMS Decision Support Exception - unselect if not a suspected or confirmed emergency medical condition->Emergency Medical Condition (MA) Is the patient pregnant?->No Reason for Exam: Respiratory Distress (Pt brought in per [facility] after call from  for resp distress. O2 sat 70's, BMV in route. Pt will open eyes to voice. Pupils 8mm and fixed. ) Acuity: Acute Type of Exam: Initial; ORDERING SYSTEM PROVIDED HISTORY: AMS, respiratory distress TECHNOLOGIST PROVIDED HISTORY: Reason for exam:->AMS, respiratory distress Is the patient pregnant?->No Reason for Exam: Respiratory Distress (Pt brought in per [facility] after call from  for resp distress. O2 sat 70's, BMV in route. Pt will open eyes to voice. Pupils 8mm and fixed. ) Acuity: Acute Type of Exam: Initial CT HEAD FINDINGS: BRAIN/VENTRICLES: No acute hemorrhage. Periventricular and subcortical hypoattenuation is nonspecific. Interval chronic lacunar infarcts in the left corona radiata, thalamus, and internal capsule. Artifact partially obscures the laureano. Artifact partially obscures the inferior cerebellum. Jerral Dylan white differentiation appears maintained given artifact near the skull base and through the posterior fossa. Mild prominence of the ventricles noted. Overall ventricle size appears increased from prior exam.  There is no midline shift. Basal cisterns appear patent. ORBITS: Visualized orbits appear unremarkable on this unenhanced exam. SINUSES: Mild mucosal thickening of the ethmoid and sphenoid sinuses. Visualized mastoid air cells appear clear. SOFT TISSUES/SKULL: No depressed calvarial fracture. Fluid in the nasopharynx and oropharynx. CT HEAD IMPRESSION: No acute hemorrhage or midline shift. Increased ventricle size compared to prior exam.  Correlate with presentation to exclude acute hydrocephalus. Other findings as described. CT CHEST FINDINGS: Mediastinum: Heart is borderline enlarged. Trace pericardial effusion or thickening. Aorta is within normal limits in size. Pulmonary arteries are within normal limits in size. . Lungs/pleura: Central airways are patent. Endotracheal tube with tip terminating in the distal 3rd subglottic trachea. Secretions noted in the trachea. Opacification of segmental and subsegmental bronchi. Ground-glass the confluent airspace disease. Interlobular septal thickening. Small to moderate pleural effusions. No pneumothorax. Soft Tissues/Bones: Suboptimal evaluation for adenopathy without intravenous contrast. Mediastinal adenopathy. Diffuse body wall edema. Scattered degenerative changes noted in the visualized spine without spondylolisthesis. Upper Abdomen: Limited images of the upper abdomen are unremarkable given lack of intravenous contrast. CT CHEST IMPRESSION: 1.   1. Multifocal airspace disease that likely represents a combination of aspiration/pneumonia and pulmonary edema. 2. Other findings as described. CT CHEST WO CONTRAST    Result Date: 8/27/2021  EXAMINATION: CT OF THE HEAD WITHOUT CONTRAST; CT OF THE CHEST WITHOUT CONTRAST  8/27/2021 7:12 pm TECHNIQUE: CT of the head was performed without the administration of intravenous contrast. Dose modulation, iterative reconstruction, and/or weight based adjustment of the mA/kV was utilized to reduce the radiation dose to as low as reasonably achievable.; CT of the chest was performed without the administration of intravenous contrast. Multiplanar reformatted images are provided for review.  Dose modulation, iterative reconstruction, and/or weight based IMPRESSION: No acute hemorrhage or midline shift. Increased ventricle size compared to prior exam.  Correlate with presentation to exclude acute hydrocephalus. Other findings as described. CT CHEST FINDINGS: Mediastinum: Heart is borderline enlarged. Trace pericardial effusion or thickening. Aorta is within normal limits in size. Pulmonary arteries are within normal limits in size. . Lungs/pleura: Central airways are patent. Endotracheal tube with tip terminating in the distal 3rd subglottic trachea. Secretions noted in the trachea. Opacification of segmental and subsegmental bronchi. Ground-glass the confluent airspace disease. Interlobular septal thickening. Small to moderate pleural effusions. No pneumothorax. Soft Tissues/Bones: Suboptimal evaluation for adenopathy without intravenous contrast. Mediastinal adenopathy. Diffuse body wall edema. Scattered degenerative changes noted in the visualized spine without spondylolisthesis. Upper Abdomen: Limited images of the upper abdomen are unremarkable given lack of intravenous contrast. CT CHEST IMPRESSION: 1.   1. Multifocal airspace disease that likely represents a combination of aspiration/pneumonia and pulmonary edema. 2. Other findings as described. XR CHEST PORTABLE    Result Date: 8/28/2021  EXAMINATION: ONE XRAY VIEW OF THE CHEST 8/28/2021 1:21 am COMPARISON: Chest x-ray dated 02/24/2021. Moises Sarah HISTORY: ORDERING SYSTEM PROVIDED HISTORY: central line and OG placement TECHNOLOGIST PROVIDED HISTORY: Reason for exam:->central line and OG placement FINDINGS: LINES/TUBES/OTHER: Endotracheal tube is noted with its tip approximately 5 cm from the ana. Enteric tube is noted coursing below the level of the diaphragm and within the stomach. Left IJ central venous catheter is noted with its tip projecting over the area of the left brachiocephalic vein. HEART/MEDIASTINUM: The cardiac silhouette is mildly enlarged, but stable.  PLEURA/LUNGS: There is perihilar fullness and increased interstitial markings which may reflect pulmonary vascular congestion in the correct clinical setting. There is left basilar reflecting airspace disease, atelectasis or pleural effusion. There is right basilar airspace disease. There is no appreciable pneumothorax. BONES/SOFT TISSUE: No acute abnormality. Endotracheal tube and enteric tube are in satisfactory position. The left IJ central venous catheter tip projects over the area of the left brachiocephalic vein. Right basilar airspace disease. Left basilar opacification reflecting airspace disease, atelectasis or pleural effusion. Conclusions      Summary   *Left ventricle - moderate concentric LVH, normal size and function with EF   of 55%   *Mitral valve - mild regurgitation   *Tricuspid valve - trivial regurgitation   *Bubble study - negative      Signature      ------------------------------------------------------------------   Electronically signed by Gomez Jeff MD (Interpreting   physician) on 08/31/2020 at 02:48 PM   ------------------------------------------------------------------         Summary   Left ventricular systolic function is normal with ejection fraction   estimated at 55-60 %. No regional wall motion abnormalities are noted. There is mild left ventricular hypertrophy. Normal left ventricular diastolic filling pressure. Moderate mitral regurgitation. Mild pulmonic regurgitation present. IVC size is dilated (>2.1 cm) but collapses > 50% with respiration   consistent with elevated RA pressure (8 mmHg). **There is a small-moderate bilateral pleural effusion. **There is a small circumferential pericardial effusion noted.       Previous echo done 2020 - EF 55%      Signature      ------------------------------------------------------------------   Electronically signed by Javid Rayo MD   (Interpreting physician) on 08/30/2021 at 04:21 PM ------------------------------------------------------------------

## 2021-09-07 NOTE — PROGRESS NOTES
Hospitalist Progress Note      PCP: Tiny Clamp    Date of Admission: 8/27/2021    Chief Complaint: Respiratory distress    Hospital Course: 55 y.o. female who presented to [facility] with past medical history of hypertension, type 2 diabetes, CKD stage IV, HFpEF, hyperlipidemia presented to the ED with chief complaint of respiratory distress.     History cannot be obtained due to patient being intubated sedated. On chart review patient had 3 or 4 arrival sitting in bed and also having some new onset dyspnea that was severe sudden onset and then started progressively turning blue in the lips for her  and EMS was called noted to have saturation 60% on muscles brought here emergently.   Patient in the ED noted was unable to protect her airway thus was emergently intubated for lifesaving measures.       Subjective:   Extubated, hypertensive, leukocytosis improving, creatinine 4.3, blood culture so far negative, HD today, she failed speech eval, we are getting physical therapy to evaluate, labetalol as needed for hypertension, now scheduled per cardiology      Medications:  Reviewed    Infusion Medications    Heparin (Porcine) Stopped (09/07/21 1507)    sodium chloride      dextrose      sodium chloride Stopped (08/29/21 2301)     Scheduled Medications    [START ON 9/8/2021] furosemide  80 mg Oral Daily    [START ON 9/8/2021] epoetin yohana  10,000 Units IntraVENous Q MWF    labetalol  20 mg IntraVENous Q6H    sodium chloride flush  5-40 mL IntraVENous Q12H    cloNIDine  1 patch TransDERmal Weekly    heparin (porcine)  5,000 Units SubCUTAneous 3 times per day    insulin lispro  0-6 Units SubCUTAneous Q4H    pantoprazole  40 mg IntraVENous BID    sodium chloride flush  5-40 mL IntraVENous 2 times per day     PRN Meds: hydrALAZINE, sodium chloride, sodium chloride flush, fluticasone, LORazepam, albumin human, heparin (porcine), glucose, dextrose, glucagon (rDNA), dextrose, fentanNYL, sodium chloride flush, sodium chloride, ondansetron **OR** ondansetron, polyethylene glycol, acetaminophen **OR** acetaminophen      Intake/Output Summary (Last 24 hours) at 9/7/2021 1517  Last data filed at 9/7/2021 1200  Gross per 24 hour   Intake 420 ml   Output 440 ml   Net -20 ml       Physical Exam Performed:    BP (!) 189/72   Pulse 100   Temp 97.7 °F (36.5 °C) (Temporal)   Resp 14   Ht 5' 6\" (1.676 m)   Wt 179 lb 14.3 oz (81.6 kg)   SpO2 92%   BMI 29.04 kg/m²     General appearance: In no acute distress  HEENT: Pupils equal, round, and reactive to light. Conjunctivae/corneas clear. Neck: Supple, with full range of motion. No jugular venous distention. Trachea midline. Respiratory: has some rales present at the bases bilaterally on her exam.  Eezes/Rhonchi. Cardiovascular: Regular rate and rhythm with normal S1/S2 without murmurs, rubs or gallops. Abdomen: Soft, non-tender, non-distended with normal bowel sounds. Musculoskeletal: No clubbing, cyanosis or edema bilaterally. Full range of motion without deformity. Skin: Skin color, texture, turgor normal.  No rashes or lesions.   Neurologic: Alert and talking, seems very weak, but able to move extremities  Psychiatric: Not agitated  Capillary Refill: Brisk,3 seconds, normal   Peripheral Pulses: +2 palpable, equal bilaterally       Labs:   Recent Labs     09/05/21 0355 09/06/21 0403 09/07/21  0335   WBC 28.6* 19.6* 15.8*   HGB 7.5* 7.0* 8.9*   HCT 23.9* 22.3* 27.6*    309 290     Recent Labs     09/05/21  0355 09/06/21 0403 09/07/21  0335    140 139   K 3.8 3.8 3.9    105 108   CO2 16* 14* 16*   BUN 29* 40* 26*   CREATININE 3.3* 4.3* 3.4*   CALCIUM 9.0 8.7 8.5   PHOS 4.4 5.0* 3.2     Recent Labs     09/05/21  0355 09/06/21 0403 09/07/21  0335   AST 12* 12* 14*   ALT 8* 8* 9*   BILITOT <0.2 <0.2 0.3   ALKPHOS 119 113 110     Recent Labs     09/07/21  0959   INR 1.00     Recent Labs     09/05/21  0355 09/06/21  0403 09/07/21  0335   CKTOTAL 34 46 74       Urinalysis:      Lab Results   Component Value Date    NITRU Negative 08/27/2021    WBCUA 7 08/27/2021    RBCUA 3 08/27/2021    BLOODU SMALL 08/27/2021    SPECGRAV 1.013 08/27/2021    GLUCOSEU 250 08/27/2021       Radiology:  XR CHEST PORTABLE   Final Result   Persistent pulmonary edema and left basilar atelectasis or airspace disease. Persistent small left pleural effusion. Indeterminate positioning of left internal jugular catheter, though similar   to prior. XR CHEST PORTABLE   Final Result   Stable support apparatus. Persistent findings suggestive of pulmonary edema with small bilateral   pleural effusions, left greater than right. There is more focal consolidation in the left lung base, atelectasis versus   pneumonia. XR CHEST PORTABLE   Final Result   There has been placement of a right internal jugular temporary dialysis   catheter with tip over the upper right atrium. Remainder of support   apparatus is stable in appearance. Persistent findings of pulmonary edema with probable small bilateral pleural   effusions. IR FLUORO GUIDED CVA DEVICE PLMT/REPLACE/REMOVAL   Final Result   Successful ultrasound and fluoroscopy guided placement of a temporary   dialysis catheter. XR CHEST PORTABLE   Final Result   Extensive bilateral pulmonary disease and pleural effusion, no significant   change over the past 48 hours. XR CHEST PORTABLE   Final Result   Enlargement of the cardiac silhouette with central vascular congestion as   well as bibasilar infiltrates and pleural effusions, which may represent a   combination of pulmonary edema as well as potential pneumonia. XR CHEST PORTABLE   Final Result   Endotracheal tube and enteric tube are in satisfactory position. The left IJ central venous catheter tip projects over the area of the left   brachiocephalic vein. Right basilar airspace disease.       Left basilar opacification reflecting airspace disease, atelectasis or   pleural effusion. CT CHEST WO CONTRAST   Final Result      CT HEAD WO CONTRAST   Final Result      NM LUNG VENT/PERFUSION (VQ)    (Results Pending)   VL Renal Arterial Duplex Complete    (Results Pending)   IR TUNNELED CVC PLACE WO SQ PORT/PUMP > 5 YEARS    (Results Pending)           Assessment/Plan:    Active Hospital Problems    Diagnosis     Acute respiratory failure (HCC) [J96.00]     Acute on chronic diastolic heart failure (HCC) [I50.33]     Chronic kidney disease (CKD), stage III (moderate) (HCC) [N18.30]     Chronic diastolic (congestive) heart failure (HCC) [I50.32]     Pulmonary edema [J81.1]        Acute hypoxic respiratory failure requiring mechanical ventilation, status post liberation from mechanical ventilation  Continue to monitor  Covid testing negative  Completed course of cefepime, leukocytosis improving  CCM obtained procalcitonin 0.87, leukocytosis WBC 22>28>19, steroid has been stopped per pulmonary, she is afebrile. Blood cultures so far negative was taken yesterday.     Acute pulmonary edema  proBNP trending down, patient was on Lasix per pulmonary  Creatinine improving on Lasix 40 mg twice daily, nephrology following. She is getting HD again today    Acute on chronic HFpEF exacerbation  Has been on Lasix 3 times daily   Nephro is following    Diastolic dysfunction  Patient admitted with hypoxic respiratory failure  Cardiology following recommending [facility] when medically stable probably next week     Pneumonia  Earlier this admission suspected on CT imaging with leukocytosis  Completed 5-day course of cefepime    Sepsis  Leukocytosis, worsening she is, left IJ which can be removed, Vas-Cath on the right IJ with no signs of infection. Obtain blood culture  Procalcitonin 0.87, sputum culture, currently off antibiotic  Had completed a course of cefepime. Blood culture grew staph epidermidis in 1 culture. Probable contamination     Hypertensive urgency, improving  On lasix, monitor BP     Acute on chronic renal failure  Being followed by nephrology  Had started HD on 8/31 with UF 1.8 L    Getting HD  Negative balance of 9 L    Normocytic anemia  Iron panel ordered, Hemoccult     Chronic medical conditions  Type 2 Diabetes: Insulin sliding scale  Hyperlipidemia: Continue home medication  Class II obesity:Complicating assessment and treatment. Placing patient at risk for multiple co-morbidities as well as early death and contributing to the patient's presentation. Education, and counseling     DVT Prophylaxis: heparin SQ  Diet: ADULT DIET; Dysphagia - Pureed; 5 carb choices (75 gm/meal)  Adult Oral Nutrition Supplement; Frozen Oral Supplement  Code Status: Full Code    PT/OT Eval Status and disposition. Obtain PT/OT for evaluation today , also need speech evaluation  She has been working with speech therapy,  Needing modified diet, will obtain PT for evaluation, she seems very weak, , still hypertensive cardiology started her on labetalol, admitted for acute respiratory failure and pulmonary edema and KINZA on CKD, had been on hemodialysis, cardiology pulmonary and nephrology following probably will need ECF.        Annie Novoa MD

## 2021-09-07 NOTE — PROGRESS NOTES
Called patient  Leonora Negro for consent for Tunneled HD catheter that will happen this shift. Will monitor.

## 2021-09-07 NOTE — PROGRESS NOTES
[address]   Cardiology Progress Note     Date: 9/7/2021  Admit Date: 8/27/2021     Reason for consultation: elevated troponin     Chief Complaint:   Chief Complaint   Patient presents with    Respiratory Distress     Pt brought in per [facility] after call from  for resp distress. O2 sat 70's, BMV in route. Pt will open eyes to voice. Pupils 8mm and fixed. History of Present Illness: History obtained from patient and medical record. Ignacia Johnson is a 55 y.o. female presented to the hospital with complaints of respiratory distress. She had sudden onset of SOB and came to the ED a few hours later. She was intubated for hypoxia sO2 60% in the ED. Troponin, bnp, creatinine, Alk phos are all elevated. Lactate normal. CT scan shows pulmonary edema and PNA. Interval Hx: Today, she reports feeling ok. Talks in a whisper with her eyes closed. Oriented to person and place. Denies any chest pain. Has been weaned to RA. Laying almost flat and appears to be breathing comfortably. Tele stable. Patient seen and examined. Clinical notes reviewed. Telemetry reviewed. No new complaints today. No major events overnight. Denies having chest pain, palpitations, shortness of breath, orthopnea/PND, cough, or dizziness at the time of this visit.       Past Medical History:  Past Medical History:   Diagnosis Date    Blind in both eyes     Cerebral artery occlusion with cerebral infarction ([address]     CHF (congestive heart failure) (HCC)     Chronic kidney disease     Depression     Diabetes mellitus out of control ([address]     Diabetes mellitus, type II ([address]     2005    Diabetic neuropathy associated with type 2 diabetes mellitus ([address]     Generalized headaches     Hypertension     Infertility     Insomnia     chronic vs lack of time spent to sleep    Migraine headache 11/09/2011    Mixed hyperlipidemia     Otitis media     h/o recurrent    Pelvic abscess in female 10/05/2013    Pneumonia     2004 approx.  Stroke [facility]) 08/27/2020        Past Surgical History:    has a past surgical history that includes Cervix surgery; eye surgery; Foot surgery (Right); Foot surgery (Bilateral); and Foot surgery (Left). Social History:  Reviewed. reports that she has been smoking cigarettes. She has been smoking about 1.00 pack per day. She has never used smokeless tobacco. She reports that she does not drink alcohol and does not use drugs. Allergies: Allergies   Allergen Reactions    Amoxicillin Itching and Hives     Tolerates cephalosporins  Patient tolerating cefazolin (ANCEF) as of October 11, 2018  Patient tolerating cefazolin (ANCEF) as of October 11, 2018  Tolerates cephalosporins  Patient tolerating cefazolin (ANCEF) as of October 11, 2018    Levofloxacin Anaphylaxis    Vancomycin Shortness Of Breath, Hives and Anaphylaxis    Tape [Adhesive Tape] Other (See Comments)     Paper tape turns skin bright red. Plastic tape okay. Family History:  Reviewed. family history includes Alcohol Abuse in her maternal grandfather and paternal grandfather; Monda Pap in her father; Diabetes in her father, mother, paternal aunt, paternal grandmother, paternal uncle, and sister; High Blood Pressure in her father; Other (age of onset: 79) in her mother. Denies family history of sudden cardiac death, arrhythmia, premature CAD    Home Meds:  Prior to Visit Medications    Medication Sig Taking?  Authorizing Provider   Dulaglutide 0.75 MG/0.5ML SOPN Inject 0.75 mg into the skin once a week  Damon Mireles   ibuprofen (ADVIL;MOTRIN) 200 MG CAPS Take 1 capsule by mouth  Historical Provider, MD   furosemide (LASIX) 80 MG tablet Take 80 mg by mouth every morning 80mg in the morning 40mg in the evening  Historical Provider, MD   amLODIPine (NORVASC) 10 MG tablet Take 1 tablet by mouth daily  Damon Mireles   furosemide (LASIX) 40 MG tablet Take 1 tablet by mouth every evening  [facility] IntraVENous Q12H    epoetin yohana  10,000 Units SubCUTAneous Every Other Day    cloNIDine  1 patch TransDERmal Weekly    heparin (porcine)  5,000 Units SubCUTAneous 3 times per day    furosemide  40 mg IntraVENous TID    insulin lispro  0-6 Units SubCUTAneous Q4H    pantoprazole  40 mg IntraVENous BID    sodium chloride flush  5-40 mL IntraVENous 2 times per day     Continuous Infusions:   sodium chloride      dextrose      sodium chloride Stopped (08/29/21 2301)     PRN Meds:hydrALAZINE, sodium chloride, sodium chloride flush, fluticasone, LORazepam, albumin human, heparin (porcine), glucose, dextrose, glucagon (rDNA), dextrose, fentanNYL, sodium chloride flush, sodium chloride, ondansetron **OR** ondansetron, polyethylene glycol, acetaminophen **OR** acetaminophen     Review of Systems:  · Constitutional: Negative for fever, night sweats, chills,  · Skin: Negative for rash, pruritus, bleeding, or bruising    · HEENT: Negative for vision changes, ringing in the ears, dysphagia  · Respiratory: Reviewed in HPI  · Cardiovascular: Reviewed in HPI  · Gastrointestinal: Negative for abdominal pain, N/V/D, constipation, or black/tarry stools  · Genito-Urinary: Negative for dysuria, incontinence, or hematuria  · Musculoskeletal: Negative for joint swelling, muscle pain, or injuries  · Neurological/Psych: Negative for confusion, seizures, headaches, or TIA-like symptoms. No anxiety or depression.      Physical Examination:  Vitals:    09/07/21 1200   BP: (!) 195/79   Pulse: 99   Resp: 16   Temp: 97.7 °F (36.5 °C)   SpO2: 98%      In: 420 [P.O.:120; Blood:300]  Out: 275    Wt Readings from Last 3 Encounters:   09/07/21 179 lb 14.3 oz (81.6 kg)   07/08/21 244 lb 11.2 oz (111 kg)   02/26/21 237 lb 3.2 oz (107.6 kg)       Intake/Output Summary (Last 24 hours) at 9/7/2021 1338  Last data filed at 9/7/2021 0615  Gross per 24 hour   Intake 807 ml   Output 425 ml   Net 382 ml       Telemetry: Personally Reviewed  - Sinus rhythm   · Constitutional: Cooperative and in no apparent distress, and appears ill   · Skin: Warm and pink. appears pale. no cyanosis, clubbing, or bruising   · HEENT: Symmetric and normocephalic. PERRL. Conjunctiva pink with clear sclera. Mucus membranes moist.   · Cardiovascular: Regular rate and rhythm. S1/S2 present without murmurs, no rubs or gallops. Peripheral pulses 2+, capillary refill < 3 seconds. No elevation of JVP. No peripheral edema  · Respiratory: Respirations symmetric and unlabored. Lungs clear to auscultation bilaterally, no wheezing, crackles, or rhonchi  · Gastrointestinal: Abdomen soft and round. Bowel sounds active without tenderness. · Musculoskeletal: Bilateral upper and lower extremity strength with generalized weakness   · Neurologic/Psych: Awake and orientated to person, place. Disoriented to time and president. Calm affect. Pertinent labs, diagnostic, device, and imaging results reviewed as a part of this visit    Labs:    BMP:   Recent Labs     09/05/21  0355 09/06/21 0403 09/07/21  0335    140 139   K 3.8 3.8 3.9    105 108   CO2 16* 14* 16*   PHOS 4.4 5.0* 3.2   BUN 29* 40* 26*   CREATININE 3.3* 4.3* 3.4*   MG 2.20 2.30 2.00     Estimated Creatinine Clearance: 22 mL/min (A) (based on SCr of 3.4 mg/dL (H)).    CBC:   Recent Labs     09/05/21  0355 09/06/21 0403 09/07/21  0335   WBC 28.6* 19.6* 15.8*   HGB 7.5* 7.0* 8.9*   HCT 23.9* 22.3* 27.6*   MCV 90.0 89.7 87.1    309 290     Thyroid: No results found for: TSH, U8BEFCR, M9CHCOF, THYROIDAB  Lipids:   Lab Results   Component Value Date    CHOL 164 02/24/2021    HDL 41 02/24/2021    HDL 34 11/09/2011    TRIG 319 08/31/2021     LFTS:   Lab Results   Component Value Date    ALT 9 09/07/2021    AST 14 09/07/2021    ALKPHOS 110 09/07/2021    PROT 5.7 09/07/2021    PROT 6.8 11/09/2011    AGRATIO 0.9 09/07/2021    BILITOT 0.3 09/07/2021     Cardiac Enzymes:   Lab Results   Component Value Date    CKTOTAL 74 2021    TROPONINI 0.23 2021    TROPONINI 0.22 2021    TROPONINI 0.24 2021     Coags:   Lab Results   Component Value Date    PROTIME 11.3 2021    INR 1.00 2021       EC21   Normal sinus rhythm  Nonspecific T wave abnormality  Prolonged QT  Abnormal ECG    Echo: 20  Summary    *Left ventricle - moderate concentric LVH, normal size and function with EF of 55%   *Mitral valve - mild regurgitation   *Tricuspid valve - trivial regurgitation   *Bubble study - negative    ECHO:  21  Summary   Left ventricular systolic function is normal with ejection fraction   estimated at 55-60 %. No regional wall motion abnormalities are noted. There is mild left ventricular hypertrophy. Normal left ventricular diastolic filling pressure. Moderate mitral regurgitation. Mild pulmonic regurgitation present. IVC size is dilated (>2.1 cm) but collapses > 50% with respiration   consistent with elevated RA pressure (8 mmHg). **There is a small-moderate bilateral pleural effusion. **There is a small circumferential pericardial effusion noted. Previous echo done  - EF 55%    CT chest: 21  CT CHEST IMPRESSION:   1.   1. Multifocal airspace disease that likely represents a combination of   aspiration/pneumonia and pulmonary edema. 2. Other findings as described.      Problem List:   Patient Active Problem List    Diagnosis Date Noted    Acute respiratory failure ([address] 2021    Diabetic peripheral neuropathy ([address] 2021    Acute on chronic diastolic heart failure ([address] 2021    Isolated proteinuria 2020    Panic disorder without agoraphobia     History of CVA (cerebrovascular accident)     Arterial ischemic stroke, ICA, left, acute ([address]     HTN (hypertension), benign     DM (diabetes mellitus), secondary, uncontrolled, w/neurologic complic ([address]     Dyslipidemia     Smoker     Right sided numbness 2020    Tobacco dependence 08/27/2020    Chronic kidney disease (CKD), stage III (moderate) (HCC) 08/27/2020    Chronic diastolic (congestive) heart failure (HCC) 08/27/2020    Nephrotic syndrome 04/25/2020    Peripheral edema 04/25/2020    Pulmonary edema 04/25/2020    Osteomyelitis of left foot [address] 04/24/2019    Pyogenic inflammation of bone ([facility])     History of medication noncompliance     Diabetic foot infection ([facility] 04/21/2019    Anemia 10/05/2013    Migraine headache 11/09/2011    Type 2 diabetes mellitus, with long-term current use of insulin ([facility])     Mixed hyperlipidemia         Assessment and Plan:     Acute hypoxic respiratory failure   ~ severely elevated proBNP on admit. LV function essentially normal. Mild diastolic dysfunction. ~ required intubation, now extubated and on RA   ~ Diuresing per nephrology     Elevated troponin   ~ could be from KINZA or respiratory failure   ~ plan for [facility] when medically optimized     Acute diastolic CHF   ~ Echo 2/79/00: EF 55-60%  ~ on IV lasix 40 TID per nephrology dosing in addition to HD/ UF  ~ down 51lbs since admit     DM  ~ longstanding    Hypertension   ~ elevated   ~ clonidine patch. Failed swallow eval, started on scheduled IV labetalol and PRN hydralazine. KINZA on CKD  ~ started on HD 8/31/21  ~ plan for tunneled cath today   ~ per nephrology      Multiple medical conditions with risk of decompensation. All pertinent information and plan of care discussed with the physician. All questions and concerns were addressed to the patient. Alternatives to my treatment were discussed. I have discussed the above stated plan with patient and the nurse. The patient verbalized understanding and agreed with the plan. Thank you for allowing to us to participate in the care of David Reeves. Total visit time > 35 minutes; > 50% spend counseling / coordinating care.  I reviewed interval history, physical exam, review of data including labs, imaging, development and implementation of treatment plan and coordination of complex care. Counseled on risk factor modifications. Hilary STAFFORD-CNP  [facility]   Office: [phone]    NOTE:  This report was transcribed using voice recognition software. Every effort was made to ensure accuracy; however, inadvertent computerized transcription errors may be present.

## 2021-09-08 LAB
A/G RATIO: 0.8 (ref 1.1–2.2)
ALBUMIN SERPL-MCNC: 2.7 G/DL (ref 3.4–5)
ALP BLD-CCNC: 101 U/L (ref 40–129)
ALT SERPL-CCNC: 10 U/L (ref 10–40)
ANION GAP SERPL CALCULATED.3IONS-SCNC: 15 MMOL/L (ref 3–16)
APTT: 32.2 SEC (ref 26.2–38.6)
AST SERPL-CCNC: 16 U/L (ref 15–37)
BASOPHILS ABSOLUTE: 0.1 K/UL (ref 0–0.2)
BASOPHILS RELATIVE PERCENT: 0.7 %
BILIRUB SERPL-MCNC: 0.3 MG/DL (ref 0–1)
BUN BLDV-MCNC: 37 MG/DL (ref 7–20)
CALCIUM IONIZED: 1.17 MMOL/L (ref 1.12–1.32)
CALCIUM SERPL-MCNC: 8.4 MG/DL (ref 8.3–10.6)
CHLORIDE BLD-SCNC: 109 MMOL/L (ref 99–110)
CO2: 16 MMOL/L (ref 21–32)
CREAT SERPL-MCNC: 4.6 MG/DL (ref 0.6–1.1)
EOSINOPHILS ABSOLUTE: 0.2 K/UL (ref 0–0.6)
EOSINOPHILS RELATIVE PERCENT: 1.3 %
GFR AFRICAN AMERICAN: 12
GFR NON-AFRICAN AMERICAN: 10
GLOBULIN: 3.2 G/DL
GLUCOSE BLD-MCNC: 115 MG/DL (ref 70–99)
GLUCOSE BLD-MCNC: 128 MG/DL (ref 70–99)
GLUCOSE BLD-MCNC: 131 MG/DL (ref 70–99)
GLUCOSE BLD-MCNC: 155 MG/DL (ref 70–99)
GLUCOSE BLD-MCNC: 158 MG/DL (ref 70–99)
GLUCOSE BLD-MCNC: 161 MG/DL (ref 70–99)
HCT VFR BLD CALC: 28.6 % (ref 36–48)
HEMOGLOBIN: 9.3 G/DL (ref 12–16)
LACTIC ACID: 0.6 MMOL/L (ref 0.4–2)
LEFT VENTRICULAR EJECTION FRACTION MODE: NORMAL
LV EF: 65 %
LYMPHOCYTES ABSOLUTE: 2.2 K/UL (ref 1–5.1)
LYMPHOCYTES RELATIVE PERCENT: 14.8 %
MAGNESIUM: 2.3 MG/DL (ref 1.8–2.4)
MCH RBC QN AUTO: 28.5 PG (ref 26–34)
MCHC RBC AUTO-ENTMCNC: 32.6 G/DL (ref 31–36)
MCV RBC AUTO: 87.2 FL (ref 80–100)
MONOCYTES ABSOLUTE: 1.4 K/UL (ref 0–1.3)
MONOCYTES RELATIVE PERCENT: 9.3 %
NEUTROPHILS ABSOLUTE: 11.1 K/UL (ref 1.7–7.7)
NEUTROPHILS RELATIVE PERCENT: 73.9 %
PDW BLD-RTO: 16.6 % (ref 12.4–15.4)
PERFORMED ON: ABNORMAL
PH VENOUS: 7.35 (ref 7.35–7.45)
PHOSPHORUS: 4.7 MG/DL (ref 2.5–4.9)
PLATELET # BLD: 295 K/UL (ref 135–450)
PMV BLD AUTO: 8.7 FL (ref 5–10.5)
POC ACT LR: 207 SEC
POTASSIUM SERPL-SCNC: 4 MMOL/L (ref 3.5–5.1)
RBC # BLD: 3.28 M/UL (ref 4–5.2)
SODIUM BLD-SCNC: 140 MMOL/L (ref 136–145)
TOTAL CK: 46 U/L (ref 26–192)
TOTAL PROTEIN: 5.9 G/DL (ref 6.4–8.2)
WBC # BLD: 15 K/UL (ref 4–11)

## 2021-09-08 PROCEDURE — 85347 COAGULATION TIME ACTIVATED: CPT

## 2021-09-08 PROCEDURE — C1769 GUIDE WIRE: HCPCS

## 2021-09-08 PROCEDURE — 2580000003 HC RX 258: Performed by: INTERNAL MEDICINE

## 2021-09-08 PROCEDURE — 6370000000 HC RX 637 (ALT 250 FOR IP): Performed by: INTERNAL MEDICINE

## 2021-09-08 PROCEDURE — 2500000003 HC RX 250 WO HCPCS: Performed by: INTERNAL MEDICINE

## 2021-09-08 PROCEDURE — 97530 THERAPEUTIC ACTIVITIES: CPT

## 2021-09-08 PROCEDURE — 83735 ASSAY OF MAGNESIUM: CPT

## 2021-09-08 PROCEDURE — 4A023N7 MEASUREMENT OF CARDIAC SAMPLING AND PRESSURE, LEFT HEART, PERCUTANEOUS APPROACH: ICD-10-PCS | Performed by: INTERNAL MEDICINE

## 2021-09-08 PROCEDURE — 83605 ASSAY OF LACTIC ACID: CPT

## 2021-09-08 PROCEDURE — 6360000002 HC RX W HCPCS: Performed by: INTERNAL MEDICINE

## 2021-09-08 PROCEDURE — 80053 COMPREHEN METABOLIC PANEL: CPT

## 2021-09-08 PROCEDURE — C9113 INJ PANTOPRAZOLE SODIUM, VIA: HCPCS | Performed by: INTERNAL MEDICINE

## 2021-09-08 PROCEDURE — 97166 OT EVAL MOD COMPLEX 45 MIN: CPT

## 2021-09-08 PROCEDURE — 85025 COMPLETE CBC W/AUTO DIFF WBC: CPT

## 2021-09-08 PROCEDURE — 85730 THROMBOPLASTIN TIME PARTIAL: CPT

## 2021-09-08 PROCEDURE — 90935 HEMODIALYSIS ONE EVALUATION: CPT

## 2021-09-08 PROCEDURE — C1887 CATHETER, GUIDING: HCPCS

## 2021-09-08 PROCEDURE — 93458 L HRT ARTERY/VENTRICLE ANGIO: CPT | Performed by: INTERNAL MEDICINE

## 2021-09-08 PROCEDURE — 6360000004 HC RX CONTRAST MEDICATION: Performed by: INTERNAL MEDICINE

## 2021-09-08 PROCEDURE — 99152 MOD SED SAME PHYS/QHP 5/>YRS: CPT | Performed by: INTERNAL MEDICINE

## 2021-09-08 PROCEDURE — 82550 ASSAY OF CK (CPK): CPT

## 2021-09-08 PROCEDURE — B2151ZZ FLUOROSCOPY OF LEFT HEART USING LOW OSMOLAR CONTRAST: ICD-10-PCS | Performed by: INTERNAL MEDICINE

## 2021-09-08 PROCEDURE — C1894 INTRO/SHEATH, NON-LASER: HCPCS

## 2021-09-08 PROCEDURE — 2500000003 HC RX 250 WO HCPCS

## 2021-09-08 PROCEDURE — 2000000000 HC ICU R&B

## 2021-09-08 PROCEDURE — 82330 ASSAY OF CALCIUM: CPT

## 2021-09-08 PROCEDURE — 2580000003 HC RX 258

## 2021-09-08 PROCEDURE — B2111ZZ FLUOROSCOPY OF MULTIPLE CORONARY ARTERIES USING LOW OSMOLAR CONTRAST: ICD-10-PCS | Performed by: INTERNAL MEDICINE

## 2021-09-08 PROCEDURE — 6360000002 HC RX W HCPCS

## 2021-09-08 PROCEDURE — 2709999900 HC NON-CHARGEABLE SUPPLY

## 2021-09-08 PROCEDURE — 93458 L HRT ARTERY/VENTRICLE ANGIO: CPT

## 2021-09-08 PROCEDURE — 99152 MOD SED SAME PHYS/QHP 5/>YRS: CPT

## 2021-09-08 PROCEDURE — 99153 MOD SED SAME PHYS/QHP EA: CPT

## 2021-09-08 PROCEDURE — 93571 IV DOP VEL&/PRESS C FLO 1ST: CPT

## 2021-09-08 PROCEDURE — 93571 IV DOP VEL&/PRESS C FLO 1ST: CPT | Performed by: INTERNAL MEDICINE

## 2021-09-08 PROCEDURE — 84100 ASSAY OF PHOSPHORUS: CPT

## 2021-09-08 PROCEDURE — 97162 PT EVAL MOD COMPLEX 30 MIN: CPT

## 2021-09-08 RX ORDER — OXYCODONE HYDROCHLORIDE AND ACETAMINOPHEN 5; 325 MG/1; MG/1
1 TABLET ORAL EVERY 4 HOURS PRN
Status: DISCONTINUED | OUTPATIENT
Start: 2021-09-08 | End: 2021-09-13 | Stop reason: HOSPADM

## 2021-09-08 RX ORDER — PROPRANOLOL HYDROCHLORIDE 80 MG/1
80 CAPSULE, EXTENDED RELEASE ORAL EVERY MORNING
Status: DISCONTINUED | OUTPATIENT
Start: 2021-09-09 | End: 2021-09-13 | Stop reason: HOSPADM

## 2021-09-08 RX ORDER — HEPARIN SODIUM 1000 [USP'U]/ML
3600 INJECTION, SOLUTION INTRAVENOUS; SUBCUTANEOUS PRN
Status: DISCONTINUED | OUTPATIENT
Start: 2021-09-08 | End: 2021-09-13 | Stop reason: HOSPADM

## 2021-09-08 RX ORDER — AMLODIPINE BESYLATE 5 MG/1
10 TABLET ORAL DAILY
Status: DISCONTINUED | OUTPATIENT
Start: 2021-09-08 | End: 2021-09-13 | Stop reason: HOSPADM

## 2021-09-08 RX ORDER — SODIUM CHLORIDE 9 MG/ML
25 INJECTION, SOLUTION INTRAVENOUS PRN
Status: DISCONTINUED | OUTPATIENT
Start: 2021-09-08 | End: 2021-09-13 | Stop reason: HOSPADM

## 2021-09-08 RX ORDER — ACETAMINOPHEN 325 MG/1
650 TABLET ORAL EVERY 4 HOURS PRN
Status: DISCONTINUED | OUTPATIENT
Start: 2021-09-08 | End: 2021-09-13 | Stop reason: HOSPADM

## 2021-09-08 RX ORDER — OXYCODONE HYDROCHLORIDE AND ACETAMINOPHEN 5; 325 MG/1; MG/1
2 TABLET ORAL EVERY 4 HOURS PRN
Status: DISCONTINUED | OUTPATIENT
Start: 2021-09-08 | End: 2021-09-13 | Stop reason: HOSPADM

## 2021-09-08 RX ORDER — ATORVASTATIN CALCIUM 40 MG/1
40 TABLET, FILM COATED ORAL NIGHTLY
Status: DISCONTINUED | OUTPATIENT
Start: 2021-09-08 | End: 2021-09-13 | Stop reason: HOSPADM

## 2021-09-08 RX ORDER — FUROSEMIDE 40 MG/1
40 TABLET ORAL DAILY
Status: DISCONTINUED | OUTPATIENT
Start: 2021-09-09 | End: 2021-09-13 | Stop reason: HOSPADM

## 2021-09-08 RX ORDER — SODIUM CHLORIDE 0.9 % (FLUSH) 0.9 %
5-40 SYRINGE (ML) INJECTION EVERY 12 HOURS SCHEDULED
Status: DISCONTINUED | OUTPATIENT
Start: 2021-09-08 | End: 2021-09-13 | Stop reason: HOSPADM

## 2021-09-08 RX ORDER — SODIUM CHLORIDE 0.9 % (FLUSH) 0.9 %
5-40 SYRINGE (ML) INJECTION PRN
Status: DISCONTINUED | OUTPATIENT
Start: 2021-09-08 | End: 2021-09-13 | Stop reason: HOSPADM

## 2021-09-08 RX ADMIN — AMLODIPINE BESYLATE 10 MG: 5 TABLET ORAL at 20:41

## 2021-09-08 RX ADMIN — INSULIN LISPRO 1 UNITS: 100 INJECTION, SOLUTION INTRAVENOUS; SUBCUTANEOUS at 20:45

## 2021-09-08 RX ADMIN — PANTOPRAZOLE SODIUM 40 MG: 40 INJECTION, POWDER, FOR SOLUTION INTRAVENOUS at 10:55

## 2021-09-08 RX ADMIN — LABETALOL HYDROCHLORIDE 20 MG: 5 INJECTION INTRAVENOUS at 04:02

## 2021-09-08 RX ADMIN — LABETALOL HYDROCHLORIDE 20 MG: 5 INJECTION INTRAVENOUS at 15:39

## 2021-09-08 RX ADMIN — HYDRALAZINE HYDROCHLORIDE 20 MG: 20 INJECTION INTRAMUSCULAR; INTRAVENOUS at 23:35

## 2021-09-08 RX ADMIN — INSULIN LISPRO 1 UNITS: 100 INJECTION, SOLUTION INTRAVENOUS; SUBCUTANEOUS at 04:02

## 2021-09-08 RX ADMIN — Medication 10 ML: at 23:00

## 2021-09-08 RX ADMIN — FUROSEMIDE 80 MG: 40 TABLET ORAL at 10:54

## 2021-09-08 RX ADMIN — HEPARIN SODIUM 5000 UNITS: 5000 INJECTION INTRAVENOUS; SUBCUTANEOUS at 05:51

## 2021-09-08 RX ADMIN — ATORVASTATIN CALCIUM 40 MG: 40 TABLET, FILM COATED ORAL at 20:52

## 2021-09-08 RX ADMIN — Medication 10 ML: at 10:54

## 2021-09-08 RX ADMIN — Medication 10 ML: at 20:48

## 2021-09-08 RX ADMIN — IOPAMIDOL 69 ML: 755 INJECTION, SOLUTION INTRAVENOUS at 15:34

## 2021-09-08 RX ADMIN — ERYTHROPOIETIN 10000 UNITS: 20000 INJECTION, SOLUTION INTRAVENOUS; SUBCUTANEOUS at 08:34

## 2021-09-08 RX ADMIN — HEPARIN SODIUM 3600 UNITS: 1000 INJECTION INTRAVENOUS; SUBCUTANEOUS at 10:00

## 2021-09-08 RX ADMIN — PANTOPRAZOLE SODIUM 40 MG: 40 INJECTION, POWDER, FOR SOLUTION INTRAVENOUS at 20:49

## 2021-09-08 RX ADMIN — ERYTHROPOIETIN 10000 UNITS: 20000 INJECTION, SOLUTION INTRAVENOUS; SUBCUTANEOUS at 09:00

## 2021-09-08 RX ADMIN — HEPARIN SODIUM 5000 UNITS: 5000 INJECTION INTRAVENOUS; SUBCUTANEOUS at 22:00

## 2021-09-08 RX ADMIN — LABETALOL HYDROCHLORIDE 20 MG: 5 INJECTION INTRAVENOUS at 10:58

## 2021-09-08 ASSESSMENT — PAIN SCALES - GENERAL
PAINLEVEL_OUTOF10: 0
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
## 2021-09-08 NOTE — PROGRESS NOTES
MD Lyudmila Devries MD Sixto Burows, MD               Office: [phone]                      Fax: [phone]             [address] [place] [address]                   NEPHROLOGY CVU INPATIENT PROGRESS NOTE:     PATIENT NAME: Edyta Wagoner  : 1975  MRN: [id number]     Nephrology treatment plan update. Patient extubated. More awake and alert. Going to have hemodialysis treatment today  TDC placed yesterday  Outpt placement at [facility] pending  - HD today, 2.L removed. Post HD weight today will be set as EDW. - switched to po Lasix, decrease to 40mg daily. Now appearing euvolemic.    - RIA IV with HD MWF  - follow outpt HD placement    -Patient has advanced background of chronic kidney disease stage IV from diabetic nephropathy.  -Follows with me.   -Likely progression of kidney disease. Medications reviewed. Hold Ace inhibitors    Anemia-hgb better. RIA with HD. Poor nutritional status.  -Albumin is 2.5. Discussed with RN. RECOMMENDATIONS:   HD today  Outpt HD placement pending  Switched to po Lasix. D/C plan from renal stand point:  Once outpt HD unit available. IMPRESSION:       Admitted for:  Acute respiratory failure ([address] [J96.00]  Encephalopathy [G93.40]  Respiratory failure with hypoxia, unspecified chronicity ([address] [J96.91]  Pneumonia due to infectious organism, unspecified laterality, unspecified part of lung [J18.9]      KINZA (on proteinuric CKD: 4): Worsening.   Started on HD.   - BL Scr-last known 2.5, in 2021,   ---> 4.6 on admission  -: Etiology of KINZA -presumed ATN in the setting of pneumonia, unclear if it this is advancing CKD    -appreciated assistance by Dr Sherren Prayer for Rt IJ non-TDC placement ()   -Hemodialysis #1 2021      History of nephrotic range proteinuria,   - thought to be from diabetic nephropathy With CKD stage III -> so high risk of advanced CKD,  -Follows with me  -Noncompliance, last follow-up was 2/2021 then lost for follow-up  -echo results -  IVC size is dilated (>2.1 cm) but collapses > 50% with respiration consistent with elevated RA pressure (8 mmHg). -Last echo-8/2020  moderate concentric LVH, normal size and function with EF of 65%, normal diastolic function      Associated problems:   Hypokalemia-needing repletion. Better. Acidosis, non-anion gap-HD today  Hypomagnesemia-better  Hypophosphatemia-better  Anemia, chronic disease-RIA with HD      Other major problems: Management per primary and other consulting teams.   //Acute hypoxic respiratory failure -needing intubation. Extubated  // Multifocal airspace disease that likely represents a combination of aspiration/pneumonia and pulmonary edema  //Fluid overload  -COVID was ruled out  //History of uncontrolled diabetes last A1c was 11.3    // Hypertension un-controlled,       Hospital Problems         Last Modified POA    Pulmonary edema 8/28/2021 Yes    Chronic kidney disease (CKD), stage III (moderate) (HCC) 8/28/2021 Yes    Chronic diastolic (congestive) heart failure ([address] 8/28/2021 Yes    Acute on chronic diastolic heart failure ([address] 8/28/2021 Yes    Acute respiratory failure ([facility] 8/27/2021 Yes           Thank you for allowing me to participate in this patient's care. Please do not hesitate to contact me anytime. We will follow along with you. Vikash Ballard MD,  [facility] [address] [place]: [phone] or [address]  Fax: [phone]     Severally ill, at risk of impending organ failure needing ICU higher level of care/monitoring. Time spent  35 minutes that included face-to-face meeting/discussion with patient's treatment team (including primary/referring team and other consultants; included coordination of care with the treatment team; and review of patient's electronic medical records and ordering appropriates tests. ========================================================   ========================================================   Subjective:   No complaints today  Neg 18L since admission  Lower urine output. HD today, tolerated well    Past medical, Surgical, Social, Family medical history reviewed by me. MEDICATIONS: reviewed by me. Medications Prior to Admission:  No current facility-administered medications on file prior to encounter. Current Outpatient Medications on File Prior to Encounter   Medication Sig Dispense Refill    Dulaglutide 0.75 MG/0.5ML SOPN Inject 0.75 mg into the skin once a week 4 pen 1    ibuprofen (ADVIL;MOTRIN) 200 MG CAPS Take 1 capsule by mouth      furosemide (LASIX) 80 MG tablet Take 80 mg by mouth every morning 80mg in the morning 40mg in the evening      amLODIPine (NORVASC) 10 MG tablet Take 1 tablet by mouth daily 30 tablet 1    furosemide (LASIX) 40 MG tablet Take 1 tablet by mouth every evening 30 tablet 1    spironolactone (ALDACTONE) 50 MG tablet Take 1 tablet by mouth daily 30 tablet 2    insulin glargine (LANTUS;BASAGLAR) 100 UNIT/ML injection pen Inject 30 Units into the skin daily 4 pen 2    lisinopril (PRINIVIL;ZESTRIL) 40 MG tablet Take 1 tablet by mouth daily 30 tablet 2    atorvastatin (LIPITOR) 40 MG tablet Take 1 tablet by mouth nightly 30 tablet 3    Insulin Pen Needle 29G X 12.7MM MISC 1 each by Does not apply route daily 100 each 5    propranolol (INDERAL LA) 80 MG extended release capsule Take 1 capsule by mouth every morning 30 capsule 2    LORazepam (ATIVAN) 0.5 MG tablet Take 0.5 mg by mouth 2 times daily as needed for Anxiety.  aspirin 81 MG EC tablet Take 1 tablet by mouth daily 30 tablet 3    pregabalin (LYRICA) 75 MG capsule Take 1 capsule by mouth nightly for 7 days.  7 capsule 0    sertraline (ZOLOFT) 50 MG tablet Take 1 tablet by mouth daily 30 tablet 3    glucose monitoring kit (FREESTYLE) monitoring kit 1 kit by Does not apply route daily 1 kit 0    insulin lispro, 1 Unit Dial, 100 UNIT/ML SOPN Inject 0-6 Units into the skin 3 times daily (with meals) **Corrective Low Dose Algorithm**  Glucose: Dose:               No Insulin  140-199 1 Unit  200-249 2 Units  250-299 3 Units  300-349 4 Units  350-399 5 Units  Over 399 6 Units (Patient taking differently: Inject 6-12 Units into the skin 3 times daily (with meals) **Corrective Low Dose Algorithm**  Glucose: Dose:               No Insulin  140-199 1 Unit  200-249 2 Units  250-299 3 Units  300-349 4 Units  350-399 5 Units  Over 399 6 Units) 3 pen 0    glucose blood VI test strips (VICTORY AGM-4000 TEST) strip Test four times daily until controlled and then three times daily.   Code 250.02  ONE TOUCH ULTRA MONITOR 100 strip 12         Current Facility-Administered Medications:     heparin (porcine) injection 3,600 Units, 3,600 Units, IntraCATHeter, PRN, Dillon Hooper MD, 3,600 Units at 09/08/21 1000    furosemide (LASIX) tablet 80 mg, 80 mg, Oral, Daily, Dillon Hooper MD, 80 mg at 09/08/21 1054    epoetin yohana (EPOGEN;PROCRIT) injection 10,000 Units, 10,000 Units, IntraVENous, Q MWF, Miles Kramer MD, 10,000 Units at 09/08/21 0834    labetalol (NORMODYNE;TRANDATE) injection 20 mg, 20 mg, IntraVENous, Q6H, Mayra Ocasio MD, 20 mg at 09/08/21 1058    hydrALAZINE (APRESOLINE) injection 20 mg, 20 mg, IntraVENous, Q4H PRN, Mayra Ocasio MD, 20 mg at 09/07/21 1458    0.9 % sodium chloride infusion, , IntraVENous, PRN, Jack Nino MD    sodium chloride flush 0.9 % injection 5-40 mL, 5-40 mL, IntraVENous, Q12H, Jack Nino MD, 10 mL at 09/08/21 1054    sodium chloride flush 0.9 % injection 5-40 mL, 5-40 mL, IntraVENous, PRN, Jack Nino MD    fluticasone (FLONASE) 50 MCG/ACT nasal spray 1 spray, 1 spray, Each Nostril, PRN, Serene Lozano MD    LORazepam (ATIVAN) injection 0.5 mg, 0.5 mg, IntraVENous, Q4H PRN, Shantelle Sanches MD, EXAM:  Recent vital signs and recent I/Os reviewed by me. Wt Readings from Last 3 Encounters:   09/08/21 171 lb 15.3 oz (78 kg)   07/08/21 244 lb 11.2 oz (111 kg)   02/26/21 237 lb 3.2 oz (107.6 kg)     BP Readings from Last 3 Encounters:   09/08/21 (!) 157/60   07/08/21 (!) 160/100   02/26/21 133/86     Patient Vitals for the past 24 hrs:   BP Temp Temp src Pulse Resp SpO2 Weight   09/08/21 0943 (!) 157/60 97.3 °F (36.3 °C) -- 102 16 -- 171 lb 15.3 oz (78 kg)   09/08/21 0800 (!) 151/69 97.2 °F (36.2 °C) Temporal 105 15 99 % --   09/08/21 0642 (!) 164/64 97.6 °F (36.4 °C) -- 92 17 -- 176 lb 12.9 oz (80.2 kg)   09/08/21 0400 (!) 150/65 98.2 °F (36.8 °C) Temporal 96 16 95 % 172 lb 6.4 oz (78.2 kg)   09/08/21 0009 (!) 157/62 97.1 °F (36.2 °C) Temporal 94 16 95 % --   09/07/21 2000 (!) 168/66 98.9 °F (37.2 °C) Temporal 104 17 94 % --   09/07/21 1805 (!) 155/60 -- -- 110 -- -- --   09/07/21 1600 (!) 155/60 97 °F (36.1 °C) Temporal 103 14 94 % --   09/07/21 1458 (!) 189/72 -- -- -- -- -- --   09/07/21 1439 (!) 201/88 -- -- 100 14 92 % --   09/07/21 1436 (!) 181/86 -- -- 96 14 94 % --   09/07/21 1432 (!) 205/89 -- -- 96 18 96 % --   09/07/21 1428 (!) 197/93 -- -- 95 18 -- --   09/07/21 1200 (!) 195/79 97.7 °F (36.5 °C) Temporal 99 16 98 % --       Intake/Output Summary (Last 24 hours) at 9/8/2021 1110  Last data filed at 9/8/2021 1054  Gross per 24 hour   Intake 750 ml   Output 3075 ml   Net -2325 ml          Constitutional: Poorly responsive, Intubated and  obese habitus  Eyes: Conjunctiva clear and Noscleral icterus  Ear, Nose, and Throat: moist oral mucosa; Neck: Trachea midline,  No jugular venous distension  Cardiovascular: Regular rate and ryhthm, normal S1 and S2,    Respiratory: clear  Abdomen:  distended. Normal bowel sounds. : Meza catheter is inserted, draining urine  Skin: no rash,  bruises on visible body area  Musculoskeletal: No clubbing or cyanosis of digits. and Normocephalic.   Extremitties: no peripheral edema, no deformities. Neurologic:Unable to obtain as intubated/sedated. Psychiatric: Unable to obtain as intubated/sedated. DATA:  Diagnostic tests reviewed by me for today's visit:   (AS NEEDED FOR MY EVALUATION AND MANAGEMENT). Recent Labs     09/06/21 0403 09/07/21 0335 09/08/21  0353   WBC 19.6* 15.8* 15.0*   HCT 22.3* 27.6* 28.6*    290 295     Iron Saturation:  No components found for: PERCENTFE  FERRITIN:    Lab Results   Component Value Date    FERRITIN 112.0 08/28/2021     IRON:    Lab Results   Component Value Date    IRON 22 08/28/2021     TIBC:    Lab Results   Component Value Date    TIBC 184 08/28/2021       Recent Labs     09/06/21 0403 09/07/21 0335 09/08/21 0353    139 140   K 3.8 3.9 4.0    108 109   CO2 14* 16* 16*   BUN 40* 26* 37*   CREATININE 4.3* 3.4* 4.6*     Recent Labs     09/06/21 0403 09/07/21 0335 09/08/21  0353   CALCIUM 8.7 8.5 8.4   MG 2.30 2.00 2.30   PHOS 5.0* 3.2 4.7     No results for input(s): PH, PCO2, PO2 in the last 72 hours.     Invalid input(s): Yessi Cintron    ABG:  No results found for: PH, PCO2, PO2, HCO3, BE, THGB, TCO2, O2SAT  VBG:    Lab Results   Component Value Date    PHVEN 7.353 09/08/2021    FTB5UXH 34.3 02/23/2021    BEVEN -4.6 02/23/2021    H1TOFUXF 100 02/23/2021       LDH:  No results found for: LDH  Uric Acid:  No results found for: LABURIC, URICACID    PT/INR:    Lab Results   Component Value Date    PROTIME 11.3 09/07/2021    INR 1.00 09/07/2021     Warfarin PT/INR:  No components found for: PTPATWAR, PTINRWAR  PTT:    Lab Results   Component Value Date    APTT 32.2 09/08/2021   [APTT}  Last 3 Troponin:    Lab Results   Component Value Date    TROPONINI 0.23 08/28/2021    TROPONINI 0.22 08/27/2021    TROPONINI 0.24 08/27/2021       U/A:    Lab Results   Component Value Date    COLORU YELLOW 08/27/2021    PROTEINU >300 08/27/2021    PHUR 6.0 08/28/2021    WBCUA 7 08/27/2021    RBCUA 3 08/27/2021    CLARITYU Clear 08/27/2021    SPECGRAV 1.013 08/27/2021    LEUKOCYTESUR Negative 08/27/2021    UROBILINOGEN 0.2 08/27/2021    BILIRUBINUR Negative 08/27/2021    BLOODU SMALL 08/27/2021    GLUCOSEU 250 08/27/2021     Microalbumen/Creatinine ratio:  No components found for: RUCREAT  24 Hour Urine for Protein:  No components found for: RAWUPRO, UHRS3, BPFF99PL, UTV3  24 Hour Urine for Creatinine Clearance:  No components found for: CREAT4, UHRS10, UTV10  Urine Toxicology:  No components found for: Perla Joselo, IBENZO, ICOCAINE, IMARTHC, IOPIATES, IPHENCYC    HgBA1c:    Lab Results   Component Value Date    LABA1C 5.7 08/27/2021     RPR:  No results found for: RPR  HIV:  No results found for: HIV  NILSA:    Lab Results   Component Value Date    NILSA Negative 04/25/2020     RF:  No results found for: RF  DSDNA:  No components found for: DNA  AMYLASE:  No results found for: AMYLASE  LIPASE:    Lab Results   Component Value Date    LIPASE 14.0 04/25/2020     Fibrinogen Level:  No components found for: FIB       BELOW MENTIONED RADIOLOGY STUDY RESULTS BY ME (AS NEEDED FOR MY EVALUATION AND MANAGEMENT). CT HEAD WO CONTRAST    Result Date: 8/27/2021  EXAMINATION: CT OF THE HEAD WITHOUT CONTRAST; CT OF THE CHEST WITHOUT CONTRAST  8/27/2021 7:12 pm TECHNIQUE: CT of the head was performed without the administration of intravenous contrast. Dose modulation, iterative reconstruction, and/or weight based adjustment of the mA/kV was utilized to reduce the radiation dose to as low as reasonably achievable.; CT of the chest was performed without the administration of intravenous contrast. Multiplanar reformatted images are provided for review. Dose modulation, iterative reconstruction, and/or weight based adjustment of the mA/kV was utilized to reduce the radiation dose to as low as reasonably achievable. COMPARISON: CT head 08/27/2020.  HISTORY: ORDERING SYSTEM PROVIDED HISTORY: [facility] TECHNOLOGIST PROVIDED HISTORY: Has a \"code stroke\" or \"stroke alert\" been called? ->No Reason for exam:->AMS Decision Support Exception - unselect if not a suspected or confirmed emergency medical condition->Emergency Medical Condition ([place]) Is the patient pregnant?->No Reason for Exam: Respiratory Distress (Pt brought in [facility] after call from  for resp distress. O2 sat 70's, BMV in route. Pt will open eyes to voice. Pupils 8mm and fixed. ) Acuity: Acute Type of Exam: Initial; ORDERING SYSTEM PROVIDED HISTORY: AMS, respiratory distress TECHNOLOGIST PROVIDED HISTORY: Reason for exam:->AMS, respiratory distress Is the patient pregnant?->No Reason for Exam: Respiratory Distress (Pt brought in [facility] after call from  for resp distress. O2 sat 70's, BMV in route. Pt will open eyes to voice. Pupils 8mm and fixed. ) Acuity: Acute Type of Exam: Initial CT HEAD FINDINGS: BRAIN/VENTRICLES: No acute hemorrhage. Periventricular and subcortical hypoattenuation is nonspecific. Interval chronic lacunar infarcts in the left corona radiata, thalamus, and internal capsule. Artifact partially obscures the laureano. Artifact partially obscures the inferior cerebellum. Rozelle Ramp white differentiation appears maintained given artifact near the skull base and through the posterior fossa. Mild prominence of the ventricles noted. Overall ventricle size appears increased from prior exam.  There is no midline shift. Basal cisterns appear patent. ORBITS: Visualized orbits appear unremarkable on this unenhanced exam. SINUSES: Mild mucosal thickening of the ethmoid and sphenoid sinuses. Visualized mastoid air cells appear clear. SOFT TISSUES/SKULL: No depressed calvarial fracture. Fluid in the nasopharynx and oropharynx. CT HEAD IMPRESSION: No acute hemorrhage or midline shift. Increased ventricle size compared to prior exam.  Correlate with presentation to exclude acute hydrocephalus. Other findings as described.  CT CHEST FINDINGS: Mediastinum: Heart is borderline enlarged. Trace pericardial effusion or thickening. Aorta is within normal limits in size. Pulmonary arteries are within normal limits in size. . Lungs/pleura: Central airways are patent. Endotracheal tube with tip terminating in the distal 3rd subglottic trachea. Secretions noted in the trachea. Opacification of segmental and subsegmental bronchi. Ground-glass the confluent airspace disease. Interlobular septal thickening. Small to moderate pleural effusions. No pneumothorax. Soft Tissues/Bones: Suboptimal evaluation for adenopathy without intravenous contrast. Mediastinal adenopathy. Diffuse body wall edema. Scattered degenerative changes noted in the visualized spine without spondylolisthesis. Upper Abdomen: Limited images of the upper abdomen are unremarkable given lack of intravenous contrast. CT CHEST IMPRESSION: 1.   1. Multifocal airspace disease that likely represents a combination of aspiration/pneumonia and pulmonary edema. 2. Other findings as described. CT CHEST WO CONTRAST    Result Date: 8/27/2021  EXAMINATION: CT OF THE HEAD WITHOUT CONTRAST; CT OF THE CHEST WITHOUT CONTRAST  8/27/2021 7:12 pm TECHNIQUE: CT of the head was performed without the administration of intravenous contrast. Dose modulation, iterative reconstruction, and/or weight based adjustment of the mA/kV was utilized to reduce the radiation dose to as low as reasonably achievable.; CT of the chest was performed without the administration of intravenous contrast. Multiplanar reformatted images are provided for review. Dose modulation, iterative reconstruction, and/or weight based adjustment of the mA/kV was utilized to reduce the radiation dose to as low as reasonably achievable. COMPARISON: CT head 08/27/2020. HISTORY: ORDERING SYSTEM PROVIDED HISTORY: AMS TECHNOLOGIST PROVIDED HISTORY: Has a \"code stroke\" or \"stroke alert\" been called? ->No Reason for exam:->AMS Decision Support Exception - unselect if not a suspected or confirmed emergency medical condition->Emergency Medical Condition ([place]) Is the patient pregnant?->No Reason for Exam: Respiratory Distress (Pt brought in per [facility] after call from  for resp distress. O2 sat 70's, BMV in route. Pt will open eyes to voice. Pupils 8mm and fixed. ) Acuity: Acute Type of Exam: Initial; ORDERING SYSTEM PROVIDED HISTORY: AMS, respiratory distress TECHNOLOGIST PROVIDED HISTORY: Reason for exam:->AMS, respiratory distress Is the patient pregnant?->No Reason for Exam: Respiratory Distress (Pt brought in per [facility] after call from  for resp distress. O2 sat 70's, BMV in route. Pt will open eyes to voice. Pupils 8mm and fixed. ) Acuity: Acute Type of Exam: Initial CT HEAD FINDINGS: BRAIN/VENTRICLES: No acute hemorrhage. Periventricular and subcortical hypoattenuation is nonspecific. Interval chronic lacunar infarcts in the left corona radiata, thalamus, and internal capsule. Artifact partially obscures the laureano. Artifact partially obscures the inferior cerebellum. Henrene Miss white differentiation appears maintained given artifact near the skull base and through the posterior fossa. Mild prominence of the ventricles noted. Overall ventricle size appears increased from prior exam.  There is no midline shift. Basal cisterns appear patent. ORBITS: Visualized orbits appear unremarkable on this unenhanced exam. SINUSES: Mild mucosal thickening of the ethmoid and sphenoid sinuses. Visualized mastoid air cells appear clear. SOFT TISSUES/SKULL: No depressed calvarial fracture. Fluid in the nasopharynx and oropharynx. CT HEAD IMPRESSION: No acute hemorrhage or midline shift. Increased ventricle size compared to prior exam.  Correlate with presentation to exclude acute hydrocephalus. Other findings as described. CT CHEST FINDINGS: Mediastinum: Heart is borderline enlarged. Trace pericardial effusion or thickening.  Aorta is within normal limits in size. Pulmonary arteries are within normal limits in size. . Lungs/pleura: Central airways are patent. Endotracheal tube with tip terminating in the distal 3rd subglottic trachea. Secretions noted in the trachea. Opacification of segmental and subsegmental bronchi. Ground-glass the confluent airspace disease. Interlobular septal thickening. Small to moderate pleural effusions. No pneumothorax. Soft Tissues/Bones: Suboptimal evaluation for adenopathy without intravenous contrast. Mediastinal adenopathy. Diffuse body wall edema. Scattered degenerative changes noted in the visualized spine without spondylolisthesis. Upper Abdomen: Limited images of the upper abdomen are unremarkable given lack of intravenous contrast. CT CHEST IMPRESSION: 1.   1. Multifocal airspace disease that likely represents a combination of aspiration/pneumonia and pulmonary edema. 2. Other findings as described. XR CHEST PORTABLE    Result Date: 8/28/2021  EXAMINATION: ONE XRAY VIEW OF THE CHEST 8/28/2021 1:21 am COMPARISON: Chest x-ray dated 02/24/2021. Lc Velasquez HISTORY: ORDERING SYSTEM PROVIDED HISTORY: central line and OG placement TECHNOLOGIST PROVIDED HISTORY: Reason for exam:->central line and OG placement FINDINGS: LINES/TUBES/OTHER: Endotracheal tube is noted with its tip approximately 5 cm from the ana. Enteric tube is noted coursing below the level of the diaphragm and within the stomach. Left IJ central venous catheter is noted with its tip projecting over the area of the left brachiocephalic vein. HEART/MEDIASTINUM: The cardiac silhouette is mildly enlarged, but stable. PLEURA/LUNGS: There is perihilar fullness and increased interstitial markings which may reflect pulmonary vascular congestion in the correct clinical setting. There is left basilar reflecting airspace disease, atelectasis or pleural effusion. There is right basilar airspace disease. There is no appreciable pneumothorax.  BONES/SOFT TISSUE: No acute abnormality. Endotracheal tube and enteric tube are in satisfactory position. The left IJ central venous catheter tip projects over the area of the left brachiocephalic vein. Right basilar airspace disease. Left basilar opacification reflecting airspace disease, atelectasis or pleural effusion. Conclusions      Summary   *Left ventricle - moderate concentric LVH, normal size and function with EF   of 55%   *Mitral valve - mild regurgitation   *Tricuspid valve - trivial regurgitation   *Bubble study - negative      Signature      ------------------------------------------------------------------   Electronically signed by Mcneil Klinefelter, MD (Interpreting   physician) on 08/31/2020 at 02:48 PM   ------------------------------------------------------------------         Summary   Left ventricular systolic function is normal with ejection fraction   estimated at 55-60 %. No regional wall motion abnormalities are noted. There is mild left ventricular hypertrophy. Normal left ventricular diastolic filling pressure. Moderate mitral regurgitation. Mild pulmonic regurgitation present. IVC size is dilated (>2.1 cm) but collapses > 50% with respiration   consistent with elevated RA pressure (8 mmHg). **There is a small-moderate bilateral pleural effusion. **There is a small circumferential pericardial effusion noted.       Previous echo done 2020 - EF 55%      Signature      ------------------------------------------------------------------   Electronically signed by Gilford Hoover MD, Marjory Reding   (Interpreting physician) on 08/30/2021 at 04:21 PM   ------------------------------------------------------------------

## 2021-09-08 NOTE — PRE SEDATION
Brief Pre-Op Note/Sedation Assessment      [facility]  1975  [id number]      [id number]  2:18 PM    Planned Procedure: Cardiac Catheterization Procedure    Post Procedure Plan: Return to same level of care    Consent: I have discussed with the patient and/or the patient representative the indication, alternatives, and the possible risks and/or complications of the planned procedure and the anesthesia methods. The patient and/or patient representative appear to understand and agree to proceed. Chief Complaint: NSTEMI  Dyspnea      Indications for Cath Procedure:  ACS > 24 hrs, Suspected CAD and LV Dysfunction  Anginal Classification within 2 weeks:  No symptoms  NYHA Heart Failure Class within 2 weeks: Class IV - Symptoms of HF at rest, Newly Diagnosed? Yes, Heart Failure Type: Diastolic  Is Cath Lab Visit Valve-related?: No  Surgical Risk: High Risk: Vascular  Functional Type: < 4 METS    Anti- Anginal Meds within 2 weeks:   Yes: Aspirin    Stress or Imaging Studies Performed:  None     Vital Signs:  BP (!) 141/63   Pulse 97   Temp 97.6 °F (36.4 °C) (Temporal)   Resp 18   Ht 5' 6\" (1.676 m)   Wt 171 lb 15.3 oz (78 kg)   SpO2 99%   BMI 27.75 kg/m²     Allergies: Allergies   Allergen Reactions    Amoxicillin Itching and Hives     Tolerates cephalosporins  Patient tolerating cefazolin (ANCEF) as of October 11, 2018  Patient tolerating cefazolin (ANCEF) as of October 11, 2018  Tolerates cephalosporins  Patient tolerating cefazolin (ANCEF) as of October 11, 2018    Levofloxacin Anaphylaxis    Vancomycin Shortness Of Breath, Hives and Anaphylaxis    Tape [Adhesive Tape] Other (See Comments)     Paper tape turns skin bright red. Plastic tape okay.         Past Medical History:  Past Medical History:   Diagnosis Date    Blind in both eyes     Cerebral artery occlusion with cerebral infarction ([address]     CHF (congestive heart failure) (HCC)     Chronic kidney disease     Depression     Diabetes mellitus out of [facility]     Diabetes mellitus, type II ([facility] [address]     2005    Diabetic neuropathy associated with type 2 diabetes mellitus ([facility] [address]     Generalized headaches     Hypertension     Infertility     Insomnia     chronic vs lack of time spent to sleep    Migraine headache 11/09/2011    Mixed hyperlipidemia     Otitis media     h/o recurrent    Pelvic abscess in female 10/05/2013    Pneumonia     2004 approx.     Stroke [facility]) 08/27/2020         Surgical History:  Past Surgical History:   Procedure Laterality Date    CERVIX SURGERY      laser tx for dysplasia;1992    EYE SURGERY      FOOT SURGERY Right     FOOT SURGERY Bilateral     FOOT SURGERY Left     IR TUNNELED CATHETER PLACEMENT GREATER THAN 5 YEARS  9/7/2021    IR TUNNELED CATHETER PLACEMENT GREATER THAN 5 YEARS 9/7/2021 Tsering Hancock MD [facility] SPECIAL PROCEDURES         Medications:  Current Facility-Administered Medications   Medication Dose Route Frequency Provider Last Rate Last Admin    heparin (porcine) injection 3,600 Units  3,600 Units IntraCATHeter PRN Chidi Stephens MD   3,600 Units at 09/08/21 1000    [START ON 9/9/2021] furosemide (LASIX) tablet 40 mg  40 mg Oral Daily Dillon Batista MD        epoetin yohana (EPOGEN;PROCRIT) injection 10,000 Units  10,000 Units IntraVENous Q MWF Dillon Batista MD   10,000 Units at 09/08/21 0900    labetalol (NORMODYNE;TRANDATE) injection 20 mg  20 mg IntraVENous Q6H Roxanne Guzman MD   20 mg at 09/08/21 1058    hydrALAZINE (APRESOLINE) injection 20 mg  20 mg IntraVENous Q4H PRN Roxanne Guzman MD   20 mg at 09/07/21 1458    0.9 % sodium chloride infusion   IntraVENous PRN Jack Nino MD        sodium chloride flush 0.9 % injection 5-40 mL  5-40 mL IntraVENous Q12H Jack Nino MD   10 mL at 09/08/21 1054    sodium chloride flush 0.9 % injection 5-40 mL  5-40 mL IntraVENous PRN Dari Parker MD        fluticasone (FLONASE) 50 MCG/ACT nasal spray 1 spray  1 spray Each Nostril PRN Trupti Macias MD        LORazepam (ATIVAN) injection 0.5 mg  0.5 mg IntraVENous Q4H PRN Tim Cadet MD   0.5 mg at 09/05/21 7616    cloNIDine (CATAPRES) 0.2 MG/24HR 1 patch  1 patch TransDERmal Weekly Toyin Sequeira MD   1 patch at 09/03/21 1145    heparin (porcine) injection 5,000 Units  5,000 Units SubCUTAneous 3 times per day Karrie Lennon MD   5,000 Units at 09/08/21 0551    albumin human 25 % IV solution 25 g  25 g IntraVENous PRN Jose Smith MD   Stopped at 09/01/21 1439    insulin lispro (1 Unit Dial) 0-6 Units  0-6 Units SubCUTAneous Q4H Ahmad A Oleg, DO   1 Units at 09/08/21 0402    glucose (GLUTOSE) 40 % oral gel 15 g  15 g Oral PRN Ahmad IRA Archerzi, DO        dextrose 50 % IV solution  12.5 g IntraVENous PRN Kennedi Dejesus, DO        glucagon (rDNA) injection 1 mg  1 mg IntraMUSCular PRN Ahmad IRA Dejesus, DO        dextrose 5 % solution  100 mL/hr IntraVENous PRN Reymundod IRA Dejesus, DO        pantoprazole (PROTONIX) injection 40 mg  40 mg IntraVENous BID Skye Zhou MD   40 mg at 09/08/21 1055    fentaNYL (SUBLIMAZE) injection 50 mcg  50 mcg IntraVENous Q1H PRN Ludy Calvert MD        sodium chloride flush 0.9 % injection 5-40 mL  5-40 mL IntraVENous 2 times per day Kennedi Dejesus, DO   10 mL at 09/08/21 1054    sodium chloride flush 0.9 % injection 5-40 mL  5-40 mL IntraVENous PRN Quentinmad IRA Dejesus, DO   10 mL at 09/05/21 1657    0.9 % sodium chloride infusion  25 mL IntraVENous PRN Quentinmad IRA Dejesus, DO   Stopped at 08/29/21 2301    ondansetron (ZOFRAN-ODT) disintegrating tablet 4 mg  4 mg Oral Q8H PRN Ahmad A Oleg, DO        Or    ondansetron (ZOFRAN) injection 4 mg  4 mg IntraVENous Q6H PRN Ahmad A Oleg, DO        polyethylene glycol (GLYCOLAX) packet 17 g  17 g Oral Daily PRN Kennedi Dejesus DO        acetaminophen (TYLENOL) tablet 650 mg  650 mg Oral Q6H PRN Brittany Dejesus DO        Or    acetaminophen (TYLENOL) suppository 650 mg  650 mg Rectal Q6H PRN Demetri Dejesus DO   650 mg at 09/02/21 7611           Pre-Sedation:    Pre-Sedation Documentation and Exam:  I have assessed the patient and agree with the H&P present on the chart. Prior History of Anesthesia Complications:   none    Modified Mallampati:  II (soft palate, uvula, fauces visible)    ASA Classification:  Class 3 - A patient with severe systemic disease that limits activity but is not incapacitating      Kay Scale: Activity:  2 - Able to move 4 extremities voluntarily on command  Respiration:  2 - Able to breathe deeply and cough freely  Circulation:  2 - BP+/- 20mmHg of normal  Consciousness:  2 - Fully awake  Oxygen Saturation (color):  2 - Able to maintain oxygen saturation >92% on room air    Sedation/Anesthesia Plan:  Guard the patient's safety and welfare. Minimize physical discomfort and pain. Minimize negative psychological responses to treatment by providing sedation and analgesia and maximize the potential amnesia. Patient to meet pre-procedure discharge plan.     Medication Planned:  midazolam intravenously and fentanyl intravenously    Patient is an appropriate candidate for plan of sedation: yes      Electronically signed by Kathryn Tovar MD on 9/8/2021 at 2:18 PM

## 2021-09-08 NOTE — PROGRESS NOTES
Occupational Therapy   Occupational Therapy Initial Assessment  Date: 2021   Patient Name: Tonie Crump  MRN: [id number]     : 1975    Date of Service: 2021    Discharge Recommendations:    Tonie Crump scored a  on the AM-PAC ADL Inpatient form. Current research shows that an AM-PAC score of 17 or less is typically not associated with a discharge to the patient's home setting. Based on the patient's AM-PAC score and their current ADL deficits, it is recommended that the patient have 5-7 sessions per week of Occupational Therapy at d/c to increase the patient's independence. At this time, this patient demonstrates the endurance, and/or tolerance for 3 hours of therapy each day, with a treatment frequency of 5-7x/wk. Please see assessment section for further patient specific details. If patient discharges prior to next session this note will serve as a discharge summary. Please see below for the latest assessment towards goals. OT Equipment Recommendations  Other: defer to next level of care - pt has shower chair and RW    Assessment   Performance deficits / Impairments: Decreased functional mobility ; Decreased ADL status; Decreased strength;Decreased safe awareness;Decreased cognition;Decreased endurance;Decreased balance;Decreased vision/visual deficit; Decreased high-level IADLs;Decreased coordination  Assessment: pt presents with below deficits, significantly below baseline function and would benefit from continued therapy to address these deficits  Treatment Diagnosis: decreased ADL/mobility/activity tolerance  Prognosis: Fair  Decision Making: Medium Complexity  OT Education: OT Role;Plan of Care;Transfer Training;Orientation  Patient Education: pt is not an independent learner at this time - continue to educate to reinforce carryover  Barriers to Learning: cognition  REQUIRES OT FOLLOW UP: Yes  Activity Tolerance  Activity Tolerance: Treatment limited secondary to decreased cognition;Patient Tolerated treatment well  Safety Devices  Safety Devices in place: Yes  Type of devices: Call light within reach; Chair alarm in place; Left in chair;Nurse notified  Restraints  Initially in place: No           Patient Diagnosis(es): The primary encounter diagnosis was Pneumonia due to infectious organism, unspecified laterality, unspecified part of lung. Diagnoses of Respiratory failure with hypoxia, unspecified chronicity ([address] and Encephalopathy were also pertinent to this visit. has a past medical history of Blind in both eyes, Cerebral artery occlusion with cerebral infarction ([address] CHF (congestive heart failure) ([address] Chronic kidney disease, Depression, Diabetes mellitus out of control ([address] Diabetes mellitus, type II ([address] Diabetic neuropathy associated with type 2 diabetes mellitus ([address] Generalized headaches, Hypertension, Infertility, Insomnia, Migraine headache, Mixed hyperlipidemia, Otitis media, Pelvic abscess in female, Pneumonia, and Stroke ([address] has a past surgical history that includes Cervix surgery; eye surgery; Foot surgery (Right); Foot surgery (Bilateral); Foot surgery (Left); and IR TUNNELED CVC PLACE WO SQ PORT/PUMP > 5 YEARS (9/7/2021).     Treatment Diagnosis: decreased ADL/mobility/activity tolerance      Restrictions  Restrictions/Precautions  Restrictions/Precautions: General Precautions, Fall Risk  Position Activity Restriction  Other position/activity restrictions: 45yo admitted through the ER on 8/27 with respiratory distress - intubated - extubated 9/4 - history of DM, CHF, CVA with R sided weakness 2020 - initiated dialysis with patient during this stay with tunnel cath placement 9/7    Subjective   General  Chart Reviewed: Yes  Patient assessed for rehabilitation services?: Yes  Additional Pertinent Hx: CVA, DM, CHF, dialysis  Response to previous treatment: Patient with no complaints from previous session  Family / Caregiver Present: No  Diagnosis: acute respiratory failure  Subjective  Subjective: pt in bed on arrival - agreeable with encouragement - requires frequent orientation to environment and situation  Patient Currently in Pain: Denies  Vital Signs  Patient Currently in Pain: Denies  Social/Functional History  Social/Functional History  Lives With: Spouse  Type of Home: House  Home Layout: One level  Home Access: Level entry  Bathroom Shower/Tub: Walk-in shower  Bathroom Toilet: Standard  Bathroom Equipment: Grab bars in [address] [place]: Rolling walker  Receives Help From: Family  ADL Assistance: Needs assistance  Homemaking Assistance: Needs assistance  Ambulation Assistance: Needs assistance  IADL Comments: pt reports  is with her at all times, reports she does not leave her condo much as she was falling frequently when she did - reports 3 recent falls at home but unable to give details       Objective   Vision: Impaired  Hearing: Within functional limits    Orientation  Overall Orientation Status: Impaired  Orientation Level: Oriented to person;Disoriented to place; Disoriented to situation     Standing Balance  Time: 30 seconds x 1, 1 minutes x 1  Activity: standing EOB - SPT to reclining chair with RW  ADL  Grooming: Maximum assistance  LE Dressing: Dependent/Total  Tone RUE  RUE Tone: Normotonic  Tone LUE  LUE Tone: Normotonic  Coordination  Coordination and Movement description: Gross motor impairments; Fine motor impairments;Decreased accuracy; Decreased speed     Bed mobility  Supine to Sit: Dependent/Total (max A of 2 persons)  Scooting:  Moderate assistance  Transfers  Stand Pivot Transfers: Dependent/Total  Slide Board: Dependent/Total (mod A of 2 persons)  Sit to stand: Dependent/Total  Transfer Comments: mod A of 2 persons to SPT from EOB to reclining chair with RW - pt with posterior lean  Vision - Basic Assessment  Prior Vision: Other (add comment)  Vision Comments: pt reports significant visual impairements - is a poor historian - unable to read clock - states she can see contrast but not details - does not wear glasses  Cognition  Overall Cognitive Status: Exceptions  Arousal/Alertness: Delayed responses to stimuli;Inconsistent responses to stimuli  Following Commands:  Follows one step commands with increased time  Attention Span: Attends with cues to redirect  Memory: Decreased recall of recent events;Decreased short term memory  Safety Judgement: Decreased awareness of need for assistance  Problem Solving: Assistance required to generate solutions;Assistance required to implement solutions  Insights: Decreased awareness of deficits  Initiation: Requires cues for some  Sequencing: Requires cues for some                 LUE AROM (degrees)  LUE General AROM: strength and ROM appears generally intact - not tested formally due to pt's cognition - pt reports R arm weakness since CVA last year                      Plan   Plan  Times per week: 3-5x per week  Times per day: Daily  Specific instructions for Next Treatment: continue cotx to maximize safe performance with mobility  Current Treatment Recommendations: Strengthening, ROM, Balance Training, Functional Mobility Training, Endurance Training, Safety Education & Training, Self-Care / ADL, Positioning, Cognitive Reorientation, Patient/Caregiver Education & Training, Equipment Evaluation, Education, & procurement    G-Code     OutComes Score                                                  AM-PAC Score        AM-[facility] Inpatient Daily Activity Raw Score: 12 (09/08/21 1202)  AM-PAC Inpatient ADL T-Scale Score : 30.6 (09/08/21 1202)  ADL Inpatient CMS 0-100% Score: 66.57 (09/08/21 1202)  ADL Inpatient CMS G-Code Modifier : CL (09/08/21 1202)    Goals  Short term goals  Time Frame for Short term goals: discharge  Short term goal 1: mod A of 1 functional transfers  Short term goal 2: mod A of 1 functional mobility with AAE  Short term goal 3: min A UB ADLs  Short term goal 4: mod A LB ADLs  Short term goal 5: pt will tolerate standing 5 minutes for ADL activities  Patient Goals   Patient goals : \"to get stronger\"       Therapy Time   Individual Concurrent Group Co-treatment   Time In       1004   Time Out       1042   Minutes       38   Timed Code Treatment Minutes: 28 Minutes   Total minutes [address] [facility], OT   Valentino JAQUEZ/L [id number], 9/8/2021, 12:19 PM

## 2021-09-08 NOTE — CARE COORDINATION
CM notes consult for HD chair, called Jose/ Roby to request chair. CM will obtain preferred date/ time from family.     RAHEEL RubiN, CCM, RN  [facility]  [phone]

## 2021-09-08 NOTE — PROGRESS NOTES
Speech Language Pathology  Attempt/Hold Note    Loccristiano Tenzin  1975    SLP attempted to see pt for dysphagia intervention. Chart reviewed, spoke with RN. Pt currently NPO for possible procedure. Will hold therapy and re-attempt when pt is appropriate for PO intake.     Faith Terrazas, [id number] [facility]  Speech-Language Pathologist  [phone]

## 2021-09-08 NOTE — FLOWSHEET NOTE
09/08/21 0642 09/08/21 0943   Vital Signs   BP (!) 164/64 (!) 157/60   Temp 97.6 °F (36.4 °C) 97.3 °F (36.3 °C)   Pulse 92 102   Resp 17 16     Treatment time: 3 hours    Net UF: 2.1 L    Pre weight: 80.2 kg (bed scale)  Post weight: 78 kg (bed scale)  EDW: 78 kg (established today)    Access used: RIJ HD tunneled cath  Access function: No problems    Medications or blood products given: Procrit 25430 units IVP. Regular outpatient schedule: TBD    Summary of response to treatment: Tolerated 3 hour HD tx with hypotension, 92/50, last hour of tx. UFG decreased from 3 L to minimum, 2.1 L. Completed remainder of tx with 's. Copy of dialysis treatment record placed in chart, to be scanned into EMR. Report given to John Lane RN.

## 2021-09-08 NOTE — PROGRESS NOTES
Pt returned to CVU from cath lab. Right radial TR band in place. No signs of hematoma, oozing. Bruising was noted pre procedure. Will start removing air from TR band at 1600.

## 2021-09-08 NOTE — PROGRESS NOTES
Patients chart was reviewed post Tunneled HD Catheter placement procedure. No complications were noted post procedure.

## 2021-09-08 NOTE — OP NOTE
Patient:  Sabina Notice   :   1975    Procedural Summary  ~Consent:   Obtained written and verbal consent      Risks/benefits explained in detail  ~Procedure:    Left Heart Catheterization  ~Medications:    Procedural sedation with minimal conscious sedation  ~Complications:   None  ~Blood Loss:    <10cc  ~Specimens:    None obtained  ~Pre-sedation re-evaluation: Performed immediately prior to procedure. Medication and Procedural Reconciliation:  An independent trained observer pushed medications at my direction. We monitored the patient's level of consciousness and vital signs/physiologic status throughout the procedure duration (see start and stop times below). Sedation: 3 mg Versed, 75 mcg Fentanyl  Sedation start: 1440  Sedation stop: 1518    Cardiac Cath LVG:  Anatomy:   LM-nml   LAD-nml  Cx-nml  OM- nml  RCA-mid 70%  RPDA- nml  LVEF- 65  LVG- nml  LVEDP- 14    Intervention  FFR RCA 0.90    Contrast: 69  Flouro Time: 5.4  Access: R radial a    Impression  ~Coronary Angiography w/ nonobstructive CAD  ~LVG with LVEF of 60 and no regional wall motion abnormalities        Recommendation  ~Aggressive medical treatment and risk factor modification  ~1. Medications reviewed  2. Stop heparin gtt. Post cath IVF. Bedrest.  3. Recommend statin  4. Patient has been advised on the importance of regular exercise of at least 20-30 minutes daily alternating with aerobic and isometric activities. 5. Patient counseled about and offered assistance for smoking cessation   6.  No indication for cardiac rehab            Dada Tapia MD, MD 2021 3:38 PM

## 2021-09-08 NOTE — PLAN OF CARE
Problem: Cardiovascular  Goal: Hemodynamic stability  Outcome: Ongoing     Problem: Nutrition  Goal: Optimal nutrition therapy  Outcome: Ongoing     Problem: Discharge Planning:  Goal: Participates in care planning  Description: Participates in care planning  Outcome: Ongoing  Goal: Discharged to appropriate level of care  Description: Discharged to appropriate level of care  Outcome: Ongoing  Goal: Ability to perform activities of daily living will improve  Description: Ability to perform activities of daily living will improve  Outcome: Ongoing     Problem: Airway Clearance - Ineffective:  Goal: Ability to maintain a clear airway will improve  Description: Ability to maintain a clear airway will improve  Outcome: Ongoing     Problem: Anxiety/Stress:  Goal: Level of anxiety will decrease  Description: Level of anxiety will decrease  Outcome: Ongoing     Problem: Aspiration:  Goal: Absence of aspiration  Description: Absence of aspiration  Outcome: Ongoing     Problem:  Bowel Function - Altered:  Goal: Bowel elimination is within specified parameters  Description: Bowel elimination is within specified parameters  Outcome: Ongoing     Problem: Cardiac Output - Decreased:  Goal: Hemodynamic stability will improve  Description: Hemodynamic stability will improve  Outcome: Ongoing     Problem: Fluid Volume - Imbalance:  Goal: Absence of imbalanced fluid volume signs and symptoms  Description: Absence of imbalanced fluid volume signs and symptoms  Outcome: Ongoing     Problem: Gas Exchange - Impaired:  Goal: Levels of oxygenation will improve  Description: Levels of oxygenation will improve  Outcome: Ongoing     Problem: Mental Status - Impaired:  Goal: Mental status will be restored to baseline  Description: Mental status will be restored to baseline  Outcome: Ongoing     Problem: Nutrition Deficit:  Goal: Ability to achieve adequate nutritional intake will improve  Description: Ability to achieve adequate nutritional intake will improve  Outcome: Ongoing     Problem: Pain:  Goal: Pain level will decrease  Description: Pain level will decrease  Outcome: Ongoing  Goal: Recognizes and communicates pain  Description: Recognizes and communicates pain  Outcome: Ongoing  Goal: Control of acute pain  Description: Control of acute pain  Outcome: Ongoing  Goal: Control of chronic pain  Description: Control of chronic pain  Outcome: Ongoing     Problem: Serum Glucose Level - Abnormal:  Goal: Ability to maintain appropriate glucose levels will improve to within specified parameters  Description: Ability to maintain appropriate glucose levels will improve to within specified parameters  Outcome: Ongoing     Problem: Skin Integrity - Impaired:  Goal: Will show no infection signs and symptoms  Description: Will show no infection signs and symptoms  Outcome: Ongoing  Goal: Absence of new skin breakdown  Description: Absence of new skin breakdown  Outcome: Ongoing     Problem: Sleep Pattern Disturbance:  Goal: Appears well-rested  Description: Appears well-rested  Outcome: Ongoing     Problem: Tissue Perfusion, Altered:  Goal: Circulatory function within specified parameters  Description: Circulatory function within specified parameters  Outcome: Ongoing     Problem: Tissue Perfusion - Cardiopulmonary, Altered:  Goal: Absence of angina  Description: Absence of angina  Outcome: Ongoing  Goal: Hemodynamic stability will improve  Description: Hemodynamic stability will improve  Outcome: Ongoing     Problem: Falls - Risk of:  Goal: Will remain free from falls  Description: Will remain free from falls  Outcome: Ongoing  Goal: Absence of physical injury  Description: Absence of physical injury  Outcome: Ongoing     Problem: Skin Integrity:  Goal: Will show no infection signs and symptoms  Description: Will show no infection signs and symptoms  Outcome: Ongoing  Goal: Absence of new skin breakdown  Description: Absence of new skin breakdown  Outcome: Ongoing     Problem: Confusion - Acute:  Goal: Mental status will be restored to baseline  Description: Mental status will be restored to baseline  Outcome: Ongoing  Goal: Absence of continued neurological deterioration signs and symptoms  Description: Absence of continued neurological deterioration signs and symptoms  Outcome: Ongoing     Problem: Injury - Risk of, Physical Injury:  Goal: Will remain free from falls  Description: Will remain free from falls  Outcome: Ongoing  Goal: Absence of physical injury  Description: Absence of physical injury  Outcome: Ongoing     Problem: Mood - Altered:  Goal: Mood stable  Description: Mood stable  Outcome: Ongoing  Goal: Absence of abusive behavior  Description: Absence of abusive behavior  Outcome: Ongoing  Goal: Verbalizations of feeling emotionally comfortable while being cared for will increase  Description: Verbalizations of feeling emotionally comfortable while being cared for will increase  Outcome: Ongoing     Problem: Psychomotor Activity - Altered:  Goal: Absence of psychomotor disturbance signs and symptoms  Description: Absence of psychomotor disturbance signs and symptoms  Outcome: Ongoing     Problem: Sensory Perception - Impaired:  Goal: Demonstrations of improved sensory functioning will increase  Description: Demonstrations of improved sensory functioning will increase  Outcome: Ongoing  Goal: Decrease in sensory misperception frequency  Description: Decrease in sensory misperception frequency  Outcome: Ongoing  Goal: Able to refrain from responding to false sensory perceptions  Description: Able to refrain from responding to false sensory perceptions  Outcome: Ongoing  Goal: Demonstrates accurate environmental perceptions  Description: Demonstrates accurate environmental perceptions  Outcome: Ongoing  Goal: Able to distinguish between reality-based and nonreality-based thinking  Description: Able to distinguish between reality-based and nonreality-based thinking  Outcome: Ongoing  Goal: Able to interrupt nonreality-based thinking  Description: Able to interrupt nonreality-based thinking  Outcome: Ongoing

## 2021-09-08 NOTE — PROGRESS NOTES
Physical Therapy    Facility/Department: [facility]  Initial Assessment    NAME: Orlando Baron  : 1975  MRN: [id number]    Date of Service: 2021    Discharge Recommendations: Orlando Baron scored a  on the AM-PAC short mobility form. Current research shows that an AM-PAC score of 17 or less is typically not associated with a discharge to the patient's home setting. Based on the patient's AM-PAC score and their current functional mobility deficits, it is recommended that the patient have 3-5 sessions per week of Physical Therapy at d/c to increase the patient's independence. Please see assessment section for further patient specific details. If patient discharges prior to next session this note will serve as a discharge summary. Please see below for the latest assessment towards goals. PT Equipment Recommendations  Equipment Needed: Yes  Mobility Devices: Branden Ceiba: Rolling    Assessment   Body structures, Functions, Activity limitations: Decreased functional mobility ; Decreased ROM; Decreased strength;Decreased safe awareness;Decreased cognition;Decreased endurance;Decreased vision/visual deficit; Decreased posture;Decreased balance  Assessment: Pt required mod-max A of 2 with bed mobility and transfers; pt demonstrated posterior lean when standing to the walker with no improvements with tactile cueing. Skilled PT will be necessary to improve above deficits in order to return to PLOF  Treatment Diagnosis: Decreased functional mobility and balance  Prognosis: Good  Decision Making: Medium Complexity  History: ARF  Exam: bed mobility, transfers  PT Education: PT Role;Plan of Care;Orientation;General Safety  Barriers to Learning: orientation  REQUIRES PT FOLLOW UP: Yes  Activity Tolerance  Activity Tolerance: Patient limited by fatigue;Patient limited by endurance; Patient limited by cognitive status       Patient Diagnosis(es): The primary encounter diagnosis was Pneumonia due to infectious organism, unspecified laterality, unspecified part of lung. Diagnoses of Respiratory failure with hypoxia, unspecified chronicity ([address] and Encephalopathy were also pertinent to this visit. has a past medical history of Blind in both eyes, Cerebral artery occlusion with cerebral infarction ([address] CHF (congestive heart failure) ([address] Chronic kidney disease, Depression, Diabetes mellitus out of control ([address] Diabetes mellitus, type II ([address] Diabetic neuropathy associated with type 2 diabetes mellitus ([address] Generalized headaches, Hypertension, Infertility, Insomnia, Migraine headache, Mixed hyperlipidemia, Otitis media, Pelvic abscess in female, Pneumonia, and Stroke ([address] has a past surgical history that includes Cervix surgery; eye surgery; Foot surgery (Right); Foot surgery (Bilateral); Foot surgery (Left); and IR TUNNELED CVC PLACE WO SQ PORT/PUMP > 5 YEARS (9/7/2021).     Restrictions  Restrictions/Precautions  Restrictions/Precautions: General Precautions, Fall Risk  Position Activity Restriction  Other position/activity restrictions: 47yo admitted through the ER on 8/27 with respiratory distress - intubated - extubated 9/4 - history of DM, CHF, CVA with R sided weakness 2020 - initiated dialysis with patient during this stay with tunnel cath placement 9/7  Vision/Hearing  Vision: Impaired  Hearing: Within functional limits     Subjective  General  Chart Reviewed: Yes  Patient assessed for rehabilitation services?: Yes  Response To Previous Treatment: Not applicable  Family / Caregiver Present: No  General Comment  Comments: Pt semi-reclined in bed upon PT/OT arrival; agreeable to PT/OT eval  Pain Screening  Patient Currently in Pain: Denies  Vital Signs  Patient Currently in Pain: Denies       Orientation  Orientation  Overall Orientation Status: Impaired  Orientation Level: Oriented to person;Disoriented to situation  Social/Functional History  Social/Functional History  Lives With: Spouse  Type of Home: House  Home Layout: One level  Home Access: Level entry  Bathroom Shower/Tub: Walk-in shower  Bathroom Toilet: Standard  Bathroom Equipment: Grab bars in shower  Home Equipment: Rolling walker  Receives Help From: Family  ADL Assistance: Needs assistance  Homemaking Assistance: Needs assistance  Ambulation Assistance: Needs assistance  IADL Comments: pt reports  is with her at all times, reports she does not leave her condo much as she was falling frequently when she did - reports 3 recent falls at home but unable to give details         Objective          AROM RLE (degrees)  RLE AROM: WFL (assessed through functional mobility)  AROM LLE (degrees)  LLE AROM : WFL (assessed through functional mobility)  Strength RLE  Strength RLE: Exception (assessed through functional mobility)  Comment: required min A with lifting legs up from bed  Strength LLE  Strength LLE: Exception (assessed through functional mobility)  Comment: required min A with lifting legs up from bed        Bed mobility  Supine to Sit: Dependent/Total (max A of 2)  Scooting: Moderate assistance  Transfers  Sit to Stand: 2 Person Assistance; Moderate Assistance  Stand to sit: Moderate Assistance;2 Person Assistance  Stand Pivot Transfers: 2 Person Assistance; Moderate Assistance  Comment: transferred from bed-->chair with stand-pivot transfer using RW. Pt demonstrated posterior trunk lean with standing; assistance from therapist needed to turn RW. Ambulation  Ambulation?: No  Stairs/Curb  Stairs?: No     Balance  Posture: Good  Sitting - Static: Poor;+  Sitting - Dynamic: Poor  Standing - Static: Poor  Standing - Dynamic: Poor  Comments: Variable assistance needed with sitting at EOB for ~5 min (min-maxA); pt occasional leaned toward R side, needing tactile cues to correct.         Plan   Plan  Times per week: 3-5x  Times per day: Daily  Current Treatment Recommendations: Strengthening, ROM, Balance Training, Functional Mobility Training, Transfer Training, Endurance Training, Gait Training  Safety Devices  Type of devices: All fall risk precautions in place, Chair alarm in place, Call light within reach, Patient at risk for falls, Left in chair, Nurse notified, Gait belt                                                      AM-PAC Score  AM-PAC Inpatient Mobility Raw Score : 9 (09/08/21 1234)  AM-PAC Inpatient T-Scale Score : 30.55 (09/08/21 1234)  Mobility Inpatient CMS 0-100% Score: 81.38 (09/08/21 1234)  Mobility Inpatient CMS G-Code Modifier : CM (09/08/21 1234)          Goals  Short term goals  Time Frame for Short term goals: discharge  Short term goal 1: Pt will be able to perform bed mobility with min A  Short term goal 2: Pt will be able to perform STS with min A  Short term goal 3: PT will be able to sit at EOB with min A  Short term goal 4: Pt will be able to stand with LRAD and min A       Therapy Time   Individual Concurrent Group Co-treatment   Time In       1004   Time Out       1044   Minutes       40          Timed Code Treatment Minutes:  25 minutes    Total Treatment Minutes:  40 minutes    STACIA Saucedo  Therapist was present, directed the patient's care, made skilled judgement, and was responsible for assessment and treatment of the patient.   Co-signed and supervised by: Toya Fung PT, DPT [id number]    Toya Fung PT

## 2021-09-09 LAB
A/G RATIO: 0.8 (ref 1.1–2.2)
ALBUMIN SERPL-MCNC: 2.8 G/DL (ref 3.4–5)
ALP BLD-CCNC: 117 U/L (ref 40–129)
ALT SERPL-CCNC: <5 U/L (ref 10–40)
ANION GAP SERPL CALCULATED.3IONS-SCNC: 15 MMOL/L (ref 3–16)
ANISOCYTOSIS: ABNORMAL
APTT: 37.2 SEC (ref 26.2–38.6)
AST SERPL-CCNC: 11 U/L (ref 15–37)
BASOPHILS ABSOLUTE: 0 K/UL (ref 0–0.2)
BASOPHILS RELATIVE PERCENT: 0 %
BILIRUB SERPL-MCNC: <0.2 MG/DL (ref 0–1)
BUN BLDV-MCNC: 24 MG/DL (ref 7–20)
BURR CELLS: ABNORMAL
CALCIUM IONIZED: 1.17 MMOL/L (ref 1.12–1.32)
CALCIUM SERPL-MCNC: 8.7 MG/DL (ref 8.3–10.6)
CHLORIDE BLD-SCNC: 103 MMOL/L (ref 99–110)
CO2: 17 MMOL/L (ref 21–32)
CREAT SERPL-MCNC: 3.7 MG/DL (ref 0.6–1.1)
CULTURE, BLOOD 2: NORMAL
EOSINOPHILS ABSOLUTE: 0 K/UL (ref 0–0.6)
EOSINOPHILS RELATIVE PERCENT: 0 %
GFR AFRICAN AMERICAN: 16
GFR NON-AFRICAN AMERICAN: 13
GLOBULIN: 3.6 G/DL
GLUCOSE BLD-MCNC: 129 MG/DL (ref 70–99)
GLUCOSE BLD-MCNC: 131 MG/DL (ref 70–99)
GLUCOSE BLD-MCNC: 162 MG/DL (ref 70–99)
GLUCOSE BLD-MCNC: 163 MG/DL (ref 70–99)
GLUCOSE BLD-MCNC: 174 MG/DL (ref 70–99)
GLUCOSE BLD-MCNC: 208 MG/DL (ref 70–99)
GLUCOSE BLD-MCNC: 223 MG/DL (ref 70–99)
HCT VFR BLD CALC: 30.9 % (ref 36–48)
HEMOGLOBIN: 10.2 G/DL (ref 12–16)
LACTIC ACID: 0.7 MMOL/L (ref 0.4–2)
LYMPHOCYTES ABSOLUTE: 3.2 K/UL (ref 1–5.1)
LYMPHOCYTES RELATIVE PERCENT: 16 %
MACROCYTES: ABNORMAL
MAGNESIUM: 2 MG/DL (ref 1.8–2.4)
MCH RBC QN AUTO: 28.6 PG (ref 26–34)
MCHC RBC AUTO-ENTMCNC: 33 G/DL (ref 31–36)
MCV RBC AUTO: 86.8 FL (ref 80–100)
MONOCYTES ABSOLUTE: 0.8 K/UL (ref 0–1.3)
MONOCYTES RELATIVE PERCENT: 4 %
NEUTROPHILS ABSOLUTE: 16.2 K/UL (ref 1.7–7.7)
NEUTROPHILS RELATIVE PERCENT: 80 %
NUCLEATED RED BLOOD CELLS: 1 /100 WBC
PDW BLD-RTO: 16 % (ref 12.4–15.4)
PERFORMED ON: ABNORMAL
PH VENOUS: 7.36 (ref 7.35–7.45)
PHOSPHORUS: 3.3 MG/DL (ref 2.5–4.9)
PLATELET # BLD: 324 K/UL (ref 135–450)
PLATELET SLIDE REVIEW: ADEQUATE
PMV BLD AUTO: 8.6 FL (ref 5–10.5)
POLYCHROMASIA: ABNORMAL
POTASSIUM SERPL-SCNC: 3.8 MMOL/L (ref 3.5–5.1)
RBC # BLD: 3.56 M/UL (ref 4–5.2)
SLIDE REVIEW: ABNORMAL
SODIUM BLD-SCNC: 135 MMOL/L (ref 136–145)
TOTAL CK: 46 U/L (ref 26–192)
TOTAL PROTEIN: 6.4 G/DL (ref 6.4–8.2)
WBC # BLD: 20.2 K/UL (ref 4–11)

## 2021-09-09 PROCEDURE — 85730 THROMBOPLASTIN TIME PARTIAL: CPT

## 2021-09-09 PROCEDURE — 99232 SBSQ HOSP IP/OBS MODERATE 35: CPT | Performed by: NURSE PRACTITIONER

## 2021-09-09 PROCEDURE — 2580000003 HC RX 258: Performed by: INTERNAL MEDICINE

## 2021-09-09 PROCEDURE — 92526 ORAL FUNCTION THERAPY: CPT

## 2021-09-09 PROCEDURE — 6370000000 HC RX 637 (ALT 250 FOR IP): Performed by: INTERNAL MEDICINE

## 2021-09-09 PROCEDURE — 6360000002 HC RX W HCPCS: Performed by: INTERNAL MEDICINE

## 2021-09-09 PROCEDURE — 97530 THERAPEUTIC ACTIVITIES: CPT

## 2021-09-09 PROCEDURE — 85025 COMPLETE CBC W/AUTO DIFF WBC: CPT

## 2021-09-09 PROCEDURE — 92523 SPEECH SOUND LANG COMPREHEN: CPT

## 2021-09-09 PROCEDURE — 82330 ASSAY OF CALCIUM: CPT

## 2021-09-09 PROCEDURE — 84100 ASSAY OF PHOSPHORUS: CPT

## 2021-09-09 PROCEDURE — 83605 ASSAY OF LACTIC ACID: CPT

## 2021-09-09 PROCEDURE — 82550 ASSAY OF CK (CPK): CPT

## 2021-09-09 PROCEDURE — C9113 INJ PANTOPRAZOLE SODIUM, VIA: HCPCS | Performed by: INTERNAL MEDICINE

## 2021-09-09 PROCEDURE — 2000000000 HC ICU R&B

## 2021-09-09 PROCEDURE — 36415 COLL VENOUS BLD VENIPUNCTURE: CPT

## 2021-09-09 PROCEDURE — 80053 COMPREHEN METABOLIC PANEL: CPT

## 2021-09-09 PROCEDURE — 83735 ASSAY OF MAGNESIUM: CPT

## 2021-09-09 RX ORDER — HALOPERIDOL 5 MG/ML
5 INJECTION INTRAMUSCULAR EVERY 12 HOURS PRN
Status: DISCONTINUED | OUTPATIENT
Start: 2021-09-09 | End: 2021-09-13 | Stop reason: HOSPADM

## 2021-09-09 RX ORDER — PANTOPRAZOLE SODIUM 40 MG/1
TABLET, DELAYED RELEASE ORAL
Status: DISPENSED
Start: 2021-09-09 | End: 2021-09-10

## 2021-09-09 RX ORDER — PANTOPRAZOLE SODIUM 40 MG/1
40 TABLET, DELAYED RELEASE ORAL 2 TIMES DAILY
Status: DISCONTINUED | OUTPATIENT
Start: 2021-09-09 | End: 2021-09-13 | Stop reason: HOSPADM

## 2021-09-09 RX ORDER — INSULIN LISPRO 100 [IU]/ML
0-6 INJECTION, SOLUTION INTRAVENOUS; SUBCUTANEOUS
Status: DISCONTINUED | OUTPATIENT
Start: 2021-09-10 | End: 2021-09-13 | Stop reason: HOSPADM

## 2021-09-09 RX ORDER — HALOPERIDOL 5 MG/ML
5 INJECTION INTRAMUSCULAR EVERY 6 HOURS PRN
Status: DISCONTINUED | OUTPATIENT
Start: 2021-09-09 | End: 2021-09-09

## 2021-09-09 RX ORDER — ASPIRIN 81 MG/1
81 TABLET ORAL DAILY
Status: DISCONTINUED | OUTPATIENT
Start: 2021-09-09 | End: 2021-09-13 | Stop reason: HOSPADM

## 2021-09-09 RX ORDER — INSULIN LISPRO 100 [IU]/ML
0-3 INJECTION, SOLUTION INTRAVENOUS; SUBCUTANEOUS NIGHTLY
Status: DISCONTINUED | OUTPATIENT
Start: 2021-09-09 | End: 2021-09-13 | Stop reason: HOSPADM

## 2021-09-09 RX ADMIN — FUROSEMIDE 40 MG: 40 TABLET ORAL at 08:23

## 2021-09-09 RX ADMIN — HEPARIN SODIUM 5000 UNITS: 5000 INJECTION INTRAVENOUS; SUBCUTANEOUS at 22:12

## 2021-09-09 RX ADMIN — INSULIN LISPRO 1 UNITS: 100 INJECTION, SOLUTION INTRAVENOUS; SUBCUTANEOUS at 00:29

## 2021-09-09 RX ADMIN — ATORVASTATIN CALCIUM 40 MG: 40 TABLET, FILM COATED ORAL at 22:12

## 2021-09-09 RX ADMIN — LORAZEPAM 0.5 MG: 2 INJECTION INTRAMUSCULAR; INTRAVENOUS at 02:32

## 2021-09-09 RX ADMIN — SERTRALINE 50 MG: 50 TABLET, FILM COATED ORAL at 08:26

## 2021-09-09 RX ADMIN — INSULIN LISPRO 2 UNITS: 100 INJECTION, SOLUTION INTRAVENOUS; SUBCUTANEOUS at 11:34

## 2021-09-09 RX ADMIN — INSULIN LISPRO 1 UNITS: 100 INJECTION, SOLUTION INTRAVENOUS; SUBCUTANEOUS at 05:19

## 2021-09-09 RX ADMIN — Medication 10 ML: at 08:25

## 2021-09-09 RX ADMIN — Medication 10 ML: at 22:20

## 2021-09-09 RX ADMIN — Medication 10 ML: at 23:00

## 2021-09-09 RX ADMIN — HALOPERIDOL LACTATE 5 MG: 5 INJECTION, SOLUTION INTRAMUSCULAR at 15:31

## 2021-09-09 RX ADMIN — INSULIN LISPRO 1 UNITS: 100 INJECTION, SOLUTION INTRAVENOUS; SUBCUTANEOUS at 22:21

## 2021-09-09 RX ADMIN — HEPARIN SODIUM 5000 UNITS: 5000 INJECTION INTRAVENOUS; SUBCUTANEOUS at 05:20

## 2021-09-09 RX ADMIN — Medication 10 ML: at 22:00

## 2021-09-09 RX ADMIN — OXYCODONE HYDROCHLORIDE AND ACETAMINOPHEN 1 TABLET: 5; 325 TABLET ORAL at 02:31

## 2021-09-09 RX ADMIN — PANTOPRAZOLE SODIUM 40 MG: 40 TABLET, DELAYED RELEASE ORAL at 22:04

## 2021-09-09 RX ADMIN — AMLODIPINE BESYLATE 10 MG: 5 TABLET ORAL at 08:24

## 2021-09-09 RX ADMIN — PROPRANOLOL HYDROCHLORIDE 80 MG: 80 CAPSULE, EXTENDED RELEASE ORAL at 08:23

## 2021-09-09 RX ADMIN — PANTOPRAZOLE SODIUM 40 MG: 40 INJECTION, POWDER, FOR SOLUTION INTRAVENOUS at 08:25

## 2021-09-09 RX ADMIN — HEPARIN SODIUM 5000 UNITS: 5000 INJECTION INTRAVENOUS; SUBCUTANEOUS at 14:24

## 2021-09-09 ASSESSMENT — PAIN SCALES - GENERAL
PAINLEVEL_OUTOF10: 0
PAINLEVEL_OUTOF10: 10

## 2021-09-09 NOTE — PROGRESS NOTES
Nutrition Assessment     Type and Reason for Visit: Reassess    Nutrition Recommendations/Plan:   Add Carbohydrate Controlled modifer to diet     Nutrition Assessment:  Pt's nutrition status is stable. Diet advanced to dysphagia soft and bite-sized, low phos s/p extubation. PO intake % at most all meals. Hx of DM, will add carb control diet modifer to existing diet. Will continue to follow nutrition status. Malnutrition Assessment:  Malnutrition Status: No malnutrition    Estimated Daily Nutrient Needs:  Energy (kcal): 2025 - 0729; Weight Used for Energy Requirements:  Current (81 kg)     Protein (g): 97 - 162; Weight Used for Protein Requirements:  Current (1.2 - 2.0g/81kg)        Fluid (ml/day): 2000; Weight Used for Fluid Requirements:  ml/Kg (25mL/kg)      Nutrition Related Findings: Na+ 125, 9/4 LBM; disoriented to time and situation; -18 liters; HD      Current Nutrition Therapies:    ADULT DIET; Dysphagia - Soft and Bite Sized; Low Phosphorus (Less than 1000 mg)    Anthropometric Measures:  · Height: 5' 6\" (167.6 cm)  · Current Body Wt: 185 lb (83.9 kg)   · BMI: 29.9    Nutrition Diagnosis:   No nutrition diagnosis at this time     Nutrition Interventions:   Food and/or Nutrient Delivery:  Modify Current Diet  Nutrition Education/Counseling:  Education not indicated   Coordination of Nutrition Care:  Continue to monitor while inpatient    Goals:  PO intake 75% or greater       Nutrition Monitoring and Evaluation:   Behavioral-Environmental Outcomes:  None Identified   Food/Nutrient Intake Outcomes:  Food and Nutrient Intake  Physical Signs/Symptoms Outcomes:  Weight     Discharge Planning:     Too soon to determine     Electronically signed by Sandra Dong RD, LD on 9/9/21 at 2:03 PM EDT  Contact: [phone]

## 2021-09-09 NOTE — PROGRESS NOTES
Physical Therapy  Facility/Department: [facility]  Daily Treatment Note  NAME: Tonie Crump  : 1975  MRN: [id number]    Date of Service: 2021    Discharge Lynnette Maldonado scored a  on the AM-PAC short mobility form. Current research shows that an AM-PAC score of 17 or less is typically not associated with a discharge to the patient's home setting. Based on the patient's AM-PAC score and their current functional mobility deficits, it is recommended that the patient have 3-5 sessions per week of Physical Therapy at d/c to increase the patient's independence. Please see assessment section for further patient specific details. If patient discharges prior to next session this note will serve as a discharge summary. Please see below for the latest assessment towards goals. Assessment   Body structures, Functions, Activity limitations: Decreased functional mobility ; Decreased ROM; Decreased strength;Decreased safe awareness;Decreased cognition;Decreased endurance;Decreased vision/visual deficit; Decreased posture;Decreased balance  Assessment: Pt required mod of 2 with bed mobility and transfers; pt demonstrated posterior lean when standing to the walker with no improvements with tactile cueing. Hallucinating. Skilled PT will be necessary to improve above deficits in order to return to PLOF  Treatment Diagnosis: Decreased functional mobility and balance  Prognosis: Good  Decision Making: Medium Complexity  PT Education: PT Role;Plan of Care;Orientation;General Safety;Transfer Training;Functional Mobility Training  Patient Education: Pt verbalized understanding but needs reinforcement. REQUIRES PT FOLLOW UP: Yes  Activity Tolerance  Activity Tolerance: Patient limited by fatigue;Patient limited by endurance; Patient limited by cognitive status     Patient Diagnosis(es): The primary encounter diagnosis was Pneumonia due to infectious organism, unspecified laterality, unspecified part of lung. Diagnoses of Respiratory failure with hypoxia, unspecified chronicity ([address] and Encephalopathy were also pertinent to this visit. has a past medical history of Blind in both eyes, Cerebral artery occlusion with cerebral infarction ([address] CHF (congestive heart failure) ([address] Chronic kidney disease, Depression, Diabetes mellitus out of control ([address] Diabetes mellitus, type II ([address] Diabetic neuropathy associated with type 2 diabetes mellitus ([address] Generalized headaches, Hypertension, Infertility, Insomnia, Migraine headache, Mixed hyperlipidemia, Otitis media, Pelvic abscess in female, Pneumonia, and Stroke ([address] has a past surgical history that includes Cervix surgery; eye surgery; Foot surgery (Right); Foot surgery (Bilateral); Foot surgery (Left); and IR TUNNELED CVC PLACE WO SQ PORT/PUMP > 5 YEARS (9/7/2021). Restrictions  Restrictions/Precautions  Restrictions/Precautions: General Precautions, Fall Risk (high fall risk)  Position Activity Restriction  Other position/activity restrictions: 45yo admitted through the ER on 8/27 with respiratory distress - intubated - extubated 9/4 - history of DM, CHF, CVA with R sided weakness 2020 - initiated dialysis with patient during this stay with tunnel cath placement 9/7  Subjective   General  Chart Reviewed: Yes  Response To Previous Treatment: Patient with no complaints from previous session. Family / Caregiver Present: No  Subjective  Subjective: Pt denies pain at rest. Agreeable to working with PT. General Comment  Comments: Pt supine in bed upon arrival. Pt hallucinating about posters in room and people being present who were not.   Pain Screening  Patient Currently in Pain: Denies  Vital Signs  Patient Currently in Pain: Denies       Orientation  Orientation  Overall Orientation Status: Impaired  Orientation Level: Oriented to person;Disoriented to situation  Cognition      Objective   Bed mobility  Supine to Sit: 2 Person assistance;Maximum assistance  Scooting: Moderate assistance  Comment: Pt sat EOB for 5 min with [facility] sitting balance. Transfers  Sit to Stand: Dependent/Total;2 Person Assistance (mod A x 2)  Stand to sit: Dependent/Total;2 Person Assistance (mod A x 2)  Stand Pivot Transfers: Dependent/Total;2 Person Assistance (mod A x 2)  Comment: Pt with hard post lean in standing. Stand-step transfer to chair but had difficulty advancing LEs and following instructions. Ambulation  Ambulation?: No  Stairs/Curb  Stairs?: No     Balance  Posture: Good  Sitting - Static: Fair  Sitting - Dynamic: Fair;- (min A to CGA sitting balance.)  Standing - Static: Poor  Standing - Dynamic: Poor  Comments: post lean louisa in standing. Comment: assisted with combing hair, positioned in chair for comfort and safety              G-Code     OutComes Score                                                     AM-PAC Score  AM-PAC Inpatient Mobility Raw Score : 11 (09/09/21 1354)  AM-PAC Inpatient T-Scale Score : 33.86 (09/09/21 [facility]4)  Mobility Inpatient CMS 0-100% Score: 72.57 (09/09/21 [facility]4)  Mobility Inpatient CMS G-Code Modifier : CL (09/09/21 [facility]4)          Goals  Short term goals  Time Frame for Short term goals: discharge (no goals met in full this date)  Short term goal 1: Pt will be able to perform bed mobility with min A  Short term goal 2: Pt will be able to perform STS with min A  Short term goal 3: PT will be able to sit at EOB with min A  Short term goal 4: Pt will be able to stand with LRAD and min A    Plan    Plan  Times per week: 3-5x  Times per day: Daily  Current Treatment Recommendations: Strengthening, ROM, Balance Training, Functional Mobility Training, Transfer Training, Endurance Training, Gait Training, Stair training, Patient/Caregiver Education & Training, Cognitive Reorientation, ADL/Self-care Training, Safety Education & Training  Safety Devices  Type of devices:  All fall risk precautions in place, Chair alarm in place, Call light within reach, Patient at risk for falls, Left in chair, Nurse notified, Gait belt  Restraints  Initially in place: No     Therapy Time   Individual Concurrent Group Co-treatment   Time In       1233   Time Out       1250   Minutes       17   Timed Code Treatment Minutes: [address], [id number]

## 2021-09-09 NOTE — PROGRESS NOTES
Occupational Therapy  Facility/Department: [facility]  Daily Treatment Note  NAME: Gus Cabrera  : 1975  MRN: [id number]    Date of Service: 2021    Discharge Recommendations: Gus Cabrera scored a  on the AM-PAC ADL Inpatient form. Current research shows that an AM-PAC score of 17 or less is typically not associated with a discharge to the patient's home setting. Based on the patient's AM-PAC score and their current ADL deficits, it is recommended that the patient have 5-7 sessions per week of Occupational Therapy at d/c to increase the patient's independence. At this time, this patient demonstrates the endurance, and/or tolerance for 3 hours of therapy each day, with a treatment frequency of 5-7x/wk. Please see assessment section for further patient specific details. If patient discharges prior to next session this note will serve as a discharge summary. Please see below for the latest assessment towards goals. OT Equipment Recommendations  Equipment Needed: No  Other: defer to next level of care - pt has shower chair and RW    Assessment   Performance deficits / Impairments: Decreased functional mobility ; Decreased ADL status; Decreased strength;Decreased safe awareness;Decreased cognition;Decreased endurance;Decreased balance;Decreased vision/visual deficit; Decreased high-level IADLs;Decreased coordination  Assessment: pt presents with below deficits, significantly below baseline function and would benefit from continued therapy to address these deficits  Treatment Diagnosis: decreased ADL/mobility/activity tolerance  Prognosis: Fair  Decision Making: Medium Complexity  Assistance / Modification: 2 person  OT Education: OT Role;Plan of Care;Transfer Training;Orientation  Patient Education: pt is not an independent learner at this time - continue to educate to reinforce carryover  Barriers to Learning: cognition  REQUIRES OT FOLLOW UP: Yes  Activity Tolerance  Activity Tolerance: Patient Tolerated treatment well;Treatment limited secondary to decreased cognition  Safety Devices  Safety Devices in place: Yes  Type of devices: All fall risk precautions in place;Gait belt;Patient at risk for falls;Call light within reach; Chair alarm in place; Left in chair;Nurse notified  Restraints  Initially in place: No         Patient Diagnosis(es): The primary encounter diagnosis was Pneumonia due to infectious organism, unspecified laterality, unspecified part of lung. Diagnoses of Respiratory failure with hypoxia, unspecified chronicity ([address] and Encephalopathy were also pertinent to this visit. has a past medical history of Blind in both eyes, Cerebral artery occlusion with cerebral infarction ([address] CHF (congestive heart failure) ([address] Chronic kidney disease, Depression, Diabetes mellitus out of control ([address] Diabetes mellitus, type II ([address] Diabetic neuropathy associated with type 2 diabetes mellitus ([address] Generalized headaches, Hypertension, Infertility, Insomnia, Migraine headache, Mixed hyperlipidemia, Otitis media, Pelvic abscess in female, Pneumonia, and Stroke ([address] has a past surgical history that includes Cervix surgery; eye surgery; Foot surgery (Right); Foot surgery (Bilateral); Foot surgery (Left); and IR TUNNELED CVC PLACE WO SQ PORT/PUMP > 5 YEARS (9/7/2021).     Restrictions  Restrictions/Precautions  Restrictions/Precautions: General Precautions, Fall Risk  Position Activity Restriction  Other position/activity restrictions: 45yo admitted through the ER on 8/27 with respiratory distress - intubated - extubated 9/4 - history of DM, CHF, CVA with R sided weakness 2020 - initiated dialysis with patient during this stay with tunnel cath placement 9/7    Subjective   General  Chart Reviewed: Yes  Patient assessed for rehabilitation services?: Yes  Additional Pertinent Hx: CVA, DM, CHF, dialysis  Response to previous treatment: Patient with no complaints from previous session  Family / Caregiver Present: No  Diagnosis: acute respiratory failure  Subjective  Subjective: Patient supine in bed upon arrival, agreeable to therapy treatment session  Pain Assessment  Pain Assessment: 0-10  Pain Level: 0  Patient's Stated Pain Goal: No pain  Vital Signs  Patient Currently in Pain: No   Orientation  Orientation  Overall Orientation Status: Impaired  Orientation Level: Oriented to person;Oriented to place;Oriented to situation;Oriented to time    Objective    ADL  Grooming: Maximum assistance; Increased time to complete (maxA for grooming tasks, attempting to brush out hair and put up hair)  Additional Comments: Patient declined any further ADLs. Balance  Sitting Balance: Moderate assistance  Standing Balance: Moderate assistance (modA of 2)  Standing Balance  Time: ~30 seconds, sat EOB ~10 minutes  Activity: Functional transfer  Comment: Patient with strong L lateral lean and posterior lean  Bed mobility  Supine to Sit: 2 Person assistance;Maximum assistance  Scooting: Moderate assistance  Comment: Patient with HOB elevated, increased time needed to complete bed mobility. Patient with minimal verbal cueing needed for redirection and participation of functional activities. Transfers  Stand Step Transfers: Moderate assistance;2 Person assistance (modA of 2)  Transfer Comments: Patient with strong posterior lean, patient with moderate verbal cueing needed for redirection to functional activities and sequencing. Vision  Patient Visual Report: No visual complaint reported. Cognition  Overall Cognitive Status: Exceptions  Arousal/Alertness: Delayed responses to stimuli;Inconsistent responses to stimuli  Following Commands: Follows one step commands with increased time  Attention Span: Attends with cues to redirect; Difficulty dividing attention  Memory: Decreased recall of recent events;Decreased short term memory  Safety Judgement: Decreased awareness of need for assistance;Decreased awareness of need for safety  Problem Solving: Assistance required to generate solutions;Assistance required to implement solutions  Insights: Decreased awareness of deficits  Initiation: Requires cues for some  Sequencing: Requires cues for some     Perception  Overall Perceptual Status: Impaired  Unilateral Attention: Cues to attend to left side of body;Cues to maintain midline in standing;Cues to maintain midline in sitting  Initiation: Cues to initiate tasks  Motor Planning: Hand over hand to sequence tasks  Perseveration: Perseverates during ADLs      Plan   Plan  Times per week: 3-5x per week  Times per day: Daily  Specific instructions for Next Treatment: continue cotx to maximize safe performance with mobility  Current Treatment Recommendations: Strengthening, ROM, Balance Training, Functional Mobility Training, Endurance Training, Safety Education & Training, Self-Care / ADL, Positioning, Cognitive Reorientation, Patient/Caregiver Education & Training, Equipment Evaluation, Education, & procurement    AM-PAC Score   AM-[facility] Inpatient Daily Activity Raw Score: 11 (09/09/21 1342)  -PAC Inpatient ADL T-Scale Score : 29.04 (09/09/21 1342)  ADL Inpatient CMS 0-100% Score: 70.42 (09/09/21 1342)  ADL Inpatient CMS G-Code Modifier : CL (09/09/21 1342)    Goals  Short term goals  Time Frame for Short term goals: discharge  Short term goal 1: mod A of 1 functional transfers - modA of 2 9/9  Short term goal 2: mod A of 1 functional mobility with AAE - ongoing 9/9  Short term goal 3: min A UB ADLs - ongoing 9/9  Short term goal 4: mod A LB ADLs - ongoing 9/9  Short term goal 5: pt will tolerate standing 5 minutes for ADL activities - ~30 seconds 9/9  Long term goals  Time Frame for Long term goals : LTG=STG  Patient Goals   Patient goals : \"to get stronger\"       Therapy Time   Individual Concurrent Group Co-treatment   Time In       1233   Time Out       1250   Minutes       17        Timed Code Treatment Minutes:   17 minutes     Total Treatment Minutes:  17 minutes       Ben Stevenson OTR/L [id number]

## 2021-09-09 NOTE — PROGRESS NOTES
[address]   Cardiology Progress Note     Date: 9/9/2021  Admit Date: 8/27/2021     Reason for consultation: elevated troponin     Chief Complaint:   Chief Complaint   Patient presents with    Respiratory Distress     Pt brought in [facility] after call from  for resp distress. O2 sat 70's, BMV in route. Pt will open eyes to voice. Pupils 8mm and fixed. History of Present Illness: History obtained from patient and medical record. Ki Coreas is a 55 y.o. female presented to the hospital with complaints of respiratory distress. She had sudden onset of SOB and came to the ED a few hours later. She was intubated for hypoxia sO2 60% in the ED. Troponin, bnp, creatinine, Alk phos are all elevated. Lactate normal. CT scan shows pulmonary edema and PNA. Interval Hx: Today, she reports \"I guess I feel ok\". No complaints of chest pain or SOB. Right radial procedure site c/d/I. No hematoma. Mild bruising around radial insertion site. Good pulses, color, warmth, and sensation to that extremity. Patient seen and examined. Clinical notes reviewed. Telemetry reviewed. No new complaints today. No major events overnight. Denies having chest pain, palpitations, shortness of breath, orthopnea/PND, cough, or dizziness at the time of this visit.       Past Medical History:  Past Medical History:   Diagnosis Date    Blind in both eyes     Cerebral artery occlusion with cerebral infarction ([address]     CHF (congestive heart failure) (HCC)     Chronic kidney disease     Depression     Diabetes mellitus out of control ([address]     Diabetes mellitus, type II ([address]     2005    Diabetic neuropathy associated with type 2 diabetes mellitus ([address]     Generalized headaches     Hypertension     Infertility     Insomnia     chronic vs lack of time spent to sleep    Migraine headache 11/09/2011    Mixed hyperlipidemia     Otitis media     h/o recurrent    Pelvic abscess in female 10/05/2013  Pneumonia     2004 approx.  Stroke [facility]) 08/27/2020        Past Surgical History:    has a past surgical history that includes Cervix surgery; eye surgery; Foot surgery (Right); Foot surgery (Bilateral); Foot surgery (Left); and IR TUNNELED CVC PLACE WO SQ PORT/PUMP > 5 YEARS (9/7/2021). Social History:  Reviewed. reports that she has been smoking cigarettes. She has been smoking about 1.00 pack per day. She has never used smokeless tobacco. She reports that she does not drink alcohol and does not use drugs. Allergies: Allergies   Allergen Reactions    Amoxicillin Itching and Hives     Tolerates cephalosporins  Patient tolerating cefazolin (ANCEF) as of October 11, 2018  Patient tolerating cefazolin (ANCEF) as of October 11, 2018  Tolerates cephalosporins  Patient tolerating cefazolin (ANCEF) as of October 11, 2018    Levofloxacin Anaphylaxis    Vancomycin Shortness Of Breath, Hives and Anaphylaxis    Tape [Adhesive Tape] Other (See Comments)     Paper tape turns skin bright red. Plastic tape okay. Family History:  Reviewed. family history includes Alcohol Abuse in her maternal grandfather and paternal grandfather; Cecy Meres in her father; Diabetes in her father, mother, paternal aunt, paternal grandmother, paternal uncle, and sister; High Blood Pressure in her father; Other (age of onset: 79) in her mother. Denies family history of sudden cardiac death, arrhythmia, premature CAD    Home Meds:  Prior to Visit Medications    Medication Sig Taking?  Authorizing Provider   Dulaglutide 0.75 MG/0.5ML SOPN Inject 0.75 mg into the skin once a week  Damon Mireles   ibuprofen (ADVIL;MOTRIN) 200 MG CAPS Take 1 capsule by mouth  Historical Provider, MD   furosemide (LASIX) 80 MG tablet Take 80 mg by mouth every morning 80mg in the morning 40mg in the evening  Historical Provider, MD   amLODIPine (NORVASC) 10 MG tablet Take 1 tablet by mouth daily  Damon Mireles   furosemide (LASIX) 40 MG tablet Take 1 tablet by mouth every evening  Damon Mireles   spironolactone (ALDACTONE) 50 MG tablet Take 1 tablet by mouth daily  Damon Mireles   insulin glargine (LANTUS;BASAGLAR) 100 UNIT/ML injection pen Inject 30 Units into the skin daily  Damon Mireles   lisinopril (PRINIVIL;ZESTRIL) 40 MG tablet Take 1 tablet by mouth daily  Damon Mireles   atorvastatin (LIPITOR) 40 MG tablet Take 1 tablet by mouth nightly  Damon Mireles   Insulin Pen Needle 29G X 12.7MM MISC 1 each by Does not apply route daily  Damon Mireles   propranolol (INDERAL LA) 80 MG extended release capsule Take 1 capsule by mouth every morning  Damon Mireles   LORazepam (ATIVAN) 0.5 MG tablet Take 0.5 mg by mouth 2 times daily as needed for Anxiety. Historical Provider, MD   aspirin 81 MG EC tablet Take 1 tablet by mouth daily  Alfreida Siemens, MD   pregabalin (LYRICA) 75 MG capsule Take 1 capsule by mouth nightly for 7 days. Alfreida Siemens, MD   sertraline (ZOLOFT) 50 MG tablet Take 1 tablet by mouth daily  Alfreida Siemens, MD   glucose monitoring kit (FREESTYLE) monitoring kit 1 kit by Does not apply route daily  Khari Vieira MD   insulin lispro, 1 Unit Dial, 100 UNIT/ML SOPN Inject 0-6 Units into the skin 3 times daily (with meals) **Corrective Low Dose Algorithm**  Glucose: Dose:               No Insulin  140-199 1 Unit  200-249 2 Units  250-299 3 Units  300-349 4 Units  350-399 5 Units  Over 399 6 Units  Patient taking differently: Inject 6-12 Units into the skin 3 times daily (with meals) **Corrective Low Dose Algorithm**  Glucose: Dose:               No Insulin  140-199 1 Unit  200-249 2 Units  250-299 3 Units  300-349 4 Units  350-399 5 Units  Over 399 6 Units  Kaelyn Raza MD   glucose blood VI test strips (VICTORY AGM-4000 TEST) strip Test four times daily until controlled and then three times daily.   Code 250.02  Roberto Skelton MD        Scheduled Meds:   miconazole [P.O.:240; I.V.:20]  Out: 210    Wt Readings from Last 3 Encounters:   09/09/21 185 lb 6.5 oz (84.1 kg)   07/08/21 244 lb 11.2 oz (111 kg)   02/26/21 237 lb 3.2 oz (107.6 kg)       Intake/Output Summary (Last 24 hours) at 9/9/2021 1558  Last data filed at 9/9/2021 0825  Gross per 24 hour   Intake 360 ml   Output 240 ml   Net 120 ml       Telemetry: Personally Reviewed  - Sinus rhythm   · Constitutional: Cooperative and in no apparent distress, and appears well nourished   · Skin: Warm and pink. appears pale. no cyanosis, clubbing, or bruising. Cath site stable. · HEENT: Symmetric and normocephalic. PERRL. Conjunctiva pink with clear sclera. Mucus membranes moist.   · Cardiovascular: Regular rate and rhythm. S1/S2 present without murmurs, no rubs or gallops. Peripheral pulses 2+, capillary refill < 3 seconds. No elevation of JVP. No peripheral edema  · Respiratory: Respirations symmetric and unlabored. Lungs clear to auscultation bilaterally, no wheezing, crackles, or rhonchi  · Gastrointestinal: Abdomen soft and round. Bowel sounds active without tenderness. · Musculoskeletal: Bilateral upper and lower extremity strength with generalized weakness   · Neurologic/Psych: Awake and orientated to person, place, time. Calm affect. Pertinent labs, diagnostic, device, and imaging results reviewed as a part of this visit    Labs:    BMP:   Recent Labs     09/07/21  0335 09/08/21  0353 09/09/21  0550    140 135*   K 3.9 4.0 3.8    109 103   CO2 16* 16* 17*   PHOS 3.2 4.7 3.3   BUN 26* 37* 24*   CREATININE 3.4* 4.6* 3.7*   MG 2.00 2.30 2.00     Estimated Creatinine Clearance: 21 mL/min (A) (based on SCr of 3.7 mg/dL (H)).    CBC:   Recent Labs     09/07/21  0335 09/08/21  0353 09/09/21  0550   WBC 15.8* 15.0* 20.2*   HGB 8.9* 9.3* 10.2*   HCT 27.6* 28.6* 30.9*   MCV 87.1 87.2 86.8    295 324     Thyroid: No results found for: TSH, N7XTSHP, Q4IMHEM, THYROIDAB  Lipids:   Lab Results   Component Value Date    CHOL 164 2021    HDL 41 2021    HDL 34 2011    TRIG 319 2021     LFTS:   Lab Results   Component Value Date    ALT <5 2021    AST 11 2021    ALKPHOS 117 2021    PROT 6.4 2021    PROT 6.8 2011    AGRATIO 0.8 2021    BILITOT <0.2 2021     Cardiac Enzymes:   Lab Results   Component Value Date    CKTOTAL 46 2021    TROPONINI 0.23 2021    TROPONINI 0.22 2021    TROPONINI 0.24 2021     Coags:   Lab Results   Component Value Date    PROTIME 11.3 2021    INR 1.00 2021       EC21   Normal sinus rhythm  Nonspecific T wave abnormality  Prolonged QT  Abnormal ECG    Echo: 20  Summary    *Left ventricle - moderate concentric LVH, normal size and function with EF of 55%   *Mitral valve - mild regurgitation   *Tricuspid valve - trivial regurgitation   *Bubble study - negative    ECHO:  21  Summary   Left ventricular systolic function is normal with ejection fraction   estimated at 55-60 %. No regional wall motion abnormalities are noted. There is mild left ventricular hypertrophy. Normal left ventricular diastolic filling pressure. Moderate mitral regurgitation. Mild pulmonic regurgitation present. IVC size is dilated (>2.1 cm) but collapses > 50% with respiration   consistent with elevated RA pressure (8 mmHg). **There is a small-moderate bilateral pleural effusion. **There is a small circumferential pericardial effusion noted. Previous echo done  - EF 55%    CT chest: 21  CT CHEST IMPRESSION:   1.   1. Multifocal airspace disease that likely represents a combination of   aspiration/pneumonia and pulmonary edema. 2. Other findings as described.      Cardiac Cath LV21  Anatomy:   LM-nml   LAD-nml  Cx-nml  OM- nml  RCA-mid 70%  RPDA- nml  LVEF- 65  LVG- nml  LVEDP- 14     Intervention  FFR RCA 0.90     Contrast: 69  Flouro Time: 5.4  Access: R radial a     Impression  ~Coronary Angiography w/ nonobstructive CAD  ~LVG with LVEF of 60 and no regional wall motion abnormalities    Recommendation  ~Aggressive medical treatment and risk factor modification  ~1. Medications reviewed  2. Stop heparin gtt. Post cath IVF. Bedrest.  3. Recommend statin  4. Patient has been advised on the importance of regular exercise of at least 20-30 minutes daily alternating with aerobic and isometric activities. 5. Patient counseled about and offered assistance for smoking cessation   6. No indication for cardiac rehab    Problem List:   Patient Active Problem List    Diagnosis Date Noted    Acute respiratory failure ([address] 08/27/2021    Diabetic peripheral neuropathy ([facility] 02/24/2021    Acute on chronic diastolic heart failure ([address] 02/23/2021    Isolated proteinuria 12/04/2020    Panic disorder without agoraphobia     History of CVA (cerebrovascular accident)     Arterial ischemic stroke, ICA, left, acute ([address]     HTN (hypertension), benign     DM (diabetes mellitus), secondary, uncontrolled, w/neurologic complic ([address]     Dyslipidemia     Smoker     Right sided numbness 08/27/2020    Tobacco dependence 08/27/2020    Chronic kidney disease (CKD), stage III (moderate) (HCC) 08/27/2020    Chronic diastolic (congestive) heart failure ([address] 08/27/2020    Nephrotic syndrome 04/25/2020    Peripheral edema 04/25/2020    Pulmonary edema 04/25/2020    Osteomyelitis of left foot ([address] 04/24/2019    Pyogenic inflammation of bone ([address]     History of medication noncompliance     Diabetic foot infection ([address] 04/21/2019    Anemia 10/05/2013    Migraine headache 11/09/2011    Type 2 diabetes mellitus, with long-term current use of insulin (HCC)     Mixed hyperlipidemia         Assessment and Plan:     Acute hypoxic respiratory failure   ~ severely elevated proBNP on admit. LV function essentially normal. Mild diastolic dysfunction.   ~ required intubation, now extubated and on RA   ~ Diuresed per nephrology     Elevated troponin   ~ likely from KINZA or respiratory failure   ~ [facility] with nonobstructive CAD     Coronary artery disease   ~ [facility] with nonobstructive 70% mid RCA CAD. FFR RCA 0.90.  ~ continue statin. Start aspirin 81mg daily. Acute diastolic CHF   ~ Echo 9/52/35: EF 55-60%  ~ on lasix 40mg daily PO per nephrology dosing in addition to HD/ UF  ~ down 45lbs since admit   ~ CHF nurse consult     DM  ~ longstanding    Hypertension   ~ stable    KINZA on CKD  ~ started on HD 8/31/21  ~ per nephrology      Pt stable from a cardiac standpoint. Follow up in office in 1 week after discharge. Call with further questions or concerns. Multiple medical conditions with risk of decompensation. All pertinent information and plan of care discussed with the physician. All questions and concerns were addressed to the patient. Alternatives to my treatment were discussed. I have discussed the above stated plan with patient and the nurse. The patient verbalized understanding and agreed with the plan. Thank you for allowing to us to participate in the care of Clayton Hdz. Total visit time > 35 minutes; > 50% spend counseling / coordinating care. I reviewed interval history, physical exam, review of data including labs, imaging, development and implementation of treatment plan and coordination of complex care. Counseled on risk factor modifications. Kelli STAFFORD-CNP  [address]   Office: [phone]    NOTE:  This report was transcribed using voice recognition software. Every effort was made to ensure accuracy; however, inadvertent computerized transcription errors may be present.

## 2021-09-09 NOTE — CARE COORDINATION
CM met with patient and  about discharge planning, they are aware that the insurance can delay getting an outpatient dialysis chair (appointment) for up to a week but that CM and dialysis SW are working on [address]. Discussed placement in skilled facility for rehab, patient is refusing placement. ,  Spouse will not force her to go. A home care listing has been provided to them.     RAHEEL OlmedoN, CCM, RN  [facility]  [phone]

## 2021-09-09 NOTE — PROGRESS NOTES
[x]Mild   [] Moderate  []Severe  []To be assessed   []Apraxia   [x]Dysarthria   []Decreased Intelligibility    VOICE  Voice: []WFL [x]Mild   [] Moderate  []Severe  []To be assessed  Breath Support  Dysphonic     COGNITION  []Unable to be assessed secondary to Aphasia     Overall Orientation : []WFL [x]Mild   [] Moderate  []Severe  []To be assessed   []Unable to be assessed secondary to Aphasia   Disoriented to time   Disoriented to situation   Oriented to self  Oriented to place    Attention: []WFL [x]Mild   [] Moderate  []Severe  []To be assessed  Impaired Selective Attention    Memory: []WFL []Mild   [x] Moderate  []Severe  []To be assessed  Impaired Short-term Memory  Impaired Recall of New Learning   Impaired Immediate/working Memory  Impaired Long-term Memory    Problem Solving: []WFL []Mild   [] Moderate  []Severe  [x]To be assessed    Safety/Judgement: []WFL [x]Mild   [] Moderate  []Severe  []To be assessed  Unable to self-monitor and self correct consistently   Impaired Insight  Impaired Flexibility of thought    GOALS:  Short Term Speech/Language/Cognitive Goals:   1. Pt will improve auditory processing/comprehension of commands and questions to 80%, via graded tasks   2. Pt will improve orientation and short term recall via graded tasks to 80%  3. Pt will improve verbal expression for functional expression via graded tasks to 80%  4. Pt will participate in ongoing cognitive assessment with goals to be established as indicated      Plan:  Continued daily Dysphagia treatment with goals per plan of care. Patient/Family Education:Education given to the Pt and nurse, who verbalized understanding    Discharge Recommendations:  Pt will benefit from continued skilled Speech Therapy for Dysphagia services, prior to returning home. Timed Code Treatment:  0 minutes     Total Treatment Time:  25 minutes     If patient discharges prior to next session this note will serve as a discharge summary.      Ramakrishna Sesay Rigo Ballesteros, [address] [facility]  Speech Language Pathologist

## 2021-09-09 NOTE — PROGRESS NOTES
Pt calmed down. Now sleeping in chair. New IV placed in left forearm. Chair alarm on, door open, pt able to be visualized in room from nurses station.

## 2021-09-09 NOTE — PLAN OF CARE
Problem: Cardiovascular  Goal: Hemodynamic stability  Outcome: Ongoing     Problem: Nutrition  Goal: Optimal nutrition therapy  Outcome: Ongoing     Problem: Discharge Planning:  Goal: Participates in care planning  Description: Participates in care planning  Outcome: Ongoing  Goal: Discharged to appropriate level of care  Description: Discharged to appropriate level of care  Outcome: Ongoing  Goal: Ability to perform activities of daily living will improve  Description: Ability to perform activities of daily living will improve  Outcome: Ongoing     Problem: Airway Clearance - Ineffective:  Goal: Ability to maintain a clear airway will improve  Description: Ability to maintain a clear airway will improve  Outcome: Ongoing     Problem: Anxiety/Stress:  Goal: Level of anxiety will decrease  Description: Level of anxiety will decrease  Outcome: Ongoing     Problem: Aspiration:  Goal: Absence of aspiration  Description: Absence of aspiration  Outcome: Ongoing     Problem:  Bowel Function - Altered:  Goal: Bowel elimination is within specified parameters  Description: Bowel elimination is within specified parameters  Outcome: Ongoing     Problem: Cardiac Output - Decreased:  Goal: Hemodynamic stability will improve  Description: Hemodynamic stability will improve  Outcome: Ongoing     Problem: Fluid Volume - Imbalance:  Goal: Absence of imbalanced fluid volume signs and symptoms  Description: Absence of imbalanced fluid volume signs and symptoms  Outcome: Ongoing     Problem: Gas Exchange - Impaired:  Goal: Levels of oxygenation will improve  Description: Levels of oxygenation will improve  Outcome: Ongoing     Problem: Mental Status - Impaired:  Goal: Mental status will be restored to baseline  Description: Mental status will be restored to baseline  Outcome: Ongoing     Problem: Nutrition Deficit:  Goal: Ability to achieve adequate nutritional intake will improve  Description: Ability to achieve adequate nutritional intake will improve  Outcome: Ongoing     Problem: Pain:  Goal: Pain level will decrease  Description: Pain level will decrease  Outcome: Ongoing  Goal: Recognizes and communicates pain  Description: Recognizes and communicates pain  Outcome: Ongoing  Goal: Control of acute pain  Description: Control of acute pain  Outcome: Ongoing  Goal: Control of chronic pain  Description: Control of chronic pain  Outcome: Ongoing     Problem: Serum Glucose Level - Abnormal:  Goal: Ability to maintain appropriate glucose levels will improve to within specified parameters  Description: Ability to maintain appropriate glucose levels will improve to within specified parameters  Outcome: Ongoing     Problem: Skin Integrity - Impaired:  Goal: Will show no infection signs and symptoms  Description: Will show no infection signs and symptoms  Outcome: Ongoing  Goal: Absence of new skin breakdown  Description: Absence of new skin breakdown  Outcome: Ongoing     Problem: Sleep Pattern Disturbance:  Goal: Appears well-rested  Description: Appears well-rested  Outcome: Ongoing     Problem: Tissue Perfusion - Cardiopulmonary, Altered:  Goal: Absence of angina  Description: Absence of angina  Outcome: Ongoing  Goal: Hemodynamic stability will improve  Description: Hemodynamic stability will improve  Outcome: Ongoing     Problem: Falls - Risk of:  Goal: Will remain free from falls  Description: Will remain free from falls  Outcome: Ongoing  Goal: Absence of physical injury  Description: Absence of physical injury  Outcome: Ongoing     Problem: Skin Integrity:  Goal: Will show no infection signs and symptoms  Description: Will show no infection signs and symptoms  Outcome: Ongoing  Goal: Absence of new skin breakdown  Description: Absence of new skin breakdown  Outcome: Ongoing     Problem: Confusion - Acute:  Goal: Mental status will be restored to baseline  Description: Mental status will be restored to baseline  Outcome: Ongoing  Goal: Absence of continued neurological deterioration signs and symptoms  Description: Absence of continued neurological deterioration signs and symptoms  Outcome: Ongoing     Problem: Injury - Risk of, Physical Injury:  Goal: Will remain free from falls  Description: Will remain free from falls  Outcome: Ongoing  Goal: Absence of physical injury  Description: Absence of physical injury  Outcome: Ongoing     Problem: Mood - Altered:  Goal: Mood stable  Description: Mood stable  Outcome: Ongoing  Goal: Absence of abusive behavior  Description: Absence of abusive behavior  Outcome: Ongoing  Goal: Verbalizations of feeling emotionally comfortable while being cared for will increase  Description: Verbalizations of feeling emotionally comfortable while being cared for will increase  Outcome: Ongoing     Problem: Psychomotor Activity - Altered:  Goal: Absence of psychomotor disturbance signs and symptoms  Description: Absence of psychomotor disturbance signs and symptoms  Outcome: Ongoing     Problem: Sensory Perception - Impaired:  Goal: Demonstrations of improved sensory functioning will increase  Description: Demonstrations of improved sensory functioning will increase  Outcome: Ongoing  Goal: Decrease in sensory misperception frequency  Description: Decrease in sensory misperception frequency  Outcome: Ongoing  Goal: Able to refrain from responding to false sensory perceptions  Description: Able to refrain from responding to false sensory perceptions  Outcome: Ongoing  Goal: Demonstrates accurate environmental perceptions  Description: Demonstrates accurate environmental perceptions  Outcome: Ongoing  Goal: Able to distinguish between reality-based and nonreality-based thinking  Description: Able to distinguish between reality-based and nonreality-based thinking  Outcome: Ongoing  Goal: Able to interrupt nonreality-based thinking  Description: Able to interrupt nonreality-based thinking  Outcome: Ongoing

## 2021-09-09 NOTE — PROGRESS NOTES
MD Gallito Dumont MD Butler Roller, MD               Office: [phone]                      Fax: [phone]             2 [facility]                   NEPHROLOGY CVU INPATIENT PROGRESS NOTE:     PATIENT NAME: Ignacia Johnson  : 1975  MRN: [id number]     Nephrology treatment plan update. Cardiac cath done yesterday, non-obstructive CAD. EF-60%  TDC placed   Outpt placement at [facility] pending  - HD tomorrow. Around EDW. - switched to po Lasix, decreased to 40mg daily. Now appearing euvolemic.    - RIA IV with HD MWF  - follow outpt HD placement    -Patient has advanced background of chronic kidney disease stage IV from diabetic nephropathy.  -Follows with me.   -Likely progression of kidney disease. Medications reviewed. Hold Ace inhibitors    Anemia-hgb better. RIA with HD. Poor nutritional status.  -Albumin is 2.5. Discussed with RN. RECOMMENDATIONS:   HD tomorrow  Outpt HD placement pending  Switched to po Lasix. D/C plan from renal stand point:  Once outpt HD unit available. IMPRESSION:       Admitted for:  Acute respiratory failure ([facility] [address] [J96.00]  Encephalopathy [G93.40]  Respiratory failure with hypoxia, unspecified chronicity ([address] [J96.91]  Pneumonia due to infectious organism, unspecified laterality, unspecified part of lung [J18.9]      KINZA (on proteinuric CKD: 4): Worsening.   Started on HD.   - BL Scr-last known 2.5, in 2021,   ---> 4.6 on admission  -: Etiology of KINZA -presumed ATN in the setting of pneumonia, unclear if it this is advancing CKD    -appreciated assistance by Dr Ronald Hussein for Rt IJ non-TDC placement ()   -Hemodialysis #1 2021      History of nephrotic range proteinuria,   - thought to be from diabetic nephropathy With CKD stage III -> so high risk of advanced CKD,  -Follows with me  -Noncompliance, last follow-up was 2021 then lost for follow-up  -echo results -  IVC size is dilated (>2.1 cm) but collapses > 50% with respiration consistent with elevated RA pressure (8 mmHg). -Last echo-8/2020  moderate concentric LVH, normal size and function with EF of 31%, normal diastolic function      Associated problems:   Hypokalemia-needing repletion. Better. Acidosis, non-anion gap-HD today  Hypomagnesemia-better  Hypophosphatemia-better  Anemia, chronic disease-RIA with HD      Other major problems: Management per primary and other consulting teams.   //Acute hypoxic respiratory failure -needing intubation. Extubated  // Multifocal airspace disease that likely represents a combination of aspiration/pneumonia and pulmonary edema  //Fluid overload  -COVID was ruled out  //History of uncontrolled diabetes last A1c was 11.3    // Hypertension un-controlled,       Hospital Problems         Last Modified POA    Pulmonary edema 8/28/2021 Yes    Chronic kidney disease (CKD), stage III (moderate) (HCC) 8/28/2021 Yes    Chronic diastolic (congestive) heart failure ([address] [address] 8/28/2021 Yes    Acute on chronic diastolic heart failure ([facility] [address] 8/28/2021 Yes    Acute respiratory failure ([facility] [address] 8/27/2021 Yes           Thank you for allowing me to participate in this patient's care. Please do not hesitate to contact me anytime. We will follow along with you. Haylie Sánchez MD,  [facility] [address] [place] [address] [place]: [phone] or [email or web address]  Fax: [phone]     Severally ill, at risk of impending organ failure needing ICU higher level of care/monitoring.    Time spent  35 minutes that included face-to-face meeting/discussion with patient's treatment team (including primary/referring team and other consultants; included coordination of care with the treatment team; and review of patient's electronic medical records and ordering appropriates tests.         ========================================================   ======================================================== Subjective:   No complaints today  Neg 18L since admission      Past medical, Surgical, Social, Family medical history reviewed by me. MEDICATIONS: reviewed by me. Medications Prior to Admission:  No current facility-administered medications on file prior to encounter. Current Outpatient Medications on File Prior to Encounter   Medication Sig Dispense Refill    Dulaglutide 0.75 MG/0.5ML SOPN Inject 0.75 mg into the skin once a week 4 pen 1    ibuprofen (ADVIL;MOTRIN) 200 MG CAPS Take 1 capsule by mouth      furosemide (LASIX) 80 MG tablet Take 80 mg by mouth every morning 80mg in the morning 40mg in the evening      amLODIPine (NORVASC) 10 MG tablet Take 1 tablet by mouth daily 30 tablet 1    furosemide (LASIX) 40 MG tablet Take 1 tablet by mouth every evening 30 tablet 1    spironolactone (ALDACTONE) 50 MG tablet Take 1 tablet by mouth daily 30 tablet 2    insulin glargine (LANTUS;BASAGLAR) 100 UNIT/ML injection pen Inject 30 Units into the skin daily 4 pen 2    lisinopril (PRINIVIL;ZESTRIL) 40 MG tablet Take 1 tablet by mouth daily 30 tablet 2    atorvastatin (LIPITOR) 40 MG tablet Take 1 tablet by mouth nightly 30 tablet 3    Insulin Pen Needle 29G X 12.7MM MISC 1 each by Does not apply route daily 100 each 5    propranolol (INDERAL LA) 80 MG extended release capsule Take 1 capsule by mouth every morning 30 capsule 2    LORazepam (ATIVAN) 0.5 MG tablet Take 0.5 mg by mouth 2 times daily as needed for Anxiety.  aspirin 81 MG EC tablet Take 1 tablet by mouth daily 30 tablet 3    pregabalin (LYRICA) 75 MG capsule Take 1 capsule by mouth nightly for 7 days.  7 capsule 0    sertraline (ZOLOFT) 50 MG tablet Take 1 tablet by mouth daily 30 tablet 3    glucose monitoring kit (FREESTYLE) monitoring kit 1 kit by Does not apply route daily 1 kit 0    insulin lispro, 1 Unit Dial, 100 UNIT/ML SOPN Inject 0-6 Units into the skin 3 times daily (with meals) **Corrective Low Dose Algorithm**  Glucose: Dose:               No Insulin  140-199 1 Unit  200-249 2 Units  250-299 3 Units  300-349 4 Units  350-399 5 Units  Over 399 6 Units (Patient taking differently: Inject 6-12 Units into the skin 3 times daily (with meals) **Corrective Low Dose Algorithm**  Glucose: Dose:               No Insulin  140-199 1 Unit  200-249 2 Units  250-299 3 Units  300-349 4 Units  350-399 5 Units  Over 399 6 Units) 3 pen 0    glucose blood VI test strips (VICTORY AGM-4000 TEST) strip Test four times daily until controlled and then three times daily.   Code 250.02  ONE TOUCH ULTRA MONITOR 100 strip 12         Current Facility-Administered Medications:     heparin (porcine) injection 3,600 Units, 3,600 Units, IntraCATHeter, PRN, Dillon Marcos MD, 3,600 Units at 09/08/21 1000    furosemide (LASIX) tablet 40 mg, 40 mg, Oral, Daily, Dillon Marcos MD, 40 mg at 09/09/21 6841    sodium chloride flush 0.9 % injection 5-40 mL, 5-40 mL, IntraVENous, 2 times per day, Kendrick Obregon MD, 10 mL at 09/09/21 0825    sodium chloride flush 0.9 % injection 5-40 mL, 5-40 mL, IntraVENous, PRN, Kendrick Obregon MD, 10 mL at 09/09/21 0825    0.9 % sodium chloride infusion, 25 mL, IntraVENous, PRN, Kendrick Obregon MD    acetaminophen (TYLENOL) tablet 650 mg, 650 mg, Oral, Q4H PRN, Kendrick Obregon MD    oxyCODONE-acetaminophen (PERCOCET) 5-325 MG per tablet 1 tablet, 1 tablet, Oral, Q4H PRN, 1 tablet at 09/09/21 0231 **OR** oxyCODONE-acetaminophen (PERCOCET) 5-325 MG per tablet 2 tablet, 2 tablet, Oral, Q4H PRN, Kendrick Obregon MD    amLODIPine (NORVASC) tablet 10 mg, 10 mg, Oral, Daily, Holden Pittman MD, 10 mg at 09/09/21 0824    atorvastatin (LIPITOR) tablet 40 mg, 40 mg, Oral, Nightly, Holden Pittman MD, 40 mg at 09/08/21 2052    sertraline (ZOLOFT) tablet 50 mg, 50 mg, Oral, Daily, Holden Pittman MD, 50 mg at 09/09/21 0826    propranolol (INDERAL LA) extended release capsule 80 mg, 80 mg, Oral, Genesis Lawrence MD, 80 mg at 09/09/21 0823    epoetin yohana (EPOGEN;PROCRIT) injection 10,000 Units, 10,000 Units, IntraVENous, Q MWF, Dillon Garcia MD, 10,000 Units at 09/08/21 0900    hydrALAZINE (APRESOLINE) injection 20 mg, 20 mg, IntraVENous, Q4H PRN, Colleen Gore MD, 20 mg at 09/08/21 2335    0.9 % sodium chloride infusion, , IntraVENous, PRN, Jannet Leggett MD    sodium chloride flush 0.9 % injection 5-40 mL, 5-40 mL, IntraVENous, Q12H, Jack Nino MD, 10 mL at 09/08/21 2300    sodium chloride flush 0.9 % injection 5-40 mL, 5-40 mL, IntraVENous, PRN, Jack Nino MD    fluticasone (FLONASE) 50 MCG/ACT nasal spray 1 spray, 1 spray, Each Nostril, PRN, Annabelle Jose MD    LORazepam (ATIVAN) injection 0.5 mg, 0.5 mg, IntraVENous, Q4H PRN, Manda Marin MD, 0.5 mg at 09/09/21 0232    cloNIDine (CATAPRES) 0.2 MG/24HR 1 patch, 1 patch, TransDERmal, Weekly, Colleen Gore MD, 1 patch at 09/03/21 1145    heparin (porcine) injection 5,000 Units, 5,000 Units, SubCUTAneous, 3 times per day, Quirino Quintana MD, 5,000 Units at 09/09/21 0520    albumin human 25 % IV solution 25 g, 25 g, IntraVENous, PRN, Luis Partida MD, Stopped at 09/01/21 1439    insulin lispro (1 Unit Dial) 0-6 Units, 0-6 Units, SubCUTAneous, Q4H, Brittany Dejesus DO, 1 Units at 09/09/21 0519    glucose (GLUTOSE) 40 % oral gel 15 g, 15 g, Oral, PRN, Brittany Dejesus,     dextrose 50 % IV solution, 12.5 g, IntraVENous, PRN, Brittany Dejesus, DO    glucagon (rDNA) injection 1 mg, 1 mg, IntraMUSCular, PRN, Brittany Dejesus,     dextrose 5 % solution, 100 mL/hr, IntraVENous, PRN, Brittany Dejesus, DO    pantoprazole (PROTONIX) injection 40 mg, 40 mg, IntraVENous, BID, Nicko Tejeda MD, 40 mg at 09/09/21 0825    fentaNYL (SUBLIMAZE) injection 50 mcg, 50 mcg, IntraVENous, Q1H PRN, Edwar Arizmendi MD    sodium chloride flush 0.9 % injection 5-40 mL, 5-40 mL, IntraVENous, 2 times per day, Cristy Villa Oleg, DO, 10 mL at 09/08/21 2048    sodium chloride flush 0.9 % injection 5-40 mL, 5-40 mL, IntraVENous, PRN, Brittany Dejesus DO, 10 mL at 09/05/21 1657    0.9 % sodium chloride infusion, 25 mL, IntraVENous, PRN, Samantha Dejesus, DO, Stopped at 08/29/21 2301    ondansetron (ZOFRAN-ODT) disintegrating tablet 4 mg, 4 mg, Oral, Q8H PRN **OR** ondansetron (ZOFRAN) injection 4 mg, 4 mg, IntraVENous, Q6H PRN, Quentinmad IRA Dejesus, DO    polyethylene glycol (GLYCOLAX) packet 17 g, 17 g, Oral, Daily PRN, Avi Shoulders IRA Dejesus, DO    acetaminophen (TYLENOL) tablet 650 mg, 650 mg, Oral, Q6H PRN **OR** acetaminophen (TYLENOL) suppository 650 mg, 650 mg, Rectal, Q6H PRN, Brittany Dejesus, DO, 650 mg at 09/02/21 1648      REVIEW OF SYSTEMS:     Review of systems not obtained due to patient factors - intubation       PHYSICAL EXAM:  Recent vital signs and recent I/Os reviewed by me.      Wt Readings from Last 3 Encounters:   09/09/21 185 lb 6.5 oz (84.1 kg)   07/08/21 244 lb 11.2 oz (111 kg)   02/26/21 237 lb 3.2 oz (107.6 kg)     BP Readings from Last 3 Encounters:   09/09/21 (!) 157/66   07/08/21 (!) 160/100   02/26/21 133/86     Patient Vitals for the past 24 hrs:   BP Temp Temp src Pulse Resp SpO2 Weight   09/09/21 0823 (!) 157/66 -- -- -- -- -- --   09/09/21 0800 (!) 157/66 97.5 °F (36.4 °C) Temporal 99 18 98 % --   09/09/21 0534 -- -- -- -- -- -- 185 lb 6.5 oz (84.1 kg)   09/09/21 0502 (!) 143/67 97.4 °F (36.3 °C) Temporal 100 16 95 % --   09/09/21 0001 -- 97.8 °F (36.6 °C) Temporal -- 16 -- --   09/09/21 0000 (!) 156/70 -- -- 103 -- -- --   09/08/21 1951 -- 97 °F (36.1 °C) Temporal -- 16 -- --   09/08/21 1950 (!) 172/70 -- -- 99 -- -- --   09/08/21 1800 (!) 166/69 -- -- 99 -- 99 % --   09/08/21 1745 -- -- -- 95 -- 98 % --   09/08/21 1730 (!) 169/73 -- -- 99 -- -- --   09/08/21 1715 -- -- -- 97 -- 98 % --   09/08/21 1700 (!) 190/83 -- -- 100 -- 92 % --   09/08/21 1630 (!) 174/81 -- -- 88 16 97 % --   09/08/21 1615 (!) 149/66 -- -- 87 16 98 % --   09/08/21 1600 (!) 154/68 -- -- 92 16 97 % --   09/08/21 1545 137/68 -- -- 89 16 98 % --   09/08/21 1533 (!) 150/69 97.2 °F (36.2 °C) Temporal 87 16 98 % --   09/08/21 1425 (!) 150/69 96.9 °F (36.1 °C) Temporal 98 16 97 % --   09/08/21 1200 (!) 141/63 97.6 °F (36.4 °C) Temporal 97 18 99 % --       Intake/Output Summary (Last 24 hours) at 9/9/2021 1055  Last data filed at 9/9/2021 0825  Gross per 24 hour   Intake 360 ml   Output 300 ml   Net 60 ml          Constitutional: Poorly responsive, Intubated and  obese habitus  Eyes: Conjunctiva clear and Noscleral icterus  Ear, Nose, and Throat: moist oral mucosa; Neck: Trachea midline,  No jugular venous distension  Cardiovascular: Regular rate and ryhthm, normal S1 and S2,    Respiratory: clear  Abdomen:  distended. Normal bowel sounds. : Meza catheter is inserted, draining urine  Skin: no rash,  bruises on visible body area  Musculoskeletal: No clubbing or cyanosis of digits. and Normocephalic. Extremitties: no peripheral edema, no deformities. Neurologic:Unable to obtain as intubated/sedated. Psychiatric: Unable to obtain as intubated/sedated. DATA:  Diagnostic tests reviewed by me for today's visit:   (AS NEEDED FOR MY EVALUATION AND MANAGEMENT).        Recent Labs     09/07/21 0335 09/08/21  0353 09/09/21  0550   WBC 15.8* 15.0* 20.2*   HCT 27.6* 28.6* 30.9*    295 324     Iron Saturation:  No components found for: PERCENTFE  FERRITIN:    Lab Results   Component Value Date    FERRITIN 112.0 08/28/2021     IRON:    Lab Results   Component Value Date    IRON 22 08/28/2021     TIBC:    Lab Results   Component Value Date    TIBC 184 08/28/2021       Recent Labs     09/07/21  0335 09/08/21  0353 09/09/21  0550    140 135*   K 3.9 4.0 3.8    109 103   CO2 16* 16* 17*   BUN 26* 37* 24*   CREATININE 3.4* 4.6* 3.7*     Recent Labs     09/07/21  0335 09/08/21  0353 09/09/21  0550   CALCIUM 8.5 8.4 8.7   MG 2.00 2.30 2.00   PHOS 3.2 4.7 3.3     No results for input(s): PH, PCO2, PO2 in the last 72 hours.     Invalid input(s): [id number], INSPIREDO2    ABG:  No results found for: PH, PCO2, PO2, HCO3, BE, THGB, TCO2, O2SAT  VBG:    Lab Results   Component Value Date    PHVEN 7.361 09/09/2021    FBO2FEX 34.3 02/23/2021    BEVEN -4.6 02/23/2021    B3TWFBCN 100 02/23/2021       LDH:  No results found for: LDH  Uric Acid:  No results found for: LABURIC, URICACID    PT/INR:    Lab Results   Component Value Date    PROTIME 11.3 09/07/2021    INR 1.00 09/07/2021     Warfarin PT/INR:  No components found for: Blease Lied  PTT:    Lab Results   Component Value Date    APTT 37.2 09/09/2021   [APTT}  Last 3 Troponin:    Lab Results   Component Value Date    TROPONINI 0.23 08/28/2021    TROPONINI 0.22 08/27/2021    TROPONINI 0.24 08/27/2021       U/A:    Lab Results   Component Value Date    COLORU YELLOW 08/27/2021    PROTEINU >300 08/27/2021    PHUR 6.0 08/28/2021    WBCUA 7 08/27/2021    RBCUA 3 08/27/2021    CLARITYU Clear 08/27/2021    SPECGRAV 1.013 08/27/2021    LEUKOCYTESUR Negative 08/27/2021    UROBILINOGEN 0.2 08/27/2021    BILIRUBINUR Negative 08/27/2021    BLOODU SMALL 08/27/2021    GLUCOSEU 250 08/27/2021     Microalbumen/Creatinine ratio:  No components found for: RUCREAT  24 Hour Urine for Protein:  No components found for: RAWUPRO, UHRS3, DNCJ79ZM, UTV3  24 Hour Urine for Creatinine Clearance:  No components found for: CREAT4, UHRS10, UTV10  Urine Toxicology:  No components found for: [place] Lighter, IBENZO, ICOCAINE, IMARTHC, IOPIATES, IPHENCYC    HgBA1c:    Lab Results   Component Value Date    LABA1C 5.7 08/27/2021     RPR:  No results found for: RPR  HIV:  No results found for: HIV  NILSA:    Lab Results   Component Value Date    NILSA Negative 04/25/2020     RF:  No results found for: RF  DSDNA:  No components found for: DNA  AMYLASE:  No results found for: AMYLASE  LIPASE:    Lab Results   Component Value Date    LIPASE 14.0 04/25/2020     Fibrinogen Level:  No components found for: FIB       BELOW MENTIONED RADIOLOGY STUDY RESULTS BY ME (AS NEEDED FOR MY EVALUATION AND MANAGEMENT). CT HEAD WO CONTRAST    Result Date: 8/27/2021  EXAMINATION: CT OF THE HEAD WITHOUT CONTRAST; CT OF THE CHEST WITHOUT CONTRAST  8/27/2021 7:12 pm TECHNIQUE: CT of the head was performed without the administration of intravenous contrast. Dose modulation, iterative reconstruction, and/or weight based adjustment of the mA/kV was utilized to reduce the radiation dose to as low as reasonably achievable.; CT of the chest was performed without the administration of intravenous contrast. Multiplanar reformatted images are provided for review. Dose modulation, iterative reconstruction, and/or weight based adjustment of the mA/kV was utilized to reduce the radiation dose to as low as reasonably achievable. COMPARISON: CT head 08/27/2020. HISTORY: ORDERING SYSTEM PROVIDED HISTORY: AMS TECHNOLOGIST PROVIDED HISTORY: Has a \"code stroke\" or \"stroke alert\" been called? ->No Reason for exam:->AMS Decision Support Exception - unselect if not a suspected or confirmed emergency medical condition->Emergency Medical Condition (MA) Is the patient pregnant?->No Reason for Exam: Respiratory Distress (Pt brought in per [facility] after call from  for resp distress. O2 sat 70's, BMV in route. Pt will open eyes to voice. Pupils 8mm and fixed. ) Acuity: Acute Type of Exam: Initial; ORDERING SYSTEM PROVIDED HISTORY: AMS, respiratory distress TECHNOLOGIST PROVIDED HISTORY: Reason for exam:->AMS, respiratory distress Is the patient pregnant?->No Reason for Exam: Respiratory Distress (Pt brought in per [facility] after call from  for resp distress. O2 sat 70's, BMV in route. Pt will open eyes to voice. Pupils 8mm and fixed. ) Acuity: Acute Type of Exam: Initial CT HEAD FINDINGS: BRAIN/VENTRICLES: No acute hemorrhage.    Periventricular and subcortical hypoattenuation is nonspecific. Interval chronic lacunar infarcts in the left corona radiata, thalamus, and internal capsule. Artifact partially obscures the laureano. Artifact partially obscures the inferior cerebellum. Annabelle Lamberto white differentiation appears maintained given artifact near the skull base and through the posterior fossa. Mild prominence of the ventricles noted. Overall ventricle size appears increased from prior exam.  There is no midline shift. Basal cisterns appear patent. ORBITS: Visualized orbits appear unremarkable on this unenhanced exam. SINUSES: Mild mucosal thickening of the ethmoid and sphenoid sinuses. Visualized mastoid air cells appear clear. SOFT TISSUES/SKULL: No depressed calvarial fracture. Fluid in the nasopharynx and oropharynx. CT HEAD IMPRESSION: No acute hemorrhage or midline shift. Increased ventricle size compared to prior exam.  Correlate with presentation to exclude acute hydrocephalus. Other findings as described. CT CHEST FINDINGS: Mediastinum: Heart is borderline enlarged. Trace pericardial effusion or thickening. Aorta is within normal limits in size. Pulmonary arteries are within normal limits in size. . Lungs/pleura: Central airways are patent. Endotracheal tube with tip terminating in the distal 3rd subglottic trachea. Secretions noted in the trachea. Opacification of segmental and subsegmental bronchi. Ground-glass the confluent airspace disease. Interlobular septal thickening. Small to moderate pleural effusions. No pneumothorax. Soft Tissues/Bones: Suboptimal evaluation for adenopathy without intravenous contrast. Mediastinal adenopathy. Diffuse body wall edema. Scattered degenerative changes noted in the visualized spine without spondylolisthesis.  Upper Abdomen: Limited images of the upper abdomen are unremarkable given lack of intravenous contrast. CT CHEST IMPRESSION: 1.   1. Multifocal airspace disease that likely represents a combination of thalamus, and internal capsule. Artifact partially obscures the laureano. Artifact partially obscures the inferior cerebellum. Amber Persaud white differentiation appears maintained given artifact near the skull base and through the posterior fossa. Mild prominence of the ventricles noted. Overall ventricle size appears increased from prior exam.  There is no midline shift. Basal cisterns appear patent. ORBITS: Visualized orbits appear unremarkable on this unenhanced exam. SINUSES: Mild mucosal thickening of the ethmoid and sphenoid sinuses. Visualized mastoid air cells appear clear. SOFT TISSUES/SKULL: No depressed calvarial fracture. Fluid in the nasopharynx and oropharynx. CT HEAD IMPRESSION: No acute hemorrhage or midline shift. Increased ventricle size compared to prior exam.  Correlate with presentation to exclude acute hydrocephalus. Other findings as described. CT CHEST FINDINGS: Mediastinum: Heart is borderline enlarged. Trace pericardial effusion or thickening. Aorta is within normal limits in size. Pulmonary arteries are within normal limits in size. . Lungs/pleura: Central airways are patent. Endotracheal tube with tip terminating in the distal 3rd subglottic trachea. Secretions noted in the trachea. Opacification of segmental and subsegmental bronchi. Ground-glass the confluent airspace disease. Interlobular septal thickening. Small to moderate pleural effusions. No pneumothorax. Soft Tissues/Bones: Suboptimal evaluation for adenopathy without intravenous contrast. Mediastinal adenopathy. Diffuse body wall edema. Scattered degenerative changes noted in the visualized spine without spondylolisthesis. Upper Abdomen: Limited images of the upper abdomen are unremarkable given lack of intravenous contrast. CT CHEST IMPRESSION: 1.   1. Multifocal airspace disease that likely represents a combination of aspiration/pneumonia and pulmonary edema. 2. Other findings as described.      XR CHEST PORTABLE    Result Date: 8/28/2021  EXAMINATION: ONE XRAY VIEW OF THE CHEST 8/28/2021 1:21 am COMPARISON: Chest x-ray dated 02/24/2021. Prashanth Cervantes HISTORY: ORDERING SYSTEM PROVIDED HISTORY: central line and OG placement TECHNOLOGIST PROVIDED HISTORY: Reason for exam:->central line and OG placement FINDINGS: LINES/TUBES/OTHER: Endotracheal tube is noted with its tip approximately 5 cm from the ana. Enteric tube is noted coursing below the level of the diaphragm and within the stomach. Left IJ central venous catheter is noted with its tip projecting over the area of the left brachiocephalic vein. HEART/MEDIASTINUM: The cardiac silhouette is mildly enlarged, but stable. PLEURA/LUNGS: There is perihilar fullness and increased interstitial markings which may reflect pulmonary vascular congestion in the correct clinical setting. There is left basilar reflecting airspace disease, atelectasis or pleural effusion. There is right basilar airspace disease. There is no appreciable pneumothorax. BONES/SOFT TISSUE: No acute abnormality. Endotracheal tube and enteric tube are in satisfactory position. The left IJ central venous catheter tip projects over the area of the left brachiocephalic vein. Right basilar airspace disease. Left basilar opacification reflecting airspace disease, atelectasis or pleural effusion. Conclusions      Summary   *Left ventricle - moderate concentric LVH, normal size and function with EF   of 55%   *Mitral valve - mild regurgitation   *Tricuspid valve - trivial regurgitation   *Bubble study - negative      Signature      ------------------------------------------------------------------   Electronically signed by Brenda Riggs MD (Interpreting   physician) on 08/31/2020 at 02:48 PM   ------------------------------------------------------------------         Summary   Left ventricular systolic function is normal with ejection fraction   estimated at 55-60 %.    No regional wall motion abnormalities are noted.   There is mild left ventricular hypertrophy. Normal left ventricular diastolic filling pressure. Moderate mitral regurgitation. Mild pulmonic regurgitation present. IVC size is dilated (>2.1 cm) but collapses > 50% with respiration   consistent with elevated RA pressure (8 mmHg). **There is a small-moderate bilateral pleural effusion. **There is a small circumferential pericardial effusion noted.       Previous echo done 2020 - EF 55%      Signature      ------------------------------------------------------------------   Electronically signed by Karine Ortega MD, Carolynn Jose   (Interpreting physician) on 08/30/2021 at 04:21 PM   ------------------------------------------------------------------

## 2021-09-10 LAB
A/G RATIO: 0.8 (ref 1.1–2.2)
ALBUMIN SERPL-MCNC: 2.7 G/DL (ref 3.4–5)
ALP BLD-CCNC: 114 U/L (ref 40–129)
ALT SERPL-CCNC: <5 U/L (ref 10–40)
ANION GAP SERPL CALCULATED.3IONS-SCNC: 13 MMOL/L (ref 3–16)
APTT: 32.1 SEC (ref 26.2–38.6)
AST SERPL-CCNC: 9 U/L (ref 15–37)
BASOPHILS ABSOLUTE: 0.1 K/UL (ref 0–0.2)
BASOPHILS RELATIVE PERCENT: 0.4 %
BILIRUB SERPL-MCNC: <0.2 MG/DL (ref 0–1)
BUN BLDV-MCNC: 34 MG/DL (ref 7–20)
CALCIUM IONIZED: 1.15 MMOL/L (ref 1.12–1.32)
CALCIUM SERPL-MCNC: 8.7 MG/DL (ref 8.3–10.6)
CHLORIDE BLD-SCNC: 101 MMOL/L (ref 99–110)
CO2: 17 MMOL/L (ref 21–32)
CREAT SERPL-MCNC: 4.8 MG/DL (ref 0.6–1.1)
EOSINOPHILS ABSOLUTE: 0.3 K/UL (ref 0–0.6)
EOSINOPHILS RELATIVE PERCENT: 1.8 %
GFR AFRICAN AMERICAN: 12
GFR NON-AFRICAN AMERICAN: 10
GLOBULIN: 3.4 G/DL
GLUCOSE BLD-MCNC: 130 MG/DL (ref 70–99)
GLUCOSE BLD-MCNC: 196 MG/DL (ref 70–99)
GLUCOSE BLD-MCNC: 245 MG/DL (ref 70–99)
HCT VFR BLD CALC: 29.7 % (ref 36–48)
HEMOGLOBIN: 9.9 G/DL (ref 12–16)
LACTIC ACID: 0.6 MMOL/L (ref 0.4–2)
LYMPHOCYTES ABSOLUTE: 2.5 K/UL (ref 1–5.1)
LYMPHOCYTES RELATIVE PERCENT: 13.8 %
MAGNESIUM: 2 MG/DL (ref 1.8–2.4)
MCH RBC QN AUTO: 29 PG (ref 26–34)
MCHC RBC AUTO-ENTMCNC: 33.2 G/DL (ref 31–36)
MCV RBC AUTO: 87.5 FL (ref 80–100)
MONOCYTES ABSOLUTE: 1.6 K/UL (ref 0–1.3)
MONOCYTES RELATIVE PERCENT: 9.1 %
NEUTROPHILS ABSOLUTE: 13.4 K/UL (ref 1.7–7.7)
NEUTROPHILS RELATIVE PERCENT: 74.9 %
PDW BLD-RTO: 16.2 % (ref 12.4–15.4)
PERFORMED ON: ABNORMAL
PERFORMED ON: ABNORMAL
PH VENOUS: 7.33 (ref 7.35–7.45)
PHOSPHORUS: 3.8 MG/DL (ref 2.5–4.9)
PLATELET # BLD: 355 K/UL (ref 135–450)
PMV BLD AUTO: 8.7 FL (ref 5–10.5)
POTASSIUM SERPL-SCNC: 3.9 MMOL/L (ref 3.5–5.1)
RBC # BLD: 3.4 M/UL (ref 4–5.2)
SARS-COV-2, NAAT: NOT DETECTED
SODIUM BLD-SCNC: 131 MMOL/L (ref 136–145)
TOTAL CK: 41 U/L (ref 26–192)
TOTAL PROTEIN: 6.1 G/DL (ref 6.4–8.2)
WBC # BLD: 17.9 K/UL (ref 4–11)

## 2021-09-10 PROCEDURE — 80053 COMPREHEN METABOLIC PANEL: CPT

## 2021-09-10 PROCEDURE — 82550 ASSAY OF CK (CPK): CPT

## 2021-09-10 PROCEDURE — 83735 ASSAY OF MAGNESIUM: CPT

## 2021-09-10 PROCEDURE — 6370000000 HC RX 637 (ALT 250 FOR IP): Performed by: INTERNAL MEDICINE

## 2021-09-10 PROCEDURE — 2580000003 HC RX 258: Performed by: INTERNAL MEDICINE

## 2021-09-10 PROCEDURE — 6360000002 HC RX W HCPCS: Performed by: INTERNAL MEDICINE

## 2021-09-10 PROCEDURE — 83605 ASSAY OF LACTIC ACID: CPT

## 2021-09-10 PROCEDURE — 85730 THROMBOPLASTIN TIME PARTIAL: CPT

## 2021-09-10 PROCEDURE — 99253 IP/OBS CNSLTJ NEW/EST LOW 45: CPT | Performed by: PSYCHIATRY & NEUROLOGY

## 2021-09-10 PROCEDURE — 90935 HEMODIALYSIS ONE EVALUATION: CPT

## 2021-09-10 PROCEDURE — 84100 ASSAY OF PHOSPHORUS: CPT

## 2021-09-10 PROCEDURE — 6370000000 HC RX 637 (ALT 250 FOR IP): Performed by: NURSE PRACTITIONER

## 2021-09-10 PROCEDURE — 2000000000 HC ICU R&B

## 2021-09-10 PROCEDURE — 82330 ASSAY OF CALCIUM: CPT

## 2021-09-10 PROCEDURE — 87635 SARS-COV-2 COVID-19 AMP PRB: CPT

## 2021-09-10 PROCEDURE — 85025 COMPLETE CBC W/AUTO DIFF WBC: CPT

## 2021-09-10 PROCEDURE — 2500000003 HC RX 250 WO HCPCS: Performed by: INTERNAL MEDICINE

## 2021-09-10 RX ORDER — FLUOXETINE HYDROCHLORIDE 20 MG/1
20 CAPSULE ORAL DAILY
Status: DISCONTINUED | OUTPATIENT
Start: 2021-09-11 | End: 2021-09-13 | Stop reason: HOSPADM

## 2021-09-10 RX ADMIN — ATORVASTATIN CALCIUM 40 MG: 40 TABLET, FILM COATED ORAL at 21:12

## 2021-09-10 RX ADMIN — SERTRALINE 50 MG: 50 TABLET, FILM COATED ORAL at 13:54

## 2021-09-10 RX ADMIN — AMLODIPINE BESYLATE 10 MG: 5 TABLET ORAL at 13:54

## 2021-09-10 RX ADMIN — PANTOPRAZOLE SODIUM 40 MG: 40 TABLET, DELAYED RELEASE ORAL at 13:56

## 2021-09-10 RX ADMIN — MICONAZOLE NITRATE: 20 POWDER TOPICAL at 21:15

## 2021-09-10 RX ADMIN — INSULIN LISPRO 2 UNITS: 100 INJECTION, SOLUTION INTRAVENOUS; SUBCUTANEOUS at 16:53

## 2021-09-10 RX ADMIN — ERYTHROPOIETIN 10000 UNITS: 20000 INJECTION, SOLUTION INTRAVENOUS; SUBCUTANEOUS at 13:56

## 2021-09-10 RX ADMIN — PANTOPRAZOLE SODIUM 40 MG: 40 TABLET, DELAYED RELEASE ORAL at 21:12

## 2021-09-10 RX ADMIN — HEPARIN SODIUM 5000 UNITS: 5000 INJECTION INTRAVENOUS; SUBCUTANEOUS at 05:29

## 2021-09-10 RX ADMIN — Medication 10 ML: at 11:08

## 2021-09-10 RX ADMIN — Medication 10 ML: at 21:15

## 2021-09-10 RX ADMIN — MICONAZOLE NITRATE: 20 POWDER TOPICAL at 13:56

## 2021-09-10 RX ADMIN — HEPARIN SODIUM 5000 UNITS: 5000 INJECTION INTRAVENOUS; SUBCUTANEOUS at 13:53

## 2021-09-10 RX ADMIN — FUROSEMIDE 40 MG: 40 TABLET ORAL at 13:55

## 2021-09-10 RX ADMIN — ERYTHROPOIETIN 10000 UNITS: 20000 INJECTION, SOLUTION INTRAVENOUS; SUBCUTANEOUS at 09:50

## 2021-09-10 RX ADMIN — PROPRANOLOL HYDROCHLORIDE 80 MG: 80 CAPSULE, EXTENDED RELEASE ORAL at 13:55

## 2021-09-10 RX ADMIN — HEPARIN SODIUM 5000 UNITS: 5000 INJECTION INTRAVENOUS; SUBCUTANEOUS at 21:13

## 2021-09-10 RX ADMIN — ASPIRIN 81 MG: 81 TABLET, COATED ORAL at 13:54

## 2021-09-10 RX ADMIN — INSULIN LISPRO 1 UNITS: 100 INJECTION, SOLUTION INTRAVENOUS; SUBCUTANEOUS at 21:14

## 2021-09-10 ASSESSMENT — PAIN SCALES - GENERAL
PAINLEVEL_OUTOF10: 0

## 2021-09-10 NOTE — PROGRESS NOTES
Speech Language Pathology  Attempt    Ignacia Johnson  1975    Attempted to see pt for dysphagia/cognitive treatment; however, pt off floor in dialysis at time of attempt. Will re-attempt as therapy schedule and pt availability allow.     Thanks,  Dorothy Patrick M.S. [id number] [facility]  Speech-Language Pathologist

## 2021-09-10 NOTE — CONSULTS
PSYCHIATRY CONSULT, INITIAL EVALUATION    Referring Provider:  Nancy Crowe MD    CC/Reason for Consult: hallucinations, delusions      ASSESSMENT:   56 yo F, no past hx of psychotic illness, hx depression and anxiety. Likely having some delirium and paranoia here as a result of her recovering from her acute medical illness. She is more clear today, has insight into the confusion she was having, and is not delusional or psychotic at this time. Pretty stable mood/anxiety wise. Recent was placed on clonazepam and prozac, but doesn't appear that she filled or started the clonazepam yet. 1. Unspecified depressive disorder   2. Panic Disorder without agoraphobia    RECOMMENDATIONS:   1. Will d/c sertraline and change to fluoxetine 20mg daily  2. Will not resume clonazepam at this time to avoid deliriogenic medications. This can be resumed outpatient per her outpatient provider. 3. CT scan showed enlarged ventricles and to correlate with clinical exam to exclude hydrocephalus. I'm not sure if this has any role with her encephalopathy. 4. Low dose quetiapine 25mg- 50mg TID prn can be considered prn if symptoms return. Dispo: does not require inpatient psych admission  Safety: Not an imminent safety risk at this time. Thank you for this consult, please call the psychiatry consult line for further questions. I will sign off at this time. ____________________________________________________________________________    HPI:   context: 56 yo F with hx of anxiety and depression, presented to the hospital with acute respiratory failure. associated symptoms: Noted to be confused, delusional, paranoid yesterday, and wanting to leave. Today she is more pleasant, cooperative. She recognizes she was acting delusional and was anxious and believing things that weren't real and wanting to leave the last couple days.  Today she denies any of these feelings, feels safe here, participating in her treatment, denies 9/7/2021    IR TUNNELED CATHETER PLACEMENT GREATER THAN 5 YEARS 9/7/2021 Adria Duffy MD [facility] SPECIAL PROCEDURES       Social/Developmental History:    Relationship:    Children: none   Supports:    Housing: lives with  and mother    Family History:   Family History   Problem Relation Age of Onset    Diabetes Mother    [address] Other Mother 79        Covid    Diabetes Father     High Blood Pressure Father     Colon Cancer Father     Diabetes Sister     Alcohol Abuse Maternal Grandfather     Diabetes Paternal Grandmother     Alcohol Abuse Paternal Grandfather     Diabetes Paternal Aunt     Diabetes Paternal Uncle       Psychiatric: sister - OCD, mother - anxiety (hx of response to sertraline)    Allergies: Allergies   Allergen Reactions    Amoxicillin Itching and Hives     Tolerates cephalosporins  Patient tolerating cefazolin (ANCEF) as of October 11, 2018  Patient tolerating cefazolin (ANCEF) as of October 11, 2018  Tolerates cephalosporins  Patient tolerating cefazolin (ANCEF) as of October 11, 2018    Levofloxacin Anaphylaxis    Vancomycin Shortness Of Breath, Hives and Anaphylaxis    Tape [Adhesive Tape] Other (See Comments)     Paper tape turns skin bright red. Plastic tape okay. Home Medications:   No current facility-administered medications on file prior to encounter.      Current Outpatient Medications on File Prior to Encounter   Medication Sig Dispense Refill    Dulaglutide 0.75 MG/0.5ML SOPN Inject 0.75 mg into the skin once a week 4 pen 1    ibuprofen (ADVIL;MOTRIN) 200 MG CAPS Take 1 capsule by mouth      furosemide (LASIX) 80 MG tablet Take 80 mg by mouth every morning 80mg in the morning 40mg in the evening      amLODIPine (NORVASC) 10 MG tablet Take 1 tablet by mouth daily 30 tablet 1    furosemide (LASIX) 40 MG tablet Take 1 tablet by mouth every evening 30 tablet 1    spironolactone (ALDACTONE) 50 MG tablet Take 1 tablet by mouth daily 30 tablet 2  insulin glargine (LANTUS;BASAGLAR) 100 UNIT/ML injection pen Inject 30 Units into the skin daily 4 pen 2    lisinopril (PRINIVIL;ZESTRIL) 40 MG tablet Take 1 tablet by mouth daily 30 tablet 2    atorvastatin (LIPITOR) 40 MG tablet Take 1 tablet by mouth nightly 30 tablet 3    Insulin Pen Needle 29G X 12.7MM MISC 1 each by Does not apply route daily 100 each 5    propranolol (INDERAL LA) 80 MG extended release capsule Take 1 capsule by mouth every morning 30 capsule 2    LORazepam (ATIVAN) 0.5 MG tablet Take 0.5 mg by mouth 2 times daily as needed for Anxiety.  aspirin 81 MG EC tablet Take 1 tablet by mouth daily 30 tablet 3    pregabalin (LYRICA) 75 MG capsule Take 1 capsule by mouth nightly for 7 days. 7 capsule 0    sertraline (ZOLOFT) 50 MG tablet Take 1 tablet by mouth daily 30 tablet 3    glucose monitoring kit (FREESTYLE) monitoring kit 1 kit by Does not apply route daily 1 kit 0    insulin lispro, 1 Unit Dial, 100 UNIT/ML SOPN Inject 0-6 Units into the skin 3 times daily (with meals) **Corrective Low Dose Algorithm**  Glucose: Dose:               No Insulin  140-199 1 Unit  200-249 2 Units  250-299 3 Units  300-349 4 Units  350-399 5 Units  Over 399 6 Units (Patient taking differently: Inject 6-12 Units into the skin 3 times daily (with meals) **Corrective Low Dose Algorithm**  Glucose: Dose:               No Insulin  140-199 1 Unit  200-249 2 Units  250-299 3 Units  300-349 4 Units  350-399 5 Units  Over 399 6 Units) 3 pen 0    glucose blood VI test strips (VICTORY AGM-4000 TEST) strip Test four times daily until controlled and then three times daily.   Code 250.02  ONE TOUCH ULTRA MONITOR 100 strip 12       Medications:  Scheduled Meds:   [START ON 9/11/2021] FLUoxetine  20 mg Oral Daily    miconazole   Topical BID    aspirin  81 mg Oral Daily    insulin lispro  0-6 Units SubCUTAneous TID WC    insulin lispro  0-3 Units SubCUTAneous Nightly    pantoprazole  40 mg Oral BID  furosemide  40 mg Oral Daily    sodium chloride flush  5-40 mL IntraVENous 2 times per day    amLODIPine  10 mg Oral Daily    atorvastatin  40 mg Oral Nightly    propranolol  80 mg Oral QAM    epoetin yohana  10,000 Units IntraVENous Q MWF    sodium chloride flush  5-40 mL IntraVENous Q12H    cloNIDine  1 patch TransDERmal Weekly    heparin (porcine)  5,000 Units SubCUTAneous 3 times per day    sodium chloride flush  5-40 mL IntraVENous 2 times per day     PRN Meds:.haloperidol lactate, heparin (porcine), sodium chloride flush, sodium chloride, acetaminophen, oxyCODONE-acetaminophen **OR** oxyCODONE-acetaminophen, hydrALAZINE, sodium chloride, sodium chloride flush, fluticasone, LORazepam, albumin human, glucose, dextrose, glucagon (rDNA), dextrose, fentanNYL, sodium chloride flush, sodium chloride, ondansetron **OR** ondansetron, polyethylene glycol, acetaminophen **OR** acetaminophen    OBJECTIVE:  .  Vitals:    09/10/21 0427 09/10/21 0609 09/10/21 0842 09/10/21 1115   BP:   (!) 172/85 (!) 150/75   Pulse:   92 93   Resp:   18 18   Temp: 97.1 °F (36.2 °C)  96.8 °F (36 °C) 96.8 °F (36 °C)   TempSrc: Temporal  Temporal Temporal   SpO2:       Weight:  159 lb 13.3 oz (72.5 kg) 185 lb 3 oz (84 kg) 184 lb 1.4 oz (83.5 kg)   Height:           MSE:   Appearance    alert, cooperative  Motor:  No abnormal movements, tics or mannerisms.   Speech    spontaneous, normal rate and normal volume  Language    0 - no aphasia, normal  Mood/Affect    good / bluinted  Thought Process    linear, goal directed and coherent  Thought Content    intact , no delusions, no suicidal ideation  Associations    logical connections  Attention/Concentration    intact  Orientation    Oriented to month, close to date, knows time of day, says it is 2023, oriented to place and situation  Memory   Impaired for recent events  Fund of Knowledge    intact  [address] / Intact    Labs:   Recent Labs     09/08/21  0353 09/09/21  1827 There is no hydrocephalus. The sellar/suprasellar regions appear unremarkable.  The normal signal voids   within the major intracranial vessels appear maintained.       ORBITS: The visualized portion of the orbits demonstrate no acute abnormality.       SINUSES: There is scattered minimal mucosal thickening in the paranasal   sinuses.  There is severe left mastoid effusion.       BONES/SOFT TISSUES: The bone marrow signal intensity appears normal. The soft   tissues demonstrate no acute abnormality.           Impression   Acute infarction in the posterior limb of the left internal capsule.       Minimal chronic microvascular disease.       Severe left mastoid effusion.         CT Head 2021:   CT HEAD FINDINGS:   BRAIN/VENTRICLES: No acute hemorrhage.   Periventricular and subcortical   hypoattenuation is nonspecific.  Interval chronic lacunar infarcts in the   left corona radiata, thalamus, and internal capsule.       Artifact partially obscures the laureano.       Artifact partially obscures the inferior cerebellum.       Gray white differentiation appears maintained given artifact near the skull   base and through the posterior fossa.  Mild prominence of the ventricles   noted.  Overall ventricle size appears increased from prior exam.  There is   no midline shift. Basal cisterns appear patent.       ORBITS: Visualized orbits appear unremarkable on this unenhanced exam.       SINUSES: Mild mucosal thickening of the ethmoid and sphenoid sinuses. Visualized mastoid air cells appear clear.       SOFT TISSUES/SKULL: No depressed calvarial fracture.  Fluid in the   nasopharynx and oropharynx.       CT HEAD IMPRESSION:   No acute hemorrhage or midline shift.       Increased ventricle size compared to prior exam.  Correlate with presentation   to exclude acute hydrocephalus.       Other findings as described.      EK2021: rate 86 bpm, NSR, non-specific t-wave abnormality, QTc 471ms        Osman Rabago MD Psychiatrist

## 2021-09-10 NOTE — CARE COORDINATION
Referrals sent to [facility] [place] [id number] at [facility], [employer] per 's request.    CM called [facility] Pruitt/ [facility], she has not heard anything from insurance payor regarding dialysis chair agreement.     Robert Rowan, RAHEELN, CCM, RN  [facility]  [phone]

## 2021-09-10 NOTE — PROGRESS NOTES
MD Brandy Garcia MD Bianca Jaeger, MD               Office: [phone]                      Fax: [phone]             [address] [place] [address]                   NEPHROLOGY CVU INPATIENT PROGRESS NOTE:     PATIENT NAME: Stephani Vasquez  : 1975  MRN: [id number]     Nephrology treatment plan update. Cardiac cath done, non-obstructive CAD. EF-60%  TDC placed   Outpt placement at [facility] pending  - HD today. Around EDW. 2L fluid removal.  Next HD Monday. - switched to po Lasix, decreased to 40mg daily. Now appearing euvolemic.    - RIA IV with HD MWF  - follow outpt HD placement    -Patient has advanced background of chronic kidney disease stage IV from diabetic nephropathy.  -Follows with me.   -Likely progression of kidney disease. Medications reviewed. Hold Ace inhibitors    Anemia-hgb better. RIA with HD. Poor nutritional status.  -Albumin is 2.5. Discussed with RN. RECOMMENDATIONS:   HD today. Next HD Monday  Outpt HD placement pending. Discussed with HD  yesterday  Switched to po Lasix. D/C plan from renal stand point:  Once outpt HD unit available. IMPRESSION:       Admitted for:  Acute respiratory failure ([address] [J96.00]  Encephalopathy [G93.40]  Respiratory failure with hypoxia, unspecified chronicity ([address] [J96.91]  Pneumonia due to infectious organism, unspecified laterality, unspecified part of lung [J18.9]      KINZA (on proteinuric CKD: 4): Worsening.   Started on HD.   - BL Scr-last known 2.5, in 2021,   ---> 4.6 on admission  -: Etiology of KINZA -presumed ATN in the setting of pneumonia, unclear if it this is advancing CKD    -appreciated assistance by Dr Minh Parks for Rt IJ non-TDC placement ()   -Hemodialysis #1 2021      History of nephrotic range proteinuria,   - thought to be from diabetic nephropathy With CKD stage III -> so high risk of advanced CKD,  -Follows with me  -Noncompliance, last follow-up was 2/2021 then lost for follow-up  -echo results -  IVC size is dilated (>2.1 cm) but collapses > 50% with respiration consistent with elevated RA pressure (8 mmHg). -Last echo-8/2020  moderate concentric LVH, normal size and function with EF of 90%, normal diastolic function      Associated problems:   Hypokalemia-needing repletion. Better. Acidosis, non-anion gap-HD today  Hypomagnesemia-better  Hypophosphatemia-better  Anemia, chronic disease-RIA with HD      Other major problems: Management per primary and other consulting teams.   //Acute hypoxic respiratory failure -needing intubation. Extubated  // Multifocal airspace disease that likely represents a combination of aspiration/pneumonia and pulmonary edema  //Fluid overload  -COVID was ruled out  //History of uncontrolled diabetes last A1c was 11.3    // Hypertension un-controlled,       Hospital Problems         Last Modified POA    Pulmonary edema 8/28/2021 Yes    Chronic kidney disease (CKD), stage III (moderate) ([facility]) 8/28/2021 Yes    Chronic diastolic (congestive) heart failure ([address] 8/28/2021 Yes    Acute on chronic diastolic heart failure ([address] 8/28/2021 Yes    Acute respiratory failure ([address] 8/27/2021 Yes           Thank you for allowing me to participate in this patient's care. Please do not hesitate to contact me anytime. We will follow along with you. Emil Barajas MD,  [facility] [address] [place]: [phone] or [email or web address]  Fax: [phone]     Severally ill, at risk of impending organ failure needing ICU higher level of care/monitoring. Time spent  35 minutes that included face-to-face meeting/discussion with patient's treatment team (including primary/referring team and other consultants; included coordination of care with the treatment team; and review of patient's electronic medical records and ordering appropriates tests. ========================================================   ========================================================   Subjective:   No complaints today  Neg 18L since admission      Past medical, Surgical, Social, Family medical history reviewed by me. MEDICATIONS: reviewed by me. Medications Prior to Admission:  No current facility-administered medications on file prior to encounter. Current Outpatient Medications on File Prior to Encounter   Medication Sig Dispense Refill    Dulaglutide 0.75 MG/0.5ML SOPN Inject 0.75 mg into the skin once a week 4 pen 1    ibuprofen (ADVIL;MOTRIN) 200 MG CAPS Take 1 capsule by mouth      furosemide (LASIX) 80 MG tablet Take 80 mg by mouth every morning 80mg in the morning 40mg in the evening      amLODIPine (NORVASC) 10 MG tablet Take 1 tablet by mouth daily 30 tablet 1    furosemide (LASIX) 40 MG tablet Take 1 tablet by mouth every evening 30 tablet 1    spironolactone (ALDACTONE) 50 MG tablet Take 1 tablet by mouth daily 30 tablet 2    insulin glargine (LANTUS;BASAGLAR) 100 UNIT/ML injection pen Inject 30 Units into the skin daily 4 pen 2    lisinopril (PRINIVIL;ZESTRIL) 40 MG tablet Take 1 tablet by mouth daily 30 tablet 2    atorvastatin (LIPITOR) 40 MG tablet Take 1 tablet by mouth nightly 30 tablet 3    Insulin Pen Needle 29G X 12.7MM MISC 1 each by Does not apply route daily 100 each 5    propranolol (INDERAL LA) 80 MG extended release capsule Take 1 capsule by mouth every morning 30 capsule 2    LORazepam (ATIVAN) 0.5 MG tablet Take 0.5 mg by mouth 2 times daily as needed for Anxiety.  aspirin 81 MG EC tablet Take 1 tablet by mouth daily 30 tablet 3    pregabalin (LYRICA) 75 MG capsule Take 1 capsule by mouth nightly for 7 days.  7 capsule 0    sertraline (ZOLOFT) 50 MG tablet Take 1 tablet by mouth daily 30 tablet 3    glucose monitoring kit (FREESTYLE) monitoring kit 1 kit by Does not apply route daily 1 kit 0    insulin lispro, 1 Unit Dial, 100 UNIT/ML SOPN Inject 0-6 Units into the skin 3 times daily (with meals) **Corrective Low Dose Algorithm**  Glucose: Dose:               No Insulin  140-199 1 Unit  200-249 2 Units  250-299 3 Units  300-349 4 Units  350-399 5 Units  Over 399 6 Units (Patient taking differently: Inject 6-12 Units into the skin 3 times daily (with meals) **Corrective Low Dose Algorithm**  Glucose: Dose:               No Insulin  140-199 1 Unit  200-249 2 Units  250-299 3 Units  300-349 4 Units  350-399 5 Units  Over 399 6 Units) 3 pen 0    glucose blood VI test strips (VICTORY AGM-4000 TEST) strip Test four times daily until controlled and then three times daily.   Code 250.02  ONE TOUCH ULTRA MONITOR 100 strip 12         Current Facility-Administered Medications:     miconazole (MICOTIN) 2 % powder, , Topical, BID, Marcelino Carver MD    aspirin EC tablet 81 mg, 81 mg, Oral, Daily, RIVERA Lim - CNP    insulin lispro (1 Unit Dial) 0-6 Units, 0-6 Units, SubCUTAneous, TID WC, Hayde Felix MD    insulin lispro (1 Unit Dial) 0-3 Units, 0-3 Units, SubCUTAneous, Nightly, Hayde Felix MD, 1 Units at 09/09/21 2221    pantoprazole (PROTONIX) tablet 40 mg, 40 mg, Oral, BID, Marcelino Carver MD, 40 mg at 09/09/21 2204    haloperidol lactate (HALDOL) injection 5 mg, 5 mg, IntraMUSCular, Q12H PRN, Marcelino Carver MD    heparin (porcine) injection 3,600 Units, 3,600 Units, IntraCATHeter, PRN, Lakeisha Thurman MD, 3,600 Units at 09/08/21 1000    furosemide (LASIX) tablet 40 mg, 40 mg, Oral, Daily, Dillon Gaming MD, 40 mg at 09/09/21 7587    sodium chloride flush 0.9 % injection 5-40 mL, 5-40 mL, IntraVENous, 2 times per day, Chevy Ambriz MD, 10 mL at 09/09/21 2220    sodium chloride flush 0.9 % injection 5-40 mL, 5-40 mL, IntraVENous, PRN, Chevy Ambriz MD, 10 mL at 09/09/21 0825    0.9 % sodium chloride infusion, 25 mL, IntraVENous, PRN, Chevy Ambrzi MD  [facility] gel 15 g, 15 g, Oral, PRN, Ahmad A Oleg, DO    dextrose 50 % IV solution, 12.5 g, IntraVENous, PRN, Ahmad A Oleg, DO    glucagon (rDNA) injection 1 mg, 1 mg, IntraMUSCular, PRN, Ahmad A Oleg, DO    dextrose 5 % solution, 100 mL/hr, IntraVENous, PRN, Ahmad A Oleg, DO    fentaNYL (SUBLIMAZE) injection 50 mcg, 50 mcg, IntraVENous, Q1H PRN, Keshawn Robins MD    sodium chloride flush 0.9 % injection 5-40 mL, 5-40 mL, IntraVENous, 2 times per day, Ahmad A Oleg, DO, 10 mL at 09/09/21 2200    sodium chloride flush 0.9 % injection 5-40 mL, 5-40 mL, IntraVENous, PRN, Ahmad A Oleg, DO, 10 mL at 09/05/21 1657    0.9 % sodium chloride infusion, 25 mL, IntraVENous, PRN, Josralexandra Salcedo Oleg, DO, Stopped at 08/29/21 2301    ondansetron (ZOFRAN-ODT) disintegrating tablet 4 mg, 4 mg, Oral, Q8H PRN **OR** ondansetron (ZOFRAN) injection 4 mg, 4 mg, IntraVENous, Q6H PRN, Ahmad A Oleg, DO    polyethylene glycol (GLYCOLAX) packet 17 g, 17 g, Oral, Daily PRN, Brennan Felty A Oleg, DO    acetaminophen (TYLENOL) tablet 650 mg, 650 mg, Oral, Q6H PRN **OR** acetaminophen (TYLENOL) suppository 650 mg, 650 mg, Rectal, Q6H PRN, Ahmad A Oleg, DO, 650 mg at 09/02/21 1648      REVIEW OF SYSTEMS:     Review of systems not obtained due to patient factors - intubation       PHYSICAL EXAM:  Recent vital signs and recent I/Os reviewed by me.      Wt Readings from Last 3 Encounters:   09/10/21 185 lb 3 oz (84 kg)   07/08/21 244 lb 11.2 oz (111 kg)   02/26/21 237 lb 3.2 oz (107.6 kg)     BP Readings from Last 3 Encounters:   09/10/21 (!) 172/85   07/08/21 (!) 160/100   02/26/21 133/86     Patient Vitals for the past 24 hrs:   BP Temp Temp src Pulse Resp SpO2 Weight   09/10/21 0842 (!) 172/85 96.8 °F (36 °C) Temporal 92 18 -- 185 lb 3 oz (84 kg)   09/10/21 0609 -- -- -- -- -- -- 159 lb 13.3 oz (72.5 kg)   09/10/21 0427 -- 97.1 °F (36.2 °C) Temporal -- -- -- --   09/10/21 0047 (!) 153/58 97 °F (36.1 °C) Temporal 82 13 96 % --   09/09/21 2153 (!) 140/67 98 °F (36.7 °C) Temporal 80 12 96 % --   09/09/21 1530 127/73 98.2 °F (36.8 °C) Temporal 94 20 -- --   09/09/21 1131 (!) 114/54 98.1 °F (36.7 °C) Temporal 95 16 98 % --       Intake/Output Summary (Last 24 hours) at 9/10/2021 1046  Last data filed at 9/9/2021 2000  Gross per 24 hour   Intake 90 ml   Output --   Net 90 ml          Constitutional: Poorly responsive, Intubated and  obese habitus  Eyes: Conjunctiva clear and Noscleral icterus  Ear, Nose, and Throat: moist oral mucosa; Neck: Trachea midline,  No jugular venous distension  Cardiovascular: Regular rate and ryhthm, normal S1 and S2,    Respiratory: clear  Abdomen:  distended. Normal bowel sounds. : Meza catheter is inserted, draining urine  Skin: no rash,  bruises on visible body area  Musculoskeletal: No clubbing or cyanosis of digits. and Normocephalic. Extremitties: no peripheral edema, no deformities. Neurologic:Unable to obtain as intubated/sedated. Psychiatric: Unable to obtain as intubated/sedated. DATA:  Diagnostic tests reviewed by me for today's visit:   (AS NEEDED FOR MY EVALUATION AND MANAGEMENT). Recent Labs     09/08/21 0353 09/09/21  0550 09/10/21  0524   WBC 15.0* 20.2* 17.9*   HCT 28.6* 30.9* 29.7*    324 355     Iron Saturation:  No components found for: PERCENTFE  FERRITIN:    Lab Results   Component Value Date    FERRITIN 112.0 08/28/2021     IRON:    Lab Results   Component Value Date    IRON 22 08/28/2021     TIBC:    Lab Results   Component Value Date    TIBC 184 08/28/2021       Recent Labs     09/08/21  0353 09/09/21  0550 09/10/21  0524    135* 131*   K 4.0 3.8 3.9    103 101   CO2 16* 17* 17*   BUN 37* 24* 34*   CREATININE 4.6* 3.7* 4.8*     Recent Labs     09/08/21  0353 09/09/21  0550 09/10/21  0524   CALCIUM 8.4 8.7 8.7   MG 2.30 2.00 2.00   PHOS 4.7 3.3 3.8     No results for input(s): PH, PCO2, PO2 in the last 72 hours.     Invalid input(s): [id number], INSPIREDO2    ABG:  No results found for: PH, PCO2, PO2, HCO3, BE, THGB, TCO2, O2SAT  VBG:    Lab Results   Component Value Date    PHVEN 7.326 09/10/2021    XHW5ATV 34.3 02/23/2021    BEVEN -4.6 02/23/2021    U4ZDBWEU 100 02/23/2021       LDH:  No results found for: LDH  Uric Acid:  No results found for: LABURIC, URICACID    PT/INR:    Lab Results   Component Value Date    PROTIME 11.3 09/07/2021    INR 1.00 09/07/2021     Warfarin PT/INR:  No components found for: Jennifer Mercer  PTT:    Lab Results   Component Value Date    APTT 32.1 09/10/2021   [APTT}  Last 3 Troponin:    Lab Results   Component Value Date    TROPONINI 0.23 08/28/2021    TROPONINI 0.22 08/27/2021    TROPONINI 0.24 08/27/2021       U/A:    Lab Results   Component Value Date    COLORU YELLOW 08/27/2021    PROTEINU >300 08/27/2021    PHUR 6.0 08/28/2021    WBCUA 7 08/27/2021    RBCUA 3 08/27/2021    CLARITYU Clear 08/27/2021    SPECGRAV 1.013 08/27/2021    LEUKOCYTESUR Negative 08/27/2021    UROBILINOGEN 0.2 08/27/2021    BILIRUBINUR Negative 08/27/2021    BLOODU SMALL 08/27/2021    GLUCOSEU 250 08/27/2021     Microalbumen/Creatinine ratio:  No components found for: RUCREAT  24 Hour Urine for Protein:  No components found for: RAWUPRO, UHRS3, UJMF59IS, UTV3  24 Hour Urine for Creatinine Clearance:  No components found for: CREAT4, UHRS10, UTV10  Urine Toxicology:  No components found for: Perla Joselo, IBENZO, ICOCAINE, IMARTHC, IOPIATES, IPHENCYC    HgBA1c:    Lab Results   Component Value Date    LABA1C 5.7 08/27/2021     RPR:  No results found for: RPR  HIV:  No results found for: HIV  NILSA:    Lab Results   Component Value Date    NILSA Negative 04/25/2020     RF:  No results found for: RF  DSDNA:  No components found for: DNA  AMYLASE:  No results found for: AMYLASE  LIPASE:    Lab Results   Component Value Date    LIPASE 14.0 04/25/2020     Fibrinogen Level:  No components found for: FIB       BELOW MENTIONED RADIOLOGY STUDY RESULTS BY ME (AS NEEDED FOR MY EVALUATION AND MANAGEMENT). CT HEAD WO CONTRAST    Result Date: 8/27/2021  EXAMINATION: CT OF THE HEAD WITHOUT CONTRAST; CT OF THE CHEST WITHOUT CONTRAST  8/27/2021 7:12 pm TECHNIQUE: CT of the head was performed without the administration of intravenous contrast. Dose modulation, iterative reconstruction, and/or weight based adjustment of the mA/kV was utilized to reduce the radiation dose to as low as reasonably achievable.; CT of the chest was performed without the administration of intravenous contrast. Multiplanar reformatted images are provided for review. Dose modulation, iterative reconstruction, and/or weight based adjustment of the mA/kV was utilized to reduce the radiation dose to as low as reasonably achievable. COMPARISON: CT head 08/27/2020. HISTORY: ORDERING SYSTEM PROVIDED HISTORY: AMS TECHNOLOGIST PROVIDED HISTORY: Has a \"code stroke\" or \"stroke alert\" been called? ->No Reason for exam:->AMS Decision Support Exception - unselect if not a suspected or confirmed emergency medical condition->Emergency Medical Condition (MA) Is the patient pregnant?->No Reason for Exam: Respiratory Distress (Pt brought in per [facility] after call from  for resp distress. O2 sat 70's, BMV in route. Pt will open eyes to voice. Pupils 8mm and fixed. ) Acuity: Acute Type of Exam: Initial; ORDERING SYSTEM PROVIDED HISTORY: AMS, respiratory distress TECHNOLOGIST PROVIDED HISTORY: Reason for exam:->AMS, respiratory distress Is the patient pregnant?->No Reason for Exam: Respiratory Distress (Pt brought in per [facility] after call from  for resp distress. O2 sat 70's, BMV in route. Pt will open eyes to voice. Pupils 8mm and fixed. ) Acuity: Acute Type of Exam: Initial CT HEAD FINDINGS: BRAIN/VENTRICLES: No acute hemorrhage. Periventricular and subcortical hypoattenuation is nonspecific. Interval chronic lacunar infarcts in the left corona radiata, thalamus, and internal capsule.  Artifact partially obscures the laureano. Artifact partially obscures the inferior cerebellum. [place] Spotted white differentiation appears maintained given artifact near the skull base and through the posterior fossa. Mild prominence of the ventricles noted. Overall ventricle size appears increased from prior exam.  There is no midline shift. Basal cisterns appear patent. ORBITS: Visualized orbits appear unremarkable on this unenhanced exam. SINUSES: Mild mucosal thickening of the ethmoid and sphenoid sinuses. Visualized mastoid air cells appear clear. SOFT TISSUES/SKULL: No depressed calvarial fracture. Fluid in the nasopharynx and oropharynx. CT HEAD IMPRESSION: No acute hemorrhage or midline shift. Increased ventricle size compared to prior exam.  Correlate with presentation to exclude acute hydrocephalus. Other findings as described. CT CHEST FINDINGS: Mediastinum: Heart is borderline enlarged. Trace pericardial effusion or thickening. Aorta is within normal limits in size. Pulmonary arteries are within normal limits in size. . Lungs/pleura: Central airways are patent. Endotracheal tube with tip terminating in the distal 3rd subglottic trachea. Secretions noted in the trachea. Opacification of segmental and subsegmental bronchi. Ground-glass the confluent airspace disease. Interlobular septal thickening. Small to moderate pleural effusions. No pneumothorax. Soft Tissues/Bones: Suboptimal evaluation for adenopathy without intravenous contrast. Mediastinal adenopathy. Diffuse body wall edema. Scattered degenerative changes noted in the visualized spine without spondylolisthesis. Upper Abdomen: Limited images of the upper abdomen are unremarkable given lack of intravenous contrast. CT CHEST IMPRESSION: 1.   1. Multifocal airspace disease that likely represents a combination of aspiration/pneumonia and pulmonary edema. 2. Other findings as described.      CT CHEST WO CONTRAST    Result Date: 8/27/2021  EXAMINATION: CT OF THE HEAD WITHOUT CONTRAST; CT OF THE CHEST WITHOUT CONTRAST  8/27/2021 7:12 pm TECHNIQUE: CT of the head was performed without the administration of intravenous contrast. Dose modulation, iterative reconstruction, and/or weight based adjustment of the mA/kV was utilized to reduce the radiation dose to as low as reasonably achievable.; CT of the chest was performed without the administration of intravenous contrast. Multiplanar reformatted images are provided for review. Dose modulation, iterative reconstruction, and/or weight based adjustment of the mA/kV was utilized to reduce the radiation dose to as low as reasonably achievable. COMPARISON: CT head 08/27/2020. HISTORY: ORDERING SYSTEM PROVIDED HISTORY: AMS TECHNOLOGIST PROVIDED HISTORY: Has a \"code stroke\" or \"stroke alert\" been called? ->No Reason for exam:->AMS Decision Support Exception - unselect if not a suspected or confirmed emergency medical condition->Emergency Medical Condition (MA) Is the patient pregnant?->No Reason for Exam: Respiratory Distress (Pt brought in per [facility] after call from  for resp distress. O2 sat 70's, BMV in route. Pt will open eyes to voice. Pupils 8mm and fixed. ) Acuity: Acute Type of Exam: Initial; ORDERING SYSTEM PROVIDED HISTORY: AMS, respiratory distress TECHNOLOGIST PROVIDED HISTORY: Reason for exam:->AMS, respiratory distress Is the patient pregnant?->No Reason for Exam: Respiratory Distress (Pt brought in per [facility] after call from  for resp distress. O2 sat 70's, BMV in route. Pt will open eyes to voice. Pupils 8mm and fixed. ) Acuity: Acute Type of Exam: Initial CT HEAD FINDINGS: BRAIN/VENTRICLES: No acute hemorrhage. Periventricular and subcortical hypoattenuation is nonspecific. Interval chronic lacunar infarcts in the left corona radiata, thalamus, and internal capsule. Artifact partially obscures the laureano. Artifact partially obscures the inferior cerebellum.  [place] Mouse white differentiation appears maintained given artifact near the skull base and through the posterior fossa. Mild prominence of the ventricles noted. Overall ventricle size appears increased from prior exam.  There is no midline shift. Basal cisterns appear patent. ORBITS: Visualized orbits appear unremarkable on this unenhanced exam. SINUSES: Mild mucosal thickening of the ethmoid and sphenoid sinuses. Visualized mastoid air cells appear clear. SOFT TISSUES/SKULL: No depressed calvarial fracture. Fluid in the nasopharynx and oropharynx. CT HEAD IMPRESSION: No acute hemorrhage or midline shift. Increased ventricle size compared to prior exam.  Correlate with presentation to exclude acute hydrocephalus. Other findings as described. CT CHEST FINDINGS: Mediastinum: Heart is borderline enlarged. Trace pericardial effusion or thickening. Aorta is within normal limits in size. Pulmonary arteries are within normal limits in size. . Lungs/pleura: Central airways are patent. Endotracheal tube with tip terminating in the distal 3rd subglottic trachea. Secretions noted in the trachea. Opacification of segmental and subsegmental bronchi. Ground-glass the confluent airspace disease. Interlobular septal thickening. Small to moderate pleural effusions. No pneumothorax. Soft Tissues/Bones: Suboptimal evaluation for adenopathy without intravenous contrast. Mediastinal adenopathy. Diffuse body wall edema. Scattered degenerative changes noted in the visualized spine without spondylolisthesis. Upper Abdomen: Limited images of the upper abdomen are unremarkable given lack of intravenous contrast. CT CHEST IMPRESSION: 1.   1. Multifocal airspace disease that likely represents a combination of aspiration/pneumonia and pulmonary edema. 2. Other findings as described. XR CHEST PORTABLE    Result Date: 8/28/2021  EXAMINATION: ONE XRAY VIEW OF THE CHEST 8/28/2021 1:21 am COMPARISON: Chest x-ray dated 02/24/2021. Lc Melba  HISTORY: ORDERING SYSTEM PROVIDED HISTORY: central line and OG placement TECHNOLOGIST PROVIDED HISTORY: Reason for exam:->central line and OG placement FINDINGS: LINES/TUBES/OTHER: Endotracheal tube is noted with its tip approximately 5 cm from the ana. Enteric tube is noted coursing below the level of the diaphragm and within the stomach. Left IJ central venous catheter is noted with its tip projecting over the area of the left brachiocephalic vein. HEART/MEDIASTINUM: The cardiac silhouette is mildly enlarged, but stable. PLEURA/LUNGS: There is perihilar fullness and increased interstitial markings which may reflect pulmonary vascular congestion in the correct clinical setting. There is left basilar reflecting airspace disease, atelectasis or pleural effusion. There is right basilar airspace disease. There is no appreciable pneumothorax. BONES/SOFT TISSUE: No acute abnormality. Endotracheal tube and enteric tube are in satisfactory position. The left IJ central venous catheter tip projects over the area of the left brachiocephalic vein. Right basilar airspace disease. Left basilar opacification reflecting airspace disease, atelectasis or pleural effusion. Conclusions      Summary   *Left ventricle - moderate concentric LVH, normal size and function with EF   of 55%   *Mitral valve - mild regurgitation   *Tricuspid valve - trivial regurgitation   *Bubble study - negative      Signature      ------------------------------------------------------------------   Electronically signed by Golden Chase MD (Interpreting   physician) on 08/31/2020 at 02:48 PM   ------------------------------------------------------------------         Summary   Left ventricular systolic function is normal with ejection fraction   estimated at 55-60 %. No regional wall motion abnormalities are noted. There is mild left ventricular hypertrophy. Normal left ventricular diastolic filling pressure. Moderate mitral regurgitation.    Mild pulmonic regurgitation present. IVC size is dilated (>2.1 cm) but collapses > 50% with respiration   consistent with elevated RA pressure (8 mmHg). **There is a small-moderate bilateral pleural effusion. **There is a small circumferential pericardial effusion noted.       Previous echo done 2020 - EF 55%      Signature      ------------------------------------------------------------------   Electronically signed by Kraig Chamorro MD   (Interpreting physician) on 08/30/2021 at 04:21 PM   ------------------------------------------------------------------
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
## 2021-09-10 NOTE — CARE COORDINATION
Dialysis schedule:   [place] [id number] [address]  [phone]      [facility] at [phone], outpatient treatment to begin on Monday 9/13/2021,patient to be there 1 hour early for consents.     RAHEEL GagnonN, CCM, RN  [facility]  [phone]

## 2021-09-10 NOTE — DISCHARGE INSTR - COC
Continuity of Care Form    Patient Name: Juan Ambrose   :  1975  MRN:  [id number]    Admit date:  2021  Discharge date:  21    Code Status Order: Full Code   Advance Directives:      Admitting Physician:  Gil Patricio DO  PCP: Amber Koch    Discharging Nurse: [facility] Unit/Room#: CVU-2915/2915-01  Discharging Unit Phone Number: [phone]    Emergency Contact:   Extended Emergency Contact Information  Primary Emergency Contact: [address] Phone: [phone]  Relation: Spouse    Past Surgical History:  Past Surgical History:   Procedure Laterality Date    CERVIX SURGERY      laser tx for dysplasia;    EYE SURGERY      FOOT SURGERY Right     FOOT SURGERY Bilateral     FOOT SURGERY Left     IR TUNNELED CATHETER PLACEMENT GREATER THAN 5 YEARS  2021    IR TUNNELED CATHETER PLACEMENT GREATER THAN 5 YEARS 2021 Perla Archibald MD [facility] SPECIAL PROCEDURES       Immunization History:   Immunization History   Administered Date(s) Administered    Influenza 2011    Influenza Virus Vaccine 2011, 10/05/2013    Tdap (Boostrix, Adacel) 2018       Active Problems:  Patient Active Problem List   Diagnosis Code    Type 2 diabetes mellitus, with long-term current use of insulin ([facility] [address] E11.9, Z79.4    Mixed hyperlipidemia E78.2    Migraine headache G43.909    Anemia D64.9    Diabetic foot infection ([address] E11.628, L08.9    Pyogenic inflammation of bone ([address] M86.9    History of medication noncompliance Z91.14    Osteomyelitis of left foot ([address] M86.9    Nephrotic syndrome N04.9    Peripheral edema R60.9    Pulmonary edema J81.1    Right sided numbness R20.0    Tobacco dependence F17.200    Chronic kidney disease (CKD), stage III (moderate) (HCC) N18.30    Chronic diastolic (congestive) heart failure (HCC) I50.32    History of CVA (cerebrovascular accident) Z86.73    Arterial ischemic stroke, ICA, left, acute ([address] C01.074  HTN (hypertension), benign I10    DM (diabetes mellitus), secondary, uncontrolled, w/neurologic complic ([facility] [address] Y65.46, O40.13    Dyslipidemia E78.5    Smoker F17.200    Panic disorder without agoraphobia F41.0    Isolated proteinuria R80.0    Acute on chronic diastolic heart failure (HCC) I50.33    Diabetic peripheral neuropathy (HCC) E11.42    Acute respiratory failure (HCC) J96.00       Isolation/Infection:   Isolation            No Isolation          Patient Infection Status       Infection Onset Added Last Indicated Last Indicated By Review Planned Expiration Resolved Resolved By    None active    Resolved    COVID-19 Rule Out 08/27/21 08/27/21 08/27/21 COVID-19 (Ordered)   08/28/21 Rule-Out Test Resulted    COVID-19 Rule Out 02/23/21 02/23/21 02/23/21 COVID-19, Rapid (Ordered)   02/23/21 Rule-Out Test Resulted    MRSA 04/22/19 04/25/19 04/22/19 Wound Culture   08/29/20 Caroline Conteh RN            Nurse Assessment:  Last Vital Signs: BP (!) 150/75   Pulse 93   Temp 96.8 °F (36 °C) (Temporal)   Resp 18   Ht 5' 6\" (1.676 m)   Wt 184 lb 1.4 oz (83.5 kg)   SpO2 96%   BMI 29.71 kg/m²     Last documented pain score (0-10 scale): Pain Level: 0  Last Weight:   Wt Readings from Last 1 Encounters:   09/10/21 184 lb 1.4 oz (83.5 kg)     Mental Status:  oriented, alert and short term memory loss, poor judgement at times    IV Access:  - Dialysis Catheter  - site  right and subclavian, insertion date: 9/7/21    Nursing Mobility/ADLs:  Walking   Assisted  Transfer  Assisted  Bathing  Assisted  Dressing  Assisted  Toileting  Assisted  Feeding  Independent  Med Admin  Assisted  Med Delivery   whole    Wound Care Documentation and Therapy:  Incision 10/05/13 Other (Comment) Left (Active)   Number of days: 2897        Elimination:  Continence:   · Bowel:  Yes  · Bladder: Yes  Urinary Catheter: None   Colostomy/Ileostomy/Ileal Conduit: No       Date of Last BM: 9/11/21    Intake/Output Summary (Last 24 hours) at 9/10/2021 1347  Last data filed at 9/10/2021 1115  Gross per 24 hour   Intake 590 ml   Output 2000 ml   Net -1410 ml     I/O last 3 completed shifts: In: 350 [P.O.:330; I.V.:20]  Out: 60 [Urine:60]    Safety Concerns: At Risk for Falls    Impairments/Disabilities:      Vision and retinal detachment in april has since been fixed    Nutrition Therapy:  Current Nutrition Therapy:   - Oral Diet:  Dysphagia soft and bite sized    Routes of Feeding: Oral  Liquids: Thin liquids no straws  Daily Fluid Restriction: no  Last Modified Barium Swallow with Video (Video Swallowing Test): not done    Treatments at the Time of Hospital Discharge:   Respiratory Treatments: None  Oxygen Therapy:  is not on home oxygen therapy.   Ventilator:    - No ventilator support    Rehab Therapies: Physical Therapy and Occupational Therapy  Weight Bearing Status/Restrictions: No weight bearing restirctions  Other Medical Equipment (for information only, NOT a DME order):  walker  Other Treatments: none    Patient's personal belongings (please select all that are sent with patient):  None    RN SIGNATURE:  Electronically signed by Tabatha Valles on 9/13/21 at 8:19 AM EDT    CASE MANAGEMENT/SOCIAL WORK SECTION    Inpatient Status Date: 8/27/2021    Readmission Risk Assessment Score:  Readmission Risk              Risk of Unplanned Readmission:  27           Discharging to Facility/ Agency   Name: Discharging to Facility/ [place] [address]   Fax: [phone]  · Report: [phone]    Dialysis Facility (if applicable)   [address] [phone]    · Dialysis Schedule: [facility] at 12:15, treatment starts Monday 9/13/2021 be there 1 hour early    / signature:RAHEEL PryorN, CCM, RN  [facility]  [address] [facility]    Prognosis: Fair    Condition at Discharge: Stable    Rehab Potential (if transferring to Rehab): Good    Recommended Labs or Other Treatments After Discharge: she will be dialysis MWF. She will get labs with them. Physician Certification: I certify the above information and transfer of Anushka Franco  is necessary for the continuing treatment of the diagnosis listed and that she requires [facility] for less 30 days.      Update Admission H&P: No change in H&P    PHYSICIAN SIGNATURE:  Electronically signed by Emily Gallagher MD on 9/13/21 at 8:00 AM EDT

## 2021-09-10 NOTE — PLAN OF CARE
Problem: Cardiovascular  Goal: Hemodynamic stability  Outcome: Ongoing     Problem: Nutrition  Goal: Optimal nutrition therapy  Outcome: Ongoing     Problem: Discharge Planning:  Goal: Participates in care planning  Description: Participates in care planning  Outcome: Ongoing  Goal: Discharged to appropriate level of care  Description: Discharged to appropriate level of care  Outcome: Ongoing  Goal: Ability to perform activities of daily living will improve  Description: Ability to perform activities of daily living will improve  Outcome: Ongoing     Problem: Airway Clearance - Ineffective:  Goal: Ability to maintain a clear airway will improve  Description: Ability to maintain a clear airway will improve  Outcome: Ongoing     Problem: Anxiety/Stress:  Goal: Level of anxiety will decrease  Description: Level of anxiety will decrease  Outcome: Ongoing     Problem: Aspiration:  Goal: Absence of aspiration  Description: Absence of aspiration  Outcome: Ongoing     Problem:  Bowel Function - Altered:  Goal: Bowel elimination is within specified parameters  Description: Bowel elimination is within specified parameters  Outcome: Ongoing     Problem: Cardiac Output - Decreased:  Goal: Hemodynamic stability will improve  Description: Hemodynamic stability will improve  Outcome: Ongoing     Problem: Fluid Volume - Imbalance:  Goal: Absence of imbalanced fluid volume signs and symptoms  Description: Absence of imbalanced fluid volume signs and symptoms  Outcome: Ongoing     Problem: Mental Status - Impaired:  Goal: Mental status will be restored to baseline  Description: Mental status will be restored to baseline  Outcome: Ongoing     Problem: Nutrition Deficit:  Goal: Ability to achieve adequate nutritional intake will improve  Description: Ability to achieve adequate nutritional intake will improve  Outcome: Ongoing     Problem: Pain:  Goal: Pain level will decrease  Description: Pain level will decrease  Outcome: Ongoing  Goal: Recognizes and communicates pain  Description: Recognizes and communicates pain  Outcome: Ongoing  Goal: Control of acute pain  Description: Control of acute pain  Outcome: Ongoing  Goal: Control of chronic pain  Description: Control of chronic pain  Outcome: Ongoing     Problem: Serum Glucose Level - Abnormal:  Goal: Ability to maintain appropriate glucose levels will improve to within specified parameters  Description: Ability to maintain appropriate glucose levels will improve to within specified parameters  Outcome: Ongoing     Problem: Skin Integrity - Impaired:  Goal: Will show no infection signs and symptoms  Description: Will show no infection signs and symptoms  Outcome: Ongoing  Goal: Absence of new skin breakdown  Description: Absence of new skin breakdown  Outcome: Ongoing     Problem: Tissue Perfusion, Altered:  Goal: Circulatory function within specified parameters  Description: Circulatory function within specified parameters  Outcome: Ongoing     Problem: Tissue Perfusion - Cardiopulmonary, Altered:  Goal: Absence of angina  Description: Absence of angina  Outcome: Ongoing  Goal: Hemodynamic stability will improve  Description: Hemodynamic stability will improve  Outcome: Ongoing
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
## 2021-09-11 LAB
A/G RATIO: 0.8 (ref 1.1–2.2)
ALBUMIN SERPL-MCNC: 2.7 G/DL (ref 3.4–5)
ALP BLD-CCNC: 115 U/L (ref 40–129)
ALT SERPL-CCNC: <5 U/L (ref 10–40)
ANION GAP SERPL CALCULATED.3IONS-SCNC: 12 MMOL/L (ref 3–16)
ANISOCYTOSIS: ABNORMAL
APTT: 31.3 SEC (ref 26.2–38.6)
AST SERPL-CCNC: 11 U/L (ref 15–37)
BASOPHILS ABSOLUTE: 0.4 K/UL (ref 0–0.2)
BASOPHILS RELATIVE PERCENT: 2 %
BILIRUB SERPL-MCNC: <0.2 MG/DL (ref 0–1)
BUN BLDV-MCNC: 26 MG/DL (ref 7–20)
CALCIUM IONIZED: 1.14 MMOL/L (ref 1.12–1.32)
CALCIUM SERPL-MCNC: 8.4 MG/DL (ref 8.3–10.6)
CHLORIDE BLD-SCNC: 103 MMOL/L (ref 99–110)
CO2: 16 MMOL/L (ref 21–32)
CREAT SERPL-MCNC: 4.2 MG/DL (ref 0.6–1.1)
EOSINOPHILS ABSOLUTE: 0.2 K/UL (ref 0–0.6)
EOSINOPHILS RELATIVE PERCENT: 1 %
GFR AFRICAN AMERICAN: 14
GFR NON-AFRICAN AMERICAN: 11
GLOBULIN: 3.5 G/DL
GLUCOSE BLD-MCNC: 153 MG/DL (ref 70–99)
GLUCOSE BLD-MCNC: 196 MG/DL (ref 70–99)
GLUCOSE BLD-MCNC: 209 MG/DL (ref 70–99)
HCT VFR BLD CALC: 30.3 % (ref 36–48)
HEMOGLOBIN: 9.7 G/DL (ref 12–16)
LACTIC ACID: 0.7 MMOL/L (ref 0.4–2)
LYMPHOCYTES ABSOLUTE: 2.9 K/UL (ref 1–5.1)
LYMPHOCYTES RELATIVE PERCENT: 16 %
MACROCYTES: ABNORMAL
MAGNESIUM: 1.9 MG/DL (ref 1.8–2.4)
MCH RBC QN AUTO: 28.5 PG (ref 26–34)
MCHC RBC AUTO-ENTMCNC: 32 G/DL (ref 31–36)
MCV RBC AUTO: 89.1 FL (ref 80–100)
MONOCYTES ABSOLUTE: 1.6 K/UL (ref 0–1.3)
MONOCYTES RELATIVE PERCENT: 9 %
NEUTROPHILS ABSOLUTE: 13.2 K/UL (ref 1.7–7.7)
NEUTROPHILS RELATIVE PERCENT: 72 %
PDW BLD-RTO: 16.1 % (ref 12.4–15.4)
PERFORMED ON: ABNORMAL
PERFORMED ON: ABNORMAL
PH VENOUS: 7.38 (ref 7.35–7.45)
PHOSPHORUS: 2.3 MG/DL (ref 2.5–4.9)
PLATELET # BLD: 341 K/UL (ref 135–450)
PLATELET SLIDE REVIEW: ADEQUATE
PMV BLD AUTO: 8.6 FL (ref 5–10.5)
POLYCHROMASIA: ABNORMAL
POTASSIUM SERPL-SCNC: 4.2 MMOL/L (ref 3.5–5.1)
RBC # BLD: 3.4 M/UL (ref 4–5.2)
SLIDE REVIEW: ABNORMAL
SODIUM BLD-SCNC: 131 MMOL/L (ref 136–145)
TOTAL CK: 35 U/L (ref 26–192)
TOTAL PROTEIN: 6.2 G/DL (ref 6.4–8.2)
WBC # BLD: 18.3 K/UL (ref 4–11)

## 2021-09-11 PROCEDURE — 2000000000 HC ICU R&B

## 2021-09-11 PROCEDURE — 2500000003 HC RX 250 WO HCPCS: Performed by: INTERNAL MEDICINE

## 2021-09-11 PROCEDURE — 82550 ASSAY OF CK (CPK): CPT

## 2021-09-11 PROCEDURE — 82330 ASSAY OF CALCIUM: CPT

## 2021-09-11 PROCEDURE — 84100 ASSAY OF PHOSPHORUS: CPT

## 2021-09-11 PROCEDURE — 80053 COMPREHEN METABOLIC PANEL: CPT

## 2021-09-11 PROCEDURE — 85730 THROMBOPLASTIN TIME PARTIAL: CPT

## 2021-09-11 PROCEDURE — 6370000000 HC RX 637 (ALT 250 FOR IP): Performed by: INTERNAL MEDICINE

## 2021-09-11 PROCEDURE — 85025 COMPLETE CBC W/AUTO DIFF WBC: CPT

## 2021-09-11 PROCEDURE — 6360000002 HC RX W HCPCS: Performed by: INTERNAL MEDICINE

## 2021-09-11 PROCEDURE — 6370000000 HC RX 637 (ALT 250 FOR IP): Performed by: PSYCHIATRY & NEUROLOGY

## 2021-09-11 PROCEDURE — 83605 ASSAY OF LACTIC ACID: CPT

## 2021-09-11 PROCEDURE — 6370000000 HC RX 637 (ALT 250 FOR IP): Performed by: NURSE PRACTITIONER

## 2021-09-11 PROCEDURE — 83735 ASSAY OF MAGNESIUM: CPT

## 2021-09-11 PROCEDURE — 2580000003 HC RX 258: Performed by: INTERNAL MEDICINE

## 2021-09-11 RX ADMIN — MICONAZOLE NITRATE: 20 POWDER TOPICAL at 21:45

## 2021-09-11 RX ADMIN — FLUOXETINE 20 MG: 20 CAPSULE ORAL at 08:41

## 2021-09-11 RX ADMIN — HEPARIN SODIUM 5000 UNITS: 5000 INJECTION INTRAVENOUS; SUBCUTANEOUS at 20:01

## 2021-09-11 RX ADMIN — HEPARIN SODIUM 5000 UNITS: 5000 INJECTION INTRAVENOUS; SUBCUTANEOUS at 08:00

## 2021-09-11 RX ADMIN — INSULIN LISPRO 1 UNITS: 100 INJECTION, SOLUTION INTRAVENOUS; SUBCUTANEOUS at 20:01

## 2021-09-11 RX ADMIN — PANTOPRAZOLE SODIUM 40 MG: 40 TABLET, DELAYED RELEASE ORAL at 08:37

## 2021-09-11 RX ADMIN — PANTOPRAZOLE SODIUM 40 MG: 40 TABLET, DELAYED RELEASE ORAL at 20:01

## 2021-09-11 RX ADMIN — FUROSEMIDE 40 MG: 40 TABLET ORAL at 08:37

## 2021-09-11 RX ADMIN — AMLODIPINE BESYLATE 10 MG: 5 TABLET ORAL at 08:37

## 2021-09-11 RX ADMIN — Medication 10 ML: at 20:02

## 2021-09-11 RX ADMIN — MICONAZOLE NITRATE: 20 POWDER TOPICAL at 08:00

## 2021-09-11 RX ADMIN — PROPRANOLOL HYDROCHLORIDE 80 MG: 80 CAPSULE, EXTENDED RELEASE ORAL at 14:14

## 2021-09-11 RX ADMIN — Medication 10 ML: at 08:43

## 2021-09-11 RX ADMIN — ASPIRIN 81 MG: 81 TABLET, COATED ORAL at 08:36

## 2021-09-11 RX ADMIN — INSULIN LISPRO 1 UNITS: 100 INJECTION, SOLUTION INTRAVENOUS; SUBCUTANEOUS at 14:13

## 2021-09-11 RX ADMIN — ATORVASTATIN CALCIUM 40 MG: 40 TABLET, FILM COATED ORAL at 20:01

## 2021-09-11 ASSESSMENT — PAIN SCALES - GENERAL
PAINLEVEL_OUTOF10: 0
PAINLEVEL_OUTOF10: 0

## 2021-09-11 NOTE — PROGRESS NOTES
Patient has been removing heart monitor, and will not leave it on. Despite attempting to explain medical reason to wear. Family at bedside but she has been giving them a hard time as well. No further changes.
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
## 2021-09-11 NOTE — PLAN OF CARE
Problem: Airway Clearance - Ineffective:  Goal: Ability to maintain a clear airway will improve  Description: Ability to maintain a clear airway will improve  Outcome: Ongoing  Note: Pt admitted for respiratory failure. Pt was sedated on ventilator. Pt currently SPO2 > 90% on room air. Pt denies any SOB. See flow sheet for respiratory details. Will continue to monitor. Call light within reach. Problem: Falls - Risk of:  Goal: Will remain free from falls  Description: Will remain free from falls  Outcome: Ongoing  Note: Pt has remained free from any falls so far this shift. Bed alarm activated. Non-skid socks on. Bed in lowest and locked position. Belongings within reach. Will continue to monitor. Call light within reach. Problem: Injury - Risk of, Physical Injury:  Goal: Will remain free from falls  Description: Will remain free from falls  Outcome: Ongoing  Note: Pt has remained free from any falls so far this shift. Bed alarm activated. Non-skid socks on. Bed in lowest and locked position. Belongings within reach. Will continue to monitor. Call light within reach.

## 2021-09-11 NOTE — PROGRESS NOTES
MD Lincoln Watts MD Limmie Parsley, MD               Office: [phone]                      Fax: [phone]             [facility]                   NEPHROLOGY CVU INPATIENT PROGRESS NOTE:     PATIENT NAME: Ki Coreas  : 1975  MRN: [id number]     Nephrology treatment plan update. Cardiac cath done, non-obstructive CAD. EF-60%  TDC placed   Outpt placement at [facility] MWF  - HD yesterday. Around EDW. Next HD Monday. - switched to po Lasix, decreased to 40mg daily. Now appearing euvolemic.    - RIA IV with HD MWF    -Patient has advanced background of chronic kidney disease stage IV from diabetic nephropathy.  -Follows with me.   -Likely progression of kidney disease. Medications reviewed. Hold Ace inhibitors    Anemia-hgb better. RIA with HD. Poor nutritional status.  -Albumin is 2.5. Discussed with RN. RECOMMENDATIONS:   Next HD Monday  Outpt HD placement MWF at [facility] to po Lasix. Okay for discharge from renal perspective. IMPRESSION:       Admitted for:  Acute respiratory failure ([address] [J96.00]  Encephalopathy [G93.40]  Respiratory failure with hypoxia, unspecified chronicity ([address] [J96.91]  Pneumonia due to infectious organism, unspecified laterality, unspecified part of lung [J18.9]      KINZA (on proteinuric CKD: 4): Worsening. Started on HD. Possible ESRD.   - BL Scr-last known 2.5, in 2021,   ---> 4.6 on admission  -: Etiology of KINZA -presumed ATN in the setting of pneumonia, unclear if it this is advancing CKD    History of nephrotic range proteinuria,   - thought to be from diabetic nephropathy With CKD stage III -> so high risk of advanced CKD,  -Follows with me  -Noncompliance, last follow-up was 2021 then lost for follow-up  -echo results -  IVC size is dilated (>2.1 cm) but collapses > 50% with respiration consistent with elevated RA pressure (8 mmHg).    -Last echo-8/2020  moderate concentric LVH, normal size and function with EF of 62%, normal diastolic function      Associated problems:   Hypokalemia-needing repletion. Better. Acidosis, non-anion gap-HD today  Hypomagnesemia-better  Hypophosphatemia-better  Anemia, chronic disease-RIA with HD      Other major problems: Management per primary and other consulting teams.   //Acute hypoxic respiratory failure -needing intubation. Extubated  // Multifocal airspace disease that likely represents a combination of aspiration/pneumonia and pulmonary edema  //Fluid overload  -COVID was ruled out  //History of uncontrolled diabetes last A1c was 11.3    // Hypertension better controlled,       Hospital Problems         Last Modified POA    Pulmonary edema 8/28/2021 Yes    Chronic kidney disease (CKD), stage III (moderate) (HCC) 8/28/2021 Yes    Chronic diastolic (congestive) heart failure ([address] 8/28/2021 Yes    Panic disorder 9/10/2021 Yes    Acute on chronic diastolic heart failure ([address] 8/28/2021 Yes    Acute respiratory failure ([address] 8/27/2021 Yes    Depression 9/10/2021 Yes           Thank you for allowing me to participate in this patient's care. Please do not hesitate to contact me anytime. We will follow along with you. Devante Ruiz MD,  [facility] [address] [place] [address]: [phone] or [employer]  Fax: [phone]            ========================================================   ========================================================   Subjective:   No complaints today        Past medical, Surgical, Social, Family medical history reviewed by me. MEDICATIONS: reviewed by me. Medications Prior to Admission:  No current facility-administered medications on file prior to encounter.      Current Outpatient Medications on File Prior to Encounter   Medication Sig Dispense Refill    Dulaglutide 0.75 MG/0.5ML SOPN Inject 0.75 mg into the skin once a week 4 pen 1    ibuprofen (ADVIL;MOTRIN) 200 MG CAPS Take 1 capsule by mouth      furosemide (LASIX) 80 MG tablet Take 80 mg by mouth every morning 80mg in the morning 40mg in the evening      amLODIPine (NORVASC) 10 MG tablet Take 1 tablet by mouth daily 30 tablet 1    furosemide (LASIX) 40 MG tablet Take 1 tablet by mouth every evening 30 tablet 1    spironolactone (ALDACTONE) 50 MG tablet Take 1 tablet by mouth daily 30 tablet 2    insulin glargine (LANTUS;BASAGLAR) 100 UNIT/ML injection pen Inject 30 Units into the skin daily 4 pen 2    lisinopril (PRINIVIL;ZESTRIL) 40 MG tablet Take 1 tablet by mouth daily 30 tablet 2    atorvastatin (LIPITOR) 40 MG tablet Take 1 tablet by mouth nightly 30 tablet 3    Insulin Pen Needle 29G X 12.7MM MISC 1 each by Does not apply route daily 100 each 5    propranolol (INDERAL LA) 80 MG extended release capsule Take 1 capsule by mouth every morning 30 capsule 2    LORazepam (ATIVAN) 0.5 MG tablet Take 0.5 mg by mouth 2 times daily as needed for Anxiety.  aspirin 81 MG EC tablet Take 1 tablet by mouth daily 30 tablet 3    pregabalin (LYRICA) 75 MG capsule Take 1 capsule by mouth nightly for 7 days.  7 capsule 0    sertraline (ZOLOFT) 50 MG tablet Take 1 tablet by mouth daily 30 tablet 3    glucose monitoring kit (FREESTYLE) monitoring kit 1 kit by Does not apply route daily 1 kit 0    insulin lispro, 1 Unit Dial, 100 UNIT/ML SOPN Inject 0-6 Units into the skin 3 times daily (with meals) **Corrective Low Dose Algorithm**  Glucose: Dose:               No Insulin  140-199 1 Unit  200-249 2 Units  250-299 3 Units  300-349 4 Units  350-399 5 Units  Over 399 6 Units (Patient taking differently: Inject 6-12 Units into the skin 3 times daily (with meals) **Corrective Low Dose Algorithm**  Glucose: Dose:               No Insulin  140-199 1 Unit  200-249 2 Units  250-299 3 Units  300-349 4 Units  350-399 5 Units  Over 399 6 Units) 3 pen 0    glucose blood VI test strips (VICTORY AGM-4000 TEST) strip Test four times daily until controlled and then three times daily.   Code 250.02  ONE TOUCH ULTRA MONITOR 100 strip 12         Current Facility-Administered Medications:     FLUoxetine (PROZAC) capsule 20 mg, 20 mg, Oral, Daily, Juan Antonio Dykes MD, 20 mg at 09/11/21 0841    miconazole (MICOTIN) 2 % powder, , Topical, BID, Eda Beavers MD, Given at 09/10/21 2115    aspirin EC tablet 81 mg, 81 mg, Oral, Daily, Cornel Ballesteros, APRN - CNP, 81 mg at 09/11/21 0836    insulin lispro (1 Unit Dial) 0-6 Units, 0-6 Units, SubCUTAneous, TID WC, Hayde Felix MD, 2 Units at 09/10/21 1653    insulin lispro (1 Unit Dial) 0-3 Units, 0-3 Units, SubCUTAneous, Nightly, Hayde Felix MD, 1 Units at 09/10/21 2114    pantoprazole (PROTONIX) tablet 40 mg, 40 mg, Oral, BID, Eda Beavers MD, 40 mg at 09/11/21 0837    haloperidol lactate (HALDOL) injection 5 mg, 5 mg, IntraMUSCular, Q12H PRN, Eda Beavers MD    heparin (porcine) injection 3,600 Units, 3,600 Units, IntraCATHeter, PRN, Miranda Arroyo MD, 3,600 Units at 09/08/21 1000    furosemide (LASIX) tablet 40 mg, 40 mg, Oral, Daily, Dillon Rangel MD, 40 mg at 09/11/21 0837    sodium chloride flush 0.9 % injection 5-40 mL, 5-40 mL, IntraVENous, 2 times per day, Andrew Pozo MD, 10 mL at 09/11/21 0843    sodium chloride flush 0.9 % injection 5-40 mL, 5-40 mL, IntraVENous, PRN, Andrew Pozo MD, 10 mL at 09/09/21 0825    0.9 % sodium chloride infusion, 25 mL, IntraVENous, PRN, Andrew Pozo MD    acetaminophen (TYLENOL) tablet 650 mg, 650 mg, Oral, Q4H PRN, Andrew Pozo MD    oxyCODONE-acetaminophen (PERCOCET) 5-325 MG per tablet 1 tablet, 1 tablet, Oral, Q4H PRN, 1 tablet at 09/09/21 0231 **OR** oxyCODONE-acetaminophen (PERCOCET) 5-325 MG per tablet 2 tablet, 2 tablet, Oral, Q4H PRN, Andrew Pozo MD    amLODIPine [facility]) tablet 10 mg, 10 mg, Oral, Daily, Eda Beavers MD, 10 mg at 09/11/21 6185   atorvastatin (LIPITOR) tablet 40 mg, 40 mg, Oral, Nightly, Lita Colon MD, 40 mg at 09/10/21 2112    propranolol (INDERAL LA) extended release capsule 80 mg, 80 mg, Oral, QAM, Lita Colon MD, 80 mg at 09/10/21 1355    epoetin yohana (EPOGEN;PROCRIT) injection 10,000 Units, 10,000 Units, IntraVENous, Q MWF, Dianah Najjar, MD, 10,000 Units at 09/10/21 1356    hydrALAZINE (APRESOLINE) injection 20 mg, 20 mg, IntraVENous, Q4H PRN, Trina Shin MD, 20 mg at 09/08/21 2335    0.9 % sodium chloride infusion, , IntraVENous, PRN, Deejay Felix MD    sodium chloride flush 0.9 % injection 5-40 mL, 5-40 mL, IntraVENous, Q12H, Jack Nino MD, 10 mL at 09/10/21 1108    sodium chloride flush 0.9 % injection 5-40 mL, 5-40 mL, IntraVENous, PRN, Deejay Felix MD    fluticasone (FLONASE) 50 MCG/ACT nasal spray 1 spray, 1 spray, Each Nostril, PRN, Melany Perry MD    LORazepam (ATIVAN) injection 0.5 mg, 0.5 mg, IntraVENous, Q4H PRN, Abbe Redman MD, 0.5 mg at 09/09/21 0232    cloNIDine (CATAPRES) 0.2 MG/24HR 1 patch, 1 patch, TransDERmal, Weekly, Trina Shin MD, 1 patch at 09/10/21 1355    heparin (porcine) injection 5,000 Units, 5,000 Units, SubCUTAneous, 3 times per day, Tamiko Santamaria MD, 5,000 Units at 09/11/21 0800    albumin human 25 % IV solution 25 g, 25 g, IntraVENous, PRN, Geo Brown MD, Stopped at 09/01/21 1439    glucose (GLUTOSE) 40 % oral gel 15 g, 15 g, Oral, PRN, Brittany Dejesus, DO    dextrose 50 % IV solution, 12.5 g, IntraVENous, PRN, Brittany Dejesus, DO    glucagon (rDNA) injection 1 mg, 1 mg, IntraMUSCular, PRN, Brittany Dejesus DO    dextrose 5 % solution, 100 mL/hr, IntraVENous, PRN, Brittany Dejesus DO    fentaNYL (SUBLIMAZE) injection 50 mcg, 50 mcg, IntraVENous, Q1H PRN, Robi Aguiar MD    sodium chloride flush 0.9 % injection 5-40 mL, 5-40 mL, IntraVENous, 2 times per day, Brittany Dejesus DO, 10 mL at 09/09/21 2200    sodium chloride flush distension  Cardiovascular: Regular rate and ryhthm, normal S1 and S2,    Respiratory: clear  Abdomen:  distended. Normal bowel sounds. : Meza catheter is inserted, draining urine  Skin: no rash,  bruises on visible body area  Musculoskeletal: No clubbing or cyanosis of digits. and Normocephalic. Extremitties: no peripheral edema, no deformities. Neurologic:Unable to obtain as intubated/sedated. Psychiatric: Unable to obtain as intubated/sedated. DATA:  Diagnostic tests reviewed by me for today's visit:   (AS NEEDED FOR MY EVALUATION AND MANAGEMENT). Recent Labs     09/09/21  0550 09/10/21  0524 09/11/21  0341   WBC 20.2* 17.9* 18.3*   HCT 30.9* 29.7* 30.3*    355 341     Iron Saturation:  No components found for: PERCENTFE  FERRITIN:    Lab Results   Component Value Date    FERRITIN 112.0 08/28/2021     IRON:    Lab Results   Component Value Date    IRON 22 08/28/2021     TIBC:    Lab Results   Component Value Date    TIBC 184 08/28/2021       Recent Labs     09/09/21  0550 09/10/21  0524 09/11/21  0341   * 131* 131*   K 3.8 3.9 4.2    101 103   CO2 17* 17* 16*   BUN 24* 34* 26*   CREATININE 3.7* 4.8* 4.2*     Recent Labs     09/09/21  0550 09/10/21  0524 09/11/21  0341   CALCIUM 8.7 8.7 8.4   MG 2.00 2.00 1.90   PHOS 3.3 3.8 2.3*     No results for input(s): PH, PCO2, PO2 in the last 72 hours.     Invalid input(s): Chris Reece    ABG:  No results found for: PH, PCO2, PO2, HCO3, BE, THGB, TCO2, O2SAT  VBG:    Lab Results   Component Value Date    PHVEN 7.378 09/11/2021    YYK2DCP 34.3 02/23/2021    BEVEN -4.6 02/23/2021    Y8LHZVXL 100 02/23/2021       LDH:  No results found for: LDH  Uric Acid:  No results found for: LABURIC, URICACID    PT/INR:    Lab Results   Component Value Date    PROTIME 11.3 09/07/2021    INR 1.00 09/07/2021     Warfarin PT/INR:  No components found for: PTPATWAR, PTINRWAR  PTT:    Lab Results   Component Value Date    APTT 31.3 weight based adjustment of the mA/kV was utilized to reduce the radiation dose to as low as reasonably achievable. COMPARISON: CT head 08/27/2020. HISTORY: ORDERING SYSTEM PROVIDED HISTORY: AMS TECHNOLOGIST PROVIDED HISTORY: Has a \"code stroke\" or \"stroke alert\" been called? ->No Reason for exam:->AMS Decision Support Exception - unselect if not a suspected or confirmed emergency medical condition->Emergency Medical Condition (MA) Is the patient pregnant?->No Reason for Exam: Respiratory Distress (Pt brought in per [facility] after call from  for resp distress. O2 sat 70's, BMV in route. Pt will open eyes to voice. Pupils 8mm and fixed. ) Acuity: Acute Type of Exam: Initial; ORDERING SYSTEM PROVIDED HISTORY: AMS, respiratory distress TECHNOLOGIST PROVIDED HISTORY: Reason for exam:->AMS, respiratory distress Is the patient pregnant?->No Reason for Exam: Respiratory Distress (Pt brought in per [facility] after call from  for resp distress. O2 sat 70's, BMV in route. Pt will open eyes to voice. Pupils 8mm and fixed. ) Acuity: Acute Type of Exam: Initial CT HEAD FINDINGS: BRAIN/VENTRICLES: No acute hemorrhage. Periventricular and subcortical hypoattenuation is nonspecific. Interval chronic lacunar infarcts in the left corona radiata, thalamus, and internal capsule. Artifact partially obscures the laureano. Artifact partially obscures the inferior cerebellum. Joseph Pipe white differentiation appears maintained given artifact near the skull base and through the posterior fossa. Mild prominence of the ventricles noted. Overall ventricle size appears increased from prior exam.  There is no midline shift. Basal cisterns appear patent. ORBITS: Visualized orbits appear unremarkable on this unenhanced exam. SINUSES: Mild mucosal thickening of the ethmoid and sphenoid sinuses. Visualized mastoid air cells appear clear. SOFT TISSUES/SKULL: No depressed calvarial fracture.   Fluid in the nasopharynx and oropharynx. CT HEAD IMPRESSION: No acute hemorrhage or midline shift. Increased ventricle size compared to prior exam.  Correlate with presentation to exclude acute hydrocephalus. Other findings as described. CT CHEST FINDINGS: Mediastinum: Heart is borderline enlarged. Trace pericardial effusion or thickening. Aorta is within normal limits in size. Pulmonary arteries are within normal limits in size. . Lungs/pleura: Central airways are patent. Endotracheal tube with tip terminating in the distal 3rd subglottic trachea. Secretions noted in the trachea. Opacification of segmental and subsegmental bronchi. Ground-glass the confluent airspace disease. Interlobular septal thickening. Small to moderate pleural effusions. No pneumothorax. Soft Tissues/Bones: Suboptimal evaluation for adenopathy without intravenous contrast. Mediastinal adenopathy. Diffuse body wall edema. Scattered degenerative changes noted in the visualized spine without spondylolisthesis. Upper Abdomen: Limited images of the upper abdomen are unremarkable given lack of intravenous contrast. CT CHEST IMPRESSION: 1.   1. Multifocal airspace disease that likely represents a combination of aspiration/pneumonia and pulmonary edema. 2. Other findings as described. XR CHEST PORTABLE    Result Date: 8/28/2021  EXAMINATION: ONE XRAY VIEW OF THE CHEST 8/28/2021 1:21 am COMPARISON: Chest x-ray dated 02/24/2021. Garrett Palma HISTORY: ORDERING SYSTEM PROVIDED HISTORY: central line and OG placement TECHNOLOGIST PROVIDED HISTORY: Reason for exam:->central line and OG placement FINDINGS: LINES/TUBES/OTHER: Endotracheal tube is noted with its tip approximately 5 cm from the ana. Enteric tube is noted coursing below the level of the diaphragm and within the stomach. Left IJ central venous catheter is noted with its tip projecting over the area of the left brachiocephalic vein. HEART/MEDIASTINUM: The cardiac silhouette is mildly enlarged, but stable.  PLEURA/LUNGS:

## 2021-09-12 LAB
A/G RATIO: 0.9 (ref 1.1–2.2)
ALBUMIN SERPL-MCNC: 3 G/DL (ref 3.4–5)
ALP BLD-CCNC: 115 U/L (ref 40–129)
ALT SERPL-CCNC: <5 U/L (ref 10–40)
ANION GAP SERPL CALCULATED.3IONS-SCNC: 12 MMOL/L (ref 3–16)
APTT: 29.1 SEC (ref 26.2–38.6)
AST SERPL-CCNC: 12 U/L (ref 15–37)
BILIRUB SERPL-MCNC: <0.2 MG/DL (ref 0–1)
BUN BLDV-MCNC: 33 MG/DL (ref 7–20)
CALCIUM IONIZED: 1.18 MMOL/L (ref 1.12–1.32)
CALCIUM SERPL-MCNC: 8.7 MG/DL (ref 8.3–10.6)
CHLORIDE BLD-SCNC: 104 MMOL/L (ref 99–110)
CO2: 18 MMOL/L (ref 21–32)
CREAT SERPL-MCNC: 5.5 MG/DL (ref 0.6–1.1)
GFR AFRICAN AMERICAN: 10
GFR NON-AFRICAN AMERICAN: 8
GLOBULIN: 3.2 G/DL
GLUCOSE BLD-MCNC: 151 MG/DL (ref 70–99)
GLUCOSE BLD-MCNC: 153 MG/DL (ref 70–99)
GLUCOSE BLD-MCNC: 166 MG/DL (ref 70–99)
GLUCOSE BLD-MCNC: 185 MG/DL (ref 70–99)
GLUCOSE BLD-MCNC: 217 MG/DL (ref 70–99)
LACTIC ACID: 0.6 MMOL/L (ref 0.4–2)
MAGNESIUM: 1.9 MG/DL (ref 1.8–2.4)
PERFORMED ON: ABNORMAL
PH VENOUS: 7.26 (ref 7.35–7.45)
PHOSPHORUS: 3.7 MG/DL (ref 2.5–4.9)
POTASSIUM SERPL-SCNC: 4.3 MMOL/L (ref 3.5–5.1)
SODIUM BLD-SCNC: 134 MMOL/L (ref 136–145)
TOTAL CK: 39 U/L (ref 26–192)
TOTAL PROTEIN: 6.2 G/DL (ref 6.4–8.2)

## 2021-09-12 PROCEDURE — 83605 ASSAY OF LACTIC ACID: CPT

## 2021-09-12 PROCEDURE — 6370000000 HC RX 637 (ALT 250 FOR IP): Performed by: INTERNAL MEDICINE

## 2021-09-12 PROCEDURE — 2580000003 HC RX 258: Performed by: INTERNAL MEDICINE

## 2021-09-12 PROCEDURE — 6360000002 HC RX W HCPCS: Performed by: INTERNAL MEDICINE

## 2021-09-12 PROCEDURE — 80053 COMPREHEN METABOLIC PANEL: CPT

## 2021-09-12 PROCEDURE — 83735 ASSAY OF MAGNESIUM: CPT

## 2021-09-12 PROCEDURE — 82330 ASSAY OF CALCIUM: CPT

## 2021-09-12 PROCEDURE — 82550 ASSAY OF CK (CPK): CPT

## 2021-09-12 PROCEDURE — 85025 COMPLETE CBC W/AUTO DIFF WBC: CPT

## 2021-09-12 PROCEDURE — 6370000000 HC RX 637 (ALT 250 FOR IP): Performed by: NURSE PRACTITIONER

## 2021-09-12 PROCEDURE — 6370000000 HC RX 637 (ALT 250 FOR IP): Performed by: PSYCHIATRY & NEUROLOGY

## 2021-09-12 PROCEDURE — 85730 THROMBOPLASTIN TIME PARTIAL: CPT

## 2021-09-12 PROCEDURE — 2000000000 HC ICU R&B

## 2021-09-12 PROCEDURE — 84100 ASSAY OF PHOSPHORUS: CPT

## 2021-09-12 RX ADMIN — INSULIN LISPRO 1 UNITS: 100 INJECTION, SOLUTION INTRAVENOUS; SUBCUTANEOUS at 08:29

## 2021-09-12 RX ADMIN — AMLODIPINE BESYLATE 10 MG: 5 TABLET ORAL at 08:24

## 2021-09-12 RX ADMIN — FLUOXETINE 20 MG: 20 CAPSULE ORAL at 08:25

## 2021-09-12 RX ADMIN — ATORVASTATIN CALCIUM 40 MG: 40 TABLET, FILM COATED ORAL at 21:50

## 2021-09-12 RX ADMIN — FUROSEMIDE 40 MG: 40 TABLET ORAL at 08:25

## 2021-09-12 RX ADMIN — ASPIRIN 81 MG: 81 TABLET, COATED ORAL at 08:25

## 2021-09-12 RX ADMIN — Medication 10 ML: at 08:25

## 2021-09-12 RX ADMIN — MICONAZOLE NITRATE: 20 POWDER TOPICAL at 08:30

## 2021-09-12 RX ADMIN — HEPARIN SODIUM 5000 UNITS: 5000 INJECTION INTRAVENOUS; SUBCUTANEOUS at 05:20

## 2021-09-12 RX ADMIN — INSULIN LISPRO 2 UNITS: 100 INJECTION, SOLUTION INTRAVENOUS; SUBCUTANEOUS at 16:54

## 2021-09-12 RX ADMIN — INSULIN LISPRO 1 UNITS: 100 INJECTION, SOLUTION INTRAVENOUS; SUBCUTANEOUS at 11:54

## 2021-09-12 RX ADMIN — Medication 10 ML: at 21:51

## 2021-09-12 RX ADMIN — HEPARIN SODIUM 5000 UNITS: 5000 INJECTION INTRAVENOUS; SUBCUTANEOUS at 14:35

## 2021-09-12 RX ADMIN — MICONAZOLE NITRATE: 20 POWDER TOPICAL at 21:51

## 2021-09-12 RX ADMIN — PANTOPRAZOLE SODIUM 40 MG: 40 TABLET, DELAYED RELEASE ORAL at 21:50

## 2021-09-12 RX ADMIN — HEPARIN SODIUM 5000 UNITS: 5000 INJECTION INTRAVENOUS; SUBCUTANEOUS at 21:51

## 2021-09-12 RX ADMIN — INSULIN LISPRO 1 UNITS: 100 INJECTION, SOLUTION INTRAVENOUS; SUBCUTANEOUS at 21:51

## 2021-09-12 RX ADMIN — PROPRANOLOL HYDROCHLORIDE 80 MG: 80 CAPSULE, EXTENDED RELEASE ORAL at 08:25

## 2021-09-12 RX ADMIN — PANTOPRAZOLE SODIUM 40 MG: 40 TABLET, DELAYED RELEASE ORAL at 08:24

## 2021-09-12 ASSESSMENT — PAIN SCALES - GENERAL
PAINLEVEL_OUTOF10: 0
PAINLEVEL_OUTOF10: 0
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
## 2021-09-12 NOTE — PROGRESS NOTES
MD Jaiden Lowe MD Baron Cast, MD               Office: [phone]                      Fax: [phone]             [facility]                   NEPHROLOGY CVU INPATIENT PROGRESS NOTE:     PATIENT NAME: Toribio Page  : 1975  MRN: [id number]     Nephrology treatment plan update. Cardiac cath done, non-obstructive CAD. EF-60%  TDC placed   Outpt placement at [facility]. Next HD Monday. - switched to po Lasix, decreased to 40mg daily. Now appearing euvolemic.    - RIA IV with HD MWF    -Patient has advanced background of chronic kidney disease stage IV from diabetic nephropathy.  -Follows with me.   -Likely progression of kidney disease. Medications reviewed. Hold Ace inhibitors    Anemia-hgb better. RIA with HD. Poor nutritional status.  -Albumin is 2.5. Discussed with RN. RECOMMENDATIONS:   Next HD Monday  Outpt HD placement MWF at [facility] to po Lasix. Okay for discharge from renal perspective. Awaiting pre-cert. IMPRESSION:       Admitted for:  Acute respiratory failure ([address] [J96.00]  Encephalopathy [G93.40]  Respiratory failure with hypoxia, unspecified chronicity ([address] [J96.91]  Pneumonia due to infectious organism, unspecified laterality, unspecified part of lung [J18.9]      KINZA (on proteinuric CKD: 4): Worsening. Started on HD. Possible ESRD.   - BL Scr-last known 2.5, in 2021,   ---> 4.6 on admission  -: Etiology of KINZA -presumed ATN in the setting of pneumonia, unclear if it this is advancing CKD    History of nephrotic range proteinuria,   - thought to be from diabetic nephropathy With CKD stage III -> so high risk of advanced CKD,  -Follows with me  -Noncompliance, last follow-up was 2021 then lost for follow-up  -echo results -  IVC size is dilated (>2.1 cm) but collapses > 50% with respiration consistent with elevated RA pressure (8 mmHg).    -Last echo-8/2020  moderate concentric LVH, normal size and function with EF of 18%, normal diastolic function      Associated problems:   Hypokalemia-needing repletion. Better. Acidosis, non-anion gap-HD today  Hypomagnesemia-better  Hypophosphatemia-better  Anemia, chronic disease-RIA with HD      Other major problems: Management per primary and other consulting teams.   //Acute hypoxic respiratory failure -needing intubation. Extubated  // Multifocal airspace disease that likely represents a combination of aspiration/pneumonia and pulmonary edema  //Fluid overload  -COVID was ruled out  //History of uncontrolled diabetes last A1c was 11.3    // Hypertension better controlled,       Hospital Problems         Last Modified POA    Pulmonary edema 8/28/2021 Yes    Chronic kidney disease (CKD), stage III (moderate) (HCC) 8/28/2021 Yes    Chronic diastolic (congestive) heart failure ([address] 8/28/2021 Yes    Panic disorder 9/10/2021 Yes    Acute on chronic diastolic heart failure ([address] 8/28/2021 Yes    Acute respiratory failure ([address] 8/27/2021 Yes    Depression 9/10/2021 Yes           Thank you for allowing me to participate in this patient's care. Please do not hesitate to contact me anytime. We will follow along with you. Neyda Guzman MD,  [facility] [address] [place] [address] [place]: [phone] or [address]  Fax: [phone]            ========================================================   ========================================================   Subjective:   No complaints today        Past medical, Surgical, Social, Family medical history reviewed by me. MEDICATIONS: reviewed by me. Medications Prior to Admission:  No current facility-administered medications on file prior to encounter.      Current Outpatient Medications on File Prior to Encounter   Medication Sig Dispense Refill    Dulaglutide 0.75 MG/0.5ML SOPN Inject 0.75 mg into the skin once a week 4 pen 1    ibuprofen (ADVIL;MOTRIN) 200 MG CAPS Take 1 capsule by mouth      furosemide (LASIX) 80 MG tablet Take 80 mg by mouth every morning 80mg in the morning 40mg in the evening      amLODIPine (NORVASC) 10 MG tablet Take 1 tablet by mouth daily 30 tablet 1    furosemide (LASIX) 40 MG tablet Take 1 tablet by mouth every evening 30 tablet 1    spironolactone (ALDACTONE) 50 MG tablet Take 1 tablet by mouth daily 30 tablet 2    insulin glargine (LANTUS;BASAGLAR) 100 UNIT/ML injection pen Inject 30 Units into the skin daily 4 pen 2    lisinopril (PRINIVIL;ZESTRIL) 40 MG tablet Take 1 tablet by mouth daily 30 tablet 2    atorvastatin (LIPITOR) 40 MG tablet Take 1 tablet by mouth nightly 30 tablet 3    Insulin Pen Needle 29G X 12.7MM MISC 1 each by Does not apply route daily 100 each 5    propranolol (INDERAL LA) 80 MG extended release capsule Take 1 capsule by mouth every morning 30 capsule 2    LORazepam (ATIVAN) 0.5 MG tablet Take 0.5 mg by mouth 2 times daily as needed for Anxiety.  aspirin 81 MG EC tablet Take 1 tablet by mouth daily 30 tablet 3    pregabalin (LYRICA) 75 MG capsule Take 1 capsule by mouth nightly for 7 days.  7 capsule 0    sertraline (ZOLOFT) 50 MG tablet Take 1 tablet by mouth daily 30 tablet 3    glucose monitoring kit (FREESTYLE) monitoring kit 1 kit by Does not apply route daily 1 kit 0    insulin lispro, 1 Unit Dial, 100 UNIT/ML SOPN Inject 0-6 Units into the skin 3 times daily (with meals) **Corrective Low Dose Algorithm**  Glucose: Dose:               No Insulin  140-199 1 Unit  200-249 2 Units  250-299 3 Units  300-349 4 Units  350-399 5 Units  Over 399 6 Units (Patient taking differently: Inject 6-12 Units into the skin 3 times daily (with meals) **Corrective Low Dose Algorithm**  Glucose: Dose:               No Insulin  140-199 1 Unit  200-249 2 Units  250-299 3 Units  300-349 4 Units  350-399 5 Units  Over 399 6 Units) 3 pen 0    glucose blood VI test strips (VICTORY atorvastatin (LIPITOR) tablet 40 mg, 40 mg, Oral, Nightly, Nancy Crowe MD, 40 mg at 09/11/21 2001    propranolol (INDERAL LA) extended release capsule 80 mg, 80 mg, Oral, QAM, Nancy Crowe MD, 80 mg at 09/12/21 0825    epoetin yohana (EPOGEN;PROCRIT) injection 10,000 Units, 10,000 Units, IntraVENous, Q MWF, Spike Valencia MD, 10,000 Units at 09/10/21 1356    hydrALAZINE (APRESOLINE) injection 20 mg, 20 mg, IntraVENous, Q4H PRN, Jeannie Velasquez MD, 20 mg at 09/08/21 2335    0.9 % sodium chloride infusion, , IntraVENous, PRN, Nu Barnhart MD    sodium chloride flush 0.9 % injection 5-40 mL, 5-40 mL, IntraVENous, Q12H, Jack Subramanian MD, 10 mL at 09/10/21 1108    sodium chloride flush 0.9 % injection 5-40 mL, 5-40 mL, IntraVENous, PRN, Nu Barnhart MD    fluticasone (FLONASE) 50 MCG/ACT nasal spray 1 spray, 1 spray, Each Nostril, PRN, David James MD    LORazepam (ATIVAN) injection 0.5 mg, 0.5 mg, IntraVENous, Q4H PRN, Chavo Castañeda MD, 0.5 mg at 09/09/21 0232    cloNIDine (CATAPRES) 0.2 MG/24HR 1 patch, 1 patch, TransDERmal, Weekly, Jeannie Velasquez MD, 1 patch at 09/10/21 1355    heparin (porcine) injection 5,000 Units, 5,000 Units, SubCUTAneous, 3 times per day, Norm Trejo MD, 5,000 Units at 09/12/21 0520    albumin human 25 % IV solution 25 g, 25 g, IntraVENous, PRN, Navid Brewer MD, Stopped at 09/01/21 1439    glucose (GLUTOSE) 40 % oral gel 15 g, 15 g, Oral, PRN, Brittany Dejesus, DO    dextrose 50 % IV solution, 12.5 g, IntraVENous, PRN, Reymundod IRA Dejesus, DO    glucagon (rDNA) injection 1 mg, 1 mg, IntraMUSCular, PRN, Brittany Dejesus DO    dextrose 5 % solution, 100 mL/hr, IntraVENous, PRN, Brittany Dejesus DO    fentaNYL (SUBLIMAZE) injection 50 mcg, 50 mcg, IntraVENous, Q1H PRN, Cely Blanchard MD    sodium chloride flush 0.9 % injection 5-40 mL, 5-40 mL, IntraVENous, 2 times per day, Brittany Dejesus DO, 10 mL at 09/09/21 2200    sodium chloride flush 0.9 % injection 5-40 mL, 5-40 mL, IntraVENous, PRN, Brittany Dejesus, DO, 10 mL at 09/05/21 1657    0.9 % sodium chloride infusion, 25 mL, IntraVENous, PRN, Reymundod IRA Archerzi, DO, Stopped at 08/29/21 2301    ondansetron (ZOFRAN-ODT) disintegrating tablet 4 mg, 4 mg, Oral, Q8H PRN **OR** ondansetron (ZOFRAN) injection 4 mg, 4 mg, IntraVENous, Q6H PRN, Quenitnmad A Oleg, DO    polyethylene glycol (GLYCOLAX) packet 17 g, 17 g, Oral, Daily PRN, Manpreet Dejesus, DO    acetaminophen (TYLENOL) tablet 650 mg, 650 mg, Oral, Q6H PRN **OR** acetaminophen (TYLENOL) suppository 650 mg, 650 mg, Rectal, Q6H PRN, Brittany Dejesus, DO, 650 mg at 09/02/21 1648      REVIEW OF SYSTEMS:     Review of systems not obtained due to patient factors - intubation       PHYSICAL EXAM:  Recent vital signs and recent I/Os reviewed by me. Wt Readings from Last 3 Encounters:   09/12/21 165 lb 9.1 oz (75.1 kg)   07/08/21 244 lb 11.2 oz (111 kg)   02/26/21 237 lb 3.2 oz (107.6 kg)     BP Readings from Last 3 Encounters:   09/12/21 (!) 154/64   07/08/21 (!) 160/100   02/26/21 133/86     Patient Vitals for the past 24 hrs:   BP Temp Temp src Pulse Resp SpO2 Weight   09/12/21 0751 (!) 154/64 98.1 °F (36.7 °C) Temporal 86 16 91 % --   09/12/21 0531 -- -- -- -- -- -- 165 lb 9.1 oz (75.1 kg)   09/12/21 0520 (!) 138/54 98.1 °F (36.7 °C) Temporal 86 16 96 % --   09/11/21 2352 -- -- -- 87 -- -- --   09/11/21 2345 (!) 164/65 97.9 °F (36.6 °C) Temporal -- 18 97 % --   09/11/21 2000 138/61 97.8 °F (36.6 °C) Temporal 89 18 98 % --   09/11/21 1647 -- 97 °F (36.1 °C) Temporal 91 18 -- --   09/11/21 1206 131/68 98 °F (36.7 °C) Temporal 91 20 -- --     No intake or output data in the 24 hours ending 09/12/21 1115       Constitutional: Poorly responsive, Intubated and  obese habitus  Eyes: Conjunctiva clear and No scleral icterus  Ear, Nose, and Throat: moist oral mucosa;   Neck: Trachea midline,  No jugular venous distension  Cardiovascular: Regular rate and ryhthm, normal S1 and S2,    Respiratory: clear  Abdomen:  distended. Normal bowel sounds. : Meza catheter is inserted, draining urine  Skin: no rash,  bruises on visible body area  Musculoskeletal: No clubbing or cyanosis of digits. and Normocephalic. Extremitties: no peripheral edema, no deformities. DATA:  Diagnostic tests reviewed by me for today's visit:   (AS NEEDED FOR MY EVALUATION AND MANAGEMENT). Recent Labs     09/10/21  0524 09/11/21  0341 09/12/21  0538   WBC 17.9* 18.3* 14.8*   HCT 29.7* 30.3* 29.4*    341 355     Iron Saturation:  No components found for: PERCENTFE  FERRITIN:    Lab Results   Component Value Date    FERRITIN 112.0 08/28/2021     IRON:    Lab Results   Component Value Date    IRON 22 08/28/2021     TIBC:    Lab Results   Component Value Date    TIBC 184 08/28/2021       Recent Labs     09/10/21  0524 09/11/21 0341 09/12/21  0538   * 131* 134*   K 3.9 4.2 4.3    103 104   CO2 17* 16* 18*   BUN 34* 26* 33*   CREATININE 4.8* 4.2* 5.5*     Recent Labs     09/10/21  0524 09/11/21  0341 09/12/21  0538   CALCIUM 8.7 8.4 8.7   MG 2.00 1.90 1.90   PHOS 3.8 2.3* 3.7     No results for input(s): PH, PCO2, PO2 in the last 72 hours.     Invalid input(s): [place] [employer]    ABG:  No results found for: PH, PCO2, PO2, HCO3, BE, THGB, TCO2, O2SAT  VBG:    Lab Results   Component Value Date    PHVEN 7.264 09/12/2021    OAE9ETC 34.3 02/23/2021    BEVEN -4.6 02/23/2021    F3SQBWLP 100 02/23/2021       LDH:  No results found for: LDH  Uric Acid:  No results found for: LABURIC, URICACID    PT/INR:    Lab Results   Component Value Date    PROTIME 11.3 09/07/2021    INR 1.00 09/07/2021     Warfarin PT/INR:  No components found for: PTPATWAR, PTINRWAR  PTT:    Lab Results   Component Value Date    APTT 29.1 09/12/2021   [APTT}  Last 3 Troponin:    Lab Results   Component Value Date    TROPONINI 0.23 08/28/2021    TROPONINI 0.22 08/27/2021    TROPONINI 0.24 08/27/2021 U/A:    Lab Results   Component Value Date    COLORU YELLOW 08/27/2021    PROTEINU >300 08/27/2021    PHUR 6.0 08/28/2021    WBCUA 7 08/27/2021    RBCUA 3 08/27/2021    CLARITYU Clear 08/27/2021    SPECGRAV 1.013 08/27/2021    LEUKOCYTESUR Negative 08/27/2021    UROBILINOGEN 0.2 08/27/2021    BILIRUBINUR Negative 08/27/2021    BLOODU SMALL 08/27/2021    GLUCOSEU 250 08/27/2021     Microalbumen/Creatinine ratio:  No components found for: RUCREAT  24 Hour Urine for Protein:  No components found for: RAWUPRO, UHRS3, WOMC29ZS, UTV3  24 Hour Urine for Creatinine Clearance:  No components found for: CREAT4, UHRS10, UTV10  Urine Toxicology:  No components found for: Carolyn Carmen, IBENZO, ICOCAINE, IMARTHC, IOPIATES, IPHENCYC    HgBA1c:    Lab Results   Component Value Date    LABA1C 5.7 08/27/2021     RPR:  No results found for: RPR  HIV:  No results found for: HIV  NILSA:    Lab Results   Component Value Date    NILSA Negative 04/25/2020     RF:  No results found for: RF  DSDNA:  No components found for: DNA  AMYLASE:  No results found for: AMYLASE  LIPASE:    Lab Results   Component Value Date    LIPASE 14.0 04/25/2020     Fibrinogen Level:  No components found for: FIB       BELOW MENTIONED RADIOLOGY STUDY RESULTS BY ME (AS NEEDED FOR MY EVALUATION AND MANAGEMENT). CT HEAD WO CONTRAST    Result Date: 8/27/2021  EXAMINATION: CT OF THE HEAD WITHOUT CONTRAST; CT OF THE CHEST WITHOUT CONTRAST  8/27/2021 7:12 pm TECHNIQUE: CT of the head was performed without the administration of intravenous contrast. Dose modulation, iterative reconstruction, and/or weight based adjustment of the mA/kV was utilized to reduce the radiation dose to as low as reasonably achievable.; CT of the chest was performed without the administration of intravenous contrast. Multiplanar reformatted images are provided for review.  Dose modulation, iterative reconstruction, and/or weight based adjustment of the mA/kV was utilized to reduce the radiation dose to as low as reasonably achievable. COMPARISON: CT head 08/27/2020. HISTORY: ORDERING SYSTEM PROVIDED HISTORY: AMS TECHNOLOGIST PROVIDED HISTORY: Has a \"code stroke\" or \"stroke alert\" been called? ->No Reason for exam:->AMS Decision Support Exception - unselect if not a suspected or confirmed emergency medical condition->Emergency Medical Condition (MA) Is the patient pregnant?->No Reason for Exam: Respiratory Distress (Pt brought in per [facility] after call from  for resp distress. O2 sat 70's, BMV in route. Pt will open eyes to voice. Pupils 8mm and fixed. ) Acuity: Acute Type of Exam: Initial; ORDERING SYSTEM PROVIDED HISTORY: AMS, respiratory distress TECHNOLOGIST PROVIDED HISTORY: Reason for exam:->AMS, respiratory distress Is the patient pregnant?->No Reason for Exam: Respiratory Distress (Pt brought in per [facility] after call from  for resp distress. O2 sat 70's, BMV in route. Pt will open eyes to voice. Pupils 8mm and fixed. ) Acuity: Acute Type of Exam: Initial CT HEAD FINDINGS: BRAIN/VENTRICLES: No acute hemorrhage. Periventricular and subcortical hypoattenuation is nonspecific. Interval chronic lacunar infarcts in the left corona radiata, thalamus, and internal capsule. Artifact partially obscures the laureano. Artifact partially obscures the inferior cerebellum. Estella Freud white differentiation appears maintained given artifact near the skull base and through the posterior fossa. Mild prominence of the ventricles noted. Overall ventricle size appears increased from prior exam.  There is no midline shift. Basal cisterns appear patent. ORBITS: Visualized orbits appear unremarkable on this unenhanced exam. SINUSES: Mild mucosal thickening of the ethmoid and sphenoid sinuses. Visualized mastoid air cells appear clear. SOFT TISSUES/SKULL: No depressed calvarial fracture. Fluid in the nasopharynx and oropharynx. CT HEAD IMPRESSION: No acute hemorrhage or midline shift. Increased ventricle size compared to prior exam.  Correlate with presentation to exclude acute hydrocephalus. Other findings as described. CT CHEST FINDINGS: Mediastinum: Heart is borderline enlarged. Trace pericardial effusion or thickening. Aorta is within normal limits in size. Pulmonary arteries are within normal limits in size. . Lungs/pleura: Central airways are patent. Endotracheal tube with tip terminating in the distal 3rd subglottic trachea. Secretions noted in the trachea. Opacification of segmental and subsegmental bronchi. Ground-glass the confluent airspace disease. Interlobular septal thickening. Small to moderate pleural effusions. No pneumothorax. Soft Tissues/Bones: Suboptimal evaluation for adenopathy without intravenous contrast. Mediastinal adenopathy. Diffuse body wall edema. Scattered degenerative changes noted in the visualized spine without spondylolisthesis. Upper Abdomen: Limited images of the upper abdomen are unremarkable given lack of intravenous contrast. CT CHEST IMPRESSION: 1.   1. Multifocal airspace disease that likely represents a combination of aspiration/pneumonia and pulmonary edema. 2. Other findings as described. CT CHEST WO CONTRAST    Result Date: 8/27/2021  EXAMINATION: CT OF THE HEAD WITHOUT CONTRAST; CT OF THE CHEST WITHOUT CONTRAST  8/27/2021 7:12 pm TECHNIQUE: CT of the head was performed without the administration of intravenous contrast. Dose modulation, iterative reconstruction, and/or weight based adjustment of the mA/kV was utilized to reduce the radiation dose to as low as reasonably achievable.; CT of the chest was performed without the administration of intravenous contrast. Multiplanar reformatted images are provided for review. Dose modulation, iterative reconstruction, and/or weight based adjustment of the mA/kV was utilized to reduce the radiation dose to as low as reasonably achievable. COMPARISON: CT head 08/27/2020.  HISTORY: ORDERING SYSTEM PROVIDED HISTORY: AMS TECHNOLOGIST PROVIDED HISTORY: Has a \"code stroke\" or \"stroke alert\" been called? ->No Reason for exam:->AMS Decision Support Exception - unselect if not a suspected or confirmed emergency medical condition->Emergency Medical Condition (MA) Is the patient pregnant?->No Reason for Exam: Respiratory Distress (Pt brought in per [facility] after call from  for resp distress. O2 sat 70's, BMV in route. Pt will open eyes to voice. Pupils 8mm and fixed. ) Acuity: Acute Type of Exam: Initial; ORDERING SYSTEM PROVIDED HISTORY: AMS, respiratory distress TECHNOLOGIST PROVIDED HISTORY: Reason for exam:->AMS, respiratory distress Is the patient pregnant?->No Reason for Exam: Respiratory Distress (Pt brought in per [facility] after call from  for resp distress. O2 sat 70's, BMV in route. Pt will open eyes to voice. Pupils 8mm and fixed. ) Acuity: Acute Type of Exam: Initial CT HEAD FINDINGS: BRAIN/VENTRICLES: No acute hemorrhage. Periventricular and subcortical hypoattenuation is nonspecific. Interval chronic lacunar infarcts in the left corona radiata, thalamus, and internal capsule. Artifact partially obscures the laureano. Artifact partially obscures the inferior cerebellum. [place] [employer] white differentiation appears maintained given artifact near the skull base and through the posterior fossa. Mild prominence of the ventricles noted. Overall ventricle size appears increased from prior exam.  There is no midline shift. Basal cisterns appear patent. ORBITS: Visualized orbits appear unremarkable on this unenhanced exam. SINUSES: Mild mucosal thickening of the ethmoid and sphenoid sinuses. Visualized mastoid air cells appear clear. SOFT TISSUES/SKULL: No depressed calvarial fracture. Fluid in the nasopharynx and oropharynx. CT HEAD IMPRESSION: No acute hemorrhage or midline shift. Increased ventricle size compared to prior exam.  Correlate with presentation to exclude acute hydrocephalus. airspace disease, atelectasis or pleural effusion. There is right basilar airspace disease. There is no appreciable pneumothorax. BONES/SOFT TISSUE: No acute abnormality. Endotracheal tube and enteric tube are in satisfactory position. The left IJ central venous catheter tip projects over the area of the left brachiocephalic vein. Right basilar airspace disease. Left basilar opacification reflecting airspace disease, atelectasis or pleural effusion. Conclusions      Summary   *Left ventricle - moderate concentric LVH, normal size and function with EF   of 55%   *Mitral valve - mild regurgitation   *Tricuspid valve - trivial regurgitation   *Bubble study - negative      Signature      ------------------------------------------------------------------   Electronically signed by Belia Crockett MD (Interpreting   physician) on 08/31/2020 at 02:48 PM   ------------------------------------------------------------------         Summary   Left ventricular systolic function is normal with ejection fraction   estimated at 55-60 %. No regional wall motion abnormalities are noted. There is mild left ventricular hypertrophy. Normal left ventricular diastolic filling pressure. Moderate mitral regurgitation. Mild pulmonic regurgitation present. IVC size is dilated (>2.1 cm) but collapses > 50% with respiration   consistent with elevated RA pressure (8 mmHg). **There is a small-moderate bilateral pleural effusion. **There is a small circumferential pericardial effusion noted.       Previous echo done 2020 - EF 55%      Signature      ------------------------------------------------------------------   Electronically signed by Fletcher Peters Asa, MD   (Interpreting physician) on 08/30/2021 at 04:21 PM   ------------------------------------------------------------------

## 2021-09-13 VITALS
SYSTOLIC BLOOD PRESSURE: 146 MMHG | TEMPERATURE: 98 F | HEIGHT: 66 IN | HEART RATE: 93 BPM | WEIGHT: 176.81 LBS | BODY MASS INDEX: 28.42 KG/M2 | DIASTOLIC BLOOD PRESSURE: 67 MMHG | RESPIRATION RATE: 18 BRPM | OXYGEN SATURATION: 95 %

## 2021-09-13 LAB
A/G RATIO: 0.8 (ref 1.1–2.2)
ALBUMIN SERPL-MCNC: 2.5 G/DL (ref 3.4–5)
ALP BLD-CCNC: 102 U/L (ref 40–129)
ALT SERPL-CCNC: <5 U/L (ref 10–40)
ANION GAP SERPL CALCULATED.3IONS-SCNC: 14 MMOL/L (ref 3–16)
ANISOCYTOSIS: ABNORMAL
APTT: 32 SEC (ref 26.2–38.6)
AST SERPL-CCNC: 10 U/L (ref 15–37)
BANDED NEUTROPHILS RELATIVE PERCENT: 2 % (ref 0–7)
BASOPHILS ABSOLUTE: 0.1 K/UL (ref 0–0.2)
BASOPHILS ABSOLUTE: 0.1 K/UL (ref 0–0.2)
BASOPHILS RELATIVE PERCENT: 0.8 %
BASOPHILS RELATIVE PERCENT: 1 %
BILIRUB SERPL-MCNC: 0.3 MG/DL (ref 0–1)
BUN BLDV-MCNC: 41 MG/DL (ref 7–20)
BURR CELLS: ABNORMAL
CALCIUM IONIZED: 1.15 MMOL/L (ref 1.12–1.32)
CALCIUM SERPL-MCNC: 8.6 MG/DL (ref 8.3–10.6)
CHLORIDE BLD-SCNC: 106 MMOL/L (ref 99–110)
CO2: 15 MMOL/L (ref 21–32)
CREAT SERPL-MCNC: 6.3 MG/DL (ref 0.6–1.1)
EOSINOPHILS ABSOLUTE: 0.3 K/UL (ref 0–0.6)
EOSINOPHILS ABSOLUTE: 0.4 K/UL (ref 0–0.6)
EOSINOPHILS RELATIVE PERCENT: 2 %
EOSINOPHILS RELATIVE PERCENT: 2.6 %
GFR AFRICAN AMERICAN: 9
GFR NON-AFRICAN AMERICAN: 7
GLOBULIN: 3.3 G/DL
GLUCOSE BLD-MCNC: 151 MG/DL (ref 70–99)
GLUCOSE BLD-MCNC: 168 MG/DL (ref 70–99)
HCT VFR BLD CALC: 27 % (ref 36–48)
HCT VFR BLD CALC: 29.4 % (ref 36–48)
HEMATOLOGY PATH CONSULT: NO
HEMATOLOGY PATH CONSULT: NO
HEMOGLOBIN: 8.7 G/DL (ref 12–16)
HEMOGLOBIN: 9.5 G/DL (ref 12–16)
LACTIC ACID: 0.5 MMOL/L (ref 0.4–2)
LYMPHOCYTES ABSOLUTE: 2.8 K/UL (ref 1–5.1)
LYMPHOCYTES ABSOLUTE: 3.3 K/UL (ref 1–5.1)
LYMPHOCYTES RELATIVE PERCENT: 17.9 %
LYMPHOCYTES RELATIVE PERCENT: 22 %
MAGNESIUM: 1.8 MG/DL (ref 1.8–2.4)
MCH RBC QN AUTO: 28.5 PG (ref 26–34)
MCH RBC QN AUTO: 28.7 PG (ref 26–34)
MCHC RBC AUTO-ENTMCNC: 32.1 G/DL (ref 31–36)
MCHC RBC AUTO-ENTMCNC: 32.3 G/DL (ref 31–36)
MCV RBC AUTO: 88.6 FL (ref 80–100)
MCV RBC AUTO: 88.7 FL (ref 80–100)
MONOCYTES ABSOLUTE: 1.6 K/UL (ref 0–1.3)
MONOCYTES ABSOLUTE: 1.6 K/UL (ref 0–1.3)
MONOCYTES RELATIVE PERCENT: 10.3 %
MONOCYTES RELATIVE PERCENT: 11 %
NEUTROPHILS ABSOLUTE: 10.6 K/UL (ref 1.7–7.7)
NEUTROPHILS ABSOLUTE: 9.5 K/UL (ref 1.7–7.7)
NEUTROPHILS RELATIVE PERCENT: 62 %
NEUTROPHILS RELATIVE PERCENT: 68.4 %
PDW BLD-RTO: 16.1 % (ref 12.4–15.4)
PDW BLD-RTO: 16.1 % (ref 12.4–15.4)
PERFORMED ON: ABNORMAL
PH VENOUS: 7.32 (ref 7.35–7.45)
PHOSPHORUS: 4.7 MG/DL (ref 2.5–4.9)
PLATELET # BLD: 355 K/UL (ref 135–450)
PLATELET # BLD: 392 K/UL (ref 135–450)
PLATELET SLIDE REVIEW: ADEQUATE
PMV BLD AUTO: 8.2 FL (ref 5–10.5)
PMV BLD AUTO: 8.8 FL (ref 5–10.5)
POLYCHROMASIA: ABNORMAL
POTASSIUM SERPL-SCNC: 3.9 MMOL/L (ref 3.5–5.1)
RBC # BLD: 3.05 M/UL (ref 4–5.2)
RBC # BLD: 3.31 M/UL (ref 4–5.2)
SLIDE REVIEW: ABNORMAL
SODIUM BLD-SCNC: 135 MMOL/L (ref 136–145)
TOTAL CK: 25 U/L (ref 26–192)
TOTAL PROTEIN: 5.8 G/DL (ref 6.4–8.2)
WBC # BLD: 14.8 K/UL (ref 4–11)
WBC # BLD: 15.5 K/UL (ref 4–11)

## 2021-09-13 PROCEDURE — 97530 THERAPEUTIC ACTIVITIES: CPT

## 2021-09-13 PROCEDURE — 6370000000 HC RX 637 (ALT 250 FOR IP): Performed by: PSYCHIATRY & NEUROLOGY

## 2021-09-13 PROCEDURE — 6360000002 HC RX W HCPCS: Performed by: INTERNAL MEDICINE

## 2021-09-13 PROCEDURE — 82550 ASSAY OF CK (CPK): CPT

## 2021-09-13 PROCEDURE — 6370000000 HC RX 637 (ALT 250 FOR IP): Performed by: INTERNAL MEDICINE

## 2021-09-13 PROCEDURE — 97116 GAIT TRAINING THERAPY: CPT

## 2021-09-13 PROCEDURE — 82330 ASSAY OF CALCIUM: CPT

## 2021-09-13 PROCEDURE — 80053 COMPREHEN METABOLIC PANEL: CPT

## 2021-09-13 PROCEDURE — 85025 COMPLETE CBC W/AUTO DIFF WBC: CPT

## 2021-09-13 PROCEDURE — 92507 TX SP LANG VOICE COMM INDIV: CPT

## 2021-09-13 PROCEDURE — 2580000003 HC RX 258: Performed by: INTERNAL MEDICINE

## 2021-09-13 PROCEDURE — 85730 THROMBOPLASTIN TIME PARTIAL: CPT

## 2021-09-13 PROCEDURE — 97129 THER IVNTJ 1ST 15 MIN: CPT

## 2021-09-13 PROCEDURE — 97535 SELF CARE MNGMENT TRAINING: CPT

## 2021-09-13 PROCEDURE — 6370000000 HC RX 637 (ALT 250 FOR IP): Performed by: NURSE PRACTITIONER

## 2021-09-13 PROCEDURE — 83735 ASSAY OF MAGNESIUM: CPT

## 2021-09-13 PROCEDURE — 83605 ASSAY OF LACTIC ACID: CPT

## 2021-09-13 PROCEDURE — 84100 ASSAY OF PHOSPHORUS: CPT

## 2021-09-13 PROCEDURE — 92526 ORAL FUNCTION THERAPY: CPT

## 2021-09-13 RX ORDER — NICOTINE POLACRILEX 4 MG
15 LOZENGE BUCCAL PRN
Qty: 45 G | Refills: 1 | Status: SHIPPED | OUTPATIENT
Start: 2021-09-13 | End: 2022-05-26

## 2021-09-13 RX ORDER — FLUOXETINE HYDROCHLORIDE 20 MG/1
20 CAPSULE ORAL DAILY
Qty: 30 CAPSULE | Refills: 3 | Status: SHIPPED
Start: 2021-09-13 | End: 2021-11-01 | Stop reason: ALTCHOICE

## 2021-09-13 RX ORDER — FUROSEMIDE 40 MG/1
40 TABLET ORAL DAILY
Qty: 60 TABLET | Refills: 3 | Status: SHIPPED
Start: 2021-09-13 | End: 2021-10-04 | Stop reason: DRUGHIGH

## 2021-09-13 RX ORDER — CLONIDINE 0.2 MG/24H
1 PATCH, EXTENDED RELEASE TRANSDERMAL WEEKLY
Qty: 4 PATCH | Refills: 2 | Status: SHIPPED | OUTPATIENT
Start: 2021-09-17 | End: 2021-11-01

## 2021-09-13 RX ADMIN — PROPRANOLOL HYDROCHLORIDE 80 MG: 80 CAPSULE, EXTENDED RELEASE ORAL at 09:19

## 2021-09-13 RX ADMIN — FUROSEMIDE 40 MG: 40 TABLET ORAL at 09:13

## 2021-09-13 RX ADMIN — PANTOPRAZOLE SODIUM 40 MG: 40 TABLET, DELAYED RELEASE ORAL at 09:13

## 2021-09-13 RX ADMIN — ASPIRIN 81 MG: 81 TABLET, COATED ORAL at 09:13

## 2021-09-13 RX ADMIN — FLUOXETINE 20 MG: 20 CAPSULE ORAL at 09:13

## 2021-09-13 RX ADMIN — Medication 10 ML: at 09:16

## 2021-09-13 RX ADMIN — HEPARIN SODIUM 5000 UNITS: 5000 INJECTION INTRAVENOUS; SUBCUTANEOUS at 06:06

## 2021-09-13 RX ADMIN — INSULIN LISPRO 1 UNITS: 100 INJECTION, SOLUTION INTRAVENOUS; SUBCUTANEOUS at 09:14

## 2021-09-13 RX ADMIN — AMLODIPINE BESYLATE 10 MG: 5 TABLET ORAL at 09:13

## 2021-09-13 RX ADMIN — MICONAZOLE NITRATE: 20 POWDER TOPICAL at 09:16

## 2021-09-13 ASSESSMENT — PAIN SCALES - GENERAL: PAINLEVEL_OUTOF10: 0

## 2021-09-13 NOTE — PROGRESS NOTES
MD Jaiden Lowe MD Baron Cast, MD               Office: [phone]                      Fax: [phone] [facility]                   NEPHROLOGY CVU INPATIENT PROGRESS NOTE:     PATIENT NAME: Toribio Page  : 1975  MRN: [id number]     Nephrology treatment plan update. Cardiac cath done, non-obstructive CAD. EF-60%  TDC placed   Outpt placement at [facility] 9. Next HD today  - switched to po Lasix, decreased to 40mg daily. Now appearing euvolemic.    - RIA IV with HD MWF    -Patient has advanced background of chronic kidney disease stage IV from diabetic nephropathy.  -Follows with me.   -Likely progression of kidney disease. Medications reviewed. Hold Ace inhibitors    Anemia-hgb better. RIA with HD. Poor nutritional status.  -Albumin is 2.5. Discussed with RN. RECOMMENDATIONS:   Next HD today  Outpt HD placement MWF at [facility] to po Lasix. Okay for discharge from renal perspective. Awaiting pre-cert. IMPRESSION:       Admitted for:  Acute respiratory failure ([address] [J96.00]  Encephalopathy [G93.40]  Respiratory failure with hypoxia, unspecified chronicity ([address] [J96.91]  Pneumonia due to infectious organism, unspecified laterality, unspecified part of lung [J18.9]      KINZA (on proteinuric CKD: 4): Worsening. Started on HD. Possible ESRD.   - BL Scr-last known 2.5, in 2021,   ---> 4.6 on admission  -: Etiology of KINZA -presumed ATN in the setting of pneumonia, unclear if it this is advancing CKD    History of nephrotic range proteinuria,   - thought to be from diabetic nephropathy With CKD stage III -> so high risk of advanced CKD,  -Follows with me  -Noncompliance, last follow-up was 2021 then lost for follow-up  -echo results -  IVC size is dilated (>2.1 cm) but collapses > 50% with respiration consistent with elevated RA pressure (8 mmHg).    -Last echo-8/2020  moderate concentric LVH, normal size and function with EF of 26%, normal diastolic function      Associated problems:   Hypokalemia-needing repletion. Better. Acidosis, non-anion gap-HD today  Hypomagnesemia-better  Hypophosphatemia-better  Anemia, chronic disease-RIA with HD      Other major problems: Management per primary and other consulting teams.   //Acute hypoxic respiratory failure -needing intubation. Extubated  // Multifocal airspace disease that likely represents a combination of aspiration/pneumonia and pulmonary edema  //Fluid overload  -COVID was ruled out  //History of uncontrolled diabetes last A1c was 11.3    // Hypertension better controlled,       Hospital Problems         Last Modified POA    Pulmonary edema 8/28/2021 Yes    Chronic kidney disease (CKD), stage III (moderate) (HCC) 8/28/2021 Yes    Chronic diastolic (congestive) heart failure ([address] 8/28/2021 Yes    Panic disorder 9/10/2021 Yes    Acute on chronic diastolic heart failure ([address] 8/28/2021 Yes    Acute respiratory failure ([address] 8/27/2021 Yes    Depression 9/10/2021 Yes           Thank you for allowing me to participate in this patient's care. Please do not hesitate to contact me anytime. We will follow along with you. Daryle Ewing, MD,  [facility] [address] [place] [address] [place]: [phone] or [employer]  Fax: [phone]            ========================================================   ========================================================   Subjective:   No complaints today        Past medical, Surgical, Social, Family medical history reviewed by me. MEDICATIONS: reviewed by me. Medications Prior to Admission:  No current facility-administered medications on file prior to encounter.      Current Outpatient Medications on File Prior to Encounter   Medication Sig Dispense Refill    Dulaglutide 0.75 MG/0.5ML SOPN Inject 0.75 mg into the skin once a week 4 pen 1    amLODIPine (NORVASC) 10 MG tablet Take 1 tablet by mouth daily 30 tablet 1    spironolactone (ALDACTONE) 50 MG tablet Take 1 tablet by mouth daily 30 tablet 2    lisinopril (PRINIVIL;ZESTRIL) 40 MG tablet Take 1 tablet by mouth daily 30 tablet 2    atorvastatin (LIPITOR) 40 MG tablet Take 1 tablet by mouth nightly 30 tablet 3    Insulin Pen Needle 29G X 12.7MM MISC 1 each by Does not apply route daily 100 each 5    propranolol (INDERAL LA) 80 MG extended release capsule Take 1 capsule by mouth every morning 30 capsule 2    LORazepam (ATIVAN) 0.5 MG tablet Take 0.5 mg by mouth 2 times daily as needed for Anxiety.  aspirin 81 MG EC tablet Take 1 tablet by mouth daily 30 tablet 3    pregabalin (LYRICA) 75 MG capsule Take 1 capsule by mouth nightly for 7 days. 7 capsule 0    glucose monitoring kit (FREESTYLE) monitoring kit 1 kit by Does not apply route daily 1 kit 0    insulin lispro, 1 Unit Dial, 100 UNIT/ML SOPN Inject 0-6 Units into the skin 3 times daily (with meals) **Corrective Low Dose Algorithm**  Glucose: Dose:               No Insulin  140-199 1 Unit  200-249 2 Units  250-299 3 Units  300-349 4 Units  350-399 5 Units  Over 399 6 Units (Patient taking differently: Inject 6-12 Units into the skin 3 times daily (with meals) **Corrective Low Dose Algorithm**  Glucose: Dose:               No Insulin  140-199 1 Unit  200-249 2 Units  250-299 3 Units  300-349 4 Units  350-399 5 Units  Over 399 6 Units) 3 pen 0    glucose blood VI test strips (VICTORY AGM-4000 TEST) strip Test four times daily until controlled and then three times daily.   Code 250.02  ONE TOUCH ULTRA MONITOR 100 strip 12         Current Facility-Administered Medications:     FLUoxetine (PROZAC) capsule 20 mg, 20 mg, Oral, Daily, Juan Antonio Dykes MD, 20 mg at 09/13/21 0913    miconazole (MICOTIN) 2 % powder, , Topical, BID, Annie Novoa MD, Given at 09/13/21 0916    aspirin EC tablet 81 mg, 81 mg, Oral, Daily, [facility] Tabitha Winters, APRN - CNP, 81 mg at 09/13/21 0913    insulin lispro (1 Unit Dial) 0-6 Units, 0-6 Units, SubCUTAneous, TID WC, Hayde Felix MD, 1 Units at 09/13/21 0914    insulin lispro (1 Unit Dial) 0-3 Units, 0-3 Units, SubCUTAneous, Nightly, Hayde Felix MD, 1 Units at 09/12/21 2151    pantoprazole (PROTONIX) tablet 40 mg, 40 mg, Oral, BID, Nancy Crowe MD, 40 mg at 09/13/21 0913    haloperidol lactate (HALDOL) injection 5 mg, 5 mg, IntraMUSCular, Q12H PRN, Nancy Crowe MD    heparin (porcine) injection 3,600 Units, 3,600 Units, IntraCATHeter, PRN, Spike Valencia MD, 3,600 Units at 09/08/21 1000    furosemide (LASIX) tablet 40 mg, 40 mg, Oral, Daily, Dillon Crowe MD, 40 mg at 09/13/21 0913    sodium chloride flush 0.9 % injection 5-40 mL, 5-40 mL, IntraVENous, 2 times per day, Anaid Gupta MD, 10 mL at 09/13/21 0916    sodium chloride flush 0.9 % injection 5-40 mL, 5-40 mL, IntraVENous, PRN, Anaid Gupta MD, 10 mL at 09/09/21 0825    0.9 % sodium chloride infusion, 25 mL, IntraVENous, PRN, Anaid Gupta MD    acetaminophen (TYLENOL) tablet 650 mg, 650 mg, Oral, Q4H PRN, Anaid Gupta MD    oxyCODONE-acetaminophen (PERCOCET) 5-325 MG per tablet 1 tablet, 1 tablet, Oral, Q4H PRN, 1 tablet at 09/09/21 0231 **OR** oxyCODONE-acetaminophen (PERCOCET) 5-325 MG per tablet 2 tablet, 2 tablet, Oral, Q4H PRN, Anaid Gupta MD    amLODIPine (NORVASC) tablet 10 mg, 10 mg, Oral, Daily, Nancy Crowe MD, 10 mg at 09/13/21 0913    atorvastatin (LIPITOR) tablet 40 mg, 40 mg, Oral, Nightly, Nancy Crowe MD, 40 mg at 09/12/21 2150    propranolol (INDERAL LA) extended release capsule 80 mg, 80 mg, Oral, QAM, Nancy Crowe MD, 80 mg at 09/13/21 0919    epoetin yohana (EPOGEN;PROCRIT) injection 10,000 Units, 10,000 Units, IntraVENous, Q MWF, Spike Valencia MD, 10,000 Units at 09/10/21 1356    hydrALAZINE (APRESOLINE) injection 20 mg, 20 mg, IntraVENous, Q4H PRN, MANAGEMENT). Recent Labs     09/11/21  0341 09/12/21  0538 09/13/21  0550   WBC 18.3* 14.8* 15.5*   HCT 30.3* 29.4* 27.0*    355 392     Iron Saturation:  No components found for: PERCENTFE  FERRITIN:    Lab Results   Component Value Date    FERRITIN 112.0 08/28/2021     IRON:    Lab Results   Component Value Date    IRON 22 08/28/2021     TIBC:    Lab Results   Component Value Date    TIBC 184 08/28/2021       Recent Labs     09/11/21 0341 09/12/21  0538 09/13/21  0550   * 134* 135*   K 4.2 4.3 3.9    104 106   CO2 16* 18* 15*   BUN 26* 33* 41*   CREATININE 4.2* 5.5* 6.3*     Recent Labs     09/11/21 0341 09/12/21  0538 09/13/21  0550   CALCIUM 8.4 8.7 8.6   MG 1.90 1.90 1.80   PHOS 2.3* 3.7 4.7     No results for input(s): PH, PCO2, PO2 in the last 72 hours.     Invalid input(s): [id number], INSPIREDO2    ABG:  No results found for: PH, PCO2, PO2, HCO3, BE, THGB, TCO2, O2SAT  VBG:    Lab Results   Component Value Date    PHVEN 7.322 09/13/2021    JVQ4WXT 34.3 02/23/2021    BEVEN -4.6 02/23/2021    C7OYTWBA 100 02/23/2021       LDH:  No results found for: LDH  Uric Acid:  No results found for: LABURIC, URICACID    PT/INR:    Lab Results   Component Value Date    PROTIME 11.3 09/07/2021    INR 1.00 09/07/2021     Warfarin PT/INR:  No components found for: Katherin Bosworth  PTT:    Lab Results   Component Value Date    APTT 32.0 09/13/2021   [APTT}  Last 3 Troponin:    Lab Results   Component Value Date    TROPONINI 0.23 08/28/2021    TROPONINI 0.22 08/27/2021    TROPONINI 0.24 08/27/2021       U/A:    Lab Results   Component Value Date    COLORU YELLOW 08/27/2021    PROTEINU >300 08/27/2021    PHUR 6.0 08/28/2021    WBCUA 7 08/27/2021    RBCUA 3 08/27/2021    CLARITYU Clear 08/27/2021    SPECGRAV 1.013 08/27/2021    LEUKOCYTESUR Negative 08/27/2021    UROBILINOGEN 0.2 08/27/2021    BILIRUBINUR Negative 08/27/2021    BLOODU SMALL 08/27/2021    GLUCOSEU 250 08/27/2021 Microalbumen/Creatinine ratio:  No components found for: RUCREAT  24 Hour Urine for Protein:  No components found for: RAWUPRO, UHRS3, UYHC79YM, UTV3  24 Hour Urine for Creatinine Clearance:  No components found for: CREAT4, UHRS10, UTV10  Urine Toxicology:  No components found for: [place], IBENZO, ICOCAINE, IMARTHC, IOPIATES, IPHENCYC    HgBA1c:    Lab Results   Component Value Date    LABA1C 5.7 08/27/2021     RPR:  No results found for: RPR  HIV:  No results found for: HIV  NILSA:    Lab Results   Component Value Date    NILSA Negative 04/25/2020     RF:  No results found for: RF  DSDNA:  No components found for: DNA  AMYLASE:  No results found for: AMYLASE  LIPASE:    Lab Results   Component Value Date    LIPASE 14.0 04/25/2020     Fibrinogen Level:  No components found for: FIB       BELOW MENTIONED RADIOLOGY STUDY RESULTS BY ME (AS NEEDED FOR MY EVALUATION AND MANAGEMENT). CT HEAD WO CONTRAST    Result Date: 8/27/2021  EXAMINATION: CT OF THE HEAD WITHOUT CONTRAST; CT OF THE CHEST WITHOUT CONTRAST  8/27/2021 7:12 pm TECHNIQUE: CT of the head was performed without the administration of intravenous contrast. Dose modulation, iterative reconstruction, and/or weight based adjustment of the mA/kV was utilized to reduce the radiation dose to as low as reasonably achievable.; CT of the chest was performed without the administration of intravenous contrast. Multiplanar reformatted images are provided for review. Dose modulation, iterative reconstruction, and/or weight based adjustment of the mA/kV was utilized to reduce the radiation dose to as low as reasonably achievable. COMPARISON: CT head 08/27/2020. HISTORY: ORDERING SYSTEM PROVIDED HISTORY: AMS TECHNOLOGIST PROVIDED HISTORY: Has a \"code stroke\" or \"stroke alert\" been called? ->No Reason for exam:->AMS Decision Support Exception - unselect if not a suspected or confirmed emergency medical condition->Emergency Medical Condition (MA) Is the patient pregnant?->No Reason for Exam: Respiratory Distress (Pt brought in per [facility] after call from  for resp distress. O2 sat 70's, BMV in route. Pt will open eyes to voice. Pupils 8mm and fixed. ) Acuity: Acute Type of Exam: Initial; ORDERING SYSTEM PROVIDED HISTORY: AMS, respiratory distress TECHNOLOGIST PROVIDED HISTORY: Reason for exam:->AMS, respiratory distress Is the patient pregnant?->No Reason for Exam: Respiratory Distress (Pt brought in [facility] after call from  for resp distress. O2 sat 70's, BMV in route. Pt will open eyes to voice. Pupils 8mm and fixed. ) Acuity: Acute Type of Exam: Initial CT HEAD FINDINGS: BRAIN/VENTRICLES: No acute hemorrhage. Periventricular and subcortical hypoattenuation is nonspecific. Interval chronic lacunar infarcts in the left corona radiata, thalamus, and internal capsule. Artifact partially obscures the laureano. Artifact partially obscures the inferior cerebellum. Lavella Reining white differentiation appears maintained given artifact near the skull base and through the posterior fossa. Mild prominence of the ventricles noted. Overall ventricle size appears increased from prior exam.  There is no midline shift. Basal cisterns appear patent. ORBITS: Visualized orbits appear unremarkable on this unenhanced exam. SINUSES: Mild mucosal thickening of the ethmoid and sphenoid sinuses. Visualized mastoid air cells appear clear. SOFT TISSUES/SKULL: No depressed calvarial fracture. Fluid in the nasopharynx and oropharynx. CT HEAD IMPRESSION: No acute hemorrhage or midline shift. Increased ventricle size compared to prior exam.  Correlate with presentation to exclude acute hydrocephalus. Other findings as described. CT CHEST FINDINGS: Mediastinum: Heart is borderline enlarged. Trace pericardial effusion or thickening. Aorta is within normal limits in size. Pulmonary arteries are within normal limits in size. . Lungs/pleura: Central airways are patent. Endotracheal tube with tip terminating in the distal 3rd subglottic trachea. Secretions noted in the trachea. Opacification of segmental and subsegmental bronchi. Ground-glass the confluent airspace disease. Interlobular septal thickening. Small to moderate pleural effusions. No pneumothorax. Soft Tissues/Bones: Suboptimal evaluation for adenopathy without intravenous contrast. Mediastinal adenopathy. Diffuse body wall edema. Scattered degenerative changes noted in the visualized spine without spondylolisthesis. Upper Abdomen: Limited images of the upper abdomen are unremarkable given lack of intravenous contrast. CT CHEST IMPRESSION: 1.   1. Multifocal airspace disease that likely represents a combination of aspiration/pneumonia and pulmonary edema. 2. Other findings as described. CT CHEST WO CONTRAST    Result Date: 8/27/2021  EXAMINATION: CT OF THE HEAD WITHOUT CONTRAST; CT OF THE CHEST WITHOUT CONTRAST  8/27/2021 7:12 pm TECHNIQUE: CT of the head was performed without the administration of intravenous contrast. Dose modulation, iterative reconstruction, and/or weight based adjustment of the mA/kV was utilized to reduce the radiation dose to as low as reasonably achievable.; CT of the chest was performed without the administration of intravenous contrast. Multiplanar reformatted images are provided for review. Dose modulation, iterative reconstruction, and/or weight based adjustment of the mA/kV was utilized to reduce the radiation dose to as low as reasonably achievable. COMPARISON: CT head 08/27/2020. HISTORY: ORDERING SYSTEM PROVIDED HISTORY: [facility] TECHNOLOGIST PROVIDED HISTORY: Has a \"code stroke\" or \"stroke alert\" been called? ->No Reason for exam:->[facility] Decision Support Exception - unselect if not a suspected or confirmed emergency medical condition->Emergency Medical Condition (MA) Is the patient pregnant?->No Reason for Exam: Respiratory Distress (Pt brought in per [facility] after call from  for resp distress. O2 sat 70's, BMV in route. Pt will open eyes to voice. Pupils 8mm and fixed. ) Acuity: Acute Type of Exam: Initial; ORDERING SYSTEM PROVIDED HISTORY: AMS, respiratory distress TECHNOLOGIST PROVIDED HISTORY: Reason for exam:->AMS, respiratory distress Is the patient pregnant?->No Reason for Exam: Respiratory Distress (Pt brought in per [facility] after call from  for resp distress. O2 sat 70's, BMV in route. Pt will open eyes to voice. Pupils 8mm and fixed. ) Acuity: Acute Type of Exam: Initial CT HEAD FINDINGS: BRAIN/VENTRICLES: No acute hemorrhage. Periventricular and subcortical hypoattenuation is nonspecific. Interval chronic lacunar infarcts in the left corona radiata, thalamus, and internal capsule. Artifact partially obscures the laureano. Artifact partially obscures the inferior cerebellum. Ambreen Ocilla white differentiation appears maintained given artifact near the skull base and through the posterior fossa. Mild prominence of the ventricles noted. Overall ventricle size appears increased from prior exam.  There is no midline shift. Basal cisterns appear patent. ORBITS: Visualized orbits appear unremarkable on this unenhanced exam. SINUSES: Mild mucosal thickening of the ethmoid and sphenoid sinuses. Visualized mastoid air cells appear clear. SOFT TISSUES/SKULL: No depressed calvarial fracture. Fluid in the nasopharynx and oropharynx. CT HEAD IMPRESSION: No acute hemorrhage or midline shift. Increased ventricle size compared to prior exam.  Correlate with presentation to exclude acute hydrocephalus. Other findings as described. CT CHEST FINDINGS: Mediastinum: Heart is borderline enlarged. Trace pericardial effusion or thickening. Aorta is within normal limits in size. Pulmonary arteries are within normal limits in size. . Lungs/pleura: Central airways are patent. Endotracheal tube with tip terminating in the distal 3rd subglottic trachea. Secretions noted in the trachea. Opacification of segmental and subsegmental bronchi. Ground-glass the confluent airspace disease. Interlobular septal thickening. Small to moderate pleural effusions. No pneumothorax. Soft Tissues/Bones: Suboptimal evaluation for adenopathy without intravenous contrast. Mediastinal adenopathy. Diffuse body wall edema. Scattered degenerative changes noted in the visualized spine without spondylolisthesis. Upper Abdomen: Limited images of the upper abdomen are unremarkable given lack of intravenous contrast. CT CHEST IMPRESSION: 1.   1. Multifocal airspace disease that likely represents a combination of aspiration/pneumonia and pulmonary edema. 2. Other findings as described. XR CHEST PORTABLE    Result Date: 8/28/2021  EXAMINATION: ONE XRAY VIEW OF THE CHEST 8/28/2021 1:21 am COMPARISON: Chest x-ray dated 02/24/2021. Luic Gonzalez HISTORY: ORDERING SYSTEM PROVIDED HISTORY: central line and OG placement TECHNOLOGIST PROVIDED HISTORY: Reason for exam:->central line and OG placement FINDINGS: LINES/TUBES/OTHER: Endotracheal tube is noted with its tip approximately 5 cm from the ana. Enteric tube is noted coursing below the level of the diaphragm and within the stomach. Left IJ central venous catheter is noted with its tip projecting over the area of the left brachiocephalic vein. HEART/MEDIASTINUM: The cardiac silhouette is mildly enlarged, but stable. PLEURA/LUNGS: There is perihilar fullness and increased interstitial markings which may reflect pulmonary vascular congestion in the correct clinical setting. There is left basilar reflecting airspace disease, atelectasis or pleural effusion. There is right basilar airspace disease. There is no appreciable pneumothorax. BONES/SOFT TISSUE: No acute abnormality. Endotracheal tube and enteric tube are in satisfactory position. The left IJ central venous catheter tip projects over the area of the left brachiocephalic vein. Right basilar airspace disease.  Left basilar opacification reflecting airspace disease, atelectasis or pleural effusion. Conclusions      Summary   *Left ventricle - moderate concentric LVH, normal size and function with EF   of 55%   *Mitral valve - mild regurgitation   *Tricuspid valve - trivial regurgitation   *Bubble study - negative      Signature      ------------------------------------------------------------------   Electronically signed by Mcneil Klinefelter, MD (Interpreting   physician) on 08/31/2020 at 02:48 PM   ------------------------------------------------------------------         Summary   Left ventricular systolic function is normal with ejection fraction   estimated at 55-60 %. No regional wall motion abnormalities are noted. There is mild left ventricular hypertrophy. Normal left ventricular diastolic filling pressure. Moderate mitral regurgitation. Mild pulmonic regurgitation present. IVC size is dilated (>2.1 cm) but collapses > 50% with respiration   consistent with elevated RA pressure (8 mmHg). **There is a small-moderate bilateral pleural effusion. **There is a small circumferential pericardial effusion noted.       Previous echo done 2020 - EF 55%      Signature      ------------------------------------------------------------------   Electronically signed by Gilford Hoover MD, Marjory Reding   (Interpreting physician) on 08/30/2021 at 04:21 PM   ------------------------------------------------------------------

## 2021-09-13 NOTE — PROGRESS NOTES
Physical Therapy  Facility/Department: [facility]  Daily Treatment Note  NAME: Stephani Vasquez  : 1975  MRN: [id number]    Date of Service: 2021    Discharge Recommendations:    Stephani Vasquez scored a 16/24 on the AM-PAC short mobility form. Current research shows that an AM-PAC score of 17 or less is typically not associated with a discharge to the patient's home setting. Based on the patient's AM-PAC score and their current functional mobility deficits, it is recommended that the patient have 5-7 sessions per week of Physical Therapy at d/c to increase the patient's independence. At this time, this patient demonstrates the endurance, and/or tolerance for 3 hours of therapy each day, with a treatment frequency of 5-7x/wk. Please see assessment section for further patient specific details. If patient discharges prior to next session this note will serve as a discharge summary. Please see below for the latest assessment towards goals. PT Equipment Recommendations  Equipment Needed: Yes  Mobility Devices: Sherwin Junior: Rolling    Assessment   Body structures, Functions, Activity limitations: Decreased functional mobility ; Decreased ROM; Decreased strength;Decreased safe awareness;Decreased cognition;Decreased endurance;Decreased vision/visual deficit; Decreased posture;Decreased balance  Assessment: Pt was oriented and demonstrated improved functional mobility with decreased assistance this date. CGA for ambulating short distances with cues for sequence and RW management. Transfers from low level require Mod A.  PT will be necessary to improve above deficits in order to return to PLOF  Treatment Diagnosis: Decreased functional mobility and balance  Prognosis: Good  Decision Making: Medium Complexity  History: ARF  Exam: bed mobility, transfers  PT Education: PT Role;Plan of Care;Orientation;General Safety;Transfer Training;Functional Mobility Training  Patient Education: Pt verbalized understanding but needs reinforcement. Barriers to Learning: orientation  REQUIRES PT FOLLOW UP: Yes  Activity Tolerance  Activity Tolerance: Patient limited by fatigue;Patient limited by endurance; Patient Tolerated treatment well     Patient Diagnosis(es): The primary encounter diagnosis was Pneumonia due to infectious organism, unspecified laterality, unspecified part of lung. Diagnoses of Respiratory failure with hypoxia, unspecified chronicity ([address] and Encephalopathy were also pertinent to this visit. has a past medical history of Blind in both eyes, Cerebral artery occlusion with cerebral infarction ([address] CHF (congestive heart failure) ([address] Chronic kidney disease, Depression, Diabetes mellitus out of control ([address] Diabetes mellitus, type II ([address] Diabetic neuropathy associated with type 2 diabetes mellitus ([address] Generalized headaches, Hypertension, Infertility, Insomnia, Migraine headache, Mixed hyperlipidemia, Otitis media, Pelvic abscess in female, Pneumonia, and Stroke ([address] has a past surgical history that includes Cervix surgery; eye surgery; Foot surgery (Right); Foot surgery (Bilateral); Foot surgery (Left); and IR TUNNELED CVC PLACE WO SQ PORT/PUMP > 5 YEARS (9/7/2021). Restrictions  Restrictions/Precautions  Restrictions/Precautions: General Precautions, Fall Risk (high fall risk)  Position Activity Restriction  Other position/activity restrictions: 47yo admitted through the ER on 8/27 with respiratory distress - intubated - extubated 9/4 - history of DM, CHF, CVA with R sided weakness 2020 - initiated dialysis with patient during this stay with tunnel cath placement 9/7  Subjective   General  Chart Reviewed: Yes  Response To Previous Treatment: Patient with no complaints from previous session. Family / Caregiver Present: No  Subjective  Subjective: Pt denies pain at rest. Agreeable to working with PT/OT.   General Comment  Comments: Pt supine in bed upon arrival. Orientation  Orientation  Overall Orientation Status: Within Functional Limits  Cognition      Objective   Bed mobility  Supine to Sit: Moderate assistance  Scooting: Contact guard assistance;Stand by assistance  Transfers  Sit to Stand: Contact guard assistance; Moderate Assistance  Stand to sit: Contact guard assistance;Minimal Assistance  Comment: increased assist from lower levels for sit to stands. VC for B hnad placement to sit. Ambulation  Ambulation?: Yes  Ambulation 1  Surface: level tile  Device: Rolling Walker  Assistance: Contact guard assistance  Quality of Gait: narrow ROMAINE; step to pattern  Gait Deviations: Slow Kinjal;Decreased step length;Decreased step height  Distance: 15' + 24' + 11'  Comments: significant standing rest break after initial 13'. Seated rest break required after 24'. PT reports RLE weakness, however 2 L knee katie were noted. Pt insists buckling is due to weak LLE and trying to compensate too much onto LLE. Balance  Posture: Good  Sitting - Static: Fair;+  Sitting - Dynamic: Fair  Standing - Static: Fair  Standing - Dynamic: Fair;-  Comments: CGA/SBA sitting EOB during ADL's due to mild posterior lean. Stood with RW support CGA for dynamic midline reaching. Exercises  Knee Long Arc Quad: x10 BLE  Comments: ~8-10 min stretching B HS and Gastroc due to tightness, mild pain, decreased ROM.       AM-PAC Score  AM-PAC Inpatient Mobility Raw Score : 16 (09/13/21 0914)  AM-PAC Inpatient T-Scale Score : 40.78 (09/13/21 0914)  Mobility Inpatient CMS 0-100% Score: 54.16 (09/13/21 0914)  Mobility Inpatient CMS G-Code Modifier : CK (09/13/21 0914)        Goals  Short term goals  Time Frame for Short term goals: discharge  Short term goal 1: Pt will be able to perform bed mobility with min A  Short term goal 2: Pt will be able to perform STS with min A  Short term goal 3: PT will be able to sit at EOB with min A - MET 9/13  Short term goal 4: Pt will be able to stand with LRAD and min A    Plan    Plan  Times per week: 3-5x  Times per day: Daily  Current Treatment Recommendations: Strengthening, ROM, Balance Training, Functional Mobility Training, Transfer Training, Endurance Training, Gait Training, Stair training, Patient/Caregiver Education & Training, Cognitive Reorientation, ADL/Self-care Training, Safety Education & Training  Safety Devices  Type of devices: All fall risk precautions in place, Chair alarm in place, Call light within reach, Patient at risk for falls, Left in chair, Nurse notified, Gait belt  Restraints  Initially in place: No     Therapy Time   Individual Concurrent Group Co-treatment   Time In       0823   Time Out       0907   Minutes       44   Timed Code Treatment Minutes: [id number] [facility] [address] [address]   I agree with the above note. Goals addressed by PT.   Thanks, Wilian Philippe, [address] [facility], DPT [id number]

## 2021-09-13 NOTE — PROGRESS NOTES
Speech Language Pathology  Dysphagia Treatment Note    Name:  Eliceo Krueger  :   1975  Medical Diagnosis:  Acute respiratory failure ([facility] [J96.00]  Encephalopathy [G93.40]  Respiratory failure with hypoxia, unspecified chronicity ([facility] [J96.91]  Pneumonia due to infectious organism, unspecified laterality, unspecified part of lung [J18.9]  Treatment Diagnosis: Oropharyngeal Dysphagia  Pain level: denies     Subjective:  Pt was awake and alert for tx. On Room air.  present for tx. Plan is for d/c to [facility] this date. Diet level prior to treatment: Dysphagia III Soft and Bite-Sized with thin liquids, meds as tolerated   Tolerance of Current Diet Level:Per chart review, no documented difficulties with current diet level noted. Assessment of Texture Tolerance:  -Impressions: Pt was positioned upright in chair. She reported tolerance of current diet level. Voice remains hoarse with reduced vocal volume however, improved from prior sessions. Pt reports some pain on the right side of her throat. Trials of regular solids and thin liquids were provided to assess swallow function. Pt demonstrated prolonged and reduced mastication with x2 swallows to clear oral cavity with regular solid. Adequate oral clearing was achieved. Pt seemingly tolerated successive drinks of thin liquids via straw with no overt clinical s/s of aspiration/penetration. Pt reports that she is typically a slow eater and has always had to take her time eating. Recommend advance to Regular texture diet with thin liquids, meds as tolerate with close monitoring of diet tolerance. Diet and Treatment Recommendations 2021:  Regular texture diet with thin liquids, meds as tolerated     Compensatory strategies: Alternate solids/liquids , Upright as possible with all PO intake , Small bites/sips , Eat/feed slowly, Remain upright 30-45 min     Dysphagia Goals:   1.  Pt will tolerate ongoing assessment of swallow function with diet upgrade as clinically indicated. (ongoing 9/13/2021)   2. Pt will demonstrate understanding of aspiration risk and precautions via education/demonstration with occasional prompting (ongoing 9/13/2021)   3. If clinical s/s of aspiration/penetration continue to be noted, Pt will participate in Modified Barium Swallow Study (ongoing 9/13/2021)  Speech Language Treatment:  Impressions: Pt oriented to person, place, month and year. Able to state that she has been feeling confused. Continues to demonstrate poor recall for current medical status and recent events. Demonstrated improved auditory processing, able to follow verbal directions with approx 80% accuracy. Completed delayed short term recall with 60% accuracy. Able to participate in conversation and able to communicate simple wants/needs this date. She will benefit from ongoing SLP upon d/c from hospital.     GOALS:  Short Term Speech/Language/Cognitive Goals:   1. Pt will improve auditory processing/comprehension of commands and questions to 80%, via graded tasks (ongoing 9/13/2021)   2. Pt will improve orientation and short term recall via graded tasks to 80% (ongoing 9/13/2021)   3. Pt will improve verbal expression for functional expression via graded tasks to 80%(ongoing 9/13/2021)   4. Pt will participate in ongoing cognitive assessment with goals to be established as indicated (ongoing 9/13/2021)      Plan:  Continued daily Dysphagia treatment with goals per plan of care. Patient/Family Education:Education given to the Pt and nurse, who verbalized understanding    Discharge Recommendations:  Pt will benefit from continued skilled Speech Therapy for Dysphagia services, prior to returning home. Timed Code Treatment:  10 minutes     Total Treatment Time:  38 minutes     If patient discharges prior to next session this note will serve as a discharge summary.      Emily Garcia, [address] [facility]  Speech Language Pathologist

## 2021-09-13 NOTE — CARE COORDINATION
Discharge Plan:     Patient discharged to:Discharging to [facility]   Fax: [phone]  Report: [phone]  SW/DC Planner faxed, [address] and AVS to:[facility] [address] [place] [address]   [phone]    Narcotic Prescriptions faxed were:none  RN: (Lew Mason) will call report to:        transported patient to dialysis then to skilled unit  Family advised of discharge?:yes  HENS Submitted?:  yes  All discharge needs met per case management.       RAHEEL CaldwellN, CCM, RN  [facility]  [phone]

## 2021-09-13 NOTE — PROGRESS NOTES
Patient was discharged with  to dialysis center. Then  will take patient to SNF. Will call report to [facility] point. IV and heart monitor removed before discharge. All questions answered no needs at time of discharge.

## 2021-09-13 NOTE — PROGRESS NOTES
Occupational Therapy  Facility/Department: [facility]  Daily Treatment Note  NAME: Luana Chapin  : 1975  MRN: [id number]    Date of Service: 2021    Discharge Recommendations: Luana Chapin scored a 13/24 on the AM-PAC ADL Inpatient form. Current research shows that an AM-PAC score of 17 or less is typically not associated with a discharge to the patient's home setting. Based on the patient's AM-PAC score and their current ADL deficits, it is recommended that the patient have 3-5 sessions per week of Occupational Therapy at d/c to increase the patient's independence. Please see assessment section for further patient specific details. If patient discharges prior to next session this note will serve as a discharge summary. Please see below for the latest assessment towards goals. OT Equipment Recommendations  Equipment Needed: No  Other: defer to next level of care - pt has shower chair and RW    Assessment   Performance deficits / Impairments: Decreased functional mobility ; Decreased ADL status; Decreased strength;Decreased safe awareness;Decreased cognition;Decreased endurance;Decreased balance;Decreased vision/visual deficit; Decreased high-level IADLs;Decreased coordination  Assessment: pt presents with below deficits, significantly below baseline function and would benefit from continued therapy to address these deficits  Treatment Diagnosis: decreased ADL/mobility/activity tolerance  Prognosis: Fair  Decision Making: Medium Complexity  Assistance / Modification: RW  OT Education: OT Role;Plan of Care;Transfer Training;ADL Adaptive Strategies; Energy Conservation  Patient Education: Patient verbalized understanding, reinforcement needed for increased carryover  REQUIRES OT FOLLOW UP: Yes  Activity Tolerance  Activity Tolerance: Patient Tolerated treatment well  Safety Devices  Safety Devices in place: Yes  Type of devices:  All fall risk precautions in place;Gait belt;Patient at risk for Assessment: 0-10  Pain Level: 0  Vital Signs  Patient Currently in Pain: No     Orientation  Orientation  Overall Orientation Status: Within Functional Limits    Objective    ADL  Grooming: Stand by assistance;Setup; Increased time to complete (SBA for grooming tasks seated EOB, brushed teeth with setup/SBA and increased time needed to complete)  LE Dressing: Maximum assistance;Verbal cueing (maxA for donning socks)  Additional Comments: Patient with minimal verbal cueing needed for redirection and hand placement for increased safety awareness with functional ADL activities. Balance  Sitting Balance: Contact guard assistance (CGA progressing to SBA)  Standing Balance: Contact guard assistance (CGA for standing balance)  Standing Balance  Time: ~4-5 minutes standing, ~10 minutes EOB  Activity: Functional transfers, functional mobility  Comment: Patient with 1 L knee buckle  Functional Mobility  Functional - Mobility Device: Rolling Walker  Activity: Other  Assist Level: Contact guard assistance  Functional Mobility Comments: Patient with CGA with use of RW for support, patient with 1 left knee buckle and minimal verbal cueing needed for walker management for increased safety awareness with functional mobility tasks. Bed mobility  Supine to Sit: Moderate assistance (modA progressing to CGA)  Scooting: Contact guard assistance;Stand by assistance  Comment: Patient with HOB slightly elevated and with increased time needed to complete bed mobility tasks. Transfers  Sit to stand: Moderate assistance (modA for sit to stand from low chair, CGA for sit to stand from bed)  Stand to sit: Contact guard assistance  Transfer Comments: Patient with minimal verbal cueing needed for hand placement for increased safety awareness with functional transfers due to decreased vision this date      Vision  Patient Visual Report: Difficulty maintaining concentration with focus; Unable to keep objects in focus; Other (add comment) (Patient with reported black spots in peripheral vision on bilateral R/L eyes.)  Vision Comment: Patient with decreased vision this date, decreased peripheral vision on both R/L sides and difficulty identifying colors  Cognition  Overall Cognitive Status: Exceptions  Arousal/Alertness: Appropriate responses to stimuli  Following Commands: Follows one step commands with repetition; Follows one step commands with increased time; Follows one step commands consistently  Attention Span: Appears intact  Memory: Appears intact  Safety Judgement: Decreased awareness of need for assistance;Decreased awareness of need for safety  Problem Solving: Assistance required to identify errors made;Assistance required to implement solutions  Insights: Decreased awareness of deficits  Initiation: Requires cues for some  Sequencing: Requires cues for some     Perception  Overall Perceptual Status: Impaired  Unilateral Attention: Cues to attend to left side of body;Cues to maintain midline in sitting  Initiation: Appears intact  Motor Planning: Appears intact  Perseveration: Perseverates during ADLs         Plan   Plan  Times per week: 3-5x per week  Times per day: Daily  Specific instructions for Next Treatment: continue cotx to maximize safe performance with mobility  Current Treatment Recommendations: Strengthening, ROM, Balance Training, Functional Mobility Training, Endurance Training, Safety Education & Training, Self-Care / ADL, Positioning, Cognitive Reorientation, Patient/Caregiver Education & Training, Equipment Evaluation, Education, & procurement    AM-PAC Score   AM-[facility] Inpatient Daily Activity Raw Score: 13 (09/13/21 0927)  AM-PAC Inpatient ADL T-Scale Score : 32.03 (09/13/21 0927)  ADL Inpatient CMS 0-100% Score: 63.03 (09/13/21 0927)  ADL Inpatient CMS G-Code Modifier : CL (09/13/21 3246)    Goals  Short term goals  Time Frame for Short term goals: discharge  Short term goal 1: mod A of 1 functional transfers - Danica Finder 9/13  Short term goal 2: mod A of 1 functional mobility with AAE - CGA continue to assess 9/13  Short term goal 3: min A UB ADLs - ongoing 9/13  Short term goal 4: mod A LB ADLs - maxA 9/13  Short term goal 5: pt will tolerate standing 5 minutes for ADL activities - ~4-5 minutes continue to assess 9/13  Long term goals  Time Frame for Long term goals : LTG=STG  Patient Goals   Patient goals : \"to get stronger\"       Therapy Time   Individual Concurrent Group Co-treatment   Time In       7761   Time Out       5322   Minutes       44        Timed Code Treatment Minutes:  44 Minutes    Total Treatment Minutes:  44 minutes       Allen Jorge OTR/L [id number]

## 2021-09-21 NOTE — PROGRESS NOTES
Luana Chapin   55 y.o. female   1975    HPI: Patient was last seen by me on 7/8/2021. During our last office visit:   -This was her first visit with me to establish care as a new PCP. -I have prescribed all her chronic medications.  -Only new medication that was prescribed by me was introduction of GLP-1 agonist (Trulicity 3.20 mg once weekly) to her diabetic medical regiment.  -Patient was referred back to her nephrologist for follow-up. Reason(s) for visit:   Chief Complaint   Patient presents with    Follow-Up from Hospital     Pt was in the hospital due to having another stroke. Total hospital stay 2.5-3 weeks then went [facility].  Shortness of Breath     Crackling. Pt was on a breathing tube for 1 week. Type II DM:  -A1c = 5.7 (8/27/2021)  -Patient stated that she currently is taking Trulicity and 3-6 units of sliding scale insulin. For unclear reasons, she has not been taking her basal insulin.  -Can't recall how her glucose has been averaging lately. ESRD:  -Patient followed by Dr. John Morales    CAD:  -Patient had [address] on 9/8/2021 and findings were overall normal except for RCA showing stenosis of mid 70%. LVEF was about 65%. -Echo showing moderate mitral regurgitation. Generalized anxiety and depression:  -Followed by psychiatrist  -She stated that her current feelings of SOB has worsened her anxiety and vice-versa. Has some relief with taking Prozac. Hospital follow-up:    Patient was hospitalized at [facility] from 8/27/2021-9/13/2021. She presented with significant respiratory distress with cyanosis and had to be intubated. She was also found to have pneumonia and placed on IV abx. She was also noted to have acute pulmonary edema and diuresed with Furosemide. She has advanced CKD and was followed by her nephrologist.  She was placed on hemodialysis on 8/31/2021. Patient reported that she went to rehab for 8 days.     Since her hospital discharge, she reported that she has issues of dyspnea at rest, but more exertion. She denied having ALVAREZ with getting up. Patient reported that she has a lot of phlegm production and has difficulty expectorating. She's not home O2. She only is on Albuterol HFA. Doesn't have NEB. She stated that she has been using Albuterol HFA 3x/day. She quit smoking on 8/27/2021. No withdrawal issues. Patient's  quit his job at [employer] is a full time caretaker. Allergies   Allergen Reactions    Amoxicillin Itching and Hives     Tolerates cephalosporins  Patient tolerating cefazolin (ANCEF) as of October 11, 2018  Patient tolerating cefazolin (ANCEF) as of October 11, 2018  Tolerates cephalosporins  Patient tolerating cefazolin (ANCEF) as of October 11, 2018    Levofloxacin Anaphylaxis    Vancomycin Shortness Of Breath, Hives and Anaphylaxis    Tape [Adhesive Tape] Other (See Comments)     Paper tape turns skin bright red. Plastic tape okay.         Current Outpatient Medications on File Prior to Visit   Medication Sig Dispense Refill    carvedilol (COREG) 6.25 MG tablet Take 6.25 mg by mouth 2 times daily (with meals)      clonazePAM (KLONOPIN) 0.5 MG tablet       cloNIDine (CATAPRES) 0.2 MG/24HR PTWK Place 1 patch onto the skin once a week 4 patch 2    glucose (GLUTOSE) 40 % GEL Take 37.5 mLs by mouth as needed (low blood sugar) 45 g 1    FLUoxetine (PROZAC) 20 MG capsule Take 1 capsule by mouth daily 30 capsule 3    furosemide (LASIX) 40 MG tablet Take 1 tablet by mouth daily 60 tablet 3    Dulaglutide 0.75 MG/0.5ML SOPN Inject 0.75 mg into the skin once a week 4 pen 1    amLODIPine (NORVASC) 10 MG tablet Take 1 tablet by mouth daily 30 tablet 1    spironolactone (ALDACTONE) 50 MG tablet Take 1 tablet by mouth daily 30 tablet 2    atorvastatin (LIPITOR) 40 MG tablet Take 1 tablet by mouth nightly 30 tablet 3    Insulin Pen Needle 29G X 12.7MM MISC 1 each by Does not apply route daily 09/13/2021     09/13/2021       Chemistry        Component Value Date/Time     (L) 09/13/2021 0550    K 3.9 09/13/2021 0550    K 3.3 (L) 08/28/2021 0420     09/13/2021 0550    CO2 15 (L) 09/13/2021 0550    BUN 41 (H) 09/13/2021 0550    CREATININE 6.3 (HH) 09/13/2021 0550        Component Value Date/Time    CALCIUM 8.6 09/13/2021 0550    ALKPHOS 102 09/13/2021 0550    AST 10 (L) 09/13/2021 0550    ALT <5 (L) 09/13/2021 0550    BILITOT 0.3 09/13/2021 0550          Lab Results   Component Value Date    ALT <5 (L) 09/13/2021    AST 10 (L) 09/13/2021    ALKPHOS 102 09/13/2021    BILITOT 0.3 09/13/2021     Lab Results   Component Value Date    LABA1C 5.7 08/27/2021     Lab Results   Component Value Date    .9 08/27/2021       Review of Systems   Constitutional: Positive for activity change. Negative for appetite change, fatigue, fever and unexpected weight change. HENT: Negative for congestion, rhinorrhea, sinus pressure and trouble swallowing. Respiratory: Positive for cough, chest tightness and shortness of breath. Negative for wheezing. Cardiovascular: Negative for chest pain, palpitations and leg swelling. Gastrointestinal: Negative for abdominal distention, abdominal pain, blood in stool, constipation, diarrhea, nausea and vomiting. Genitourinary: Negative for dysuria, frequency and hematuria. Musculoskeletal: Negative for arthralgias and back pain. Skin: Negative for rash. Neurological: Positive for weakness. Negative for dizziness, light-headedness, numbness and headaches. Psychiatric/Behavioral: The patient is nervous/anxious.       Wt Readings from Last 3 Encounters:   09/23/21 190 lb 9.6 oz (86.5 kg)   09/13/21 176 lb 12.9 oz (80.2 kg)   07/08/21 244 lb 11.2 oz (111 kg)       BP Readings from Last 3 Encounters:   09/23/21 130/84   09/13/21 (!) 146/67   07/08/21 (!) 160/100       Pulse Readings from Last 3 Encounters:   09/23/21 95   09/13/21 93   07/08/21 97       BP 130/84   Pulse 95   Temp 96.9 °F (36.1 °C)   Wt 190 lb 9.6 oz (86.5 kg)   SpO2 99%   BMI 30.76 kg/m²      Physical Exam  Vitals reviewed. Constitutional:       General: She is awake. She is not in acute distress. Appearance: She is obese. She is not ill-appearing or diaphoretic. HENT:      Head: Normocephalic and atraumatic. No abrasion or masses. Hair is normal.      Right Ear: External ear normal.      Left Ear: External ear normal.      Nose: Nose normal.   Eyes:      General: Lids are normal. Gaze aligned appropriately. No scleral icterus. Right eye: No discharge. Left eye: No discharge. Extraocular Movements: Extraocular movements intact. Conjunctiva/sclera: Conjunctivae normal.   Neck:      Trachea: Phonation normal.   Cardiovascular:      Rate and Rhythm: Normal rate and regular rhythm. Pulmonary:      Effort: Pulmonary effort is normal. No respiratory distress. Breath sounds: No wheezing, rhonchi or rales. Abdominal:      General: Abdomen is flat. There is no distension. Palpations: Abdomen is soft. Musculoskeletal:         General: No deformity. Normal range of motion. Cervical back: Normal range of motion. No erythema. Right lower leg: No edema. Left lower leg: No edema. Skin:     Coloration: Skin is not cyanotic, jaundiced or pale. Findings: No abrasion, abscess, bruising, ecchymosis, erythema, signs of injury, laceration, lesion, petechiae, rash or wound. Neurological:      General: No focal deficit present. Mental Status: She is alert. Mental status is at baseline. GCS: GCS eye subscore is 4. GCS verbal subscore is 5. GCS motor subscore is 6. Cranial Nerves: No cranial nerve deficit, dysarthria or facial asymmetry. Motor: No weakness, tremor, atrophy or seizure activity. Coordination: Coordination normal.      Gait: Gait is intact.    Psychiatric:         Attention and Perception: Attention and perception normal.         Mood and Affect: Mood and affect normal.         Speech: Speech normal.         Behavior: Behavior normal. Behavior is cooperative. Thought Content: Thought content normal.       Assessment/Plan:   Shanice Dwyer was seen today for follow-up from hospital and shortness of breath. Diagnoses and all orders for this visit:    Acute pulmonary edema ([facility] [address]  Comments:  Continue Furosemide. Continue Albuterol PRN and will start on Bevespi. ESRD (end stage renal disease) ([facility] [address]  Comments: Follow up with nephrologist as directed. Type 2 diabetes mellitus with other specified complication, with long-term current use of insulin ([facility])  Comments:  Controlled. Will restart basal insulin (Lantus) - 10 units nightly. Continue insulin sliding scale. Chronic obstructive pulmonary disease, unspecified COPD type ([facility]  Comments:  Given extensive smoking hx, likely has some level of COPD. Will start on maintenance inhaler. Orders:  -     glycopyrrolate-formoterol (Dave Peterson) 9-4.8 MCG/ACT AERO; Inhale 2 puffs into the lungs 2 times daily    Essential hypertension  Comments:  BP overall stable. Chest congestion  Comments:  Informed patient that addition of Bevespi could help with mucus secretion. Educated about 2019 novel coronavirus infection  Comments:  Recommended getting COVID-19 vaccine. Other: advised patient and  to contact her health insurance about establishing home health aide and nurse. I reviewed the plan of care with the patient. Patient acknowledged understanding and agreed with plan of care overall.     Medications Discontinued During This Encounter   Medication Reason    lisinopril (PRINIVIL;ZESTRIL) 40 MG tablet LIST CLEANUP    LORazepam (ATIVAN) 0.5 MG tablet Alternate therapy        General information on medications:  -When it comes to medications, whether with starting or adding a new medication or increasing the dose of a current medication, the benefits and risks have to always be considered and weighed over, especially if one is taking other medications as well. -There are no medications that have no side effects and that there is always a risk involved with taking a medication.    -If a side effect were to occur with starting a new medication or with increasing the dose of a current medication that either the medication can be totally discontinued altogether or simply decrease the dose of it and if this would be the case a follow-up appointment would be deemed necessary.    -The drug allergy list will then be updated with the corresponding side effect(s) if it's deemed to be a true 'drug allergy'. -The most common adverse effects of medication(s) were addressed at today's visit.    -Lastly, the coverage status of a medication may vary from insurance to insurance and the only way to verify if the medication is covered is to send an actual prescription in.    -The drug formulary of each insurance changes without any warning or notification to the healthcare provider let alone the pharmacy.  -The cost of medications vary from insurance to insurance and the cost is always subject to change just like the drug formulary. Follow-up: Return in about 4 weeks (around 10/21/2021) for IP - COPD. Elana Schaefer Patient was informed that if his or her symptoms worsen to follow up with me sooner or go to the nearest ER if the symptoms are very significant and warrant higher level of care. Regarding my note:  -This note was composed (by me only and not with assistance via a scribe) to the best of my knowledge and recollection of the encounter with the patient using one of my own customized note templates utilizing a combination of typing and dictating with the [address] [place] [address] speech recognition software.   As a result, the note may possibly contain various errors (e.g. spelling, grammar, and non-sensible words/phrases/statements) despite reviewing the

## 2021-09-23 ENCOUNTER — OFFICE VISIT (OUTPATIENT)
Dept: FAMILY MEDICINE CLINIC | Age: 46
End: 2021-09-23
Payer: COMMERCIAL

## 2021-09-23 VITALS
BODY MASS INDEX: 30.76 KG/M2 | TEMPERATURE: 96.9 F | HEART RATE: 95 BPM | SYSTOLIC BLOOD PRESSURE: 130 MMHG | WEIGHT: 190.6 LBS | DIASTOLIC BLOOD PRESSURE: 84 MMHG | OXYGEN SATURATION: 99 %

## 2021-09-23 DIAGNOSIS — E11.69 TYPE 2 DIABETES MELLITUS WITH OTHER SPECIFIED COMPLICATION, WITH LONG-TERM CURRENT USE OF INSULIN (HCC): ICD-10-CM

## 2021-09-23 DIAGNOSIS — N18.6 ESRD (END STAGE RENAL DISEASE) (HCC): ICD-10-CM

## 2021-09-23 DIAGNOSIS — R09.89 CHEST CONGESTION: ICD-10-CM

## 2021-09-23 DIAGNOSIS — J44.9 CHRONIC OBSTRUCTIVE PULMONARY DISEASE, UNSPECIFIED COPD TYPE (HCC): ICD-10-CM

## 2021-09-23 DIAGNOSIS — J81.0 ACUTE PULMONARY EDEMA (HCC): Primary | ICD-10-CM

## 2021-09-23 DIAGNOSIS — I10 ESSENTIAL HYPERTENSION: ICD-10-CM

## 2021-09-23 DIAGNOSIS — Z79.4 TYPE 2 DIABETES MELLITUS WITH OTHER SPECIFIED COMPLICATION, WITH LONG-TERM CURRENT USE OF INSULIN (HCC): ICD-10-CM

## 2021-09-23 DIAGNOSIS — Z71.89 EDUCATED ABOUT 2019 NOVEL CORONAVIRUS INFECTION: ICD-10-CM

## 2021-09-23 PROBLEM — R26.9 ABNORMAL GAIT: Status: ACTIVE | Noted: 2021-09-23

## 2021-09-23 PROBLEM — L03.115 CELLULITIS OF RIGHT FOOT: Status: ACTIVE | Noted: 2018-10-03

## 2021-09-23 PROBLEM — L73.2 HIDRADENITIS SUPPURATIVA: Status: ACTIVE | Noted: 2018-10-03

## 2021-09-23 PROBLEM — E83.51 HYPOCALCEMIA: Status: ACTIVE | Noted: 2021-09-23

## 2021-09-23 PROBLEM — E11.42 POLYNEUROPATHY DUE TO TYPE 2 DIABETES MELLITUS (HCC): Status: ACTIVE | Noted: 2021-09-23

## 2021-09-23 PROBLEM — E04.2 NON-TOXIC MULTINODULAR GOITER: Status: ACTIVE | Noted: 2021-09-23

## 2021-09-23 PROBLEM — E11.3523 BOTH EYES AFFECTED BY PROLIFERATIVE DIABETIC RETINOPATHY WITH TRACTION RETINAL DETACHMENTS INVOLVING MACULAE, ASSOCIATED WITH TYPE 2 DIABETES MELLITUS (HCC): Status: ACTIVE | Noted: 2020-06-17

## 2021-09-23 PROBLEM — E11.3599 PROLIFERATIVE DIABETIC RETINOPATHY ASSOCIATED WITH TYPE 2 DIABETES MELLITUS (HCC): Status: ACTIVE | Noted: 2020-04-23

## 2021-09-23 PROCEDURE — G8417 CALC BMI ABV UP PARAM F/U: HCPCS | Performed by: FAMILY MEDICINE

## 2021-09-23 PROCEDURE — 4004F PT TOBACCO SCREEN RCVD TLK: CPT | Performed by: FAMILY MEDICINE

## 2021-09-23 PROCEDURE — 2022F DILAT RTA XM EVC RTNOPTHY: CPT | Performed by: FAMILY MEDICINE

## 2021-09-23 PROCEDURE — 3023F SPIROM DOC REV: CPT | Performed by: FAMILY MEDICINE

## 2021-09-23 PROCEDURE — 99215 OFFICE O/P EST HI 40 MIN: CPT | Performed by: FAMILY MEDICINE

## 2021-09-23 PROCEDURE — G8926 SPIRO NO PERF OR DOC: HCPCS | Performed by: FAMILY MEDICINE

## 2021-09-23 PROCEDURE — 1111F DSCHRG MED/CURRENT MED MERGE: CPT | Performed by: FAMILY MEDICINE

## 2021-09-23 PROCEDURE — G8427 DOCREV CUR MEDS BY ELIG CLIN: HCPCS | Performed by: FAMILY MEDICINE

## 2021-09-23 PROCEDURE — 3044F HG A1C LEVEL LT 7.0%: CPT | Performed by: FAMILY MEDICINE

## 2021-09-23 RX ORDER — CLONAZEPAM 0.5 MG/1
0.5 TABLET ORAL 2 TIMES DAILY PRN
Status: ON HOLD | COMMUNITY
Start: 2021-08-20 | End: 2021-11-03

## 2021-09-23 RX ORDER — CARVEDILOL 6.25 MG/1
6.25 TABLET ORAL 2 TIMES DAILY WITH MEALS
COMMUNITY
Start: 2021-03-23 | End: 2021-10-04 | Stop reason: ALTCHOICE

## 2021-09-23 ASSESSMENT — ENCOUNTER SYMPTOMS
RHINORRHEA: 0
CONSTIPATION: 0
ABDOMINAL DISTENTION: 0
SHORTNESS OF BREATH: 1
SINUS PRESSURE: 0
NAUSEA: 0
COUGH: 1
BLOOD IN STOOL: 0
TROUBLE SWALLOWING: 0
BACK PAIN: 0
DIARRHEA: 0
CHEST TIGHTNESS: 1
ABDOMINAL PAIN: 0
VOMITING: 0
WHEEZING: 0

## 2021-09-23 NOTE — PROGRESS NOTES
Physician Progress Note      PATIENT:               Ramesh Turner  CSN #:                  [id number]  :                       1975  ADMIT DATE:       2021 6:52 PM  Rodriguez Starkey DATE:        2021 12:28 PM  RESPONDING  PROVIDER #:        Raissa Tenorio MD          QUERY TEXT:    Pt admitted with Sepsis. Pt noted to have Pneumonia. If possible, please   document in the progress notes and discharge summary if you are evaluating   and/or treating any of the following:    Note: CAP and HCAP indicate where the pneumonia was acquired, not a specific   type. The medical record reflects the following:  Risk Factors: Sepsis, PNA, HTN, DM type 2, CKD 4, KINZA, HLD  Clinical Indicators: Pathology 21 Sputum Cx \"Organism Candida albicans. \"    Per Nephrology progress note 21 \"Multifocal airspace disease that   likely represents a combination of aspiration/pneumonia and pulmonary edema. \"  Treatment: Pulmonary/Nephrology/Cardiology/Psychiatry Consults, iv Maxipime,   CxR, monitor labs  Options provided:  -- Gram negative pneumonia  -- Gram positive pneumonia  -- Fungal pneumonia  -- Aspiration pneumonia  -- Other - I will add my own diagnosis  -- Disagree - Not applicable / Not valid  -- Disagree - Clinically unable to determine / Unknown  -- Refer to Clinical Documentation Reviewer    PROVIDER RESPONSE TEXT:    Likely aspiration pneumonia. She was treated with cefepime.     Query created by: Kerry Davis on 9/10/2021 4:53 PM      Electronically signed by:  Raissa Tenorio MD 2021 7:55 PM

## 2021-10-04 ENCOUNTER — TELEPHONE (OUTPATIENT)
Dept: OTHER | Facility: CLINIC | Age: 46
End: 2021-10-04

## 2021-10-04 ENCOUNTER — HOSPITAL ENCOUNTER (INPATIENT)
Age: 46
LOS: 1 days | Discharge: HOME | DRG: 194 | End: 2021-10-05
Attending: INTERNAL MEDICINE | Admitting: INTERNAL MEDICINE
Payer: COMMERCIAL

## 2021-10-04 ENCOUNTER — APPOINTMENT (OUTPATIENT)
Dept: GENERAL RADIOLOGY | Age: 46
DRG: 194 | End: 2021-10-04
Payer: COMMERCIAL

## 2021-10-04 DIAGNOSIS — R09.02 HYPOXIA: ICD-10-CM

## 2021-10-04 DIAGNOSIS — D64.9 SYMPTOMATIC ANEMIA: Primary | ICD-10-CM

## 2021-10-04 DIAGNOSIS — Z99.2 ESRD ON HEMODIALYSIS (HCC): ICD-10-CM

## 2021-10-04 DIAGNOSIS — N18.6 ESRD ON HEMODIALYSIS (HCC): ICD-10-CM

## 2021-10-04 PROBLEM — J96.01 ACUTE RESPIRATORY FAILURE WITH HYPOXEMIA (HCC): Status: ACTIVE | Noted: 2021-10-04

## 2021-10-04 LAB
A/G RATIO: 1.1 (ref 1.1–2.2)
ABO/RH: NORMAL
ALBUMIN SERPL-MCNC: 3.3 G/DL (ref 3.4–5)
ALP BLD-CCNC: 110 U/L (ref 40–129)
ALT SERPL-CCNC: 13 U/L (ref 10–40)
ANION GAP SERPL CALCULATED.3IONS-SCNC: 13 MMOL/L (ref 3–16)
ANTIBODY SCREEN: NORMAL
AST SERPL-CCNC: 15 U/L (ref 15–37)
BASOPHILS ABSOLUTE: 0.1 K/UL (ref 0–0.2)
BASOPHILS RELATIVE PERCENT: 0.5 %
BILIRUB SERPL-MCNC: <0.2 MG/DL (ref 0–1)
BUN BLDV-MCNC: 50 MG/DL (ref 7–20)
CALCIUM SERPL-MCNC: 8.4 MG/DL (ref 8.3–10.6)
CHLORIDE BLD-SCNC: 104 MMOL/L (ref 99–110)
CO2: 21 MMOL/L (ref 21–32)
CREAT SERPL-MCNC: 5 MG/DL (ref 0.6–1.1)
EKG ATRIAL RATE: 84 BPM
EKG DIAGNOSIS: NORMAL
EKG P AXIS: 32 DEGREES
EKG P-R INTERVAL: 130 MS
EKG Q-T INTERVAL: 376 MS
EKG QRS DURATION: 76 MS
EKG QTC CALCULATION (BAZETT): 444 MS
EKG R AXIS: 22 DEGREES
EKG T AXIS: 76 DEGREES
EKG VENTRICULAR RATE: 84 BPM
EOSINOPHILS ABSOLUTE: 0.2 K/UL (ref 0–0.6)
EOSINOPHILS RELATIVE PERCENT: 2.4 %
GFR AFRICAN AMERICAN: 11
GFR NON-AFRICAN AMERICAN: 9
GLOBULIN: 3 G/DL
GLUCOSE BLD-MCNC: 101 MG/DL (ref 70–99)
GLUCOSE BLD-MCNC: 104 MG/DL (ref 70–99)
HCT VFR BLD CALC: 20.3 % (ref 36–48)
HEMOGLOBIN: 6.6 G/DL (ref 12–16)
LYMPHOCYTES ABSOLUTE: 1.8 K/UL (ref 1–5.1)
LYMPHOCYTES RELATIVE PERCENT: 18 %
MCH RBC QN AUTO: 28.5 PG (ref 26–34)
MCHC RBC AUTO-ENTMCNC: 32.4 G/DL (ref 31–36)
MCV RBC AUTO: 88 FL (ref 80–100)
MONOCYTES ABSOLUTE: 0.8 K/UL (ref 0–1.3)
MONOCYTES RELATIVE PERCENT: 8.6 %
NEUTROPHILS ABSOLUTE: 6.8 K/UL (ref 1.7–7.7)
NEUTROPHILS RELATIVE PERCENT: 70.5 %
OCCULT BLOOD DIAGNOSTIC: NORMAL
PDW BLD-RTO: 16.9 % (ref 12.4–15.4)
PERFORMED ON: ABNORMAL
PLATELET # BLD: 277 K/UL (ref 135–450)
PMV BLD AUTO: 9.5 FL (ref 5–10.5)
POTASSIUM REFLEX MAGNESIUM: 5.3 MMOL/L (ref 3.5–5.1)
RBC # BLD: 2.31 M/UL (ref 4–5.2)
REASON FOR REJECTION: NORMAL
REJECTED TEST: NORMAL
SODIUM BLD-SCNC: 138 MMOL/L (ref 136–145)
TOTAL PROTEIN: 6.3 G/DL (ref 6.4–8.2)
WBC # BLD: 9.7 K/UL (ref 4–11)

## 2021-10-04 PROCEDURE — 96374 THER/PROPH/DIAG INJ IV PUSH: CPT

## 2021-10-04 PROCEDURE — 82270 OCCULT BLOOD FECES: CPT

## 2021-10-04 PROCEDURE — P9016 RBC LEUKOCYTES REDUCED: HCPCS

## 2021-10-04 PROCEDURE — 2580000003 HC RX 258: Performed by: GENERAL ACUTE CARE HOSPITAL

## 2021-10-04 PROCEDURE — 80053 COMPREHEN METABOLIC PANEL: CPT

## 2021-10-04 PROCEDURE — U0003 INFECTIOUS AGENT DETECTION BY NUCLEIC ACID (DNA OR RNA); SEVERE ACUTE RESPIRATORY SYNDROME CORONAVIRUS 2 (SARS-COV-2) (CORONAVIRUS DISEASE [COVID-19]), AMPLIFIED PROBE TECHNIQUE, MAKING USE OF HIGH THROUGHPUT TECHNOLOGIES AS DESCRIBED BY CMS-2020-01-R: HCPCS

## 2021-10-04 PROCEDURE — 94760 N-INVAS EAR/PLS OXIMETRY 1: CPT

## 2021-10-04 PROCEDURE — 90935 HEMODIALYSIS ONE EVALUATION: CPT

## 2021-10-04 PROCEDURE — G0378 HOSPITAL OBSERVATION PER HR: HCPCS

## 2021-10-04 PROCEDURE — 93010 ELECTROCARDIOGRAM REPORT: CPT | Performed by: INTERNAL MEDICINE

## 2021-10-04 PROCEDURE — 93005 ELECTROCARDIOGRAM TRACING: CPT | Performed by: GENERAL ACUTE CARE HOSPITAL

## 2021-10-04 PROCEDURE — 6370000000 HC RX 637 (ALT 250 FOR IP): Performed by: INTERNAL MEDICINE

## 2021-10-04 PROCEDURE — 2580000003 HC RX 258: Performed by: INTERNAL MEDICINE

## 2021-10-04 PROCEDURE — 2700000000 HC OXYGEN THERAPY PER DAY

## 2021-10-04 PROCEDURE — U0005 INFEC AGEN DETEC AMPLI PROBE: HCPCS

## 2021-10-04 PROCEDURE — 99281 EMR DPT VST MAYX REQ PHY/QHP: CPT

## 2021-10-04 PROCEDURE — 71045 X-RAY EXAM CHEST 1 VIEW: CPT

## 2021-10-04 PROCEDURE — 86923 COMPATIBILITY TEST ELECTRIC: CPT

## 2021-10-04 PROCEDURE — 86850 RBC ANTIBODY SCREEN: CPT

## 2021-10-04 PROCEDURE — 86901 BLOOD TYPING SEROLOGIC RH(D): CPT

## 2021-10-04 PROCEDURE — 1200000000 HC SEMI PRIVATE

## 2021-10-04 PROCEDURE — 86900 BLOOD TYPING SEROLOGIC ABO: CPT

## 2021-10-04 PROCEDURE — 96372 THER/PROPH/DIAG INJ SC/IM: CPT

## 2021-10-04 PROCEDURE — 36415 COLL VENOUS BLD VENIPUNCTURE: CPT

## 2021-10-04 PROCEDURE — 85025 COMPLETE CBC W/AUTO DIFF WBC: CPT

## 2021-10-04 PROCEDURE — 6360000002 HC RX W HCPCS: Performed by: INTERNAL MEDICINE

## 2021-10-04 RX ORDER — BLOOD-GLUCOSE METER
1 KIT MISCELLANEOUS DAILY
Status: DISCONTINUED | OUTPATIENT
Start: 2021-10-04 | End: 2021-10-04

## 2021-10-04 RX ORDER — AMLODIPINE BESYLATE 5 MG/1
10 TABLET ORAL DAILY
Status: DISCONTINUED | OUTPATIENT
Start: 2021-10-04 | End: 2021-10-05 | Stop reason: HOSPADM

## 2021-10-04 RX ORDER — CARVEDILOL 3.12 MG/1
6.25 TABLET ORAL 2 TIMES DAILY WITH MEALS
Status: CANCELLED | OUTPATIENT
Start: 2021-10-04

## 2021-10-04 RX ORDER — INSULIN GLARGINE 100 [IU]/ML
10 INJECTION, SOLUTION SUBCUTANEOUS EVERY MORNING
COMMUNITY
End: 2022-03-17

## 2021-10-04 RX ORDER — ONDANSETRON 2 MG/ML
4 INJECTION INTRAMUSCULAR; INTRAVENOUS EVERY 6 HOURS PRN
Status: DISCONTINUED | OUTPATIENT
Start: 2021-10-04 | End: 2021-10-05 | Stop reason: HOSPADM

## 2021-10-04 RX ORDER — ASPIRIN 81 MG/1
81 TABLET ORAL DAILY
Status: DISCONTINUED | OUTPATIENT
Start: 2021-10-04 | End: 2021-10-05 | Stop reason: HOSPADM

## 2021-10-04 RX ORDER — SODIUM CHLORIDE 9 MG/ML
INJECTION, SOLUTION INTRAVENOUS PRN
Status: DISCONTINUED | OUTPATIENT
Start: 2021-10-04 | End: 2021-10-05 | Stop reason: HOSPADM

## 2021-10-04 RX ORDER — PROPRANOLOL HYDROCHLORIDE 80 MG/1
80 CAPSULE, EXTENDED RELEASE ORAL EVERY MORNING
Status: DISCONTINUED | OUTPATIENT
Start: 2021-10-05 | End: 2021-10-05 | Stop reason: HOSPADM

## 2021-10-04 RX ORDER — POTASSIUM CHLORIDE 7.45 MG/ML
10 INJECTION INTRAVENOUS PRN
Status: DISCONTINUED | OUTPATIENT
Start: 2021-10-04 | End: 2021-10-04

## 2021-10-04 RX ORDER — PREGABALIN 75 MG/1
75 CAPSULE ORAL NIGHTLY
Status: DISCONTINUED | OUTPATIENT
Start: 2021-10-04 | End: 2021-10-05 | Stop reason: HOSPADM

## 2021-10-04 RX ORDER — FUROSEMIDE 80 MG
40 TABLET ORAL DAILY
COMMUNITY
End: 2021-11-01

## 2021-10-04 RX ORDER — SERTRALINE HYDROCHLORIDE 100 MG/1
100 TABLET, FILM COATED ORAL DAILY
COMMUNITY
End: 2021-10-04 | Stop reason: ALTCHOICE

## 2021-10-04 RX ORDER — HEPARIN SODIUM 1000 [USP'U]/ML
3600 INJECTION, SOLUTION INTRAVENOUS; SUBCUTANEOUS PRN
Status: DISCONTINUED | OUTPATIENT
Start: 2021-10-04 | End: 2021-10-05 | Stop reason: HOSPADM

## 2021-10-04 RX ORDER — FLUOXETINE HYDROCHLORIDE 20 MG/1
20 CAPSULE ORAL DAILY
Status: DISCONTINUED | OUTPATIENT
Start: 2021-10-04 | End: 2021-10-05 | Stop reason: HOSPADM

## 2021-10-04 RX ORDER — HEPARIN SODIUM 5000 [USP'U]/ML
5000 INJECTION, SOLUTION INTRAVENOUS; SUBCUTANEOUS EVERY 8 HOURS SCHEDULED
Status: DISCONTINUED | OUTPATIENT
Start: 2021-10-04 | End: 2021-10-05 | Stop reason: HOSPADM

## 2021-10-04 RX ORDER — ACETAMINOPHEN 650 MG/1
650 SUPPOSITORY RECTAL EVERY 6 HOURS PRN
Status: DISCONTINUED | OUTPATIENT
Start: 2021-10-04 | End: 2021-10-05 | Stop reason: HOSPADM

## 2021-10-04 RX ORDER — FUROSEMIDE 40 MG/1
80 TABLET ORAL DAILY
Status: DISCONTINUED | OUTPATIENT
Start: 2021-10-04 | End: 2021-10-05 | Stop reason: HOSPADM

## 2021-10-04 RX ORDER — SODIUM CHLORIDE 0.9 % (FLUSH) 0.9 %
10 SYRINGE (ML) INJECTION EVERY 12 HOURS SCHEDULED
Status: DISCONTINUED | OUTPATIENT
Start: 2021-10-04 | End: 2021-10-05 | Stop reason: HOSPADM

## 2021-10-04 RX ORDER — SODIUM CHLORIDE 9 MG/ML
INJECTION, SOLUTION INTRAVENOUS PRN
Status: COMPLETED | OUTPATIENT
Start: 2021-10-04 | End: 2021-10-04

## 2021-10-04 RX ORDER — ONDANSETRON 4 MG/1
4 TABLET, ORALLY DISINTEGRATING ORAL EVERY 8 HOURS PRN
Status: DISCONTINUED | OUTPATIENT
Start: 2021-10-04 | End: 2021-10-05 | Stop reason: HOSPADM

## 2021-10-04 RX ORDER — DEXAMETHASONE SODIUM PHOSPHATE 10 MG/ML
6 INJECTION, SOLUTION INTRAMUSCULAR; INTRAVENOUS EVERY 24 HOURS
Status: DISCONTINUED | OUTPATIENT
Start: 2021-10-04 | End: 2021-10-05 | Stop reason: HOSPADM

## 2021-10-04 RX ORDER — SPIRONOLACTONE 25 MG/1
50 TABLET ORAL DAILY
Status: DISCONTINUED | OUTPATIENT
Start: 2021-10-04 | End: 2021-10-05 | Stop reason: HOSPADM

## 2021-10-04 RX ORDER — HYDRALAZINE HYDROCHLORIDE 20 MG/ML
10 INJECTION INTRAMUSCULAR; INTRAVENOUS EVERY 6 HOURS PRN
Status: DISCONTINUED | OUTPATIENT
Start: 2021-10-04 | End: 2021-10-05 | Stop reason: HOSPADM

## 2021-10-04 RX ORDER — SODIUM CHLORIDE 0.9 % (FLUSH) 0.9 %
10 SYRINGE (ML) INJECTION PRN
Status: DISCONTINUED | OUTPATIENT
Start: 2021-10-04 | End: 2021-10-05 | Stop reason: HOSPADM

## 2021-10-04 RX ORDER — ATORVASTATIN CALCIUM 40 MG/1
40 TABLET, FILM COATED ORAL NIGHTLY
Status: DISCONTINUED | OUTPATIENT
Start: 2021-10-04 | End: 2021-10-05 | Stop reason: HOSPADM

## 2021-10-04 RX ORDER — LISINOPRIL 40 MG/1
40 TABLET ORAL DAILY
Status: ON HOLD | COMMUNITY
End: 2022-01-21 | Stop reason: HOSPADM

## 2021-10-04 RX ORDER — CLONAZEPAM 0.5 MG/1
0.5 TABLET ORAL 2 TIMES DAILY PRN
Status: DISCONTINUED | OUTPATIENT
Start: 2021-10-04 | End: 2021-10-05 | Stop reason: HOSPADM

## 2021-10-04 RX ORDER — CLONIDINE 0.2 MG/24H
1 PATCH, EXTENDED RELEASE TRANSDERMAL WEEKLY
Status: DISCONTINUED | OUTPATIENT
Start: 2021-10-08 | End: 2021-10-05 | Stop reason: HOSPADM

## 2021-10-04 RX ORDER — SODIUM CHLORIDE 9 MG/ML
25 INJECTION, SOLUTION INTRAVENOUS PRN
Status: DISCONTINUED | OUTPATIENT
Start: 2021-10-04 | End: 2021-10-05 | Stop reason: HOSPADM

## 2021-10-04 RX ORDER — POTASSIUM CHLORIDE 20 MEQ/1
40 TABLET, EXTENDED RELEASE ORAL PRN
Status: DISCONTINUED | OUTPATIENT
Start: 2021-10-04 | End: 2021-10-04

## 2021-10-04 RX ORDER — 0.9 % SODIUM CHLORIDE 0.9 %
500 INTRAVENOUS SOLUTION INTRAVENOUS PRN
Status: DISCONTINUED | OUTPATIENT
Start: 2021-10-04 | End: 2021-10-05 | Stop reason: HOSPADM

## 2021-10-04 RX ORDER — ACETAMINOPHEN 325 MG/1
650 TABLET ORAL EVERY 6 HOURS PRN
Status: DISCONTINUED | OUTPATIENT
Start: 2021-10-04 | End: 2021-10-05 | Stop reason: HOSPADM

## 2021-10-04 RX ORDER — NICOTINE POLACRILEX 4 MG
15 LOZENGE BUCCAL PRN
Status: DISCONTINUED | OUTPATIENT
Start: 2021-10-04 | End: 2021-10-05 | Stop reason: HOSPADM

## 2021-10-04 RX ADMIN — FLUOXETINE 20 MG: 20 CAPSULE ORAL at 22:01

## 2021-10-04 RX ADMIN — DEXAMETHASONE SODIUM PHOSPHATE 6 MG: 10 INJECTION, SOLUTION INTRAMUSCULAR; INTRAVENOUS at 22:01

## 2021-10-04 RX ADMIN — Medication 10 ML: at 22:01

## 2021-10-04 RX ADMIN — ATORVASTATIN CALCIUM 40 MG: 40 TABLET, FILM COATED ORAL at 22:01

## 2021-10-04 RX ADMIN — SODIUM CHLORIDE: 9 INJECTION, SOLUTION INTRAVENOUS at 13:06

## 2021-10-04 RX ADMIN — FUROSEMIDE 80 MG: 40 TABLET ORAL at 22:00

## 2021-10-04 RX ADMIN — ASPIRIN 81 MG: 81 TABLET, COATED ORAL at 22:01

## 2021-10-04 RX ADMIN — SPIRONOLACTONE 50 MG: 25 TABLET ORAL at 22:02

## 2021-10-04 RX ADMIN — HEPARIN SODIUM 5000 UNITS: 5000 INJECTION INTRAVENOUS; SUBCUTANEOUS at 22:55

## 2021-10-04 RX ADMIN — PREGABALIN 75 MG: 75 CAPSULE ORAL at 22:01

## 2021-10-04 RX ADMIN — AMLODIPINE BESYLATE 10 MG: 5 TABLET ORAL at 22:00

## 2021-10-04 ASSESSMENT — PAIN SCALES - GENERAL
PAINLEVEL_OUTOF10: 0

## 2021-10-04 ASSESSMENT — ENCOUNTER SYMPTOMS
SORE THROAT: 0
RECTAL PAIN: 0
BLOOD IN STOOL: 0
DIARRHEA: 0
ABDOMINAL PAIN: 0
VOMITING: 0
NAUSEA: 0
SHORTNESS OF BREATH: 0
CHEST TIGHTNESS: 0
WHEEZING: 0

## 2021-10-04 NOTE — ED NOTES
Pharmacy Medication History Note      List of current medications patient is taking is complete. Source of information: Patient    Changes made to medication list:  Medications flagged for removal (include reason, ex. noncompliance):  none    Medications removed (include reason, ex. therapy complete or physician discontinued):  Sertraline- therapy completed  Carvedilol- therapy completed  Lasix- dose adjustment    Medications added/doses adjusted:  Furosemide 80 mg daily  Lantus 20 units AM    Other notes (ex. Recent course of antibiotics, Coumadin dosing):  Denies use of other OTC or herbal medications. Last dose times updated. Dudley Sharma, PharmD  ED Pharmacist [id number]  10/4/2021    No current facility-administered medications on file prior to encounter. Current Outpatient Medications on File Prior to Encounter   Medication Sig Dispense Refill    furosemide (LASIX) 80 MG tablet Take 80 mg by mouth daily      insulin glargine (LANTUS SOLOSTAR) 100 UNIT/ML injection pen Inject 20 Units into the skin every morning       lisinopril (PRINIVIL;ZESTRIL) 40 MG tablet Take 40 mg by mouth daily      clonazePAM (KLONOPIN) 0.5 MG tablet Take 0.5 mg by mouth 2 times daily as needed.        glycopyrrolate-formoterol (BEVESPI AEROSPHERE) 9-4.8 MCG/ACT AERO Inhale 2 puffs into the lungs 2 times daily 10.7 g 2    cloNIDine (CATAPRES) 0.2 MG/24HR PTWK Place 1 patch onto the skin once a week 4 patch 2    glucose (GLUTOSE) 40 % GEL Take 37.5 mLs by mouth as needed (low blood sugar) 45 g 1    FLUoxetine (PROZAC) 20 MG capsule Take 1 capsule by mouth daily 30 capsule 3    Dulaglutide 0.75 MG/0.5ML SOPN Inject 0.75 mg into the skin once a week 4 pen 1    amLODIPine (NORVASC) 10 MG tablet Take 1 tablet by mouth daily 30 tablet 1    spironolactone (ALDACTONE) 50 MG tablet Take 1 tablet by mouth daily 30 tablet 2    atorvastatin (LIPITOR) 40 MG tablet Take 1 tablet by mouth nightly 30 tablet 3    propranolol (INDERAL LA) 80 MG extended release capsule Take 1 capsule by mouth every morning 30 capsule 2    aspirin 81 MG EC tablet Take 1 tablet by mouth daily 30 tablet 3    pregabalin (LYRICA) 75 MG capsule Take 1 capsule by mouth nightly for 7 days. 7 capsule 0    insulin lispro, 1 Unit Dial, 100 UNIT/ML SOPN Inject 0-6 Units into the skin 3 times daily (with meals) **Corrective Low Dose Algorithm**  Glucose: Dose:               No Insulin  140-199 1 Unit  200-249 2 Units  250-299 3 Units  300-349 4 Units  350-399 5 Units  Over 399 6 Units 3 pen 0    [DISCONTINUED] sertraline (ZOLOFT) 100 MG tablet Take 100 mg by mouth daily      [DISCONTINUED] carvedilol (COREG) 6.25 MG tablet Take 6.25 mg by mouth 2 times daily (with meals)      [DISCONTINUED] furosemide (LASIX) 40 MG tablet Take 1 tablet by mouth daily 60 tablet 3    Insulin Pen Needle 29G X 12.7MM MISC 1 each by Does not apply route daily 100 each 5    [DISCONTINUED] glucose monitoring kit (FREESTYLE) monitoring kit 1 kit by Does not apply route daily 1 kit 0    [DISCONTINUED] glucose blood VI test strips (VICTORY AGM-4000 TEST) strip Test four times daily until controlled and then three times daily.   Code 250.02  ONE TOUCH ULTRA MONITOR 100 strip 12

## 2021-10-04 NOTE — ED PROVIDER NOTES
[address]        Pt Name: Christie Tucker  MRN: [id number]  [place] 1975  Date of evaluation: 10/4/2021  Provider: RIVERA Chavis - FIDELINA  PCP: Dionicio Salter  Note Started: 11:58 AM EDT       KANDY. I have evaluated this patient. My supervising physician was available for consultation. CHIEF COMPLAINT       Abnormal labs    HISTORY OF PRESENT ILLNESS   (Location, Timing/Onset, Context/Setting, Quality, Duration, Modifying Factors, Severity, Associated Signs and Symptoms)  Note limiting factors. Christie Tucker is a 55 y.o. female who presents To the emergency department today at the direction of her nephrologist due to a low hemoglobin. Patient with history of end-stage renal disease on hemodialysis. She states that her last dialysis was Friday. She did have her full treatment at that time. Patient states that she received a phone call this morning stating that her hemoglobin was only 5.4. Patient states that she was admitted to this hospital in late August after having a CVA and states that she did have to have a blood transfusion during her stay. Patient denies hematemesis, hematochezia, or melena. Patient states that she is unsure where this blood loss is coming from. Patient states that she feels \"fine\". She adamantly denies having any chest pain. She denies shortness of breath. She states that she feels weak but states that this is her baseline. She denies recent travel or known sick contacts. She has not been vaccinated against Covid. She denies fever, chills, or other symptoms. Nursing Notes were all reviewed and agreed with or any disagreements were addressed in the HPI. REVIEW OF SYSTEMS    (2-9 systems for level 4, 10 or more for level 5)     Review of Systems   Constitutional: Positive for fatigue. Negative for chills and fever. HENT: Negative for congestion and sore throat.     Eyes: Negative for visual disturbance. Respiratory: Negative for chest tightness, shortness of breath and wheezing. Cardiovascular: Negative for chest pain and palpitations. Gastrointestinal: Negative for abdominal pain, blood in stool, diarrhea, nausea, rectal pain and vomiting. Endocrine: Negative for polydipsia and polyuria. Genitourinary: Negative for difficulty urinating and dysuria. Musculoskeletal: Negative for neck pain and neck stiffness. Skin: Negative for rash and wound. Neurological: Positive for weakness. Negative for dizziness, light-headedness and headaches. Hematological: Does not bruise/bleed easily. Psychiatric/Behavioral: Negative for suicidal ideas. Positives and Pertinent negatives as per HPI. Except as noted above in the ROS, all other systems were reviewed and negative. PAST MEDICAL HISTORY     Past Medical History:   Diagnosis Date    Blind in both eyes     Cerebral artery occlusion with cerebral infarction ([address]     CHF (congestive heart failure) (HCC)     Chronic kidney disease     Depression     Diabetes mellitus out of control ([address]     Diabetes mellitus, type II ([address]     2005    Diabetic neuropathy associated with type 2 diabetes mellitus ([address]     Generalized headaches     Hypertension     Infertility     Insomnia     chronic vs lack of time spent to sleep    Migraine headache 11/09/2011    Mixed hyperlipidemia     Otitis media     h/o recurrent    Pelvic abscess in female 10/05/2013    Pneumonia     2004 approx.     Stroke ([address] 08/27/2020    Stroke ([address] 08/27/2021         SURGICAL HISTORY     Past Surgical History:   Procedure Laterality Date    CERVIX SURGERY      laser tx for dysplasia;1992    EYE SURGERY      FOOT SURGERY Right     FOOT SURGERY Bilateral     FOOT SURGERY Left     IR TUNNELED CATHETER PLACEMENT GREATER THAN 5 YEARS  9/7/2021    IR TUNNELED CATHETER PLACEMENT GREATER THAN 5 YEARS 9/7/2021 Marla Garduno MD [facility] SPECIAL PROCEDURES tablet by mouth daily  Qty: 30 tablet, Refills: 3      pregabalin (LYRICA) 75 MG capsule Take 1 capsule by mouth nightly for 7 days. Qty: 7 capsule, Refills: 0    Associated Diagnoses: Other diabetic neurological complication associated with type 2 diabetes mellitus (HCC)      insulin lispro, 1 Unit Dial, 100 UNIT/ML SOPN Inject 0-6 Units into the skin 3 times daily (with meals) **Corrective Low Dose Algorithm**  Glucose: Dose:               No Insulin  140-199 1 Unit  200-249 2 Units  250-299 3 Units  300-349 4 Units  350-399 5 Units  Over 399 6 Units  Qty: 3 pen, Refills: 0      Insulin Pen Needle 29G X 12.7MM MISC 1 each by Does not apply route daily  Qty: 100 each, Refills: 5    Associated Diagnoses: Type 2 diabetes mellitus with other specified complication, with long-term current use of insulin (HCC)               ALLERGIES     Amoxicillin, Levofloxacin, Vancomycin, and Tape [adhesive tape]    FAMILYHISTORY       Family History   Problem Relation Age of Onset    Diabetes Mother    Olinda Lobe Other Mother 79        Covid    Diabetes Father     High Blood Pressure Father     Colon Cancer Father     Diabetes Sister     Alcohol Abuse Maternal Grandfather     Diabetes Paternal Grandmother     Alcohol Abuse Paternal Grandfather     Diabetes Paternal Aunt     Diabetes Paternal Uncle           SOCIAL HISTORY       Social History     Tobacco Use    Smoking status: Current Every Day Smoker     Packs/day: 1.00     Types: Cigarettes    Smokeless tobacco: Never Used   Vaping Use    Vaping Use: Never used   Substance Use Topics    Alcohol use: No     Comment: Very Rare    Drug use: No       SCREENINGS             PHYSICAL EXAM    (up to 7 for level 4, 8 or more for level 5)     ED Triage Vitals   BP Temp Temp src Pulse Resp SpO2 Height Weight   -- -- -- -- -- -- -- --       Physical Exam  Vitals and nursing note reviewed. Constitutional:       General: She is not in acute distress.      Appearance: Normal appearance. She is ill-appearing. She is not toxic-appearing or diaphoretic. Comments: Appears chronically ill, pale   HENT:      Head: Normocephalic and atraumatic. Right Ear: External ear normal.      Left Ear: External ear normal.      Nose: Nose normal.      Mouth/Throat:      Mouth: Mucous membranes are dry. Eyes:      General:         Right eye: No discharge. Left eye: No discharge. Extraocular Movements: Extraocular movements intact. Cardiovascular:      Rate and Rhythm: Normal rate and regular rhythm. Pulses: Normal pulses. Heart sounds: Normal heart sounds. Pulmonary:      Effort: Pulmonary effort is normal. No respiratory distress. Breath sounds: Normal breath sounds. Abdominal:      General: Bowel sounds are normal.      Palpations: Abdomen is soft. Tenderness: There is no abdominal tenderness. Musculoskeletal:         General: Normal range of motion. Cervical back: Normal range of motion and neck supple. Right lower leg: No edema. Left lower leg: No edema. Skin:     General: Skin is warm and dry. Capillary Refill: Capillary refill takes less than 2 seconds. Neurological:      General: No focal deficit present. Mental Status: She is alert and oriented to person, place, and time. Psychiatric:         Mood and Affect: Mood normal.         Behavior: Behavior normal.         Thought Content:  Thought content normal.         Judgment: Judgment normal.         DIAGNOSTIC RESULTS   LABS:    Labs Reviewed   CBC WITH AUTO DIFFERENTIAL - Abnormal; Notable for the following components:       Result Value    RBC 2.31 (*)     Hemoglobin 6.6 (*)     Hematocrit 20.3 (*)     RDW 16.9 (*)     All other components within normal limits    Narrative:     Sheron Land  [facility] tel. [phone],  Hematology results called to and read back by marian almanza,  10/04/2021 12:43, by Clarissa Mantilla  Performed at:  [facility]  [address] [facility] [place]   Phone [phone]   COMPREHENSIVE METABOLIC PANEL W/ REFLEX TO MG FOR LOW K - Abnormal; Notable for the following components:    Potassium reflex Magnesium 5.3 (*)     Glucose 104 (*)     BUN 50 (*)     CREATININE 5.0 (*)     GFR Non- 9 (*)     GFR  11 (*)     Total Protein 6.3 (*)     Albumin 3.3 (*)     All other components within normal limits    Narrative:     Performed at:  [facility] [address], [place], [facility] [place]   Phone [phone]   CULTURE, BLOOD 1   CULTURE, BLOOD 2   BLOOD OCCULT STOOL DIAGNOSTIC    Narrative:     ORDER#: [id number]                          ORDERED BY: Luis Mg  SOURCE: Stool                              COLLECTED:  10/04/21 15:37  ANTIBIOTICS AT DANE.:                      RECEIVED :  10/04/21 16:01  Performed at:  [facility] [address], [place], [facility] [place]   Phone [phone]    Narrative:     Lurdes Medina  [facility] tel. [phone],  Rejected Testcmpe/Called to:marian Lombardo, 10/04/2021 12:57, by Merobdulioyn Baudilio  Performed at:  [facility] [address], [place], [facility] [place]   Phone [phone]   RENAL FUNCTION PANEL   COMPREHENSIVE METABOLIC PANEL W/ REFLEX TO MG FOR LOW K   CBC WITH AUTO DIFFERENTIAL   C-REACTIVE PROTEIN   LACTIC ACID, PLASMA   TYPE AND SCREEN    Narrative:     Lurdes Medina  [facility] tel. [phone],  Rejected Testcmpe/Called to:marian Lombardo, 10/04/2021 12:57, by Merwyn Baudilio  Performed at:  [facility] [address], [place], [facility] [place]   Phone [phone]   PREPARE RBC (CROSSMATCH)    Narrative:     Lurdes Medina  [facility] tel. [phone],  Rejected Testcmpe/Called to:rn Alveria Greenhouse green, 10/04/2021 12:57, by Zeina Mosley RBC (CROSSMATCH)       When ordered only abnormal lab results are displayed.  All other labs were within normal range or not returned as of this dictation. EKG: When ordered, EKG's are interpreted by the Emergency Department Physician in the absence of a cardiologist.  Please see their note for interpretation of EKG. RADIOLOGY:   Non-plain film images such as CT, Ultrasound and MRI are read by the radiologist. Plain radiographic images are visualized and preliminarily interpreted by the ED Provider with the below findings:        Interpretation per the Radiologist below, if available at the time of this note:    XR CHEST PORTABLE   Preliminary Result   Extensive multifocal airspace disease in both lungs worsening since prior   examination suggesting worsening pneumonia. No results found. PROCEDURES   Unless otherwise noted below, none     Procedures    CRITICAL CARE TIME   The total critical care time spent while evaluating and treating this patient was at least 32 minutes. This excludes time spent doing separately billable procedures. This includes time at the bedside, data interpretation, medication management, obtaining critical history from collateral sources if the patient is unable to provide it directly, and physician consultation. Specifics of interventions taken and potentially life-threatening diagnostic considerations are listed above in the medical decision making.         CONSULTS:  IP CONSULT TO NEPHROLOGY  IP CONSULT TO INTERNAL MEDICINE  IP CONSULT TO NEPHROLOGY      EMERGENCY DEPARTMENT COURSE and DIFFERENTIAL DIAGNOSIS/MDM:   Vitals:    Vitals:    10/04/21 1630 10/04/21 1658 10/04/21 1734 10/04/21 1800   BP: (!) 148/74  (!) 159/77 139/65   Pulse:   83 82   Resp: 9  20 18   Temp:  97.9 °F (36.6 °C) 95.7 °F (35.4 °C) 97.7 °F (36.5 °C)   TempSrc:    Oral   SpO2: (!) 89%  96% 96%   Weight:   202 lb 2.6 oz (91.7 kg)    Height:   5' 7\" (1.702 m)        Patient was given the following medications:  Medications   glucose monitoring (FREESTYLE) kit 1 kit (has no administration in time range)   aspirin EC tablet 81 mg (has no administration in time range)   pregabalin (LYRICA) capsule 75 mg (has no administration in time range)   amLODIPine (NORVASC) tablet 10 mg (has no administration in time range)   spironolactone (ALDACTONE) tablet 50 mg (has no administration in time range)   atorvastatin (LIPITOR) tablet 40 mg (has no administration in time range)   Insulin Pen Needle MISC 1 each (has no administration in time range)   propranolol (INDERAL LA) extended release capsule 80 mg (has no administration in time range)   cloNIDine (CATAPRES) 0.2 MG/24HR 1 patch (has no administration in time range)   glucose (GLUTOSE) 40 % oral gel 15 g (has no administration in time range)   FLUoxetine (PROZAC) capsule 20 mg (has no administration in time range)   furosemide (LASIX) tablet 80 mg (has no administration in time range)   clonazePAM (KLONOPIN) tablet 0.5 mg (has no administration in time range)   tiotropium-olodaterol (STIOLTO) 2.5-2.5 MCG/ACT inhaler 2 puff (has no administration in time range)   sodium chloride flush 0.9 % injection 10 mL (has no administration in time range)   sodium chloride flush 0.9 % injection 10 mL (has no administration in time range)   0.9 % sodium chloride infusion (has no administration in time range)   enoxaparin (LOVENOX) injection 30 mg (has no administration in time range)   ondansetron (ZOFRAN-ODT) disintegrating tablet 4 mg (has no administration in time range)     Or   ondansetron (ZOFRAN) injection 4 mg (has no administration in time range)   acetaminophen (TYLENOL) tablet 650 mg (has no administration in time range)     Or   acetaminophen (TYLENOL) suppository 650 mg (has no administration in time range)   hydrALAZINE (APRESOLINE) injection 10 mg (has no administration in time range)   0.9 % sodium chloride bolus (has no administration in time range)   potassium chloride (KLOR-CON M) extended release tablet 40 mEq (has no administration in time range)     Or potassium bicarb-citric acid (EFFER-K) effervescent tablet 40 mEq (has no administration in time range)     Or   potassium chloride 10 mEq/100 mL IVPB (Peripheral Line) (has no administration in time range)   dexamethasone (PF) (DECADRON) injection 6 mg (has no administration in time range)   0.9 % sodium chloride infusion (has no administration in time range)   heparin (porcine) injection 3,600 Units (has no administration in time range)   0.9 % sodium chloride infusion ( IntraVENous New Bag 10/4/21 1306)     ED Course as of Oct 04 1847   Mon Oct 04, 2021   1438 Spoke with Dr. Joanna Dover who agrees to admit patient for further evaluation and treamtment    [TV]      ED Course User Index  [TV] RIVERA Flores CNP      Previous records reviewed in order to gain further information regarding patient's PMH as well as her HPI. Nursing notes reviewed. This is a 70-year-old  female with history of multiple comorbid's including diabetes, end-stage renal disease on hemodialysis, CHF, and recent CVA. She states that she was supposed to go to dialysis today but states that she was advised to come to the emergency department because her hemoglobin was 5.4. Physical exam complete. Patient is nontoxic, afebrile, normotensive. She is mildly tachypneic but not hypoxic. An EKG was interpreted by ED physician reviewed myself and is negative for acute ST elevation. Laboratory studies notable for an H&H of 6.6 and 20.3 respectively. Creatinine is 5. Potassium is 5.3. Remaining laboratory studies have been unremarkable or consistent with previous. 1 unit of packed red blood cells ordered to be transfused in the ED. Dr. Yary Beard, nephrology consulted and will place patient on dialysis schedule. Patient reassessed. Patient's oxygen saturation noted to be in 80s on room air. Patient placed on supplemental oxygen. She is noted to have conversational dyspnea at this point with mild tachypnea.  Patient will be

## 2021-10-04 NOTE — CONSULTS
MD Aurelia Juarez MD Gregory Nip, MD                Office: [phone]                      Fax: [phone]          NEPHROLOGY INITIAL CONSULT NOTE:     PATIENT NAME: Alejo Pinto  : 1975  MRN: [id number]  REASON FOR CONSULT: I am asked to see this patient in consultation for my opinion regarding management of ESRD. My recommendations will be communicated by way of shared medical record. IMPRESSION / RECOMMENDATION:      Admitted for:  No admission diagnoses are documented for this encounter. 1. ESRD on iHD MWF: .   - outpt HD center: [facility], Dr Mar Kern   - recently started HD in 2021, during admission w/ ATN w/ KINZA on CKD-4, was lost for f/u,), had acute hypoxic respiratory failure, pneumonia - needing intubation  - Access: Rt IJ TDC  - next HD today      2. Hypertension/Volume Status:  - BP HTN - ok controlled - f/u w/ HD : resume home BP meds  - EDW - f/u outpt records -> 213 lbs on admission   - Na controlled     3. Electrolytes/acid-base:  K: Hyperkalemia - mild - f/u w/ HD   High AG metabolic acidosis: controlled    4. Anemia of renal failure: acute on chronic goal, 6.6 on admission w/ SOB   - F/UP FOBT, GI consul  - check [employer]  - needs PRBC, ok to give during dialysis,   - RIA: w/ HD, once [employer] adequate     5. BMD:  - Phos,- follow iPTH outpt      History of uncontrolled diabetes last A1c was 11.3   Noncompliance in past   Morbid obesity: BMI low 30s      : Other supportive care :   - Check daily renal function panel with electrolytes-phosphorus  - Strict monitoring of I/Os, daily weight  - Renal feeds/diet  - Current medications reviewed. - Nephrotoxic medications have been discontinued. - Dose adjusted and appropriate.       - Dose meds for eGFR <15 mL/min/1.73m2.    - Avoid heavy opioids due to renal failure - may use very low dose dilaudid / fentanyl with close monitoring of CNS and respiratory depression. Other Problems:      Please refer to orders. Multiple complex problems. High risk  Discussed with patient, and treatment team    Thank you for allowing me to participate in this patient's care. Please do not hesitate to contact me with any questions/concerns. We will follow along with you. Autumn Partida MD  [facility] [id number] [place] [address] [place]: [phone] or [address] [facility]  Fax: [phone]    Time spent  ~ 35 minutes that included face-to-face meeting/discussion with patient's , treatment team (including primary/referring team and other consultants; included coordination of care with the treatment team; and review of patient's electronic medical records and ordering appropriates tests.       ===========================================  ===========================================      CHIEF COMPLAINT:  \"low hemoglobin\"   History Obtained From:  patient + treatment team + Electronic Medical Records. HPI: Ms. Jose Mast is a 55 y.o. female with significant past medical history of End stage renal disease, and   Past Medical History:   Diagnosis Date    Blind in both eyes     Cerebral artery occlusion with cerebral infarction ([address]     CHF (congestive heart failure) ([address]     Chronic kidney disease     Depression     Diabetes mellitus out of control ([address]     Diabetes mellitus, type II ([address]     2005    Diabetic neuropathy associated with type 2 diabetes mellitus ([address]     Generalized headaches     Hypertension     Infertility     Insomnia     chronic vs lack of time spent to sleep    Migraine headache 11/09/2011    Mixed hyperlipidemia     Otitis media     h/o recurrent    Pelvic abscess in female 10/05/2013    Pneumonia     2004 approx.  Stroke [facility]) 08/27/2020    Stroke ([facility] 08/27/2021    ,   presents with No chief complaint on file. Admitted with No admission diagnoses are documented for this encounter. We are called for ESRD care. Pt was told by dialysis team, Hb was ~5, she has SOB, worsening, so came in to ER. Had SOB / leg edema. No current active complaints. Patient denied chest pain / dizziness/lightheadedness/syncope/   Date of last dialysis: last Friday, due for HD today   Re: ESRD  · Duration (when): Chronic   · Location (where): kidneys  · Severity (ex: CKD stage): End stage  · Timing (ex: continuous, intermittent): intermittent HD  · Context (ex: related to condition): presumed DM / HTN related. · Modifying factors (ex: medications, interventions): dialysis support  · Associated signs & symptoms (ex: edema, SOB): Refer to HPI and Chief complaint    Past medical, surgical, social and family medial history reviewed by me:   PAST MEDICAL HISTORY:   Past Medical History:   Diagnosis Date    Blind in both eyes     Cerebral artery occlusion with cerebral infarction ([address]     CHF (congestive heart failure) ([address]     Chronic kidney disease     Depression     Diabetes mellitus out of control ([address]     Diabetes mellitus, type II ([address]     2005    Diabetic neuropathy associated with type 2 diabetes mellitus ([address]     Generalized headaches     Hypertension     Infertility     Insomnia     chronic vs lack of time spent to sleep    Migraine headache 11/09/2011    Mixed hyperlipidemia     Otitis media     h/o recurrent    Pelvic abscess in female 10/05/2013    Pneumonia     2004 approx.     Stroke ([address] 08/27/2020    Stroke ([address] 08/27/2021     PAST SURGICAL HISTORY:   Past Surgical History:   Procedure Laterality Date    CERVIX SURGERY      laser tx for dysplasia;1992    EYE SURGERY      FOOT SURGERY Right     FOOT SURGERY Bilateral     FOOT SURGERY Left     IR TUNNELED CATHETER PLACEMENT GREATER THAN 5 YEARS  9/7/2021    IR TUNNELED CATHETER PLACEMENT GREATER THAN 5 YEARS 9/7/2021 Adria Polk MD [facility] SPECIAL PROCEDURES     FAMILY HISTORY:   Family History   Problem Relation Age of Onset    Diabetes Mother    [facility] Other Mother 79        Covid    Diabetes Father     High Blood Pressure Father     Colon Cancer Father     Diabetes Sister     Alcohol Abuse Maternal Grandfather     Diabetes Paternal Grandmother     Alcohol Abuse Paternal Grandfather     Diabetes Paternal Aunt     Diabetes Paternal Uncle      SOCIAL HISTORY:   Social History     Socioeconomic History    Marital status:      Spouse name: Rich Ladd Number of children: 0    Years of education: Not on file    Highest education level: Not on file   Occupational History    Occupation: works as    Tobacco Use    Smoking status: Current Every Day Smoker     Packs/day: 1.00     Types: Cigarettes    Smokeless tobacco: Never Used   Vaping Use    Vaping Use: Never used   Substance and Sexual Activity    Alcohol use: No     Comment: Very Rare    Drug use: No    Sexual activity: Yes     Partners: Male   Other Topics Concern    Not on file   Social History Narrative    Not on file     Social Determinants of Health     Financial Resource Strain:     Difficulty of Paying Living Expenses:    Food Insecurity:     Worried About Running Out of Food in the Last Year:     Ran Out of Food in the Last Year:    Transportation Needs:     Lack of Transportation (Medical):      Lack of Transportation (Non-Medical):    Physical Activity:     Days of Exercise per Week:     Minutes of Exercise per Session:    Stress:     Feeling of Stress :    Social Connections:     Frequency of Communication with Friends and Family:     Frequency of Social Gatherings with Friends and Family:     Attends [employer] Services:     Active Member of Clubs or Organizations:     Attends Club or Organization Meetings:     Marital Status:    Intimate Partner Violence:     Fear of Current or Ex-Partner:     Emotionally Abused:     Physically Abused:     Sexually Abused:           MEDICATIONS reviewed by me:  Prior to Admission Medications:  No current facility-administered medications on file prior to encounter. Current Outpatient Medications on File Prior to Encounter   Medication Sig Dispense Refill    carvedilol (COREG) 6.25 MG tablet Take 6.25 mg by mouth 2 times daily (with meals)      clonazePAM (KLONOPIN) 0.5 MG tablet       glycopyrrolate-formoterol (BEVESPI AEROSPHERE) 9-4.8 MCG/ACT AERO Inhale 2 puffs into the lungs 2 times daily 10.7 g 2    cloNIDine (CATAPRES) 0.2 MG/24HR PTWK Place 1 patch onto the skin once a week 4 patch 2    glucose (GLUTOSE) 40 % GEL Take 37.5 mLs by mouth as needed (low blood sugar) 45 g 1    FLUoxetine (PROZAC) 20 MG capsule Take 1 capsule by mouth daily 30 capsule 3    furosemide (LASIX) 40 MG tablet Take 1 tablet by mouth daily 60 tablet 3    Dulaglutide 0.75 MG/0.5ML SOPN Inject 0.75 mg into the skin once a week 4 pen 1    amLODIPine (NORVASC) 10 MG tablet Take 1 tablet by mouth daily 30 tablet 1    spironolactone (ALDACTONE) 50 MG tablet Take 1 tablet by mouth daily 30 tablet 2    atorvastatin (LIPITOR) 40 MG tablet Take 1 tablet by mouth nightly 30 tablet 3    Insulin Pen Needle 29G X 12.7MM MISC 1 each by Does not apply route daily 100 each 5    propranolol (INDERAL LA) 80 MG extended release capsule Take 1 capsule by mouth every morning 30 capsule 2    aspirin 81 MG EC tablet Take 1 tablet by mouth daily 30 tablet 3    pregabalin (LYRICA) 75 MG capsule Take 1 capsule by mouth nightly for 7 days.  7 capsule 0    glucose monitoring kit (FREESTYLE) monitoring kit 1 kit by Does not apply route daily 1 kit 0    insulin lispro, 1 Unit Dial, 100 UNIT/ML SOPN Inject 0-6 Units into the skin 3 times daily (with meals) **Corrective Low Dose Algorithm**  Glucose: Dose:               No Insulin  140-199 1 Unit  200-249 2 Units  250-299 3 Units  300-349 4 Units  350-399 5 Units  Over 399 6 Units (Patient taking differently: Inject 6-12 Units into the skin 3 times daily (with meals) **Corrective Low Dose Algorithm**  Glucose: Dose:               No Insulin  140-199 1 Unit  200-249 2 Units  250-299 3 Units  300-349 4 Units  350-399 5 Units  Over 399 6 Units) 3 pen 0    glucose blood VI test strips (VICTORY AGM-4000 TEST) strip Test four times daily until controlled and then three times daily. Code 250.02  ONE TOUCH ULTRA MONITOR 100 strip 12       Not in a hospital admission. Inpatient Medications:  Scheduled Meds:  Continuous Infusions:    PRN Meds:. Allergies: Amoxicillin, Levofloxacin, Vancomycin, and Tape [adhesive tape]    REVIEW OF SYSTEMS:  As mentioned in HPI and CC  All other 10-point review of systems: negative. PHYSICAL EXAM:  Patient Vitals for the past 24 hrs:   BP Temp Temp src Pulse Resp SpO2 Height Weight   10/04/21 1312 -- 97.6 °F (36.4 °C) Oral -- -- -- -- --   10/04/21 1307 -- -- -- -- -- -- 5' 7\" (1.702 m) 213 lb (96.6 kg)   10/04/21 1245 -- -- -- 85 -- 93 % -- --   10/04/21 1215 -- -- -- 84 14 90 % -- --   10/04/21 1200 (!) 140/72 -- -- -- -- 91 % -- --     No intake or output data in the 24 hours ending 10/04/21 1424    Physical Exam  Vitals reviewed. Constitutional:       General: She is not in acute distress. Appearance: She is ill-appearing. Comments: Obese body habitus   HENT:      Head: Normocephalic and atraumatic. Right Ear: External ear normal.      Left Ear: External ear normal.      Mouth/Throat:      Mouth: Mucous membranes are not dry. Eyes:      General: No scleral icterus. Conjunctiva/sclera: Conjunctivae normal.   Neck:      Thyroid: No thyroid mass. Vascular: No JVD. Trachea: Trachea normal.   Cardiovascular:      Rate and Rhythm: Normal rate and regular rhythm. Pulmonary:      Effort: Respiratory distress (mild) present. Breath sounds: Rales present. Abdominal:      General: Bowel sounds are normal.      Palpations: Abdomen is soft. Musculoskeletal:         General: Swelling present. No deformity.       Cervical back: Neck supple. Skin:     General: Skin is warm and dry. Neurological:      Mental Status: She is alert and oriented to person, place, and time. Mental status is at baseline. Psychiatric:         Mood and Affect: Mood normal.         Behavior: Behavior normal.       Vascular Access:   Hemodialysis catheter location:  RIGHT tunneled internal jugular . Exit site demonstrates:  normal findings; no signs of bleeding, redness, tenderness or discharge. DATA:  Diagnostic tests reviewed for today's visit:    Recent Labs     10/04/21  1214   WBC 9.7   HCT 20.3*        Iron Saturation:  No components found for: PERCENTFE  FERRITIN:    Lab Results   Component Value Date    FERRITIN 112.0 08/28/2021     IRON:    Lab Results   Component Value Date    IRON 22 08/28/2021     TIBC:    Lab Results   Component Value Date    TIBC 184 08/28/2021       Recent Labs     10/04/21  1309      K 5.3*      CO2 21   BUN 50*   CREATININE 5.0*     Recent Labs     10/04/21  1309   CALCIUM 8.4     No results for input(s): PH, PCO2, PO2 in the last 72 hours. Invalid input(s): Willodean Cici    ABG:  No results found for: PH, PCO2, PO2, HCO3, BE, THGB, TCO2, O2SAT  VBG:    Lab Results   Component Value Date    PHVEN 7.322 09/13/2021    MEA0JLW 34.3 02/23/2021    BEVEN -4.6 02/23/2021    V9VSDTXZ 100 02/23/2021           BELOW MENTIONED RADIOLOGY STUDY RESULTS REVIEWED BY ME:    IR TUNNELED CVC PLACE WO SQ PORT/PUMP > 5 YEARS    Result Date: 9/7/2021  PROCEDURE: FLUOROSCOPY GUIDED PLACEMENT OF A TUNNELED CATHETER. MODERATE CONSCIOUS SEDATION 9/7/2021. HISTORY: ORDERING SYSTEM PROVIDED HISTORY: Tunneled HD catheter TECHNOLOGIST PROVIDED HISTORY: Reason for exam:->Tunneled HD catheter How many lumens are being requested?->2 What side should this line be placed? ->Either What site is the preferred site? ->No preference Is the patient pregnant?->No SEDATION: Intravenous sedation was administered with continuous monitoring throughout the procedure. In measured doses, a total of intravenous , 0.5 mg intravenous Versed and 25 mcg intravenous fentanyl was administered. My total procedure time with moderate sedation was 15 minutes. ESTIMATED BLOOD LOSS: None. FLUOROSCOPY DOSE AND TYPE OR TIME AND EXPOSURES: 0.1 minute, 1 digital image. TECHNIQUE: Informed consent was obtained after a detailed explanation of the procedure including risks, benefits, and alternatives. Universal protocol was observed. The right neck and chest were prepped and draped in sterile fashion using maximum sterile barrier technique. Local anesthesia was achieved with lidocaine. The existing temporary catheter was utilized with wire placement to obtain length of the new tunneled catheter. A 0.035 guidewire was used to place a peel-away sheath. A chest wall incision was made and the catheter tubing was attached and tunneled subcutaneously to the prior venotomy site. The tunneled dialysis catheter tubing was 23 cm and advanced through the peel-away sheath under fluoroscopic guidance to the mid right atrium. The external catheter was sewn to the skin surface with 2 separate sutures, 2-0 silk. Both ports aspirated with easy blood return and were flushed easily with heparinized saline. The ports were locked with heparinized saline. The patient tolerated the procedure well and there were no immediate complications. Immediate digital image reveals accurate positioning of the catheter tip in the mid right atrium. FINDINGS: The tunneled catheter has a normal expected course and the catheter tip placement is in the mid right atrium. Successful fluoroscopy guided right IJ placement of the tunneled dialysis catheter. The catheter is available for immediate utilization.

## 2021-10-04 NOTE — ED NOTES
Blood infusing without difficulty. No signs of reaction noted at this time. Will monitor.      Abigail Ponce RN  10/04/21 8036

## 2021-10-04 NOTE — H&P
History and Physical  Dr. Tejeda Inch  10/4/2021    PCP: Ronnie Alfred    Cc: No chief complaint on file. HPI:  Ayse Chand is a 55 y.o. female who has a past medical history of Blind in both eyes, Cerebral artery occlusion with cerebral infarction ([address] CHF (congestive heart failure) ([address] Chronic kidney disease, Depression, Diabetes mellitus out of control ([address] Diabetes mellitus, type II ([address] Diabetic neuropathy associated with type 2 diabetes mellitus ([address] Generalized headaches, Hypertension, Infertility, Insomnia, Migraine headache, Mixed hyperlipidemia, Otitis media, Pelvic abscess in female, Pneumonia, Stroke ([address] and Stroke ([address] Patient presents with Anemia. HPI     55 y.o. female who presents To the emergency department today at the direction of her nephrologist due to a low hemoglobin. Patient with history of end-stage renal disease on hemodialysis. She states that her last dialysis was Friday. She did have her full treatment at that time. Patient states that she received a phone call this morning stating that her hemoglobin was only 5.4. Patient states that she was admitted to this hospital in late August after having a CVA and states that she did have to have a blood transfusion during her stay. Patient denies hematemesis, hematochezia, or melena. Patient states that she is unsure where this blood loss is coming from. Patient states that she feels \"fine\". She adamantly denies having any chest pain. She denies shortness of breath. She states that she feels weak but states that this is her baseline. She denies recent travel or known sick contacts. She has not been vaccinated against Covid. Her hgb is extremely low  Transfusion has been ordered in ER by ER staff  Pt seen by renal, for dialysis today    Problem list of hospitalization thus far:   Active Hospital Problems    Diagnosis     ESRD (end stage renal disease) ([address] [N18.6]     HTN (hypertension), benign [I10]  Anemia [D64.9]     Type 2 diabetes mellitus, with long-term current use of insulin (HCC) [E11.9, Z79.4]     Mixed hyperlipidemia [E78.2]          Review of Systems: (1 system for EPF, 2-9 for detailed, 10+ for comprehensive)  Review of Systems   Constitutional: Negative for chills and fever. HENT: Negative for ear discharge and ear pain. Eyes: Negative for pain and redness. Respiratory: Negative for chest tightness and shortness of breath. Cardiovascular: Negative for chest pain and leg swelling. Gastrointestinal: Negative for constipation and diarrhea. Endocrine: Negative for polydipsia and polyphagia. Genitourinary: Negative for flank pain and frequency. Musculoskeletal: Negative for joint swelling and neck pain. Allergic/Immunologic: Negative for food allergies. Neurological: Positive for weakness. Negative for dizziness. Hematological: Negative for adenopathy. Psychiatric/Behavioral: Negative for agitation and decreased concentration. Past Medical History:   Past Medical History:   Diagnosis Date    Blind in both eyes     Cerebral artery occlusion with cerebral infarction ([address]     [facility]     Chronic kidney disease     Depression     Diabetes mellitus out of control ([address]     Diabetes mellitus, type II ([address]     2005    Diabetic neuropathy associated with type 2 diabetes mellitus ([address]     Generalized headaches     Hypertension     Infertility     Insomnia     chronic vs lack of time spent to sleep    Migraine headache 11/09/2011    Mixed hyperlipidemia     Otitis media     h/o recurrent    Pelvic abscess in female 10/05/2013    Pneumonia     2004 approx.     Stroke ([address] 08/27/2020    Stroke ([address] 08/27/2021       Past Surgical History:   Past Surgical History:   Procedure Laterality Date    CERVIX SURGERY      laser tx for dysplasia;1992    EYE SURGERY      FOOT SURGERY Right     FOOT SURGERY Bilateral     FOOT SURGERY Left     IR TUNNELED CATHETER PLACEMENT GREATER THAN 5 YEARS  9/7/2021    IR TUNNELED CATHETER PLACEMENT GREATER THAN 5 YEARS 9/7/2021 [facility]       Social History:   Social History     Tobacco History     Smoking Status  Current Every Day Smoker Smoking Frequency  1 pack/day Smoking Tobacco Type  Cigarettes    Smokeless Tobacco Use  Never Used          Alcohol History     Alcohol Use Status  No Comment  Very Rare          Drug Use     Drug Use Status  No          Sexual Activity     Sexually Active  Yes Partners  Male                Fam History:   Family History   Problem Relation Age of Onset    Diabetes Mother    Esperanza Coral Other Mother 79        Covid    Diabetes Father     High Blood Pressure Father     Colon Cancer Father     Diabetes Sister     Alcohol Abuse Maternal Grandfather     Diabetes Paternal Grandmother     Alcohol Abuse Paternal Grandfather     Diabetes Paternal Aunt     Diabetes Paternal Uncle        PFSH: The above PMHx, PSHx, SocHx, FamHx has been reviewed by myself. (1 area for detailed, 2-3 for comprehensive)      Code Status: Prior    Meds - following list of home medications fromelectronic chart has been reviewed by myself  Prior to Admission medications    Medication Sig Start Date End Date Taking?  Authorizing Provider   carvedilol (COREG) 6.25 MG tablet Take 6.25 mg by mouth 2 times daily (with meals) 3/23/21   Historical Provider, MD   clonazePAM (KLONOPIN) 0.5 MG tablet  8/20/21   Historical Provider, MD   glycopyrrolate-formoterol (Mali Lou) 9-4.8 MCG/ACT AERO Inhale 2 puffs into the lungs 2 times daily 9/23/21   Damon Mireles   cloNIDine (CATAPRES) 0.2 MG/24HR PTWK Place 1 patch onto the skin once a week 9/17/21   Carmen Fitzgerald MD   glucose (GLUTOSE) 40 % GEL Take 37.5 mLs by mouth as needed (low blood sugar) 9/13/21   Carmen Fitzgerald MD   FLUoxetine (PROZAC) 20 MG capsule Take 1 capsule by mouth daily 9/13/21   Keila Thomas Dominique Joe MD   furosemide (LASIX) 40 MG tablet Take 1 tablet by mouth daily 9/13/21   Joselyn Liang MD   Dulaglutide 0.75 MG/0.5ML SOPN Inject 0.75 mg into the skin once a week 7/8/21   Damon Mireles   amLODIPine (NORVASC) 10 MG tablet Take 1 tablet by mouth daily 7/8/21   Damon Mireles   spironolactone (ALDACTONE) 50 MG tablet Take 1 tablet by mouth daily 7/8/21   Damon Mireles   atorvastatin (LIPITOR) 40 MG tablet Take 1 tablet by mouth nightly 7/8/21   Damon Mireles   Insulin Pen Needle 29G X 12.7MM MISC 1 each by Does not apply route daily 7/8/21   Damon Mireles   propranolol (INDERAL LA) 80 MG extended release capsule Take 1 capsule by mouth every morning 7/8/21   Rajesh Pruitt   aspirin 81 MG EC tablet Take 1 tablet by mouth daily 9/1/20   Jailene Stubbs MD   pregabalin (LYRICA) 75 MG capsule Take 1 capsule by mouth nightly for 7 days. 8/31/20 7/8/21  Jailene Stubbs MD   glucose monitoring kit (FREESTYLE) monitoring kit 1 kit by Does not apply route daily 5/13/20   Mariana De La Garza MD   insulin lispro, 1 Unit Dial, 100 UNIT/ML SOPN Inject 0-6 Units into the skin 3 times daily (with meals) **Corrective Low Dose Algorithm**  Glucose: Dose:               No Insulin  140-199 1 Unit  200-249 2 Units  250-299 3 Units  300-349 4 Units  350-399 5 Units  Over 399 6 Units  Patient taking differently: Inject 6-12 Units into the skin 3 times daily (with meals) **Corrective Low Dose Algorithm**  Glucose: Dose:               No Insulin  140-199 1 Unit  200-249 2 Units  250-299 3 Units  300-349 4 Units  350-399 5 Units  Over 399 6 Units 4/29/20   Kaelyn Raza MD   glucose blood VI test strips (VICTORY AGM-4000 TEST) strip Test four times daily until controlled and then three times daily.   Code 250.02  ONE TOUCH ULTRA MONITOR 10/15/13 2/23/21  Maurice Tee MD         Allergies   Allergen Reactions    Amoxicillin Itching and Hives     Tolerates cephalosporins  Patient tolerating cefazolin (ANCEF) as of October 11, 2018  Patient tolerating cefazolin (ANCEF) as of October 11, 2018  Tolerates cephalosporins  Patient tolerating cefazolin (ANCEF) as of October 11, 2018    Levofloxacin Anaphylaxis    Vancomycin Shortness Of Breath, Hives and Anaphylaxis    Tape [Adhesive Tape] Other (See Comments)     Paper tape turns skin bright red. Plastic tape okay. EXAM: (2-7 system for EPF/Detailed, ?8 for Comprehensive)  BP (!) 140/72   Pulse 85   Temp 97.6 °F (36.4 °C) (Oral)   Resp 14   Ht 5' 7\" (1.702 m)   Wt 213 lb (96.6 kg)   SpO2 93%   BMI 33.36 kg/m²   Constitutional: vitals as above: alert, appears stated age and cooperative  Psychiatric: normal insight and judgment, oriented to person, place, time, and general circumstances  Head: Normocephalic, without obvious abnormality, atraumatic  Eyes:lids and lashes normal, conjunctivae and sclerae normal and pupils equal, round, reactive to light and accomodation  EMNT: external ears normal, lips nrmal  Neck: no adenopathy, supple, symmetrical, trachea midline and thyroid not enlarged, symmetric, no tenderness/mass/nodules   Respiratory: clear to auscultation and percussion bilaterally with normal respiratory effort  Cardiovascular: normal rate, regular rhythm, normal S1 and S2 and no carotid bruits  Gastrointestinal: soft, non-tender, non-distended, normal bowel sounds, no masses or organomegaly  Lymphatic:   Extremities: no edema, no clubbing  Skin:No rashes or nodules noted.   Neurologic:    LABS:  Labs Reviewed   CBC WITH AUTO DIFFERENTIAL - Abnormal; Notable for the following components:       Result Value    RBC 2.31 (*)     Hemoglobin 6.6 (*)     Hematocrit 20.3 (*)     RDW 16.9 (*)     All other components within normal limits    Narrative:     Rob Anderson  [facility] tel. [phone],  Hematology results called to and read back by marian almanza,  10/04/2021 12:43, by Jose A Roblero  Performed at:  [facility]  [address] [facility], [address]   Phone [phone]   COMPREHENSIVE METABOLIC PANEL W/ REFLEX TO MG FOR LOW K - Abnormal; Notable for the following components:    Potassium reflex Magnesium 5.3 (*)     Glucose 104 (*)     BUN 50 (*)     CREATININE 5.0 (*)     GFR Non- 9 (*)     GFR  11 (*)     Total Protein 6.3 (*)     Albumin 3.3 (*)     All other components within normal limits    Narrative:     Performed at:  [facility] [address], [facility], [address]   Phone [phone]    Narrative:     Stoverjocelyn Gilbert  [facility] tel. [phone],  Rejected Testcmpe/Called to:marian Reyes, 10/04/2021 12:57, by Yuly Barboza  Performed at:  [facility] [address], [facility], [address]   Phone [phone]   BLOOD OCCULT STOOL DIAGNOSTIC   TYPE AND SCREEN    Narrative:     Stovershakira Gilbert  [facility] tel. [phone],  Rejected Testcmpe/Called to:marian Reyes, 10/04/2021 12:57, by Yuly Barboza  Performed at:  [facility] [address], [facility], [address]   Phone [phone]   PREPARE RBC (CROSSMATCH)    Narrative:     Stover Lindame  [email or web address] tel. [phone],  Rejected Testcmpe/Called to:marian Reyes, 10/04/2021 12:57, by Yuly Barboza         IMAGING:  Imaging results from the ER have been reviewed in the computerized chart. IR TUNNELED CVC PLACE WO SQ PORT/PUMP > 5 YEARS    Result Date: 9/7/2021  PROCEDURE: FLUOROSCOPY GUIDED PLACEMENT OF A TUNNELED CATHETER. MODERATE CONSCIOUS SEDATION 9/7/2021. HISTORY: ORDERING SYSTEM PROVIDED HISTORY: Tunneled HD catheter TECHNOLOGIST PROVIDED HISTORY: Reason for exam:->Tunneled HD catheter How many lumens are being requested?->2 What side should this line be placed? ->Either What site is the preferred site? ->No preference Is the patient pregnant?->No SEDATION: Intravenous sedation was administered with continuous monitoring throughout the procedure. In measured doses, a total of intravenous , 0.5 mg intravenous Versed and 25 mcg intravenous fentanyl was administered. My total procedure time with moderate sedation was 15 minutes. ESTIMATED BLOOD LOSS: None. FLUOROSCOPY DOSE AND TYPE OR TIME AND EXPOSURES: 0.1 minute, 1 digital image. TECHNIQUE: Informed consent was obtained after a detailed explanation of the procedure including risks, benefits, and alternatives. Universal protocol was observed. The right neck and chest were prepped and draped in sterile fashion using maximum sterile barrier technique. Local anesthesia was achieved with lidocaine. The existing temporary catheter was utilized with wire placement to obtain length of the new tunneled catheter. A 0.035 guidewire was used to place a peel-away sheath. A chest wall incision was made and the catheter tubing was attached and tunneled subcutaneously to the prior venotomy site. The tunneled dialysis catheter tubing was 23 cm and advanced through the peel-away sheath under fluoroscopic guidance to the mid right atrium. The external catheter was sewn to the skin surface with 2 separate sutures, 2-0 silk. Both ports aspirated with easy blood return and were flushed easily with heparinized saline. The ports were locked with heparinized saline. The patient tolerated the procedure well and there were no immediate complications. Immediate digital image reveals accurate positioning of the catheter tip in the mid right atrium. FINDINGS: The tunneled catheter has a normal expected course and the catheter tip placement is in the mid right atrium. Successful fluoroscopy guided right IJ placement of the tunneled dialysis catheter. The catheter is available for immediate utilization. EKG: from ER, interpreted by self, normal sinus rhythm at 84. No acute st elevatin noted. No TWI seen .  Comparison made to ekg in old chart dated 8/27/21,shows similar rate and form          MEDICAL DECISION MAKING:    Principal Problem:    Anemia -New Problem to me. Profound anemia, cause unclear, but seems most likely 2/2 renal disease  Plan: indication for transfusion. Cont to monitor h/h to assess progression of anemia. Recommend ferrous sulfate or MVI as outpatient. Check occult stool. Active Problems:    Type 2 diabetes mellitus, with long-term current use of insulin ([facility] -Established problem. Stable. Plan: Patient placed on controlled carbohydrate diet. Fingerstick sugars to be checked to monitor for both hypoglycemia as well as hyperglycemia. Sliding scale insulin ordered. Glucagon and dextrose ordered for hypoglycemia. Patient will be continued on home medications. Hemoglobin a1c to be ordered to assess efficacy of therapy. Mixed hyperlipidemia -Established problem. Stable. Plan: Continue present orders/plan. HTN (hypertension), benign -Established problem. Stable. 140/72  Plan: Pt home BP meds reviewed and will be continued. IV Hydralazine ordered for control of extremely high blood pressures   Will monitor labs to assess Creat/K for possible complications of medications. ESRD (end stage renal disease) ([facility] -Established problem. Uncontrolled. Plan: for HD today. DR Niya Mckeon has seen patient          Diagnoses as listed above, designated as new or established and plan outlined for each. Data Reviewed:   (1) Lab tests were reviewed or ordered. (1) Radiology tests were reviewed or ordered. (1) Medical test (Echo, EKG, PFT/ashley) were ordered. (1)History was not obtained from someone other than patient  (1) Old records  were reviewed - see HPI/MDM for pertinent details if review done. (2) Case wasdiscussed with another health care provider: Jet PRICE  (2) Imaging was viewed by myself. (2) EKG  was viewed by myself.        The patient isbeing placed in inpatient status with the expectation of requiring a hospital stay spanning at least two midnights for care and treatment of the problems noted in the problem list.  This determination is also based on thepatients comorbidities and past medical history, the severity and timing of the signs and symptoms upon presentation.         Electronically signed by: Walker Goins MD 10/4/2021

## 2021-10-04 NOTE — ED NOTES
Bed brought to ER from . Pt transferred to bed. Pt to go to dialysis before going to floor. Report given to [facility]. Transferred with blood infusing without difficulty. No signs of reaction noted at this time.      Karen Dugan RN  10/04/21 [id number]

## 2021-10-04 NOTE — ED PROVIDER NOTES
EKG NOTE:    Sinus rhythm at a rate of 84 beats a minute with no acute ST elevations or depressions or pathologic leads. Normal axis. I was not involved with the care of this patient.        Jono Beckwith MD  10/04/21 [id number]

## 2021-10-05 VITALS
HEIGHT: 67 IN | WEIGHT: 195.5 LBS | SYSTOLIC BLOOD PRESSURE: 157 MMHG | TEMPERATURE: 97.9 F | BODY MASS INDEX: 30.69 KG/M2 | HEART RATE: 97 BPM | DIASTOLIC BLOOD PRESSURE: 73 MMHG | RESPIRATION RATE: 18 BRPM | OXYGEN SATURATION: 97 %

## 2021-10-05 LAB
A/G RATIO: 1 (ref 1.1–2.2)
ALBUMIN SERPL-MCNC: 3.4 G/DL (ref 3.4–5)
ALP BLD-CCNC: 118 U/L (ref 40–129)
ALT SERPL-CCNC: 12 U/L (ref 10–40)
ANION GAP SERPL CALCULATED.3IONS-SCNC: 14 MMOL/L (ref 3–16)
AST SERPL-CCNC: 14 U/L (ref 15–37)
BASOPHILS ABSOLUTE: 0 K/UL (ref 0–0.2)
BASOPHILS RELATIVE PERCENT: 0.3 %
BILIRUB SERPL-MCNC: 0.4 MG/DL (ref 0–1)
BUN BLDV-MCNC: 29 MG/DL (ref 7–20)
C-REACTIVE PROTEIN: 6.1 MG/L (ref 0–5.1)
CALCIUM SERPL-MCNC: 8.8 MG/DL (ref 8.3–10.6)
CHLORIDE BLD-SCNC: 103 MMOL/L (ref 99–110)
CO2: 20 MMOL/L (ref 21–32)
CREAT SERPL-MCNC: 3.3 MG/DL (ref 0.6–1.1)
EOSINOPHILS ABSOLUTE: 0 K/UL (ref 0–0.6)
EOSINOPHILS RELATIVE PERCENT: 0 %
GFR AFRICAN AMERICAN: 18
GFR NON-AFRICAN AMERICAN: 15
GLOBULIN: 3.3 G/DL
GLUCOSE BLD-MCNC: 184 MG/DL (ref 70–99)
GLUCOSE BLD-MCNC: 195 MG/DL (ref 70–99)
GLUCOSE BLD-MCNC: 201 MG/DL (ref 70–99)
HCT VFR BLD CALC: 26.4 % (ref 36–48)
HCT VFR BLD CALC: 31 % (ref 36–48)
HEMOGLOBIN: 10.1 G/DL (ref 12–16)
HEMOGLOBIN: 8.6 G/DL (ref 12–16)
LACTIC ACID: 0.9 MMOL/L (ref 0.4–2)
LYMPHOCYTES ABSOLUTE: 0.5 K/UL (ref 1–5.1)
LYMPHOCYTES RELATIVE PERCENT: 6.9 %
MCH RBC QN AUTO: 28.4 PG (ref 26–34)
MCHC RBC AUTO-ENTMCNC: 32.5 G/DL (ref 31–36)
MCV RBC AUTO: 87.2 FL (ref 80–100)
MONOCYTES ABSOLUTE: 0.1 K/UL (ref 0–1.3)
MONOCYTES RELATIVE PERCENT: 0.9 %
NEUTROPHILS ABSOLUTE: 6.6 K/UL (ref 1.7–7.7)
NEUTROPHILS RELATIVE PERCENT: 91.9 %
PDW BLD-RTO: 16.7 % (ref 12.4–15.4)
PERFORMED ON: ABNORMAL
PERFORMED ON: ABNORMAL
PHOSPHORUS: 4.3 MG/DL (ref 2.5–4.9)
PLATELET # BLD: 230 K/UL (ref 135–450)
PMV BLD AUTO: 9.3 FL (ref 5–10.5)
POTASSIUM SERPL-SCNC: 5 MMOL/L (ref 3.5–5.1)
RBC # BLD: 3.55 M/UL (ref 4–5.2)
SARS-COV-2: NOT DETECTED
SODIUM BLD-SCNC: 137 MMOL/L (ref 136–145)
TOTAL PROTEIN: 6.7 G/DL (ref 6.4–8.2)
WBC # BLD: 7.2 K/UL (ref 4–11)

## 2021-10-05 PROCEDURE — 5A1D70Z PERFORMANCE OF URINARY FILTRATION, INTERMITTENT, LESS THAN 6 HOURS PER DAY: ICD-10-PCS | Performed by: INTERNAL MEDICINE

## 2021-10-05 PROCEDURE — 97535 SELF CARE MNGMENT TRAINING: CPT

## 2021-10-05 PROCEDURE — 97530 THERAPEUTIC ACTIVITIES: CPT

## 2021-10-05 PROCEDURE — 87040 BLOOD CULTURE FOR BACTERIA: CPT

## 2021-10-05 PROCEDURE — 83605 ASSAY OF LACTIC ACID: CPT

## 2021-10-05 PROCEDURE — 80053 COMPREHEN METABOLIC PANEL: CPT

## 2021-10-05 PROCEDURE — 2580000003 HC RX 258: Performed by: INTERNAL MEDICINE

## 2021-10-05 PROCEDURE — 86140 C-REACTIVE PROTEIN: CPT

## 2021-10-05 PROCEDURE — 6360000002 HC RX W HCPCS: Performed by: INTERNAL MEDICINE

## 2021-10-05 PROCEDURE — 97161 PT EVAL LOW COMPLEX 20 MIN: CPT

## 2021-10-05 PROCEDURE — 36415 COLL VENOUS BLD VENIPUNCTURE: CPT

## 2021-10-05 PROCEDURE — 85025 COMPLETE CBC W/AUTO DIFF WBC: CPT

## 2021-10-05 PROCEDURE — 6370000000 HC RX 637 (ALT 250 FOR IP): Performed by: INTERNAL MEDICINE

## 2021-10-05 PROCEDURE — G0378 HOSPITAL OBSERVATION PER HR: HCPCS

## 2021-10-05 PROCEDURE — 97165 OT EVAL LOW COMPLEX 30 MIN: CPT

## 2021-10-05 PROCEDURE — 96375 TX/PRO/DX INJ NEW DRUG ADDON: CPT

## 2021-10-05 PROCEDURE — 96376 TX/PRO/DX INJ SAME DRUG ADON: CPT

## 2021-10-05 PROCEDURE — 85014 HEMATOCRIT: CPT

## 2021-10-05 PROCEDURE — 85018 HEMOGLOBIN: CPT

## 2021-10-05 RX ORDER — INSULIN LISPRO 100 [IU]/ML
0-3 INJECTION, SOLUTION INTRAVENOUS; SUBCUTANEOUS NIGHTLY
Status: DISCONTINUED | OUTPATIENT
Start: 2021-10-05 | End: 2021-10-05 | Stop reason: HOSPADM

## 2021-10-05 RX ORDER — INSULIN LISPRO 100 [IU]/ML
0-6 INJECTION, SOLUTION INTRAVENOUS; SUBCUTANEOUS
Status: DISCONTINUED | OUTPATIENT
Start: 2021-10-05 | End: 2021-10-05 | Stop reason: HOSPADM

## 2021-10-05 RX ADMIN — FLUOXETINE 20 MG: 20 CAPSULE ORAL at 11:22

## 2021-10-05 RX ADMIN — SPIRONOLACTONE 50 MG: 25 TABLET ORAL at 11:22

## 2021-10-05 RX ADMIN — PROPRANOLOL HYDROCHLORIDE 80 MG: 80 CAPSULE, EXTENDED RELEASE ORAL at 11:23

## 2021-10-05 RX ADMIN — Medication 10 ML: at 04:26

## 2021-10-05 RX ADMIN — ASPIRIN 81 MG: 81 TABLET, COATED ORAL at 11:22

## 2021-10-05 RX ADMIN — AMLODIPINE BESYLATE 10 MG: 5 TABLET ORAL at 11:22

## 2021-10-05 RX ADMIN — HYDRALAZINE HYDROCHLORIDE 10 MG: 20 INJECTION INTRAMUSCULAR; INTRAVENOUS at 04:25

## 2021-10-05 RX ADMIN — HEPARIN SODIUM 5000 UNITS: 5000 INJECTION INTRAVENOUS; SUBCUTANEOUS at 05:47

## 2021-10-05 RX ADMIN — FUROSEMIDE 80 MG: 40 TABLET ORAL at 11:22

## 2021-10-05 ASSESSMENT — PAIN DESCRIPTION - DESCRIPTORS
DESCRIPTORS: NUMBNESS
DESCRIPTORS: NUMBNESS;TINGLING

## 2021-10-05 ASSESSMENT — ENCOUNTER SYMPTOMS
EYE REDNESS: 0
CONSTIPATION: 0
SHORTNESS OF BREATH: 0
CHEST TIGHTNESS: 0
EYE PAIN: 0
DIARRHEA: 0

## 2021-10-05 ASSESSMENT — PAIN DESCRIPTION - LOCATION: LOCATION: LEG;HAND

## 2021-10-05 ASSESSMENT — PAIN SCALES - GENERAL
PAINLEVEL_OUTOF10: 6
PAINLEVEL_OUTOF10: 0
PAINLEVEL_OUTOF10: 7

## 2021-10-05 ASSESSMENT — PAIN DESCRIPTION - PAIN TYPE
TYPE: NEUROPATHIC PAIN
TYPE: NEUROPATHIC PAIN

## 2021-10-05 ASSESSMENT — PAIN DESCRIPTION - ORIENTATION: ORIENTATION: RIGHT;LEFT

## 2021-10-05 NOTE — PROGRESS NOTES
Pt discharged all orders reviewed and questions answered.   to transport to private residence he is on way

## 2021-10-05 NOTE — PROGRESS NOTES
Physical Therapy    Facility/Department: [address] ONCOLOGY  Initial Assessment/Discharge Summary    NAME: Kentrell Jiang  : 1975  MRN: [id number]    Date of Service: 10/5/2021    Discharge Recommendations: Kentrell Jiang scored a 19/24 on the AM-PAC short mobility form. At this time, no further PT is recommended upon discharge due to pt being at baseline functional mobility. Recommend patient returns to prior setting with prior services. PT Equipment Recommendations  Equipment Needed: No    Assessment   Assessment: Pt is presenting at baseline functional mobility and does not require skilled PT services at this time. As such, pt is being discharged from acute PT caseload. Prognosis: Excellent  Decision Making: Low Complexity  Clinical Presentation: stable  PT Education: PT Role;General Safety; Energy Conservation  Patient Education: d/c recommendations--pt verbalizing understanding  Barriers to Learning: vision  REQUIRES PT FOLLOW UP: No  Activity Tolerance  Activity Tolerance: Patient Tolerated treatment well       Patient Diagnosis(es): The primary encounter diagnosis was Symptomatic anemia. Diagnoses of ESRD on hemodialysis ([address] and Hypoxia were also pertinent to this visit. has a past medical history of Blind in both eyes, Cerebral artery occlusion with cerebral infarction ([address] CHF (congestive heart failure) ([address] Chronic kidney disease, Depression, Diabetes mellitus out of control ([address] Diabetes mellitus, type II ([address] Diabetic neuropathy associated with type 2 diabetes mellitus ([address] Generalized headaches, Hypertension, Infertility, Insomnia, Migraine headache, Mixed hyperlipidemia, Otitis media, Pelvic abscess in female, Pneumonia, Stroke [facility]), and Stroke ([address] has a past surgical history that includes Cervix surgery; eye surgery; Foot surgery (Right); Foot surgery (Bilateral);  Foot surgery (Left); and IR TUNNELED CVC PLACE WO SQ PORT/PUMP > 5 YEARS (9/7/2021). Restrictions  Restrictions/Precautions  Restrictions/Precautions: Fall Risk (high fall risk, carb control diet)  Required Braces or Orthoses?: No  Position Activity Restriction  Other position/activity restrictions: Faisal Jones is a 55 y.o. female who presents To the emergency department today at the direction of her nephrologist due to a low hemoglobin. Patient with history of end-stage renal disease on hemodialysis. She states that her last dialysis was Friday. She did have her full treatment at that time. Patient states that she received a phone call this morning stating that her hemoglobin was only 5.4. Patient states that she was admitted to this hospital in late August after having a CVA and states that she did have to have a blood transfusion during her stay. Patient denies hematemesis, hematochezia, or melena. Patient states that she is unsure where this blood loss is coming from. Patient states that she feels \"fine\". She adamantly denies having any chest pain. She denies shortness of breath. She states that she feels weak but states that this is her baseline. She denies recent travel or known sick contacts. She has not been vaccinated against Covid. Vision/Hearing  Vision: Impaired  Vision Exceptions: Legally blind (diabetic retinopathy)  Hearing: Within functional limits       Subjective  General  Chart Reviewed: Yes  Response To Previous Treatment: Not applicable  Family / Caregiver Present: No  Diagnosis: hypoxia  Follows Commands: Within Functional Limits  General Comment  Comments: Pt supine in bed upon arrival.  Subjective  Subjective: Pt agreeable to PT/OT nino, reporting 7/10 neuropathic pain in BLE--RN aware.   Pain Screening  Patient Currently in Pain: Yes  Vital Signs  Patient Currently in Pain: Yes       Orientation  Orientation  Overall Orientation Status: Within Functional Limits     Social/Functional History  Social/Functional History  Lives With: Spouse  Type of Home: House  Home Layout: One level  Home Access: Level entry  Bathroom Shower/Tub: Shower chair with back, Walk-in shower  Bathroom Toilet: Standard (insurance is working on getting handicap toilet)  Bathroom Equipment: Grab bars in shower  Bathroom Accessibility: Accessible  Home Equipment: Rolling walker, Grab bars  Receives Help From: Family  ADL Assistance: Needs assistance ( assists with shower for safety \"just in case\" and with dressing due to low vision)  Homemaking Assistance: Needs assistance  Homemaking Responsibilities: Yes (pt assists with cooking)  Ambulation Assistance: Independent (RW out of the home and during long walks in the home; no AD bed to bathroom; occasional assist from  for navigation in unfamiliar environments due to low vision)  Transfer Assistance: Independent  Active : No  Patient's  Info:  drives  Occupation: Unemployed (in process of disability)  Leisure & Hobbies: cooking  Additional Comments: Pt reports ~1 fall/month with causes including sliding out of bed, CVA, and LOB when reaching down.      Cognition   Cognition  Overall Cognitive Status: WFL    Objective  AROM RLE (degrees)  RLE AROM: WFL  AROM LLE (degrees)  LLE AROM : WFL  Strength RLE  Strength RLE: WFL  Strength LLE  Strength LLE: WFL  Tone RLE  RLE Tone: Normotonic  Tone LLE  LLE Tone: Normotonic  Motor Control  Gross Motor?: WFL  Sensation  Overall Sensation Status: Impaired (n/t in bilateral UEs and LEs secondary to DM and L CVA with R side more affected; pt reports increased UE symptoms from baseline)  Bed mobility  Supine to Sit: Modified independent  Scooting: Modified independent  Transfers  Sit to Stand: Supervision (x1 EOB)  Stand to sit: Supervision (x1 chair)  Ambulation  Ambulation?: Yes  Ambulation 1  Surface: level tile  Device: No Device  Assistance: Supervision  Quality of Gait: appropriate Chio, appropriate noble  Distance: 10'+10'  Comments: VC provided for navigation (pt's spouse provides this at home). No LOB.   Stairs/Curb  Stairs?: No     Balance  Posture: Good  Sitting - Static: Good  Sitting - Dynamic: Good  Standing - Static: Good (supervision)  Standing - Dynamic: Good (supervision standing at sink for ADLs ~15-18 minutes total)        Plan   Plan  Times per week: discharge  Safety Devices  Type of devices: Call light within reach, Gait belt, Nurse notified, Left in chair  Restraints  Initially in place: No    G-Code       OutComes Score                    AM-PAC Score  AM-PAC Inpatient Mobility Raw Score : 19 (10/05/21 1226)  AM-PAC Inpatient T-Scale Score : 45.44 (10/05/21 1226)  Mobility Inpatient CMS 0-100% Score: 41.77 (10/05/21 1226)  Mobility Inpatient CMS G-Code Modifier : CK (10/05/21 1226)          Goals  Short term goals  Time Frame for Short term goals: none, discharge from acute PT caseload       Therapy Time   Individual Concurrent Group Co-treatment   Time In 0904         Time Out 0944         Minutes 40              Timed Code Treatment Minutes:   25    Total Treatment Minutes:  [address] [place], [place] [id number]

## 2021-10-05 NOTE — PROGRESS NOTES
Patient's admission and head to toe assessment completed. call light within reach. Patient denies any pain at the moment. Patient resting in bed. Will continue to monitor.

## 2021-10-05 NOTE — PROGRESS NOTES
Patient complain of numbness of lower extremities, upper extremities tremors. Blood sugar checked 195, neuro assessment. Dr. Huitron Bound to be notified.  Will continue to monitor

## 2021-10-05 NOTE — PROGRESS NOTES
Treatment time: 3 hours  Net UF: 3600 ml     Pre weight: 91.7 kg  Post weight:88.1 kg  EDW: 90 kg (Challenging)     10/04/21 1734 10/04/21 2050   Vital Signs   BP (!) 159/77 (!) 166/79   Temp 95.7 °F (35.4 °C) 97.2 °F (36.2 °C)   Pulse 83 85   Resp 20 18   SpO2 96 % 96 %   Height 5' 7\" (1.702 m) 5' 7\" (1.702 m)   Weight 202 lb 2.6 oz (91.7 kg) 194 lb 3.6 oz (88.1 kg)   Weight Method Standing scale Standing scale   Percent Weight Change -5.09 -3.93   Dry Weight 198 lb 6.6 oz (90 kg) 198 lb 6.6 oz (90 kg)     Access used: R TDC    Access function: Well with  ml/min     Medications or blood products given: n/a     Regular outpatient schedule: [facility]IT[facility]     Summary of response to treatment: Patient tolerated treatment well and without any complications. Patient remained stable throughout entire treatment and upon the dialysis suite via transport. Report given to HAYLEE Hahn and copy of dialysis treatment record placed in chart, to be scanned into EMR.

## 2021-10-05 NOTE — CARE COORDINATION
Patient discharged 10/5/2021 to home  All discharge needs met per case management    Jessica Thurman RN, BSN  [phone]

## 2021-10-05 NOTE — DISCHARGE SUMMARY
[facility] -- Physician Discharge Summary     Jed Orellana  1975  MRN: [id number]    Admit Date: 10/4/2021  Discharge Date: No discharge date for patient encounter.     Attending MD: Karo Roe MD  Discharging MD: Karo Roe MD  PCP: Sabine Silver [address] [facility] [address] [facility] [address] [phone]    Admission Diagnosis: Hypoxia [R09.02]  ESRD on hemodialysis ([facility] [N18.6, Z99.2]  Symptomatic anemia [D64.9]  Acute respiratory failure with hypoxemia ([facility] [J96.01]  DISCHARGE DIAGNOSIS: same    Full Hospital Problem List:  Active Hospital Problems    Diagnosis Date Noted    ESRD (end stage renal disease) ([facility] [N18.6] 10/04/2021    Acute respiratory failure with hypoxemia ([facility] [J96.01] 10/04/2021    HTN (hypertension), benign [I10]     Anemia [D64.9] 10/05/2013    Type 2 diabetes mellitus, with long-term current use of insulin ([facility] [E11.9, Z79.4]     Mixed hyperlipidemia [E78.2]            Hospital Course:  55 y.o. female who presents To the emergency department today at the direction of her nephrologist due to a low hemoglobin.  Patient with history of end-stage renal disease on hemodialysis.  She states that her last dialysis was Friday.  She did have her full treatment at that time. Shavonne Drew states that she received a phone call this morning stating that her hemoglobin was only 5.4. Shavonne Drew states that she was admitted to this hospital in late August after having a CVA and states that she did have to have a blood transfusion during her stay.  Patient denies hematemesis, hematochezia, or melena.  Patient states that she is unsure where this blood loss is coming from. Shavonne Drew states that she feels \"fine\".  She adamantly denies having any chest pain.  She denies shortness of breath.  She states that she feels weak but states that this is her baseline.  She denies recent travel or known sick contacts. Danielle Mcconnell has not been vaccinated against Covid.       Her hgb is extremely low  Transfusion has been ordered in ER by ER staff  Pt seen by renal, for dialysis on day of admit  Creat is much better next am, 3.3    Pt's hgb is much better after transfusion  Occult stool negative  As such, it is felt that it is due to renal disease  RIA: w/ HD, once Iron stores adequate   Would have pt get workup as outpt    Pt is on outpt MWF schedule for HD  Advised to continue going to regular sessions    Consults made during Hospitalization:  IP CONSULT TO NEPHROLOGY  IP CONSULT TO INTERNAL MEDICINE  IP CONSULT TO NEPHROLOGY    Treatment team at time of Discharge: Treatment Team: Attending Provider: Quinn Leiva MD; Consulting Physician: David Joseph MD; Consulting Physician: Quinn Leiva MD; Consulting Physician: Ibeth Mendoza MD; Respiratory Therapist (Day): Daly De La Garza RCP; Registered Nurse: En Shearer RN; Physical Therapist: Carrie Box PT; Occupational Therapist: Ama Martinez OT    Imaging Results:  XR CHEST PORTABLE    Result Date: 10/4/2021  EXAMINATION: ONE XRAY VIEW OF THE CHEST 10/4/2021 2:33 pm COMPARISON: 10/03/2021 HISTORY: ORDERING SYSTEM PROVIDED HISTORY: SOB TECHNOLOGIST PROVIDED HISTORY: Reason for exam:->SOB Reason for Exam: Shortness of breath. Acuity: Acute Type of Exam: Initial FINDINGS: Worsening multifocal airspace consolidation in both lungs. Findings suggest worsening pneumonia. Removal of ET tube since prior. A right central line with tips in the SVC. No pneumothorax. No pleural effusion. Cardiomediastinal silhouette and bony thorax are unchanged. Extensive multifocal airspace disease in both lungs worsening since prior examination suggesting worsening pneumonia. IR TUNNELED CVC PLACE WO SQ PORT/PUMP > 5 YEARS    Result Date: 9/7/2021  PROCEDURE: FLUOROSCOPY GUIDED PLACEMENT OF A TUNNELED CATHETER. MODERATE CONSCIOUS SEDATION 9/7/2021.  HISTORY: ORDERING SYSTEM PROVIDED HISTORY: Tunneled HD catheter TECHNOLOGIST

## 2021-10-05 NOTE — PROGRESS NOTES
4 Eyes Skin Assessment     The patient is being assess for  Admission    I agree that 2 RN's have performed a thorough Head to Toe Skin Assessment on the patient. ALL assessment sites listed below have been assessed. Areas assessed by both nurses:   [x]   Head, Face, and Ears   [x]   Shoulders, Back, and Chest  [x]   Arms, Elbows, and Hands   [x]   Coccyx, Sacrum, and IschIum  [x]   Legs, Feet, and Heels        Does the Patient have Skin Breakdown?   No         Robbie Prevention initiated:  Yes   Wound Care Orders initiated:  No      [facility] nurse consulted for Pressure Injury (Stage 3,4, Unstageable, DTI, NWPT, and Complex wounds), New and Established Ostomies:  NA      Nurse 1 eSignature: Electronically signed by Bree Justin RN on 10/5/21 at 5:01 AM EDT    **SHARE this note so that the co-signing nurse is able to place an eSignature**    Nurse 2 eSignature: Electronically signed by Rolanda Lamb RN on 10/5/21 at 5:21 AM EDT

## 2021-10-05 NOTE — PROGRESS NOTES
Occupational Therapy   Occupational Therapy Initial Assessment and Discharge Summary  Date: 10/5/2021   Patient Name: Quang Thompson  MRN: [id number]     : 1975    Date of Service: 10/5/2021    Discharge Recommendations: Quang Thompson scored a 24/24 on the AM-PAC ADL Inpatient form. At this time, no further OT is recommended upon discharge due to pt at baseline. Recommend patient returns to prior setting with prior services. OT Equipment Recommendations  Equipment Needed: No    Assessment   Assessment: Pt completing ADLs and functional mobility at baseline, and safe to return home with assist of . No acute OT services are indicated at this time. Prognosis: Good  Decision Making: Low Complexity  OT Education: OT Role;Plan of Care;Low Vision Education  Patient Education: nino d/c- pt verbalized understanding  REQUIRES OT FOLLOW UP: No  Activity Tolerance  Activity Tolerance: Patient Tolerated treatment well  Safety Devices  Safety Devices in place: Yes  Type of devices: All fall risk precautions in place; Left in chair;Call light within reach;Nurse notified; Chair alarm in place;Gait belt           Patient Diagnosis(es): The primary encounter diagnosis was Symptomatic anemia. Diagnoses of ESRD on hemodialysis ([address] and Hypoxia were also pertinent to this visit. has a past medical history of Blind in both eyes, Cerebral artery occlusion with cerebral infarction ([address] CHF (congestive heart failure) ([address] Chronic kidney disease, Depression, Diabetes mellitus out of control ([address] Diabetes mellitus, type II ([address] Diabetic neuropathy associated with type 2 diabetes mellitus ([address] Generalized headaches, Hypertension, Infertility, Insomnia, Migraine headache, Mixed hyperlipidemia, Otitis media, Pelvic abscess in female, Pneumonia, Stroke [facility]), and Stroke ([address] has a past surgical history that includes Cervix surgery; eye surgery; Foot surgery (Right); Foot surgery (Bilateral);  Foot surgery (Left); and IR TUNNELED CVC PLACE WO SQ PORT/PUMP > 5 YEARS (9/7/2021). Restrictions  Restrictions/Precautions  Restrictions/Precautions: Fall Risk (high fall risk, carb control diet)  Required Braces or Orthoses?: No  Position Activity Restriction  Other position/activity restrictions: Luis Cui is a 55 y.o. female who presents To the emergency department today at the direction of her nephrologist due to a low hemoglobin. Patient with history of end-stage renal disease on hemodialysis. She states that her last dialysis was Friday. She did have her full treatment at that time. Patient states that she received a phone call this morning stating that her hemoglobin was only 5.4. Patient states that she was admitted to this hospital in late August after having a CVA and states that she did have to have a blood transfusion during her stay. Patient denies hematemesis, hematochezia, or melena. Patient states that she is unsure where this blood loss is coming from. Patient states that she feels \"fine\". She adamantly denies having any chest pain. She denies shortness of breath. She states that she feels weak but states that this is her baseline. She denies recent travel or known sick contacts. She has not been vaccinated against Covid.     Subjective   General  Chart Reviewed: Yes  Patient assessed for rehabilitation services?: Yes  Family / Caregiver Present: No  Diagnosis: hypoxia  Subjective  Subjective: Pt in long sit in bed upon arrival; agreeable to eval.  Patient Currently in Pain: Yes  Pain Assessment  Pain Assessment: 0-10  Pain Level: 0  Vital Signs  Temp: 97.9 °F (36.6 °C)  Temp Source: Oral  Pulse: 97  Heart Rate Source: Monitor  Resp: 18  BP: (!) 157/73  MAP (mmHg): 101  Level of Consciousness: Alert (0)  MEWS Score: 1  Patient Currently in Pain: Yes  Oxygen Therapy  SpO2: 97 %  O2 Device: None (Room air)  Social/Functional History  Social/Functional History  Lives With: Spouse  Type of Home: House  Home Layout: One level  Home Access: Level entry  Bathroom Shower/Tub: Shower chair with back, Walk-in shower  Bathroom Toilet: Standard (insurance is working on getting handicap toilet)  Bathroom Equipment: Grab bars in shower  Bathroom Accessibility: Accessible  Home Equipment: Rolling walker, Grab bars  Receives Help From: Family  ADL Assistance: Needs assistance ( assists with shower for safety \"just in case\" and with dressing due to low vision)  Homemaking Assistance: Needs assistance  Homemaking Responsibilities: Yes (pt assists with cooking)  Ambulation Assistance: Independent (RW out of the home and during long walks in the home; no AD bed to bathroom; occasional assist from  for navigation in unfamiliar environments due to low vision)  Transfer Assistance: Independent  Active : No  Patient's  Info:  drives  Occupation: Unemployed (in process of disability)  Leisure & Hobbies: cooking  Additional Comments: Pt reports ~1 fall/month with causes including sliding out of bed, CVA, and LOB when reaching down. Objective   Vision: Impaired  Vision Exceptions: Legally blind (diabetic retinopathy)  Hearing: Within functional limits    Orientation  Overall Orientation Status: Within Functional Limits  Observation/Palpation  Posture: Good  Balance  Sitting Balance: Independent  Standing Balance: Supervision  Standing Balance  Time: ~8 minutes  Activity: functional mobility, transfers, ADLs  Comment: no device  Functional Mobility  Functional - Mobility Device: No device  Activity: To/from bathroom  Assist Level: Supervision  Functional Mobility Comments: no LOB  ADL  Grooming: Independent (face washing in stance at sink)  UE Bathing: Modified independent  (washed underarms with wipes at sink)  Toileting: Independent (pericare)  Additional Comments: Pt declined additional ADLs due to prior completion; reports independence.   Tone RUE  RUE Tone: Normotonic  Tone LUE  LUE Tone: Normotonic  Coordination  Movements Are Fluid And Coordinated: Yes     Bed mobility  Supine to Sit: Modified independent  Scooting: Modified independent  Transfers  Stand Step Transfers: Supervision  Sit to stand: Supervision  Stand to sit: Supervision  Transfer Comments: EOB>ambulating>chair  Vision - Basic Assessment  Prior Vision: Blind  Visual History: Macular degeneration  Vision Comments: diabetic retinopathy  Cognition  Overall Cognitive Status: WFL  Perception  Overall Perceptual Status: WFL     Sensation  Overall Sensation Status: Impaired (n/t in bilateral UEs and LEs secondary to DM and L CVA with R side more affected; pt reports increased UE symptoms from baseline)        LUE AROM (degrees)  LUE AROM : WFL  Left Hand AROM (degrees)  Left Hand AROM: WFL  RUE AROM (degrees)  RUE AROM : WFL  Right Hand AROM (degrees)  Right Hand AROM: WFL  LUE Strength  Gross LUE Strength: WFL  RUE Strength  Gross RUE Strength: WFL                   Plan   Plan  Times per week: eval and d/c    G-Code     OutComes Score                                                  AM-PAC Score        AM-[facility] Inpatient Daily Activity Raw Score: 24 (10/05/21 1258)  AM-PAC Inpatient ADL T-Scale Score : 57.54 (10/05/21 1258)  ADL Inpatient CMS 0-100% Score: 0 (10/05/21 1258)  ADL Inpatient CMS G-Code Modifier : [address] (10/05/21 1258)    Goals  Short term goals  Time Frame for Short term goals: No acute OT goals indicated at this time  Patient Goals   Patient goals : return home       Therapy Time   Individual Concurrent Group Co-treatment   Time In 0904         Time Out 0944         Minutes 40         Timed Code Treatment Minutes: [address], [place] [id number]

## 2021-10-05 NOTE — PROGRESS NOTES
Dr. Yan Knight notified of patient complain of numbness in her lower extremities. No new orders placed.  Will continue to monitor

## 2021-10-05 NOTE — PROGRESS NOTES
Patient returned from dialysis. Vitals stable except BP. Scheduled med given per [facility]. Admission to be completed after pt finish eating.  Will continue to monitor

## 2021-10-05 NOTE — PROGRESS NOTES
MD Sanjana Luu MD Tery Du, MD                Office: [phone]                      Fax: [phone]          NEPHROLOGY INPATIENT PROGRESS NOTE:     PATIENT NAME: Ailyn Gomez  : 1975  MRN: [id number]         IMPRESSION / RECOMMENDATION:      Admitted for:  Hypoxia [R09.02]  ESRD on hemodialysis ([facility] [address] [N18.6, Z99.2]  Symptomatic anemia [D64.9]  Acute respiratory failure with hypoxemia ([address] [J96.01]      1. ESRD on iHD MWF: .   - outpt HD center: [facility], Dr Grace Canseco   - recently started HD in 2021, during admission w/ ATN w/ KINZA on CKD-4, was lost for f/u,), had acute hypoxic respiratory failure, pneumonia - needing intubation  - Access: Rt IJ TDC  - next HD tomorrow     2. Hypertension/Volume Status:  - BP HTN - ok controlled - f/u w/ HD : resume home BP meds  - EDW - f/u outpt records -> 213 lbs on admission   - Na controlled   - hypoxia improving w/ HD     3. Electrolytes/acid-base:  K: Hyperkalemia - mild - f/u w/ HD   High AG metabolic acidosis: controlled    4. Anemia of renal failure: acute on chronic goal, 6.6 on admission w/ SOB   - FOBT, GI consult - since improving - primary team suggested outpt f/u  - Iron stores - fup at HD center   - needs PRBC, ok to give during dialysis,   - RIA: w/ HD, once Iron stores adequate     5. BMD:  - Phos,- follow iPTH outpt      History of uncontrolled diabetes last A1c was 11.3   Noncompliance in past   Morbid obesity: BMI low 30s      : Other supportive care :   - Check daily renal function panel with electrolytes-phosphorus  - Strict monitoring of I/Os, daily weight  - Renal feeds/diet  - Current medications reviewed. - Nephrotoxic medications have been discontinued. - Dose adjusted and appropriate.       - Dose meds for eGFR <15 mL/min/1.73m2.    - Avoid heavy opioids due to renal failure - may use very low dose dilaudid / fentanyl with close monitoring of CNS and respiratory depression. Other Problems:  Hospital Problems         Last Modified POA    * (Principal) Anemia (Chronic) 10/4/2021 Yes    Type 2 diabetes mellitus, with long-term current use of insulin (HCC) (Chronic) 10/4/2021 Yes    Mixed hyperlipidemia 10/4/2021 Yes    Overview Signed 8/8/2011  1:19 PM by Denia Ang MD     mixed         HTN (hypertension), benign 10/4/2021 Yes    ESRD (end stage renal disease) ([facility] [address] 10/4/2021 Yes    Acute respiratory failure with hypoxemia ([facility] [address] 10/4/2021 Yes          Please refer to orders. Multiple complex problems. High risk  Discussed with patient, and treatment team    Thank you for allowing me to participate in this patient's care. Please do not hesitate to contact me with any questions/concerns. We will follow along with you. Yosef Carroll MD  [facility] [address] [place] [address]  Office: [phone] or Via [employer]  Fax: [phone]    Time spent  ~ 35 minutes that included face-to-face meeting/discussion with patient's , treatment team (including primary/referring team and other consultants; included coordination of care with the treatment team; and review of patient's electronic medical records and ordering appropriates tests.       ===========================================  ===========================================    Subjective:  Seen resting in chair  Reports feeling better,  Wants go to home  S/p HD yesterday     Hypoxia better from 6L to 1L       Past medical, surgical, social and family medial history reviewed by me:   PAST MEDICAL HISTORY:   Prior to Admission Medications:  No current facility-administered medications on file prior to encounter.      Current Outpatient Medications on File Prior to Encounter   Medication Sig Dispense Refill    furosemide (LASIX) 80 MG tablet Take 80 mg by mouth daily      insulin glargine (LANTUS SOLOSTAR) 100 UNIT/ML injection pen Inject 20 Units into the skin every morning       lisinopril (PRINIVIL;ZESTRIL) 40 MG tablet Take 40 mg by mouth daily      clonazePAM (KLONOPIN) 0.5 MG tablet Take 0.5 mg by mouth 2 times daily as needed.  glycopyrrolate-formoterol (BEVESPI AEROSPHERE) 9-4.8 MCG/ACT AERO Inhale 2 puffs into the lungs 2 times daily 10.7 g 2    cloNIDine (CATAPRES) 0.2 MG/24HR PTWK Place 1 patch onto the skin once a week 4 patch 2    glucose (GLUTOSE) 40 % GEL Take 37.5 mLs by mouth as needed (low blood sugar) 45 g 1    FLUoxetine (PROZAC) 20 MG capsule Take 1 capsule by mouth daily 30 capsule 3    Dulaglutide 0.75 MG/0.5ML SOPN Inject 0.75 mg into the skin once a week 4 pen 1    amLODIPine (NORVASC) 10 MG tablet Take 1 tablet by mouth daily 30 tablet 1    spironolactone (ALDACTONE) 50 MG tablet Take 1 tablet by mouth daily 30 tablet 2    atorvastatin (LIPITOR) 40 MG tablet Take 1 tablet by mouth nightly 30 tablet 3    propranolol (INDERAL LA) 80 MG extended release capsule Take 1 capsule by mouth every morning 30 capsule 2    aspirin 81 MG EC tablet Take 1 tablet by mouth daily 30 tablet 3    pregabalin (LYRICA) 75 MG capsule Take 1 capsule by mouth nightly for 7 days. 7 capsule 0    insulin lispro, 1 Unit Dial, 100 UNIT/ML SOPN Inject 0-6 Units into the skin 3 times daily (with meals) **Corrective Low Dose Algorithm**  Glucose: Dose:               No Insulin  140-199 1 Unit  200-249 2 Units  250-299 3 Units  300-349 4 Units  350-399 5 Units  Over 399 6 Units 3 pen 0    Insulin Pen Needle 29G X 12.7MM MISC 1 each by Does not apply route daily 100 each 5       Medications Prior to Admission: furosemide (LASIX) 80 MG tablet, Take 80 mg by mouth daily  insulin glargine (LANTUS SOLOSTAR) 100 UNIT/ML injection pen, Inject 20 Units into the skin every morning   lisinopril (PRINIVIL;ZESTRIL) 40 MG tablet, Take 40 mg by mouth daily  clonazePAM (KLONOPIN) 0.5 MG tablet, Take 0.5 mg by mouth 2 times daily as needed.    glycopyrrolate-formoterol (Dian Young) 9-4.8 MCG/ACT AERO, Inhale 2 puffs into the lungs 2 times daily  cloNIDine (CATAPRES) 0.2 MG/24HR PTWK, Place 1 patch onto the skin once a week  glucose (GLUTOSE) 40 % GEL, Take 37.5 mLs by mouth as needed (low blood sugar)  FLUoxetine (PROZAC) 20 MG capsule, Take 1 capsule by mouth daily  Dulaglutide 0.75 MG/0.5ML SOPN, Inject 0.75 mg into the skin once a week  amLODIPine (NORVASC) 10 MG tablet, Take 1 tablet by mouth daily  spironolactone (ALDACTONE) 50 MG tablet, Take 1 tablet by mouth daily  atorvastatin (LIPITOR) 40 MG tablet, Take 1 tablet by mouth nightly  propranolol (INDERAL LA) 80 MG extended release capsule, Take 1 capsule by mouth every morning  aspirin 81 MG EC tablet, Take 1 tablet by mouth daily  pregabalin (LYRICA) 75 MG capsule, Take 1 capsule by mouth nightly for 7 days. insulin lispro, 1 Unit Dial, 100 UNIT/ML SOPN, Inject 0-6 Units into the skin 3 times daily (with meals) **Corrective Low Dose Algorithm** Glucose: Dose:              No Insulin 140-199 1 Unit 200-249 2 Units 250-299 3 Units 300-349 4 Units 350-399 5 Units Over 399 6 Units  Insulin Pen Needle 29G X 12.7MM MISC, 1 each by Does not apply route daily     Inpatient Medications:  Scheduled Meds:  Continuous Infusions:    PRN Meds:.        REVIEW OF SYSTEMS:  As mentioned in HPI and CC      PHYSICAL EXAM:  Patient Vitals for the past 24 hrs:   BP Temp Temp src Pulse Resp SpO2 Height Weight   10/05/21 0545 -- -- -- -- -- -- 5' 7\" (1.702 m) 195 lb 8 oz (88.7 kg)   10/05/21 0422 (!) 166/81 98.2 °F (36.8 °C) Oral 92 18 96 % -- --   10/05/21 0145 (!) 151/79 98.9 °F (37.2 °C) Oral 91 18 94 % -- --   10/04/21 2159 (!) 175/95 98 °F (36.7 °C) Oral 90 18 99 % -- --   10/04/21 2050 (!) 166/79 97.2 °F (36.2 °C) -- 85 18 96 % 5' 7\" (1.702 m) 194 lb 3.6 oz (88.1 kg)   10/04/21 1800 139/65 97.7 °F (36.5 °C) Oral 82 18 96 % -- --   10/04/21 1734 (!) 159/77 95.7 °F (35.4 °C) -- 83 20 96 % 5' 7\" (1.702 m) 202 lb 2.6 oz (91.7 kg)   10/04/21 1658 -- 97.9 °F (36.6 °C) -- -- -- -- -- --   10/04/21 1630 (!) 148/74 -- -- -- 9 (!) 89 % -- --   10/04/21 1600 (!) 148/81 -- -- 85 15 99 % -- --   10/04/21 1530 (!) 156/78 98.1 °F (36.7 °C) -- 86 18 -- -- --   10/04/21 1450 (!) 152/73 98.1 °F (36.7 °C) Oral 85 18 92 % -- --   10/04/21 1312 -- 97.6 °F (36.4 °C) Oral -- -- -- -- --   10/04/21 1307 -- -- -- -- -- -- 5' 7\" (1.702 m) 213 lb (96.6 kg)   10/04/21 1245 -- -- -- 85 -- 93 % -- --   10/04/21 1215 -- -- -- 84 14 90 % -- --   10/04/21 1200 (!) 140/72 -- -- -- -- 91 % -- --       Intake/Output Summary (Last 24 hours) at 10/5/2021 0941  Last data filed at 10/5/2021 0908  Gross per 24 hour   Intake 940 ml   Output 4650 ml   Net -3710 ml       Physical Exam  Vitals reviewed. Constitutional:       General: She is not in acute distress. Appearance: She is ill-appearing. Comments: Obese body habitus   HENT:      Head: Normocephalic and atraumatic. Right Ear: External ear normal.      Left Ear: External ear normal.      Mouth/Throat:      Mouth: Mucous membranes are not dry. Eyes:      General: No scleral icterus. Conjunctiva/sclera: Conjunctivae normal.   Neck:      Thyroid: No thyroid mass. Vascular: No JVD. Trachea: Trachea normal.   Cardiovascular:      Rate and Rhythm: Normal rate and regular rhythm. Pulmonary:      Effort: Respiratory distress (mild) present. Breath sounds: Rales present. Abdominal:      General: Bowel sounds are normal.      Palpations: Abdomen is soft. Musculoskeletal:         General: Swelling present. No deformity. Cervical back: Neck supple. Skin:     General: Skin is warm and dry. Neurological:      Mental Status: She is alert and oriented to person, place, and time. Mental status is at baseline. Psychiatric:         Mood and Affect: Mood normal.         Behavior: Behavior normal.       Vascular Access:   Hemodialysis catheter location:  RIGHT tunneled internal jugular .  Exit site demonstrates:  normal findings; no signs of bleeding, redness, tenderness or discharge. DATA:  Diagnostic tests reviewed for today's visit:    Recent Labs     10/04/21  1214 10/05/21  0237 10/05/21  0715   WBC 9.7  --  7.2   HCT 20.3* 26.4* 31.0*     --  230     Iron Saturation:  No components found for: PERCENTFE  FERRITIN:    Lab Results   Component Value Date    FERRITIN 112.0 08/28/2021     IRON:    Lab Results   Component Value Date    IRON 22 08/28/2021     TIBC:    Lab Results   Component Value Date    TIBC 184 08/28/2021       Recent Labs     10/04/21  1309 10/05/21  0715    137   K 5.3* 5.0    103   CO2 21 20*   BUN 50* 29*   CREATININE 5.0* 3.3*     Recent Labs     10/04/21  1309 10/05/21  0715   CALCIUM 8.4 8.8   PHOS  --  4.3     No results for input(s): PH, PCO2, PO2 in the last 72 hours. Invalid input(s): Curt Howard    ABG:  No results found for: PH, PCO2, PO2, HCO3, BE, THGB, TCO2, O2SAT  VBG:    Lab Results   Component Value Date    PHVEN 7.322 09/13/2021    QQR9RZW 34.3 02/23/2021    BEVEN -4.6 02/23/2021    S0QSGZQJ 100 02/23/2021           BELOW MENTIONED RADIOLOGY STUDY RESULTS REVIEWED BY ME:    IR TUNNELED CVC PLACE WO SQ PORT/PUMP > 5 YEARS    Result Date: 9/7/2021  PROCEDURE: FLUOROSCOPY GUIDED PLACEMENT OF A TUNNELED CATHETER. MODERATE CONSCIOUS SEDATION 9/7/2021. HISTORY: ORDERING SYSTEM PROVIDED HISTORY: Tunneled HD catheter TECHNOLOGIST PROVIDED HISTORY: Reason for exam:->Tunneled HD catheter How many lumens are being requested?->2 What side should this line be placed? ->Either What site is the preferred site? ->No preference Is the patient pregnant?->No SEDATION: Intravenous sedation was administered with continuous monitoring throughout the procedure. In measured doses, a total of intravenous , 0.5 mg intravenous Versed and 25 mcg intravenous fentanyl was administered. My total procedure time with moderate sedation was 15 minutes. ESTIMATED BLOOD LOSS: None. FLUOROSCOPY DOSE AND TYPE OR TIME AND EXPOSURES: 0.1 minute, 1 digital image. TECHNIQUE: Informed consent was obtained after a detailed explanation of the procedure including risks, benefits, and alternatives. Universal protocol was observed. The right neck and chest were prepped and draped in sterile fashion using maximum sterile barrier technique. Local anesthesia was achieved with lidocaine. The existing temporary catheter was utilized with wire placement to obtain length of the new tunneled catheter. A 0.035 guidewire was used to place a peel-away sheath. A chest wall incision was made and the catheter tubing was attached and tunneled subcutaneously to the prior venotomy site. The tunneled dialysis catheter tubing was 23 cm and advanced through the peel-away sheath under fluoroscopic guidance to the mid right atrium. The external catheter was sewn to the skin surface with 2 separate sutures, 2-0 silk. Both ports aspirated with easy blood return and were flushed easily with heparinized saline. The ports were locked with heparinized saline. The patient tolerated the procedure well and there were no immediate complications. Immediate digital image reveals accurate positioning of the catheter tip in the mid right atrium. FINDINGS: The tunneled catheter has a normal expected course and the catheter tip placement is in the mid right atrium. Successful fluoroscopy guided right IJ placement of the tunneled dialysis catheter. The catheter is available for immediate utilization.

## 2021-10-07 LAB
BLOOD BANK DISPENSE STATUS: NORMAL
BLOOD BANK DISPENSE STATUS: NORMAL
BLOOD BANK PRODUCT CODE: NORMAL
BLOOD BANK PRODUCT CODE: NORMAL
BPU ID: NORMAL
BPU ID: NORMAL
DESCRIPTION BLOOD BANK: NORMAL
DESCRIPTION BLOOD BANK: NORMAL

## 2021-10-09 LAB
BLOOD CULTURE, ROUTINE: NORMAL
CULTURE, BLOOD 2: NORMAL

## 2021-10-16 ENCOUNTER — APPOINTMENT (OUTPATIENT)
Dept: CT IMAGING | Age: 46
DRG: 425 | End: 2021-10-16
Payer: COMMERCIAL

## 2021-10-16 ENCOUNTER — HOSPITAL ENCOUNTER (INPATIENT)
Age: 46
LOS: 3 days | Discharge: HOME | DRG: 425 | End: 2021-10-19
Attending: EMERGENCY MEDICINE | Admitting: INTERNAL MEDICINE
Payer: COMMERCIAL

## 2021-10-16 ENCOUNTER — APPOINTMENT (OUTPATIENT)
Dept: GENERAL RADIOLOGY | Age: 46
DRG: 425 | End: 2021-10-16
Payer: COMMERCIAL

## 2021-10-16 DIAGNOSIS — J96.01 ACUTE RESPIRATORY FAILURE WITH HYPOXIA (HCC): Primary | ICD-10-CM

## 2021-10-16 DIAGNOSIS — R06.02 SHORTNESS OF BREATH: ICD-10-CM

## 2021-10-16 PROBLEM — Z20.822 SUSPECTED COVID-19 VIRUS INFECTION: Status: ACTIVE | Noted: 2021-10-16

## 2021-10-16 PROBLEM — J96.00 ACUTE RESPIRATORY FAILURE DUE TO COVID-19 (HCC): Status: ACTIVE | Noted: 2021-10-16

## 2021-10-16 PROBLEM — U07.1 ACUTE RESPIRATORY FAILURE DUE TO COVID-19 (HCC): Status: ACTIVE | Noted: 2021-10-16

## 2021-10-16 LAB
A/G RATIO: 1 (ref 1.1–2.2)
ALBUMIN SERPL-MCNC: 3.4 G/DL (ref 3.4–5)
ALP BLD-CCNC: 123 U/L (ref 40–129)
ALT SERPL-CCNC: 8 U/L (ref 10–40)
ANION GAP SERPL CALCULATED.3IONS-SCNC: 11 MMOL/L (ref 3–16)
AST SERPL-CCNC: 13 U/L (ref 15–37)
BASOPHILS ABSOLUTE: 0 K/UL (ref 0–0.2)
BASOPHILS RELATIVE PERCENT: 0.4 %
BILIRUB SERPL-MCNC: 0.4 MG/DL (ref 0–1)
BUN BLDV-MCNC: 18 MG/DL (ref 7–20)
CALCIUM SERPL-MCNC: 8.9 MG/DL (ref 8.3–10.6)
CHLORIDE BLD-SCNC: 103 MMOL/L (ref 99–110)
CO2: 25 MMOL/L (ref 21–32)
CREAT SERPL-MCNC: 3.6 MG/DL (ref 0.6–1.1)
EOSINOPHILS ABSOLUTE: 0.2 K/UL (ref 0–0.6)
EOSINOPHILS RELATIVE PERCENT: 1.5 %
GFR AFRICAN AMERICAN: 16
GFR NON-AFRICAN AMERICAN: 14
GLOBULIN: 3.4 G/DL
GLUCOSE BLD-MCNC: 114 MG/DL (ref 70–99)
GLUCOSE BLD-MCNC: 125 MG/DL (ref 70–99)
HCT VFR BLD CALC: 27.7 % (ref 36–48)
HEMOGLOBIN: 8.8 G/DL (ref 12–16)
LACTIC ACID, SEPSIS: 0.6 MMOL/L (ref 0.4–1.9)
LYMPHOCYTES ABSOLUTE: 1.6 K/UL (ref 1–5.1)
LYMPHOCYTES RELATIVE PERCENT: 15.1 %
MCH RBC QN AUTO: 29 PG (ref 26–34)
MCHC RBC AUTO-ENTMCNC: 31.8 G/DL (ref 31–36)
MCV RBC AUTO: 91.2 FL (ref 80–100)
MONOCYTES ABSOLUTE: 0.7 K/UL (ref 0–1.3)
MONOCYTES RELATIVE PERCENT: 7 %
NEUTROPHILS ABSOLUTE: 7.9 K/UL (ref 1.7–7.7)
NEUTROPHILS RELATIVE PERCENT: 76 %
PDW BLD-RTO: 17.4 % (ref 12.4–15.4)
PERFORMED ON: ABNORMAL
PLATELET # BLD: 282 K/UL (ref 135–450)
PMV BLD AUTO: 8 FL (ref 5–10.5)
POTASSIUM SERPL-SCNC: 4.5 MMOL/L (ref 3.5–5.1)
PRO-BNP: ABNORMAL PG/ML (ref 0–124)
RBC # BLD: 3.04 M/UL (ref 4–5.2)
SODIUM BLD-SCNC: 139 MMOL/L (ref 136–145)
TOTAL PROTEIN: 6.8 G/DL (ref 6.4–8.2)
TROPONIN: 0.3 NG/ML
WBC # BLD: 10.4 K/UL (ref 4–11)

## 2021-10-16 PROCEDURE — 71260 CT THORAX DX C+: CPT

## 2021-10-16 PROCEDURE — 83605 ASSAY OF LACTIC ACID: CPT

## 2021-10-16 PROCEDURE — 83036 HEMOGLOBIN GLYCOSYLATED A1C: CPT

## 2021-10-16 PROCEDURE — 71045 X-RAY EXAM CHEST 1 VIEW: CPT

## 2021-10-16 PROCEDURE — 83880 ASSAY OF NATRIURETIC PEPTIDE: CPT

## 2021-10-16 PROCEDURE — 6370000000 HC RX 637 (ALT 250 FOR IP): Performed by: NURSE PRACTITIONER

## 2021-10-16 PROCEDURE — 96374 THER/PROPH/DIAG INJ IV PUSH: CPT

## 2021-10-16 PROCEDURE — 93005 ELECTROCARDIOGRAM TRACING: CPT | Performed by: NURSE PRACTITIONER

## 2021-10-16 PROCEDURE — 87040 BLOOD CULTURE FOR BACTERIA: CPT

## 2021-10-16 PROCEDURE — 80053 COMPREHEN METABOLIC PANEL: CPT

## 2021-10-16 PROCEDURE — 94760 N-INVAS EAR/PLS OXIMETRY 1: CPT

## 2021-10-16 PROCEDURE — 1200000000 HC SEMI PRIVATE

## 2021-10-16 PROCEDURE — 36415 COLL VENOUS BLD VENIPUNCTURE: CPT

## 2021-10-16 PROCEDURE — 70360 X-RAY EXAM OF NECK: CPT

## 2021-10-16 PROCEDURE — 6360000002 HC RX W HCPCS: Performed by: NURSE PRACTITIONER

## 2021-10-16 PROCEDURE — 94640 AIRWAY INHALATION TREATMENT: CPT

## 2021-10-16 PROCEDURE — 6360000004 HC RX CONTRAST MEDICATION: Performed by: NURSE PRACTITIONER

## 2021-10-16 PROCEDURE — 84484 ASSAY OF TROPONIN QUANT: CPT

## 2021-10-16 PROCEDURE — 85025 COMPLETE CBC W/AUTO DIFF WBC: CPT

## 2021-10-16 PROCEDURE — U0003 INFECTIOUS AGENT DETECTION BY NUCLEIC ACID (DNA OR RNA); SEVERE ACUTE RESPIRATORY SYNDROME CORONAVIRUS 2 (SARS-COV-2) (CORONAVIRUS DISEASE [COVID-19]), AMPLIFIED PROBE TECHNIQUE, MAKING USE OF HIGH THROUGHPUT TECHNOLOGIES AS DESCRIBED BY CMS-2020-01-R: HCPCS

## 2021-10-16 PROCEDURE — U0005 INFEC AGEN DETEC AMPLI PROBE: HCPCS

## 2021-10-16 PROCEDURE — 99284 EMERGENCY DEPT VISIT MOD MDM: CPT

## 2021-10-16 RX ORDER — FUROSEMIDE 10 MG/ML
60 INJECTION INTRAMUSCULAR; INTRAVENOUS ONCE
Status: COMPLETED | OUTPATIENT
Start: 2021-10-16 | End: 2021-10-17

## 2021-10-16 RX ORDER — LORAZEPAM 2 MG/ML
1 INJECTION INTRAMUSCULAR ONCE
Status: COMPLETED | OUTPATIENT
Start: 2021-10-16 | End: 2021-10-16

## 2021-10-16 RX ORDER — ALBUTEROL SULFATE 2.5 MG/3ML
5 SOLUTION RESPIRATORY (INHALATION) ONCE
Status: COMPLETED | OUTPATIENT
Start: 2021-10-16 | End: 2021-10-16

## 2021-10-16 RX ORDER — GUAIFENESIN 600 MG/1
600 TABLET, EXTENDED RELEASE ORAL ONCE
Status: COMPLETED | OUTPATIENT
Start: 2021-10-16 | End: 2021-10-16

## 2021-10-16 RX ADMIN — GUAIFENESIN 600 MG: 600 TABLET, EXTENDED RELEASE ORAL at 19:50

## 2021-10-16 RX ADMIN — ALBUTEROL SULFATE 5 MG: 2.5 SOLUTION RESPIRATORY (INHALATION) at 19:25

## 2021-10-16 RX ADMIN — LORAZEPAM 1 MG: 2 INJECTION INTRAMUSCULAR; INTRAVENOUS at 20:54

## 2021-10-16 RX ADMIN — IOPAMIDOL 75 ML: 755 INJECTION, SOLUTION INTRAVENOUS at 21:43

## 2021-10-17 LAB
A/G RATIO: 1 (ref 1.1–2.2)
ALBUMIN SERPL-MCNC: 3.7 G/DL (ref 3.4–5)
ALP BLD-CCNC: 137 U/L (ref 40–129)
ALT SERPL-CCNC: 8 U/L (ref 10–40)
ANION GAP SERPL CALCULATED.3IONS-SCNC: 13 MMOL/L (ref 3–16)
AST SERPL-CCNC: 13 U/L (ref 15–37)
BASOPHILS ABSOLUTE: 0.1 K/UL (ref 0–0.2)
BASOPHILS RELATIVE PERCENT: 0.4 %
BILIRUB SERPL-MCNC: 0.4 MG/DL (ref 0–1)
BUN BLDV-MCNC: 22 MG/DL (ref 7–20)
C-REACTIVE PROTEIN: 8.8 MG/L (ref 0–5.1)
CALCIUM SERPL-MCNC: 9.3 MG/DL (ref 8.3–10.6)
CHLORIDE BLD-SCNC: 103 MMOL/L (ref 99–110)
CO2: 25 MMOL/L (ref 21–32)
CREAT SERPL-MCNC: 4.1 MG/DL (ref 0.6–1.1)
EKG ATRIAL RATE: 84 BPM
EKG DIAGNOSIS: NORMAL
EKG P AXIS: 35 DEGREES
EKG P-R INTERVAL: 144 MS
EKG Q-T INTERVAL: 388 MS
EKG QRS DURATION: 78 MS
EKG QTC CALCULATION (BAZETT): 458 MS
EKG R AXIS: 24 DEGREES
EKG T AXIS: 66 DEGREES
EKG VENTRICULAR RATE: 84 BPM
EOSINOPHILS ABSOLUTE: 0 K/UL (ref 0–0.6)
EOSINOPHILS RELATIVE PERCENT: 0.1 %
ESTIMATED AVERAGE GLUCOSE: 108.3 MG/DL
GFR AFRICAN AMERICAN: 14
GFR NON-AFRICAN AMERICAN: 12
GLOBULIN: 3.8 G/DL
GLUCOSE BLD-MCNC: 103 MG/DL (ref 70–99)
GLUCOSE BLD-MCNC: 136 MG/DL (ref 70–99)
GLUCOSE BLD-MCNC: 148 MG/DL (ref 70–99)
GLUCOSE BLD-MCNC: 164 MG/DL (ref 70–99)
GLUCOSE BLD-MCNC: 236 MG/DL (ref 70–99)
GLUCOSE BLD-MCNC: 255 MG/DL (ref 70–99)
HBA1C MFR BLD: 5.4 %
HCT VFR BLD CALC: 28.9 % (ref 36–48)
HEMOGLOBIN: 9.2 G/DL (ref 12–16)
LYMPHOCYTES ABSOLUTE: 0.7 K/UL (ref 1–5.1)
LYMPHOCYTES RELATIVE PERCENT: 3.9 %
MCH RBC QN AUTO: 28.6 PG (ref 26–34)
MCHC RBC AUTO-ENTMCNC: 32 G/DL (ref 31–36)
MCV RBC AUTO: 89.3 FL (ref 80–100)
MONOCYTES ABSOLUTE: 0.2 K/UL (ref 0–1.3)
MONOCYTES RELATIVE PERCENT: 1 %
NEUTROPHILS ABSOLUTE: 16.2 K/UL (ref 1.7–7.7)
NEUTROPHILS RELATIVE PERCENT: 94.6 %
PDW BLD-RTO: 17.1 % (ref 12.4–15.4)
PERFORMED ON: ABNORMAL
PLATELET # BLD: 320 K/UL (ref 135–450)
PMV BLD AUTO: 8.2 FL (ref 5–10.5)
POTASSIUM REFLEX MAGNESIUM: 5 MMOL/L (ref 3.5–5.1)
RBC # BLD: 3.24 M/UL (ref 4–5.2)
SARS-COV-2: NOT DETECTED
SODIUM BLD-SCNC: 141 MMOL/L (ref 136–145)
TOTAL PROTEIN: 7.5 G/DL (ref 6.4–8.2)
WBC # BLD: 17.1 K/UL (ref 4–11)

## 2021-10-17 PROCEDURE — 90935 HEMODIALYSIS ONE EVALUATION: CPT

## 2021-10-17 PROCEDURE — 85025 COMPLETE CBC W/AUTO DIFF WBC: CPT

## 2021-10-17 PROCEDURE — 80053 COMPREHEN METABOLIC PANEL: CPT

## 2021-10-17 PROCEDURE — 6370000000 HC RX 637 (ALT 250 FOR IP): Performed by: INTERNAL MEDICINE

## 2021-10-17 PROCEDURE — 6360000002 HC RX W HCPCS: Performed by: INTERNAL MEDICINE

## 2021-10-17 PROCEDURE — 5A1D70Z PERFORMANCE OF URINARY FILTRATION, INTERMITTENT, LESS THAN 6 HOURS PER DAY: ICD-10-PCS | Performed by: INTERNAL MEDICINE

## 2021-10-17 PROCEDURE — 94640 AIRWAY INHALATION TREATMENT: CPT

## 2021-10-17 PROCEDURE — 94761 N-INVAS EAR/PLS OXIMETRY MLT: CPT

## 2021-10-17 PROCEDURE — 2580000003 HC RX 258: Performed by: INTERNAL MEDICINE

## 2021-10-17 PROCEDURE — 93010 ELECTROCARDIOGRAM REPORT: CPT | Performed by: INTERNAL MEDICINE

## 2021-10-17 PROCEDURE — 36415 COLL VENOUS BLD VENIPUNCTURE: CPT

## 2021-10-17 PROCEDURE — 6360000002 HC RX W HCPCS: Performed by: NURSE PRACTITIONER

## 2021-10-17 PROCEDURE — 2700000000 HC OXYGEN THERAPY PER DAY

## 2021-10-17 PROCEDURE — 99223 1ST HOSP IP/OBS HIGH 75: CPT | Performed by: INTERNAL MEDICINE

## 2021-10-17 PROCEDURE — 1200000000 HC SEMI PRIVATE

## 2021-10-17 PROCEDURE — 86140 C-REACTIVE PROTEIN: CPT

## 2021-10-17 RX ORDER — SODIUM CHLORIDE FOR INHALATION 0.9 %
3 VIAL, NEBULIZER (ML) INHALATION ONCE
Status: COMPLETED | OUTPATIENT
Start: 2021-10-17 | End: 2021-10-17

## 2021-10-17 RX ORDER — LISINOPRIL 20 MG/1
40 TABLET ORAL DAILY
Status: DISCONTINUED | OUTPATIENT
Start: 2021-10-17 | End: 2021-10-19 | Stop reason: HOSPADM

## 2021-10-17 RX ORDER — INSULIN LISPRO 100 [IU]/ML
0-12 INJECTION, SOLUTION INTRAVENOUS; SUBCUTANEOUS
Status: DISCONTINUED | OUTPATIENT
Start: 2021-10-17 | End: 2021-10-19 | Stop reason: HOSPADM

## 2021-10-17 RX ORDER — SODIUM CHLORIDE FOR INHALATION 0.9 %
3 VIAL, NEBULIZER (ML) INHALATION EVERY 4 HOURS PRN
Status: DISCONTINUED | OUTPATIENT
Start: 2021-10-17 | End: 2021-10-19 | Stop reason: HOSPADM

## 2021-10-17 RX ORDER — SODIUM CHLORIDE 0.9 % (FLUSH) 0.9 %
10 SYRINGE (ML) INJECTION PRN
Status: DISCONTINUED | OUTPATIENT
Start: 2021-10-17 | End: 2021-10-19 | Stop reason: HOSPADM

## 2021-10-17 RX ORDER — LORAZEPAM 0.5 MG/1
0.5 TABLET ORAL EVERY 8 HOURS PRN
Status: ON HOLD | COMMUNITY
End: 2021-11-03

## 2021-10-17 RX ORDER — AMLODIPINE BESYLATE 5 MG/1
10 TABLET ORAL DAILY
Status: DISCONTINUED | OUTPATIENT
Start: 2021-10-17 | End: 2021-10-19 | Stop reason: HOSPADM

## 2021-10-17 RX ORDER — ATORVASTATIN CALCIUM 40 MG/1
40 TABLET, FILM COATED ORAL NIGHTLY
Status: DISCONTINUED | OUTPATIENT
Start: 2021-10-17 | End: 2021-10-19 | Stop reason: HOSPADM

## 2021-10-17 RX ORDER — SODIUM CHLORIDE 9 MG/ML
25 INJECTION, SOLUTION INTRAVENOUS PRN
Status: DISCONTINUED | OUTPATIENT
Start: 2021-10-17 | End: 2021-10-19 | Stop reason: HOSPADM

## 2021-10-17 RX ORDER — CLONAZEPAM 0.5 MG/1
0.5 TABLET ORAL 2 TIMES DAILY
Status: DISCONTINUED | OUTPATIENT
Start: 2021-10-17 | End: 2021-10-17

## 2021-10-17 RX ORDER — PREGABALIN 75 MG/1
75 CAPSULE ORAL NIGHTLY
Status: DISCONTINUED | OUTPATIENT
Start: 2021-10-17 | End: 2021-10-19 | Stop reason: HOSPADM

## 2021-10-17 RX ORDER — FUROSEMIDE 40 MG/1
80 TABLET ORAL DAILY
Status: DISCONTINUED | OUTPATIENT
Start: 2021-10-17 | End: 2021-10-19 | Stop reason: HOSPADM

## 2021-10-17 RX ORDER — SODIUM CHLORIDE 0.9 % (FLUSH) 0.9 %
10 SYRINGE (ML) INJECTION EVERY 12 HOURS SCHEDULED
Status: DISCONTINUED | OUTPATIENT
Start: 2021-10-17 | End: 2021-10-19 | Stop reason: HOSPADM

## 2021-10-17 RX ORDER — CLONIDINE 0.2 MG/24H
1 PATCH, EXTENDED RELEASE TRANSDERMAL WEEKLY
Status: DISCONTINUED | OUTPATIENT
Start: 2021-10-17 | End: 2021-10-19 | Stop reason: HOSPADM

## 2021-10-17 RX ORDER — ACETAMINOPHEN 325 MG/1
650 TABLET ORAL EVERY 6 HOURS PRN
Status: DISCONTINUED | OUTPATIENT
Start: 2021-10-17 | End: 2021-10-19 | Stop reason: HOSPADM

## 2021-10-17 RX ORDER — POTASSIUM CHLORIDE 20 MEQ/1
40 TABLET, EXTENDED RELEASE ORAL PRN
Status: DISCONTINUED | OUTPATIENT
Start: 2021-10-17 | End: 2021-10-17

## 2021-10-17 RX ORDER — HYDRALAZINE HYDROCHLORIDE 20 MG/ML
10 INJECTION INTRAMUSCULAR; INTRAVENOUS EVERY 6 HOURS PRN
Status: DISCONTINUED | OUTPATIENT
Start: 2021-10-17 | End: 2021-10-19 | Stop reason: HOSPADM

## 2021-10-17 RX ORDER — NICOTINE POLACRILEX 4 MG
15 LOZENGE BUCCAL PRN
Status: DISCONTINUED | OUTPATIENT
Start: 2021-10-17 | End: 2021-10-19 | Stop reason: HOSPADM

## 2021-10-17 RX ORDER — ONDANSETRON 2 MG/ML
4 INJECTION INTRAMUSCULAR; INTRAVENOUS EVERY 6 HOURS PRN
Status: DISCONTINUED | OUTPATIENT
Start: 2021-10-17 | End: 2021-10-19 | Stop reason: HOSPADM

## 2021-10-17 RX ORDER — FLUOXETINE HYDROCHLORIDE 20 MG/1
20 CAPSULE ORAL DAILY
Status: DISCONTINUED | OUTPATIENT
Start: 2021-10-17 | End: 2021-10-19 | Stop reason: HOSPADM

## 2021-10-17 RX ORDER — INSULIN LISPRO 100 [IU]/ML
0-6 INJECTION, SOLUTION INTRAVENOUS; SUBCUTANEOUS
Status: DISCONTINUED | OUTPATIENT
Start: 2021-10-17 | End: 2021-10-17 | Stop reason: ALTCHOICE

## 2021-10-17 RX ORDER — 0.9 % SODIUM CHLORIDE 0.9 %
500 INTRAVENOUS SOLUTION INTRAVENOUS PRN
Status: DISCONTINUED | OUTPATIENT
Start: 2021-10-17 | End: 2021-10-19 | Stop reason: HOSPADM

## 2021-10-17 RX ORDER — ASPIRIN 81 MG/1
81 TABLET ORAL DAILY
Status: DISCONTINUED | OUTPATIENT
Start: 2021-10-17 | End: 2021-10-19 | Stop reason: HOSPADM

## 2021-10-17 RX ORDER — DEXTROSE MONOHYDRATE 50 MG/ML
100 INJECTION, SOLUTION INTRAVENOUS PRN
Status: DISCONTINUED | OUTPATIENT
Start: 2021-10-17 | End: 2021-10-19 | Stop reason: HOSPADM

## 2021-10-17 RX ORDER — ACETAMINOPHEN 650 MG/1
650 SUPPOSITORY RECTAL EVERY 6 HOURS PRN
Status: DISCONTINUED | OUTPATIENT
Start: 2021-10-17 | End: 2021-10-19 | Stop reason: HOSPADM

## 2021-10-17 RX ORDER — DEXAMETHASONE SODIUM PHOSPHATE 10 MG/ML
6 INJECTION, SOLUTION INTRAMUSCULAR; INTRAVENOUS DAILY
Status: DISCONTINUED | OUTPATIENT
Start: 2021-10-17 | End: 2021-10-17

## 2021-10-17 RX ORDER — PROPRANOLOL HYDROCHLORIDE 80 MG/1
80 CAPSULE, EXTENDED RELEASE ORAL EVERY MORNING
Status: DISCONTINUED | OUTPATIENT
Start: 2021-10-17 | End: 2021-10-19 | Stop reason: HOSPADM

## 2021-10-17 RX ORDER — LORAZEPAM 0.5 MG/1
0.5 TABLET ORAL EVERY 8 HOURS PRN
Status: DISCONTINUED | OUTPATIENT
Start: 2021-10-17 | End: 2021-10-19 | Stop reason: HOSPADM

## 2021-10-17 RX ORDER — ONDANSETRON 4 MG/1
4 TABLET, ORALLY DISINTEGRATING ORAL EVERY 8 HOURS PRN
Status: DISCONTINUED | OUTPATIENT
Start: 2021-10-17 | End: 2021-10-19 | Stop reason: HOSPADM

## 2021-10-17 RX ORDER — DEXAMETHASONE SODIUM PHOSPHATE 10 MG/ML
6 INJECTION, SOLUTION INTRAMUSCULAR; INTRAVENOUS DAILY
Status: DISCONTINUED | OUTPATIENT
Start: 2021-10-17 | End: 2021-10-18

## 2021-10-17 RX ORDER — INSULIN LISPRO 100 [IU]/ML
0-6 INJECTION, SOLUTION INTRAVENOUS; SUBCUTANEOUS NIGHTLY
Status: DISCONTINUED | OUTPATIENT
Start: 2021-10-17 | End: 2021-10-19 | Stop reason: HOSPADM

## 2021-10-17 RX ORDER — POTASSIUM CHLORIDE 7.45 MG/ML
10 INJECTION INTRAVENOUS PRN
Status: DISCONTINUED | OUTPATIENT
Start: 2021-10-17 | End: 2021-10-17

## 2021-10-17 RX ORDER — SPIRONOLACTONE 25 MG/1
50 TABLET ORAL DAILY
Status: DISCONTINUED | OUTPATIENT
Start: 2021-10-17 | End: 2021-10-19 | Stop reason: HOSPADM

## 2021-10-17 RX ORDER — PREGABALIN 75 MG/1
75 CAPSULE ORAL ONCE
Status: DISCONTINUED | OUTPATIENT
Start: 2021-10-17 | End: 2021-10-19 | Stop reason: HOSPADM

## 2021-10-17 RX ORDER — DEXTROSE MONOHYDRATE 25 G/50ML
12.5 INJECTION, SOLUTION INTRAVENOUS PRN
Status: DISCONTINUED | OUTPATIENT
Start: 2021-10-17 | End: 2021-10-19 | Stop reason: HOSPADM

## 2021-10-17 RX ADMIN — RACEPINEPHRINE HYDROCHLORIDE 11.25 MG: 11.25 SOLUTION RESPIRATORY (INHALATION) at 05:30

## 2021-10-17 RX ADMIN — PROPRANOLOL HYDROCHLORIDE 80 MG: 80 CAPSULE, EXTENDED RELEASE ORAL at 11:12

## 2021-10-17 RX ADMIN — ISODIUM CHLORIDE 3 ML: 0.03 SOLUTION RESPIRATORY (INHALATION) at 01:03

## 2021-10-17 RX ADMIN — PREGABALIN 75 MG: 75 CAPSULE ORAL at 20:49

## 2021-10-17 RX ADMIN — INSULIN GLARGINE 20 UNITS: 100 INJECTION, SOLUTION SUBCUTANEOUS at 11:00

## 2021-10-17 RX ADMIN — Medication 10 ML: at 09:49

## 2021-10-17 RX ADMIN — DEXAMETHASONE SODIUM PHOSPHATE 6 MG: 10 INJECTION, SOLUTION INTRAMUSCULAR; INTRAVENOUS at 01:56

## 2021-10-17 RX ADMIN — RACEPINEPHRINE HYDROCHLORIDE 11.25 MG: 11.25 SOLUTION RESPIRATORY (INHALATION) at 01:49

## 2021-10-17 RX ADMIN — FUROSEMIDE 60 MG: 10 INJECTION, SOLUTION INTRAMUSCULAR; INTRAVENOUS at 00:19

## 2021-10-17 RX ADMIN — AMLODIPINE BESYLATE 10 MG: 5 TABLET ORAL at 09:48

## 2021-10-17 RX ADMIN — ENOXAPARIN SODIUM 30 MG: 100 INJECTION SUBCUTANEOUS at 09:48

## 2021-10-17 RX ADMIN — INSULIN LISPRO 2 UNITS: 100 INJECTION, SOLUTION INTRAVENOUS; SUBCUTANEOUS at 11:00

## 2021-10-17 RX ADMIN — FLUOXETINE 20 MG: 20 CAPSULE ORAL at 09:48

## 2021-10-17 RX ADMIN — SPIRONOLACTONE 50 MG: 25 TABLET ORAL at 09:50

## 2021-10-17 RX ADMIN — LORAZEPAM 0.5 MG: 0.5 TABLET ORAL at 01:46

## 2021-10-17 RX ADMIN — Medication 10 ML: at 20:49

## 2021-10-17 RX ADMIN — RACEPINEPHRINE HYDROCHLORIDE 11.25 MG: 11.25 SOLUTION RESPIRATORY (INHALATION) at 01:02

## 2021-10-17 RX ADMIN — FUROSEMIDE 80 MG: 40 TABLET ORAL at 09:48

## 2021-10-17 RX ADMIN — ISODIUM CHLORIDE 3 ML: 0.03 SOLUTION RESPIRATORY (INHALATION) at 01:50

## 2021-10-17 RX ADMIN — INSULIN LISPRO 6 UNITS: 100 INJECTION, SOLUTION INTRAVENOUS; SUBCUTANEOUS at 12:00

## 2021-10-17 RX ADMIN — LISINOPRIL 40 MG: 20 TABLET ORAL at 09:48

## 2021-10-17 RX ADMIN — INSULIN LISPRO 2 UNITS: 100 INJECTION, SOLUTION INTRAVENOUS; SUBCUTANEOUS at 20:50

## 2021-10-17 RX ADMIN — ATORVASTATIN CALCIUM 40 MG: 40 TABLET, FILM COATED ORAL at 20:48

## 2021-10-17 RX ADMIN — ASPIRIN 81 MG: 81 TABLET, COATED ORAL at 09:48

## 2021-10-17 RX ADMIN — TIOTROPIUM BROMIDE AND OLODATEROL 2 PUFF: 3.124; 2.736 SPRAY, METERED RESPIRATORY (INHALATION) at 09:28

## 2021-10-17 RX ADMIN — ISODIUM CHLORIDE 3 ML: 0.03 SOLUTION RESPIRATORY (INHALATION) at 05:30

## 2021-10-17 ASSESSMENT — ENCOUNTER SYMPTOMS
EYE PAIN: 0
NAUSEA: 0
DIARRHEA: 0
VOMITING: 0
SHORTNESS OF BREATH: 1
ABDOMINAL PAIN: 0
CHEST TIGHTNESS: 1
WHEEZING: 1
EYE REDNESS: 0

## 2021-10-17 ASSESSMENT — PAIN SCALES - GENERAL
PAINLEVEL_OUTOF10: 0

## 2021-10-17 NOTE — CONSULTS
[address]                     [facility], [address]                                  CONSULTATION    PATIENT NAME: Darcy Emmanuel                     :        1975  MED REC NO:   [id number]                          ROOM:       [id number]  ACCOUNT NO:   [de-identified]                           ADMIT DATE: 10/16/2021  PROVIDER:     Redd Smith MD    CONSULT DATE:  10/17/2021    CONSULTING PHYSICIAN:  Redd Smith MD    REFERRING PROVIDER:  Cristina Gray MD    REASON FOR CONSULTATION:  ESRD management. HISTORY OF PRESENT ILLNESS:  The patient is a 31-year-old female with  history of ESRD, on hemodialysis Monday,  and  at  [facility], has residual kidney function, history of  hypertension, hyperkalemia, anemia due to chronic kidney disease, CHF,  blindness, diabetes mellitus type 2, diabetic neuropathy,  hyperlipidemia, CVA, who presents to the hospital at this time secondary  to shortness of breath. Her COVID testing is pending. She had a CT  scan PE protocol, which was negative but did show increased vascular  congestion. Her systolic blood pressure has been ranging in the 150s to  170s range. She denies any chest pain. Her last hemodialysis was  Friday. No other complaints. PAST MEDICAL HISTORY:  ESRD, diabetes mellitus type 2, hypertension,  CHF, CVA, blindness in both eyes, diabetic neuropathy, migraine  headaches, hyperlipidemia. ALLERGIES:  Includes AMOXICILLIN, LEVOFLOXACIN, VANCOMYCIN and TAPE. MEDICATIONS PRIOR TO ADMISSION:  Lorazepam, furosemide, insulin,  lisinopril, Bevespi, clonidine patch, fluoxetine, liraglutide,  amlodipine, spironolactone, atorvastatin, propranolol, aspirin,  clonazepam, pregabalin. SOCIAL HISTORY:  Former smoker. No alcohol or drug abuse. FAMILY HISTORY:  Diabetes, hypertension, colon cancer and alcohol abuse. REVIEW OF SYSTEMS:  General:  Positive malaise.   Skin:  No skin rash,  itching or discharge. Neurologic:  No headaches or focal deficits. Cardiovascular:  No chest pain or palpitations. Pulmonary:  Positive  shortness of breath, episodes of coughing. No hemoptysis. GI:  No  abdominal pain. No nausea, no vomiting or diarrhea or constipation. Renal:  Denies any gross hematuria. Still has residual kidney function,  now on hemodialysis. Endocrine:  History of diabetes. No heat or cold  intolerance. ID:  No fever or chills. Musculoskeletal:  Denies active  joint pains. PHYSICAL EXAMINATION:  VITAL SIGNS:  Blood pressure 152/77, pulse 94, respiration 20,  saturating 100%. Weight listed at  89.2 kg. Urine output not recorded. GENERAL:  Appears well. HEENT:  Anicteric sclerae, clear conjunctivae. SKIN:  Anicteric, no rash. CHEST:  Rhonchi bilaterally. HEART:  Regular. ABDOMEN:  Soft, nontender. No rebound or involuntary guarding. EXTREMITIES:  Shows 1+ edema. LABORATORY DATA:  Sodium 141, potassium 5, chloride 103, bicarb 25, BUN  22, creatinine 4.1, glucose 148, calcium 9.3. CRP 8.8. ProBNP 34,680. Troponin 0.3. Albumin 3.7. WBC 17.1, hemoglobin 9.2, hematocrit 28.9,  platelet count 611,736. COVID-19 testing is pending. Blood cultures  are pending. CT scan of the chest, PE protocol shows no pulmonary embolism,  cardiomegaly with moderate pulmonary vascular congestive changes, with  moderate right and small left pleural effusion. Chest x-ray shows mild pulmonary vascular congestion, mild cardiomegaly. ASSESSMENT AND PLAN:  1. KINZA on CKD IV, with progression to ESRD. Underlying history of  diabetes and diabetic neuropathy. Probable diabetic nephropathy with  contrast exposure yesterday and residual kidney function plus persistent  shortness of breath. She will be set up for dialysis for three hours  today. We will attempt 3.5 liters of fluid removal.  She will go back  to her schedule tomorrow. Continue fluid removal challenge.   2. Hyperkalemia. 2K bath with hemodialysis. 3.  Anemia. Check iron studies. RIA with hemodialysis tomorrow. 4.  Hypertension. Follow with fluid removal challenge. Continue  outpatient regimen. 5.  Diabetes mellitus with possible diabetic nephropathy. Management as  per Medicine. 6.  Shortness of breath. COVID testing is pending. Follow with fluid  removal challenge. 7.  History of blindness. 8.  History of CHF. 9.  History of CVA, symptomatically stable. Thank you very much for this consult. We will follow with you.         Bennett Power MD    D: 10/17/2021 11:54:56       T: 10/17/2021 11:57:56     [email or web address]  Job#: [id number]     Doc#: [id number]    CC:

## 2021-10-17 NOTE — CONSULTS
PULMONARY AND CRITICAL CARE MEDICINE CONSULTATION NOTE    CONSULTING PHYSICIAN:  Carmina Garcia MD    ADMISSION DATE: 10/16/2021  ADMISSION DIAGNOSIS: Shortness of breath [R06.02]  Acute respiratory failure with hypoxia ([facility]) [J96.01]  Acute respiratory failure with hypoxemia ([address] [J96.01]    REASON FOR CONSULT:   Chief Complaint   Patient presents with    Shortness of Breath     SOB with audbile wheezes for over one week. Denies pulmonary hx or exposure to recent illness; quit smoking 8 weeks ago & has anxiety. Reports was in the ICU for unknown reason & was placed on vent in August 2021; seen on 10/4 & needed blood transfusion. DATE OF CONSULT: 10/16/2021    HISTORY OF PRESENT ILLNESS: 55y.o. year old female with a past medical history significant for CVA, recent ICU admission in August 2021 requiring mechanical ventilation, ESRD now on hemodialysis who presented to the hospital with shortness of breath. Patient reports that ever since she was intubated in August 2021 she has had a sore spot in her throat. She was told that she had a traumatic intubation and will take a while for her to heal.  She has been regularly undergoing hemodialysis. Quit smoking 8 weeks ago. At the time of interview she was undergoing hemodialysis. Reports that her breathing has improved. Apart from mild throat soreness which is usual for her she denies any new symptoms. Intermittent cough present which is productive of clear expectoration. No fevers, night sweats, chest pain reported. No hemoptysis. REVIEW OF SYSTEMS:     CONSTITUTIONAL SYMPTOMS: The patient denies fever, fatigue, night sweats, weight loss or weight gain. HEENT: No vision changes. No tinnitus, Denies sinus pain. No hoarseness, or dysphagia. NECK: Patient denies swelling in the neck. CARDIOVASCULAR: Denies chest pain, palpitation, syncope. RESPIRATORY: As per HPI.   GASTROINTESTINAL: Denies nausea, abdominal pain or change in bowel function. GENITOURINARY: Denies obstructive symptoms. No history of incontinence. BREASTS: No masses or lumps in the breasts. SKIN: No rashes or itching. MUSCULOSKELETAL: Denies weakness or bone pain. NEUROLOGICAL: No headaches or seizures. PSYCHIATRIC: Denies mood swings or depression. ENDOCRINE: Denies heat or cold intolerance or excessive thirst.  HEMATOLOGIC/LYMPHATIC: Denies easy bruising or lymph node swelling. ALLERGIC/IMMUNOLOGIC: No environmental allergies. PAST MEDICAL HISTORY:   Past Medical History:   Diagnosis Date    Blind in both eyes     [facility]bral artery occlusion [facility] infarction [facility]     CHF (congestive heart failure) (HCC)     Chronic kidney disease     Depression     Diabetes mellitus out of control ([facility]     Diabetes mellitus, type II ([facility]     2005    Diabetic neuropathy associated with type 2 diabetes mellitus ([facility]     Generalized headaches     Hypertension     Infertility     Insomnia     chronic vs lack of time spent to sleep    Migraine headache 11/09/2011    Mixed hyperlipidemia     Otitis media     h/o recurrent    Pelvic abscess in female 10/05/2013    Pneumonia     2004 approx.     Stroke ([facility] 08/27/2020    Stroke ([facility] 08/27/2021       PAST SURGICAL HISTORY:   Past Surgical History:   Procedure Laterality Date    CERVIX SURGERY      laser tx for dysplasia;1992    EYE SURGERY      FOOT SURGERY Right     FOOT SURGERY Bilateral     FOOT SURGERY Left     IR TUNNELED CATHETER PLACEMENT GREATER THAN 5 YEARS  9/7/2021    IR TUNNELED CATHETER PLACEMENT GREATER THAN 5 YEARS 9/7/2021 Michela Apley, MD [facility] SPECIAL PROCEDURES       SOCIAL HISTORY:   Social History     Tobacco Use    Smoking status: Former Smoker     Packs/day: 1.00     Types: Cigarettes    Smokeless tobacco: Never Used    Tobacco comment: Quit in August 2021   Vaping Use    Vaping Use: Never used   Substance Use Topics    Alcohol use: No     Comment: Very Rare    Drug use: No       FAMILY HISTORY:   Family History   Problem Relation Age of Onset    Diabetes Mother    Merna Vilchis Other Mother 79        Covid    Diabetes Father     High Blood Pressure Father     Colon Cancer Father     Diabetes Sister     Alcohol Abuse Maternal Grandfather     Diabetes Paternal Grandmother     Alcohol Abuse Paternal Grandfather     Diabetes Paternal Aunt     Diabetes Paternal Uncle        MEDICATIONS:     No current facility-administered medications on file prior to encounter. Current Outpatient Medications on File Prior to Encounter   Medication Sig Dispense Refill    LORazepam (ATIVAN) 0.5 MG tablet Take 0.5 mg by mouth every 8 hours as needed for Anxiety.       furosemide (LASIX) 80 MG tablet Take 80 mg by mouth daily      insulin glargine (LANTUS SOLOSTAR) 100 UNIT/ML injection pen Inject 20 Units into the skin every morning       lisinopril (PRINIVIL;ZESTRIL) 40 MG tablet Take 40 mg by mouth daily      glycopyrrolate-formoterol (BEVESPI AEROSPHERE) 9-4.8 MCG/ACT AERO Inhale 2 puffs into the lungs 2 times daily 10.7 g 2    cloNIDine (CATAPRES) 0.2 MG/24HR PTWK Place 1 patch onto the skin once a week 4 patch 2    FLUoxetine (PROZAC) 20 MG capsule Take 1 capsule by mouth daily 30 capsule 3    Dulaglutide 0.75 MG/0.5ML SOPN Inject 0.75 mg into the skin once a week 4 pen 1    amLODIPine (NORVASC) 10 MG tablet Take 1 tablet by mouth daily 30 tablet 1    spironolactone (ALDACTONE) 50 MG tablet Take 1 tablet by mouth daily 30 tablet 2    atorvastatin (LIPITOR) 40 MG tablet Take 1 tablet by mouth nightly 30 tablet 3    propranolol (INDERAL LA) 80 MG extended release capsule Take 1 capsule by mouth every morning 30 capsule 2    aspirin 81 MG EC tablet Take 1 tablet by mouth daily 30 tablet 3    insulin lispro, 1 Unit Dial, 100 UNIT/ML SOPN Inject 0-6 Units into the skin 3 times daily (with meals) **Corrective Low Dose Algorithm**  Glucose: Dose:               No Insulin  140-199 1 Unit  200-249 2 Units  250-299 3 Units  300-349 4 Units  350-399 5 Units  Over 399 6 Units 3 pen 0    clonazePAM (KLONOPIN) 0.5 MG tablet Take 0.5 mg by mouth 2 times daily as needed.  glucose (GLUTOSE) 40 % GEL Take 37.5 mLs by mouth as needed (low blood sugar) 45 g 1    Insulin Pen Needle 29G X 12.7MM MISC 1 each by Does not apply route daily 100 each 5    pregabalin (LYRICA) 75 MG capsule Take 1 capsule by mouth nightly for 7 days. 7 capsule 0        aspirin  81 mg Oral Daily    pregabalin  75 mg Oral Nightly    amLODIPine  10 mg Oral Daily    spironolactone  50 mg Oral Daily    atorvastatin  40 mg Oral Nightly    propranolol  80 mg Oral QAM    cloNIDine  1 patch TransDERmal Weekly    FLUoxetine  20 mg Oral Daily    tiotropium-olodaterol  2 puff Inhalation Daily    furosemide  80 mg Oral Daily    insulin glargine  20 Units SubCUTAneous QAM    lisinopril  40 mg Oral Daily    sodium chloride flush  10 mL IntraVENous 2 times per day    enoxaparin  30 mg SubCUTAneous Daily    insulin lispro  0-12 Units SubCUTAneous TID WC    insulin lispro  0-6 Units SubCUTAneous Nightly    dexamethasone  6 mg IntraVENous Daily    pregabalin  75 mg Oral Once      sodium chloride      dextrose       glucose, sodium chloride flush, sodium chloride, ondansetron **OR** ondansetron, acetaminophen **OR** acetaminophen, hydrALAZINE, sodium chloride, glucose, dextrose, glucagon (rDNA), dextrose, racepinephrine HCl, sodium chloride nebulizer, LORazepam      ALLERGIES:   Allergies as of 10/16/2021 - Fully Reviewed 10/16/2021   Allergen Reaction Noted    Amoxicillin Itching and Hives 07/16/2018    Levofloxacin Anaphylaxis 08/08/2011    Vancomycin Shortness Of Breath, Hives, and Anaphylaxis 04/21/2019    Tape [adhesive tape] Other (See Comments) 04/25/2020      OBJECTIVE:   height is 5' 7\" (1.702 m) and weight is 196 lb 9.6 oz (89.2 kg). Her oral temperature is 98 °F (36.7 °C).  Her blood pressure is 152/77 (abnormal) and her pulse is 94. Her respiration is 20 and oxygen saturation is 97%. No intake/output data recorded. PHYSICAL EXAM:    CONSTITUTIONAL: She is a 55y.o.-year-old who appears her stated age. She is alert and oriented x 3 and in no acute distress. HEENT: PERRLA, EOMI. No scleral icterus. No thrush, atraumatic, normocephalic. NECK: Supple, without cervical or supraclavicular lymphadenopathy:  CARDIOVASCULAR: S1 S2 RRR. Without murmer  RESPIRATORY & CHEST: Bilateral scattered crackles heard. Scattered wheezing heard. No stridor heard. GASTROINTESTINAL & ABDOMEN: Soft, nontender, positive bowel sounds in all quadrants, non-distended, without hepatosplenomegaly. GENITOURINARY: Deferred. MUSCULOSKELETAL: No tenderness to palpation of the axial skeleton. There is no clubbing. No cyanosis. No edema of the lower extremities. SKIN OF BODY: No rash or jaundice. PSYCHIATRIC EVALUATION: Normal affect. Patient answers questions appropriately. HEMATOLOGIC/LYMPHATIC/ IMMUNOLOGIC: No palpable lymphadenopathy. NEUROLOGIC: Alert and oriented x 3. Groslly non-focal. Motor strength is 5+/5 in all muscle groups. The patient has a normal sensorium globally. LABS:  Recent Labs     10/16/21  1924 10/17/21  0636   WBC 10.4 17.1*   HGB 8.8* 9.2*   HCT 27.7* 28.9*    320   ALT 8* 8*   AST 13* 13*    141   K 4.5 5.0    103   CREATININE 3.6* 4.1*   BUN 18 22*   CO2 25 25       Recent Labs     10/16/21  1924 10/17/21  0636   GLUCOSE 125* 164*   CALCIUM 8.9 9.3    141   K 4.5 5.0   CO2 25 25    103   BUN 18 22*   CREATININE 3.6* 4.1*       No results for input(s): PHART, PZB8JXV, PO2ART, PER0ZDS, U3SZGDDE, BEART, E1MZFTEZ in the last 72 hours.     Lab Results   Component Value Date    INR 1.00 09/07/2021    INR 0.99 08/27/2021    INR 0.91 08/27/2020    PROTIME 11.3 09/07/2021    PROTIME 11.2 08/27/2021    PROTIME 10.6 08/27/2020     No results found for: AMYLASE   Lab Results Component Value Date    LABA1C 5.4 10/16/2021     Lab Results   Component Value Date    .3 10/16/2021     No results found for: TSH, L0FCAXX, O0JCTIH, THYROIDAB, FT3, T4FREE  Lab Results   Component Value Date    CKTOTAL 25 (L) 09/13/2021    TROPONINI 0.30 (H) 10/16/2021      Lab Results   Component Value Date    CRP 8.8 (H) 10/17/2021      No results found for: BNP   Lab Results   Component Value Date    DDIMER 806 (H) 08/28/2021      Lab Results   Component Value Date    FERRITIN 112.0 08/28/2021      Lab Results   Component Value Date    LACTA 0.9 10/05/2021           IMAGING:    Narrative   EXAMINATION:   CTA OF THE CHEST 10/16/2021 8:43 pm           FINDINGS:   Pulmonary Arteries: Pulmonary arteries are adequately opacified for   evaluation. No evidence of intraluminal filling defect to suggest pulmonary   embolism.  Main pulmonary artery is normal in caliber.       Mediastinum: The heart is enlarged.  The thoracic aorta and proximal great   vessels are patent and of normal caliber. No pericardial effusion. No   enlarged or suspicious axillary, hilar, or mediastinal lymphadenopathy.  The   thyroid gland is mildly enlarged and somewhat heterogeneous.  The right   jugular vein hemodialysis catheter ends in the upper right atrium.       Lungs/pleura: The trachea and mainstem bronchi are widely patent.  There is   interlobular septal thickening with diffuse bilateral lower lobe airspace   disease and ground-glass opacity.  No cavitary lesions.  No discrete   pulmonary nodule or mass.  Moderate right and small left pleural effusions. No pericardial effusion.  No pneumothorax.       Soft Tissues/Bones: Degenerative changes are present throughout the thoracic   spine.       Upper Abdomen:  The visualized upper abdomen is unremarkable.           Impression   No evidence of pulmonary embolism or acute thoracic aortic abnormality.       Cardiomegaly with moderate pulmonary vascular congestive change, moderate right, and small left pleural effusions.  Minimal superimposed bibasilar   atelectasis.  No significant pericardial effusion.             IMPRESSION:     1. Acute respiratory distress  2. Acute hypoxic respiratory failure  3. Pulmonary edema  4. Bilateral pleural effusion  5. Fluid overload  6. End-stage renal disease on hemodialysis  7. COVID-19 rule out  8. Previous history of traumatic intubation  9. History of tobacco abuse      RECOMMENDATION:     1. Patient has presented to the hospital with acute respiratory distress and hypoxic respiratory failure  2. Chest imaging did not show any PE but did show pulmonary edema and bilateral pleural effusion. 3. I do not hear any stridor. Patient did have a traumatic intubation in August 2021 after which she has had occasional throat soreness. No active interventions needed at this time. 4. Undergoing hemodialysis with removal of 3 L of fluid. This should help. 5. Patient is saturating in high 90s. Titrate oxygen down to maintain SPO2 above 89%. 6. COVID-19 test is pending. Low suspicion. 7. Can continue with Decadron 6 mg IV daily. 8. Management of end-stage renal disease as per nephrology recommendations. 9. Advised to sleep in a prone/supine prone position. 10. Encourage incentive spirometry. Thank you for your consultation. We will continue to follow the patient. Ricky Roland MD  Pulmonary Critical Care and Sleep Medicine  10/16/2021, 3:50 PM    This note was completed using dragon medical speech recognition software. Grammatical errors, random word insertions, pronoun errors and incomplete sentences are occasional consequences of this technology due to software limitations. If there are questions or concerns about the content of this note of information contained within the body of this dictation they should be addressed with the provider for clarification.

## 2021-10-17 NOTE — PROGRESS NOTES
Patient arrived to the floor at [phone]. On 2LNC. Stridor noted to lung sounds. Respiratory notified. Dr. Apollo Weinstein notified. Orders for Racemic. Patient states, \"I am having a panic attack. \" Oxygen increased to [facility] Hospitalist paged to come assess patient due to patient still sounding stridorous. [id number] Hospitalist at [facility]. Orders for another dose of Racemic. Advised to give decadron and ativan now.

## 2021-10-17 NOTE — ED PROVIDER NOTES
This is an independent KANDY patient encounter. I am available for consultation if needed. I did not perform a face-to-face evaluation of this patient and was not asked to see the patient. I was not made aware of any details of the patient's H&P or medical decision making but was asked to review and document this EKG. See my interpretation of the EKG below. I shared my findings and interpretation with the KANDY for use in his/her independent management of this patient. See his/her note for details of the patient's history, physical, and all medical decision making.       The 12 lead EKG was interpreted by me as follows:  Rate: normal with a rate of 84  Rhythm: sinus  Axis: normal  Intervals: normal ID, narrow QRS, normal QTc  ST segments: no ST elevations or depressions  T waves: no abnormal inversions  Non-specific T wave changes: not present  Prior EKG comparison: EKG dated 10/4/21 is not significantly different          Matthew Garcia MD  10/16/21 2014

## 2021-10-17 NOTE — ED PROVIDER NOTES
I personally evaluated and examined the patient in conjunction with the APC and agree with the assessment, treatment plan and disposition of the patient has recorded by the APC. I reviewed pertinent nurse's notes, triage notes, vital signs, past medical history, family and social history, medications, and allergies. Complete review of systems was conducted by the mid-level provider and/or myself. Review of systems is negative except as documented in the history of present illness. EKG: Twelve-lead EKG was read and interpreted myself as normal sinus rhythm at a rate of 84 bpm, MI interval QRS QTC normal. Normal axis no acute ischemic findings. Cardiac monitor was read and interpreted by myself as normal sinus rhythm. FINAL IMPRESSION     1. Acute respiratory failure with hypoxia ([address]    2. Shortness of breath            Electronically signed by: Judi Mao M.D.            Yoandy Hampton MD  10/17/21 [id number]

## 2021-10-17 NOTE — PROGRESS NOTES
Patient refusing bed alarm despite education. Refusal of treatment form signed and placed in patient chart.

## 2021-10-17 NOTE — H&P
History and Physical  Dr. Jackie Osborne  10/17/2021    PCP: Austin Nogueira    Cc:   Chief Complaint   Patient presents with    Shortness of Breath     SOB with audbile wheezes for over one week. Denies pulmonary hx or exposure to recent illness; quit smoking 8 weeks ago & has anxiety. Reports was in the ICU for unknown reason & was placed on vent in August 2021; seen on 10/4 & needed blood transfusion. HPI:  Eriberto Mejía is a 55 y.o. female who has a past medical history of Blind in both eyes, Cerebral artery occlusion with cerebral infarction ([address] CHF (congestive heart failure) ([address] Chronic kidney disease, Depression, Diabetes mellitus out of control ([address] Diabetes mellitus, type II ([address] Diabetic neuropathy associated with type 2 diabetes mellitus ([address] Generalized headaches, Hypertension, Infertility, Insomnia, Migraine headache, Mixed hyperlipidemia, Otitis media, Pelvic abscess in female, Pneumonia, Stroke ([address] and Stroke ([address] Patient presents with Acute respiratory failure due to COVID-19 [facility]). HPI     Eriberto Mejía is a 55 y.o. female who presents to the emergency department complaining of shortness of breath with wheezing. Patient reports symptoms over the past 1 week. Reports that she quit smoking 8 weeks ago but is feeling very anxious.     Patient has history of intubation.     Hemodialysis patient, last session of dialysis was yesterday, has not missed dialysis. Given new o2 requirement and unknown vaccination status, covid test has been sent in ER    Pt is currently on high flow o2, 12L now at 50%  Overnight she did have some reports of stridor, case was discussed with nurse and respiratory therapist at that time, patient was given dose of racemic epinephrine, this seems to have helped. Pulmonary consultation was requested, it is pending at time of this dictation    Problem list of hospitalization thus far:   Active Hospital Problems    Diagnosis     Acute respiratory failure due to COVID-19 ([facility] [U07.1, J96.00]     Suspected COVID-19 virus infection [Z20.822]     ESRD (end stage renal disease) ([facility] [N18.6]     Acute respiratory failure with hypoxemia (HCC) [J96.01]     Type 2 diabetes mellitus, with long-term current use of insulin (HCC) [E11.9, Z79.4]     Mixed hyperlipidemia [E78.2]          Review of Systems: (1 system for EPF, 2-9 for detailed, 10+ for comprehensive)  Review of Systems   Constitutional: Negative for chills and fever. HENT: Negative for ear discharge and ear pain. Eyes: Negative for pain and redness. Respiratory: Positive for shortness of breath and wheezing. Cardiovascular: Negative for chest pain and leg swelling. Gastrointestinal: Negative for nausea and vomiting. Endocrine: Negative for polydipsia and polyphagia. Genitourinary: Negative for frequency and urgency. Musculoskeletal: Negative for joint swelling and neck pain. Allergic/Immunologic: Negative for food allergies. Neurological: Negative for dizziness and light-headedness. Hematological: Negative for adenopathy. Psychiatric/Behavioral: Negative for agitation and decreased concentration. Past Medical History:   Past Medical History:   Diagnosis Date    Blind in both eyes     Cerebral artery occlusion with cerebral infarction ([facility]     CH[facility]congest[facility] failure) [facility]     Chronic kidney disease     Depression     [facility] of [facility]     Diabetes mellitus, type II ([facility]     2005    Diabetic neuropathy associated with type 2 diabetes mellitus ([facility]     Generalized headaches     Hypertension     Infertility     Insomnia     chronic vs lack of time spent to sleep    Migraine headache 11/09/2011    Mixed hyperlipidemia     Otitis media     h/o recurrent    Pelvic abscess in female 10/05/2013    Pneumonia     2004 approx.     Stroke [facility]) 08/27/2020    Stroke ([facility] 08/27/2021       Past Surgical History:   Past Surgical History:   Procedure Laterality Date    CERVIX SURGERY      laser tx for dysplasia;1992    EYE SURGERY      FOOT SURGERY Right     FOOT SURGERY Bilateral     FOOT SURGERY Left     IR TUNNELED CATHETER PLACEMENT GREATER THAN 5 YEARS  9/7/2021    IR TUNNELED CATHETER PLACEMENT GREATER THAN 5 YEARS 9/7/2021 Marla Garduno MD [facility] SPECIAL PROCEDURES       Social History:   Social History     Tobacco History     Smoking Status  Former Smoker Smoking Frequency  1 pack/day Smoking Tobacco Type  Cigarettes    Smokeless Tobacco Use  Never Used    Tobacco Comment  Quit in August 2021          Alcohol History     Alcohol Use Status  No Comment  Very Rare          Drug Use     Drug Use Status  No          Sexual Activity     Sexually Active  Yes Partners  Male                Fam History:   Family History   Problem Relation Age of Onset    Diabetes Mother    [facility] Other Mother 79        Covid    Diabetes Father     High Blood Pressure Father     Colon Cancer Father     Diabetes Sister     Alcohol Abuse Maternal Grandfather     Diabetes Paternal Grandmother     Alcohol Abuse Paternal Grandfather     Diabetes Paternal Aunt     Diabetes Paternal Uncle        PFSH: The above PMHx, PSHx, SocHx, FamHx has been reviewed by myself. (1 area for detailed, 2-3 for comprehensive)      Code Status: Full Code    Meds - following list of home medications fromelectronic chart has been reviewed by myself  Prior to Admission medications    Medication Sig Start Date End Date Taking? Authorizing Provider   LORazepam (ATIVAN) 0.5 MG tablet Take 0.5 mg by mouth every 8 hours as needed for Anxiety.    Yes Historical Provider, MD   furosemide (LASIX) 80 MG tablet Take 80 mg by mouth daily   Yes Historical Provider, MD   insulin glargine (LANTUS SOLOSTAR) 100 UNIT/ML injection pen Inject 20 Units into the skin every morning    Yes Historical Provider, MD   lisinopril (PRINIVIL;ZESTRIL) 40 MG tablet Take 40 mg by mouth daily   Yes Historical Provider, MD   glycopyrrolate-formoterol (Dian Newness) 9-4.8 MCG/ACT AERO Inhale 2 puffs into the lungs 2 times daily 9/23/21  Yes Damon Mireles   cloNIDine (CATAPRES) 0.2 MG/24HR PTWK Place 1 patch onto the skin once a week 9/17/21  Yes Wade Echols MD   FLUoxetine (PROZAC) 20 MG capsule Take 1 capsule by mouth daily 9/13/21  Yes Wade Echols MD   Dulaglutide 0.75 MG/0.5ML SOPN Inject 0.75 mg into the skin once a week 7/8/21  Yes Damon Mireles   amLODIPine (NORVASC) 10 MG tablet Take 1 tablet by mouth daily 7/8/21  Yes Damon Mireles   spironolactone (ALDACTONE) 50 MG tablet Take 1 tablet by mouth daily 7/8/21  Yes Damon Mireles   atorvastatin (LIPITOR) 40 MG tablet Take 1 tablet by mouth nightly 7/8/21  Yes Damon Mireles   propranolol (INDERAL LA) 80 MG extended release capsule Take 1 capsule by mouth every morning 7/8/21  Yes Damon Mireles   aspirin 81 MG EC tablet Take 1 tablet by mouth daily 9/1/20  Yes Enrico Lake MD   insulin lispro, 1 Unit Dial, 100 UNIT/ML SOPN Inject 0-6 Units into the skin 3 times daily (with meals) **Corrective Low Dose Algorithm**  Glucose: Dose:               No Insulin  140-199 1 Unit  200-249 2 Units  250-299 3 Units  300-349 4 Units  350-399 5 Units  Over 399 6 Units 4/29/20  Yes Deepa Coates MD   clonazePAM (KLONOPIN) 0.5 MG tablet Take 0.5 mg by mouth 2 times daily as needed. 8/20/21   Historical Provider, MD   glucose (GLUTOSE) 40 % GEL Take 37.5 mLs by mouth as needed (low blood sugar) 9/13/21   Wade Echols MD   Insulin Pen Needle 29G X 12.7MM MISC 1 each by Does not apply route daily 7/8/21   Damon Mireles   pregabalin (LYRICA) 75 MG capsule Take 1 capsule by mouth nightly for 7 days.  8/31/20 10/4/21  Enrico Lake MD         Allergies   Allergen Reactions    Amoxicillin Itching and Hives     Tolerates cephalosporins  Patient tolerating cefazolin (ANCEF) as of October 11, 2018  Patient tolerating cefazolin (ANCEF) as of October 11, 2018  Tolerates cephalosporins  Patient tolerating cefazolin (ANCEF) as of October 11, 2018    Levofloxacin Anaphylaxis    Vancomycin Shortness Of Breath, Hives and Anaphylaxis    Tape [Adhesive Tape] Other (See Comments)     Paper tape turns skin bright red. Plastic tape okay. EXAM: (2-7 system for EPF/Detailed, ?8 for Comprehensive)  BP (!) 151/82   Pulse 86   Temp 97.6 °F (36.4 °C) (Oral)   Resp 20   Ht 5' 7\" (1.702 m)   Wt 196 lb 9.6 oz (89.2 kg)   LMP 10/14/2021   SpO2 92%   BMI 30.79 kg/m²   Constitutional: vitals as above: alert, appears stated age and cooperative  Head: Normocephalic, without obvious abnormality, atraumatic  Eyes:lids and lashes normal, conjunctivae and sclerae normal and pupils equal, round, reactive to light and accomodation  EMNT: external ears normal, lips normale  Neck: no adenopathy, supple, symmetrical, trachea midline and thyroid not enlarged, symmetric, no tenderness/mass/nodules   Respiratory: wheeezes bilat mostly expiratory this am. Moderate air movement  Cardiovascular: normal rate, regular rhythm, normal S1 and S2 and no carotid bruits  Gastrointestinal: soft, non-tender, non-distended, normal bowel sounds, no masses or organomegaly  Lymphatic:   Extremities: trace edema  Skin:No rashes or nodules noted.   Neurologic:    LABS:  Labs Reviewed   CBC WITH AUTO DIFFERENTIAL - Abnormal; Notable for the following components:       Result Value    RBC 3.04 (*)     Hemoglobin 8.8 (*)     Hematocrit 27.7 (*)     RDW 17.4 (*)     Neutrophils Absolute 7.9 (*)     All other components within normal limits    Narrative:     Performed at:  [facility] [address], [facility], [address]   Phone [phone]   COMPREHENSIVE METABOLIC PANEL - Abnormal; Notable for the following components:    Glucose 125 (*)     CREATININE 3.6 (*)     GFR Non- 14 (*)     GFR  16 (*) Albumin/Globulin Ratio 1.0 (*)     ALT 8 (*)     AST 13 (*)     All other components within normal limits    Narrative:     Performed at:  [facility] [address]   Phone [phone]   TROPONIN - Abnormal; Notable for the following components:    Troponin 0.30 (*)     All other components within normal limits    Narrative:     Performed at:  [facility] [address]   Phone [phone]  - Abnormal; Notable for the following components:    Pro-BNP 34,680 (*)     All other components within normal limits    Narrative:     Performed at:  [facility] [address]   Phone [phone]   COMPREHENSIVE METABOLIC PANEL W/ REFLEX TO MG FOR LOW K - Abnormal; Notable for the following components:    Glucose 164 (*)     BUN 22 (*)     CREATININE 4.1 (*)     GFR Non- 12 (*)     GFR African American 14 (*)     Albumin/Globulin Ratio 1.0 (*)     Alkaline Phosphatase 137 (*)     ALT 8 (*)     AST 13 (*)     All other components within normal limits    Narrative:     Performed at:  [facility] [address] [employer] [address]   Phone [phone]   CBC WITH AUTO DIFFERENTIAL - Abnormal; Notable for the following components:    WBC 17.1 (*)     RBC 3.24 (*)     Hemoglobin 9.2 (*)     Hematocrit 28.9 (*)     RDW 17.1 (*)     Neutrophils Absolute 16.2 (*)     Lymphocytes Absolute 0.7 (*)     All other components within normal limits    Narrative:     Performed at:  [facility] [address] [employer] [address] [employer]   Phone [phone]   C-REACTIVE PROTEIN - Abnormal; Notable for the following components:    CRP 8.8 (*)     All other components within normal limits    Narrative:     Performed at:  [facility]  [address] [facility]   Phone [phone]   POCT GLUCOSE - Abnormal; Notable for the following components:    POC Glucose 114 (*)     All other components within normal limits    Narrative:     Performed at:  [facility]  [address] [facility]  [place], [address] [place] [address]   Phone [phone]   POCT GLUCOSE - Abnormal; Notable for the following components:    POC Glucose 103 (*)     All other components within normal limits    Narrative:     Performed at:  [facility]  [address]  [place], [facility] [address] [employer]   Phone [phone]   CULTURE, BLOOD 1   CULTURE, BLOOD 2   LACTATE, SEPSIS    Narrative:     Performed at:  [facility]  [address] [facility]  [place], [facility] [address] [employer]   Phone [phone]   POCT GLUCOSE   POCT GLUCOSE   POCT GLUCOSE         IMAGING:  Imaging results from the ER have been reviewed in the computerized chart. XR NECK SOFT TISSUE    Result Date: 10/16/2021  EXAMINATION: TWO XRAY VIEWS OF THE NECK SOFT TISSUES 10/16/2021 10:32 pm COMPARISON: None. HISTORY: ORDERING SYSTEM PROVIDED HISTORY: stridor TECHNOLOGIST PROVIDED HISTORY: Reason for exam:->stridor FINDINGS: The upper airway is patent. The retropharyngeal soft tissues and epiglottis are not thickened. There is narrowing at the glottis. No radiopaque foreign bodies are seen over the airway. Retropharyngeal soft tissues and epiglottis appear normal.  There is some narrowing at the glottis. XR CHEST PORTABLE    Result Date: 10/16/2021  EXAMINATION: ONE XRAY VIEW OF THE CHEST 10/16/2021 7:06 pm COMPARISON: None. HISTORY: ORDERING SYSTEM PROVIDED HISTORY: SOB TECHNOLOGIST PROVIDED HISTORY: Reason for exam:->SOB Reason for Exam: SOB Acuity: Acute Type of Exam: Initial FINDINGS: There is a right subclavian catheter with its tip in the right atrium. The cardiomediastinal silhouette is mildly enlarged. There is mild pulmonary vascular congestion. There is no pleural effusion. There is no pneumothorax. There is no acute osseous abnormality. Mild pulmonary vascular congestion. Mild cardiomegaly. XR CHEST PORTABLE    Result Date: 10/6/2021  EXAMINATION: ONE XRAY VIEW OF THE CHEST 10/4/2021 2:33 pm COMPARISON: 10/03/2021 HISTORY: ORDERING SYSTEM PROVIDED HISTORY: SOB TECHNOLOGIST PROVIDED HISTORY: Reason for exam:->SOB Reason for Exam: Shortness of breath. Acuity: Acute Type of Exam: Initial FINDINGS: Worsening multifocal airspace consolidation in both lungs. Findings suggest worsening pneumonia. Removal of ET tube since prior. A right central line with tips in the SVC. No pneumothorax. No pleural effusion. Cardiomediastinal silhouette and bony thorax are unchanged. Extensive multifocal airspace disease in both lungs worsening since prior examination suggesting worsening pneumonia. CT CHEST PULMONARY EMBOLISM W CONTRAST    Result Date: 10/16/2021  EXAMINATION: CTA OF THE CHEST 10/16/2021 8:43 pm TECHNIQUE: CTA of the chest was performed after the administration of intravenous contrast.  Multiplanar reformatted images are provided for review. MIP images are provided for review. Dose modulation, iterative reconstruction, and/or weight based adjustment of the mA/kV was utilized to reduce the radiation dose to as low as reasonably achievable. COMPARISON: None. HISTORY: ORDERING SYSTEM PROVIDED HISTORY: pe- dialysis patient - okay to contrast TECHNOLOGIST PROVIDED HISTORY: Reason for exam:->pe- dialysis patient - okay to contrast Decision Support Exception - unselect if not a suspected or confirmed emergency medical condition->Emergency Medical Condition (MA) Reason for Exam: Shortness of Breath (SOB with audbile wheezes for over one week. Denies pulmonary hx or exposure to recent illness; quit smoking 8 weeks ago & has anxiety.   Reports was in the ICU for unknown reason & was placed on vent in August 2021; seen on 10/4 & needed blood transfusion.  ) FINDINGS: Pulmonary Arteries: Pulmonary arteries are adequately opacified for evaluation. No evidence of intraluminal filling defect to suggest pulmonary embolism. Main pulmonary artery is normal in caliber. Mediastinum: The heart is enlarged. The thoracic aorta and proximal great vessels are patent and of normal caliber. No pericardial effusion. No enlarged or suspicious axillary, hilar, or mediastinal lymphadenopathy. The thyroid gland is mildly enlarged and somewhat heterogeneous. The right jugular vein hemodialysis catheter ends in the upper right atrium. Lungs/pleura: The trachea and mainstem bronchi are widely patent. There is interlobular septal thickening with diffuse bilateral lower lobe airspace disease and ground-glass opacity. No cavitary lesions. No discrete pulmonary nodule or mass. Moderate right and small left pleural effusions. No pericardial effusion. No pneumothorax. Soft Tissues/Bones: Degenerative changes are present throughout the thoracic spine. Upper Abdomen: The visualized upper abdomen is unremarkable. No evidence of pulmonary embolism or acute thoracic aortic abnormality. Cardiomegaly with moderate pulmonary vascular congestive change, moderate right, and small left pleural effusions. Minimal superimposed bibasilar atelectasis. No significant pericardial effusion. EKG: from Er, interpreted by self, normal sinus rhythm at 84. No acute ST elevation is seen. EKG in old chart is reviewed, dated 10/4/2021, this shows similar rhythm and identical rate at 84. MEDICAL DECISION MAKING:    Principal Problem:    Acute respiratory failure due to suspected COVID-19 ([facility] -New Problem to me. Patient with acute respiratory failure. Now requiring 12 L of 50%.   Some question of stridor overnight, though better now with breathing treatments  Plan: Continue on high flow oxygen, pulmonology has been asked to see patient. Can continue epinephrine as needed. IV Decadron ordered for suspected Covid infection. Maintain in droplet plus isolation  Active Problems:    Type 2 diabetes mellitus, with long-term current use of insulin ([facility])-Established problem. Stable. Fingerstick glucose okay at this time  Plan: Patient placed on controlled carbohydrate diet. Fingerstick sugars to be checked to monitor for both hypoglycemia as well as hyperglycemia. Sliding scale insulin ordered. Glucagon and dextrose ordered for hypoglycemia. Patient will be continued on home medications. Hemoglobin a1c to be ordered to assess efficacy of therapy. Mixed hyperlipidemia  Plan: Continue on statin. Monitor for myalgias    ESRD (end stage renal disease) ([facility])-Established problem. Stable. Patient states she has been compliant with hemodialysis treatments and has not missed any  Plan: Notify her nephrologist of admission, maintain on regular hemodialysis schedule    Acute respiratory failure with hypoxemia ([facility]    Suspected COVID-19 virus infection-New Problem to me. Given her presentation and high oxygen need and nonvaccinated status, Covid infection is certainly likely  Plan: Covid swab sent in emergency room. Pulmonary consultation requested. IV Decadron started. Await swab result          Diagnoses as listed above, designated as new or established and plan outlined for each. Data Reviewed:   (1) Lab tests were reviewed or ordered. (1) Radiology tests were reviewed or ordered. (1) Medical test (Echo, EKG, PFT/ashley) were ordered. (1)History was not obtained from someone other than patient  (1) Old records  were reviewed - see HPI/MDM for pertinent details if review done. (2) Case wasdiscussed with another health care provider: triny PRIDE NP  (2) Imaging was viewed by myself. (2) EKG  was viewed by myself.        The patient isbeing placed in inpatient status with the expectation of requiring a hospital stay spanning at least

## 2021-10-17 NOTE — CONSULTS
Renal Consult  Full Note Dictated [id number]  A/P  1. ESRD- HD MWF at [facility]. HD today with contrast exposure and volume overload. 2.   Hyperkalemia  3. Anemia  4. HTN  5.   DM with probably Nephropathy  6.    SOB    Thank you

## 2021-10-17 NOTE — ED PROVIDER NOTES
[address] [facility]        Pt Name: David Kramer  MRN: [id number]  [place] 1975  Date of evaluation: 10/16/2021  Provider: RIVERA Rucker CNP  PCP: Mar Summers  Note Started: 12:00 AM EDT        I have seen and evaluated this patient with my supervising physician Shania Keane MD.    [address] [place] [address]       Chief Complaint   Patient presents with    Shortness of Breath     SOB with audbile wheezes for over one week. Denies pulmonary hx or exposure to recent illness; quit smoking 8 weeks ago & has anxiety. Reports was in the ICU for unknown reason & was placed on vent in August 2021; seen on 10/4 & needed blood transfusion. HISTORY OF PRESENT ILLNESS   (Location, Timing/Onset, Context/Setting, Quality, Duration, Modifying Factors, Severity, Associated Signs and Symptoms)  Note limiting factors. Chief Complaint: wheezing and sob     David Kramer is a 55 y.o. female who presents to the emergency department complaining of shortness of breath with wheezing. Patient reports symptoms over the past 1 week. Reports that she quit smoking 8 weeks ago but is feeling very anxious. Patient has history of intubation. Hemodialysis patient, last session of dialysis was yesterday, has not missed dialysis. Denies any headache, fever, lightheadedness, dizziness, visual disturbances. No neck or back pain. No abdominal pain, nausea, vomiting, diarrhea, constipation, or dysuria. No rash. Nursing Notes were all reviewed and agreed with or any disagreements were addressed in the HPI. REVIEW OF SYSTEMS    (2-9 systems for level 4, 10 or more for level 5)     Review of Systems   Constitutional: Positive for fatigue. Negative for activity change, chills and fever. Respiratory: Positive for chest tightness, shortness of breath and wheezing. Cardiovascular: Negative for chest pain.    Gastrointestinal: Negative for abdominal pain, diarrhea, nausea and vomiting. Genitourinary: Negative for dysuria. All other systems reviewed and are negative. Positives and Pertinent negatives as per HPI. Except as noted above in the ROS, all other systems were reviewed and negative. PAST MEDICAL HISTORY     Past Medical History:   Diagnosis Date    Blind in both eyes     [facility]bral [facility] occlusion [facility]     CHF (congestive heart failure) (HCC)     Chronic kidney disease     Depression     Diabetes mellitus out of control ([facility]     Diabetes mellitus, type II ([facility]     2005    Diabetic neuropathy associated with type 2 diabetes mellitus ([facility]     Generalized headaches     Hypertension     Infertility     Insomnia     chronic vs lack of time spent to sleep    Migraine headache 11/09/2011    Mixed hyperlipidemia     Otitis media     h/o recurrent    Pelvic abscess in female 10/05/2013    Pneumonia     2004 approx.  [facility]roke ([facility] 08/27/2020    Stroke ([facility] 08/27/2021         SURGICAL HISTORY     Past Surgical History:   Procedure Laterality Date    CERVIX SURGERY      laser tx for dysplasia;1992    EYE SURGERY      FOOT SURGERY Right     FOOT SURGERY Bilateral     FOOT SURGERY Left     IR TUNNELED CATHETER PLACEMENT GREATER THAN 5 YEARS  9/7/2021    IR TUNNELED CATHETER PLACEMENT GREATER THAN 5 YEARS 9/7/2021 Mackenzie Acharya MD [facility] SPECIAL PROCEDURES         CURRENTMEDICATIONS       Previous Medications    AMLODIPINE (NORVASC) 10 MG TABLET    Take 1 tablet by mouth daily    ASPIRIN 81 MG EC TABLET    Take 1 tablet by mouth daily    ATORVASTATIN (LIPITOR) 40 MG TABLET    Take 1 tablet by mouth nightly    CLONAZEPAM (KLONOPIN) 0.5 MG TABLET    Take 0.5 mg by mouth 2 times daily as needed.      CLONIDINE (CATAPRES) 0.2 MG/24HR PTWK    Place 1 patch onto the skin once a week    DULAGLUTIDE 0.75 MG/0.5ML SOPN    Inject 0.75 mg into the skin once a week    FLUOXETINE (PROZAC) 20 MG SCREENINGS             PHYSICAL EXAM    (up to 7 for level 4, 8 or more for level 5)     ED Triage Vitals [10/16/21 1848]   BP Temp Temp src Pulse Resp SpO2 Height Weight   (!) 162/75 97.7 °F (36.5 °C) -- 92 18 98 % 5' 7\" (1.702 m) 195 lb (88.5 kg)       Physical Exam  Vitals and nursing note reviewed. Constitutional:       Appearance: She is well-developed. She is not diaphoretic. HENT:      Head: Normocephalic and atraumatic. Right Ear: External ear normal.      Left Ear: External ear normal.   Eyes:      General:         Right eye: No discharge. Left eye: No discharge. Neck:      Vascular: No JVD. Cardiovascular:      Rate and Rhythm: Normal rate and regular rhythm. Pulses: Normal pulses. Heart sounds: Normal heart sounds. Pulmonary:      Effort: No respiratory distress. Comments: Increased work of breathing, lower airways without wheezing. Intermittent stridor  Musculoskeletal:         General: Normal range of motion. Cervical back: Normal range of motion and neck supple. Skin:     General: Skin is warm and dry. Coloration: Skin is not pale. Neurological:      Mental Status: She is alert and oriented to person, place, and time. Psychiatric:         Mood and Affect: Mood is anxious.          Behavior: Behavior normal.         DIAGNOSTIC RESULTS   LABS:    Labs Reviewed   CBC WITH AUTO DIFFERENTIAL - Abnormal; Notable for the following components:       Result Value    RBC 3.04 (*)     Hemoglobin 8.8 (*)     Hematocrit 27.7 (*)     RDW 17.4 (*)     Neutrophils Absolute 7.9 (*)     All other components within normal limits    Narrative:     Performed at:  [facility] [address], [place], [address]   Phone [phone]   COMPREHENSIVE METABOLIC PANEL - Abnormal; Notable for the following components:    Glucose 125 (*)     CREATININE 3.6 (*)     GFR Non- 14 (*)     GFR  16 (*) Albumin/Globulin Ratio 1.0 (*)     ALT 8 (*)     AST 13 (*)     All other components within normal limits    Narrative:     Performed at:  [facility]  [address]   Phone [phone]   TROPONIN - Abnormal; Notable for the following components:    Troponin 0.30 (*)     All other components within normal limits    Narrative:     Performed at:  [facility]  [address]   Phone [phone]  - Abnormal; Notable for the following components:    Pro-BNP 34,680 (*)     All other components within normal limits    Narrative:     Performed at:  [facility] [address]   Phone [phone]   POCT GLUCOSE - Abnormal; Notable for the following components:    POC Glucose 114 (*)     All other components within normal limits    Narrative:     Performed at:  [facility]  [address]   Phone [phone]   CULTURE, BLOOD 1   CULTURE, BLOOD 2   LACTATE, SEPSIS    Narrative:     Performed at:  [facility] [address]   Phone [phone]       When ordered only abnormal lab results are displayed. All other labs were within normal range or not returned as of this dictation. EKG: When ordered, EKG's are interpreted by the Emergency Department Physician in the absence of a cardiologist.  Please see their note for interpretation of EKG.     RADIOLOGY:   Non-plain film images such as CT, Ultrasound and MRI are read by the radiologist. Plain radiographic images are visualized and preliminarily interpreted by the ED Provider with the below findings:        Interpretation per the Radiologist below, if available at the time of this note:    XR NECK SOFT TISSUE   Final Result   Retropharyngeal soft tissues and epiglottis appear normal.  There is some   narrowing at the glottis. CT CHEST PULMONARY EMBOLISM W CONTRAST   Final Result   No evidence of pulmonary embolism or acute thoracic aortic abnormality. Cardiomegaly with moderate pulmonary vascular congestive change, moderate   right, and small left pleural effusions. Minimal superimposed bibasilar   atelectasis. No significant pericardial effusion. XR CHEST PORTABLE   Final Result   Mild pulmonary vascular congestion. Mild cardiomegaly. XR NECK SOFT TISSUE    Result Date: 10/16/2021  EXAMINATION: TWO XRAY VIEWS OF THE NECK SOFT TISSUES 10/16/2021 10:32 pm COMPARISON: None. HISTORY: ORDERING SYSTEM PROVIDED HISTORY: stridor TECHNOLOGIST PROVIDED HISTORY: Reason for exam:->stridor FINDINGS: The upper airway is patent. The retropharyngeal soft tissues and epiglottis are not thickened. There is narrowing at the glottis. No radiopaque foreign bodies are seen over the airway. Retropharyngeal soft tissues and epiglottis appear normal.  There is some narrowing at the glottis. XR CHEST PORTABLE    Result Date: 10/16/2021  EXAMINATION: ONE XRAY VIEW OF THE CHEST 10/16/2021 7:06 pm COMPARISON: None. HISTORY: ORDERING SYSTEM PROVIDED HISTORY: SOB TECHNOLOGIST PROVIDED HISTORY: Reason for exam:->SOB Reason for Exam: SOB Acuity: Acute Type of Exam: Initial FINDINGS: There is a right subclavian catheter with its tip in the right atrium. The cardiomediastinal silhouette is mildly enlarged. There is mild pulmonary vascular congestion. There is no pleural effusion. There is no pneumothorax. There is no acute osseous abnormality. Mild pulmonary vascular congestion. Mild cardiomegaly.      CT CHEST PULMONARY EMBOLISM W CONTRAST    Result Date: 10/16/2021  EXAMINATION: CTA OF THE CHEST 10/16/2021 8:43 pm TECHNIQUE: CTA of the chest was performed after the administration of intravenous contrast.  Multiplanar reformatted images are provided for review. MIP images are provided for review. Dose modulation, iterative reconstruction, and/or weight based adjustment of the mA/kV was utilized to reduce the radiation dose to as low as reasonably achievable. COMPARISON: None. HISTORY: ORDERING SYSTEM PROVIDED HISTORY: pe- dialysis patient - okay to contrast TECHNOLOGIST PROVIDED HISTORY: Reason for exam:->pe- dialysis patient - okay to contrast Decision Support Exception - unselect if not a suspected or confirmed emergency medical condition->Emergency Medical Condition (MA) Reason for Exam: Shortness of Breath (SOB with audbile wheezes for over one week. Denies pulmonary hx or exposure to recent illness; quit smoking 8 weeks ago & has anxiety. Reports was in the ICU for unknown reason & was placed on vent in August 2021; seen on 10/4 & needed blood transfusion.  ) FINDINGS: Pulmonary Arteries: Pulmonary arteries are adequately opacified for evaluation. No evidence of intraluminal filling defect to suggest pulmonary embolism. Main pulmonary artery is normal in caliber. Mediastinum: The heart is enlarged. The thoracic aorta and proximal great vessels are patent and of normal caliber. No pericardial effusion. No enlarged or suspicious axillary, hilar, or mediastinal lymphadenopathy. The thyroid gland is mildly enlarged and somewhat heterogeneous. The right jugular vein hemodialysis catheter ends in the upper right atrium. Lungs/pleura: The trachea and mainstem bronchi are widely patent. There is interlobular septal thickening with diffuse bilateral lower lobe airspace disease and ground-glass opacity. No cavitary lesions. No discrete pulmonary nodule or mass. Moderate right and small left pleural effusions. No pericardial effusion. No pneumothorax. Soft Tissues/Bones: Degenerative changes are present throughout the thoracic spine. Upper Abdomen: The visualized upper abdomen is unremarkable.      No evidence of pulmonary embolism or acute thoracic aortic abnormality. Cardiomegaly with moderate pulmonary vascular congestive change, moderate right, and small left pleural effusions. Minimal superimposed bibasilar atelectasis. No significant pericardial effusion. PROCEDURES   Unless otherwise noted below, none     Procedures    CRITICAL CARE TIME   The total critical care time spent while evaluating and treating this patient was at least 38 minutes. This excludes time spent doing separately billable procedures. This includes time at the bedside, data interpretation, medication management, obtaining critical history from collateral sources if the patient is unable to provide it directly, and physician consultation. Specifics of interventions taken and potentially life-threatening diagnostic considerations are listed above in the medical decision making. CONSULTS:  IP CONSULT TO NEPHROLOGY  IP CONSULT TO NEPHROLOGY      EMERGENCY DEPARTMENT COURSE and DIFFERENTIAL DIAGNOSIS/MDM:   Vitals:    Vitals:    10/16/21 2300 10/16/21 2315 10/16/21 2330 10/16/21 2345   BP: (!) 177/108 (!) 161/100 (!) 156/73 (!) 153/97   Pulse: 82 80 79 80   Resp: 12 13 12 12   Temp:       SpO2: 94% 91% 92% 92%   Weight:       Height:           Patient was given the following medications:  Medications   furosemide (LASIX) injection 60 mg (has no administration in time range)   guaiFENesin (MUCINEX) extended release tablet 600 mg (600 mg Oral Given 10/16/21 1950)   albuterol (PROVENTIL) nebulizer solution 5 mg (5 mg Nebulization Given 10/16/21 1925)   LORazepam (ATIVAN) injection 1 mg (1 mg IntraVENous Given 10/16/21 2054)   iopamidol (ISOVUE-370) 76 % injection 75 mL (75 mLs IntraVENous Given 10/16/21 2143)           Briefly, this is a 59-year-old female who presents to the emergency department with 1 week history of shortness of breath and what she calls is wheezing. The patient does have what sounds like as intermittent stridor but this is not constant.     She did report feeling very anxious. She was given 1 mg of Ativan. Patient was noted to be hypoxic and placed on 2 L of oxygen. She is arousable without difficulty, awake and alert. CBC does show an anemia with a blood count of 8.8, she was recently transfused. Normal platelets, normal white blood cell count. CMP does show CKD with a creatinine of 3.6, as stated above, this patient is on hemodialysis. Troponin 0.30. BNP 34,680. Lactate 0.6. XR NECK SOFT TISSUE (Final result)  Result time 10/16/21 23:14:35  Final result by Cathy Castaneda (10/16/21 23:14:35)                Impression:    Retropharyngeal soft tissues and epiglottis appear normal.  There is some   narrowing at the glottis. CT CHEST PULMONARY EMBOLISM W CONTRAST (Final result)  Result time 10/16/21 22:22:42  Final result by Indra Shaikh MD (10/16/21 22:22:42)                Impression:    No evidence of pulmonary embolism or acute thoracic aortic abnormality. Cardiomegaly with moderate pulmonary vascular congestive change, moderate   right, and small left pleural effusions.  Minimal superimposed bibasilar   atelectasis.  No significant pericardial effusion. XR CHEST PORTABLE (Final result)  Result time 10/16/21 20:01:43  Final result by Venkat Lawson MD (10/16/21 20:01:43)                Impression:    Mild pulmonary vascular congestion. Mild cardiomegaly. This patient does make urine, she will be given 60 mg of Lasix. I did speak with her nephrologist, Dr. Citlalli Ferreira, he does recommend admission obviously for the hypoxemia, dialysis will be performed tomorrow as she received contrast dye tonight for CT to rule out PE. Patient and family are updated regarding plan of care. We will check a COVID-19 test as she is unvaccinated. The patient is admitted via Dr. Kulwant Dowling. FINAL IMPRESSION      1. Acute respiratory failure with hypoxia ([address]    2.  Shortness of breath          DISPOSITION/PLAN DISPOSITION Admitted 10/16/2021 11:42:57 PM      PATIENT REFERRED TO:  No follow-up provider specified.     DISCHARGE MEDICATIONS:  New Prescriptions    No medications on file       DISCONTINUED MEDICATIONS:  Discontinued Medications    No medications on file              (Please note that portions of this note were completed with a voice recognition program.  Efforts were made to edit the dictations but occasionally words are mis-transcribed.)    RIVERA Nvaa CNP (electronically signed)           RIVERA Nava CNP  10/17/21 0008

## 2021-10-17 NOTE — FLOWSHEET NOTE
Treatment time: 3 hours  Net UF: 3000 ml     Pre weight: 90.8 kg   Post weight: 87.8 kg  EDW: 90 kg     Access used: RIJ TDC  Access function: Good with  ml/min     Medications or blood products given: Heparin for catheter dwells     Regular outpatient schedule: MWF     Summary of response to treatment:      10/17/21 1350 10/17/21 1650   Vital Signs   BP (!) 166/87 (!) 153/72   Temp 97.2 °F (36.2 °C) 96.3 °F (35.7 °C)   Pulse 89 87   Resp 20 18   Weight 200 lb 2.8 oz (90.8 kg) 193 lb 9 oz (87.8 kg)   Percent Weight Change 1.82 -3.3   Pain Assessment   Pain Assessment 0-10 0-10   Pain Level 0 0   Post-Hemodialysis Assessment   Post-Treatment Procedures  --  Blood returned;Catheter capped, clamped and heparinized x 2 ports   Machine Disinfection Process  --  Acid/Vinegar Clean;Bleach; Exterior Machine Disinfection   Rinseback Volume (ml)  --  400 ml   Total Liters Processed (l/min)  --  60.7 l/min   Dialyzer Clearance  --  Moderately streaked   Duration of Treatment (minutes)  --  180 minutes   Heparin amount administered during treatment (units)  --  0 units   Hemodialysis Intake (ml)  --  400 ml   Hemodialysis Output (ml)  --  3400 ml   NET Removed (ml)  --  3000 ml   Tolerated Treatment  --  Good        Copy of dialysis treatment record placed in chart, to be scanned into EMR.  Report called to Ok Harding RN.

## 2021-10-17 NOTE — ED NOTES
Report given to [employer]. V/u and all questions answered.         Aviva Elizabeth RN  10/17/21 0002

## 2021-10-18 LAB
ALBUMIN SERPL-MCNC: 3.5 G/DL (ref 3.4–5)
ANION GAP SERPL CALCULATED.3IONS-SCNC: 11 MMOL/L (ref 3–16)
BASOPHILS ABSOLUTE: 0.2 K/UL (ref 0–0.2)
BASOPHILS RELATIVE PERCENT: 1 %
BUN BLDV-MCNC: 24 MG/DL (ref 7–20)
CALCIUM SERPL-MCNC: 8.6 MG/DL (ref 8.3–10.6)
CHLORIDE BLD-SCNC: 99 MMOL/L (ref 99–110)
CO2: 27 MMOL/L (ref 21–32)
CREAT SERPL-MCNC: 3.2 MG/DL (ref 0.6–1.1)
EOSINOPHILS ABSOLUTE: 0 K/UL (ref 0–0.6)
EOSINOPHILS RELATIVE PERCENT: 0 %
GFR AFRICAN AMERICAN: 19
GFR NON-AFRICAN AMERICAN: 16
GLUCOSE BLD-MCNC: 127 MG/DL (ref 70–99)
GLUCOSE BLD-MCNC: 135 MG/DL (ref 70–99)
GLUCOSE BLD-MCNC: 143 MG/DL (ref 70–99)
GLUCOSE BLD-MCNC: 95 MG/DL (ref 70–99)
HBV SURFACE AB TITR SER: <3.5 MIU/ML
HCT VFR BLD CALC: 24.6 % (ref 36–48)
HEMOGLOBIN: 8.1 G/DL (ref 12–16)
HEPATITIS B SURFACE ANTIGEN INTERPRETATION: NORMAL
LYMPHOCYTES ABSOLUTE: 2.1 K/UL (ref 1–5.1)
LYMPHOCYTES RELATIVE PERCENT: 13.6 %
MCH RBC QN AUTO: 28.2 PG (ref 26–34)
MCHC RBC AUTO-ENTMCNC: 32.8 G/DL (ref 31–36)
MCV RBC AUTO: 85.8 FL (ref 80–100)
MONOCYTES ABSOLUTE: 1.1 K/UL (ref 0–1.3)
MONOCYTES RELATIVE PERCENT: 6.8 %
NEUTROPHILS ABSOLUTE: 12.1 K/UL (ref 1.7–7.7)
NEUTROPHILS RELATIVE PERCENT: 78.6 %
PDW BLD-RTO: 16.9 % (ref 12.4–15.4)
PERFORMED ON: ABNORMAL
PERFORMED ON: ABNORMAL
PERFORMED ON: NORMAL
PHOSPHORUS: 4.1 MG/DL (ref 2.5–4.9)
PLATELET # BLD: 305 K/UL (ref 135–450)
PMV BLD AUTO: 7.9 FL (ref 5–10.5)
POTASSIUM SERPL-SCNC: 3.8 MMOL/L (ref 3.5–5.1)
RBC # BLD: 2.87 M/UL (ref 4–5.2)
SODIUM BLD-SCNC: 137 MMOL/L (ref 136–145)
WBC # BLD: 15.5 K/UL (ref 4–11)

## 2021-10-18 PROCEDURE — 2580000003 HC RX 258: Performed by: INTERNAL MEDICINE

## 2021-10-18 PROCEDURE — 6370000000 HC RX 637 (ALT 250 FOR IP): Performed by: INTERNAL MEDICINE

## 2021-10-18 PROCEDURE — 90935 HEMODIALYSIS ONE EVALUATION: CPT

## 2021-10-18 PROCEDURE — 6360000002 HC RX W HCPCS: Performed by: INTERNAL MEDICINE

## 2021-10-18 PROCEDURE — 1200000000 HC SEMI PRIVATE

## 2021-10-18 PROCEDURE — 94761 N-INVAS EAR/PLS OXIMETRY MLT: CPT

## 2021-10-18 PROCEDURE — 86704 HEP B CORE ANTIBODY TOTAL: CPT

## 2021-10-18 PROCEDURE — 85025 COMPLETE CBC W/AUTO DIFF WBC: CPT

## 2021-10-18 PROCEDURE — 80069 RENAL FUNCTION PANEL: CPT

## 2021-10-18 PROCEDURE — 86706 HEP B SURFACE ANTIBODY: CPT

## 2021-10-18 PROCEDURE — 99233 SBSQ HOSP IP/OBS HIGH 50: CPT | Performed by: INTERNAL MEDICINE

## 2021-10-18 PROCEDURE — 94640 AIRWAY INHALATION TREATMENT: CPT

## 2021-10-18 PROCEDURE — 36415 COLL VENOUS BLD VENIPUNCTURE: CPT

## 2021-10-18 PROCEDURE — 87340 HEPATITIS B SURFACE AG IA: CPT

## 2021-10-18 RX ORDER — HEPARIN SODIUM 1000 [USP'U]/ML
3600 INJECTION, SOLUTION INTRAVENOUS; SUBCUTANEOUS PRN
Status: DISCONTINUED | OUTPATIENT
Start: 2021-10-18 | End: 2021-10-19 | Stop reason: HOSPADM

## 2021-10-18 RX ADMIN — INSULIN LISPRO 1 UNITS: 100 INJECTION, SOLUTION INTRAVENOUS; SUBCUTANEOUS at 22:13

## 2021-10-18 RX ADMIN — FLUOXETINE 20 MG: 20 CAPSULE ORAL at 11:22

## 2021-10-18 RX ADMIN — SPIRONOLACTONE 50 MG: 25 TABLET ORAL at 11:23

## 2021-10-18 RX ADMIN — EPOETIN ALFA-EPBX 7000 UNITS: 10000 INJECTION, SOLUTION INTRAVENOUS; SUBCUTANEOUS at 09:17

## 2021-10-18 RX ADMIN — RACEPINEPHRINE HYDROCHLORIDE 11.25 MG: 11.25 SOLUTION RESPIRATORY (INHALATION) at 18:22

## 2021-10-18 RX ADMIN — LISINOPRIL 40 MG: 20 TABLET ORAL at 11:23

## 2021-10-18 RX ADMIN — FUROSEMIDE 80 MG: 40 TABLET ORAL at 11:23

## 2021-10-18 RX ADMIN — ATORVASTATIN CALCIUM 40 MG: 40 TABLET, FILM COATED ORAL at 22:11

## 2021-10-18 RX ADMIN — HEPARIN SODIUM 3600 UNITS: 1000 INJECTION INTRAVENOUS; SUBCUTANEOUS at 09:17

## 2021-10-18 RX ADMIN — INSULIN GLARGINE 20 UNITS: 100 INJECTION, SOLUTION SUBCUTANEOUS at 11:31

## 2021-10-18 RX ADMIN — ENOXAPARIN SODIUM 30 MG: 100 INJECTION SUBCUTANEOUS at 11:22

## 2021-10-18 RX ADMIN — ASPIRIN 81 MG: 81 TABLET, COATED ORAL at 11:23

## 2021-10-18 RX ADMIN — AMLODIPINE BESYLATE 10 MG: 5 TABLET ORAL at 11:23

## 2021-10-18 RX ADMIN — Medication 10 ML: at 22:12

## 2021-10-18 RX ADMIN — PROPRANOLOL HYDROCHLORIDE 80 MG: 80 CAPSULE, EXTENDED RELEASE ORAL at 11:22

## 2021-10-18 RX ADMIN — Medication 10 ML: at 11:29

## 2021-10-18 RX ADMIN — PREGABALIN 75 MG: 75 CAPSULE ORAL at 22:11

## 2021-10-18 RX ADMIN — TIOTROPIUM BROMIDE AND OLODATEROL 2 PUFF: 3.124; 2.736 SPRAY, METERED RESPIRATORY (INHALATION) at 13:43

## 2021-10-18 ASSESSMENT — ENCOUNTER SYMPTOMS
BLOOD IN STOOL: 0
VOMITING: 0
CHEST TIGHTNESS: 0
COUGH: 0
PHOTOPHOBIA: 0
FACIAL SWELLING: 0
EYE DISCHARGE: 0
NAUSEA: 0
ABDOMINAL PAIN: 0
EYE REDNESS: 0
DIARRHEA: 0
CONSTIPATION: 0
SHORTNESS OF BREATH: 0
ABDOMINAL DISTENTION: 0

## 2021-10-18 NOTE — FLOWSHEET NOTE
Treatment time: 3  Hrs   Net UF: 2.0 kg     Pre weight: 87.5 kg   Post weight: 85.5 kg   EDW: 90.0 kg changed to 83.0 kg per Dr. Levi Le    Access used: R CVC   Access function: Well      Medications or blood products given: Epogen     Regular outpatient schedule: MWF     Summary of response to treatment: Pt tolerated tx well. EDW changed per Dr. Levi Le; clinic called and informed of new EDW. Copy of dialysis treatment record placed in chart, to be scanned into EMR.        10/18/21 1109   Post-Hemodialysis Assessment   Total Liters Processed (l/min) 69.1 l/min   Dialyzer Clearance Lightly streaked   Duration of Treatment (minutes) 180 minutes   Hemodialysis Intake (ml) 400 ml   Hemodialysis Output (ml) 2400 ml   NET Removed (ml) 2000 ml   Tolerated Treatment Good

## 2021-10-18 NOTE — PROGRESS NOTES
[facility] Note    Patient Active Problem List   Diagnosis    Type 2 diabetes mellitus, with long-term current use of insulin (HCC)    Mixed hyperlipidemia    Migraine headache    Anemia    Diabetic foot infection ([address]    Pyogenic inflammation of bone ([address]    History of medication noncompliance    Osteomyelitis of left foot (HCC)    Nephrotic syndrome    Peripheral edema    Pulmonary edema    Right sided numbness    Tobacco dependence    [facility] disease [facility]CKD), stage III (moderate) [facility]    Chronic diastolic (congestive) heart failure ([facility]    History of CVA (cerebrovascular accident)    Arterial ischemic stroke, ICA, left, acute ([address]    HTN (hypertension), benign    DM (diabetes mellitus), secondary, uncontrolled, w/neurologic complic ([address]    Dyslipidemia    Smoker    Panic disorder    Isolated proteinuria    Acute on chronic diastolic heart failure (HCC)    Diabetic peripheral neuropathy (HCC)    Acute respiratory failure ([facility]    Depression    Abnormal gait    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, associated with type 2 diabetes mellitus ([address]    Cellulitis of right foot    Hidradenitis suppurativa    Hypocalcemia    Non-toxic multinodular goiter    Polyneuropathy due to type 2 diabetes mellitus (HCC)    Proliferative diabetic retinopathy associated with type 2 diabetes mellitus ([address]    ESRD (end stage renal disease) ([address]    Acute respiratory failure with hypoxemia ([address]    Acute respiratory failure due to COVID-19 ([address]    Suspected COVID-19 virus infection       Past Medical History:   has a past medical history of Blind in both eyes, Cerebral artery occlusion with cerebral infarction ([address] CHF (congestive heart failure) ([address] Chronic kidney disease, Depression, Diabetes mellitus out of control ([address] Diabetes mellitus, type II [facility]), Diabetic neuropathy associated with type 2 diabetes mellitus ([facility] Generalized headaches, Hypertension, Infertility, Insomnia, Migraine headache, Mixed hyperlipidemia, Otitis media, Pelvic abscess in female, Pneumonia, Stroke [facility]), and Stroke ([facility] Past Social History:   reports that she has quit smoking. Her smoking use included cigarettes. She smoked 1.00 pack per day. She has never used smokeless tobacco. She reports that she does not drink alcohol and does not use drugs. Subjective:  Seen on HD. BP higher. Breathing better. Had HD yesterday, 3L removed. Review of Systems   Constitutional: Negative for activity change, appetite change, chills, fatigue, fever and unexpected weight change. HENT: Negative for congestion and facial swelling. Eyes: Negative for photophobia, discharge and redness. Respiratory: Negative for cough, chest tightness and shortness of breath. Cardiovascular: Negative for chest pain, palpitations and leg swelling. Gastrointestinal: Negative for abdominal distention, abdominal pain, blood in stool, constipation, diarrhea, nausea and vomiting. Endocrine: Negative for cold intolerance, heat intolerance and polyuria. Genitourinary: Negative for decreased urine volume, difficulty urinating, flank pain and hematuria. Musculoskeletal: Negative for joint swelling and neck pain. Neurological: Negative for dizziness, seizures, syncope, speech difficulty, light-headedness and headaches. Hematological: Does not bruise/bleed easily. Psychiatric/Behavioral: Negative for agitation, confusion and hallucinations.        Objective:      /87   Pulse 85   Temp 98.3 °F (36.8 °C) (Oral)   Resp 18   Ht 5' 7\" (1.702 m)   Wt 193 lb 9 oz (87.8 kg)   LMP 10/14/2021   SpO2 98%   BMI 30.32 kg/m²     Wt Readings from Last 3 Encounters:   10/17/21 193 lb 9 oz (87.8 kg)   10/05/21 195 lb 8 oz (88.7 kg)   09/23/21 190 lb 9.6 oz (86.5 kg)       BP Readings from Last 3 Encounters:   10/18/21 137/87   10/05/21 (!) 157/73   09/23/21 130/84     Chest- rhonchi b/l  Heart-regular  Abd-soft  Ext- no edema    Labs  Hemoglobin   Date Value Ref Range Status   10/18/2021 8.1 (L) 12.0 - 16.0 g/dL Final     Hematocrit   Date Value Ref Range Status   10/18/2021 24.6 (L) 36.0 - 48.0 % Final     WBC   Date Value Ref Range Status   10/18/2021 15.5 (H) 4.0 - 11.0 K/uL Final     Platelets   Date Value Ref Range Status   10/18/2021 305 135 - 450 K/uL Final     Lab Results   Component Value Date    CREATININE 3.2 (H) 10/18/2021    BUN 24 (H) 10/18/2021     10/18/2021    K 3.8 10/18/2021    CL 99 10/18/2021    CO2 27 10/18/2021       Assessment/Plan:  1. KINZA on CKD IV, with progression to ESRD. Underlying history of  diabetes and diabetic neuropathy. Probable diabetic nephropathy with  contrast exposure Sat and residual kidney function plus persistent  shortness of breath. HD yesterday and today. Decrease EDW from 90kg to 83kg. Outpt unit will be informed. 2.  Hyperkalemia. Better. 3.  Anemia. RIA with hemodialysis. 4.  Hypertension. Follow with fluid removal challenge. Continue  outpatient regimen. 5.  Diabetes mellitus with possible diabetic nephropathy. Management as  per Medicine. 6.  Shortness of breath. COVID testing is negative. Better with fluid  removal challenge. 7.  History of blindness. 8.  History of CHF. 9.  History of CVA, symptomatically stable. 10.  H/O Tracheal Stenosis  11. Okay for D/C from renal perspective after HD once okay with Medicine.      Sp Calderon MD

## 2021-10-18 NOTE — PROGRESS NOTES
PULMONARY AND CRITICAL CARE MEDICINE PROGRESS NOTE      SUBJECTIVE: Patient is on room air now. Saturating well. Has done well after hemodialysis. REVIEW OF SYSTEMS:  CONSTITUTIONAL SYMPTOMS: The patient denies fever, fatigue, night sweats, weight loss or weight gain. HEENT: No vision changes. No tinnitus, Denies sinus pain. No hoarseness, or dysphagia. CARDIOVASCULAR: Denies chest pain, palpitation, syncope. RESPIRATORY: Denies shortness of breath or cough. GASTROINTESTINAL: Denies nausea, abdominal pain or change in bowel function. SKIN: No rashes or itching. MUSCULOSKELETAL: Denies weakness or bone pain. NEUROLOGICAL: No headaches or seizures.      MEDICATIONS:      epoetin yohana-epbx  10,000 Units IntraVENous Once per day on Mon Wed Fri    aspirin  81 mg Oral Daily    pregabalin  75 mg Oral Nightly    amLODIPine  10 mg Oral Daily    spironolactone  50 mg Oral Daily    atorvastatin  40 mg Oral Nightly    propranolol  80 mg Oral QAM    cloNIDine  1 patch TransDERmal Weekly    FLUoxetine  20 mg Oral Daily    tiotropium-olodaterol  2 puff Inhalation Daily    furosemide  80 mg Oral Daily    insulin glargine  20 Units SubCUTAneous QAM    lisinopril  40 mg Oral Daily    sodium chloride flush  10 mL IntraVENous 2 times per day    enoxaparin  30 mg SubCUTAneous Daily    insulin lispro  0-12 Units SubCUTAneous TID WC    insulin lispro  0-6 Units SubCUTAneous Nightly    pregabalin  75 mg Oral Once      sodium chloride      dextrose       heparin (porcine), glucose, sodium chloride flush, sodium chloride, ondansetron **OR** ondansetron, acetaminophen **OR** acetaminophen, hydrALAZINE, sodium chloride, glucose, dextrose, glucagon (rDNA), dextrose, racepinephrine HCl, sodium chloride nebulizer, LORazepam     ALLERGIES:   Allergies as of 10/16/2021 - Fully Reviewed 10/16/2021   Allergen Reaction Noted    Amoxicillin Itching and Hives 07/16/2018    Levofloxacin Anaphylaxis 08/08/2011    Vancomycin Shortness Of Breath, Hives, and Anaphylaxis 04/21/2019    Tape Shearon Drafts tape] Other (See Comments) 04/25/2020        OBJECTIVE:   height is 5' 7\" (1.702 m) and weight is 193 lb 9 oz (87.8 kg). Her oral temperature is 98.3 °F (36.8 °C). Her blood pressure is 137/87 and her pulse is 85. Her respiration is 18 and oxygen saturation is 96%. No intake/output data recorded. PHYSICAL EXAM:  CONSTITUTIONAL: She is a 55y.o.-year-old who appears her stated age. She is alert and oriented x 3 and in no acute distress. CARDIOVASCULAR: S1 S2 RRR. Without murmer  RESPIRATORY & CHEST: Lungs are clear to auscultation and percussion. No wheezing, no crackles. Good air movement  GASTROINTESTINAL & ABDOMEN: Soft, nontender, positive bowel sounds in all quadrants, non-distended, without hepatosplenomegaly. GENITOURINARY: Deferred. MUSCULOSKELETAL: No tenderness to palpation of the axial skeleton. There is no clubbing. No cyanosis. No edema of the lower extremities. SKIN OF BODY: No rash or jaundice. PSYCHIATRIC EVALUATION: Normal affect. Patient answers questions appropriately. HEMATOLOGIC/LYMPHATIC/ IMMUNOLOGIC: No palpable lymphadenopathy. NEUROLOGIC: Alert and oriented x 3. Groslly non-focal. Motor strength is 5+/5 in all muscle groups. The patient has a normal sensorium globally.       LABS:   Lab Results   Component Value Date    WBC 15.5 (H) 10/18/2021    HGB 8.1 (L) 10/18/2021    HCT 24.6 (L) 10/18/2021     10/18/2021    CHOL 164 02/24/2021    TRIG 319 (H) 08/31/2021    HDL 41 02/24/2021    LDLDIRECT 100 (H) 04/18/2011    ALT 8 (L) 10/17/2021    AST 13 (L) 10/17/2021     10/18/2021    K 3.8 10/18/2021    CL 99 10/18/2021    CREATININE 3.2 (H) 10/18/2021    BUN 24 (H) 10/18/2021    CO2 27 10/18/2021    INR 1.00 09/07/2021       Lab Results   Component Value Date    GLUCOSE 135 (H) 10/18/2021    CALCIUM 8.6 10/18/2021     10/18/2021    K 3.8 10/18/2021    CO2 27 10/18/2021    CL 99 10/18/2021    BUN 24 (H) 10/18/2021    CREATININE 3.2 (H) 10/18/2021       Lab Results   Component Value Date    GLUCOSE 135 (H) 10/18/2021    CALCIUM 8.6 10/18/2021     10/18/2021    K 3.8 10/18/2021    CO2 27 10/18/2021    CL 99 10/18/2021    BUN 24 (H) 10/18/2021    CREATININE 3.2 (H) 10/18/2021     IMAGING:   I reviewed the CT chest from 10/16/2021 and my interpretation is as follows. No PE noted. Pulmonary edema with bilateral pleural effusions. IMPRESSION:   1. Acute respiratory distress  2. Acute hypoxic respiratory failure  3. Pulmonary edema  4. Bilateral pleural effusion  5. Fluid overload  6. End-stage renal disease on hemodialysis  7. Previous history of traumatic intubation  8. History of tobacco abuse    RECOMMENDATION:   Patient presented with acute hypoxic respiratory failure secondary to pulmonary edema and bilateral pleural effusions. She is done well with hemodialysis. Pulmonary status have improved. Now maintaining good saturations on room air. COVID-19 PCR is negative. Patient has history of traumatic intubation in August 2021 after which she has occasional throat soreness. No acute interventions needed at this time. Pulmonary will sign off. Thank you for allowing us to participate in patient care. Suzanne Machuca MD  Pulmonary Critical Care and Sleep Medicine  10/18/2021, 3:09 PM    This note was completed using dragon medical speech recognition software. Grammatical errors, random word insertions, pronoun errors and incomplete sentences are occasional consequences of this technology due to software limitations. If there are questions or concerns about the content of this note of information contained within the body of this dictation they should be addressed with the provider for clarification.

## 2021-10-18 NOTE — PROGRESS NOTES
Progress Note - Dr. Tyshawn Interiano - Internal Medicine  PCP: Marcell Starkey [address] [facility] / [address] [facility]. [address] [facility]    Hospital Day: 2  Code Status: Full Code  Current Diet: ADULT DIET; Regular; 4 carb choices (60 gm/meal)        CC: follow up on medical issues    Subjective:   Olinda Rosas is a 55 y.o. female. She denies new problems    Oxygenation a little better  Now only on 8L  Heading to HD now      She denies chest pain, complains of shortness of breath, denies nausea,  denies emesis. 10 system Review of Systems is reviewed with patient, and pertinent positives are noted in HPI above . Otherwise, Review of systems is negative. I have reviewed the patient's medical and social history in detail and updated the computerized patient record. To recap: She  has a past medical history of Blind in both eyes, Cerebral artery occlusion with cerebral infarction ([address] CHF (congestive heart failure) ([address] Chronic kidney disease, Depression, Diabetes mellitus out of control ([address] Diabetes mellitus, type II ([address] Diabetic neuropathy associated with type 2 diabetes mellitus ([address] Generalized headaches, Hypertension, Infertility, Insomnia, Migraine headache, Mixed hyperlipidemia, Otitis media, Pelvic abscess in female, Pneumonia, Stroke [facility]), and Stroke ([facility] . She  has a past surgical history that includes Cervix surgery; eye surgery; Foot surgery (Right); Foot surgery (Bilateral); Foot surgery (Left); and IR TUNNELED CVC PLACE WO SQ PORT/PUMP > 5 YEARS (9/7/2021). . She  reports that she has quit smoking. Her smoking use included cigarettes. She smoked 1.00 pack per day. She has never used smokeless tobacco. She reports that she does not drink alcohol and does not use drugs. .        Active Hospital Problems    Diagnosis Date Noted    Acute respiratory failure due to COVID-19 ([facility] [U07.1, J96.00] 10/16/2021    Suspected COVID-19 virus infection [Z20.822] 10/16/2021    ESRD (end stage renal disease) ([facility] [N18.6] 10/04/2021    Acute respiratory failure with hypoxemia (HCC) [J96.01] 10/04/2021    Type 2 diabetes mellitus, with long-term current use of insulin (HCC) [E11.9, Z79.4]     Mixed hyperlipidemia [E78.2]        Current Facility-Administered Medications: heparin (porcine) injection 3,600 Units, 3,600 Units, IntraCATHeter, PRN  epoetin yohana-epbx (RETACRIT) injection 7,000 Units, 7,000 Units, SubCUTAneous, PRN  aspirin EC tablet 81 mg, 81 mg, Oral, Daily  pregabalin (LYRICA) capsule 75 mg, 75 mg, Oral, Nightly  amLODIPine (NORVASC) tablet 10 mg, 10 mg, Oral, Daily  spironolactone (ALDACTONE) tablet 50 mg, 50 mg, Oral, Daily  atorvastatin (LIPITOR) tablet 40 mg, 40 mg, Oral, Nightly  propranolol (INDERAL LA) extended release capsule 80 mg, 80 mg, Oral, QAM  cloNIDine (CATAPRES) 0.2 MG/24HR 1 patch, 1 patch, TransDERmal, Weekly  glucose (GLUTOSE) 40 % oral gel 15 g, 15 g, Oral, PRN  FLUoxetine (PROZAC) capsule 20 mg, 20 mg, Oral, Daily  tiotropium-olodaterol (STIOLTO) 2.5-2.5 MCG/ACT inhaler 2 puff, 2 puff, Inhalation, Daily  furosemide (LASIX) tablet 80 mg, 80 mg, Oral, Daily  insulin glargine (LANTUS;BASAGLAR) injection pen 20 Units, 20 Units, SubCUTAneous, QAM  lisinopril (PRINIVIL;ZESTRIL) tablet 40 mg, 40 mg, Oral, Daily  sodium chloride flush 0.9 % injection 10 mL, 10 mL, IntraVENous, 2 times per day  sodium chloride flush 0.9 % injection 10 mL, 10 mL, IntraVENous, PRN  0.9 % sodium chloride infusion, 25 mL, IntraVENous, PRN  enoxaparin (LOVENOX) injection 30 mg, 30 mg, SubCUTAneous, Daily  ondansetron (ZOFRAN-ODT) disintegrating tablet 4 mg, 4 mg, Oral, Q8H PRN **OR** ondansetron (ZOFRAN) injection 4 mg, 4 mg, IntraVENous, Q6H PRN  acetaminophen (TYLENOL) tablet 650 mg, 650 mg, Oral, Q6H PRN **OR** acetaminophen (TYLENOL) suppository 650 mg, 650 mg, Rectal, Q6H PRN  hydrALAZINE (APRESOLINE) injection 10 mg, 10 mg, IntraVENous, Q6H PRN  0.9 % sodium chloride bolus, 500 mL, IntraVENous, PRN  insulin lispro (1 Unit Dial) 0-12 Units, 0-12 Units, SubCUTAneous, TID WC  insulin lispro (1 Unit Dial) 0-6 Units, 0-6 Units, SubCUTAneous, Nightly  glucose (GLUTOSE) 40 % oral gel 15 g, 15 g, Oral, PRN  dextrose 50 % IV solution, 12.5 g, IntraVENous, PRN  glucagon (rDNA) injection 1 mg, 1 mg, IntraMUSCular, PRN  dextrose 5 % solution, 100 mL/hr, IntraVENous, PRN  racepinephrine HCl (VAPONEFPRIN) 2.25 % nebulizer solution NEBU 11.25 mg, 11.25 mg, Nebulization, Q4H PRN  sodium chloride nebulizer 0.9 % solution 3 mL, 3 mL, Nebulization, Q4H PRN  LORazepam (ATIVAN) tablet 0.5 mg, 0.5 mg, Oral, Q8H PRN  dexamethasone (PF) (DECADRON) injection 6 mg, 6 mg, IntraVENous, Daily  pregabalin (LYRICA) capsule 75 mg, 75 mg, Oral, Once         Objective:  /64   Pulse 88   Temp 98.9 °F (37.2 °C) (Oral)   Resp 18   Ht 5' 7\" (1.702 m)   Wt 193 lb 9 oz (87.8 kg)   LMP 10/14/2021   SpO2 99%   BMI 30.32 kg/m²      Patient Vitals for the past 24 hrs:   BP Temp Temp src Pulse Resp SpO2 Weight   10/18/21 0410 131/64 98.9 °F (37.2 °C) Oral 88 18 99 % --   10/18/21 0311 -- -- -- -- 18 95 % --   10/18/21 0023 (!) 155/75 98.9 °F (37.2 °C) Oral 91 18 99 % --   10/17/21 2113 -- -- -- -- 18 97 % --   10/17/21 2047 133/69 98.2 °F (36.8 °C) Oral 90 20 100 % --   10/17/21 1738 -- -- -- -- 20 100 % --   10/17/21 1715 (!) 151/70 97.9 °F (36.6 °C) Oral 89 18 100 % --   10/17/21 1650 (!) 153/72 96.3 °F (35.7 °C) -- 87 18 -- 193 lb 9 oz (87.8 kg)   10/17/21 1350 (!) 166/87 97.2 °F (36.2 °C) -- 89 20 -- 200 lb 2.8 oz (90.8 kg)   10/17/21 1202 -- -- -- -- 20 97 % --   10/17/21 1130 (!) 152/77 98 °F (36.7 °C) Oral 94 20 100 % --   10/17/21 0945 (!) 163/88 97.5 °F (36.4 °C) Oral 92 20 100 % --   10/17/21 0928 -- -- -- -- 20 100 % --     Patient Vitals for the past 96 hrs (Last 3 readings):   Weight   10/17/21 1650 193 lb 9 oz (87.8 kg)   10/17/21 1350 200 lb 2.8 oz (90.8 kg)   10/17/21 0043 196 lb 9.6 oz (89.2 kg)           Intake/Output Summary (Last 24 hours) at 10/18/2021 0848  Last data filed at 10/17/2021 1650  Gross per 24 hour   Intake 400 ml   Output 3400 ml   Net -3000 ml         Physical Exam:   /64   Pulse 88   Temp 98.9 °F (37.2 °C) (Oral)   Resp 18   Ht 5' 7\" (1.702 m)   Wt 193 lb 9 oz (87.8 kg)   LMP 10/14/2021   SpO2 99%   BMI 30.32 kg/m²   General appearance: alert, appears stated age and cooperative  Head: Normocephalic, without obvious abnormality, atraumatic  Lungs: crackles in bases  Heart: regular rate and rhythm, S1, S2 normal, no murmur, click, rub or gallop  Abdomen: soft, non-tender; bowel sounds normal; no masses,  no organomegaly  Extremities: 1+ edema    Labs:  Lab Results   Component Value Date    WBC 15.5 (H) 10/18/2021    HGB 8.1 (L) 10/18/2021    HCT 24.6 (L) 10/18/2021     10/18/2021    CHOL 164 02/24/2021    TRIG 319 (H) 08/31/2021    HDL 41 02/24/2021    LDLDIRECT 100 (H) 04/18/2011    ALT 8 (L) 10/17/2021    AST 13 (L) 10/17/2021     10/18/2021    K 3.8 10/18/2021    CL 99 10/18/2021    CREATININE 3.2 (H) 10/18/2021    BUN 24 (H) 10/18/2021    CO2 27 10/18/2021    INR 1.00 09/07/2021    LABA1C 5.4 10/16/2021    LABMICR 268.50 (H) 08/28/2021     Lab Results   Component Value Date    CKTOTAL 25 (L) 09/13/2021    TROPONINI 0.30 (H) 10/16/2021           Recent Imaging Results are Reviewed:  XR NECK SOFT TISSUE    Result Date: 10/16/2021  EXAMINATION: TWO XRAY VIEWS OF THE NECK SOFT TISSUES 10/16/2021 10:32 pm COMPARISON: None. HISTORY: ORDERING SYSTEM PROVIDED HISTORY: stridor TECHNOLOGIST PROVIDED HISTORY: Reason for exam:->stridor FINDINGS: The upper airway is patent. The retropharyngeal soft tissues and epiglottis are not thickened. There is narrowing at the glottis. No radiopaque foreign bodies are seen over the airway. Retropharyngeal soft tissues and epiglottis appear normal.  There is some narrowing at the glottis.      XR CHEST PORTABLE    Result Date: 10/16/2021  EXAMINATION: ONE XRAY VIEW OF THE CHEST 10/16/2021 7:06 pm COMPARISON: None. HISTORY: ORDERING SYSTEM PROVIDED HISTORY: SOB TECHNOLOGIST PROVIDED HISTORY: Reason for exam:->SOB Reason for Exam: SOB Acuity: Acute Type of Exam: Initial FINDINGS: There is a right subclavian catheter with its tip in the right atrium. The cardiomediastinal silhouette is mildly enlarged. There is mild pulmonary vascular congestion. There is no pleural effusion. There is no pneumothorax. There is no acute osseous abnormality. Mild pulmonary vascular congestion. Mild cardiomegaly. XR CHEST PORTABLE    Result Date: 10/6/2021  EXAMINATION: ONE XRAY VIEW OF THE CHEST 10/4/2021 2:33 pm COMPARISON: 10/03/2021 HISTORY: ORDERING SYSTEM PROVIDED HISTORY: SOB TECHNOLOGIST PROVIDED HISTORY: Reason for exam:->SOB Reason for Exam: Shortness of breath. Acuity: Acute Type of Exam: Initial FINDINGS: Worsening multifocal airspace consolidation in both lungs. Findings suggest worsening pneumonia. Removal of ET tube since prior. A right central line with tips in the SVC. No pneumothorax. No pleural effusion. Cardiomediastinal silhouette and bony thorax are unchanged. Extensive multifocal airspace disease in both lungs worsening since prior examination suggesting worsening pneumonia. CT CHEST PULMONARY EMBOLISM W CONTRAST    Result Date: 10/16/2021  EXAMINATION: CTA OF THE CHEST 10/16/2021 8:43 pm TECHNIQUE: CTA of the chest was performed after the administration of intravenous contrast.  Multiplanar reformatted images are provided for review. MIP images are provided for review. Dose modulation, iterative reconstruction, and/or weight based adjustment of the mA/kV was utilized to reduce the radiation dose to as low as reasonably achievable. COMPARISON: None.  HISTORY: ORDERING SYSTEM PROVIDED HISTORY: pe- dialysis patient - okay to contrast TECHNOLOGIST PROVIDED HISTORY: Reason for exam:->pe- dialysis patient - okay to contrast Decision Support Exception - unselect if not a suspected or confirmed emergency medical condition->Emergency Medical Condition (MA) Reason for Exam: Shortness of Breath (SOB with audbile wheezes for over one week. Denies pulmonary hx or exposure to recent illness; quit smoking 8 weeks ago & has anxiety. Reports was in the ICU for unknown reason & was placed on vent in August 2021; seen on 10/4 & needed blood transfusion.  ) FINDINGS: Pulmonary Arteries: Pulmonary arteries are adequately opacified for evaluation. No evidence of intraluminal filling defect to suggest pulmonary embolism. Main pulmonary artery is normal in caliber. Mediastinum: The heart is enlarged. The thoracic aorta and proximal great vessels are patent and of normal caliber. No pericardial effusion. No enlarged or suspicious axillary, hilar, or mediastinal lymphadenopathy. The thyroid gland is mildly enlarged and somewhat heterogeneous. The right jugular vein hemodialysis catheter ends in the upper right atrium. Lungs/pleura: The trachea and mainstem bronchi are widely patent. There is interlobular septal thickening with diffuse bilateral lower lobe airspace disease and ground-glass opacity. No cavitary lesions. No discrete pulmonary nodule or mass. Moderate right and small left pleural effusions. No pericardial effusion. No pneumothorax. Soft Tissues/Bones: Degenerative changes are present throughout the thoracic spine. Upper Abdomen: The visualized upper abdomen is unremarkable. No evidence of pulmonary embolism or acute thoracic aortic abnormality. Cardiomegaly with moderate pulmonary vascular congestive change, moderate right, and small left pleural effusions. Minimal superimposed bibasilar atelectasis. No significant pericardial effusion.        Lab Results   Component Value Date    GLUCOSE 135 10/18/2021     Lab Results   Component Value Date    POCGLU 236 10/17/2021       Assessment and Plan:  Principal Problem:    Acute respiratory failure [facility]) -Established problem. Mild improvement in o2 levels  Plan: cont to try to wean o2 down. Cont to try to remove fluid. covid test negative  Active Problems:    Type 2 diabetes mellitus, with long-term current use of insulin ([facility] -Established problem. Stable. FSBS 236. Possible elevated due to steroids  Plan: cont ccc diet, sldiing scale, home meds    Mixed hyperlipidemia -Established problem. Stable. Plan: Continue present orders/plan. ESRD (end stage renal disease) ([facility] -Established problem. Stable. Plan: for HD per renal    Acute respiratory failure with hypoxemia ([facility]    Suspected COVID-19 virus infection -Established problem, now resolved.    Plan: taken out of droplet plus isolation            Jenn Noyola MD  10/18/2021

## 2021-10-19 VITALS
RESPIRATION RATE: 16 BRPM | TEMPERATURE: 98.2 F | SYSTOLIC BLOOD PRESSURE: 131 MMHG | BODY MASS INDEX: 29.04 KG/M2 | WEIGHT: 185.04 LBS | HEIGHT: 67 IN | OXYGEN SATURATION: 97 % | DIASTOLIC BLOOD PRESSURE: 73 MMHG | HEART RATE: 85 BPM

## 2021-10-19 LAB
ANION GAP SERPL CALCULATED.3IONS-SCNC: 11 MMOL/L (ref 3–16)
BASOPHILS ABSOLUTE: 0.1 K/UL (ref 0–0.2)
BASOPHILS RELATIVE PERCENT: 0.5 %
BUN BLDV-MCNC: 26 MG/DL (ref 7–20)
CALCIUM SERPL-MCNC: 8.3 MG/DL (ref 8.3–10.6)
CHLORIDE BLD-SCNC: 96 MMOL/L (ref 99–110)
CO2: 27 MMOL/L (ref 21–32)
CREAT SERPL-MCNC: 3.6 MG/DL (ref 0.6–1.1)
EOSINOPHILS ABSOLUTE: 0.1 K/UL (ref 0–0.6)
EOSINOPHILS RELATIVE PERCENT: 0.7 %
GFR AFRICAN AMERICAN: 16
GFR NON-AFRICAN AMERICAN: 14
GLUCOSE BLD-MCNC: 110 MG/DL (ref 70–99)
GLUCOSE BLD-MCNC: 120 MG/DL (ref 70–99)
GLUCOSE BLD-MCNC: 92 MG/DL (ref 70–99)
HCT VFR BLD CALC: 26.6 % (ref 36–48)
HEMOGLOBIN: 8.9 G/DL (ref 12–16)
HEPATITIS B CORE TOTAL ANTIBODY: NEGATIVE
LYMPHOCYTES ABSOLUTE: 4.8 K/UL (ref 1–5.1)
LYMPHOCYTES RELATIVE PERCENT: 30.1 %
MCH RBC QN AUTO: 28.6 PG (ref 26–34)
MCHC RBC AUTO-ENTMCNC: 33.4 G/DL (ref 31–36)
MCV RBC AUTO: 85.9 FL (ref 80–100)
MONOCYTES ABSOLUTE: 1.5 K/UL (ref 0–1.3)
MONOCYTES RELATIVE PERCENT: 9.3 %
NEUTROPHILS ABSOLUTE: 9.4 K/UL (ref 1.7–7.7)
NEUTROPHILS RELATIVE PERCENT: 59.4 %
PDW BLD-RTO: 16.8 % (ref 12.4–15.4)
PERFORMED ON: ABNORMAL
PERFORMED ON: NORMAL
PLATELET # BLD: 315 K/UL (ref 135–450)
PMV BLD AUTO: 8.4 FL (ref 5–10.5)
POTASSIUM SERPL-SCNC: 3.6 MMOL/L (ref 3.5–5.1)
RBC # BLD: 3.1 M/UL (ref 4–5.2)
SODIUM BLD-SCNC: 134 MMOL/L (ref 136–145)
WBC # BLD: 15.8 K/UL (ref 4–11)

## 2021-10-19 PROCEDURE — 36415 COLL VENOUS BLD VENIPUNCTURE: CPT

## 2021-10-19 PROCEDURE — 6360000002 HC RX W HCPCS: Performed by: INTERNAL MEDICINE

## 2021-10-19 PROCEDURE — 80048 BASIC METABOLIC PNL TOTAL CA: CPT

## 2021-10-19 PROCEDURE — 6370000000 HC RX 637 (ALT 250 FOR IP): Performed by: INTERNAL MEDICINE

## 2021-10-19 PROCEDURE — 85025 COMPLETE CBC W/AUTO DIFF WBC: CPT

## 2021-10-19 PROCEDURE — 94761 N-INVAS EAR/PLS OXIMETRY MLT: CPT

## 2021-10-19 PROCEDURE — 2580000003 HC RX 258: Performed by: INTERNAL MEDICINE

## 2021-10-19 PROCEDURE — 94640 AIRWAY INHALATION TREATMENT: CPT

## 2021-10-19 RX ADMIN — PROPRANOLOL HYDROCHLORIDE 80 MG: 80 CAPSULE, EXTENDED RELEASE ORAL at 08:51

## 2021-10-19 RX ADMIN — Medication 10 ML: at 08:46

## 2021-10-19 RX ADMIN — ENOXAPARIN SODIUM 30 MG: 100 INJECTION SUBCUTANEOUS at 08:44

## 2021-10-19 RX ADMIN — AMLODIPINE BESYLATE 10 MG: 5 TABLET ORAL at 08:44

## 2021-10-19 RX ADMIN — FUROSEMIDE 80 MG: 40 TABLET ORAL at 08:44

## 2021-10-19 RX ADMIN — LISINOPRIL 40 MG: 20 TABLET ORAL at 08:44

## 2021-10-19 RX ADMIN — RACEPINEPHRINE HYDROCHLORIDE 11.25 MG: 11.25 SOLUTION RESPIRATORY (INHALATION) at 04:55

## 2021-10-19 RX ADMIN — ISODIUM CHLORIDE 3 ML: 0.03 SOLUTION RESPIRATORY (INHALATION) at 04:58

## 2021-10-19 RX ADMIN — TIOTROPIUM BROMIDE AND OLODATEROL 2 PUFF: 3.124; 2.736 SPRAY, METERED RESPIRATORY (INHALATION) at 09:43

## 2021-10-19 RX ADMIN — SPIRONOLACTONE 50 MG: 25 TABLET ORAL at 08:44

## 2021-10-19 RX ADMIN — ASPIRIN 81 MG: 81 TABLET, COATED ORAL at 08:44

## 2021-10-19 RX ADMIN — INSULIN GLARGINE 20 UNITS: 100 INJECTION, SOLUTION SUBCUTANEOUS at 08:47

## 2021-10-19 RX ADMIN — FLUOXETINE 20 MG: 20 CAPSULE ORAL at 08:44

## 2021-10-19 ASSESSMENT — ENCOUNTER SYMPTOMS
SHORTNESS OF BREATH: 0
ABDOMINAL PAIN: 0
EYE DISCHARGE: 0
BLOOD IN STOOL: 0
COUGH: 0
CONSTIPATION: 0
DIARRHEA: 0
FACIAL SWELLING: 0
CHEST TIGHTNESS: 0
NAUSEA: 0
EYE REDNESS: 0
ABDOMINAL DISTENTION: 0
PHOTOPHOBIA: 0
VOMITING: 0

## 2021-10-19 ASSESSMENT — PAIN SCALES - GENERAL: PAINLEVEL_OUTOF10: 0

## 2021-10-19 NOTE — PROGRESS NOTES
VSS. Patient is awake, resting in bed. Patient denies being in pain. Head to toe assessment completed. Scheduled medications given. No needs expressed at this time.

## 2021-10-19 NOTE — PROGRESS NOTES
[facility] Note    Patient Active Problem List   Diagnosis    Type 2 diabetes mellitus, with long-term current use of insulin (HCC)    Mixed hyperlipidemia    Migraine headache    Anemia    Diabetic foot infection ([address]    Pyogenic inflammation of bone ([address]    History of medication noncompliance    Osteomyelitis of left foot (HCC)    Nephrotic syndrome    Peripheral edema    Pulmonary edema    Right sided numbness    Tobacco dependence    Chronic kidney disease (CKD), stage III (moderate) ([facility])    Chronic diastolic (congestive) heart failure ([facility])    History of CVA (cerebrovascular accident)    Arterial ischemic stroke, ICA, left, acute ([address]    HTN (hypertension), benign    DM (diabetes mellitus), secondary, uncontrolled, w/neurologic complic ([address]    Dyslipidemia    Smoker    Panic disorder    Isolated proteinuria    Acute on chronic diastolic heart failure (HCC)    Diabetic peripheral neuropathy (HCC)    Acute respiratory failure (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, associated with type 2 diabetes mellitus ([address]    Cellulitis of right foot    Hidradenitis suppurativa    Hypocalcemia    Non-toxic multinodular goiter    Polyneuropathy due to type 2 diabetes mellitus (HCC)    Proliferative diabetic retinopathy associated with type 2 diabetes mellitus ([address]    ESRD (end stage renal disease) ([address]    Acute respiratory failure with hypoxemia ([address]    Acute respiratory failure due to COVID-19 ([address]    Suspected COVID-19 virus infection       Past Medical History:   has a past medical history of Blind in both eyes, Cerebral artery occlusion with cerebral infarction ([address] CHF (congestive heart failure) ([address] Chronic kidney disease, Depression, Diabetes mellitus out of control ([address] Diabetes mellitus, type II ([address] Diabetic neuropathy associated with type 2 diabetes mellitus ([facility] Generalized headaches, Hypertension, Infertility, Insomnia, Migraine headache, Mixed hyperlipidemia, Otitis media, Pelvic abscess in female, Pneumonia, Stroke [facility]), and Stroke ([facility] Past Social History:   reports that she has quit smoking. Her smoking use included cigarettes. She smoked 1.00 pack per day. She has never used smokeless tobacco. She reports that she does not drink alcohol and does not use drugs. Subjective:  Seen this am.  No SOB. Had HD yesterday. Review of Systems   Constitutional: Negative for activity change, appetite change, chills, fatigue, fever and unexpected weight change. HENT: Negative for congestion and facial swelling. Eyes: Negative for photophobia, discharge and redness. Respiratory: Negative for cough, chest tightness and shortness of breath. Cardiovascular: Negative for chest pain, palpitations and leg swelling. Gastrointestinal: Negative for abdominal distention, abdominal pain, blood in stool, constipation, diarrhea, nausea and vomiting. Endocrine: Negative for cold intolerance, heat intolerance and polyuria. Genitourinary: Negative for decreased urine volume, difficulty urinating, flank pain and hematuria. Musculoskeletal: Negative for joint swelling and neck pain. Neurological: Negative for dizziness, seizures, syncope, speech difficulty, light-headedness and headaches. Hematological: Does not bruise/bleed easily. Psychiatric/Behavioral: Negative for agitation, confusion and hallucinations.        Objective:      /73   Pulse 85   Temp 98.2 °F (36.8 °C) (Oral)   Resp 16   Ht 5' 7\" (1.702 m)   Wt 185 lb 0.6 oz (83.9 kg)   LMP 10/14/2021   SpO2 97%   BMI 28.98 kg/m²     Wt Readings from Last 3 Encounters:   10/19/21 185 lb 0.6 oz (83.9 kg)   10/05/21 195 lb 8 oz (88.7 kg)   09/23/21 190 lb 9.6 oz (86.5 kg)       BP Readings from Last 3 Encounters:   10/19/21 131/73 10/05/21 (!) 157/73   09/23/21 130/84     Chest- rhonchi b/l  Heart-regular  Abd-soft  Ext- no edema    Labs  Hemoglobin   Date Value Ref Range Status   10/19/2021 8.9 (L) 12.0 - 16.0 g/dL Final     Hematocrit   Date Value Ref Range Status   10/19/2021 26.6 (L) 36.0 - 48.0 % Final     WBC   Date Value Ref Range Status   10/19/2021 15.8 (H) 4.0 - 11.0 K/uL Final     Platelets   Date Value Ref Range Status   10/19/2021 315 135 - 450 K/uL Final     Lab Results   Component Value Date    CREATININE 3.6 (H) 10/19/2021    BUN 26 (H) 10/19/2021     (L) 10/19/2021    K 3.6 10/19/2021    CL 96 (L) 10/19/2021    CO2 27 10/19/2021       Assessment/Plan:  1. KINZA on CKD IV, with progression to ESRD. Underlying history of  diabetes and diabetic neuropathy. Probable diabetic nephropathy with  contrast exposure Sat and residual kidney function plus persistent  shortness of breath. Next HD Wednesday. Decreased EDW from 90kg to 83kg. Outpt unit informed. 2.  Hyperkalemia. Better. 3.  Anemia. RIA with hemodialysis. 4.  Hypertension. Follow with fluid removal challenge. Continue outpatient regimen. 5.  Diabetes mellitus with possible diabetic nephropathy. Management as per Medicine. 6.  Shortness of breath. COVID testing is negative. Better with fluid removal challenge. 7.  History of blindness. 8.  History of CHF. 9.  History of CVA, symptomatically stable. 10.  H/O Tracheal Stenosis  11.   Okay for D/C from renal perspective     Humberto Flores MD

## 2021-10-19 NOTE — DISCHARGE SUMMARY
[facility] -- Physician Discharge Summary     Oneyda Todd  1975  MRN: [id number]    Admit Date: 10/16/2021  Discharge Date: No discharge date for patient encounter. Attending MD: Phill Jauregui MD  Discharging MD: Phill Jauregui MD  PCP: Melody Mckeon [address] [facility] / [address] [phone]    Admission Diagnosis: Shortness of breath [R06.02]  Acute respiratory failure with hypoxia ([address] [J96.01]  Acute respiratory failure with hypoxemia ([address] [J96.01]  DISCHARGE DIAGNOSIS: same    Full Hospital Problem List:  ISA/Rola Shelton 1106 Problems    Diagnosis Date Noted    Suspected COVID-19 virus infection [Z20.822] 10/16/2021    ESRD (end stage renal disease) ([address] [N18.6] 10/04/2021    Acute respiratory failure with hypoxemia ([address] [J96.01] 10/04/2021    Type 2 diabetes mellitus, with long-term current use of insulin ([address] [E11.9, Z79.4]     Mixed hyperlipidemia [E78.2]            Hospital Course:  Sunni Wei a 55 y.o. female who presents to the emergency department complaining of shortness of breath with wheezing.  Patient reports symptoms over the past 1 week.  Reports that she quit smoking 8 weeks ago but is feeling very anxious.     Patient has history of intubation.     Hemodialysis patient, last session of dialysis was yesterday, has not missed dialysis.     Given new o2 requirement and unknown vaccination status, covid test has been sent in ER     Pt is currently on high flow o2, 12L now at 50%  Overnight she did have some reports of stridor, case was discussed with nurse and respiratory therapist at that time, patient was given dose of racemic epinephrine, this seems to have helped.     Pulmonary consultation was requested, and patient no longer has stridorous breaths by the time of this consultation. As such no further treatment is recommended    It is felt that patient's acute respiratory failure is due to fluid overload.   She is emergently dialyzed with 1 extra ultrafiltration run while here. Patient does quite well with this, and after the extra dialysis, she is breathing easily on room air    She is cleared for discharge from the nephrology standpoint. She is to maintain her regular Monday Wednesday Friday outpatient dialysis schedule. Next treatment is tomorrow. Patient states she will be able to get herself to her outpatient dialysis. As such patient will be discharged, no changes were made to her medicine. She is asked to follow-up with her primary care physician in 1 to 2 weeks. She is to maintain her regular dialysis schedule. Consults made during Hospitalization:  IP CONSULT TO NEPHROLOGY  IP CONSULT TO NEPHROLOGY  IP CONSULT TO PULMONOLOGY    Treatment team at time of Discharge: Treatment Team: Attending Provider: Jean-Paul Jones MD; Consulting Physician: Jean-Paul Jones MD; Consulting Physician: Matteo Dunn MD; Consulting Physician: Andreea Ruelas MD; Utilization Reviewer: Aidan Gutierrez RN; Respiratory Therapist (Day): Jose Daniel Martin RCP; Registered Nurse: Kathleen Palumbo RN    Imaging Results:  XR NECK SOFT TISSUE    Result Date: 10/16/2021  EXAMINATION: TWO XRAY VIEWS OF THE NECK SOFT TISSUES 10/16/2021 10:32 pm COMPARISON: None. HISTORY: ORDERING SYSTEM PROVIDED HISTORY: stridor TECHNOLOGIST PROVIDED HISTORY: Reason for exam:->stridor FINDINGS: The upper airway is patent. The retropharyngeal soft tissues and epiglottis are not thickened. There is narrowing at the glottis. No radiopaque foreign bodies are seen over the airway. Retropharyngeal soft tissues and epiglottis appear normal.  There is some narrowing at the glottis. XR CHEST PORTABLE    Result Date: 10/16/2021  EXAMINATION: ONE XRAY VIEW OF THE CHEST 10/16/2021 7:06 pm COMPARISON: None.  HISTORY: ORDERING SYSTEM PROVIDED HISTORY: SOB TECHNOLOGIST PROVIDED HISTORY: Reason for exam:->SOB Reason for Exam: SOB Acuity: Acute Type of Exam: Initial FINDINGS: There is a right subclavian catheter with its tip in the right atrium. The cardiomediastinal silhouette is mildly enlarged. There is mild pulmonary vascular congestion. There is no pleural effusion. There is no pneumothorax. There is no acute osseous abnormality. Mild pulmonary vascular congestion. Mild cardiomegaly. XR CHEST PORTABLE    Result Date: 10/6/2021  EXAMINATION: ONE XRAY VIEW OF THE CHEST 10/4/2021 2:33 pm COMPARISON: 10/03/2021 HISTORY: ORDERING SYSTEM PROVIDED HISTORY: SOB TECHNOLOGIST PROVIDED HISTORY: Reason for exam:->SOB Reason for Exam: Shortness of breath. Acuity: Acute Type of Exam: Initial FINDINGS: Worsening multifocal airspace consolidation in both lungs. Findings suggest worsening pneumonia. Removal of ET tube since prior. A right central line with tips in the SVC. No pneumothorax. No pleural effusion. Cardiomediastinal silhouette and bony thorax are unchanged. Extensive multifocal airspace disease in both lungs worsening since prior examination suggesting worsening pneumonia. CT CHEST PULMONARY EMBOLISM W CONTRAST    Result Date: 10/16/2021  EXAMINATION: CTA OF THE CHEST 10/16/2021 8:43 pm TECHNIQUE: CTA of the chest was performed after the administration of intravenous contrast.  Multiplanar reformatted images are provided for review. MIP images are provided for review. Dose modulation, iterative reconstruction, and/or weight based adjustment of the mA/kV was utilized to reduce the radiation dose to as low as reasonably achievable. COMPARISON: None. HISTORY: ORDERING SYSTEM PROVIDED HISTORY: pe- dialysis patient - okay to contrast TECHNOLOGIST PROVIDED HISTORY: Reason for exam:->pe- dialysis patient - okay to contrast Decision Support Exception - unselect if not a suspected or confirmed emergency medical condition->Emergency Medical Condition (MA) Reason for Exam: Shortness of Breath (SOB with audbile wheezes for over one week.   Denies pulmonary hx or exposure to recent illness; quit smoking 8 weeks ago & has anxiety. Reports was in the ICU for unknown reason & was placed on vent in August 2021; seen on 10/4 & needed blood transfusion.  ) FINDINGS: Pulmonary Arteries: Pulmonary arteries are adequately opacified for evaluation. No evidence of intraluminal filling defect to suggest pulmonary embolism. Main pulmonary artery is normal in caliber. Mediastinum: The heart is enlarged. The thoracic aorta and proximal great vessels are patent and of normal caliber. No pericardial effusion. No enlarged or suspicious axillary, hilar, or mediastinal lymphadenopathy. The thyroid gland is mildly enlarged and somewhat heterogeneous. The right jugular vein hemodialysis catheter ends in the upper right atrium. Lungs/pleura: The trachea and mainstem bronchi are widely patent. There is interlobular septal thickening with diffuse bilateral lower lobe airspace disease and ground-glass opacity. No cavitary lesions. No discrete pulmonary nodule or mass. Moderate right and small left pleural effusions. No pericardial effusion. No pneumothorax. Soft Tissues/Bones: Degenerative changes are present throughout the thoracic spine. Upper Abdomen: The visualized upper abdomen is unremarkable. No evidence of pulmonary embolism or acute thoracic aortic abnormality. Cardiomegaly with moderate pulmonary vascular congestive change, moderate right, and small left pleural effusions. Minimal superimposed bibasilar atelectasis. No significant pericardial effusion.          Discharge Exam:  BP (!) 152/78   Pulse 87   Temp 98.7 °F (37.1 °C) (Oral)   Resp 16   Ht 5' 7\" (1.702 m)   Wt 193 lb 9 oz (87.8 kg)   LMP 10/14/2021   SpO2 98%   BMI 30.32 kg/m²   General appearance: alert, appears stated age and cooperative  Head: Normocephalic, without obvious abnormality, atraumatic  Lungs: clear to auscultation bilaterally  Heart: regular rate and rhythm, S1, S2 normal, no murmur, click, rub or gallop  Abdomen: soft, non-tender; bowel sounds normal; no masses,  no organomegaly  Extremities: extremities normal, atraumatic, no cyanosis or edema    Disposition: home    Condition: stable    Discharge Medications:   Eden Hutchinson   Home Medication Instructions [facility]:[id number]    Printed on:10/19/21 0905   Medication Information                      amLODIPine (NORVASC) 10 MG tablet  Take 1 tablet by mouth daily             aspirin 81 MG EC tablet  Take 1 tablet by mouth daily             atorvastatin (LIPITOR) 40 MG tablet  Take 1 tablet by mouth nightly             clonazePAM (KLONOPIN) 0.5 MG tablet  Take 0.5 mg by mouth 2 times daily as needed. cloNIDine (CATAPRES) 0.2 MG/24HR PTWK  Place 1 patch onto the skin once a week             Dulaglutide 0.75 MG/0.5ML SOPN  Inject 0.75 mg into the skin once a week             FLUoxetine (PROZAC) 20 MG capsule  Take 1 capsule by mouth daily             furosemide (LASIX) 80 MG tablet  Take 80 mg by mouth daily             glucose (GLUTOSE) 40 % GEL  Take 37.5 mLs by mouth as needed (low blood sugar)             glycopyrrolate-formoterol (BEVESPI AEROSPHERE) 9-4.8 MCG/ACT AERO  Inhale 2 puffs into the lungs 2 times daily             insulin glargine (LANTUS SOLOSTAR) 100 UNIT/ML injection pen  Inject 20 Units into the skin every morning              insulin lispro, 1 Unit Dial, 100 UNIT/ML SOPN  Inject 0-6 Units into the skin 3 times daily (with meals) **Corrective Low Dose Algorithm**  Glucose: Dose:               No Insulin  140-199 1 Unit  200-249 2 Units  250-299 3 Units  300-349 4 Units  350-399 5 Units  Over 399 6 Units             Insulin Pen Needle 29G X 12.7MM MISC  1 each by Does not apply route daily             lisinopril (PRINIVIL;ZESTRIL) 40 MG tablet  Take 40 mg by mouth daily             LORazepam (ATIVAN) 0.5 MG tablet  Take 0.5 mg by mouth every 8 hours as needed for Anxiety.              pregabalin (LYRICA) 75 MG capsule  Take 1 capsule by mouth nightly for 7 days. propranolol (INDERAL LA) 80 MG extended release capsule  Take 1 capsule by mouth every morning             spironolactone (ALDACTONE) 50 MG tablet  Take 1 tablet by mouth daily                 Allergies: Allergies   Allergen Reactions    Amoxicillin Itching and Hives     Tolerates cephalosporins  Patient tolerating cefazolin (ANCEF) as of October 11, 2018  Patient tolerating cefazolin (ANCEF) as of October 11, 2018  Tolerates cephalosporins  Patient tolerating cefazolin (ANCEF) as of October 11, 2018    Levofloxacin Anaphylaxis    Vancomycin Shortness Of Breath, Hives and Anaphylaxis    Tape [Adhesive Tape] Other (See Comments)     Paper tape turns skin bright red. Plastic tape okay. Follow up Instructions: Follow-up with PCP: Mar Summers in 2 wk .       Total time spent on day of discharge including face-to-face visit, examination, documentation, counseling, preparation of discharge plans and followup, and discharge medicine reconciliation and presciptions is 35 minutes    Signed:  Kaz Garibay MD  10/19/2021

## 2021-10-20 ENCOUNTER — APPOINTMENT (OUTPATIENT)
Dept: GENERAL RADIOLOGY | Age: 46
DRG: 720 | End: 2021-10-20
Payer: COMMERCIAL

## 2021-10-20 ENCOUNTER — HOSPITAL ENCOUNTER (INPATIENT)
Age: 46
LOS: 1 days | Discharge: HOME | DRG: 720 | End: 2021-10-21
Attending: EMERGENCY MEDICINE | Admitting: INTERNAL MEDICINE
Payer: COMMERCIAL

## 2021-10-20 ENCOUNTER — TELEPHONE (OUTPATIENT)
Dept: OTHER | Facility: CLINIC | Age: 46
End: 2021-10-20

## 2021-10-20 ENCOUNTER — APPOINTMENT (OUTPATIENT)
Dept: CT IMAGING | Age: 46
DRG: 720 | End: 2021-10-20
Payer: COMMERCIAL

## 2021-10-20 DIAGNOSIS — R06.1 STRIDOR: Primary | ICD-10-CM

## 2021-10-20 DIAGNOSIS — J04.30 SUPRAGLOTTITIS WITHOUT AIRWAY OBSTRUCTION: ICD-10-CM

## 2021-10-20 PROBLEM — J05.10 EPIGLOTTITIS: Status: ACTIVE | Noted: 2021-10-20

## 2021-10-20 LAB
A/G RATIO: 1.2 (ref 1.1–2.2)
ALBUMIN SERPL-MCNC: 4.2 G/DL (ref 3.4–5)
ALP BLD-CCNC: 144 U/L (ref 40–129)
ALT SERPL-CCNC: 9 U/L (ref 10–40)
ANION GAP SERPL CALCULATED.3IONS-SCNC: 14 MMOL/L (ref 3–16)
AST SERPL-CCNC: 15 U/L (ref 15–37)
BASOPHILS ABSOLUTE: 0.1 K/UL (ref 0–0.2)
BASOPHILS RELATIVE PERCENT: 0.4 %
BILIRUB SERPL-MCNC: 0.3 MG/DL (ref 0–1)
BLOOD CULTURE, ROUTINE: NORMAL
BUN BLDV-MCNC: 36 MG/DL (ref 7–20)
CALCIUM SERPL-MCNC: 9 MG/DL (ref 8.3–10.6)
CHLORIDE BLD-SCNC: 98 MMOL/L (ref 99–110)
CO2: 25 MMOL/L (ref 21–32)
CREAT SERPL-MCNC: 4.8 MG/DL (ref 0.6–1.1)
CULTURE, BLOOD 2: NORMAL
EKG ATRIAL RATE: 93 BPM
EKG DIAGNOSIS: NORMAL
EKG P AXIS: 42 DEGREES
EKG P-R INTERVAL: 134 MS
EKG Q-T INTERVAL: 366 MS
EKG QRS DURATION: 82 MS
EKG QTC CALCULATION (BAZETT): 455 MS
EKG R AXIS: 29 DEGREES
EKG T AXIS: 52 DEGREES
EKG VENTRICULAR RATE: 93 BPM
EOSINOPHILS ABSOLUTE: 0.2 K/UL (ref 0–0.6)
EOSINOPHILS RELATIVE PERCENT: 1 %
GFR AFRICAN AMERICAN: 12
GFR NON-AFRICAN AMERICAN: 10
GLOBULIN: 3.6 G/DL
GLUCOSE BLD-MCNC: 146 MG/DL (ref 70–99)
GLUCOSE BLD-MCNC: 389 MG/DL (ref 70–99)
HCT VFR BLD CALC: 31.4 % (ref 36–48)
HEMOGLOBIN: 10.3 G/DL (ref 12–16)
LACTIC ACID, SEPSIS: 0.4 MMOL/L (ref 0.4–1.9)
LYMPHOCYTES ABSOLUTE: 2.8 K/UL (ref 1–5.1)
LYMPHOCYTES RELATIVE PERCENT: 14.3 %
MCH RBC QN AUTO: 28.4 PG (ref 26–34)
MCHC RBC AUTO-ENTMCNC: 32.8 G/DL (ref 31–36)
MCV RBC AUTO: 86.7 FL (ref 80–100)
MONOCYTES ABSOLUTE: 1.8 K/UL (ref 0–1.3)
MONOCYTES RELATIVE PERCENT: 9.4 %
NEUTROPHILS ABSOLUTE: 14.7 K/UL (ref 1.7–7.7)
NEUTROPHILS RELATIVE PERCENT: 74.9 %
PDW BLD-RTO: 17 % (ref 12.4–15.4)
PERFORMED ON: ABNORMAL
PLATELET # BLD: 372 K/UL (ref 135–450)
PMV BLD AUTO: 8.2 FL (ref 5–10.5)
POTASSIUM REFLEX MAGNESIUM: 4.5 MMOL/L (ref 3.5–5.1)
PRO-BNP: ABNORMAL PG/ML (ref 0–124)
RBC # BLD: 3.62 M/UL (ref 4–5.2)
SODIUM BLD-SCNC: 137 MMOL/L (ref 136–145)
TOTAL PROTEIN: 7.8 G/DL (ref 6.4–8.2)
TROPONIN: 0.2 NG/ML
WBC # BLD: 19.7 K/UL (ref 4–11)

## 2021-10-20 PROCEDURE — 2500000003 HC RX 250 WO HCPCS: Performed by: INTERNAL MEDICINE

## 2021-10-20 PROCEDURE — 70360 X-RAY EXAM OF NECK: CPT

## 2021-10-20 PROCEDURE — 80053 COMPREHEN METABOLIC PANEL: CPT

## 2021-10-20 PROCEDURE — 93010 ELECTROCARDIOGRAM REPORT: CPT | Performed by: INTERNAL MEDICINE

## 2021-10-20 PROCEDURE — 6360000002 HC RX W HCPCS: Performed by: PHYSICIAN ASSISTANT

## 2021-10-20 PROCEDURE — 85025 COMPLETE CBC W/AUTO DIFF WBC: CPT

## 2021-10-20 PROCEDURE — 71045 X-RAY EXAM CHEST 1 VIEW: CPT

## 2021-10-20 PROCEDURE — 6360000004 HC RX CONTRAST MEDICATION: Performed by: PHYSICIAN ASSISTANT

## 2021-10-20 PROCEDURE — 93005 ELECTROCARDIOGRAM TRACING: CPT | Performed by: EMERGENCY MEDICINE

## 2021-10-20 PROCEDURE — 2060000000 HC ICU INTERMEDIATE R&B

## 2021-10-20 PROCEDURE — 99283 EMERGENCY DEPT VISIT LOW MDM: CPT

## 2021-10-20 PROCEDURE — 70491 CT SOFT TISSUE NECK W/DYE: CPT

## 2021-10-20 PROCEDURE — 31575 DIAGNOSTIC LARYNGOSCOPY: CPT | Performed by: OTOLARYNGOLOGY

## 2021-10-20 PROCEDURE — 84484 ASSAY OF TROPONIN QUANT: CPT

## 2021-10-20 PROCEDURE — 87040 BLOOD CULTURE FOR BACTERIA: CPT

## 2021-10-20 PROCEDURE — 6370000000 HC RX 637 (ALT 250 FOR IP): Performed by: INTERNAL MEDICINE

## 2021-10-20 PROCEDURE — 0CJS8ZZ INSPECTION OF LARYNX, VIA NATURAL OR ARTIFICIAL OPENING ENDOSCOPIC: ICD-10-PCS | Performed by: OTOLARYNGOLOGY

## 2021-10-20 PROCEDURE — 6360000002 HC RX W HCPCS: Performed by: INTERNAL MEDICINE

## 2021-10-20 PROCEDURE — 83605 ASSAY OF LACTIC ACID: CPT

## 2021-10-20 PROCEDURE — 2580000003 HC RX 258: Performed by: INTERNAL MEDICINE

## 2021-10-20 PROCEDURE — 2500000003 HC RX 250 WO HCPCS: Performed by: PHYSICIAN ASSISTANT

## 2021-10-20 PROCEDURE — 99221 1ST HOSP IP/OBS SF/LOW 40: CPT | Performed by: OTOLARYNGOLOGY

## 2021-10-20 PROCEDURE — 96374 THER/PROPH/DIAG INJ IV PUSH: CPT

## 2021-10-20 PROCEDURE — 83880 ASSAY OF NATRIURETIC PEPTIDE: CPT

## 2021-10-20 PROCEDURE — 83036 HEMOGLOBIN GLYCOSYLATED A1C: CPT

## 2021-10-20 RX ORDER — NICOTINE POLACRILEX 4 MG
15 LOZENGE BUCCAL PRN
Status: DISCONTINUED | OUTPATIENT
Start: 2021-10-20 | End: 2021-10-21 | Stop reason: HOSPADM

## 2021-10-20 RX ORDER — LISINOPRIL 20 MG/1
40 TABLET ORAL DAILY
Status: DISCONTINUED | OUTPATIENT
Start: 2021-10-20 | End: 2021-10-21 | Stop reason: HOSPADM

## 2021-10-20 RX ORDER — POTASSIUM CHLORIDE 7.45 MG/ML
10 INJECTION INTRAVENOUS PRN
Status: DISCONTINUED | OUTPATIENT
Start: 2021-10-20 | End: 2021-10-20

## 2021-10-20 RX ORDER — CLONAZEPAM 0.5 MG/1
0.5 TABLET ORAL 2 TIMES DAILY PRN
Status: DISCONTINUED | OUTPATIENT
Start: 2021-10-20 | End: 2021-10-21 | Stop reason: HOSPADM

## 2021-10-20 RX ORDER — SODIUM CHLORIDE 9 MG/ML
25 INJECTION, SOLUTION INTRAVENOUS PRN
Status: DISCONTINUED | OUTPATIENT
Start: 2021-10-20 | End: 2021-10-21 | Stop reason: HOSPADM

## 2021-10-20 RX ORDER — CLINDAMYCIN PHOSPHATE 600 MG/50ML
600 INJECTION INTRAVENOUS ONCE
Status: COMPLETED | OUTPATIENT
Start: 2021-10-20 | End: 2021-10-20

## 2021-10-20 RX ORDER — SODIUM CHLORIDE 0.9 % (FLUSH) 0.9 %
5-40 SYRINGE (ML) INJECTION EVERY 12 HOURS SCHEDULED
Status: DISCONTINUED | OUTPATIENT
Start: 2021-10-20 | End: 2021-10-21 | Stop reason: HOSPADM

## 2021-10-20 RX ORDER — POTASSIUM CHLORIDE 20 MEQ/1
40 TABLET, EXTENDED RELEASE ORAL PRN
Status: DISCONTINUED | OUTPATIENT
Start: 2021-10-20 | End: 2021-10-20

## 2021-10-20 RX ORDER — HEPARIN SODIUM 5000 [USP'U]/ML
5000 INJECTION, SOLUTION INTRAVENOUS; SUBCUTANEOUS EVERY 8 HOURS SCHEDULED
Status: DISCONTINUED | OUTPATIENT
Start: 2021-10-20 | End: 2021-10-21 | Stop reason: HOSPADM

## 2021-10-20 RX ORDER — SODIUM CHLORIDE 9 MG/ML
INJECTION, SOLUTION INTRAVENOUS CONTINUOUS
Status: DISCONTINUED | OUTPATIENT
Start: 2021-10-20 | End: 2021-10-21

## 2021-10-20 RX ORDER — INSULIN LISPRO 100 [IU]/ML
0-12 INJECTION, SOLUTION INTRAVENOUS; SUBCUTANEOUS
Status: DISCONTINUED | OUTPATIENT
Start: 2021-10-20 | End: 2021-10-21 | Stop reason: HOSPADM

## 2021-10-20 RX ORDER — DEXAMETHASONE SODIUM PHOSPHATE 4 MG/ML
10 INJECTION, SOLUTION INTRA-ARTICULAR; INTRALESIONAL; INTRAMUSCULAR; INTRAVENOUS; SOFT TISSUE ONCE
Status: COMPLETED | OUTPATIENT
Start: 2021-10-20 | End: 2021-10-20

## 2021-10-20 RX ORDER — HYDRALAZINE HYDROCHLORIDE 20 MG/ML
10 INJECTION INTRAMUSCULAR; INTRAVENOUS EVERY 6 HOURS PRN
Status: DISCONTINUED | OUTPATIENT
Start: 2021-10-20 | End: 2021-10-21 | Stop reason: HOSPADM

## 2021-10-20 RX ORDER — ACETAMINOPHEN 325 MG/1
650 TABLET ORAL EVERY 6 HOURS PRN
Status: DISCONTINUED | OUTPATIENT
Start: 2021-10-20 | End: 2021-10-21 | Stop reason: HOSPADM

## 2021-10-20 RX ORDER — CLINDAMYCIN PHOSPHATE 600 MG/50ML
600 INJECTION INTRAVENOUS EVERY 8 HOURS
Status: DISCONTINUED | OUTPATIENT
Start: 2021-10-20 | End: 2021-10-21 | Stop reason: HOSPADM

## 2021-10-20 RX ORDER — 0.9 % SODIUM CHLORIDE 0.9 %
500 INTRAVENOUS SOLUTION INTRAVENOUS PRN
Status: DISCONTINUED | OUTPATIENT
Start: 2021-10-20 | End: 2021-10-21 | Stop reason: HOSPADM

## 2021-10-20 RX ORDER — PROPRANOLOL HYDROCHLORIDE 80 MG/1
80 CAPSULE, EXTENDED RELEASE ORAL EVERY MORNING
Status: DISCONTINUED | OUTPATIENT
Start: 2021-10-20 | End: 2021-10-21 | Stop reason: HOSPADM

## 2021-10-20 RX ORDER — INSULIN LISPRO 100 [IU]/ML
0-6 INJECTION, SOLUTION INTRAVENOUS; SUBCUTANEOUS NIGHTLY
Status: DISCONTINUED | OUTPATIENT
Start: 2021-10-20 | End: 2021-10-21 | Stop reason: HOSPADM

## 2021-10-20 RX ORDER — DEXTROSE MONOHYDRATE 25 G/50ML
12.5 INJECTION, SOLUTION INTRAVENOUS PRN
Status: DISCONTINUED | OUTPATIENT
Start: 2021-10-20 | End: 2021-10-21 | Stop reason: HOSPADM

## 2021-10-20 RX ORDER — FLUOXETINE HYDROCHLORIDE 20 MG/1
20 CAPSULE ORAL DAILY
Status: DISCONTINUED | OUTPATIENT
Start: 2021-10-20 | End: 2021-10-21 | Stop reason: HOSPADM

## 2021-10-20 RX ORDER — AMLODIPINE BESYLATE 5 MG/1
10 TABLET ORAL DAILY
Status: DISCONTINUED | OUTPATIENT
Start: 2021-10-20 | End: 2021-10-21 | Stop reason: HOSPADM

## 2021-10-20 RX ORDER — LORAZEPAM 0.5 MG/1
0.5 TABLET ORAL EVERY 8 HOURS PRN
Status: DISCONTINUED | OUTPATIENT
Start: 2021-10-20 | End: 2021-10-21 | Stop reason: HOSPADM

## 2021-10-20 RX ORDER — FUROSEMIDE 40 MG/1
80 TABLET ORAL DAILY
Status: DISCONTINUED | OUTPATIENT
Start: 2021-10-21 | End: 2021-10-21 | Stop reason: HOSPADM

## 2021-10-20 RX ORDER — ONDANSETRON 4 MG/1
4 TABLET, ORALLY DISINTEGRATING ORAL EVERY 8 HOURS PRN
Status: DISCONTINUED | OUTPATIENT
Start: 2021-10-20 | End: 2021-10-21 | Stop reason: HOSPADM

## 2021-10-20 RX ORDER — HEPARIN SODIUM 1000 [USP'U]/ML
3600 INJECTION, SOLUTION INTRAVENOUS; SUBCUTANEOUS PRN
Status: DISCONTINUED | OUTPATIENT
Start: 2021-10-20 | End: 2021-10-21 | Stop reason: HOSPADM

## 2021-10-20 RX ORDER — ATORVASTATIN CALCIUM 40 MG/1
40 TABLET, FILM COATED ORAL NIGHTLY
Status: DISCONTINUED | OUTPATIENT
Start: 2021-10-20 | End: 2021-10-21 | Stop reason: HOSPADM

## 2021-10-20 RX ORDER — PREGABALIN 75 MG/1
75 CAPSULE ORAL NIGHTLY
Status: DISCONTINUED | OUTPATIENT
Start: 2021-10-20 | End: 2021-10-21 | Stop reason: HOSPADM

## 2021-10-20 RX ORDER — CLONIDINE 0.2 MG/24H
1 PATCH, EXTENDED RELEASE TRANSDERMAL WEEKLY
Status: DISCONTINUED | OUTPATIENT
Start: 2021-10-24 | End: 2021-10-21 | Stop reason: HOSPADM

## 2021-10-20 RX ORDER — ACETAMINOPHEN 650 MG/1
650 SUPPOSITORY RECTAL EVERY 6 HOURS PRN
Status: DISCONTINUED | OUTPATIENT
Start: 2021-10-20 | End: 2021-10-21 | Stop reason: HOSPADM

## 2021-10-20 RX ORDER — LORAZEPAM 2 MG/ML
0.5 INJECTION INTRAMUSCULAR ONCE
Status: COMPLETED | OUTPATIENT
Start: 2021-10-20 | End: 2021-10-20

## 2021-10-20 RX ORDER — ONDANSETRON 2 MG/ML
4 INJECTION INTRAMUSCULAR; INTRAVENOUS EVERY 6 HOURS PRN
Status: DISCONTINUED | OUTPATIENT
Start: 2021-10-20 | End: 2021-10-21 | Stop reason: HOSPADM

## 2021-10-20 RX ORDER — DEXAMETHASONE SODIUM PHOSPHATE 4 MG/ML
4 INJECTION, SOLUTION INTRA-ARTICULAR; INTRALESIONAL; INTRAMUSCULAR; INTRAVENOUS; SOFT TISSUE EVERY 12 HOURS
Status: DISCONTINUED | OUTPATIENT
Start: 2021-10-21 | End: 2021-10-21 | Stop reason: HOSPADM

## 2021-10-20 RX ORDER — SODIUM CHLORIDE 0.9 % (FLUSH) 0.9 %
5-40 SYRINGE (ML) INJECTION PRN
Status: DISCONTINUED | OUTPATIENT
Start: 2021-10-20 | End: 2021-10-21 | Stop reason: HOSPADM

## 2021-10-20 RX ORDER — ASPIRIN 81 MG/1
81 TABLET ORAL DAILY
Status: DISCONTINUED | OUTPATIENT
Start: 2021-10-20 | End: 2021-10-21 | Stop reason: HOSPADM

## 2021-10-20 RX ORDER — DEXTROSE MONOHYDRATE 50 MG/ML
100 INJECTION, SOLUTION INTRAVENOUS PRN
Status: DISCONTINUED | OUTPATIENT
Start: 2021-10-20 | End: 2021-10-21 | Stop reason: HOSPADM

## 2021-10-20 RX ORDER — SPIRONOLACTONE 25 MG/1
50 TABLET ORAL DAILY
Status: DISCONTINUED | OUTPATIENT
Start: 2021-10-21 | End: 2021-10-21 | Stop reason: HOSPADM

## 2021-10-20 RX ORDER — INSULIN LISPRO 100 [IU]/ML
0-6 INJECTION, SOLUTION INTRAVENOUS; SUBCUTANEOUS
Status: DISCONTINUED | OUTPATIENT
Start: 2021-10-21 | End: 2021-10-21

## 2021-10-20 RX ADMIN — SODIUM CHLORIDE: 9 INJECTION, SOLUTION INTRAVENOUS at 20:26

## 2021-10-20 RX ADMIN — HEPARIN SODIUM 5000 UNITS: 5000 INJECTION INTRAVENOUS; SUBCUTANEOUS at 21:42

## 2021-10-20 RX ADMIN — FLUOXETINE 20 MG: 20 CAPSULE ORAL at 21:49

## 2021-10-20 RX ADMIN — CLINDAMYCIN PHOSPHATE 600 MG: 600 INJECTION, SOLUTION INTRAVENOUS at 21:59

## 2021-10-20 RX ADMIN — ATORVASTATIN CALCIUM 40 MG: 40 TABLET, FILM COATED ORAL at 21:48

## 2021-10-20 RX ADMIN — LISINOPRIL 40 MG: 20 TABLET ORAL at 21:49

## 2021-10-20 RX ADMIN — IOPAMIDOL 75 ML: 755 INJECTION, SOLUTION INTRAVENOUS at 12:03

## 2021-10-20 RX ADMIN — LORAZEPAM 0.5 MG: 0.5 TABLET ORAL at 21:49

## 2021-10-20 RX ADMIN — DEXAMETHASONE SODIUM PHOSPHATE 10 MG: 4 INJECTION, SOLUTION INTRAMUSCULAR; INTRAVENOUS at 13:44

## 2021-10-20 RX ADMIN — AMLODIPINE BESYLATE 10 MG: 5 TABLET ORAL at 21:49

## 2021-10-20 RX ADMIN — ASPIRIN 81 MG: 81 TABLET, COATED ORAL at 21:49

## 2021-10-20 RX ADMIN — PROPRANOLOL HYDROCHLORIDE 80 MG: 80 CAPSULE, EXTENDED RELEASE ORAL at 22:30

## 2021-10-20 RX ADMIN — INSULIN LISPRO 5 UNITS: 100 INJECTION, SOLUTION INTRAVENOUS; SUBCUTANEOUS at 21:40

## 2021-10-20 RX ADMIN — PREGABALIN 75 MG: 75 CAPSULE ORAL at 21:48

## 2021-10-20 RX ADMIN — CLINDAMYCIN PHOSPHATE 600 MG: 600 INJECTION, SOLUTION INTRAVENOUS at 13:47

## 2021-10-20 RX ADMIN — Medication 10 ML: at 20:24

## 2021-10-20 RX ADMIN — LORAZEPAM 0.5 MG: 2 INJECTION INTRAMUSCULAR; INTRAVENOUS at 10:41

## 2021-10-20 ASSESSMENT — PAIN SCALES - GENERAL: PAINLEVEL_OUTOF10: 0

## 2021-10-20 ASSESSMENT — ENCOUNTER SYMPTOMS
STRIDOR: 1
FACIAL SWELLING: 0
NAUSEA: 0
CHEST TIGHTNESS: 0
BACK PAIN: 0
DIARRHEA: 0
TROUBLE SWALLOWING: 0
SINUS PRESSURE: 0
COLOR CHANGE: 0
ABDOMINAL PAIN: 0
APNEA: 0
VOMITING: 0
SORE THROAT: 0
CHOKING: 0
SINUS PAIN: 0
COUGH: 0
SHORTNESS OF BREATH: 1
RHINORRHEA: 0
VOICE CHANGE: 0
WHEEZING: 0
CONSTIPATION: 0

## 2021-10-20 NOTE — DISCHARGE INSTR - COC
Continuity of Care Form    Patient Name: Tc Gama   :  1975  MRN:  [id number]    Admit date:  10/20/2021  Discharge date:  ***    Code Status Order: Prior   Advance Directives:     Admitting Physician:  No admitting provider for patient encounter.   PCP: Baljit Jay    Discharging Nurse: [facility] Unit/Room#: ED-0026/26  Discharging Unit Phone Number: ***    Emergency Contact:   Extended Emergency Contact Information  Primary Emergency Contact: [address] Phone: [phone]  Relation: Spouse    Past Surgical History:  Past Surgical History:   Procedure Laterality Date    CERVIX SURGERY      laser tx for dysplasia;    EYE SURGERY      FOOT SURGERY Right     FOOT SURGERY Bilateral     FOOT SURGERY Left     IR TUNNELED CATHETER PLACEMENT GREATER THAN 5 YEARS  2021    IR TUNNELED CATHETER PLACEMENT GREATER THAN 5 YEARS 2021 Roro Ortega MD [facility] SPECIAL PROCEDURES       Immunization History:   Immunization History   Administered Date(s) Administered    Influenza 2011    Influenza Virus Vaccine 2011, 10/05/2013    Tdap (Boostrix, Adacel) 2018       Active Problems:  Patient Active Problem List   Diagnosis Code    Type 2 diabetes mellitus, with long-term current use of insulin ([facility] E11.9, Z79.4    Mixed hyperlipidemia E78.2    Migraine headache G43.909    Anemia D64.9    Diabetic foot infection ([address] E11.628, L08.9    Pyogenic inflammation of bone ([address] M86.9    History of medication noncompliance Z91.14    Osteomyelitis of left foot ([address] M86.9    Nephrotic syndrome N04.9    Peripheral edema R60.9    Pulmonary edema J81.1    Right sided numbness R20.0    Tobacco dependence F17.200    Chronic kidney disease (CKD), stage III (moderate) (HCC) N18.30    Chronic diastolic (congestive) heart failure (HCC) I50.32    History of CVA (cerebrovascular accident) Z86.73    Arterial ischemic stroke, ICA, left, acute ([address] U31.957  HTN (hypertension), benign I10    DM (diabetes mellitus), secondary, uncontrolled, w/neurologic complic ([facility]) J68.73, C20.27    Dyslipidemia E78.5    Smoker F17.200    Panic disorder F41.0    Isolated proteinuria R80.0    Acute on chronic diastolic heart failure ([facility]) I50.33    Diabetic peripheral neuropathy ([facility]) E11.42    Acute respiratory failure ([facility]) J96.00    Depression F32. 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01    Acute respiratory failure due to COVID-19 ([facility] U07.1, J96.00    Suspected COVID-19 virus infection Z20.822    Epiglottitis J05.10       Isolation/Infection:   Isolation          No Isolation        Patient Infection Status     Infection Onset Added Last Indicated Last Indicated By Review Planned Expiration Resolved Resolved By    None active    Resolved    COVID-19 Rule Out 10/16/21 10/16/21 10/17/21 COVID-19 (Ordered)   10/17/21 Rule-Out Test Resulted    COVID-19 Rule Out 10/04/21 10/04/21 10/04/21 COVID-19 (Ordered)   10/05/21 Rule-Out Test Resulted    COVID-19 Rule Out 08/27/21 08/27/21 08/27/21 COVID-19 (Ordered)   08/28/21 Rule-Out Test Resulted    COVID-19 Rule Out 02/23/21 02/23/21 02/23/21 COVID-19, Rapid (Ordered)   02/23/21 Rule-Out Test Resulted    MRSA 04/22/19 04/25/19 04/22/19 Wound Culture   08/29/20 Nathanel Corners, RN          Nurse Assessment:  Last Vital Signs: /70   Pulse 92   Temp 98.5 °F (36.9 °C)   Resp 13 Wt 185 lb (83.9 kg)   LMP 10/14/2021   SpO2 100%   BMI 28.98 kg/m²     Last documented pain score (0-10 scale):    Last Weight:   Wt Readings from Last 1 Encounters:   10/20/21 185 lb (83.9 kg)     Mental Status:  {IP PT MENTAL STATUS:}    IV Access:  { SARAH IV ACCESS:018159353}    Nursing Mobility/ADLs:  Walking   {CHP DME WZUV:471962585}  Transfer  {CHP DME ULYC:588792486}  Bathing  {CHP DME RGKQ:309900709}  Dressing  {CHP DME IEXX:270455259}  Toileting  {CHP DME ZNW}  Feeding  {CHP DME USRD:463196115}  Med Admin  {CHP DME GDNU:206063795}  Med Delivery   { SARAH MED Delivery:979261398}    Wound Care Documentation and Therapy:  Incision 10/05/13 Other (Comment) Left (Active)   Number of days: 2937        Elimination:  Continence:   · Bowel: {YES / RZ:28877}  · Bladder: {YES / WK:50034}  Urinary Catheter: {Urinary Catheter:369143147}   Colostomy/Ileostomy/Ileal Conduit: {YES / WA:02140}       Date of Last BM: ***  No intake or output data in the 24 hours ending 10/20/21 1325  No intake/output data recorded.     Safety Concerns:     [address] [employer] Safety Concerns:275190848}    Impairments/Disabilities:      [address] Impairments/Disabilities:305295394}    Nutrition Therapy:  Current Nutrition Therapy:   [address] [employer] Diet List:951300083}    Routes of Feeding: {CHP DME Other Feedings:536031323}  Liquids: {Slp liquid thickness:24227}  Daily Fluid Restriction: {CHP DME Yes amt example:808183395}  Last Modified Barium Swallow with Video (Video Swallowing Test): {Done Not Done DNLE:888676677}    Treatments at the Time of Hospital Discharge:   Respiratory Treatments: ***  Oxygen Therapy:  {Therapy; copd oxygen:14790}  Ventilator:    { CC Vent PWGL:983711602}    Rehab Therapies: {THERAPEUTIC INTERVENTION:3987178926}  Weight Bearing Status/Restrictions: [facility] [employer] Weight Bearin}  Other Medical Equipment (for information only, NOT a DME order):  {EQUIPMENT:937101561}  Other Treatments: ***    Patient's personal belongings (please select all that are sent with patient):  {CHP DME Belongings:326178453}    RN SIGNATURE:  {Esignature:909139461}    CASE MANAGEMENT/SOCIAL WORK SECTION    Inpatient Status Date: ***    Readmission Risk Assessment Score:  Readmission Risk              Risk of Unplanned Readmission:  0           Discharging to Facility/ Agency   · Name:   · Address:  · Phone:  · Fax:    Dialysis Facility (if applicable)   · Name:  · Address:  · Dialysis Schedule:  · Phone:  · Fax:    / signature: {Esignature:298322824}    PHYSICIAN SECTION    Prognosis: {Prognosis:8689340693}    Condition at Discharge: [address] [place] [address] Patient Condition:831555565}    Rehab Potential (if transferring to Rehab): {Prognosis:2026415201}    Recommended Labs or Other Treatments After Discharge: ***    Physician Certification: I certify the above information and transfer of Ailyn Gomez  is necessary for the continuing treatment of the diagnosis listed and that she requires {Admit to Appropriate Level of Care:38055} for {GREATER/LESS:674397493} 30 days.      Update Admission H&P: {CHP DME Changes in MASK}    PHYSICIAN SIGNATURE:  {Esignature:878075559}

## 2021-10-20 NOTE — H&P
History and Physical  Dr. Jackie Osborne  10/20/2021    PCP: Austin Nogueira    Cc:   Chief Complaint   Patient presents with    Shortness of Breath     in by EMS from home was d/c yesterday after being inpatient for 3 days, patient states it was related to sob from missing dialysis, patient reporting she has a severe panic disorder and when she cant breath she panics and it causes this response        HPI:  Eriberto Mejía is a 55 y.o. female who has a past medical history of Blind in both eyes, Cerebral artery occlusion with cerebral infarction ([address] CHF (congestive heart failure) ([address] Chronic kidney disease, Depression, Diabetes mellitus out of control ([address] Diabetes mellitus, type II ([address] Diabetic neuropathy associated with type 2 diabetes mellitus ([address] Generalized headaches, Hypertension, Infertility, Insomnia, Migraine headache, Mixed hyperlipidemia, Otitis media, Pelvic abscess in female, Pneumonia, Stroke ([address] and Stroke ([address] Patient presents with Epiglottitis. HPI  (1-3 for expanded problem focused, ?4 for detailed/comprehensive)     Eriberto Mejía is a 55 y.o. female with past medical history of CHF, CVA, diabetes, hypertension, end-stage renal disease on hemodialysis who presents the ED with complaint of shortness of breath. Patient arrived by EMS from home. Was just recently discharged from the hospital yesterday. Patient states she was admitted for several days secondary to shortness of breath they thought was due to missing dialysis. Patient states when she got home she felt like she could not breathe and like she had some swelling to her throat. Patient states she started having some anxiety and panic. Patient states she does have history of panic attacks and states when she panic she has difficulty breathing. Review of old chart does show that pt has hx of panic attacks that leads to dyspnea    Prev admit also shows some poss stridor; this did get better with some racemic epi.  Was seen by pulm at that time and they did not recommend further workup    CT Neck from ER shows  Impression:        1. Asymmetric thickening of the right aryepiglottic fold may reflect   underlying edema and supraglottitis, however underlying mass not excluded. In addition, there is a suspected vocal cord polyp protruding into the   larynx.  Direct visual inspection is recommended. 2. No cervical lymphadenopathy. Given this finding, ENT has been asked to see pt  Dr Gabby Aguilar recommends admission  Iv abx and iv steroids to be given  Plan for scope in AM to visualize      Problem list of hospitalization thus far: Active Hospital Problems    Diagnosis     Epiglottitis [J05.10]     ESRD (end stage renal disease) [address] [N18.6]     HTN (hypertension), benign [I10]     Type 2 diabetes mellitus, with long-term current use of insulin (HCC) [E11.9, Z79.4]          Review of Systems: (1 system for EPF, 2-9 for detailed, 10+ for comprehensive)  Constitutional: Negative for chills and fever. HENT: Negative for dental problem, nosebleeds and rhinorrhea. Eyes: Negative for photophobia and visual disturbance. Respiratory: Negative for cough, chest tightness . Positive for shortness of breath. And some wheezing  Cardiovascular: Negative for chest pain and leg swelling. Gastrointestinal: Negative for diarrhea, nausea and vomiting. Endocrine: Negative for polydipsia and polyphagia. Genitourinary: Negative for frequency, hematuria and urgency. Musculoskeletal: Negative for back pain and myalgias. Skin: Negative for rash. Allergic/Immunologic: Negative for food allergies. Neurological: Negative for dizziness, seizures, syncope and facial asymmetry. Hematological: Negative for adenopathy. Psychiatric/Behavioral: Negative for dysphoric mood. The patient is not nervous/anxious.              Past Medical History:   Past Medical History:   Diagnosis Date    Blind in both eyes     detailed, 2-3 for comprehensive)      Code Status: Prior    Meds - following list of home medications fromelectronic chart has been reviewed by myself  Prior to Admission medications    Medication Sig Start Date End Date Taking? Authorizing Provider   LORazepam (ATIVAN) 0.5 MG tablet Take 0.5 mg by mouth every 8 hours as needed for Anxiety. Historical Provider, MD   furosemide (LASIX) 80 MG tablet Take 80 mg by mouth daily    Historical Provider, MD   insulin glargine (LANTUS SOLOSTAR) 100 UNIT/ML injection pen Inject 20 Units into the skin every morning     Historical Provider, MD   lisinopril (PRINIVIL;ZESTRIL) 40 MG tablet Take 40 mg by mouth daily    Historical Provider, MD   clonazePAM (KLONOPIN) 0.5 MG tablet Take 0.5 mg by mouth 2 times daily as needed.   8/20/21   Historical Provider, MD   glycopyrrolate-formoterol (Roj[place] Pancoast) 9-4.8 MCG/ACT AERO Inhale 2 puffs into the lungs 2 times daily 9/23/21   Damon Mireles   cloNIDine (CATAPRES) 0.2 MG/24HR PTWK Place 1 patch onto the skin once a week 9/17/21   Lev Sin MD   glucose (GLUTOSE) 40 % GEL Take 37.5 mLs by mouth as needed (low blood sugar) 9/13/21   Lev Sin MD   FLUoxetine (PROZAC) 20 MG capsule Take 1 capsule by mouth daily 9/13/21   Lev Sin MD   Dulaglutide 0.75 MG/0.5ML SOPN Inject 0.75 mg into the skin once a week 7/8/21   Damon Mireles   amLODIPine (NORVASC) 10 MG tablet Take 1 tablet by mouth daily 7/8/21   Damon Mireles   spironolactone (ALDACTONE) 50 MG tablet Take 1 tablet by mouth daily 7/8/21   Damon Mireles   atorvastatin (LIPITOR) 40 MG tablet Take 1 tablet by mouth nightly 7/8/21   Damon Mireles   Insulin Pen Needle 29G X 12.7MM MISC 1 each by Does not apply route daily 7/8/21   Damon Mireles   propranolol (INDERAL LA) 80 MG extended release capsule Take 1 capsule by mouth every morning 7/8/21   Eulene Loveless   aspirin 81 MG EC tablet Take 1 tablet by mouth daily 9/1/20   Castro Gilmer Councilman, MD   pregabalin (LYRICA) 75 MG capsule Take 1 capsule by mouth nightly for 7 days. 8/31/20 10/4/21  Malou Dumont MD   insulin lispro, 1 Unit Dial, 100 UNIT/ML SOPN Inject 0-6 Units into the skin 3 times daily (with meals) **Corrective Low Dose Algorithm**  Glucose: Dose:               No Insulin  140-199 1 Unit  200-249 2 Units  250-299 3 Units  300-349 4 Units  350-399 5 Units  Over 399 6 Units 4/29/20   Kaelyn Raza MD         Allergies   Allergen Reactions    Amoxicillin Itching and Hives     Tolerates cephalosporins  Patient tolerating cefazolin (ANCEF) as of October 11, 2018  Patient tolerating cefazolin (ANCEF) as of October 11, 2018  Tolerates cephalosporins  Patient tolerating cefazolin (ANCEF) as of October 11, 2018    Levofloxacin Anaphylaxis    Vancomycin Shortness Of Breath, Hives and Anaphylaxis    Tape [Adhesive Tape] Other (See Comments)     Paper tape turns skin bright red. Plastic tape okay.               EXAM: (2-7 system for EPF/Detailed, ?8 for Comprehensive)  /72   Pulse 83   Temp 97.2 °F (36.2 °C) (Temporal)   Resp 16   Wt 185 lb (83.9 kg)   LMP 10/14/2021   SpO2 99%   BMI 28.98 kg/m²   Constitutional: vitals as above: alert, appears stated age and cooperative    Psychiatric: normal insight and judgment, oriented to person, place, time, and general circumstances    Head: Normocephalic, without obvious abnormality, atraumatic    Eyes:lids and lashes normal, conjunctivae and sclerae normal and pupils equal, round, reactive to light and accomodation    EMNT: external ears normal, nares midline    Neck: no carotid bruit, supple, symmetrical, trachea midline and thyroid not enlarged, symmetric, no tenderness/mass/nodules     Respiratory: clear to auscultation and percussion bilaterally with some labored breaths  Cardiovascular: normal rate, regular rhythm, normal S1 and S2 and no murmurs    Gastrointestinal: soft, non-tender, non-distended, normal bowel sounds, no masses or organomegaly    Extremities: no clubbing, 1+ edema    Skin:No rashes or nodules noted. Neurologic:negative         LABS:  Labs Reviewed   CBC WITH AUTO DIFFERENTIAL - Abnormal; Notable for the following components:       Result Value    WBC 19.7 (*)     RBC 3.62 (*)     Hemoglobin 10.3 (*)     Hematocrit 31.4 (*)     RDW 17.0 (*)     Neutrophils Absolute 14.7 (*)     Monocytes Absolute 1.8 (*)     All other components within normal limits    Narrative:     Performed at:  [facility]  [address]   Phone [phone]   COMPREHENSIVE METABOLIC PANEL W/ REFLEX TO MG FOR LOW K - Abnormal; Notable for the following components:    Chloride 98 (*)     Glucose 146 (*)     BUN 36 (*)     CREATININE 4.8 (*)     GFR Non- 10 (*)     GFR African American 12 (*)     Alkaline Phosphatase 144 (*)     ALT 9 (*)     All other components within normal limits    Narrative:     Performed at:  [facility] [email or web address]   Phone [phone]   TROPONIN - Abnormal; Notable for the following components:    Troponin 0.20 (*)     All other components within normal limits    Narrative:     Performed at:  [facility]  [employer]   Phone 21  - Abnormal; Notable for the following components:    Pro-BNP 19,370 (*)     All other components within normal limits    Narrative:     Performed at:  [facility]  [employer]   Phone [phone]   CULTURE, BLOOD 1   CULTURE, BLOOD 2   LACTATE, SEPSIS    Narrative:     Performed at:  [facility]  [address]   Phone [phone]   LACTATE, SEPSIS         IMAGING:  Imaging results from the ER have been reviewed in the computerized chart.   XR NECK SOFT TISSUE    Result Date: 10/20/2021  EXAMINATION: TWO XRAY VIEWS OF THE NECK SOFT TISSUES 10/20/2021 10:35 am COMPARISON: 10/16/2021 HISTORY: ORDERING SYSTEM PROVIDED HISTORY: stridor TECHNOLOGIST PROVIDED HISTORY: Reason for exam:->stridor Reason for Exam: Shortness of Breath (in by EMS from home was d/c yesterday after being inpatient for 3 days, patient states it was related to sob from missing dialysis, patient reporting she has a severe panic disorder and when she cant breath she panics and it causes this response ) Acuity: Acute Type of Exam: Initial FINDINGS: Epiglottis again appears normal.  No foreign body. No significant soft tissue swelling. Mild narrowing of the glottis again noted. Mild narrowing the glottis again noted Otherwise, unremarkable soft tissues of the neck     XR NECK SOFT TISSUE    Result Date: 10/16/2021  EXAMINATION: TWO XRAY VIEWS OF THE NECK SOFT TISSUES 10/16/2021 10:32 pm COMPARISON: None. HISTORY: ORDERING SYSTEM PROVIDED HISTORY: stridor TECHNOLOGIST PROVIDED HISTORY: Reason for exam:->stridor FINDINGS: The upper airway is patent. The retropharyngeal soft tissues and epiglottis are not thickened. There is narrowing at the glottis. No radiopaque foreign bodies are seen over the airway. Retropharyngeal soft tissues and epiglottis appear normal.  There is some narrowing at the glottis. CT SOFT TISSUE NECK W CONTRAST    Result Date: 10/20/2021  EXAMINATION: CT OF THE NECK SOFT TISSUE WITH CONTRAST  10/20/2021 TECHNIQUE: CT of the neck was performed with the administration of intravenous contrast. Multiplanar reformatted images are provided for review. Dose modulation, iterative reconstruction, and/or weight based adjustment of the mA/kV was utilized to reduce the radiation dose to as low as reasonably achievable.  COMPARISON: CTA head and neck 08/27/2020 HISTORY: ORDERING SYSTEM PROVIDED HISTORY: epiglottitis TECHNOLOGIST PROVIDED HISTORY: Reason for exam:->epiglottitis Decision Support Exception - unselect if not a suspected or confirmed emergency medical condition->Emergency Medical Condition (MA) Reason for Exam: headache Acuity: Unknown Type of Exam: Unknown FINDINGS: PHARYNX/LARYNX: Nasopharynx and oropharynx appear normal.Parapharyngeal tissue planes are preserved. Oral cavity and floor of mouth appear normal within constraints of artifact from dental amalgam. spaces appear normal.The hypopharynx and infraglottic trachea are unremarkable. There is asymmetric thickening of the right aryepiglottic fold. There is a suspected vocal cord polyp protruding into the larynx (series 2, image 64 and series 602, image 77). Imaged upper esophagus is unremarkable. SALIVARY GLANDS/THYROID:The parotid and submandibular glands appear unremarkable. Again seen is a multinodular thyroid with a dominant 16 mm left thyroid lobe nodule. LYMPH NODES: No cervical or supraclavicular lymphadenopathy is seen. SOFT TISSUES: No appreciable soft tissue swelling. No mass within carotid spaces. Patent extracranial carotid systems and internal jugular veins bilaterally. BRAIN/ORBITS/SINUSES: No abnormal intracranial enhancement, mass effect, or hydrocephalus within the imaged brain. Orbital soft tissue planes are preserved. Imaged paranasal sinuses are clear. Mastoid air cells and middle ear cavities are clear. LUNG APICES/SUPERIOR MEDIASTINUM:Imaged lung apices are clear of focal consolidation or mass. Partially imaged right IJ central venous catheter. BONES:  No aggressive appearing lytic or blastic bony lesion. No significant spondylotic changes in the visualized spine. 1. Asymmetric thickening of the right aryepiglottic fold may reflect underlying edema and supraglottitis, however underlying mass not excluded. In addition, there is a suspected vocal cord polyp protruding into the larynx. Direct visual inspection is recommended. 2. No cervical lymphadenopathy.  3. Multinodular thyroid gland again noted with a dominant left 16 mm nodule. Nonemergent thyroid ultrasound should be considered. XR CHEST PORTABLE    Result Date: 10/20/2021  EXAMINATION: ONE XRAY VIEW OF THE CHEST 10/20/2021 10:35 am COMPARISON: 10/16/2021 HISTORY: ORDERING SYSTEM PROVIDED HISTORY: sob TECHNOLOGIST PROVIDED HISTORY: Reason for exam:->sob Reason for Exam: Shortness of Breath (in by EMS from home was d/c yesterday after being inpatient for 3 days, patient states it was related to sob from missing dialysis, patient reporting she has a severe panic disorder and when she cant breath she panics and it causes this response ) Acuity: Acute Type of Exam: Initial FINDINGS: Right-sided central venous catheter remains in place. The lungs are without acute focal process. There is no effusion or pneumothorax. The cardiomediastinal silhouette is stable. The osseous structures are stable. No acute process. Stable cardiomegaly     XR CHEST PORTABLE    Result Date: 10/16/2021  EXAMINATION: ONE XRAY VIEW OF THE CHEST 10/16/2021 7:06 pm COMPARISON: None. HISTORY: ORDERING SYSTEM PROVIDED HISTORY: SOB TECHNOLOGIST PROVIDED HISTORY: Reason for exam:->SOB Reason for Exam: SOB Acuity: Acute Type of Exam: Initial FINDINGS: There is a right subclavian catheter with its tip in the right atrium. The cardiomediastinal silhouette is mildly enlarged. There is mild pulmonary vascular congestion. There is no pleural effusion. There is no pneumothorax. There is no acute osseous abnormality. Mild pulmonary vascular congestion. Mild cardiomegaly. XR CHEST PORTABLE    Result Date: 10/6/2021  EXAMINATION: ONE XRAY VIEW OF THE CHEST 10/4/2021 2:33 pm COMPARISON: 10/03/2021 HISTORY: ORDERING SYSTEM PROVIDED HISTORY: SOB TECHNOLOGIST PROVIDED HISTORY: Reason for exam:->SOB Reason for Exam: Shortness of breath. Acuity: Acute Type of Exam: Initial FINDINGS: Worsening multifocal airspace consolidation in both lungs.   Findings suggest worsening pneumonia. Removal of ET tube since prior. A right central line with tips in the SVC. No pneumothorax. No pleural effusion. Cardiomediastinal silhouette and bony thorax are unchanged. Extensive multifocal airspace disease in both lungs worsening since prior examination suggesting worsening pneumonia. CT CHEST PULMONARY EMBOLISM W CONTRAST    Result Date: 10/16/2021  EXAMINATION: CTA OF THE CHEST 10/16/2021 8:43 pm TECHNIQUE: CTA of the chest was performed after the administration of intravenous contrast.  Multiplanar reformatted images are provided for review. MIP images are provided for review. Dose modulation, iterative reconstruction, and/or weight based adjustment of the mA/kV was utilized to reduce the radiation dose to as low as reasonably achievable. COMPARISON: None. HISTORY: ORDERING SYSTEM PROVIDED HISTORY: pe- dialysis patient - okay to contrast TECHNOLOGIST PROVIDED HISTORY: Reason for exam:->pe- dialysis patient - okay to contrast Decision Support Exception - unselect if not a suspected or confirmed emergency medical condition->Emergency Medical Condition (MA) Reason for Exam: Shortness of Breath (SOB with audbile wheezes for over one week. Denies pulmonary hx or exposure to recent illness; quit smoking 8 weeks ago & has anxiety. Reports was in the ICU for unknown reason & was placed on vent in August 2021; seen on 10/4 & needed blood transfusion.  ) FINDINGS: Pulmonary Arteries: Pulmonary arteries are adequately opacified for evaluation. No evidence of intraluminal filling defect to suggest pulmonary embolism. Main pulmonary artery is normal in caliber. Mediastinum: The heart is enlarged. The thoracic aorta and proximal great vessels are patent and of normal caliber. No pericardial effusion. No enlarged or suspicious axillary, hilar, or mediastinal lymphadenopathy. The thyroid gland is mildly enlarged and somewhat heterogeneous.   The right jugular vein hemodialysis catheter ends in the upper right atrium. Lungs/pleura: The trachea and mainstem bronchi are widely patent. There is interlobular septal thickening with diffuse bilateral lower lobe airspace disease and ground-glass opacity. No cavitary lesions. No discrete pulmonary nodule or mass. Moderate right and small left pleural effusions. No pericardial effusion. No pneumothorax. Soft Tissues/Bones: Degenerative changes are present throughout the thoracic spine. Upper Abdomen: The visualized upper abdomen is unremarkable. No evidence of pulmonary embolism or acute thoracic aortic abnormality. Cardiomegaly with moderate pulmonary vascular congestive change, moderate right, and small left pleural effusions. Minimal superimposed bibasilar atelectasis. No significant pericardial effusion. EKG:   EKG from ER, reviewed by self - it shows normal sinus ryhthm at 93. No acute st elevation noted. Old chart reviewed, EKG dated 10/16/21 is reviewed, there is not difference noted. Old study shows NSR at rate 84. Lab Results   Component Value Date    GLUCOSE 146 10/20/2021     Lab Results   Component Value Date    POCGLU 92 10/19/2021     /72   Pulse 83   Temp 97.2 °F (36.2 °C) (Temporal)   Resp 16   Wt 185 lb (83.9 kg)   LMP 10/14/2021   SpO2 99%   BMI 28.98 kg/m²     MEDICAL DECISION MAKING:    Principal Problem:    Epiglottitis -New Problem to me. Poss epiglottitis as seen on ct scan  Plan:  Admit, iv decadron, iv clinda. ENT to see. Likely will need scope in AM. Will make NPO p MN for this  Active Problems:    Type 2 diabetes mellitus, with long-term current use of insulin ([address] -Established problem. Stable. Glu 92  Plan: Patient placed on controlled carbohydrate diet. Fingerstick sugars to be checked to monitor for both hypoglycemia as well as hyperglycemia. Sliding scale insulin ordered. Glucagon and dextrose ordered for hypoglycemia. Patient will be continued on home medications.  Hemoglobin a1c to be ordered to assess efficacy of therapy. HTN (hypertension), benign -Established problem. Stable. 138/72  Plan: Pt home BP meds reviewed and will be continued. IV Hydralazine ordered for control of extremely high blood pressures. Will monitor labs to assess Creat/K for possible complications of medications. ESRD (end stage renal disease) ([facility]  Plan: Dr Gayle Mcgee notified on admit. I believe today is her normal HD day          Diagnoses as listed above, designated as new or established and plan outlined for each. Data Reviewed:   (1) Lab tests were reviewed or ordered. (1) Radiology tests were reviewed or ordered. (1) Medical test (Echo, EKG, PFT/ashley) were ordered. (1)History was not obtained from someone other than patient  (1) Old records were reviewed - see HPI/MDM for pertinent details if review done. (2) Case was discussed with another health care provider: [facility]  (2) Imaging was viewed by myself. Ct neck  (2) EKG  was viewed by myself. The patient is being placed in inpatient status with the expectation of requiring a hospital stay spanning at least two midnights for care and treatment of the problems noted in the problem list.  This determination is also based on thepatients comorbidities and past medical history, the severity and timing of the signs and symptoms upon presentation.         Electronically signed by: Jyoti Merrill MD 10/20/2021

## 2021-10-20 NOTE — PROGRESS NOTES
AE (above voice box) edema/inflammation, R side vocal cord not moving, not clear yet if d/t paralysis or immobilized d/t inflammation. Per  at bedside post scope.

## 2021-10-20 NOTE — TELEPHONE ENCOUNTER
Writer contacted Rosmery Mobley (nurse),  ED provider to inform of 30 day readmission risk. Provider was not available. No Decision on disposition at this time. Call back number provided to nurse.

## 2021-10-20 NOTE — PROGRESS NOTES
Treatment time: 3    Net UF: 3 L    Pre weight: unable to weigh  Post weight: unable to weigh  EDW: 83 kg    Access used: TDC  Access function: good, dressing changed    Medications or blood products given: none    Regular outpatient schedule: MW    Summary of response to treatment: Tolerated HD with no issues, Dr. Mark Rodriguez aware on HD    Report called to [facility]

## 2021-10-20 NOTE — ED NOTES
Bed: 26  Expected date:   Expected time:   Means of arrival:   Comments:  Jhonatan Brown RN  10/20/21 101

## 2021-10-20 NOTE — CONSULTS
[address] [facility]       Otolaryngology Consultation      Requesting Physician / Provider:  Samy Wiggins MD       Date of Consultation:  10/20/2020      History Obtained From:  patient, electronic medical record      [address] / Reason for Consult:  poss epiglottitis      HISTORY OF PRESENT ILLNESS:    Beba Hernandez is a 55 y.o. female is a new patient to me. An otolaryngologic consultation was requested for evaluation of upper aerodigestive tract for possible epiglottis/supraglottitis. She was admitted for stridor and possible epiglottitis. She is being treated with clindamycin. REVIEW OF SYSTEMS:    I reviewed the ROS in the admission history and physical by Dr. Darlin Glass on 10/10/2021 and there has been no change in the information. Past Medical History:       Diagnosis Date    Blind in both eyes     Cerebral artery occlusion with cerebral infarction ([address]     CHF (congestive heart failure) (HCC)     Chronic kidney disease     Depression     Diabetes mellitus out of control ([address]     Diabetes mellitus, type II ([address]     2005    Diabetic neuropathy associated with type 2 diabetes mellitus ([address]     Generalized headaches     Hypertension     Infertility     Insomnia     chronic vs lack of time spent to sleep    Migraine headache 11/09/2011    Mixed hyperlipidemia     Otitis media     h/o recurrent    Pelvic abscess in female 10/05/2013    Pneumonia     2004 approx.     Stroke ([address] 08/27/2020    Stroke ([address] 08/27/2021       Past Surgical History:        Procedure Laterality Date    CERVIX SURGERY      laser tx for dysplasia;1992    EYE SURGERY      FOOT SURGERY Right     FOOT SURGERY Bilateral     FOOT SURGERY Left     IR TUNNELED CATHETER PLACEMENT GREATER THAN 5 YEARS  9/7/2021    IR TUNNELED CATHETER PLACEMENT GREATER THAN 5 YEARS 9/7/2021 Demi Bucio MD [facility] SPECIAL PROCEDURES       Current Medications:   Current Facility-Administered Medications: insulin lispro (1 Unit Dial) 0-6 Units, 0-6 Units, SubCUTAneous, TID WC  aspirin EC tablet 81 mg, 81 mg, Oral, Daily  pregabalin (LYRICA) capsule 75 mg, 75 mg, Oral, Nightly  Dulaglutide SOPN 0.75 mg, 0.75 mg, SubCUTAneous, Weekly  amLODIPine (NORVASC) tablet 10 mg, 10 mg, Oral, Daily  spironolactone (ALDACTONE) tablet 50 mg, 50 mg, Oral, Daily  atorvastatin (LIPITOR) tablet 40 mg, 40 mg, Oral, Nightly  Insulin Pen Needle MISC 1 each, 1 each, Does not apply, Daily  [START ON 10/21/2021] propranolol (INDERAL LA) extended release capsule 80 mg, 80 mg, Oral, QAM  cloNIDine (CATAPRES) 0.2 MG/24HR 1 patch, 1 patch, TransDERmal, Weekly  glucose (GLUTOSE) 40 % oral gel 15 g, 15 g, Oral, PRN  FLUoxetine (PROZAC) capsule 20 mg, 20 mg, Oral, Daily  clonazePAM (KLONOPIN) tablet 0.5 mg, 0.5 mg, Oral, BID PRN  tiotropium-olodaterol (STIOLTO) 2.5-2.5 MCG/ACT inhaler 2 puff, 2 puff, Inhalation, Daily  furosemide (LASIX) tablet 80 mg, 80 mg, Oral, Daily  [START ON 10/21/2021] insulin glargine (LANTUS;BASAGLAR) injection pen 20 Units, 20 Units, SubCUTAneous, QAM  lisinopril (PRINIVIL;ZESTRIL) tablet 40 mg, 40 mg, Oral, Daily  LORazepam (ATIVAN) tablet 0.5 mg, 0.5 mg, Oral, Q8H PRN  0.9 % sodium chloride infusion, , IntraVENous, Continuous  sodium chloride flush 0.9 % injection 5-40 mL, 5-40 mL, IntraVENous, 2 times per day  sodium chloride flush 0.9 % injection 5-40 mL, 5-40 mL, IntraVENous, PRN  0.9 % sodium chloride infusion, 25 mL, IntraVENous, PRN  ondansetron (ZOFRAN-ODT) disintegrating tablet 4 mg, 4 mg, Oral, Q8H PRN **OR** ondansetron (ZOFRAN) injection 4 mg, 4 mg, IntraVENous, Q6H PRN  magnesium hydroxide (MILK OF MAGNESIA) 400 MG/5ML suspension 30 mL, 30 mL, Oral, Daily PRN  acetaminophen (TYLENOL) tablet 650 mg, 650 mg, Oral, Q6H PRN **OR** acetaminophen (TYLENOL) suppository 650 mg, 650 mg, Rectal, Q6H PRN  hydrALAZINE (APRESOLINE) injection 10 mg, 10 mg, IntraVENous, Q6H PRN  0.9 % sodium chloride bolus, 500 mL, IntraVENous, PRN  insulin lispro (1 Unit Dial) 0-12 Units, 0-12 Units, SubCUTAneous, TID WC  insulin lispro (1 Unit Dial) 0-6 Units, 0-6 Units, SubCUTAneous, Nightly  glucose (GLUTOSE) 40 % oral gel 15 g, 15 g, Oral, PRN  dextrose 50 % IV solution, 12.5 g, IntraVENous, PRN  glucagon (rDNA) injection 1 mg, 1 mg, IntraMUSCular, PRN  dextrose 5 % solution, 100 mL/hr, IntraVENous, PRN  [START ON 10/21/2021] dexamethasone (DECADRON) injection 4 mg, 4 mg, IntraVENous, Q12H  clindamycin (CLEOCIN) 600 mg in dextrose 5 % 50 mL IVPB, 600 mg, IntraVENous, Q8H  heparin (porcine) injection 3,600 Units, 3,600 Units, IntraCATHeter, PRN  heparin (porcine) injection 5,000 Units, 5,000 Units, SubCUTAneous, 3 times per day    Allergies: Allergies   Allergen Reactions    Amoxicillin Itching and Hives     Tolerates cephalosporins  Patient tolerating cefazolin (ANCEF) as of October 11, 2018  Patient tolerating cefazolin (ANCEF) as of October 11, 2018  Tolerates cephalosporins  Patient tolerating cefazolin (ANCEF) as of October 11, 2018    Levofloxacin Anaphylaxis    Vancomycin Shortness Of Breath, Hives and Anaphylaxis    Tape [Adhesive Tape] Other (See Comments)     Paper tape turns skin bright red. Plastic tape okay. Social History:    TOBACCO:   reports that she has quit smoking. Her smoking use included cigarettes. She smoked 1.00 pack per day. She has never used smokeless tobacco.  ETOH:   reports no history of alcohol use.     Family History:       Problem Relation Age of Onset    Diabetes Mother    Mustapha Sibley Other Mother 79        Covid    Diabetes Father     High Blood Pressure Father     Colon Cancer Father     Diabetes Sister     Alcohol Abuse Maternal Grandfather     Diabetes Paternal Grandmother     Alcohol Abuse Paternal Grandfather     Diabetes Paternal Aunt     Diabetes Paternal Uncle            PHYSICAL EXAM:      Constitutional:    Vitals:    Vitals:    10/20/21 1800   BP: 121/83   Pulse: 73 Resp: 16   Temp: 97.1 °F (36.2 °C)   SpO2: 98%        General appearance:  WDWN NAD, awake and alert. PROCEDURE PERFORMED:  Flexible fiberoptic nasopharyngolaryngoscopy     PRIMARY INDICATION:  Stridor and possible epiglottis/supraglottitis. Need for detailed examination of the larynx and pharynx. ANESTHESIA:  Topical, with a half and half mixture of topical 4% lidocaine with 0.05% oxymetazoline solution, by nasal sprayer. INFORMED CONSENT:  Edgardo Pollard was advised of the medical necessity for this procedure, which was described. The attendant risks and potential complications were discussed, including, but not limited to bleeding, infection, adverse reaction to medications, hoarseness, sore throat, inability to obtain adequate visualization, need for rigid operative endoscopy, airway obstruction, need for intubation or tracheotomy, and death. The expected outcome, potential benefits and the alternatives of therapy were discussed. Patient asked appropriate questions before denying having any further questions, confirming understanding and acceptance of all of the preceding, and stating the desire to undergo with this procedure, granting verbal informed consent. FINDINGS:  Visualization was excellent throughout the examination. There was a fullness in the right AE fold and paralysis of the right vocal cord in the paramedian position. The left vocal cord was normally mobile with midline approximation on phonation, with no glottic gap and with normal phonation. Sensation of the hypopharynx and larynx appeared to be normal when touched by the end of the flexible scope. The nasopharynx, oropharynx, base of tongue, hypopharynx, and piriform sinuses all appeared normal, with no other lesions or abnormalities apparent. DESCRIPTION OF PROCEDURE:   The right nasal cavity was sprayed with the anesthetic-decongestant solution.   After an appropriate elapse of time, the flexible fiberoptic nasopharyngolaryngoscopy was inserted through that nares and advanced to the nasopharynx and then to the hypopharynx and larynx. After adequate endoscopic visualization, the endoscope was removed. The patient appeared to tolerate the procedure well with no evidence of perioperative complications. COMPLICATIONS:  none      IMPRESSION:  1. Inflammation and possible mass/cyst in the right aryepiglottic fold. 2. Right vocal cord paralysis/paresis, possible secondary to inflammation versus effect of mass. 3. Airway patent and adequate. RECOMMENDATIONS AND PLAN:    1. Okay for discharge from ENT standpoint. 2. Continue oral antibiotic for a 10 day course. Follow up with me as outpatient one week after discharge.

## 2021-10-20 NOTE — ED NOTES
Patient arrived via EMS from home, pt was discharged from here yesterday to home after being admitted for 3 days, patient reporting that she was admitted for SOB related to missing dialysis, patient also reporting that she has a severe panic disorder and when she can't breath it makes her sound like this: which sounds very stridoris. Patient on RA was 95%, however was holding breath while bp cuff went off and spo2 Dropped to 90%, placed on 2 liters per nasal cannula. EKG completed on arrival, IV placed, and blood sent to lab, medicated per [facility], xray at bedside now. Will continue to monitor.       Liss López RN  10/20/21 [id number]

## 2021-10-20 NOTE — ED PROVIDER NOTES
[address] [facility]        Pt Name: Radha Thomson  MRN: [id number]  [place] 1975  Date of evaluation: 10/20/2021  Provider: DEE Gongora  PCP: Nasir Canseco  Note Started: 1:15 PM EDT        I have seen and evaluated this patient with my supervising physician Donaldo Montague MD.    [address] [place] [address]       Chief Complaint   Patient presents with    Shortness of Breath     in by EMS from home was d/c yesterday after being inpatient for 3 days, patient states it was related to sob from missing dialysis, patient reporting she has a severe panic disorder and when she cant breath she panics and it causes this response        HISTORY OF PRESENT ILLNESS   (Location, Timing/Onset, Context/Setting, Quality, Duration, Modifying Factors, Severity, Associated Signs and Symptoms)  Note limiting factors. Chief Complaint: Shortness of breath    Radha Thomson is a 55 y.o. female with past medical history of CHF, CVA, diabetes, hypertension, end-stage renal disease on hemodialysis who presents the ED with complaint of shortness of breath. Patient arrived by EMS from home. Was just recently discharged from the hospital yesterday. Patient states she was admitted for several days secondary to shortness of breath they thought was due to missing dialysis. Patient states when she got home she felt like she could not breathe and like she had some swelling to her throat. Patient states she started having some anxiety and panic. Patient states she does have history of panic attacks and states when she panic she has difficulty breathing. Patient states now she feels like she cannot get air through her throat. Denies any chest pain, abdominal pain, leg swelling, nausea/vomiting, orthopnea, pedal edema, calf tenderness, fever/chills, cough or hemoptysis. Denies any rashes or lesions.   Denies any drooling, trismus or changes in abscess in female 10/05/2013    Pneumonia     2004 approx.  Stroke ([facility] [address] 08/27/2020    Stroke ([facility] [address] 08/27/2021         SURGICAL HISTORY     Past Surgical History:   Procedure Laterality Date    CERVIX SURGERY      laser tx for dysplasia;1992    EYE SURGERY      FOOT SURGERY Right     FOOT SURGERY Bilateral     FOOT SURGERY Left     IR TUNNELED CATHETER PLACEMENT GREATER THAN 5 YEARS  9/7/2021    IR TUNNELED CATHETER PLACEMENT GREATER THAN 5 YEARS 9/7/2021 Marcie Calderon MD [facility] SPECIAL PROCEDURES         CURRENTMEDICATIONS       Previous Medications    AMLODIPINE (NORVASC) 10 MG TABLET    Take 1 tablet by mouth daily    ASPIRIN 81 MG EC TABLET    Take 1 tablet by mouth daily    ATORVASTATIN (LIPITOR) 40 MG TABLET    Take 1 tablet by mouth nightly    CLONAZEPAM (KLONOPIN) 0.5 MG TABLET    Take 0.5 mg by mouth 2 times daily as needed.      CLONIDINE (CATAPRES) 0.2 MG/24HR PTWK    Place 1 patch onto the skin once a week    DULAGLUTIDE 0.75 MG/0.5ML SOPN    Inject 0.75 mg into the skin once a week    FLUOXETINE (PROZAC) 20 MG CAPSULE    Take 1 capsule by mouth daily    FUROSEMIDE (LASIX) 80 MG TABLET    Take 80 mg by mouth daily    GLUCOSE (GLUTOSE) 40 % GEL    Take 37.5 mLs by mouth as needed (low blood sugar)    GLYCOPYRROLATE-FORMOTEROL (BEVESPI AEROSPHERE) 9-4.8 MCG/ACT AERO    Inhale 2 puffs into the lungs 2 times daily    INSULIN GLARGINE (LANTUS SOLOSTAR) 100 UNIT/ML INJECTION PEN    Inject 20 Units into the skin every morning     INSULIN LISPRO, 1 UNIT DIAL, 100 UNIT/ML SOPN    Inject 0-6 Units into the skin 3 times daily (with meals) **Corrective Low Dose Algorithm**  Glucose: Dose:               No Insulin  140-199 1 Unit  200-249 2 Units  250-299 3 Units  300-349 4 Units  350-399 5 Units  Over 399 6 Units    INSULIN PEN NEEDLE 29G X 12.7MM MISC    1 each by Does not apply route daily    LISINOPRIL (PRINIVIL;ZESTRIL) 40 MG TABLET    Take 40 mg by mouth daily    LORAZEPAM (ATIVAN) 0.5 MG TABLET    Take 0.5 mg by mouth every 8 hours as needed for Anxiety. PREGABALIN (LYRICA) 75 MG CAPSULE    Take 1 capsule by mouth nightly for 7 days. PROPRANOLOL (INDERAL LA) 80 MG EXTENDED RELEASE CAPSULE    Take 1 capsule by mouth every morning    SPIRONOLACTONE (ALDACTONE) 50 MG TABLET    Take 1 tablet by mouth daily         ALLERGIES     Amoxicillin, Levofloxacin, Vancomycin, and Tape [adhesive tape]    FAMILYHISTORY       Family History   Problem Relation Age of Onset    Diabetes Mother    Farhana Cerrato Other Mother 79        Covid    Diabetes Father     High Blood Pressure Father     Colon Cancer Father     Diabetes Sister     Alcohol Abuse Maternal Grandfather     Diabetes Paternal Grandmother     Alcohol Abuse Paternal Grandfather     Diabetes Paternal Aunt     Diabetes Paternal Uncle           SOCIAL HISTORY       Social History     Tobacco Use    Smoking status: Former Smoker     Packs/day: 1.00     Types: Cigarettes    Smokeless tobacco: Never Used    Tobacco comment: Quit in August 2021   Vaping Use    Vaping Use: Never used   Substance Use Topics    Alcohol use: No     Comment: Very Rare    Drug use: No       SCREENINGS             PHYSICAL EXAM    (up to 7 for level 4, 8 or more for level 5)     ED Triage Vitals [10/20/21 1018]   BP Temp Temp src Pulse Resp SpO2 Height Weight   (!) 167/94 98.5 °F (36.9 °C) -- 62 19 96 % -- 185 lb (83.9 kg)       Physical Exam  Constitutional:       General: She is not in acute distress. Appearance: Normal appearance. She is well-developed. She is not ill-appearing, toxic-appearing or diaphoretic. HENT:      Head: Normocephalic and atraumatic. Right Ear: External ear normal.      Left Ear: External ear normal.      Mouth/Throat:      Mouth: Mucous membranes are moist.      Pharynx: No oropharyngeal exudate or posterior oropharyngeal erythema. Comments: No drooling, trismus, or changes in phonation noted. Uvula is midline.   Tonsillar pillar symmetrical bilaterally without evidence of edema, ecchymosis, erythema or warmth. Eyes:      General:         Right eye: No discharge. Left eye: No discharge. Conjunctiva/sclera: Conjunctivae normal.   Cardiovascular:      Rate and Rhythm: Normal rate and regular rhythm. Pulses: Normal pulses. Heart sounds: Normal heart sounds. No murmur heard. No friction rub. No gallop. Comments: 2+ radial pulses bilaterally. No pedal edema. No calf tenderness. No JVD. Pulmonary:      Effort: Respiratory distress present. Breath sounds: Normal breath sounds. No stridor. No wheezing, rhonchi or rales. Comments: Patient arrived on nonrebreather. Taken off nonrebreather and patient's oxygen in the upper 90s on room air. She was placed on 2 L nasal cannula oxygen for patient comfort. Patient does have some intermittent stridor at that is present here in the emergency department. Is not constant. Chest:      Chest wall: No tenderness. Abdominal:      General: Abdomen is flat. There is no distension. Palpations: Abdomen is soft. There is no mass. Tenderness: There is no abdominal tenderness. There is no right CVA tenderness, left CVA tenderness, guarding or rebound. Hernia: No hernia is present. Musculoskeletal:         General: Normal range of motion. Cervical back: Normal range of motion and neck supple. No rigidity or tenderness. Lymphadenopathy:      Cervical: No cervical adenopathy. Skin:     General: Skin is warm and dry. Coloration: Skin is not pale. Findings: No erythema or rash. Neurological:      Mental Status: She is alert and oriented to person, place, and time.    Psychiatric:         Behavior: Behavior normal.         DIAGNOSTIC RESULTS   LABS:    Labs Reviewed   CBC WITH AUTO DIFFERENTIAL - Abnormal; Notable for the following components:       Result Value    WBC 19.7 (*)     RBC 3.62 (*)     Hemoglobin 10.3 (*)     Hematocrit 31.4 (*)     RDW 17.0 (*)     Neutrophils Absolute 14.7 (*)     Monocytes Absolute 1.8 (*)     All other components within normal limits    Narrative:     Performed at:  [facility] [email or web address]   Phone [phone]   COMPREHENSIVE METABOLIC PANEL W/ REFLEX TO MG FOR LOW K - Abnormal; Notable for the following components:    Chloride 98 (*)     Glucose 146 (*)     BUN 36 (*)     CREATININE 4.8 (*)     GFR Non- 10 (*)     GFR African American 12 (*)     Alkaline Phosphatase 144 (*)     ALT 9 (*)     All other components within normal limits    Narrative:     Performed at:  [facility] [address]   Phone [phone]   TROPONIN - Abnormal; Notable for the following components:    Troponin 0.20 (*)     All other components within normal limits    Narrative:     Performed at:  [facility] [address]   Phone 21  - Abnormal; Notable for the following components:    Pro-BNP 19,370 (*)     All other components within normal limits    Narrative:     Performed at:  [facility] [address]   Phone [phone]   CULTURE, BLOOD 1   CULTURE, BLOOD 2   LACTATE, SEPSIS    Narrative:     Performed at:  [facility] [email or web address]   Phone [phone]   LACTATE, SEPSIS       When ordered only abnormal lab results are displayed. All other labs were within normal range or not returned as of this dictation. EKG: When ordered, EKG's are interpreted by the Emergency Department Physician in the absence of a cardiologist.  Please see their note for interpretation of EKG.     RADIOLOGY:   Non-plain film images such as CT, Ultrasound and MRI are read by the radiologist. Plain radiographic images are visualized and preliminarily interpreted by the ED Provider with the below findings:        Interpretation per the Radiologist below, if available at the time of this note:    CT SOFT TISSUE NECK W CONTRAST   Final Result   1. Asymmetric thickening of the right aryepiglottic fold may reflect   underlying edema and supraglottitis, however underlying mass not excluded. In addition, there is a suspected vocal cord polyp protruding into the   larynx. Direct visual inspection is recommended. 2. No cervical lymphadenopathy. 3. Multinodular thyroid gland again noted with a dominant left 16 mm nodule. Nonemergent thyroid ultrasound should be considered. XR NECK SOFT TISSUE   Final Result   Mild narrowing the glottis again noted      Otherwise, unremarkable soft tissues of the neck         XR CHEST PORTABLE   Final Result   No acute process. Stable cardiomegaly           XR NECK SOFT TISSUE    Result Date: 10/20/2021  EXAMINATION: TWO XRAY VIEWS OF THE NECK SOFT TISSUES 10/20/2021 10:35 am COMPARISON: 10/16/2021 HISTORY: ORDERING SYSTEM PROVIDED HISTORY: stridor TECHNOLOGIST PROVIDED HISTORY: Reason for exam:->stridor Reason for Exam: Shortness of Breath (in by EMS from home was d/c yesterday after being inpatient for 3 days, patient states it was related to sob from missing dialysis, patient reporting she has a severe panic disorder and when she cant breath she panics and it causes this response ) Acuity: Acute Type of Exam: Initial FINDINGS: Epiglottis again appears normal.  No foreign body. No significant soft tissue swelling. Mild narrowing of the glottis again noted. Mild narrowing the glottis again noted Otherwise, unremarkable soft tissues of the neck     XR NECK SOFT TISSUE    Result Date: 10/16/2021  EXAMINATION: TWO XRAY VIEWS OF THE NECK SOFT TISSUES 10/16/2021 10:32 pm COMPARISON: None.  HISTORY: ORDERING SYSTEM PROVIDED HISTORY: stridor TECHNOLOGIST PROVIDED HISTORY: Reason for exam:->stridor FINDINGS: The upper airway is patent. The retropharyngeal soft tissues and epiglottis are not thickened. There is narrowing at the glottis. No radiopaque foreign bodies are seen over the airway. Retropharyngeal soft tissues and epiglottis appear normal.  There is some narrowing at the glottis. CT SOFT TISSUE NECK W CONTRAST    Result Date: 10/20/2021  EXAMINATION: CT OF THE NECK SOFT TISSUE WITH CONTRAST  10/20/2021 TECHNIQUE: CT of the neck was performed with the administration of intravenous contrast. Multiplanar reformatted images are provided for review. Dose modulation, iterative reconstruction, and/or weight based adjustment of the mA/kV was utilized to reduce the radiation dose to as low as reasonably achievable. COMPARISON: CTA head and neck 08/27/2020 HISTORY: ORDERING SYSTEM PROVIDED HISTORY: epiglottitis TECHNOLOGIST PROVIDED HISTORY: Reason for exam:->epiglottitis Decision Support Exception - unselect if not a suspected or confirmed emergency medical condition->Emergency Medical Condition (MA) Reason for Exam: headache Acuity: Unknown Type of Exam: Unknown FINDINGS: PHARYNX/LARYNX: Nasopharynx and oropharynx appear normal.Parapharyngeal tissue planes are preserved. Oral cavity and floor of mouth appear normal within constraints of artifact from dental amalgam. spaces appear normal.The hypopharynx and infraglottic trachea are unremarkable. There is asymmetric thickening of the right aryepiglottic fold. There is a suspected vocal cord polyp protruding into the larynx (series 2, image 64 and series 602, image 77). Imaged upper esophagus is unremarkable. SALIVARY GLANDS/THYROID:The parotid and submandibular glands appear unremarkable. Again seen is a multinodular thyroid with a dominant 16 mm left thyroid lobe nodule. LYMPH NODES: No cervical or supraclavicular lymphadenopathy is seen. SOFT TISSUES: No appreciable soft tissue swelling. No mass within carotid spaces. Patent extracranial carotid systems and internal jugular veins bilaterally. BRAIN/ORBITS/SINUSES: No abnormal intracranial enhancement, mass effect, or hydrocephalus within the imaged brain. Orbital soft tissue planes are preserved. Imaged paranasal sinuses are clear. Mastoid air cells and middle ear cavities are clear. LUNG APICES/SUPERIOR MEDIASTINUM:Imaged lung apices are clear of focal consolidation or mass. Partially imaged right IJ central venous catheter. BONES:  No aggressive appearing lytic or blastic bony lesion. No significant spondylotic changes in the visualized spine. 1. Asymmetric thickening of the right aryepiglottic fold may reflect underlying edema and supraglottitis, however underlying mass not excluded. In addition, there is a suspected vocal cord polyp protruding into the larynx. Direct visual inspection is recommended. 2. No cervical lymphadenopathy. 3. Multinodular thyroid gland again noted with a dominant left 16 mm nodule. Nonemergent thyroid ultrasound should be considered. XR CHEST PORTABLE    Result Date: 10/20/2021  EXAMINATION: ONE XRAY VIEW OF THE CHEST 10/20/2021 10:35 am COMPARISON: 10/16/2021 HISTORY: ORDERING SYSTEM PROVIDED HISTORY: sob TECHNOLOGIST PROVIDED HISTORY: Reason for exam:->sob Reason for Exam: Shortness of Breath (in by EMS from home was d/c yesterday after being inpatient for 3 days, patient states it was related to sob from missing dialysis, patient reporting she has a severe panic disorder and when she cant breath she panics and it causes this response ) Acuity: Acute Type of Exam: Initial FINDINGS: Right-sided central venous catheter remains in place. The lungs are without acute focal process. There is no effusion or pneumothorax. The cardiomediastinal silhouette is stable. The osseous structures are stable. No acute process.  Stable cardiomegaly     XR CHEST PORTABLE    Result Date: 10/16/2021  EXAMINATION: ONE XRAY VIEW OF THE CHEST 10/16/2021 pericardial effusion. No enlarged or suspicious axillary, hilar, or mediastinal lymphadenopathy. The thyroid gland is mildly enlarged and somewhat heterogeneous. The right jugular vein hemodialysis catheter ends in the upper right atrium. Lungs/pleura: The trachea and mainstem bronchi are widely patent. There is interlobular septal thickening with diffuse bilateral lower lobe airspace disease and ground-glass opacity. No cavitary lesions. No discrete pulmonary nodule or mass. Moderate right and small left pleural effusions. No pericardial effusion. No pneumothorax. Soft Tissues/Bones: Degenerative changes are present throughout the thoracic spine. Upper Abdomen: The visualized upper abdomen is unremarkable. No evidence of pulmonary embolism or acute thoracic aortic abnormality. Cardiomegaly with moderate pulmonary vascular congestive change, moderate right, and small left pleural effusions. Minimal superimposed bibasilar atelectasis. No significant pericardial effusion.            PROCEDURES   Unless otherwise noted below, none     Procedures    CRITICAL CARE TIME   N/A    CONSULTS:  IP CONSULT TO OTOLARYNGOLOGY  IP CONSULT TO INTERNAL MEDICINE  IP CONSULT TO NEPHROLOGY  IP CONSULT TO NEPHROLOGY  IP CONSULT TO OTOLARYNGOLOGY      EMERGENCY DEPARTMENT COURSE and DIFFERENTIAL DIAGNOSIS/MDM:   Vitals:    Vitals:    10/20/21 1400 10/20/21 1415 10/20/21 1430 10/20/21 1450   BP: 129/87 136/83 (!) 137/90 138/72   Pulse: 82 82 82 83   Resp: 11 10 11 16   Temp:    97.2 °F (36.2 °C)   TempSrc:    Temporal   SpO2:   99%    Weight:           Patient was given the following medications:  Medications   LORazepam (ATIVAN) injection 0.5 mg (0.5 mg IntraVENous Given 10/20/21 1041)   iopamidol (ISOVUE-370) 76 % injection 75 mL (75 mLs IntraVENous Given 10/20/21 1203)   Dexamethasone Sodium Phosphate injection 10 mg (10 mg IntraVENous Given 10/20/21 1344)   clindamycin (CLEOCIN) 600 mg in dextrose 5 % 50 mL IVPB (0 mg IntraVENous Stopped 10/20/21 1442)           Patient is a 59-year-old female who presents to the ED with complaint of shortness of breath. Patient not hypoxic on room air. Patient appears to be suffering from some intermittent stridor. Concern for upper respiratory etiology upon patient's shortness of breath. Stridor is intermittent. Patient is complaining of anxiety given dose of Ativan here in the emergency department. IV established and blood work obtained. Lactic acid was normal.  CBC showed white count of 19.7 with hemoglobin 10.3 and normal platelets. CMP relatively unremarkable other than creatinine of 4.8 and BUN of 36 mostly secondary to patient's known history of end-stage renal disease on hemodialysis. Troponin 0.2. Troponin most likely secondary to patient's known history of end-stage renal disease on hemodialysis. BNP 19,000. Chest x-ray showed no significant evidence of pulmonary edema or fluid overload. Cardiomegaly incidentally noted. EKG interpreted by attending. X-ray of the soft tissue neck obtained given patient's stridor showed mild narrowing to the glottis. Was present on previous x-ray of the neck but given patient's continued stridor and now elevated white blood cell count will obtain CT of the soft tissue neck. CT of the neck showed asymmetric thickening of the right aryepiglottic fold most likely due to underlying edema or supraglottitis. Mass cannot be excluded. Vocal cord polyp also noted. Multinodular thyroid gland noted with a 16mm nodule. Case discussed with ENT, Dr. Rosie Young, recommended steroids, antibiotics and admission for potential scope. Case discussed with Dr. Maria Ines Leach who graciously agreed to accept patient for admission at this time. FINAL IMPRESSION      1. Stridor    2. Supraglottitis without airway obstruction          DISPOSITION/PLAN   DISPOSITION Admitted 10/20/2021 01:28:31 PM      PATIENT REFERRED TO:  No follow-up provider specified.     DISCHARGE MEDICATIONS:  New Prescriptions    No medications on file       DISCONTINUED MEDICATIONS:  Discontinued Medications    No medications on file              (Please note that portions of this note were completed with a voice recognition program.  Efforts were made to edit the dictations but occasionally words are mis-transcribed.)    DEE Elaine Ma (electronically signed)          DEE Grossman  10/20/21 [id number]

## 2021-10-21 VITALS
OXYGEN SATURATION: 99 % | BODY MASS INDEX: 27.54 KG/M2 | RESPIRATION RATE: 18 BRPM | SYSTOLIC BLOOD PRESSURE: 142 MMHG | HEART RATE: 85 BPM | WEIGHT: 175.49 LBS | HEIGHT: 67 IN | DIASTOLIC BLOOD PRESSURE: 77 MMHG | TEMPERATURE: 98.8 F

## 2021-10-21 LAB
ESTIMATED AVERAGE GLUCOSE: 105.4 MG/DL
GLUCOSE BLD-MCNC: 216 MG/DL (ref 70–99)
GLUCOSE BLD-MCNC: 252 MG/DL (ref 70–99)
GLUCOSE BLD-MCNC: 279 MG/DL (ref 70–99)
HBA1C MFR BLD: 5.3 %
PERFORMED ON: ABNORMAL

## 2021-10-21 PROCEDURE — 6360000002 HC RX W HCPCS: Performed by: INTERNAL MEDICINE

## 2021-10-21 PROCEDURE — 94640 AIRWAY INHALATION TREATMENT: CPT

## 2021-10-21 PROCEDURE — 2580000003 HC RX 258: Performed by: INTERNAL MEDICINE

## 2021-10-21 PROCEDURE — 2500000003 HC RX 250 WO HCPCS: Performed by: INTERNAL MEDICINE

## 2021-10-21 PROCEDURE — 94761 N-INVAS EAR/PLS OXIMETRY MLT: CPT

## 2021-10-21 PROCEDURE — 6370000000 HC RX 637 (ALT 250 FOR IP): Performed by: INTERNAL MEDICINE

## 2021-10-21 PROCEDURE — 92526 ORAL FUNCTION THERAPY: CPT

## 2021-10-21 PROCEDURE — 92610 EVALUATE SWALLOWING FUNCTION: CPT

## 2021-10-21 RX ORDER — CLINDAMYCIN HYDROCHLORIDE 300 MG/1
300 CAPSULE ORAL 4 TIMES DAILY
Qty: 40 CAPSULE | Refills: 0 | Status: SHIPPED | OUTPATIENT
Start: 2021-10-21 | End: 2021-10-31

## 2021-10-21 RX ORDER — ALBUTEROL SULFATE 90 UG/1
2 AEROSOL, METERED RESPIRATORY (INHALATION) EVERY 4 HOURS PRN
Qty: 18 G | Refills: 0 | Status: ON HOLD | OUTPATIENT
Start: 2021-10-21 | End: 2022-01-21 | Stop reason: HOSPADM

## 2021-10-21 RX ADMIN — LISINOPRIL 40 MG: 20 TABLET ORAL at 08:31

## 2021-10-21 RX ADMIN — INSULIN LISPRO 6 UNITS: 100 INJECTION, SOLUTION INTRAVENOUS; SUBCUTANEOUS at 13:02

## 2021-10-21 RX ADMIN — SPIRONOLACTONE 50 MG: 25 TABLET ORAL at 08:31

## 2021-10-21 RX ADMIN — HEPARIN SODIUM 5000 UNITS: 5000 INJECTION INTRAVENOUS; SUBCUTANEOUS at 06:36

## 2021-10-21 RX ADMIN — INSULIN GLARGINE 20 UNITS: 100 INJECTION, SOLUTION SUBCUTANEOUS at 10:05

## 2021-10-21 RX ADMIN — ASPIRIN 81 MG: 81 TABLET, COATED ORAL at 08:31

## 2021-10-21 RX ADMIN — INSULIN LISPRO 4 UNITS: 100 INJECTION, SOLUTION INTRAVENOUS; SUBCUTANEOUS at 08:31

## 2021-10-21 RX ADMIN — DEXAMETHASONE SODIUM PHOSPHATE 4 MG: 4 INJECTION, SOLUTION INTRAMUSCULAR; INTRAVENOUS at 06:36

## 2021-10-21 RX ADMIN — TIOTROPIUM BROMIDE AND OLODATEROL 2 PUFF: 3.124; 2.736 SPRAY, METERED RESPIRATORY (INHALATION) at 08:38

## 2021-10-21 RX ADMIN — AMLODIPINE BESYLATE 10 MG: 5 TABLET ORAL at 08:31

## 2021-10-21 RX ADMIN — LORAZEPAM 0.5 MG: 0.5 TABLET ORAL at 06:36

## 2021-10-21 RX ADMIN — FUROSEMIDE 80 MG: 40 TABLET ORAL at 08:31

## 2021-10-21 RX ADMIN — Medication 10 ML: at 08:38

## 2021-10-21 RX ADMIN — CLINDAMYCIN PHOSPHATE 600 MG: 600 INJECTION, SOLUTION INTRAVENOUS at 06:35

## 2021-10-21 RX ADMIN — FLUOXETINE 20 MG: 20 CAPSULE ORAL at 08:31

## 2021-10-21 RX ADMIN — PROPRANOLOL HYDROCHLORIDE 80 MG: 80 CAPSULE, EXTENDED RELEASE ORAL at 10:05

## 2021-10-21 ASSESSMENT — PAIN SCALES - GENERAL
PAINLEVEL_OUTOF10: 0
PAINLEVEL_OUTOF10: 0

## 2021-10-21 NOTE — PLAN OF CARE
Problem: Falls - Risk of:  Goal: Will remain free from falls  Description: Will remain free from falls  Outcome: Ongoing  Goal: Absence of physical injury  Description: Absence of physical injury  Outcome: Ongoing     Problem: SAFETY  Goal: Free from accidental physical injury  10/21/2021 1314 by Toro Robles RN  Outcome: Ongoing  10/21/2021 0616 by Tyshawn Sams RN  Outcome: Ongoing  Goal: Free from intentional harm  10/21/2021 1314 by Toro Robles RN  Outcome: Ongoing  10/21/2021 0616 by Tyshawn Sams RN  Outcome: Ongoing     Problem: DAILY CARE  Goal: Daily care needs are met  10/21/2021 1314 by Toro Robles RN  Outcome: Ongoing  10/21/2021 0616 by Tyshawn Sams RN  Outcome: Ongoing     Problem: PAIN  Goal: Patient's pain/discomfort is manageable  Outcome: Ongoing     Problem: SKIN INTEGRITY  Goal: Skin integrity is maintained or improved  Outcome: Ongoing     Problem: KNOWLEDGE DEFICIT  Goal: Patient/S.O. demonstrates understanding of disease process, treatment plan, medications, and discharge instructions.   10/21/2021 1314 by Toro Robles RN  Outcome: Ongoing  10/21/2021 0616 by Tyshawn Sams RN  Outcome: Ongoing     Problem: DISCHARGE BARRIERS  Goal: Patient's continuum of care needs are met  10/21/2021 1314 by Toro Robles RN  Outcome: Ongoing  10/21/2021 0616 by Tyshawn Sams RN  Outcome: Ongoing
Problem: SAFETY  Goal: Free from accidental physical injury  Outcome: Ongoing  Goal: Free from intentional harm  Outcome: Ongoing     Problem: DAILY CARE  Goal: Daily care needs are met  Outcome: Ongoing     Problem: KNOWLEDGE DEFICIT  Goal: Patient/S.O. demonstrates understanding of disease process, treatment plan, medications, and discharge instructions. Outcome: Ongoing     Problem: DISCHARGE BARRIERS  Goal: Patient's continuum of care needs are met  Outcome: Ongoing      Pt room well lit with safety measures in place. Skin intact. Call light remains within reach. No falls/injury this shift. Pt airway remains patent. Ativan prn for increased anxiety to improve resps.  VSS
07-Apr-2019

## 2021-10-21 NOTE — PROGRESS NOTES
CLINICAL PHARMACY NOTE: MEDS TO BEDS    Total # of Prescriptions Filled: 1   The following medications were delivered to the patient:  · Albuterol HFA    Additional Documentation:    Patient picked up from Outpatient Pharmacy=signed  Mel De Leon CPhT

## 2021-10-21 NOTE — PROGRESS NOTES
;         Speech Language Pathology  Facility/Department: [address]  Initial Assessment  DYSPHAGIA BEDSIDE SWALLOW EVALUATION     Patient: [place]   : 1975   MRN: [id number]      Evaluation Date: 10/21/2021   Admitting Diagnosis: Stridor [R06.1]  Epiglottitis [J05.10]  Supraglottitis without airway obstruction [J04.30]  Pain: Pt denies pain at this time                         H&P:Kristine Haddad is a 55 y.o. female with past medical history of CHF, CVA, diabetes, hypertension, end-stage renal disease on hemodialysis who presents the ED with complaint of shortness of breath. Patient arrived by EMS from home. Was just recently discharged from the hospital yesterday. Patient states she was admitted for several days secondary to shortness of breath they thought was due to missing dialysis. Patient states when she got home she felt like she could not breathe and like she had some swelling to her throat. Patient states she started having some anxiety and panic. Patient states she does have history of panic attacks and states when she panic she has difficulty breathing. Patient states now she feels like she cannot get air through her throat. Denies any chest pain, abdominal pain, leg swelling, nausea/vomiting, orthopnea, pedal edema, calf tenderness, fever/chills, cough or hemoptysis. Denies any rashes or lesions. Denies any drooling, trismus or changes in phonation. Per chart review, ENT was consulted and completed a Flexible Fiberoptic Nasopharyngolaryngoscopy on 10/20/21. However, results are not currently available for review. Chest xray:   Impression   No acute process.       Stable cardiomegaly     X-ray Neck Soft Tissue:  Impression   Mild narrowing the glottis again noted         CT SOFT TISSUE OF THE NECK WITH CONTRAST:  Impression   1.  Asymmetric thickening of the right aryepiglottic fold may reflect   underlying edema and supraglottitis, however underlying mass not aspiration noted. However, intermittent changes in vocal quality are noted following thin liquids in isolation and in combination with other textures. It is currently unclear if vocal changes are consistent with aspiration risk or due to changes in vocal fold function. Therefore, will await results of ENT assessment with MBS requested as indicated. Recommended Diet and Intervention 10/21/2021:  Diet Solids Recommendation:  Regular diet Liquid Consistency Recommendation: Thin liquids/No straws Recommended form of Meds:   Meds whole with applesauce     Compensatory Swallowing Strategies:  Upright as possible with all PO intake , No straws , Swallow 2 times per bite     SHORT TERM DYSPHAGIA GOALS/PLAN OF CARE: Speech therapy for dysphagia tx 3-5 times per week during acute care stay. 1. Pt will functionally tolerate recommended diet with no overt clinical s/s of aspiration  2. If clinical s/s of aspiration/penetration continue to be noted, Pt will participate in Modified Barium Swallow Study       Dysphagia Therapeutic Intervention:  Diet Tolerance Monitoring , Patient/Family Education     Discharge Recommendations: Further speech/dysphagia treatment follow-up is pending ENT results with further treatment as indicated; Outpatient Speech Therapy for Dysphagia and Voice treatment as indicated following ENT results.      Patient Positioning: Upright in bed    Current Diet Level (prior to evaluation): NPO pending SLP evaluation    Respiratory Status:   [x]Room Air   []O2 via nasal cannula   []Other:    Dentition:  [x]Adequate  []Dentures   []Missing Many Teeth  []Edentulous  []Other:    Baseline Vocal Quality:  []Normal  [x]Dysphonic   []Aphonic   [x]Hoarse  []Wet  []Weak  []Other:    Volitional Cough:  []Strong  [x]Weak  []Wet  []Absent  []Congested  []Other:    Volitional Swallow:   []Absent   [x]Delayed     []Adequate     []Required use of drink     Oral Mechanism Exam:  []WFL [x]Mild   [] Moderate  []Severe  []To be assessed  Impaired:   []Left side      []Right side    [x]Labial ROM/Coordination    []Labial Symmetry   [x]Lingual ROM/Coordination   [x]Lingual Symmetry  []Gag  []Other:     Oral Phase: []WFL [x]Mild   [] Moderate  []Severe  []To be assessed   [x]Impaired/Prolonged Mastication:   []Spillage Left:   []Spillage Right:  []Pocketing Left:   []Pocketing Right:   []Decreased Anterior to Posterior Transit:   [x]Suspected Premature Bolus Loss:   []Lingual/Palatal Residue:   []Other:     Pharyngeal Phase: []WFL [x]Mild   [x] Moderate  []Severe  []To be assessed   []Delayed Swallow:   []Suspected Pharyngeal Pooling:   [x]Decreased Laryngeal Elevation:   []Absent Swallow:  [x]Wet Vocal Quality:   []Throat Clearing-Immediate:   []Throat Clearing-Delayed:   []Cough-Immediate:   []Cough-Delayed:  []Change in Vital Signs:  [x]Suspected Delayed Pharyngeal Clearing:  []Other:       Eating Assistance:  [x]Independent  []Setup or clean-up assistance   [] Supervision or touching assistance   [] Partial or moderate assistance   [] Substantial or maximal assistance  [] Dependent       EDUCATION:   Provided education regarding role of SLP, results of assessment, recommendations and general speech pathology plan of care. [x] Pt verbalized understanding and agreement   [] Pt requires ongoing learning   [] No evidence of comprehension     If patient discharges prior to next visit, this note will serve as discharge.      Timed Code Minutes:0 min   Total Treatment Minutes: 40 min    Danna Pedroza [id number]

## 2021-10-21 NOTE — CONSULTS
Renal Consult Dictated: [id number]    ESRD-next HD tomorrow. EDW decreased from 90 to 83kg. Around EDW or below today. Euvolemic. Stop IVF. BP controlled  Vocal Cord Paralysis    Thank you.

## 2021-10-21 NOTE — PROGRESS NOTES
Perfect serve message @ [id number] as follows; Pt requesting Mucinex d/t increased mucus production. Waiting for response at this time.

## 2021-10-21 NOTE — DISCHARGE SUMMARY
[facility] -- Physician Discharge Summary     Criselda Diop  1975  MRN: [id number]    Admit Date: 10/20/2021  Discharge Date: No discharge date for patient encounter. Attending MD: Kimberly Hill MD  Discharging MD: Kimberly Hill MD  PCP: Karen RUIZ. [address] [facility] / [address] [place] [address] [phone]    Admission Diagnosis: Stridor [R06.1]  Epiglottitis [J05.10]  Supraglottitis without airway obstruction [J04.30]  DISCHARGE DIAGNOSIS: paralyzed vocal cord    Full Hospital Problem List:  Active Hospital Problems    Diagnosis Date Noted    Epiglottitis [J05.10] 10/20/2021    ESRD (end stage renal disease) ([facility] [address] [N18.6] 10/04/2021    HTN (hypertension), benign [I10]     Type 2 diabetes mellitus, with long-term current use of insulin ([facility] [address] [E11.9, Z79.4]            Hospital Course:  Kalina Ramirez is a 55 y.o. female with past medical history of CHF, CVA, diabetes, hypertension, end-stage renal disease on hemodialysis who presents the ED with complaint of shortness of breath.  Patient arrived by EMS from home.  Was just recently discharged from the hospital yesterday. [place]iste states she was admitted for several days secondary to shortness of breath they thought was due to missing dialysis.  Patient states when she got home she felt like she could not breathe and like she had some swelling to her throat.  Patient states she started having some anxiety and panic.  Patient states she does have history of panic attacks and states when she panic she has difficulty breathing. Review of old chart does show that pt has hx of panic attacks that leads to dyspnea     Prev admit also shows some poss stridor; this did get better with some racemic epi. Was seen by pulm at that time and they did not recommend further workup     CT Neck from ER shows       Impression:         1.  Asymmetric thickening of the right aryepiglottic fold may reflect   underlying edema and supraglottitis, however underlying mass not excluded. In addition, there is a suspected vocal cord polyp protruding into the   larynx.  Direct visual inspection is recommended. 2. No cervical lymphadenopathy.          Given this finding, ENT has been asked to see pt  Dr Manuel Macedo recommends admission  Iv abx and iv steroids to be given  Plan for scope       Scope done - per d/w dr Paola Mora - swelling of R AE fold, and paresis/paralysis of R vocal cord. Airway patent. No epiglottitis    Recommended to resume diet, abx x 10d, outpt f/u with ENT and speech tx. Cleared for d/c from ENT standpoint if able to tolerate diet    Consults made during Hospitalization:  IP CONSULT TO OTOLARYNGOLOGY  IP CONSULT TO INTERNAL MEDICINE  IP CONSULT TO NEPHROLOGY  IP CONSULT TO NEPHROLOGY  IP CONSULT TO OTOLARYNGOLOGY    Treatment team at time of Discharge: Treatment Team: Attending Provider: Kathy South MD; Consulting Physician: Gerda Santiago MD; Consulting Physician: Kathy South MD; Consulting Physician: Fenton Aase, MD; Respiratory Therapist (Day): Panda Dunbar RCP; Registered Nurse: Britany Jack RN    Imaging Results:  XR NECK SOFT TISSUE    Result Date: 10/20/2021  EXAMINATION: TWO XRAY VIEWS OF THE NECK SOFT TISSUES 10/20/2021 10:35 am COMPARISON: 10/16/2021 HISTORY: ORDERING SYSTEM PROVIDED HISTORY: stridor TECHNOLOGIST PROVIDED HISTORY: Reason for exam:->stridor Reason for Exam: Shortness of Breath (in by EMS from home was d/c yesterday after being inpatient for 3 days, patient states it was related to sob from missing dialysis, patient reporting she has a severe panic disorder and when she cant breath she panics and it causes this response ) Acuity: Acute Type of Exam: Initial FINDINGS: Epiglottis again appears normal.  No foreign body. No significant soft tissue swelling. Mild narrowing of the glottis again noted.      Mild narrowing the glottis again noted Otherwise, unremarkable soft tissues of the neck     XR NECK SOFT TISSUE    Result Date: 10/16/2021  EXAMINATION: TWO XRAY VIEWS OF THE NECK SOFT TISSUES 10/16/2021 10:32 pm COMPARISON: None. HISTORY: ORDERING SYSTEM PROVIDED HISTORY: stridor TECHNOLOGIST PROVIDED HISTORY: Reason for exam:->stridor FINDINGS: The upper airway is patent. The retropharyngeal soft tissues and epiglottis are not thickened. There is narrowing at the glottis. No radiopaque foreign bodies are seen over the airway. Retropharyngeal soft tissues and epiglottis appear normal.  There is some narrowing at the glottis. CT SOFT TISSUE NECK W CONTRAST    Result Date: 10/20/2021  EXAMINATION: CT OF THE NECK SOFT TISSUE WITH CONTRAST  10/20/2021 TECHNIQUE: CT of the neck was performed with the administration of intravenous contrast. Multiplanar reformatted images are provided for review. Dose modulation, iterative reconstruction, and/or weight based adjustment of the mA/kV was utilized to reduce the radiation dose to as low as reasonably achievable. COMPARISON: CTA head and neck 08/27/2020 HISTORY: ORDERING SYSTEM PROVIDED HISTORY: epiglottitis TECHNOLOGIST PROVIDED HISTORY: Reason for exam:->epiglottitis Decision Support Exception - unselect if not a suspected or confirmed emergency medical condition->Emergency Medical Condition (MA) Reason for Exam: headache Acuity: Unknown Type of Exam: Unknown FINDINGS: PHARYNX/LARYNX: Nasopharynx and oropharynx appear normal.Parapharyngeal tissue planes are preserved. Oral cavity and floor of mouth appear normal within constraints of artifact from dental amalgam. spaces appear normal.The hypopharynx and infraglottic trachea are unremarkable. There is asymmetric thickening of the right aryepiglottic fold. There is a suspected vocal cord polyp protruding into the larynx (series 2, image 64 and series 602, image 77). Imaged upper esophagus is unremarkable.  SALIVARY GLANDS/THYROID:The parotid and submandibular glands appear unremarkable. Again seen is a multinodular thyroid with a dominant 16 mm left thyroid lobe nodule. LYMPH NODES: No cervical or supraclavicular lymphadenopathy is seen. SOFT TISSUES: No appreciable soft tissue swelling. No mass within carotid spaces. Patent extracranial carotid systems and internal jugular veins bilaterally. BRAIN/ORBITS/SINUSES: No abnormal intracranial enhancement, mass effect, or hydrocephalus within the imaged brain. Orbital soft tissue planes are preserved. Imaged paranasal sinuses are clear. Mastoid air cells and middle ear cavities are clear. LUNG APICES/SUPERIOR MEDIASTINUM:Imaged lung apices are clear of focal consolidation or mass. Partially imaged right IJ central venous catheter. BONES:  No aggressive appearing lytic or blastic bony lesion. No significant spondylotic changes in the visualized spine. 1. Asymmetric thickening of the right aryepiglottic fold may reflect underlying edema and supraglottitis, however underlying mass not excluded. In addition, there is a suspected vocal cord polyp protruding into the larynx. Direct visual inspection is recommended. 2. No cervical lymphadenopathy. 3. Multinodular thyroid gland again noted with a dominant left 16 mm nodule. Nonemergent thyroid ultrasound should be considered. XR CHEST PORTABLE    Result Date: 10/20/2021  EXAMINATION: ONE XRAY VIEW OF THE CHEST 10/20/2021 10:35 am COMPARISON: 10/16/2021 HISTORY: ORDERING SYSTEM PROVIDED HISTORY: sob TECHNOLOGIST PROVIDED HISTORY: Reason for exam:->sob Reason for Exam: Shortness of Breath (in by EMS from home was d/c yesterday after being inpatient for 3 days, patient states it was related to sob from missing dialysis, patient reporting she has a severe panic disorder and when she cant breath she panics and it causes this response ) Acuity: Acute Type of Exam: Initial FINDINGS: Right-sided central venous catheter remains in place. The lungs are without acute focal process.   There is no effusion COMPARISON: None. HISTORY: ORDERING SYSTEM PROVIDED HISTORY: pe- dialysis patient - okay to contrast TECHNOLOGIST PROVIDED HISTORY: Reason for exam:->pe- dialysis patient - okay to contrast Decision Support Exception - unselect if not a suspected or confirmed emergency medical condition->Emergency Medical Condition (MA) Reason for Exam: Shortness of Breath (SOB with audbile wheezes for over one week. Denies pulmonary hx or exposure to recent illness; quit smoking 8 weeks ago & has anxiety. Reports was in the ICU for unknown reason & was placed on vent in August 2021; seen on 10/4 & needed blood transfusion.  ) FINDINGS: Pulmonary Arteries: Pulmonary arteries are adequately opacified for evaluation. No evidence of intraluminal filling defect to suggest pulmonary embolism. Main pulmonary artery is normal in caliber. Mediastinum: The heart is enlarged. The thoracic aorta and proximal great vessels are patent and of normal caliber. No pericardial effusion. No enlarged or suspicious axillary, hilar, or mediastinal lymphadenopathy. The thyroid gland is mildly enlarged and somewhat heterogeneous. The right jugular vein hemodialysis catheter ends in the upper right atrium. Lungs/pleura: The trachea and mainstem bronchi are widely patent. There is interlobular septal thickening with diffuse bilateral lower lobe airspace disease and ground-glass opacity. No cavitary lesions. No discrete pulmonary nodule or mass. Moderate right and small left pleural effusions. No pericardial effusion. No pneumothorax. Soft Tissues/Bones: Degenerative changes are present throughout the thoracic spine. Upper Abdomen: The visualized upper abdomen is unremarkable. No evidence of pulmonary embolism or acute thoracic aortic abnormality. Cardiomegaly with moderate pulmonary vascular congestive change, moderate right, and small left pleural effusions. Minimal superimposed bibasilar atelectasis. No significant pericardial effusion. Discharge Exam:  See progress note from day of d/c    Disposition: home    Condition: stable    Discharge Medications:   [employer]   Home Medication Instructions WOT:[id number]    Printed on:10/21/21 1007   Medication Information                      amLODIPine (NORVASC) 10 MG tablet  Take 1 tablet by mouth daily             aspirin 81 MG EC tablet  Take 1 tablet by mouth daily             atorvastatin (LIPITOR) 40 MG tablet  Take 1 tablet by mouth nightly             clindamycin (CLEOCIN) 300 MG capsule  Take 1 capsule by mouth 4 times daily for 10 days             clonazePAM (KLONOPIN) 0.5 MG tablet  Take 0.5 mg by mouth 2 times daily as needed. cloNIDine (CATAPRES) 0.2 MG/24HR PTWK  Place 1 patch onto the skin once a week             Dulaglutide 0.75 MG/0.5ML SOPN  Inject 0.75 mg into the skin once a week             FLUoxetine (PROZAC) 20 MG capsule  Take 1 capsule by mouth daily             furosemide (LASIX) 80 MG tablet  Take 40 mg by mouth daily              glucose (GLUTOSE) 40 % GEL  Take 37.5 mLs by mouth as needed (low blood sugar)             glycopyrrolate-formoterol (BEVESPI AEROSPHERE) 9-4.8 MCG/ACT AERO  Inhale 2 puffs into the lungs 2 times daily             insulin glargine (LANTUS SOLOSTAR) 100 UNIT/ML injection pen  Inject 20 Units into the skin every morning              insulin lispro, 1 Unit Dial, 100 UNIT/ML SOPN  Inject 0-6 Units into the skin 3 times daily (with meals) **Corrective Low Dose Algorithm**  Glucose: Dose:               No Insulin  140-199 1 Unit  200-249 2 Units  250-299 3 Units  300-349 4 Units  350-399 5 Units  Over 399 6 Units             Insulin Pen Needle 29G X 12.7MM MISC  1 each by Does not apply route daily             lisinopril (PRINIVIL;ZESTRIL) 40 MG tablet  Take 40 mg by mouth daily             LORazepam (ATIVAN) 0.5 MG tablet  Take 0.5 mg by mouth every 8 hours as needed for Anxiety.              pregabalin (LYRICA) 75 MG capsule  Take 1 capsule by mouth nightly for 7 days. propranolol (INDERAL LA) 80 MG extended release capsule  Take 1 capsule by mouth every morning             spironolactone (ALDACTONE) 50 MG tablet  Take 1 tablet by mouth daily                 Allergies: Allergies   Allergen Reactions    Amoxicillin Itching and Hives     Tolerates cephalosporins  Patient tolerating cefazolin (ANCEF) as of October 11, 2018  Patient tolerating cefazolin (ANCEF) as of October 11, 2018  Tolerates cephalosporins  Patient tolerating cefazolin (ANCEF) as of October 11, 2018    Levofloxacin Anaphylaxis    Vancomycin Shortness Of Breath, Hives and Anaphylaxis    Tape [Adhesive Tape] Other (See Comments)     Paper tape turns skin bright red. Plastic tape okay. Follow up Instructions: Follow-up with PCP: Viet Oneill in 2 wk  To see ENT 1 wk .       Total time spent on day of discharge including face-to-face visit, examination, documentation, counseling, preparation of discharge plans and followup, and discharge medicine reconciliation and presciptions is 35 minutes    Signed:  Artelia Halsted, MD  10/21/2021
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
## 2021-10-21 NOTE — PROGRESS NOTES
Progress Note - Dr. Celestine Babinski - Internal Medicine  PCP: Javi Desir [address] [facility] 765 [facility]    Hospital Day: 1  Code Status: Full Code  Current Diet: ADULT DIET; Regular; No Drinking Straws        CC: follow up on medical issues    Subjective:   Boaz Hoyos is a 55 y.o. female. She denies problems    Pt apparently scoped overnight  No procedure note in chart  Per verbal report, I understand thre are paralyzed vocal cord, but not anything else  Case d/w speech - we need more info before determining whether pt is safe to eat or not  Have paged Dr Keena Spann to see if he is able to put more details in the chart    Pt has no complaints  She wants to eat  No stridor this am    She denies chest pain, denies shortness of breath, denies nausea,  denies emesis. 10 system Review of Systems is reviewed with patient, and pertinent positives are noted in HPI above . Otherwise, Review of systems is negative. I have reviewed the patient's medical and social history in detail and updated the computerized patient record. To recap: She  has a past medical history of Blind in both eyes, Cerebral artery occlusion with cerebral infarction ([address] CHF (congestive heart failure) ([address] Chronic kidney disease, Depression, Diabetes mellitus out of control ([address] Diabetes mellitus, type II ([address] Diabetic neuropathy associated with type 2 diabetes mellitus ([address] Generalized headaches, Hypertension, Infertility, Insomnia, Migraine headache, Mixed hyperlipidemia, Otitis media, Pelvic abscess in female, Pneumonia, Stroke [facility]), and Stroke ([facility] [address] . She  has a past surgical history that includes Cervix surgery; eye surgery; Foot surgery (Right); Foot surgery (Bilateral); Foot surgery (Left); and IR TUNNELED CVC PLACE WO SQ PORT/PUMP > 5 YEARS (9/7/2021). . She  reports that she has quit smoking. Her smoking use included cigarettes. She smoked 1.00 pack per day.  She has never used smokeless tobacco. She reports that she does not drink alcohol and does not use drugs. .        Active Hospital Problems    Diagnosis Date Noted    Epiglottitis [J05.10] 10/20/2021    ESRD (end stage renal disease) ([facility] [N18.6] 10/04/2021    HTN (hypertension), benign [I10]     Type 2 diabetes mellitus, with long-term current use of insulin (HCC) [E11.9, Z79.4]        Current Facility-Administered Medications: aspirin EC tablet 81 mg, 81 mg, Oral, Daily  pregabalin (LYRICA) capsule 75 mg, 75 mg, Oral, Nightly  amLODIPine (NORVASC) tablet 10 mg, 10 mg, Oral, Daily  spironolactone (ALDACTONE) tablet 50 mg, 50 mg, Oral, Daily  atorvastatin (LIPITOR) tablet 40 mg, 40 mg, Oral, Nightly  propranolol (INDERAL LA) extended release capsule 80 mg, 80 mg, Oral, QAM  [START ON 10/24/2021] cloNIDine (CATAPRES) 0.2 MG/24HR 1 patch, 1 patch, TransDERmal, Weekly  glucose (GLUTOSE) 40 % oral gel 15 g, 15 g, Oral, PRN  FLUoxetine (PROZAC) capsule 20 mg, 20 mg, Oral, Daily  clonazePAM (KLONOPIN) tablet 0.5 mg, 0.5 mg, Oral, BID PRN  tiotropium-olodaterol (STIOLTO) 2.5-2.5 MCG/ACT inhaler 2 puff, 2 puff, Inhalation, Daily  furosemide (LASIX) tablet 80 mg, 80 mg, Oral, Daily  insulin glargine (LANTUS;BASAGLAR) injection pen 20 Units, 20 Units, SubCUTAneous, QAM  lisinopril (PRINIVIL;ZESTRIL) tablet 40 mg, 40 mg, Oral, Daily  LORazepam (ATIVAN) tablet 0.5 mg, 0.5 mg, Oral, Q8H PRN  0.9 % sodium chloride infusion, , IntraVENous, Continuous  sodium chloride flush 0.9 % injection 5-40 mL, 5-40 mL, IntraVENous, 2 times per day  sodium chloride flush 0.9 % injection 5-40 mL, 5-40 mL, IntraVENous, PRN  0.9 % sodium chloride infusion, 25 mL, IntraVENous, PRN  ondansetron (ZOFRAN-ODT) disintegrating tablet 4 mg, 4 mg, Oral, Q8H PRN **OR** ondansetron (ZOFRAN) injection 4 mg, 4 mg, IntraVENous, Q6H PRN  magnesium hydroxide (MILK OF MAGNESIA) 400 MG/5ML suspension 30 mL, 30 mL, Oral, Daily PRN  acetaminophen (TYLENOL) tablet 650 mg, 650 mg, Oral, Q6H PRN **OR** acetaminophen (TYLENOL) suppository 650 mg, 650 mg, Rectal, Q6H PRN  hydrALAZINE (APRESOLINE) injection 10 mg, 10 mg, IntraVENous, Q6H PRN  0.9 % sodium chloride bolus, 500 mL, IntraVENous, PRN  insulin lispro (1 Unit Dial) 0-12 Units, 0-12 Units, SubCUTAneous, TID WC  insulin lispro (1 Unit Dial) 0-6 Units, 0-6 Units, SubCUTAneous, Nightly  glucose (GLUTOSE) 40 % oral gel 15 g, 15 g, Oral, PRN  dextrose 50 % IV solution, 12.5 g, IntraVENous, PRN  glucagon (rDNA) injection 1 mg, 1 mg, IntraMUSCular, PRN  dextrose 5 % solution, 100 mL/hr, IntraVENous, PRN  dexamethasone (DECADRON) injection 4 mg, 4 mg, IntraVENous, Q12H  clindamycin (CLEOCIN) 600 mg in dextrose 5 % 50 mL IVPB, 600 mg, IntraVENous, Q8H  heparin (porcine) injection 3,600 Units, 3,600 Units, IntraCATHeter, PRN  heparin (porcine) injection 5,000 Units, 5,000 Units, SubCUTAneous, 3 times per day         Objective:  BP (!) 150/78   Pulse 87   Temp 98.8 °F (37.1 °C) (Oral)   Resp 18   Ht 5' 7\" (1.702 m)   Wt 175 lb 7.8 oz (79.6 kg)   LMP 10/14/2021   SpO2 95%   BMI 27.49 kg/m²      Patient Vitals for the past 24 hrs:   BP Temp Temp src Pulse Resp SpO2 Height Weight   10/21/21 0839 -- -- -- -- 18 95 % -- --   10/21/21 0815 (!) 150/78 98.8 °F (37.1 °C) Oral 87 18 97 % -- --   10/21/21 0627 (!) 154/78 99.2 °F (37.3 °C) Oral 89 18 95 % -- 175 lb 7.8 oz (79.6 kg)   10/21/21 0307 -- -- -- -- -- -- 5' 7\" (1.702 m) 175 lb (79.4 kg)   10/21/21 0045 (!) 145/76 98.6 °F (37 °C) Oral 87 18 95 % -- --   10/20/21 2138 130/76 -- -- 92 -- -- -- --   10/20/21 2135 133/73 -- -- -- -- -- -- --   10/20/21 1942 (!) 98/56 98.3 °F (36.8 °C) Oral 93 16 98 % -- --   10/20/21 1800 121/83 97.1 °F (36.2 °C) Temporal 73 16 98 % -- --   10/20/21 1753 (!) 148/79 97 °F (36.1 °C) -- 81 16 -- -- --   10/20/21 1450 138/72 97.2 °F (36.2 °C) Temporal 83 16 -- -- --   10/20/21 1430 (!) 137/90 -- -- 82 11 99 % -- --   10/20/21 1415 136/83 -- -- 82 10 -- -- --   10/20/21 1400 129/87 -- -- 82 11 -- -- --   10/20/21 1345 125/80 -- -- 85 10 99 % -- --   10/20/21 1330 128/69 -- -- 83 10 98 % -- --   10/20/21 1315 134/71 -- -- 83 13 97 % -- --   10/20/21 1300 126/72 -- -- 84 11 96 % -- --   10/20/21 1100 130/70 -- -- 92 13 -- -- --   10/20/21 1045 136/80 -- -- 94 14 100 % -- --   10/20/21 1018 (!) 167/94 98.5 °F (36.9 °C) -- 62 19 96 % -- 185 lb (83.9 kg)     Patient Vitals for the past 96 hrs (Last 3 readings):   Weight   10/21/21 0627 175 lb 7.8 oz (79.6 kg)   10/21/21 0307 175 lb (79.4 kg)   10/20/21 1018 185 lb (83.9 kg)           Intake/Output Summary (Last 24 hours) at 10/21/2021 0945  Last data filed at 10/21/2021 0300  Gross per 24 hour   Intake 534.67 ml   Output --   Net 534.67 ml         Physical Exam:   BP (!) 150/78   Pulse 87   Temp 98.8 °F (37.1 °C) (Oral)   Resp 18   Ht 5' 7\" (1.702 m)   Wt 175 lb 7.8 oz (79.6 kg)   LMP 10/14/2021   SpO2 95%   BMI 27.49 kg/m²   General appearance: alert, appears stated age and cooperative  Head: Normocephalic, without obvious abnormality, atraumatic  Lungs: clear to auscultation bilaterally  Heart: regular rate and rhythm, S1, S2 normal, no murmur, click, rub or gallop  Abdomen: soft, non-tender; bowel sounds normal; no masses,  no organomegaly  Extremities: extremities normal, atraumatic, no cyanosis or edema    Labs:  Lab Results   Component Value Date    WBC 19.7 (H) 10/20/2021    HGB 10.3 (L) 10/20/2021    HCT 31.4 (L) 10/20/2021     10/20/2021    CHOL 164 02/24/2021    TRIG 319 (H) 08/31/2021    HDL 41 02/24/2021    LDLDIRECT 100 (H) 04/18/2011    ALT 9 (L) 10/20/2021    AST 15 10/20/2021     10/20/2021    K 4.5 10/20/2021    CL 98 (L) 10/20/2021    CREATININE 4.8 (H) 10/20/2021    BUN 36 (H) 10/20/2021    CO2 25 10/20/2021    INR 1.00 09/07/2021    LABA1C 5.4 10/16/2021    LABMICR 268.50 (H) 08/28/2021     Lab Results   Component Value Date    CKTOTAL 25 (L) 09/13/2021    TROPONINI 0.20 (H) 10/20/2021 Recent Imaging Results are Reviewed:  XR NECK SOFT TISSUE    Result Date: 10/20/2021  EXAMINATION: TWO XRAY VIEWS OF THE NECK SOFT TISSUES 10/20/2021 10:35 am COMPARISON: 10/16/2021 HISTORY: ORDERING SYSTEM PROVIDED HISTORY: stridor TECHNOLOGIST PROVIDED HISTORY: Reason for exam:->stridor Reason for Exam: Shortness of Breath (in by EMS from home was d/c yesterday after being inpatient for 3 days, patient states it was related to sob from missing dialysis, patient reporting she has a severe panic disorder and when she cant breath she panics and it causes this response ) Acuity: Acute Type of Exam: Initial FINDINGS: Epiglottis again appears normal.  No foreign body. No significant soft tissue swelling. Mild narrowing of the glottis again noted. Mild narrowing the glottis again noted Otherwise, unremarkable soft tissues of the neck     XR NECK SOFT TISSUE    Result Date: 10/16/2021  EXAMINATION: TWO XRAY VIEWS OF THE NECK SOFT TISSUES 10/16/2021 10:32 pm COMPARISON: None. HISTORY: ORDERING SYSTEM PROVIDED HISTORY: stridor TECHNOLOGIST PROVIDED HISTORY: Reason for exam:->stridor FINDINGS: The upper airway is patent. The retropharyngeal soft tissues and epiglottis are not thickened. There is narrowing at the glottis. No radiopaque foreign bodies are seen over the airway. Retropharyngeal soft tissues and epiglottis appear normal.  There is some narrowing at the glottis. CT SOFT TISSUE NECK W CONTRAST    Result Date: 10/20/2021  EXAMINATION: CT OF THE NECK SOFT TISSUE WITH CONTRAST  10/20/2021 TECHNIQUE: CT of the neck was performed with the administration of intravenous contrast. Multiplanar reformatted images are provided for review. Dose modulation, iterative reconstruction, and/or weight based adjustment of the mA/kV was utilized to reduce the radiation dose to as low as reasonably achievable.  COMPARISON: CTA head and neck 08/27/2020 HISTORY: ORDERING SYSTEM PROVIDED HISTORY: epiglottitis consolidation in both lungs. Findings suggest worsening pneumonia. Removal of ET tube since prior. A right central line with tips in the SVC. No pneumothorax. No pleural effusion. Cardiomediastinal silhouette and bony thorax are unchanged. Extensive multifocal airspace disease in both lungs worsening since prior examination suggesting worsening pneumonia. CT CHEST PULMONARY EMBOLISM W CONTRAST    Result Date: 10/16/2021  EXAMINATION: CTA OF THE CHEST 10/16/2021 8:43 pm TECHNIQUE: CTA of the chest was performed after the administration of intravenous contrast.  Multiplanar reformatted images are provided for review. MIP images are provided for review. Dose modulation, iterative reconstruction, and/or weight based adjustment of the mA/kV was utilized to reduce the radiation dose to as low as reasonably achievable. COMPARISON: None. HISTORY: ORDERING SYSTEM PROVIDED HISTORY: pe- dialysis patient - okay to contrast TECHNOLOGIST PROVIDED HISTORY: Reason for exam:->pe- dialysis patient - okay to contrast Decision Support Exception - unselect if not a suspected or confirmed emergency medical condition->Emergency Medical Condition (MA) Reason for Exam: Shortness of Breath (SOB with audbile wheezes for over one week. Denies pulmonary hx or exposure to recent illness; quit smoking 8 weeks ago & has anxiety. Reports was in the ICU for unknown reason & was placed on vent in August 2021; seen on 10/4 & needed blood transfusion.  ) FINDINGS: Pulmonary Arteries: Pulmonary arteries are adequately opacified for evaluation. No evidence of intraluminal filling defect to suggest pulmonary embolism. Main pulmonary artery is normal in caliber. Mediastinum: The heart is enlarged. The thoracic aorta and proximal great vessels are patent and of normal caliber. No pericardial effusion. No enlarged or suspicious axillary, hilar, or mediastinal lymphadenopathy. The thyroid gland is mildly enlarged and somewhat heterogeneous. The right jugular vein hemodialysis catheter ends in the upper right atrium. Lungs/pleura: The trachea and mainstem bronchi are widely patent. There is interlobular septal thickening with diffuse bilateral lower lobe airspace disease and ground-glass opacity. No cavitary lesions. No discrete pulmonary nodule or mass. Moderate right and small left pleural effusions. No pericardial effusion. No pneumothorax. Soft Tissues/Bones: Degenerative changes are present throughout the thoracic spine. Upper Abdomen: The visualized upper abdomen is unremarkable. No evidence of pulmonary embolism or acute thoracic aortic abnormality. Cardiomegaly with moderate pulmonary vascular congestive change, moderate right, and small left pleural effusions. Minimal superimposed bibasilar atelectasis. No significant pericardial effusion. Lab Results   Component Value Date    GLUCOSE 146 10/20/2021     Lab Results   Component Value Date    POCGLU 216 10/21/2021       Assessment and Plan:  Principal Problem:    Epiglottitis -Continue present orders/plan. Plan: cont on abx, iv steroids. Await ENT note and recs  Active Problems:    Type 2 diabetes mellitus, with long-term current use of insulin ([address] -Established problem. Stable. Plan: when able, resume [address] diet. Speech eval in process but needs more info from scope results    HTN (hypertension), benign -Established problem. Stable. 150/78  Plan: stay on same meds    ESRD (end stage renal disease) ([address] -Established problem. Stable. Plan: due for HD tomorrow.  Renal notified of admit      Dr Allan Baca to start covering me fri 10/22      Kaz Garibay MD  10/21/2021

## 2021-10-21 NOTE — PROGRESS NOTES
Data- discharge order received, pt verbalized agreement to discharge, disposition to previous residence, no needs for HHC/DME. Action- discharge instructions prepared and given to pt. And , pt verbalized understanding. Medication information packet given r/t NEW and/or CHANGED prescriptions emphasizing name/purpose/side effects, pt verbalized understanding. Discharge instruction summary: Diet- regular, thin liquids, no straws, Activity- as tolerated, Primary Care Physician as follows: Baljit Jay [phone] f/u appointment discussed and to be scheduled by pt., immunizations reviewed and up to date, prescription medications filled and sent to [facility] on [address] in [place]. Inpatient surgical procedure precautions reviewed    1. WEIGHT: Admit Weight: 185 lb (83.9 kg) (10/20/21 1018)        Today  Weight: 175 lb 7.8 oz (79.6 kg) (10/21/21 0627)       2. O2 SAT.: SpO2: 99 % (10/21/21 1153)    Response- Pt belongings gathered, IV removed. Disposition is home (no HHC/DME needs), transported with belongigngs, taken to lobby via w/c w/ belongings and , no complications.

## 2021-10-21 NOTE — CONSULTS
TAPE.    MEDICATIONS PRIOR TO ADMISSION:  Includes lorazepam, furosemide,  insulin, lisinopril, clonazepam, Bevespi, clonidine patch, fluoxetine,  dulaglutide, amlodipine, spironolactone, atorvastatin, aspirin,  pregabalin. SOCIAL HISTORY:  Former smoker. No alcohol or drug abuse. FAMILY HISTORY:  Includes diabetes, hypertension, colon cancer and  alcohol abuse. REVIEW OF SYSTEMS:  GENERAL:  Denies any malaise. SKIN:  No skin rash, itching or discharge. NEUROLOGIC:  No headaches or focal deficits. CARDIOVASCULAR:  No chest pain or palpitations. PULMONARY:  Shortness of breath is better. Intermittent coughing, no  hemoptysis. GI:  No abdominal pain. No nausea, no vomiting, no diarrhea or  constipation. RENAL:  Has residual kidney function. No gross hematuria. ENDOCRINE:  History of diabetes. No heat or cold intolerance. ID:  No fever or chills. MUSCULOSKELETAL:  Denies active joint pain. PHYSICAL EXAMINATION:  VITAL SIGNS:  Blood pressure 142/77, pulse 85, respirations 18,  temperature 98.8, saturating 99%. Weight is listed at 79.6 kg. GENERAL:  Appears well. HEENT:  Anicteric sclerae. Clear conjunctivae. SKIN:  Anicteric. No rash. CHEST:  Rhonchi bilaterally. HEART:  Regular. ABDOMEN:  Soft, nontender. No rebound or involuntary guarding. EXTREMITIES:  Shows no edema. LABORATORY DATA:  Sodium 137, potassium 4.5, chloride 98, bicarb 25, BUN  36, creatinine 4.8, glucose 146, calcium 9. Troponin 0.2. ProBNP  19,370. Albumin 4.2. WBC 19.7, hemoglobin 10.3, hematocrit 31.4,  platelet count 888,097. These are from yesterday. DIAGNOSTIC DATA:  Chest x-ray shows no acute process, stable  cardiomegaly. CT scan of the neck shows asymmetric thickening of the  right aryepiglottic fold which may reflect underlying edema and  supraglottitis, no cervical lymphadenopathy, multinodular thyroid gland. ASSESSMENT AND PLAN:  1. ESRD.   Outpatient hemodialysis Mondays, Wednesdays and Fridays at  [facility]. Has underlying residual kidney function. 3 liters  fluid removal done yesterday. The patient is symptomatically better  today. Electrolytes are acceptable. 2.  Electrolytes are acceptable. 3.  History of hypertension. Blood pressure is controlled. 4.  History of diabetes mellitus with diabetic neuropathy. Blood  glucose management as per Medicine. 5.  Anemia. Hemoglobin is acceptable. 6.  Shortness of breath, seen by ENT. 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 Follow up with  ENT. 7.  DISPOSITION:  Okay for discharge from renal perspective. Next  dialysis would be tomorrow. Thank you for your help in the care of the patient.         Tashia Roth MD    D: 10/21/2021 12:11:23       T: 10/21/2021 12:15:03     [email or web address]  Job#: [id number]     Doc#: [id number]    CC:

## 2021-10-22 ENCOUNTER — CARE COORDINATION (OUTPATIENT)
Dept: CASE MANAGEMENT | Age: 46
End: 2021-10-22

## 2021-10-22 DIAGNOSIS — J05.10 EPIGLOTTITIS: Primary | ICD-10-CM

## 2021-10-22 NOTE — CARE COORDINATION
[address] Transitions Initial Follow Up Call    Call within 2 business days of discharge: Yes    Patient: Wiht Cruz Patient : 1975   MRN: [id number]  Reason for Admission:   Discharge Date: 10/21/21 RARS: Readmission Risk Score: 37      Last Discharge [facility]       Complaint Diagnosis Description Type Department Provider    10/20/21 Shortness of Breath Stridor . .. ED to Hosp-Admission (Discharged) (ADMITTED) [facility] Quinn Leiva MD; Chaparrita Waller. .. Attempted to contact patient for initial follow up  transition call. She reports she is ok. She is currently at dialysis. Appetite and fluid intake is adequate. No problems with bladder or bowel elimination. She denies panic attack, sob, swelling in throat, fever, chills or pain. She has a little wheeze. Patient is taking her medication. No f/u appt made with PCP yet. Will continue to follow. Transitions of Care Initial Call    Was this an external facility discharge? No Discharge Facility: [place] [id number]    Challenges to be reviewed by the provider   Additional needs identified to be addressed with provider: No  none             Method of communication with provider : none      Advance Care Planning:   Does patient have an Advance Directive: not on file. Was this a readmission? Yes  Patient stated reason for admission: panic attack  Patients top risk factors for readmission: depression and ineffective coping, medical condition    Care Transition Nurse (CTN) contacted the patient by telephone to perform post hospital discharge assessment. Verified name and  with patient as identifiers. Provided introduction to self, and explanation of the CTN role. CTN reviewed discharge instructions, medical action plan and red flags with patient who verbalized understanding. Patient given an opportunity to ask questions and does not have any further questions or concerns at this time. Were discharge instructions available to patient?  No.

## 2021-10-23 NOTE — ED PROVIDER NOTES
As physician-in-triage, I performed a medical screening history and physical exam on [facility]. I also cared for and evaluated the patient in conjunction with the ED Advanced Practice Provider. All diagnostic, treatment, and disposition decisions were made by myself in conjunction with the advanced practice provider. For all further details of the patient's emergency department visit, please see the advanced practice provider's documentation. Patient presents to the ER for evaluation of positive stridor, prior history of intubation, she is asymmetric right area epiglottic fold edema mild white count elevation, end-stage renal disease on hemodialysis, some volitional component of this, possible mild vocal cord paresis or malfunction, no severe airway compromise currently, anxiolysis improved symptoms, positive antibiotics steroids reevaluation of clinical condition, ENT consultation. Total critical care time provided today was 35 minutes. This excludes seperately billable procedures and family discussion time. Critical care time provided for obtaining history, conducting a physical exam, performing and monitoring interventions, ordering, collecting and interpreting tests, and establishing medical decision-making. There was a potential for life/limb threatening pathology requiring close evaluation and intervention with concern for patient decompensation.         Impression: Stridor, hypopharyngeal edema, end-stage renal disease     Laura Gomez MD  50/44/27 [id number]

## 2021-10-24 LAB
BLOOD CULTURE, ROUTINE: NORMAL
CULTURE, BLOOD 2: NORMAL

## 2021-10-29 ENCOUNTER — CARE COORDINATION (OUTPATIENT)
Dept: CASE MANAGEMENT | Age: 46
End: 2021-10-29

## 2021-10-29 NOTE — CARE COORDINATION
[facility] Trans[facility] Follow Up Call    10/29/2021    Patient: Kelli Montanez  Patient : 1975   MRN: [id number]  Reason for Admission: paralyzed vocal cord, anxiety  Discharge Date: 10/21/21 RARS: Readmission Risk Score: 37         Spoke with: [facility] Follow Up Call    Needs to be reviewed by the provider   Additional needs identified to be addressed with provider: No  none             Method of communication with provider : phone      Care Transition Nurse (CTN) contacted the patient by telephone to follow up after admission. Verified name and  with patient as identifiers. Addressed changes since last contact: none  Discussed follow-up appointments. If no appointment was previously scheduled, appointment scheduling offered: Yes. Is follow up appointment scheduled within 7 days of discharge? Yes. Advance Care Planning:   Does patient have an Advance Directive: not on file. Advance Care Planning   Healthcare Decision Maker:      CTN reviewed discharge instructions, medical action plan and red flags with patient and discussed any barriers to care and/or understanding of plan of care after discharge. Discussed appropriate site of care based on symptoms and resources available to patient including: PCP and Specialist. The patient agrees to contact the PCP office for questions related to their healthcare. Patients top risk factors for readmission: medical condition-paralyzed vocal cord, anxiety    Patient verified  and was pleasant and agreeable to transition calls. Patient is notably wheezing. States she has been having some anxiety in the last 24-36 hours. States it is improved from yesterday. Patient is prescribed several MH medications. LPN CC encouraged patient to take as directed and encouraged use of PRN anxiety medications. States she will take clonazepam, but is out of lorazepam and missed her appointment for a refill.  Patient has no PCP f/u scheduled. LPN CC offered to make 3 way call to schedule HFU, patient agreed. Patient scheduled with PCP 11/1. Reports yesterday she had some rib pain, presumably r/t accessory muscle use d/t wheezing, states it has improved. Denies fevers/chills, N/V/D. Denies problems swallowing, eating, or drinking. Patient does sound a bit raspy. Weight today 178 on home scale. Reports sl edema in BLE. Scheduled for dialysis today, normally goes MWF. Does not have pulse ox to check oxygen level. Has attempted to contact therapist, but has not had success today. States she will try again. LPN CC encouraged patient to take clonazepam, try relaxation techniques from therapy, and to call PCP or go to ED with new or worsening symptoms. Patient verbalized understanding. Denies any acute needs at present time. Agreeable to f/u calls. Educated on the use of urgent care or physicians 24 hr access line if assistance is needed after hours. CTN provided contact information for future needs. Plan for follow-up call in 7-10 days based on severity of symptoms and risk factors. Rachelle Salmon LPN [address] [place] [address]  Care Transitions  [phone]    Care Transitions Subsequent and Final Call    Subsequent and Final Calls  Care Transitions Interventions  Other Interventions:            Follow Up  Future Appointments   Date Time Provider Ely Camacho   11/1/2021  5:30 -[facility], LPN

## 2021-11-01 ENCOUNTER — OFFICE VISIT (OUTPATIENT)
Dept: FAMILY MEDICINE CLINIC | Age: 46
End: 2021-11-01
Payer: COMMERCIAL

## 2021-11-01 VITALS
DIASTOLIC BLOOD PRESSURE: 82 MMHG | HEIGHT: 67 IN | RESPIRATION RATE: 18 BRPM | HEART RATE: 90 BPM | TEMPERATURE: 97.2 F | SYSTOLIC BLOOD PRESSURE: 124 MMHG | OXYGEN SATURATION: 90 % | BODY MASS INDEX: 29.82 KG/M2 | WEIGHT: 190 LBS

## 2021-11-01 DIAGNOSIS — F41.0 GENERALIZED ANXIETY DISORDER WITH PANIC ATTACKS: ICD-10-CM

## 2021-11-01 DIAGNOSIS — N18.6 END-STAGE RENAL DISEASE ON HEMODIALYSIS (HCC): ICD-10-CM

## 2021-11-01 DIAGNOSIS — J44.9 CHRONIC OBSTRUCTIVE PULMONARY DISEASE, UNSPECIFIED COPD TYPE (HCC): ICD-10-CM

## 2021-11-01 DIAGNOSIS — Z09 HOSPITAL DISCHARGE FOLLOW-UP: Primary | ICD-10-CM

## 2021-11-01 DIAGNOSIS — Z99.2 END-STAGE RENAL DISEASE ON HEMODIALYSIS (HCC): ICD-10-CM

## 2021-11-01 DIAGNOSIS — F41.1 GENERALIZED ANXIETY DISORDER WITH PANIC ATTACKS: ICD-10-CM

## 2021-11-01 DIAGNOSIS — Z79.899 CHRONIC USE OF BENZODIAZEPINE FOR THERAPEUTIC PURPOSE: ICD-10-CM

## 2021-11-01 DIAGNOSIS — J38.01 PARALYSIS OF RIGHT VOCAL CORD: ICD-10-CM

## 2021-11-01 DIAGNOSIS — J38.7 EPIGLOTTIC LESION: ICD-10-CM

## 2021-11-01 DIAGNOSIS — F42.9 OBSESSIVE-COMPULSIVE DISORDER, UNSPECIFIED TYPE: ICD-10-CM

## 2021-11-01 DIAGNOSIS — E11.49 OTHER DIABETIC NEUROLOGICAL COMPLICATION ASSOCIATED WITH TYPE 2 DIABETES MELLITUS (HCC): ICD-10-CM

## 2021-11-01 DIAGNOSIS — I10 ESSENTIAL HYPERTENSION: ICD-10-CM

## 2021-11-01 PROCEDURE — 99214 OFFICE O/P EST MOD 30 MIN: CPT | Performed by: FAMILY MEDICINE

## 2021-11-01 PROCEDURE — 1111F DSCHRG MED/CURRENT MED MERGE: CPT | Performed by: FAMILY MEDICINE

## 2021-11-01 RX ORDER — ALPRAZOLAM 2 MG/1
2 TABLET, EXTENDED RELEASE ORAL EVERY MORNING
Qty: 30 TABLET | Refills: 0 | Status: ON HOLD | OUTPATIENT
Start: 2021-11-01 | End: 2021-11-05 | Stop reason: SDUPTHER

## 2021-11-01 RX ORDER — ALBUTEROL SULFATE 90 UG/1
2 AEROSOL, METERED RESPIRATORY (INHALATION) EVERY 6 HOURS PRN
Qty: 18 G | Refills: 1 | Status: SHIPPED | OUTPATIENT
Start: 2021-11-01 | End: 2021-11-30 | Stop reason: SDUPTHER

## 2021-11-01 RX ORDER — PREGABALIN 75 MG/1
75 CAPSULE ORAL NIGHTLY
Qty: 30 CAPSULE | Refills: 0 | Status: ON HOLD | OUTPATIENT
Start: 2021-11-01 | End: 2021-11-05 | Stop reason: SDUPTHER

## 2021-11-01 RX ORDER — CLONIDINE 0.2 MG/24H
1 PATCH, EXTENDED RELEASE TRANSDERMAL WEEKLY
Qty: 4 PATCH | Refills: 2 | Status: ON HOLD
Start: 2021-11-01 | End: 2022-01-21 | Stop reason: HOSPADM

## 2021-11-01 RX ORDER — FLUVOXAMINE MALEATE 50 MG/1
50 TABLET, COATED ORAL NIGHTLY
Qty: 30 TABLET | Refills: 1 | Status: SHIPPED | OUTPATIENT
Start: 2021-11-01 | End: 2021-11-30 | Stop reason: DRUGHIGH

## 2021-11-01 RX ORDER — BENZONATATE 200 MG/1
200 CAPSULE ORAL EVERY 8 HOURS PRN
Qty: 15 CAPSULE | Refills: 1 | Status: SHIPPED | OUTPATIENT
Start: 2021-11-01 | End: 2021-11-30 | Stop reason: SDUPTHER

## 2021-11-01 RX ORDER — PREDNISONE 20 MG/1
40 TABLET ORAL DAILY
Qty: 10 TABLET | Refills: 0 | Status: SHIPPED | OUTPATIENT
Start: 2021-11-01 | End: 2021-11-06

## 2021-11-01 NOTE — PROGRESS NOTES
Elena Moe   55 y.o. female   1975    HPI:    Patient was last seen by me on 9/23/2021. Reason(s) for visit:   Chief Complaint   Patient presents with    Follow-Up from Hospital       Patient was accompanied by her  today. She was using a walker for assistance. Her  is her sole caretaker. No report of home health services. Since her last office visit, [facility], and most recently on 10/20/2021. She was hospitalized twice on the last 2 dates. She was recently discharged on 10/21/2021 from there. During one of her admissions for stable \"stridor\", she was treated with racemic epinephrine and had some partial benefit. Pulmonology was consulted and no further work-up was done. She had a CT neck that was ordered by the ER, which showed asymmetric thickening of the right aryepiglottic fold and mass on her right vocal cord. She was tested most recently for COVID-19 on 10/16 and it was negative. ENT was consulted and was treated with Clindamycin and IV steroids. 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 Otherwise, her airway was patent and adequate. She was advised to continue the clindamycin and follow-up with ENT around 10/27. Since her hospital stay, patient's  reported still having persistent cough that after some a while could cough up yellow phlegm w/o blood. She also reported of difficulty of breathing, which causes her to have panic attack. Patient stated that [facility] has been calling her multiple times a day to check up on her wellbeing since she had left the hospital.  She stated that she has also developed nasal congestion that stared earlier in the day.    She has been taking the Clindamycin that was prescribed after discharge as directed and has had no side effects including diarrhea. She stated that she has been using Breztri BID as directed, which had been prescribed by me. She stated that she has noticed no benefit with using it, but no side effects as well. She stated that she does not have a nebulizer as well as a pulse oximeter at home. Patient reported that she was told that her \"stridor\" was caused by her recent history of being on mechanical ventilation in August 2021. Regarding her issues of ESRD, she was seen by her nephrologist during one of her hospitalizations stays and was decided to be on hemodialysis. She stated that she has hemodialysis on M, W, and F. She stated that she has been compliant with attending her sessions. She reported only urinating about twice a day since her hospital discharge. During her issues of chronic anxiety and panic attack, patient stated that she has tried multiple benzodiazepines such as alprazolam, clonazepam, and others. She stated that the only one that has been effective has been lorazepam and reported of taking it 2-3 times a day to help control her anxiety. She stated that she has only tried sertraline and fluoxetine. She stated that fluoxetine has been overall effective with treating her anxiety/depression. She also reported issues with having OCD. She stated that she was seeing a psychiatrist with [facility] in [place] and from what sounds like she was seeing various resident physicians and as a result was not usually seen by the same physician. She stated that she has tried to make an appointment, but this keeps getting postponed for unclear reasons.   She vocalized a desire for refill of her lorazepam.      Allergies   Allergen Reactions    Amoxicillin Itching and Hives     Tolerates cephalosporins  Patient tolerating cefazolin (ANCEF) as of October 11, 2018  Patient tolerating cefazolin (ANCEF) as of October 11, 2018  Tolerates cephalosporins  Patient tolerating cefazolin (ANCEF) as of October 11, 2018    Levofloxacin Anaphylaxis    Vancomycin Shortness Of Breath, Hives and Anaphylaxis    Tape [Adhesive Tape] Other (See Comments)     Paper tape turns skin bright red. Plastic tape okay. Current Outpatient Medications on File Prior to Visit   Medication Sig Dispense Refill    albuterol sulfate HFA (PROVENTIL HFA) 108 (90 Base) MCG/ACT inhaler Inhale 2 puffs into the lungs every 4 hours as needed for Wheezing 18 g 0    LORazepam (ATIVAN) 0.5 MG tablet Take 0.5 mg by mouth every 8 hours as needed for Anxiety.  insulin glargine (LANTUS SOLOSTAR) 100 UNIT/ML injection pen Inject 20 Units into the skin every morning       lisinopril (PRINIVIL;ZESTRIL) 40 MG tablet Take 40 mg by mouth daily      clonazePAM (KLONOPIN) 0.5 MG tablet Take 0.5 mg by mouth 2 times daily as needed.  glycopyrrolate-formoterol (BEVESPI AEROSPHERE) 9-4.8 MCG/ACT AERO Inhale 2 puffs into the lungs 2 times daily 10.7 g 2    glucose (GLUTOSE) 40 % GEL Take 37.5 mLs by mouth as needed (low blood sugar) 45 g 1    Dulaglutide 0.75 MG/0.5ML SOPN Inject 0.75 mg into the skin once a week 4 pen 1    spironolactone (ALDACTONE) 50 MG tablet Take 1 tablet by mouth daily 30 tablet 2    atorvastatin (LIPITOR) 40 MG tablet Take 1 tablet by mouth nightly 30 tablet 3    Insulin Pen Needle 29G X 12.7MM MISC 1 each by Does not apply route daily 100 each 5    aspirin 81 MG EC tablet Take 1 tablet by mouth daily 30 tablet 3    insulin lispro, 1 Unit Dial, 100 UNIT/ML SOPN Inject 0-6 Units into the skin 3 times daily (with meals) **Corrective Low Dose Algorithm**  Glucose: Dose:               No Insulin  140-199 1 Unit  200-249 2 Units  250-299 3 Units  300-349 4 Units  350-399 5 Units  Over 399 6 Units 3 pen 0     No current facility-administered medications on file prior to visit.         Family History   Problem Relation Age of Onset  Diabetes Mother    Felipe Coley Other Mother 79        Covid    Diabetes Father     High Blood Pressure Father     Colon Cancer Father     Diabetes Sister     Alcohol Abuse Maternal Grandfather     Diabetes Paternal Grandmother     Alcohol Abuse Paternal Grandfather     Diabetes Paternal Aunt     Diabetes Paternal Uncle        Social History     Tobacco Use    Smoking status: Former Smoker     Packs/day: 1.00     Types: Cigarettes    Smokeless tobacco: Never Used    Tobacco comment: Quit in August 2021   Substance Use Topics    Alcohol use: No     Comment: Very Rare        Lab Results   Component Value Date    WBC 17.7 (H) 11/02/2021    HGB 10.8 (L) 11/02/2021    HCT 35.0 (L) 11/02/2021    MCV 88.1 11/02/2021     11/02/2021       Chemistry        Component Value Date/Time     11/02/2021 2035    K 4.4 11/02/2021 2035    K 4.5 10/20/2021 1053    CL 97 (L) 11/02/2021 2035    CO2 31 11/02/2021 2035    BUN 34 (H) 11/02/2021 2035    CREATININE 3.8 (H) 11/02/2021 2035        Component Value Date/Time    CALCIUM 9.3 11/02/2021 2035    ALKPHOS 130 (H) 11/02/2021 2035    AST 14 (L) 11/02/2021 2035    ALT 11 11/02/2021 2035    BILITOT 0.3 11/02/2021 2035          Lab Results   Component Value Date    ALT 11 11/02/2021    AST 14 (L) 11/02/2021    ALKPHOS 130 (H) 11/02/2021    BILITOT 0.3 11/02/2021     Lab Results   Component Value Date    LABA1C 5.3 10/20/2021     Lab Results   Component Value Date    .4 10/20/2021       Review of Systems   Constitutional: Positive for activity change and fatigue. Negative for appetite change, fever and unexpected weight change. HENT: Positive for congestion, postnasal drip and rhinorrhea. Negative for sinus pressure and trouble swallowing. Respiratory: Positive for cough and shortness of breath. Negative for chest tightness and wheezing. Cardiovascular: Negative for chest pain, palpitations and leg swelling.    Gastrointestinal: Negative for abdominal Stridor present. Wheezing present. No rhonchi or rales. Abdominal:      General: Abdomen is flat. There is no distension. Palpations: Abdomen is soft. Musculoskeletal:         General: No deformity. Normal range of motion. Cervical back: Normal range of motion. No erythema. Right lower leg: No edema. Left lower leg: No edema. Skin:     Coloration: Skin is not cyanotic, jaundiced or pale. Findings: No abrasion, abscess, bruising, ecchymosis, erythema, signs of injury, laceration, lesion, petechiae, rash or wound. Neurological:      General: No focal deficit present. Mental Status: She is alert. Mental status is at baseline. GCS: GCS eye subscore is 4. GCS verbal subscore is 5. GCS motor subscore is 6. Cranial Nerves: No cranial nerve deficit, dysarthria or facial asymmetry. Motor: Weakness present. No tremor, atrophy or seizure activity. Gait: Gait normal.   Psychiatric:         Attention and Perception: Attention and perception normal.         Mood and Affect: Mood and affect normal.         Speech: Speech normal.         Behavior: Behavior normal. Behavior is cooperative. Thought Content: Thought content normal.       Assessment/Plan:   Lisa Hooper was seen today for follow-up from hospital.    Diagnoses and all orders for this visit:    Hospital discharge follow-up  Comments:  Patient has had recurrent ER/hospitalizations partly due to panic attacks. Per patient, she has had issues with seeing her psychiatrist. Will treat her issues of anxiety/depression for now. Orders:  -     DC DISCHARGE MEDS RECONCILED W/ CURRENT OUTPATIENT MED LIST    End-stage renal disease on hemodialysis ([facility] [address]  Comments:  Managed by nephrologist. Rao Chavarria to not missed her hemodialysis sessions. Postextubation stridor  Comments:  Advised to follow up with ENT. Orders:  -     benzonatate (TESSALON) 200 MG capsule;  Take 1 capsule by mouth every 8 hours as needed for Cough  - predniSONE (DELTASONE) 20 MG tablet; Take 2 tablets by mouth daily for 5 days  -     Nasal Dilators (BREATHE RIGHT EXTRA STRENGTH) STRP; 1 strip by Does not apply route nightly as needed (nasal congestion)    Generalized anxiety disorder with panic attacks  Comments:  Informed patient that Fluvoxamine is approved for treatment of anxiety/depression and OCD. Will switch from Lorazepam (TID PRN) to Alprazolam ER to help simplify her regiment. I discussed with patient the difference between acute vs preventative/maintenance medications regarding treatment of anxiety/depression/bipolar disorder. Drugs of the SSRI/SNRI class as well as anti-psychotics can have side effects such as weight gain, sexual dysfunction, insomnia, headache, abdominal discomfort, nausea, vomiting, etc. These medications are generally effective at alleviating symptoms of anxiety and/or depression, but side effects tend to occur within the first 1-2 weeks of taking the medication. Patient was informed to let me know if any significant side effects do occur. Patient was instructed to take the medication(s) every day as directed and that this medication is not an as needed medication because the medication may take at least 2 weeks to see a difference if not at least 4-6 weeks to have a beneficial effect and that by going even 1-2 days without taking the medication one could risk of having rebound anxiety/depression. In addition, the patient was informed that this medication cannot be abruptly stopped after having had taken it for a long period of time and that the medication would have to be gradually tapered off under the guidance of a healthcare provider. The patient was informed that 4-6 weeks follow-up is generally recommended follow-up time for starting/adding a new medication or with adjusting the dose of any given medication.      I have discussed with patient (at some point) and made aware that some patients may benefit from more than one medication compared to monotherapy. A combination of both medical and behavioral therapy may yield better/more effective results than either therapy alone. Lastly, it's worth mentioning that the medication(s) prescribed will not completely resolve feelings of anxiety/depression as well as make one immune or completely prevent from having more issues with anxiety and/or depression. Stress (emotional & physical) is part of everyday life. I discussed with patient about the potential benefits of GeneSight testing. Patient was informed that it is a \"genetics test\" that provides a comprehensive report on medications used primarily for treatment of anxiety/depression, mood disorders as well as conditions regarding sleep disturbances and ADHD. The main potential benefit with this test is that it may help provide a list of medication(s) that may most likely cause side effects, medication(s) that may be overall ineffective, and medication(s) that may have a increase risk of drug-to-drug interactions. The report will also include a list of medication(s) (of which there are several to choose from) that may provide notable positive benefit as well as those that carry overall lower risk of having side effect(s). Based on all this, GeneSight testing could essentially help with minimizing the trial and error with selecting medications. Therefore this test would be used as a tool or guide combined with my own judgement and clinical experience in selecting the appropriate medical therapy for the individual patient. After reviewing the potential benefits of having the GeneSight test, the patient understood my recommendation for testing and offered their consent for treatment. Ordered GeneSight kit to be sent to her home. Orders:  -     fluvoxaMINE (LUVOX) 50 MG tablet; Take 1 tablet by mouth nightly  -     ALPRAZolam (XANAX XR) 2 MG extended release tablet;  Take 1 tablet by mouth every morning for 30 days.    Chronic use of benzodiazepine for therapeutic purpose  Comments:  I discussed the long term side effects of benzodiazepines with the patient which include (but not all): cognitive impairment (drowsiness, increased reaction time, ataxia, motor incoordination, anterograde amnesia), mood swings, sleep problems, decreased respiratory drive especially with concomitant use of opioids and/or alcohol use, etc.  It's also of concern that patients can develop issues with dependence, abuse, intolerance as well as issues with withdrawal, especially with suddenly discontinuing it. Patient was informed that long-term use can lead to physiological dependence after just a few weeks and especially after months of use. Patient was also informed that benzodiazepines are often not used nowadays as first-line treatment for anxiety, depression or bipolar issues because of the aforementioned reasons and because there are just other better overall more effective medications widely available (e.g. SSRI or SNRI or anti-psychotics). Obsessive-compulsive disorder, unspecified type  -     fluvoxaMINE (LUVOX) 50 MG tablet; Take 1 tablet by mouth nightly    Essential hypertension  Comments:  Controlled and stable on current med regiment. Refill of Clonidine as requested. Orders:  -     cloNIDine (CATAPRES) 0.2 MG/24HR PTWK; Place 1 patch onto the skin once a week    Chronic obstructive pulmonary disease, unspecified COPD type ([facility] [address]  Comments:  Discussed trial of Trelegy since no significant difference using Breztri. Patient declined. Will order medical supplies as requested below. Advised to continue using incentive spirometer multiple times a day. Orders:  -     albuterol sulfate  (90 Base) MCG/ACT inhaler; Inhale 2 puffs into the lungs every 6 hours as needed for Wheezing or Shortness of Breath  -     Misc.  Devices (PULSE OXIMETER) MISC; 1 each by Does not apply route every 6-8 hours as needed (shortness of breath)  - DME Order for Nebulizer as OP    Other diabetic neurological complication associated with type 2 diabetes mellitus ([facility] [address]  Comments:  DM controlled. Lyrica refilled as requested. Orders:  -     pregabalin (LYRICA) 75 MG capsule; Take 1 capsule by mouth nightly for 30 days. I reviewed the plan of care with the patient. Patient acknowledged understanding and agreed with plan of care overall. Medications Discontinued During This Encounter   Medication Reason    FLUoxetine (PROZAC) 20 MG capsule Alternate therapy    cloNIDine (CATAPRES) 0.2 MG/24HR PTWK LIST CLEANUP    furosemide (LASIX) 80 MG tablet LIST CLEANUP    propranolol (INDERAL LA) 80 MG extended release capsule LIST CLEANUP    amLODIPine (NORVASC) 10 MG tablet LIST CLEANUP    pregabalin (LYRICA) 75 MG capsule REORDER        General information on medications:  -When it comes to medications, whether with starting or adding a new medication or increasing the dose of a current medication, the benefits and risks have to always be considered and weighed over, especially if one is taking other medications as well. -There are no medications that have no side effects and that there is always a risk involved with taking a medication.    -If a side effect were to occur with starting a new medication or with increasing the dose of a current medication that either the medication can be totally discontinued altogether or simply decrease the dose of it and if this would be the case a follow-up appointment would be deemed necessary.    -The drug allergy list will then be updated with the corresponding side effect(s) if it's deemed to be a true 'drug allergy'.     -The most common adverse effects of medication(s) were addressed at today's visit.    -Lastly, the coverage status of a medication may vary from insurance to insurance and the only way to verify if the medication is covered is to send an actual prescription in.    -The drug formulary of each insurance changes without any warning or notification to the healthcare provider let alone the pharmacy.  -The cost of medications vary from insurance to insurance and the cost is always subject to change just like the drug formulary. Follow-up: Return in about 4 weeks (around 11/29/2021) for IP - started on Luvox. .     Patient was informed that if his or her symptoms worsen to follow up with me sooner or go to the nearest ER if the symptoms are very significant and warrant higher level of care. Regarding my note:  -This note was composed (by me only and not with assistance via a scribe) to the best of my knowledge and recollection of the encounter with the patient using one of my own customized note templates utilizing a combination of typing and dictating with the [address] [place] [address] speech recognition software. As a result, the note may possibly contain various errors (e.g. spelling, grammar, and non-sensible words/phrases/statements) despite reviewing the note prior to signing it for completion. Damon Matthew M.D.   [address] [place] [address]    Electronically signed by Basil Macias M.D. on 11/2/2021 at 9:27 PM.

## 2021-11-02 ENCOUNTER — HOSPITAL ENCOUNTER (INPATIENT)
Age: 46
LOS: 3 days | Discharge: HOME HEALTH | DRG: 194 | End: 2021-11-05
Attending: EMERGENCY MEDICINE | Admitting: INTERNAL MEDICINE
Payer: COMMERCIAL

## 2021-11-02 ENCOUNTER — TELEPHONE (OUTPATIENT)
Dept: OTHER | Facility: CLINIC | Age: 46
End: 2021-11-02

## 2021-11-02 ENCOUNTER — APPOINTMENT (OUTPATIENT)
Dept: GENERAL RADIOLOGY | Age: 46
DRG: 194 | End: 2021-11-02
Payer: COMMERCIAL

## 2021-11-02 DIAGNOSIS — E11.49 OTHER DIABETIC NEUROLOGICAL COMPLICATION ASSOCIATED WITH TYPE 2 DIABETES MELLITUS (HCC): ICD-10-CM

## 2021-11-02 DIAGNOSIS — Z99.2 ESRD ON DIALYSIS (HCC): ICD-10-CM

## 2021-11-02 DIAGNOSIS — F41.0 GENERALIZED ANXIETY DISORDER WITH PANIC ATTACKS: ICD-10-CM

## 2021-11-02 DIAGNOSIS — R53.1 GENERAL WEAKNESS: Primary | ICD-10-CM

## 2021-11-02 DIAGNOSIS — F41.1 GENERALIZED ANXIETY DISORDER WITH PANIC ATTACKS: ICD-10-CM

## 2021-11-02 DIAGNOSIS — N18.6 ESRD ON DIALYSIS (HCC): ICD-10-CM

## 2021-11-02 DIAGNOSIS — R77.8 ELEVATED TROPONIN: ICD-10-CM

## 2021-11-02 DIAGNOSIS — J96.01 ACUTE RESPIRATORY FAILURE WITH HYPOXIA (HCC): ICD-10-CM

## 2021-11-02 LAB
A/G RATIO: 1.1 (ref 1.1–2.2)
ALBUMIN SERPL-MCNC: 3.9 G/DL (ref 3.4–5)
ALP BLD-CCNC: 130 U/L (ref 40–129)
ALT SERPL-CCNC: 11 U/L (ref 10–40)
ANION GAP SERPL CALCULATED.3IONS-SCNC: 9 MMOL/L (ref 3–16)
AST SERPL-CCNC: 14 U/L (ref 15–37)
BASE EXCESS VENOUS: 2.6 MMOL/L (ref -3–3)
BASOPHILS ABSOLUTE: 0.1 K/UL (ref 0–0.2)
BASOPHILS RELATIVE PERCENT: 0.4 %
BILIRUB SERPL-MCNC: 0.3 MG/DL (ref 0–1)
BILIRUBIN URINE: NEGATIVE
BLOOD, URINE: ABNORMAL
BUN BLDV-MCNC: 34 MG/DL (ref 7–20)
CALCIUM SERPL-MCNC: 9.3 MG/DL (ref 8.3–10.6)
CARBOXYHEMOGLOBIN: 3.2 % (ref 0–1.5)
CHLORIDE BLD-SCNC: 97 MMOL/L (ref 99–110)
CLARITY: CLEAR
CO2: 31 MMOL/L (ref 21–32)
COLOR: YELLOW
COMMENT UA: NORMAL
CREAT SERPL-MCNC: 3.8 MG/DL (ref 0.6–1.1)
EOSINOPHILS ABSOLUTE: 0.2 K/UL (ref 0–0.6)
EOSINOPHILS RELATIVE PERCENT: 1.2 %
EPITHELIAL CELLS, UA: NORMAL /HPF (ref 0–5)
GFR AFRICAN AMERICAN: 15
GFR NON-AFRICAN AMERICAN: 13
GLUCOSE BLD-MCNC: 118 MG/DL (ref 70–99)
GLUCOSE URINE: 100 MG/DL
HCO3 VENOUS: 30 MMOL/L (ref 23–29)
HCT VFR BLD CALC: 35 % (ref 36–48)
HEMOGLOBIN: 10.8 G/DL (ref 12–16)
KETONES, URINE: NEGATIVE MG/DL
LEUKOCYTE ESTERASE, URINE: NEGATIVE
LIPASE: 18 U/L (ref 13–60)
LYMPHOCYTES ABSOLUTE: 1.1 K/UL (ref 1–5.1)
LYMPHOCYTES RELATIVE PERCENT: 6.3 %
MCH RBC QN AUTO: 27.2 PG (ref 26–34)
MCHC RBC AUTO-ENTMCNC: 30.8 G/DL (ref 31–36)
MCV RBC AUTO: 88.1 FL (ref 80–100)
METHEMOGLOBIN VENOUS: 0.3 %
MICROSCOPIC EXAMINATION: YES
MONOCYTES ABSOLUTE: 1.1 K/UL (ref 0–1.3)
MONOCYTES RELATIVE PERCENT: 5.9 %
NEUTROPHILS ABSOLUTE: 15.2 K/UL (ref 1.7–7.7)
NEUTROPHILS RELATIVE PERCENT: 86.2 %
NITRITE, URINE: NEGATIVE
O2 CONTENT, VEN: 14 VOL %
O2 SAT, VEN: 100 %
O2 THERAPY: ABNORMAL
PCO2, VEN: 60.7 MMHG (ref 40–50)
PDW BLD-RTO: 16.9 % (ref 12.4–15.4)
PH UA: 7 (ref 5–8)
PH VENOUS: 7.3 (ref 7.35–7.45)
PLATELET # BLD: 274 K/UL (ref 135–450)
PMV BLD AUTO: 8.1 FL (ref 5–10.5)
PO2, VEN: 155 MMHG (ref 25–40)
POTASSIUM SERPL-SCNC: 4.4 MMOL/L (ref 3.5–5.1)
PRO-BNP: ABNORMAL PG/ML (ref 0–124)
PROTEIN UA: >300 MG/DL
RBC # BLD: 3.97 M/UL (ref 4–5.2)
RBC UA: NORMAL /HPF (ref 0–4)
SODIUM BLD-SCNC: 137 MMOL/L (ref 136–145)
SPECIFIC GRAVITY UA: 1.01 (ref 1–1.03)
TCO2 CALC VENOUS: 71 MMOL/L
TOTAL PROTEIN: 7.3 G/DL (ref 6.4–8.2)
TROPONIN: 0.35 NG/ML
URINE REFLEX TO CULTURE: ABNORMAL
URINE TYPE: ABNORMAL
UROBILINOGEN, URINE: 0.2 E.U./DL
WBC # BLD: 17.7 K/UL (ref 4–11)
WBC UA: NORMAL /HPF (ref 0–5)

## 2021-11-02 PROCEDURE — 83036 HEMOGLOBIN GLYCOSYLATED A1C: CPT

## 2021-11-02 PROCEDURE — 83690 ASSAY OF LIPASE: CPT

## 2021-11-02 PROCEDURE — 84484 ASSAY OF TROPONIN QUANT: CPT

## 2021-11-02 PROCEDURE — 71045 X-RAY EXAM CHEST 1 VIEW: CPT

## 2021-11-02 PROCEDURE — 6360000002 HC RX W HCPCS: Performed by: EMERGENCY MEDICINE

## 2021-11-02 PROCEDURE — 94640 AIRWAY INHALATION TREATMENT: CPT

## 2021-11-02 PROCEDURE — 80053 COMPREHEN METABOLIC PANEL: CPT

## 2021-11-02 PROCEDURE — 85025 COMPLETE CBC W/AUTO DIFF WBC: CPT

## 2021-11-02 PROCEDURE — 93005 ELECTROCARDIOGRAM TRACING: CPT | Performed by: PHYSICIAN ASSISTANT

## 2021-11-02 PROCEDURE — 99284 EMERGENCY DEPT VISIT MOD MDM: CPT

## 2021-11-02 PROCEDURE — 36415 COLL VENOUS BLD VENIPUNCTURE: CPT

## 2021-11-02 PROCEDURE — 81001 URINALYSIS AUTO W/SCOPE: CPT

## 2021-11-02 PROCEDURE — 1200000000 HC SEMI PRIVATE

## 2021-11-02 PROCEDURE — 82803 BLOOD GASES ANY COMBINATION: CPT

## 2021-11-02 PROCEDURE — 83880 ASSAY OF NATRIURETIC PEPTIDE: CPT

## 2021-11-02 PROCEDURE — 6370000000 HC RX 637 (ALT 250 FOR IP): Performed by: EMERGENCY MEDICINE

## 2021-11-02 RX ORDER — IPRATROPIUM BROMIDE AND ALBUTEROL SULFATE 2.5; .5 MG/3ML; MG/3ML
1 SOLUTION RESPIRATORY (INHALATION) ONCE
Status: COMPLETED | OUTPATIENT
Start: 2021-11-02 | End: 2021-11-02

## 2021-11-02 RX ORDER — DEXAMETHASONE SODIUM PHOSPHATE 4 MG/ML
8 INJECTION, SOLUTION INTRA-ARTICULAR; INTRALESIONAL; INTRAMUSCULAR; INTRAVENOUS; SOFT TISSUE ONCE
Status: COMPLETED | OUTPATIENT
Start: 2021-11-02 | End: 2021-11-02

## 2021-11-02 RX ADMIN — ALBUTEROL SULFATE 2.5 MG: 2.5 SOLUTION RESPIRATORY (INHALATION) at 22:22

## 2021-11-02 RX ADMIN — DEXAMETHASONE SODIUM PHOSPHATE 8 MG: 4 INJECTION, SOLUTION INTRAMUSCULAR; INTRAVENOUS at 22:57

## 2021-11-02 RX ADMIN — IPRATROPIUM BROMIDE AND ALBUTEROL SULFATE 1 AMPULE: .5; 3 SOLUTION RESPIRATORY (INHALATION) at 22:22

## 2021-11-02 ASSESSMENT — ENCOUNTER SYMPTOMS
COUGH: 0
WHEEZING: 0
ABDOMINAL PAIN: 0
DIARRHEA: 0
ABDOMINAL DISTENTION: 0
DIARRHEA: 0
SINUS PRESSURE: 0
SHORTNESS OF BREATH: 0
RHINORRHEA: 1
SHORTNESS OF BREATH: 1
CHEST TIGHTNESS: 0
COUGH: 1
BACK PAIN: 0
ABDOMINAL PAIN: 0
WHEEZING: 0
RHINORRHEA: 0
NAUSEA: 0
VOMITING: 0
NAUSEA: 0
CONSTIPATION: 0
BLOOD IN STOOL: 0
TROUBLE SWALLOWING: 0
VOMITING: 0

## 2021-11-03 LAB
A/G RATIO: 1 (ref 1.1–2.2)
ALBUMIN SERPL-MCNC: 3.4 G/DL (ref 3.4–5)
ALP BLD-CCNC: 125 U/L (ref 40–129)
ALT SERPL-CCNC: 10 U/L (ref 10–40)
ANION GAP SERPL CALCULATED.3IONS-SCNC: 11 MMOL/L (ref 3–16)
AST SERPL-CCNC: 19 U/L (ref 15–37)
BILIRUB SERPL-MCNC: 0.3 MG/DL (ref 0–1)
BUN BLDV-MCNC: 38 MG/DL (ref 7–20)
C-REACTIVE PROTEIN: 14.4 MG/L (ref 0–5.1)
CALCIUM SERPL-MCNC: 9 MG/DL (ref 8.3–10.6)
CHLORIDE BLD-SCNC: 96 MMOL/L (ref 99–110)
CO2: 28 MMOL/L (ref 21–32)
CREAT SERPL-MCNC: 4.4 MG/DL (ref 0.6–1.1)
EKG ATRIAL RATE: 81 BPM
EKG DIAGNOSIS: NORMAL
EKG P AXIS: 29 DEGREES
EKG P-R INTERVAL: 154 MS
EKG Q-T INTERVAL: 398 MS
EKG QRS DURATION: 84 MS
EKG QTC CALCULATION (BAZETT): 462 MS
EKG R AXIS: 21 DEGREES
EKG T AXIS: 81 DEGREES
EKG VENTRICULAR RATE: 81 BPM
ESTIMATED AVERAGE GLUCOSE: 102.5 MG/DL
GFR AFRICAN AMERICAN: 13
GFR NON-AFRICAN AMERICAN: 11
GLUCOSE BLD-MCNC: 148 MG/DL (ref 70–99)
GLUCOSE BLD-MCNC: 168 MG/DL (ref 70–99)
GLUCOSE BLD-MCNC: 177 MG/DL (ref 70–99)
GLUCOSE BLD-MCNC: 398 MG/DL (ref 70–99)
GLUCOSE BLD-MCNC: 432 MG/DL (ref 70–99)
GLUCOSE BLD-MCNC: 93 MG/DL (ref 70–99)
HBA1C MFR BLD: 5.2 %
PERFORMED ON: ABNORMAL
PERFORMED ON: NORMAL
POTASSIUM REFLEX MAGNESIUM: 5.1 MMOL/L (ref 3.5–5.1)
POTASSIUM SERPL-SCNC: 5.1 MMOL/L (ref 3.5–5.1)
SODIUM BLD-SCNC: 135 MMOL/L (ref 136–145)
TOTAL PROTEIN: 6.8 G/DL (ref 6.4–8.2)
TROPONIN: 0.3 NG/ML

## 2021-11-03 PROCEDURE — 1200000000 HC SEMI PRIVATE

## 2021-11-03 PROCEDURE — 86140 C-REACTIVE PROTEIN: CPT

## 2021-11-03 PROCEDURE — 93010 ELECTROCARDIOGRAM REPORT: CPT | Performed by: INTERNAL MEDICINE

## 2021-11-03 PROCEDURE — 2700000000 HC OXYGEN THERAPY PER DAY

## 2021-11-03 PROCEDURE — 80053 COMPREHEN METABOLIC PANEL: CPT

## 2021-11-03 PROCEDURE — 90935 HEMODIALYSIS ONE EVALUATION: CPT

## 2021-11-03 PROCEDURE — 97530 THERAPEUTIC ACTIVITIES: CPT

## 2021-11-03 PROCEDURE — 5A1D70Z PERFORMANCE OF URINARY FILTRATION, INTERMITTENT, LESS THAN 6 HOURS PER DAY: ICD-10-PCS | Performed by: INTERNAL MEDICINE

## 2021-11-03 PROCEDURE — 6370000000 HC RX 637 (ALT 250 FOR IP): Performed by: INTERNAL MEDICINE

## 2021-11-03 PROCEDURE — 36415 COLL VENOUS BLD VENIPUNCTURE: CPT

## 2021-11-03 PROCEDURE — 97161 PT EVAL LOW COMPLEX 20 MIN: CPT

## 2021-11-03 PROCEDURE — 2580000003 HC RX 258: Performed by: INTERNAL MEDICINE

## 2021-11-03 PROCEDURE — 84484 ASSAY OF TROPONIN QUANT: CPT

## 2021-11-03 PROCEDURE — 94761 N-INVAS EAR/PLS OXIMETRY MLT: CPT

## 2021-11-03 PROCEDURE — 97116 GAIT TRAINING THERAPY: CPT

## 2021-11-03 PROCEDURE — 94640 AIRWAY INHALATION TREATMENT: CPT

## 2021-11-03 PROCEDURE — 6360000002 HC RX W HCPCS: Performed by: INTERNAL MEDICINE

## 2021-11-03 RX ORDER — 0.9 % SODIUM CHLORIDE 0.9 %
500 INTRAVENOUS SOLUTION INTRAVENOUS PRN
Status: DISCONTINUED | OUTPATIENT
Start: 2021-11-03 | End: 2021-11-05 | Stop reason: HOSPADM

## 2021-11-03 RX ORDER — INSULIN LISPRO 100 [IU]/ML
0-12 INJECTION, SOLUTION INTRAVENOUS; SUBCUTANEOUS
Status: DISCONTINUED | OUTPATIENT
Start: 2021-11-03 | End: 2021-11-05 | Stop reason: HOSPADM

## 2021-11-03 RX ORDER — ASPIRIN 81 MG/1
81 TABLET ORAL DAILY
Status: DISCONTINUED | OUTPATIENT
Start: 2021-11-03 | End: 2021-11-05 | Stop reason: HOSPADM

## 2021-11-03 RX ORDER — LORAZEPAM 0.5 MG/1
0.5 TABLET ORAL EVERY 8 HOURS PRN
Status: DISCONTINUED | OUTPATIENT
Start: 2021-11-03 | End: 2021-11-05 | Stop reason: HOSPADM

## 2021-11-03 RX ORDER — ALBUTEROL SULFATE 90 UG/1
2 AEROSOL, METERED RESPIRATORY (INHALATION) 4 TIMES DAILY
Status: DISCONTINUED | OUTPATIENT
Start: 2021-11-03 | End: 2021-11-05 | Stop reason: HOSPADM

## 2021-11-03 RX ORDER — SODIUM CHLORIDE 9 MG/ML
25 INJECTION, SOLUTION INTRAVENOUS PRN
Status: DISCONTINUED | OUTPATIENT
Start: 2021-11-03 | End: 2021-11-05 | Stop reason: HOSPADM

## 2021-11-03 RX ORDER — SODIUM CHLORIDE 0.9 % (FLUSH) 0.9 %
10 SYRINGE (ML) INJECTION PRN
Status: DISCONTINUED | OUTPATIENT
Start: 2021-11-03 | End: 2021-11-05 | Stop reason: HOSPADM

## 2021-11-03 RX ORDER — HEPARIN SODIUM 5000 [USP'U]/ML
5000 INJECTION, SOLUTION INTRAVENOUS; SUBCUTANEOUS EVERY 8 HOURS SCHEDULED
Status: DISCONTINUED | OUTPATIENT
Start: 2021-11-03 | End: 2021-11-05 | Stop reason: HOSPADM

## 2021-11-03 RX ORDER — ATORVASTATIN CALCIUM 40 MG/1
40 TABLET, FILM COATED ORAL NIGHTLY
Status: DISCONTINUED | OUTPATIENT
Start: 2021-11-03 | End: 2021-11-05 | Stop reason: HOSPADM

## 2021-11-03 RX ORDER — CLONIDINE 0.2 MG/24H
1 PATCH, EXTENDED RELEASE TRANSDERMAL WEEKLY
Status: DISCONTINUED | OUTPATIENT
Start: 2021-11-08 | End: 2021-11-05 | Stop reason: HOSPADM

## 2021-11-03 RX ORDER — ALBUTEROL SULFATE 90 UG/1
2 AEROSOL, METERED RESPIRATORY (INHALATION) EVERY 4 HOURS PRN
Status: DISCONTINUED | OUTPATIENT
Start: 2021-11-03 | End: 2021-11-05 | Stop reason: HOSPADM

## 2021-11-03 RX ORDER — ONDANSETRON 4 MG/1
4 TABLET, ORALLY DISINTEGRATING ORAL EVERY 8 HOURS PRN
Status: DISCONTINUED | OUTPATIENT
Start: 2021-11-03 | End: 2021-11-05 | Stop reason: HOSPADM

## 2021-11-03 RX ORDER — DEXTROSE MONOHYDRATE 25 G/50ML
12.5 INJECTION, SOLUTION INTRAVENOUS PRN
Status: DISCONTINUED | OUTPATIENT
Start: 2021-11-03 | End: 2021-11-05 | Stop reason: HOSPADM

## 2021-11-03 RX ORDER — NICOTINE POLACRILEX 4 MG
15 LOZENGE BUCCAL PRN
Status: DISCONTINUED | OUTPATIENT
Start: 2021-11-03 | End: 2021-11-05 | Stop reason: HOSPADM

## 2021-11-03 RX ORDER — POTASSIUM CHLORIDE 20 MEQ/1
40 TABLET, EXTENDED RELEASE ORAL PRN
Status: DISCONTINUED | OUTPATIENT
Start: 2021-11-03 | End: 2021-11-03

## 2021-11-03 RX ORDER — PREDNISONE 20 MG/1
40 TABLET ORAL DAILY
Status: DISCONTINUED | OUTPATIENT
Start: 2021-11-03 | End: 2021-11-05 | Stop reason: HOSPADM

## 2021-11-03 RX ORDER — INSULIN LISPRO 100 [IU]/ML
0-6 INJECTION, SOLUTION INTRAVENOUS; SUBCUTANEOUS NIGHTLY
Status: DISCONTINUED | OUTPATIENT
Start: 2021-11-03 | End: 2021-11-05 | Stop reason: HOSPADM

## 2021-11-03 RX ORDER — ACETAMINOPHEN 325 MG/1
650 TABLET ORAL EVERY 6 HOURS PRN
Status: DISCONTINUED | OUTPATIENT
Start: 2021-11-03 | End: 2021-11-05 | Stop reason: HOSPADM

## 2021-11-03 RX ORDER — PREGABALIN 75 MG/1
75 CAPSULE ORAL NIGHTLY
Status: DISCONTINUED | OUTPATIENT
Start: 2021-11-03 | End: 2021-11-05 | Stop reason: HOSPADM

## 2021-11-03 RX ORDER — HEPARIN SODIUM 1000 [USP'U]/ML
3600 INJECTION, SOLUTION INTRAVENOUS; SUBCUTANEOUS PRN
Status: DISCONTINUED | OUTPATIENT
Start: 2021-11-03 | End: 2021-11-05 | Stop reason: HOSPADM

## 2021-11-03 RX ORDER — SODIUM CHLORIDE 0.9 % (FLUSH) 0.9 %
10 SYRINGE (ML) INJECTION EVERY 12 HOURS SCHEDULED
Status: DISCONTINUED | OUTPATIENT
Start: 2021-11-03 | End: 2021-11-05 | Stop reason: HOSPADM

## 2021-11-03 RX ORDER — SPIRONOLACTONE 25 MG/1
50 TABLET ORAL DAILY
Status: DISCONTINUED | OUTPATIENT
Start: 2021-11-03 | End: 2021-11-05 | Stop reason: HOSPADM

## 2021-11-03 RX ORDER — POTASSIUM CHLORIDE 7.45 MG/ML
10 INJECTION INTRAVENOUS PRN
Status: DISCONTINUED | OUTPATIENT
Start: 2021-11-03 | End: 2021-11-03

## 2021-11-03 RX ORDER — DEXTROSE MONOHYDRATE 50 MG/ML
100 INJECTION, SOLUTION INTRAVENOUS PRN
Status: DISCONTINUED | OUTPATIENT
Start: 2021-11-03 | End: 2021-11-05 | Stop reason: HOSPADM

## 2021-11-03 RX ORDER — FLUVOXAMINE MALEATE 50 MG/1
50 TABLET, COATED ORAL NIGHTLY
Status: DISCONTINUED | OUTPATIENT
Start: 2021-11-03 | End: 2021-11-05 | Stop reason: HOSPADM

## 2021-11-03 RX ORDER — CLONAZEPAM 1 MG/1
0.5 TABLET ORAL 2 TIMES DAILY
Status: DISCONTINUED | OUTPATIENT
Start: 2021-11-03 | End: 2021-11-05 | Stop reason: HOSPADM

## 2021-11-03 RX ORDER — ONDANSETRON 2 MG/ML
4 INJECTION INTRAMUSCULAR; INTRAVENOUS EVERY 6 HOURS PRN
Status: DISCONTINUED | OUTPATIENT
Start: 2021-11-03 | End: 2021-11-05 | Stop reason: HOSPADM

## 2021-11-03 RX ORDER — ACETAMINOPHEN 650 MG/1
650 SUPPOSITORY RECTAL EVERY 6 HOURS PRN
Status: DISCONTINUED | OUTPATIENT
Start: 2021-11-03 | End: 2021-11-05 | Stop reason: HOSPADM

## 2021-11-03 RX ORDER — INSULIN LISPRO 100 [IU]/ML
0-6 INJECTION, SOLUTION INTRAVENOUS; SUBCUTANEOUS
Status: DISCONTINUED | OUTPATIENT
Start: 2021-11-03 | End: 2021-11-03 | Stop reason: ALTCHOICE

## 2021-11-03 RX ORDER — HYDRALAZINE HYDROCHLORIDE 20 MG/ML
10 INJECTION INTRAMUSCULAR; INTRAVENOUS EVERY 6 HOURS PRN
Status: DISCONTINUED | OUTPATIENT
Start: 2021-11-03 | End: 2021-11-05 | Stop reason: HOSPADM

## 2021-11-03 RX ORDER — BENZONATATE 100 MG/1
200 CAPSULE ORAL EVERY 8 HOURS PRN
Status: DISCONTINUED | OUTPATIENT
Start: 2021-11-03 | End: 2021-11-05 | Stop reason: HOSPADM

## 2021-11-03 RX ORDER — ALBUTEROL SULFATE 90 UG/1
2 AEROSOL, METERED RESPIRATORY (INHALATION) EVERY 6 HOURS PRN
Status: DISCONTINUED | OUTPATIENT
Start: 2021-11-03 | End: 2021-11-03

## 2021-11-03 RX ORDER — LISINOPRIL 20 MG/1
40 TABLET ORAL DAILY
Status: DISCONTINUED | OUTPATIENT
Start: 2021-11-03 | End: 2021-11-05 | Stop reason: HOSPADM

## 2021-11-03 ASSESSMENT — PAIN SCALES - GENERAL
PAINLEVEL_OUTOF10: 0

## 2021-11-03 NOTE — ED NOTES
Pt. Placed on o2 via cannula @ 2L D/T sats dropping below 90% while sleeping. Pt. States that o2 drops at rest and denies using any o2 at home. Pt.  Shows no s/s of distress breathing easy and unlabored     Silvia Haynes RN  11/02/21 6832

## 2021-11-03 NOTE — CONSULTS
MD Sagrario Pedersen MD Donita Rower, MD                                  Office: [phone]                 Fax: [phone]          [facility]                     NEPHROLOGY CONSULTATION NOTE:     PATIENT NAME: Lore Flanagan  : 1975  MRN: [id number]      Name:  Lore Flanagan Date/Time of Admission: 2021  7:59 PM    CSN: [id number] Attending Provider: Carmina Garcia MD   Room/Bed: 4JX-3150/4030-19 : 1975 Age: 55 y.o. Reason for Nephrology consult. Evaluation of patient with ESRD  dependent on dialysis, admitted with generalized weakness and worsening shortness of breath. History of Presenting complaint. Lisa Ramirez,is 55 y.o. of age,  And is known to have ESRD dependent on dialysis. Patient has regular hemodialysis treatment on . At hemodialysis unit. Patient has indwelling tunneled dialysis catheter. last hemodialysis treatment was  2 days ago. Complains of shortness of breath. Afebrile. Noncompliant with dialysis treatments. Multiple hospital admissions. Tunneled hemodialysis catheter. Complains of edema. Patient reports several of her depression medications was recently changed  Medications reviewed. Medical records reviewed    Past Medical History.     Past Medical History:   Diagnosis Date    Blind in both eyes     Cerebral artery occlusion with cerebral infarction ([address]     CHF (congestive heart failure) (HCC)     Chronic kidney disease     Depression     Diabetes mellitus out of control ([address]     Diabetes mellitus, type II ([address]         Diabetic neuropathy associated with type 2 diabetes mellitus ([address]     Generalized headaches     Hypertension     Infertility     Insomnia     chronic vs lack of time spent to sleep    Migraine headache 2011    Mixed hyperlipidemia     Otitis media     h/o recurrent    Pelvic abscess in female 10/05/2013    Pneumonia      approx.  Stroke ([facility] 08/27/2020    Stroke ([facility] 08/27/2021       Medications  Which i have  Reviewed, also have reviewed home medications  Prior to Admission medications    Medication Sig Start Date End Date Taking? Authorizing Provider   albuterol sulfate  (90 Base) MCG/ACT inhaler Inhale 2 puffs into the lungs every 6 hours as needed for Wheezing or Shortness of Breath 11/1/21  Yes Damon Mireles   pregabalin (LYRICA) 75 MG capsule Take 1 capsule by mouth nightly for 30 days. 11/1/21 12/1/21 Yes Damon Mireles   cloNIDine (CATAPRES) 0.2 MG/24HR PTWK Place 1 patch onto the skin once a week 11/1/21  Yes Damon Mireles   fluvoxaMINE (LUVOX) 50 MG tablet Take 1 tablet by mouth nightly 11/1/21 12/1/21 Yes Damon Mireles   ALPRAZolam (XANAX XR) 2 MG extended release tablet Take 1 tablet by mouth every morning for 30 days.  11/1/21 12/1/21 Yes Damon Mireles   benzonatate (TESSALON) 200 MG capsule Take 1 capsule by mouth every 8 hours as needed for Cough 11/1/21  Yes Damon Mireles   predniSONE (DELTASONE) 20 MG tablet Take 2 tablets by mouth daily for 5 days 11/1/21 11/6/21 Yes Damon Mireles   albuterol sulfate HFA (PROVENTIL HFA) 108 (90 Base) MCG/ACT inhaler Inhale 2 puffs into the lungs every 4 hours as needed for Wheezing 10/21/21  Yes Quinn Leiva MD   insulin glargine (LANTUS SOLOSTAR) 100 UNIT/ML injection pen Inject 20 Units into the skin every morning    Yes Historical Provider, MD   lisinopril (PRINIVIL;ZESTRIL) 40 MG tablet Take 40 mg by mouth daily   Yes Historical Provider, MD   glycopyrrolate-formoterol (BEVESPI AEROSPHERE) 9-4.8 MCG/ACT AERO Inhale 2 puffs into the lungs 2 times daily 9/23/21  Yes Damon Mireles   glucose (GLUTOSE) 40 % GEL Take 37.5 mLs by mouth as needed (low blood sugar) 9/13/21  Yes Yoshi Brumfield MD   Dulaglutide 0.75 MG/0.5ML SOPN Inject 0.75 mg into the skin once a week 7/8/21  Yes Damon Mireles   spironolactone (ALDACTONE) 50 MG tablet Take 1 tablet by mouth daily 7/8/21  Yes Damon Mireles   atorvastatin (LIPITOR) 40 MG tablet Take 1 tablet by mouth nightly 7/8/21  Yes Damon Mireles   aspirin 81 MG EC tablet Take 1 tablet by mouth daily 9/1/20  Yes Babatunde Garcia MD   insulin lispro, 1 Unit Dial, 100 UNIT/ML SOPN Inject 0-6 Units into the skin 3 times daily (with meals) **Corrective Low Dose Algorithm**  Glucose: Dose:               No Insulin  140-199 1 Unit  200-249 2 Units  250-299 3 Units  300-349 4 Units  350-399 5 Units  Over 399 6 Units 4/29/20  Yes Fede Mancuso MD   Misc.  Devices (PULSE OXIMETER) MISC 1 each by Does not apply route every 6-8 hours as needed (shortness of breath) 11/1/21   Damon Mireles   Nasal Dilators (BREATHE RIGHT EXTRA STRENGTH) STRP 1 strip by Does not apply route nightly as needed (nasal congestion) 11/1/21   Damon Mireles   Insulin Pen Needle 29G X 12.7MM MISC 1 each by Does not apply route daily 7/8/21   Kristofer Shock     Current Facility-Administered Medications: aspirin EC tablet 81 mg, 81 mg, Oral, Daily  [START ON 11/5/2021] Dulaglutide SOPN 0.75 mg, 0.75 mg, SubCUTAneous, Weekly  spironolactone (ALDACTONE) tablet 50 mg, 50 mg, Oral, Daily  atorvastatin (LIPITOR) tablet 40 mg, 40 mg, Oral, Nightly  glucose (GLUTOSE) 40 % oral gel 15 g, 15 g, Oral, PRN  clonazePAM (KLONOPIN) tablet 0.5 mg, 0.5 mg, Oral, BID  tiotropium-olodaterol (STIOLTO) 2.5-2.5 MCG/ACT inhaler 2 puff, 2 puff, Inhalation, Daily  insulin glargine (LANTUS;BASAGLAR) injection pen 20 Units, 20 Units, SubCUTAneous, QAM  lisinopril (PRINIVIL;ZESTRIL) tablet 40 mg, 40 mg, Oral, Daily  LORazepam (ATIVAN) tablet 0.5 mg, 0.5 mg, Oral, Q8H PRN  albuterol sulfate  (90 Base) MCG/ACT inhaler 2 puff, 2 puff, Inhalation, Q4H PRN  pregabalin (LYRICA) capsule 75 mg, 75 mg, Oral, Nightly  [START ON 11/8/2021] cloNIDine (CATAPRES) 0.2 MG/24HR 1 patch, 1 patch, TransDERmal, Weekly  fluvoxaMINE (LUVOX) tablet 50 mg, 50 mg, Oral, Nightly  benzonatate (TESSALON) capsule 200 mg, 200 mg, Oral, Q8H PRN  predniSONE (DELTASONE) tablet 40 mg, 40 mg, Oral, Daily  sodium chloride flush 0.9 % injection 10 mL, 10 mL, IntraVENous, 2 times per day  sodium chloride flush 0.9 % injection 10 mL, 10 mL, IntraVENous, PRN  0.9 % sodium chloride infusion, 25 mL, IntraVENous, PRN  ondansetron (ZOFRAN-ODT) disintegrating tablet 4 mg, 4 mg, Oral, Q8H PRN **OR** ondansetron (ZOFRAN) injection 4 mg, 4 mg, IntraVENous, Q6H PRN  acetaminophen (TYLENOL) tablet 650 mg, 650 mg, Oral, Q6H PRN **OR** acetaminophen (TYLENOL) suppository 650 mg, 650 mg, Rectal, Q6H PRN  hydrALAZINE (APRESOLINE) injection 10 mg, 10 mg, IntraVENous, Q6H PRN  0.9 % sodium chloride bolus, 500 mL, IntraVENous, PRN  insulin lispro (1 Unit Dial) 0-12 Units, 0-12 Units, SubCUTAneous, TID WC  insulin lispro (1 Unit Dial) 0-6 Units, 0-6 Units, SubCUTAneous, Nightly  glucose (GLUTOSE) 40 % oral gel 15 g, 15 g, Oral, PRN  dextrose 50 % IV solution, 12.5 g, IntraVENous, PRN  glucagon (rDNA) injection 1 mg, 1 mg, IntraMUSCular, PRN  dextrose 5 % solution, 100 mL/hr, IntraVENous, PRN  heparin (porcine) injection 5,000 Units, 5,000 Units, SubCUTAneous, 3 times per day  albuterol sulfate  (90 Base) MCG/ACT inhaler 2 puff, 2 puff, Inhalation, 4x daily   sodium chloride      dextrose           Allergies. Allergies   Allergen Reactions    Amoxicillin Hives, Itching and Other (See Comments)     Tolerates cephalosporins  Patient tolerating cefazolin (ANCEF) as of October 11, 2018      Levofloxacin Anaphylaxis    Vancomycin Anaphylaxis, Hives and Shortness Of Breath    Tape [Adhesive Tape] Other (See Comments)     Paper tape turns skin bright red. Plastic tape okay. Social History.   Social History     Socioeconomic History    Marital status:      Spouse name: Joe Peer Number of children: 0    Years of education: Not on file    Highest education level: Not on file   Occupational History    Occupation: works as    Tobacco Use    Smoking status: Former Smoker     Packs/day: 1.00     Types: Cigarettes    Smokeless tobacco: Never Used    Tobacco comment: Quit in August 2021   Vaping Use    Vaping Use: Never used   Substance and Sexual Activity    Alcohol use: No     Comment: Very Rare    Drug use: No    Sexual activity: Yes     Partners: Male   Other Topics Concern    Not on file   Social History Narrative    Not on file     Social Determinants of Health     Financial Resource Strain:     Difficulty of Paying Living Expenses:    Food Insecurity:     Worried About Running Out of Food in the Last Year:     [address] in the Last Year:    Transportation Needs:     Lack of Transportation (Medical):  Lack of Transportation (Non-Medical):    Physical Activity:     Days of Exercise per Week:     Minutes of Exercise per Session:    Stress:     Feeling of Stress :    Social Connections:     Frequency of Communication with Friends and Family:     Frequency of Social Gatherings with Friends and Family:     Attends [employer] Services:     Active Member of Clubs or Organizations:     Attends Club or Organization Meetings:     Marital Status:    Intimate Partner Violence:     Fear of Current or Ex-Partner:     Emotionally Abused:     Physically Abused:     Sexually Abused:        Family History    Family History   Problem Relation Age of Onset    Diabetes Mother    [employer] Other Mother 79        Covid    Diabetes Father     High Blood Pressure Father     Colon Cancer Father     Diabetes Sister     Alcohol Abuse Maternal Grandfather     Diabetes Paternal Grandmother     Alcohol Abuse Paternal Grandfather     Diabetes Paternal Aunt     Diabetes Paternal Uncle        Review of Systems. Complains of generalized weakness   No systemic complaints of fever, weight loss or night sweat. System Review  Of  Eyes/ HEN MT  ARE negative. Cardiac.   No chest pain or palpitations. RS. Increasing shortness of breath on minimal exertion. No wheeze. PA No abdominal pain or diarrhea  : Decreased urine output . No hematuria.  CNS. No headache or limb weakness. Psy:  No features of anxiety or depression  Skin:  No rash or nodules  Endocrine:  history of diabetes . Hematology:  history of anemia. Rest of the 10 system review is normal      PHYSICAL EXAMINATION. /73   Pulse 93   Temp 97.6 °F (36.4 °C) (Oral)   Resp 15   Ht 5' 7\" (1.702 m)   Wt 184 lb 9.6 oz (83.7 kg)   LMP 10/14/2021   SpO2 99%   BMI 28.91 kg/m²   No intake or output data in the 24 hours ending 11/03/21 1132    INTAKE/OUTPUT:  No intake/output data recorded. No intake/output data recorded. Vitals:    11/03/21 1030   BP: 133/73   Pulse: 93   Resp: 15   Temp:    SpO2:      No intake or output data in the 24 hours ending 11/03/21 1132     Ill Appearing. Moderate respiratory distress. Awake alert  no hypotension  External exam of the ears and nose are normal  HENT: exam is normal  Eyes: Pupils are equal, round, and reactive to light. Lymph Nodes. No axillary or cervical lymph nodes are palpable. Neck. JVD not visible. No lymph nodes palpable. CVS.  Heart sounds are normal.Palpation of the heart is normal. No murmurs. No pericardial rub.  RS.dullness on percussion of the lower chest wall. Bilateral Basal rales. PA soft , bowel sounds are normal no distension and no tenderness to palpation. Skin No rash , No palpable nodules  Musculoskeletal: Normal range of motion. 1+ edema and no tenderness. CNS  No focal    Edema Worse  More awake and alert.    All pulses are well felt      Labs reviewed by me       CBC:   Recent Labs     11/02/21 2035   WBC 17.7*   HGB 10.8*   HCT 35.0*   MCV 88.1        BMP:   Recent Labs     11/02/21 2035 11/03/21  0551    135*   K 4.4 5.1  5.1   CL 97* 96*   CO2 31 28   BUN 34* 38*   CREATININE 3.8* 4.4*     Magnesium:   Lab Results Component Value Date    MG 1.80 09/13/2021    MG 1.90 09/12/2021    MG 1.90 09/11/2021           ASSESSMENT AND PLAN    ESRD on hemodialysis. Fluid overload. Shortness of breath. Noncompliance. Type 2 diabetes. History of chronic systolic congestive heart failure. Anemia. History of uncontrolled hypertension. PLAN     Patient will have urgent hemodialysis treatment today. Hypertension levels uncontrolled needs improvement and medication changed. Fluid Overload. Edema  and fluid overload is worse- need extra fluid removal during dialysis. Renal functions and electrolytes need close monitoring. Repeat chest xray in the morning. Electrolytes reviewed. Potassium levels have improved. Continue on  low potassium and renal diet. Magnesium levels reviewedand are stable. Medications reviewed and appropriate for level of kidney function. All Nephrotoxic medications have been discontinued. Antibiotic doses are appropriate for level of renal function. Not on antibiotics  Anemia levels are stable and need monitoring. Continue on Iron. Repeat Hb. is   Deconditioned and  Nutritional status needs to be improved. CBC    CXR  daily weights, strict I/O's, low sodium diet (i.e., 2 gm), renal diet and 1.2 L fluid restriction  Discussed with treating team. and  Discussed with RN   Multiple complex problems. and   Time spent 35 mins. Thanks for the consult . Electronically Signed:  Amber Araujo MD 11/3/2021    Arterial Blood Gasses  No results for input(s): PH, PCO2, PO2 in the last 72 hours.     Invalid input(s): [id number], INSPIREDO2    UA:  Recent Labs     11/02/21 2102   COLORU YELLOW   PHUR 7.0   WBCUA None seen   RBCUA 0-2   CLARITYU Clear   SPECGRAV 1.012   LEUKOCYTESUR Negative   UROBILINOGEN 0.2   BILIRUBINUR Negative   BLOODU TRACE*   GLUCOSEU 100*       LIVER PROFILE:   Recent Labs     11/02/21 2035 11/03/21  0551   AST 14* 19   ALT 11 10   LIPASE 18.0  --    BILITOT 0.3 0.3   ALKPHOS 130* 125     PT/INR:    Lab Results   Component Value Date    PROTIME 11.3 09/07/2021    PROTIME 11.2 08/27/2021    PROTIME 10.6 08/27/2020    INR 1.00 09/07/2021    INR 0.99 08/27/2021    INR 0.91 08/27/2020     PTT:    Lab Results   Component Value Date    APTT 32.0 09/13/2021    APTT 29.1 09/12/2021    APTT 31.3 09/11/2021     NILSA:    Lab Results   Component Value Date    NILSA Negative 04/25/2020           RADIOLOGY:    XR NECK SOFT TISSUE    Result Date: 10/20/2021  EXAMINATION: TWO XRAY VIEWS OF THE NECK SOFT TISSUES 10/20/2021 10:35 am COMPARISON: 10/16/2021 HISTORY: ORDERING SYSTEM PROVIDED HISTORY: stridor TECHNOLOGIST PROVIDED HISTORY: Reason for exam:->stridor Reason for Exam: Shortness of Breath (in by EMS from home was d/c yesterday after being inpatient for 3 days, patient states it was related to sob from missing dialysis, patient reporting she has a severe panic disorder and when she cant breath she panics and it causes this response ) Acuity: Acute Type of Exam: Initial FINDINGS: Epiglottis again appears normal.  No foreign body. No significant soft tissue swelling. Mild narrowing of the glottis again noted. Mild narrowing the glottis again noted Otherwise, unremarkable soft tissues of the neck     XR NECK SOFT TISSUE    Result Date: 10/16/2021  EXAMINATION: TWO XRAY VIEWS OF THE NECK SOFT TISSUES 10/16/2021 10:32 pm COMPARISON: None. HISTORY: ORDERING SYSTEM PROVIDED HISTORY: stridor TECHNOLOGIST PROVIDED HISTORY: Reason for exam:->stridor FINDINGS: The upper airway is patent. The retropharyngeal soft tissues and epiglottis are not thickened. There is narrowing at the glottis. No radiopaque foreign bodies are seen over the airway. Retropharyngeal soft tissues and epiglottis appear normal.  There is some narrowing at the glottis.      CT SOFT TISSUE NECK W CONTRAST    Result Date: 10/20/2021  EXAMINATION: CT OF THE NECK SOFT TISSUE WITH CONTRAST  10/20/2021 TECHNIQUE: CT of the neck was performed with the administration of intravenous contrast. Multiplanar reformatted images are provided for review. Dose modulation, iterative reconstruction, and/or weight based adjustment of the mA/kV was utilized to reduce the radiation dose to as low as reasonably achievable. COMPARISON: CTA head and neck 08/27/2020 HISTORY: ORDERING SYSTEM PROVIDED HISTORY: epiglottitis TECHNOLOGIST PROVIDED HISTORY: Reason for exam:->epiglottitis Decision Support Exception - unselect if not a suspected or confirmed emergency medical condition->Emergency Medical Condition (MA) Reason for Exam: headache Acuity: Unknown Type of Exam: Unknown FINDINGS: PHARYNX/LARYNX: Nasopharynx and oropharynx appear normal.Parapharyngeal tissue planes are preserved. Oral cavity and floor of mouth appear normal within constraints of artifact from dental amalgam. spaces appear normal.The hypopharynx and infraglottic trachea are unremarkable. There is asymmetric thickening of the right aryepiglottic fold. There is a suspected vocal cord polyp protruding into the larynx (series 2, image 64 and series 602, image 77). Imaged upper esophagus is unremarkable. SALIVARY GLANDS/THYROID:The parotid and submandibular glands appear unremarkable. Again seen is a multinodular thyroid with a dominant 16 mm left thyroid lobe nodule. LYMPH NODES: No cervical or supraclavicular lymphadenopathy is seen. SOFT TISSUES: No appreciable soft tissue swelling. No mass within carotid spaces. Patent extracranial carotid systems and internal jugular veins bilaterally. BRAIN/ORBITS/SINUSES: No abnormal intracranial enhancement, mass effect, or hydrocephalus within the imaged brain. Orbital soft tissue planes are preserved. Imaged paranasal sinuses are clear. Mastoid air cells and middle ear cavities are clear. LUNG APICES/SUPERIOR MEDIASTINUM:Imaged lung apices are clear of focal consolidation or mass. Partially imaged right IJ central venous catheter.  BONES:  No aggressive appearing lytic or blastic bony lesion. No significant spondylotic changes in the visualized spine. 1. Asymmetric thickening of the right aryepiglottic fold may reflect underlying edema and supraglottitis, however underlying mass not excluded. In addition, there is a suspected vocal cord polyp protruding into the larynx. Direct visual inspection is recommended. 2. No cervical lymphadenopathy. 3. Multinodular thyroid gland again noted with a dominant left 16 mm nodule. Nonemergent thyroid ultrasound should be considered. XR CHEST PORTABLE    Result Date: 11/2/2021  EXAMINATION: ONE XRAY VIEW OF THE CHEST 11/2/2021 8:29 pm COMPARISON: 10/20/2021 HISTORY: ORDERING SYSTEM PROVIDED HISTORY: SOB TECHNOLOGIST PROVIDED HISTORY: Reason for exam:->SOB Reason for Exam: sob Acuity: Unknown Type of Exam: Unknown FINDINGS: Right jugular tunneled venous catheter terminates over the right atrium. Cardiac silhouette is mildly enlarged. Pulmonary vessels are normal in caliber. There is no focal airspace disease, pleural effusion or pneumothorax. No acute cardiopulmonary disease. Stable enlargement of the cardiac silhouette. XR CHEST PORTABLE    Result Date: 10/20/2021  EXAMINATION: ONE XRAY VIEW OF THE CHEST 10/20/2021 10:35 am COMPARISON: 10/16/2021 HISTORY: ORDERING SYSTEM PROVIDED HISTORY: sob TECHNOLOGIST PROVIDED HISTORY: Reason for exam:->sob Reason for Exam: Shortness of Breath (in by EMS from home was d/c yesterday after being inpatient for 3 days, patient states it was related to sob from missing dialysis, patient reporting she has a severe panic disorder and when she cant breath she panics and it causes this response ) Acuity: Acute Type of Exam: Initial FINDINGS: Right-sided central venous catheter remains in place. The lungs are without acute focal process. There is no effusion or pneumothorax. The cardiomediastinal silhouette is stable. The osseous structures are stable.      No acute process. Stable cardiomegaly     XR CHEST PORTABLE    Result Date: 10/16/2021  EXAMINATION: ONE XRAY VIEW OF THE CHEST 10/16/2021 7:06 pm COMPARISON: None. HISTORY: ORDERING SYSTEM PROVIDED HISTORY: SOB TECHNOLOGIST PROVIDED HISTORY: Reason for exam:->SOB Reason for Exam: SOB Acuity: Acute Type of Exam: Initial FINDINGS: There is a right subclavian catheter with its tip in the right atrium. The cardiomediastinal silhouette is mildly enlarged. There is mild pulmonary vascular congestion. There is no pleural effusion. There is no pneumothorax. There is no acute osseous abnormality. Mild pulmonary vascular congestion. Mild cardiomegaly. XR CHEST PORTABLE    Result Date: 10/6/2021  EXAMINATION: ONE XRAY VIEW OF THE CHEST 10/4/2021 2:33 pm COMPARISON: 10/03/2021 HISTORY: ORDERING SYSTEM PROVIDED HISTORY: SOB TECHNOLOGIST PROVIDED HISTORY: Reason for exam:->SOB Reason for Exam: Shortness of breath. Acuity: Acute Type of Exam: Initial FINDINGS: Worsening multifocal airspace consolidation in both lungs. Findings suggest worsening pneumonia. Removal of ET tube since prior. A right central line with tips in the SVC. No pneumothorax. No pleural effusion. Cardiomediastinal silhouette and bony thorax are unchanged. Extensive multifocal airspace disease in both lungs worsening since prior examination suggesting worsening pneumonia. CT CHEST PULMONARY EMBOLISM W CONTRAST    Result Date: 10/16/2021  EXAMINATION: CTA OF THE CHEST 10/16/2021 8:43 pm TECHNIQUE: CTA of the chest was performed after the administration of intravenous contrast.  Multiplanar reformatted images are provided for review. MIP images are provided for review. Dose modulation, iterative reconstruction, and/or weight based adjustment of the mA/kV was utilized to reduce the radiation dose to as low as reasonably achievable. COMPARISON: None.  HISTORY: ORDERING SYSTEM PROVIDED HISTORY: pe- dialysis patient - okay to contrast TECHNOLOGIST PROVIDED HISTORY: Reason for exam:->pe- dialysis patient - okay to contrast Decision Support Exception - unselect if not a suspected or confirmed emergency medical condition->Emergency Medical Condition (MA) Reason for Exam: Shortness of Breath (SOB with audbile wheezes for over one week. Denies pulmonary hx or exposure to recent illness; quit smoking 8 weeks ago & has anxiety. Reports was in the ICU for unknown reason & was placed on vent in August 2021; seen on 10/4 & needed blood transfusion.  ) FINDINGS: Pulmonary Arteries: Pulmonary arteries are adequately opacified for evaluation. No evidence of intraluminal filling defect to suggest pulmonary embolism. Main pulmonary artery is normal in caliber. Mediastinum: The heart is enlarged. The thoracic aorta and proximal great vessels are patent and of normal caliber. No pericardial effusion. No enlarged or suspicious axillary, hilar, or mediastinal lymphadenopathy. The thyroid gland is mildly enlarged and somewhat heterogeneous. The right jugular vein hemodialysis catheter ends in the upper right atrium. Lungs/pleura: The trachea and mainstem bronchi are widely patent. There is interlobular septal thickening with diffuse bilateral lower lobe airspace disease and ground-glass opacity. No cavitary lesions. No discrete pulmonary nodule or mass. Moderate right and small left pleural effusions. No pericardial effusion. No pneumothorax. Soft Tissues/Bones: Degenerative changes are present throughout the thoracic spine. Upper Abdomen: The visualized upper abdomen is unremarkable. No evidence of pulmonary embolism or acute thoracic aortic abnormality. Cardiomegaly with moderate pulmonary vascular congestive change, moderate right, and small left pleural effusions. Minimal superimposed bibasilar atelectasis. No significant pericardial effusion. Imaging Results.   Chest X Ray reviwed by me          Electronically Signed:  Carlo Engel MD 11/3/2021

## 2021-11-03 NOTE — RT PROTOCOL NOTE
RT Inhaler-Nebulizer Bronchodilator Protocol Note    There is a bronchodilator order in the chart from a provider indicating to follow the RT Bronchodilator Protocol and there is an Initiate RT Inhaler-Nebulizer Bronchodilator Protocol order as well (see protocol at bottom of note). CXR Findings:  XR CHEST PORTABLE    Result Date: 11/2/2021  No acute cardiopulmonary disease. Stable enlargement of the cardiac silhouette. The findings from the last RT Protocol Assessment were as follows:   History Pulmonary Disease: None or smoker <15 pack years  Respiratory Pattern: Use of accessory muscles, prolonged exhalation, or RR 26-30 bpm  Breath Sounds: Inspiratory and expiratory or bilateral wheezing and/or rhonchi  Cough: Strong, spontaneous, non-productive  Indication for Bronchodilator Therapy: Wheezing associated with pulm disorder  Bronchodilator Assessment Score: 12    Aerosolized bronchodilator medication orders have been revised according to the RT Inhaler-Nebulizer Bronchodilator Protocol below. Respiratory Therapist to perform RT Therapy Protocol Assessment initially then follow the protocol. Repeat RT Therapy Protocol Assessment PRN for score 0-3 or on second treatment, BID, and PRN for scores above 3. No Indications - adjust the frequency to every 6 hours PRN wheezing or bronchospasm, if no treatments needed after 48 hours then discontinue using Per Protocol order mode. If indication present, adjust the RT bronchodilator orders based on the Bronchodilator Assessment Score as indicated below. Use Inhaler orders unless patient has one or more of the following: on home nebulizer, not able to hold breath for 10 seconds, is not alert and oriented, cannot activate and use MDI correctly, or respiratory rate 25 breaths per minute or more, then use the equivalent nebulizer order(s) with same Frequency and PRN reasons based on the score.   If a patient is on this medication at home then do not decrease Frequency below that used at home. 0-3 - enter or revise RT bronchodilator order(s) to equivalent RT Bronchodilator order with Frequency of every 4 hours PRN for wheezing or increased work of breathing using Per Protocol order mode. 4-6 - enter or revise RT Bronchodilator order(s) to two equivalent RT bronchodilator orders with one order with BID Frequency and one order with Frequency of every 4 hours PRN wheezing or increased work of breathing using Per Protocol order mode. 7-10 - enter or revise RT Bronchodilator order(s) to two equivalent RT bronchodilator orders with one order with TID Frequency and one order with Frequency of every 4 hours PRN wheezing or increased work of breathing using Per Protocol order mode. 11-13 - enter or revise RT Bronchodilator order(s) to one equivalent RT bronchodilator order with QID Frequency and an Albuterol order with Frequency of every 4 hours PRN wheezing or increased work of breathing using Per Protocol order mode. Greater than 13 - enter or revise RT Bronchodilator order(s) to one equivalent RT bronchodilator order with every 4 hours Frequency and an Albuterol order with Frequency of every 2 hours PRN wheezing or increased work of breathing using Per Protocol order mode. RT to enter RT Home Evaluation for COPD & MDI Assessment order using Per Protocol order mode.     Electronically signed by Elisa Heart RCP on 11/3/2021 at 2:27 AM

## 2021-11-03 NOTE — ED NOTES
O2 increased to 4L D/T low o2 sats. No s/s of distress noted.      Estela Brooks, RN  11/02/21 HAYLEE Workman  11/02/21 9401

## 2021-11-03 NOTE — PROGRESS NOTES
11/03/21 0225   RT Protocol   History Pulmonary Disease 0   Respiratory pattern 6   Breath sounds 6   Cough 0   Indications for Bronchodilator Therapy Wheezing associated with pulm disorder   Bronchodilator Assessment Score 12

## 2021-11-03 NOTE — FLOWSHEET NOTE
Patient Response to Treatment  --  see note   Physician Notified?  --  Yes   Treatment time: 3 hours  Net UF: 3000 ml     Pre weight: 86.3 kg standing  Post weight: 83.3 kg  EDW:  83 kg     Access used: [facility] TDC  Access function: Functioned well with  ml/min     Medications or blood products given: none     Regular outpatient schedule: Jovany Mccall 7 MWF     Summary of response to treatment: Tolerated well. Copy of dialysis treatment record placed in chart, to be scanned into EMR.

## 2021-11-03 NOTE — ED PROVIDER NOTES
I independently performed a history and physical on Hood Miranda. All diagnostic, treatment, and disposition decisions were made by myself in conjunction with the advanced practice provider. Briefly, this is a 55 y.o. female here for generalized weakness. Patient reports she is unable to ambulate and has fallen multiple times the day due to feeling weak. On my initial evaluation patient was awake and conversant, oxygen saturations 84% with a good pleth. .    On exam, patient afebrile, chronically ill-appearing however nontoxic. No distress. Heart RRR. Lungs diminished at bases with scattered wheezes. Abdomen soft, nondistended, mildly tender to palpation diffusely without focal right lower quadrant tenderness. No rebound, no rigidity, no guarding. A&Ox4. Face symmetric, speech clear. CN 2-12 intact. 5/5 motor and sensation grossly intact all extremities. Gait not tested. Appears generally weak without focal deficit. EKG  EKG was reviewed by emergency department physician in the absence of a cardiologist    Narrow complex sinus rhythm, rate 81, normal axis, normal IA and QRS intervals, normal Qtc, no ST elevations or depressions, normal t-wave morphology, impression NSR, no STEMI      Screenings  NIH Stroke Scale  Interval: Reassessment  Level of Consciousness (1a. ): Alert  LOC Questions (1b. ):  Answers both correctly  LOC Commands (1c. ): Performs both tasks correctly  Best Gaze (2. ): Normal  Visual (3. ): No visual loss  Facial Palsy (4. ): Normal symmetrical movement  Motor Arm, Left (5a. ): No drift  Motor Arm, Right (5b. ): Drift, but does not hit bed (hx CVA)  Motor Leg, Left (6a. ): No drift  Motor Leg, Right (6b. ): Drift, but does not hit bed (hx CVA)  Limb Ataxia (7. ): Absent  Sensory (8. ): Normal  Best Language (9. ): No aphasia  Dysarthria (10. ): Normal  Extinction and Inattention (11): No abnormality  Total: 2Glasgow Coma Scale  Eye Opening: Spontaneous  Best Verbal Response: Oriented  Best Motor Response: Obeys commands  [place] Coma Scale Score: 15        MDM    Patient afebrile, chronically ill-appearing however nontoxic. At time of my evaluation she is hypoxic however without distress. Her hypoxia was corrected with 4 L by nasal cannula oxygen. Lungs diminished throughout with scattered wheezes, suspect likely COPD exacerbation. VBG confirms hypercapnia with mild acidosis consistent with this diagnosis. Patient received breathing treatments and steroids. Pulmonary embolism considered, however this is not most likely diagnosis. Lower suspicion for infectious etiology, will defer antibiotics. Neuro exam is nonfocal, patient appears generally weak, however presentation not consistent with CVA/TIA. I have concerns about patient's frequent hospital admissions and her ability to properly care for herself at home given her multiple medical comorbidities. Case was discussed with Dr. Yan Knight who will admit for further evaluation and care. At time of admission, patient remained hemodynamically stable, without distress and with corrected hypoxia on nasal cannula oxygen. Critical care:    I have discussed the case with the advanced practice provider. I have personally performed a history, physical exam, and my own medical decision making. I have reviewed the note and agree with the findings and plan. Upon my evaluation, this patient had a high probability of imminent or life-threatening deterioration due to acute hypoxic respiratory failure which required my direct attention, intervention, and personal management. The total critical care time personally spent while evaluating and treating this patient was 34 minutes exclusive of any time spent doing separately billable procedures. This includes time at the bedside, data interpretation, medication management, monitoring for potential decompensation and physician consultation.   Specifics of interventions taken and potentially life-threatening diagnostic considerations are listed above in the medical decision making. Patient Referrals:  No follow-up provider specified. Discharge Medications:  Current Discharge Medication List          FINAL IMPRESSION  1. General weakness    2. ESRD on dialysis ([address]    3. Elevated troponin    4. Acute respiratory failure with hypoxia (HCC)        Blood pressure (!) 142/73, pulse 93, temperature 97.2 °F (36.2 °C), resp. rate 16, height 5' 7\" (1.702 m), weight 183 lb 10.3 oz (83.3 kg), last menstrual period 10/14/2021, SpO2 99 %. For further details of [facility] emergency department encounter, please see documentation by advanced practice provider, Scott Molina.     Issa Cruz DO (electronically signed)  Attending Emergency Physician       Issa Cruz DO  11/03/21 4290

## 2021-11-03 NOTE — ED PROVIDER NOTES
[facility]        Pt Name: Don Palacios  MRN: [id number]  [place] 1975  Date of evaluation: 11/2/2021  Provider: Amie López PA-C  PCP: Sona Mckinley  Note Started: 8:18 PM EDT        I have seen and evaluated this patient with my supervising physician Macrina Denis, [facility]       Chief Complaint   Patient presents with    Fall     Pt. arrived via EMS with c/o fall x 3 within the last half hour and denies hitting head Pt. states that she slid out of bed and tried to get up and fell 2 more times.  Wheezing      Pt. has noted audible expiratory wheezes and denies any SOB or trouble breathing       HISTORY OF PRESENT ILLNESS   (Location, Timing/Onset, Context/Setting, Quality, Duration, Modifying Factors, Severity, Associated Signs and Symptoms)  Note limiting factors. Chief Complaint: Gera Palacios is a 55 y.o. female who presents evaluation of generalized weakness and frequent falls. Patient states that she had a 3 controlled falls this evening. States that she had slid out of the bed but then could not get back up. She denies hitting her head or any loss of consciousness. States that she just feels generally weak. No focal weakness. No dizziness/lightheadedness, visual disturbances, facial asymmetry, speech difficulty or syncope. She denies headache. No neck or back pain. She denies any injury from the incident. She does have history of dialysis, Monday, Wednesday and Friday. 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 She denies any chest pain or shortness of breath from baseline. She has no other complaints or concerns at this time. Nursing Notes were all reviewed and agreed with or any disagreements were addressed in the HPI.     REVIEW OF SYSTEMS    (2-9 systems for level 4, 10 or more for level 5)     Review of Systems   Constitutional: Negative for appetite change, chills and fever. HENT: Negative for congestion and rhinorrhea. Eyes: Negative for visual disturbance. Respiratory: Negative for cough, shortness of breath and wheezing. Cardiovascular: Negative for chest pain. Gastrointestinal: Negative for abdominal pain, diarrhea, nausea and vomiting. Genitourinary: Negative for difficulty urinating, dysuria and hematuria. Musculoskeletal: Negative for neck pain and neck stiffness. Skin: Negative for rash. Neurological: Positive for weakness. Negative for dizziness, syncope, light-headedness, numbness and headaches. Positives and Pertinent negatives as per HPI. Except as noted above in the ROS, all other systems were reviewed and negative. PAST MEDICAL HISTORY     Past Medical History:   Diagnosis Date    Blind in both eyes     Cerebral artery occlusion with cerebral infarction ([address]     CHF (congestive heart failure) (HCC)     Chronic kidney disease     Depression     Diabetes mellitus out of control ([address]     Diabetes mellitus, type II ([address]     2005    Diabetic neuropathy associated with type 2 diabetes mellitus ([address]     Generalized headaches     Hypertension     Infertility     Insomnia     chronic vs lack of time spent to sleep    Migraine headache 11/09/2011    Mixed hyperlipidemia     Otitis media     h/o recurrent    Pelvic abscess in female 10/05/2013    Pneumonia     2004 approx.     Stroke ([address] 08/27/2020    Stroke ([address] 08/27/2021         SURGICAL HISTORY     Past Surgical History:   Procedure Laterality Date    CERVIX SURGERY      laser tx for dysplasia;1992    EYE SURGERY      FOOT SURGERY Right     FOOT SURGERY Bilateral     FOOT SURGERY Left     IR TUNNELED CATHETER PLACEMENT GREATER THAN 5 YEARS  9/7/2021    IR TUNNELED CATHETER PLACEMENT GREATER THAN 5 YEARS [facility]7/2021 [facility]ED[facility] CURRENTMEDICATIONS       Previous Medications    ALBUTEROL SULFATE HFA (PROVENTIL HFA) 108 (90 BASE) MCG/ACT INHALER    Inhale 2 puffs into the lungs every 4 hours as needed for Wheezing    ALBUTEROL SULFATE  (90 BASE) MCG/ACT INHALER    Inhale 2 puffs into the lungs every 6 hours as needed for Wheezing or Shortness of Breath    ALPRAZOLAM (XANAX XR) 2 MG EXTENDED RELEASE TABLET    Take 1 tablet by mouth every morning for 30 days. ASPIRIN 81 MG EC TABLET    Take 1 tablet by mouth daily    ATORVASTATIN (LIPITOR) 40 MG TABLET    Take 1 tablet by mouth nightly    BENZONATATE (TESSALON) 200 MG CAPSULE    Take 1 capsule by mouth every 8 hours as needed for Cough    CLONAZEPAM (KLONOPIN) 0.5 MG TABLET    Take 0.5 mg by mouth 2 times daily as needed. CLONIDINE (CATAPRES) 0.2 MG/24HR PTWK    Place 1 patch onto the skin once a week    DULAGLUTIDE 0.75 MG/0.5ML SOPN    Inject 0.75 mg into the skin once a week    FLUVOXAMINE (LUVOX) 50 MG TABLET    Take 1 tablet by mouth nightly    GLUCOSE (GLUTOSE) 40 % GEL    Take 37.5 mLs by mouth as needed (low blood sugar)    GLYCOPYRROLATE-FORMOTEROL (BEVESPI AEROSPHERE) 9-4.8 MCG/ACT AERO    Inhale 2 puffs into the lungs 2 times daily    INSULIN GLARGINE (LANTUS SOLOSTAR) 100 UNIT/ML INJECTION PEN    Inject 20 Units into the skin every morning     INSULIN LISPRO, 1 UNIT DIAL, 100 UNIT/ML SOPN    Inject 0-6 Units into the skin 3 times daily (with meals) **Corrective Low Dose Algorithm**  Glucose: Dose:               No Insulin  140-199 1 Unit  200-249 2 Units  250-299 3 Units  300-349 4 Units  350-399 5 Units  Over 399 6 Units    INSULIN PEN NEEDLE 29G X 12.7MM MISC    1 each by Does not apply route daily    LISINOPRIL (PRINIVIL;ZESTRIL) 40 MG TABLET    Take 40 mg by mouth daily    LORAZEPAM (ATIVAN) 0.5 MG TABLET    Take 0.5 mg by mouth every 8 hours as needed for Anxiety. MISC.  DEVICES (PULSE OXIMETER) MISC    1 each by Does not apply route every 6-8 hours as needed (shortness of breath)    NASAL DILATORS (BREATHE RIGHT EXTRA STRENGTH) STRP    1 strip by Does not apply route nightly as needed (nasal congestion)    PREDNISONE (DELTASONE) 20 MG TABLET    Take 2 tablets by mouth daily for 5 days    PREGABALIN (LYRICA) 75 MG CAPSULE    Take 1 capsule by mouth nightly for 30 days. SPIRONOLACTONE (ALDACTONE) 50 MG TABLET    Take 1 tablet by mouth daily         ALLERGIES     Amoxicillin, Levofloxacin, Vancomycin, and Tape [adhesive tape]    FAMILYHISTORY       Family History   Problem Relation Age of Onset    Diabetes Mother    Shaila Safer Other Mother 79        Covid    Diabetes Father     High Blood Pressure Father     Colon Cancer Father     Diabetes Sister     Alcohol Abuse Maternal Grandfather     Diabetes Paternal Grandmother     Alcohol Abuse Paternal Grandfather     Diabetes Paternal Aunt     Diabetes Paternal Uncle           SOCIAL HISTORY       Social History     Tobacco Use    Smoking status: Former Smoker     Packs/day: 1.00     Types: Cigarettes    Smokeless tobacco: Never Used    Tobacco comment: Quit in August 2021   Vaping Use    Vaping Use: Never used   Substance Use Topics    Alcohol use: No     Comment: Very Rare    Drug use: No       SCREENINGS   NIH Stroke Scale  Interval: Baseline  Level of Consciousness (1a. ): Alert  LOC Questions (1b. ):  Answers both correctly  LOC Commands (1c. ): Performs both tasks correctly  Best Gaze (2. ): Normal  Visual (3. ): No visual loss  Facial Palsy (4. ): Normal symmetrical movement  Motor Arm, Left (5a. ): No drift  Motor Arm, Right (5b. ): Drift, but does not hit bed  Motor Leg, Left (6a. ): No drift  Motor Leg, Right (6b. ): Drift, but does not hit bed (previous deficit from prior stroke)  Limb Ataxia (7. ): Absent  Sensory (8. ): Normal  Best Language (9. ): No aphasia  Dysarthria (10. ): Normal  Extinction and Inattention (11): No abnormality  Total: 2Glasgow Coma Scale  Eye Opening: Spontaneous  Best Verbal Response: Oriented  Best Motor Response: Obeys commands  [place] Coma Scale Score: 15        PHYSICAL EXAM    (up to 7 for level 4, 8 or more for level 5)     ED Triage Vitals [11/02/21 2008]   BP Temp Temp Source Pulse Resp SpO2 Height Weight   (!) 143/79 97.7 °F (36.5 °C) Oral 84 15 97 % 5' 7\" (1.702 m) 175 lb (79.4 kg)       Physical Exam  Vitals and nursing note reviewed. Constitutional:       General: She is not in acute distress. Appearance: She is well-developed. She is not ill-appearing, toxic-appearing or diaphoretic. HENT:      Head: Normocephalic and atraumatic. Right Ear: External ear normal.      Left Ear: External ear normal.      Nose: Nose normal.      Mouth/Throat:      Mouth: Mucous membranes are dry. Eyes:      General:         Right eye: No discharge. Left eye: No discharge. Cardiovascular:      Rate and Rhythm: Normal rate and regular rhythm. Heart sounds: Normal heart sounds. Pulmonary:      Effort: Pulmonary effort is normal. No respiratory distress. Breath sounds: Stridor (chronic) present. Abdominal:      General: There is no distension. Palpations: Abdomen is soft. Tenderness: There is no abdominal tenderness. There is no guarding or rebound. Musculoskeletal:         General: Normal range of motion. Cervical back: Normal range of motion and neck supple. Skin:     General: Skin is warm and dry. Neurological:      Mental Status: She is alert and oriented to person, place, and time. Mental status is at baseline. GCS: GCS eye subscore is 4. GCS verbal subscore is 5. GCS motor subscore is 6. Cranial Nerves: Cranial nerves are intact. Sensory: Sensation is intact. Motor: Weakness (general) present.    Psychiatric:         Behavior: Behavior normal.         DIAGNOSTIC RESULTS   LABS:    Labs Reviewed   CBC WITH AUTO DIFFERENTIAL - Abnormal; Notable for the following components:       Result Value    WBC 17.7 (*)     RBC 3.97 (*)     Hemoglobin 10.8 (*)     Hematocrit 35.0 (*)     MCHC 30.8 (*)     RDW 16.9 (*)     Neutrophils Absolute 15.2 (*)     All other components within normal limits    Narrative:     Performed at:  [facility] [address] [employer]   Phone [phone]   COMPREHENSIVE METABOLIC PANEL - Abnormal; Notable for the following components:    Chloride 97 (*)     Glucose 118 (*)     BUN 34 (*)     CREATININE 3.8 (*)     GFR Non- 13 (*)     GFR African American 15 (*)     Alkaline Phosphatase 130 (*)     AST 14 (*)     All other components within normal limits    Narrative:     Performed at:  [facility] [address] [employer]   Phone [phone]   URINE RT REFLEX TO CULTURE - Abnormal; Notable for the following components:    Glucose, Ur 100 (*)     Blood, Urine TRACE (*)     All other components within normal limits    Narrative:     Performed at:  [facility] [address] [employer]   Phone [phone]   TROPONIN - Abnormal; Notable for the following components:    Troponin 0.35 (*)     All other components within normal limits    Narrative:     Performed at:  [facility] [address] [employer]   Phone [phone]  - Abnormal; Notable for the following components:    Pro-BNP 10,450 (*)     All other components within normal limits    Narrative:     Performed at:  [facility] [address]   Phone [phone]   LIPASE    Narrative:     Performed at:  [facility] [address]   Phone [phone]   [address] [place] [address] Avenue       When ordered only abnormal lab results are displayed.  All other labs were within normal range or not

## 2021-11-03 NOTE — PROGRESS NOTES
Progress Note - Dr. Skylar Henao - Internal Medicine  PCP: Shonna Rossi [address] [facility] / [address]    Hospital Day: 1  Code Status: Full Code  Current Diet: ADULT DIET; Regular; Low Potassium (Less than 3000 mg/day); Low Phosphorus (Less than 1000 mg)        CC: follow up on medical issues    Subjective:   Lore Flanagan is a 55 y.o. female. Pt seen and examined  Chart reviewed since last visit, labs and imaging below      Doing okay this morning. She is due for dialysis today. Had long discussion with patient about her multiple readmissions, and likely need for SNF on discharge. She is somewhat reluctant to consider this, but we will wait and see what therapy evaluations today. She denies chest pain, denies shortness of breath, denies nausea,  denies emesis. 10 system Review of Systems is reviewed with patient, and pertinent positives are noted in HPI above . Otherwise, Review of systems is negative. I have reviewed the patient's medical and social history in detail and updated the computerized patient record. To recap: She  has a past medical history of Blind in both eyes, Cerebral artery occlusion with cerebral infarction ([address] CHF (congestive heart failure) ([address] Chronic kidney disease, Depression, Diabetes mellitus out of control ([address] Diabetes mellitus, type II ([address] Diabetic neuropathy associated with type 2 diabetes mellitus ([address] Generalized headaches, Hypertension, Infertility, Insomnia, Migraine headache, Mixed hyperlipidemia, Otitis media, Pelvic abscess in female, Pneumonia, Stroke [facility]), and Stroke ([address] . She  has a past surgical history that includes Cervix surgery; eye surgery; Foot surgery (Right); Foot surgery (Bilateral); Foot surgery (Left); and IR TUNNELED CVC PLACE WO SQ PORT/PUMP > 5 YEARS (9/7/2021). . She  reports that she has quit smoking. Her smoking use included cigarettes. She smoked 1.00 pack per day.  She has never used smokeless tobacco. She reports that she does not drink alcohol and does not use drugs. .        Active Hospital Problems    Diagnosis Date Noted    ESRD (end stage renal disease) ([facility] [N18.6] 10/04/2021    HTN (hypertension), benign [I10]     History of medication noncompliance [Z91.14]     Anemia [D64.9] 10/05/2013    Type 2 diabetes mellitus, with long-term current use of insulin (HCC) [E11.9, Z79.4]     Mixed hyperlipidemia [E78.2]        Current Facility-Administered Medications: aspirin EC tablet 81 mg, 81 mg, Oral, Daily  [START ON 11/5/2021] Dulaglutide SOPN 0.75 mg, 0.75 mg, SubCUTAneous, Weekly  spironolactone (ALDACTONE) tablet 50 mg, 50 mg, Oral, Daily  atorvastatin (LIPITOR) tablet 40 mg, 40 mg, Oral, Nightly  glucose (GLUTOSE) 40 % oral gel 15 g, 15 g, Oral, PRN  clonazePAM (KLONOPIN) tablet 0.5 mg, 0.5 mg, Oral, BID  tiotropium-olodaterol (STIOLTO) 2.5-2.5 MCG/ACT inhaler 2 puff, 2 puff, Inhalation, Daily  insulin glargine (LANTUS;BASAGLAR) injection pen 20 Units, 20 Units, SubCUTAneous, QAM  lisinopril (PRINIVIL;ZESTRIL) tablet 40 mg, 40 mg, Oral, Daily  LORazepam (ATIVAN) tablet 0.5 mg, 0.5 mg, Oral, Q8H PRN  albuterol sulfate  (90 Base) MCG/ACT inhaler 2 puff, 2 puff, Inhalation, Q4H PRN  pregabalin (LYRICA) capsule 75 mg, 75 mg, Oral, Nightly  [START ON 11/8/2021] cloNIDine (CATAPRES) 0.2 MG/24HR 1 patch, 1 patch, TransDERmal, Weekly  fluvoxaMINE (LUVOX) tablet 50 mg, 50 mg, Oral, Nightly  benzonatate (TESSALON) capsule 200 mg, 200 mg, Oral, Q8H PRN  predniSONE (DELTASONE) tablet 40 mg, 40 mg, Oral, Daily  sodium chloride flush 0.9 % injection 10 mL, 10 mL, IntraVENous, 2 times per day  sodium chloride flush 0.9 % injection 10 mL, 10 mL, IntraVENous, PRN  0.9 % sodium chloride infusion, 25 mL, IntraVENous, PRN  ondansetron (ZOFRAN-ODT) disintegrating tablet 4 mg, 4 mg, Oral, Q8H PRN **OR** ondansetron (ZOFRAN) injection 4 mg, 4 mg, IntraVENous, Q6H PRN  acetaminophen (TYLENOL) tablet 650 mg, 650 mg, Oral, Q6H PRN **OR** acetaminophen (TYLENOL) suppository 650 mg, 650 mg, Rectal, Q6H PRN  hydrALAZINE (APRESOLINE) injection 10 mg, 10 mg, IntraVENous, Q6H PRN  0.9 % sodium chloride bolus, 500 mL, IntraVENous, PRN  insulin lispro (1 Unit Dial) 0-12 Units, 0-12 Units, SubCUTAneous, TID WC  insulin lispro (1 Unit Dial) 0-6 Units, 0-6 Units, SubCUTAneous, Nightly  glucose (GLUTOSE) 40 % oral gel 15 g, 15 g, Oral, PRN  dextrose 50 % IV solution, 12.5 g, IntraVENous, PRN  glucagon (rDNA) injection 1 mg, 1 mg, IntraMUSCular, PRN  dextrose 5 % solution, 100 mL/hr, IntraVENous, PRN  heparin (porcine) injection 5,000 Units, 5,000 Units, SubCUTAneous, 3 times per day  albuterol sulfate  (90 Base) MCG/ACT inhaler 2 puff, 2 puff, Inhalation, 4x daily         Objective:  /76   Pulse 88   Temp 97.6 °F (36.4 °C) (Oral)   Resp 16   Ht 5' 7\" (1.702 m)   Wt 184 lb 9.6 oz (83.7 kg)   LMP 10/14/2021   SpO2 99%   BMI 28.91 kg/m²      Patient Vitals for the past 24 hrs:   BP Temp Temp src Pulse Resp SpO2 Height Weight   11/03/21 0859 -- -- -- -- 16 99 % -- --   11/03/21 0800 134/76 97.6 °F (36.4 °C) Oral 88 15 98 % -- --   11/03/21 0406 139/80 97.3 °F (36.3 °C) Axillary 85 16 100 % -- --   11/03/21 0003 (!) 154/82 97.4 °F (36.3 °C) Oral 85 18 97 % 5' 7\" (1.702 m) 184 lb 9.6 oz (83.7 kg)   11/02/21 2330 135/84 -- -- 84 12 -- -- --   11/02/21 2315 (!) 147/89 -- -- 85 12 -- -- --   11/02/21 2300 (!) 155/81 -- -- 85 12 -- -- --   11/02/21 2245 (!) 160/84 -- -- 83 13 -- -- --   11/02/21 2238 -- -- -- 87 13 98 % -- --   11/02/21 2237 -- -- -- 84 12 -- -- --   11/02/21 2236 -- -- -- 84 15 -- -- --   11/02/21 2235 -- -- -- 83 12 -- -- --   11/02/21 2234 -- -- -- 83 12 (!) 87 % -- --   11/02/21 2233 -- -- -- 84 13 -- -- --   11/02/21 2232 -- -- -- 84 12 -- -- --   11/02/21 2231 -- -- -- 86 14 -- -- --   11/02/21 2230 (!) 156/91 -- -- 84 13 -- -- --   11/02/21 2222 -- -- -- -- 18 93 % -- --   11/02/21 2112 132/73 -- -- 82 20 -- -- --   11/02/21 2008 (!) 143/79 97.7 °F (36.5 °C) Oral 84 15 97 % 5' 7\" (1.702 m) 175 lb (79.4 kg)     Patient Vitals for the past 96 hrs (Last 3 readings):   Weight   11/03/21 0003 184 lb 9.6 oz (83.7 kg)   11/02/21 2008 175 lb (79.4 kg)         No intake or output data in the 24 hours ending 11/03/21 0916      Physical Exam:   Vitals as above  General appearance: alert, appears stated age and cooperative    Head: Normocephalic, without obvious abnormality, atraumatic    Lungs: clear to auscultation bilaterally    Heart: regular rate and rhythm, S1, S2 normal, no murmur    Abdomen: soft, non-tender; bowel sounds normal; no masses, no organomegaly    Extremities: extremities normal, atraumatic, no cyanosis, no edema      Labs:  Lab Results   Component Value Date    WBC 17.7 (H) 11/02/2021    HGB 10.8 (L) 11/02/2021    HCT 35.0 (L) 11/02/2021     11/02/2021    CHOL 164 02/24/2021    TRIG 319 (H) 08/31/2021    HDL 41 02/24/2021    LDLDIRECT 100 (H) 04/18/2011    ALT 10 11/03/2021    AST 19 11/03/2021     (L) 11/03/2021    K 5.1 11/03/2021    K 5.1 11/03/2021    CL 96 (L) 11/03/2021    CREATININE 4.4 (H) 11/03/2021    BUN 38 (H) 11/03/2021    CO2 28 11/03/2021    INR 1.00 09/07/2021    LABA1C 5.3 10/20/2021    LABMICR YES 11/02/2021     Lab Results   Component Value Date    CKTOTAL 25 (L) 09/13/2021    TROPONINI 0.30 (H) 11/03/2021       Recent Imaging Results are Reviewed:  XR NECK SOFT TISSUE    Result Date: 10/20/2021  EXAMINATION: TWO XRAY VIEWS OF THE NECK SOFT TISSUES 10/20/2021 10:35 am COMPARISON: 10/16/2021 HISTORY: ORDERING SYSTEM PROVIDED HISTORY: stridor TECHNOLOGIST PROVIDED HISTORY: Reason for exam:->stridor Reason for Exam: Shortness of Breath (in by EMS from home was d/c yesterday after being inpatient for 3 days, patient states it was related to sob from missing dialysis, patient reporting she has a severe panic disorder and when she cant breath she panics and it causes this response ) Acuity: Acute Type of Exam: Initial FINDINGS: Epiglottis again appears normal.  No foreign body. No significant soft tissue swelling. Mild narrowing of the glottis again noted. Mild narrowing the glottis again noted Otherwise, unremarkable soft tissues of the neck     XR NECK SOFT TISSUE    Result Date: 10/16/2021  EXAMINATION: TWO XRAY VIEWS OF THE NECK SOFT TISSUES 10/16/2021 10:32 pm COMPARISON: None. HISTORY: ORDERING SYSTEM PROVIDED HISTORY: stridor TECHNOLOGIST PROVIDED HISTORY: Reason for exam:->stridor FINDINGS: The upper airway is patent. The retropharyngeal soft tissues and epiglottis are not thickened. There is narrowing at the glottis. No radiopaque foreign bodies are seen over the airway. Retropharyngeal soft tissues and epiglottis appear normal.  There is some narrowing at the glottis. CT SOFT TISSUE NECK W CONTRAST    Result Date: 10/20/2021  EXAMINATION: CT OF THE NECK SOFT TISSUE WITH CONTRAST  10/20/2021 TECHNIQUE: CT of the neck was performed with the administration of intravenous contrast. Multiplanar reformatted images are provided for review. Dose modulation, iterative reconstruction, and/or weight based adjustment of the mA/kV was utilized to reduce the radiation dose to as low as reasonably achievable. COMPARISON: CTA head and neck 08/27/2020 HISTORY: ORDERING SYSTEM PROVIDED HISTORY: epiglottitis TECHNOLOGIST PROVIDED HISTORY: Reason for exam:->epiglottitis Decision Support Exception - unselect if not a suspected or confirmed emergency medical condition->Emergency Medical Condition (MA) Reason for Exam: headache Acuity: Unknown Type of Exam: Unknown FINDINGS: PHARYNX/LARYNX: Nasopharynx and oropharynx appear normal.Parapharyngeal tissue planes are preserved. Oral cavity and floor of mouth appear normal within constraints of artifact from dental amalgam. spaces appear normal.The hypopharynx and infraglottic trachea are unremarkable.   There is asymmetric Pulmonary vessels are normal in caliber. There is no focal airspace disease, pleural effusion or pneumothorax. No acute cardiopulmonary disease. Stable enlargement of the cardiac silhouette. XR CHEST PORTABLE    Result Date: 10/20/2021  EXAMINATION: ONE XRAY VIEW OF THE CHEST 10/20/2021 10:35 am COMPARISON: 10/16/2021 HISTORY: ORDERING SYSTEM PROVIDED HISTORY: sob TECHNOLOGIST PROVIDED HISTORY: Reason for exam:->sob Reason for Exam: Shortness of Breath (in by EMS from home was d/c yesterday after being inpatient for 3 days, patient states it was related to sob from missing dialysis, patient reporting she has a severe panic disorder and when she cant breath she panics and it causes this response ) Acuity: Acute Type of Exam: Initial FINDINGS: Right-sided central venous catheter remains in place. The lungs are without acute focal process. There is no effusion or pneumothorax. The cardiomediastinal silhouette is stable. The osseous structures are stable. No acute process. Stable cardiomegaly     XR CHEST PORTABLE    Result Date: 10/16/2021  EXAMINATION: ONE XRAY VIEW OF THE CHEST 10/16/2021 7:06 pm COMPARISON: None. HISTORY: ORDERING SYSTEM PROVIDED HISTORY: SOB TECHNOLOGIST PROVIDED HISTORY: Reason for exam:->SOB Reason for Exam: SOB Acuity: Acute Type of Exam: Initial FINDINGS: There is a right subclavian catheter with its tip in the right atrium. The cardiomediastinal silhouette is mildly enlarged. There is mild pulmonary vascular congestion. There is no pleural effusion. There is no pneumothorax. There is no acute osseous abnormality. Mild pulmonary vascular congestion. Mild cardiomegaly. XR CHEST PORTABLE    Result Date: 10/6/2021  EXAMINATION: ONE XRAY VIEW OF THE CHEST 10/4/2021 2:33 pm COMPARISON: 10/03/2021 HISTORY: ORDERING SYSTEM PROVIDED HISTORY: SOB TECHNOLOGIST PROVIDED HISTORY: Reason for exam:->SOB Reason for Exam: Shortness of breath.  Acuity: Acute Type of Exam: Initial mildly enlarged and somewhat heterogeneous. The right jugular vein hemodialysis catheter ends in the upper right atrium. Lungs/pleura: The trachea and mainstem bronchi are widely patent. There is interlobular septal thickening with diffuse bilateral lower lobe airspace disease and ground-glass opacity. No cavitary lesions. No discrete pulmonary nodule or mass. Moderate right and small left pleural effusions. No pericardial effusion. No pneumothorax. Soft Tissues/Bones: Degenerative changes are present throughout the thoracic spine. Upper Abdomen: The visualized upper abdomen is unremarkable. No evidence of pulmonary embolism or acute thoracic aortic abnormality. Cardiomegaly with moderate pulmonary vascular congestive change, moderate right, and small left pleural effusions. Minimal superimposed bibasilar atelectasis. No significant pericardial effusion. Lab Results   Component Value Date    GLUCOSE 177 11/03/2021     Lab Results   Component Value Date    POCGLU 148 11/03/2021     /76   Pulse 88   Temp 97.6 °F (36.4 °C) (Oral)   Resp 16   Ht 5' 7\" (1.702 m)   Wt 184 lb 9.6 oz (83.7 kg)   LMP 10/14/2021   SpO2 99%   BMI 28.91 kg/m²     Assessment and Plan:  Principal Problem:    ESRD (end stage renal disease) (HCC)-Established problem. Stable. Plan: For dialysis today. Nephrology consulted  Active Problems:    Type 2 diabetes mellitus, with long-term current use of insulin (HCC)-Established problem. Stable. Fingerstick 148  Plan: Continue controlled carb diet, home meds, sliding scale insulin    Mixed hyperlipidemia  Plan: Continue on statin, monitor for myalgias    Anemia-Established problem. Stable. Hemoglobin 10.8  Plan: No indication for transfusion. Cont to monitor h/h to assess progression of anemia. Recommend ferrous sulfate or MVI as outpatient. History of medication noncompliance    HTN (hypertension), benign-Established problem. Stable.   134/76   plan: Continue medications.       (Please note that portions of this note were completed with a voice recognition program.  Efforts were made to edit the dictations but occasionally words are mis-transcribed.)        Alex Sheets MD  11/3/2021

## 2021-11-03 NOTE — H&P
History and Physical  Dr. Elana Zapata  11/2/2021    PCP: Katerina Hankins    Cc:   Chief Complaint   Patient presents with    Fall     Pt. arrived via EMS with c/o fall x 3 within the last half hour and denies hitting head Pt. states that she slid out of bed and tried to get up and fell 2 more times.  Wheezing      Pt. has noted audible expiratory wheezes and denies any SOB or trouble breathing       HPI:  Elizabet Paz is a 55 y.o. female who has a past medical history of Blind in both eyes, Cerebral artery occlusion with cerebral infarction ([address] CHF (congestive heart failure) ([address] Chronic kidney disease, Depression, Diabetes mellitus out of control ([address] Diabetes mellitus, type II ([address] Diabetic neuropathy associated with type 2 diabetes mellitus ([address] Generalized headaches, Hypertension, Infertility, Insomnia, Migraine headache, Mixed hyperlipidemia, Otitis media, Pelvic abscess in female, Pneumonia, Stroke ([address] and Stroke ([address] Patient presents with ESRD (end stage renal disease) ([facility] HPI  (1-3 for expanded problem focused, ?4 for detailed/comprehensive)     55 y.o. female who presents evaluation of generalized weakness and frequent falls. Patient states that she had a 3 controlled falls this evening. States that she had slid out of the bed but then could not get back up. She denies hitting her head or any loss of consciousness. States that she just feels generally weak. No focal weakness. No dizziness/lightheadedness, visual disturbances, facial asymmetry, speech difficulty or syncope. She denies headache. No neck or back pain. She denies any injury from the incident. She does have history of dialysis, Monday, Wednesday and Friday. 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     She has had multiple recent admits  SNF may be necessary as it is becoming clear she is unable to care for herself at home  Pt/ot asked to see    Renal notified of admit as she is due for dialysis tomorrow    Problem list of hospitalization thus far: Active Hospital Problems    Diagnosis     ESRD (end stage renal disease) ([facility] [N18.6]     HTN (hypertension), benign [I10]     History of medication noncompliance [Z91.14]     Anemia [D64.9]     Type 2 diabetes mellitus, with long-term current use of insulin (HCC) [E11.9, Z79.4]     Mixed hyperlipidemia [E78.2]          Review of Systems: (1 system for EPF, 2-9 for detailed, 10+ for comprehensive)  Constitutional: Negative for chills and fever. HENT: Negative for dental problem, nosebleeds and rhinorrhea. Eyes: Negative for photophobia and visual disturbance. Respiratory: Negative for cough, chest tightness and shortness of breath. Cardiovascular: Negative for chest pain and leg swelling. Gastrointestinal: Negative for diarrhea, nausea and vomiting. Endocrine: Negative for polydipsia and polyphagia. Genitourinary: Negative for frequency, hematuria and urgency. Musculoskeletal: Negative for back pain and myalgias. Skin: Negative for rash. Allergic/Immunologic: Negative for food allergies. Neurological: Negative for dizziness, seizures, syncope and facial asymmetry. +Weakness/falls  Hematological: Negative for adenopathy. Psychiatric/Behavioral: Negative for dysphoric mood. The patient is not nervous/anxious.              Past Medical History:   Past Medical History:   Diagnosis Date    Blind in both eyes     Cerebral artery occlusion with cerebral infarction ([facility]     CHF (congestive heart failure) (HCC)     Chronic kidney disease     Depression     Diabetes mellitus out of control ([facility]     Diabetes mellitus, type II ([facility]     2005    Diabetic neuropathy associated with type 2 diabetes mellitus ([facility]     Generalized headaches     Hypertension     Infertility     Insomnia     chronic vs lack of time spent to sleep    Migraine headache 11/09/2011    Mixed hyperlipidemia     Otitis media     h/o recurrent    Pelvic abscess in female 10/05/2013    Pneumonia     2004 approx.  Stroke ([facility] 08/27/2020    Stroke ([facility] 08/27/2021       Past Surgical History:   Past Surgical History:   Procedure Laterality Date    CERVIX SURGERY      laser tx for dysplasia;1992    EYE SURGERY      FOOT SURGERY Right     FOOT SURGERY Bilateral     FOOT SURGERY Left     IR TUNNELED CATHETER PLACEMENT GREATER THAN 5 YEARS  9/7/2021    IR TUNNELED CATHETER PLACEMENT GREATER THAN 5 YEARS 9/7/2021 Unique Campbell MD [facility]ED[facility]       Social History:   Social History     Tobacco History     Smoking Status  Former Smoker Smoking Frequency  1 pack/day Smoking Tobacco Type  Cigarettes    Smokeless Tobacco Use  Never Used    Tobacco Comment  Quit in August 2021          Alcohol History     Alcohol Use Status  No Comment  Very Rare          Drug Use     Drug Use Status  No          Sexual Activity     Sexually Active  Yes Partners  Male                Fam History:   Family History   Problem Relation Age of Onset    Diabetes Mother    Samir Bark Other Mother 79        Covid    Diabetes Father     High Blood Pressure Father     Colon Cancer Father     Diabetes Sister     Alcohol Abuse Maternal Grandfather     Diabetes Paternal Grandmother     Alcohol Abuse Paternal Grandfather     Diabetes Paternal Aunt     Diabetes Paternal Uncle        PFSH: The above PMHx, PSHx, SocHx, FamHx has been reviewed by myself. (1 area for detailed, 2-3 for comprehensive)      Code Status: Prior    Meds - following list of home medications fromelectronic chart has been reviewed by myself  Prior to Admission medications    Medication Sig Start Date End Date Taking?  Authorizing Provider   albuterol sulfate  (90 Base) MCG/ACT inhaler Inhale 2 puffs into the lungs every 6 hours as needed for Wheezing or Shortness of Breath 11/1/21   Dionicio Salter pregabalin (LYRICA) 75 MG capsule Take 1 capsule by mouth nightly for 30 days. 11/1/21 12/1/21  Damon Mireles   cloNIDine (CATAPRES) 0.2 MG/24HR PTWK Place 1 patch onto the skin once a week 11/1/21   Damon Mireles   fluvoxaMINE (LUVOX) 50 MG tablet Take 1 tablet by mouth nightly 11/1/21 12/1/21  Damon Mireles   Misc. Devices (PULSE OXIMETER) MISC 1 each by Does not apply route every 6-8 hours as needed (shortness of breath) 11/1/21   Damon Mireles   ALPRAZolam (XANAX XR) 2 MG extended release tablet Take 1 tablet by mouth every morning for 30 days. 11/1/21 12/1/21  Damon Mireles   benzonatate (TESSALON) 200 MG capsule Take 1 capsule by mouth every 8 hours as needed for Cough 11/1/21   Damon Mireles   Nasal Dilators (BREATHE RIGHT EXTRA STRENGTH) STRP 1 strip by Does not apply route nightly as needed (nasal congestion) 11/1/21   Damon Mireles   predniSONE (DELTASONE) 20 MG tablet Take 2 tablets by mouth daily for 5 days 11/1/21 11/6/21  Damon Mireles   albuterol sulfate HFA (PROVENTIL HFA) 108 (90 Base) MCG/ACT inhaler Inhale 2 puffs into the lungs every 4 hours as needed for Wheezing 10/21/21   Carmina Garcia MD   LORazepam (ATIVAN) 0.5 MG tablet Take 0.5 mg by mouth every 8 hours as needed for Anxiety. Historical Provider, MD   insulin glargine (LANTUS SOLOSTAR) 100 UNIT/ML injection pen Inject 20 Units into the skin every morning     Historical Provider, MD   lisinopril (PRINIVIL;ZESTRIL) 40 MG tablet Take 40 mg by mouth daily    Historical Provider, MD   clonazePAM (KLONOPIN) 0.5 MG tablet Take 0.5 mg by mouth 2 times daily as needed.   8/20/21   Historical Provider, MD   glycopyrrolate-formoterol (BEVESPI AEROSPHERE) 9-4.8 MCG/ACT AERO Inhale 2 puffs into the lungs 2 times daily 9/23/21   Damon Mireles   glucose (GLUTOSE) 40 % GEL Take 37.5 mLs by mouth as needed (low blood sugar) 9/13/21   Wade Echols MD   Dulaglutide 0.75 MG/0.5ML SOPN Inject 0.75 mg into the skin once a week 7/8/21   Damon Mireles   spironolactone (ALDACTONE) 50 MG tablet Take 1 tablet by mouth daily 7/8/21   Damon Mireles   atorvastatin (LIPITOR) 40 MG tablet Take 1 tablet by mouth nightly 7/8/21   Damon Mireles   Insulin Pen Needle 29G X 12.7MM MISC 1 each by Does not apply route daily 7/8/21   Nany Dodge   aspirin 81 MG EC tablet Take 1 tablet by mouth daily 9/1/20   Jose E Ortiz MD   insulin lispro, 1 Unit Dial, 100 UNIT/ML SOPN Inject 0-6 Units into the skin 3 times daily (with meals) **Corrective Low Dose Algorithm**  Glucose: Dose:               No Insulin  140-199 1 Unit  200-249 2 Units  250-299 3 Units  300-349 4 Units  350-399 5 Units  Over 399 6 Units 4/29/20   Kaelyn Raza MD         Allergies   Allergen Reactions    Amoxicillin Itching and Hives     Tolerates cephalosporins  Patient tolerating cefazolin (ANCEF) as of October 11, 2018  Patient tolerating cefazolin (ANCEF) as of October 11, 2018  Tolerates cephalosporins  Patient tolerating cefazolin (ANCEF) as of October 11, 2018    Levofloxacin Anaphylaxis    Vancomycin Shortness Of Breath, Hives and Anaphylaxis    Tape [Adhesive Tape] Other (See Comments)     Paper tape turns skin bright red. Plastic tape okay.               EXAM: (2-7 system for EPF/Detailed, ?8 for Comprehensive)  /73   Pulse 82   Temp 97.7 °F (36.5 °C) (Oral)   Resp 20   Ht 5' 7\" (1.702 m)   Wt 175 lb (79.4 kg)   LMP 10/14/2021   SpO2 97%   BMI 27.41 kg/m²   Constitutional: vitals as above: alert, appears stated age and cooperative    Psychiatric: normal insight and judgment, oriented to person, place, time, and general circumstances    Head: Normocephalic, without obvious abnormality, atraumatic    Eyes:lids and lashes normal, conjunctivae and sclerae normal and pupils equal, round, reactive to light and accomodation    EMNT: external ears normal, nares midline    Neck: no carotid bruit, supple, symmetrical, trachea midline and thyroid not enlarged, symmetric, no tenderness/mass/nodules     Respiratory: clear to auscultation and percussion bilaterally with normal respiratory effort    Cardiovascular: normal rate, regular rhythm, normal S1 and S2 and no murmurs    Gastrointestinal: soft, non-tender, non-distended, normal bowel sounds, no masses or organomegaly    Extremities: no clubbing, trace edema bilat  Skin:No rashes or nodules noted.     Neurologic:negative         LABS:  Labs Reviewed   CBC WITH AUTO DIFFERENTIAL - Abnormal; Notable for the following components:       Result Value    WBC 17.7 (*)     RBC 3.97 (*)     Hemoglobin 10.8 (*)     Hematocrit 35.0 (*)     MCHC 30.8 (*)     RDW 16.9 (*)     Neutrophils Absolute 15.2 (*)     All other components within normal limits    Narrative:     Performed at:  [facility] [email or web address] [address] [address]   Phone [phone]   COMPREHENSIVE METABOLIC PANEL - Abnormal; Notable for the following components:    Chloride 97 (*)     Glucose 118 (*)     BUN 34 (*)     CREATININE 3.8 (*)     GFR Non- 13 (*)     GFR African American 15 (*)     Alkaline Phosphatase 130 (*)     AST 14 (*)     All other components within normal limits    Narrative:     Performed at:  [facility] [address], [place], [address]   Phone [phone]   URINE RT REFLEX TO CULTURE - Abnormal; Notable for the following components:    Glucose, Ur 100 (*)     Blood, Urine TRACE (*)     All other components within normal limits    Narrative:     Performed at:  [facility] [address],  [place] [address] [place]   Phone [phone]   TROPONIN - Abnormal; Notable for the following components:    Troponin 0.35 (*)     All other components within normal limits    Narrative:     Performed at:  [facility] [email or web address]  [place]   Phone  NATRIURETIC PEPTIDE - Abnormal; Notable for the following components:    Pro-BNP 10,450 (*)     All other components within normal limits    Narrative:     Performed at:  [facility]  [address] [facility], [address]   Phone [phone]   LIPASE    Narrative:     Performed at:  [facility]  [address] [facility], [address]   Phone [phone]   MICROSCOPIC URINALYSIS         IMAGING:  Imaging results from the ER have been reviewed in the computerized chart. XR NECK SOFT TISSUE    Result Date: 10/20/2021  EXAMINATION: TWO XRAY VIEWS OF THE NECK SOFT TISSUES 10/20/2021 10:35 am COMPARISON: 10/16/2021 HISTORY: ORDERING SYSTEM PROVIDED HISTORY: stridor TECHNOLOGIST PROVIDED HISTORY: Reason for exam:->stridor Reason for Exam: Shortness of Breath (in by EMS from home was d/c yesterday after being inpatient for 3 days, patient states it was related to sob from missing dialysis, patient reporting she has a severe panic disorder and when she cant breath she panics and it causes this response ) Acuity: Acute Type of Exam: Initial FINDINGS: Epiglottis again appears normal.  No foreign body. No significant soft tissue swelling. Mild narrowing of the glottis again noted. Mild narrowing the glottis again noted Otherwise, unremarkable soft tissues of the neck     XR NECK SOFT TISSUE    Result Date: 10/16/2021  EXAMINATION: TWO XRAY VIEWS OF THE NECK SOFT TISSUES 10/16/2021 10:32 pm COMPARISON: None. HISTORY: ORDERING SYSTEM PROVIDED HISTORY: stridor TECHNOLOGIST PROVIDED HISTORY: Reason for exam:->stridor FINDINGS: The upper airway is patent. The retropharyngeal soft tissues and epiglottis are not thickened. There is narrowing at the glottis. No radiopaque foreign bodies are seen over the airway. Retropharyngeal soft tissues and epiglottis appear normal.  There is some narrowing at the glottis.      CT SOFT TISSUE NECK W CONTRAST    Result are clear of focal consolidation or mass. Partially imaged right IJ central venous catheter. BONES:  No aggressive appearing lytic or blastic bony lesion. No significant spondylotic changes in the visualized spine. 1. Asymmetric thickening of the right aryepiglottic fold may reflect underlying edema and supraglottitis, however underlying mass not excluded. In addition, there is a suspected vocal cord polyp protruding into the larynx. Direct visual inspection is recommended. 2. No cervical lymphadenopathy. 3. Multinodular thyroid gland again noted with a dominant left 16 mm nodule. Nonemergent thyroid ultrasound should be considered. XR CHEST PORTABLE    Result Date: 11/2/2021  EXAMINATION: ONE XRAY VIEW OF THE CHEST 11/2/2021 8:29 pm COMPARISON: 10/20/2021 HISTORY: ORDERING SYSTEM PROVIDED HISTORY: SOB TECHNOLOGIST PROVIDED HISTORY: Reason for exam:->SOB Reason for Exam: sob Acuity: Unknown Type of Exam: Unknown FINDINGS: Right jugular tunneled venous catheter terminates over the right atrium. Cardiac silhouette is mildly enlarged. Pulmonary vessels are normal in caliber. There is no focal airspace disease, pleural effusion or pneumothorax. No acute cardiopulmonary disease. Stable enlargement of the cardiac silhouette. XR CHEST PORTABLE    Result Date: 10/20/2021  EXAMINATION: ONE XRAY VIEW OF THE CHEST 10/20/2021 10:35 am COMPARISON: 10/16/2021 HISTORY: ORDERING SYSTEM PROVIDED HISTORY: sob TECHNOLOGIST PROVIDED HISTORY: Reason for exam:->sob Reason for Exam: Shortness of Breath (in by EMS from home was d/c yesterday after being inpatient for 3 days, patient states it was related to sob from missing dialysis, patient reporting she has a severe panic disorder and when she cant breath she panics and it causes this response ) Acuity: Acute Type of Exam: Initial FINDINGS: Right-sided central venous catheter remains in place. The lungs are without acute focal process.   There is no effusion or pneumothorax. The cardiomediastinal silhouette is stable. The osseous structures are stable. No acute process. Stable cardiomegaly     XR CHEST PORTABLE    Result Date: 10/16/2021  EXAMINATION: ONE XRAY VIEW OF THE CHEST 10/16/2021 7:06 pm COMPARISON: None. HISTORY: ORDERING SYSTEM PROVIDED HISTORY: SOB TECHNOLOGIST PROVIDED HISTORY: Reason for exam:->SOB Reason for Exam: SOB Acuity: Acute Type of Exam: Initial FINDINGS: There is a right subclavian catheter with its tip in the right atrium. The cardiomediastinal silhouette is mildly enlarged. There is mild pulmonary vascular congestion. There is no pleural effusion. There is no pneumothorax. There is no acute osseous abnormality. Mild pulmonary vascular congestion. Mild cardiomegaly. XR CHEST PORTABLE    Result Date: 10/6/2021  EXAMINATION: ONE XRAY VIEW OF THE CHEST 10/4/2021 2:33 pm COMPARISON: 10/03/2021 HISTORY: ORDERING SYSTEM PROVIDED HISTORY: SOB TECHNOLOGIST PROVIDED HISTORY: Reason for exam:->SOB Reason for Exam: Shortness of breath. Acuity: Acute Type of Exam: Initial FINDINGS: Worsening multifocal airspace consolidation in both lungs. Findings suggest worsening pneumonia. Removal of ET tube since prior. A right central line with tips in the SVC. No pneumothorax. No pleural effusion. Cardiomediastinal silhouette and bony thorax are unchanged. Extensive multifocal airspace disease in both lungs worsening since prior examination suggesting worsening pneumonia. CT CHEST PULMONARY EMBOLISM W CONTRAST    Result Date: 10/16/2021  EXAMINATION: CTA OF THE CHEST 10/16/2021 8:43 pm TECHNIQUE: CTA of the chest was performed after the administration of intravenous contrast.  Multiplanar reformatted images are provided for review. MIP images are provided for review. Dose modulation, iterative reconstruction, and/or weight based adjustment of the mA/kV was utilized to reduce the radiation dose to as low as reasonably achievable. COMPARISON: None. HISTORY: ORDERING SYSTEM PROVIDED HISTORY: pe- dialysis patient - okay to contrast TECHNOLOGIST PROVIDED HISTORY: Reason for exam:->pe- dialysis patient - okay to contrast Decision Support Exception - unselect if not a suspected or confirmed emergency medical condition->Emergency Medical Condition (MA) Reason for Exam: Shortness of Breath (SOB with audbile wheezes for over one week. Denies pulmonary hx or exposure to recent illness; quit smoking 8 weeks ago & has anxiety. Reports was in the ICU for unknown reason & was placed on vent in August 2021; seen on 10/4 & needed blood transfusion.  ) FINDINGS: Pulmonary Arteries: Pulmonary arteries are adequately opacified for evaluation. No evidence of intraluminal filling defect to suggest pulmonary embolism. Main pulmonary artery is normal in caliber. Mediastinum: The heart is enlarged. The thoracic aorta and proximal great vessels are patent and of normal caliber. No pericardial effusion. No enlarged or suspicious axillary, hilar, or mediastinal lymphadenopathy. The thyroid gland is mildly enlarged and somewhat heterogeneous. The right jugular vein hemodialysis catheter ends in the upper right atrium. Lungs/pleura: The trachea and mainstem bronchi are widely patent. There is interlobular septal thickening with diffuse bilateral lower lobe airspace disease and ground-glass opacity. No cavitary lesions. No discrete pulmonary nodule or mass. Moderate right and small left pleural effusions. No pericardial effusion. No pneumothorax. Soft Tissues/Bones: Degenerative changes are present throughout the thoracic spine. Upper Abdomen: The visualized upper abdomen is unremarkable. No evidence of pulmonary embolism or acute thoracic aortic abnormality. Cardiomegaly with moderate pulmonary vascular congestive change, moderate right, and small left pleural effusions. Minimal superimposed bibasilar atelectasis. No significant pericardial effusion. EKG:   EKG from ER, reviewed by self - it shows  NSR at 81. No acute st elevation  Old chart reviewed, EKG dated 10/20/21 is reviewed, there is not difference noted. Old study shows sinus rhythm at 93    Lab Results   Component Value Date    GLUCOSE 118 11/02/2021     Lab Results   Component Value Date    POCGLU 252 10/21/2021     /73   Pulse 82   Temp 97.7 °F (36.5 °C) (Oral)   Resp 20   Ht 5' 7\" (1.702 m)   Wt 175 lb (79.4 kg)   LMP 10/14/2021   SpO2 97%   BMI 27.41 kg/m²     MEDICAL DECISION MAKING:    Principal Problem:    ESRD (end stage renal disease) ([address] -Established problem. Stable. Plan: notify renal of admit, due for HD tomorrow  Active Problems:    Type 2 diabetes mellitus, with long-term current use of insulin ([address] -Established problem. Stable. fsbs 252  Plan: Patient placed on controlled carbohydrate diet. Fingerstick sugars to be checked to monitor for both hypoglycemia as well as hyperglycemia. Sliding scale insulin ordered. Glucagon and dextrose ordered for hypoglycemia. Patient will be continued on home medications. Hemoglobin a1c to be ordered to assess efficacy of therapy. Mixed hyperlipidemia -Established problem. Stable. Plan: cont statin    Anemia -Established problem. Stable.  hgb 10.8  Plan: No indication for transfusion. Cont to monitor h/h to assess progression of anemia. Recommend ferrous sulfate or MVI as outpatient. History of medication noncompliance    HTN (hypertension), benign -Established problem. Stable. 132/73  Plan: Pt home BP meds reviewed and will be continued. IV Hydralazine ordered for control of extremely high blood pressures. Will monitor labs to assess Creat/K for possible complications of medications. Diagnoses as listed above, designated as new or established and plan outlined for each. Data Reviewed:   (1) Lab tests were reviewed or ordered. (1) Radiology tests were reviewed or ordered.   (1) Medical test (Echo, EKG, PFT/ashley) were ordered. (1)History was not obtained from someone other than patient  (1) Old records were reviewed - see HPI/MDM for pertinent details if review done. (2) Case was discussed with another health care provider: [facility]  (2) Imaging was viewed by myself. (2) EKG  was viewed by myself. The patient is being placed in inpatient status with the expectation of requiring a hospital stay spanning at least two midnights for care and treatment of the problems noted in the problem list.  This determination is also based on thepatients comorbidities and past medical history, the severity and timing of the signs and symptoms upon presentation.     (Please note that portions of this note were completed with a voice recognition program.  Efforts were made to edit the dictations but occasionally words are mis-transcribed.)      Electronically signed by: Amadou Fleming MD 11/2/2021

## 2021-11-03 NOTE — PROGRESS NOTES
Physical Therapy    Facility/Department: [address] NURSING  Initial Assessment    NAME: Stacie Rodriguez  : 1975  MRN: [id number]    Date of Service: 11/3/2021    Discharge Recommendations:  PT Equipment Recommendations  Equipment Needed: Yes  Other: Additional bed rail for the foot of pt's bed (discussed with pt)     Stacie Rodriguez scored a 18/24 on the AM-PAC short mobility form. Current research shows that an AM-PAC score of 18 or greater is typically associated with a discharge to the patient's home setting. Based on the patient's AM-PAC score and their current functional mobility deficits, it is recommended that the patient have 2-3 sessions per week of Physical Therapy at d/c to increase the patient's independence. At this time, this patient demonstrates the endurance and safety to discharge home with 24/7 assist and outpatient PT and a follow up treatment frequency of 2-3x/wk. Please see assessment section for further patient specific details. If patient discharges prior to next session this note will serve as a discharge summary. Please see below for the latest assessment towards goals. Assessment   Body structures, Functions, Activity limitations: Decreased functional mobility ; Decreased balance;Decreased strength;Decreased endurance  Assessment: Pt is a 54 yo female admitted to [facility] for generalized weakness and falls at home. The patient has a hx of 2 CVAs and 2 mini strokes per her report, and impaired vision bilaterally. At this time the patient requires supervision-SBA for out of bed activity with use of UE support on RW for balance. The patient presents with some LE weakness but appears to be functioning near her baseline. Her spouse is able to provide 24/7 assist. The patient's falls appear to be centered around falling out of bed. Discussed additional modificiations pt can make to improve safety at home.  Recommending continued skilled PT in an outpatient setting to safely progress pt's tolerance to activity and independence with functional mobility in order to prevent additional falls. Treatment Diagnosis: Decreased endurance  Prognosis: Good  Decision Making: Low Complexity  Exam: MMT, balance, sensation, functional mobility  Clinical Presentation: Stable  PT Education: Goals;PT Role;Plan of Care;Energy Conservation;General Safety;Transfer Training;Equipment  Patient Education: D/c recommendations - pt verbalized understanding, taking active part in problem solving  Barriers to Learning: Visual  REQUIRES PT FOLLOW UP: Yes  Activity Tolerance  Activity Tolerance: Patient Tolerated treatment well       Patient Diagnosis(es): The primary encounter diagnosis was General weakness. Diagnoses of ESRD on dialysis ([facility] [address] and Elevated troponin were also pertinent to this visit. has a past medical history of Blind in both eyes, Cerebral artery occlusion with cerebral infarction ([address] CHF (congestive heart failure) ([address] Chronic kidney disease, Depression, Diabetes mellitus out of control ([facility] [address] Diabetes mellitus, type II ([facility] [address] Diabetic neuropathy associated with type 2 diabetes mellitus ([facility] [address] Generalized headaches, Hypertension, Infertility, Insomnia, Migraine headache, Mixed hyperlipidemia, Otitis media, Pelvic abscess in female, Pneumonia, Stroke [facility]), and Stroke ([facility] [address] has a past surgical history that includes Cervix surgery; eye surgery; Foot surgery (Right); Foot surgery (Bilateral); Foot surgery (Left); and IR TUNNELED CVC PLACE WO SQ PORT/PUMP > 5 YEARS (9/7/2021). Restrictions  Restrictions/Precautions  Restrictions/Precautions: Fall Risk (high)  Position Activity Restriction  Other position/activity restrictions: Per H&P on 11/2 \"46 y.o. female who presents evaluation of generalized weakness and frequent falls. Patient states that she had a 3 controlled falls this evening. States that she had slid out of the bed but then could not get back up.   She denies hitting her head or any loss of consciousness. States that she just feels generally weak. No focal weakness. No dizziness/lightheadedness, visual disturbances, facial asymmetry, speech difficulty or syncope. She denies headache. No neck or back pain. She denies any injury from the incident. She does have history of dialysis, Monday, Wednesday and Friday. 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 \"     Vision/Hearing  Vision: Impaired  Vision Exceptions:  (Strokes affected her vision + (B) macular degeneration, pt reports 60% of her vision remains in her (R) eye, blind in (L) eye. Color differential is limited in (R) eye.)  Hearing: Within functional limits       Subjective  General  Chart Reviewed: Yes  Patient assessed for rehabilitation services?: Yes  Family / Caregiver Present: No  Diagnosis: Generalized weakness  Follows Commands: Within Functional Limits  General Comment  Comments: Pt sitting on BSC upon therapist arrival. PCA assists pt back to bed. Subjective  Subjective: Pt reports no pain this date.  Agreeable to PT eval.  Pain Screening  Patient Currently in Pain: Denies  Vital Signs  Patient Currently in Pain: Denies       Orientation  Orientation  Overall Orientation Status: Within Functional Limits     Social/Functional History  Social/Functional History  Lives With: Spouse (able to provide 24/7 assist)  Type of Home:  (Condo, handicap accessible, ground floor)  Home Layout: One level  Home Access: Level entry  Bathroom Shower/Tub: Walk-in shower, Doors, Shower chair with back  [employer]: Standard (pt has already ordered toilet raiser)  Bathroom Equipment: Grab bars in shower, Hand-held shower  Bathroom Accessibility: Accessible  Home Equipment: Rolling walker, Reacher  ADL Assistance: Needs assistance (Spouse provides supervision for showers to make sure she gets all the soap off and assists with matching colors when pt is dressing)  Homemaking Assistance: Needs assistance (Pt assists with cooking; spouse does cooking and cleaning)  Ambulation Assistance: Independent (uses RW when feeling tired or when leaving the apartment, pt able to navigate her apartment indep despite visual deficits, spouse assists with nagivation in unfamilar environment)  Transfer Assistance: Needs assistance (Indep from taller firmer surfaces such as dinning room chair, office chair, etc; needs assistance from couch)  Active : No  Patient's  Info:  drives  Occupation: Unemployed (in the process of disability)  IADL Comments: Pt sleeps in flat bed, states she uses cotton sheets now which helps limit her sliding out of bed. States her winter pajamas are silkier and she believes this lead to the fall prior to admission, she was too weak to get up. Additional Comments: Pt would like a Rollator as pt has carpet and it is hard to navigate the space with the RW     Cognition   WFL    Objective  AROM RLE (degrees)  RLE AROM: WFL  RLE General AROM: Lacking ~10 deg knee extension with firm end feel  AROM LLE (degrees)  LLE AROM : WFL  Strength RLE  Strength RLE: WFL  Comment: 5/5 knee flex/ext; 4+/5 ankle DF; 4-/5 hip flexion  Strength LLE  Strength LLE: WFL  Comment: 5/5 knee flex/ext, ankle DF; 4-/5 hip flexion     Tone RLE  RLE Tone: Normotonic  Tone LLE  LLE Tone: Normotonic     Sensation  Overall Sensation Status: WNL     Bed mobility  Comment: Not observed this date, pt on [facility] on therapist arrival and sitting in chair at end of session. Transfers  Sit to Stand: Stand by assistance  Stand to sit: Stand by assistance  Comment: Pt stood to/from bed x3 trials with 1 LOB backward onto bed. Additional sit<>stand to chair at end of session. Pt with poor eccentric lowering, but is able to improve with verbal cues and use of UE support.      Ambulation  Ambulation?: Yes  Ambulation 1  Surface: level tile  Device: Rolling Walker  Assistance: Stand stop falling       Therapy Time   Individual Concurrent Group Co-treatment   Time In 1135         Time Out 1230         Minutes 55         Timed Code Treatment Minutes: [address] [place], [place], DPT #[id number]

## 2021-11-04 LAB
ANION GAP SERPL CALCULATED.3IONS-SCNC: 11 MMOL/L (ref 3–16)
BASOPHILS ABSOLUTE: 0 K/UL (ref 0–0.2)
BASOPHILS RELATIVE PERCENT: 0.3 %
BUN BLDV-MCNC: 30 MG/DL (ref 7–20)
CALCIUM SERPL-MCNC: 8.8 MG/DL (ref 8.3–10.6)
CHLORIDE BLD-SCNC: 98 MMOL/L (ref 99–110)
CO2: 28 MMOL/L (ref 21–32)
CREAT SERPL-MCNC: 4 MG/DL (ref 0.6–1.1)
EOSINOPHILS ABSOLUTE: 0 K/UL (ref 0–0.6)
EOSINOPHILS RELATIVE PERCENT: 0 %
GFR AFRICAN AMERICAN: 15
GFR NON-AFRICAN AMERICAN: 12
GLUCOSE BLD-MCNC: 154 MG/DL (ref 70–99)
GLUCOSE BLD-MCNC: 166 MG/DL (ref 70–99)
GLUCOSE BLD-MCNC: 253 MG/DL (ref 70–99)
GLUCOSE BLD-MCNC: 262 MG/DL (ref 70–99)
GLUCOSE BLD-MCNC: 321 MG/DL (ref 70–99)
HCT VFR BLD CALC: 31.1 % (ref 36–48)
HEMOGLOBIN: 10.1 G/DL (ref 12–16)
LYMPHOCYTES ABSOLUTE: 1.6 K/UL (ref 1–5.1)
LYMPHOCYTES RELATIVE PERCENT: 12.2 %
MCH RBC QN AUTO: 27.2 PG (ref 26–34)
MCHC RBC AUTO-ENTMCNC: 32.6 G/DL (ref 31–36)
MCV RBC AUTO: 83.4 FL (ref 80–100)
MONOCYTES ABSOLUTE: 0.8 K/UL (ref 0–1.3)
MONOCYTES RELATIVE PERCENT: 5.7 %
NEUTROPHILS ABSOLUTE: 10.8 K/UL (ref 1.7–7.7)
NEUTROPHILS RELATIVE PERCENT: 81.8 %
PDW BLD-RTO: 16.8 % (ref 12.4–15.4)
PERFORMED ON: ABNORMAL
PLATELET # BLD: 273 K/UL (ref 135–450)
PMV BLD AUTO: 8.4 FL (ref 5–10.5)
POTASSIUM SERPL-SCNC: 4.3 MMOL/L (ref 3.5–5.1)
RBC # BLD: 3.73 M/UL (ref 4–5.2)
SODIUM BLD-SCNC: 137 MMOL/L (ref 136–145)
WBC # BLD: 13.3 K/UL (ref 4–11)

## 2021-11-04 PROCEDURE — 94640 AIRWAY INHALATION TREATMENT: CPT

## 2021-11-04 PROCEDURE — 85025 COMPLETE CBC W/AUTO DIFF WBC: CPT

## 2021-11-04 PROCEDURE — 6370000000 HC RX 637 (ALT 250 FOR IP): Performed by: INTERNAL MEDICINE

## 2021-11-04 PROCEDURE — 97530 THERAPEUTIC ACTIVITIES: CPT

## 2021-11-04 PROCEDURE — 6360000002 HC RX W HCPCS: Performed by: INTERNAL MEDICINE

## 2021-11-04 PROCEDURE — 1200000000 HC SEMI PRIVATE

## 2021-11-04 PROCEDURE — 36415 COLL VENOUS BLD VENIPUNCTURE: CPT

## 2021-11-04 PROCEDURE — 2580000003 HC RX 258: Performed by: INTERNAL MEDICINE

## 2021-11-04 PROCEDURE — 80048 BASIC METABOLIC PNL TOTAL CA: CPT

## 2021-11-04 PROCEDURE — 94761 N-INVAS EAR/PLS OXIMETRY MLT: CPT

## 2021-11-04 ASSESSMENT — PAIN SCALES - GENERAL
PAINLEVEL_OUTOF10: 0

## 2021-11-04 NOTE — PROGRESS NOTES
Occupational Therapy  Faisal Jones      Attempted to see pt for OT evaluation. Pt sleeping soundly and difficult to arouse, Respiratory therapy just leaving the room, unable to awaken the pt. Will attempt again tomorrow, as schedule allows. Thank you.  Carmen Pandey., OTR/L, [id number]

## 2021-11-04 NOTE — PROGRESS NOTES
Physician Progress Note      PATIENT:               Malika Elizabeth  CSN #:                  [id number]  :                       1975  ADMIT DATE:       2021 7:59 PM  [address] [place] DATE:  RESPONDING  PROVIDER #:        Kriss Garces MD          QUERY TEXT:    Pt admitted with ESRD. Noted documentation of  Acute respiratory failure with   hypoxia on 2021 by ED provider Dr. Irena Fagan. If possible, please document   in progress notes and/or discharge summary whether: The medical record reflects the following:  Risk Factors: Does not use Home O2; Hx COPD, presents with c/o weakness, per   squad O2 sat 84%. Clinical Indicators: O2 sats 87% on room air on admission; VBG confirms   hypercapnia with mild acidosis; per Resp Therapy: Use of accessory muscles;   Per H&P: Pt. has noted audible expiratory wheezes. Treatment: O2 4L per NC; Resp therapy  Options provided:  -- Acute respiratory failure with hypoxia was confirmed as present on   admission  -- Acute respiratory failure with hypoxia was ruled out  -- Other - I will add my own diagnosis  -- Disagree - Not applicable / Not valid  -- Disagree - Clinically unable to determine / Unknown  -- Refer to Clinical Documentation Reviewer    PROVIDER RESPONSE TEXT:    The diagnosis of Acute respiratory failure with hypoxia was confirmed as   present on admission.     Query created by: Misti Dowling on 2021 10:42 AM      Electronically signed by:  Kriss Garces MD 2021 11:05 AM

## 2021-11-04 NOTE — PROGRESS NOTES
Progress Note - Dr. Rizwan Kolb - Internal Medicine  PCP: Baljit Jay [address] 1000 [facility] / [address] [facility]. 765 [facility]    Hospital Day: 2  Code Status: Full Code  Current Diet: ADULT DIET; Regular; Low Potassium (Less than 3000 mg/day); Low Phosphorus (Less than 1000 mg)        CC: follow up on medical issues    Subjective:   Tc Gama is a 55 y.o. female. She denies problems    Doing about the same. Physical therapy evaluation noted, they do recommend home discharge. However awaiting occupational therapy. Seemed to tolerate dialysis okay yesterday. Next treatment is due tomorrow. She denies chest pain, denies shortness of breath, denies    nausea,  denies emesis. 10 system Review of Systems is reviewed with patient, and pertinent positives are noted in HPI above . Otherwise, Review of systems is negative. I have reviewed the patient's medical and social history in detail and updated the computerized patient record. To recap: She  has a past medical history of Blind in both eyes, Cerebral artery occlusion with cerebral infarction ([address] CHF (congestive heart failure) ([address] Chronic kidney disease, Depression, Diabetes mellitus out of control ([address] Diabetes mellitus, type II ([address] Diabetic neuropathy associated with type 2 diabetes mellitus ([address] Generalized headaches, Hypertension, Infertility, Insomnia, Migraine headache, Mixed hyperlipidemia, Otitis media, Pelvic abscess in female, Pneumonia, Stroke [facility]), and Stroke ([address] . She  has a past surgical history that includes Cervix surgery; eye surgery; Foot surgery (Right); Foot surgery (Bilateral); Foot surgery (Left); and IR TUNNELED CVC PLACE WO SQ PORT/PUMP > 5 YEARS (9/7/2021). . She  reports that she has quit smoking. Her smoking use included cigarettes. She smoked 1.00 pack per day. She has never used smokeless tobacco. She reports that she does not drink alcohol and does not use drugs. [facility] Diagnosis Date Noted    ESRD (end stage renal disease) ([facility] [N18.6] 10/04/2021    HTN (hypertension), benign [I10]     History of medication noncompliance [Z91.14]     Anemia [D64.9] 10/05/2013    Type 2 diabetes mellitus, with long-term current use of insulin (HCC) [E11.9, Z79.4]     Mixed hyperlipidemia [E78.2]        Current Facility-Administered Medications: aspirin EC tablet 81 mg, 81 mg, Oral, Daily  [START ON 11/5/2021] Dulaglutide SOPN 0.75 mg, 0.75 mg, SubCUTAneous, Weekly  spironolactone (ALDACTONE) tablet 50 mg, 50 mg, Oral, Daily  atorvastatin (LIPITOR) tablet 40 mg, 40 mg, Oral, Nightly  glucose (GLUTOSE) 40 % oral gel 15 g, 15 g, Oral, PRN  clonazePAM (KLONOPIN) tablet 0.5 mg, 0.5 mg, Oral, BID  tiotropium-olodaterol (STIOLTO) 2.5-2.5 MCG/ACT inhaler 2 puff, 2 puff, Inhalation, Daily  insulin glargine (LANTUS;BASAGLAR) injection pen 20 Units, 20 Units, SubCUTAneous, QAM  lisinopril (PRINIVIL;ZESTRIL) tablet 40 mg, 40 mg, Oral, Daily  LORazepam (ATIVAN) tablet 0.5 mg, 0.5 mg, Oral, Q8H PRN  albuterol sulfate  (90 Base) MCG/ACT inhaler 2 puff, 2 puff, Inhalation, Q4H PRN  pregabalin (LYRICA) capsule 75 mg, 75 mg, Oral, Nightly  [START ON 11/8/2021] cloNIDine (CATAPRES) 0.2 MG/24HR 1 patch, 1 patch, TransDERmal, Weekly  fluvoxaMINE (LUVOX) tablet 50 mg, 50 mg, Oral, Nightly  benzonatate (TESSALON) capsule 200 mg, 200 mg, Oral, Q8H PRN  predniSONE (DELTASONE) tablet 40 mg, 40 mg, Oral, Daily  sodium chloride flush 0.9 % injection 10 mL, 10 mL, IntraVENous, 2 times per day  sodium chloride flush 0.9 % injection 10 mL, 10 mL, IntraVENous, PRN  0.9 % sodium chloride infusion, 25 mL, IntraVENous, PRN  ondansetron (ZOFRAN-ODT) disintegrating tablet 4 mg, 4 mg, Oral, Q8H PRN **OR** ondansetron (ZOFRAN) injection 4 mg, 4 mg, IntraVENous, Q6H PRN  acetaminophen (TYLENOL) tablet 650 mg, 650 mg, Oral, Q6H PRN **OR** acetaminophen (TYLENOL) suppository 650 mg, 650 mg, Rectal, Q6H PRN  hydrALAZINE (APRESOLINE) injection 10 mg, 10 mg, IntraVENous, Q6H PRN  0.9 % sodium chloride bolus, 500 mL, IntraVENous, PRN  insulin lispro (1 Unit Dial) 0-12 Units, 0-12 Units, SubCUTAneous, TID WC  insulin lispro (1 Unit Dial) 0-6 Units, 0-6 Units, SubCUTAneous, Nightly  glucose (GLUTOSE) 40 % oral gel 15 g, 15 g, Oral, PRN  dextrose 50 % IV solution, 12.5 g, IntraVENous, PRN  glucagon (rDNA) injection 1 mg, 1 mg, IntraMUSCular, PRN  dextrose 5 % solution, 100 mL/hr, IntraVENous, PRN  heparin (porcine) injection 5,000 Units, 5,000 Units, SubCUTAneous, 3 times per day  albuterol sulfate  (90 Base) MCG/ACT inhaler 2 puff, 2 puff, Inhalation, 4x daily  heparin (porcine) injection 3,600 Units, 3,600 Units, IntraCATHeter, PRN         Objective:  BP (!) 160/77   Pulse 91   Temp 98.2 °F (36.8 °C) (Oral)   Resp 16   Ht 5' 7\" (1.702 m)   Wt 181 lb 1.6 oz (82.1 kg)   LMP 10/14/2021   SpO2 95%   BMI 28.36 kg/m²      Patient Vitals for the past 24 hrs:   BP Temp Temp src Pulse Resp SpO2 Weight   11/04/21 0850 -- -- -- -- 16 95 % --   11/04/21 0438 -- -- -- -- -- -- 181 lb 1.6 oz (82.1 kg)   11/04/21 0406 (!) 160/77 98.2 °F (36.8 °C) Oral 91 16 94 % --   11/03/21 2330 (!) 160/78 98 °F (36.7 °C) Oral 96 16 94 % --   11/03/21 2105 -- -- -- -- 16 96 % --   11/03/21 2018 (!) 152/79 98.2 °F (36.8 °C) Oral 101 16 97 % --   11/03/21 1730 121/81 98.1 °F (36.7 °C) Oral 99 17 96 % --   11/03/21 1713 (!) 142/73 97.2 °F (36.2 °C) -- 93 16 -- 183 lb 10.3 oz (83.3 kg)   11/03/21 1400 (!) 149/59 97.5 °F (36.4 °C) -- 86 18 -- 190 lb 4.1 oz (86.3 kg)   11/03/21 1230 -- -- -- -- -- 99 % --   11/03/21 1030 133/73 -- -- 93 15 -- --     Patient Vitals for the past 96 hrs (Last 3 readings):   Weight   11/04/21 0438 181 lb 1.6 oz (82.1 kg)   11/03/21 1713 183 lb 10.3 oz (83.3 kg)   11/03/21 1400 190 lb 4.1 oz (86.3 kg)           Intake/Output Summary (Last 24 hours) at 11/4/2021 1015  Last data filed at 11/3/2021 1713  Gross per 24 hour   Intake 400 ml   Output 3400 ml   Net -3000 ml         Physical Exam:   BP (!) 160/77   Pulse 91   Temp 98.2 °F (36.8 °C) (Oral)   Resp 16   Ht 5' 7\" (1.702 m)   Wt 181 lb 1.6 oz (82.1 kg)   LMP 10/14/2021   SpO2 95%   BMI 28.36 kg/m²   General appearance: alert, appears stated age and cooperative  Head: Normocephalic, without obvious abnormality, atraumatic  Lungs: clear to auscultation bilaterally  Heart: regular rate and rhythm, S1, S2 normal, no murmur, click, rub or gallop  Abdomen: soft, non-tender; bowel sounds normal; no masses,  no organomegaly  Extremities: trace edema    Labs:  Lab Results   Component Value Date    WBC 13.3 (H) 11/04/2021    HGB 10.1 (L) 11/04/2021    HCT 31.1 (L) 11/04/2021     11/04/2021    CHOL 164 02/24/2021    TRIG 319 (H) 08/31/2021    HDL 41 02/24/2021    LDLDIRECT 100 (H) 04/18/2011    ALT 10 11/03/2021    AST 19 11/03/2021     11/04/2021    K 4.3 11/04/2021    CL 98 (L) 11/04/2021    CREATININE 4.0 (H) 11/04/2021    BUN 30 (H) 11/04/2021    CO2 28 11/04/2021    INR 1.00 09/07/2021    LABA1C 5.2 11/02/2021    LABMICR YES 11/02/2021     Lab Results   Component Value Date    CKTOTAL 25 (L) 09/13/2021    TROPONINI 0.30 (H) 11/03/2021           Recent Imaging Results are Reviewed:  XR NECK SOFT TISSUE    Result Date: 10/20/2021  EXAMINATION: TWO XRAY VIEWS OF THE NECK SOFT TISSUES 10/20/2021 10:35 am COMPARISON: 10/16/2021 HISTORY: ORDERING SYSTEM PROVIDED HISTORY: stridor TECHNOLOGIST PROVIDED HISTORY: Reason for exam:->stridor Reason for Exam: Shortness of Breath (in by EMS from home was d/c yesterday after being inpatient for 3 days, patient states it was related to sob from missing dialysis, patient reporting she has a severe panic disorder and when she cant breath she panics and it causes this response ) Acuity: Acute Type of Exam: Initial FINDINGS: Epiglottis again appears normal.  No foreign body. No significant soft tissue swelling.   Mild narrowing of the glottis again noted. Mild narrowing the glottis again noted Otherwise, unremarkable soft tissues of the neck     XR NECK SOFT TISSUE    Result Date: 10/16/2021  EXAMINATION: TWO XRAY VIEWS OF THE NECK SOFT TISSUES 10/16/2021 10:32 pm COMPARISON: None. HISTORY: ORDERING SYSTEM PROVIDED HISTORY: stridor TECHNOLOGIST PROVIDED HISTORY: Reason for exam:->stridor FINDINGS: The upper airway is patent. The retropharyngeal soft tissues and epiglottis are not thickened. There is narrowing at the glottis. No radiopaque foreign bodies are seen over the airway. Retropharyngeal soft tissues and epiglottis appear normal.  There is some narrowing at the glottis. CT SOFT TISSUE NECK W CONTRAST    Result Date: 10/20/2021  EXAMINATION: CT OF THE NECK SOFT TISSUE WITH CONTRAST  10/20/2021 TECHNIQUE: CT of the neck was performed with the administration of intravenous contrast. Multiplanar reformatted images are provided for review. Dose modulation, iterative reconstruction, and/or weight based adjustment of the mA/kV was utilized to reduce the radiation dose to as low as reasonably achievable. COMPARISON: CTA head and neck 08/27/2020 HISTORY: ORDERING SYSTEM PROVIDED HISTORY: epiglottitis TECHNOLOGIST PROVIDED HISTORY: Reason for exam:->epiglottitis Decision Support Exception - unselect if not a suspected or confirmed emergency medical condition->Emergency Medical Condition (MA) Reason for Exam: headache Acuity: Unknown Type of Exam: Unknown FINDINGS: PHARYNX/LARYNX: Nasopharynx and oropharynx appear normal.Parapharyngeal tissue planes are preserved. Oral cavity and floor of mouth appear normal within constraints of artifact from dental amalgam. spaces appear normal.The hypopharynx and infraglottic trachea are unremarkable. There is asymmetric thickening of the right aryepiglottic fold. There is a suspected vocal cord polyp protruding into the larynx (series 2, image 64 and series 602, image 77). Imaged upper esophagus is unremarkable. SALIVARY GLANDS/THYROID:The parotid and submandibular glands appear unremarkable. Again seen is a multinodular thyroid with a dominant 16 mm left thyroid lobe nodule. LYMPH NODES: No cervical or supraclavicular lymphadenopathy is seen. SOFT TISSUES: No appreciable soft tissue swelling. No mass within carotid spaces. Patent extracranial carotid systems and internal jugular veins bilaterally. BRAIN/ORBITS/SINUSES: No abnormal intracranial enhancement, mass effect, or hydrocephalus within the imaged brain. Orbital soft tissue planes are preserved. Imaged paranasal sinuses are clear. Mastoid air cells and middle ear cavities are clear. LUNG APICES/SUPERIOR MEDIASTINUM:Imaged lung apices are clear of focal consolidation or mass. Partially imaged right IJ central venous catheter. BONES:  No aggressive appearing lytic or blastic bony lesion. No significant spondylotic changes in the visualized spine. 1. Asymmetric thickening of the right aryepiglottic fold may reflect underlying edema and supraglottitis, however underlying mass not excluded. In addition, there is a suspected vocal cord polyp protruding into the larynx. Direct visual inspection is recommended. 2. No cervical lymphadenopathy. 3. Multinodular thyroid gland again noted with a dominant left 16 mm nodule. Nonemergent thyroid ultrasound should be considered. XR CHEST PORTABLE    Result Date: 11/2/2021  EXAMINATION: ONE XRAY VIEW OF THE CHEST 11/2/2021 8:29 pm COMPARISON: 10/20/2021 HISTORY: ORDERING SYSTEM PROVIDED HISTORY: SOB TECHNOLOGIST PROVIDED HISTORY: Reason for exam:->SOB Reason for Exam: sob Acuity: Unknown Type of Exam: Unknown FINDINGS: Right jugular tunneled venous catheter terminates over the right atrium. Cardiac silhouette is mildly enlarged. Pulmonary vessels are normal in caliber. There is no focal airspace disease, pleural effusion or pneumothorax. No acute cardiopulmonary disease.  Stable enlargement of the cardiac silhouette. XR CHEST PORTABLE    Result Date: 10/20/2021  EXAMINATION: ONE XRAY VIEW OF THE CHEST 10/20/2021 10:35 am COMPARISON: 10/16/2021 HISTORY: ORDERING SYSTEM PROVIDED HISTORY: sob TECHNOLOGIST PROVIDED HISTORY: Reason for exam:->sob Reason for Exam: Shortness of Breath (in by EMS from home was d/c yesterday after being inpatient for 3 days, patient states it was related to sob from missing dialysis, patient reporting she has a severe panic disorder and when she cant breath she panics and it causes this response ) Acuity: Acute Type of Exam: Initial FINDINGS: Right-sided central venous catheter remains in place. The lungs are without acute focal process. There is no effusion or pneumothorax. The cardiomediastinal silhouette is stable. The osseous structures are stable. No acute process. Stable cardiomegaly     XR CHEST PORTABLE    Result Date: 10/16/2021  EXAMINATION: ONE XRAY VIEW OF THE CHEST 10/16/2021 7:06 pm COMPARISON: None. HISTORY: ORDERING SYSTEM PROVIDED HISTORY: SOB TECHNOLOGIST PROVIDED HISTORY: Reason for exam:->SOB Reason for Exam: SOB Acuity: Acute Type of Exam: Initial FINDINGS: There is a right subclavian catheter with its tip in the right atrium. The cardiomediastinal silhouette is mildly enlarged. There is mild pulmonary vascular congestion. There is no pleural effusion. There is no pneumothorax. There is no acute osseous abnormality. Mild pulmonary vascular congestion. Mild cardiomegaly. CT CHEST PULMONARY EMBOLISM W CONTRAST    Result Date: 10/16/2021  EXAMINATION: CTA OF THE CHEST 10/16/2021 8:43 pm TECHNIQUE: CTA of the chest was performed after the administration of intravenous contrast.  Multiplanar reformatted images are provided for review. MIP images are provided for review. Dose modulation, iterative reconstruction, and/or weight based adjustment of the mA/kV was utilized to reduce the radiation dose to as low as reasonably achievable. COMPARISON: None. HISTORY: ORDERING SYSTEM PROVIDED HISTORY: pe- dialysis patient - okay to contrast TECHNOLOGIST PROVIDED HISTORY: Reason for exam:->pe- dialysis patient - okay to contrast Decision Support Exception - unselect if not a suspected or confirmed emergency medical condition->Emergency Medical Condition (MA) Reason for Exam: Shortness of Breath (SOB with audbile wheezes for over one week. Denies pulmonary hx or exposure to recent illness; quit smoking 8 weeks ago & has anxiety. Reports was in the ICU for unknown reason & was placed on vent in August 2021; seen on 10/4 & needed blood transfusion.  ) FINDINGS: Pulmonary Arteries: Pulmonary arteries are adequately opacified for evaluation. No evidence of intraluminal filling defect to suggest pulmonary embolism. Main pulmonary artery is normal in caliber. Mediastinum: The heart is enlarged. The thoracic aorta and proximal great vessels are patent and of normal caliber. No pericardial effusion. No enlarged or suspicious axillary, hilar, or mediastinal lymphadenopathy. The thyroid gland is mildly enlarged and somewhat heterogeneous. The right jugular vein hemodialysis catheter ends in the upper right atrium. Lungs/pleura: The trachea and mainstem bronchi are widely patent. There is interlobular septal thickening with diffuse bilateral lower lobe airspace disease and ground-glass opacity. No cavitary lesions. No discrete pulmonary nodule or mass. Moderate right and small left pleural effusions. No pericardial effusion. No pneumothorax. Soft Tissues/Bones: Degenerative changes are present throughout the thoracic spine. Upper Abdomen: The visualized upper abdomen is unremarkable. No evidence of pulmonary embolism or acute thoracic aortic abnormality. Cardiomegaly with moderate pulmonary vascular congestive change, moderate right, and small left pleural effusions. Minimal superimposed bibasilar atelectasis. No significant pericardial effusion.        Lab Results Component Value Date    GLUCOSE 166 11/04/2021     Lab Results   Component Value Date    POCGLU 154 11/04/2021       Assessment and Plan:  Principal Problem:    ESRD (end stage renal disease) (HCC)-Established problem. Stable. Creat 4.0  Plan: Next dialysis treatment tomorrow  Active Problems:    Type 2 diabetes mellitus, with long-term current use of insulin (HCC)-Established problem. Stable. Fingerstick 154  Plan: Continue controlled carbohydrate diet, sliding scale insulin, home medicines    Mixed hyperlipidemia  Plan: Continue statin, monitor for side effects such as myalgias    Anemia-Established problem. Stable. Chronic, felt due to renal disease  Plan: No indication for transfusion. Cont to monitor h/h to assess progression of anemia. Recommend ferrous sulfate or MVI as outpatient. History of medication noncompliance    HTN (hypertension), benign-Established problem. Elevated. 160/77  Continue home meds. IV hydralazine ordered as needed. Disposition:   Plan:  hopefully home tomorrow, continue to await occupational therapy evaluation. A patient has had multiple admissions over the past few months, she definitely needs home care at a minimum but possibly may still need SNF.           Alex Sheets MD  11/4/2021

## 2021-11-04 NOTE — PROGRESS NOTES
Physical Therapy  Facility/Department: [address] NURSING  Daily Treatment Note  NAME: Faisal Jones  : 1975  MRN: [id number]    Date of Service: 2021    Discharge Recommendations:  Faisal Jones scored a 18/24 on the AM-PAC short mobility form. Current research shows that an AM-PAC score of 18 or greater is typically associated with a discharge to the patient's home setting. Based on the patient's AM-PAC score and their current functional mobility deficits, it is recommended that the patient have 2-3 sessions per week of Physical Therapy at d/c to increase the patient's independence. At this time, this patient demonstrates the endurance and safety to discharge home with OP PT and a follow up treatment frequency of 2-3x/wk. Please see assessment section for further patient specific details. If patient discharges prior to next session this note will serve as a discharge summary. Please see below for the latest assessment towards goals. PT Equipment Recommendations  Equipment Needed: Yes  Other: Additional bed rail for the foot of pt's bed (discussed with pt)    Assessment   Body structures, Functions, Activity limitations: Decreased functional mobility ; Decreased balance;Decreased strength;Decreased endurance  Assessment: Pt continues to present close to her baseline. Decreased eccentric control when lowering to a seated position. Able to walk 140 ft with RW and SBA. Has 24/7 supervision/assist at home. Continued skilled PT to promote safe discharge home.   Treatment Diagnosis: Decreased endurance  Prognosis: Good  PT Education: Goals;PT Role;Plan of Care;Energy Conservation;General Safety;Transfer Training;Equipment  Patient Education: D/c recommendations - pt verbalized understanding, taking active part in problem solving  Barriers to Learning: Visual  REQUIRES PT FOLLOW UP: Yes  Activity Tolerance  Activity Tolerance: Patient Tolerated treatment well     Patient Diagnosis(es): The primary encounter diagnosis was General weakness. Diagnoses of ESRD on dialysis ([facility] Elevated troponin, and Acute respiratory failure with hypoxia ([facility] [address] were also pertinent to this visit. has a past medical history of Blind in both eyes, Cerebral artery occlusion with cerebral infarction ([facility] [address] CHF (congestive heart failure) ([facility] [address] Chronic kidney disease, Depression, Diabetes mellitus out of control ([facility] [address] Diabetes mellitus, type II ([facility] [facility] Diabetic neuropathy associated with type 2 diabetes mellitus ([facility] [address] Generalized headaches, Hypertension, Infertility, Insomnia, Migraine headache, Mixed hyperlipidemia, Otitis media, Pelvic abscess in female, Pneumonia, Stroke [facility]), and Stroke ([facility] [address] has a past surgical history that includes Cervix surgery; eye surgery; Foot surgery (Right); Foot surgery (Bilateral); Foot surgery (Left); and IR TUNNELED CVC PLACE WO SQ PORT/PUMP > 5 YEARS (9/7/2021). Restrictions  Restrictions/Precautions  Restrictions/Precautions: Fall Risk (high)  Position Activity Restriction  Other position/activity restrictions: Per H&P on 11/2 \"46 y.o. female who presents evaluation of generalized weakness and frequent falls. Patient states that she had a 3 controlled falls this evening. States that she had slid out of the bed but then could not get back up. She denies hitting her head or any loss of consciousness. States that she just feels generally weak. No focal weakness. No dizziness/lightheadedness, visual disturbances, facial asymmetry, speech difficulty or syncope. She denies headache. No neck or back pain. She denies any injury from the incident. She does have history of dialysis, Monday, Wednesday and Friday. 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 \"  Subjective   General  Chart Reviewed: Yes  Response To Previous Treatment: Patient with no complaints from previous session.   Family / precautions in place, Gait belt, Patient at risk for falls, Call light within reach, Left in chair, Chair alarm in place, Nurse notified     Therapy Time   Individual Concurrent Group Co-treatment   Time In 1057         Time Out 1126         Minutes 29         Timed Code Treatment Minutes: [address], [id number]

## 2021-11-04 NOTE — PROGRESS NOTES
MD Randi Chahal MD Booker Hug, MD                                  Office: [phone]                 Fax: [phone]          [facility] [employer]                    NEPHROLOGY  HEMO-DIALYSIS PROGRESS NOTE:     PATIENT NAME: Lev Villalba  : 1975  MRN: [id number]        Indication for Dialysis  ESRD  Patient seen on dialysis treatment. Tolerating treatment  Fairly well    Attempting 3 L fluid removal.    Bilateral rales. Neck. JVD visible  Cardiac No pericardial rub. Flow murmur. Chest: Bilateral Rales. No rhonchi  Ext :  Edema mild    Labs Reviewed  by me   Labs       Dialysis Treatment and Prescription reviewed          RX:  See dialysis flowsheet for specifics on access, blood flow rate, dialysate baths, duration of dialysis, anticoagulation and other technical information. COMMENTS:  stable on dialysis. Continue to Target dry weight and clearance. Monitor closely for any hypotension    Dialysis treatment plan and dialysis orders discussed with dialysis RN @ bedside    Tolerating dialysis well, with no complications. Good flow access. Anemia. Stable. Continue Aransep as per protocol. Fluid Overload. Stable  Fluid removal as tolerated with dialysis.   Stable from Renal.  Discussed with Patient

## 2021-11-04 NOTE — PROGRESS NOTES
MD Cailin Faith MD Ludie Finders, MD                                  Office: [phone]                 Fax: [phone]          [employer]                     NEPHROLOGY IN PATIENT PROGRESS NOTE:     PATIENT NAME: Abraham Mercer  : 1975  MRN: [id number]            Subjective:       Generalized weakness. Tolerated dialysis well. Less shortness of breath. No chest pain. Afebrile. Generalized weakness even on minimal exertion. Assessment and Plan    Assessment:     ESRD on hemodialysis. Diabetic nephropathy. Fluid overload. History of chronic systolic congestive heart failure. Tunneled hemodialysis catheter. Depression. Plan:       ESRD on hemodialysis-Next dialysis treatment tomorrow. History of CHF-chronic systolic and fluid overload.  -Attempt more fluid removal during dialysis. Brice Shepard Next repeat hemodialysis treatment tomorrow. Hypertension. Well controlled. Bilateral rales. Anemia stable. Patient weak and deconditioned. Multiple hospital admissions. Discussed with patient need to go to ECF. Patient not sure. Discussed with RN. High complexity                  EXAM  Vitals:    21 1200   BP: (!) 148/79   Pulse: 92   Resp: 16   Temp: 97.6 °F (36.4 °C)   SpO2: 99%       Intake/Output Summary (Last 24 hours) at 2021 1303  Last data filed at 2021 1015  Gross per 24 hour   Intake 640 ml   Output 3400 ml   Net -2760 ml        Not Ill Appearing. Moderate respiratory distress. Awake alert   no hypotension  External exam of the ears and nose are normal  HENT: exam is normal  Eyes: Pupils are equal, round, and reactive to light. Lymph Nodes. No axillary or cervical lymph nodes are palpable. Neck. JVD not visible. No lymph nodes palpable. CVS.  Heart sounds are normal.Palpation of the heart is normal. No murmurs. No pericardial rub.  RS.dullness on percussion of the lower chest wall. Bilateral Basal rales.    PA soft , bowel sounds are normal no distension and no tenderness to palpation. Skin No rash , No palpable nodules  Musculoskeletal: Normal range of motion. 1+ edema and no tenderness. CNS  No focal    Edema Worse. More awake and alert. All pulses are well felt      Principal Problem:    ESRD (end stage renal disease) ([address]  Active Problems:    Type 2 diabetes mellitus, with long-term current use of insulin (HCC)    Mixed hyperlipidemia    Anemia    History of medication noncompliance    HTN (hypertension), benign  Resolved Problems:    * No resolved hospital problems. *        Medications Reviewed by me   aspirin  81 mg Oral Daily    [START ON 11/5/2021] Dulaglutide  0.75 mg SubCUTAneous Weekly    spironolactone  50 mg Oral Daily    atorvastatin  40 mg Oral Nightly    clonazePAM  0.5 mg Oral BID    tiotropium-olodaterol  2 puff Inhalation Daily    insulin glargine  20 Units SubCUTAneous QAM    lisinopril  40 mg Oral Daily    pregabalin  75 mg Oral Nightly    [START ON 11/8/2021] cloNIDine  1 patch TransDERmal Weekly    fluvoxaMINE  50 mg Oral Nightly    predniSONE  40 mg Oral Daily    sodium chloride flush  10 mL IntraVENous 2 times per day    insulin lispro  0-12 Units SubCUTAneous TID WC    insulin lispro  0-6 Units SubCUTAneous Nightly    heparin (porcine)  5,000 Units SubCUTAneous 3 times per day    albuterol sulfate HFA  2 puff Inhalation 4x daily      sodium chloride      dextrose         Data Review.     Labs reviewed by me       CBC:   Recent Labs     11/02/21 2035 11/04/21  0452   WBC 17.7* 13.3*   HGB 10.8* 10.1*   HCT 35.0* 31.1*   MCV 88.1 83.4    273     BMP:   Recent Labs     11/02/21 2035 11/03/21  0551 11/04/21  0452    135* 137   K 4.4 5.1  5.1 4.3   CL 97* 96* 98*   CO2 31 28 28   BUN 34* 38* 30*   CREATININE 3.8* 4.4* 4.0*     Magnesium:   Lab Results   Component Value Date    MG 1.80 09/13/2021    MG 1.90 09/12/2021    MG 1.90 09/11/2021     Lab Results   Component Value Date    CREATININE 4.0 11/04/2021       Arterial Blood Gasses  No results for input(s): PH, PCO2, PO2 in the last 72 hours. Invalid input(s): [id number], INSPIREDO2    UA:  Recent Labs     11/02/21 2102   COLORU YELLOW   PHUR 7.0   WBCUA None seen   RBCUA 0-2   CLARITYU Clear   SPECGRAV 1.012   LEUKOCYTESUR Negative   UROBILINOGEN 0.2   BILIRUBINUR Negative   BLOODU TRACE*   GLUCOSEU 100*       LIVER PROFILE:   Recent Labs     11/02/21 2035 11/03/21  0551   AST 14* 19   ALT 11 10   LIPASE 18.0  --    BILITOT 0.3 0.3   ALKPHOS 130* 125     PT/INR:    Lab Results   Component Value Date    PROTIME 11.3 09/07/2021    PROTIME 11.2 08/27/2021    PROTIME 10.6 08/27/2020    INR 1.00 09/07/2021    INR 0.99 08/27/2021    INR 0.91 08/27/2020     PTT:    Lab Results   Component Value Date    APTT 32.0 09/13/2021    APTT 29.1 09/12/2021    APTT 31.3 09/11/2021     NILSA:    Lab Results   Component Value Date    NILSA Negative 04/25/2020     CHEMISTRY COMMON GROUP :   Lab Results   Component Value Date    GLUCOSE 166 11/04/2021     Recent Labs     11/02/21 2035 11/03/21  0551 11/04/21  0452   GLUCOSE 118* 177* 166*   CALCIUM 9.3 9.0 8.8         RADIOLOGY:        Imaging Results. Chest X Ray reviwed by me    Chest Xray Reviewed by me  Renal Ultrasound Reviewed by me    EKG reviewed by me.                 Electronically Signed: Luigi Callahan MD 11/4/2021 1:03 PM

## 2021-11-04 NOTE — PLAN OF CARE
Problem: Skin Integrity:  Goal: Will show no infection signs and symptoms  Description: Will show no infection signs and symptoms  Outcome: Ongoing  Goal: Absence of new skin breakdown  Description: Absence of new skin breakdown  Outcome: Ongoing     Problem: Falls - Risk of:  Goal: Will remain free from falls  Description: Will remain free from falls  Outcome: Ongoing  Note: Pt educated on how to use call light to call RN. Pt remains free of falls. Pt refusing bed alarm. Pt is alert and oriented. Pt aware aware of needs.    Goal: Absence of physical injury  Description: Absence of physical injury  Outcome: Ongoing

## 2021-11-05 ENCOUNTER — CARE COORDINATION (OUTPATIENT)
Dept: CASE MANAGEMENT | Age: 46
End: 2021-11-05

## 2021-11-05 VITALS
BODY MASS INDEX: 27.96 KG/M2 | RESPIRATION RATE: 18 BRPM | OXYGEN SATURATION: 97 % | DIASTOLIC BLOOD PRESSURE: 95 MMHG | SYSTOLIC BLOOD PRESSURE: 181 MMHG | HEART RATE: 85 BPM | WEIGHT: 178.13 LBS | HEIGHT: 67 IN | TEMPERATURE: 97.9 F

## 2021-11-05 LAB
ANION GAP SERPL CALCULATED.3IONS-SCNC: 13 MMOL/L (ref 3–16)
BASOPHILS ABSOLUTE: 0.1 K/UL (ref 0–0.2)
BASOPHILS RELATIVE PERCENT: 0.4 %
BUN BLDV-MCNC: 54 MG/DL (ref 7–20)
CALCIUM SERPL-MCNC: 8.7 MG/DL (ref 8.3–10.6)
CHLORIDE BLD-SCNC: 97 MMOL/L (ref 99–110)
CO2: 25 MMOL/L (ref 21–32)
CREAT SERPL-MCNC: 5.2 MG/DL (ref 0.6–1.1)
EOSINOPHILS ABSOLUTE: 0 K/UL (ref 0–0.6)
EOSINOPHILS RELATIVE PERCENT: 0 %
GFR AFRICAN AMERICAN: 11
GFR NON-AFRICAN AMERICAN: 9
GLUCOSE BLD-MCNC: 110 MG/DL (ref 70–99)
GLUCOSE BLD-MCNC: 182 MG/DL (ref 70–99)
GLUCOSE BLD-MCNC: 242 MG/DL (ref 70–99)
HCT VFR BLD CALC: 31.6 % (ref 36–48)
HEMOGLOBIN: 10 G/DL (ref 12–16)
LYMPHOCYTES ABSOLUTE: 1.8 K/UL (ref 1–5.1)
LYMPHOCYTES RELATIVE PERCENT: 13.7 %
MCH RBC QN AUTO: 26.7 PG (ref 26–34)
MCHC RBC AUTO-ENTMCNC: 31.6 G/DL (ref 31–36)
MCV RBC AUTO: 84.3 FL (ref 80–100)
MONOCYTES ABSOLUTE: 0.7 K/UL (ref 0–1.3)
MONOCYTES RELATIVE PERCENT: 5.5 %
NEUTROPHILS ABSOLUTE: 10.6 K/UL (ref 1.7–7.7)
NEUTROPHILS RELATIVE PERCENT: 80.4 %
PDW BLD-RTO: 16.2 % (ref 12.4–15.4)
PERFORMED ON: ABNORMAL
PERFORMED ON: ABNORMAL
PLATELET # BLD: 282 K/UL (ref 135–450)
PMV BLD AUTO: 8.2 FL (ref 5–10.5)
POTASSIUM SERPL-SCNC: 4.2 MMOL/L (ref 3.5–5.1)
RBC # BLD: 3.74 M/UL (ref 4–5.2)
SODIUM BLD-SCNC: 135 MMOL/L (ref 136–145)
WBC # BLD: 13.2 K/UL (ref 4–11)

## 2021-11-05 PROCEDURE — 94640 AIRWAY INHALATION TREATMENT: CPT

## 2021-11-05 PROCEDURE — 90935 HEMODIALYSIS ONE EVALUATION: CPT

## 2021-11-05 PROCEDURE — 6370000000 HC RX 637 (ALT 250 FOR IP): Performed by: INTERNAL MEDICINE

## 2021-11-05 PROCEDURE — 2580000003 HC RX 258: Performed by: INTERNAL MEDICINE

## 2021-11-05 PROCEDURE — 97530 THERAPEUTIC ACTIVITIES: CPT

## 2021-11-05 PROCEDURE — 6360000002 HC RX W HCPCS: Performed by: INTERNAL MEDICINE

## 2021-11-05 PROCEDURE — 97535 SELF CARE MNGMENT TRAINING: CPT

## 2021-11-05 PROCEDURE — 94761 N-INVAS EAR/PLS OXIMETRY MLT: CPT

## 2021-11-05 PROCEDURE — 80048 BASIC METABOLIC PNL TOTAL CA: CPT

## 2021-11-05 PROCEDURE — 97165 OT EVAL LOW COMPLEX 30 MIN: CPT

## 2021-11-05 PROCEDURE — 85025 COMPLETE CBC W/AUTO DIFF WBC: CPT

## 2021-11-05 PROCEDURE — 36415 COLL VENOUS BLD VENIPUNCTURE: CPT

## 2021-11-05 RX ORDER — PREGABALIN 75 MG/1
75 CAPSULE ORAL NIGHTLY
Qty: 3 CAPSULE | Refills: 0 | Status: SHIPPED | OUTPATIENT
Start: 2021-11-05 | End: 2021-12-01

## 2021-11-05 RX ORDER — ALPRAZOLAM 2 MG/1
2 TABLET, EXTENDED RELEASE ORAL EVERY MORNING
Qty: 3 TABLET | Refills: 0 | Status: SHIPPED | OUTPATIENT
Start: 2021-11-05 | End: 2021-11-30 | Stop reason: SDUPTHER

## 2021-11-05 ASSESSMENT — PAIN SCALES - GENERAL
PAINLEVEL_OUTOF10: 0

## 2021-11-05 NOTE — FLOWSHEET NOTE
11/05/21 0757 11/05/21 1105   Treatment   Time On 0757  --    Time Off  --  1105   Treatment Goal 2000  --    Vital Signs   BP (!) 178/91 (!) 181/91   Temp 96.4 °F (35.8 °C) 96.5 °F (35.8 °C)   Pulse 83 80   Resp 18 18   SpO2 96 % 96 %   Weight 183 lb 6.8 oz (83.2 kg) 178 lb 2.1 oz (80.8 kg)   Weight Method Standing scale Standing scale   Percent Weight Change 0.18 -2.88   Technical Checks   Dialysis Machine No. [id number]  --    RO Machine No. [id number]  --    Dialyzer Lot No. [id number]  --    RO Machine Log Sheet Completed Yes  --    Machine Alarm Self Test Completed; Passed  --    Machine Autotest Completed; Passed  --    Air Foam Detector Tested;Proper Function  --    Extracorporeal Circuit Tested for Integrity Yes  --    Dialysis Bath   K+ (Potassium) 3  --    Ca+ (Calcium) 2.5  --    Na+ (Sodium) 137  --    HCO3 (Bicarb) 35  --    Hemodialysis Central Access Internal jugular Right Neck   Placement Date/Time: 09/07/21 1439   Pre-existing: No  Timeout: Patient;Sterile Technique using full body drape;Procedure;Site/Side;Appropriate Equipment;Site prepped with chlorhexidine; Consent Confirmed;Sterile drsg with biopatch; Correct Position;Hillary. .. Site Assessment Clean;Dry; Intact  --    Access Interventions Aseptic Technique  --    Dressing Intervention Dressing reinforced  --    Dressing Status Clean;Dry; Intact  --    Dressing Change Due 11/10/21  --    Post-Hemodialysis Assessment   Post-Treatment Procedures  --  Blood returned;Catheter capped, clamped and heparinized x 2 ports   Machine Disinfection Process  --  Acid/Vinegar Clean;Heat Disinfect; Exterior Machine Disinfection   Rinseback Volume (ml)  --  400 ml   Total Liters Processed (l/min)  --  60.8 l/min   Dialyzer Clearance  --  Lightly streaked   Duration of Treatment (minutes)  --  180 minutes   Hemodialysis Intake (ml)  --  400 ml   Hemodialysis Output (ml)  --  2703 ml   NET Removed (ml)  --  2300 ml   Tolerated Treatment  --  Good   Patient Response to Treatment  --  well   Treatment time: 3 hours   Net UF: 2300 ml    Pre weight: 83.1 kg  Post weight: 77.8 kg  EDW: 83.0 kg    Access used: [facility] TDC   Access function: Functioned well with  ml/min    Medications or blood products given: none    Regular outpatient schedule: [facility] MWF    Summary of response to treatment: Tolerated well. Copy of dialysis treatment record placed in chart, to be scanned into EMR.

## 2021-11-05 NOTE — PROGRESS NOTES
Occupational Therapy   Occupational Therapy Initial Assessment  Date: 2021   Patient Name: Kraig Rabago  MRN: [id number]     : 1975    Date of Service: 2021    Discharge Recommendations:Kristine Hilliard scored a 22/24 on the AM-PAC ADL Inpatient form. Current research shows that an AM-PAC score of 18 or greater is typically associated with a discharge to the patient's home setting. Based on the patient's AM-PAC score, and their current ADL deficits, it is recommended that the patient have 2-3 sessions per week of Occupational Therapy at d/c to increase the patient's independence. At this time, this patient demonstrates the endurance and safety to discharge home with home occupational therapy (home vs OP services) and a follow up treatment frequency of 2-3x/wk. Please see assessment section for further patient specific details. If patient discharges prior to next session this note will serve as a discharge summary. Please see below for the latest assessment towards goals. 2-3 sessions per week  OT Equipment Recommendations  Equipment Needed: No    Assessment   Performance deficits / Impairments: Decreased strength;Decreased functional mobility ; Decreased balance  Assessment: Patient presenting near her functional baseline. The above deficits are associated with underlying diagnosis of ESRD. Patient  able to provide 24 hour care/supervision; however, she would benefit from additional OT to assess and address functional limitations and ADL performance at home in order to maximize safety and fall prevention. Noted that she prefers outpatient therapy services at this time. Patient with DC order and therefore no further OT necessary while in hospital. .  Treatment Diagnosis: generalized weakness. Prognosis: Good  Decision Making: Low Complexity  Exam: as above. OT Education: OT Role;Plan of Care;Transfer Training;ADL Adaptive Strategies; Low Vision Education  Patient Education: DC planning  No Skilled OT: Safe to return home  REQUIRES OT FOLLOW UP: No  Activity Tolerance  Activity Tolerance: Patient Tolerated treatment well  Safety Devices  Safety Devices in place: Not Applicable  Restraints  Initially in place: No           Patient Diagnosis(es): The primary encounter diagnosis was General weakness. Diagnoses of ESRD on dialysis ([address] Elevated troponin, Acute respiratory failure with hypoxia ([address] Generalized anxiety disorder with panic attacks, and Other diabetic neurological complication associated with type 2 diabetes mellitus ([address] were also pertinent to this visit. has a past medical history of Blind in both eyes, Cerebral artery occlusion with cerebral infarction ([address] CHF (congestive heart failure) ([address] Chronic kidney disease, Depression, Diabetes mellitus out of control ([address] Diabetes mellitus, type II ([address] Diabetic neuropathy associated with type 2 diabetes mellitus ([address] Generalized headaches, Hypertension, Infertility, Insomnia, Migraine headache, Mixed hyperlipidemia, Otitis media, Pelvic abscess in female, Pneumonia, Stroke [facility]), and Stroke ([facility] has a past surgical history that includes Cervix surgery; eye surgery; Foot surgery (Right); Foot surgery (Bilateral); Foot surgery (Left); and IR TUNNELED CVC PLACE WO SQ PORT/PUMP > 5 YEARS (9/7/2021). Treatment Diagnosis: generalized weakness. Restrictions  Restrictions/Precautions  Restrictions/Precautions: Fall Risk (high)  Position Activity Restriction  Other position/activity restrictions: Per H&P on 11/2 \"46 y.o. female who presents evaluation of generalized weakness and frequent falls. Patient states that she had a 3 controlled falls this evening. States that she had slid out of the bed but then could not get back up. She denies hitting her head or any loss of consciousness. States that she just feels generally weak. No focal weakness.   No dizziness/lightheadedness, visual disturbances, facial asymmetry, speech difficulty or syncope. She denies headache. No neck or back pain. She denies any injury from the incident. She does have history of dialysis, Monday, Wednesday and Friday. 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 \"    Subjective   General  Patient assessed for rehabilitation services?: Yes  Diagnosis: Generalized Weaknes  Subjective  Subjective: patient supine in bed. Pleasant and agreeable to therapy. patient  arriving at end of session and participated in [address] General Comment  Comments: Nursing arriving during session and providing heparin and hydrolozine due to BP of 181/95. Patient has no complains and not symptomatic. Patient Currently in Pain: Denies  Pain Assessment  Pain Assessment: 0-10  Pain Level: 0  Vital Signs  Temp: 97.9 °F (36.6 °C)  Temp Source: Oral  Pulse: 85  Heart Rate Source: Monitor  Resp: 18  BP: (!) 181/95 (Nursing aware and administering meds. See General section for details. ) 171/89 after functional mobility assessments completed and meds given.    BP Location: Right upper arm  Patient Position: Sitting  Level of Consciousness: Alert (0)  MEWS Score: 1  Patient Currently in Pain: Denies  Oxygen Therapy  SpO2: 97 %  Pulse Oximeter Device Mode: Intermittent  Pulse Oximeter Device Location: Finger  O2 Device: None (Room air)  Social/Functional History  Social/Functional History  Lives With: Spouse  Type of Home:  (Condo, handicap accessible, ground floor)  Home Layout: One level  Home Access: Level entry  Bathroom Shower/Tub: Walk-in shower, Doors, Shower chair with back  [employer]: Standard  Bathroom Equipment: Grab bars in shower, Hand-held shower  Bathroom Accessibility: Accessible  Home Equipment: Rolling walker, Reacher  ADL Assistance: Needs assistance  Homemaking Assistance: Needs assistance  Ambulation Assistance: Independent  Transfer Assistance: Needs assistance  Active : No  Patient's  Info:  drives  Occupation: Unemployed  IADL Comments: Pt sleeps in flat bed, states she uses cotton sheets now which helps limit her sliding out of bed. States her winter pajamas are silkier and she believes this lead to the fall prior to admission, she was too weak to get up. Additional Comments: Pt would like a Rollator as pt has carpet and it is hard to navigate the space with the RW       Objective   Vision: Impaired  Hearing: Within functional limits    Orientation  Overall Orientation Status: Within Normal Limits  Observation/Palpation  Posture: Good  Balance  Sitting Balance: Independent  Standing Balance: Supervision  Standing Balance  Time: 6-8 min  Activity: transfers, ambulation activities  Functional Mobility  Functional - Mobility Device: No device  Activity: Other; To/from bathroom (around unit hallway)  Assist Level: Stand by assistance  Functional Mobility Comments: Ambulated 100 total feet in unit hallway with SBA without DME and cues for directionality due to low vision. Patient with smaller gait due unfamailiar enviornement considering low vsion  Toilet Transfers  Toilet - Technique: Ambulating  Equipment Used: Grab bars  Toilet Transfer: Minimal assistance;Stand by assistance  Toilet Transfers Comments: SBA for stand>sit on standard (low) commode with reduced eccentric control. Min A for sit> stand using grab bar for unilateral UE support . Patient has bilateral hand rails at home and reports  will often assist only when she is feeling week. ADL  LE Dressing: Independent (donning/doffing socks)  Additional Comments: Patient declines need to participate in remaining ADLs, indicating she has DC order and will be leaving soon.   Tone RUE  RUE Tone: Normotonic  Tone LUE  LUE Tone: Normotonic  Coordination  Movements Are Fluid And Coordinated: Yes     Bed mobility  Supine to Sit: Modified independent  Sit to Supine: Modified independent  Scooting: Independent  Transfers  Sit to stand: Supervision  Stand to sit: Supervision     Cognition  Overall Cognitive Status: WNL  Perception  Overall Perceptual Status: WFL     Sensation  Overall Sensation Status: WNL        LUE AROM (degrees)  LUE AROM : WNL  RUE AROM (degrees)  RUE AROM : WNL  LUE Strength  Gross LUE Strength: Exceptions to [facility]  L Shoulder Flex: 3+/5  L Shoulder ABduction: 3+/5  L Hand General: 3+/5  RUE Strength  Gross RUE Strength: Exceptions to [facility]  R Shoulder Flex: 3+/5  R Shoulder ABduction: 3+/5  R Hand General: 3+/5                   Plan        G-Code     OutComes Score                                                  AM-PAC Score        AM-[facility] Inpatient Daily Activity Raw Score: 22 (11/05/21 1602)  AM-PAC Inpatient ADL T-Scale Score : 47.1 (11/05/21 1602)  ADL Inpatient CMS 0-100% Score: 25.8 (11/05/21 1602)  ADL Inpatient CMS G-Code Modifier : CJ (11/05/21 1602)    Goals          Therapy Time   Individual Concurrent Group Co-treatment   Time In 0306         Time Out 0348         Minutes 42              Timed Code Treatment Minutes:  [address] OTR/L  [id number]

## 2021-11-05 NOTE — DISCHARGE SUMMARY
Data- discharge order received, pt verbalized agreement to discharge, needs for outpatient physical therapy, SARAH reviewed and signed by MD, to be completed by RN. Action- AVS prepared, discharge instructions prepared and given to pt and , medication information packet given r/t NEW or CHANGED prescriptions, pt verbalized understanding further self-review. D/C instruction summary: Diet- regular, renal, Activity- up as tolerated, follow up with Primary Janette Dye [facility] [phone] appointment 1-2 weeks, immunizations reviewed and updated, medications prescriptions to be filled at pharmacy, written prescriptions given. Inpatient surgical procedural reviewed: dialysis. Contact information provided to above agencies used. Response- Case Management/ reported faxing completed SARAH and AVS to needed HHC/DME services stated above. Pt belongings gathered, IV removed, pt dressed. Disposition is home with outpatient as stated above, transported with RN, taken to lobby via w/c with , no complications. 1. WEIGHT: Admit Weight: 175 lb (79.4 kg) (11/02/21 2008)        Today  Weight: 178 lb 2.1 oz (80.8 kg) (11/05/21 1105)       2.  O2 SAT.: SpO2: 97 % (11/05/21 1615)

## 2021-11-05 NOTE — PROGRESS NOTES
Occupational Therapy    Attempted to see patient for OT evaluation. Patient at dialysis this morning. Will attempt at later time.  Fabrizio Arizmendi OTR/L  [id number]

## 2021-11-05 NOTE — PROGRESS NOTES
Progress Note - Dr. Josh Ta - Internal Medicine  PCP: Tiffanie Duncan [address] [facility] [address] [facility] [place]: 3  Code Status: Full Code  Current Diet: ADULT DIET; Regular; Low Potassium (Less than 3000 mg/day); Low Phosphorus (Less than 1000 mg)        CC: follow up on medical issues    Subjective:   Quang Thompson is a 55 y.o. female. She denies problems    Doing about the same. awaiting occupational therapy. Pt seems quite debilitated. May need snf  Seemed to tolerate dialysis okay 11/3. Next treatment is due this am    She denies chest pain, denies shortness of breath, denies    nausea,  denies emesis. 10 system Review of Systems is reviewed with patient, and pertinent positives are noted in HPI above . Otherwise, Review of systems is negative. I have reviewed the patient's medical and social history in detail and updated the computerized patient record. To recap: She  has a past medical history of Blind in both eyes, Cerebral artery occlusion with cerebral infarction ([address] CHF (congestive heart failure) ([address] Chronic kidney disease, Depression, Diabetes mellitus out of control ([address] Diabetes mellitus, type II ([address] Diabetic neuropathy associated with type 2 diabetes mellitus ([address] Generalized headaches, Hypertension, Infertility, Insomnia, Migraine headache, Mixed hyperlipidemia, Otitis media, Pelvic abscess in female, Pneumonia, Stroke [facility]), and Stroke ([address] . She  has a past surgical history that includes Cervix surgery; eye surgery; Foot surgery (Right); Foot surgery (Bilateral); Foot surgery (Left); and IR TUNNELED CVC PLACE WO SQ PORT/PUMP > 5 YEARS (9/7/2021). . She  reports that she has quit smoking. Her smoking use included cigarettes. She smoked 1.00 pack per day. She has never used smokeless tobacco. She reports that she does not drink alcohol and does not use drugs. .        Active Hospital Problems    Diagnosis Date Noted    ESRD (end stage renal disease) ([facility] [N18.6] 10/04/2021    HTN (hypertension), benign [I10]     History of medication noncompliance [Z91.14]     Anemia [D64.9] 10/05/2013    Type 2 diabetes mellitus, with long-term current use of insulin (HCC) [E11.9, Z79.4]     Mixed hyperlipidemia [E78.2]        Current Facility-Administered Medications: influenza quadrivalent split vaccine (FLUZONE;FLUARIX;FLULAVAL;AFLURIA) injection 0.5 mL, 0.5 mL, IntraMUSCular, Prior to discharge  aspirin EC tablet 81 mg, 81 mg, Oral, Daily  Dulaglutide SOPN 0.75 mg, 0.75 mg, SubCUTAneous, Weekly  spironolactone (ALDACTONE) tablet 50 mg, 50 mg, Oral, Daily  atorvastatin (LIPITOR) tablet 40 mg, 40 mg, Oral, Nightly  glucose (GLUTOSE) 40 % oral gel 15 g, 15 g, Oral, PRN  clonazePAM (KLONOPIN) tablet 0.5 mg, 0.5 mg, Oral, BID  tiotropium-olodaterol (STIOLTO) 2.5-2.5 MCG/ACT inhaler 2 puff, 2 puff, Inhalation, Daily  insulin glargine (LANTUS;BASAGLAR) injection pen 20 Units, 20 Units, SubCUTAneous, QAM  lisinopril (PRINIVIL;ZESTRIL) tablet 40 mg, 40 mg, Oral, Daily  LORazepam (ATIVAN) tablet 0.5 mg, 0.5 mg, Oral, Q8H PRN  albuterol sulfate  (90 Base) MCG/ACT inhaler 2 puff, 2 puff, Inhalation, Q4H PRN  pregabalin (LYRICA) capsule 75 mg, 75 mg, Oral, Nightly  [START ON 11/8/2021] cloNIDine (CATAPRES) 0.2 MG/24HR 1 patch, 1 patch, TransDERmal, Weekly  fluvoxaMINE (LUVOX) tablet 50 mg, 50 mg, Oral, Nightly  benzonatate (TESSALON) capsule 200 mg, 200 mg, Oral, Q8H PRN  predniSONE (DELTASONE) tablet 40 mg, 40 mg, Oral, Daily  sodium chloride flush 0.9 % injection 10 mL, 10 mL, IntraVENous, 2 times per day  sodium chloride flush 0.9 % injection 10 mL, 10 mL, IntraVENous, PRN  0.9 % sodium chloride infusion, 25 mL, IntraVENous, PRN  ondansetron (ZOFRAN-ODT) disintegrating tablet 4 mg, 4 mg, Oral, Q8H PRN **OR** ondansetron (ZOFRAN) injection 4 mg, 4 mg, IntraVENous, Q6H PRN  acetaminophen (TYLENOL) tablet 650 mg, 650 mg, Oral, Q6H PRN **OR** acetaminophen (TYLENOL) suppository 650 mg, 650 mg, Rectal, Q6H PRN  hydrALAZINE (APRESOLINE) injection 10 mg, 10 mg, IntraVENous, Q6H PRN  0.9 % sodium chloride bolus, 500 mL, IntraVENous, PRN  insulin lispro (1 Unit Dial) 0-12 Units, 0-12 Units, SubCUTAneous, TID WC  insulin lispro (1 Unit Dial) 0-6 Units, 0-6 Units, SubCUTAneous, Nightly  glucose (GLUTOSE) 40 % oral gel 15 g, 15 g, Oral, PRN  dextrose 50 % IV solution, 12.5 g, IntraVENous, PRN  glucagon (rDNA) injection 1 mg, 1 mg, IntraMUSCular, PRN  dextrose 5 % solution, 100 mL/hr, IntraVENous, PRN  heparin (porcine) injection 5,000 Units, 5,000 Units, SubCUTAneous, 3 times per day  albuterol sulfate  (90 Base) MCG/ACT inhaler 2 puff, 2 puff, Inhalation, 4x daily  heparin (porcine) injection 3,600 Units, 3,600 Units, IntraCATHeter, PRN         Objective:  BP (!) 158/73   Pulse 89   Temp 97.8 °F (36.6 °C) (Oral)   Resp 18   Ht 5' 7\" (1.702 m)   Wt 183 lb 1.6 oz (83.1 kg)   LMP 10/14/2021   SpO2 96%   BMI 28.68 kg/m²      Patient Vitals for the past 24 hrs:   BP Temp Temp src Pulse Resp SpO2 Weight   11/05/21 0408 (!) 158/73 97.8 °F (36.6 °C) Oral 89 18 96 % 183 lb 1.6 oz (83.1 kg)   11/05/21 0029 (!) 166/84 97.9 °F (36.6 °C) Oral 90 16 97 % --   11/04/21 2049 (!) 171/81 98.2 °F (36.8 °C) Oral 94 18 90 % --   11/04/21 1916 -- -- -- -- 16 96 % --   11/04/21 1600 (!) 148/85 98.6 °F (37 °C) Oral 92 16 94 % --   11/04/21 1200 (!) 148/79 97.6 °F (36.4 °C) Oral 92 16 99 % --   11/04/21 1133 -- -- -- -- 16 98 % --   11/04/21 1015 (!) 164/74 97.8 °F (36.6 °C) Oral 90 16 94 % --   11/04/21 0850 -- -- -- -- 16 95 % --     Patient Vitals for the past 96 hrs (Last 3 readings):   Weight   11/05/21 0408 183 lb 1.6 oz (83.1 kg)   11/04/21 0438 181 lb 1.6 oz (82.1 kg)   11/03/21 1713 183 lb 10.3 oz (83.3 kg)           Intake/Output Summary (Last 24 hours) at 11/5/2021 0821  Last data filed at 11/4/2021 1015  Gross per 24 hour   Intake 240 ml   Output --   Net 240 ml Physical Exam:   BP (!) 158/73   Pulse 89   Temp 97.8 °F (36.6 °C) (Oral)   Resp 18   Ht 5' 7\" (1.702 m)   Wt 183 lb 1.6 oz (83.1 kg)   LMP 10/14/2021   SpO2 96%   BMI 28.68 kg/m²   General appearance: alert, appears stated age and cooperative  Head: Normocephalic, without obvious abnormality, atraumatic  Lungs: clear to auscultation bilaterally  Heart: regular rate and rhythm, S1, S2 normal, no murmur, click, rub or gallop  Abdomen: soft, non-tender; bowel sounds normal; no masses,  no organomegaly  Extremities: trace edema    Labs:  Lab Results   Component Value Date    WBC 13.2 (H) 11/05/2021    HGB 10.0 (L) 11/05/2021    HCT 31.6 (L) 11/05/2021     11/05/2021    CHOL 164 02/24/2021    TRIG 319 (H) 08/31/2021    HDL 41 02/24/2021    LDLDIRECT 100 (H) 04/18/2011    ALT 10 11/03/2021    AST 19 11/03/2021     (L) 11/05/2021    K 4.2 11/05/2021    CL 97 (L) 11/05/2021    CREATININE 5.2 (HH) 11/05/2021    BUN 54 (H) 11/05/2021    CO2 25 11/05/2021    INR 1.00 09/07/2021    LABA1C 5.2 11/02/2021    LABMICR YES 11/02/2021     Lab Results   Component Value Date    CKTOTAL 25 (L) 09/13/2021    TROPONINI 0.30 (H) 11/03/2021           Recent Imaging Results are Reviewed:  XR NECK SOFT TISSUE    Result Date: 10/20/2021  EXAMINATION: TWO XRAY VIEWS OF THE NECK SOFT TISSUES 10/20/2021 10:35 am COMPARISON: 10/16/2021 HISTORY: ORDERING SYSTEM PROVIDED HISTORY: stridor TECHNOLOGIST PROVIDED HISTORY: Reason for exam:->stridor Reason for Exam: Shortness of Breath (in by EMS from home was d/c yesterday after being inpatient for 3 days, patient states it was related to sob from missing dialysis, patient reporting she has a severe panic disorder and when she cant breath she panics and it causes this response ) Acuity: Acute Type of Exam: Initial FINDINGS: Epiglottis again appears normal.  No foreign body. No significant soft tissue swelling. Mild narrowing of the glottis again noted.      Mild narrowing the glottis again noted Otherwise, unremarkable soft tissues of the neck     XR NECK SOFT TISSUE    Result Date: 10/16/2021  EXAMINATION: TWO XRAY VIEWS OF THE NECK SOFT TISSUES 10/16/2021 10:32 pm COMPARISON: None. HISTORY: ORDERING SYSTEM PROVIDED HISTORY: stridor TECHNOLOGIST PROVIDED HISTORY: Reason for exam:->stridor FINDINGS: The upper airway is patent. The retropharyngeal soft tissues and epiglottis are not thickened. There is narrowing at the glottis. No radiopaque foreign bodies are seen over the airway. Retropharyngeal soft tissues and epiglottis appear normal.  There is some narrowing at the glottis. CT SOFT TISSUE NECK W CONTRAST    Result Date: 10/20/2021  EXAMINATION: CT OF THE NECK SOFT TISSUE WITH CONTRAST  10/20/2021 TECHNIQUE: CT of the neck was performed with the administration of intravenous contrast. Multiplanar reformatted images are provided for review. Dose modulation, iterative reconstruction, and/or weight based adjustment of the mA/kV was utilized to reduce the radiation dose to as low as reasonably achievable. COMPARISON: CTA head and neck 08/27/2020 HISTORY: ORDERING SYSTEM PROVIDED HISTORY: epiglottitis TECHNOLOGIST PROVIDED HISTORY: Reason for exam:->epiglottitis Decision Support Exception - unselect if not a suspected or confirmed emergency medical condition->Emergency Medical Condition (MA) Reason for Exam: headache Acuity: Unknown Type of Exam: Unknown FINDINGS: PHARYNX/LARYNX: Nasopharynx and oropharynx appear normal.Parapharyngeal tissue planes are preserved. Oral cavity and floor of mouth appear normal within constraints of artifact from dental amalgam. spaces appear normal.The hypopharynx and infraglottic trachea are unremarkable. There is asymmetric thickening of the right aryepiglottic fold. There is a suspected vocal cord polyp protruding into the larynx (series 2, image 64 and series 602, image 77). Imaged upper esophagus is unremarkable.  SALIVARY GLANDS/THYROID:The parotid and submandibular glands appear unremarkable. Again seen is a multinodular thyroid with a dominant 16 mm left thyroid lobe nodule. LYMPH NODES: No cervical or supraclavicular lymphadenopathy is seen. SOFT TISSUES: No appreciable soft tissue swelling. No mass within carotid spaces. Patent extracranial carotid systems and internal jugular veins bilaterally. BRAIN/ORBITS/SINUSES: No abnormal intracranial enhancement, mass effect, or hydrocephalus within the imaged brain. Orbital soft tissue planes are preserved. Imaged paranasal sinuses are clear. Mastoid air cells and middle ear cavities are clear. LUNG APICES/SUPERIOR MEDIASTINUM:Imaged lung apices are clear of focal consolidation or mass. Partially imaged right IJ central venous catheter. BONES:  No aggressive appearing lytic or blastic bony lesion. No significant spondylotic changes in the visualized spine. 1. Asymmetric thickening of the right aryepiglottic fold may reflect underlying edema and supraglottitis, however underlying mass not excluded. In addition, there is a suspected vocal cord polyp protruding into the larynx. Direct visual inspection is recommended. 2. No cervical lymphadenopathy. 3. Multinodular thyroid gland again noted with a dominant left 16 mm nodule. Nonemergent thyroid ultrasound should be considered. XR CHEST PORTABLE    Result Date: 11/2/2021  EXAMINATION: ONE XRAY VIEW OF THE CHEST 11/2/2021 8:29 pm COMPARISON: 10/20/2021 HISTORY: ORDERING SYSTEM PROVIDED HISTORY: SOB TECHNOLOGIST PROVIDED HISTORY: Reason for exam:->SOB Reason for Exam: sob Acuity: Unknown Type of Exam: Unknown FINDINGS: Right jugular tunneled venous catheter terminates over the right atrium. Cardiac silhouette is mildly enlarged. Pulmonary vessels are normal in caliber. There is no focal airspace disease, pleural effusion or pneumothorax. No acute cardiopulmonary disease. Stable enlargement of the cardiac silhouette.      XR CHEST PORTABLE    Result Date: 10/20/2021  EXAMINATION: ONE XRAY VIEW OF THE CHEST 10/20/2021 10:35 am COMPARISON: 10/16/2021 HISTORY: ORDERING SYSTEM PROVIDED HISTORY: sob TECHNOLOGIST PROVIDED HISTORY: Reason for exam:->sob Reason for Exam: Shortness of Breath (in by EMS from home was d/c yesterday after being inpatient for 3 days, patient states it was related to sob from missing dialysis, patient reporting she has a severe panic disorder and when she cant breath she panics and it causes this response ) Acuity: Acute Type of Exam: Initial FINDINGS: Right-sided central venous catheter remains in place. The lungs are without acute focal process. There is no effusion or pneumothorax. The cardiomediastinal silhouette is stable. The osseous structures are stable. No acute process. Stable cardiomegaly     XR CHEST PORTABLE    Result Date: 10/16/2021  EXAMINATION: ONE XRAY VIEW OF THE CHEST 10/16/2021 7:06 pm COMPARISON: None. HISTORY: ORDERING SYSTEM PROVIDED HISTORY: SOB TECHNOLOGIST PROVIDED HISTORY: Reason for exam:->SOB Reason for Exam: SOB Acuity: Acute Type of Exam: Initial FINDINGS: There is a right subclavian catheter with its tip in the right atrium. The cardiomediastinal silhouette is mildly enlarged. There is mild pulmonary vascular congestion. There is no pleural effusion. There is no pneumothorax. There is no acute osseous abnormality. Mild pulmonary vascular congestion. Mild cardiomegaly. CT CHEST PULMONARY EMBOLISM W CONTRAST    Result Date: 10/16/2021  EXAMINATION: CTA OF THE CHEST 10/16/2021 8:43 pm TECHNIQUE: CTA of the chest was performed after the administration of intravenous contrast.  Multiplanar reformatted images are provided for review. MIP images are provided for review. Dose modulation, iterative reconstruction, and/or weight based adjustment of the mA/kV was utilized to reduce the radiation dose to as low as reasonably achievable. COMPARISON: None.  HISTORY: ORDERING SYSTEM PROVIDED HISTORY: pe- dialysis patient - okay to contrast TECHNOLOGIST PROVIDED HISTORY: Reason for exam:->pe- dialysis patient - okay to contrast Decision Support Exception - unselect if not a suspected or confirmed emergency medical condition->Emergency Medical Condition (MA) Reason for Exam: Shortness of Breath (SOB with audbile wheezes for over one week. Denies pulmonary hx or exposure to recent illness; quit smoking 8 weeks ago & has anxiety. Reports was in the ICU for unknown reason & was placed on vent in August 2021; seen on 10/4 & needed blood transfusion.  ) FINDINGS: Pulmonary Arteries: Pulmonary arteries are adequately opacified for evaluation. No evidence of intraluminal filling defect to suggest pulmonary embolism. Main pulmonary artery is normal in caliber. Mediastinum: The heart is enlarged. The thoracic aorta and proximal great vessels are patent and of normal caliber. No pericardial effusion. No enlarged or suspicious axillary, hilar, or mediastinal lymphadenopathy. The thyroid gland is mildly enlarged and somewhat heterogeneous. The right jugular vein hemodialysis catheter ends in the upper right atrium. Lungs/pleura: The trachea and mainstem bronchi are widely patent. There is interlobular septal thickening with diffuse bilateral lower lobe airspace disease and ground-glass opacity. No cavitary lesions. No discrete pulmonary nodule or mass. Moderate right and small left pleural effusions. No pericardial effusion. No pneumothorax. Soft Tissues/Bones: Degenerative changes are present throughout the thoracic spine. Upper Abdomen: The visualized upper abdomen is unremarkable. No evidence of pulmonary embolism or acute thoracic aortic abnormality. Cardiomegaly with moderate pulmonary vascular congestive change, moderate right, and small left pleural effusions. Minimal superimposed bibasilar atelectasis. No significant pericardial effusion.        Lab Results   Component Value Date

## 2021-11-05 NOTE — PROGRESS NOTES
Pt educated of benefits of home health care/ pt reports wanting to do outpatient physical therapy not home health care. CM notified.

## 2021-11-05 NOTE — CARE COORDINATION
Discharge Planning Assessment  RN discharge planner  to discuss reason for admission, current living situation, and potential needs at the time of discharge    Demographics/Insurance verified Yes- [employer]    Current type of dwelling: ground level Condo . Patient from ECF/ confirmed with:  N/A    Living arrangements: Lives with spouse     Level of function/Support: independent with ADLs, poor sight   Spouse supports as needed    PCP:  NISHA Matthew    Last Visit to PCP:  1 week ago    DME: has a walker     Active with any community resources/agencies/skilled home care:  Has outpatient PT    Outpatient HD; [facility]  [place]  On  MWF at 12.15 pm  Spouse transports her to & from HD treatments. Medication compliance issues: Denies - uses [facility] [employer] [address] [employer] issues that could impact healthcare:  No      Tentative discharge plan: home with possible outpatient PT      Discussed and provided facilities of choice if transition to a skilled nursing facility is required at the time of discharge      Discussed with patient and/or family that on the day of discharge home tentative time of discharge will be between 10 AM and noon. Transportation at the time of discharge: spouse.

## 2021-11-08 NOTE — DISCHARGE SUMMARY
[facility] -- Physician Discharge Summary     Matteo Lang  1975  MRN: [id number]    Admit Date: 11/2/2021  Discharge Date: 11/5/2021  6:10 PM    Attending MD: Josie att. providers found  Discharging MD: Susu Crockett MD  PCP: Bill Smith [address] [facility] / [address] [phone]    Admission Diagnosis: General weakness [R53.1]  ESRD (end stage renal disease) ([facility] [N18.6]  Elevated troponin [R77.8]  ESRD on dialysis ([facility] [N18.6, Z99.2]  DISCHARGE DIAGNOSIS: same    Full Hospital Problem List:  Active Hospital Problems    Diagnosis Date Noted    ESRD (end stage renal disease) ([facility] [N18.6] 10/04/2021    HTN (hypertension), benign [I10]     History of medication noncompliance [Z91.14]     Anemia [D64.9] 10/05/2013    Type 2 diabetes mellitus, with long-term current use of insulin ([facility] [E11.9, Z79.4]     Mixed hyperlipidemia [E78.2]            Hospital Course:  55 y.o. female who presents evaluation of generalized weakness and frequent falls.  Patient states that she had a 3 controlled falls this evening.  States that she had slid out of the bed but then could not get back up.  She denies hitting her head or any loss of consciousness.  States that she just feels generally weak.  No focal weakness.  No dizziness/lightheadedness, visual disturbances, facial asymmetry, speech difficulty or syncope.  She denies headache.  No neck or back pain.  She denies any injury from the incident.  She does have history of dialysis, Monday, Wednesday and Friday. 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     She has had multiple recent admits  SNF may be necessary as it is becoming clear she is unable to care for herself at home  Pt/ot asked to see  Surprisingly, they recommend home discharge  Pt is set up for home discharge after running on HD here  Asked to keep her usual HD schedule on d/c  Home care set up    Consults made during Hospitalization:  [facility]    Treatment team at time of Discharge: Treatment Team: Consulting Physician: Alex Sheets MD; Consulting Physician: Mariana De La Garza MD; : Agustin Beach, HAYLEE; Utilization Reviewer: Meghann Burt, RN; Utilization Reviewer: Rich Jaime, RN; Consulting Physician: Luigi Callahan MD; Registered Nurse: Kp Goodman, HAYLEE; Nursing Student: Scott Morales; Occupational Therapist: Danay Quintanilla OT    Imaging Results:  XR NECK SOFT TISSUE    Result Date: 10/20/2021  EXAMINATION: TWO XRAY VIEWS OF THE NECK SOFT TISSUES 10/20/2021 10:35 am COMPARISON: 10/16/2021 HISTORY: ORDERING SYSTEM PROVIDED HISTORY: stridor TECHNOLOGIST PROVIDED HISTORY: Reason for exam:->stridor Reason for Exam: Shortness of Breath (in by EMS from home was d/c yesterday after being inpatient for 3 days, patient states it was related to sob from missing dialysis, patient reporting she has a severe panic disorder and when she cant breath she panics and it causes this response ) Acuity: Acute Type of Exam: Initial FINDINGS: Epiglottis again appears normal.  No foreign body. No significant soft tissue swelling. Mild narrowing of the glottis again noted. Mild narrowing the glottis again noted Otherwise, unremarkable soft tissues of the neck     XR NECK SOFT TISSUE    Result Date: 10/16/2021  EXAMINATION: TWO XRAY VIEWS OF THE NECK SOFT TISSUES 10/16/2021 10:32 pm COMPARISON: None. HISTORY: ORDERING SYSTEM PROVIDED HISTORY: stridor TECHNOLOGIST PROVIDED HISTORY: Reason for exam:->stridor FINDINGS: The upper airway is patent. The retropharyngeal soft tissues and epiglottis are not thickened. There is narrowing at the glottis. No radiopaque foreign bodies are seen over the airway.      Retropharyngeal soft tissues and epiglottis appear normal.  There is some narrowing at the glottis. CT SOFT TISSUE NECK W CONTRAST    Result Date: 10/20/2021  EXAMINATION: CT OF THE NECK SOFT TISSUE WITH CONTRAST  10/20/2021 TECHNIQUE: CT of the neck was performed with the administration of intravenous contrast. Multiplanar reformatted images are provided for review. Dose modulation, iterative reconstruction, and/or weight based adjustment of the mA/kV was utilized to reduce the radiation dose to as low as reasonably achievable. COMPARISON: CTA head and neck 08/27/2020 HISTORY: ORDERING SYSTEM PROVIDED HISTORY: epiglottitis TECHNOLOGIST PROVIDED HISTORY: Reason for exam:->epiglottitis Decision Support Exception - unselect if not a suspected or confirmed emergency medical condition->Emergency Medical Condition (MA) Reason for Exam: headache Acuity: Unknown Type of Exam: Unknown FINDINGS: PHARYNX/LARYNX: Nasopharynx and oropharynx appear normal.Parapharyngeal tissue planes are preserved. Oral cavity and floor of mouth appear normal within constraints of artifact from dental amalgam. spaces appear normal.The hypopharynx and infraglottic trachea are unremarkable. There is asymmetric thickening of the right aryepiglottic fold. There is a suspected vocal cord polyp protruding into the larynx (series 2, image 64 and series 602, image 77). Imaged upper esophagus is unremarkable. SALIVARY GLANDS/THYROID:The parotid and submandibular glands appear unremarkable. Again seen is a multinodular thyroid with a dominant 16 mm left thyroid lobe nodule. LYMPH NODES: No cervical or supraclavicular lymphadenopathy is seen. SOFT TISSUES: No appreciable soft tissue swelling. No mass within carotid spaces. Patent extracranial carotid systems and internal jugular veins bilaterally. BRAIN/ORBITS/SINUSES: No abnormal intracranial enhancement, mass effect, or hydrocephalus within the imaged brain. Orbital soft tissue planes are preserved. Imaged paranasal sinuses are clear.Mastoid air cells and middle ear cavities are clear. LUNG APICES/SUPERIOR MEDIASTINUM:Imaged lung apices are clear of focal consolidation or mass. Partially imaged right IJ central venous catheter. BONES:  No aggressive appearing lytic or blastic bony lesion. No significant spondylotic changes in the visualized spine. 1. Asymmetric thickening of the right aryepiglottic fold may reflect underlying edema and supraglottitis, however underlying mass not excluded. In addition, there is a suspected vocal cord polyp protruding into the larynx. Direct visual inspection is recommended. 2. No cervical lymphadenopathy. 3. Multinodular thyroid gland again noted with a dominant left 16 mm nodule. Nonemergent thyroid ultrasound should be considered. XR CHEST PORTABLE    Result Date: 11/2/2021  EXAMINATION: ONE XRAY VIEW OF THE CHEST 11/2/2021 8:29 pm COMPARISON: 10/20/2021 HISTORY: ORDERING SYSTEM PROVIDED HISTORY: SOB TECHNOLOGIST PROVIDED HISTORY: Reason for exam:->SOB Reason for Exam: sob Acuity: Unknown Type of Exam: Unknown FINDINGS: Right jugular tunneled venous catheter terminates over the right atrium. Cardiac silhouette is mildly enlarged. Pulmonary vessels are normal in caliber. There is no focal airspace disease, pleural effusion or pneumothorax. No acute cardiopulmonary disease. Stable enlargement of the cardiac silhouette.      XR CHEST PORTABLE    Result Date: 10/20/2021  EXAMINATION: ONE XRAY VIEW OF THE CHEST 10/20/2021 10:35 am COMPARISON: 10/16/2021 HISTORY: ORDERING SYSTEM PROVIDED HISTORY: sob TECHNOLOGIST PROVIDED HISTORY: Reason for exam:->sob Reason for Exam: Shortness of Breath (in by EMS from home was d/c yesterday after being inpatient for 3 days, patient states it was related to sob from missing dialysis, patient reporting she has a severe panic disorder and when she cant breath she panics and it causes this response ) Acuity: Acute Type of Exam: Initial FINDINGS: Right-sided central venous catheter remains in place. The lungs are without acute focal process. There is no effusion or pneumothorax. The cardiomediastinal silhouette is stable. The osseous structures are stable. No acute process. Stable cardiomegaly     XR CHEST PORTABLE    Result Date: 10/16/2021  EXAMINATION: ONE XRAY VIEW OF THE CHEST 10/16/2021 7:06 pm COMPARISON: None. HISTORY: ORDERING SYSTEM PROVIDED HISTORY: SOB TECHNOLOGIST PROVIDED HISTORY: Reason for exam:->SOB Reason for Exam: SOB Acuity: Acute Type of Exam: Initial FINDINGS: There is a right subclavian catheter with its tip in the right atrium. The cardiomediastinal silhouette is mildly enlarged. There is mild pulmonary vascular congestion. There is no pleural effusion. There is no pneumothorax. There is no acute osseous abnormality. Mild pulmonary vascular congestion. Mild cardiomegaly. CT CHEST PULMONARY EMBOLISM W CONTRAST    Result Date: 10/16/2021  EXAMINATION: CTA OF THE CHEST 10/16/2021 8:43 pm TECHNIQUE: CTA of the chest was performed after the administration of intravenous contrast.  Multiplanar reformatted images are provided for review. MIP images are provided for review. Dose modulation, iterative reconstruction, and/or weight based adjustment of the mA/kV was utilized to reduce the radiation dose to as low as reasonably achievable. COMPARISON: None. HISTORY: ORDERING SYSTEM PROVIDED HISTORY: pe- dialysis patient - okay to contrast TECHNOLOGIST PROVIDED HISTORY: Reason for exam:->pe- dialysis patient - okay to contrast Decision Support Exception - unselect if not a suspected or confirmed emergency medical condition->Emergency Medical Condition (MA) Reason for Exam: Shortness of Breath (SOB with audbile wheezes for over one week. Denies pulmonary hx or exposure to recent illness; quit smoking 8 weeks ago & has anxiety.   Reports was in the ICU for unknown reason & was placed on vent in August 2021; seen on 10/4 & needed blood morning for 3 days. Notes to patient: Use: anxiety  Side effects: depression or low energy      aspirin 81 MG EC tablet  Take 1 tablet by mouth daily  Notes to patient: Used to prevent platelet aggregation   May cause bleeding     atorvastatin 40 MG tablet  Commonly known as: LIPITOR  Take 1 tablet by mouth nightly  Notes to patient: Use: lower bad cholesterol  Side effects: headache, muscle pain, constipation, diarrhea     benzonatate 200 MG capsule  Commonly known as: TESSALON  Take 1 capsule by mouth every 8 hours as needed for Cough     Breathe Right Extra Strength Strp  1 strip by Does not apply route nightly as needed (nasal congestion)     cloNIDine 0.2 MG/24HR Ptwk  Commonly known as: CATAPRES  Place 1 patch onto the skin once a week  Notes to patient: Use: antihypertension   Side effects: constipation, dizziness, dry mouth     Dulaglutide 0.75 MG/0.5ML Sopn  Inject 0.75 mg into the skin once a week     fluvoxaMINE 50 MG tablet  Commonly known as: LUVOX  Take 1 tablet by mouth nightly     glucose 40 % Gel  Commonly known as: GLUTOSE  Take 37.5 mLs by mouth as needed (low blood sugar)     glycopyrrolate-formoterol 9-4.8 MCG/ACT Aero  Commonly known as: Bevespi Aerosphere  Inhale 2 puffs into the lungs 2 times daily     insulin lispro (1 Unit Dial) 100 UNIT/ML Sopn  Inject 0-6 Units into the skin 3 times daily (with meals) **Corrective Low Dose Algorithm**  Glucose: Dose:               No Insulin  140-199 1 Unit  200-249 2 Units  250-299 3 Units  300-349 4 Units  350-399 5 Units  Over 399 6 Units     Insulin Pen Needle 29G X 12.7MM Misc  1 each by Does not apply route daily     Lantus SoloStar 100 UNIT/ML injection pen  Generic drug: insulin glargine     lisinopril 40 MG tablet  Commonly known as: PRINIVIL;ZESTRIL     pregabalin 75 MG capsule  Commonly known as: LYRICA  Take 1 capsule by mouth nightly for 3 days. Notes to patient: Uses:   it is used to help control certain kinds of seizures.     It is used to treat painful nerve diseases. May cause: fever, chills, muscle weakness, allergic reaction     Pulse Oximeter Misc  1 each by Does not apply route every 6-8 hours as needed (shortness of breath)     spironolactone 50 MG tablet  Commonly known as: ALDACTONE  Take 1 tablet by mouth daily  Notes to patient: Use: treat heart failure, fluid retention, lower blood pressure. Side effects: frequent urination, weakness, muscle cramps, increased sensitivity to light, nausea and dizziness. * This list has 2 medication(s) that are the same as other medications prescribed for you. Read the directions carefully, and ask your doctor or other care provider to review them with you. ASK your doctor about these medications    predniSONE 20 MG tablet  Commonly known as: DELTASONE  Take 2 tablets by mouth daily for 5 days  Ask about: Should I take this medication? Where to Get Your Medications      You can get these medications from any pharmacy    Bring a paper prescription for each of these medications  · ALPRAZolam 2 MG extended release tablet  · pregabalin 75 MG capsule           Allergies: Allergies   Allergen Reactions    Amoxicillin Hives, Itching and Other (See Comments)     Tolerates cephalosporins  Patient tolerating cefazolin (ANCEF) as of October 11, 2018      Levofloxacin Anaphylaxis    Vancomycin Anaphylaxis, Hives and Shortness Of Breath    Tape [Adhesive Tape] Other (See Comments)     Paper tape turns skin bright red. Plastic tape okay. Follow up Instructions: Follow-up with PCP: Mimi Blum in 2 wk .       Total time spent on day of discharge including face-to-face visit, examination, documentation, counseling, preparation of discharge plans and followup, and discharge medicine reconciliation and presciptions is 36 minutes    Signed:  Joesph Dallas MD  11/8/2021

## 2021-11-28 NOTE — PROGRESS NOTES
Jed Orellana   55 y.o. female   1975    HPI:    Patient was last seen by me on 11/1/2021. Reason(s) for visit:   Chief Complaint   Patient presents with    Medication Refill    Panic Attack    Anxiety    Diabetes       COPD:   -Patient was advised to continue Bevespi (incorrectly stated Alannah Mcclure in other note)  -She was prescribed nebulizer  -Her chronic cough has almost resolved and mainly overnight. Epiglottic lesion:  -Seeing ENT next week    Generalized anxiety with panic attacks:  -Meds tried: Sertraline, Fluoxetine, Fluvoxamine     Updates:  Patient was prescribed Fluvoxamine for treatment of HOLGER and OCD. She was also prescribed Alprazolam 2 mg ER once daily. She stated similar findings with Fluoxetine. No side effects with Fluvoxamine. She was well pleased with Alprazolam.  Stated not a \"single panic attack\" since LOV.  stated that she's \"more balanced and even\" regarding her \"mood swings. \"    Patient reported of some stress related to multiple family members who contracted [phone] including her . Patient is not vaccinated. ESRD:  -Patient on hemodialysis and managed by nephrologist.    Type II DM:  -Patient stated that Trulicity has helped with cravings. -FBS (avg): 140's and lower.   -Patient stated hardly using rapid acting insulin at all - 1-2 units at a time. Allergies   Allergen Reactions    Amoxicillin Hives, Itching and Other (See Comments)     Tolerates cephalosporins  Patient tolerating cefazolin (ANCEF) as of October 11, 2018      Levofloxacin Anaphylaxis    Vancomycin Anaphylaxis, Hives and Shortness Of Breath    Tape [Adhesive Tape] Other (See Comments)     Paper tape turns skin bright red. Plastic tape okay.         Current Outpatient Medications on File Prior to Visit   Medication Sig Dispense Refill    amLODIPine (NORVASC) 10 MG tablet       hydrOXYzine (ATARAX) 25 MG tablet       propranolol (INDERAL LA) 80 MG extended release  Diabetes Paternal Grandmother     Alcohol Abuse Paternal Grandfather     Diabetes Paternal Aunt     Diabetes Paternal Uncle        Social History     Tobacco Use    Smoking status: Former Smoker     Packs/day: 1.00     Types: Cigarettes    Smokeless tobacco: Never Used    Tobacco comment: Quit in August 2021   Substance Use Topics    Alcohol use: No     Comment: Very Rare        Lab Results   Component Value Date    WBC 13.2 (H) 11/05/2021    HGB 10.0 (L) 11/05/2021    HCT 31.6 (L) 11/05/2021    MCV 84.3 11/05/2021     11/05/2021       Chemistry        Component Value Date/Time     (L) 11/05/2021 0553    K 4.2 11/05/2021 0553    K 5.1 11/03/2021 0551    CL 97 (L) 11/05/2021 0553    CO2 25 11/05/2021 0553    BUN 54 (H) 11/05/2021 0553    CREATININE 5.2 (HH) 11/05/2021 0553        Component Value Date/Time    CALCIUM 8.7 11/05/2021 0553    ALKPHOS 125 11/03/2021 0551    AST 19 11/03/2021 0551    ALT 10 11/03/2021 0551    BILITOT 0.3 11/03/2021 0551          Lab Results   Component Value Date    ALT 10 11/03/2021    AST 19 11/03/2021    ALKPHOS 125 11/03/2021    BILITOT 0.3 11/03/2021     Lab Results   Component Value Date    LABA1C 5.2 11/02/2021     Lab Results   Component Value Date    .5 11/02/2021       Review of Systems   Constitutional: Negative for activity change, appetite change, fatigue, fever and unexpected weight change. HENT: Negative for congestion, rhinorrhea, sinus pressure and trouble swallowing. Respiratory: Positive for cough. Negative for chest tightness, shortness of breath and wheezing. Cardiovascular: Negative for chest pain, palpitations and leg swelling. Gastrointestinal: Negative for abdominal distention, abdominal pain, blood in stool, constipation, diarrhea, nausea and vomiting. Genitourinary: Negative for dysuria, frequency and hematuria. Musculoskeletal: Negative for arthralgias and back pain. Skin: Negative for rash.    Neurological: Negative or wound. Neurological:      General: No focal deficit present. Mental Status: She is alert. Mental status is at baseline. GCS: GCS eye subscore is 4. GCS verbal subscore is 5. GCS motor subscore is 6. Cranial Nerves: No cranial nerve deficit, dysarthria or facial asymmetry. Motor: No weakness, tremor, atrophy or seizure activity. Coordination: Coordination normal.      Gait: Gait is intact. Psychiatric:         Attention and Perception: Attention and perception normal.         Mood and Affect: Mood and affect normal.         Speech: Speech normal.         Behavior: Behavior normal. Behavior is cooperative. Thought Content: Thought content normal.       Assessment/Plan:   Ryland Stewart was seen today for medication refill. Diagnoses and all orders for this visit:    Generalized anxiety disorder with panic attacks  Comments:  I discussed with patient about the potential benefits of GeneSight testing. Patient was informed that it is a \"genetics test\" that provides a comprehensive report on medications used primarily for treatment of anxiety/depression, mood disorders as well as conditions regarding sleep disturbances and ADHD. The main potential benefit with this test is that it may help provide a list of medication(s) that may most likely cause side effects, medication(s) that may be overall ineffective, and medication(s) that may have a increase risk of drug-to-drug interactions. The report will also include a list of medication(s) (of which there are several to choose from) that may provide notable positive benefit as well as those that carry overall lower risk of having side effect(s). Based on all this, GeneSight testing could essentially help with minimizing the trial and error with selecting medications.   Therefore this test would be used as a tool or guide combined with my own judgement and clinical experience in selecting the appropriate medical therapy for the individual patient. After reviewing the potential benefits of having the GeneSight test, the patient understood my recommendation for testing and offered their consent for treatment. Overall improved on current medical regiment. Orders:  -     ALPRAZolam (XANAX XR) 3 MG extended release tablet; Take 1 tablet by mouth every morning for 30 days. -     fluvoxaMINE (LUVOX) 100 MG tablet; Take 1 tablet by mouth nightly    Obsessive-compulsive disorder, unspecified type  -     fluvoxaMINE (LUVOX) 100 MG tablet; Take 1 tablet by mouth nightly    Chronic obstructive pulmonary disease, unspecified COPD type ([facility]  Comments:  Continue Bevespi. Orders:  -     albuterol sulfate  (90 Base) MCG/ACT inhaler; Inhale 2 puffs into the lungs every 6 hours as needed for Wheezing or Shortness of Breath    Type 2 diabetes mellitus with other specified complication, with long-term current use of insulin ([facility])  Comments: Will wean off insulin (20 units of Lantus down to 10 units) as we increase Trulicity as tolerated. Orders:  -     Dulaglutide 1.5 MG/0.5ML SOPN; Inject 1.5 mg into the skin once a week    Epiglottic lesion  Comments:  Advised to follow up with ENT. Orders:  -     benzonatate (TESSALON) 200 MG capsule; Take 1 capsule by mouth every 8 hours as needed for Cough    Educated about 2019 novel coronavirus infection  Comments:  Advised to get COVID-19 vaccine. Immunization due  -     PNEUMOVAX 23 subcutaneous/IM (Pneumococcal polysaccharide vaccine 23-valent >= 1yo)  -     Tdap (age 6y and older) IM ([facility])      I reviewed the plan of care with the patient. Patient acknowledged understanding and agreed with plan of care overall.     Medications Discontinued During This Encounter   Medication Reason    Dulaglutide 0.75 MG/0.5ML SOPN DOSE ADJUSTMENT    albuterol sulfate  (90 Base) MCG/ACT inhaler REORDER    fluvoxaMINE (LUVOX) 50 MG tablet DOSE ADJUSTMENT    benzonatate (TESSALON) 200 MG capsule REORDER    ALPRAZolam (XANAX XR) 2 MG extended release tablet REORDER        General information on medications:  -When it comes to medications, whether with starting or adding a new medication or increasing the dose of a current medication, the benefits and risks have to always be considered and weighed over, especially if one is taking other medications as well. -There are no medications that have no side effects and that there is always a risk involved with taking a medication.    -If a side effect were to occur with starting a new medication or with increasing the dose of a current medication that either the medication can be totally discontinued altogether or simply decrease the dose of it and if this would be the case a follow-up appointment would be deemed necessary.    -The drug allergy list will then be updated with the corresponding side effect(s) if it's deemed to be a true 'drug allergy'. -The most common adverse effects of medication(s) were addressed at today's visit.    -Lastly, the coverage status of a medication may vary from insurance to insurance and the only way to verify if the medication is covered is to send an actual prescription in.    -The drug formulary of each insurance changes without any warning or notification to the healthcare provider let alone the pharmacy.  -The cost of medications vary from insurance to insurance and the cost is always subject to change just like the drug formulary. Follow-up: Return in about 2 weeks (around 12/14/2021) for Qnaryight result. .     Patient was informed that if his or her symptoms worsen to follow up with me sooner or go to the nearest ER if the symptoms are very significant and warrant higher level of care.     Regarding my note:  -This note was composed (by me only and not with assistance via a scribe) to the best of my knowledge and recollection of the encounter with the patient using one of my own customized note templates utilizing a combination of typing and dictating with the [address] [place] [address] speech recognition software. As a result, the note may possibly contain various errors (e.g. spelling, grammar, and non-sensible words/phrases/statements) despite reviewing the note prior to signing it for completion. Damon Corral M.D.   [address] [place] [address]    Electronically signed by Shelia Montana M.D. on 11/30/2021 at 6:49 PM.

## 2021-11-30 ENCOUNTER — OFFICE VISIT (OUTPATIENT)
Dept: FAMILY MEDICINE CLINIC | Age: 46
End: 2021-11-30
Payer: COMMERCIAL

## 2021-11-30 VITALS
SYSTOLIC BLOOD PRESSURE: 136 MMHG | TEMPERATURE: 97.6 F | BODY MASS INDEX: 31.04 KG/M2 | OXYGEN SATURATION: 96 % | DIASTOLIC BLOOD PRESSURE: 80 MMHG | WEIGHT: 198.2 LBS | HEART RATE: 90 BPM

## 2021-11-30 DIAGNOSIS — F41.0 GENERALIZED ANXIETY DISORDER WITH PANIC ATTACKS: Primary | ICD-10-CM

## 2021-11-30 DIAGNOSIS — J38.7 EPIGLOTTIC LESION: ICD-10-CM

## 2021-11-30 DIAGNOSIS — Z71.89 EDUCATED ABOUT 2019 NOVEL CORONAVIRUS INFECTION: ICD-10-CM

## 2021-11-30 DIAGNOSIS — J44.9 CHRONIC OBSTRUCTIVE PULMONARY DISEASE, UNSPECIFIED COPD TYPE (HCC): ICD-10-CM

## 2021-11-30 DIAGNOSIS — F42.9 OBSESSIVE-COMPULSIVE DISORDER, UNSPECIFIED TYPE: ICD-10-CM

## 2021-11-30 DIAGNOSIS — Z79.4 TYPE 2 DIABETES MELLITUS WITH OTHER SPECIFIED COMPLICATION, WITH LONG-TERM CURRENT USE OF INSULIN (HCC): ICD-10-CM

## 2021-11-30 DIAGNOSIS — Z23 IMMUNIZATION DUE: ICD-10-CM

## 2021-11-30 DIAGNOSIS — F41.1 GENERALIZED ANXIETY DISORDER WITH PANIC ATTACKS: Primary | ICD-10-CM

## 2021-11-30 DIAGNOSIS — E11.69 TYPE 2 DIABETES MELLITUS WITH OTHER SPECIFIED COMPLICATION, WITH LONG-TERM CURRENT USE OF INSULIN (HCC): ICD-10-CM

## 2021-11-30 PROCEDURE — 90472 IMMUNIZATION ADMIN EACH ADD: CPT | Performed by: FAMILY MEDICINE

## 2021-11-30 PROCEDURE — 1111F DSCHRG MED/CURRENT MED MERGE: CPT | Performed by: FAMILY MEDICINE

## 2021-11-30 PROCEDURE — 90471 IMMUNIZATION ADMIN: CPT | Performed by: FAMILY MEDICINE

## 2021-11-30 PROCEDURE — G8484 FLU IMMUNIZE NO ADMIN: HCPCS | Performed by: FAMILY MEDICINE

## 2021-11-30 PROCEDURE — G8417 CALC BMI ABV UP PARAM F/U: HCPCS | Performed by: FAMILY MEDICINE

## 2021-11-30 PROCEDURE — G8926 SPIRO NO PERF OR DOC: HCPCS | Performed by: FAMILY MEDICINE

## 2021-11-30 PROCEDURE — 3044F HG A1C LEVEL LT 7.0%: CPT | Performed by: FAMILY MEDICINE

## 2021-11-30 PROCEDURE — 2022F DILAT RTA XM EVC RTNOPTHY: CPT | Performed by: FAMILY MEDICINE

## 2021-11-30 PROCEDURE — 99214 OFFICE O/P EST MOD 30 MIN: CPT | Performed by: FAMILY MEDICINE

## 2021-11-30 PROCEDURE — 90732 PPSV23 VACC 2 YRS+ SUBQ/IM: CPT | Performed by: FAMILY MEDICINE

## 2021-11-30 PROCEDURE — 1036F TOBACCO NON-USER: CPT | Performed by: FAMILY MEDICINE

## 2021-11-30 PROCEDURE — 3023F SPIROM DOC REV: CPT | Performed by: FAMILY MEDICINE

## 2021-11-30 PROCEDURE — G8427 DOCREV CUR MEDS BY ELIG CLIN: HCPCS | Performed by: FAMILY MEDICINE

## 2021-11-30 PROCEDURE — 90715 TDAP VACCINE 7 YRS/> IM: CPT | Performed by: FAMILY MEDICINE

## 2021-11-30 RX ORDER — BENZONATATE 200 MG/1
200 CAPSULE ORAL EVERY 8 HOURS PRN
Qty: 15 CAPSULE | Refills: 1 | Status: ON HOLD | OUTPATIENT
Start: 2021-11-30 | End: 2022-03-02

## 2021-11-30 RX ORDER — ALBUTEROL SULFATE 90 UG/1
2 AEROSOL, METERED RESPIRATORY (INHALATION) EVERY 6 HOURS PRN
Qty: 18 G | Refills: 1 | Status: SHIPPED | OUTPATIENT
Start: 2021-11-30 | End: 2022-07-21 | Stop reason: SDUPTHER

## 2021-11-30 RX ORDER — PROPRANOLOL HYDROCHLORIDE 80 MG/1
CAPSULE, EXTENDED RELEASE ORAL
COMMUNITY
Start: 2021-11-11 | End: 2021-12-10

## 2021-11-30 RX ORDER — AMLODIPINE BESYLATE 10 MG/1
TABLET ORAL
Status: ON HOLD | COMMUNITY
Start: 2021-11-17 | End: 2022-01-21 | Stop reason: HOSPADM

## 2021-11-30 RX ORDER — HYDROXYZINE HYDROCHLORIDE 25 MG/1
TABLET, FILM COATED ORAL
Status: ON HOLD | COMMUNITY
Start: 2021-10-14 | End: 2022-03-07 | Stop reason: HOSPADM

## 2021-11-30 RX ORDER — ALPRAZOLAM 3 MG/1
3 TABLET, EXTENDED RELEASE ORAL EVERY MORNING
Qty: 30 TABLET | Refills: 0 | Status: SHIPPED | OUTPATIENT
Start: 2021-11-30 | End: 2022-01-03

## 2021-11-30 RX ORDER — FLUVOXAMINE MALEATE 100 MG
100 TABLET ORAL NIGHTLY
Qty: 30 TABLET | Refills: 1 | Status: SHIPPED | OUTPATIENT
Start: 2021-11-30 | End: 2022-03-17 | Stop reason: DRUGHIGH

## 2021-11-30 ASSESSMENT — ENCOUNTER SYMPTOMS
SHORTNESS OF BREATH: 0
NAUSEA: 0
TROUBLE SWALLOWING: 0
ABDOMINAL PAIN: 0
COUGH: 1
VOMITING: 0
SINUS PRESSURE: 0
BLOOD IN STOOL: 0
CONSTIPATION: 0
WHEEZING: 0
CHEST TIGHTNESS: 0
BACK PAIN: 0
RHINORRHEA: 0
DIARRHEA: 0
ABDOMINAL DISTENTION: 0

## 2021-12-12 NOTE — PROGRESS NOTES
Sachi Resides   55 y.o. female   1975    HPI:    Patient was last seen by me on 11/30/2021. During our last office visit:   -Increased Luvox from 50 to 100 mg daily  -Increased Alprazolam ER from 2 to 3 mg daily    Reason(s) for visit:   Chief Complaint   Patient presents with    Follow-up     GeneSight       Mixed anxiety and depression:  -Sleeping better with higher dose Alprazolam.  -Less anxious/stressed.  -Less OCD. - has noticed that she might be \"too more relaxed\" and has been having falling spells.  -Patient stated that she has intra-scapular pain and is intense, which is triggered by bending forward. She stated that she has trouble standing up straight. She stated that the back pain resolves \"immediately\" once she sits down. Patient is here today to discuss their GeneSight results. Questions asked of patient to determine, which medication(s) to select:  -Issues with fatigue/decreased stamina? Yes  -Issues with sleep? Better with new medical regiment.  -Issues with anhedonia? No.  -Issues with mood swings? Improved with control regiment.  -Issues with frequent headaches/migraines? No.  issues recently. Controlled on Propranolol.  -Issues with chronic pain/fibromyalgia/neuropathy? Yes  -Issues with appetite?  -Issues with OCD tendencies? Yes  -History of ADHD and/or issues overall with focusing/concentration?  -History of smoking?  None  -Anxiety/depression (%) - more anxious than depressed, especially since she had a stroke earlier in the year.  -Satisfaction with current medical regiment:  -Meds tried: Sertraline, Fluoxetine, Fluvoxamine, Alprazolam, Lorazepam, Clonazepam    Last PDMP [place] as Reviewed [facility]):  Review User Review Instant Review Result   [address] [address], [address] 12/11/2021 11:42 PM Reviewed PDMP [1]         Allergies   Allergen Reactions    Amoxicillin Hives, Itching and Other (See Comments)     Tolerates cephalosporins  Patient tolerating cefazolin (ANCEF) as of October 11, 2018      Levofloxacin Anaphylaxis    Vancomycin Anaphylaxis, Hives and Shortness Of Breath    Tape [Adhesive Tape] Other (See Comments)     Paper tape turns skin bright red. Plastic tape okay. Current Outpatient Medications on File Prior to Visit   Medication Sig Dispense Refill    furosemide (LASIX) 40 MG tablet       spironolactone (ALDACTONE) 50 MG tablet TAKE ONE TABLET BY MOUTH DAILY 30 tablet 0    propranolol (INDERAL LA) 80 MG extended release capsule TAKE ONE CAPSULE BY MOUTH EVERY MORNING 30 capsule 0    pregabalin (LYRICA) 75 MG capsule Take 1 capsule by mouth nightly for 30 doses. 30 capsule 0    amLODIPine (NORVASC) 10 MG tablet       hydrOXYzine (ATARAX) 25 MG tablet       ALPRAZolam (XANAX XR) 3 MG extended release tablet Take 1 tablet by mouth every morning for 30 days. 30 tablet 0    benzonatate (TESSALON) 200 MG capsule Take 1 capsule by mouth every 8 hours as needed for Cough 15 capsule 1    albuterol sulfate  (90 Base) MCG/ACT inhaler Inhale 2 puffs into the lungs every 6 hours as needed for Wheezing or Shortness of Breath 18 g 1    fluvoxaMINE (LUVOX) 100 MG tablet Take 1 tablet by mouth nightly 30 tablet 1    Dulaglutide 1.5 MG/0.5ML SOPN Inject 1.5 mg into the skin once a week 4 pen 1    cloNIDine (CATAPRES) 0.2 MG/24HR PTWK Place 1 patch onto the skin once a week 4 patch 2    Misc.  Devices (PULSE OXIMETER) MISC 1 each by Does not apply route every 6-8 hours as needed (shortness of breath) 1 each 0    Nasal Dilators (BREATHE RIGHT EXTRA STRENGTH) STRP 1 strip by Does not apply route nightly as needed (nasal congestion) 8 strip 2    albuterol sulfate HFA (PROVENTIL HFA) 108 (90 Base) MCG/ACT inhaler Inhale 2 puffs into the lungs every 4 hours as needed for Wheezing 18 g 0    insulin glargine (LANTUS SOLOSTAR) 100 UNIT/ML injection pen Inject 20 Units into the skin every morning       lisinopril (PRINIVIL;ZESTRIL) 40 MG tablet Take 40 mg by mouth daily      125 11/03/2021 0551    AST 19 11/03/2021 0551    ALT 10 11/03/2021 0551    BILITOT 0.3 11/03/2021 0551          Lab Results   Component Value Date    ALT 10 11/03/2021    AST 19 11/03/2021    ALKPHOS 125 11/03/2021    BILITOT 0.3 11/03/2021     Lab Results   Component Value Date    LABA1C 5.2 11/02/2021     Lab Results   Component Value Date    .5 11/02/2021       Review of Systems   Constitutional: Negative for activity change, appetite change, fatigue, fever and unexpected weight change. HENT: Negative for congestion, rhinorrhea, sinus pressure and trouble swallowing. Respiratory: Positive for cough. Negative for chest tightness, shortness of breath and wheezing. Cardiovascular: Negative for chest pain, palpitations and leg swelling. Gastrointestinal: Negative for abdominal distention, abdominal pain, blood in stool, constipation, diarrhea, nausea and vomiting. Genitourinary: Negative for dysuria, frequency and hematuria. Musculoskeletal: Positive for back pain. Negative for arthralgias. Skin: Negative for rash. Neurological: Positive for weakness. Negative for dizziness, light-headedness, numbness and headaches. Psychiatric/Behavioral: Positive for sleep disturbance. The patient is nervous/anxious. Wt Readings from Last 3 Encounters:   12/15/21 194 lb 6.4 oz (88.2 kg)   11/30/21 198 lb 3.2 oz (89.9 kg)   11/05/21 178 lb 2.1 oz (80.8 kg)       BP Readings from Last 3 Encounters:   12/15/21 122/86   11/30/21 136/80   11/05/21 (!) 181/95       Pulse Readings from Last 3 Encounters:   12/15/21 87   11/30/21 90   11/05/21 85       /86   Pulse 87   Temp 97 °F (36.1 °C)   Wt 194 lb 6.4 oz (88.2 kg)   SpO2 99%   BMI 30.45 kg/m²      Physical Exam  Vitals reviewed. Constitutional:       General: She is awake. She is not in acute distress. Appearance: She is obese. She is not ill-appearing or diaphoretic. HENT:      Head: Normocephalic and atraumatic.  No abrasion or masses. Hair is normal.      Right Ear: External ear normal.      Left Ear: External ear normal.      Nose: Nose normal.   Eyes:      General: Lids are normal. Gaze aligned appropriately. No scleral icterus. Right eye: No discharge. Left eye: No discharge. Extraocular Movements: Extraocular movements intact. Conjunctiva/sclera: Conjunctivae normal.   Neck:      Trachea: Phonation normal.   Cardiovascular:      Rate and Rhythm: Normal rate and regular rhythm. Pulmonary:      Effort: Pulmonary effort is normal. No respiratory distress. Breath sounds: No wheezing, rhonchi or rales. Abdominal:      General: Abdomen is flat. There is no distension. Palpations: Abdomen is soft. Musculoskeletal:         General: No deformity. Normal range of motion. Cervical back: Normal range of motion. No erythema. Right lower leg: No edema. Left lower leg: No edema. Skin:     Coloration: Skin is not cyanotic, jaundiced or pale. Findings: No abrasion, abscess, bruising, ecchymosis, erythema, signs of injury, laceration, lesion, petechiae, rash or wound. Neurological:      General: No focal deficit present. Mental Status: She is alert. Mental status is at baseline. GCS: GCS eye subscore is 4. GCS verbal subscore is 5. GCS motor subscore is 6. Cranial Nerves: No cranial nerve deficit, dysarthria or facial asymmetry. Motor: No weakness, tremor, atrophy or seizure activity. Coordination: Coordination normal.      Gait: Gait is intact. Psychiatric:         Attention and Perception: Attention and perception normal.         Mood and Affect: Mood and affect normal.         Speech: Speech normal.         Behavior: Behavior normal. Behavior is cooperative. Thought Content: Thought content normal.        Assessment/Plan:   Duke Rubin was seen today for follow-up.     Diagnoses and all orders for this visit:    Generalized anxiety disorder with panic as well as make one immune or completely prevent from having more issues with anxiety and/or depression. Stress (emotional & physical) is part of everyday life. Orders:  -     buPROPion (WELLBUTRIN XL) 150 MG extended release tablet; Take 1 tablet by mouth every morning    Obsessive-compulsive disorder, unspecified type  Comments: Will continue Luvox. Chronic insomnia    Chronic midline thoracic back pain  Comments:  Recommended wearing back brace. Orders:  -     Heat Wraps (THERMACARE BACK/HIP) MISC; 1 each by Does not apply route daily as needed (back pain)  -     methyl salicylate-menthol (RANDI LEBLANC GREASELESS) 10-15 % CREA; Apply topically 3 times daily as needed for Pain    End-stage renal disease on hemodialysis [facility])  Comments:  Managed by nephrologist.      I reviewed the plan of care with the patient. Patient acknowledged understanding and agreed with plan of care overall. There are no discontinued medications. General information on medications:  -When it comes to medications, whether with starting or adding a new medication or increasing the dose of a current medication, the benefits and risks have to always be considered and weighed over, especially if one is taking other medications as well. -There are no medications that have no side effects and that there is always a risk involved with taking a medication.    -If a side effect were to occur with starting a new medication or with increasing the dose of a current medication that either the medication can be totally discontinued altogether or simply decrease the dose of it and if this would be the case a follow-up appointment would be deemed necessary.    -The drug allergy list will then be updated with the corresponding side effect(s) if it's deemed to be a true 'drug allergy'.     -The most common adverse effects of medication(s) were addressed at today's visit.    -Lastly, the coverage status of a medication may vary from insurance to insurance and the only way to verify if the medication is covered is to send an actual prescription in.    -The drug formulary of each insurance changes without any warning or notification to the healthcare provider let alone the pharmacy.  -The cost of medications vary from insurance to insurance and the cost is always subject to change just like the drug formulary. Follow-up: Return in about 4 weeks (around 1/12/2022) for IP - started on Bupropion XL. Giuliana Doss Patient was informed that if his or her symptoms worsen to follow up with me sooner or go to the nearest ER if the symptoms are very significant and warrant higher level of care. Regarding my note:  -This note was composed (by me only and not with assistance via a scribe) to the best of my knowledge and recollection of the encounter with the patient using one of my own customized note templates utilizing a combination of typing and dictating with the [address] [place] [address] speech recognition software. As a result, the note may possibly contain various errors (e.g. spelling, grammar, and non-sensible words/phrases/statements) despite reviewing the note prior to signing it for completion. Damon La M.D.   [address] [place] [address]    Electronically signed by Dionicio Salter M.D. on 12/15/2021 at 6:30 PM.

## 2021-12-15 ENCOUNTER — OFFICE VISIT (OUTPATIENT)
Dept: FAMILY MEDICINE CLINIC | Age: 46
End: 2021-12-15
Payer: COMMERCIAL

## 2021-12-15 VITALS
HEART RATE: 87 BPM | TEMPERATURE: 97 F | OXYGEN SATURATION: 99 % | DIASTOLIC BLOOD PRESSURE: 86 MMHG | BODY MASS INDEX: 30.45 KG/M2 | WEIGHT: 194.4 LBS | SYSTOLIC BLOOD PRESSURE: 122 MMHG

## 2021-12-15 DIAGNOSIS — F51.04 CHRONIC INSOMNIA: ICD-10-CM

## 2021-12-15 DIAGNOSIS — M54.6 CHRONIC MIDLINE THORACIC BACK PAIN: ICD-10-CM

## 2021-12-15 DIAGNOSIS — Z99.2 END-STAGE RENAL DISEASE ON HEMODIALYSIS (HCC): ICD-10-CM

## 2021-12-15 DIAGNOSIS — F41.1 GENERALIZED ANXIETY DISORDER WITH PANIC ATTACKS: Primary | ICD-10-CM

## 2021-12-15 DIAGNOSIS — N18.6 END-STAGE RENAL DISEASE ON HEMODIALYSIS (HCC): ICD-10-CM

## 2021-12-15 DIAGNOSIS — F41.0 GENERALIZED ANXIETY DISORDER WITH PANIC ATTACKS: Primary | ICD-10-CM

## 2021-12-15 DIAGNOSIS — F42.9 OBSESSIVE-COMPULSIVE DISORDER, UNSPECIFIED TYPE: ICD-10-CM

## 2021-12-15 DIAGNOSIS — G89.29 CHRONIC MIDLINE THORACIC BACK PAIN: ICD-10-CM

## 2021-12-15 PROCEDURE — G8417 CALC BMI ABV UP PARAM F/U: HCPCS | Performed by: FAMILY MEDICINE

## 2021-12-15 PROCEDURE — 1036F TOBACCO NON-USER: CPT | Performed by: FAMILY MEDICINE

## 2021-12-15 PROCEDURE — G8427 DOCREV CUR MEDS BY ELIG CLIN: HCPCS | Performed by: FAMILY MEDICINE

## 2021-12-15 PROCEDURE — 99214 OFFICE O/P EST MOD 30 MIN: CPT | Performed by: FAMILY MEDICINE

## 2021-12-15 PROCEDURE — G8484 FLU IMMUNIZE NO ADMIN: HCPCS | Performed by: FAMILY MEDICINE

## 2021-12-15 RX ORDER — BUPROPION HYDROCHLORIDE 150 MG/1
150 TABLET ORAL EVERY MORNING
Qty: 30 TABLET | Refills: 1 | Status: SHIPPED | OUTPATIENT
Start: 2021-12-15 | End: 2022-03-17 | Stop reason: SDUPTHER

## 2021-12-15 RX ORDER — HYDROCORTISONE 0.5 %
CREAM (GRAM) TOPICAL 3 TIMES DAILY PRN
Qty: 57 G | Refills: 0 | Status: ON HOLD
Start: 2021-12-15 | End: 2022-03-07 | Stop reason: HOSPADM

## 2021-12-15 RX ORDER — FUROSEMIDE 40 MG/1
40 TABLET ORAL DAILY
Status: ON HOLD | COMMUNITY
Start: 2021-12-08 | End: 2022-01-21 | Stop reason: HOSPADM

## 2021-12-15 ASSESSMENT — ENCOUNTER SYMPTOMS
BACK PAIN: 1
ABDOMINAL PAIN: 0
VOMITING: 0
WHEEZING: 0
ABDOMINAL DISTENTION: 0
NAUSEA: 0
RHINORRHEA: 0
SINUS PRESSURE: 0
CONSTIPATION: 0
COUGH: 1
TROUBLE SWALLOWING: 0
CHEST TIGHTNESS: 0
DIARRHEA: 0
BLOOD IN STOOL: 0
SHORTNESS OF BREATH: 0

## 2021-12-16 ENCOUNTER — TELEPHONE (OUTPATIENT)
Dept: FAMILY MEDICINE CLINIC | Age: 46
End: 2021-12-16

## 2021-12-16 RX ORDER — ALBUTEROL SULFATE 2.5 MG/.5ML
2.5 SOLUTION RESPIRATORY (INHALATION) EVERY 6 HOURS PRN
Qty: 20 ML | Refills: 2 | Status: ON HOLD
Start: 2021-12-16 | End: 2022-01-21 | Stop reason: HOSPADM

## 2021-12-16 NOTE — TELEPHONE ENCOUNTER
Pharmacy called stating they need clarification on medication. Pharmacy stated that albuterol concentrate was ordered instead of the diluted solution. Pharmacy wanted to know if doctor wanted diluted solution, as medication must be diluted before use. Pharmacy does not dispense concentrate. Please contact pharmacy at [phone].

## 2021-12-16 NOTE — TELEPHONE ENCOUNTER
The exact order I put into Epic was. ... albuterol (PROVENTIL) (5 MG/ML) 0.5% nebulizer solution      I automatically assumed that it was the diluted solution. I will resend rx to pharmacy for clarification. Damon Ang 177

## 2021-12-27 ENCOUNTER — APPOINTMENT (OUTPATIENT)
Dept: CT IMAGING | Age: 46
DRG: 194 | End: 2021-12-27
Payer: COMMERCIAL

## 2021-12-27 ENCOUNTER — HOSPITAL ENCOUNTER (INPATIENT)
Age: 46
LOS: 2 days | Discharge: HOME | DRG: 194 | End: 2021-12-29
Attending: EMERGENCY MEDICINE | Admitting: INTERNAL MEDICINE
Payer: COMMERCIAL

## 2021-12-27 ENCOUNTER — APPOINTMENT (OUTPATIENT)
Dept: GENERAL RADIOLOGY | Age: 46
DRG: 194 | End: 2021-12-27
Payer: COMMERCIAL

## 2021-12-27 DIAGNOSIS — N18.6 ESRD ON HEMODIALYSIS (HCC): ICD-10-CM

## 2021-12-27 DIAGNOSIS — Z99.2 ESRD ON HEMODIALYSIS (HCC): ICD-10-CM

## 2021-12-27 DIAGNOSIS — R53.1 GENERAL WEAKNESS: Primary | ICD-10-CM

## 2021-12-27 DIAGNOSIS — R29.6 MULTIPLE FALLS: ICD-10-CM

## 2021-12-27 DIAGNOSIS — E87.5 HYPERKALEMIA: ICD-10-CM

## 2021-12-27 LAB
A/G RATIO: 1.2 (ref 1.1–2.2)
ALBUMIN SERPL-MCNC: 4 G/DL (ref 3.4–5)
ALP BLD-CCNC: 194 U/L (ref 40–129)
ALT SERPL-CCNC: 15 U/L (ref 10–40)
ANION GAP SERPL CALCULATED.3IONS-SCNC: 16 MMOL/L (ref 3–16)
AST SERPL-CCNC: 18 U/L (ref 15–37)
BACTERIA: ABNORMAL /HPF
BASOPHILS ABSOLUTE: 0.1 K/UL (ref 0–0.2)
BASOPHILS RELATIVE PERCENT: 0.6 %
BILIRUB SERPL-MCNC: 0.3 MG/DL (ref 0–1)
BILIRUBIN URINE: NEGATIVE
BLOOD, URINE: ABNORMAL
BUN BLDV-MCNC: 56 MG/DL (ref 7–20)
CALCIUM SERPL-MCNC: 9 MG/DL (ref 8.3–10.6)
CHLORIDE BLD-SCNC: 99 MMOL/L (ref 99–110)
CLARITY: CLEAR
CO2: 23 MMOL/L (ref 21–32)
COLOR: YELLOW
CREAT SERPL-MCNC: 7 MG/DL (ref 0.6–1.1)
EOSINOPHILS ABSOLUTE: 0.3 K/UL (ref 0–0.6)
EOSINOPHILS RELATIVE PERCENT: 3 %
EPITHELIAL CELLS, UA: 5 /HPF (ref 0–5)
GFR AFRICAN AMERICAN: 8
GFR NON-AFRICAN AMERICAN: 6
GLUCOSE BLD-MCNC: 123 MG/DL (ref 70–99)
GLUCOSE BLD-MCNC: 133 MG/DL (ref 70–99)
GLUCOSE URINE: 250 MG/DL
HCT VFR BLD CALC: 35.6 % (ref 36–48)
HEMOGLOBIN: 11.3 G/DL (ref 12–16)
HYALINE CASTS: 7 /LPF (ref 0–8)
KETONES, URINE: NEGATIVE MG/DL
LEUKOCYTE ESTERASE, URINE: NEGATIVE
LYMPHOCYTES ABSOLUTE: 2.4 K/UL (ref 1–5.1)
LYMPHOCYTES RELATIVE PERCENT: 22.5 %
MCH RBC QN AUTO: 26.3 PG (ref 26–34)
MCHC RBC AUTO-ENTMCNC: 31.7 G/DL (ref 31–36)
MCV RBC AUTO: 83 FL (ref 80–100)
MICROSCOPIC EXAMINATION: YES
MONOCYTES ABSOLUTE: 0.9 K/UL (ref 0–1.3)
MONOCYTES RELATIVE PERCENT: 8.4 %
NEUTROPHILS ABSOLUTE: 7.1 K/UL (ref 1.7–7.7)
NEUTROPHILS RELATIVE PERCENT: 65.5 %
NITRITE, URINE: NEGATIVE
PDW BLD-RTO: 17.4 % (ref 12.4–15.4)
PERFORMED ON: ABNORMAL
PH UA: 7.5 (ref 5–8)
PLATELET # BLD: 213 K/UL (ref 135–450)
PMV BLD AUTO: 8.4 FL (ref 5–10.5)
POTASSIUM REFLEX MAGNESIUM: 5.9 MMOL/L (ref 3.5–5.1)
POTASSIUM SERPL-SCNC: 4.8 MMOL/L (ref 3.5–5.1)
PRO-BNP: 2333 PG/ML (ref 0–124)
PROTEIN UA: >=300 MG/DL
RBC # BLD: 4.29 M/UL (ref 4–5.2)
RBC UA: 6 /HPF (ref 0–4)
SODIUM BLD-SCNC: 138 MMOL/L (ref 136–145)
SPECIFIC GRAVITY UA: 1.01 (ref 1–1.03)
TOTAL PROTEIN: 7.4 G/DL (ref 6.4–8.2)
TROPONIN: 0.3 NG/ML
URINE REFLEX TO CULTURE: ABNORMAL
URINE TYPE: ABNORMAL
UROBILINOGEN, URINE: 0.2 E.U./DL
WBC # BLD: 10.8 K/UL (ref 4–11)
WBC UA: 4 /HPF (ref 0–5)

## 2021-12-27 PROCEDURE — 84132 ASSAY OF SERUM POTASSIUM: CPT

## 2021-12-27 PROCEDURE — 84484 ASSAY OF TROPONIN QUANT: CPT

## 2021-12-27 PROCEDURE — 80053 COMPREHEN METABOLIC PANEL: CPT

## 2021-12-27 PROCEDURE — 83036 HEMOGLOBIN GLYCOSYLATED A1C: CPT

## 2021-12-27 PROCEDURE — 71045 X-RAY EXAM CHEST 1 VIEW: CPT

## 2021-12-27 PROCEDURE — 93005 ELECTROCARDIOGRAM TRACING: CPT | Performed by: EMERGENCY MEDICINE

## 2021-12-27 PROCEDURE — 72125 CT NECK SPINE W/O DYE: CPT

## 2021-12-27 PROCEDURE — G0378 HOSPITAL OBSERVATION PER HR: HCPCS

## 2021-12-27 PROCEDURE — 72128 CT CHEST SPINE W/O DYE: CPT

## 2021-12-27 PROCEDURE — 96375 TX/PRO/DX INJ NEW DRUG ADDON: CPT

## 2021-12-27 PROCEDURE — 36415 COLL VENOUS BLD VENIPUNCTURE: CPT

## 2021-12-27 PROCEDURE — 83880 ASSAY OF NATRIURETIC PEPTIDE: CPT

## 2021-12-27 PROCEDURE — 85025 COMPLETE CBC W/AUTO DIFF WBC: CPT

## 2021-12-27 PROCEDURE — 1200000000 HC SEMI PRIVATE

## 2021-12-27 PROCEDURE — 2500000003 HC RX 250 WO HCPCS: Performed by: PHYSICIAN ASSISTANT

## 2021-12-27 PROCEDURE — 72131 CT LUMBAR SPINE W/O DYE: CPT

## 2021-12-27 PROCEDURE — 96374 THER/PROPH/DIAG INJ IV PUSH: CPT

## 2021-12-27 PROCEDURE — 81001 URINALYSIS AUTO W/SCOPE: CPT

## 2021-12-27 PROCEDURE — 99284 EMERGENCY DEPT VISIT MOD MDM: CPT

## 2021-12-27 PROCEDURE — 6370000000 HC RX 637 (ALT 250 FOR IP): Performed by: PHYSICIAN ASSISTANT

## 2021-12-27 PROCEDURE — 70450 CT HEAD/BRAIN W/O DYE: CPT

## 2021-12-27 RX ORDER — DEXTROSE MONOHYDRATE 25 G/50ML
25 INJECTION, SOLUTION INTRAVENOUS ONCE
Status: COMPLETED | OUTPATIENT
Start: 2021-12-27 | End: 2021-12-27

## 2021-12-27 RX ORDER — DEXTROSE MONOHYDRATE 25 G/50ML
25 INJECTION, SOLUTION INTRAVENOUS PRN
Status: DISCONTINUED | OUTPATIENT
Start: 2021-12-27 | End: 2021-12-27

## 2021-12-27 RX ADMIN — DEXTROSE MONOHYDRATE 25 G: 25 INJECTION, SOLUTION INTRAVENOUS at 21:14

## 2021-12-27 RX ADMIN — INSULIN HUMAN 5 UNITS: 100 INJECTION, SOLUTION PARENTERAL at 21:16

## 2021-12-27 RX ADMIN — SODIUM ZIRCONIUM CYCLOSILICATE 10 G: 10 POWDER, FOR SUSPENSION ORAL at 21:06

## 2021-12-27 ASSESSMENT — ENCOUNTER SYMPTOMS
CONSTIPATION: 0
COLOR CHANGE: 0
ABDOMINAL PAIN: 0
DIARRHEA: 0
CHEST TIGHTNESS: 0
COUGH: 0
SHORTNESS OF BREATH: 0
VOMITING: 0
BACK PAIN: 1
RESPIRATORY NEGATIVE: 1
NAUSEA: 0
PHOTOPHOBIA: 0

## 2021-12-27 NOTE — ED PROVIDER NOTES
[address] ENCOUNTER        Pt Name: Criselda Diop  MRN: [id number]  [place] 1975  Date of evaluation: 12/27/2021  Provider: DEE Zeng  PCP: Karen Cardenas  Note Started: 3:32 PM EST        I have seen and evaluated this patient with my supervising physician Mason Chery MD.    CHIEF COMPLAINT       Chief Complaint   Patient presents with    Fall     fell at dialysis today, was putting her shoe on and fell, unsure if she hit her head, no LOC, denies pain anywhere       HISTORY OF PRESENT ILLNESS   (Location, Timing/Onset, Context/Setting, Quality, Duration, Modifying Factors, Severity, Associated Signs and Symptoms)  Note limiting factors. Chief Complaint: Falls/Weakness     Criselda Diop is a 55 y.o. female with past medical history of CHF, previous CVA, chronic kidney disease on hemodialysis every Monday Wednesday Friday, hypertension, neuropathy, hyperlipidemia who presents to the ED with complaint of generalized weakness with multiple falls. Patient states she has had daily falls since 15 December. Patient states she does have some generalized weakness with multiple falls in the past secondary to her previous stroke. Patient states she walks with a walker. Patient states she has never fallen this much for this consistently. Patient states today she was at dialysis and states she was putting her shoe on when she fell. States she did hit her head. Patient states she is had pain to her neck and low back. Patient states she did not receive dialysis today. She was sent to the ED for further evaluation and treatment of generalized weakness with multiple falls. She states she does produce small amount of urine. She denies any decreased urinary output or changes in urinary output. She denies any changes in bowel movements. Denies abdominal pain, nausea/vomiting, chest pain or shortness of breath.   Denies fever chills. Denies rashes or lesions. Denies lightheadedness or dizziness. Denies visual changes or speech disturbances. Denies any new numbness or tingling. Patient states she is concerned he is falling secondary to a new medication. Patient that she was recently started on a new medication several weeks ago but cannot remember the name. States she believes she is falling secondary to this medication change. Nursing Notes were all reviewed and agreed with or any disagreements were addressed in the HPI. REVIEW OF SYSTEMS    (2-9 systems for level 4, 10 or more for level 5)     Review of Systems   Constitutional: Positive for activity change. Negative for appetite change, chills, diaphoresis, fatigue and fever. Eyes: Negative for photophobia and visual disturbance. Respiratory: Negative. Negative for cough, chest tightness and shortness of breath. Cardiovascular: Negative. Negative for chest pain, palpitations and leg swelling. Gastrointestinal: Negative for abdominal pain, constipation, diarrhea, nausea and vomiting. Genitourinary: Negative for decreased urine volume, difficulty urinating, dysuria, flank pain, frequency, hematuria and urgency. Musculoskeletal: Positive for back pain, gait problem, myalgias and neck pain. Negative for arthralgias, joint swelling and neck stiffness. Skin: Negative for color change, pallor, rash and wound. Neurological: Positive for weakness and headaches. Negative for dizziness, tremors, seizures, syncope, facial asymmetry, speech difficulty, light-headedness and numbness. Positives and Pertinent negatives as per HPI. Except as noted above in the ROS, all other systems were reviewed and negative.        PAST MEDICAL HISTORY     Past Medical History:   Diagnosis Date    Blind in both eyes     Cerebral artery occlusion with cerebral infarction ([facility]     CHF (congestive heart failure) (HCC)     Chronic kidney disease     Depression     [facility]     Diabetes mellitus, type II ([facility] [facility]     2005    Diabetic neuropathy associated with type 2 diabetes mellitus ([facility]     Generalized headaches     Hypertension     Infertility     Insomnia     chronic vs lack of time spent to sleep    Migraine headache 11/09/2011    Mixed hyperlipidemia     Otitis media     h/o recurrent    Pelvic abscess in female 10/05/2013    Pneumonia     2004 approx.  Stroke ([facility] 08/27/2020    Stroke ([facility] 08/27/2021         SURGICAL HISTORY     Past Surgical History:   Procedure Laterality Date    CERVIX SURGERY      laser tx for dysplasia;1992    EYE SURGERY      FOOT SURGERY Right     FOOT SURGERY Bilateral     FOOT SURGERY Left     IR TUNNELED CATHETER PLACEMENT GREATER THAN 5 YEARS  9/7/2021    IR TUNNELED CATHETER PLACEMENT GREATER THAN 5 YEARS 9/7/2021 Ivana Knapp MD [facility] SPECIAL PROCEDURES         CURRENTMEDICATIONS       Previous Medications    ALBUTEROL (PROVENTIL) 2.5 MG/0.5ML NEBU NEBULIZER SOLUTION    Take 0.5 mLs by nebulization every 6 hours as needed for Wheezing or Shortness of Breath    ALBUTEROL SULFATE HFA (PROVENTIL HFA) 108 (90 BASE) MCG/ACT INHALER    Inhale 2 puffs into the lungs every 4 hours as needed for Wheezing    ALBUTEROL SULFATE  (90 BASE) MCG/ACT INHALER    Inhale 2 puffs into the lungs every 6 hours as needed for Wheezing or Shortness of Breath    ALPRAZOLAM (XANAX XR) 3 MG EXTENDED RELEASE TABLET    Take 1 tablet by mouth every morning for 30 days.     AMLODIPINE (NORVASC) 10 MG TABLET        ASPIRIN 81 MG EC TABLET    Take 1 tablet by mouth daily    ATORVASTATIN (LIPITOR) 40 MG TABLET    Take 1 tablet by mouth nightly    BENZONATATE (TESSALON) 200 MG CAPSULE    Take 1 capsule by mouth every 8 hours as needed for Cough    BUPROPION (WELLBUTRIN XL) 150 MG EXTENDED RELEASE TABLET    Take 1 tablet by mouth every morning    CLONIDINE (CATAPRES) 0.2 MG/24HR PTWK    Place 1 patch onto the skin once a week    DULAGLUTIDE 1.5 MG/0.5ML SOPN    Inject 1.5 mg into the skin once a week    FLUVOXAMINE (LUVOX) 100 MG TABLET    Take 1 tablet by mouth nightly    FUROSEMIDE (LASIX) 40 MG TABLET        GLUCOSE (GLUTOSE) 40 % GEL    Take 37.5 mLs by mouth as needed (low blood sugar)    GLYCOPYRROLATE-FORMOTEROL (BEVESPI AEROSPHERE) 9-4.8 MCG/ACT AERO    Inhale 2 puffs into the lungs 2 times daily    HEAT WRAPS (THERMACARE BACK/HIP) MISC    1 each by Does not apply route daily as needed (back pain)    HYDROXYZINE (ATARAX) 25 MG TABLET        INSULIN GLARGINE (LANTUS SOLOSTAR) 100 UNIT/ML INJECTION PEN    Inject 20 Units into the skin every morning     INSULIN LISPRO, 1 UNIT DIAL, 100 UNIT/ML SOPN    Inject 0-6 Units into the skin 3 times daily (with meals) **Corrective Low Dose Algorithm**  Glucose: Dose:               No Insulin  140-199 1 Unit  200-249 2 Units  250-299 3 Units  300-349 4 Units  350-399 5 Units  Over 399 6 Units    INSULIN PEN NEEDLE 29G X 12.7MM MISC    1 each by Does not apply route daily    LISINOPRIL (PRINIVIL;ZESTRIL) 40 MG TABLET    Take 40 mg by mouth daily    METHYL SALICYLATE-MENTHOL (RANDI LEBLANC GREASELESS) 10-15 % CREA    Apply topically 3 times daily as needed for Pain    MISC. DEVICES (PULSE OXIMETER) MISC    1 each by Does not apply route every 6-8 hours as needed (shortness of breath)    NASAL DILATORS (BREATHE RIGHT EXTRA STRENGTH) STRP    1 strip by Does not apply route nightly as needed (nasal congestion)    PREGABALIN (LYRICA) 75 MG CAPSULE    Take 1 capsule by mouth nightly for 30 doses.     PROPRANOLOL (INDERAL LA) 80 MG EXTENDED RELEASE CAPSULE    TAKE ONE CAPSULE BY MOUTH EVERY MORNING    SPIRONOLACTONE (ALDACTONE) 50 MG TABLET    TAKE ONE TABLET BY MOUTH DAILY         ALLERGIES     Amoxicillin, Levofloxacin, Vancomycin, and Tape [adhesive tape]    FAMILYHISTORY       Family History   Problem Relation Age of Onset    Diabetes Mother    [employer] Other Mother 79        Covid    Diabetes Father     High Blood Pressure Father     Colon Cancer Father     Diabetes Sister     Alcohol Abuse Maternal Grandfather     Diabetes Paternal Grandmother     Alcohol Abuse Paternal Grandfather     Diabetes Paternal Aunt     Diabetes Paternal Uncle           SOCIAL HISTORY       Social History     Tobacco Use    Smoking status: Former Smoker     Packs/day: 1.00     Types: Cigarettes    Smokeless tobacco: Never Used    Tobacco comment: Quit in August 2021   Vaping Use    Vaping Use: Never used   Substance Use Topics    Alcohol use: No     Comment: Very Rare    Drug use: No       SCREENINGS             PHYSICAL EXAM    (up to 7 for level 4, 8 or more for level 5)     ED Triage Vitals [12/27/21 1443]   BP Temp Temp src Pulse Resp SpO2 Height Weight   109/82 98 °F (36.7 °C) -- 93 16 98 % -- --       Physical Exam  Constitutional:       General: She is not in acute distress. Appearance: Normal appearance. She is well-developed. She is not ill-appearing, toxic-appearing or diaphoretic. HENT:      Head: Normocephalic and atraumatic. Comments: Atraumatic. No raccoon eyes or walsh sign     Right Ear: External ear normal.      Left Ear: External ear normal.      Nose: Nose normal. No congestion or rhinorrhea. Mouth/Throat:      Mouth: Mucous membranes are moist.      Pharynx: No oropharyngeal exudate or posterior oropharyngeal erythema. Eyes:      General:         Right eye: No discharge. Left eye: No discharge. Extraocular Movements: Extraocular movements intact. Conjunctiva/sclera: Conjunctivae normal.      Pupils: Pupils are equal, round, and reactive to light. Cardiovascular:      Rate and Rhythm: Normal rate and regular rhythm. Pulses: Normal pulses. Heart sounds: Normal heart sounds. No murmur heard. No friction rub. No gallop. Comments: 2+ radial pulses bilaterally. No pedal edema. No calf tenderness. No JVD.   Dialysis catheter noted to the right-sided chest wall. Pulmonary:      Effort: Pulmonary effort is normal. No respiratory distress. Breath sounds: Normal breath sounds. No stridor. No wheezing, rhonchi or rales. Chest:      Chest wall: No tenderness. Abdominal:      General: Abdomen is flat. Bowel sounds are normal. There is no distension. Palpations: Abdomen is soft. There is no mass. Tenderness: There is no abdominal tenderness. There is no right CVA tenderness, left CVA tenderness, guarding or rebound. Hernia: No hernia is present. Musculoskeletal:         General: Normal range of motion. Cervical back: Normal range of motion and neck supple. No rigidity or tenderness. Comments: No TTP to the midline cervical spine. There is tender palpation over the midline lower thoracic spine and upper lumbar spine. There is no crepitus or step-off. No skin changes. No rashes or lesions. There is no CVA tenderness. No tenderness palpation of the anterior chest.  No rib cage tenderness or clavicular tenderness bilaterally. No pelvis instability. There is no TTP to the upper extremities and lower extremities throughout. There is full range of motion strength to the upper and lower extremities throughout. Distal neurovascular intact. Gait deferred   Lymphadenopathy:      Cervical: No cervical adenopathy. Skin:     General: Skin is warm and dry. Coloration: Skin is not pale. Findings: No erythema or rash. Neurological:      General: No focal deficit present. Mental Status: She is alert and oriented to person, place, and time. GCS: GCS eye subscore is 4. GCS verbal subscore is 5. GCS motor subscore is 6. Cranial Nerves: Cranial nerves are intact. No cranial nerve deficit, dysarthria or facial asymmetry. Sensory: Sensation is intact. No sensory deficit. Motor: Motor function is intact. No weakness.       Comments: Gait deferred   Psychiatric:         Behavior: Behavior normal. DIAGNOSTIC RESULTS   LABS:    Labs Reviewed   CBC WITH AUTO DIFFERENTIAL - Abnormal; Notable for the following components:       Result Value    Hemoglobin 11.3 (*)     Hematocrit 35.6 (*)     RDW 17.4 (*)     All other components within normal limits    Narrative:     Performed at:  [facility] [address]   Phone [phone]   COMPREHENSIVE METABOLIC PANEL W/ REFLEX TO MG FOR LOW K - Abnormal; Notable for the following components:    Potassium reflex Magnesium 5.9 (*)     Glucose 133 (*)     BUN 56 (*)     CREATININE 7.0 (*)     GFR Non- 6 (*)     GFR African American 8 (*)     Alkaline Phosphatase 194 (*)     All other components within normal limits    Narrative:     Anthony Zamora  [facility] tel. [phone],  Chemistry results called to and read back by marian almanza, 12/27/2021  16:27, by DECDA  Performed at:  [facility] [address]   Phone [phone]   TROPONIN - Abnormal; Notable for the following components:    Troponin 0.30 (*)     All other components within normal limits    Narrative:     Anthony Zamora  [facility] tel. [phone],  Chemistry results called to and read back by marian almanza, 12/27/2021  16:27, by DECDA  Performed at:  [facility] [address], [facility]   Phone [phone]   BRAIN NATRIURETIC PEPTIDE - Abnormal; Notable for the following components:    Pro-BNP 2,333 (*)     All other components within normal limits    Narrative:     Anthony Zamora  [facility] tel. [phone],  Chemistry results called to and read back by marian almanza, 12/27/2021  16:27, by DECDA  Performed at:  [facility] [address], [facility]   Phone [phone]   URINE RT REFLEX TO CULTURE - Abnormal; Notable for the following components:    Glucose, Ur 250 (*)     Blood, Urine SMALL (*)     Protein, UA >=300 (*)     All other components within normal limits    Narrative:     Performed at:  [facility]  [address] [facility]  [place], [address]   Phone [phone]   MICROSCOPIC URINALYSIS - Abnormal; Notable for the following components:    Bacteria, UA 1+ (*)     RBC, UA 6 (*)     All other components within normal limits    Narrative:     Performed at:  [facility]  [address] [facility]  [place], [address]   Phone [phone]   POTASSIUM       When ordered only abnormal lab results are displayed. All other labs were within normal range or not returned as of this dictation. EKG: When ordered, EKG's are interpreted by the Emergency Department Physician in the absence of a cardiologist.  Please see their note for interpretation of EKG. RADIOLOGY:   Non-plain film images such as CT, Ultrasound and MRI are read by the radiologist. Plain radiographic images are visualized and preliminarily interpreted by the ED Provider with the below findings:        Interpretation per the Radiologist below, if available at the time of this note:    CT CERVICAL SPINE WO CONTRAST   Final Result   Mild degenerative disc space narrowing throughout with no acute abnormality   seen. 2.5 cm hypodensity left lobe of the thyroid gland. Suggest ultrasound   follow-up. RECOMMENDATIONS:   Unavailable         CT THORACIC SPINE WO CONTRAST   Final Result   No acute fracture or subluxation of the thoracic spine. Multilevel spondylosis the      RECOMMENDATIONS:   Unavailable         CT LUMBAR SPINE WO CONTRAST   Final Result   No evidence acute fracture traumatic malalignment of the lumbar spine. Borderline/upper limits normal lymph nodes within the retroperitoneum 9 mm   short axis dimension. Please correlate with history and laboratory testing.       RECOMMENDATIONS:   Unavailable         CT HEAD WO CONTRAST   Final Result   No acute intracranial abnormality. XR CHEST PORTABLE   Final Result   Stable cardiomegaly. No acute cardiopulmonary process. No results found. PROCEDURES   Unless otherwise noted below, none     Procedures    CRITICAL CARE TIME   N/A    CONSULTS:  IP CONSULT TO NEPHROLOGY  IP CONSULT TO INTERNAL MEDICINE      EMERGENCY DEPARTMENT COURSE and DIFFERENTIAL DIAGNOSIS/MDM:   Vitals:    Vitals:    12/27/21 1443   BP: 109/82   Pulse: 93   Resp: 16   Temp: 98 °F (36.7 °C)   SpO2: 98%       Patient was given the following medications:  Medications   insulin regular (HUMULIN R;NOVOLIN R) injection 5 Units (has no administration in time range)   sodium zirconium cyclosilicate (LOKELMA) oral suspension 10 g (has no administration in time range)   dextrose 50 % IV solution (has no administration in time range)           Patient is a 44-year-old female who presents to the ED with complaint of weakness. Patient complain generalized weakness with multiple falls over the past several days. Came in today for a fall that she had prior to dialysis. Did not receive dialysis today. She states she receives dialysis every Monday Wednesday Friday. Upon evaluation patient is afebrile stable vital signs. She is nontoxic appearance. Reassuring neurologic examination. Urinalysis showed 1+ bacteria 6 red blood cells. CBC showed normal white count and platelets. Hemoglobin 11.3. CMP showed a creatinine of 7 with BUN of 56. Potassium 5.9. Troponin 0.3. Patient's troponin has been elevated at similar levels in the past.  No complaints of chest pain. EKG inter by attending. BNP 2300. CT of the cervical spine showed thyroid nodule otherwise unremarkable. CT thoracic spine unremarkable. CT of the lumbar spine showed no acute fracture or traumatic malalignment. There are borderline upper limit lymph nodes to the retroperitoneum. CT of the head unremarkable.   Chest x-ray shows stable cardiomegaly without acute cardiopulmonary process. Patient suffering from generalized weakness with multiple falls with history of end-stage renal disease on hemodialysis with hyperkalemia here in the ED. Repeat potassium level ordered. Patient did not receive dialysis today. Given patient's documented hyperkalemia and not receiving dialysis today believe would benefit from admission for dialysis. Case we discussed with patient's nephrologist.  Will do consult Dr. Skylar Henao who admits for PCP for admission. Talk to patient's nephrologist, Dr. Lauren Rahman, who recommended 10 grams of LIZBET here in the emergency department. Also recommended 5 units of insulin IV and amp of D50. Will arrange for patient to have dialysis first thing in the morning. FINAL IMPRESSION      1. General weakness    2. Multiple falls    3. ESRD on hemodialysis ([address]    4. Hyperkalemia          DISPOSITION/PLAN   DISPOSITION Decision To Admit 12/27/2021 07:55:42 PM      PATIENT REFERRED TO:  No follow-up provider specified.     DISCHARGE MEDICATIONS:  New Prescriptions    No medications on file       DISCONTINUED MEDICATIONS:  Discontinued Medications    No medications on file              (Please note that portions of this note were completed with a voice recognition program.  Efforts were made to edit the dictations but occasionally words are mis-transcribed.)    DEE Shaffer (electronically signed)          DEE Hargrove  12/27/21 Scott Pan  12/27/21 2014

## 2021-12-28 LAB
A/G RATIO: 1.3 (ref 1.1–2.2)
ALBUMIN SERPL-MCNC: 3.7 G/DL (ref 3.4–5)
ALP BLD-CCNC: 182 U/L (ref 40–129)
ALT SERPL-CCNC: 15 U/L (ref 10–40)
ANION GAP SERPL CALCULATED.3IONS-SCNC: 16 MMOL/L (ref 3–16)
AST SERPL-CCNC: 21 U/L (ref 15–37)
BASOPHILS ABSOLUTE: 0 K/UL (ref 0–0.2)
BASOPHILS RELATIVE PERCENT: 0.6 %
BILIRUB SERPL-MCNC: 0.3 MG/DL (ref 0–1)
BUN BLDV-MCNC: 57 MG/DL (ref 7–20)
CALCIUM SERPL-MCNC: 8.8 MG/DL (ref 8.3–10.6)
CHLORIDE BLD-SCNC: 102 MMOL/L (ref 99–110)
CO2: 22 MMOL/L (ref 21–32)
CREAT SERPL-MCNC: 7.4 MG/DL (ref 0.6–1.1)
EKG ATRIAL RATE: 88 BPM
EKG DIAGNOSIS: NORMAL
EKG P AXIS: 37 DEGREES
EKG P-R INTERVAL: 176 MS
EKG Q-T INTERVAL: 372 MS
EKG QRS DURATION: 86 MS
EKG QTC CALCULATION (BAZETT): 450 MS
EKG R AXIS: 10 DEGREES
EKG T AXIS: 68 DEGREES
EKG VENTRICULAR RATE: 88 BPM
EOSINOPHILS ABSOLUTE: 0.3 K/UL (ref 0–0.6)
EOSINOPHILS RELATIVE PERCENT: 4 %
ESTIMATED AVERAGE GLUCOSE: 145.6 MG/DL
GFR AFRICAN AMERICAN: 7
GFR NON-AFRICAN AMERICAN: 6
GLUCOSE BLD-MCNC: 115 MG/DL (ref 70–99)
GLUCOSE BLD-MCNC: 132 MG/DL (ref 70–99)
GLUCOSE BLD-MCNC: 137 MG/DL (ref 70–99)
GLUCOSE BLD-MCNC: 154 MG/DL (ref 70–99)
GLUCOSE BLD-MCNC: 86 MG/DL (ref 70–99)
HBA1C MFR BLD: 6.7 %
HCT VFR BLD CALC: 34.8 % (ref 36–48)
HEMOGLOBIN: 11 G/DL (ref 12–16)
LYMPHOCYTES ABSOLUTE: 2.2 K/UL (ref 1–5.1)
LYMPHOCYTES RELATIVE PERCENT: 28.1 %
MAGNESIUM: 2 MG/DL (ref 1.8–2.4)
MCH RBC QN AUTO: 26.3 PG (ref 26–34)
MCHC RBC AUTO-ENTMCNC: 31.6 G/DL (ref 31–36)
MCV RBC AUTO: 83.4 FL (ref 80–100)
MONOCYTES ABSOLUTE: 0.6 K/UL (ref 0–1.3)
MONOCYTES RELATIVE PERCENT: 7.2 %
NEUTROPHILS ABSOLUTE: 4.8 K/UL (ref 1.7–7.7)
NEUTROPHILS RELATIVE PERCENT: 60.1 %
PDW BLD-RTO: 17.4 % (ref 12.4–15.4)
PERFORMED ON: ABNORMAL
PERFORMED ON: NORMAL
PLATELET # BLD: 193 K/UL (ref 135–450)
PMV BLD AUTO: 8.8 FL (ref 5–10.5)
POTASSIUM REFLEX MAGNESIUM: 5.8 MMOL/L (ref 3.5–5.1)
RBC # BLD: 4.18 M/UL (ref 4–5.2)
SODIUM BLD-SCNC: 140 MMOL/L (ref 136–145)
TOTAL PROTEIN: 6.6 G/DL (ref 6.4–8.2)
WBC # BLD: 8 K/UL (ref 4–11)

## 2021-12-28 PROCEDURE — 97530 THERAPEUTIC ACTIVITIES: CPT

## 2021-12-28 PROCEDURE — 97162 PT EVAL MOD COMPLEX 30 MIN: CPT

## 2021-12-28 PROCEDURE — 94761 N-INVAS EAR/PLS OXIMETRY MLT: CPT

## 2021-12-28 PROCEDURE — 97166 OT EVAL MOD COMPLEX 45 MIN: CPT

## 2021-12-28 PROCEDURE — 6360000002 HC RX W HCPCS: Performed by: INTERNAL MEDICINE

## 2021-12-28 PROCEDURE — 97116 GAIT TRAINING THERAPY: CPT

## 2021-12-28 PROCEDURE — 1200000000 HC SEMI PRIVATE

## 2021-12-28 PROCEDURE — 36415 COLL VENOUS BLD VENIPUNCTURE: CPT

## 2021-12-28 PROCEDURE — G0378 HOSPITAL OBSERVATION PER HR: HCPCS

## 2021-12-28 PROCEDURE — 96372 THER/PROPH/DIAG INJ SC/IM: CPT

## 2021-12-28 PROCEDURE — 6370000000 HC RX 637 (ALT 250 FOR IP): Performed by: INTERNAL MEDICINE

## 2021-12-28 PROCEDURE — 93010 ELECTROCARDIOGRAM REPORT: CPT | Performed by: INTERNAL MEDICINE

## 2021-12-28 PROCEDURE — 85025 COMPLETE CBC W/AUTO DIFF WBC: CPT

## 2021-12-28 PROCEDURE — 2580000003 HC RX 258: Performed by: INTERNAL MEDICINE

## 2021-12-28 PROCEDURE — 90935 HEMODIALYSIS ONE EVALUATION: CPT

## 2021-12-28 PROCEDURE — 80053 COMPREHEN METABOLIC PANEL: CPT

## 2021-12-28 PROCEDURE — 5A1D70Z PERFORMANCE OF URINARY FILTRATION, INTERMITTENT, LESS THAN 6 HOURS PER DAY: ICD-10-PCS | Performed by: INTERNAL MEDICINE

## 2021-12-28 PROCEDURE — 97535 SELF CARE MNGMENT TRAINING: CPT

## 2021-12-28 PROCEDURE — 83735 ASSAY OF MAGNESIUM: CPT

## 2021-12-28 PROCEDURE — 94640 AIRWAY INHALATION TREATMENT: CPT

## 2021-12-28 RX ORDER — PROPRANOLOL HYDROCHLORIDE 80 MG/1
80 CAPSULE, EXTENDED RELEASE ORAL DAILY
Status: DISCONTINUED | OUTPATIENT
Start: 2021-12-28 | End: 2021-12-29 | Stop reason: HOSPADM

## 2021-12-28 RX ORDER — CLONIDINE 0.2 MG/24H
1 PATCH, EXTENDED RELEASE TRANSDERMAL WEEKLY
Status: DISCONTINUED | OUTPATIENT
Start: 2022-01-03 | End: 2021-12-29 | Stop reason: HOSPADM

## 2021-12-28 RX ORDER — FUROSEMIDE 40 MG/1
40 TABLET ORAL DAILY
Status: DISCONTINUED | OUTPATIENT
Start: 2021-12-28 | End: 2021-12-29 | Stop reason: HOSPADM

## 2021-12-28 RX ORDER — INSULIN LISPRO 100 [IU]/ML
0-6 INJECTION, SOLUTION INTRAVENOUS; SUBCUTANEOUS NIGHTLY
Status: DISCONTINUED | OUTPATIENT
Start: 2021-12-28 | End: 2021-12-29 | Stop reason: HOSPADM

## 2021-12-28 RX ORDER — PREGABALIN 75 MG/1
75 CAPSULE ORAL NIGHTLY
Status: DISCONTINUED | OUTPATIENT
Start: 2021-12-28 | End: 2021-12-29 | Stop reason: HOSPADM

## 2021-12-28 RX ORDER — SPIRONOLACTONE 25 MG/1
50 TABLET ORAL DAILY
Status: DISCONTINUED | OUTPATIENT
Start: 2021-12-28 | End: 2021-12-29 | Stop reason: HOSPADM

## 2021-12-28 RX ORDER — BENZONATATE 100 MG/1
200 CAPSULE ORAL EVERY 8 HOURS PRN
Status: DISCONTINUED | OUTPATIENT
Start: 2021-12-28 | End: 2021-12-29 | Stop reason: HOSPADM

## 2021-12-28 RX ORDER — INSULIN LISPRO 100 [IU]/ML
0-6 INJECTION, SOLUTION INTRAVENOUS; SUBCUTANEOUS
Status: DISCONTINUED | OUTPATIENT
Start: 2021-12-28 | End: 2021-12-28 | Stop reason: ALTCHOICE

## 2021-12-28 RX ORDER — HEPARIN SODIUM 5000 [USP'U]/ML
5000 INJECTION, SOLUTION INTRAVENOUS; SUBCUTANEOUS 2 TIMES DAILY
Status: DISCONTINUED | OUTPATIENT
Start: 2021-12-28 | End: 2021-12-29 | Stop reason: HOSPADM

## 2021-12-28 RX ORDER — ASPIRIN 81 MG/1
81 TABLET ORAL DAILY
Status: DISCONTINUED | OUTPATIENT
Start: 2021-12-28 | End: 2021-12-29 | Stop reason: HOSPADM

## 2021-12-28 RX ORDER — SODIUM CHLORIDE 9 MG/ML
25 INJECTION, SOLUTION INTRAVENOUS PRN
Status: DISCONTINUED | OUTPATIENT
Start: 2021-12-28 | End: 2021-12-29 | Stop reason: HOSPADM

## 2021-12-28 RX ORDER — FLUVOXAMINE MALEATE 50 MG/1
100 TABLET, COATED ORAL NIGHTLY
Status: DISCONTINUED | OUTPATIENT
Start: 2021-12-28 | End: 2021-12-29 | Stop reason: HOSPADM

## 2021-12-28 RX ORDER — ACETAMINOPHEN 325 MG/1
650 TABLET ORAL EVERY 6 HOURS PRN
Status: DISCONTINUED | OUTPATIENT
Start: 2021-12-28 | End: 2021-12-29 | Stop reason: HOSPADM

## 2021-12-28 RX ORDER — HEPARIN SODIUM 1000 [USP'U]/ML
3600 INJECTION, SOLUTION INTRAVENOUS; SUBCUTANEOUS PRN
Status: DISCONTINUED | OUTPATIENT
Start: 2021-12-28 | End: 2021-12-29 | Stop reason: HOSPADM

## 2021-12-28 RX ORDER — AMLODIPINE BESYLATE 5 MG/1
10 TABLET ORAL DAILY
Status: DISCONTINUED | OUTPATIENT
Start: 2021-12-28 | End: 2021-12-29 | Stop reason: HOSPADM

## 2021-12-28 RX ORDER — POTASSIUM CHLORIDE 7.45 MG/ML
10 INJECTION INTRAVENOUS PRN
Status: DISCONTINUED | OUTPATIENT
Start: 2021-12-28 | End: 2021-12-28

## 2021-12-28 RX ORDER — ONDANSETRON 4 MG/1
4 TABLET, ORALLY DISINTEGRATING ORAL EVERY 8 HOURS PRN
Status: DISCONTINUED | OUTPATIENT
Start: 2021-12-28 | End: 2021-12-29 | Stop reason: HOSPADM

## 2021-12-28 RX ORDER — NICOTINE POLACRILEX 4 MG
15 LOZENGE BUCCAL PRN
Status: DISCONTINUED | OUTPATIENT
Start: 2021-12-28 | End: 2021-12-29 | Stop reason: HOSPADM

## 2021-12-28 RX ORDER — ONDANSETRON 2 MG/ML
4 INJECTION INTRAMUSCULAR; INTRAVENOUS EVERY 6 HOURS PRN
Status: DISCONTINUED | OUTPATIENT
Start: 2021-12-28 | End: 2021-12-29 | Stop reason: HOSPADM

## 2021-12-28 RX ORDER — HYDRALAZINE HYDROCHLORIDE 20 MG/ML
10 INJECTION INTRAMUSCULAR; INTRAVENOUS EVERY 6 HOURS PRN
Status: DISCONTINUED | OUTPATIENT
Start: 2021-12-28 | End: 2021-12-29 | Stop reason: HOSPADM

## 2021-12-28 RX ORDER — POTASSIUM CHLORIDE 20 MEQ/1
40 TABLET, EXTENDED RELEASE ORAL PRN
Status: DISCONTINUED | OUTPATIENT
Start: 2021-12-28 | End: 2021-12-28

## 2021-12-28 RX ORDER — DEXTROSE MONOHYDRATE 50 MG/ML
100 INJECTION, SOLUTION INTRAVENOUS PRN
Status: DISCONTINUED | OUTPATIENT
Start: 2021-12-28 | End: 2021-12-29 | Stop reason: HOSPADM

## 2021-12-28 RX ORDER — INSULIN LISPRO 100 [IU]/ML
0-12 INJECTION, SOLUTION INTRAVENOUS; SUBCUTANEOUS
Status: DISCONTINUED | OUTPATIENT
Start: 2021-12-28 | End: 2021-12-29 | Stop reason: HOSPADM

## 2021-12-28 RX ORDER — 0.9 % SODIUM CHLORIDE 0.9 %
500 INTRAVENOUS SOLUTION INTRAVENOUS PRN
Status: DISCONTINUED | OUTPATIENT
Start: 2021-12-28 | End: 2021-12-29 | Stop reason: HOSPADM

## 2021-12-28 RX ORDER — HYDROXYZINE HYDROCHLORIDE 25 MG/1
25 TABLET, FILM COATED ORAL 3 TIMES DAILY PRN
Status: DISCONTINUED | OUTPATIENT
Start: 2021-12-28 | End: 2021-12-29 | Stop reason: HOSPADM

## 2021-12-28 RX ORDER — SODIUM CHLORIDE 0.9 % (FLUSH) 0.9 %
10 SYRINGE (ML) INJECTION PRN
Status: DISCONTINUED | OUTPATIENT
Start: 2021-12-28 | End: 2021-12-29 | Stop reason: HOSPADM

## 2021-12-28 RX ORDER — DEXTROSE MONOHYDRATE 25 G/50ML
12.5 INJECTION, SOLUTION INTRAVENOUS PRN
Status: DISCONTINUED | OUTPATIENT
Start: 2021-12-28 | End: 2021-12-29 | Stop reason: HOSPADM

## 2021-12-28 RX ORDER — ALBUTEROL SULFATE 2.5 MG/3ML
2.5 SOLUTION RESPIRATORY (INHALATION) EVERY 6 HOURS PRN
Status: DISCONTINUED | OUTPATIENT
Start: 2021-12-28 | End: 2021-12-29 | Stop reason: HOSPADM

## 2021-12-28 RX ORDER — SODIUM CHLORIDE 0.9 % (FLUSH) 0.9 %
10 SYRINGE (ML) INJECTION EVERY 12 HOURS SCHEDULED
Status: DISCONTINUED | OUTPATIENT
Start: 2021-12-28 | End: 2021-12-29 | Stop reason: HOSPADM

## 2021-12-28 RX ORDER — ALPRAZOLAM 3 MG/1
3 TABLET, EXTENDED RELEASE ORAL EVERY MORNING
Status: DISCONTINUED | OUTPATIENT
Start: 2021-12-28 | End: 2021-12-28 | Stop reason: CLARIF

## 2021-12-28 RX ORDER — ACETAMINOPHEN 650 MG/1
650 SUPPOSITORY RECTAL EVERY 6 HOURS PRN
Status: DISCONTINUED | OUTPATIENT
Start: 2021-12-28 | End: 2021-12-29 | Stop reason: HOSPADM

## 2021-12-28 RX ORDER — ATORVASTATIN CALCIUM 40 MG/1
40 TABLET, FILM COATED ORAL NIGHTLY
Status: DISCONTINUED | OUTPATIENT
Start: 2021-12-28 | End: 2021-12-29 | Stop reason: HOSPADM

## 2021-12-28 RX ORDER — LISINOPRIL 20 MG/1
40 TABLET ORAL DAILY
Status: DISCONTINUED | OUTPATIENT
Start: 2021-12-28 | End: 2021-12-29 | Stop reason: HOSPADM

## 2021-12-28 RX ORDER — BUPROPION HYDROCHLORIDE 150 MG/1
150 TABLET ORAL EVERY MORNING
Status: DISCONTINUED | OUTPATIENT
Start: 2021-12-28 | End: 2021-12-29 | Stop reason: HOSPADM

## 2021-12-28 RX ADMIN — HEPARIN SODIUM 5000 UNITS: 5000 INJECTION INTRAVENOUS; SUBCUTANEOUS at 21:36

## 2021-12-28 RX ADMIN — HEPARIN SODIUM 3600 UNITS: 1000 INJECTION INTRAVENOUS; SUBCUTANEOUS at 18:00

## 2021-12-28 RX ADMIN — INSULIN GLARGINE 10 UNITS: 100 INJECTION, SOLUTION SUBCUTANEOUS at 09:35

## 2021-12-28 RX ADMIN — PREGABALIN 75 MG: 75 CAPSULE ORAL at 21:36

## 2021-12-28 RX ADMIN — ALPRAZOLAM 0.75 MG: 0.5 TABLET ORAL at 21:35

## 2021-12-28 RX ADMIN — Medication 10 ML: at 21:36

## 2021-12-28 RX ADMIN — HEPARIN SODIUM 5000 UNITS: 5000 INJECTION INTRAVENOUS; SUBCUTANEOUS at 11:07

## 2021-12-28 RX ADMIN — Medication 10 ML: at 09:13

## 2021-12-28 RX ADMIN — FLUVOXAMINE MALEATE 100 MG: 50 TABLET, COATED ORAL at 21:35

## 2021-12-28 RX ADMIN — TIOTROPIUM BROMIDE AND OLODATEROL 2 PUFF: 3.124; 2.736 SPRAY, METERED RESPIRATORY (INHALATION) at 09:18

## 2021-12-28 RX ADMIN — ALPRAZOLAM 0.75 MG: 0.5 TABLET ORAL at 07:50

## 2021-12-28 RX ADMIN — ATORVASTATIN CALCIUM 40 MG: 40 TABLET, FILM COATED ORAL at 21:36

## 2021-12-28 ASSESSMENT — PAIN SCALES - GENERAL
PAINLEVEL_OUTOF10: 0

## 2021-12-28 NOTE — PROGRESS NOTES
Occupational Therapy   Occupational Therapy Initial Assessment  Date: 2021   Patient Name: Olinda Rosas  MRN: [id number]     : 1975    Date of Service: 2021    Discharge Recommendations:  Olinda Rosas scored a 19/24 on the AM-PAC ADL Inpatient form. Current research shows that an AM-PAC score of 17 or less is typically not associated with a discharge to the patient's home setting. Based on the patient's AM-PAC score and their current ADL deficits, it is recommended that the patient have 5-7 sessions per week of Occupational Therapy at d/c to increase the patient's independence. At this time, this patient demonstrates the endurance, and/or tolerance for 3 hours of therapy each day, with a treatment frequency of 5-7x/wk. Please see assessment section for further patient specific details. If patient discharges prior to next session this note will serve as a discharge summary. Please see below for the latest assessment towards goals. 5-7 sessions per week  OT Equipment Recommendations  Equipment Needed: No    Assessment   Performance deficits / Impairments: Decreased functional mobility ; Decreased ADL status; Decreased strength;Decreased safe awareness;Decreased endurance;Decreased vision/visual deficit  Assessment: pt is below baseline function - reports increased number of falls over the last month or so, which she relates to medication changes - would benefit from continue therapy  Treatment Diagnosis: decreased ADLs/IADL/mobility/endurance  Prognosis: Good  Decision Making: Medium Complexity  OT Education: OT Role;Plan of Care;Precautions; ADL Adaptive Strategies;Transfer Training  Patient Education: pt would benefit from continued reinforcement  Barriers to Learning: vision  REQUIRES OT FOLLOW UP: Yes  Activity Tolerance  Activity Tolerance: Patient limited by fatigue  Activity Tolerance: standing activity and ambulation distance limited by fatigue and feeling week in B LEs  Safety Devices  Safety Devices in place: Yes  Type of devices: Call light within reach; Bed alarm in place; Left in bed;Nurse notified  Restraints  Initially in place: No           Patient Diagnosis(es): The primary encounter diagnosis was General weakness. Diagnoses of Multiple falls, ESRD on hemodialysis ([address] and Hyperkalemia were also pertinent to this visit. has a past medical history of Blind in both eyes, Cerebral artery occlusion with cerebral infarction ([address] CHF (congestive heart failure) ([address] Chronic kidney disease, Depression, Diabetes mellitus out of control ([address] Diabetes mellitus, type II ([address] Diabetic neuropathy associated with type 2 diabetes mellitus ([facility] [address] Generalized headaches, Hypertension, Infertility, Insomnia, Migraine headache, Mixed hyperlipidemia, Otitis media, Pelvic abscess in female, Pneumonia, Stroke [facility]), and Stroke ([facility] [address] has a past surgical history that includes Cervix surgery; eye surgery; Foot surgery (Right); Foot surgery (Bilateral); Foot surgery (Left); and IR TUNNELED CVC PLACE WO SQ PORT/PUMP > 5 YEARS (9/7/2021). Treatment Diagnosis: decreased ADLs/IADL/mobility/endurance      Restrictions  Restrictions/Precautions  Restrictions/Precautions: General Precautions,Fall Risk (high fall risk)  Position Activity Restriction  Other position/activity restrictions: Don Palacios is a 55 y.o. female with past medical history of CHF, previous CVA, chronic kidney disease on hemodialysis every Monday Wednesday Friday, hypertension, neuropathy, hyperlipidemia who presents to the ED with complaint of generalized weakness with multiple falls. Patient states she has had daily falls since 15 December. Patient states she does have some generalized weakness with multiple falls in the past secondary to her previous stroke. Patient states she walks with a walker.   Patient states she has never fallen this much for this consistently    Subjective   General  Patient assessed for rehabilitation services?: Yes     Social/Functional History  Social/Functional History  Lives With: Spouse (able to provide 24/7 assist)  Type of Home:  (Condo, handicap accessible, ground floor)  Home Layout: One level  Home Access: Level entry  Bathroom Shower/Tub: Walk-in shower,Doors,Shower chair with back  [employer]: Standard (toilet raiser)  Bathroom Equipment: Toilet raiser,Grab bars in shower,Hand-held shower  Bathroom Accessibility: Accessible  Home Equipment: [employer] (Working on getting a Rollator)  ADL Assistance: Needs assistance (Spouse provides supervision for showers to make sure she gets all the soap off and assists with matching colors when pt is dressing)  Homemaking Assistance: Needs assistance (Pt assists with cooking; spouse does cooking and cleaning)  Ambulation Assistance: Needs assistance (using RW all the time now, requires some assist to navigate environment at times due to limited vision)  Transfer Assistance: Independent  Active : No  Patient's  Info:  drives  Occupation: Unemployed (in process of disability)  IADL Comments: Sleeps in flat bed  Additional Comments: Multiple falls prior to admission, pt able to get herself up from most falls indep. Pt reports she feels that she has been falling more since change in her medication a few months ago.        Objective        Orientation  Overall Orientation Status: Within Normal Limits     Balance  Sitting Balance:  (posterior lean in sitting with manual muscle testing)  ADL  Grooming: Supervision (in stance at sink)  UE Bathing: Stand by assistance;Setup (seated EOB)  LE Bathing: None  UE Dressing: Stand by assistance  LE Dressing: None  Toileting: None  Additional Comments: pt declines LB ADLs having already completed this AM - bathed EOB - stood at sink to complete grooming - CGA In room and hallway with RW for mobility        Bed mobility  Sit to Supine: Supervision  Scooting: Supervision  Comment: sitting EOB on arrival  Transfers  Sit to stand: Supervision  Stand to sit: Supervision     Cognition  Overall Cognitive Status: Exceptions  Arousal/Alertness: Delayed responses to stimuli  Following Commands: Follows multistep commands consistently  Attention Span: Appears intact  Memory: Appears intact  Safety Judgement: Decreased awareness of need for safety  Problem Solving: Assistance required to generate solutions;Assistance required to implement solutions  Initiation: Does not require cues  Sequencing: Does not require cues                 LUE AROM (degrees)  LUE AROM : WFL  RUE AROM (degrees)  RUE AROM : WFL                      Plan   Plan  Times per week: 3-5x per week  Times per day: Daily  Current Treatment Recommendations: Strengthening,ROM,Balance Training,Functional Mobility Training,Endurance Training,Safety Education & Training,Neuromuscular Re-education,Patient/Caregiver Education & Training,Equipment Evaluation, Education, & procurement,Self-Care / ADL             AM-PAC Score        AM-[facility] Inpatient Daily Activity Raw Score: 19 (12/28/21 1135)  AM-PAC Inpatient ADL T-Scale Score : 40.22 (12/28/21 1135)  ADL Inpatient CMS 0-100% Score: 42.8 (12/28/21 1135)  ADL Inpatient CMS G-Code Modifier : CK (12/28/21 1135)    Goals  Short term goals  Time Frame for Short term goals: discharge  Short term goal 1: SBA functional mobility with RW  Short term goal 2: SBA functional transfers to toilet  Short term goal 3: set up UB ADLs  Short term goal 4: set up LB ADLs  Patient Goals   Patient goals : \"to return home, to stop falling\"       Therapy Time   Individual Concurrent Group Co-treatment   Time In       0828   Time Out       0916   Minutes       48   Timed Code Treatment Minutes: 38 Minutes   Total minutes Vik Chaves 1428, OT   Valentino Lawler OTR/L [id number], 12/28/2021, 11:45 AM

## 2021-12-28 NOTE — H&P
History and Physical  Dr. Britt Lawson  12/27/2021    PCP: Mimi Blum    Cc:   Chief Complaint   Patient presents with   Angela Bettencourt Fall     fell at dialysis today, was putting her shoe on and fell, unsure if she hit her head, no LOC, denies pain anywhere       HPI:  Abraham Mercer is a 55 y.o. female who has a past medical history of Blind in both eyes, Cerebral artery occlusion with cerebral infarction ([address] CHF (congestive heart failure) ([address] Chronic kidney disease, Depression, Diabetes mellitus out of control ([address] Diabetes mellitus, type II ([address] Diabetic neuropathy associated with type 2 diabetes mellitus ([address] Generalized headaches, Hypertension, Infertility, Insomnia, Migraine headache, Mixed hyperlipidemia, Otitis media, Pelvic abscess in female, Pneumonia, Stroke ([address] and Stroke ([address] Patient presents with ESRD (end stage renal disease) ([address] HPI  (1-3 for expanded problem focused, ?4 for detailed/comprehensive)     55 y.o. female with past medical history of CHF, previous CVA, chronic kidney disease on hemodialysis every Monday Wednesday Friday, hypertension, neuropathy, hyperlipidemia who presents to the ED with complaint of generalized weakness with multiple falls. Patient states she has had daily falls since 15 December. Patient states she does have some generalized weakness with multiple falls in the past secondary to her previous stroke. Patient states she walks with a walker. Patient states she has never fallen this much for this consistently. Patient states today she was at dialysis and states she was putting her shoe on when she fell. States she did hit her head. Patient states she is had pain to her neck and low back. Patient states she did not receive dialysis today. She was sent to the ED for further evaluation and treatment of generalized weakness with multiple falls. She states she does produce small amount of urine.   She denies any decreased urinary output or changes in urinary output. She denies any changes in bowel movements. Denies abdominal pain, nausea/vomiting, chest pain or shortness of breath. She has had multiple admits this fall, medical chart reviewed  Unclear whether pt is able to take care of herself due to multiple falls    Problem list of hospitalization thus far: Active Hospital Problems    Diagnosis     ESRD (end stage renal disease) ([facility] [N18.6]     History of medication noncompliance [Z91.14]     Anemia [D64.9]     Type 2 diabetes mellitus, with long-term current use of insulin (HCC) [E11.9, Z79.4]     Mixed hyperlipidemia [E78.2]          Review of Systems: (1 system for EPF, 2-9 for detailed, 10+ for comprehensive)  Constitutional: Negative for chills and fever. HENT: Negative for dental problem, nosebleeds and rhinorrhea. Eyes: Negative for photophobia and visual disturbance. Respiratory: Negative for cough, chest tightness and shortness of breath. Cardiovascular: Negative for chest pain and leg swelling. Gastrointestinal: Negative for diarrhea, nausea and vomiting. Endocrine: Negative for polydipsia and polyphagia. Genitourinary: Negative for frequency, hematuria and urgency. Musculoskeletal: Negative for back pain and myalgias. Skin: Negative for rash. Allergic/Immunologic: Negative for food allergies. Neurological: Negative for dizziness, seizures, syncope and facial asymmetry. Hematological: Negative for adenopathy. Psychiatric/Behavioral: Negative for dysphoric mood. The patient is not nervous/anxious.              Past Medical History:   Past Medical History:   Diagnosis Date    Blind in both eyes     Cerebral artery occlusion with cerebral infarction ([facility]     CHF (congestive heart failure) (HCC)     Chronic kidney disease     Depression     Diabetes mellitus out of control ([facility]     Diabetes mellitus, type II ([facility]     2005    Diabetic neuropathy associated with type 2 diabetes mellitus ([facility]     Generalized headaches     Hypertension     Infertility     Insomnia     chronic vs lack of time spent to sleep    Migraine headache 11/09/2011    Mixed hyperlipidemia     Otitis media     h/o recurrent    Pelvic abscess in female 10/05/2013    Pneumonia     2004 approx.  Stroke ([facility] 08/27/2020    Stroke ([facility] 08/27/2021       Past Surgical History:   Past Surgical History:   Procedure Laterality Date    CERVIX SURGERY      laser tx for dysplasia;1992    EYE SURGERY      FOOT SURGERY Right     FOOT SURGERY Bilateral     FOOT SURGERY Left     IR TUNNELED CATHETER PLACEMENT GREATER THAN 5 YEARS  9/7/2021    IR TUNNELED CATHETER PLACEMENT GREATER THAN 5 YEARS 9/7/2021 Toma Bose MD [facility]       Social History:   Social History     Tobacco History     Smoking Status  Former Smoker Smoking Frequency  1 pack/day Smoking Tobacco Type  Cigarettes    Smokeless Tobacco Use  Never Used    Tobacco Comment  Quit in August 2021          Alcohol History     Alcohol Use Status  No Comment  Very Rare          Drug Use     Drug Use Status  No          Sexual Activity     Sexually Active  Yes Partners  Male                Fam History:   Family History   Problem Relation Age of Onset    Diabetes Mother    [facility] Other Mother 79        Covid    Diabetes Father     High Blood Pressure Father     Colon Cancer Father     Diabetes Sister     Alcohol Abuse Maternal Grandfather     Diabetes Paternal Grandmother     Alcohol Abuse Paternal Grandfather     Diabetes Paternal Aunt     Diabetes Paternal Uncle        PFSH: The above PMHx, PSHx, SocHx, FamHx has been reviewed by myself. (1 area for detailed, 2-3 for comprehensive)      Code Status: Prior    [facility]  Prior to Admission medications    Medication Sig Start Date End Date Taking?  Authorizing Provider   albuterol (PROVENTIL) 2.5 MG/0.5ML NEBU nebulizer solution Take 0.5 mLs by nebulization every 6 hours as needed for Wheezing or Shortness of Breath 12/16/21   Damon Mireles   furosemide (LASIX) 40 MG tablet  12/8/21   Historical Provider, MD   Heat Wraps [facility] BACK/HIP) MISC 1 each by Does not apply route daily as needed (back pain) 12/15/21   Damon Mireles   methyl salicylate-menthol (RANDI LEBLANC GREASELESS) 10-15 % CREA Apply topically 3 times daily as needed for Pain 12/15/21   Damon Mireles   buPROPion (WELLBUTRIN XL) 150 MG extended release tablet Take 1 tablet by mouth every morning 12/15/21   Damon Mireles   spironolactone (ALDACTONE) 50 MG tablet TAKE ONE TABLET BY MOUTH DAILY 12/10/21   Damon Mireles   propranolol (INDERAL LA) 80 MG extended release capsule TAKE ONE CAPSULE BY MOUTH EVERY MORNING 12/10/21   Damon Mireles   pregabalin (LYRICA) 75 MG capsule Take 1 capsule by mouth nightly for 30 doses. 12/1/21 12/31/21  Damon Mireles   amLODIPine (NORVASC) 10 MG tablet  11/17/21   Historical Provider, MD   hydrOXYzine (ATARAX) 25 MG tablet  10/14/21   Historical Provider, MD   ALPRAZolam (XANAX XR) 3 MG extended release tablet Take 1 tablet by mouth every morning for 30 days. 11/30/21 12/30/21  Damon Mireles   benzonatate (TESSALON) 200 MG capsule Take 1 capsule by mouth every 8 hours as needed for Cough 11/30/21   Damon Mireles   albuterol sulfate  (90 Base) MCG/ACT inhaler Inhale 2 puffs into the lungs every 6 hours as needed for Wheezing or Shortness of Breath 11/30/21   Damon Mireles   fluvoxaMINE (LUVOX) 100 MG tablet Take 1 tablet by mouth nightly 11/30/21 12/30/21  Damon Mireles   Dulaglutide 1.5 MG/0.5ML SOPN Inject 1.5 mg into the skin once a week 11/30/21   Damon Mireles   cloNIDine (CATAPRES) 0.2 MG/24HR PTWK Place 1 patch onto the skin once a week 11/1/21   Aliza Hummingbird   Misc.  Devices (PULSE OXIMETER) MISC 1 each by Does not apply route every 6-8 hours as needed (shortness of breath) 11/1/21   Damon Mireles   Nasal Dilators (BREATHE RIGHT EXTRA STRENGTH) STRP 1 strip by Does not apply route nightly as needed (nasal congestion) 11/1/21   Damon Mireles   albuterol sulfate HFA (PROVENTIL HFA) 108 (90 Base) MCG/ACT inhaler Inhale 2 puffs into the lungs every 4 hours as needed for Wheezing 10/21/21   Walker Goins MD   insulin glargine (LANTUS SOLOSTAR) 100 UNIT/ML injection pen Inject 20 Units into the skin every morning     Historical Provider, MD   lisinopril (PRINIVIL;ZESTRIL) 40 MG tablet Take 40 mg by mouth daily    Historical Provider, MD   glycopyrrolate-formoterol (Camella [place]) 9-4.8 MCG/ACT AERO Inhale 2 puffs into the lungs 2 times daily 9/23/21   Damon Mireles   glucose (GLUTOSE) 40 % GEL Take 37.5 mLs by mouth as needed (low blood sugar) 9/13/21   Willow Barrios MD   atorvastatin (LIPITOR) 40 MG tablet Take 1 tablet by mouth nightly 7/8/21   Damon Mireles   Insulin Pen Needle 29G X 12.7MM MISC 1 each by Does not apply route daily 7/8/21   [facility]   aspirin 81 MG EC tablet Take 1 tablet by mouth daily 9/1/20   Liseth Escobar MD   insulin lispro, 1 Unit Dial, 100 UNIT/ML SOPN Inject 0-6 Units into the skin 3 times daily (with meals) **Corrective Low Dose Algorithm**  Glucose: Dose:               No Insulin  140-199 1 Unit  200-249 2 Units  250-299 3 Units  300-349 4 Units  350-399 5 Units  Over 399 6 Units 4/29/20   Kaelyn Raza MD         Allergies   Allergen Reactions    Amoxicillin Hives, Itching and Other (See Comments)     Tolerates cephalosporins  Patient tolerating cefazolin (ANCEF) as of October 11, 2018      Levofloxacin Anaphylaxis    Vancomycin Anaphylaxis, Hives and Shortness Of Breath    Tape [Adhesive Tape] Other (See Comments)     Paper tape turns skin bright red. Plastic tape okay.               EXAM: (2-7 system for EPF/Detailed, ?8 for Comprehensive)  BP (!) 159/86   Pulse 93   Temp 98 °F (36.7 °C)   Resp 16   SpO2 99% Constitutional: vitals as above: alert, appears stated age and cooperative    Psychiatric: normal insight and judgment, oriented to person, place, time, and general circumstances    Head: Normocephalic, without obvious abnormality, atraumatic    Eyes:lids and lashes normal, conjunctivae and sclerae normal and pupils equal, round, reactive to light and accomodation    EMNT: external ears normal, nares midline    Neck: no carotid bruit, supple, symmetrical, trachea midline and thyroid not enlarged, symmetric, no tenderness/mass/nodules     Respiratory: clear to auscultation and percussion bilaterally with normal respiratory effort    Cardiovascular: normal rate, regular rhythm, normal S1 and S2 and no murmurs    Gastrointestinal: soft, non-tender, non-distended, normal bowel sounds, no masses or organomegaly    Extremities: no clubbing, trace edema  Skin:No rashes or nodules noted.     Neurologic:negative         LABS:  Labs Reviewed   CBC WITH AUTO DIFFERENTIAL - Abnormal; Notable for the following components:       Result Value    Hemoglobin 11.3 (*)     Hematocrit 35.6 (*)     RDW 17.4 (*)     All other components within normal limits    Narrative:     Performed at:  [facility] [address], [place], [facility] [place]   Phone [phone]   COMPREHENSIVE METABOLIC PANEL W/ REFLEX TO MG FOR LOW K - Abnormal; Notable for the following components:    Potassium reflex Magnesium 5.9 (*)     Glucose 133 (*)     BUN 56 (*)     CREATININE 7.0 (*)     GFR Non- 6 (*)     GFR African American 8 (*)     Alkaline Phosphatase 194 (*)     All other components within normal limits    Narrative:     La Nena Paulino  [facility] tel. [phone],  Chemistry results called to and read back by marian almanza, 12/27/2021  16:27, by AURA  Performed at:  [facility]  [address] [facility], [facility] [place]   Phone [phone]   TROPONIN - Abnormal; Notable for the following components:    Troponin 0.30 (*)     All other components within normal limits    Narrative:     Gracie Cruz  [facility] tel. [phone],  Chemistry results called to and read back by marian mares , 12/27/2021  16:27, by Micah Thrasher  Performed at:  [facility] [address],  [place]   Phone 21  - Abnormal; Notable for the following components:    Pro-BNP 2,333 (*)     All other components within normal limits    Narrative:     Gracie Cruz  [facility] tel. [phone],  Chemistry results called to and read back by marian mares , 12/27/2021  16:27, by Micah Thrasher  Performed at:  [facility] [address],  [place]   Phone [phone]   URINE RT REFLEX TO CULTURE - Abnormal; Notable for the following components:    Glucose, Ur 250 (*)     Blood, Urine SMALL (*)     Protein, UA >=300 (*)     All other components within normal limits    Narrative:     Performed at:  [facility] [address],  [facility], [employer]   Phone [phone]   MICROSCOPIC URINALYSIS - Abnormal; Notable for the following components:    Bacteria, UA 1+ (*)     RBC, UA 6 (*)     All other components within normal limits    Narrative:     Performed at:  [facility] [address],  [place]   Phone [phone]   POTASSIUM         IMAGING:  Imaging results from the ER have been reviewed in the computerized chart. CT HEAD WO CONTRAST    Result Date: 12/27/2021  EXAMINATION: CT OF THE HEAD WITHOUT CONTRAST  12/27/2021 4:50 pm TECHNIQUE: CT of the head was performed without the administration of intravenous contrast. Dose modulation, iterative reconstruction, and/or weight based adjustment of the mA/kV was utilized to reduce the radiation dose to as low as reasonably achievable.  COMPARISON: 08/27/2021 HISTORY: ORDERING SYSTEM PROVIDED HISTORY: falls TECHNOLOGIST PROVIDED HISTORY: Has a \"code stroke\" or \"stroke alert\" been called? ->No Reason for exam:->falls Decision Support Exception - unselect if not a suspected or confirmed emergency medical condition->Emergency Medical Condition ([place]) Is the patient pregnant?->No Reason for Exam: Fall, Fall (fell at dialysis today, was putting her shoe on and fell, unsure if she hit her head, no LOC, denies pain anywhere). FINDINGS: BRAIN/VENTRICLES: There is no acute intracranial hemorrhage, mass effect or midline shift. No abnormal extra-axial fluid collection. The gray-white differentiation is maintained without evidence of an acute infarct. There is no evidence of hydrocephalus. Patchy hypodensities in the periventricular and subcortical white matter, which are nonspecific, but may represent chronic small vessel ischemic change. ORBITS: The visualized portion of the orbits demonstrate no acute abnormality. SINUSES: The visualized paranasal sinuses and mastoid air cells demonstrate no acute abnormality. SOFT TISSUES/SKULL:  No acute abnormality of the visualized skull or soft tissues. No acute intracranial abnormality. CT CERVICAL SPINE WO CONTRAST    Result Date: 12/27/2021  EXAMINATION: CT OF THE CERVICAL SPINE WITHOUT CONTRAST 12/27/2021 4:49 pm TECHNIQUE: CT of the cervical spine was performed without the administration of intravenous contrast. Multiplanar reformatted images are provided for review. Dose modulation, iterative reconstruction, and/or weight based adjustment of the mA/kV was utilized to reduce the radiation dose to as low as reasonably achievable. COMPARISON: None.  HISTORY: ORDERING SYSTEM PROVIDED HISTORY: fall TECHNOLOGIST PROVIDED HISTORY: Reason for exam:->fall Decision Support Exception - unselect if not a suspected or confirmed emergency medical condition->Emergency Medical Condition (MA) Is the patient pregnant?->No Reason for Exam: Fall, Fall (fell at dialysis today, was putting her shoe on and fell, unsure if she hit her head, no LOC, denies pain anywhere). FINDINGS: BONES/ALIGNMENT: There is mild disc space narrowing throughout. There is no acute fracture or traumatic malalignment. DEGENERATIVE CHANGES: No significant degenerative changes. SOFT TISSUES: There is no prevertebral soft tissue swelling. There is a 2.5 cm hypodensity left lobe of thyroid gland inferiorly. There is a right IJ catheter in place. The lung apices are clear. Mild degenerative disc space narrowing throughout with no acute abnormality seen. 2.5 cm hypodensity left lobe of the thyroid gland. Suggest ultrasound follow-up. RECOMMENDATIONS: Unavailable     CT THORACIC SPINE WO CONTRAST    Result Date: 12/27/2021  EXAMINATION: CT OF THE THORACIC SPINE WITHOUT CONTRAST  12/27/2021 4:50 pm: TECHNIQUE: CT of the thoracic spine was performed without the administration of intravenous contrast. Multiplanar reformatted images are provided for review. Dose modulation, iterative reconstruction, and/or weight based adjustment of the mA/kV was utilized to reduce the radiation dose to as low as reasonably achievable. COMPARISON: None. HISTORY: ORDERING SYSTEM PROVIDED HISTORY: fall TECHNOLOGIST PROVIDED HISTORY: Reason for exam:->fall Is the patient pregnant?->No Reason for Exam: Fall, Fall (fell at dialysis today, was putting her shoe on and fell, unsure if she hit her head, no LOC, denies pain anywhere). FINDINGS: BONES/ALIGNMENT: There is mild scoliotic curvature of the thoraco lumbar spine, with dextroconvexity. .  The vertebral body heights are maintained. No osseous destructive lesion is seen. DEGENERATIVE CHANGES: Anterolateral spurring is noted at multiple levels. There is no significant narrowing of the central canal. SOFT TISSUES: No paraspinal mass is seen. No acute fracture or subluxation of the thoracic spine.  Multilevel spondylosis the RECOMMENDATIONS: Unavailable     CT LUMBAR SPINE WO stable. Right-sided central venous catheter extends to the right atrium. Lung volumes are low. No evidence of acute pulmonary edema or acute infiltrate. No pleural effusion or pneumothorax. Stable cardiomegaly. No acute cardiopulmonary process. EKG:   EKG from ER, reviewed by self - it shows normal sinus at 80  Old chart reviewed, EKG dated 11/2/21 is reviewed, there is not difference noted. Old study shows normal sinus at 80    Lab Results   Component Value Date    GLUCOSE 133 12/27/2021     Lab Results   Component Value Date    POCGLU 110 11/05/2021     BP (!) 159/86   Pulse 93   Temp 98 °F (36.7 °C)   Resp 16   SpO2 99%     MEDICAL DECISION MAKING:    Principal Problem:    ESRD (end stage renal disease) ([facility] -Established problem. Uncontrolled. Pt did not get dialysis today  Plan: admit, ask renal to see. Defer decision on emergent dialysis today vs waiting for tomorrow  Active Problems:    Type 2 diabetes mellitus, with long-term current use of insulin ([facility] -Established problem. Stable. Glu 133  Plan: Patient placed on controlled carbohydrate diet. Fingerstick sugars to be checked to monitor for both hypoglycemia as well as hyperglycemia. Sliding scale insulin ordered. Glucagon and dextrose ordered for hypoglycemia. Patient will be continued on home medications. Hemoglobin a1c to be ordered to assess efficacy of therapy. Mixed hyperlipidemia -Established problem. Stable. Plan: cont on home meds. Monitor for myalgias    Anemia -Established problem. Stable. 2/2 renal dz  Plan: No indication for transfusion. Cont to monitor h/h to assess progression of anemia. Recommend ferrous sulfate or MVI as outpatient. History of medication noncompliance          Diagnoses as listed above, designated as new or established and plan outlined for each. Data Reviewed:   (1) Lab tests were reviewed or ordered. (1) Radiology tests were reviewed or ordered.   (1) Medical test (Echo, EKG, PFT/ashley) were ordered. (1)History was not obtained from someone other than patient  (1) Old records were reviewed - see HPI/MDM for pertinent details if review done. (2) Case was discussed with another health care provider: [facility]  (2) Imaging was viewed by myself. (2) EKG  was viewed by myself. The patient is being placed in inpatient status with the expectation of requiring a hospital stay spanning at least two midnights for care and treatment of the problems noted in the problem list.  This determination is also based on thepatients comorbidities and past medical history, the severity and timing of the signs and symptoms upon presentation.     (Please note that portions of this note were completed with a voice recognition program.  Efforts were made to edit the dictations but occasionally words are mis-transcribed.)      Electronically signed by: Bienvenido Stone MD 12/27/2021

## 2021-12-28 NOTE — PLAN OF CARE
Problem: Falls - Risk of:  Goal: Will remain free from falls  Description: Will remain free from falls  Outcome: Ongoing     Problem: Falls - Risk of:  Goal: Absence of physical injury  Description: Absence of physical injury  Outcome: Ongoing       A&O x4, up to bathroom with walker and SBA. Uses call light for needs. HD planned for later today.

## 2021-12-28 NOTE — RT PROTOCOL NOTE
RT Inhaler-Nebulizer Bronchodilator Protocol Note    There is a bronchodilator order in the chart from a provider indicating to follow the RT Bronchodilator Protocol and there is an Initiate RT Inhaler-Nebulizer Bronchodilator Protocol order as well (see protocol at bottom of note). CXR Findings:  XR CHEST PORTABLE    Result Date: 12/27/2021  Stable cardiomegaly. No acute cardiopulmonary process. The findings from the last RT Protocol Assessment were as follows:   History Pulmonary Disease: Chronic pulmonary disease  Respiratory Pattern: Regular pattern and RR 12-20 bpm  Breath Sounds: Clear breath sounds  Cough: Strong, spontaneous, non-productive  Indication for Bronchodilator Therapy:    Bronchodilator Assessment Score: 2    Aerosolized bronchodilator medication orders have been revised according to the RT Inhaler-Nebulizer Bronchodilator Protocol below. Respiratory Therapist to perform RT Therapy Protocol Assessment initially then follow the protocol. Repeat RT Therapy Protocol Assessment PRN for score 0-3 or on second treatment, BID, and PRN for scores above 3. No Indications - adjust the frequency to every 6 hours PRN wheezing or bronchospasm, if no treatments needed after 48 hours then discontinue using Per Protocol order mode. If indication present, adjust the RT bronchodilator orders based on the Bronchodilator Assessment Score as indicated below. Use Inhaler orders unless patient has one or more of the following: on home nebulizer, not able to hold breath for 10 seconds, is not alert and oriented, cannot activate and use MDI correctly, or respiratory rate 25 breaths per minute or more, then use the equivalent nebulizer order(s) with same Frequency and PRN reasons based on the score. If a patient is on this medication at home then do not decrease Frequency below that used at home.     0-3 - enter or revise RT bronchodilator order(s) to equivalent RT Bronchodilator order with Frequency of every 4 hours PRN for wheezing or increased work of breathing using Per Protocol order mode. 4-6 - enter or revise RT Bronchodilator order(s) to two equivalent RT bronchodilator orders with one order with BID Frequency and one order with Frequency of every 4 hours PRN wheezing or increased work of breathing using Per Protocol order mode. 7-10 - enter or revise RT Bronchodilator order(s) to two equivalent RT bronchodilator orders with one order with TID Frequency and one order with Frequency of every 4 hours PRN wheezing or increased work of breathing using Per Protocol order mode. 11-13 - enter or revise RT Bronchodilator order(s) to one equivalent RT bronchodilator order with QID Frequency and an Albuterol order with Frequency of every 4 hours PRN wheezing or increased work of breathing using Per Protocol order mode. Greater than 13 - enter or revise RT Bronchodilator order(s) to one equivalent RT bronchodilator order with every 4 hours Frequency and an Albuterol order with Frequency of every 2 hours PRN wheezing or increased work of breathing using Per Protocol order mode. RT to enter RT Home Evaluation for COPD & MDI Assessment order using Per Protocol order mode.     Electronically signed by Frieda Guthrie RCP on 12/28/2021 at 6:32 AM

## 2021-12-28 NOTE — PROGRESS NOTES
MD Gorge Mitchell MD Merril Pitt, MD                                  Office: [phone]                 Fax: [phone]          [facility]                    NEPHROLOGY  HEMO-DIALYSIS PROGRESS NOTE:     PATIENT NAME: Olinda Rosas  : 1975  MRN: [id number]        Indication for Dialysis  ESRD  Patient seen on dialysis treatment. Tolerating treatment  Fairly well    Patient will have repeat hemodialysis treatment tomorrow. If stable discharge after dialysis        Vitals:    21 1456   BP: (!) 131/47   Pulse: 89   Resp: 20   Temp: 97.6 °F (36.4 °C)   SpO2:        Neck. JVD visible  Cardiac No pericardial rub. Flow murmur. Chest: Bilateral Rales. No rhonchi  Ext :  Edema mild    Labs Reviewed  by me   Labs   Lab Results   Component Value Date    CREATININE 7.4 (HH) 2021    BUN 57 (H) 2021     2021    K 5.8 (H) 2021     2021    CO2 22 2021     Lab Results   Component Value Date    WBC 8.0 2021    HGB 11.0 (L) 2021    HCT 34.8 (L) 2021    MCV 83.4 2021     2021       Dialysis Treatment and Prescription reviewed          RX:  See dialysis flowsheet for specifics on access, blood flow rate, dialysate baths, duration of dialysis, anticoagulation and other technical information. COMMENTS:  stable on dialysis. Continue to Target dry weight and clearance. Monitor closely for any hypotension    Dialysis treatment plan and dialysis orders discussed with dialysis RN @ bedside    Tolerating dialysis well, with no complications. Good flow access. Anemia. Stable. Continue Aransep as per protocol. Fluid Overload. Stable  Fluid removal as tolerated with dialysis. Stable from Renal.  Next dialysis treatment repeat tomorrow. Not ready for discharge.   Discussed with Patient

## 2021-12-28 NOTE — ED NOTES
Report given to 3A RN. No further questions at this time. Pt awaiting transport to floor at this time.       Carolynn Bruce RN  12/27/21 [id number]

## 2021-12-28 NOTE — PROGRESS NOTES
12/28/21 2809   RT Protocol   History Pulmonary Disease 2   Respiratory pattern 0   Breath sounds 0   Cough 0   Bronchodilator Assessment Score 2

## 2021-12-28 NOTE — CONSULTS
MD Pierre Lock MD Brown Peels, MD                                  Office: [phone]                 Fax: [phone]          [facility] [employer] [facility]                     NEPHROLOGY CONSULTATION NOTE:     PATIENT NAME: Luis Cui  : 1975  MRN: [id number]      Name:  Luis Cui Date/Time of Admission: 2021  2:36 PM    CSN: [id number] Attending Provider: Monet De La Cruz MD   Room/Bed: hospitals9820/9574-30 : 1975 Age: 55 y.o. Reason for Nephrology consult. Evaluation of patient with ESRD  dependent on dialysis, admitted with generalized weakness and history of fall      History of Presenting complaint. Duke Ramirez,is 55 y.o. of age,  And is known to have ESRD dependent on dialysis. Patient has regular hemodialysis treatment on . At hemodialysis unit. Patient has indwelling tunneled dialysis catheter. last hemodialysis treatment was  2 days ago. Patient with a history of fall in the dialysis unit today. Patient complains of frequent falls. She is legally blind. She feels weak overall. But no focal weakness. Previous history of CVA. She did not have her dialysis treatment yesterday. Initial imaging is negative in the ER. She has a tunneled hemodialysis catheter. Medications reviewed. Medical records reviewed    Past Medical History.     Past Medical History:   Diagnosis Date    Blind in both eyes     Cerebral artery occlusion with cerebral infarction ([address]     CHF (congestive heart failure) (HCC)     Chronic kidney disease     Depression     Diabetes mellitus out of control ([address]     Diabetes mellitus, type II ([address]         Diabetic neuropathy associated with type 2 diabetes mellitus ([address]     Generalized headaches     Hypertension     Infertility     Insomnia     chronic vs lack of time spent to sleep    Migraine headache 2011    Mixed hyperlipidemia     Otitis media     h/o recurrent    Pelvic abscess in female 10/05/2013    Pneumonia     2004 approx.  Stroke ([facility] 08/27/2020    Stroke ([facility] [address] 08/27/2021       Medications  Which i have  Reviewed, also have reviewed home medications  Prior to Admission medications    Medication Sig Start Date End Date Taking? Authorizing Provider   albuterol (PROVENTIL) 2.5 MG/0.5ML NEBU nebulizer solution Take 0.5 mLs by nebulization every 6 hours as needed for Wheezing or Shortness of Breath 12/16/21  Yes Damon Mireles   furosemide (LASIX) 40 MG tablet  12/8/21  Yes Historical Provider, MD   [facility] EUSEBIA BACK/HIP) MISC 1 each by Does not apply route daily as needed (back pain) 12/15/21  Yes Damon Mireles   methyl salicylate-menthol (RANDI LEBLANC GREASELESS) 10-15 % CREA Apply topically 3 times daily as needed for Pain 12/15/21  Yes Damon Mireles   buPROPion (WELLBUTRIN XL) 150 MG extended release tablet Take 1 tablet by mouth every morning 12/15/21  Yes Damon Mireles   spironolactone (ALDACTONE) 50 MG tablet TAKE ONE TABLET BY MOUTH DAILY 12/10/21  Yes Damon Mireles   propranolol (INDERAL LA) 80 MG extended release capsule TAKE ONE CAPSULE BY MOUTH EVERY MORNING 12/10/21  Yes Damon Mireles   pregabalin (LYRICA) 75 MG capsule Take 1 capsule by mouth nightly for 30 doses. 12/1/21 12/31/21 Yes Damon Mireles   amLODIPine (NORVASC) 10 MG tablet  11/17/21  Yes Historical Provider, MD   hydrOXYzine (ATARAX) 25 MG tablet  10/14/21  Yes Historical Provider, MD   ALPRAZolam (XANAX XR) 3 MG extended release tablet Take 1 tablet by mouth every morning for 30 days.  11/30/21 12/30/21 Yes Damon Mireles   benzonatate (TESSALON) 200 MG capsule Take 1 capsule by mouth every 8 hours as needed for Cough 11/30/21  Yes Damon Mireles   albuterol sulfate  (90 Base) MCG/ACT inhaler Inhale 2 puffs into the lungs every 6 hours as needed for Wheezing or Shortness of Breath 11/30/21  Yes Jehovah's witness Docena   fluvoxaMINE (LUVOX) 100 MG tablet Take 1 tablet by mouth nightly 11/30/21 12/30/21 Yes Damon Mireles   cloNIDine (CATAPRES) 0.2 MG/24HR PTWK Place 1 patch onto the skin once a week 11/1/21  Yes Juanita Sandoval.  Devices (PULSE OXIMETER) MISC 1 each by Does not apply route every 6-8 hours as needed (shortness of breath) 11/1/21  Yes Damon Mireles   Nasal Dilators (BREATHE RIGHT EXTRA STRENGTH) STRP 1 strip by Does not apply route nightly as needed (nasal congestion) 11/1/21  Yes Damon Mireles   albuterol sulfate HFA (PROVENTIL HFA) 108 (90 Base) MCG/ACT inhaler Inhale 2 puffs into the lungs every 4 hours as needed for Wheezing 10/21/21  Yes Carmina Garcia MD   insulin glargine (LANTUS SOLOSTAR) 100 UNIT/ML injection pen Inject 20 Units into the skin every morning    Yes Historical Provider, MD   lisinopril (PRINIVIL;ZESTRIL) 40 MG tablet Take 40 mg by mouth daily   Yes Historical Provider, MD   glycopyrrolate-formoterol (BEVESPI AEROSPHERE) 9-4.8 MCG/ACT AERO Inhale 2 puffs into the lungs 2 times daily 9/23/21  Yes Damon Mireles   glucose (GLUTOSE) 40 % GEL Take 37.5 mLs by mouth as needed (low blood sugar) 9/13/21  Yes Wade Echols MD   atorvastatin (LIPITOR) 40 MG tablet Take 1 tablet by mouth nightly 7/8/21  Yes Damon Mireles   Insulin Pen Needle 29G X 12.7MM MISC 1 each by Does not apply route daily 7/8/21  Yes Damon Mireles   aspirin 81 MG EC tablet Take 1 tablet by mouth daily 9/1/20  Yes Enrico Lake MD   insulin lispro, 1 Unit Dial, 100 UNIT/ML SOPN Inject 0-6 Units into the skin 3 times daily (with meals) **Corrective Low Dose Algorithm**  Glucose: Dose:               No Insulin  140-199 1 Unit  200-249 2 Units  250-299 3 Units  300-349 4 Units  350-399 5 Units  Over 399 6 Units 4/29/20  Yes Deepa Coates MD   Dulaglutide 1.5 MG/0.5ML SOPN Inject 1.5 mg into the skin once a week 11/30/21   Damon Mireles     Current Facility-Administered Medications: aspirin EC tablet 81 mg, 81 mg, Oral, Daily  atorvastatin (LIPITOR) tablet 40 mg, 40 mg, Oral, Nightly  glucose (GLUTOSE) 40 % oral gel 15 g, 15 g, Oral, PRN  tiotropium-olodaterol (STIOLTO) 2.5-2.5 MCG/ACT inhaler 2 puff, 2 puff, Inhalation, Daily  insulin glargine (LANTUS;BASAGLAR) injection pen 20 Units, 20 Units, SubCUTAneous, QAM  lisinopril (PRINIVIL;ZESTRIL) tablet 40 mg, 40 mg, Oral, Daily  [START ON 1/3/2022] cloNIDine (CATAPRES) 0.2 MG/24HR 1 patch, 1 patch, TransDERmal, Weekly  amLODIPine (NORVASC) tablet 10 mg, 10 mg, Oral, Daily  hydrOXYzine (ATARAX) tablet 25 mg, 25 mg, Oral, TID PRN  benzonatate (TESSALON) capsule 200 mg, 200 mg, Oral, Q8H PRN  fluvoxaMINE (LUVOX) tablet 100 mg, 100 mg, Oral, Nightly  Dulaglutide SOPN 1.5 mg - PATIENT SUPPLIED, 1.5 mg, SubCUTAneous, Weekly  pregabalin (LYRICA) capsule 75 mg, 75 mg, Oral, Nightly  spironolactone (ALDACTONE) tablet 50 mg, 50 mg, Oral, Daily  propranolol (INDERAL LA) extended release capsule 80 mg, 80 mg, Oral, Daily  furosemide (LASIX) tablet 40 mg, 40 mg, Oral, Daily  buPROPion (WELLBUTRIN XL) extended release tablet 150 mg, 150 mg, Oral, QAM  albuterol (PROVENTIL) nebulizer solution 2.5 mg, 2.5 mg, Nebulization, Q6H PRN  sodium chloride flush 0.9 % injection 10 mL, 10 mL, IntraVENous, 2 times per day  sodium chloride flush 0.9 % injection 10 mL, 10 mL, IntraVENous, PRN  0.9 % sodium chloride infusion, 25 mL, IntraVENous, PRN  ondansetron (ZOFRAN-ODT) disintegrating tablet 4 mg, 4 mg, Oral, Q8H PRN **OR** ondansetron (ZOFRAN) injection 4 mg, 4 mg, IntraVENous, Q6H PRN  acetaminophen (TYLENOL) tablet 650 mg, 650 mg, Oral, Q6H PRN **OR** acetaminophen (TYLENOL) suppository 650 mg, 650 mg, Rectal, Q6H PRN  hydrALAZINE (APRESOLINE) injection 10 mg, 10 mg, IntraVENous, Q6H PRN  0.9 % sodium chloride bolus, 500 mL, IntraVENous, PRN  insulin lispro (1 Unit Dial) 0-12 Units, 0-12 Units, SubCUTAneous, TID WC  insulin lispro (1 Unit Dial) 0-6 Units, 0-6 Units, SubCUTAneous, Nightly  glucose (GLUTOSE) 40 % oral gel 15 g, 15 g, Oral, PRN  dextrose 50 % IV solution, 12.5 g, IntraVENous, PRN  glucagon (rDNA) injection 1 mg, 1 mg, IntraMUSCular, PRN  dextrose 5 % solution, 100 mL/hr, IntraVENous, PRN  ALPRAZolam (XANAX) tablet 0.75 mg, 0.75 mg, Oral, Q6H PRN  heparin (porcine) injection 5,000 Units, 5,000 Units, SubCUTAneous, BID   sodium chloride      dextrose           Allergies. Allergies   Allergen Reactions    Amoxicillin Hives, Itching and Other (See Comments)     Tolerates cephalosporins  Patient tolerating cefazolin (ANCEF) as of October 11, 2018      Levofloxacin Anaphylaxis    Vancomycin Anaphylaxis, Hives and Shortness Of Breath    Tape [Adhesive Tape] Other (See Comments)     Paper tape turns skin bright red. Plastic tape okay. Social History. Social History     Socioeconomic History    Marital status:      Spouse name: Baron Tompkins Number of children: 0    Years of education: Not on file    Highest education level: Not on file   Occupational History    Occupation: works as    Tobacco Use    Smoking status: Former Smoker     Packs/day: 1.00     Types: Cigarettes    Smokeless tobacco: Never Used    Tobacco comment: Quit in August 2021   Vaping Use    Vaping Use: Never used   Substance and Sexual Activity    Alcohol use: No     Comment: Very Rare    Drug use: No    Sexual activity: Yes     Partners: Male   Other Topics Concern    Not on file   Social History Narrative    Not on file     Social Determinants of Health     Financial Resource Strain:     Difficulty of Paying Living Expenses: Not on file   Food Insecurity:     Worried About Running Out of Food in the Last Year: Not on file    Chris of Food in the Last Year: Not on file   Transportation Needs:     Lack of Transportation (Medical): Not on file    Lack of Transportation (Non-Medical):  Not on file   Physical Activity:     Days of Exercise per Week: Not on file    Minutes of Exercise per Session: Not on file   Stress:     Feeling of Stress : Not on file   Social Connections:     Frequency of Communication with Friends and Family: Not on file    Frequency of Social Gatherings with Friends and Family: Not on file    Attends Oriental orthodox Services: Not on file    Active Member of Clubs or Organizations: Not on file    Attends Club or Organization Meetings: Not on file    Marital Status: Not on file   Intimate Partner Violence:     Fear of Current or Ex-Partner: Not on file    Emotionally Abused: Not on file    Physically Abused: Not on file    Sexually Abused: Not on file   Housing Stability:     Unable to Pay for Housing in the Last Year: Not on file    Number of [place] in the Last Year: Not on file    Unstable Housing in the Last Year: Not on file       Family History    Family History   Problem Relation Age of Onset    Diabetes Mother    Julia Lamar Other Mother 79        Covid    Diabetes Father     High Blood Pressure Father     Colon Cancer Father     Diabetes Sister     Alcohol Abuse Maternal Grandfather     Diabetes Paternal Grandmother     Alcohol Abuse Paternal Grandfather     Diabetes Paternal Aunt     Diabetes Paternal Uncle        Review of Systems. I have reviewed in detail the 10 system review with admitting physician and other consultants on the case      PHYSICAL EXAMINATION. /82   Pulse 88   Temp 98.6 °F (37 °C) (Oral)   Resp 18   Ht 5' 7\" (1.702 m)   Wt 192 lb 14.4 oz (87.5 kg)   SpO2 98%   BMI 30.21 kg/m²     Intake/Output Summary (Last 24 hours) at 12/28/2021 1221  Last data filed at 12/28/2021 0951  Gross per 24 hour   Intake 240 ml   Output --   Net 240 ml       INTAKE/OUTPUT:  No intake/output data recorded.   I/O this shift:  In: 240 [P.O.:240]  Out: -       Vitals:    12/28/21 0918   BP:    Pulse:    Resp: 18   Temp:    SpO2: 98%       Intake/Output Summary (Last 24 hours) at 12/28/2021 1221  Last data filed at 12/28/2021 0951  Gross per 24 hour   Intake 240 ml   Output --   Net 240 ml        Ill Appearing. mild respiratory distress. Awake alert   no hypotension  External exam of the ears and nose are normal  HENT: exam is normal  Eyes: Pupils are equal, round, and reactive to light. Lymph Nodes. No axillary or cervical lymph nodes are palpable. Neck. JVD not visible. No lymph nodes palpable. CVS.  Heart sounds are normal.Palpation of the heart is normal. No murmurs. No pericardial rub.  RS.dullness on percussion of the lower chest wall. Bilateral Basal rales. PA soft , bowel sounds are normal no distension and no tenderness to palpation. Skin No rash , No palpable nodules  Musculoskeletal: Normal range of motion. 1+ edema and no tenderness. CNS  No focal    Edemaimproved. More awake and alert. All pulses are well felt      Labs reviewed by me       CBC:   Recent Labs     12/27/21  1538 12/28/21  0602   WBC 10.8 8.0   HGB 11.3* 11.0*   HCT 35.6* 34.8*   MCV 83.0 83.4    193     BMP:   Recent Labs     12/27/21  1538 12/27/21  2222 12/28/21  0602     --  140   K 5.9* 4.8 5.8*   CL 99  --  102   CO2 23  --  22   BUN 56*  --  57*   CREATININE 7.0*  --  7.4*     Magnesium:   Lab Results   Component Value Date    MG 2.00 12/28/2021    MG 1.80 09/13/2021    MG 1.90 09/12/2021           ASSESSMENT AND PLAN    ESRD on hemodialysis-Monday Wednesday Friday. Uncontrolled hypertension. Mild fluid overload. Hypokalemia. History of fall. History of multiple falls. Legally blind. Diabetic type II retinopathy and nephropathy. History of CVA. PLAN     Patient will have urgent hemodialysis treatment today. Hypertension levels uncontrolled needs improvement and medication changed. Fluid Overload. Edema  and fluid overload is worse- need extra fluid removal during dialysis. Renal functions and electrolytes need close monitoring. Repeat chest xray in the morning. Electrolytes reviewed.  Potassium levels are elevated. Continue on  low potassium and renal diet. Magnesium levels reviewedand are stable. Medications reviewed and appropriate for level of kidney function. All Nephrotoxic medications have been discontinued. Antibiotic doses are appropriate for level of renal function. Not on antibiotics  Anemia levels are stable and need monitoring. Continue on Iron. Repeat Hb. is   Deconditioned and  Nutritional status needs to be improved. renal panel or CBC    CXR  daily weights and renal diet  Discussed with treating team. and  Discussed with RN   Multiple complex problems. and   Time spent 35 mins. Thanks for the consult . Electronically Signed:  Lucretia Beyer MD 12/28/2021    Arterial Blood Gasses  No results for input(s): PH, PCO2, PO2 in the last 72 hours.     Invalid input(s): [id number], INSPIREDO2    UA:  Recent Labs     12/27/21  1610   COLORU YELLOW   PHUR 7.5   WBCUA 4   RBCUA 6*   BACTERIA 1+*   CLARITYU Clear   SPECGRAV 1.013   LEUKOCYTESUR Negative   UROBILINOGEN 0.2   BILIRUBINUR Negative   BLOODU SMALL*   GLUCOSEU 250*       LIVER PROFILE:   Recent Labs     12/27/21  1538 12/28/21  0602   AST 18 21   ALT 15 15   BILITOT 0.3 0.3   ALKPHOS 194* 182*     PT/INR:    Lab Results   Component Value Date    PROTIME 11.3 09/07/2021    PROTIME 11.2 08/27/2021    PROTIME 10.6 08/27/2020    INR 1.00 09/07/2021    INR 0.99 08/27/2021    INR 0.91 08/27/2020     PTT:    Lab Results   Component Value Date    APTT 32.0 09/13/2021    APTT 29.1 09/12/2021    APTT 31.3 09/11/2021     NILSA:    Lab Results   Component Value Date    NILSA Negative 04/25/2020           RADIOLOGY:    CT HEAD WO CONTRAST    Result Date: 12/27/2021  EXAMINATION: CT OF THE HEAD WITHOUT CONTRAST  12/27/2021 4:50 pm TECHNIQUE: CT of the head was performed without the administration of intravenous contrast. Dose modulation, iterative reconstruction, and/or weight based adjustment of the mA/kV was utilized to reduce the radiation dose to as low as reasonably achievable. COMPARISON: 08/27/2021 HISTORY: ORDERING SYSTEM PROVIDED HISTORY: falls TECHNOLOGIST PROVIDED HISTORY: Has a \"code stroke\" or \"stroke alert\" been called? ->No Reason for exam:->falls Decision Support Exception - unselect if not a suspected or confirmed emergency medical condition->Emergency Medical Condition ([place]) Is the patient pregnant?->No Reason for Exam: Fall, Fall (fell at dialysis today, was putting her shoe on and fell, unsure if she hit her head, no LOC, denies pain anywhere). FINDINGS: BRAIN/VENTRICLES: There is no acute intracranial hemorrhage, mass effect or midline shift. No abnormal extra-axial fluid collection. The gray-white differentiation is maintained without evidence of an acute infarct. There is no evidence of hydrocephalus. Patchy hypodensities in the periventricular and subcortical white matter, which are nonspecific, but may represent chronic small vessel ischemic change. ORBITS: The visualized portion of the orbits demonstrate no acute abnormality. SINUSES: The visualized paranasal sinuses and mastoid air cells demonstrate no acute abnormality. SOFT TISSUES/SKULL:  No acute abnormality of the visualized skull or soft tissues. No acute intracranial abnormality. CT CERVICAL SPINE WO CONTRAST    Result Date: 12/27/2021  EXAMINATION: CT OF THE CERVICAL SPINE WITHOUT CONTRAST 12/27/2021 4:49 pm TECHNIQUE: CT of the cervical spine was performed without the administration of intravenous contrast. Multiplanar reformatted images are provided for review. Dose modulation, iterative reconstruction, and/or weight based adjustment of the mA/kV was utilized to reduce the radiation dose to as low as reasonably achievable. COMPARISON: None.  HISTORY: ORDERING SYSTEM PROVIDED HISTORY: fall TECHNOLOGIST PROVIDED HISTORY: Reason for exam:->fall Decision Support Exception - unselect if not a suspected or confirmed emergency medical condition->Emergency Medical Condition ([place]) Is the patient pregnant?->No Reason for Exam: Fall, Fall (fell at dialysis today, was putting her shoe on and fell, unsure if she hit her head, no LOC, denies pain anywhere). FINDINGS: BONES/ALIGNMENT: There is mild disc space narrowing throughout. There is no acute fracture or traumatic malalignment. DEGENERATIVE CHANGES: No significant degenerative changes. SOFT TISSUES: There is no prevertebral soft tissue swelling. There is a 2.5 cm hypodensity left lobe of thyroid gland inferiorly. There is a right IJ catheter in place. The lung apices are clear. Mild degenerative disc space narrowing throughout with no acute abnormality seen. 2.5 cm hypodensity left lobe of the thyroid gland. Suggest ultrasound follow-up. RECOMMENDATIONS: Unavailable     CT THORACIC SPINE WO CONTRAST    Result Date: 12/27/2021  EXAMINATION: CT OF THE THORACIC SPINE WITHOUT CONTRAST  12/27/2021 4:50 pm: TECHNIQUE: CT of the thoracic spine was performed without the administration of intravenous contrast. Multiplanar reformatted images are provided for review. Dose modulation, iterative reconstruction, and/or weight based adjustment of the mA/kV was utilized to reduce the radiation dose to as low as reasonably achievable. COMPARISON: None. HISTORY: ORDERING SYSTEM PROVIDED HISTORY: fall TECHNOLOGIST PROVIDED HISTORY: Reason for exam:->fall Is the patient pregnant?->No Reason for Exam: Fall, Fall (fell at dialysis today, was putting her shoe on and fell, unsure if she hit her head, no LOC, denies pain anywhere). FINDINGS: BONES/ALIGNMENT: There is mild scoliotic curvature of the thoraco lumbar spine, with dextroconvexity. .  The vertebral body heights are maintained. No osseous destructive lesion is seen. DEGENERATIVE CHANGES: Anterolateral spurring is noted at multiple levels. There is no significant narrowing of the central canal. SOFT TISSUES: No paraspinal mass is seen. No acute fracture or subluxation of the thoracic spine. Multilevel spondylosis the RECOMMENDATIONS: Unavailable     CT LUMBAR SPINE WO CONTRAST    Result Date: 12/27/2021  EXAMINATION: CT OF THE LUMBAR SPINE WITHOUT CONTRAST  12/27/2021 TECHNIQUE: CT of the lumbar spine was performed without the administration of intravenous contrast. Multiplanar reformatted images are provided for review. Adjustment of mA and/or kV according to patient size was utilized. Dose modulation, iterative reconstruction, and/or weight based adjustment of the mA/kV was utilized to reduce the radiation dose to as low as reasonably achievable. COMPARISON: None HISTORY: ORDERING SYSTEM PROVIDED HISTORY: fall TECHNOLOGIST PROVIDED HISTORY: Reason for exam:->fall Decision Support Exception - unselect if not a suspected or confirmed emergency medical condition->Emergency Medical Condition ([place]) Is the patient pregnant?->No Reason for Exam: Fall, Fall (fell at dialysis today, was putting her shoe on and fell, unsure if she hit her head, no LOC, denies pain anywhere). FINDINGS: BONES/ALIGNMENT: There is normal alignment of the spine. The vertebral body heights are maintained. No osseous destructive lesion is seen. DEGENERATIVE CHANGES: Mild facet arthropathy. Anterior marginal endplate spurs most pronounced at L1-L2. Otherwise minimal disc space disease identified. SOFT TISSUES/RETROPERITONEUM: No paraspinal mass is seen. There is a borderline retroperitoneal lymph nodes up to 9 mm short axis dimension. No evidence acute fracture traumatic malalignment of the lumbar spine. Borderline/upper limits normal lymph nodes within the retroperitoneum 9 mm short axis dimension. Please correlate with history and laboratory testing.  RECOMMENDATIONS: Unavailable     XR CHEST PORTABLE    Result Date: 12/27/2021  EXAMINATION: ONE X-RAY VIEW OF THE CHEST 12/27/2021 3:41 pm COMPARISON: November 2, 2021 HISTORY: ORDERING SYSTEM PROVIDED HISTORY:  Falls TECHNOLOGIST PROVIDED HISTORY: Reason for Exam:  Falls Reason for Exam:  Falls FINDINGS: The cardiomediastinal silhouette is enlarged but stable. Right-sided central venous catheter extends to the right atrium. Lung volumes are low. No evidence of acute pulmonary edema or acute infiltrate. No pleural effusion or pneumothorax. Stable cardiomegaly. No acute cardiopulmonary process. Imaging Results.   Chest X Ray reviwed by me          Electronically Signed:  Johanny Drew MD 12/28/2021

## 2021-12-28 NOTE — PROGRESS NOTES
Physical Therapy    Facility/Department: [facility] 3A NURSING  Initial Assessment    NAME: Saint Cull  : 1975  MRN: [id number]    Date of Service: 2021    Discharge Recommendations:      PT Equipment Recommendations  Equipment Needed: No  Other: Defer to next level of care (may benefit from Rollator)     Saint Cull scored a 17/24 on the AM-PAC short mobility form. Current research shows that an AM-PAC score of 17 or less is typically not associated with a discharge to the patient's home setting. Based on the patient's AM-PAC score and their current functional mobility deficits, it is recommended that the patient have 3-5 sessions per week of Physical Therapy at d/c to increase the patient's independence. Please see assessment section for further patient specific details. If patient discharges prior to next session this note will serve as a discharge summary. Please see below for the latest assessment towards goals. Assessment   Body structures, Functions, Activity limitations: Decreased functional mobility ; Decreased balance;Decreased endurance;Decreased strength  Assessment: Pt is a 56 yo female admitted to [facility] for ESRD, presenting with frequent falls at home. Pt requires CGA for safe ambulation with use of RW and fatigues quickly with short household distances. At this time the patient is functioning below her baseline and is at a high risk of further falls. Recommending continued skilled PT to safely progress tolerance to activity. Treatment Diagnosis: Decreased endurance and decreased tolerance to activity  Prognosis: Good  Decision Making: Medium Complexity  Exam: MMT, balance, functional mobility  Clinical Presentation: Evolving  PT Education: Goals;PT Role;Plan of Care;General Safety  Patient Education: Pt verbalized understanding  Barriers to Learning: Vision  REQUIRES PT FOLLOW UP: Yes  Activity Tolerance  Activity Tolerance: Patient limited by endurance; Patient limited by fatigue Patient Diagnosis(es): The primary encounter diagnosis was General weakness. Diagnoses of Multiple falls, ESRD on hemodialysis ([facility] [address] and Hyperkalemia were also pertinent to this visit. has a past medical history of Blind in both eyes, Cerebral artery occlusion with cerebral infarction ([address] CHF (congestive heart failure) ([address] Chronic kidney disease, Depression, Diabetes mellitus out of control ([facility] [address] Diabetes mellitus, type II ([address] Diabetic neuropathy associated with type 2 diabetes mellitus ([address] Generalized headaches, Hypertension, Infertility, Insomnia, Migraine headache, Mixed hyperlipidemia, Otitis media, Pelvic abscess in female, Pneumonia, Stroke [facility]), and Stroke ([facility] [address] has a past surgical history that includes Cervix surgery; eye surgery; Foot surgery (Right); Foot surgery (Bilateral); Foot surgery (Left); and IR TUNNELED CVC PLACE WO SQ PORT/PUMP > 5 YEARS (9/7/2021). Restrictions  Restrictions/Precautions  Restrictions/Precautions: General Precautions,Fall Risk (high fall risk)  Position Activity Restriction  Other position/activity restrictions: Lev Villalba is a 55 y.o. female with past medical history of CHF, previous CVA, chronic kidney disease on hemodialysis every Monday Wednesday Friday, hypertension, neuropathy, hyperlipidemia who presents to the ED with complaint of generalized weakness with multiple falls. Patient states she has had daily falls since 15 December. Patient states she does have some generalized weakness with multiple falls in the past secondary to her previous stroke. Patient states she walks with a walker. Patient states she has never fallen this much for this consistently     Vision/Hearing  Vision: Impaired (Strokes affected her vision + (B) macular degeneration, pt reports 60% of her vision remains in her (R) eye, blind in (L) eye.  Color differential is limited in (R) eye)  Hearing: Within functional limits       Subjective  General  Chart Reviewed: Yes  Patient assessed for rehabilitation services?: Yes  Family / Caregiver Present: No  Diagnosis: ESRD  Follows Commands: Within Functional Limits  General Comment  Comments: Pt sitting EOB upon therapist arrival. Agreeable to PT/OT nino.  Subjective  Subjective: Pt denies pain. Reports she prefers sitting EOB rather than up in a chair. Pain Screening  Patient Currently in Pain: Denies  Vital Signs  Patient Currently in Pain: Denies       Orientation  Orientation  Overall Orientation Status: Within Normal Limits     Social/Functional History  Social/Functional History  Lives With: Spouse (able to provide 24/7 assist)  Type of Home:  (Condo, handicap accessible, ground floor)  Home Layout: One level  Home Access: Level entry  Bathroom Shower/Tub: Walk-in shower,Doors,Shower chair with back  [employer]: Standard (toilet raiser)  Bathroom Equipment: Toilet raiser,Grab bars in shower,Hand-held shower  Bathroom Accessibility: Accessible  Home Equipment: [employer] (Working on getting a Rollator)  ADL Assistance: Needs assistance (Spouse provides supervision for showers to make sure she gets all the soap off and assists with matching colors when pt is dressing)  Homemaking Assistance: Needs assistance (Pt assists with cooking; spouse does cooking and cleaning)  Ambulation Assistance: Needs assistance (using RW all the time now, requires some assist to navigate environment at times due to limited vision)  Transfer Assistance: Independent  Active : No  Patient's  Info:  drives  Occupation: Unemployed (in process of disability)  IADL Comments: Sleeps in flat bed  Additional Comments: Multiple falls prior to admission (4-5 falls per day), pt able to get herself up from most falls indep. Pt reports she feels that she has been falling more since change in her medication a few months ago.      Cognition   WFL    Objective  AROM RLE (degrees)  RLE AROM: WFL  AROM LLE (degrees)  LLE AROM : WFL  Strength RLE  Strength RLE: WFL  Comment: 4+/5 hip flexion, knee flex/ext, ankle DF  Strength LLE  Strength LLE: WFL  Comment: 4+/5 hip flexion, knee flex/ext, ankle DF        Sensation  Overall Sensation Status: WNL     Bed mobility  Sit to Supine: Supervision  Scooting: Supervision  Comment: Sitting EOB on arrival     Transfers  Sit to Stand: Stand by assistance  Stand to sit: Stand by assistance  Comment: 2 trials from EOB     Ambulation  Ambulation?: Yes  Ambulation 1  Surface: level tile  Device: Rolling Walker  Assistance: Contact guard assistance  Quality of Gait: Decreased step length (B), decreased foot clearance bilaterally (L impaired more than R), moderate use of (B) UEs on RW, slow noble  Distance: 8 + 8 + 40 ft  Comments: Pt ambulated to/from sink, standing x5 minutes at sink for ADLs. Pt then required seated rest break before ambulating in hallway, pt fatigued quickly. Balance  Sitting - Static: Good  Sitting - Dynamic: Good;-  Standing - Static: Fair;+  Standing - Dynamic: Fair;+  Comments: Indep for static sitting balance. Supervision for dynamic sitting balance. SBA for static standing balance with unilateral UE support at sink x5 minutes. SBA-CGA for dynamic standing balance with (B) UE support. Other activity  See OT note for assist with dressing, bathing, and ADLs at sink. Plan   Plan  Times per week: 3-5x  Current Treatment Recommendations: Transfer Training,Strengthening,Endurance Training,Balance Training,Gait Training,Functional Mobility Training,Safety Education & Training,Home Exercise Program,Equipment Evaluation, Education, & procurement,Patient/Caregiver Education & Training  Safety Devices  Type of devices:  All fall risk precautions in place,Gait belt,Patient at risk for falls,Call light within reach,Left in bed,Nurse notified,Bed alarm in place    AM-PAC Score  AM-PAC Inpatient Mobility Raw Score : 17 (12/28/21 1154)  AM-PAC Inpatient T-Scale Score : 42.13 (12/28/21 1154)  Mobility Inpatient CMS 0-100% Score: 50.57 (12/28/21 1154)  Mobility Inpatient CMS G-Code Modifier : CK (12/28/21 1154)          Goals  Short term goals  Time Frame for Short term goals: Before discharge  Short term goal 1: Pt will complete bed mobility indep  Short term goal 2: Pt will complete sit<>stand Mod I  Short term goal 3: Pt will ambulate 50 ft with Rollator and supervision, cues for navigation prn due to vision  Patient Goals   Patient goals :  Fall less       Therapy Time   Individual Concurrent Group Co-treatment   Time In 0828         Time Out 0916         Minutes 48         Timed Code Treatment Minutes: [address], [place], DPT #[id number]

## 2021-12-28 NOTE — FLOWSHEET NOTE
12/28/21 1456 12/28/21 1756   Vital Signs   BP (!) 131/47 (!) 167/79   Temp 97.6 °F (36.4 °C) 98.2 °F (36.8 °C)   Pulse 89 91   Resp 20 20     Treatment time: 3 ho urs    Net UF: 1.5 L     Pre weight: 86.2 kg (standing scale)  Post weight: 85.7 kg (standing scale)  EDW: 85 kg (88 kg per outpt clinic)    Access used: RIJ HD Tunneled cath  Access function: No problems. Exit site red with sm white pustule, no drainage, Dr. Allyson Meyer aware. Medications or blood products given: None    Regular outpatient schedule: [facility] MWF    Summary of response to treatment: Tolerated tx with hypotension midtx, SBP 70's, UFG decreased from 3.2 L to 1.5 L, SBP improved to 100's. Copy of dialysis treatment record placed in chart, to be scanned into EMR. Report called to Yuan Magaña RN.

## 2021-12-28 NOTE — ED PROVIDER NOTES
I independently performed a history and physical on Ayse Chand. All diagnostic, treatment, and disposition decisions were made by myself in conjunction with the advanced practice provider. I have participated in the medical decision making and directed the treatment plan and disposition of the patient. For further details of [facility] emergency department encounter, please see the advanced practice provider's documentation. CHIEF COMPLAINT  Chief Complaint   Patient presents with    Fall     fell at dialysis today, was putting her shoe on and fell, unsure if she hit her head, no LOC, denies pain anywhere     Briefly, Ayse Chand is a 55 y.o. female  who presents to the ED complaining of frequent falls. She fell today at dialysis and did not get a room prior to being sent here. She feels generally weak but not focally weak anywhere. She has had a stroke in the past.  No fevers. FOCUSED PHYSICAL EXAMINATION  /82   Pulse 93   Temp 98 °F (36.7 °C)   Resp 16   SpO2 98%    Focused physical examination notable for no acute distress, well-appearing, well-nourished, normal speech and mentation without obvious facial droop, no obvious rash. No obvious cranial nerve deficits on my initial exam.   Diffuse mild C/T/L spine ttp without stepoff deformity bruising or inflammation. RRR, CTAB. Abd soft NTND. The 12 lead EKG was interpreted by me as follows:  Rate: normal with a rate of 88  Rhythm: sinus  Axis: normal  Intervals: normal AK, narrow QRS, normal QTc  ST segments: no ST elevations or depressions  T waves: no abnormal inversions  Non-specific T wave changes: not present  Prior EKG comparison: EKG dated 11/2/21 is not significantly different    MDM:    ED course was notable for frequent falls. Patient does feel generally weak. She was mildly hyperkalemic with no concerning EKG changes associated with it.   Nephrology was consulted, will administer medications at their direction and then recheck afterwards. Patient will be admitted. During the patient's ED course, the patient was given:  Medications   insulin regular (HUMULIN R;NOVOLIN R) injection 5 Units (has no administration in time range)   sodium zirconium cyclosilicate (LOKELMA) oral suspension 10 g (has no administration in time range)   dextrose 50 % IV solution (has no administration in time range)        CLINICAL IMPRESSION  1. General weakness    2. Multiple falls    3. ESRD on hemodialysis ([address]    4. Hyperkalemia        DISPOSITION  Sena Lomax was admitted in fair condition. The plan is to admit to the hospital at this time under the PCP's admitting service. Dr. Irlanda Gonzalez accepted the patient and will take over the patient's care. This chart was created using Dragon dictation software. Efforts were made by me to ensure accuracy, however some errors may be present due to limitations of this technology.            Julia Giraldo MD  12/27/21 2100

## 2021-12-28 NOTE — PROGRESS NOTES
Progress Note - Dr. Raz López - Internal Medicine  PCP: Edson Lancaster [address] [facility] [address] [facility]    Hospital Day: 1  Code Status: Full Code  Current Diet: ADULT DIET; Regular; 4 carb choices (60 gm/meal)        CC: follow up on medical issues    Subjective:   Burton Gonzalez is a 55 y.o. female. Pt seen and examined  Chart reviewed since last visit, labs and imaging below        Doing ok  Awaiting pt/ot eval  For HD this am    She denies chest pain, denies shortness of breath, denies nausea,  denies emesis. 10 system Review of Systems is reviewed with patient, and pertinent positives are noted in HPI above . Otherwise, Review of systems is negative. I have reviewed the patient's medical and social history in detail and updated the computerized patient record. To recap: She  has a past medical history of Blind in both eyes, Cerebral artery occlusion with cerebral infarction ([facility] [address] CHF (congestive heart failure) ([facility] [address] Chronic kidney disease, Depression, Diabetes mellitus out of control ([facility] [address] Diabetes mellitus, type II ([facility] [address] Diabetic neuropathy associated with type 2 diabetes mellitus ([facility] [address] Generalized headaches, Hypertension, Infertility, Insomnia, Migraine headache, Mixed hyperlipidemia, Otitis media, Pelvic abscess in female, Pneumonia, Stroke [facility]), and Stroke ([facility] [address] . She  has a past surgical history that includes Cervix surgery; eye surgery; Foot surgery (Right); Foot surgery (Bilateral); Foot surgery (Left); and IR TUNNELED CVC PLACE WO SQ PORT/PUMP > 5 YEARS (9/7/2021). . She  reports that she has quit smoking. Her smoking use included cigarettes. She smoked 1.00 pack per day. She has never used smokeless tobacco. She reports that she does not drink alcohol and does not use drugs. .        Active Hospital Problems    Diagnosis Date Noted    ESRD (end stage renal disease) ([facility] [address] [N18.6] 10/04/2021    History of medication noncompliance [Z91.14]     Anemia [D64.9] 10/05/2013    Type 2 diabetes mellitus, with long-term current use of insulin (HCC) [E11.9, Z79.4]     Mixed hyperlipidemia [E78.2]        Current Facility-Administered Medications: aspirin EC tablet 81 mg, 81 mg, Oral, Daily  atorvastatin (LIPITOR) tablet 40 mg, 40 mg, Oral, Nightly  glucose (GLUTOSE) 40 % oral gel 15 g, 15 g, Oral, PRN  tiotropium-olodaterol (STIOLTO) 2.5-2.5 MCG/ACT inhaler 2 puff, 2 puff, Inhalation, Daily  insulin glargine (LANTUS;BASAGLAR) injection pen 20 Units, 20 Units, SubCUTAneous, QAM  lisinopril (PRINIVIL;ZESTRIL) tablet 40 mg, 40 mg, Oral, Daily  [START ON 1/3/2022] cloNIDine (CATAPRES) 0.2 MG/24HR 1 patch, 1 patch, TransDERmal, Weekly  amLODIPine (NORVASC) tablet 10 mg, 10 mg, Oral, Daily  hydrOXYzine (ATARAX) tablet 25 mg, 25 mg, Oral, TID PRN  benzonatate (TESSALON) capsule 200 mg, 200 mg, Oral, Q8H PRN  fluvoxaMINE (LUVOX) tablet 100 mg, 100 mg, Oral, Nightly  Dulaglutide SOPN 1.5 mg - PATIENT SUPPLIED, 1.5 mg, SubCUTAneous, Weekly  pregabalin (LYRICA) capsule 75 mg, 75 mg, Oral, Nightly  spironolactone (ALDACTONE) tablet 50 mg, 50 mg, Oral, Daily  propranolol (INDERAL LA) extended release capsule 80 mg, 80 mg, Oral, Daily  furosemide (LASIX) tablet 40 mg, 40 mg, Oral, Daily  buPROPion (WELLBUTRIN XL) extended release tablet 150 mg, 150 mg, Oral, QAM  albuterol (PROVENTIL) nebulizer solution 2.5 mg, 2.5 mg, Nebulization, Q6H PRN  sodium chloride flush 0.9 % injection 10 mL, 10 mL, IntraVENous, 2 times per day  sodium chloride flush 0.9 % injection 10 mL, 10 mL, IntraVENous, PRN  0.9 % sodium chloride infusion, 25 mL, IntraVENous, PRN  ondansetron (ZOFRAN-ODT) disintegrating tablet 4 mg, 4 mg, Oral, Q8H PRN **OR** ondansetron (ZOFRAN) injection 4 mg, 4 mg, IntraVENous, Q6H PRN  acetaminophen (TYLENOL) tablet 650 mg, 650 mg, Oral, Q6H PRN **OR** acetaminophen (TYLENOL) suppository 650 mg, 650 mg, Rectal, Q6H PRN  hydrALAZINE (APRESOLINE) injection 10 mg, 10 mg, IntraVENous, Q6H PRN  0.9 % sodium chloride bolus, 500 mL, IntraVENous, PRN  insulin lispro (1 Unit Dial) 0-12 Units, 0-12 Units, SubCUTAneous, TID WC  insulin lispro (1 Unit Dial) 0-6 Units, 0-6 Units, SubCUTAneous, Nightly  glucose (GLUTOSE) 40 % oral gel 15 g, 15 g, Oral, PRN  dextrose 50 % IV solution, 12.5 g, IntraVENous, PRN  glucagon (rDNA) injection 1 mg, 1 mg, IntraMUSCular, PRN  dextrose 5 % solution, 100 mL/hr, IntraVENous, PRN  ALPRAZolam (XANAX) tablet 0.75 mg, 0.75 mg, Oral, Q6H PRN  heparin (porcine) injection 5,000 Units, 5,000 Units, SubCUTAneous, BID         Objective:  /82   Pulse 88   Temp 98.6 °F (37 °C) (Oral)   Resp 18   Ht 5' 7\" (1.702 m)   Wt 192 lb 14.4 oz (87.5 kg)   SpO2 95%   BMI 30.21 kg/m²      Patient Vitals for the past 24 hrs:   BP Temp Temp src Pulse Resp SpO2 Height Weight   12/28/21 0745 123/82 98.6 °F (37 °C) Oral 88 18 95 % -- --   12/28/21 0359 (!) 148/80 98.2 °F (36.8 °C) Oral 84 16 98 % -- --   12/28/21 0045 (!) 146/83 98 °F (36.7 °C) Oral 80 16 98 % 5' 7\" (1.702 m) 192 lb 14.4 oz (87.5 kg)   12/27/21 2343 -- -- -- 85 9 95 % -- --   12/27/21 2330 (!) 151/90 -- -- 84 9 94 % -- --   12/27/21 2300 (!) 157/87 -- -- 85 9 -- -- --   12/27/21 2245 (!) 145/88 -- -- 85 9 -- -- --   12/27/21 2230 (!) 151/88 -- -- 86 10 -- -- --   12/27/21 2215 (!) 139/90 -- -- 87 9 94 % -- --   12/27/21 2200 (!) 143/84 -- -- 87 9 92 % -- --   12/27/21 2145 (!) 141/83 -- -- 87 9 98 % -- --   12/27/21 2116 (!) 159/86 -- -- -- -- 99 % -- --   12/27/21 2100 (!) 152/86 -- -- -- -- 96 % -- --   12/27/21 2045 (!) 134/90 -- -- -- -- 99 % -- --   12/27/21 1443 109/82 98 °F (36.7 °C) -- 93 16 98 % -- --     Patient Vitals for the past 96 hrs (Last 3 readings):   Weight   12/28/21 0045 192 lb 14.4 oz (87.5 kg)         No intake or output data in the 24 hours ending 12/28/21 3418      Physical Exam:   Vitals as above  General appearance: alert, appears stated age and cooperative    Head: Normocephalic, without obvious abnormality, atraumatic    Lungs: clear to auscultation bilaterally    Heart: regular rate and rhythm, S1, S2 normal, no murmur    Abdomen: soft, non-tender; bowel sounds normal; no masses, no organomegaly    Extremities: extremities normal, atraumatic, no cyanosis, no edema      Labs:  Lab Results   Component Value Date    WBC 8.0 12/28/2021    HGB 11.0 (L) 12/28/2021    HCT 34.8 (L) 12/28/2021     12/28/2021    CHOL 164 02/24/2021    TRIG 319 (H) 08/31/2021    HDL 41 02/24/2021    LDLDIRECT 100 (H) 04/18/2011    ALT 15 12/28/2021    AST 21 12/28/2021     12/28/2021    K 5.8 (H) 12/28/2021     12/28/2021    CREATININE 7.4 (HH) 12/28/2021    BUN 57 (H) 12/28/2021    CO2 22 12/28/2021    INR 1.00 09/07/2021    LABA1C 5.2 11/02/2021    LABMICR YES 12/27/2021     Lab Results   Component Value Date    CKTOTAL 25 (L) 09/13/2021    TROPONINI 0.30 (H) 12/27/2021       Recent Imaging Results are Reviewed:  CT HEAD WO CONTRAST    Result Date: 12/27/2021  EXAMINATION: CT OF THE HEAD WITHOUT CONTRAST  12/27/2021 4:50 pm TECHNIQUE: CT of the head was performed without the administration of intravenous contrast. Dose modulation, iterative reconstruction, and/or weight based adjustment of the mA/kV was utilized to reduce the radiation dose to as low as reasonably achievable. COMPARISON: 08/27/2021 HISTORY: ORDERING SYSTEM PROVIDED HISTORY: falls TECHNOLOGIST PROVIDED HISTORY: Has a \"code stroke\" or \"stroke alert\" been called? ->No Reason for exam:->falls Decision Support Exception - unselect if not a suspected or confirmed emergency medical condition->Emergency Medical Condition (MA) Is the patient pregnant?->No Reason for Exam: Fall, Fall (fell at dialysis today, was putting her shoe on and fell, unsure if she hit her head, no LOC, denies pain anywhere). FINDINGS: BRAIN/VENTRICLES: There is no acute intracranial hemorrhage, mass effect or midline shift. No abnormal extra-axial fluid collection.   The gray-white differentiation is maintained without evidence of an acute infarct. There is no evidence of hydrocephalus. Patchy hypodensities in the periventricular and subcortical white matter, which are nonspecific, but may represent chronic small vessel ischemic change. ORBITS: The visualized portion of the orbits demonstrate no acute abnormality. SINUSES: The visualized paranasal sinuses and mastoid air cells demonstrate no acute abnormality. SOFT TISSUES/SKULL:  No acute abnormality of the visualized skull or soft tissues. No acute intracranial abnormality. CT CERVICAL SPINE WO CONTRAST    Result Date: 12/27/2021  EXAMINATION: CT OF THE CERVICAL SPINE WITHOUT CONTRAST 12/27/2021 4:49 pm TECHNIQUE: CT of the cervical spine was performed without the administration of intravenous contrast. Multiplanar reformatted images are provided for review. Dose modulation, iterative reconstruction, and/or weight based adjustment of the mA/kV was utilized to reduce the radiation dose to as low as reasonably achievable. COMPARISON: None. HISTORY: ORDERING SYSTEM PROVIDED HISTORY: fall TECHNOLOGIST PROVIDED HISTORY: Reason for exam:->fall Decision Support Exception - unselect if not a suspected or confirmed emergency medical condition->Emergency Medical Condition (MA) Is the patient pregnant?->No Reason for Exam: Fall, Fall (fell at dialysis today, was putting her shoe on and fell, unsure if she hit her head, no LOC, denies pain anywhere). FINDINGS: BONES/ALIGNMENT: There is mild disc space narrowing throughout. There is no acute fracture or traumatic malalignment. DEGENERATIVE CHANGES: No significant degenerative changes. SOFT TISSUES: There is no prevertebral soft tissue swelling. There is a 2.5 cm hypodensity left lobe of thyroid gland inferiorly. There is a right IJ catheter in place. The lung apices are clear. Mild degenerative disc space narrowing throughout with no acute abnormality seen.  2.5 cm hypodensity left lobe of the thyroid gland. Suggest ultrasound follow-up. RECOMMENDATIONS: Unavailable     CT THORACIC SPINE WO CONTRAST    Result Date: 12/27/2021  EXAMINATION: CT OF THE THORACIC SPINE WITHOUT CONTRAST  12/27/2021 4:50 pm: TECHNIQUE: CT of the thoracic spine was performed without the administration of intravenous contrast. Multiplanar reformatted images are provided for review. Dose modulation, iterative reconstruction, and/or weight based adjustment of the mA/kV was utilized to reduce the radiation dose to as low as reasonably achievable. COMPARISON: None. HISTORY: ORDERING SYSTEM PROVIDED HISTORY: fall TECHNOLOGIST PROVIDED HISTORY: Reason for exam:->fall Is the patient pregnant?->No Reason for Exam: Fall, Fall (fell at dialysis today, was putting her shoe on and fell, unsure if she hit her head, no LOC, denies pain anywhere). FINDINGS: BONES/ALIGNMENT: There is mild scoliotic curvature of the thoraco lumbar spine, with dextroconvexity. .  The vertebral body heights are maintained. No osseous destructive lesion is seen. DEGENERATIVE CHANGES: Anterolateral spurring is noted at multiple levels. There is no significant narrowing of the central canal. SOFT TISSUES: No paraspinal mass is seen. No acute fracture or subluxation of the thoracic spine. Multilevel spondylosis the RECOMMENDATIONS: Unavailable     CT LUMBAR SPINE WO CONTRAST    Result Date: 12/27/2021  EXAMINATION: CT OF THE LUMBAR SPINE WITHOUT CONTRAST  12/27/2021 TECHNIQUE: CT of the lumbar spine was performed without the administration of intravenous contrast. Multiplanar reformatted images are provided for review. Adjustment of mA and/or kV according to patient size was utilized. Dose modulation, iterative reconstruction, and/or weight based adjustment of the mA/kV was utilized to reduce the radiation dose to as low as reasonably achievable.  COMPARISON: None HISTORY: ORDERING SYSTEM PROVIDED HISTORY: fall TECHNOLOGIST PROVIDED HISTORY: Reason for exam:->fall Decision Support Exception - unselect if not a suspected or confirmed emergency medical condition->Emergency Medical Condition (MA) Is the patient pregnant?->No Reason for Exam: Fall, Fall (fell at dialysis today, was putting her shoe on and fell, unsure if she hit her head, no LOC, denies pain anywhere). FINDINGS: BONES/ALIGNMENT: There is normal alignment of the spine. The vertebral body heights are maintained. No osseous destructive lesion is seen. DEGENERATIVE CHANGES: Mild facet arthropathy. Anterior marginal endplate spurs most pronounced at L1-L2. Otherwise minimal disc space disease identified. SOFT TISSUES/RETROPERITONEUM: No paraspinal mass is seen. There is a borderline retroperitoneal lymph nodes up to 9 mm short axis dimension. No evidence acute fracture traumatic malalignment of the lumbar spine. Borderline/upper limits normal lymph nodes within the retroperitoneum 9 mm short axis dimension. Please correlate with history and laboratory testing. RECOMMENDATIONS: Unavailable     XR CHEST PORTABLE    Result Date: 12/27/2021  EXAMINATION: ONE X-RAY VIEW OF THE CHEST 12/27/2021 3:41 pm COMPARISON: November 2, 2021 HISTORY: ORDERING SYSTEM PROVIDED HISTORY:  Falls TECHNOLOGIST PROVIDED HISTORY: Reason for Exam:  Falls Reason for Exam:  Falls FINDINGS: The cardiomediastinal silhouette is enlarged but stable. Right-sided central venous catheter extends to the right atrium. Lung volumes are low. No evidence of acute pulmonary edema or acute infiltrate. No pleural effusion or pneumothorax. Stable cardiomegaly. No acute cardiopulmonary process.        Lab Results   Component Value Date    GLUCOSE 132 12/28/2021     Lab Results   Component Value Date    POCGLU 115 12/28/2021     /82   Pulse 88   Temp 98.6 °F (37 °C) (Oral)   Resp 18   Ht 5' 7\" (1.702 m)   Wt 192 lb 14.4 oz (87.5 kg)   SpO2 95%   BMI 30.21 kg/m²     Assessment and Plan:  Principal Problem:    ESRD (end stage renal disease) ([facility] [facility] -Established problem. Stable. Plan: for extra HD today. Normally MWF  Active Problems:    Type 2 diabetes mellitus, with long-term current use of insulin [facility]  Plan: Continue present orders/plan. Mixed hyperlipidemia -Established problem. Stable. Plan: Continue present orders/plan. Anemia -Established problem. Stable.  hgb 11.0  Plan: No indication for transfusion. Cont to monitor h/h to assess progression of anemia. Recommend ferrous sulfate or MVI as outpatient.      Frequent falls  Plan: await pt/ot eval      (Please note that portions of this note were completed with a voice recognition program.  Efforts were made to edit the dictations but occasionally words are mis-transcribed.)        Jose De Jesus Herrera MD  12/28/2021

## 2021-12-29 VITALS
HEIGHT: 67 IN | RESPIRATION RATE: 16 BRPM | DIASTOLIC BLOOD PRESSURE: 60 MMHG | WEIGHT: 187.9 LBS | SYSTOLIC BLOOD PRESSURE: 100 MMHG | BODY MASS INDEX: 29.49 KG/M2 | TEMPERATURE: 97.7 F | OXYGEN SATURATION: 97 % | HEART RATE: 100 BPM

## 2021-12-29 LAB
ANION GAP SERPL CALCULATED.3IONS-SCNC: 14 MMOL/L (ref 3–16)
BASOPHILS ABSOLUTE: 0.1 K/UL (ref 0–0.2)
BASOPHILS RELATIVE PERCENT: 0.6 %
BUN BLDV-MCNC: 32 MG/DL (ref 7–20)
CALCIUM SERPL-MCNC: 9.3 MG/DL (ref 8.3–10.6)
CHLORIDE BLD-SCNC: 93 MMOL/L (ref 99–110)
CO2: 25 MMOL/L (ref 21–32)
CREAT SERPL-MCNC: 5.2 MG/DL (ref 0.6–1.1)
EOSINOPHILS ABSOLUTE: 0.2 K/UL (ref 0–0.6)
EOSINOPHILS RELATIVE PERCENT: 1.4 %
GFR AFRICAN AMERICAN: 11
GFR NON-AFRICAN AMERICAN: 9
GLUCOSE BLD-MCNC: 104 MG/DL (ref 70–99)
GLUCOSE BLD-MCNC: 112 MG/DL (ref 70–99)
GLUCOSE BLD-MCNC: 130 MG/DL (ref 70–99)
HCT VFR BLD CALC: 35.6 % (ref 36–48)
HEMOGLOBIN: 11.2 G/DL (ref 12–16)
LYMPHOCYTES ABSOLUTE: 2.8 K/UL (ref 1–5.1)
LYMPHOCYTES RELATIVE PERCENT: 25.5 %
MCH RBC QN AUTO: 25.7 PG (ref 26–34)
MCHC RBC AUTO-ENTMCNC: 31.4 G/DL (ref 31–36)
MCV RBC AUTO: 81.9 FL (ref 80–100)
MONOCYTES ABSOLUTE: 1 K/UL (ref 0–1.3)
MONOCYTES RELATIVE PERCENT: 9.5 %
NEUTROPHILS ABSOLUTE: 6.9 K/UL (ref 1.7–7.7)
NEUTROPHILS RELATIVE PERCENT: 63 %
PDW BLD-RTO: 17.6 % (ref 12.4–15.4)
PERFORMED ON: ABNORMAL
PERFORMED ON: ABNORMAL
PLATELET # BLD: 189 K/UL (ref 135–450)
PMV BLD AUTO: 8.6 FL (ref 5–10.5)
POTASSIUM SERPL-SCNC: 5.6 MMOL/L (ref 3.5–5.1)
RBC # BLD: 4.34 M/UL (ref 4–5.2)
SODIUM BLD-SCNC: 132 MMOL/L (ref 136–145)
WBC # BLD: 10.9 K/UL (ref 4–11)

## 2021-12-29 PROCEDURE — 94761 N-INVAS EAR/PLS OXIMETRY MLT: CPT

## 2021-12-29 PROCEDURE — 94640 AIRWAY INHALATION TREATMENT: CPT

## 2021-12-29 PROCEDURE — 6370000000 HC RX 637 (ALT 250 FOR IP): Performed by: INTERNAL MEDICINE

## 2021-12-29 PROCEDURE — 2580000003 HC RX 258: Performed by: INTERNAL MEDICINE

## 2021-12-29 PROCEDURE — 90935 HEMODIALYSIS ONE EVALUATION: CPT

## 2021-12-29 PROCEDURE — 85025 COMPLETE CBC W/AUTO DIFF WBC: CPT

## 2021-12-29 PROCEDURE — G0378 HOSPITAL OBSERVATION PER HR: HCPCS

## 2021-12-29 PROCEDURE — 36415 COLL VENOUS BLD VENIPUNCTURE: CPT

## 2021-12-29 PROCEDURE — 80048 BASIC METABOLIC PNL TOTAL CA: CPT

## 2021-12-29 RX ADMIN — ALPRAZOLAM 0.75 MG: 0.5 TABLET ORAL at 05:01

## 2021-12-29 RX ADMIN — FUROSEMIDE 40 MG: 40 TABLET ORAL at 12:39

## 2021-12-29 RX ADMIN — BUPROPION HYDROCHLORIDE 150 MG: 150 TABLET, EXTENDED RELEASE ORAL at 12:39

## 2021-12-29 RX ADMIN — AMLODIPINE BESYLATE 10 MG: 5 TABLET ORAL at 12:40

## 2021-12-29 RX ADMIN — INSULIN GLARGINE 10 UNITS: 100 INJECTION, SOLUTION SUBCUTANEOUS at 07:47

## 2021-12-29 RX ADMIN — PROPRANOLOL HYDROCHLORIDE 80 MG: 80 CAPSULE, EXTENDED RELEASE ORAL at 12:46

## 2021-12-29 RX ADMIN — ALPRAZOLAM 0.75 MG: 0.5 TABLET ORAL at 12:46

## 2021-12-29 RX ADMIN — ASPIRIN 81 MG: 81 TABLET, COATED ORAL at 12:40

## 2021-12-29 RX ADMIN — Medication 10 ML: at 08:00

## 2021-12-29 RX ADMIN — TIOTROPIUM BROMIDE AND OLODATEROL 2 PUFF: 3.124; 2.736 SPRAY, METERED RESPIRATORY (INHALATION) at 12:48

## 2021-12-29 RX ADMIN — SPIRONOLACTONE 50 MG: 25 TABLET ORAL at 12:40

## 2021-12-29 RX ADMIN — LISINOPRIL 40 MG: 20 TABLET ORAL at 12:40

## 2021-12-29 ASSESSMENT — PAIN SCALES - GENERAL: PAINLEVEL_OUTOF10: 0

## 2021-12-29 NOTE — DISCHARGE SUMMARY
[facility] -- Physician Discharge Summary     Don Palacios  1975  MRN: [id number]    Admit Date: 12/27/2021  Discharge Date: No discharge date for patient encounter.     Attending MD: Garima Johnston MD  Discharging MD: Garima Johnston MD  PCP: Sona Mckinley [address] [facility] / [address] [facility] [phone]    Admission Diagnosis: Hyperkalemia [E87.5]  General weakness [R53.1]  ESRD (end stage renal disease) ([facility] [address] [N18.6]  ESRD on hemodialysis ([facility] [address] [N18.6, Z99.2]  Multiple falls [R29.6]  DISCHARGE DIAGNOSIS: same    Full Hospital Problem List:  Active Hospital Problems    Diagnosis Date Noted    ESRD (end stage renal disease) ([facility] [address] [N18.6] 10/04/2021    History of medication noncompliance [Z91.14]     Anemia [D64.9] 10/05/2013    Type 2 diabetes mellitus, with long-term current use of insulin (HCC) [E11.9, Z79.4]     Mixed hyperlipidemia [E78.2]            Hospital Course:  55 y.o. female with past medical history of CHF, previous CVA, chronic kidney disease on hemodialysis every Monday Wednesday Friday, hypertension, neuropathy, hyperlipidemia who presents to the ED with complaint of generalized weakness with multiple falls.  Patient states she has had daily falls since 15 December.  Patient states she does have some generalized weakness with multiple falls in the past secondary to her previous stroke.  Patient states she walks with a walker.  Patient states she has never fallen this much for this consistently. Sandra Loredo states today she was at dialysis and states she was putting her shoe on when she fell.  States she did hit her head.  Patient states she is had pain to her neck and low back.  Patient states she did not receive dialysis today.  She was sent to the ED for further evaluation and treatment of generalized weakness with multiple falls. Steven Carrasquillo states she does produce small amount of urine.  She denies any decreased urinary output or changes in urinary output.  She denies any changes in bowel movements.  Denies abdominal pain, nausea/vomiting, chest pain or shortness of breath.       She has had multiple admits this fall, medical chart reviewed  Unclear whether pt is able to take care of herself due to multiple falls  PT/OT sees pt, and her therapy scores recommend home d/c    She has urgent HD here 12/28 and regular HD 12/29  Cleared for d/c from renal standpoint  To continue regular outpt HD schedule    Consults made during Hospitalization:  IP CONSULT TO NEPHROLOGY  IP CONSULT TO INTERNAL MEDICINE  IP CONSULT TO NEPHROLOGY  PHARMACY TO DOSE MEDICATION    Treatment team at time of Discharge: Treatment Team: Attending Provider: Rustam Oliveira MD; Consulting Physician: Ed Lua MD; Consulting Physician: Rustam Oliveira MD; : Sherren Muck, RN; Utilization Reviewer: Aubrey Brumfield RN; Registered Nurse: Cait See RN    Imaging Results:  CT HEAD WO CONTRAST    Result Date: 12/27/2021  EXAMINATION: CT OF THE HEAD WITHOUT CONTRAST  12/27/2021 4:50 pm TECHNIQUE: CT of the head was performed without the administration of intravenous contrast. Dose modulation, iterative reconstruction, and/or weight based adjustment of the mA/kV was utilized to reduce the radiation dose to as low as reasonably achievable. COMPARISON: 08/27/2021 HISTORY: ORDERING SYSTEM PROVIDED HISTORY: falls TECHNOLOGIST PROVIDED HISTORY: Has a \"code stroke\" or \"stroke alert\" been called? ->No Reason for exam:->falls Decision Support Exception - unselect if not a suspected or confirmed emergency medical condition->Emergency Medical Condition (MA) Is the patient pregnant?->No Reason for Exam: Fall, Fall (fell at dialysis today, was putting her shoe on and fell, unsure if she hit her head, no LOC, denies pain anywhere). FINDINGS: BRAIN/VENTRICLES: There is no acute intracranial hemorrhage, mass effect or midline shift. No abnormal extra-axial fluid collection.   The gray-white differentiation is maintained without evidence of an acute infarct. There is no evidence of hydrocephalus. Patchy hypodensities in the periventricular and subcortical white matter, which are nonspecific, but may represent chronic small vessel ischemic change. ORBITS: The visualized portion of the orbits demonstrate no acute abnormality. SINUSES: The visualized paranasal sinuses and mastoid air cells demonstrate no acute abnormality. SOFT TISSUES/SKULL:  No acute abnormality of the visualized skull or soft tissues. No acute intracranial abnormality. CT CERVICAL SPINE WO CONTRAST    Result Date: 12/27/2021  EXAMINATION: CT OF THE CERVICAL SPINE WITHOUT CONTRAST 12/27/2021 4:49 pm TECHNIQUE: CT of the cervical spine was performed without the administration of intravenous contrast. Multiplanar reformatted images are provided for review. Dose modulation, iterative reconstruction, and/or weight based adjustment of the mA/kV was utilized to reduce the radiation dose to as low as reasonably achievable. COMPARISON: None. HISTORY: ORDERING SYSTEM PROVIDED HISTORY: fall TECHNOLOGIST PROVIDED HISTORY: Reason for exam:->fall Decision Support Exception - unselect if not a suspected or confirmed emergency medical condition->Emergency Medical Condition (MA) Is the patient pregnant?->No Reason for Exam: Fall, Fall (fell at dialysis today, was putting her shoe on and fell, unsure if she hit her head, no LOC, denies pain anywhere). FINDINGS: BONES/ALIGNMENT: There is mild disc space narrowing throughout. There is no acute fracture or traumatic malalignment. DEGENERATIVE CHANGES: No significant degenerative changes. SOFT TISSUES: There is no prevertebral soft tissue swelling. There is a 2.5 cm hypodensity left lobe of thyroid gland inferiorly. There is a right IJ catheter in place. The lung apices are clear. Mild degenerative disc space narrowing throughout with no acute abnormality seen.  2.5 cm hypodensity left lobe of the thyroid gland. Suggest ultrasound follow-up. RECOMMENDATIONS: Unavailable     CT THORACIC SPINE WO CONTRAST    Result Date: 12/27/2021  EXAMINATION: CT OF THE THORACIC SPINE WITHOUT CONTRAST  12/27/2021 4:50 pm: TECHNIQUE: CT of the thoracic spine was performed without the administration of intravenous contrast. Multiplanar reformatted images are provided for review. Dose modulation, iterative reconstruction, and/or weight based adjustment of the mA/kV was utilized to reduce the radiation dose to as low as reasonably achievable. COMPARISON: None. HISTORY: ORDERING SYSTEM PROVIDED HISTORY: fall TECHNOLOGIST PROVIDED HISTORY: Reason for exam:->fall Is the patient pregnant?->No Reason for Exam: Fall, Fall (fell at dialysis today, was putting her shoe on and fell, unsure if she hit her head, no LOC, denies pain anywhere). FINDINGS: BONES/ALIGNMENT: There is mild scoliotic curvature of the thoraco lumbar spine, with dextroconvexity. .  The vertebral body heights are maintained. No osseous destructive lesion is seen. DEGENERATIVE CHANGES: Anterolateral spurring is noted at multiple levels. There is no significant narrowing of the central canal. SOFT TISSUES: No paraspinal mass is seen. No acute fracture or subluxation of the thoracic spine. Multilevel spondylosis the RECOMMENDATIONS: Unavailable     CT LUMBAR SPINE WO CONTRAST    Result Date: 12/27/2021  EXAMINATION: CT OF THE LUMBAR SPINE WITHOUT CONTRAST  12/27/2021 TECHNIQUE: CT of the lumbar spine was performed without the administration of intravenous contrast. Multiplanar reformatted images are provided for review. Adjustment of mA and/or kV according to patient size was utilized. Dose modulation, iterative reconstruction, and/or weight based adjustment of the mA/kV was utilized to reduce the radiation dose to as low as reasonably achievable.  COMPARISON: None HISTORY: ORDERING SYSTEM PROVIDED HISTORY: fall TECHNOLOGIST PROVIDED HISTORY: Reason for exam:->fall Decision Support Exception - unselect if not a suspected or confirmed emergency medical condition->Emergency Medical Condition (MA) Is the patient pregnant?->No Reason for Exam: Fall, Fall (fell at dialysis today, was putting her shoe on and fell, unsure if she hit her head, no LOC, denies pain anywhere). FINDINGS: BONES/ALIGNMENT: There is normal alignment of the spine. The vertebral body heights are maintained. No osseous destructive lesion is seen. DEGENERATIVE CHANGES: Mild facet arthropathy. Anterior marginal endplate spurs most pronounced at L1-L2. Otherwise minimal disc space disease identified. SOFT TISSUES/RETROPERITONEUM: No paraspinal mass is seen. There is a borderline retroperitoneal lymph nodes up to 9 mm short axis dimension. No evidence acute fracture traumatic malalignment of the lumbar spine. Borderline/upper limits normal lymph nodes within the retroperitoneum 9 mm short axis dimension. Please correlate with history and laboratory testing. RECOMMENDATIONS: Unavailable     XR CHEST PORTABLE    Result Date: 12/27/2021  EXAMINATION: ONE X-RAY VIEW OF THE CHEST 12/27/2021 3:41 pm COMPARISON: November 2, 2021 HISTORY: ORDERING SYSTEM PROVIDED HISTORY:  Falls TECHNOLOGIST PROVIDED HISTORY: Reason for Exam:  Falls Reason for Exam:  Falls FINDINGS: The cardiomediastinal silhouette is enlarged but stable. Right-sided central venous catheter extends to the right atrium. Lung volumes are low. No evidence of acute pulmonary edema or acute infiltrate. No pleural effusion or pneumothorax. Stable cardiomegaly. No acute cardiopulmonary process.          Discharge Exam: (2-7 system for EPF/Detailed, ?8 for Comprehensive)  /75   Pulse 97   Temp 97.9 °F (36.6 °C) (Oral)   Resp 18   Ht 5' 7\" (1.702 m)   Wt 187 lb 14.4 oz (85.2 kg)   SpO2 97%   BMI 29.43 kg/m²   Constitutional: vitals as above: alert, appears stated age and cooperative    Psychiatric: normal insight and judgment, oriented to person, place, time, and general circumstances    Head: Normocephalic, without obvious abnormality, atraumatic    Eyes:lids and lashes normal, conjunctivae and sclerae normal and pupils equal, round, reactive to light and accomodation    EMNT: external ears normal, nares midline    Neck: no carotid bruit, supple, symmetrical, trachea midline and thyroid not enlarged, symmetric, no tenderness/mass/nodules     Respiratory: clear to auscultation and percussion bilaterally with normal respiratory effort    Cardiovascular: normal rate, regular rhythm, normal S1 and S2 and no murmurs    Gastrointestinal: soft, non-tender, non-distended, normal bowel sounds, no masses or organomegaly    Extremities: no clubbing, trace edema  Skin:No rashes or nodules noted. Neurologic:negative             Disposition: home    Condition: stable    Discharge Medications:  [unfilled]       Medication List      CONTINUE taking these medications     * albuterol sulfate  (90 Base) MCG/ACT inhaler; Commonly known   as: Proventil HFA; Inhale 2 puffs into the lungs every 4 hours as needed   for Wheezing   * albuterol sulfate  (90 Base) MCG/ACT inhaler; Inhale 2 puffs   into the lungs every 6 hours as needed for Wheezing or Shortness of Breath   * albuterol 2.5 MG/0.5ML Nebu nebulizer solution; Commonly known as:   PROVENTIL; Take 0.5 mLs by nebulization every 6 hours as needed for   Wheezing or Shortness of Breath   ALPRAZolam 3 MG extended release tablet; Commonly known as: XANAX XR; Take 1 tablet by mouth every morning for 30 days.  amLODIPine 10 MG tablet; Commonly known as: NORVASC   aspirin 81 MG EC tablet; Take 1 tablet by mouth daily   atorvastatin 40 MG tablet; Commonly known as: LIPITOR; Take 1 tablet by   mouth nightly   benzonatate 200 MG capsule; Commonly known as: TESSALON;  Take 1 capsule   by mouth every 8 hours as needed for Cough   Breathe Right Extra Strength Strp; 1 strip by Does not apply route   nightly as needed (nasal congestion)   buPROPion 150 MG extended release tablet; Commonly known as: WELLBUTRIN   XL; Take 1 tablet by mouth every morning   cloNIDine 0.2 MG/24HR Ptwk; Commonly known as: CATAPRES; Place 1 patch   onto the skin once a week   Dulaglutide 1.5 MG/0.5ML Sopn; Inject 1.5 mg into the skin once a week   fluvoxaMINE 100 MG tablet; Commonly known as: LUVOX; Take 1 tablet by   mouth nightly   furosemide 40 MG tablet; Commonly known as: LASIX   glucose 40 % Gel; Commonly known as: GLUTOSE; Take 37.5 mLs by mouth as   needed (low blood sugar)   glycopyrrolate-formoterol 9-4.8 MCG/ACT Aero; Commonly known as: Bevespi   Aerosphere; Inhale 2 puffs into the lungs 2 times daily   hydrOXYzine 25 MG tablet; Commonly known as: ATARAX   insulin lispro (1 Unit Dial) 100 UNIT/ML Sopn; Inject 0-6 Units into the   skin 3 times daily (with meals) **Corrective Low Dose Algorithm**   Glucose: Dose:              No Insulin 140-199 1 Unit 200-249 2   Units 250-299 3 Units 300-349 4 Units 350-399 5 Units Over 399 6 Units   Insulin Pen Needle 29G X 12.7MM Misc; 1 each by Does not apply route   daily   Lantus SoloStar 100 UNIT/ML injection pen; Generic drug: insulin   glargine   lisinopril 40 MG tablet; Commonly known as: PRINIVIL;ZESTRIL   methyl salicylate-menthol 66-12 % Crea; Apply topically 3 times daily as   needed for Pain   pregabalin 75 MG capsule; Commonly known as: Stacy Bryan; Take 1 capsule by   mouth nightly for 30 doses.    propranolol 80 MG extended release capsule; Commonly known as: INDERAL   LA; TAKE ONE CAPSULE BY MOUTH EVERY MORNING   Pulse Oximeter Misc; 1 each by Does not apply route every 6-8 hours as   needed (shortness of breath)   spironolactone 50 MG tablet; Commonly known as: ALDACTONE; TAKE ONE   TABLET BY MOUTH DAILY   ThermaCare Back/Hip Misc; 1 each by Does not apply route daily as needed   (back pain)  * This list has 3 medication(s) that are the same as other medications   prescribed for you. Read the directions carefully, and ask your doctor or   other care provider to review them with you. Allergies: Allergies   Allergen Reactions    Amoxicillin Hives, Itching and Other (See Comments)     Tolerates cephalosporins  Patient tolerating cefazolin (ANCEF) as of October 11, 2018      Levofloxacin Anaphylaxis    Vancomycin Anaphylaxis, Hives and Shortness Of Breath    Tape [Adhesive Tape] Other (See Comments)     Paper tape turns skin bright red. Plastic tape okay. Follow up Instructions: Follow-up with PCP: Daniela Dunbar in 2 wk .       Total time spent on day of discharge including face-to-face visit, examination, documentation, counseling, preparation of discharge plans and followup, and discharge medicine reconciliation and presciptions is 33 minutes    (Please note that portions of this note were completed with a voice recognition program.  Efforts were made to edit the dictations but occasionally words are mis-transcribed.)      Signed:  Aravind Garcia MD  12/29/2021

## 2021-12-29 NOTE — PROGRESS NOTES
Sharmon Lamas, MD Lorrine Lesch, MD Aron Redbird, MD                                  Office: [phone]                 Fax: [phone]          [facility]                    NEPHROLOGY  HEMO-DIALYSIS PROGRESS NOTE:     PATIENT NAME: Whit Cruz  : 1975  MRN: [id number]        Indication for Dialysis  ESRD  Patient seen on dialysis treatment. Tolerating treatment  Fairly well      Stable from renal after dialysis treatment. Volume status stable. No further hypotension. Vitals:    21 0358   BP: 120/75   Pulse: 97   Resp: 18   Temp: 97.9 °F (36.6 °C)   SpO2: 97%       Neck. JVD visible  Cardiac No pericardial rub. Flow murmur. Chest: Bilateral Rales. No rhonchi  Ext :  Edema mild    Labs Reviewed  by me   Labs   Lab Results   Component Value Date    CREATININE 5.2 (HH) 2021    BUN 32 (H) 2021     (L) 2021    K 5.6 (H) 2021    CL 93 (L) 2021    CO2 25 2021     Lab Results   Component Value Date    WBC 10.9 2021    HGB 11.2 (L) 2021    HCT 35.6 (L) 2021    MCV 81.9 2021     2021       Dialysis Treatment and Prescription reviewed          RX:  See dialysis flowsheet for specifics on access, blood flow rate, dialysate baths, duration of dialysis, anticoagulation and other technical information. COMMENTS:  stable on dialysis. Continue to Target dry weight and clearance.     Monitor closely for any hypotension    Dialysis treatment plan and dialysis orders discussed with dialysis RN @ bedside  Stable from renal for discharge

## 2021-12-29 NOTE — PROGRESS NOTES
Discharge order in place.  to transport patient home.  Plan to follow up with PCP and do out patient PT/OT

## 2021-12-29 NOTE — CARE COORDINATION
Patient discharged on 12/29/21 to home ,to be going to outpatient therapy as per patient's preference. She declined SNF or HHC. Outpatient HD; [facility]   [address] [place] [address] [phone]     [facility] at [phone], pm    Kenzie Maki at [facility] was informed of patient's discharge to home.     All discharge needs met per case management

## 2021-12-29 NOTE — PROGRESS NOTES
Treatment time: 2.5 hrs    Net UF: 2400 ml    Pre weight: 85.3 kg  Post weight: 82.8 kg  EDW: 85 kg    Access used: R CVC  Access function: Good    Medications or blood products given: N/A    Regular outpatient schedule: [facility] MWF    Summary of response to treatment: Patient tolerated tx well. Access ran well. Report given to primary RN. Patient returned to room. VSS    Copy of dialysis treatment record placed in chart, to be scanned into EMR.

## 2021-12-29 NOTE — PLAN OF CARE
Problem: Falls - Risk of:  Goal: Will remain free from falls  Description: Will remain free from falls  12/28/2021 2127 by Deisi De La Torre RN  Outcome: Ongoing  12/28/2021 1208 by Janell Madrigal RN  Outcome: Ongoing  Goal: Absence of physical injury  Description: Absence of physical injury  12/28/2021 2127 by Deisi De La Torre RN  Outcome: Ongoing  12/28/2021 1208 by Janell Madrigal RN  Outcome: Ongoing

## 2021-12-29 NOTE — CARE COORDINATION
Discharge Planning Assessment  RN  discharge planner met with patient and family member to discuss reason for admission, current living situation, and potential needs at the time of discharge    Demographics/Insurance verified Yes    Current type of dwelling:  condo    Patient from ECF/ confirmed with:  N/A    Living arrangements: lives with spouse    Level of function/Support:  Independent with ADLs  Spouse supports as needed    PCP:  Dr Shagufta Hurtado    Last Visit to PCP:  12/15/21    DME:  Shawn Flores    Active with any community resources/agencies/skilled home care:   Has had outpatient therapy before. [employer] [address]  [address] [phone]     [facility] at [phone],     Medication compliance issues:  Denies    Financial issues that could impact healthcare:  No      Tentative discharge plan: SNF vs home with hhc     Discussed and provided facilities of choice if transition to a skilled nursing facility is required at the time of discharge       Discussed with patient and/or family that on the day of discharge home tentative time of discharge will be between 10 AM and noon.     Transportation at the time of discharge: spouse

## 2021-12-29 NOTE — CARE COORDINATION
Therapy recommending SNF patient stated would prefer going home and  to be going for outpatient therapy as what she has done before. MD was updated. ,  Spouse  was also updated, otherwise he  had chosen [facility] because she has been there before. is aware .     Spouse transports patient for outpatient HD  [facility]   [address]  [address] [phone]    [facility] at [phone],

## 2021-12-29 NOTE — DISCHARGE INSTR - COC
Continuity of Care Form    Patient Name: Olinda Rosas   :  1975  MRN:  [id number]    Admit date:  2021  Discharge date: 2021    Code Status Order: Full Code   Advance Directives:      Admitting Physician:  Amadou Fleming MD  PCP: Marcell Starkey    Discharging Nurse: Malachi Chavez.    [address] Unit/Room#: [id number]-[id number]/3313-01  Discharging Unit Phone Number: [phone]    Emergency Contact:   Extended Emergency Contact Information  Primary Emergency Contact: [address] Phone: [phone]  Relation: Spouse    Past Surgical History:  Past Surgical History:   Procedure Laterality Date    CERVIX SURGERY      laser tx for dysplasia;    EYE SURGERY      FOOT SURGERY Right     FOOT SURGERY Bilateral     FOOT SURGERY Left     IR TUNNELED CATHETER PLACEMENT GREATER THAN 5 YEARS  2021    IR TUNNELED CATHETER PLACEMENT GREATER THAN 5 YEARS 2021 Alicia Marroquin MD [facility] SPECIAL PROCEDURES       Immunization History:   Immunization History   Administered Date(s) Administered    Influenza 2011    Influenza Virus Vaccine 2011, 10/05/2013    Pneumococcal Polysaccharide ([id number]) 2021    Tdap (Boostrix, Adacel) 2018, 2021       Active Problems:  Patient Active Problem List   Diagnosis Code    Type 2 diabetes mellitus, with long-term current use of insulin ([facility]) E11.9, Z79.4    Mixed hyperlipidemia E78.2    Migraine headache G43.909    Anemia D64.9    Diabetic foot infection ([facility] [address] E11.628, L08.9    Pyogenic inflammation of bone ([facility]) M86.9    History of medication noncompliance Z91.14    Osteomyelitis of left foot ([facility]) M86.9    Nephrotic syndrome N04.9    Peripheral edema R60.9    Pulmonary edema J81.1    Right sided numbness R20.0    Tobacco dependence F17.200    Chronic kidney disease (CKD), stage III (moderate) ([facility]) N18.30    Chronic diastolic (congestive) heart failure ([facility]) I50.32    History of CVA (cerebrovascular accident) Z76.50 Arterial ischemic stroke, ICA, left, acute ([facility] [address] I63.232    HTN (hypertension), benign I10    DM (diabetes mellitus), secondary, uncontrolled, w/neurologic complic ([facility]) W21.73, H49.33    Dyslipidemia E78.5    Smoker F17.200    Panic disorder F41.0    Isolated proteinuria R80.0    Acute on chronic diastolic heart failure ([facility]) I50.33    Diabetic peripheral neuropathy ([facility]) E11.42    Acute respiratory failure ([facility]) J96.00    Depression F32. 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, associated with type 2 diabetes mellitus ([facility]) T96.9903    Cellulitis of right foot L03.115    Hidradenitis suppurativa L73.2    Hypocalcemia E83.51    Non-toxic multinodular goiter E04.2    Polyneuropathy due to type 2 diabetes mellitus ([facility] [address] E11.42    Proliferative diabetic retinopathy associated with type 2 diabetes mellitus ([facility] [address] E11.3599    ESRD (end stage renal disease) ([facility] [address] N18.6    Acute respiratory failure with hypoxemia ([facility]) J96.01    Acute respiratory failure due to COVID-19 ([facility] [address] U07.1, J96.00    Suspected COVID-19 virus infection Z20.822    Epiglottitis J05.10       Isolation/Infection:   Isolation            No Isolation          Patient Infection Status       Infection Onset Added Last Indicated Last Indicated By Review Planned Expiration Resolved Resolved By    None active    Resolved    COVID-19 (Rule Out) 10/16/21 10/16/21 10/17/21 COVID-19 (Ordered)   10/17/21 Rule-Out Test Resulted    COVID-19 (Rule Out) 10/04/21 10/04/21 10/04/21 COVID-19 (Ordered)   10/05/21 Rule-Out Test Resulted    COVID-19 (Rule Out) 08/27/21 08/27/21 08/27/21 COVID-19 (Ordered)   08/28/21 Rule-Out Test Resulted    COVID-19 (Rule Out) 02/23/21 02/23/21 02/23/21 COVID-19, Rapid (Ordered)   02/23/21 Rule-Out Test Resulted    MRSA 04/22/19 04/25/19 04/22/19 Wound Culture   08/29/20 Deon Ma, RN            Nurse Assessment:  Last Vital Signs: /75 12/29/21 at 1:01 PM EST    CASE MANAGEMENT/SOCIAL WORK SECTION    Inpatient Status Date: ***    Readmission Risk Assessment Score:  Readmission Risk              Risk of Unplanned Readmission:  48           Discharging to Facility/ Agency   Name:   Address:  Phone:  Fax:    Dialysis Facility (if applicable)   Name:  Address:  Dialysis Schedule:  Phone:  Fax:    / signature: {Esignature:[id number]}    PHYSICIAN SECTION    Prognosis: Guarded    Condition at Discharge: Stable    Rehab Potential (if transferring to Rehab): Good    Recommended Labs or Other Treatments After Discharge: PT/OT    Physician Certification: I certify the above information and transfer of Beba Hernandez  is necessary for the continuing treatment of the diagnosis listed and that she requires Home Care for greater 30 days.      Update Admission H&P: No change in H&P    PHYSICIAN SIGNATURE:  Electronically signed by Samy Wiggins MD on 12/29/21 at 8:25 AM EST

## 2022-01-01 DIAGNOSIS — F41.0 GENERALIZED ANXIETY DISORDER WITH PANIC ATTACKS: ICD-10-CM

## 2022-01-01 DIAGNOSIS — F41.1 GENERALIZED ANXIETY DISORDER WITH PANIC ATTACKS: ICD-10-CM

## 2022-01-01 DIAGNOSIS — E11.49 OTHER DIABETIC NEUROLOGICAL COMPLICATION ASSOCIATED WITH TYPE 2 DIABETES MELLITUS (HCC): ICD-10-CM

## 2022-01-03 RX ORDER — ALPRAZOLAM 3 MG/1
TABLET, EXTENDED RELEASE ORAL
Qty: 30 TABLET | Refills: 0 | Status: ON HOLD | OUTPATIENT
Start: 2022-01-03 | End: 2022-01-05 | Stop reason: SDUPTHER

## 2022-01-03 RX ORDER — PREGABALIN 75 MG/1
CAPSULE ORAL
Qty: 30 CAPSULE | Refills: 0 | Status: SHIPPED | OUTPATIENT
Start: 2022-01-03 | End: 2022-03-17 | Stop reason: SDUPTHER

## 2022-01-04 ENCOUNTER — APPOINTMENT (OUTPATIENT)
Dept: GENERAL RADIOLOGY | Age: 47
DRG: 194 | End: 2022-01-04
Payer: COMMERCIAL

## 2022-01-04 ENCOUNTER — APPOINTMENT (OUTPATIENT)
Dept: CT IMAGING | Age: 47
DRG: 194 | End: 2022-01-04
Payer: COMMERCIAL

## 2022-01-04 ENCOUNTER — HOSPITAL ENCOUNTER (INPATIENT)
Age: 47
LOS: 2 days | Discharge: NURSING, REHAB OR LONG-TERM CARE | DRG: 194 | End: 2022-01-06
Attending: EMERGENCY MEDICINE | Admitting: INTERNAL MEDICINE
Payer: COMMERCIAL

## 2022-01-04 DIAGNOSIS — F41.1 GENERALIZED ANXIETY DISORDER WITH PANIC ATTACKS: ICD-10-CM

## 2022-01-04 DIAGNOSIS — Z99.2 ESRD ON HEMODIALYSIS (HCC): ICD-10-CM

## 2022-01-04 DIAGNOSIS — R29.6 FREQUENT FALLS: Primary | ICD-10-CM

## 2022-01-04 DIAGNOSIS — N18.6 ESRD ON HEMODIALYSIS (HCC): ICD-10-CM

## 2022-01-04 DIAGNOSIS — R53.81 DEBILITY: ICD-10-CM

## 2022-01-04 DIAGNOSIS — R53.1 GENERAL WEAKNESS: ICD-10-CM

## 2022-01-04 DIAGNOSIS — F41.0 GENERALIZED ANXIETY DISORDER WITH PANIC ATTACKS: ICD-10-CM

## 2022-01-04 LAB
ALBUMIN SERPL-MCNC: 3.6 G/DL (ref 3.4–5)
ALP BLD-CCNC: 173 U/L (ref 40–129)
ALT SERPL-CCNC: 16 U/L (ref 10–40)
ANION GAP SERPL CALCULATED.3IONS-SCNC: 19 MMOL/L (ref 3–16)
AST SERPL-CCNC: 28 U/L (ref 15–37)
BASOPHILS ABSOLUTE: 0.1 K/UL (ref 0–0.2)
BASOPHILS RELATIVE PERCENT: 0.4 %
BETA-HYDROXYBUTYRATE: 0.2 MMOL/L (ref 0–0.27)
BILIRUB SERPL-MCNC: 0.4 MG/DL (ref 0–1)
BILIRUBIN DIRECT: <0.2 MG/DL (ref 0–0.3)
BILIRUBIN URINE: NEGATIVE
BILIRUBIN, INDIRECT: ABNORMAL MG/DL (ref 0–1)
BLOOD, URINE: ABNORMAL
BUN BLDV-MCNC: 58 MG/DL (ref 7–20)
CALCIUM SERPL-MCNC: 8.7 MG/DL (ref 8.3–10.6)
CHLORIDE BLD-SCNC: 94 MMOL/L (ref 99–110)
CLARITY: CLEAR
CO2: 19 MMOL/L (ref 21–32)
COLOR: YELLOW
CREAT SERPL-MCNC: 8.4 MG/DL (ref 0.6–1.1)
EKG ATRIAL RATE: 83 BPM
EKG DIAGNOSIS: NORMAL
EKG P AXIS: 23 DEGREES
EKG P-R INTERVAL: 150 MS
EKG Q-T INTERVAL: 400 MS
EKG QRS DURATION: 84 MS
EKG QTC CALCULATION (BAZETT): 470 MS
EKG R AXIS: 5 DEGREES
EKG T AXIS: 56 DEGREES
EKG VENTRICULAR RATE: 83 BPM
EOSINOPHILS ABSOLUTE: 0.1 K/UL (ref 0–0.6)
EOSINOPHILS RELATIVE PERCENT: 0.7 %
EPITHELIAL CELLS, UA: 1 /HPF (ref 0–5)
FOLATE: 13.74 NG/ML (ref 4.78–24.2)
GFR AFRICAN AMERICAN: 6
GFR NON-AFRICAN AMERICAN: 5
GLUCOSE BLD-MCNC: 165 MG/DL (ref 70–99)
GLUCOSE URINE: 250 MG/DL
HCG QUALITATIVE: NEGATIVE
HCT VFR BLD CALC: 36.5 % (ref 36–48)
HEMOGLOBIN: 11.6 G/DL (ref 12–16)
HYALINE CASTS: 5 /LPF (ref 0–8)
INR BLD: 1.08 (ref 0.88–1.12)
IRON SATURATION: 12 % (ref 15–50)
IRON: 36 UG/DL (ref 37–145)
KETONES, URINE: NEGATIVE MG/DL
LACTIC ACID: 1 MMOL/L (ref 0.4–2)
LEUKOCYTE ESTERASE, URINE: NEGATIVE
LYMPHOCYTES ABSOLUTE: 1.4 K/UL (ref 1–5.1)
LYMPHOCYTES RELATIVE PERCENT: 10.2 %
MCH RBC QN AUTO: 26.2 PG (ref 26–34)
MCHC RBC AUTO-ENTMCNC: 31.7 G/DL (ref 31–36)
MCV RBC AUTO: 82.6 FL (ref 80–100)
MICROSCOPIC EXAMINATION: YES
MONOCYTES ABSOLUTE: 1.2 K/UL (ref 0–1.3)
MONOCYTES RELATIVE PERCENT: 9.2 %
NEUTROPHILS ABSOLUTE: 10.8 K/UL (ref 1.7–7.7)
NEUTROPHILS RELATIVE PERCENT: 79.5 %
NITRITE, URINE: NEGATIVE
PDW BLD-RTO: 17 % (ref 12.4–15.4)
PH UA: 7 (ref 5–8)
PLATELET # BLD: 204 K/UL (ref 135–450)
PMV BLD AUTO: 8.4 FL (ref 5–10.5)
POTASSIUM REFLEX MAGNESIUM: 4.3 MMOL/L (ref 3.5–5.1)
PRO-BNP: ABNORMAL PG/ML (ref 0–124)
PROTEIN UA: >300 MG/DL
PROTHROMBIN TIME: 12.2 SEC (ref 9.9–12.7)
RBC # BLD: 4.42 M/UL (ref 4–5.2)
RBC UA: 5 /HPF (ref 0–4)
SODIUM BLD-SCNC: 132 MMOL/L (ref 136–145)
SPECIFIC GRAVITY UA: 1.01 (ref 1–1.03)
TOTAL IRON BINDING CAPACITY: 299 UG/DL (ref 260–445)
TOTAL PROTEIN: 7.4 G/DL (ref 6.4–8.2)
TROPONIN: 0.27 NG/ML
URINE REFLEX TO CULTURE: ABNORMAL
URINE TYPE: ABNORMAL
UROBILINOGEN, URINE: 0.2 E.U./DL
VITAMIN B-12: 485 PG/ML (ref 211–911)
WBC # BLD: 13.6 K/UL (ref 4–11)
WBC UA: 1 /HPF (ref 0–5)

## 2022-01-04 PROCEDURE — 82746 ASSAY OF FOLIC ACID SERUM: CPT

## 2022-01-04 PROCEDURE — 73502 X-RAY EXAM HIP UNI 2-3 VIEWS: CPT

## 2022-01-04 PROCEDURE — 99283 EMERGENCY DEPT VISIT LOW MDM: CPT

## 2022-01-04 PROCEDURE — 71045 X-RAY EXAM CHEST 1 VIEW: CPT

## 2022-01-04 PROCEDURE — 83880 ASSAY OF NATRIURETIC PEPTIDE: CPT

## 2022-01-04 PROCEDURE — 84484 ASSAY OF TROPONIN QUANT: CPT

## 2022-01-04 PROCEDURE — 83605 ASSAY OF LACTIC ACID: CPT

## 2022-01-04 PROCEDURE — 93010 ELECTROCARDIOGRAM REPORT: CPT | Performed by: INTERNAL MEDICINE

## 2022-01-04 PROCEDURE — 82010 KETONE BODYS QUAN: CPT

## 2022-01-04 PROCEDURE — 83540 ASSAY OF IRON: CPT

## 2022-01-04 PROCEDURE — 1200000000 HC SEMI PRIVATE

## 2022-01-04 PROCEDURE — 70450 CT HEAD/BRAIN W/O DYE: CPT

## 2022-01-04 PROCEDURE — 80076 HEPATIC FUNCTION PANEL: CPT

## 2022-01-04 PROCEDURE — 84703 CHORIONIC GONADOTROPIN ASSAY: CPT

## 2022-01-04 PROCEDURE — 81001 URINALYSIS AUTO W/SCOPE: CPT

## 2022-01-04 PROCEDURE — 83550 IRON BINDING TEST: CPT

## 2022-01-04 PROCEDURE — 82607 VITAMIN B-12: CPT

## 2022-01-04 PROCEDURE — 87040 BLOOD CULTURE FOR BACTERIA: CPT

## 2022-01-04 PROCEDURE — 80048 BASIC METABOLIC PNL TOTAL CA: CPT

## 2022-01-04 PROCEDURE — 83036 HEMOGLOBIN GLYCOSYLATED A1C: CPT

## 2022-01-04 PROCEDURE — 85025 COMPLETE CBC W/AUTO DIFF WBC: CPT

## 2022-01-04 PROCEDURE — 02HV33Z INSERTION OF INFUSION DEVICE INTO SUPERIOR VENA CAVA, PERCUTANEOUS APPROACH: ICD-10-PCS | Performed by: INTERNAL MEDICINE

## 2022-01-04 PROCEDURE — 93005 ELECTROCARDIOGRAM TRACING: CPT | Performed by: PHYSICIAN ASSISTANT

## 2022-01-04 PROCEDURE — 85610 PROTHROMBIN TIME: CPT

## 2022-01-04 ASSESSMENT — PAIN SCALES - GENERAL
PAINLEVEL_OUTOF10: 5
PAINLEVEL_OUTOF10: 7

## 2022-01-04 NOTE — CONSULTS
MD Julia Goldstein MD Ninette Mac, MD                Office: [phone]                      Fax: [phone]          NEPHROLOGY INITIAL CONSULT NOTE:     PATIENT NAME: Ariana Coats  : 1975  MRN: [id number]  REASON FOR CONSULT: I am asked to see this patient in consultation for my opinion regarding management of ESRD. My recommendations will be communicated by way of shared medical record. IMPRESSION / RECOMMENDATION:      Admitted for:  Debility [R53.81]  General weakness [R53.1]  ESRD (end stage renal disease) ([facility] [N18.6]  ESRD on hemodialysis ([facility] [N18.6, Z99.2]  Frequent falls [R29.6]      1. ESRD on iHD: MWF , follows w/ Dr. Abdelrahman Escobar  - outpt HD center: [facility], Dr Abdelrahman Escobar   - recently started HD in 2021, during admission w/ ATN w/ KINZA on CKD-4, was lost for f/u,), had acute hypoxic respiratory failure, pneumonia - needing intubation    - missed HD x2  - encouraged compliance as if her solutes are better control, she may feel overall better    - so HD today      2. Hypertension/Volume Status:  - BP HTN - hx of resistant HTN - slightly uncontrolled - f/u after HD + resume home meds   - EDW reported 88  -> 83 kg currently - so might be her new DW  - Na - chronic hypoNatremia - f/u w/ HD      3. Electrolytes/acid-base:  K: WNL  High AG metabolic acidosis: mild - f/u w/ HD     4. Anemia of renal failure: at goal  - RIA: w/ HD    5. BMD:  - Phos: follow iPTH outpt    D/W Pt, Dr Owen Bring       : Other supportive care :   - Check daily renal function panel with electrolytes-phosphorus  - Strict monitoring of I/Os, daily weight  - Renal feeds/diet  - Current medications reviewed. - Nephrotoxic medications have been discontinued. - Dose adjusted and appropriate.       - Dose meds for eGFR <15 mL/min/1.73m2.    - Avoid heavy opioids due to renal failure - may use very low dose dilaudid / fentanyl with close monitoring of CNS and respiratory depression. Other Problems:  Hospital Problems           Last Modified POA    * (Principal) ESRD (end stage renal disease) ([facility] [address] 1/4/2022 Yes    Mixed hyperlipidemia 1/4/2022 Yes    Overview Signed 8/8/2011  1:19 PM by Evans Simpson MD     mixed         Anemia (Chronic) 1/4/2022 Yes    HTN (hypertension), benign 1/4/2022 Yes    DM (diabetes mellitus), secondary, uncontrolled, w/neurologic complic ([facility] [address] 1/2/4736 Yes    Polyneuropathy due to type 2 diabetes mellitus ([facility] 1/4/2022 Yes          Please refer to orders. Multiple complex problems. High risk  Discussed with patient, and treatment team    Thank you for allowing me to participate in this patient's care. Please do not hesitate to contact me with any questions/concerns. We will follow along with you. Quinn Adams MD  [facility] [address] [place] [address] [place]: [phone] or Via [employer]ove  Fax: [phone]    Time spent  ~ 35 minutes that included face-to-face meeting/discussion with patient, and treatment team (including primary/referring team and other consultants; included coordination of care with the treatment team; and review of patient's electronic medical records and ordering appropriates tests.       ===========================================  ===========================================      CHIEF COMPLAINT:   Chief Complaint   Patient presents with    Fall     Pt reports frequent falls      History Obtained From:  patient + treatment team + Electronic Medical Records.      HPI: Ms. Shanita Long is a 55 y.o. female with significant past medical history of End stage renal disease, and   Past Medical History:   Diagnosis Date    Blind in both eyes     Cerebral artery occlusion with cerebral infarction ([facility]     CHF (congestive heart failure) (HCC)     Chronic kidney disease     Depression     Diabetes mellitus out of control ([facility]     Diabetes mellitus, type II ([facility]     2005    Diabetic neuropathy associated with type 2 diabetes mellitus ([facility]     Generalized headaches     Hypertension     Infertility     Insomnia     chronic vs lack of time spent to sleep    Migraine headache 11/09/2011    Mixed hyperlipidemia     Otitis media     h/o recurrent    Pelvic abscess in female 10/05/2013    Pneumonia     2004 approx.  Stroke ([facility] 08/27/2020    [facility]    ,   presents with Fall (Pt reports frequent falls )  Admitted with Debility [R53.81]  General weakness [R53.1]  ESRD (end stage renal disease) ([facility] [address] [address] [N18.6]  ESRD on hemodialysis ([facility] [N18.6, Z99.2]  Frequent falls [R29.6]  We are called for ESRD care. Has been having many falls at home, saying feeling overall weaker,   W/ hx of CVA, w/ residual Rt weakness,   Does iHD MWF, said last HD last Wednesday, missed Friday and this Monday,     No current active complaints. Patient denied chest pain / dizziness/lightheadedness/syncope/ SOB  + chronic mild leg edema. Re: ESRD  · Duration (when): Chronic   · Location (where): kidneys  · Severity (ex: CKD stage): End stage  · Timing (ex: continuous, intermittent): intermittent HD  · Context (ex: related to condition):   DM / HTN related.   · Modifying factors (ex: medications, interventions): dialysis support  · Associated signs & symptoms (ex: edema, SOB): Refer to HPI and Chief complaint    Past medical, surgical, social and family medial history reviewed by me:   PAST MEDICAL HISTORY:   Past Medical History:   Diagnosis Date    Blind in both eyes     Cerebral artery occlusion with cerebral infarction ([facility]     CHF (congestive heart failure) ([facility]     Chronic kidney disease     Depression     Diabetes mellitus out of control ([facility]     Diabetes mellitus, type II ([facility]     2005    Diabetic neuropathy associated with type 2 diabetes mellitus ([facility]     Generalized headaches     Hypertension     Infertility     Insomnia     chronic vs lack of time spent to sleep    Migraine headache 11/09/2011    Mixed hyperlipidemia     Otitis media     h/o recurrent    Pelvic abscess in female 10/05/2013    Pneumonia     2004 approx.     Stroke ([facility] 08/27/2020    Stroke ([facility] 08/27/2021     PAST SURGICAL HISTORY:   Past Surgical History:   Procedure Laterality Date    CERVIX SURGERY      laser tx for dysplasia;1992    EYE SURGERY      FOOT SURGERY Right     FOOT SURGERY Bilateral     FOOT SURGERY Left     IR TUNNELED CATHETER PLACEMENT GREATER THAN 5 YEARS  9/7/2021    IR TUNNELED CATHETER PLACEMENT GREATER THAN 5 YEARS 9/7/2021 Matilde Lua MD [facility] SPECIAL PROCEDURES     FAMILY HISTORY:   Family History   Problem Relation Age of Onset    Diabetes Mother     Other Mother 79        Covid    Diabetes Father     High Blood Pressure Father     Colon Cancer Father     Diabetes Sister     Alcohol Abuse Maternal Grandfather     Diabetes Paternal Grandmother     Alcohol Abuse Paternal Grandfather     Diabetes Paternal Aunt     Diabetes Paternal Uncle      SOCIAL HISTORY:   Social History     Socioeconomic History    Marital status:      Spouse name: James Lama Number of children: 0    Years of education: None    Highest education level: None   Occupational History    Occupation: works as    Tobacco Use    Smoking status: Former Smoker     Packs/day: 1.00     Types: Cigarettes    Smokeless tobacco: Never Used    Tobacco comment: Quit in August 2021   Vaping Use    Vaping Use: Never used   Substance and Sexual Activity    Alcohol use: No     Comment: Very Rare    Drug use: No    Sexual activity: Yes     Partners: Male   Other Topics Concern    None   Social History Narrative    None     Social Determinants of Health     Financial Resource Strain:     Difficulty of Paying Living Expenses: Not on file   Food Insecurity:     Worried About Running Out of Food in the Last Year: Not on file    Chris of Food in the Last Year: Not on file Transportation Needs:     Lack of Transportation (Medical): Not on file    Lack of Transportation (Non-Medical): Not on file   Physical Activity:     Days of Exercise per Week: Not on file    Minutes of Exercise per Session: Not on file   Stress:     Feeling of Stress : Not on file   Social Connections:     Frequency of Communication with Friends and Family: Not on file    Frequency of Social Gatherings with Friends and Family: Not on file    Attends Orthodoxy Services: Not on file    Active Member of [address] [place] [address] [employer] or Organizations: Not on file    Attends Club or Organization Meetings: Not on file    Marital Status: Not on file   Intimate Partner Violence:     Fear of Current or Ex-Partner: Not on file    Emotionally Abused: Not on file    Physically Abused: Not on file    Sexually Abused: Not on file   Housing Stability:     Unable to Pay for Housing in the Last Year: Not on file    Number of [place] in the Last Year: Not on file    Unstable Housing in the Last Year: Not on file          MEDICATIONS reviewed by me:  Prior to Admission Medications:  No current facility-administered medications on file prior to encounter.      Current Outpatient Medications on File Prior to Encounter   Medication Sig Dispense Refill    ALPRAZolam (XANAX XR) 3 MG extended release tablet TAKE ONE TABLET BY MOUTH EVERY MORNING 30 tablet 0    pregabalin (LYRICA) 75 MG capsule TAKE ONE CAPSULE BY MOUTH EVERY NIGHT 30 capsule 0    albuterol (PROVENTIL) 2.5 MG/0.5ML NEBU nebulizer solution Take 0.5 mLs by nebulization every 6 hours as needed for Wheezing or Shortness of Breath 20 mL 2    furosemide (LASIX) 40 MG tablet Take 40 mg by mouth daily       methyl salicylate-menthol (RANDI LEBLANC GREASELESS) 10-15 % CREA Apply topically 3 times daily as needed for Pain 57 g 0    buPROPion (WELLBUTRIN XL) 150 MG extended release tablet Take 1 tablet by mouth every morning 30 tablet 1    spironolactone (ALDACTONE) 50 MG tablet TAKE ONE TABLET BY MOUTH DAILY 30 tablet 0    propranolol (INDERAL LA) 80 MG extended release capsule TAKE ONE CAPSULE BY MOUTH EVERY MORNING 30 capsule 0    amLODIPine (NORVASC) 10 MG tablet       hydrOXYzine (ATARAX) 25 MG tablet       benzonatate (TESSALON) 200 MG capsule Take 1 capsule by mouth every 8 hours as needed for Cough 15 capsule 1    albuterol sulfate  (90 Base) MCG/ACT inhaler Inhale 2 puffs into the lungs every 6 hours as needed for Wheezing or Shortness of Breath 18 g 1    Dulaglutide 1.5 MG/0.5ML SOPN Inject 1.5 mg into the skin once a week 4 pen 1    cloNIDine (CATAPRES) 0.2 MG/24HR PTWK Place 1 patch onto the skin once a week 4 patch 2    Misc.  Devices (PULSE OXIMETER) MISC 1 each by Does not apply route every 6-8 hours as needed (shortness of breath) 1 each 0    Nasal Dilators (BREATHE RIGHT EXTRA STRENGTH) STRP 1 strip by Does not apply route nightly as needed (nasal congestion) 8 strip 2    albuterol sulfate HFA (PROVENTIL HFA) 108 (90 Base) MCG/ACT inhaler Inhale 2 puffs into the lungs every 4 hours as needed for Wheezing 18 g 0    insulin glargine (LANTUS SOLOSTAR) 100 UNIT/ML injection pen Inject 10 Units into the skin every morning       lisinopril (PRINIVIL;ZESTRIL) 40 MG tablet Take 40 mg by mouth daily      glycopyrrolate-formoterol (BEVESPI AEROSPHERE) 9-4.8 MCG/ACT AERO Inhale 2 puffs into the lungs 2 times daily 10.7 g 2    atorvastatin (LIPITOR) 40 MG tablet Take 1 tablet by mouth nightly 30 tablet 3    Insulin Pen Needle 29G X 12.7MM MISC 1 each by Does not apply route daily 100 each 5    aspirin 81 MG EC tablet Take 1 tablet by mouth daily 30 tablet 3    insulin lispro, 1 Unit Dial, 100 UNIT/ML SOPN Inject 0-6 Units into the skin 3 times daily (with meals) **Corrective Low Dose Algorithm**  Glucose: Dose:               No Insulin  140-199 1 Unit  200-249 2 Units  250-299 3 Units  300-349 4 Units  350-399 5 Units  Over 399 6 Units 3 pen 0    Heat Wraps [facility] BACK/HIP) MISC 1 each by Does not apply route daily as needed (back pain) 3 each 5    fluvoxaMINE (LUVOX) 100 MG tablet Take 1 tablet by mouth nightly 30 tablet 1    glucose (GLUTOSE) 40 % GEL Take 37.5 mLs by mouth as needed (low blood sugar) 45 g 1       Medications Prior to Admission: ALPRAZolam (XANAX XR) 3 MG extended release tablet, TAKE ONE TABLET BY MOUTH EVERY MORNING  pregabalin (LYRICA) 75 MG capsule, TAKE ONE CAPSULE BY MOUTH EVERY NIGHT  albuterol (PROVENTIL) 2.5 MG/0.5ML NEBU nebulizer solution, Take 0.5 mLs by nebulization every 6 hours as needed for Wheezing or Shortness of Breath  furosemide (LASIX) 40 MG tablet, Take 40 mg by mouth daily   methyl salicylate-menthol (RANDI LEBLANC GREASELESS) 10-15 % CREA, Apply topically 3 times daily as needed for Pain  buPROPion (WELLBUTRIN XL) 150 MG extended release tablet, Take 1 tablet by mouth every morning  spironolactone (ALDACTONE) 50 MG tablet, TAKE ONE TABLET BY MOUTH DAILY  propranolol (INDERAL LA) 80 MG extended release capsule, TAKE ONE CAPSULE BY MOUTH EVERY MORNING  amLODIPine (NORVASC) 10 MG tablet,   hydrOXYzine (ATARAX) 25 MG tablet,   benzonatate (TESSALON) 200 MG capsule, Take 1 capsule by mouth every 8 hours as needed for Cough  albuterol sulfate  (90 Base) MCG/ACT inhaler, Inhale 2 puffs into the lungs every 6 hours as needed for Wheezing or Shortness of Breath  Dulaglutide 1.5 MG/0.5ML SOPN, Inject 1.5 mg into the skin once a week  cloNIDine (CATAPRES) 0.2 MG/24HR PTWK, Place 1 patch onto the skin once a week  Misc.  Devices (PULSE OXIMETER) MISC, 1 each by Does not apply route every 6-8 hours as needed (shortness of breath)  Nasal Dilators (BREATHE RIGHT EXTRA STRENGTH) STRP, 1 strip by Does not apply route nightly as needed (nasal congestion)  albuterol sulfate HFA (PROVENTIL HFA) 108 (90 Base) MCG/ACT inhaler, Inhale 2 puffs into the lungs every 4 hours as needed for Wheezing  insulin glargine (LANTUS SOLOSTAR) 100 UNIT/ML injection pen, Inject 10 Units into the skin every morning   lisinopril (PRINIVIL;ZESTRIL) 40 MG tablet, Take 40 mg by mouth daily  glycopyrrolate-formoterol (BEVESPI AEROSPHERE) 9-4.8 MCG/ACT AERO, Inhale 2 puffs into the lungs 2 times daily  atorvastatin (LIPITOR) 40 MG tablet, Take 1 tablet by mouth nightly  Insulin Pen Needle 29G X 12.7MM MISC, 1 each by Does not apply route daily  aspirin 81 MG EC tablet, Take 1 tablet by mouth daily  insulin lispro, 1 Unit Dial, 100 UNIT/ML SOPN, Inject 0-6 Units into the skin 3 times daily (with meals) **Corrective Low Dose Algorithm** Glucose: Dose:              No Insulin 140-199 1 Unit 200-249 2 Units 250-299 3 Units 300-349 4 Units 350-399 5 Units Over 399 6 Units  Heat Wraps (THERMACARE BACK/HIP) MISC, 1 each by Does not apply route daily as needed (back pain)  fluvoxaMINE (LUVOX) 100 MG tablet, Take 1 tablet by mouth nightly  glucose (GLUTOSE) 40 % GEL, Take 37.5 mLs by mouth as needed (low blood sugar)     Inpatient Medications:  Scheduled Meds:   aspirin  81 mg Oral Daily    atorvastatin  40 mg Oral Nightly    tiotropium-olodaterol  2 puff Inhalation Daily    insulin glargine  20 Units SubCUTAneous QAM    lisinopril  40 mg Oral Daily    cloNIDine  1 patch TransDERmal Weekly    amLODIPine  10 mg Oral Daily    spironolactone  50 mg Oral Daily    propranolol  80 mg Oral Daily    furosemide  10 mg Oral Daily    buPROPion  150 mg Oral QAM    pregabalin  75 mg Oral Nightly    sodium chloride flush  10 mL IntraVENous 2 times per day    heparin (porcine)  5,000 Units SubCUTAneous 3 times per day    insulin lispro  0-12 Units SubCUTAneous TID WC    insulin lispro  0-6 Units SubCUTAneous Nightly    ALPRAZolam  0.75 mg Oral 4 times per day     Continuous Infusions:   sodium chloride      dextrose       PRN Meds:.albuterol sulfate HFA, hydrOXYzine, benzonatate, sodium chloride flush, sodium chloride, ondansetron **OR** ondansetron, acetaminophen **OR** acetaminophen, hydrALAZINE, sodium chloride, glucose, dextrose, glucagon (rDNA), dextrose    Allergies: Amoxicillin, Levofloxacin, Vancomycin, and Tape [adhesive tape]    REVIEW OF SYSTEMS:  As mentioned in HPI and CC  All other 10-point review of systems: negative.             PHYSICAL EXAM:  Patient Vitals for the past 24 hrs:   BP Temp Temp src Pulse Resp SpO2 Height Weight   01/05/22 0820 (!) 162/84 97.5 °F (36.4 °C) Axillary 86 24 98 % -- --   01/05/22 0415 (!) 155/80 98.1 °F (36.7 °C) Oral 90 14 92 % -- --   01/05/22 0207 -- -- -- -- -- -- 5' 7\" (1.702 m) 181 lb 6.4 oz (82.3 kg)   01/05/22 0130 (!) 145/76 97.8 °F (36.6 °C) Oral 91 14 99 % -- --   01/05/22 0030 (!) 126/101 -- -- 88 13 -- -- --   01/05/22 0015 (!) 161/88 -- -- 86 19 95 % -- --   01/05/22 0000 (!) 168/102 -- -- 85 19 95 % -- --   01/04/22 2345 (!) 139/109 -- -- 84 18 97 % -- --   01/04/22 2330 (!) 169/89 -- -- 85 18 97 % -- --   01/04/22 2315 (!) 169/97 -- -- 83 18 94 % -- --   01/04/22 2300 (!) 153/95 -- -- 83 18 94 % -- --   01/04/22 2245 (!) 149/95 -- -- 83 17 96 % -- --   01/04/22 2230 (!) 165/95 -- -- 83 16 97 % -- --   01/04/22 2215 (!) 161/94 -- -- 82 16 97 % -- --   01/04/22 2200 (!) 157/101 -- -- 82 16 98 % -- --   01/04/22 2145 (!) 169/94 -- -- 82 18 99 % -- --   01/04/22 2130 (!) 163/86 -- -- 81 18 99 % -- --   01/04/22 2115 (!) 161/89 -- -- 80 18 98 % -- --   01/04/22 2100 (!) 172/93 -- -- 80 17 100 % -- --   01/04/22 2045 (!) 172/87 -- -- 79 18 97 % -- --   01/04/22 2030 (!) 172/84 -- -- 80 18 95 % -- --   01/04/22 2015 (!) 159/76 -- -- 78 16 100 % -- --   01/04/22 2000 (!) 172/141 -- -- 78 16 99 % -- --   01/04/22 1945 (!) 155/80 -- -- 77 16 -- -- --   01/04/22 1930 (!) 169/80 -- -- 77 17 -- -- --   01/04/22 1915 (!) 149/78 -- -- 77 19 -- -- --   01/04/22 1900 129/72 -- -- 75 14 -- -- --   01/04/22 1800 129/84 -- -- 74 15 95 % -- --   01/04/22 1745 136/78 -- -- 75 15 95 % -- --   01/04/22 1730 137/70 -- -- 75 15 93 % -- --   01/04/22 1715 (!) 146/77 -- -- 74 15 -- -- --   01/04/22 1700 (!) 143/82 -- -- 76 15 -- -- --   01/04/22 1645 137/78 -- -- 76 15 -- -- --   01/04/22 1630 (!) 154/76 -- -- 77 15 92 % -- --   01/04/22 1615 (!) 150/88 -- -- 77 14 95 % -- --   01/04/22 1600 139/84 -- -- -- -- -- -- --   01/04/22 1530 (!) 154/85 -- -- 77 16 -- -- --   01/04/22 1515 -- -- -- 78 15 (!) 86 % -- --   01/04/22 1500 -- -- -- 79 15 (!) 89 % -- --   01/04/22 1445 -- -- -- 79 15 92 % -- --   01/04/22 1415 (!) 153/80 -- -- 81 14 96 % -- --   01/04/22 1152 134/71 98 °F (36.7 °C) -- 85 17 97 % -- 187 lb (84.8 kg)       Intake/Output Summary (Last 24 hours) at 1/5/2022 0903  Last data filed at 1/5/2022 0030  Gross per 24 hour   Intake --   Output 900 ml   Net -900 ml       Physical Exam  Vitals reviewed. Constitutional:       General: She is not in acute distress. Appearance: Normal appearance. She is ill-appearing. Comments: Obese body habitus   HENT:      Head: Normocephalic and atraumatic. Right Ear: External ear normal.      Left Ear: External ear normal.      Nose: Nose normal.      Mouth/Throat:      Mouth: Mucous membranes are moist. Mucous membranes are not dry. Eyes:      General: No scleral icterus. Conjunctiva/sclera: Conjunctivae normal.   Neck:      Thyroid: No thyroid mass. Vascular: No JVD. Trachea: Trachea normal.   Cardiovascular:      Rate and Rhythm: Normal rate and regular rhythm. Heart sounds: S1 normal and S2 normal.   Pulmonary:      Effort: Pulmonary effort is normal. No respiratory distress. Breath sounds: Normal breath sounds. Abdominal:      General: Bowel sounds are normal. There is no distension. Palpations: Abdomen is soft. Musculoskeletal:         General: Swelling (mild) present. No deformity. Cervical back: Normal range of motion and neck supple. Skin:     General: Skin is warm and dry. Coloration: Skin is not jaundiced.    Neurological:      Mental Status: She is alert and oriented to person, place, and time. Mental status is at baseline. Psychiatric:         Mood and Affect: Mood normal.         Behavior: Behavior normal.          DATA:  Diagnostic tests reviewed for today's visit:    Recent Labs     01/04/22  1308 01/05/22  0625   WBC 13.6* 15.1*   HCT 36.5 33.0*     --      Iron Saturation:  No components found for: PERCENTFE  FERRITIN:    Lab Results   Component Value Date    FERRITIN 112.0 08/28/2021     IRON:    Lab Results   Component Value Date    IRON 36 01/04/2022     TIBC:    Lab Results   Component Value Date    TIBC 299 01/04/2022       Recent Labs     01/04/22  1309 01/05/22  0625   * 132*   K 4.3 4.5   CL 94* 96*   CO2 19* 18*   BUN 58* 58*   CREATININE 8.4* 8.8*     Recent Labs     01/04/22  1309 01/05/22  0625   CALCIUM 8.7 8.5     No results for input(s): PH, PCO2, PO2 in the last 72 hours. Invalid input(s): Diego Dumont    ABG:  No results found for: PH, PCO2, PO2, HCO3, BE, THGB, TCO2, O2SAT  VBG:    Lab Results   Component Value Date    PHVEN 7.302 11/02/2021    OJB1VTK 60.7 11/02/2021    BEVEN 2.6 11/02/2021    [id number] 100 11/02/2021           BELOW MENTIONED RADIOLOGY STUDY RESULTS REVIEWED BY ME:    CT Head WO Contrast    Result Date: 1/4/2022  EXAMINATION: CT OF THE HEAD WITHOUT CONTRAST  1/4/2022 12:38 pm TECHNIQUE: CT of the head was performed without the administration of intravenous contrast. Dose modulation, iterative reconstruction, and/or weight based adjustment of the mA/kV was utilized to reduce the radiation dose to as low as reasonably achievable. COMPARISON: None. HISTORY: ORDERING SYSTEM PROVIDED HISTORY: Frequent falls TECHNOLOGIST PROVIDED HISTORY: Reason for exam:->Frequent falls Has a \"code stroke\" or \"stroke alert\" been called? ->No Decision Support Exception - unselect if not a suspected or confirmed emergency medical condition->Emergency Medical Condition (MA) Is the patient pregnant?->No Reason for Exam: frequent falls , hx cva FINDINGS: BRAIN/VENTRICLES: There is no acute intracranial hemorrhage, mass effect or midline shift. No abnormal extra-axial fluid collection. The gray-white differentiation is maintained without evidence of an acute infarct. There is no evidence of hydrocephalus. ORBITS: The visualized portion of the orbits demonstrate no acute abnormality. SINUSES: The visualized paranasal sinuses and mastoid air cells demonstrate no acute abnormality. SOFT TISSUES/SKULL:  No acute abnormality of the visualized skull or soft tissues. No acute intracranial abnormality. RECOMMENDATIONS: Unavailable     CT HEAD WO CONTRAST    Result Date: 12/27/2021  EXAMINATION: CT OF THE HEAD WITHOUT CONTRAST  12/27/2021 4:50 pm TECHNIQUE: CT of the head was performed without the administration of intravenous contrast. Dose modulation, iterative reconstruction, and/or weight based adjustment of the mA/kV was utilized to reduce the radiation dose to as low as reasonably achievable. COMPARISON: 08/27/2021 HISTORY: ORDERING SYSTEM PROVIDED HISTORY: falls TECHNOLOGIST PROVIDED HISTORY: Has a \"code stroke\" or \"stroke alert\" been called? ->No Reason for exam:->falls Decision Support Exception - unselect if not a suspected or confirmed emergency medical condition->Emergency Medical Condition (MA) Is the patient pregnant?->No Reason for Exam: Fall, Fall (fell at dialysis today, was putting her shoe on and fell, unsure if she hit her head, no LOC, denies pain anywhere). FINDINGS: BRAIN/VENTRICLES: There is no acute intracranial hemorrhage, mass effect or midline shift. No abnormal extra-axial fluid collection. The gray-white differentiation is maintained without evidence of an acute infarct. There is no evidence of hydrocephalus. Patchy hypodensities in the periventricular and subcortical white matter, which are nonspecific, but may represent chronic small vessel ischemic change.  ORBITS: The visualized portion of the orbits demonstrate no acute abnormality. SINUSES: The visualized paranasal sinuses and mastoid air cells demonstrate no acute abnormality. SOFT TISSUES/SKULL:  No acute abnormality of the visualized skull or soft tissues. No acute intracranial abnormality. CT CERVICAL SPINE WO CONTRAST    Result Date: 12/27/2021  EXAMINATION: CT OF THE CERVICAL SPINE WITHOUT CONTRAST 12/27/2021 4:49 pm TECHNIQUE: CT of the cervical spine was performed without the administration of intravenous contrast. Multiplanar reformatted images are provided for review. Dose modulation, iterative reconstruction, and/or weight based adjustment of the mA/kV was utilized to reduce the radiation dose to as low as reasonably achievable. COMPARISON: None. HISTORY: ORDERING SYSTEM PROVIDED HISTORY: fall TECHNOLOGIST PROVIDED HISTORY: Reason for exam:->fall Decision Support Exception - unselect if not a suspected or confirmed emergency medical condition->Emergency Medical Condition ([place]) Is the patient pregnant?->No Reason for Exam: Fall, Fall (fell at dialysis today, was putting her shoe on and fell, unsure if she hit her head, no LOC, denies pain anywhere). FINDINGS: BONES/ALIGNMENT: There is mild disc space narrowing throughout. There is no acute fracture or traumatic malalignment. DEGENERATIVE CHANGES: No significant degenerative changes. SOFT TISSUES: There is no prevertebral soft tissue swelling. There is a 2.5 cm hypodensity left lobe of thyroid gland inferiorly. There is a right IJ catheter in place. The lung apices are clear. Mild degenerative disc space narrowing throughout with no acute abnormality seen. 2.5 cm hypodensity left lobe of the thyroid gland. Suggest ultrasound follow-up.  RECOMMENDATIONS: Unavailable     CT THORACIC SPINE WO CONTRAST    Result Date: 12/27/2021  EXAMINATION: CT OF THE THORACIC SPINE WITHOUT CONTRAST  12/27/2021 4:50 pm: TECHNIQUE: CT of the thoracic spine was performed without the administration of intravenous contrast. Multiplanar reformatted images are provided for review. Dose modulation, iterative reconstruction, and/or weight based adjustment of the mA/kV was utilized to reduce the radiation dose to as low as reasonably achievable. COMPARISON: None. HISTORY: ORDERING SYSTEM PROVIDED HISTORY: fall TECHNOLOGIST PROVIDED HISTORY: Reason for exam:->fall Is the patient pregnant?->No Reason for Exam: Fall, Fall (fell at dialysis today, was putting her shoe on and fell, unsure if she hit her head, no LOC, denies pain anywhere). FINDINGS: BONES/ALIGNMENT: There is mild scoliotic curvature of the thoraco lumbar spine, with dextroconvexity. .  The vertebral body heights are maintained. No osseous destructive lesion is seen. DEGENERATIVE CHANGES: Anterolateral spurring is noted at multiple levels. There is no significant narrowing of the central canal. SOFT TISSUES: No paraspinal mass is seen. No acute fracture or subluxation of the thoracic spine. Multilevel spondylosis the RECOMMENDATIONS: Unavailable     CT LUMBAR SPINE WO CONTRAST    Result Date: 12/27/2021  EXAMINATION: CT OF THE LUMBAR SPINE WITHOUT CONTRAST  12/27/2021 TECHNIQUE: CT of the lumbar spine was performed without the administration of intravenous contrast. Multiplanar reformatted images are provided for review. Adjustment of mA and/or kV according to patient size was utilized. Dose modulation, iterative reconstruction, and/or weight based adjustment of the mA/kV was utilized to reduce the radiation dose to as low as reasonably achievable.  COMPARISON: None HISTORY: ORDERING SYSTEM PROVIDED HISTORY: fall TECHNOLOGIST PROVIDED HISTORY: Reason for exam:->fall Decision Support Exception - unselect if not a suspected or confirmed emergency medical condition->Emergency Medical Condition ([place]) Is the patient pregnant?->No Reason for Exam: Fall, Fall (fell at dialysis today, was putting her shoe on and fell, unsure if she hit her head, no LOC, denies pain anywhere). FINDINGS: BONES/ALIGNMENT: There is normal alignment of the spine. The vertebral body heights are maintained. No osseous destructive lesion is seen. DEGENERATIVE CHANGES: Mild facet arthropathy. Anterior marginal endplate spurs most pronounced at L1-L2. Otherwise minimal disc space disease identified. SOFT TISSUES/RETROPERITONEUM: No paraspinal mass is seen. There is a borderline retroperitoneal lymph nodes up to 9 mm short axis dimension. No evidence acute fracture traumatic malalignment of the lumbar spine. Borderline/upper limits normal lymph nodes within the retroperitoneum 9 mm short axis dimension. Please correlate with history and laboratory testing. RECOMMENDATIONS: Unavailable     XR CHEST PORTABLE    Result Date: 1/4/2022  EXAMINATION: ONE XRAY VIEW OF THE CHEST 1/4/2022 12:25 pm COMPARISON: 12/27/2021 HISTORY: ORDERING SYSTEM PROVIDED HISTORY: Frequent falls TECHNOLOGIST PROVIDED HISTORY: Reason for exam:->Frequent falls Reason for Exam: Fall (Pt reports frequent falls FINDINGS: Right-sided central venous catheter remains in place. The lungs are without acute focal process. There is no effusion or pneumothorax. The cardiomediastinal silhouette is stable. The osseous structures are stable. No acute process. XR CHEST PORTABLE    Result Date: 12/27/2021  EXAMINATION: ONE X-RAY VIEW OF THE CHEST 12/27/2021 3:41 pm COMPARISON: November 2, 2021 HISTORY: ORDERING SYSTEM PROVIDED HISTORY:  Falls TECHNOLOGIST PROVIDED HISTORY: Reason for Exam:  Falls Reason for Exam:  Falls FINDINGS: The cardiomediastinal silhouette is enlarged but stable. Right-sided central venous catheter extends to the right atrium. Lung volumes are low. No evidence of acute pulmonary edema or acute infiltrate. No pleural effusion or pneumothorax. Stable cardiomegaly. No acute cardiopulmonary process.      XR HIP 2-3 VW W PELVIS RIGHT    Result Date: 1/4/2022  EXAMINATION: ONE XRAY VIEW OF THE PELVIS AND TWO XRAY VIEWS RIGHT HIP 1/4/2022 2:07 pm COMPARISON: None. HISTORY: ORDERING SYSTEM PROVIDED HISTORY:  Fall TECHNOLOGIST PROVIDED HISTORY: Reason For Exam:   Fall FINDINGS: Frontal view of the pelvis and dedicated imaging of the right hip demonstrate no acute fracture or dislocation. Normal bony mineralization. No significant bilateral hip osteoarthritis. SI joints and sacral arcuate lines appear intact. Evaluation is mildly limited by overlying bowel gas and stool. No soft tissue abnormality. 1. No acute osseous abnormality of the pelvis and right hip.

## 2022-01-04 NOTE — ED NOTES
Presents to the ED for increasing, decreased mobility & frequent falls. Family reports recent admission for similar problem with no confirmed dx. Also concerned for increased lethargy. Pt reports, \"crawling/rolling on the floor for two nights; unable to get up. \"  Pain to right hip & notable bruising to right elbow. Has not attended dialysis for one week. Monitors in place.        Rama Barlow RN  01/04/22 [id number]

## 2022-01-04 NOTE — ED PROVIDER NOTES
I independently performed a history and physical on [facility]. I personally saw the patient and performed a substantive portion of the visit including all aspects of the medical decision making. Briefly, this is a 55 y.o. female here for falls and inability to perform activities of daily living. Typically her  takes care of her but she states that he is unable to do this now. She states that she fell 7 times within the past week. She does not think she broke anything from her falls. She does have some right hip pain. She has baseline peripheral neuropathy in her fingertips and feet which is unchanged. Denies any new weakness. No recent fever or illness. No cough. Has not had dialysis for the past week    On exam,   General: Patient is in no acute distress  Skin: No cyanosis  HEENT: Moist mucous membranes  Heart: Regular rate, regular rhythm  Lung: No respiratory distress  Abdomen: Soft, nontender  Neuro: Moving all extremities, no slurred speech, answers questions appropriately, normal finger-to-nose        EKG  The Ekg interpreted by me in the absence of a cardiologist shows. Normal sinus rhythm  No acute ST changes   HR 83  No significant EKG changes compared to study in 12/27        Screenings            MDM  Briefly, this is a 55 y.o. female here for falls. Falls may be a result of prior stroke. She at baseline has frequent falls however this has been worsening over this past week. May be since she has not had dialysis over the past week. She no longer has the help at home to take care of her that she needs. Will likely require admission for dialysis and placement. She has some mild right hip pain after her falls. Doubt there is a fracture. Will obtain x-ray. Obtaining head CT to evaluate for intracranial bleed. .         Patient Referrals:  No follow-up provider specified.     Discharge Medications:  New Prescriptions    No medications on file       FINAL IMPRESSION  1. Frequent falls    2. General weakness    3. Debility    4. ESRD on hemodialysis (HCC)        Blood pressure (!) 154/85, pulse 77, temperature 98 °F (36.7 °C), resp. rate 16, weight 187 lb (84.8 kg), last menstrual period 12/27/2021, SpO2 (!) 86 %. For further details of [facility] emergency department encounter, please see documentation by advanced practice provider, Rhonda Maldonado.        Kirsten Novoa MD  01/04/22 [id number]

## 2022-01-04 NOTE — ED NOTES
Noted O2 sat dropping, while pt sleeping.   2L NC applied with sat maintained above 95%     She Redd RN  01/04/22 [id number]

## 2022-01-04 NOTE — H&P
History and Physical  Dr. Neyda Sharma  1/4/2022    PCP: Pallavi Armstrong    Cc:   Chief Complaint   Patient presents with   Kumar Fall     Pt reports frequent falls        HPI:  Quique Quinteros is a 55 y.o. female who has a past medical history of Blind in both eyes, Cerebral artery occlusion with cerebral infarction ([address] CHF (congestive heart failure) ([address] Chronic kidney disease, Depression, Diabetes mellitus out of control ([address] Diabetes mellitus, type II ([address] Diabetic neuropathy associated with type 2 diabetes mellitus ([address] Generalized headaches, Hypertension, Infertility, Insomnia, Migraine headache, Mixed hyperlipidemia, Otitis media, Pelvic abscess in female, Pneumonia, Stroke ([address] and Stroke ([address] Patient presents with ESRD (end stage renal disease) ([address] HPI  (1-3 for expanded problem focused, ?4 for detailed/comprehensive)     55 y.o. female who presents with the above complaint frequent falls. The patient states has fall issues. However over the past several days she has had increasing falls with subsequent bruising. She does live in a condo/apartment with her . She does indicate that he has indicated to her that it is becoming more difficult to care for her. 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 She has been admitted this facility for general weakness on November 2, 2021 and December 27, 2021. Patient diagnosis upon admission and discharge was hyperkalemia, general weakness, ESRD on dialysis.     Patient is followed by Dr. Lavon Alonzo nephrology. The patient is dialyzed Monday, Wednesday and Friday. Port in the right chest.  Dialyzed 3-3.5 hours. Patient last dialyzed last Wednesday or about 6 days ago. She missed her Friday and Monday dialysis appointment. This may be reason for her increased falling over the past few days.     Old chart in epic reviewed  She has presented a number of times for similar sx  PT/OT scores at last admit were surprisingly high and she did not qualify from SNF at that time  However, given her recurrent falls and recurrent admission, it is clear that she is not well suited to care for herself    Problem list of hospitalization thus far: Active Hospital Problems    Diagnosis     ESRD (end stage renal disease) ([facility] [N18.6]     Polyneuropathy due to type 2 diabetes mellitus ([facility] [E11.42]     HTN (hypertension), benign [I10]     DM (diabetes mellitus), secondary, uncontrolled, w/neurologic complic ([facility] [Z03.42, Z82.59]     Anemia [D64.9]     Mixed hyperlipidemia [E78.2]          Review of Systems: (1 system for EPF, 2-9 for detailed, 10+ for comprehensive)  Constitutional: Negative for chills and fever. HENT: Negative for dental problem, nosebleeds and rhinorrhea. Eyes: Negative for photophobia and visual disturbance. Respiratory: Negative for cough, chest tightness and shortness of breath. Cardiovascular: Negative for chest pain and positive for leg swelling. Gastrointestinal: Negative for diarrhea, nausea and vomiting. Endocrine: Negative for polydipsia and polyphagia. Genitourinary: Negative for frequency, hematuria and urgency. Musculoskeletal: Negative for back pain and myalgias. Skin: Negative for rash. Allergic/Immunologic: Negative for food allergies. Neurological: Negative for dizziness, seizures, syncope and facial asymmetry. +residual weakness  Hematological: Negative for adenopathy. Psychiatric/Behavioral: Negative for dysphoric mood. The patient is not nervous/anxious.              Past Medical History:   Past Medical History:   Diagnosis Date    Blind in both eyes     Cerebral artery occlusion with cerebral infarction ([facility]     CHF (congestive heart failure) (HCC)     Chronic kidney disease     Depression     Diabetes mellitus out of control ([facility]     Diabetes mellitus, type II ([facility]     2005    Diabetic neuropathy associated with type 2 diabetes mellitus ([facility]     Generalized headaches     Hypertension     Infertility     Insomnia     chronic vs lack of time spent to sleep    Migraine headache 11/09/2011    Mixed hyperlipidemia     Otitis media     h/o recurrent    Pelvic abscess in female 10/05/2013    Pneumonia     2004 approx.  Stroke ([facility] 08/27/2020    Stroke ([facility] 08/27/2021       Past Surgical History:   Past Surgical History:   Procedure Laterality Date    CERVIX SURGERY      laser tx for dysplasia;1992    EYE SURGERY      FOOT SURGERY Right     FOOT SURGERY Bilateral     FOOT SURGERY Left     IR TUNNELED CATHETER PLACEMENT GREATER THAN 5 YEARS  9/7/2021    IR TUNNELED CATHETER PLACEMENT GREATER THAN 5 YEARS 9/7/2021 Kiran Pinedo MD [facility] SPECIAL PROCEDURES       Social History:   Social History     Tobacco History     Smoking Status  Former Smoker Smoking Frequency  1 pack/day Smoking Tobacco Type  Cigarettes    Smokeless Tobacco Use  Never Used    Tobacco Comment  Quit in August 2021          Alcohol History     Alcohol Use Status  No Comment  Very Rare          Drug Use     Drug Use Status  No          Sexual Activity     Sexually Active  Yes Partners  Male                Fam History:   Family History   Problem Relation Age of Onset    Diabetes Mother    Katherin Pro Other Mother 79        Covid    Diabetes Father     High Blood Pressure Father     Colon Cancer Father     Diabetes Sister     Alcohol Abuse Maternal Grandfather     Diabetes Paternal Grandmother     Alcohol Abuse Paternal Grandfather     Diabetes Paternal Aunt     Diabetes Paternal Uncle        PFSH: The above PMHx, PSHx, SocHx, FamHx has been reviewed by myself. (1 area for detailed, 2-3 for comprehensive)      Code Status: Prior    Meds - following list of home medications fromelectronic chart has been reviewed by myself  Prior to Admission medications    Medication Sig Start Date End Date Taking?  Authorizing Provider   ALPRAZolam (XANAX XR) 3 MG extended release tablet TAKE ONE TABLET BY MOUTH EVERY MORNING 1/3/22 2/2/22  Damon Mireles   pregabalin (LYRICA) 75 MG capsule TAKE ONE CAPSULE BY MOUTH EVERY NIGHT 1/3/22 2/2/22  Damon Mireles   albuterol (PROVENTIL) 2.5 MG/0.5ML NEBU nebulizer solution Take 0.5 mLs by nebulization every 6 hours as needed for Wheezing or Shortness of Breath 12/16/21   Damon Mireles   furosemide (LASIX) 40 MG tablet  12/8/21   Historical Provider, MD   [facility] BACK/HIP) MISC 1 each by Does not apply route daily as needed (back pain) 12/15/21   Atif Appl   methyl salicylate-menthol (RANDI LEBLANC GREASELESS) 10-15 % CREA Apply topically 3 times daily as needed for Pain 12/15/21   Damon Mireles   buPROPion (WELLBUTRIN XL) 150 MG extended release tablet Take 1 tablet by mouth every morning 12/15/21   Damon Mireles   spironolactone (ALDACTONE) 50 MG tablet TAKE ONE TABLET BY MOUTH DAILY 12/10/21   Damon Mireles   propranolol (INDERAL LA) 80 MG extended release capsule TAKE ONE CAPSULE BY MOUTH EVERY MORNING 12/10/21   Damon Mireles   amLODIPine (NORVASC) 10 MG tablet  11/17/21   Historical Provider, MD   hydrOXYzine (ATARAX) 25 MG tablet  10/14/21   Historical Provider, MD   benzonatate (TESSALON) 200 MG capsule Take 1 capsule by mouth every 8 hours as needed for Cough 11/30/21   Damon Mireles   albuterol sulfate  (90 Base) MCG/ACT inhaler Inhale 2 puffs into the lungs every 6 hours as needed for Wheezing or Shortness of Breath 11/30/21   Damon Mireles   fluvoxaMINE (LUVOX) 100 MG tablet Take 1 tablet by mouth nightly 11/30/21 12/30/21  Damon Mireles   Dulaglutide 1.5 MG/0.5ML SOPN Inject 1.5 mg into the skin once a week 11/30/21   Damon Mireles   cloNIDine (CATAPRES) 0.2 MG/24HR PTWK Place 1 patch onto the skin once a week 11/1/21   [address]  Devices (PULSE OXIMETER) MISC 1 each by Does not apply route every 6-8 hours as needed (shortness of breath) 11/1/21 Damon Mireles   Nasal Dilators (BREATHE RIGHT EXTRA STRENGTH) STRP 1 strip by Does not apply route nightly as needed (nasal congestion) 11/1/21   Damon Mireles   albuterol sulfate HFA (PROVENTIL HFA) 108 (90 Base) MCG/ACT inhaler Inhale 2 puffs into the lungs every 4 hours as needed for Wheezing 10/21/21   Fanta Espinoza MD   insulin glargine (LANTUS SOLOSTAR) 100 UNIT/ML injection pen Inject 20 Units into the skin every morning     Historical Provider, MD   lisinopril (PRINIVIL;ZESTRIL) 40 MG tablet Take 40 mg by mouth daily    Historical Provider, MD   glycopyrrolate-formoterol (Sergio Moros) 9-4.8 MCG/ACT AERO Inhale 2 puffs into the lungs 2 times daily 9/23/21   Damon Mireles   glucose (GLUTOSE) 40 % GEL Take 37.5 mLs by mouth as needed (low blood sugar) 9/13/21   Kathleen Roman MD   atorvastatin (LIPITOR) 40 MG tablet Take 1 tablet by mouth nightly 7/8/21   Damon Mireles   Insulin Pen Needle 29G X 12.7MM MISC 1 each by Does not apply route daily 7/8/21   Gauri Kurtz   aspirin 81 MG EC tablet Take 1 tablet by mouth daily 9/1/20   Rivera Cornelius MD   insulin lispro, 1 Unit Dial, 100 UNIT/ML SOPN Inject 0-6 Units into the skin 3 times daily (with meals) **Corrective Low Dose Algorithm**  Glucose: Dose:               No Insulin  140-199 1 Unit  200-249 2 Units  250-299 3 Units  300-349 4 Units  350-399 5 Units  Over 399 6 Units 4/29/20   Kaelyn Raza MD         Allergies   Allergen Reactions    Amoxicillin Hives, Itching and Other (See Comments)     Tolerates cephalosporins  Patient tolerating cefazolin (ANCEF) as of October 11, 2018      Levofloxacin Anaphylaxis    Vancomycin Anaphylaxis, Hives and Shortness Of Breath    Tape [Adhesive Tape] Other (See Comments)     Paper tape turns skin bright red. Plastic tape okay.               EXAM: (2-7 system for EPF/Detailed, ?8 for Comprehensive)  BP (!) 154/85   Pulse 77   Temp 98 °F (36.7 °C)   Resp 16   Wt 187 lb (84.8 kg) LMP 12/27/2021   SpO2 (!) 86%   BMI 29.29 kg/m²   Constitutional: vitals as above: alert, appears stated age and cooperative    Psychiatric: normal insight and judgment, oriented to person, place, time, and general circumstances    Head: Normocephalic, without obvious abnormality, atraumatic    Eyes:lids and lashes normal, conjunctivae and sclerae normal and pupils equal, round, reactive to light and accomodation    EMNT: external ears normal, nares midline    Neck: no carotid bruit, supple, symmetrical, trachea midline and thyroid not enlarged, symmetric, no tenderness/mass/nodules     Respiratory: clear to auscultation and percussion bilaterally with normal respiratory effort    Cardiovascular: normal rate, regular rhythm, normal S1 and S2 and no murmurs    Gastrointestinal: soft, non-tender, non-distended, normal bowel sounds, no masses or organomegaly    Extremities: no clubbing, 1+ edema    Skin:No rashes or nodules noted.            LABS:  Labs Reviewed   CBC WITH AUTO DIFFERENTIAL - Abnormal; Notable for the following components:       Result Value    WBC 13.6 (*)     Hemoglobin 11.6 (*)     RDW 17.0 (*)     Neutrophils Absolute 10.8 (*)     All other components within normal limits    Narrative:     Performed at:  [facility] [address], [place], [facility] [employer]   Phone [phone]   [address] MG FOR LOW K - Abnormal; Notable for the following components:    Sodium 132 (*)     Chloride 94 (*)     CO2 19 (*)     Anion Gap 19 (*)     Glucose 165 (*)     BUN 58 (*)     CREATININE 8.4 (*)     GFR Non- 5 (*)     GFR  6 (*)     All other components within normal limits    Narrative:     Madelyn Lennox  [facility] tel. [phone],  Chemistry results called to and read back by RN, Lizz Sanders, 01/04/2022  13:50, by [facility]  Performed at:  [facility] [address], [place], [facility] [employer]   Phone [phone]   HEPATIC FUNCTION PANEL - Abnormal; Notable for the following components:    Alkaline Phosphatase 173 (*)     All other components within normal limits    Narrative:     [facility]. [phone],  Chemistry results called to and read back by RN, Reginia Sicard, 01/04/2022  13:50, by [facility]  Performed at:  [facility] [address]   Phone [phone]   TROPONIN - Abnormal; Notable for the following components:    Troponin 0.27 (*)     All other components within normal limits    Narrative:     Rosamariayth Dance  [facility] tel. [phone],  Chemistry results called to and read back by RN, Reginia Sicard, 01/04/2022  13:50, by [facility]  Performed at:  [facility] [address], [address]   Phone 21  - Abnormal; Notable for the following components:    Pro-BNP 13,362 (*)     All other components within normal limits    Narrative:     Rosamariabinta Dance  [facility] tel. [phone],  Chemistry results called to and read back by RN, Reginia Sicard, 01/04/2022  13:50, by [facility]  Performed at:  [facility] [address], [place], [address]   Phone [phone]   CULTURE, BLOOD 2   CULTURE, BLOOD 1   PROTIME-INR    Narrative:     Performed at:  [facility] [address], [place], [address]   Phone [phone]   LACTIC ACID, PLASMA    Narrative:     Performed at:  [facility] [address], [place], [address]   Phone [phone]   HCG, SERUM, QUALITATIVE    Narrative:     Performed at:  [facility] [address], [place], [address]   Phone [phone]   BETA-HYDROXYBUTYRATE    Narrative:     Rosamariabinta Dance  [facility] tel. [phone],  Chemistry results called to and read back by RN, Reginia Sicard, 01/04/2022  13:50, by [facility]  Performed at:  [facility] [address],  [place], [address] [place] [address] Drive   Phone [phone]   URINE RT REFLEX TO CULTURE   HEMOGLOBIN A1C   IRON AND TIBC   VITAMIN B12 & FOLATE         IMAGING:  Imaging results from the ER have been reviewed in the computerized chart. CT Head WO Contrast    Result Date: 1/4/2022  EXAMINATION: CT OF THE HEAD WITHOUT CONTRAST  1/4/2022 12:38 pm TECHNIQUE: CT of the head was performed without the administration of intravenous contrast. Dose modulation, iterative reconstruction, and/or weight based adjustment of the mA/kV was utilized to reduce the radiation dose to as low as reasonably achievable. COMPARISON: None. HISTORY: ORDERING SYSTEM PROVIDED HISTORY: Frequent falls TECHNOLOGIST PROVIDED HISTORY: Reason for exam:->Frequent falls Has a \"code stroke\" or \"stroke alert\" been called? ->No Decision Support Exception - unselect if not a suspected or confirmed emergency medical condition->Emergency Medical Condition (MA) Is the patient pregnant?->No Reason for Exam: frequent falls , hx cva FINDINGS: BRAIN/VENTRICLES: There is no acute intracranial hemorrhage, mass effect or midline shift. No abnormal extra-axial fluid collection. The gray-white differentiation is maintained without evidence of an acute infarct. There is no evidence of hydrocephalus. ORBITS: The visualized portion of the orbits demonstrate no acute abnormality. SINUSES: The visualized paranasal sinuses and mastoid air cells demonstrate no acute abnormality. SOFT TISSUES/SKULL:  No acute abnormality of the visualized skull or soft tissues. No acute intracranial abnormality.  RECOMMENDATIONS: Unavailable     CT HEAD WO CONTRAST    Result Date: 12/27/2021  EXAMINATION: CT OF THE HEAD WITHOUT CONTRAST  12/27/2021 4:50 pm TECHNIQUE: CT of the head was performed without the administration of intravenous contrast. Dose modulation, iterative reconstruction, and/or weight based adjustment of the mA/kV was utilized to reduce the radiation dose to as low as reasonably achievable. COMPARISON: 08/27/2021 HISTORY: ORDERING SYSTEM PROVIDED HISTORY: falls TECHNOLOGIST PROVIDED HISTORY: Has a \"code stroke\" or \"stroke alert\" been called? ->No Reason for exam:->falls Decision Support Exception - unselect if not a suspected or confirmed emergency medical condition->Emergency Medical Condition (MA) Is the patient pregnant?->No Reason for Exam: Fall, Fall (fell at dialysis today, was putting her shoe on and fell, unsure if she hit her head, no LOC, denies pain anywhere). FINDINGS: BRAIN/VENTRICLES: There is no acute intracranial hemorrhage, mass effect or midline shift. No abnormal extra-axial fluid collection. The gray-white differentiation is maintained without evidence of an acute infarct. There is no evidence of hydrocephalus. Patchy hypodensities in the periventricular and subcortical white matter, which are nonspecific, but may represent chronic small vessel ischemic change. ORBITS: The visualized portion of the orbits demonstrate no acute abnormality. SINUSES: The visualized paranasal sinuses and mastoid air cells demonstrate no acute abnormality. SOFT TISSUES/SKULL:  No acute abnormality of the visualized skull or soft tissues. No acute intracranial abnormality. CT CERVICAL SPINE WO CONTRAST    Result Date: 12/27/2021  EXAMINATION: CT OF THE CERVICAL SPINE WITHOUT CONTRAST 12/27/2021 4:49 pm TECHNIQUE: CT of the cervical spine was performed without the administration of intravenous contrast. Multiplanar reformatted images are provided for review. Dose modulation, iterative reconstruction, and/or weight based adjustment of the mA/kV was utilized to reduce the radiation dose to as low as reasonably achievable. COMPARISON: None.  HISTORY: ORDERING SYSTEM PROVIDED HISTORY: fall TECHNOLOGIST PROVIDED HISTORY: Reason for exam:->fall Decision Support Exception - unselect if not a suspected or confirmed emergency medical condition->Emergency Medical Condition (MA) Is the patient pregnant?->No Reason for Exam: Fall, Fall (fell at dialysis today, was putting her shoe on and fell, unsure if she hit her head, no LOC, denies pain anywhere). FINDINGS: BONES/ALIGNMENT: There is mild disc space narrowing throughout. There is no acute fracture or traumatic malalignment. DEGENERATIVE CHANGES: No significant degenerative changes. SOFT TISSUES: There is no prevertebral soft tissue swelling. There is a 2.5 cm hypodensity left lobe of thyroid gland inferiorly. There is a right IJ catheter in place. The lung apices are clear. Mild degenerative disc space narrowing throughout with no acute abnormality seen. 2.5 cm hypodensity left lobe of the thyroid gland. Suggest ultrasound follow-up. RECOMMENDATIONS: Unavailable     CT THORACIC SPINE WO CONTRAST    Result Date: 12/27/2021  EXAMINATION: CT OF THE THORACIC SPINE WITHOUT CONTRAST  12/27/2021 4:50 pm: TECHNIQUE: CT of the thoracic spine was performed without the administration of intravenous contrast. Multiplanar reformatted images are provided for review. Dose modulation, iterative reconstruction, and/or weight based adjustment of the mA/kV was utilized to reduce the radiation dose to as low as reasonably achievable. COMPARISON: None. HISTORY: ORDERING SYSTEM PROVIDED HISTORY: fall TECHNOLOGIST PROVIDED HISTORY: Reason for exam:->fall Is the patient pregnant?->No Reason for Exam: Fall, Fall (fell at dialysis today, was putting her shoe on and fell, unsure if she hit her head, no LOC, denies pain anywhere). FINDINGS: BONES/ALIGNMENT: There is mild scoliotic curvature of the thoraco lumbar spine, with dextroconvexity. .  The vertebral body heights are maintained. No osseous destructive lesion is seen. DEGENERATIVE CHANGES: Anterolateral spurring is noted at multiple levels. There is no significant narrowing of the central canal. SOFT TISSUES: No paraspinal mass is seen.      No acute fracture or subluxation of the thoracic spine. Multilevel spondylosis the RECOMMENDATIONS: Unavailable     CT LUMBAR SPINE WO CONTRAST    Result Date: 12/27/2021  EXAMINATION: CT OF THE LUMBAR SPINE WITHOUT CONTRAST  12/27/2021 TECHNIQUE: CT of the lumbar spine was performed without the administration of intravenous contrast. Multiplanar reformatted images are provided for review. Adjustment of mA and/or kV according to patient size was utilized. Dose modulation, iterative reconstruction, and/or weight based adjustment of the mA/kV was utilized to reduce the radiation dose to as low as reasonably achievable. COMPARISON: None HISTORY: ORDERING SYSTEM PROVIDED HISTORY: fall TECHNOLOGIST PROVIDED HISTORY: Reason for exam:->fall Decision Support Exception - unselect if not a suspected or confirmed emergency medical condition->Emergency Medical Condition (MA) Is the patient pregnant?->No Reason for Exam: Fall, Fall (fell at dialysis today, was putting her shoe on and fell, unsure if she hit her head, no LOC, denies pain anywhere). FINDINGS: BONES/ALIGNMENT: There is normal alignment of the spine. The vertebral body heights are maintained. No osseous destructive lesion is seen. DEGENERATIVE CHANGES: Mild facet arthropathy. Anterior marginal endplate spurs most pronounced at L1-L2. Otherwise minimal disc space disease identified. SOFT TISSUES/RETROPERITONEUM: No paraspinal mass is seen. There is a borderline retroperitoneal lymph nodes up to 9 mm short axis dimension. No evidence acute fracture traumatic malalignment of the lumbar spine. Borderline/upper limits normal lymph nodes within the retroperitoneum 9 mm short axis dimension. Please correlate with history and laboratory testing.  RECOMMENDATIONS: Unavailable     XR CHEST PORTABLE    Result Date: 1/4/2022  EXAMINATION: ONE XRAY VIEW OF THE CHEST 1/4/2022 12:25 pm COMPARISON: 12/27/2021 HISTORY: ORDERING SYSTEM PROVIDED HISTORY: Frequent falls TECHNOLOGIST PROVIDED HISTORY: Reason for exam:->Frequent falls Reason for Exam: Fall (Pt reports frequent falls FINDINGS: Right-sided central venous catheter remains in place. The lungs are without acute focal process. There is no effusion or pneumothorax. The cardiomediastinal silhouette is stable. The osseous structures are stable. No acute process. XR CHEST PORTABLE    Result Date: 12/27/2021  EXAMINATION: ONE X-RAY VIEW OF THE CHEST 12/27/2021 3:41 pm COMPARISON: November 2, 2021 HISTORY: ORDERING SYSTEM PROVIDED HISTORY:  Falls TECHNOLOGIST PROVIDED HISTORY: Reason for Exam:  Falls Reason for Exam:  Falls FINDINGS: The cardiomediastinal silhouette is enlarged but stable. Right-sided central venous catheter extends to the right atrium. Lung volumes are low. No evidence of acute pulmonary edema or acute infiltrate. No pleural effusion or pneumothorax. Stable cardiomegaly. No acute cardiopulmonary process. XR HIP 2-3 VW W PELVIS RIGHT    Result Date: 1/4/2022  EXAMINATION: ONE XRAY VIEW OF THE PELVIS AND TWO XRAY VIEWS RIGHT HIP 1/4/2022 2:07 pm COMPARISON: None. HISTORY: ORDERING SYSTEM PROVIDED HISTORY:  Fall TECHNOLOGIST PROVIDED HISTORY: Reason For Exam:   Fall FINDINGS: Frontal view of the pelvis and dedicated imaging of the right hip demonstrate no acute fracture or dislocation. Normal bony mineralization. No significant bilateral hip osteoarthritis. SI joints and sacral arcuate lines appear intact. Evaluation is mildly limited by overlying bowel gas and stool. No soft tissue abnormality. 1. No acute osseous abnormality of the pelvis and right hip. EKG:   EKG from ER, reviewed by self - it shows sinus rhythm at 83, no acute st elevation  Old chart reviewed, EKG dated 12/27/21 is reviewed, there is not difference noted.     Old study shows sinus at 80    Lab Results   Component Value Date    GLUCOSE 165 01/04/2022     Lab Results   Component Value Date    POCGLU 104 12/29/2021     BP (!) 154/85 Pulse 77   Temp 98 °F (36.7 °C)   Resp 16   Wt 187 lb (84.8 kg)   LMP 12/27/2021   SpO2 (!) 86%   BMI 29.29 kg/m²     MEDICAL DECISION MAKING:    Principal Problem:    ESRD (end stage renal disease) ([facility] - Established problem. Uncontrolled. Pt again with missed HD  Plan: admit, run on HD. Consult renal  Active Problems:    Mixed hyperlipidemia -Established problem. Stable. Plan: cont on statin    Anemia -Established problem. Stable. Plan: No indication for transfusion. Cont to monitor h/h to assess progression of anemia. Recommend ferrous sulfate or MVI as outpatient. HTN (hypertension), benign -Established problem. Uncontrolled. [id number]  Plan: Pt home BP meds reviewed and will be continued. IV Hydralazine ordered for control of extremely high blood pressures. Will monitor labs to assess Creat/K for possible complications of medications. DM (diabetes mellitus), secondary, uncontrolled, w/neurologic complic ([facility] -Established problem. Stable. Plan: Patient placed on controlled carbohydrate diet. Fingerstick sugars to be checked to monitor for both hypoglycemia as well as hyperglycemia. Sliding scale insulin ordered. Glucagon and dextrose ordered for hypoglycemia. Patient will be continued on home medications. Hemoglobin a1c to be ordered to assess efficacy of therapy. Polyneuropathy due to type 2 diabetes mellitus ([facility]            Diagnoses as listed above, designated as new or established and plan outlined for each. Data Reviewed:   (1) Lab tests were reviewed or ordered. (1) Radiology tests were reviewed or ordered. (1) Medical test (Echo, EKG, PFT/ashley) were ordered. (1)History was not obtained from someone other than patient  (1) Old records were reviewed - see HPI/MDM for pertinent details if review done. (2) Case was discussed with another health care provider: Janeth PRICE  (2) Imaging was viewed by myself. (2) EKG  was viewed by myself.        The patient is being placed in inpatient status with the expectation of requiring a hospital stay spanning at least two midnights for care and treatment of the problems noted in the problem list.  This determination is also based on thepatients comorbidities and past medical history, the severity and timing of the signs and symptoms upon presentation.     (Please note that portions of this note were completed with a voice recognition program.  Efforts were made to edit the dictations but occasionally words are mis-transcribed.)      Electronically signed by: Annemarie Nash MD 1/4/2022

## 2022-01-04 NOTE — ED PROVIDER NOTES
[facility]        Pt Name: Douglas Mckinley  MRN: [id number]  [place] 1975  Date of evaluation: 1/4/2022  Provider: Papo Cobb PA-C  PCP: Gauri Kurtz  Note Started: 12:24 PM EST        I have seen and evaluated this patient with my supervising physician Sary Rg MD.    [address] [place] [address]       Chief Complaint   Patient presents with    Fall     Pt reports frequent falls        HISTORY OF PRESENT ILLNESS   (Location, Timing/Onset, Context/Setting, Quality, Duration, Modifying Factors, Severity, Associated Signs and Symptoms)  Note limiting factors. Chief Complaint: Frequent falls    Douglas Mckinley is a 55 y.o. female who presents with the above complaint frequent falls. The patient states has fall issues. However over the past several days she has had increasing falls with subsequent bruising. She does live in a condo/apartment with her . She does indicate that he has indicated to her that it is becoming more difficult to care for her. 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 She has been admitted this facility for general weakness on November 2, 2021 and December 27, 2021. Patient diagnosis upon admission and discharge was hyperkalemia, general weakness, ESRD on dialysis. Patient is followed by Dr. Aleida Rhodes nephrology. The patient is dialyzed Monday, Wednesday and Friday. Port in the right chest.  Dialyzed 3-3.5 hours. Patient last dialyzed last Wednesday or about 6 days ago. She missed her Friday and Monday dialysis appointment. This may be reason for her increased falling over the past few days. No gastrointestinal, cardiorespiratory complaints. Nursing Notes were all reviewed and agreed with or any disagreements were addressed in the HPI.     REVIEW OF SYSTEMS    (2-9 systems for level 4, 10 or more for level 5)     Review of Take 1 tablet by mouth daily    ATORVASTATIN (LIPITOR) 40 MG TABLET    Take 1 tablet by mouth nightly    BENZONATATE (TESSALON) 200 MG CAPSULE    Take 1 capsule by mouth every 8 hours as needed for Cough    BUPROPION (WELLBUTRIN XL) 150 MG EXTENDED RELEASE TABLET    Take 1 tablet by mouth every morning    CLONIDINE (CATAPRES) 0.2 MG/24HR PTWK    Place 1 patch onto the skin once a week    DULAGLUTIDE 1.5 MG/0.5ML SOPN    Inject 1.5 mg into the skin once a week    FLUVOXAMINE (LUVOX) 100 MG TABLET    Take 1 tablet by mouth nightly    FUROSEMIDE (LASIX) 40 MG TABLET        GLUCOSE (GLUTOSE) 40 % GEL    Take 37.5 mLs by mouth as needed (low blood sugar)    GLYCOPYRROLATE-FORMOTEROL (BEVESPI AEROSPHERE) 9-4.8 MCG/ACT AERO    Inhale 2 puffs into the lungs 2 times daily    HEAT WRAPS (THERMACARE BACK/HIP) MISC    1 each by Does not apply route daily as needed (back pain)    HYDROXYZINE (ATARAX) 25 MG TABLET        INSULIN GLARGINE (LANTUS SOLOSTAR) 100 UNIT/ML INJECTION PEN    Inject 20 Units into the skin every morning     INSULIN LISPRO, 1 UNIT DIAL, 100 UNIT/ML SOPN    Inject 0-6 Units into the skin 3 times daily (with meals) **Corrective Low Dose Algorithm**  Glucose: Dose:               No Insulin  140-199 1 Unit  200-249 2 Units  250-299 3 Units  300-349 4 Units  350-399 5 Units  Over 399 6 Units    INSULIN PEN NEEDLE 29G X 12.7MM MISC    1 each by Does not apply route daily    LISINOPRIL (PRINIVIL;ZESTRIL) 40 MG TABLET    Take 40 mg by mouth daily    METHYL SALICYLATE-MENTHOL (RANDI LEBLANC GREASELESS) 10-15 % CREA    Apply topically 3 times daily as needed for Pain    MISC.  DEVICES (PULSE OXIMETER) MISC    1 each by Does not apply route every 6-8 hours as needed (shortness of breath)    NASAL DILATORS (BREATHE RIGHT EXTRA STRENGTH) STRP    1 strip by Does not apply route nightly as needed (nasal congestion)    PREGABALIN (LYRICA) 75 MG CAPSULE    TAKE ONE CAPSULE BY MOUTH EVERY NIGHT    PROPRANOLOL (INDERAL LA) 80 MG EXTENDED RELEASE CAPSULE    TAKE ONE CAPSULE BY MOUTH EVERY MORNING    SPIRONOLACTONE (ALDACTONE) 50 MG TABLET    TAKE ONE TABLET BY MOUTH DAILY         ALLERGIES     Amoxicillin, Levofloxacin, Vancomycin, and Tape [adhesive tape]    FAMILYHISTORY       Family History   Problem Relation Age of Onset    Diabetes Mother    [facility] Other Mother 79        Covid    Diabetes Father     High Blood Pressure Father     Colon Cancer Father     Diabetes Sister     Alcohol Abuse Maternal Grandfather     Diabetes Paternal Grandmother     Alcohol Abuse Paternal Grandfather     Diabetes Paternal Aunt     Diabetes Paternal Uncle           SOCIAL HISTORY       Social History     Tobacco Use    Smoking status: Former Smoker     Packs/day: 1.00     Types: Cigarettes    Smokeless tobacco: Never Used    Tobacco comment: Quit in August 2021   Vaping Use    Vaping Use: Never used   Substance Use Topics    Alcohol use: No     Comment: Very Rare    Drug use: No       SCREENINGS             PHYSICAL EXAM    (up to 7 for level 4, 8 or more for level 5)     ED Triage Vitals [01/04/22 1152]   BP Temp Temp src Pulse Resp SpO2 Height Weight   134/71 98 °F (36.7 °C) -- 85 17 97 % -- 187 lb (84.8 kg)       Physical Exam  Vitals and nursing note reviewed. Constitutional:       Appearance: Normal appearance. She is well-developed and normal weight. Comments: Patient sitting in wheelchair and dozing as I enter room Price 5. HENT:      Head: Normocephalic and atraumatic. Right Ear: External ear normal.      Left Ear: External ear normal.   Eyes:      General: No scleral icterus. Right eye: No discharge. Left eye: No discharge. Extraocular Movements: Extraocular movements intact. Conjunctiva/sclera: Conjunctivae normal.      Pupils: Pupils are equal, round, and reactive to light. Cardiovascular:      Rate and Rhythm: Normal rate and regular rhythm. Heart sounds: Normal heart sounds.    Pulmonary: Effort: Pulmonary effort is normal.      Breath sounds: Normal breath sounds. Abdominal:      General: Abdomen is flat. Bowel sounds are normal.      Palpations: Abdomen is soft. Musculoskeletal:         General: Normal range of motion. Cervical back: Normal range of motion and neck supple. Right lower leg: Edema present. Left lower leg: Edema present. Comments: Trace bilateral lower extremity edema. Skin:     General: Skin is warm and dry. Neurological:      General: No focal deficit present. Mental Status: She is alert and oriented to person, place, and time. Mental status is at baseline. Comments: Patient is slow to respond to questions and searching for thought and I do believe this to be residual from her CVA x2 August, 2021. Psychiatric:         Mood and Affect: Mood normal.         Behavior: Behavior normal.         Thought Content:  Thought content normal.         Judgment: Judgment normal.         DIAGNOSTIC RESULTS   LABS:    Labs Reviewed   CBC WITH AUTO DIFFERENTIAL - Abnormal; Notable for the following components:       Result Value    WBC 13.6 (*)     Hemoglobin 11.6 (*)     RDW 17.0 (*)     Neutrophils Absolute 10.8 (*)     All other components within normal limits    Narrative:     Performed at:  [facility] [address], [place], [address]   Phone [phone]   BASIC METABOLIC PANEL W/ REFLEX TO MG FOR LOW K - Abnormal; Notable for the following components:    Sodium 132 (*)     Chloride 94 (*)     CO2 19 (*)     Anion Gap 19 (*)     Glucose 165 (*)     BUN 58 (*)     CREATININE 8.4 (*)     GFR Non- 5 (*)     GFR  6 (*)     All other components within normal limits    Narrative:     Scott Blanco  [facility] tel. [phone],  Chemistry results called to and read back by Lucy LEACH, 01/04/2022  13:50, by [facility]  Performed at:  [facility]  [place] [address]   [address] [place] [address] [employer]   Phone [phone]   HEPATIC FUNCTION PANEL - Abnormal; Notable for the following components:    Alkaline Phosphatase 173 (*)     All other components within normal limits    Narrative:     Jaqueline VALENTIN tel. [phone],  Chemistry results called to and read back by Jhonatan LEACH, 01/04/2022  13:50, by [facility]  Performed at:  [facility] [address], [place], [facility] [employer]   Phone [phone]   TROPONIN - Abnormal; Notable for the following components:    Troponin 0.27 (*)     All other components within normal limits    Narrative:     Jaqueline VALENTIN tel. [phone],  Chemistry results called to and read back by Jhonatan LEACH, 01/04/2022  13:50, by [facility]  Performed at:  [facility] [address],  [place], [address]   Phone 21  - Abnormal; Notable for the following components:    Pro-BNP 13,362 (*)     All other components within normal limits    Narrative:     Jaqueline CAMPOVERDE tel. [phone],  Chemistry results called to and read back by Jhonatan LEACH, 01/04/2022  13:50, by [facility]  Performed at:  [facility] [address], [place], [facility] [employer]   Phone [phone]   URINE RT REFLEX TO CULTURE - Abnormal; Notable for the following components:    Glucose, Ur 250 (*)     Blood, Urine MODERATE (*)     All other components within normal limits    Narrative:     Performed at:  [facility] [address], [place], [facility] [employer]   Phone [phone]   IRON AND TIBC - Abnormal; Notable for the following components:    Iron 36 (*)     Iron Saturation 12 (*)     All other components within normal limits    Narrative:     Performed at:  [address] [address]   Phone [phone]   MICROSCOPIC URINALYSIS - Abnormal; Notable for Head WO Contrast   Final Result   No acute intracranial abnormality. RECOMMENDATIONS:   Unavailable         XR CHEST PORTABLE   Final Result   No acute process. No results found. PROCEDURES   Unless otherwise noted below, none     Procedures    CRITICAL CARE TIME   Critical Care  There was a high probability of life-threatening clinical deterioration in the patient's condition requiring my urgent intervention. Total critical care time with the patient was 45 minutes excluding separately reportable procedures. Critical care required due to patients finding of progressive generalized weakness, fatigue, increasing fall frequency, general debility and ESRD requiring hemodialysis. Time spent discussing case with attending physician, PCP and nephrology. CONSULTS:  IP CONSULT TO NEPHROLOGY  IP CONSULT TO INTERNAL MEDICINE  IP CONSULT TO NEPHROLOGY      EMERGENCY DEPARTMENT COURSE and DIFFERENTIAL DIAGNOSIS/MDM:   Vitals:    Vitals:    01/04/22 1930 01/04/22 1945 01/04/22 2000 01/04/22 2015   BP: (!) 169/80 (!) 155/80 (!) 172/141 (!) 159/76   Pulse: 77 77 78 78   Resp: 17 16 16 16   Temp:       SpO2:   99% 100%   Weight:           Patient was given the following medications:  Medications - No data to display      Patient resenting with increased fall frequency over the past several days. Patient admitted for general weakness, frequent falls and debility late November as well as more recently December 27. Patient is ESRD on dialysis. Last dialysis about a week ago. Dialyzed Monday, Wednesday and Friday 3-3.5 hours. Today the patient BUN 58, creatinine 8.4 and GFR 5. Potassium 4.3. Troponin baseline 0.27.  proBNP slightly elevated at 13,362 with comparison to her admission on December 27, 2021 at which time it was 2333. Imaging of the patient's right hip and pelvis negative. CT head negative. Chest x-ray negative.     I will consult nephrology for dialysis as well has admission for further treatment of her increased fall frequency. At 3:30 PM I spoke with nephrology. Patient be dialyzed in the morning. He advised PCP Dr. Bernarda Srinivasan to admit. At 4 PM I spoke with Dr. Wisam Grider and the patient be admitted for further evaluation and treatment. FINAL IMPRESSION      1. Frequent falls    2. General weakness    3. Debility    4. ESRD on hemodialysis [facility])          DISPOSITION/PLAN   DISPOSITION Admitted 01/04/2022 04:16:17 PM      PATIENT REFERRED TO:  No follow-up provider specified. DISCHARGE MEDICATIONS:  New Prescriptions    No medications on file       DISCONTINUED MEDICATIONS:  Discontinued Medications    No medications on file              (Please note that portions of this note were completed with a voice recognition program.  Efforts were made to edit the dictations but occasionally words are mis-transcribed. )    Moises Leyva PA-C (electronically signed)           Moises Leyva PA-C  01/04/22 2031

## 2022-01-04 NOTE — ED NOTES
Bed: 07  Expected date:   Expected time:   Means of arrival:   Comments:  Keely Miller, HAYLEE  01/04/22 8282

## 2022-01-05 LAB
A/G RATIO: 0.8 (ref 1.1–2.2)
ALBUMIN SERPL-MCNC: 3 G/DL (ref 3.4–5)
ALP BLD-CCNC: 152 U/L (ref 40–129)
ALT SERPL-CCNC: 15 U/L (ref 10–40)
ANION GAP SERPL CALCULATED.3IONS-SCNC: 18 MMOL/L (ref 3–16)
AST SERPL-CCNC: 29 U/L (ref 15–37)
BASOPHILS ABSOLUTE: 0 K/UL (ref 0–0.2)
BASOPHILS RELATIVE PERCENT: 0.2 %
BILIRUB SERPL-MCNC: 0.4 MG/DL (ref 0–1)
BUN BLDV-MCNC: 58 MG/DL (ref 7–20)
C DIFF TOXIN/ANTIGEN: NORMAL
CALCIUM SERPL-MCNC: 8.5 MG/DL (ref 8.3–10.6)
CHLORIDE BLD-SCNC: 96 MMOL/L (ref 99–110)
CO2: 18 MMOL/L (ref 21–32)
CREAT SERPL-MCNC: 8.8 MG/DL (ref 0.6–1.1)
EOSINOPHILS ABSOLUTE: 0.3 K/UL (ref 0–0.6)
EOSINOPHILS RELATIVE PERCENT: 1.7 %
ESTIMATED AVERAGE GLUCOSE: 142.7 MG/DL
GFR AFRICAN AMERICAN: 6
GFR NON-AFRICAN AMERICAN: 5
GLUCOSE BLD-MCNC: 138 MG/DL (ref 70–99)
GLUCOSE BLD-MCNC: 144 MG/DL (ref 70–99)
GLUCOSE BLD-MCNC: 148 MG/DL (ref 70–99)
GLUCOSE BLD-MCNC: 177 MG/DL (ref 70–99)
GLUCOSE BLD-MCNC: 96 MG/DL (ref 70–99)
HBA1C MFR BLD: 6.6 %
HBV SURFACE AB TITR SER: 16.25 MIU/ML
HCT VFR BLD CALC: 33 % (ref 36–48)
HEMOGLOBIN: 10.4 G/DL (ref 12–16)
HEPATITIS B SURFACE ANTIGEN INTERPRETATION: NORMAL
LYMPHOCYTES ABSOLUTE: 1.6 K/UL (ref 1–5.1)
LYMPHOCYTES RELATIVE PERCENT: 10.6 %
MCH RBC QN AUTO: 25.9 PG (ref 26–34)
MCHC RBC AUTO-ENTMCNC: 31.5 G/DL (ref 31–36)
MCV RBC AUTO: 82.1 FL (ref 80–100)
MONOCYTES ABSOLUTE: 1.4 K/UL (ref 0–1.3)
MONOCYTES RELATIVE PERCENT: 9.2 %
NEUTROPHILS ABSOLUTE: 11.9 K/UL (ref 1.7–7.7)
NEUTROPHILS RELATIVE PERCENT: 78.3 %
PDW BLD-RTO: 16.6 % (ref 12.4–15.4)
PERFORMED ON: ABNORMAL
PERFORMED ON: NORMAL
PLATELET # BLD: ABNORMAL K/UL (ref 135–450)
PLATELET SLIDE REVIEW: ABNORMAL
PMV BLD AUTO: ABNORMAL FL (ref 5–10.5)
POTASSIUM REFLEX MAGNESIUM: 4.5 MMOL/L (ref 3.5–5.1)
RBC # BLD: 4.02 M/UL (ref 4–5.2)
SLIDE REVIEW: ABNORMAL
SODIUM BLD-SCNC: 132 MMOL/L (ref 136–145)
TOTAL PROTEIN: 6.6 G/DL (ref 6.4–8.2)
WBC # BLD: 15.1 K/UL (ref 4–11)

## 2022-01-05 PROCEDURE — 80053 COMPREHEN METABOLIC PANEL: CPT

## 2022-01-05 PROCEDURE — 87324 CLOSTRIDIUM AG IA: CPT

## 2022-01-05 PROCEDURE — 85025 COMPLETE CBC W/AUTO DIFF WBC: CPT

## 2022-01-05 PROCEDURE — 97166 OT EVAL MOD COMPLEX 45 MIN: CPT

## 2022-01-05 PROCEDURE — 6360000002 HC RX W HCPCS: Performed by: INTERNAL MEDICINE

## 2022-01-05 PROCEDURE — 5A1D70Z PERFORMANCE OF URINARY FILTRATION, INTERMITTENT, LESS THAN 6 HOURS PER DAY: ICD-10-PCS | Performed by: INTERNAL MEDICINE

## 2022-01-05 PROCEDURE — 97530 THERAPEUTIC ACTIVITIES: CPT

## 2022-01-05 PROCEDURE — 87493 C DIFF AMPLIFIED PROBE: CPT

## 2022-01-05 PROCEDURE — 87449 NOS EACH ORGANISM AG IA: CPT

## 2022-01-05 PROCEDURE — 97162 PT EVAL MOD COMPLEX 30 MIN: CPT

## 2022-01-05 PROCEDURE — 83036 HEMOGLOBIN GLYCOSYLATED A1C: CPT

## 2022-01-05 PROCEDURE — 36415 COLL VENOUS BLD VENIPUNCTURE: CPT

## 2022-01-05 PROCEDURE — 6370000000 HC RX 637 (ALT 250 FOR IP): Performed by: INTERNAL MEDICINE

## 2022-01-05 PROCEDURE — 97535 SELF CARE MNGMENT TRAINING: CPT

## 2022-01-05 PROCEDURE — 86706 HEP B SURFACE ANTIBODY: CPT

## 2022-01-05 PROCEDURE — 2580000003 HC RX 258: Performed by: INTERNAL MEDICINE

## 2022-01-05 PROCEDURE — 1200000000 HC SEMI PRIVATE

## 2022-01-05 PROCEDURE — 90935 HEMODIALYSIS ONE EVALUATION: CPT

## 2022-01-05 PROCEDURE — 86704 HEP B CORE ANTIBODY TOTAL: CPT

## 2022-01-05 PROCEDURE — 87340 HEPATITIS B SURFACE AG IA: CPT

## 2022-01-05 RX ORDER — SODIUM CHLORIDE 9 MG/ML
25 INJECTION, SOLUTION INTRAVENOUS PRN
Status: DISCONTINUED | OUTPATIENT
Start: 2022-01-05 | End: 2022-01-06 | Stop reason: HOSPADM

## 2022-01-05 RX ORDER — DEXTROSE MONOHYDRATE 25 G/50ML
12.5 INJECTION, SOLUTION INTRAVENOUS PRN
Status: DISCONTINUED | OUTPATIENT
Start: 2022-01-05 | End: 2022-01-06 | Stop reason: HOSPADM

## 2022-01-05 RX ORDER — HEPARIN SODIUM 5000 [USP'U]/ML
5000 INJECTION, SOLUTION INTRAVENOUS; SUBCUTANEOUS EVERY 8 HOURS SCHEDULED
Status: DISCONTINUED | OUTPATIENT
Start: 2022-01-05 | End: 2022-01-06 | Stop reason: HOSPADM

## 2022-01-05 RX ORDER — PREGABALIN 75 MG/1
75 CAPSULE ORAL NIGHTLY
Status: DISCONTINUED | OUTPATIENT
Start: 2022-01-05 | End: 2022-01-06 | Stop reason: HOSPADM

## 2022-01-05 RX ORDER — NICOTINE POLACRILEX 4 MG
15 LOZENGE BUCCAL PRN
Status: DISCONTINUED | OUTPATIENT
Start: 2022-01-05 | End: 2022-01-06 | Stop reason: HOSPADM

## 2022-01-05 RX ORDER — ASPIRIN 81 MG/1
81 TABLET ORAL DAILY
Status: DISCONTINUED | OUTPATIENT
Start: 2022-01-05 | End: 2022-01-06 | Stop reason: HOSPADM

## 2022-01-05 RX ORDER — ATORVASTATIN CALCIUM 40 MG/1
40 TABLET, FILM COATED ORAL NIGHTLY
Status: DISCONTINUED | OUTPATIENT
Start: 2022-01-05 | End: 2022-01-06 | Stop reason: HOSPADM

## 2022-01-05 RX ORDER — INSULIN LISPRO 100 [IU]/ML
0-6 INJECTION, SOLUTION INTRAVENOUS; SUBCUTANEOUS
Status: DISCONTINUED | OUTPATIENT
Start: 2022-01-05 | End: 2022-01-05 | Stop reason: ALTCHOICE

## 2022-01-05 RX ORDER — BUPROPION HYDROCHLORIDE 150 MG/1
150 TABLET ORAL EVERY MORNING
Status: DISCONTINUED | OUTPATIENT
Start: 2022-01-05 | End: 2022-01-06 | Stop reason: HOSPADM

## 2022-01-05 RX ORDER — FLUVOXAMINE MALEATE 50 MG/1
100 TABLET, COATED ORAL NIGHTLY
Status: DISCONTINUED | OUTPATIENT
Start: 2022-01-05 | End: 2022-01-05 | Stop reason: ALTCHOICE

## 2022-01-05 RX ORDER — ONDANSETRON 2 MG/ML
4 INJECTION INTRAMUSCULAR; INTRAVENOUS EVERY 6 HOURS PRN
Status: DISCONTINUED | OUTPATIENT
Start: 2022-01-05 | End: 2022-01-06 | Stop reason: HOSPADM

## 2022-01-05 RX ORDER — SODIUM CHLORIDE 0.9 % (FLUSH) 0.9 %
10 SYRINGE (ML) INJECTION PRN
Status: DISCONTINUED | OUTPATIENT
Start: 2022-01-05 | End: 2022-01-06 | Stop reason: HOSPADM

## 2022-01-05 RX ORDER — ALBUTEROL SULFATE 90 UG/1
2 AEROSOL, METERED RESPIRATORY (INHALATION) EVERY 6 HOURS PRN
Status: DISCONTINUED | OUTPATIENT
Start: 2022-01-05 | End: 2022-01-05 | Stop reason: SDUPTHER

## 2022-01-05 RX ORDER — FUROSEMIDE 20 MG/1
10 TABLET ORAL DAILY
Status: DISCONTINUED | OUTPATIENT
Start: 2022-01-05 | End: 2022-01-06 | Stop reason: HOSPADM

## 2022-01-05 RX ORDER — INSULIN LISPRO 100 [IU]/ML
0-6 INJECTION, SOLUTION INTRAVENOUS; SUBCUTANEOUS NIGHTLY
Status: DISCONTINUED | OUTPATIENT
Start: 2022-01-05 | End: 2022-01-06 | Stop reason: HOSPADM

## 2022-01-05 RX ORDER — POTASSIUM CHLORIDE 7.45 MG/ML
10 INJECTION INTRAVENOUS PRN
Status: DISCONTINUED | OUTPATIENT
Start: 2022-01-05 | End: 2022-01-05

## 2022-01-05 RX ORDER — HYDROXYZINE HYDROCHLORIDE 25 MG/1
25 TABLET, FILM COATED ORAL 4 TIMES DAILY PRN
Status: DISCONTINUED | OUTPATIENT
Start: 2022-01-05 | End: 2022-01-06 | Stop reason: HOSPADM

## 2022-01-05 RX ORDER — BENZONATATE 100 MG/1
200 CAPSULE ORAL EVERY 8 HOURS PRN
Status: DISCONTINUED | OUTPATIENT
Start: 2022-01-05 | End: 2022-01-06 | Stop reason: HOSPADM

## 2022-01-05 RX ORDER — ACETAMINOPHEN 325 MG/1
650 TABLET ORAL EVERY 6 HOURS PRN
Status: DISCONTINUED | OUTPATIENT
Start: 2022-01-05 | End: 2022-01-06 | Stop reason: HOSPADM

## 2022-01-05 RX ORDER — ONDANSETRON 4 MG/1
4 TABLET, ORALLY DISINTEGRATING ORAL EVERY 8 HOURS PRN
Status: DISCONTINUED | OUTPATIENT
Start: 2022-01-05 | End: 2022-01-06 | Stop reason: HOSPADM

## 2022-01-05 RX ORDER — HYDRALAZINE HYDROCHLORIDE 20 MG/ML
10 INJECTION INTRAMUSCULAR; INTRAVENOUS EVERY 6 HOURS PRN
Status: DISCONTINUED | OUTPATIENT
Start: 2022-01-05 | End: 2022-01-06 | Stop reason: HOSPADM

## 2022-01-05 RX ORDER — INSULIN LISPRO 100 [IU]/ML
0-12 INJECTION, SOLUTION INTRAVENOUS; SUBCUTANEOUS
Status: DISCONTINUED | OUTPATIENT
Start: 2022-01-05 | End: 2022-01-06 | Stop reason: HOSPADM

## 2022-01-05 RX ORDER — CLONIDINE 0.2 MG/24H
1 PATCH, EXTENDED RELEASE TRANSDERMAL WEEKLY
Status: DISCONTINUED | OUTPATIENT
Start: 2022-01-05 | End: 2022-01-06 | Stop reason: HOSPADM

## 2022-01-05 RX ORDER — ALBUTEROL SULFATE 2.5 MG/.5ML
2.5 SOLUTION RESPIRATORY (INHALATION) EVERY 6 HOURS PRN
Status: DISCONTINUED | OUTPATIENT
Start: 2022-01-05 | End: 2022-01-05 | Stop reason: SDUPTHER

## 2022-01-05 RX ORDER — PROPRANOLOL HYDROCHLORIDE 80 MG/1
80 CAPSULE, EXTENDED RELEASE ORAL DAILY
Status: DISCONTINUED | OUTPATIENT
Start: 2022-01-05 | End: 2022-01-06 | Stop reason: HOSPADM

## 2022-01-05 RX ORDER — NICOTINE POLACRILEX 4 MG
15 LOZENGE BUCCAL PRN
Status: DISCONTINUED | OUTPATIENT
Start: 2022-01-05 | End: 2022-01-05 | Stop reason: SDUPTHER

## 2022-01-05 RX ORDER — AMLODIPINE BESYLATE 5 MG/1
10 TABLET ORAL DAILY
Status: DISCONTINUED | OUTPATIENT
Start: 2022-01-05 | End: 2022-01-06 | Stop reason: HOSPADM

## 2022-01-05 RX ORDER — LISINOPRIL 20 MG/1
40 TABLET ORAL DAILY
Status: DISCONTINUED | OUTPATIENT
Start: 2022-01-05 | End: 2022-01-06 | Stop reason: HOSPADM

## 2022-01-05 RX ORDER — ALPRAZOLAM 3 MG/1
3 TABLET, EXTENDED RELEASE ORAL EVERY MORNING
Qty: 3 TABLET | Refills: 0 | Status: ON HOLD | OUTPATIENT
Start: 2022-01-05 | End: 2022-02-23

## 2022-01-05 RX ORDER — POTASSIUM CHLORIDE 20 MEQ/1
40 TABLET, EXTENDED RELEASE ORAL PRN
Status: DISCONTINUED | OUTPATIENT
Start: 2022-01-05 | End: 2022-01-05

## 2022-01-05 RX ORDER — SODIUM CHLORIDE 0.9 % (FLUSH) 0.9 %
10 SYRINGE (ML) INJECTION EVERY 12 HOURS SCHEDULED
Status: DISCONTINUED | OUTPATIENT
Start: 2022-01-05 | End: 2022-01-06 | Stop reason: HOSPADM

## 2022-01-05 RX ORDER — ALPRAZOLAM 0.5 MG/1
0.75 TABLET ORAL EVERY 6 HOURS SCHEDULED
Status: DISCONTINUED | OUTPATIENT
Start: 2022-01-05 | End: 2022-01-06 | Stop reason: HOSPADM

## 2022-01-05 RX ORDER — ACETAMINOPHEN 650 MG/1
650 SUPPOSITORY RECTAL EVERY 6 HOURS PRN
Status: DISCONTINUED | OUTPATIENT
Start: 2022-01-05 | End: 2022-01-06 | Stop reason: HOSPADM

## 2022-01-05 RX ORDER — DEXTROSE MONOHYDRATE 50 MG/ML
100 INJECTION, SOLUTION INTRAVENOUS PRN
Status: DISCONTINUED | OUTPATIENT
Start: 2022-01-05 | End: 2022-01-06 | Stop reason: HOSPADM

## 2022-01-05 RX ORDER — ALBUTEROL SULFATE 90 UG/1
2 AEROSOL, METERED RESPIRATORY (INHALATION) EVERY 4 HOURS PRN
Status: DISCONTINUED | OUTPATIENT
Start: 2022-01-05 | End: 2022-01-06 | Stop reason: HOSPADM

## 2022-01-05 RX ORDER — ALPRAZOLAM 3 MG/1
3 TABLET, EXTENDED RELEASE ORAL DAILY
Status: DISCONTINUED | OUTPATIENT
Start: 2022-01-05 | End: 2022-01-05 | Stop reason: CLARIF

## 2022-01-05 RX ORDER — HEPARIN SODIUM 1000 [USP'U]/ML
3200 INJECTION, SOLUTION INTRAVENOUS; SUBCUTANEOUS PRN
Status: DISCONTINUED | OUTPATIENT
Start: 2022-01-05 | End: 2022-01-06 | Stop reason: HOSPADM

## 2022-01-05 RX ORDER — SPIRONOLACTONE 25 MG/1
50 TABLET ORAL DAILY
Status: DISCONTINUED | OUTPATIENT
Start: 2022-01-05 | End: 2022-01-06 | Stop reason: HOSPADM

## 2022-01-05 RX ORDER — 0.9 % SODIUM CHLORIDE 0.9 %
500 INTRAVENOUS SOLUTION INTRAVENOUS PRN
Status: DISCONTINUED | OUTPATIENT
Start: 2022-01-05 | End: 2022-01-06 | Stop reason: HOSPADM

## 2022-01-05 RX ADMIN — HEPARIN SODIUM 5000 UNITS: 5000 INJECTION INTRAVENOUS; SUBCUTANEOUS at 20:11

## 2022-01-05 RX ADMIN — AMLODIPINE BESYLATE 10 MG: 5 TABLET ORAL at 13:43

## 2022-01-05 RX ADMIN — PREGABALIN 75 MG: 75 CAPSULE ORAL at 20:06

## 2022-01-05 RX ADMIN — INSULIN LISPRO 1 UNITS: 100 INJECTION, SOLUTION INTRAVENOUS; SUBCUTANEOUS at 22:00

## 2022-01-05 RX ADMIN — HEPARIN SODIUM 5000 UNITS: 5000 INJECTION INTRAVENOUS; SUBCUTANEOUS at 06:34

## 2022-01-05 RX ADMIN — HEPARIN SODIUM 5000 UNITS: 5000 INJECTION INTRAVENOUS; SUBCUTANEOUS at 13:55

## 2022-01-05 RX ADMIN — FUROSEMIDE 10 MG: 20 TABLET ORAL at 13:44

## 2022-01-05 RX ADMIN — SPIRONOLACTONE 50 MG: 25 TABLET ORAL at 13:44

## 2022-01-05 RX ADMIN — ALPRAZOLAM 0.75 MG: 0.5 TABLET ORAL at 18:25

## 2022-01-05 RX ADMIN — ATORVASTATIN CALCIUM 40 MG: 40 TABLET, FILM COATED ORAL at 20:07

## 2022-01-05 RX ADMIN — HEPARIN SODIUM 3200 UNITS: 1000 INJECTION INTRAVENOUS; SUBCUTANEOUS at 12:16

## 2022-01-05 RX ADMIN — ALPRAZOLAM 0.75 MG: 0.5 TABLET ORAL at 02:39

## 2022-01-05 RX ADMIN — BUPROPION HYDROCHLORIDE 150 MG: 150 TABLET, EXTENDED RELEASE ORAL at 13:43

## 2022-01-05 RX ADMIN — ASPIRIN 81 MG: 81 TABLET, COATED ORAL at 13:43

## 2022-01-05 RX ADMIN — PROPRANOLOL HYDROCHLORIDE 80 MG: 80 CAPSULE, EXTENDED RELEASE ORAL at 13:44

## 2022-01-05 RX ADMIN — ALPRAZOLAM 0.75 MG: 0.5 TABLET ORAL at 13:43

## 2022-01-05 RX ADMIN — LISINOPRIL 40 MG: 20 TABLET ORAL at 13:43

## 2022-01-05 RX ADMIN — INSULIN LISPRO 2 UNITS: 100 INJECTION, SOLUTION INTRAVENOUS; SUBCUTANEOUS at 18:26

## 2022-01-05 RX ADMIN — SODIUM CHLORIDE, PRESERVATIVE FREE 10 ML: 5 INJECTION INTRAVENOUS at 13:44

## 2022-01-05 ASSESSMENT — PAIN DESCRIPTION - DESCRIPTORS
DESCRIPTORS: BURNING
DESCRIPTORS_2: SHARP;CONSTANT

## 2022-01-05 ASSESSMENT — PAIN DESCRIPTION - PAIN TYPE
TYPE: ACUTE PAIN
TYPE: ACUTE PAIN
TYPE_2: ACUTE PAIN

## 2022-01-05 ASSESSMENT — PAIN SCALES - GENERAL
PAINLEVEL_OUTOF10: 0
PAINLEVEL_OUTOF10: 7
PAINLEVEL_OUTOF10: 0
PAINLEVEL_OUTOF10: 5
PAINLEVEL_OUTOF10: 0
PAINLEVEL_OUTOF10: 0

## 2022-01-05 ASSESSMENT — PAIN DESCRIPTION - LOCATION
LOCATION: BACK;NECK
LOCATION_2: HIP

## 2022-01-05 ASSESSMENT — PAIN DESCRIPTION - ORIENTATION: ORIENTATION_2: OUTER

## 2022-01-05 ASSESSMENT — PAIN DESCRIPTION - DIRECTION: RADIATING_TOWARDS: DOWN

## 2022-01-05 ASSESSMENT — PAIN DESCRIPTION - INTENSITY: RATING_2: 7

## 2022-01-05 ASSESSMENT — PAIN DESCRIPTION - FREQUENCY: FREQUENCY: CONTINUOUS

## 2022-01-05 NOTE — DISCHARGE INSTR - COC
Continuity of Care Form    Patient Name: Shanae Rothman   :  1975  MRN:  [id number]    Admit date:  2022  Discharge date:  ***    Code Status Order: Full Code   Advance Directives:      Admitting Physician:  Arturo Crump MD  PCP: Maryanne Herrera    Discharging Nurse: [facility] Unit/Room#: [id number]  Discharging Unit Phone Number: ***    Emergency Contact:   Extended Emergency Contact Information  Primary Emergency Contact: [address] Phone: [phone]  Relation: Spouse    Past Surgical History:  Past Surgical History:   Procedure Laterality Date    CERVIX SURGERY      laser tx for dysplasia;    EYE SURGERY      FOOT SURGERY Right     FOOT SURGERY Bilateral     FOOT SURGERY Left     IR TUNNELED CATHETER PLACEMENT GREATER THAN 5 YEARS  2021    IR TUNNELED CATHETER PLACEMENT GREATER THAN 5 YEARS 2021 Irina Valencia MD [facility] SPECIAL PROCEDURES       Immunization History:   Immunization History   Administered Date(s) Administered    Influenza 2011    Influenza Virus Vaccine 2011, 10/05/2013    Pneumococcal Polysaccharide (Wmikectqv99) 2021    Tdap (Boostrix, Adacel) 2018, 2021       Active Problems:  Patient Active Problem List   Diagnosis Code    Type 2 diabetes mellitus, with long-term current use of insulin ([facility] E11.9, Z79.4    Mixed hyperlipidemia E78.2    Migraine headache G43.909    Anemia D64.9    Diabetic foot infection ([facility] E11.628, L08.9    Pyogenic inflammation of bone (HCC) M86.9    History of medication noncompliance Z91.14    Osteomyelitis of left foot ([facility]) M86.9    Nephrotic syndrome N04.9    Peripheral edema R60.9    Pulmonary edema J81.1    Right sided numbness R20.0    Tobacco dependence F17.200    Chronic kidney disease (CKD), stage III (moderate) (HCC) N18.30    Chronic diastolic (congestive) heart failure (HCC) I50.32    History of CVA (cerebrovascular accident) Z86.73    Arterial ischemic stroke, ICA, left, acute ([facility] Y06.762    HTN (hypertension), benign I10    DM (diabetes mellitus), secondary, uncontrolled, w/neurologic complic ([facility]) U52.96, E84.69    Dyslipidemia E78.5    Smoker F17.200    Panic disorder F41.0    Isolated proteinuria R80.0    Acute on chronic diastolic heart failure ([facility]) I50.33    Diabetic peripheral neuropathy ([facility]) E11.42    Acute respiratory failure ([facility]) J96.00    Depression F32. 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, associated with type 2 diabetes mellitus ([facility]) C37.5423    Cellulitis of right foot L03.115    Hidradenitis suppurativa L73.2    Hypocalcemia E83.51    Non-toxic multinodular goiter E04.2    Polyneuropathy due to type 2 diabetes mellitus ([facility] E11.42    Proliferative diabetic retinopathy associated with type 2 diabetes mellitus ([facility] E11.3599    ESRD (end stage renal disease) ([facility] N18.6    Acute respiratory failure with hypoxemia ([facility]) J96.01    Acute respiratory failure due to COVID-19 ([facility] U07.1, J96.00    Suspected COVID-19 virus infection Z20.822    Epiglottitis J05.10       Isolation/Infection:   Isolation            C Diff Contact          Patient Infection Status       Infection Onset Added Last Indicated Last Indicated By Review Planned Expiration Resolved Resolved By    None active    Resolved    C-diff Rule Out 01/05/22 01/05/22 01/05/22 Clostridium difficile toxin/antigen (Ordered)   01/05/22 Rule-Out Test Resulted    COVID-19 (Rule Out) 10/16/21 10/16/21 10/17/21 COVID-19 (Ordered)   10/17/21 Rule-Out Test Resulted    COVID-19 (Rule Out) 10/04/21 10/04/21 10/04/21 COVID-19 (Ordered)   10/05/21 Rule-Out Test Resulted    COVID-19 (Rule Out) 08/27/21 08/27/21 08/27/21 COVID-19 (Ordered)   08/28/21 Rule-Out Test Resulted    COVID-19 (Rule Out) 02/23/21 02/23/21 02/23/21 COVID-19, Rapid (Ordered)   02/23/21 Rule-Out Test Resulted    MRSA 04/22/19 04/25/19 04/22/19 Wound Culture   20 Brea Ocampo RN            Nurse Assessment:  Last Vital Signs: BP (!) 164/77   Pulse 98   Temp 98.3 °F (36.8 °C) (Axillary)   Resp 20   Ht 5' 7\" (1.702 m)   Wt 181 lb 3.5 oz (82.2 kg)   LMP 2021   SpO2 95%   BMI 28.38 kg/m²     Last documented pain score (0-10 scale): Pain Level: 7 (R knee (1/10), L knee (10), neck (/10) general body aches and pains (rates between 1-10))  Last Weight:   Wt Readings from Last 1 Encounters:   22 181 lb 3.5 oz (82.2 kg)     Mental Status:  {IP PT MENTAL STATUS:}    IV Access:  { SARAH IV ACCESS:899261208}    Nursing Mobility/ADLs:  Walking   {[facility] DME WNKU:464781661}  Transfer  {[facility] DME TBHL:881103680}  Bathing  {[facility] DME FVUM:161978364}  Dressing  {[facility] DME XEFK:486011242}  Toileting  {[facility] DME XUQA:670542752}  Feeding  {[facility] DME UTNY:307388479}  Med Admin  {[facility] DME SVUD:095489034}  Med Delivery   { SARAH MED Delivery:599380254}    Wound Care Documentation and Therapy:  Incision 10/05/13 Other (Comment) Left (Active)   Number of days: 3014        Elimination:  Continence:    Bowel: {YES / LN:94444}  Bladder: {YES / Y}  Urinary Catheter: {Urinary Catheter:501060010}   Colostomy/Ileostomy/Ileal Conduit: {YES / LL:52078}       Date of Last BM: ***    Intake/Output Summary (Last 24 hours) at 2022 1515  Last data filed at 2022 1229  Gross per 24 hour   Intake 500 ml   Output 2400 ml   Net -1900 ml     I/O last 3 completed shifts:  In: -   Out: 900 [Urine:900]    Safety Concerns:     [address] [employer] Safety Concerns:972688201}    Impairments/Disabilities:      [employer] Impairments/Disabilities:683564637}    Nutrition Therapy:  Current Nutrition Therapy:   Amparo SMITH Diet List:417506483}    Routes of Feeding: {[facility]P DME Other Feedings:936013916}  Liquids: {Slp liquid thickness:46573}  Daily Fluid Restriction: {CHP DME Yes amt example:840798467}  Last Modified Barium Swallow with Video (Video Swallowing Test): {Done Not Done PJBV:662911413}    Treatments at the Time of Hospital Discharge:   Respiratory Treatments: ***  Oxygen Therapy:  {Therapy; copd oxygen:37408}  Ventilator:    {[facility] Vent OARW:562162972}    Rehab Therapies: {THERAPEUTIC INTERVENTION:9536279570}  Weight Bearing Status/Restrictions: {[facility] Weight Bearin}  Other Medical Equipment (for information only, NOT a DME order):  {EQUIPMENT:911954217}  Other Treatments: ***    Patient's personal belongings (please select all that are sent with patient):  {CHP DME Belongings:137248742}    RN SIGNATURE:  {Esignature:251258660}    CASE MANAGEMENT/SOCIAL WORK SECTION    Inpatient Status Date: ***    Readmission Risk Assessment Score:  Readmission Risk              Risk of Unplanned Readmission:  48           Discharging to Facility/ Agency   Name:   Address:  Phone:  Fax:    Dialysis Facility (if applicable)   Name:  Address:  Dialysis Schedule:  Phone:  Fax:    / signature: {Esignature:742143095}    PHYSICIAN SECTION    Prognosis: Guarded    Condition at Discharge: Stable    Rehab Potential (if transferring to Rehab): Good    Recommended Labs or Other Treatments After Discharge: ***    Physician Certification: I certify the above information and transfer of Brock Rodriguez  is necessary for the continuing treatment of the diagnosis listed and that she requires [place] for greater 30 days.      Update Admission H&P: No change in H&P    PHYSICIAN SIGNATURE:  Electronically signed by Nadir Pillai MD on 22 at 3:15 PM EST

## 2022-01-05 NOTE — ED NOTES
PT report given to HAYLEE Thomas at this time. He states no further questions or concerns.      Luis Daniel Portillo RN  01/05/22 0020

## 2022-01-05 NOTE — PROGRESS NOTES
MD Oliver Cabezas MD Honor Fey, MD                Office: [phone]                      Fax: [phone]          Inpatient Dialysis Progress Note:     PATIENT NAME: Randy Durham  : 1975  MRN: [id number]    Indication for Dialysis: ESRD    Patient seen on dialysis treatment. Tolerating treatment  Fairly well    Vitals:    22 0820   BP: (!) 162/84   Pulse: 86   Resp: 24   Temp: 97.5 °F (36.4 °C)   SpO2: 98%       Awake, co-operative    Neck: Supple. no JVD. Cardiac: S1 and S2+, No pericardial rub. Chest: Normal respiratory effort,  no Rales. No rhonchi  Ext :  LE Edema +, no cynosis    Vascular Access:   Hemodialysis catheter location: tunneled internal jugular . Exit site demonstrates: normal findings; no signs of bleeding, redness, tenderness or discharge       Labs Reviewed  by me   Labs   Lab Results   Component Value Date    CREATININE 8.8 (HH) 2022    BUN 58 (H) 2022     (L) 2022    K 4.5 2022    CL 96 (L) 2022    CO2 18 (L) 2022     Lab Results   Component Value Date    WBC 15.1 (H) 2022    HGB 10.4 (L) 2022    HCT 33.0 (L) 2022    MCV 82.1 2022     2022       Dialysis Treatment and Prescription reviewed    RX:  See dialysis flowsheet for specifics on access, blood flow rate, dialysate baths, duration of dialysis, anticoagulation and other technical information. COMMENTS:  Stable on dialysis. Continue to Target dry weight and clearance. Monitor closely for any hypotension    : Tolerating dialysis well, with no complications. Hypotension on dialysis-stable to improved. Good flow access. :Anemia. Stable. Continue Aransep. :Fluid Overload. Stable    Fluid removal as tolerated with dialysis. :Stable from renal for discharge  Continue out patient Dialysis treatments. Please refer to the orders.    Dialysis treatment plan and dialysis

## 2022-01-05 NOTE — DISCHARGE SUMMARY
[facility] -- Physician Discharge Summary     Nancy Magallanes  1975  MRN: [id number]    Admit Date: 1/4/2022  Discharge Date: No discharge date for patient encounter.     Attending MD: Frank Arizmendi MD  Discharging MD: Frank Arizmendi MD  PCP: Radha Mary [address] [facility] [address] [facility] [address] [phone]    Admission Diagnosis: Debility [R53.81]  General weakness [R53.1]  ESRD (end stage renal disease) ([facility] [N18.6]  ESRD on hemodialysis ([facility] [N18.6, Z99.2]  Frequent falls [R29.6]  DISCHARGE DIAGNOSIS: same    Full Hospital Problem List:  Active Hospital Problems    Diagnosis Date Noted    ESRD (end stage renal disease) ([facility] [N18.6] 10/04/2021    Polyneuropathy due to type 2 diabetes mellitus ([facility] [E11.42] 09/23/2021    HTN (hypertension), benign [I10]     DM (diabetes mellitus), secondary, uncontrolled, w/neurologic complic ([facility] [S44.41, N81.44]     Anemia [D64.9] 10/05/2013    Mixed hyperlipidemia [E78.2]            Hospital Course:     55 y.o. female who presents with the above complaint frequent falls.  The patient states has fall issues. Phil Watson over the past several days she has had increasing falls with subsequent bruising.  She does live in a condo/apartment with her . Sanam Garcia does indicate that he has indicated to her that it is becoming more difficult to care for her. 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  She has been admitted this facility for general weakness on November 2, 2021 and December 27, 2021.  Patient diagnosis upon admission and discharge was hyperkalemia, general weakness, ESRD on dialysis.     Patient is followed by Dr. Amirah Hilliard nephrology.  The patient is dialyzed Monday, Wednesday and Friday.  Port in the right chest.  Dialyzed 3-3.5 hours.  Patient last dialyzed last Wednesday or about 6 days ago. Sanam Garcia missed her Friday and Monday dialysis appointment.  This may be reason for her increased falling over the past few days. She is admitted, dialyzed  Cleared for d/c from renal standpoint  Strongly urged to remain compliant with outpt HD schedule2    Consults made during Hospitalization:  IP CONSULT TO NEPHROLOGY  IP CONSULT TO INTERNAL MEDICINE  IP CONSULT TO NEPHROLOGY  IP CONSULT TO SOCIAL WORK    Treatment team at time of Discharge: Treatment Team: Attending Provider: Elana Desouza MD; Consulting Physician: Elana Desouza MD; Consulting Physician: Alexa Schmidt MD; Consulting Physician: Rosalva Velázquez MD; Registered Nurse: Curtis Webber RN; : Gricelda North, HAYLEE; Physical Therapist: Mercedes Cárdenas PT; Respiratory Therapist (Day): Roula Marvin; Occupational Therapist: Reny Garica OT    Imaging Results:  CT Head WO Contrast    Result Date: 1/4/2022  EXAMINATION: CT OF THE HEAD WITHOUT CONTRAST  1/4/2022 12:38 pm TECHNIQUE: CT of the head was performed without the administration of intravenous contrast. Dose modulation, iterative reconstruction, and/or weight based adjustment of the mA/kV was utilized to reduce the radiation dose to as low as reasonably achievable. COMPARISON: None. HISTORY: ORDERING SYSTEM PROVIDED HISTORY: Frequent falls TECHNOLOGIST PROVIDED HISTORY: Reason for exam:->Frequent falls Has a \"code stroke\" or \"stroke alert\" been called? ->No Decision Support Exception - unselect if not a suspected or confirmed emergency medical condition->Emergency Medical Condition (MA) Is the patient pregnant?->No Reason for Exam: frequent falls , hx cva FINDINGS: BRAIN/VENTRICLES: There is no acute intracranial hemorrhage, mass effect or midline shift. No abnormal extra-axial fluid collection. The gray-white differentiation is maintained without evidence of an acute infarct. There is no evidence of hydrocephalus. ORBITS: The visualized portion of the orbits demonstrate no acute abnormality.  SINUSES: The visualized paranasal sinuses and mastoid air cells demonstrate no acute abnormality. SOFT TISSUES/SKULL:  No acute abnormality of the visualized skull or soft tissues. No acute intracranial abnormality. RECOMMENDATIONS: Unavailable     CT HEAD WO CONTRAST    Result Date: 12/27/2021  EXAMINATION: CT OF THE HEAD WITHOUT CONTRAST  12/27/2021 4:50 pm TECHNIQUE: CT of the head was performed without the administration of intravenous contrast. Dose modulation, iterative reconstruction, and/or weight based adjustment of the mA/kV was utilized to reduce the radiation dose to as low as reasonably achievable. COMPARISON: 08/27/2021 HISTORY: ORDERING SYSTEM PROVIDED HISTORY: falls TECHNOLOGIST PROVIDED HISTORY: Has a \"code stroke\" or \"stroke alert\" been called? ->No Reason for exam:->falls Decision Support Exception - unselect if not a suspected or confirmed emergency medical condition->Emergency Medical Condition (MA) Is the patient pregnant?->No Reason for Exam: Fall, Fall (fell at dialysis today, was putting her shoe on and fell, unsure if she hit her head, no LOC, denies pain anywhere). FINDINGS: BRAIN/VENTRICLES: There is no acute intracranial hemorrhage, mass effect or midline shift. No abnormal extra-axial fluid collection. The gray-white differentiation is maintained without evidence of an acute infarct. There is no evidence of hydrocephalus. Patchy hypodensities in the periventricular and subcortical white matter, which are nonspecific, but may represent chronic small vessel ischemic change. ORBITS: The visualized portion of the orbits demonstrate no acute abnormality. SINUSES: The visualized paranasal sinuses and mastoid air cells demonstrate no acute abnormality. SOFT TISSUES/SKULL:  No acute abnormality of the visualized skull or soft tissues. No acute intracranial abnormality.      CT CERVICAL SPINE WO CONTRAST    Result Date: 12/27/2021  EXAMINATION: CT OF THE CERVICAL SPINE WITHOUT CONTRAST 12/27/2021 4:49 pm TECHNIQUE: CT of the cervical spine was performed without the administration of intravenous contrast. Multiplanar reformatted images are provided for review. Dose modulation, iterative reconstruction, and/or weight based adjustment of the mA/kV was utilized to reduce the radiation dose to as low as reasonably achievable. COMPARISON: None. HISTORY: ORDERING SYSTEM PROVIDED HISTORY: fall TECHNOLOGIST PROVIDED HISTORY: Reason for exam:->fall Decision Support Exception - unselect if not a suspected or confirmed emergency medical condition->Emergency Medical Condition (MA) Is the patient pregnant?->No Reason for Exam: Fall, Fall (fell at dialysis today, was putting her shoe on and fell, unsure if she hit her head, no LOC, denies pain anywhere). FINDINGS: BONES/ALIGNMENT: There is mild disc space narrowing throughout. There is no acute fracture or traumatic malalignment. DEGENERATIVE CHANGES: No significant degenerative changes. SOFT TISSUES: There is no prevertebral soft tissue swelling. There is a 2.5 cm hypodensity left lobe of thyroid gland inferiorly. There is a right IJ catheter in place. The lung apices are clear. Mild degenerative disc space narrowing throughout with no acute abnormality seen. 2.5 cm hypodensity left lobe of the thyroid gland. Suggest ultrasound follow-up. RECOMMENDATIONS: Unavailable     CT THORACIC SPINE WO CONTRAST    Result Date: 12/27/2021  EXAMINATION: CT OF THE THORACIC SPINE WITHOUT CONTRAST  12/27/2021 4:50 pm: TECHNIQUE: CT of the thoracic spine was performed without the administration of intravenous contrast. Multiplanar reformatted images are provided for review. Dose modulation, iterative reconstruction, and/or weight based adjustment of the mA/kV was utilized to reduce the radiation dose to as low as reasonably achievable. COMPARISON: None.  HISTORY: ORDERING SYSTEM PROVIDED HISTORY: fall TECHNOLOGIST PROVIDED HISTORY: Reason for exam:->fall Is the patient pregnant?->No Reason for Exam: Fall, Fall (fell at dialysis today, was putting her shoe on and fell, unsure if she hit her head, no LOC, denies pain anywhere). FINDINGS: BONES/ALIGNMENT: There is mild scoliotic curvature of the thoraco lumbar spine, with dextroconvexity. .  The vertebral body heights are maintained. No osseous destructive lesion is seen. DEGENERATIVE CHANGES: Anterolateral spurring is noted at multiple levels. There is no significant narrowing of the central canal. SOFT TISSUES: No paraspinal mass is seen. No acute fracture or subluxation of the thoracic spine. Multilevel spondylosis the RECOMMENDATIONS: Unavailable     CT LUMBAR SPINE WO CONTRAST    Result Date: 12/27/2021  EXAMINATION: CT OF THE LUMBAR SPINE WITHOUT CONTRAST  12/27/2021 TECHNIQUE: CT of the lumbar spine was performed without the administration of intravenous contrast. Multiplanar reformatted images are provided for review. Adjustment of mA and/or kV according to patient size was utilized. Dose modulation, iterative reconstruction, and/or weight based adjustment of the mA/kV was utilized to reduce the radiation dose to as low as reasonably achievable. COMPARISON: None HISTORY: ORDERING SYSTEM PROVIDED HISTORY: fall TECHNOLOGIST PROVIDED HISTORY: Reason for exam:->fall Decision Support Exception - unselect if not a suspected or confirmed emergency medical condition->Emergency Medical Condition (MA) Is the patient pregnant?->No Reason for Exam: Fall, Fall (fell at dialysis today, was putting her shoe on and fell, unsure if she hit her head, no LOC, denies pain anywhere). FINDINGS: BONES/ALIGNMENT: There is normal alignment of the spine. The vertebral body heights are maintained. No osseous destructive lesion is seen. DEGENERATIVE CHANGES: Mild facet arthropathy. Anterior marginal endplate spurs most pronounced at L1-L2. Otherwise minimal disc space disease identified. SOFT TISSUES/RETROPERITONEUM: No paraspinal mass is seen.   There is a borderline retroperitoneal lymph nodes up to 9 mm short axis dimension. No evidence acute fracture traumatic malalignment of the lumbar spine. Borderline/upper limits normal lymph nodes within the retroperitoneum 9 mm short axis dimension. Please correlate with history and laboratory testing. RECOMMENDATIONS: Unavailable     XR CHEST PORTABLE    Result Date: 1/4/2022  EXAMINATION: ONE XRAY VIEW OF THE CHEST 1/4/2022 12:25 pm COMPARISON: 12/27/2021 HISTORY: ORDERING SYSTEM PROVIDED HISTORY: Frequent falls TECHNOLOGIST PROVIDED HISTORY: Reason for exam:->Frequent falls Reason for Exam: Fall (Pt reports frequent falls FINDINGS: Right-sided central venous catheter remains in place. The lungs are without acute focal process. There is no effusion or pneumothorax. The cardiomediastinal silhouette is stable. The osseous structures are stable. No acute process. XR CHEST PORTABLE    Result Date: 12/27/2021  EXAMINATION: ONE X-RAY VIEW OF THE CHEST 12/27/2021 3:41 pm COMPARISON: November 2, 2021 HISTORY: ORDERING SYSTEM PROVIDED HISTORY:  Falls TECHNOLOGIST PROVIDED HISTORY: Reason for Exam:  Falls Reason for Exam:  Falls FINDINGS: The cardiomediastinal silhouette is enlarged but stable. Right-sided central venous catheter extends to the right atrium. Lung volumes are low. No evidence of acute pulmonary edema or acute infiltrate. No pleural effusion or pneumothorax. Stable cardiomegaly. No acute cardiopulmonary process. XR HIP 2-3 VW W PELVIS RIGHT    Result Date: 1/4/2022  EXAMINATION: ONE XRAY VIEW OF THE PELVIS AND TWO XRAY VIEWS RIGHT HIP 1/4/2022 2:07 pm COMPARISON: None. HISTORY: ORDERING SYSTEM PROVIDED HISTORY:  Fall TECHNOLOGIST PROVIDED HISTORY: Reason For Exam:   Fall FINDINGS: Frontal view of the pelvis and dedicated imaging of the right hip demonstrate no acute fracture or dislocation. Normal bony mineralization. No significant bilateral hip osteoarthritis. SI joints and sacral arcuate lines appear intact. Evaluation is mildly limited by overlying bowel gas and stool. No soft tissue abnormality. 1. No acute osseous abnormality of the pelvis and right hip. Discharge Exam: (2-7 system for EPF/Detailed, ?8 for Comprehensive)  BP (!) 164/77   Pulse 98   Temp 98.3 °F (36.8 °C) (Axillary)   Resp 20   Ht 5' 7\" (1.702 m)   Wt 181 lb 3.5 oz (82.2 kg)   LMP 12/27/2021   SpO2 95%   BMI 28.38 kg/m²   See progress note from day of d/c         Disposition: home    Condition: stable    Discharge Medications:  [unfilled]       Medication List      CONTINUE taking these medications     * albuterol sulfate  (90 Base) MCG/ACT inhaler; Commonly known   as: Proventil HFA; Inhale 2 puffs into the lungs every 4 hours as needed   for Wheezing   * albuterol sulfate  (90 Base) MCG/ACT inhaler; Inhale 2 puffs   into the lungs every 6 hours as needed for Wheezing or Shortness of Breath   * albuterol 2.5 MG/0.5ML Nebu nebulizer solution; Commonly known as:   PROVENTIL; Take 0.5 mLs by nebulization every 6 hours as needed for   Wheezing or Shortness of Breath   ALPRAZolam 3 MG extended release tablet; Commonly known as: XANAX XR;   TAKE ONE TABLET BY MOUTH EVERY MORNING   amLODIPine 10 MG tablet; Commonly known as: NORVASC   aspirin 81 MG EC tablet; Take 1 tablet by mouth daily   atorvastatin 40 MG tablet; Commonly known as: LIPITOR; Take 1 tablet by   mouth nightly   benzonatate 200 MG capsule; Commonly known as: TESSALON;  Take 1 capsule   by mouth every 8 hours as needed for Cough   Breathe Right Extra Strength Strp; 1 strip by Does not apply route   nightly as needed (nasal congestion)   buPROPion 150 MG extended release tablet; Commonly known as: WELLBUTRIN   XL; Take 1 tablet by mouth every morning   cloNIDine 0.2 MG/24HR Ptwk; Commonly known as: CATAPRES; Place 1 patch   onto the skin once a week   Dulaglutide 1.5 MG/0.5ML Sopn; Inject 1.5 mg into the skin once a week   fluvoxaMINE 100 MG tablet; Commonly known as: Nancy Lace; Take 1 tablet by   mouth nightly   furosemide 40 MG tablet; Commonly known as: LASIX   glucose 40 % Gel; Commonly known as: GLUTOSE; Take 37.5 mLs by mouth as   needed (low blood sugar)   glycopyrrolate-formoterol 9-4.8 MCG/ACT Aero; Commonly known as: Bevespi   Aerosphere; Inhale 2 puffs into the lungs 2 times daily   hydrOXYzine 25 MG tablet; Commonly known as: ATARAX   insulin lispro (1 Unit Dial) 100 UNIT/ML Sopn; Inject 0-6 Units into the   skin 3 times daily (with meals) **Corrective Low Dose Algorithm**   Glucose: Dose:              No Insulin 140-199 1 Unit 200-249 2   Units 250-299 3 Units 300-349 4 Units 350-399 5 Units Over 399 6 Units   Insulin Pen Needle 29G X 12.7MM Misc; 1 each by Does not apply route   daily   Lantus SoloStar 100 UNIT/ML injection pen; Generic drug: insulin   glargine   lisinopril 40 MG tablet; Commonly known as: PRINIVIL;ZESTRIL   methyl salicylate-menthol 71-93 % Crea; Apply topically 3 times daily as   needed for Pain   pregabalin 75 MG capsule; Commonly known as: LYRICA; TAKE ONE CAPSULE BY   MOUTH EVERY NIGHT   propranolol 80 MG extended release capsule; Commonly known as: INDERAL   LA; TAKE ONE CAPSULE BY MOUTH EVERY MORNING   Pulse Oximeter Misc; 1 each by Does not apply route every 6-8 hours as   needed (shortness of breath)   spironolactone 50 MG tablet; Commonly known as: ALDACTONE; TAKE ONE   TABLET BY MOUTH DAILY   ThermaCare Back/Hip Misc; 1 each by Does not apply route daily as needed   (back pain)  * This list has 3 medication(s) that are the same as other medications   prescribed for you. Read the directions carefully, and ask your doctor or   other care provider to review them with you. Allergies:   Allergies   Allergen Reactions    Amoxicillin Hives, Itching and Other (See Comments)     Tolerates cephalosporins  Patient tolerating cefazolin (ANCEF) as of October 11, 2018     Jonnathan Mak Levofloxacin Anaphylaxis    Vancomycin Anaphylaxis, Hives and Shortness Of Breath    Tape [Adhesive Tape] Other (See Comments)     Paper tape turns skin bright red. Plastic tape okay. Follow up Instructions: Follow-up with PCP: Pallavi Armstrong in  2 wk .       Total time spent on day of discharge including face-to-face visit, examination, documentation, counseling, preparation of discharge plans and followup, and discharge medicine reconciliation and presciptions is 31 minutes    (Please note that portions of this note were completed with a voice recognition program.  Efforts were made to edit the dictations but occasionally words are mis-transcribed.)      Signed:  Hal Velázquez MD  1/5/2022

## 2022-01-05 NOTE — ED NOTES
This RN attempted to call report at this time, told RN would call back.      Ida Jeff RN  01/04/22 [id number]

## 2022-01-05 NOTE — ED NOTES
PT report called to Lex Lowe RN at this time, she states no further questions or concerns.      Celia Lee RN  01/04/22 [id number]

## 2022-01-05 NOTE — FLOWSHEET NOTE
01/05/22 0849 01/05/22 1229   Vital Signs   BP (!) 165/84 (!) 150/83   Temp 97.2 °F (36.2 °C) 97.9 °F (36.6 °C)   Pulse 86 90   Resp 20 20   SpO2 95 %  --    Weight 183 lb 10.3 oz (83.3 kg) 181 lb 3.5 oz (82.2 kg)     HD treatment tolerated well, UF goal 1 Liter met. No c/o cramping this treatment. No meds given. Site WNL, dsg changed per protocol. Post wt 82.2 kg.

## 2022-01-05 NOTE — PROGRESS NOTES
Occupational Therapy   Occupational Therapy Initial Assessment  Date: 2022   Patient Name: Maribel Iglesias  MRN: [id number]     : 1975    Date of Service: 2022    Discharge Recommendations:  Maribel Iglesias scored a 14/24 on the AM-PAC ADL Inpatient form. Current research shows that an AM-PAC score of 17 or less is typically not associated with a discharge to the patient's home setting. Based on the patient's AM-PAC score and their current ADL deficits, it is recommended that the patient have 5-7 sessions per week of Occupational Therapy at d/c to increase the patient's independence. At this time, this patient demonstrates the endurance, and/or tolerance for 3 hours of therapy each day, with a treatment frequency of 5-7x/wk. Please see assessment section for further patient specific details. If patient discharges prior to next session this note will serve as a discharge summary. Please see below for the latest assessment towards goals. OT Equipment Recommendations  Equipment Needed: No (TBD next level of care)    Assessment   Performance deficits / Impairments: Decreased functional mobility ; Decreased ADL status; Decreased endurance;Decreased balance;Decreased vision/visual deficit; Decreased safe awareness  Assessment: Pt is well below her baseline level of occupation function, based on the above deficits associated with ESRD. Pt has a chronic visual deficit. Pt would benefit from continued skilled acute OT services to address these deficits. Treatment Diagnosis: Decreased ADL status, functional mobility, endurance, balance, and safety awareness associated with ESRD  Prognosis: Good  Decision Making: Medium Complexity  History: Pt lives w/spouse, previously S level w/ADLs, multiple falls PTA  Exam: As above  OT Education: OT Role;Plan of Care  Patient Education: D/C recommendation. Pt verbalized understanding. Needs reinforcement of learning.   Barriers to Learning: Visual  REQUIRES OT FOLLOW UP: Yes  Activity Tolerance  Activity Tolerance: Patient Tolerated treatment well  Safety Devices  Safety Devices in place: Yes  Type of devices: All fall risk precautions in place; Left in bed;Bed alarm in place;Nurse notified;Gait belt;Call light within reach (PT staff still with pt when OT left the room.)           Patient Diagnosis(es): The primary encounter diagnosis was Frequent falls. Diagnoses of General weakness, Debility, ESRD on hemodialysis ([address] and Generalized anxiety disorder with panic attacks were also pertinent to this visit. has a past medical history of Blind in both eyes, Cerebral artery occlusion with cerebral infarction ([address] CHF (congestive heart failure) ([address] Chronic kidney disease, Depression, Diabetes mellitus out of control ([address] Diabetes mellitus, type II ([address] Diabetic neuropathy associated with type 2 diabetes mellitus ([facility] [address] Generalized headaches, Hypertension, Infertility, Insomnia, Migraine headache, Mixed hyperlipidemia, Otitis media, Pelvic abscess in female, Pneumonia, Stroke [facility]), and Stroke ([facility] [address] has a past surgical history that includes Cervix surgery; eye surgery; Foot surgery (Right); Foot surgery (Bilateral); Foot surgery (Left); and IR TUNNELED CVC PLACE WO SQ PORT/PUMP > 5 YEARS (9/7/2021). Treatment Diagnosis: Decreased ADL status, functional mobility, endurance, balance, and safety awareness associated with ESRD      Restrictions  Restrictions/Precautions  Restrictions/Precautions: Fall Risk,Contact Precautions (high fall risk)  Required Braces or Orthoses?: No  Position Activity Restriction  Other position/activity restrictions: 55 y.o. female who presents with the above complaint frequent falls. The patient states has fall issues. However over the past several days she has had increasing falls with subsequent bruising. She does live in a condo/apartment with her .   She does indicate that he has indicated to her that it is becoming more difficult to care for her. 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 She has been admitted this facility for general weakness on November 2, 2021 and December 27, 2021. Patient diagnosis upon admission and discharge was hyperkalemia, general weakness, ESRD on dialysis. Subjective   General  Chart Reviewed: Yes  Patient assessed for rehabilitation services?: Yes  Family / Caregiver Present: No  Diagnosis: ESRD  Subjective  Subjective: Pt in semi-hannah's position in bed eating lunch. Pt agreeable to OT eval. Pt reports 7/10 pain R knee & neck, 6/10 pain L knee, general aches and pains, rated 1-5. RN notified.   Patient Currently in Pain: Yes  Pain Assessment  Pain Level: 7 (R knee (1/10), L knee (6/10), neck (7/10) general body aches and pains (rates between 1-5/10))  Pain Type: Acute pain  Patient Currently in Pain: Yes  Oxygen Therapy  SpO2: 92 %  Pulse Oximeter Device Mode: Intermittent  Pulse Oximeter Device Location: Finger  O2 Device: None (Room air)  O2 Flow Rate (L/min): 2 L/min  Social/Functional History  Social/Functional History  Lives With: Spouse (able to provide 24/7 assist (marital issues may affect this; pt reports that spouse is not supportive))  Type of Home: Apartment (condo, ground level[facility])  Home Layout: One level  Home Access: Level entry  Bathroom Shower/Tub: Walk-in shower,Doors,Shower chair with back  Bathroom Toilet: Standard  Bathroom Equipment: Toilet raiser,Grab bars in shower,Hand-held shower  Bathroom Accessibility: Accessible  Home Equipment: Rolling walker,Reacher (working on getting a rollator)  ADL Assistance: Needs assistance (spouse provides supervision for showers to make sure she gets all the soap off and assists with matching colors when dressing)  Homemaking Assistance: Needs assistance (pt assists with cooking, spouse does cooking and cleaning)  Active : No  Patient's  Info:  WFL  Left Hand AROM (degrees)  Left Hand AROM: WFL  RUE AROM (degrees)  RUE AROM : WFL  Right Hand AROM (degrees)  Right Hand AROM: WFL  LUE Strength  Gross LUE Strength: WFL (grossly 4+/5 elbow, shoulder)  L Hand General: 4+/5  RUE Strength  Gross RUE Strength: WFL (grossly 4+/5 elbow, shoulder)  R Hand General: 4+/5                   Plan   Plan  Times per week: 3-5  Times per day: Daily  Current Treatment Recommendations: Safety Education & Training,Self-Care / ADL,Endurance Training,Functional Mobility Training,Balance Training  AM-PAC Score        AM-[facility] Inpatient Daily Activity Raw Score: 14 (01/05/22 1610)  AM-PAC Inpatient ADL T-Scale Score : 33.39 (01/05/22 1610)  ADL Inpatient CMS 0-100% Score: 59.67 (01/05/22 1610)  ADL Inpatient CMS G-Code Modifier : CK (01/05/22 1610)    Goals  Short term goals  Time Frame for Short term goals: Discharge  Short term goal 1: Mod A for bed mobility  Short term goal 2: Supervision for UB ADLs seated in chair  Short term goal 3: Mod A for LB ADLs w/AE as needed  Short term goal 4: Mod A for functional transfers to ADL surfaces w/RW       Therapy Time   Individual Concurrent Group Co-treatment   Time In 1355         Time Out 1434         Minutes 39               Timed Code Treatment Minutes:  24 min    Total Treatment Minutes:  39 min    NAY Dodd 66, OT   Sepideh Paulino., OTR/L, [id number]

## 2022-01-05 NOTE — PROGRESS NOTES
[N18.6] 10/04/2021    Polyneuropathy due to type 2 diabetes mellitus ([facility] [E11.42] 09/23/2021    HTN (hypertension), benign [I10]     DM (diabetes mellitus), secondary, uncontrolled, w/neurologic complic ([facility] [N34.24, U30.87]     Anemia [D64.9] 10/05/2013    Mixed hyperlipidemia [E78.2]        Current Facility-Administered Medications: aspirin EC tablet 81 mg, 81 mg, Oral, Daily  atorvastatin (LIPITOR) tablet 40 mg, 40 mg, Oral, Nightly  tiotropium-olodaterol (STIOLTO) 2.5-2.5 MCG/ACT inhaler 2 puff, 2 puff, Inhalation, Daily  insulin glargine (LANTUS;BASAGLAR) injection pen 20 Units, 20 Units, SubCUTAneous, QAM  lisinopril (PRINIVIL;ZESTRIL) tablet 40 mg, 40 mg, Oral, Daily  albuterol sulfate  (90 Base) MCG/ACT inhaler 2 puff, 2 puff, Inhalation, Q4H PRN  cloNIDine (CATAPRES) 0.2 MG/24HR 1 patch, 1 patch, TransDERmal, Weekly  amLODIPine (NORVASC) tablet 10 mg, 10 mg, Oral, Daily  hydrOXYzine (ATARAX) tablet 25 mg, 25 mg, Oral, 4x Daily PRN  benzonatate (TESSALON) capsule 200 mg, 200 mg, Oral, Q8H PRN  spironolactone (ALDACTONE) tablet 50 mg, 50 mg, Oral, Daily  propranolol (INDERAL LA) extended release capsule 80 mg, 80 mg, Oral, Daily  furosemide (LASIX) tablet 10 mg, 10 mg, Oral, Daily  buPROPion (WELLBUTRIN XL) extended release tablet 150 mg, 150 mg, Oral, QAM  pregabalin (LYRICA) capsule 75 mg, 75 mg, Oral, Nightly  sodium chloride flush 0.9 % injection 10 mL, 10 mL, IntraVENous, 2 times per day  sodium chloride flush 0.9 % injection 10 mL, 10 mL, IntraVENous, PRN  0.9 % sodium chloride infusion, 25 mL, IntraVENous, PRN  heparin (porcine) injection 5,000 Units, 5,000 Units, SubCUTAneous, 3 times per day  ondansetron (ZOFRAN-ODT) disintegrating tablet 4 mg, 4 mg, Oral, Q8H PRN **OR** ondansetron (ZOFRAN) injection 4 mg, 4 mg, IntraVENous, Q6H PRN  acetaminophen (TYLENOL) tablet 650 mg, 650 mg, Oral, Q6H PRN **OR** acetaminophen (TYLENOL) suppository 650 mg, 650 mg, Rectal, Q6H PRN  hydrALAZINE (APRESOLINE) injection 10 mg, 10 mg, IntraVENous, Q6H PRN  0.9 % sodium chloride bolus, 500 mL, IntraVENous, PRN  insulin lispro (1 Unit Dial) 0-12 Units, 0-12 Units, SubCUTAneous, TID WC  insulin lispro (1 Unit Dial) 0-6 Units, 0-6 Units, SubCUTAneous, Nightly  glucose (GLUTOSE) 40 % oral gel 15 g, 15 g, Oral, PRN  dextrose 50 % IV solution, 12.5 g, IntraVENous, PRN  glucagon (rDNA) injection 1 mg, 1 mg, IntraMUSCular, PRN  dextrose 5 % solution, 100 mL/hr, IntraVENous, PRN  ALPRAZolam (XANAX) tablet 0.75 mg, 0.75 mg, Oral, 4 times per day         Objective:  BP (!) 155/80   Pulse 90   Temp 98.1 °F (36.7 °C) (Oral)   Resp 14   Ht 5' 7\" (1.702 m)   Wt 181 lb 6.4 oz (82.3 kg)   LMP 12/27/2021   SpO2 92%   BMI 28.41 kg/m²      Patient Vitals for the past 24 hrs:   BP Temp Temp src Pulse Resp SpO2 Height Weight   01/05/22 0415 (!) 155/80 98.1 °F (36.7 °C) Oral 90 14 92 % -- --   01/05/22 0207 -- -- -- -- -- -- 5' 7\" (1.702 m) 181 lb 6.4 oz (82.3 kg)   01/05/22 0130 (!) 145/76 97.8 °F (36.6 °C) Oral 91 14 99 % -- --   01/05/22 0030 (!) 126/101 -- -- 88 13 -- -- --   01/05/22 0015 (!) 161/88 -- -- 86 19 95 % -- --   01/05/22 0000 (!) 168/102 -- -- 85 19 95 % -- --   01/04/22 2345 (!) 139/109 -- -- 84 18 97 % -- --   01/04/22 2330 (!) 169/89 -- -- 85 18 97 % -- --   01/04/22 2315 (!) 169/97 -- -- 83 18 94 % -- --   01/04/22 2300 (!) 153/95 -- -- 83 18 94 % -- --   01/04/22 2245 (!) 149/95 -- -- 83 17 96 % -- --   01/04/22 2230 (!) 165/95 -- -- 83 16 97 % -- --   01/04/22 2215 (!) 161/94 -- -- 82 16 97 % -- --   01/04/22 2200 (!) 157/101 -- -- 82 16 98 % -- --   01/04/22 2145 (!) 169/94 -- -- 82 18 99 % -- --   01/04/22 2130 (!) 163/86 -- -- 81 18 99 % -- --   01/04/22 2115 (!) 161/89 -- -- 80 18 98 % -- --   01/04/22 2100 (!) 172/93 -- -- 80 17 100 % -- --   01/04/22 2045 (!) 172/87 -- -- 79 18 97 % -- --   01/04/22 2030 (!) 172/84 -- -- 80 18 95 % -- --   01/04/22 2015 (!) 159/76 -- -- 78 16 100 % -- -- 01/04/22 2000 (!) 172/141 -- -- 78 16 99 % -- --   01/04/22 1945 (!) 155/80 -- -- 77 16 -- -- --   01/04/22 1930 (!) 169/80 -- -- 77 17 -- -- --   01/04/22 1915 (!) 149/78 -- -- 77 19 -- -- --   01/04/22 1900 129/72 -- -- 75 14 -- -- --   01/04/22 1800 129/84 -- -- 74 15 95 % -- --   01/04/22 1745 136/78 -- -- 75 15 95 % -- --   01/04/22 1730 137/70 -- -- 75 15 93 % -- --   01/04/22 1715 (!) 146/77 -- -- 74 15 -- -- --   01/04/22 1700 (!) 143/82 -- -- 76 15 -- -- --   01/04/22 1645 137/78 -- -- 76 15 -- -- --   01/04/22 1630 (!) 154/76 -- -- 77 15 92 % -- --   01/04/22 1615 (!) 150/88 -- -- 77 14 95 % -- --   01/04/22 1600 139/84 -- -- -- -- -- -- --   01/04/22 1530 (!) 154/85 -- -- 77 16 -- -- --   01/04/22 1515 -- -- -- 78 15 (!) 86 % -- --   01/04/22 1500 -- -- -- 79 15 (!) 89 % -- --   01/04/22 1445 -- -- -- 79 15 92 % -- --   01/04/22 1415 (!) 153/80 -- -- 81 14 96 % -- --   01/04/22 1152 134/71 98 °F (36.7 °C) -- 85 17 97 % -- 187 lb (84.8 kg)     Patient Vitals for the past 96 hrs (Last 3 readings):   Weight   01/05/22 0207 181 lb 6.4 oz (82.3 kg)   01/04/22 1152 187 lb (84.8 kg)           Intake/Output Summary (Last 24 hours) at 1/5/2022 0802  Last data filed at 1/5/2022 0030  Gross per 24 hour   Intake --   Output 900 ml   Net -900 ml         Physical Exam:   Vitals as above  General appearance: alert, appears stated age and cooperative    Head: Normocephalic, without obvious abnormality, atraumatic    Lungs: clear to auscultation bilaterally    Heart: regular rate and rhythm, S1, S2 normal, no murmur    Abdomen: soft, non-tender; bowel sounds normal; no masses, no organomegaly    Extremities: extremities normal, atraumatic, no cyanosis, no edema      Labs:  Lab Results   Component Value Date    WBC 15.1 (H) 01/05/2022    HGB 10.4 (L) 01/05/2022    HCT 33.0 (L) 01/05/2022     01/04/2022    CHOL 164 02/24/2021    TRIG 319 (H) 08/31/2021    HDL 41 02/24/2021    LDLDIRECT 100 (H) 04/18/2011    ALT 15 01/05/2022    AST 29 01/05/2022     (L) 01/05/2022    K 4.3 01/04/2022    CL 96 (L) 01/05/2022    CREATININE 8.8 (HH) 01/05/2022    BUN 58 (H) 01/05/2022    CO2 18 (L) 01/05/2022    INR 1.08 01/04/2022    LABA1C 6.7 12/27/2021    LABMICR YES 01/04/2022     Lab Results   Component Value Date    CKTOTAL 25 (L) 09/13/2021    TROPONINI 0.27 (H) 01/04/2022       Recent Imaging Results are Reviewed:  CT Head WO Contrast    Result Date: 1/4/2022  EXAMINATION: CT OF THE HEAD WITHOUT CONTRAST  1/4/2022 12:38 pm TECHNIQUE: CT of the head was performed without the administration of intravenous contrast. Dose modulation, iterative reconstruction, and/or weight based adjustment of the mA/kV was utilized to reduce the radiation dose to as low as reasonably achievable. COMPARISON: None. HISTORY: ORDERING SYSTEM PROVIDED HISTORY: Frequent falls TECHNOLOGIST PROVIDED HISTORY: Reason for exam:->Frequent falls Has a \"code stroke\" or \"stroke alert\" been called? ->No Decision Support Exception - unselect if not a suspected or confirmed emergency medical condition->Emergency Medical Condition (MA) Is the patient pregnant?->No Reason for Exam: frequent falls , hx cva FINDINGS: BRAIN/VENTRICLES: There is no acute intracranial hemorrhage, mass effect or midline shift. No abnormal extra-axial fluid collection. The gray-white differentiation is maintained without evidence of an acute infarct. There is no evidence of hydrocephalus. ORBITS: The visualized portion of the orbits demonstrate no acute abnormality. SINUSES: The visualized paranasal sinuses and mastoid air cells demonstrate no acute abnormality. SOFT TISSUES/SKULL:  No acute abnormality of the visualized skull or soft tissues. No acute intracranial abnormality.  RECOMMENDATIONS: Unavailable     CT HEAD WO CONTRAST    Result Date: 12/27/2021  EXAMINATION: CT OF THE HEAD WITHOUT CONTRAST  12/27/2021 4:50 pm TECHNIQUE: CT of the head was performed without the administration of intravenous contrast. Dose modulation, iterative reconstruction, and/or weight based adjustment of the mA/kV was utilized to reduce the radiation dose to as low as reasonably achievable. COMPARISON: 08/27/2021 HISTORY: ORDERING SYSTEM PROVIDED HISTORY: falls TECHNOLOGIST PROVIDED HISTORY: Has a \"code stroke\" or \"stroke alert\" been called? ->No Reason for exam:->falls Decision Support Exception - unselect if not a suspected or confirmed emergency medical condition->Emergency Medical Condition (MA) Is the patient pregnant?->No Reason for Exam: Fall, Fall (fell at dialysis today, was putting her shoe on and fell, unsure if she hit her head, no LOC, denies pain anywhere). FINDINGS: BRAIN/VENTRICLES: There is no acute intracranial hemorrhage, mass effect or midline shift. No abnormal extra-axial fluid collection. The gray-white differentiation is maintained without evidence of an acute infarct. There is no evidence of hydrocephalus. Patchy hypodensities in the periventricular and subcortical white matter, which are nonspecific, but may represent chronic small vessel ischemic change. ORBITS: The visualized portion of the orbits demonstrate no acute abnormality. SINUSES: The visualized paranasal sinuses and mastoid air cells demonstrate no acute abnormality. SOFT TISSUES/SKULL:  No acute abnormality of the visualized skull or soft tissues. No acute intracranial abnormality. CT CERVICAL SPINE WO CONTRAST    Result Date: 12/27/2021  EXAMINATION: CT OF THE CERVICAL SPINE WITHOUT CONTRAST 12/27/2021 4:49 pm TECHNIQUE: CT of the cervical spine was performed without the administration of intravenous contrast. Multiplanar reformatted images are provided for review. Dose modulation, iterative reconstruction, and/or weight based adjustment of the mA/kV was utilized to reduce the radiation dose to as low as reasonably achievable. COMPARISON: None.  HISTORY: ORDERING SYSTEM PROVIDED HISTORY: fall TECHNOLOGIST PROVIDED HISTORY: Reason for exam:->fall Decision Support Exception - unselect if not a suspected or confirmed emergency medical condition->Emergency Medical Condition (MA) Is the patient pregnant?->No Reason for Exam: Fall, Fall (fell at dialysis today, was putting her shoe on and fell, unsure if she hit her head, no LOC, denies pain anywhere). FINDINGS: BONES/ALIGNMENT: There is mild disc space narrowing throughout. There is no acute fracture or traumatic malalignment. DEGENERATIVE CHANGES: No significant degenerative changes. SOFT TISSUES: There is no prevertebral soft tissue swelling. There is a 2.5 cm hypodensity left lobe of thyroid gland inferiorly. There is a right IJ catheter in place. The lung apices are clear. Mild degenerative disc space narrowing throughout with no acute abnormality seen. 2.5 cm hypodensity left lobe of the thyroid gland. Suggest ultrasound follow-up. RECOMMENDATIONS: Unavailable     CT THORACIC SPINE WO CONTRAST    Result Date: 12/27/2021  EXAMINATION: CT OF THE THORACIC SPINE WITHOUT CONTRAST  12/27/2021 4:50 pm: TECHNIQUE: CT of the thoracic spine was performed without the administration of intravenous contrast. Multiplanar reformatted images are provided for review. Dose modulation, iterative reconstruction, and/or weight based adjustment of the mA/kV was utilized to reduce the radiation dose to as low as reasonably achievable. COMPARISON: None. HISTORY: ORDERING SYSTEM PROVIDED HISTORY: fall TECHNOLOGIST PROVIDED HISTORY: Reason for exam:->fall Is the patient pregnant?->No Reason for Exam: Fall, Fall (fell at dialysis today, was putting her shoe on and fell, unsure if she hit her head, no LOC, denies pain anywhere). FINDINGS: BONES/ALIGNMENT: There is mild scoliotic curvature of the thoraco lumbar spine, with dextroconvexity. .  The vertebral body heights are maintained. No osseous destructive lesion is seen. DEGENERATIVE CHANGES: Anterolateral spurring is noted at multiple levels. There is no significant narrowing of the central canal. SOFT TISSUES: No paraspinal mass is seen. No acute fracture or subluxation of the thoracic spine. Multilevel spondylosis the RECOMMENDATIONS: Unavailable     CT LUMBAR SPINE WO CONTRAST    Result Date: 12/27/2021  EXAMINATION: CT OF THE LUMBAR SPINE WITHOUT CONTRAST  12/27/2021 TECHNIQUE: CT of the lumbar spine was performed without the administration of intravenous contrast. Multiplanar reformatted images are provided for review. Adjustment of mA and/or kV according to patient size was utilized. Dose modulation, iterative reconstruction, and/or weight based adjustment of the mA/kV was utilized to reduce the radiation dose to as low as reasonably achievable. COMPARISON: None HISTORY: ORDERING SYSTEM PROVIDED HISTORY: fall TECHNOLOGIST PROVIDED HISTORY: Reason for exam:->fall Decision Support Exception - unselect if not a suspected or confirmed emergency medical condition->Emergency Medical Condition (MA) Is the patient pregnant?->No Reason for Exam: Fall, Fall (fell at dialysis today, was putting her shoe on and fell, unsure if she hit her head, no LOC, denies pain anywhere). FINDINGS: BONES/ALIGNMENT: There is normal alignment of the spine. The vertebral body heights are maintained. No osseous destructive lesion is seen. DEGENERATIVE CHANGES: Mild facet arthropathy. Anterior marginal endplate spurs most pronounced at L1-L2. Otherwise minimal disc space disease identified. SOFT TISSUES/RETROPERITONEUM: No paraspinal mass is seen. There is a borderline retroperitoneal lymph nodes up to 9 mm short axis dimension. No evidence acute fracture traumatic malalignment of the lumbar spine. Borderline/upper limits normal lymph nodes within the retroperitoneum 9 mm short axis dimension. Please correlate with history and laboratory testing.  RECOMMENDATIONS: Unavailable     XR CHEST PORTABLE    Result Date: 1/4/2022  EXAMINATION: ONE XRAY VIEW OF THE CHEST 1/4/2022 12:25 pm COMPARISON: 12/27/2021 HISTORY: ORDERING SYSTEM PROVIDED HISTORY: Frequent falls TECHNOLOGIST PROVIDED HISTORY: Reason for exam:->Frequent falls Reason for Exam: Fall (Pt reports frequent falls FINDINGS: Right-sided central venous catheter remains in place. The lungs are without acute focal process. There is no effusion or pneumothorax. The cardiomediastinal silhouette is stable. The osseous structures are stable. No acute process. XR CHEST PORTABLE    Result Date: 12/27/2021  EXAMINATION: ONE X-RAY VIEW OF THE CHEST 12/27/2021 3:41 pm COMPARISON: November 2, 2021 HISTORY: ORDERING SYSTEM PROVIDED HISTORY:  Falls TECHNOLOGIST PROVIDED HISTORY: Reason for Exam:  Falls Reason for Exam:  Falls FINDINGS: The cardiomediastinal silhouette is enlarged but stable. Right-sided central venous catheter extends to the right atrium. Lung volumes are low. No evidence of acute pulmonary edema or acute infiltrate. No pleural effusion or pneumothorax. Stable cardiomegaly. No acute cardiopulmonary process. XR HIP 2-3 VW W PELVIS RIGHT    Result Date: 1/4/2022  EXAMINATION: ONE XRAY VIEW OF THE PELVIS AND TWO XRAY VIEWS RIGHT HIP 1/4/2022 2:07 pm COMPARISON: None. HISTORY: ORDERING SYSTEM PROVIDED HISTORY:  Fall TECHNOLOGIST PROVIDED HISTORY: Reason For Exam:   Fall FINDINGS: Frontal view of the pelvis and dedicated imaging of the right hip demonstrate no acute fracture or dislocation. Normal bony mineralization. No significant bilateral hip osteoarthritis. SI joints and sacral arcuate lines appear intact. Evaluation is mildly limited by overlying bowel gas and stool. No soft tissue abnormality. 1. No acute osseous abnormality of the pelvis and right hip.        Lab Results   Component Value Date    GLUCOSE 138 01/05/2022     Lab Results   Component Value Date    POCGLU 144 01/05/2022     BP (!) 155/80   Pulse 90   Temp 98.1 °F (36.7 °C) (Oral)   Resp 14   Ht 5' 7\" (1.702 m)   Wt 181 lb 6.4 oz (82.3 kg)   LMP 12/27/2021   SpO2 92%   BMI 28.41 kg/m²     Assessment and Plan:  Principal Problem:    ESRD (end stage renal disease) ([facility] -Established problem. Stable. Pt is noncompliant  Plan: HD today  Active Problems:    Mixed hyperlipidemia -Established problem. Stable. Plan: Continue present orders/plan. Anemia -Established problem. Stable. Plan: No indication for transfusion. Cont to monitor h/h to assess progression of anemia. Recommend ferrous sulfate or MVI as outpatient. HTN (hypertension), benign -Established problem. Stable. 155/80  Plan: resume home meds    DM (diabetes mellitus), secondary, uncontrolled, w/neurologic complic ([facility] -Established problem. Stable. FSBS 144  Plan: cont ccc diet, sliding scale    Polyneuropathy due to type 2 diabetes mellitus ([facility]  Plan: Continue present orders/plan. Failure to thrive - PT/OT eval requested  Plan - will see if she qualifies for SNF.  Her repeated admissions show that she is unable to care for self      (Please note that portions of this note were completed with a voice recognition program.  Efforts were made to edit the dictations but occasionally words are mis-transcribed.)        Amber Glaser MD  1/5/2022

## 2022-01-05 NOTE — CARE COORDINATION
Discharge Planning Assessment  RN  discharge planner met with patient/ (and family member) to discuss reason for admission, current living situation, and potential needs at the time of discharge    Demographics/Insurance verified Yes- [employer]    Current type of dwelling:  Condo    Patient from ECF/SW confirmed with:  N/A    Living arrangements: lives with spouse    Level of function/Support: has been  Independent with ADL until recently    PCP:  Dr Molly Dacosta    Last Visit to PCP: 12/15/21    DME:  Jenni Alicia with any community resources/agencies/skilled home care:   outpatient HD  [address] [address]  [phone]   [id number] [address]    Medication compliance issues:   Denies    Financial issues that could impact healthcare:  No      Tentative discharge plan:  ARU vs SNF    Discussed and provided facilities of choice if transition to a skilled nursing facility is required at the time of discharge- pt's choice - [facility] [place] [id number]       Discussed with patient and/or family that on the day of discharge home tentative time of discharge will be between 10 AM and noon.     Transportation at the time of discharge:   TBD on d/c

## 2022-01-05 NOTE — RT PROTOCOL NOTE
RT Inhaler-Nebulizer Bronchodilator Protocol Note    There is a bronchodilator order in the chart from a provider indicating to follow the RT Bronchodilator Protocol and there is an Initiate RT Inhaler-Nebulizer Bronchodilator Protocol order as well (see protocol at bottom of note). CXR Findings:  XR CHEST PORTABLE    Result Date: 1/4/2022  No acute process. The findings from the last RT Protocol Assessment were as follows:   History Pulmonary Disease: None or smoker <15 pack years  Respiratory Pattern: Regular pattern and RR 12-20 bpm  Breath Sounds: Clear breath sounds  Cough: Strong, spontaneous, non-productive  Indication for Bronchodilator Therapy: None  Bronchodilator Assessment Score: 0    Aerosolized bronchodilator medication orders have been revised according to the RT Inhaler-Nebulizer Bronchodilator Protocol below. Respiratory Therapist to perform RT Therapy Protocol Assessment initially then follow the protocol. Repeat RT Therapy Protocol Assessment PRN for score 0-3 or on second treatment, BID, and PRN for scores above 3. No Indications - adjust the frequency to every 6 hours PRN wheezing or bronchospasm, if no treatments needed after 48 hours then discontinue using Per Protocol order mode. If indication present, adjust the RT bronchodilator orders based on the Bronchodilator Assessment Score as indicated below. Use Inhaler orders unless patient has one or more of the following: on home nebulizer, not able to hold breath for 10 seconds, is not alert and oriented, cannot activate and use MDI correctly, or respiratory rate 25 breaths per minute or more, then use the equivalent nebulizer order(s) with same Frequency and PRN reasons based on the score. If a patient is on this medication at home then do not decrease Frequency below that used at home.     0-3 - enter or revise RT bronchodilator order(s) to equivalent RT Bronchodilator order with Frequency of every 4 hours PRN for wheezing or increased work of breathing using Per Protocol order mode. 4-6 - enter or revise RT Bronchodilator order(s) to two equivalent RT bronchodilator orders with one order with BID Frequency and one order with Frequency of every 4 hours PRN wheezing or increased work of breathing using Per Protocol order mode. 7-10 - enter or revise RT Bronchodilator order(s) to two equivalent RT bronchodilator orders with one order with TID Frequency and one order with Frequency of every 4 hours PRN wheezing or increased work of breathing using Per Protocol order mode. 11-13 - enter or revise RT Bronchodilator order(s) to one equivalent RT bronchodilator order with QID Frequency and an Albuterol order with Frequency of every 4 hours PRN wheezing or increased work of breathing using Per Protocol order mode. Greater than 13 - enter or revise RT Bronchodilator order(s) to one equivalent RT bronchodilator order with every 4 hours Frequency and an Albuterol order with Frequency of every 2 hours PRN wheezing or increased work of breathing using Per Protocol order mode. RT to enter RT Home Evaluation for COPD & MDI Assessment order using Per Protocol order mode.     Electronically signed by Markell Graham RCP on 1/5/2022 at 4:19 AM

## 2022-01-05 NOTE — ED NOTES
This RN attempted to call 3A for report, told RN would call back.      Staci Savage RN  01/04/22 0240

## 2022-01-05 NOTE — CARE COORDINATION
Therapy recommending ARU. SNF referral sent to [facility] [place] [address] as a back up as per patient's choice.

## 2022-01-05 NOTE — PROGRESS NOTES
Physical Therapy    Facility/Department: [address] NURSING  Initial Assessment    NAME: Mary Stephens  : 1975  MRN: [id number]    Date of Service: 2022    Discharge Recommendations:  Mary Stephens scored a 9/24 on the AM-PAC short mobility form. Current research shows that an AM-PAC score of 17 or less is typically not associated with a discharge to the patient's home setting. Based on the patient's AM-PAC score and their current functional mobility deficits, it is recommended that the patient have 5-7 sessions per week of Physical Therapy at d/c to increase the patient's independence. At this time, this patient demonstrates the endurance, and/or tolerance for 3 hours of therapy each day, with a treatment frequency of 5-7x/wk. Please see assessment section for further patient specific details. If patient discharges prior to next session this note will serve as a discharge summary. Please see below for the latest assessment towards goals. Assessment   Body structures, Functions, Activity limitations: Decreased functional mobility ; Decreased endurance;Decreased balance;Decreased strength;Decreased posture  Assessment: Patient is extremely fatigued with activity. She has decreased endurance and decreased hip and knee strength. Patient has difficulty sitting and standing without complete assist as she cannot find midline; resulting in a posterior and right lean. Patient exhibits poor posture in all positions and struggles to stay awake during therapy session. Treatment Diagnosis: decreased balance and strength  Prognosis: Good  Decision Making: Medium Complexity  PT Education: Goals;PT Role;General Safety;Plan of Care  Activity Tolerance  Activity Tolerance: Patient limited by fatigue  Activity Tolerance: 2 L O2 throughout session. Patient was very lethargic, often closing eyes and taking significant time to respond       Patient Diagnosis(es): The primary encounter diagnosis was Frequent falls. Diagnoses of General weakness, Debility, ESRD on hemodialysis ([facility] [address] and Generalized anxiety disorder with panic attacks were also pertinent to this visit. has a past medical history of Blind in both eyes, Cerebral artery occlusion with cerebral infarction ([facility] [address] CHF (congestive heart failure) ([facility] [address] Chronic kidney disease, Depression, Diabetes mellitus out of control ([facility] [address] Diabetes mellitus, type II ([facility] [address] Diabetic neuropathy associated with type 2 diabetes mellitus ([facility] [address] Generalized headaches, Hypertension, Infertility, Insomnia, Migraine headache, Mixed hyperlipidemia, Otitis media, Pelvic abscess in female, Pneumonia, Stroke [facility]), and Stroke ([facility] [address] has a past surgical history that includes Cervix surgery; eye surgery; Foot surgery (Right); Foot surgery (Bilateral); Foot surgery (Left); and IR TUNNELED CVC PLACE WO SQ PORT/PUMP > 5 YEARS (9/7/2021). Restrictions  Restrictions/Precautions  Restrictions/Precautions: Fall Risk,Contact Precautions (high fall risk)  Required Braces or Orthoses?: No  Position Activity Restriction  Other position/activity restrictions: 55 y.o. female who presents with the above complaint frequent falls. The patient states has fall issues. However over the past several days she has had increasing falls with subsequent bruising. She does live in a condo/apartment with her . She does indicate that he has indicated to her that it is becoming more difficult to care for her. 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 She has been admitted this facility for general weakness on November 2, 2021 and December 27, 2021. Patient diagnosis upon admission and discharge was hyperkalemia, general weakness, ESRD on dialysis. Vision/Hearing  Vision: Impaired (strokes affected vision + B macular degeneration, pt reports ~60% of vision in R eye, blind in L eye.  Limited color differential in R eye)  Hearing: Within functional limits       Subjective  General  Chart Reviewed: Yes  Patient assessed for rehabilitation services?: Yes  Response To Previous Treatment: Patient reporting fatigue but able to participate.   Family / Caregiver Present: No  Follows Commands: Within Functional Limits (lethargic; affected speed of command following)  General Comment  Comments: Patient eating lunch in semi-hannah position in bed  Subjective  Subjective: Patient states that she is very tired today, but will do her best in therapy; nursing aware of pt pain level and calling MD  Pain Screening  Patient Currently in Pain: Yes  Pain Assessment  Pain Level: 7 (R knee (1/10), L knee (6/10), neck (7/10) general body aches and pains (rates between 1-5/10))  Pain Type: Acute pain  Vital Signs  Patient Currently in Pain: Yes     Orientation  Orientation  Overall Orientation Status: Within Functional Limits (lethargic)     Social/Functional History  Social/Functional History  Lives With: Spouse (able to provide 24/7 assist (marital issues may affect this))  Type of Home: [facility] ground [facility]  Home Layout: One level  Home Access: Level entry  Bathroom Shower/Tub: Walk-in shower,Doors,Shower chair with back  Bathroom Toilet: Standard  Bathroom Equipment: Toilet raiser,Grab bars in shower,Hand-held shower  Bathroom Accessibility: Accessible  Home Equipment: [employer] (working on getting a rollator)  ADL Assistance: Needs assistance (spouse provides supervision for showers to make sure she gets all the soap off and assists with matching colors when dressing)  Homemaking Assistance: Needs assistance (pt assists with cooking, spouse does cooking and cleaning)  Active : No  Patient's  Info:  drives  Occupation: Unemployed (in process of getting disability)  IADL Comments: sleeps in flat bed  Additional Comments: Multiple falls prior to admission (4-5 falls per day), pt able to get herself up from most falls indep. Pt reports she feels that she has been falling more since change in her medication a few months ago. Objective     Observation/Palpation  Posture: Poor    AROM RLE (degrees)  RLE AROM: WNL  AROM LLE (degrees)  LLE AROM : WNL  Strength RLE  Comment: 3+/5 hip flexors, 4-/5 quad, 5/5 ankle dorsiflexors  Strength LLE  Comment: 3+/5 hip flexors, 4-/5 quad, 5/5 ankle dorsiflexors  Tone RLE  RLE Tone: Normotonic  Tone LLE  LLE Tone: Normotonic  Motor Control  Gross Motor? :  (patient struggled to find midline in all positions)  Sensation  Overall Sensation Status: Impaired (light touch diminished over ankles and feet)     Bed mobility  Bridging: Minimal assistance (required stabelization of knees and cues for positioning)  Rolling to Left: Maximum assistance  Rolling to Right: Maximum assistance  Supine to Sit: 2 Person assistance;Dependent/Total (maxA x2)  Sit to Supine: Dependent/Total;2 Person assistance (mod-maxA x2)  Scootin Person assistance;Dependent/Total (mod-max Ax2)     Transfers  Sit to Stand: 2 Person Assistance;Dependent/Total (mod-maxAx2 due to posterior and R lean)with RW  Stand to sit: Dependent/Total;2 Person Assistance (modAx2)  Ambulation  Ambulation?: No  Stairs/Curb  Stairs?: No     Balance  Posture: Poor  Sitting - Static: Poor (required maxAx1)  Sitting - Dynamic: Poor (maxAx2 to don/doff socks)  Standing - Static: Poor (maxAx2 due to posterior and R sided leaving)  Standing - Dynamic: Poor (maxAx2 due to posterior and R sided lean)    Plan   Plan  Times per week: 5-7x  Times per day: Daily  Safety Devices  Type of devices:  All fall risk precautions in place,Bed alarm in place,Call light within reach,Left in bed,Nurse notified  Restraints  Initially in place: No    AM-PAC Score  AM-PAC Inpatient Mobility Raw Score : 9 (22)  AM-PAC Inpatient T-Scale Score : 30.55 (22)  Mobility Inpatient CMS 0-100% Score: 81.38 (22)  Mobility Inpatient CMS G-Code Modifier : RIKI (01/05/22 1845)    Goals  Short term goals  Time Frame for Short term goals: discharge  Short term goal 1: Patient will be able to perform supine to sitting EOB transfer with modAx1. Short term goal 2: Patient will be able to sit upright with modA for 10 min in order to aid in IADLs such as eating and tolieting.   Short term goal 3: Patient will be able to stand for 1 minute with modA with RW  Short term goal 4: Patient will transfer bed to/from chair with mod A    Therapy Time   Individual Concurrent Group Co-treatment   Time In 1356         Time Out 1450         Minutes 54         Timed Code Treatment Minutes: 2000 Holiday Usman, SPT    Nneka Kathleen, PT     This evaluation/treatment was observed and supervised by Nneka Kathleen, [facility]

## 2022-01-06 ENCOUNTER — HOSPITAL ENCOUNTER (INPATIENT)
Age: 47
LOS: 15 days | Discharge: HOME | DRG: 194 | End: 2022-01-21
Attending: PHYSICAL MEDICINE & REHABILITATION | Admitting: PHYSICAL MEDICINE & REHABILITATION
Payer: COMMERCIAL

## 2022-01-06 VITALS
WEIGHT: 181.22 LBS | SYSTOLIC BLOOD PRESSURE: 136 MMHG | TEMPERATURE: 97.7 F | DIASTOLIC BLOOD PRESSURE: 81 MMHG | OXYGEN SATURATION: 92 % | RESPIRATION RATE: 16 BRPM | HEART RATE: 82 BPM | BODY MASS INDEX: 28.44 KG/M2 | HEIGHT: 67 IN

## 2022-01-06 PROBLEM — R29.6 RECURRENT FALLS: Status: ACTIVE | Noted: 2022-01-06

## 2022-01-06 LAB
ANION GAP SERPL CALCULATED.3IONS-SCNC: 16 MMOL/L (ref 3–16)
BASOPHILS ABSOLUTE: 0 K/UL (ref 0–0.2)
BASOPHILS RELATIVE PERCENT: 0.4 %
BUN BLDV-MCNC: 26 MG/DL (ref 7–20)
C. DIFFICILE TOXIN MOLECULAR: NORMAL
CALCIUM SERPL-MCNC: 8.7 MG/DL (ref 8.3–10.6)
CHLORIDE BLD-SCNC: 95 MMOL/L (ref 99–110)
CO2: 22 MMOL/L (ref 21–32)
CREAT SERPL-MCNC: 5.5 MG/DL (ref 0.6–1.1)
EOSINOPHILS ABSOLUTE: 0.2 K/UL (ref 0–0.6)
EOSINOPHILS RELATIVE PERCENT: 1.7 %
ESTIMATED AVERAGE GLUCOSE: 145.6 MG/DL
GFR AFRICAN AMERICAN: 10
GFR NON-AFRICAN AMERICAN: 8
GLUCOSE BLD-MCNC: 101 MG/DL (ref 70–99)
GLUCOSE BLD-MCNC: 124 MG/DL (ref 70–99)
GLUCOSE BLD-MCNC: 138 MG/DL (ref 70–99)
GLUCOSE BLD-MCNC: 151 MG/DL (ref 70–99)
GLUCOSE BLD-MCNC: 152 MG/DL (ref 70–99)
HBA1C MFR BLD: 6.7 %
HCT VFR BLD CALC: 33.3 % (ref 36–48)
HEMOGLOBIN: 10.6 G/DL (ref 12–16)
HEPATITIS B CORE TOTAL ANTIBODY: NEGATIVE
LYMPHOCYTES ABSOLUTE: 2 K/UL (ref 1–5.1)
LYMPHOCYTES RELATIVE PERCENT: 17.4 %
MCH RBC QN AUTO: 26 PG (ref 26–34)
MCHC RBC AUTO-ENTMCNC: 32 G/DL (ref 31–36)
MCV RBC AUTO: 81.4 FL (ref 80–100)
MONOCYTES ABSOLUTE: 1.3 K/UL (ref 0–1.3)
MONOCYTES RELATIVE PERCENT: 11.7 %
NEUTROPHILS ABSOLUTE: 7.8 K/UL (ref 1.7–7.7)
NEUTROPHILS RELATIVE PERCENT: 68.8 %
PDW BLD-RTO: 17.1 % (ref 12.4–15.4)
PERFORMED ON: ABNORMAL
PLATELET # BLD: 209 K/UL (ref 135–450)
PMV BLD AUTO: 8.9 FL (ref 5–10.5)
POTASSIUM SERPL-SCNC: 4.3 MMOL/L (ref 3.5–5.1)
RBC # BLD: 4.09 M/UL (ref 4–5.2)
SODIUM BLD-SCNC: 133 MMOL/L (ref 136–145)
WBC # BLD: 11.3 K/UL (ref 4–11)

## 2022-01-06 PROCEDURE — 1280000000 HC REHAB R&B

## 2022-01-06 PROCEDURE — 6360000002 HC RX W HCPCS: Performed by: INTERNAL MEDICINE

## 2022-01-06 PROCEDURE — 6370000000 HC RX 637 (ALT 250 FOR IP): Performed by: PHYSICAL MEDICINE & REHABILITATION

## 2022-01-06 PROCEDURE — 2580000003 HC RX 258: Performed by: PHYSICAL MEDICINE & REHABILITATION

## 2022-01-06 PROCEDURE — 2700000000 HC OXYGEN THERAPY PER DAY

## 2022-01-06 PROCEDURE — 36415 COLL VENOUS BLD VENIPUNCTURE: CPT

## 2022-01-06 PROCEDURE — 6370000000 HC RX 637 (ALT 250 FOR IP): Performed by: INTERNAL MEDICINE

## 2022-01-06 PROCEDURE — 6360000002 HC RX W HCPCS: Performed by: PHYSICAL MEDICINE & REHABILITATION

## 2022-01-06 PROCEDURE — 2580000003 HC RX 258: Performed by: INTERNAL MEDICINE

## 2022-01-06 PROCEDURE — 85025 COMPLETE CBC W/AUTO DIFF WBC: CPT

## 2022-01-06 PROCEDURE — 80048 BASIC METABOLIC PNL TOTAL CA: CPT

## 2022-01-06 PROCEDURE — 94640 AIRWAY INHALATION TREATMENT: CPT

## 2022-01-06 PROCEDURE — 94761 N-INVAS EAR/PLS OXIMETRY MLT: CPT

## 2022-01-06 RX ORDER — DEXTROSE MONOHYDRATE 25 G/50ML
12.5 INJECTION, SOLUTION INTRAVENOUS PRN
Status: CANCELLED | OUTPATIENT
Start: 2022-01-06

## 2022-01-06 RX ORDER — INSULIN LISPRO 100 [IU]/ML
0-12 INJECTION, SOLUTION INTRAVENOUS; SUBCUTANEOUS
Status: CANCELLED | OUTPATIENT
Start: 2022-01-06

## 2022-01-06 RX ORDER — HEPARIN SODIUM 5000 [USP'U]/ML
5000 INJECTION, SOLUTION INTRAVENOUS; SUBCUTANEOUS EVERY 8 HOURS SCHEDULED
Status: DISCONTINUED | OUTPATIENT
Start: 2022-01-06 | End: 2022-01-21 | Stop reason: HOSPADM

## 2022-01-06 RX ORDER — ONDANSETRON 4 MG/1
4 TABLET, ORALLY DISINTEGRATING ORAL EVERY 8 HOURS PRN
Status: DISCONTINUED | OUTPATIENT
Start: 2022-01-06 | End: 2022-01-21 | Stop reason: HOSPADM

## 2022-01-06 RX ORDER — CLONIDINE 0.2 MG/24H
1 PATCH, EXTENDED RELEASE TRANSDERMAL WEEKLY
Status: CANCELLED | OUTPATIENT
Start: 2022-01-12

## 2022-01-06 RX ORDER — PROPRANOLOL HYDROCHLORIDE 80 MG/1
80 CAPSULE, EXTENDED RELEASE ORAL DAILY
Status: CANCELLED | OUTPATIENT
Start: 2022-01-06

## 2022-01-06 RX ORDER — INSULIN LISPRO 100 [IU]/ML
0-6 INJECTION, SOLUTION INTRAVENOUS; SUBCUTANEOUS NIGHTLY
Status: DISCONTINUED | OUTPATIENT
Start: 2022-01-06 | End: 2022-01-21 | Stop reason: HOSPADM

## 2022-01-06 RX ORDER — HEPARIN SODIUM 1000 [USP'U]/ML
3200 INJECTION, SOLUTION INTRAVENOUS; SUBCUTANEOUS PRN
Status: CANCELLED | OUTPATIENT
Start: 2022-01-06

## 2022-01-06 RX ORDER — BUPROPION HYDROCHLORIDE 150 MG/1
150 TABLET ORAL EVERY MORNING
Status: CANCELLED | OUTPATIENT
Start: 2022-01-06

## 2022-01-06 RX ORDER — ASPIRIN 81 MG/1
81 TABLET ORAL DAILY
Status: DISCONTINUED | OUTPATIENT
Start: 2022-01-07 | End: 2022-01-21 | Stop reason: HOSPADM

## 2022-01-06 RX ORDER — HEPARIN SODIUM 5000 [USP'U]/ML
5000 INJECTION, SOLUTION INTRAVENOUS; SUBCUTANEOUS EVERY 8 HOURS SCHEDULED
Status: CANCELLED | OUTPATIENT
Start: 2022-01-06

## 2022-01-06 RX ORDER — LISINOPRIL 20 MG/1
40 TABLET ORAL DAILY
Status: CANCELLED | OUTPATIENT
Start: 2022-01-06

## 2022-01-06 RX ORDER — AMLODIPINE BESYLATE 5 MG/1
10 TABLET ORAL DAILY
Status: CANCELLED | OUTPATIENT
Start: 2022-01-06

## 2022-01-06 RX ORDER — HYDROXYZINE HYDROCHLORIDE 25 MG/1
25 TABLET, FILM COATED ORAL 4 TIMES DAILY PRN
Status: CANCELLED | OUTPATIENT
Start: 2022-01-06

## 2022-01-06 RX ORDER — LISINOPRIL 20 MG/1
40 TABLET ORAL DAILY
Status: DISCONTINUED | OUTPATIENT
Start: 2022-01-07 | End: 2022-01-14

## 2022-01-06 RX ORDER — TRAMADOL HYDROCHLORIDE 50 MG/1
50 TABLET ORAL EVERY 6 HOURS PRN
Status: CANCELLED | OUTPATIENT
Start: 2022-01-06

## 2022-01-06 RX ORDER — TRAZODONE HYDROCHLORIDE 50 MG/1
50 TABLET ORAL NIGHTLY PRN
Status: CANCELLED | OUTPATIENT
Start: 2022-01-06

## 2022-01-06 RX ORDER — SODIUM CHLORIDE 0.9 % (FLUSH) 0.9 %
10 SYRINGE (ML) INJECTION EVERY 12 HOURS SCHEDULED
Status: CANCELLED | OUTPATIENT
Start: 2022-01-06

## 2022-01-06 RX ORDER — NICOTINE POLACRILEX 4 MG
15 LOZENGE BUCCAL PRN
Status: DISCONTINUED | OUTPATIENT
Start: 2022-01-06 | End: 2022-01-21 | Stop reason: HOSPADM

## 2022-01-06 RX ORDER — NICOTINE POLACRILEX 4 MG
15 LOZENGE BUCCAL PRN
Status: CANCELLED | OUTPATIENT
Start: 2022-01-06

## 2022-01-06 RX ORDER — DEXTROSE MONOHYDRATE 50 MG/ML
100 INJECTION, SOLUTION INTRAVENOUS PRN
Status: CANCELLED | OUTPATIENT
Start: 2022-01-06

## 2022-01-06 RX ORDER — ACETAMINOPHEN 325 MG/1
650 TABLET ORAL EVERY 4 HOURS PRN
Status: DISCONTINUED | OUTPATIENT
Start: 2022-01-06 | End: 2022-01-21 | Stop reason: HOSPADM

## 2022-01-06 RX ORDER — TRAMADOL HYDROCHLORIDE 50 MG/1
50 TABLET ORAL EVERY 6 HOURS PRN
Status: DISCONTINUED | OUTPATIENT
Start: 2022-01-06 | End: 2022-01-21 | Stop reason: HOSPADM

## 2022-01-06 RX ORDER — SODIUM CHLORIDE 0.9 % (FLUSH) 0.9 %
10 SYRINGE (ML) INJECTION PRN
Status: CANCELLED | OUTPATIENT
Start: 2022-01-06

## 2022-01-06 RX ORDER — SODIUM CHLORIDE 0.9 % (FLUSH) 0.9 %
10 SYRINGE (ML) INJECTION EVERY 12 HOURS SCHEDULED
Status: DISCONTINUED | OUTPATIENT
Start: 2022-01-06 | End: 2022-01-09

## 2022-01-06 RX ORDER — ATORVASTATIN CALCIUM 40 MG/1
40 TABLET, FILM COATED ORAL NIGHTLY
Status: DISCONTINUED | OUTPATIENT
Start: 2022-01-06 | End: 2022-01-21 | Stop reason: HOSPADM

## 2022-01-06 RX ORDER — BENZONATATE 100 MG/1
200 CAPSULE ORAL EVERY 8 HOURS PRN
Status: DISCONTINUED | OUTPATIENT
Start: 2022-01-06 | End: 2022-01-21 | Stop reason: HOSPADM

## 2022-01-06 RX ORDER — CLONIDINE 0.2 MG/24H
1 PATCH, EXTENDED RELEASE TRANSDERMAL WEEKLY
Status: DISCONTINUED | OUTPATIENT
Start: 2022-01-12 | End: 2022-01-18

## 2022-01-06 RX ORDER — SODIUM CHLORIDE 0.9 % (FLUSH) 0.9 %
10 SYRINGE (ML) INJECTION PRN
Status: DISCONTINUED | OUTPATIENT
Start: 2022-01-06 | End: 2022-01-21 | Stop reason: HOSPADM

## 2022-01-06 RX ORDER — HYDRALAZINE HYDROCHLORIDE 25 MG/1
50 TABLET, FILM COATED ORAL EVERY 8 HOURS PRN
Status: CANCELLED | OUTPATIENT
Start: 2022-01-06

## 2022-01-06 RX ORDER — FUROSEMIDE 20 MG/1
10 TABLET ORAL DAILY
Status: DISCONTINUED | OUTPATIENT
Start: 2022-01-07 | End: 2022-01-17

## 2022-01-06 RX ORDER — SPIRONOLACTONE 25 MG/1
50 TABLET ORAL DAILY
Status: CANCELLED | OUTPATIENT
Start: 2022-01-06

## 2022-01-06 RX ORDER — HYDROXYZINE HYDROCHLORIDE 25 MG/1
25 TABLET, FILM COATED ORAL 4 TIMES DAILY PRN
Status: DISCONTINUED | OUTPATIENT
Start: 2022-01-06 | End: 2022-01-21 | Stop reason: HOSPADM

## 2022-01-06 RX ORDER — ACETAMINOPHEN 325 MG/1
650 TABLET ORAL EVERY 4 HOURS PRN
Status: CANCELLED | OUTPATIENT
Start: 2022-01-06

## 2022-01-06 RX ORDER — ALPRAZOLAM 0.5 MG/1
0.75 TABLET ORAL EVERY 6 HOURS SCHEDULED
Status: CANCELLED | OUTPATIENT
Start: 2022-01-06

## 2022-01-06 RX ORDER — AMLODIPINE BESYLATE 5 MG/1
10 TABLET ORAL DAILY
Status: DISCONTINUED | OUTPATIENT
Start: 2022-01-07 | End: 2022-01-17

## 2022-01-06 RX ORDER — PREGABALIN 75 MG/1
75 CAPSULE ORAL NIGHTLY
Status: DISCONTINUED | OUTPATIENT
Start: 2022-01-06 | End: 2022-01-21 | Stop reason: HOSPADM

## 2022-01-06 RX ORDER — TRAZODONE HYDROCHLORIDE 50 MG/1
50 TABLET ORAL NIGHTLY PRN
Status: DISCONTINUED | OUTPATIENT
Start: 2022-01-06 | End: 2022-01-21 | Stop reason: HOSPADM

## 2022-01-06 RX ORDER — DEXTROSE MONOHYDRATE 25 G/50ML
12.5 INJECTION, SOLUTION INTRAVENOUS PRN
Status: DISCONTINUED | OUTPATIENT
Start: 2022-01-06 | End: 2022-01-21 | Stop reason: HOSPADM

## 2022-01-06 RX ORDER — ALBUTEROL SULFATE 90 UG/1
2 AEROSOL, METERED RESPIRATORY (INHALATION) EVERY 4 HOURS PRN
Status: DISCONTINUED | OUTPATIENT
Start: 2022-01-06 | End: 2022-01-08

## 2022-01-06 RX ORDER — HYDRALAZINE HYDROCHLORIDE 25 MG/1
50 TABLET, FILM COATED ORAL EVERY 8 HOURS PRN
Status: DISCONTINUED | OUTPATIENT
Start: 2022-01-06 | End: 2022-01-21 | Stop reason: HOSPADM

## 2022-01-06 RX ORDER — ONDANSETRON 4 MG/1
4 TABLET, ORALLY DISINTEGRATING ORAL EVERY 8 HOURS PRN
Status: CANCELLED | OUTPATIENT
Start: 2022-01-06

## 2022-01-06 RX ORDER — INSULIN LISPRO 100 [IU]/ML
0-6 INJECTION, SOLUTION INTRAVENOUS; SUBCUTANEOUS NIGHTLY
Status: CANCELLED | OUTPATIENT
Start: 2022-01-06

## 2022-01-06 RX ORDER — SPIRONOLACTONE 25 MG/1
50 TABLET ORAL DAILY
Status: DISCONTINUED | OUTPATIENT
Start: 2022-01-07 | End: 2022-01-17

## 2022-01-06 RX ORDER — DEXTROSE MONOHYDRATE 50 MG/ML
100 INJECTION, SOLUTION INTRAVENOUS PRN
Status: DISCONTINUED | OUTPATIENT
Start: 2022-01-06 | End: 2022-01-21 | Stop reason: HOSPADM

## 2022-01-06 RX ORDER — ATORVASTATIN CALCIUM 40 MG/1
40 TABLET, FILM COATED ORAL NIGHTLY
Status: CANCELLED | OUTPATIENT
Start: 2022-01-06

## 2022-01-06 RX ORDER — BUPROPION HYDROCHLORIDE 150 MG/1
150 TABLET ORAL EVERY MORNING
Status: DISCONTINUED | OUTPATIENT
Start: 2022-01-07 | End: 2022-01-21 | Stop reason: HOSPADM

## 2022-01-06 RX ORDER — BENZONATATE 100 MG/1
200 CAPSULE ORAL EVERY 8 HOURS PRN
Status: CANCELLED | OUTPATIENT
Start: 2022-01-06

## 2022-01-06 RX ORDER — ASPIRIN 81 MG/1
81 TABLET ORAL DAILY
Status: CANCELLED | OUTPATIENT
Start: 2022-01-06

## 2022-01-06 RX ORDER — ALBUTEROL SULFATE 90 UG/1
2 AEROSOL, METERED RESPIRATORY (INHALATION) EVERY 4 HOURS PRN
Status: CANCELLED | OUTPATIENT
Start: 2022-01-06

## 2022-01-06 RX ORDER — INSULIN LISPRO 100 [IU]/ML
0-12 INJECTION, SOLUTION INTRAVENOUS; SUBCUTANEOUS
Status: DISCONTINUED | OUTPATIENT
Start: 2022-01-07 | End: 2022-01-21 | Stop reason: HOSPADM

## 2022-01-06 RX ORDER — PROPRANOLOL HYDROCHLORIDE 80 MG/1
80 CAPSULE, EXTENDED RELEASE ORAL DAILY
Status: DISCONTINUED | OUTPATIENT
Start: 2022-01-07 | End: 2022-01-21 | Stop reason: HOSPADM

## 2022-01-06 RX ORDER — PREGABALIN 75 MG/1
75 CAPSULE ORAL NIGHTLY
Status: CANCELLED | OUTPATIENT
Start: 2022-01-06

## 2022-01-06 RX ORDER — HEPARIN SODIUM 1000 [USP'U]/ML
3600 INJECTION, SOLUTION INTRAVENOUS; SUBCUTANEOUS PRN
Status: DISCONTINUED | OUTPATIENT
Start: 2022-01-06 | End: 2022-01-21 | Stop reason: HOSPADM

## 2022-01-06 RX ORDER — FUROSEMIDE 20 MG/1
10 TABLET ORAL DAILY
Status: CANCELLED | OUTPATIENT
Start: 2022-01-06

## 2022-01-06 RX ADMIN — PROPRANOLOL HYDROCHLORIDE 80 MG: 80 CAPSULE, EXTENDED RELEASE ORAL at 09:04

## 2022-01-06 RX ADMIN — TIOTROPIUM BROMIDE AND OLODATEROL 2 PUFF: 3.124; 2.736 SPRAY, METERED RESPIRATORY (INHALATION) at 08:32

## 2022-01-06 RX ADMIN — TRAZODONE HYDROCHLORIDE 50 MG: 50 TABLET ORAL at 20:46

## 2022-01-06 RX ADMIN — LISINOPRIL 40 MG: 20 TABLET ORAL at 09:04

## 2022-01-06 RX ADMIN — PREGABALIN 75 MG: 75 CAPSULE ORAL at 20:46

## 2022-01-06 RX ADMIN — SODIUM CHLORIDE, PRESERVATIVE FREE 10 ML: 5 INJECTION INTRAVENOUS at 20:47

## 2022-01-06 RX ADMIN — ASPIRIN 81 MG: 81 TABLET, COATED ORAL at 09:04

## 2022-01-06 RX ADMIN — SPIRONOLACTONE 50 MG: 25 TABLET ORAL at 09:04

## 2022-01-06 RX ADMIN — BUPROPION HYDROCHLORIDE 150 MG: 150 TABLET, EXTENDED RELEASE ORAL at 09:04

## 2022-01-06 RX ADMIN — FUROSEMIDE 10 MG: 20 TABLET ORAL at 09:04

## 2022-01-06 RX ADMIN — HEPARIN SODIUM 5000 UNITS: 5000 INJECTION INTRAVENOUS; SUBCUTANEOUS at 22:21

## 2022-01-06 RX ADMIN — HEPARIN SODIUM 5000 UNITS: 5000 INJECTION INTRAVENOUS; SUBCUTANEOUS at 14:55

## 2022-01-06 RX ADMIN — ATORVASTATIN CALCIUM 40 MG: 40 TABLET, FILM COATED ORAL at 20:46

## 2022-01-06 RX ADMIN — HEPARIN SODIUM 5000 UNITS: 5000 INJECTION INTRAVENOUS; SUBCUTANEOUS at 06:14

## 2022-01-06 RX ADMIN — INSULIN GLARGINE 20 UNITS: 100 INJECTION, SOLUTION SUBCUTANEOUS at 09:05

## 2022-01-06 RX ADMIN — INSULIN LISPRO 2 UNITS: 100 INJECTION, SOLUTION INTRAVENOUS; SUBCUTANEOUS at 12:21

## 2022-01-06 RX ADMIN — AMLODIPINE BESYLATE 10 MG: 5 TABLET ORAL at 09:04

## 2022-01-06 RX ADMIN — SODIUM CHLORIDE, PRESERVATIVE FREE 10 ML: 5 INJECTION INTRAVENOUS at 09:05

## 2022-01-06 RX ADMIN — ALPRAZOLAM 0.75 MG: 0.5 TABLET ORAL at 06:14

## 2022-01-06 ASSESSMENT — PAIN SCALES - GENERAL
PAINLEVEL_OUTOF10: 0

## 2022-01-06 NOTE — FLOWSHEET NOTE
01/06/22 0855   Vital Signs   Temp 97.7 °F (36.5 °C)   Temp Source Oral   Pulse 73   Heart Rate Source Monitor   Resp 20   BP (!) 150/78   MAP (mmHg) 102   Patient Position Supine   Level of Consciousness Alert (0)   MEWS Score 1   Patient Currently in Pain Denies   Pain Assessment   Pain Assessment 0-10   Pain Level 0   Patient's Stated Pain Goal No pain   Oxygen Therapy   SpO2 92 %   Pulse Oximeter Device Mode Intermittent   Pulse Oximeter Device Location Finger   O2 Device Nasal cannula   O2 Flow Rate (L/min) 2 L/min     Shift assessment complete. VSS. Pt is resting comfortably in bed. Pt has no major complaints this am. POC discussed with patient and patient states understanding and is in agreement with plan. ARU vs SNF @d/c. Call light and bedside table within reach. No further needs expressed at this time.

## 2022-01-06 NOTE — PROGRESS NOTES
MD Kim Vazquez MD Barney Reel, MD                Office: [phone]                      Fax: [phone] INPATIENT PROGRESS NOTE:     PATIENT NAME: Desirae Robertson  : 1975  MRN: [id number]         IMPRESSION / RECOMMENDATION:      Admitted for:  Debility [R53.81]  General weakness [R53.1]  ESRD (end stage renal disease) ([facility] [N18.6]  ESRD on hemodialysis ([facility] [N18.6, Z99.2]  Frequent falls [R29.6]      1. ESRD on iHD: MWF , follows w/ Dr. Filomena Bradshaw  - outpt HD center: [facility], Dr Filomena Bradshaw   - recently started HD in 2021, during admission w/ ATN w/ KINZA on CKD-4, was lost for f/u,), had acute hypoxic respiratory failure, pneumonia - needing intubation    - missed HD x2  - encouraged compliance as if her solutes are better control, she may feel overall better    - so HD needed on 2022- Wednesday     - next HD Friday     2. Hypertension/Volume Status:  - BP HTN - hx of resistant HTN - slightly uncontrolled - f/u after HD + resume home meds   - EDW reported 88  -> 83 kg currently - so might be her new DW  - Na - chronic hypoNatremia - f/u w/ HD      3. Electrolytes/acid-base:  K: WNL  High AG metabolic acidosis: mild - f/u w/ HD     4. Anemia of renal failure: at goal  - RIA: w/ HD    5. BMD:  - Phos: follow iPTH outpt    D/W Pt, Dr Regla Max as considering ARU      : Other supportive care :   - Check daily renal function panel with electrolytes-phosphorus  - Strict monitoring of I/Os, daily weight  - Renal feeds/diet  - Current medications reviewed. - Nephrotoxic medications have been discontinued. - Dose adjusted and appropriate. - Dose meds for eGFR <15 mL/min/1.73m2.    - Avoid heavy opioids due to renal failure - may use very low dose dilaudid / fentanyl with close monitoring of CNS and respiratory depression.            Other Problems:  Hospital Problems           Last Modified POA    * (Principal) ESRD (end stage renal disease) ([facility] 1/4/2022 Yes    Mixed hyperlipidemia 1/4/2022 Yes    Overview Signed 8/8/2011  1:19 PM by Shabbir Coreas MD     mixed         Anemia (Chronic) 1/4/2022 Yes    HTN (hypertension), benign 1/4/2022 Yes    DM (diabetes mellitus), secondary, uncontrolled, w/neurologic complic ([facility] 8/5/0399 Yes    Polyneuropathy due to type 2 diabetes mellitus ([facility] 1/4/2022 Yes          Please refer to orders. Multiple complex problems. High risk  Discussed with patient, and treatment team    Thank you for allowing me to participate in this patient's care. Please do not hesitate to contact me with any questions/concerns. We will follow along with you. Michael Uribe MD  [facility] [address] [place] [address] [place]: [phone] or Via [employer]  Fax: [phone]    Time spent  ~ 35 minutes that included face-to-face meeting/discussion with patient, and treatment team (including primary/referring team and other consultants; included coordination of care with the treatment team; and review of patient's electronic medical records and ordering appropriates tests.       ===========================================  ===========================================    Subjective:  Seen resting in bed  No new complaints   HD went well yesterday   1L UF   ARU is being consider     Past medical, surgical, social and family medial history reviewed by me:  MEDICATIONS reviewed by me:  Prior to Admission Medications:  No current facility-administered medications on file prior to encounter.      Current Outpatient Medications on File Prior to Encounter   Medication Sig Dispense Refill    pregabalin (LYRICA) 75 MG capsule TAKE ONE CAPSULE BY MOUTH EVERY NIGHT 30 capsule 0    albuterol (PROVENTIL) 2.5 MG/0.5ML NEBU nebulizer solution Take 0.5 mLs by nebulization every 6 hours as needed for Wheezing or Shortness of Breath 20 mL 2    furosemide (LASIX) 40 MG tablet Take 40 mg by mouth daily       methyl salicylate-menthol (RANDI LEBLANC GREASELESS) 10-15 % CREA Apply topically 3 times daily as needed for Pain 57 g 0    buPROPion (WELLBUTRIN XL) 150 MG extended release tablet Take 1 tablet by mouth every morning 30 tablet 1    spironolactone (ALDACTONE) 50 MG tablet TAKE ONE TABLET BY MOUTH DAILY 30 tablet 0    propranolol (INDERAL LA) 80 MG extended release capsule TAKE ONE CAPSULE BY MOUTH EVERY MORNING 30 capsule 0    amLODIPine (NORVASC) 10 MG tablet       hydrOXYzine (ATARAX) 25 MG tablet       benzonatate (TESSALON) 200 MG capsule Take 1 capsule by mouth every 8 hours as needed for Cough 15 capsule 1    albuterol sulfate  (90 Base) MCG/ACT inhaler Inhale 2 puffs into the lungs every 6 hours as needed for Wheezing or Shortness of Breath 18 g 1    Dulaglutide 1.5 MG/0.5ML SOPN Inject 1.5 mg into the skin once a week 4 pen 1    cloNIDine (CATAPRES) 0.2 MG/24HR PTWK Place 1 patch onto the skin once a week 4 patch 2    Misc.  Devices (PULSE OXIMETER) MISC 1 each by Does not apply route every 6-8 hours as needed (shortness of breath) 1 each 0    Nasal Dilators (BREATHE RIGHT EXTRA STRENGTH) STRP 1 strip by Does not apply route nightly as needed (nasal congestion) 8 strip 2    albuterol sulfate HFA (PROVENTIL HFA) 108 (90 Base) MCG/ACT inhaler Inhale 2 puffs into the lungs every 4 hours as needed for Wheezing 18 g 0    insulin glargine (LANTUS SOLOSTAR) 100 UNIT/ML injection pen Inject 10 Units into the skin every morning       lisinopril (PRINIVIL;ZESTRIL) 40 MG tablet Take 40 mg by mouth daily      glycopyrrolate-formoterol (BEVESPI AEROSPHERE) 9-4.8 MCG/ACT AERO Inhale 2 puffs into the lungs 2 times daily 10.7 g 2    atorvastatin (LIPITOR) 40 MG tablet Take 1 tablet by mouth nightly 30 tablet 3    Insulin Pen Needle 29G X 12.7MM MISC 1 each by Does not apply route daily 100 each 5    aspirin 81 MG EC tablet Take 1 tablet by mouth daily 30 tablet 3    insulin lispro, 1 Unit Dial, 100 UNIT/ML SOPN Inject 0-6 Units into the skin 3 times daily (with meals) **Corrective Low Dose Algorithm**  Glucose: Dose:               No Insulin  140-199 1 Unit  200-249 2 Units  250-299 3 Units  300-349 4 Units  350-399 5 Units  Over 399 6 Units 3 pen 0    Heat Wraps (THERMACARE BACK/HIP) MISC 1 each by Does not apply route daily as needed (back pain) 3 each 5    fluvoxaMINE (LUVOX) 100 MG tablet Take 1 tablet by mouth nightly 30 tablet 1    glucose (GLUTOSE) 40 % GEL Take 37.5 mLs by mouth as needed (low blood sugar) 45 g 1       Medications Prior to Admission: pregabalin (LYRICA) 75 MG capsule, TAKE ONE CAPSULE BY MOUTH EVERY NIGHT  albuterol (PROVENTIL) 2.5 MG/0.5ML NEBU nebulizer solution, Take 0.5 mLs by nebulization every 6 hours as needed for Wheezing or Shortness of Breath  furosemide (LASIX) 40 MG tablet, Take 40 mg by mouth daily   methyl salicylate-menthol (RANDI LEBLANC GREASELESS) 10-15 % CREA, Apply topically 3 times daily as needed for Pain  buPROPion (WELLBUTRIN XL) 150 MG extended release tablet, Take 1 tablet by mouth every morning  spironolactone (ALDACTONE) 50 MG tablet, TAKE ONE TABLET BY MOUTH DAILY  propranolol (INDERAL LA) 80 MG extended release capsule, TAKE ONE CAPSULE BY MOUTH EVERY MORNING  amLODIPine (NORVASC) 10 MG tablet,   hydrOXYzine (ATARAX) 25 MG tablet,   benzonatate (TESSALON) 200 MG capsule, Take 1 capsule by mouth every 8 hours as needed for Cough  albuterol sulfate  (90 Base) MCG/ACT inhaler, Inhale 2 puffs into the lungs every 6 hours as needed for Wheezing or Shortness of Breath  Dulaglutide 1.5 MG/0.5ML SOPN, Inject 1.5 mg into the skin once a week  cloNIDine (CATAPRES) 0.2 MG/24HR PTWK, Place 1 patch onto the skin once a week  Misc.  Devices (PULSE OXIMETER) MISC, 1 each by Does not apply route every 6-8 hours as needed (shortness of breath)  Nasal Dilators (BREATHE RIGHT EXTRA STRENGTH) STRP, 1 strip by Does not apply route nightly as needed (nasal congestion)  albuterol sulfate HFA (PROVENTIL HFA) 108 (90 Base) MCG/ACT inhaler, Inhale 2 puffs into the lungs every 4 hours as needed for Wheezing  insulin glargine (LANTUS SOLOSTAR) 100 UNIT/ML injection pen, Inject 10 Units into the skin every morning   lisinopril (PRINIVIL;ZESTRIL) 40 MG tablet, Take 40 mg by mouth daily  glycopyrrolate-formoterol (BEVESPI AEROSPHERE) 9-4.8 MCG/ACT AERO, Inhale 2 puffs into the lungs 2 times daily  atorvastatin (LIPITOR) 40 MG tablet, Take 1 tablet by mouth nightly  Insulin Pen Needle 29G X 12.7MM MISC, 1 each by Does not apply route daily  aspirin 81 MG EC tablet, Take 1 tablet by mouth daily  insulin lispro, 1 Unit Dial, 100 UNIT/ML SOPN, Inject 0-6 Units into the skin 3 times daily (with meals) **Corrective Low Dose Algorithm** Glucose: Dose:              No Insulin 140-199 1 Unit 200-249 2 Units 250-299 3 Units 300-349 4 Units 350-399 5 Units Over 399 6 Units  Heat Wraps (THERMACARE BACK/HIP) MISC, 1 each by Does not apply route daily as needed (back pain)  fluvoxaMINE (LUVOX) 100 MG tablet, Take 1 tablet by mouth nightly  glucose (GLUTOSE) 40 % GEL, Take 37.5 mLs by mouth as needed (low blood sugar)     Inpatient Medications:  Scheduled Meds:   aspirin  81 mg Oral Daily    atorvastatin  40 mg Oral Nightly    tiotropium-olodaterol  2 puff Inhalation Daily    insulin glargine  20 Units SubCUTAneous QAM    lisinopril  40 mg Oral Daily    cloNIDine  1 patch TransDERmal Weekly    amLODIPine  10 mg Oral Daily    spironolactone  50 mg Oral Daily    propranolol  80 mg Oral Daily    furosemide  10 mg Oral Daily    buPROPion  150 mg Oral QAM    pregabalin  75 mg Oral Nightly    sodium chloride flush  10 mL IntraVENous 2 times per day    heparin (porcine)  5,000 Units SubCUTAneous 3 times per day    insulin lispro  0-12 Units SubCUTAneous TID WC    insulin lispro  0-6 Units SubCUTAneous Nightly    ALPRAZolam  0.75 mg Oral 4 times per day     Continuous Infusions:   sodium chloride      dextrose       PRN Meds:.albuterol sulfate HFA, hydrOXYzine, benzonatate, sodium chloride flush, sodium chloride, ondansetron **OR** ondansetron, acetaminophen **OR** acetaminophen, hydrALAZINE, sodium chloride, glucose, dextrose, glucagon (rDNA), dextrose, heparin (porcine)       REVIEW OF SYSTEMS:  As mentioned in HPI and CC              PHYSICAL EXAM:  Patient Vitals for the past 24 hrs:   BP Temp Temp src Pulse Resp SpO2 Weight   01/06/22 1027 -- -- -- 87 -- -- --   01/06/22 0855 (!) 150/78 97.7 °F (36.5 °C) Oral 73 20 92 % --   01/06/22 0832 -- -- -- -- 20 96 % --   01/06/22 0217 -- -- -- -- -- 95 % --   01/06/22 0200 (!) 145/78 97.6 °F (36.4 °C) Oral 86 20 (!) 86 % --   01/05/22 2000 130/79 98.7 °F (37.1 °C) Oral 95 18 94 % --   01/05/22 1825 -- -- -- 94 -- -- --   01/05/22 1607 123/70 98.9 °F (37.2 °C) Oral 100 20 92 % --   01/05/22 1337 (!) 164/77 98.3 °F (36.8 °C) Axillary 98 20 95 % --   01/05/22 1229 (!) 150/83 97.9 °F (36.6 °C) -- 90 20 -- 181 lb 3.5 oz (82.2 kg)       Intake/Output Summary (Last 24 hours) at 1/6/2022 1124  Last data filed at 1/6/2022 3306  Gross per 24 hour   Intake 800 ml   Output 2150 ml   Net -1350 ml       Physical Exam  Vitals reviewed. Constitutional:       General: She is not in acute distress. Appearance: Normal appearance. She is ill-appearing. Comments: Obese body habitus   HENT:      Head: Normocephalic and atraumatic. Right Ear: External ear normal.      Left Ear: External ear normal.      Nose: Nose normal.      Mouth/Throat:      Mouth: Mucous membranes are moist. Mucous membranes are not dry. Eyes:      General: No scleral icterus. Conjunctiva/sclera: Conjunctivae normal.   Neck:      Thyroid: No thyroid mass. Vascular: No JVD. Trachea: Trachea normal.   Cardiovascular:      Rate and Rhythm: Normal rate and regular rhythm.       Heart sounds: S1 normal and S2 normal.   Pulmonary:      Effort: Pulmonary effort is normal. No respiratory distress. Breath sounds: Normal breath sounds. Abdominal:      General: Bowel sounds are normal. There is no distension. Palpations: Abdomen is soft. Musculoskeletal:         General: Swelling (mild) present. No deformity. Cervical back: Normal range of motion and neck supple. Skin:     General: Skin is warm and dry. Coloration: Skin is not jaundiced. Neurological:      Mental Status: She is alert and oriented to person, place, and time. Mental status is at baseline. Psychiatric:         Mood and Affect: Mood normal.         Behavior: Behavior normal.          DATA:  Diagnostic tests reviewed for today's visit:    Recent Labs     01/04/22  1308 01/05/22  0625 01/06/22  0722   WBC 13.6* 15.1* 11.3*   HCT 36.5 33.0* 33.3*    see below 209     Iron Saturation:  No components found for: PERCENTFE  FERRITIN:    Lab Results   Component Value Date    FERRITIN 112.0 08/28/2021     IRON:    Lab Results   Component Value Date    IRON 36 01/04/2022     TIBC:    Lab Results   Component Value Date    TIBC 299 01/04/2022       Recent Labs     01/04/22  1309 01/05/22  0625 01/06/22  0722   * 132* 133*   K 4.3 4.5 4.3   CL 94* 96* 95*   CO2 19* 18* 22   BUN 58* 58* 26*   CREATININE 8.4* 8.8* 5.5*     Recent Labs     01/04/22  1309 01/05/22  0625 01/06/22  0722   CALCIUM 8.7 8.5 8.7     No results for input(s): PH, PCO2, PO2 in the last 72 hours.     Invalid input(s): Darlene Jinag    ABG:  No results found for: PH, PCO2, PO2, HCO3, BE, THGB, TCO2, O2SAT  VBG:    Lab Results   Component Value Date    PHVEN 7.302 11/02/2021    RDA8EZI 60.7 11/02/2021    BEVEN 2.6 11/02/2021    R8BQBXGL 100 11/02/2021           BELOW MENTIONED RADIOLOGY STUDY RESULTS REVIEWED BY ME:    CT Head WO Contrast    Result Date: 1/4/2022  EXAMINATION: CT OF THE HEAD WITHOUT CONTRAST  1/4/2022 12:38 pm TECHNIQUE: CT of the head was performed without the administration of intravenous contrast. Dose modulation, iterative reconstruction, and/or weight based adjustment of the mA/kV was utilized to reduce the radiation dose to as low as reasonably achievable. COMPARISON: None. HISTORY: ORDERING SYSTEM PROVIDED HISTORY: Frequent falls TECHNOLOGIST PROVIDED HISTORY: Reason for exam:->Frequent falls Has a \"code stroke\" or \"stroke alert\" been called? ->No Decision Support Exception - unselect if not a suspected or confirmed emergency medical condition->Emergency Medical Condition (MA) Is the patient pregnant?->No Reason for Exam: frequent falls , hx cva FINDINGS: BRAIN/VENTRICLES: There is no acute intracranial hemorrhage, mass effect or midline shift. No abnormal extra-axial fluid collection. The gray-white differentiation is maintained without evidence of an acute infarct. There is no evidence of hydrocephalus. ORBITS: The visualized portion of the orbits demonstrate no acute abnormality. SINUSES: The visualized paranasal sinuses and mastoid air cells demonstrate no acute abnormality. SOFT TISSUES/SKULL:  No acute abnormality of the visualized skull or soft tissues. No acute intracranial abnormality. RECOMMENDATIONS: Unavailable     CT HEAD WO CONTRAST    Result Date: 12/27/2021  EXAMINATION: CT OF THE HEAD WITHOUT CONTRAST  12/27/2021 4:50 pm TECHNIQUE: CT of the head was performed without the administration of intravenous contrast. Dose modulation, iterative reconstruction, and/or weight based adjustment of the mA/kV was utilized to reduce the radiation dose to as low as reasonably achievable. COMPARISON: 08/27/2021 HISTORY: ORDERING SYSTEM PROVIDED HISTORY: falls TECHNOLOGIST PROVIDED HISTORY: Has a \"code stroke\" or \"stroke alert\" been called? ->No Reason for exam:->falls Decision Support Exception - unselect if not a suspected or confirmed emergency medical condition->Emergency Medical Condition (MA) Is the patient pregnant?->No Reason for Exam: Fall, Fall (fell at dialysis today, was putting her shoe on and fell, unsure if she hit her head, no LOC, denies pain anywhere). FINDINGS: BRAIN/VENTRICLES: There is no acute intracranial hemorrhage, mass effect or midline shift. No abnormal extra-axial fluid collection. The gray-white differentiation is maintained without evidence of an acute infarct. There is no evidence of hydrocephalus. Patchy hypodensities in the periventricular and subcortical white matter, which are nonspecific, but may represent chronic small vessel ischemic change. ORBITS: The visualized portion of the orbits demonstrate no acute abnormality. SINUSES: The visualized paranasal sinuses and mastoid air cells demonstrate no acute abnormality. SOFT TISSUES/SKULL:  No acute abnormality of the visualized skull or soft tissues. No acute intracranial abnormality. CT CERVICAL SPINE WO CONTRAST    Result Date: 12/27/2021  EXAMINATION: CT OF THE CERVICAL SPINE WITHOUT CONTRAST 12/27/2021 4:49 pm TECHNIQUE: CT of the cervical spine was performed without the administration of intravenous contrast. Multiplanar reformatted images are provided for review. Dose modulation, iterative reconstruction, and/or weight based adjustment of the mA/kV was utilized to reduce the radiation dose to as low as reasonably achievable. COMPARISON: None. HISTORY: ORDERING SYSTEM PROVIDED HISTORY: fall TECHNOLOGIST PROVIDED HISTORY: Reason for exam:->fall Decision Support Exception - unselect if not a suspected or confirmed emergency medical condition->Emergency Medical Condition (MA) Is the patient pregnant?->No Reason for Exam: Fall, Fall (fell at dialysis today, was putting her shoe on and fell, unsure if she hit her head, no LOC, denies pain anywhere). FINDINGS: BONES/ALIGNMENT: There is mild disc space narrowing throughout. There is no acute fracture or traumatic malalignment. DEGENERATIVE CHANGES: No significant degenerative changes. SOFT TISSUES: There is no prevertebral soft tissue swelling. There is a 2.5 cm hypodensity left lobe of thyroid gland inferiorly. There is a right IJ catheter in place. The lung apices are clear. Mild degenerative disc space narrowing throughout with no acute abnormality seen. 2.5 cm hypodensity left lobe of the thyroid gland. Suggest ultrasound follow-up. RECOMMENDATIONS: Unavailable     CT THORACIC SPINE WO CONTRAST    Result Date: 12/27/2021  EXAMINATION: CT OF THE THORACIC SPINE WITHOUT CONTRAST  12/27/2021 4:50 pm: TECHNIQUE: CT of the thoracic spine was performed without the administration of intravenous contrast. Multiplanar reformatted images are provided for review. Dose modulation, iterative reconstruction, and/or weight based adjustment of the mA/kV was utilized to reduce the radiation dose to as low as reasonably achievable. COMPARISON: None. HISTORY: ORDERING SYSTEM PROVIDED HISTORY: fall TECHNOLOGIST PROVIDED HISTORY: Reason for exam:->fall Is the patient pregnant?->No Reason for Exam: Fall, Fall (fell at dialysis today, was putting her shoe on and fell, unsure if she hit her head, no LOC, denies pain anywhere). FINDINGS: BONES/ALIGNMENT: There is mild scoliotic curvature of the thoraco lumbar spine, with dextroconvexity. .  The vertebral body heights are maintained. No osseous destructive lesion is seen. DEGENERATIVE CHANGES: Anterolateral spurring is noted at multiple levels. There is no significant narrowing of the central canal. SOFT TISSUES: No paraspinal mass is seen. No acute fracture or subluxation of the thoracic spine. Multilevel spondylosis the RECOMMENDATIONS: Unavailable     CT LUMBAR SPINE WO CONTRAST    Result Date: 12/27/2021  EXAMINATION: CT OF THE LUMBAR SPINE WITHOUT CONTRAST  12/27/2021 TECHNIQUE: CT of the lumbar spine was performed without the administration of intravenous contrast. Multiplanar reformatted images are provided for review. Adjustment of mA and/or kV according to patient size was utilized.   Dose modulation, iterative reconstruction, and/or weight based adjustment of the mA/kV was utilized to reduce the radiation dose to as low as reasonably achievable. COMPARISON: None HISTORY: ORDERING SYSTEM PROVIDED HISTORY: fall TECHNOLOGIST PROVIDED HISTORY: Reason for exam:->fall Decision Support Exception - unselect if not a suspected or confirmed emergency medical condition->Emergency Medical Condition (MA) Is the patient pregnant?->No Reason for Exam: Fall, Fall (fell at dialysis today, was putting her shoe on and fell, unsure if she hit her head, no LOC, denies pain anywhere). FINDINGS: BONES/ALIGNMENT: There is normal alignment of the spine. The vertebral body heights are maintained. No osseous destructive lesion is seen. DEGENERATIVE CHANGES: Mild facet arthropathy. Anterior marginal endplate spurs most pronounced at L1-L2. Otherwise minimal disc space disease identified. SOFT TISSUES/RETROPERITONEUM: No paraspinal mass is seen. There is a borderline retroperitoneal lymph nodes up to 9 mm short axis dimension. No evidence acute fracture traumatic malalignment of the lumbar spine. Borderline/upper limits normal lymph nodes within the retroperitoneum 9 mm short axis dimension. Please correlate with history and laboratory testing. RECOMMENDATIONS: Unavailable     XR CHEST PORTABLE    Result Date: 1/4/2022  EXAMINATION: ONE XRAY VIEW OF THE CHEST 1/4/2022 12:25 pm COMPARISON: 12/27/2021 HISTORY: ORDERING SYSTEM PROVIDED HISTORY: Frequent falls TECHNOLOGIST PROVIDED HISTORY: Reason for exam:->Frequent falls Reason for Exam: Fall (Pt reports frequent falls FINDINGS: Right-sided central venous catheter remains in place. The lungs are without acute focal process. There is no effusion or pneumothorax. The cardiomediastinal silhouette is stable. The osseous structures are stable. No acute process.      XR CHEST PORTABLE    Result Date: 12/27/2021  EXAMINATION: ONE X-RAY VIEW OF THE CHEST 12/27/2021 3:41 pm COMPARISON: November 2, 2021 HISTORY: ORDERING SYSTEM PROVIDED HISTORY:  Falls TECHNOLOGIST PROVIDED HISTORY: Reason for Exam:  Falls Reason for Exam:  Falls FINDINGS: The cardiomediastinal silhouette is enlarged but stable. Right-sided central venous catheter extends to the right atrium. Lung volumes are low. No evidence of acute pulmonary edema or acute infiltrate. No pleural effusion or pneumothorax. Stable cardiomegaly. No acute cardiopulmonary process. XR HIP 2-3 VW W PELVIS RIGHT    Result Date: 1/4/2022  EXAMINATION: ONE XRAY VIEW OF THE PELVIS AND TWO XRAY VIEWS RIGHT HIP 1/4/2022 2:07 pm COMPARISON: None. HISTORY: ORDERING SYSTEM PROVIDED HISTORY:  Fall TECHNOLOGIST PROVIDED HISTORY: Reason For Exam:   Fall FINDINGS: Frontal view of the pelvis and dedicated imaging of the right hip demonstrate no acute fracture or dislocation. Normal bony mineralization. No significant bilateral hip osteoarthritis. SI joints and sacral arcuate lines appear intact. Evaluation is mildly limited by overlying bowel gas and stool. No soft tissue abnormality. 1. No acute osseous abnormality of the pelvis and right hip.

## 2022-01-06 NOTE — CONSULTS
Patient: Marcellus Davies  [id number]  Date: 1/6/2022      Chief Complaint: Recurrent falls    History of Present Illness/Hospital Course:  41-year-old female with a history of ESRD on HD, diabetes, diabetic neuropathy, legal blindness, CVA with resultant right hemiparesis, HTN, and HLD who was admitted on 1/4 with recurrent falls. Due to increasing difficulty with ambulation and transfers, she had missed 2 dialysis sessions prior to admission. She was recently discharged on 12/29 for an admission with generalized weakness and falls consistent with this admission. She scored well enough with therapy evaluations to discharge to home however it does not appear she is able to care for herself with the assistance of her . She was evaluated by therapy and suggested to continue in an inpatient setting prior to returning home. Patient reports that she had her initial stroke in August and discharged to home with outpatient therapies and subsequently her second stroke in late 2021 resulted in her discharging to [address]. She felt that her therapies were not as effective or intensive as her outpatient therapies. She is interested in an ARU stay for more intensive therapy. Prior Level of Function:  Modified independent with walker for ambulation, required assistance for ADLs and IADLs    Current Level of Function:  Total assist     has a past medical history of Blind in both eyes, Cerebral artery occlusion with cerebral infarction ([address] CHF (congestive heart failure) ([address] Chronic kidney disease, Depression, Diabetes mellitus out of control ([address] Diabetes mellitus, type II ([address] Diabetic neuropathy associated with type 2 diabetes mellitus ([address] Generalized headaches, Hypertension, Infertility, Insomnia, Migraine headache, Mixed hyperlipidemia, Otitis media, Pelvic abscess in female, Pneumonia, Stroke ([address] and Stroke ([address]      has a past surgical history that includes Cervix surgery; eye surgery; Foot surgery (Right); Foot surgery (Bilateral); Foot surgery (Left); and IR TUNNELED CVC PLACE WO SQ PORT/PUMP > 5 YEARS (9/7/2021). reports that she has quit smoking. Her smoking use included cigarettes. She smoked 1.00 pack per day. She has never used smokeless tobacco. She reports that she does not drink alcohol and does not use drugs. family history includes Alcohol Abuse in her maternal grandfather and paternal grandfather; Tamica Bancroft in her father; Diabetes in her father, mother, paternal aunt, paternal grandmother, paternal uncle, and sister; High Blood Pressure in her father; Other (age of onset: 79) in her mother. REVIEW OF SYSTEMS:   CONSTITUTIONAL: negative for fevers, chills, diaphoresis, appetite change, fatigue, night sweats and unexpected weight change. HEENT: negative for hearing loss, tinnitus, ear drainage, sinus pressure, nasal congestion, epistaxis and snoring. RESPIRATORY: Negative for hemoptysis, cough, sputum production. CARDIOVASCULAR: negative for chest pain, palpitations, exertional chest pressure/discomfort, edema, syncope. GASTROINTESTINAL: negative for nausea, vomiting, diarrhea, constipation, blood in stool and abdominal pain. GENITOURINARY: negative for frequency, dysuria, urinary incontinence, decreased urine volume, and hematuria. HEMATOLOGIC/LYMPHATIC: negative for easy bruising, bleeding and lymphadenopathy. ALLERGIC/IMMUNOLOGIC: negative for recurrent infections, angioedema, anaphylaxis and drug reactions. ENDOCRINE: negative for weight changes and diabetic symptoms including polyuria, polydipsia and polyphagia. MUSCULOSKELETAL: negative for pain, joint swelling, decreased range of motion and muscle weakness. NEUROLOGICAL: negative for headaches, slurred speech. Positive unilateral weakness. PSYCHIATRIC/BEHAVIORAL: negative for hallucinations, behavioral problems, confusion and agitation.      All pertinent positives are noted in the HPI.    Physical Examination:  Vitals: Patient Vitals for the past 24 hrs:   BP Temp Temp src Pulse Resp SpO2 Weight   01/06/22 0855 (!) 150/78 97.7 °F (36.5 °C) Oral 73 20 92 % --   01/06/22 0832 -- -- -- -- 20 96 % --   01/06/22 0217 -- -- -- -- -- 95 % --   01/06/22 0200 (!) 145/78 97.6 °F (36.4 °C) Oral 86 20 (!) 86 % --   01/05/22 2000 130/79 98.7 °F (37.1 °C) Oral 95 18 94 % --   01/05/22 1825 -- -- -- 94 -- -- --   01/05/22 1607 123/70 98.9 °F (37.2 °C) Oral 100 20 92 % --   01/05/22 1337 (!) 164/77 98.3 °F (36.8 °C) Axillary 98 20 95 % --   01/05/22 1229 (!) 150/83 97.9 °F (36.6 °C) -- 90 20 -- 181 lb 3.5 oz (82.2 kg)     Psych: Stable mood, normal judgement, normal affect. Const: No distress  Eyes: Conjunctiva noninjected, no icterus noted; pupils equal, round. HENT: Atraumatic, normocephalic; Oral mucosa moist  Neck: Trachea midline, neck supple. No thyromegaly noted. CV: No audible murmurs  Resp: No increased WOB, no audible wheezing   GI: Nondistended   Neuro: Alert, oriented, appropriate. Apraxia  Skin: No visible abnormalities  MSK: No joint abnormalities noted. Ext: No significant edema appreciated. No varicosities.     Lab Results   Component Value Date    WBC 11.3 (H) 01/06/2022    HGB 10.6 (L) 01/06/2022    HCT 33.3 (L) 01/06/2022    MCV 81.4 01/06/2022     01/06/2022     Lab Results   Component Value Date    INR 1.08 01/04/2022    INR 1.00 09/07/2021    INR 0.99 08/27/2021    PROTIME 12.2 01/04/2022    PROTIME 11.3 09/07/2021    PROTIME 11.2 08/27/2021     Lab Results   Component Value Date    CREATININE 5.5 (HH) 01/06/2022    BUN 26 (H) 01/06/2022     (L) 01/06/2022    K 4.3 01/06/2022    CL 95 (L) 01/06/2022    CO2 22 01/06/2022     Lab Results   Component Value Date    ALT 15 01/05/2022    AST 29 01/05/2022    ALKPHOS 152 (H) 01/05/2022    BILITOT 0.4 01/05/2022       Most recent imaging studies revealed   EXAMINATION:   CT OF THE HEAD WITHOUT CONTRAST  1/4/2022 12:38 pm     TECHNIQUE:   CT of the head was performed without the administration of intravenous   contrast. Dose modulation, iterative reconstruction, and/or weight based   adjustment of the mA/kV was utilized to reduce the radiation dose to as low   as reasonably achievable.       COMPARISON:   None.       HISTORY:   ORDERING SYSTEM PROVIDED HISTORY: Frequent falls   TECHNOLOGIST PROVIDED HISTORY:   Reason for exam:->Frequent falls   Has a \"code stroke\" or \"stroke alert\" been called? ->No   Decision Support Exception - unselect if not a suspected or confirmed   emergency medical condition->Emergency Medical Condition (MA)   Is the patient pregnant?->No   Reason for Exam: frequent falls , hx cva       FINDINGS:   BRAIN/VENTRICLES: There is no acute intracranial hemorrhage, mass effect or   midline shift.  No abnormal extra-axial fluid collection.  The gray-white   differentiation is maintained without evidence of an acute infarct.  There is   no evidence of hydrocephalus.       ORBITS: The visualized portion of the orbits demonstrate no acute abnormality.       SINUSES: The visualized paranasal sinuses and mastoid air cells demonstrate   no acute abnormality.       SOFT TISSUES/SKULL:  No acute abnormality of the visualized skull or soft   tissues.           Impression   No acute intracranial abnormality. The above laboratory data have been reviewed. The above imaging data have been reviewed. The above medical testing have been reviewed. Body mass index is 28.38 kg/m². Assessment and Plan:  Debility: PT/OT  Recurrent falls: PT/OT  ESRD on HD: MWF, Nephro following  DM  DM neuropathy  H/O CVA: resultant R hemipareis  HTN  HLD    Dispo: Patient is an appropriate ARU candidate. Able to tolerate require 3 hours of therapy in an ARU setting. Able to accept today. Orders placed for transfer. Thank you for the consultation.     Surya Perry MD 1/6/2022, 9:40 AM     * This document was created using dictation software. While all precautions were taken to ensure accuracy, errors may have occurred. Please disregard any typographical errors.

## 2022-01-06 NOTE — PROGRESS NOTES
Resting quietly in bed, eyes closed, respirations even, responded to verbal stimuli. Denies having any pain. Meza care/davion care provided. Assisted with repositioning in bed. VSS. Assessment completed, see flow charts. No distress noted. yessy

## 2022-01-06 NOTE — CARE COORDINATION
Patient was reviewed by [facility]GL[facility] - Dr Aixa Claros , able to accept patient  today. Patient  and spouse were  updated and both were in  agreement . Home at [id number] [facility] was updated.

## 2022-01-06 NOTE — PROGRESS NOTES
Data- discharge order received, pt and  verbalized agreement to discharge, disposition to ARU. Action- Discharge instruction summary: Diet- regular, low potassium, Activity- up x2 to [facility], immunizations reviewed, Transfer code status: Full Code, LDAs to remain with discharge: PIV in right hand. DME used: walker, wheelchair, remains on 2L O2. Response- This RN called report to ARU. Pt belongings gathered, cardiac monitoring removed. Disposition to Discharged via  to rehab by transport, no complications reported. 1. WEIGHT: Admit Weight: 187 lb (84.8 kg) (01/04/22 1152)        Today  Weight: 181 lb 3.5 oz (82.2 kg) (01/05/22 1229)       2.  O2 SAT.: SpO2: 92 % (01/06/22 1645)

## 2022-01-06 NOTE — PROGRESS NOTES
Progress Note - Dr. Jannette Salinas - Internal Medicine  PCP: Jeniffer Nevarez [address] [facility] / [address] [facility]. [address] [facility]    Hospital Day: 2  Code Status: Full Code  Current Diet: ADULT DIET; Regular; 4 carb choices (60 gm/meal)        CC: follow up on medical issues    Subjective:   Puhong Galindo is a 55 y.o. female. Pt seen and examined  Chart reviewed since last visit, labs and imaging below      PT/OT scores rec ARU vs SNF  Working on d/c plans  She is medically stable    She denies chest pain, denies shortness of breath, denies nausea,  denies emesis. 10 system Review of Systems is reviewed with patient, and pertinent positives are noted in HPI above . Otherwise, Review of systems is negative. I have reviewed the patient's medical and social history in detail and updated the computerized patient record. To recap: She  has a past medical history of Blind in both eyes, Cerebral artery occlusion with cerebral infarction ([facility] CHF (congestive heart failure) ([facility] Chronic kidney disease, Depression, Diabetes mellitus out of control ([address] Diabetes mellitus, type II ([address] Diabetic neuropathy associated with type 2 diabetes mellitus ([address] Generalized headaches, Hypertension, Infertility, Insomnia, Migraine headache, Mixed hyperlipidemia, Otitis media, Pelvic abscess in female, Pneumonia, Stroke [facility]), and Stroke ([facility] . She  has a past surgical history that includes Cervix surgery; eye surgery; Foot surgery (Right); Foot surgery (Bilateral); Foot surgery (Left); and IR TUNNELED CVC PLACE WO SQ PORT/PUMP > 5 YEARS (9/7/2021). . She  reports that she has quit smoking. Her smoking use included cigarettes. She smoked 1.00 pack per day. She has never used smokeless tobacco. She reports that she does not drink alcohol and does not use drugs. .        Active Hospital Problems    Diagnosis Date Noted    ESRD (end stage renal disease) ([facility] [N18.6] 10/04/2021    Polyneuropathy due to type 2 diabetes mellitus ([facility] [E11.42] 09/23/2021    HTN (hypertension), benign [I10]     DM (diabetes mellitus), secondary, uncontrolled, w/neurologic complic ([place] [address] [N74.10, E02.78]     Anemia [D64.9] 10/05/2013    Mixed hyperlipidemia [E78.2]        Current Facility-Administered Medications: aspirin EC tablet 81 mg, 81 mg, Oral, Daily  atorvastatin (LIPITOR) tablet 40 mg, 40 mg, Oral, Nightly  tiotropium-olodaterol (STIOLTO) 2.5-2.5 MCG/ACT inhaler 2 puff, 2 puff, Inhalation, Daily  insulin glargine (LANTUS;BASAGLAR) injection pen 20 Units, 20 Units, SubCUTAneous, QAM  lisinopril (PRINIVIL;ZESTRIL) tablet 40 mg, 40 mg, Oral, Daily  albuterol sulfate  (90 Base) MCG/ACT inhaler 2 puff, 2 puff, Inhalation, Q4H PRN  cloNIDine (CATAPRES) 0.2 MG/24HR 1 patch, 1 patch, TransDERmal, Weekly  amLODIPine (NORVASC) tablet 10 mg, 10 mg, Oral, Daily  hydrOXYzine (ATARAX) tablet 25 mg, 25 mg, Oral, 4x Daily PRN  benzonatate (TESSALON) capsule 200 mg, 200 mg, Oral, Q8H PRN  spironolactone (ALDACTONE) tablet 50 mg, 50 mg, Oral, Daily  propranolol (INDERAL LA) extended release capsule 80 mg, 80 mg, Oral, Daily  furosemide (LASIX) tablet 10 mg, 10 mg, Oral, Daily  buPROPion (WELLBUTRIN XL) extended release tablet 150 mg, 150 mg, Oral, QAM  pregabalin (LYRICA) capsule 75 mg, 75 mg, Oral, Nightly  sodium chloride flush 0.9 % injection 10 mL, 10 mL, IntraVENous, 2 times per day  sodium chloride flush 0.9 % injection 10 mL, 10 mL, IntraVENous, PRN  0.9 % sodium chloride infusion, 25 mL, IntraVENous, PRN  heparin (porcine) injection 5,000 Units, 5,000 Units, SubCUTAneous, 3 times per day  ondansetron (ZOFRAN-ODT) disintegrating tablet 4 mg, 4 mg, Oral, Q8H PRN **OR** ondansetron (ZOFRAN) injection 4 mg, 4 mg, IntraVENous, Q6H PRN  acetaminophen (TYLENOL) tablet 650 mg, 650 mg, Oral, Q6H PRN **OR** acetaminophen (TYLENOL) suppository 650 mg, 650 mg, Rectal, Q6H PRN  hydrALAZINE (APRESOLINE) injection 10 mg, 10 mg, IntraVENous, Q6H PRN  0.9 % sodium chloride bolus, 500 mL, IntraVENous, PRN  insulin lispro (1 Unit Dial) 0-12 Units, 0-12 Units, SubCUTAneous, TID WC  insulin lispro (1 Unit Dial) 0-6 Units, 0-6 Units, SubCUTAneous, Nightly  glucose (GLUTOSE) 40 % oral gel 15 g, 15 g, Oral, PRN  dextrose 50 % IV solution, 12.5 g, IntraVENous, PRN  glucagon (rDNA) injection 1 mg, 1 mg, IntraMUSCular, PRN  dextrose 5 % solution, 100 mL/hr, IntraVENous, PRN  ALPRAZolam (XANAX) tablet 0.75 mg, 0.75 mg, Oral, 4 times per day  heparin (porcine) injection 3,200 Units, 3,200 Units, IntraVENous, PRN         Objective:  BP (!) 145/78   Pulse 86   Temp 97.6 °F (36.4 °C) (Oral)   Resp 20   Ht 5' 7\" (1.702 m)   Wt 181 lb 3.5 oz (82.2 kg)   LMP 12/27/2021   SpO2 96%   BMI 28.38 kg/m²      Patient Vitals for the past 24 hrs:   BP Temp Temp src Pulse Resp SpO2 Weight   01/06/22 0832 -- -- -- -- 20 96 % --   01/06/22 0217 -- -- -- -- -- 95 % --   01/06/22 0200 (!) 145/78 97.6 °F (36.4 °C) Oral 86 20 (!) 86 % --   01/05/22 2000 130/79 98.7 °F (37.1 °C) Oral 95 18 94 % --   01/05/22 1825 -- -- -- 94 -- -- --   01/05/22 1607 123/70 98.9 °F (37.2 °C) Oral 100 20 92 % --   01/05/22 1337 (!) 164/77 98.3 °F (36.8 °C) Axillary 98 20 95 % --   01/05/22 1229 (!) 150/83 97.9 °F (36.6 °C) -- 90 20 -- 181 lb 3.5 oz (82.2 kg)   01/05/22 0849 (!) 165/84 97.2 °F (36.2 °C) Axillary 86 20 95 % 183 lb 10.3 oz (83.3 kg)     Patient Vitals for the past 96 hrs (Last 3 readings):   Weight   01/05/22 1229 181 lb 3.5 oz (82.2 kg)   01/05/22 0849 183 lb 10.3 oz (83.3 kg)   01/05/22 0207 181 lb 6.4 oz (82.3 kg)           Intake/Output Summary (Last 24 hours) at 1/6/2022 0840  Last data filed at 1/6/2022 0437  Gross per 24 hour   Intake 800 ml   Output 2150 ml   Net -1350 ml         Physical Exam:   Vitals as above  General appearance: alert, appears stated age and cooperative    Head: Normocephalic, without obvious abnormality, atraumatic    Lungs: clear to auscultation bilaterally Heart: regular rate and rhythm, S1, S2 normal, no murmur    Abdomen: soft, non-tender; bowel sounds normal; no masses, no organomegaly    Extremities: extremities normal, atraumatic, no cyanosis, no edema      Labs:  Lab Results   Component Value Date    WBC 11.3 (H) 01/06/2022    HGB 10.6 (L) 01/06/2022    HCT 33.3 (L) 01/06/2022     01/06/2022    CHOL 164 02/24/2021    TRIG 319 (H) 08/31/2021    HDL 41 02/24/2021    LDLDIRECT 100 (H) 04/18/2011    ALT 15 01/05/2022    AST 29 01/05/2022     (L) 01/06/2022    K 4.3 01/06/2022    CL 95 (L) 01/06/2022    CREATININE 5.5 (HH) 01/06/2022    BUN 26 (H) 01/06/2022    CO2 22 01/06/2022    INR 1.08 01/04/2022    LABA1C 6.6 01/04/2022    LABMICR YES 01/04/2022     Lab Results   Component Value Date    CKTOTAL 25 (L) 09/13/2021    TROPONINI 0.27 (H) 01/04/2022       Recent Imaging Results are Reviewed:  CT Head WO Contrast    Result Date: 1/4/2022  EXAMINATION: CT OF THE HEAD WITHOUT CONTRAST  1/4/2022 12:38 pm TECHNIQUE: CT of the head was performed without the administration of intravenous contrast. Dose modulation, iterative reconstruction, and/or weight based adjustment of the mA/kV was utilized to reduce the radiation dose to as low as reasonably achievable. COMPARISON: None. HISTORY: ORDERING SYSTEM PROVIDED HISTORY: Frequent falls TECHNOLOGIST PROVIDED HISTORY: Reason for exam:->Frequent falls Has a \"code stroke\" or \"stroke alert\" been called? ->No Decision Support Exception - unselect if not a suspected or confirmed emergency medical condition->Emergency Medical Condition (MA) Is the patient pregnant?->No Reason for Exam: frequent falls , hx cva FINDINGS: BRAIN/VENTRICLES: There is no acute intracranial hemorrhage, mass effect or midline shift. No abnormal extra-axial fluid collection. The gray-white differentiation is maintained without evidence of an acute infarct. There is no evidence of hydrocephalus.  ORBITS: The visualized portion of the orbits demonstrate no acute abnormality. SINUSES: The visualized paranasal sinuses and mastoid air cells demonstrate no acute abnormality. SOFT TISSUES/SKULL:  No acute abnormality of the visualized skull or soft tissues. No acute intracranial abnormality. RECOMMENDATIONS: Unavailable     CT HEAD WO CONTRAST    Result Date: 12/27/2021  EXAMINATION: CT OF THE HEAD WITHOUT CONTRAST  12/27/2021 4:50 pm TECHNIQUE: CT of the head was performed without the administration of intravenous contrast. Dose modulation, iterative reconstruction, and/or weight based adjustment of the mA/kV was utilized to reduce the radiation dose to as low as reasonably achievable. COMPARISON: 08/27/2021 HISTORY: ORDERING SYSTEM PROVIDED HISTORY: falls TECHNOLOGIST PROVIDED HISTORY: Has a \"code stroke\" or \"stroke alert\" been called? ->No Reason for exam:->falls Decision Support Exception - unselect if not a suspected or confirmed emergency medical condition->Emergency Medical Condition (MA) Is the patient pregnant?->No Reason for Exam: Fall, Fall (fell at dialysis today, was putting her shoe on and fell, unsure if she hit her head, no LOC, denies pain anywhere). FINDINGS: BRAIN/VENTRICLES: There is no acute intracranial hemorrhage, mass effect or midline shift. No abnormal extra-axial fluid collection. The gray-white differentiation is maintained without evidence of an acute infarct. There is no evidence of hydrocephalus. Patchy hypodensities in the periventricular and subcortical white matter, which are nonspecific, but may represent chronic small vessel ischemic change. ORBITS: The visualized portion of the orbits demonstrate no acute abnormality. SINUSES: The visualized paranasal sinuses and mastoid air cells demonstrate no acute abnormality. SOFT TISSUES/SKULL:  No acute abnormality of the visualized skull or soft tissues. No acute intracranial abnormality.      CT CERVICAL SPINE WO CONTRAST    Result Date: 12/27/2021  EXAMINATION: CT OF THE CERVICAL SPINE WITHOUT CONTRAST 12/27/2021 4:49 pm TECHNIQUE: CT of the cervical spine was performed without the administration of intravenous contrast. Multiplanar reformatted images are provided for review. Dose modulation, iterative reconstruction, and/or weight based adjustment of the mA/kV was utilized to reduce the radiation dose to as low as reasonably achievable. COMPARISON: None. HISTORY: ORDERING SYSTEM PROVIDED HISTORY: fall TECHNOLOGIST PROVIDED HISTORY: Reason for exam:->fall Decision Support Exception - unselect if not a suspected or confirmed emergency medical condition->Emergency Medical Condition (MA) Is the patient pregnant?->No Reason for Exam: Fall, Fall (fell at dialysis today, was putting her shoe on and fell, unsure if she hit her head, no LOC, denies pain anywhere). FINDINGS: BONES/ALIGNMENT: There is mild disc space narrowing throughout. There is no acute fracture or traumatic malalignment. DEGENERATIVE CHANGES: No significant degenerative changes. SOFT TISSUES: There is no prevertebral soft tissue swelling. There is a 2.5 cm hypodensity left lobe of thyroid gland inferiorly. There is a right IJ catheter in place. The lung apices are clear. Mild degenerative disc space narrowing throughout with no acute abnormality seen. 2.5 cm hypodensity left lobe of the thyroid gland. Suggest ultrasound follow-up. RECOMMENDATIONS: Unavailable     CT THORACIC SPINE WO CONTRAST    Result Date: 12/27/2021  EXAMINATION: CT OF THE THORACIC SPINE WITHOUT CONTRAST  12/27/2021 4:50 pm: TECHNIQUE: CT of the thoracic spine was performed without the administration of intravenous contrast. Multiplanar reformatted images are provided for review. Dose modulation, iterative reconstruction, and/or weight based adjustment of the mA/kV was utilized to reduce the radiation dose to as low as reasonably achievable. COMPARISON: None.  HISTORY: ORDERING SYSTEM PROVIDED HISTORY: fall TECHNOLOGIST PROVIDED HISTORY: Reason for exam:->fall Is the patient pregnant?->No Reason for Exam: Fall, Fall (fell at dialysis today, was putting her shoe on and fell, unsure if she hit her head, no LOC, denies pain anywhere). FINDINGS: BONES/ALIGNMENT: There is mild scoliotic curvature of the thoraco lumbar spine, with dextroconvexity. .  The vertebral body heights are maintained. No osseous destructive lesion is seen. DEGENERATIVE CHANGES: Anterolateral spurring is noted at multiple levels. There is no significant narrowing of the central canal. SOFT TISSUES: No paraspinal mass is seen. No acute fracture or subluxation of the thoracic spine. Multilevel spondylosis the RECOMMENDATIONS: Unavailable     CT LUMBAR SPINE WO CONTRAST    Result Date: 12/27/2021  EXAMINATION: CT OF THE LUMBAR SPINE WITHOUT CONTRAST  12/27/2021 TECHNIQUE: CT of the lumbar spine was performed without the administration of intravenous contrast. Multiplanar reformatted images are provided for review. Adjustment of mA and/or kV according to patient size was utilized. Dose modulation, iterative reconstruction, and/or weight based adjustment of the mA/kV was utilized to reduce the radiation dose to as low as reasonably achievable. COMPARISON: None HISTORY: ORDERING SYSTEM PROVIDED HISTORY: fall TECHNOLOGIST PROVIDED HISTORY: Reason for exam:->fall Decision Support Exception - unselect if not a suspected or confirmed emergency medical condition->Emergency Medical Condition (MA) Is the patient pregnant?->No Reason for Exam: Fall, Fall (fell at dialysis today, was putting her shoe on and fell, unsure if she hit her head, no LOC, denies pain anywhere). FINDINGS: BONES/ALIGNMENT: There is normal alignment of the spine. The vertebral body heights are maintained. No osseous destructive lesion is seen. DEGENERATIVE CHANGES: Mild facet arthropathy. Anterior marginal endplate spurs most pronounced at L1-L2. Otherwise minimal disc space disease identified.  SOFT TISSUES/RETROPERITONEUM: No paraspinal mass is seen. There is a borderline retroperitoneal lymph nodes up to 9 mm short axis dimension. No evidence acute fracture traumatic malalignment of the lumbar spine. Borderline/upper limits normal lymph nodes within the retroperitoneum 9 mm short axis dimension. Please correlate with history and laboratory testing. RECOMMENDATIONS: Unavailable     XR CHEST PORTABLE    Result Date: 1/4/2022  EXAMINATION: ONE XRAY VIEW OF THE CHEST 1/4/2022 12:25 pm COMPARISON: 12/27/2021 HISTORY: ORDERING SYSTEM PROVIDED HISTORY: Frequent falls TECHNOLOGIST PROVIDED HISTORY: Reason for exam:->Frequent falls Reason for Exam: Fall (Pt reports frequent falls FINDINGS: Right-sided central venous catheter remains in place. The lungs are without acute focal process. There is no effusion or pneumothorax. The cardiomediastinal silhouette is stable. The osseous structures are stable. No acute process. XR CHEST PORTABLE    Result Date: 12/27/2021  EXAMINATION: ONE X-RAY VIEW OF THE CHEST 12/27/2021 3:41 pm COMPARISON: November 2, 2021 HISTORY: ORDERING SYSTEM PROVIDED HISTORY:  Falls TECHNOLOGIST PROVIDED HISTORY: Reason for Exam:  Falls Reason for Exam:  Falls FINDINGS: The cardiomediastinal silhouette is enlarged but stable. Right-sided central venous catheter extends to the right atrium. Lung volumes are low. No evidence of acute pulmonary edema or acute infiltrate. No pleural effusion or pneumothorax. Stable cardiomegaly. No acute cardiopulmonary process. XR HIP 2-3 VW W PELVIS RIGHT    Result Date: 1/4/2022  EXAMINATION: ONE XRAY VIEW OF THE PELVIS AND TWO XRAY VIEWS RIGHT HIP 1/4/2022 2:07 pm COMPARISON: None. HISTORY: ORDERING SYSTEM PROVIDED HISTORY:  Fall TECHNOLOGIST PROVIDED HISTORY: Reason For Exam:   Fall FINDINGS: Frontal view of the pelvis and dedicated imaging of the right hip demonstrate no acute fracture or dislocation. Normal bony mineralization.   No significant bilateral hip osteoarthritis. SI joints and sacral arcuate lines appear intact. Evaluation is mildly limited by overlying bowel gas and stool. No soft tissue abnormality. 1. No acute osseous abnormality of the pelvis and right hip. Lab Results   Component Value Date    GLUCOSE 151 01/06/2022     Lab Results   Component Value Date    POCGLU 138 01/06/2022     BP (!) 145/78   Pulse 86   Temp 97.6 °F (36.4 °C) (Oral)   Resp 20   Ht 5' 7\" (1.702 m)   Wt 181 lb 3.5 oz (82.2 kg)   LMP 12/27/2021   SpO2 96%   BMI 28.38 kg/m²     Assessment and Plan:  Principal Problem:    ESRD (end stage renal disease) ([address] -Established problem. Stable. Pt is noncompliant  Plan: HD today  Active Problems:    Mixed hyperlipidemia -Established problem. Stable. Plan: Continue present orders/plan. Anemia -Established problem. Stable. Plan: No indication for transfusion. Cont to monitor h/h to assess progression of anemia. Recommend ferrous sulfate or MVI as outpatient. HTN (hypertension), benign -Established problem. Stable. 145/78  Plan: resume home meds    DM (diabetes mellitus), secondary, uncontrolled, w/neurologic complic ([address] -Established problem. Stable. FSBS 138  Plan: cont ccc diet, sliding scale    Polyneuropathy due to type 2 diabetes mellitus ([address]  Plan: Continue present orders/plan.       Failure to thrive - PT/OT eval rec ARU vs SNF  Plan -working on d/c plans, medically stable      (Please note that portions of this note were completed with a voice recognition program.  Efforts were made to edit the dictations but occasionally words are mis-transcribed.)        Claude Borges MD  1/6/2022

## 2022-01-06 NOTE — PROGRESS NOTES
hacullinating since \"last big stroke\" per patient August 2020  Saying her mother has stopped by.  Per  mother has passed away a few years ago

## 2022-01-06 NOTE — CARE COORDINATION
Discharge Plan:   Patient discharged to:  [facility]   FF  Family advised of discharge?:- Yes Spouse -Kannan  BREANN Submitted?:    N/A    All discharge needs met per case management.

## 2022-01-07 LAB
GLUCOSE BLD-MCNC: 109 MG/DL (ref 70–99)
GLUCOSE BLD-MCNC: 117 MG/DL (ref 70–99)
GLUCOSE BLD-MCNC: 127 MG/DL (ref 70–99)
GLUCOSE BLD-MCNC: 176 MG/DL (ref 70–99)
GLUCOSE BLD-MCNC: 180 MG/DL (ref 70–99)
PERFORMED ON: ABNORMAL

## 2022-01-07 PROCEDURE — 94760 N-INVAS EAR/PLS OXIMETRY 1: CPT

## 2022-01-07 PROCEDURE — 97166 OT EVAL MOD COMPLEX 45 MIN: CPT

## 2022-01-07 PROCEDURE — 1280000000 HC REHAB R&B

## 2022-01-07 PROCEDURE — 2700000000 HC OXYGEN THERAPY PER DAY

## 2022-01-07 PROCEDURE — 97535 SELF CARE MNGMENT TRAINING: CPT

## 2022-01-07 PROCEDURE — 94640 AIRWAY INHALATION TREATMENT: CPT

## 2022-01-07 PROCEDURE — 92526 ORAL FUNCTION THERAPY: CPT

## 2022-01-07 PROCEDURE — 97116 GAIT TRAINING THERAPY: CPT

## 2022-01-07 PROCEDURE — 6360000002 HC RX W HCPCS: Performed by: PHYSICAL MEDICINE & REHABILITATION

## 2022-01-07 PROCEDURE — 6370000000 HC RX 637 (ALT 250 FOR IP): Performed by: PHYSICAL MEDICINE & REHABILITATION

## 2022-01-07 PROCEDURE — 97530 THERAPEUTIC ACTIVITIES: CPT

## 2022-01-07 PROCEDURE — 90935 HEMODIALYSIS ONE EVALUATION: CPT

## 2022-01-07 PROCEDURE — 92610 EVALUATE SWALLOWING FUNCTION: CPT

## 2022-01-07 PROCEDURE — 92523 SPEECH SOUND LANG COMPREHEN: CPT

## 2022-01-07 PROCEDURE — 97162 PT EVAL MOD COMPLEX 30 MIN: CPT

## 2022-01-07 PROCEDURE — 2580000003 HC RX 258: Performed by: PHYSICAL MEDICINE & REHABILITATION

## 2022-01-07 RX ADMIN — SODIUM CHLORIDE, PRESERVATIVE FREE 10 ML: 5 INJECTION INTRAVENOUS at 08:56

## 2022-01-07 RX ADMIN — HEPARIN SODIUM 5000 UNITS: 5000 INJECTION INTRAVENOUS; SUBCUTANEOUS at 13:14

## 2022-01-07 RX ADMIN — FUROSEMIDE 10 MG: 20 TABLET ORAL at 10:00

## 2022-01-07 RX ADMIN — PREGABALIN 75 MG: 75 CAPSULE ORAL at 20:37

## 2022-01-07 RX ADMIN — ALPRAZOLAM 0.75 MG: 0.5 TABLET ORAL at 20:36

## 2022-01-07 RX ADMIN — ATORVASTATIN CALCIUM 40 MG: 40 TABLET, FILM COATED ORAL at 20:37

## 2022-01-07 RX ADMIN — TIOTROPIUM BROMIDE AND OLODATEROL 2 PUFF: 3.124; 2.736 SPRAY, METERED RESPIRATORY (INHALATION) at 05:32

## 2022-01-07 RX ADMIN — INSULIN LISPRO 1 UNITS: 100 INJECTION, SOLUTION INTRAVENOUS; SUBCUTANEOUS at 21:18

## 2022-01-07 RX ADMIN — SODIUM CHLORIDE, PRESERVATIVE FREE 10 ML: 5 INJECTION INTRAVENOUS at 20:38

## 2022-01-07 RX ADMIN — HEPARIN SODIUM 5000 UNITS: 5000 INJECTION INTRAVENOUS; SUBCUTANEOUS at 05:16

## 2022-01-07 RX ADMIN — PROPRANOLOL HYDROCHLORIDE 80 MG: 80 CAPSULE, EXTENDED RELEASE ORAL at 10:06

## 2022-01-07 RX ADMIN — INSULIN LISPRO 2 UNITS: 100 INJECTION, SOLUTION INTRAVENOUS; SUBCUTANEOUS at 11:31

## 2022-01-07 RX ADMIN — ALPRAZOLAM 0.75 MG: 0.5 TABLET ORAL at 05:16

## 2022-01-07 RX ADMIN — HEPARIN SODIUM 5000 UNITS: 5000 INJECTION INTRAVENOUS; SUBCUTANEOUS at 22:01

## 2022-01-07 RX ADMIN — SPIRONOLACTONE 50 MG: 25 TABLET ORAL at 08:52

## 2022-01-07 RX ADMIN — INSULIN GLARGINE 20 UNITS: 100 INJECTION, SOLUTION SUBCUTANEOUS at 08:57

## 2022-01-07 RX ADMIN — ALPRAZOLAM 0.75 MG: 0.5 TABLET ORAL at 00:09

## 2022-01-07 RX ADMIN — BUPROPION HYDROCHLORIDE 150 MG: 150 TABLET, EXTENDED RELEASE ORAL at 08:51

## 2022-01-07 RX ADMIN — ASPIRIN 81 MG: 81 TABLET, COATED ORAL at 08:51

## 2022-01-07 RX ADMIN — LISINOPRIL 40 MG: 20 TABLET ORAL at 10:00

## 2022-01-07 RX ADMIN — AMLODIPINE BESYLATE 10 MG: 5 TABLET ORAL at 08:51

## 2022-01-07 ASSESSMENT — PAIN DESCRIPTION - INTENSITY
RATING_2: 0

## 2022-01-07 ASSESSMENT — PAIN SCALES - GENERAL
PAINLEVEL_OUTOF10: 0

## 2022-01-07 NOTE — PROGRESS NOTES
Physical Therapy    Facility/Department: [facility] ACUTE REHAB UNIT  Initial Assessment    NAME: Daly Biggs  : 1975  MRN: [id number]    Date of Service: 2022    Discharge Recommendations:  24 hour supervision or assist,Home with Home health PT   PT Equipment Recommendations  Equipment Needed: Yes  Mobility Devices: Rosalita Gross: Rolling    Assessment   Body structures, Functions, Activity limitations: Decreased functional mobility ; Decreased endurance;Decreased balance;Decreased strength;Decreased posture;Decreased ADL status; Decreased sensation;Decreased safe awareness;Decreased cognition;Decreased vision/visual deficit  Assessment: Pt presenting with significant functional decline following multiple falls and hospital admissions. Patient currently requires extensive assist of 1 for completion of mobility tasks and quickly fatigues in response to mobility tasks. Pt requires consistent physical assistance for balance stabilization during all standing based functional mobility. Treatment Diagnosis: impaired balance, impaired gait, generalized weakness  Prognosis: Good  Decision Making: Medium Complexity  PT Education: Goals;PT Role;Plan of Care;Gait Training;Functional Mobility Training;Energy Conservation  Barriers to Learning: visual deficits, cognitive deficits  REQUIRES PT FOLLOW UP: Yes  Activity Tolerance  Activity Tolerance: Patient limited by fatigue;Patient limited by endurance  Activity Tolerance: Progressive fatigue within session resulting in significant lateral lean during functional activities.        Patient Diagnosis(es): Recurrent Falls     has a past medical history of Blind in both eyes, Cerebral artery occlusion with cerebral infarction [facility]), CHF (congestive heart failure) ([facility] [address] Chronic kidney disease, Depression, Diabetes mellitus out of control ([facility] [address] Diabetes mellitus, type II ([facility] [address] Diabetic neuropathy associated with type 2 diabetes mellitus ([facility] [address] Generalized headaches, Hypertension, Infertility, Insomnia, Migraine headache, Mixed hyperlipidemia, Otitis media, Pelvic abscess in female, Pneumonia, Stroke [facility]), and Stroke ([facility] [address] has a past surgical history that includes Cervix surgery; eye surgery; Foot surgery (Right); Foot surgery (Bilateral); Foot surgery (Left); and IR TUNNELED CVC PLACE WO SQ PORT/PUMP > 5 YEARS (9/7/2021). Restrictions  Restrictions/Precautions  Restrictions/Precautions: Fall Risk (high fall risk)  Required Braces or Orthoses?: No  Position Activity Restriction  Other position/activity restrictions: 14-year-old female with a history of ESRD on HD, diabetes, diabetic neuropathy, legal blindness, CVA with resultant right hemiparesis, HTN, and HLD who was admitted on 1/4 with recurrent falls. Due to increasing difficulty with ambulation and transfers, she had missed 2 dialysis sessions prior to admission. She was recently discharged on 12/29 for an admission with generalized weakness and falls consistent with this admission. She scored well enough with therapy evaluations to discharge to home however it does not appear she is able to care for herself with the assistance of her . She was evaluated by therapy and suggested to continue in an inpatient setting prior to returning home. Patient reports that she had her initial stroke in August and discharged to home with outpatient therapies and subsequently her second stroke in late 2021 resulted in her discharging to [address]. She felt that her therapies were not as effective or intensive as her outpatient therapies. She reports no current complaints. Vision/Hearing  Vision: Impaired (strokes affected vision + B macular degeneration, pt reports ~60% of vision in R eye, blind in L eye.  Limited color differential in R eye)  Vision Exceptions: Legally blind  Hearing: Within functional limits       Subjective  General  Chart Reviewed: Yes  Patient assessed for rehabilitation services?: Yes  Additional Pertinent Hx: Hx of CVA x 2 with multiple fulls in home enviornment  Family / Caregiver Present: No  Referring Practitioner: Dr. Gleda Gottron  Referral Date : 01/06/22  Diagnosis: Recurrent Falls  Follows Commands: Impaired  Other (Comment): Leghargic with increased difficulty dual task attention  General Comment  Comments: Pt seated in recliner upon arrival.  Agreeable to therapy session. Subjective  Subjective: Pt denies pain at rest, reporting (L) calf pain during ambulation with resolves with seated rest break.   Pain Screening  Patient Currently in Pain: Denies     Orientation  Orientation  Overall Orientation Status: Impaired  Orientation Level: Oriented to place;Oriented to situation;Oriented to person;Disoriented to time (oriented to year, disoriented to month.)    Social/Functional History  Social/Functional History  Lives With: Spouse (prior  has been able to provide 24/7 assist)  Type of Home: Apartment (condo, ground level, handicap accessible)  Home Layout: One level  Home Access: Level entry  Bathroom Shower/Tub: Walk-in shower,Shower chair with back  Bathroom Toilet: Standard  Bathroom Equipment: Toilet raiser,Grab bars in shower,Hand-held shower  Bathroom Accessibility: Accessible  Home Equipment: [employer] (working on getting a rollator)  ADL Assistance: Needs assistance (spouse provides supervision for showers to make sure she gets all the soap off and assists with matching colors when dressing)  Homemaking Assistance: Needs assistance (pt assists with cooking, spouse does cooking and cleaning)  Ambulation Assistance: Independent (RW for primary means of ambulation.)  Transfer Assistance: Independent  Active : No  Patient's  Info:  drives  Occupation: Unemployed (in process of getting disability)  IADL Comments: sleeps in flat bed  Additional Comments: Multiple falls prior to admission (4-5 falls per day), pt able to get herself up from most falls indep. Pt reports she feels that she has been falling more since change in her medication a few months ago. Cognition   Cognition  Overall Cognitive Status: Exceptions  Arousal/Alertness: Delayed responses to stimuli  Following Commands: Follows one step commands consistently  Attention Span: Attends with cues to redirect  Memory: Appears intact  Safety Judgement: Decreased awareness of need for assistance  Problem Solving: Assistance required to generate solutions;Assistance required to implement solutions  Insights: Decreased awareness of deficits  Initiation: Requires cues for some  Sequencing: Requires cues for some  Cognition Comment: balance deficits and safety appear to be limited by deficits in dual tasking - i.e. leaning significantly to R side during prolonged standing during washing hands    Objective  Observation/Palpation  Posture: Fair (forward flexed posture. Periods of significant (R) lateral lean which improves with VC.)  Observation: Bruising to (R) shoulder and knee    AROM RLE (degrees)  RLE AROM: WFL  AROM LLE (degrees)  LLE AROM : WFL  Strength RLE  Strength RLE: Exception  Comment: 3+/5 hip flexors, 4-/5 quad, 4/5 ankle dorsiflexors  Strength LLE  Strength LLE: Exception  Comment: 3+/5 hip flexors, 4-/5 quad, 4/5 ankle dorsiflexors  Tone RLE  RLE Tone: Normotonic  Tone LLE  LLE Tone: Normotonic  Motor Control  Gross Motor?: Exceptions  Comments: delayed (R) UE/LE motor velocity  Coordination  Rapid Alternating Movements:  (difficulty attending to coordination tests with delay motor velocity. Coordination appears intact grossly.)  Sensation  Overall Sensation Status: Impaired (reports N/T in (B) LE.   Accurately detects gross touch with no reported change in accuity.)  Bed mobility  Bridging: Minimal assistance  Rolling to Left: Minimal assistance  Rolling to Right: Contact guard assistance  Supine to Sit: Minimal assistance  Sit to Supine: Minimal assistance  Scooting: Minimal assistance  Comment: HOB flat without use of HR  Increased time for all task completion with VC for positioning. Transfers  Sit to Stand: Minimal Assistance  Stand to sit: Minimal Assistance  Stand Pivot Transfers: Minimal Assistance  Car Transfer: Unable to assess (held secondary to fatigue)  Comment: VC for setup of transfers. In addition patient completes toilet transfer at min (A) with use of RW and (L) grab bar. Pt requires min/mod (A) of 1 for static balance maintenance in addition to dependent assist of 1 for pericare. Ambulation  Ambulation?: Yes  Ambulation 1  Surface: level tile  Device: Rolling Walker  Assistance: Min (A) regressing to periods of max (A) with fatigue. Quality of Gait: Reciprocal gait pattern with reduced (B) stride length and reduced foot clearance, forward flexed posture with progressive (R) lateral lean with fatigue. Gait Deviations: Slow Kinjal  Distance: 20' + 15'  Comments: Requires assistance for walker stabilization during ambulation  Stairs/Curb  Stairs?: No (unable to attempt secondary to fatigue)     Balance  Sitting - Static: Fair  Sitting - Dynamic: Poor;+  Standing - Static: Poor;+  Standing - Dynamic: Poor  Comments: Progressive (R) lateral lean in seated and standing position which increases with dual task items and prolonged task completion. In addition to above functional mobility, patient completes standing hand hygiene at sink, regressing from CGA => max (A) with progressive (R) forward lateral lean with fatigue. Plan   Plan  Times per week: 5x/week, 75 min/day  Current Treatment Recommendations: Strengthening,Balance Training,Functional Mobility Training,Transfer Training,ADL/Self-care Training,Stair training,Endurance Training,Gait Training,Neuromuscular Re-education,Positioning,Modalities,Patient/Caregiver Education & Training,Safety Education & Training  Safety Devices  Type of devices:  All fall risk precautions in place,Call light within reach,Left in bed,Bed alarm in place,Gait belt  Restraints  Initially in place: No      Goals  Short term goals  Time Frame for Short term goals: 2 weeks  Short term goal 1: Pt to complete bed mobility at modified independent level with use of bed rail. Short term goal 2: Pt to complete transfers at [facility]  Short term goal 3: Pt to ambulate 150 ft with RW at [facility]  Short term goal 4: Pt to ascend/descend 4 steps with (B) HR at [facility]  Short term goal 5: Pt to complete car transfer at [facility]  Patient Goals   Patient goals :  To reduce falling episodes       Therapy Time   Individual Concurrent Group Co-treatment   Time In       6337   Time Out       1353   Minutes       68   Timed Code Treatment Minutes: 53 Minutes   Total Treatment Time: [address] PT, DPT - [id number]

## 2022-01-07 NOTE — PROGRESS NOTES
Facility/Department: [facility] ACUTE REHAB UNIT  Initial Assessment    Patient: Mary Early   : 1975   MRN: [id number]      Evaluation Date: 2022      Medical Diagnosis Information:                         H&P:   55 y.o. female who presents with the above complaint frequent falls.  The patient states has fall issues. Shanell Blaze over the past several days she has had increasing falls with subsequent bruising. Pooja Acharya does live in a condo/apartment with her . Pooja Acharya does indicate that he has indicated to her that it is becoming more difficult to care for her. Shy Shrestha does report right-sided weakness which is residual from CVA x2 occurring .  She has been admitted this facility for general weakness on 2021 and 2021.  Patient diagnosis upon admission and discharge was hyperkalemia, general weakness, ESRD on dialysis. History/Prior Level of Function:   Living Status: Pt lives at home with her , had 6 week stay at skilled nursing at [facility] s/p CVA. Occupation/School:  On disability. 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   Medication Management: :  []Primary   [x]Secondary ( puts pills into pill organizer)  []No  Finance Management: []Primary   [x]Secondary []No  Active :   []Yes         [x]No   Hearing:    [x] [facility]       []Hard of hearing   []Hearing aids  R/L  Vision:    [] WFL       [x]Visual deficits (legally blind in left eye, 80% vision in right eye)     [] Glasses     PAIN:  Pain Scale: Denied   Type: []Constant   []Intermittent  []Radiating []Localized []other:  Pain Intervention:       DYSPHAGIA BEDSIDE SWALLOW EVALUATION     Prior Dysphagia History: Pt's swallowing was evaluated s/p previous strokes but did not have any therapy. She has been consuming a regular diet.      Patient Complaint:  Denied any concerns with swallow function     Patient Positioning: Upright in chair    Respiratory Status:   [x]Room Air   []O2 via nasal cannula   []Other:    Current Diet:   Current Liquid:     [x]Regular    []Dysphagia III/Soft & Bite-Sized     []Dysphagia II/Minced & Moist    []Dysphagia I/Pureed  []NPO   [x]Thin  []Nectar thick/Mildly thick   []Honey thick/Mildly thick   []NPO  []Motta Water  []Other:       Dentition:  [x]Adequate  []Dentures Top  []Dentures Bottom  []Missing Many Teeth  []Edentulous  []Other:    Baseline Vocal Quality:  []Normal  []Dysphonic   []Aphonic   [x]Hoarse  []Wet  [x]Weak  [x]Other: Right vocal fold paralysis     Volitional Cough:   []Adequate   [x]Strong  []Weak  []Wet  []Absent  []Congested  []Other:    Volitional Swallow:   []Absent   []Delayed     [x]Adequate     []Required use of drink     Oral Mechanism Exam:  [x]WFL []Mild   [] Moderate  []Severe  []To be assessed   []Labial ROM   [x]Labial Symmetry - trace-mild   []Labial Strength   []Labial Sensation   []Labial Coordination    []Lingual ROM  []Lingual Symmetry  [x]Lingual Strength  - trace-mild   []Lingual Sensation    []Lingual Coordination    []Velum    []Mandible  []Gag    Oral Phase: [x]WFL []Mild   [] Moderate  []Severe  []To be assessed   []Impaired Mastication:   []Spillage Left:   []Spillage Right:  []Pocketing Left:   []Pocketing Right:   []Decreased Anterior to Posterior Transit:   []Suspected Premature Bolus Loss:   []Lingual/Palatal Residue:   []Other:     Pharyngeal Phase: [x]WFL []Mild   [] Moderate  []Severe  []To be assessed   []Delayed Swallow:   []Decreased Laryngeal Elevation:   []Absent Swallow:  []Wet Vocal Quality:   []Throat Clearing-Immediate:   []Throat Clearing-Delayed:   []Cough-Immediate:   []Cough-Delayed:  []Change in Vital Signs:  []Other:     Dysphagia Impressions:  Pt grossly tolerated all consistencies during evaluation. She indicated she takes her time with chewing but was able to clear all food and drink from oral cavity. Timing and strength of laryngeal elevation was adequate for airway protection.  Pt had a baseline cough prior to any intake, which did not appear to correlate with any dysphagia. Her vocal quality was hoarse and weak related to a baseline vocal fold paralysis. Provided education regarding diet recommendations and dysphagia risk factors s/p CVA and muscle weakness. There is no further indication for dysphagia treatment at this time. Eating Assistance:  []Independent  [x]Setup or clean-up assistance (due to visual deficits)   [] Supervision or touching assistance   [] Partial or moderate assistance   [] Substantial or maximal assistance  [] Dependent     Recommended Diet and Intervention:  Diet Solids Recommendation:   Liquid Consistency Recommendation:   Recommended form of Meds:   [x]Regular    []Dysphagia III/Soft & Bite-Sized     []Dysphagia II/Minced & Moist    []Dysphagia I/Pureed  []NPO   [x]Thin  []Nectar thick/ Mildly thick   []Honey thick/ Moderately thick   []NPO  []Motta Water  []Other:   []Whole in water  []Meds in puree  []Meds crushed in puree  []Via alternative means of nutrition  []Other:       Dysphagia Therapeutic Intervention:  []  Bolus control Exercises  []  Oral Motor Exercises  []  Sabino Brochure Water Protocol  []  Thermal Stimulation  []  Oral Care    []  Vital Stim/NMES  []  Laryngeal Exercises  []  Patient/Family Education  []  Pharyngeal Exercises  []  Therapeutic PO trials with SLP  []  Diet tolerance monitoring  []  Other:     Compensatory Swallowing Strategies:  []  Alternate Solids/Liquids  []  No straws   [] Lingual sweeps  []  Check for pocketing of food L []  Assist feed  [] Eat/feed Slowly  []  Check for pocketing of food R []  Chin tuck   [] Remain upright 30-45 min  []  Effortful Swallows   []  External pacing  [] Total feed  []  Liquids by spoon only  []  Small bites/sips  [] Other:   []  Upright as possible with all PO []  Swallow 2 times per bite/sip    SHORT TERM DYSPHAGIA GOALS      No dysphagia treatment indicated at this time.      SPEECH LANGUAGE COGNITIVE ASSESSMENT:     Patient complaint: \"I have trouble putting my words together\"     Oral Mechanism Exam:  [x]WFL []Mild   [] Moderate  []Severe  []To be assessed   []Labial ROM   [x]Labial Symmetry - trace-mild   []Labial Strength   []Labial Sensation   []Labial Coordination    []Lingual ROM  []Lingual Symmetry  [x]Lingual Strength - trace-mild   []Lingual Sensation    []Lingual Coordination    []Velum    []Mandible  []Gag    COMPREHENSION  Auditory Comprehension: [x]WFL []Mild   [] Moderate  []Severe  []To be assessed   []Yes/No Questions   []Basic questions   []Complex Questions   []One Step Commands   []Two Step Commands   []Multi-Step Commands   []Complex/Abstract Commands    []Common Pictures/Objects   []L/R Discrimination  []Conversation  []Other:    Reading Comprehension: []WFL []Mild   [] Moderate  []Severe  []To be assessed   [x]Not assessed / pt does not participate in functional reading   []Scanning/Tracking Impairment   []Words Impairment:  []Phrase Impairment:   []Paragraph Impairment:     EXPRESSION  Primary Mode of Expression: Verbal  Verbal Expression: [x]WFL [x]Mild   [] Moderate  []Severe  []To be assessed   []Initiation   []Repetition   []Automatic Speech   []Confrontation   []Convergent   []Divergent   []Responsive   [x]Conversation   [x]Other:  Reported difficulty with putting words together     Written Expression: []WFL []Mild   [] Moderate  []Severe  []To be assessed   [x]Not assessed; pt does not participate in functional writing  Dominant Hand: []Left   [] Right   []Legibility Impairment:    []Dictation Impairment:    []Trace Impairment:         []Copy Impairment:        []Self-formulation Impairment:         Pragmatics/Social Functioning: [x]WFL []Mild   [] Moderate  []Severe  []To be assessed   []Eye Contact    []Topic Maintenance    []Turn Taking   []Affect      []Monotone  []Emotionally labile   []Other:      MOTOR SPEECH  Motor Speech: []WFL [x]Mild   [] Moderate  []Severe  []To be assessed   []Apraxia   [x]Dysarthria - reduced rate of speech; pt reported this is how she compensates to make her speech sound more clear    []Intelligibility:     VOICE  Voice: []WFL [x]Mild   [] Moderate  []Severe  []To be assessed   []Breath Support   []Aphonic   []Breathy   []Harsh     [x]Hoarse  []High pitched for age and sex   []Hypernasal Resonance   []Hyponasal Resonance   []Low pitch for age and sex  []Spastic   [x]Weak   [x]Dysphonic   []Vocal Intensity   Comment:      COGNITION  []Unable to be assessed secondary to Aphasia     Overall Orientation : [x]WFL []Mild   [] Moderate  []Severe  []To be assessed   []Unable to be assessed secondary to Aphasia    []Oriented x4   []Disoriented to time    []Disoriented to situation   []Disoriented to place     []Disoriented to self []Comment:     Attention: []WFL [x]Mild   [] Moderate  []Severe  []To be assessed   []Selective Attention   []Sustained Attention   [x]Alternating Attention   []Divided Attention  []Comment:    Memory: []WFL [x]Mild   [] Moderate  []Severe  []To be assessed   []Daily Routines   []Long-term Memory    []People Encountered   []Prospective Memory    []Immediate/Working Memory   [x]Short-term Memory  []Comment:    Problem Solving: [x]WFL []Mild   [] Moderate  []Severe  []To be assessed   []Complex Functional Tasks   []Managing Finances    []Managing Medications   []Simple Functional Tasks   []Verbal Reasoning Skills  []Comment:    Numeric Reasoning: [x]WFL []Mild   [] Moderate  []Severe  []To be assessed   []Calculations   []Money Management   []Time  []Comment:    Abstract Reasoning: [x]WFL []Mild   [] Moderate  []Severe  []To be assessed   []Convergent Thinking   []Divergent Thinking   []Comment:    Safety/Judgement: [x]WFL []Mild   [] Moderate  []Severe  []To be assessed   []Complex Functional Tasks   []Novel Situations   []Routine Tasks   []Unable to self-monitor and self-correct Consistently    []Insight   []Impulsive   []Flexibility of [] Moderate  []Severe    []Dysarthria   []Mild   [] Moderate  []Severe    []Apraxia of Speech  []Mild   [] Moderate  []Severe   []Dysfluency   []Mild   [] Moderate  []Severe   []Other:   []Mild   [] Moderate  []Severe    Voice  []Dysphonia   []Mild   [] Moderate  []Severe    []Aphonia   []Mild   [] Moderate  []Severe    []Hypophonia   []Mild   [] Moderate  []Severe    []Other:   []Mild   [] Moderate  []Severe    Cognitive-Linguistic  [x]Cognitive-Linguistic Deficits [x]Mild   [] Moderate  []Severe     []Suspected; unable to fully assess at this time   Dysphagia  []Oral Dysphagia  []Mild   [] Moderate  []Severe    []Pharyngeal Dysphagia []Mild   [] Moderate  []Severe    []Other:   []Mild   [] Moderate  []Severe      Prognosis/Rehab Potential:      []Excellent   [x]Good    []Fair   []Poor    Tolerance of evaluation/treatment:    []Excellent   [x]Good    []Fair   []Poor    PLAN:    SLP treatment warranted:    [x] Yes  [] No      Frequency/Duration:  30 minutes/day; 5 days per week, as tolerated, until goals met, or discharged from ARU. Barriers to/and or personal factors that will affect rehab potential:              []Age              []Motivation/Lack of Motivation                        [x]Co-Morbidities              []Cognitive Function, education/learning barriers              []Environmental, home barriers              []Physical limitations   []past ST/medical experience  []Pain   []other:    Discharge Recommendations:   [] Home  [] Home with 24/7 supervision/assistance  [] Home with intermittent supervision   [] Henry Amor   [x] [facility]     EDUCATION:   Provided education regarding role of SLP, results of assessment, recommendations and general speech pathology plan of care. [x] Pt verbalized understanding and agreement   [x] Pt requires ongoing learning   [] No evidence of comprehension     GOALS:  Patient stated goal: \"to not fall\"     Short Term Speech/Language/Cognitive Goals:      1.  Pt will complete word associative tasks to improve mental flexibility, complex thinking, and working memory skills with 90% accuracy. 2.  Pt will complete executive function tasks (i.e. planning, deductive reasoning, inferential, functional organization tasks) with 80% accuracy independently. 3. Continue to monitor speech sound production with additional goals to be added as warranted. If patient discharges prior to next visit, this note will serve as discharge. Therapy Time:   Individual Concurrent Group Co-treatment   Time In 0800         Time Out 0910         Minutes 70          Timed Code Treatment Minutes:  0    Total Treatment Minutes: 70      Electronically signed by:    Kavin Campbell M.A.  CCC-SLP S.PGiovani [id number]  Speech-Language Pathologist   1/7/2022 8:30 AM

## 2022-01-07 NOTE — PROGRESS NOTES
do not hesitate to contact me for any questions/concerns. We will follow along with you. Joseph Rowan MD       [facility] [address] [place] [address]   Office: [phone] or [address]  Fax: [phone]          Late entry , patient was examined while on dialysis, in presence of HD nurse , in HD room.

## 2022-01-07 NOTE — PROGRESS NOTES
Occupational Therapy   Occupational Therapy Initial Assessment  Date: 2022   Patient Name: Quique Quinteros  MRN: [id number]     : 1975    Date of Service: 2022    Discharge Recommendations:  Home with Home health OT,24 hour supervision or assist  OT Equipment Recommendations  Equipment Needed: No  Other: owns grab bars, shower chair    Assessment   Performance deficits / Impairments: Decreased functional mobility ; Decreased ADL status; Decreased endurance;Decreased balance;Decreased vision/visual deficit; Decreased safe awareness  Assessment: Pt is well below her baseline level of occupation function, based on the above deficits associated with ESRD. Pt has a chronic visual deficit. Pt would benefit from continued skilled acute OT services to address these deficits. Treatment Diagnosis: Decreased ADL status, functional mobility, endurance, balance, and safety awareness associated with ESRD  Prognosis: Good  History: Pt lives w/spouse, previously S level w/ADLs, multiple falls PTA  OT Education: OT Role;Plan of Care;Precautions; ADL Adaptive Strategies;Transfer Training  Patient Education: continue to reinforce for carryover  Barriers to Learning: Visual  REQUIRES OT FOLLOW UP: Yes  Activity Tolerance  Activity Tolerance: Patient Tolerated treatment well;Patient limited by fatigue  Activity Tolerance: closing eyes frequently, agreeable to continued activity  Safety Devices  Safety Devices in place: Yes  Type of devices: All fall risk precautions in place; Left in bed;Bed alarm in place;Call light within reach           Patient Diagnosis(es): CVA     has a past medical history of Blind in both eyes, Cerebral artery occlusion with cerebral infarction ([facility] [address] CHF (congestive heart failure) ([address] Chronic kidney disease, Depression, Diabetes mellitus out of control ([address] Diabetes mellitus, type II ([address] Diabetic neuropathy associated with type 2 diabetes mellitus ([facility] [address] Generalized headaches, Hypertension, Infertility, Insomnia, Migraine headache, Mixed hyperlipidemia, Otitis media, Pelvic abscess in female, Pneumonia, Stroke [facility]), and Stroke ([facility] [address] has a past surgical history that includes Cervix surgery; eye surgery; Foot surgery (Right); Foot surgery (Bilateral); Foot surgery (Left); and IR TUNNELED CVC PLACE WO SQ PORT/PUMP > 5 YEARS (9/7/2021). Treatment Diagnosis: Decreased ADL status, functional mobility, endurance, balance, and safety awareness associated with ESRD      Restrictions  Restrictions/Precautions  Restrictions/Precautions: Fall Risk (high fall risk)  Required Braces or Orthoses?: No  Position Activity Restriction  Other position/activity restrictions: 77-year-old female with a history of ESRD on HD, diabetes, diabetic neuropathy, legal blindness, CVA with resultant right hemiparesis, HTN, and HLD who was admitted on 1/4 with recurrent falls. Due to increasing difficulty with ambulation and transfers, she had missed 2 dialysis sessions prior to admission. She was recently discharged on 12/29 for an admission with generalized weakness and falls consistent with this admission. She scored well enough with therapy evaluations to discharge to home however it does not appear she is able to care for herself with the assistance of her . She was evaluated by therapy and suggested to continue in an inpatient setting prior to returning home. Patient reports that she had her initial stroke in August and discharged to home with outpatient therapies and subsequently her second stroke in late 2021 resulted in her discharging to [address]. She felt that her therapies were not as effective or intensive as her outpatient therapies. She reports no current complaints.     Subjective   General  Chart Reviewed: Yes  Patient assessed for rehabilitation services?: Yes  Family / Caregiver Present: No  Diagnosis: ESRD  Subjective  Subjective: pt in chair asleep on arrival - pt keeps eyes closed intermittently through session - c/o R hip pain with extended standing otherwise no complaitn of pain    Social/Functional History  Social/Functional History  Lives With: Spouse (prior  has been able to provide 24/7 assist)  Type of Home: Apartment (condo, ground level, handicap accessible)  Home Layout: One level  Home Access: Level entry  Bathroom Shower/Tub: Walk-in shower,Shower chair with back  Bathroom Toilet: Standard  Bathroom Equipment: Toilet raiser,Grab bars in shower,Hand-held shower  Bathroom Accessibility: Accessible  Home Equipment: [employer] (working on getting a rollator)  ADL Assistance: Needs assistance (spouse provides supervision for showers to make sure she gets all the soap off and assists with matching colors when dressing)  Homemaking Assistance: Needs assistance (pt assists with cooking, spouse does cooking and cleaning)  Ambulation Assistance: Independent (RW for primary means of ambulation.)  Transfer Assistance: Independent  Active : No  Patient's  Info:  drives  Occupation: Unemployed (in process of getting disability)  IADL Comments: sleeps in flat bed  Additional Comments: Multiple falls prior to admission (4-5 falls per day), pt able to get herself up from most falls indep. Pt reports she feels that she has been falling more since change in her medication a few months ago. Objective        Orientation  Overall Orientation Status: Impaired  Orientation Level: Oriented to place;Oriented to situation;Disoriented to time  Observation/Palpation  Posture: Fair (forward flexed posture. Periods of significant (R) lateral lean which improves with VC.)  Observation: Bruising to (R) shoulder and knee  Balance  Sitting Balance: Minimal assistance  Standing Balance: Moderate assistance  Functional Mobility  Functional - Mobility Device: Rolling Walker  Activity: To/from bathroom  Assist Level:  Moderate assistance  Functional Mobility Comments: min-mod A  Toilet Transfers  Toilet - Technique: Ambulating  Equipment Used: Extra wide bedside commode  Toilet Transfer: Minimal assistance  ADL  Feeding: Setup (assist to open packages)  Grooming: Minimal assistance (min A for thoroughness to wash face - declines oral care today)  UE Bathing: Minimal assistance  LE Bathing: Moderate assistance  UE Dressing: Minimal assistance  LE Dressing: Maximum assistance  Toileting: Dependent/Total  Additional Comments: pt ambulated to bathroom with RW and min-mod A - attempted toileting from [facility] atop toilet at assist level above - sponge bathing/dressing from toilet - mod A for standing balance during clothing management and pericare - stood at sink to wash hands with CGA progressing to mod A for balance in standing - able to ambulate to return to EOB with RW and min A  Tone RUE  RUE Tone: Normotonic  Tone LUE  LUE Tone: Normotonic  Coordination  Movements Are Fluid And Coordinated: Yes  Quality of Movement Other  Comment: coordination appears to be more limited by vision than strength and coordination     Bed mobility  Rolling to Left: Minimal assistance  Rolling to Right: Minimal assistance  Supine to Sit: Minimal assistance  Sit to Supine: Minimal assistance  Scooting: Minimal assistance     Vision - Basic Assessment  Visual History: Macular degeneration;Surgical intervention  Vision Comments: pt reports blind in L eye - \"80% vision\" in R eye - able to distinguish and move around objects with high contrast during evaluation  Cognition  Overall Cognitive Status: Exceptions  Arousal/Alertness: Delayed responses to stimuli  Following Commands:  Follows one step commands consistently  Attention Span: Attends with cues to redirect  Memory: Appears intact  Safety Judgement: Decreased awareness of need for assistance  Problem Solving: Assistance required to generate solutions;Assistance required to implement solutions  Insights: Decreased awareness of deficits  Initiation: Requires cues for some  Sequencing: Requires cues for some  Cognition Comment: balance deficits and safety appear to be limited by deficits in dual tasking - i.e. leaning significantly to R side during prolonged standing during washing hands        Sensation  Overall Sensation Status: Impaired (reports N/T in (B) LE. Accurately detects gross touch with no reported change in accuity.)        LUE AROM (degrees)  LUE AROM : WFL  Left Hand AROM (degrees)  Left Hand AROM: WFL  LUE Strength  Gross LUE Strength: WFL  RUE Strength  Gross RUE Strength: WFL       Plan   Plan  Times per week: 75 minutes 5 days per week  Times per day: Daily  Current Treatment Recommendations: Safety Education & Training,Self-Care / ADL,Endurance Training,Functional Mobility Training,Balance Training       Goals  Short term goals  Time Frame for Short term goals: 2 weeks  Short term goal 1: SBA functional transfers to toilet and shower  Short term goal 2: SBA UB ADLs  Short term goal 3: min A LB ADLs  Short term goal 4: CGA functional mobility for ADLs  Patient Goals   Patient goals : to get stronger, stop falling       Therapy Time   Individual Concurrent Group Co-treatment   Time In       1245   Time Out       1353   Minutes       68   Timed Code Treatment Minutes: 58 Minutes   Total minutes 68    Valentino Perez, OT   Valentino Milan Agent OTR/L [id number], 1/7/2022, 4:05 PM

## 2022-01-07 NOTE — FLOWSHEET NOTE
01/07/22 1423 01/07/22 1723   Treatment   Time On 1423  --    Time Off  --  1723   Vital Signs   BP (!) 148/73 128/73   Temp 96.9 °F (36.1 °C) 96.5 °F (35.8 °C)   Pulse 78 78   Resp 20 20   Weight 179 lb 0.2 oz (81.2 kg) 176 lb 12.9 oz (80.2 kg)   Post-Hemodialysis Assessment   NET Removed (ml)  --  1000 ml       Treatment time: 3 hours  Net UF: 1000 ml    Pre weight: 81.2kg  Post weight: 80.2kg  EDW: 88.0kg (85.0kg per patient)    Access used: central line  Access function: good    Medications or blood products given: n/a    Regular outpatient schedule: MWF    Summary of response to treatment: pt tolerated treatment well, vital signs stable, 1000ml removed     Copy of dialysis treatment record placed in chart, to be scanned into EMR.     Electronically signed by Donald Anthony RN on 1/7/2022 at 5:35 PM

## 2022-01-07 NOTE — PLAN OF CARE
ARU PATIENT TREATMENT PLAN  [facility]  [address] [facility], [address] [place] [address] Drive  [phone]      Carlos A Oh    : 1975  Acct #: [de-identified]  MRN: [id number]  PHYSICIAN:  Shanita Jones MD  Primary Problem    Patient Active Problem List   Diagnosis    Type 2 diabetes mellitus, with long-term current use of insulin ([address]    Mixed hyperlipidemia    Migraine headache    Anemia    Diabetic foot infection ([address]    Pyogenic inflammation of bone ([address]    History of medication noncompliance    Osteomyelitis of left foot (HCC)    Nephrotic syndrome    Peripheral edema    Pulmonary edema    Right sided numbness    Tobacco dependence    Chronic kidney disease (CKD), stage III (moderate) (HCC)    Chronic diastolic (congestive) heart failure (HCC)    History of CVA (cerebrovascular accident)    Arterial ischemic stroke, ICA, left, acute ([address]    HTN (hypertension), benign    DM (diabetes mellitus), secondary, uncontrolled, w/neurologic complic ([address]    Dyslipidemia    Smoker    Panic disorder    Isolated proteinuria    Acute on chronic diastolic heart failure (HCC)    Diabetic peripheral neuropathy (HCC)    Acute respiratory failure (HCC)    Depression    Abnormal gait    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, associated with type 2 diabetes mellitus ([address]    Cellulitis of right foot    Hidradenitis suppurativa    Hypocalcemia    Non-toxic multinodular goiter    Polyneuropathy due to type 2 diabetes mellitus (HCC)    Proliferative diabetic retinopathy associated with type 2 diabetes mellitus ([address]    ESRD (end stage renal disease) ([address]    Acute respiratory failure with hypoxemia (HCC)    Acute respiratory failure due to COVID-19 ([address]    Suspected COVID-19 virus infection    Epiglottitis    Recurrent falls       Rehabilitation Diagnosis:       Recurrent falls: PT/OT    Debility: PT/OT     ESRD on HD: MWF, Nephro following     DM: lantus 20, SSI     DM neuropathy: lyrica 76     H/O CVA: resultant R hemiparesis per report but none significant on exam this am.     HTN: norvasc 10, clonidine 0.2, lisinopril 40, spironolactone 50     HLD: Lipitor 40     Anx/Dep: wellbutrin , xanax PRN    ADMIT DATE:1/6/2022    Patient Goals: To get stronger and stop falling. Admitting Impairments: Miscellaneous - 16 - Debility  Activities: Impaired Eating, Hygiene, Toileting, Bathing, Dressing, Bed Mobility, Transfers, Ambulation, Stairs, and Endurance. Participation: Prior to admission, patient was living at home with , needed assistance for self care, but was able to ambulate independently with a rolling walker, was not working full time, was not an active .      CARE PLAN     NURSING:  Nicky Kwok while on this unit will:  [x] Be continent of bowel and bladder     [x] Have an adequate number of bowel movements  [] Urinate with no urinary retention >300ml in bladder  [x] Complete bladder protocol with johnson removal  [x] Maintain O2 SATs at 98___%  [x] Have pain managed while on ARU       [x] Be pain free by discharge   [x] Have no skin breakdown while on ARU  [x] Have improved skin integrity via wound measurements  [x] Have no signs/symptoms of infection at the wound site  [x] Be free from injury during hospitalization   [x] Complete education with patient/family with understanding demonstrated for:  [] Adjustment   [] Other:     Nursing Interventions will include:  [x] bowel/bladder training   [x] education for medical assistive devices   [x] medication education   [x] O2 saturation management   [] energy conservation   [x] stress management techniques   [x] fall prevention   [x] alarms protocol   [x] seating and positioning   [x] skin/wound care   [x] pressure relief instruction   [x] dressing changes     [x] infection protection   [x] DVT prophylaxis  [x] assistance with in room safety with transfers to bed, toilet, wheelchair, shower   [x] bathroom activities and hygiene  [x] Other:    Patient/Caregiver Education for:  [x] Disease/sustained injury/management     [x] Medication Use  [] Surgical intervention  [x] Safety  [x] Body mechanics and or joint protection  [x] Health maintenance     [x] Other:     PHYSICAL THERAPY:  Goals:                  Short term goals  Time Frame for Short term goals: 2 weeks  Short term goal 1: Pt to complete bed mobility at modified independent level with use of bed rail. Short term goal 2: Pt to complete transfers at [facility]  Short term goal 3: Pt to ambulate 150 ft with RW at [facility]  Short term goal 4: Pt to ascend/descend 4 steps with (B) HR at [facility]  Short term goal 5: Pt to complete car transfer at [facility]               These goals were reviewed with this patient at the time of assessment and Nancy Magallanes is in agreement. Plan of Care: Pt to be seen 5 out of 7 days per week per ARU protocol (75 minutes with PT)                  Current Treatment Recommendations: Strengthening,Balance Training,Functional Mobility Training,Transfer Training,ADL/Self-care Training,Stair training,Endurance Training,Gait Training,Neuromuscular Re-education,Positioning,Modalities,Patient/Caregiver Education & Training,Safety Education & Training    OCCUPATIONAL THERAPY:  Goals:             Short term goals  Time Frame for Short term goals: 2 weeks  Short term goal 1: SBA functional transfers to toilet and shower  Short term goal 2: SBA UB ADLs  Short term goal 3: min A LB ADLs  Short term goal 4: CGA functional mobility for ADLs :    :  These goals were reviewed with this patient at the time of assessment and Nancy Magallanes is in agreement    Plan of Care:  Pt to be seen 5 out of 7 days per week per ARU protocol (75 minutes with OT)  Patient Education: continue to reinforce for carryover    SPEECH THERAPY: Goals will be left blank if speech is not following this patient.   Goals: Short-term Goals  Goal 1: Pt will complete word associative tasks to improve mental flexibility, complex thinking, and working memory skills with 90% accuracy. Goal 2: Pt will complete executive function tasks (i.e. planning, deductive reasoning, inferential, functional organization tasks) with 80% accuracy independently. Goal 3: Continue to monitor speech sound production with additional goals to be added as warranted. Plan of Care:  Pt to be seen 5 out of 7 days per week per ARU protocol ( 30 minutes with SLP)    Therapy Treatments will include:  [x]  therapeutic exercises    [x]  gait training     [x]  neuromuscular re-ed                            [x]  transfer training             [x] community reintegration    [x] bed mobility                          []  w/c mobility and training  [x]  self care    [x]home mgmt    [x]  cognitive training            [x]  energy conservation        []  dysphagia tx    [x]  speech/language/communication therapy   [x]  group therapy    [x]  patient/family education    [] Other:    CASE MANAGEMENT:  Goals:   Assist patient/family with discharge planning, patient/family counseling, and coordination with insurance during ARU stay. Jeremie Willis will be seen a minimum of 3 hours of therapy per day, a minimum of 5 out of 7 days per week  (please see above for specific treatment plan per PT/OT/SLP). [] In this rare instance due to the nature of this patient's medical involvement, this patient will be seen 15 hours per week (900 minutes within a 7 day period). In addition, dietician/nutritionist may monitor calorie count as well as intake and collaboratively work with SLP on dietary upgrades. Neuropsychology/Psychology may evaluate and provide necessary support.     Medical issues being managed closely and that require 24 hour availability of a physician:  [x] Swallowing Precautions  [x] Bowel/Bladder Fx  [] Weight bearing precautions  [] Wound Care    [x] Pain Mgmt   [] Infection Protection  [x] DVT Prophylaxis   [x] Fall Precautions  [x] Fluid/Electrolyte/Nutrition Balance  [x] Voice Protection   [x] Respiratory  [] Other:    Medical Prognosis: [] Good  [x] Fair    [] Guarded   Total expected IRF days 14  Anticipated discharge destination:Home  [] Home Independently   [] Home with supervision    []SNF     [x] Other - Home with assistance as needed. Physician anticipated functional outcomes:  By discharge, patient will progress to needing SBA for all mobility and activities except being independent with bed mobility and requiring Min A for LB ADLs. IPOC brief synthesis: 27-year-old female with a history of ESRD on HD, diabetes, diabetic neuropathy, legal blindness, CVA with resultant right hemiparesis, HTN, and HLD who was admitted on 1/4 with recurrent falls.  Due to increasing difficulty with ambulation and transfers, she had missed 2 dialysis sessions prior to admission. Morteza Brunner was recently discharged on 12/29 for an admission with generalized weakness and falls consistent with this admission.  She scored well enough with therapy evaluations to discharge to home however it does not appear she is able to care for herself with the assistance of her . Morteza Brunner was evaluated by therapy and suggested to continue in an inpatient setting prior to returning home.  Patient reports that she had her initial stroke in August and discharged to home with outpatient therapies and subsequently her second stroke in late 2021 resulted in her discharging to [address].  She felt that her therapies were not as effective or intensive as her outpatient therapies.  She was admitted with the above goal.      I have reviewed this initial plan of care and agree with its contents:    Title   Name    Date    Time    Physician: Electronically signed by Vipin Watson MD on 1/7/2022 at 6:49 PM    Case Mgmt: Michaelle Lopez MSW, LSW 1/07/2022 @ 8:45. OT: Valentino Liz OTR/L [id number], 1/7/2022, 4:09 PM    PT:  Alexander Cardoso PT, DPT - [id number], 1/7/2022, 4:22 PM    RN:  Melanie Schwab RN , 01/06/2022 @1475    ST: Chayo Bowser CCC-SLP 1/7/2022 [address]    :   Joan Nye PT, [place] [id number]  1/7/22  4:21 PM

## 2022-01-07 NOTE — PLAN OF CARE
Problem: Falls - Risk of:  Goal: Will remain free from falls  Description: Will remain free from falls  1/7/2022 1455 by Ирина Carver RN  Outcome: Ongoing  Note: Fall TIP Education provided.    1/7/2022 0722 by Ирина Carver RN  Outcome: Ongoing  Goal: Absence of physical injury  Description: Absence of physical injury  1/7/2022 1455 by Ирина Carver RN  Outcome: Ongoing  1/7/2022 0722 by Ирина Carver RN  Outcome: Ongoing     Problem: Falls - Risk of:  Goal: Absence of physical injury  Description: Absence of physical injury  1/7/2022 1455 by Ирина Carver RN  Outcome: Ongoing  1/7/2022 0722 by Ирина Carver RN  Outcome: Ongoing    Ирина DOCKERY RN   [phone]

## 2022-01-07 NOTE — PROGRESS NOTES
Admission Period/Goal QM Codes for [employer] [facility]. QM Admit Code Goal Code   Eating 5 6   Oral Hygiene 4 4   Toileting Hygiene 1 4   Shower/Bathing 2 4   UB Dressing 3 4   LB Dressing 2 4   Putting on/off Footwear 2 4   Rolling Left and Right 4 6   Sit To Lying 3 6   Lying to Sitting on Bedside 3 6   Sit to Stand 3 4   Chair/Bed to Chair Transfer 3 4   Toilet Transfers 3 4   Car Transfers 88 4   Walk 10 Feet 2 4   Walk 50 Feet with Two Turns 88 4   Walk 150 Feet 88 4   Walk 10 Feet on Uneven Surfaces 88 4   1 Step (Curb) 88 4   4 Steps 88 4   12 Steps 88 4   Picking up Object from Floor 88 4   Wheel 50 Feet with 2 Turns 9 -   Type - -   Wheel 150 Feet 9 -   Type - -       Following discussion at the Quality Measure Huddle, the above codes were determined to be the patient's usual performance at admission.     OT:  Ozzie Prader, OTR/L #[id number], 1/10/2022, 1208     PT:  Anjel Hull PT, DPT - [id number], 1/11/2022, 719     RN:  Loan PICKERINGN, RN, CRRN 1/7/2022 5759     ST:  [facility], [address] 1/10/2022 1443     :  Gina Stephen , [place] [id number]  1/11/22  10:21 AM

## 2022-01-07 NOTE — H&P
Patient: Oneil Kaba  [id number]  Date: 1/7/2022        Chief Complaint: Recurrent falls     History of Present Illness/Hospital Course:  29-year-old female with a history of ESRD on HD, diabetes, diabetic neuropathy, legal blindness, CVA with resultant right hemiparesis, HTN, and HLD who was admitted on 1/4 with recurrent falls. Due to increasing difficulty with ambulation and transfers, she had missed 2 dialysis sessions prior to admission. She was recently discharged on 12/29 for an admission with generalized weakness and falls consistent with this admission. She scored well enough with therapy evaluations to discharge to home however it does not appear she is able to care for herself with the assistance of her . She was evaluated by therapy and suggested to continue in an inpatient setting prior to returning home. Patient reports that she had her initial stroke in August and discharged to home with outpatient therapies and subsequently her second stroke in late 2021 resulted in her discharging to [address]. She felt that her therapies were not as effective or intensive as her outpatient therapies. She reports no current complaints.      Prior Level of Function:  Modified independent with walker for ambulation, required assistance for ADLs and IADLs     Current Level of Function:  Total assist     has a past medical history of Blind in both eyes, Cerebral artery occlusion with cerebral infarction ([address] CHF (congestive heart failure) ([address] Chronic kidney disease, Depression, Diabetes mellitus out of control ([address] Diabetes mellitus, type II ([address] Diabetic neuropathy associated with type 2 diabetes mellitus ([address] Generalized headaches, Hypertension, Infertility, Insomnia, Migraine headache, Mixed hyperlipidemia, Otitis media, Pelvic abscess in female, Pneumonia, Stroke ([address] and Stroke ([address] has a past surgical history that includes Cervix surgery; eye surgery;  Foot surgery (Right); Foot surgery (Bilateral); Foot surgery (Left); and IR TUNNELED CVC PLACE WO SQ PORT/PUMP > 5 YEARS (9/7/2021). reports that she has quit smoking. Her smoking use included cigarettes. She smoked 1.00 pack per day. She has never used smokeless tobacco. She reports that she does not drink alcohol and does not use drugs. family history includes Alcohol Abuse in her maternal grandfather and paternal grandfather; Keely Like in her father; Diabetes in her father, mother, paternal aunt, paternal grandmother, paternal uncle, and sister; High Blood Pressure in her father; Other (age of onset: 79) in her mother.     Allergies: Amoxicillin, Levofloxacin, Vancomycin, and Tape [adhesive tape]    Current Facility-Administered Medications   Medication Dose Route Frequency Provider Last Rate Last Admin    heparin (porcine) injection 5,000 Units  5,000 Units SubCUTAneous 3 times per day Idris Carrasco MD   5,000 Units at 01/07/22 0516    sodium chloride flush 0.9 % injection 10 mL  10 mL IntraVENous 2 times per day Idris Carrasco MD   10 mL at 01/06/22 2047    sodium chloride flush 0.9 % injection 10 mL  10 mL IntraVENous PRN Idris Carrasco MD        dextrose 5 % solution  100 mL/hr IntraVENous PRN Idris Carrasco MD        dextrose 50 % IV solution  12.5 g IntraVENous PRN Idris Carrasco MD        glucagon (rDNA) injection 1 mg  1 mg IntraMUSCular PRN Idris Carrasco MD        glucose (GLUTOSE) 40 % oral gel 15 g  15 g Oral PRN Idris Carrasco MD        insulin lispro (1 Unit Dial) 0-12 Units  0-12 Units SubCUTAneous TID  Idris Carrasco MD        insulin lispro (1 Unit Dial) 0-6 Units  0-6 Units SubCUTAneous Nightly Idris Carrasco MD        acetaminophen (TYLENOL) tablet 650 mg  650 mg Oral Q4H PRN Idris Carrasco MD        albuterol sulfate  (90 Base) MCG/ACT inhaler 2 puff  2 puff Inhalation Q4H PRN Idris Carrasco MD        ALPRAZolam [facility] [place]) tablet 0.75 mg  0.75 mg Oral 4 times per day Francie Tracey MD   0.75 mg at 01/07/22 0516    amLODIPine (NORVASC) tablet 10 mg  10 mg Oral Daily Francie Tracey MD        aspirin EC tablet 81 mg  81 mg Oral Daily Francie Tracey MD        atorvastatin (LIPITOR) tablet 40 mg  40 mg Oral Nightly Francie Tracey MD   40 mg at 01/06/22 2046    benzonatate (TESSALON) capsule 200 mg  200 mg Oral Q8H PRN Francie Tracey MD        buPROPion [facility] XL) extended release tablet 150 mg  150 mg Oral QAM MD Sabrina Perez ON 1/12/2022] cloNIDine (CATAPRES) 0.2 MG/24HR 1 patch  1 patch TransDERmal Weekly Francie Tracey MD        furosemide (LASIX) tablet 10 mg  10 mg Oral Daily Francie Tracey MD        heparin (porcine) injection 3,600 Units  3,600 Units IntraVENous PRN Francie Tracey MD        hydrALAZINE (APRESOLINE) tablet 50 mg  50 mg Oral Q8H PRN Frnacie Tracey MD        hydrOXYzine (ATARAX) tablet 25 mg  25 mg Oral 4x Daily PRN Francie Tracey MD        insulin glargine (LANTUS;BASAGLAR) injection pen 20 Units  20 Units SubCUTAneous QAM Francie Tracey MD        lisinopril (PRINIVIL;ZESTRIL) tablet 40 mg  40 mg Oral Daily Francie Tracey MD        ondansetron (ZOFRAN-ODT) disintegrating tablet 4 mg  4 mg Oral Q8H PRN Francie Tracey MD        pregabalin (LYRICA) capsule 75 mg  75 mg Oral Nightly Francie Tracey MD   75 mg at 01/06/22 2046    propranolol (INDERAL LA) extended release capsule 80 mg  80 mg Oral Daily Francie Tracey MD        spironolactone (ALDACTONE) tablet 50 mg  50 mg Oral Daily Francie Tracey MD        tiotropium-olodaterol (STIOLTO) 2.5-2.5 MCG/ACT inhaler 2 puff  2 puff Inhalation Daily Francie Tracey MD   2 puff at 01/07/22 0532    traMADol (ULTRAM) tablet 50 mg  50 mg Oral Q6H PRN Francie Tracey MD        traZODone (DESYREL) tablet 50 mg  50 mg Oral Nightly PRN Francie Tracey MD   50 mg at 01/06/22 2046       REVIEW OF SYSTEMS:   CONSTITUTIONAL: negative for fevers, chills, diaphoresis, appetite change, fatigue, night sweats and unexpected weight change. HEENT: negative for hearing loss, tinnitus, ear drainage, sinus pressure, nasal congestion, epistaxis and snoring. RESPIRATORY: Negative for hemoptysis, cough, sputum production. CARDIOVASCULAR: negative for chest pain, palpitations, exertional chest pressure/discomfort, edema, syncope. GASTROINTESTINAL: negative for nausea, vomiting, diarrhea, constipation, blood in stool and abdominal pain. GENITOURINARY: negative for frequency, dysuria, urinary incontinence, decreased urine volume, and hematuria. HEMATOLOGIC/LYMPHATIC: negative for easy bruising, bleeding and lymphadenopathy. ALLERGIC/IMMUNOLOGIC: negative for recurrent infections, angioedema, anaphylaxis and drug reactions. ENDOCRINE: negative for weight changes and diabetic symptoms including polyuria, polydipsia and polyphagia. MUSCULOSKELETAL: negative for pain, joint swelling, decreased range of motion and muscle weakness. NEUROLOGICAL: negative for headaches, slurred speech. Positive unilateral weakness. PSYCHIATRIC/BEHAVIORAL: negative for hallucinations, behavioral problems, confusion and agitation.      All pertinent positives are noted in the HPI. Physical Examination:  Vitals:   Patient Vitals for the past 24 hrs:   BP Temp Temp src Pulse Resp SpO2 Height Weight   01/07/22 0533 -- -- -- -- -- 96 % -- --   01/07/22 0515 -- -- -- -- -- -- -- 175 lb 14.4 oz (79.8 kg)   01/06/22 1945 134/78 97.6 °F (36.4 °C) Oral 82 16 95 % 5' 7\" (1.702 m) 175 lb 14.4 oz (79.8 kg)     Psych: Stable mood, normal judgement, normal affect   Const: No distress  Eyes: Conjunctiva noninjected, no icterus noted; pupils equal, round. HENT: Atraumatic, normocephalic; Oral mucosa moist  Neck: Trachea midline, neck supple. No thyromegaly noted.   CV: Regular rate and rhythm, no murmur rub or gallop noted  Resp: Lungs clear to auscultation bilaterally, no rales wheezes or ronchi, no retractions. Respirations unlabored. GI: Soft, nontender, nondistended. Normal bowel sounds. No palpable masses. Neuro: Alert, oriented, appropriate. Apraxia No cranial nerve deficits appreciated. Sensation intact to light touch. Motor examination reveals: RUE 4/5 LUE 4/5 RLE 3/5 HF 4/5 distal LLE 4-/5 HF 4/5 distal. Reflexes normal and symmetric. Skin: Normal temperature and turgor  MSK: No joint abnormalities noted. Ext: No significant edema appreciated. No varicosities. Lab Results   Component Value Date    WBC 11.3 (H) 01/06/2022    HGB 10.6 (L) 01/06/2022    HCT 33.3 (L) 01/06/2022    MCV 81.4 01/06/2022     01/06/2022     Lab Results   Component Value Date    INR 1.08 01/04/2022    INR 1.00 09/07/2021    INR 0.99 08/27/2021    PROTIME 12.2 01/04/2022    PROTIME 11.3 09/07/2021    PROTIME 11.2 08/27/2021     Lab Results   Component Value Date    CREATININE 5.5 (HH) 01/06/2022    BUN 26 (H) 01/06/2022     (L) 01/06/2022    K 4.3 01/06/2022    CL 95 (L) 01/06/2022    CO2 22 01/06/2022     Lab Results   Component Value Date    ALT 15 01/05/2022    AST 29 01/05/2022    ALKPHOS 152 (H) 01/05/2022    BILITOT 0.4 01/05/2022       Most recent imaging studies revealed   EXAMINATION:   CT OF THE HEAD WITHOUT CONTRAST  1/4/2022 12:38 pm       TECHNIQUE:   CT of the head was performed without the administration of intravenous   contrast. Dose modulation, iterative reconstruction, and/or weight based   adjustment of the mA/kV was utilized to reduce the radiation dose to as low   as reasonably achievable.       COMPARISON:   None.       HISTORY:   ORDERING SYSTEM PROVIDED HISTORY: Frequent falls   TECHNOLOGIST PROVIDED HISTORY:   Reason for exam:->Frequent falls   Has a \"code stroke\" or \"stroke alert\" been called? ->No   Decision Support Exception - unselect if not a suspected or confirmed   emergency medical condition->Emergency Medical Condition (MA)   Is the patient pregnant?->No Reason for Exam: frequent falls , hx cva       FINDINGS:   BRAIN/VENTRICLES: There is no acute intracranial hemorrhage, mass effect or   midline shift.  No abnormal extra-axial fluid collection.  The gray-white   differentiation is maintained without evidence of an acute infarct.  There is   no evidence of hydrocephalus.       ORBITS: The visualized portion of the orbits demonstrate no acute abnormality.       SINUSES: The visualized paranasal sinuses and mastoid air cells demonstrate   no acute abnormality.       SOFT TISSUES/SKULL:  No acute abnormality of the visualized skull or soft   tissues.           Impression   No acute intracranial abnormality.            The above laboratory data have been reviewed.      The above imaging data have been reviewed.      The above medical testing have been reviewed. Body mass index is 27.55 kg/m². Barriers to Discharge: decreased safety, decreased endurance, recurrent falls, ADL    Disposition: Home    Prognosis: Fair    Rehabilitation goals: To return patient to home setting at their prior level of function. POST ADMISSION PHYSICIAN EVALUATION  The patient has agreed to being admitted to our comprehensive inpatient  rehabilitation facility consisting of at least 180 minutes of therapy a day,  5 out of 7 days a week. The patient/family has a good understanding of our discharge process. The  patient has potential to make improvement and is in need of at least two of  the following multidisciplinary therapies including but not limited to  physical, occupational, respiratory, and speech, nutritional services, wound care, and prosthetics and orthotics. Given the patients complex condition  and risk of further medical complications, rehabilitation services cannot be  safely provided at a lower level of care such as a skilled nursing facility.   I have compared the patients medical and functional status at the time of the  preadmission screening and the same on this date, and there are no significant changes except as documented below in the assessment and plan. By signing this document, I acknowledge that I have personally performed a  full physical examination on this patient within 24 hours of admission to  this inpatient rehabilitation facility and have determined the patient to be  able to tolerate the above course of treatment at an intensive level for a  reasonable period of time. I will be completing a detailed individualized  Plan of Care for this patient by day four of the patients stay based upon the  Preadmission Screen, this Post-Admission Evaluation, and the therapy  evaluations. Assessment and Plan:  Debility: PT/OT    Recurrent falls: PT/OT    ESRD on HD: MWF, Nephro following    DM: lantus 20, SSI    DM neuropathy: lyrica 75    H/O CVA: resultant R hemiparesis per report but none significant on exam this am.    HTN: norvasc 10, clonidine 0.2, lisinopril 40, spironolactone 50    HLD: Lipitor 40    Anx/Dep: wellbutrin , xanax PRN    Bowels: Per protocol  Bladder: Per protocol   Sleep: Trazodone provided prn. Pain: tylenol, tramadol PRN   DVT PPx: Heparin    ELOS: 10-14    Ivania Kovacs MD 1/7/2022, 8:26 AM     * This document was created using dictation software. While all precautions were taken to ensure accuracy, errors may have occurred. Please disregard any typographical errors.

## 2022-01-07 NOTE — PROGRESS NOTES
Patient was admitted to room 4907 at 1910. Patient was oriented to the Call Light, Phone, TV, Thermostat, Bed Controls, Bathroom and Emergency Cord. Patient verbalized and demonstrated understanding of all. Patient was also given an over view of Unit Routines for Acute Rehab. Patient states that their normal bowel regime is daily . Meal times were explained, including how to order food. The white board, (which is posted on the wall by the door is used for communication) has the Therapy Scheduled that is posted each day along with the name of your doctor, nurse, and therapist for your convenience. We recommend any family that will be care givers or any care givers the patient has, take part in therapy. We have no set visiting hours, we suggest non-caregiver friends and family visitors come after therapy (at 4 PM or later) to allow patient to rest in between sessions.       In conjunction with the patient and patients family, this nurse worked to establish a tailored Fall TIPS plan to ensure patient safety and compliance:    Falls TIPS Completion    Patient identified as increased risk for harm if fall:  [x] Yes     Fall Risks   History of Falls:    [x] Yes    Medication Side Effects:   [x] Yes    Walking Aid:    [] Yes    IV Pole or Equipment:   [] Yes    Unsteady Walk:     [x] Yes    May Forget or Choose Not to Call: [x] Yes     Fall Interventions    Communicate Recent Fall and/or Risk of Harm: [x] Yes    Walking Aids:  o Crutches: [] Yes   o Cane: [] Yes   o Katherene Res: [x] Yes    IV Assistance When Walking: [] Yes    Toileting Schedule: Every 2 Hours  o Bedpan:   [] Yes   o Assist to Commode: [] Yes   o Assist to Bathroom: [x] Yes    Bed Alarm On: [x] Yes    Assistance Out of Bed:  o Bedrest: [] Yes   o 1 Person: [x] Yes   o 2 People: [] Yes

## 2022-01-07 NOTE — PROGRESS NOTES
Nutrition Assessment     Type and Reason for Visit: Initial,Consult (New ARU admission)    Nutrition Recommendations/Plan:   No nutrition rec's @ this time     Nutrition Assessment:  Pt is stable from nutrition standpoint AEB report of eating well, with % of meals consumed on CCC diet. Pt denies unintentional wt loss. Denies nutrition education & expresses no nutrition concerns @ this time. Malnutrition Assessment:  Malnutrition Status: No malnutrition    Nutrition Related Findings: Trace BLE edema; LBM 1/6      Current Nutrition Therapies:    ADULT DIET; Regular; 4 carb choices (60 gm/meal)    Anthropometric Measures:  · Height: 5' 7\" (170.2 cm)  · Current Body Wt: 175 lb (79.4 kg)   · BMI: 27.4    Nutrition Diagnosis:   · No nutrition diagnosis at this time related to   as evidenced by        Nutrition Interventions:   Food and/or Nutrient Delivery:  Continue Current Diet  Nutrition Education/Counseling:  Education not indicated   Coordination of Nutrition Care:  Continue to monitor while inpatient    Goals:  po intake greater than 50% of meals       Nutrition Monitoring and Evaluation:   Behavioral-Environmental Outcomes:  None Identified   Food/Nutrient Intake Outcomes:  Food and Nutrient Intake  Physical Signs/Symptoms Outcomes:  Weight     Discharge Planning:     Too soon to determine     Electronically signed by Judd Sandoval RD, LD on 1/7/22 at 1:22 PM EST    Contact: [phone]

## 2022-01-07 NOTE — PROGRESS NOTES
4 Eyes Skin Assessment     NAME:  Nancy Magallanes  YOB: 1975  MEDICAL RECORD NUMBER:  [id number]    The patient is being assess for  Admission    I agree that 2 RN's have performed a thorough Head to Toe Skin Assessment on the patient. ALL assessment sites listed below have been assessed. Areas assessed by both nurses:    Head, Face, Ears, Shoulders, Back, Chest, Arms, Elbows, Hands, Sacrum. Buttock, Coccyx, Ischium and Legs. Feet and Heels        Does the Patient have a Wound? Has calluses on 2nd toes of both feet. Has a dialysis catheter.  Multiple bruises from falling         Robbie Prevention initiated:  Yes   Wound Care Orders initiated:  no    Pressure Injury (Stage 3,4, Unstageable, DTI, NWPT, and Complex wounds) if present place consult order under [de-identified] No    New and Established Ostomies if present place consult order under : No      Nurse 1 eSignature: Electronically signed by Aga Patton RN on 1/6/22 at 7:31 PM EST    **SHARE this note so that the co-signing nurse is able to place an eSignature**    Nurse 2 eSignature: Electronically signed by Dani Rios RN on 1/6/22 at 7:33 PM EST

## 2022-01-07 NOTE — CONSULTS
Other Problems:  Hospital Problems           Last Modified POA    Recurrent falls 1/6/2022 Yes          Please refer to orders. Multiple complex problems. High risk  Discussed with patient, and treatment team    Thank you for allowing me to participate in this patient's care. Please do not hesitate to contact me with any questions/concerns. We will follow along with you. Dalia Andre MD  [facility] [address] [place] [address] [place]: [phone] or [employer]  Fax: [phone]    Time spent  ~ 35 minutes that included face-to-face meeting/discussion with patient, and treatment team (including primary/referring team and other consultants; included coordination of care with the treatment team; and review of patient's electronic medical records and ordering appropriates tests.       ===========================================  ===========================================      CHIEF COMPLAINT:   Falls     History Obtained From:  patient + treatment team + Electronic Medical Records. HPI: Ms. Mak Lopez is a 55 y.o. female with significant past medical history of End stage renal disease, and   Past Medical History:   Diagnosis Date    Blind in both eyes     Cerebral artery occlusion with cerebral infarction ([address]     CHF (congestive heart failure) ([address]     Chronic kidney disease     Depression     Diabetes mellitus out of control ([address]     Diabetes mellitus, type II ([address]     2005    Diabetic neuropathy associated with type 2 diabetes mellitus ([address]     Generalized headaches     Hypertension     Infertility     Insomnia     chronic vs lack of time spent to sleep    Migraine headache 11/09/2011    Mixed hyperlipidemia     Otitis media     h/o recurrent    Pelvic abscess in female 10/05/2013    Pneumonia     2004 approx.  Stroke [facility]) 08/27/2020    Stroke ([facility] 08/27/2021    ,   presents with No chief complaint on file.   Admitted with Recurrent falls [R29.6]  We are called for ESRD care. Has been having many falls at home, saying feeling overall weaker,   W/ hx of CVA, w/ residual Rt weakness,   Does iHD MWF, said last HD last Wednesday, missed Friday and this Monday,       - missed HD x2  - encouraged compliance as if her solutes are better control, she may feel overall better    - so needed HD on admission during inpatient stay  - now admitted to ARU for inpatient rehab    No current active complaints. Patient denied chest pain / dizziness/lightheadedness/syncope/ SOB  + chronic mild leg edema. Re: ESRD  · Duration (when): Chronic   · Location (where): kidneys  · Severity (ex: CKD stage): End stage  · Timing (ex: continuous, intermittent): intermittent HD  · Context (ex: related to condition):   DM / HTN related. · Modifying factors (ex: medications, interventions): dialysis support  · Associated signs & symptoms (ex: edema, SOB): Refer to HPI and Chief complaint    Past medical, surgical, social and family medial history reviewed by me:   PAST MEDICAL HISTORY:   Past Medical History:   Diagnosis Date    Blind in both eyes     Cerebral artery occlusion with cerebral infarction ([address]     CHF (congestive heart failure) ([address]     Chronic kidney disease     Depression     Diabetes mellitus out of control ([address]     Diabetes mellitus, type II ([address]     2005    Diabetic neuropathy associated with type 2 diabetes mellitus ([address]     Generalized headaches     Hypertension     Infertility     Insomnia     chronic vs lack of time spent to sleep    Migraine headache 11/09/2011    Mixed hyperlipidemia     Otitis media     h/o recurrent    Pelvic abscess in female 10/05/2013    Pneumonia     2004 approx.     Stroke ([address] 08/27/2020    Stroke ([address] 08/27/2021     PAST SURGICAL HISTORY:   Past Surgical History:   Procedure Laterality Date    CERVIX SURGERY      laser tx for dysplasia;1992    EYE SURGERY      FOOT SURGERY Right     FOOT SURGERY Bilateral     FOOT SURGERY Left     IR TUNNELED CATHETER PLACEMENT GREATER THAN 5 YEARS  9/7/2021    IR TUNNELED CATHETER PLACEMENT GREATER THAN 5 YEARS 9/7/2021 Pepper Gallagher MD [facility] SPECIAL PROCEDURES     FAMILY HISTORY:   Family History   Problem Relation Age of Onset    Diabetes Mother    [facility] Other Mother 79        Covid    Diabetes Father     High Blood Pressure Father     Colon Cancer Father     Diabetes Sister     Alcohol Abuse Maternal Grandfather     Diabetes Paternal Grandmother     Alcohol Abuse Paternal Grandfather     Diabetes Paternal Aunt     Diabetes Paternal Uncle      SOCIAL HISTORY:   Social History     Socioeconomic History    Marital status:      Spouse name: Yoni Miranda Number of children: 0    Years of education: Not on file    Highest education level: Not on file   Occupational History    Occupation: works as    Tobacco Use    Smoking status: Former Smoker     Packs/day: 1.00     Types: Cigarettes    Smokeless tobacco: Never Used    Tobacco comment: Quit in August 2021   Vaping Use    Vaping Use: Never used   Substance and Sexual Activity    Alcohol use: No     Comment: Very Rare    Drug use: No    Sexual activity: Yes     Partners: Male   Other Topics Concern    Not on file   Social History Narrative    Not on file     Social Determinants of Health     Financial Resource Strain:     Difficulty of Paying Living Expenses: Not on file   Food Insecurity:     Worried About [address] in the Last Year: Not on file    [address] in the Last Year: Not on file   Transportation Needs:     Lack of Transportation (Medical): Not on file    Lack of Transportation (Non-Medical):  Not on file   Physical Activity:     Days of Exercise per Week: Not on file    Minutes of Exercise per Session: Not on file   Stress:     Feeling of Stress : Not on file   Social Connections:     Frequency of Communication with Friends and Family: Not on file    Frequency of Social Gatherings with Friends and Family: Not on file    Attends [employer] Services: Not on file    Active Member of Clubs or Organizations: Not on file    Attends Club or Organization Meetings: Not on file    Marital Status: Not on file   Intimate Partner Violence:     Fear of Current or Ex-Partner: Not on file    Emotionally Abused: Not on file    Physically Abused: Not on file    Sexually Abused: Not on file   Housing Stability:     Unable to Pay for Housing in the Last Year: Not on file    Number of [place] in the Last Year: Not on file    Unstable Housing in the Last Year: Not on file          MEDICATIONS reviewed by me:  Prior to Admission Medications:  No current facility-administered medications on file prior to encounter. Current Outpatient Medications on File Prior to Encounter   Medication Sig Dispense Refill    ALPRAZolam (XANAX XR) 3 MG extended release tablet Take 1 tablet by mouth every morning for 3 days.  3 tablet 0    pregabalin (LYRICA) 75 MG capsule TAKE ONE CAPSULE BY MOUTH EVERY NIGHT 30 capsule 0    furosemide (LASIX) 40 MG tablet Take 40 mg by mouth daily       spironolactone (ALDACTONE) 50 MG tablet TAKE ONE TABLET BY MOUTH DAILY 30 tablet 0    propranolol (INDERAL LA) 80 MG extended release capsule TAKE ONE CAPSULE BY MOUTH EVERY MORNING 30 capsule 0    hydrOXYzine (ATARAX) 25 MG tablet       benzonatate (TESSALON) 200 MG capsule Take 1 capsule by mouth every 8 hours as needed for Cough 15 capsule 1    cloNIDine (CATAPRES) 0.2 MG/24HR PTWK Place 1 patch onto the skin once a week 4 patch 2    lisinopril (PRINIVIL;ZESTRIL) 40 MG tablet Take 40 mg by mouth daily      atorvastatin (LIPITOR) 40 MG tablet Take 1 tablet by mouth nightly 30 tablet 3    aspirin 81 MG EC tablet Take 1 tablet by mouth daily 30 tablet 3    albuterol (PROVENTIL) 2.5 MG/0.5ML NEBU nebulizer solution Take 0.5 mLs by nebulization every 6 hours as needed for Wheezing or Shortness of Breath 20 mL 2  Heat Wraps (THERMACARE BACK/HIP) MISC 1 each by Does not apply route daily as needed (back pain) 3 each 5    methyl salicylate-menthol (RANDI LEBLANC GREASELESS) 10-15 % CREA Apply topically 3 times daily as needed for Pain 57 g 0    buPROPion (WELLBUTRIN XL) 150 MG extended release tablet Take 1 tablet by mouth every morning 30 tablet 1    amLODIPine (NORVASC) 10 MG tablet       albuterol sulfate  (90 Base) MCG/ACT inhaler Inhale 2 puffs into the lungs every 6 hours as needed for Wheezing or Shortness of Breath 18 g 1    fluvoxaMINE (LUVOX) 100 MG tablet Take 1 tablet by mouth nightly 30 tablet 1    Dulaglutide 1.5 MG/0.5ML SOPN Inject 1.5 mg into the skin once a week 4 pen 1    Misc.  Devices (PULSE OXIMETER) MISC 1 each by Does not apply route every 6-8 hours as needed (shortness of breath) 1 each 0    Nasal Dilators (BREATHE RIGHT EXTRA STRENGTH) STRP 1 strip by Does not apply route nightly as needed (nasal congestion) 8 strip 2    albuterol sulfate HFA (PROVENTIL HFA) 108 (90 Base) MCG/ACT inhaler Inhale 2 puffs into the lungs every 4 hours as needed for Wheezing 18 g 0    insulin glargine (LANTUS SOLOSTAR) 100 UNIT/ML injection pen Inject 10 Units into the skin every morning       glycopyrrolate-formoterol (BEVESPI AEROSPHERE) 9-4.8 MCG/ACT AERO Inhale 2 puffs into the lungs 2 times daily 10.7 g 2    glucose (GLUTOSE) 40 % GEL Take 37.5 mLs by mouth as needed (low blood sugar) 45 g 1    Insulin Pen Needle 29G X 12.7MM MISC 1 each by Does not apply route daily 100 each 5    insulin lispro, 1 Unit Dial, 100 UNIT/ML SOPN Inject 0-6 Units into the skin 3 times daily (with meals) **Corrective Low Dose Algorithm**  Glucose: Dose:               No Insulin  140-199 1 Unit  200-249 2 Units  250-299 3 Units  300-349 4 Units  350-399 5 Units  Over 399 6 Units 3 pen 0       Medications Prior to Admission: ALPRAZolam (XANAX XR) 3 MG extended release tablet, Take 1 tablet by mouth every morning for 3 days.  pregabalin (LYRICA) 75 MG capsule, TAKE ONE CAPSULE BY MOUTH EVERY NIGHT  furosemide (LASIX) 40 MG tablet, Take 40 mg by mouth daily   spironolactone (ALDACTONE) 50 MG tablet, TAKE ONE TABLET BY MOUTH DAILY  propranolol (INDERAL LA) 80 MG extended release capsule, TAKE ONE CAPSULE BY MOUTH EVERY MORNING  hydrOXYzine (ATARAX) 25 MG tablet,   benzonatate (TESSALON) 200 MG capsule, Take 1 capsule by mouth every 8 hours as needed for Cough  cloNIDine (CATAPRES) 0.2 MG/24HR PTWK, Place 1 patch onto the skin once a week  lisinopril (PRINIVIL;ZESTRIL) 40 MG tablet, Take 40 mg by mouth daily  atorvastatin (LIPITOR) 40 MG tablet, Take 1 tablet by mouth nightly  aspirin 81 MG EC tablet, Take 1 tablet by mouth daily  albuterol (PROVENTIL) 2.5 MG/0.5ML NEBU nebulizer solution, Take 0.5 mLs by nebulization every 6 hours as needed for Wheezing or Shortness of Breath  Heat Wraps (THERMACARE BACK/HIP) MISC, 1 each by Does not apply route daily as needed (back pain)  methyl salicylate-menthol (RANDI LEBLANC GREASELESS) 10-15 % CREA, Apply topically 3 times daily as needed for Pain  buPROPion (WELLBUTRIN XL) 150 MG extended release tablet, Take 1 tablet by mouth every morning  amLODIPine (NORVASC) 10 MG tablet,   albuterol sulfate  (90 Base) MCG/ACT inhaler, Inhale 2 puffs into the lungs every 6 hours as needed for Wheezing or Shortness of Breath  fluvoxaMINE (LUVOX) 100 MG tablet, Take 1 tablet by mouth nightly  Dulaglutide 1.5 MG/0.5ML SOPN, Inject 1.5 mg into the skin once a week  Misc.  Devices (PULSE OXIMETER) MISC, 1 each by Does not apply route every 6-8 hours as needed (shortness of breath)  Nasal Dilators (BREATHE RIGHT EXTRA STRENGTH) STRP, 1 strip by Does not apply route nightly as needed (nasal congestion)  albuterol sulfate HFA (PROVENTIL HFA) 108 (90 Base) MCG/ACT inhaler, Inhale 2 puffs into the lungs every 4 hours as needed for Wheezing  insulin glargine (LANTUS SOLOSTAR) 100 UNIT/ML injection pen, Inject 10 Units into the skin every morning   glycopyrrolate-formoterol (BEVESPI AEROSPHERE) 9-4.8 MCG/ACT AERO, Inhale 2 puffs into the lungs 2 times daily  glucose (GLUTOSE) 40 % GEL, Take 37.5 mLs by mouth as needed (low blood sugar)  Insulin Pen Needle 29G X 12.7MM MISC, 1 each by Does not apply route daily  insulin lispro, 1 Unit Dial, 100 UNIT/ML SOPN, Inject 0-6 Units into the skin 3 times daily (with meals) **Corrective Low Dose Algorithm** Glucose: Dose:              No Insulin 140-199 1 Unit 200-249 2 Units 250-299 3 Units 300-349 4 Units 350-399 5 Units Over 399 6 Units     Inpatient Medications:  Scheduled Meds:   heparin (porcine)  5,000 Units SubCUTAneous 3 times per day    sodium chloride flush  10 mL IntraVENous 2 times per day    insulin lispro  0-12 Units SubCUTAneous TID WC    insulin lispro  0-6 Units SubCUTAneous Nightly    ALPRAZolam  0.75 mg Oral 4 times per day    amLODIPine  10 mg Oral Daily    aspirin  81 mg Oral Daily    atorvastatin  40 mg Oral Nightly    buPROPion  150 mg Oral QAM    [START ON 1/12/2022] cloNIDine  1 patch TransDERmal Weekly    furosemide  10 mg Oral Daily    insulin glargine  20 Units SubCUTAneous QAM    lisinopril  40 mg Oral Daily    pregabalin  75 mg Oral Nightly    propranolol  80 mg Oral Daily    spironolactone  50 mg Oral Daily    tiotropium-olodaterol  2 puff Inhalation Daily     Continuous Infusions:   dextrose       PRN Meds:.sodium chloride flush, dextrose, dextrose, glucagon (rDNA), glucose, acetaminophen, albuterol sulfate HFA, benzonatate, heparin (porcine), hydrALAZINE, hydrOXYzine, ondansetron, traMADol, traZODone    Allergies: Amoxicillin, Levofloxacin, Vancomycin, and Tape [adhesive tape]    REVIEW OF SYSTEMS:  As mentioned in HPI and CC  All other 10-point review of systems: negative.             PHYSICAL EXAM:  Patient Vitals for the past 24 hrs:   BP Temp Temp src Pulse Resp SpO2 Height Weight   01/07/22 0841 109/65 98.2 °F (36.8 °C) Oral 84 15 95 % -- --   01/07/22 0533 -- -- -- -- -- 96 % -- --   01/07/22 0515 -- -- -- -- -- -- -- 175 lb 14.4 oz (79.8 kg)   01/06/22 1945 134/78 97.6 °F (36.4 °C) Oral 82 16 95 % 5' 7\" (1.702 m) 175 lb 14.4 oz (79.8 kg)       Intake/Output Summary (Last 24 hours) at 1/7/2022 0940  Last data filed at 1/7/2022 0856  Gross per 24 hour   Intake 10 ml   Output --   Net 10 ml        Physical Exam  Vitals reviewed. Constitutional:       General: She is not in acute distress. Appearance: Normal appearance. She is ill-appearing. Comments: Obese body habitus   HENT:      Head: Normocephalic and atraumatic. Right Ear: External ear normal.      Left Ear: External ear normal.      Nose: Nose normal.      Mouth/Throat:      Mouth: Mucous membranes are moist. Mucous membranes are not dry. Eyes:      General: No scleral icterus. Conjunctiva/sclera: Conjunctivae normal.   Neck:      Thyroid: No thyroid mass. Vascular: No JVD. Trachea: Trachea normal.   Cardiovascular:      Rate and Rhythm: Normal rate and regular rhythm. Heart sounds: S1 normal and S2 normal.   Pulmonary:      Effort: Pulmonary effort is normal. No respiratory distress. Breath sounds: Normal breath sounds. Abdominal:      General: Bowel sounds are normal. There is no distension. Palpations: Abdomen is soft. Musculoskeletal:         General: Swelling (mild) present. No deformity. Cervical back: Normal range of motion and neck supple. Skin:     General: Skin is warm and dry. Coloration: Skin is not jaundiced. Neurological:      Mental Status: She is alert and oriented to person, place, and time. Mental status is at baseline.    Psychiatric:         Mood and Affect: Mood normal.         Behavior: Behavior normal.         DATA:  Diagnostic tests reviewed for today's visit:    Recent Labs     01/04/22  1308 01/05/22  0625 01/06/22  0722   WBC 13.6* 15.1* 11.3*   HCT 36.5 33.0* 33.3*    see below 209     Iron Saturation:  No components found for: PERCENTFE  FERRITIN:    Lab Results   Component Value Date    FERRITIN 112.0 08/28/2021     IRON:    Lab Results   Component Value Date    IRON 36 01/04/2022     TIBC:    Lab Results   Component Value Date    TIBC 299 01/04/2022       Recent Labs     01/04/22  1309 01/05/22  0625 01/06/22  0722   * 132* 133*   K 4.3 4.5 4.3   CL 94* 96* 95*   CO2 19* 18* 22   BUN 58* 58* 26*   CREATININE 8.4* 8.8* 5.5*     Recent Labs     01/04/22  1309 01/05/22  0625 01/06/22  0722   CALCIUM 8.7 8.5 8.7     No results for input(s): PH, PCO2, PO2 in the last 72 hours. Invalid input(s): Segun Arteaga    ABG:  No results found for: PH, PCO2, PO2, HCO3, BE, THGB, TCO2, O2SAT  VBG:    Lab Results   Component Value Date    PHVEN 7.302 11/02/2021    PUP0AYD 60.7 11/02/2021    BEVEN 2.6 11/02/2021    Y3RGGBYO 100 11/02/2021           BELOW MENTIONED RADIOLOGY STUDY RESULTS REVIEWED BY ME:    CT Head WO Contrast    Result Date: 1/4/2022  EXAMINATION: CT OF THE HEAD WITHOUT CONTRAST  1/4/2022 12:38 pm TECHNIQUE: CT of the head was performed without the administration of intravenous contrast. Dose modulation, iterative reconstruction, and/or weight based adjustment of the mA/kV was utilized to reduce the radiation dose to as low as reasonably achievable. COMPARISON: None. HISTORY: ORDERING SYSTEM PROVIDED HISTORY: Frequent falls TECHNOLOGIST PROVIDED HISTORY: Reason for exam:->Frequent falls Has a \"code stroke\" or \"stroke alert\" been called? ->No Decision Support Exception - unselect if not a suspected or confirmed emergency medical condition->Emergency Medical Condition (MA) Is the patient pregnant?->No Reason for Exam: frequent falls , hx cva FINDINGS: BRAIN/VENTRICLES: There is no acute intracranial hemorrhage, mass effect or midline shift. No abnormal extra-axial fluid collection. The gray-white differentiation is maintained without evidence of an acute infarct. There is no evidence of hydrocephalus. ORBITS: The visualized portion of the orbits demonstrate no acute abnormality. SINUSES: The visualized paranasal sinuses and mastoid air cells demonstrate no acute abnormality. SOFT TISSUES/SKULL:  No acute abnormality of the visualized skull or soft tissues. No acute intracranial abnormality. RECOMMENDATIONS: Unavailable     CT HEAD WO CONTRAST    Result Date: 12/27/2021  EXAMINATION: CT OF THE HEAD WITHOUT CONTRAST  12/27/2021 4:50 pm TECHNIQUE: CT of the head was performed without the administration of intravenous contrast. Dose modulation, iterative reconstruction, and/or weight based adjustment of the mA/kV was utilized to reduce the radiation dose to as low as reasonably achievable. COMPARISON: 08/27/2021 HISTORY: ORDERING SYSTEM PROVIDED HISTORY: falls TECHNOLOGIST PROVIDED HISTORY: Has a \"code stroke\" or \"stroke alert\" been called? ->No Reason for exam:->falls Decision Support Exception - unselect if not a suspected or confirmed emergency medical condition->Emergency Medical Condition (MA) Is the patient pregnant?->No Reason for Exam: Fall, Fall (fell at dialysis today, was putting her shoe on and fell, unsure if she hit her head, no LOC, denies pain anywhere). FINDINGS: BRAIN/VENTRICLES: There is no acute intracranial hemorrhage, mass effect or midline shift. No abnormal extra-axial fluid collection. The gray-white differentiation is maintained without evidence of an acute infarct. There is no evidence of hydrocephalus. Patchy hypodensities in the periventricular and subcortical white matter, which are nonspecific, but may represent chronic small vessel ischemic change. ORBITS: The visualized portion of the orbits demonstrate no acute abnormality. SINUSES: The visualized paranasal sinuses and mastoid air cells demonstrate no acute abnormality. SOFT TISSUES/SKULL:  No acute abnormality of the visualized skull or soft tissues. No acute intracranial abnormality. CT CERVICAL SPINE WO CONTRAST    Result Date: 12/27/2021  EXAMINATION: CT OF THE CERVICAL SPINE WITHOUT CONTRAST 12/27/2021 4:49 pm TECHNIQUE: CT of the cervical spine was performed without the administration of intravenous contrast. Multiplanar reformatted images are provided for review. Dose modulation, iterative reconstruction, and/or weight based adjustment of the mA/kV was utilized to reduce the radiation dose to as low as reasonably achievable. COMPARISON: None. HISTORY: ORDERING SYSTEM PROVIDED HISTORY: fall TECHNOLOGIST PROVIDED HISTORY: Reason for exam:->fall Decision Support Exception - unselect if not a suspected or confirmed emergency medical condition->Emergency Medical Condition (MA) Is the patient pregnant?->No Reason for Exam: Fall, Fall (fell at dialysis today, was putting her shoe on and fell, unsure if she hit her head, no LOC, denies pain anywhere). FINDINGS: BONES/ALIGNMENT: There is mild disc space narrowing throughout. There is no acute fracture or traumatic malalignment. DEGENERATIVE CHANGES: No significant degenerative changes. SOFT TISSUES: There is no prevertebral soft tissue swelling. There is a 2.5 cm hypodensity left lobe of thyroid gland inferiorly. There is a right IJ catheter in place. The lung apices are clear. Mild degenerative disc space narrowing throughout with no acute abnormality seen. 2.5 cm hypodensity left lobe of the thyroid gland. Suggest ultrasound follow-up. RECOMMENDATIONS: Unavailable     CT THORACIC SPINE WO CONTRAST    Result Date: 12/27/2021  EXAMINATION: CT OF THE THORACIC SPINE WITHOUT CONTRAST  12/27/2021 4:50 pm: TECHNIQUE: CT of the thoracic spine was performed without the administration of intravenous contrast. Multiplanar reformatted images are provided for review. Dose modulation, iterative reconstruction, and/or weight based adjustment of the mA/kV was utilized to reduce the radiation dose to as low as reasonably achievable. COMPARISON: None. HISTORY: ORDERING SYSTEM PROVIDED HISTORY: fall TECHNOLOGIST PROVIDED HISTORY: Reason for exam:->fall Is the patient pregnant?->No Reason for Exam: Fall, Fall (fell at dialysis today, was putting her shoe on and fell, unsure if she hit her head, no LOC, denies pain anywhere). FINDINGS: BONES/ALIGNMENT: There is mild scoliotic curvature of the thoraco lumbar spine, with dextroconvexity. .  The vertebral body heights are maintained. No osseous destructive lesion is seen. DEGENERATIVE CHANGES: Anterolateral spurring is noted at multiple levels. There is no significant narrowing of the central canal. SOFT TISSUES: No paraspinal mass is seen. No acute fracture or subluxation of the thoracic spine. Multilevel spondylosis the RECOMMENDATIONS: Unavailable     CT LUMBAR SPINE WO CONTRAST    Result Date: 12/27/2021  EXAMINATION: CT OF THE LUMBAR SPINE WITHOUT CONTRAST  12/27/2021 TECHNIQUE: CT of the lumbar spine was performed without the administration of intravenous contrast. Multiplanar reformatted images are provided for review. Adjustment of mA and/or kV according to patient size was utilized. Dose modulation, iterative reconstruction, and/or weight based adjustment of the mA/kV was utilized to reduce the radiation dose to as low as reasonably achievable. COMPARISON: None HISTORY: ORDERING SYSTEM PROVIDED HISTORY: fall TECHNOLOGIST PROVIDED HISTORY: Reason for exam:->fall Decision Support Exception - unselect if not a suspected or confirmed emergency medical condition->Emergency Medical Condition (MA) Is the patient pregnant?->No Reason for Exam: Fall, Fall (fell at dialysis today, was putting her shoe on and fell, unsure if she hit her head, no LOC, denies pain anywhere). FINDINGS: BONES/ALIGNMENT: There is normal alignment of the spine. The vertebral body heights are maintained. No osseous destructive lesion is seen. DEGENERATIVE CHANGES: Mild facet arthropathy.   Anterior marginal endplate spurs most pronounced at L1-L2. Otherwise minimal disc space disease identified. SOFT TISSUES/RETROPERITONEUM: No paraspinal mass is seen. There is a borderline retroperitoneal lymph nodes up to 9 mm short axis dimension. No evidence acute fracture traumatic malalignment of the lumbar spine. Borderline/upper limits normal lymph nodes within the retroperitoneum 9 mm short axis dimension. Please correlate with history and laboratory testing. RECOMMENDATIONS: Unavailable     XR CHEST PORTABLE    Result Date: 1/4/2022  EXAMINATION: ONE XRAY VIEW OF THE CHEST 1/4/2022 12:25 pm COMPARISON: 12/27/2021 HISTORY: ORDERING SYSTEM PROVIDED HISTORY: Frequent falls TECHNOLOGIST PROVIDED HISTORY: Reason for exam:->Frequent falls Reason for Exam: Fall (Pt reports frequent falls FINDINGS: Right-sided central venous catheter remains in place. The lungs are without acute focal process. There is no effusion or pneumothorax. The cardiomediastinal silhouette is stable. The osseous structures are stable. No acute process. XR CHEST PORTABLE    Result Date: 12/27/2021  EXAMINATION: ONE X-RAY VIEW OF THE CHEST 12/27/2021 3:41 pm COMPARISON: November 2, 2021 HISTORY: ORDERING SYSTEM PROVIDED HISTORY:  Falls TECHNOLOGIST PROVIDED HISTORY: Reason for Exam:  Falls Reason for Exam:  Falls FINDINGS: The cardiomediastinal silhouette is enlarged but stable. Right-sided central venous catheter extends to the right atrium. Lung volumes are low. No evidence of acute pulmonary edema or acute infiltrate. No pleural effusion or pneumothorax. Stable cardiomegaly. No acute cardiopulmonary process. XR HIP 2-3 VW W PELVIS RIGHT    Result Date: 1/4/2022  EXAMINATION: ONE XRAY VIEW OF THE PELVIS AND TWO XRAY VIEWS RIGHT HIP 1/4/2022 2:07 pm COMPARISON: None.  HISTORY: ORDERING SYSTEM PROVIDED HISTORY:  Fall TECHNOLOGIST PROVIDED HISTORY: Reason For Exam:   Fall FINDINGS: Frontal view of the pelvis and dedicated imaging of the right hip demonstrate no acute fracture or dislocation. Normal bony mineralization. No significant bilateral hip osteoarthritis. SI joints and sacral arcuate lines appear intact. Evaluation is mildly limited by overlying bowel gas and stool. No soft tissue abnormality. 1. No acute osseous abnormality of the pelvis and right hip.

## 2022-01-08 LAB
BLOOD CULTURE, ROUTINE: NORMAL
CULTURE, BLOOD 2: NORMAL
GLUCOSE BLD-MCNC: 109 MG/DL (ref 70–99)
GLUCOSE BLD-MCNC: 115 MG/DL (ref 70–99)
GLUCOSE BLD-MCNC: 160 MG/DL (ref 70–99)
GLUCOSE BLD-MCNC: 178 MG/DL (ref 70–99)
PERFORMED ON: ABNORMAL

## 2022-01-08 PROCEDURE — 94760 N-INVAS EAR/PLS OXIMETRY 1: CPT

## 2022-01-08 PROCEDURE — 97530 THERAPEUTIC ACTIVITIES: CPT

## 2022-01-08 PROCEDURE — 6370000000 HC RX 637 (ALT 250 FOR IP): Performed by: PHYSICAL MEDICINE & REHABILITATION

## 2022-01-08 PROCEDURE — 97130 THER IVNTJ EA ADDL 15 MIN: CPT

## 2022-01-08 PROCEDURE — 1280000000 HC REHAB R&B

## 2022-01-08 PROCEDURE — 97116 GAIT TRAINING THERAPY: CPT

## 2022-01-08 PROCEDURE — 97129 THER IVNTJ 1ST 15 MIN: CPT

## 2022-01-08 PROCEDURE — 94640 AIRWAY INHALATION TREATMENT: CPT

## 2022-01-08 PROCEDURE — 97535 SELF CARE MNGMENT TRAINING: CPT

## 2022-01-08 PROCEDURE — 2580000003 HC RX 258: Performed by: PHYSICAL MEDICINE & REHABILITATION

## 2022-01-08 PROCEDURE — 6360000002 HC RX W HCPCS: Performed by: PHYSICAL MEDICINE & REHABILITATION

## 2022-01-08 RX ADMIN — LISINOPRIL 40 MG: 20 TABLET ORAL at 08:35

## 2022-01-08 RX ADMIN — ASPIRIN 81 MG: 81 TABLET, COATED ORAL at 08:35

## 2022-01-08 RX ADMIN — BUPROPION HYDROCHLORIDE 150 MG: 150 TABLET, EXTENDED RELEASE ORAL at 08:35

## 2022-01-08 RX ADMIN — ALPRAZOLAM 0.75 MG: 0.5 TABLET ORAL at 04:02

## 2022-01-08 RX ADMIN — HEPARIN SODIUM 5000 UNITS: 5000 INJECTION INTRAVENOUS; SUBCUTANEOUS at 15:22

## 2022-01-08 RX ADMIN — HEPARIN SODIUM 5000 UNITS: 5000 INJECTION INTRAVENOUS; SUBCUTANEOUS at 22:17

## 2022-01-08 RX ADMIN — INSULIN LISPRO 1 UNITS: 100 INJECTION, SOLUTION INTRAVENOUS; SUBCUTANEOUS at 20:51

## 2022-01-08 RX ADMIN — SPIRONOLACTONE 50 MG: 25 TABLET ORAL at 08:35

## 2022-01-08 RX ADMIN — PROPRANOLOL HYDROCHLORIDE 80 MG: 80 CAPSULE, EXTENDED RELEASE ORAL at 08:35

## 2022-01-08 RX ADMIN — AMLODIPINE BESYLATE 10 MG: 5 TABLET ORAL at 08:35

## 2022-01-08 RX ADMIN — ALPRAZOLAM 0.75 MG: 0.5 TABLET ORAL at 11:47

## 2022-01-08 RX ADMIN — INSULIN LISPRO 2 UNITS: 100 INJECTION, SOLUTION INTRAVENOUS; SUBCUTANEOUS at 11:47

## 2022-01-08 RX ADMIN — TIOTROPIUM BROMIDE AND OLODATEROL 2 PUFF: 3.124; 2.736 SPRAY, METERED RESPIRATORY (INHALATION) at 08:10

## 2022-01-08 RX ADMIN — ATORVASTATIN CALCIUM 40 MG: 40 TABLET, FILM COATED ORAL at 20:41

## 2022-01-08 RX ADMIN — PREGABALIN 75 MG: 75 CAPSULE ORAL at 20:41

## 2022-01-08 RX ADMIN — SODIUM CHLORIDE, PRESERVATIVE FREE 10 ML: 5 INJECTION INTRAVENOUS at 08:34

## 2022-01-08 RX ADMIN — INSULIN GLARGINE 20 UNITS: 100 INJECTION, SOLUTION SUBCUTANEOUS at 08:36

## 2022-01-08 RX ADMIN — FUROSEMIDE 10 MG: 20 TABLET ORAL at 08:35

## 2022-01-08 RX ADMIN — HEPARIN SODIUM 5000 UNITS: 5000 INJECTION INTRAVENOUS; SUBCUTANEOUS at 05:58

## 2022-01-08 ASSESSMENT — PAIN SCALES - GENERAL
PAINLEVEL_OUTOF10: 0

## 2022-01-08 ASSESSMENT — PAIN DESCRIPTION - INTENSITY
RATING_2: 0

## 2022-01-08 ASSESSMENT — PULMONARY FUNCTION TESTS: PEFR_L/MIN: 82

## 2022-01-08 NOTE — PROGRESS NOTES
ACUTE REHAB UNIT  SPEECH/LANGUAGE PATHOLOGY      [x] Daily  [] Weekly Care Conference Note  [] Discharge    Patient:Kristine Santoro     TONA:[birth year]  OXK:[id number]  Room #: SANTOS-2509/2578-97   Rehab Dx/Hx: Recurrent falls [R29.6]  Date of Admit: 1/6/2022      Precautions: falls  Home situation: Lives at home with her ; Not an active ;  completes finances and places pills into pill organizer for pt)   ST Dx: Cognitive Linguistic Deficits     Daily Treatment Info:   Date: 1/8/2022     Tx session 1   Pain Denied    Subjective     Pt positioned upright in bed consuming breakfast meal (needed some assistance with set up and locating utensils on tray). Pt pleasant and cooperative, became tearful when talking about passing of her mom/ dad/ POPPY and MIL. Stated she didn't think she was receiving medication that helps her sleep (informed RN -- RN to leave sticky note to MD). Objective:  Goals    Pt will complete word associative tasks to improve mental flexibility, complex thinking, and working memory skills with 90% accuracy. - No instances of word finding or reduced thought organization noted during conversation       Pt will complete executive function tasks (i.e. planning, deductive reasoning, inferential, functional organization tasks) with 80% accuracy independently. - Pt unable to come up with name of medication that helps her sleep with min cues pt stated she would call   - Despite visual deficits pt able to navigate cell phone Independently to make call   - Able to recall name of medication/ dosage she takes after talking to her  (Fluoxetine/ 100 mg)   - Required encouragement to tell MD about medication she feels like she isn't getting at this time   - Reviewed daily schedule with pt   - Pt able to locate call button Independently    Continue to monitor speech sound production with additional goals to be added as warranted.    - Slow speech production but 100% intelligible Other areas targeted:    Education:   Provided education regarding rationale for tasks and plan of care. Pt verbalized understanding    Safety Devices: [x] Call light within reach  [] Chair alarm activated  [x] Bed alarm activated  [] Other:     Assessment:   Speech Therapy Diagnosis  Cognitive Diagnosis: Pt presents with mild cognitive linguistic deficits in the domains of thought organization, working memory and attention. Pt easily stimulable to correct errors given extended time or repetitions. Some errors were evident due to visual deficits. Pt appropriately aware of her fall risks and effects of stroke. Speech Diagnosis: Pt with slow speech production but intelligible for structured conversation. Pt reported she uses slow speech to compensate and sound more clear, stated she used to talk fast. Mild to moderately hoarse vocal quality was attributed to vocal fold paralysis. Pt declined any concerns with her current speech sound production. Current Diet Order: ADULT DIET; Regular; 4 carb choices (60 gm/meal)            Plan:     Frequency:  5days/week   30 minutes/day    Discharge Recommendations:   Barriers: Visual deficits (legally blind)   Discharge Recommendations:  [] Home independently  [x] Home with assistance []  24 hour supervision  [] SNF [] Other:  Continued SLP Treatment:  [x] Yes [] No [] TBD based on progress while on ARU [] Vital Stim indicated [] Other:   Estimated discharge date: TBD      Session 1 Times: Individual Concurrent Group Co-treatment   Time In 0830         Time Out 0900         Minutes 30         Time Code Minutes  30        Timed Code Treatment Minutes:  30    Total Treatment Minutes:  30    Electronically Signed by     Kari MARTIN CCC-SLP #[id number] 1/8/2022 11:51 AM  Speech-Language Pathologist

## 2022-01-08 NOTE — PROGRESS NOTES
with [facility] elevated          Orientation  Orientation  Orientation Level: Oriented to place;Oriented to situation;Oriented to person;Disoriented to time  Cognition   Cognition  Arousal/Alertness: Delayed responses to stimuli  Following Commands: Follows one step commands consistently  Attention Span: Attends with cues to redirect; Difficulty dividing attention  Memory: Appears intact  Safety Judgement: Decreased awareness of need for assistance  Problem Solving: Assistance required to generate solutions;Assistance required to implement solutions  Insights: Decreased awareness of deficits  Initiation: Requires cues for some  Sequencing: Requires cues for some  Objective   Bed mobility  Supine to Sit: Contact guard assistance  Scooting: Contact guard assistance  Transfers  Sit to Stand: Minimal Assistance;Contact guard assistance (mostly min A with 3 instances of CGA)  Stand to sit: Minimal Assistance (decreased control, cues for safety)  Stand Pivot Transfers: Minimal Assistance  Car Transfer: Minimal Assistance  Ambulation  Ambulation?: Yes  Ambulation 1  Device: Rolling Walker  Assistance: Minimal assistance (min A with 1 occurance of max A needed to correct R lean and mod A for 1 occurance, both when pt fatigued.)  Quality of Gait: narrow ROMAINE, decreased bilat stride length and decreased foot clearance; very slow noble with random bursts of increased speed for 3-4 steps with anterior lean and momentum. Sudden R lean with fatigue. Distance: 77 ft + 35 ft  Stairs/Curb  Stairs?: Yes  Stairs  # Steps : 4  Rails: Bilateral  Curbs: 6\"  Device: No Device  Assistance:  Moderate assistance  Comment: step to pattern leading with LLE to ascend, RLE to descend     Balance  Posture: Poor  Sitting - Static: Poor;+  Sitting - Dynamic: +;Poor  Standing - Static: Poor;+  Standing - Dynamic: Poor  Comments: progressive posterior lean while seated edge of mat, occasionally to the R. progressive R lean while standing, especially when distracted. Can self correct sometimes, but often requires cues. Comment: pt completed functional mobility noted above. Additionally, pt completed step up/down RLE x 5 with bilat UE suport and repeat sit to stands x 5 with focus on upright posture upon standing and controlled descent. Pt completed static sitting on mat table using mirror for visual feedback, focusing on upright posture without posterior or R lean. Pt completed 5 reaches forward to target to ellicit hip hinge needed for upright sitting (vs posterior lean), then repeated upright sitting focus with mirror for feedback. Pt completed ball toss activity with yellow ball for reactive balance x 12 throws, then cross body reach to retrieve cones and place on opposite side of body. Severe postior lean during this activity. While ambulating back to the room, pt reported seeing a gorilla play a cello outside the dining room window. Goals  Short term goals  Time Frame for Short term goals: 2 weeks  Short term goal 1: Pt to complete bed mobility at modified independent level with use of bed rail. Short term goal 2: Pt to complete transfers at [facility]  Short term goal 3: Pt to ambulate 150 ft with RW at [facility]  Short term goal 4: Pt to ascend/descend 4 steps with (B) HR at [facility]  Short term goal 5: Pt to complete car transfer at [facility]  Patient Goals   Patient goals : To reduce falling episodes    Plan    Plan  Times per week: 5x/week, 75 min/day  Current Treatment Recommendations: Strengthening,Balance Training,Functional Mobility Training,Transfer Training,ADL/Self-care Training,Stair training,Endurance Training,Gait Training,Neuromuscular Re-education,Positioning,Modalities,Patient/Caregiver Education & Training,Safety Education & Training  Safety Devices  Type of devices:  All fall risk precautions in place,Call light within reach,Gait belt,Chair alarm in place,Left in chair  Restraints  Initially in place: No     Therapy Time   Individual Concurrent Group Co-treatment   Time In 0980         Time Out 1110         Minutes 75         Timed Code Treatment Minutes: [address] [place], [place] [id number]

## 2022-01-08 NOTE — PROGRESS NOTES
Occupational Therapy  Facility/Department: [facility] ACUTE REHAB UNIT  Daily Treatment Note  NAME: Robert Arguelles  : 1975  MRN: [id number]    Date of Service: 2022    Discharge Recommendations:  Home with Home health OT,24 hour supervision or assist  OT Equipment Recommendations  Other: owns grab bars, shower chair    Assessment   Performance deficits / Impairments: Decreased functional mobility ; Decreased ADL status; Decreased endurance;Decreased balance;Decreased vision/visual deficit; Decreased safe awareness  Assessment: Pt is well below her baseline level of occupation function, based on the above deficits associated with ESRD. Pt has a chronic visual deficit. Pt would benefit from continued skilled acute OT services to address these deficits. Treatment Diagnosis: Decreased ADL status, functional mobility, endurance, balance, and safety awareness associated with ESRD  Prognosis: Good  History: Pt lives w/spouse, previously S level w/ADLs, multiple falls PTA  OT Education: OT Role;Plan of Care;Precautions; ADL Adaptive Strategies;Transfer Training  Patient Education: continue to reinforce for carryover  Barriers to Learning: Visual  REQUIRES OT FOLLOW UP: Yes  Activity Tolerance  Activity Tolerance: Patient Tolerated treatment well;Patient limited by fatigue  Activity Tolerance: closing eyes frequently, agreeable to continued activity, requires increased time to complete tasks and benefits from rest breaks after standing  Safety Devices  Safety Devices in place: Yes  Type of devices: All fall risk precautions in place;Call light within reach;Gait belt;Nurse notified; Left in chair;Chair alarm in place         Patient Diagnosis(es): There were no encounter diagnoses.       has a past medical history of Blind in both eyes, Cerebral artery occlusion with cerebral infarction [facility]), CHF (congestive heart failure) ([facility] Chronic kidney disease, Depression, Diabetes mellitus out of control ([facility] Diabetes mellitus, type II ([facility] Diabetic neuropathy associated with type 2 diabetes mellitus ([facility] Generalized headaches, Hypertension, Infertility, Insomnia, Migraine headache, Mixed hyperlipidemia, Otitis media, Pelvic abscess in female, Pneumonia, Stroke [facility]), and Stroke ([facility] has a past surgical history that includes Cervix surgery; eye surgery; Foot surgery (Right); Foot surgery (Bilateral); Foot surgery (Left); and IR TUNNELED CVC PLACE WO SQ PORT/PUMP > 5 YEARS (9/7/2021). Restrictions  Restrictions/Precautions  Restrictions/Precautions: Fall Risk  Required Braces or Orthoses?: No  Position Activity Restriction  Other position/activity restrictions: 77-year-old female with a history of ESRD on HD, diabetes, diabetic neuropathy, legal blindness, CVA with resultant right hemiparesis, HTN, and HLD who was admitted on 1/4 with recurrent falls. Due to increasing difficulty with ambulation and transfers, she had missed 2 dialysis sessions prior to admission. She was recently discharged on 12/29 for an admission with generalized weakness and falls consistent with this admission. She scored well enough with therapy evaluations to discharge to home however it does not appear she is able to care for herself with the assistance of her . She was evaluated by therapy and suggested to continue in an inpatient setting prior to returning home. Patient reports that she had her initial stroke in August and discharged to home with outpatient therapies and subsequently her second stroke in late 2021 resulted in her discharging to [address]. She felt that her therapies were not as effective or intensive as her outpatient therapies. She reports no current complaints. Subjective   General  Chart Reviewed: Yes  Patient assessed for rehabilitation services?: Yes  Family / Caregiver Present: No  Diagnosis: ESRD  Subjective  Subjective: Pt in chair upon arrival, finishing lunch.  Denies pain at rest, agreeable to tx  Vital Signs  Patient Currently in Pain: Denies   Orientation  Orientation  Overall Orientation Status: Impaired  Orientation Level: Oriented to place;Oriented to situation;Disoriented to time;Oriented to person  Objective    ADL  Feeding: Setup  Grooming: Minimal assistance (set up to complete oral hygiene with increased time and rest break d/t fatigue in stance at sink, min A for thoroughness with washing face, mod A for brushing/detangling hair once washed)  Additional Comments: Ambulated from recliner > bathroom sink for oral hygiene in stance--fatigues quickly with (R) side lean noted when attending to grooming task. Requires seated rest break prior to finishing remainder of oral hygiene in stance. Seated EOB unsupported ~40 min to detangle and brush hair after being washed, requires min-mod VC for correcting posterior lean while seated unsupported at EOB        Balance  Sitting Balance: Stand by assistance (min-mod VC for anterior lean seated EOB ~40 min, begins to self correct lean on own but does fatigue as task progresses)  Standing Balance: Moderate assistance (d/t (R) side lean, pt able to self correct with assist and VC)  Standing Balance  Time: 4 min, 3 min  Activity: stance at sink for oral hygiene, (R) lean noted in stance with dual tasks  Functional Mobility  Functional - Mobility Device: Rolling Walker  Activity: To/from bathroom  Assist Level: Minimal assistance     Transfers  Sit to stand: Minimal assistance (x4; from recliner x1 (min A), from w/c x2 (min > CGA), from EOB (CGA))  Stand to sit: Minimal assistance (x4; to w/c x2, EOB, recliner)            Cognition  Overall Cognitive Status: Exceptions  Arousal/Alertness: Delayed responses to stimuli  Following Commands: Follows one step commands consistently  Attention Span: Attends with cues to redirect; Difficulty dividing attention  Memory: Appears intact  Safety Judgement: Decreased awareness of need for assistance  Problem Solving: Assistance required to generate solutions;Assistance required to implement solutions  Insights: Decreased awareness of deficits  Initiation: Requires cues for some  Sequencing: Requires cues for some  Cognition Comment: Balance deficits and safety appear to be limited by deficits in dual tasking - i.e. leaning significantly to R side during prolonged standing during washing hands.  Reports seeing \"slugs all over the place\" and endorses other episodes of hallucinations, but reports that she is aware they are not real. Pt is easily distracted but able to be redirected     Perception  Overall Perceptual Status: Impaired  Unilateral Attention: Cues to maintain midline in sitting;Cues to maintain midline in standing ((R) side lean in stance, posterior lean seated EOB)            Plan   Plan  Times per week: 75 minutes 5 days per week  Times per day: Daily  Current Treatment Recommendations: Safety Education & Training,Self-Care / ADL,Endurance Training,Functional Mobility Training,Balance Training  G-Code     OutComes Score                                                  AM-PAC Score             Goals  Short term goals  Time Frame for Short term goals: 2 weeks  Short term goal 1: SBA functional transfers to toilet and shower  Short term goal 2: SBA UB ADLs  Short term goal 3: min A LB ADLs  Short term goal 4: CGA functional mobility for ADLs  Patient Goals   Patient goals : to get stronger, stop falling       Therapy Time   Individual Concurrent Group Co-treatment   Time In  1245        Time Out  1415        Minutes  90           Timed Code Treatment Minutes:  90 minutes    Total Treatment Minutes:  90 minutes    Johnathan Renae OTR/L [id number]    Abby Castle OT     Time correction completed to reflect above therapist tx time category: All Kurtz OTR/L #[id number]

## 2022-01-08 NOTE — PROGRESS NOTES
Assessment complete see flowsheets. A&Ox4. Meds given per eMAR. Denies pain. The care plan and education has been reviewed and mutually agreed upon with the patient. In bed, fall precautions in place, hourly rounding, call light and belongings in reach, bed in lowest position, wheels locked in place, side rails up x 3, walkways free of clutter. No further needs expressed at this time. 06:45am Camera added for safety due to patient attempting to get out of bed without calling for assistance. Reminded and educated patient the importance of preventing falls and the reason for the camera. Patient having multiple hallucinations overnight (see previous progress notes from other hallucination encounters) patient had since her stroke. Patient requested Xanax X 2 overnight expressing that they \"calm her\". No further needs expressed at this time.

## 2022-01-08 NOTE — PLAN OF CARE
Problem: Falls - Risk of:  Goal: Will remain free from falls  Description: Will remain free from falls  1/7/2022 2336 by Eugenie Mejia RN  Outcome: Ongoing  1/7/2022 1455 by Karri Renee RN  Outcome: Ongoing  Note: Fall TIP Education provided.    Goal: Absence of physical injury  Description: Absence of physical injury  1/7/2022 2336 by Eugenie Mejia RN  Outcome: Ongoing  1/7/2022 1455 by Karri Renee RN  Outcome: Ongoing

## 2022-01-08 NOTE — PLAN OF CARE
Problem: Falls - Risk of:  Goal: Will remain free from falls  Description: Will remain free from falls  1/8/2022 0925 by Lorraine Burrell RN  Outcome: Ongoing  Note: Education Provided as follows:  Family/Patient made aware of the tailored interventions falls plan using the TIPS TOOL.

## 2022-01-08 NOTE — PROGRESS NOTES
MD Kristian Brooks MD Dannette Coffer, MD                Office: [phone]                      Fax: [phone]          Inpatient  Progress Note:     PATIENT NAME: Suann Phoenix  : 1975  MRN: [id number]         IMPRESSION / RECOMMENDATION:       Admitted for:  Recurrent falls [R29.6]        1. ESRD on iHD: MWF , follows w/ Dr. Luis Murcia  - outpt HD center: [facility], Dr Luis Murcia   - recently started HD in 2021, during admission w/ ATN w/ KINZA on CKD-4, was lost for f/u,), had acute hypoxic respiratory failure, pneumonia - needing intubation in past   - missed HD x2  - encouraged compliance as if her solutes are better control, she may feel overall better    - so needed HD on admission during inpatient stay  - now admitted to ARU for inpatient rehab       HD monday      EDW listed 88 (but per pt 85 kg)    81 kg pre-HD  So minimal UF needed       2. Hypertension/Volume Status:  - BP HTN - hx of resistant HTN - slightly uncontrolled - f/u after HD + resume home meds   - EDW reported 88  -> 83 kg currently - so might be her new DW  - Na - chronic hypoNatremia - f/u w/ HD       3. Electrolytes/acid-base:  K: WNL  High AG metabolic acidosis: mild - f/u w/ HD      4. Anemia of renal failure: at goal  - RIA: w/ HD     5. BMD:  - Phos: follow iPTH outpt     D/W Pt, Dr Courtney Mcgrath        : Other supportive care :   - Check daily renal function panel with electrolytes-phosphorus  - Strict monitoring of I/Os, daily weight  - Renal feeds/diet  - Current medications reviewed. - Nephrotoxic medications have been discontinued. - Dose adjusted and appropriate.                             - Dose meds for eGFR <15 mL/min/1.73m2.               - Avoid heavy opioids due to renal failure - may use very low dose dilaudid / fentanyl with close monitoring of CNS and respiratory depression.             Other Problems:           Hospital Problems      Last Modified POA     Recurrent falls 1/6/2022 Yes             Please refer to orders. Multiple complex problems. High risk  Discussed with patient, and treatment team    Thank you for allowing me to participate in this patient's care. Please do not hesitate to contact me with any questions/concerns. We will follow along with you.           Ady Woodall MD  [facility] [address] [place]: [phone] or Via [employer]  Fax: [phone]     Time spent  ~ 35 minutes that included face-to-face meeting/discussion with patient, and treatment team (including primary/referring team and other consultants; included coordination of care with the treatment team; and review of patient's electronic medical records and ordering appropriates tests.          Subjective:  Seen resting in bed  No complaints-distress  BP slightly higher only    Vitals:    01/08/22 0830   BP: (!) 145/81   Pulse: 80   Resp: 16   Temp: 97.9 °F (36.6 °C)   SpO2: 93%       Awake, co-operative    Neck: Supple. no JVD. Cardiac: S1 and S2+, No pericardial rub. Chest: Normal respiratory effort,  mild Rales.  No rhonchi  Ext :  LE Edema +, no cynosis         Labs Reviewed  by me   Labs   Lab Results   Component Value Date    CREATININE 5.5 (HH) 01/06/2022    BUN 26 (H) 01/06/2022     (L) 01/06/2022    K 4.3 01/06/2022    CL 95 (L) 01/06/2022    CO2 22 01/06/2022     Lab Results   Component Value Date    WBC 11.3 (H) 01/06/2022    HGB 10.6 (L) 01/06/2022    HCT 33.3 (L) 01/06/2022    MCV 81.4 01/06/2022     01/06/2022       Dialysis Treatment and Prescription reviewed

## 2022-01-09 LAB
GLUCOSE BLD-MCNC: 125 MG/DL (ref 70–99)
GLUCOSE BLD-MCNC: 156 MG/DL (ref 70–99)
GLUCOSE BLD-MCNC: 175 MG/DL (ref 70–99)
GLUCOSE BLD-MCNC: 192 MG/DL (ref 70–99)
PERFORMED ON: ABNORMAL

## 2022-01-09 PROCEDURE — 94640 AIRWAY INHALATION TREATMENT: CPT

## 2022-01-09 PROCEDURE — 6370000000 HC RX 637 (ALT 250 FOR IP): Performed by: PHYSICAL MEDICINE & REHABILITATION

## 2022-01-09 PROCEDURE — 1280000000 HC REHAB R&B

## 2022-01-09 PROCEDURE — 94761 N-INVAS EAR/PLS OXIMETRY MLT: CPT

## 2022-01-09 PROCEDURE — 6360000002 HC RX W HCPCS: Performed by: PHYSICAL MEDICINE & REHABILITATION

## 2022-01-09 RX ADMIN — HEPARIN SODIUM 5000 UNITS: 5000 INJECTION INTRAVENOUS; SUBCUTANEOUS at 05:56

## 2022-01-09 RX ADMIN — ATORVASTATIN CALCIUM 40 MG: 40 TABLET, FILM COATED ORAL at 19:50

## 2022-01-09 RX ADMIN — PROPRANOLOL HYDROCHLORIDE 80 MG: 80 CAPSULE, EXTENDED RELEASE ORAL at 08:44

## 2022-01-09 RX ADMIN — LISINOPRIL 40 MG: 20 TABLET ORAL at 08:44

## 2022-01-09 RX ADMIN — ASPIRIN 81 MG: 81 TABLET, COATED ORAL at 08:44

## 2022-01-09 RX ADMIN — PREGABALIN 75 MG: 75 CAPSULE ORAL at 19:51

## 2022-01-09 RX ADMIN — ALPRAZOLAM 0.75 MG: 0.5 TABLET ORAL at 19:51

## 2022-01-09 RX ADMIN — INSULIN GLARGINE 20 UNITS: 100 INJECTION, SOLUTION SUBCUTANEOUS at 08:46

## 2022-01-09 RX ADMIN — BENZONATATE 200 MG: 100 CAPSULE ORAL at 19:50

## 2022-01-09 RX ADMIN — TIOTROPIUM BROMIDE AND OLODATEROL 2 PUFF: 3.124; 2.736 SPRAY, METERED RESPIRATORY (INHALATION) at 12:40

## 2022-01-09 RX ADMIN — FUROSEMIDE 10 MG: 20 TABLET ORAL at 08:44

## 2022-01-09 RX ADMIN — HEPARIN SODIUM 5000 UNITS: 5000 INJECTION INTRAVENOUS; SUBCUTANEOUS at 21:45

## 2022-01-09 RX ADMIN — HEPARIN SODIUM 5000 UNITS: 5000 INJECTION INTRAVENOUS; SUBCUTANEOUS at 15:57

## 2022-01-09 RX ADMIN — BUPROPION HYDROCHLORIDE 150 MG: 150 TABLET, EXTENDED RELEASE ORAL at 08:44

## 2022-01-09 RX ADMIN — INSULIN LISPRO 2 UNITS: 100 INJECTION, SOLUTION INTRAVENOUS; SUBCUTANEOUS at 16:30

## 2022-01-09 RX ADMIN — INSULIN LISPRO 2 UNITS: 100 INJECTION, SOLUTION INTRAVENOUS; SUBCUTANEOUS at 11:51

## 2022-01-09 RX ADMIN — INSULIN LISPRO 1 UNITS: 100 INJECTION, SOLUTION INTRAVENOUS; SUBCUTANEOUS at 19:56

## 2022-01-09 RX ADMIN — AMLODIPINE BESYLATE 10 MG: 5 TABLET ORAL at 08:44

## 2022-01-09 RX ADMIN — SPIRONOLACTONE 50 MG: 25 TABLET ORAL at 08:44

## 2022-01-09 ASSESSMENT — PAIN SCALES - GENERAL
PAINLEVEL_OUTOF10: 0
PAINLEVEL_OUTOF10: 0

## 2022-01-09 NOTE — PLAN OF CARE
Problem: Falls - Risk of:  Goal: Will remain free from falls  Description: Will remain free from falls  1/9/2022 1131 by Nola Sanchez, RN  Outcome: Ongoing  Note: Education Provided as follows:  Family/Patient made aware of the tailored interventions falls plan using the TIPS TOOL.

## 2022-01-09 NOTE — PROGRESS NOTES
Assessment complete see flowsheets. A&Ox4. Meds given per eMAR. Denies pain. The care plan and education has been reviewed and mutually agreed upon with the patient. In bed, fall precautions in place, hourly rounding, call light and belongings in reach, bed in lowest position, wheels locked in place, side rails up x 3, walkways free of clutter. No further needs expressed at this time. 06:49 Patient slept well overnight. No further needs expressed at this time.

## 2022-01-09 NOTE — PLAN OF CARE
Problem: Falls - Risk of:  Goal: Will remain free from falls  Description: Will remain free from falls  1/8/2022 2256 by Chuy Flores RN  Outcome: Ongoing  1/8/2022 0925 by Zahra Davis RN  Outcome: Ongoing  Note: Education Provided as follows:  Family/Patient made aware of the tailored interventions falls plan using the TIPS TOOL.    Goal: Absence of physical injury  Description: Absence of physical injury  Outcome: Ongoing

## 2022-01-09 NOTE — PROGRESS NOTES
MD Nilda Call MD Rheta Ban, MD                Office: [phone]                      Fax: [phone]          Inpatient  Progress Note:     PATIENT NAME: Donte Raygoza  : 1975  MRN: [id number]         IMPRESSION / RECOMMENDATION:       Admitted for:  Recurrent falls [R29.6]        1. ESRD on iHD: MWF , follows w/ Dr. Akila Mcgregor  - outpt HD center: [facility], Dr Akila Mcgregor   - recently started HD in 2021, during admission w/ ATN w/ KINZA on CKD-4, was lost for f/u,), had acute hypoxic respiratory failure, pneumonia - needing intubation in past   - missed HD x2  - encouraged compliance as if her solutes are better control, she may feel overall better    - so needed HD on admission during inpatient stay  - now admitted to ARU for inpatient rehab    HD monday     Labs MWF     EDW listed 88 (but per pt 85 kg)  81 kg pre-HD So minimal UF needed       2. Hypertension/Volume Status:  - BP HTN - hx of resistant HTN - slightly uncontrolled - f/u after HD + resume home meds   - EDW reported 88  -> 83 kg currently - so might be her new DW  - Na - chronic hypoNatremia - f/u w/ HD       3. Electrolytes/acid-base:  K: WNL  High AG metabolic acidosis: mild - f/u w/ HD      4. Anemia of renal failure: at goal  - RIA: w/ HD     5. BMD:  - Phos: follow iPTH outpt     D/W Pt, Dr Fatoumata Finnegan        : Other supportive care :   - Check daily renal function panel with electrolytes-phosphorus  - Strict monitoring of I/Os, daily weight  - Renal feeds/diet  - Current medications reviewed. - Nephrotoxic medications have been discontinued. - Dose adjusted and appropriate.                             - Dose meds for eGFR <15 mL/min/1.73m2.               - Avoid heavy opioids due to renal failure - may use very low dose dilaudid / fentanyl with close monitoring of CNS and respiratory depression.             Other Problems:           Hospital Problems         Last Modified POA     Recurrent falls 1/6/2022 Yes             Please refer to orders. Multiple complex problems. High risk  Discussed with patient, and treatment team    Thank you for allowing me to participate in this patient's care. Please do not hesitate to contact me with any questions/concerns. We will follow along with you.           Renita Baker MD  [facility] [address] [place]: [phone] or [facility] [employer]  Fax: [phone]     Time spent  ~ 35 minutes that included face-to-face meeting/discussion with patient, and treatment team (including primary/referring team and other consultants; included coordination of care with the treatment team; and review of patient's electronic medical records and ordering appropriates tests.          Subjective:  Seen resting in bed  No complaints-distress  BP slightly higher only    Vitals:    01/09/22 0841   BP: 136/79   Pulse: 85   Resp: 16   Temp: 98.1 °F (36.7 °C)   SpO2: 96%       Awake, co-operative    Neck: Supple. no JVD. Cardiac: S1 and S2+, No pericardial rub. Chest: Normal respiratory effort,  mild Rales.  No rhonchi  Ext :  LE Edema +, no cynosis         Labs Reviewed  by me   Labs   Lab Results   Component Value Date    CREATININE 5.5 (HH) 01/06/2022    BUN 26 (H) 01/06/2022     (L) 01/06/2022    K 4.3 01/06/2022    CL 95 (L) 01/06/2022    CO2 22 01/06/2022     Lab Results   Component Value Date    WBC 11.3 (H) 01/06/2022    HGB 10.6 (L) 01/06/2022    HCT 33.3 (L) 01/06/2022    MCV 81.4 01/06/2022     01/06/2022       Dialysis Treatment and Prescription reviewed

## 2022-01-10 LAB
ALBUMIN SERPL-MCNC: 3.3 G/DL (ref 3.4–5)
ANION GAP SERPL CALCULATED.3IONS-SCNC: 17 MMOL/L (ref 3–16)
BUN BLDV-MCNC: 61 MG/DL (ref 7–20)
CALCIUM SERPL-MCNC: 9.4 MG/DL (ref 8.3–10.6)
CHLORIDE BLD-SCNC: 96 MMOL/L (ref 99–110)
CO2: 21 MMOL/L (ref 21–32)
CREAT SERPL-MCNC: 7.1 MG/DL (ref 0.6–1.1)
GFR AFRICAN AMERICAN: 8
GFR NON-AFRICAN AMERICAN: 6
GLUCOSE BLD-MCNC: 130 MG/DL (ref 70–99)
GLUCOSE BLD-MCNC: 162 MG/DL (ref 70–99)
GLUCOSE BLD-MCNC: 167 MG/DL (ref 70–99)
GLUCOSE BLD-MCNC: 169 MG/DL (ref 70–99)
HCT VFR BLD CALC: 35 % (ref 36–48)
HEMOGLOBIN: 11.2 G/DL (ref 12–16)
MCH RBC QN AUTO: 26.1 PG (ref 26–34)
MCHC RBC AUTO-ENTMCNC: 31.9 G/DL (ref 31–36)
MCV RBC AUTO: 81.8 FL (ref 80–100)
PDW BLD-RTO: 17 % (ref 12.4–15.4)
PERFORMED ON: ABNORMAL
PHOSPHORUS: 5.6 MG/DL (ref 2.5–4.9)
PLATELET # BLD: 344 K/UL (ref 135–450)
PMV BLD AUTO: 8.2 FL (ref 5–10.5)
POTASSIUM SERPL-SCNC: 5.4 MMOL/L (ref 3.5–5.1)
RBC # BLD: 4.28 M/UL (ref 4–5.2)
SODIUM BLD-SCNC: 134 MMOL/L (ref 136–145)
WBC # BLD: 10.9 K/UL (ref 4–11)

## 2022-01-10 PROCEDURE — 94640 AIRWAY INHALATION TREATMENT: CPT

## 2022-01-10 PROCEDURE — 6370000000 HC RX 637 (ALT 250 FOR IP): Performed by: PHYSICAL MEDICINE & REHABILITATION

## 2022-01-10 PROCEDURE — 94760 N-INVAS EAR/PLS OXIMETRY 1: CPT

## 2022-01-10 PROCEDURE — 97129 THER IVNTJ 1ST 15 MIN: CPT

## 2022-01-10 PROCEDURE — 99253 IP/OBS CNSLTJ NEW/EST LOW 45: CPT | Performed by: SURGERY

## 2022-01-10 PROCEDURE — APPSS60 APP SPLIT SHARED TIME 46-60 MINUTES: Performed by: NURSE PRACTITIONER

## 2022-01-10 PROCEDURE — 97530 THERAPEUTIC ACTIVITIES: CPT

## 2022-01-10 PROCEDURE — 6360000002 HC RX W HCPCS: Performed by: PHYSICAL MEDICINE & REHABILITATION

## 2022-01-10 PROCEDURE — 97130 THER IVNTJ EA ADDL 15 MIN: CPT

## 2022-01-10 PROCEDURE — 85027 COMPLETE CBC AUTOMATED: CPT

## 2022-01-10 PROCEDURE — 97112 NEUROMUSCULAR REEDUCATION: CPT

## 2022-01-10 PROCEDURE — 1280000000 HC REHAB R&B

## 2022-01-10 PROCEDURE — 80069 RENAL FUNCTION PANEL: CPT

## 2022-01-10 PROCEDURE — APPNB30 APP NON BILLABLE TIME 0-30 MINS: Performed by: NURSE PRACTITIONER

## 2022-01-10 PROCEDURE — 97535 SELF CARE MNGMENT TRAINING: CPT

## 2022-01-10 PROCEDURE — 97116 GAIT TRAINING THERAPY: CPT

## 2022-01-10 PROCEDURE — 90935 HEMODIALYSIS ONE EVALUATION: CPT

## 2022-01-10 PROCEDURE — 36415 COLL VENOUS BLD VENIPUNCTURE: CPT

## 2022-01-10 PROCEDURE — 97110 THERAPEUTIC EXERCISES: CPT

## 2022-01-10 RX ORDER — FLUVOXAMINE MALEATE 50 MG/1
100 TABLET, COATED ORAL NIGHTLY
Status: DISCONTINUED | OUTPATIENT
Start: 2022-01-10 | End: 2022-01-21 | Stop reason: HOSPADM

## 2022-01-10 RX ADMIN — ASPIRIN 81 MG: 81 TABLET, COATED ORAL at 09:01

## 2022-01-10 RX ADMIN — FLUVOXAMINE MALEATE 100 MG: 50 TABLET, COATED ORAL at 21:17

## 2022-01-10 RX ADMIN — BENZONATATE 200 MG: 100 CAPSULE ORAL at 06:06

## 2022-01-10 RX ADMIN — AMLODIPINE BESYLATE 10 MG: 5 TABLET ORAL at 09:01

## 2022-01-10 RX ADMIN — INSULIN LISPRO 2 UNITS: 100 INJECTION, SOLUTION INTRAVENOUS; SUBCUTANEOUS at 12:48

## 2022-01-10 RX ADMIN — INSULIN GLARGINE 20 UNITS: 100 INJECTION, SOLUTION SUBCUTANEOUS at 09:05

## 2022-01-10 RX ADMIN — TIOTROPIUM BROMIDE AND OLODATEROL 2 PUFF: 3.124; 2.736 SPRAY, METERED RESPIRATORY (INHALATION) at 05:15

## 2022-01-10 RX ADMIN — ALPRAZOLAM 0.75 MG: 0.5 TABLET ORAL at 21:17

## 2022-01-10 RX ADMIN — HEPARIN SODIUM 5000 UNITS: 5000 INJECTION INTRAVENOUS; SUBCUTANEOUS at 12:47

## 2022-01-10 RX ADMIN — BUPROPION HYDROCHLORIDE 150 MG: 150 TABLET, EXTENDED RELEASE ORAL at 09:01

## 2022-01-10 RX ADMIN — HEPARIN SODIUM 5000 UNITS: 5000 INJECTION INTRAVENOUS; SUBCUTANEOUS at 06:08

## 2022-01-10 RX ADMIN — ATORVASTATIN CALCIUM 40 MG: 40 TABLET, FILM COATED ORAL at 21:18

## 2022-01-10 RX ADMIN — PREGABALIN 75 MG: 75 CAPSULE ORAL at 21:18

## 2022-01-10 RX ADMIN — LISINOPRIL 40 MG: 20 TABLET ORAL at 09:01

## 2022-01-10 RX ADMIN — HEPARIN SODIUM 5000 UNITS: 5000 INJECTION INTRAVENOUS; SUBCUTANEOUS at 21:18

## 2022-01-10 RX ADMIN — INSULIN LISPRO 1 UNITS: 100 INJECTION, SOLUTION INTRAVENOUS; SUBCUTANEOUS at 20:19

## 2022-01-10 RX ADMIN — SPIRONOLACTONE 50 MG: 25 TABLET ORAL at 09:01

## 2022-01-10 RX ADMIN — PROPRANOLOL HYDROCHLORIDE 80 MG: 80 CAPSULE, EXTENDED RELEASE ORAL at 09:05

## 2022-01-10 RX ADMIN — FUROSEMIDE 10 MG: 20 TABLET ORAL at 09:01

## 2022-01-10 RX ADMIN — ALPRAZOLAM 0.75 MG: 0.5 TABLET ORAL at 06:07

## 2022-01-10 ASSESSMENT — PAIN SCALES - GENERAL
PAINLEVEL_OUTOF10: 0
PAINLEVEL_OUTOF10: 0

## 2022-01-10 NOTE — FLOWSHEET NOTE
Treatment time: 3 hours  Net UF: 1500 ml     Pre weight: 80.5 kg   Post weight: 78.8 kg  EDW: 88 kg     Access used: RCW CVC  Access function: Good with  ml/min     Medications or blood products given: None     Regular outpatient schedule: [facility]     Summary of response to treatment: Tolerated tx well.  No HD related complaints or complications.      Copy of dialysis treatment record placed in chart, to be scanned into EMR.       01/10/22 1456 01/10/22 1810   Treatment   Time On  --  1503   Time Off  --  1806   Vital Signs   BP (!) 155/49 137/73   Temp 97.3 °F (36.3 °C) 98.1 °F (36.7 °C)   Pulse 81 85   Resp 18 18   Dialysis Bath   K+ (Potassium) 2  --    Ca+ (Calcium) 2.5  --    Na+ (Sodium) 137  --    HCO3 (Bicarb) 35  --

## 2022-01-10 NOTE — PATIENT CARE CONFERENCE
[facility]  Inpatient Rehabilitation  Weekly Team Conference Note    Patient Name: Daly Biggs        MRN: [id number]    : 1975  (55 y.o.)  Gender: female   Referring Practitioner: Dr. Clarita Valdez  Diagnosis: Recurrent Falls    The team conference for this patient was held on 22 at 11:00am and led by:  Alexus Spencer MD    CASE MANAGEMENT:  Assessment: Patient lives at home with spouse. Patient was independent with ADLs until recently. Patient is a dialysis patient and gets dialysis at [facility] [address] Patient has a walker. PHYSICAL THERAPY:    Bed Mobility:   Scooting: Contact guard assistance    Transfers:  Sit to Stand: Minimal Assistance (multiple completions within session including from EOB, w/c, seated stepper. VC for set up and FWD scooting)  Stand to sit: Minimal Assistance (decreased control, cues for safety)  Comment: When transfering to recliner pt reported cockroaches crawling in seat. She also acknowledged that she understands there were no actual cockroaches but it was just what she was seeing. Ambulation 1  Surface: level tile  Device: Rolling Walker  Assistance: Minimal assistance  Quality of Gait: narrow ROMAINE with mild (R) lean  Gait Deviations: Slow Kinjal,Decreased step length,Deviated path  Distance: 105'  Comments: Requires assistance for walker stabilization during ambulation    Stairs  # Steps : 3 (2 sets with seated rest in between)  Stairs Height: 6\"  Rails: Bilateral  Curbs: 6\"  Device: No Device  Assistance: Minimal assistance  Comment: step to pattern leading with (R) LE to ascend, (L) LE to descend; posterior and (R) lean noted.  Required VC and TC to correct    QM:  Roll Left and Right  Assistance Needed: Supervision or touching assistance  CARE Score: 4  Discharge Goal: Independent  Sit to Lying  Assistance Needed: Partial/moderate assistance  CARE Score: 3  Discharge Goal: Independent  Lying to Sitting on Side of Bed  Assistance Needed: Supervision or touching assistance  CARE Score: 4  Discharge Goal: Independent  Sit to Stand  Assistance Needed: Partial/moderate assistance  CARE Score: 3  Discharge Goal: Supervision or touching assistance  Chair/Bed-to-Chair Transfer  Assistance Needed: Partial/moderate assistance  CARE Score: 3  Discharge Goal: Supervision or touching assistance  Car Transfer  Assistance Needed: Partial/moderate assistance  Reason if not Attempted: Not attempted due to medical condition or safety concerns  CARE Score: 3  Discharge Goal: Supervision or touching assistance  Walk 10 Feet  Assistance Needed: Partial/moderate assistance  CARE Score: 3  Discharge Goal: Supervision or touching assistance  Walk 50 Feet with Two Turns  Assistance Needed: Partial/moderate assistance  Reason if not Attempted: Not attempted due to medical condition or safety concerns  CARE Score: 3  Discharge Goal: Supervision or touching assistance  Walk 150 Feet  Reason if not Attempted: Not attempted due to medical condition or safety concerns  CARE Score: 88  Discharge Goal: Supervision or touching assistance  Walking 10 Feet on Uneven Surfaces  Reason if not Attempted: Not attempted due to medical condition or safety concerns  CARE Score: 88  Discharge Goal: Supervision or touching assistance  1 Step (Curb)  Reason if not Attempted: Not attempted due to medical condition or safety concerns  CARE Score: 88  Discharge Goal: Supervision or touching assistance  4 Steps  Assistance Needed: Partial/moderate assistance  Physical Assistance Level: 25%-49%  Reason if not Attempted: Not attempted due to medical condition or safety concerns  CARE Score: 3  Discharge Goal: Supervision or touching assistance  12 Steps  Reason if not Attempted: Not attempted due to medical condition or safety concerns  CARE Score: 88  Discharge Goal: Supervision or touching assistance  Picking Up Object  Reason if not Attempted: Not attempted due to medical condition or safety concerns  CARE Score: 88  Discharge Goal: Supervision or touching assistance  Wheelchair Ability  Uses a Wheelchair and/or Scooter?: No    SPEECH THERAPY:    Diet Level:ADULT DIET; Regular; 4 carb choices (60 gm/meal)    Assessment: Speech Therapy Progress:    Pt continues to present with mild cognitive linguistic deficits. High accuracy with naming tasks but continues to complain of effortful word production. Higher level cognitive-linguistic tasks are limited due to visual deficits. Speech Diagnosis: Pt continues to present with slow speech production and hoarse vocal quality at baseline. Will continue to assess pt's speech sound production and treat as appropriate. OCCUPATIONAL THERAPY:    ADL:   ADL  Feeding: Setup  Grooming: Setup,Stand by assistance,Verbal cueing,Increased time to complete (seated face washing w/c level at sink)  UE Bathing: Setup,Verbal cueing,Stand by assistance,Increased time to complete  LE Bathing: Moderate assistance  UE Dressing: Minimal assistance  LE Dressing: Setup,Stand by assistance (to change socks only)  Toileting: Dependent/Total  Additional Comments: Pt ambulated to bathroom with RW for ADLs- pt assisted to wash hair in w/c level- pt then sat at sink for UB sponge bathing and dressing (pt declined LB bathing), increased time and cues required for initiation- socks changed seated w/c level post UB dressing- all other ADL needs declined. Toilet Transfers:   Toilet Transfers  Toilet - Technique: Ambulating  Equipment Used: Extra wide bedside commode  Toilet Transfer: Minimal assistance    QM:  Eating  Assistance Needed: Setup or clean-up assistance  CARE Score: 5  Discharge Goal: Independent  Oral Hygiene  Assistance Needed: Supervision or touching assistance  Reason if not Attempted: Patient refused  CARE Score: 7  Discharge Goal: Supervision or touching assistance  Toileting Hygiene  Assistance Needed: Substantial/maximal assistance  CARE Score: 2  Discharge Goal: Supervision or touching assistance  Toilet Transfer  Assistance Needed: Partial/moderate assistance  CARE Score: 3  Discharge Goal: Supervision or touching assistance  Shower/Bathe Self  Assistance Needed: Substantial/maximal assistance  CARE Score: 2  Discharge Goal: Supervision or touching assistance  Upper Body Dressing  Assistance Needed: Supervision or touching assistance  CARE Score: 4  Discharge Goal: Supervision or touching assistance  Lower Body Dressing  Assistance Needed: Substantial/maximal assistance  CARE Score: 2  Discharge Goal: Supervision or touching assistance  Putting On/Taking Off Footwear  Assistance Needed: Supervision or touching assistance  CARE Score: 4  Discharge Goal: Supervision or touching assistance    NUTRITION:  Weight: 173 lb 11.6 oz (78.8 kg) / Body mass index is 27.21 kg/m². Diet Order:CCC    Supplements:NA    Please see nutrition note for details. NURSING:    Risk for Readmission: 33%    Barrett Fall Risk Score: 45  Wounds/Incisions/Ulcers: none  Medication Review: daily with physician/RN/Pharmacist  Pain: denies  Consultations/Labs/X-rays: n/a    Patient/Family Education provided by team:    Discharge Plan   Estimated Length of Stay:10 days  Destination: home health  Pass:No  Services at Discharge: [facility] PT/OT/SLP  Equipment at Discharge: pt owns shower chair, needs RW  Factors facilitating achievement of predicted outcomes: family support   Barriers to the achievement of predicted outcomes: weakness, confusion  Patient Goals: To reduce falling episodes, to get stronger, stop falling    Plan of Care  Interdisciplinary Individualized Plan of Care Review:    Continue Current Plan of Care:  Yes  Modifications:_____________________________    Rehab Team Members in attendance for Team Conference:  Cindy Garduno, MSW, LSW    Yuri Mcneal, RD, LD    Dane Bellamy, OTR/L    Alex Gee, PT, DPT    Liliana Peterson M.A., CCC-SLP    Max Wang RN    Jazmine Morales PT, DPT,     I approve the established interdisciplinary plan of care as documented within the medical record of Brett Wheat.     Idris Carrasco MD  Electronically signed by Idris Carrasco MD on 1/11/2022 at 11:20 AM

## 2022-01-10 NOTE — PROGRESS NOTES
Occupational Therapy  Facility/Department: [facility] ACUTE REHAB UNIT  Daily Treatment Note  NAME: Pallavi Beach  : 1975  MRN: [id number]    Date of Service: 1/10/2022    Discharge Recommendations:  Home with Home health OT,24 hour supervision or assist  OT Equipment Recommendations  Equipment Needed: No  Other: owns grab bars, shower chair    Assessment   Performance deficits / Impairments: Decreased functional mobility ; Decreased ADL status; Decreased endurance;Decreased balance;Decreased vision/visual deficit; Decreased safe awareness  Assessment: Pt is well below her baseline level of occupation function, based on the above deficits associated with ESRD. Pt has a chronic visual deficit. Pt would benefit from continued skilled acute OT services to address these deficits. Treatment Diagnosis: Decreased ADL status, functional mobility, endurance, balance, and safety awareness associated with ESRD  Prognosis: Good  OT Education: OT Role;Plan of Care;ADL Adaptive Strategies;Transfer Training;Energy Conservation;IADL Safety  Patient Education: continue to reinforce for carryover  Barriers to Learning: Visual  REQUIRES OT FOLLOW UP: Yes  Activity Tolerance  Activity Tolerance: Patient Tolerated treatment well;Patient limited by fatigue  Activity Tolerance: closing eyes intermittently throughout, requires increased time to complete tasks and benefits from rest breaks after standing  Safety Devices  Safety Devices in place: Yes  Type of devices: Left in chair; All fall risk precautions in place;Call light within reach; Chair alarm in place         Patient Diagnosis(es): Debility     has a past medical history of Blind in both eyes, Cerebral artery occlusion with cerebral infarction [facility]), CHF (congestive heart failure) ([facility] [address] Chronic kidney disease, Depression, Diabetes mellitus out of control ([facility] [address] Diabetes mellitus, type II ([facility] [address] Diabetic neuropathy associated with type 2 diabetes mellitus ([facility] [address] Generalized headaches, Hypertension, Infertility, Insomnia, Migraine headache, Mixed hyperlipidemia, Otitis media, Pelvic abscess in female, Pneumonia, Stroke [facility]), and Stroke ([facility] [address] has a past surgical history that includes Cervix surgery; eye surgery; Foot surgery (Right); Foot surgery (Bilateral); Foot surgery (Left); and IR TUNNELED CVC PLACE WO SQ PORT/PUMP > 5 YEARS (9/7/2021). Restrictions  Restrictions/Precautions  Restrictions/Precautions: Fall Risk  Required Braces or Orthoses?: No  Position Activity Restriction  Other position/activity restrictions: 51-year-old female with a history of ESRD on HD, diabetes, diabetic neuropathy, legal blindness, CVA with resultant right hemiparesis, HTN, and HLD who was admitted on 1/4 with recurrent falls. Due to increasing difficulty with ambulation and transfers, she had missed 2 dialysis sessions prior to admission. She was recently discharged on 12/29 for an admission with generalized weakness and falls consistent with this admission. She scored well enough with therapy evaluations to discharge to home however it does not appear she is able to care for herself with the assistance of her . She was evaluated by therapy and suggested to continue in an inpatient setting prior to returning home. Patient reports that she had her initial stroke in August and discharged to home with outpatient therapies and subsequently her second stroke in late 2021 resulted in her discharging to [address]. She felt that her therapies were not as effective or intensive as her outpatient therapies. She reports no current complaints.     Subjective   General  Chart Reviewed: Yes  Patient assessed for rehabilitation services?: Yes  Family / Caregiver Present: No  Diagnosis: ESRD  Subjective  Subjective: Pt in recliner at entry, agreeable to OT session, denied pain  Vital Signs  Patient Currently in Pain: Denies          Objective    ADL  Grooming: Setup;Stand by assistance;Verbal cueing; Increased time to complete (seated face washing w/c level at sink)  UE Bathing: Setup;Verbal cueing;Stand by assistance; Increased time to complete  LE Dressing: Setup;Stand by assistance (to change socks only)  Additional Comments: Pt ambulated to bathroom with RW for ADLs- pt assisted to wash hair in w/c level- pt then sat at sink for UB sponge bathing and dressing (pt declined LB bathing), increased time and cues required for initiation- socks changed seated w/c level post UB dressing- all other ADL needs declined. Instrumental ADL's  Instrumental ADLs: Yes  Light Housekeeping  Light Housekeeping Level: Other  Light Housekeeping Level of Assistance: Minimal assistance  Light Housekeeping: Pt placed and removed 8 grocery item from upper/lower cabinets to address IADL role, balance, and activity tolerance: CGA progressing to Katrin d/t R lateral lean with fatigue- pt utilized coutner top for UE support throughout, Min VCs for proximity and hand placement on counter for sfety during low reaching- 2 seated rest breaks required          Balance  Sitting Balance: Stand by assistance  Standing Balance:  (CGA to Katrin >> ModA for dynamic with fatigue)  Standing Balance  Time: 6-8min total  Activity: functional mobility, transfers, dynamic reaching    Functional Mobility  Functional - Mobility Device: Rolling Walker  Assist Level: Contact guard assistance  Functional Mobility Comments: recliner>bathroom    Bed mobility  Sit to Supine: Contact guard assistance  Comment: HOB flat, use of HR     Transfers  Sit to stand: Contact guard assistance;Minimal assistance  Stand to sit: Minimal assistance;Contact guard assistance  Transfer Comments: intermittent cues for hand placement                          Cognition  Overall Cognitive Status: Exceptions  Arousal/Alertness: Delayed responses to stimuli  Following Commands: Follows one step commands consistently; Follows one step commands with increased time  Attention Span: Attends with cues to redirect  Memory: Appears intact  Safety Judgement: Decreased awareness of need for assistance  Problem Solving: Assistance required to generate solutions;Assistance required to implement solutions  Insights: Decreased awareness of deficits  Initiation: Requires cues for some  Sequencing: Requires cues for some                       Type of ROM/Therapeutic Exercise  Type of ROM/Therapeutic Exercise: AROM; Resistive Bands  Comment: EOB UB TE for strength/balance: medium resistance band- 8-10reps x1 of rows, diagonal arm raises and horizontal abduction                    Plan   Plan  Times per week: 75 minutes 5 days per week  Times per day: Daily  Current Treatment Recommendations: Safety Education & Training,Self-Care / [facility] Mobility Training,Balance Training          Goals  Short term goals  Time Frame for Short term goals: 2 weeks  Short term goal 1: SBA functional transfers to toilet and shower  Short term goal 2: SBA UB ADLs  Short term goal 3: min A LB ADLs  Short term goal 4: CGA functional mobility for ADLs  Patient Goals   Patient goals : to get stronger, stop falling       Therapy Time   Individual Concurrent Group Co-treatment   Time In 1330         Time Out 1445         Minutes 75         Timed Code Treatment Minutes: 75 Minutes  Total Treatment Minutes:  [address], [address] [facility] 9333

## 2022-01-10 NOTE — PLAN OF CARE
Problem: Falls - Risk of:  Goal: Will remain free from falls  Description: Will remain free from falls  1/9/2022 2232 by Jacky Allen RN  Outcome: Ongoing  1/9/2022 1131 by Miguel A Fierro RN  Outcome: Ongoing  Note: Education Provided as follows:  Family/Patient made aware of the tailored interventions falls plan using the TIPS TOOL.    Goal: Absence of physical injury  Description: Absence of physical injury  Outcome: Ongoing

## 2022-01-10 NOTE — PROGRESS NOTES
MD Jemima Krueger MD Kelvin Belfast, MD                Office: [phone]                      Fax: [phone]          Inpatient  Progress Note:     PATIENT NAME: Gorge Bain  : 1975  MRN: [id number]         IMPRESSION / RECOMMENDATION:       Admitted for:  Recurrent falls [R29.6]        1. ESRD on iHD: MWF , follows w/ Dr. Gricelda Abdalla  - outpt HD center: [facility], Dr Gricelda Abdalla   - recently started HD in 2021, during admission w/ ATN w/ KINZA on CKD-4, was lost for f/u,), had acute hypoxic respiratory failure, pneumonia - needing intubation in past   - missed HD x2  - encouraged compliance as if her solutes are better control, she may feel overall better    - so needed HD on admission during inpatient stay  - now admitted to ARU for inpatient rehab    HD today - monday     Labs MWF     EDW listed 88 (but per pt 85 kg)  81 kg pre-HD So minimal UF needed       2. Hypertension/Volume Status:  - BP HTN - hx of resistant HTN - slightly uncontrolled - f/u after HD + resume home meds   - EDW reported 88  -> 83 kg currently - so might be her new DW  - Na - chronic hypoNatremia - f/u w/ HD       3. Electrolytes/acid-base:  K: WNL  High AG metabolic acidosis: mild - f/u w/ HD      4. Anemia of renal failure: at goal  - RIA: w/ HD     5. BMD:  - Phos: follow iPTH outpt     D/W Pt, Dr Cassius Coon        : Other supportive care :   - Check daily renal function panel with electrolytes-phosphorus  - Strict monitoring of I/Os, daily weight  - Renal feeds/diet  - Current medications reviewed. - Nephrotoxic medications have been discontinued. - Dose adjusted and appropriate.                             - Dose meds for eGFR <15 mL/min/1.73m2.               - Avoid heavy opioids due to renal failure - may use very low dose dilaudid / fentanyl with close monitoring of CNS and respiratory depression.             Other Problems:  Recurrent falls [R29.6]       Please refer to orders. Multiple complex problems. High risk  Discussed with patient, and treatment team    Thank you for allowing me to participate in this patient's care. Please do not hesitate to contact me with any questions/concerns. We will follow along with you.           Quinn Adams MD  [facility] [address] [place]: [phone] or [facility] [employer]  Fax: [phone]     Time spent  ~ 35 minutes that included face-to-face meeting/discussion with patient, and treatment team (including primary/referring team and other consultants; included coordination of care with the treatment team; and review of patient's electronic medical records and ordering appropriates tests.          Subjective:  Seen resting in bed  No complaints-distress  BP was slightly higher only - better    Vitals:    01/10/22 0515   BP:    Pulse:    Resp: 16   Temp:    SpO2: 93%       Awake, co-operative    Neck: Supple. no JVD. Cardiac: S1 and S2+, No pericardial rub. Chest: Normal respiratory effort,  mild Rales.  No rhonchi  Ext :  LE Edema +, no cynosis         Labs Reviewed  by me   Labs   Lab Results   Component Value Date    CREATININE 7.1 (HH) 01/10/2022    BUN 61 (H) 01/10/2022     (L) 01/10/2022    K 5.4 (H) 01/10/2022    CL 96 (L) 01/10/2022    CO2 21 01/10/2022     Lab Results   Component Value Date    WBC 10.9 01/10/2022    HGB 11.2 (L) 01/10/2022    HCT 35.0 (L) 01/10/2022    MCV 81.8 01/10/2022     01/10/2022       Dialysis Treatment and Prescription reviewed

## 2022-01-10 NOTE — CONSULTS
[facility]  Consultation Note    Chief Complaint / Reason for Consultation  Dialysis access     History of Present Illness  Patient is a 55 y.o. female with past medical history of HTN, HLD, DM, CHF, CVA and ESRD on HD who was admitted to ARU on 1/7/22 for frequent falls at home and generalized weakness. Patient reports she was started on HD in September currently getting dialysis through a right IJ tunneled dialysis catheter on MWF. She reports a history of 2 strokes in the past with the first one affecting her right side but reports minimal residual weakness. She is left handed. She had been at home and had been falling about 60 times in the last 2 months. She denies tobacco use. She had apt to see Dr. Daly Purvis tomorrow to discuss AV access creation. She has had no vein mapping prior. Review of Systems  + generalized weakness. Denies fevers, chills, chest pain, shortness of breath, nausea, vomiting, hematemesis, diarrhea, constipation, melena, hematochezia, wt changes, hearing problems, facial droop, slurred speech,, extremity numbness, dysuria. Past Medical History:   Diagnosis Date    Blind in both eyes     Cerebral artery occlusion with cerebral infarction ([address]     CHF (congestive heart failure) (HCC)     Chronic kidney disease     Depression     Diabetes mellitus out of control ([address]     Diabetes mellitus, type II ([address]     2005    Diabetic neuropathy associated with type 2 diabetes mellitus ([address]     Generalized headaches     Hypertension     Infertility     Insomnia     chronic vs lack of time spent to sleep    Migraine headache 11/09/2011    Mixed hyperlipidemia     Otitis media     h/o recurrent    Pelvic abscess in female 10/05/2013    Pneumonia     2004 approx.     Stroke ([address] 08/27/2020    Stroke ([address] 08/27/2021       Past Surgical History:   Procedure Laterality Date    CERVIX SURGERY      laser tx for dysplasia;1992    EYE SURGERY      FOOT SURGERY Right     FOOT SURGERY Bilateral     FOOT SURGERY Left     IR TUNNELED CATHETER PLACEMENT GREATER THAN 5 YEARS  9/7/2021    IR TUNNELED CATHETER PLACEMENT GREATER THAN 5 YEARS 9/7/2021 Matilde Lua MD [facility] SPECIAL PROCEDURES       Allergies   Allergen Reactions    Amoxicillin Hives, Itching and Other (See Comments)     Tolerates cephalosporins  Patient tolerating cefazolin (ANCEF) as of October 11, 2018      Levofloxacin Anaphylaxis    Vancomycin Anaphylaxis, Hives and Shortness Of Breath    Tape [Adhesive Tape] Other (See Comments)     Paper tape turns skin bright red. Plastic tape okay. Social History     Socioeconomic History    Marital status:      Spouse name: James Lama Number of children: 0    Years of education: Not on file    Highest education level: Not on file   Occupational History    Occupation: works as    Tobacco Use    Smoking status: Former Smoker     Packs/day: 1.00     Types: Cigarettes    Smokeless tobacco: Never Used    Tobacco comment: Quit in August 2021   Vaping Use    Vaping Use: Never used   Substance and Sexual Activity    Alcohol use: No     Comment: Very Rare    Drug use: No    Sexual activity: Yes     Partners: Male   Other Topics Concern    Not on file   Social History Narrative    Not on file     Social Determinants of Health     Financial Resource Strain:     Difficulty of Paying Living Expenses: Not on file   Food Insecurity:     Worried About Running Out of Food in the Last Year: Not on file    Chris of Food in the Last Year: Not on file   Transportation Needs:     Lack of Transportation (Medical): Not on file    Lack of Transportation (Non-Medical):  Not on file   Physical Activity:     Days of Exercise per Week: Not on file    Minutes of Exercise per Session: Not on file   Stress:     Feeling of Stress : Not on file   Social Connections:     Frequency of Communication with Friends and Family: Not on file  Frequency of Social Gatherings with Friends and Family: Not on file    Attends Denominational Services: Not on file    Active Member of Clubs or Organizations: Not on file    Attends Club or Organization Meetings: Not on file    Marital Status: Not on file   Intimate Partner Violence:     Fear of Current or Ex-Partner: Not on file    Emotionally Abused: Not on file    Physically Abused: Not on file    Sexually Abused: Not on file   Housing Stability:     Unable to Pay for Housing in the Last Year: Not on file    Number of Places Lived in the Last Year: Not on file    Unstable Housing in the Last Year: Not on file       Family History   Problem Relation Age of Onset    Diabetes Mother    Dayna Aguilar Other Mother 79        Covid    Diabetes Father     High Blood Pressure Father     Colon Cancer Father     Diabetes Sister     Alcohol Abuse Maternal Grandfather     Diabetes Paternal Grandmother     Alcohol Abuse Paternal Grandfather     Diabetes Paternal Aunt     Diabetes Paternal Uncle      - No history of bleeding or clotting disorders    Vital Signs  Vitals:    01/09/22 1945 01/09/22 2246 01/10/22 0515 01/10/22 0600   BP: 126/75      Pulse: 85      Resp: 16  16    Temp: 97.5 °F (36.4 °C)      TempSrc: Axillary      SpO2: 97%  93%    Weight:  190 lb 14.7 oz (86.6 kg)  181 lb 7 oz (82.3 kg)   Height:           Physical Examination  General: no apparent distress, appears stated age  Psychiatric: affect appropriate  Head/Eyes/Ears/Nose/Throat:  Normocephalic, atraumatic, PERRLA, face symmetric  Neck:  no adenopathy, no carotid bruit, no JVD, supple, symmetrical, trachea midline, thyroid not enlarged, no tenderness/mass/nodules  Chest/Lungs: clear to auscultation bilaterally, no accessory muscle use; right IJ TDC (+)  Cardiac:  regular rate and rhythm, S1, S2 normal, no murmur, click, rub or gallop  Abdomen: soft, nontender, active bowel sounds  Extremities: warm and well perfused, no signs of cyanosis or ischemia, no significant edema, bilateral upper and lower extremity motorsensory intact  Vascular exam:  - R radial: 1+ with good doppler signal  - L radial: 2+  - R brachial: 2+  - L brachial: 2+     Labs  Lab Results   Component Value Date    WBC 10.9 01/10/2022    HGB 11.2 01/10/2022    HCT 35.0 01/10/2022    MCV 81.8 01/10/2022     01/10/2022     Lab Results   Component Value Date     01/06/2022    K 4.3 01/06/2022    K 4.5 01/05/2022    CL 95 01/06/2022    CO2 22 01/06/2022    PHOS 4.1 10/18/2021    BUN 26 01/06/2022    CREATININE 5.5 01/06/2022      No results found for: GLU    Scheduled Meds:    fluvoxaMINE  100 mg Oral Nightly    heparin (porcine)  5,000 Units SubCUTAneous 3 times per day    insulin lispro  0-12 Units SubCUTAneous TID WC    insulin lispro  0-6 Units SubCUTAneous Nightly    amLODIPine  10 mg Oral Daily    aspirin  81 mg Oral Daily    atorvastatin  40 mg Oral Nightly    buPROPion  150 mg Oral QAM    [START ON 1/12/2022] cloNIDine  1 patch TransDERmal Weekly    furosemide  10 mg Oral Daily    insulin glargine  20 Units SubCUTAneous QAM    lisinopril  40 mg Oral Daily    pregabalin  75 mg Oral Nightly    propranolol  80 mg Oral Daily    spironolactone  50 mg Oral Daily    tiotropium-olodaterol  2 puff Inhalation Daily     Continuous Infusions:    dextrose           Assessment:   ESRD on HD MWF - left handed, currently getting dialysis through right IJ tunneled dialysis catheter  Debility, recurrent falls   DM  H/o CVA - no significant right sided weakness  HTN  HLD      Plan: Will get vein mapping of right upper extremity to further evaluate for AV fistula creation. Avoid blood draws or IVs in right arm. Will follow up after vein mapping complete with further recommendation and plan. Plan discussed with Dr. Joan Escalante. Thank you for the consultation. Patient educated on plan of care and disease process. All questions answered.         Electronically signed by RIVERA Fofana CNP on 1/10/2022 at 10:41 AM     Vascular Staff    I independently performed an evaluation on Randy Durham. I have reviewed the above documentation completed by Javier Renee CNP. Please see my additional contributions to the HPI, physical exam, assessment, and medical decision making. 54 yo female with ESRD on HD with IJ dialysis cath. Pt is left hand dominant. We are asked to see for new access creation. Medically history reviewed  Pt denies any previous access  PE:  AF  Right brachial pulse 1-2+, right radial 0-1+  No visible veins on the right    I:  ESRD on HD with IJ dialysis cath  H/o CVA  HTN  HLD  P:  Will obtain vein map imaging of the right arm and will follow up with pt after that to discuss possibility of fistula or AV graft. Pt updated on plan. All questions answered  Will follow  Sincerely appreciate the consultation. [place] Arvell Harada M.D., FACS.   1/10/2022  2:49 PM

## 2022-01-10 NOTE — PROGRESS NOTES
Quique Quinteros  1/10/2022  [id number]    Chief Complaint: Recurrent falls    Subjective:   Hallucinating over the weekend. Reports knowing that the hallucinations are not real. Had issues with this in the past. She reports that Luvox resolved them previously and is requesting to resume the medication. No other complaints. Labs pending this am.    ROS: No CP, SOB, dyspnea    Objective:  Patient Vitals for the past 24 hrs:   BP Temp Temp src Pulse Resp SpO2 Weight   01/10/22 0600 -- -- -- -- -- -- 181 lb 7 oz (82.3 kg)   01/10/22 0515 -- -- -- -- 16 93 % --   01/09/22 2246 -- -- -- -- -- -- 190 lb 14.7 oz (86.6 kg)   01/09/22 1945 126/75 97.5 °F (36.4 °C) Axillary 85 16 97 % --   01/09/22 1244 -- -- -- -- 16 94 % --     Gen: No distress, pleasant. Resting in WC. Appears much more energetic and interactive than prior encounters. HEENT: Normocephalic, atraumatic. CV: Regular rate and rhythm. No MRG   Resp: No respiratory distress. CTAB   Abd: Soft, nontender nondistended  Ext: No edema. Neuro: Alert, oriented, appropriately interactive. Laboratory data: Available via EMR. Therapy progress:  PT  Position Activity Restriction  Other position/activity restrictions: 51-year-old female with a history of ESRD on HD, diabetes, diabetic neuropathy, legal blindness, CVA with resultant right hemiparesis, HTN, and HLD who was admitted on 1/4 with recurrent falls. Due to increasing difficulty with ambulation and transfers, she had missed 2 dialysis sessions prior to admission. She was recently discharged on 12/29 for an admission with generalized weakness and falls consistent with this admission. She scored well enough with therapy evaluations to discharge to home however it does not appear she is able to care for herself with the assistance of her . She was evaluated by therapy and suggested to continue in an inpatient setting prior to returning home.   Patient reports that she had her initial stroke in August and discharged to home with outpatient therapies and subsequently her second stroke in late 2021 resulted in her discharging to [address]. She felt that her therapies were not as effective or intensive as her outpatient therapies. She reports no current complaints. PT Equipment Recommendations  Equipment Needed: Yes  Mobility Devices: Wen Adhikari: Rolling  Objective     Bridging: Minimal assistance  Scooting: Contact guard assistance  Sit to Stand: Minimal Assistance (VC for set up and FWD scooting)  Stand to sit: Minimal Assistance (decreased control, cues for safety)  Ambulation  Device: Rolling Walker  Assistance: Minimal assistance (min A with 1 occurance of max A needed to correct R lean and mod A for 1 occurance, both when pt fatigued.)  Distance: 105'  Assessment   Assessment: Pt demonstrated poor trunk control and balance while completing activities seated edge of mat on a firm surface, and demonstrated increasing R lean during ambulation. Pt was able to tolerate increased activity today but demonstrated decreased performance with fatigue. Pt is below functional baseline and would benefit from continued skilled PT to maximize safe functional independence. OT  Objective  Feeding: Setup  UE Bathing: Minimal assistance  LE Bathing: Moderate assistance  UE Dressing: Minimal assistance  LE Dressing: Maximum assistance  Toileting: Dependent/Total  Toilet - Technique: Ambulating  Equipment Used: Extra wide bedside commode  Toilet Transfer: Minimal assistance     Sit to stand: Minimal assistance (x4; from recliner x1 (min A), from w/c x2 (min > CGA), from EOB (CGA))  Stand to sit: Minimal assistance (x4; to w/c x2, EOB, recliner)  Toilet - Technique: Ambulating  Equipment Used: Extra wide bedside commode  Assessment   Assessment: Pt is well below her baseline level of occupation function, based on the above deficits associated with ESRD. Pt has a chronic visual deficit.  Pt would benefit from continued skilled acute OT services to address these deficits. SLP  Cognitive Diagnosis: Pt presents with mild cognitive linguistic deficits in the domains of thought organization, working memory and attention. Pt easily stimulable to correct errors given extended time or repetitions. Some errors were evident due to visual deficits. Pt appropriately aware of her fall risks and effects of stroke. Speech Diagnosis: Pt with slow speech production but intelligible for structured conversation. Pt reported she uses slow speech to compensate and sound more clear, stated she used to talk fast. Mild to moderately hoarse vocal quality was attributed to vocal fold paralysis. Pt declined any concerns with her current speech sound production. Body mass index is 28.42 kg/m².     Assessment:  Patient Active Problem List   Diagnosis    Type 2 diabetes mellitus, with long-term current use of insulin ([facility])    Mixed hyperlipidemia    Migraine headache    Anemia    Diabetic foot infection ([address]    Pyogenic inflammation of bone ([address]    History of medication noncompliance    Osteomyelitis of left foot ([facility])    Nephrotic syndrome    Peripheral edema    Pulmonary edema    Right sided numbness    Tobacco dependence    Chronic kidney disease (CKD), stage III (moderate) ([facility])    Chronic diastolic (congestive) heart failure ([facility])    History of CVA (cerebrovascular accident)    Arterial ischemic stroke, ICA, left, acute ([address]    HTN (hypertension), benign    DM (diabetes mellitus), secondary, uncontrolled, w/neurologic complic ([address]    Dyslipidemia    Smoker    Panic disorder    Isolated proteinuria    Acute on chronic diastolic heart failure (HCC)    Diabetic peripheral neuropathy (HCC)    Acute respiratory failure (HCC)    Depression    Abnormal gait    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, associated with type 2 diabetes mellitus ([facility]    Cellulitis of right foot    Hidradenitis suppurativa    Hypocalcemia    Non-toxic multinodular goiter    Polyneuropathy due to type 2 diabetes mellitus (HCC)    Proliferative diabetic retinopathy associated with type 2 diabetes mellitus ([facility]    ESRD (end stage renal disease) ([facility]    Acute respiratory failure with hypoxemia (HCC)    Acute respiratory failure due to COVID-19 ([facility]    Suspected COVID-19 virus infection    Epiglottitis    Recurrent falls       Plan:   Debility: PT/OT     Recurrent falls: PT/OT     ESRD on HD: MWF, Nephro following     DM: lantus 20, SSI     DM neuropathy: lyrica 75     H/O CVA: resultant R hemiparesis per report but none significant on exam     HTN: norvasc 10, clonidine 0.2, lisinopril 40, spironolactone 50     HLD: Lipitor 40     Anx/Dep: wellbutrin , xanax PRN, - resume home luvox 100 HS     Bowels: Per protocol  Bladder: Per protocol   Sleep: Trazodone provided prn. Pain: tylenol, tramadol PRN   DVT PPx: Heparin    GILSON: CHIDI Rodríguez MD 1/10/2022, 9:34 AM    * This document was created using dictation software. While all precautions were taken to ensure accuracy, errors may have occurred. Please disregard any typographical errors.

## 2022-01-10 NOTE — PROGRESS NOTES
linguistic deficits in the domains of thought organization, working memory and attention. Pt easily stimulable to correct errors given extended time or repetitions. Some errors were evident due to visual deficits. Pt appropriately aware of her fall risks and effects of stroke. Speech Diagnosis: Pt with slow speech production but intelligible for structured conversation. Pt reported she uses slow speech to compensate and sound more clear, stated she used to talk fast. Mild to moderately hoarse vocal quality was attributed to vocal fold paralysis. Pt declined any concerns with her current speech sound production. Current Diet Order: ADULT DIET; Regular; 4 carb choices (60 gm/meal)            Plan:     Frequency:  5days/week   30 minutes/day    Discharge Recommendations:   Barriers: Visual deficits (legally blind)   Discharge Recommendations:  [] Home independently  [x] Home with assistance [x]  24 hour supervision  [] SNF [] Other:  Continued SLP Treatment:  [x] Yes [] No [] TBD based on progress while on ARU [] Vital Stim indicated [] Other:   Estimated discharge date: 1/21/2022     Session 1 Times: Individual Concurrent Group Co-treatment   Time In  0900         Time Out  0930         Minutes  30         Time Code Minutes  30        Timed Code Treatment Minutes:  30    Total Treatment Minutes:  30    Electronically Signed by     Kathy Loya M.A.  CCC-SLP MARJAN [id number]  Speech-Language Pathologist   1/10/2022 2:46 PM

## 2022-01-11 LAB
GLUCOSE BLD-MCNC: 112 MG/DL (ref 70–99)
GLUCOSE BLD-MCNC: 126 MG/DL (ref 70–99)
GLUCOSE BLD-MCNC: 201 MG/DL (ref 70–99)
GLUCOSE BLD-MCNC: 217 MG/DL (ref 70–99)
PERFORMED ON: ABNORMAL

## 2022-01-11 PROCEDURE — 92507 TX SP LANG VOICE COMM INDIV: CPT

## 2022-01-11 PROCEDURE — 97530 THERAPEUTIC ACTIVITIES: CPT

## 2022-01-11 PROCEDURE — 97129 THER IVNTJ 1ST 15 MIN: CPT

## 2022-01-11 PROCEDURE — 97110 THERAPEUTIC EXERCISES: CPT

## 2022-01-11 PROCEDURE — 97535 SELF CARE MNGMENT TRAINING: CPT

## 2022-01-11 PROCEDURE — 94761 N-INVAS EAR/PLS OXIMETRY MLT: CPT

## 2022-01-11 PROCEDURE — 6360000002 HC RX W HCPCS: Performed by: PHYSICAL MEDICINE & REHABILITATION

## 2022-01-11 PROCEDURE — 94640 AIRWAY INHALATION TREATMENT: CPT

## 2022-01-11 PROCEDURE — 6370000000 HC RX 637 (ALT 250 FOR IP): Performed by: PHYSICAL MEDICINE & REHABILITATION

## 2022-01-11 PROCEDURE — 93986 DUP-SCAN HEMO COMPL UNI STD: CPT

## 2022-01-11 PROCEDURE — 1280000000 HC REHAB R&B

## 2022-01-11 PROCEDURE — 97112 NEUROMUSCULAR REEDUCATION: CPT

## 2022-01-11 PROCEDURE — 97116 GAIT TRAINING THERAPY: CPT

## 2022-01-11 RX ADMIN — ATORVASTATIN CALCIUM 40 MG: 40 TABLET, FILM COATED ORAL at 21:33

## 2022-01-11 RX ADMIN — FLUVOXAMINE MALEATE 100 MG: 50 TABLET, COATED ORAL at 21:35

## 2022-01-11 RX ADMIN — AMLODIPINE BESYLATE 10 MG: 5 TABLET ORAL at 09:00

## 2022-01-11 RX ADMIN — HEPARIN SODIUM 5000 UNITS: 5000 INJECTION INTRAVENOUS; SUBCUTANEOUS at 21:47

## 2022-01-11 RX ADMIN — ALPRAZOLAM 0.75 MG: 0.5 TABLET ORAL at 09:09

## 2022-01-11 RX ADMIN — FUROSEMIDE 10 MG: 20 TABLET ORAL at 09:00

## 2022-01-11 RX ADMIN — HEPARIN SODIUM 5000 UNITS: 5000 INJECTION INTRAVENOUS; SUBCUTANEOUS at 06:21

## 2022-01-11 RX ADMIN — HEPARIN SODIUM 5000 UNITS: 5000 INJECTION INTRAVENOUS; SUBCUTANEOUS at 18:42

## 2022-01-11 RX ADMIN — PREGABALIN 75 MG: 75 CAPSULE ORAL at 21:33

## 2022-01-11 RX ADMIN — SPIRONOLACTONE 50 MG: 25 TABLET ORAL at 09:00

## 2022-01-11 RX ADMIN — LISINOPRIL 40 MG: 20 TABLET ORAL at 09:00

## 2022-01-11 RX ADMIN — TIOTROPIUM BROMIDE AND OLODATEROL 2 PUFF: 3.124; 2.736 SPRAY, METERED RESPIRATORY (INHALATION) at 13:14

## 2022-01-11 RX ADMIN — INSULIN GLARGINE 20 UNITS: 100 INJECTION, SOLUTION SUBCUTANEOUS at 09:05

## 2022-01-11 RX ADMIN — BUPROPION HYDROCHLORIDE 150 MG: 150 TABLET, EXTENDED RELEASE ORAL at 09:00

## 2022-01-11 RX ADMIN — PROPRANOLOL HYDROCHLORIDE 80 MG: 80 CAPSULE, EXTENDED RELEASE ORAL at 09:01

## 2022-01-11 RX ADMIN — INSULIN LISPRO 2 UNITS: 100 INJECTION, SOLUTION INTRAVENOUS; SUBCUTANEOUS at 21:38

## 2022-01-11 RX ADMIN — ASPIRIN 81 MG: 81 TABLET, COATED ORAL at 09:00

## 2022-01-11 ASSESSMENT — PAIN SCALES - GENERAL
PAINLEVEL_OUTOF10: 0

## 2022-01-11 NOTE — PLAN OF CARE
7a-11p: Assessed x2 (no significant changes from previous assessments). Repostioned self at intervals w/ staff cueing. Some mild hallucinations & odd statements continue. Denied pain. Vascular consult for dialysis fistula (vein mapping), will be done tomorrow as they do not do this on dialysis days (no prep needed). Reports that since she's L handed the fistula will most likely be in her R arm. Antidepressant similar to prozac ordered (home med) & she takes this at night w/ Xanax. Attempted to have bm, just passed gas & voided small amount. Dry cough continues    Problem: Falls - Risk of:  Goal: Will remain free from falls  Description: Will remain free from falls  Outcome: Ongoing  Goal: Absence of physical injury  Description: Absence of physical injury  Outcome: Ongoing  Education Provided as follows:  Family/Patient made aware of the tailored interventions falls plan using the TIPS TOOL.       Problem: IP BOWEL ELIMINATION  Goal: LTG - patient will have regular and routine bowel evacuation  Outcome: Ongoing  Goal: STG - Patient will verbalize signs and symptoms of constipation and how to prevent/alleviate  Outcome: Ongoing     Problem: IP BLADDER/VOIDING  Goal: STG - Patient will state signs and symptoms of UTI  Outcome: Ongoing     Problem: SKIN INTEGRITY  Goal: LTG - Patient will be free from infection  Outcome: Ongoing  Goal: STG - Patient demonstrates preventative skin care measures  Outcome: Ongoing

## 2022-01-11 NOTE — CARE COORDINATION
Social Work Admission Assessment    Objective:  Met with pt and her spouse to complete initial assessment and review role of  in rehab process. Pt oriented to unit. Pt states understanding of this. Current Home Situation:  Patient lives with spouse. They reside in a handicapped accessible condo. (has a ramp entry, handicapped accessible bathroom, wide door entries etc.)     Pt's plans re:  Return to work/school/volunteer:  Patient is disabled and pending SSDI determination. Patient has not worked for a long-time. Patient is legally blind, a dialysis patient etc.    Accessibility to community resources/transportation:  Patient is followed by [address] Has pt experienced a recent loss or signigicant life event that would impact their care or ability to participate? _x_No  __Yes - Explain     Has pt ever been treated for emotional disorders? __No  _x_Yes--How does that affect current situation: Patient is receiving counseling/therapy. How does pt and family cope with stressful events and this hospitalization? Patient states that she caryn with stress well. Special Problem Areas: Patient and spouse have limited income. Spouse waiting on SSDI determination. Spouse does not work due to patient needing 24/7 care. Discharge Plan: To home with needed supports. Impression/Plan: Charlie Crump (patient )is a 55year old female that has been admitted to ARU. Provided patient with this SW's card to contact as needed. Will continue to follow for support and discharge planning.      Electronically signed by LEYDI Grubbs on 1/11/2022 at 4:58 PM [FreeTextEntry1] : I, Dr. Thomas Cary  have read and attest that all the information, medical decision making and discharge instructions within are true and accurate. I personally performed the evaluation and management (E/M) services for this established patient who presents today with (a) new problem(s)/exacerbation of (an) existing condition(s).  That E/M includes conducting the examination, assessing all new/exacerbated conditions, and establishing a new plan of care.  Today, my ACP, Yennifer Nassar PA-C, was here to observe my evaluation and management services for this new problem/exacerbated condition to be followed going forward.\par   Need for prophylactic measure

## 2022-01-11 NOTE — CARE COORDINATION
Team conference held today. Spoke with patient and her spouse (spouse via the phone)  to discuss progress with therapy, barriers to discharge, and plans to return home. Estimated discharge date set for 1/21/2022. Patient's discharge plan to be determined. Will continue to follow for support and discharge planning.     Electronically signed by LEYDI Obregon on 1/11/2022 at 4:28 PM

## 2022-01-11 NOTE — PLAN OF CARE
Problem: Falls - Risk of:  Goal: Will remain free from falls  Description: Will remain free from falls  1/11/2022 0556 by Norm Jim RN  Outcome: Ongoing  1/11/2022 0022 by Devendra Valencia RN  Outcome: Ongoing  1/11/2022 0020 by Devendra Valencia RN  Outcome: Ongoing  Goal: Absence of physical injury  Description: Absence of physical injury  1/11/2022 0556 by Norm Jim RN  Outcome: Ongoing  1/11/2022 0022 by Devendra Valencia RN  Outcome: Ongoing  1/11/2022 0020 by Devendra Valencia RN  Outcome: Ongoing     Problem: IP BOWEL ELIMINATION  Goal: LTG - patient will have regular and routine bowel evacuation  1/11/2022 0556 by Norm Jim RN  Outcome: Ongoing  1/11/2022 0022 by Devendra Valencia RN  Outcome: Ongoing  Goal: STG - Patient will verbalize signs and symptoms of constipation and how to prevent/alleviate  1/11/2022 0556 by Norm Jim RN  Outcome: Ongoing  1/11/2022 0022 by Devendra Valencia RN  Outcome: Ongoing     Problem: IP BLADDER/VOIDING  Goal: STG - Patient will state signs and symptoms of UTI  1/11/2022 0556 by Norm Jim RN  Outcome: Ongoing  1/11/2022 0022 by Devendra Valencia RN  Outcome: Ongoing     Problem: SKIN INTEGRITY  Goal: LTG - Patient will be free from infection  1/11/2022 0556 by Norm Jim RN  Outcome: Ongoing  1/11/2022 0022 by Devendra Valencia RN  Outcome: Ongoing  Goal: STG - Patient demonstrates preventative skin care measures  1/11/2022 0556 by Norm Jim RN  Outcome: Ongoing  1/11/2022 0022 by Devendra Valencia RN  Outcome: Ongoing

## 2022-01-11 NOTE — PROGRESS NOTES
Carlos A Oh  1/11/2022  [id number]    Chief Complaint: Recurrent falls    Subjective:   No overnight events. No current complaints. Reports sleeping very well last night with resumption of her home medications. ROS: No CP, SOB, dyspnea    Objective:  Patient Vitals for the past 24 hrs:   BP Temp Temp src Pulse Resp SpO2 Weight   01/10/22 2023 105/74 97.6 °F (36.4 °C) Axillary 96 16 97 % --   01/10/22 1810 137/73 98.1 °F (36.7 °C) -- 85 18 -- 173 lb 11.6 oz (78.8 kg)   01/10/22 1456 (!) 155/49 97.3 °F (36.3 °C) -- 81 18 -- 177 lb 7.5 oz (80.5 kg)     Gen: No distress, pleasant. Resting on therapy mat  HEENT: Normocephalic, atraumatic. CV: Regular rate and rhythm. No MRG   Resp: No respiratory distress. CTAB   Abd: Soft, nontender nondistended  Ext: No edema. Neuro: Alert, oriented, appropriately interactive. Laboratory data: Available via EMR. Therapy progress:  PT  Position Activity Restriction  Other position/activity restrictions: 51-year-old female with a history of ESRD on HD, diabetes, diabetic neuropathy, legal blindness, CVA with resultant right hemiparesis, HTN, and HLD who was admitted on 1/4 with recurrent falls. Due to increasing difficulty with ambulation and transfers, she had missed 2 dialysis sessions prior to admission. She was recently discharged on 12/29 for an admission with generalized weakness and falls consistent with this admission. She scored well enough with therapy evaluations to discharge to home however it does not appear she is able to care for herself with the assistance of her . She was evaluated by therapy and suggested to continue in an inpatient setting prior to returning home. Patient reports that she had her initial stroke in August and discharged to home with outpatient therapies and subsequently her second stroke in late 2021 resulted in her discharging to [address]   She felt that her therapies were not as effective or intensive as her outpatient therapies. She reports no current complaints. PT Equipment Recommendations  Equipment Needed: Yes  Mobility Devices: Curt Grief: Rolling  Objective     Bridging: Minimal assistance  Scooting: Contact guard assistance  Sit to Stand: Minimal Assistance (multiple completions within session including from EOB, w/c, seated stepper. VC for set up and FWD scooting)  Stand to sit: Minimal Assistance (decreased control, cues for safety)  Ambulation  Device: Rolling Walker  Assistance: Minimal assistance  Distance: 105'  Assessment   Assessment: Pt is making consistent progress in all goal areas with progress noted in transfers, ambulation, and overall stability during standing tasks. Pt demonstrated improved standing balance with ability to self correct. Pt's gross movements are slow. Pt is below functional baseline and would not be safe at home. Pt could benefit from continued skilled physical therapy to restore functional independence. OT  Objective  Feeding: Setup  UE Bathing: Setup,Verbal cueing,Stand by assistance,Increased time to complete  LE Bathing: Moderate assistance  UE Dressing: Minimal assistance  LE Dressing: Setup,Stand by assistance (to change socks only)  Toileting: Dependent/Total  Toilet - Technique: Ambulating  Equipment Used: Extra wide bedside commode  Toilet Transfer: Minimal assistance     Sit to stand: Contact guard assistance,Minimal assistance  Stand to sit: Minimal assistance,Contact guard assistance  Toilet - Technique: Ambulating  Equipment Used: Extra wide bedside commode  Assessment   Assessment: Pt is well below her baseline level of occupation function, based on the above deficits associated with ESRD. Pt has a chronic visual deficit. Pt would benefit from continued skilled acute OT services to address these deficits. SLP  Cognitive Diagnosis: Pt continues to present with mild cognitive linguistic deficits.  High accuracy with naming tasks but continues to complain of effortful word production. Higher level cognitive-linguistic tasks are limited due to visual deficits. Speech Diagnosis: Pt continues to present with slow speech production and hoarse vocal quality at baseline. Will continue to assess pt's speech sound production and treat as appropriate. Body mass index is 27.21 kg/m².     Assessment:  Patient Active Problem List   Diagnosis    Type 2 diabetes mellitus, with long-term current use of insulin (HCC)    Mixed hyperlipidemia    Migraine headache    Anemia    Diabetic foot infection ([address]    Pyogenic inflammation of bone ([address]    History of medication noncompliance    Osteomyelitis of left foot (HCC)    Nephrotic syndrome    Peripheral edema    Pulmonary edema    Right sided numbness    Tobacco dependence    Chronic kidney disease (CKD), stage III (moderate) ([facility]    Chronic diastolic (congestive) heart failure ([facility])    History of CVA (cerebrovascular accident)    Arterial ischemic stroke, ICA, left, acute ([address]    HTN (hypertension), benign    DM (diabetes mellitus), secondary, uncontrolled, w/neurologic complic ([address]    Dyslipidemia    Smoker    Panic disorder    Isolated proteinuria    Acute on chronic diastolic heart failure (HCC)    Diabetic peripheral neuropathy (HCC)    Acute respiratory failure ([facility])    Depression    Abnormal gait    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, associated with type 2 diabetes mellitus ([address]    Cellulitis of right foot    Hidradenitis suppurativa    Hypocalcemia    Non-toxic multinodular goiter    Polyneuropathy due to type 2 diabetes mellitus ([address]    Proliferative diabetic retinopathy associated with type 2 diabetes mellitus ([address]    ESRD (end stage renal disease) ([address]    Acute respiratory failure with hypoxemia ([address]    Acute respiratory failure due to COVID-19 ([address]    Suspected COVID-19 virus infection  Epiglottitis    Recurrent falls       Plan:   Debility: PT/OT     Recurrent falls: PT/OT     ESRD on HD: MWF, Nephro following     DM: lantus 20, SSI     DM neuropathy: lyrica 75     H/O CVA: resultant R hemiparesis per report but none significant on exam     HTN: norvasc 10, clonidine 0.2, lisinopril 40, spironolactone 50     HLD: Lipitor 40     Anx/Dep: wellbutrin , xanax PRN, resumed home luvox 100 HS     Bowels: Per protocol  Bladder: Per protocol   Sleep: Trazodone provided prn. Pain: tylenol, tramadol PRN   DVT PPx: Heparin    GILSON: 1/21    Team conference was held today on the patient and discussed directly with the patient utilizing their entire treatment team. Please see separate team note for details. Total treatment time for today's care >35 min. Sadaf Arnold MD 1/11/2022, 9:06 AM    * This document was created using dictation software. While all precautions were taken to ensure accuracy, errors may have occurred. Please disregard any typographical errors.

## 2022-01-11 NOTE — PROGRESS NOTES
Occupational Therapy  Facility/Department: [facility] ACUTE REHAB UNIT  Daily Treatment Note  NAME: Suann Phoenix  : 1975  MRN: [id number]    Date of Service: 2022    Discharge Recommendations:  Home with Home health OT,24 hour supervision or assist  OT Equipment Recommendations  Equipment Needed: No  Other: owns grab bars, shower chair    Assessment   Performance deficits / Impairments: Decreased functional mobility ; Decreased ADL status; Decreased endurance;Decreased balance;Decreased vision/visual deficit; Decreased safe awareness  Assessment: Pt is well below her baseline level of occupational function, based on the above deficits associated with debility and recurrent. Pt has a chronic visual deficit. Pt would benefit from continued skilled acute OT services to address these deficits. Pt is progressing well, demo's increased ability to complete UB tasks and requiring less VCs to attend to activities. Continue POC  Treatment Diagnosis: Decreased ADL status, functional mobility, endurance, balance, and safety awareness associated with ESRD, debility,and recurrent falls  Prognosis: Good  OT Education: OT Role;Plan of Care;ADL Adaptive Strategies;Transfer Training  Patient Education: continue to reinforce for carryover  Barriers to Learning: Visual  REQUIRES OT FOLLOW UP: Yes  Activity Tolerance  Activity Tolerance: Patient Tolerated treatment well;Patient limited by fatigue  Safety Devices  Safety Devices in place: Yes  Type of devices: Left in chair; All fall risk precautions in place;Call light within reach; Chair alarm in place         Patient Diagnosis(es): Debility     has a past medical history of Blind in both eyes, Cerebral artery occlusion with cerebral infarction [facility]), CHF (congestive heart failure) ([facility] Chronic kidney disease, Depression, Diabetes mellitus out of control ([facility] Diabetes mellitus, type II ([facility] Diabetic neuropathy associated with type 2 diabetes mellitus ([facility] Generalized headaches, Hypertension, Infertility, Insomnia, Migraine headache, Mixed hyperlipidemia, Otitis media, Pelvic abscess in female, Pneumonia, Stroke [facility]), and Stroke ([facility] has a past surgical history that includes Cervix surgery; eye surgery; Foot surgery (Right); Foot surgery (Bilateral); Foot surgery (Left); and IR TUNNELED CVC PLACE WO SQ PORT/PUMP > 5 YEARS (9/7/2021). Restrictions  Restrictions/Precautions  Restrictions/Precautions: Fall Risk  Required Braces or Orthoses?: No  Position Activity Restriction  Other position/activity restrictions: 51-year-old female with a history of ESRD on HD, diabetes, diabetic neuropathy, legal blindness, CVA with resultant right hemiparesis, HTN, and HLD who was admitted on 1/4 with recurrent falls. Due to increasing difficulty with ambulation and transfers, she had missed 2 dialysis sessions prior to admission. She was recently discharged on 12/29 for an admission with generalized weakness and falls consistent with this admission. She scored well enough with therapy evaluations to discharge to home however it does not appear she is able to care for herself with the assistance of her . She was evaluated by therapy and suggested to continue in an inpatient setting prior to returning home. Patient reports that she had her initial stroke in August and discharged to home with outpatient therapies and subsequently her second stroke in late 2021 resulted in her discharging to [address]. She felt that her therapies were not as effective or intensive as her outpatient therapies. She reports no current complaints.     Subjective   General  Chart Reviewed: Yes  Patient assessed for rehabilitation services?: Yes  Family / Caregiver Present: No  Diagnosis: Debility, Recurrent Falls  Subjective  Subjective: Pt in recliner at entry, agreeable to session  Vital Signs  Patient Currently in Pain: Denies          Objective    ADL  Grooming: Contact guard assistance (oral care, face washing, and hair brushing at sink)  UE Dressing: Setup;Stand by assistance; Increased time to complete  Additional Comments: Pt ambulated to/from bathroom for grooming- began grooming tasks in stance at sink- increased R lean with fatigue, after ~3min pt completed remainder of tasks seated at sink- increased time and min VCs to attend/sequence. Pt changed UB clothing seated in recliner with increased time           Balance  Sitting Balance: Stand by assistance  Standing Balance: Minimal assistance (CGA to Katrin)  Standing Balance  Time: 5-7min total  Activity: functional mobility, transfers, ADL completion    Functional Mobility  Functional - Mobility Device: Rolling Walker  Activity: To/from bathroom  Assist Level: Contact guard assistance        Transfers  Sit to stand: Contact guard assistance  Stand to sit: Contact guard assistance                          Cognition  Arousal/Alertness: Delayed responses to stimuli  Following Commands: Follows one step commands consistently; Follows one step commands with increased time  Attention Span: Attends with cues to redirect  Memory: Appears intact  Safety Judgement: Decreased awareness of need for assistance  Problem Solving: Assistance required to generate solutions;Assistance required to implement solutions  Insights: Decreased awareness of deficits  Initiation: Requires cues for some  Sequencing: Requires cues for some                          Second Session:  Pt seated in recliner at entry, agreeable to session, denied pain. Pt ambulated to/from bathroom for toileting with RW at [facility] and increased time. Pt incontinent of small amount of stool, brief change completed seated on commode- pt able to figure-four sit as needed for donning/doffing pants/brief with increased time at SBA. Katrin required for clothing over hips and davion hygiene thoroughness in stance- increased time.  EOM core/UB TE completed for strength/activity tolerance: lateral arm glides x5 to R/L, trunk rotations into shoulder flexion sequence holding 5#ball (direction of L<>R changed periodically during rotations to challenge command follow/attention- ~75% accuracy). Sit<>stands throughout session with CGA to Katrin. Pt left seated in w/c in room at [facility]- staff present to transport pt to vascular.                        Plan   Plan  Times per week: 75 minutes 5 days per week  Times per day: Daily  Current Treatment Recommendations: Safety Education & Training,Self-Care / Josiah Prieto Mobility Training,Balance Training          Goals  Short term goals  Time Frame for Short term goals: 2 weeks  Short term goal 1: SBA functional transfers to toilet and shower-- not met, progressing  Short term goal 2: SBA UB ADLs-- not met, progressing  Short term goal 3: min A LB ADLs-- not met, progressing  Short term goal 4: CGA functional mobility for ADLs-- not met, progressing  Patient Goals   Patient goals : to get stronger, stop falling       Therapy Time   Individual Concurrent Group Co-treatment   Time In 0930         Time Out 1000         Minutes 30         Timed Code Treatment Minutes: 30 Minutes     Second Session Therapy Time:   Individual Concurrent Group Co-treatment   Time In 5466        Time Out 1400        Minutes 45          Timed Code Treatment Minutes:  30 + 45     Total Treatment Minutes:  [address] [facility], [facility] [address] [place] [address]

## 2022-01-11 NOTE — PROGRESS NOTES
ACUTE REHAB UNIT  SPEECH/LANGUAGE PATHOLOGY      [x] Daily  [x] Weekly Care Conference Note  [] Discharge    Patient:Kristine Sanders     TEU:[birth year]  EUF:[id number]  Room #: MFQ-5146/2569-11   Rehab Dx/Hx: Recurrent falls [R29.6]  Date of Admit: 1/6/2022      Precautions: falls  Home situation: Lives at home with her ; Not an active ;  completes finances and places pills into pill organizer for pt)   ST Dx: Cognitive Linguistic Deficits     Daily Treatment Info:   Date: 1/11/2022     Tx session 1   Pain None reported    Subjective     Pt up in chair for session, pleasant and agreeable to participate. Objective:  Goals    Pt will complete word associative tasks to improve mental flexibility, complex thinking, and working memory skills with 90% accuracy. Pt did not exhibit any word finding difficulty during structured description task. Category Exclusion from word list f/5  - was able to appropriately identify the item that did not belong in a category given 5 words in 5/5 opportunities. Functional recall   - appropriately recalled tasks completed this morning; 7/8 morning medications with a description of their purpose     PROGRESSING - Avg 94% across tasks and sessions     Pt will complete executive function tasks (i.e. planning, deductive reasoning, inferential, functional organization tasks) with 80% accuracy independently. Goal not targeted this session. ONGOING    Continue to monitor speech sound production with additional goals to be added as warranted. Speech was recorded and played back to the pt ([address] [place] [address])   - self rated her speech at 8-9/10 from baseline   - Independently identified speech sound errors (\"slurred\"); she was able to correct the errors and produced with 100% accuracy on second attempt     Pt demonstrated improved vocal intensity and speech fluency in comparison to previous sessions. Pt required mod-max cues for pitch variation. PROGRESSING - Improving speech intensity and fluency      Other areas targeted:    Education:   Provided education regarding rationale for tasks and plan of care. Pt verbalized understanding    Safety Devices: [x] Call light within reach  [x] Chair alarm activated  [] Bed alarm activated  [] Other:     Assessment:   Weekly Update:   Speech Therapy Diagnosis  Cognitive Diagnosis: Pt continues to present with mild cognitive linguistic deficits. High accuracy with naming tasks but continues to complain of effortful word production. Higher level cognitive-linguistic tasks are limited due to visual deficits. Speech Diagnosis: Pt continues to present with slow speech production and hoarse vocal quality at baseline. Will continue to assess pt's speech sound production and treat as appropriate. Current Diet Order: ADULT DIET; Regular; 4 carb choices (60 gm/meal)            Plan:     Frequency:  5days/week   30 minutes/day    Discharge Recommendations:   Barriers: Visual deficits (legally blind)   Discharge Recommendations:  [] Home independently  [x] Home with assistance []  24 hour supervision  [] SNF [] Other:  Continued SLP Treatment:  [] Yes [] No [x] TBD based on progress while on ARU [] Vital Stim indicated [] Other:   Estimated discharge date: 1/21/2022     Session 1 Times: Individual Concurrent Group Co-treatment   Time In  0900         Time Out  0930         Minutes  30         Time Code Minutes  15        Timed Code Treatment Minutes:   15    Total Treatment Minutes:  30    Electronically Signed by     Hasmukh Cardenas M.A.  CCC-SLP MARJAN [id number]  Speech-Language Pathologist   1/11/2022 10:52 AM

## 2022-01-11 NOTE — PROGRESS NOTES
Physical Therapy  Facility/Department: [facility] ACUTE REHAB UNIT  Daily Treatment Note  NAME: Douglas Mckinley  : 1975  MRN: [id number]    Date of Service: 2022    Discharge Recommendations:  24 hour supervision or assist,Home with Home health PT   PT Equipment Recommendations  Equipment Needed: Yes  Walker: Rolling    Assessment   Body structures, Functions, Activity limitations: Decreased functional mobility ; Decreased endurance;Decreased balance;Decreased strength;Decreased posture;Decreased ADL status; Decreased sensation;Decreased safe awareness;Decreased cognition;Decreased vision/visual deficit  Assessment: Pt demonstrating improved posture and ability to correct balance. She is making encourgaing improvements to her bed mobility and activity tolerance. She continues needing significant VC and TC for setup and completion of transfers, ambulation, and stair negotiation. She would continue to benefit from skilled phyiscal therapy to improve functional independence and return to her PLOF. Treatment Diagnosis: impaired balance, impaired gait, generalized weakness  Prognosis: Good  PT Education: Goals; General Safety;Gait Training;PT Role;Functional Mobility Training; Low Vision Education;Transfer Training  Barriers to Learning: visual deficits, cognitive deficits  REQUIRES PT FOLLOW UP: Yes  Activity Tolerance  Activity Tolerance: Patient Tolerated treatment well  Activity Tolerance: P/t demonstrated increased activity tolerance with increased L knee soreness. Requires intermittent rest breaks between standing activities.      Patient Diagnosis(es): Recurrent falls   has a past medical history of Blind in both eyes, Cerebral artery occlusion with cerebral infarction [facility]), CHF (congestive heart failure) ([facility] [address] Chronic kidney disease, Depression, Diabetes mellitus out of control ([facility] [address] Diabetes mellitus, type II ([facility] [address] Diabetic neuropathy associated with type 2 diabetes mellitus ([facility] [address] Generalized headaches, Hypertension, Infertility, Insomnia, Migraine headache, Mixed hyperlipidemia, Otitis media, Pelvic abscess in female, Pneumonia, Stroke [facility]), and Stroke ([facility] [address] has a past surgical history that includes Cervix surgery; eye surgery; Foot surgery (Right); Foot surgery (Bilateral); Foot surgery (Left); and IR TUNNELED CVC PLACE WO SQ PORT/PUMP > 5 YEARS (9/7/2021). Restrictions  Restrictions/Precautions  Restrictions/Precautions: Fall Risk  Required Braces or Orthoses?: No  Position Activity Restriction  Other position/activity restrictions: 51-year-old female with a history of ESRD on HD, diabetes, diabetic neuropathy, legal blindness, CVA with resultant right hemiparesis, HTN, and HLD who was admitted on 1/4 with recurrent falls. Due to increasing difficulty with ambulation and transfers, she had missed 2 dialysis sessions prior to admission. She was recently discharged on 12/29 for an admission with generalized weakness and falls consistent with this admission. She scored well enough with therapy evaluations to discharge to home however it does not appear she is able to care for herself with the assistance of her . She was evaluated by therapy and suggested to continue in an inpatient setting prior to returning home. Patient reports that she had her initial stroke in August and discharged to home with outpatient therapies and subsequently her second stroke in late 2021 resulted in her discharging to [address]. She felt that her therapies were not as effective or intensive as her outpatient therapies. She reports no current complaints. Subjective   General  Chart Reviewed: Yes  Additional Pertinent Hx: Hx of CVA x 2 with multiple fulls in home enviornment  Family / Caregiver Present: No  Referring Practitioner: Dr. Zane Campbell  Subjective  Subjective: P/t agreeable to PT this date. States medication taken last night \"knocked\" her out and she slept well.   Pt reports soreness in (B) LEs however denies pain. General Comment  Comments: supine on arrival with [facility] elevated  Pain Screening  Patient Currently in Pain: Other (comment) (Soreness but denies pain)  Vital Signs  Patient Currently in Pain: Other (comment) (Soreness but denies pain)       Objective   Bed mobility  Rolling to Right: Stand by assistance (HOB Flat)  Supine to Sit: Stand by assistance (HOB Flat)  Scooting: Contact guard assistance  Transfers  Sit to Stand: Minimal Assistance (CGA regressing to min (A) d/t fatigue. Multiple completions within session including from W/C, therapy mat, seated stepper, and recliner. VC for set up and preparartion.)  Stand to sit: Minimal Assistance (decreased control, VC cues for safety)  Stand Pivot Transfers: Minimal Assistance (bed => w/c <=> seated stepper. VC for upright posture and procedure)  Ambulation  Ambulation?: Yes  Ambulation 1  Surface: level tile  Device: Rolling Walker  Assistance: Minimal assistance (for walker management and steering)  Quality of Gait: narrow ROMAINE  Gait Deviations: Slow Kinjal;Decreased step length;Deviated path  Distance: 163'+ multiple short distances  Comments: Obsticles placed in path to force pt to maneuver around. Requires assistance for walker stabilization during ambulation. Pt diffculty scanning visual field and maneuvering around objects in (L) visual field. Stairs/Curb  Stairs?: Yes  Stairs  # Steps : 3 (2 sets with seated rest in between)  Stairs Height: 6\"  Rails: Bilateral  Assistance: Minimal assistance  Comment: step to pattern leading with (R) LE to ascend, (L) LE to descend; posterior and (R) lean noted; decreased ability to clear trail foot while descending.  Required VC and TC to correct     Balance  Posture: Fair  Sitting - Static: Fair;+ (Pt sat edge of therapy mat for > 5 min)  Sitting - Dynamic: Poor;+  Standing - Static: Fair;-  Standing - Dynamic: Poor;+  Comments: progressive posterior lean with sitting EOB, ankle strategy primarily used when standing balance is challenged, balance worsens when distracted with increased sway. Can self correct with cues, lacks initiative to self correct. Other exercises  Other exercises?: Yes  Other exercises 1: Lateral Walking at ballet bars 10 ft x 2ea with single UE support. CGA--Katrin for balance. VC for upright posture and UE/LE positioning. Other exercises 2: Dynamic reaching + lateral stepping tasks with single UE support @ ballet bars. CGA--Katrni for balance and UE+LE placement 10 ft x 2 ea. Other exercises 4: Seated stepper L1 x 6 min with VC for speed. Katrin at (L) UE for task facilitation (PT requires increased attention to task in order to complete effectively)  Other exercises 5: Sit <=> stand performed @ therapy mat with (B) UE pushing from mat 2x5 with VC + TC for upright posture and mechanics as well as controlled eccentric control with sitting      Comment: Therapist assisted with donning shoes upon beggining session secondary to time constraints. Goals  Short term goals  Time Frame for Short term goals: 2 weeks  Short term goal 1: Pt to complete bed mobility at modified independent level with use of bed rail. Short term goal 2: Pt to complete transfers at [facility]  Short term goal 3: Pt to ambulate 150 ft with RW at [facility]  Short term goal 4: Pt to ascend/descend 4 steps with (B) HR at [facility]  Short term goal 5: Pt to complete car transfer at [facility]  Patient Goals   Patient goals : To reduce falling episodes    Plan    Plan  Times per week: 5x/week, 75 min/day  Times per day: Daily  Current Treatment Recommendations: Strengthening,Balance Training,Functional Mobility Training,Transfer Training,ADL/Self-care Training,Stair training,Endurance Training,Gait Training,Neuromuscular Re-education,Positioning,Modalities,Patient/Caregiver Education & Training,Safety Education & Training  Safety Devices  Type of devices:  All fall risk precautions in place,Call light within reach,Gait belt,Chair alarm in place,Left in chair  Restraints  Initially in place: No     Therapy Time   Individual Concurrent Group Co-treatment   Time In 0730         Time Out 0845         Minutes 75         Timed Code Treatment Minutes: 175 [facility], [facility]  Therapist observed and directed the patient's plan of care.   Co-signed and supervised by: Amanda Matthew PT, DPT - [id number]

## 2022-01-12 ENCOUNTER — ANESTHESIA EVENT (OUTPATIENT)
Dept: OPERATING ROOM | Age: 47
End: 2022-01-12

## 2022-01-12 LAB
ALBUMIN SERPL-MCNC: 3.2 G/DL (ref 3.4–5)
ANION GAP SERPL CALCULATED.3IONS-SCNC: 15 MMOL/L (ref 3–16)
BUN BLDV-MCNC: 62 MG/DL (ref 7–20)
CALCIUM SERPL-MCNC: 9.1 MG/DL (ref 8.3–10.6)
CHLORIDE BLD-SCNC: 99 MMOL/L (ref 99–110)
CO2: 22 MMOL/L (ref 21–32)
CREAT SERPL-MCNC: 6.3 MG/DL (ref 0.6–1.1)
GFR AFRICAN AMERICAN: 9
GFR NON-AFRICAN AMERICAN: 7
GLUCOSE BLD-MCNC: 109 MG/DL (ref 70–99)
GLUCOSE BLD-MCNC: 141 MG/DL (ref 70–99)
GLUCOSE BLD-MCNC: 156 MG/DL (ref 70–99)
GLUCOSE BLD-MCNC: 85 MG/DL (ref 70–99)
HCT VFR BLD CALC: 31.3 % (ref 36–48)
HEMOGLOBIN: 9.8 G/DL (ref 12–16)
MCH RBC QN AUTO: 25.3 PG (ref 26–34)
MCHC RBC AUTO-ENTMCNC: 31.2 G/DL (ref 31–36)
MCV RBC AUTO: 81.2 FL (ref 80–100)
PDW BLD-RTO: 16.9 % (ref 12.4–15.4)
PERFORMED ON: ABNORMAL
PERFORMED ON: ABNORMAL
PERFORMED ON: NORMAL
PHOSPHORUS: 6.7 MG/DL (ref 2.5–4.9)
PLATELET # BLD: 283 K/UL (ref 135–450)
PMV BLD AUTO: 8.6 FL (ref 5–10.5)
POTASSIUM SERPL-SCNC: 4.6 MMOL/L (ref 3.5–5.1)
RBC # BLD: 3.85 M/UL (ref 4–5.2)
SARS-COV-2, NAAT: DETECTED
SODIUM BLD-SCNC: 136 MMOL/L (ref 136–145)
WBC # BLD: 12.2 K/UL (ref 4–11)

## 2022-01-12 PROCEDURE — 97530 THERAPEUTIC ACTIVITIES: CPT

## 2022-01-12 PROCEDURE — APPNB30 APP NON BILLABLE TIME 0-30 MINS: Performed by: NURSE PRACTITIONER

## 2022-01-12 PROCEDURE — 6370000000 HC RX 637 (ALT 250 FOR IP): Performed by: PHYSICAL MEDICINE & REHABILITATION

## 2022-01-12 PROCEDURE — 97116 GAIT TRAINING THERAPY: CPT

## 2022-01-12 PROCEDURE — 1280000000 HC REHAB R&B

## 2022-01-12 PROCEDURE — 94761 N-INVAS EAR/PLS OXIMETRY MLT: CPT

## 2022-01-12 PROCEDURE — 90935 HEMODIALYSIS ONE EVALUATION: CPT

## 2022-01-12 PROCEDURE — 94640 AIRWAY INHALATION TREATMENT: CPT

## 2022-01-12 PROCEDURE — 87635 SARS-COV-2 COVID-19 AMP PRB: CPT

## 2022-01-12 PROCEDURE — 99232 SBSQ HOSP IP/OBS MODERATE 35: CPT | Performed by: SURGERY

## 2022-01-12 PROCEDURE — U0003 INFECTIOUS AGENT DETECTION BY NUCLEIC ACID (DNA OR RNA); SEVERE ACUTE RESPIRATORY SYNDROME CORONAVIRUS 2 (SARS-COV-2) (CORONAVIRUS DISEASE [COVID-19]), AMPLIFIED PROBE TECHNIQUE, MAKING USE OF HIGH THROUGHPUT TECHNOLOGIES AS DESCRIBED BY CMS-2020-01-R: HCPCS

## 2022-01-12 PROCEDURE — 97112 NEUROMUSCULAR REEDUCATION: CPT

## 2022-01-12 PROCEDURE — U0005 INFEC AGEN DETEC AMPLI PROBE: HCPCS

## 2022-01-12 PROCEDURE — 36415 COLL VENOUS BLD VENIPUNCTURE: CPT

## 2022-01-12 PROCEDURE — 97129 THER IVNTJ 1ST 15 MIN: CPT

## 2022-01-12 PROCEDURE — 6360000002 HC RX W HCPCS: Performed by: PHYSICAL MEDICINE & REHABILITATION

## 2022-01-12 PROCEDURE — 80069 RENAL FUNCTION PANEL: CPT

## 2022-01-12 PROCEDURE — APPSS30 APP SPLIT SHARED TIME 16-30 MINUTES: Performed by: NURSE PRACTITIONER

## 2022-01-12 PROCEDURE — 97535 SELF CARE MNGMENT TRAINING: CPT

## 2022-01-12 PROCEDURE — 85027 COMPLETE CBC AUTOMATED: CPT

## 2022-01-12 PROCEDURE — 97130 THER IVNTJ EA ADDL 15 MIN: CPT

## 2022-01-12 RX ADMIN — FUROSEMIDE 10 MG: 20 TABLET ORAL at 08:25

## 2022-01-12 RX ADMIN — ASPIRIN 81 MG: 81 TABLET, COATED ORAL at 08:25

## 2022-01-12 RX ADMIN — ATORVASTATIN CALCIUM 40 MG: 40 TABLET, FILM COATED ORAL at 21:40

## 2022-01-12 RX ADMIN — FLUVOXAMINE MALEATE 100 MG: 50 TABLET, COATED ORAL at 21:49

## 2022-01-12 RX ADMIN — LISINOPRIL 40 MG: 20 TABLET ORAL at 08:26

## 2022-01-12 RX ADMIN — PREGABALIN 75 MG: 75 CAPSULE ORAL at 21:41

## 2022-01-12 RX ADMIN — ALPRAZOLAM 0.75 MG: 0.5 TABLET ORAL at 08:38

## 2022-01-12 RX ADMIN — INSULIN GLARGINE 20 UNITS: 100 INJECTION, SOLUTION SUBCUTANEOUS at 08:32

## 2022-01-12 RX ADMIN — HEPARIN SODIUM 5000 UNITS: 5000 INJECTION INTRAVENOUS; SUBCUTANEOUS at 05:56

## 2022-01-12 RX ADMIN — INSULIN LISPRO 2 UNITS: 100 INJECTION, SOLUTION INTRAVENOUS; SUBCUTANEOUS at 11:51

## 2022-01-12 RX ADMIN — BENZONATATE 200 MG: 100 CAPSULE ORAL at 21:40

## 2022-01-12 RX ADMIN — ALPRAZOLAM 0.75 MG: 0.5 TABLET ORAL at 21:41

## 2022-01-12 RX ADMIN — BUPROPION HYDROCHLORIDE 150 MG: 150 TABLET, EXTENDED RELEASE ORAL at 08:25

## 2022-01-12 RX ADMIN — BENZONATATE 200 MG: 100 CAPSULE ORAL at 08:38

## 2022-01-12 RX ADMIN — SPIRONOLACTONE 50 MG: 25 TABLET ORAL at 08:26

## 2022-01-12 RX ADMIN — HEPARIN SODIUM 5000 UNITS: 5000 INJECTION INTRAVENOUS; SUBCUTANEOUS at 21:44

## 2022-01-12 RX ADMIN — PROPRANOLOL HYDROCHLORIDE 80 MG: 80 CAPSULE, EXTENDED RELEASE ORAL at 10:13

## 2022-01-12 RX ADMIN — TIOTROPIUM BROMIDE AND OLODATEROL 2 PUFF: 3.124; 2.736 SPRAY, METERED RESPIRATORY (INHALATION) at 04:36

## 2022-01-12 RX ADMIN — AMLODIPINE BESYLATE 10 MG: 5 TABLET ORAL at 08:26

## 2022-01-12 ASSESSMENT — PAIN SCALES - GENERAL
PAINLEVEL_OUTOF10: 0

## 2022-01-12 ASSESSMENT — PAIN DESCRIPTION - INTENSITY
RATING_2: 0

## 2022-01-12 ASSESSMENT — ENCOUNTER SYMPTOMS: SHORTNESS OF BREATH: 1

## 2022-01-12 NOTE — PLAN OF CARE
Results for Dada An (MRN [id number]) as of 1/12/2022 14:57   Ref. Range 1/12/2022 14:23   SARS-CoV-2, NAAT Latest Ref Range: Not Detected  DETECTED (AA)     Outreach call to HD to notify HD RN of Dx. Outreach call to General Surgery to update on Rapid result- call transferred to [facility] [place] [facility], Surgical Assistant.  Penny to notify Surgical team.    Amber PICKERINGN, RN   [phone]

## 2022-01-12 NOTE — PROGRESS NOTES
Zee Ava  1/12/2022  [id number]    Chief Complaint: Recurrent falls    Subjective:   No overnight events. No current complaints. No hallucinations reported yesterday. Alertness intermittently low. ROS: No CP, SOB, dyspnea    Objective:  Patient Vitals for the past 24 hrs:   BP Temp Temp src Pulse Resp SpO2 Weight   01/12/22 0818 125/67 97.9 °F (36.6 °C) Oral 81 18 95 % --   01/12/22 0436 -- -- -- -- 18 92 % --   01/11/22 2000 (!) 132/90 97.9 °F (36.6 °C) Oral 83 18 90 % --   01/11/22 1315 -- -- -- -- 18 98 % --   01/11/22 1142 -- -- -- -- -- -- 180 lb 1.6 oz (81.7 kg)   01/11/22 0939 110/70 97.6 °F (36.4 °C) Oral 98 17 97 % --     Gen: No distress, pleasant. Resting in bed  HEENT: Normocephalic, atraumatic. CV: Regular rate and rhythm. No MRG   Resp: No respiratory distress. CTAB   Abd: Soft, nontender nondistended  Ext: No edema. Neuro: Alert, oriented, appropriately interactive. Laboratory data: Available via EMR. Therapy progress:  PT  Position Activity Restriction  Other position/activity restrictions: 51-year-old female with a history of ESRD on HD, diabetes, diabetic neuropathy, legal blindness, CVA with resultant right hemiparesis, HTN, and HLD who was admitted on 1/4 with recurrent falls. Due to increasing difficulty with ambulation and transfers, she had missed 2 dialysis sessions prior to admission. She was recently discharged on 12/29 for an admission with generalized weakness and falls consistent with this admission. She scored well enough with therapy evaluations to discharge to home however it does not appear she is able to care for herself with the assistance of her . She was evaluated by therapy and suggested to continue in an inpatient setting prior to returning home.   Patient reports that she had her initial stroke in August and discharged to home with outpatient therapies and subsequently her second stroke in late 2021 resulted in her discharging to [email or web address].  She felt that her therapies were not as effective or intensive as her outpatient therapies. She reports no current complaints. PT Equipment Recommendations  Equipment Needed: Yes  Mobility Devices: Camille Estrin: Rolling  Objective     Bridging: Minimal assistance  Scooting: Contact guard assistance  Sit to Stand: Minimal Assistance (CGA regressing to min (A) d/t fatigue. Multiple completions within session including from W/C, therapy mat, seated stepper, and recliner. VC for set up and preparartion.)  Stand to sit: Minimal Assistance (decreased control, VC cues for safety)  Ambulation  Device: Rolling Walker  Assistance: Minimal assistance (for walker management and steering)  Distance: 163'+ multiple short distances  Assessment   Assessment: Pt demonstrating improved posture and ability to correct balance. She is making encourgaing improvements to her bed mobility and activity tolerance. She continues needing significant VC and TC for setup and completion of transfers, ambulation, and stair negotiation. She would continue to benefit from skilled phyiscal therapy to improve functional independence and return to her PLOF. OT  Objective  Feeding: Setup  UE Bathing: Setup,Verbal cueing,Stand by assistance,Increased time to complete  LE Bathing: Moderate assistance  UE Dressing: Setup,Stand by assistance,Increased time to complete  LE Dressing: Setup,Stand by assistance (to change socks only)  Toileting: Dependent/Total  Toilet - Technique: Ambulating  Equipment Used: Extra wide bedside commode  Toilet Transfer: Minimal assistance     Sit to stand: Contact guard assistance  Stand to sit: Contact guard assistance  Toilet - Technique: Ambulating  Equipment Used: Extra wide bedside commode  Assessment   Assessment: Pt is well below her baseline level of occupational function, based on the above deficits associated with debility and recurrent. Pt has a chronic visual deficit.  Pt would benefit from continued skilled acute OT services to address these deficits. Pt is progressing well, demo's increased ability to complete UB tasks and requiring less VCs to attend to activities. Continue POC    SLP  Cognitive Diagnosis: Pt continues to present with mild cognitive linguistic deficits. High accuracy with naming tasks but continues to complain of effortful word production. Higher level cognitive-linguistic tasks are limited due to visual deficits. Speech Diagnosis: Pt continues to present with slow speech production and hoarse vocal quality at baseline. Will continue to assess pt's speech sound production and treat as appropriate. Body mass index is 28.21 kg/m².     Assessment:  Patient Active Problem List   Diagnosis    Type 2 diabetes mellitus, with long-term current use of insulin (HCC)    Mixed hyperlipidemia    Migraine headache    Anemia    Diabetic foot infection ([address]    Pyogenic inflammation of bone ([address]    History of medication noncompliance    Osteomyelitis of left foot (HCC)    Nephrotic syndrome    Peripheral edema    Pulmonary edema    Right sided numbness    Tobacco dependence    Chronic kidney disease (CKD), stage III (moderate) (HCC)    Chronic diastolic (congestive) heart failure (HCC)    History of CVA (cerebrovascular accident)    Arterial ischemic stroke, ICA, left, acute ([address]    HTN (hypertension), benign    DM (diabetes mellitus), secondary, uncontrolled, w/neurologic complic ([address]    Dyslipidemia    Smoker    Panic disorder    Isolated proteinuria    Acute on chronic diastolic heart failure (HCC)    Diabetic peripheral neuropathy (HCC)    Acute respiratory failure (HCC)    Depression    Abnormal gait    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 [address]    ESRD (end stage renal disease) ([facility] [address]    Acute respiratory failure with hypoxemia (HCC)    [facility]-19 [facility]    Suspected COVID-19 virus infection    Epiglottitis    Recurrent falls       Plan:   Debility: PT/OT     Recurrent falls: PT/OT     ESRD on HD: MWF, Nephro following     DM: lantus 20, SSI     DM neuropathy: lyrica 75     H/O CVA: resultant R hemiparesis per report but none significant on exam     HTN: norvasc 10, clonidine 0.2, lisinopril 40, spironolactone 50     HLD: Lipitor 40     Anx/Dep: wellbutrin , xanax PRN, resumed home luvox 100 HS     Bowels: Per protocol  Bladder: Per protocol   Sleep: Trazodone provided prn. Pain: tylenol, tramadol PRN   DVT PPx: Heparin    GILSON: 1/21    Ruben Joyce MD 1/12/2022, 9:02 AM    * This document was created using dictation software. While all precautions were taken to ensure accuracy, errors may have occurred. Please disregard any typographical errors.

## 2022-01-12 NOTE — ANESTHESIA PRE PROCEDURE
Department of Anesthesiology  Preprocedure Note       Name:  Viet Arteaga   Age:  55 y.o.  :  1975                                          MRN:  [id number]         Date:  2022      Surgeon: Marcela Berman):  Bentley Maurer MD    Procedure: Procedure(s):  CREATION OF RIGHT BRACHIO-CEPHALIC FISTULA    Medications prior to admission:   Prior to Admission medications    Medication Sig Start Date End Date Taking? Authorizing Provider   ALPRAZolam (XANAX XR) 3 MG extended release tablet Take 1 tablet by mouth every morning for 3 days.  22 Yes Gemini Euceda MD   pregabalin (LYRICA) 75 MG capsule TAKE ONE CAPSULE BY MOUTH EVERY NIGHT 1/3/22 2/2/22 Yes Damon Mireles   furosemide (LASIX) 40 MG tablet Take 40 mg by mouth daily  21  Yes Historical Provider, MD   spironolactone (ALDACTONE) 50 MG tablet TAKE ONE TABLET BY MOUTH DAILY 12/10/21  Yes Damon Mireles   propranolol (INDERAL LA) 80 MG extended release capsule TAKE ONE CAPSULE BY MOUTH EVERY MORNING 12/10/21  Yes Damon Mireles   hydrOXYzine (ATARAX) 25 MG tablet  10/14/21  Yes Historical Provider, MD   benzonatate (TESSALON) 200 MG capsule Take 1 capsule by mouth every 8 hours as needed for Cough 21  Yes Damon Mireles   cloNIDine (CATAPRES) 0.2 MG/24HR PTWK Place 1 patch onto the skin once a week 21  Yes Damon Mireles   lisinopril (PRINIVIL;ZESTRIL) 40 MG tablet Take 40 mg by mouth daily   Yes Historical Provider, MD   atorvastatin (LIPITOR) 40 MG tablet Take 1 tablet by mouth nightly 21  Yes Damon Mireles   aspirin 81 MG EC tablet Take 1 tablet by mouth daily 20  Yes Ayleen Phlegm, MD   albuterol (PROVENTIL) 2.5 MG/0.5ML NEBU nebulizer solution Take 0.5 mLs by nebulization every 6 hours as needed for Wheezing or Shortness of Breath 21   Damon Mireles   Heat Wraps [facility] EUSEBIA BACK/HIP) MISC 1 each by Does not apply route daily as needed (back pain) 12/15/21   [facility] salicylate-menthol (RANDI LEBLANC GREASELESS) 10-15 % CREA Apply topically 3 times daily as needed for Pain 12/15/21   Ghassan Knapp   buPROPion (WELLBUTRIN XL) 150 MG extended release tablet Take 1 tablet by mouth every morning 12/15/21   Damon Mireles   amLODIPine (NORVASC) 10 MG tablet  11/17/21   Historical Provider, MD   albuterol sulfate  (90 Base) MCG/ACT inhaler Inhale 2 puffs into the lungs every 6 hours as needed for Wheezing or Shortness of Breath 11/30/21   Damon Mireles   fluvoxaMINE (LUVOX) 100 MG tablet Take 1 tablet by mouth nightly 11/30/21 12/30/21  Damon Mireles   Dulaglutide 1.5 MG/0.5ML SOPN Inject 1.5 mg into the skin once a week 11/30/21   Ghassan Knapp   Misc.  Devices (PULSE OXIMETER) MISC 1 each by Does not apply route every 6-8 hours as needed (shortness of breath) 11/1/21   Damon Mireles   Nasal Dilators (BREATHE RIGHT EXTRA STRENGTH) STRP 1 strip by Does not apply route nightly as needed (nasal congestion) 11/1/21   Damon Mireles   albuterol sulfate HFA (PROVENTIL HFA) 108 (90 Base) MCG/ACT inhaler Inhale 2 puffs into the lungs every 4 hours as needed for Wheezing 10/21/21   Michelle Bryan MD   insulin glargine (LANTUS SOLOSTAR) 100 UNIT/ML injection pen Inject 10 Units into the skin every morning     Historical Provider, MD   glycopyrrolate-formoterol (BEVESPI AEROSPHERE) 9-4.8 MCG/ACT AERO Inhale 2 puffs into the lungs 2 times daily 9/23/21   Damon Mireles   glucose (GLUTOSE) 40 % GEL Take 37.5 mLs by mouth as needed (low blood sugar) 9/13/21   Tian Eastman MD   Insulin Pen Needle 29G X 12.7MM MISC 1 each by Does not apply route daily 7/8/21   Damon Mireles   insulin lispro, 1 Unit Dial, 100 UNIT/ML SOPN Inject 0-6 Units into the skin 3 times daily (with meals) **Corrective Low Dose Algorithm**  Glucose: Dose:               No Insulin  140-199 1 Unit  200-249 2 Units  250-299 3 Units  300-349 4 Units  350-399 5 Units  Over 399 6 Units 4/29/20 Fabrizio Noland MD       Current medications:    Current Facility-Administered Medications   Medication Dose Route Frequency Provider Last Rate Last Admin    [START ON 1/13/2022] ceFAZolin (ANCEF) 2000 mg in dextrose 5 % 50 mL IVPB  2,000 mg IntraVENous On Call to [address] [place] [address]        fluvoxaMINE (LUVOX) tablet 100 mg  100 mg Oral Nightly Jennifer Coyle MD   100 mg at 01/11/22 2135    ALPRAZolam (XANAX) tablet 0.75 mg  0.75 mg Oral Q4H PRN Jennifer Coyle MD   0.75 mg at 01/12/22 0838    [Held by provider] heparin (porcine) injection 5,000 Units  5,000 Units SubCUTAneous 3 times per day Jennifer Coyle MD   5,000 Units at 01/12/22 0556    sodium chloride flush 0.9 % injection 10 mL  10 mL IntraVENous PRN Jennifer Coyle MD        dextrose 5 % solution  100 mL/hr IntraVENous PRN Jennifer Coyle MD        dextrose 50 % IV solution  12.5 g IntraVENous PRN Jennifer Coyle MD        glucagon (rDNA) injection 1 mg  1 mg IntraMUSCular PRN Jennifer Coyle MD        glucose (GLUTOSE) 40 % oral gel 15 g  15 g Oral PRN Jennifer Coyle MD        insulin lispro (1 Unit Dial) 0-12 Units  0-12 Units SubCUTAneous TID WC Jennifer Coyle MD   2 Units at 01/12/22 1151    insulin lispro (1 Unit Dial) 0-6 Units  0-6 Units SubCUTAneous Nightly Jennifer Coyle MD   2 Units at 01/11/22 2138    acetaminophen (TYLENOL) tablet 650 mg  650 mg Oral Q4H PRN Jennifer Coyle MD        amLODIPine (NORVASC) tablet 10 mg  10 mg Oral Daily Jennifer Coyle MD   10 mg at 01/12/22 0826    aspirin EC tablet 81 mg  81 mg Oral Daily Jennifer Coyle MD   81 mg at 01/12/22 0825    atorvastatin (LIPITOR) tablet 40 mg  40 mg Oral Nightly Jennifer Coyle MD   40 mg at 01/11/22 2133    benzonatate (TESSALON) capsule 200 mg  200 mg Oral Q8H PRN Jennifer Coyle MD   200 mg at 01/12/22 0838    buPROPion (WELLBUTRIN XL) extended release tablet 150 mg  150 mg Oral QAM Jennifer Coyle MD   150 mg at 01/12/22 0825    cloNIDine (CATAPRES) 0.2 MG/24HR 1 patch  1 patch TransDERmal Weekly Trace Richards MD   1 patch at 01/12/22 1010    furosemide (LASIX) tablet 10 mg  10 mg Oral Daily Trace Richards MD   10 mg at 01/12/22 0825    heparin (porcine) injection 3,600 Units  3,600 Units IntraVENous PRN Trace Richards MD        hydrALAZINE (APRESOLINE) tablet 50 mg  50 mg Oral Q8H PRN Trace Richards MD        hydrOXYzine (ATARAX) tablet 25 mg  25 mg Oral 4x Daily PRN Trace Richards MD        insulin glargine (LANTUS;BASAGLAR) injection pen 20 Units  20 Units SubCUTAneous QAM Trace Richards MD   20 Units at 01/12/22 0832    lisinopril (PRINIVIL;ZESTRIL) tablet 40 mg  40 mg Oral Daily Trace Richards MD   40 mg at 01/12/22 0826    ondansetron (ZOFRAN-ODT) disintegrating tablet 4 mg  4 mg Oral Q8H PRN Trace Richards MD        pregabalin (LYRICA) capsule 75 mg  75 mg Oral Nightly Trace Richards MD   75 mg at 01/11/22 2133    propranolol (INDERAL LA) extended release capsule 80 mg  80 mg Oral Daily Trace Richards MD   80 mg at 01/12/22 1013    spironolactone (ALDACTONE) tablet 50 mg  50 mg Oral Daily Trace Richards MD   50 mg at 01/12/22 0826    tiotropium-olodaterol (STIOLTO) 2.5-2.5 MCG/ACT inhaler 2 puff  2 puff Inhalation Daily Trace Richards MD   2 puff at 01/12/22 0436    traMADol (ULTRAM) tablet 50 mg  50 mg Oral Q6H PRN Trace Richards MD        traZODone (DESYREL) tablet 50 mg  50 mg Oral Nightly PRN Trace Richards MD   50 mg at 01/06/22 2046       Allergies: Allergies   Allergen Reactions    Amoxicillin Hives, Itching and Other (See Comments)     Tolerates cephalosporins  Patient tolerating cefazolin (ANCEF) as of October 11, 2018      Levofloxacin Anaphylaxis    Vancomycin Anaphylaxis, Hives and Shortness Of Breath    Tape [Adhesive Tape] Other (See Comments)     Paper tape turns skin bright red. Plastic tape okay.         Problem List:    Patient Active Problem List   Diagnosis Code    Type 2 diabetes mellitus, with long-term current use of insulin ([facility]) E11.9, Z79.4    Mixed hyperlipidemia E78.2    Migraine headache G43.909    Anemia D64.9    Diabetic foot infection ([facility]) E11.628, L08.9    Pyogenic inflammation of bone ([facility]) M86.9    History of medication noncompliance Z91.14    Osteomyelitis of left foot ([facility]) M86.9    Nephrotic syndrome N04.9    Peripheral edema R60.9    Pulmonary edema J81.1    Right sided numbness R20.0    Tobacco dependence F17.200    Chronic kidney disease (CKD), stage III (moderate) ([facility]) N18.30    Chronic diastolic (congestive) heart failure ([facility]) I50.32    History of CVA (cerebrovascular accident) Z86.73    Arterial ischemic stroke, ICA, left, acute ([facility]) U92.257    HTN (hypertension), benign I10    DM (diabetes mellitus), secondary, uncontrolled, w/neurologic complic ([facility]) W52.15, J92.17    Dyslipidemia E78.5    Smoker F17.200    Panic disorder F41.0    Isolated proteinuria R80.0    Acute on chronic diastolic heart failure ([facility]) I50.33    Diabetic peripheral neuropathy ([facility]) E11.42    Acute respiratory failure ([facility]) J96.00    Depression F32. 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, associated with type 2 diabetes mellitus ([facility]) G32.3745    Cellulitis of right foot L03.115    Hidradenitis suppurativa L73.2    Hypocalcemia E83.51    Non-toxic multinodular goiter E04.2    Polyneuropathy due to type 2 diabetes mellitus ([facility]) E11.42    Proliferative diabetic retinopathy associated with type 2 diabetes mellitus ([facility] U00.2067    ESRD (end stage renal disease) [facility] N18.6    Acute respiratory failure with hypoxemia ([facility]) J96.01    Acute respiratory failure due to COVID-19 ([facility] U07.1, J96.00    Suspected COVID-19 virus infection Z20.822    Epiglottitis J05.10    Recurrent falls R29.6       Past Medical History:        Diagnosis Date    Blind in both eyes     Cerebral artery occlusion with cerebral infarction ([facility]     CHF (congestive heart failure) ([facility]     Chronic kidney disease     Depression     Diabetes mellitus out of control ([address]     Diabetes mellitus, type II ([address]     2005    Diabetic neuropathy associated with type 2 diabetes mellitus ([address]     Generalized headaches     Hypertension     Infertility     Insomnia     chronic vs lack of time spent to sleep    Migraine headache 11/09/2011    Mixed hyperlipidemia     Otitis media     h/o recurrent    Pelvic abscess in female 10/05/2013    Pneumonia     2004 approx.     Stroke ([facility] 08/27/2020    Stroke ([facility] 08/27/2021       Past Surgical History:        Procedure Laterality Date    CERVIX SURGERY      laser tx for dysplasia;1992    EYE SURGERY      FOOT SURGERY Right     FOOT SURGERY Bilateral     FOOT SURGERY Left     IR TUNNELED CATHETER PLACEMENT GREATER THAN 5 YEARS  9/7/2021    IR TUNNELED CATHETER PLACEMENT GREATER THAN 5 YEARS 9/7/2021 John Marroquin MD [facility] PROCEDURES       Social History:    Social History     Tobacco Use    Smoking status: Former Smoker     Packs/day: 1.00     Types: Cigarettes    Smokeless tobacco: Never Used    Tobacco comment: Quit in August 2021   Substance Use Topics    Alcohol use: No     Comment: Very Rare                                Counseling given: Not Answered  Comment: Quit in August 2021      Vital Signs (Current):   Vitals:    01/11/22 1315 01/11/22 2000 01/12/22 0436 01/12/22 0818   BP:  (!) 132/90  125/67   Pulse:  83  81   Resp: 18 18 18 18   Temp:  36.6 °C (97.9 °F)  36.6 °C (97.9 °F)   TempSrc:  Oral  Oral   SpO2: 98% 90% 92% 95%   Weight:       Height:                                                  BP Readings from Last 3 Encounters:   01/12/22 125/67   01/06/22 136/81   12/29/21 100/60       NPO Status:                                                                                 BMI:   Wt Readings from Last 3 Encounters:   01/11/22 180 lb 1.6 oz (81.7 kg)   01/05/22 181 lb 3.5 oz (82.2 kg)   12/29/21 187 lb 14.4 oz (85.2 kg)     Body mass index is 28.21 kg/m².     CBC:   Lab Results   Component Value Date    WBC 12.2 01/12/2022    RBC 3.85 01/12/2022    HGB 9.8 01/12/2022    HCT 31.3 01/12/2022    MCV 81.2 01/12/2022    RDW 16.9 01/12/2022     01/12/2022       CMP:   Lab Results   Component Value Date     01/12/2022    K 4.6 01/12/2022    K 4.5 01/05/2022    CL 99 01/12/2022    CO2 22 01/12/2022    BUN 62 01/12/2022    CREATININE 6.3 01/12/2022    GFRAA 9 01/12/2022    GFRAA >60 11/09/2011    AGRATIO 0.8 01/05/2022    LABGLOM 7 01/12/2022    GLUCOSE 141 01/12/2022    PROT 6.6 01/05/2022    PROT 6.8 11/09/2011    CALCIUM 9.1 01/12/2022    BILITOT 0.4 01/05/2022    ALKPHOS 152 01/05/2022    AST 29 01/05/2022    ALT 15 01/05/2022       POC Tests:   Recent Labs     01/12/22  1110   POCGLU 156*       Coags:   Lab Results   Component Value Date    PROTIME 12.2 01/04/2022    INR 1.08 01/04/2022    APTT 32.0 09/13/2021       HCG (If Applicable): No results found for: PREGTESTUR, PREGSERUM, HCG, HCGQUANT     ABGs:   Lab Results   Component Value Date    PHART 7.309 08/30/2021    PO2ART 92.7 08/30/2021    CAF9EUB 31.9 08/30/2021    MTS6GZT 16.0 08/30/2021    BEART -9.4 08/30/2021    I3ITSBVI 97.7 08/30/2021        Type & Screen (If Applicable):  No results found for: LABABO, LABRH    Drug/Infectious Status (If Applicable):  No results found for: HIV, HEPCAB    COVID-19 Screening (If Applicable):   Lab Results   Component Value Date    COVID19 Not Detected 10/16/2021    COVID19 Not Detected 08/27/2021           Anesthesia Evaluation    Airway: Mallampati: II  TM distance: >3 FB     Mouth opening: > = 3 FB Dental:          Pulmonary:   (+) shortness of breath:                             Cardiovascular:    (+) hypertension:, CHF:, hyperlipidemia               ROS comment: 8/27/21 Echo:  Summary   Left ventricular systolic function is normal with ejection fraction estimated at 55-60 %. No regional wall motion abnormalities are noted. There is mild left ventricular hypertrophy. Normal left ventricular diastolic filling pressure. Moderate mitral regurgitation. Mild pulmonic regurgitation present. IVC size is dilated (>2.1 cm) but collapses > 50% with respiration   consistent with elevated RA pressure (8 mmHg). **There is a small-moderate bilateral pleural effusion. **There is a small circumferential pericardial effusion noted. Neuro/Psych:   (+) CVA (8/2021- right sided weakness):, neuromuscular disease:, headaches: migraine headaches, depression/anxiety             GI/Hepatic/Renal:   (+) renal disease: ESRD and dialysis,           Endo/Other:    (+) DiabetesType II DM, , .                 Abdominal:             Vascular:   + PVD, aortic or cerebral, . ROS comment: Right radial artery occlusion.  Other Findings:             Anesthesia Plan        Alvera Mohs, APRN - CRNA   1/12/2022

## 2022-01-12 NOTE — PLAN OF CARE
Rapid COVID sent and consent for surgery signed and placed in [pts hard chart for tomorrow. Pt aware surgery time is not known at this time. Pt aware transport will be to unit to take her to HD within next 10-15 minutes. Ticket to ride in chart.      Diamond Estrada BSN, RN   [phone]

## 2022-01-12 NOTE — PROGRESS NOTES
Vascular Progress Note    1/12/2022 12:57 PM    Chief complaint / Reason for visit : dialysis access    Subjective:  Patient up in chair. She has no new complaints. Planning for dialysis later today. VSS, afebrile. Vital Signs: /67   Pulse 81   Temp 97.9 °F (36.6 °C) (Oral)   Resp 18   Ht 5' 7\" (1.702 m)   Wt 180 lb 1.6 oz (81.7 kg)   LMP 12/27/2021   SpO2 95%   BMI 28.21 kg/m²  O2 Flow Rate (L/min): 0 L/min   I/O:      Intake/Output Summary (Last 24 hours) at 1/12/2022 1257  Last data filed at 1/12/2022 0830  Gross per 24 hour   Intake 840 ml   Output --   Net 840 ml       Physical Exam:   General: no apparent distress, appears stated age  Chest/Lungs: no accessory muscle use  Vascular: non palpable right radial pulse + doppler  Line: right IJ TDC (+)    Labs:   Lab Results   Component Value Date     01/12/2022    K 4.6 01/12/2022    K 4.5 01/05/2022    CL 99 01/12/2022    CO2 22 01/12/2022    BUN 62 01/12/2022    CREATININE 6.3 01/12/2022    GFRAA 9 01/12/2022    GFRAA >60 11/09/2011    LABGLOM 7 01/12/2022    GLUCOSE 141 01/12/2022    PHOS 6.7 01/12/2022    MG 2.00 12/28/2021    CALCIUM 9.1 01/12/2022     Lab Results   Component Value Date    WBC 12.2 01/12/2022    RBC 3.85 01/12/2022    HGB 9.8 01/12/2022    HCT 31.3 01/12/2022    MCV 81.2 01/12/2022    RDW 16.9 01/12/2022     01/12/2022     Lab Results   Component Value Date    INR 1.08 01/04/2022    PROTIME 12.2 01/04/2022        Imaging:    Vein mapping RUE 1/11/22:  Impressions   Right Impression   The right radial artery appears to be occluded at zone 7.5. It measures 2.7 mm   in diameter. Multiphasic flow is noted in the right radial artery at zone 7 and monophasic   flow at zone 8. The right subclavian artery is patent with normal multiphasic flow. The right cephalic was visualized between zone 2 and 8 and is compressible.    The right basilic was visualized between zone 3 and 8 and is compressible.       Right Cephalic:   Prox UA 2.9 mm   Mid UA 3.5 mm   Dist UA 3.0 mm   Elbow 3.3 mm   Prox FA 2.7 mm   Mid FA 2.5 mm   Wrist 1.8 mm.       Right Basilic:   Mid UA 4.0 mm   Dist UA 3.4 mm   Elbow 3.3 mm   Prox FA 2.1 mm   Mid FA 1.4 mm   Wrist 1.0 mm. Spoke with RN in [place]. Scheduled Meds:    fluvoxaMINE  100 mg Oral Nightly    [Held by provider] heparin (porcine)  5,000 Units SubCUTAneous 3 times per day    insulin lispro  0-12 Units SubCUTAneous TID WC    insulin lispro  0-6 Units SubCUTAneous Nightly    amLODIPine  10 mg Oral Daily    aspirin  81 mg Oral Daily    atorvastatin  40 mg Oral Nightly    buPROPion  150 mg Oral QAM    cloNIDine  1 patch TransDERmal Weekly    furosemide  10 mg Oral Daily    insulin glargine  20 Units SubCUTAneous QAM    lisinopril  40 mg Oral Daily    pregabalin  75 mg Oral Nightly    propranolol  80 mg Oral Daily    spironolactone  50 mg Oral Daily    tiotropium-olodaterol  2 puff Inhalation Daily     Continuous Infusions:    dextrose           Assessment:   ESRD on HD MWF - left handed, currently getting dialysis through right IJ tunneled dialysis catheter  Right radial artery occlusion, incidental finding - + doppler signal on exam, no signs of ischemia   Debility, recurrent falls   DM  H/o CVA - no significant right sided weakness  HTN  HLD    Plan:  Would recommend right brachiocephalic AV fistula possible AV graft. Possibly could do tomorrow if schedule allows. Will check with OR and Dr. Joan Escalante and update patient and staff with timing. D/w patient and agreeable to move forward with surgery. Patient educated on plan of care and disease process. All questions answered. Electronically signed by RIVERA Holley - CNP on 1/12/2022 at 12:57 PM     Vascular Staff    I independently performed an evaluation on Maribel Iglesias. I have reviewed the above documentation completed by Iban Sauceda CNP.   Please see my additional contributions to the HPI, physical exam, assessment, and medical decision making. No acute changes. Vein mapping reviewed and likely candidate for right brachiocephalic fistula  Will tentatively plan for tomorrow pending OR availabiliyt  Will update pt on timing. Surgery discussed        Christiano Whatley M.D., FACS.  1/12/2022  1:01 PM

## 2022-01-12 NOTE — PROGRESS NOTES
Occupational Therapy  Facility/Department: [facility] ACUTE REHAB UNIT  Daily Treatment Note  NAME: Quique Quinteros  : 1975  MRN: [id number]    Date of Service: 2022    Discharge Recommendations:  Home with Home health OT,24 hour supervision or assist  OT Equipment Recommendations  Equipment Needed: No  Other: owns grab bars, shower chair    Assessment   Performance deficits / Impairments: Decreased functional mobility ; Decreased ADL status; Decreased endurance;Decreased balance;Decreased vision/visual deficit; Decreased safe awareness  Assessment: Pt is well below her baseline level of occupational function, based on the above deficits associated with debility and recurrent. Pt has a chronic visual deficit. Pt would benefit from continued skilled acute OT services to address these deficits. Pt is progressing well, demo's increased ability to complete UB tasks and requiring less VCs to attend to activities. Continue POC  Treatment Diagnosis: Decreased ADL status, functional mobility, endurance, balance, and safety awareness associated with ESRD, debility,and recurrent falls  Prognosis: Good  OT Education: OT Role;Plan of Care;ADL Adaptive Strategies;Transfer Training  Patient Education: continue to reinforce for carryover  Barriers to Learning: Visual  REQUIRES OT FOLLOW UP: Yes  Activity Tolerance  Activity Tolerance: Patient Tolerated treatment well;Patient limited by fatigue  Safety Devices  Safety Devices in place: Yes  Type of devices: Left in chair;Call light within reach; Chair alarm in place; All fall risk precautions in place         Patient Diagnosis(es): Debility     has a past medical history of Blind in both eyes, Cerebral artery occlusion with cerebral infarction ([address] CHF (congestive heart failure) ([address] Chronic kidney disease, Depression, Diabetes mellitus out of control ([address] Diabetes mellitus, type II ([address] Diabetic neuropathy associated with type 2 diabetes mellitus ([address] Generalized headaches, Hypertension, Infertility, Insomnia, Migraine headache, Mixed hyperlipidemia, Otitis media, Pelvic abscess in female, Pneumonia, Stroke [facility]), and Stroke ([facility] [address] [facility] has a past surgical history that includes Cervix surgery; eye surgery; Foot surgery (Right); Foot surgery (Bilateral); Foot surgery (Left); and IR TUNNELED CVC PLACE WO SQ PORT/PUMP > 5 YEARS (9/7/2021). Restrictions  Restrictions/Precautions  Restrictions/Precautions: Fall Risk  Required Braces or Orthoses?: No  Position Activity Restriction  Other position/activity restrictions: 51-year-old female with a history of ESRD on HD, diabetes, diabetic neuropathy, legal blindness, CVA with resultant right hemiparesis, HTN, and HLD who was admitted on 1/4 with recurrent falls. Due to increasing difficulty with ambulation and transfers, she had missed 2 dialysis sessions prior to admission. She was recently discharged on 12/29 for an admission with generalized weakness and falls consistent with this admission. She scored well enough with therapy evaluations to discharge to home however it does not appear she is able to care for herself with the assistance of her . She was evaluated by therapy and suggested to continue in an inpatient setting prior to returning home. Patient reports that she had her initial stroke in August and discharged to home with outpatient therapies and subsequently her second stroke in late 2021 resulted in her discharging to [address]. She felt that her therapies were not as effective or intensive as her outpatient therapies. She reports no current complaints.     Subjective   General  Chart Reviewed: Yes  Patient assessed for rehabilitation services?: Yes  Family / Caregiver Present: No  Diagnosis: Debility, Recurrent Falls  Subjective  Subjective: Pt in recliner at entry, agreeable to session  Vital Signs  Patient Currently in Pain: Denies        Objective    ADL  Grooming: Setup;Contact guard assistance (CGA for oral care in stance at sink- face washing and hair brushing completed seated d/t fatigue)  Additional Comments: Pt ambulated to/from bathroom for grooming needs- alternating between sitting/standing- increased time to complete. Pt declined all other needs. Balance  Sitting Balance: Stand by assistance  Standing Balance: Minimal assistance (CGA to Katrin- VCs to correct R lean)  Standing Balance  Time: 7-9min total  Activity: functional mobility, transfers, ADL completion  Comment: R lean with fatigue    Functional Mobility  Functional - Mobility Device: Rolling Walker  Activity: To/from bathroom  Assist Level: Contact guard assistance        Transfers  Sit to stand: Contact guard assistance  Stand to sit: Contact guard assistance                          Cognition  Overall Cognitive Status: Exceptions  Arousal/Alertness: Delayed responses to stimuli  Following Commands: Follows one step commands consistently; Follows one step commands with increased time  Attention Span: Attends with cues to redirect  Memory: Appears intact  Safety Judgement: Decreased awareness of need for assistance  Problem Solving: Assistance required to generate solutions;Assistance required to implement solutions  Insights: Decreased awareness of deficits  Initiation: Requires cues for some  Sequencing: Requires cues for some                       Type of ROM/Therapeutic Exercise  Type of ROM/Therapeutic Exercise: AROM  Comment: chair push-ups for functional strengthening- 9 reps total with rest breaks as needed             Second Session:  Pt seated in recliner at entry, agreeable to session, denied pain and declined ADL needs. Pt donned shoes with set-up/SBA with increased time seated in recliner. Pt ambulated room>dining area with RW at [facility]- increased time.  Dynamic balance activities completed holding small swiss ball- in stance to completed chest press into shoulder flexion x10, alternating steps with chest press (~10reps- CGA to Min progressing to Mod/MaxA with fatigue dt posterior lean- initial pt able to self-correct LOB and lean), rolling ball up/down wall walking hands x4 (increased time, cues to sequence, increased difficulty with motor planning with fatigue). Seated in w/c w/o back rest support- pt completed catch release acorss multiple planes, prior to ball toss pt completed (B) shoulder flexion- 15 reps. Rest breaks taken throughout session as needed. Pt left seated in w/c in gym with PT present for PT session to follow.                  Plan   Plan  Times per week: 75 minutes 5 days per week  Times per day: Daily  Current Treatment Recommendations: Safety Education & Training,Self-Care / Irl Lie Mobility Training,Balance Training         Goals  Short term goals  Time Frame for Short term goals: 2 weeks  Short term goal 1: SBA functional transfers to toilet and shower-- not met, progressing  Short term goal 2: SBA UB ADLs-- not met, progressing  Short term goal 3: min A LB ADLs-- not met, progressing  Short term goal 4: CGA functional mobility for ADLs-- not met, progressing  Patient Goals   Patient goals : to get stronger, stop falling       Therapy Time   Individual Concurrent Group Co-treatment   Time In 1110         Time Out 1140         Minutes 30         Timed Code Treatment Minutes: 30 Minutes     Second Session Therapy Time:   Individual Concurrent Group Co-treatment   Time In 9117        Time Out 1330        Minutes 45          Timed Code Treatment Minutes:  45 + 30    Total Treatment Minutes:  [address] [facility], [facility] [address] [place] [address]

## 2022-01-12 NOTE — PLAN OF CARE
PCR COVID test obtained and sent. Pt aware of 1-3 day window for results. Pt aware her surgery has been cancelled. Call light in reach. Will continue to monitor.       Leonardo PICKERINGN, RN   [phone]

## 2022-01-12 NOTE — PROGRESS NOTES
ACUTE REHAB UNIT  SPEECH/LANGUAGE PATHOLOGY      [x] Daily  [] Weekly Care Conference Note  [] Discharge    Patient:Kristine Schwarz     FUQ:[birth year]  JQN:[id number]  Room #: AYW-4474/9275-27   Rehab Dx/Hx: Recurrent falls [R29.6]  Date of Admit: 1/6/2022      Precautions: falls  Home situation: Lives at home with her ; Not an active ;  completes finances and places pills into pill organizer for pt)   ST Dx: Cognitive Linguistic Deficits     Daily Treatment Info:   Date: 1/12/2022     Tx session 1   Pain None reported    Subjective     Pt up in chair for session, alert and willing to participate. Pt reported maintaining her independence is very important to her. Objective:  Goals    Pt will complete word associative tasks to improve mental flexibility, complex thinking, and working memory skills with 90% accuracy. Word progression/ordering task (mental manipulation) f/3  - completed with 100% (15/15) accuracy independently, repetition required in 2 out of 15 trials    Pt will complete executive function tasks (i.e. planning, deductive reasoning, inferential, functional organization tasks) with 80% accuracy independently. Pt reported that her kitchen is set-up to assist with visual limitations (I.e. each item is in a particular place with labels facing out so they are easily recognizable) and verbalized lay out independently     Developing grocery list utilizing personal organization system:   - pt reported that she goes through pantry, fridge and freezer with  and verbalizes items needed once a week  - this date, pt was able to verbalize typical items on list by category and provide weekly meals independently      Continue to monitor speech sound production with additional goals to be added as warranted. Reduced articulatory precision noted but not consistent at the conversational level.     Pt seen taking large breaths throughout conversation but denied fatigue or reduced breath support        Other areas targeted:    Education:   Provided education regarding rationale for tasks, ways to remain independent despite visual impairment and plan of care. Pt verbalized understanding    Safety Devices: [x] Call light within reach  [x] Chair alarm activated  [] Bed alarm activated  [] Other:     Assessment:   Weekly Update:   Speech Therapy Diagnosis  Cognitive Diagnosis: Pt continues to present with mild cognitive linguistic deficits. High accuracy with naming tasks but continues to complain of effortful word production. Higher level cognitive-linguistic tasks are limited due to visual deficits. Speech Diagnosis: Pt continues to present with slow speech production and hoarse vocal quality at baseline. Will continue to assess pt's speech sound production and treat as appropriate. Current Diet Order: ADULT DIET; Regular; 4 carb choices (60 gm/meal)            Plan:     Frequency:  5days/week   30 minutes/day    Discharge Recommendations:   Barriers: Visual deficits (legally blind)   Discharge Recommendations:  [] Home independently  [x] Home with assistance []  24 hour supervision  [] SNF [] Other:  Continued SLP Treatment:  [] Yes [] No [x] TBD based on progress while on ARU [] Vital Stim indicated [] Other:   Estimated discharge date: 1/21/2022     Session 1 Times:     Individual Concurrent Group Co-treatment   Time In 0940         Time Out 1010         Minutes 30         Time Code Minutes  25        Timed Code Treatment Minutes: 25 Minutes     Total Treatment Minutes: 30    Electronically Signed by     Nicole Perkins M.A., [facility] [id number]  Speech-Language Pathologist  1/12/2022 11:44 AM

## 2022-01-12 NOTE — PLAN OF CARE
Problem: Falls - Risk of:  Goal: Will remain free from falls  Description: Will remain free from falls  Outcome: Ongoing  Goal: Absence of physical injury  Description: Absence of physical injury  Outcome: Ongoing     Problem: IP BOWEL ELIMINATION  Goal: LTG - patient will have regular and routine bowel evacuation  Outcome: Ongoing  Goal: STG - Patient will verbalize signs and symptoms of constipation and how to prevent/alleviate  Outcome: Ongoing     Problem: IP BLADDER/VOIDING  Goal: STG - Patient will state signs and symptoms of UTI  Outcome: Ongoing     Problem: SKIN INTEGRITY  Goal: LTG - Patient will be free from infection  Outcome: Ongoing  Goal: STG - Patient demonstrates preventative skin care measures  Outcome: Ongoing

## 2022-01-12 NOTE — PROGRESS NOTES
Physical Therapy  Facility/Department: [facility] ACUTE REHAB UNIT  Daily Treatment Note  NAME: Quique Quinteros  : 1975  MRN: [id number]    Date of Service: 2022    Discharge Recommendations:  24 hour supervision or assist,Home with Home health PT   PT Equipment Recommendations  Equipment Needed: Yes  Walker: Rolling    Assessment   Body structures, Functions, Activity limitations: Decreased functional mobility ; Decreased posture;Decreased ADL status; Decreased endurance;Decreased ROM; Decreased sensation;Decreased strength;Decreased balance;Decreased safe awareness;Decreased vision/visual deficit  Assessment: pt continues to make improvements in functional mobility. Improved ability and control with stand =>sit with a single LOB posteriorly after a standing task. Lack of hip extension decreases her hip stride length and worsens her posture. Still demonstrating narrow ROMAINE and awareness with amount of support. She would continue to benefit from skilled physical therapy to improve balance and decrease risk of falling. Treatment Diagnosis: impaired balance, impaired gait, generalized weakness  Prognosis: Good  PT Education: Goals;Gait Training;PT Role;Functional Mobility Training;Transfer Training;Plan of Care;General Safety  Barriers to Learning: visual deficits, cognitive deficits  REQUIRES PT FOLLOW UP: Yes  Activity Tolerance  Activity Tolerance: Patient Tolerated treatment well  Activity Tolerance: Tolerated treatment well with increased mobility and attention to task. Required fewer rest breaks than previous sessions.      Patient Diagnosis(es): Recurrent Falls   has a past medical history of Blind in both eyes, Cerebral artery occlusion with cerebral infarction [facility]), CHF (congestive heart failure) ([facility] Chronic kidney disease, Depression, Diabetes mellitus out of control ([facility] [address] [facility] Diabetes mellitus, type II ([facility] [facility] Diabetic neuropathy associated with type 2 diabetes mellitus ([facility] Generalized headaches, Hypertension, Infertility, Insomnia, Migraine headache, Mixed hyperlipidemia, Otitis media, Pelvic abscess in female, Pneumonia, Stroke [facility]), and Stroke ([facility] [address] has a past surgical history that includes Cervix surgery; eye surgery; Foot surgery (Right); Foot surgery (Bilateral); Foot surgery (Left); and IR TUNNELED CVC PLACE WO SQ PORT/PUMP > 5 YEARS (9/7/2021). Restrictions  Restrictions/Precautions  Restrictions/Precautions: Fall Risk  Required Braces or Orthoses?: No  Position Activity Restriction  Other position/activity restrictions: 51-year-old female with a history of ESRD on HD, diabetes, diabetic neuropathy, legal blindness, CVA with resultant right hemiparesis, HTN, and HLD who was admitted on 1/4 with recurrent falls. Due to increasing difficulty with ambulation and transfers, she had missed 2 dialysis sessions prior to admission. She was recently discharged on 12/29 for an admission with generalized weakness and falls consistent with this admission. She scored well enough with therapy evaluations to discharge to home however it does not appear she is able to care for herself with the assistance of her . She was evaluated by therapy and suggested to continue in an inpatient setting prior to returning home. Patient reports that she had her initial stroke in August and discharged to home with outpatient therapies and subsequently her second stroke in late 2021 resulted in her discharging to [address]. She felt that her therapies were not as effective or intensive as her outpatient therapies. She reports no current complaints. Subjective   General  Chart Reviewed: Yes  Additional Pertinent Hx: Hx of CVA x 2 with multiple fulls in home enviornment  Response To Previous Treatment: Patient with no complaints from previous session.   Family / Caregiver Present: No  Referring Practitioner: Dr. Nadeem Cazares  Subjective  Subjective: pt agreebale to PT, reports variable amount of vision day to day. Today she reports being able to see less than yesterday  General Comment  Comments: Upright in recliner eating breakfast on arrival  Pain Screening  Patient Currently in Pain: Denies  Vital Signs  Patient Currently in Pain: Denies    Objective      Transfers  Sit to Stand: Stand by assistance (Pt uses a momentum strategy to complete. Multiple completions within session including from W/C, therapy mat, and recliner.)  Stand to sit: Contact guard assistance (decreased control, VC cues for safety with a single LOB after standing activity)  Stand Pivot Transfers: Contact guard assistance (completed with RW)  Ambulation  Ambulation?: Yes  Ambulation 1  Surface: level tile  Device: Rolling Walker  Assistance: Contact guard assistance (VC+TC to maintain speed with RW)  Quality of Gait: narrow ROMAINE, decreased time in SLS, decreased stride length  Gait Deviations: Slow Kinjal  Distance: 134'+multiple short distances  Comments: Obsticles placed in path to force pt to maneuver around. Pt demonstrated improved scanning visual field and maneuvering around objects in (L) visual field. Decreased hip extenson (B). Decreased kinjal during turning through obstacles. Balance  Posture: Fair  Sitting - Static: Fair;+  Sitting - Dynamic: Poor;+  Standing - Static: Fair;-  Standing - Dynamic: Poor;+  Comments: Balance worsens with decreased UE support demonstrating increased sway. Balance grossly improves with mirror  Other exercises  Other exercises?: Yes  Other exercises 6: Static standing balance at ballet bar with (L) UE support and use of dowel treva in (R) UE 1 x 30 sec Katrin VC/TC for balance and posture. Pt unable to maintain balance without LUE support.    Other exercises 7: FWD alternating stepping with single UE support @ ballet bars 1x10ea; cues for wt shifting onto anterior foot with each step   Other exercises 8: BWD alternating stepping with single UE support @ ballet bars 1x10ea         Comment: Therapist assisted with donning clothes and shoes upon beggining session secondary to time constraints. 2nd PT session: pt seated in W/C in therapy gym with OT upon arrival. Pt reported being fatigued from therapy sessions before beginning. In // bars pt completed alternating lunge steps onto 6\" box 2x 10ea focusing on weight shift, upright posture, and hip extension on trail leg. Pt had difficulty planning weight shifts B and keeping upright posture due to flexion posture. Pt required tactile cue at knee to initiate appropriate forward weight shift. On therapy mat pt completed static tall kneeling balance activity for approx. 45 seconds with increased sway forward, along with 1x10 glute bridges with 2 second hold Katrin. Pt required proprioceptive input at B hips to assist with hip elevation during bridging. Pt returned to bed supine with HOB, call light in reach, bed alarm on, and all needs met. Goals  Short term goals  Time Frame for Short term goals: 2 weeks  Short term goal 1: Pt to complete bed mobility at modified independent level with use of bed rail. Short term goal 2: Pt to complete transfers at [facility]  Short term goal 3: Pt to ambulate 150 ft with RW at [facility]  Short term goal 4: Pt to ascend/descend 4 steps with (B) HR at [facility]  Short term goal 5: Pt to complete car transfer at [facility]  Patient Goals   Patient goals : To reduce falling episodes    Plan    Plan  Times per week: 5x/week, 75 min/day  Times per day: Daily  Current Treatment Recommendations: Strengthening,Balance Training,Functional Mobility Training,Transfer Training,ADL/Self-care Training,Stair training,Endurance Training,Gait Training,Neuromuscular Re-education,Positioning,Modalities,Patient/Caregiver Education & Training,Safety Education & Training  Safety Devices  Type of devices:  All fall risk precautions in place,Call light within reach,Gait belt,Chair alarm in place,Left in chair  Restraints  Initially in place: No     Therapy Time   Individual Concurrent Group Co-treatment   Time In 0830         Time Out 0915         Minutes 45         Timed Code Treatment Minutes: 39 Minutes    Second Session Therapy Time:   Individual Concurrent Group Co-treatment   Time In 7212         Time Out 7528         Minutes 30           Timed Code Treatment Minutes:  80+48    Total Treatment Minutes:  [address], SPT  1st session: Corie Garduno, [id number]  Therapist was present, directed the patient's care, made skilled judgement, and was responsible for assessment and treatment of the patient. 2nd session:  Therapist was present, directed the patient's care, made skilled judgement, and was responsible for assessment and treatment of the patient.  Co-signed and supervised by:  Elvira Morse DPT [id number]

## 2022-01-12 NOTE — PROGRESS NOTES
MD Stephanie Villagran MD Burnis Fanny, MD                Office: [phone]                      Fax: [phone]          Inpatient  Progress Note:     PATIENT NAME: Kang Ontiveros  : 1975  MRN: [id number]         IMPRESSION / RECOMMENDATION:       Admitted for:  Recurrent falls [R29.6]       1. ESRD on iHD: MWF ,  - follows w/ Dr. Milton Roldan- [facility] dialysis center   - access: [facility]   - consult vascular surgery for AV access     - outpt HD center: [facility], Dr Milton Roldan   - recently started HD in 2021, during admission w/ ATN w/ KINZA on CKD-4, was lost for f/u,), had acute hypoxic respiratory failure, pneumonia - needing intubation in past   - missed HD x2  - encouraged compliance as if her solutes are better control, she may feel overall better    - so needed HD on admission during inpatient stay  - now admitted to ARU for inpatient rehab      HD Wednesday-today  More UOP - noted - so monitor     Labs MWF - f/u K   Low K diet  Avoid LARRY blockade     EDW listed 88 (but per pt 85 kg)  81 kg pre-HD So minimal UF needed       2. Hypertension/Volume Status:  - BP HTN - hx of resistant HTN - slightly uncontrolled - f/u after HD + resume home meds   - EDW reported 88  -> 83 kg currently - so might be her new DW  - Na - chronic hypoNatremia - f/u w/ HD       3. Electrolytes/acid-base:  K: WNL  High AG metabolic acidosis: mild - f/u w/ HD      4. Anemia of renal failure: at goal  - RIA: w/ HD     5. BMD:  - Phos: follow iPTH outpt     D/W Pt, Dr Pierce Dodge        : Other supportive care :   - Check daily renal function panel with electrolytes-phosphorus  - Strict monitoring of I/Os, daily weight  - Renal feeds/diet  - Current medications reviewed. - Nephrotoxic medications have been discontinued. - Dose adjusted and appropriate.                             - Dose meds for eGFR <15 mL/min/1.73m2.               - Avoid heavy opioids due to renal failure - may use very low dose dilaudid / fentanyl with close monitoring of CNS and respiratory depression.             Other Problems:  Recurrent falls [R29.6]       Please refer to orders. Multiple complex problems. High risk  Discussed with patient, and treatment team    Thank you for allowing me to participate in this patient's care. Please do not hesitate to contact me with any questions/concerns. We will follow along with you.           Andres Dick MD  [facility] [address] [place]: [phone] or [employer]  Fax: [phone]     Time spent  ~ 35 minutes that included face-to-face meeting/discussion with patient, and treatment team (including primary/referring team and other consultants; included coordination of care with the treatment team; and review of patient's electronic medical records and ordering appropriates tests.          Subjective:  Seen resting in chair  Said back pain during PT/OT       Vitals:    01/12/22 0818   BP: 125/67   Pulse: 81   Resp: 18   Temp: 97.9 °F (36.6 °C)   SpO2: 95%       Awake, co-operative    Neck: Supple. no JVD. Cardiac: S1 and S2+, No pericardial rub. Chest: Normal respiratory effort,  mild Rales.  No rhonchi  Ext :  LE Edema +, no cynosis         Labs Reviewed  by me   Labs   Lab Results   Component Value Date    CREATININE 6.3 (HH) 01/12/2022    BUN 62 (H) 01/12/2022     01/12/2022    K 4.6 01/12/2022    CL 99 01/12/2022    CO2 22 01/12/2022     Lab Results   Component Value Date    WBC 12.2 (H) 01/12/2022    HGB 9.8 (L) 01/12/2022    HCT 31.3 (L) 01/12/2022    MCV 81.2 01/12/2022     01/12/2022       Dialysis Treatment and Prescription reviewed

## 2022-01-12 NOTE — PROGRESS NOTES
VASCULAR    Rapid COVID-19 screen for surgery positive. Surgery cancelled for tomorrow. Will send COVID-19 PCR test to confirm. Isolation precautions ordered. Updated patient on above plan.     Electronically signed by RIVERA Ocampo CNP on 1/12/2022 at 3:47 PM

## 2022-01-12 NOTE — PROGRESS NOTES
Assessment complete see flowsheets. A&Ox4. Meds given per eMAR. Pain managed by repositioning, and medications per [facility]. The care plan and education has been reviewed and mutually agreed upon with the patient. In bed, fall precautions in place, hourly rounding, call light and belongings in reach, bed in lowest position, wheels locked in place, side rails up x 3, walkways free of clutter. No further needs expressed at this time. 07:14 Patient is still having issues staying awake. Patient starts to [facility] off\" when ambulating, or toileting. No further needs expressed.        Electronically signed by [place] RN on 1/12/2022 at 7:15 AM

## 2022-01-13 ENCOUNTER — PREP FOR PROCEDURE (OUTPATIENT)
Dept: VASCULAR SURGERY | Age: 47
End: 2022-01-13

## 2022-01-13 ENCOUNTER — ANESTHESIA (OUTPATIENT)
Dept: OPERATING ROOM | Age: 47
End: 2022-01-13

## 2022-01-13 DIAGNOSIS — Z99.2 ESRD (END STAGE RENAL DISEASE) ON DIALYSIS (HCC): Primary | ICD-10-CM

## 2022-01-13 DIAGNOSIS — N18.6 ESRD (END STAGE RENAL DISEASE) ON DIALYSIS (HCC): Primary | ICD-10-CM

## 2022-01-13 LAB
GLUCOSE BLD-MCNC: 119 MG/DL (ref 70–99)
GLUCOSE BLD-MCNC: 183 MG/DL (ref 70–99)
GLUCOSE BLD-MCNC: 183 MG/DL (ref 70–99)
GLUCOSE BLD-MCNC: 184 MG/DL (ref 70–99)
PERFORMED ON: ABNORMAL
SARS-COV-2: NOT DETECTED

## 2022-01-13 PROCEDURE — 6360000002 HC RX W HCPCS: Performed by: PHYSICAL MEDICINE & REHABILITATION

## 2022-01-13 PROCEDURE — 94640 AIRWAY INHALATION TREATMENT: CPT

## 2022-01-13 PROCEDURE — 97530 THERAPEUTIC ACTIVITIES: CPT

## 2022-01-13 PROCEDURE — APPSS30 APP SPLIT SHARED TIME 16-30 MINUTES: Performed by: NURSE PRACTITIONER

## 2022-01-13 PROCEDURE — 6370000000 HC RX 637 (ALT 250 FOR IP): Performed by: PHYSICAL MEDICINE & REHABILITATION

## 2022-01-13 PROCEDURE — 97129 THER IVNTJ 1ST 15 MIN: CPT

## 2022-01-13 PROCEDURE — 99232 SBSQ HOSP IP/OBS MODERATE 35: CPT | Performed by: SURGERY

## 2022-01-13 PROCEDURE — APPNB30 APP NON BILLABLE TIME 0-30 MINS: Performed by: NURSE PRACTITIONER

## 2022-01-13 PROCEDURE — 94761 N-INVAS EAR/PLS OXIMETRY MLT: CPT

## 2022-01-13 PROCEDURE — 97130 THER IVNTJ EA ADDL 15 MIN: CPT

## 2022-01-13 PROCEDURE — 97116 GAIT TRAINING THERAPY: CPT

## 2022-01-13 PROCEDURE — 97535 SELF CARE MNGMENT TRAINING: CPT

## 2022-01-13 PROCEDURE — 97112 NEUROMUSCULAR REEDUCATION: CPT

## 2022-01-13 PROCEDURE — 1280000000 HC REHAB R&B

## 2022-01-13 RX ORDER — SODIUM CHLORIDE 9 MG/ML
25 INJECTION, SOLUTION INTRAVENOUS PRN
Status: CANCELLED | OUTPATIENT
Start: 2022-01-13

## 2022-01-13 RX ORDER — SODIUM CHLORIDE 0.9 % (FLUSH) 0.9 %
10 SYRINGE (ML) INJECTION PRN
Status: CANCELLED | OUTPATIENT
Start: 2022-01-13

## 2022-01-13 RX ORDER — SODIUM CHLORIDE 0.9 % (FLUSH) 0.9 %
10 SYRINGE (ML) INJECTION EVERY 12 HOURS SCHEDULED
Status: CANCELLED | OUTPATIENT
Start: 2022-01-13

## 2022-01-13 RX ORDER — SODIUM CHLORIDE 9 MG/ML
INJECTION, SOLUTION INTRAVENOUS CONTINUOUS
Status: CANCELLED | OUTPATIENT
Start: 2022-01-13

## 2022-01-13 RX ADMIN — SPIRONOLACTONE 50 MG: 25 TABLET ORAL at 08:15

## 2022-01-13 RX ADMIN — ATORVASTATIN CALCIUM 40 MG: 40 TABLET, FILM COATED ORAL at 22:49

## 2022-01-13 RX ADMIN — PREGABALIN 75 MG: 75 CAPSULE ORAL at 22:49

## 2022-01-13 RX ADMIN — ALPRAZOLAM 0.75 MG: 0.5 TABLET ORAL at 23:09

## 2022-01-13 RX ADMIN — BENZONATATE 200 MG: 100 CAPSULE ORAL at 05:32

## 2022-01-13 RX ADMIN — INSULIN LISPRO 2 UNITS: 100 INJECTION, SOLUTION INTRAVENOUS; SUBCUTANEOUS at 17:47

## 2022-01-13 RX ADMIN — ALPRAZOLAM 0.75 MG: 0.5 TABLET ORAL at 16:21

## 2022-01-13 RX ADMIN — HEPARIN SODIUM 5000 UNITS: 5000 INJECTION INTRAVENOUS; SUBCUTANEOUS at 13:35

## 2022-01-13 RX ADMIN — LISINOPRIL 40 MG: 20 TABLET ORAL at 08:16

## 2022-01-13 RX ADMIN — BENZONATATE 200 MG: 100 CAPSULE ORAL at 16:21

## 2022-01-13 RX ADMIN — BENZONATATE 200 MG: 100 CAPSULE ORAL at 22:55

## 2022-01-13 RX ADMIN — ALPRAZOLAM 0.75 MG: 0.5 TABLET ORAL at 05:32

## 2022-01-13 RX ADMIN — INSULIN LISPRO 2 UNITS: 100 INJECTION, SOLUTION INTRAVENOUS; SUBCUTANEOUS at 12:11

## 2022-01-13 RX ADMIN — PROPRANOLOL HYDROCHLORIDE 80 MG: 80 CAPSULE, EXTENDED RELEASE ORAL at 08:17

## 2022-01-13 RX ADMIN — INSULIN GLARGINE 20 UNITS: 100 INJECTION, SOLUTION SUBCUTANEOUS at 08:20

## 2022-01-13 RX ADMIN — TIOTROPIUM BROMIDE AND OLODATEROL 2 PUFF: 3.124; 2.736 SPRAY, METERED RESPIRATORY (INHALATION) at 05:23

## 2022-01-13 RX ADMIN — HEPARIN SODIUM 5000 UNITS: 5000 INJECTION INTRAVENOUS; SUBCUTANEOUS at 05:32

## 2022-01-13 RX ADMIN — INSULIN LISPRO 1 UNITS: 100 INJECTION, SOLUTION INTRAVENOUS; SUBCUTANEOUS at 22:59

## 2022-01-13 RX ADMIN — ASPIRIN 81 MG: 81 TABLET, COATED ORAL at 08:15

## 2022-01-13 RX ADMIN — BUPROPION HYDROCHLORIDE 150 MG: 150 TABLET, EXTENDED RELEASE ORAL at 08:15

## 2022-01-13 RX ADMIN — HEPARIN SODIUM 5000 UNITS: 5000 INJECTION INTRAVENOUS; SUBCUTANEOUS at 22:49

## 2022-01-13 RX ADMIN — FUROSEMIDE 10 MG: 20 TABLET ORAL at 08:15

## 2022-01-13 RX ADMIN — AMLODIPINE BESYLATE 10 MG: 5 TABLET ORAL at 08:15

## 2022-01-13 RX ADMIN — FLUVOXAMINE MALEATE 100 MG: 50 TABLET, COATED ORAL at 22:52

## 2022-01-13 ASSESSMENT — PAIN DESCRIPTION - ORIENTATION: ORIENTATION: LOWER

## 2022-01-13 ASSESSMENT — PAIN SCALES - GENERAL
PAINLEVEL_OUTOF10: 0
PAINLEVEL_OUTOF10: 0
PAINLEVEL_OUTOF10: 2

## 2022-01-13 ASSESSMENT — PAIN DESCRIPTION - PAIN TYPE: TYPE: ACUTE PAIN

## 2022-01-13 ASSESSMENT — PAIN DESCRIPTION - LOCATION: LOCATION: BACK

## 2022-01-13 ASSESSMENT — PAIN DESCRIPTION - DESCRIPTORS: DESCRIPTORS: CONSTANT;DISCOMFORT

## 2022-01-13 NOTE — PROGRESS NOTES
PM assessment complete. Pt tested positive for Covid today (rapid test, PCR now pending). Lung sounds mostly clear with some expiratory wheezes. VSS. Medications given per MAR. Fall precautions in place, hourly rounding, call light and belongings in reach, bed in lowest position, wheels locked in place, side rails up x 2, walkways free of clutter.

## 2022-01-13 NOTE — PLAN OF CARE
Problem: Falls - Risk of:  Goal: Will remain free from falls  Description: Will remain free from falls  1/12/2022 2346 by Alem Maloney RN  Outcome: Ongoing     Problem: Falls - Risk of:  Goal: Absence of physical injury  Description: Absence of physical injury  1/12/2022 2346 by Alem Maloney RN  Outcome: Ongoing     Problem: IP BOWEL ELIMINATION  Goal: LTG - patient will have regular and routine bowel evacuation  1/12/2022 2346 by Alem Maloney RN  Outcome: Ongoing     Problem: IP BOWEL ELIMINATION  Goal: STG - Patient will verbalize signs and symptoms of constipation and how to prevent/alleviate  1/12/2022 2346 by Alem Maloney RN  Outcome: Ongoing     Problem: IP BLADDER/VOIDING  Goal: STG - Patient will state signs and symptoms of UTI  1/12/2022 2346 by Alem Maloney RN  Outcome: Ongoing     Problem: SKIN INTEGRITY  Goal: LTG - Patient will be free from infection  1/12/2022 2346 by Alem Maloney RN  Outcome: Ongoing     Problem: SKIN INTEGRITY  Goal: STG - Patient demonstrates preventative skin care measures  1/12/2022 2346 by Alem Maloney RN  Outcome: Ongoing     Problem: Airway Clearance - Ineffective  Goal: Achieve or maintain patent airway  Outcome: Ongoing     Problem: Gas Exchange - Impaired  Goal: Absence of hypoxia  Outcome: Ongoing     Problem: Gas Exchange - Impaired  Goal: Promote optimal lung function  Outcome: Ongoing     Problem: Breathing Pattern - Ineffective  Goal: Ability to achieve and maintain a regular respiratory rate  Outcome: Ongoing     Problem: Body Temperature -  Risk of, Imbalanced  Goal: Ability to maintain a body temperature within defined limits  Outcome: Ongoing     Problem: Body Temperature -  Risk of, Imbalanced  Goal: Will regain or maintain usual level of consciousness  Outcome: Ongoing     Problem:  Body Temperature -  Risk of, Imbalanced  Goal: Complications related to the disease process, condition or treatment will be avoided or minimized  Outcome: Ongoing     Problem: Isolation Precautions - Risk of Spread of Infection  Goal: Prevent transmission of infection  Outcome: Ongoing     Problem: Nutrition Deficits  Goal: Optimize nutritional status  Outcome: Ongoing     Problem: Risk for Fluid Volume Deficit  Goal: Maintain normal heart rhythm  Outcome: Ongoing     Problem: Risk for Fluid Volume Deficit  Goal: Maintain absence of muscle cramping  Outcome: Ongoing     Problem: Risk for Fluid Volume Deficit  Goal: Maintain normal serum potassium, sodium, calcium, phosphorus, and pH  Outcome: Ongoing     Problem: Loneliness or Risk for Loneliness  Goal: Demonstrate positive use of time alone when socialization is not possible  Outcome: Ongoing     Problem: Fatigue  Goal: Verbalize increase energy and improved vitality  Outcome: Ongoing     Problem: Patient Education: Go to Patient Education Activity  Goal: Patient/Family Education  Outcome: Ongoing

## 2022-01-13 NOTE — PROGRESS NOTES
Physical Therapy  Facility/Department: [facility] ACUTE REHAB UNIT  Daily Treatment Note  NAME: Donte Raygoza  : 1975  MRN: [id number]    Date of Service: 2022    Discharge Recommendations:  24 hour supervision or assist,Home with Home health PT   PT Equipment Recommendations  Equipment Needed: Yes  Mobility Devices: Josephine : Rolling    Assessment   Body structures, Functions, Activity limitations: Decreased functional mobility ; Decreased posture;Decreased ADL status; Decreased endurance;Decreased sensation;Decreased strength;Decreased balance;Vestibular Impairment;Decreased safe awareness;Decreased ROM  Assessment: pt making steady progress with bed mobility and transfers with ability to complete rolling and supine=> without use of HR SBA. Balance continues to be diffuclt with apprehension moving outisde her ROMAINE and demonstrating poor LE and trunk proprioception. Requires frequent redirection to task secondary to distractability within enviornment. She would continue to benefit from skilled phyiscal therapy to improve her functional mobility and decrease her fall risk. Treatment Diagnosis: impaired balance, impaired gait, generalized weakness  Prognosis: Good  PT Education: PT Role;Transfer Training;Disease Specific Education; Functional Mobility Training;Plan of Care; Injury Prevention;Gait Training  Patient Education: Patient verbalized understanding, would benefit from continued reinforcement  Barriers to Learning: visual deficits, cognitive deficits  REQUIRES PT FOLLOW UP: Yes  Activity Tolerance  Activity Tolerance: Patient limited by cognitive status; Patient limited by fatigue  Activity Tolerance: Pt required sitting breaks between activities d/t to LOB and fatigue.      Patient Diagnosis(es): Recurrent falls   has a past medical history of Blind in both eyes, Cerebral artery occlusion with cerebral infarction [facility]), CHF (congestive heart failure) ([facility] [address] Chronic kidney disease, Depression, Diabetes mellitus out of control ([facility] [address] Diabetes mellitus, type II ([facility] [address] Diabetic neuropathy associated with type 2 diabetes mellitus ([facility] [address] Generalized headaches, Hypertension, Infertility, Insomnia, Migraine headache, Mixed hyperlipidemia, Otitis media, Pelvic abscess in female, Pneumonia, Stroke [facility]), and Stroke ([facility] [address] has a past surgical history that includes Cervix surgery; eye surgery; Foot surgery (Right); Foot surgery (Bilateral); Foot surgery (Left); and IR TUNNELED CVC PLACE WO SQ PORT/PUMP > 5 YEARS (9/7/2021). Restrictions  Restrictions/Precautions  Restrictions/Precautions: Fall Risk  Required Braces or Orthoses?: No  Position Activity Restriction  Other position/activity restrictions: 49-year-old female with a history of ESRD on HD, diabetes, diabetic neuropathy, legal blindness, CVA with resultant right hemiparesis, HTN, and HLD who was admitted on 1/4 with recurrent falls. Due to increasing difficulty with ambulation and transfers, she had missed 2 dialysis sessions prior to admission. She was recently discharged on 12/29 for an admission with generalized weakness and falls consistent with this admission. She scored well enough with therapy evaluations to discharge to home however it does not appear she is able to care for herself with the assistance of her . She was evaluated by therapy and suggested to continue in an inpatient setting prior to returning home. Patient reports that she had her initial stroke in August and discharged to home with outpatient therapies and subsequently her second stroke in late 2021 resulted in her discharging to [address]. She felt that her therapies were not as effective or intensive as her outpatient therapies. She reports no current complaints. Subjective   General  Chart Reviewed:  Yes  Additional Pertinent Hx: Hx of CVA x 2 with multiple falls in home enviornment  Response To Previous Treatment: Patient with no complaints from previous session. Family / Caregiver Present: No  Referring Practitioner: Dr. Kruse Spine  Subjective  Subjective: pt agreebale to PT, reports she is glad her PCR COVID test was negative. Concerned her new medication may make her dizzy  General Comment  Comments: Supine in bed on arrival  Pain Screening  Patient Currently in Pain: Denies  Vital Signs  Patient Currently in Pain: Denies    Objective   Bed mobility  Rolling to Right: Stand by assistance  Supine to Sit: Stand by assistance  Scooting: Contact guard assistance (to EOB. Posterior lean noted in unsupported sitting)  Transfers  Sit to Stand: Contact guard assistance (require multiple attempts to self complete due to posterior LOB during initial stance resulting in sit to EOB)  Stand to sit: Contact guard assistance  Stand Pivot Transfers: Contact guard assistance (progressing to Katrin end of session d/t single LOB)  Comment: Inconsistent velocity and control throughout transfers. Ambulation  Ambulation?: Yes  More Ambulation?: No  Ambulation 1  Surface: level tile  Device: Hand-Held Assist;Lyn rail (HR (L) in hallway with HHA when HR was not present)  Assistance: Moderate assistance (CGA with HR progressing to [address] without use of HR)  Quality of Gait: narrow ROMAINE, decreased time in SLS, decreased stride length  Gait Deviations: Slow Kinjal  Distance: 33' (max distance not attempted) + short distance to recliner  Comments: Long ambualtion with RW defered d/t periods of dizziness reported within session. With use of HR pt had single LOB posteriorly with a protective step (R)  Stairs/Curb  Stairs?: No     Balance  Posture: Fair  Sitting - Static: Fair  Sitting - Dynamic: Fair;-  Standing - Static: Poor;+  Standing - Dynamic: Poor;+  Comments: During dynamic standing balance training, patient noted to have poor attention to balance within task resulting in increased postural deficits and increased assist to stabilize. Apprehensive to move outisde her narrow ROMAINE.  Improves reach,Gait belt,Chair alarm in place,Left in chair  Restraints  Initially in place: No     Therapy Time   Individual Concurrent Group Co-treatment   Time In 0833         Time Out 0907         Minutes 34         Timed Code Treatment Minutes: 34 Minutes     Second Session Therapy Time:   Individual Concurrent Group Co-treatment   Time In 2185         Time Out 1330         Minutes 45           Timed Code Treatment Minutes:  34 + 45    Total Treatment Minutes:  79 minutes    Gay Ferrari SPT  Therapist observed and directed the patient's plan of care.   Co-signed and supervised by: Torres Cruz PT, DPT - [id number]

## 2022-01-13 NOTE — PROGRESS NOTES
ACUTE REHAB UNIT  SPEECH/LANGUAGE PATHOLOGY      [x] Daily  [] Weekly Care Conference Note  [] Discharge    Patient:Kristine Calderon     SNR:3/83/9877  [facility]:[id number]  Room #: VGU-6691/8419-18   Rehab Dx/Hx: Recurrent falls [R29.6]  Date of Admit: 1/6/2022      Precautions: falls  Home situation: Lives at home with her ; Not an active ;  completes finances and places pills into pill organizer for pt)   ST Dx: Cognitive Linguistic Deficits     Daily Treatment Info:   Date: 1/13/2022     Tx session 1   Pain Declined    Subjective     Pt up in chair for session, pleasant and willing to participate. Objective:  Goals    Pt will complete word associative tasks to improve mental flexibility, complex thinking, and working memory skills with 90% accuracy. Divergent naming with given letters/category:  - able to provide appropriate response with 89% (16/18) accuracy independently    Pt will complete executive function tasks (i.e. planning, deductive reasoning, inferential, functional organization tasks) with 80% accuracy independently. Working memory task via repetition of sentences for speech sound production:   - able to repeat simple sentences correctly with 80% (12/15) accuracy independently      Continue to monitor speech sound production with additional goals to be added as warranted. Repetition at the sentence level:   - one episode of reduced articulatory precision noted out of 15 trials   - adequate self-monitoring and correction of error implemented by pt independently     Speech was recorded and played back to the pt (The Lord's Prayer)   - self rated speech at 7/10 from baseline; reported this was due to vocal quality vs presence of speech sound errors (\"slur\") - one error noted during full prayer, pt able to identify and correct        Other areas targeted:    Education:   Provided education regarding rationale for tasks and plan of care.      Pt verbalized understanding    Safety Devices: [x] Call light within reach  [x] Chair alarm activated  [] Bed alarm activated  [] Other:     Assessment:   Weekly Update:   Speech Therapy Diagnosis  Cognitive Diagnosis: Pt continues to present with mild cognitive linguistic deficits. High accuracy with naming tasks but continues to complain of effortful word production. Higher level cognitive-linguistic tasks are limited due to visual deficits. Speech Diagnosis: Pt continues to present with slow speech production and hoarse vocal quality at baseline. Will continue to assess pt's speech sound production and treat as appropriate. Current Diet Order: No diet orders on file            Plan:     Frequency:  5days/week   30 minutes/day    Discharge Recommendations:   Barriers: Visual deficits (legally blind)   Discharge Recommendations:  [] Home independently  [x] Home with assistance []  24 hour supervision  [] SNF [] Other:  Continued SLP Treatment:  [] Yes [] No [x] TBD based on progress while on ARU [] Vital Stim indicated [] Other:   Estimated discharge date: 1/21/2022     Session 1 Times:     Individual Concurrent Group Co-treatment   Time In  0900         Time Out  0930         Minutes  30         Time Code Minutes  24        Timed Code Treatment Minutes:   24    Total Treatment Minutes:  30    Electronically Signed by     Alfredo Kyle M.A., Bruce Pitch [id number]  Speech-Language Pathologist  1/13/2022 7:47 AM

## 2022-01-13 NOTE — PROGRESS NOTES
Occupational Therapy  Facility/Department: [facility] ACUTE REHAB UNIT  Daily Treatment Note  NAME: Phuong Galindo  : 1975  MRN: [id number]    Date of Service: 2022    Discharge Recommendations:   Home with Home health OT,24 hour supervision or assist,S Level 3  OT Equipment Recommendations  Equipment Needed: No  Other: owns grab bars, shower chair    Assessment   Performance deficits / Impairments: Decreased functional mobility ; Decreased ADL status; Decreased endurance;Decreased balance;Decreased vision/visual deficit; Decreased safe awareness  Assessment: Pt is well below her baseline level of occupational function, based on the above deficits associated with debility and recurrent. Pt has a chronic visual deficit. Pt would benefit from continued skilled acute OT services to address these deficits. Pt is progressing well, demo's increased ability to complete UB tasks and requiring less VCs to attend to activities. Continue POC  Treatment Diagnosis: Decreased ADL status, functional mobility, endurance, balance, and safety awareness associated with ESRD, debility,and recurrent falls  Prognosis: Good  Decision Making: Medium Complexity  History: Pt lives w/spouse, previously S level w/ADLs, multiple falls PTA  Exam: As above  OT Education: OT Role;Plan of Care;ADL Adaptive Strategies;Transfer Training;Energy Conservation  Patient Education: Patient verbalized understanding, reinforcement for increased carryover  Barriers to Learning: Visual  REQUIRES OT FOLLOW UP: Yes  Activity Tolerance  Activity Tolerance: Patient Tolerated treatment well  Activity Tolerance: Patient reported balance difficulties this date stating she felt off, patient with 2 LOB and with R lateral and posterior lean in stance. However patient tolerated well. Safety Devices  Safety Devices in place: Yes  Type of devices: Left in chair;Call light within reach; Chair alarm in place; All fall risk precautions in place;Gait belt;Patient at risk for falls;Nurse notified  Restraints  Initially in place: No         Patient Diagnosis(es): There were no encounter diagnoses. has a past medical history of Blind in both eyes, Cerebral artery occlusion with cerebral infarction ([address] CHF (congestive heart failure) ([address] Chronic kidney disease, Depression, Diabetes mellitus out of control ([address] Diabetes mellitus, type II ([address] Diabetic neuropathy associated with type 2 diabetes mellitus ([address] Generalized headaches, Hypertension, Infertility, Insomnia, Migraine headache, Mixed hyperlipidemia, Otitis media, Pelvic abscess in female, Pneumonia, Stroke [facility]), and Stroke ([facility] has a past surgical history that includes Cervix surgery; eye surgery; Foot surgery (Right); Foot surgery (Bilateral); Foot surgery (Left); and IR TUNNELED CVC PLACE WO SQ PORT/PUMP > 5 YEARS (9/7/2021). Restrictions  Restrictions/Precautions  Restrictions/Precautions: Fall Risk  Required Braces or Orthoses?: No  Position Activity Restriction  Other position/activity restrictions: 54-year-old female with a history of ESRD on HD, diabetes, diabetic neuropathy, legal blindness, CVA with resultant right hemiparesis, HTN, and HLD who was admitted on 1/4 with recurrent falls. Due to increasing difficulty with ambulation and transfers, she had missed 2 dialysis sessions prior to admission. She was recently discharged on 12/29 for an admission with generalized weakness and falls consistent with this admission. She scored well enough with therapy evaluations to discharge to home however it does not appear she is able to care for herself with the assistance of her . She was evaluated by therapy and suggested to continue in an inpatient setting prior to returning home. Patient reports that she had her initial stroke in August and discharged to home with outpatient therapies and subsequently her second stroke in late 2021 resulted in her discharging to [address]   She felt that her therapies were not as effective or intensive as her outpatient therapies. She reports no current complaints. Subjective   General  Chart Reviewed: Yes  Patient assessed for rehabilitation services?: Yes  Response to previous treatment: Patient with no complaints from previous session  Family / Caregiver Present: No  Diagnosis: Debility, Recurrent Falls  Subjective  Subjective: Patient seated in recliner upon arrival, agreeable to therapy  Pain Assessment  Pain Assessment: 0-10  Pain Level: 2  Patient's Stated Pain Goal: No pain  Pain Type: Acute pain  Pain Location: Back  Pain Orientation: Lower  Pain Descriptors: Constant; Discomfort  Pre Treatment Pain Screening  Intervention List: Patient able to continue with treatment  Vital Signs  Patient Currently in Pain: Yes     Orientation  Orientation  Overall Orientation Status: Within Functional Limits    Objective    ADL  Grooming: Minimal assistance;Verbal cueing; Increased time to complete;Setup (Karlee for grooming tasks in stance at sink, patient with 1 LOB and Karlee to correct. Patient applied deodorant seated in recliner. Patient Karlee brushed hair and washed face, with Karlee for standing balance.)  UE Dressing: Stand by assistance;Setup; Increased time to complete;Verbal cueing (SBA for donning/doffing sweatshirt, increased time needed to complete due to sequencing.)  LE Dressing: Setup;Verbal cueing; Increased time to complete;Minimal assistance (Karlee for donning/doffing pants, SBA for donning/doffing shoes/socks seated in recliner, increased time needed for redirection and sequencing.)  Additional Comments: Patient ambulated to/from recliner to closet to retrieve clothing. Patient with Karlee for functional mobility with use of RW, 1 LOB with R lateral lean and posterior lean. Patient with increased time needed to complete all functional ADLs due to patient being easily distracted. Patient completed UB/LB ADLs alternating between standing/sitting.   Patient with minimal verbal cueing for redirection and completion of activities. Patient declined any further ADLs. Balance  Sitting Balance: Stand by assistance  Standing Balance: Minimal assistance (Karlee progressing to CGA, patient with R lateral lean and posterior lean)  Standing Balance  Time: ~10-15 minutes  Activity: Functional transfers, functional mobility, ADL completion  Comment: Patient with 2 LOB, with R lateral lean and posterior lean  Functional Mobility  Functional - Mobility Device: Rolling Walker  Activity: To/from bathroom; Other  Assist Level: Minimal assistance  Functional Mobility Comments: Patient ambulated to/from recliner to closet to retrieve change of clothes with Karlee with use of RW for support, patient with 1 LOB with dynamic reaching in standing Karlee to correct. Patient then ambulated to/from bathroom with Karlee and with 1 LOB with turn towards sink. Patient with minimal verbal cueing needed for seqeuncing, walker management for increased safety awareness with functional mobility tasks. Bed mobility  Comment: Patient seated in chair upon arrival, no bed mobility observed this date  Transfers  Sit to stand: Contact guard assistance  Stand to sit: Contact guard assistance  Transfer Comments: Patient with intermittant cues for hand placement for increased safety awareness with functional transfers. Vision  Patient Visual Report: Diplopia; Difficulty maintaining concentration with focus (Patient reported diplopia and concentration difficulties with functional ADL activities, patient reported visual deficits worse when in front of mirror)  Cognition  Overall Cognitive Status: Exceptions  Arousal/Alertness: Inconsistent responses to stimuli;Delayed responses to stimuli  Following Commands: Follows one step commands with increased time; Follows one step commands with repetition  Attention Span: Attends with cues to redirect; Difficulty dividing attention  Memory: Appears intact  Safety Judgement: Decreased awareness of need for assistance;Decreased awareness of need for safety  Problem Solving: Assistance required to generate solutions;Assistance required to implement solutions  Insights: Decreased awareness of deficits  Initiation: Requires cues for some  Sequencing: Requires cues for some     Perception  Overall Perceptual Status: Impaired  Unilateral Attention: Cues to maintain midline in sitting;Cues to maintain midline in standing (R lateral lean and posterior lean)  Initiation: Cues to initiate tasks  Perseveration: Perseverates during ADLs           Second Session:  Pt seated in recliner at entry, finishing lunch, agreeable to session, denied pain and declined ADL needs. Pt ambulated room>dining area with RW at [place] [address], increased time. All sit<>stands and SPT with CGA. Pt completed corn hole activity 2x in stance to address activity tolerance, balance, and safe item retrieval wit use of reacher- no UE support during tossing with up to Katrin for balance correction (alternated tossing with R/L)- pt then ambulated to board with RW to retrieve with use of reacher- Min/Mod VCs for safety and proximity to items prior to picking up- rest breaks taken between reps. Pt requesting to lay in bed at EOS- pt doffed shoes seated EOB with SBA/SUP. Sit>supine at SBA. Pt left in bed, bed alarm on, call light and needs in reach.      Plan   Plan  Times per week: 75 minutes 5 days per week  Times per day: Daily  Current Treatment Recommendations: Safety Education & Training,Self-Care / ADL,Endurance Training,Functional Mobility Training,Balance Training           Goals  Short term goals  Time Frame for Short term goals: 2 weeks  Short term goal 1: SBA functional transfers to toilet and shower-- not met, progressing  Short term goal 2: SBA UB ADLs-- not met, progressing  Short term goal 3: min A LB ADLs-- not met, progressing  Short term goal 4: CGA functional mobility for ADLs-- not met, progressing  Patient Goals Patient goals : to get stronger, stop falling       Therapy Time   Individual Concurrent Group Co-treatment   Time In 1100         Time Out 1149         Minutes 49              Timed Code Treatment Minutes:  49 minutes  Total Treatment Minutes:  49 minutes       Cesar Lucas OTR/L [id number]     Second Session Therapy Time:   Individual Concurrent Group Co-treatment   Time In 7443        Time Out 1450        Minutes 35          Timed Code Treatment Minutes:  49 + 35     Total Treatment Minutes:  [address] Remy Schwartz OTR/L #[id number] (documentation and tx for 2nd session)

## 2022-01-13 NOTE — PLAN OF CARE
Problem: Falls - Risk of:  Goal: Will remain free from falls  Description: Will remain free from falls  1/12/2022 2346 by Aurelia Hyde, RN  Outcome: Ongoing   Pt up to chair, chair alarm on. Call light within a reach. Will continue to monitor.    Problem: SKIN INTEGRITY  Goal: LTG - Patient will be free from infection  1/13/2022 0941 by Dada Soni RN  Outcome: Ongoing

## 2022-01-13 NOTE — PROGRESS NOTES
MD Jemima Krueger MD Kelvin Belfast, MD                Office: [phone]                      Fax: [phone]          Inpatient  Progress Note:     PATIENT NAME: Gorge Bain  : 1975  MRN: [id number]         IMPRESSION / RECOMMENDATION:       Admitted for:  Recurrent falls [R29.6]       1. ESRD on iHD: MWF ,  - follows w/ Dr. Gricelda Abdalla- [facility]   - access: [facility]   - consult vascular surgery for AV access     - outpt HD center: [facility], Dr Gricelda Abdalla   - recently started HD in 2021, during admission w/ ATN w/ KINZA on CKD-4, was lost for f/u,), had acute hypoxic respiratory failure, pneumonia - needing intubation in past   - missed HD x2  - encouraged compliance as if her solutes are better control, she may feel overall better    - so needed HD on admission during inpatient stay  - now admitted to [facility] for inpatient rehab      HD Thursday   More UOP - noted - so monitor     Labs MWF - f/u K   Low K diet  Avoid LARRY blockade     EDW listed 88 (but per pt 85 kg)  81 kg pre-HD So minimal UF needed       2. Hypertension/Volume Status:  - BP HTN - hx of resistant HTN - slightly uncontrolled - f/u after HD + resume home meds   - EDW reported 88  -> 83 kg currently - so might be her new DW  - Na - chronic hypoNatremia - f/u w/ HD       3. Electrolytes/acid-base:  K: WNL  High AG metabolic acidosis: mild - f/u w/ HD      4. Anemia of renal failure: at goal  - RIA: w/ HD     5. BMD:  - Phos: follow iPTH outpt        : Other supportive care :   - Check daily renal function panel with electrolytes-phosphorus  - Strict monitoring of I/Os, daily weight  - Renal feeds/diet  - Current medications reviewed. - Nephrotoxic medications have been discontinued. - Dose adjusted and appropriate.                             - Dose meds for eGFR <15 mL/min/1.73m2.               - Avoid heavy opioids due to renal failure - may use very low dose dilaudid / fentanyl with close monitoring of CNS and respiratory depression.             Other Problems:  Recurrent falls [R29.6]       Please refer to orders. Multiple complex problems. High risk  Discussed with patient, and treatment team    Thank you for allowing me to participate in this patient's care. Please do not hesitate to contact me with any questions/concerns. We will follow along with you.           Mathew Cedeño MD  [facility] [id number] [place] [address] [place]: [phone] or Via [email or web address]  Fax: [phone]     Time spent  ~ 35 minutes that included face-to-face meeting/discussion with patient, and treatment team (including primary/referring team and other consultants; included coordination of care with the treatment team; and review of patient's electronic medical records and ordering appropriates tests.           This patient is COVID-19 , so in a strict isolation, as per current protocol; So in order to preserve PPE supply and to avoid unnecessary exposure to prevent transmission of COVID-19; this patient was NOT examined face to face, as risk of contact outweighs the benefits and likely would not change much of my clinical management. HPI, review of system and physical exam was limited, as it was mainly obtained from chart review and the primary team. But all relevant records and diagnostic tests were reviewed, including laboratory and imaging results. Patient's care is being coordinated with the primary service. Seen via doors-windows-glass      Subjective:  Seen resting   Said back pain during PT/OT   no distress      Vitals:    01/13/22 0812   BP: 128/76   Pulse: 81   Resp: 16   Temp: 98.3 °F (36.8 °C)   SpO2: 92%         Physical exam:    In-person bedside physical examination deferred.   Pursuant to the emergency declaration under the [facility] [address] and Response Supplemental Appropriations Act, this clinical encounter was conducted to provide necessary medical care. (Also consistent with new provisions and guidance offered by [facility] on March 18, 2020 in setting of COVID 19 outbreak and in order to preserve personal protective equipment in accordance with the flexibilities announced by CMS on March 30, 2020)   References: https://[facility]/Portals/0/Resources/COVID-19/3_18%20Telemed%20Guidance%20Updated%20March%2018. pdf?sbs=[email or web address]                      https://[facility]. [facility]/Portals/0/Resources/COVID-19/3_18%20Telemed%20Guidance%20Updated%20March%2018. pdf?hdh=[email or web address]                      [email or web address] pdf    However, I did visually inspect the patient and observed the following: via glass doors-windows    Vitals signs reviewed. Constitutional:       General: no  acute distress. Appearance:  ill-appearing. HENT:      Head: Normocephalic and atraumatic. Nose: Nose normal.      Mouth/Throat:      Mouth: Mucous membranes are moist.   Eyes:      General: No scleral icterus. Conjunctiva/sclera: Conjunctivae normal.   Neck:      Musculoskeletal: Neck supple. Cardiovascular:      Rate and Rhythm: Normal rate. Pulmonary:      Effort: Pulmonary effort is normal.   Abdominal:      General: There is no distension. Musculoskeletal:     lower leg:  edema. Skin:     Coloration: Skin is not jaundiced. Neurological:      General: No focal deficit present.                 Labs Reviewed  by me   Labs   Lab Results   Component Value Date    CREATININE 6.3 (HH) 01/12/2022    BUN 62 (H) 01/12/2022     01/12/2022    K 4.6 01/12/2022    CL 99 01/12/2022    CO2 22 01/12/2022     Lab Results   Component Value Date    WBC 12.2 (H) 01/12/2022    HGB 9.8 (L) 01/12/2022    HCT 31.3 (L) 01/12/2022    MCV 81.2 01/12/2022     01/12/2022       Dialysis Treatment and Prescription reviewed

## 2022-01-13 NOTE — PROGRESS NOTES
Vascular Progress Note    1/13/2022 9:51 AM    Chief complaint / Reason for visit : dialysis access    Subjective:  Patient up in chair. She has no new complaints. She is happy her COVID-19 PCR test was negative. She is continuing to get stronger with therapy. She did have some dizziness yesterday. VSS, afebrile. Vital Signs: /76   Pulse 81   Temp 98.3 °F (36.8 °C) (Oral)   Resp 16   Ht 5' 7\" (1.702 m)   Wt 185 lb 14.4 oz (84.3 kg)   LMP 12/27/2021   SpO2 92%   BMI 29.12 kg/m²  O2 Flow Rate (L/min): 0 L/min   I/O:      Intake/Output Summary (Last 24 hours) at 1/13/2022 0951  Last data filed at 1/12/2022 1938  Gross per 24 hour   Intake 640 ml   Output 1260 ml   Net -620 ml       Physical Exam:   General: no apparent distress, appears stated age  Chest/Lungs: no accessory muscle use  Vascular: non palpable right radial pulse + doppler  Line: right IJ TDC (+)    Labs:   Lab Results   Component Value Date     01/12/2022    K 4.6 01/12/2022    K 4.5 01/05/2022    CL 99 01/12/2022    CO2 22 01/12/2022    BUN 62 01/12/2022    CREATININE 6.3 01/12/2022    GFRAA 9 01/12/2022    GFRAA >60 11/09/2011    LABGLOM 7 01/12/2022    GLUCOSE 141 01/12/2022    PHOS 6.7 01/12/2022    MG 2.00 12/28/2021    CALCIUM 9.1 01/12/2022     Lab Results   Component Value Date    WBC 12.2 01/12/2022    RBC 3.85 01/12/2022    HGB 9.8 01/12/2022    HCT 31.3 01/12/2022    MCV 81.2 01/12/2022    RDW 16.9 01/12/2022     01/12/2022     Lab Results   Component Value Date    INR 1.08 01/04/2022    PROTIME 12.2 01/04/2022        Imaging:    Vein mapping RUE 1/11/22:  Impressions   Right Impression   The right radial artery appears to be occluded at zone 7.5. It measures 2.7 mm   in diameter. Multiphasic flow is noted in the right radial artery at zone 7 and monophasic   flow at zone 8. The right subclavian artery is patent with normal multiphasic flow.    The right cephalic was visualized between zone 2 and 8 and is compressible. The right basilic was visualized between zone 3 and 8 and is compressible.       Right Cephalic:   Prox UA 2.9 mm   Mid UA 3.5 mm   Dist UA 3.0 mm   Elbow 3.3 mm   Prox FA 2.7 mm   Mid FA 2.5 mm   Wrist 1.8 mm.       Right Basilic:   Mid UA 4.0 mm   Dist UA 3.4 mm   Elbow 3.3 mm   Prox FA 2.1 mm   Mid FA 1.4 mm   Wrist 1.0 mm. Spoke with RN in [place]. Scheduled Meds:    [START ON 1/14/2022] insulin glargine  17 Units SubCUTAneous QAM    fluvoxaMINE  100 mg Oral Nightly    heparin (porcine)  5,000 Units SubCUTAneous 3 times per day    insulin lispro  0-12 Units SubCUTAneous TID WC    insulin lispro  0-6 Units SubCUTAneous Nightly    amLODIPine  10 mg Oral Daily    aspirin  81 mg Oral Daily    atorvastatin  40 mg Oral Nightly    buPROPion  150 mg Oral QAM    cloNIDine  1 patch TransDERmal Weekly    furosemide  10 mg Oral Daily    lisinopril  40 mg Oral Daily    pregabalin  75 mg Oral Nightly    propranolol  80 mg Oral Daily    spironolactone  50 mg Oral Daily    tiotropium-olodaterol  2 puff Inhalation Daily     Continuous Infusions:    dextrose           Assessment:   ESRD on HD MWF - left handed, currently getting dialysis through right IJ tunneled dialysis catheter  Right radial artery occlusion, incidental finding - + doppler signal on exam, no signs of ischemia   Debility, recurrent falls   DM  H/o CVA - no significant right sided weakness  HTN  HLD  COVID-19 PCR negative 1/12/22 although rapid was positive     Plan:  Would recommend right brachiocephalic AV fistula possible AV graft. Will look into scheduling and update patient once date and time scheduled. Can be done as OP if discharges prior to procedure. Per report her estimated discharge date is 1/21/22. Patient educated on plan of care and disease process. All questions answered.         Electronically signed by RIVERA Sullivan CNP on 1/13/2022 at 9:51 AM     Addendum:    Surgery scheduled for February 10th at 9 am.      Electronically signed by RIVERA Dorsey CNP on 1/13/2022 at 12:08 PM     Vascular Staff    I independently performed an evaluation on Kelsie Thompson. I have reviewed the above documentation completed by Shantell Amezquita CNP. Please see my additional contributions to the HPI, physical exam, assessment, and medical decision making. No complaints  Will reschedule right BC fistula as outpatient 2/10 and discussed with pt. My office to coordinate  Please contact me with any questions  Will sign off. Thank you again for the consultation      Estephanie Liriano. Michele Davila M.D., FACS.   1/13/2022  2:31 PM

## 2022-01-13 NOTE — PROGRESS NOTES
Janee Alberta  1/13/2022  [id number]    Chief Complaint: Recurrent falls    Subjective:   No overnight events. No current complaints. No hallucinations. Rapid covid test positive but PCR negative. Surgery cancelled yesterday. Plans for tomorrow. Glucose mildly low. ROS: No CP, SOB, dyspnea    Objective:  Patient Vitals for the past 24 hrs:   BP Temp Temp src Pulse Resp SpO2 Weight   01/13/22 0812 128/76 98.3 °F (36.8 °C) Oral 81 16 92 % --   01/13/22 0526 -- -- -- -- 16 94 % --   01/13/22 0503 -- -- -- -- -- -- 185 lb 14.4 oz (84.3 kg)   01/12/22 2030 125/87 98.1 °F (36.7 °C) Oral 80 16 94 % --   01/12/22 1938 139/75 97 °F (36.1 °C) -- 77 16 -- 185 lb 6.5 oz (84.1 kg)   01/12/22 1627 138/75 96.9 °F (36.1 °C) -- 79 16 -- 187 lb 6.3 oz (85 kg)     Gen: No distress, pleasant. Resting in bed  HEENT: Normocephalic, atraumatic. CV: Regular rate and rhythm. No MRG   Resp: No respiratory distress. CTAB   Abd: Soft, nontender nondistended  Ext: No edema. Neuro: Alert, oriented, appropriately interactive. Laboratory data: Available via EMR. Therapy progress:  PT  Position Activity Restriction  Other position/activity restrictions: 15-year-old female with a history of ESRD on HD, diabetes, diabetic neuropathy, legal blindness, CVA with resultant right hemiparesis, HTN, and HLD who was admitted on 1/4 with recurrent falls. Due to increasing difficulty with ambulation and transfers, she had missed 2 dialysis sessions prior to admission. She was recently discharged on 12/29 for an admission with generalized weakness and falls consistent with this admission. She scored well enough with therapy evaluations to discharge to home however it does not appear she is able to care for herself with the assistance of her . She was evaluated by therapy and suggested to continue in an inpatient setting prior to returning home.   Patient reports that she had her initial stroke in August and discharged to home with outpatient therapies and subsequently her second stroke in late 2021 resulted in her discharging to [address]. She felt that her therapies were not as effective or intensive as her outpatient therapies. She reports no current complaints. PT Equipment Recommendations  Equipment Needed: Yes  Mobility Devices: Pham Perches: Rolling  Objective     Bridging: Minimal assistance  Scooting: Contact guard assistance  Sit to Stand: Stand by assistance (Pt uses a momentum strategy to complete. Multiple completions within session including from W/C, therapy mat, and recliner.)  Stand to sit: Contact guard assistance (decreased control, VC cues for safety with a single LOB after standing activity)  Ambulation  Device: Rolling Walker  Assistance: Contact guard assistance (VC+TC to maintain speed with RW)  Distance: 134'+multiple short distances  Assessment   Assessment: pt continues to make improvements in functional mobility. Improved ability and control with stand =>sit with a single LOB posteriorly after a standing task. Lack of hip extension decreases her hip stride length and worsens her posture. Still demonstrating narrow ROMAINE and awareness with amount of support. She would continue to benefit from skilled physical therapy to improve balance and decrease risk of falling. OT  Objective  Feeding: Setup  UE Bathing: Setup,Verbal cueing,Stand by assistance,Increased time to complete  LE Bathing:  Moderate assistance  UE Dressing: Setup,Stand by assistance,Increased time to complete  LE Dressing: Setup,Stand by assistance (to change socks only)  Toileting: Dependent/Total  Toilet - Technique: Ambulating  Equipment Used: Extra wide bedside commode  Toilet Transfer: Minimal assistance     Sit to stand: Contact guard assistance  Stand to sit: Contact guard assistance  Toilet - Technique: Ambulating  Equipment Used: Extra wide bedside commode  Assessment   Assessment: Pt is well below her baseline level of occupational function, based on the above deficits associated with debility and recurrent. Pt has a chronic visual deficit. Pt would benefit from continued skilled acute OT services to address these deficits. Pt is progressing well, demo's increased ability to complete UB tasks and requiring less VCs to attend to activities. Continue POC    SLP  Cognitive Diagnosis: Pt continues to present with mild cognitive linguistic deficits. High accuracy with naming tasks but continues to complain of effortful word production. Higher level cognitive-linguistic tasks are limited due to visual deficits. Speech Diagnosis: Pt continues to present with slow speech production and hoarse vocal quality at baseline. Will continue to assess pt's speech sound production and treat as appropriate. Body mass index is 29.12 kg/m².     Assessment:  Patient Active Problem List   Diagnosis    Type 2 diabetes mellitus, with long-term current use of insulin (HCC)    Mixed hyperlipidemia    Migraine headache    Anemia    Diabetic foot infection ([address]    Pyogenic inflammation of bone ([address]    History of medication noncompliance    Osteomyelitis of left foot (HCC)    Nephrotic syndrome    Peripheral edema    Pulmonary edema    Right sided numbness    Tobacco dependence    Chronic kidney disease (CKD), stage III (moderate) (HCC)    Chronic diastolic (congestive) heart failure (HCC)    History of CVA (cerebrovascular accident)    Arterial ischemic stroke, ICA, left, acute ([address]    HTN (hypertension), benign    DM (diabetes mellitus), secondary, uncontrolled, w/neurologic complic ([address]    Dyslipidemia    Smoker    Panic disorder    Isolated proteinuria    Acute on chronic diastolic heart failure (HCC)    Diabetic peripheral neuropathy (HCC)    Acute respiratory failure (HCC)    Depression    Abnormal gait    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, associated with type 2 diabetes mellitus (HCC)    Cellulitis of right foot    Hidradenitis suppurativa    Hypocalcemia    Non-toxic multinodular goiter    Polyneuropathy due to type 2 diabetes mellitus (HCC)    Proliferative diabetic retinopathy associated with type 2 diabetes mellitus [facility]    ESRD (end stage renal disease) [facility]    Acute respiratory failure with hypoxemia (HCC)    Acute respiratory failure due to COVID-19 ([facility]    Suspected COVID-19 virus infection    Epiglottitis    Recurrent falls       Plan:   Debility: PT/OT     Recurrent falls: PT/OT     ESRD on HD: MWF, Nephro following. AVF creation tomorrow.     DM: lantus 20 -> 17, SSI     DM neuropathy: lyrica 75     H/O CVA: resultant R hemiparesis per report but none significant on exam     HTN: norvasc 10, clonidine 0.2, lisinopril 40, spironolactone 50     HLD: Lipitor 40     Anx/Dep: wellbutrin , xanax PRN, resumed home luvox 100 HS     Bowels: Per protocol  Bladder: Per protocol   Sleep: Trazodone provided prn. Pain: tylenol, tramadol PRN   DVT PPx: Heparin    GILSON: 1/21    Omega Purcell MD 1/13/2022, 9:00 AM    * This document was created using dictation software. While all precautions were taken to ensure accuracy, errors may have occurred. Please disregard any typographical errors.

## 2022-01-13 NOTE — FLOWSHEET NOTE
01/12/22 1627 01/12/22 1938   Treatment   Time On 1633  --    Time Off  --  1935   Vital Signs   /75 139/75   Temp 96.9 °F (36.1 °C) 97 °F (36.1 °C)   Pulse 79 77   Resp 16 16   Weight 187 lb 6.3 oz (85 kg) 185 lb 6.5 oz (84.1 kg)   Weight Method Bed scale Bed scale     Treatment time: 3 hours  Net UF: 860 ml     Pre weight: 85 kg   Post weight: 84.1 kg  EDW: 88 kg     Access used: RTDC  Access function: good with  ml/min     Medications or blood products given: none     Regular outpatient schedule: [facility] MWF     Summary of response to treatment: tolerated well, pt bp soft mid tx, UF to minimum, MD notified. Copy of dialysis treatment record placed in chart, to be scanned into EMR.

## 2022-01-14 LAB
ALBUMIN SERPL-MCNC: 3.3 G/DL (ref 3.4–5)
ANION GAP SERPL CALCULATED.3IONS-SCNC: 13 MMOL/L (ref 3–16)
BUN BLDV-MCNC: 56 MG/DL (ref 7–20)
CALCIUM SERPL-MCNC: 8.8 MG/DL (ref 8.3–10.6)
CHLORIDE BLD-SCNC: 95 MMOL/L (ref 99–110)
CO2: 22 MMOL/L (ref 21–32)
CREAT SERPL-MCNC: 6.1 MG/DL (ref 0.6–1.1)
GFR AFRICAN AMERICAN: 9
GFR NON-AFRICAN AMERICAN: 7
GLUCOSE BLD-MCNC: 119 MG/DL (ref 70–99)
GLUCOSE BLD-MCNC: 123 MG/DL (ref 70–99)
GLUCOSE BLD-MCNC: 151 MG/DL (ref 70–99)
GLUCOSE BLD-MCNC: 153 MG/DL (ref 70–99)
GLUCOSE BLD-MCNC: 159 MG/DL (ref 70–99)
HCT VFR BLD CALC: 30.5 % (ref 36–48)
HEMOGLOBIN: 9.7 G/DL (ref 12–16)
MCH RBC QN AUTO: 25.9 PG (ref 26–34)
MCHC RBC AUTO-ENTMCNC: 31.9 G/DL (ref 31–36)
MCV RBC AUTO: 81.1 FL (ref 80–100)
PDW BLD-RTO: 17.2 % (ref 12.4–15.4)
PERFORMED ON: ABNORMAL
PHOSPHORUS: 6.1 MG/DL (ref 2.5–4.9)
PLATELET # BLD: 240 K/UL (ref 135–450)
PMV BLD AUTO: 8 FL (ref 5–10.5)
POTASSIUM SERPL-SCNC: 5.4 MMOL/L (ref 3.5–5.1)
RBC # BLD: 3.76 M/UL (ref 4–5.2)
SODIUM BLD-SCNC: 130 MMOL/L (ref 136–145)
WBC # BLD: 12.3 K/UL (ref 4–11)

## 2022-01-14 PROCEDURE — 1280000000 HC REHAB R&B

## 2022-01-14 PROCEDURE — 97129 THER IVNTJ 1ST 15 MIN: CPT

## 2022-01-14 PROCEDURE — 97116 GAIT TRAINING THERAPY: CPT

## 2022-01-14 PROCEDURE — 97112 NEUROMUSCULAR REEDUCATION: CPT

## 2022-01-14 PROCEDURE — 6370000000 HC RX 637 (ALT 250 FOR IP): Performed by: PHYSICAL MEDICINE & REHABILITATION

## 2022-01-14 PROCEDURE — 90935 HEMODIALYSIS ONE EVALUATION: CPT

## 2022-01-14 PROCEDURE — 97530 THERAPEUTIC ACTIVITIES: CPT

## 2022-01-14 PROCEDURE — 6360000002 HC RX W HCPCS: Performed by: PHYSICAL MEDICINE & REHABILITATION

## 2022-01-14 PROCEDURE — 80069 RENAL FUNCTION PANEL: CPT

## 2022-01-14 PROCEDURE — 97130 THER IVNTJ EA ADDL 15 MIN: CPT

## 2022-01-14 PROCEDURE — 94760 N-INVAS EAR/PLS OXIMETRY 1: CPT

## 2022-01-14 PROCEDURE — 97535 SELF CARE MNGMENT TRAINING: CPT

## 2022-01-14 PROCEDURE — 94640 AIRWAY INHALATION TREATMENT: CPT

## 2022-01-14 PROCEDURE — 97110 THERAPEUTIC EXERCISES: CPT

## 2022-01-14 PROCEDURE — 85027 COMPLETE CBC AUTOMATED: CPT

## 2022-01-14 RX ORDER — LISINOPRIL 20 MG/1
20 TABLET ORAL DAILY
Status: DISCONTINUED | OUTPATIENT
Start: 2022-01-15 | End: 2022-01-17

## 2022-01-14 RX ADMIN — AMLODIPINE BESYLATE 10 MG: 5 TABLET ORAL at 09:32

## 2022-01-14 RX ADMIN — BENZONATATE 200 MG: 100 CAPSULE ORAL at 20:10

## 2022-01-14 RX ADMIN — INSULIN GLARGINE 17 UNITS: 100 INJECTION, SOLUTION SUBCUTANEOUS at 10:03

## 2022-01-14 RX ADMIN — SPIRONOLACTONE 50 MG: 25 TABLET ORAL at 09:31

## 2022-01-14 RX ADMIN — INSULIN LISPRO 2 UNITS: 100 INJECTION, SOLUTION INTRAVENOUS; SUBCUTANEOUS at 17:20

## 2022-01-14 RX ADMIN — ASPIRIN 81 MG: 81 TABLET, COATED ORAL at 09:32

## 2022-01-14 RX ADMIN — FUROSEMIDE 10 MG: 20 TABLET ORAL at 09:32

## 2022-01-14 RX ADMIN — ALPRAZOLAM 0.75 MG: 0.5 TABLET ORAL at 09:36

## 2022-01-14 RX ADMIN — BUPROPION HYDROCHLORIDE 150 MG: 150 TABLET, EXTENDED RELEASE ORAL at 09:32

## 2022-01-14 RX ADMIN — HEPARIN SODIUM 5000 UNITS: 5000 INJECTION INTRAVENOUS; SUBCUTANEOUS at 22:04

## 2022-01-14 RX ADMIN — TIOTROPIUM BROMIDE AND OLODATEROL 2 PUFF: 3.124; 2.736 SPRAY, METERED RESPIRATORY (INHALATION) at 05:20

## 2022-01-14 RX ADMIN — LISINOPRIL 40 MG: 20 TABLET ORAL at 09:32

## 2022-01-14 RX ADMIN — HEPARIN SODIUM 5000 UNITS: 5000 INJECTION INTRAVENOUS; SUBCUTANEOUS at 17:19

## 2022-01-14 RX ADMIN — TRAMADOL HYDROCHLORIDE 50 MG: 50 TABLET, FILM COATED ORAL at 22:04

## 2022-01-14 RX ADMIN — INSULIN LISPRO 2 UNITS: 100 INJECTION, SOLUTION INTRAVENOUS; SUBCUTANEOUS at 12:21

## 2022-01-14 RX ADMIN — FLUVOXAMINE MALEATE 100 MG: 50 TABLET, COATED ORAL at 22:06

## 2022-01-14 RX ADMIN — BENZONATATE 200 MG: 100 CAPSULE ORAL at 09:36

## 2022-01-14 RX ADMIN — PROPRANOLOL HYDROCHLORIDE 80 MG: 80 CAPSULE, EXTENDED RELEASE ORAL at 09:32

## 2022-01-14 RX ADMIN — PREGABALIN 75 MG: 75 CAPSULE ORAL at 22:04

## 2022-01-14 RX ADMIN — INSULIN LISPRO 1 UNITS: 100 INJECTION, SOLUTION INTRAVENOUS; SUBCUTANEOUS at 22:10

## 2022-01-14 RX ADMIN — ALPRAZOLAM 0.75 MG: 0.5 TABLET ORAL at 20:10

## 2022-01-14 RX ADMIN — HEPARIN SODIUM 5000 UNITS: 5000 INJECTION INTRAVENOUS; SUBCUTANEOUS at 06:08

## 2022-01-14 RX ADMIN — ATORVASTATIN CALCIUM 40 MG: 40 TABLET, FILM COATED ORAL at 22:04

## 2022-01-14 ASSESSMENT — PAIN SCALES - GENERAL
PAINLEVEL_OUTOF10: 2
PAINLEVEL_OUTOF10: 3
PAINLEVEL_OUTOF10: 2
PAINLEVEL_OUTOF10: 3

## 2022-01-14 ASSESSMENT — PAIN DESCRIPTION - LOCATION
LOCATION: BACK
LOCATION: BACK

## 2022-01-14 ASSESSMENT — PAIN DESCRIPTION - ORIENTATION
ORIENTATION: LOWER
ORIENTATION: LOWER

## 2022-01-14 ASSESSMENT — PAIN DESCRIPTION - PAIN TYPE
TYPE: ACUTE PAIN
TYPE: ACUTE PAIN

## 2022-01-14 NOTE — PROGRESS NOTES
PM assessment completed. Vital signs stable. Medications administered per MAR. Patient verbalizes that \" I am having some anxiety. I need to stay on top of my Xanax. \"  Patient could not verbaize a specific source or cause of her anxiety this evening. Medicated with her prescribe xanax per MAR. Reviewed safety measures with patient, verbalizes understanding. Bed in low position, side rails up x 3, alarms activated and call bell within reach.

## 2022-01-14 NOTE — PROGRESS NOTES
topics covered in group   [] caregiver was present, appropriate and engaged in coversation     Name/relationship:    Materials Provided:  \"Guide to Stroke Recovery\" [facility]        Education:   See above     Pt verbalized understanding    Safety Devices: [x] Call light within reach  [x] Chair alarm activated  [] Bed alarm activated  [] Other:     Assessment:   Weekly Update:   Speech Therapy Diagnosis  Cognitive Diagnosis: Pt continues to present with mild cognitive linguistic deficits. High accuracy with naming tasks but continues to complain of effortful word production. Higher level cognitive-linguistic tasks are limited due to visual deficits. Speech Diagnosis: Pt continues to present with slow speech production and hoarse vocal quality at baseline. Will continue to assess pt's speech sound production and treat as appropriate. Current Diet Order: ADULT DIET; Regular; 4 carb choices (60 gm/meal); Low Potassium (Less than 3000 mg/day)            Plan:     Frequency:  5days/week   30 minutes/day    Discharge Recommendations:   Barriers: Visual deficits (legally blind)   Discharge Recommendations:  [] Home independently  [x] Home with assistance []  24 hour supervision  [] SNF [] Other:  Continued SLP Treatment:  [] Yes [] No [x] TBD based on progress while on ARU [] Vital Stim indicated [] Other:   Estimated discharge date: 1/21/2022     Session 1 Times: Individual Concurrent Group Co-treatment   Time In  1000         Time Out  1030         Minutes  30         Time Code Minutes  30        Timed Code Treatment Minutes:   30    Total Treatment Minutes:  30    Electronically Signed by     Paris Dugan M.A. CCC-SLP MARJAN [id number]  Speech-Language Pathologist   1/14/2022 1:59 PM

## 2022-01-14 NOTE — PLAN OF CARE
Problem: Falls - Risk of:  Goal: Will remain free from falls  Description: Will remain free from falls  Outcome: Ongoing     Problem: SKIN INTEGRITY  Goal: LTG - Patient will be free from infection  Outcome: Ongoing     Problem: Pain:  Goal: Pain level will decrease  Description: Pain level will decrease  Outcome: Ongoing     Problem: Fatigue  Goal: Verbalize increase energy and improved vitality  Outcome: Ongoing

## 2022-01-14 NOTE — PROGRESS NOTES
Nancy Magallanes  1/14/2022  [id number]    Chief Complaint: Recurrent falls    Subjective:   No overnight events. No current complaints. No hallucinations. Surgery scheduled for 2/10 for AVF. Labs reviewed. Mild leukocytosis remains. Recent CXR and blood cultures negative. Patient without F/C.    ROS: No CP, SOB, dyspnea    Objective:  Patient Vitals for the past 24 hrs:   BP Temp Temp src Pulse Resp SpO2   01/14/22 0531 -- -- -- -- -- 92 %   01/13/22 2245 126/79 98.2 °F (36.8 °C) Oral 77 16 94 %     Gen: No distress, pleasant. Resting in bed  HEENT: Normocephalic, atraumatic. CV: Regular rate and rhythm. No MRG   Resp: No respiratory distress. CTAB   Abd: Soft, nontender nondistended  Ext: No edema. Neuro: Alert, oriented, appropriately interactive. Laboratory data: Available via EMR. Therapy progress:  PT  Position Activity Restriction  Other position/activity restrictions: 51-year-old female with a history of ESRD on HD, diabetes, diabetic neuropathy, legal blindness, CVA with resultant right hemiparesis, HTN, and HLD who was admitted on 1/4 with recurrent falls. Due to increasing difficulty with ambulation and transfers, she had missed 2 dialysis sessions prior to admission. She was recently discharged on 12/29 for an admission with generalized weakness and falls consistent with this admission. She scored well enough with therapy evaluations to discharge to home however it does not appear she is able to care for herself with the assistance of her . She was evaluated by therapy and suggested to continue in an inpatient setting prior to returning home. Patient reports that she had her initial stroke in August and discharged to home with outpatient therapies and subsequently her second stroke in late 2021 resulted in her discharging to [address]. She felt that her therapies were not as effective or intensive as her outpatient therapies. She reports no current complaints.   PT Equipment Recommendations  Equipment Needed: Yes  Mobility Devices: Marlene [place]: Rolling  Objective     Bridging: Minimal assistance  Scooting: Contact guard assistance (to EOB. Posterior lean noted in unsupported sitting)  Sit to Stand: Contact guard assistance (require multiple attempts to self complete due to posterior LOB during initial stance resulting in sit to EOB)  Stand to sit: Contact guard assistance  Ambulation  Device: Hand-Held Assist,Lyn rail (HR (L) in hallway with HHA when HR was not present)  Assistance: Moderate assistance (CGA with HR progressing to [address] without use of HR)  Distance: 33' (max distance not attempted) + short distance to recliner  Assessment   Assessment: pt making steady progress with bed mobility and transfers with ability to complete rolling and supine=> without use of HR SBA. Balance continues to be diffuclt with apprehension moving outisde her ROMAINE and demonstrating poor LE and trunk proprioception. Requires frequent redirection to task secondary to distractability within enviornment. She would continue to benefit from skilled phyiscal therapy to improve her functional mobility and decrease her fall risk. OT  Objective  Feeding: Setup  UE Bathing: Setup,Verbal cueing,Stand by assistance,Increased time to complete  LE Bathing:  Moderate assistance  UE Dressing: Stand by assistance,Setup,Increased time to complete,Verbal cueing (SBA for donning/doffing sweatshirt, increased time needed to complete due to sequencing.)  LE Dressing: Setup,Verbal cueing,Increased time to complete,Minimal assistance (Karlee for donning/doffing pants, SBA for donning/doffing shoes/socks seated in recliner, increased time needed for redirection and sequencing.)  Toileting: Dependent/Total  Toilet - Technique: Ambulating  Equipment Used: Extra wide bedside commode  Toilet Transfer: Minimal assistance     Sit to stand: Contact guard assistance  Stand to sit: Contact guard assistance  Toilet - Technique: Ambulating  Equipment Used: Extra wide bedside commode  Assessment   Assessment: Pt is well below her baseline level of occupational function, based on the above deficits associated with debility and recurrent. Pt has a chronic visual deficit. Pt would benefit from continued skilled acute OT services to address these deficits. Pt is progressing well, demo's increased ability to complete UB tasks and requiring less VCs to attend to activities. Continue POC    SLP  Cognitive Diagnosis: Pt continues to present with mild cognitive linguistic deficits. High accuracy with naming tasks but continues to complain of effortful word production. Higher level cognitive-linguistic tasks are limited due to visual deficits. Speech Diagnosis: Pt continues to present with slow speech production and hoarse vocal quality at baseline. Will continue to assess pt's speech sound production and treat as appropriate. Body mass index is 29.12 kg/m².     Assessment:  Patient Active Problem List   Diagnosis    Type 2 diabetes mellitus, with long-term current use of insulin (HCC)    Mixed hyperlipidemia    Migraine headache    Anemia    Diabetic foot infection ([address]    Pyogenic inflammation of bone ([address]    History of medication noncompliance    Osteomyelitis of left foot (HCC)    Nephrotic syndrome    Peripheral edema    Pulmonary edema    Right sided numbness    Tobacco dependence    Chronic kidney disease (CKD), stage III (moderate) (HCC)    [facility] diastolic [facility]congestive) heart failure [facility]    History of CVA (cerebrovascular accident)    Arterial ischemic stroke, ICA, left, acute ([address]    HTN (hypertension), benign    DM (diabetes mellitus), secondary, uncontrolled, w/neurologic complic ([address]    Dyslipidemia    Smoker    Panic disorder    Isolated proteinuria    Acute on chronic diastolic heart failure ([address]    Diabetic peripheral neuropathy ([address]    Acute respiratory failure ([address]    Depression    Abnormal gait    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, associated with type 2 diabetes mellitus (HCC)    Cellulitis of right foot    Hidradenitis suppurativa    Hypocalcemia    Non-toxic multinodular goiter    Polyneuropathy due to type 2 diabetes mellitus (HCC)    Proliferative diabetic retinopathy associated with type 2 diabetes mellitus ([address]    ESRD (end stage renal disease) [facility]    Acute respiratory failure with hypoxemia ([facility]    Acute respiratory failure due to COVID-19 ([facility]    Suspected COVID-19 virus infection    Epiglottitis    Recurrent falls       Plan:   Debility: PT/OT     Recurrent falls: PT/OT     ESRD on HD: MWF, Nephro following. AVF creation 2/10.      DM: lantus decreased to 17, SSI     DM neuropathy: lyrica 75     H/O CVA: resultant R hemiparesis per report but none significant on exam     HTN: norvasc 10, clonidine 0.2, lisinopril 40, spironolactone 50     HLD: Lipitor 40     Anx/Dep: wellbutrin , xanax PRN, resumed home luvox 100 HS    Leukocytosis: Mild, AF, - repeat UA. CXR recently unremarkable. Hyperkalemia: per HD.     Bowels: Per protocol  Bladder: Per protocol   Sleep: Trazodone provided prn. Pain: tylenol, tramadol PRN   DVT PPx: Heparin    GILSON: 1/21    Mayi Christianson MD 1/14/2022, 8:30 AM    * This document was created using dictation software. While all precautions were taken to ensure accuracy, errors may have occurred. Please disregard any typographical errors.

## 2022-01-14 NOTE — PROGRESS NOTES
MD Dolores Simpson MD Benedict Regal, MD                Office: [phone]                      Fax: [phone]          Inpatient  Progress Note:     PATIENT NAME: Faisal Johnson  : 1975  MRN: [id number]         IMPRESSION / RECOMMENDATION:       Admitted for:  Recurrent falls [R29.6]       1. ESRD on iHD: MWF ,  - follows w/ Dr. She Fishman- [facility] dialysis center   - access: [facility]   - consult vascular surgery for AV access     - outpt HD center: [facility], Dr She Fishman   - recently started HD in 2021, during admission w/ ATN w/ KINZA on CKD-4, was lost for f/u,), had acute hypoxic respiratory failure, pneumonia - needing intubation in past   - missed HD x2  - encouraged compliance as if her solutes are better control, she may feel overall better    - so needed HD on admission during inpatient stay  - now admitted to ARU for inpatient rehab      HD Friday   More UOP - noted - so monitor     Labs MWF - f/u K   Add Low K diet  Decrease Lisinopril 40 -> 20 mg QD      EDW listed 88 (but per pt 85 kg)  81 kg pre-HD So minimal UF needed       2. Hypertension/Volume Status:  - BP HTN - hx of resistant HTN - slightly uncontrolled - f/u after HD + resume home meds   - EDW reported 88  -> 83 kg currently - so might be her new DW  - Na - chronic hypoNatremia - f/u w/ HD       3. Electrolytes/acid-base:  K: WNL  High AG metabolic acidosis: mild - f/u w/ HD      4. Anemia of renal failure: at goal  - RIA: w/ HD     5. BMD:  - Phos: follow iPTH outpt        : Other supportive care :   - Check daily renal function panel with electrolytes-phosphorus  - Strict monitoring of I/Os, daily weight  - Renal feeds/diet  - Current medications reviewed. - Nephrotoxic medications have been discontinued. - Dose adjusted and appropriate.                             - Dose meds for eGFR <15 mL/min/1.73m2.               - Avoid heavy opioids due to renal failure - may use very low dose dilaudid / fentanyl with close monitoring of CNS and respiratory depression.             Other Problems:  Recurrent falls [R29.6]       Please refer to orders. Multiple complex problems. High risk  Discussed with patient, and treatment team    Thank you for allowing me to participate in this patient's care. Please do not hesitate to contact me with any questions/concerns. We will follow along with you.           Aida Lin MD  [facility] [address] [place] [address] [place]: [phone] or [address] [employer]  Fax: [phone]     Time spent  ~ 35 minutes that included face-to-face meeting/discussion with patient, and treatment team (including primary/referring team and other consultants; included coordination of care with the treatment team; and review of patient's electronic medical records and ordering appropriates tests.           This patient is COVID-19 , so in a strict isolation, as per current protocol; So in order to preserve PPE supply and to avoid unnecessary exposure to prevent transmission of COVID-19; this patient was NOT examined face to face, as risk of contact outweighs the benefits and likely would not change much of my clinical management. HPI, review of system and physical exam was limited, as it was mainly obtained from chart review and the primary team. But all relevant records and diagnostic tests were reviewed, including laboratory and imaging results. Patient's care is being coordinated with the primary service. Subjective:  Seen via doors-windows-glass   no distress      Vitals:    01/14/22 0930   BP: 111/74   Pulse: 75   Resp: 16   Temp: 98 °F (36.7 °C)   SpO2: 98%         Physical exam:    In-person bedside physical examination deferred.   Pursuant to the emergency declaration under the [facility], [address] [place] [address] [facility] authority and the [facility] and [employer], this clinical encounter was conducted to provide necessary medical care. (Also consistent with new provisions and guidance offered by [facility] on March 18, 2020 in setting of COVID 19 outbreak and in order to preserve personal protective equipment in accordance with the flexibilities announced by CMS on March 30, 2020)   References: https://[email or web address]. [facility]/Portals/0/Resources/COVID-19/3_18%20Telemed%20Guidance%20Updated%20March%2018. pdf?bsl=[email or web address]                      https://[email or web address]. [facility]/Portals/0/Resources/COVID-19/3_18%20Telemed%20Guidance%20Updated%20March%2018. pdf?nfw=[email or web address]                      [email or web address] pdf    However, I did visually inspect the patient and observed the following: via glass doors-windows    Vitals signs reviewed. Constitutional:       General: no  acute distress. Appearance:  ill-appearing. HENT:      Head: Normocephalic and atraumatic. Nose: Nose normal.      Mouth/Throat:      Mouth: Mucous membranes are moist.   Eyes:      General: No scleral icterus. Conjunctiva/sclera: Conjunctivae normal.   Neck:      Musculoskeletal: Neck supple. Cardiovascular:      Rate and Rhythm: Normal rate. Pulmonary:      Effort: Pulmonary effort is normal.   Abdominal:      General: There is no distension. Musculoskeletal:     lower leg:  edema. Skin:     Coloration: Skin is not jaundiced. Neurological:      General: No focal deficit present.                 Labs Reviewed  by me   Labs   Lab Results   Component Value Date    CREATININE 6.1 (HH) 01/14/2022    BUN 56 (H) 01/14/2022     (L) 01/14/2022    K 5.4 (H) 01/14/2022    CL 95 (L) 01/14/2022    CO2 22 01/14/2022     Lab Results   Component Value Date    WBC 12.3 (H) 01/14/2022    HGB 9.7 (L) 01/14/2022    HCT 30.5 (L) 01/14/2022    MCV 81.1 01/14/2022     01/14/2022       Dialysis Treatment and Prescription reviewed

## 2022-01-14 NOTE — PROGRESS NOTES
Physical Therapy  Facility/Department: [facility] ACUTE REHAB UNIT  Daily Treatment Note  NAME: Pallavi eBach  : 1975  MRN: [id number]    Date of Service: 2022    Discharge Recommendations:  24 hour supervision or assist,Home with Home health PT   PT Equipment Recommendations  Equipment Needed: Yes  Walker: Rolling    Assessment   Body structures, Functions, Activity limitations: Decreased functional mobility ; Decreased posture;Decreased ADL status; Decreased endurance;Decreased sensation;Decreased strength;Decreased balance;Vestibular Impairment;Decreased safe awareness;Decreased ROM  Assessment: Pt demonstrated improved ambulation distance and velocity with better mechanics. Improved ability to reach outside her ROMAINE during dynamic reaching tasks. Still has difficulty turning and maneuvering RW around objects and making tight turns. She feels discouraged about safety alarms and feel s confident she would not fall if left alone. Therapist encuraged staing improved condition and need for safety measures during hosiptal stay. She would continue to benefit from ongoing physical therapy to reduce risk of falls and increase functional independence. Treatment Diagnosis: impaired balance, impaired gait, generalized weakness  Prognosis: Good  PT Education: PT Role;Transfer Training;Disease Specific Education; Functional Mobility Training;Plan of Care; Injury Prevention;Gait Training;Goals; General Safety  Patient Education: Patient verbalized understanding, would benefit from continued reinforcement  Barriers to Learning: visual deficits, cognitive deficits  REQUIRES PT FOLLOW UP: Yes  Activity Tolerance  Activity Tolerance: Patient limited by cognitive status; Patient limited by fatigue  Activity Tolerance: Pt required sitting breaks between activities d/t to fatigue and weakness.      Patient Diagnosis(es): Recurrent Falls     has a past medical history of Blind in both eyes, Cerebral artery occlusion with cerebral infarction [facility]), [facility]) [facility] Chronic kidney disease, Depression, Diabetes mellitus out of control ([facility] Diabetes mellitus, type II ([facility] Diabetic neuropathy associated with type 2 diabetes mellitus ([facility] Generalized headaches, Hypertension, Infertility, Insomnia, Migraine headache, Mixed hyperlipidemia, Otitis media, Pelvic abscess in female, Pneumonia, Stroke [facility]), and Stroke ([facility] has a past surgical history that includes Cervix surgery; eye surgery; Foot surgery (Right); Foot surgery (Bilateral); Foot surgery (Left); and IR TUNNELED CVC PLACE WO SQ PORT/PUMP > 5 YEARS (9/7/2021). Restrictions  Restrictions/Precautions  Restrictions/Precautions: Fall Risk  Required Braces or Orthoses?: No  Position Activity Restriction  Other position/activity restrictions: 51-year-old female with a history of ESRD on HD, diabetes, diabetic neuropathy, legal blindness, CVA with resultant right hemiparesis, HTN, and HLD who was admitted on 1/4 with recurrent falls. Due to increasing difficulty with ambulation and transfers, she had missed 2 dialysis sessions prior to admission. She was recently discharged on 12/29 for an admission with generalized weakness and falls consistent with this admission. She scored well enough with therapy evaluations to discharge to home however it does not appear she is able to care for herself with the assistance of her . She was evaluated by therapy and suggested to continue in an inpatient setting prior to returning home. Patient reports that she had her initial stroke in August and discharged to home with outpatient therapies and subsequently her second stroke in late 2021 resulted in her discharging to [address]. She felt that her therapies were not as effective or intensive as her outpatient therapies. She reports no current complaints. Subjective   General  Chart Reviewed:  Yes  Additional Pertinent Hx: Hx of CVA x 2 with multiple falls in home enviornment  Response To Previous Treatment: Patient with no complaints from previous session. Family / Caregiver Present: No  Referring Practitioner: Dr. Perry Sessions  Subjective  Subjective: Pt agreebale to PT, reports she is displeased with the inability to navigate around room without assistance and safety measures. General Comment  Comments: Handoff from OT, seated in W/C. Educated on patients ongoing fall risk and use of staff to reduce risk of injury. Pain Screening  Patient Currently in Pain: Denies  Vital Signs  Patient Currently in Pain: Denies    Objective   Transfers  Sit to Stand: Contact guard assistance (require multiple attempts to self complete due to posterior LOB during initial stance resulting in sit to EOB)  Stand to sit: Contact guard assistance  Comment: Inconsistent velocity and control throughout transfers. Ambulation  Ambulation?: Yes  More Ambulation?: No  Ambulation 1  Surface: level tile  Device: Rolling Walker  Assistance: Contact guard assistance  Quality of Gait: narrow ROMAINE, decreased time in SLS, decreased stride length  Distance: 272'  Comments: Improved pace and stride length without requiring walker stabilizaion from PT. Occasional LOB d/t loss of focus and narrow ROMAINE however able to self correct without physical correction. Stairs/Curb  Stairs?: Yes  Stairs  # Steps : 9  Stairs Height: 6\"  Rails: Bilateral  Assistance: Minimal assistance (CGA progressing to Katrin d/t fatigue and weakness)  Comment: step through pattern with hesitation and misstepping present. Poor eccentric control descending stairs. Required TC to facilitate weight shift and balance. Balance  Posture: Fair  Sitting - Static: Fair  Sitting - Dynamic: Fair;-  Standing - Static: Poor;+ (Fair (-) to Poor (+) d/t fatigue and weakness)  Standing - Dynamic: Poor;+  Comments: Improved protective reactions during balance training and ambulation.  Required seated rest breaks between balance training activities  Other exercises  Other exercises 1: Lateral Walking at ballet bars 10 ft x 2ea with single UE support. CGA--Katrin for balance. VC for upright posture and UE/LE positioning. Other exercises 2: Dynamic reaching + lateral stepping tasks with single UE support @ ballet bars. CGA--Katrin for balance and UE+LE placement 3 x 1.5 minutes  Other exercises 3: 6\" alternating toe taps with (B) HR 1x10ea, progressing to single UE support 1x10ea     2nd PT session: Pt in recliner upon arrival. SPT recliner<=>W/C at [facility] with narrow ROMAINE and walker management required VC. Car transfer x 2 completions with use of RW at [facility] required VC for turning and squaring up to seat. Ambulation weaving around cones with RW x3 CGA-Min A for RW management narrow ROMAINE, decreased noble, and small stride length. Ashley stepping using step to pattern x 4 with RW @ Katrin during time spent in SLS. Pt returned to recliner with chair alarm on, call light in place, and all needs met. Goals  Short term goals  Time Frame for Short term goals: 2 weeks  Short term goal 1: Pt to complete bed mobility at modified independent level with use of bed rail. Short term goal 2: Pt to complete transfers at [facility]  Short term goal 3: Pt to ambulate 150 ft with RW at [facility]  Short term goal 4: Pt to ascend/descend 4 steps with (B) HR at [facility]  Short term goal 5: Pt to complete car transfer at [facility]  Patient Goals   Patient goals : To reduce falling episodes    Plan    Plan  Times per week: 5x/week, 75 min/day  Times per day: Daily  Current Treatment Recommendations: Strengthening,Balance Training,Functional Mobility Training,Transfer Training,ADL/Self-care Training,Stair training,Endurance Training,Gait Training,Neuromuscular Re-education,Positioning,Modalities,Patient/Caregiver Education & Training,Safety Education & Training  Safety Devices  Type of devices:  All fall risk precautions in place,Call light within reach,Gait belt,Chair alarm in place,Left in chair  Restraints  Initially in place: No     Therapy Time   Individual Concurrent Group Co-treatment   Time In 0915         Time Out 1005         Minutes 50         Timed Code Treatment Minutes: 48 Minutes     Second Session Therapy Time:   Individual Concurrent Group Co-treatment   Time In 1115         Time Out 1205         Minutes 50           Timed Code Treatment Minutes:  50 + 50    Total Treatment Minutes:  100 minutes    Zelphia Sandifer, SPT  Therapist observed and directed the patient's plan of care.   Co-signed and supervised by: Kristen Judd PT, DPT - [id number]

## 2022-01-14 NOTE — FLOWSHEET NOTE
Treatment time: 3 hrs     Net UF: 1.0 kg     Pre weight: 85.5 kg   Post weight: 84.5 kg   EDW: 88.0 kg     Access used: R CVC   Access function: Well      Medications or blood products given: None     Regular outpatient schedule: MWF     Summary of response to treatment: Pt tolerated tx well     Copy of dialysis treatment record placed in chart, to be scanned into EMR.       01/14/22 1648   Post-Hemodialysis Assessment   Rinseback Volume (ml) 400 ml   Total Liters Processed (l/min) 67.4 l/min   Dialyzer Clearance Lightly streaked   Duration of Treatment (minutes) 180 minutes   Hemodialysis Intake (ml) 400 ml   Hemodialysis Output (ml) 1400 ml   NET Removed (ml) 1000 ml

## 2022-01-14 NOTE — PROGRESS NOTES
Occupational Therapy  Facility/Department: [facility] ACUTE REHAB UNIT  Daily Treatment Note  NAME: Maribel Iglesias  : 1975  MRN: [id number]    Date of Service: 2022    Discharge Recommendations:  Home with Home health OT,24 hour supervision or assist,S Level 3  OT Equipment Recommendations  Other: owns grab bars, shower chair    Assessment   Performance deficits / Impairments: Decreased functional mobility ; Decreased ADL status; Decreased endurance;Decreased balance;Decreased vision/visual deficit; Decreased safe awareness  Assessment: Pt is well below her baseline level of occupational function, based on the above deficits associated with debility and recurrent. Pt has a chronic visual deficit. Pt would benefit from continued skilled acute OT services to address these deficits. Pt is progressing well, demo's increased ability to complete LB tasks and requiring less VCs to attend to activities.  Continue POC  Treatment Diagnosis: Decreased ADL status, functional mobility, endurance, balance, and safety awareness associated with ESRD, debility,and recurrent falls  Prognosis: Good  History: Pt lives w/spouse, previously S level w/ADLs, multiple falls PTA  OT Education: OT Role;Plan of Care;ADL Adaptive Strategies;Transfer Training;Energy Conservation  Patient Education: Patient verbalized understanding, reinforcement for increased carryover  Barriers to Learning: Visual  REQUIRES OT FOLLOW UP: Yes  Activity Tolerance  Activity Tolerance: Patient Tolerated treatment well  Safety Devices  Safety Devices in place: Yes  Type of devices:  (with PT end of session)  Restraints  Initially in place: No         Patient Diagnosis(es): Debility      has a past medical history of Blind in both eyes, Cerebral artery occlusion with cerebral infarction ([facility] CHF (congestive heart failure) ([facility] Chronic kidney disease, Depression, Diabetes mellitus out of control ([address] Diabetes mellitus, type II ([address] Diabetic neuropathy associated with type 2 diabetes mellitus ([facility] Generalized headaches, Hypertension, Infertility, Insomnia, Migraine headache, Mixed hyperlipidemia, Otitis media, Pelvic abscess in female, Pneumonia, Stroke [facility]), and Stroke ([facility] has a past surgical history that includes Cervix surgery; eye surgery; Foot surgery (Right); Foot surgery (Bilateral); Foot surgery (Left); and IR TUNNELED CVC PLACE WO SQ PORT/PUMP > 5 YEARS (9/7/2021). Restrictions  Restrictions/Precautions  Restrictions/Precautions: Fall Risk  Required Braces or Orthoses?: No  Position Activity Restriction  Other position/activity restrictions: 51-year-old female with a history of ESRD on HD, diabetes, diabetic neuropathy, legal blindness, CVA with resultant right hemiparesis, HTN, and HLD who was admitted on 1/4 with recurrent falls. Due to increasing difficulty with ambulation and transfers, she had missed 2 dialysis sessions prior to admission. She was recently discharged on 12/29 for an admission with generalized weakness and falls consistent with this admission. She scored well enough with therapy evaluations to discharge to home however it does not appear she is able to care for herself with the assistance of her . She was evaluated by therapy and suggested to continue in an inpatient setting prior to returning home. Patient reports that she had her initial stroke in August and discharged to home with outpatient therapies and subsequently her second stroke in late 2021 resulted in her discharging to [address]. She felt that her therapies were not as effective or intensive as her outpatient therapies. She reports no current complaints.   Subjective   General  Chart Reviewed: Yes  Patient assessed for rehabilitation services?: Yes  Response to previous treatment: Patient with no complaints from previous session  Family / Caregiver Present: No  Diagnosis: Debility, Recurrent Falls  Subjective  Subjective: pt in supine in bed on arrival - denies pain - states she slept well last night      Orientation   WFL  Objective    ADL  Grooming: Contact guard assistance;Setup (CGA in standing at sink to complete oral care - completed washing face half in standing and half in sitting)  LE Dressing: Contact guard assistance;Verbal cueing; Increased time to complete  Additional Comments: pt in bed finishing breakfast on arrival - donned socks and shoes sitting EOB, pt with difficulty maintaining upright posture seated EOB but did not lose balance, transitioned to w/c to finish tying shoes - pt agreed that supported sitting assisted her be more indep with LB dressing but difficulty identifying a way to adapt home environment to support this (has a rocking chair in her room, states another type of chair would not fit) - ambulated to/from bathroom with RW and CGA for oral care - reports fatigue but needs therapist assist to identify need to sit - CGA to ambulate to chair, min A provided due to LOB when backing up to approach chair  Instrumental ADL's  Instrumental ADLs: Yes  Light Housekeeping  Light Housekeeping Level: Other  Light Housekeeping Level of Assistance: Minimal assistance  Light Housekeeping: standing at washer to load clothing into washer with SBA - assist to set dials of washer         Cognition  Overall Cognitive Status: Exceptions  Arousal/Alertness: Inconsistent responses to stimuli;Delayed responses to stimuli  Following Commands: Follows one step commands with increased time; Follows one step commands with repetition  Attention Span: Attends with cues to redirect; Difficulty dividing attention  Memory: Appears intact  Safety Judgement: Decreased awareness of need for assistance;Decreased awareness of need for safety  Problem Solving: Assistance required to generate solutions;Assistance required to implement solutions  Insights: Decreased awareness of deficits  Initiation: Requires cues for some  Sequencing: Requires cues for some  Cognition Comment: able to dual task in supported sitting - more difficulty in sitting and standing, especially with fatigue           Second Session:  Pt seated in recliner at entry, agreeable to session and requesting bathing/dressing. Pt ambulated to/from bathroom for ADLs- UB/LB sponge bathing completed alternating between sitting/standing- increased time to complete, pt easily distracted requiring redirection to tasks at times- UB bathing/dressing with SBA, CGA for stances for LB ADL tasks- all other tasks completed with set-up/SBA w/c level. Pt assisted with hair washing using hand held shower secondary to time. All sit<>stands and RW mobility completed at [facility]. SPT w/c>EOB at EOS at [facility], sit>supine with increased times at SBA. Pt left in bed, bed alarm on, call light and needs in reach. Plan   Plan  Times per week: 75 minutes 5 days per week  Times per day: Daily  Current Treatment Recommendations: Safety Education & Training,Self-Care / [place] Mobility Training,Balance Training       Goals  Short term goals  Time Frame for Short term goals: 2 weeks  Short term goal 1: SBA functional transfers to toilet and shower-- not met, progressing  Short term goal 2: SBA UB ADLs-- not met, progressing  Short term goal 3: min A LB ADLs-- CGA 1/14 for socks and shoes  Short term goal 4: CGA functional mobility for ADLs-- not met, progressing  Patient Goals   Patient goals : to get stronger, stop falling       Therapy Time   Individual Concurrent Group Co-treatment   Time In 0840         Time Out 0915         Minutes 35         Timed Code Treatment Minutes: 35 Minutes   Total minutes Vianey Alexandra, NIALL Dunbar OTR/L [id number], 1/14/2022, 9:36 AM     Second Session Therapy Time:   Individual Concurrent Group Co-treatment   Time In 2782        Time Out 1330       Minutes 45          Timed Code Treatment Minutes:  35 + 45    Total Treatment Minutes:  80    Huan Snyder

## 2022-01-15 LAB
BACTERIA: ABNORMAL /HPF
BILIRUBIN URINE: NEGATIVE
BLOOD, URINE: ABNORMAL
CLARITY: ABNORMAL
COLOR: YELLOW
COMMENT UA: ABNORMAL
EPITHELIAL CELLS, UA: 11 /HPF (ref 0–5)
GLUCOSE BLD-MCNC: 103 MG/DL (ref 70–99)
GLUCOSE BLD-MCNC: 117 MG/DL (ref 70–99)
GLUCOSE BLD-MCNC: 135 MG/DL (ref 70–99)
GLUCOSE BLD-MCNC: 172 MG/DL (ref 70–99)
GLUCOSE URINE: 100 MG/DL
HYALINE CASTS: 10 /LPF (ref 0–8)
KETONES, URINE: NEGATIVE MG/DL
LEUKOCYTE ESTERASE, URINE: ABNORMAL
MICROSCOPIC EXAMINATION: YES
NITRITE, URINE: NEGATIVE
PERFORMED ON: ABNORMAL
PH UA: 7.5 (ref 5–8)
PROTEIN UA: >=300 MG/DL
RBC UA: ABNORMAL /HPF (ref 0–4)
SPECIFIC GRAVITY UA: 1.01 (ref 1–1.03)
URINE REFLEX TO CULTURE: YES
URINE TYPE: ABNORMAL
UROBILINOGEN, URINE: 0.2 E.U./DL
WBC UA: 113 /HPF (ref 0–5)

## 2022-01-15 PROCEDURE — 87086 URINE CULTURE/COLONY COUNT: CPT

## 2022-01-15 PROCEDURE — 6370000000 HC RX 637 (ALT 250 FOR IP): Performed by: PHYSICAL MEDICINE & REHABILITATION

## 2022-01-15 PROCEDURE — 6370000000 HC RX 637 (ALT 250 FOR IP): Performed by: INTERNAL MEDICINE

## 2022-01-15 PROCEDURE — 87186 SC STD MICRODIL/AGAR DIL: CPT

## 2022-01-15 PROCEDURE — 87077 CULTURE AEROBIC IDENTIFY: CPT

## 2022-01-15 PROCEDURE — 1280000000 HC REHAB R&B

## 2022-01-15 PROCEDURE — 81001 URINALYSIS AUTO W/SCOPE: CPT

## 2022-01-15 PROCEDURE — 94640 AIRWAY INHALATION TREATMENT: CPT

## 2022-01-15 PROCEDURE — 6360000002 HC RX W HCPCS: Performed by: PHYSICAL MEDICINE & REHABILITATION

## 2022-01-15 RX ORDER — SULFAMETHOXAZOLE AND TRIMETHOPRIM 800; 160 MG/1; MG/1
1 TABLET ORAL DAILY
Status: DISCONTINUED | OUTPATIENT
Start: 2022-01-15 | End: 2022-01-21 | Stop reason: HOSPADM

## 2022-01-15 RX ADMIN — HEPARIN SODIUM 5000 UNITS: 5000 INJECTION INTRAVENOUS; SUBCUTANEOUS at 21:34

## 2022-01-15 RX ADMIN — ASPIRIN 81 MG: 81 TABLET, COATED ORAL at 08:12

## 2022-01-15 RX ADMIN — TIOTROPIUM BROMIDE AND OLODATEROL 2 PUFF: 3.124; 2.736 SPRAY, METERED RESPIRATORY (INHALATION) at 08:09

## 2022-01-15 RX ADMIN — HEPARIN SODIUM 5000 UNITS: 5000 INJECTION INTRAVENOUS; SUBCUTANEOUS at 06:53

## 2022-01-15 RX ADMIN — ALPRAZOLAM 0.75 MG: 0.5 TABLET ORAL at 21:34

## 2022-01-15 RX ADMIN — PREGABALIN 75 MG: 75 CAPSULE ORAL at 21:34

## 2022-01-15 RX ADMIN — ALPRAZOLAM 0.75 MG: 0.5 TABLET ORAL at 16:31

## 2022-01-15 RX ADMIN — HEPARIN SODIUM 5000 UNITS: 5000 INJECTION INTRAVENOUS; SUBCUTANEOUS at 13:13

## 2022-01-15 RX ADMIN — SULFAMETHOXAZOLE AND TRIMETHOPRIM 1 TABLET: 800; 160 TABLET ORAL at 12:45

## 2022-01-15 RX ADMIN — BENZONATATE 200 MG: 100 CAPSULE ORAL at 16:30

## 2022-01-15 RX ADMIN — AMLODIPINE BESYLATE 10 MG: 5 TABLET ORAL at 08:12

## 2022-01-15 RX ADMIN — BUPROPION HYDROCHLORIDE 150 MG: 150 TABLET, EXTENDED RELEASE ORAL at 08:12

## 2022-01-15 RX ADMIN — BENZONATATE 200 MG: 100 CAPSULE ORAL at 06:54

## 2022-01-15 RX ADMIN — LISINOPRIL 20 MG: 20 TABLET ORAL at 08:12

## 2022-01-15 RX ADMIN — ALPRAZOLAM 0.75 MG: 0.5 TABLET ORAL at 06:54

## 2022-01-15 RX ADMIN — INSULIN LISPRO 2 UNITS: 100 INJECTION, SOLUTION INTRAVENOUS; SUBCUTANEOUS at 12:33

## 2022-01-15 RX ADMIN — INSULIN GLARGINE 17 UNITS: 100 INJECTION, SOLUTION SUBCUTANEOUS at 08:15

## 2022-01-15 RX ADMIN — PROPRANOLOL HYDROCHLORIDE 80 MG: 80 CAPSULE, EXTENDED RELEASE ORAL at 08:12

## 2022-01-15 RX ADMIN — FLUVOXAMINE MALEATE 100 MG: 50 TABLET, COATED ORAL at 21:37

## 2022-01-15 RX ADMIN — SPIRONOLACTONE 50 MG: 25 TABLET ORAL at 08:12

## 2022-01-15 RX ADMIN — FUROSEMIDE 10 MG: 20 TABLET ORAL at 08:12

## 2022-01-15 RX ADMIN — ATORVASTATIN CALCIUM 40 MG: 40 TABLET, FILM COATED ORAL at 21:34

## 2022-01-15 ASSESSMENT — PAIN SCALES - GENERAL
PAINLEVEL_OUTOF10: 3
PAINLEVEL_OUTOF10: 0
PAINLEVEL_OUTOF10: 0

## 2022-01-15 ASSESSMENT — PAIN DESCRIPTION - DESCRIPTORS: DESCRIPTORS: SORE

## 2022-01-15 ASSESSMENT — PAIN DESCRIPTION - FREQUENCY: FREQUENCY: INTERMITTENT

## 2022-01-15 ASSESSMENT — PAIN DESCRIPTION - LOCATION: LOCATION: BACK

## 2022-01-15 ASSESSMENT — PAIN DESCRIPTION - PROGRESSION: CLINICAL_PROGRESSION: GRADUALLY IMPROVING

## 2022-01-15 ASSESSMENT — PAIN DESCRIPTION - PAIN TYPE: TYPE: ACUTE PAIN

## 2022-01-15 ASSESSMENT — PAIN DESCRIPTION - ORIENTATION: ORIENTATION: LOWER

## 2022-01-15 ASSESSMENT — PAIN DESCRIPTION - ONSET: ONSET: ON-GOING

## 2022-01-15 ASSESSMENT — PAIN - FUNCTIONAL ASSESSMENT: PAIN_FUNCTIONAL_ASSESSMENT: ACTIVITIES ARE NOT PREVENTED

## 2022-01-15 NOTE — PROGRESS NOTES
MD Evelyn Mcknight MD Roselle Bhat, MD                Office: [phone]                      Fax: [phone]          Inpatient  Progress Note:     PATIENT NAME: Oneil Kaba  : 1975  MRN: [id number]         IMPRESSION / RECOMMENDATION:       Admitted for:  Recurrent falls [R29.6]        ESRD-tolerating dialysis well-had treatment yesterday. Fluid overload-improved with dialysis. Hypertension. Stable. Hyperkalemia- continue on renal diet    Medications reviewed. Monitor anemia levels.                   1. ESRD on iHD: MWF ,  - follows w/ Dr. Alyssa Swain- [facility] dialysis center   - access: [facility], [facility]   - consult vascular surgery for AV access     - outpt HD center: [facility], Dr Alyssa Swain   - recently started HD in 2021, during admission w/ ATN w/ KINZA on CKD-4, was lost for f/u,), had acute hypoxic respiratory failure, pneumonia - needing intubation in past   - missed HD x2  - encouraged compliance as if her solutes are better control, she may feel overall better    - so needed HD on admission during inpatient stay  - now admitted to ARU for inpatient rehab      HD Friday   More UOP - noted - so monitor     Labs MWF - f/u K   Add Low K diet  Decrease Lisinopril 40 -> 20 mg QD      EDW listed 88 (but per pt 85 kg)  81 kg pre-HD So minimal UF needed       2. Hypertension/Volume Status:  - BP HTN - hx of resistant HTN - slightly uncontrolled - f/u after HD + resume home meds   - EDW reported 88  -> 83 kg currently - so might be her new DW  - Na - chronic hypoNatremia - f/u w/ HD       3. Electrolytes/acid-base:  K: WNL  High AG metabolic acidosis: mild - f/u w/ HD      4. Anemia of renal failure: at goal  - RIA: w/ HD     5.  BMD:  - Phos: follow iPTH outpt        : Other supportive care :   - Check daily renal function panel with electrolytes-phosphorus  - Strict monitoring of I/Os, daily weight  - Renal feeds/diet  - Current medications reviewed. - Nephrotoxic medications have been discontinued. - Dose adjusted and appropriate. - Dose meds for eGFR <15 mL/min/1.73m2.               - Avoid heavy opioids due to renal failure - may use very low dose dilaudid / fentanyl with close monitoring of CNS and respiratory depression.             Other Problems:  Recurrent falls [R29.6]       Please refer to orders. Multiple complex problems. High risk  Discussed with patient, and treatment team    Thank you for allowing me to participate in this patient's care. Please do not hesitate to contact me with any questions/concerns. We will follow along with you.           Brianna Enrique MD  [facility] [address] [place]: [phone] or Via [employer]  Fax: [phone]     Time spent  ~ 35 minutes that included face-to-face meeting/discussion with patient, and treatment team (including primary/referring team and other consultants; included coordination of care with the treatment team; and review of patient's electronic medical records and ordering appropriates tests.           This patient is COVID-19 , so in a strict isolation, as per current protocol; So in order to preserve PPE supply and to avoid unnecessary exposure to prevent transmission of COVID-19; this patient was NOT examined face to face, as risk of contact outweighs the benefits and likely would not change much of my clinical management. HPI, review of system and physical exam was limited, as it was mainly obtained from chart review and the primary team. But all relevant records and diagnostic tests were reviewed, including laboratory and imaging results. Patient's care is being coordinated with the primary service.   Subjective:  Seen via doors-windows-glass   no distress      Vitals:    01/15/22 0755   BP: 118/76   Pulse: 79   Resp: 16   Temp: 97.8 °F (36.6 °C)   SpO2: 93%         Physical exam:    In-person bedside physical examination deferred. Pursuant to the emergency declaration under the [facility], [address] [place] [address] authority and the [employer] and [employer] Act, this clinical encounter was conducted to provide necessary medical care. (Also consistent with new provisions and guidance offered by [facility] on March 18, 2020 in setting of COVID 19 outbreak and in order to preserve personal protective equipment in accordance with the flexibilities announced by CMS on March 30, 2020)   References: https://[facility]. [facility]/Portals/0/Resources/COVID-19/3_18%20Telemed%20Guidance%20Updated%20March%2018. pdf?dql=[email or web address]                      https://[facility]. [facility]/Portals/0/Resources/COVID-19/3_18%20Telemed%20Guidance%20Updated%20March%2018. pdf?crn=[email or web address]                      http://[employer]/. pdf    However, I did visually inspect the patient and observed the following: via glass doors-windows                  Labs Reviewed  by me   Labs   Lab Results   Component Value Date    CREATININE 6.1 (HH) 01/14/2022    BUN 56 (H) 01/14/2022     (L) 01/14/2022    K 5.4 (H) 01/14/2022    CL 95 (L) 01/14/2022    CO2 22 01/14/2022     Lab Results   Component Value Date    WBC 12.3 (H) 01/14/2022    HGB 9.7 (L) 01/14/2022    HCT 30.5 (L) 01/14/2022    MCV 81.1 01/14/2022     01/14/2022       Dialysis Treatment and Prescription reviewed

## 2022-01-15 NOTE — PLAN OF CARE
Education Provided as follows:  Family/Patient made aware of the tailored interventions falls plan using the TIPS TOOL.        Problem: Falls - Risk of:  Goal: Will remain free from falls  Description: Will remain free from falls  Outcome: Ongoing  Goal: Absence of physical injury  Description: Absence of physical injury  Outcome: Ongoing     Problem: IP BOWEL ELIMINATION  Goal: LTG - patient will have regular and routine bowel evacuation  Outcome: Ongoing  Goal: STG - Patient will verbalize signs and symptoms of constipation and how to prevent/alleviate  Outcome: Ongoing     Problem: IP BLADDER/VOIDING  Goal: STG - Patient will state signs and symptoms of UTI  Outcome: Ongoing     Problem: SKIN INTEGRITY  Goal: LTG - Patient will be free from infection  Outcome: Ongoing  Goal: STG - Patient demonstrates preventative skin care measures  Outcome: Ongoing

## 2022-01-15 NOTE — PLAN OF CARE
Problem: Falls - Risk of:  Goal: Will remain free from falls  Description: Will remain free from falls  1/15/2022 1017 by Frandy Roberts RN  Outcome: Ongoing  Note: Education Provided as follows:  Family/Patient made aware of the tailored interventions falls plan using the TIPS TOOL.       Problem: SKIN INTEGRITY  Goal: LTG - Patient will be free from infection  1/15/2022 1017 by Frandy Roberts RN  Outcome: Ongoing     Problem: Pain:  Goal: Pain level will decrease  Description: Pain level will decrease  1/15/2022 1017 by Frandy Roberts RN  Outcome: Ongoing     Problem: Skin Integrity:  Goal: Absence of new skin breakdown  Description: Absence of new skin breakdown  1/15/2022 1017 by Frandy Roberts RN  Outcome: Ongoing

## 2022-01-16 LAB
GLUCOSE BLD-MCNC: 143 MG/DL (ref 70–99)
GLUCOSE BLD-MCNC: 159 MG/DL (ref 70–99)
GLUCOSE BLD-MCNC: 189 MG/DL (ref 70–99)
GLUCOSE BLD-MCNC: 88 MG/DL (ref 70–99)
PERFORMED ON: ABNORMAL
PERFORMED ON: NORMAL

## 2022-01-16 PROCEDURE — 1280000000 HC REHAB R&B

## 2022-01-16 PROCEDURE — 6370000000 HC RX 637 (ALT 250 FOR IP): Performed by: PHYSICAL MEDICINE & REHABILITATION

## 2022-01-16 PROCEDURE — 6360000002 HC RX W HCPCS: Performed by: PHYSICAL MEDICINE & REHABILITATION

## 2022-01-16 PROCEDURE — 6370000000 HC RX 637 (ALT 250 FOR IP): Performed by: INTERNAL MEDICINE

## 2022-01-16 RX ADMIN — HEPARIN SODIUM 5000 UNITS: 5000 INJECTION INTRAVENOUS; SUBCUTANEOUS at 15:58

## 2022-01-16 RX ADMIN — ALPRAZOLAM 0.75 MG: 0.5 TABLET ORAL at 06:08

## 2022-01-16 RX ADMIN — AMLODIPINE BESYLATE 10 MG: 5 TABLET ORAL at 08:40

## 2022-01-16 RX ADMIN — BENZONATATE 200 MG: 100 CAPSULE ORAL at 06:08

## 2022-01-16 RX ADMIN — ASPIRIN 81 MG: 81 TABLET, COATED ORAL at 08:40

## 2022-01-16 RX ADMIN — PROPRANOLOL HYDROCHLORIDE 80 MG: 80 CAPSULE, EXTENDED RELEASE ORAL at 08:55

## 2022-01-16 RX ADMIN — SULFAMETHOXAZOLE AND TRIMETHOPRIM 1 TABLET: 800; 160 TABLET ORAL at 08:40

## 2022-01-16 RX ADMIN — SPIRONOLACTONE 50 MG: 25 TABLET ORAL at 08:40

## 2022-01-16 RX ADMIN — FUROSEMIDE 10 MG: 20 TABLET ORAL at 08:40

## 2022-01-16 RX ADMIN — HEPARIN SODIUM 5000 UNITS: 5000 INJECTION INTRAVENOUS; SUBCUTANEOUS at 06:08

## 2022-01-16 RX ADMIN — LISINOPRIL 20 MG: 20 TABLET ORAL at 08:40

## 2022-01-16 RX ADMIN — INSULIN GLARGINE 17 UNITS: 100 INJECTION, SOLUTION SUBCUTANEOUS at 08:58

## 2022-01-16 RX ADMIN — PREGABALIN 75 MG: 75 CAPSULE ORAL at 22:18

## 2022-01-16 RX ADMIN — HEPARIN SODIUM 5000 UNITS: 5000 INJECTION INTRAVENOUS; SUBCUTANEOUS at 22:18

## 2022-01-16 RX ADMIN — FLUVOXAMINE MALEATE 100 MG: 50 TABLET, COATED ORAL at 22:40

## 2022-01-16 RX ADMIN — ALPRAZOLAM 0.75 MG: 0.5 TABLET ORAL at 22:17

## 2022-01-16 RX ADMIN — ATORVASTATIN CALCIUM 40 MG: 40 TABLET, FILM COATED ORAL at 22:17

## 2022-01-16 RX ADMIN — INSULIN LISPRO 1 UNITS: 100 INJECTION, SOLUTION INTRAVENOUS; SUBCUTANEOUS at 22:31

## 2022-01-16 RX ADMIN — INSULIN LISPRO 2 UNITS: 100 INJECTION, SOLUTION INTRAVENOUS; SUBCUTANEOUS at 12:32

## 2022-01-16 RX ADMIN — BENZONATATE 200 MG: 100 CAPSULE ORAL at 22:24

## 2022-01-16 RX ADMIN — INSULIN LISPRO 2 UNITS: 100 INJECTION, SOLUTION INTRAVENOUS; SUBCUTANEOUS at 16:49

## 2022-01-16 RX ADMIN — BUPROPION HYDROCHLORIDE 150 MG: 150 TABLET, EXTENDED RELEASE ORAL at 08:40

## 2022-01-16 ASSESSMENT — PAIN DESCRIPTION - LOCATION
LOCATION: BACK
LOCATION: BACK

## 2022-01-16 ASSESSMENT — PAIN DESCRIPTION - FREQUENCY
FREQUENCY: INTERMITTENT
FREQUENCY: INTERMITTENT

## 2022-01-16 ASSESSMENT — PAIN DESCRIPTION - ORIENTATION
ORIENTATION: LOWER
ORIENTATION: LOWER

## 2022-01-16 ASSESSMENT — PAIN DESCRIPTION - PAIN TYPE
TYPE: ACUTE PAIN
TYPE: ACUTE PAIN

## 2022-01-16 ASSESSMENT — PAIN DESCRIPTION - ONSET
ONSET: ON-GOING
ONSET: ON-GOING

## 2022-01-16 ASSESSMENT — PAIN SCALES - GENERAL
PAINLEVEL_OUTOF10: 0
PAINLEVEL_OUTOF10: 3
PAINLEVEL_OUTOF10: 3

## 2022-01-16 NOTE — PROGRESS NOTES
MD Shashi Lucas MD Darry Gilding, MD                Office: [phone]                      Fax: [phone]          Inpatient  Progress Note:     PATIENT NAME: Viet Arteaga  : 1975  MRN: [id number]         IMPRESSION / RECOMMENDATION:       Admitted for:  Recurrent falls [R29.6]        ESRD-tolerating dialysis well    -We will provide for dialysis treatment tomorrow. Labs in AM.    Vital signs stable. Hypertension stable. Continue renal diet    Medications reviewed. Monitor anemia levels.                   1. ESRD on iHD: MWF ,  - follows w/ Dr. Saeid Rg- [facility] dialysis center   - access: [facility], [facility] [place]   - consult vascular surgery for AV access     - outpt HD center: [facility], Dr Saeid Rg   - recently started HD in 2021, during admission w/ ATN w/ KINZA on CKD-4, was lost for f/u,), had acute hypoxic respiratory failure, pneumonia - needing intubation in past   - missed HD x2  - encouraged compliance as if her solutes are better control, she may feel overall better    - so needed HD on admission during inpatient stay  - now admitted to ARU for inpatient rehab      HD Friday   More UOP - noted - so monitor     Labs MWF - f/u K   Add Low K diet  Decrease Lisinopril 40 -> 20 mg QD      EDW listed 88 (but per pt 85 kg)  81 kg pre-HD So minimal UF needed       2. Hypertension/Volume Status:  - BP HTN - hx of resistant HTN - slightly uncontrolled - f/u after HD + resume home meds   - EDW reported 88  -> 83 kg currently - so might be her new DW  - Na - chronic hypoNatremia - f/u w/ HD       3. Electrolytes/acid-base:  K: WNL  High AG metabolic acidosis: mild - f/u w/ HD      4. Anemia of renal failure: at goal  - RIA: w/ HD     5.  BMD:  - Phos: follow iPTH outpt        : Other supportive care :   - Check daily renal function panel with electrolytes-phosphorus  - Strict monitoring of I/Os, daily weight  - Renal feeds/diet  - Current medications reviewed. - Nephrotoxic medications have been discontinued. - Dose adjusted and appropriate. - Dose meds for eGFR <15 mL/min/1.73m2.               - Avoid heavy opioids due to renal failure - may use very low dose dilaudid / fentanyl with close monitoring of CNS and respiratory depression.             Other Problems:  Recurrent falls [R29.6]       Please refer to orders. Multiple complex problems. High risk  Discussed with patient, and treatment team    Thank you for allowing me to participate in this patient's care. Please do not hesitate to contact me with any questions/concerns. We will follow along with you.           Jono Trevizo MD  [facility] [address] [place] [address] [place]: [phone] or Via [employer]  Fax: [phone]     Time spent  ~ 35 minutes that included face-to-face meeting/discussion with patient, and treatment team (including primary/referring team and other consultants; included coordination of care with the treatment team; and review of patient's electronic medical records and ordering appropriates tests.           This patient is COVID-19 , so in a strict isolation, as per current protocol; So in order to preserve PPE supply and to avoid unnecessary exposure to prevent transmission of COVID-19; this patient was NOT examined face to face, as risk of contact outweighs the benefits and likely would not change much of my clinical management. HPI, review of system and physical exam was limited, as it was mainly obtained from chart review and the primary team. But all relevant records and diagnostic tests were reviewed, including laboratory and imaging results. Patient's care is being coordinated with the primary service.   Subjective:  Seen via doors-windows-glass   no distress      Vitals:    01/16/22 0855   BP: 117/78   Pulse: 65   Resp: 17   Temp: 97.9 °F (36.6 °C)   SpO2: 95%         Physical exam:    In-person bedside physical examination deferred. Pursuant to the emergency declaration under the [facility], [address] [place] [address] authority and the [employer] and [employer] Act, this clinical encounter was conducted to provide necessary medical care. (Also consistent with new provisions and guidance offered by [facility] on March 18, 2020 in setting of COVID 19 outbreak and in order to preserve personal protective equipment in accordance with the flexibilities announced by CMS on March 30, 2020)   References: https://[facility]. [facility]/Portals/0/Resources/COVID-19/3_18%20Telemed%20Guidance%20Updated%20March%2018. pdf?syb=[email or web address]                      https://[facility]. [facility]/Portals/0/Resources/COVID-19/3_18%20Telemed%20Guidance%20Updated%20March%2018. pdf?hri=[email or web address]                      http://[employer]/. pdf    However, I did visually inspect the patient and observed the following: via glass doors-windows                  Labs Reviewed  by me   Labs   Lab Results   Component Value Date    CREATININE 6.1 (HH) 01/14/2022    BUN 56 (H) 01/14/2022     (L) 01/14/2022    K 5.4 (H) 01/14/2022    CL 95 (L) 01/14/2022    CO2 22 01/14/2022     Lab Results   Component Value Date    WBC 12.3 (H) 01/14/2022    HGB 9.7 (L) 01/14/2022    HCT 30.5 (L) 01/14/2022    MCV 81.1 01/14/2022     01/14/2022       Dialysis Treatment and Prescription reviewed

## 2022-01-16 NOTE — PLAN OF CARE
Education Provided as follows:  Family/Patient made aware of the tailored interventions falls plan using the TIPS TOOL. Problem: Falls - Risk of:  Goal: Will remain free from falls  Description: Will remain free from falls  1/16/2022 1007 by Tenisha Flynn RN  Outcome: Ongoing  1/15/2022 2132 by John Colunga RN  Outcome: Ongoing  Goal: Absence of physical injury  Description: Absence of physical injury  Outcome: Ongoing     Problem: IP BOWEL ELIMINATION  Goal: LTG - patient will have regular and routine bowel evacuation  Outcome: Ongoing  Goal: STG - Patient will verbalize signs and symptoms of constipation and how to prevent/alleviate  Outcome: Ongoing     Problem: IP BLADDER/VOIDING  Goal: STG - Patient will state signs and symptoms of UTI  Outcome: Ongoing     Problem: SKIN INTEGRITY  Goal: LTG - Patient will be free from infection  Outcome: Ongoing  Goal: STG - Patient demonstrates preventative skin care measures  Outcome: Ongoing     Problem: Airway Clearance - Ineffective  Goal: Achieve or maintain patent airway  Outcome: Ongoing     Problem: Gas Exchange - Impaired  Goal: Absence of hypoxia  Outcome: Ongoing  Goal: Promote optimal lung function  Outcome: Ongoing     Problem:  Body Temperature -  Risk of, Imbalanced  Goal: Ability to maintain a body temperature within defined limits  Outcome: Ongoing  Goal: Will regain or maintain usual level of consciousness  Outcome: Ongoing  Goal: Complications related to the disease process, condition or treatment will be avoided or minimized  Outcome: Ongoing     Problem: Breathing Pattern - Ineffective  Goal: Ability to achieve and maintain a regular respiratory rate  Outcome: Ongoing     Problem: Isolation Precautions - Risk of Spread of Infection  Goal: Prevent transmission of infection  Outcome: Ongoing     Problem: Nutrition Deficits  Goal: Optimize nutritional status  Outcome: Ongoing

## 2022-01-16 NOTE — PLAN OF CARE
Problem: Falls - Risk of:  Goal: Will remain free from falls  Description: Will remain free from falls  1/15/2022 2132 by Marylu Han RN  Outcome: Ongoing   Education Provided as follows:  Family/Patient made aware of the tailored interventions falls plan using the TIPS TOOL.

## 2022-01-16 NOTE — PROGRESS NOTES
Shift assessment completed (see flowsheet). Plan of Cares reviewed and patient education provided with patient stating understanding. Morning medications administered as ordered. All patient needs met at present time. Call light within reach.

## 2022-01-17 LAB
ALBUMIN SERPL-MCNC: 3.7 G/DL (ref 3.4–5)
ANION GAP SERPL CALCULATED.3IONS-SCNC: 20 MMOL/L (ref 3–16)
BUN BLDV-MCNC: 78 MG/DL (ref 7–20)
CALCIUM SERPL-MCNC: 8.3 MG/DL (ref 8.3–10.6)
CHLORIDE BLD-SCNC: 92 MMOL/L (ref 99–110)
CO2: 19 MMOL/L (ref 21–32)
CREAT SERPL-MCNC: 7.1 MG/DL (ref 0.6–1.1)
GFR AFRICAN AMERICAN: 8
GFR NON-AFRICAN AMERICAN: 6
GLUCOSE BLD-MCNC: 100 MG/DL (ref 70–99)
GLUCOSE BLD-MCNC: 150 MG/DL (ref 70–99)
GLUCOSE BLD-MCNC: 194 MG/DL (ref 70–99)
GLUCOSE BLD-MCNC: 443 MG/DL (ref 70–99)
HCT VFR BLD CALC: 27.9 % (ref 36–48)
HEMOGLOBIN: 9.1 G/DL (ref 12–16)
MCH RBC QN AUTO: 26 PG (ref 26–34)
MCHC RBC AUTO-ENTMCNC: 32.5 G/DL (ref 31–36)
MCV RBC AUTO: 80.1 FL (ref 80–100)
ORGANISM: ABNORMAL
PDW BLD-RTO: 17.8 % (ref 12.4–15.4)
PERFORMED ON: ABNORMAL
PHOSPHORUS: 8.6 MG/DL (ref 2.5–4.9)
PLATELET # BLD: 228 K/UL (ref 135–450)
PMV BLD AUTO: 8.3 FL (ref 5–10.5)
POTASSIUM SERPL-SCNC: 4.6 MMOL/L (ref 3.5–5.1)
RBC # BLD: 3.48 M/UL (ref 4–5.2)
SODIUM BLD-SCNC: 131 MMOL/L (ref 136–145)
URINE CULTURE, ROUTINE: ABNORMAL
WBC # BLD: 11.8 K/UL (ref 4–11)

## 2022-01-17 PROCEDURE — 36415 COLL VENOUS BLD VENIPUNCTURE: CPT

## 2022-01-17 PROCEDURE — 97129 THER IVNTJ 1ST 15 MIN: CPT

## 2022-01-17 PROCEDURE — 90935 HEMODIALYSIS ONE EVALUATION: CPT

## 2022-01-17 PROCEDURE — 97130 THER IVNTJ EA ADDL 15 MIN: CPT

## 2022-01-17 PROCEDURE — 97530 THERAPEUTIC ACTIVITIES: CPT

## 2022-01-17 PROCEDURE — 6370000000 HC RX 637 (ALT 250 FOR IP): Performed by: PHYSICAL MEDICINE & REHABILITATION

## 2022-01-17 PROCEDURE — 1280000000 HC REHAB R&B

## 2022-01-17 PROCEDURE — 80069 RENAL FUNCTION PANEL: CPT

## 2022-01-17 PROCEDURE — 6360000002 HC RX W HCPCS: Performed by: PHYSICAL MEDICINE & REHABILITATION

## 2022-01-17 PROCEDURE — 97535 SELF CARE MNGMENT TRAINING: CPT

## 2022-01-17 PROCEDURE — 97112 NEUROMUSCULAR REEDUCATION: CPT

## 2022-01-17 PROCEDURE — 85027 COMPLETE CBC AUTOMATED: CPT

## 2022-01-17 PROCEDURE — 97116 GAIT TRAINING THERAPY: CPT

## 2022-01-17 PROCEDURE — 6360000002 HC RX W HCPCS: Performed by: INTERNAL MEDICINE

## 2022-01-17 RX ORDER — LISINOPRIL 10 MG/1
10 TABLET ORAL DAILY
Status: DISCONTINUED | OUTPATIENT
Start: 2022-01-18 | End: 2022-01-21 | Stop reason: HOSPADM

## 2022-01-17 RX ADMIN — BENZONATATE 200 MG: 100 CAPSULE ORAL at 06:44

## 2022-01-17 RX ADMIN — BUPROPION HYDROCHLORIDE 150 MG: 150 TABLET, EXTENDED RELEASE ORAL at 09:41

## 2022-01-17 RX ADMIN — INSULIN GLARGINE 17 UNITS: 100 INJECTION, SOLUTION SUBCUTANEOUS at 08:37

## 2022-01-17 RX ADMIN — PREGABALIN 75 MG: 75 CAPSULE ORAL at 22:00

## 2022-01-17 RX ADMIN — FUROSEMIDE 10 MG: 20 TABLET ORAL at 09:44

## 2022-01-17 RX ADMIN — TRAMADOL HYDROCHLORIDE 50 MG: 50 TABLET, FILM COATED ORAL at 09:41

## 2022-01-17 RX ADMIN — ALPRAZOLAM 0.75 MG: 0.5 TABLET ORAL at 23:23

## 2022-01-17 RX ADMIN — INSULIN LISPRO 2 UNITS: 100 INJECTION, SOLUTION INTRAVENOUS; SUBCUTANEOUS at 11:53

## 2022-01-17 RX ADMIN — HEPARIN SODIUM 5000 UNITS: 5000 INJECTION INTRAVENOUS; SUBCUTANEOUS at 22:00

## 2022-01-17 RX ADMIN — ATORVASTATIN CALCIUM 40 MG: 40 TABLET, FILM COATED ORAL at 21:59

## 2022-01-17 RX ADMIN — SPIRONOLACTONE 50 MG: 25 TABLET ORAL at 09:41

## 2022-01-17 RX ADMIN — ASPIRIN 81 MG: 81 TABLET, COATED ORAL at 09:41

## 2022-01-17 RX ADMIN — INSULIN LISPRO 6 UNITS: 100 INJECTION, SOLUTION INTRAVENOUS; SUBCUTANEOUS at 20:36

## 2022-01-17 RX ADMIN — FLUVOXAMINE MALEATE 100 MG: 50 TABLET, COATED ORAL at 22:02

## 2022-01-17 RX ADMIN — TRAMADOL HYDROCHLORIDE 50 MG: 50 TABLET, FILM COATED ORAL at 18:20

## 2022-01-17 RX ADMIN — ALPRAZOLAM 0.75 MG: 0.5 TABLET ORAL at 18:20

## 2022-01-17 RX ADMIN — BENZONATATE 200 MG: 100 CAPSULE ORAL at 18:20

## 2022-01-17 RX ADMIN — SULFAMETHOXAZOLE AND TRIMETHOPRIM 1 TABLET: 800; 160 TABLET ORAL at 09:42

## 2022-01-17 RX ADMIN — PROPRANOLOL HYDROCHLORIDE 80 MG: 80 CAPSULE, EXTENDED RELEASE ORAL at 09:47

## 2022-01-17 RX ADMIN — ALPRAZOLAM 0.75 MG: 0.5 TABLET ORAL at 06:44

## 2022-01-17 RX ADMIN — INSULIN LISPRO 2 UNITS: 100 INJECTION, SOLUTION INTRAVENOUS; SUBCUTANEOUS at 18:21

## 2022-01-17 RX ADMIN — EPOETIN ALFA-EPBX 7000 UNITS: 10000 INJECTION, SOLUTION INTRAVENOUS; SUBCUTANEOUS at 16:34

## 2022-01-17 RX ADMIN — HEPARIN SODIUM 5000 UNITS: 5000 INJECTION INTRAVENOUS; SUBCUTANEOUS at 05:35

## 2022-01-17 ASSESSMENT — PAIN DESCRIPTION - FREQUENCY
FREQUENCY: INTERMITTENT

## 2022-01-17 ASSESSMENT — PAIN SCALES - GENERAL
PAINLEVEL_OUTOF10: 3
PAINLEVEL_OUTOF10: 5
PAINLEVEL_OUTOF10: 0
PAINLEVEL_OUTOF10: 0
PAINLEVEL_OUTOF10: 5
PAINLEVEL_OUTOF10: 3
PAINLEVEL_OUTOF10: 0
PAINLEVEL_OUTOF10: 0
PAINLEVEL_OUTOF10: 7

## 2022-01-17 ASSESSMENT — PAIN DESCRIPTION - LOCATION
LOCATION: BACK
LOCATION: GENERALIZED
LOCATION: BACK;FOOT
LOCATION: BACK
LOCATION: GENERALIZED

## 2022-01-17 ASSESSMENT — PAIN DESCRIPTION - PAIN TYPE
TYPE: ACUTE PAIN

## 2022-01-17 ASSESSMENT — PAIN DESCRIPTION - DESCRIPTORS
DESCRIPTORS: ACHING
DESCRIPTORS: ACHING;SORE;PINS AND NEEDLES

## 2022-01-17 ASSESSMENT — PAIN DESCRIPTION - PROGRESSION
CLINICAL_PROGRESSION: NOT CHANGED
CLINICAL_PROGRESSION: NOT CHANGED

## 2022-01-17 ASSESSMENT — PAIN DESCRIPTION - ONSET
ONSET: ON-GOING

## 2022-01-17 ASSESSMENT — PAIN DESCRIPTION - ORIENTATION: ORIENTATION: LOWER

## 2022-01-17 NOTE — PROGRESS NOTES
Donte Raygoza  1/17/2022  [id number]    Chief Complaint: Recurrent falls    Subjective:   No significant weekend events. No current complaints. Labs reviewed. Leukocytosis improving. UCx with staph epi. ROS: No CP, SOB, dyspnea    Objective:  Patient Vitals for the past 24 hrs:   BP Temp Temp src Pulse Resp SpO2 Weight   01/17/22 0948 98/64 97.7 °F (36.5 °C) Oral 79 18 97 % --   01/17/22 0941 95/64 -- -- -- -- -- --   01/17/22 0530 -- -- -- -- -- -- 191 lb 5.8 oz (86.8 kg)   01/16/22 2230 130/73 97.6 °F (36.4 °C) Oral 79 17 95 % --     Gen: No distress, pleasant. Resting in WC  HEENT: Normocephalic, atraumatic  CV: Regular rate and rhythm. No MRG   Resp: No respiratory distress. CTAB   Abd: Soft, nontender nondistended  Ext: No edema. Neuro: Alert, oriented, appropriately interactive. Laboratory data: Available via EMR. Therapy progress:  PT  Position Activity Restriction  Other position/activity restrictions: 61-year-old female with a history of ESRD on HD, diabetes, diabetic neuropathy, legal blindness, CVA with resultant right hemiparesis, HTN, and HLD who was admitted on 1/4 with recurrent falls. Due to increasing difficulty with ambulation and transfers, she had missed 2 dialysis sessions prior to admission. She was recently discharged on 12/29 for an admission with generalized weakness and falls consistent with this admission. She scored well enough with therapy evaluations to discharge to home however it does not appear she is able to care for herself with the assistance of her . She was evaluated by therapy and suggested to continue in an inpatient setting prior to returning home. Patient reports that she had her initial stroke in August and discharged to home with outpatient therapies and subsequently her second stroke in late 2021 resulted in her discharging to [address]. She felt that her therapies were not as effective or intensive as her outpatient therapies.   She reports her baseline level of occupational function, based on the above deficits associated with debility and recurrent. Pt has a chronic visual deficit. Pt would benefit from continued skilled acute OT services to address these deficits. Pt is progressing well, demo's increased ability to complete LB tasks and requiring less VCs to attend to activities. Continue POC    SLP  Cognitive Diagnosis: Pt continues to present with mild cognitive linguistic deficits. High accuracy with naming tasks but continues to complain of effortful word production. Higher level cognitive-linguistic tasks are limited due to visual deficits. Speech Diagnosis: Pt continues to present with slow speech production and hoarse vocal quality at baseline. Will continue to assess pt's speech sound production and treat as appropriate. Body mass index is 29.97 kg/m².     Assessment:  Patient Active Problem List   Diagnosis    Type 2 diabetes mellitus, with long-term current use of insulin ([facility])    Mixed hyperlipidemia    Migraine headache    Anemia    Diabetic foot infection ([address]    Pyogenic inflammation of bone ([address]    History of medication noncompliance    Osteomyelitis of left foot ([facility])    Nephrotic syndrome    Peripheral edema    Pulmonary edema    Right sided numbness    Tobacco dependence    Chronic kidney disease (CKD), stage III (moderate) (HCC)    Chronic diastolic (congestive) heart failure (HCC)    History of CVA (cerebrovascular accident)    Arterial ischemic stroke, ICA, left, acute ([address]    HTN (hypertension), benign    DM (diabetes mellitus), secondary, uncontrolled, w/neurologic complic ([address]    Dyslipidemia    Smoker    Panic disorder    Isolated proteinuria    Acute on chronic diastolic heart failure (HCC)    Diabetic peripheral neuropathy (HCC)    Acute respiratory failure (HCC)    Depression    Abnormal gait    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    Suspected COVID-19 virus infection    Epiglottitis    Recurrent falls       Plan:   Debility: PT/OT     Recurrent falls: PT/OT     ESRD on HD: MWF, Nephro following. AVF creation 2/10.      DM: lantus decreased to 17, SSI     DM neuropathy: lyrica 75     H/O CVA: resultant R hemiparesis per report but none significant on exam     HTN: norvasc 10, clonidine 0.2, lisinopril 40, spironolactone 50     HLD: Lipitor 40     Anx/Dep: wellbutrin , xanax PRN, resumed home luvox 100 HS    Staph epi UTI: - bactrim initiated, FU sensitivities. Hyperkalemia: per HD.     Bowels: Per protocol  Bladder: Per protocol   Sleep: Trazodone provided prn. Pain: tylenol, tramadol PRN   DVT PPx: Heparin    GILSON: 1/21    Kelly Glynn MD 1/17/2022, 10:31 AM    * This document was created using dictation software. While all precautions were taken to ensure accuracy, errors may have occurred. Please disregard any typographical errors.

## 2022-01-17 NOTE — PLAN OF CARE
Nutrition Problem #1: No nutrition diagnosis at this time  Intervention: Food and/or Nutrient Delivery: Continue Current Diet  Nutritional Goals: po intake greater than 50% of meals

## 2022-01-17 NOTE — PROGRESS NOTES
Physical Therapy  Facility/Department: [facility] ACUTE REHAB UNIT  Daily Treatment Note  NAME: Ariana Coats  : 1975  MRN: [id number]    Date of Service: 2022    Discharge Recommendations:  24 hour supervision or assist,Home with Home health PT   PT Equipment Recommendations  Equipment Needed: Yes  Mobility Devices: Belen Dragon: Rolling    Assessment   Body structures, Functions, Activity limitations: Decreased functional mobility ; Decreased posture;Decreased ADL status; Decreased endurance;Decreased sensation;Decreased strength;Decreased balance;Vestibular Impairment;Decreased safe awareness;Decreased ROM  Assessment: Pt continues to improve functional mobility and safety awareness. Requires frequent VC for widened ROMAINE when performing transfers. Displays decreased joint proprioception of the lower leg and foot requriring UE support or use of vision to complete tasks. D/t decreased proprioception she continues to demonstrate LOB posteriorly, along with posterior lean, when performing functional mobility. Decreased attention to task which requires VC for refocussing. She would benefit from continued skilled physical therapy to improve safety education and to decrease risk of falls. Treatment Diagnosis: impaired balance, impaired gait, generalized weakness  Prognosis: Good  PT Education: PT Role;Transfer Training;Functional Mobility Training;Plan of Care; Injury Prevention;Gait Training;Goals; General Safety  Patient Education: Patient verbalized understanding, would benefit from continued reinforcement  Barriers to Learning: visual deficits, cognitive deficits  REQUIRES PT FOLLOW UP: Yes  Activity Tolerance  Activity Tolerance: Pt required sitting breaks between activities d/t to weakness and decreased attention to task     Patient Diagnosis(es): Recurrent falls     has a past medical history of Blind in both eyes, Cerebral artery occlusion with cerebral infarction [facility]), CHF (congestive heart failure) ([facility] [address] Chronic kidney disease, Depression, Diabetes mellitus out of control ([facility] [address] Diabetes mellitus, type II ([facility] [address] Diabetic neuropathy associated with type 2 diabetes mellitus ([facility] [address] Generalized headaches, Hypertension, Infertility, Insomnia, Migraine headache, Mixed hyperlipidemia, Otitis media, Pelvic abscess in female, Pneumonia, Stroke [facility]), and Stroke ([facility] [address] has a past surgical history that includes Cervix surgery; eye surgery; Foot surgery (Right); Foot surgery (Bilateral); Foot surgery (Left); and IR TUNNELED CVC PLACE WO SQ PORT/PUMP > 5 YEARS (9/7/2021). Restrictions  Restrictions/Precautions  Restrictions/Precautions: Fall Risk  Required Braces or Orthoses?: No  Position Activity Restriction  Other position/activity restrictions: 55-year-old female with a history of ESRD on HD, diabetes, diabetic neuropathy, legal blindness, CVA with resultant right hemiparesis, HTN, and HLD who was admitted on 1/4 with recurrent falls. Due to increasing difficulty with ambulation and transfers, she had missed 2 dialysis sessions prior to admission. She was recently discharged on 12/29 for an admission with generalized weakness and falls consistent with this admission. She scored well enough with therapy evaluations to discharge to home however it does not appear she is able to care for herself with the assistance of her . She was evaluated by therapy and suggested to continue in an inpatient setting prior to returning home. Patient reports that she had her initial stroke in August and discharged to home with outpatient therapies and subsequently her second stroke in late 2021 resulted in her discharging to [address]. She felt that her therapies were not as effective or intensive as her outpatient therapies. She reports no current complaints. Subjective   General  Chart Reviewed:  Yes  Additional Pertinent Hx: Hx of CVA x 2 with multiple falls in home enviornment  Response To Previous Treatment: Patient with no complaints from previous session. Family / Caregiver Present: No  Referring Practitioner: Dr. Yanci Osborne  Subjective  Subjective: Pt agreebale to PT, reports this weekend she received well needed rest. States she is unable to \"feel\" feet at times. States at baseline for toilet or low chair transfers her  had to assist.  Pain Screening  Patient Currently in Pain: Denies  Vital Signs  Patient Currently in Pain: Denies     Objective   Bed mobility  Supine to Sit: Stand by assistance (HOB elevated)  Comment: Pt supine in bed upon arrival.  Transfers  Sit to Stand: Contact guard assistance (require multiple attempts to self complete due to posterior LOB during initial stance resulting in sit to EOB. Over reliance on UE for push-off)  Stand to sit: Contact guard assistance  Stand Pivot Transfers: Contact guard assistance  Comment: Inconsistent velocity and control throughout transfers. Ambulation  Ambulation?: Yes  Ambulation 1  Surface: level tile  Device: Rolling Walker  Assistance: Contact guard assistance  Quality of Gait: narrow ROMAINE, decreased time in SLS, decreased stride length, FWD flexed posture  Distance: short distance (max distance not attempted)  Comments: Requires VC for upright postue and wide ROMAINE before beginning ambulation  Stairs/Curb  Stairs?: Yes  Stairs  # Steps : 9  Stairs Height: 6\"  Rails: Bilateral  Assistance: Minimal assistance  Comment: step through pattern with hesitation and misstepping present. Poor eccentric control descending stairs. Required TC to facilitate weight shift and balance. Requires head down posture to utilize vision for foot placement. Balance  Posture: Fair  Sitting - Static: Fair;-  Sitting - Dynamic: Fair;-  Standing - Static: Poor;+  Standing - Dynamic: Poor;+  Comments: Increased postural sway noted during functional mobility with posterior LOB noted when pt in narrow ROMAINE, no visual input, and decreased attention to task.   Other exercises  Other exercises 5: Sit <=> stand performed @ W/C UE assistance required to complete task 1x4  Other exercises 10: step up (+) 6\" regressing to 4\" (B) HR 2x6ea         Comment: Functional mobility as noted above. Therapist assisted in dressing secondary to time constraints    2nd PT Session: Pt seated in recliner upon arrival. Sit<=>stand CGA with RW. Ambulation: 120' + 120' + short distances with RW at [facility], required VC for speed, stride length, and posture. RW height increased secondary to improved upright posture. Sit=>stand x5 elevated surface with UE support to improve LE strength. Sit<=>stand from elevated surface with UE support and single LE on 4\" box x5ea required Katrin-CGA for posterior lean and LOB. VC + TC required for balance and eccentric control (B). Pt educated on need for increased eccentric control with stand=>sit to decrease risk of falls. Pt returned to recliner with chair alarm on, call light in reach, and all needs met. Goals  Short term goals  Time Frame for Short term goals: 2 weeks  Short term goal 1: Pt to complete bed mobility at modified independent level with use of bed rail. Short term goal 2: Pt to complete transfers at [facility]  Short term goal 3: Pt to ambulate 150 ft with RW at [facility]  Short term goal 4: Pt to ascend/descend 4 steps with (B) HR at [facility]  Short term goal 5: Pt to complete car transfer at [facility]  Patient Goals   Patient goals : To reduce falling episodes    Plan    Plan  Times per week: 5x/week, 75 min/day  Times per day: Daily  Current Treatment Recommendations: Strengthening,Balance Training,Functional Mobility Training,Transfer Training,ADL/Self-care Training,Stair training,Endurance Training,Gait Training,Neuromuscular Re-education,Positioning,Modalities,Patient/Caregiver Education & Training,Safety Education & Training  Safety Devices  Type of devices:  All fall risk precautions in place,Call light within reach,Gait belt,Chair alarm in place,Left in chair  Restraints  Initially in place: No     Therapy Time   Individual Concurrent Group Co-treatment   Time In 0733         Time Out 0820         Minutes 47         Timed Code Treatment Minutes: 52 Minutes       Second Session Therapy Time:   Individual Concurrent Group Co-treatment   Time In 0987         Time Out 1150         Minutes 34           Timed Code Treatment Minutes:   34    Total Treatment Minutes:  52 + 29 = [id number] [facility], [facility]  Therapist observed and directed the patient's plan of care.   Co-signed and supervised by: Hussein Epperson PT, DPT - [id number]

## 2022-01-17 NOTE — PLAN OF CARE
Problem: Falls - Risk of:  Goal: Will remain free from falls  Description: Will remain free from falls  Outcome: Ongoing  Goal: Absence of physical injury  Description: Absence of physical injury  Outcome: Ongoing     Problem: IP BOWEL ELIMINATION  Goal: LTG - patient will have regular and routine bowel evacuation  Outcome: Ongoing  Goal: STG - Patient will verbalize signs and symptoms of constipation and how to prevent/alleviate  Outcome: Ongoing     Problem: IP BLADDER/VOIDING  Goal: STG - Patient will state signs and symptoms of UTI  Outcome: Ongoing     Problem: SKIN INTEGRITY  Goal: LTG - Patient will be free from infection  Outcome: Ongoing  Goal: STG - Patient demonstrates preventative skin care measures  Outcome: Ongoing     Problem: Airway Clearance - Ineffective  Goal: Achieve or maintain patent airway  Outcome: Ongoing     Problem: Gas Exchange - Impaired  Goal: Absence of hypoxia  Outcome: Ongoing  Goal: Promote optimal lung function  Outcome: Ongoing     Problem: Breathing Pattern - Ineffective  Goal: Ability to achieve and maintain a regular respiratory rate  Outcome: Ongoing     Problem:  Body Temperature -  Risk of, Imbalanced  Goal: Ability to maintain a body temperature within defined limits  Outcome: Ongoing  Goal: Will regain or maintain usual level of consciousness  Outcome: Ongoing  Goal: Complications related to the disease process, condition or treatment will be avoided or minimized  Outcome: Ongoing     Problem: Isolation Precautions - Risk of Spread of Infection  Goal: Prevent transmission of infection  Outcome: Ongoing     Problem: Nutrition Deficits  Goal: Optimize nutritional status  Outcome: Ongoing     Problem: Risk for Fluid Volume Deficit  Goal: Maintain normal heart rhythm  Outcome: Ongoing  Goal: Maintain absence of muscle cramping  Outcome: Ongoing  Goal: Maintain normal serum potassium, sodium, calcium, phosphorus, and pH  Outcome: Ongoing     Problem: Loneliness or Risk for Loneliness  Goal: Demonstrate positive use of time alone when socialization is not possible  Outcome: Ongoing     Problem: Fatigue  Goal: Verbalize increase energy and improved vitality  Outcome: Ongoing     Problem: Patient Education: Go to Patient Education Activity  Goal: Patient/Family Education  Outcome: Ongoing     Problem: Pain:  Goal: Pain level will decrease  Description: Pain level will decrease  Outcome: Ongoing  Goal: Control of acute pain  Description: Control of acute pain  Outcome: Ongoing  Goal: Control of chronic pain  Description: Control of chronic pain  Outcome: Ongoing     Problem: Skin Integrity:  Goal: Will show no infection signs and symptoms  Description: Will show no infection signs and symptoms  Outcome: Ongoing  Goal: Absence of new skin breakdown  Description: Absence of new skin breakdown  Outcome: Ongoing     Problem:  Activity:  Goal: Fatigue will decrease  Description: Fatigue will decrease  Outcome: Ongoing  Goal: Risk for activity intolerance will decrease  Description: Risk for activity intolerance will decrease  Outcome: Ongoing

## 2022-01-17 NOTE — PROGRESS NOTES
MD Ladan Plunkett MD Cathaleen Lacks, MD                Office: [phone]                      Fax: [phone]          Inpatient  Progress Note:     PATIENT NAME: Robert Arguelles  : 1975  MRN: [id number]         IMPRESSION / RECOMMENDATION:       Admitted for:  Recurrent falls [R29.6]    ESRD-tolerating dialysis well. HD MWF  Hyponatremia-follow  Metabolic acidosis-follow with HD  Anemia due to CKD-RIA with HD    Vital signs stable. BP lower limits. Stop Amlodipine    Continue renal diet    Medications reviewed. Monitor anemia levels. 1. ESRD on iHD: MWF ,  - follows w/ Dr. Shannon Chapa- [facility] dialysis center   - access: [facility]       - outpt HD center: [facility], Dr Shannon Chapa   - recently started HD in 2021, during admission w/ ATN w/ KINZA on CKD-4, was lost for f/u,), had acute hypoxic respiratory failure, pneumonia - needing intubation in past   - missed HD x2  - encouraged compliance as if her solutes are better control, she may feel overall better    - so needed HD on admission during inpatient stay  - now admitted to ARU for inpatient rehab    Labs MWF - f/u K   Added Low K diet  Decreased Lisinopril 40 -> 20 mg QD      EDW listed 88 (but per pt 85 kg)     2. Hypertension/Volume Status:+edema.  - BP HTN - hx of resistant HTN - slightly uncontrolled - f/u after HD + resume home meds   - EDW reported 88  -> 83 kg currently - so might be her new DW  - Na - chronic hypoNatremia - f/u w/ HD       3. Electrolytes/acid-base:  K: WNL  High AG metabolic acidosis: mild - f/u w/ HD      4. Anemia of renal failure: at goal  - RIA: w/ HD     5.  BMD:  - Phos: follow iPTH outpt         Other Problems:  Recurrent falls [R29.6]       Cortland Severs, MD  [facility] [address] [place]: [phone] or [employer]  Fax: [phone]     Subjective  Undergoing PT      Vitals:    22 0948   BP: 98/64   Pulse: 79 Resp: 18   Temp: 97.7 °F (36.5 °C)   SpO2: 97%     PE  Gen-appears well  HEENT-anicteric sclerae  Chest-clear  Heart-regular  Abd-soft  Ext-1+edema         Labs Reviewed  by me   Labs   Lab Results   Component Value Date    CREATININE 7.1 (HH) 01/17/2022    BUN 78 (H) 01/17/2022     (L) 01/17/2022    K 4.6 01/17/2022    CL 92 (L) 01/17/2022    CO2 19 (L) 01/17/2022     Lab Results   Component Value Date    WBC 11.8 (H) 01/17/2022    HGB 9.1 (L) 01/17/2022    HCT 27.9 (L) 01/17/2022    MCV 80.1 01/17/2022     01/17/2022       Dialysis Treatment and Prescription reviewed

## 2022-01-17 NOTE — PROGRESS NOTES
ACUTE REHAB UNIT  SPEECH/LANGUAGE PATHOLOGY      [x] Daily  [] Weekly Care Conference Note  [] Discharge    Patient:Kristine Pereira     QKM:[birth year]  HLR:[id number]  Room #: AGO-1447/2008-13   Rehab Dx/Hx: Recurrent falls [R29.6]  Date of Admit: 1/6/2022      Precautions: falls  Home situation: Lives at home with her ; Not an active ;  completes finances and places pills into pill organizer for pt)   ST Dx: Cognitive Linguistic Deficits     Daily Treatment Info:   Date: 1/17/2022     Tx session 1   Pain None reported    Subjective     Pt up in chair, pleasant and willing to participate throughout session. Objective:  Goals    Pt will complete word associative tasks to improve mental flexibility, complex thinking, and working memory skills with 90% accuracy. Working memory / word generation task (card suite / category representation task)   - generated words with 100% accuracy   - recalled category / card suite representation with 100% accuracy     Antonyms  - 93% accuracy    Sentence generation   - 100% accuracy      Pt will complete executive function tasks (i.e. planning, deductive reasoning, inferential, functional organization tasks) with 80% accuracy independently. Goal not targeted this session. Continue to monitor speech sound production with additional goals to be added as warranted. Pt with hoarse vocal quality but speech remained intelligible for structured conversation. Suspect fluctuation to correlate with fatigue / time of day and baseline voice deficits. Other areas targeted:    Education:   Provided education regarding rationale for tasks and plan of care. Pt verbalized understanding    Safety Devices: [x] Call light within reach  [x] Chair alarm activated  [] Bed alarm activated  [] Other:     Assessment:   Weekly Update:   Speech Therapy Diagnosis  Cognitive Diagnosis: Pt continues to present with mild cognitive linguistic deficits.  High accuracy with naming tasks but continues to complain of effortful word production. Higher level cognitive-linguistic tasks are limited due to visual deficits. Speech Diagnosis: Pt continues to present with slow speech production and hoarse vocal quality at baseline. Will continue to assess pt's speech sound production and treat as appropriate. Current Diet Order: ADULT DIET; Regular; 4 carb choices (60 gm/meal); Low Potassium (Less than 3000 mg/day)            Plan:     Frequency:  5days/week   30 minutes/day    Discharge Recommendations:   Barriers: Visual deficits (legally blind)   Discharge Recommendations:  [] Home independently  [x] Home with assistance []  24 hour supervision  [] SNF [] Other:  Continued SLP Treatment:  [] Yes [] No [x] TBD based on progress while on ARU [] Vital Stim indicated [] Other:   Estimated discharge date: 1/21/2022     Session 1 Times: Individual Concurrent Group Co-treatment   Time In 0830         Time Out 0900         Minutes 30         Time Code Minutes  30        Timed Code Treatment Minutes: 30 Minutes     Total Treatment Minutes: 30    Electronically Signed by     Suzi Portillo M.A. [facility] MARJAN [id number]  Speech-Language Pathologist   1/17/2022 9:00 AM     The speech-language pathologist was present, directed the patient's care, made skilled judgment and was responsible for assessment and treatment.     Fabrice PRICE,  Speech-Language Pathology

## 2022-01-17 NOTE — PROGRESS NOTES
Occupational Therapy  Facility/Department: [facility] ACUTE REHAB UNIT  Daily Treatment Note  NAME: Celina Elena  : 1975  MRN: [id number]    Date of Service: 2022    Discharge Recommendations:  Home with Home health OT,24 hour supervision or assist,S Level 3  OT Equipment Recommendations  Equipment Needed: No  Other: owns grab bars, shower chair    Assessment   Performance deficits / Impairments: Decreased functional mobility ; Decreased ADL status; Decreased endurance;Decreased balance;Decreased vision/visual deficit; Decreased safe awareness  Assessment: Pt is well below her baseline level of occupational function, based on the above deficits associated with debility and recurrent. Pt has a chronic visual deficit. Pt would benefit from continued skilled acute OT services to address these deficits. Pt is progressing well, demo's increased ability to complete LB tasks and requiring less VCs to attend to activities. Continue POC  Treatment Diagnosis: Decreased ADL status, functional mobility, endurance, balance, and safety awareness associated with ESRD, debility,and recurrent falls  OT Education: OT Role;Plan of Care;ADL Adaptive Strategies;Transfer Training;Energy Conservation  Patient Education: Patient verbalized understanding, reinforcement for increased carryover  Barriers to Learning: Visual  REQUIRES OT FOLLOW UP: Yes  Activity Tolerance  Activity Tolerance: Patient Tolerated treatment well  Activity Tolerance: Patient reported balance difficulties this date stating she felt off, patient with 2 LOB and with R lateral and posterior lean in stance. However patient tolerated well. Safety Devices  Safety Devices in place: Yes  Type of devices: Nurse notified;Call light within reach; Chair alarm in place; Left in chair         Patient Diagnosis(es): Recurrent falls      has a past medical history of Blind in both eyes, Cerebral artery occlusion with cerebral infarction [facility]), CHF (congestive heart failure) ([facility] [address] Chronic kidney disease, Depression, Diabetes mellitus out of control ([facility] [address] Diabetes mellitus, type II ([facility] [address] Diabetic neuropathy associated with type 2 diabetes mellitus ([facility] [address] Generalized headaches, Hypertension, Infertility, Insomnia, Migraine headache, Mixed hyperlipidemia, Otitis media, Pelvic abscess in female, Pneumonia, Stroke [facility]), and Stroke ([facility] [address] has a past surgical history that includes Cervix surgery; eye surgery; Foot surgery (Right); Foot surgery (Bilateral); Foot surgery (Left); and IR TUNNELED CVC PLACE WO SQ PORT/PUMP > 5 YEARS (9/7/2021). Restrictions  Restrictions/Precautions  Restrictions/Precautions: Fall Risk  Required Braces or Orthoses?: No  Position Activity Restriction  Other position/activity restrictions: 80-year-old female with a history of ESRD on HD, diabetes, diabetic neuropathy, legal blindness, CVA with resultant right hemiparesis, HTN, and HLD who was admitted on 1/4 with recurrent falls. Due to increasing difficulty with ambulation and transfers, she had missed 2 dialysis sessions prior to admission. She was recently discharged on 12/29 for an admission with generalized weakness and falls consistent with this admission. She scored well enough with therapy evaluations to discharge to home however it does not appear she is able to care for herself with the assistance of her . She was evaluated by therapy and suggested to continue in an inpatient setting prior to returning home. Patient reports that she had her initial stroke in August and discharged to home with outpatient therapies and subsequently her second stroke in late 2021 resulted in her discharging to [address]. She felt that her therapies were not as effective or intensive as her outpatient therapies. She reports no current complaints.     Subjective   General  Chart Reviewed: Yes  Patient assessed for rehabilitation services?: Yes  Response to previous treatment: Patient with no complaints from previous session  Family / Caregiver Present: No  Diagnosis: Debility, Recurrent Falls  Subjective  Subjective: Pt seated i recliner upon entry, agreeable to OT  Vital Signs  Patient Currently in Pain: No     Objective    ADL  Grooming: Contact guard assistance;Setup  UE Bathing: Setup;Verbal cueing;Contact guard assistance  LE Bathing: Setup;Verbal cueing;Contact guard assistance  UE Dressing: Setup;Verbal cueing;Contact guard assistance  Additional Comments: Pt requesting to complete sponge bathing at sink. Pt completed UB/LB bathing and dressing alternating between sitting/standing. Intermittent posterior LOB that were self corrected. Pt completed oral care in stance at sink. Declines additional ADLs at this time     Balance  Sitting Balance: Independent  Standing Balance: Contact guard assistance  Standing Balance  Time: ~10-15 minutes  Activity: Functional transfers, functional mobility, ADL completion  Functional Mobility  Activity: To/from bathroom  Assist Level: Contact guard assistance     Transfers  Sit to stand: Contact guard assistance  Stand to sit: Contact guard assistance      Cognition  Overall Cognitive Status: Exceptions  Arousal/Alertness: Appropriate responses to stimuli  Following Commands: Follows one step commands with increased time; Follows one step commands with repetition  Attention Span: Attends with cues to redirect; Difficulty dividing attention  Memory: Appears intact  Safety Judgement: Decreased awareness of need for assistance;Decreased awareness of need for safety  Problem Solving: Assistance required to generate solutions;Assistance required to implement solutions  Insights: Decreased awareness of deficits  Initiation: Requires cues for some  Sequencing: Requires cues for some        Second Session:  Pt seated in recliner at entry, agreeable to session, denied pain- requesting to brush hair/fix at sink- pt ambulated with RW at [facility] to bathroom- pt sat to fix hair d/t fatigue- increased time, Dalton to complete. Dynamic balance and UB/core strengthening completed in stance using ladder- pt completed 7# ball taps to ladder rungs up/down x2, VCs for posture and midline movements/reaching, pt then completed trunk rotations lifting/placing ladder- 4 reps (L<>middle<>R)- Dalton to lifting progressing to CGA, up to Dalton for balance throughout activities, 1 seated rest break required, pt able to self-correct posture 50% of the time. Pt requesting to lay in bed at EOS-shoes doffed EOB at [facility], sit>supine at SBA with increased time. Pt left in bed, bed alarm on, call light and needs in reach. Plan   Plan  Times per week: 75 minutes 5 days per week  Times per day: Daily  Current Treatment Recommendations: Safety Education & Training,Self-Care / [employer] Mobility Training,Balance Training    Goals  Short term goals  Time Frame for Short term goals: 2 weeks  Short term goal 1: SBA functional transfers to toilet and shower-- not met, progressing  Short term goal 2: SBA UB ADLs-- not met, progressing  Short term goal 3: min A LB ADLs-- CGA 1/14 for socks and shoes  Short term goal 4: CGA functional mobility for ADLs-- not met, progressing  Patient Goals   Patient goals : to get stronger, stop falling       Therapy Time   Individual Concurrent Group Co-treatment   Time In 0900         Time Out 0945         Minutes 45              Timed Code Treatment Minutes:  45     Total Treatment Minutes:  110 [facility], OT   [employer] OTR/L, 116 [facility] #[id number]      Second Session Therapy Time:   Individual Concurrent Group Co-treatment   Time In 1836        Time Out 3157        Minutes 30          Timed Code Treatment Minutes:  45 + 30     Total Treatment Minutes:  200 [facility], OTR/L #[id number] (documentation and Tx for 2nd session)       .

## 2022-01-17 NOTE — PLAN OF CARE
Discussed w/ Dr. Nikita Garibay & sent perfect serve to Dr. She Fishman about what meds were held & her low Bp. Dialysis after therapy complete    Problem: Falls - Risk of:  Goal: Will remain free from falls  Description: Will remain free from falls  Outcome: Ongoing  Note: Education Provided as follows:  Family/Patient made aware of the tailored interventions falls plan using the TIPS TOOL. Goal: Absence of physical injury  Description: Absence of physical injury  Outcome: Ongoing     Problem: Nutrition  Goal: Optimal nutrition therapy  Outcome: Ongoing     Problem: IP BOWEL ELIMINATION  Goal: LTG - patient will have regular and routine bowel evacuation  Outcome: Ongoing  Goal: STG - Patient will verbalize signs and symptoms of constipation and how to prevent/alleviate  Outcome: Ongoing     Problem: IP BLADDER/VOIDING  Goal: STG - Patient will state signs and symptoms of UTI  Outcome: Ongoing     Problem: SKIN INTEGRITY  Goal: LTG - Patient will be free from infection  Outcome: Ongoing  Goal: STG - Patient demonstrates preventative skin care measures  Outcome: Ongoing     Problem: Airway Clearance - Ineffective  Goal: Achieve or maintain patent airway  Outcome: Ongoing     Problem: Gas Exchange - Impaired  Goal: Absence of hypoxia  Outcome: Ongoing  Goal: Promote optimal lung function  Outcome: Ongoing     Problem: Breathing Pattern - Ineffective  Goal: Ability to achieve and maintain a regular respiratory rate  Outcome: Ongoing     Problem:  Body Temperature -  Risk of, Imbalanced  Goal: Ability to maintain a body temperature within defined limits  Outcome: Ongoing  Goal: Will regain or maintain usual level of consciousness  Outcome: Ongoing  Goal: Complications related to the disease process, condition or treatment will be avoided or minimized  Outcome: Ongoing     Problem: Isolation Precautions - Risk of Spread of Infection  Goal: Prevent transmission of infection  Outcome: Ongoing     Problem: Nutrition Deficits  Goal: Optimize nutritional status  Outcome: Ongoing     Problem: Risk for Fluid Volume Deficit  Goal: Maintain normal heart rhythm  Outcome: Ongoing  Goal: Maintain absence of muscle cramping  Outcome: Ongoing  Goal: Maintain normal serum potassium, sodium, calcium, phosphorus, and pH  Outcome: Ongoing     Problem: Loneliness or Risk for Loneliness  Goal: Demonstrate positive use of time alone when socialization is not possible  Outcome: Ongoing     Problem: Fatigue  Goal: Verbalize increase energy and improved vitality  Outcome: Ongoing     Problem: Patient Education: Go to Patient Education Activity  Goal: Patient/Family Education  Outcome: Ongoing     Problem: Pain:  Description: Pain management should include both nonpharmacologic and pharmacologic interventions. Goal: Pain level will decrease  Description: Pain level will decrease  Outcome: Ongoing  Goal: Control of acute pain  Description: Control of acute pain  Outcome: Ongoing  Goal: Control of chronic pain  Description: Control of chronic pain  Outcome: Ongoing     Problem: Skin Integrity:  Goal: Will show no infection signs and symptoms  Description: Will show no infection signs and symptoms  Outcome: Ongoing  Goal: Absence of new skin breakdown  Description: Absence of new skin breakdown  Outcome: Ongoing     Problem:  Activity:  Goal: Fatigue will decrease  Description: Fatigue will decrease  Outcome: Ongoing  Goal: Risk for activity intolerance will decrease  Description: Risk for activity intolerance will decrease  Outcome: Ongoing

## 2022-01-17 NOTE — PROGRESS NOTES
Assessment complete see flowsheets. A&Ox4. Meds given per eMAR. Pain managed per eMAR. The care plan and education has been reviewed and mutually agreed upon with the patient. In bed, fall precautions in place, hourly rounding, call light and belongings in reach, bed in lowest position, wheels locked in place, side rails up x 3, walkways free of clutter. No further needs expressed at this time.

## 2022-01-17 NOTE — PROGRESS NOTES
Comprehensive Nutrition Assessment    Type and Reason for Visit:  Reassess    Nutrition Recommendations/Plan:   1. Continue Carb control diet low potassium  2. Will monitor nutritional adequacy, nutrition-related labs, weights, BMs, and clinical progress     Nutrition Assessment:  Follow up:  Patient reported no nutrition concerns at this time. Appetite stable. Dialysis today. Continues on carb control low potassium diet eating % meals. Will continue to monitor. Malnutrition Assessment:  Malnutrition Status:  No malnutrition      Nutrition Related Findings:  Trace BLE edema; LBM 1/6      Wounds:  None       Current Nutrition Therapies:    ADULT DIET; Regular; 4 carb choices (60 gm/meal); Low Potassium (Less than 3000 mg/day)    Anthropometric Measures:  · Height: 5' 7\" (170.2 cm)  · Current Body Weight: 175 lb (79.4 kg)   · Admission Body Weight:      · Usual Body Weight: 178 lb (80.7 kg) (11/21)     · Ideal Body Weight: 135 lbs; % Ideal Body Weight 129.6 %   · BMI: 27.4  · Adjusted Body Weight:  ; No Adjustment   · Adjusted BMI:      · BMI Categories: Overweight (BMI 25.0-29. 9)       Nutrition Diagnosis:   · No nutrition diagnosis at this time related to   as evidenced by      Nutrition Interventions:   Food and/or Nutrient Delivery:  Continue Current Diet  Nutrition Education/Counseling:  Education not indicated   Coordination of Nutrition Care:  Continue to monitor while inpatient    Goals:  po intake greater than 50% of meals       Nutrition Monitoring and Evaluation:   Behavioral-Environmental Outcomes:  None Identified   Food/Nutrient Intake Outcomes:  Food and Nutrient Intake  Physical Signs/Symptoms Outcomes:  Weight     Discharge Planning:     Too soon to determine     Electronically signed by Nathalie Cruz RD, LD on 1/17/22 at 1:15 PM EST    Contact: [phone]

## 2022-01-17 NOTE — PROGRESS NOTES
Seen on HD  Tolerating fluid removal challenge  Will use post HD weight today as new EDW. Outpt HD unit will be informed  Discussed with HD nurse.

## 2022-01-18 LAB
GLUCOSE BLD-MCNC: 111 MG/DL (ref 70–99)
GLUCOSE BLD-MCNC: 152 MG/DL (ref 70–99)
GLUCOSE BLD-MCNC: 154 MG/DL (ref 70–99)
GLUCOSE BLD-MCNC: 159 MG/DL (ref 70–99)
PERFORMED ON: ABNORMAL

## 2022-01-18 PROCEDURE — 97116 GAIT TRAINING THERAPY: CPT

## 2022-01-18 PROCEDURE — 97130 THER IVNTJ EA ADDL 15 MIN: CPT

## 2022-01-18 PROCEDURE — 97530 THERAPEUTIC ACTIVITIES: CPT

## 2022-01-18 PROCEDURE — 97535 SELF CARE MNGMENT TRAINING: CPT

## 2022-01-18 PROCEDURE — 6360000002 HC RX W HCPCS: Performed by: PHYSICAL MEDICINE & REHABILITATION

## 2022-01-18 PROCEDURE — 6370000000 HC RX 637 (ALT 250 FOR IP): Performed by: PHYSICAL MEDICINE & REHABILITATION

## 2022-01-18 PROCEDURE — 97129 THER IVNTJ 1ST 15 MIN: CPT

## 2022-01-18 PROCEDURE — 6370000000 HC RX 637 (ALT 250 FOR IP): Performed by: INTERNAL MEDICINE

## 2022-01-18 PROCEDURE — 1280000000 HC REHAB R&B

## 2022-01-18 RX ADMIN — HEPARIN SODIUM 5000 UNITS: 5000 INJECTION INTRAVENOUS; SUBCUTANEOUS at 15:41

## 2022-01-18 RX ADMIN — ASPIRIN 81 MG: 81 TABLET, COATED ORAL at 08:23

## 2022-01-18 RX ADMIN — ATORVASTATIN CALCIUM 40 MG: 40 TABLET, FILM COATED ORAL at 21:04

## 2022-01-18 RX ADMIN — SULFAMETHOXAZOLE AND TRIMETHOPRIM 1 TABLET: 800; 160 TABLET ORAL at 08:23

## 2022-01-18 RX ADMIN — INSULIN LISPRO 2 UNITS: 100 INJECTION, SOLUTION INTRAVENOUS; SUBCUTANEOUS at 18:21

## 2022-01-18 RX ADMIN — FLUVOXAMINE MALEATE 100 MG: 50 TABLET, COATED ORAL at 21:08

## 2022-01-18 RX ADMIN — ALPRAZOLAM 0.75 MG: 0.5 TABLET ORAL at 21:58

## 2022-01-18 RX ADMIN — INSULIN LISPRO 2 UNITS: 100 INJECTION, SOLUTION INTRAVENOUS; SUBCUTANEOUS at 11:43

## 2022-01-18 RX ADMIN — ALPRAZOLAM 0.75 MG: 0.5 TABLET ORAL at 15:40

## 2022-01-18 RX ADMIN — ALPRAZOLAM 0.75 MG: 0.5 TABLET ORAL at 08:23

## 2022-01-18 RX ADMIN — PREGABALIN 75 MG: 75 CAPSULE ORAL at 21:04

## 2022-01-18 RX ADMIN — PROPRANOLOL HYDROCHLORIDE 80 MG: 80 CAPSULE, EXTENDED RELEASE ORAL at 08:23

## 2022-01-18 RX ADMIN — BENZONATATE 200 MG: 100 CAPSULE ORAL at 05:23

## 2022-01-18 RX ADMIN — BUPROPION HYDROCHLORIDE 150 MG: 150 TABLET, EXTENDED RELEASE ORAL at 08:24

## 2022-01-18 RX ADMIN — HEPARIN SODIUM 5000 UNITS: 5000 INJECTION INTRAVENOUS; SUBCUTANEOUS at 05:33

## 2022-01-18 RX ADMIN — INSULIN LISPRO 1 UNITS: 100 INJECTION, SOLUTION INTRAVENOUS; SUBCUTANEOUS at 21:09

## 2022-01-18 RX ADMIN — BENZONATATE 200 MG: 100 CAPSULE ORAL at 21:58

## 2022-01-18 RX ADMIN — HEPARIN SODIUM 5000 UNITS: 5000 INJECTION INTRAVENOUS; SUBCUTANEOUS at 21:04

## 2022-01-18 RX ADMIN — LISINOPRIL 10 MG: 10 TABLET ORAL at 08:24

## 2022-01-18 RX ADMIN — INSULIN GLARGINE 17 UNITS: 100 INJECTION, SOLUTION SUBCUTANEOUS at 09:33

## 2022-01-18 ASSESSMENT — PAIN DESCRIPTION - PROGRESSION
CLINICAL_PROGRESSION: NOT CHANGED

## 2022-01-18 ASSESSMENT — PAIN SCALES - GENERAL
PAINLEVEL_OUTOF10: 3
PAINLEVEL_OUTOF10: 2
PAINLEVEL_OUTOF10: 0
PAINLEVEL_OUTOF10: 3

## 2022-01-18 ASSESSMENT — PAIN DESCRIPTION - ONSET
ONSET: ON-GOING
ONSET: ON-GOING

## 2022-01-18 ASSESSMENT — PAIN DESCRIPTION - ORIENTATION: ORIENTATION: LOWER

## 2022-01-18 ASSESSMENT — PAIN DESCRIPTION - PAIN TYPE
TYPE: ACUTE PAIN
TYPE: ACUTE PAIN

## 2022-01-18 ASSESSMENT — PAIN DESCRIPTION - DESCRIPTORS
DESCRIPTORS: ACHING
DESCRIPTORS: ACHING

## 2022-01-18 ASSESSMENT — PAIN DESCRIPTION - FREQUENCY
FREQUENCY: INTERMITTENT
FREQUENCY: INTERMITTENT

## 2022-01-18 ASSESSMENT — PAIN DESCRIPTION - LOCATION
LOCATION: BACK
LOCATION: BACK

## 2022-01-18 ASSESSMENT — PAIN - FUNCTIONAL ASSESSMENT: PAIN_FUNCTIONAL_ASSESSMENT: ACTIVITIES ARE NOT PREVENTED

## 2022-01-18 NOTE — PROGRESS NOTES
Occupational Therapy  Facility/Department: [facility] ACUTE REHAB UNIT  Daily Treatment Note  NAME: Shane Mishra  : 1975  MRN: [id number]    Date of Service: 2022    Discharge Recommendations:  Home with Home health OT,24 hour supervision or assist,S Level 3  OT Equipment Recommendations  Equipment Needed: Yes  Other: walker tray and/or basket for increased safety during item transportation; continue to further update/assess pending progress    Assessment   Performance deficits / Impairments: Decreased functional mobility ; Decreased ADL status; Decreased endurance;Decreased balance;Decreased vision/visual deficit; Decreased safe awareness  Assessment: Pt is well below her baseline level of occupational function, based on the above deficits associated with debility and recurrent. Pt has a chronic visual deficit. Pt would benefit from continued skilled acute OT services to address these deficits. Pt is progressing well, demo's increased ability to complete LB tasks and requiring less VCs to attend to activities. pt continues to demo intermittent LOB however transfers and mobility abilities are progressing. Continue POC  Treatment Diagnosis: Decreased ADL status, functional mobility, endurance, balance, and safety awareness associated with ESRD, debility,and recurrent falls  Prognosis: Good  OT Education: OT Role;Plan of Care;ADL Adaptive Strategies;Transfer Training  Patient Education: Patient verbalized understanding, reinforcement for increased carryover  Barriers to Learning: Visual  REQUIRES OT FOLLOW UP: Yes  Activity Tolerance  Activity Tolerance: Patient Tolerated treatment well  Safety Devices  Safety Devices in place: Yes  Type of devices: Left in chair; All fall risk precautions in place;Call light within reach; Chair alarm in place         Patient Diagnosis(es): debility      has a past medical history of Blind in both eyes, Cerebral artery occlusion with cerebral infarction [facility]), CHF (congestive heart failure) ([facility] [address] Chronic kidney disease, Depression, Diabetes mellitus out of control ([facility] [address] Diabetes mellitus, type II ([facility] [address] Diabetic neuropathy associated with type 2 diabetes mellitus ([facility] [address] Generalized headaches, Hypertension, Infertility, Insomnia, Migraine headache, Mixed hyperlipidemia, Otitis media, Pelvic abscess in female, Pneumonia, Stroke [facility]), and Stroke ([facility] [address] has a past surgical history that includes Cervix surgery; eye surgery; Foot surgery (Right); Foot surgery (Bilateral); Foot surgery (Left); and IR TUNNELED CVC PLACE WO SQ PORT/PUMP > 5 YEARS (9/7/2021). Restrictions  Restrictions/Precautions  Restrictions/Precautions: Fall Risk  Required Braces or Orthoses?: No  Position Activity Restriction  Other position/activity restrictions: 66-year-old female with a history of ESRD on HD, diabetes, diabetic neuropathy, legal blindness, CVA with resultant right hemiparesis, HTN, and HLD who was admitted on 1/4 with recurrent falls. Due to increasing difficulty with ambulation and transfers, she had missed 2 dialysis sessions prior to admission. She was recently discharged on 12/29 for an admission with generalized weakness and falls consistent with this admission. She scored well enough with therapy evaluations to discharge to home however it does not appear she is able to care for herself with the assistance of her . She was evaluated by therapy and suggested to continue in an inpatient setting prior to returning home. Patient reports that she had her initial stroke in August and discharged to home with outpatient therapies and subsequently her second stroke in late 2021 resulted in her discharging to [address]. She felt that her therapies were not as effective or intensive as her outpatient therapies. She reports no current complaints.     Subjective   General  Chart Reviewed: Yes  Patient assessed for rehabilitation services?: Yes  Response to previous treatment: Patient with no complaints from previous session  Family / Caregiver Present: No  Diagnosis: Debility, Recurrent Falls  Subjective  Subjective: Pt in bed at entry, agreeable to OT session  Vital Signs  Patient Currently in Pain: Denies          Objective    ADL  Grooming: Setup;Contact guard assistance (CGA/SBA for oral care and face washing in stance at sink, SBA/SUP for seated hair brushing- increased time required for all)  UE Dressing: Setup;Stand by assistance (gown doffed and sweatshirt donned seated EOB)  LE Dressing: Contact guard assistance (SBA/CGA for seated donning of socks/shoes and threading LEs; CGA for balance for clothing over hips in stance)  Additional Comments: Pt completed EOB dressing with stance up to RW for clothing management over hips (pt with posterior lean- difficulty self-correcting)- post dressing pt ambulated to bathroom for grooming tasks           Balance  Sitting Balance:  (CGA progressing to SUP seated EOB)  Standing Balance: Minimal assistance (Katrin progressing to CGA)  Standing Balance  Time: 10-15min total  Activity: Functional transfers, functional mobility, ADL completion, TS in stance    Functional Mobility  Functional - Mobility Device: Rolling Walker  Activity: To/from bathroom; Other  Assist Level: Contact guard assistance  Functional Mobility Comments: within room + lap around unit, 200+ft    Bed mobility  Supine to Sit: Stand by assistance  Scooting: Stand by assistance  Comment: HOb slightly elevated     Transfers  Sit to stand: Contact guard assistance;Stand by assistance  Stand to sit: Contact guard assistance;Stand by assistance         Cognition  Overall Cognitive Status: Exceptions  Arousal/Alertness: Appropriate responses to stimuli  Following Commands: Follows one step commands with increased time; Follows one step commands consistently  Attention Span: Appears intact; Attends with cues to redirect  Memory: Appears intact  Insights: Decreased awareness of deficits  Initiation: Requires cues for some  Sequencing: Requires cues for some                       Type of ROM/Therapeutic Exercise  Type of ROM/Therapeutic Exercise: AROM; Resistive Bands  Comment: UE TE in stance to challenge balance and strength: 10 reps x1 of rows and horizontal abduction- continuous stance, VCs for slow/controled movements, CGA for balance         Second Session: Pt seated in recliner upon entry, pleasant and agreeable to OT. Pt denies pain, declines ADLs at this time. Pt educated on purchase/use of walker tray/basket. Pt ambulated ~15ft in room and picked up 5 objects (repeated w/ walker basket then tray). Completed at [facility]. Pt reporting that she prefers walker basket. Educated pt on safety when transporting items (using cups/bowls w/ lids)-pt verbalized understanding. Pt completed the following UE TE w/ 2lb free weights in order to address functional activity tolerance: elbow flexion/extension x15 reps, single arm punch outs x15 reps, internal/external rotation x15 reps. Pt seated in recliner at EOS, chair alarm on, needs within reach.             Plan   Plan  Times per week: 75 minutes 5 days per week  Times per day: Daily  Current Treatment Recommendations: Safety Education & Training,Self-Care / Karla Dunlap Mobility Training,Balance Training,Patient/Caregiver Education & Training,Equipment Evaluation, Education, & procurement             Goals  Short term goals  Time Frame for Short term goals: 2 weeks  Short term goal 1: SBA functional transfers to toilet and shower-- not met, progressing  Short term goal 2: SBA UB ADLs-- not met, progressing  Short term goal 3: min A LB ADLs-- CGA 1/14 for socks and shoes, goal met 1/18  Short term goal 4: CGA functional mobility for ADLs-- goal met 1/18  Short term goal 5: NEW GOAL: SUP for LB ADLs  Short term goal 6: NEW GOAL: SUP for functional mobility for ADL completion  Patient Goals   Patient goals : to get stronger, stop falling       Therapy Time   Individual Concurrent Group Co-treatment   Time In 0730         Time Out 0815         Minutes 45         Timed Code Treatment Minutes: 45 Minutes    Total Treatment Minutes:  [address] [place], NIALL Wolf OTR/L #[id number]    Second Session Therapy Time:   Individual Concurrent Group Co-treatment   Time In 1030      Time Out 1100      Minutes 30         Timed Code Treatment Minutes:  45 + 30     Total Treatment Minutes:  75 minutes     Jannet JAQUEZ/ISRAEL SHANNON #[id number]

## 2022-01-18 NOTE — PLAN OF CARE
Problem: Pain:  Description: Pain management should include both nonpharmacologic and pharmacologic interventions. Goal: Pain level will decrease  Description: Pain level will decrease  1/18/2022 1414 by Josiah Galindo RN  Outcome: Met This Shift     Problem: Pain:  Description: Pain management should include both nonpharmacologic and pharmacologic interventions. Goal: Control of acute pain  Description: Control of acute pain  1/18/2022 1414 by Josiah Galindo RN  Outcome: Met This Shift     Problem: Pain:  Description: Pain management should include both nonpharmacologic and pharmacologic interventions. Goal: Control of chronic pain  Description: Control of chronic pain  1/18/2022 1414 by Josiah Galindo RN  Outcome: Met This Shift     Problem: Skin Integrity:  Goal: Will show no infection signs and symptoms  Description: Will show no infection signs and symptoms  1/18/2022 1414 by Josiah Galindo RN  Outcome: Met This Shift     Problem: Skin Integrity:  Goal: Absence of new skin breakdown  Description: Absence of new skin breakdown  1/18/2022 1414 by Josiah Galindo RN  Outcome: Met This Shift     Problem: Falls - Risk of:  Goal: Will remain free from falls  Description: Will remain free from falls  1/18/2022 1414 by Josiah Galindo RN  Outcome: Ongoing     Problem: Nutrition  Goal: Optimal nutrition therapy  1/18/2022 1414 by Josiah Galindo RN  Outcome: Ongoing     Problem: IP BOWEL ELIMINATION  Goal: LTG - patient will have regular and routine bowel evacuation  1/18/2022 1414 by Josiah Galindo RN  Outcome: Ongoing     Problem: IP BLADDER/VOIDING  Goal: STG - Patient will state signs and symptoms of UTI  1/18/2022 1414 by Josiah Galindo RN  Outcome: Ongoing     Problem:  Activity:  Goal: Fatigue will decrease  Description: Fatigue will decrease  1/18/2022 1414 by Josiah Galindo RN  Outcome: Ongoing     Problem: Airway Clearance - Ineffective  Goal: Achieve or maintain patent airway  1/18/2022 1414 by Josiah Galindo RN  Outcome: Completed Problem: Gas Exchange - Impaired  Goal: Absence of hypoxia  1/18/2022 1414 by Oniel Galindo RN  Outcome: Completed     Problem: Gas Exchange - Impaired  Goal: Promote optimal lung function  1/18/2022 1414 by Oniel Galindo RN  Outcome: Completed     Problem: Breathing Pattern - Ineffective  Goal: Ability to achieve and maintain a regular respiratory rate  1/18/2022 1414 by Oniel Galindo RN  Outcome: Completed     Problem: Body Temperature -  Risk of, Imbalanced  Goal: Ability to maintain a body temperature within defined limits  1/18/2022 1414 by Oniel Galindo RN  Outcome: Completed     Problem: Body Temperature -  Risk of, Imbalanced  Goal: Will regain or maintain usual level of consciousness  1/18/2022 1414 by Oniel Galindo RN  Outcome: Completed     Problem:  Body Temperature -  Risk of, Imbalanced  Goal: Complications related to the disease process, condition or treatment will be avoided or minimized  1/18/2022 1414 by Oniel Galindo RN  Outcome: Completed     Problem: Isolation Precautions - Risk of Spread of Infection  Goal: Prevent transmission of infection  1/18/2022 1414 by Oniel Galindo RN  Outcome: Completed     Problem: Nutrition Deficits  Goal: Optimize nutritional status  1/18/2022 1414 by Oniel Galindo RN  Outcome: Completed     Problem: Risk for Fluid Volume Deficit  Goal: Maintain normal heart rhythm  1/18/2022 1414 by Oniel Galindo RN  Outcome: Completed     Problem: Risk for Fluid Volume Deficit  Goal: Maintain absence of muscle cramping  1/18/2022 1414 by Oniel Galindo RN  Outcome: Completed     Problem: Risk for Fluid Volume Deficit  Goal: Maintain normal serum potassium, sodium, calcium, phosphorus, and pH  1/18/2022 1414 by Oniel Galindo RN  Outcome: Completed     Problem: Loneliness or Risk for Loneliness  Goal: Demonstrate positive use of time alone when socialization is not possible  1/18/2022 1414 by Oniel Galindo RN  Outcome: Completed     Problem: Fatigue  Goal: Verbalize increase energy and improved vitality  1/18/2022 1414 by Cayetano Galindo RN  Outcome: Completed     Problem: Patient Education: Go to Patient Education Activity  Goal: Patient/Family Education  1/18/2022 1414 by Cayetano Galindo RN  Outcome: Completed

## 2022-01-18 NOTE — PROGRESS NOTES
Physical Therapy  Facility/Department: [facility] ACUTE REHAB UNIT  Daily Treatment Note  NAME: Daly Biggs  : 1975  MRN: [id number]    Date of Service: 2022    Discharge Recommendations:  24 hour supervision or assist,Home with Home health PT   PT Equipment Recommendations  Equipment Needed: Yes  Walker: Rolling    Assessment   Body structures, Functions, Activity limitations: Decreased functional mobility ; Decreased posture;Decreased ADL status; Decreased endurance;Decreased sensation;Decreased strength;Decreased balance;Vestibular Impairment;Decreased safe awareness;Decreased ROM  Assessment: Pt now independent with components of bed mobility but continues to require SBA for sit=>supine and Min A for lateral scooting. Pt able to complete curb step with RW and overall less physical assistance. Pt continues to heavily rely on UE support during functional mobility tasks, with need for physical assist to correct balance during unsupported functional mobility. Pt continues to require intermittent cueing to remain focused on tasks secondary to ongoing high distractability. She would continue to benefit from skilled physical therapy to reinforce safety techniques and improve balance to decrease fall risk. Treatment Diagnosis: impaired balance, impaired gait, generalized weakness  Prognosis: Good  PT Education: PT Role;Transfer Training;Functional Mobility Training;Plan of Care; Injury Prevention;Gait Training;Goals; General Safety; Disease Specific Education  Patient Education: Patient verbalized understanding, would benefit from continued reinforcement  Barriers to Learning: visual deficits, cognitive deficits  REQUIRES PT FOLLOW UP: Yes  Activity Tolerance  Activity Tolerance: Patient Tolerated treatment well     Patient Diagnosis(es): Recurrent Falls   has a past medical history of Blind in both eyes, Cerebral artery occlusion with cerebral infarction [facility]), [facility] Chronic kidney disease, Depression, Diabetes mellitus out of control ([facility] Diabetes mellitus, type II ([facility] Diabetic neuropathy associated with type 2 diabetes mellitus ([facility] Generalized headaches, Hypertension, Infertility, Insomnia, Migraine headache, Mixed hyperlipidemia, Otitis media, Pelvic abscess in female, Pneumonia, Stroke [facility]), and Stroke ([facility] has a past surgical history that includes Cervix surgery; eye surgery; Foot surgery (Right); Foot surgery (Bilateral); Foot surgery (Left); and IR TUNNELED CVC PLACE WO SQ PORT/PUMP > 5 YEARS (9/7/2021). Restrictions  Restrictions/Precautions  Restrictions/Precautions: Fall Risk  Required Braces or Orthoses?: No  Position Activity Restriction  Other position/activity restrictions: 26-year-old female with a history of ESRD on HD, diabetes, diabetic neuropathy, legal blindness, CVA with resultant right hemiparesis, HTN, and HLD who was admitted on 1/4 with recurrent falls. Due to increasing difficulty with ambulation and transfers, she had missed 2 dialysis sessions prior to admission. She was recently discharged on 12/29 for an admission with generalized weakness and falls consistent with this admission. She scored well enough with therapy evaluations to discharge to home however it does not appear she is able to care for herself with the assistance of her . She was evaluated by therapy and suggested to continue in an inpatient setting prior to returning home. Patient reports that she had her initial stroke in August and discharged to home with outpatient therapies and subsequently her second stroke in late 2021 resulted in her discharging to [address]. She felt that her therapies were not as effective or intensive as her outpatient therapies. She reports no current complaints. Subjective   General  Chart Reviewed:  Yes  Additional Pertinent Hx: Hx of CVA x 2 with multiple falls in home enviornment  Response To Previous Treatment: Patient with no complaints from previous session. Family / Caregiver Present: No  Referring Practitioner: Dr. Amanda Higginbotham  Subjective  Subjective: Pt agreeable to PT. Reports she feels more grounded in AM when she sits up and puts feet on floor immediately upon rising. General Comment  Comments: Pt concerned with D/C to home. PT still recommending home with supervision. Pain Screening  Patient Currently in Pain: Denies  Vital Signs  Patient Currently in Pain: Denies    Objective   Bed mobility  Bridging: Independent  Rolling to Left: Independent  Rolling to Right: Independent  Supine to Sit: Independent  Sit to Supine: Stand by assistance (enters bed from quadraped position)  Scooting: Minimal assistance (Required assistance scooting (L) + (R) to position straight. Independent scooting up in bed)  Comment: Flat full sized bed all without use of HR  Transfers  Sit to Stand: Contact guard assistance (inconsistent performance. Posterior lean often seen with forward flexed posture)  Stand to sit: Contact guard assistance (poor LE eccentric control. Relies on UE to control descent.)  Stand Pivot Transfers: Contact guard assistance; Moderate Assistance (CGA with RW, ModA without use of RW with posterior lean and posterior LOB noted d/t narrow ROMAINE and poor weight shift)  Comment: Inconsistent velocity and control throughout transfers.   Recommed RW for all transfers secondary to improved stability  Ambulation  Ambulation?: Yes  Ambulation 1  Surface: level tile  Device: Rolling Walker  Assistance: Contact guard assistance  Quality of Gait: narrow ROMAINE, decreased time in SLS, decreased stride length, FWD flexed posture, slow noble  Distance: 50' (max distance not attempted)  Comments: Requires VC for upright postue and wide ROMAINE  Stairs  # Steps : 3 (1 + 1 + 1)  Curbs: 4\"  Device: Rolling walker  Assistance: Contact guard assistance  Comment: Demonstration for step to pattern with VC for RW placement and sequence     Balance  Posture: Fair  Sitting - Static: Fair  Sitting - Dynamic: Fair;-  Standing - Static: Fair;-  Standing - Dynamic: -;Fair (CGA with use of UE support/RW)  Comments: During mobilty without device, patient noted to have increased postural sway with posterior LOB noted when pt in narrow ROMAINE, no visual input, and decreased attention to task. 2nd PT Session:  Pt seated in recliner upon arrival, denies pain, agreeable to PT session. Caregiver education completed with patient and spouse. Caregiver educated on use of gait belt. Gait belt issued to patient for use in home. Caregiver educated on proper body mechanics for transfer completion, ambulation, and stair negotiation including proper positioning of self while assisting patient, importance of footwork/positioning, proper use of assistive device. During family training patient complete bed mobility tasks with forward quadriped entry at [facility] to elevated height bed to simulate home environment. Education provided on under mattress HR for improved performance and safety. Surface to surface transfers completed with RW at [facility]. Ambulation completed with  ft at [place] [address] with ongoing VC for environment management. Education provided regarding use of walker basket for object carry to reduce risk of falling. Pt ascends/decends 4 steps with (B) HR at [facility] with step to sequence while descending. Demonstration completed with caregiver and therapist regarding floor to stand transfer, with discussion held regarding use of EMS if unable to safely complete. Patient defers active participation in floor to stand transition secondary to fatigue. Upon return to room, pt remains up in recliner with chair alarm and call light within reach. No new complaints following session. Goals  Short term goals  Time Frame for Short term goals: 2 weeks  Short term goal 1: Pt to complete bed mobility at modified independent level with use of bed rail.   Short term goal 2: Pt to complete transfers at [facility]  Short term goal 3: Pt to ambulate 150 ft with RW at [facility]  Short term goal 4: Pt to ascend/descend 4 steps with (B) HR at [facility]  Short term goal 5: Pt to complete car transfer at [facility]  Patient Goals   Patient goals : To reduce falling episodes    Plan    Plan  Times per week: 5x/week, 75 min/day  Times per day: Daily  Current Treatment Recommendations: Strengthening,Balance Training,Functional Mobility Training,Transfer Training,ADL/Self-care Training,Stair training,Endurance Training,Gait Training,Neuromuscular Re-education,Positioning,Modalities,Patient/Caregiver Education & Training,Safety Education & Training  Safety Devices  Type of devices: All fall risk precautions in place,Call light within reach,Gait belt,Chair alarm in place,Left in chair  Restraints  Initially in place: No     Therapy Time   Individual Concurrent Group Co-treatment   Time In 0930         Time Out 1010         Minutes 40         Timed Code Treatment Minutes: 40 Minutes     Second Session Therapy Time:   Individual Concurrent Group Co-treatment   Time In 9087         Time Out 1515         Minutes 53           Timed Code Treatment Minutes:  40 + 53    Total Treatment Minutes:  93 Minutes    Furman Goodell, SPT  Therapist observed and directed the patient's plan of care.   Co-signed and supervised by: Juan Thacker PT, DPT - [id number]

## 2022-01-18 NOTE — PROGRESS NOTES
Treatment time: 3    Net UF: 2000    Pre weight: 87.8  Post weight: 85.9  EDW: 85.9    Access used: cvc  Access function: well    Medications or blood products given: epogen 7000u    Regular outpatient schedule: MWF    Summary of response to treatment: tolerated well. New target weight 85.9. Report called to primary RN. Pt stable upon leaving unit    Copy of dialysis treatment record placed in chart, to be scanned into EMR.

## 2022-01-18 NOTE — PROGRESS NOTES
Assessment complete see flowsheets. A&Ox4. Meds given per eMAR. Denies pain. The care plan and education has been reviewed and mutually agreed upon with the patient. In bed, fall precautions in place, hourly rounding, call light and belongings in reach, bed in lowest position, wheels locked in place, side rails up x 3, walkways free of clutter. No further needs expressed at this time. Patient has a non-productive cough that appears to worsen during the evenings. Hot tea, repositioning, and prn dose of Tessalon given. Patient sways slightly when ambulating. Gait belt used with walker to ensure patient's safety. No other needs expressed.

## 2022-01-18 NOTE — PATIENT CARE CONFERENCE
[facility]  Inpatient Rehabilitation  Weekly Team Conference Note    Patient Name: Oneil Kaba        MRN: [id number]    : 1975  (55 y.o.)  Gender: female   Referring Practitioner: Dr. Amy Best  Diagnosis: Recurrent Falls    The team conference for this patient was held on 2022 at 11:00am and led by:  Sebastian Knight MD    CASE MANAGEMENT:  Assessment: Patient lives with spouse. Patient is disabled and pending SSDI determination. Patient has not worked for a long-time. Patient is legally blind, a dialysis patient etc. Patient anticipates discharging to home with needed supports. PHYSICAL THERAPY:    Bed Mobility:   Scooting: Contact guard assistance (to EOB. Posterior lean noted in unsupported sitting)    Transfers:  Sit to Stand: Contact guard assistance (require multiple attempts to self complete due to posterior LOB during initial stance resulting in sit to EOB. Over reliance on UE for push-off)  Stand to sit: Contact guard assistance  Comment: Functional mobility as noted above. Therapist assisted in dressing secondary to time constraints    Ambulation 1  Surface: level tile  Device: Rolling Walker  Assistance: Contact guard assistance  Quality of Gait: narrow ROMAINE, decreased time in SLS, decreased stride length, FWD flexed posture  Gait Deviations: Slow Kinjal  Distance: short distance (max distance not attempted)  Comments: Requires VC for upright postue and wide ROMAINE before beginning ambulation    Stairs  # Steps : 9  Stairs Height: 6\"  Rails: Bilateral  Curbs: 6\"  Device: No Device  Assistance: Minimal assistance  Comment: step through pattern with hesitation and misstepping present. Poor eccentric control descending stairs. Required TC to facilitate weight shift and balance. Over reliance on vision for foot placement.     QM:  Roll Left and Right  Assistance Needed: Supervision or touching assistance  CARE Score: 4  Discharge Goal: Independent  Sit to Lying  Assistance Needed: Supervision or touching assistance  CARE Score: 4  Discharge Goal: Independent  Lying to Sitting on Side of Bed  Assistance Needed: Supervision or touching assistance  CARE Score: 4  Discharge Goal: Independent  Sit to Stand  Assistance Needed: Supervision or touching assistance  CARE Score: 4  Discharge Goal: Supervision or touching assistance  Chair/Bed-to-Chair Transfer  Assistance Needed: Partial/moderate assistance  CARE Score: 3  Discharge Goal: Supervision or touching assistance  Car Transfer  Assistance Needed: Partial/moderate assistance  Reason if not Attempted: Not attempted due to medical condition or safety concerns  CARE Score: 3  Discharge Goal: Supervision or touching assistance  Walk 10 Feet  Assistance Needed: Partial/moderate assistance  CARE Score: 3  Discharge Goal: Supervision or touching assistance  Walk 50 Feet with Two Turns  Assistance Needed: Partial/moderate assistance  Reason if not Attempted: Not attempted due to medical condition or safety concerns  CARE Score: 3  Discharge Goal: Supervision or touching assistance  Walk 150 Feet  Reason if not Attempted: Not attempted due to medical condition or safety concerns  CARE Score: 88  Discharge Goal: Supervision or touching assistance  Walking 10 Feet on Uneven Surfaces  Reason if not Attempted: Not attempted due to medical condition or safety concerns  CARE Score: 88  Discharge Goal: Supervision or touching assistance  1 Step (Curb)  Reason if not Attempted: Not attempted due to medical condition or safety concerns  CARE Score: 88  Discharge Goal: Supervision or touching assistance  4 Steps  Assistance Needed: Partial/moderate assistance  Physical Assistance Level: 25%-49%  Reason if not Attempted: Not attempted due to medical condition or safety concerns  CARE Score: 3  Discharge Goal: Supervision or touching assistance  12 Steps  Reason if not Attempted: Not attempted due to medical condition or safety concerns  CARE Score: 88  Discharge Goal: Supervision or touching assistance  Picking Up Object  Reason if not Attempted: Not attempted due to medical condition or safety concerns  CARE Score: 88  Discharge Goal: Supervision or touching assistance  Wheelchair Ability  Uses a Wheelchair and/or Scooter?: No    SPEECH THERAPY:    Diet Level:ADULT DIET; Regular; 4 carb choices (60 gm/meal); Low Potassium (Less than 3000 mg/day)    Assessment: Progress Cognitive Diagnosis: Pt continues to present with mild cognitive linguistic deficits. High accuracy with naming tasks but continues to complain of effortful word production. Higher level cognitive-linguistic tasks are limited due to visual deficits. Speech Diagnosis: Pt continues to present with slow speech production and hoarse vocal quality at baseline. Will continue to assess pt's speech sound production and treat as appropriate. OCCUPATIONAL THERAPY:    ADL:   ADL  Feeding: Setup  Grooming: Contact guard assistance,Setup  UE Bathing: Setup,Verbal cueing,Contact guard assistance  LE Bathing: Setup,Verbal cueing,Contact guard assistance  UE Dressing: Setup,Verbal cueing,Contact guard assistance  LE Dressing: Contact guard assistance,Verbal cueing,Increased time to complete  Toileting: Dependent/Total  Additional Comments: Pt requesting to complete sponge bathing at sink. Pt completed UB/LB bathing and dressing alternating between sitting/standing. Intermittent posterior LOB that were self corrected. Pt completed oral care in stance at sink. Declines additional ADLs at this time    Toilet Transfers:   Toilet Transfers  Toilet - Technique: Ambulating  Equipment Used: Extra wide bedside commode  Toilet Transfer: Minimal assistance    QM:  Eating  Assistance Needed: Setup or clean-up assistance  CARE Score: 5  Discharge Goal: Independent  Oral Hygiene  Assistance Needed: Supervision or touching assistance  Reason if not Attempted: Patient refused  CARE Score: 4  Discharge Goal: Supervision or touching assistance  [address] Needed: Supervision or touching assistance  CARE Score: 4  Discharge Goal: Supervision or touching assistance  Toilet Transfer  Assistance Needed: Supervision or touching assistance  CARE Score: 4  Discharge Goal: Supervision or touching assistance  Shower/Bathe Self  Assistance Needed: Substantial/maximal assistance  CARE Score: 2  Discharge Goal: Supervision or touching assistance  Upper Body Dressing  Assistance Needed: Supervision or touching assistance  CARE Score: 4  Discharge Goal: Supervision or touching assistance  Lower Body Dressing  Assistance Needed: Substantial/maximal assistance  CARE Score: 2  Discharge Goal: Supervision or touching assistance  Putting On/Taking Off Footwear  Assistance Needed: Supervision or touching assistance  CARE Score: 4  Discharge Goal: Supervision or touching assistance    NUTRITION:  Weight: 189 lb 6 oz (85.9 kg) / Body mass index is 29.66 kg/m². Diet Order:CCC, low k+    Supplements:NA    Appetite stable with % meals consumed. No nutrition concerns expressed @ this time. Please see nutrition note for details. NURSING:    Risk for Readmission: 36%    Barrett Fall Risk Score: 65  Wounds/Incisions/Ulcers: None noted  Medication Review: Medications reviewed with patient daily. Pain: Managed per [facility]. Consultations/Labs/X-rays: Labs:Renal, CBC Q MWF    Patient/Family Education provided by team:    Discharge Plan   Estimated Length of Stay:3 days  Destination: home health  Pass:No  Services at Discharge: [facility] OT/PT/SLP  Equipment at Discharge: walker tray and/or basket, RW  Factors facilitating achievement of predicted outcomes: family support  Barriers to the achievement of predicted outcomes: complex medical hx, visual deficits  Patient Goals: To reduce falling episodes, to get stronger, stop falling    Plan of Care  Interdisciplinary Individualized Plan of Care Review:    Continue Current Plan of Care:  Yes  Modifications:_____________________________    Rehab Team Members in attendance for Team Conference:  Hank Juarez, MSW, LSW    Nancy Mack, RD, LD    Arsenio Chandr, OTR/L    Sarah Crow, PT, DPT    Norm Rosario M.A., CCC-SLP    Cher Galindo, BSN, RN, Gl. Chava Potts PT, DPT,     I approve the established interdisciplinary plan of care as documented within the medical record of Nancy Christyrichard.     Hipolito Thompson MD  Electronically signed by Hipolito Thompson MD on 1/18/2022 at 11:21 AM

## 2022-01-18 NOTE — PROGRESS NOTES
Janee Alberta  1/18/2022  [id number]    Chief Complaint: Recurrent falls    Subjective:   No overnight events. No current complaints. UCx sensitive to bactrim. BP low and multiple BP meds discontinued. ROS: No CP, SOB, dyspnea    Objective:  Patient Vitals for the past 24 hrs:   BP Temp Temp src Pulse Resp SpO2 Weight   01/18/22 0515 -- -- -- -- -- -- 190 lb 14.7 oz (86.6 kg)   01/17/22 2008 (!) 105/53 98.7 °F (37.1 °C) Oral 90 18 97 % --   01/17/22 1741 (!) 144/65 97.5 °F (36.4 °C) -- 77 17 -- 189 lb 6 oz (85.9 kg)   01/17/22 1420 121/64 97.8 °F (36.6 °C) -- 80 17 -- 193 lb 9 oz (87.8 kg)   01/17/22 0948 98/64 97.7 °F (36.5 °C) Oral 79 18 97 % --   01/17/22 0941 95/64 -- -- -- -- -- --     Gen: No distress, pleasant. Resting in WC  HEENT: Normocephalic, atraumatic  CV: Regular rate and rhythm. No MRG   Resp: No respiratory distress. CTAB   Abd: Soft, nontender nondistended  Ext: No edema. Neuro: Alert, oriented, appropriately interactive. Laboratory data: Available via EMR. Therapy progress:  PT  Position Activity Restriction  Other position/activity restrictions: 80-year-old female with a history of ESRD on HD, diabetes, diabetic neuropathy, legal blindness, CVA with resultant right hemiparesis, HTN, and HLD who was admitted on 1/4 with recurrent falls. Due to increasing difficulty with ambulation and transfers, she had missed 2 dialysis sessions prior to admission. She was recently discharged on 12/29 for an admission with generalized weakness and falls consistent with this admission. She scored well enough with therapy evaluations to discharge to home however it does not appear she is able to care for herself with the assistance of her . She was evaluated by therapy and suggested to continue in an inpatient setting prior to returning home.   Patient reports that she had her initial stroke in August and discharged to home with outpatient therapies and subsequently her second stroke in late 2021 resulted in her discharging to [address]. She felt that her therapies were not as effective or intensive as her outpatient therapies. She reports no current complaints. PT Equipment Recommendations  Equipment Needed: Yes  Mobility Devices: Marlene [place]: Rolling  Objective     Bridging: Minimal assistance  Scooting: Contact guard assistance (to EOB. Posterior lean noted in unsupported sitting)  Sit to Stand: Contact guard assistance (require multiple attempts to self complete due to posterior LOB during initial stance resulting in sit to EOB. Over reliance on UE for push-off)  Stand to sit: Contact guard assistance  Ambulation  Device: Rolling Walker  Assistance: Contact guard assistance  Distance: short distance (max distance not attempted)  Assessment   Assessment: Pt continues to improve functional mobility and safety awareness. Requires frequent VC for widened ROMAINE when performing transfers. Displays decreased joint proprioception of the lower leg and foot requriring UE support or use of vision to complete tasks. D/t decreased proprioception she continues to demonstrate LOB posteriorly, along with posterior lean, when performing functional mobility. Decreased attention to task which requires VC for refocussing. She would benefit from continued skilled physical therapy to improve safety education and to decrease risk of falls.     OT  Objective  Feeding: Setup  UE Bathing: Setup,Verbal cueing,Contact guard assistance  LE Bathing: Setup,Verbal cueing,Contact guard assistance  UE Dressing: Setup,Verbal cueing,Contact guard assistance  LE Dressing: Contact guard assistance,Verbal cueing,Increased time to complete  Toileting: Dependent/Total  Toilet - Technique: Ambulating  Equipment Used: Extra wide bedside commode  Toilet Transfer: Minimal assistance     Sit to stand: Contact guard assistance  Stand to sit: Contact guard assistance  Toilet - Technique: Ambulating  Equipment Used: Extra wide bedside commode  Assessment   Assessment: Pt is well below her baseline level of occupational function, based on the above deficits associated with debility and recurrent. Pt has a chronic visual deficit. Pt would benefit from continued skilled acute OT services to address these deficits. Pt is progressing well, demo's increased ability to complete LB tasks and requiring less VCs to attend to activities. Continue POC    SLP  Cognitive Diagnosis: Pt continues to present with mild cognitive linguistic deficits. High accuracy with naming tasks but continues to complain of effortful word production. Higher level cognitive-linguistic tasks are limited due to visual deficits. Speech Diagnosis: Pt continues to present with slow speech production and hoarse vocal quality at baseline. Will continue to assess pt's speech sound production and treat as appropriate. Body mass index is 29.9 kg/m².     Assessment:  Patient Active Problem List   Diagnosis    Type 2 diabetes mellitus, with long-term current use of insulin (HCC)    Mixed hyperlipidemia    Migraine headache    Anemia    Diabetic foot infection ([address]    Pyogenic inflammation of bone ([address]    History of medication noncompliance    Osteomyelitis of left foot (HCC)    Nephrotic syndrome    Peripheral edema    Pulmonary edema    Right sided numbness    Tobacco dependence    Chronic kidney disease (CKD), stage III (moderate) (HCC)    Chronic diastolic (congestive) heart failure (HCC)    History of CVA (cerebrovascular accident)    Arterial ischemic stroke, ICA, left, acute ([address]    HTN (hypertension), benign    DM (diabetes mellitus), secondary, uncontrolled, w/neurologic complic ([address]    Dyslipidemia    Smoker    Panic disorder    Isolated proteinuria    Acute on chronic diastolic heart failure (HCC)    Diabetic peripheral neuropathy (HCC)    Acute respiratory failure (HCC)    Depression    Abnormal gait    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, associated with type 2 diabetes mellitus (HCC)    Cellulitis of right foot    Hidradenitis suppurativa    Hypocalcemia    Non-toxic multinodular goiter    Polyneuropathy due to type 2 diabetes mellitus (HCC)    Proliferative diabetic retinopathy associated with type 2 diabetes mellitus [facility]    ESRD (end stage renal disease) ([facility]    Acute respiratory failure with hypoxemia ([facility]    Acute respiratory failure due to COVID-19 ([facility]    Suspected COVID-19 virus infection    Epiglottitis    Recurrent falls       Plan:   Debility: PT/OT     Recurrent falls: PT/OT     ESRD on HD: MWF, Nephro following. AVF creation 2/10.      DM: lantus decreased to 17, SSI     DM neuropathy: lyrica 75     H/O CVA: resultant R hemiparesis per report but none significant on exam     HTN: No current meds. Discontinued norvasc 10, clonidine 0.2, lisinopril 40, spironolactone 50. Nephro managing     HLD: Lipitor 40     Anx/Dep: wellbutrin , xanax PRN, resumed home luvox 100 HS    Staph epi UTI: - sensitive to bactrim    Hyperkalemia: per HD.     Bowels: Per protocol  Bladder: Per protocol   Sleep: Trazodone provided prn. Pain: tylenol, tramadol PRN   DVT PPx: Heparin    GILSON: 1/21    Team conference was held today on the patient and discussed directly with the patient utilizing their entire treatment team. Please see separate team note for details. Total treatment time for today's care >35 min. Sonia Figueredo MD 1/18/2022, 8:06 AM    * This document was created using dictation software. While all precautions were taken to ensure accuracy, errors may have occurred. Please disregard any typographical errors.

## 2022-01-18 NOTE — PROGRESS NOTES
ACUTE REHAB UNIT  SPEECH/LANGUAGE PATHOLOGY      [x] Daily  [x] Weekly Care Conference Note  [] Discharge    Patient:Kristine Corado John     MDN:[id number]  UEU:[id number]  Room #: QMV-3390/8659-39   Rehab Dx/Hx: Recurrent falls [R29.6]  Date of Admit: 1/6/2022      Precautions: falls  Home situation: Lives at home with her ; Not an active ;  completes finances and places pills into pill organizer for pt)   ST Dx: Cognitive Linguistic Deficits     Daily Treatment Info:   Date: 1/18/2022     Tx session 1   Pain None reported    Subjective     Pt up in chair, pleasant and willing to participate throughout session. Pt's  present after session. Expressed his concerns regarding pt's decision making /problem solving leading to falls and metabolic changes resulting in hallucinations. Provided pt's spouse with written list of problem solving questions to implement in home environment. Discussed use of safety devices (voice command options and fall alerts). Pt and spouse verbalized understanding       Objective:  Goals    Pt will complete word associative tasks to improve mental flexibility, complex thinking, and working memory skills with 90% accuracy. Divergent naming (abstract / complex)   - 100% accuracy f/5    GOAL MET; Ongoing- will continue to monitor and target as appropriate. Pt will complete executive function tasks (i.e. planning, deductive reasoning, inferential, functional organization tasks) with 80% accuracy independently.    Monetary word problem solving:   - 100% accuracy; additional time required to solve problems due to visual deficits    Safety problem solving   - presented with 5 self questions to implement in a variety of home scenarios   - pt appropriately responded to questions in regards to safety   - pt indicated she often knows that things are unsafe but wants to try to be as independent as possible so tries things she knows she shouldn't     GOAL MET; Ongoing - Will continue to monitor to promote safety and minimize fall risk upon d/c. Continue to monitor speech sound production with additional goals to be added as warranted. Goal not directly targeted - subjectively rated: adequate vocal intensity and articulation for intelligible speech production     GOAL MET; Speech intensity fluctuates and appears related to medication / fatigue changes but overall pt's intelligibility has improved for adequate speech sound production      Other areas targeted:    Education:   Provided education regarding rationale for tasks and plan of care. Pt verbalized understanding    Safety Devices: [x] Call light within reach  [x] Chair alarm activated  [] Bed alarm activated  [] Other:     Assessment:   Weekly Update:   Speech Therapy Diagnosis  Cognitive Diagnosis: Pt continues to present with mild cognitive linguistic deficits. High accuracy with naming tasks but continues to complain of effortful word production. Higher level cognitive-linguistic tasks are limited due to visual deficits. Speech Diagnosis: Pt continues to present with slow speech production and hoarse vocal quality at baseline. Will continue to assess pt's speech sound production and treat as appropriate. Vocal intensity has positive effect on speech quality. Current Diet Order: ADULT DIET; Regular; 4 carb choices (60 gm/meal); Low Potassium (Less than 3000 mg/day)            Plan:     Frequency:  5days/week   30 minutes/day    Discharge Recommendations:   Barriers: Visual deficits (legally blind)   Discharge Recommendations:  [] Home independently  [x] Home with assistance []  24 hour supervision  [] SNF [] Other:  Continued SLP Treatment:  [] Yes [] No [x] TBD based on progress while on ARU [] Vital Stim indicated [] Other:   Estimated discharge date: 1/21/2022     Session 1 Times:     Individual Concurrent Group Co-treatment   Time In 0900 +1150         Time Out 0930 +1200         Minutes 30 +10         Time Code Minutes  40        Timed Code Treatment Minutes: 40 Minutes     Total Treatment Minutes: 40 minutes     Electronically Signed by     Kavin Campbell M.A. CCC-SLP MARJAN [id number]  Speech-Language Pathologist   1/18/2022 9:09 AM     The speech-language pathologist was present, directed the patient's care, made skilled judgment and was responsible for assessment and treatment.     Alexus PRICE,  Speech-Language Pathology

## 2022-01-18 NOTE — PLAN OF CARE
Problem: Falls - Risk of:  Goal: Will remain free from falls  Description: Will remain free from falls  1/18/2022 0555 by Simona Keenan RN  Outcome: Ongoing  1/17/2022 1616 by Mey Rodriguez RN  Outcome: Ongoing  Note: Education Provided as follows:  Family/Patient made aware of the tailored interventions falls plan using the TIPS TOOL.    Goal: Absence of physical injury  Description: Absence of physical injury  1/18/2022 0555 by Simona Keenan RN  Outcome: Ongoing  1/17/2022 1616 by Mey Rodriguez RN  Outcome: Ongoing     Problem: Nutrition  Goal: Optimal nutrition therapy  1/18/2022 0555 by Simona Keenan RN  Outcome: Ongoing  1/17/2022 1616 by Mey Rodriguez RN  Outcome: Ongoing     Problem: IP BOWEL ELIMINATION  Goal: LTG - patient will have regular and routine bowel evacuation  1/18/2022 0555 by Simona Keenan RN  Outcome: Ongoing  1/17/2022 1616 by Mey Rodriguez RN  Outcome: Ongoing  Goal: STG - Patient will verbalize signs and symptoms of constipation and how to prevent/alleviate  1/18/2022 0555 by Simona Keenan RN  Outcome: Ongoing  1/17/2022 1616 by Mey Rodriguez RN  Outcome: Ongoing     Problem: IP BLADDER/VOIDING  Goal: STG - Patient will state signs and symptoms of UTI  1/18/2022 0555 by Simona Keenan RN  Outcome: Ongoing  1/17/2022 1616 by Mey Rodriguez RN  Outcome: Ongoing     Problem: SKIN INTEGRITY  Goal: LTG - Patient will be free from infection  1/18/2022 0555 by Simona Keenan RN  Outcome: Ongoing  1/17/2022 1616 by Mey Rodriguez RN  Outcome: Ongoing  Goal: STG - Patient demonstrates preventative skin care measures  1/18/2022 0555 by Simona Keenan RN  Outcome: Ongoing  1/17/2022 1616 by Mey Rodriguez RN  Outcome: Ongoing     Problem: Airway Clearance - Ineffective  Goal: Achieve or maintain patent airway  1/18/2022 0555 by Simona Keenan RN  Outcome: Ongoing  1/17/2022 1616 by Mey Rodriguez RN  Outcome: Ongoing     Problem: Gas Exchange - Impaired  Goal: Absence of hypoxia  1/18/2022 0555 by Katie Alonso RN  Outcome: Ongoing  1/17/2022 1616 by Arturo Batres RN  Outcome: Ongoing  Goal: Promote optimal lung function  1/18/2022 0555 by Katie Alonso RN  Outcome: Ongoing  1/17/2022 1616 by Arturo Batres RN  Outcome: Ongoing     Problem: Breathing Pattern - Ineffective  Goal: Ability to achieve and maintain a regular respiratory rate  1/18/2022 0555 by Katie Alonso RN  Outcome: Ongoing  1/17/2022 1616 by Arturo Batres RN  Outcome: Ongoing     Problem:  Body Temperature -  Risk of, Imbalanced  Goal: Ability to maintain a body temperature within defined limits  1/18/2022 0555 by Katie Alonso RN  Outcome: Ongoing  1/17/2022 1616 by Arturo Batres RN  Outcome: Ongoing  Goal: Will regain or maintain usual level of consciousness  1/18/2022 0555 by Katie Alonso RN  Outcome: Ongoing  1/17/2022 1616 by Arturo Batres RN  Outcome: Ongoing  Goal: Complications related to the disease process, condition or treatment will be avoided or minimized  1/18/2022 0555 by Katie Alonso RN  Outcome: Ongoing  1/17/2022 1616 by Arturo Batres RN  Outcome: Ongoing     Problem: Isolation Precautions - Risk of Spread of Infection  Goal: Prevent transmission of infection  1/18/2022 0555 by Katie Alonso RN  Outcome: Ongoing  1/17/2022 1616 by Arturo Batres RN  Outcome: Ongoing     Problem: Nutrition Deficits  Goal: Optimize nutritional status  1/18/2022 0555 by Katie Alonso RN  Outcome: Ongoing  1/17/2022 1616 by Arturo Batres RN  Outcome: Ongoing     Problem: Risk for Fluid Volume Deficit  Goal: Maintain normal heart rhythm  1/18/2022 0555 by Katie Alonso RN  Outcome: Ongoing  1/17/2022 1616 by Arturo Batres RN  Outcome: Ongoing  Goal: Maintain absence of muscle cramping  1/18/2022 0555 by Katie Alonso RN  Outcome: Ongoing  1/17/2022 1616 by Arturo Batres RN  Outcome: Ongoing  Goal: Maintain normal serum potassium, sodium, calcium, phosphorus, and pH  1/18/2022 0555 by Tera Kim RN  Outcome: Ongoing  1/17/2022 1616 by Aga Patton RN  Outcome: Ongoing     Problem: Loneliness or Risk for Loneliness  Goal: Demonstrate positive use of time alone when socialization is not possible  1/18/2022 0555 by Tera Kim RN  Outcome: Ongoing  1/17/2022 1616 by Aga Patton RN  Outcome: Ongoing     Problem: Fatigue  Goal: Verbalize increase energy and improved vitality  1/18/2022 0555 by Tera Kim RN  Outcome: Ongoing  1/17/2022 1616 by Aga Patton RN  Outcome: Ongoing     Problem: Patient Education: Go to Patient Education Activity  Goal: Patient/Family Education  1/18/2022 0555 by Tera Kim RN  Outcome: Ongoing  1/17/2022 1616 by Aga Patton RN  Outcome: Ongoing     Problem: Pain:  Goal: Pain level will decrease  Description: Pain level will decrease  1/18/2022 0555 by Tera Kim RN  Outcome: Ongoing  1/17/2022 1616 by Aga Patton RN  Outcome: Ongoing  Goal: Control of acute pain  Description: Control of acute pain  1/18/2022 0555 by Tera Kim RN  Outcome: Ongoing  1/17/2022 1616 by Aga Patton RN  Outcome: Ongoing  Goal: Control of chronic pain  Description: Control of chronic pain  1/18/2022 0555 by Tera Kim RN  Outcome: Ongoing  1/17/2022 1616 by Aga Patton RN  Outcome: Ongoing     Problem: Skin Integrity:  Goal: Will show no infection signs and symptoms  Description: Will show no infection signs and symptoms  1/18/2022 0555 by Tera Kim RN  Outcome: Ongoing  1/17/2022 1616 by Aga Patton RN  Outcome: Ongoing  Goal: Absence of new skin breakdown  Description: Absence of new skin breakdown  1/18/2022 0555 by Tera Kim RN  Outcome: Ongoing  1/17/2022 1616 by Aga Patton RN  Outcome: Ongoing     Problem:  Activity:  Goal: Fatigue will decrease  Description: Fatigue will decrease  1/18/2022 0555 by Nickie Elias RN  Outcome: Ongoing  1/17/2022 1616 by Andrade Comer RN  Outcome: Ongoing  Goal: Risk for activity intolerance will decrease  Description: Risk for activity intolerance will decrease  1/18/2022 0555 by Nickie Elias RN  Outcome: Ongoing  1/17/2022 1616 by Andrade Comer RN  Outcome: Ongoing

## 2022-01-18 NOTE — CARE COORDINATION
Team conference held today. Spoke with Patient and Spouse  to discuss progress with therapy, barriers to discharge, and plans to return home. Estimated discharge date set for 1/21/2022. Patient and Spouse agreeable to Patient receiving outpatient PT, OT and SLP. Patient's Spouse agreed to provide the 24-hour supervision that the patient requires. Patient to be provided with information on [facility] [employer]. Patient anticipates to discharging home with appropriate DME. Will continue to follow for support and discharge planning.       Dasha HOLLEY, NAUN, [facility]    [phone]      Electronically signed by Ricardo Jeffery on 1/18/2022 at 12:23 PM

## 2022-01-18 NOTE — PROGRESS NOTES
MD Ailyn Bal MD Katherina Slade, MD                Office: [phone]                      Fax: [phone]          Inpatient  Progress Note:     PATIENT NAME: Daly Biggs  : 1975  MRN: [id number]         IMPRESSION / RECOMMENDATION:       Admitted for:  Recurrent falls [R29.6]    ESRD-tolerating dialysis well. HD MWF. HD yesterday, EDW decreased. Hyponatremia-follow  Metabolic acidosis-follow with HD  Anemia due to CKD-RIA with HD    Vital signs stable. BP lower limits. Stop Amlodipine and Clonidine. Follow bp. Continue renal diet    Medications reviewed. Monitor anemia levels. 1. ESRD on iHD: MWF ,  - follows w/ Dr. Gerson Orantes- [facility] dialysis center   - access: [facility]       - outpt HD center: [facility], Dr Gerson Orantes   - recently started HD in 2021, during admission w/ [facility] w/ KINZA on CKD-4, was lost for f/u,), had acute hypoxic respiratory failure, pneumonia - needing intubation in past   - missed HD x2  - encouraged compliance as if her solutes are better control, she may feel overall better    - so needed HD on admission during inpatient stay  - now admitted to ARU for inpatient rehab    Labs MWF - f/u K   Added Low K diet  Decreased Lisinopril 40 -> 20 mg QD      EDW listed 88 (but per pt 85 kg)     2. Hypertension/Volume Status:+edema.  - BP HTN - hx of resistant HTN - slightly uncontrolled - f/u after HD + resume home meds   - EDW reported 88  -> 83 kg currently - so might be her new DW  - Na - chronic hypoNatremia - f/u w/ HD    - bp low, meds adjusted. Now only on Lisinopril 10mg daily.       3. Electrolytes/acid-base:  K: WNL  High AG metabolic acidosis: mild - f/u w/ HD      4. Anemia of renal failure: at goal  - RIA: w/ HD     5.  BMD:  - Phos: follow iPTH outpt         Other Problems:  Recurrent falls [R29.6]       Ritche Francis Mcardle, MD  [facility] [address] [place] [address]  Office: [phone] or [employer]  Fax: [phone]     Subjective  No complaints today      Vitals:    01/18/22 0816   BP: 109/79   Pulse: 80   Resp: 18   Temp: 98.3 °F (36.8 °C)   SpO2: 98%     PE  Gen-appears well  HEENT-anicteric sclerae  Chest-clear  Heart-regular  Abd-soft  Ext-1+edema, decreased         Labs Reviewed  by me   Labs   Lab Results   Component Value Date    CREATININE 7.1 (HH) 01/17/2022    BUN 78 (H) 01/17/2022     (L) 01/17/2022    K 4.6 01/17/2022    CL 92 (L) 01/17/2022    CO2 19 (L) 01/17/2022     Lab Results   Component Value Date    WBC 11.8 (H) 01/17/2022    HGB 9.1 (L) 01/17/2022    HCT 27.9 (L) 01/17/2022    MCV 80.1 01/17/2022     01/17/2022       Dialysis Treatment and Prescription reviewed

## 2022-01-19 LAB
ALBUMIN SERPL-MCNC: 3.7 G/DL (ref 3.4–5)
ANION GAP SERPL CALCULATED.3IONS-SCNC: 15 MMOL/L (ref 3–16)
BUN BLDV-MCNC: 56 MG/DL (ref 7–20)
CALCIUM SERPL-MCNC: 9.1 MG/DL (ref 8.3–10.6)
CHLORIDE BLD-SCNC: 93 MMOL/L (ref 99–110)
CO2: 24 MMOL/L (ref 21–32)
CREAT SERPL-MCNC: 5.8 MG/DL (ref 0.6–1.1)
GFR AFRICAN AMERICAN: 9
GFR NON-AFRICAN AMERICAN: 8
GLUCOSE BLD-MCNC: 102 MG/DL (ref 70–99)
GLUCOSE BLD-MCNC: 132 MG/DL (ref 70–99)
GLUCOSE BLD-MCNC: 140 MG/DL (ref 70–99)
GLUCOSE BLD-MCNC: 201 MG/DL (ref 70–99)
GLUCOSE BLD-MCNC: 95 MG/DL (ref 70–99)
HCT VFR BLD CALC: 27.8 % (ref 36–48)
HEMOGLOBIN: 9.1 G/DL (ref 12–16)
MCH RBC QN AUTO: 26 PG (ref 26–34)
MCHC RBC AUTO-ENTMCNC: 32.7 G/DL (ref 31–36)
MCV RBC AUTO: 79.6 FL (ref 80–100)
PDW BLD-RTO: 17.6 % (ref 12.4–15.4)
PERFORMED ON: ABNORMAL
PHOSPHORUS: 6.8 MG/DL (ref 2.5–4.9)
PLATELET # BLD: 224 K/UL (ref 135–450)
PMV BLD AUTO: 8.3 FL (ref 5–10.5)
POTASSIUM SERPL-SCNC: 4.8 MMOL/L (ref 3.5–5.1)
RBC # BLD: 3.49 M/UL (ref 4–5.2)
SODIUM BLD-SCNC: 132 MMOL/L (ref 136–145)
WBC # BLD: 9.9 K/UL (ref 4–11)

## 2022-01-19 PROCEDURE — 94640 AIRWAY INHALATION TREATMENT: CPT

## 2022-01-19 PROCEDURE — 97130 THER IVNTJ EA ADDL 15 MIN: CPT

## 2022-01-19 PROCEDURE — 6360000002 HC RX W HCPCS: Performed by: PHYSICAL MEDICINE & REHABILITATION

## 2022-01-19 PROCEDURE — 97129 THER IVNTJ 1ST 15 MIN: CPT

## 2022-01-19 PROCEDURE — 1280000000 HC REHAB R&B

## 2022-01-19 PROCEDURE — 97530 THERAPEUTIC ACTIVITIES: CPT

## 2022-01-19 PROCEDURE — 6370000000 HC RX 637 (ALT 250 FOR IP): Performed by: PHYSICAL MEDICINE & REHABILITATION

## 2022-01-19 PROCEDURE — 97535 SELF CARE MNGMENT TRAINING: CPT

## 2022-01-19 PROCEDURE — 80069 RENAL FUNCTION PANEL: CPT

## 2022-01-19 PROCEDURE — 6360000002 HC RX W HCPCS: Performed by: INTERNAL MEDICINE

## 2022-01-19 PROCEDURE — 6370000000 HC RX 637 (ALT 250 FOR IP): Performed by: INTERNAL MEDICINE

## 2022-01-19 PROCEDURE — 36415 COLL VENOUS BLD VENIPUNCTURE: CPT

## 2022-01-19 PROCEDURE — 97116 GAIT TRAINING THERAPY: CPT

## 2022-01-19 PROCEDURE — 97112 NEUROMUSCULAR REEDUCATION: CPT

## 2022-01-19 PROCEDURE — 85027 COMPLETE CBC AUTOMATED: CPT

## 2022-01-19 PROCEDURE — 90935 HEMODIALYSIS ONE EVALUATION: CPT

## 2022-01-19 RX ADMIN — HEPARIN SODIUM 5000 UNITS: 5000 INJECTION INTRAVENOUS; SUBCUTANEOUS at 06:03

## 2022-01-19 RX ADMIN — TIOTROPIUM BROMIDE AND OLODATEROL 2 PUFF: 3.124; 2.736 SPRAY, METERED RESPIRATORY (INHALATION) at 05:24

## 2022-01-19 RX ADMIN — HEPARIN SODIUM 5000 UNITS: 5000 INJECTION INTRAVENOUS; SUBCUTANEOUS at 19:11

## 2022-01-19 RX ADMIN — INSULIN LISPRO 2 UNITS: 100 INJECTION, SOLUTION INTRAVENOUS; SUBCUTANEOUS at 19:12

## 2022-01-19 RX ADMIN — BUPROPION HYDROCHLORIDE 150 MG: 150 TABLET, EXTENDED RELEASE ORAL at 09:06

## 2022-01-19 RX ADMIN — BENZONATATE 200 MG: 100 CAPSULE ORAL at 06:10

## 2022-01-19 RX ADMIN — ATORVASTATIN CALCIUM 40 MG: 40 TABLET, FILM COATED ORAL at 20:24

## 2022-01-19 RX ADMIN — BENZONATATE 200 MG: 100 CAPSULE ORAL at 20:27

## 2022-01-19 RX ADMIN — TRAMADOL HYDROCHLORIDE 50 MG: 50 TABLET, FILM COATED ORAL at 20:27

## 2022-01-19 RX ADMIN — INSULIN LISPRO 2 UNITS: 100 INJECTION, SOLUTION INTRAVENOUS; SUBCUTANEOUS at 22:49

## 2022-01-19 RX ADMIN — INSULIN GLARGINE 17 UNITS: 100 INJECTION, SOLUTION SUBCUTANEOUS at 09:09

## 2022-01-19 RX ADMIN — EPOETIN ALFA-EPBX 7000 UNITS: 10000 INJECTION, SOLUTION INTRAVENOUS; SUBCUTANEOUS at 17:12

## 2022-01-19 RX ADMIN — LISINOPRIL 10 MG: 10 TABLET ORAL at 20:25

## 2022-01-19 RX ADMIN — HEPARIN SODIUM 5000 UNITS: 5000 INJECTION INTRAVENOUS; SUBCUTANEOUS at 20:25

## 2022-01-19 RX ADMIN — ALPRAZOLAM 0.75 MG: 0.5 TABLET ORAL at 06:11

## 2022-01-19 RX ADMIN — ALPRAZOLAM 0.75 MG: 0.5 TABLET ORAL at 10:23

## 2022-01-19 RX ADMIN — FLUVOXAMINE MALEATE 100 MG: 50 TABLET, COATED ORAL at 20:29

## 2022-01-19 RX ADMIN — PROPRANOLOL HYDROCHLORIDE 80 MG: 80 CAPSULE, EXTENDED RELEASE ORAL at 20:28

## 2022-01-19 RX ADMIN — ALPRAZOLAM 0.75 MG: 0.5 TABLET ORAL at 20:25

## 2022-01-19 RX ADMIN — SULFAMETHOXAZOLE AND TRIMETHOPRIM 1 TABLET: 800; 160 TABLET ORAL at 09:07

## 2022-01-19 RX ADMIN — PREGABALIN 75 MG: 75 CAPSULE ORAL at 20:26

## 2022-01-19 RX ADMIN — ASPIRIN 81 MG: 81 TABLET, COATED ORAL at 09:06

## 2022-01-19 ASSESSMENT — PAIN DESCRIPTION - DESCRIPTORS: DESCRIPTORS: ACHING;SORE

## 2022-01-19 ASSESSMENT — PAIN SCALES - GENERAL
PAINLEVEL_OUTOF10: 0
PAINLEVEL_OUTOF10: 2
PAINLEVEL_OUTOF10: 6
PAINLEVEL_OUTOF10: 0
PAINLEVEL_OUTOF10: 0

## 2022-01-19 ASSESSMENT — PAIN DESCRIPTION - PAIN TYPE: TYPE: ACUTE PAIN

## 2022-01-19 ASSESSMENT — PAIN DESCRIPTION - ORIENTATION: ORIENTATION: LEFT;RIGHT

## 2022-01-19 ASSESSMENT — PAIN DESCRIPTION - LOCATION: LOCATION: KNEE

## 2022-01-19 NOTE — PROGRESS NOTES
ACUTE REHAB UNIT  SPEECH/LANGUAGE PATHOLOGY      [x] Daily  [] Weekly Care Conference Note  [] Discharge    Patient:Kristine Ragsdale     IYQ:[birth year]  VW  Room #: GPU-4690/0811-38   Rehab Dx/Hx: Recurrent falls [R29.6]  Date of Admit: 2022      Precautions: falls  Home situation: Lives at home with her ; Not an active ;  completes finances and places pills into pill organizer for pt)   ST Dx: Cognitive Linguistic Deficits     Daily Treatment Info:   Date: 2022     Tx session 1   Pain None reported    Subjective     Pt up in chair. Reported feeling anxious about going home. Provided support and encouragement. Objective:  Goals    Pt will complete word associative tasks to improve mental flexibility, complex thinking, and working memory skills with 90% accuracy. Goal not targeted this session. GOAL MET; Ongoing- will continue to monitor and target as appropriate. Pt will complete executive function tasks (i.e. planning, deductive reasoning, inferential, functional organization tasks) with 80% accuracy independently. Safety problem solving   - presented with 5 self questions to implement in a variety of home scenarios (vacuuming, household chores); pt recalled 5/5 questions at the end of the task   - pt appropriately responded to questions in regards to safety   - pt indicated she often knows that things are unsafe but wants to try to be as independent as possible so tries things she knows she shouldn't  \"I just want to do something and I just do it without thinking about the risks\"   - educated regarding importance of safe decision making to minimize fall risk     GOAL MET; Ongoing - Will continue to monitor to promote safety and minimize fall risk upon d/c. Continue to monitor speech sound production with additional goals to be added as warranted.    Goal not directly targeted    GOAL MET; Speech intensity fluctuates and appears related to medication / fatigue changes but overall pt's intelligibility has improved for adequate speech sound production      Other areas targeted:    Education:   Provided education regarding rationale for tasks and plan of care. Pt verbalized understanding    Safety Devices: [x] Call light within reach  [x] Chair alarm activated  [] Bed alarm activated  [] Other:     Assessment:   Weekly Update:   Speech Therapy Diagnosis  Cognitive Diagnosis: Pt continues to present with mild cognitive linguistic deficits. High accuracy with naming tasks but continues to complain of effortful word production. Higher level cognitive-linguistic tasks are limited due to visual deficits. Speech Diagnosis: Pt continues to present with slow speech production and hoarse vocal quality at baseline. Will continue to assess pt's speech sound production and treat as appropriate. Vocal intensity has positive effect on speech quality. Current Diet Order: ADULT DIET; Regular; 4 carb choices (60 gm/meal); Low Potassium (Less than 3000 mg/day); 1500 ml            Plan:     Frequency:  5days/week   30 minutes/day    Discharge Recommendations:   Barriers: Visual deficits (legally blind)   Discharge Recommendations:  [] Home independently  [x] Home with assistance []  24 hour supervision  [] SNF [] Other:  Continued SLP Treatment:  [] Yes [] No [x] TBD based on progress while on ARU [] Vital Stim indicated [] Other:   Estimated discharge date: 1/21/2022     Session 1 Times: Individual Concurrent Group Co-treatment   Time In 0830          Time Out 0900          Minutes 30          Time Code Minutes  30        Timed Code Treatment Minutes: 30 Minutes     Total Treatment Minutes: 30 minutes     Electronically Signed by     Phoenix Red M.A.  CCC-SLP MARJAN [id number]  Speech-Language Pathologist   1/19/2022 9:00 AM     The speech-language pathologist was present, directed the patient's care, made skilled judgment and was responsible for assessment and vic.     Kristin PRICE,  Speech-Language Pathology

## 2022-01-19 NOTE — PROGRESS NOTES
MD Stephanie Villagran MD Burnis Fanny, MD                Office: [phone]                      Fax: [phone]          Inpatient  Progress Note:     PATIENT NAME: Kang Ontiveros  : 1975  MRN: [id number]         IMPRESSION / RECOMMENDATION:       Admitted for:  Recurrent falls [R29.6]    ESRD-tolerating dialysis well. HD MWF. HD today, EDW decreased. Hyponatremia-follow  Metabolic acidosis-follow with HD  Anemia due to CKD-RIA with HD    Vital signs stable. BP lower limits. Stopped Amlodipine and Clonidine. Follow bp, better. Continue renal diet    Medications reviewed. Monitor anemia levels. 1. ESRD on iHD: MWF ,  - follows w/ Dr. Milton Roldan- [facility] dialysis center   - access: [facility]       - outpt HD center: [facility], Dr Milton Roldan   - recently started HD in 2021, during admission w/ ATN w/ KINZA on CKD-4, was lost for f/u,), had acute hypoxic respiratory failure, pneumonia - needing intubation in past   - missed HD x2  - encouraged compliance as if her solutes are better control, she may feel overall better    - so needed HD on admission during inpatient stay  - now admitted to ARU for inpatient rehab    Labs MWF - f/u K   Added Low K diet  Decreased Lisinopril 40 -> 20 mg QD      EDW listed 88 (but per pt 85 kg)     2. Hypertension/Volume Status:+edema.  - BP HTN - hx of resistant HTN - slightly uncontrolled - f/u after HD + resume home meds   - EDW reported 88  -> 83 kg currently - so might be her new DW  - Na - chronic hypoNatremia - f/u w/ HD    - bp low, meds adjusted. Now only on Lisinopril 10mg daily.       3. Electrolytes/acid-base:  K: WNL  High AG metabolic acidosis: mild - f/u w/ HD    Hyponatremia- fluid restriction    4. Anemia of renal failure: at goal  - RIA: w/ HD     5.  BMD:  - Phos: follow iPTH outpt         Other Problems:  Recurrent falls [R29.6]       Dillon Fleming MD  [facility] [address] [place]: [phone] or [address]  Fax: [phone]     Subjective  No complaints today      Vitals:    01/19/22 0905   BP: (!) 147/80   Pulse: 75   Resp: 16   Temp: 97.8 °F (36.6 °C)   SpO2: 98%     PE  Gen-appears well  HEENT-anicteric sclerae  Chest-clear  Heart-regular  Abd-soft  Ext-1+edema, decreased         Labs Reviewed  by me   Labs   Lab Results   Component Value Date    CREATININE 5.8 (HH) 01/19/2022    BUN 56 (H) 01/19/2022     (L) 01/19/2022    K 4.8 01/19/2022    CL 93 (L) 01/19/2022    CO2 24 01/19/2022     Lab Results   Component Value Date    WBC 9.9 01/19/2022    HGB 9.1 (L) 01/19/2022    HCT 27.8 (L) 01/19/2022    MCV 79.6 (L) 01/19/2022     01/19/2022       Dialysis Treatment and Prescription reviewed

## 2022-01-19 NOTE — PROGRESS NOTES
Jeremie Willis  1/19/2022  [id number]    Chief Complaint: Recurrent falls    Subjective:   No overnight events. No current complaints. Participating well with therapy. Leukocytosis resolved. ROS: No CP, SOB, dyspnea    Objective:  Patient Vitals for the past 24 hrs:   BP Temp Temp src Pulse Resp SpO2 Weight   01/19/22 0525 -- -- -- -- 16 98 % --   01/19/22 0355 -- -- -- -- -- -- 195 lb 1.7 oz (88.5 kg)   01/18/22 2130 (!) 155/78 97.7 °F (36.5 °C) Oral 78 16 95 % --     Gen: No distress, pleasant. Resting on therapy mat  HEENT: Normocephalic, atraumatic  CV: Regular rate and rhythm. No MRG   Resp: No respiratory distress. CTAB   Abd: Soft, nontender nondistended  Ext: No edema. Neuro: Alert, oriented, appropriately interactive. Laboratory data: Available via EMR. Therapy progress:  PT  Position Activity Restriction  Other position/activity restrictions: 20-year-old female with a history of ESRD on HD, diabetes, diabetic neuropathy, legal blindness, CVA with resultant right hemiparesis, HTN, and HLD who was admitted on 1/4 with recurrent falls. Due to increasing difficulty with ambulation and transfers, she had missed 2 dialysis sessions prior to admission. She was recently discharged on 12/29 for an admission with generalized weakness and falls consistent with this admission. She scored well enough with therapy evaluations to discharge to home however it does not appear she is able to care for herself with the assistance of her . She was evaluated by therapy and suggested to continue in an inpatient setting prior to returning home. Patient reports that she had her initial stroke in August and discharged to home with outpatient therapies and subsequently her second stroke in late 2021 resulted in her discharging to [address]. She felt that her therapies were not as effective or intensive as her outpatient therapies. She reports no current complaints.   PT Equipment Recommendations  Equipment Needed: Yes  Mobility Devices: Belen Dragon: Rolling  Objective     Bridging: Independent  Scooting: Minimal assistance (Required assistance scooting (L) + (R) to position straight. Independent scooting up in bed)  Sit to Stand: Stand by assistance  Stand to sit: Stand by assistance (VC for proper allignment and safety)  Ambulation  Device: Rolling Walker  Assistance: Contact guard assistance  Distance: 50' (max distance not attempted)  Assessment   Assessment: Pt continuing to improve independence with functional mobility. Floot=> chair transfer trialed this date @ [facility] x 1. Still requires SBA for transfers and VC for attention to task. Pt would continue to benefit from skilled physical therapy yo reinforce safety education and improve functional independence to decrease number of falls. OT  Objective  Feeding: Setup  UE Bathing: Setup,Verbal cueing,Contact guard assistance  LE Bathing: Setup,Verbal cueing,Contact guard assistance  UE Dressing: Setup,Stand by assistance (gown doffed and sweatshirt donned seated EOB)  LE Dressing: Contact guard assistance (SBA/CGA for seated donning of socks/shoes and threading LEs; CGA for balance for clothing over hips in stance)  Toileting: Dependent/Total  Toilet - Technique: Ambulating  Equipment Used: Extra wide bedside commode  Toilet Transfer: Minimal assistance     Sit to stand: Contact guard assistance,Stand by assistance  Stand to sit: Contact guard assistance,Stand by assistance  Toilet - Technique: Ambulating  Equipment Used: Extra wide bedside commode  Assessment   Assessment: Pt is well below her baseline level of occupational function, based on the above deficits associated with debility and recurrent. Pt has a chronic visual deficit. Pt would benefit from continued skilled acute OT services to address these deficits.  Pt is progressing well, demo's increased ability to complete LB tasks and requiring less VCs to attend to activities. pt continues to demo intermittent LOB however transfers and mobility abilities are progressing. Continue POC    SLP  Cognitive Diagnosis: Pt continues to present with mild cognitive linguistic deficits. High accuracy with naming tasks but continues to complain of effortful word production. Higher level cognitive-linguistic tasks are limited due to visual deficits. Speech Diagnosis: Pt continues to present with slow speech production and hoarse vocal quality at baseline. Will continue to assess pt's speech sound production and treat as appropriate. Body mass index is 30.56 kg/m².     Assessment:  Patient Active Problem List   Diagnosis    Type 2 diabetes mellitus, with long-term current use of insulin (HCC)    Mixed hyperlipidemia    Migraine headache    Anemia    Diabetic foot infection ([address]    Pyogenic inflammation of bone ([address]    History of medication noncompliance    Osteomyelitis of left foot (HCC)    Nephrotic syndrome    Peripheral edema    Pulmonary edema    Right sided numbness    Tobacco dependence    Chronic kidney disease (CKD), stage III (moderate) (HCC)    Chronic diastolic (congestive) heart failure (HCC)    History of CVA (cerebrovascular accident)    Arterial ischemic stroke, ICA, left, acute ([address]    HTN (hypertension), benign    DM (diabetes mellitus), secondary, uncontrolled, w/neurologic complic ([address]    Dyslipidemia    Smoker    Panic disorder    Isolated proteinuria    Acute on chronic diastolic heart failure (HCC)    Diabetic peripheral neuropathy (HCC)    Acute respiratory failure (HCC)    Depression    Abnormal gait    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, associated with type 2 diabetes mellitus ([address]    Cellulitis of right foot    Hidradenitis suppurativa    Hypocalcemia    Non-toxic multinodular goiter    Polyneuropathy due to type 2 diabetes mellitus ([facility]    Proliferative diabetic retinopathy associated with type 2 diabetes mellitus ([facility]    ESRD (end stage renal disease) ([facility]    Acute respiratory failure with hypoxemia (HCC)    Acute respiratory failure due to COVID-19 ([facility]    Suspected COVID-19 virus infection    Epiglottitis    Recurrent falls       Plan:   Debility: PT/OT     Recurrent falls: PT/OT     ESRD on HD: MWF, Nephro following. AVF creation 2/10.      DM: lantus decreased to 17, SSI     DM neuropathy: lyrica 75     H/O CVA: resultant R hemiparesis per report but none significant on exam     HTN: Propranolol 80. Discontinued norvasc 10, clonidine 0.2, lisinopril 40, spironolactone 50. Nephro managing     HLD: Lipitor 40     Anx/Dep: wellbutrin , xanax PRN, resumed home luvox 100 HS    Staph epi UTI: sensitive to bactrim    Hyperkalemia: per HD     Bowels: Per protocol  Bladder: Per protocol   Sleep: Trazodone provided prn. Pain: tylenol, tramadol PRN   DVT PPx: Heparin    GILSON: 1/21    Ivania Kovacs MD 1/19/2022, 8:59 AM    * This document was created using dictation software. While all precautions were taken to ensure accuracy, errors may have occurred. Please disregard any typographical errors.

## 2022-01-19 NOTE — PLAN OF CARE
Problem: Falls - Risk of:  Goal: Will remain free from falls  Description: Will remain free from falls  1/18/2022 2250 by Ayesha Caceres RN  Outcome: Ongoing     Problem: Falls - Risk of:  Goal: Absence of physical injury  Description: Absence of physical injury  Outcome: Ongoing     Problem: Nutrition  Goal: Optimal nutrition therapy  1/18/2022 2250 by Ayesha Caceres RN  Outcome: Ongoing     Problem: SKIN INTEGRITY  Goal: STG - Patient demonstrates preventative skin care measures  Outcome: Ongoing     Problem: Pain:  Goal: Pain level will decrease  Description: Pain level will decrease  1/18/2022 2250 by Ayesha Caceres RN  Outcome: Ongoing

## 2022-01-19 NOTE — PROGRESS NOTES
Occupational Therapy  Facility/Department: [facility] ACUTE REHAB UNIT  Daily Treatment Note  NAME: Faisal Johnson  : 1975  MRN: [id number]    Date of Service: 2022    Discharge Recommendations:  Home with Home health OT,24 hour supervision or assist,S Level 3  OT Equipment Recommendations  Equipment Needed: Yes  Other: walker tray and/or basket for increased safety during item transportation; continue to further update/assess pending progress    Assessment   Performance deficits / Impairments: Decreased functional mobility ; Decreased ADL status; Decreased endurance;Decreased balance;Decreased vision/visual deficit; Decreased safe awareness  Assessment: Pt is well below her baseline level of occupational function, based on the above deficits associated with debility and recurrent. Pt has a chronic visual deficit. Pt would benefit from continued skilled acute OT services to address these deficits. Pt is progressing well, demo's increased ability to complete LB tasks and requiring less VCs to attend to activities. pt continues to demo intermittent LOB however transfers and mobility abilities are progressing. Continue POC  Treatment Diagnosis: Decreased ADL status, functional mobility, endurance, balance, and safety awareness associated with ESRD, debility,and recurrent falls  Prognosis: Good  OT Education: OT Role;Plan of Care;ADL Adaptive Strategies;Transfer Training;Precautions; Family Education  Patient Education: Patient verbalized understanding, reinforcement for increased carryover  Barriers to Learning: Visual  REQUIRES OT FOLLOW UP: Yes  Activity Tolerance  Activity Tolerance: Patient Tolerated treatment well  Safety Devices  Safety Devices in place: Yes  Type of devices: Left in chair; All fall risk precautions in place;Call light within reach; Chair alarm in place         Patient Diagnosis(es): Debility       has a past medical history of Blind in both eyes, Cerebral artery occlusion with cerebral infarction [facility]), CHF (congestive heart failure) ([facility] Chronic kidney disease, Depression, Diabetes mellitus out of control ([facility] Diabetes mellitus, type II ([facility] Diabetic neuropathy associated with type 2 diabetes mellitus ([facility] Generalized headaches, Hypertension, Infertility, Insomnia, Migraine headache, Mixed hyperlipidemia, Otitis media, Pelvic abscess in female, Pneumonia, Stroke [facility]), and Stroke ([facility] has a past surgical history that includes Cervix surgery; eye surgery; Foot surgery (Right); Foot surgery (Bilateral); Foot surgery (Left); and IR TUNNELED CVC PLACE WO SQ PORT/PUMP > 5 YEARS (9/7/2021). Restrictions  Restrictions/Precautions  Restrictions/Precautions: Fall Risk  Required Braces or Orthoses?: No  Position Activity Restriction  Other position/activity restrictions: 54-year-old female with a history of ESRD on HD, diabetes, diabetic neuropathy, legal blindness, CVA with resultant right hemiparesis, HTN, and HLD who was admitted on 1/4 with recurrent falls. Due to increasing difficulty with ambulation and transfers, she had missed 2 dialysis sessions prior to admission. She was recently discharged on 12/29 for an admission with generalized weakness and falls consistent with this admission. She scored well enough with therapy evaluations to discharge to home however it does not appear she is able to care for herself with the assistance of her . She was evaluated by therapy and suggested to continue in an inpatient setting prior to returning home. Patient reports that she had her initial stroke in August and discharged to home with outpatient therapies and subsequently her second stroke in late 2021 resulted in her discharging to [address]. She felt that her therapies were not as effective or intensive as her outpatient therapies. She reports no current complaints.     Subjective   General  Chart Reviewed: Yes  Patient assessed for rehabilitation services?: Yes  Response to previous treatment: Patient with no complaints from previous session  Family / Caregiver Present: Yes (pt's spouse)  Diagnosis: Debility, Recurrent Falls  Subjective  Subjective: Pt in recliner at entry, agreeable to session  Vital Signs  Patient Currently in Pain: Denies          Objective    ADL  Grooming: Stand by assistance (oral care and face washing)  UE Bathing: Setup;Stand by assistance;Verbal cueing  LE Bathing: Setup;Stand by assistance;Verbal cueing  UE Dressing: Setup;Supervision  LE Dressing: Setup;Stand by assistance (assist with tying R shoe only)  Additional Comments: Pt ambulated to/from bathroom with RW for ADL needs- pt alternated sitting/standing for dressing/bathing tasks with use of counter top for support as needed- intermittent cues for attention to task during conversations- pt/spouse educated on dressing/bathing safety and recommendations for dressing/bathing being completed on lower/supportive surfaces vs EOB for increased pt safety/reduce fall risk. Balance  Sitting Balance: Supervision  Standing Balance: Contact guard assistance (CGA to SBA- VCs to correct posterior lean at times)  Standing Balance  Time: ~10 min total  Activity: ADL completion, mobility, transfers    Functional Mobility  Functional - Mobility Device: Rolling Walker  Activity: To/from bathroom  Assist Level: Stand by assistance        Transfers  Sit to stand: Stand by assistance  Stand to sit: Stand by assistance            Cognition  Arousal/Alertness: Appropriate responses to stimuli  Following Commands: Follows one step commands consistently; Follows one step commands with increased time  Attention Span: Attends with cues to redirect  Memory: Appears intact  Safety Judgement: Decreased awareness of need for assistance;Decreased awareness of need for safety  Problem Solving: Assistance required to generate solutions;Assistance required to implement solutions  Insights: Decreased awareness of deficits  Initiation: Requires cues for some  Sequencing: Requires cues for some      Second Session: Pt seated in recliner upon entry, pleasant and agreeable to OT. Pt denies pain, declines ADLs at this time. Pt completed the following activities in order to address dynamic standing balance: pt completed reciprocal ball toss in stance x25 reps x2 (pt w/ multiple posterior LOB w/ CGA for correction, pt w/ decreased reaction time, decreased visual scanning skills during task), pt completed cone reaching at various heights x10 reps x2 (no LOB noted, however pt w/ decreased speed during task, decreased visual scanning skills). Seated rest breaks incorporated throughout. Discussed mobility devices w/ pt-pt reporting that she is expecting to be discharged w/ 4ww (current PT recommendation is rw)-pt reports that she has difficulty ambulating long distances for community mobility-discussed w/ PT and will be addressed in later session, if pt is not safe w/ 4ww, may benefit from transport chair. Pt seated in recliner at EOS, chair alarm on, needs within reach.         Plan   Plan  Times per week: 75 minutes 5 days per week  Times per day: Daily  Current Treatment Recommendations: Safety Education & Training,Self-Care / Milton Babinski Mobility Training,Balance Training,Patient/Caregiver Education & Training,Equipment Evaluation, Education, & procurement            Goals  Short term goals  Time Frame for Short term goals: 2 weeks  Short term goal 1: SBA functional transfers to toilet and shower-- not met, progressing  Short term goal 2: SBA UB ADLs-- goal met 1/19  Short term goal 3: min A LB ADLs-- CGA 1/14 for socks and shoes, goal met 1/18  Short term goal 4: CGA functional mobility for ADLs-- goal met 1/18  Short term goal 5: NEW GOAL: SUP for LB ADLs  Short term goal 6: NEW GOAL: SUP for functional mobility for ADL completion  Patient Goals   Patient goals : to get stronger, stop falling       Therapy Time   Individual Concurrent Group Co-treatment   Time In 0915         Time Out 1000         Minutes 45         Timed Code Treatment Minutes: 45 Minutes       Total Treatment Minutes:  [facility], OTR/L #[id number]    Second Session Therapy Time:    Individual Concurrent Group Co-treatment   Time In 1030      Time Out 1100      Minutes 30        Timed Code Treatment Minutes:  45 + 30     Total Treatment Minutes:  75 minutes     Crystal Betancourt OTR/L, [facility] #[id number]  (Treatment and documentation for second session only)

## 2022-01-19 NOTE — PROGRESS NOTES
Physical Therapy  Facility/Department: [facility] ACUTE REHAB UNIT  Daily Treatment Note  NAME: Carlos A Oh  : 1975  MRN: [id number]    Date of Service: 2022    Discharge Recommendations:  24 hour supervision or assist,Home with Home health PT   PT Equipment Recommendations  Equipment Needed: Yes  Walker: Rolling    Assessment   Body structures, Functions, Activity limitations: Decreased functional mobility ; Decreased posture;Decreased ADL status; Decreased endurance;Decreased sensation;Decreased strength;Decreased balance;Vestibular Impairment;Decreased safe awareness;Decreased ROM  Assessment: Pt continuing to improve independence with functional mobility. Floor=> chair transfer trialed this date in response to discussion/request of  yesterday requiring extensive assist of 1 for completion on therapy mat . Still requires safety cueing during transfers for attention to task. Pt would continue to benefit from skilled physical therapy to reinforce safety education and improve functional independence to decrease number of falls. Treatment Diagnosis: impaired balance, impaired gait, generalized weakness  Prognosis: Good  PT Education: PT Role;Transfer Training;Functional Mobility Training;Plan of Care; Injury Prevention;Gait Training;Goals; General Safety; Energy Conservation  Patient Education: Patient verbalized understanding, would benefit from continued reinforcement  Barriers to Learning: visual deficits, cognitive deficits  REQUIRES PT FOLLOW UP: Yes  Activity Tolerance  Activity Tolerance: Patient Tolerated treatment well     Patient Diagnosis(es): Recurrent Falls   has a past medical history of Blind in both eyes, Cerebral artery occlusion with cerebral infarction ([facility] [address] CHF (congestive heart failure) ([facility] [address] Chronic kidney disease, Depression, Diabetes mellitus out of control ([address] Diabetes mellitus, type II ([address] Diabetic neuropathy associated with type 2 diabetes mellitus ([facility] [address] Generalized headaches, Hypertension, Infertility, Insomnia, Migraine headache, Mixed hyperlipidemia, Otitis media, Pelvic abscess in female, Pneumonia, Stroke [facility]), and Stroke ([facility] [address] has a past surgical history that includes Cervix surgery; eye surgery; Foot surgery (Right); Foot surgery (Bilateral); Foot surgery (Left); and IR TUNNELED CVC PLACE WO SQ PORT/PUMP > 5 YEARS (9/7/2021). Restrictions  Restrictions/Precautions  Restrictions/Precautions: Fall Risk  Required Braces or Orthoses?: No  Position Activity Restriction  Other position/activity restrictions: 26-year-old female with a history of ESRD on HD, diabetes, diabetic neuropathy, legal blindness, CVA with resultant right hemiparesis, HTN, and HLD who was admitted on 1/4 with recurrent falls. Due to increasing difficulty with ambulation and transfers, she had missed 2 dialysis sessions prior to admission. She was recently discharged on 12/29 for an admission with generalized weakness and falls consistent with this admission. She scored well enough with therapy evaluations to discharge to home however it does not appear she is able to care for herself with the assistance of her . She was evaluated by therapy and suggested to continue in an inpatient setting prior to returning home. Patient reports that she had her initial stroke in August and discharged to home with outpatient therapies and subsequently her second stroke in late 2021 resulted in her discharging to [address]. She felt that her therapies were not as effective or intensive as her outpatient therapies. She reports no current complaints. Subjective   General  Chart Reviewed: Yes  Additional Pertinent Hx: Hx of CVA x 2 with multiple falls in home enviornment  Response To Previous Treatment: Patient with no complaints from previous session. Family / Caregiver Present: No  Referring Practitioner: Dr. Huang Clifford  Subjective  Subjective: Pt agreeable to PT.  Reports she is concerned with weeks  Short term goal 1: Pt to complete bed mobility at modified independent level with use of bed rail. Short term goal 2: Pt to complete transfers at [facility]  Short term goal 3: Pt to ambulate 150 ft with RW at [facility]  Short term goal 4: Pt to ascend/descend 4 steps with (B) HR at [facility]  Short term goal 5: Pt to complete car transfer at [facility]  Patient Goals   Patient goals : To reduce falling episodes    Plan    Plan  Times per week: 5x/week, 75 min/day  Times per day: Daily  Current Treatment Recommendations: Strengthening,Balance Training,Functional Mobility Training,Transfer Training,ADL/Self-care Training,Stair training,Endurance Training,Gait Training,Neuromuscular Re-education,Positioning,Modalities,Patient/Caregiver Education & Training,Safety Education & Training  Safety Devices  Type of devices: All fall risk precautions in place,Call light within reach,Gait belt,Chair alarm in place,Left in chair  Restraints  Initially in place: No     Therapy Time   Individual Concurrent Group Co-treatment   Time In 0729         Time Out 0818         Minutes 49         Timed Code Treatment Minutes: 52 Minutes         Second Session Therapy Time:   Individual Concurrent Group Co-treatment   Time In 1249         Time Out 1324         Minutes 35           Timed Code Treatment Minutes:  35    Total Treatment Minutes:  52 + 28 = 137 [facility]  Therapist observed and directed the patient's plan of care.   Co-signed and supervised by: Alex Gee PT, DPT - [id number]

## 2022-01-19 NOTE — PLAN OF CARE
Problem: Falls - Risk of:  Goal: Will remain free from falls  Description: Will remain free from falls  1/19/2022 0948 by Dada Soni RN  Outcome: Ongoing  Note: Education Provided as follows:  Family/Patient made aware of the tailored interventions falls plan using the TIPS TOOL.       Problem: Skin Integrity:  Goal: Absence of new skin breakdown  Description: Absence of new skin breakdown  Outcome: Ongoing        Problem: Pain:  Goal: Pain level will decrease  Description: Pain level will decrease  1/19/2022 0948 by Dada Soni RN  Outcome: Ongoing

## 2022-01-20 LAB
GLUCOSE BLD-MCNC: 144 MG/DL (ref 70–99)
GLUCOSE BLD-MCNC: 149 MG/DL (ref 70–99)
GLUCOSE BLD-MCNC: 84 MG/DL (ref 70–99)
GLUCOSE BLD-MCNC: 99 MG/DL (ref 70–99)
PERFORMED ON: ABNORMAL
PERFORMED ON: ABNORMAL
PERFORMED ON: NORMAL
PERFORMED ON: NORMAL

## 2022-01-20 PROCEDURE — 2580000003 HC RX 258: Performed by: PHYSICAL MEDICINE & REHABILITATION

## 2022-01-20 PROCEDURE — 1280000000 HC REHAB R&B

## 2022-01-20 PROCEDURE — 94761 N-INVAS EAR/PLS OXIMETRY MLT: CPT

## 2022-01-20 PROCEDURE — 6360000002 HC RX W HCPCS: Performed by: PHYSICAL MEDICINE & REHABILITATION

## 2022-01-20 PROCEDURE — 97535 SELF CARE MNGMENT TRAINING: CPT

## 2022-01-20 PROCEDURE — 94640 AIRWAY INHALATION TREATMENT: CPT

## 2022-01-20 PROCEDURE — 97116 GAIT TRAINING THERAPY: CPT

## 2022-01-20 PROCEDURE — 97530 THERAPEUTIC ACTIVITIES: CPT

## 2022-01-20 PROCEDURE — 6370000000 HC RX 637 (ALT 250 FOR IP): Performed by: PHYSICAL MEDICINE & REHABILITATION

## 2022-01-20 PROCEDURE — 6370000000 HC RX 637 (ALT 250 FOR IP): Performed by: INTERNAL MEDICINE

## 2022-01-20 PROCEDURE — 97129 THER IVNTJ 1ST 15 MIN: CPT

## 2022-01-20 PROCEDURE — 97130 THER IVNTJ EA ADDL 15 MIN: CPT

## 2022-01-20 RX ADMIN — HEPARIN SODIUM 5000 UNITS: 5000 INJECTION INTRAVENOUS; SUBCUTANEOUS at 05:26

## 2022-01-20 RX ADMIN — ALPRAZOLAM 0.75 MG: 0.5 TABLET ORAL at 21:35

## 2022-01-20 RX ADMIN — INSULIN LISPRO 2 UNITS: 100 INJECTION, SOLUTION INTRAVENOUS; SUBCUTANEOUS at 11:46

## 2022-01-20 RX ADMIN — SULFAMETHOXAZOLE AND TRIMETHOPRIM 1 TABLET: 800; 160 TABLET ORAL at 08:26

## 2022-01-20 RX ADMIN — TIOTROPIUM BROMIDE AND OLODATEROL 2 PUFF: 3.124; 2.736 SPRAY, METERED RESPIRATORY (INHALATION) at 05:09

## 2022-01-20 RX ADMIN — ALPRAZOLAM 0.75 MG: 0.5 TABLET ORAL at 05:25

## 2022-01-20 RX ADMIN — ACETAMINOPHEN 650 MG: 325 TABLET ORAL at 05:25

## 2022-01-20 RX ADMIN — PROPRANOLOL HYDROCHLORIDE 80 MG: 80 CAPSULE, EXTENDED RELEASE ORAL at 08:26

## 2022-01-20 RX ADMIN — HEPARIN SODIUM 5000 UNITS: 5000 INJECTION INTRAVENOUS; SUBCUTANEOUS at 14:10

## 2022-01-20 RX ADMIN — INSULIN GLARGINE 17 UNITS: 100 INJECTION, SOLUTION SUBCUTANEOUS at 08:28

## 2022-01-20 RX ADMIN — ALPRAZOLAM 0.75 MG: 0.5 TABLET ORAL at 08:25

## 2022-01-20 RX ADMIN — BENZONATATE 200 MG: 100 CAPSULE ORAL at 21:35

## 2022-01-20 RX ADMIN — FLUVOXAMINE MALEATE 100 MG: 50 TABLET, COATED ORAL at 21:29

## 2022-01-20 RX ADMIN — ASPIRIN 81 MG: 81 TABLET, COATED ORAL at 08:27

## 2022-01-20 RX ADMIN — PREGABALIN 75 MG: 75 CAPSULE ORAL at 21:28

## 2022-01-20 RX ADMIN — BENZONATATE 200 MG: 100 CAPSULE ORAL at 05:25

## 2022-01-20 RX ADMIN — INSULIN LISPRO 1 UNITS: 100 INJECTION, SOLUTION INTRAVENOUS; SUBCUTANEOUS at 21:36

## 2022-01-20 RX ADMIN — LISINOPRIL 10 MG: 10 TABLET ORAL at 08:27

## 2022-01-20 RX ADMIN — HEPARIN SODIUM 5000 UNITS: 5000 INJECTION INTRAVENOUS; SUBCUTANEOUS at 21:28

## 2022-01-20 RX ADMIN — BUPROPION HYDROCHLORIDE 150 MG: 150 TABLET, EXTENDED RELEASE ORAL at 08:27

## 2022-01-20 RX ADMIN — SODIUM CHLORIDE, PRESERVATIVE FREE 10 ML: 5 INJECTION INTRAVENOUS at 08:27

## 2022-01-20 RX ADMIN — ATORVASTATIN CALCIUM 40 MG: 40 TABLET, FILM COATED ORAL at 21:28

## 2022-01-20 ASSESSMENT — PAIN SCALES - GENERAL
PAINLEVEL_OUTOF10: 0
PAINLEVEL_OUTOF10: 4
PAINLEVEL_OUTOF10: 0

## 2022-01-20 ASSESSMENT — PAIN DESCRIPTION - ORIENTATION: ORIENTATION: LEFT;RIGHT

## 2022-01-20 ASSESSMENT — PAIN DESCRIPTION - PAIN TYPE: TYPE: ACUTE PAIN

## 2022-01-20 ASSESSMENT — PAIN DESCRIPTION - LOCATION: LOCATION: KNEE

## 2022-01-20 ASSESSMENT — PAIN DESCRIPTION - FREQUENCY: FREQUENCY: INTERMITTENT

## 2022-01-20 ASSESSMENT — PAIN DESCRIPTION - DESCRIPTORS: DESCRIPTORS: SORE

## 2022-01-20 NOTE — PROGRESS NOTES
MD Hola Breaux MD Bula Lingo, MD                Office: [phone]                      Fax: [phone]          Inpatient  Progress Note:     PATIENT NAME: Janee Duke  : 1975  MRN: [id number]         IMPRESSION / RECOMMENDATION:       Admitted for:  Recurrent falls [R29.6]    ESRD-tolerating dialysis well. HD MWF. HD tomorrow, EDW decreased. Hyponatremia-follow  Metabolic acidosis-follow with HD  Anemia due to CKD-RIA with HD    Vital signs stable. BP lower limits. Stopped Amlodipine and Clonidine. Follow bp, at goal today. Continue renal diet    Medications reviewed. Monitor anemia levels. 1. ESRD on iHD: MWF ,  - follows w/ Dr. Fred Ferrera- [facility] dialysis center   - access: [facility] [place], operated by [facility]       - outpt HD center: [facility], Dr Fred Ferrera   - recently started HD in 2021, during admission w/ ATN w/ KINZA on CKD-4, was lost for f/u,), had acute hypoxic respiratory failure, pneumonia - needing intubation in past   - missed HD x2  - encouraged compliance as if her solutes are better control, she may feel overall better    - so needed HD on admission during inpatient stay  - now admitted to [facility] for inpatient rehab    Labs MWF - f/u K   Added Low K diet  Decreased Lisinopril 40 -> 20 mg QD      EDW listed 88 (but per pt 85 kg)     2. Hypertension/Volume Status:+edema. Now appears euvolemic.   - BP HTN - hx of resistant HTN - slightly uncontrolled - f/u after HD + resume home meds   - EDW reported 88  -> 83 kg currently - so might be her new DW  - Na - chronic hypoNatremia - f/u w/ HD    - bp low, meds adjusted. Now only on Lisinopril 10mg daily.       3. Electrolytes/acid-base:  K: WNL  High AG metabolic acidosis: mild - f/u w/ HD    Hyponatremia- fluid restriction    4. Anemia of renal failure: at goal  - RIA: w/ HD     5.  BMD:  - Phos: follow iPTH outpt         Other Problems:  Recurrent falls [R29.6]       Dillon Davonte Brooks MD  [facility] [address] [place]: [phone] or [employer]  Fax: [phone]     Subjective  No complaints today      Vitals:    01/20/22 1145   BP: 120/71   Pulse: 79   Resp: 16   Temp: 98.4 °F (36.9 °C)   SpO2: 97%     PE  Gen-appears well  HEENT-anicteric sclerae  Chest-clear  Heart-regular  Abd-soft  Ext-1+edema, decreased         Labs Reviewed  by me   Labs   Lab Results   Component Value Date    CREATININE 5.8 (HH) 01/19/2022    BUN 56 (H) 01/19/2022     (L) 01/19/2022    K 4.8 01/19/2022    CL 93 (L) 01/19/2022    CO2 24 01/19/2022     Lab Results   Component Value Date    WBC 9.9 01/19/2022    HGB 9.1 (L) 01/19/2022    HCT 27.8 (L) 01/19/2022    MCV 79.6 (L) 01/19/2022     01/19/2022       Dialysis Treatment and Prescription reviewed

## 2022-01-20 NOTE — PROGRESS NOTES
Physical Therapy  Facility/Department: [facility] ACUTE REHAB UNIT  Daily Treatment Note / Discharge Summary  NAME: Codey Garcia  : 1975  MRN: [id number]    Date of Service: 2022    Discharge Recommendations:  24 hour supervision or assist,Outpatient PT   PT Equipment Recommendations  Equipment Needed: No  Other: Pt owns RW    Assessment   Body structures, Functions, Activity limitations: Decreased functional mobility ; Decreased posture;Decreased ADL status; Decreased endurance;Decreased sensation;Decreased strength;Decreased balance;Vestibular Impairment;Decreased safe awareness;Decreased ROM  Assessment: Pt made excellent progress in response to therapy services, meeting 4/5 set goals and making significant gains in remaining stair mobility goal.  Pt has progressed to independent level for bed mobility, and SBA for all level surface household mobility tasks with ongoing use of RW. Pt continues to require safety cueing to limit distractions within tasks and for low vision items including object avoidance. Therapy recommendation for supervision with all standing based mobility tasks upon discharge to be provided by spouse with transition to outpatient therapy services for further strengthening and balance training to further decrease risk of falling. Caregiver training has been completed with spouse in prior sessions to maximize patient performance and improve safety of both patient and caregiver. Treatment Diagnosis: impaired balance, impaired gait, generalized weakness  Prognosis: Good  PT Education: PT Role;Transfer Training;Functional Mobility Training;Plan of Care;Gait Training;Goals; Low Vision Education;General Safety  Patient Education: Discharge recommendations reviewed. Patient verbalized understanding. Formal education provided to  (caregiver in home) in prior sessions.   Barriers to Learning: visual deficits, cognitive deficits  REQUIRES PT FOLLOW UP: Yes  Activity Tolerance  Activity Tolerance: Patient Tolerated treatment well     Patient Diagnosis(es): Recurrent Falls     has a past medical history of Blind in both eyes, Cerebral artery occlusion with cerebral infarction ([address] CHF (congestive heart failure) ([address] Chronic kidney disease, Depression, Diabetes mellitus out of control ([address] Diabetes mellitus, type II ([address] Diabetic neuropathy associated with type 2 diabetes mellitus ([address] Generalized headaches, Hypertension, Infertility, Insomnia, Migraine headache, Mixed hyperlipidemia, Otitis media, Pelvic abscess in female, Pneumonia, Stroke ([address] and Stroke ([address] has a past surgical history that includes Cervix surgery; eye surgery; Foot surgery (Right); Foot surgery (Bilateral); Foot surgery (Left); and IR TUNNELED CVC PLACE WO SQ PORT/PUMP > 5 YEARS (9/7/2021). Restrictions  Restrictions/Precautions  Restrictions/Precautions: Fall Risk  Required Braces or Orthoses?: No  Position Activity Restriction  Other position/activity restrictions: 55-year-old female with a history of ESRD on HD, diabetes, diabetic neuropathy, legal blindness, CVA with resultant right hemiparesis, HTN, and HLD who was admitted on 1/4 with recurrent falls. Due to increasing difficulty with ambulation and transfers, she had missed 2 dialysis sessions prior to admission. She was recently discharged on 12/29 for an admission with generalized weakness and falls consistent with this admission. She scored well enough with therapy evaluations to discharge to home however it does not appear she is able to care for herself with the assistance of her . She was evaluated by therapy and suggested to continue in an inpatient setting prior to returning home. Patient reports that she had her initial stroke in August and discharged to home with outpatient therapies and subsequently her second stroke in late 2021 resulted in her discharging to [address]   She felt that her therapies were not as effective or intensive as her outpatient therapies. She reports no current complaints. Subjective   General  Chart Reviewed: Yes  Additional Pertinent Hx: Hx of CVA x 2 with multiple falls in home enviornment  Response To Previous Treatment: Patient with no complaints from previous session. Family / Caregiver Present: No  Referring Practitioner: Dr. Rory Kessler  Subjective  Subjective: Pt denies pain. Reporting increased hallucinations last night in which patient earlier discussed with MD.  General Comment  Comments: Pt seated in recliner upon arrival, agreeable to therapy session. Objective   Bed mobility  Bridging: Independent  Rolling to Left: Independent  Rolling to Right: Independent  Supine to Sit: Independent  Sit to Supine: Independent  Comment: Bed mobility completed on standard flat bed without HR. Transfers  Sit to Stand: Stand by assistance (multiple completions within session including from recliner, low bed, and w/c surface)  Stand to sit: Stand by assistance  Stand Pivot Transfers: Stand by assistance  Comment: RW utilized for external support during all transfers. Ambulation  Ambulation?: Yes    Ambulation 1  Surface: level tile  Device: Rolling Walker  Assistance: Stand by assistance  Quality of Gait: reciprocal pattern with reduced noble. narrow ROMAINE with decreased stride length. ongoing mild FWD flexed posture onto RW. Distance: 200' + short distances within session  Comments: VC for object negotiation due to unfamiliar enviornment and visual deficits  Ambulation 2  Surface - 2: carpet    Device 2: Rolling Walker  Assistance 2: Stand by assistance  Quality of Gait 2: No significant change in mechanics from level surfaces. Mild increase in noble and reported confidence.   Distance: 20'  Stairs/Curb  Stairs?: Yes  Stairs  # Steps : 12  Stairs Height: 6\"  Rails: Bilateral  Assistance: Contact guard assistance  Comment: Reciprocal pattern ascending and descending with increased time in double limb support. VC for improved ROMAINE width. Balance  Posture: Fair  Sitting - Static: Good;-  Sitting - Dynamic: Fair;+  Standing - Static: Fair;-  Standing - Dynamic: Fair;- (CGA/SBA with RW support)  Comments: Object retrieval completed from floor with use of RW and reacher at [facility]      Comment: 1st session:  See above functional mobility. 2nd session:  Pt seated in recliner upon arrival, denies pain, agreeable to PT session. Sit <=> stand transfers completed throughout session at [facility]. Pt ambulates 120 ft within session using RW at [facility] with same mechanics as first session. Car transfer completed with use of RW at [facility]. Sit <=> stand transfer training completed from low surface therapy mat 1 x 5. Lateral walking completed at ballet bar 1 x 10 ft ea without resistance, 1 x 10 ft ea with blue tband. UE lateral wall walk 2 x 10 ft ea at ballet bar. Standing unsupported 1 KG med ball lift waist <=> overhead. Attempted 6\" lateral step up with UE support, task terminated secondary to fatigue and difficulty sequencing movement. SPT w/c => bed with RW at [facility]. Upon completion of therapy session, pt requests to return to bed. Sit => supine transfer completed at modified independent level with HR and HOB elevated. Pt in bed with bed alarm activated and call light within reach. Goals  Short term goals  Time Frame for Short term goals: 2 weeks  Short term goal 1: Pt to complete bed mobility at modified independent level with use of bed rail. - goal met 1/20/22  Short term goal 2: Pt to complete transfers at A - goal met 1/20/22  Short term goal 3: Pt to ambulate 150 ft with RW at [facility] - goal met 1/20/22  Short term goal 4: Pt to ascend/descend 4 steps with (B) HR at SBA - goal not met  Short term goal 5: Pt to complete car transfer at [facility] - goal met 1/20/22  Patient Goals   Patient goals :  To reduce falling episodes    Plan    Plan  Times per week: 5x/week, 75 min/day  Times per day: Daily  Current Treatment Recommendations: Strengthening,Balance Training,Functional Mobility Training,Transfer Training,ADL/Self-care Training,Stair training,Endurance Training,Gait Training,Neuromuscular Re-education,Positioning,Modalities,Patient/Caregiver Education & Training,Safety Education & Training  Plan Comment: Plan for d/c to home on 1/21/22 with transition to outpatient therapy services. Safety Devices  Type of devices:  All fall risk precautions in place,Call light within reach,Gait belt,Chair alarm in place,Left in chair  Restraints  Initially in place: No     Therapy Time   Individual Concurrent Group Co-treatment   Time In 0915         Time Out 0945         Minutes 30         Timed Code Treatment Minutes: 30 Minutes     Second Session Therapy Time:   Individual Concurrent Group Co-treatment   Time In 4907         Time Out 1400         Minutes 45           Timed Code Treatment Minutes:  30 + 45     Total Treatment Minutes:  75 minutes    Harshad Gillespie PT, DPT - [id number]

## 2022-01-20 NOTE — PLAN OF CARE
Problem: Falls - Risk of:  Goal: Will remain free from falls  Description: Will remain free from falls  1/19/2022 1905 by Jaime Vega RN  Outcome: Ongoing  1/19/2022 0948 by Sergio Davis RN  Outcome: Ongoing  Note: Education Provided as follows:  Family/Patient made aware of the tailored interventions falls plan using the TIPS TOOL.    Goal: Absence of physical injury  Description: Absence of physical injury  1/19/2022 1905 by Jaime Vega RN  Outcome: Ongoing  1/19/2022 0948 by Sergio Davis RN  Outcome: Ongoing     Problem: Nutrition  Goal: Optimal nutrition therapy  1/19/2022 1905 by Jaime Vega RN  Outcome: Ongoing  1/19/2022 0948 by Sergio Davis RN  Outcome: Ongoing     Problem: IP BOWEL ELIMINATION  Goal: LTG - patient will have regular and routine bowel evacuation  1/19/2022 1905 by Jaime Vega RN  Outcome: Ongoing  1/19/2022 0948 by Sergio Davis RN  Outcome: Ongoing  Goal: STG - Patient will verbalize signs and symptoms of constipation and how to prevent/alleviate  1/19/2022 1905 by Jaime Vega RN  Outcome: Ongoing  1/19/2022 0948 by Sergio Davis RN  Outcome: Ongoing     Problem: IP BLADDER/VOIDING  Goal: STG - Patient will state signs and symptoms of UTI  1/19/2022 1905 by Jaime Vega RN  Outcome: Ongoing  1/19/2022 0948 by Sergio Davis RN  Outcome: Ongoing     Problem: SKIN INTEGRITY  Goal: LTG - Patient will be free from infection  1/19/2022 1905 by Jaime Vega RN  Outcome: Ongoing  1/19/2022 0948 by Sergio Davis RN  Outcome: Ongoing  Goal: STG - Patient demonstrates preventative skin care measures  1/19/2022 1905 by Jaime Vega RN  Outcome: Ongoing  1/19/2022 0948 by Sergio Davis RN  Outcome: Ongoing     Problem: Pain:  Goal: Pain level will decrease  Description: Pain level will decrease  1/19/2022 1905 by Jaime Vega RN  Outcome: Ongoing  1/19/2022 0948 by Sergio Davis RN  Outcome: Ongoing  Goal: Control of acute pain  Description: Control of acute pain  1/19/2022 1905 by Zackary Blanco RN  Outcome: Ongoing  1/19/2022 0948 by Sergio Ward RN  Outcome: Ongoing  Goal: Control of chronic pain  Description: Control of chronic pain  1/19/2022 1905 by Zackary Blanco RN  Outcome: Ongoing  1/19/2022 0948 by Sergio Ward RN  Outcome: Ongoing     Problem: Skin Integrity:  Goal: Will show no infection signs and symptoms  Description: Will show no infection signs and symptoms  1/19/2022 1905 by Zackary Blanco RN  Outcome: Ongoing  1/19/2022 0948 by Sergio Ward RN  Outcome: Ongoing  Goal: Absence of new skin breakdown  Description: Absence of new skin breakdown  1/19/2022 1905 by Zackary Blanco RN  Outcome: Ongoing  1/19/2022 0948 by Sergio Ward RN  Outcome: Ongoing     Problem:  Activity:  Goal: Fatigue will decrease  Description: Fatigue will decrease  1/19/2022 1905 by Zackary Blanco RN  Outcome: Ongoing  1/19/2022 0948 by Sergio Ward RN  Outcome: Ongoing  Goal: Risk for activity intolerance will decrease  Description: Risk for activity intolerance will decrease  1/19/2022 1905 by Zackary Blanco RN  Outcome: Ongoing  1/19/2022 0948 by Sergio Ward RN  Outcome: Ongoing

## 2022-01-20 NOTE — FLOWSHEET NOTE
01/19/22 1439 01/19/22 1809   Treatment   Time On 1457  --    Time Off  --  1804   Vital Signs   BP (!) 151/80 (!) 159/77   Temp 96.4 °F (35.8 °C) 96.9 °F (36.1 °C)   Pulse 78 78   Resp 16 16   Weight 195 lb 1.7 oz (88.5 kg) 190 lb 11.2 oz (86.5 kg)   Weight Method Bed scale Bed scale       Treatment time: 3 hours  Net UF: 2000 ml     Pre weight: 88.5 kg   Post weight: 86.5 kg  EDW: 86 kg     Access used: RTDC  Access function: good with  ml/min     Medications or blood products given: Retacrit     Regular outpatient schedule: [facility] MWF     Summary of response to treatment:  Tolerated tx well, no distress noted, MD aware. Copy of dialysis treatment record placed in chart, to be scanned into EMR.

## 2022-01-20 NOTE — PROGRESS NOTES
ACUTE REHAB UNIT  SPEECH/LANGUAGE PATHOLOGY      [x] Daily  [] Weekly Care Conference Note  [x] Discharge    Patient:Kristine Hairston     [facility]:[birth year]  TFY:[id number]  Room #: GFP-6734/2980-89   Rehab Dx/Hx: Recurrent falls [R29.6]  Date of Admit: 1/6/2022      Precautions: falls  Home situation: Lives at home with her ; Not an active ;  completes finances and places pills into pill organizer for pt)   ST Dx: Cognitive Linguistic Deficits     Daily Treatment Info:   Date: 1/20/2022     Tx session 1   Pain None reported    Subjective     Pt alert and up in chair eating breakfast during treatment session. Pt reported her hesitancy to return home is related to physical vs speech/cognitive deficits. Discharge summary: Pt received SLP services to target mild cognitive linguistic deficits and dysphonia. Pt demonstrates high accuracy with naming tasks but continues to complain of effortful word production. Higher level cognitive-linguistic tasks are limited due to visual deficits. Pt continues to present with slow speech production and hoarse vocal quality at baseline. Vocal intensity has positive effect on speech quality. Per discussion with Pt, she does not want to pursue continued SLP services at discharge. Recommend PT/OT to follow-up with problem solving/decision making in the home setting and if later warranted, recommend home health to place order for SLP services. Objective:  Goals    Pt will complete word associative tasks to improve mental flexibility, complex thinking, and working memory skills with 90% accuracy.    Pt reported improvement but ongoing difficulty with thought organization and intermittent episodes of anomia in conversation  - Education and training provided in use of circumlocution to facilitate communication breakdowns   - Pt demonstrated use of circumlocution in 5/5 out of instances independently      GOAL MET      Pt will complete executive function tasks (i.e. planning, deductive reasoning, inferential, functional organization tasks) with 80% accuracy independently. SLP discussed concern from therapists regarding problem solving and decision making impacting safety awareness  - Pt reported that she is able to assess the situation and realize it is unsafe but likes to stay independent therefore is willing to risk the outcome   - Education provided regarding onset of functional deficits and potential outcome of choices including injury or readmission to hospital. Pt verbalized understanding. GOAL MET     Continue to monitor speech sound production with additional goals to be added as warranted. Speech intensity fluctuates and appears related to medication / fatigue changes but overall pt's intelligibility has improved for adequate speech sound production. This date, pt rated speech as 10/10 in regards to breath support and intelligibility and 7/10 based on vocal quality. GOAL MET   Other areas targeted:    Education:   Provided education regarding rationale for tasks, progress with treatment and recommendations at discharge. Pt verbalized understanding    Safety Devices: [x] Call light within reach  [x] Chair alarm activated  [] Bed alarm activated  [] Other:     Assessment:   Weekly Update:   Speech Therapy Diagnosis  Cognitive Diagnosis: Pt continues to present with mild cognitive linguistic deficits. High accuracy with naming tasks but continues to complain of effortful word production. Higher level cognitive-linguistic tasks are limited due to visual deficits. Speech Diagnosis: Pt continues to present with slow speech production and hoarse vocal quality at baseline. Will continue to assess pt's speech sound production and treat as appropriate. Vocal intensity has positive effect on speech quality. Current Diet Order: ADULT DIET; Regular; 4 carb choices (60 gm/meal);  Low Potassium (Less than 3000 mg/day); 1500 ml          Plan: Frequency:  5days/week   30 minutes/day    Discharge Recommendations:   Barriers: Visual deficits (legally blind)   Discharge Recommendations:  [] Home independently  [x] Home with assistance []  24 hour supervision  [] SNF [] Other:  Continued SLP Treatment:  [] Yes [x] No [] TBD based on progress while on ARU [] Vital Stim indicated [] Other:   Estimated discharge date: 1/21/2022     Session 1 Times:     Individual Concurrent Group Co-treatment   Time In   0830         Time Out   0900         Minutes   30         Time Code Minutes  30        Timed Code Treatment Minutes: 30 Minutes     Total Treatment Minutes: 30 minutes    Electronically Signed by     Anali Joseph M.A., [address] [place] [address] [id number]  Speech-Language Pathologist  1/20/2022 10:16 AM

## 2022-01-20 NOTE — PROGRESS NOTES
Pt back to floor from dialysis, alert and oriented at baseline, voices no concerns, in room at this time, call button within reach, will cont to monitor

## 2022-01-20 NOTE — PROGRESS NOTES
Occupational Therapy  Facility/Department: [facility] ACUTE REHAB UNIT  Daily Treatment Note/Discharge Summary  NAME: Nancy Magallanes  : 1975  MRN: [id number]    Date of Service: 2022    Discharge Recommendations:  Home with Home health OT,24 hour supervision or assist,S Level 3  OT Equipment Recommendations  Equipment Needed: Yes  Other: walker tray and/or basket for increased safety during item transportation; reacher    Assessment   Performance deficits / Impairments: Decreased functional mobility ; Decreased ADL status; Decreased endurance;Decreased balance;Decreased vision/visual deficit; Decreased safe awareness  Assessment: Pt presented with with above deficits placing her well below her baseline level of occupational function. Pt has a chronic visual deficit and hx of 2 CVAs. Pt has progressed well throughout stay- now able to complete ADL tasks at SUP/SBA levels with overall less VCs for attention/initiation. Pt itnermittently required VCs for balance corrections at times. SBA recommended for light IADL tasks such as meal prep for increased safety. Pt has been introduced and educated on AE for item transportation and retrieval of small items from floor height. Pt would benefit from ongoing therapy post d/c to continue to maximize her independence and safety with all occupationas as well as reduce caregiver burden. Treatment Diagnosis: Decreased ADL status, functional mobility, endurance, balance, and safety awareness associated with ESRD, debility,and recurrent falls  Prognosis: Good  OT Education: OT Role;Plan of Care;ADL Adaptive Strategies;Transfer Training;Precautions; Equipment; Energy Conservation  Patient Education: Patient verbalized understanding, reinforcement for increased carryover  Barriers to Learning: Visual  REQUIRES OT FOLLOW UP: Yes  Activity Tolerance  Activity Tolerance: Patient Tolerated treatment well  Safety Devices  Safety Devices in place: Yes  Type of devices: Left in chair; All fall risk precautions in place;Call light within reach; Chair alarm in place         Patient Diagnosis(es): Debility      has a past medical history of Blind in both eyes, Cerebral artery occlusion with cerebral infarction ([address] CHF (congestive heart failure) ([address] Chronic kidney disease, Depression, Diabetes mellitus out of control ([address] Diabetes mellitus, type II ([address] Diabetic neuropathy associated with type 2 diabetes mellitus ([address] Generalized headaches, Hypertension, Infertility, Insomnia, Migraine headache, Mixed hyperlipidemia, Otitis media, Pelvic abscess in female, Pneumonia, Stroke ([address] and Stroke ([address] has a past surgical history that includes Cervix surgery; eye surgery; Foot surgery (Right); Foot surgery (Bilateral); Foot surgery (Left); and IR TUNNELED CVC PLACE WO SQ PORT/PUMP > 5 YEARS (9/7/2021). Restrictions  Restrictions/Precautions  Restrictions/Precautions: Fall Risk  Required Braces or Orthoses?: No  Position Activity Restriction  Other position/activity restrictions: 49-year-old female with a history of ESRD on HD, diabetes, diabetic neuropathy, legal blindness, CVA with resultant right hemiparesis, HTN, and HLD who was admitted on 1/4 with recurrent falls. Due to increasing difficulty with ambulation and transfers, she had missed 2 dialysis sessions prior to admission. She was recently discharged on 12/29 for an admission with generalized weakness and falls consistent with this admission. She scored well enough with therapy evaluations to discharge to home however it does not appear she is able to care for herself with the assistance of her . She was evaluated by therapy and suggested to continue in an inpatient setting prior to returning home. Patient reports that she had her initial stroke in August and discharged to home with outpatient therapies and subsequently her second stroke in late 2021 resulted in her discharging to [address]   She felt that her therapies were not as effective or intensive as her outpatient therapies. She reports no current complaints. Subjective   General  Chart Reviewed: Yes  Patient assessed for rehabilitation services?: Yes  Response to previous treatment: Patient with no complaints from previous session  Family / Caregiver Present: No  Diagnosis: Debility, Recurrent Falls  Subjective  Subjective: Pt in bed at entry, easy to wake, agreeable to session  Vital Signs  Patient Currently in Pain: Denies          Objective    ADL  Grooming: Modified independent  (oral care at sink)  UE Bathing: Setup;Supervision  LE Bathing: Supervision;Setup  UE Dressing: Modified independent   LE Dressing: Supervision;Setup  Additional Comments: seated EOB pt donned socks- initiall requiring balance assist secondary to drowsiness and decreased initial attention. Pt ambulated to clothing bag to retrieve items- pt then completed UB/LB sponge bathing and dressing alternating between sitting/standing at sink- pt assisted to wash hair with hand held shower head w/c level- increased time to complete tasks, intermittent VCs for balance correction in stance. Seated in recliner at EOS pt donned socks/shoes with set-up, no LOB noted. Balance  Sitting Balance: Supervision  Standing Balance: Stand by assistance (SBA >> SUP)  Standing Balance  Time: ~8min total  Activity: ADL completion, mobility, transfers    Functional Mobility  Functional - Mobility Device: Rolling Walker  Activity: To/from bathroom;Transport items; Retrieve items  Assist Level: Supervision  Functional Mobility Comments: SBA secondary to initial drowsiness progressing to SUP    Bed mobility  Supine to Sit: Independent (HOB slightly elevated)     Transfers  Sit to stand: Supervision  Stand to sit: Supervision      Cognition  Overall Cognitive Status: Exceptions  Arousal/Alertness: Appropriate responses to stimuli  Following Commands: Follows one step commands consistently; Follows one step commands with increased time  Attention Span: Attends with cues to redirect  Memory: Appears intact  Safety Judgement: Decreased awareness of need for assistance;Decreased awareness of need for safety  Problem Solving: Assistance required to generate solutions  Insights: Decreased awareness of deficits  Initiation: Requires cues for some  Sequencing: Requires cues for some           Second Session: Pt seated in recliner upon entry, pleasant and agreeable to OT. Pt denies pain, requesting to use commode. Pt ambulated to bathroom w/ rw at [facility], completed toilet transfer w/ CGA, toileting completed at [facility]. Pt completed toileting at SUP. Pt completed table top game in order to address standing balance/tolerance, fine motor skills, visual spatial skills, and problem solving. Pt stood ~8 min total during game-pt w/ several controlled posteiror LOB to chair during game. Pt demonstrated good visual spatial skills and FM control during game play. Pt seated in recliner at EOS, chair alarm on, needs within reach.         Plan   Plan: D/C home with 24/7 Sup from spouse and OP therapy services   Current Treatment Recommendations: Safety Education & Training,Self-Care / ADL,Endurance Training,Functional Mobility Training,Balance Training,Patient/Caregiver Education & Training,Equipment Evaluation, Education, & procurement          Goals  Short term goals  Time Frame for Short term goals: 2 weeks  Short term goal 1: SBA functional transfers to toilet and shower-- not met, progressing  Short term goal 2: SBA UB ADLs-- goal met 1/19  Short term goal 3: min A LB ADLs-- CGA 1/14 for socks and shoes, goal met 1/18  Short term goal 4: CGA functional mobility for ADLs-- goal met 1/18  Short term goal 5: NEW GOAL: SUP for LB ADLs- goal met  Short term goal 6: NEW GOAL: SUP for functional mobility for ADL completion- goal met  Patient Goals   Patient goals : to get stronger, stop falling       Therapy Time   Individual Concurrent Group Co-treatment   Time In 0730         Time Out 0815         Minutes 45         Timed Code Treatment Minutes: 45 Minutes  Total Treatment Minutes:  [address], OTR/L #[id number]    Second Session Therapy Time:   Individual Concurrent Group Co-treatment   Time In 1030      Time Out 1100      Minutes 30         Timed Code Treatment Minutes:  45 + 30     Total Treatment Minutes:  75 minutes     Tim Redd OTR/LISRAEL #[id number]  (Treatment and documentation for second session)

## 2022-01-20 NOTE — PROGRESS NOTES
Janet Shadow  1/20/2022  [id number]    Chief Complaint: Recurrent falls    Subjective:   Hallucinations last evening. Reports that she does this when she gets nervous. Nervous regarding discharge tomorrow. No other complaints. Participating well with therapy. Glucoses appropriate. ROS: No CP, SOB, dyspnea    Objective:  Patient Vitals for the past 24 hrs:   BP Temp Temp src Pulse Resp SpO2 Weight   01/20/22 0815 134/78 98.2 °F (36.8 °C) Oral 80 18 95 % --   01/20/22 0509 -- -- -- -- 18 94 % --   01/19/22 2000 (!) 147/75 98.2 °F (36.8 °C) Oral 85 16 94 % --   01/19/22 1809 (!) 159/77 96.9 °F (36.1 °C) -- 78 16 -- 190 lb 11.2 oz (86.5 kg)   01/19/22 1439 (!) 151/80 96.4 °F (35.8 °C) -- 78 16 -- 195 lb 1.7 oz (88.5 kg)   01/19/22 0905 (!) 147/80 97.8 °F (36.6 °C) Oral 75 16 98 % --     Gen: No distress, pleasant. Resting in bedside chair  HEENT: Normocephalic, atraumatic  CV: Regular rate and rhythm. No MRG   Resp: No respiratory distress. CTAB   Abd: Soft, nontender nondistended  Ext: No edema. Neuro: Alert, oriented, appropriately interactive. Laboratory data: Available via EMR. Therapy progress:  PT  Position Activity Restriction  Other position/activity restrictions: 51-year-old female with a history of ESRD on HD, diabetes, diabetic neuropathy, legal blindness, CVA with resultant right hemiparesis, HTN, and HLD who was admitted on 1/4 with recurrent falls. Due to increasing difficulty with ambulation and transfers, she had missed 2 dialysis sessions prior to admission. She was recently discharged on 12/29 for an admission with generalized weakness and falls consistent with this admission. She scored well enough with therapy evaluations to discharge to home however it does not appear she is able to care for herself with the assistance of her . She was evaluated by therapy and suggested to continue in an inpatient setting prior to returning home.   Patient reports that she had her initial stroke in August and discharged to home with outpatient therapies and subsequently her second stroke in late 2021 resulted in her discharging to [address]. She felt that her therapies were not as effective or intensive as her outpatient therapies. She reports no current complaints. PT Equipment Recommendations  Equipment Needed: Yes  Mobility Devices: Ada Chano: Rolling  Objective     Bridging: Independent  Scooting: Minimal assistance (Required assistance scooting (L) + (R) to position straight. Independent scooting up in bed)  Sit to Stand: Stand by assistance  Stand to sit: Stand by assistance (VC for proper allignment and safety)  Ambulation  Device: Rolling Walker  Assistance: Contact guard assistance  Distance: 50' (max distance not attempted)  Assessment   Assessment: Pt continuing to improve independence with functional mobility. Floot=> chair transfer trialed this date @ [facility] x 1. Still requires SBA for transfers and VC for attention to task. Pt would continue to benefit from skilled physical therapy yo reinforce safety education and improve functional independence to decrease number of falls. OT  Objective  Feeding: Setup  UE Bathing: Setup,Supervision  LE Bathing: Supervision,Setup  UE Dressing: Modified independent   LE Dressing: Supervision,Setup  Toileting: Dependent/Total  Toilet - Technique: Ambulating  Equipment Used: Extra wide bedside commode  Toilet Transfer: Minimal assistance     Sit to stand: Supervision  Stand to sit: Supervision  Toilet - Technique: Ambulating  Equipment Used: Extra wide bedside commode  Assessment   Assessment: Pt presented with with above deficits placing her well below her baseline level of occupational function. Pt has a chronic visual deficit and hx of 2 CVAs. Pt is progressing well, demo's increased ability to complete LB tasks and requiring less VCs to attend to activities.  pt continues to demo intermittent LOB however transfers and mobility abilities are progressing. Continue POC    SLP  Cognitive Diagnosis: Pt continues to present with mild cognitive linguistic deficits. High accuracy with naming tasks but continues to complain of effortful word production. Higher level cognitive-linguistic tasks are limited due to visual deficits. Speech Diagnosis: Pt continues to present with slow speech production and hoarse vocal quality at baseline. Will continue to assess pt's speech sound production and treat as appropriate. Body mass index is 29.87 kg/m².     Assessment:  Patient Active Problem List   Diagnosis    Type 2 diabetes mellitus, with long-term current use of insulin (HCC)    Mixed hyperlipidemia    Migraine headache    Anemia    Diabetic foot infection ([address]    Pyogenic inflammation of bone ([address]    History of medication noncompliance    Osteomyelitis of left foot (HCC)    Nephrotic syndrome    Peripheral edema    Pulmonary edema    Right sided numbness    Tobacco dependence    Chronic kidney disease (CKD), stage III (moderate) (HCC)    Chronic diastolic (congestive) heart failure (HCC)    History of CVA (cerebrovascular accident)    Arterial ischemic stroke, ICA, left, acute ([address]    HTN (hypertension), benign    DM (diabetes mellitus), secondary, uncontrolled, w/neurologic complic ([address]    Dyslipidemia    Smoker    Panic disorder    Isolated proteinuria    Acute on chronic diastolic heart failure (HCC)    Diabetic peripheral neuropathy (HCC)    Acute respiratory failure (HCC)    Depression    Abnormal gait    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, associated with type 2 diabetes mellitus ([address]    Cellulitis of right foot    Hidradenitis suppurativa    Hypocalcemia    Non-toxic multinodular goiter    Polyneuropathy due to type 2 diabetes mellitus ([address]    Proliferative diabetic retinopathy associated with type 2 diabetes mellitus ([facility]    ESRD (end stage renal disease) ([facility]    Acute respiratory failure with hypoxemia (HCC)    Acute respiratory failure due to COVID-19 ([facility]    Suspected COVID-19 virus infection    Epiglottitis    Recurrent falls       Plan:   Debility: PT/OT     Recurrent falls: PT/OT     ESRD on HD: MWF, Nephro following. AVF creation 2/10.      DM: lantus decreased to 17, SSI     DM neuropathy: lyrica 75     H/O CVA: resultant R hemiparesis per report but none significant on exam     HTN: Propranolol 80, lisinopril 10. Discontinued norvasc 10, clonidine 0.2, lisinopril 40, spironolactone 50. Nephro managing     HLD: Lipitor 40     Anx/Dep: wellbutrin , xanax PRN, resumed home luvox 100 HS    Staph epi UTI: sensitive to bactrim - to complete at discharge. Hyperkalemia: per HD     Bowels: Per protocol  Bladder: Per protocol   Sleep: Trazodone provided prn. Pain: tylenol, tramadol PRN   DVT PPx: Heparin    GILSON: 1/21    Dispo: Prep d/c for tomorrow. Lorri Barber MD 1/20/2022, 8:53 AM    * This document was created using dictation software. While all precautions were taken to ensure accuracy, errors may have occurred. Please disregard any typographical errors.

## 2022-01-21 VITALS
TEMPERATURE: 98 F | HEART RATE: 77 BPM | RESPIRATION RATE: 18 BRPM | OXYGEN SATURATION: 97 % | DIASTOLIC BLOOD PRESSURE: 66 MMHG | BODY MASS INDEX: 29.69 KG/M2 | WEIGHT: 189.15 LBS | SYSTOLIC BLOOD PRESSURE: 129 MMHG | HEIGHT: 67 IN

## 2022-01-21 LAB
ALBUMIN SERPL-MCNC: 3.5 G/DL (ref 3.4–5)
ALBUMIN SERPL-MCNC: 3.7 G/DL (ref 3.4–5)
ANION GAP SERPL CALCULATED.3IONS-SCNC: 13 MMOL/L (ref 3–16)
ANION GAP SERPL CALCULATED.3IONS-SCNC: 14 MMOL/L (ref 3–16)
BUN BLDV-MCNC: 50 MG/DL (ref 7–20)
BUN BLDV-MCNC: 50 MG/DL (ref 7–20)
CALCIUM SERPL-MCNC: 8.8 MG/DL (ref 8.3–10.6)
CALCIUM SERPL-MCNC: 8.9 MG/DL (ref 8.3–10.6)
CHLORIDE BLD-SCNC: 93 MMOL/L (ref 99–110)
CHLORIDE BLD-SCNC: 94 MMOL/L (ref 99–110)
CO2: 23 MMOL/L (ref 21–32)
CO2: 25 MMOL/L (ref 21–32)
CREAT SERPL-MCNC: 5.5 MG/DL (ref 0.6–1.1)
CREAT SERPL-MCNC: 5.7 MG/DL (ref 0.6–1.1)
GFR AFRICAN AMERICAN: 10
GFR AFRICAN AMERICAN: 10
GFR NON-AFRICAN AMERICAN: 8
GFR NON-AFRICAN AMERICAN: 8
GLUCOSE BLD-MCNC: 67 MG/DL (ref 70–99)
GLUCOSE BLD-MCNC: 77 MG/DL (ref 70–99)
GLUCOSE BLD-MCNC: 78 MG/DL (ref 70–99)
GLUCOSE BLD-MCNC: 78 MG/DL (ref 70–99)
HCT VFR BLD CALC: 26.9 % (ref 36–48)
HEMOGLOBIN: 8.7 G/DL (ref 12–16)
MCH RBC QN AUTO: 26.1 PG (ref 26–34)
MCHC RBC AUTO-ENTMCNC: 32.3 G/DL (ref 31–36)
MCV RBC AUTO: 81 FL (ref 80–100)
PDW BLD-RTO: 17.7 % (ref 12.4–15.4)
PERFORMED ON: ABNORMAL
PERFORMED ON: NORMAL
PHOSPHORUS: 6.1 MG/DL (ref 2.5–4.9)
PHOSPHORUS: 6.4 MG/DL (ref 2.5–4.9)
PLATELET # BLD: 254 K/UL (ref 135–450)
PMV BLD AUTO: 8.2 FL (ref 5–10.5)
POTASSIUM SERPL-SCNC: 5.1 MMOL/L (ref 3.5–5.1)
POTASSIUM SERPL-SCNC: 5.1 MMOL/L (ref 3.5–5.1)
RBC # BLD: 3.32 M/UL (ref 4–5.2)
SODIUM BLD-SCNC: 130 MMOL/L (ref 136–145)
SODIUM BLD-SCNC: 132 MMOL/L (ref 136–145)
WBC # BLD: 10.2 K/UL (ref 4–11)

## 2022-01-21 PROCEDURE — 90935 HEMODIALYSIS ONE EVALUATION: CPT

## 2022-01-21 PROCEDURE — 80069 RENAL FUNCTION PANEL: CPT

## 2022-01-21 PROCEDURE — 94761 N-INVAS EAR/PLS OXIMETRY MLT: CPT

## 2022-01-21 PROCEDURE — 6360000002 HC RX W HCPCS: Performed by: PHYSICAL MEDICINE & REHABILITATION

## 2022-01-21 PROCEDURE — 6360000002 HC RX W HCPCS: Performed by: INTERNAL MEDICINE

## 2022-01-21 PROCEDURE — 94640 AIRWAY INHALATION TREATMENT: CPT

## 2022-01-21 PROCEDURE — 85027 COMPLETE CBC AUTOMATED: CPT

## 2022-01-21 PROCEDURE — 6370000000 HC RX 637 (ALT 250 FOR IP): Performed by: PHYSICAL MEDICINE & REHABILITATION

## 2022-01-21 PROCEDURE — 36415 COLL VENOUS BLD VENIPUNCTURE: CPT

## 2022-01-21 PROCEDURE — 6370000000 HC RX 637 (ALT 250 FOR IP): Performed by: INTERNAL MEDICINE

## 2022-01-21 RX ORDER — LISINOPRIL 10 MG/1
10 TABLET ORAL DAILY
Qty: 30 TABLET | Refills: 3 | Status: SHIPPED | OUTPATIENT
Start: 2022-01-21 | End: 2022-05-26

## 2022-01-21 RX ADMIN — INSULIN GLARGINE 17 UNITS: 100 INJECTION, SOLUTION SUBCUTANEOUS at 07:55

## 2022-01-21 RX ADMIN — EPOETIN ALFA-EPBX 7000 UNITS: 10000 INJECTION, SOLUTION INTRAVENOUS; SUBCUTANEOUS at 09:23

## 2022-01-21 RX ADMIN — LISINOPRIL 10 MG: 10 TABLET ORAL at 12:17

## 2022-01-21 RX ADMIN — SULFAMETHOXAZOLE AND TRIMETHOPRIM 1 TABLET: 800; 160 TABLET ORAL at 12:16

## 2022-01-21 RX ADMIN — TIOTROPIUM BROMIDE AND OLODATEROL 2 PUFF: 3.124; 2.736 SPRAY, METERED RESPIRATORY (INHALATION) at 05:49

## 2022-01-21 RX ADMIN — HEPARIN SODIUM 5000 UNITS: 5000 INJECTION INTRAVENOUS; SUBCUTANEOUS at 05:49

## 2022-01-21 RX ADMIN — BENZONATATE 200 MG: 100 CAPSULE ORAL at 05:49

## 2022-01-21 RX ADMIN — ASPIRIN 81 MG: 81 TABLET, COATED ORAL at 12:17

## 2022-01-21 RX ADMIN — BUPROPION HYDROCHLORIDE 150 MG: 150 TABLET, EXTENDED RELEASE ORAL at 12:17

## 2022-01-21 RX ADMIN — ALPRAZOLAM 0.75 MG: 0.5 TABLET ORAL at 05:49

## 2022-01-21 RX ADMIN — ALPRAZOLAM 0.75 MG: 0.5 TABLET ORAL at 13:29

## 2022-01-21 ASSESSMENT — PAIN SCALES - GENERAL: PAINLEVEL_OUTOF10: 0

## 2022-01-21 NOTE — PROGRESS NOTES
Treatment time: 3 hours  Net UF: 2300 ml     Pre weight: 88.1 kg  Post weight:85.8 kg  EDW: 84.5 kg    Access used: R TDC    Access function: Well with  ml/min     Medications or blood products given: Retacrit     Regular outpatient schedule: [facility] [address]     Summary of response to treatment: Patient tolerated treatment well and without any complications. Patient remained stable throughout entire treatment and upon exiting the dialysis suite via transport. Report given to Cher Galindo RN and copy of dialysis treatment record placed in chart, to be scanned into EMR.

## 2022-01-21 NOTE — PLAN OF CARE
Problem: Falls - Risk of:  Goal: Will remain free from falls  Description: Will remain free from falls  Outcome: Ongoing     Problem: Pain:  Goal: Pain level will decrease  Description: Pain level will decrease  Outcome: Ongoing     Problem: Skin Integrity:  Goal: Will show no infection signs and symptoms  Description: Will show no infection signs and symptoms  Outcome: Ongoing   Education Provided as follows:  Family/Patient made aware of the tailored interventions falls plan using the TIPS TOOL.

## 2022-01-21 NOTE — PROGRESS NOTES
Pt's  at bedside. Reviewed and given prescriptions, AVS and med rec. Both of them verbalized understanding. Discharged to home.

## 2022-01-21 NOTE — DISCHARGE SUMMARY
Physical Medicine & Rehabilitation  Discharge Summary     Patient Identification:  Mary Stephens  : 1975  Admit date: 2022  Discharge date: 2022  Attending provider: Gaurav Hastings MD        Primary care provider: Kaz Maxwell     Discharge Diagnoses:   Patient Active Problem List   Diagnosis    Type 2 diabetes mellitus, with long-term current use of insulin ([address]    Mixed hyperlipidemia    Migraine headache    Anemia    Diabetic foot infection ([address]    Pyogenic inflammation of bone ([address]    History of medication noncompliance    Osteomyelitis of left foot ([address]    Nephrotic syndrome    Peripheral edema    Pulmonary edema    Right sided numbness    Tobacco dependence    Chronic kidney disease (CKD), stage III (moderate) (HCC)    Chronic diastolic (congestive) heart failure (HCC)    History of CVA (cerebrovascular accident)    Arterial ischemic stroke, ICA, left, acute ([address]    HTN (hypertension), benign    DM (diabetes mellitus), secondary, uncontrolled, w/neurologic complic ([address]    Dyslipidemia    Smoker    Panic disorder    Isolated proteinuria    Acute on chronic diastolic heart failure (HCC)    Diabetic peripheral neuropathy (HCC)    Acute respiratory failure (HCC)    Depression    Abnormal gait    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, associated with type 2 diabetes mellitus ([address]    Cellulitis of right foot    Hidradenitis suppurativa    Hypocalcemia    Non-toxic multinodular goiter    Polyneuropathy due to type 2 diabetes mellitus ([address]    Proliferative diabetic retinopathy associated with type 2 diabetes mellitus ([address]    ESRD (end stage renal disease) ([address]    Acute respiratory failure with hypoxemia ([address]    Acute respiratory failure due to COVID-19 ([address]    Suspected COVID-19 virus infection    Epiglottitis    Recurrent falls       Discharge Functional Status: Physical therapy:  Bed Mobility: Scooting: Minimal assistance (Required assistance scooting (L) + (R) to position straight. Independent scooting up in bed)  Transfers: Sit to Stand: Stand by assistance (multiple completions within session including from recliner, low bed, and w/c surface)  Stand to sit: Stand by assistance  Comment: 1st session:  Functional mobility above, Ambulation 1  Surface: level tile  Device: Rolling Walker  Assistance: Stand by assistance  Quality of Gait: reciprocal pattern with reduced noble. narrow ROMAINE with decreased stride length. ongoing mild FWD flexed posture onto RW. Gait Deviations: Slow Noble  Distance: 200' + short distances within session  Comments: VC for object negotiation due to unfamiliar enviornment and visual deficits, Stairs  # Steps : 12  Stairs Height: 6\"  Rails: Bilateral  Curbs: 4\"  Device: Rolling walker  Assistance: Contact guard assistance  Comment: Reciprocal pattern ascending and descending with increased time in double limb support. VC for improved ROMAINE width. Mobility:  , PT Equipment Recommendations  Equipment Needed: No  Mobility Devices: [employer] Adelaida: Rolling  Other: Pt owns RW, Assessment: Pt made excellent progress in response to therapy services, meeting 4/5 set goals and making significant gains in remaining stair mobility goal.  Pt has progressed to independent level for bed mobility, and SBA for all level surface household mobility tasks with ongoing use of RW. Pt continues to require safety cueing to limit distractions within tasks and for low vision items including object avoidance. Therapy recommendation for supervision with all standing based mobility tasks upon discharge to be provided by spouse with transition to outpatient therapy services for further strengthening and balance training to further decrease risk of falling.   Caregiver training has been completed with spouse in prior sessions to maximize patient performance and improve safety of both patient and caregiver. Occupational therapy:  ,  , Assessment: Pt presented with with above deficits placing her well below her baseline level of occupational function. Pt has a chronic visual deficit and hx of 2 CVAs. Pt has progressed well throughout stay- now able to complete ADL tasks at SUP/SBA levels with overall less VCs for attention/initiation. Pt itnermittently required VCs for balance corrections at times. SBA recommended for light IADL tasks such as meal prep for increased safety. Pt has been introduced and educated on AE for item transportation and retrieval of small items from floor height. Pt would benefit from ongoing therapy post d/c to continue to maximize her independence and safety with all occupationas as well as reduce caregiver burden. Speech therapy:       Inpatient Rehabilitation Course:   [place] is a 55 y.o. female admitted to inpatient rehabilitation on 1/6/2022 s/p Recurrent falls. The patient participated in an aggressive multidisciplinary inpatient rehabilitation program involving 3 hours of therapy per day, at least 5 days per week. She progressed well in therapy and was able to be discharged with home health care. Her medical rehab course was significant for below:    Debility: PT/OT     Recurrent falls: PT/OT     ESRD on HD: MWF, Nephro following. AVF creation scheduled for 2/10.      DM: lantus decreased to 17, SSI. Continue this adjusted regimen at discharge. Adjust per PCP follow up recs.      DM neuropathy: lyrica 75 per home regimen.      H/O CVA: resultant R hemiparesis per report but none significant on exam     HTN: Propranolol 80, lisinopril 10. Discontinued norvasc 10, clonidine 0.2, lisinopril 40, spironolactone 50. Nephro managed. Continue the adjusted regimen above.      HLD: Lipitor 40 per home regimen.      Anx/Dep: wellbutrin , xanax PRN, resumed home luvox 100 HS per home regimen.      Staph epi UTI: sensitive to bactrim. Completed at discharge.     Hyperkalemia: managed per HD    Discharge Exam:  Patient Vitals for the past 24 hrs:   BP Temp Temp src Pulse Resp SpO2 Weight   01/21/22 0551 -- -- -- -- -- 92 % 196 lb 13.9 oz (89.3 kg)   01/21/22 0549 -- -- -- -- -- -- 196 lb 13.9 oz (89.3 kg)   01/20/22 2115 (!) 153/69 98.5 °F (36.9 °C) Oral 82 16 91 % --   01/20/22 1145 120/71 98.4 °F (36.9 °C) Oral 79 16 97 % --     Gen: No distress, pleasant. Resting in bedside chair  HEENT: Normocephalic, atraumatic  CV: Regular rate and rhythm. No MRG   Resp: No respiratory distress. CTAB   Abd: Soft, nontender nondistended  Ext: No edema. Neuro: Alert, oriented, appropriately interactive. Significant Diagnostics:   Lab Results   Component Value Date    CREATININE 5.8 (HH) 01/19/2022    BUN 56 (H) 01/19/2022     (L) 01/19/2022    K 4.8 01/19/2022    CL 93 (L) 01/19/2022    CO2 24 01/19/2022       Lab Results   Component Value Date    WBC 10.2 01/21/2022    HGB 8.7 (L) 01/21/2022    HCT 26.9 (L) 01/21/2022    MCV 81.0 01/21/2022     01/21/2022       Disposition:  Home in stable condition with outpatient therapy. Follow-up:  See after visit summary from hospitalization    Discharge Medications:     Medication List      CHANGE how you take these medications    albuterol sulfate  (90 Base) MCG/ACT inhaler  Inhale 2 puffs into the lungs every 6 hours as needed for Wheezing or Shortness of Breath  What changed: Another medication with the same name was removed. Continue taking this medication, and follow the directions you see here. lisinopril 10 MG tablet  Commonly known as: PRINIVIL;ZESTRIL  Take 1 tablet by mouth daily  What changed:   · medication strength  · how much to take        CONTINUE taking these medications    ALPRAZolam 3 MG extended release tablet  Commonly known as: XANAX XR  Take 1 tablet by mouth every morning for 3 days.   Notes to patient: Uses: anxiety disorders, panic disorders  Side Effects: drowsiness, fatigue, memory impairment     aspirin 81 MG EC tablet  Take 1 tablet by mouth daily  Notes to patient: Uses; mild to moderate pain reliever, cardiac issues.   Side effects; prone to bleeding, nausea and vomiting         atorvastatin 40 MG tablet  Commonly known as: LIPITOR  Take 1 tablet by mouth nightly  Notes to patient: Uses: treatment of high cholesterol  Side Effects: muscle pain, nausea, vomiting, weakness,dizziness     benzonatate 200 MG capsule  Commonly known as: TESSALON  Take 1 capsule by mouth every 8 hours as needed for Cough  Notes to patient: Uses:relief of cough  Side Effects:headache, dizziness constipatin     Breathe Right Extra Strength Strp  1 strip by Does not apply route nightly as needed (nasal congestion)     buPROPion 150 MG extended release tablet  Commonly known as: WELLBUTRIN XL  Take 1 tablet by mouth every morning  Notes to patient: Uses : anti depressant medication   Side effects : blurred vision , nausea, ringing in the ears      Dulaglutide 1.5 MG/0.5ML Sopn  Inject 1.5 mg into the skin once a week     fluvoxaMINE 100 MG tablet  Commonly known as: LUVOX  Take 1 tablet by mouth nightly  Notes to patient: Uses : to treat obsessive compulsive disorder   Side effect: nausea, vomiting ,loss of appetite      glucose 40 % Gel  Commonly known as: GLUTOSE  Take 37.5 mLs by mouth as needed (low blood sugar)     glycopyrrolate-formoterol 9-4.8 MCG/ACT Aero  Commonly known as: Bevespi Aerosphere  Inhale 2 puffs into the lungs 2 times daily     hydrOXYzine 25 MG tablet  Commonly known as: ATARAX  Notes to patient: Uses: slows the central nervous system, acts as an antihistamine  Side Effects: fast heart rate, headache, hives     insulin lispro (1 Unit Dial) 100 UNIT/ML Sopn  Inject 0-6 Units into the skin 3 times daily (with meals) **Corrective Low Dose Algorithm**  Glucose: Dose:               No Insulin  140-199 1 Unit  200-249 2 Units  250-299 3 Units  300-349 4 Units  350-399 5 Units  Over 399 6 Units  Notes to patient: Uses: short acting insulin  Side effects: low blood sugar, headache, nausea     Insulin Pen Needle 29G X 12.7MM Misc  1 each by Does not apply route daily     Lantus SoloStar 100 UNIT/ML injection pen  Generic drug: insulin glargine  Notes to patient: Uses: long acting insulin  Side effects:low Blood sugar, itching,and mild skin rash     methyl salicylate-menthol 40-59 % Crea  Apply topically 3 times daily as needed for Pain     pregabalin 75 MG capsule  Commonly known as: LYRICA  TAKE ONE CAPSULE BY MOUTH EVERY NIGHT  Notes to patient: Uses ; to treat seizures and suicidal thoughts   Side effects : swelling , nausea, lots of sleep.     propranolol 80 MG extended release capsule  Commonly known as: INDERAL LA  TAKE ONE CAPSULE BY MOUTH EVERY MORNING  Notes to patient: Uses: reduce your heart's workload and helps it beat more regularly   Side Effects: slower heart rate, diarrhea,dry eyes,hair loss,nausea,weakness, and tiredness. Pulse Oximeter Misc  1 each by Does not apply route every 6-8 hours as needed (shortness of breath)  Notes to patient: Uses : to measure oxygen level in the body      ThermaCare Back/Hip Misc  1 each by Does not apply route daily as needed (back pain)        STOP taking these medications    amLODIPine 10 MG tablet  Commonly known as: NORVASC     cloNIDine 0.2 MG/24HR Ptwk  Commonly known as: CATAPRES     furosemide 40 MG tablet  Commonly known as: LASIX     spironolactone 50 MG tablet  Commonly known as: ALDACTONE           Where to Get Your Medications      These medications were sent to [facility] [address], [address], [address]    Phone: [phone]   · lisinopril 10 MG tablet         I spent over 35 minutes on this discharge encounter between counseling, coordination of care, and medication reconciliation.       To comply with [employer] R. II.4.1:  Discharge order placed in advance to facilitate patient's discharge needs. Joe Velazquez MD    * This document was created using dictation software. While all precautions were taken to ensure accuracy, errors may have occurred. Please disregard any typographical errors.

## 2022-01-21 NOTE — PROGRESS NOTES
MD Ladan Plunkett MD Cathaleen Lacks, MD                Office: [phone]                      Fax: [phone]          Inpatient  Progress Note:     PATIENT NAME: Robert Arguelles  : 1975  MRN: [id number]         IMPRESSION / RECOMMENDATION:       Admitted for:  Recurrent falls [R29.6]    ESRD-tolerating dialysis well. HD MWF. HD today, EDW decreased to 84.5kg. Hyponatremia-follow  Metabolic acidosis-follow with HD. Better. Anemia due to CKD-RIA with HD    Vital signs stable. BP lower limits. Stopped Amlodipine and Clonidine. Follow bp, at goal today. Continue renal diet    Medications reviewed. Monitor anemia levels. Okay for discharge today. Outpt HD unit will be informed of new EDW. 1. ESRD on iHD: MWF ,  - follows w/ Dr. Shannon Chapa- [facility] dialysis center   - access: [facility]       - outpt HD center: [facility], Dr Shannon Chapa   - recently started HD in 2021, during admission w/ ATN w/ KINZA on CKD-4, was lost for f/u,), had acute hypoxic respiratory failure, pneumonia - needing intubation in past   - missed HD x2  - encouraged compliance as if her solutes are better control, she may feel overall better    - so needed HD on admission during inpatient stay  - now admitted to ARU for inpatient rehab    Labs MWF - f/u K   Added Low K diet  Decreased Lisinopril 40 -> 20 mg QD      EDW listed 88 (but per pt 85 kg)     2. Hypertension/Volume Status:+edema. Now appears euvolemic.   - BP HTN - hx of resistant HTN - slightly uncontrolled - f/u after HD + resume home meds   - EDW reported 88  -> 83 kg currently - so might be her new DW  - Na - chronic hypoNatremia - f/u w/ HD    - bp low, meds adjusted. Now only on Lisinopril 10mg daily.       3. Electrolytes/acid-base:  K: WNL  High AG metabolic acidosis: mild - f/u w/ HD    Hyponatremia- fluid restriction    4. Anemia of renal failure: at goal  - RIA: w/ HD     5.  BMD:  - Phos: follow iPTH outpt         Other Problems:  Recurrent falls [R29.6]       Dillon Brown MD  [facility] [address] [place]: [phone] or [address] [employer]  Fax: [phone]     Subjective  No complaints today  Seen on HD      Vitals:    01/21/22 1200   BP: 129/66   Pulse: 77   Resp: 18   Temp: 98 °F (36.7 °C)   SpO2: 97%     PE  Gen-appears well  HEENT-anicteric sclerae  Chest-clear  Heart-regular  Abd-soft  Ext-trace edema         Labs Reviewed  by me   Labs   Lab Results   Component Value Date    CREATININE 5.7 (HH) 01/21/2022    CREATININE 5.5 (HH) 01/21/2022    BUN 50 (H) 01/21/2022    BUN 50 (H) 01/21/2022     (L) 01/21/2022     (L) 01/21/2022    K 5.1 01/21/2022    K 5.1 01/21/2022    CL 94 (L) 01/21/2022    CL 93 (L) 01/21/2022    CO2 25 01/21/2022    CO2 23 01/21/2022     Lab Results   Component Value Date    WBC 10.2 01/21/2022    HGB 8.7 (L) 01/21/2022    HCT 26.9 (L) 01/21/2022    MCV 81.0 01/21/2022     01/21/2022       Dialysis Treatment and Prescription reviewed

## 2022-01-24 ENCOUNTER — CARE COORDINATION (OUTPATIENT)
Dept: CASE MANAGEMENT | Age: 47
End: 2022-01-24

## 2022-01-24 NOTE — CARE COORDINATION
[facility] [facility] Transitions Initial Follow Up Call    Call within 2 business days of discharge: Yes    Patient: Hanh Dugan Patient : 1975   MRN: [id number]  Reason for Admission: Falls, COVID+  Discharge Date: 22 RARS: Readmission Risk Score: 34.4 ( )    First attempt at 24 hour discharge call, patient is at dialysis, requests a call tomorrow. CTN will continue with outreach follow up calls.           Munir Martin RN

## 2022-01-25 ENCOUNTER — CARE COORDINATION (OUTPATIENT)
Dept: CASE MANAGEMENT | Age: 47
End: 2022-01-25

## 2022-01-25 NOTE — CARE COORDINATION
on symptoms and resources available to patient including: PCP. Follow up appointment scheduled within 7 days of discharge: no. If no appointment scheduled, scheduling offered: yes,  stated \"no\" and patient declined assistance with scheduling of hospital follow up appointment. No future appointments. Interventions: Obtained and reviewed discharge summary and/or continuity of care documents  Reviewed discharge instructions, medical action plan and red flags with patient who verbalized understanding. No further follow-up call indicated.       Argentina Joseph RN

## 2022-01-26 NOTE — PROGRESS NOTES
Name_______________________________________Printed:____________________  Date and time of surgery_2/10 0900_______________________Arrival Time:__0730______________   1. The instructions given regarding when and if a patient needs to stop oral intake prior to surgery varies. Follow the specific instructions you were given                  _x__Nothing to eat or to drink after Midnight the night before.                   ____Carbo loading or ERAS instructions will be given to select patients-if you have been given those instructions -please do the following                           The evening before your surgery after dinner before midnight drink 40 ounces of gatorade. If you are diabetic use sugar free. The morning of surgery drink 40 ounces of water. This needs to be finished 3 hours prior to your surgery start time. 2. Take the following pills with a small sip of water on the morning of surgery___ xanax inhalers wellbutrin inderal________________________________________________                  Do not take blood pressure medications ending in pril or sartan the carlos prior to surgery or the morning of surgery_   3. Aspirin, Ibuprofen, Advil, Naproxen, Vitamin E and other Anti-inflammatory products and supplements should be stopped for 5 -7days before surgery or as directed by your physician. 4. Check with your Doctor regarding stopping Plavix, Coumadin,Eliquis, Lovenox,Effient,Pradaxa,Xarelto, Fragmin or other blood thinners and follow their instructions. 5. Do not smoke, and do not drink any alcoholic beverages 24 hours prior to surgery. This includes NA Beer. Refrain from the usage of any recreational drugs. 6. You may brush your teeth and gargle the morning of surgery. DO NOT SWALLOW WATER   7. You MUST make arrangements for a responsible adult to stay on site while you are here and take you home after your surgery. You will not be allowed to leave alone or drive yourself home.   It is strongly suggested someone stay with you the first 24 hrs. Your surgery will be cancelled if you do not have a ride home. 8. A parent/legal guardian must accompany a child scheduled for surgery and plan to stay at the hospital until the child is discharged. Please do not bring other children with you. 9. Please wear simple, loose fitting clothing to the hospital.  Marya Carlos not bring valuables (money, credit cards, checkbooks, etc.) Do not wear any makeup (including no eye makeup) or nail polish on your fingers or toes. 10. DO NOT wear any jewelry or piercings on day of surgery. All body piercing jewelry must be removed. 11. If you have ___dentures, they will be removed before going to the OR; we will provide you a container. If you wear ___contact lenses or ___glasses, they will be removed; please bring a case for them. 12. Please see your family doctor/pediatrician for a history & physical and/or concerning medications. Bring any test results/reports from your physician's office. PCP__________________Phone___________H&P Appt. Date________             13 If you  have a Living Will and Durable Power of  for Healthcare, please bring in a copy. 15. Notify your Surgeon if you develop any illness between now and surgery  time, cough, cold, fever, sore throat, nausea, vomiting, etc.  Please notify your surgeon if you experience dizziness, shortness of breath or blurred vision between now & the time of your surgery             15. DO NOT shave your operative site 96 hours prior to surgery. For face & neck surgery, men may use an electric razor 48 hours prior to surgery. 16. Shower the night before or morning of surgery using an antibacterial soap or as you have been instructed. 17. To provide excellent care visitors will be limited to one in the room at any given time. 18.  Please bring picture ID and insurance card.              19.  Visit our web site for additional information:  [email or web address]              20.During flu season no children under the age of 15 are permitted in the hospital for the safety of all patients. 21. If you take a long acting insulin in the evening only  take half of your usual  dose the night  before your procedure              22. If you use a c-pap please bring DOS if staying overnight,             23.For your convenience [address] [facility] has a pharmacy on site to fill your prescriptions. 24. If you use oxygen and have a portable tank please bring it  with you the DOS             25. Bring a complete list of all your medications with name and dose include any supplements. 26. Other___pats 2/4_______________________________________   *Please call pre admission testing if you any further questions   [address]    [facility] [place]  [phone]   [facility] [address] YOANDY MCKNIGHT   [phone]           COVID TESTING    _x__ Covid test to be done 3-5 days prior to scheduled surgery -patient aware they are REQUIRED to bring a copy of the negative result DOS-if they receive a positive result to notify their surgeon     [facility] 2/4    If known - indicate where patient is getting covid test done ___________________________________________________________    ___ Rapid - DOS    ___ Other___________________________________      Nicolette Ureña POLICY(subject to change)    There is a one visitor policy at [facility] for all surgeries and endoscopies. Whether the visitor can stay or will be asked to wait in the car will depend on the current policy and if social distancing can be maintained. The policy is subject to change at any time. Please make sure the visitor has a cell phone that is on,charged and able to accept calls, as this may be the way that the staff communicates with them. Pain management is NO VISITOR policyThe patients ride is expected to remain in the car with a cell phone for communication. If the ride is leaving the hospital grounds please make sure they are back in time for pickup. Have the patient inform the staff on arrival what their rides plans are while the patient is in the facility. At the MAIN there is one visitor allowed. Please note that the visitor policy is subject to change. All above information reviewed with patient in person or by phone. Patient verbalizes understanding. All questions and concerns addressed.                                                                                                  Patient/Rep____per phone/pt________________                                                                                                                                    PRE OP INSTRUCTIONS

## 2022-01-28 ENCOUNTER — TELEPHONE (OUTPATIENT)
Dept: FAMILY MEDICINE CLINIC | Age: 47
End: 2022-01-28

## 2022-01-28 DIAGNOSIS — J44.9 CHRONIC OBSTRUCTIVE PULMONARY DISEASE, UNSPECIFIED COPD TYPE (HCC): Primary | ICD-10-CM

## 2022-01-28 NOTE — TELEPHONE ENCOUNTER
----- Message from Gerri Xiong sent at 1/28/2022  2:29 PM EST -----  Subject: Message to Provider    QUESTIONS  Information for Provider? Pulse Oximeter prescription was never sent to   the medical supply store, [facility] in [place] is   where the prescription needs to go.   ---------------------------------------------------------------------------  --------------  CALL BACK INFO  What is the best way for the office to contact you? OK to leave message on   voicemail  Preferred Call Back Phone Number? [phone]  ---------------------------------------------------------------------------  --------------  SCRIPT ANSWERS  Relationship to Patient? Third Party  Representative Name?  Hellen

## 2022-01-28 NOTE — TELEPHONE ENCOUNTER
A pulse oximeter does not need a rx. I suggested that they can get one, which can be found readily OTC or online. Please fax the rx to that place. Damon Ang [address]

## 2022-02-01 ENCOUNTER — CARE COORDINATION (OUTPATIENT)
Dept: CASE MANAGEMENT | Age: 47
End: 2022-02-01

## 2022-02-01 NOTE — CARE COORDINATION
Care Transitions Outreach Attempt    Call within 2 business days of discharge: Yes   Attempted to reach patient for transitions of care follow up. Unable to reach patient. Unable to lvm, just rang. Jannette Woods LP, [facility] 30: [phone]      Patient: Celina Elena Patient : 1975 MRN: [id number]    Last Discharge [facility]       Complaint Diagnosis Description Type Department Provider    22   Admission (Discharged) Lindsay Peabody Staggs, MD            Was this an external facility discharge? No Discharge Facility: [facility]    Noted following upcoming appointments from discharge chart review:   [facility] follow up appointment(s): No future appointments.   Non-[facility] follow up appointment(s):

## 2022-02-03 ENCOUNTER — CARE COORDINATION (OUTPATIENT)
Dept: CASE MANAGEMENT | Age: 47
End: 2022-02-03

## 2022-02-03 NOTE — CARE COORDINATION
Claus 45 Transitions Follow Up Call    2/3/2022    Patient: Kale Laguerre  Patient : 1975   MRN: [id number]  Reason for Admission: Covid  Discharge Date: 22 RARS: Readmission Risk Score: 34.4 ( )         Spoke with: Vik Hannon 481 Transitions Follow Up Call    Needs to be reviewed by the provider   Additional needs identified to be addressed with provider: No  none             Method of communication with provider : none      Care Transition Nurse (CTN) contacted the patient by telephone to follow up after admission on 22. Verified name and  with patient as identifiers. Addressed changes since last contact: none  Discussed follow-up appointments. If no appointment was previously scheduled, appointment scheduling offered: Yes. Is follow up appointment scheduled within 7 days of discharge? Yes. Advance Care Planning:   Does patient have an Advance Directive: Not on file  CTN reviewed discharge instructions, medical action plan and red flags with patient and discussed any barriers to care and/or understanding of plan of care after discharge. Discussed appropriate site of care based on symptoms and resources available to patient including: PCP, Specialist, Urgent care clinics and When to call 911. The patient agrees to contact the PCP office for questions related to their healthcare. Patients top risk factors for readmission: ineffective coping  Interventions to address risk factors: Obtained and reviewed discharge summary and/or continuity of care documents      Non-[facility] follow up appointment(s):     CTN provided contact information for future needs. Plan for follow-up call in 5-7 days based on severity of symptoms and risk factors. Plan for next call: follow up appointment-Scheduled? This [facility] spoke with pt and pt stated that she is doing well. Patient denied any worsening symptoms. Denied fever, chills, N/V and any difficulty breathing at this time.   Denied difficulty with urination, BMs or appetite. Denied chest pain and SOB. No new changes or medications to report or address. Denied any needs and concerns at this time. Advised pt to immediately report any worsening symptoms to the PCP. Patient verbalized understanding and agreed. Nico Tan LPN, [facility]  PH: [phone]              Care Transitions Subsequent and Final Call    Schedule Follow Up Appointment with PCP: Completed  Subsequent and Final Calls  Do you have any ongoing symptoms?: No  Have your medications changed?: No  Do you have any questions related to your medications?: No  Do you currently have any active services?: No  Do you have any needs or concerns that I can assist you with?: No  Identified Barriers: None  Care Transitions Interventions  No Identified Needs  Other Interventions: Follow Up  No future appointments.     Nico Tan LPN

## 2022-02-07 ENCOUNTER — HOSPITAL ENCOUNTER (OUTPATIENT)
Age: 47
Discharge: HOME | End: 2022-02-07
Payer: COMMERCIAL

## 2022-02-07 DIAGNOSIS — Z99.2 ESRD (END STAGE RENAL DISEASE) ON DIALYSIS (HCC): ICD-10-CM

## 2022-02-07 DIAGNOSIS — N18.6 ESRD (END STAGE RENAL DISEASE) ON DIALYSIS (HCC): ICD-10-CM

## 2022-02-07 LAB
ABO/RH: NORMAL
ANION GAP SERPL CALCULATED.3IONS-SCNC: 20 MMOL/L (ref 3–16)
ANTIBODY SCREEN: NORMAL
APTT: 36.5 SEC (ref 26.2–38.6)
BUN BLDV-MCNC: 64 MG/DL (ref 7–20)
CALCIUM SERPL-MCNC: 8.2 MG/DL (ref 8.3–10.6)
CHLORIDE BLD-SCNC: 103 MMOL/L (ref 99–110)
CO2: 22 MMOL/L (ref 21–32)
CREAT SERPL-MCNC: 8.5 MG/DL (ref 0.6–1.1)
GFR AFRICAN AMERICAN: 6
GFR NON-AFRICAN AMERICAN: 5
GLUCOSE BLD-MCNC: 104 MG/DL (ref 70–99)
HCT VFR BLD CALC: 34.5 % (ref 36–48)
HEMOGLOBIN: 11 G/DL (ref 12–16)
INR BLD: 0.97 (ref 0.88–1.12)
MCH RBC QN AUTO: 26.1 PG (ref 26–34)
MCHC RBC AUTO-ENTMCNC: 31.8 G/DL (ref 31–36)
MCV RBC AUTO: 82 FL (ref 80–100)
PDW BLD-RTO: 18 % (ref 12.4–15.4)
PLATELET # BLD: 317 K/UL (ref 135–450)
PMV BLD AUTO: 7.9 FL (ref 5–10.5)
POTASSIUM SERPL-SCNC: 5.3 MMOL/L (ref 3.5–5.1)
PROTHROMBIN TIME: 10.9 SEC (ref 9.9–12.7)
RBC # BLD: 4.21 M/UL (ref 4–5.2)
SODIUM BLD-SCNC: 145 MMOL/L (ref 136–145)
WBC # BLD: 9 K/UL (ref 4–11)

## 2022-02-07 PROCEDURE — U0005 INFEC AGEN DETEC AMPLI PROBE: HCPCS

## 2022-02-07 PROCEDURE — 85610 PROTHROMBIN TIME: CPT

## 2022-02-07 PROCEDURE — U0003 INFECTIOUS AGENT DETECTION BY NUCLEIC ACID (DNA OR RNA); SEVERE ACUTE RESPIRATORY SYNDROME CORONAVIRUS 2 (SARS-COV-2) (CORONAVIRUS DISEASE [COVID-19]), AMPLIFIED PROBE TECHNIQUE, MAKING USE OF HIGH THROUGHPUT TECHNOLOGIES AS DESCRIBED BY CMS-2020-01-R: HCPCS

## 2022-02-07 PROCEDURE — 86901 BLOOD TYPING SEROLOGIC RH(D): CPT

## 2022-02-07 PROCEDURE — 85027 COMPLETE CBC AUTOMATED: CPT

## 2022-02-07 PROCEDURE — 85730 THROMBOPLASTIN TIME PARTIAL: CPT

## 2022-02-07 PROCEDURE — 86900 BLOOD TYPING SEROLOGIC ABO: CPT

## 2022-02-07 PROCEDURE — 80048 BASIC METABOLIC PNL TOTAL CA: CPT

## 2022-02-07 PROCEDURE — 86850 RBC ANTIBODY SCREEN: CPT

## 2022-02-07 PROCEDURE — 36415 COLL VENOUS BLD VENIPUNCTURE: CPT

## 2022-02-08 LAB — SARS-COV-2: NOT DETECTED

## 2022-02-10 ENCOUNTER — HOSPITAL ENCOUNTER (OUTPATIENT)
Age: 47
Setting detail: OUTPATIENT SURGERY
Discharge: HOME | End: 2022-02-10
Attending: SURGERY | Admitting: SURGERY
Payer: COMMERCIAL

## 2022-02-10 ENCOUNTER — ANESTHESIA (OUTPATIENT)
Dept: OPERATING ROOM | Age: 47
End: 2022-02-10
Payer: COMMERCIAL

## 2022-02-10 ENCOUNTER — ANESTHESIA EVENT (OUTPATIENT)
Dept: OPERATING ROOM | Age: 47
End: 2022-02-10
Payer: COMMERCIAL

## 2022-02-10 VITALS
WEIGHT: 178 LBS | OXYGEN SATURATION: 95 % | TEMPERATURE: 97.3 F | RESPIRATION RATE: 12 BRPM | HEIGHT: 67 IN | DIASTOLIC BLOOD PRESSURE: 70 MMHG | HEART RATE: 91 BPM | BODY MASS INDEX: 27.94 KG/M2 | SYSTOLIC BLOOD PRESSURE: 139 MMHG

## 2022-02-10 VITALS
RESPIRATION RATE: 12 BRPM | OXYGEN SATURATION: 100 % | SYSTOLIC BLOOD PRESSURE: 107 MMHG | DIASTOLIC BLOOD PRESSURE: 66 MMHG

## 2022-02-10 DIAGNOSIS — N18.6 ESRD (END STAGE RENAL DISEASE) ON DIALYSIS (HCC): Primary | ICD-10-CM

## 2022-02-10 DIAGNOSIS — Z99.2 ESRD (END STAGE RENAL DISEASE) ON DIALYSIS (HCC): Primary | ICD-10-CM

## 2022-02-10 LAB
ABO/RH: NORMAL
ANION GAP SERPL CALCULATED.3IONS-SCNC: 10 MMOL/L (ref 3–16)
ANTIBODY SCREEN: NORMAL
BUN BLDV-MCNC: 26 MG/DL (ref 7–20)
CALCIUM SERPL-MCNC: 9 MG/DL (ref 8.3–10.6)
CHLORIDE BLD-SCNC: 102 MMOL/L (ref 99–110)
CO2: 28 MMOL/L (ref 21–32)
CREAT SERPL-MCNC: 4.7 MG/DL (ref 0.6–1.1)
GFR AFRICAN AMERICAN: 12
GFR NON-AFRICAN AMERICAN: 10
GLUCOSE BLD-MCNC: 101 MG/DL (ref 70–99)
GLUCOSE BLD-MCNC: 122 MG/DL (ref 70–99)
GLUCOSE BLD-MCNC: 67 MG/DL (ref 70–99)
GLUCOSE BLD-MCNC: 75 MG/DL (ref 70–99)
HCG(URINE) PREGNANCY TEST: NEGATIVE
PERFORMED ON: ABNORMAL
POTASSIUM SERPL-SCNC: 4.3 MMOL/L (ref 3.5–5.1)
SODIUM BLD-SCNC: 140 MMOL/L (ref 136–145)

## 2022-02-10 PROCEDURE — 6360000002 HC RX W HCPCS

## 2022-02-10 PROCEDURE — 3600000004 HC SURGERY LEVEL 4 BASE: Performed by: SURGERY

## 2022-02-10 PROCEDURE — 2709999900 HC NON-CHARGEABLE SUPPLY: Performed by: SURGERY

## 2022-02-10 PROCEDURE — 6360000002 HC RX W HCPCS: Performed by: ANESTHESIOLOGY

## 2022-02-10 PROCEDURE — 86850 RBC ANTIBODY SCREEN: CPT

## 2022-02-10 PROCEDURE — 6360000002 HC RX W HCPCS: Performed by: NURSE ANESTHETIST, CERTIFIED REGISTERED

## 2022-02-10 PROCEDURE — 2500000003 HC RX 250 WO HCPCS: Performed by: ANESTHESIOLOGY

## 2022-02-10 PROCEDURE — 7100000000 HC PACU RECOVERY - FIRST 15 MIN: Performed by: SURGERY

## 2022-02-10 PROCEDURE — 36821 AV FUSION DIRECT ANY SITE: CPT | Performed by: SURGERY

## 2022-02-10 PROCEDURE — 80048 BASIC METABOLIC PNL TOTAL CA: CPT

## 2022-02-10 PROCEDURE — 64415 NJX AA&/STRD BRCH PLXS IMG: CPT | Performed by: ANESTHESIOLOGY

## 2022-02-10 PROCEDURE — 2580000003 HC RX 258: Performed by: NURSE PRACTITIONER

## 2022-02-10 PROCEDURE — 3600000014 HC SURGERY LEVEL 4 ADDTL 15MIN: Performed by: SURGERY

## 2022-02-10 PROCEDURE — 6370000000 HC RX 637 (ALT 250 FOR IP): Performed by: SURGERY

## 2022-02-10 PROCEDURE — 3700000001 HC ADD 15 MINUTES (ANESTHESIA): Performed by: SURGERY

## 2022-02-10 PROCEDURE — 86900 BLOOD TYPING SEROLOGIC ABO: CPT

## 2022-02-10 PROCEDURE — 86901 BLOOD TYPING SEROLOGIC RH(D): CPT

## 2022-02-10 PROCEDURE — 2580000003 HC RX 258: Performed by: SURGERY

## 2022-02-10 PROCEDURE — 7100000011 HC PHASE II RECOVERY - ADDTL 15 MIN: Performed by: SURGERY

## 2022-02-10 PROCEDURE — 3700000000 HC ANESTHESIA ATTENDED CARE: Performed by: SURGERY

## 2022-02-10 PROCEDURE — A4217 STERILE WATER/SALINE, 500 ML: HCPCS | Performed by: SURGERY

## 2022-02-10 PROCEDURE — 7100000010 HC PHASE II RECOVERY - FIRST 15 MIN: Performed by: SURGERY

## 2022-02-10 PROCEDURE — 2500000003 HC RX 250 WO HCPCS: Performed by: NURSE ANESTHETIST, CERTIFIED REGISTERED

## 2022-02-10 PROCEDURE — 7100000001 HC PACU RECOVERY - ADDTL 15 MIN: Performed by: SURGERY

## 2022-02-10 PROCEDURE — 84703 CHORIONIC GONADOTROPIN ASSAY: CPT

## 2022-02-10 PROCEDURE — 6360000002 HC RX W HCPCS: Performed by: NURSE PRACTITIONER

## 2022-02-10 PROCEDURE — 36415 COLL VENOUS BLD VENIPUNCTURE: CPT

## 2022-02-10 PROCEDURE — 6360000002 HC RX W HCPCS: Performed by: SURGERY

## 2022-02-10 RX ORDER — OXYCODONE HYDROCHLORIDE 5 MG/1
5 TABLET ORAL EVERY 6 HOURS PRN
Qty: 20 TABLET | Refills: 0 | Status: SHIPPED | OUTPATIENT
Start: 2022-02-10 | End: 2022-02-15

## 2022-02-10 RX ORDER — LIDOCAINE HYDROCHLORIDE 20 MG/ML
INJECTION, SOLUTION EPIDURAL; INFILTRATION; INTRACAUDAL; PERINEURAL PRN
Status: DISCONTINUED | OUTPATIENT
Start: 2022-02-10 | End: 2022-02-10 | Stop reason: SDUPTHER

## 2022-02-10 RX ORDER — PROTAMINE SULFATE 10 MG/ML
INJECTION, SOLUTION INTRAVENOUS PRN
Status: DISCONTINUED | OUTPATIENT
Start: 2022-02-10 | End: 2022-02-10 | Stop reason: SDUPTHER

## 2022-02-10 RX ORDER — ONDANSETRON 2 MG/ML
4 INJECTION INTRAMUSCULAR; INTRAVENOUS
Status: DISCONTINUED | OUTPATIENT
Start: 2022-02-10 | End: 2022-02-10 | Stop reason: HOSPADM

## 2022-02-10 RX ORDER — FENTANYL CITRATE 50 UG/ML
25 INJECTION, SOLUTION INTRAMUSCULAR; INTRAVENOUS EVERY 5 MIN PRN
Status: DISCONTINUED | OUTPATIENT
Start: 2022-02-10 | End: 2022-02-10 | Stop reason: HOSPADM

## 2022-02-10 RX ORDER — DIPHENHYDRAMINE HYDROCHLORIDE 50 MG/ML
12.5 INJECTION INTRAMUSCULAR; INTRAVENOUS
Status: DISCONTINUED | OUTPATIENT
Start: 2022-02-10 | End: 2022-02-10 | Stop reason: HOSPADM

## 2022-02-10 RX ORDER — SODIUM CHLORIDE 9 MG/ML
25 INJECTION, SOLUTION INTRAVENOUS PRN
Status: DISCONTINUED | OUTPATIENT
Start: 2022-02-10 | End: 2022-02-10 | Stop reason: HOSPADM

## 2022-02-10 RX ORDER — HYDROCODONE BITARTRATE AND ACETAMINOPHEN 5; 325 MG/1; MG/1
2 TABLET ORAL PRN
Status: DISCONTINUED | OUTPATIENT
Start: 2022-02-10 | End: 2022-02-10 | Stop reason: HOSPADM

## 2022-02-10 RX ORDER — HYDROMORPHONE HCL 110MG/55ML
0.5 PATIENT CONTROLLED ANALGESIA SYRINGE INTRAVENOUS EVERY 5 MIN PRN
Status: DISCONTINUED | OUTPATIENT
Start: 2022-02-10 | End: 2022-02-10 | Stop reason: HOSPADM

## 2022-02-10 RX ORDER — LABETALOL HYDROCHLORIDE 5 MG/ML
5 INJECTION, SOLUTION INTRAVENOUS EVERY 10 MIN PRN
Status: DISCONTINUED | OUTPATIENT
Start: 2022-02-10 | End: 2022-02-10 | Stop reason: HOSPADM

## 2022-02-10 RX ORDER — PROMETHAZINE HYDROCHLORIDE 25 MG/ML
6.25 INJECTION, SOLUTION INTRAMUSCULAR; INTRAVENOUS
Status: DISCONTINUED | OUTPATIENT
Start: 2022-02-10 | End: 2022-02-10 | Stop reason: HOSPADM

## 2022-02-10 RX ORDER — MIDAZOLAM HYDROCHLORIDE 2 MG/2ML
2 INJECTION, SOLUTION INTRAMUSCULAR; INTRAVENOUS ONCE
Status: COMPLETED | OUTPATIENT
Start: 2022-02-10 | End: 2022-02-10

## 2022-02-10 RX ORDER — PROPOFOL 10 MG/ML
INJECTION, EMULSION INTRAVENOUS CONTINUOUS PRN
Status: DISCONTINUED | OUTPATIENT
Start: 2022-02-10 | End: 2022-02-10 | Stop reason: SDUPTHER

## 2022-02-10 RX ORDER — HYDRALAZINE HYDROCHLORIDE 20 MG/ML
INJECTION INTRAMUSCULAR; INTRAVENOUS
Status: COMPLETED
Start: 2022-02-10 | End: 2022-02-10

## 2022-02-10 RX ORDER — SODIUM CHLORIDE 0.9 % (FLUSH) 0.9 %
10 SYRINGE (ML) INJECTION PRN
Status: DISCONTINUED | OUTPATIENT
Start: 2022-02-10 | End: 2022-02-10 | Stop reason: HOSPADM

## 2022-02-10 RX ORDER — FENTANYL CITRATE 50 UG/ML
50 INJECTION, SOLUTION INTRAMUSCULAR; INTRAVENOUS EVERY 5 MIN PRN
Status: DISCONTINUED | OUTPATIENT
Start: 2022-02-10 | End: 2022-02-10 | Stop reason: HOSPADM

## 2022-02-10 RX ORDER — HYDRALAZINE HYDROCHLORIDE 20 MG/ML
10 INJECTION INTRAMUSCULAR; INTRAVENOUS ONCE
Status: COMPLETED | OUTPATIENT
Start: 2022-02-10 | End: 2022-02-10

## 2022-02-10 RX ORDER — SODIUM CHLORIDE 0.9 % (FLUSH) 0.9 %
10 SYRINGE (ML) INJECTION EVERY 12 HOURS SCHEDULED
Status: DISCONTINUED | OUTPATIENT
Start: 2022-02-10 | End: 2022-02-10 | Stop reason: HOSPADM

## 2022-02-10 RX ORDER — PROPOFOL 10 MG/ML
INJECTION, EMULSION INTRAVENOUS PRN
Status: DISCONTINUED | OUTPATIENT
Start: 2022-02-10 | End: 2022-02-10 | Stop reason: SDUPTHER

## 2022-02-10 RX ORDER — HEPARIN SODIUM 1000 [USP'U]/ML
INJECTION, SOLUTION INTRAVENOUS; SUBCUTANEOUS PRN
Status: DISCONTINUED | OUTPATIENT
Start: 2022-02-10 | End: 2022-02-10 | Stop reason: SDUPTHER

## 2022-02-10 RX ORDER — SODIUM CHLORIDE 9 MG/ML
INJECTION, SOLUTION INTRAVENOUS CONTINUOUS
Status: DISCONTINUED | OUTPATIENT
Start: 2022-02-10 | End: 2022-02-10 | Stop reason: HOSPADM

## 2022-02-10 RX ORDER — HYDROMORPHONE HCL 110MG/55ML
0.25 PATIENT CONTROLLED ANALGESIA SYRINGE INTRAVENOUS EVERY 5 MIN PRN
Status: DISCONTINUED | OUTPATIENT
Start: 2022-02-10 | End: 2022-02-10 | Stop reason: HOSPADM

## 2022-02-10 RX ORDER — HYDROCODONE BITARTRATE AND ACETAMINOPHEN 5; 325 MG/1; MG/1
1 TABLET ORAL PRN
Status: DISCONTINUED | OUTPATIENT
Start: 2022-02-10 | End: 2022-02-10 | Stop reason: HOSPADM

## 2022-02-10 RX ORDER — MEPERIDINE HYDROCHLORIDE 25 MG/ML
12.5 INJECTION INTRAMUSCULAR; INTRAVENOUS; SUBCUTANEOUS EVERY 5 MIN PRN
Status: DISCONTINUED | OUTPATIENT
Start: 2022-02-10 | End: 2022-02-10 | Stop reason: HOSPADM

## 2022-02-10 RX ORDER — ROPIVACAINE HYDROCHLORIDE 5 MG/ML
INJECTION, SOLUTION EPIDURAL; INFILTRATION; PERINEURAL
Status: COMPLETED | OUTPATIENT
Start: 2022-02-10 | End: 2022-02-10

## 2022-02-10 RX ORDER — DEXTROSE MONOHYDRATE 25 G/50ML
25 INJECTION, SOLUTION INTRAVENOUS PRN
Status: DISCONTINUED | OUTPATIENT
Start: 2022-02-10 | End: 2022-02-10 | Stop reason: HOSPADM

## 2022-02-10 RX ADMIN — DEXTROSE MONOHYDRATE 25 G: 25 INJECTION, SOLUTION INTRAVENOUS at 08:25

## 2022-02-10 RX ADMIN — MIDAZOLAM HYDROCHLORIDE 2 MG: 1 INJECTION, SOLUTION INTRAMUSCULAR; INTRAVENOUS at 08:26

## 2022-02-10 RX ADMIN — HEPARIN SODIUM 5000 UNITS: 1000 INJECTION INTRAVENOUS; SUBCUTANEOUS at 09:25

## 2022-02-10 RX ADMIN — PROTAMINE SULFATE 30 MG: 10 INJECTION, SOLUTION INTRAVENOUS at 09:47

## 2022-02-10 RX ADMIN — ROPIVACAINE HYDROCHLORIDE 20 ML: 5 INJECTION, SOLUTION EPIDURAL; INFILTRATION; PERINEURAL at 08:30

## 2022-02-10 RX ADMIN — SODIUM CHLORIDE: 9 INJECTION, SOLUTION INTRAVENOUS at 08:12

## 2022-02-10 RX ADMIN — LIDOCAINE HYDROCHLORIDE 50 MG: 20 INJECTION, SOLUTION EPIDURAL; INFILTRATION; INTRACAUDAL; PERINEURAL at 09:00

## 2022-02-10 RX ADMIN — HYDRALAZINE HYDROCHLORIDE 10 MG: 20 INJECTION INTRAMUSCULAR; INTRAVENOUS at 11:49

## 2022-02-10 RX ADMIN — PROPOFOL 100 MCG/KG/MIN: 10 INJECTION, EMULSION INTRAVENOUS at 09:00

## 2022-02-10 RX ADMIN — CEFAZOLIN 2000 MG: 10 INJECTION, POWDER, FOR SOLUTION INTRAVENOUS at 08:53

## 2022-02-10 RX ADMIN — PROPOFOL 40 MG: 10 INJECTION, EMULSION INTRAVENOUS at 09:00

## 2022-02-10 RX ADMIN — LABETALOL HYDROCHLORIDE 5 MG: 5 INJECTION INTRAVENOUS at 11:22

## 2022-02-10 RX ADMIN — LABETALOL HYDROCHLORIDE 5 MG: 5 INJECTION INTRAVENOUS at 11:35

## 2022-02-10 RX ADMIN — PROPOFOL 20 MG: 10 INJECTION, EMULSION INTRAVENOUS at 09:03

## 2022-02-10 ASSESSMENT — PULMONARY FUNCTION TESTS
PIF_VALUE: 1

## 2022-02-10 ASSESSMENT — PAIN - FUNCTIONAL ASSESSMENT: PAIN_FUNCTIONAL_ASSESSMENT: 0-10

## 2022-02-10 ASSESSMENT — ENCOUNTER SYMPTOMS: SHORTNESS OF BREATH: 1

## 2022-02-10 NOTE — ANESTHESIA PROCEDURE NOTES
Peripheral Block    Patient location during procedure: pre-op  Start time: 2/10/2022 8:31 AM  End time: 2/10/2022 8:31 AM  Staffing  Performed: anesthesiologist   Preanesthetic Checklist  Completed: patient identified, IV checked, site marked, risks and benefits discussed, surgical consent, monitors and equipment checked, pre-op evaluation, timeout performed, anesthesia consent given, oxygen available and patient being monitored  Peripheral Block  Patient position: sitting  Prep: ChloraPrep  Patient monitoring: cardiac monitor, continuous pulse ox, frequent blood pressure checks and IV access  Block type: Brachial plexus  Laterality: right  Injection technique: single-shot  Guidance: ultrasound guided  Local infiltration: lidocaine  Infiltration strength: 1 %  Dose: 3 mL  Interscalene  Provider prep: mask and sterile gloves  Local infiltration: lidocaine  Needle  Needle gauge: 21 G  Needle length: 10 cm  Needle localization: ultrasound guidance  Test dose: negative  Assessment  Injection assessment: negative aspiration for heme, no paresthesia on injection and local visualized surrounding nerve on ultrasound  Paresthesia pain: none  Slow fractionated injection: yes  Hemodynamics: stable  Additional Notes  U/S 84883.  (1) Under ultrasound guidance, a  gauge needle was inserted and placed in close proximity to the  nerve.  (2) Ultrasound was also used to visualize the spread of the anesthetic in close proximity to the nerve being blocked. (3) The nerve appeared anatomically normal, and (4 there were no apparent abnormal pathological findings on the image that were readily visible and related to the nerve being blocked. (5) A permanent ultrasound image was saved in the patient's record.             Medications Administered  Ropivacaine (NAROPIN) injection 0.5%, 20 mL  Reason for block: post-op pain management and at surgeon's request

## 2022-02-10 NOTE — PROGRESS NOTES
CLINICAL PHARMACY NOTE: MEDS TO BEDS    Total # of Prescriptions Filled: 1   The following medications were delivered to the patient:  · Oxycodone 5mg    Additional Documentation:    Medication was delivered to patient's room and patient's spouse signed for    Soledad Lama

## 2022-02-10 NOTE — PROGRESS NOTES
Patient's BP has not responded to labetalol, Dr. Mason Gonzalez informed, order received for hydralazine.

## 2022-02-10 NOTE — H&P
Consultation/History & Physical    Date of Admission:  2/10/2022  Date of Consultation:  2/10/2022    PCP:  Orlando Brown     Chief Complaint: ESRD    History of Present Illness:  Desirae Robertson is a 55 y.o. female who presents with ESRD on HD. Presents today for new access creation in the right arm    PMH:   has a past medical history of Blind in both eyes, Cerebral artery occlusion with cerebral infarction ([address] CHF (congestive heart failure) ([address] Chronic kidney disease, Depression, Diabetes mellitus out of control ([address] Diabetes mellitus, type II ([address] Diabetic neuropathy associated with type 2 diabetes mellitus ([address] Generalized headaches, Hypertension, Infertility, Insomnia, Migraine headache, Mixed hyperlipidemia, Otitis media, Pelvic abscess in female, Pneumonia, Stroke ([address] and Stroke ([address] PSH:   has a past surgical history that includes Cervix surgery; eye surgery; Foot surgery (Right); Foot surgery (Bilateral); Foot surgery (Left); and IR TUNNELED CVC PLACE WO SQ PORT/PUMP > 5 YEARS (9/7/2021). Allergies: Allergies   Allergen Reactions    Amoxicillin Hives, Itching and Other (See Comments)     Tolerates cephalosporins  Patient tolerating cefazolin (ANCEF) as of October 11, 2018      Levofloxacin Anaphylaxis    Vancomycin Anaphylaxis, Hives and Shortness Of Breath    Tape [Adhesive Tape] Other (See Comments)     Paper tape turns skin bright red. Plastic tape okay. Home Meds:    Prior to Admission medications    Medication Sig Start Date End Date Taking? Authorizing Provider   aspirin 81 MG EC tablet Take 1 tablet by mouth daily 9/1/20  Yes Claudia العلي MD   lisinopril (PRINIVIL;ZESTRIL) 10 MG tablet Take 1 tablet by mouth daily 1/21/22   Kuldeep Giles MD   ALPRAZolam (XANAX XR) 3 MG extended release tablet Take 1 tablet by mouth every morning for 3 days.  1/5/22 1/8/22  Beth Childers MD   pregabalin (LYRICA) 75 MG capsule TAKE ONE CAPSULE BY MOUTH EVERY NIGHT 1/3/22 2/2/22  Damon Mireles   Heat Wraps [facility] EUSEBIA BACK/HIP) [address] 1 each by Does not apply route daily as needed (back pain) 12/15/21   Ruddy Chu   methyl salicylate-menthol (RANDI LEBLANC GREASELESS) 10-15 % CREA Apply topically 3 times daily as needed for Pain 12/15/21   Ruddy Chu   buPROPion (WELLBUTRIN XL) 150 MG extended release tablet Take 1 tablet by mouth every morning 12/15/21   Damon Mireles   propranolol (INDERAL LA) 80 MG extended release capsule TAKE ONE CAPSULE BY MOUTH EVERY MORNING 12/10/21   Damon Mireles   hydrOXYzine (ATARAX) 25 MG tablet  10/14/21   Historical Provider, MD   benzonatate (TESSALON) 200 MG capsule Take 1 capsule by mouth every 8 hours as needed for Cough 11/30/21   Damon Mireles   albuterol sulfate  (90 Base) MCG/ACT inhaler Inhale 2 puffs into the lungs every 6 hours as needed for Wheezing or Shortness of Breath 11/30/21   Damon Mireles   fluvoxaMINE (LUVOX) 100 MG tablet Take 1 tablet by mouth nightly 11/30/21 12/30/21  Damon Mireles   Dulaglutide 1.5 MG/0.5ML SOPN Inject 1.5 mg into the skin once a week  Patient taking differently: Inject 1.5 mg into the skin once a week FRIDAYS 11/30/21   [address]  Devices (PULSE OXIMETER) MISC 1 each by Does not apply route every 6-8 hours as needed (shortness of breath) 11/1/21   Damon Mireles   Nasal Dilators (BREATHE RIGHT EXTRA STRENGTH) STRP 1 strip by Does not apply route nightly as needed (nasal congestion) 11/1/21   Damon Mireles   insulin glargine (LANTUS SOLOSTAR) 100 UNIT/ML injection pen Inject 10 Units into the skin every morning     Historical Provider, MD   glycopyrrolate-formoterol (BEVESPI AEROSPHERE) 9-4.8 MCG/ACT AERO Inhale 2 puffs into the lungs 2 times daily 9/23/21   Damon Mireles   glucose (GLUTOSE) 40 % GEL Take 37.5 mLs by mouth as needed (low blood sugar) 9/13/21   Dewayne Louis MD   atorvastatin (LIPITOR) 40 MG tablet Take 1 tablet by mouth nightly 7/8/21   Damon Mireles   Insulin Pen Needle 29G X 12.7MM MISC 1 each by Does not apply route daily 7/8/21   Damon Mireles   insulin lispro, 1 Unit Dial, 100 UNIT/ML SOPN Inject 0-6 Units into the skin 3 times daily (with meals) **Corrective Low Dose Algorithm**  Glucose: Dose:               No Insulin  140-199 1 Unit  200-249 2 Units  250-299 3 Units  300-349 4 Units  350-399 5 Units  Over 399 6 Units 4/29/20   Herb Laura MD        [facility] Meds:    Current Facility-Administered Medications   Medication Dose Route Frequency Provider Last Rate Last Admin    meperidine (DEMEROL) injection 12.5 mg  12.5 mg IntraVENous Q5 Min PRN Carla Phillips MD        HYDROmorphone (DILAUDID) injection 0.25 mg  0.25 mg IntraVENous Q5 Min PRN Carla Phillips MD        HYDROmorphone (DILAUDID) injection 0.5 mg  0.5 mg IntraVENous Q5 Min PRN Carla Phillips MD        fentaNYL (SUBLIMAZE) injection 25 mcg  25 mcg IntraVENous Q5 Min PRN Carla Phillips MD        fentaNYL (SUBLIMAZE) injection 50 mcg  50 mcg IntraVENous Q5 Min PRN Carla Phillips MD        HYDROcodone-acetaminophen [facility]) 5-325 MG per tablet 1 tablet  1 tablet Oral PRN Carla Phillips MD        Or    HYDROcodone-acetaminophen [facility]) 5-325 MG per tablet 2 tablet  2 tablet Oral PRN Carla Phillips MD        ondansetron [facility]) injection 4 mg  4 mg IntraVENous Once PRN Carla Phillips MD        promethazine [facility]) injection 6.25 mg  6.25 mg IntraMUSCular Once PRN Carla Phillips MD        diphenhydrAMINE (BENADRYL) injection 12.5 mg  12.5 mg IntraVENous Once PRN Carla Phillips MD        labetalol (NORMODYNE;TRANDATE) injection 5 mg  5 mg IntraVENous Q10 Min PRN Carla Phillips MD        0.9 % sodium chloride infusion   IntraVENous Continuous Lita Cummaquid, APRN -  mL/hr at 02/10/22 0812 New Bag at 02/10/22 0812    0.9 % sodium chloride infusion  25 mL IntraVENous PRN Lita Jeffery, APRN - CNP        ceFAZolin (ANCEF) 2000 mg in dextrose 5 % 50 mL IVPB  2,000 mg IntraVENous On Call to [address] [place] [address], APRN - CNP        sodium chloride flush 0.9 % injection 10 mL  10 mL IntraVENous 2 times per day Lora Hurley APRN - CNP        sodium chloride flush 0.9 % injection 10 mL  10 mL IntraVENous PRN Lora Hurley, APRN - CNP        midazolam PF (VERSED) injection 2 mg  2 mg IntraVENous Once Scot Escalante MD        dextrose 50 % IV solution  25 g IntraVENous PRN Scot Escalante MD           Social History:       Social History     Socioeconomic History    Marital status:      Spouse name: Layla Howard Number of children: 0    Years of education: None    Highest education level: None   Occupational History    Occupation: works as    Tobacco Use    Smoking status: Former Smoker     Packs/day: 1.00     Types: Cigarettes    Smokeless tobacco: Never Used    Tobacco comment: Quit in August 2021   Vaping Use    Vaping Use: Never used   Substance and Sexual Activity    Alcohol use: No     Comment: Very Rare    Drug use: No    Sexual activity: Yes     Partners: Male   Other Topics Concern    None   Social History Narrative    None     Social Determinants of Health     Financial Resource Strain:     Difficulty of Paying Living Expenses: Not on file   Food Insecurity:     Worried About Running Out of Food in the Last Year: Not on file    Chris of Food in the Last Year: Not on file   Transportation Needs:     Lack of Transportation (Medical): Not on file    Lack of Transportation (Non-Medical):  Not on file   Physical Activity:     Days of Exercise per Week: Not on file    Minutes of Exercise per Session: Not on file   Stress:     Feeling of Stress : Not on file   Social Connections:     Frequency of Communication with Friends and Family: Not on file    Frequency of Social Gatherings with Friends and Family: Not on file    Attends Roman Catholic Services: Not on file   [facility] Active Member of Clubs or Organizations: Not on file    Attends Club or Organization Meetings: Not on file    Marital Status: Not on file   Intimate Partner Violence:     Fear of Current or Ex-Partner: Not on file    Emotionally Abused: Not on file    Physically Abused: Not on file    Sexually Abused: Not on file   Housing Stability:     Unable to Pay for Housing in the Last Year: Not on file    Number of [place] in the Last Year: Not on file    Unstable Housing in the Last Year: Not on file       Family Histroy:      Family History   Problem Relation Age of Onset    Diabetes Mother    [address] [facility] Other Mother 79        Covid    Diabetes Father     High Blood Pressure Father     Colon Cancer Father     Diabetes Sister     Alcohol Abuse Maternal Grandfather     Diabetes Paternal Grandmother     Alcohol Abuse Paternal Grandfather     Diabetes Paternal Aunt     Diabetes Paternal Uncle        Review of Systems:  Review of systems performed and negative with the exception of the above findings        Physical Exam   Vital Signs: BP (!) 147/92   Pulse 90   Temp 97.4 °F (36.3 °C) (Temporal)   Resp 16   Ht 5' 7\" (1.702 m)   Wt 178 lb (80.7 kg)   SpO2 100%   BMI 27.88 kg/m²        Admission Weight: 185 lb (83.9 kg)     General appearance: alert, appears stated age, cooperative and no distress  Head: Normocephalic, without obvious abnormality, atraumatic  Lungs: clear to auscultation bilaterally  Heart: regular rate and rhythm, S1, S2 normal, no murmur, click, rub or gallop  Abdomen: soft, non-tender.  Bowel sounds normal. No masses,  no organomegaly  Extremities: extremities normal, atraumatic, no cyanosis or edema  Pulses:      Labs:    BMP:   Lab Results   Component Value Date     02/07/2022    K 5.3 02/07/2022    K 4.5 01/05/2022     02/07/2022    CO2 22 02/07/2022    PHOS 6.1 01/21/2022    PHOS 6.4 01/21/2022    BUN 64 02/07/2022    CREATININE 8.5 02/07/2022      No results found for: GLU  Lab involving maculae, associated with type 2 diabetes mellitus (HCC)    Cellulitis of right foot    Hidradenitis suppurativa    Hypocalcemia    Non-toxic multinodular goiter    Polyneuropathy due to type 2 diabetes mellitus (HCC)    Proliferative diabetic retinopathy associated with type 2 diabetes mellitus ([facility]    ESRD (end stage renal disease) ([facility]    Acute respiratory failure with hypoxemia (HCC)    Acute respiratory failure due to COVID-19 ([facility]    Suspected COVID-19 virus infection    Epiglottitis    Recurrent falls           Plan:  Right arm fistula, possible AV graft        Christiano Guo M.D., FACS.   2/10/2022  8:30 AM

## 2022-02-10 NOTE — PROGRESS NOTES
Patient transferred from OR to PACU, not yet responding to physical stimuli, oral airway in place, VSS, right arm dressing CDI, will monitor.

## 2022-02-10 NOTE — OP NOTE
Operative Note      Patient: Mary Early  YOB: 1975  MRN: [id number]    Date of Procedure: 2/10/2022    Pre-Op Diagnosis: N18.6 END STAGE RENAL DISEASE    Post-Op Diagnosis: Same       Procedure(s):  RIGHT BRACHIO CEPHALIC FISTULA CREATION    Surgeon(s):  Celia Lee MD    Assistant:   First Assistant: Jennifer Philippe; [facility]    Anesthesia: General    Estimated Blood Loss (mL): Minimal    Complications: None    Specimens:   * No specimens in log *    Implants:  * No implants in log *      Drains:   [REMOVED] Urethral Catheter Straight-tip; Non-latex 16 fr (Removed)       Findings: as above    Detailed Description of Procedure: Indications:   Mary Early is a 55 y.o. female with ESRD on HD                   Nephrologist:  Jacob Fagan  Hand Dominance:  left  Antibiotics: Ancef    Procedure:   Right brachiocephalic fistula    After witnessed informed consent was obtained the patient was brought to the operating room and placed in the supine position. Block anesthesia was administered and found to be adequate. The Right arm was prepped and draped in a sterile fashion. After injection of local anesthesia, the incision was made and carried through the superficial fascia. The cephalic vein was exposed and tributaries were ligated with 4-0 silk ties and divided. The cephalic vein was circumferentially mobilized. It was marked anteriorly with a surgical marker to ensure no twisting of the vein. Moving to the brachial artery, it was fully mobilized and its branches were ligated and divided to assist in elevating the artery above the fascial layer. It was noted to be of good caliper. The vein was divided and its transected end was irrigated with a heparin solution to allow for gentle mechanical dilatation of the vein. The artery was occluded with vascular clamps and an arteriotomy was made.    An end of vein to side of the artery anastomosis was created with 6-0 prolene suture in a parachute technique. The distal arterial clamp was removed and inspection of the suture line for bleeding was made. The proximal arterial clamp was removed. Hemostasis was obtained. The course of the outflow vein was checked to remove any fascial bands and to assure that there were no kinks or twists. Wound closure was accomplished with 3-0 Vicryl in the superficial fascia and 4-0 Vicryl in the skin. A gauze and Tegaderm dressing was applied. The patient was transferred to the [address] [place] [address] Unit in good condition. Fide Holman M.D., FACS.   2/10/2022  9:56 AM      Electronically signed by David Hall MD on 2/10/2022 at 9:56 AM

## 2022-02-10 NOTE — PROGRESS NOTES
Time out complete, pt.  Medicated with 2 mg versed, placed on 02 and pulse ox, block performed by Dr. Sanjeev Cook, D50 given for FSBS 67

## 2022-02-10 NOTE — ANESTHESIA POSTPROCEDURE EVALUATION
Department of Anesthesiology  Postprocedure Note    Patient: Kang Ontiveros  MRN: [id number]  YOB: 1975  Date of evaluation: 2/10/2022  Time:  10:46 AM     Procedure Summary     Date: 02/10/22 Room / Location: [address]    Anesthesia Start: 4424 Anesthesia Stop: 1010    Procedure: RIGHT BRACHIO CEPHALIC FISTULA CREATION (Right ) Diagnosis: (N18.6 END STAGE RENAL DISEASE)    Surgeons: Amos Hogue MD Responsible Provider: Emily Richard MD    Anesthesia Type: MAC, regional ASA Status: 3          Anesthesia Type: MAC, regional    Kay Phase I: Kay Score: 5    Kay Phase II:      Last vitals: Reviewed and per EMR flowsheets.        Anesthesia Post Evaluation    Patient location during evaluation: PACU  Patient participation: complete - patient participated  Level of consciousness: awake and alert  Pain score: 2  Airway patency: patent  Nausea & Vomiting: no vomiting  Complications: no  Cardiovascular status: blood pressure returned to baseline  Respiratory status: acceptable  Hydration status: euvolemic  Multimodal analgesia pain management approach

## 2022-02-10 NOTE — ANESTHESIA PRE PROCEDURE
Department of Anesthesiology  Preprocedure Note       Name:  Mary Stephens   Age:  55 y.o.  :  1975                                          MRN:  [id number]         Date:  2/10/2022      Surgeon: Eva Mendoza):  Ashwin Arzola MD    Procedure: Procedure(s):  RIGHT BRACHIO CEPHALIC FISTULA CREATION  POSSIBLE RIGHT ARM ARTERIO VENOUS GRAFT    Medications prior to admission:   Prior to Admission medications    Medication Sig Start Date End Date Taking? Authorizing Provider   lisinopril (PRINIVIL;ZESTRIL) 10 MG tablet Take 1 tablet by mouth daily 22   Gaurav Hastings MD   ALPRAZolam (XANAX XR) 3 MG extended release tablet Take 1 tablet by mouth every morning for 3 days.  22  Bentley Gonzalez MD   pregabalin (LYRICA) 75 MG capsule TAKE ONE CAPSULE BY MOUTH EVERY NIGHT 1/3/22 2/2/22  Damon Mireles   Heat Wraps [facility] EUSEBIA BACK/HIP) MISC 1 each by Does not apply route daily as needed (back pain) 12/15/21   Kaz Maxwell   methyl salicylate-menthol (RANDI LEBLANC GREASELESS) 10-15 % CREA Apply topically 3 times daily as needed for Pain 12/15/21   Kaz Maxwell   buPROPion (WELLBUTRIN XL) 150 MG extended release tablet Take 1 tablet by mouth every morning 12/15/21   Damon Mireles   propranolol (INDERAL LA) 80 MG extended release capsule TAKE ONE CAPSULE BY MOUTH EVERY MORNING 12/10/21   Damon Mireles   hydrOXYzine (ATARAX) 25 MG tablet  10/14/21   Historical Provider, MD   benzonatate (TESSALON) 200 MG capsule Take 1 capsule by mouth every 8 hours as needed for Cough 21   Damon Mireles   albuterol sulfate  (90 Base) MCG/ACT inhaler Inhale 2 puffs into the lungs every 6 hours as needed for Wheezing or Shortness of Breath 21   Damon Mireles   fluvoxaMINE (LUVOX) 100 MG tablet Take 1 tablet by mouth nightly 21  Damon Mireles   Dulaglutide 1.5 MG/0.5ML SOPN Inject 1.5 mg into the skin once a week  Patient taking differently: Inject 1.5 mg into the skin once a week FRIDAYS 11/30/21   Damon Mireles   Misc. Devices (PULSE OXIMETER) MISC 1 each by Does not apply route every 6-8 hours as needed (shortness of breath) 11/1/21   Damon Mireles   Nasal Dilators (BREATHE RIGHT EXTRA STRENGTH) STRP 1 strip by Does not apply route nightly as needed (nasal congestion) 11/1/21   Damon Mireles   insulin glargine (LANTUS SOLOSTAR) 100 UNIT/ML injection pen Inject 10 Units into the skin every morning     Historical Provider, MD   glycopyrrolate-formoterol (BEVESPI AEROSPHERE) 9-4.8 MCG/ACT AERO Inhale 2 puffs into the lungs 2 times daily 9/23/21   Damon Mireles   glucose (GLUTOSE) 40 % GEL Take 37.5 mLs by mouth as needed (low blood sugar) 9/13/21   Haris Dunn MD   atorvastatin (LIPITOR) 40 MG tablet Take 1 tablet by mouth nightly 7/8/21   Damon Mireles   Insulin Pen Needle 29G X 12.7MM MISC 1 each by Does not apply route daily 7/8/21   Hina Brent   aspirin 81 MG EC tablet Take 1 tablet by mouth daily 9/1/20   Delisa Ceron MD   insulin lispro, 1 Unit Dial, 100 UNIT/ML SOPN Inject 0-6 Units into the skin 3 times daily (with meals) **Corrective Low Dose Algorithm**  Glucose: Dose:               No Insulin  140-199 1 Unit  200-249 2 Units  250-299 3 Units  300-349 4 Units  350-399 5 Units  Over 399 6 Units 4/29/20   Carolynn Harmon MD       Current medications:    No current outpatient medications on file. No current facility-administered medications for this visit. Allergies: Allergies   Allergen Reactions    Amoxicillin Hives, Itching and Other (See Comments)     Tolerates cephalosporins  Patient tolerating cefazolin (ANCEF) as of October 11, 2018      Levofloxacin Anaphylaxis    Vancomycin Anaphylaxis, Hives and Shortness Of Breath    Tape [Adhesive Tape] Other (See Comments)     Paper tape turns skin bright red. Plastic tape okay.         Problem List:    Patient Active Problem List   Diagnosis Code    Type 2 diabetes  [facility]erebral artery occlusion with [facility] infarction [facility]     CHF (congestive heart failure) (HCC)     Chronic kidney disease     Depression     Diabetes mellitus out of control ([facility]     Diabetes mellitus, type II ([facility]     2005    Diabetic neuropathy associated with type 2 diabetes mellitus ([facility]     Generalized headaches     Hypertension     Infertility     Insomnia     chronic vs lack of time spent to sleep    Migraine headache 11/09/2011    Mixed hyperlipidemia     Otitis media     h/o recurrent    Pelvic abscess in female 10/05/2013    Pneumonia     2004 approx.  Stroke ([facility] 08/27/2020    Stroke ([facility] 08/27/2021       Past Surgical History:        Procedure Laterality Date    CERVIX SURGERY      laser tx for dysplasia;1992    EYE SURGERY      FOOT SURGERY Right     FOOT SURGERY Bilateral     FOOT SURGERY Left     IR TUNNELED CATHETER PLACEMENT GREATER THAN 5 YEARS  9/7/2021    IR TUNNELED CATHETER PLACEMENT GREATER THAN 5 YEARS 9/7/2021 Etta Martinez MD [facility] SPECIAL PROCEDURES       Social History:    Social History     Tobacco Use    Smoking status: Former Smoker     Packs/day: 1.00     Types: Cigarettes    Smokeless tobacco: Never Used    Tobacco comment: Quit in August 2021   Substance Use Topics    Alcohol use: No     Comment: Very Rare                                Counseling given: Not Answered  Comment: Quit in August 2021      Vital Signs (Current): There were no vitals filed for this visit.                                            BP Readings from Last 3 Encounters:   01/21/22 129/66   01/06/22 136/81   12/29/21 100/60       NPO Status:                                                                                 BMI:   Wt Readings from Last 3 Encounters:   01/26/22 185 lb (83.9 kg)   01/21/22 189 lb 2.5 oz (85.8 kg)   01/05/22 181 lb 3.5 oz (82.2 kg)     There is no height or weight on file to calculate BMI.    CBC:   Lab Results   Component Value Date WBC 9.0 02/07/2022    RBC 4.21 02/07/2022    HGB 11.0 02/07/2022    HCT 34.5 02/07/2022    MCV 82.0 02/07/2022    RDW 18.0 02/07/2022     02/07/2022       CMP:   Lab Results   Component Value Date     02/07/2022    K 5.3 02/07/2022    K 4.5 01/05/2022     02/07/2022    CO2 22 02/07/2022    BUN 64 02/07/2022    CREATININE 8.5 02/07/2022    GFRAA 6 02/07/2022    GFRAA >60 11/09/2011    AGRATIO 0.8 01/05/2022    LABGLOM 5 02/07/2022    GLUCOSE 104 02/07/2022    PROT 6.6 01/05/2022    PROT 6.8 11/09/2011    CALCIUM 8.2 02/07/2022    BILITOT 0.4 01/05/2022    ALKPHOS 152 01/05/2022    AST 29 01/05/2022    ALT 15 01/05/2022       POC Tests:   No results for input(s): POCGLU, POCNA, POCK, POCCL, POCBUN, POCHEMO, POCHCT in the last 72 hours.     Coags:   Lab Results   Component Value Date    PROTIME 10.9 02/07/2022    INR 0.97 02/07/2022    APTT 36.5 02/07/2022       HCG (If Applicable): No results found for: PREGTESTUR, PREGSERUM, HCG, HCGQUANT     ABGs:   Lab Results   Component Value Date    PHART 7.309 08/30/2021    PO2ART 92.7 08/30/2021    OZS6XRF 31.9 08/30/2021    PHH3XTX 16.0 08/30/2021    BEART -9.4 08/30/2021    N0FCOEBX 97.7 08/30/2021        Type & Screen (If Applicable):  No results found for: LABABO, LABRH    Drug/Infectious Status (If Applicable):  No results found for: HIV, HEPCAB    COVID-19 Screening (If Applicable):   Lab Results   Component Value Date    COVID19 Not Detected 02/07/2022    COVID19 Not Detected 08/27/2021           Anesthesia Evaluation  Patient summary reviewed and Nursing notes reviewed  Airway: Mallampati: II  TM distance: >3 FB     Mouth opening: > = 3 FB Dental:          Pulmonary:   (+) pneumonia: resolved,  shortness of breath:                             Cardiovascular:  Exercise tolerance: poor (<4 METS),   (+) hypertension: moderate, CHF:, hyperlipidemia               ROS comment: 8/27/21 Echo:  Summary   Left ventricular systolic function is normal with ejection fraction estimated at 55-60 %. No regional wall motion abnormalities are noted. There is mild left ventricular hypertrophy. Normal left ventricular diastolic filling pressure. Moderate mitral regurgitation. Mild pulmonic regurgitation present. IVC size is dilated (>2.1 cm) but collapses > 50% with respiration   consistent with elevated RA pressure (8 mmHg). **There is a small-moderate bilateral pleural effusion. **There is a small circumferential pericardial effusion noted. Neuro/Psych:   (+) CVA (8/2021- right sided weakness):, neuromuscular disease:, headaches: migraine headaches, psychiatric history:depression/anxiety             GI/Hepatic/Renal:   (+) renal disease: ESRD and dialysis,           Endo/Other:    (+) DiabetesType II DM, , .                 Abdominal:             Vascular:   + PVD, aortic or cerebral, . ROS comment: Right radial artery occlusion. Other Findings:               Anesthesia Plan      MAC and regional     ASA 3     (Pt is blind)  Induction: intravenous. MIPS: Prophylactic antiemetics administered. Anesthetic plan and risks discussed with patient. Plan discussed with CRNA.                 Onesimo Ledesma MD   2/10/2022

## 2022-02-10 NOTE — PROGRESS NOTES
BP now 133/77 after IV hydralazine. Phase I discharge criteria met, will transfer to [facility].

## 2022-02-11 ENCOUNTER — CARE COORDINATION (OUTPATIENT)
Dept: CASE MANAGEMENT | Age: 47
End: 2022-02-11

## 2022-02-11 NOTE — CARE COORDINATION
[facility] Transitions Follow Up Call    2022    Patient: Codey Garcia  Patient : 1975   MRN: [id number]  Reason for Admission: Covid  Discharge Date: 2/10/22 RARS: Readmission Risk Score: 34.4 ( )         Spoke with: [address] [place] [address] [address] [facility] Transitions Subsequent and Final Call    Subsequent and Final Calls  Care Transitions Interventions  Other Interventions:           Care Transitions Follow Up Call    Needs to be reviewed by the provider   Additional needs identified to be addressed with provider: No  none             Method of communication with provider : none      Care Transition Nurse (CTN) contacted the patient by telephone to follow up after admission. Verified name and  with patient as identifiers. Addressed changes since last contact: new vascular access  Discussed follow-up appointments. If no appointment was previously scheduled, appointment scheduling offered: No.   Is follow up appointment scheduled within 7 days of discharge? Yes. Advance Care Planning:   Does patient have an Advance Directive: decision maker updated. CTN reviewed discharge instructions, medical action plan and red flags with patient and discussed any barriers to care and/or understanding of plan of care after discharge. Discussed appropriate site of care based on symptoms and resources available to patient including: PCP and Specialist. The patient agrees to contact the PCP office for questions related to their healthcare. Patients top risk factors for readmission: medical condition-COVID  Interventions to address risk factors: Obtained and reviewed discharge summary and/or continuity of care documents, Education of patient/family/caregiver/guardian to support self-management-symptom management, vascular access, f/u and Assessment and support for treatment adherence and medication management-oxycodone      Non-[facility] follow up appointment(s): NA    States she is pretty good.  Has some pain that is manageable r/t new vascular access. Patient was groggy. Denies s/s COVID. Appetite good. Denies problems with bowels or bladder. Was unsure about f/u today. CTN provided contact information for future needs. Plan for follow-up call in 7-10 days based on severity of symptoms and risk factors.   Plan for next call: symptom management-s/s COVID, pain r/t new vascular access  follow up appointment-vascular, nephrology  medication management-new or changed         Follow Up  Future Appointments   Date Time Provider Ely Camacho   2/17/2022  1:30 PM Viviana Sampson. [address]

## 2022-02-17 ENCOUNTER — HOSPITAL ENCOUNTER (OUTPATIENT)
Dept: PHYSICAL THERAPY | Age: 47
Setting detail: THERAPIES SERIES
Discharge: HOME | End: 2022-02-17

## 2022-02-18 ENCOUNTER — CARE COORDINATION (OUTPATIENT)
Dept: CASE MANAGEMENT | Age: 47
End: 2022-02-18

## 2022-02-18 NOTE — CARE COORDINATION
Care Transitions Outreach Attempt    Call within 2 business days of discharge: Yes   Attempted to reach patient for transitions of care follow up. Unable to reach patient. Jose Angel LPN, [facility]: [phone]      Patient: Pallavi Beach Patient : 1975 MRN: [id number]    Last Discharge [facility]       Complaint Diagnosis Description Type Department Provider    2/10/22  ESRD (end stage renal disease) on dialysis [facility]) Admission (Discharged) [address] Kristopher Jain MD            Was this an external facility discharge? No Discharge Facility: [facility]    Noted following upcoming appointments from discharge chart review:   [facility] follow up appointment(s): No future appointments.   Non-[facility] follow up appointment(s):

## 2022-02-20 ENCOUNTER — HOSPITAL ENCOUNTER (INPATIENT)
Age: 47
LOS: 4 days | Discharge: NURSING, REHAB OR LONG-TERM CARE | DRG: 776 | End: 2022-02-24
Attending: STUDENT IN AN ORGANIZED HEALTH CARE EDUCATION/TRAINING PROGRAM | Admitting: INTERNAL MEDICINE
Payer: COMMERCIAL

## 2022-02-20 ENCOUNTER — APPOINTMENT (OUTPATIENT)
Dept: CT IMAGING | Age: 47
DRG: 776 | End: 2022-02-20
Payer: COMMERCIAL

## 2022-02-20 DIAGNOSIS — Z91.14 OVERUSE OF MEDICATION: Primary | ICD-10-CM

## 2022-02-20 DIAGNOSIS — F41.1 GENERALIZED ANXIETY DISORDER WITH PANIC ATTACKS: ICD-10-CM

## 2022-02-20 DIAGNOSIS — N18.6 STAGE 5 CHRONIC KIDNEY DISEASE ON CHRONIC DIALYSIS (HCC): ICD-10-CM

## 2022-02-20 DIAGNOSIS — R53.1 GENERALIZED WEAKNESS: ICD-10-CM

## 2022-02-20 DIAGNOSIS — E11.649 HYPOGLYCEMIA DUE TO TYPE 2 DIABETES MELLITUS (HCC): ICD-10-CM

## 2022-02-20 DIAGNOSIS — Z99.2 STAGE 5 CHRONIC KIDNEY DISEASE ON CHRONIC DIALYSIS (HCC): ICD-10-CM

## 2022-02-20 DIAGNOSIS — Z79.899 CHRONIC PRESCRIPTION BENZODIAZEPINE USE: ICD-10-CM

## 2022-02-20 DIAGNOSIS — F41.0 GENERALIZED ANXIETY DISORDER WITH PANIC ATTACKS: ICD-10-CM

## 2022-02-20 PROBLEM — R41.82 ALTERED MENTAL STATUS: Status: ACTIVE | Noted: 2022-02-20

## 2022-02-20 LAB
A/G RATIO: 1.3 (ref 1.1–2.2)
ALBUMIN SERPL-MCNC: 4 G/DL (ref 3.4–5)
ALP BLD-CCNC: 233 U/L (ref 40–129)
ALT SERPL-CCNC: <5 U/L (ref 10–40)
AMPHETAMINE SCREEN, URINE: ABNORMAL
ANION GAP SERPL CALCULATED.3IONS-SCNC: 19 MMOL/L (ref 3–16)
ANISOCYTOSIS: ABNORMAL
AST SERPL-CCNC: 26 U/L (ref 15–37)
BARBITURATE SCREEN URINE: ABNORMAL
BASOPHILS ABSOLUTE: 0 K/UL (ref 0–0.2)
BASOPHILS RELATIVE PERCENT: 0 %
BENZODIAZEPINE SCREEN, URINE: POSITIVE
BILIRUB SERPL-MCNC: 0.3 MG/DL (ref 0–1)
BILIRUBIN URINE: NEGATIVE
BLOOD, URINE: ABNORMAL
BUN BLDV-MCNC: 78 MG/DL (ref 7–20)
CALCIUM SERPL-MCNC: 8.1 MG/DL (ref 8.3–10.6)
CANNABINOID SCREEN URINE: ABNORMAL
CHLORIDE BLD-SCNC: 101 MMOL/L (ref 99–110)
CLARITY: CLEAR
CO2: 22 MMOL/L (ref 21–32)
COCAINE METABOLITE SCREEN URINE: ABNORMAL
COLOR: YELLOW
CREAT SERPL-MCNC: 10.6 MG/DL (ref 0.6–1.1)
EOSINOPHILS ABSOLUTE: 0.3 K/UL (ref 0–0.6)
EOSINOPHILS RELATIVE PERCENT: 3 %
EPITHELIAL CELLS, UA: 1 /HPF (ref 0–5)
ETHANOL: NORMAL MG/DL (ref 0–0.08)
ETHANOL: NORMAL MG/DL (ref 0–0.08)
GFR AFRICAN AMERICAN: 5
GFR NON-AFRICAN AMERICAN: 4
GLUCOSE BLD-MCNC: 100 MG/DL (ref 70–99)
GLUCOSE BLD-MCNC: 60 MG/DL (ref 70–99)
GLUCOSE BLD-MCNC: 67 MG/DL (ref 70–99)
GLUCOSE URINE: NEGATIVE MG/DL
HCG QUALITATIVE: NEGATIVE
HCT VFR BLD CALC: 32.9 % (ref 36–48)
HEMOGLOBIN: 10.6 G/DL (ref 12–16)
HYALINE CASTS: 0 /LPF (ref 0–8)
KETONES, URINE: NEGATIVE MG/DL
LEUKOCYTE ESTERASE, URINE: NEGATIVE
LYMPHOCYTES ABSOLUTE: 1.5 K/UL (ref 1–5.1)
LYMPHOCYTES RELATIVE PERCENT: 15 %
Lab: ABNORMAL
MCH RBC QN AUTO: 26.8 PG (ref 26–34)
MCHC RBC AUTO-ENTMCNC: 32.1 G/DL (ref 31–36)
MCV RBC AUTO: 83.4 FL (ref 80–100)
METHADONE SCREEN, URINE: ABNORMAL
MICROSCOPIC EXAMINATION: YES
MONOCYTES ABSOLUTE: 0.8 K/UL (ref 0–1.3)
MONOCYTES RELATIVE PERCENT: 8 %
NEUTROPHILS ABSOLUTE: 7.2 K/UL (ref 1.7–7.7)
NEUTROPHILS RELATIVE PERCENT: 74 %
NITRITE, URINE: NEGATIVE
OPIATE SCREEN URINE: ABNORMAL
OVALOCYTES: ABNORMAL
OXYCODONE URINE: POSITIVE
PDW BLD-RTO: 20.4 % (ref 12.4–15.4)
PERFORMED ON: ABNORMAL
PERFORMED ON: ABNORMAL
PH UA: 6.5
PH UA: 6.5 (ref 5–8)
PHENCYCLIDINE SCREEN URINE: ABNORMAL
PLATELET # BLD: 182 K/UL (ref 135–450)
PLATELET SLIDE REVIEW: ADEQUATE
PMV BLD AUTO: 8.3 FL (ref 5–10.5)
POTASSIUM REFLEX MAGNESIUM: 4.9 MMOL/L (ref 3.5–5.1)
PROPOXYPHENE SCREEN: ABNORMAL
PROTEIN UA: >=300 MG/DL
RBC # BLD: 3.94 M/UL (ref 4–5.2)
RBC UA: 2 /HPF (ref 0–4)
SLIDE REVIEW: ABNORMAL
SODIUM BLD-SCNC: 142 MMOL/L (ref 136–145)
SPECIFIC GRAVITY UA: 1.01 (ref 1–1.03)
TOTAL PROTEIN: 7.2 G/DL (ref 6.4–8.2)
TROPONIN: 0.25 NG/ML
URINE REFLEX TO CULTURE: ABNORMAL
URINE TYPE: ABNORMAL
UROBILINOGEN, URINE: 0.2 E.U./DL
WBC # BLD: 9.7 K/UL (ref 4–11)
WBC UA: 4 /HPF (ref 0–5)

## 2022-02-20 PROCEDURE — 81001 URINALYSIS AUTO W/SCOPE: CPT

## 2022-02-20 PROCEDURE — 84484 ASSAY OF TROPONIN QUANT: CPT

## 2022-02-20 PROCEDURE — 82077 ASSAY SPEC XCP UR&BREATH IA: CPT

## 2022-02-20 PROCEDURE — 96376 TX/PRO/DX INJ SAME DRUG ADON: CPT

## 2022-02-20 PROCEDURE — 2500000003 HC RX 250 WO HCPCS: Performed by: NURSE PRACTITIONER

## 2022-02-20 PROCEDURE — 6370000000 HC RX 637 (ALT 250 FOR IP): Performed by: NURSE PRACTITIONER

## 2022-02-20 PROCEDURE — 99285 EMERGENCY DEPT VISIT HI MDM: CPT

## 2022-02-20 PROCEDURE — 2500000003 HC RX 250 WO HCPCS: Performed by: STUDENT IN AN ORGANIZED HEALTH CARE EDUCATION/TRAINING PROGRAM

## 2022-02-20 PROCEDURE — 6360000004 HC RX CONTRAST MEDICATION: Performed by: NURSE PRACTITIONER

## 2022-02-20 PROCEDURE — 96375 TX/PRO/DX INJ NEW DRUG ADDON: CPT

## 2022-02-20 PROCEDURE — 96374 THER/PROPH/DIAG INJ IV PUSH: CPT

## 2022-02-20 PROCEDURE — 6360000002 HC RX W HCPCS: Performed by: NURSE PRACTITIONER

## 2022-02-20 PROCEDURE — 70450 CT HEAD/BRAIN W/O DYE: CPT

## 2022-02-20 PROCEDURE — 80307 DRUG TEST PRSMV CHEM ANLYZR: CPT

## 2022-02-20 PROCEDURE — 70496 CT ANGIOGRAPHY HEAD: CPT

## 2022-02-20 PROCEDURE — 6360000002 HC RX W HCPCS

## 2022-02-20 PROCEDURE — 93005 ELECTROCARDIOGRAM TRACING: CPT | Performed by: NURSE PRACTITIONER

## 2022-02-20 PROCEDURE — 36415 COLL VENOUS BLD VENIPUNCTURE: CPT

## 2022-02-20 PROCEDURE — 1200000000 HC SEMI PRIVATE

## 2022-02-20 PROCEDURE — 2580000003 HC RX 258: Performed by: NURSE PRACTITIONER

## 2022-02-20 PROCEDURE — 84703 CHORIONIC GONADOTROPIN ASSAY: CPT

## 2022-02-20 PROCEDURE — 85025 COMPLETE CBC W/AUTO DIFF WBC: CPT

## 2022-02-20 PROCEDURE — 80053 COMPREHEN METABOLIC PANEL: CPT

## 2022-02-20 RX ORDER — 0.9 % SODIUM CHLORIDE 0.9 %
500 INTRAVENOUS SOLUTION INTRAVENOUS ONCE
Status: COMPLETED | OUTPATIENT
Start: 2022-02-20 | End: 2022-02-20

## 2022-02-20 RX ORDER — PROPRANOLOL HYDROCHLORIDE 80 MG/1
80 CAPSULE, EXTENDED RELEASE ORAL DAILY
Status: DISCONTINUED | OUTPATIENT
Start: 2022-02-20 | End: 2022-02-20 | Stop reason: SDUPTHER

## 2022-02-20 RX ORDER — LISINOPRIL 10 MG/1
10 TABLET ORAL ONCE
Status: COMPLETED | OUTPATIENT
Start: 2022-02-20 | End: 2022-02-20

## 2022-02-20 RX ORDER — HYDROXYZINE PAMOATE 25 MG/1
25 CAPSULE ORAL 3 TIMES DAILY PRN
Status: DISCONTINUED | OUTPATIENT
Start: 2022-02-20 | End: 2022-02-20

## 2022-02-20 RX ORDER — PROPRANOLOL HYDROCHLORIDE 80 MG/1
80 CAPSULE, EXTENDED RELEASE ORAL ONCE
Status: DISCONTINUED | OUTPATIENT
Start: 2022-02-20 | End: 2022-02-24 | Stop reason: HOSPADM

## 2022-02-20 RX ORDER — LABETALOL HYDROCHLORIDE 5 MG/ML
10 INJECTION, SOLUTION INTRAVENOUS ONCE
Status: COMPLETED | OUTPATIENT
Start: 2022-02-20 | End: 2022-02-20

## 2022-02-20 RX ORDER — NALOXONE HYDROCHLORIDE 1 MG/ML
INJECTION INTRAMUSCULAR; INTRAVENOUS; SUBCUTANEOUS
Status: COMPLETED
Start: 2022-02-20 | End: 2022-02-20

## 2022-02-20 RX ORDER — NALOXONE HYDROCHLORIDE 1 MG/ML
1 INJECTION INTRAMUSCULAR; INTRAVENOUS; SUBCUTANEOUS ONCE
Status: DISCONTINUED | OUTPATIENT
Start: 2022-02-20 | End: 2022-02-24 | Stop reason: HOSPADM

## 2022-02-20 RX ORDER — DEXTROSE MONOHYDRATE 25 G/50ML
25 INJECTION, SOLUTION INTRAVENOUS PRN
Status: DISCONTINUED | OUTPATIENT
Start: 2022-02-20 | End: 2022-02-23

## 2022-02-20 RX ORDER — NALOXONE HYDROCHLORIDE 1 MG/ML
1 INJECTION INTRAMUSCULAR; INTRAVENOUS; SUBCUTANEOUS ONCE
Status: COMPLETED | OUTPATIENT
Start: 2022-02-20 | End: 2022-02-20

## 2022-02-20 RX ORDER — HYDRALAZINE HYDROCHLORIDE 20 MG/ML
10 INJECTION INTRAMUSCULAR; INTRAVENOUS ONCE
Status: DISCONTINUED | OUTPATIENT
Start: 2022-02-20 | End: 2022-02-20

## 2022-02-20 RX ORDER — IBUPROFEN 600 MG/1
1 TABLET ORAL PRN
Qty: 1 KIT | Refills: 1 | Status: SHIPPED | OUTPATIENT
Start: 2022-02-20 | End: 2022-05-26

## 2022-02-20 RX ADMIN — DEXTROSE MONOHYDRATE 25 G: 25 INJECTION, SOLUTION INTRAVENOUS at 13:20

## 2022-02-20 RX ADMIN — SODIUM CHLORIDE 500 ML: 9 INJECTION, SOLUTION INTRAVENOUS at 14:41

## 2022-02-20 RX ADMIN — LABETALOL HYDROCHLORIDE 10 MG: 5 INJECTION INTRAVENOUS at 19:54

## 2022-02-20 RX ADMIN — NALOXONE HYDROCHLORIDE 1 MG: 1 INJECTION PARENTERAL at 13:20

## 2022-02-20 RX ADMIN — IOPAMIDOL 75 ML: 755 INJECTION, SOLUTION INTRAVENOUS at 18:54

## 2022-02-20 RX ADMIN — LISINOPRIL 10 MG: 10 TABLET ORAL at 17:25

## 2022-02-20 RX ADMIN — NALOXONE HYDROCHLORIDE 1 MG: 1 INJECTION PARENTERAL at 14:12

## 2022-02-20 ASSESSMENT — ENCOUNTER SYMPTOMS
SORE THROAT: 0
VOMITING: 0
COUGH: 0
ABDOMINAL PAIN: 0
NAUSEA: 0
BACK PAIN: 0
DIARRHEA: 0
SHORTNESS OF BREATH: 0
COLOR CHANGE: 0
WHEEZING: 0

## 2022-02-20 ASSESSMENT — PAIN - FUNCTIONAL ASSESSMENT: PAIN_FUNCTIONAL_ASSESSMENT: 0-10

## 2022-02-20 ASSESSMENT — PAIN DESCRIPTION - LOCATION: LOCATION: ABDOMEN

## 2022-02-20 ASSESSMENT — PAIN SCALES - GENERAL: PAINLEVEL_OUTOF10: 5

## 2022-02-20 NOTE — ED TRIAGE NOTES
Pt unable to sit up at the edge of the bed, appears lethargic  keeps nodding off,  denies taking any medications

## 2022-02-20 NOTE — ED PROVIDER NOTES
**ADVANCED PRACTICE PROVIDER, I HAVE EVALUATED THIS PATIENT**        [facility]A NURSING  EMERGENCY DEPARTMENT ENCOUNTER      Pt Name: Celina Elena  UDR:[id number]  Davidgfkelsie 1975  Date of evaluation: 2/20/2022  Provider: RIVERA Guaman CNP      Chief Complaint:    Chief Complaint   Patient presents with    Fatigue     arrived via ems from home started  feeling weak at 4 am with progression went to breakfast this am not sure of the time she returned home,  previous stroke 8/21 b/s 67, stated  kicked her in the left leg lateral          Nursing Notes, Past Medical Hx, Past Surgical Hx, Social Hx, Allergies, and Family Hx were all reviewed and agreed with or any disagreements were addressed in the HPI.    HPI: (Location, Duration, Timing, Severity, Quality, Assoc Sx, Context, Modifying factors)    Chief Complaint of fatigue and feeling tired. This is a  55 y.o. female who presents today with fatigue, patient is extremley sleepy, she states that her blood sugar has been running low, she started to feel weak around 4am. Supposedly she had a stroke in August of 2021, but she has numerous bruising to her bilateral legs, she states she has been falling a lot. She is denying and alcohol or drug use. However, she does admit to taking her benzodiazepine but she is on Xanax. It patient remembers having breakfast this morning and has been tired ever since. She states she is not been eating very much however she still giving herself insulin. She denies any nausea vomiting or diarrhea. No cough, congestion, fever or chills. No chest pain, chest pain or shortness of breath. She states she just feels tired, no additional complaints, no additional aggravating relieving factors. The patient presents awake, alert however sleepy.     PastMedical/Surgical History:      Diagnosis Date    Blind in both eyes     Cerebral artery occlusion with [facility] infarction ([facility]     [facility]estive [facility] failure) [facility]     Chronic kidney disease     Depression     Diabetes mellitus out of control ([facility]     Diabetes mellitus, type II ([facility]     2005    Diabetic neuropathy associated with type 2 diabetes mellitus ([facility]     Generalized headaches     Hypertension     Infertility     Insomnia     chronic vs lack of time spent to sleep    Migraine headache 11/09/2011    Mixed hyperlipidemia     Otitis media     h/o recurrent    Pelvic abscess in female 10/05/2013    Pneumonia     2004 approx.     [facility] 08/27/[facility]    Stroke ([facility] 08/27/2021         Procedure Laterality Date    CERVIX SURGERY      laser tx for dysplasia;1992    DIALYSIS FISTULA CREATION Right 2/10/2022    RIGHT BRACHIO CEPHALIC FISTULA CREATION performed by Eda Bland MD at [address] [place] [address] Right     FOOT SURGERY Bilateral     FOOT SURGERY Left     IR TUNNELED CATHETER PLACEMENT GREATER THAN 5 YEARS  9/7/2021    IR TUNNELED CATHETER PLACEMENT GREATER THAN 5 YEARS 9/7/2021 Livan Herrera MD [facility] SPECIAL PROCEDURES       Medications:  Current Discharge Medication List      CONTINUE these medications which have NOT CHANGED    Details   lisinopril (PRINIVIL;ZESTRIL) 10 MG tablet Take 1 tablet by mouth daily  Qty: 30 tablet, Refills: 3      buPROPion (WELLBUTRIN XL) 150 MG extended release tablet Take 1 tablet by mouth every morning  Qty: 30 tablet, Refills: 1    Associated Diagnoses: Generalized anxiety disorder with panic attacks      propranolol (INDERAL LA) 80 MG extended release capsule TAKE ONE CAPSULE BY MOUTH EVERY MORNING  Qty: 30 capsule, Refills: 0      hydrOXYzine (ATARAX) 25 MG tablet       insulin glargine (LANTUS SOLOSTAR) 100 UNIT/ML injection pen Inject 10 Units into the skin every morning       atorvastatin (LIPITOR) 40 MG tablet Take 1 tablet by mouth nightly  Qty: 30 tablet, Refills: 3    Associated Diagnoses: Mixed hyperlipidemia      aspirin 81 MG EC tablet Take 1 tablet by mouth daily  Qty: 30 tablet, Refills: 3      ALPRAZolam (XANAX XR) 3 MG extended release tablet Take 1 tablet by mouth every morning for 3 days. Qty: 3 tablet, Refills: 0    Associated Diagnoses: Generalized anxiety disorder with panic attacks      pregabalin (LYRICA) 75 MG capsule TAKE ONE CAPSULE BY MOUTH EVERY NIGHT  Qty: 30 capsule, Refills: 0    Associated Diagnoses: Other diabetic neurological complication associated with type 2 diabetes mellitus (HCC)      Heat Wraps (THERMACARE BACK/HIP) MISC 1 each by Does not apply route daily as needed (back pain)  Qty: 3 each, Refills: 5    Associated Diagnoses: Chronic midline thoracic back pain      methyl salicylate-menthol (RANDI LEBLANC GREASELESS) 10-15 % CREA Apply topically 3 times daily as needed for Pain  Qty: 57 g, Refills: 0    Associated Diagnoses: Chronic midline thoracic back pain      benzonatate (TESSALON) 200 MG capsule Take 1 capsule by mouth every 8 hours as needed for Cough  Qty: 15 capsule, Refills: 1    Associated Diagnoses: Epiglottic lesion      albuterol sulfate  (90 Base) MCG/ACT inhaler Inhale 2 puffs into the lungs every 6 hours as needed for Wheezing or Shortness of Breath  Qty: 18 g, Refills: 1    Associated Diagnoses: Chronic obstructive pulmonary disease, unspecified COPD type ([facility])      fluvoxaMINE (LUVOX) 100 MG tablet Take 1 tablet by mouth nightly  Qty: 30 tablet, Refills: 1    Comments: Stop prozac  Associated Diagnoses: Generalized anxiety disorder with panic attacks; Obsessive-compulsive disorder, unspecified type      Dulaglutide 1.5 MG/0.5ML SOPN Inject 1.5 mg into the skin once a week  Qty: 4 pen, Refills: 1    Comments: Dose adjustment  Associated Diagnoses: Type 2 diabetes mellitus with other specified complication, with long-term current use of insulin ([facility])      Misc.  Devices (PULSE OXIMETER) MISC 1 each by Does not apply route every 6-8 hours as needed (shortness of breath)  Qty: 1 each, Refills: 0    Associated Diagnoses: Chronic obstructive pulmonary disease, unspecified COPD type ([facility])      Nasal Dilators (BREATHE RIGHT EXTRA STRENGTH) STRP 1 strip by Does not apply route nightly as needed (nasal congestion)  Qty: 8 strip, Refills: 2    Associated Diagnoses: End-stage renal disease on hemodialysis ([facility])      glycopyrrolate-formoterol (BEVESPI AEROSPHERE) 9-4.8 MCG/ACT AERO Inhale 2 puffs into the lungs 2 times daily  Qty: 10.7 g, Refills: 2    Associated Diagnoses: Chronic obstructive pulmonary disease, unspecified COPD type ([facility])      glucose (GLUTOSE) 40 % GEL Take 37.5 mLs by mouth as needed (low blood sugar)  Qty: 45 g, Refills: 1      Insulin Pen Needle 29G X 12.7MM MISC 1 each by Does not apply route daily  Qty: 100 each, Refills: 5    Associated Diagnoses: Type 2 diabetes mellitus with other specified complication, with long-term current use of insulin ([facility])      insulin lispro, 1 Unit Dial, 100 UNIT/ML SOPN Inject 0-6 Units into the skin 3 times daily (with meals) **Corrective Low Dose Algorithm**  Glucose: Dose:               No Insulin  140-199 1 Unit  200-249 2 Units  250-299 3 Units  300-349 4 Units  350-399 5 Units  Over 399 6 Units  Qty: 3 pen, Refills: 0               Review of Systems:  (2-9 systems needed)  Review of Systems   Constitutional: Positive for fatigue. Negative for chills and fever. She states she feels fatigued, rundown and little energy but denies any cough, congestion, fever or chills   HENT: Negative for congestion and sore throat. Respiratory: Negative for cough, shortness of breath and wheezing. Cardiovascular: Negative for chest pain. Gastrointestinal: Negative for abdominal pain, diarrhea, nausea and vomiting. Genitourinary: Negative for difficulty urinating, dysuria, frequency and hematuria. Musculoskeletal: Negative for back pain. Skin: Negative for color change. Neurological: Negative for weakness, numbness and headaches.         Presents via EMS for fatigue, patient is extremley sleepy, she states that her blood sugar has been running low, she started to feel weak around 4am. Supposedly she had a stroke in August of 2021, but she has numerous bruising to her bilateral legs, she states she has been falling a lot. She is denying and alcohol or drug use. Psychiatric/Behavioral: Positive for behavioral problems. She states she feels fatigued, weak and does not have much energy however, denies any homicidal or suicidal ideations. She also states that she has been taking her Xanax. \"Positives and Pertinent negatives as per HPI\"    Physical Exam:  Physical Exam  Vitals and nursing note reviewed. Constitutional:       Appearance: She is well-developed. She is not diaphoretic. HENT:      Head: Normocephalic. Right Ear: External ear normal.      Left Ear: External ear normal.   Eyes:      General: No scleral icterus. Right eye: No discharge. Left eye: No discharge. Cardiovascular:      Rate and Rhythm: Normal rate. Comments: Normal S1 and two, peripheral pulses 2+, no edema observed  Pulmonary:      Effort: Pulmonary effort is normal. No respiratory distress. Comments: Lungs are clear anteriorly, diminished posteriorly in the bases, she is not tachypneic or dyspneic and saturations are 98% on room air  Musculoskeletal:         General: Normal range of motion. Cervical back: Normal range of motion and neck supple. Skin:     General: Skin is warm. Capillary Refill: Capillary refill takes less than 2 seconds. Coloration: Skin is not pale. Comments: Patient has multiple bruises on her lower legs specifically around her thighs and knees, she states that she has fallen. Neurological:      General: No focal deficit present. Mental Status: She is alert and oriented to person, place, and time. GCS: GCS eye subscore is 4. GCS verbal subscore is 5. GCS motor subscore is 6.       Cranial Nerves: Cranial nerves are intact. Sensory: Sensation is intact. Motor: Motor function is intact. Comments: Although patient appears very lethargic and tired, she is able to tell me her name, date of birth, where she is at. She admits to giving herself insulin even though her blood sugar was in the 60s. She is active range of motion of her arms and legs, she has no facial asymmetry. Tongue is midline. No one-sided weakness or neglect. Psychiatric:         Behavior: Behavior normal.      Comments: Patient admits to taking Xanax because she is stressed however she does consent for safety denying homicidal or suicidal ideations. She lives with her .          MEDICAL DECISION MAKING    Vitals:    Vitals:    02/21/22 1525 02/21/22 1824 02/21/22 1928 02/21/22 1954   BP: (!) 167/82   (!) 179/88   Pulse: 105 104  102   Resp: 17 17 14   Temp: 97.6 °F (36.4 °C)   99.9 °F (37.7 °C)   TempSrc: Oral   Axillary   SpO2: 93%  90% 91%   Weight:       Height:           LABS:  Labs Reviewed   CBC WITH AUTO DIFFERENTIAL - Abnormal; Notable for the following components:       Result Value    RBC 3.94 (*)     Hemoglobin 10.6 (*)     Hematocrit 32.9 (*)     RDW 20.4 (*)     Anisocytosis Occasional (*)     Ovalocytes Occasional (*)     All other components within normal limits    Narrative:     Performed at:  [facility] [address], [place], [address]   Phone [phone]   COMPREHENSIVE METABOLIC PANEL W/ REFLEX TO MG FOR LOW K - Abnormal; Notable for the following components:    Anion Gap 19 (*)     Glucose 67 (*)     BUN 78 (*)     CREATININE 10.6 (*)     GFR Non- 4 (*)     GFR  5 (*)     Calcium 8.1 (*)     Alkaline Phosphatase 233 (*)     ALT <5 (*)     All other components within normal limits    Narrative:     Nadir VALENTIN tel. [phone],  Chemistry results called to and read back by marian lawson, 02/20/2022  13:52, by [facility]  Performed at:  [facility] [address] [facility], [address] [employer]   Phone [phone]   TROPONIN - Abnormal; Notable for the following components:    Troponin 0.25 (*)     All other components within normal limits    Narrative:     Ramana Soriano  SFERF tel. [phone],  Chemistry results called to and read back by marian lawson, 02/20/2022  13:52, by [facility]  Performed at:  [facility] [address], [address] [employer] [address] [place]   Phone [phone]   URINALYSIS WITH REFLEX TO CULTURE - Abnormal; Notable for the following components:    Blood, Urine SMALL (*)     Protein, UA >=300 (*)     All other components within normal limits    Narrative:     Performed at:  [facility] [address],  [place], [address] [employer] [address]   Phone [phone]   [address] - Abnormal; Notable for the following components:    Benzodiazepine Screen, Urine POSITIVE (*)     Oxycodone Urine POSITIVE (*)     All other components within normal limits    Narrative:     Performed at:  [facility] [address],  [place], [address] [employer] [address]   Phone [phone]   CBC - Abnormal; Notable for the following components:    Hemoglobin 11.0 (*)     Hematocrit 34.6 (*)     RDW 20.5 (*)     All other components within normal limits    Narrative:     Performed at:  [facility] [address],  [place], [address] [employer] [address] [employer]   Phone [phone]   BASIC METABOLIC PANEL - Abnormal; Notable for the following components:    Potassium 5.5 (*)     CO2 18 (*)     Anion Gap 23 (*)     BUN 82 (*)     CREATININE 10.6 (*)     GFR Non- 4 (*)     GFR  5 (*)     All other components within normal limits    Narrative:     Ramana Soriano  SF3A tel. [phone],  Chemistry results called to and read back by Malgorzata Fernandez, 02/21/2022  09:52, by [facility] Olive[facility]  Performed at:  [facility]  [address] [address] [place]   Phone [phone]   POCT GLUCOSE - Abnormal; Notable for the following components:    POC Glucose 60 (*)     All other components within normal limits    Narrative:     Performed at:  [facility] [address]  [place], [facility] [place]   Phone [phone]   POCT GLUCOSE - Abnormal; Notable for the following components:    POC Glucose 100 (*)     All other components within normal limits    Narrative:     Performed at:  [facility] [address], [facility] [place]   Phone [phone]   POCT GLUCOSE - Abnormal; Notable for the following components:    POC Glucose 69 (*)     All other components within normal limits    Narrative:     Performed at:  [facility] [address] [facility] [address]  [place], [facility] [place]   Phone [phone]   POCT GLUCOSE - Abnormal; Notable for the following components:    POC Glucose 192 (*)     All other components within normal limits    Narrative:     Performed at:  [facility] [address], [place], [facility] [place]   Phone [phone]   POCT GLUCOSE - Abnormal; Notable for the following components:    POC Glucose 129 (*)     All other components within normal limits    Narrative:     Performed at:  [facility] [address] [facility]  [place], [facility] [place]   Phone [phone]   POCT GLUCOSE - Abnormal; Notable for the following components:    POC Glucose 116 (*)     All other components within normal limits    Narrative:     Performed at:  [facility] [address],  [place], [facility] [place]   Phone [phone]   HCG, SERUM, QUALITATIVE    Narrative:     Performed at:  [facility] [address],  [place], [facility] [place]   Phone [phone]   ETHANOL    Narrative:     Virlinda Never. [phone],  Chemistry results called to and read back by marian lawson, 02/20/2022  13:52, by [facility]  Performed at:  [facility]  [address] [facility],  [place], [address]   Phone [phone]   MICROSCOPIC URINALYSIS    Narrative:     Performed at:  [facility]  [address] [facility],  [place], [address]   Phone [phone]   ETHANOL    Narrative:     Performed at:  [facility]  [address] [facility],  [place], [address]   Phone [phone]   CBC   RENAL FUNCTION PANEL   POCT GLUCOSE    Narrative:     Performed at:  [facility] [address],  [place], [address] [employer]   Phone [phone]   POCT GLUCOSE   POCT GLUCOSE   POCT GLUCOSE   POCT GLUCOSE   POCT GLUCOSE        Remainder of labs reviewed and were negative at this time or not returned at the time of this note. RADIOLOGY:   Non-plain film images such as CT, Ultrasound and MRI are read by the radiologist. Esther JULIO, RIVERA - CNP have directly visualized the radiologic plain film image(s) with the below findings:      Interpretation per the Radiologist below, if available at the time of this note:     [facility]   Final Result   1. Age related involutional changes with no acute intracranial abnormality. 2. No flow-limiting arterial stenosis in the head and neck. CT HEAD WO CONTRAST   Final Result   1. Age related involutional changes with no acute intracranial abnormality. 2. No flow-limiting arterial stenosis in the head and neck.               MEDICAL DECISION MAKING / ED COURSE:    Because of high probability of sudden clinical deterioration of the patient's condition and risk of further deterioration, critical care time required my full attention to the patient's condition; which included chart data review, documentation, medication ordering, reviewing the patient's old records, reevaluation patient's cardiac, pulmonary and neurological status. Reevaluation of vital signs. Consultations with ED attending and admitting physician. Ordering, interpreting reviewing diagnostic testing. Therefore a critical care time was 35 minutes of direct attention to the patient's condition did not include time spent on procedures.     PROCEDURES:   Procedures    None    Patient was given:  Medications   dextrose 50 % IV solution (25 g IntraVENous Given 2/20/22 1320)   naloxone (NARCAN) injection 1 mg ( IntraVENous Canceled Entry 2/20/22 1845)   propranolol (INDERAL LA) extended release capsule 80 mg (80 mg Oral Not Given 2/20/22 1846)   aspirin EC tablet 81 mg (81 mg Oral Given 2/21/22 0821)   atorvastatin (LIPITOR) tablet 40 mg (40 mg Oral Given 2/21/22 2007)   buPROPion (WELLBUTRIN XL) extended release tablet 150 mg (150 mg Oral Given 2/21/22 0821)   lisinopril (PRINIVIL;ZESTRIL) tablet 10 mg (10 mg Oral Given 2/21/22 0821)   propranolol (INDERAL LA) extended release capsule 80 mg (80 mg Oral Given 2/21/22 0834)   albuterol sulfate  (90 Base) MCG/ACT inhaler 2 puff (2 puffs Inhalation Given 2/21/22 1928)   albuterol sulfate  (90 Base) MCG/ACT inhaler 2 puff (has no administration in time range)   glucose (GLUTOSE) 40 % oral gel 15 g (has no administration in time range)   dextrose 50 % IV solution (has no administration in time range)   glucagon (rDNA) injection 1 mg (has no administration in time range)   dextrose 5 % solution (has no administration in time range)   insulin lispro (1 Unit Dial) 0-6 Units (0 Units SubCUTAneous Not Given 2/21/22 1609)   insulin lispro (1 Unit Dial) 0-3 Units (0 Units SubCUTAneous Not Given 2/21/22 2009)   heparin (porcine) injection 5,000 Units (5,000 Units SubCUTAneous Given 2/21/22 2006)   heparin (porcine) injection 3,600 Units (3,600 Units IntraCATHeter Given 2/21/22 1449)   naloxone (NARCAN) injection 1 mg (1 mg IntraVENous Given 2/20/22 1412)   0.9 % sodium chloride bolus (0 mLs IntraVENous Stopped 2/20/22 1651)   lisinopril (PRINIVIL;ZESTRIL) tablet 10 mg (10 mg Oral Given 2/20/22 1725)   iopamidol (ISOVUE-370) 76 % injection 75 mL (75 mLs IntraVENous Given 2/20/22 1854)   labetalol (NORMODYNE;TRANDATE) injection 10 mg (10 mg IntraVENous Given 2/20/22 1954)   albumin human 25 % IV solution 25 g (0 g IntraVENous Stopped 2/21/22 1400)       Presents via EMS for fatigue, patient is extremley sleepy, she states that her blood sugar has been running low, she started to feel weak around 4am. Supposedly she had a stroke in August of 2021, but she has numerous bruising to her bilateral legs, she states she has been falling a lot. She is denying and alcohol or drug use. After evaluation and examination the patient I do believe she is hypoglycemic as the patient has not been eating normal, she states her blood sugar was in the 60s at home today and she still gave herself insulin. Patient's blood sugar today was sixty, I ordered her an amp of D50. Urinalysis shows proteinuria which is chronic because she is on end-stage renal disease hemodialysis supposed to go to dialysis tomorrow. She admits to using benzodiazepines, Xanax however, her drug screen is positive for oxycodone's, we tried giving her Narcan to wake her up more, she is denying any use of this medication. CBC shows no acute sepsis or anemia. Metabolic panel shows no electrolyte disturbances or renal insufficiency. Metabolic panel is consistent with her end-stage renal disease with a BUN of seventy-eight, creatinine 10.6 and GFR of four, she does have a gap of nineteen however she had a gap higher than this before, I did give her a 500 mL bolus. On reevaluation she does report feeling better however she still quite sleepy, her  is post to come to the ER but I do believe that this is a chronic issue with her, there are concerned that she is overmedicated with the medicine that she takes at home.   However, with the nursing staff was talking to the patient and getting discharge vitals she becomes more hypertensive. The bed is concerned that she did not take her hypertensive medicine today. I then ordered hydralazine and her home blood pressure medicine. However, when the nursing staff went to give the patient her oral medication she is now becoming more sleepy and I am not exactly sure why. I went to talk to the patient again myself for the third time she is again more hypertensive, she appears more confused than when she was when she originally came in, I then ordered a CT scan of the head and CTA of the head and neck. I am waiting for her  to arrive to have a conversation of what is going on. Right before the patient was post to go to CT scan evidently she had a bowel movement in the bed did not tell anybody that she needed to use the restroom, I do believe that some of this could be potentially behavior however, I also am concerned about overmedication, but because of the elevation in blood pressure still, I am pending the CT scan. However, due to the end of my shift I gave face-to-face report to the attending physician Dr. Eladio Nyhan who will assume full care of patient secondary to the end of my shift. Please see his documentation for further disposition and plan of care. The patient CT scan of the head and CTA head and neck is still pending. The patient tolerated their visit well. I evaluated the patient. The physician was available for consultation as needed. The patient and / or the family were informed of the results of any tests, a time was given to answer questions, a plan was proposed and they agreed with plan. CLINICAL IMPRESSION:  1. Overuse of medication    2. Chronic prescription benzodiazepine use    3. Generalized weakness    4. Stage 5 chronic kidney disease on chronic dialysis ([facility]    5.  Hypoglycemia due to type 2 diabetes mellitus ([place] [facility]        DISPOSITION Admitted 02/20/2022 09:36:42 PM      PATIENT REFERRED TO:  Radha Mary  [address]  [address]  [phone]    Schedule an appointment as soon as possible for a visit in 1 day  Call your family doctor tomorrow make an appointment to be seen    [facility] Emergency Department  [address] [place] [address]  [phone]  Go to   If symptoms worsen      DISCHARGE MEDICATIONS:  Current Discharge Medication List      START taking these medications    Details   Glucagon, rDNA, (GLUCAGON EMERGENCY) 1 MG KIT Inject 1 Units as directed as needed (if blood sugar is less than 60 and you are symptomatic)  Qty: 1 kit, Refills: 1             DISCONTINUED MEDICATIONS:  Current Discharge Medication List                 (Please note the MDM and HPI sections of this note were completed with a voice recognition program.  Efforts were made to edit the dictations but occasionally words are mis-transcribed.)    Electronically signed, RVIERA Mcmanus CNP,          RIVERA Mcmanus CNP  02/21/22 2031

## 2022-02-21 ENCOUNTER — TELEPHONE (OUTPATIENT)
Dept: OTHER | Facility: CLINIC | Age: 47
End: 2022-02-21

## 2022-02-21 LAB
ANION GAP SERPL CALCULATED.3IONS-SCNC: 23 MMOL/L (ref 3–16)
BUN BLDV-MCNC: 82 MG/DL (ref 7–20)
CALCIUM SERPL-MCNC: 8.4 MG/DL (ref 8.3–10.6)
CHLORIDE BLD-SCNC: 101 MMOL/L (ref 99–110)
CO2: 18 MMOL/L (ref 21–32)
CREAT SERPL-MCNC: 10.6 MG/DL (ref 0.6–1.1)
EKG ATRIAL RATE: 86 BPM
EKG DIAGNOSIS: NORMAL
EKG P AXIS: 36 DEGREES
EKG P-R INTERVAL: 158 MS
EKG Q-T INTERVAL: 406 MS
EKG QRS DURATION: 90 MS
EKG QTC CALCULATION (BAZETT): 485 MS
EKG R AXIS: 35 DEGREES
EKG T AXIS: 42 DEGREES
EKG VENTRICULAR RATE: 86 BPM
GFR AFRICAN AMERICAN: 5
GFR NON-AFRICAN AMERICAN: 4
GLUCOSE BLD-MCNC: 116 MG/DL (ref 70–99)
GLUCOSE BLD-MCNC: 129 MG/DL (ref 70–99)
GLUCOSE BLD-MCNC: 192 MG/DL (ref 70–99)
GLUCOSE BLD-MCNC: 69 MG/DL (ref 70–99)
GLUCOSE BLD-MCNC: 77 MG/DL (ref 70–99)
GLUCOSE BLD-MCNC: 88 MG/DL (ref 70–99)
HCT VFR BLD CALC: 34.6 % (ref 36–48)
HEMOGLOBIN: 11 G/DL (ref 12–16)
MCH RBC QN AUTO: 26.4 PG (ref 26–34)
MCHC RBC AUTO-ENTMCNC: 31.7 G/DL (ref 31–36)
MCV RBC AUTO: 83.5 FL (ref 80–100)
PDW BLD-RTO: 20.5 % (ref 12.4–15.4)
PERFORMED ON: ABNORMAL
PERFORMED ON: NORMAL
PLATELET # BLD: 174 K/UL (ref 135–450)
PMV BLD AUTO: 8.6 FL (ref 5–10.5)
POTASSIUM SERPL-SCNC: 5.5 MMOL/L (ref 3.5–5.1)
RBC # BLD: 4.14 M/UL (ref 4–5.2)
SODIUM BLD-SCNC: 142 MMOL/L (ref 136–145)
WBC # BLD: 9 K/UL (ref 4–11)

## 2022-02-21 PROCEDURE — 92526 ORAL FUNCTION THERAPY: CPT

## 2022-02-21 PROCEDURE — 2500000003 HC RX 250 WO HCPCS: Performed by: INTERNAL MEDICINE

## 2022-02-21 PROCEDURE — 5A1D70Z PERFORMANCE OF URINARY FILTRATION, INTERMITTENT, LESS THAN 6 HOURS PER DAY: ICD-10-PCS | Performed by: INTERNAL MEDICINE

## 2022-02-21 PROCEDURE — 94640 AIRWAY INHALATION TREATMENT: CPT

## 2022-02-21 PROCEDURE — P9047 ALBUMIN (HUMAN), 25%, 50ML: HCPCS | Performed by: INTERNAL MEDICINE

## 2022-02-21 PROCEDURE — 90935 HEMODIALYSIS ONE EVALUATION: CPT

## 2022-02-21 PROCEDURE — 93010 ELECTROCARDIOGRAM REPORT: CPT | Performed by: INTERNAL MEDICINE

## 2022-02-21 PROCEDURE — 6360000002 HC RX W HCPCS: Performed by: INTERNAL MEDICINE

## 2022-02-21 PROCEDURE — 85027 COMPLETE CBC AUTOMATED: CPT

## 2022-02-21 PROCEDURE — 94761 N-INVAS EAR/PLS OXIMETRY MLT: CPT

## 2022-02-21 PROCEDURE — 1200000000 HC SEMI PRIVATE

## 2022-02-21 PROCEDURE — 6370000000 HC RX 637 (ALT 250 FOR IP): Performed by: INTERNAL MEDICINE

## 2022-02-21 PROCEDURE — 80048 BASIC METABOLIC PNL TOTAL CA: CPT

## 2022-02-21 PROCEDURE — 36415 COLL VENOUS BLD VENIPUNCTURE: CPT

## 2022-02-21 PROCEDURE — 92610 EVALUATE SWALLOWING FUNCTION: CPT

## 2022-02-21 RX ORDER — DEXTROSE MONOHYDRATE 50 MG/ML
100 INJECTION, SOLUTION INTRAVENOUS PRN
Status: DISCONTINUED | OUTPATIENT
Start: 2022-02-21 | End: 2022-02-24 | Stop reason: HOSPADM

## 2022-02-21 RX ORDER — PREGABALIN 75 MG/1
75 CAPSULE ORAL NIGHTLY
Status: DISCONTINUED | OUTPATIENT
Start: 2022-02-21 | End: 2022-02-21

## 2022-02-21 RX ORDER — INSULIN LISPRO 100 [IU]/ML
0-6 INJECTION, SOLUTION INTRAVENOUS; SUBCUTANEOUS
Status: DISCONTINUED | OUTPATIENT
Start: 2022-02-21 | End: 2022-02-21

## 2022-02-21 RX ORDER — LABETALOL HYDROCHLORIDE 5 MG/ML
20 INJECTION, SOLUTION INTRAVENOUS EVERY 6 HOURS PRN
Status: DISCONTINUED | OUTPATIENT
Start: 2022-02-21 | End: 2022-02-24 | Stop reason: HOSPADM

## 2022-02-21 RX ORDER — ASPIRIN 81 MG/1
81 TABLET ORAL DAILY
Status: DISCONTINUED | OUTPATIENT
Start: 2022-02-21 | End: 2022-02-24 | Stop reason: HOSPADM

## 2022-02-21 RX ORDER — NICOTINE POLACRILEX 4 MG
15 LOZENGE BUCCAL PRN
Status: DISCONTINUED | OUTPATIENT
Start: 2022-02-21 | End: 2022-02-24 | Stop reason: HOSPADM

## 2022-02-21 RX ORDER — DEXTROSE MONOHYDRATE 100 MG/ML
12.5 INJECTION, SOLUTION INTRAVENOUS PRN
Status: DISCONTINUED | OUTPATIENT
Start: 2022-02-21 | End: 2022-02-24 | Stop reason: HOSPADM

## 2022-02-21 RX ORDER — LABETALOL HYDROCHLORIDE 5 MG/ML
20 INJECTION, SOLUTION INTRAVENOUS EVERY 6 HOURS
Status: DISCONTINUED | OUTPATIENT
Start: 2022-02-21 | End: 2022-02-21

## 2022-02-21 RX ORDER — HYDROCORTISONE 0.5 %
CREAM (GRAM) TOPICAL 3 TIMES DAILY PRN
Status: DISCONTINUED | OUTPATIENT
Start: 2022-02-21 | End: 2022-02-21

## 2022-02-21 RX ORDER — PROPRANOLOL HYDROCHLORIDE 80 MG/1
80 CAPSULE, EXTENDED RELEASE ORAL DAILY
Status: DISCONTINUED | OUTPATIENT
Start: 2022-02-21 | End: 2022-02-24 | Stop reason: HOSPADM

## 2022-02-21 RX ORDER — BENZONATATE 100 MG/1
200 CAPSULE ORAL EVERY 8 HOURS PRN
Status: DISCONTINUED | OUTPATIENT
Start: 2022-02-21 | End: 2022-02-21

## 2022-02-21 RX ORDER — FLUVOXAMINE MALEATE 50 MG/1
100 TABLET, COATED ORAL NIGHTLY
Status: DISCONTINUED | OUTPATIENT
Start: 2022-02-21 | End: 2022-02-21

## 2022-02-21 RX ORDER — INSULIN LISPRO 100 [IU]/ML
0-6 INJECTION, SOLUTION INTRAVENOUS; SUBCUTANEOUS
Status: DISCONTINUED | OUTPATIENT
Start: 2022-02-21 | End: 2022-02-24 | Stop reason: HOSPADM

## 2022-02-21 RX ORDER — ALBUTEROL SULFATE 90 UG/1
2 AEROSOL, METERED RESPIRATORY (INHALATION) 2 TIMES DAILY
Status: DISCONTINUED | OUTPATIENT
Start: 2022-02-21 | End: 2022-02-24 | Stop reason: HOSPADM

## 2022-02-21 RX ORDER — INSULIN LISPRO 100 [IU]/ML
0-3 INJECTION, SOLUTION INTRAVENOUS; SUBCUTANEOUS NIGHTLY
Status: DISCONTINUED | OUTPATIENT
Start: 2022-02-21 | End: 2022-02-24 | Stop reason: HOSPADM

## 2022-02-21 RX ORDER — BUPROPION HYDROCHLORIDE 150 MG/1
150 TABLET ORAL EVERY MORNING
Status: DISCONTINUED | OUTPATIENT
Start: 2022-02-21 | End: 2022-02-23

## 2022-02-21 RX ORDER — NICOTINE POLACRILEX 4 MG
15 LOZENGE BUCCAL PRN
Status: DISCONTINUED | OUTPATIENT
Start: 2022-02-21 | End: 2022-02-21 | Stop reason: SDUPTHER

## 2022-02-21 RX ORDER — ATORVASTATIN CALCIUM 40 MG/1
40 TABLET, FILM COATED ORAL NIGHTLY
Status: DISCONTINUED | OUTPATIENT
Start: 2022-02-21 | End: 2022-02-24 | Stop reason: HOSPADM

## 2022-02-21 RX ORDER — ALBUTEROL SULFATE 90 UG/1
2 AEROSOL, METERED RESPIRATORY (INHALATION) EVERY 6 HOURS PRN
Status: DISCONTINUED | OUTPATIENT
Start: 2022-02-21 | End: 2022-02-21

## 2022-02-21 RX ORDER — ALBUTEROL SULFATE 90 UG/1
2 AEROSOL, METERED RESPIRATORY (INHALATION) EVERY 4 HOURS PRN
Status: DISCONTINUED | OUTPATIENT
Start: 2022-02-21 | End: 2022-02-24 | Stop reason: HOSPADM

## 2022-02-21 RX ORDER — HEPARIN SODIUM 1000 [USP'U]/ML
3600 INJECTION, SOLUTION INTRAVENOUS; SUBCUTANEOUS PRN
Status: DISCONTINUED | OUTPATIENT
Start: 2022-02-21 | End: 2022-02-24 | Stop reason: HOSPADM

## 2022-02-21 RX ORDER — HEPARIN SODIUM 5000 [USP'U]/ML
5000 INJECTION, SOLUTION INTRAVENOUS; SUBCUTANEOUS EVERY 8 HOURS SCHEDULED
Status: DISCONTINUED | OUTPATIENT
Start: 2022-02-21 | End: 2022-02-24 | Stop reason: HOSPADM

## 2022-02-21 RX ORDER — LISINOPRIL 10 MG/1
10 TABLET ORAL DAILY
Status: DISCONTINUED | OUTPATIENT
Start: 2022-02-21 | End: 2022-02-24 | Stop reason: HOSPADM

## 2022-02-21 RX ORDER — ALBUMIN (HUMAN) 12.5 G/50ML
25 SOLUTION INTRAVENOUS ONCE
Status: COMPLETED | OUTPATIENT
Start: 2022-02-21 | End: 2022-02-21

## 2022-02-21 RX ADMIN — ASPIRIN 81 MG: 81 TABLET, COATED ORAL at 08:21

## 2022-02-21 RX ADMIN — ATORVASTATIN CALCIUM 40 MG: 40 TABLET, FILM COATED ORAL at 20:07

## 2022-02-21 RX ADMIN — ALBUMIN (HUMAN) 25 G: 0.25 INJECTION, SOLUTION INTRAVENOUS at 13:38

## 2022-02-21 RX ADMIN — Medication 2 PUFF: at 19:28

## 2022-02-21 RX ADMIN — HEPARIN SODIUM 5000 UNITS: 5000 INJECTION INTRAVENOUS; SUBCUTANEOUS at 15:27

## 2022-02-21 RX ADMIN — HEPARIN SODIUM 5000 UNITS: 5000 INJECTION INTRAVENOUS; SUBCUTANEOUS at 05:32

## 2022-02-21 RX ADMIN — LISINOPRIL 10 MG: 10 TABLET ORAL at 08:21

## 2022-02-21 RX ADMIN — LABETALOL HYDROCHLORIDE 20 MG: 5 INJECTION INTRAVENOUS at 21:12

## 2022-02-21 RX ADMIN — PROPRANOLOL HYDROCHLORIDE 80 MG: 80 CAPSULE, EXTENDED RELEASE ORAL at 08:34

## 2022-02-21 RX ADMIN — Medication 2 PUFF: at 08:08

## 2022-02-21 RX ADMIN — BUPROPION HYDROCHLORIDE 150 MG: 150 TABLET, EXTENDED RELEASE ORAL at 08:21

## 2022-02-21 RX ADMIN — HEPARIN SODIUM 5000 UNITS: 5000 INJECTION INTRAVENOUS; SUBCUTANEOUS at 20:06

## 2022-02-21 RX ADMIN — HEPARIN SODIUM 3600 UNITS: 1000 INJECTION INTRAVENOUS; SUBCUTANEOUS at 14:49

## 2022-02-21 ASSESSMENT — PAIN SCALES - PAIN ASSESSMENT IN ADVANCED DEMENTIA (PAINAD)
BODYLANGUAGE: 0
CONSOLABILITY: 0
TOTALSCORE: 0
NEGVOCALIZATION: 0
CONSOLABILITY: 0
BODYLANGUAGE: 0
BREATHING: 0
TOTALSCORE: 0
NEGVOCALIZATION: 0
FACIALEXPRESSION: 0
FACIALEXPRESSION: 0
BREATHING: 0

## 2022-02-21 ASSESSMENT — PAIN SCALES - GENERAL
PAINLEVEL_OUTOF10: 0

## 2022-02-21 NOTE — CARE COORDINATION
Discharge Planning Assessment    SW discharge planner met with patient to discuss reason for admission, current living situation, and potential needs at the time of discharge. Pt was reported to still be confused today. Called pt's spouse to complete this assessment. Demographics/Insurance verified:  Yes    Current type of dwellinst Floor Condo - no steps - has a ramp going to their car. Living arrangements:  W/spouse     Level of function/Support:  Spouse reported patient is getting more difficult to take care of at home. Stated pt missed 2 dialysis appointments last week because spouse could not physically get patient up to walk to the car. Stated patient has been falling 5 - 10x/per day. Stated she is \"frequently confused\" as well. Patient had a recent stay last month at [facility]. Was recommended SNF at discharge but patient declined and wanted to go home with follow up to outpatient therapy as she felt this was more beneficial to her when she used it before. Spouse stated the day they came home, the patient fell at the door. Spouse reported he's not able to pick her up so often every day. SW asked about the bruises on pt's leg and shins. Spouse stated \"I guess it's from the falls. \"  Spouse stated \"she won't listen to me. \"  States he tells her to stay in bed while he goes in the other room or to the store and comes back and patient is on the floor again. PCP:  Anni Brown    Last Visit to PCP:  Last year. Spouse stated with pt's current physical deficits, he is not able to get patient to/from the doctor's office. DME:  [employer], grab bars, [employer]    Active with any community resources/agencies/skilled home care:  None. Medication compliance issues:  Spouse reported patient \"never has all her medications at the same time. \"  Reported difficulty refilling some meds, like BP med, because the doctor won't prescribe anymore until she comes into the office.   But patient cannot physically get to the office. Financial issues that could impact healthcare:  No    Tentative discharge plan:  SW feels patient needs new therapy evals to  where patient is phsycially and needs. Spouse ageeable. Asked hospitalist to put in PT/OT orders. SW informed spouse we could assist with SNF placement short term if pt qualifies. Provided pt with [facility]'s phone number to call and discuss possible need for assisted living or LTC options. If patient does not qualify for SNF, we will need to set up [place] [phone] and get patient a wheelchair.     Transportation at the time of discharge:  TBD    Electronically signed by LEYDI Carpio, [id number] on 2/21/2022 at 2:46 PM

## 2022-02-21 NOTE — PROCEDURES
Renal HD Note    Seen on HD  Rx discussed with HD nurse  Fluid removal to EDW  No need for RIA  Follow bp with fluid removal  Next HD tomorrow then back to her schedule on Wednesday. unsure

## 2022-02-21 NOTE — PLAN OF CARE
Problem: Falls - Risk of:  Goal: Will remain free from falls  Description: Will remain free from falls  2/21/2022 1323 by Belia Ryan RN  Outcome: Ongoing  Note: Fall precaution in place. Bed alarm on. Call light and bedside table within reach.      Problem: Neurological  Goal: Maximum potential motor/sensory/cognitive function  2/21/2022 1323 by Belia Ryan RN  Outcome: Ongoing  2/21/2022 1322 by Belia Ryan RN  Outcome: Ongoing

## 2022-02-21 NOTE — RT PROTOCOL NOTE
RT Inhaler-Nebulizer Bronchodilator Protocol Note    There is a bronchodilator order in the chart from a provider indicating to follow the RT Bronchodilator Protocol and there is an Initiate RT Inhaler-Nebulizer Bronchodilator Protocol order as well (see protocol at bottom of note). CXR Findings:  No results found. The findings from the last RT Protocol Assessment were as follows:   History Pulmonary Disease: Smoker 15 pack years or more  Respiratory Pattern: Dyspnea on exertion or RR 21-25 bpm  Breath Sounds: Slightly diminished and/or crackles  Cough: Strong, productive  Indication for Bronchodilator Therapy: Decreased or absent breath sounds  Bronchodilator Assessment Score: 6    Aerosolized bronchodilator medication orders have been revised according to the RT Inhaler-Nebulizer Bronchodilator Protocol below. Respiratory Therapist to perform RT Therapy Protocol Assessment initially then follow the protocol. Repeat RT Therapy Protocol Assessment PRN for score 0-3 or on second treatment, BID, and PRN for scores above 3. No Indications - adjust the frequency to every 6 hours PRN wheezing or bronchospasm, if no treatments needed after 48 hours then discontinue using Per Protocol order mode. If indication present, adjust the RT bronchodilator orders based on the Bronchodilator Assessment Score as indicated below. Use Inhaler orders unless patient has one or more of the following: on home nebulizer, not able to hold breath for 10 seconds, is not alert and oriented, cannot activate and use MDI correctly, or respiratory rate 25 breaths per minute or more, then use the equivalent nebulizer order(s) with same Frequency and PRN reasons based on the score. If a patient is on this medication at home then do not decrease Frequency below that used at home.     0-3 - enter or revise RT bronchodilator order(s) to equivalent RT Bronchodilator order with Frequency of every 4 hours PRN for wheezing or increased work of breathing using Per Protocol order mode. 4-6 - enter or revise RT Bronchodilator order(s) to two equivalent RT bronchodilator orders with one order with BID Frequency and one order with Frequency of every 4 hours PRN wheezing or increased work of breathing using Per Protocol order mode. 7-10 - enter or revise RT Bronchodilator order(s) to two equivalent RT bronchodilator orders with one order with TID Frequency and one order with Frequency of every 4 hours PRN wheezing or increased work of breathing using Per Protocol order mode. 11-13 - enter or revise RT Bronchodilator order(s) to one equivalent RT bronchodilator order with QID Frequency and an Albuterol order with Frequency of every 4 hours PRN wheezing or increased work of breathing using Per Protocol order mode. Greater than 13 - enter or revise RT Bronchodilator order(s) to one equivalent RT bronchodilator order with every 4 hours Frequency and an Albuterol order with Frequency of every 2 hours PRN wheezing or increased work of breathing using Per Protocol order mode. RT to enter RT Home Evaluation for COPD & MDI Assessment order using Per Protocol order mode.     Electronically signed by Breana Cota RCP on 2/21/2022 at 1:29 AM

## 2022-02-21 NOTE — PROGRESS NOTES
02/21/22 0129   RT Protocol   History Pulmonary Disease 1   Respiratory pattern 2   Breath sounds 2   Cough 1   Indications for Bronchodilator Therapy Decreased or absent breath sounds   Bronchodilator Assessment Score 6

## 2022-02-21 NOTE — FLOWSHEET NOTE
02/21/22 1145 02/21/22 1446   Vital Signs   BP (!) 185/93 (!) 143/76   Temp 97.8 °F (36.6 °C) 97.3 °F (36.3 °C)   Pulse 94 100   Resp 16 18     Treatment time: 3 hours    Net UF: 1.5 L    Pre weight: 82.1 kg (standing scale)  Post weight: 80.6 kg (standing scale)  EDW: 79 kg    Access used: RIJ HD tunneled cath  Access function: No problems    Medications or blood products given: Albumin 25 Gm IVPB    Regular outpatient schedule: Jov[facility] Mccall 477 MWF    Summary of response to treatment: Tolerated tx without difficulty. VSS. Copy of dialysis treatment record placed in chart, to be scanned into EMR. Report called to Soren Contreras RN.

## 2022-02-21 NOTE — CONSULTS
Renal Consult  Full Note Dictated [id number]  A/P  1. ESRD-HD MWF at [facility]. 2.  Hyperkalemia  3. H/O Missed HD Sessions  4. Anemia due to CKD  5. H/O DM  6. Currently staying at home    HD today and tomorrow.
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
## 2022-02-22 LAB
ALBUMIN SERPL-MCNC: 4 G/DL (ref 3.4–5)
ANION GAP SERPL CALCULATED.3IONS-SCNC: 18 MMOL/L (ref 3–16)
BUN BLDV-MCNC: 43 MG/DL (ref 7–20)
CALCIUM SERPL-MCNC: 9.4 MG/DL (ref 8.3–10.6)
CHLORIDE BLD-SCNC: 95 MMOL/L (ref 99–110)
CO2: 23 MMOL/L (ref 21–32)
CREAT SERPL-MCNC: 7 MG/DL (ref 0.6–1.1)
GFR AFRICAN AMERICAN: 8
GFR NON-AFRICAN AMERICAN: 6
GLUCOSE BLD-MCNC: 119 MG/DL (ref 70–99)
GLUCOSE BLD-MCNC: 128 MG/DL (ref 70–99)
GLUCOSE BLD-MCNC: 129 MG/DL (ref 70–99)
GLUCOSE BLD-MCNC: 130 MG/DL (ref 70–99)
HCT VFR BLD CALC: 35.6 % (ref 36–48)
HEMOGLOBIN: 11.6 G/DL (ref 12–16)
MCH RBC QN AUTO: 26.8 PG (ref 26–34)
MCHC RBC AUTO-ENTMCNC: 32.5 G/DL (ref 31–36)
MCV RBC AUTO: 82.5 FL (ref 80–100)
PDW BLD-RTO: 20.6 % (ref 12.4–15.4)
PERFORMED ON: ABNORMAL
PHOSPHORUS: 6.3 MG/DL (ref 2.5–4.9)
PLATELET # BLD: 139 K/UL (ref 135–450)
PMV BLD AUTO: 8.2 FL (ref 5–10.5)
POTASSIUM SERPL-SCNC: 4.7 MMOL/L (ref 3.5–5.1)
RBC # BLD: 4.31 M/UL (ref 4–5.2)
SODIUM BLD-SCNC: 136 MMOL/L (ref 136–145)
WBC # BLD: 10.9 K/UL (ref 4–11)

## 2022-02-22 PROCEDURE — 80069 RENAL FUNCTION PANEL: CPT

## 2022-02-22 PROCEDURE — 36415 COLL VENOUS BLD VENIPUNCTURE: CPT

## 2022-02-22 PROCEDURE — 92526 ORAL FUNCTION THERAPY: CPT

## 2022-02-22 PROCEDURE — 6360000002 HC RX W HCPCS: Performed by: INTERNAL MEDICINE

## 2022-02-22 PROCEDURE — 85027 COMPLETE CBC AUTOMATED: CPT

## 2022-02-22 PROCEDURE — APPSS30 APP SPLIT SHARED TIME 16-30 MINUTES: Performed by: NURSE PRACTITIONER

## 2022-02-22 PROCEDURE — 94761 N-INVAS EAR/PLS OXIMETRY MLT: CPT

## 2022-02-22 PROCEDURE — 2700000000 HC OXYGEN THERAPY PER DAY

## 2022-02-22 PROCEDURE — 6370000000 HC RX 637 (ALT 250 FOR IP): Performed by: INTERNAL MEDICINE

## 2022-02-22 PROCEDURE — APPNB30 APP NON BILLABLE TIME 0-30 MINS: Performed by: NURSE PRACTITIONER

## 2022-02-22 PROCEDURE — 90935 HEMODIALYSIS ONE EVALUATION: CPT

## 2022-02-22 PROCEDURE — 94640 AIRWAY INHALATION TREATMENT: CPT

## 2022-02-22 PROCEDURE — 1200000000 HC SEMI PRIVATE

## 2022-02-22 PROCEDURE — 2500000003 HC RX 250 WO HCPCS: Performed by: INTERNAL MEDICINE

## 2022-02-22 RX ADMIN — Medication 2 PUFF: at 11:47

## 2022-02-22 RX ADMIN — HEPARIN SODIUM 5000 UNITS: 5000 INJECTION INTRAVENOUS; SUBCUTANEOUS at 13:43

## 2022-02-22 RX ADMIN — HEPARIN SODIUM 5000 UNITS: 5000 INJECTION INTRAVENOUS; SUBCUTANEOUS at 05:13

## 2022-02-22 RX ADMIN — HEPARIN SODIUM 3600 UNITS: 1000 INJECTION INTRAVENOUS; SUBCUTANEOUS at 09:57

## 2022-02-22 RX ADMIN — BUPROPION HYDROCHLORIDE 150 MG: 150 TABLET, EXTENDED RELEASE ORAL at 10:11

## 2022-02-22 RX ADMIN — ASPIRIN 81 MG: 81 TABLET, COATED ORAL at 10:11

## 2022-02-22 RX ADMIN — Medication 2 PUFF: at 21:10

## 2022-02-22 RX ADMIN — LABETALOL HYDROCHLORIDE 20 MG: 5 INJECTION INTRAVENOUS at 20:13

## 2022-02-22 RX ADMIN — HEPARIN SODIUM 5000 UNITS: 5000 INJECTION INTRAVENOUS; SUBCUTANEOUS at 20:13

## 2022-02-22 RX ADMIN — LISINOPRIL 10 MG: 10 TABLET ORAL at 10:11

## 2022-02-22 RX ADMIN — ATORVASTATIN CALCIUM 40 MG: 40 TABLET, FILM COATED ORAL at 20:13

## 2022-02-22 ASSESSMENT — PAIN SCALES - GENERAL
PAINLEVEL_OUTOF10: 0

## 2022-02-22 ASSESSMENT — PAIN SCALES - PAIN ASSESSMENT IN ADVANCED DEMENTIA (PAINAD)
BREATHING: 0
TOTALSCORE: 0
CONSOLABILITY: 0
TOTALSCORE: 0
NEGVOCALIZATION: 0
BODYLANGUAGE: 0
CONSOLABILITY: 0
BREATHING: 0
FACIALEXPRESSION: 0
NEGVOCALIZATION: 0
TOTALSCORE: 0
NEGVOCALIZATION: 0
BODYLANGUAGE: 0
CONSOLABILITY: 0
FACIALEXPRESSION: 0
TOTALSCORE: 0
NEGVOCALIZATION: 0
BREATHING: 0
BODYLANGUAGE: 0
FACIALEXPRESSION: 0
BREATHING: 0
TOTALSCORE: 0
NEGVOCALIZATION: 0
FACIALEXPRESSION: 0
BODYLANGUAGE: 0
BREATHING: 0
CONSOLABILITY: 0
TOTALSCORE: 0
TOTALSCORE: 0
FACIALEXPRESSION: 0
NEGVOCALIZATION: 0
NEGVOCALIZATION: 0
BREATHING: 0
BODYLANGUAGE: 0
BREATHING: 0
BREATHING: 0
CONSOLABILITY: 0
BODYLANGUAGE: 0
CONSOLABILITY: 0
FACIALEXPRESSION: 0
BODYLANGUAGE: 0
CONSOLABILITY: 0
CONSOLABILITY: 0
BREATHING: 0
BODYLANGUAGE: 0
CONSOLABILITY: 0
FACIALEXPRESSION: 0
FACIALEXPRESSION: 0
CONSOLABILITY: 0
NEGVOCALIZATION: 0
TOTALSCORE: 0
NEGVOCALIZATION: 0
FACIALEXPRESSION: 0
FACIALEXPRESSION: 0
BODYLANGUAGE: 0
CONSOLABILITY: 0
NEGVOCALIZATION: 0
BREATHING: 0
FACIALEXPRESSION: 0
TOTALSCORE: 0
FACIALEXPRESSION: 0
NEGVOCALIZATION: 0
CONSOLABILITY: 0
NEGVOCALIZATION: 0
BODYLANGUAGE: 0

## 2022-02-22 NOTE — FLOWSHEET NOTE
02/22/22 0645 02/22/22 0954   Vital Signs   BP (!) 148/74 (!) 165/80   Temp 98.6 °F (37 °C) 97 °F (36.1 °C)   Pulse 98 96   Resp 18 20     Treatment time: 3 hours    Net UF: 1 L    Pre weight: 67 kg (bed scale)  Post weight: 66 kg (bed scale)  EDW: 79 kg    Access used: RIJ HD Tunneled cath. SOCORRO AVF not in use  Access function: No problems    Medications or blood products given: None    Regular outpatient schedule: Jovany Mccall 7 MWF    Summary of response to treatment: Tolerated tx without difficulty. VSS. Copy of dialysis treatment record placed in chart, to be scanned into EMR. Report called to Maggie Wagner RN.

## 2022-02-22 NOTE — PROGRESS NOTES
[facility] Note    Patient Active Problem List   Diagnosis    Type 2 diabetes mellitus, with long-term current use of insulin (HCC)    Mixed hyperlipidemia    Migraine headache    Anemia    Diabetic foot infection ([address]    Pyogenic inflammation of bone ([address]    History of medication noncompliance    Osteomyelitis of left foot (HCC)    Nephrotic syndrome    Peripheral edema    Pulmonary edema    Right sided numbness    Tobacco dependence    Chronic kidney disease (CKD), stage III (moderate) ([facility])    Chronic diastolic (congestive) heart failure ([facility])    History of CVA (cerebrovascular accident)    Arterial ischemic stroke, ICA, left, acute ([address]    HTN (hypertension), benign    DM (diabetes mellitus), secondary, uncontrolled, w/neurologic complic ([address]    Dyslipidemia    Smoker    Panic disorder    Isolated proteinuria    Acute on chronic diastolic heart failure (HCC)    Diabetic peripheral neuropathy (HCC)    Acute respiratory failure ([facility])    Depression    Abnormal gait    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, associated with type 2 diabetes mellitus ([address]    Cellulitis of right foot    Hidradenitis suppurativa    Hypocalcemia    Non-toxic multinodular goiter    Polyneuropathy due to type 2 diabetes mellitus (HCC)    Proliferative diabetic retinopathy associated with type 2 diabetes mellitus ([address]    ESRD (end stage renal disease) on dialysis ([address]    Acute respiratory failure with hypoxemia ([address]    Acute respiratory failure due to COVID-19 ([address]    Suspected COVID-19 virus infection    Epiglottitis    Recurrent falls    Altered mental status       Past Medical History:   has a past medical history of Blind in both eyes, Cerebral artery occlusion with cerebral infarction ([address] CHF (congestive heart failure) ([address] Chronic kidney disease, Depression, Diabetes mellitus out of control ([facility] Diabetes mellitus, type II ([facility] Diabetic neuropathy associated with type 2 diabetes mellitus ([facility] Generalized headaches, Hypertension, Infertility, Insomnia, Migraine headache, Mixed hyperlipidemia, Otitis media, Pelvic abscess in female, Pneumonia, Stroke [facility]), and Stroke ([facility] Past Social History:   reports that she has been smoking cigarettes. She has been smoking about 1.00 pack per day. She has never used smokeless tobacco. She reports that she does not drink alcohol and does not use drugs. Subjective:  Seen on HD. BP lower. Review of Systems confused    Objective:      /76   Pulse 101   Temp 98.6 °F (37 °C) (Oral)   Resp 16   Ht 5' 7\" (1.702 m)   Wt 145 lb 8.1 oz (66 kg)   LMP 02/06/2022 (Approximate)   SpO2 95%   BMI 22.79 kg/m²     Wt Readings from Last 3 Encounters:   02/22/22 145 lb 8.1 oz (66 kg)   02/10/22 178 lb (80.7 kg)   01/21/22 189 lb 2.5 oz (85.8 kg)       BP Readings from Last 3 Encounters:   02/22/22 130/76   02/10/22 139/70   02/10/22 107/66       Chest- clear  Heart-regular  Abd-soft  Ext- no edema    Labs  Hemoglobin   Date Value Ref Range Status   02/22/2022 11.6 (L) 12.0 - 16.0 g/dL Final     Hematocrit   Date Value Ref Range Status   02/22/2022 35.6 (L) 36.0 - 48.0 % Final     WBC   Date Value Ref Range Status   02/22/2022 10.9 4.0 - 11.0 K/uL Final     Platelets   Date Value Ref Range Status   02/22/2022 139 135 - 450 K/uL Final     Lab Results   Component Value Date    CREATININE 7.0 ([place]) 02/22/2022    BUN 43 (H) 02/22/2022     02/22/2022    K 4.7 02/22/2022    CL 95 (L) 02/22/2022    CO2 23 02/22/2022       Assessment/Plan:  1. ESRD. Outpatient hemodialysis Mondays, Wednesdays, Fridays at  [facility]. Missed multiple hemodialysis sessions. Outpatient  HD social work looking into reasons for missed treatments. HD today. Back to her schedule tomorrow. Euvolemic.    2.  Hyperkalemia, better with hemodialysis. 3.  Metabolic acidosis. Follow with hemodialysis. Better. 4.  History of hypertension. Follow with fluid removal.  BP lower. Off bp meds. 5.  Anemia due to CKD. Hemoglobin is acceptable. No need for RIA at  this time. 6.  History of CVA. 7.  History  of diabetes mellitus. Management as per Medicine. 8.  Dispo-hopefully after HD tomorrow.      Analilia Worley MD

## 2022-02-22 NOTE — PROGRESS NOTES
VASCULAR     S/p right BC AVF on 2/10 continues to mature. Will plan to follow up office visit with Dr. Sarai Clay on 3/9 at 11:30 am for repeat check. Continue to use tunneled dialysis catheter for dialysis.        Electronically signed by RIVERA Giordano CNP on 2/22/2022 at 3:45 PM

## 2022-02-22 NOTE — PROGRESS NOTES
Progress Note - Dr. Thornton [facility] - Internal Medicine  PCP: Lo[address] [facility] / [address] [place] [address] [phone]    Hospital Day: 2  Code Status: Prior  Current Diet: ADULT DIET; Dysphagia - Minced and Moist; 3 carb choices (45 gm/meal)        CC: follow up on medical issues    Subjective:   Zee Escalante is a 55 y.o. female. Pt seen and examined  Chart reviewed since last visit, labs and imaging below        Doing ok  For HD again today  Then regular HD weds  Awaiting pt/ot eval  Pt would benefit from SNF; unclear if she will accept this    She denies chest pain, denies shortness of breath, denies nausea,  denies emesis. 10 system Review of Systems is reviewed with patient, and pertinent positives are noted in HPI above . Otherwise, Review of systems is negative. I have reviewed the patient's medical and social history in detail and updated the computerized patient record. To recap: She  has a past medical history of Blind in both eyes, Cerebral artery occlusion with cerebral infarction ([address] CHF (congestive heart failure) ([address] Chronic kidney disease, Depression, Diabetes mellitus out of control ([address] Diabetes mellitus, type II ([address] Diabetic neuropathy associated with type 2 diabetes mellitus ([address] Generalized headaches, Hypertension, Infertility, Insomnia, Migraine headache, Mixed hyperlipidemia, Otitis media, Pelvic abscess in female, Pneumonia, Stroke [facility]), and Stroke ([facility] . She  has a past surgical history that includes Cervix surgery; eye surgery; Foot surgery (Right); Foot surgery (Bilateral); Foot surgery (Left); IR TUNNELED CVC PLACE WO SQ PORT/PUMP > 5 YEARS (9/7/2021); and Dialysis fistula creation (Right, 2/10/2022). . She  reports that she has been smoking cigarettes. She has been smoking about 1.00 pack per day. She has never used smokeless tobacco. She reports that she does not drink alcohol and does not use drugs. .        Active Hospital Problems    Diagnosis Date Noted    Altered mental status [R41.82] 02/20/2022    ESRD (end stage renal disease) on dialysis ([facility] [N18.6, Z99.2] 10/04/2021    HTN (hypertension), benign [I10]     Type 2 diabetes mellitus, with long-term current use of insulin (HCC) [E11.9, Z79.4]     Mixed hyperlipidemia [E78.2]        Current Facility-Administered Medications: aspirin EC tablet 81 mg, 81 mg, Oral, Daily  atorvastatin (LIPITOR) tablet 40 mg, 40 mg, Oral, Nightly  buPROPion (WELLBUTRIN XL) extended release tablet 150 mg, 150 mg, Oral, QAM  lisinopril (PRINIVIL;ZESTRIL) tablet 10 mg, 10 mg, Oral, Daily  propranolol (INDERAL LA) extended release capsule 80 mg, 80 mg, Oral, Daily  albuterol sulfate  (90 Base) MCG/ACT inhaler 2 puff, 2 puff, Inhalation, BID  albuterol sulfate  (90 Base) MCG/ACT inhaler 2 puff, 2 puff, Inhalation, Q4H PRN  glucose (GLUTOSE) 40 % oral gel 15 g, 15 g, Oral, PRN  dextrose 50 % IV solution, 12.5 g, IntraVENous, PRN  glucagon (rDNA) injection 1 mg, 1 mg, IntraMUSCular, PRN  dextrose 5 % solution, 100 mL/hr, IntraVENous, PRN  insulin lispro (1 Unit Dial) 0-6 Units, 0-6 Units, SubCUTAneous, TID WC  insulin lispro (1 Unit Dial) 0-3 Units, 0-3 Units, SubCUTAneous, Nightly  heparin (porcine) injection 5,000 Units, 5,000 Units, SubCUTAneous, 3 times per day  heparin (porcine) injection 3,600 Units, 3,600 Units, IntraCATHeter, PRN  labetalol (NORMODYNE;TRANDATE) injection 20 mg, 20 mg, IntraVENous, Q6H PRN  dextrose 50 % IV solution, 25 g, IntraVENous, PRN  naloxone (NARCAN) injection 1 mg, 1 mg, IntraVENous, Once  propranolol (INDERAL LA) extended release capsule 80 mg, 80 mg, Oral, Once         Objective:  BP (!) 151/77   Pulse 101   Temp 98.3 °F (36.8 °C) (Axillary)   Resp 18   Ht 5' 7\" (1.702 m)   Wt 151 lb 9.6 oz (68.8 kg)   LMP 02/06/2022 (Approximate)   SpO2 92%   BMI 23.74 kg/m²      Patient Vitals for the past 24 hrs:   BP Temp Temp src Pulse Resp SpO2 Weight   02/22/22 0600 -- -- -- 101 -- -- --   02/22/22 0353 (!) 151/77 -- -- -- -- 92 % 151 lb 9.6 oz (68.8 kg)   02/22/22 0319 (!) 160/89 98.3 °F (36.8 °C) Axillary 116 18 (!) 89 % --   02/22/22 0229 -- -- -- 106 -- -- --   02/21/22 2358 -- -- -- 105 -- -- --   02/21/22 2320 -- -- -- -- -- 92 % --   02/21/22 2311 (!) 170/82 99.2 °F (37.3 °C) Oral 101 18 (!) 88 % --   02/21/22 2207 (!) 165/80 -- -- 98 -- -- --   02/21/22 1954 (!) 179/88 99.9 °F (37.7 °C) Axillary 102 14 91 % --   02/21/22 1928 -- -- -- -- 17 90 % --   02/21/22 1824 -- -- -- 104 -- -- --   02/21/22 1525 (!) 167/82 97.6 °F (36.4 °C) Oral 105 17 93 % --   02/21/22 1446 (!) 143/76 97.3 °F (36.3 °C) -- 100 18 -- 177 lb 11.1 oz (80.6 kg)   02/21/22 1145 (!) 185/93 97.8 °F (36.6 °C) -- -- 16 -- 181 lb (82.1 kg)   02/21/22 1120 (!) 176/96 97.6 °F (36.4 °C) Oral 95 16 93 % --   02/21/22 0813 (!) 172/94 97.4 °F (36.3 °C) Oral 99 16 94 % --     Patient Vitals for the past 96 hrs (Last 3 readings):   Weight   02/22/22 0353 151 lb 9.6 oz (68.8 kg)   02/21/22 1446 177 lb 11.1 oz (80.6 kg)   02/21/22 1145 181 lb (82.1 kg)           Intake/Output Summary (Last 24 hours) at 2/22/2022 9889  Last data filed at 2/21/2022 1446  Gross per 24 hour   Intake 940 ml   Output 2200 ml   Net -1260 ml         Physical Exam:   Vitals as above  General appearance: alert, appears stated age and cooperative    Head: Normocephalic, without obvious abnormality, atraumatic    Lungs: clear to auscultation bilaterally    Heart: regular rate and rhythm, S1, S2 normal, no murmur    Abdomen: soft, non-tender; bowel sounds normal; no masses, no organomegaly    Extremities: extremities normal, atraumatic, no cyanosis, 2+ edema      Labs:  Lab Results   Component Value Date    WBC 10.9 02/22/2022    HGB 11.6 (L) 02/22/2022    HCT 35.6 (L) 02/22/2022     02/22/2022    CHOL 164 02/24/2021    TRIG 319 (H) 08/31/2021    HDL 41 02/24/2021    LDLDIRECT 100 (H) 04/18/2011    ALT <5 (L) 02/20/2022    AST 26 02/20/2022    NA 136 02/22/2022    K 4.7 02/22/2022    CL 95 (L) 02/22/2022    CREATININE 7.0 (HH) 02/22/2022    BUN 43 (H) 02/22/2022    CO2 23 02/22/2022    INR 0.97 02/07/2022    LABA1C 6.7 01/05/2022    LABMICR YES 02/20/2022     Lab Results   Component Value Date    CKTOTAL 25 (L) 09/13/2021    TROPONINI 0.25 (H) 02/20/2022       Recent Imaging Results are Reviewed:  CT HEAD WO CONTRAST    Result Date: 2/20/2022  EXAMINATION: CTA OF THE HEAD AND NECK WITH CONTRAST; CT OF THE HEAD WITHOUT CONTRAST 2/20/2022 5:39 pm: TECHNIQUE: CTA of the head and neck was performed with the administration of intravenous contrast. Multiplanar reformatted images are provided for review. MIP images are provided for review. Stenosis of the internal carotid arteries measured using NASCET criteria. Dose modulation, iterative reconstruction, and/or weight based adjustment of the mA/kV was utilized to reduce the radiation dose to as low as reasonably achievable.; CT of the head was performed without the administration of intravenous contrast. Dose modulation, iterative reconstruction, and/or weight based adjustment of the mA/kV was utilized to reduce the radiation dose to as low as reasonably achievable. Noncontrast CT of the head with reconstructed 2-D images are also provided for review. COMPARISON: None. HISTORY: ORDERING SYSTEM PROVIDED HISTORY: ams FINDINGS: CT HEAD: BRAIN/VENTRICLES:  Mild generalized atrophy and changes of mild chronic small vessel ischemic disease are present. No acute intracranial hemorrhage or extraaxial fluid collection. Grey-white differentiation is maintained. No evidence of mass, mass effect or midline shift. No evidence of hydrocephalus. ORBITS: The visualized portion of the orbits demonstrate no acute abnormality. SINUSES:  The visualized paranasal sinuses and mastoid air cells demonstrate no acute abnormality. SOFT TISSUES/SKULL: No acute abnormality of the visualized skull or soft tissues.  CTA NECK: AORTIC ARCH/ARCH VESSELS: No dissection or arterial injury. No significant stenosis of the brachiocephalic or subclavian arteries. CAROTID ARTERIES: Mild atherosclerosis in carotid bifurcations. No dissection, arterial injury, or hemodynamically significant stenosis by NASCET criteria. VERTEBRAL ARTERIES: No dissection, arterial injury, or significant stenosis. SOFT TISSUES: The lung apices are clear. No cervical or superior mediastinal lymphadenopathy. The larynx and pharynx are unremarkable. No acute abnormality of the salivary and thyroid glands. BONES: No acute osseous abnormality. CTA HEAD: ANTERIOR CIRCULATION: No significant stenosis of the intracranial internal carotid, anterior cerebral, or middle cerebral arteries. No aneurysm. POSTERIOR CIRCULATION: No significant stenosis of the vertebral, basilar, or posterior cerebral arteries. No aneurysm. OTHER: No dural venous sinus thrombosis on this non-dedicated study. 1. Age related involutional changes with no acute intracranial abnormality. 2. No flow-limiting arterial stenosis in the head and neck. CTA HEAD NECK W CONTRAST    Result Date: 2/20/2022  EXAMINATION: CTA OF THE HEAD AND NECK WITH CONTRAST; CT OF THE HEAD WITHOUT CONTRAST 2/20/2022 5:39 pm: TECHNIQUE: CTA of the head and neck was performed with the administration of intravenous contrast. Multiplanar reformatted images are provided for review. MIP images are provided for review. Stenosis of the internal carotid arteries measured using NASCET criteria. Dose modulation, iterative reconstruction, and/or weight based adjustment of the mA/kV was utilized to reduce the radiation dose to as low as reasonably achievable.; CT of the head was performed without the administration of intravenous contrast. Dose modulation, iterative reconstruction, and/or weight based adjustment of the mA/kV was utilized to reduce the radiation dose to as low as reasonably achievable.  Noncontrast CT of the head with reconstructed 2-D images are also provided for review. COMPARISON: None. HISTORY: ORDERING SYSTEM PROVIDED HISTORY: ams FINDINGS: CT HEAD: BRAIN/VENTRICLES:  Mild generalized atrophy and changes of mild chronic small vessel ischemic disease are present. No acute intracranial hemorrhage or extraaxial fluid collection. Grey-white differentiation is maintained. No evidence of mass, mass effect or midline shift. No evidence of hydrocephalus. ORBITS: The visualized portion of the orbits demonstrate no acute abnormality. SINUSES:  The visualized paranasal sinuses and mastoid air cells demonstrate no acute abnormality. SOFT TISSUES/SKULL: No acute abnormality of the visualized skull or soft tissues. CTA NECK: AORTIC ARCH/ARCH VESSELS: No dissection or arterial injury. No significant stenosis of the brachiocephalic or subclavian arteries. CAROTID ARTERIES: Mild atherosclerosis in carotid bifurcations. No dissection, arterial injury, or hemodynamically significant stenosis by NASCET criteria. VERTEBRAL ARTERIES: No dissection, arterial injury, or significant stenosis. SOFT TISSUES: The lung apices are clear. No cervical or superior mediastinal lymphadenopathy. The larynx and pharynx are unremarkable. No acute abnormality of the salivary and thyroid glands. BONES: No acute osseous abnormality. CTA HEAD: ANTERIOR CIRCULATION: No significant stenosis of the intracranial internal carotid, anterior cerebral, or middle cerebral arteries. No aneurysm. POSTERIOR CIRCULATION: No significant stenosis of the vertebral, basilar, or posterior cerebral arteries. No aneurysm. OTHER: No dural venous sinus thrombosis on this non-dedicated study. 1. Age related involutional changes with no acute intracranial abnormality. 2. No flow-limiting arterial stenosis in the head and neck.        Lab Results   Component Value Date    GLUCOSE 130 02/22/2022     Lab Results   Component Value Date    POCGLU 116 02/21/2022     BP (!) 151/77 Pulse 101   Temp 98.3 °F (36.8 °C) (Axillary)   Resp 18   Ht 5' 7\" (1.702 m)   Wt 151 lb 9.6 oz (68.8 kg)   LMP 02/06/2022 (Approximate)   SpO2 92%   BMI 23.74 kg/m²     Assessment and Plan:  Principal Problem:    Altered mental status -Established problem. Stable. Poss metabolic, poss due to illicit drug use  Plan: cont to run on HD  Active Problems:    Type 2 diabetes mellitus, with long-term current use of insulin ([facility] -Established problem. Stable. FSBS 116  Plan: cont ccc diet, sliding scale, home meds    Mixed hyperlipidemia -Established problem. Stable. Plan: Continue present orders/plan. HTN (hypertension), benign -Established problem. Stable.   151/77  Plan: stay on same meds    ESRD (end stage renal disease) on dialysis ([address]    Disp - HD today/tomorrow; hopefully to SNF after HD weds 2/23    (Please note that portions of this note were completed with a voice recognition program.  Efforts were made to edit the dictations but occasionally words are mis-transcribed.)        Kasie Garcia MD  2/22/2022

## 2022-02-22 NOTE — PROGRESS NOTES
Vascular Progress Note    2/22/2022 8:57 AM    Chief complaint / Reason for visit : s/p right brachiocephalic AVF 8/46/76, asked by dialysis RN to look at incision     Subjective:  Patient seen in dialysis. She is drowsy. Denies any pain in her right arm or hand. VSS, tmax 99.2 F overnight.      Vital Signs: BP (!) 148/74   Pulse 98   Temp 98.6 °F (37 °C) (Temporal)   Resp 18   Ht 5' 7\" (1.702 m)   Wt 147 lb 11.3 oz (67 kg)   LMP 02/06/2022 (Approximate)   SpO2 92%   BMI 23.13 kg/m²  O2 Flow Rate (L/min): 2 L/min   I/O:    Intake/Output Summary (Last 24 hours) at 2/22/2022 0857  Last data filed at 2/21/2022 1446  Gross per 24 hour   Intake 700 ml   Output 2200 ml   Net -1500 ml       Physical Exam:   General: drowsy  Vascular: right brachiocephalic AVF + bruit/thrill  Skin: right arm incision intact with no drainage, no erythema   Lines: right IJ TDC (+)    Labs:   Lab Results   Component Value Date     02/22/2022    K 4.7 02/22/2022    K 4.9 02/20/2022    CL 95 02/22/2022    CO2 23 02/22/2022    BUN 43 02/22/2022    CREATININE 7.0 02/22/2022    GFRAA 8 02/22/2022    GFRAA >60 11/09/2011    LABGLOM 6 02/22/2022    GLUCOSE 130 02/22/2022    PHOS 6.3 02/22/2022    MG 2.00 12/28/2021    CALCIUM 9.4 02/22/2022     Lab Results   Component Value Date    WBC 10.9 02/22/2022    RBC 4.31 02/22/2022    HGB 11.6 02/22/2022    HCT 35.6 02/22/2022    MCV 82.5 02/22/2022    RDW 20.6 02/22/2022     02/22/2022     Lab Results   Component Value Date    INR 0.97 02/07/2022    PROTIME 10.9 02/07/2022          Scheduled Meds:    aspirin  81 mg Oral Daily    atorvastatin  40 mg Oral Nightly    buPROPion  150 mg Oral QAM    lisinopril  10 mg Oral Daily    propranolol  80 mg Oral Daily    albuterol sulfate HFA  2 puff Inhalation BID    insulin lispro  0-6 Units SubCUTAneous TID WC    insulin lispro  0-3 Units SubCUTAneous Nightly    heparin (porcine)  5,000 Units SubCUTAneous 3 times per day    naloxone  1 mg IntraVENous Once    propranolol  80 mg Oral Once     Continuous Infusions:    dextrose           Assessment:   S/p right brachiocephalic AVF 6/29/72 - incision c/d/i, + bruit and thrill   ESRD on HD   Altered mental status   H/o CVA  HTN  HLD    Plan:  Currently getting HD through right IJ TDC. Will discuss with Dr. Arvell Harada when able to cannulate right brachiocephalic AVF. Patient educated on plan of care and disease process. All questions answered.         Electronically signed by RIVERA Fofana CNP on 2/22/2022 at 8:57 AM

## 2022-02-22 NOTE — PROGRESS NOTES
Speech Language Pathology  Dysphagia Treatment Note    Name:  Jeremie Willis  :   1975  Medical Diagnosis:  Altered mental status [R41.82]  Generalized weakness [R53.1]  Overuse of medication [Z91.14]  Chronic prescription benzodiazepine use [Z79.899]  Hypoglycemia due to type 2 diabetes mellitus ([facility] [E11.649]  Stage 5 chronic kidney disease on chronic dialysis ([facility] [N18.6, Z99.2]  Treatment Diagnosis: Oropharyngeal Dysphagia  Pain level: Unable to discern       Subjective:   Pt obtunded in bed on 3L of O2. She had an upward gaze and limited ability to follow commands and respond to basic conversation. She was unable to maintain attention to complete automatic speech tasks. Pt oriented to self only. She was able to express basic need (toileting). This is a significant decline from her previous admission (2022). Diet level prior to treatment:   Dysphagia II Minced and Moist; Thin liquids   Meds crushed in puree     Tolerance of Current Diet Level:  Limited tolerance secondary to alertness. RN has been able to provide pills crushed in puree only. Assessment of Texture Tolerance:  Pt with limited acceptance due to poor alertness and need for toileting. Pt's oral cavity was dry and mucous coated. Provided oral care via toothette. Attempted puree and thin liquid via cup and straw. Pt with limited response to cup presentation, resulted in anterior bolus loss. She was able to propel the bolus via straw and tolerated without overt s/s of aspiration. She required verbal cues to propel puree consistency from the spoon and to initiate a swallow. No further presentations were provided due to cognitive state. Diet and Treatment Recommendations 2022:  Continue with Dysphagia II Minced and Moist Solids and thin liquids, straws ok   * high risk for aspiration and/or nutritional compromise  * do not feed unless responding to commands     Compensatory Swallowing Strategies:   Alternate solids/liquids , Check for pocketing of food L, Check for pocketing of food R, Upright as possible with all PO intake , Assist Feed , External Pacing , Small bites/sips , Eat/feed slowly, Remain upright 30-45 min , Other:      SHORT TERM DYSPHAGIA GOALS    Pt will functionally tolerate recommended diet with no overt clinical s/s of aspiration   2/22/2022 ongoing   Pt will demonstrate understanding of aspiration risk and precautions via education/demonstration with occasional prompting   2/22/2022 ongoing   Pt will advance to least restrictive diet as indicated    2/22/2022 ongoing   If clinical s/s of aspiration/penetration continue to be noted, Pt will participate in Modified Barium Swallow Study    2/22/2022 ongoing     PLAN OF CARE:   Speech therapy for dysphagia tx 3-5 times per week during acute care stay.     Patient/Family Education:Education given to the Pt and nurse, who verbalized understanding    Discharge Recommendations: Discharge recommendations to be determined pending ongoing follow-up during acute care stay    Timed Code Treatment:  0    Total Treatment Time:   10 minutes     If patient discharges prior to next session this note will serve as a discharge summary. Signature:   Kathy Loya M.A. CCC-SLP MARJAN [id number]  Speech-Language Pathologist   2/22/2022 2:51 PM

## 2022-02-22 NOTE — PLAN OF CARE
Problem: Falls - Risk of:  Goal: Will remain free from falls  Description: Will remain free from falls  Outcome: Met This Shift  Goal: Absence of physical injury  Description: Absence of physical injury  Outcome: Met This Shift     Problem: Skin Integrity:  Goal: Will show no infection signs and symptoms  Description: Will show no infection signs and symptoms  Outcome: Met This Shift  Goal: Absence of new skin breakdown  Description: Absence of new skin breakdown  Outcome: Met This Shift     Problem: OXYGENATION/RESPIRATORY FUNCTION  Goal: Patient will maintain patent airway  Outcome: Met This Shift     Problem: OXYGENATION/RESPIRATORY FUNCTION  Goal: Patient will achieve/maintain normal respiratory rate/effort  Description: Respiratory rate and effort will be within normal limits for the patient  Outcome: Ongoing     Problem: HEMODYNAMIC STATUS  Goal: Patient has stable vital signs and fluid balance  Outcome: Ongoing     Problem: Neurological  Goal: Maximum potential motor/sensory/cognitive function  Outcome: Not Met This Shift

## 2022-02-22 NOTE — PLAN OF CARE
Problem: Falls - Risk of:  Goal: Will remain free from falls  Description: Will remain free from falls  2/22/2022 1109 by Kellee Rey RN  Outcome: Ongoing  Note: Bed alarm in place. Non-slip socks given to pt. Pt educated to call if she needs held. Pt is confused but is compliant and does not try to get up without assistance. Problem: OXYGENATION/RESPIRATORY FUNCTION  Goal: Patient will maintain patent airway  2/22/2022 1109 by Kellee Rey RN  Outcome: Ongoing     Problem: OXYGENATION/RESPIRATORY FUNCTION  Goal: Patient will achieve/maintain normal respiratory rate/effort  Description: Respiratory rate and effort will be within normal limits for the patient  2/22/2022 1109 by Kellee Rey RN  Outcome: Ongoing     Problem: HEMODYNAMIC STATUS  Goal: Patient has stable vital signs and fluid balance  2/22/2022 1109 by Kellee Rey RN  Outcome: Ongoing     Problem: FLUID AND ELECTROLYTE IMBALANCE  Goal: Fluid and electrolyte balance are achieved/maintained  Outcome: Ongoing     Problem: ACTIVITY INTOLERANCE/IMPAIRED MOBILITY  Goal: Mobility/activity is maintained at optimum level for patient  Outcome: Ongoing        Problem: Falls - Risk of:  Goal: Absence of physical injury  Description: Absence of physical injury  2/22/2022 1109 by Kellee Rey RN  Outcome: Ongoing     Problem: Skin Integrity:  Goal: Will show no infection signs and symptoms  Description: Will show no infection signs and symptoms  2/22/2022 1109 by Kellee Rey RN  Outcome: Ongoing     Problem: Skin Integrity:  Goal: Absence of new skin breakdown  Description: Absence of new skin breakdown  2/22/2022 1109 by Kellee Rey RN  Outcome: Ongoing  Note: Pt is turned every 2 hrs and is assessed for skin breakdown every 2 hrs.     Problem: Neurological  Goal: Maximum potential motor/sensory/cognitive function  2/22/2022 1109 by Kellee Rey RN  Outcome: Ongoing

## 2022-02-22 NOTE — PROGRESS NOTES
Physical/Occupational Therapy  Randy Durham    Attempted to see pt for PT/OT evaluation. Patient currently off the floor for HD. Will hold therapy at this time and will follow up with pt as schedule permits.  Thanks, Lady Zurita, PT, DPT [id number], Jadon Marinelli OTR/L [id number]

## 2022-02-23 LAB
ANION GAP SERPL CALCULATED.3IONS-SCNC: 22 MMOL/L (ref 3–16)
BASOPHILS ABSOLUTE: 0.1 K/UL (ref 0–0.2)
BASOPHILS RELATIVE PERCENT: 0.3 %
BUN BLDV-MCNC: 44 MG/DL (ref 7–20)
CALCIUM SERPL-MCNC: 9.6 MG/DL (ref 8.3–10.6)
CHLORIDE BLD-SCNC: 91 MMOL/L (ref 99–110)
CO2: 23 MMOL/L (ref 21–32)
CREAT SERPL-MCNC: 6.3 MG/DL (ref 0.6–1.1)
EOSINOPHILS ABSOLUTE: 0.1 K/UL (ref 0–0.6)
EOSINOPHILS RELATIVE PERCENT: 0.5 %
GFR AFRICAN AMERICAN: 9
GFR NON-AFRICAN AMERICAN: 7
GLUCOSE BLD-MCNC: 111 MG/DL (ref 70–99)
GLUCOSE BLD-MCNC: 113 MG/DL (ref 70–99)
GLUCOSE BLD-MCNC: 115 MG/DL (ref 70–99)
GLUCOSE BLD-MCNC: 127 MG/DL (ref 70–99)
GLUCOSE BLD-MCNC: 95 MG/DL (ref 70–99)
HCT VFR BLD CALC: 36.7 % (ref 36–48)
HEMOGLOBIN: 11.7 G/DL (ref 12–16)
LYMPHOCYTES ABSOLUTE: 2 K/UL (ref 1–5.1)
LYMPHOCYTES RELATIVE PERCENT: 13 %
MCH RBC QN AUTO: 26.4 PG (ref 26–34)
MCHC RBC AUTO-ENTMCNC: 32 G/DL (ref 31–36)
MCV RBC AUTO: 82.7 FL (ref 80–100)
MONOCYTES ABSOLUTE: 1.6 K/UL (ref 0–1.3)
MONOCYTES RELATIVE PERCENT: 10.2 %
NEUTROPHILS ABSOLUTE: 11.9 K/UL (ref 1.7–7.7)
NEUTROPHILS RELATIVE PERCENT: 76 %
PDW BLD-RTO: 20.6 % (ref 12.4–15.4)
PERFORMED ON: ABNORMAL
PERFORMED ON: NORMAL
PLATELET # BLD: 137 K/UL (ref 135–450)
PMV BLD AUTO: 8.2 FL (ref 5–10.5)
POTASSIUM SERPL-SCNC: 4.5 MMOL/L (ref 3.5–5.1)
RBC # BLD: 4.44 M/UL (ref 4–5.2)
SODIUM BLD-SCNC: 136 MMOL/L (ref 136–145)
WBC # BLD: 15.7 K/UL (ref 4–11)

## 2022-02-23 PROCEDURE — 97535 SELF CARE MNGMENT TRAINING: CPT

## 2022-02-23 PROCEDURE — 90935 HEMODIALYSIS ONE EVALUATION: CPT

## 2022-02-23 PROCEDURE — 2500000003 HC RX 250 WO HCPCS: Performed by: INTERNAL MEDICINE

## 2022-02-23 PROCEDURE — 94761 N-INVAS EAR/PLS OXIMETRY MLT: CPT

## 2022-02-23 PROCEDURE — 97530 THERAPEUTIC ACTIVITIES: CPT

## 2022-02-23 PROCEDURE — 6360000002 HC RX W HCPCS: Performed by: INTERNAL MEDICINE

## 2022-02-23 PROCEDURE — 6370000000 HC RX 637 (ALT 250 FOR IP): Performed by: INTERNAL MEDICINE

## 2022-02-23 PROCEDURE — 97162 PT EVAL MOD COMPLEX 30 MIN: CPT

## 2022-02-23 PROCEDURE — 36415 COLL VENOUS BLD VENIPUNCTURE: CPT

## 2022-02-23 PROCEDURE — 97166 OT EVAL MOD COMPLEX 45 MIN: CPT

## 2022-02-23 PROCEDURE — 94640 AIRWAY INHALATION TREATMENT: CPT

## 2022-02-23 PROCEDURE — 85025 COMPLETE CBC W/AUTO DIFF WBC: CPT

## 2022-02-23 PROCEDURE — 80048 BASIC METABOLIC PNL TOTAL CA: CPT

## 2022-02-23 PROCEDURE — 1200000000 HC SEMI PRIVATE

## 2022-02-23 RX ORDER — ALPRAZOLAM 3 MG/1
3 TABLET, EXTENDED RELEASE ORAL EVERY MORNING
Qty: 3 TABLET | Refills: 0 | Status: ON HOLD | OUTPATIENT
Start: 2022-02-23 | End: 2022-03-07 | Stop reason: HOSPADM

## 2022-02-23 RX ORDER — BUPROPION HYDROCHLORIDE 150 MG/1
150 TABLET ORAL
Status: DISCONTINUED | OUTPATIENT
Start: 2022-02-25 | End: 2022-02-24 | Stop reason: HOSPADM

## 2022-02-23 RX ORDER — LORAZEPAM 0.5 MG/1
0.5 TABLET ORAL EVERY 4 HOURS PRN
Status: DISCONTINUED | OUTPATIENT
Start: 2022-02-23 | End: 2022-02-24 | Stop reason: HOSPADM

## 2022-02-23 RX ADMIN — ASPIRIN 81 MG: 81 TABLET, COATED ORAL at 14:10

## 2022-02-23 RX ADMIN — HEPARIN SODIUM 5000 UNITS: 5000 INJECTION INTRAVENOUS; SUBCUTANEOUS at 21:11

## 2022-02-23 RX ADMIN — LORAZEPAM 0.5 MG: 0.5 TABLET ORAL at 21:11

## 2022-02-23 RX ADMIN — HEPARIN SODIUM 3600 UNITS: 1000 INJECTION INTRAVENOUS; SUBCUTANEOUS at 11:00

## 2022-02-23 RX ADMIN — ATORVASTATIN CALCIUM 40 MG: 40 TABLET, FILM COATED ORAL at 21:11

## 2022-02-23 RX ADMIN — HEPARIN SODIUM 5000 UNITS: 5000 INJECTION INTRAVENOUS; SUBCUTANEOUS at 14:02

## 2022-02-23 RX ADMIN — Medication 2 PUFF: at 20:00

## 2022-02-23 RX ADMIN — HEPARIN SODIUM 5000 UNITS: 5000 INJECTION INTRAVENOUS; SUBCUTANEOUS at 06:12

## 2022-02-23 RX ADMIN — PROPRANOLOL HYDROCHLORIDE 80 MG: 80 CAPSULE, EXTENDED RELEASE ORAL at 14:10

## 2022-02-23 RX ADMIN — LISINOPRIL 10 MG: 10 TABLET ORAL at 14:10

## 2022-02-23 ASSESSMENT — PAIN SCALES - PAIN ASSESSMENT IN ADVANCED DEMENTIA (PAINAD)
FACIALEXPRESSION: 0
BODYLANGUAGE: 0
FACIALEXPRESSION: 0
NEGVOCALIZATION: 0
FACIALEXPRESSION: 0
NEGVOCALIZATION: 0
TOTALSCORE: 0
BREATHING: 0
CONSOLABILITY: 0
CONSOLABILITY: 0
TOTALSCORE: 0
NEGVOCALIZATION: 0
FACIALEXPRESSION: 0
NEGVOCALIZATION: 0
FACIALEXPRESSION: 0
BODYLANGUAGE: 0
TOTALSCORE: 0
FACIALEXPRESSION: 0
BREATHING: 0
NEGVOCALIZATION: 0
BODYLANGUAGE: 0
CONSOLABILITY: 0
BREATHING: 0
BODYLANGUAGE: 0
BODYLANGUAGE: 0
BREATHING: 0
TOTALSCORE: 0
TOTALSCORE: 0
CONSOLABILITY: 0
BODYLANGUAGE: 0
BODYLANGUAGE: 0
FACIALEXPRESSION: 0
CONSOLABILITY: 0
TOTALSCORE: 0
BREATHING: 0
TOTALSCORE: 0
BREATHING: 0
CONSOLABILITY: 0
BREATHING: 0
NEGVOCALIZATION: 0
NEGVOCALIZATION: 0
CONSOLABILITY: 0

## 2022-02-23 ASSESSMENT — PAIN SCALES - GENERAL
PAINLEVEL_OUTOF10: 0

## 2022-02-23 NOTE — CARE COORDINATION
PT & OT evaluation pending, otherwise referral has been sent to [facility],  awaiting for call back.

## 2022-02-23 NOTE — PLAN OF CARE
Problem: Falls - Risk of:  Goal: Will remain free from falls  Description: Will remain free from falls  2/23/2022 0028 by Abigail Jacksno RN  Outcome: Met This Shift     Problem: Falls - Risk of:  Goal: Absence of physical injury  Description: Absence of physical injury  2/23/2022 0028 by Abigail Jackson RN  Outcome: Met This Shift     Problem: Skin Integrity:  Goal: Will show no infection signs and symptoms  Description: Will show no infection signs and symptoms  2/23/2022 0028 by Abigail Jackson RN  Outcome: Met This Shift     Problem: Skin Integrity:  Goal: Absence of new skin breakdown  Description: Absence of new skin breakdown  2/23/2022 0028 by Abigail Jackson RN  Outcome: Met This Shift     Problem: Neurological  Goal: Maximum potential motor/sensory/cognitive function  2/23/2022 0028 by Abigail Jackson RN  Outcome: Met This Shift     Problem: OXYGENATION/RESPIRATORY FUNCTION  Goal: Patient will maintain patent airway  2/23/2022 0028 by Abigail Jackson RN  Outcome: Met This Shift     Problem: OXYGENATION/RESPIRATORY FUNCTION  Goal: Patient will achieve/maintain normal respiratory rate/effort  Description: Respiratory rate and effort will be within normal limits for the patient  2/23/2022 0028 by Abigail Jackson RN  Outcome: Met This Shift     Problem: HEMODYNAMIC STATUS  Goal: Patient has stable vital signs and fluid balance  2/23/2022 0028 by Abigail Jackson RN  Outcome: Ongoing     Problem: FLUID AND ELECTROLYTE IMBALANCE  Goal: Fluid and electrolyte balance are achieved/maintained  2/23/2022 0028 by Abigail Jackson RN  Outcome: Ongoing     Problem: ACTIVITY INTOLERANCE/IMPAIRED MOBILITY  Goal: Mobility/activity is maintained at optimum level for patient  2/23/2022 0028 by Abigail Jackson RN  Outcome: Not Met This Shift

## 2022-02-23 NOTE — CARE COORDINATION
RIKI received a call back  from Angi at home at [facility], stating that they can accept the patient , she has spoken with  the patient's  Spouse too. She is working on getting  transport to and from , will call with updates. Otherwise patient  will need a Rapid covid -19 test done, HENS completed prior to transferring to the facility. Order for the  rapid covid-19 test has been put in.

## 2022-02-23 NOTE — PROGRESS NOTES
2.  Hyperkalemia, better with hemodialysis. 3.  Metabolic acidosis. Follow with hemodialysis. Better. 4.  History of hypertension. Follow with HD.   5.  Anemia due to CKD. Hemoglobin is acceptable. No need for RIA at  this time. 6.  History of CVA. 7.  History  of diabetes mellitus. Management as per Medicine. 8.  Mental status change-Decrease Bupropion to every 48 hours. F/U management per Medicine. 9.  May need ECF. Social work following.      Audrie Cooks, MD

## 2022-02-23 NOTE — DISCHARGE SUMMARY
[facility] -- Physician Discharge Summary     Brock Rodriguez  1975  MRN: [id number]    Admit Date: 2/20/2022  Discharge Date: No discharge date for patient encounter. Attending MD: Nadir Pillai MD  Discharging MD: Nadir Pillai MD  PCP: Puja Lopez [address] [facility] / [address] [phone]    Admission Diagnosis: Altered mental status [R41.82]  Generalized weakness [R53.1]  Overuse of medication [Z91.14]  Chronic prescription benzodiazepine use [Z79.899]  Hypoglycemia due to type 2 diabetes mellitus ([facility] [E11.649]  Stage 5 chronic kidney disease on chronic dialysis ([facility] [N18.6, Z99.2]  DISCHARGE DIAGNOSIS: same    Full Hospital Problem List:  Active Hospital Problems    Diagnosis Date Noted    Altered mental status [R41.82] 02/20/2022    ESRD (end stage renal disease) on dialysis ([facility] [N18.6, Z99.2] 10/04/2021    HTN (hypertension), benign [I10]     Type 2 diabetes mellitus, with long-term current use of insulin ([facility] [E11.9, Z79.4]     Mixed hyperlipidemia [E78.2]            Hospital Course:  55 y.o. female who presents with fatigue, patient is extremley sleepy, she states that her blood sugar has been running low, she started to feel weak around 4am. Supposedly she had a stroke in August of 2021, but she has numerous bruising to her bilateral legs, she states she has been falling a lot.  She is denying and alcohol or drug use.  However, she does admit to taking her benzodiazepine but she is on Xanax.  It patient remembers having breakfast this morning and has been tired ever since. Milli Gould states she is not been eating very much however she still giving herself insulin.  She denies any nausea vomiting or diarrhea.  No cough, congestion, fever or chills.  No chest pain, chest pain or shortness of breath.  She states she just feels tired, no additional complaints.  She admits to using benzodiazepines, Xanax however, her drug screen is positive for oxycodone's, we tried giving her Narcan to wake her up more, she is denying any use of this medication.       She is extremely debilitated  Cannot take care of herself   states he cannot take care of her    Plans made for SNF    Pt kept on usual HD scheudle    Consults made during Hospitalization:  IP CONSULT TO INTERNAL MEDICINE  IP CONSULT TO [address] [place] [address] TO SOCIAL WORK    Treatment team at time of Discharge: Treatment Team: Attending Provider: Mervin Obando MD; Consulting Physician: Mervin Obando MD; Consulting Physician: Anshul Dunn MD; Utilization Reviewer: Jason Cobb, HAYLEE; : Nessa Madsen RN; Utilization Reviewer: Itzel Jean RN; Respiratory Therapist (Night): Nancy Montanez RCP; Respiratory Therapist (Day): Kristi Quarles RCP; Nursing Student: Humberto Molina; Registered Nurse: Brian Lucas RN    Imaging Results:  CT HEAD WO CONTRAST    Result Date: 2/20/2022  EXAMINATION: CTA OF THE HEAD AND NECK WITH CONTRAST; CT OF THE HEAD WITHOUT CONTRAST 2/20/2022 5:39 pm: TECHNIQUE: CTA of the head and neck was performed with the administration of intravenous contrast. Multiplanar reformatted images are provided for review. MIP images are provided for review. Stenosis of the internal carotid arteries measured using NASCET criteria. Dose modulation, iterative reconstruction, and/or weight based adjustment of the mA/kV was utilized to reduce the radiation dose to as low as reasonably achievable.; CT of the head was performed without the administration of intravenous contrast. Dose modulation, iterative reconstruction, and/or weight based adjustment of the mA/kV was utilized to reduce the radiation dose to as low as reasonably achievable. Noncontrast CT of the head with reconstructed 2-D images are also provided for review. COMPARISON: None.  HISTORY: ORDERING SYSTEM PROVIDED HISTORY: ams FINDINGS: CT HEAD: BRAIN/VENTRICLES: review. MIP images are provided for review. Stenosis of the internal carotid arteries measured using NASCET criteria. Dose modulation, iterative reconstruction, and/or weight based adjustment of the mA/kV was utilized to reduce the radiation dose to as low as reasonably achievable.; CT of the head was performed without the administration of intravenous contrast. Dose modulation, iterative reconstruction, and/or weight based adjustment of the mA/kV was utilized to reduce the radiation dose to as low as reasonably achievable. Noncontrast CT of the head with reconstructed 2-D images are also provided for review. COMPARISON: None. HISTORY: ORDERING SYSTEM PROVIDED HISTORY: ams FINDINGS: CT HEAD: BRAIN/VENTRICLES:  Mild generalized atrophy and changes of mild chronic small vessel ischemic disease are present. No acute intracranial hemorrhage or extraaxial fluid collection. Grey-white differentiation is maintained. No evidence of mass, mass effect or midline shift. No evidence of hydrocephalus. ORBITS: The visualized portion of the orbits demonstrate no acute abnormality. SINUSES:  The visualized paranasal sinuses and mastoid air cells demonstrate no acute abnormality. SOFT TISSUES/SKULL: No acute abnormality of the visualized skull or soft tissues. CTA NECK: AORTIC ARCH/ARCH VESSELS: No dissection or arterial injury. No significant stenosis of the brachiocephalic or subclavian arteries. CAROTID ARTERIES: Mild atherosclerosis in carotid bifurcations. No dissection, arterial injury, or hemodynamically significant stenosis by NASCET criteria. VERTEBRAL ARTERIES: No dissection, arterial injury, or significant stenosis. SOFT TISSUES: The lung apices are clear. No cervical or superior mediastinal lymphadenopathy. The larynx and pharynx are unremarkable. No acute abnormality of the salivary and thyroid glands. BONES: No acute osseous abnormality.  CTA HEAD: ANTERIOR CIRCULATION: No significant stenosis of the intracranial internal carotid, anterior cerebral, or middle cerebral arteries. No aneurysm. POSTERIOR CIRCULATION: No significant stenosis of the vertebral, basilar, or posterior cerebral arteries. No aneurysm. OTHER: No dural venous sinus thrombosis on this non-dedicated study. 1. Age related involutional changes with no acute intracranial abnormality. 2. No flow-limiting arterial stenosis in the head and neck. Discharge Exam: (2-7 system for EPF/Detailed, ?8 for Comprehensive)  BP (!) 170/90   Pulse 88   Temp 98.1 °F (36.7 °C) (Oral)   Resp 18   Ht 5' 7\" (1.702 m)   Wt 147 lb 7.8 oz (66.9 kg)   LMP 02/06/2022 (Approximate)   SpO2 97%   BMI 23.10 kg/m²   Constitutional: vitals as above: alert, appears stated age and cooperative    Psychiatric: normal insight and judgment, oriented to person, place, time, and general circumstances    Head: Normocephalic, without obvious abnormality, atraumatic    Eyes:lids and lashes normal, conjunctivae and sclerae normal and pupils equal, round, reactive to light and accomodation    EMNT: external ears normal, nares midline    Neck: no carotid bruit, supple, symmetrical, trachea midline and thyroid not enlarged, symmetric, no tenderness/mass/nodules     Respiratory: clear to auscultation and percussion bilaterally with normal respiratory effort    Cardiovascular: normal rate, regular rhythm, normal S1 and S2 and no murmurs    Gastrointestinal: soft, non-tender, non-distended, normal bowel sounds, no masses or organomegaly    Extremities: no clubbing, 2+ edema  Skin:No rashes or nodules noted. Neurologic:negative             Disposition: SNF    Condition: stable    Discharge Medications:  [unfilled]       Medication List      START taking these medications     Glucagon Emergency 1 MG Kit; Inject 1 Units as directed as needed (if   blood sugar is less than 60 and you are symptomatic);  Notes to patient:   Use: for low blood sugar Side effects: high blood sugar, irritation at   injection site     CHANGE how you take these medications     Dulaglutide 1.5 MG/0.5ML Sopn; Inject 1.5 mg into the skin once a week; What changed: additional instructions; Notes to patient: Use: for the   treatment of Diabetes Side effects: redness around injection site,   restlessness, chills, loss of appetite     CONTINUE taking these medications     albuterol sulfate  (90 Base) MCG/ACT inhaler; Inhale 2 puffs into   the lungs every 6 hours as needed for Wheezing or Shortness of Breath;   Notes to patient: Albuterol/pratropium (DuoNeb) or Albuterol (Proventil)   Use: to open airways, reduce secretions Side effects: nervousness,   jitteriness, shakiness, headache, fast heartbeat   ALPRAZolam 3 MG extended release tablet; Commonly known as: XANAX XR; Take 1 tablet by mouth every morning for 3 days.; Notes to patient: For:   anxiety Side effects: drowsiness, insomnia, fatigue, constipation   aspirin 81 MG EC tablet; Take 1 tablet by mouth daily; Notes to patient:   Aspirin Use: Prevents heart attacks & strokes. Side effects: bleeding or   bruising more easily, upset stomach.  atorvastatin 40 MG tablet; Commonly known as: LIPITOR; Take 1 tablet by   mouth nightly; Notes to patient:    Atorvastatin (Lipitor®) Use: lower bad   cholesterol  Side effects: headache, muscle pains, constipation, diarrhea.  benzonatate 200 MG capsule; Commonly known as: TESSALON; Take 1 capsule   by mouth every 8 hours as needed for Cough; Notes to patient: Benzonatate   is a non-narcotic cough medicine. It works by numbing the throat and   lungs, making the cough reflex less active. Benzonatate is used to relieve   coughing.   Side effects: headache   Breathe Right Extra Strength Strp; 1 strip by Does not apply route   nightly as needed (nasal congestion)   buPROPion 150 MG extended release tablet; Commonly known as: WELLBUTRIN   XL; Take 1 tablet by mouth every morning; Notes to patient: Antidepressant   Use: Treatment of major depressive disorder, SAD, smoking cessation Side   Effects: Dizziness, fatigue, stomach upset   fluvoxaMINE 100 MG tablet; Commonly known as: Blase Shave; Take 1 tablet by   mouth nightly   glucose 40 % Gel; Commonly known as: GLUTOSE; Take 37.5 mLs by mouth as   needed (low blood sugar)   glycopyrrolate-formoterol 9-4.8 MCG/ACT Aero; Commonly known as: Bevespi   Aerosphere; Inhale 2 puffs into the lungs 2 times daily   hydrOXYzine 25 MG tablet; Commonly known as: ATARAX; Notes to patient:   Use: to treat anxiety, itching, and sometimes nausea Side effects:   drowsiness, dry mouth, restlessness   insulin lispro (1 Unit Dial) 100 UNIT/ML Sopn; Inject 0-6 Units into the   skin 3 times daily (with meals) **Corrective Low Dose Algorithm**   Glucose: Dose:              No Insulin 140-199 1 Unit 200-249 2   Units 250-299 3 Units 300-349 4 Units 350-399 5 Units Over 399 6 Units;   Notes to patient: Use: to lower blood sugar, short acting Side effects:   dizziness/fatigue if blood sugar drops too low   Insulin Pen Needle 29G X 12.7MM Misc; 1 each by Does not apply route   daily   Lantus SoloStar 100 UNIT/ML injection pen; Generic drug: insulin   glargine; Notes to patient: Long acting insulin; keeps blood sugar from   raising really high throughout the day Side effects: low blood sugar   lisinopril 10 MG tablet; Commonly known as: PRINIVIL;ZESTRIL; Take 1   tablet by mouth daily; Notes to patient: Lisinopril is used to treat high   blood pressure, help heart function after a heart attack, help a weak   heart. Side effects: dizziness, headache, cough.  methyl salicylate-menthol 75-95 % Crea;  Apply topically 3 times daily as   needed for Pain   pregabalin 75 MG capsule; Commonly known as: LYRICA; TAKE ONE CAPSULE BY   MOUTH EVERY NIGHT; Notes to patient: Lyrica (Pregabalin) Uses: nerve pain,   seizures Side Effects: swelling, sleepiness, blurry vision, weight gain   propranolol 80 MG extended release capsule; Commonly known as: INDERAL   LA; TAKE ONE CAPSULE BY MOUTH EVERY MORNING; Notes to patient: USE: Beta   Blocker - may aide in reducing tremors; reduces heart rate and blood   pressure  Side effects: lightheadedness, fatigue, slow heart rate   Pulse Oximeter Misc; 1 each by Does not apply route every 6-8 hours as   needed (shortness of breath)   ThermaCare Back/Hip Misc; 1 each by Does not apply route daily as needed   (back pain)         Allergies: Allergies   Allergen Reactions    Amoxicillin Hives, Itching and Other (See Comments)     Tolerates cephalosporins  Patient tolerating cefazolin (ANCEF) as of October 11, 2018      Levofloxacin Anaphylaxis    Vancomycin Anaphylaxis, Hives and Shortness Of Breath    Tape [Adhesive Tape] Other (See Comments)     Paper tape turns skin bright red. Plastic tape okay. Follow up Instructions: Follow-up with PCP: Linnette Hicks in 2 wk . Total time spent on day of discharge including face-to-face visit, examination, documentation, counseling, preparation of discharge plans and followup, and discharge medicine reconciliation and presciptions is 47 minutes      ---       Subjective from day of d/c:   Viet Arteaga is a 55 y.o. female. Pt seen and examined  Chart reviewed since last visit, labs and imaging below      No change  Remains lethargic    She denies chest pain, denies shortness of breath, denies nausea,  denies emesis. 10 system Review of Systems is reviewed with patient, and pertinent positives are noted in HPI above . Otherwise, Review of systems is negative. I have reviewed the patient's medical and social history in detail and updated the computerized patient record.   To recap: She  has a past medical history of Blind in both eyes, Cerebral artery occlusion with cerebral infarction [facility]), CHF (congestive heart failure) ([facility] Chronic kidney disease, Depression, Diabetes mellitus out of control ([facility] Diabetes mellitus, type II ([facility] Diabetic neuropathy associated with type 2 diabetes mellitus ([facility] Generalized headaches, Hypertension, Infertility, Insomnia, Migraine headache, Mixed hyperlipidemia, Otitis media, Pelvic abscess in female, Pneumonia, Stroke [facility]), and [facility] . She  has a past surgical history that includes Cervix surgery; eye surgery; Foot surgery (Right); Foot surgery (Bilateral); Foot surgery (Left); IR TUNNELED CVC PLACE WO SQ PORT/PUMP > 5 YEARS (9/7/2021); and Dialysis fistula creation (Right, 2/10/2022). . She  reports that she has been smoking cigarettes. She has been smoking about 1.00 pack per day. She has never used smokeless tobacco. She reports that she does not drink alcohol and does not use drugs. .      Current Facility-Administered Medications: aspirin EC tablet 81 mg, 81 mg, Oral, Daily  atorvastatin (LIPITOR) tablet 40 mg, 40 mg, Oral, Nightly  buPROPion (WELLBUTRIN XL) extended release tablet 150 mg, 150 mg, Oral, QAM  lisinopril (PRINIVIL;ZESTRIL) tablet 10 mg, 10 mg, Oral, Daily  propranolol (INDERAL LA) extended release capsule 80 mg, 80 mg, Oral, Daily  albuterol sulfate  (90 Base) MCG/ACT inhaler 2 puff, 2 puff, Inhalation, BID  albuterol sulfate  (90 Base) MCG/ACT inhaler 2 puff, 2 puff, Inhalation, Q4H PRN  glucose (GLUTOSE) 40 % oral gel 15 g, 15 g, Oral, PRN  dextrose 50 % IV solution, 12.5 g, IntraVENous, PRN  glucagon (rDNA) injection 1 mg, 1 mg, IntraMUSCular, PRN  dextrose 5 % solution, 100 mL/hr, IntraVENous, PRN  insulin lispro (1 Unit Dial) 0-6 Units, 0-6 Units, SubCUTAneous, TID WC  insulin lispro (1 Unit Dial) 0-3 Units, 0-3 Units, SubCUTAneous, Nightly  heparin (porcine) injection 5,000 Units, 5,000 Units, SubCUTAneous, 3 times per day  heparin (porcine) injection 3,600 Units, 3,600 Units, IntraCATHeter, PRN  labetalol (NORMODYNE;TRANDATE) injection 20 mg, 20 mg, IntraVENous, Q6H PRN  dextrose 50 % IV solution, 25 g, IntraVENous, PRN  naloxone (NARCAN) injection 1 mg, 1 mg, IntraVENous, Once  propranolol (INDERAL LA) extended release capsule 80 mg, 80 mg, Oral, Once         Objective (exam): see above    Labs at time of d/c:  Lab Results   Component Value Date    WBC 15.7 (H) 02/23/2022    HGB 11.7 (L) 02/23/2022    HCT 36.7 02/23/2022     02/23/2022    CHOL 164 02/24/2021    TRIG 319 (H) 08/31/2021    HDL 41 02/24/2021    LDLDIRECT 100 (H) 04/18/2011    ALT <5 (L) 02/20/2022    AST 26 02/20/2022     02/23/2022    K 4.5 02/23/2022    CL 91 (L) 02/23/2022    CREATININE 6.3 (HH) 02/23/2022    BUN 44 (H) 02/23/2022    CO2 23 02/23/2022    INR 0.97 02/07/2022    LABA1C 6.7 01/05/2022    LABMICR YES 02/20/2022     Lab Results   Component Value Date    CKTOTAL 25 (L) 09/13/2021    TROPONINI 0.25 (H) 02/20/2022       (Please note that portions of this note were completed with a voice recognition program.  Efforts were made to edit the dictations but occasionally words are mis-transcribed.)      Signed:  Kasie Garcia MD  2/23/2022

## 2022-02-23 NOTE — DISCHARGE INSTR - COC
Continuity of Care Form    Patient Name: Quique Quinteros   :  1975  MRN:  [id number]    Admit date:  2022  Discharge date:  2022    Code Status Order: Prior   Advance Directives:      Admitting Physician:  Hal Velázquez MD  PCP: Pallavi Armstrong    Discharging Nurse: Sen Manuel [id number] Unit/Room#: 5CC-2331/4626-38  Discharging Unit Phone Number: [phone]    Emergency Contact:   Extended Emergency Contact Information  Primary Emergency Contact: [address] Phone: [phone]  Relation: Spouse    Past Surgical History:  Past Surgical History:   Procedure Laterality Date    CERVIX SURGERY      laser tx for dysplasia;    DIALYSIS FISTULA CREATION Right 2/10/2022    RIGHT BRACHIO CEPHALIC FISTULA CREATION performed by Marilyn Pepper MD at [address] Right     FOOT SURGERY Bilateral     FOOT SURGERY Left     IR TUNNELED CATHETER PLACEMENT GREATER THAN 5 YEARS  2021    IR TUNNELED CATHETER PLACEMENT GREATER THAN 5 YEARS 2021 Collins Steinberg MD [facility] SPECIAL PROCEDURES       Immunization History:   Immunization History   Administered Date(s) Administered    Influenza 2011    Influenza Virus Vaccine 2011, 10/05/2013    Pneumococcal Polysaccharide ([id number]) 2021    Tdap (Boostrix, Adacel) 2018, 2021       Active Problems:  Patient Active Problem List   Diagnosis Code    Type 2 diabetes mellitus, with long-term current use of insulin ([facility] E11.9, Z79.4    Mixed hyperlipidemia E78.2    Migraine headache G43.909    Anemia D64.9    Diabetic foot infection ([address] E11.628, L08.9    Pyogenic inflammation of bone (HCC) M86.9    History of medication noncompliance Z91.14    Osteomyelitis of left foot (HCC) M86.9    Nephrotic syndrome N04.9    Peripheral edema R60.9    Pulmonary edema J81.1    Right sided numbness R20.0    Tobacco dependence F17.200    Chronic kidney disease (CKD), stage III (moderate) ([facility]) N18.30    Chronic diastolic (congestive) heart failure ([facility]) I50.32    History of CVA (cerebrovascular accident) Z86.73    Arterial ischemic stroke, ICA, left, acute ([facility] [address] I63.232    HTN (hypertension), benign I10    DM (diabetes mellitus), secondary, uncontrolled, w/neurologic complic ([facility]) Z27.91, W87.28    Dyslipidemia E78.5    Smoker F17.200    Panic disorder F41.0    Isolated proteinuria R80.0    Acute on chronic diastolic heart failure ([facility]) I50.33    Diabetic peripheral neuropathy ([facility]) E11.42    Acute respiratory failure ([facility]) J96.00    Depression F32. 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, associated with type 2 diabetes mellitus ([facility]) Z67.7051    Cellulitis of right foot L03.115    Hidradenitis suppurativa L73.2    Hypocalcemia E83.51    Non-toxic multinodular goiter E04.2    Polyneuropathy due to type 2 diabetes mellitus ([facility] [address] E11.42    Proliferative diabetic retinopathy associated with type 2 diabetes mellitus ([facility] [address] E11.3599    ESRD (end stage renal disease) on dialysis ([facility] [address] N18.6, Z99.2    Acute respiratory failure with hypoxemia ([facility] [address] J96.01    Acute respiratory failure due to COVID-19 ([facility] [address] U07.1, J96.00    Suspected COVID-19 virus infection Z20.822    Epiglottitis J05.10    Recurrent falls R29.6    Altered mental status R41.82       Isolation/Infection:   Isolation            No Isolation          Patient Infection Status       Infection Onset Added Last Indicated Last Indicated By Review Planned Expiration Resolved Resolved By    None active    Resolved    COVID-19 (Rule Out) 02/07/22 02/07/22 02/07/22 COVID-19 (Ordered)   02/08/22 Rule-Out Test Resulted    COVID-19 01/12/22 01/12/22 01/12/22 COVID-19, Rapid   01/13/22 Roman Lynn RN    PCR negative    C-diff Rule Out 01/05/22 01/06/22 01/05/22 C. difficile toxin Molecular (Ordered)   01/06/22 Rule-Out Test Resulted    C-diff Rule Out 01/05/22 01/05/22 01/05/22 Clostridium difficile toxin/antigen (Ordered)   01/05/22 Rule-Out Test Resulted    COVID-19 (Rule Out) 10/16/21 10/16/21 10/17/21 COVID-19 (Ordered)   10/17/21 Rule-Out Test Resulted    COVID-19 (Rule Out) 10/04/21 10/04/21 10/04/21 COVID-19 (Ordered)   10/05/21 Rule-Out Test Resulted    COVID-19 (Rule Out) 08/27/21 08/27/21 08/27/21 COVID-19 (Ordered)   08/28/21 Rule-Out Test Resulted    COVID-19 (Rule Out) 02/23/21 02/23/21 02/23/21 COVID-19, Rapid (Ordered)   02/23/21 Rule-Out Test Resulted    MRSA 04/22/19 04/25/19 04/22/19 Wound Culture   08/29/20 Yong Gonsalez RN            Nurse Assessment:  Last Vital Signs: BP (!) 170/90   Pulse 88   Temp 98.1 °F (36.7 °C) (Oral)   Resp 18   Ht 5' 7\" (1.702 m)   Wt 147 lb 7.8 oz (66.9 kg)   LMP 02/06/2022 (Approximate)   SpO2 97%   BMI 23.10 kg/m²     Last documented pain score (0-10 scale): Pain Level: 0  Last Weight:   Wt Readings from Last 1 Encounters:   02/23/22 147 lb 7.8 oz (66.9 kg)     Mental Status:  disoriented, Oriented to person only with delayed response     IV Access:  - Dialysis Catheter  - site  right and internal jugular, insertion date: 9/7/21    Nursing Mobility/ADLs:  Walking   Dependent via wheelchair   Transfer  Assisted X2  [address]  Assisted  Med Delivery   crushed and prefers mixed with apple sauce, Can swallow small pills with assistance     Wound Care Documentation and Therapy:        Elimination:  Continence: Bowel: Yes but incontinent at times   Bladder: Yes, Oliguria but incontinent at times   Urinary Catheter: None   Colostomy/Ileostomy/Ileal Conduit: No       Date of Last BM: 02/24/2022    Intake/Output Summary (Last 24 hours) at 2/23/2022 0753  Last data filed at 2/22/2022 2013  Gross per 24 hour   Intake 410 ml   Output 1400 ml   Net -990 ml     I/O last 3 completed shifts:   In: 410 [I.V.:10]  Out: 1400     Safety Concerns:     History of Falls (last 30 days), At Risk for Falls, and Aspiration Risk    Impairments/Disabilities:      Vision, Blind in both eyes     Nutrition Therapy:  Current Nutrition Therapy:   - Oral Diet:  Carb Control 3 carbs/meal (1500kcals/day), Minced and moist     Routes of Feeding: Oral  Liquids: No Restrictions  Daily Fluid Restriction: no  Last Modified Barium Swallow with Video (Video Swallowing Test): not done    Treatments at the Time of Hospital Discharge:   Respiratory Treatments: Oxygen via nasal cannula as needed   Oxygen Therapy:  is not on home oxygen therapy. Ventilator:    - No ventilator support    Rehab Therapies: Physical Therapy, Occupational Therapy, and Speech/Language Therapy  Weight Bearing Status/Restrictions: No weight bearing restirctions  Other Medical Equipment (for information only, NOT a DME order):  bedside commode  Other Treatments: None    Patient's personal belongings (please select all that are sent with patient):  None    RN SIGNATURE:  Electronically signed by Frances Montes on 2/24/22 at 2:01 PM EST    CASE MANAGEMENT/SOCIAL WORK SECTION    Inpatient Status Date: 02/20/22    Readmission Risk Assessment Score:  Readmission Risk              Risk of Unplanned Readmission:  56           Discharging to Facility/ Agency   Name:  [facility]  Address:  [address] [place] [address]  Phone:  [phone]  Fax:  [phone]        / signature: Electronically signed by LEYDI Medina, RODRÍGUEZW on 2/24/22 at 2:16 PM EST    PHYSICIAN SECTION    Prognosis: Guarded    Condition at Discharge: Stable    Rehab Potential (if transferring to Rehab): Good    Recommended Labs or Other Treatments After Discharge:  HD 3x/wk    Physician Certification: I certify the above information and transfer of Kale Laguerre  is necessary for the continuing treatment of the diagnosis listed and that she requires [facility] for greater 30 days. Update Admission H&P: No change in H&P    PHYSICIAN SIGNATURE:  Electronically signed by Nicolette Haley MD on 2/23/22 at 7:53 AM EST

## 2022-02-23 NOTE — PROGRESS NOTES
Speech Language Pathology  Attempt Note    Nancy Magallanes  1975    SLP attempted to see pt this date for dysphagia intervention. Chart reviewed, spoke with RN. Pt currently off unit for HD. Will attempt again as pt and therapy schedules allow.      Stefano Narvaez, [address] [facility]  Speech-Language Pathologist  [phone]

## 2022-02-23 NOTE — PROGRESS NOTES
Occupational Therapy   Occupational Therapy Initial Assessment  Date: 2022   Patient Name: Donte Raygoza  MRN: [id number]     : 1975    Date of Service: 2022    Discharge Recommendations:    Donte Raygoza scored a 9/24 on the AM-PAC ADL Inpatient form. Current research shows that an AM-PAC score of 17 or less is typically not associated with a discharge to the patient's home setting. Based on the patient's AM-PAC score and their current ADL deficits, it is recommended that the patient have 3-5 sessions per week of Occupational Therapy at d/c to increase the patient's independence. Please see assessment section for further patient specific details. If patient discharges prior to next session this note will serve as a discharge summary. Please see below for the latest assessment towards goals. OT Equipment Recommendations  Equipment Needed: No  Other: defer to next level of care    Assessment   Performance deficits / Impairments: Decreased functional mobility ; Decreased ADL status; Decreased strength;Decreased safe awareness;Decreased cognition;Decreased balance;Decreased endurance;Decreased vision/visual deficit; Decreased posture  Assessment: Pt is currently functioning below occupational baseline and demo the deficits listed above, pt would benefit from continued skilled OT services to address these deficits and increase IND, safety, and ease with all occupational purusits.   Treatment Diagnosis: Decreased ADL status, functional mobility, and functional transfers d/t Altered mental status  Prognosis: Fair  Decision Making: Medium Complexity  OT Education: OT Role;Plan of Care  Patient Education: nino d/c recommendations- pt will require reinforcement  Barriers to Learning: cognition  REQUIRES OT FOLLOW UP: Yes  Activity Tolerance  Activity Tolerance: Treatment limited secondary to decreased cognition;Patient limited by fatigue  Safety Devices  Safety Devices in place: Yes  Type of However, she does admit to taking her benzodiazepine but she is on Xanax. It patient remembers having breakfast this morning and has been tired ever since. She states she is not been eating very much however she still giving herself insulin. She denies any nausea vomiting or diarrhea. No cough, congestion, fever or chills. No chest pain, chest pain or shortness of breath. She states she just feels tired, no additional complaints, no additional aggravating relieving factors. The patient presents awake, alert however sleepy    Subjective   General  Chart Reviewed: Yes  Patient assessed for rehabilitation services?: Yes  Additional Pertinent Hx: PMH: Blind in both eyes, Cerebral artery occlusion with cerebral infarction ([address] CHF (congestive heart failure) ([address] Chronic kidney disease, Depression, Diabetes mellitus out of control ([address] Diabetes mellitus, type II ([address] Diabetic neuropathy associated with type 2 diabetes mellitus ([address] Generalized headaches, Hypertension, Infertility, Insomnia, Migraine headache (11/09/2011), Mixed hyperlipidemia, Otitis media, Pelvic abscess in female (10/05/2013), Pneumonia, Stroke ([address] (08/27/2020), and Stroke ([address] (08/27/2021).   Family / Caregiver Present: No  Referring Practitioner: Gemini Euceda MD  Diagnosis: Altered mental status  Subjective  Subjective: Pt supine in bed upon arrival, confused and slow to process but agreeable to OT evaluation and treat  Patient Currently in Pain: No  Pain Assessment    Social/Functional History  Social/Functional History  Lives With: Spouse  Type of Home:  (condo)  Home Layout: One level  Home Access: Stairs to enter without rails  Entrance Stairs - Number of Steps: 1 inch lip to get into sliding door  Bathroom Shower/Tub: Shower chair with back,Walk-in shower  Bathroom Toilet: Standard  Bathroom Equipment: Grab bars in shower  Home Equipment: Rolling walker  ADL Assistance: Needs assistance (spouse does IADLs)  Homemaking Responsibilities: No  Ambulation Assistance: Needs assistance (spouse assists with gait belt and RW, however, lately has not been able to safely complete)  Transfer Assistance: Needs assistance (needs assistance for all transfers)  Active : No  Patient's  Info:  drives  Additional Comments: Pt is a poor historian. Information collected from spouse via phone. Pt's spouse reports that pt has been declining over the past 6 months. Pt 6 months ago was falling 1-3 times a day, however, prior to this hospitalization pt was falling 10x per day. Spouse states pt has poor safety awareness and that he could not leave to get pt water without her trying to get up and falling. Objective   Vision: Impaired  Vision Exceptions: Legally blind  Hearing: Within functional limits    Orientation  Overall Orientation Status: Impaired  Orientation Level: Oriented to person;Disoriented to place; Disoriented to time;Disoriented to situation  Observation/Palpation  Posture: Poor (rounded shoulders, forward flexed)  Balance  Sitting Balance: Maximum assistance (varied CGA-MAX(A) w/ 1 LOB laterally to left)  Standing Balance: Maximum assistance (x2 partial stands)  Standing Balance  Time: 1 minute  Activity: x3 partial stands with max(A)x1 to complete  Comment: unable to complete additional standing tasks d/t safety concerns and decreased command following  Functional Mobility  Functional Mobility Comments: unable to assess d/t safety concerns  ADL  UE Dressing: Maximum assistance (gown change)  LE Dressing: Dependent/Total (assistx2 while rolling in bed for brief change)  Toileting: Dependent/Total  Additional Comments: Pt declined additional ADLs, increased assistance needed for all ADLs d/t decreased command following  Tone RUE  RUE Tone: Normotonic  Tone LUE  LUE Tone: Normotonic  Coordination  Movements Are Fluid And Coordinated: Yes  Bed mobility  Rolling to Left: Moderate assistance  Rolling to Right: Moderate assistance  Supine to Sit: Maximum assistance;2 Person assistance  Sit to Supine: Maximum assistance;2 Person assistance  Scooting: Contact guard assistance;Maximal assistance;2 Person assistance (CGA EOB, max A x2 supine in bed)  Comment: rolling performed supine in bed to perform dependent pericare and brief change  Transfers  Sit to stand: Maximum assistance  Stand to sit: Maximum assistance  Transfer Comments: x2 partial stands EOB  Cognition  Overall Cognitive Status: Exceptions  Arousal/Alertness: Delayed responses to stimuli;Inconsistent responses to stimuli  Following Commands: Inconsistently follows commands  Attention Span: Difficulty attending to directions  Memory: Decreased short term memory;Decreased recall of recent events  Safety Judgement: Decreased awareness of need for assistance;Decreased awareness of need for safety  Problem Solving: Assistance required to identify errors made;Assistance required to generate solutions;Assistance required to implement solutions;Decreased awareness of errors;Assistance required to correct errors made  Insights: Decreased awareness of deficits  Initiation: Requires cues for all  Sequencing: Requires cues for all  Cognition Comment: increased time for processing, delayed and inconsistent responses, decreased command following throughout  Perception  Overall Perceptual Status: Impaired  Unilateral Attention: Cues to maintain midline in standing  Initiation: Hand over hand to initiate tasks  Motor Planning: Hand over hand to sequence tasks  Sensation  Overall Sensation Status:  (unable to formally assess due to cognitive status)         Plan   Plan  Times per week: 3-5x/wk  Times per day: Daily  Current Treatment Recommendations: Strengthening,Balance Training,Functional Mobility Training,Endurance Training,Self-Care / ADL,Safety Education & Training,Cognitive Reorientation,Patient/Caregiver Education & Training,Cognitive/Perceptual Training    G-Code     OutComes Score                                                  AM-PAC Score        AM-PAC Inpatient Daily Activity Raw Score: 9 (02/23/22 1525)  AM-PAC Inpatient ADL T-Scale Score : 25.33 (02/23/22 1525)  ADL Inpatient CMS 0-100% Score: 79.59 (02/23/22 1525)  ADL Inpatient CMS G-Code Modifier : CL (02/23/22 1525)    Goals  Short term goals  Time Frame for Short term goals: d/c  Short term goal 1: Pt will complete bed mobility with min(A)x1  Short term goal 2: Pt will complete LB ADLs with max(A)x1  Short term goal 3: Pt will complete UB ADLs with min(A)  Short term goal 4: Pt will complete toileting with max(A)x1  Short term goal 5: Pt will complete functional transfer to ADL surface with mod(A)x1  Long term goals  Time Frame for Long term goals : LTG=STG  Patient Goals   Patient goals : pt did not state       Therapy Time   Individual Concurrent Group Co-treatment   Time In 1430         Time Out 1508         Minutes 38         Timed Code Treatment Minutes: 12 [facility], [address] [place] [id number]

## 2022-02-23 NOTE — FLOWSHEET NOTE
Occupational/Physical Therapy  Mary Stephens    Orders received and chart reviewed, attempted to see pt for OT/PT evaluation, however, pt is currently DAWNA for HD. Will continue to monitor pt and check back as schedule allows and time permits.     Thanks,    Giuseppe Garcia, OTR/L [id number]  Christiano Marcos, [place], [facility]

## 2022-02-23 NOTE — CARE COORDINATION
RIKI spoke with Freddy Greene at [facility] are able to accept patient and can provide in house hemodialysis. RIKI received a call from the  Nephrologist -Dr Romero July stating that he prefers   patient not  going  to [facility], he is concerned about the HD care at that facility. He is okay with patient going to any other facility where patient can be transported to  a [facility] HD location or a facility that [facility] can provide the HD treatments. RIKI spoke with patient's spouse- Kyung Turcios who reported that patient has been to Home at [facility] before. He also confirmed that patient is able to transport via wheel chair to and from HD treatments, that is how he was transporting her . Referral was sent to the facility and [facility] in admissions will call back with updates.

## 2022-02-24 VITALS
HEIGHT: 67 IN | HEART RATE: 78 BPM | WEIGHT: 146.16 LBS | BODY MASS INDEX: 22.94 KG/M2 | DIASTOLIC BLOOD PRESSURE: 81 MMHG | SYSTOLIC BLOOD PRESSURE: 151 MMHG | OXYGEN SATURATION: 96 % | RESPIRATION RATE: 14 BRPM | TEMPERATURE: 97.6 F

## 2022-02-24 LAB
ANION GAP SERPL CALCULATED.3IONS-SCNC: 17 MMOL/L (ref 3–16)
BASOPHILS ABSOLUTE: 0 K/UL (ref 0–0.2)
BASOPHILS RELATIVE PERCENT: 0.3 %
BUN BLDV-MCNC: 43 MG/DL (ref 7–20)
CALCIUM SERPL-MCNC: 9.3 MG/DL (ref 8.3–10.6)
CHLORIDE BLD-SCNC: 92 MMOL/L (ref 99–110)
CO2: 26 MMOL/L (ref 21–32)
CREAT SERPL-MCNC: 5.1 MG/DL (ref 0.6–1.1)
EOSINOPHILS ABSOLUTE: 0.1 K/UL (ref 0–0.6)
EOSINOPHILS RELATIVE PERCENT: 0.6 %
GFR AFRICAN AMERICAN: 11
GFR NON-AFRICAN AMERICAN: 9
GLUCOSE BLD-MCNC: 102 MG/DL (ref 70–99)
GLUCOSE BLD-MCNC: 118 MG/DL (ref 70–99)
GLUCOSE BLD-MCNC: 163 MG/DL (ref 70–99)
GLUCOSE BLD-MCNC: 93 MG/DL (ref 70–99)
HCT VFR BLD CALC: 36.7 % (ref 36–48)
HEMOGLOBIN: 11.8 G/DL (ref 12–16)
LYMPHOCYTES ABSOLUTE: 1.4 K/UL (ref 1–5.1)
LYMPHOCYTES RELATIVE PERCENT: 11.4 %
MCH RBC QN AUTO: 26.7 PG (ref 26–34)
MCHC RBC AUTO-ENTMCNC: 32.2 G/DL (ref 31–36)
MCV RBC AUTO: 82.9 FL (ref 80–100)
MONOCYTES ABSOLUTE: 1.1 K/UL (ref 0–1.3)
MONOCYTES RELATIVE PERCENT: 8.7 %
NEUTROPHILS ABSOLUTE: 10 K/UL (ref 1.7–7.7)
NEUTROPHILS RELATIVE PERCENT: 79 %
PDW BLD-RTO: 20.2 % (ref 12.4–15.4)
PERFORMED ON: ABNORMAL
PERFORMED ON: ABNORMAL
PERFORMED ON: NORMAL
PLATELET # BLD: 126 K/UL (ref 135–450)
PMV BLD AUTO: 8 FL (ref 5–10.5)
POTASSIUM SERPL-SCNC: 4.1 MMOL/L (ref 3.5–5.1)
RBC # BLD: 4.43 M/UL (ref 4–5.2)
SARS-COV-2, NAAT: NOT DETECTED
SODIUM BLD-SCNC: 135 MMOL/L (ref 136–145)
WBC # BLD: 12.7 K/UL (ref 4–11)

## 2022-02-24 PROCEDURE — 85025 COMPLETE CBC W/AUTO DIFF WBC: CPT

## 2022-02-24 PROCEDURE — 80048 BASIC METABOLIC PNL TOTAL CA: CPT

## 2022-02-24 PROCEDURE — 6370000000 HC RX 637 (ALT 250 FOR IP): Performed by: INTERNAL MEDICINE

## 2022-02-24 PROCEDURE — 6360000002 HC RX W HCPCS: Performed by: INTERNAL MEDICINE

## 2022-02-24 PROCEDURE — 36415 COLL VENOUS BLD VENIPUNCTURE: CPT

## 2022-02-24 PROCEDURE — 87635 SARS-COV-2 COVID-19 AMP PRB: CPT

## 2022-02-24 RX ADMIN — HEPARIN SODIUM 5000 UNITS: 5000 INJECTION INTRAVENOUS; SUBCUTANEOUS at 14:31

## 2022-02-24 RX ADMIN — LISINOPRIL 10 MG: 10 TABLET ORAL at 08:23

## 2022-02-24 RX ADMIN — PROPRANOLOL HYDROCHLORIDE 80 MG: 80 CAPSULE, EXTENDED RELEASE ORAL at 08:24

## 2022-02-24 RX ADMIN — LORAZEPAM 0.5 MG: 0.5 TABLET ORAL at 08:23

## 2022-02-24 RX ADMIN — ASPIRIN 81 MG: 81 TABLET, COATED ORAL at 08:23

## 2022-02-24 ASSESSMENT — PAIN SCALES - PAIN ASSESSMENT IN ADVANCED DEMENTIA (PAINAD)
TOTALSCORE: 0
NEGVOCALIZATION: 0
BODYLANGUAGE: 0
FACIALEXPRESSION: 0
TOTALSCORE: 0
CONSOLABILITY: 0
BREATHING: 0
NEGVOCALIZATION: 0
BODYLANGUAGE: 0
NEGVOCALIZATION: 0
BREATHING: 0
BREATHING: 0
FACIALEXPRESSION: 0
TOTALSCORE: 0
CONSOLABILITY: 0
FACIALEXPRESSION: 0
BODYLANGUAGE: 0
CONSOLABILITY: 0

## 2022-02-24 ASSESSMENT — PAIN SCALES - GENERAL
PAINLEVEL_OUTOF10: 0

## 2022-02-24 NOTE — PROGRESS NOTES
[facility] Note    Patient Active Problem List   Diagnosis    Type 2 diabetes mellitus, with long-term current use of insulin (HCC)    Mixed hyperlipidemia    Migraine headache    Anemia    Diabetic foot infection ([address]    Pyogenic inflammation of bone ([address]    History of medication noncompliance    Osteomyelitis of left foot (HCC)    Nephrotic syndrome    Peripheral edema    Pulmonary edema    Right sided numbness    Tobacco dependence    Chronic kidney disease (CKD), stage III (moderate) ([facility]    Chronic diastolic (congestive) heart failure ([facility])    History of CVA (cerebrovascular accident)    Arterial ischemic stroke, ICA, left, acute ([address]    HTN (hypertension), benign    DM (diabetes mellitus), secondary, uncontrolled, w/neurologic complic ([address]    Dyslipidemia    Smoker    Panic disorder    Isolated proteinuria    Acute on chronic diastolic heart failure (HCC)    Diabetic peripheral neuropathy (HCC)    Acute respiratory failure ([facility])    Depression    Abnormal gait    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, associated with type 2 diabetes mellitus ([address]    Cellulitis of right foot    Hidradenitis suppurativa    Hypocalcemia    Non-toxic multinodular goiter    Polyneuropathy due to type 2 diabetes mellitus (HCC)    Proliferative diabetic retinopathy associated with type 2 diabetes mellitus ([address]    ESRD (end stage renal disease) on dialysis ([address]    Acute respiratory failure with hypoxemia ([address]    Acute respiratory failure due to COVID-19 ([address]    Suspected COVID-19 virus infection    Epiglottitis    Recurrent falls    Altered mental status       Past Medical History:   has a past medical history of Blind in both eyes, Cerebral artery occlusion with cerebral infarction ([address] CHF (congestive heart failure) ([address] Chronic kidney disease, Depression, Diabetes mellitus out of control ([facility] [address] Diabetes mellitus, type II ([facility] [address] Diabetic neuropathy associated with type 2 diabetes mellitus ([facility] [address] Generalized headaches, Hypertension, Infertility, Insomnia, Migraine headache, Mixed hyperlipidemia, Otitis media, Pelvic abscess in female, Pneumonia, Stroke [facility]), and Stroke ([facility] Past Social History:   reports that she has been smoking cigarettes. She has been smoking about 1.00 pack per day. She has never used smokeless tobacco. She reports that she does not drink alcohol and does not use drugs. Subjective:  Confusion better today. Tolerated HD well yesterday. Review of Systems confused    Objective:      BP (!) 188/99   Pulse 81   Temp 97.6 °F (36.4 °C) (Oral)   Resp 14   Ht 5' 7\" (1.702 m)   Wt 146 lb 2.6 oz (66.3 kg)   LMP 02/06/2022 (Approximate)   SpO2 96%   BMI 22.89 kg/m²     Wt Readings from Last 3 Encounters:   02/24/22 146 lb 2.6 oz (66.3 kg)   02/10/22 178 lb (80.7 kg)   01/21/22 189 lb 2.5 oz (85.8 kg)       BP Readings from Last 3 Encounters:   02/24/22 (!) 188/99   02/10/22 139/70   02/10/22 107/66       Chest- clear  Heart-regular  Abd-soft  Ext- no edema    Labs  Hemoglobin   Date Value Ref Range Status   02/24/2022 11.8 (L) 12.0 - 16.0 g/dL Final     Hematocrit   Date Value Ref Range Status   02/24/2022 36.7 36.0 - 48.0 % Final     WBC   Date Value Ref Range Status   02/24/2022 12.7 (H) 4.0 - 11.0 K/uL Final     Platelets   Date Value Ref Range Status   02/24/2022 126 (L) 135 - 450 K/uL Final     Lab Results   Component Value Date    CREATININE 5.1 (HH) 02/24/2022    BUN 43 (H) 02/24/2022     (L) 02/24/2022    K 4.1 02/24/2022    CL 92 (L) 02/24/2022    CO2 26 02/24/2022       Assessment/Plan:  1. ESRD. Outpatient hemodialysis Mondays, Wednesdays, Fridays at  [facility]. Missed multiple hemodialysis sessions. Was coming from home with  as primary caregiver. HD tomorrow. Euvolemic.    2. Hyperkalemia, better with hemodialysis. 3.  Metabolic acidosis. Follow with hemodialysis. Better. 4.  History of hypertension. Follow with HD as outpatient. On Lisinopril and Propanolol. 5.  Anemia due to CKD. Hemoglobin is acceptable. No need for IRA at  this time. 6.  History of CVA. 7.  History  of diabetes mellitus. Management as per Medicine. 8.  Mental status change-Decreased Bupropion to every 48 hours. Further management per Medicine. 9.  For D/C to [facility]. Agree with D/C today.      Jamilah Gonzalez MD

## 2022-02-24 NOTE — DISCHARGE SUMMARY
[facility] -- Physician Discharge Summary     Donte Raygoza  1975  MRN: [id number]    Admit Date: 2/20/2022  Discharge Date: No discharge date for patient encounter. Attending MD: Shefali Maier MD  Discharging MD: Shefali Maier MD  PCP: Uzma Zapata [address] [facility] / [address] [phone]    Admission Diagnosis: Altered mental status [R41.82]  Generalized weakness [R53.1]  Overuse of medication [Z91.14]  Chronic prescription benzodiazepine use [Z79.899]  Hypoglycemia due to type 2 diabetes mellitus ([facility] [E11.649]  Stage 5 chronic kidney disease on chronic dialysis ([facility] [N18.6, Z99.2]  DISCHARGE DIAGNOSIS: same    Full Hospital Problem List:  Active Hospital Problems    Diagnosis Date Noted    Altered mental status [R41.82] 02/20/2022    ESRD (end stage renal disease) on dialysis ([facility] [N18.6, Z99.2] 10/04/2021    HTN (hypertension), benign [I10]     Type 2 diabetes mellitus, with long-term current use of insulin ([facility] [E11.9, Z79.4]     Mixed hyperlipidemia [E78.2]            Hospital Course:  55 y.o. female who presents with fatigue, patient is extremley sleepy, she states that her blood sugar has been running low, she started to feel weak around 4am. Supposedly she had a stroke in August of 2021, but she has numerous bruising to her bilateral legs, she states she has been falling a lot.  She is denying and alcohol or drug use.  However, she does admit to taking her benzodiazepine but she is on Xanax.  It patient remembers having breakfast this morning and has been tired ever since. rPema Mancuso states she is not been eating very much however she still giving herself insulin.  She denies any nausea vomiting or diarrhea.  No cough, congestion, fever or chills.  No chest pain, chest pain or shortness of breath.  She states she just feels tired, no additional complaints.  She admits to using benzodiazepines, Xanax however, her drug screen is positive for oxycodone's, we tried giving her Narcan to wake her up more, she is denying any use of this medication.       She is extremely debilitated  Cannot take care of herself   states he cannot take care of her    Plans made for SNF    Pt kept on usual HD scheudle    D/c originally placed 2/23, but issues getting placement  D/c postponed until 2/24  Pt to transfer to [address] [place] [address] made during Hospitalization:  IP CONSULT TO INTERNAL MEDICINE  IP CONSULT TO [address] [place] [address] TO SOCIAL WORK    Treatment team at time of Discharge: Treatment Team: Attending Provider: Arturo Crump MD; Consulting Physician: Arturo Crump MD; Consulting Physician: Chiki Ortiz MD; Utilization Reviewer: Hay Roper RN; : Young Lujan RN; Utilization Reviewer: Simran Ozuna RN; Respiratory Therapist (Day): Sybil Mckenzie RCP; Registered Nurse: Francisca Smith RN; Nursing Student: Marek Ang; Occupational Therapist: Gregory Chapman OT    Imaging Results:  CT HEAD WO CONTRAST    Result Date: 2/20/2022  EXAMINATION: CTA OF THE HEAD AND NECK WITH CONTRAST; CT OF THE HEAD WITHOUT CONTRAST 2/20/2022 5:39 pm: TECHNIQUE: CTA of the head and neck was performed with the administration of intravenous contrast. Multiplanar reformatted images are provided for review. MIP images are provided for review. Stenosis of the internal carotid arteries measured using NASCET criteria. Dose modulation, iterative reconstruction, and/or weight based adjustment of the mA/kV was utilized to reduce the radiation dose to as low as reasonably achievable.; CT of the head was performed without the administration of intravenous contrast. Dose modulation, iterative reconstruction, and/or weight based adjustment of the mA/kV was utilized to reduce the radiation dose to as low as reasonably achievable.  Noncontrast CT of the head with reconstructed 2-D images are also provided for review. COMPARISON: None. HISTORY: ORDERING SYSTEM PROVIDED HISTORY: ams FINDINGS: CT HEAD: BRAIN/VENTRICLES:  Mild generalized atrophy and changes of mild chronic small vessel ischemic disease are present. No acute intracranial hemorrhage or extraaxial fluid collection. Grey-white differentiation is maintained. No evidence of mass, mass effect or midline shift. No evidence of hydrocephalus. ORBITS: The visualized portion of the orbits demonstrate no acute abnormality. SINUSES:  The visualized paranasal sinuses and mastoid air cells demonstrate no acute abnormality. SOFT TISSUES/SKULL: No acute abnormality of the visualized skull or soft tissues. CTA NECK: AORTIC ARCH/ARCH VESSELS: No dissection or arterial injury. No significant stenosis of the brachiocephalic or subclavian arteries. CAROTID ARTERIES: Mild atherosclerosis in carotid bifurcations. No dissection, arterial injury, or hemodynamically significant stenosis by NASCET criteria. VERTEBRAL ARTERIES: No dissection, arterial injury, or significant stenosis. SOFT TISSUES: The lung apices are clear. No cervical or superior mediastinal lymphadenopathy. The larynx and pharynx are unremarkable. No acute abnormality of the salivary and thyroid glands. BONES: No acute osseous abnormality. CTA HEAD: ANTERIOR CIRCULATION: No significant stenosis of the intracranial internal carotid, anterior cerebral, or middle cerebral arteries. No aneurysm. POSTERIOR CIRCULATION: No significant stenosis of the vertebral, basilar, or posterior cerebral arteries. No aneurysm. OTHER: No dural venous sinus thrombosis on this non-dedicated study. 1. Age related involutional changes with no acute intracranial abnormality. 2. No flow-limiting arterial stenosis in the head and neck.      CTA HEAD NECK W CONTRAST    Result Date: 2/20/2022  EXAMINATION: CTA OF THE HEAD AND NECK WITH CONTRAST; CT OF THE HEAD WITHOUT CONTRAST 2/20/2022 5:39 pm: TECHNIQUE: CTA of the head and neck was performed with the administration of intravenous contrast. Multiplanar reformatted images are provided for review. MIP images are provided for review. Stenosis of the internal carotid arteries measured using NASCET criteria. Dose modulation, iterative reconstruction, and/or weight based adjustment of the mA/kV was utilized to reduce the radiation dose to as low as reasonably achievable.; CT of the head was performed without the administration of intravenous contrast. Dose modulation, iterative reconstruction, and/or weight based adjustment of the mA/kV was utilized to reduce the radiation dose to as low as reasonably achievable. Noncontrast CT of the head with reconstructed 2-D images are also provided for review. COMPARISON: None. HISTORY: ORDERING SYSTEM PROVIDED HISTORY: ams FINDINGS: CT HEAD: BRAIN/VENTRICLES:  Mild generalized atrophy and changes of mild chronic small vessel ischemic disease are present. No acute intracranial hemorrhage or extraaxial fluid collection. Grey-white differentiation is maintained. No evidence of mass, mass effect or midline shift. No evidence of hydrocephalus. ORBITS: The visualized portion of the orbits demonstrate no acute abnormality. SINUSES:  The visualized paranasal sinuses and mastoid air cells demonstrate no acute abnormality. SOFT TISSUES/SKULL: No acute abnormality of the visualized skull or soft tissues. CTA NECK: AORTIC ARCH/ARCH VESSELS: No dissection or arterial injury. No significant stenosis of the brachiocephalic or subclavian arteries. CAROTID ARTERIES: Mild atherosclerosis in carotid bifurcations. No dissection, arterial injury, or hemodynamically significant stenosis by NASCET criteria. VERTEBRAL ARTERIES: No dissection, arterial injury, or significant stenosis. SOFT TISSUES: The lung apices are clear. No cervical or superior mediastinal lymphadenopathy. The larynx and pharynx are unremarkable. No acute abnormality of the salivary and thyroid glands. BONES: No acute osseous abnormality. CTA HEAD: ANTERIOR CIRCULATION: No significant stenosis of the intracranial internal carotid, anterior cerebral, or middle cerebral arteries. No aneurysm. POSTERIOR CIRCULATION: No significant stenosis of the vertebral, basilar, or posterior cerebral arteries. No aneurysm. OTHER: No dural venous sinus thrombosis on this non-dedicated study. 1. Age related involutional changes with no acute intracranial abnormality. 2. No flow-limiting arterial stenosis in the head and neck. Discharge Exam: (2-7 system for EPF/Detailed, ?8 for Comprehensive)  BP (!) 188/99   Pulse 81   Temp 97.6 °F (36.4 °C) (Oral)   Resp 14   Ht 5' 7\" (1.702 m)   Wt 146 lb 2.6 oz (66.3 kg)   LMP 02/06/2022 (Approximate)   SpO2 96%   BMI 22.89 kg/m²   Constitutional: vitals as above: alert, appears stated age and cooperative    Psychiatric: normal insight and judgment, oriented to person, place, time, and general circumstances    Head: Normocephalic, without obvious abnormality, atraumatic    Eyes:lids and lashes normal, conjunctivae and sclerae normal and pupils equal, round, reactive to light and accomodation    EMNT: external ears normal, nares midline    Neck: no carotid bruit, supple, symmetrical, trachea midline and thyroid not enlarged, symmetric, no tenderness/mass/nodules     Respiratory: clear to auscultation and percussion bilaterally with normal respiratory effort    Cardiovascular: normal rate, regular rhythm, normal S1 and S2 and no murmurs    Gastrointestinal: soft, non-tender, non-distended, normal bowel sounds, no masses or organomegaly    Extremities: no clubbing, 2+ edema  Skin:No rashes or nodules noted. Neurologic:negative             Disposition: SNF    Condition: stable    Discharge Medications:       Medication List      START taking these medications     Glucagon Emergency 1 MG Kit;  Inject 1 Units as directed as needed (if   blood sugar is less than 60 and you are symptomatic); Notes to patient:   Use: for low blood sugar Side effects: high blood sugar, irritation at   injection site     CHANGE how you take these medications     Dulaglutide 1.5 MG/0.5ML Sopn; Inject 1.5 mg into the skin once a week; What changed: additional instructions; Notes to patient: Use: for the   treatment of Diabetes Side effects: redness around injection site,   restlessness, chills, loss of appetite     CONTINUE taking these medications     albuterol sulfate  (90 Base) MCG/ACT inhaler; Inhale 2 puffs into   the lungs every 6 hours as needed for Wheezing or Shortness of Breath;   Notes to patient: Albuterol/pratropium (DuoNeb) or Albuterol (Proventil)   Use: to open airways, reduce secretions Side effects: nervousness,   jitteriness, shakiness, headache, fast heartbeat   ALPRAZolam 3 MG extended release tablet; Commonly known as: XANAX XR; Take 1 tablet by mouth every morning for 3 days.; Notes to patient: For:   anxiety Side effects: drowsiness, insomnia, fatigue, constipation   aspirin 81 MG EC tablet; Take 1 tablet by mouth daily; Notes to patient:   Aspirin Use: Prevents heart attacks & strokes. Side effects: bleeding or   bruising more easily, upset stomach.  atorvastatin 40 MG tablet; Commonly known as: LIPITOR; Take 1 tablet by   mouth nightly; Notes to patient:    Atorvastatin (Lipitor®) Use: lower bad   cholesterol  Side effects: headache, muscle pains, constipation, diarrhea.  benzonatate 200 MG capsule; Commonly known as: TESSALON; Take 1 capsule   by mouth every 8 hours as needed for Cough; Notes to patient: Benzonatate   is a non-narcotic cough medicine. It works by numbing the throat and   lungs, making the cough reflex less active. Benzonatate is used to relieve   coughing.   Side effects: headache   Breathe Right Extra Strength Strp; 1 strip by Does not apply route   nightly as needed (nasal congestion)   buPROPion 150 MG extended release tablet; Commonly known as: WELLBUTRIN   XL; Take 1 tablet by mouth every morning; Notes to patient: Antidepressant   Use: Treatment of major depressive disorder, SAD, smoking cessation Side   Effects: Dizziness, fatigue, stomach upset   fluvoxaMINE 100 MG tablet; Commonly known as: LUVOX; Take 1 tablet by   mouth nightly   glucose 40 % Gel; Commonly known as: GLUTOSE; Take 37.5 mLs by mouth as   needed (low blood sugar)   glycopyrrolate-formoterol 9-4.8 MCG/ACT Aero; Commonly known as: Bevespi   Aerosphere; Inhale 2 puffs into the lungs 2 times daily   hydrOXYzine 25 MG tablet; Commonly known as: ATARAX; Notes to patient:   Use: to treat anxiety, itching, and sometimes nausea Side effects:   drowsiness, dry mouth, restlessness   insulin lispro (1 Unit Dial) 100 UNIT/ML Sopn; Inject 0-6 Units into the   skin 3 times daily (with meals) **Corrective Low Dose Algorithm**   Glucose: Dose:              No Insulin 140-199 1 Unit 200-249 2   Units 250-299 3 Units 300-349 4 Units 350-399 5 Units Over 399 6 Units;   Notes to patient: Use: to lower blood sugar, short acting Side effects:   dizziness/fatigue if blood sugar drops too low   Insulin Pen Needle 29G X 12.7MM Misc; 1 each by Does not apply route   daily   Lantus SoloStar 100 UNIT/ML injection pen; Generic drug: insulin   glargine; Notes to patient: Long acting insulin; keeps blood sugar from   raising really high throughout the day Side effects: low blood sugar   lisinopril 10 MG tablet; Commonly known as: PRINIVIL;ZESTRIL; Take 1   tablet by mouth daily; Notes to patient: Lisinopril is used to treat high   blood pressure, help heart function after a heart attack, help a weak   heart. Side effects: dizziness, headache, cough.  methyl salicylate-menthol 76-50 % Crea;  Apply topically 3 times daily as   needed for Pain   pregabalin 75 MG capsule; Commonly known as: LYRICA; TAKE ONE CAPSULE BY   MOUTH EVERY NIGHT; Notes to patient: Lyrica (Pregabalin) Uses: nerve pain, seizures Side Effects: swelling, sleepiness, blurry vision, weight gain   propranolol 80 MG extended release capsule; Commonly known as: INDERAL   LA; TAKE ONE CAPSULE BY MOUTH EVERY MORNING; Notes to patient: USE: Beta   Blocker - may aide in reducing tremors; reduces heart rate and blood   pressure  Side effects: lightheadedness, fatigue, slow heart rate   Pulse Oximeter Misc; 1 each by Does not apply route every 6-8 hours as   needed (shortness of breath)   ThermaCare Back/Hip Misc; 1 each by Does not apply route daily as needed   (back pain)         Allergies: Allergies   Allergen Reactions    Amoxicillin Hives, Itching and Other (See Comments)     Tolerates cephalosporins  Patient tolerating cefazolin (ANCEF) as of October 11, 2018      Levofloxacin Anaphylaxis    Vancomycin Anaphylaxis, Hives and Shortness Of Breath    Tape [Adhesive Tape] Other (See Comments)     Paper tape turns skin bright red. Plastic tape okay. Follow up Instructions: Follow-up with PCP: Pallavi Armstrong in 2 wk . Total time spent on day of discharge including face-to-face visit, examination, documentation, counseling, preparation of discharge plans and followup, and discharge medicine reconciliation and presciptions is 31 minutes      ---       Subjective from day of d/c:   Quique Quinteros is a 55 y.o. female. Pt seen and examined  Chart reviewed since last visit, labs and imaging below      More alert  Accepted to [facility]    She denies chest pain, denies shortness of breath, denies nausea,  denies emesis. 10 system Review of Systems is reviewed with patient, and pertinent positives are noted in HPI above . Otherwise, Review of systems is negative. I have reviewed the patient's medical and social history in detail and updated the computerized patient record.   To recap: She  has a past medical history of Blind in both eyes, Cerebral artery occlusion with cerebral infarction [facility]), CHF (congestive heart failure) ([facility] Chronic kidney disease, Depression, Diabetes mellitus out of control ([facility] Diabetes mellitus, type II ([facility] Diabetic neuropathy associated with type 2 diabetes mellitus ([facility] Generalized headaches, Hypertension, Infertility, Insomnia, Migraine headache, Mixed hyperlipidemia, Otitis media, Pelvic abscess in female, Pneumonia, Stroke [facility]), and Stroke ([facility] . She  has a past surgical history that includes Cervix surgery; eye surgery; Foot surgery (Right); Foot surgery (Bilateral); Foot surgery (Left); IR TUNNELED CVC PLACE WO SQ PORT/PUMP > 5 YEARS (9/7/2021); and Dialysis fistula creation (Right, 2/10/2022). . She  reports that she has been smoking cigarettes. She has been smoking about 1.00 pack per day. She has never used smokeless tobacco. She reports that she does not drink alcohol and does not use drugs. .      Current Facility-Administered Medications: [START ON 2/25/2022] buPROPion (WELLBUTRIN XL) extended release tablet 150 mg, 150 mg, Oral, Q48H  LORazepam (ATIVAN) tablet 0.5 mg, 0.5 mg, Oral, Q4H PRN  aspirin EC tablet 81 mg, 81 mg, Oral, Daily  atorvastatin (LIPITOR) tablet 40 mg, 40 mg, Oral, Nightly  lisinopril (PRINIVIL;ZESTRIL) tablet 10 mg, 10 mg, Oral, Daily  propranolol (INDERAL LA) extended release capsule 80 mg, 80 mg, Oral, Daily  albuterol sulfate  (90 Base) MCG/ACT inhaler 2 puff, 2 puff, Inhalation, BID  albuterol sulfate  (90 Base) MCG/ACT inhaler 2 puff, 2 puff, Inhalation, Q4H PRN  glucose (GLUTOSE) 40 % oral gel 15 g, 15 g, Oral, PRN  dextrose 10 % infusion, 12.5 g, IntraVENous, PRN  glucagon (rDNA) injection 1 mg, 1 mg, IntraMUSCular, PRN  dextrose 5 % solution, 100 mL/hr, IntraVENous, PRN  insulin lispro (1 Unit Dial) 0-6 Units, 0-6 Units, SubCUTAneous, TID WC  insulin lispro (1 Unit Dial) 0-3 Units, 0-3 Units, SubCUTAneous, Nightly  heparin (porcine) injection 5,000 Units, 5,000 Units, SubCUTAneous, 3 times per day  heparin (porcine) injection 3,600 Units, 3,600 Units, IntraCATHeter, PRN  labetalol (NORMODYNE;TRANDATE) injection 20 mg, 20 mg, IntraVENous, Q6H PRN  naloxone (NARCAN) injection 1 mg, 1 mg, IntraVENous, Once  propranolol (INDERAL LA) extended release capsule 80 mg, 80 mg, Oral, Once         Objective (exam): see above    Labs at time of d/c:  Lab Results   Component Value Date    WBC 12.7 (H) 02/24/2022    HGB 11.8 (L) 02/24/2022    HCT 36.7 02/24/2022     (L) 02/24/2022    CHOL 164 02/24/2021    TRIG 319 (H) 08/31/2021    HDL 41 02/24/2021    LDLDIRECT 100 (H) 04/18/2011    ALT <5 (L) 02/20/2022    AST 26 02/20/2022     (L) 02/24/2022    K 4.1 02/24/2022    CL 92 (L) 02/24/2022    CREATININE 5.1 (HH) 02/24/2022    BUN 43 (H) 02/24/2022    CO2 26 02/24/2022    INR 0.97 02/07/2022    LABA1C 6.7 01/05/2022    LABMICR YES 02/20/2022     Lab Results   Component Value Date    CKTOTAL 25 (L) 09/13/2021    TROPONINI 0.25 (H) 02/20/2022       (Please note that portions of this note were completed with a voice recognition program.  Efforts were made to edit the dictations but occasionally words are mis-transcribed.)      Signed:  Elana Desouza MD  2/24/2022

## 2022-02-24 NOTE — FLOWSHEET NOTE
Treatment time: 3 hours  Net UF: 1000 ml    Pre weight: 65.3 kg   Post weight: 64.3 kg  EDW: 79 kg    Access used: R Chest Tunneled Catheter  Access function: good with  ml/min    Medications or blood products given: none     Regular outpatient schedule: MWF    Summary of response to treatment: Post Treatment pt is noted to be alert,  no complaints, VSS, tolerated treatment and fluid removal well, report called to primary care nurse, stable upon d/c from [address] [place] [address]          Copy of dialysis treatment record placed in chart, to be scanned into EMR.       02/23/22 0849 02/23/22 1210   Vital Signs   BP (!) 176/92 (!) 185/97   Temp 97.3 °F (36.3 °C) 96.7 °F (35.9 °C)   Pulse 85 93   Resp 18 18   Weight 143 lb 15.4 oz (65.3 kg) 141 lb 12.1 oz (64.3 kg)   Weight Method Bed scale  (HOB flat, 1 pillow, 1 blanket, 1 telebox.) Estimated  (1000ml net UF deducted from pre-wt.)   Percent Weight Change 0 0

## 2022-02-24 NOTE — CARE COORDINATION
Spoke to [facility] at [facility]. They are still working on HD transport set up for patient but are hopeful it will be set up today. Pt had a RAPID this morning which was negative. Will need HENS and packet completed. Addendum:  SW informed that patient's HD transport has been arranged. [facility] stated she needed to clear one more thing with her DON but stated to go ahead and set up transport. Transport set with [facility] at [employer]. Rapid COVID completed. HENS completed. Discharge packet completed and everything faxed. Discharge Plan:  Home at [facility] [facility], [address] [place] [address]  Report = [phone]  Fax = [phone] with Perla Chuckie, [phone], [facility]     Electronically signed by LEYDI Ames, BENNY on 2/24/2022 at 11:56 AM

## 2022-02-25 NOTE — PROGRESS NOTES
Data- discharge order received, pt & spouse Kyung Turcios (appointed legal authority) verbalized agreement to discharge, disposition to [facility] #[phone], Vero Pack reviewed and signed by physician. Action- AVS prepared, SARAH completed/ reported faxed by case management/. Discharge instruction summary: Diet- Dysphasia minced moist meats with thin liquids, Activity- as tolerated requires total assist with care-mobility and meals, medications prescriptions to be filled at receiving facility, Transfer code status: Full code, LDAs to remain with discharge: Vas Cath. Response- Bedside RN already call report to receiving facility. Pt belongings gathered, Disposition to Discharged via cart/stretcher and via ambulance to skilled nursing by EMS transportation, no complications reported. 1. WEIGHT: Admit Weight: 190 lb (86.2 kg) (02/20/22 1153)        Today  Weight: 146 lb 2.6 oz (66.3 kg) (02/24/22 0418)       2.  O2 SAT.: SpO2: 96 % (02/24/22 0800)

## 2022-03-01 ENCOUNTER — HOSPITAL ENCOUNTER (EMERGENCY)
Age: 47
Discharge: ANOTHER HOSPITAL | End: 2022-03-02
Attending: EMERGENCY MEDICINE
Payer: COMMERCIAL

## 2022-03-01 DIAGNOSIS — F29 PSYCHOSIS, UNSPECIFIED PSYCHOSIS TYPE (HCC): Primary | ICD-10-CM

## 2022-03-01 LAB
A/G RATIO: 1.2 (ref 1.1–2.2)
ACETAMINOPHEN LEVEL: <5 UG/ML (ref 10–30)
ALBUMIN SERPL-MCNC: 4.4 G/DL (ref 3.4–5)
ALP BLD-CCNC: 164 U/L (ref 40–129)
ALT SERPL-CCNC: <5 U/L (ref 10–40)
ANION GAP SERPL CALCULATED.3IONS-SCNC: 16 MMOL/L (ref 3–16)
AST SERPL-CCNC: 21 U/L (ref 15–37)
BASOPHILS ABSOLUTE: 0.1 K/UL (ref 0–0.2)
BASOPHILS RELATIVE PERCENT: 0.5 %
BILIRUB SERPL-MCNC: 0.4 MG/DL (ref 0–1)
BUN BLDV-MCNC: 30 MG/DL (ref 7–20)
CALCIUM SERPL-MCNC: 9.6 MG/DL (ref 8.3–10.6)
CHLORIDE BLD-SCNC: 96 MMOL/L (ref 99–110)
CO2: 27 MMOL/L (ref 21–32)
CREAT SERPL-MCNC: 5.2 MG/DL (ref 0.6–1.1)
EOSINOPHILS ABSOLUTE: 0.1 K/UL (ref 0–0.6)
EOSINOPHILS RELATIVE PERCENT: 0.6 %
ETHANOL: NORMAL MG/DL (ref 0–0.08)
GFR AFRICAN AMERICAN: 11
GFR NON-AFRICAN AMERICAN: 9
GLUCOSE BLD-MCNC: 178 MG/DL (ref 70–99)
HCT VFR BLD CALC: 37.6 % (ref 36–48)
HEMOGLOBIN: 12.1 G/DL (ref 12–16)
LYMPHOCYTES ABSOLUTE: 2.2 K/UL (ref 1–5.1)
LYMPHOCYTES RELATIVE PERCENT: 18.4 %
MCH RBC QN AUTO: 26.7 PG (ref 26–34)
MCHC RBC AUTO-ENTMCNC: 32.2 G/DL (ref 31–36)
MCV RBC AUTO: 82.8 FL (ref 80–100)
MONOCYTES ABSOLUTE: 1.1 K/UL (ref 0–1.3)
MONOCYTES RELATIVE PERCENT: 8.9 %
NEUTROPHILS ABSOLUTE: 8.6 K/UL (ref 1.7–7.7)
NEUTROPHILS RELATIVE PERCENT: 71.6 %
PDW BLD-RTO: 20 % (ref 12.4–15.4)
PLATELET # BLD: 257 K/UL (ref 135–450)
PMV BLD AUTO: 8.3 FL (ref 5–10.5)
POTASSIUM SERPL-SCNC: 4.3 MMOL/L (ref 3.5–5.1)
RBC # BLD: 4.54 M/UL (ref 4–5.2)
SALICYLATE, SERUM: <0.3 MG/DL (ref 15–30)
SARS-COV-2, NAAT: NOT DETECTED
SODIUM BLD-SCNC: 139 MMOL/L (ref 136–145)
TOTAL PROTEIN: 8.1 G/DL (ref 6.4–8.2)
WBC # BLD: 12.1 K/UL (ref 4–11)

## 2022-03-01 PROCEDURE — 80179 DRUG ASSAY SALICYLATE: CPT

## 2022-03-01 PROCEDURE — 87635 SARS-COV-2 COVID-19 AMP PRB: CPT

## 2022-03-01 PROCEDURE — 85025 COMPLETE CBC W/AUTO DIFF WBC: CPT

## 2022-03-01 PROCEDURE — 82077 ASSAY SPEC XCP UR&BREATH IA: CPT

## 2022-03-01 PROCEDURE — 99285 EMERGENCY DEPT VISIT HI MDM: CPT

## 2022-03-01 PROCEDURE — 80143 DRUG ASSAY ACETAMINOPHEN: CPT

## 2022-03-01 PROCEDURE — 36415 COLL VENOUS BLD VENIPUNCTURE: CPT

## 2022-03-01 PROCEDURE — 80053 COMPREHEN METABOLIC PANEL: CPT

## 2022-03-01 RX ORDER — OLANZAPINE 10 MG/1
10 INJECTION, POWDER, LYOPHILIZED, FOR SOLUTION INTRAMUSCULAR 2 TIMES DAILY PRN
Status: CANCELLED | OUTPATIENT
Start: 2022-03-01

## 2022-03-01 RX ORDER — OLANZAPINE 10 MG/1
10 TABLET, ORALLY DISINTEGRATING ORAL EVERY 8 HOURS PRN
Status: CANCELLED | OUTPATIENT
Start: 2022-03-01

## 2022-03-01 ASSESSMENT — ENCOUNTER SYMPTOMS
ABDOMINAL PAIN: 0
VOMITING: 0
CHEST TIGHTNESS: 0
SHORTNESS OF BREATH: 0
NAUSEA: 0
DIARRHEA: 0

## 2022-03-01 NOTE — ED PROVIDER NOTES
[address] [facility]        Pt Name: Ariana Coats  MRN: [id number]  [place] 1975  Date of evaluation: 3/1/2022  Provider: RIVERA Nguyen - FIDELINA  PCP: Mali Kumar  Note Started: 2:59 AM EST        I have seen and evaluated this patient with my supervising physician Paris Michel MD.    [address] [place] [address]       Chief Complaint   Patient presents with    Other     [address] in by [facility] from [facility] sent in for hallucinations of violence against her, hx of stroke in the past.       HISTORY OF PRESENT ILLNESS   (Location, Timing/Onset, Context/Setting, Quality, Duration, Modifying Factors, Severity, Associated Signs and Symptoms)  Note limiting factors. Chief Complaint: maria elena Coats is a 55 y.o. female who presents to the emergency department with concern of hallucinations and aggressive behavior. Patient was sent over from [address] for hallucinations and aggressive behavior to staff. Arrived via EMS. She is awake and alert. She tells me that she has been \"dodging bullets all day\", when I asked her to give more history, she reports \"I cannot tell you because they are here in the hospital and it will just make it worse\". She denies any recent illness. Nursing Notes were all reviewed and agreed with or any disagreements were addressed in the HPI. REVIEW OF SYSTEMS    (2-9 systems for level 4, 10 or more for level 5)     Review of Systems   Constitutional: Negative for activity change, chills and fever. Respiratory: Negative for chest tightness and shortness of breath. Cardiovascular: Negative for chest pain. Gastrointestinal: Negative for abdominal pain, diarrhea, nausea and vomiting. Genitourinary: Negative for dysuria. Psychiatric/Behavioral: Positive for agitation and hallucinations. All other systems reviewed and are negative.       Positives and Pertinent negatives as per HPI. Except as noted above in the ROS, all other systems were reviewed and negative. PAST MEDICAL HISTORY     Past Medical History:   Diagnosis Date    Blind in both eyes     [facility]bral [facility]     CHF (congestive heart failure) (HCC)     Chronic kidney disease     Depression     Diabetes mellitus out of control ([facility]     Diabetes mellitus, type II ([facility]     2005    Diabetic neuropathy associated with type 2 diabetes mellitus ([facility]     Generalized headaches     Hypertension     Infertility     Insomnia     chronic vs lack of time spent to sleep    Migraine headache 11/09/2011    Mixed hyperlipidemia     Otitis media     h/o recurrent    Pelvic abscess in female 10/05/2013    Pneumonia     2004 approx.  St[facility] 08/27/2020    Stroke ([facility] 08/27/2021         SURGICAL HISTORY     Past Surgical History:   Procedure Laterality Date    CERVIX SURGERY      laser tx for dysplasia;1992    DIALYSIS FISTULA CREATION Right 2/10/2022    RIGHT BRACHIO CEPHALIC FISTULA CREATION performed by Susanna Sylvester MD at [address] [place] [address] Right     FOOT SURGERY Bilateral     FOOT SURGERY Left     IR TUNNELED CATHETER PLACEMENT GREATER THAN 5 YEARS  9/7/2021    IR TUNNELED CATHETER PLACEMENT GREATER THAN 5 YEARS 9/7/2021 Celine Mccarthy MD [facility] SPECIAL PROCEDURES         CURRENTMEDICATIONS       Previous Medications    ALBUTEROL SULFATE  (90 BASE) MCG/ACT INHALER    Inhale 2 puffs into the lungs every 6 hours as needed for Wheezing or Shortness of Breath    ALPRAZOLAM (XANAX XR) 3 MG EXTENDED RELEASE TABLET    Take 1 tablet by mouth every morning for 3 days.     ASPIRIN 81 MG EC TABLET    Take 1 tablet by mouth daily    ATORVASTATIN (LIPITOR) 40 MG TABLET    Take 1 tablet by mouth nightly    BENZONATATE (TESSALON) 200 MG CAPSULE    Take 1 capsule by mouth every 8 hours as needed for Cough    BUPROPION (WELLBUTRIN XL) 150 MG EXTENDED RELEASE TABLET    Take 1 tablet by mouth every morning    DULAGLUTIDE 1.5 MG/0.5ML SOPN    Inject 1.5 mg into the skin once a week    FLUVOXAMINE (LUVOX) 100 MG TABLET    Take 1 tablet by mouth nightly    GLUCAGON, RDNA, (GLUCAGON EMERGENCY) 1 MG KIT    Inject 1 Units as directed as needed (if blood sugar is less than 60 and you are symptomatic)    GLUCOSE (GLUTOSE) 40 % GEL    Take 37.5 mLs by mouth as needed (low blood sugar)    GLYCOPYRROLATE-FORMOTEROL (BEVESPI AEROSPHERE) 9-4.8 MCG/ACT AERO    Inhale 2 puffs into the lungs 2 times daily    HEAT WRAPS (THERMACARE BACK/HIP) MISC    1 each by Does not apply route daily as needed (back pain)    HYDROXYZINE (ATARAX) 25 MG TABLET        INSULIN GLARGINE (LANTUS SOLOSTAR) 100 UNIT/ML INJECTION PEN    Inject 10 Units into the skin every morning     INSULIN LISPRO, 1 UNIT DIAL, 100 UNIT/ML SOPN    Inject 0-6 Units into the skin 3 times daily (with meals) **Corrective Low Dose Algorithm**  Glucose: Dose:               No Insulin  140-199 1 Unit  200-249 2 Units  250-299 3 Units  300-349 4 Units  350-399 5 Units  Over 399 6 Units    INSULIN PEN NEEDLE 29G X 12.7MM MISC    1 each by Does not apply route daily    LISINOPRIL (PRINIVIL;ZESTRIL) 10 MG TABLET    Take 1 tablet by mouth daily    METHYL SALICYLATE-MENTHOL (RANDI LEBLANC GREASELESS) 10-15 % CREA    Apply topically 3 times daily as needed for Pain    MISC.  DEVICES (PULSE OXIMETER) MISC    1 each by Does not apply route every 6-8 hours as needed (shortness of breath)    NASAL DILATORS (BREATHE RIGHT EXTRA STRENGTH) STRP    1 strip by Does not apply route nightly as needed (nasal congestion)    PREGABALIN (LYRICA) 75 MG CAPSULE    TAKE ONE CAPSULE BY MOUTH EVERY NIGHT    PROPRANOLOL (INDERAL LA) 80 MG EXTENDED RELEASE CAPSULE    TAKE ONE CAPSULE BY MOUTH EVERY MORNING         ALLERGIES     Amoxicillin, Levofloxacin, Vancomycin, and Tape Kenny Pop tape]    FAMILYHISTORY       Family Affect: Affect is inappropriate. Behavior: Behavior is withdrawn. Thought Content:  Thought content is paranoid and delusional.         DIAGNOSTIC RESULTS   LABS:    Labs Reviewed   CBC WITH AUTO DIFFERENTIAL - Abnormal; Notable for the following components:       Result Value    WBC 12.1 (*)     RDW 20.0 (*)     Neutrophils Absolute 8.6 (*)     All other components within normal limits    Narrative:     Performed at:  [facility] [address] [facility],  [place], [address]   Phone [phone]   COMPREHENSIVE METABOLIC PANEL - Abnormal; Notable for the following components:    Chloride 96 (*)     Glucose 178 (*)     BUN 30 (*)     CREATININE 5.2 (*)     GFR Non- 9 (*)     GFR African American 11 (*)     Alkaline Phosphatase 164 (*)     ALT <5 (*)     All other components within normal limits    Narrative:     Talya Xie  [employer] tel. [phone],  Chemistry results called to and read back by Rip Wright, 03/01/2022  03:59, by [facility]  Performed at:  [facility] [address] [facility], [address]   Phone [phone]   ACETAMINOPHEN LEVEL - Abnormal; Notable for the following components:    Acetaminophen Level <5 (*)     All other components within normal limits    Narrative:     Talya Xie  [employer] tel. [phone],  Chemistry results called to and read back by Rip Wright, 03/01/2022  03:59, by [facility]  Performed at:  [facility] [address] [facility]  [place], [address]   Phone [phone]   SALICYLATE LEVEL - Abnormal; Notable for the following components:    Salicylate, Serum <8.2 (*)     All other components within normal limits    Narrative:     Talya Xie  [employer] tel. [phone],  Chemistry results called to and read back by Rip Wright, 03/01/2022  03:59, by [facility]  Performed at:  [facility] [address] [facility],  [place], [address]   Phone [phone]   COVID-19, RAPID    Narrative:     Performed at:  [facility]  [address] [facility],  [place], [address]   Phone [phone]   ETHANOL    Narrative:     Nadir Bernard  [facility] tel. [phone],  Chemistry results called to and read back by Amorjoshua Dena, 03/01/2022  03:59, by [facility]  Performed at:  [facility]  [address] [facility],  [place], [address]   Phone [phone]       When ordered only abnormal lab results are displayed. All other labs were within normal range or not returned as of this dictation. EKG: When ordered, EKG's are interpreted by the Emergency Department Physician in the absence of a cardiologist.  Please see their note for interpretation of EKG. RADIOLOGY:   Non-plain film images such as CT, Ultrasound and MRI are read by the radiologist. Plain radiographic images are visualized and preliminarily interpreted by the ED Provider with the below findings:    Interpretation per the Radiologist below, if available at the time of this note:    No orders to display     No results found. PROCEDURES   Unless otherwise noted below, none     Procedures    CRITICAL CARE TIME       CONSULTS:  None      EMERGENCY DEPARTMENT COURSE and DIFFERENTIAL DIAGNOSIS/MDM:   Vitals:    Vitals:    03/01/22 0245   BP: 117/71   Pulse: 65   Resp: 16   Temp: 97.2 °F (36.2 °C)   TempSrc: Oral   SpO2: 97%   Weight: 146 lb (66.2 kg)   Height: 5' 7\" (1.702 m)       Patient was given the following medications:  Medications - No data to display        Briefly, this is a 55year-old hemodialysis patient that presents to the ER with paranoid delusions. The patient believes that there are shooters at [address] and that she has been dodging bullets all day. When asked to clarify, the patient does look around the room and then states \"they are here in the hospital, it will just make it worse. \"    She refuses to give further information.     Tox work-up will be completed, the patient does not make urine, we will not be able to get a UA or urine drug screen. Patient is medically appropriate for psychiatric transfer. She will require hemodialysis at next scheduled dialysis day. FINAL IMPRESSION      1. Psychosis, unspecified psychosis type ([facility]          DISPOSITION/PLAN   DISPOSITION        PATIENT REFERRED TO:  No follow-up provider specified.     DISCHARGE MEDICATIONS:  New Prescriptions    No medications on file       DISCONTINUED MEDICATIONS:  Discontinued Medications    No medications on file              (Please note that portions of this note were completed with a voice recognition program.  Efforts were made to edit the dictations but occasionally words are mis-transcribed.)    RIVERA De Leon CNP (electronically signed)            RIVERA De Leon CNP  03/01/22 9566 [address] [place] [address], APRN - CNP  03/01/22 3284

## 2022-03-01 NOTE — ED PROVIDER NOTES
[facility]    Physician Attestation    Pt Name: Shane Mishra  MRN: [id number]  [place] 1975  Date of evaluation: 3/1/22        Physician Note:    I havepersonally performed and/or participated in the history, exam and medical decision making and agree with all pertinent clinical information. I have also reviewed and agree with the past medical, family and social historyunless otherwise noted. I have personally performed a face to face diagnostic evaluation onthis patient. I have reviewed the mid-levels findings and agree. History: Multiple medical problems including hypertension diabetes recent stroke esrd on dialysis  from the nursing home tonight with aggressive behavior and paranoid she thought an active shooter was trying to shoot her and she is dodging bullets she cannot tell me what was going on because she was afraid they were here in the department pursuing her is no history of the record of paranoid or psychotic behavior      REVIEW OF SYSTEMS    Constitutional:  Denies fever, chills, or weakness   Eyes:  Denies vision changes  HENT:  Denies sore throat or neck pain   Respiratory:  Denies cough or shortness of breath   Cardiovascular:  Denies chest pain  GI:  Denies abdominal pain, nausea, vomiting, or diarrhea   Musculoskeletal:  Denies back pain   Skin: no rash or vesicles   Neurologic:  no headache weakness focal    Lymphatic:  no swollen  nodes   Psychiatric: no si or hs thoughts     All systems negative except as marked. General Appearance:  Alert, cooperative, no distress, appears stated age. Head:  Normocephalic, without obvious abnormality, atraumatic. Eyes:  conjunctiva/corneas clear, EOM's intact. Sclera anicteric. ENT: Mucous membranes moist.   Neck: Supple, symmetrical, trachea midline, no adenopathy. No jugular venous distention. Lungs:   No Respiratory Distress. no rales  rhonchi rub   Chest Wall:  Nontender  no deformity   Heart:  Rsr no murmer gallop    Abdomen:   Soft nontender no organomegally    Extremities:  Full range of motion. no deformity   Pulses: Equal  upper and lower    Skin:  No rashes or lesions to exposed skin. Neurologic: Alert and oriented X 3. Motor grossly normal.  Speech clear. Cr n 2-12 intact   Judgment impaired auditory visual hallucination      1. Psychosis, unspecified psychosis type ([facility]          DISPOSITION/PLAN  PATIENT REFERRED TO:  No follow-up provider specified.   DISCHARGE MEDICATIONS:  New Prescriptions    No medications on file         MD Paris Vo MD  03/01/22 4987

## 2022-03-01 NOTE — ED NOTES
Kavita BATES RN spoke with  to confirm that Pt was at [facility] for rehab and \"mental issues\". Pt was working with PT/OT, able to walk with a gait belt and walker, recently has not been able to ambulate much since. Pt has expressed to this RN that she is able to see shadows and reflective surfaces, but everything else is not clear. Per  hallucinations is not uncommon for his spouse.      Bindu Coffey RN  03/01/22 1615

## 2022-03-01 NOTE — ED NOTES
Pt had 1L NC applied due to Pt stating that she felt that she was SOB, vital signs taken during this period showed O2 96%, pulse 87, RR 18.        Tran Sanchez RN  03/01/22 0116
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
## 2022-03-02 ENCOUNTER — HOSPITAL ENCOUNTER (INPATIENT)
Age: 47
LOS: 5 days | Discharge: HOME | DRG: 751 | End: 2022-03-07
Attending: PSYCHIATRY & NEUROLOGY | Admitting: PSYCHIATRY & NEUROLOGY
Payer: COMMERCIAL

## 2022-03-02 VITALS
SYSTOLIC BLOOD PRESSURE: 151 MMHG | RESPIRATION RATE: 14 BRPM | DIASTOLIC BLOOD PRESSURE: 75 MMHG | HEART RATE: 71 BPM | WEIGHT: 146 LBS | TEMPERATURE: 97.2 F | BODY MASS INDEX: 22.91 KG/M2 | OXYGEN SATURATION: 99 % | HEIGHT: 67 IN

## 2022-03-02 LAB
GLUCOSE BLD-MCNC: 155 MG/DL (ref 70–99)
GLUCOSE BLD-MCNC: 211 MG/DL (ref 70–99)
GLUCOSE BLD-MCNC: 92 MG/DL (ref 70–99)
PERFORMED ON: ABNORMAL
PERFORMED ON: ABNORMAL
PERFORMED ON: NORMAL

## 2022-03-02 PROCEDURE — 99222 1ST HOSP IP/OBS MODERATE 55: CPT | Performed by: NURSE PRACTITIONER

## 2022-03-02 PROCEDURE — 90935 HEMODIALYSIS ONE EVALUATION: CPT

## 2022-03-02 PROCEDURE — 99223 1ST HOSP IP/OBS HIGH 75: CPT | Performed by: PSYCHIATRY & NEUROLOGY

## 2022-03-02 PROCEDURE — 6370000000 HC RX 637 (ALT 250 FOR IP): Performed by: NURSE PRACTITIONER

## 2022-03-02 PROCEDURE — 6360000002 HC RX W HCPCS: Performed by: INTERNAL MEDICINE

## 2022-03-02 PROCEDURE — 1240000000 HC EMOTIONAL WELLNESS R&B

## 2022-03-02 PROCEDURE — 94761 N-INVAS EAR/PLS OXIMETRY MLT: CPT

## 2022-03-02 PROCEDURE — 6370000000 HC RX 637 (ALT 250 FOR IP): Performed by: PSYCHIATRY & NEUROLOGY

## 2022-03-02 PROCEDURE — 5A1D70Z PERFORMANCE OF URINARY FILTRATION, INTERMITTENT, LESS THAN 6 HOURS PER DAY: ICD-10-PCS | Performed by: PSYCHIATRY & NEUROLOGY

## 2022-03-02 RX ORDER — DEXTROSE MONOHYDRATE 25 G/50ML
12.5 INJECTION, SOLUTION INTRAVENOUS PRN
Status: DISCONTINUED | OUTPATIENT
Start: 2022-03-02 | End: 2022-03-02

## 2022-03-02 RX ORDER — POLYETHYLENE GLYCOL 3350 17 G
2 POWDER IN PACKET (EA) ORAL
Status: DISCONTINUED | OUTPATIENT
Start: 2022-03-02 | End: 2022-03-07 | Stop reason: HOSPADM

## 2022-03-02 RX ORDER — HEPARIN SODIUM 1000 [USP'U]/ML
3600 INJECTION, SOLUTION INTRAVENOUS; SUBCUTANEOUS PRN
Status: DISCONTINUED | OUTPATIENT
Start: 2022-03-02 | End: 2022-03-07 | Stop reason: SDUPTHER

## 2022-03-02 RX ORDER — ALBUTEROL SULFATE 90 UG/1
2 AEROSOL, METERED RESPIRATORY (INHALATION) EVERY 6 HOURS PRN
Status: DISCONTINUED | OUTPATIENT
Start: 2022-03-02 | End: 2022-03-07 | Stop reason: HOSPADM

## 2022-03-02 RX ORDER — RISPERIDONE 1 MG/1
1 TABLET, FILM COATED ORAL 2 TIMES DAILY
Status: DISCONTINUED | OUTPATIENT
Start: 2022-03-02 | End: 2022-03-02

## 2022-03-02 RX ORDER — NICOTINE POLACRILEX 4 MG
15 LOZENGE BUCCAL PRN
Status: DISCONTINUED | OUTPATIENT
Start: 2022-03-02 | End: 2022-03-07 | Stop reason: HOSPADM

## 2022-03-02 RX ORDER — MIDODRINE HYDROCHLORIDE 5 MG/1
10 TABLET ORAL 3 TIMES DAILY PRN
Status: DISCONTINUED | OUTPATIENT
Start: 2022-03-02 | End: 2022-03-07 | Stop reason: HOSPADM

## 2022-03-02 RX ORDER — PREGABALIN 25 MG/1
75 CAPSULE ORAL NIGHTLY
Status: DISCONTINUED | OUTPATIENT
Start: 2022-03-02 | End: 2022-03-07 | Stop reason: HOSPADM

## 2022-03-02 RX ORDER — HALOPERIDOL 10 MG/1
5 TABLET ORAL 2 TIMES DAILY
Status: DISCONTINUED | OUTPATIENT
Start: 2022-03-02 | End: 2022-03-03

## 2022-03-02 RX ORDER — ASPIRIN 81 MG/1
81 TABLET ORAL DAILY
Status: DISCONTINUED | OUTPATIENT
Start: 2022-03-02 | End: 2022-03-07 | Stop reason: HOSPADM

## 2022-03-02 RX ORDER — LISINOPRIL 10 MG/1
10 TABLET ORAL DAILY
Status: DISCONTINUED | OUTPATIENT
Start: 2022-03-02 | End: 2022-03-02

## 2022-03-02 RX ORDER — ATORVASTATIN CALCIUM 40 MG/1
40 TABLET, FILM COATED ORAL NIGHTLY
Status: DISCONTINUED | OUTPATIENT
Start: 2022-03-02 | End: 2022-03-07 | Stop reason: HOSPADM

## 2022-03-02 RX ORDER — ACETAMINOPHEN 325 MG/1
650 TABLET ORAL EVERY 4 HOURS PRN
Status: DISCONTINUED | OUTPATIENT
Start: 2022-03-02 | End: 2022-03-07 | Stop reason: HOSPADM

## 2022-03-02 RX ORDER — INSULIN GLARGINE 100 [IU]/ML
10 INJECTION, SOLUTION SUBCUTANEOUS EVERY MORNING
Status: DISCONTINUED | OUTPATIENT
Start: 2022-03-02 | End: 2022-03-07 | Stop reason: HOSPADM

## 2022-03-02 RX ORDER — BENZTROPINE MESYLATE 1 MG/ML
1 INJECTION INTRAMUSCULAR; INTRAVENOUS 2 TIMES DAILY PRN
Status: DISCONTINUED | OUTPATIENT
Start: 2022-03-02 | End: 2022-03-07 | Stop reason: HOSPADM

## 2022-03-02 RX ORDER — DEXTROSE MONOHYDRATE 50 MG/ML
100 INJECTION, SOLUTION INTRAVENOUS PRN
Status: DISCONTINUED | OUTPATIENT
Start: 2022-03-02 | End: 2022-03-07 | Stop reason: HOSPADM

## 2022-03-02 RX ADMIN — PREGABALIN 75 MG: 25 CAPSULE ORAL at 20:56

## 2022-03-02 RX ADMIN — INSULIN LISPRO 2 UNITS: 100 INJECTION, SOLUTION INTRAVENOUS; SUBCUTANEOUS at 12:54

## 2022-03-02 RX ADMIN — HEPARIN SODIUM 3600 UNITS: 1000 INJECTION INTRAVENOUS; SUBCUTANEOUS at 17:39

## 2022-03-02 RX ADMIN — INSULIN GLARGINE 10 UNITS: 100 INJECTION, SOLUTION SUBCUTANEOUS at 11:10

## 2022-03-02 RX ADMIN — HALOPERIDOL 5 MG: 10 TABLET ORAL at 22:53

## 2022-03-02 RX ADMIN — ATORVASTATIN CALCIUM 40 MG: 40 TABLET, FILM COATED ORAL at 20:57

## 2022-03-02 RX ADMIN — ASPIRIN 81 MG: 81 TABLET, COATED ORAL at 11:10

## 2022-03-02 ASSESSMENT — SLEEP AND FATIGUE QUESTIONNAIRES
DO YOU HAVE DIFFICULTY SLEEPING: YES
RESTFUL SLEEP: NO
DIFFICULTY FALLING ASLEEP: YES
DIFFICULTY ARISING: NO
DO YOU USE A SLEEP AID: NO
DIFFICULTY STAYING ASLEEP: YES

## 2022-03-02 ASSESSMENT — PAIN SCALES - WONG BAKER: WONGBAKER_NUMERICALRESPONSE: 0

## 2022-03-02 ASSESSMENT — LIFESTYLE VARIABLES: HISTORY_ALCOHOL_USE: NO

## 2022-03-02 NOTE — PLAN OF CARE
Problem: Altered Mood, Psychotic Behavior:  Goal: Ability to achieve adequate nutritional intake will improve  Description: Ability to achieve adequate nutritional intake will improve  Outcome: Ongoing   Nilda Savage did not eat much of her lunch. She was in the day room, sitting in a recliner. She did not eat much. Nilda Savage is actively responding to internal stimuli.

## 2022-03-02 NOTE — PROGRESS NOTES
`[facility]  Admission Note     Admission Type:        Reason for admission:  Reason for Admission: psychosis    PATIENT STRENGTHS:  Strengths: Communication    Patient Strengths and Limitations:       Addictive Behavior:   Addictive Behavior  In the past 3 months, have you felt or has someone told you that you have a problem with:  : None  Do you have a history of Chemical Use?: No  Do you have a history of Alcohol Use?: No  Do you have a history of Street Drug Abuse?: No  Histroy of Prescripton Drug Abuse?: No    Medical Problems:   Past Medical History:   Diagnosis Date    Blind in both eyes     C[facility]bral [facility] ([facility]     CHF (congestive heart failure) ([facility]     Chronic kidney disease     COPD (chronic obstructive pulmonary disease) ([facility]     Depression     Diabetes mellitus out of control ([facility]     Diabetes mellitus, type II ([facility]     2005    Diabetic neuropathy associated with type 2 diabetes mellitus ([facility]     Generalized headaches     Hypertension     Infertility     Insomnia     chronic vs lack of time spent to sleep    Migraine headache 11/09/2011    Mixed hyperlipidemia     Otitis media     h/o recurrent    Pelvic abscess in female 10/05/2013    Pneumonia     2004 approx.     Stroke ([facility] 08/27/2020    Stroke ([facility] 08/27/2021       Status EXAM:  Status and Exam  Normal: No  Facial Expression: Elevated  Affect: Appropriate  Level of Consciousness: Alert  Mood:Normal: Yes  Motor Activity:Normal: No  Motor Activity: Decreased  Interview Behavior: Cooperative  Preception: Lane to Person,Lane to Time,Lane to Place  Attention:Normal: Yes  Thought Processes: Flt.of Ideas  Thought Content:Normal: No  Thought Content: Delusions  Hallucinations: None (patient denies)  Delusions: Yes  Memory:Normal: Yes  Insight and Judgment: No  Insight and Judgment: Poor Insight,Unrealistic  Present Suicidal Ideation: No  Present Homicidal Ideation: No    Tobacco Screening:  Practical Counseling, on admission, teressa X, if applicable and completed (first 3 are required if patient doesn't refuse):            ( )  Recognizing danger situations (included triggers and roadblocks)                    ( )  Coping skills (new ways to manage stress, exercise, relaxation techniques, changing routine, distraction)                                                           ( )  Basic information about quitting (benefits of quitting, techniques in how to quit, available resources  ( ) Referral for counseling faxed to [facility]                                           ( ) Patient refused counseling  ( ) Patient has not smoked in the last 30 days    Metabolic Screening:    Lab Results   Component Value Date    LABA1C 6.7 01/05/2022       No results for input(s): CHOL, TRIG, HDL, LDLCALC, LABVLDL in the last 72 hours. Body mass index is 25.61 kg/m². BP Readings from Last 2 Encounters:   03/02/22 139/74   03/02/22 (!) 151/75           Pt admitted with followings belongings:        Valuables placed in safe in security envelope,. Patient's home medications were none. Patient oriented to surroundings and program expectations and copy of patient rights given. Received admission packet:  yes. Consents reviewed, signed yes. Refused no. Patient verbalize understanding:  yes.     Patient education on precautions: yes                   Layla Lo RN
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
## 2022-03-02 NOTE — CONSULTS
[facility]  Nephrology Consult Note           Reason for Consult: ESRD  Requesting Physician:  Dr. Clovis Li    Chief Complaint:  No chief complaint on file. History of Present Illness on 3/2/2022:    55 y.o. yo female with PMH of diabetes, hypertension, Legal blindness, diabetic neuropathy, hyperlipidemia, CVA, ESRD on HD Monday Wednesday Friday at [facility] who is admitted [facility] psych unit for psychosis. Nephrology has been consulted for dialysis needs    Past Medical History:        Diagnosis Date    Blind in both eyes     Cerebral artery occlusion with cerebral infarction ([address]     CHF (congestive heart failure) ([address]     Chronic kidney disease     COPD (chronic obstructive pulmonary disease) ([address]     Depression     Diabetes mellitus out of control ([address]     Diabetes mellitus, type II ([address]     2005    Diabetic neuropathy associated with type 2 diabetes mellitus ([address]     Generalized headaches     Hypertension     Infertility     Insomnia     chronic vs lack of time spent to sleep    Migraine headache 11/09/2011    Mixed hyperlipidemia     Otitis media     h/o recurrent    Pelvic abscess in female 10/05/2013    Pneumonia     2004 approx.  Stroke ([address] 08/27/2020    Stroke ([address] 08/27/2021       Past Surgical History:        Procedure Laterality Date    CERVIX SURGERY      laser tx for dysplasia;1992    DIALYSIS FISTULA CREATION Right 2/10/2022    RIGHT BRACHIO CEPHALIC FISTULA CREATION performed by Phil Grady MD at [address] [place] [address] Right     FOOT SURGERY Bilateral     FOOT SURGERY Left     IR TUNNELED CATHETER PLACEMENT GREATER THAN 5 YEARS  9/7/2021    IR TUNNELED CATHETER PLACEMENT GREATER THAN 5 YEARS 9/7/2021 Pia Barajas MD [facility] SPECIAL PROCEDURES       Home Medications:    No current facility-administered medications on file prior to encounter.      Current Outpatient Medications on File Prior to Encounter   Medication Sig Dispense Refill    ALPRAZolam (XANAX XR) 3 MG extended release tablet Take 1 tablet by mouth every morning for 3 days. 3 tablet 0    Glucagon, rDNA, (GLUCAGON EMERGENCY) 1 MG KIT Inject 1 Units as directed as needed (if blood sugar is less than 60 and you are symptomatic) 1 kit 1    lisinopril (PRINIVIL;ZESTRIL) 10 MG tablet Take 1 tablet by mouth daily 30 tablet 3    pregabalin (LYRICA) 75 MG capsule TAKE ONE CAPSULE BY MOUTH EVERY NIGHT 30 capsule 0    Heat Wraps (THERMACARE BACK/HIP) MISC 1 each by Does not apply route daily as needed (back pain) 3 each 5    methyl salicylate-menthol (RANDI LEBLANC GREASELESS) 10-15 % CREA Apply topically 3 times daily as needed for Pain 57 g 0    buPROPion (WELLBUTRIN XL) 150 MG extended release tablet Take 1 tablet by mouth every morning 30 tablet 1    propranolol (INDERAL LA) 80 MG extended release capsule TAKE ONE CAPSULE BY MOUTH EVERY MORNING 30 capsule 0    hydrOXYzine (ATARAX) 25 MG tablet       albuterol sulfate  (90 Base) MCG/ACT inhaler Inhale 2 puffs into the lungs every 6 hours as needed for Wheezing or Shortness of Breath 18 g 1    fluvoxaMINE (LUVOX) 100 MG tablet Take 1 tablet by mouth nightly 30 tablet 1    Dulaglutide 1.5 MG/0.5ML SOPN Inject 1.5 mg into the skin once a week (Patient taking differently: Inject 1.5 mg into the skin once a week FRIDAYS) 4 pen 1    Misc.  Devices (PULSE OXIMETER) MISC 1 each by Does not apply route every 6-8 hours as needed (shortness of breath) 1 each 0    Nasal Dilators (BREATHE RIGHT EXTRA STRENGTH) STRP 1 strip by Does not apply route nightly as needed (nasal congestion) 8 strip 2    insulin glargine (LANTUS SOLOSTAR) 100 UNIT/ML injection pen Inject 10 Units into the skin every morning       glycopyrrolate-formoterol (BEVESPI AEROSPHERE) 9-4.8 MCG/ACT AERO Inhale 2 puffs into the lungs 2 times daily 10.7 g 2    glucose (GLUTOSE) 40 % GEL Take 37.5 mLs by mouth as needed (low blood sugar) 45 g 1    atorvastatin (LIPITOR) 40 MG tablet Take 1 tablet by mouth nightly 30 tablet 3    Insulin Pen Needle 29G X 12.7MM MISC 1 each by Does not apply route daily 100 each 5    aspirin 81 MG EC tablet Take 1 tablet by mouth daily 30 tablet 3    insulin lispro, 1 Unit Dial, 100 UNIT/ML SOPN Inject 0-6 Units into the skin 3 times daily (with meals) **Corrective Low Dose Algorithm**  Glucose: Dose:               No Insulin  140-199 1 Unit  200-249 2 Units  250-299 3 Units  300-349 4 Units  350-399 5 Units  Over 399 6 Units 3 pen 0       Allergies:  Amoxicillin, Levofloxacin, Vancomycin, and Tape Diane Richard tape]    Social History:    Social History     Socioeconomic History    Marital status:      Spouse name: Michelle Burch Number of children: 0    Years of education: Not on file    Highest education level: Not on file   Occupational History    Occupation: works as    Tobacco Use    Smoking status: Former Smoker     Packs/day: 1.00     Types: Cigarettes    Smokeless tobacco: Never Used    Tobacco comment: Quit in August 2021   Vaping Use    Vaping Use: Never used   Substance and Sexual Activity    Alcohol use: No     Comment: Very Rare    Drug use: No    Sexual activity: Yes     Partners: Male   Other Topics Concern    Not on file   Social History Narrative    Not on file     Social Determinants of Health     Financial Resource Strain:     Difficulty of Paying Living Expenses: Not on file   Food Insecurity:     Worried About Running Out of Food in the Last Year: Not on file    Chris of Food in the Last Year: Not on file   Transportation Needs:     Lack of Transportation (Medical): Not on file    Lack of Transportation (Non-Medical):  Not on file   Physical Activity:     Days of Exercise per Week: Not on file    Minutes of Exercise per Session: Not on file   Stress:     Feeling of Stress : Not on file   Social Connections:     Frequency of Communication with Friends and Family: Not on file    Frequency of Social Gatherings with Friends and Family: Not on file    Attends Mormon Services: Not on file    Active Member of Clubs or Organizations: Not on file    Attends Club or Organization Meetings: Not on file    Marital Status: Not on file   Intimate Partner Violence:     Fear of Current or Ex-Partner: Not on file    Emotionally Abused: Not on file    Physically Abused: Not on file    Sexually Abused: Not on file   Housing Stability:     Unable to Pay for Housing in the Last Year: Not on file    Number of [place] in the Last Year: Not on file    Unstable Housing in the Last Year: Not on file       Family History:   Family History   Problem Relation Age of Onset    Diabetes Mother    Steve Layer Other Mother 79        Covid    Diabetes Father     High Blood Pressure Father     Colon Cancer Father     Diabetes Sister     Alcohol Abuse Maternal Grandfather     Diabetes Paternal Grandmother     Alcohol Abuse Paternal Grandfather     Diabetes Paternal Aunt     Diabetes Paternal Uncle        Review of Systems:   Pertinent positives stated above in HPI. All other 10 systems were reviewed and were negative.      Physical exam:   Constitutional:  VITALS:  BP (!) 77/57   Pulse 81   Temp 99.2 °F (37.3 °C) (Oral)   Resp 16   Ht 5' 7\" (1.702 m)   Wt 163 lb 8 oz (74.2 kg)   LMP 02/06/2022 (Within Months)   SpO2 97%   BMI 25.61 kg/m²   Gen: alert, awake  Neck: No JVD  Skin: Unremarkable  Cardiovascular:  S1, S2 without m/r/g   Respiratory: CTA B without w/r/r; respiratory effort normal  Abdomen:  soft, nt, nd,   Extremities: no lower extremity edema  Neuro/Psy: AAoriented times 3 ; moves all 4 ext  Dialysis access right upper extremity AV fistula  Created on 2/10/22 ( Dr Panda Cox); [facility]     Data/  Recent Labs     03/01/22  0326   WBC 12.1*   HGB 12.1   HCT 37.6   MCV 82.8        Recent Labs     03/01/22  0326      K 4.3   CL 96*   CO2 27   GLUCOSE 178*   BUN 30* CREATININE 5.2*   LABGLOM 9*   GFRAA 11*       Assessment  -ESRD on HD Monday Wednesday Friday at [facility] followed by by Dr. Ralph King group     -Psychosis for psychiatry    -Hypertension   Currently hypotensive   -Anemia, above goal    -History of hyperkalemia    -CKD/MBD    Plan  -HD on 3/2 per schedule  -hold lisinopril  -avoid BP and blood draws from RUE  -renal diet  -Midodrine for hypotension for SBP<90 n prn during HD   -Renal dose medications    Thank you for the consultation. Please do not hesitate to call with questions. Sahara Pedroza MD  Office: [phone]  Fax:    [phone] [address] [facility]

## 2022-03-02 NOTE — H&P
Psychiatry History and Physical     Admission Date:    3/2/2022    Identification: This is a domiciled and  53yo with a chart history of depression, anxiety, and OCD, and mutiple chronic medical conditions who was brought to the ED by mariaelena from [facility] with psychotic-like behavior. SOI:  Patient, record review including psychiatry notes from     CC: \"I don't know. \"    HPI:   Per the ED note:  Desirae Robertson is a 55 y.o. female who presents to the emergency department with concern of hallucinations and aggressive behavior. Patient was sent over from [address] for hallucinations and aggressive behavior to staff.       Arrived via EMS.     She is awake and alert. She tells me that she has been \"dodging bullets all day\", when I asked her to give more history, she reports \"I cannot tell you because they are here in the hospital and it will just make it worse\".     She denies any recent illness. Since admission to our unit she has been mildly disoriented, RTIS, and delusional, but pleasant. From what I can deduce from the record, the patient was on at least 3mg of sustained release Xanax daily. She was sent to a SNF about 6 days ago after presenting to the ED from home for weakness, somnolence, and falls. It does not look like she was continued on the Xanax at the SNF. Past Psychiatric History:    Per care everywhere notes from :  Psychiatric History: has had OCD behaviors, and anxiety since teenager, but not formally treated. No therapy  Outpatient Care: Keaton Schroeder NP  Inpatient Care: none  Suicide Attempts: none  History of self-harm: none  History of aggression/violence: none  Prior Psychotropic Medications: bupropion (2006) for about a year, was not helpful, escitalopram (2007) 6 months, no libido; stopped med.     Past Medical History:  Past Medical History:   Diagnosis Date    Blind in both eyes     [facility]     CHF (congestive heart failure) (HCC)     Chronic kidney disease     COPD [facility] disease) [facility]     Depression     Diabetes mellitus out of control ([facility]     Diabetes mellitus, type II ([facility]     2005    Diabetic neuropathy associated with type 2 diabetes mellitus ([facility]     Generalized headaches     Hypertension     Infertility     Insomnia     chronic vs lack of time spent to sleep    Migraine headache 11/09/2011    Mixed hyperlipidemia     Otitis media     h/o recurrent    Pelvic abscess in female 10/05/2013    Pneumonia     2004 approx.  Stroke ([facility] 08/27/2020    Stroke ([facility] 08/27/2021       Home Medications:  Prior to Admission medications    Medication Sig Start Date End Date Taking? Authorizing Provider   ALPRAZolam (XANAX XR) 3 MG extended release tablet Take 1 tablet by mouth every morning for 3 days.  2/23/22 2/26/22  Kane Lynch MD   Glucagon, rDNA, (GLUCAGON EMERGENCY) 1 MG KIT Inject 1 Units as directed as needed (if blood sugar is less than 60 and you are symptomatic) 2/20/22   Esther Phillips, APRN - CNP   lisinopril (PRINIVIL;ZESTRIL) 10 MG tablet Take 1 tablet by mouth daily 1/21/22   Alexus Spencer MD   pregabalin (LYRICA) 75 MG capsule TAKE ONE CAPSULE BY MOUTH EVERY NIGHT 1/3/22 2/2/22  Damon Mireles   Heat aps [facility] EUSEBIA BACK/HIP) MISC 1 each by Does not apply route daily as needed (back pain) 12/15/21   Fernando Early   methyl salicylate-menthol (RANDI LEBLANC GREASELESS) 10-15 % CREA Apply topically 3 times daily as needed for Pain 12/15/21   Fernando Blackduck   buPROPion (WELLBUTRIN XL) 150 MG extended release tablet Take 1 tablet by mouth every morning 12/15/21   Damon Mireles   propranolol (INDERAL LA) 80 MG extended release capsule TAKE ONE CAPSULE BY MOUTH EVERY MORNING 12/10/21   Damon Mireles   hydrOXYzine (ATARAX) 25 MG tablet  10/14/21   Historical Provider, MD   albuterol sulfate  (90 Base) MCG/ACT inhaler Inhale 2 puffs into the lungs every 6 hours as needed for Wheezing or Shortness of Breath 11/30/21   Damon Mireles   fluvoxaMINE (LUVOX) 100 MG tablet Take 1 tablet by mouth nightly 11/30/21 12/30/21  Damon Mireles   Dulaglutide 1.5 MG/0.5ML SOPN Inject 1.5 mg into the skin once a week  Patient taking differently: Inject 1.5 mg into the skin once a week FRIDAYS 11/30/21   [place] [address]. Devices (PULSE OXIMETER) MISC 1 each by Does not apply route every 6-8 hours as needed (shortness of breath) 11/1/21   Damon Mireles   Nasal Dilators (BREATHE RIGHT EXTRA STRENGTH) STRP 1 strip by Does not apply route nightly as needed (nasal congestion) 11/1/21   Damon Mireles   insulin glargine (LANTUS SOLOSTAR) 100 UNIT/ML injection pen Inject 10 Units into the skin every morning     Historical Provider, MD   glycopyrrolate-formoterol (BEVESPI AEROSPHERE) 9-4.8 MCG/ACT AERO Inhale 2 puffs into the lungs 2 times daily 9/23/21   Damon Mireles   glucose (GLUTOSE) 40 % GEL Take 37.5 mLs by mouth as needed (low blood sugar) 9/13/21   Gris Zazueta MD   atorvastatin (LIPITOR) 40 MG tablet Take 1 tablet by mouth nightly 7/8/21   Damon Mireles   Insulin Pen Needle 29G X 12.7MM MISC 1 each by Does not apply route daily 7/8/21   Puja Lopez   aspirin 81 MG EC tablet Take 1 tablet by mouth daily 9/1/20   Arsenio Gustafson MD   insulin lispro, 1 Unit Dial, 100 UNIT/ML SOPN Inject 0-6 Units into the skin 3 times daily (with meals) **Corrective Low Dose Algorithm**  Glucose: Dose:               No Insulin  140-199 1 Unit  200-249 2 Units  250-299 3 Units  300-349 4 Units  350-399 5 Units  Over 399 6 Units 4/29/20   Cha Hurd MD       Chemical Dependency History:   Per  notes:  No History of significant alcohol, benzodiazepine, opiod, or cannabinoid use. No History of prior substance or alcohol abuse treatment history. However, she has been prescribed benzo off/on for years.      Family Mental Health Hx:    Per  notes:   Sister with severe OCD and depression; mother with anxiety after  got cancer; father with depression and panic attacks  Suicide Attempts/Completions: none  Substance Use: grandfather with alcohol use disorder        Social Hx:   From  notes:  Origin: Born in [place], [place] raised in [place] up [place] farm with horses. Was very poor. Didn't have running water or electricity. Very close to father, enjoyed fishing and hiking. Has one sister. 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 Filed for disability 7 months ago.  not working. Now living off of her parents longterm. ROS:  does not describe or endorse recent headaches, changes in vision, shortness of breath, chest pain, skin problems, muscle aches, GI problems, or bleeding problems. PE:    /62   Pulse 78   Temp 97.5 °F (36.4 °C)   Resp 18   Ht 5' 7\" (1.702 m)   Wt 159 lb 6.3 oz (72.3 kg)   LMP 02/06/2022 (Within Months)   SpO2 97%   BMI 24.96 kg/m²       Motor / Gait: evidently can transfer but is mostly wheelchair bound. AIMS: 0    Mental Status Examination:    Appearance:   in chair, poor hygiene   Behavior/Attitude toward examiner: pleasant  Speech:  Poverty, non-pressured   Mood:  \"ok\"  Affect:  Flat    Thought processes:  Disorganized   Thought Content: no SI, no HI + delusional   Perceptions: + RTIS   Attention: impaired  Abstraction: impaired  Cognition: impaired  Insight: impaired   Judgment: impaired    LAB: Reviewed all labs obtained during admission to date. Formulation:  This is a domiciled and  53yo with a chart history of depression, anxiety, and OCD, and mutiple chronic medical conditions who was brought to the ED by mariaelena from [facility] with psychotic-like behavior. Differential includes delirium, benzo withdrawal and psychosis. Dx:   Primary Psychiatric (DSM V) Diagnosis: psychosis   Secondary Psychiatric (DSM V) Diagnoses: depression, anxiety, and OCD  Chemical Dependency Diagnoses: benzos? Plan:    1. Admit to inpatient senior psychiatry for further evaluation, stabilization, and treatment. 2.  On admission, hold Luvox. Continue to hold benzos. Order Haldol 5mg BID, programming, structured milieu, and prn medication for anxiety, agitation, and insomnia. 3. Internal medicine consult for admission. 4. Collateral for diagnostic clarification and care coordination. 5. Admitted on a Statement of Believe but verbally agrees to stay voluntarily. Spent > 70 minutes face to face with patient of which >50% was spent counseling and providing education regarding diagnosis, treatment options, and prognosis.     Chanelle Hodgson MD  Staff Psychiatrist

## 2022-03-02 NOTE — GROUP NOTE
Group Therapy Note    Date: 3/2/2022    Group Start Time: 1000  Group End Time: 7615  Group Topic: Recreational    [address] [facility],[address], L[address]        Group Therapy Note    Attendees: 2    Participants socialized and worked on spring coloring sheets. Notes:   Sundar Robertson attended group but said she cannot see the paper to participate in coloring. Sundar Robertson was having conversations with people who are not there throughout group as well as visual hallucinations of these people. Sundar Robertson was interacting with a peer, this clinician and nursing students.     Status After Intervention:  Unchanged    Participation Level: Interactive    Participation Quality: Intrusive      Speech:  normal      Thought Process/Content: Delusional  Hallucinating      Affective Functioning: Congruent      Mood: anxious      Level of consciousness:  Preoccupied      Response to Learning: Progressing to goal      Endings: None Reported    Modes of Intervention: Socialization and Activity      Discipline Responsible: /Counselor      Signature:  32 Jordan Nix, ATIF

## 2022-03-02 NOTE — PROGRESS NOTES
[facility] called at [phone] and I talked to [facility]. She will fax MAR to unit. She also said that patient's dialysis is Monday, Wednesday and Friday at [facility] in [place].

## 2022-03-02 NOTE — H&P
Hospital Medicine History & Physical      PCP: Keily Jacinto    Date of Admission: 3/2/2022    Date of Service: Pt seen/examined on 3/2/2022     Chief Complaint:  Hallucinations, aggressive behavior    History Of Present Illness: The patient is a 55 y.o. female with DM type 2, neuropathy, h/o CVA, hypertension, hyperlipidemia, COPD, ESRD on HD, CHF, insomnia, and depression who presented to [facility] with complaint of hallucinations and aggressive behavior. Patient was seen and evaluated in the ED by the ED medical provider, patient was medically cleared for admission to [facility] at [facility]. This note serves as an admission medical H&P.    PCP: Keily Jacinto  Tobacco use: former    Past Medical History:        Diagnosis Date    Blind in both eyes     Cerebral artery occlusion with cerebral infarction ([address]     CHF (congestive heart failure) (HCC)     Chronic kidney disease     COPD (chronic obstructive pulmonary disease) ([address]     Depression     Diabetes mellitus out of control ([address]     Diabetes mellitus, type II ([address]     2005    Diabetic neuropathy associated with type 2 diabetes mellitus ([address]     Generalized headaches     Hypertension     Infertility     Insomnia     chronic vs lack of time spent to sleep    Migraine headache 11/09/2011    Mixed hyperlipidemia     Otitis media     h/o recurrent    Pelvic abscess in female 10/05/2013    Pneumonia     2004 approx.     Stroke ([address] 08/27/2020    Stroke ([address] 08/27/2021       Past Surgical History:        Procedure Laterality Date    CERVIX SURGERY      laser tx for dysplasia;1992    DIALYSIS FISTULA CREATION Right 2/10/2022    RIGHT BRACHIO CEPHALIC FISTULA CREATION performed by Ricarda Callejas MD at [address] [place] [address] Right     FOOT SURGERY Bilateral     FOOT SURGERY Left     IR TUNNELED CATHETER PLACEMENT GREATER THAN 5 YEARS  9/7/2021    IR TUNNELED CATHETER PLACEMENT GREATER THAN 5 YEARS 9/7/2021 Jae Braun MD [facility] SPECIAL PROCEDURES       Medications Prior to Admission:    Prior to Admission medications    Medication Sig Start Date End Date Taking? Authorizing Provider   ALPRAZolam (XANAX XR) 3 MG extended release tablet Take 1 tablet by mouth every morning for 3 days. 2/23/22 2/26/22  Gemini Euceda MD   Glucagon, rDNA, (GLUCAGON EMERGENCY) 1 MG KIT Inject 1 Units as directed as needed (if blood sugar is less than 60 and you are symptomatic) 2/20/22   RIVERA Morrison - CNP   lisinopril (PRINIVIL;ZESTRIL) 10 MG tablet Take 1 tablet by mouth daily 1/21/22   Arnulfo Winston MD   pregabalin (LYRICA) 75 MG capsule TAKE ONE CAPSULE BY MOUTH EVERY NIGHT 1/3/22 2/2/22  Damon Mireles   Heat Wraps [facility] BACK/HIP) MISC 1 each by Does not apply route daily as needed (back pain) 12/15/21   [place] Kurt   methyl salicylate-menthol (RANDI LEBLANC GREASELESS) 10-15 % CREA Apply topically 3 times daily as needed for Pain 12/15/21   [place] Kurt   buPROPion (WELLBUTRIN XL) 150 MG extended release tablet Take 1 tablet by mouth every morning 12/15/21   Damon Mireles   propranolol (INDERAL LA) 80 MG extended release capsule TAKE ONE CAPSULE BY MOUTH EVERY MORNING 12/10/21   Damon Mireles   hydrOXYzine (ATARAX) 25 MG tablet  10/14/21   Historical Provider, MD   benzonatate (TESSALON) 200 MG capsule Take 1 capsule by mouth every 8 hours as needed for Cough 11/30/21   Damon Mireles   albuterol sulfate  (90 Base) MCG/ACT inhaler Inhale 2 puffs into the lungs every 6 hours as needed for Wheezing or Shortness of Breath 11/30/21   Damon Mireles   fluvoxaMINE (LUVOX) 100 MG tablet Take 1 tablet by mouth nightly 11/30/21 12/30/21  Damon Mireles   Dulaglutide 1.5 MG/0.5ML SOPN Inject 1.5 mg into the skin once a week  Patient taking differently: Inject 1.5 mg into the skin once a week FRIDAYS 11/30/21   [address]  Devices (PULSE OXIMETER) MISC 1 each by Does not apply route every 6-8 hours as needed (shortness of breath) 11/1/21   Damon Mireles   Nasal Dilators (BREATHE RIGHT EXTRA STRENGTH) STRP 1 strip by Does not apply route nightly as needed (nasal congestion) 11/1/21   Damon Mireles   insulin glargine (LANTUS SOLOSTAR) 100 UNIT/ML injection pen Inject 10 Units into the skin every morning     Historical Provider, MD   glycopyrrolate-formoterol (BEVESPI AEROSPHERE) 9-4.8 MCG/ACT AERO Inhale 2 puffs into the lungs 2 times daily 9/23/21   Damon Mireles   glucose (GLUTOSE) 40 % GEL Take 37.5 mLs by mouth as needed (low blood sugar) 9/13/21   Marvin Hayes MD   atorvastatin (LIPITOR) 40 MG tablet Take 1 tablet by mouth nightly 7/8/21   Damon Mireles   Insulin Pen Needle 29G X 12.7MM MISC 1 each by Does not apply route daily 7/8/21   [place] Salvia   aspirin 81 MG EC tablet Take 1 tablet by mouth daily 9/1/20   Xenia Martin MD   insulin lispro, 1 Unit Dial, 100 UNIT/ML SOPN Inject 0-6 Units into the skin 3 times daily (with meals) **Corrective Low Dose Algorithm**  Glucose: Dose:               No Insulin  140-199 1 Unit  200-249 2 Units  250-299 3 Units  300-349 4 Units  350-399 5 Units  Over 399 6 Units 4/29/20   Milana Gordon MD       Allergies:  Amoxicillin, Levofloxacin, Vancomycin, and Tape Zamzam [place] tape]    Social History:  The patient currently lives at [facility] [address]:   reports that she has quit smoking. Her smoking use included cigarettes. She smoked 1.00 pack per day. She has never used smokeless tobacco.  ETOH:   reports no history of alcohol use.       Family History:   Positive as follows:        Problem Relation Age of Onset    Diabetes Mother    Kumar Other Mother 79        Covid    Diabetes Father     High Blood Pressure Father     Colon Cancer Father     Diabetes Sister     Alcohol Abuse Maternal Grandfather     Diabetes Paternal Grandmother     Alcohol Abuse Paternal Grandfather     Diabetes Paternal Aunt     Diabetes Paternal Uncle        REVIEW OF SYSTEMS:       Patient unable to contribute to ROS: AMS    PHYSICAL EXAM:    /74   Pulse 73   Temp 97.5 °F (36.4 °C) (Oral)   Resp 16   Ht 5' 7\" (1.702 m)   Wt 163 lb 8 oz (74.2 kg)   LMP 02/06/2022 (Within Months)   SpO2 98%   BMI 25.61 kg/m²     Gen: No distress. Alert. Eyes: PERRL. No sclera icterus. No conjunctival injection. ENT: No discharge. Pharynx clear. Neck: Trachea midline. Resp: No accessory muscle use. No crackles. No wheezes. No rhonchi. CV: Regular rate. Regular rhythm. No murmur. No rub. No edema. Right IJ tunneled dialysis catheter  GI: Non-tender. Non-distended. Normal bowel sounds. Skin: Warm and dry. No nodule on exposed extremities. No rash on exposed extremities. Right AV Fistula  M/S: No cyanosis. No joint deformity. No clubbing. Neuro: Awake. JACOB gait. Moves all extremities; follows commands.  Right sided weakness from previous CVA  Psych: Per psychiatry team evaluation       CBC:   Recent Labs     03/01/22 0326   WBC 12.1*   HGB 12.1   HCT 37.6   MCV 82.8        BMP:   Recent Labs     03/01/22 0326      K 4.3   CL 96*   CO2 27   BUN 30*   CREATININE 5.2*     LIVER PROFILE:   Recent Labs     03/01/22 0326   AST 21   ALT <5*   BILITOT 0.4   ALKPHOS 164*       U/A:    Lab Results   Component Value Date    COLORU YELLOW 02/20/2022    WBCUA 4 02/20/2022    RBCUA 2 02/20/2022    BACTERIA 3+ 01/15/2022    CLARITYU Clear 02/20/2022    SPECGRAV 1.011 02/20/2022    LEUKOCYTESUR Negative 02/20/2022    BLOODU SMALL 02/20/2022    GLUCOSEU Negative 02/20/2022       ABG    Lab Results   Component Value Date    HKE2WTP 16.0 08/30/2021    BEART -9.4 08/30/2021    Z3ZOBEJR 97.7 08/30/2021    PHART 7.309 08/30/2021    CXE0GXH 31.9 08/30/2021    PO2ART 92.7 08/30/2021    DQJ0XNG 38.1 08/30/2021       CULTURES  COVID: not detected      ASSESSMENT/PLAN:  ESRD on HD  - nephrology consult  - HD Mon-Wed-Fri    DM type 2  - monitor glucose. - Cont Lantus 10 units daily, Low dose SSI coverage. Neuropathy  - holding Lyrica    Leukocytosis  - repeat CBC    Hypertension  - BP hypotensive. Holding home regimen.   - Added Midodrine 10 mg TID as needed for SBP less than 90    Hyperlipidemia  - on Atorvastatin    H/o CVA  - can move all extremities; Right sided weakness  - on Aspirin, Atorvastatin. COPD  - stable. No AE  - albuterol prn    Chronic Diastolic CHF  - stable.  No s/s decompensation  - fluid management with HD    Psychosis  - management per psychiatry    Marlee Cooper FNP-C  3/2/2022 8:52 AM

## 2022-03-02 NOTE — FLOWSHEET NOTE
Senior Purposeful Rounding    Position:Left Side    Physical Environment:Room free from clutter    Pain Rating/ Nonverbal Pain Behaviors:denies;     Pain interventions Attempted: na    Patient Toileted:No- Void

## 2022-03-02 NOTE — PROGRESS NOTES
Treatment time: 210    Net UF: 0    Pre weight: 72.3  Post weight: 72.3    Access used: CVC  Access function: well    Medications or blood products given: none    Regular outpatient schedule: MWF    Summary of response to treatment: tolerated treatment well no issues vss kept even      Copy of dialysis treatment record placed in chart, to be scanned into EMR.

## 2022-03-02 NOTE — PROGRESS NOTES
4 Eyes Skin Assessment      The patient is being assessed for  Admission    I agree that 2 RN's have performed a thorough Head to Toe Skin Assessment on the patient. ALL assessment sites listed below have been assessed. Areas assessed for pressure by both nurses:   [x]   Head, Face, and Ears   [x]   Shoulders, Back, and Chest  [x]   Arms, Elbows, and Hands   [x]   Coccyx, Sacrum, and Ischum  [x]   Legs, Feet, and Heels                                Skin Assessed Under all Medical Devices by both nurses: bruising noted to bilateral legs. Abrasions noted to left shin. Bilateral arm bruising. Bruising to left hand noted. Redness noted behind left ear from mask usage. Does the Patient have Skin Breakdown related to pressure? redness noted behind left ear from utilizing face mask.              Robbie Prevention initiated:  Yes   Wound Care Orders initiated:  NA       [facility] nurse consulted for Pressure Injury (Stage 3,4, Unstageable, DTI, NWPT, Complex wounds)and New or Established Ostomies:  NA        Nurse 1 eSignature: Electronically signed by Mcneil Dakins, RN on 3/2/22 at 4:42 AM EST    **SHARE this note so that the co-signing nurse is able to place an eSignature**    Nurse 2 eSignature: Electronically signed by Stacy Valverde RN on 3/2/22 at 5:05 AM EST

## 2022-03-02 NOTE — BH NOTE
Uzma Solders returned from dialysis. She is resting in bed and does not want her dinner at this time.
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
## 2022-03-02 NOTE — PROGRESS NOTES
[facility] notified at [phone].  Patient was scheduled for 12;30pm. They are aware she is in hospital.

## 2022-03-02 NOTE — BH NOTE
Celina Mckinley is in the day room. She was transferred by Deborah Richey to a recliner. She participated in music group. Celina Mckinley is actively responding to internal stimuli. Celina Mckinley is cooperative with staff. Currently meeting with nephrologist.  Nephrologist reports that she will receive dialysis today.

## 2022-03-03 LAB
GLUCOSE BLD-MCNC: 136 MG/DL (ref 70–99)
GLUCOSE BLD-MCNC: 224 MG/DL (ref 70–99)
GLUCOSE BLD-MCNC: 240 MG/DL (ref 70–99)
PERFORMED ON: ABNORMAL

## 2022-03-03 PROCEDURE — 99233 SBSQ HOSP IP/OBS HIGH 50: CPT | Performed by: PSYCHIATRY & NEUROLOGY

## 2022-03-03 PROCEDURE — 97166 OT EVAL MOD COMPLEX 45 MIN: CPT

## 2022-03-03 PROCEDURE — 1240000000 HC EMOTIONAL WELLNESS R&B

## 2022-03-03 PROCEDURE — 6370000000 HC RX 637 (ALT 250 FOR IP): Performed by: NURSE PRACTITIONER

## 2022-03-03 PROCEDURE — 94761 N-INVAS EAR/PLS OXIMETRY MLT: CPT

## 2022-03-03 PROCEDURE — 97530 THERAPEUTIC ACTIVITIES: CPT

## 2022-03-03 PROCEDURE — 6370000000 HC RX 637 (ALT 250 FOR IP): Performed by: PSYCHIATRY & NEUROLOGY

## 2022-03-03 PROCEDURE — 97116 GAIT TRAINING THERAPY: CPT

## 2022-03-03 PROCEDURE — 97162 PT EVAL MOD COMPLEX 30 MIN: CPT

## 2022-03-03 PROCEDURE — 97535 SELF CARE MNGMENT TRAINING: CPT

## 2022-03-03 RX ORDER — HALOPERIDOL 1 MG/1
2 TABLET ORAL 2 TIMES DAILY
Status: DISCONTINUED | OUTPATIENT
Start: 2022-03-03 | End: 2022-03-05

## 2022-03-03 RX ADMIN — HALOPERIDOL 2 MG: 1 TABLET ORAL at 20:10

## 2022-03-03 RX ADMIN — INSULIN GLARGINE 10 UNITS: 100 INJECTION, SOLUTION SUBCUTANEOUS at 08:56

## 2022-03-03 RX ADMIN — INSULIN LISPRO 2 UNITS: 100 INJECTION, SOLUTION INTRAVENOUS; SUBCUTANEOUS at 08:57

## 2022-03-03 RX ADMIN — INSULIN LISPRO 2 UNITS: 100 INJECTION, SOLUTION INTRAVENOUS; SUBCUTANEOUS at 17:20

## 2022-03-03 RX ADMIN — INSULIN LISPRO 1 UNITS: 100 INJECTION, SOLUTION INTRAVENOUS; SUBCUTANEOUS at 20:09

## 2022-03-03 RX ADMIN — ATORVASTATIN CALCIUM 40 MG: 40 TABLET, FILM COATED ORAL at 20:09

## 2022-03-03 RX ADMIN — ASPIRIN 81 MG: 81 TABLET, COATED ORAL at 08:56

## 2022-03-03 RX ADMIN — PREGABALIN 75 MG: 25 CAPSULE ORAL at 20:10

## 2022-03-03 RX ADMIN — HALOPERIDOL 5 MG: 10 TABLET ORAL at 08:55

## 2022-03-03 ASSESSMENT — PAIN SCALES - GENERAL: PAINLEVEL_OUTOF10: 0

## 2022-03-03 ASSESSMENT — PAIN SCALES - WONG BAKER: WONGBAKER_NUMERICALRESPONSE: 0

## 2022-03-03 NOTE — FLOWSHEET NOTE
Senior Purposeful Rounding    Position:Back    Physical Environment:Room free from clutter, Clear path to bathroom , Adequate lighting and Bed alarm functioning    Pain Rating/ Nonverbal Pain Behaviors:0    Pain interventions Attempted: Noted    Patient Toileted:Incontinent of bowel and bladder. Care provided as needed.

## 2022-03-03 NOTE — PROGRESS NOTES
Inpatient Geriatric  Physical Therapy Evaluation and Treatment    Unit:  [facility]  Date:  3/3/2022  Patient Name:    Gorge Bain  Admitting diagnosis:  Psychosis, unspecified psychosis type [facility]) Giuseppe Bianchi Date:  3/2/2022  Precautions/Restrictions/Medical Indications    Standard BHI Precautions  Fall Risk  History of current Episode: Per [place] [address] APRN H&P: \"The patient is a 55 y.o. female with DM type 2, neuropathy, h/o CVA, hypertension, hyperlipidemia, COPD, ESRD on HD, CHF, insomnia, and depression who presented to [facility] with complaint of hallucinations and aggressive behavior. \"  Per pt - first CVA about 2 years ago, with R sided deficits, almost full recovery of RUE/RLE but left legally blind; second CVA in Feb of this year, for which she had gone to [facility] before being admitted to hospital. Pt reports she has not transferred or ambulated with walker yet in rehab at [facility]. Per chart review - pt was at [address] [place] [address] from ~1/07-1/20/22. Admitted there following a stay at [facility] for frequent falls at home. Met 4/5 short term PT goals; no long term goals documented. Treatment Time:  10:35-11:32  Treatment Number:  1       Discharge Recommendations from PHYSICAL perspective:                                          ARU/ IRF (inpatient rehab facility)    DME needs for discharge:     Needs Met     Therapy recommendations for staff:   Assist of 1 (contact guard assist) with use of rolling walker (RW) for all transfers and ambulation   within room  to/from community room   Therapy recommendation for EMS Transport: can transport by wheelchair     Home Health S4 Level: NA        AM-PAC Mobility Score         AM-PAC Inpatient Mobility without Stair Climbing Raw Score : 17    Preadmission Environment    Pt. Lives with family ()  Was sent to hospital from [facility]. Started there in late February after her 2nd CVA.  First stroke affected R side 2 years ago when she lost her vision. Second CVA impaired her balance. Her falls had begun prior to her first CVA  Home environment:    one story home  Steps to enter first floor: No steps  Steps to second floor: N/A  Bathroom: walk in shower, grab bars and shower seat with back, standard height toilet with grab bar  Equipment owned: RW, shower chair/bench, reacher and lifealert     Preadmission Status:  Pt. Able to drive: No  Pt Fully independent with ADLs: No- supervises for shower  Pt. Required assistance from family for: Cleaning, Cooking, Dressing and Laundry supervision with shower  Pt. independent for transfers and gait and walked with Chino Santillan  History of falls Yes-falls often, sometimes 10x/wk. Standing is difficult due to decreased endurance. Pt fatigues. Subjective:   Pt agreeable to evaluation and treatment as needed. Pain   No    Cognition    A&O to Person and Time  Able to follow:  2 step commands    Upper Extremity ROM/Strength  Deferred to OT evaluation: please see OT note    Lower Extremity ROM / Strength (use of 5/5 scale if strength formally assessed)    AROM: WFL    Right LE  quads    4+                                       ant tibs  4-                                                hams          4-                                               iliopsoas   3+                  LEFT LE   quads     4+     ant tibs       4-     hams         4-    iliopsoas    3+  Sensation       Light touch:      Diminished L LE and R LE  Proprioception:    Impaired L LE and R LE    Coordination Testing: Alternating pronation/supination:  NT  Heel to shin (sitting or supine):      WFL  Alternating Toe Tapping:       WFL    Tone     WNL R UE, L UE, L LE and R LE    Trunk Control   Good    Vision:   Impaired  Comments: Pt reports having \"80%\" vision, blurry at all distances, better in bright light. Not formally tested.     Hearing:   [employer]    Balance  Static Sitting:  Good   Dynamic Sitting: Good   Static Standing:  Fair +  Dynamic Standing: Fair      Balance Functional Outcome Measure  Measure Used:  Tinetti  Date Assessed: 3/3/22  Score: 12    Indication for Romberg: postive test = proprioceptive deficit  Indications for CHERRY: Risk of Falls []41-55 = low, []21-40 = medium, []0-20 = high   Indications for Tinetti: Risk of Falls:   []Low (> 24)   []Mod (19-23)   [x]High (< 18)  Indications for DGI:  [] minimal to no risk (> 21), [] risk of falls (<21)    Bed mobility    Rolling to left:    Supervision  Rolling to right:   Supervision  Scooting in supine:   Not Tested  Scooting in sitting:   Supervision  Supine to sit with HOB flat:  Supervision  Sit to supine with HOB flat:  Supervision    Transfers    Sit to stand:  CGA  From EOB with STEDY x 2 trials    Min A from EOB with RW     CGA from std height toilet with RW, using left handrail for support  Stand to sit:  CGA to bed from STEDY x 2 trials    CGA to toilet from RW with use of left handrail  Bed to chair:  Not Tested    Toileting  Sit>stand from toilet (standard height):  CGA  Stand>sit to toilet (standard height):   CGA  Marianna-care:      Supervision, seated, good weight shifting  Hand hygiene:     Not Tested  LE pants management: Mod A to don new brief, CGA to pull brief up from mid-thigh level once standing. Gait gait completed as indicated below  Deviations (firm surface/linoleum):  decreased noble, narrow ROMAINE, decreased step length bilaterally and decreased eccentric control, discontinuous step pattern with frequent brief pauses, mild M/L sway in bilat stance.   Level of assist:    [facility]  Assistive Device Used:    rolling walker (RW)  Distance:  10 ft + 30 ft  Cued on: continuous step pattern    Stair Training deferred, pt unsafe/ not appropriate to complete stairs at this time    WC mobility   NA    Activity Tolerance   No adverse symptoms noted w/activity    Positioning Needs   Pt up in chair, alarm set, positioned in proper neutral alignment and pressure relief provided. Needs within reach. Within line of sight of nursing staff. In community room with group. Therapeutic Exercises Initiated    Exercises in BOLD performed in unit today. Items not bolded are carried forward from prior visits for continuity of the record. Exercise/Equipment Resistance/Repetitions Other comments                                        Patient/Family Education   Educated on importance of continued strengthening to facilitate functional return  Educated on importance of continued activity   Educated on proper gait pattern and RW distance  Educated on role of PT, POC as well as importance of continued activity    Patients response to evaluation/treatment:   Pt tolerated treatment well    Impairments/ Deficits  Decreased trunk control, Decreased strength, Positive neurological findings, Abnormality of gait, Decreased balance, Decreased proprioception and Decreased functional status below baseline    Assessment of Deficits  Pt is 55year old female presenting within the [facility] behavioral health institute at [facility] with medical diagnosis of Psychosis, unspecified psychosis type ([facility] [address] [F29]. Presents today with limited ability to perform ADLs, gait abnormality , decreased transfer ability  and poor balance. Would benefit from continued IP PT to address below impairments and restore function. Recommending ARU/IRF/Inpatient Rehab Facility upon discharge as patient functioning well below baseline, demonstrates good rehab potential and unable to return home due to burden of care beyond caregiver ability, home environment not conducive to patient recovery and limited safety awareness. Goals :   Patient Goals for Therapy: Trial walking with walker. Agreeable to further skilled therapy upon d/c, open to consider SNF or ARU. To be met in 3 visits:  1). Demonstrate improved safety awareness with functional mobility requiring less overall cueing. To be met in 6 visits:  1). Supine to/from sit  Independent to promote return to functional ADLs  2). Sit to/from stand Modified Independent to promote return to functional ADLs  3). Gait: Ambulate 150 ft. with Modified Independent and use of rolling walker (RW) demonstrating improved gait pattern  4). Tolerate B LE exercises 3 sets of 10-15 reps to improve strength   5). Tolerated standing balance exercises with improved balance to Good -  grade    Rehabilitation Potential    Good  Strengths for achieving goals include:   Pt motivated, PLOF, Family Support and Pt cooperative  Barriers to achieving goals include:    None      Plan    To be seen 3-5x/week while in [facility] setting for   Therapeutic Exercise, Neuromuscular Re-Education, Gait training and Therapeutic Activity     Timed Code Treatment Minutes: 47 minutes  Total Treatment Time:  57 minutes    Raquel Wei, PT, DPT    If patient discharges from this facility prior to next visit, this note will serve as the Discharge Summary.

## 2022-03-03 NOTE — PLAN OF CARE
[address] [place] [address]  Initial Interdisciplinary Treatment Plan NOTE    Review Date & Time: 3/3/22 0950    Patient was not in treatment team    Admission Type:        Reason for admission:  Reason for Admission: psychosis      Estimated Length of Stay Update:  3-5 days  Estimated Discharge Date Update: 3/6/22-3/8/22    EDUCATION:   Learner Progress Toward Treatment Goals: Reviewed results and recommendations of this team    Method: Individual    Outcome: Verbalized understanding    PATIENT GOALS: none expressed    PLAN/TREATMENT RECOMMENDATIONS UPDATE:evaluate and treat      GOALS UPDATE:   Time frame for Short-Term Goals: 2 days    Grace Majano RN

## 2022-03-03 NOTE — FLOWSHEET NOTE
Senior Purposeful Rounding    Position:Back and Repositions Self    Physical Environment:Room free from clutter, Clear path to bathroom , Adequate lighting, Bed alarm functioning and No safety hazards noted    Pain Rating/ Nonverbal Pain Behaviors:0, Rests with eyes closed. Calm expression. Respirations regular and even. Pain interventions Attempted: None    Patient Toileted:Incontinent of bowel and bladder requires assistance with care as needed.

## 2022-03-03 NOTE — FLOWSHEET NOTE
Senior Purposeful Rounding    Position:Back and Repositions Self    Physical Environment:Room free from clutter, Clear path to bathroom , Adequate lighting, Bed alarm functioning and No safety hazards noted    Pain Rating/ Nonverbal Pain Behaviors:0    Pain interventions Attempted: None    Patient Toileted:Incontinent of bowel and bladder. Care provided as needed.

## 2022-03-03 NOTE — PLAN OF CARE
Problem: Skin Integrity:  Goal: Will show no infection signs and symptoms  Description: Will show no infection signs and symptoms  Outcome: Ongoing     Problem: Skin Integrity:  Goal: Absence of new skin breakdown  Description: Absence of new skin breakdown  Outcome: Ongoing     Problem: Falls - Risk of:  Goal: Will remain free from falls  Description: Will remain free from falls  Outcome: Ongoing     Problem: Falls - Risk of:  Goal: Absence of physical injury  Description: Absence of physical injury  Outcome: Ongoing     Problem: Altered Mood, Psychotic Behavior:  Goal: Able to demonstrate trust by eating, participating in treatment and following staff's direction  Description: Able to demonstrate trust by eating, participating in treatment and following staff's direction  Outcome: Ongoing     Problem: Altered Mood, Psychotic Behavior:  Goal: Able to verbalize decrease in frequency and intensity of hallucinations  Description: Able to verbalize decrease in frequency and intensity of hallucinations  Outcome: Ongoing     Problem: Altered Mood, Psychotic Behavior:  Goal: Ability to achieve adequate nutritional intake will improve  Description: Ability to achieve adequate nutritional intake will improve  3/3/2022 0426 by Jesus Vazquez RN  Outcome: Ongoing     Problem: Altered Mood, Psychotic Behavior:  Goal: Ability to interact with others will improve  Description: Ability to interact with others will improve  Outcome: Ongoing     Pt medication compliant. Pleasantly confused. Unable to make needs known & unaware of safety. RTIS. Speaks with a type of word salad. Unable to appropriately socialize with others, or unable to have meaningful conversation. Able to redirect Pt to provide care. Free of falls or injury. Able to move and turn self without awareness of doing such. Free of s/s of discomfort or distress. Able to feed self finger foods.

## 2022-03-03 NOTE — FLOWSHEET NOTE
Senior Purposeful Rounding    Position:Left Side    Physical Environment:Room free from clutter, Clear path to bathroom , Adequate lighting, Bed alarm functioning and No safety hazards noted    Pain Rating/ Nonverbal Pain Behaviors:0, rests with eyes closed. Calm expression. Respirations regular and even. Pain interventions Attempted: None    Patient Toileted:No- Void free of incontinence.

## 2022-03-03 NOTE — GROUP NOTE
Group Therapy Note    Date: 3/3/2022    Group Start Time: 1100  Group End Time: 1150  Group Topic: Psychoeducation    [address] [facility],[address], [place]        Group Therapy Note    Attendees: 3    Participants learned about grounding techniques and reviewed coping skills. Notes:  Maddi Sauceda attended group and was much more clear today than yesterday. She talked about past coping skills of using yoga and the 5,4,3,2,1 grounding technique. She also reminisced about [place] farm and taking care of the animals. She enjoyed using [employer] during group.      Status After Intervention:  Improved    Participation Level: Interactive    Participation Quality: Appropriate, Attentive and Sharing      Speech:  normal      Thought Process/Content: Linear      Affective Functioning: Congruent      Mood: euthymic      Level of consciousness:  Attentive      Response to Learning: Able to verbalize current knowledge/experience and Progressing to goal      Endings: None Reported    Modes of Intervention: Education and Activity      Discipline Responsible: /Counselor      Signature:  [address] Nix, ATIF

## 2022-03-03 NOTE — FLOWSHEET NOTE
Senior Purposeful Rounding    Position:Back    Physical Environment:Room free from clutter, Clear path to bathroom , Adequate lighting, Bed alarm functioning and No safety hazards noted    Pain Rating/ Nonverbal Pain Behaviors:denies    Pain interventions Attempted: None    Patient Toileted:Incontinent of bowel and bladder requires assistance as needed.

## 2022-03-03 NOTE — GROUP NOTE
Group Therapy Note    Date: 3/3/2022    Group Start Time: 1330  Group End Time: 1705  Group Topic: Cognitive Skills    [address] [facility]        Group Therapy Note    Attendees: 4    Group members engaged in a TV show theme song trivia game of both the 70's and 80's. Notes:  Kristine attended group for the full duration. Matias Jean Baptiste remained engaged and interacted with other members of the group. Status After Intervention:  Improved    Participation Level:  Active Listener and Interactive    Participation Quality: Appropriate      Speech:  normal      Thought Process/Content: Logical      Affective Functioning: Congruent      Mood: euthymic      Level of consciousness:  Alert      Response to Learning: Able to verbalize current knowledge/experience      Endings: None Reported    Modes of Intervention: Socialization and Activity      Discipline Responsible: [facility]      Signature:  LEYDI Loredo

## 2022-03-03 NOTE — PROGRESS NOTES
Per PT/OT, pt has been using the walker to ambulate w/ contact guard assist and gait belt. Pt was being assisted by a staff member in ambulating w/ her walker and gait belt to the shower. Pt was walking steadily w/ contact guard assist when she began to feel dizzy and started to fall sideways. Staff member walking w/ pt was able to use gait belt to keep pt upright and stabilize pt until more staff could come to help get her into a chair. Vital signs taken. BP dropped to 87/59 at this time, other VSS 96.4, 80 bpm, 95%. BS was 240. Medical notified. Pt denied any pain, no s/s of injuries. Pt was alert throughout incident. Staff then used the stedy the rest of the way to the shower. OT present at this time and stated to have pt use stedy if going any distance further than to the bathroom or to dayroom from her room. BP retaken after shower 99/62. Will continue to monitor.

## 2022-03-03 NOTE — BH NOTE
Purposeful Rounding    Patient concerns reported:none noted    Nurse made aware:no    Patient Turned and repositioned: Independent    Patient Toileted: Continent    Fall Precautions in Place: Yellow non-skid socks on, Bed alarm on and functioning properly, Bed locked in low position, Lighting appropriate, Room free of clutter and Clear path to bathroom      Electronically signed by Estelle Burroughs on 3/3/22 at 5:02 PM EST

## 2022-03-03 NOTE — FLOWSHEET NOTE
Senior Purposeful Rounding    Position:Right Side and Repositions Self    Physical Environment:Room free from clutter, Clear path to bathroom , Adequate lighting, Bed alarm functioning and No safety hazards noted    Pain Rating/ Nonverbal Pain Behaviors:0    Pain interventions Attempted: None    Patient Toileted:Incontinent of bowel and bladder assistance as needed.

## 2022-03-03 NOTE — PROGRESS NOTES
Inpatient Occupational Therapy  Evaluation and Treatment    Unit:   [facility]  Date:  3/3/2022  Patient Name:    Faisal Johnson  Admitting diagnosis:  Psychosis, unspecified psychosis type [facility]) Alexa Coronado Date:  3/2/2022  Precautions/Restrictions/WB Status/ Lines/ Wounds/ Oxygen:  Standard [facility] Precautions  Fall Risk, impaired vision- pt reports she is blind, but can see shapes in well-lit areas  History of Present Illness: Per RIVERA Mcelroy's 3/2/22 note, The patient is a 55 y.o. female with DM type 2, neuropathy, h/o CVA, hypertension, hyperlipidemia, COPD, ESRD on HD, CHF, insomnia, and depression who presented to [facility] with complaint of hallucinations and aggressive behavior. Patient was seen and evaluated in the ED by the ED medical provider, patient was medically cleared for admission to [facility] at [facility].       Per chart review - pt was at [address] [place] [address] from ~1/07-1/20/22. Admitted there following a stay at [place] for frequent falls at home. Treatment Number:  1    Treatment Time: 4685-7268  Timed Code Treatment Minutes:   29  minutes   Total Treatment Time:    39  minutes    Staff Recommendations:    Assist of 1 (contact guard assist) with use of rolling walker (RW) for all transfers and ambulation   within room  to/from community room       Patient Goals for Therapy:  \" Get more therapy \"    Discharge Recommendations:  ARU/IRF    DME needs for discharge:      Needs Met     AM-PAC Score:   14  Home Health S4 Level: NA    MOCA:  To be assessed    Preadmission Environment    Pt. Lives with family ()  Was sent to hospital from [facility]. Started there in late February after her 2nd CVA. First stroke affected R side 2 years ago when she lost her vision. Second CVA impaired her balance.   Her falls had begun prior to her first CVA  Home environment:  one story home  Steps to enter first floor: No steps  Steps to second floor: N/A  Bathroom: walk in shower, grab bars and shower seat with back, standard height toilet with grab bar  Equipment owned: RW, shower chair/bench, reacher and lifealert    Preadmission Status:  Pt. Able to drive: No  Pt Fully independent with ADLs: No- supervises for shower  Pt. Required assistance from family for: Cleaning, Cooking, Dressing and Laundry supervision with shower  Pt. independent for transfers and gait and walked with Shirley Dove  History of falls Yes-falls often, sometimes 10x/wk. Standing is difficult due to decreased endurance. Pt fatigues. Sleep Hygiene: To be assessed  Income: To be assessed  Financial Management: To be assessed  Leisure Interests: To be assessed  Medication Management: To be assessed  Health Management: To be assessed  Social Network: To be assessed  Stressors: To be assessed  Coping Skills: To be assessed    Pain   No  Rating:NA  Location: NA  Pain Medicine Status: No request made      Cognition    A&O to Person, month, and year, situation  Able to follow:  2 step commands    Upper Extremity ROM:    WFL, pt able to perform all bed mobility, transfers, and gait without ROM limitation. Upper Extremity Strength:    WFL, pt able to perform all bed mobility, transfers, and gait without strength limitation. Upper Extremity Sensation:    No apparent deficits. Upper Extremity Proprioception:    No apparent deficits. Skin Integrity:  No issues noted     Coordination and Tone:  No problem noted during evaluation    Balance  Static Sitting:  Good   Dynamic Sitting: Good -   Static Standing:  Fair +  Dynamic Standing: Fair     Bed mobility:    Supine to sit:   Supervision  Sit to supine:   Not Tested  Scooting to head of bed: Not Tested   Scooting in sitting:  Supervision  Rolling:   Not Tested  Bridging:   Not Tested    Transfers:    Sit to stand:   Min A  Stand to sit:   Min A  Bed/Chair to/from toilet: Min A with RW and gait belt    Self Care:   Toileting: Min A with urination  Grooming: Not Tested  Dressing: Mod A with LB dressing, Min A to CGA for standing balance    Exercise / Activities Initiated:   Pt. Educated on role of OT. Pt. Participated in:toileting and functional mobility    Assessment of Deficits:   Pt demonstrated decreased activity tolerance, decreased safety awareness, decreased balance,  decreased bed mobility, decreased transfer skills, decreased coping skills, limited education    Pt. Limited during evaluation by . . . Impaired vision    At end of evaluation, pt. In bathroom with PT. Goal(s) : To be met in 3 Visits:  1). Pt. To complete interest check list.   2). Pt will participate in [facility] REHABILITATION assessment. 3). Pt will perform 2 grooming tasks with setup. To be met in 5 Visits:  1). Supine to Sit IND  2). Bed to Chair/toilet with supervision  3). Upper Body Dressing with setup  4). Lower Body Dressing with Min A  5). Pt to carla UE exs [id number]  6). Pt will verbalize 3 coping skills    Rehabilitation Potential:  Good for goals listed above. Strengths for achieving goals include:  PLOF  Barriers to achieving goals include: Decreased coping skills and Limited education    Plan: To be seen 2-5x/week while in acute care setting for therapeutic exercises/act, ADL retraining, NMR and patient/caregiver ed/instruction.        Signature and License Number  Kristi Nash [address] 87, OTR/L  #[id number]           If patient discharges from this facility prior to next visit, this note will serve as the Discharge Summary

## 2022-03-03 NOTE — BH NOTE
Purposeful Rounding    Patient concerns reported*none noted    Nurse made aware:no    Patient Turned and repositioned: Independent    Patient Toileted: Continent    Fall Precautions in Place: Yellow non-skid socks on, Bed alarm on and functioning properly, Bed locked in low position, Lighting appropriate, Room free of clutter and Clear path to bathroom      Electronically signed by Freddy Carr on 3/3/22 at 10:07 AM EST

## 2022-03-04 LAB
ALBUMIN SERPL-MCNC: 3.7 G/DL (ref 3.4–5)
ANION GAP SERPL CALCULATED.3IONS-SCNC: 15 MMOL/L (ref 3–16)
BUN BLDV-MCNC: 41 MG/DL (ref 7–20)
CALCIUM SERPL-MCNC: 9.5 MG/DL (ref 8.3–10.6)
CHLORIDE BLD-SCNC: 99 MMOL/L (ref 99–110)
CO2: 23 MMOL/L (ref 21–32)
CREAT SERPL-MCNC: 6.6 MG/DL (ref 0.6–1.1)
GFR AFRICAN AMERICAN: 8
GFR NON-AFRICAN AMERICAN: 7
GLUCOSE BLD-MCNC: 197 MG/DL (ref 70–99)
GLUCOSE BLD-MCNC: 262 MG/DL (ref 70–99)
GLUCOSE BLD-MCNC: 86 MG/DL (ref 70–99)
GLUCOSE BLD-MCNC: 97 MG/DL (ref 70–99)
HCT VFR BLD CALC: 36.6 % (ref 36–48)
HEMOGLOBIN: 11.5 G/DL (ref 12–16)
MCH RBC QN AUTO: 26.1 PG (ref 26–34)
MCHC RBC AUTO-ENTMCNC: 31.3 G/DL (ref 31–36)
MCV RBC AUTO: 83.5 FL (ref 80–100)
PDW BLD-RTO: 19.7 % (ref 12.4–15.4)
PERFORMED ON: ABNORMAL
PERFORMED ON: ABNORMAL
PERFORMED ON: NORMAL
PHOSPHORUS: 5.9 MG/DL (ref 2.5–4.9)
PLATELET # BLD: 316 K/UL (ref 135–450)
PMV BLD AUTO: 7.8 FL (ref 5–10.5)
POTASSIUM SERPL-SCNC: 4.4 MMOL/L (ref 3.5–5.1)
RBC # BLD: 4.39 M/UL (ref 4–5.2)
SODIUM BLD-SCNC: 137 MMOL/L (ref 136–145)
WBC # BLD: 11.6 K/UL (ref 4–11)

## 2022-03-04 PROCEDURE — 6370000000 HC RX 637 (ALT 250 FOR IP): Performed by: NURSE PRACTITIONER

## 2022-03-04 PROCEDURE — 85027 COMPLETE CBC AUTOMATED: CPT

## 2022-03-04 PROCEDURE — 94640 AIRWAY INHALATION TREATMENT: CPT

## 2022-03-04 PROCEDURE — 1240000000 HC EMOTIONAL WELLNESS R&B

## 2022-03-04 PROCEDURE — 99233 SBSQ HOSP IP/OBS HIGH 50: CPT | Performed by: PSYCHIATRY & NEUROLOGY

## 2022-03-04 PROCEDURE — 6370000000 HC RX 637 (ALT 250 FOR IP): Performed by: PSYCHIATRY & NEUROLOGY

## 2022-03-04 PROCEDURE — 80069 RENAL FUNCTION PANEL: CPT

## 2022-03-04 PROCEDURE — 99231 SBSQ HOSP IP/OBS SF/LOW 25: CPT | Performed by: NURSE PRACTITIONER

## 2022-03-04 PROCEDURE — 36415 COLL VENOUS BLD VENIPUNCTURE: CPT

## 2022-03-04 PROCEDURE — 90935 HEMODIALYSIS ONE EVALUATION: CPT

## 2022-03-04 RX ADMIN — Medication 2 PUFF: at 07:49

## 2022-03-04 RX ADMIN — INSULIN LISPRO 1 UNITS: 100 INJECTION, SOLUTION INTRAVENOUS; SUBCUTANEOUS at 17:27

## 2022-03-04 RX ADMIN — HALOPERIDOL 2 MG: 1 TABLET ORAL at 20:37

## 2022-03-04 RX ADMIN — ATORVASTATIN CALCIUM 40 MG: 40 TABLET, FILM COATED ORAL at 20:37

## 2022-03-04 RX ADMIN — HALOPERIDOL 2 MG: 1 TABLET ORAL at 09:31

## 2022-03-04 RX ADMIN — INSULIN LISPRO 2 UNITS: 100 INJECTION, SOLUTION INTRAVENOUS; SUBCUTANEOUS at 20:37

## 2022-03-04 RX ADMIN — PREGABALIN 75 MG: 25 CAPSULE ORAL at 20:37

## 2022-03-04 RX ADMIN — MIDODRINE HYDROCHLORIDE 10 MG: 5 TABLET ORAL at 09:31

## 2022-03-04 RX ADMIN — INSULIN GLARGINE 10 UNITS: 100 INJECTION, SOLUTION SUBCUTANEOUS at 09:32

## 2022-03-04 RX ADMIN — TIOTROPIUM BROMIDE AND OLODATEROL 2 PUFF: 3.124; 2.736 SPRAY, METERED RESPIRATORY (INHALATION) at 07:47

## 2022-03-04 NOTE — PROGRESS NOTES
[employer]  Nephrology Follow up Note           Reason for Consult: ESRD  Requesting Physician:  Dr. Sierra Schroeder history  Seen and examined at HD on 3/4/22 w 1l UFG, pre wt 75.8 kg     ROS: No chest pain/shortness of breath/fever/nausea/vomiting  PSFH: No visitor    Scheduled Meds:   haloperidol  2 mg Oral BID    aspirin  81 mg Oral Daily    atorvastatin  40 mg Oral Nightly    tiotropium-olodaterol  2 puff Inhalation Daily    insulin glargine  10 Units SubCUTAneous QAM    insulin lispro  0-6 Units SubCUTAneous TID WC    insulin lispro  0-3 Units SubCUTAneous Nightly    pregabalin  75 mg Oral Nightly     Continuous Infusions:   dextrose       PRN Meds:.acetaminophen, nicotine polacrilex, albuterol sulfate HFA, glucose, glucagon (rDNA), dextrose, dextrose bolus (hypoglycemia) **OR** dextrose bolus (hypoglycemia), midodrine, benztropine mesylate, heparin (porcine)    History of Present Illness on 3/2/2022:    55 y.o. yo female with PMH of diabetes, hypertension, Legal blindness, diabetic neuropathy, hyperlipidemia, CVA, ESRD on HD Monday Wednesday Friday at [facility] who is admitted [facility] unit for psychosis.   Nephrology has been consulted for dialysis needs    Physical exam:   Constitutional:  VITALS:  /70   Pulse 76   Temp 98.6 °F (37 °C)   Resp 18   Ht 5' 7\" (1.702 m)   Wt 167 lb 1.7 oz (75.8 kg)   LMP 02/06/2022 (Within Months)   SpO2 98%   BMI 26.17 kg/m²   Gen: alert, awake  Neck: No JVD  Skin: Unremarkable  Cardiovascular:  S1, S2 without m/r/g   Respiratory: CTA B without w/r/r; respiratory effort normal  Abdomen:  soft, nt, nd,   Extremities: no lower extremity edema  Neuro/Psy: AAoriented times 3 ; moves all 4 ext  Dialysis access right upper extremity AV fistula  Created on 2/10/22 ( Dr Alyson Snyder); [facility]     Data/  Recent Labs     03/04/22  0514   WBC 11.6*   HGB 11.5*   HCT 36.6   MCV 83.5        Recent Labs     03/04/22  0514      K 4.4   CL 99   CO2 23   GLUCOSE 97   PHOS 5.9*   BUN 41*   CREATININE 6.6*   LABGLOM 7*   GFRAA 8*       Assessment  -ESRD on HD Monday Wednesday Friday at [facility] followed by by Dr. Sulema Trujillo group     -Psychosis for psychiatry    -Hypertension   Currently hypotensive   -Anemia, above goal    -History of hyperkalemia    -CKD/MBD    Plan  -HD MWF per schedule   TW will be post wt from 3/4  -hold lisinopril  -avoid BP and blood draws from RUE  -renal diet  -Midodrine for hypotension for SBP<90 n prn during HD   -Renal dose medications    Thank you for the consultation. Please do not hesitate to call with questions. Shiv Cody MD  Office: [phone]  Fax:    [phone] [address] [facility]

## 2022-03-04 NOTE — FLOWSHEET NOTE
Senior Purposeful Rounding    Position:Back    Physical Environment:Room free from clutter, Clear path to bathroom , Adequate lighting and Bed alarm functioning    Pain Rating/ Nonverbal Pain Behaviors:0;     Pain interventions Attempted: asleep    Patient Toileted:Continent

## 2022-03-04 NOTE — FLOWSHEET NOTE
Senior Purposeful Rounding    Position:Back    Physical Environment:Room free from clutter, Clear path to bathroom , Adequate lighting and Bed alarm functioning    Pain Rating/ Nonverbal Pain Behaviors:0;     Pain interventions Attempted: asleep    Patient Toileted:Incontinent of bladder

## 2022-03-04 NOTE — FLOWSHEET NOTE
2Senior Purposeful Rounding    Position:Back and Repositions Self    Physical Environment:Room free from clutter, Clear path to bathroom , Adequate lighting and No safety hazards noted    Pain Rating/ Nonverbal Pain Behaviors:denies     Pain interventions Attempted: none needed    Patient Toileted:Continent

## 2022-03-04 NOTE — PROGRESS NOTES
Progress Note    Admit Date:  3/2/2022    Subjective:  Ms. Luigi Gant seen in HD. Denies complaints. Objective:   Patient Vitals for the past 4 hrs:   BP Temp Pulse Resp Weight   03/04/22 1427 116/62 -- 71 16 --   03/04/22 1400 139/70 98 °F (36.7 °C) 69 16 165 lb 5.5 oz (75 kg)   03/04/22 1046 131/70 98.6 °F (37 °C) 76 18 167 lb 1.7 oz (75.8 kg)            Intake/Output Summary (Last 24 hours) at 3/4/2022 1434  Last data filed at 3/4/2022 1400  Gross per 24 hour   Intake 1040 ml   Output 1800 ml   Net -760 ml       Physical Exam:    Gen: No distress. Alert. Eyes: PERRL. No sclera icterus. No conjunctival injection. ENT: No discharge. Pharynx clear. Neck: Trachea midline. Resp: No accessory muscle use. No crackles. No wheezes. No rhonchi. CV: Regular rate. Regular rhythm. No murmur.  No rub. No edema. Right IJ tunneled dialysis catheter  GI: Non-tender. Non-distended. Normal bowel sounds. Skin: Warm and dry. No nodule on exposed extremities. No rash on exposed extremities. Right AV Fistula + bruit/thrill  M/S: No cyanosis. No joint deformity. No clubbing. Neuro: Awake. able to ambulate. Moves all extremities; follows commands. Right sided weakness from previous CVA  Psych: Per psychiatry team evaluation     Data:  CBC:   Recent Labs     03/04/22  0514   WBC 11.6*   HGB 11.5*   HCT 36.6   MCV 83.5        BMP:   Recent Labs     03/04/22  0514      K 4.4   CL 99   CO2 23   PHOS 5.9*   BUN 41*   CREATININE 6.6*     LIVER PROFILE: No results for input(s): AST, ALT, LIPASE, BILIDIR, BILITOT, ALKPHOS in the last 72 hours. Invalid input(s): AMYLASE,  ALB  PT/INR: No results for input(s): PROTIME, INR in the last 72 hours. CULTURES  COVID: not detected       Assessment/Plan:  ESRD on HD  - nephrology consult  - HD Mon-Wed-Fri     DM type 2  - monitor glucose.   - Cont Lantus 10 units daily, Low dose SSI coverage.      Neuropathy  - holding Lyrica     Leukocytosis  - repeat CBC 11.6     Hypertension  - BP hypotensive. Holding home regimen.   - Added Midodrine 10 mg TID as needed for SBP less than 90     Hyperlipidemia  - on Atorvastatin     H/o CVA  - can move all extremities; Right sided weakness  - on Aspirin, Atorvastatin.      COPD  - stable. No AE  - albuterol prn     Chronic Diastolic CHF  - stable. No s/s decompensation  - fluid management with HD     Psychosis  - management per psychiatry     Diet: ADULT DIET; Regular; 4 carb choices (60 gm/meal); Low Potassium (Less than 3000 mg/day);  Low Phosphorus (Less than 1000 mg); 60 to 80 gm  Code Status: Full Code    Brook Rubinstein FNP-C  3/4/2022

## 2022-03-04 NOTE — PROGRESS NOTES
Pt's BP was 80/55 this AM at the start of the shift. Given midodrine @ 0931 per parameters of SBP > 90. This was effective. When BP was rechecked, pt was /62. Will continue to monitor.

## 2022-03-04 NOTE — PROGRESS NOTES
Treatment time: 210    Net UF: 1000    Pre weight: 75.8  Post weight: 75  EDW: 75  NEW DRY WT    Access used: R CVC  Access function: well    Medications or blood products given: none    Regular outpatient schedule: TEM[facility] 553    Summary of response to treatment: tolerated treatment well no issue   vss remove 1liter    Copy of dialysis treatment record placed in chart, to be scanned into EMR.

## 2022-03-04 NOTE — PLAN OF CARE
Problem: Altered Mood, Psychotic Behavior:  Goal: Able to demonstrate trust by eating, participating in treatment and following staff's direction  Description: Able to demonstrate trust by eating, participating in treatment and following staff's direction  Outcome: Ongoing  Goal: Able to verbalize decrease in frequency and intensity of hallucinations  Description: Able to verbalize decrease in frequency and intensity of hallucinations  Outcome: Ongoing  Goal: Able to verbalize reality based thinking  Description: Able to verbalize reality based thinking  Outcome: Ongoing  Goal: Ability to interact with others will improve  Description: Ability to interact with others will improve  Outcome: Ongoing    Pt is alert and oriented x4. Pt has been pleasant, calm, and cooperative. Appears clearer today, is able to answer questions and interact appropriately. Denies any delusions, shows insight into delusions she had at admission. Pt is brighter, smiling and joking w/ staff at times. Medication compliant. Good appetite. Up w/ walker and CGA. Reports feeling tired after dialysis so rested in her bed for awhile Out to dayroom for dinner showing motivation to be visible on the unit. Denies SI/HI/AVH, no RTIS noted. Will continue to monitor.

## 2022-03-04 NOTE — FLOWSHEET NOTE
Senior Purposeful Rounding    Position:Back    Physical Environment:Room free from clutter, Clear path to bathroom  and Adequate lighting    Pain Rating/ Nonverbal Pain Behaviors:0; asleep    Pain interventions Attempted: pt asleep    Patient Toileted:Continent

## 2022-03-04 NOTE — PLAN OF CARE
Went o bed early. Arousable w/ verbal stimuli. Limited conversation. Flat affect. Vague about why she is in the hospital.  When asked about her safety, states she feels safe now but people were definitely out to get her when she came to the ER.

## 2022-03-05 LAB
GLUCOSE BLD-MCNC: 105 MG/DL (ref 70–99)
GLUCOSE BLD-MCNC: 194 MG/DL (ref 70–99)
GLUCOSE BLD-MCNC: 221 MG/DL (ref 70–99)
GLUCOSE BLD-MCNC: 241 MG/DL (ref 70–99)
PERFORMED ON: ABNORMAL

## 2022-03-05 PROCEDURE — 1240000000 HC EMOTIONAL WELLNESS R&B

## 2022-03-05 PROCEDURE — 6370000000 HC RX 637 (ALT 250 FOR IP): Performed by: NURSE PRACTITIONER

## 2022-03-05 PROCEDURE — 94640 AIRWAY INHALATION TREATMENT: CPT

## 2022-03-05 PROCEDURE — 6370000000 HC RX 637 (ALT 250 FOR IP): Performed by: PSYCHIATRY & NEUROLOGY

## 2022-03-05 PROCEDURE — 94761 N-INVAS EAR/PLS OXIMETRY MLT: CPT

## 2022-03-05 PROCEDURE — 97530 THERAPEUTIC ACTIVITIES: CPT

## 2022-03-05 PROCEDURE — 97535 SELF CARE MNGMENT TRAINING: CPT

## 2022-03-05 RX ADMIN — INSULIN LISPRO 2 UNITS: 100 INJECTION, SOLUTION INTRAVENOUS; SUBCUTANEOUS at 12:09

## 2022-03-05 RX ADMIN — PREGABALIN 75 MG: 25 CAPSULE ORAL at 20:34

## 2022-03-05 RX ADMIN — ATORVASTATIN CALCIUM 40 MG: 40 TABLET, FILM COATED ORAL at 20:34

## 2022-03-05 RX ADMIN — ASPIRIN 81 MG: 81 TABLET, COATED ORAL at 08:33

## 2022-03-05 RX ADMIN — TIOTROPIUM BROMIDE AND OLODATEROL 2 PUFF: 3.124; 2.736 SPRAY, METERED RESPIRATORY (INHALATION) at 07:55

## 2022-03-05 RX ADMIN — INSULIN LISPRO 1 UNITS: 100 INJECTION, SOLUTION INTRAVENOUS; SUBCUTANEOUS at 20:34

## 2022-03-05 RX ADMIN — MIDODRINE HYDROCHLORIDE 10 MG: 5 TABLET ORAL at 08:33

## 2022-03-05 RX ADMIN — INSULIN GLARGINE 10 UNITS: 100 INJECTION, SOLUTION SUBCUTANEOUS at 08:33

## 2022-03-05 RX ADMIN — INSULIN LISPRO 2 UNITS: 100 INJECTION, SOLUTION INTRAVENOUS; SUBCUTANEOUS at 17:30

## 2022-03-05 NOTE — PLAN OF CARE
Problem: Skin Integrity:  Goal: Will show no infection signs and symptoms  Description: Will show no infection signs and symptoms  Outcome: Ongoing     Problem: Falls - Risk of:  Goal: Will remain free from falls  Description: Will remain free from falls  Outcome: Ongoing     Problem: Altered Mood, Psychotic Behavior:  Goal: Absence of self-harm  Description: Absence of self-harm  Outcome: Ongoing  Pt remains free from falls. No self harm. Pt had a shower, tolerated well. Social, out of her room. Great appetite.

## 2022-03-05 NOTE — BH NOTE
Purposeful Rounding    Patient concerns reported:none noted    Nurse made aware:no    Patient Turned and repositioned: Independent    Patient Toileted: Continent    Fall Precautions in Place: Yellow non-skid socks on, Bed alarm on and functioning properly, Bed locked in low position, Lighting appropriate, Room free of clutter and Clear path to bathroom      Electronically signed by Teresita Stoll on 3/5/22 at 5:40 PM EST

## 2022-03-05 NOTE — BH NOTE
Purposeful Rounding    Patient concerns reported:none noted    Nurse made aware:no    Patient Turned and repositioned: Independent    Patient Toileted: Continent    Fall Precautions in Place: Yellow non-skid socks on, Bed alarm on and functioning properly, Bed locked in low position, Lighting appropriate, Room free of clutter and Clear path to bathroom      Electronically signed by Shira Guido on 3/5/22 at 3:31 PM EST

## 2022-03-05 NOTE — PROGRESS NOTES
Department of Psychiatry  Progress Note    Patient's chart was reviewed. Discussed with treatment team. Met with patient. SUBJECTIVE:      Somnolent but much better today. Remembers \"beign crazy\" at the SNF. No-longer RTIS or delusional.     Further history - she was on sustained release xanax years ago. It was started again in January of this year. Was on other benzos off/on in between. She has been on 3mg daily up until her admission to the SNF. Has never experienced anything like this before though says she had an \"episode\" before her stroke year ago. ROS:   Patient has new complaints: yes -  Sleepy   Sleeping adequately:  Yes   Appetite adequate: Yes  Engaged in programming: some    OBJECTIVE:  VITALS:  BP (!) 173/75   Pulse 82   Temp 97.7 °F (36.5 °C) (Temporal)   Resp 16   Ht 5' 7\" (1.702 m)   Wt 165 lb 5.5 oz (75 kg)   LMP 02/06/2022 (Within Months)   SpO2 96%   BMI 25.90 kg/m²     Mental Status Examination:    Appearance:   in chair, better hygiene   Behavior/Attitude toward examiner: pleasant  Speech:  more communicative, non-pressured   Mood:  \"ok\"  Affect:  Flat    Thought processes:  goal direct able   Thought Content: no SI, no HI less delusional   Perceptions: no RTIS   Attention: improving  Abstraction:  improving  Cognition: improving  Insight:  improving  Judgment:  improving    Medication:  Scheduled:   haloperidol  2 mg Oral BID    aspirin  81 mg Oral Daily    atorvastatin  40 mg Oral Nightly    tiotropium-olodaterol  2 puff Inhalation Daily    insulin glargine  10 Units SubCUTAneous QAM    insulin lispro  0-6 Units SubCUTAneous TID WC    insulin lispro  0-3 Units SubCUTAneous Nightly    pregabalin  75 mg Oral Nightly        PRN:  acetaminophen, nicotine polacrilex, albuterol sulfate HFA, glucose, glucagon (rDNA), dextrose, dextrose bolus (hypoglycemia) **OR** dextrose bolus (hypoglycemia), midodrine, benztropine mesylate, heparin (porcine)     Formulation:  This is a domiciled and  51yo with a chart history of depression, anxiety, and OCD, and mutiple chronic medical conditions who was brought to the ED by mariaelena from [facility] with psychotic-like behavior.      Differential includes delirium, benzo withdrawal and psychosis. Most likely benzo withdrawal in my opinion.      Dx: Primary Psychiatric (DSM V) Diagnosis: psychosis   Secondary Psychiatric (DSM V) Diagnoses: depression, anxiety, and OCD  Chemical Dependency Diagnoses: benzos?     Plan:     1. Admit to inpatient senior psychiatry for further evaluation, stabilization, and treatment.      2. On admission, hold Luvox. Continue to hold benzos. Order Haldol 5mg BID, programming, structured milieu, and prn medication for anxiety, agitation, and insomnia. 3/3/2022 - reduce haldol to 2mg BID.      3. Internal medicine consult for admission. ESRD on HD  - nephrology consult  - HD Mon-Wed-Fri     DM type 2  - monitor glucose. - Cont Lantus 10 units daily, Low dose SSI coverage.      Neuropathy  - holding Lyrica  3/3/2022 - resumed     Leukocytosis  - repeat CBC     Hypertension  - BP hypotensive. Holding home regimen.   - Added Midodrine 10 mg TID as needed for SBP less than 90     Hyperlipidemia  - on Atorvastatin     H/o CVA  - can move all extremities; Right sided weakness  - on Aspirin, Atorvastatin.      COPD  - stable. No AE  - albuterol prn     Chronic Diastolic CHF  - stable. No s/s decompensation  - fluid management with HD     4. Collateral for diagnostic clarification and care coordination.      5. Admitted on a Statement of Believe but verbally agrees to stay voluntarily. Total time with patient was 35 minutes and more than 50 % of that time was spent counseling the patient on their symptoms, treatment, and expected goals.        Joana Wall MD

## 2022-03-05 NOTE — PROGRESS NOTES
Occupational Therapy  Occupational Therapy Daily Treatment Note    Unit: [facility]  Date:  3/5/2022  Patient Name:    Carlos A Oh  Admitting diagnosis:  Psychosis, unspecified psychosis type [facility]) Tom Pineda Date:  3/2/2022  Precautions/Restrictions:  Standard BHI Precautions  Fall Risk, impaired vision- pt reports she is blind, but can see shapes in well-lit areas        Discharge Recommendations:ARU  DME needs for discharge: defer to facility       Therapy recommendations for staff:   Assist of 1 (contact guard assist) with use of rolling walker (RW) for all transfers and ambulation within community room    AM-PAC Score: AM-PAC Inpatient Daily Activity Raw Score: 14  Home Health S4 Level: NA       Treatment Time:  6976-5509  Treatment number:  2   Total Treatment Time:   25 minutes    History of Present Illness: Per RIVERA Houston's 3/2/22 note, The patient is a 46 y.o. female with DM type 2, neuropathy, h/o CVA, hypertension, hyperlipidemia, COPD, ESRD on HD, CHF, insomnia, and depression who [facility] and aggressive behavior. Tsering Linder was seen and evaluated in the ED by the ED medical provider, patient was medically cleared for admission to [address] [place] [address].       Per chart review - pt was at [address] [place] [address] from ~1/07-1/20/22. Admitted there following a stay at [place] for frequent falls at home.        Subjective:  Pt was agreeable. Pt stated \"i'm not going back to rehab. I am going to go home. \"    Pain   No  Rating: NA  Location:   Pain Medicine Status: No request made      Bed Mobility:   Supine to Sit:  N/A  Sit to Supine:  SBA  Rolling:           N/A  Scooting:        N/A    Transfer Training:   Sit to stand:   SBA  Stand to sit:  SBA  Bed to Chair:  N/A  Bed to BSC:   N/A  Standard toilet:   SBA    Activity Tolerance   Pt completed therapy session with No adverse symptoms noted w/activity  SpO2:    HR:    BP:      Balance  Sitting Balance : Independent  Standing Balance   CGA    ADL Training:   Upper body dressing:  N/A  Upper body bathing:  N/A  Lower body dressing:  N/A  Lower body bathing:  N/A  Toileting:   CGA  Grooming/Hygiene:  N/A  Feeding :   N/A    Therapeutic Exercise:   N/A    Patient Education:   Role of OT  Recommendations for DC  Safe RW use/hand placement    Positioning Needs: In bed, call light and needs in reach. Family Present:  No    Assessment: Pt was seen this date. Pt performed functional mobility with more than five self correcting LOB. Pt had one LOB that required min A to correct. Pt was very polite and kind. GOALS  To be met in 3 Visits:  1). Pt. To complete interest check list.   2). Pt will participate in [facility] [place] [facility] assessment. 3). Pt will perform 2 grooming tasks with setup.     To be met in 5 Visits:  1). Supine to Sit IND  2). Bed to Chair/toilet with supervision  3). Upper Body Dressing with setup  4). Lower Body Dressing with Min A  5).  Pt to carla UE exs [id number]  6). Pt will verbalize 3 coping skills    Plan: cont with [address]/L [id number]      If patient discharges from this facility prior to next visit, this note will serve as the Discharge Summary

## 2022-03-05 NOTE — FLOWSHEET NOTE
Senior Purposeful Rounding    Position:Left Side, Back and Repositions Self resting with eyes closed    Physical Environment:Room free from clutter, Clear path to bathroom , Adequate lighting, Bed alarm functioning and No safety hazards noted    Pain Rating/ Nonverbal Pain Behaviors:0;       Patient Toileted:No- Void

## 2022-03-05 NOTE — FLOWSHEET NOTE
Senior Purposeful Rounding    Position:Back and Repositions Self resting in bed easily aroused    Physical Environment:Room free from clutter, Clear path to bathroom , Bed alarm functioning and No safety hazards noted    Pain Rating/ Nonverbal Pain Behaviors:denies      Patient Toileted:No- Void

## 2022-03-05 NOTE — FLOWSHEET NOTE
Senior Purposeful Rounding    Position:Back and Repositions Self    Physical Environment:Room free from clutter, Clear path to bathroom , Adequate lighting, Bed alarm functioning and No safety hazards noted    Pain Rating/ Nonverbal Pain Behaviors:0;       Patient Toileted:No- Void

## 2022-03-05 NOTE — FLOWSHEET NOTE
Senior Purposeful Rounding    Position:Left Side, Back and Repositions Self    Physical Environment:Room free from clutter, Clear path to bathroom , Adequate lighting, Bed alarm functioning and No safety hazards noted    Pain Rating/ Nonverbal Pain Behaviors:0;       Patient Toileted:No- Void

## 2022-03-05 NOTE — PROGRESS NOTES
Department of Psychiatry  Progress Note    Patient's chart was reviewed. Discussed with treatment team. Met with patient. SUBJECTIVE:      Continued to make and maintain gains. Much clearer. Delirium mostly resolved. No new complaints. More active today. Does not endorse active symptoms of OCD. Feels mood is stable. ROS:   Patient has new complaints: no  Sleeping adequately:  Yes   Appetite adequate: Yes  Engaged in programming: some    OBJECTIVE:  VITALS:  BP (!) 173/75   Pulse 82   Temp 97.7 °F (36.5 °C) (Temporal)   Resp 16   Ht 5' 7\" (1.702 m)   Wt 165 lb 5.5 oz (75 kg)   LMP 02/06/2022 (Within Months)   SpO2 96%   BMI 25.90 kg/m²     Mental Status Examination:    Appearance:   in chair, better hygiene   Behavior/Attitude toward examiner: pleasant  Speech:  more communicative, non-pressured   Mood:  \"btter\"  Affect:  Flat   Thought processes:  goal direct able   Thought Content: no SI, no HI, no delusions. Perceptions: no RTIS   Attention: improving  Abstraction:  improving  Cognition: improving  Insight:  improving  Judgment:  improving    Medication:  Scheduled:   haloperidol  2 mg Oral BID    aspirin  81 mg Oral Daily    atorvastatin  40 mg Oral Nightly    tiotropium-olodaterol  2 puff Inhalation Daily    insulin glargine  10 Units SubCUTAneous QAM    insulin lispro  0-6 Units SubCUTAneous TID WC    insulin lispro  0-3 Units SubCUTAneous Nightly    pregabalin  75 mg Oral Nightly        PRN:  acetaminophen, nicotine polacrilex, albuterol sulfate HFA, glucose, glucagon (rDNA), dextrose, dextrose bolus (hypoglycemia) **OR** dextrose bolus (hypoglycemia), midodrine, benztropine mesylate, heparin (porcine)     Formulation:  This is a domiciled and  51yo with a chart history of depression, anxiety, and OCD, and mutiple chronic medical conditions who was brought to the ED by mariaelena from [facility] with psychotic-like behavior.      Differential includes delirium, benzo withdrawal and psychosis. Most likely benzo withdrawal in my opinion.      Dx: Primary Psychiatric (DSM V) Diagnosis: psychosis   Secondary Psychiatric (DSM V) Diagnoses: depression, anxiety, and OCD  Chemical Dependency Diagnoses: benzos?     Plan:     1. Admit to inpatient senior psychiatry for further evaluation, stabilization, and treatment.      2. On admission, hold Luvox. Continue to hold benzos. Order Haldol 5mg BID, programming, structured milieu, and prn medication for anxiety, agitation, and insomnia. 3/3/2022 - reduce haldol to 2mg BID.   3/4/2022 - DC Haldol.     3. Internal medicine consult for admission. ESRD on HD  - nephrology consult  - HD Mon-Wed-Fri     DM type 2  - monitor glucose. - Cont Lantus 10 units daily, Low dose SSI coverage.      Neuropathy  - holding Lyrica  3/3/2022 - resumed     Leukocytosis  - repeat CBC     Hypertension  - BP hypotensive. Holding home regimen.   - Added Midodrine 10 mg TID as needed for SBP less than 90     Hyperlipidemia  - on Atorvastatin     H/o CVA  - can move all extremities; Right sided weakness  - on Aspirin, Atorvastatin.      COPD  - stable. No AE  - albuterol prn     Chronic Diastolic CHF  - stable. No s/s decompensation  - fluid management with HD     4. Collateral for diagnostic clarification and care coordination.      5. Admitted on a Statement of Believe but verbally agrees to stay voluntarily. Target DC Monday if continues to make gains. Total time with patient was 35 minutes and more than 50 % of that time was spent counseling the patient on their symptoms, treatment, and expected goals.      Yasir Hanson MD

## 2022-03-05 NOTE — FLOWSHEET NOTE
Senior Purposeful Rounding    Position:Back and Repositions Self laying in bed alert and oriented    Physical Environment:Room free from clutter, Adequate lighting and No safety hazards noted    Pain Rating/ Nonverbal Pain Behaviors:denies      Patient Toileted:No- Void

## 2022-03-05 NOTE — PLAN OF CARE
Problem: Altered Mood, Psychotic Behavior:  Goal: Able to verbalize decrease in frequency and intensity of hallucinations  Description: Able to verbalize decrease in frequency and intensity of hallucinations  3/4/2022 2340 by Ca See LPN  Outcome: Ongoing  3/4/2022 1553 by Micah Howard RN  Outcome: Ongoing     Problem: Altered Mood, Psychotic Behavior:  Goal: Absence of self-harm  Description: Absence of self-harm  Outcome: Ongoing   Patient is resting in her room laying in bed in the beginning of the shift, alert and oriented X4. Pleasant, friendly,  and cooperative during 1:1. Patient denies any hallucinations and does not appear to be responding to internal stimuli this evening. Denies SI an HI as well. Currently is resting in bed with her eye closed, will continue to monitor through out shift.

## 2022-03-05 NOTE — FLOWSHEET NOTE
Senior Purposeful Rounding    Position:Back and Repositions Self    Physical Environment:Room free from clutter, Clear path to bathroom , Adequate lighting, Bed alarm functioning and No safety hazards noted    Pain Rating/ Nonverbal Pain Behaviors:0      Patient Toileted:No- Void

## 2022-03-06 LAB
GLUCOSE BLD-MCNC: 110 MG/DL (ref 70–99)
GLUCOSE BLD-MCNC: 227 MG/DL (ref 70–99)
GLUCOSE BLD-MCNC: 233 MG/DL (ref 70–99)
GLUCOSE BLD-MCNC: 259 MG/DL (ref 70–99)
PERFORMED ON: ABNORMAL

## 2022-03-06 PROCEDURE — 99231 SBSQ HOSP IP/OBS SF/LOW 25: CPT | Performed by: PSYCHIATRY & NEUROLOGY

## 2022-03-06 PROCEDURE — 94640 AIRWAY INHALATION TREATMENT: CPT

## 2022-03-06 PROCEDURE — 97112 NEUROMUSCULAR REEDUCATION: CPT

## 2022-03-06 PROCEDURE — 97530 THERAPEUTIC ACTIVITIES: CPT

## 2022-03-06 PROCEDURE — 94761 N-INVAS EAR/PLS OXIMETRY MLT: CPT

## 2022-03-06 PROCEDURE — 1240000000 HC EMOTIONAL WELLNESS R&B

## 2022-03-06 PROCEDURE — 97116 GAIT TRAINING THERAPY: CPT

## 2022-03-06 PROCEDURE — 6370000000 HC RX 637 (ALT 250 FOR IP): Performed by: PSYCHIATRY & NEUROLOGY

## 2022-03-06 PROCEDURE — 6370000000 HC RX 637 (ALT 250 FOR IP): Performed by: NURSE PRACTITIONER

## 2022-03-06 RX ADMIN — ATORVASTATIN CALCIUM 40 MG: 40 TABLET, FILM COATED ORAL at 20:49

## 2022-03-06 RX ADMIN — INSULIN GLARGINE 10 UNITS: 100 INJECTION, SOLUTION SUBCUTANEOUS at 09:14

## 2022-03-06 RX ADMIN — INSULIN LISPRO 1 UNITS: 100 INJECTION, SOLUTION INTRAVENOUS; SUBCUTANEOUS at 20:52

## 2022-03-06 RX ADMIN — INSULIN LISPRO 3 UNITS: 100 INJECTION, SOLUTION INTRAVENOUS; SUBCUTANEOUS at 17:55

## 2022-03-06 RX ADMIN — INSULIN LISPRO 2 UNITS: 100 INJECTION, SOLUTION INTRAVENOUS; SUBCUTANEOUS at 11:51

## 2022-03-06 RX ADMIN — ASPIRIN 81 MG: 81 TABLET, COATED ORAL at 09:16

## 2022-03-06 RX ADMIN — PREGABALIN 75 MG: 25 CAPSULE ORAL at 20:50

## 2022-03-06 RX ADMIN — TIOTROPIUM BROMIDE AND OLODATEROL 2 PUFF: 3.124; 2.736 SPRAY, METERED RESPIRATORY (INHALATION) at 08:19

## 2022-03-06 ASSESSMENT — PAIN SCALES - WONG BAKER: WONGBAKER_NUMERICALRESPONSE: 0

## 2022-03-06 ASSESSMENT — PAIN SCALES - GENERAL: PAINLEVEL_OUTOF10: 0

## 2022-03-06 NOTE — PROGRESS NOTES
Inpatient Geriatric  Behavior Health Physical Therapy Daily Treatment Note    Unit:  [facility]  Date:  3/6/2022  Patient Name:    Zee Escalante  Admitting diagnosis:  Psychosis, unspecified psychosis type [facility]) James Cruz Date:  3/2/2022  Precautions/Restrictions:    Standard BHI Precautions  Fall Risk  Legally Blind  History of current Episode: Per Luke STAFFORD H&P: \"The patient is a 47 y.o. female with DM type 2, neuropathy, h/o CVA, hypertension, hyperlipidemia, COPD, ESRD on HD, CHF, insomnia, and depression who presented to [facility] complaint of hallucinations and aggressive behavior. \"  Per pt - first CVA about 2 years ago, with R sided deficits, almost full recovery of RUE/RLE but left legally blind; second CVA in Feb of this year, for which she had gone to [facility] before being admitted to hospital. Pt reports she has not transferred or ambulated with walker yet in rehab at [facility]. Per chart review - pt was at [address] [place] [address] from ~1/07-1/20/22. Admitted there following a stay at [facility] for frequent falls at home. Met 4/5 short term PT goals; no long term goals documented. Discharge Recommendations from PHYSICAL perspective:                                          SNF    DME needs for discharge:     Needs Met     Therapy recommendations for staff:   Assist of 1 (contact guard assist) with use of rolling walker (RW) and gait belt for all transfers and ambulation   to/from chair  to/from bathroom  within room  within community room  Therapy recommendation for EMS Transport: can transport by wheelchair    [facility] S4 Level Recommendation: Level 1- Standard   AM-PAC Mobility Score     AM-PAC Inpatient Mobility Raw Score : 18   AM-PAC Inpatient Mobility without Stair Climbing Raw Score : 17    Treatment Time:  13:40-14:20  Treatment number: 2    Subjective:    Pt. Agreeable to tx   Patient stated that she is not planning to go back to rehab,but wants to go home.  Patient also states that she does not wish to have home PT/OT. Patient reports that her  is available to provide 24/7     Cognition  A&O to Person, Place, Time and Situation  Able to follow:  2 step commands    Pain   No    Bed Mobility   Supine to Sit: Supervision, HOB flat, no handrail  Sit to Supine:  Supervision  Rolling:  Not Tested  Scooting:  Independent    Transfer Training   Sit to stand:  CGA  Stand to sit:  CGA  Bed to Chair:  CGA with use of gait belt and rolling walker (RW), plus VC to assist with navigating obstacles due to visual deficiets    Gait Training gait completed as indicated below  Deviations (firm surface/linoleum): decreased noble and narrow ROMAINE, step through pattern, no LOB   Level of Assist:    CGA  Assistive Device Used:    gait belt and rolling walker (RW)  Distance: 125,125 ft  Cued on: navigating obstacles due to visual deficeits    Therapeutic Exercises   Exercises in BOLD performed in unit today. Items not bolded are carried forward from prior visits for continuity of the record. Exercise/Equipment Resistance/Repetitions Other comments     Strengthening exercises                                                 Neuromuscular re-ed - balance exercises     Unsupported standing with unilateral UE reaching/placing 1 minute x 3    Sit to stand with 1 hand  X3, no LOB                    Balance  Static Sitting:  Good   Dynamic Sitting:  Good   Static Standing: Good -   Dynamic Standing: Good -   See above neuromuscular re-ed exercises    Patient Education       Educated on importance of continued activity   Educated on safety with transfers  Educated on proper gait pattern and RW distance  Educated on role of PT, POC as well as importance of continued activity   Discussed the need for ongoing PT-patient states she does not want home PT      Positioning Needs       Patient in chair watching TV. Requested a snack (ham sandwich).  RN contacted and provided this to this writer to give to patient     Response to Treatment and Activity Tolerance  Pt tolerated treatment well  No adverse symptoms noted w/activity      Assessment   Patient demonstrated improved functional mobility this date. Patient would benefit from continued skilled PT while in this facility. Recommending SNF to maximize functional mobility prior to return to home setting. Goals :   Patient Goals for Therapy: Trial walking with walker. Agreeable to further skilled therapy upon d/c, open to consider SNF or ARU. To be met in 3 visits:  1). Demonstrate improved safety awareness with functional mobility requiring less overall cueing.      To be met in 6 visits:  1). Supine to/from sit  Independent to promote return to functional ADLs  2). Sit to/from stand   Modified Independent to promote return to functional ADLs  3). Gait: Ambulate 150 ft. with Modified Independent and use of rolling walker (RW) demonstrating improved gait pattern  4). Tolerate B LE exercises 3 sets of 10-15 reps to improve strength   5). Tolerated standing balance exercises with improved balance to Good -  grade    Plan   Continue with plan of care. Time Coded Treatment Minutes:   40 minutes    Total Treatment Time:   40 minutes  Cris Irizarry, PT #[id number]    If patient discharges from this facility prior to next visit, this note will serve as the Discharge Summary.

## 2022-03-06 NOTE — FLOWSHEET NOTE
Senior Purposeful Rounding    Position:Sitting    Physical Environment:Room free from clutter, Adequate lighting and No safety hazards noted    Pain Rating/ Nonverbal Pain Behaviors:0      Patient Toileted:No- Void

## 2022-03-06 NOTE — FLOWSHEET NOTE
Senior Purposeful Rounding    Position:Back and Repositions Self resdting in bed with eyes closed    Physical Environment:Room free from clutter, Clear path to bathroom , Adequate lighting, Bed alarm functioning and No safety hazards noted    Pain Rating/ Nonverbal Pain Behaviors:0;       Patient Toileted:No- Void

## 2022-03-06 NOTE — PLAN OF CARE
Problem: Falls - Risk of:  Goal: Will remain free from falls  Description: Will remain free from falls  3/5/2022 2323 by Mehul Lee LPN  Outcome: Ongoing  3/5/2022 1621 by Erin Culp LPN  Outcome: Ongoing     Problem: Altered Mood, Psychotic Behavior:  Goal: Able to verbalize decrease in frequency and intensity of hallucinations  Description: Able to verbalize decrease in frequency and intensity of hallucinations  Outcome: Ongoing   Patient was sitting in the day room at the beginning of the shift facing the TV. Pleasant and cooperative upon approach and answers questions appropriately. Denies having any hallucinations and denies SI or HI. When asked if she remembers when she was admitted and the delusions that she was having patient denied this as well. Patient continues on fall precautions and utilizes a front wheeled walker to ambulate as well as stand by assistance. No falls reported of noted. Currently patient is resting in bed with eyes closed. Will continue to monitor through the shift.

## 2022-03-06 NOTE — FLOWSHEET NOTE
Senior Purposeful Rounding    Position:Left Side, Back and Repositions Self resting with eyes closed    Physical Environment:Room free from clutter, Clear path to bathroom , Bed alarm functioning and No safety hazards noted    Pain Rating/ Nonverbal Pain Behaviors:0;     Patient Toileted:No- Void

## 2022-03-06 NOTE — FLOWSHEET NOTE
Senior Purposeful Rounding    Position:Left Side, Back and Repositions Self resting in bed with eyes closed    Physical Environment:Room free from clutter, Clear path to bathroom , Adequate lighting and No safety hazards noted    Pain Rating/ Nonverbal Pain Behaviors:0      Patient Toileted:No- Void

## 2022-03-06 NOTE — BH NOTE
Purposeful Rounding    Patient concerns reported:none noted    Nurse made aware:no    Patient Turned and repositioned: Independent    Patient Toileted: Continent    Fall Precautions in Place: Yellow non-skid socks on, Lighting appropriate, Room free of clutter and Clear path to bathroom      Electronically signed by Freddy Carr on 3/6/22 at 9:18 AM EST

## 2022-03-06 NOTE — PROGRESS NOTES
Department of Psychiatry  AttendingProgress Note    Chief Complaint: \"feeling better\"    Heike Hua is feeling better, no longer confused or agitated. She is working with OT/PT and is doing well on her medications. No delusions, no hallucinations. She misses her cat, Aphrodite and is looking forward to discharge    The treatment team met and discussed the treatment plan. SUBJECTIVE:        Suicidal ideation:  denies suicidal ideation. Patient does not have medication side effects. ROS: Patient has new complaints: no  Sleeping adequately:  Yes   Appetite adequate: Yes  Attending groups: Yes      OBJECTIVE    Physical  Vitals:    Blood pressure (!) 96/58, pulse 82, temperature 97.6 °F (36.4 °C), temperature source Oral, resp. rate 16, height 5' 7\" (1.702 m), weight 165 lb 5.5 oz (75 kg), last menstrual period 02/06/2022, SpO2 97 %.   General appearance:  []  appears age, [x]  appears older than stated age,     [x]  adequately dressed and groomed, [] disheveled,                [x]  in no acute distress, [] appears mildly distressed,      []  Other:      MUSCULOSKELETAL:   Gait:   [] normal, [] antalgic, [] unsteady, [x] in chair, gait not evaluated   Station:  [] erect, [x] sitting, [] recumbent, [] other        Strength/tone:  [] muscle strength and tone appear consistent with age and condition     [] atrophy      [] abnormal movements  PSYCHIATRIC:    Relatedness:  [x] cooperative, [] guarded, [] indifferent, [] hostile,      [] sedated  Speech:  [x] normal prosody, [] pressured, [] decreased volume,    [] slurred, [] halting, [] slowed, [] loud     [] echolalia, [] incoherent, [] stuttering   Eye contact:  [x] direct, [] avoidant, [] intense  Kinetics:  [x] normal, [] increased, [] decreased  Mood:   [x] euthymic, [] depressed, [] anxious, [] irritable,     [] labile  Affect:   [x] normal range, [] constricted, [] depressed, [] anxious,     [] angry, [] blunted, [] flat  Hallucinations  [x] denies, [] auditory, [] visual,  [] olfactory, [] tactile  Delusions  [x] none, [] grandiose,  [] jealous,  [] persecutory,  [] somatic,     [] bizarre  Preoccupations  [] none, [] violence, [] obsessions, [] other     Suicidal ideation  [] denies, [] endorses  Homicidal ideation [x] denies, [] endorses  Thought process: [x] logical, [] circumstantial, [] tangential     [] concrete, [] disorganized  Associations:  [x] logical and coherent, [] loosening, [] disorganized  Insight:   [] good, [x] fair, [] poor  Judgment:  [] good, [x] fair, [] poor  Attention and concentration:     [x] intact, [] limited, [] able to focus on interview,     [] grossly impaired  Orientation:  [x] person, place, time, situation     [] disoriented to:     Memory:  Remote memory [x] intact, [] impaired     Recent memory  [x] intact, [] impaired          Data  Labs:   Admission on 03/02/2022   Component Date Value Ref Range Status    POC Glucose 03/02/2022 155* 70 - 99 mg/dl Final    Performed on 03/02/2022 ACCU-CHEK   Final    POC Glucose 03/02/2022 211* 70 - 99 mg/dl Final    Performed on 03/02/2022 ACCU-CHEK   Final    POC Glucose 03/02/2022 92  70 - 99 mg/dl Final    Performed on 03/02/2022 ACCU-CHEK   Final    POC Glucose 03/03/2022 224* 70 - 99 mg/dl Final    Performed on 03/03/2022 ACCU-CHEK   Final    POC Glucose 03/03/2022 136* 70 - 99 mg/dl Final    Performed on 03/03/2022 ACCU-CHEK   Final    POC Glucose 03/03/2022 240* 70 - 99 mg/dl Final    Performed on 03/03/2022 ACCU-CHEK   Final    WBC 03/04/2022 11.6* 4.0 - 11.0 K/uL Final    RBC 03/04/2022 4.39  4.00 - 5.20 M/uL Final    Hemoglobin 03/04/2022 11.5* 12.0 - 16.0 g/dL Final    Hematocrit 03/04/2022 36.6  36.0 - 48.0 % Final    MCV 03/04/2022 83.5  80.0 - 100.0 fL Final    MCH 03/04/2022 26.1  26.0 - 34.0 pg Final    MCHC 03/04/2022 31.3  31.0 - 36.0 g/dL Final    RDW 03/04/2022 19.7* 12.4 - 15.4 % Final    Platelets 25/45/2518 316  135 - 450 K/uL Final    MPV 03/04/2022 7.8 5.0 - 10.5 fL Final    Sodium 03/04/2022 137  136 - 145 mmol/L Final    Potassium 03/04/2022 4.4  3.5 - 5.1 mmol/L Final    Chloride 03/04/2022 99  99 - 110 mmol/L Final    CO2 03/04/2022 23  21 - 32 mmol/L Final    Anion Gap 03/04/2022 15  3 - 16 Final    Glucose 03/04/2022 97  70 - 99 mg/dL Final    BUN 03/04/2022 41* 7 - 20 mg/dL Final    CREATININE 03/04/2022 6.6* 0.6 - 1.1 mg/dL Final    GFR Non- 03/04/2022 7* >60 Final    Comment: >60 mL/min/1.73m2 EGFR, calc. for ages 25 and older using the  MDRD formula (not corrected for weight), is valid for stable  renal function.  GFR  03/04/2022 8* >60 Final    Comment: Chronic Kidney Disease: less than 60 ml/min/1.73 sq.m. Kidney Failure: less than 15 ml/min/1.73 sq.m. Results valid for patients 18 years and older.       Calcium 03/04/2022 9.5  8.3 - 10.6 mg/dL Final    Phosphorus 03/04/2022 5.9* 2.5 - 4.9 mg/dL Final    Albumin 03/04/2022 3.7  3.4 - 5.0 g/dL Final    POC Glucose 03/04/2022 86  70 - 99 mg/dl Final    Performed on 03/04/2022 ACCU-CHEK   Final    POC Glucose 03/04/2022 197* 70 - 99 mg/dl Final    Performed on 03/04/2022 ACCU-CHEK   Final    POC Glucose 03/04/2022 262* 70 - 99 mg/dl Final    Performed on 03/04/2022 ACCU-CHEK   Final    POC Glucose 03/05/2022 105* 70 - 99 mg/dl Final    Performed on 03/05/2022 ACCU-CHEK   Final    POC Glucose 03/05/2022 241* 70 - 99 mg/dl Final    Performed on 03/05/2022 ACCU-CHEK   Final    POC Glucose 03/05/2022 221* 70 - 99 mg/dl Final    Performed on 03/05/2022 ACCU-CHEK   Final    POC Glucose 03/05/2022 194* 70 - 99 mg/dl Final    Performed on 03/05/2022 ACCU-CHEK   Final    POC Glucose 03/06/2022 110* 70 - 99 mg/dl Final    Performed on 03/06/2022 ACCU-CHEK   Final    POC Glucose 03/06/2022 233* 70 - 99 mg/dl Final    Performed on 03/06/2022 ACCU-CHEK   Final            Medications  Current Facility-Administered Medications: acetaminophen (TYLENOL) tablet 650 mg, 650 mg, Oral, Q4H PRN  nicotine polacrilex (COMMIT) lozenge 2 mg, 2 mg, Oral, Q1H PRN  albuterol sulfate  (90 Base) MCG/ACT inhaler 2 puff, 2 puff, Inhalation, Q6H PRN  aspirin EC tablet 81 mg, 81 mg, Oral, Daily  atorvastatin (LIPITOR) tablet 40 mg, 40 mg, Oral, Nightly  tiotropium-olodaterol (STIOLTO) 2.5-2.5 MCG/ACT inhaler 2 puff, 2 puff, Inhalation, Daily  insulin glargine (LANTUS) injection vial 10 Units, 10 Units, SubCUTAneous, QAM  glucose (GLUTOSE) 40 % oral gel 15 g, 15 g, Oral, PRN  glucagon (rDNA) injection 1 mg, 1 mg, IntraMUSCular, PRN  dextrose 5 % solution, 100 mL/hr, IntraVENous, PRN  insulin lispro (HUMALOG) injection vial 0-6 Units, 0-6 Units, SubCUTAneous, TID WC  insulin lispro (HUMALOG) injection vial 0-3 Units, 0-3 Units, SubCUTAneous, Nightly  dextrose bolus (hypoglycemia) 10% 125 mL, 125 mL, IntraVENous, PRN **OR** dextrose bolus (hypoglycemia) 10% 250 mL, 250 mL, IntraVENous, PRN  midodrine (PROAMATINE) tablet 10 mg, 10 mg, Oral, TID PRN  pregabalin (LYRICA) capsule 75 mg, 75 mg, Oral, Nightly  benztropine mesylate (COGENTIN) injection 1 mg, 1 mg, IntraMUSCular, BID PRN  heparin (porcine) injection 3,600 Units, 3,600 Units, IntraCATHeter, PRN    ASSESSMENT AND PLAN    Principal Problem:    Psychosis (HCC)  Active Problems:    H/O: CVA (cerebrovascular accident)    End-stage renal disease on hemodialysis (HCC)    Hypotension  Resolved Problems:    * No resolved hospital problems. *     Mental status is now stable. She is looking forward to discharge. 1.Patient's symptoms are improving  2. Probable discharge is next week  3. Discharge planning is incomplete    P:  No medication changes      Total time with patient was 40 minutes and more than 50 % of that time was spent counseling the patient on their symptoms, treatment and expected goals.

## 2022-03-06 NOTE — FLOWSHEET NOTE
Senior Purposeful Rounding    Position:Back and Repositions Self    Physical Environment:Room free from clutter, Clear path to bathroom , Bed alarm functioning and No safety hazards noted    Pain Rating/ Nonverbal Pain Behaviors:0      Patient Toileted:No- Void

## 2022-03-06 NOTE — PLAN OF CARE
Problem: Skin Integrity:  Goal: Will show no infection signs and symptoms  Description: Will show no infection signs and symptoms  Outcome: Ongoing     Problem: Falls - Risk of:  Goal: Will remain free from falls  Description: Will remain free from falls  Outcome: Ongoing     Problem: Altered Mood, Psychotic Behavior:  Goal: Able to verbalize reality based thinking  Description: Able to verbalize reality based thinking  Outcome: Ongoing  Goal: Compliance with prescribed medication regimen will improve  Description: Compliance with prescribed medication regimen will improve  Outcome: Ongoing     Pt rested in room for the majority of the first half of shift. Later on in the shift she came out and interacted with other patients. Pt is tolerating meds without complication. She denies SI/HI/AVH and not RTIS. Ate less than half of all three meals.  Tolerating this unit without complications

## 2022-03-06 NOTE — FLOWSHEET NOTE
Senior Purposeful Rounding    Position:Left Side and Repositions Self    Physical Environment:Room free from clutter, Clear path to bathroom , Bed alarm functioning and No safety hazards noted    Pain Rating/ Nonverbal Pain Behaviors:0      Patient Toileted:  No-void

## 2022-03-07 VITALS
WEIGHT: 168.65 LBS | HEART RATE: 87 BPM | HEIGHT: 67 IN | RESPIRATION RATE: 16 BRPM | DIASTOLIC BLOOD PRESSURE: 67 MMHG | TEMPERATURE: 98.4 F | SYSTOLIC BLOOD PRESSURE: 127 MMHG | OXYGEN SATURATION: 97 % | BODY MASS INDEX: 26.47 KG/M2

## 2022-03-07 DIAGNOSIS — E11.49 OTHER DIABETIC NEUROLOGICAL COMPLICATION ASSOCIATED WITH TYPE 2 DIABETES MELLITUS (HCC): ICD-10-CM

## 2022-03-07 DIAGNOSIS — E78.2 MIXED HYPERLIPIDEMIA: ICD-10-CM

## 2022-03-07 DIAGNOSIS — E11.69 TYPE 2 DIABETES MELLITUS WITH OTHER SPECIFIED COMPLICATION, WITH LONG-TERM CURRENT USE OF INSULIN (HCC): ICD-10-CM

## 2022-03-07 DIAGNOSIS — Z79.4 TYPE 2 DIABETES MELLITUS WITH OTHER SPECIFIED COMPLICATION, WITH LONG-TERM CURRENT USE OF INSULIN (HCC): ICD-10-CM

## 2022-03-07 PROBLEM — F13.931 BENZODIAZEPINE WITHDRAWAL WITH DELIRIUM (HCC): Status: ACTIVE | Noted: 2022-03-07

## 2022-03-07 PROBLEM — F29 PSYCHOSIS (HCC): Status: RESOLVED | Noted: 2022-03-01 | Resolved: 2022-03-07

## 2022-03-07 LAB
ALBUMIN SERPL-MCNC: 3.6 G/DL (ref 3.4–5)
ANION GAP SERPL CALCULATED.3IONS-SCNC: 18 MMOL/L (ref 3–16)
BASOPHILS ABSOLUTE: 0 K/UL (ref 0–0.2)
BASOPHILS RELATIVE PERCENT: 0.3 %
BUN BLDV-MCNC: 70 MG/DL (ref 7–20)
CALCIUM SERPL-MCNC: 8.8 MG/DL (ref 8.3–10.6)
CHLORIDE BLD-SCNC: 97 MMOL/L (ref 99–110)
CO2: 19 MMOL/L (ref 21–32)
CREAT SERPL-MCNC: 9 MG/DL (ref 0.6–1.1)
EOSINOPHILS ABSOLUTE: 0 K/UL (ref 0–0.6)
EOSINOPHILS RELATIVE PERCENT: 0.2 %
GFR AFRICAN AMERICAN: 6
GFR NON-AFRICAN AMERICAN: 5
GLUCOSE BLD-MCNC: 111 MG/DL (ref 70–99)
GLUCOSE BLD-MCNC: 177 MG/DL (ref 70–99)
GLUCOSE BLD-MCNC: 188 MG/DL (ref 70–99)
GLUCOSE BLD-MCNC: 217 MG/DL (ref 70–99)
HCT VFR BLD CALC: 31.6 % (ref 36–48)
HCT VFR BLD CALC: 34.6 % (ref 36–48)
HEMOGLOBIN: 10.1 G/DL (ref 12–16)
HEMOGLOBIN: 11 G/DL (ref 12–16)
LACTIC ACID: 1.5 MMOL/L (ref 0.4–2)
LYMPHOCYTES ABSOLUTE: 1.9 K/UL (ref 1–5.1)
LYMPHOCYTES RELATIVE PERCENT: 10.7 %
MCH RBC QN AUTO: 26.2 PG (ref 26–34)
MCH RBC QN AUTO: 26.4 PG (ref 26–34)
MCHC RBC AUTO-ENTMCNC: 31.8 G/DL (ref 31–36)
MCHC RBC AUTO-ENTMCNC: 31.9 G/DL (ref 31–36)
MCV RBC AUTO: 82.2 FL (ref 80–100)
MCV RBC AUTO: 82.8 FL (ref 80–100)
MONOCYTES ABSOLUTE: 0.8 K/UL (ref 0–1.3)
MONOCYTES RELATIVE PERCENT: 4.8 %
NEUTROPHILS ABSOLUTE: 14.7 K/UL (ref 1.7–7.7)
NEUTROPHILS RELATIVE PERCENT: 84 %
PDW BLD-RTO: 19.7 % (ref 12.4–15.4)
PDW BLD-RTO: 20.4 % (ref 12.4–15.4)
PERFORMED ON: ABNORMAL
PHOSPHORUS: 6.4 MG/DL (ref 2.5–4.9)
PLATELET # BLD: 280 K/UL (ref 135–450)
PLATELET # BLD: 286 K/UL (ref 135–450)
PMV BLD AUTO: 7.9 FL (ref 5–10.5)
PMV BLD AUTO: 8.1 FL (ref 5–10.5)
POTASSIUM SERPL-SCNC: 4.9 MMOL/L (ref 3.5–5.1)
PROCALCITONIN: 0.29 NG/ML (ref 0–0.15)
RBC # BLD: 3.84 M/UL (ref 4–5.2)
RBC # BLD: 4.18 M/UL (ref 4–5.2)
SODIUM BLD-SCNC: 134 MMOL/L (ref 136–145)
WBC # BLD: 16.7 K/UL (ref 4–11)
WBC # BLD: 17.6 K/UL (ref 4–11)

## 2022-03-07 PROCEDURE — 85025 COMPLETE CBC W/AUTO DIFF WBC: CPT

## 2022-03-07 PROCEDURE — 87040 BLOOD CULTURE FOR BACTERIA: CPT

## 2022-03-07 PROCEDURE — 6370000000 HC RX 637 (ALT 250 FOR IP): Performed by: NURSE PRACTITIONER

## 2022-03-07 PROCEDURE — 36415 COLL VENOUS BLD VENIPUNCTURE: CPT

## 2022-03-07 PROCEDURE — 83605 ASSAY OF LACTIC ACID: CPT

## 2022-03-07 PROCEDURE — 90935 HEMODIALYSIS ONE EVALUATION: CPT

## 2022-03-07 PROCEDURE — 84145 PROCALCITONIN (PCT): CPT

## 2022-03-07 PROCEDURE — 97116 GAIT TRAINING THERAPY: CPT

## 2022-03-07 PROCEDURE — 85027 COMPLETE CBC AUTOMATED: CPT

## 2022-03-07 PROCEDURE — 94761 N-INVAS EAR/PLS OXIMETRY MLT: CPT

## 2022-03-07 PROCEDURE — 99232 SBSQ HOSP IP/OBS MODERATE 35: CPT | Performed by: NURSE PRACTITIONER

## 2022-03-07 PROCEDURE — 80069 RENAL FUNCTION PANEL: CPT

## 2022-03-07 PROCEDURE — 6360000002 HC RX W HCPCS: Performed by: INTERNAL MEDICINE

## 2022-03-07 PROCEDURE — 5130000000 HC BRIDGE APPOINTMENT

## 2022-03-07 RX ORDER — HEPARIN SODIUM 1000 [USP'U]/ML
3600 INJECTION, SOLUTION INTRAVENOUS; SUBCUTANEOUS PRN
Status: DISCONTINUED | OUTPATIENT
Start: 2022-03-07 | End: 2022-03-07 | Stop reason: HOSPADM

## 2022-03-07 RX ADMIN — HEPARIN SODIUM 3600 UNITS: 1000 INJECTION INTRAVENOUS; SUBCUTANEOUS at 11:00

## 2022-03-07 RX ADMIN — EPOETIN ALFA-EPBX 2000 UNITS: 3000 INJECTION, SOLUTION INTRAVENOUS; SUBCUTANEOUS at 10:59

## 2022-03-07 RX ADMIN — INSULIN LISPRO 2 UNITS: 100 INJECTION, SOLUTION INTRAVENOUS; SUBCUTANEOUS at 16:40

## 2022-03-07 RX ADMIN — INSULIN GLARGINE 10 UNITS: 100 INJECTION, SOLUTION SUBCUTANEOUS at 12:02

## 2022-03-07 RX ADMIN — EPOETIN ALFA-EPBX 3000 UNITS: 3000 INJECTION, SOLUTION INTRAVENOUS; SUBCUTANEOUS at 10:56

## 2022-03-07 RX ADMIN — MIDODRINE HYDROCHLORIDE 10 MG: 5 TABLET ORAL at 08:22

## 2022-03-07 NOTE — FLOWSHEET NOTE
Treatment time: 3.5 hours  Net UF: 1522 ml    Pre weight: 78 kg   Post weight: 76.5 kg  EDW: 75 kg    Access used: R Chest Tunneled Catheter  Access function: Good with  ml/min    Medications or blood products given: Midodrine 10mg, Retacrit 5000u    Regular outpatient schedule: MWF    Summary of response to treatment: Post tx- Pt is alert, VSS, lines flushed, sealed and capped per policy, no complaints at this time, Tolerated treatment and fluid removal fair, weight goal adjusted per MD order and tolerance with weight removal per crit line and BP, medicated x 1 with Midodrine as ordered.  Report called to primary care nurse, pt stable upon d/c from [address]: Initial Hct: 30.6;   End Profile : B; Refill ( Hct.1 -35.2  subtract Hct 2- 35.2): No Refill ; BV: -13.1 %      Copy of dialysis treatment record placed in chart, to be scanned into EMR.       03/07/22 0730 03/07/22 1125   Vital Signs   /61 127/67   Temp 96.2 °F (35.7 °C) 98.4 °F (36.9 °C)   Pulse 72 87   Resp 16 16   Weight 171 lb 15.3 oz (78 kg) 168 lb 10.4 oz (76.5 kg)   Weight Method Standing scale Standing scale   Percent Weight Change 4 2   Dry Weight 165 lb 5.5 oz (75 kg) 165 lb 5.5 oz (75 kg)

## 2022-03-07 NOTE — BH NOTE
Pt's outpatient dialysis clinic, [facility], contacted regarding pt's discharge and informed about pt having blood cultures drawn due to leukocytosis. The RN at the dialysis clinic was informed that they should follow up regarding the blood culture results.

## 2022-03-07 NOTE — PROGRESS NOTES
[facility]  Nephrology Follow up Note           Reason for Consult: ESRD  Requesting Physician:  Dr. Gentry Curling history  Seen and examined at HD on 3/7, with 2 to 3 L UF goal    HD on 3/4/22 w 1l UF pre wt 75.8 kg, post weight 75 kg    ROS: No chest pain/shortness of breath/fever/nausea/vomiting  PSFH: No visitor    Scheduled Meds:   epoetin yohana-epbx  2,000 Units IntraVENous Once per day on Mon Wed Fri    And    epoetin yohana-epbx  3,000 Units IntraVENous Once per day on Mon Wed Fri    aspirin  81 mg Oral Daily    atorvastatin  40 mg Oral Nightly    tiotropium-olodaterol  2 puff Inhalation Daily    insulin glargine  10 Units SubCUTAneous QAM    insulin lispro  0-6 Units SubCUTAneous TID WC    insulin lispro  0-3 Units SubCUTAneous Nightly    pregabalin  75 mg Oral Nightly     Continuous Infusions:   dextrose       PRN Meds:.heparin (porcine), acetaminophen, nicotine polacrilex, albuterol sulfate HFA, glucose, glucagon (rDNA), dextrose, dextrose bolus (hypoglycemia) **OR** dextrose bolus (hypoglycemia), midodrine, benztropine mesylate    History of Present Illness on 3/2/2022:    55 y.o. yo female with PMH of diabetes, hypertension, Legal blindness, diabetic neuropathy, hyperlipidemia, CVA, ESRD on HD Monday Wednesday Friday at [facility] who is admitted [facility] psych unit for psychosis.   Nephrology has been consulted for dialysis needs    Physical exam:   Constitutional:  VITALS:  BP (!) 140/74   Pulse 94   Temp 99.2 °F (37.3 °C) (Oral)   Resp 16   Ht 5' 7\" (1.702 m)   Wt 165 lb 5.5 oz (75 kg)   LMP 02/06/2022 (Within Months)   SpO2 97%   BMI 25.90 kg/m²   Gen: alert, awake  Neck: No JVD  Skin: Unremarkable  Cardiovascular:  S1, S2 without m/r/g   Respiratory: CTA B without w/r/r; respiratory effort normal  Abdomen:  soft, nt, nd,   Extremities: no lower extremity edema  Neuro/Psy: AAoriented times 3 ; moves all 4 ext  Dialysis access right upper extremity AV fistula  Created on 2/10/22 ( Dr Felice Brown); [facility]     Data/  Recent Labs     03/07/22  0619   WBC 16.7*   HGB 10.1*   HCT 31.6*   MCV 82.2        Recent Labs     03/07/22  0619   *   K 4.9   CL 97*   CO2 19*   GLUCOSE 188*   PHOS 6.4*   BUN 70*   CREATININE 9.0*   LABGLOM 5*   GFRAA 6*       Assessment  -ESRD on HD Monday Wednesday Friday at [facility] followed by by Dr. Fernando Flow group     -Psychosis for psychiatry    -Hypertension   Currently hypotensive   -Anemia, above goal    -History of hyperkalemia    -CKD/MBD    Plan  -HD MWF per schedule   Will ascertain new target weight  -EPO 5000 units with dialysis from 3/7  -hold lisinopril  -avoid BP and blood draws from RUE  -renal diet  -Midodrine for hypotension for SBP<90 n prn during HD   -Renal dose medications    Thank you for the consultation. Please do not hesitate to call with questions. Radha Randolph MD  Office: [phone]  Fax:    [phone] [address] [facility]

## 2022-03-07 NOTE — BH NOTE
Senior Purposeful Rounding    Position:Back    Physical Environment:Room free from clutter, Clear path to bathroom , Adequate lighting, Bed alarm functioning and No safety hazards noted    Pain Rating/ Nonverbal Pain Behaviors:0    Pain interventions Attempted: None    Patient Toileted:Continent and Independent with minimal assistance.

## 2022-03-07 NOTE — BH NOTE
Senior Purposeful Rounding    Position:Back    Physical Environment:Room free from clutter, Clear path to bathroom , Adequate lighting, Bed alarm functioning and No safety hazards noted    Pain Rating/ Nonverbal Pain Behaviors:denies;    Pain interventions Attempted: None    Patient Toileted:Continent and No- Void

## 2022-03-07 NOTE — PROGRESS NOTES
Inpatient Geriatric  Behavior Health Physical Therapy Daily Treatment Note    Unit:  [facility]  Date:  3/7/2022  Patient Name:    Faisal Johnson  Admitting diagnosis:  Psychosis, unspecified psychosis type [facility]) Alexa Coronado Date:  3/2/2022  Precautions/Restrictions:    Standard BHI Precautions  Fall Risk  Legally Blind     History of current Episode: Per Liz Adjutant RIVERA H&P: \"The patient is a 47 y.o. female with DM type 2, neuropathy, h/o CVA, hypertension, hyperlipidemia, COPD, ESRD on HD, CHF, insomnia, and depression who presented to [facility] complaint of hallucinations and aggressive behavior. \"  Per pt - first CVA about 2 years ago, with R sided deficits, almost full recovery of RUE/RLE but left legally blind; second CVA in Feb of this year, for which she had gone to [facility] before being admitted to hospital. Pt reports she has not transferred or ambulated with walker yet in rehab at [facility]. Per chart review - pt was at [address] [place] [address] from ~1/07-1/20/22. Admitted there following a stay at [facility] for frequent falls at home. Met 4/5 short term PT goals; no long term goals documented. Discharge Recommendations from PHYSICAL perspective:                                          Home 24 hr assist and outpatient PT  - spouse can provide    DME needs for discharge:     Needs Met       Therapy recommendations for staff:   Supervision with use of rolling walker (RW) for all transfers and ambulation   to/from chair  to/from bathroom  within room  within community room  Therapy recommendation for EMS Transport: can transport by wheelchair    [facility] S4 Level Recommendation: Level 1- Standard   AM-PAC Mobility Score     AM-PAC Inpatient Mobility Raw Score : 22   AM-PAC Inpatient Mobility without Stair Climbing Raw Score : 17    Treatment Time:  13:55 - 14:12  Treatment number: 3    Subjective:    Pt. Agreeable to tx. Hoping to D/C home today.      Cognition  A&O to Person, Place, Time and Situation  Able to follow:  2 step commands    Pain   No    Bed Mobility   Supine to Sit:  Not Tested  Sit to Supine:  Not Tested  Rolling:  Not Tested  Scooting:  Independent    Transfer Training   Sit to stand:  Supervision  Stand to sit:  Supervision  Bed to Chair: Supervision with use of gait belt and rolling walker (RW)    Gait Training gait completed as indicated below  Deviations (firm surface/linoleum): decreased noble and narrow ROMAINE, step through pattern, no LOB   Level of Assist:    Supervision  Assistive Device Used:    gait belt and rolling walker (RW)  Distance: 250 ft    Therapeutic Exercises   Exercises in BOLD performed in unit today. Items not bolded are carried forward from prior visits for continuity of the record. Exercise/Equipment Resistance/Repetitions Other comments     Strengthening exercises                                                 Neuromuscular re-ed - balance exercises                               Balance  Static Sitting:  Good   Dynamic Sitting:  Good   Static Standing: Good -   Dynamic Standing: Good -     Patient Education       Educated on importance of continued activity   Educated on safety with transfers  Educated on proper gait pattern and RW distance  Educated on role of PT, POC as well as importance of continued activity   Discussed the need for ongoing PT-patient states she does not want home PT but is agreeable to Outpatient PT     Positioning Needs       Pt up in chair, in community room, positioned in proper neutral alignment and pressure relief provided. Needs within reach. Response to Treatment and Activity Tolerance  Pt tolerated treatment well  No adverse symptoms noted w/activity    Assessment   Patient demonstrated safe mobility at the supervision level today with no safety concerns. Pt will have 24hr care at home from spouse. Recommending home with 24hr assist and Outpatient PT upon D/C.     Goals :   Patient Goals for Therapy: Trial walking with walker. Agreeable to further skilled therapy upon d/c, open to consider SNF or ARU. To be met in 3 visits:  1). Demonstrate improved safety awareness with functional mobility requiring less overall cueing.      To be met in 6 visits:  1). Supine to/from sit  Independent to promote return to functional ADLs  2). Sit to/from stand   Modified Independent to promote return to functional ADLs  3). Gait: Ambulate 150 ft. with Modified Independent and use of rolling walker (RW) demonstrating improved gait pattern  4). Tolerate B LE exercises 3 sets of 10-15 reps to improve strength   5). Tolerated standing balance exercises with improved balance to Good -  grade    Plan   Continue with plan of care. Time Coded Treatment Minutes:   17 minutes    Total Treatment Time:   17 minutes    Juvenal Leiva PT, DPT, OMT-C  #[id number]      If patient discharges from this facility prior to next visit, this note will serve as the Discharge Summary.

## 2022-03-07 NOTE — GROUP NOTE
Group Therapy Note    Date: 3/7/2022    Group Start Time: 1300  Group End Time: 3034  Group Topic: Recreational    [id number] [address]        Group Therapy Note    Attendees: 5    Group members used Happy Inspector and were asked to make their \"happy place\" and describe it. Notes:  Kristine attended group for the full duration. Damarislie Dials sat at the table and engaged in conversation with other members of the group. Status After Intervention:  Improved    Participation Level:  Active Listener and Interactive    Participation Quality: Appropriate and Attentive      Speech:  normal      Thought Process/Content: Logical      Affective Functioning: Congruent      Mood: euthymic      Level of consciousness:  Alert      Response to Learning: Able to verbalize current knowledge/experience      Endings: None Reported    Modes of Intervention: Exploration and Activity      Discipline Responsible: Behavorial Health Tech      Signature:  LEYDI Choudhury

## 2022-03-07 NOTE — PLAN OF CARE
Problem: Skin Integrity:  Goal: Absence of new skin breakdown  Description: Absence of new skin breakdown  Outcome: Ongoing     Problem: Falls - Risk of:  Goal: Will remain free from falls  Description: Will remain free from falls  3/7/2022 0327 by Milad Calvert RN  Outcome: Ongoing     Problem: Altered Mood, Psychotic Behavior:  Goal: Able to demonstrate trust by eating, participating in treatment and following staff's direction  Description: Able to demonstrate trust by eating, participating in treatment and following staff's direction  Outcome: Ongoing     Problem: Altered Mood, Psychotic Behavior:  Goal: Able to verbalize decrease in frequency and intensity of hallucinations  Description: Able to verbalize decrease in frequency and intensity of hallucinations  Outcome: Ongoing     Problem: Altered Mood, Psychotic Behavior:  Goal: Absence of self-harm  Description: Absence of self-harm  Outcome: Ongoing   Pt alert and oriented. Calm and pleasant. Able to make needs known. Medication compliant. Denies AH/VH, SI/HI. Isolates to room. Remains free of self harm, denies pain, free of falls.

## 2022-03-07 NOTE — PROGRESS NOTES
[facility]  Discharge Note    Pt discharged with followings belongings:   Dental Appliances: None  Vision - Corrective Lenses: None  Hearing Aid: None  Jewelry: None  Clothing: Footwear,Sweater,Other (Comment) (shoes and sweatpants in locker)  Were All Patient Medications Collected?: Not Applicable  Other Valuables: Cell phone   Valuables to patient. Patient education on aftercare instructions: explained verbally and accepted  Information faxed to  psych by [facility] at 5:56 PM .Patient verbalize understanding of AVS:  yes.     Status EXAM upon discharge:  Status and Exam  Normal: No  Facial Expression: Flat,Sad  Affect: Congruent  Level of Consciousness: Alert  Mood:Normal: No  Mood: Empty,Sad  Motor Activity:Normal: No  Motor Activity: Increased  Interview Behavior: Cooperative  Preception: [place] to Person,[place] to Place,[place] to Time  Attention:Normal: No  Attention: Distractible  Thought Processes: Circumstantial  Thought Content:Normal: No  Thought Content: Preoccupations  Hallucinations: None  Delusions: No  Delusions: Persecution  Memory:Normal: No  Memory: Poor Recent  Insight and Judgment: No  Insight and Judgment: Poor Judgment,Poor Insight  Present Suicidal Ideation: No  Present Homicidal Ideation: No      Metabolic Screening:    Lab Results   Component Value Date    LABA1C 6.7 01/05/2022       Lab Results   Component Value Date    CHOL 164 02/24/2021    CHOL 219 (H) 08/28/2020    CHOL 184 11/09/2011    CHOL 174 04/18/2011    CHOL 166 10/06/2010     Lab Results   Component Value Date    TRIG 319 (H) 08/31/2021    TRIG 157 (H) 02/24/2021    TRIG 223 (H) 08/28/2020    TRIG 352 (H) 11/09/2011    TRIG 471 (H) 04/18/2011    TRIG 221 (H) 10/06/2010     Lab Results   Component Value Date    HDL 41 02/24/2021    HDL 37 (L) 08/28/2020    HDL 34 (L) 11/09/2011    HDL 30 (L) 04/18/2011    HDL 32 (L) 10/06/2010     No components found for: LDLCAL  Lab Results   Component Value Date    LABVLDL 31 02/24/2021    LABVLDL 45 08/28/2020    LABVLDL 70 11/09/2011    LABVLDL 44 10/06/2010     Bridge Appointment completed: Reviewed Discharge Instructions with patient. Patient verbalizes understanding and agreement with the discharge plan using the teachback method.      Referral for Outpatient Tobacco Cessation Counseling, upon discharge (teressa X if applicable and completed):    ( )  Hospital staff assisted patient to call Quit Line or faxed referral                                   during hospitalization                  ( )  Recognizing danger situations (included triggers and roadblocks), if not completed on admission                    ( )  Coping skills (new ways to manage stress, exercise, relaxation techniques, changing routine, distraction), if not completed on admission                                                           ( )  Basic information about quitting (benefits of quitting, techniques in how to quit, available resources, if not completed on admission  ( ) Referral for counseling faxed to [facility]   ( ) Patient refused referral  ( x) Patient refused counseling  ( ) Patient refused smoking cessation medication upon discharge    Vaccinations (teressa X if applicable and completed):  ( ) Patient states already received influenza vaccine elsewhere  ( ) Patient received influenza vaccine during this hospitalization  (x) Patient refused influenza vaccine at this time    Topher Colunga RN

## 2022-03-07 NOTE — PROGRESS NOTES
Progress Note    Admit Date:  3/2/2022    Subjective:  Ms. Shavonne Camp seen sitting up in chair. WBC noted to be 16.7. Blood cultures ordered. 99.2 temp this am.  Pt does have RIJ cath in place for dialysis as fistula recently placed. Pt denies any shortness of breath, cough, cold, congestion, fever, or chills. She denies any urinary symptoms. Objective:   Patient Vitals for the past 4 hrs:   BP Temp Pulse Resp Weight   03/07/22 1125 127/67 98.4 °F (36.9 °C) 87 16 168 lb 10.4 oz (76.5 kg)         Intake/Output Summary (Last 24 hours) at 3/7/2022 1418  Last data filed at 3/6/2022 1730  Gross per 24 hour   Intake 240 ml   Output --   Net 240 ml       Physical Exam:    Gen: No distress. Alert. Eyes: PERRL. No sclera icterus. No conjunctival injection. ENT: No discharge. Pharynx clear. Neck: Trachea midline. Resp: No accessory muscle use. No crackles. No wheezes. No rhonchi. CV: Regular rate. Regular rhythm. No murmur.  No rub. No edema. Right IJ tunneled dialysis catheter  GI: Non-tender. Non-distended. Normal bowel sounds. Skin: Warm and dry. No nodule on exposed extremities. No rash on exposed extremities. Right AV Fistula + bruit/thrill  M/S: No cyanosis. No joint deformity. No clubbing. Neuro: Awake. able to ambulate. Moves all extremities; follows commands.  Right sided weakness from previous CVA  Psych: Per psychiatry team evaluation     Data:  CBC:   Recent Labs     03/07/22 0619   WBC 16.7*   HGB 10.1*   HCT 31.6*   MCV 82.2        BMP:   Recent Labs     03/07/22 0619   *   K 4.9   CL 97*   CO2 19*   PHOS 6.4*   BUN 70*   CREATININE 9.0*       CULTURES  COVID: not detected       Assessment/Plan:  Leukocytosis   - WBC 11.6----16.7  - add procal, discussed with nephrology and if high plan to start anabiotics   - Blood cultures ordered  - urine culture ordered  - no shortness of breath, cough, cold or congestion  - would monitor tonight and repeat CBC in am and await blood culture results  - recheck CBC in am and await blood culture results     ESRD on HD  - nephrology consult  - HD Mon-Wed-Fri     DM type 2  - monitor glucose. - Cont Lantus 10 units daily, Low dose SSI coverage.      Neuropathy  - holding Lyrica     Hypertension  - BP hypotensive--improved  - Holding home regimen.   - Added Midodrine 10 mg TID as needed for SBP less than 90     Hyperlipidemia  - on Atorvastatin     H/o CVA  - can move all extremities; Right sided weakness  - on Aspirin, Atorvastatin.      COPD  - stable. No AE  - albuterol prn     Chronic Diastolic CHF  - stable. No s/s decompensation  - fluid management with HD     Psychosis  - management per psychiatry     Diet: ADULT DIET; Regular; 4 carb choices (60 gm/meal); Low Potassium (Less than 3000 mg/day); Low Phosphorus (Less than 1000 mg); 60 to 80 gm  Code Status: Full Code      Addendum: prcal added 0.29, discussed with Dr. Marley Kidd, ok for patient to be discharged. Pt wants to go home today, refusing to go back to SNF. She will go home with . Pt to monitor for any s/sx of infection ie- fever, chills, AMS. Dialysis clinic needs to be updated regarding blood cultures and leukocytosis. Discussed with Dr. Bebe Wilson.      RIVERA Gutiérrez - CNP   3/7/2022

## 2022-03-07 NOTE — BH NOTE
Met with pt to discuss dc planning. She does not want to return to the SNF and does not feel like she needs home or out-pt PT/OT despite the OT. PT recommendations. She stated she feels like she is mobile enough to care for herself at home. She also stated she has discussed her plan to return home with her , and he agrees with it.  would not be able to pick her up at dc.

## 2022-03-07 NOTE — BH NOTE
Senior Purposeful Rounding    Position:Back    Physical Environment:Room free from clutter, Clear path to bathroom , Adequate lighting, Bed alarm functioning and No safety hazards noted    Pain Rating/ Nonverbal Pain Behaviors:denies    Pain interventions Attempted: None    Patient Toileted:Continent with stand by assistance for transfer to .

## 2022-03-07 NOTE — BH NOTE
Senior Purposeful Rounding    Position:Sitting    Physical Environment:Room free from clutter, Clear path to bathroom , Adequate lighting, Bed alarm functioning and No safety hazards noted    Pain Rating/ Nonverbal Pain Behaviors:denies    Pain interventions Attempted: None    Patient Toileted:Continent with stand by assist transfer.

## 2022-03-07 NOTE — BH NOTE
Senior Purposeful Rounding    Position:Back and Repositions Self    Physical Environment:Room free from clutter, Clear path to bathroom , Adequate lighting, Bed alarm functioning and No safety hazards noted    Pain Rating/ Nonverbal Pain Behaviors:0    Pain interventions Attempted: None    Patient Toileted:Continent and No- Void

## 2022-03-07 NOTE — PROGRESS NOTES
Notified Dr. Carla Polk of panic level Creatinine 9.0. Dr. Carla Polk stated do not order additional labs at this time. Dialysis aware of results.

## 2022-03-07 NOTE — BH NOTE
Senior Purposeful Rounding    Position:Back    Physical Environment:Room free from clutter, Clear path to bathroom , Adequate lighting, Bed alarm functioning and No safety hazards noted    Pain Rating/ Nonverbal Pain Behaviors:0, rests with calm expression. Respirations regular and even. Pain interventions Attempted: None    Patient Toileted:Continent with assistance to transfer to BR as needed.

## 2022-03-08 RX ORDER — PREGABALIN 75 MG/1
CAPSULE ORAL
Qty: 30 CAPSULE | OUTPATIENT
Start: 2022-03-08

## 2022-03-08 RX ORDER — DULAGLUTIDE 1.5 MG/.5ML
INJECTION, SOLUTION SUBCUTANEOUS
Qty: 2 ML | OUTPATIENT
Start: 2022-03-08

## 2022-03-08 RX ORDER — ATORVASTATIN CALCIUM 40 MG/1
TABLET, FILM COATED ORAL
Qty: 30 TABLET | Refills: 3 | OUTPATIENT
Start: 2022-03-08

## 2022-03-11 LAB
BLOOD CULTURE, ROUTINE: NORMAL
CULTURE, BLOOD 2: NORMAL

## 2022-03-13 DIAGNOSIS — J44.9 CHRONIC OBSTRUCTIVE PULMONARY DISEASE, UNSPECIFIED COPD TYPE (HCC): ICD-10-CM

## 2022-03-14 RX ORDER — GLYCOPYRROLATE AND FORMOTEROL FUMARATE 9; 4.8 UG/1; UG/1
AEROSOL, METERED RESPIRATORY (INHALATION)
Qty: 10.7 G | Refills: 2 | OUTPATIENT
Start: 2022-03-14

## 2022-03-15 NOTE — PROGRESS NOTES
Daly Biggs   55 y.o. female   1975    HPI:    Patient was last seen by me on 12/15/2021. Reason(s) for visit:   Chief Complaint   Patient presents with    Discuss Medications    Follow-Up from [address] [place] [address]       Since our Stafford Jadee, patient was seen at [facility] ER on 3/1/2022 for complaints of hallucinations and aggressive behavior. This was likely due to benzodiazepine withdrawal.  She was transferred to [facility] in [facility]. She was there from 3/2/2022-3/7/2022. Of note, she was in [place] because of hypotension issues. Patient stated that she was evaluated by physiatrist (?). She stated that she was advised to lower the dose of her meds because it can increased the risk of ataxia, fatigue, malaise. Per chart review, she had elevated WBC (16.7). Blood and urine cultures were ordered on 3/7/2022. No urine specimen was produced. Blood cultures x2 showed no growth after 4 days. Patient gave me her med list: Albuterol, Aspirin, Atorvastatin, Bupropion, Truliicty, Fluvoxamine, Formoterol. Patient stated that she has had increased anxiety since being discharged from the behavioral health unit. She stated that her combination of fluvoxamine and alprazolam (specifically 2 mg extended release daily) has worked very well for her. She also vocalized that fluoxetine has worked well for treating her anxiety/depression. Bupropion 150 mg daily was added on last office visit. She stated that she did not notice much improvement; however, she fortunately had no side effects.     HTN:  BP Readings from Last 3 Encounters:   03/17/22 130/78   03/07/22 127/67   03/02/22 (!) 151/75     -BP at home: 130/70's.  -Patient denied issues with frequent headache, chest pain, shortness of breath, palpitations, malaise, etc.      Type II DM:  Lab Results   Component Value Date    LABA1C 6.7 01/05/2022     Lab Results   Component Value Date    .6 01/05/2022       Patient stated that she has struggled with migraines all her life. She stated they are typically occur in the left davion-orbital area. He reported of twisting and stabbing area. Severe migraines can last 3-4 days at a time. Endorsed photophobia and phonophobia. Denied redness of eye, chemosis, diaphoresis, nasal congestion, rhinorrhea, etc.  She reported of increased floaters. She has tried Sumatriptan, Rizatriptan. She cannot do NSAIDs due to her ESRD. She's limited with taking Tylenol due to liver and renal issues. ESRD:  -Patient on dialysis and managed by nephrologist.     Allergies   Allergen Reactions    Amoxicillin Hives, Itching and Other (See Comments)     Tolerates cephalosporins  Patient tolerating cefazolin (ANCEF) as of October 11, 2018      Levofloxacin Anaphylaxis    Vancomycin Anaphylaxis, Hives and Shortness Of Breath    Tape [Adhesive Tape] Other (See Comments)     Paper tape turns skin bright red. Plastic tape okay. Current Outpatient Medications on File Prior to Visit   Medication Sig Dispense Refill    albuterol sulfate  (90 Base) MCG/ACT inhaler Inhale 2 puffs into the lungs every 6 hours as needed for Wheezing or Shortness of Breath 18 g 1    aspirin 81 MG EC tablet Take 1 tablet by mouth daily 30 tablet 3    sevelamer (RENVELA) 2.4 g PACK packet  (Patient not taking: Reported on 3/17/2022)      LORazepam (ATIVAN) 0.5 MG tablet  (Patient not taking: Reported on 3/17/2022)      Glucagon, rDNA, (GLUCAGON EMERGENCY) 1 MG KIT Inject 1 Units as directed as needed (if blood sugar is less than 60 and you are symptomatic) (Patient not taking: Reported on 3/17/2022) 1 kit 1    lisinopril (PRINIVIL;ZESTRIL) 10 MG tablet Take 1 tablet by mouth daily (Patient not taking: Reported on 3/17/2022) 30 tablet 3    Heat Wraps (THERMACARE BACK/HIP) MISC 1 each by Does not apply route daily as needed (back pain) (Patient not taking: Reported on 3/17/2022) 3 each 5    Misc.  Devices (PULSE OXIMETER) MISC 1 each by Does not apply route every 6-8 hours as needed (shortness of breath) (Patient not taking: Reported on 3/17/2022) 1 each 0    Nasal Dilators (BREATHE RIGHT EXTRA STRENGTH) STRP 1 strip by Does not apply route nightly as needed (nasal congestion) (Patient not taking: Reported on 3/17/2022) 8 strip 2    glucose (GLUTOSE) 40 % GEL Take 37.5 mLs by mouth as needed (low blood sugar) (Patient not taking: Reported on 3/17/2022) 45 g 1     No current facility-administered medications on file prior to visit.         Family History   Problem Relation Age of Onset    Diabetes Mother    [address] Other Mother 79        Covid    Diabetes Father     High Blood Pressure Father     Colon Cancer Father     Diabetes Sister     Alcohol Abuse Maternal Grandfather     Diabetes Paternal Grandmother     Alcohol Abuse Paternal Grandfather     Diabetes Paternal Aunt     Diabetes Paternal Uncle        Social History     Tobacco Use    Smoking status: Former Smoker     Packs/day: 1.00     Types: Cigarettes    Smokeless tobacco: Never Used    Tobacco comment: Quit in August 2021   Substance Use Topics    Alcohol use: No     Comment: Very Rare        Lab Results   Component Value Date    WBC 17.6 (H) 03/07/2022    HGB 11.0 (L) 03/07/2022    HCT 34.6 (L) 03/07/2022    MCV 82.8 03/07/2022     03/07/2022         Chemistry        Component Value Date/Time     (L) 03/07/2022 0619    K 4.9 03/07/2022 0619    K 4.9 02/20/2022 1328    CL 97 (L) 03/07/2022 0619    CO2 19 (L) 03/07/2022 0619    BUN 70 (H) 03/07/2022 0619    CREATININE 9.0 (HH) 03/07/2022 0619        Component Value Date/Time    CALCIUM 8.8 03/07/2022 0619    ALKPHOS 164 (H) 03/01/2022 0326    AST 21 03/01/2022 0326    ALT <5 (L) 03/01/2022 0326    BILITOT 0.4 03/01/2022 0326          Lab Results   Component Value Date    ALT <5 (L) 03/01/2022    AST 21 03/01/2022    ALKPHOS 164 (H) 03/01/2022    BILITOT 0.4 03/01/2022       Review of Systems Constitutional: Positive for activity change. Negative for appetite change, fatigue, fever and unexpected weight change. HENT: Negative for congestion, rhinorrhea, sinus pressure and trouble swallowing. Respiratory: Negative for cough, chest tightness, shortness of breath and wheezing. Cardiovascular: Negative for chest pain, palpitations and leg swelling. Gastrointestinal: Negative for abdominal distention, abdominal pain, blood in stool, constipation, diarrhea, nausea and vomiting. Genitourinary: Negative for dysuria, frequency and hematuria. Musculoskeletal: Negative for arthralgias and back pain. Skin: Negative for rash. Neurological: Negative for dizziness, weakness, light-headedness, numbness and headaches. Psychiatric/Behavioral: Positive for dysphoric mood and sleep disturbance. The patient is nervous/anxious. Wt Readings from Last 3 Encounters:   03/17/22 168 lb 12.8 oz (76.6 kg)   03/07/22 168 lb 10.4 oz (76.5 kg)   03/01/22 146 lb (66.2 kg)       BP Readings from Last 3 Encounters:   03/17/22 130/78   03/07/22 127/67   03/02/22 (!) 151/75       Pulse Readings from Last 3 Encounters:   03/17/22 98   03/07/22 87   03/02/22 71       /78   Pulse 98   Temp 97.2 °F (36.2 °C)   Wt 168 lb 12.8 oz (76.6 kg)   LMP 11/17/2021   SpO2 98%   BMI 26.44 kg/m²      Physical Exam  Vitals reviewed. Constitutional:       General: She is awake. She is not in acute distress. Appearance: She is not ill-appearing or diaphoretic. HENT:      Head: Normocephalic and atraumatic. No abrasion or masses. Hair is normal.      Right Ear: External ear normal.      Left Ear: External ear normal.      Nose: Nose normal.   Eyes:      General: Lids are normal. Gaze aligned appropriately. No scleral icterus. Right eye: No discharge. Left eye: No discharge. Extraocular Movements: Extraocular movements intact.       Conjunctiva/sclera: Conjunctivae normal.   Neck:      Trachea: Phonation normal.   Cardiovascular:      Rate and Rhythm: Regular rhythm. Tachycardia present. Pulmonary:      Effort: Pulmonary effort is normal. No respiratory distress. Breath sounds: No wheezing, rhonchi or rales. Abdominal:      General: Abdomen is flat. There is no distension. Palpations: Abdomen is soft. Musculoskeletal:         General: No deformity. Normal range of motion. Cervical back: Normal range of motion. No erythema. Right lower leg: No edema. Left lower leg: No edema. Skin:     Coloration: Skin is not cyanotic, jaundiced or pale. Findings: No abrasion, abscess, bruising, ecchymosis, erythema, signs of injury, laceration, lesion, petechiae, rash or wound. Neurological:      General: No focal deficit present. Mental Status: She is alert. Mental status is at baseline. GCS: GCS eye subscore is 4. GCS verbal subscore is 5. GCS motor subscore is 6. Cranial Nerves: No cranial nerve deficit, dysarthria or facial asymmetry. Motor: No weakness, tremor, atrophy or seizure activity. Coordination: Coordination normal.      Gait: Gait is intact. Psychiatric:         Attention and Perception: Attention and perception normal.         Mood and Affect: Mood and affect normal.         Speech: Speech normal.         Behavior: Behavior normal. Behavior is cooperative. Thought Content: Thought content normal.       Assessment/Plan:   Perla Alonzo was seen today for discuss medications, follow-up from hospital and medication refill. Diagnoses and all orders for this visit:    Hospital discharge follow-up  -     MD DISCHARGE MEDS RECONCILED W/ CURRENT OUTPATIENT MED LIST    Generalized anxiety disorder with panic attacks  Comments:  Discussed about concern about developing serotonin syndrome as well as polypharmacy.   Will adjust her meds to avoid issues of drowsiness, dizziness, weakness, malaise, etc. she stated that particularly with alprazolam 2 mg extended release as worked very well with helping control her issues with anxiety/stress    I discussed with patient the difference between acute vs preventative/maintenance medications regarding treatment of anxiety/depression/bipolar disorder. Drugs of the SSRI/SNRI class as well as anti-psychotics can have side effects such as weight gain, sexual dysfunction, insomnia, headache, abdominal discomfort, nausea, vomiting, etc. These medications are generally effective at alleviating symptoms of anxiety and/or depression, but side effects tend to occur within the first 1-2 weeks of taking the medication. Patient was informed to let me know if any significant side effects do occur. Patient was instructed to take the medication(s) every day as directed and that this medication is not an as needed medication because the medication may take at least 2 weeks to see a difference if not at least 4-6 weeks to have a beneficial effect and that by going even 1-2 days without taking the medication one could risk of having rebound anxiety/depression. In addition, the patient was informed that this medication cannot be abruptly stopped after having had taken it for a long period of time and that the medication would have to be gradually tapered off under the guidance of a healthcare provider. The patient was informed that 4-6 weeks follow-up is generally recommended follow-up time for starting/adding a new medication or with adjusting the dose of any given medication. I have discussed with patient (at some point) and made aware that some patients may benefit from more than one medication compared to monotherapy. A combination of both medical and behavioral therapy may yield better/more effective results than either therapy alone.   Lastly, it's worth mentioning that the medication(s) prescribed will not completely resolve feelings of anxiety/depression as well as make one immune or completely prevent from having more issues with anxiety and/or depression. Stress (emotional & physical) is part of everyday life. Orders:  -     buPROPion (WELLBUTRIN XL) 150 MG extended release tablet; Take 1 tablet by mouth every morning  -     fluvoxaMINE (LUVOX) 50 MG tablet; Take 1 tablet by mouth nightly  -     ALPRAZolam (XANAX XR) 2 MG extended release tablet; Take 1 tablet by mouth every morning for 30 days. Obsessive-compulsive disorder, unspecified type  -     fluvoxaMINE (LUVOX) 50 MG tablet; Take 1 tablet by mouth nightly    Chronic migraine with aura  -     propranolol (INDERAL LA) 80 MG extended release capsule; Take 1 capsule by mouth daily  -     Fremanezumab-vfrm (AJOVY) 225 MG/1.5ML SOSY; Inject 225 mg into the skin every 30 days  -     Patient's migraine does not resemble a classical cluster headache; however, it may be worth switching patient from propranolol to verapamil.  -     Given she has issues of hypotension, will try and avoid increasing Propranolol.  -     Will avoid topiramate b/c she's already on GLP-1 agonist, which can cause appetite suppression. At risk for polypharmacy  Comments:  Discussed that her anxiety meds, louisa benzodiazepines, Lyrica, and ESRD can increase her risk of dizziness, malaise, and make her more prone to falling. Type 2 diabetes mellitus with diabetic polyneuropathy, with long-term current use of insulin ([facility])  Comments: Will reintroduce Trulicity and start Lantus back at 10 units along with rapid acting insulin. Orders:  -     Dulaglutide 1.5 MG/0.5ML SOPN; Inject 1.5 mg into the skin once a week FRIDAYS  Over 399 6 Units  -     Insulin Pen Needle 29G X 12.7MM MISC; 1 each by Does not apply route daily  -     pregabalin (LYRICA) 75 MG capsule; Take 1 capsule by mouth daily for 30 days. -     insulin glargine (LANTUS;BASAGLAR) 100 UNIT/ML injection pen;  Inject 10 Units into the skin nightly  -     insulin lispro, 1 Unit Dial, 100 UNIT/ML SOPN; Inject 0-6 Units into the skin 3 times daily (with meals) Per sliding scale    End-stage renal disease on hemodialysis [facility])  Comments: Follow up with nephrologist accordingly. Chronic obstructive pulmonary disease, unspecified COPD type ([address]  Comments:  Given extensive smoking hx, likely has some level of COPD. Will start on maintenance inhaler. Orders:  -     glycopyrrolate-formoterol (Ferd Amanda) 9-4.8 MCG/ACT AERO; Inhale 2 puffs into the lungs 2 times daily    Mixed hyperlipidemia  -     atorvastatin (LIPITOR) 40 MG tablet; Take 1 tablet by mouth nightly      I reviewed the plan of care with the patient. Patient acknowledged understanding and agreed with plan of care overall. Medications Discontinued During This Encounter   Medication Reason    insulin glargine (LANTUS SOLOSTAR) 100 UNIT/ML injection pen LIST CLEANUP    insulin lispro, 1 Unit Dial, 100 UNIT/ML SOPN REORDER    atorvastatin (LIPITOR) 40 MG tablet REORDER    Insulin Pen Needle 29G X 12.7MM MISC REORDER    glycopyrrolate-formoterol (BEVESPI AEROSPHERE) 9-4.8 MCG/ACT AERO REORDER    fluvoxaMINE (LUVOX) 100 MG tablet DOSE ADJUSTMENT    Dulaglutide 1.5 MG/0.5ML SOPN REORDER    buPROPion (WELLBUTRIN XL) 150 MG extended release tablet REORDER    pregabalin (LYRICA) 75 MG capsule REORDER    insulin lispro, 1 Unit Dial, 100 UNIT/ML SOPN REORDER        General information on medications:  -When it comes to medications, whether with starting or adding a new medication or increasing the dose of a current medication, the benefits and risks have to always be considered and weighed over, especially if one is taking other medications as well.    -There are no medications that have no side effects and that there is always a risk involved with taking a medication.    -If a side effect were to occur with starting a new medication or with increasing the dose of a current medication that either the medication can be totally discontinued altogether or simply decrease the dose of it and if this would be the case a follow-up appointment would be deemed necessary.    -The drug allergy list will then be updated with the corresponding side effect(s) if it's deemed to be a true 'drug allergy'. -The most common adverse effects of medication(s) were addressed at today's visit.    -Lastly, the coverage status of a medication may vary from insurance to insurance and the only way to verify if the medication is covered is to send an actual prescription in.    -The drug formulary of each insurance changes without any warning or notification to the healthcare provider let alone the pharmacy.  -The cost of medications vary from insurance to insurance and the cost is always subject to change just like the drug formulary. Follow-up: Return in about 8 weeks (around 5/12/2022). .     Patient was informed that if his or her symptoms worsen to follow up with me sooner or go to the nearest ER if the symptoms are very significant and warrant higher level of care. Regarding my note:  -This note was composed (by me only and not with assistance via a scribe) to the best of my knowledge and recollection of the encounter with the patient using one of my own customized note templates utilizing a combination of typing and dictating with the [address] [place] [address] speech recognition software. As a result, the note may possibly contain various errors (e.g. spelling, grammar, and non-sensible words/phrases/statements) despite reviewing the note prior to signing it for completion. Time spent includes some or all of the following, both face-to-face time and non face-to-face time, but is not limited to:  [x] Preparing to see the patient by reviewing medical records available (notes, labs, imaging, etc.) prior to seeing the patient. [x] Obtaining and/or reviewing the history from the patient. [x] Performing a medically appropriate examination.   [x] Ordering of relevant lab work, medications, referrals, or procedures. [x] Discussing patient's medical issues and formulating an assessment and plan. [x] Reviewing plan of care with patient. Answering any questions or concerns. [x] Documentation within the electronic health record (EHR)  [] Reviewing records of history relevant to patient's issues after seeing the patient. [] Discussion or coordination of care with other health care professionals  [x] Other: length office visit: 50 minutes    Damon Ravi M.D.   [address] [place] [address]    Electronically signed by Pallavi Armstrong M.D. on 3/17/2022 at 6:08 PM.

## 2022-03-17 ENCOUNTER — OFFICE VISIT (OUTPATIENT)
Dept: FAMILY MEDICINE CLINIC | Age: 47
End: 2022-03-17
Payer: COMMERCIAL

## 2022-03-17 ENCOUNTER — TELEPHONE (OUTPATIENT)
Dept: ORTHOPEDIC SURGERY | Age: 47
End: 2022-03-17

## 2022-03-17 VITALS
BODY MASS INDEX: 26.44 KG/M2 | SYSTOLIC BLOOD PRESSURE: 130 MMHG | WEIGHT: 168.8 LBS | TEMPERATURE: 97.2 F | OXYGEN SATURATION: 98 % | DIASTOLIC BLOOD PRESSURE: 78 MMHG | HEART RATE: 98 BPM

## 2022-03-17 DIAGNOSIS — Z09 HOSPITAL DISCHARGE FOLLOW-UP: Primary | ICD-10-CM

## 2022-03-17 DIAGNOSIS — F41.1 GENERALIZED ANXIETY DISORDER WITH PANIC ATTACKS: ICD-10-CM

## 2022-03-17 DIAGNOSIS — J44.9 CHRONIC OBSTRUCTIVE PULMONARY DISEASE, UNSPECIFIED COPD TYPE (HCC): ICD-10-CM

## 2022-03-17 DIAGNOSIS — Z99.2 END-STAGE RENAL DISEASE ON HEMODIALYSIS (HCC): ICD-10-CM

## 2022-03-17 DIAGNOSIS — E11.42 TYPE 2 DIABETES MELLITUS WITH DIABETIC POLYNEUROPATHY, WITH LONG-TERM CURRENT USE OF INSULIN (HCC): ICD-10-CM

## 2022-03-17 DIAGNOSIS — G43.109 CHRONIC MIGRAINE WITH AURA: ICD-10-CM

## 2022-03-17 DIAGNOSIS — F41.0 GENERALIZED ANXIETY DISORDER WITH PANIC ATTACKS: ICD-10-CM

## 2022-03-17 DIAGNOSIS — N18.6 END-STAGE RENAL DISEASE ON HEMODIALYSIS (HCC): ICD-10-CM

## 2022-03-17 DIAGNOSIS — Z91.89 AT RISK FOR POLYPHARMACY: ICD-10-CM

## 2022-03-17 DIAGNOSIS — E78.2 MIXED HYPERLIPIDEMIA: ICD-10-CM

## 2022-03-17 DIAGNOSIS — F42.9 OBSESSIVE-COMPULSIVE DISORDER, UNSPECIFIED TYPE: ICD-10-CM

## 2022-03-17 DIAGNOSIS — Z79.4 TYPE 2 DIABETES MELLITUS WITH DIABETIC POLYNEUROPATHY, WITH LONG-TERM CURRENT USE OF INSULIN (HCC): ICD-10-CM

## 2022-03-17 PROCEDURE — 1036F TOBACCO NON-USER: CPT | Performed by: FAMILY MEDICINE

## 2022-03-17 PROCEDURE — G8417 CALC BMI ABV UP PARAM F/U: HCPCS | Performed by: FAMILY MEDICINE

## 2022-03-17 PROCEDURE — 99215 OFFICE O/P EST HI 40 MIN: CPT | Performed by: FAMILY MEDICINE

## 2022-03-17 PROCEDURE — 3044F HG A1C LEVEL LT 7.0%: CPT | Performed by: FAMILY MEDICINE

## 2022-03-17 PROCEDURE — G8484 FLU IMMUNIZE NO ADMIN: HCPCS | Performed by: FAMILY MEDICINE

## 2022-03-17 PROCEDURE — G8427 DOCREV CUR MEDS BY ELIG CLIN: HCPCS | Performed by: FAMILY MEDICINE

## 2022-03-17 PROCEDURE — 3023F SPIROM DOC REV: CPT | Performed by: FAMILY MEDICINE

## 2022-03-17 PROCEDURE — 2022F DILAT RTA XM EVC RTNOPTHY: CPT | Performed by: FAMILY MEDICINE

## 2022-03-17 PROCEDURE — 1111F DSCHRG MED/CURRENT MED MERGE: CPT | Performed by: FAMILY MEDICINE

## 2022-03-17 RX ORDER — FREMANEZUMAB-VFRM 225 MG/1.5ML
225 INJECTION SUBCUTANEOUS
Qty: 1.5 ML | Refills: 2 | Status: SHIPPED | OUTPATIENT
Start: 2022-03-17 | End: 2022-05-26

## 2022-03-17 RX ORDER — FLUVOXAMINE MALEATE 100 MG
100 TABLET ORAL NIGHTLY
Qty: 30 TABLET | Refills: 1 | Status: CANCELLED | OUTPATIENT
Start: 2022-03-17 | End: 2022-04-16

## 2022-03-17 RX ORDER — ATORVASTATIN CALCIUM 40 MG/1
40 TABLET, FILM COATED ORAL NIGHTLY
Qty: 30 TABLET | Refills: 3 | Status: CANCELLED | OUTPATIENT
Start: 2022-03-17

## 2022-03-17 RX ORDER — PROPRANOLOL HYDROCHLORIDE 80 MG/1
80 CAPSULE, EXTENDED RELEASE ORAL DAILY
Qty: 30 CAPSULE | Refills: 2 | Status: SHIPPED | OUTPATIENT
Start: 2022-03-17 | End: 2022-05-26

## 2022-03-17 RX ORDER — ATORVASTATIN CALCIUM 40 MG/1
40 TABLET, FILM COATED ORAL NIGHTLY
Qty: 30 TABLET | Refills: 11 | Status: SHIPPED | OUTPATIENT
Start: 2022-03-17

## 2022-03-17 RX ORDER — INSULIN LISPRO 100 [IU]/ML
0-6 INJECTION, SOLUTION INTRAVENOUS; SUBCUTANEOUS
Qty: 3 PEN | Refills: 0 | Status: SHIPPED | OUTPATIENT
Start: 2022-03-17 | End: 2022-03-17 | Stop reason: SDUPTHER

## 2022-03-17 RX ORDER — INSULIN LISPRO 100 [IU]/ML
0-6 INJECTION, SOLUTION INTRAVENOUS; SUBCUTANEOUS
Qty: 3 PEN | Refills: 0 | Status: SHIPPED | OUTPATIENT
Start: 2022-03-17 | End: 2022-05-26 | Stop reason: SDUPTHER

## 2022-03-17 RX ORDER — FLUVOXAMINE MALEATE 50 MG/1
50 TABLET, COATED ORAL NIGHTLY
Qty: 30 TABLET | Refills: 3 | Status: SHIPPED
Start: 2022-03-17 | End: 2022-05-26 | Stop reason: ALTCHOICE

## 2022-03-17 RX ORDER — PREGABALIN 75 MG/1
75 CAPSULE ORAL 2 TIMES DAILY
Qty: 60 CAPSULE | Refills: 2 | Status: CANCELLED | OUTPATIENT
Start: 2022-03-17 | End: 2022-04-16

## 2022-03-17 RX ORDER — BUPROPION HYDROCHLORIDE 150 MG/1
150 TABLET ORAL EVERY MORNING
Qty: 30 TABLET | Refills: 1 | Status: CANCELLED | OUTPATIENT
Start: 2022-03-17

## 2022-03-17 RX ORDER — BUPROPION HYDROCHLORIDE 150 MG/1
150 TABLET ORAL EVERY MORNING
Qty: 30 TABLET | Refills: 1 | Status: SHIPPED | OUTPATIENT
Start: 2022-03-17 | End: 2022-05-26

## 2022-03-17 RX ORDER — PREGABALIN 75 MG/1
75 CAPSULE ORAL DAILY
Qty: 30 CAPSULE | Refills: 2 | Status: SHIPPED | OUTPATIENT
Start: 2022-03-17 | End: 2022-05-26 | Stop reason: SDUPTHER

## 2022-03-17 RX ORDER — SEVELAMER CARBONATE FOR ORAL SUSPENSION 2400 MG/1
POWDER, FOR SUSPENSION ORAL
COMMUNITY
Start: 2022-02-11 | End: 2022-05-26

## 2022-03-17 RX ORDER — LORAZEPAM 0.5 MG/1
TABLET ORAL
COMMUNITY
Start: 2022-02-28 | End: 2022-05-26

## 2022-03-17 RX ORDER — ALPRAZOLAM 2 MG/1
2 TABLET, EXTENDED RELEASE ORAL EVERY MORNING
Qty: 30 TABLET | Refills: 0 | Status: SHIPPED | OUTPATIENT
Start: 2022-03-17 | End: 2022-04-20

## 2022-03-17 RX ORDER — ALBUTEROL SULFATE 90 UG/1
2 AEROSOL, METERED RESPIRATORY (INHALATION) EVERY 6 HOURS PRN
Qty: 18 G | Refills: 1 | Status: CANCELLED | OUTPATIENT
Start: 2022-03-17

## 2022-03-17 ASSESSMENT — ENCOUNTER SYMPTOMS
VOMITING: 0
BLOOD IN STOOL: 0
COUGH: 0
SHORTNESS OF BREATH: 0
RHINORRHEA: 0
DIARRHEA: 0
SINUS PRESSURE: 0
ABDOMINAL DISTENTION: 0
BACK PAIN: 0
WHEEZING: 0
CHEST TIGHTNESS: 0
ABDOMINAL PAIN: 0
NAUSEA: 0
TROUBLE SWALLOWING: 0
CONSTIPATION: 0

## 2022-03-17 NOTE — TELEPHONE ENCOUNTER
PA submitted via CM for Bevespi Aerosphere 9-4. 8MCG/ACT aerosol.   [place]: Radha PRICE Case ID: [id number] - Rx #: [id number]    STATUS: PENDING

## 2022-03-17 NOTE — TELEPHONE ENCOUNTER
PA submitted via CMM for AJOVY (fremanezumab-vfrm) injection 225MG/1.5ML syringes.   Key: [id number] - PA Case ID: [id number] - Rx #: [id number]    STATUS: PENDING

## 2022-03-21 NOTE — TELEPHONE ENCOUNTER
Received DENIAL for AJOVY (fremanezumab-vfrm) injection 225MG/1.5ML syringes. Denial letter attached.

## 2022-03-22 RX ORDER — ATOGEPANT 10 MG/1
10 TABLET ORAL DAILY
Qty: 30 TABLET | Refills: 5 | Status: SHIPPED | OUTPATIENT
Start: 2022-03-22 | End: 2022-05-26 | Stop reason: SDUPTHER

## 2022-03-22 NOTE — TELEPHONE ENCOUNTER
Please notify patient of denial. I will go ahead and order Qulipta 10 mg once daily, which is essentially an oral version of Ajovy. A community [facility] based in [facility] will try to get the PA approved for [place] and should mail the medication to her. Damon Ang [address]

## 2022-04-20 DIAGNOSIS — F41.0 GENERALIZED ANXIETY DISORDER WITH PANIC ATTACKS: ICD-10-CM

## 2022-04-20 DIAGNOSIS — F41.1 GENERALIZED ANXIETY DISORDER WITH PANIC ATTACKS: ICD-10-CM

## 2022-04-20 RX ORDER — ALPRAZOLAM 2 MG/1
TABLET, EXTENDED RELEASE ORAL
Qty: 30 TABLET | Refills: 0 | Status: SHIPPED | OUTPATIENT
Start: 2022-04-20 | End: 2022-05-20

## 2022-04-21 NOTE — TELEPHONE ENCOUNTER
Pt requesting refill on ALPRAZOLAM XR 2 MG extended release tablet.      Pharmacy: [facility] [id number]  Best pt contact: [phone]  LOV: 3/17/22  NOV: 5/19/22

## 2022-05-20 DIAGNOSIS — F41.0 GENERALIZED ANXIETY DISORDER WITH PANIC ATTACKS: ICD-10-CM

## 2022-05-20 DIAGNOSIS — F41.1 GENERALIZED ANXIETY DISORDER WITH PANIC ATTACKS: ICD-10-CM

## 2022-05-20 RX ORDER — ALPRAZOLAM 2 MG/1
2 TABLET, EXTENDED RELEASE ORAL EVERY MORNING
Qty: 30 TABLET | Refills: 0 | Status: SHIPPED | OUTPATIENT
Start: 2022-05-20 | End: 2022-06-20

## 2022-05-21 NOTE — PROGRESS NOTES
Luana Chapin   55 y.o. female   1975    HPI:    Patient was last seen by me on 3/17/2022. During our last office visit:   -Seen for hospital D/C follow-up. Type II DM: reintroduce Trulicity and start Lantus back at 10 units along with rapid acting insulin. Chronic migraines: started patient on Ajovy for migraine prevention. Given she has issues of hypotension, will try and avoid increasing Propranolol. Will avoid topiramate b/c she's already on GLP-1 agonist, which can cause appetite suppression. COPD: started patient on Breztri. Anxiety and panic attacks: Luvox was decreased from 100 mg to 50 mg daily. Reason(s) for visit:   Chief Complaint   Patient presents with    Hypertension     Pt's nephrologist stopped all BP medication. BP was dropping too low during dialysis.  Medication Refill    Migraine     Ajovy was denied by insurance and nephrologist took pt off of inderal which was helping. Sent appeal to PA department today. Interval history:  -Patient stated that she has developed persistent hypotension (SBP 60's) whenever she has dialysis TID. She stated that she was taken off her all her BP meds including propranolol. She stated that propranolol was effective with preventing her migraines. -BP at home -120's, DBP 60-70, HR - 70's. Anxiety/depression:  -Meds tried: Sertraline, Fluoxetine, Fluvoxamine, Xanax, Bupropion  -GeneSight test done in Dec 2021.  -Happy with Alprazolam 2 mg ER. Chronic migraines:  -Meds tried: Ajovy, Tylenol. Can't used NSAID.  -Patient stated she's still having 15 migraine days a month    Type II diabetes mellitus:  Regarding diabetes, patient is on medications as noted below in his medication list. Since the last visit. ..  -Last A1c =    Lab Results   Component Value Date    LABA1C 6.7 01/05/2022    LABA1C 6.6 01/04/2022    LABA1C 6.7 12/27/2021     Lab Results   Component Value Date    LABMICR YES 02/20/2022    [address] 02/24/2021 CREATININE 9.0 (HH) 03/07/2022     -Stated her A1c was checked at her nephrologist's office recently and it was in 6's. -Glucose readings (on average): hasn't checked glucose readings  -Nighttime awakenings: no issues reported.  -Neuropathy symptoms: no issues reported. -Gastroparesis symptoms: none  -Medication adherence: Trulicity initially had an effect on her appetite, but lately it has not. ESRD:  -Patient on hemodialysis and managed by nephrologist.  -She has gained 40 lbs since our LOV.     COPD:  -No issues with SOB, chest tightness, wheezing.  -Bevespi was switched to [employer] b/c of insurance coverage in March 2022. Other:  -Stated that she might be in menopause. LMP was almost 12 mo ago. Reported of no issues with libido, but has anorgasmia for past 6 months.   -Requests wheelchair.  -Reported of issues that she has episodes of agitation overnight that recalls very little and vivid dreams. She reports that her O2 at night drops in 80-90's per her pulse oximeter. Allergies   Allergen Reactions    Amoxicillin Hives, Itching and Other (See Comments)     Tolerates cephalosporins  Patient tolerating cefazolin (ANCEF) as of October 11, 2018      Levofloxacin Anaphylaxis    Vancomycin Anaphylaxis, Hives and Shortness Of Breath    Tape [Adhesive Tape] Other (See Comments)     Paper tape turns skin bright red. Plastic tape okay. Current Outpatient Medications on File Prior to Visit   Medication Sig Dispense Refill    furosemide (LASIX) 80 MG tablet Take 80 mg by mouth daily       ALPRAZolam (ALPRAZOLAM XR) 2 MG extended release tablet Take 1 tablet by mouth every morning for 30 days.  30 tablet 0    atorvastatin (LIPITOR) 40 MG tablet Take 1 tablet by mouth nightly 30 tablet 11    insulin glargine (LANTUS;BASAGLAR) 100 UNIT/ML injection pen Inject 10 Units into the skin nightly 5 pen 3    albuterol sulfate  (90 Base) MCG/ACT inhaler Inhale 2 puffs into the lungs every 6 hours as needed for Wheezing or Shortness of Breath 18 g 1    aspirin 81 MG EC tablet Take 1 tablet by mouth daily 30 tablet 3     No current facility-administered medications on file prior to visit. Family History   Problem Relation Age of Onset    Diabetes Mother     Other Mother 79        Covid    Diabetes Father     High Blood Pressure Father     Colon Cancer Father     Diabetes Sister     Alcohol Abuse Maternal Grandfather     Diabetes Paternal Grandmother     Alcohol Abuse Paternal Grandfather     Diabetes Paternal Aunt     Diabetes Paternal Uncle        Social History     Tobacco Use    Smoking status: Former Smoker     Packs/day: 1.00     Types: Cigarettes    Smokeless tobacco: Never Used    Tobacco comment: Quit in August 2021   Substance Use Topics    Alcohol use: No     Comment: Very Rare        Lab Results   Component Value Date    WBC 17.6 (H) 03/07/2022    HGB 11.0 (L) 03/07/2022    HCT 34.6 (L) 03/07/2022    MCV 82.8 03/07/2022     03/07/2022         Chemistry        Component Value Date/Time     (L) 03/07/2022 0619    K 4.9 03/07/2022 0619    K 4.9 02/20/2022 1328    CL 97 (L) 03/07/2022 0619    CO2 19 (L) 03/07/2022 0619    BUN 70 (H) 03/07/2022 0619    CREATININE 9.0 (HH) 03/07/2022 0619        Component Value Date/Time    CALCIUM 8.8 03/07/2022 0619    ALKPHOS 164 (H) 03/01/2022 0326    AST 21 03/01/2022 0326    ALT <5 (L) 03/01/2022 0326    BILITOT 0.4 03/01/2022 0326          Lab Results   Component Value Date    ALT <5 (L) 03/01/2022    AST 21 03/01/2022    ALKPHOS 164 (H) 03/01/2022    BILITOT 0.4 03/01/2022       Review of Systems   Constitutional: Positive for activity change and fatigue. Negative for appetite change, fever and unexpected weight change. HENT: Negative for congestion, rhinorrhea, sinus pressure and trouble swallowing. Respiratory: Negative for cough, chest tightness, shortness of breath and wheezing.     Cardiovascular: Negative for chest pain, palpitations and leg swelling. Gastrointestinal: Negative for abdominal distention, abdominal pain, blood in stool, constipation, diarrhea, nausea and vomiting. Genitourinary: Negative for dysuria, frequency and hematuria. Musculoskeletal: Negative for arthralgias and back pain. Skin: Negative for rash. Neurological: Negative for dizziness, weakness, light-headedness, numbness and headaches. Psychiatric/Behavioral: Positive for agitation and sleep disturbance. The patient is nervous/anxious. Wt Readings from Last 3 Encounters:   05/26/22 208 lb 6.4 oz (94.5 kg)   03/17/22 168 lb 12.8 oz (76.6 kg)   03/01/22 146 lb (66.2 kg)       BP Readings from Last 3 Encounters:   05/26/22 122/80   03/17/22 130/78   03/02/22 (!) 151/75       Pulse Readings from Last 3 Encounters:   05/26/22 (!) 106   03/17/22 98   03/02/22 71       /80   Pulse (!) 106   Temp 97.2 °F (36.2 °C)   Wt 208 lb 6.4 oz (94.5 kg)   SpO2 100%   BMI 32.64 kg/m²      Physical Exam  Vitals reviewed. Constitutional:       General: She is awake. She is not in acute distress. Appearance: She is obese. She is not ill-appearing or diaphoretic. HENT:      Head: Normocephalic and atraumatic. No abrasion or masses. Hair is normal.      Right Ear: External ear normal.      Left Ear: External ear normal.      Nose: Nose normal.   Eyes:      General: Lids are normal. Gaze aligned appropriately. No scleral icterus. Right eye: No discharge. Left eye: No discharge. Extraocular Movements: Extraocular movements intact. Conjunctiva/sclera: Conjunctivae normal.   Neck:      Trachea: Phonation normal.   Cardiovascular:      Rate and Rhythm: Regular rhythm. Tachycardia present. Pulmonary:      Effort: Pulmonary effort is normal. No respiratory distress. Breath sounds: No wheezing, rhonchi or rales. Abdominal:      General: Abdomen is flat. There is no distension. Palpations: Abdomen is soft. Musculoskeletal:         General: No deformity. Normal range of motion. Cervical back: Normal range of motion. No erythema. Right lower leg: No edema. Left lower leg: No edema. Skin:     Coloration: Skin is not cyanotic, jaundiced or pale. Findings: No abrasion, abscess, bruising, ecchymosis, erythema, signs of injury, laceration, lesion, petechiae, rash or wound. Neurological:      General: No focal deficit present. Mental Status: She is alert. Mental status is at baseline. GCS: GCS eye subscore is 4. GCS verbal subscore is 5. GCS motor subscore is 6. Cranial Nerves: No cranial nerve deficit, dysarthria or facial asymmetry. Motor: No weakness, tremor, atrophy or seizure activity. Coordination: Coordination normal.      Gait: Gait is intact. Psychiatric:         Attention and Perception: Attention and perception normal.         Mood and Affect: Mood and affect normal.         Speech: Speech normal.         Behavior: Behavior normal. Behavior is cooperative. Thought Content: Thought content normal.       Assessment/Plan:   Sony Dick was seen today for hypertension, medication refill and migraine. Diagnoses and all orders for this visit:    Chronic migraine with aura  Comments:  Patient has ESRD and is on dialysis. Therefore, she is unable to use so many medications: NSAIDs, ARB/ace inhibitors, propranolol and verapamil (due to issues with hypotension). Topiramate could worsen issues with CKD. Orders:  -     Atogepant (QULIPTA) 10 MG TABS; Take 10 mg by mouth daily  -     rizatriptan (MAXALT) 10 MG tablet; Take 1 tablet by mouth once as needed for Migraine May repeat in 2 hours if needed    Generalized anxiety disorder with panic attacks  Comments: Will start patient on Viibryd. I discussed with patient the difference between acute vs preventative/maintenance medications regarding treatment of anxiety/depression/bipolar disorder.  Drugs of the SSRI/SNRI class as well as anti-psychotics can have side effects such as weight gain, sexual dysfunction, insomnia, headache, abdominal discomfort, nausea, vomiting, etc. These medications are generally effective at alleviating symptoms of anxiety and/or depression, but side effects tend to occur within the first 1-2 weeks of taking the medication. Patient was informed to let me know if any significant side effects do occur. Patient was instructed to take the medication(s) every day as directed and that this medication is not an as needed medication because the medication may take at least 2 weeks to see a difference if not at least 4-6 weeks to have a beneficial effect and that by going even 1-2 days without taking the medication one could risk of having rebound anxiety/depression. In addition, the patient was informed that this medication cannot be abruptly stopped after having had taken it for a long period of time and that the medication would have to be gradually tapered off under the guidance of a healthcare provider. The patient was informed that 4-6 weeks follow-up is generally recommended follow-up time for starting/adding a new medication or with adjusting the dose of any given medication. I have discussed with patient (at some point) and made aware that some patients may benefit from more than one medication compared to monotherapy. A combination of both medical and behavioral therapy may yield better/more effective results than either therapy alone. Lastly, it's worth mentioning that the medication(s) prescribed will not completely resolve feelings of anxiety/depression as well as make one immune or completely prevent from having more issues with anxiety and/or depression. Stress (emotional & physical) is part of everyday life.     Orders:  -     vilazodone HCl (VILAZODONE HCL) 20 MG TABS; Take 1 tablet by mouth daily    Hemodialysis-associated hypotension  Comments:  Patient was advised to use Midodrine for hypotension (SBP<90, DBP<60). She has taken it before when she was hospitalized earlier this year and had no issues. Orders:  -     midodrine (PROAMATINE) 5 MG tablet; Take 1 tablet by mouth 3 times daily    End-stage renal disease on hemodialysis [facility])  Comments:  Managed by nephrologist.  Follow up accordingly. Orders:  -     vilazodone HCl (VILAZODONE HCL) 20 MG TABS; Take 1 tablet by mouth daily    Type 2 diabetes mellitus with diabetic polyneuropathy, with long-term current use of insulin ([facility])  Comments:  DM controlled and stable. Orders:  -     pregabalin (LYRICA) 75 MG capsule; Take 1 capsule by mouth daily for 30 days. -     insulin lispro, 1 Unit Dial, 100 UNIT/ML SOPN; Inject 0-6 Units into the skin 3 times daily (with meals) Per sliding scale  -     Dulaglutide (TRULICITY) 3 MD/5.6HJ SOPN; Inject 3 mg into the skin once a week    Type 2 diabetes mellitus with obesity ([address]  Comments: Will increase Trulicity to minimize weight gain and help minimize use of insulin. Chronic obstructive pulmonary disease, unspecified COPD type ([address]  Comments:  Stable. Orders:  -     tiotropium-olodaterol (STIOLTO RESPIMAT) 2.5-2.5 MCG/ACT AERS; Inhale 2 puffs into the lungs daily  -     vilazodone HCl (VILAZODONE HCL) 20 MG TABS; Take 1 tablet by mouth daily    Nocturnal hypoxia  Comments:  Discussed with patient about referral to pulmonologist. May possibly have underlying GABRIELA and need sleep study. Orders:  -     Keaton Jose MD, Pulmonary, Critical Care & Sleep, [facility]    Female sexual dysfunction  Comments:  Informed patient could be related to menopause and/or SSRI use with Fluvoxamine. . Discussed some SSRIs have lower risk of causing sexual dysfunction. Adult night terror  Comments:  Could be related to hypoxia and/or underlying PTSD (?) issue. She may benefit from supplemental noctural O2.     Uses roller walker  -     Handicap Placard MISC; by Does not apply route Expires 5/26/2027    Macular degeneration of both eyes, unspecified type  -     DME Order for Tariq Hardik as OP    At risk for polypharmacy    Menopause present      I reviewed the plan of care with the patient. Patient acknowledged understanding and agreed with plan of care overall. Medications Discontinued During This Encounter   Medication Reason    propranolol (INDERAL LA) 80 MG extended release capsule LIST CLEANUP    Nasal Dilators (BREATHE RIGHT EXTRA STRENGTH) STRP LIST CLEANUP    Misc. Devices (PULSE OXIMETER) MISC LIST CLEANUP    lisinopril (PRINIVIL;ZESTRIL) 10 MG tablet LIST CLEANUP    Insulin Pen Needle 29G X 12.7MM MISC LIST CLEANUP    Heat Wraps (THERMACARE BACK/HIP) MISC LIST CLEANUP    glucose (GLUTOSE) 40 % GEL LIST CLEANUP    Glucagon, rDNA, (GLUCAGON EMERGENCY) 1 MG KIT LIST CLEANUP    Fremanezumab-vfrm (AJOVY) 225 MG/1.5ML SOSY LIST CLEANUP    buPROPion (WELLBUTRIN XL) 150 MG extended release tablet LIST CLEANUP    sevelamer (RENVELA) 2.4 g PACK packet LIST CLEANUP    LORazepam (ATIVAN) 0.5 MG tablet LIST CLEANUP    Dulaglutide 1.5 MG/0.5ML SOPN DOSE ADJUSTMENT    Atogepant (QULIPTA) 10 MG TABS DUPLICATE    pregabalin (LYRICA) 75 MG capsule REORDER    insulin lispro, 1 Unit Dial, 100 UNIT/ML SOPN REORDER    tiotropium-olodaterol (STIOLTO RESPIMAT) 2.5-2.5 MCG/ACT AERS REORDER    fluvoxaMINE (LUVOX) 50 MG tablet Alternate therapy        General information on medications:  -When it comes to medications, whether with starting or adding a new medication or increasing the dose of a current medication, the benefits and risks have to always be considered and weighed over, especially if one is taking other medications as well.    -There are no medications that have no side effects and that there is always a risk involved with taking a medication.    -If a side effect were to occur with starting a new medication or with increasing the dose of a current medication that either the medication can be totally discontinued altogether or simply decrease the dose of it and if this would be the case a follow-up appointment would be deemed necessary.    -The drug allergy list will then be updated with the corresponding side effect(s) if it's deemed to be a true 'drug allergy'. -The most common adverse effects of medication(s) were addressed at today's visit.    -Lastly, the coverage status of a medication may vary from insurance to insurance and the only way to verify if the medication is covered is to send an actual prescription in.    -The drug formulary of each insurance changes without any warning or notification to the healthcare provider let alone the pharmacy.  -The cost of medications vary from insurance to insurance and the cost is always subject to change just like the drug formulary. Follow-up: Return in about 8 weeks (around 7/21/2022) for IP - migraine. .     Patient was informed that if his or her symptoms worsen to follow up with me sooner or go to the nearest ER if the symptoms are very significant and warrant higher level of care. Regarding my note:  -This note was composed (by me only and not with assistance via a scribe) to the best of my knowledge and recollection of the encounter with the patient using one of my own customized note templates utilizing a combination of typing and dictating with the [address] [place] [address] speech recognition software. As a result, the note may possibly contain various errors (e.g. spelling, grammar, and non-sensible words/phrases/statements) despite reviewing the note prior to signing it for completion. Time spent includes some or all of the following, both face-to-face time and non face-to-face time, but is not limited to:  [x] Preparing to see the patient by reviewing medical records available (notes, labs, imaging, etc.) prior to seeing the patient. [x] Obtaining and/or reviewing the history from the patient.   [x] Performing a medically appropriate examination. [x] Ordering of relevant lab work, medications, referrals, or procedures. [x] Discussing patient's medical issues and formulating an assessment and plan. [x] Reviewing plan of care with patient. Answering any questions or concerns. [x] Documentation within the electronic health record (EHR)  [] Reviewing records of history relevant to patient's issues after seeing the patient. [] Discussion or coordination of care with other health care professionals  [x] Other: length office visit - 40 minutes.  -I spent a significant amount of time discussing various issues as noted above and also with formulating a treatment plan for each specific issue. Patient was given the opportunity to ask me any questions and address any concerns/issues. I also reviewed lab work (if available) as well as prior notes from PCP and/or other specialists if available. Damon Beard M.D.   [address] [place] [address]    Electronically signed by Michelle King MD M.D. on 5/26/2022 at 5:17 PM.

## 2022-05-26 ENCOUNTER — OFFICE VISIT (OUTPATIENT)
Dept: FAMILY MEDICINE CLINIC | Age: 47
End: 2022-05-26
Payer: COMMERCIAL

## 2022-05-26 ENCOUNTER — TELEPHONE (OUTPATIENT)
Dept: FAMILY MEDICINE CLINIC | Age: 47
End: 2022-05-26

## 2022-05-26 VITALS
DIASTOLIC BLOOD PRESSURE: 80 MMHG | TEMPERATURE: 97.2 F | BODY MASS INDEX: 32.64 KG/M2 | HEART RATE: 106 BPM | SYSTOLIC BLOOD PRESSURE: 122 MMHG | OXYGEN SATURATION: 100 % | WEIGHT: 208.4 LBS

## 2022-05-26 DIAGNOSIS — Z78.0 MENOPAUSE PRESENT: ICD-10-CM

## 2022-05-26 DIAGNOSIS — Z79.4 TYPE 2 DIABETES MELLITUS WITH DIABETIC POLYNEUROPATHY, WITH LONG-TERM CURRENT USE OF INSULIN (HCC): ICD-10-CM

## 2022-05-26 DIAGNOSIS — E11.69 TYPE 2 DIABETES MELLITUS WITH OBESITY (HCC): ICD-10-CM

## 2022-05-26 DIAGNOSIS — Z91.89 AT RISK FOR POLYPHARMACY: ICD-10-CM

## 2022-05-26 DIAGNOSIS — F41.0 GENERALIZED ANXIETY DISORDER WITH PANIC ATTACKS: ICD-10-CM

## 2022-05-26 DIAGNOSIS — Z99.2 END-STAGE RENAL DISEASE ON HEMODIALYSIS (HCC): ICD-10-CM

## 2022-05-26 DIAGNOSIS — E11.42 TYPE 2 DIABETES MELLITUS WITH DIABETIC POLYNEUROPATHY, WITH LONG-TERM CURRENT USE OF INSULIN (HCC): ICD-10-CM

## 2022-05-26 DIAGNOSIS — G43.109 CHRONIC MIGRAINE WITH AURA: Primary | ICD-10-CM

## 2022-05-26 DIAGNOSIS — Z99.89 USES ROLLER WALKER: ICD-10-CM

## 2022-05-26 DIAGNOSIS — I95.3 HEMODIALYSIS-ASSOCIATED HYPOTENSION: ICD-10-CM

## 2022-05-26 DIAGNOSIS — F51.4 ADULT NIGHT TERROR: ICD-10-CM

## 2022-05-26 DIAGNOSIS — F41.1 GENERALIZED ANXIETY DISORDER WITH PANIC ATTACKS: ICD-10-CM

## 2022-05-26 DIAGNOSIS — F52.9 FEMALE SEXUAL DYSFUNCTION: ICD-10-CM

## 2022-05-26 DIAGNOSIS — E66.9 TYPE 2 DIABETES MELLITUS WITH OBESITY (HCC): ICD-10-CM

## 2022-05-26 DIAGNOSIS — H35.30 MACULAR DEGENERATION OF BOTH EYES, UNSPECIFIED TYPE: ICD-10-CM

## 2022-05-26 DIAGNOSIS — G47.34 NOCTURNAL HYPOXIA: ICD-10-CM

## 2022-05-26 DIAGNOSIS — N18.6 END-STAGE RENAL DISEASE ON HEMODIALYSIS (HCC): ICD-10-CM

## 2022-05-26 DIAGNOSIS — J44.9 CHRONIC OBSTRUCTIVE PULMONARY DISEASE, UNSPECIFIED COPD TYPE (HCC): ICD-10-CM

## 2022-05-26 PROCEDURE — G8417 CALC BMI ABV UP PARAM F/U: HCPCS | Performed by: FAMILY MEDICINE

## 2022-05-26 PROCEDURE — G8427 DOCREV CUR MEDS BY ELIG CLIN: HCPCS | Performed by: FAMILY MEDICINE

## 2022-05-26 PROCEDURE — 1036F TOBACCO NON-USER: CPT | Performed by: FAMILY MEDICINE

## 2022-05-26 PROCEDURE — 3044F HG A1C LEVEL LT 7.0%: CPT | Performed by: FAMILY MEDICINE

## 2022-05-26 PROCEDURE — 2022F DILAT RTA XM EVC RTNOPTHY: CPT | Performed by: FAMILY MEDICINE

## 2022-05-26 PROCEDURE — 99215 OFFICE O/P EST HI 40 MIN: CPT | Performed by: FAMILY MEDICINE

## 2022-05-26 PROCEDURE — 3023F SPIROM DOC REV: CPT | Performed by: FAMILY MEDICINE

## 2022-05-26 RX ORDER — FUROSEMIDE 80 MG
80 TABLET ORAL DAILY
COMMUNITY
Start: 2022-04-20

## 2022-05-26 RX ORDER — INSULIN LISPRO 100 [IU]/ML
0-6 INJECTION, SOLUTION INTRAVENOUS; SUBCUTANEOUS
Qty: 3 PEN | Refills: 0 | Status: CANCELLED | OUTPATIENT
Start: 2022-05-26

## 2022-05-26 RX ORDER — PREGABALIN 75 MG/1
75 CAPSULE ORAL DAILY
Qty: 30 CAPSULE | Refills: 2 | Status: CANCELLED | OUTPATIENT
Start: 2022-05-26 | End: 2022-06-25

## 2022-05-26 RX ORDER — BUPROPION HYDROCHLORIDE 150 MG/1
150 TABLET ORAL EVERY MORNING
Qty: 30 TABLET | Refills: 1 | Status: CANCELLED | OUTPATIENT
Start: 2022-05-26

## 2022-05-26 RX ORDER — FREMANEZUMAB-VFRM 225 MG/1.5ML
225 INJECTION SUBCUTANEOUS
Qty: 1.5 ML | Refills: 2 | Status: CANCELLED | OUTPATIENT
Start: 2022-05-26

## 2022-05-26 RX ORDER — PROPRANOLOL HYDROCHLORIDE 80 MG/1
80 CAPSULE, EXTENDED RELEASE ORAL DAILY
Qty: 30 CAPSULE | Refills: 2 | Status: CANCELLED | OUTPATIENT
Start: 2022-05-26

## 2022-05-26 RX ORDER — VILAZODONE HYDROCHLORIDE 20 MG/1
20 TABLET ORAL DAILY
Qty: 30 TABLET | Refills: 1 | Status: SHIPPED
Start: 2022-05-26 | End: 2022-05-27 | Stop reason: CLARIF

## 2022-05-26 RX ORDER — PREGABALIN 75 MG/1
75 CAPSULE ORAL DAILY
Qty: 30 CAPSULE | Refills: 2 | Status: SHIPPED | OUTPATIENT
Start: 2022-05-26 | End: 2022-07-21 | Stop reason: SDUPTHER

## 2022-05-26 RX ORDER — LISINOPRIL 10 MG/1
10 TABLET ORAL DAILY
Qty: 30 TABLET | Refills: 3 | Status: CANCELLED | OUTPATIENT
Start: 2022-05-26

## 2022-05-26 RX ORDER — ATOGEPANT 10 MG/1
10 TABLET ORAL DAILY
Qty: 30 TABLET | Refills: 5 | Status: SHIPPED | OUTPATIENT
Start: 2022-05-26 | End: 2022-07-21 | Stop reason: SDUPTHER

## 2022-05-26 RX ORDER — INSULIN LISPRO 100 [IU]/ML
0-6 INJECTION, SOLUTION INTRAVENOUS; SUBCUTANEOUS
Qty: 3 PEN | Refills: 0 | Status: SHIPPED | OUTPATIENT
Start: 2022-05-26 | End: 2022-07-21 | Stop reason: SDUPTHER

## 2022-05-26 RX ORDER — MIDODRINE HYDROCHLORIDE 5 MG/1
5 TABLET ORAL 3 TIMES DAILY
Qty: 90 TABLET | Refills: 0 | Status: SHIPPED | OUTPATIENT
Start: 2022-05-26 | End: 2022-07-11

## 2022-05-26 RX ORDER — RIZATRIPTAN BENZOATE 10 MG/1
10 TABLET ORAL
Qty: 9 TABLET | Refills: 5 | Status: ON HOLD | OUTPATIENT
Start: 2022-05-26 | End: 2022-09-05

## 2022-05-26 RX ORDER — DULAGLUTIDE 3 MG/.5ML
3 INJECTION, SOLUTION SUBCUTANEOUS WEEKLY
Qty: 4 PEN | Refills: 2 | Status: SHIPPED | OUTPATIENT
Start: 2022-05-26 | End: 2022-07-21 | Stop reason: SDUPTHER

## 2022-05-26 ASSESSMENT — ENCOUNTER SYMPTOMS
SINUS PRESSURE: 0
SHORTNESS OF BREATH: 0
CONSTIPATION: 0
BACK PAIN: 0
VOMITING: 0
CHEST TIGHTNESS: 0
COUGH: 0
ABDOMINAL DISTENTION: 0
ABDOMINAL PAIN: 0
DIARRHEA: 0
RHINORRHEA: 0
WHEEZING: 0
NAUSEA: 0
TROUBLE SWALLOWING: 0
BLOOD IN STOOL: 0

## 2022-05-26 ASSESSMENT — PATIENT HEALTH QUESTIONNAIRE - PHQ9
9. THOUGHTS THAT YOU WOULD BE BETTER OFF DEAD, OR OF HURTING YOURSELF: 0
SUM OF ALL RESPONSES TO PHQ9 QUESTIONS 1 & 2: 0
SUM OF ALL RESPONSES TO PHQ QUESTIONS 1-9: 1
2. FEELING DOWN, DEPRESSED OR HOPELESS: 0
6. FEELING BAD ABOUT YOURSELF - OR THAT YOU ARE A FAILURE OR HAVE LET YOURSELF OR YOUR FAMILY DOWN: 0
SUM OF ALL RESPONSES TO PHQ QUESTIONS 1-9: 1
7. TROUBLE CONCENTRATING ON THINGS, SUCH AS READING THE NEWSPAPER OR WATCHING TELEVISION: 0
10. IF YOU CHECKED OFF ANY PROBLEMS, HOW DIFFICULT HAVE THESE PROBLEMS MADE IT FOR YOU TO DO YOUR WORK, TAKE CARE OF THINGS AT HOME, OR GET ALONG WITH OTHER PEOPLE: 0
5. POOR APPETITE OR OVEREATING: 1
3. TROUBLE FALLING OR STAYING ASLEEP: 0
1. LITTLE INTEREST OR PLEASURE IN DOING THINGS: 0
SUM OF ALL RESPONSES TO PHQ QUESTIONS 1-9: 1
SUM OF ALL RESPONSES TO PHQ QUESTIONS 1-9: 1
8. MOVING OR SPEAKING SO SLOWLY THAT OTHER PEOPLE COULD HAVE NOTICED. OR THE OPPOSITE, BEING SO FIGETY OR RESTLESS THAT YOU HAVE BEEN MOVING AROUND A LOT MORE THAN USUAL: 0
4. FEELING TIRED OR HAVING LITTLE ENERGY: 0

## 2022-05-26 NOTE — CARE COORDINATION
Discharge Planning Assessment  Readmission risk score 25%  RN discharge planner met with patient/ (and family member) to discuss reason for admission, current living situation, and potential needs at the time of discharge    Demographics/Insurance verified Yes all information verified per face sheet    Current type of dwelling:condo with no steps to enter    Patient from ECF/SW confirmed with:n/a    Living arrangements:with     Level of function/Support: patient reports 8 falls in past few weeks, one when her abdomen hit the sink ledge and she bounced off striking her head    PCP: ZANDRA Haque    Last Visit to PCP: in past 2 weeks    DME:walker, sh chair, grab bars    Active with any community resources/agencies/skilled home care: none    Medication compliance issues:not reported, uses the [facility] on [address]    Financial issues that could impact healthcare:  [facility]      Tentative discharge plan: home, may need HH    *Discussed and provided facilities of choice if transition to a skilled nursing facility is required at the time of discharge      *Discussed with patient and/or family that on the day of discharge home tentative time of discharge will be between 10 AM and noon.     Transportation at the time of discharge:    Martina Contreras, BSN, CCM, RN  [facility]  [phone] normal mood with appropriate affect

## 2022-05-26 NOTE — TELEPHONE ENCOUNTER
Received denial for Ajovy injections. Last letter was scanned in back in March. Pt would like this decision appealed. Nephrologist took pt off of inderal, unable to that this medication due to BP dropping during dialysis. (Inderal was helping pt's migraines). Please advise. Pt would like a call once we hear something back.

## 2022-05-27 ENCOUNTER — TELEPHONE (OUTPATIENT)
Dept: ORTHOPEDIC SURGERY | Age: 47
End: 2022-05-27

## 2022-05-27 RX ORDER — VENLAFAXINE HYDROCHLORIDE 37.5 MG/1
37.5 CAPSULE, EXTENDED RELEASE ORAL DAILY
Qty: 30 CAPSULE | Refills: 1 | Status: SHIPPED
Start: 2022-05-27 | End: 2022-07-21 | Stop reason: ALTCHOICE

## 2022-05-27 NOTE — TELEPHONE ENCOUNTER
Denial letter states that patient must have a 30-day trial of Three of the following: Beta Blockers such as Propranolol and Timolol, Neuroleptics such as Olanzapine and Quetiapine, Tricyclic Antidepressants such as Amitriptyline and Doxepin, and SNRIs such as Venlafaxine. I can tell [facility] that patient cannot take the beta blockers. What about the other medication categories? Is there a reason why patient cannot take any of these? Please respond to the pool ( P [facility] [address] [facility] Thank you!

## 2022-05-27 NOTE — TELEPHONE ENCOUNTER
Submitted PA for Viibryd 20MG tablets  [facility] Key: [id number] STATUS: APPROVED 04/27/22 - 05/27/23; Approval letter attached. If this requires a response please respond to the pool ( P [facility] [address]). Thank you please advise patient.

## 2022-05-27 NOTE — TELEPHONE ENCOUNTER
Submitted PA for Qulipta 10MG tablets  [facility]ia [facility] Key: [id number] STATUS: APPROVED 03/27/22 - 05/28/23; Approval letter attached. If this requires a response please respond to the pool ( P [facility] [address]). Thank you please advise patient.

## 2022-06-19 DIAGNOSIS — F41.0 GENERALIZED ANXIETY DISORDER WITH PANIC ATTACKS: ICD-10-CM

## 2022-06-19 DIAGNOSIS — F41.1 GENERALIZED ANXIETY DISORDER WITH PANIC ATTACKS: ICD-10-CM

## 2022-06-20 RX ORDER — ALPRAZOLAM 2 MG/1
TABLET, EXTENDED RELEASE ORAL
Qty: 30 TABLET | Refills: 0 | Status: SHIPPED | OUTPATIENT
Start: 2022-06-20 | End: 2022-07-21 | Stop reason: SDUPTHER

## 2022-06-28 ENCOUNTER — TELEPHONE (OUTPATIENT)
Dept: FAMILY MEDICINE CLINIC | Age: 47
End: 2022-06-28

## 2022-06-28 NOTE — TELEPHONE ENCOUNTER
[phone]   Calling in regards to CMN BP Monitor papers faxed over 06/23/22, advised manager was out of office when faxed so we haven't received them just yet, but would be on the look out for paperwork.

## 2022-06-29 ENCOUNTER — TELEPHONE (OUTPATIENT)
Dept: ORTHOPEDIC SURGERY | Age: 47
End: 2022-06-29

## 2022-06-29 NOTE — TELEPHONE ENCOUNTER
I recall signing it, but whether it was faxed or not I'm not sure? If we need to refax it again then please do. Thanks. Damon Ang 177

## 2022-06-29 NOTE — TELEPHONE ENCOUNTER
Received a PA request for Ventolin  (90 Base)MCG/ACT aerosol. The Brand, Ventolin HFA, does not require a PA according to the [facility] Preferred Drug List.  [employer] is running the claim through [employer], when they should be running the claim through [employer]. [facility] no longer uses [employer] as their PBM. Please advise the pharmacy to run the claim through [employer] and it should pay. Thank you!

## 2022-07-05 ENCOUNTER — OFFICE VISIT (OUTPATIENT)
Dept: VASCULAR SURGERY | Age: 47
End: 2022-07-05

## 2022-07-05 VITALS
SYSTOLIC BLOOD PRESSURE: 138 MMHG | DIASTOLIC BLOOD PRESSURE: 88 MMHG | HEIGHT: 67 IN | BODY MASS INDEX: 39.24 KG/M2 | WEIGHT: 250 LBS

## 2022-07-05 DIAGNOSIS — Z09 POSTOPERATIVE EXAMINATION: ICD-10-CM

## 2022-07-05 DIAGNOSIS — Z99.2 ESRD (END STAGE RENAL DISEASE) ON DIALYSIS (HCC): Primary | ICD-10-CM

## 2022-07-05 DIAGNOSIS — N18.6 ESRD (END STAGE RENAL DISEASE) ON DIALYSIS (HCC): Primary | ICD-10-CM

## 2022-07-05 PROCEDURE — 99024 POSTOP FOLLOW-UP VISIT: CPT | Performed by: SURGERY

## 2022-07-05 NOTE — PROGRESS NOTES
Postop Progress Note    Subjective    Criselda Diop presents to the office for postop follow up. Patient is here today for follow-up after right brachiocephalic fistula creation in February of this year. She has not followed up since that operation. She has no complaints today    Objective    There were no vitals filed for this visit. General: alert, cooperative and no distress  Incision: healing well  Good thrill and bruit in the upper arm cephalic vein    Assessment  Doing well postoperatively. Plan  Widely patent right brachiocephalic fistula. Okay to begin cannulation.   She can follow-up with me as needed or for tunneled dialysis catheter removal.    Electronically signed by Go Lee MD on 7/5/2022 at 10:25 AM

## 2022-07-11 DIAGNOSIS — I95.3 HEMODIALYSIS-ASSOCIATED HYPOTENSION: ICD-10-CM

## 2022-07-11 RX ORDER — MIDODRINE HYDROCHLORIDE 5 MG/1
TABLET ORAL
Qty: 30 TABLET | Refills: 0 | Status: SHIPPED | OUTPATIENT
Start: 2022-07-11 | End: 2022-07-21 | Stop reason: DRUGHIGH

## 2022-07-17 DIAGNOSIS — F41.1 GENERALIZED ANXIETY DISORDER WITH PANIC ATTACKS: ICD-10-CM

## 2022-07-17 DIAGNOSIS — F41.0 GENERALIZED ANXIETY DISORDER WITH PANIC ATTACKS: ICD-10-CM

## 2022-07-18 RX ORDER — ALPRAZOLAM 2 MG/1
TABLET, EXTENDED RELEASE ORAL
Qty: 30 TABLET | OUTPATIENT
Start: 2022-07-18 | End: 2022-08-17

## 2022-07-18 NOTE — PROGRESS NOTES
12/27/2021     Lab Results   Component Value Date    LABMICR YES 02/20/2022    LDLCALC 92 02/24/2021    CREATININE 9.0 (HH) 03/07/2022     -Glucose readings (on average): fasting: high 90's-120's   -Other comments: appetite better controlled on higher dose Trulicity    COPD:  -Started Stiolto in late May 2022. She has taken this before. This med is preferred by insurance. She has had no issues of SOB/wheezing with taking it. Allergies   Allergen Reactions    Amoxicillin Hives, Itching and Other (See Comments)     Tolerates cephalosporins  Patient tolerating cefazolin (ANCEF) as of October 11, 2018      Levofloxacin Anaphylaxis    Vancomycin Anaphylaxis, Hives and Shortness Of Breath    Tape [Adhesive Tape] Other (See Comments)     Paper tape turns skin bright red. Plastic tape okay. Current Outpatient Medications on File Prior to Visit   Medication Sig Dispense Refill    furosemide (LASIX) 80 MG tablet Take 80 mg by mouth daily       rizatriptan (MAXALT) 10 MG tablet Take 1 tablet by mouth once as needed for Migraine May repeat in 2 hours if needed 9 tablet 5    atorvastatin (LIPITOR) 40 MG tablet Take 1 tablet by mouth nightly 30 tablet 11    aspirin 81 MG EC tablet Take 1 tablet by mouth daily 30 tablet 3    [employer] PEN NEEDLES 31G 31G X 8 MM MISC       Handicap Placard MISC by Does not apply route Expires 5/26/2027 1 each 0     No current facility-administered medications on file prior to visit.         Family History   Problem Relation Age of Onset    Diabetes Mother     Other Mother 79        Covid    Diabetes Father     High Blood Pressure Father     Colon Cancer Father     Diabetes Sister     Alcohol Abuse Maternal Grandfather     Diabetes Paternal Grandmother     Alcohol Abuse Paternal Grandfather     Diabetes Paternal Aunt     Diabetes Paternal Uncle        Social History     Tobacco Use    Smoking status: Every Day     Packs/day: 1.00     Types: Cigarettes    Smokeless tobacco: Never    Tobacco comments:     Quit in August 2021- started back up   Substance Use Topics    Alcohol use: No     Comment: Very Rare        Lab Results   Component Value Date    WBC 17.6 (H) 03/07/2022    HGB 11.0 (L) 03/07/2022    HCT 34.6 (L) 03/07/2022    MCV 82.8 03/07/2022     03/07/2022         Chemistry        Component Value Date/Time     (L) 03/07/2022 0619    K 4.9 03/07/2022 0619    K 4.9 02/20/2022 1328    CL 97 (L) 03/07/2022 0619    CO2 19 (L) 03/07/2022 0619    BUN 70 (H) 03/07/2022 0619    CREATININE 9.0 (HH) 03/07/2022 0619        Component Value Date/Time    CALCIUM 8.8 03/07/2022 0619    ALKPHOS 164 (H) 03/01/2022 0326    AST 21 03/01/2022 0326    ALT <5 (L) 03/01/2022 0326    BILITOT 0.4 03/01/2022 0326          Lab Results   Component Value Date    ALT <5 (L) 03/01/2022    AST 21 03/01/2022    ALKPHOS 164 (H) 03/01/2022    BILITOT 0.4 03/01/2022       Review of Systems   Constitutional:  Positive for activity change. Negative for appetite change, fatigue, fever and unexpected weight change. HENT:  Negative for congestion, rhinorrhea, sinus pressure and trouble swallowing. Respiratory:  Negative for cough, chest tightness, shortness of breath and wheezing. Cardiovascular:  Negative for chest pain, palpitations and leg swelling. Gastrointestinal:  Negative for abdominal distention, abdominal pain, blood in stool, constipation, diarrhea, nausea and vomiting. Genitourinary:  Negative for difficulty urinating, dysuria, frequency and hematuria. Musculoskeletal:  Negative for arthralgias and back pain. Skin:  Negative for rash. Neurological:  Negative for dizziness, weakness, light-headedness, numbness and headaches. Psychiatric/Behavioral:  Positive for sleep disturbance. Negative for dysphoric mood and hallucinations. The patient is nervous/anxious.          Wt Readings from Last 3 Encounters:   07/21/22 251 lb (113.9 kg)   07/05/22 250 lb (113.4 kg)   05/26/22 208 lb 6.4 oz (94.5 kg) BP Readings from Last 3 Encounters:   07/21/22 136/82   07/05/22 138/88   05/26/22 122/80       Pulse Readings from Last 3 Encounters:   07/21/22 (!) 114   05/26/22 (!) 106   03/17/22 98       /82   Pulse (!) 114   Temp 97.5 °F (36.4 °C)   Wt 251 lb (113.9 kg)   SpO2 98%   BMI 39.31 kg/m²      Physical Exam  Vitals reviewed. Constitutional:       General: She is awake. She is not in acute distress. Appearance: She is obese. She is not ill-appearing or diaphoretic. HENT:      Head: Normocephalic and atraumatic. No abrasion or masses. Hair is normal.      Right Ear: External ear normal.      Left Ear: External ear normal.      Nose: Nose normal.   Eyes:      General: Lids are normal. Gaze aligned appropriately. No scleral icterus. Right eye: No discharge. Left eye: No discharge. Extraocular Movements: Extraocular movements intact. Conjunctiva/sclera: Conjunctivae normal.   Neck:      Trachea: Phonation normal.   Cardiovascular:      Rate and Rhythm: Normal rate and regular rhythm. Pulmonary:      Effort: Pulmonary effort is normal. No respiratory distress. Breath sounds: No wheezing, rhonchi or rales. Abdominal:      General: Abdomen is flat. There is no distension. Palpations: Abdomen is soft. Musculoskeletal:         General: No deformity. Normal range of motion. Cervical back: Normal range of motion. No erythema. Right lower leg: No edema. Left lower leg: No edema. Skin:     Coloration: Skin is not cyanotic, jaundiced or pale. Findings: No abrasion, abscess, bruising, ecchymosis, erythema, signs of injury, laceration, lesion, petechiae, rash or wound. Neurological:      General: No focal deficit present. Mental Status: She is alert. Mental status is at baseline. GCS: GCS eye subscore is 4. GCS verbal subscore is 5. GCS motor subscore is 6. Cranial Nerves: No cranial nerve deficit, dysarthria or facial asymmetry. Motor: No weakness, tremor, atrophy or seizure activity. Coordination: Coordination normal.      Gait: Gait is intact. Psychiatric:         Attention and Perception: Attention and perception normal.         Mood and Affect: Mood and affect normal.         Speech: Speech normal.         Behavior: Behavior normal. Behavior is cooperative. Thought Content: Thought content normal.       Assessment/Plan:   Dee Teresa was seen today for follow-up. Diagnoses and all orders for this visit:    Generalized anxiety disorder with panic attacks  Comments: Will switch Effexor XR to Pristiq. I informed pt that Pristiq is the enantiomer of Effexor XR and studies have shown similar efficacy and better tolerability with Pristiq. Since she has had improvement as far as no panic attacks and fewer headache days, I think it's worth continuing an SNRI. We discussed that increasing Alprazolam from 2 to 3 mg may be an issue because of availability. Consider Alprazolam 2 mg ER BID. I discussed with patient the difference between acute vs preventative/maintenance medications regarding treatment of anxiety/depression/bipolar disorder. Drugs of the SSRI/SNRI class as well as anti-psychotics can have side effects such as weight gain, sexual dysfunction, insomnia, headache, abdominal discomfort, nausea, vomiting, etc. These medications are generally effective at alleviating symptoms of anxiety and/or depression, but side effects tend to occur within the first 1-2 weeks of taking the medication. Patient was informed to let me know if any significant side effects do occur.      Patient was instructed to take the medication(s) every day as directed and that this medication is not an as needed medication because the medication may take at least 2 weeks to see a difference if not at least 4-6 weeks to have a beneficial effect and that by going even 1-2 days without taking the medication one could risk of having rebound anxiety/depression. In addition, the patient was informed that this medication cannot be abruptly stopped after having had taken it for a long period of time and that the medication would have to be gradually tapered off under the guidance of a healthcare provider. The patient was informed that 4-6 weeks follow-up is generally recommended follow-up time for starting/adding a new medication or with adjusting the dose of any given medication. I have discussed with patient (at some point) and made aware that some patients may benefit from more than one medication compared to monotherapy. A combination of both medical and behavioral therapy may yield better/more effective results than either therapy alone. Lastly, it's worth mentioning that the medication(s) prescribed will not completely resolve feelings of anxiety/depression as well as make one immune or completely prevent from having more issues with anxiety and/or depression. Stress (emotional & physical) is part of everyday life. Orders:  -     ALPRAZolam (ALPRAZOLAM XR) 2 MG extended release tablet; TAKE ONE TABLET BY MOUTH EVERY MORNING  -     desvenlafaxine succinate (PRISTIQ) 25 MG TB24 extended release tablet; Take 1 tablet by mouth in the morning. Hemodialysis-associated hypotension  Comments:  Patient was advised to use Midodrine for hypotension (SBP<90, DBP<60) as directed. Orders:  -     midodrine (PROAMATINE) 10 MG tablet; TAKE ONE TABLET BY MOUTH THREE TIMES A DAY    Type 2 diabetes mellitus with diabetic polyneuropathy, with long-term current use of insulin (HCC)  Comments:  DM controlled and stable. Continue current medical regiment. Orders:  -     pregabalin (LYRICA) 75 MG capsule; Take 1 capsule by mouth in the morning for 30 days.   -     insulin lispro, 1 Unit Dial, 100 UNIT/ML SOPN; Inject 0-6 Units into the skin 3 times daily (with meals) Per sliding scale  -     Dulaglutide (TRULICITY) 3 LT/8.7YN SOPN; Inject 3 mg into the skin once a week  -     insulin glargine (LANTUS;BASAGLAR) 100 UNIT/ML injection pen; Inject 10 Units into the skin nightly  -     Renal Function Panel; Future  -     Hemoglobin A1C; Future    Chronic obstructive pulmonary disease, unspecified COPD type ([address]  Comments:  Stable on current medical regiment. Orders:  -     tiotropium-olodaterol (STIOLTO RESPIMAT) 2.5-2.5 MCG/ACT AERS; Inhale 2 puffs into the lungs in the morning.  -     albuterol sulfate HFA (PROVENTIL;VENTOLIN;PROAIR) 108 (90 Base) MCG/ACT inhaler; Inhale 2 puffs into the lungs every 6 hours as needed for Wheezing or Shortness of Breath    Chronic migraine with aura  Comments:  Patient has ESRD and is on dialysis. Therefore, she is unable to use other meds. Gave hard copy of [place] for migraine prophylaxis. Orders:  -     Atogepant (QULIPTA) 10 MG TABS; Take 10 mg by mouth daily  -     desvenlafaxine succinate (PRISTIQ) 25 MG TB24 extended release tablet; Take 1 tablet by mouth in the morning. Other: she has had significant weight gain over the past year. I think her weight may be inaccurate because of issues having her to get up from her wheelchair and standing on the scale. I reviewed the plan of care with the patient. Patient acknowledged understanding and agreed with plan of care overall.     Medications Discontinued During This Encounter   Medication Reason    venlafaxine (EFFEXOR XR) 37.5 MG extended release capsule Alternate therapy    albuterol sulfate  (90 Base) MCG/ACT inhaler REORDER    insulin glargine (LANTUS;BASAGLAR) 100 UNIT/ML injection pen REORDER    tiotropium-olodaterol (STIOLTO RESPIMAT) 2.5-2.5 MCG/ACT AERS REORDER    pregabalin (LYRICA) 75 MG capsule REORDER    insulin lispro, 1 Unit Dial, 100 UNIT/ML SOPN REORDER    Atogepant (QULIPTA) 10 MG TABS REORDER    Dulaglutide (TRULICITY) 3 [place]/9.0AF SOPN REORDER    ALPRAZOLAM XR 2 MG extended release tablet REORDER    midodrine (PROAMATINE) 5 MG tablet DOSE ADJUSTMENT General information on medications:  -When it comes to medications, whether with starting or adding a new medication or increasing the dose of a current medication, the benefits and risks have to always be considered and weighed over, especially if one is taking other medications as well. -There are no medications that have no side effects and that there is always a risk involved with taking a medication.    -If a side effect were to occur with starting a new medication or with increasing the dose of a current medication that either the medication can be totally discontinued altogether or simply decrease the dose of it and if this would be the case a follow-up appointment would be deemed necessary.    -The drug allergy list will then be updated with the corresponding side effect(s) if it's deemed to be a true 'drug allergy'. -The most common adverse effects of medication(s) were addressed at today's visit.    -Lastly, the coverage status of a medication may vary from insurance to insurance and the only way to verify if the medication is covered is to send an actual prescription in.    -The drug formulary of each insurance changes without any warning or notification to the healthcare provider let alone the pharmacy.  -The cost of medications vary from insurance to insurance and the cost is always subject to change just like the drug formulary. Follow-up: Return in about 8 weeks (around 9/15/2022) for A1c check - diabetes. .     Patient was informed that if his or her symptoms worsen to follow up with me sooner or go to the nearest ER if the symptoms are very significant and warrant higher level of care.     Regarding my note:  -This note was composed (by me only and not with assistance via a scribe) to the best of my knowledge and recollection of the encounter with the patient using one of my own customized note templates utilizing a combination of typing and dictating with the [employer] speech recognition software. As a result, the note may possibly contain various errors (e.g. spelling, grammar, and non-sensible words/phrases/statements) despite reviewing the note prior to signing it for completion. Time spent includes some or all of the following, both face-to-face time and non face-to-face time, but is not limited to:  [x] Preparing to see the patient by reviewing medical records available (notes, labs, imaging, etc.) prior to seeing the patient. [x] Obtaining and/or reviewing the history from the patient. [x] Performing a medically appropriate examination. [x] Ordering of relevant lab work, medications, referrals, or procedures. [x] Discussing patient's medical issues and formulating an assessment and plan. [x] Reviewing plan of care with patient. Answering any questions or concerns. [x] Documentation within the electronic health record (EHR)  [] Reviewing records of history relevant to patient's issues after seeing the patient. [] Discussion or coordination of care with other health care professionals  [x] Other: length office visit - 45 minutes.  -I spent a significant amount of time discussing various issues as noted above and also with formulating a treatment plan for each specific issue. Patient was given the opportunity to ask me any questions and address any concerns/issues. I also reviewed lab work (if available) as well as prior notes from PCP and/or other specialists if available. Damon Brink M.D.   [address] [place] [address]    Electronically signed by Shonna Rossi MD M.D. on 7/21/2022 at 8:50 PM.

## 2022-07-21 ENCOUNTER — OFFICE VISIT (OUTPATIENT)
Dept: FAMILY MEDICINE CLINIC | Age: 47
End: 2022-07-21
Payer: COMMERCIAL

## 2022-07-21 VITALS
OXYGEN SATURATION: 98 % | HEART RATE: 114 BPM | BODY MASS INDEX: 39.31 KG/M2 | WEIGHT: 251 LBS | DIASTOLIC BLOOD PRESSURE: 82 MMHG | TEMPERATURE: 97.5 F | SYSTOLIC BLOOD PRESSURE: 136 MMHG

## 2022-07-21 DIAGNOSIS — J44.9 CHRONIC OBSTRUCTIVE PULMONARY DISEASE, UNSPECIFIED COPD TYPE (HCC): ICD-10-CM

## 2022-07-21 DIAGNOSIS — G43.109 CHRONIC MIGRAINE WITH AURA: ICD-10-CM

## 2022-07-21 DIAGNOSIS — Z79.4 TYPE 2 DIABETES MELLITUS WITH DIABETIC POLYNEUROPATHY, WITH LONG-TERM CURRENT USE OF INSULIN (HCC): ICD-10-CM

## 2022-07-21 DIAGNOSIS — F41.0 GENERALIZED ANXIETY DISORDER WITH PANIC ATTACKS: Primary | ICD-10-CM

## 2022-07-21 DIAGNOSIS — F41.1 GENERALIZED ANXIETY DISORDER WITH PANIC ATTACKS: Primary | ICD-10-CM

## 2022-07-21 DIAGNOSIS — I95.3 HEMODIALYSIS-ASSOCIATED HYPOTENSION: ICD-10-CM

## 2022-07-21 DIAGNOSIS — E11.42 TYPE 2 DIABETES MELLITUS WITH DIABETIC POLYNEUROPATHY, WITH LONG-TERM CURRENT USE OF INSULIN (HCC): ICD-10-CM

## 2022-07-21 PROCEDURE — 3044F HG A1C LEVEL LT 7.0%: CPT | Performed by: FAMILY MEDICINE

## 2022-07-21 PROCEDURE — 99214 OFFICE O/P EST MOD 30 MIN: CPT | Performed by: FAMILY MEDICINE

## 2022-07-21 RX ORDER — ALBUTEROL SULFATE 90 UG/1
2 AEROSOL, METERED RESPIRATORY (INHALATION) EVERY 6 HOURS PRN
Qty: 18 G | Refills: 1 | Status: SHIPPED | OUTPATIENT
Start: 2022-07-21 | End: 2022-09-15 | Stop reason: SDUPTHER

## 2022-07-21 RX ORDER — ALPRAZOLAM 2 MG/1
TABLET, EXTENDED RELEASE ORAL
Qty: 30 TABLET | Refills: 0 | Status: SHIPPED | OUTPATIENT
Start: 2022-07-21 | End: 2022-08-18

## 2022-07-21 RX ORDER — ATOGEPANT 10 MG/1
10 TABLET ORAL DAILY
Qty: 30 TABLET | Refills: 5 | Status: SHIPPED | OUTPATIENT
Start: 2022-07-21

## 2022-07-21 RX ORDER — VENLAFAXINE HYDROCHLORIDE 37.5 MG/1
37.5 CAPSULE, EXTENDED RELEASE ORAL DAILY
Qty: 30 CAPSULE | Refills: 1 | Status: CANCELLED | OUTPATIENT
Start: 2022-07-21

## 2022-07-21 RX ORDER — MIDODRINE HYDROCHLORIDE 10 MG/1
TABLET ORAL
Qty: 90 TABLET | Refills: 5 | Status: SHIPPED | OUTPATIENT
Start: 2022-07-21

## 2022-07-21 RX ORDER — INSULIN LISPRO 100 [IU]/ML
0-6 INJECTION, SOLUTION INTRAVENOUS; SUBCUTANEOUS
Qty: 3 PEN | Refills: 0 | Status: SHIPPED | OUTPATIENT
Start: 2022-07-21 | End: 2022-09-15 | Stop reason: SDUPTHER

## 2022-07-21 RX ORDER — DULAGLUTIDE 3 MG/.5ML
3 INJECTION, SOLUTION SUBCUTANEOUS WEEKLY
Qty: 4 PEN | Refills: 2 | Status: SHIPPED | OUTPATIENT
Start: 2022-07-21 | End: 2022-09-15 | Stop reason: SDUPTHER

## 2022-07-21 RX ORDER — PEN NEEDLE, DIABETIC 31 GX5/16"
NEEDLE, DISPOSABLE MISCELLANEOUS
COMMUNITY
Start: 2022-06-12

## 2022-07-21 RX ORDER — PREGABALIN 75 MG/1
75 CAPSULE ORAL DAILY
Qty: 30 CAPSULE | Refills: 2 | Status: SHIPPED | OUTPATIENT
Start: 2022-07-21 | End: 2022-09-15 | Stop reason: SDUPTHER

## 2022-07-21 RX ORDER — DESVENLAFAXINE 25 MG/1
25 TABLET, EXTENDED RELEASE ORAL DAILY
Qty: 30 TABLET | Refills: 1 | Status: SHIPPED | OUTPATIENT
Start: 2022-07-21 | End: 2022-09-15 | Stop reason: SDUPTHER

## 2022-07-21 ASSESSMENT — ENCOUNTER SYMPTOMS
VOMITING: 0
NAUSEA: 0
BLOOD IN STOOL: 0
WHEEZING: 0
SHORTNESS OF BREATH: 0
ABDOMINAL DISTENTION: 0
SINUS PRESSURE: 0
COUGH: 0
DIARRHEA: 0
RHINORRHEA: 0
TROUBLE SWALLOWING: 0
BACK PAIN: 0
CHEST TIGHTNESS: 0
ABDOMINAL PAIN: 0
CONSTIPATION: 0

## 2022-08-03 NOTE — FLOWSHEET NOTE
Physical Therapy  Cancellation/No-show Note  Patient Name:  Janee Duke  :  1975   Date:  2022  Cancelled visits to date: 0  No-shows to date: 1    Patient status for today's appointment patient:  []  Cancelled    []  Rescheduled appointment  [x]  No-show      Reason given by patient:  []  Patient ill  []  Conflicting appointment  []  No transportation    []  Conflict with work  []  No reason given  []  Other:     Comments:      Phone call information:   []  Phone call made today to patient at _ time at number provided:      []  Patient answered, conversation as follows:    []  Patient did not answer, message left as follows:  [x]  Phone call not made today  []  Phone call not needed - pt contacted us to cancel and provided reason for cancellation.      Electronically signed by:  Francesca Souza PT #1:

## 2022-08-08 RX ORDER — VENLAFAXINE HYDROCHLORIDE 37.5 MG/1
CAPSULE, EXTENDED RELEASE ORAL
Qty: 30 CAPSULE | Refills: 1 | OUTPATIENT
Start: 2022-08-08

## 2022-08-18 DIAGNOSIS — F41.0 GENERALIZED ANXIETY DISORDER WITH PANIC ATTACKS: ICD-10-CM

## 2022-08-18 DIAGNOSIS — F41.1 GENERALIZED ANXIETY DISORDER WITH PANIC ATTACKS: ICD-10-CM

## 2022-08-18 RX ORDER — ALPRAZOLAM 2 MG/1
TABLET, EXTENDED RELEASE ORAL
Qty: 30 TABLET | Refills: 0 | Status: SHIPPED | OUTPATIENT
Start: 2022-08-18 | End: 2022-09-15 | Stop reason: SDUPTHER

## 2022-08-25 ENCOUNTER — OFFICE VISIT (OUTPATIENT)
Dept: PULMONOLOGY | Age: 47
End: 2022-08-25
Payer: COMMERCIAL

## 2022-08-25 VITALS
OXYGEN SATURATION: 98 % | BODY MASS INDEX: 31.08 KG/M2 | SYSTOLIC BLOOD PRESSURE: 124 MMHG | HEIGHT: 67 IN | HEART RATE: 100 BPM | DIASTOLIC BLOOD PRESSURE: 70 MMHG | WEIGHT: 198 LBS

## 2022-08-25 DIAGNOSIS — E66.01 CLASS 2 SEVERE OBESITY DUE TO EXCESS CALORIES WITH SERIOUS COMORBIDITY AND BODY MASS INDEX (BMI) OF 39.0 TO 39.9 IN ADULT (HCC): ICD-10-CM

## 2022-08-25 DIAGNOSIS — G47.33 OSA (OBSTRUCTIVE SLEEP APNEA): Primary | ICD-10-CM

## 2022-08-25 DIAGNOSIS — J43.2 CENTRILOBULAR EMPHYSEMA (HCC): ICD-10-CM

## 2022-08-25 DIAGNOSIS — I10 HTN (HYPERTENSION), BENIGN: ICD-10-CM

## 2022-08-25 PROCEDURE — 3023F SPIROM DOC REV: CPT | Performed by: INTERNAL MEDICINE

## 2022-08-25 PROCEDURE — 99407 BEHAV CHNG SMOKING > 10 MIN: CPT | Performed by: INTERNAL MEDICINE

## 2022-08-25 PROCEDURE — G8427 DOCREV CUR MEDS BY ELIG CLIN: HCPCS | Performed by: INTERNAL MEDICINE

## 2022-08-25 PROCEDURE — 99204 OFFICE O/P NEW MOD 45 MIN: CPT | Performed by: INTERNAL MEDICINE

## 2022-08-25 PROCEDURE — G8417 CALC BMI ABV UP PARAM F/U: HCPCS | Performed by: INTERNAL MEDICINE

## 2022-08-25 PROCEDURE — 4004F PT TOBACCO SCREEN RCVD TLK: CPT | Performed by: INTERNAL MEDICINE

## 2022-08-25 ASSESSMENT — SLEEP AND FATIGUE QUESTIONNAIRES
ESS TOTAL SCORE: 3
HOW LIKELY ARE YOU TO NOD OFF OR FALL ASLEEP WHILE LYING DOWN TO REST IN THE AFTERNOON WHEN CIRCUMSTANCES PERMIT: 3
HOW LIKELY ARE YOU TO NOD OFF OR FALL ASLEEP IN A CAR, WHILE STOPPED FOR A FEW MINUTES IN TRAFFIC: 0
HOW LIKELY ARE YOU TO NOD OFF OR FALL ASLEEP WHILE SITTING INACTIVE IN A PUBLIC PLACE: 0
HOW LIKELY ARE YOU TO NOD OFF OR FALL ASLEEP WHILE WATCHING TV: 0
HOW LIKELY ARE YOU TO NOD OFF OR FALL ASLEEP WHILE SITTING AND TALKING TO SOMEONE: 0
HOW LIKELY ARE YOU TO NOD OFF OR FALL ASLEEP WHEN YOU ARE A PASSENGER IN A CAR FOR AN HOUR WITHOUT A BREAK: 0
HOW LIKELY ARE YOU TO NOD OFF OR FALL ASLEEP WHILE SITTING AND READING: 0
HOW LIKELY ARE YOU TO NOD OFF OR FALL ASLEEP WHILE SITTING QUIETLY AFTER LUNCH WITHOUT ALCOHOL: 0

## 2022-08-25 NOTE — PROGRESS NOTES
PULMONARY OFFICE NEW PATIENT VISIT    CONSULTING PHYSICIAN:  Mar Summers MD , Malinda Mayo MD     REASON FOR VISIT:   Chief Complaint   Patient presents with    New Patient     Ref: Dr. Diantha Paget, nocturnal hypoxia     Snoring    Daytime Sleepiness       DATE OF VISIT: 8/25/2022    HISTORY OF PRESENT ILLNESS: 52y.o. year old female comes into the pulm/sleep clinic for the first time. Patient reported for last several years she has been having poor quality sleep. Recently when she was hospitalized she was seen to have oxygen desaturation while sleeping. She does not does have occasional gasping and choking. She is legally blind and has difficulty discerning between day and night. This leads to disturbed sleeping pattern. After waking up she does not feel refreshed. Weight has been stable. Continues to smoke 1 pack of cigarettes daily. She has been smoking for last 33 years. Has been clinically diagnosed with COPD. Currently taking Stiolto Respimat and albuterol. Breathing remained stable. Sleep Medicine 8/25/2022   Sitting and reading 0   Watching TV 0   Sitting, inactive in a public place (e.g. a theatre or a meeting) 0   As a passenger in a car for an hour without a break 0   Lying down to rest in the afternoon when circumstances permit 3   Sitting and talking to someone 0   Sitting quietly after a lunch without alcohol 0   In a car, while stopped for a few minutes in traffic 0   Total score 3       STOP-BANG Questionnaire    Snore Loudly Yes   Often feel Tired/Fatigued/Sleepy Yes   Observed breathing pauses Yes   Blood pressure problems Yes   BMI >35 Kg/m2 Body mass index is 31.01 kg/m². Age>50 52 y.o. Neck Circumference>16 inches      Gender Male? female     Total 4       REVIEW OF SYSTEMS:   CONSTITUTIONAL SYMPTOMS: The patient denies fever, fatigue, night sweats, weight loss or weight gain. HEENT: No vision changes. No tinnitus, Denies sinus pain.  No hoarseness, or dysphagia. NECK: Patient denies swelling in the neck. CARDIOVASCULAR: Denies chest pain, palpitation, syncope. RESPIRATORY: Denies shortness of breath or cough. GASTROINTESTINAL: Denies nausea, abdominal pain or change in bowel function. GENITOURINARY: Denies obstructive symptoms. No history of incontinence. BREASTS: No masses or lumps in the breasts. SKIN: No rashes or itching. MUSCULOSKELETAL: Denies weakness or bone pain. NEUROLOGICAL: No headaches or seizures. PSYCHIATRIC: Denies mood swings or depression. ENDOCRINE: Denies heat or cold intolerance or excessive thirst.  HEMATOLOGIC/LYMPHATIC: Denies easy bruising or lymph node swelling. ALLERGIC/IMMUNOLOGIC: No environmental allergies. PAST MEDICAL HISTORY:   Past Medical History:   Diagnosis Date    Blind in both eyes     Cerebral artery occlusion with cerebral infarction ([facility]     CHF (congestive heart failure) (HCC)     Chronic kidney disease     COPD (chronic obstructive pulmonary disease) ([facility]     Depression     Diabetes mellitus out of control ([facility]     Diabetes mellitus, type II ([facility]     2005    Diabetic neuropathy associated with type 2 diabetes mellitus ([facility]     Generalized headaches     Hypertension     Infertility     Insomnia     chronic vs lack of time spent to sleep    Migraine headache 11/09/2011    Mixed hyperlipidemia     Otitis media     h/o recurrent    Pelvic abscess in female 10/05/2013    Pneumonia     2004 approx.     Stroke ([facility] 08/27/2020    Stroke ([facility] 08/27/2021       PAST SURGICAL HISTORY:   Past Surgical History:   Procedure Laterality Date    CERVIX SURGERY      laser tx for dysplasia;1992    DIALYSIS FISTULA CREATION Right 2/10/2022    RIGHT BRACHIO CEPHALIC FISTULA CREATION performed by Bessie Wang MD at [address] [place] [address] Ave Right     FOOT SURGERY Bilateral     FOOT SURGERY Left     IR TUNNELED CATHETER PLACEMENT GREATER THAN 5 YEARS  9/7/2021    IR TUNNELED CATHETER PLACEMENT GREATER THAN 5 YEARS 9/7/2021 Marcie Calderon MD [facility]       SOCIAL HISTORY:   Social History     Tobacco Use    Smoking status: Every Day     Packs/day: 1.00     Types: Cigarettes    Smokeless tobacco: Never    Tobacco comments:     Quit in August 2021- started back up   Vaping Use    Vaping Use: Never used   Substance Use Topics    Alcohol use: No     Comment: Very Rare    Drug use: No       FAMILY HISTORY:   Family History   Problem Relation Age of Onset    Diabetes Mother     Other Mother 79        Covid    Diabetes Father     High Blood Pressure Father     Colon Cancer Father     Diabetes Sister     Alcohol Abuse Maternal Grandfather     Diabetes Paternal Grandmother     Alcohol Abuse Paternal Grandfather     Diabetes Paternal Aunt     Diabetes Paternal Uncle        MEDICATIONS:     Current Outpatient Medications on File Prior to Visit   Medication Sig Dispense Refill    ALPRAZolam (ALPRAZOLAM XR) 2 MG extended release tablet TAKE ONE TABLET BY MOUTH EVERY MORNING 30 tablet 0    [employer] PEN NEEDLES 31G 31G X 8 MM MISC       midodrine (PROAMATINE) 10 MG tablet TAKE ONE TABLET BY MOUTH THREE TIMES A DAY 90 tablet 5    tiotropium-olodaterol (STIOLTO RESPIMAT) 2.5-2.5 MCG/ACT AERS Inhale 2 puffs into the lungs in the morning. 4 g 5    pregabalin (LYRICA) 75 MG capsule Take 1 capsule by mouth in the morning for 30 days.  30 capsule 2    insulin lispro, 1 Unit Dial, 100 UNIT/ML SOPN Inject 0-6 Units into the skin 3 times daily (with meals) Per sliding scale 3 pen 0    Dulaglutide (TRULICITY) 3 PC/3.5IC SOPN Inject 3 mg into the skin once a week 4 pen 2    insulin glargine (LANTUS;BASAGLAR) 100 UNIT/ML injection pen Inject 10 Units into the skin nightly 5 pen 3    albuterol sulfate HFA (PROVENTIL;VENTOLIN;PROAIR) 108 (90 Base) MCG/ACT inhaler Inhale 2 puffs into the lungs every 6 hours as needed for Wheezing or Shortness of Breath 18 g 1    Atogepant (QULIPTA) 10 MG TABS Take 10 mg by mouth daily 30 tablet 5    desvenlafaxine succinate (PRISTIQ) 25 MG TB24 extended release tablet Take 1 tablet by mouth in the morning. 30 tablet 1    furosemide (LASIX) 80 MG tablet Take 80 mg by mouth daily       rizatriptan (MAXALT) 10 MG tablet Take 1 tablet by mouth once as needed for Migraine May repeat in 2 hours if needed 9 tablet 5    Handicap Placard MISC by Does not apply route Expires 5/26/2027 1 each 0    atorvastatin (LIPITOR) 40 MG tablet Take 1 tablet by mouth nightly 30 tablet 11    aspirin 81 MG EC tablet Take 1 tablet by mouth daily 30 tablet 3     No current facility-administered medications on file prior to visit. ALLERGIES:   Allergies as of 08/25/2022 - Fully Reviewed 08/25/2022   Allergen Reaction Noted    Amoxicillin Hives, Itching, and Other (See Comments) 07/16/2018    Levofloxacin Anaphylaxis 08/08/2011    Vancomycin Anaphylaxis, Hives, and Shortness Of Breath 04/21/2019    Tape [adhesive tape] Other (See Comments) 04/25/2020      OBJECTIVE:   height is 5' 7\" (1.702 m) and weight is 198 lb (89.8 kg). Her blood pressure is 124/70 and her pulse is 100. Her oxygen saturation is 98%. PHYSICAL EXAM:    CONSTITUTIONAL: She is a 52y.o.-year-old who appears her stated age. She is alert and oriented x 3 and in no acute distress. HEENT: PERRLA, EOMI. No scleral icterus. No thrush, atraumatic, normocephalic. Mallampati class 3 airway. NECK: Supple, without cervical or supraclavicular lymphadenopathy:  CARDIOVASCULAR: S1 S2 RRR. Without murmer  RESPIRATORY & CHEST: Lungs are clear to auscultation and percussion. No wheezing, no crackles. Good air movement  GASTROINTESTINAL & ABDOMEN: Soft, nontender, positive bowel sounds in all quadrants, non-distended, without hepatosplenomegaly. GENITOURINARY: Deferred. MUSCULOSKELETAL: No tenderness to palpation of the axial skeleton. There is no clubbing. No cyanosis. No edema of the lower extremities. SKIN OF BODY: No rash or jaundice. PSYCHIATRIC EVALUATION: Normal affect. Patient answers questions appropriately. HEMATOLOGIC/LYMPHATIC/ IMMUNOLOGIC: No palpable lymphadenopathy. NEUROLOGIC: Alert and oriented x 3. Groslly non-focal. Motor strength is 5+/5 in all muscle groups. The patient has a normal sensorium globally. LABS:    Lab Summary Latest Ref Rng & Units 3/7/2022 3/7/2022 3/4/2022   WBC 4.0 - 11.0 K/uL 17. 6(H) 16. 7(H) 11. 6(H)   Hgb 12.0 - 16.0 g/dL 11. 0(L) 10. 1(L) 11. 5(L)   Hct 36.0 - 48.0 % 34. 6(L) 31. 6(L) 36.6   Platelets 199 - 908 K/uL 286 280 316   Sodium 136 - 145 mmol/L - 134(L) 137   Potassium 3.5 - 5.1 mmol/L - 4.9 4.4   BUN 7 - 20 mg/dL - 70(H) 41(H)   Creatinine 0.6 - 1.1 mg/dL - 9. 0(HH) 6. 6(HH)   Glucose 70 - 99 mg/dL - 188(H) 97   Calcium 8.3 - 10.6 mg/dL - 8.8 9.5   Phosphorus 2.5 - 4.9 mg/dL - 6. 4(H) 5. 9(H)   Alk Phos 40 - 129 U/L - - -   Albumin 3.4 - 5.0 g/dL - 3.6 3.7   Bilirubin 0.0 - 1.0 mg/dL - - -   AST 15 - 37 U/L - - -   ALT 10 - 40 U/L - - -   Some recent data might be hidden         IMAGING:    No new chest imaging for me to review. Pulmonary Functions Testing Results:            IMPRESSION AND RECOMMENDATIONS:     1. GABRIELA (obstructive sleep apnea)  -Patient has several features which are suspicious for obstructive sleep apnea. -I will investigate this further with a split-night sleep study.  -Patient has diastolic heart failure, COPD which makes her a poor candidate for home sleep testing.  - Sleep Study with PAP Titration; Future    2. Class 2 severe obesity due to excess calories with serious comorbidity and body mass index (BMI) of 39.0 to 39.9 in adult [facility])  -I strongly advised the patient to make efforts to lose weight. I discussed various modalities including moderate intensity intermittent exercises, diet control and bariatric surgery. If the patient loses even 10 to 15% of current body weight, it will be beneficial in improving the overall health.      3. HTN (hypertension), benign  -Currently on Lasix 80 mg p.o. daily.  -Untreated sleep apnea can lead to refractory hypertension. 4. Centrilobular emphysema (HCC)  -Chronic, stable. -Patient has a longstanding history of smoking.  -I will get a complete PFT to assess her current lung capacity.  -She will continue to use Stiolto Respimat and albuterol as needed. - Full PFT Study With Bronchodilator; Future    5. Nicotine dependence  -Patient counselled on the dangers of tobacco use and urged to quit. Also discussed the importance of a supportive environment and helped identify them. Discussed possibility of various Nicotine replacement therapies if experiencing prolonged craving or withdrawal symptoms. Discussed the possibility of negative mood or depression after quitting. Reassured that some weight gain after smoking is common and dietary, exercise, or lifestyle changes will be able to control it. Time spent counseling was >10mins. Return in about 6 weeks (around 10/6/2022) for emmanuel, COPD. Mishel Cristina MD  Pulmonary Critical Care and Sleep Medicine  8/25/2022, 2:09 PM    This note was completed using dragon medical speech recognition software. Grammatical errors, random word insertions, pronoun errors and incomplete sentences are occasional consequences of this technology due to software limitations. If there are questions or concerns about the content of this note of information contained within the body of this dictation they should be addressed with the provider for clarification.

## 2022-09-02 ENCOUNTER — APPOINTMENT (OUTPATIENT)
Dept: GENERAL RADIOLOGY | Age: 47
DRG: 314 | End: 2022-09-02
Payer: MEDICARE

## 2022-09-02 ENCOUNTER — HOSPITAL ENCOUNTER (EMERGENCY)
Age: 47
Discharge: HOME | DRG: 314 | End: 2022-09-02
Payer: MEDICARE

## 2022-09-02 ENCOUNTER — TELEPHONE (OUTPATIENT)
Dept: VASCULAR SURGERY | Age: 47
End: 2022-09-02

## 2022-09-02 VITALS
RESPIRATION RATE: 18 BRPM | SYSTOLIC BLOOD PRESSURE: 164 MMHG | DIASTOLIC BLOOD PRESSURE: 83 MMHG | TEMPERATURE: 99.8 F | HEIGHT: 67 IN | HEART RATE: 103 BPM | BODY MASS INDEX: 32.49 KG/M2 | OXYGEN SATURATION: 97 % | WEIGHT: 207 LBS

## 2022-09-02 DIAGNOSIS — T82.9XXA DIALYSIS COMPLICATION, INITIAL ENCOUNTER: Primary | ICD-10-CM

## 2022-09-02 LAB
A/G RATIO: 1.2 (ref 1.1–2.2)
ALBUMIN SERPL-MCNC: 3.8 G/DL (ref 3.4–5)
ALP BLD-CCNC: 173 U/L (ref 40–129)
ALT SERPL-CCNC: <5 U/L (ref 10–40)
ANION GAP SERPL CALCULATED.3IONS-SCNC: 14 MMOL/L (ref 3–16)
AST SERPL-CCNC: 18 U/L (ref 15–37)
BASOPHILS ABSOLUTE: 0.1 K/UL (ref 0–0.2)
BASOPHILS RELATIVE PERCENT: 0.7 %
BILIRUB SERPL-MCNC: 0.4 MG/DL (ref 0–1)
BUN BLDV-MCNC: 51 MG/DL (ref 7–20)
CALCIUM SERPL-MCNC: 9.2 MG/DL (ref 8.3–10.6)
CHLORIDE BLD-SCNC: 95 MMOL/L (ref 99–110)
CO2: 27 MMOL/L (ref 21–32)
CREAT SERPL-MCNC: 7 MG/DL (ref 0.6–1.1)
EKG ATRIAL RATE: 97 BPM
EKG DIAGNOSIS: NORMAL
EKG P AXIS: 19 DEGREES
EKG P-R INTERVAL: 142 MS
EKG Q-T INTERVAL: 362 MS
EKG QRS DURATION: 90 MS
EKG QTC CALCULATION (BAZETT): 459 MS
EKG R AXIS: 9 DEGREES
EKG T AXIS: 80 DEGREES
EKG VENTRICULAR RATE: 97 BPM
EOSINOPHILS ABSOLUTE: 0.7 K/UL (ref 0–0.6)
EOSINOPHILS RELATIVE PERCENT: 5.4 %
GFR AFRICAN AMERICAN: 8
GFR NON-AFRICAN AMERICAN: 6
GLUCOSE BLD-MCNC: 179 MG/DL (ref 70–99)
HCT VFR BLD CALC: 28.2 % (ref 36–48)
HEMOGLOBIN: 9.5 G/DL (ref 12–16)
LYMPHOCYTES ABSOLUTE: 2.6 K/UL (ref 1–5.1)
LYMPHOCYTES RELATIVE PERCENT: 20.7 %
MCH RBC QN AUTO: 31.2 PG (ref 26–34)
MCHC RBC AUTO-ENTMCNC: 33.8 G/DL (ref 31–36)
MCV RBC AUTO: 92.5 FL (ref 80–100)
MONOCYTES ABSOLUTE: 0.7 K/UL (ref 0–1.3)
MONOCYTES RELATIVE PERCENT: 5.4 %
NEUTROPHILS ABSOLUTE: 8.6 K/UL (ref 1.7–7.7)
NEUTROPHILS RELATIVE PERCENT: 67.8 %
PDW BLD-RTO: 17 % (ref 12.4–15.4)
PLATELET # BLD: 216 K/UL (ref 135–450)
PMV BLD AUTO: 8.4 FL (ref 5–10.5)
POTASSIUM SERPL-SCNC: 3.9 MMOL/L (ref 3.5–5.1)
RBC # BLD: 3.05 M/UL (ref 4–5.2)
SODIUM BLD-SCNC: 136 MMOL/L (ref 136–145)
TOTAL PROTEIN: 7 G/DL (ref 6.4–8.2)
WBC # BLD: 12.7 K/UL (ref 4–11)

## 2022-09-02 PROCEDURE — 71045 X-RAY EXAM CHEST 1 VIEW: CPT

## 2022-09-02 PROCEDURE — 85025 COMPLETE CBC W/AUTO DIFF WBC: CPT

## 2022-09-02 PROCEDURE — 93005 ELECTROCARDIOGRAM TRACING: CPT | Performed by: PHYSICIAN ASSISTANT

## 2022-09-02 PROCEDURE — 99285 EMERGENCY DEPT VISIT HI MDM: CPT

## 2022-09-02 PROCEDURE — 80053 COMPREHEN METABOLIC PANEL: CPT

## 2022-09-02 PROCEDURE — 93010 ELECTROCARDIOGRAM REPORT: CPT | Performed by: INTERNAL MEDICINE

## 2022-09-02 ASSESSMENT — PAIN - FUNCTIONAL ASSESSMENT: PAIN_FUNCTIONAL_ASSESSMENT: NONE - DENIES PAIN

## 2022-09-02 NOTE — DISCHARGE INSTRUCTIONS
-1L per day fluid limit.  -No high potassium foods.   -Go to dialysis on Monday. Have them call the nephrologist if there's a problem.   -Come back if you feel worse.

## 2022-09-02 NOTE — ED PROVIDER NOTES
KANDY. I have evaluated this patient. My supervising physician was available for consultation. Chief Complaint:   Chief Complaint   Patient presents with    Vascular Access Problem     Dialysis port causing problems today at HD. No dialysis done today. ED Course & Medical Decision Making  52 y.o. female presenting with difficulty with dialysis port. -CBC/CHEM: Creat 7, hemoglobin 9.5: Potassium 3.9    -Chest x-ray: Lungs clear  -EKG: Normal sinus    MDM: This 31-year-old female was unable to dialyze today due to access issues. I discussed the situation with Dr. Anupama Roldan, her nephrologist.  He recommended that she be discharged, and that she should follow-up on Monday for dialysis. If they are again unable to access the port, they should call him, and if they are still unable to resolve the problem, she will present back to the emergency department. Dr. Anupama Roldan also recommended that she restrict fluids to less than 1 L, and avoid high potassium foods. I discussed the plan with the patient, and she is willing to give it a shot, knows to return to the emergency department should she experience worsening or changing symptoms. Final Clinical Impression & Plan:  Dialysis catheter access problem, missed dialysis    - f/u with dialysis center on Monday  -  ED return precautions given and reviewed, questions answered. ---------------------------------------------------------------------------------------------------------------  HPI:  PMH significant for ESRD, on hemodialysis, diabetes, CHF    Presenting with difficulty with dialysis. Patient went to dialysis this morning, was unable to get dialyzed due to access problems. She has a port and a shunt. They attempted to access both unsuccessfully. Patient came to the emergency department for dialysis. She denies fevers, chills, nausea, vomiting. She reports being otherwise normal state of health. No headache or sore throat.   No belly pain or back pain. Is this patient to be included in the SEP-1 Core Measure due to severe sepsis or septic shock? No   Exclusion criteria - the patient is NOT to be included for SEP-1 Core Measure due to: Infection is not suspected      Medical Hx: Past medical history reviewed, and pertinent for:     Past Medical History:   Diagnosis Date    Blind in both eyes     Cerebral artery occlusion with cerebral infarction ([address]     CHF (congestive heart failure) ([address]     Chronic kidney disease     COPD (chronic obstructive pulmonary disease) ([address]     Depression     Diabetes mellitus out of control ([address]     Diabetes mellitus, type II ([address]     2005    Diabetic neuropathy associated with type 2 diabetes mellitus ([address]     Generalized headaches     Hypertension     Infertility     Insomnia     chronic vs lack of time spent to sleep    Migraine headache 11/09/2011    Mixed hyperlipidemia     Otitis media     h/o recurrent    Pelvic abscess in female 10/05/2013    Pneumonia     2004 approx.     Stroke ([address] 08/27/2020    Stroke ([facility] 08/27/2021       Patient Active Problem List   Diagnosis    Type 2 diabetes mellitus, with long-term current use of insulin ([address]    Mixed hyperlipidemia    Migraine headache    Anemia    Diabetic foot infection ([address]    Pyogenic inflammation of bone (HCC)    History of medication noncompliance    Osteomyelitis of left foot (HCC)    Nephrotic syndrome    Peripheral edema    Pulmonary edema    Right sided numbness    Tobacco dependence    Chronic kidney disease (CKD), stage III (moderate) (HCC)    Chronic diastolic (congestive) heart failure (HCC)    H/O: CVA (cerebrovascular accident)    Arterial ischemic stroke, ICA, left, acute (HCC)    HTN (hypertension), benign    DM (diabetes mellitus), secondary, uncontrolled, w/neurologic complic (HCC)    Dyslipidemia    Smoker    Panic disorder    Isolated proteinuria    Acute on chronic diastolic heart failure ([address]    Diabetic peripheral neuropathy ([address] Acute respiratory failure (HCC)    Depression    Abnormal gait    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, associated with type 2 diabetes mellitus (HCC)    Cellulitis of right foot    Hidradenitis suppurativa    Hypocalcemia    Non-toxic multinodular goiter    Polyneuropathy due to type 2 diabetes mellitus (HCC)    Proliferative diabetic retinopathy associated with type 2 diabetes mellitus (HCC)    End-stage renal disease on hemodialysis ([address]    Acute respiratory failure with hypoxemia (HCC)    Acute respiratory failure due to COVID-19 ([address]    Suspected COVID-19 virus infection    Epiglottitis    Recurrent falls    Altered mental status    Hypotension    Benzodiazepine withdrawal with delirium ([address]    Leukocytosis         Surgical Hx:   Past surgical history reviewed, and pertinent for:      Past Surgical History:   Procedure Laterality Date    CERVIX SURGERY      laser tx for dysplasia;1992    DIALYSIS FISTULA CREATION Right 2/10/2022    RIGHT BRACHIO CEPHALIC FISTULA CREATION performed by Heladio Pires MD at [address] Right     FOOT SURGERY Bilateral     FOOT SURGERY Left     IR TUNNELED CATHETER PLACEMENT GREATER THAN 5 YEARS  9/7/2021    IR TUNNELED CATHETER PLACEMENT GREATER THAN 5 YEARS 9/7/2021 Jim Aguilera MD [facility]       Social Hx:       Social History     Socioeconomic History    Marital status:      Spouse name: Lidia Bland    Number of children: 0    Years of education: Not on file    Highest education level: Not on file   Occupational History    Occupation: works as    Tobacco Use    Smoking status: Every Day     Packs/day: 1.00     Types: Cigarettes    Smokeless tobacco: Never    Tobacco comments:     Quit in August 2021- started back up   Vaping Use    Vaping Use: Never used   Substance and Sexual Activity    Alcohol use: No     Comment: Very Rare Drug use: No    Sexual activity: Yes     Partners: Male   Other Topics Concern    Not on file   Social History Narrative    Not on file     Social Determinants of Health     Financial Resource Strain: Not on file   Food Insecurity: Not on file   Transportation Needs: Not on file   Physical Activity: Not on file   Stress: Not on file   Social Connections: Not on file   Intimate Partner Violence: Not on file   Housing Stability: Not on file         Medications:  Discharge Medication List as of 9/2/2022  5:45 PM        CONTINUE these medications which have NOT CHANGED    Details   ALPRAZolam (ALPRAZOLAM XR) 2 MG extended release tablet TAKE ONE TABLET BY MOUTH EVERY MORNING, Disp-30 tablet, R-0Normal      [employer] PEN NEEDLES 31G 31G X 8 MM MISC Starting Sun 6/12/2022, JAZZY, Historical Med      midodrine (PROAMATINE) 10 MG tablet TAKE ONE TABLET BY MOUTH THREE TIMES A DAY, Disp-90 tablet, R-5Normal      tiotropium-olodaterol (STIOLTO RESPIMAT) 2.5-2.5 MCG/ACT AERS Inhale 2 puffs into the lungs in the morning., Disp-4 g, R-5D/C bevespi - not coveredNormal      pregabalin (LYRICA) 75 MG capsule Take 1 capsule by mouth in the morning for 30 days. , Disp-30 capsule, R-2Normal      insulin lispro, 1 Unit Dial, 100 UNIT/ML SOPN Inject 0-6 Units into the skin 3 times daily (with meals) Per sliding scale, Disp-3 pen, R-0Normal      Dulaglutide (TRULICITY) 3 [place]/0.2XB SOPN Inject 3 mg into the skin once a week, Disp-4 pen, R-2Dose adjustmentNormal      insulin glargine (LANTUS;BASAGLAR) 100 UNIT/ML injection pen Inject 10 Units into the skin nightly, Disp-5 pen, R-3Dose adjustmentNormal      albuterol sulfate HFA (PROVENTIL;VENTOLIN;PROAIR) 108 (90 Base) MCG/ACT inhaler Inhale 2 puffs into the lungs every 6 hours as needed for Wheezing or Shortness of Breath, Disp-18 g, R-1Normal      Atogepant (QULIPTA) 10 MG TABS Take 10 mg by mouth daily, Disp-30 tablet, R-5D/C ajovyPrint      desvenlafaxine succinate (PRISTIQ) 25 MG TB24 extended release tablet Take 1 tablet by mouth in the morning., Disp-30 tablet, R-1Stop venlafaxinePrint      furosemide (LASIX) 80 MG tablet Take 80 mg by mouth daily Historical Med      rizatriptan (MAXALT) 10 MG tablet Take 1 tablet by mouth once as needed for Migraine May repeat in 2 hours if needed, Disp-9 tablet, R-5Normal      Handicap Placard MISC Starting Thu 5/26/2022, Disp-1 each, R-0, PrintExpires 5/26/2027      atorvastatin (LIPITOR) 40 MG tablet Take 1 tablet by mouth nightly, Disp-30 tablet, R-11Normal      aspirin 81 MG EC tablet Take 1 tablet by mouth daily, Disp-30 tablet,R-3Normal             Allergies:  Amoxicillin, Levofloxacin, Vancomycin, and Tape [adhesive tape]    ROS:  10pt review of systems was performed and was negative except as noted in HPI     Imaging:  XR CHEST PORTABLE   Final Result   No acute cardiopulmonary abnormality.              Labs:  Results for orders placed or performed during the hospital encounter of 09/02/22   CBC with Auto Differential   Result Value Ref Range    WBC 12.7 (H) 4.0 - 11.0 K/uL    RBC 3.05 (L) 4.00 - 5.20 M/uL    Hemoglobin 9.5 (L) 12.0 - 16.0 g/dL    Hematocrit 28.2 (L) 36.0 - 48.0 %    MCV 92.5 80.0 - 100.0 fL    MCH 31.2 26.0 - 34.0 pg    MCHC 33.8 31.0 - 36.0 g/dL    RDW 17.0 (H) 12.4 - 15.4 %    Platelets 464 036 - 562 K/uL    MPV 8.4 5.0 - 10.5 fL    Neutrophils % 67.8 %    Lymphocytes % 20.7 %    Monocytes % 5.4 %    Eosinophils % 5.4 %    Basophils % 0.7 %    Neutrophils Absolute 8.6 (H) 1.7 - 7.7 K/uL    Lymphocytes Absolute 2.6 1.0 - 5.1 K/uL    Monocytes Absolute 0.7 0.0 - 1.3 K/uL    Eosinophils Absolute 0.7 (H) 0.0 - 0.6 K/uL    Basophils Absolute 0.1 0.0 - 0.2 K/uL   Comprehensive Metabolic Panel   Result Value Ref Range    Sodium 136 136 - 145 mmol/L    Potassium 3.9 3.5 - 5.1 mmol/L    Chloride 95 (L) 99 - 110 mmol/L    CO2 27 21 - 32 mmol/L    Anion Gap 14 3 - 16    Glucose 179 (H) 70 - 99 mg/dL    BUN 51 (H) 7 - 20 mg/dL    Creatinine 7.0 (HH) 0.6 - 1.1 mg/dL    GFR Non- 6 (A) >60    GFR  8 (A) >60    Calcium 9.2 8.3 - 10.6 mg/dL    Total Protein 7.0 6.4 - 8.2 g/dL    Albumin 3.8 3.4 - 5.0 g/dL    Albumin/Globulin Ratio 1.2 1.1 - 2.2    Total Bilirubin 0.4 0.0 - 1.0 mg/dL    Alkaline Phosphatase 173 (H) 40 - 129 U/L    ALT <5 (L) 10 - 40 U/L    AST 18 15 - 37 U/L   EKG 12 Lead   Result Value Ref Range    Ventricular Rate 97 BPM    Atrial Rate 97 BPM    P-R Interval 142 ms    QRS Duration 90 ms    Q-T Interval 362 ms    QTc Calculation (Bazett) 459 ms    P Axis 19 degrees    R Axis 9 degrees    T Axis 80 degrees    Diagnosis       Normal sinus rhythmNonspecific ST & T wave changesConfirmed by Anton Mallory ([id number]) on 9/2/2022 8:14:04 PM       Screenings:     Elver Coma Scale  Eye Opening: Spontaneous  Best Verbal Response: Oriented  Best Motor Response: Obeys commands  Elver Coma Scale Score: 15              Physical Exam:  Vitals: BP (!) 164/83   Pulse (!) 103   Temp 99.8 °F (37.7 °C) (Oral)   Resp 18   Ht 5' 7\" (1.702 m)   Wt 207 lb (93.9 kg)   LMP 09/01/2022   SpO2 97%   BMI 32.42 kg/m²    General: awake, alert, no apparent distress  Pupils: equal, reactive  Eyes: EOM intact, conjunctiva clear, no discharge  Head: Non-traumatic  Neck: Supple  Mouth: Moist, no oral lesions, no tonsillar enlargement  Heart: Rate as noted, regular rhythm, no murmur or rubs. Chest/Lungs: CTAB, no wheezes or crackles. Dialysis port  Abdomen: soft, nondistended, no tenderness to palpation   Back: No midline tenderness. No CVA tenderness  Extremities:  cap refill <2 UE/LE, no tenderness of calves, no edema. Right dialysis fistula  Neuro: Moving all extremities, no facial droop, no slurred speech, answers questions appropriately. Skin: Warm.  No visible rash, lesions, or bruising           DEE Ochoa [place]  09/02/22 [id number]

## 2022-09-02 NOTE — ED NOTES
Discharge instructions and prescriptions reviewed with pt. Pt allowed opportunity to ask questions and verbalized understanding.       Krista Wells RN  09/02/22 [id number]

## 2022-09-02 NOTE — TELEPHONE ENCOUNTER
Patient called to get an appt. With Dr. Mesha Vasquez today she stated her dialysis clinic found out today that her fistula is not working properly. She stated she will be going to the ER, due to Dr. Mesha Vasquez not being available today.

## 2022-09-02 NOTE — ED PROVIDER NOTES
EKG is reviewed myself. Dated today [phone].   Rate 97 sinus rhythm normal EKG     Mikayla Greco MD  09/02/22 3385

## 2022-09-05 ENCOUNTER — HOSPITAL ENCOUNTER (INPATIENT)
Age: 47
LOS: 2 days | Discharge: HOME | DRG: 314 | End: 2022-09-07
Attending: EMERGENCY MEDICINE | Admitting: HOSPITALIST
Payer: MEDICARE

## 2022-09-05 DIAGNOSIS — T82.41XA HEMODIALYSIS CATHETER DYSFUNCTION, INITIAL ENCOUNTER (HCC): Primary | ICD-10-CM

## 2022-09-05 DIAGNOSIS — Z99.2 PRESENCE OF ARTERIOVENOUS FISTULA FOR HEMODIALYSIS (HCC): ICD-10-CM

## 2022-09-05 PROBLEM — E87.70 VOLUME OVERLOAD: Status: ACTIVE | Noted: 2022-09-05

## 2022-09-05 LAB
A/G RATIO: 1.2 (ref 1.1–2.2)
ALBUMIN SERPL-MCNC: 3.5 G/DL (ref 3.4–5)
ALP BLD-CCNC: 137 U/L (ref 40–129)
ALT SERPL-CCNC: 6 U/L (ref 10–40)
ANION GAP SERPL CALCULATED.3IONS-SCNC: 18 MMOL/L (ref 3–16)
AST SERPL-CCNC: 15 U/L (ref 15–37)
BASOPHILS ABSOLUTE: 0.1 K/UL (ref 0–0.2)
BASOPHILS RELATIVE PERCENT: 0.6 %
BILIRUB SERPL-MCNC: 0.3 MG/DL (ref 0–1)
BUN BLDV-MCNC: 72 MG/DL (ref 7–20)
CALCIUM SERPL-MCNC: 8.5 MG/DL (ref 8.3–10.6)
CHLORIDE BLD-SCNC: 96 MMOL/L (ref 99–110)
CO2: 21 MMOL/L (ref 21–32)
CREAT SERPL-MCNC: 10.3 MG/DL (ref 0.6–1.1)
EOSINOPHILS ABSOLUTE: 0.6 K/UL (ref 0–0.6)
EOSINOPHILS RELATIVE PERCENT: 5.2 %
GFR AFRICAN AMERICAN: 5
GFR NON-AFRICAN AMERICAN: 4
GLUCOSE BLD-MCNC: 168 MG/DL (ref 70–99)
GLUCOSE BLD-MCNC: 175 MG/DL (ref 70–99)
GLUCOSE BLD-MCNC: 222 MG/DL (ref 70–99)
HCT VFR BLD CALC: 28.3 % (ref 36–48)
HEMOGLOBIN: 9.3 G/DL (ref 12–16)
LYMPHOCYTES ABSOLUTE: 1.9 K/UL (ref 1–5.1)
LYMPHOCYTES RELATIVE PERCENT: 16.1 %
MCH RBC QN AUTO: 30.4 PG (ref 26–34)
MCHC RBC AUTO-ENTMCNC: 32.7 G/DL (ref 31–36)
MCV RBC AUTO: 93 FL (ref 80–100)
MONOCYTES ABSOLUTE: 0.8 K/UL (ref 0–1.3)
MONOCYTES RELATIVE PERCENT: 6.5 %
NEUTROPHILS ABSOLUTE: 8.6 K/UL (ref 1.7–7.7)
NEUTROPHILS RELATIVE PERCENT: 71.6 %
PDW BLD-RTO: 17.6 % (ref 12.4–15.4)
PERFORMED ON: ABNORMAL
PERFORMED ON: ABNORMAL
PLATELET # BLD: 219 K/UL (ref 135–450)
PMV BLD AUTO: 8.2 FL (ref 5–10.5)
POTASSIUM REFLEX MAGNESIUM: 4 MMOL/L (ref 3.5–5.1)
RBC # BLD: 3.05 M/UL (ref 4–5.2)
SODIUM BLD-SCNC: 135 MMOL/L (ref 136–145)
TOTAL PROTEIN: 6.5 G/DL (ref 6.4–8.2)
WBC # BLD: 12 K/UL (ref 4–11)

## 2022-09-05 PROCEDURE — 83036 HEMOGLOBIN GLYCOSYLATED A1C: CPT

## 2022-09-05 PROCEDURE — 85025 COMPLETE CBC W/AUTO DIFF WBC: CPT

## 2022-09-05 PROCEDURE — 80053 COMPREHEN METABOLIC PANEL: CPT

## 2022-09-05 PROCEDURE — 2500000003 HC RX 250 WO HCPCS: Performed by: EMERGENCY MEDICINE

## 2022-09-05 PROCEDURE — 2500000003 HC RX 250 WO HCPCS: Performed by: PHYSICIAN ASSISTANT

## 2022-09-05 PROCEDURE — 2580000003 HC RX 258: Performed by: HOSPITALIST

## 2022-09-05 PROCEDURE — 99285 EMERGENCY DEPT VISIT HI MDM: CPT

## 2022-09-05 PROCEDURE — 6370000000 HC RX 637 (ALT 250 FOR IP): Performed by: HOSPITALIST

## 2022-09-05 PROCEDURE — 1200000000 HC SEMI PRIVATE

## 2022-09-05 PROCEDURE — 36415 COLL VENOUS BLD VENIPUNCTURE: CPT

## 2022-09-05 RX ORDER — SODIUM CHLORIDE 0.9 % (FLUSH) 0.9 %
5-40 SYRINGE (ML) INJECTION EVERY 12 HOURS SCHEDULED
Status: DISCONTINUED | OUTPATIENT
Start: 2022-09-05 | End: 2022-09-07 | Stop reason: HOSPADM

## 2022-09-05 RX ORDER — ALBUTEROL SULFATE 90 UG/1
2 AEROSOL, METERED RESPIRATORY (INHALATION) EVERY 6 HOURS PRN
Status: DISCONTINUED | OUTPATIENT
Start: 2022-09-05 | End: 2022-09-07 | Stop reason: HOSPADM

## 2022-09-05 RX ORDER — LABETALOL HYDROCHLORIDE 5 MG/ML
10 INJECTION, SOLUTION INTRAVENOUS ONCE
Status: COMPLETED | OUTPATIENT
Start: 2022-09-05 | End: 2022-09-05

## 2022-09-05 RX ORDER — SODIUM CHLORIDE 0.9 % (FLUSH) 0.9 %
5-40 SYRINGE (ML) INJECTION PRN
Status: DISCONTINUED | OUTPATIENT
Start: 2022-09-05 | End: 2022-09-07 | Stop reason: HOSPADM

## 2022-09-05 RX ORDER — ASPIRIN 81 MG/1
81 TABLET ORAL DAILY
Status: DISCONTINUED | OUTPATIENT
Start: 2022-09-05 | End: 2022-09-07 | Stop reason: HOSPADM

## 2022-09-05 RX ORDER — POLYETHYLENE GLYCOL 3350 17 G/17G
17 POWDER, FOR SOLUTION ORAL DAILY PRN
Status: DISCONTINUED | OUTPATIENT
Start: 2022-09-05 | End: 2022-09-07 | Stop reason: HOSPADM

## 2022-09-05 RX ORDER — MIDODRINE HYDROCHLORIDE 5 MG/1
10 TABLET ORAL 3 TIMES DAILY PRN
Status: DISCONTINUED | OUTPATIENT
Start: 2022-09-05 | End: 2022-09-07 | Stop reason: HOSPADM

## 2022-09-05 RX ORDER — ONDANSETRON 2 MG/ML
4 INJECTION INTRAMUSCULAR; INTRAVENOUS EVERY 6 HOURS PRN
Status: DISCONTINUED | OUTPATIENT
Start: 2022-09-05 | End: 2022-09-07 | Stop reason: HOSPADM

## 2022-09-05 RX ORDER — INSULIN GLARGINE 100 [IU]/ML
10 INJECTION, SOLUTION SUBCUTANEOUS NIGHTLY
Status: DISCONTINUED | OUTPATIENT
Start: 2022-09-05 | End: 2022-09-07 | Stop reason: HOSPADM

## 2022-09-05 RX ORDER — DEXTROSE MONOHYDRATE 100 MG/ML
INJECTION, SOLUTION INTRAVENOUS CONTINUOUS PRN
Status: DISCONTINUED | OUTPATIENT
Start: 2022-09-05 | End: 2022-09-07 | Stop reason: HOSPADM

## 2022-09-05 RX ORDER — ACETAMINOPHEN 650 MG/1
650 SUPPOSITORY RECTAL EVERY 6 HOURS PRN
Status: DISCONTINUED | OUTPATIENT
Start: 2022-09-05 | End: 2022-09-07 | Stop reason: HOSPADM

## 2022-09-05 RX ORDER — ALPRAZOLAM 0.5 MG/1
0.5 TABLET ORAL 2 TIMES DAILY PRN
Status: DISCONTINUED | OUTPATIENT
Start: 2022-09-05 | End: 2022-09-06

## 2022-09-05 RX ORDER — ATORVASTATIN CALCIUM 40 MG/1
40 TABLET, FILM COATED ORAL NIGHTLY
Status: DISCONTINUED | OUTPATIENT
Start: 2022-09-05 | End: 2022-09-07 | Stop reason: HOSPADM

## 2022-09-05 RX ORDER — SODIUM CHLORIDE 9 MG/ML
INJECTION, SOLUTION INTRAVENOUS PRN
Status: DISCONTINUED | OUTPATIENT
Start: 2022-09-05 | End: 2022-09-07 | Stop reason: HOSPADM

## 2022-09-05 RX ORDER — FUROSEMIDE 40 MG/1
80 TABLET ORAL DAILY
Status: DISCONTINUED | OUTPATIENT
Start: 2022-09-05 | End: 2022-09-07 | Stop reason: HOSPADM

## 2022-09-05 RX ORDER — ONDANSETRON 4 MG/1
4 TABLET, ORALLY DISINTEGRATING ORAL EVERY 8 HOURS PRN
Status: DISCONTINUED | OUTPATIENT
Start: 2022-09-05 | End: 2022-09-07 | Stop reason: HOSPADM

## 2022-09-05 RX ORDER — INSULIN LISPRO 100 [IU]/ML
0-4 INJECTION, SOLUTION INTRAVENOUS; SUBCUTANEOUS NIGHTLY
Status: DISCONTINUED | OUTPATIENT
Start: 2022-09-05 | End: 2022-09-07 | Stop reason: HOSPADM

## 2022-09-05 RX ORDER — ACETAMINOPHEN 325 MG/1
650 TABLET ORAL EVERY 6 HOURS PRN
Status: DISCONTINUED | OUTPATIENT
Start: 2022-09-05 | End: 2022-09-07 | Stop reason: HOSPADM

## 2022-09-05 RX ORDER — INSULIN LISPRO 100 [IU]/ML
0-4 INJECTION, SOLUTION INTRAVENOUS; SUBCUTANEOUS
Status: DISCONTINUED | OUTPATIENT
Start: 2022-09-05 | End: 2022-09-07 | Stop reason: HOSPADM

## 2022-09-05 RX ORDER — DESVENLAFAXINE 25 MG/1
25 TABLET, EXTENDED RELEASE ORAL DAILY
Status: DISCONTINUED | OUTPATIENT
Start: 2022-09-05 | End: 2022-09-05

## 2022-09-05 RX ADMIN — Medication 10 ML: at 21:07

## 2022-09-05 RX ADMIN — ASPIRIN 81 MG: 81 TABLET, COATED ORAL at 18:56

## 2022-09-05 RX ADMIN — INSULIN GLARGINE 10 UNITS: 100 INJECTION, SOLUTION SUBCUTANEOUS at 21:09

## 2022-09-05 RX ADMIN — LABETALOL HYDROCHLORIDE 10 MG: 5 INJECTION, SOLUTION INTRAVENOUS at 23:17

## 2022-09-05 RX ADMIN — ATORVASTATIN CALCIUM 40 MG: 40 TABLET, FILM COATED ORAL at 21:07

## 2022-09-05 RX ADMIN — ALPRAZOLAM 0.5 MG: 0.5 TABLET ORAL at 21:07

## 2022-09-05 RX ADMIN — LABETALOL HYDROCHLORIDE 10 MG: 5 INJECTION, SOLUTION INTRAVENOUS at 17:09

## 2022-09-05 RX ADMIN — FUROSEMIDE 80 MG: 40 TABLET ORAL at 18:56

## 2022-09-05 ASSESSMENT — PAIN SCALES - GENERAL
PAINLEVEL_OUTOF10: 0
PAINLEVEL_OUTOF10: 0

## 2022-09-05 NOTE — H&P
HOSPITALISTS HISTORY AND PHYSICAL    9/5/2022 4:47 PM    Patient Information:  Meir Almazan is a 52 y.o. female [id number]  PCP:  Guzman Stone MD (Tel: [phone] )    Chief complaint:    Chief Complaint   Patient presents with    Other     States was sent in to have dialysis today and dialysis could not access fistula. Pt told to come to ER. History of Present Illness:  Kentrell Jiang is a 52 y.o. female who presented with complaints of unable to get dialysis. Patient was sent here from dialysis due to not able to access fistula. Patient had to come to ER. Patient said has missed 2 sessions of dialysis patient denies any fevers chills no shortness of breath no nausea vomiting or diarrhea. No chest pain no other symptoms. Patient with complex medical history including CHF chronic kidney disease depression diabetes history of cerebral artery occlusion legally blind on both eye and        REVIEW OF SYSTEMS:   Constitutional: Negative for fever,chills or night sweats  ENT: Negative for rhinorrhea, epistaxis, hoarseness, sore throat. Respiratory: Negative for shortness of breath,wheezing  Cardiovascular: Negative for chest pain, palpitations   Gastrointestinal: Negative for nausea, vomiting, diarrhea  Genitourinary: Negative for polyuria, dysuria   Hematologic/Lymphatic: Negative for bleeding tendency, easy bruising  Musculoskeletal: Negative for myalgias and arthralgias  Neurologic: Negative for confusion,dysarthria. Skin: Negative for itching,rash, good capillary refill. Psychiatric: Negative for depression,anxiety, agitation. Endocrine: Negative for polydipsia,polyuria,heat /cold intolerance.     Past Medical History:   has a past medical history of Blind in both eyes, Cerebral artery occlusion with cerebral infarction ([facility] CHF (congestive heart failure) ([facility] Chronic kidney disease, COPD (chronic obstructive pulmonary disease) ([facility] [facility] Depression, Diabetes mellitus out of control ([facility] Diabetes mellitus, type II ([facility] Diabetic neuropathy associated with type 2 diabetes mellitus ([facility] Generalized headaches, Hypertension, Infertility, Insomnia, Migraine headache, Mixed hyperlipidemia, Otitis media, Pelvic abscess in female, Pneumonia, Stroke [facility]), and Stroke ([facility] Past Surgical History:   has a past surgical history that includes Cervix surgery; eye surgery; Foot surgery (Right); Foot surgery (Bilateral); Foot surgery (Left); IR TUNNELED CVC PLACE WO SQ PORT/PUMP > 5 YEARS (9/7/2021); and Dialysis fistula creation (Right, 2/10/2022). Medications:  No current facility-administered medications on file prior to encounter. Current Outpatient Medications on File Prior to Encounter   Medication Sig Dispense Refill    ALPRAZolam (ALPRAZOLAM XR) 2 MG extended release tablet TAKE ONE TABLET BY MOUTH EVERY MORNING 30 tablet 0    [employer] PEN NEEDLES 31G 31G X 8 MM MISC       midodrine (PROAMATINE) 10 MG tablet TAKE ONE TABLET BY MOUTH THREE TIMES A DAY 90 tablet 5    tiotropium-olodaterol (STIOLTO RESPIMAT) 2.5-2.5 MCG/ACT AERS Inhale 2 puffs into the lungs in the morning. 4 g 5    pregabalin (LYRICA) 75 MG capsule Take 1 capsule by mouth in the morning for 30 days.  30 capsule 2    insulin lispro, 1 Unit Dial, 100 UNIT/ML SOPN Inject 0-6 Units into the skin 3 times daily (with meals) Per sliding scale 3 pen 0    Dulaglutide (TRULICITY) 3 YA/0.5OG SOPN Inject 3 mg into the skin once a week 4 pen 2    insulin glargine (LANTUS;BASAGLAR) 100 UNIT/ML injection pen Inject 10 Units into the skin nightly 5 pen 3    albuterol sulfate HFA (PROVENTIL;VENTOLIN;PROAIR) 108 (90 Base) MCG/ACT inhaler Inhale 2 puffs into the lungs every 6 hours as needed for Wheezing or Shortness of Breath 18 g 1    Atogepant (QULIPTA) 10 MG TABS Take 10 mg by mouth daily 30 tablet 5    desvenlafaxine succinate (PRISTIQ) 25 MG TB24 extended release tablet Take 1 tablet by mouth in the morning. 30 tablet 1    furosemide (LASIX) 80 MG tablet Take 80 mg by mouth daily       rizatriptan (MAXALT) 10 MG tablet Take 1 tablet by mouth once as needed for Migraine May repeat in 2 hours if needed 9 tablet 5    Handicap Placard MISC by Does not apply route Expires 5/26/2027 1 each 0    atorvastatin (LIPITOR) 40 MG tablet Take 1 tablet by mouth nightly 30 tablet 11    aspirin 81 MG EC tablet Take 1 tablet by mouth daily 30 tablet 3       Allergies: Allergies   Allergen Reactions    Amoxicillin Hives, Itching and Other (See Comments)     Tolerates cephalosporins  Patient tolerating cefazolin (ANCEF) as of October 11, 2018      Levofloxacin Anaphylaxis    Vancomycin Anaphylaxis, Hives and Shortness Of Breath    Tape [Adhesive Tape] Other (See Comments)     Paper tape turns skin bright red. Plastic tape okay. Social History:   reports that she has been smoking cigarettes. She has been smoking an average of 1 pack per day. She has never used smokeless tobacco. She reports that she does not drink alcohol and does not use drugs. Family History:  family history includes Alcohol Abuse in her maternal grandfather and paternal grandfather; Albrecht Iba in her father; Diabetes in her father, mother, paternal aunt, paternal grandmother, paternal uncle, and sister; High Blood Pressure in her father; Other (age of onset: 79) in her mother. ,     Physical Exam:  BP (!) 187/93   Pulse (!) 105   Temp 98.5 °F (36.9 °C) (Oral)   Resp 18   LMP 09/01/2022   SpO2 98%     General appearance:  Appears comfortable. Well nourished  Eyes: Sclera clear, pupils equal  ENT: Moist mucus membranes, no thrush. Trachea midline. Cardiovascular: Regular rhythm, normal S1, S2. No murmur, gallop, rub.  No edema in lower extremities  Respiratory: Clear to auscultation bilaterally, no wheeze, good inspiratory effort  Gastrointestinal: Abdomen soft, non-tender, not distended, normal bowel sounds  Musculoskeletal: No cyanosis in digits, neck supple  Neurology: Cranial nerves grossly intact. Alert and oriented in time, place and person. No speech or motor deficits  Psychiatry: Appropriate affect. Not agitated  Skin: Warm, dry, normal turgor, no rash    Labs:  CBC:   Lab Results   Component Value Date/Time    WBC 12.0 09/05/2022 03:13 PM    RBC 3.05 09/05/2022 03:13 PM    HGB 9.3 09/05/2022 03:13 PM    HCT 28.3 09/05/2022 03:13 PM    MCV 93.0 09/05/2022 03:13 PM    MCH 30.4 09/05/2022 03:13 PM    MCHC 32.7 09/05/2022 03:13 PM    RDW 17.6 09/05/2022 03:13 PM     09/05/2022 03:13 PM    MPV 8.2 09/05/2022 03:13 PM     BMP:    Lab Results   Component Value Date/Time     09/05/2022 03:13 PM    K 4.0 09/05/2022 03:13 PM    CL 96 09/05/2022 03:13 PM    CO2 21 09/05/2022 03:13 PM    BUN 72 09/05/2022 03:13 PM    CREATININE 10.3 09/05/2022 03:13 PM    CALCIUM 8.5 09/05/2022 03:13 PM    GFRAA 5 09/05/2022 03:13 PM    GFRAA >60 11/09/2011 02:25 PM    LABGLOM 4 09/05/2022 03:13 PM    GLUCOSE 175 09/05/2022 03:13 PM       Chest Xray:   EKG:    I visualized CXR images and EKG strips     Discussed  with     Problem List  Principal Problem:    Volume overload  Resolved Problems:    * No resolved hospital problems.  *        Assessment/Plan:   AV fistula malfunction  -With unable to dialyze the catheter  -No signs of acute volume overload right now potassium is stable  -Nephrology was consulted  -Plan for dialysis likely tomorrow in nonemergent setting    Hypertension  -Continue current home medication      Insulin-dependent diabetes mellitus continue Lantus sliding scale            Lucille Robles MD    9/5/2022 4:47 PM

## 2022-09-05 NOTE — ED PROVIDER NOTES
CHIEF COMPLAINT  Other (States was sent in to have dialysis today and dialysis could not access fistula. Pt told to come to ER. )      HISTORY OF PRESENT ILLNESS  Abraham Mercer is a 52 y.o. female who  has a past medical history of Blind in both eyes, Cerebral artery occlusion with cerebral infarction ([address] CHF (congestive heart failure) ([address] Chronic kidney disease, COPD (chronic obstructive pulmonary disease) ([address] Depression, Diabetes mellitus out of control ([address] Diabetes mellitus, type II ([address] Diabetic neuropathy associated with type 2 diabetes mellitus ([address] Generalized headaches, Hypertension, Infertility, Insomnia, Migraine headache, Mixed hyperlipidemia, Otitis media, Pelvic abscess in female, Pneumonia, Stroke ([address] and Stroke ([address] presents to the ED complaining of pain in his dialysis catheter and fistula not working. Patient states that she has had to miss sessions of dialysis now secondary to her dialysis catheter and fistula not working appropriately. Patient states they did attempt to access the fistula today unsuccessfully. States that she does feel slightly fluid overloaded noticing some edema of her face. Denies any shortness of breath. No chest pain. No fevers or chills. No nausea or vomiting. Patient follows with Dr. Levi Le as her nephrologist.  No other complaints, modifying factors or associated symptoms. Pt was seen during the [place] 19 pandemic. Appropriate PPE worn by ME during patient encounters. Patient was cared for during a time with constrained hospital bed capacity with nationwide stress on resources and staffing. Nursing notes reviewed.    Past Medical History:   Diagnosis Date    Blind in both eyes     Cerebral artery occlusion with cerebral infarction ([address]     CHF (congestive heart failure) (HCC)     Chronic kidney disease     COPD (chronic obstructive pulmonary disease) ([address]     Depression     Diabetes mellitus out of control ([address]     Diabetes mellitus, type II ([facility]     2005    Diabetic neuropathy associated with type 2 diabetes mellitus ([facility]     Generalized headaches     Hypertension     Infertility     Insomnia     chronic vs lack of time spent to sleep    Migraine headache 11/09/2011    Mixed hyperlipidemia     Otitis media     h/o recurrent    Pelvic abscess in female 10/05/2013    Pneumonia     2004 approx.     Stroke ([facility] 08/27/2020    Stroke ([facility] 08/27/2021     Past Surgical History:   Procedure Laterality Date    CERVIX SURGERY      laser tx for dysplasia;1992    DIALYSIS FISTULA CREATION Right 2/10/2022    RIGHT BRACHIO CEPHALIC FISTULA CREATION performed by Michelle Najera MD at [address] Right     FOOT SURGERY Bilateral     FOOT SURGERY Left     IR TUNNELED CATHETER PLACEMENT GREATER THAN 5 YEARS  9/7/2021    IR TUNNELED CATHETER PLACEMENT GREATER THAN 5 YEARS [facility]7/2021 [facility]     Family History   Problem Relation Age of Onset    Diabetes Mother     Other Mother 79        Covid    Diabetes Father     High Blood Pressure Father     Colon Cancer Father     Diabetes Sister     Alcohol Abuse Maternal Grandfather     Diabetes Paternal Grandmother     Alcohol Abuse Paternal Grandfather     Diabetes Paternal Aunt     Diabetes Paternal Uncle      Social History     Socioeconomic History    Marital status:      Spouse name: Kentrell Barksdale    Number of children: 0    Years of education: Not on file    Highest education level: Not on file   Occupational History    Occupation: works as    Tobacco Use    Smoking status: Every Day     Packs/day: 1.00     Types: Cigarettes    Smokeless tobacco: Never    Tobacco comments:     Quit in August 2021- started back up   Vaping Use    Vaping Use: Never used   Substance and Sexual Activity    Alcohol use: No     Comment: Very Rare    Drug use: No    Sexual activity: Yes     Partners: Male   Other Topics Concern    Not on file   Social History Narrative    Not on file     Social Determinants of Health     Financial Resource Strain: Not on file   Food Insecurity: Not on file   Transportation Needs: Not on file   Physical Activity: Not on file   Stress: Not on file   Social Connections: Not on file   Intimate Partner Violence: Not on file   Housing Stability: Not on file     No current facility-administered medications for this encounter. Current Outpatient Medications   Medication Sig Dispense Refill    ALPRAZolam (ALPRAZOLAM XR) 2 MG extended release tablet TAKE ONE TABLET BY MOUTH EVERY MORNING 30 tablet 0    [employer] PEN NEEDLES 31G 31G X 8 MM MISC       midodrine (PROAMATINE) 10 MG tablet TAKE ONE TABLET BY MOUTH THREE TIMES A DAY 90 tablet 5    tiotropium-olodaterol (STIOLTO RESPIMAT) 2.5-2.5 MCG/ACT AERS Inhale 2 puffs into the lungs in the morning. 4 g 5    pregabalin (LYRICA) 75 MG capsule Take 1 capsule by mouth in the morning for 30 days. 30 capsule 2    insulin lispro, 1 Unit Dial, 100 UNIT/ML SOPN Inject 0-6 Units into the skin 3 times daily (with meals) Per sliding scale 3 pen 0    Dulaglutide (TRULICITY) 3 ZH/6.4BJ SOPN Inject 3 mg into the skin once a week 4 pen 2    insulin glargine (LANTUS;BASAGLAR) 100 UNIT/ML injection pen Inject 10 Units into the skin nightly 5 pen 3    albuterol sulfate HFA (PROVENTIL;VENTOLIN;PROAIR) 108 (90 Base) MCG/ACT inhaler Inhale 2 puffs into the lungs every 6 hours as needed for Wheezing or Shortness of Breath 18 g 1    Atogepant (QULIPTA) 10 MG TABS Take 10 mg by mouth daily 30 tablet 5    desvenlafaxine succinate (PRISTIQ) 25 MG TB24 extended release tablet Take 1 tablet by mouth in the morning.  30 tablet 1    furosemide (LASIX) 80 MG tablet Take 80 mg by mouth daily       rizatriptan (MAXALT) 10 MG tablet Take 1 tablet by mouth once as needed for Migraine May repeat in 2 hours if needed 9 tablet 5    Handicap Placard MISC by Does not apply route Expires 5/26/2027 1 each 0    atorvastatin (LIPITOR) 40 MG tablet Take 1 tablet by mouth nightly 30 tablet 11    aspirin 81 MG EC tablet Take 1 tablet by mouth daily 30 tablet 3     Allergies   Allergen Reactions    Amoxicillin Hives, Itching and Other (See Comments)     Tolerates cephalosporins  Patient tolerating cefazolin (ANCEF) as of October 11, 2018      Levofloxacin Anaphylaxis    Vancomycin Anaphylaxis, Hives and Shortness Of Breath    Tape [Adhesive Tape] Other (See Comments)     Paper tape turns skin bright red. Plastic tape okay. REVIEW OF SYSTEMS  (up to 7 for level 4, 8 or more for level 5) pertinent positives per HPI otherwise noted to be negative    PHYSICAL EXAM  BP (!) 187/93   Pulse (!) 105   Temp 98.5 °F (36.9 °C) (Oral)   Resp 18   LMP 09/01/2022   SpO2 98%      CONSTITUTIONAL: AOx4, cooperative with exam, afebrile   HEAD: normocephalic, atraumatic   EYES: PERRL, EOMI, anicteric sclera, mild periorbital edema bilaterally   ENT: Moist mucous membranes, uvula midline, no swelling of the oropharynx   NECK: Supple, symmetric, trachea midline, no stridor   LUNGS: Bilateral breath sounds, CTAB, no rales/ronchi/wheezes, speaking in full sentences   CARDIOVASCULAR: RRR, normal S1/S2, no m/r/g, 2+ pulses throughout, fistula present in the right bicep with audible thrill   ABDOMEN: Soft, non-tender, non-distended, +BS   NEUROLOGIC:  MAEx4, GCS 15   MUSCULOSKELETAL: No clubbing, cyanosis or edema, dialysis catheter in the right upper chest   SKIN: No rash, pallor or wounds on exposed surfaces         RADIOLOGY  X-RAYS:  I have reviewed radiologic plain film image(s). ALL OTHER NON-PLAIN FILM IMAGES SUCH AS CT, ULTRASOUND AND MRI HAVE BEEN READ BY THE RADIOLOGIST. No orders to display          EKG INTERPRETATION  None    PROCEDURES    ED COURSE/MDM  Fistula dysfunction, tunnel catheter dysfunction, ESRD needing dialysis  Patient seen and evaluated. History and physical as above. Nontoxic, afebrile.   Patient presents for evaluation after her dialysis catheter did not function appropriately today as well as during her last session of dialysis. Patient has missed 2 sessions of dialysis now. Lungs good auscultation bilaterally. O2 saturation 98% on room air. We will obtain routine labs and consult with nephrology. ED Course as of 09/05/22 1505 [facility] Sep 05, 2022   1604 Consult placed to patient's nephrologist to discuss her need for dialysis as well as missed dialysis for the past 2 sessions. Also patient's port and fistula not working appropriately. [DS]   3910 Spoke with Dr. Adriane Johnston, nephrology, the discussed patient's care plan and missed dialysis. He recommends admitting the patient for replacement of her tunneled catheter and dialysis during her admission. Consult placed to hospitalist for admission. [DS]      ED Course User Index  [DS] Ida Malagon MD       Is this patient to be included in the SEP-1 Core Measure due to severe sepsis or septic shock? No   Exclusion criteria - the patient is NOT to be included for SEP-1 Core Measure due to: Infection is not suspected      Patient was given scripts for the following medications. I counseled patient how to take these medications. New Prescriptions    No medications on file     CRITICAL CARE TIME   Total Critical Care time was 10 minutes, excluding separately reportable procedures. There was a high probability of clinically significant/life threatening deterioration in the patient's condition which required my urgent intervention. CLINICAL IMPRESSION  1. Hemodialysis catheter dysfunction, initial encounter ([facility]    2. Presence of arteriovenous fistula for hemodialysis (HCC)        Blood pressure (!) 187/93, pulse (!) 105, temperature 98.5 °F (36.9 °C), temperature source Oral, resp. rate 18, last menstrual period 09/01/2022, SpO2 98 %. DISPOSITION  Admitted     Disclaimer: All medical record entries made by [address] [place] [address] [facility].       (Please note that this note was completed with a voice recognition program. Every attempt was made to edit the dictations, but inevitably there remain words that are mis-transcribed.)            Flori Sharma MD  09/05/22 7882

## 2022-09-05 NOTE — ED NOTES
Bedside report given to receiving nurse Marlene De La Cruz. Transported by w/c to room.       Uzma Oneill RN  09/05/22 2099

## 2022-09-05 NOTE — PROGRESS NOTES
Pt admitted to room 3310. VSS obtained and are stable. Pt is placed on tele. Fall precautions in place, call light within reach. Will monitor.

## 2022-09-06 ENCOUNTER — APPOINTMENT (OUTPATIENT)
Dept: INTERVENTIONAL RADIOLOGY/VASCULAR | Age: 47
DRG: 314 | End: 2022-09-06
Payer: MEDICARE

## 2022-09-06 PROBLEM — T82.41XA HEMODIALYSIS CATHETER DYSFUNCTION (HCC): Status: ACTIVE | Noted: 2022-09-06

## 2022-09-06 LAB
ALBUMIN SERPL-MCNC: 3.4 G/DL (ref 3.4–5)
ANION GAP SERPL CALCULATED.3IONS-SCNC: 20 MMOL/L (ref 3–16)
BUN BLDV-MCNC: 80 MG/DL (ref 7–20)
CALCIUM SERPL-MCNC: 8.4 MG/DL (ref 8.3–10.6)
CHLORIDE BLD-SCNC: 96 MMOL/L (ref 99–110)
CO2: 21 MMOL/L (ref 21–32)
CREAT SERPL-MCNC: 10.5 MG/DL (ref 0.6–1.1)
ESTIMATED AVERAGE GLUCOSE: 168.6 MG/DL
GFR AFRICAN AMERICAN: 5
GFR NON-AFRICAN AMERICAN: 4
GLUCOSE BLD-MCNC: 125 MG/DL (ref 70–99)
GLUCOSE BLD-MCNC: 150 MG/DL (ref 70–99)
GLUCOSE BLD-MCNC: 220 MG/DL (ref 70–99)
GLUCOSE BLD-MCNC: 280 MG/DL (ref 70–99)
HBA1C MFR BLD: 7.5 %
HCT VFR BLD CALC: 24.5 % (ref 36–48)
HEMOGLOBIN: 8.1 G/DL (ref 12–16)
MCH RBC QN AUTO: 30.7 PG (ref 26–34)
MCHC RBC AUTO-ENTMCNC: 32.9 G/DL (ref 31–36)
MCV RBC AUTO: 93 FL (ref 80–100)
PDW BLD-RTO: 17.5 % (ref 12.4–15.4)
PERFORMED ON: ABNORMAL
PHOSPHORUS: 8.6 MG/DL (ref 2.5–4.9)
PLATELET # BLD: 209 K/UL (ref 135–450)
PMV BLD AUTO: 8.6 FL (ref 5–10.5)
POTASSIUM SERPL-SCNC: 4.4 MMOL/L (ref 3.5–5.1)
RBC # BLD: 2.64 M/UL (ref 4–5.2)
SODIUM BLD-SCNC: 137 MMOL/L (ref 136–145)
WBC # BLD: 12.6 K/UL (ref 4–11)

## 2022-09-06 PROCEDURE — 36415 COLL VENOUS BLD VENIPUNCTURE: CPT

## 2022-09-06 PROCEDURE — 6370000000 HC RX 637 (ALT 250 FOR IP): Performed by: HOSPITALIST

## 2022-09-06 PROCEDURE — 94640 AIRWAY INHALATION TREATMENT: CPT

## 2022-09-06 PROCEDURE — APPNB30 APP NON BILLABLE TIME 0-30 MINS: Performed by: NURSE PRACTITIONER

## 2022-09-06 PROCEDURE — 36581 REPLACE TUNNELED CV CATH: CPT

## 2022-09-06 PROCEDURE — 6370000000 HC RX 637 (ALT 250 FOR IP): Performed by: PHYSICIAN ASSISTANT

## 2022-09-06 PROCEDURE — 6360000002 HC RX W HCPCS: Performed by: STUDENT IN AN ORGANIZED HEALTH CARE EDUCATION/TRAINING PROGRAM

## 2022-09-06 PROCEDURE — 1200000000 HC SEMI PRIVATE

## 2022-09-06 PROCEDURE — 02HV33Z INSERTION OF INFUSION DEVICE INTO SUPERIOR VENA CAVA, PERCUTANEOUS APPROACH: ICD-10-PCS | Performed by: HOSPITALIST

## 2022-09-06 PROCEDURE — 80069 RENAL FUNCTION PANEL: CPT

## 2022-09-06 PROCEDURE — 90935 HEMODIALYSIS ONE EVALUATION: CPT

## 2022-09-06 PROCEDURE — APPSS60 APP SPLIT SHARED TIME 46-60 MINUTES: Performed by: NURSE PRACTITIONER

## 2022-09-06 PROCEDURE — 6370000000 HC RX 637 (ALT 250 FOR IP): Performed by: STUDENT IN AN ORGANIZED HEALTH CARE EDUCATION/TRAINING PROGRAM

## 2022-09-06 PROCEDURE — 2500000003 HC RX 250 WO HCPCS: Performed by: STUDENT IN AN ORGANIZED HEALTH CARE EDUCATION/TRAINING PROGRAM

## 2022-09-06 PROCEDURE — C1750 CATH, HEMODIALYSIS,LONG-TERM: HCPCS

## 2022-09-06 PROCEDURE — 94761 N-INVAS EAR/PLS OXIMETRY MLT: CPT

## 2022-09-06 PROCEDURE — 85027 COMPLETE CBC AUTOMATED: CPT

## 2022-09-06 PROCEDURE — 77001 FLUOROGUIDE FOR VEIN DEVICE: CPT

## 2022-09-06 PROCEDURE — 0J2SXYZ CHANGE OTHER DEVICE IN HEAD AND NECK SUBCUTANEOUS TISSUE AND FASCIA, EXTERNAL APPROACH: ICD-10-PCS | Performed by: HOSPITALIST

## 2022-09-06 PROCEDURE — 2580000003 HC RX 258: Performed by: HOSPITALIST

## 2022-09-06 PROCEDURE — 5A1D70Z PERFORMANCE OF URINARY FILTRATION, INTERMITTENT, LESS THAN 6 HOURS PER DAY: ICD-10-PCS | Performed by: HOSPITALIST

## 2022-09-06 RX ORDER — BUTALBITAL, ACETAMINOPHEN AND CAFFEINE 50; 325; 40 MG/1; MG/1; MG/1
1 TABLET ORAL EVERY 6 HOURS PRN
Status: DISCONTINUED | OUTPATIENT
Start: 2022-09-06 | End: 2022-09-07 | Stop reason: HOSPADM

## 2022-09-06 RX ORDER — HEPARIN SODIUM 200 [USP'U]/100ML
INJECTION, SOLUTION INTRAVENOUS CONTINUOUS PRN
Status: COMPLETED | OUTPATIENT
Start: 2022-09-06 | End: 2022-09-06

## 2022-09-06 RX ORDER — ALPRAZOLAM 0.5 MG/1
0.5 TABLET ORAL 4 TIMES DAILY PRN
Status: DISCONTINUED | OUTPATIENT
Start: 2022-09-06 | End: 2022-09-07 | Stop reason: HOSPADM

## 2022-09-06 RX ORDER — LIDOCAINE HYDROCHLORIDE 10 MG/ML
INJECTION, SOLUTION EPIDURAL; INFILTRATION; INTRACAUDAL; PERINEURAL
Status: COMPLETED | OUTPATIENT
Start: 2022-09-06 | End: 2022-09-06

## 2022-09-06 RX ORDER — PREGABALIN 75 MG/1
75 CAPSULE ORAL DAILY
Status: DISCONTINUED | OUTPATIENT
Start: 2022-09-06 | End: 2022-09-07 | Stop reason: HOSPADM

## 2022-09-06 RX ORDER — ALPRAZOLAM 0.5 MG/1
0.5 TABLET ORAL 3 TIMES DAILY PRN
Status: DISCONTINUED | OUTPATIENT
Start: 2022-09-06 | End: 2022-09-06

## 2022-09-06 RX ORDER — LIDOCAINE HYDROCHLORIDE AND EPINEPHRINE BITARTRATE 20; .01 MG/ML; MG/ML
INJECTION, SOLUTION SUBCUTANEOUS
Status: COMPLETED | OUTPATIENT
Start: 2022-09-06 | End: 2022-09-06

## 2022-09-06 RX ORDER — HEPARIN SODIUM 5000 [USP'U]/ML
INJECTION, SOLUTION INTRAVENOUS; SUBCUTANEOUS
Status: COMPLETED | OUTPATIENT
Start: 2022-09-06 | End: 2022-09-06

## 2022-09-06 RX ORDER — DIAZEPAM 5 MG/ML
5 INJECTION, SOLUTION INTRAMUSCULAR; INTRAVENOUS ONCE
Status: COMPLETED | OUTPATIENT
Start: 2022-09-06 | End: 2022-09-06

## 2022-09-06 RX ADMIN — Medication 10 ML: at 21:18

## 2022-09-06 RX ADMIN — LIDOCAINE HYDROCHLORIDE 10 ML: 10 INJECTION, SOLUTION EPIDURAL; INFILTRATION; INTRACAUDAL; PERINEURAL at 14:44

## 2022-09-06 RX ADMIN — ACETAMINOPHEN 650 MG: 325 TABLET ORAL at 20:32

## 2022-09-06 RX ADMIN — TIOTROPIUM BROMIDE AND OLODATEROL 2 PUFF: 3.124; 2.736 SPRAY, METERED RESPIRATORY (INHALATION) at 08:12

## 2022-09-06 RX ADMIN — HEPARIN SODIUM 2.8 ML: 5000 INJECTION INTRAVENOUS; SUBCUTANEOUS at 14:46

## 2022-09-06 RX ADMIN — BUTALBITAL, ACETAMINOPHEN, AND CAFFEINE 1 TABLET: 50; 325; 40 TABLET ORAL at 21:19

## 2022-09-06 RX ADMIN — ASPIRIN 81 MG: 81 TABLET, COATED ORAL at 15:11

## 2022-09-06 RX ADMIN — ALPRAZOLAM 0.5 MG: 0.5 TABLET ORAL at 17:45

## 2022-09-06 RX ADMIN — PREGABALIN 75 MG: 75 CAPSULE ORAL at 21:19

## 2022-09-06 RX ADMIN — ALPRAZOLAM 0.5 MG: 0.5 TABLET ORAL at 20:33

## 2022-09-06 RX ADMIN — LIDOCAINE HYDROCHLORIDE,EPINEPHRINE BITARTRATE 10 ML: 20; .01 INJECTION, SOLUTION INFILTRATION; PERINEURAL at 14:44

## 2022-09-06 RX ADMIN — Medication 10 ML: at 08:14

## 2022-09-06 RX ADMIN — ALPRAZOLAM 0.5 MG: 0.5 TABLET ORAL at 08:44

## 2022-09-06 RX ADMIN — INSULIN LISPRO 2 UNITS: 100 INJECTION, SOLUTION INTRAVENOUS; SUBCUTANEOUS at 17:33

## 2022-09-06 RX ADMIN — ATORVASTATIN CALCIUM 40 MG: 40 TABLET, FILM COATED ORAL at 21:19

## 2022-09-06 RX ADMIN — DIAZEPAM 5 MG: 5 INJECTION, SOLUTION INTRAMUSCULAR; INTRAVENOUS at 14:31

## 2022-09-06 RX ADMIN — Medication 1 STICK: at 14:45

## 2022-09-06 RX ADMIN — INSULIN GLARGINE 10 UNITS: 100 INJECTION, SOLUTION SUBCUTANEOUS at 21:18

## 2022-09-06 RX ADMIN — FUROSEMIDE 80 MG: 40 TABLET ORAL at 15:11

## 2022-09-06 ASSESSMENT — PAIN SCALES - GENERAL
PAINLEVEL_OUTOF10: 0
PAINLEVEL_OUTOF10: 5
PAINLEVEL_OUTOF10: 0
PAINLEVEL_OUTOF10: 7
PAINLEVEL_OUTOF10: 0

## 2022-09-06 ASSESSMENT — PAIN DESCRIPTION - ONSET
ONSET: ON-GOING
ONSET: ON-GOING

## 2022-09-06 ASSESSMENT — PAIN DESCRIPTION - FREQUENCY
FREQUENCY: CONTINUOUS
FREQUENCY: CONTINUOUS

## 2022-09-06 ASSESSMENT — PAIN DESCRIPTION - DESCRIPTORS
DESCRIPTORS: ACHING
DESCRIPTORS: NUMBNESS;TINGLING;BURNING

## 2022-09-06 ASSESSMENT — PAIN DESCRIPTION - LOCATION
LOCATION: HEAD
LOCATION: FOOT

## 2022-09-06 ASSESSMENT — PAIN DESCRIPTION - PAIN TYPE
TYPE: CHRONIC PAIN
TYPE: CHRONIC PAIN

## 2022-09-06 ASSESSMENT — PAIN DESCRIPTION - ORIENTATION
ORIENTATION: RIGHT;LEFT
ORIENTATION: RIGHT;LEFT

## 2022-09-06 ASSESSMENT — PAIN - FUNCTIONAL ASSESSMENT
PAIN_FUNCTIONAL_ASSESSMENT: PREVENTS OR INTERFERES SOME ACTIVE ACTIVITIES AND ADLS
PAIN_FUNCTIONAL_ASSESSMENT: PREVENTS OR INTERFERES SOME ACTIVE ACTIVITIES AND ADLS

## 2022-09-06 NOTE — CONSULTS
MD Omar Hauser MD Shauna Bacon, MD                Office: [phone]                      Fax: [phone]          NEPHROLOGY INITIAL CONSULT NOTE:     PATIENT NAME: Criselda Diop  : 1975  MRN: [id number]  REASON FOR CONSULT: I am asked to see this patient in consultation for my opinion regarding management of ESRD. My recommendations will be communicated by way of shared medical record. IMPRESSION / RECOMMENDATION:      Admitted for:  Volume overload [E87.70]  Hemodialysis catheter dysfunction, initial encounter ([facility] [T82.41XA]  Presence of arteriovenous fistula for hemodialysis ([facility] [Z99.2]      1. ESRD on iHD: MWF ,   - outpt HD center: [facility], Dr Mali Mckeon   -   started HD in 2021, during admission w/ ATN w/ KINZA on CKD-4, was lost for f/u,), had acute hypoxic respiratory failure, pneumonia - needing intubation in past       - last HD 22    -consult vascular sx for [facility] exchange vs AVF intervention (no T/B, Rt arm swelling (+)     -HD today , as shortage of staff , to wait for [facility] exchange for exposed cuff today *      2. Hypertension/Volume Status:  - BP HTN - hx of resistant HTN -   uncontrolled - f/u after HD + resume home meds - as improving   - EDW reported 90 kg  -> 95 kg currently    - fluid overload - mild   - Na - chronic hypoNatremia - f/u w/ HD      3. Electrolytes/acid-base:  K: WNL  High AG metabolic acidosis: mild - f/u w/ HD - as better     4. Anemia of renal failure: below goal  - RIA: w/ HD  - check iron    5. BMD:  - Phos, calcium: follow iPTH outpt      : Other supportive care :   - Check daily renal function panel with electrolytes-phosphorus  - Strict monitoring of I/Os, daily weight  - Renal feeds/diet  - Current medications reviewed. - Nephrotoxic medications have been discontinued. - Dose adjusted and appropriate.       - Dose meds for eGFR <15 mL/min/1.73m2.    - Avoid heavy opioids due to renal failure - may use very low dose dilaudid / fentanyl with close monitoring of CNS and respiratory depression. Other Problems:  Hospital Problems             Last Modified POA    * (Principal) Volume overload 9/5/2022 Yes       Please refer to orders. Multiple complex problems. High risk  Discussed with patient, and treatment team  , HD nurse   Thank you for allowing me to participate in this patient's care. Please do not hesitate to contact me with any questions/concerns. We will follow along with you. Aren Rice MD  [facility] [address] [place]: [phone] or [employer]  Fax: [phone]    Time spent  ~ 35 minutes that included face-to-face meeting/discussion with patient, and treatment team (including primary/referring team and other consultants; included coordination of care with the treatment team; and review of patient's electronic medical records and ordering appropriates tests.       ===========================================  ===========================================      CHIEF COMPLAINT:   Other (States was sent in to have dialysis today and dialysis could not access fistula. Pt told to come to ER. )      History Obtained From:  patient + treatment team + Electronic Medical Records.      HPI: Ms. Jose Egan is a 52 y.o. female with significant past medical history of End stage renal disease, and   Past Medical History:   Diagnosis Date    Blind in both eyes     Cerebral artery occlusion with cerebral infarction ([address]     CHF (congestive heart failure) (HCC)     Chronic kidney disease     COPD (chronic obstructive pulmonary disease) ([address]     Depression     Diabetes mellitus out of control ([address]     Diabetes mellitus, type II ([address]     2005    Diabetic neuropathy associated with type 2 diabetes mellitus (HCC)     Generalized headaches     Hypertension     Infertility     Insomnia     chronic vs lack of time spent to sleep    Migraine headache 11/09/2011    Mixed hyperlipidemia     Otitis media     h/o recurrent    Pelvic abscess in female 10/05/2013    Pneumonia     2004 approx. Stroke [facility]) 08/27/2020    Stroke ([facility] 08/27/2021    ,   presents with Other (States was sent in to have dialysis today and dialysis could not access fistula. Pt told to come to ER. )  Admitted with Volume overload [E87.70]  Hemodialysis catheter dysfunction, initial encounter ([facility] [T82.41XA]  Presence of arteriovenous fistula for hemodialysis ([facility] [Z99.2]  We are called for ESRD care. W/ hx of CVA, w/ residual Rt weakness,   Does iHD MWF, said last HD last week       TDC cuff is exposed,   AV acess not working , as outpt,   Pt had SOB on Monday at HD center, after missing dialysis sessions, so was sent in.   - last HD 8-31-22    Re: ESRD  Duration (when): Chronic   Location (where): kidneys  Severity (ex: CKD stage): End stage  Timing (ex: continuous, intermittent): intermittent HD  Context (ex: related to condition):   DM / HTN related. Modifying factors (ex: medications, interventions): dialysis support  Associated signs & symptoms (ex: edema, SOB): Refer to HPI and Chief complaint    Past medical, surgical, social and family medial history reviewed by me:   PAST MEDICAL HISTORY:   Past Medical History:   Diagnosis Date    Blind in both eyes     Cerebral artery occlusion with cerebral infarction ([facility]     CHF (congestive heart failure) ([facility]     Chronic kidney disease     COPD (chronic obstructive pulmonary disease) ([address]     Depression     Diabetes mellitus out of control ([address]     Diabetes mellitus, type II ([address]     2005    Diabetic neuropathy associated with type 2 diabetes mellitus ([address]     Generalized headaches     Hypertension     Infertility     Insomnia     chronic vs lack of time spent to sleep    Migraine headache 11/09/2011    Mixed hyperlipidemia     Otitis media     h/o recurrent    Pelvic abscess in female 10/05/2013    Pneumonia     2004 approx. Stroke ([facility] 08/27/2020    Stroke ([facility] 08/27/2021     PAST SURGICAL HISTORY:   Past Surgical History:   Procedure Laterality Date    CERVIX SURGERY      laser tx for dysplasia;1992    DIALYSIS FISTULA CREATION Right 2/10/2022    RIGHT BRACHIO CEPHALIC FISTULA CREATION performed by Marvin Hernandez MD at [address] Right     FOOT SURGERY Bilateral     FOOT SURGERY Left     IR TUNNELED CATHETER PLACEMENT GREATER THAN 5 YEARS  9/7/2021    IR TUNNELED CATHETER PLACEMENT GREATER THAN 5 YEARS 9/7/2021 Geraldo Ponce MD [facility] SPECIAL PROCEDURES     FAMILY HISTORY:   Family History   Problem Relation Age of Onset    Diabetes Mother     Other Mother 79        Covid    Diabetes Father     High Blood Pressure Father     Colon Cancer Father     Diabetes Sister     Alcohol Abuse Maternal Grandfather     Diabetes Paternal Grandmother     Alcohol Abuse Paternal Grandfather     Diabetes Paternal Aunt     Diabetes Paternal Uncle      SOCIAL HISTORY:   Social History     Socioeconomic History    Marital status:      Spouse name: Priya Escoto    Number of children: 0    Years of education: None    Highest education level: None   Occupational History    Occupation: works as    Tobacco Use    Smoking status: Every Day     Packs/day: 1.00     Types: Cigarettes    Smokeless tobacco: Never    Tobacco comments:     Quit in August 2021- started back up   Vaping Use    Vaping Use: Never used   Substance and Sexual Activity    Alcohol use: No     Comment: Very Rare    Drug use: No    Sexual activity: Yes     Partners: Male          MEDICATIONS reviewed by me:  Prior to Admission Medications:  No current facility-administered medications on file prior to encounter.      Current Outpatient Medications on File Prior to Encounter   Medication Sig Dispense Refill    ALPRAZolam (ALPRAZOLAM XR) 2 MG extended release tablet TAKE ONE TABLET BY MOUTH EVERY MORNING 30 tablet 0    [employer] PEN NEEDLES 31G 31G X 8 MM MISC       midodrine (PROAMATINE) 10 MG tablet TAKE ONE TABLET BY MOUTH THREE TIMES A DAY 90 tablet 5    tiotropium-olodaterol (STIOLTO RESPIMAT) 2.5-2.5 MCG/ACT AERS Inhale 2 puffs into the lungs in the morning. 4 g 5    pregabalin (LYRICA) 75 MG capsule Take 1 capsule by mouth in the morning for 30 days. 30 capsule 2    insulin lispro, 1 Unit Dial, 100 UNIT/ML SOPN Inject 0-6 Units into the skin 3 times daily (with meals) Per sliding scale 3 pen 0    Dulaglutide (TRULICITY) 3 DV/5.6YC SOPN Inject 3 mg into the skin once a week 4 pen 2    insulin glargine (LANTUS;BASAGLAR) 100 UNIT/ML injection pen Inject 10 Units into the skin nightly 5 pen 3    albuterol sulfate HFA (PROVENTIL;VENTOLIN;PROAIR) 108 (90 Base) MCG/ACT inhaler Inhale 2 puffs into the lungs every 6 hours as needed for Wheezing or Shortness of Breath 18 g 1    Atogepant (QULIPTA) 10 MG TABS Take 10 mg by mouth daily 30 tablet 5    desvenlafaxine succinate (PRISTIQ) 25 MG TB24 extended release tablet Take 1 tablet by mouth in the morning. 30 tablet 1    furosemide (LASIX) 80 MG tablet Take 80 mg by mouth daily       Handicap Placard MISC by Does not apply route Expires 5/26/2027 1 each 0    atorvastatin (LIPITOR) 40 MG tablet Take 1 tablet by mouth nightly 30 tablet 11    aspirin 81 MG EC tablet Take 1 tablet by mouth daily 30 tablet 3       Medications Prior to Admission: ALPRAZolam (ALPRAZOLAM XR) 2 MG extended release tablet, TAKE ONE TABLET BY MOUTH EVERY MORNING  [employer] PEN NEEDLES 31G 31G X 8 MM MISC,   midodrine (PROAMATINE) 10 MG tablet, TAKE ONE TABLET BY MOUTH THREE TIMES A DAY  tiotropium-olodaterol (STIOLTO RESPIMAT) 2.5-2.5 MCG/ACT AERS, Inhale 2 puffs into the lungs in the morning. pregabalin (LYRICA) 75 MG capsule, Take 1 capsule by mouth in the morning for 30 days.   insulin lispro, 1 Unit Dial, 100 UNIT/ML SOPN, Inject 0-6 Units into the skin 3 times daily (with meals) Per sliding scale  Dulaglutide (TRULICITY) 3 OO/7.0AC SOPN, Inject 3 mg into the skin once a week  insulin glargine (LANTUS;BASAGLAR) 100 UNIT/ML injection pen, Inject 10 Units into the skin nightly  albuterol sulfate HFA (PROVENTIL;VENTOLIN;PROAIR) 108 (90 Base) MCG/ACT inhaler, Inhale 2 puffs into the lungs every 6 hours as needed for Wheezing or Shortness of Breath  Atogepant (QULIPTA) 10 MG TABS, Take 10 mg by mouth daily  desvenlafaxine succinate (PRISTIQ) 25 MG TB24 extended release tablet, Take 1 tablet by mouth in the morning. furosemide (LASIX) 80 MG tablet, Take 80 mg by mouth daily   Handicap Placard MISC, by Does not apply route Expires 5/26/2027  [DISCONTINUED] rizatriptan (MAXALT) 10 MG tablet, Take 1 tablet by mouth once as needed for Migraine May repeat in 2 hours if needed  atorvastatin (LIPITOR) 40 MG tablet, Take 1 tablet by mouth nightly  aspirin 81 MG EC tablet, Take 1 tablet by mouth daily     Inpatient Medications:  Scheduled Meds:   aspirin  81 mg Oral Daily    atorvastatin  40 mg Oral Nightly    furosemide  80 mg Oral Daily    insulin glargine  10 Units SubCUTAneous Nightly    tiotropium-olodaterol  2 puff Inhalation Daily    sodium chloride flush  5-40 mL IntraVENous 2 times per day    insulin lispro  0-4 Units SubCUTAneous TID WC    insulin lispro  0-4 Units SubCUTAneous Nightly     Continuous Infusions:   sodium chloride      dextrose       PRN Meds:. ALPRAZolam, albuterol sulfate HFA, midodrine, sodium chloride flush, sodium chloride, ondansetron **OR** ondansetron, polyethylene glycol, acetaminophen **OR** acetaminophen, dextrose bolus **OR** dextrose bolus, glucagon (rDNA), dextrose    Allergies: Amoxicillin, Levofloxacin, Vancomycin, and Tape [adhesive tape]    REVIEW OF SYSTEMS:  As mentioned in HPI and CC  All other 10-point review of systems: negative.             PHYSICAL EXAM:  Patient Vitals for the past 24 hrs:   BP Temp Temp src Pulse Resp SpO2 Height Weight   09/06/22 0813 -- -- -- (!) 103 16 97 % -- --   09/06/22 0809 121/83 98.3 °F (36.8 °C) Oral (!) 109 18 97 % -- --   09/06/22 0623 -- -- -- (!) 104 -- -- -- --   09/06/22 0422 (!) 158/80 98.2 °F (36.8 °C) Oral 100 17 95 % -- --   09/06/22 0229 -- -- -- 98 -- -- -- --   09/06/22 0016 (!) 154/90 98.2 °F (36.8 °C) Oral 94 18 96 % -- 210 lb (95.3 kg)   09/05/22 2328 (!) 162/94 -- -- -- -- -- -- --   09/05/22 2323 (!) 170/98 -- -- -- -- -- -- --   09/05/22 2316 (!) 200/103 -- -- -- -- -- -- --   09/05/22 2221 (!) 180/98 -- -- 95 -- -- -- --   09/05/22 2103 (!) 190/94 -- -- -- -- -- -- --   09/05/22 2027 (!) 189/97 98.2 °F (36.8 °C) Oral 94 18 95 % -- --   09/05/22 1857 -- -- -- -- -- -- 5' 7\" (1.702 m) 198 lb (89.8 kg)   09/05/22 1807 125/75 98.2 °F (36.8 °C) Oral 94 18 100 % -- --   09/05/22 1745 (!) 187/90 -- -- -- -- 94 % -- --   09/05/22 1645 (!) 199/89 -- -- 96 -- 92 % -- --   09/05/22 1629 (!) 200/92 -- -- -- -- 94 % -- --   09/05/22 1614 (!) 179/94 -- -- -- -- 97 % -- --   09/05/22 1419 (!) 187/93 98.5 °F (36.9 °C) Oral (!) 105 18 98 % -- --     No intake or output data in the 24 hours ending 09/06/22 8467       Physical Exam  Vitals reviewed. Constitutional:       General: She is not in acute distress. Appearance: Normal appearance. She is ill-appearing. Comments: Obese body habitus   HENT:      Head: Normocephalic and atraumatic. Right Ear: External ear normal.      Left Ear: External ear normal.      Nose: Nose normal.      Mouth/Throat:      Mouth: Mucous membranes are moist. Mucous membranes are not dry. Eyes:      General: No scleral icterus. Conjunctiva/sclera: Conjunctivae normal.   Neck:      Thyroid: No thyroid mass. Vascular: No JVD. Trachea: Trachea normal.   Cardiovascular:      Rate and Rhythm: Normal rate and regular rhythm. Heart sounds: S1 normal and S2 normal.   Pulmonary:      Effort: Pulmonary effort is normal. No respiratory distress. Breath sounds: Normal breath sounds.    Abdominal:      General: Bowel sounds are normal. There is no distension. Palpations: Abdomen is soft. Musculoskeletal:         General: Swelling (mild) present. No deformity. Cervical back: Normal range of motion and neck supple. Skin:     General: Skin is warm and dry. Coloration: Skin is not jaundiced. Neurological:      Mental Status: She is alert and oriented to person, place, and time. Mental status is at baseline. Psychiatric:         Mood and Affect: Mood normal.         Behavior: Behavior normal.         DATA:  Diagnostic tests reviewed for today's visit:    Recent Labs     09/05/22  1513   WBC 12.0*   HCT 28.3*        Iron Saturation:  No components found for: PERCENTFE  FERRITIN:    Lab Results   Component Value Date/Time    FERRITIN 112.0 08/28/2021 04:20 AM     IRON:    Lab Results   Component Value Date/Time    IRON 36 01/04/2022 01:09 PM     TIBC:    Lab Results   Component Value Date/Time    TIBC 299 01/04/2022 01:09 PM       Recent Labs     09/05/22  1513   *   K 4.0   CL 96*   CO2 21   BUN 72*   CREATININE 10.3*     Recent Labs     09/05/22  1513   CALCIUM 8.5     No results for input(s): PH, PCO2, PO2 in the last 72 hours. Invalid input(s): Cloretta Kehr    ABG:  No results found for: PH, PCO2, PO2, HCO3, BE, THGB, TCO2, O2SAT  VBG:    Lab Results   Component Value Date/Time    PHVEN 7.302 11/02/2021 11:37 PM    BAR1EIG 60.7 11/02/2021 11:37 PM    BEVEN 2.6 11/02/2021 11:37 PM    A9ADGTKU 100 11/02/2021 11:37 PM           BELOW MENTIONED RADIOLOGY STUDY RESULTS REVIEWED BY ME:          XR CHEST PORTABLE     Narrative   EXAMINATION:   ONE XRAY VIEW OF THE CHEST       9/2/2022 3:40 pm       COMPARISON:   January 4, 2022       HISTORY:   ORDERING SYSTEM PROVIDED HISTORY: missed dialysis   TECHNOLOGIST PROVIDED HISTORY:   Reason for exam:->missed dialysis   Reason for Exam: missed dialysis       FINDINGS:   The cardiomediastinal silhouette is not enlarged.   No pleural effusion or

## 2022-09-06 NOTE — BRIEF OP NOTE
Brief Postoperative Note    Burton Gonzalez  YOB: 1975  [id number]      Pre-operative Diagnosis: Retracted tunneled HD catheter    Post-operative Diagnosis: Same    Procedure: Tunneled HD catheter exchange    Anesthesia: Local    Surgeons/Assistants: Rigoberto Meeks DO    Estimated Blood Loss: less than 50     Complications: None    Specimens: Was Not Obtained    Findings:   Successful right IJ tunneled HD catheter exchange. Patient now currently has a 19 cm tip to cuff tunneled HD catheter, ready for immediate use.        Rigoberto Meeks DO  9/6/2022, 2:54 PM  Interventional Radiology  [phone] ([id number])

## 2022-09-06 NOTE — PROGRESS NOTES
[facility] PROGRESS NOTE    9/6/2022 8:51 AM        Name: Ailyn Gomez . Admitted: 9/5/2022  Primary Care Provider: Basil Macias MD (Tel: [phone])                        Subjective:  . No acute events overnight. Resting well. Pain control. Diet ok. Labs reviewed  Denies any chest pain sob.      Reviewed interval ancillary notes    Current Medications  ALPRAZolam (XANAX) tablet 0.5 mg, TID PRN  albuterol sulfate HFA (PROVENTIL;VENTOLIN;PROAIR) 108 (90 Base) MCG/ACT inhaler 2 puff, Q6H PRN  aspirin EC tablet 81 mg, Daily  atorvastatin (LIPITOR) tablet 40 mg, Nightly  furosemide (LASIX) tablet 80 mg, Daily  insulin glargine (LANTUS) injection vial 10 Units, Nightly  midodrine (PROAMATINE) tablet 10 mg, TID PRN  tiotropium-olodaterol (STIOLTO) 2.5-2.5 MCG/ACT inhaler 2 puff, Daily  sodium chloride flush 0.9 % injection 5-40 mL, 2 times per day  sodium chloride flush 0.9 % injection 5-40 mL, PRN  0.9 % sodium chloride infusion, PRN  ondansetron (ZOFRAN-ODT) disintegrating tablet 4 mg, Q8H PRN   Or  ondansetron (ZOFRAN) injection 4 mg, Q6H PRN  polyethylene glycol (GLYCOLAX) packet 17 g, Daily PRN  acetaminophen (TYLENOL) tablet 650 mg, Q6H PRN   Or  acetaminophen (TYLENOL) suppository 650 mg, Q6H PRN  dextrose bolus 10% 125 mL, PRN   Or  dextrose bolus 10% 250 mL, PRN  glucagon (rDNA) injection 1 mg, PRN  dextrose 10 % infusion, Continuous PRN  insulin lispro (HUMALOG) injection vial 0-4 Units, TID WC  insulin lispro (HUMALOG) injection vial 0-4 Units, Nightly        Objective:  /83   Pulse (!) 103   Temp 98.3 °F (36.8 °C) (Oral)   Resp 16   Ht 5' 7\" (1.702 m)   Wt 210 lb (95.3 kg)   LMP 09/01/2022   SpO2 97%   BMI 32.89 kg/m²   No intake or output data in the 24 hours ending 09/06/22 0851   Wt Readings from Last 3 Encounters:   09/06/22 210 lb (95.3 kg)   09/02/22 207 lb (93.9 kg)   08/25/22 198 lb (89.8 kg)       General appearance:  Appears comfortable  Eyes: Sclera clear. Pupils equal.  ENT: Moist oral mucosa. Trachea midline, no adenopathy. Cardiovascular: Regular rhythm, normal S1, S2. No murmur. No edema in lower extremities  Respiratory: Not using accessory muscles. Good inspiratory effort. Clear to auscultation bilaterally, no wheeze or crackles. GI: Abdomen soft, no tenderness, not distended, normal bowel sounds  Musculoskeletal: No cyanosis in digits, neck supple  Neurology: CN 2-12 grossly intact. No speech or motor deficits  Psych: Normal affect. Alert and oriented in time, place and person  Skin: Warm, dry, normal turgor    Labs and Tests:  CBC:   Recent Labs     09/05/22  1513   WBC 12.0*   HGB 9.3*        BMP:    Recent Labs     09/05/22  1513   *   K 4.0   CL 96*   CO2 21   BUN 72*   CREATININE 10.3*   GLUCOSE 175*     Hepatic:   Recent Labs     09/05/22  1513   AST 15   ALT 6*   BILITOT 0.3   ALKPHOS 137*       Discussed care with patient             Problem List  Principal Problem:    Volume overload  Resolved Problems:    * No resolved hospital problems. *       Assessment & Plan:   Dialysis catheter malfunction  -Last dialysis was Wednesday  -Nephrology consulted potassium is still  -Further recommendation to follow    Anxiety  -Continue Ativan as needed      Diet: ADULT DIET; Regular; Low Potassium (Less than 3000 mg/day);  Low Phosphorus (Less than 1000 mg)  Code:Full Code  DVT PPX lovenox       Sagrario Knight MD   9/6/2022 8:51 AM

## 2022-09-06 NOTE — CARE COORDINATION
Discharge Planning Assessment  Rn/SW discharge planner met w/patient/family member to discuss reason for admission, current living situation, and potential needs at the time of discharge. Demographics/Insurance verified Yes    Current type of dwelling:home    Living arrangements:w/spouse    Level of function/support:independent w/ADL's    PCP:[facility]    DME: uses a walker in the community-no DME's used in the home    Active with any community resources/agencies/skilled home care: stated none    Medication compliance issues:stated no issues    Financial issues that could impact healthcare:stated no issues    Transportation at time of d/c (Discussed w/pt/family that on the day of discharge home, tentative time of discharge will be between 10am and noon): spouse    Tentative discharge plan: home w/spouse-no needs anticipated.     Electronically signed by LEYDI Trevino  [phone]

## 2022-09-06 NOTE — CONSULTS
[facility]  Consultation Note    Chief Complaint / Reason for Consultation  AVF not working     History of Present Illness  Patient is a 52 y.o. female with past medical history of CHF, COPD, ESRD on HD, HTN, DM, HLD, CVA and blindness who presented to the ED with complaints of non functional right arm AVF with last dialysis being Wednesday. Patient has a history of right brachiocephalic AVF placed in February of this year with Dr. Radha Galindo. She was evaluate in office on 7/5 and had the okay to use AVF. Patient reports since then they have had issues accessing her AVF. She reports the bottom stick works well but the top stick usually pulls clots. She does still have a right chest wall tunneled dialysis catheter and will usually use one line on the catheter and one in the arm. She also has right arm swelling since they have been accessing her AVF. Her TDC has part of the cuff exposed which is why she was sent to the ED for evaluation. We have been consulted for further evaluation of AVF. Review of Systems   + right arm swelling. Denies fevers, chills, chest pain, shortness of breath, nausea, vomiting, hematemesis, diarrhea, constipation, melena, hematochezia, wt changes, vision problems, blindness, hearing problems, facial droop, slurred speech, extremity weakness, extremity numbness, dysuria.     Past Medical History:   Diagnosis Date    Blind in both eyes     Cerebral artery occlusion with cerebral infarction ([address]     CHF (congestive heart failure) (HCC)     Chronic kidney disease     COPD (chronic obstructive pulmonary disease) ([address]     Depression     Diabetes mellitus out of control ([address]     Diabetes mellitus, type II ([address]     2005    Diabetic neuropathy associated with type 2 diabetes mellitus (HCC)     Generalized headaches     Hypertension     Infertility     Insomnia     chronic vs lack of time spent to sleep    Migraine headache 11/09/2011    Mixed hyperlipidemia file   Intimate Partner Violence: Not on file   Housing Stability: Not on file       Family History   Problem Relation Age of Onset    Diabetes Mother     Other Mother 79        Covid    Diabetes Father     High Blood Pressure Father     Colon Cancer Father     Diabetes Sister     Alcohol Abuse Maternal Grandfather     Diabetes Paternal Grandmother     Alcohol Abuse Paternal Grandfather     Diabetes Paternal Aunt     Diabetes Paternal Uncle      - No history of bleeding or clotting disorders    Vital Signs  Vitals:    09/06/22 0623 09/06/22 0809 09/06/22 0813 09/06/22 0915   BP:  121/83  (!) 180/75   Pulse: (!) 104 (!) 109 (!) 103 (!) 103   Resp:  18 16    Temp:  98.3 °F (36.8 °C)  97.5 °F (36.4 °C)   TempSrc:  Oral     SpO2:  97% 97%    Weight:    212 lb 11.9 oz (96.5 kg)   Height:           Physical Examination  General: no apparent distress, appears stated age  Psychiatric: affect appropriate  Head/Eyes/Ears/Nose/Throat:  Normocephalic, atraumatic, PERRLA, face symmetric  Neck:  no adenopathy, no carotid bruit, no JVD, supple, symmetrical, trachea midline, thyroid not enlarged, no tenderness/mass/nodules  Chest/Lungs: clear to auscultation bilaterally, no accessory muscle use  Cardiac:  regular rate and rhythm, S1, S2 normal, no murmur, click, rub or gallop  Abdomen: soft, nontender, active bowel sounds  Extremities: warm and well perfused, no signs of cyanosis or ischemia, bilateral upper and lower extremity motorsensory intact  Vascular exam:  - R radial: 1+  - L radial: 2+  Right brachiocephalic AVF + thrill, right arm swelling 1+    Labs  Lab Results   Component Value Date/Time    WBC 12.6 09/06/2022 09:15 AM    HGB 8.1 09/06/2022 09:15 AM    HCT 24.5 09/06/2022 09:15 AM    MCV 93.0 09/06/2022 09:15 AM     09/06/2022 09:15 AM     Lab Results   Component Value Date/Time     09/06/2022 09:15 AM    K 4.4 09/06/2022 09:15 AM    K 4.0 09/05/2022 03:13 PM    CL 96 09/06/2022 09:15 AM    CO2 21 09/06/2022 09:15 AM    PHOS 8.6 09/06/2022 09:15 AM    BUN 80 09/06/2022 09:15 AM    CREATININE 10.5 09/06/2022 09:15 AM      No results found for: GLU    Scheduled Meds:    aspirin  81 mg Oral Daily    atorvastatin  40 mg Oral Nightly    furosemide  80 mg Oral Daily    insulin glargine  10 Units SubCUTAneous Nightly    tiotropium-olodaterol  2 puff Inhalation Daily    sodium chloride flush  5-40 mL IntraVENous 2 times per day    insulin lispro  0-4 Units SubCUTAneous TID WC    insulin lispro  0-4 Units SubCUTAneous Nightly     Continuous Infusions:    sodium chloride      dextrose         Assessment:   Right brachiocephalic AVF dysfunction  Right arm swelling  ESRD on HD     Plan: Will ask IR to exchange TDC as cuff is exposed. Can then continue to use [facility] for dialysis. Will have patient follow up with Dr. Jus Burrell as OP for non functioning right brachiocephalic AVF. Once dialysis catheter is exchanged and working okay from a vascular standpoint okay to discharge. Will discuss with Dr. Gabriella Thompson and any further recommendations to follow. Thank you for the consultation. Patient educated on plan of care and disease process. All questions answered.         Electronically signed by RIVERA Mann CNP on 9/6/2022 at 11:53 AM

## 2022-09-06 NOTE — PROGRESS NOTES
MD Omar Hauser MD Shauna Bacon, MD                Office: [phone]                      Fax: [phone]          Inpatient Dialysis Progress Note:     PATIENT NAME: Criselda Diop  : 1975  MRN: [id number]    Indication for Dialysis: ESRD   Patient seen on dialysis treatment. Tolerating treatment  Fairly well    Vitals:    22 0915   BP: (!) 180/75   Pulse: (!) 103   Resp:    Temp: 97.5 °F (36.4 °C)   SpO2:        Awake, co-operative   Neck: Supple. no JVD. Cardiac: S1 and S2+, No pericardial rub. Chest: Normal respiratory effort,  Rales. No rhonchi  Ext :  LE Edema , no cynosis     Labs Reviewed  by me   Labs   Lab Results   Component Value Date    CREATININE 10.5 (HH) 2022    BUN 80 (H) 2022     2022    K 4.4 2022    CL 96 (L) 2022    CO2 21 2022     Lab Results   Component Value Date    WBC 12.6 (H) 2022    HGB 8.1 (L) 2022    HCT 24.5 (L) 2022    MCV 93.0 2022     2022       Dialysis Treatment and Prescription reviewed    RX:  See dialysis flowsheet for specifics on access, blood flow rate, dialysate baths, duration of dialysis, anticoagulation and other technical information. COMMENTS:  Stable on dialysis. Continue to Target dry weight and clearance. Monitor closely for any hypotension    : Tolerating dialysis well, with no complications. Hypotension on dialysis-stable to improved. Good flow access. On TDC     :Anemia. Stable. Continue Aransep. :Fluid Overload. Uncontrolled   Fluid removal as tolerated with dialysis.  : Not ready for discharge. Please refer to the orders. Dialysis treatment plan and dialysis orders discussed with dialysis RN   at bedside. Discussed with Patient. Thank you for allowing me to participate in this patient's care. Please do not hesitate to contact me for any questions/concerns.  We will follow along with you.     Fletcher Minor MD       [facility] [address] [place] [address]   Office: [phone] or [address] [employer]  Fax: [phone]

## 2022-09-06 NOTE — RT PROTOCOL NOTE
RT Inhaler-Nebulizer Bronchodilator Protocol Note    There is a bronchodilator order in the chart from a provider indicating to follow the RT Bronchodilator Protocol and there is an Initiate RT Inhaler-Nebulizer Bronchodilator Protocol order as well (see protocol at bottom of note). CXR Findings:  No results found. The findings from the last RT Protocol Assessment were as follows:   History Pulmonary Disease: Chronic pulmonary disease  Respiratory Pattern: Regular pattern and RR 12-20 bpm  Breath Sounds: Clear breath sounds  Cough: Strong, spontaneous, non-productive  Indication for Bronchodilator Therapy: On home bronchodilators  Bronchodilator Assessment Score: 2    Aerosolized bronchodilator medication orders have been revised according to the RT Inhaler-Nebulizer Bronchodilator Protocol below. Respiratory Therapist to perform RT Therapy Protocol Assessment initially then follow the protocol. Repeat RT Therapy Protocol Assessment PRN for score 0-3 or on second treatment, BID, and PRN for scores above 3. No Indications - adjust the frequency to every 6 hours PRN wheezing or bronchospasm, if no treatments needed after 48 hours then discontinue using Per Protocol order mode. If indication present, adjust the RT bronchodilator orders based on the Bronchodilator Assessment Score as indicated below. Use Inhaler orders unless patient has one or more of the following: on home nebulizer, not able to hold breath for 10 seconds, is not alert and oriented, cannot activate and use MDI correctly, or respiratory rate 25 breaths per minute or more, then use the equivalent nebulizer order(s) with same Frequency and PRN reasons based on the score. If a patient is on this medication at home then do not decrease Frequency below that used at home.     0-3 - enter or revise RT bronchodilator order(s) to equivalent RT Bronchodilator order with Frequency of every 4 hours PRN for wheezing or increased work of breathing using Per Protocol order mode. 4-6 - enter or revise RT Bronchodilator order(s) to two equivalent RT bronchodilator orders with one order with BID Frequency and one order with Frequency of every 4 hours PRN wheezing or increased work of breathing using Per Protocol order mode. 7-10 - enter or revise RT Bronchodilator order(s) to two equivalent RT bronchodilator orders with one order with TID Frequency and one order with Frequency of every 4 hours PRN wheezing or increased work of breathing using Per Protocol order mode. 11-13 - enter or revise RT Bronchodilator order(s) to one equivalent RT bronchodilator order with QID Frequency and an Albuterol order with Frequency of every 4 hours PRN wheezing or increased work of breathing using Per Protocol order mode. Greater than 13 - enter or revise RT Bronchodilator order(s) to one equivalent RT bronchodilator order with every 4 hours Frequency and an Albuterol order with Frequency of every 2 hours PRN wheezing or increased work of breathing using Per Protocol order mode. RT to enter RT Home Evaluation for COPD & MDI Assessment order using Per Protocol order mode.     Electronically signed by Israel Seo RCP on 9/5/2022 at 9:34 PM

## 2022-09-06 NOTE — PROGRESS NOTES
09/05/22 2134   RT Protocol   History Pulmonary Disease 2   Respiratory pattern 0   Breath sounds 0   Cough 0   Indications for Bronchodilator Therapy On home bronchodilators   Bronchodilator Assessment Score 2

## 2022-09-07 VITALS
HEART RATE: 101 BPM | TEMPERATURE: 98.7 F | RESPIRATION RATE: 16 BRPM | WEIGHT: 199.3 LBS | SYSTOLIC BLOOD PRESSURE: 111 MMHG | BODY MASS INDEX: 31.28 KG/M2 | HEIGHT: 67 IN | DIASTOLIC BLOOD PRESSURE: 75 MMHG | OXYGEN SATURATION: 95 %

## 2022-09-07 LAB
ALBUMIN SERPL-MCNC: 3.1 G/DL (ref 3.4–5)
ANION GAP SERPL CALCULATED.3IONS-SCNC: 17 MMOL/L (ref 3–16)
BASOPHILS ABSOLUTE: 0 K/UL (ref 0–0.2)
BASOPHILS RELATIVE PERCENT: 0.4 %
BUN BLDV-MCNC: 55 MG/DL (ref 7–20)
CALCIUM SERPL-MCNC: 8.7 MG/DL (ref 8.3–10.6)
CHLORIDE BLD-SCNC: 97 MMOL/L (ref 99–110)
CO2: 22 MMOL/L (ref 21–32)
CREAT SERPL-MCNC: 8.1 MG/DL (ref 0.6–1.1)
EOSINOPHILS ABSOLUTE: 0.6 K/UL (ref 0–0.6)
EOSINOPHILS RELATIVE PERCENT: 5.6 %
GFR AFRICAN AMERICAN: 6
GFR NON-AFRICAN AMERICAN: 5
GLUCOSE BLD-MCNC: 110 MG/DL (ref 70–99)
GLUCOSE BLD-MCNC: 122 MG/DL (ref 70–99)
GLUCOSE BLD-MCNC: 139 MG/DL (ref 70–99)
HBV SURFACE AB TITR SER: 20.75 MIU/ML
HCT VFR BLD CALC: 26.4 % (ref 36–48)
HEMOGLOBIN: 8.7 G/DL (ref 12–16)
HEPATITIS B SURFACE ANTIGEN INTERPRETATION: NORMAL
LYMPHOCYTES ABSOLUTE: 2.7 K/UL (ref 1–5.1)
LYMPHOCYTES RELATIVE PERCENT: 24.5 %
MCH RBC QN AUTO: 30.6 PG (ref 26–34)
MCHC RBC AUTO-ENTMCNC: 33.1 G/DL (ref 31–36)
MCV RBC AUTO: 92.4 FL (ref 80–100)
MONOCYTES ABSOLUTE: 0.9 K/UL (ref 0–1.3)
MONOCYTES RELATIVE PERCENT: 8.3 %
NEUTROPHILS ABSOLUTE: 6.8 K/UL (ref 1.7–7.7)
NEUTROPHILS RELATIVE PERCENT: 61.2 %
PDW BLD-RTO: 17 % (ref 12.4–15.4)
PERFORMED ON: ABNORMAL
PERFORMED ON: ABNORMAL
PHOSPHORUS: 6 MG/DL (ref 2.5–4.9)
PLATELET # BLD: 198 K/UL (ref 135–450)
PMV BLD AUTO: 8.2 FL (ref 5–10.5)
POTASSIUM SERPL-SCNC: 4.2 MMOL/L (ref 3.5–5.1)
RBC # BLD: 2.85 M/UL (ref 4–5.2)
SODIUM BLD-SCNC: 136 MMOL/L (ref 136–145)
WBC # BLD: 11.1 K/UL (ref 4–11)

## 2022-09-07 PROCEDURE — 6370000000 HC RX 637 (ALT 250 FOR IP): Performed by: HOSPITALIST

## 2022-09-07 PROCEDURE — 2580000003 HC RX 258: Performed by: HOSPITALIST

## 2022-09-07 PROCEDURE — 87340 HEPATITIS B SURFACE AG IA: CPT

## 2022-09-07 PROCEDURE — 6360000002 HC RX W HCPCS: Performed by: INTERNAL MEDICINE

## 2022-09-07 PROCEDURE — 94760 N-INVAS EAR/PLS OXIMETRY 1: CPT

## 2022-09-07 PROCEDURE — 90935 HEMODIALYSIS ONE EVALUATION: CPT

## 2022-09-07 PROCEDURE — 85025 COMPLETE CBC W/AUTO DIFF WBC: CPT

## 2022-09-07 PROCEDURE — 6360000002 HC RX W HCPCS: Performed by: HOSPITALIST

## 2022-09-07 PROCEDURE — 80069 RENAL FUNCTION PANEL: CPT

## 2022-09-07 PROCEDURE — 94640 AIRWAY INHALATION TREATMENT: CPT

## 2022-09-07 PROCEDURE — 86706 HEP B SURFACE ANTIBODY: CPT

## 2022-09-07 PROCEDURE — 36415 COLL VENOUS BLD VENIPUNCTURE: CPT

## 2022-09-07 RX ORDER — HEPARIN SODIUM 1000 [USP'U]/ML
3200 INJECTION, SOLUTION INTRAVENOUS; SUBCUTANEOUS PRN
Status: DISCONTINUED | OUTPATIENT
Start: 2022-09-07 | End: 2022-09-07 | Stop reason: HOSPADM

## 2022-09-07 RX ADMIN — TIOTROPIUM BROMIDE AND OLODATEROL 2 PUFF: 3.124; 2.736 SPRAY, METERED RESPIRATORY (INHALATION) at 12:46

## 2022-09-07 RX ADMIN — ONDANSETRON 4 MG: 2 INJECTION INTRAMUSCULAR; INTRAVENOUS at 11:54

## 2022-09-07 RX ADMIN — PREGABALIN 75 MG: 75 CAPSULE ORAL at 12:43

## 2022-09-07 RX ADMIN — Medication 10 ML: at 12:43

## 2022-09-07 RX ADMIN — FUROSEMIDE 80 MG: 40 TABLET ORAL at 12:42

## 2022-09-07 RX ADMIN — ASPIRIN 81 MG: 81 TABLET, COATED ORAL at 12:43

## 2022-09-07 RX ADMIN — ALPRAZOLAM 0.5 MG: 0.5 TABLET ORAL at 12:42

## 2022-09-07 RX ADMIN — EPOETIN ALFA-EPBX 10000 UNITS: 10000 INJECTION, SOLUTION INTRAVENOUS; SUBCUTANEOUS at 11:07

## 2022-09-07 ASSESSMENT — PAIN SCALES - GENERAL
PAINLEVEL_OUTOF10: 0

## 2022-09-07 NOTE — PLAN OF CARE
Problem: Discharge Planning  Goal: Discharge to home or other facility with appropriate resources  9/7/2022 1404 by Mary Reno RN  Outcome: Completed  Flowsheets (Taken 9/7/2022 9861)  Discharge to home or other facility with appropriate resources:   Identify barriers to discharge with patient and caregiver   Arrange for needed discharge resources and transportation as appropriate   Identify discharge learning needs (meds, wound care, etc)   Arrange for interpreters to assist at discharge as needed   Refer to discharge planning if patient needs post-hospital services based on physician order or complex needs related to functional status, cognitive ability or social support system  9/7/2022 0432 by Milli Alaniz RN  Outcome: Progressing     Problem: Pain  Goal: Verbalizes/displays adequate comfort level or baseline comfort level  9/7/2022 1404 by Mary Reno RN  Outcome: Completed  Flowsheets (Taken 9/7/2022 0756)  Verbalizes/displays adequate comfort level or baseline comfort level:   Encourage patient to monitor pain and request assistance   Assess pain using appropriate pain scale   Administer analgesics based on type and severity of pain and evaluate response   Implement non-pharmacological measures as appropriate and evaluate response  9/7/2022 0432 by Milli Alaniz RN  Outcome: Progressing     Problem: Safety - Adult  Goal: Free from fall injury  9/7/2022 1404 by Mary Reno RN  Outcome: Completed  Flowsheets (Taken 9/7/2022 0846)  Free From Fall Injury:   Instruct family/caregiver on patient safety   Based on caregiver fall risk screen, instruct family/caregiver to ask for assistance with transferring infant if caregiver noted to have fall risk factors  9/7/2022 0432 by Milli Alaniz RN  Outcome: Progressing     Problem: Chronic Conditions and Co-morbidities  Goal: Patient's chronic conditions and co-morbidity symptoms are monitored and maintained or improved  9/7/2022 [address] [place] [address] by [place] Elizabeth, HAYLEE  Outcome: Completed  Flowsheets (Taken 9/7/2022 0756)  Care Plan - Patient's Chronic Conditions and Co-Morbidity Symptoms are Monitored and Maintained or Improved:   Collaborate with multidisciplinary team to address chronic and comorbid conditions and prevent exacerbation or deterioration   Monitor and assess patient's chronic conditions and comorbid symptoms for stability, deterioration, or improvement   Update acute care plan with appropriate goals if chronic or comorbid symptoms are exacerbated and prevent overall improvement and discharge  9/7/2022 0432 by Fernando Chilel RN  Outcome: Progressing     Problem: ABCDS Injury Assessment  Goal: Absence of physical injury  Outcome: Completed  Flowsheets (Taken 9/7/2022 0756)  Absence of Physical Injury: Implement safety measures based on patient assessment     Problem: Neurosensory - Adult  Goal: Achieves stable or improved neurological status  Outcome: Completed  Flowsheets (Taken 9/7/2022 0756)  Achieves stable or improved neurological status:   Assess for and report changes in neurological status   Initiate measures to prevent increased intracranial pressure   Maintain blood pressure and fluid volume within ordered parameters to optimize cerebral perfusion and minimize risk of hemorrhage   Monitor temperature, glucose, and sodium.  Initiate appropriate interventions as ordered     Problem: Respiratory - Adult  Goal: Achieves optimal ventilation and oxygenation  Outcome: Completed  Flowsheets (Taken 9/7/2022 0756)  Achieves optimal ventilation and oxygenation:   Assess for changes in respiratory status   Assess for changes in mentation and behavior   Position to facilitate oxygenation and minimize respiratory effort   Oxygen supplementation based on oxygen saturation or arterial blood gases   Respiratory therapy support as indicated     Problem: Cardiovascular - Adult  Goal: Maintains optimal cardiac output and hemodynamic patient handling equipment as needed   Ensure adequate protection for wounds/incisions during mobilization   Obtain physical therapy/occupational therapy consults as needed  Goal: Maintain proper alignment of affected body part  Outcome: Completed  Flowsheets (Taken 9/7/2022 0756)  Maintain proper alignment of affected body part:   Support and protect limb and body alignment per provider's orders   Instruct and reinforce with patient and family use of appropriate assistive device and precautions (e.g. spinal or hip dislocation precautions)  Goal: Return ADL status to a safe level of function  Outcome: Completed  Flowsheets (Taken 9/7/2022 0756)  Return ADL Status to a Safe Level of Function:   Administer medication as ordered   Assess activities of daily living deficits and provide assistive devices as needed   Obtain physical therapy/occupational therapy consults as needed   Assist and instruct patient to increase activity and self care as tolerated     Problem: Gastrointestinal - Adult  Goal: Minimal or absence of nausea and vomiting  Outcome: Completed  Flowsheets (Taken 9/7/2022 0756)  Minimal or absence of nausea and vomiting:   Administer IV fluids as ordered to ensure adequate hydration   Administer ordered antiemetic medications as needed   Provide nonpharmacologic comfort measures as appropriate  Goal: Maintains or returns to baseline bowel function  Outcome: Completed  Flowsheets (Taken 9/7/2022 0756)  Maintains or returns to baseline bowel function:   Assess bowel function   Encourage oral fluids to ensure adequate hydration   Administer ordered medications as needed   Encourage mobilization and activity  Goal: Maintains adequate nutritional intake  Outcome: Completed  Flowsheets (Taken 9/7/2022 0756)  Maintains adequate nutritional intake:   Monitor percentage of each meal consumed   Identify factors contributing to decreased intake, treat as appropriate   Assist with meals as needed   Monitor intake and output, weight and lab values     Problem: Genitourinary - Adult  Goal: Absence of urinary retention  Outcome: Completed  Flowsheets (Taken 9/7/2022 0756)  Absence of urinary retention:   Assess patients ability to void and empty bladder   Monitor intake/output and perform bladder scan as needed     Problem: Infection - Adult  Goal: Absence of infection at discharge  Outcome: Completed  Flowsheets (Taken 9/7/2022 0756)  Absence of infection at discharge:   Assess and monitor for signs and symptoms of infection   Monitor lab/diagnostic results   Administer medications as ordered   Instruct and encourage patient and family to use good hand hygiene technique   Monitor all insertion sites i.e., indwelling lines, tubes and drains  Goal: Absence of infection during hospitalization  Outcome: Completed  Flowsheets (Taken 9/7/2022 0756)  Absence of infection during hospitalization:   Assess and monitor for signs and symptoms of infection   Monitor lab/diagnostic results   Monitor all insertion sites i.e., indwelling lines, tubes and drains   Administer medications as ordered   Instruct and encourage patient and family to use good hand hygiene technique  Goal: Absence of fever/infection during anticipated neutropenic period  Outcome: Completed  Flowsheets (Taken 9/7/2022 0756)  Absence of fever/infection during anticipated neutropenic period: Monitor white blood cell count     Problem: Metabolic/Fluid and Electrolytes - Adult  Goal: Electrolytes maintained within normal limits  Outcome: Completed  Flowsheets (Taken 9/7/2022 0756)  Electrolytes maintained within normal limits:   Monitor labs and assess patient for signs and symptoms of electrolyte imbalances   Administer electrolyte replacement as ordered   Monitor response to electrolyte replacements, including repeat lab results as appropriate   Fluid restriction as ordered  Goal: Hemodynamic stability and optimal renal function maintained  Outcome: Completed  Flowsheets (Taken 9/7/2022 0756)  Hemodynamic stability and optimal renal function maintained:   Monitor labs and assess for signs and symptoms of volume excess or deficit   Monitor intake, output and patient weight   Monitor urine specific gravity, serum osmolarity and serum sodium as indicated or ordered   Monitor response to interventions for patient's volume status, including labs, urine output, blood pressure (other measures as available)   Encourage oral intake as appropriate   Instruct patient on fluid and nutrition restrictions as appropriate  Goal: Glucose maintained within prescribed range  Outcome: Completed  Flowsheets (Taken 9/7/2022 0756)  Glucose maintained within prescribed range:   Monitor blood glucose as ordered   Assess for signs and symptoms of hyperglycemia and hypoglycemia   Administer ordered medications to maintain glucose within target range   Assess barriers to adequate nutritional intake and initiate nutrition consult as needed   Instruct patient on self management of diabetes and initiate consult as needed     Problem: Hematologic - Adult  Goal: Maintains hematologic stability  Outcome: Completed  Flowsheets (Taken 9/7/2022 0756)  Maintains hematologic stability: Assess for signs and symptoms of bleeding or hemorrhage     Problem: Anxiety  Goal: Will report anxiety at manageable levels  Description: INTERVENTIONS:  1. Administer medication as ordered  2. Teach and rehearse alternative coping skills  3. Provide emotional support with 1:1 interaction with staff  Outcome: Completed  Flowsheets (Taken 9/7/2022 0756)  Will report anxiety at manageable levels:   Administer medication as ordered   Teach and rehearse alternative coping skills   Provide emotional support with 1:1 interaction with staff     Problem: Decision Making  Goal: Pt/Family able to effectively weigh alternatives and participate in decision making related to treatment and care  Description: INTERVENTIONS:  1.  Determine when there are differences between patient's view, family's view, and healthcare provider's view of condition  2. Facilitate patient and family articulation of goals for care  3. Help patient and family identify pros/cons of alternative solutions  4. Provide information as requested by patient/family  5. Respect patient/family right to receive or not to receive information  6. Serve as a liaison between patient and family and health care team  7. Initiate Consults from Ethics, Palliative Care or initiate [address] as is appropriate  Outcome: Completed  Flowsheets (Taken 9/7/2022 1076)  Patient/family able to effectively weigh alternatives and participate in decision making related to treatment and care:   Determine when there are differences between patient's view, family's view, and healthcare provider's view of condition   Facilitate patient and family articulation of goals for care   Help patient and family identify pros/cons of alternative solutions   Provide information as requested by patient/family   Respect patient/family right to receive or not to receive information     Problem: Confusion  Goal: Confusion, delirium, dementia, or psychosis is improved or at baseline  Description: INTERVENTIONS:  1. Assess for possible contributors to thought disturbance, including medications, impaired vision or hearing, underlying metabolic abnormalities, dehydration, psychiatric diagnoses, and notify attending LIP  2. [place] high risk fall precautions, as indicated  3. Provide frequent short contacts to provide reality reorientation, refocusing and direction  4. Decrease environmental stimuli, including noise as appropriate  5. Monitor and intervene to maintain adequate nutrition, hydration, elimination, sleep and activity  6. If unable to ensure safety without constant attention obtain sitter and review sitter guidelines with assigned personnel  7.  Initiate Psychosocial CNS and Spiritual Care consult, as indicated  Outcome: Completed  Flowsheets (Taken 9/7/2022 2596)  Effect of thought disturbance (confusion, delirium, dementia, or psychosis) are managed with adequate functional status:   Assess for contributors to thought disturbance, including medications, impaired vision or hearing, underlying metabolic abnormalities, dehydration, psychiatric diagnoses, notify [facility] high risk fall precautions, as indicated   Provide frequent short contacts to provide reality reorientation, refocusing and direction   Decrease environmental stimuli, including noise as appropriate   Monitor and intervene to maintain adequate nutrition, hydration, elimination, sleep and activity     Problem: Behavior  Goal: Pt/Family maintain appropriate behavior and adhere to behavioral management agreement, if implemented  Description: INTERVENTIONS:  1. Assess patient/family's coping skills and  non-compliant behavior (including use of illegal substances)  2. Notify security of behavior or suspected illegal substances which indicate the need for search of the family and/or belongings  3. Encourage verbalization of thoughts and concerns in a socially appropriate manner  4. Utilize positive, consistent limit setting strategies supporting safety of patient, staff and others  5. Encourage participation in the decision making process about the behavioral management agreement  6. If a visitor's behavior poses a threat to safety call refer to organization policy.   7. Initiate consult with , Psychosocial CNS, Spiritual Care as appropriate  Outcome: Completed  Flowsheets (Taken 9/7/2022 9561)  Patient/family maintains appropriate behavior and adheres to behavioral management agreement, if implemented:   Assess patient/familys coping skills and  non-compliant behavior (including use of illegal substances)   Notify security of behavior or suspected illegal substances which indicate the need for search of the patient and/or belongings   Encourage verbalization of thoughts and concerns in a socially appropriate manner   Utilize positive, consistent limit setting strategies supporting safety of patient, staff and others   Encourage participation in the decision making process about the behavioral management agreement   Implement a Health Care Agreement if patient meets criteria

## 2022-09-07 NOTE — PROGRESS NOTES
MD Frank Soares MD Renold Riling, MD                Office: [phone]                      Fax: [phone] INPATIENT PROGRESS NOTE:     PATIENT NAME: Saleem Odonnell  : 1975  MRN: [id number]         IMPRESSION / RECOMMENDATION:      Admitted for:  Volume overload [E87.70]  Hemodialysis catheter dysfunction, initial encounter ([address] Ryland Appl  Presence of arteriovenous fistula for hemodialysis ([address] [Z99.2]      1. ESRD on iHD: MWF ,   - outpt HD center: [facility], Dr Deejay Cooley   -   started HD in 2021, during admission w/ ATN w/ KINZA on CKD-4, was lost for f/u,), had acute hypoxic respiratory failure, pneumonia - needing intubation in past   - last HD 22 outpt     -consulted, and appreciated assistance by IR abd vascular sx   - s/p TDC exchange on   - f/u outpt for AVF intervention (no T/B, Rt arm swelling (+)     -HD today again - [facility] working ok     [facility] for [email or web address]       2. Hypertension/Volume Status:  - BP HTN - hx of resistant HTN -   uncontrolled - f/u after HD + resume home meds - as improving   - EDW reported 90 kg  -> 95 kg currently    - fluid overload - mild   - Na - chronic hypoNatremia - f/u w/ HD      3. Electrolytes/acid-base:  K: WNL  High AG metabolic acidosis: mild - f/u w/ HD - as better     4. Anemia of renal failure: below goal  - RIA: w/ HD  - check iron outpt     5. BMD:  - Phos, calcium: follow iPTH outpt      : Other supportive care :   - Check daily renal function panel with electrolytes-phosphorus  - Strict monitoring of I/Os, daily weight  - Renal feeds/diet  - Current medications reviewed. - Nephrotoxic medications have been discontinued. - Dose adjusted and appropriate. - Dose meds for eGFR <15 mL/min/1.73m2.    - Avoid heavy opioids due to renal failure - may use very low dose dilaudid / fentanyl with close monitoring of CNS and respiratory depression.            Other Problems:  Hospital Problems             Last Modified POA    * (Principal) Volume overload 9/5/2022 Yes    Hemodialysis catheter dysfunction ([address] 9/6/2022 Yes     Please refer to orders. Multiple complex problems. High risk  Discussed with patient, and treatment team  , HD nurse  , hospitalist   Thank you for allowing me to participate in this patient's care. Please do not hesitate to contact me with any questions/concerns. We will follow along with you. Keena Mina MD  [facility] [address] [place] [address] Road  Office: [phone] or Via [employer]  Fax: [phone]    Time spent  ~ 35 minutes that included face-to-face meeting/discussion with patient, and treatment team (including primary/referring team and other consultants; included coordination of care with the treatment team; and review of patient's electronic medical records and ordering appropriates tests.       ===========================================  ===========================================    Subjective  Resting in bed  Seen On HD   Tolerating it well          Past medical, surgical, social and family medial history reviewed by me:   PAST MEDICAL HISTORY:   Past Medical History:   Diagnosis Date    Blind in both eyes     Cerebral artery occlusion with cerebral infarction ([address]     CHF (congestive heart failure) ([address]     Chronic kidney disease     COPD (chronic obstructive pulmonary disease) ([address]     Depression     Diabetes mellitus out of control ([address]     Diabetes mellitus, type II ([address]     2005    Diabetic neuropathy associated with type 2 diabetes mellitus ([address]     Generalized headaches     Hypertension     Infertility     Insomnia     chronic vs lack of time spent to sleep    Migraine headache 11/09/2011    Mixed hyperlipidemia     Otitis media     h/o recurrent    Pelvic abscess in female 10/05/2013    Pneumonia     2004 approx.     Stroke ([address] 08/27/2020    Stroke ([address] 08/27/2021     PAST SURGICAL HISTORY:   Past Surgical History:   Procedure Laterality Date    CERVIX SURGERY      laser tx for dysplasia;1992    DIALYSIS FISTULA CREATION Right 2/10/2022    RIGHT BRACHIO CEPHALIC FISTULA CREATION performed by Jeri Eastman MD at [address] Right     FOOT SURGERY Bilateral     FOOT SURGERY Left     IR TUNNELED CATHETER PLACEMENT GREATER THAN 5 YEARS  9/7/2021    IR TUNNELED CATHETER PLACEMENT GREATER THAN 5 YEARS 9/7/2021 Mackenzie Acharya MD [facility] SPECIAL PROCEDURES     FAMILY HISTORY:   Family History   Problem Relation Age of Onset    Diabetes Mother     Other Mother 79        Covid    Diabetes Father     High Blood Pressure Father     Colon Cancer Father     Diabetes Sister     Alcohol Abuse Maternal Grandfather     Diabetes Paternal Grandmother     Alcohol Abuse Paternal Grandfather     Diabetes Paternal Aunt     Diabetes Paternal Uncle      SOCIAL HISTORY:   Social History     Socioeconomic History    Marital status:      Spouse name: [place] Monique    Number of children: 0    Years of education: None    Highest education level: None   Occupational History    Occupation: works as    Tobacco Use    Smoking status: Every Day     Packs/day: 1.00     Types: Cigarettes    Smokeless tobacco: Never    Tobacco comments:     Quit in August 2021- started back up   Vaping Use    Vaping Use: Never used   Substance and Sexual Activity    Alcohol use: No     Comment: Very Rare    Drug use: No    Sexual activity: Yes     Partners: Male          MEDICATIONS reviewed by me:  Prior to Admission Medications:  No current facility-administered medications on file prior to encounter.      Current Outpatient Medications on File Prior to Encounter   Medication Sig Dispense Refill    ALPRAZolam (ALPRAZOLAM XR) 2 MG extended release tablet TAKE ONE TABLET BY MOUTH EVERY MORNING 30 tablet 0    [employer] PEN NEEDLES 31G 31G X 8 MM MISC       midodrine (PROAMATINE) 10 MG tablet TAKE ONE TABLET BY MOUTH THREE TIMES A DAY 90 tablet 5    tiotropium-olodaterol (STIOLTO RESPIMAT) 2.5-2.5 MCG/ACT AERS Inhale 2 puffs into the lungs in the morning. 4 g 5    pregabalin (LYRICA) 75 MG capsule Take 1 capsule by mouth in the morning for 30 days. 30 capsule 2    insulin lispro, 1 Unit Dial, 100 UNIT/ML SOPN Inject 0-6 Units into the skin 3 times daily (with meals) Per sliding scale 3 pen 0    Dulaglutide (TRULICITY) 3 UT/3.2FN SOPN Inject 3 mg into the skin once a week 4 pen 2    insulin glargine (LANTUS;BASAGLAR) 100 UNIT/ML injection pen Inject 10 Units into the skin nightly 5 pen 3    albuterol sulfate HFA (PROVENTIL;VENTOLIN;PROAIR) 108 (90 Base) MCG/ACT inhaler Inhale 2 puffs into the lungs every 6 hours as needed for Wheezing or Shortness of Breath 18 g 1    Atogepant (QULIPTA) 10 MG TABS Take 10 mg by mouth daily 30 tablet 5    desvenlafaxine succinate (PRISTIQ) 25 MG TB24 extended release tablet Take 1 tablet by mouth in the morning. 30 tablet 1    furosemide (LASIX) 80 MG tablet Take 80 mg by mouth daily       Handicap Placard MISC by Does not apply route Expires 5/26/2027 1 each 0    atorvastatin (LIPITOR) 40 MG tablet Take 1 tablet by mouth nightly 30 tablet 11    aspirin 81 MG EC tablet Take 1 tablet by mouth daily 30 tablet 3       Medications Prior to Admission: ALPRAZolam (ALPRAZOLAM XR) 2 MG extended release tablet, TAKE ONE TABLET BY MOUTH EVERY MORNING  [employer] PEN NEEDLES 31G 31G X 8 MM MISC,   midodrine (PROAMATINE) 10 MG tablet, TAKE ONE TABLET BY MOUTH THREE TIMES A DAY  tiotropium-olodaterol (STIOLTO RESPIMAT) 2.5-2.5 MCG/ACT AERS, Inhale 2 puffs into the lungs in the morning. pregabalin (LYRICA) 75 MG capsule, Take 1 capsule by mouth in the morning for 30 days.   insulin lispro, 1 Unit Dial, 100 UNIT/ML SOPN, Inject 0-6 Units into the skin 3 times daily (with meals) Per sliding scale  Dulaglutide (TRULICITY) 3 HC/4.5XG SOPN, Inject 3 mg into the skin once a week  insulin glargine (LANTUS;BASAGLAR) 100 UNIT/ML injection pen, Inject 10 Units into the skin nightly  albuterol sulfate HFA (PROVENTIL;VENTOLIN;PROAIR) 108 (90 Base) MCG/ACT inhaler, Inhale 2 puffs into the lungs every 6 hours as needed for Wheezing or Shortness of Breath  Atogepant (QULIPTA) 10 MG TABS, Take 10 mg by mouth daily  desvenlafaxine succinate (PRISTIQ) 25 MG TB24 extended release tablet, Take 1 tablet by mouth in the morning. furosemide (LASIX) 80 MG tablet, Take 80 mg by mouth daily   Handicap Placard MISC, by Does not apply route Expires 5/26/2027  [DISCONTINUED] rizatriptan (MAXALT) 10 MG tablet, Take 1 tablet by mouth once as needed for Migraine May repeat in 2 hours if needed  atorvastatin (LIPITOR) 40 MG tablet, Take 1 tablet by mouth nightly  aspirin 81 MG EC tablet, Take 1 tablet by mouth daily     Inpatient Medications:  Scheduled Meds:   pregabalin  75 mg Oral Daily    aspirin  81 mg Oral Daily    atorvastatin  40 mg Oral Nightly    furosemide  80 mg Oral Daily    insulin glargine  10 Units SubCUTAneous Nightly    tiotropium-olodaterol  2 puff Inhalation Daily    sodium chloride flush  5-40 mL IntraVENous 2 times per day    insulin lispro  0-4 Units SubCUTAneous TID WC    insulin lispro  0-4 Units SubCUTAneous Nightly     Continuous Infusions:   sodium chloride      dextrose       PRN Meds:. ALPRAZolam, butalbital-acetaminophen-caffeine, albuterol sulfate HFA, midodrine, sodium chloride flush, sodium chloride, ondansetron **OR** ondansetron, polyethylene glycol, acetaminophen **OR** acetaminophen, dextrose bolus **OR** dextrose bolus, glucagon (rDNA), dextrose    Allergies: Amoxicillin, Levofloxacin, Vancomycin, and Tape [adhesive tape]    REVIEW OF SYSTEMS:  As mentioned in HPI and CC  All other 10-point review of systems: negative.             PHYSICAL EXAM:  Patient Vitals for the past 24 hrs:   BP Temp Temp src Pulse Resp SpO2 Weight   09/07/22 0837 (!) 177/81 96.8 °F (36 °C) Tympanic 90 16 -- 206 lb 2.1 oz (93.5 kg)   09/07/22 0756 (!) 164/87 98.1 °F (36.7 °C) Temporal 91 16 95 % --   09/07/22 0645 -- -- -- 93 -- -- --   09/07/22 0341 (!) 144/76 98.2 °F (36.8 °C) Oral 93 14 94 % 211 lb 6.4 oz (95.9 kg)   09/07/22 0219 -- -- -- 95 -- -- --   09/07/22 0015 (!) 153/80 98.5 °F (36.9 °C) Oral 95 14 94 % --   09/06/22 2357 -- -- -- 96 -- -- --   09/06/22 2215 -- -- -- 97 -- -- --   09/06/22 2016 (!) 160/71 100.3 °F (37.9 °C) Oral (!) 103 14 94 % --   09/06/22 1500 (!) 152/83 98 °F (36.7 °C) Oral (!) 104 16 97 % --   09/06/22 1233 (!) 146/69 97.8 °F (36.6 °C) Oral (!) 101 18 99 % --         Intake/Output Summary (Last 24 hours) at 9/7/2022 1038  Last data filed at 9/7/2022 0756  Gross per 24 hour   Intake 730 ml   Output --   Net 730 ml          Physical Exam  Vitals reviewed. Constitutional:       General: She is not in acute distress. Appearance: Normal appearance. She is ill-appearing. Comments: Obese body habitus   HENT:      Head: Normocephalic and atraumatic. Right Ear: External ear normal.      Left Ear: External ear normal.      Nose: Nose normal.      Mouth/Throat:      Mouth: Mucous membranes are moist. Mucous membranes are not dry. Eyes:      General: No scleral icterus. Conjunctiva/sclera: Conjunctivae normal.   Neck:      Thyroid: No thyroid mass. Vascular: No JVD. Trachea: Trachea normal.   Cardiovascular:      Rate and Rhythm: Normal rate and regular rhythm. Heart sounds: S1 normal and S2 normal.   Pulmonary:      Effort: Pulmonary effort is normal. No respiratory distress. Breath sounds: Normal breath sounds. Abdominal:      General: Bowel sounds are normal. There is no distension. Palpations: Abdomen is soft. Musculoskeletal:         General: Swelling (mild) present. No deformity. Cervical back: Normal range of motion and neck supple. Skin:     General: Skin is warm and dry. Coloration: Skin is not jaundiced.    Neurological:      Mental Status: She is alert and oriented to person, place, and time. Mental status is at baseline. Psychiatric:         Mood and Affect: Mood normal.         Behavior: Behavior normal.         DATA:  Diagnostic tests reviewed for today's visit:    Recent Labs     09/05/22  1513 09/06/22  0915 09/07/22  0601   WBC 12.0* 12.6* 11.1*   HCT 28.3* 24.5* 26.4*    209 198       Iron Saturation:  No components found for: PERCENTFE  FERRITIN:    Lab Results   Component Value Date/Time    FERRITIN 112.0 08/28/2021 04:20 AM     IRON:    Lab Results   Component Value Date/Time    IRON 36 01/04/2022 01:09 PM     TIBC:    Lab Results   Component Value Date/Time    TIBC 299 01/04/2022 01:09 PM       Recent Labs     09/05/22  1513 09/06/22  0915 09/07/22  0601   * 137 136   K 4.0 4.4 4.2   CL 96* 96* 97*   CO2 21 21 22   BUN 72* 80* 55*   CREATININE 10.3* 10.5* 8.1*       Recent Labs     09/05/22 1513 09/06/22  0915 09/07/22  0601   CALCIUM 8.5 8.4 8.7   PHOS  --  8.6* 6.0*       No results for input(s): PH, PCO2, PO2 in the last 72 hours. Invalid input(s): Vielka Steve    ABG:  No results found for: PH, PCO2, PO2, HCO3, BE, THGB, TCO2, O2SAT  VBG:    Lab Results   Component Value Date/Time    PHVEN 7.302 11/02/2021 11:37 PM    MLT5LQF 60.7 11/02/2021 11:37 PM    BEVEN 2.6 11/02/2021 11:37 PM    Y6TKDFJB 100 11/02/2021 11:37 PM           BELOW MENTIONED RADIOLOGY STUDY RESULTS REVIEWED BY ME:          XR CHEST PORTABLE     Narrative   EXAMINATION:   ONE XRAY VIEW OF THE CHEST       9/2/2022 3:40 pm       COMPARISON:   January 4, 2022       HISTORY:   ORDERING SYSTEM PROVIDED HISTORY: missed dialysis   TECHNOLOGIST PROVIDED HISTORY:   Reason for exam:->missed dialysis   Reason for Exam: missed dialysis       FINDINGS:   The cardiomediastinal silhouette is not enlarged. No pleural effusion or   pneumothorax. No focal consolidation. Right-sided hemodialysis catheter   over the right atrium.            Impression   No acute cardiopulmonary abnormality.

## 2022-09-07 NOTE — PLAN OF CARE
Problem: Discharge Planning  Goal: Discharge to home or other facility with appropriate resources  Outcome: Progressing     Problem: Pain  Goal: Verbalizes/displays adequate comfort level or baseline comfort level  Outcome: Progressing     Problem: Safety - Adult  Goal: Free from fall injury  Outcome: Progressing    Problem: Chronic Conditions and Co-morbidities  Goal: Patient's chronic conditions and co-morbidity symptoms are monitored and maintained or improved  Outcome: Progressing

## 2022-09-07 NOTE — PROGRESS NOTES
Chart reviewed post [facility] exchange yesterday. No problems noted at this time. Call IR with questions.

## 2022-09-07 NOTE — PROGRESS NOTES
Data- discharge order received, pt verbalized agreement to discharge, disposition to previous residence, no needs for HHC/DME. Action- discharge instructions prepared and given to Elba Cortes and spouse, pt verbalized understanding. Medication information packet given r/t NEW and/or CHANGED prescriptions emphasizing name/purpose/side effects, pt verbalized understanding. Discharge instruction summary: Diet- Renal with a 1000 fluid restriction, Activity- as tolerated, Primary Care Physician as follows: Guzman Stone -[phone] f/u appointment to be made at pt request, pt also has appointment with vascular on Sept 13, no changes in medications. CHF Education reviewed. Pt/ Family has had a total of 60 minutes CHF education this admission encounter. 1. WEIGHT: Admit Weight: 198 lb (89.8 kg) (09/05/22 1857)        Today  Weight: 199 lb 4.7 oz (90.4 kg) (09/07/22 1157)       2. O2 SAT.: SpO2: 95 % (09/07/22 1247)    Response- Pt belongings gathered, IV removed. Disposition is home (no HHC/DME needs), transported to [facility] via w/c w/ belongings, no complications.

## 2022-09-08 ENCOUNTER — CARE COORDINATION (OUTPATIENT)
Dept: CASE MANAGEMENT | Age: 47
End: 2022-09-08

## 2022-09-08 DIAGNOSIS — E87.70 HYPERVOLEMIA, UNSPECIFIED HYPERVOLEMIA TYPE: Primary | ICD-10-CM

## 2022-09-08 PROCEDURE — 1111F DSCHRG MED/CURRENT MED MERGE: CPT | Performed by: FAMILY MEDICINE

## 2022-09-08 NOTE — CARE COORDINATION
[facility] Transitions Initial Follow Up Call    Call within 2 business days of discharge: Yes    Patient: Olinda Rosas Patient : 1975   MRN: [id number]  Reason for Admission:   Discharge Date: 22 RARS: Readmission Risk Score: 34.5      Last Discharge [facility]       Date Complaint Diagnosis Description Type Department Provider    22 Other Hemodialysis catheter dysfunction, initial encounter ([facility] . .. ED to Hosp-Admission (Discharged) ([facility]) Alex Menard MD; Emily De Los Santos MD             Spoke with: Olinda Rosas  Non-face-to-face services provided:  Obtained and reviewed discharge summary and/or continuity of care documents  1111F completed  Transitions of Care Initial Call    Was this an external facility discharge? No Discharge Facility:     Challenges to be reviewed by the provider   Additional needs identified to be addressed with provider: No  none             Method of communication with provider : none    Advance Care Planning:   Does patient have an Advance Directive: not on file; education provided. Care Transition Nurse contacted the patient by telephone to perform post hospital discharge assessment. Verified name and  with patient as identifiers. Provided introduction to self, and explanation of the CTN role. CTN reviewed discharge instructions, medical action plan and red flags with patient who verbalized understanding. Patient given an opportunity to ask questions and does not have any further questions or concerns at this time. Were discharge instructions available to patient? Yes. Reviewed appropriate site of care based on symptoms and resources available to patient including: When to call 911. The patient agrees to contact the PCP office for questions related to their healthcare. Medication reconciliation was performed with patient, who verbalizes understanding of administration of home medications.  Advised obtaining a 90-day supply of all daily and as-needed medications. Was patient discharged with a pulse oximeter? no    Pt states doing well, no issues or concerns. Chest a little sore from getting the new catheter. Denies SOB, CP. Gets weighed at dialysis. F/U appts listed below. Agreed to more CTC f/u calls. CTN provided contact information. Plan for follow-up call in 5-7 days based on severity of symptoms and risk factors.   Plan for next call: self management-volume overload, HD, f/u appts     Care Transitions 24 Hour Call    Do you have a copy of your discharge instructions?: Yes  Do you have all of your prescriptions and are they filled?: Yes  Have you been contacted by a [address]?: No  Have you scheduled your follow up appointment?: Yes  How are you going to get to your appointment?: Car - family or friend to transport  Do you have support at home?: Partner/Spouse/SO  Do you feel like you have everything you need to keep you well at home?: Yes  Are you an active caregiver in your home?: No  Care Transitions Interventions  No Identified Needs         Follow Up  Future Appointments   Date Time Provider Ely Camacho   9/13/2022  2:00 PM Jose Gallagher MD FF VASC/ENDO MMA   9/15/2022  1:30 PM Baljit Jay MD [address] [place] [address]   10/20/2022  2:00 PM Ileana Rich MD PULM & CC MMA   11/6/2022  8:00 PM [address] SLEEP LAB ROOM [address] [place] [address],[address], RN

## 2022-09-09 NOTE — DISCHARGE SUMMARY
[facility] DISCHARGE SUMMARY    Patient Demographics    Patient. Jed Orellana  Date of Birth. 1975  MRN. [id number]     Primary care provider. Sabine Silver MD  (Tel: [phone])    Admit date: 9/5/2022    Discharge date (blank if same as Note Date): 9/7/2022  Note Date: 9/9/2022     Reason for Hospitalization. Chief Complaint   Patient presents with    Other     States was sent in to have dialysis today and dialysis could not access fistula. Pt told to come to ER. [facility] Course. Dialysis catheter malfunction  With missed dialysis. Underwent dialysis. Volume remained stable patient was discharged stable  -Further recommendation to follow  -Patient underwent tunnel cath exchange during admission     Anxiety  -Continue Ativan as needed    Consults. IP CONSULT TO NEPHROLOGY  IP CONSULT TO VASCULAR SURGERY    Physical examination on discharge day. /75   Pulse (!) 101   Temp 98.7 °F (37.1 °C)   Resp 16   Ht 5' 7\" (1.702 m)   Wt 199 lb 4.7 oz (90.4 kg)   LMP 09/01/2022   SpO2 95%   BMI 31.21 kg/m²   General appearance. Alert. Looks comfortable. HEENT. Sclera clear. Moist mucus membranes. Cardiovascular. Regular rate and rhythm, normal S1, S2. No murmur. Respiratory. Not using accessory muscles. Clear to auscultation bilaterally, no wheeze. Gastrointestinal. Abdomen soft, non-tender, not distended, normal bowel sounds  Neurology. Facial symmetry. No speech deficits. Moving all extremities equally. Extremities. No edema in lower extremities. Skin. Warm, dry, normal turgor    Condition at time of discharge stable     Medication instructions provided to patient at discharge.      Medication List        CONTINUE taking these medications      albuterol sulfate  (90 Base) MCG/ACT inhaler  Commonly known as: PROVENTIL;VENTOLIN;PROAIR  Inhale 2 puffs into the lungs every 6 hours as needed for Wheezing or Shortness of Breath  Notes to patient: Albuterol/pratropium (DuoNeb) or Albuterol (Proventil)  Use: to open airways, reduce secretions  Side effects: nervousness, jitteriness, shakiness, headache, fast heartbeat     ALPRAZolam 2 MG extended release tablet  Commonly known as: ALPRAZolam XR  TAKE ONE TABLET BY MOUTH EVERY MORNING  Notes to patient: Use: to treat anxiety or panic disorders  Side effects: drowsiness, light-headedness, irritability, decreased appetite  Notify physician for- forgetfulness, difficulty in speaking patterns, loss of coordination     aspirin 81 MG EC tablet  Take 1 tablet by mouth daily  Notes to patient: Aspirin  Use: Prevents heart attacks & strokes. Side effects: bleeding or bruising more easily, upset stomach. atorvastatin 40 MG tablet  Commonly known as: LIPITOR  Take 1 tablet by mouth nightly  Notes to patient:    Atorvastatin (Lipitor®)  Use: lower bad cholesterol   Side effects: headache, muscle pains, constipation, diarrhea. desvenlafaxine succinate 25 MG Tb24 extended release tablet  Commonly known as: Pristiq  Take 1 tablet by mouth in the morning. Notes to patient: Use: to treat depression  Side effects: chills, confusion, dizziness, light-headedness, fast and/or irregular heartbeat     furosemide 80 MG tablet  Commonly known as: LASIX  Notes to patient:  For fluid management    Side effects: low potassium, increased urination, increased dry mouth/thirst     Handicap Placard Misc  by Does not apply route Expires 5/26/2027     insulin glargine 100 UNIT/ML injection pen  Commonly known as: LANTUS;BASAGLAR  Inject 10 Units into the skin nightly  Notes to patient: Long acting insulin; keeps blood sugar from raising really high throughout the day  Side effects: low blood sugar     insulin lispro (1 Unit Dial) 100 UNIT/ML Sopn  Inject 0-6 Units into the skin 3 times daily (with meals) Per sliding scale  Notes to patient: Insulin Lispro (Humalog*)  Use: high blood sugar  Side effects: low blood sugar, irritation at injection site     [employer] Pen Needles 31G 31G X 8 MM Misc  Generic drug: Insulin Pen Needle     midodrine 10 MG tablet  Commonly known as: PROAMATINE  TAKE ONE TABLET BY MOUTH THREE TIMES A DAY  Notes to patient: Use: to treat orthostatic hypotension (blood pressure drops upon standing)  Side effects: blurred vision, headache, sensation of \"heart pounding\", dizziness     pregabalin 75 MG capsule  Commonly known as: LYRICA  Take 1 capsule by mouth in the morning for 30 days. Notes to patient: Use: treatment of seizures  Side effects: shortness of breath, chest tightness     Qulipta 10 MG Tabs  Generic drug: Atogepant  Take 10 mg by mouth daily     tiotropium-olodaterol 2.5-2.5 MCG/ACT Aers  Commonly known as: Stiolto Respimat  Inhale 2 puffs into the lungs in the morning. Trulicity 3 KN/0.7TK Sopn  Generic drug: Dulaglutide  Inject 3 mg into the skin once a week  Notes to patient: Injectable medication to help control type 2 diabetes  Side effects: nausea, diarrhea               Discharge recommendations given to patient. Follow Up. pcp in 1 week   Disposition. home  Activity. activity as tolerated  Diet: No diet orders on file      Spent 45  minutes in discharge process.     Signed:  Tati Merida MD     9/9/2022 5:26 PM

## 2022-09-10 NOTE — PROGRESS NOTES
Jed Orellana   52 y.o. female   1975    HPI:    Patient was last seen by me on 2022. During our last office visit:   -Seen for issues of anxiety/depression. I switched Venlafaxine to Desvenlafaxine.   -For issues of hypotension related to dialysis, her midodrine was increased from 5 mg TID PRN to 10 mg TID PRN. -For migraine prophylaxis, I prescribed her Mercedes Matt. There have been some issues getting his approved. Ivanna Milligan was approved until 2023. Reason(s) for visit:   Chief Complaint   Patient presents with    Diabetes    Follow-up    Medication Refill     Pt was unable to start Pristiq due to insurance. Insurance did change this month. ESRD:  -Managed by nephrology. Updates:  -BP has improved with taking Midodrine, but had to increase it from 5 mg TID to 10 mg TID. She stated that her nephrologist has changed the way to dialyze her, which has helped minimizing issues with hypotension afterwards. Anxiety/depression:  -Meds tried: Sertraline, Fluoxetine, Fluvoxamine, Xanax, Bupropion, Alprazolam, Viibryd. -GeneSight test done in Dec 2021.  -Happy with Alprazolam 2 mg ER. Updates:  -Patient stated that Desvenlafaxine was not covered and she never got it. She has been only managing her stress/anxiety with taking Alprazolam.     Chronic migraines:  -Meds tried: Ajovy, Tylenol. Can't used NSAID. Updates:  -She has been on Mercedes Matt for about 2 months - she stated use to have migraines 5 days a week and now 2 days a week and are less severe, but duration is the same (about 12-24 hrs).     Type II diabetes mellitus:  -Last A1c =    Lab Results   Component Value Date    LABA1C 7.5 2022    LABA1C 6.7 2022    LABA1C 6.6 2022     Lab Results   Component Value Date    LABMICR YES 2022    LDLCALC 92 2021    CREATININE 8.1 (HH) 2022     -Glucose readings (on average):   [x] Fastin  [] Lunchtime:   [] Dinnertime:   [] Hasn't checked glucose readings  -Glucose reading  [] Low:  [] Max:  -Neuropathy symptoms: [x] Yes - on Lyrica [] No  -Gastroparesis symptoms: [] Yes [x] No   -Visual disturbances: [] Yes [x] No  -Eye exam:   -Last one -  [] Wears glasses/contact lenses   -Medication adherence: [x] Yes [] No  -Other comments: has low appetite on Trulicity 3 mg. Has only used rapid acting insulin once over past 4 months. COPD:  -No flare-ups. Allergies   Allergen Reactions    Amoxicillin Hives, Itching and Other (See Comments)     Tolerates cephalosporins  Patient tolerating cefazolin (ANCEF) as of October 11, 2018      Levofloxacin Anaphylaxis    Vancomycin Anaphylaxis, Hives and Shortness Of Breath    Tape [Adhesive Tape] Other (See Comments)     Paper tape turns skin bright red. Plastic tape okay. Current Outpatient Medications on File Prior to Visit   Medication Sig Dispense Refill    Methoxy PEG-Epoetin Beta (MIRCERA IJ) 50 mcg      [employer] PEN NEEDLES 31G 31G X 8 MM MISC       midodrine (PROAMATINE) 10 MG tablet TAKE ONE TABLET BY MOUTH THREE TIMES A DAY 90 tablet 5    tiotropium-olodaterol (STIOLTO RESPIMAT) 2.5-2.5 MCG/ACT AERS Inhale 2 puffs into the lungs in the morning. 4 g 5    Atogepant (QULIPTA) 10 MG TABS Take 10 mg by mouth daily 30 tablet 5    furosemide (LASIX) 80 MG tablet Take 80 mg by mouth daily       Handicap Placard MISC by Does not apply route Expires 5/26/2027 1 each 0    atorvastatin (LIPITOR) 40 MG tablet Take 1 tablet by mouth nightly 30 tablet 11    aspirin 81 MG EC tablet Take 1 tablet by mouth daily 30 tablet 3     No current facility-administered medications on file prior to visit.         Family History   Problem Relation Age of Onset    Diabetes Mother     Other Mother 79        Covid    Diabetes Father     High Blood Pressure Father     Colon Cancer Father     Diabetes Sister     Alcohol Abuse Maternal Grandfather     Diabetes Paternal Grandmother     Alcohol Abuse Paternal Grandfather     Diabetes Paternal breath and wheezing. Cardiovascular:  Negative for chest pain, palpitations and leg swelling. Gastrointestinal:  Negative for abdominal distention, abdominal pain, blood in stool, constipation, diarrhea, nausea and vomiting. Genitourinary:  Negative for dysuria, frequency and hematuria. Musculoskeletal:  Negative for arthralgias and back pain. Skin:  Negative for rash. Neurological:  Negative for dizziness, weakness, light-headedness, numbness and headaches. Psychiatric/Behavioral:  Negative for sleep disturbance. The patient is not nervous/anxious. Wt Readings from Last 3 Encounters:   09/15/22 204 lb 6.4 oz (92.7 kg)   09/13/22 215 lb 6.2 oz (97.7 kg)   09/07/22 199 lb 4.7 oz (90.4 kg)       BP Readings from Last 3 Encounters:   09/15/22 118/62   09/13/22 126/84   09/07/22 111/75       Pulse Readings from Last 3 Encounters:   09/15/22 (!) 109   09/07/22 (!) 101   09/02/22 (!) 103       /62   Pulse (!) 109   Temp 97.3 °F (36.3 °C)   Wt 204 lb 6.4 oz (92.7 kg)   LMP 09/01/2022   SpO2 97%   BMI 32.01 kg/m²      Physical Exam  Vitals reviewed. Constitutional:       General: She is awake. She is not in acute distress. Appearance: She is obese. She is not ill-appearing or diaphoretic. HENT:      Head: Normocephalic and atraumatic. No abrasion or masses. Hair is normal.      Right Ear: External ear normal.      Left Ear: External ear normal.      Nose: Nose normal.   Eyes:      General: Lids are normal. Gaze aligned appropriately. No scleral icterus. Right eye: No discharge. Left eye: No discharge. Extraocular Movements: Extraocular movements intact. Conjunctiva/sclera: Conjunctivae normal.   Neck:      Trachea: Phonation normal.   Cardiovascular:      Rate and Rhythm: Normal rate and regular rhythm. Pulmonary:      Effort: Pulmonary effort is normal. No respiratory distress. Breath sounds: No wheezing, rhonchi or rales.    Abdominal:      General: Abdomen is flat. There is no distension. Palpations: Abdomen is soft. Musculoskeletal:         General: No deformity. Normal range of motion. Cervical back: Normal range of motion. No erythema. Right lower leg: No edema. Left lower leg: No edema. Skin:     Coloration: Skin is not cyanotic, jaundiced or pale. Findings: No abrasion, abscess, bruising, ecchymosis, erythema, signs of injury, laceration, lesion, petechiae, rash or wound. Neurological:      General: No focal deficit present. Mental Status: She is alert. Mental status is at baseline. GCS: GCS eye subscore is 4. GCS verbal subscore is 5. GCS motor subscore is 6. Cranial Nerves: No cranial nerve deficit, dysarthria or facial asymmetry. Motor: No weakness, tremor, atrophy or seizure activity. Coordination: Coordination normal.      Gait: Gait is intact. Psychiatric:         Attention and Perception: Attention and perception normal.         Mood and Affect: Mood and affect normal.         Speech: Speech normal.         Behavior: Behavior normal. Behavior is cooperative. Thought Content: Thought content normal.        Assessment/Plan:   Padmini García was seen today for diabetes, follow-up and medication refill. Diagnoses and all orders for this visit:    Chronic migraine with aura  Comments:  Overall improvement with Yanet Gian. Will continue current regiment. Will try to get Pristiq covered. Orders:  -     desvenlafaxine succinate (PRISTIQ) 25 MG TB24 extended release tablet; Take 1 tablet by mouth daily    Generalized anxiety disorder with panic attacks  Comments: Will start on Pristiq. Continue Alprazolam CR 2 mg dose daily. Orders:  -     ALPRAZolam (ALPRAZOLAM XR) 2 MG extended release tablet; TAKE ONE TABLET BY MOUTH EVERY MORNING  -     desvenlafaxine succinate (PRISTIQ) 25 MG TB24 extended release tablet;  Take 1 tablet by mouth daily    Type 2 diabetes mellitus with diabetic polyneuropathy, with long-term current use of insulin (HCC)  Comments:  DM controlled and stable. Continue current medical regiment. Orders:  -     pregabalin (LYRICA) 75 MG capsule; Take 1 capsule by mouth daily for 30 days. -     insulin lispro, 1 Unit Dial, 100 UNIT/ML SOPN; Inject 0-6 Units into the skin 3 times daily (with meals) Per sliding scale  -     Dulaglutide (TRULICITY) 3 HZ/3.0DC SOPN; Inject 3 mg into the skin once a week  -     insulin glargine (LANTUS;BASAGLAR) 100 UNIT/ML injection pen; Inject 10 Units into the skin nightly    Type 2 diabetes mellitus with obesity (HCC)  -     Dulaglutide (TRULICITY) 3 EU/7.8IC SOPN; Inject 3 mg into the skin once a week    Chronic obstructive pulmonary disease, unspecified COPD type ([facility]  Comments:  Stable on current medical regiment. Orders:  -     albuterol sulfate HFA (PROVENTIL;VENTOLIN;PROAIR) 108 (90 Base) MCG/ACT inhaler; Inhale 2 puffs into the lungs every 6 hours as needed for Wheezing or Shortness of Breath    Hypoproteinemia ([facility]      I reviewed the plan of care with the patient. Patient acknowledged understanding and agreed with plan of care overall.     Medications Discontinued During This Encounter   Medication Reason    pregabalin (LYRICA) 75 MG capsule REORDER    insulin lispro, 1 Unit Dial, 100 UNIT/ML SOPN REORDER    Dulaglutide (TRULICITY) 3 TQ/2.9WH SOPN REORDER    insulin glargine (LANTUS;BASAGLAR) 100 UNIT/ML injection pen REORDER    albuterol sulfate HFA (PROVENTIL;VENTOLIN;PROAIR) 108 (90 Base) MCG/ACT inhaler REORDER    desvenlafaxine succinate (PRISTIQ) 25 MG TB24 extended release tablet REORDER    ALPRAZolam (ALPRAZOLAM XR) 2 MG extended release tablet REORDER        General information on medications:  -When it comes to medications, whether with starting or adding a new medication or increasing the dose of a current medication, the benefits and risks have to always be considered and weighed over, especially if one is taking other medications as well.   -There are no medications that have no side effects and that there is always a risk involved with taking a medication.    -If a side effect were to occur with starting a new medication or with increasing the dose of a current medication that either the medication can be totally discontinued altogether or simply decrease the dose of it and if this would be the case a follow-up appointment would be deemed necessary.    -The drug allergy list will then be updated with the corresponding side effect(s) if it's deemed to be a true 'drug allergy'. -The most common adverse effects of medication(s) were addressed at today's visit.    -Lastly, the coverage status of a medication may vary from insurance to insurance and the only way to verify if the medication is covered is to send an actual prescription in.    -The drug formulary of each insurance changes without any warning or notification to the healthcare provider let alone the pharmacy.  -The cost of medications vary from insurance to insurance and the cost is always subject to change just like the drug formulary. Follow-up: Return in about 3 months (around 12/15/2022) for A1c check - diabetes. .     Patient was informed that if his or her symptoms worsen to follow up with me sooner or go to the nearest ER if the symptoms are very significant and warrant higher level of care. Regarding my note:  -This note was composed (by me only and not with assistance via a scribe) to the best of my knowledge and recollection of the encounter with the patient using one of my own customized note templates utilizing a combination of typing and dictating with the [address] [id number] speech recognition software. As a result, the note may possibly contain various errors (e.g. spelling, grammar, and non-sensible words/phrases/statements) despite reviewing the note prior to signing it for completion.       Time spent includes some or all of the following, both face-to-face time and non face-to-face time, but is not limited to:  [x] Preparing to see the patient by reviewing medical records available (notes, labs, imaging, etc.) prior to seeing the patient. [x] Obtaining and/or reviewing the history from the patient. [x] Performing a medically appropriate examination. [x] Ordering of relevant lab work, medications, referrals, or procedures. [x] Discussing patient's medical issues and formulating an assessment and plan. [x] Reviewing plan of care with patient. Answering any questions or concerns. [x] Documentation within the electronic health record (EHR)  [] Reviewing records of history relevant to patient's issues after seeing the patient. [] Discussion or coordination of care with other health care professionals  [x] Other: length office visit - 30 minutes.  -I spent a significant amount of time discussing various issues as noted above and also with formulating a treatment plan for each specific issue. Patient was given the opportunity to ask me any questions and address any concerns/issues. I also reviewed lab work (if available) as well as prior notes from PCP and/or other specialists if available. Damon Thacker M.D.   [address] [place] [address]    Electronically signed by Baljit Jay MD M.D. on 9/15/2022 at 2:12 PM.

## 2022-09-13 ENCOUNTER — OFFICE VISIT (OUTPATIENT)
Dept: VASCULAR SURGERY | Age: 47
End: 2022-09-13
Payer: MEDICARE

## 2022-09-13 VITALS
WEIGHT: 215.39 LBS | BODY MASS INDEX: 33.81 KG/M2 | SYSTOLIC BLOOD PRESSURE: 126 MMHG | HEIGHT: 67 IN | DIASTOLIC BLOOD PRESSURE: 84 MMHG

## 2022-09-13 DIAGNOSIS — Z99.2 ESRD (END STAGE RENAL DISEASE) ON DIALYSIS (HCC): Primary | ICD-10-CM

## 2022-09-13 DIAGNOSIS — N18.6 ESRD (END STAGE RENAL DISEASE) ON DIALYSIS (HCC): Primary | ICD-10-CM

## 2022-09-13 PROCEDURE — G8417 CALC BMI ABV UP PARAM F/U: HCPCS | Performed by: SURGERY

## 2022-09-13 PROCEDURE — 4004F PT TOBACCO SCREEN RCVD TLK: CPT | Performed by: SURGERY

## 2022-09-13 PROCEDURE — 99212 OFFICE O/P EST SF 10 MIN: CPT | Performed by: SURGERY

## 2022-09-13 PROCEDURE — G8427 DOCREV CUR MEDS BY ELIG CLIN: HCPCS | Performed by: SURGERY

## 2022-09-13 PROCEDURE — 1111F DSCHRG MED/CURRENT MED MERGE: CPT | Performed by: SURGERY

## 2022-09-13 NOTE — PROGRESS NOTES
[facility])   Vascular Surgery Followup    Referring Provider:  Susan Jackson MD     Chief Complaint   Patient presents with    Follow-up        History of Present Illness:  Patient here today for follow-up with history of right brachiocephalic fistula creation in February 2022. She states that the proximal needle runs well however she is had multiple episodes of infiltration proximally. Her arm is swollen and bruised today. She has a tunneled dialysis catheter in place. Past Medical History:   has a past medical history of Blind in both eyes, Cerebral artery occlusion with cerebral infarction ([address] CHF (congestive heart failure) ([address] Chronic kidney disease, COPD (chronic obstructive pulmonary disease) ([address] Depression, Diabetes mellitus out of control ([address] Diabetes mellitus, type II ([address] Diabetic neuropathy associated with type 2 diabetes mellitus ([address] Generalized headaches, Hypertension, Infertility, Insomnia, Migraine headache, Mixed hyperlipidemia, Otitis media, Pelvic abscess in female, Pneumonia, Stroke ([address] and Stroke ([address] Surgical History:   has a past surgical history that includes Cervix surgery; eye surgery; Foot surgery (Right); Foot surgery (Bilateral); Foot surgery (Left); IR TUNNELED CVC PLACE WO SQ PORT/PUMP > 5 YEARS (9/7/2021); and Dialysis fistula creation (Right, 2/10/2022). Social History:   reports that she has been smoking cigarettes. She has been smoking an average of 1 pack per day. She has never used smokeless tobacco. She reports that she does not drink alcohol and does not use drugs. Family History:  family history includes Alcohol Abuse in her maternal grandfather and paternal grandfather; Durwin Grooms in her father; Diabetes in her father, mother, paternal aunt, paternal grandmother, paternal uncle, and sister; High Blood Pressure in her father; Other (age of onset: 79) in her mother.      Home Medications:  Current Outpatient Medications Medication Sig Dispense Refill    ALPRAZolam (ALPRAZOLAM XR) 2 MG extended release tablet TAKE ONE TABLET BY MOUTH EVERY MORNING 30 tablet 0    [employer] PEN NEEDLES 31G 31G X 8 MM MISC       midodrine (PROAMATINE) 10 MG tablet TAKE ONE TABLET BY MOUTH THREE TIMES A DAY 90 tablet 5    tiotropium-olodaterol (STIOLTO RESPIMAT) 2.5-2.5 MCG/ACT AERS Inhale 2 puffs into the lungs in the morning. 4 g 5    pregabalin (LYRICA) 75 MG capsule Take 1 capsule by mouth in the morning for 30 days. 30 capsule 2    insulin lispro, 1 Unit Dial, 100 UNIT/ML SOPN Inject 0-6 Units into the skin 3 times daily (with meals) Per sliding scale 3 pen 0    Dulaglutide (TRULICITY) 3 SJ/6.5FC SOPN Inject 3 mg into the skin once a week 4 pen 2    insulin glargine (LANTUS;BASAGLAR) 100 UNIT/ML injection pen Inject 10 Units into the skin nightly 5 pen 3    albuterol sulfate HFA (PROVENTIL;VENTOLIN;PROAIR) 108 (90 Base) MCG/ACT inhaler Inhale 2 puffs into the lungs every 6 hours as needed for Wheezing or Shortness of Breath 18 g 1    Atogepant (QULIPTA) 10 MG TABS Take 10 mg by mouth daily 30 tablet 5    desvenlafaxine succinate (PRISTIQ) 25 MG TB24 extended release tablet Take 1 tablet by mouth in the morning. 30 tablet 1    furosemide (LASIX) 80 MG tablet Take 80 mg by mouth daily       Handicap Placard MISC by Does not apply route Expires 5/26/2027 1 each 0    atorvastatin (LIPITOR) 40 MG tablet Take 1 tablet by mouth nightly 30 tablet 11    aspirin 81 MG EC tablet Take 1 tablet by mouth daily 30 tablet 3     No current facility-administered medications for this visit. Allergies:  Amoxicillin, Levofloxacin, Vancomycin, and Tape [adhesive tape]     Review of Systems:   Constitutional: there has been no unanticipated weight loss. There's been no change in energy level, sleep pattern, or activity level. Eyes: No visual changes or diplopia. No scleral icterus. ENT: No Headaches, hearing loss or vertigo.  No mouth sores or sore throat. Cardiovascular: Reviewed in HPI  Respiratory: No cough or wheezing, no sputum production. No hematemesis. Gastrointestinal: No abdominal pain, appetite loss, blood in stools. No change in bowel or bladder habits. Genitourinary: No dysuria, trouble voiding, or hematuria. Musculoskeletal:  No gait disturbance, weakness or joint complaints. Integumentary: No rash or pruritis. Neurological: No headache, diplopia, change in muscle strength, numbness or tingling. No change in gait, balance, coordination, mood, affect, memory, mentation, behavior. Psychiatric: No anxiety, no depression. Endocrine: No malaise, fatigue or temperature intolerance. No excessive thirst, fluid intake, or urination. No tremor. Hematologic/Lymphatic: No abnormal bruising or bleeding, blood clots or swollen lymph nodes. Allergic/Immunologic: No nasal congestion or hives. Physical Examination:    Vitals:    09/13/22 1337   BP: 126/84          General appearance: alert, appears stated age, cooperative, and no distress  Good thrill and bruit in the right brachiocephalic fistula. There is evidence of ecchymosis and hematoma consistent with previous access.     Assessment:     Patient Active Problem List   Diagnosis    Type 2 diabetes mellitus, with long-term current use of insulin ([facility])    Mixed hyperlipidemia    Migraine headache    Anemia    Diabetic foot infection ([facility]    Pyogenic inflammation of bone ([facility])    History of medication noncompliance    Osteomyelitis of left foot ([facility])    Nephrotic syndrome    Peripheral edema    Pulmonary edema    Right sided numbness    Tobacco dependence    Chronic kidney disease (CKD), stage III (moderate) ([facility])    Chronic diastolic (congestive) heart failure ([facility])    H/O: CVA (cerebrovascular accident)    Arterial ischemic stroke, ICA, left, acute (HCC)    HTN (hypertension), benign    DM (diabetes mellitus), secondary, uncontrolled, w/neurologic complic ([address]    Dyslipidemia    Smoker

## 2022-09-15 ENCOUNTER — CARE COORDINATION (OUTPATIENT)
Dept: CASE MANAGEMENT | Age: 47
End: 2022-09-15

## 2022-09-15 ENCOUNTER — OFFICE VISIT (OUTPATIENT)
Dept: FAMILY MEDICINE CLINIC | Age: 47
End: 2022-09-15
Payer: MEDICARE

## 2022-09-15 VITALS
DIASTOLIC BLOOD PRESSURE: 62 MMHG | SYSTOLIC BLOOD PRESSURE: 118 MMHG | BODY MASS INDEX: 32.01 KG/M2 | WEIGHT: 204.4 LBS | OXYGEN SATURATION: 97 % | TEMPERATURE: 97.3 F | HEART RATE: 109 BPM

## 2022-09-15 DIAGNOSIS — Z79.4 TYPE 2 DIABETES MELLITUS WITH DIABETIC POLYNEUROPATHY, WITH LONG-TERM CURRENT USE OF INSULIN (HCC): ICD-10-CM

## 2022-09-15 DIAGNOSIS — E11.42 TYPE 2 DIABETES MELLITUS WITH DIABETIC POLYNEUROPATHY, WITH LONG-TERM CURRENT USE OF INSULIN (HCC): ICD-10-CM

## 2022-09-15 DIAGNOSIS — E11.69 TYPE 2 DIABETES MELLITUS WITH OBESITY (HCC): ICD-10-CM

## 2022-09-15 DIAGNOSIS — J44.9 CHRONIC OBSTRUCTIVE PULMONARY DISEASE, UNSPECIFIED COPD TYPE (HCC): ICD-10-CM

## 2022-09-15 DIAGNOSIS — F41.1 GENERALIZED ANXIETY DISORDER WITH PANIC ATTACKS: ICD-10-CM

## 2022-09-15 DIAGNOSIS — G43.109 CHRONIC MIGRAINE WITH AURA: Primary | ICD-10-CM

## 2022-09-15 DIAGNOSIS — E77.8 HYPOPROTEINEMIA (HCC): ICD-10-CM

## 2022-09-15 DIAGNOSIS — E66.9 TYPE 2 DIABETES MELLITUS WITH OBESITY (HCC): ICD-10-CM

## 2022-09-15 DIAGNOSIS — F41.0 GENERALIZED ANXIETY DISORDER WITH PANIC ATTACKS: ICD-10-CM

## 2022-09-15 PROBLEM — I50.32 CHRONIC DIASTOLIC (CONGESTIVE) HEART FAILURE (HCC): Status: RESOLVED | Noted: 2020-08-27 | Resolved: 2022-09-15

## 2022-09-15 PROBLEM — F13.931 BENZODIAZEPINE WITHDRAWAL WITH DELIRIUM (HCC): Status: RESOLVED | Noted: 2022-03-07 | Resolved: 2022-09-15

## 2022-09-15 PROBLEM — I50.33 ACUTE ON CHRONIC DIASTOLIC HEART FAILURE (HCC): Status: RESOLVED | Noted: 2021-02-23 | Resolved: 2022-09-15

## 2022-09-15 PROCEDURE — 99214 OFFICE O/P EST MOD 30 MIN: CPT | Performed by: FAMILY MEDICINE

## 2022-09-15 PROCEDURE — 1111F DSCHRG MED/CURRENT MED MERGE: CPT | Performed by: FAMILY MEDICINE

## 2022-09-15 PROCEDURE — G8427 DOCREV CUR MEDS BY ELIG CLIN: HCPCS | Performed by: FAMILY MEDICINE

## 2022-09-15 PROCEDURE — 4004F PT TOBACCO SCREEN RCVD TLK: CPT | Performed by: FAMILY MEDICINE

## 2022-09-15 PROCEDURE — 3051F HG A1C>EQUAL 7.0%<8.0%: CPT | Performed by: FAMILY MEDICINE

## 2022-09-15 PROCEDURE — 3023F SPIROM DOC REV: CPT | Performed by: FAMILY MEDICINE

## 2022-09-15 PROCEDURE — G8417 CALC BMI ABV UP PARAM F/U: HCPCS | Performed by: FAMILY MEDICINE

## 2022-09-15 PROCEDURE — 2022F DILAT RTA XM EVC RTNOPTHY: CPT | Performed by: FAMILY MEDICINE

## 2022-09-15 RX ORDER — INSULIN LISPRO 100 [IU]/ML
0-6 INJECTION, SOLUTION INTRAVENOUS; SUBCUTANEOUS
Qty: 3 ADJUSTABLE DOSE PRE-FILLED PEN SYRINGE | Refills: 3 | Status: SHIPPED | OUTPATIENT
Start: 2022-09-15 | End: 2022-09-18 | Stop reason: SDUPTHER

## 2022-09-15 RX ORDER — DULAGLUTIDE 3 MG/.5ML
3 INJECTION, SOLUTION SUBCUTANEOUS WEEKLY
Qty: 4 ADJUSTABLE DOSE PRE-FILLED PEN SYRINGE | Refills: 3 | Status: SHIPPED | OUTPATIENT
Start: 2022-09-15

## 2022-09-15 RX ORDER — PREGABALIN 75 MG/1
75 CAPSULE ORAL DAILY
Qty: 30 CAPSULE | Refills: 2 | Status: SHIPPED | OUTPATIENT
Start: 2022-09-15 | End: 2022-10-15

## 2022-09-15 RX ORDER — ALPRAZOLAM 2 MG/1
TABLET, EXTENDED RELEASE ORAL
Qty: 30 TABLET | Refills: 0 | Status: SHIPPED
Start: 2022-09-15 | End: 2022-10-14 | Stop reason: CLARIF

## 2022-09-15 RX ORDER — ALBUTEROL SULFATE 90 UG/1
2 AEROSOL, METERED RESPIRATORY (INHALATION) EVERY 6 HOURS PRN
Qty: 18 G | Refills: 1 | Status: SHIPPED | OUTPATIENT
Start: 2022-09-15

## 2022-09-15 RX ORDER — DESVENLAFAXINE 25 MG/1
25 TABLET, EXTENDED RELEASE ORAL DAILY
Qty: 30 TABLET | Refills: 2 | Status: SHIPPED | OUTPATIENT
Start: 2022-09-15

## 2022-09-15 ASSESSMENT — ENCOUNTER SYMPTOMS
BLOOD IN STOOL: 0
VOMITING: 0
TROUBLE SWALLOWING: 0
DIARRHEA: 0
ABDOMINAL DISTENTION: 0
NAUSEA: 0
COUGH: 0
ABDOMINAL PAIN: 0
SHORTNESS OF BREATH: 0
SINUS PRESSURE: 0
BACK PAIN: 0
RHINORRHEA: 0
WHEEZING: 0
CHEST TIGHTNESS: 0
CONSTIPATION: 0

## 2022-09-16 ENCOUNTER — TELEPHONE (OUTPATIENT)
Dept: FAMILY MEDICINE CLINIC | Age: 47
End: 2022-09-16

## 2022-09-16 DIAGNOSIS — F41.1 GENERALIZED ANXIETY DISORDER WITH PANIC ATTACKS: ICD-10-CM

## 2022-09-16 DIAGNOSIS — F41.0 GENERALIZED ANXIETY DISORDER WITH PANIC ATTACKS: ICD-10-CM

## 2022-09-16 DIAGNOSIS — Z79.4 TYPE 2 DIABETES MELLITUS WITH DIABETIC POLYNEUROPATHY, WITH LONG-TERM CURRENT USE OF INSULIN (HCC): ICD-10-CM

## 2022-09-16 DIAGNOSIS — E11.42 TYPE 2 DIABETES MELLITUS WITH DIABETIC POLYNEUROPATHY, WITH LONG-TERM CURRENT USE OF INSULIN (HCC): ICD-10-CM

## 2022-09-16 PROBLEM — U07.1 ACUTE RESPIRATORY FAILURE DUE TO COVID-19 (HCC): Status: RESOLVED | Noted: 2021-10-16 | Resolved: 2022-09-16

## 2022-09-16 PROBLEM — J96.00 ACUTE RESPIRATORY FAILURE DUE TO COVID-19 (HCC): Status: RESOLVED | Noted: 2021-10-16 | Resolved: 2022-09-16

## 2022-09-16 RX ORDER — ALPRAZOLAM 2 MG/1
TABLET, EXTENDED RELEASE ORAL
Qty: 30 TABLET | OUTPATIENT
Start: 2022-09-16

## 2022-09-16 NOTE — TELEPHONE ENCOUNTER
Pt's pharmacy is calling to get clarification on the following medication, says it did not come with a sliding scale and they need to know how many units per day. Please correct and advise.      LAST OV: 09/15/22  NEXT OV: 01/05/23

## 2022-09-18 RX ORDER — INSULIN LISPRO 100 [IU]/ML
INJECTION, SOLUTION INTRAVENOUS; SUBCUTANEOUS
Qty: 3 ADJUSTABLE DOSE PRE-FILLED PEN SYRINGE | Refills: 5 | Status: SHIPPED | OUTPATIENT
Start: 2022-09-18 | End: 2022-09-21 | Stop reason: SDUPTHER

## 2022-09-20 ENCOUNTER — PROCEDURE VISIT (OUTPATIENT)
Dept: VASCULAR SURGERY | Age: 47
End: 2022-09-20
Payer: MEDICARE

## 2022-09-20 ENCOUNTER — CARE COORDINATION (OUTPATIENT)
Dept: CASE MANAGEMENT | Age: 47
End: 2022-09-20

## 2022-09-20 DIAGNOSIS — N18.6 ESRD (END STAGE RENAL DISEASE) ON DIALYSIS (HCC): ICD-10-CM

## 2022-09-20 DIAGNOSIS — Z99.2 ESRD (END STAGE RENAL DISEASE) ON DIALYSIS (HCC): ICD-10-CM

## 2022-09-20 PROCEDURE — 93990 DOPPLER FLOW TESTING: CPT | Performed by: SURGERY

## 2022-09-20 NOTE — CARE COORDINATION
[facility] Transitions Follow Up Call    2022    Patient: Elizabet Paz  Patient : 1975   MRN: [id number]  Reason for Admission:   Discharge Date: 22 RARS: Readmission Risk Score: 34.5         Per Epic, pt is currently at a f/u appt today, this nurse will follow up at a later date.         Follow Up  Future Appointments   Date Time Provider Ely Camacho   10/20/2022  2:00 PM Diya Biggs MD [facility]   2022  8:00 PM [facility] SLEEP LAB ROOM 2 [facility] SLEEP [facility]   2023  2:30 PM Katerina Hankins MD [facility] Nancy Kumar RN

## 2022-09-21 ENCOUNTER — CARE COORDINATION (OUTPATIENT)
Dept: CASE MANAGEMENT | Age: 47
End: 2022-09-21

## 2022-09-21 DIAGNOSIS — E11.42 TYPE 2 DIABETES MELLITUS WITH DIABETIC POLYNEUROPATHY, WITH LONG-TERM CURRENT USE OF INSULIN (HCC): ICD-10-CM

## 2022-09-21 DIAGNOSIS — Z79.4 TYPE 2 DIABETES MELLITUS WITH DIABETIC POLYNEUROPATHY, WITH LONG-TERM CURRENT USE OF INSULIN (HCC): ICD-10-CM

## 2022-09-21 RX ORDER — INSULIN LISPRO 100 [IU]/ML
INJECTION, SOLUTION INTRAVENOUS; SUBCUTANEOUS
Qty: 3 ADJUSTABLE DOSE PRE-FILLED PEN SYRINGE | Refills: 5 | Status: SHIPPED | OUTPATIENT
Start: 2022-09-21

## 2022-09-23 ENCOUNTER — CARE COORDINATION (OUTPATIENT)
Dept: CASE MANAGEMENT | Age: 47
End: 2022-09-23

## 2022-09-23 NOTE — CARE COORDINATION
[facility] Transitions Follow Up Call    2022    Patient: Radha Thomson  Patient : 1975   MRN: [id number]  Reason for Admission:   Discharge Date: 22 RARS: Readmission Risk Score: 34.5         Spoke with: Marva Mann 182 Transitions Subsequent and Final Call    Subsequent and Final Calls  Do you have any ongoing symptoms?: No  Have your medications changed?: No  Do you have any questions related to your medications?: No  Do you currently have any active services?: No  Do you have any needs or concerns that I can assist you with?: No  Care Transitions Interventions  No Identified Needs  Other Interventions:           Pt states doing well, no issues or concerns. No need for further f/u CTC calls per pt, doing fine.     Follow Up  Future Appointments   Date Time Provider Ely Camacho   10/20/2022  2:00 PM Hazel Elise MD [facility]   2022  8:00 PM [facility] SLEEP LAB ROOM 2 [facility] SLEEP [facility]   2023  2:30 PM Nasir Canseco MD [facility] Nancy Vizcarra RN

## 2022-09-28 ENCOUNTER — TELEPHONE (OUTPATIENT)
Dept: VASCULAR SURGERY | Age: 47
End: 2022-09-28

## 2022-09-28 NOTE — TELEPHONE ENCOUNTER
Discussed results of right arm HD access scan. Will have our office get her set up for fistulogram at [facility] for further evaluation.      Electronically signed by RIVERA Talbto CNP on 9/28/2022 at 8:59 AM

## 2022-09-28 NOTE — TELEPHONE ENCOUNTER
----- Message from [address] [place] [address] Gayle Carr MD sent at 9/28/2022  8:31 AM EDT -----  I think based on that concern for cephalic stenosis in the upper arm she should undergo a fistulogram.  We can also ensure there is no side branches preventing full maturation. Additionally they can do a central venogram to ensure no central vein stenosis. thanks  ----- Message -----  From: RIVERA Day - CNP  Sent: 9/27/2022   8:37 AM EDT  To: Sonam Diaz MD    Hey look at her AV scan. Does she need to go to [facility] for fistulogram for the upper arm cephalic vein stenosis seen on scan? Let me know. Thanks.

## 2022-09-28 NOTE — TELEPHONE ENCOUNTER
----- Message from RIVERA Barton CNP sent at 9/28/2022  8:59 AM EDT -----  Can we get her set up for fistulogram at [facility] as well as central venogram there. You can see Dr. Denisse Peres comments below as what to look for/do. Thanks.     ----- Message -----  From: Octavio Mcdonald MD  Sent: 9/28/2022   8:31 AM EDT  To: RIVERA Barton CNP    I think based on that concern for cephalic stenosis in the upper arm she should undergo a fistulogram.  We can also ensure there is no side branches preventing full maturation. Additionally they can do a central venogram to ensure no central vein stenosis. thanks  ----- Message -----  From: RIVERA Barton CNP  Sent: 9/27/2022   8:37 AM EDT  To: MD Micah Farrelly look at her AV scan. Does she need to go to [facility] for fistulogram for the upper arm cephalic vein stenosis seen on scan? Let me know. Thanks.

## 2022-10-13 ENCOUNTER — TELEPHONE (OUTPATIENT)
Dept: FAMILY MEDICINE CLINIC | Age: 47
End: 2022-10-13

## 2022-10-13 DIAGNOSIS — F41.0 GENERALIZED ANXIETY DISORDER WITH PANIC ATTACKS: ICD-10-CM

## 2022-10-13 DIAGNOSIS — F41.1 GENERALIZED ANXIETY DISORDER WITH PANIC ATTACKS: ICD-10-CM

## 2022-10-13 NOTE — TELEPHONE ENCOUNTER
Alprazolam has been generic both the immediate and extended release. I would recommend her pharmacy try to run the medication through her secondary insurance. Damon Toth

## 2022-10-13 NOTE — TELEPHONE ENCOUNTER
Pt is wondering if she could get a generic form of xanax. She stated she just switch to medicare and they are not covering it.

## 2022-10-14 RX ORDER — ALPRAZOLAM 1 MG/1
1 TABLET ORAL 2 TIMES DAILY PRN
Qty: 60 TABLET | Refills: 0 | Status: SHIPPED | OUTPATIENT
Start: 2022-10-14 | End: 2022-11-13

## 2022-10-14 NOTE — TELEPHONE ENCOUNTER
Pt reported that she currently only has Medicare but is working on getting a secondary insurance. Pt stated that Medicare will not cover Alprazolam XR. Pt needs the regular form sent in or a medication to replace this. Please advise. Pt is due for a refill. Please advise pt. [facility] in [place] on [address].

## 2022-10-14 NOTE — TELEPHONE ENCOUNTER
PDMP monitoring:  -No significant or noteworthy irregularities noted.   -Last report:   Last PDMP Bassem Braun as Reviewed [facility]):  Review User Review Instant Review Result   DAMON JAIMES 10/14/2022  7:07 PM Reviewed PDMP [1]     Will switch from Alprazolam XR to IR. [address]on [address]

## 2022-11-06 ENCOUNTER — HOSPITAL ENCOUNTER (OUTPATIENT)
Dept: SLEEP CENTER | Age: 47
Discharge: HOME | End: 2022-11-06
Payer: MEDICARE

## 2022-11-06 DIAGNOSIS — G47.33 OSA (OBSTRUCTIVE SLEEP APNEA): ICD-10-CM

## 2022-11-06 PROCEDURE — 95810 POLYSOM 6/> YRS 4/> PARAM: CPT

## 2022-11-07 ENCOUNTER — TELEPHONE (OUTPATIENT)
Dept: PULMONOLOGY | Age: 47
End: 2022-11-07

## 2022-11-07 PROBLEM — G47.33 OSA (OBSTRUCTIVE SLEEP APNEA): Status: ACTIVE | Noted: 2022-11-07

## 2022-11-07 PROCEDURE — 95810 POLYSOM 6/> YRS 4/> PARAM: CPT | Performed by: INTERNAL MEDICINE

## 2022-11-14 DIAGNOSIS — F41.0 GENERALIZED ANXIETY DISORDER WITH PANIC ATTACKS: ICD-10-CM

## 2022-11-14 DIAGNOSIS — F41.1 GENERALIZED ANXIETY DISORDER WITH PANIC ATTACKS: ICD-10-CM

## 2022-11-16 RX ORDER — ALPRAZOLAM 1 MG/1
TABLET ORAL
Qty: 60 TABLET | Refills: 0 | Status: SHIPPED | OUTPATIENT
Start: 2022-11-16 | End: 2022-12-16

## 2022-11-16 NOTE — TELEPHONE ENCOUNTER
PDMP monitoring:  -No significant or noteworthy irregularities noted.   -Last report:   Last PDMP Sigrid Moreno as Reviewed [facility]):  Review User Review Instant Review Result   [address] 11/16/2022  2:57 PM Reviewed PDMP [1]     Damon Ang [address]

## 2022-12-14 DIAGNOSIS — F41.1 GENERALIZED ANXIETY DISORDER WITH PANIC ATTACKS: ICD-10-CM

## 2022-12-14 DIAGNOSIS — F41.0 GENERALIZED ANXIETY DISORDER WITH PANIC ATTACKS: ICD-10-CM

## 2022-12-14 RX ORDER — ALPRAZOLAM 1 MG/1
TABLET ORAL
Qty: 60 TABLET | Refills: 0 | Status: SHIPPED | OUTPATIENT
Start: 2022-12-14 | End: 2023-01-13

## 2022-12-31 NOTE — PROGRESS NOTES
Kristine Ramirez   47 y.o. female   1975    HPI:    Patient was last seen by me on 9/15/2022.  During our last office visit:   -I continued her current medical regiment (listed below) for management of diabetes and chronic migraines.   -I started patient on Desvenlafaxine 25 mg once daily for treatment of generalized anxiety and depression.    Reason(s) for visit:   Chief Complaint   Patient presents with   • Diabetes   • Discuss Medications     Pt restarted Lisinopril. BP has been running high at dialysis.      Patient was accompanied by her  today.    Type II diabetes mellitus:  -Last A1c =    Lab Results   Component Value Date    LABA1C 7.5 09/05/2022    LABA1C 6.7 01/05/2022    LABA1C 6.6 01/04/2022       Labs Renal Latest Ref Rng & Units 9/7/2022 9/6/2022 9/5/2022 9/2/2022 3/7/2022   BUN 7 - 20 mg/dL 55(H) 80(H) 72(H) 51(H) 70(H)   Cr 0.6 - 1.1 mg/dL 8.1(HH) 10.5(HH) 10.3(HH) 7.0(HH) 9.0(HH)   K 3.5 - 5.1 mmol/L 4.2 4.4 4.0 3.9 4.9   Na 136 - 145 mmol/L 136 137 135(L) 136 134(L)       Lab Results   Component Value Date/Time    LABMICR YES 02/20/2022 01:35 PM    LABMICR YES 01/15/2022 04:55 AM    LABMICR YES 01/04/2022 04:05 PM    LABCREA 50.9 08/28/2021 08:30 AM    LABCREA 48.1 11/30/2020 12:55 PM    LABCREA 148.7 08/20/2020 02:26 PM    MALBCR 5275.0 08/28/2021 08:30 AM    MALBCR 18.0 11/09/2011 02:27 PM    MALBCR 4.5 10/06/2010 08:57 PM       Lab Results   Component Value Date    LDLCALC 92 02/24/2021    LDLDIRECT 100 (H) 04/18/2011     -Glucose readings (on average):   [] Fasting:   [] Lunchtime:   [] Dinnertime:   [x] Hasn't checked glucose readings  -Glucose reading  [] Low:  [] Max:  -Neuropathy symptoms: [] Yes [x] No  -Gastroparesis symptoms: [] Yes [x] No   -Visual disturbances: [x] Yes [] No  -Eye exam:   -Last one -  [] Wears glasses/contact lenses   -Medication adherence: [x] Yes [] No  -Other comments: patient has been off her Trulicity over past 3 weeks.    HTN:  BP Readings from Last  3 Encounters:   01/05/23 130/88   09/15/22 118/62   09/13/22 126/84     -She reported that since our last office visit, her BP has gone up significantly as high as 's. Lisinopril was added and is now taking 20 mg once daily. During dialysis period, she was 140-170's. -Patient denied issues with frequent headache, chest pain, shortness of breath, palpitations, malaise, etc.    ESRD on dialysis:  -Managed by nephrology. Anxiety/depression:  -Meds tried: Sertraline, Fluoxetine, Fluvoxamine, Xanax, Bupropion, Alprazolam, Viibryd. -GeneSight test done in Dec 2021.  -Happy with Alprazolam 2 mg ER. Updates:  -Patient stated that she was on Desvenlafaxine for well over a month and it's working well for her, but her insurance wouldn't cover it. The PA was eventually approved and has just started taking it again a few days ago. -Patient prefers Alprazolam CR to IR because      Chronic migraines:  -Meds tried: Gordiane Carota. Can't used NSAID because of ESRD. Updates:  -She has been on [place] for about 2 months - she stated use to have migraines 5 days a week and now 2 days a week and are less severe, but duration is the same (about 12-24 hrs). -She use to have \"20 migraines a month\" prior to taking Cyrus Rana and now down 5 migraines while on it. She stated the average migraine pain severity was a 7 out of 10 prior to taking Cyrus Rana and now a 2 out of 10. COPD:  -Stable on current regiment. Allergies   Allergen Reactions    Amoxicillin Hives, Itching and Other (See Comments)     Tolerates cephalosporins  Patient tolerating cefazolin (ANCEF) as of October 11, 2018      Levofloxacin Anaphylaxis    Vancomycin Anaphylaxis, Hives and Shortness Of Breath    Tape [Adhesive Tape] Other (See Comments)     Paper tape turns skin bright red. Plastic tape okay.         Current Outpatient Medications on File Prior to Visit   Medication Sig Dispense Refill    furosemide (LASIX) 80 MG tablet Take 80 mg by mouth daily       [employer] PEN NEEDLES 31G 31G X 8 MM MISC       Handicap Placard MISC by Does not apply route Expires 5/26/2027 1 each 0     No current facility-administered medications on file prior to visit.         Family History   Problem Relation Age of Onset    Diabetes Mother    Purvi Pruitt Other Mother 79        Covid    Diabetes Father     High Blood Pressure Father     Colon Cancer Father     Diabetes Sister     Alcohol Abuse Maternal Grandfather     Diabetes Paternal Grandmother     Alcohol Abuse Paternal Grandfather     Diabetes Paternal Aunt     Diabetes Paternal Uncle        Social History     Tobacco Use    Smoking status: Every Day     Packs/day: 1.00     Types: Cigarettes    Smokeless tobacco: Never    Tobacco comments:     Quit in August 2021- started back up   Substance Use Topics    Alcohol use: No     Comment: Very Rare        Lab Results   Component Value Date    WBC 11.1 (H) 09/07/2022    HGB 8.7 (L) 09/07/2022    HCT 26.4 (L) 09/07/2022    MCV 92.4 09/07/2022     09/07/2022         Chemistry        Component Value Date/Time     09/07/2022 0601    K 4.2 09/07/2022 0601    K 4.0 09/05/2022 1513    CL 97 (L) 09/07/2022 0601    CO2 22 09/07/2022 0601    BUN 55 (H) 09/07/2022 0601    CREATININE 8.1 (HH) 09/07/2022 0601        Component Value Date/Time    CALCIUM 8.7 09/07/2022 0601    ALKPHOS 137 (H) 09/05/2022 1513    AST 15 09/05/2022 1513    ALT 6 (L) 09/05/2022 1513    BILITOT 0.3 09/05/2022 1513          Lab Results   Component Value Date    ALT 6 (L) 09/05/2022    AST 15 09/05/2022    ALKPHOS 137 (H) 09/05/2022    BILITOT 0.3 09/05/2022       Lab Results   Component Value Date    CHOL 164 02/24/2021    CHOL 219 (H) 08/28/2020    CHOL 184 11/09/2011     Lab Results   Component Value Date    TRIG 319 (H) 08/31/2021    TRIG 157 (H) 02/24/2021    TRIG 223 (H) 08/28/2020     Lab Results   Component Value Date    HDL 41 02/24/2021    HDL 37 (L) 08/28/2020    HDL 34 (L) 11/09/2011     Lab Results   Component Value Date    LDLCALC 92 02/24/2021    LDLCALC 137 (H) 08/28/2020    LDLCALC 90 10/06/2010     Lab Results   Component Value Date    LABVLDL 31 02/24/2021    LABVLDL 45 08/28/2020    LABVLDL 70 11/09/2011     No results found for: CHOLHDLRATIO      Review of Systems   Constitutional:  Negative for activity change, appetite change, fatigue, fever and unexpected weight change. HENT:  Negative for congestion, rhinorrhea, sinus pressure and trouble swallowing. Respiratory:  Negative for cough, chest tightness, shortness of breath and wheezing. Cardiovascular:  Negative for chest pain, palpitations and leg swelling. Gastrointestinal:  Negative for abdominal distention, abdominal pain, blood in stool, constipation, diarrhea, nausea and vomiting. Genitourinary:  Negative for dysuria, frequency and hematuria. Musculoskeletal:  Positive for arthralgias. Negative for back pain. Skin:  Negative for rash. Neurological:  Positive for headaches. Negative for dizziness, weakness, light-headedness and numbness. Psychiatric/Behavioral:  Negative for agitation, decreased concentration, dysphoric mood, sleep disturbance and suicidal ideas. The patient is nervous/anxious. Wt Readings from Last 3 Encounters:   01/05/23 206 lb 14.4 oz (93.8 kg)   09/15/22 204 lb 6.4 oz (92.7 kg)   09/13/22 215 lb 6.2 oz (97.7 kg)       BP Readings from Last 3 Encounters:   01/05/23 130/88   09/15/22 118/62   09/13/22 126/84       Pulse Readings from Last 3 Encounters:   01/05/23 93   09/15/22 (!) 109   09/07/22 (!) 101       /88   Pulse 93   Temp 97.7 °F (36.5 °C)   Wt 206 lb 14.4 oz (93.8 kg)   SpO2 100%   BMI 32.41 kg/m²      Physical Exam  Vitals reviewed. Constitutional:       General: She is awake. She is not in acute distress. Appearance: She is obese. She is not ill-appearing or diaphoretic. HENT:      Head: Normocephalic and atraumatic. No abrasion or masses. Hair is normal.      Right Ear: External ear normal.      Left Ear: External ear normal.      Nose: Nose normal.   Eyes:      General: Lids are normal. Gaze aligned appropriately. No scleral icterus. Right eye: No discharge. Left eye: No discharge. Extraocular Movements: Extraocular movements intact. Conjunctiva/sclera: Conjunctivae normal.   Neck:      Trachea: Phonation normal.   Cardiovascular:      Rate and Rhythm: Normal rate and regular rhythm. Pulmonary:      Effort: Pulmonary effort is normal. No respiratory distress. Breath sounds: No wheezing, rhonchi or rales. Abdominal:      General: Abdomen is flat. There is no distension. Palpations: Abdomen is soft. Musculoskeletal:         General: No deformity. Normal range of motion. Cervical back: Normal range of motion. No erythema. Right lower leg: No edema. Left lower leg: No edema. Skin:     Coloration: Skin is not cyanotic, jaundiced or pale. Findings: No abrasion, abscess, bruising, ecchymosis, erythema, signs of injury, laceration, lesion, petechiae, rash or wound. Neurological:      General: No focal deficit present. Mental Status: She is alert. Mental status is at baseline. GCS: GCS eye subscore is 4. GCS verbal subscore is 5. GCS motor subscore is 6. Cranial Nerves: No cranial nerve deficit, dysarthria or facial asymmetry. Motor: No weakness, tremor, atrophy or seizure activity. Coordination: Coordination normal.      Gait: Gait is intact. Psychiatric:         Attention and Perception: Attention and perception normal.         Mood and Affect: Mood and affect normal.         Speech: Speech normal.         Behavior: Behavior normal. Behavior is cooperative. Thought Content: Thought content normal.        Assessment/Plan:   Christie Ruano was seen today for diabetes and discuss medications.     Diagnoses and all orders for this visit:    Type 2 DM with hypertension and ESRD on dialysis [facility])  Comments:  BP are uncontrolled. Will switch pt from Lisinopril to Olmesartan for better BP control. Orders:  -     olmesartan (BENICAR) 20 MG tablet; Take 1 tablet by mouth nightly    Type 2 diabetes mellitus with diabetic polyneuropathy, with long-term current use of insulin (HCC)  Comments:  A1c overall acceptable. Continue current medical regiment. Currently there's a Trulicity shortage. Orders:  -     insulin lispro, 1 Unit Dial, (HUMALOG/ADMELOG) 100 UNIT/ML SOPN; Per sliding scale  -     pregabalin (LYRICA) 75 MG capsule; Take 1 capsule by mouth daily for 90 days. Max Daily Amount: 75 mg  -     Dulaglutide (TRULICITY) 3 TV/1.5NE SOPN; Inject 3 mg into the skin once a week  -     insulin glargine (LANTUS;BASAGLAR) 100 UNIT/ML injection pen; Inject 10 Units into the skin nightly  -     Comprehensive Metabolic Panel; Future  -     Hemoglobin A1C; Future  -     Hemoglobin A1C  -     Comprehensive Metabolic Panel    Type 2 diabetes mellitus with obesity (HCC)  -     Dulaglutide (TRULICITY) 3 TR/4.7WQ SOPN; Inject 3 mg into the skin once a week    Type 2 diabetes mellitus with hyperlipidemia (HCC)  -     atorvastatin (LIPITOR) 40 MG tablet; Take 1 tablet by mouth nightly    Chronic migraine with aura  Comments:  Stable on current medical regiment. Will continue Qulipta and Desvenlafaxine. Orders:  -     desvenlafaxine succinate (PRISTIQ) 25 MG TB24 extended release tablet; Take 1 tablet by mouth daily  -     Atogepant (QULIPTA) 10 MG TABS; Take 10 mg by mouth daily  -     rizatriptan (MAXALT) 10 MG tablet; Take 1 tablet by mouth once as needed for Migraine May repeat in 2 hours if needed    Generalized anxiety disorder with panic attacks  Comments:  Stable on Pristiq 25 mg once daily. Pt declined the need to increase the dose. Will try and switch back to Alprazolam CR because of pt preference. Orders:  -     desvenlafaxine succinate (PRISTIQ) 25 MG TB24 extended release tablet;  Take 1 tablet by mouth daily  -     ALPRAZolam (XANAX XR) 2 MG extended release tablet; Take 1 tablet by mouth every morning for 30 days. Max Daily Amount: 2 mg    Chronic obstructive pulmonary disease, unspecified COPD type ([facility]  Comments:  Stable on current medical regiment. Orders:  -     albuterol sulfate HFA (PROVENTIL;VENTOLIN;PROAIR) 108 (90 Base) MCG/ACT inhaler; Inhale 2 puffs into the lungs every 6 hours as needed for Wheezing or Shortness of Breath  -     tiotropium-olodaterol (STIOLTO RESPIMAT) 2.5-2.5 MCG/ACT AERS; Inhale 2 puffs into the lungs daily    Risk of myocardial infarction or stroke 7.5% or greater in next 10 years  -     aspirin 81 MG EC tablet; Take 1 tablet by mouth daily    I reviewed the plan of care with the patient. Patient acknowledged understanding and agreed with plan of care overall.     Medications Discontinued During This Encounter   Medication Reason    midodrine (PROAMATINE) 10 MG tablet DISCONTINUED BY ANOTHER CLINICIAN    lisinopril (PRINIVIL;ZESTRIL) 20 MG tablet Alternate therapy    ALPRAZolam (XANAX) 1 MG tablet Alternate therapy    aspirin 81 MG EC tablet REORDER    atorvastatin (LIPITOR) 40 MG tablet REORDER    tiotropium-olodaterol (STIOLTO RESPIMAT) 2.5-2.5 MCG/ACT AERS REORDER    Atogepant (QULIPTA) 10 MG TABS REORDER    pregabalin (LYRICA) 75 MG capsule REORDER    Dulaglutide (TRULICITY) 3 VD/4.0TB SOPN REORDER    insulin glargine (LANTUS;BASAGLAR) 100 UNIT/ML injection pen REORDER    albuterol sulfate HFA (PROVENTIL;VENTOLIN;PROAIR) 108 (90 Base) MCG/ACT inhaler REORDER    desvenlafaxine succinate (PRISTIQ) 25 MG TB24 extended release tablet REORDER    insulin lispro, 1 Unit Dial, 100 UNIT/ML SOPN REORDER    rizatriptan (MAXALT) 10 MG tablet REORDER    Methoxy PEG-Epoetin Beta (MIRCERA IJ) LIST CLEANUP        General information on medications:  -When it comes to medications, whether with starting or adding a new medication or increasing the dose of a current medication, the benefits and risks have to always be considered and weighed over, especially if one is taking other medications as well. -There are no medications that have no side effects and that there is always a risk involved with taking a medication.    -If a side effect were to occur with starting a new medication or with increasing the dose of a current medication that either the medication can be totally discontinued altogether or simply decrease the dose of it and if this would be the case a follow-up appointment would be deemed necessary.    -The drug allergy list will then be updated with the corresponding side effect(s) if it's deemed to be a true 'drug allergy'. -The most common adverse effects of medication(s) were addressed at today's visit.    -Lastly, the coverage status of a medication may vary from insurance to insurance and the only way to verify if the medication is covered is to send an actual prescription in.    -The drug formulary of each insurance changes without any warning or notification to the healthcare provider let alone the pharmacy.  -The cost of medications vary from insurance to insurance and the cost is always subject to change just like the drug formulary. Follow-up: No follow-ups on file. .     Patient was informed that if his or her symptoms worsen to follow up with me sooner or go to the nearest ER if the symptoms are very significant and warrant higher level of care. Regarding my note:  -This note was composed (by me only and not with assistance via a scribe) to the best of my knowledge and recollection of the encounter with the patient using one of my own customized note templates utilizing a combination of typing and dictating with the [address] [place] [address] speech recognition software.   As a result, the note may possibly contain various errors (e.g. spelling, grammar, and non-sensible words/phrases/statements) despite reviewing the note prior to signing it for completion. Time spent includes some or all of the following, both face-to-face time and non face-to-face time, but is not limited to:  [x] Preparing to see the patient by reviewing medical records available (notes, labs, imaging, etc.) prior to seeing the patient. [x] Obtaining and/or reviewing the history from the patient. [x] Performing a medically appropriate examination. [x] Ordering of relevant lab work, medications, referrals, or procedures. [x] Discussing patient's medical issues and formulating an assessment and plan. [x] Reviewing plan of care with patient. Answering any questions or concerns. [x] Documentation within the electronic health record (EHR)  [] Reviewing records of history relevant to patient's issues after seeing the patient. [] Discussion or coordination of care with other health care professionals  [x] Other: length office visit - 33 minutes.  -I spent a significant amount of time discussing various issues as noted above and also with formulating a treatment plan for each specific issue. Patient was given the opportunity to ask me any questions and address any concerns/issues. I also reviewed lab work (if available) as well as prior notes from PCP and/or other specialists if available. Damon La M.D.   [address] [place] [address]    Electronically signed by Dionicio Salter MD M.D. on 1/5/2023 at 6:01 PM.

## 2023-01-05 ENCOUNTER — OFFICE VISIT (OUTPATIENT)
Dept: FAMILY MEDICINE CLINIC | Age: 48
End: 2023-01-05
Payer: MEDICARE

## 2023-01-05 VITALS
WEIGHT: 206.9 LBS | SYSTOLIC BLOOD PRESSURE: 130 MMHG | TEMPERATURE: 97.7 F | DIASTOLIC BLOOD PRESSURE: 88 MMHG | BODY MASS INDEX: 32.41 KG/M2 | HEART RATE: 93 BPM | OXYGEN SATURATION: 100 %

## 2023-01-05 DIAGNOSIS — N18.6 TYPE 2 DM WITH HYPERTENSION AND ESRD ON DIALYSIS (HCC): Primary | ICD-10-CM

## 2023-01-05 DIAGNOSIS — Z99.2 TYPE 2 DM WITH HYPERTENSION AND ESRD ON DIALYSIS (HCC): Primary | ICD-10-CM

## 2023-01-05 DIAGNOSIS — F41.0 GENERALIZED ANXIETY DISORDER WITH PANIC ATTACKS: ICD-10-CM

## 2023-01-05 DIAGNOSIS — E78.5 TYPE 2 DIABETES MELLITUS WITH HYPERLIPIDEMIA (HCC): ICD-10-CM

## 2023-01-05 DIAGNOSIS — E66.9 TYPE 2 DIABETES MELLITUS WITH OBESITY (HCC): ICD-10-CM

## 2023-01-05 DIAGNOSIS — E11.42 TYPE 2 DIABETES MELLITUS WITH DIABETIC POLYNEUROPATHY, WITH LONG-TERM CURRENT USE OF INSULIN (HCC): ICD-10-CM

## 2023-01-05 DIAGNOSIS — E11.69 TYPE 2 DIABETES MELLITUS WITH OBESITY (HCC): ICD-10-CM

## 2023-01-05 DIAGNOSIS — E11.22 TYPE 2 DM WITH HYPERTENSION AND ESRD ON DIALYSIS (HCC): Primary | ICD-10-CM

## 2023-01-05 DIAGNOSIS — G43.109 CHRONIC MIGRAINE WITH AURA: ICD-10-CM

## 2023-01-05 DIAGNOSIS — E11.69 TYPE 2 DIABETES MELLITUS WITH HYPERLIPIDEMIA (HCC): ICD-10-CM

## 2023-01-05 DIAGNOSIS — F41.1 GENERALIZED ANXIETY DISORDER WITH PANIC ATTACKS: ICD-10-CM

## 2023-01-05 DIAGNOSIS — Z79.4 TYPE 2 DIABETES MELLITUS WITH DIABETIC POLYNEUROPATHY, WITH LONG-TERM CURRENT USE OF INSULIN (HCC): ICD-10-CM

## 2023-01-05 DIAGNOSIS — I12.0 TYPE 2 DM WITH HYPERTENSION AND ESRD ON DIALYSIS (HCC): Primary | ICD-10-CM

## 2023-01-05 DIAGNOSIS — Z91.89 RISK OF MYOCARDIAL INFARCTION OR STROKE 7.5% OR GREATER IN NEXT 10 YEARS: ICD-10-CM

## 2023-01-05 DIAGNOSIS — J44.9 CHRONIC OBSTRUCTIVE PULMONARY DISEASE, UNSPECIFIED COPD TYPE (HCC): ICD-10-CM

## 2023-01-05 PROCEDURE — 2022F DILAT RTA XM EVC RTNOPTHY: CPT | Performed by: FAMILY MEDICINE

## 2023-01-05 PROCEDURE — 99214 OFFICE O/P EST MOD 30 MIN: CPT | Performed by: FAMILY MEDICINE

## 2023-01-05 PROCEDURE — 3079F DIAST BP 80-89 MM HG: CPT | Performed by: FAMILY MEDICINE

## 2023-01-05 PROCEDURE — 3046F HEMOGLOBIN A1C LEVEL >9.0%: CPT | Performed by: FAMILY MEDICINE

## 2023-01-05 PROCEDURE — G8484 FLU IMMUNIZE NO ADMIN: HCPCS | Performed by: FAMILY MEDICINE

## 2023-01-05 PROCEDURE — G8427 DOCREV CUR MEDS BY ELIG CLIN: HCPCS | Performed by: FAMILY MEDICINE

## 2023-01-05 PROCEDURE — 4004F PT TOBACCO SCREEN RCVD TLK: CPT | Performed by: FAMILY MEDICINE

## 2023-01-05 PROCEDURE — 3023F SPIROM DOC REV: CPT | Performed by: FAMILY MEDICINE

## 2023-01-05 PROCEDURE — G8417 CALC BMI ABV UP PARAM F/U: HCPCS | Performed by: FAMILY MEDICINE

## 2023-01-05 PROCEDURE — 3075F SYST BP GE 130 - 139MM HG: CPT | Performed by: FAMILY MEDICINE

## 2023-01-05 RX ORDER — OLMESARTAN MEDOXOMIL 20 MG/1
20 TABLET ORAL NIGHTLY
Qty: 30 TABLET | Refills: 5 | Status: SHIPPED | OUTPATIENT
Start: 2023-01-05

## 2023-01-05 RX ORDER — RIZATRIPTAN BENZOATE 10 MG/1
10 TABLET ORAL
COMMUNITY
End: 2023-01-05 | Stop reason: SDUPTHER

## 2023-01-05 RX ORDER — ATOGEPANT 10 MG/1
10 TABLET ORAL DAILY
Qty: 30 TABLET | Refills: 11 | Status: SHIPPED | OUTPATIENT
Start: 2023-01-05

## 2023-01-05 RX ORDER — ALPRAZOLAM 2 MG/1
2 TABLET, EXTENDED RELEASE ORAL EVERY MORNING
Qty: 30 TABLET | Refills: 0 | Status: SHIPPED | OUTPATIENT
Start: 2023-01-05 | End: 2023-02-04

## 2023-01-05 RX ORDER — PREGABALIN 75 MG/1
75 CAPSULE ORAL DAILY
Qty: 90 CAPSULE | Refills: 0 | Status: SHIPPED | OUTPATIENT
Start: 2023-01-05 | End: 2023-04-05

## 2023-01-05 RX ORDER — ATORVASTATIN CALCIUM 40 MG/1
40 TABLET, FILM COATED ORAL NIGHTLY
Qty: 30 TABLET | Refills: 11 | Status: SHIPPED | OUTPATIENT
Start: 2023-01-05

## 2023-01-05 RX ORDER — FUROSEMIDE 80 MG
80 TABLET ORAL DAILY
Qty: 60 TABLET | Status: CANCELLED | OUTPATIENT
Start: 2023-01-05

## 2023-01-05 RX ORDER — LISINOPRIL 20 MG/1
TABLET ORAL
COMMUNITY
Start: 2022-12-13 | End: 2023-01-05 | Stop reason: ALTCHOICE

## 2023-01-05 RX ORDER — ALBUTEROL SULFATE 90 UG/1
2 AEROSOL, METERED RESPIRATORY (INHALATION) EVERY 6 HOURS PRN
Qty: 18 G | Refills: 2 | Status: SHIPPED | OUTPATIENT
Start: 2023-01-05

## 2023-01-05 RX ORDER — DESVENLAFAXINE 25 MG/1
25 TABLET, EXTENDED RELEASE ORAL DAILY
Qty: 30 TABLET | Refills: 5 | Status: SHIPPED | OUTPATIENT
Start: 2023-01-05

## 2023-01-05 RX ORDER — DULAGLUTIDE 3 MG/.5ML
3 INJECTION, SOLUTION SUBCUTANEOUS WEEKLY
Qty: 4 ADJUSTABLE DOSE PRE-FILLED PEN SYRINGE | Refills: 3 | Status: SHIPPED | OUTPATIENT
Start: 2023-01-05

## 2023-01-05 RX ORDER — INSULIN LISPRO 100 [IU]/ML
INJECTION, SOLUTION INTRAVENOUS; SUBCUTANEOUS
Qty: 3 ADJUSTABLE DOSE PRE-FILLED PEN SYRINGE | Refills: 5 | Status: SHIPPED | OUTPATIENT
Start: 2023-01-05

## 2023-01-05 RX ORDER — ALPRAZOLAM 1 MG/1
TABLET ORAL
Qty: 60 TABLET | Refills: 0 | Status: CANCELLED | OUTPATIENT
Start: 2023-01-05 | End: 2023-02-04

## 2023-01-05 RX ORDER — RIZATRIPTAN BENZOATE 10 MG/1
10 TABLET ORAL
Qty: 9 TABLET | Refills: 2 | Status: SHIPPED | OUTPATIENT
Start: 2023-01-05 | End: 2023-01-05

## 2023-01-05 RX ORDER — ASPIRIN 81 MG/1
81 TABLET ORAL DAILY
Qty: 30 TABLET | Refills: 11 | Status: SHIPPED | OUTPATIENT
Start: 2023-01-05

## 2023-01-05 ASSESSMENT — ENCOUNTER SYMPTOMS
RHINORRHEA: 0
COUGH: 0
ABDOMINAL PAIN: 0
SHORTNESS OF BREATH: 0
NAUSEA: 0
CHEST TIGHTNESS: 0
BACK PAIN: 0
CONSTIPATION: 0
VOMITING: 0
DIARRHEA: 0
ABDOMINAL DISTENTION: 0
WHEEZING: 0
TROUBLE SWALLOWING: 0
SINUS PRESSURE: 0
BLOOD IN STOOL: 0

## 2023-01-05 ASSESSMENT — PATIENT HEALTH QUESTIONNAIRE - PHQ9
5. POOR APPETITE OR OVEREATING: 0
SUM OF ALL RESPONSES TO PHQ QUESTIONS 1-9: 2
7. TROUBLE CONCENTRATING ON THINGS, SUCH AS READING THE NEWSPAPER OR WATCHING TELEVISION: 0
6. FEELING BAD ABOUT YOURSELF - OR THAT YOU ARE A FAILURE OR HAVE LET YOURSELF OR YOUR FAMILY DOWN: 0
SUM OF ALL RESPONSES TO PHQ9 QUESTIONS 1 & 2: 2
SUM OF ALL RESPONSES TO PHQ QUESTIONS 1-9: 2
1. LITTLE INTEREST OR PLEASURE IN DOING THINGS: 0
8. MOVING OR SPEAKING SO SLOWLY THAT OTHER PEOPLE COULD HAVE NOTICED. OR THE OPPOSITE, BEING SO FIGETY OR RESTLESS THAT YOU HAVE BEEN MOVING AROUND A LOT MORE THAN USUAL: 0
3. TROUBLE FALLING OR STAYING ASLEEP: 0
2. FEELING DOWN, DEPRESSED OR HOPELESS: 2
9. THOUGHTS THAT YOU WOULD BE BETTER OFF DEAD, OR OF HURTING YOURSELF: 0
4. FEELING TIRED OR HAVING LITTLE ENERGY: 0
SUM OF ALL RESPONSES TO PHQ QUESTIONS 1-9: 2
SUM OF ALL RESPONSES TO PHQ QUESTIONS 1-9: 2

## 2023-01-06 LAB
A/G RATIO: 1.5 (ref 1.1–2.2)
ALBUMIN SERPL-MCNC: 3.8 G/DL (ref 3.4–5)
ALP BLD-CCNC: 112 U/L (ref 40–129)
ALT SERPL-CCNC: 6 U/L (ref 10–40)
ANION GAP SERPL CALCULATED.3IONS-SCNC: 10 MMOL/L (ref 3–16)
AST SERPL-CCNC: 11 U/L (ref 15–37)
BILIRUB SERPL-MCNC: 0.4 MG/DL (ref 0–1)
BUN BLDV-MCNC: 22 MG/DL (ref 7–20)
CALCIUM SERPL-MCNC: 9.5 MG/DL (ref 8.3–10.6)
CHLORIDE BLD-SCNC: 95 MMOL/L (ref 99–110)
CO2: 34 MMOL/L (ref 21–32)
CREAT SERPL-MCNC: 4.3 MG/DL (ref 0.6–1.1)
ESTIMATED AVERAGE GLUCOSE: 137 MG/DL
GFR SERPL CREATININE-BSD FRML MDRD: 12 ML/MIN/{1.73_M2}
GLUCOSE BLD-MCNC: 221 MG/DL (ref 70–99)
HBA1C MFR BLD: 6.4 %
POTASSIUM SERPL-SCNC: 5.1 MMOL/L (ref 3.5–5.1)
SODIUM BLD-SCNC: 139 MMOL/L (ref 136–145)
TOTAL PROTEIN: 6.3 G/DL (ref 6.4–8.2)

## 2023-01-12 ENCOUNTER — TELEPHONE (OUTPATIENT)
Dept: ORTHOPEDIC SURGERY | Age: 48
End: 2023-01-12

## 2023-01-12 NOTE — TELEPHONE ENCOUNTER
Submitted PA for Qulipta 10MG tablets Via CMM Key: [id number] STATUS: APPROVED 01/12/23 until further notice; Approval letter attached.    If this requires a response please respond to the pool ( P MHCX PSC MEDICATION PRE-AUTH).      Thank you please advise patient.

## 2023-02-03 RX ORDER — ALPRAZOLAM 1 MG/1
TABLET ORAL
Qty: 60 TABLET | OUTPATIENT
Start: 2023-02-03

## 2023-02-03 NOTE — TELEPHONE ENCOUNTER
The prescription refill request I received was for alprazolam 1 mg tablet - immediate release.  Patient was switched from Alprazolam IR to Alprazolam XR (controlled release) on 1/5/2023 as noted below.    ALPRAZolam (XANAX XR) 2 MG extended release tablet 30 tablet 0 1/5/2023 2/4/2023    Sig - Route: Take 1 tablet by mouth every morning for 30 days. Max Daily Amount: 2 mg - Oral    Sent to pharmacy as: ALPRAZolam ER 2 MG Oral Tablet Extended Release 24 Hour (XANAX XR)    Notes to Pharmacy: Switch to Alprazolam 2 mg ER once daily    E-Prescribing Status: Receipt confirmed by pharmacy (1/5/2023  3:20 PM EST)      Please clarify with patient if she would like to continue Alprazolam 2 XR or go back to Alprazolam 1 mg IR BID PRN for anxiety?    Damon Mireles MD  Family Medicine  [facility]

## 2023-02-10 NOTE — TELEPHONE ENCOUNTER
Pt called and clarified that her insurance won't cover the ALPRAZolam ER 2 MG so she wants to go back to the to Alprazolam 1 mg IR BID PRN for anxiety.

## 2023-02-13 DIAGNOSIS — F41.1 GENERALIZED ANXIETY DISORDER WITH PANIC ATTACKS: ICD-10-CM

## 2023-02-13 DIAGNOSIS — F41.0 GENERALIZED ANXIETY DISORDER WITH PANIC ATTACKS: ICD-10-CM

## 2023-02-13 RX ORDER — ALPRAZOLAM 2 MG/1
2 TABLET, EXTENDED RELEASE ORAL EVERY MORNING
Qty: 30 TABLET | Refills: 0 | Status: SHIPPED | OUTPATIENT
Start: 2023-02-13 | End: 2023-03-15

## 2023-02-13 NOTE — TELEPHONE ENCOUNTER
Pt is requesting for refill on the following medication, dosage was raised during last appointment but not found on medication chart. Please fill and advise.       ALPRAZolam (XANAX XR) 2 MG extended release tablet [[id number]]  ENDED    Dose: 2 mg Route: Oral Frequency: EVERY MORNING   Dispense Quantity: 30 tablet Refills: 0    Note to Pharmacy: Switch to Alprazolam 2 mg ER once daily             LAST OV: 01/05/23  NEXT OV: 04/06/23  LAST FILLED: 01/05/23

## 2023-02-14 NOTE — TELEPHONE ENCOUNTER
Contact the patient again please. I received a rx for Alprazolam 2 mg CR just yesterday (Feb 13th). I am unsure if she requested that or the pharmacy? I approved it. Damon Ang 177

## 2023-02-15 NOTE — TELEPHONE ENCOUNTER
Insurance won't cover the Xanax 2mg ER that was sent on 2/13 so she wants to go back to the to Alprazolam 1 mg BID PRN for anxiety. This hasn't been sent since 12/14/22.

## 2023-02-16 NOTE — TELEPHONE ENCOUNTER
Per PA department:     Submitted PA for ALPRAZolam XR 2MG er tablets  Via CMM Key: [id number] STATUS: APPROVED 02/13/23 until further notice; Approval letter attached. Pt was notified and verbalized understanding.

## 2023-03-15 DIAGNOSIS — F41.1 GENERALIZED ANXIETY DISORDER WITH PANIC ATTACKS: ICD-10-CM

## 2023-03-15 DIAGNOSIS — F41.0 GENERALIZED ANXIETY DISORDER WITH PANIC ATTACKS: ICD-10-CM

## 2023-03-15 RX ORDER — ALPRAZOLAM 2 MG/1
TABLET, EXTENDED RELEASE ORAL
Qty: 30 TABLET | Refills: 0 | Status: SHIPPED | OUTPATIENT
Start: 2023-03-15 | End: 2023-04-14

## 2023-04-01 NOTE — PROGRESS NOTES
pregabalin (LYRICA) 75 MG capsule     insulin glargine (LANTUS;BASAGLAR) 100 UNIT/ML injection pen REORDER        General information on medications:  -When it comes to medications, whether with starting or adding a new medication or increasing the dose of a current medication, the benefits and risks have to always be considered and weighed over, especially if one is taking other medications as well. -There are no medications that have no side effects and that there is always a risk involved with taking a medication.    -If a side effect were to occur with starting a new medication or with increasing the dose of a current medication that either the medication can be totally discontinued altogether or simply decrease the dose of it and if this would be the case a follow-up appointment would be deemed necessary.    -The drug allergy list will then be updated with the corresponding side effect(s) if it's deemed to be a true 'drug allergy'. -The most common adverse effects of medication(s) were addressed at today's visit.    -Lastly, the coverage status of a medication may vary from insurance to insurance and the only way to verify if the medication is covered is to send an actual prescription in.    -The drug formulary of each insurance changes without any warning or notification to the healthcare provider let alone the pharmacy.  -The cost of medications vary from insurance to insurance and the cost is always subject to change just like the drug formulary. Follow-up: Return in about 3 months (around 7/6/2023) for chronic health issues, refill of controlled med, A1c check - diabetes. .     Patient was informed that if his or her symptoms worsen to follow up with me sooner or go to the nearest ER if the symptoms are very significant and warrant higher level of care.     Regarding my note:  -This note was composed (by me only and not with assistance via a scribe) to the best of my knowledge and recollection of the

## 2023-04-06 ENCOUNTER — OFFICE VISIT (OUTPATIENT)
Dept: FAMILY MEDICINE CLINIC | Age: 48
End: 2023-04-06

## 2023-04-06 VITALS
WEIGHT: 195 LBS | BODY MASS INDEX: 30.54 KG/M2 | OXYGEN SATURATION: 93 % | HEART RATE: 97 BPM | DIASTOLIC BLOOD PRESSURE: 88 MMHG | SYSTOLIC BLOOD PRESSURE: 166 MMHG

## 2023-04-06 DIAGNOSIS — J44.9 CHRONIC OBSTRUCTIVE PULMONARY DISEASE, UNSPECIFIED COPD TYPE (HCC): ICD-10-CM

## 2023-04-06 DIAGNOSIS — Z79.4 TYPE 2 DIABETES MELLITUS WITH DIABETIC POLYNEUROPATHY, WITH LONG-TERM CURRENT USE OF INSULIN (HCC): ICD-10-CM

## 2023-04-06 DIAGNOSIS — E11.3523 BOTH EYES AFFECTED BY PROLIFERATIVE DIABETIC RETINOPATHY WITH TRACTION RETINAL DETACHMENTS INVOLVING MACULAE, ASSOCIATED WITH TYPE 2 DIABETES MELLITUS (HCC): ICD-10-CM

## 2023-04-06 DIAGNOSIS — G43.109 CHRONIC MIGRAINE WITH AURA: ICD-10-CM

## 2023-04-06 DIAGNOSIS — Z12.11 COLON CANCER SCREENING: ICD-10-CM

## 2023-04-06 DIAGNOSIS — Z99.89 DEPENDENT ON WALKER FOR AMBULATION: ICD-10-CM

## 2023-04-06 DIAGNOSIS — N18.6 TYPE 2 DM WITH HYPERTENSION AND ESRD ON DIALYSIS (HCC): Primary | ICD-10-CM

## 2023-04-06 DIAGNOSIS — Z13.0 SCREENING FOR ENDOCRINE, NUTRITIONAL, METABOLIC AND IMMUNITY DISORDER: ICD-10-CM

## 2023-04-06 DIAGNOSIS — Z13.21 SCREENING FOR ENDOCRINE, NUTRITIONAL, METABOLIC AND IMMUNITY DISORDER: ICD-10-CM

## 2023-04-06 DIAGNOSIS — I12.0 TYPE 2 DM WITH HYPERTENSION AND ESRD ON DIALYSIS (HCC): Primary | ICD-10-CM

## 2023-04-06 DIAGNOSIS — Z91.89 RISK OF MYOCARDIAL INFARCTION OR STROKE 7.5% OR GREATER IN NEXT 10 YEARS: ICD-10-CM

## 2023-04-06 DIAGNOSIS — E11.42 TYPE 2 DIABETES MELLITUS WITH DIABETIC POLYNEUROPATHY, WITH LONG-TERM CURRENT USE OF INSULIN (HCC): ICD-10-CM

## 2023-04-06 DIAGNOSIS — E78.5 TYPE 2 DIABETES MELLITUS WITH HYPERLIPIDEMIA (HCC): ICD-10-CM

## 2023-04-06 DIAGNOSIS — Z13.228 SCREENING FOR ENDOCRINE, NUTRITIONAL, METABOLIC AND IMMUNITY DISORDER: ICD-10-CM

## 2023-04-06 DIAGNOSIS — E11.69 TYPE 2 DIABETES MELLITUS WITH OBESITY (HCC): ICD-10-CM

## 2023-04-06 DIAGNOSIS — E66.9 TYPE 2 DIABETES MELLITUS WITH OBESITY (HCC): ICD-10-CM

## 2023-04-06 DIAGNOSIS — Z99.2 TYPE 2 DM WITH HYPERTENSION AND ESRD ON DIALYSIS (HCC): Primary | ICD-10-CM

## 2023-04-06 DIAGNOSIS — E11.69 TYPE 2 DIABETES MELLITUS WITH HYPERLIPIDEMIA (HCC): ICD-10-CM

## 2023-04-06 DIAGNOSIS — Z13.29 SCREENING FOR ENDOCRINE, NUTRITIONAL, METABOLIC AND IMMUNITY DISORDER: ICD-10-CM

## 2023-04-06 DIAGNOSIS — E11.22 TYPE 2 DM WITH HYPERTENSION AND ESRD ON DIALYSIS (HCC): Primary | ICD-10-CM

## 2023-04-06 DIAGNOSIS — E77.8 HYPOPROTEINEMIA (HCC): ICD-10-CM

## 2023-04-06 DIAGNOSIS — Z12.31 BREAST CANCER SCREENING BY MAMMOGRAM: ICD-10-CM

## 2023-04-06 PROBLEM — Z99.3 WHEELCHAIR DEPENDENCE: Status: ACTIVE | Noted: 2023-04-06

## 2023-04-06 RX ORDER — CLONIDINE HYDROCHLORIDE 0.1 MG/1
0.1 TABLET ORAL EVERY 8 HOURS PRN
Qty: 180 TABLET | Refills: 0 | Status: SHIPPED | OUTPATIENT
Start: 2023-04-06

## 2023-04-06 RX ORDER — PREGABALIN 75 MG/1
75 CAPSULE ORAL DAILY
Qty: 270 CAPSULE | Refills: 0 | Status: SHIPPED | OUTPATIENT
Start: 2023-04-06 | End: 2023-07-05

## 2023-04-06 ASSESSMENT — ENCOUNTER SYMPTOMS
RHINORRHEA: 0
TROUBLE SWALLOWING: 0
ABDOMINAL DISTENTION: 0
BLOOD IN STOOL: 0
CHEST TIGHTNESS: 0
VOMITING: 0
BACK PAIN: 0
ABDOMINAL PAIN: 0
WHEEZING: 0
CONSTIPATION: 0
SHORTNESS OF BREATH: 0
COUGH: 0
NAUSEA: 0
DIARRHEA: 0
SINUS PRESSURE: 0

## 2023-04-06 NOTE — PATIENT INSTRUCTIONS
I would take Amlodipine 5 mg for at least 1-2 weeks and if still SBP > 140 or DBP > 100 then take 2 tablets of Amlodipine 5 mg.

## 2023-04-17 DIAGNOSIS — F41.1 GENERALIZED ANXIETY DISORDER WITH PANIC ATTACKS: ICD-10-CM

## 2023-04-17 DIAGNOSIS — F41.0 GENERALIZED ANXIETY DISORDER WITH PANIC ATTACKS: ICD-10-CM

## 2023-04-17 NOTE — TELEPHONE ENCOUNTER
Pt requesting a medication refill   ALPRAZOLAM XR 2 MG extended release tablet 30 tablet 0 3/15/2023 4/14/2023    Sig: TAKE ONE TABLET BY MOUTH EVERY MORNING FOR 30 DAYS    Pt stated she took her last one on 4/14/2023  Lov 4/6/2023  Nov 7/6/2023  Last filled 3/15/2023

## 2023-04-18 RX ORDER — ALPRAZOLAM 2 MG/1
TABLET, EXTENDED RELEASE ORAL
Qty: 30 TABLET | Refills: 0 | Status: SHIPPED | OUTPATIENT
Start: 2023-04-18 | End: 2023-05-18

## 2023-04-18 NOTE — TELEPHONE ENCOUNTER
PDMP monitoring:  -No significant or noteworthy irregularities noted.   -Last report:     Last PDMP Severo Arm as Reviewed [facility]):  Review User Review Instant Review Result   CIERRA JAIMES 4/18/2023 11:51 AM Reviewed PDMP [1]       Cierra Toth

## 2023-04-26 ENCOUNTER — APPOINTMENT (OUTPATIENT)
Dept: CT IMAGING | Age: 48
End: 2023-04-26
Payer: MEDICARE

## 2023-04-26 ENCOUNTER — HOSPITAL ENCOUNTER (INPATIENT)
Age: 48
LOS: 2 days | Discharge: HOME | End: 2023-04-28
Attending: EMERGENCY MEDICINE | Admitting: INTERNAL MEDICINE
Payer: MEDICARE

## 2023-04-26 ENCOUNTER — APPOINTMENT (OUTPATIENT)
Dept: GENERAL RADIOLOGY | Age: 48
End: 2023-04-26
Payer: MEDICARE

## 2023-04-26 DIAGNOSIS — Z99.2 ESRD ON HEMODIALYSIS (HCC): ICD-10-CM

## 2023-04-26 DIAGNOSIS — E87.5 HYPERKALEMIA: ICD-10-CM

## 2023-04-26 DIAGNOSIS — N18.6 ESRD ON HEMODIALYSIS (HCC): ICD-10-CM

## 2023-04-26 DIAGNOSIS — M54.50 ACUTE MIDLINE LOW BACK PAIN WITHOUT SCIATICA: ICD-10-CM

## 2023-04-26 DIAGNOSIS — R53.1 GENERAL WEAKNESS: Primary | ICD-10-CM

## 2023-04-26 DIAGNOSIS — N18.6 ESRD (END STAGE RENAL DISEASE) (HCC): ICD-10-CM

## 2023-04-26 LAB
ALBUMIN SERPL-MCNC: 4.2 G/DL (ref 3.4–5)
ALBUMIN/GLOB SERPL: 1.2 {RATIO} (ref 1.1–2.2)
ALP SERPL-CCNC: 143 U/L (ref 40–129)
ALT SERPL-CCNC: 70 U/L (ref 10–40)
ANION GAP SERPL CALCULATED.3IONS-SCNC: 21 MMOL/L (ref 3–16)
AST SERPL-CCNC: 107 U/L (ref 15–37)
BASOPHILS # BLD: 0 K/UL (ref 0–0.2)
BASOPHILS NFR BLD: 0.7 %
BILIRUB SERPL-MCNC: 0.9 MG/DL (ref 0–1)
BUN SERPL-MCNC: 57 MG/DL (ref 7–20)
CALCIUM SERPL-MCNC: 10.2 MG/DL (ref 8.3–10.6)
CHLORIDE SERPL-SCNC: 93 MMOL/L (ref 99–110)
CK SERPL-CCNC: 209 U/L (ref 26–192)
CO2 SERPL-SCNC: 18 MMOL/L (ref 21–32)
CREAT SERPL-MCNC: 7.6 MG/DL (ref 0.6–1.1)
DEPRECATED RDW RBC AUTO: 16.2 % (ref 12.4–15.4)
EOSINOPHIL # BLD: 0 K/UL (ref 0–0.6)
EOSINOPHIL NFR BLD: 0.5 %
GFR SERPLBLD CREATININE-BSD FMLA CKD-EPI: 6 ML/MIN/{1.73_M2}
GLUCOSE SERPL-MCNC: 144 MG/DL (ref 70–99)
HCG SERPL QL: NEGATIVE
HCT VFR BLD AUTO: 28.8 % (ref 36–48)
HGB BLD-MCNC: 9.5 G/DL (ref 12–16)
LYMPHOCYTES # BLD: 0.7 K/UL (ref 1–5.1)
LYMPHOCYTES NFR BLD: 15.2 %
MCH RBC QN AUTO: 31.3 PG (ref 26–34)
MCHC RBC AUTO-ENTMCNC: 33 G/DL (ref 31–36)
MCV RBC AUTO: 94.8 FL (ref 80–100)
MONOCYTES # BLD: 0.3 K/UL (ref 0–1.3)
MONOCYTES NFR BLD: 6.1 %
NEUTROPHILS # BLD: 3.4 K/UL (ref 1.7–7.7)
NEUTROPHILS NFR BLD: 77.5 %
PLATELET # BLD AUTO: 71 K/UL (ref 135–450)
PLATELET BLD QL SMEAR: ABNORMAL
PMV BLD AUTO: 10.3 FL (ref 5–10.5)
POTASSIUM SERPL-SCNC: 5.5 MMOL/L (ref 3.5–5.1)
POTASSIUM SERPL-SCNC: 6.9 MMOL/L (ref 3.5–5.1)
PROT SERPL-MCNC: 7.6 G/DL (ref 6.4–8.2)
RBC # BLD AUTO: 3.03 M/UL (ref 4–5.2)
REASON FOR REJECTION: NORMAL
REJECTED TEST: NORMAL
SARS-COV-2 RDRP RESP QL NAA+PROBE: NOT DETECTED
SLIDE REVIEW: ABNORMAL
SODIUM SERPL-SCNC: 132 MMOL/L (ref 136–145)
TROPONIN, HIGH SENSITIVITY: 317 NG/L (ref 0–14)
TROPONIN, HIGH SENSITIVITY: 368 NG/L (ref 0–14)
WBC # BLD AUTO: 4.4 K/UL (ref 4–11)

## 2023-04-26 PROCEDURE — 87635 SARS-COV-2 COVID-19 AMP PRB: CPT

## 2023-04-26 PROCEDURE — 82550 ASSAY OF CK (CPK): CPT

## 2023-04-26 PROCEDURE — 70450 CT HEAD/BRAIN W/O DYE: CPT

## 2023-04-26 PROCEDURE — 72131 CT LUMBAR SPINE W/O DYE: CPT

## 2023-04-26 PROCEDURE — 71045 X-RAY EXAM CHEST 1 VIEW: CPT

## 2023-04-26 PROCEDURE — 6370000000 HC RX 637 (ALT 250 FOR IP): Performed by: EMERGENCY MEDICINE

## 2023-04-26 PROCEDURE — 84132 ASSAY OF SERUM POTASSIUM: CPT

## 2023-04-26 PROCEDURE — 96374 THER/PROPH/DIAG INJ IV PUSH: CPT

## 2023-04-26 PROCEDURE — 93005 ELECTROCARDIOGRAM TRACING: CPT | Performed by: EMERGENCY MEDICINE

## 2023-04-26 PROCEDURE — 2500000003 HC RX 250 WO HCPCS: Performed by: EMERGENCY MEDICINE

## 2023-04-26 PROCEDURE — 84703 CHORIONIC GONADOTROPIN ASSAY: CPT

## 2023-04-26 PROCEDURE — 80053 COMPREHEN METABOLIC PANEL: CPT

## 2023-04-26 PROCEDURE — 85025 COMPLETE CBC W/AUTO DIFF WBC: CPT

## 2023-04-26 PROCEDURE — 99285 EMERGENCY DEPT VISIT HI MDM: CPT

## 2023-04-26 PROCEDURE — 1200000000 HC SEMI PRIVATE

## 2023-04-26 PROCEDURE — 96375 TX/PRO/DX INJ NEW DRUG ADDON: CPT

## 2023-04-26 PROCEDURE — 6360000002 HC RX W HCPCS: Performed by: EMERGENCY MEDICINE

## 2023-04-26 PROCEDURE — 36415 COLL VENOUS BLD VENIPUNCTURE: CPT

## 2023-04-26 PROCEDURE — 84484 ASSAY OF TROPONIN QUANT: CPT

## 2023-04-26 RX ORDER — LOSARTAN POTASSIUM 25 MG/1
50 TABLET ORAL NIGHTLY
Status: DISCONTINUED | OUTPATIENT
Start: 2023-04-27 | End: 2023-04-27

## 2023-04-26 RX ORDER — ASPIRIN 81 MG/1
81 TABLET ORAL DAILY
Status: DISCONTINUED | OUTPATIENT
Start: 2023-04-27 | End: 2023-04-28 | Stop reason: HOSPADM

## 2023-04-26 RX ORDER — CLONIDINE HYDROCHLORIDE 0.1 MG/1
0.1 TABLET ORAL EVERY 8 HOURS PRN
Status: DISCONTINUED | OUTPATIENT
Start: 2023-04-26 | End: 2023-04-28 | Stop reason: HOSPADM

## 2023-04-26 RX ORDER — ACETAMINOPHEN 325 MG/1
650 TABLET ORAL EVERY 6 HOURS PRN
Status: DISCONTINUED | OUTPATIENT
Start: 2023-04-26 | End: 2023-04-28 | Stop reason: HOSPADM

## 2023-04-26 RX ORDER — DESVENLAFAXINE 25 MG/1
25 TABLET, EXTENDED RELEASE ORAL DAILY
Status: DISCONTINUED | OUTPATIENT
Start: 2023-04-27 | End: 2023-04-28 | Stop reason: HOSPADM

## 2023-04-26 RX ORDER — ALBUTEROL SULFATE 90 UG/1
2 AEROSOL, METERED RESPIRATORY (INHALATION) EVERY 6 HOURS PRN
Status: DISCONTINUED | OUTPATIENT
Start: 2023-04-26 | End: 2023-04-28 | Stop reason: HOSPADM

## 2023-04-26 RX ORDER — DEXTROSE MONOHYDRATE 25 G/50ML
25 INJECTION, SOLUTION INTRAVENOUS ONCE
Status: COMPLETED | OUTPATIENT
Start: 2023-04-26 | End: 2023-04-26

## 2023-04-26 RX ORDER — FUROSEMIDE 10 MG/ML
80 INJECTION INTRAMUSCULAR; INTRAVENOUS ONCE
Status: COMPLETED | OUTPATIENT
Start: 2023-04-26 | End: 2023-04-26

## 2023-04-26 RX ORDER — SODIUM CHLORIDE 9 MG/ML
INJECTION, SOLUTION INTRAVENOUS PRN
Status: DISCONTINUED | OUTPATIENT
Start: 2023-04-26 | End: 2023-04-28 | Stop reason: HOSPADM

## 2023-04-26 RX ORDER — ALPRAZOLAM 2 MG/1
2 TABLET, EXTENDED RELEASE ORAL DAILY
Status: DISCONTINUED | OUTPATIENT
Start: 2023-04-27 | End: 2023-04-27 | Stop reason: CLARIF

## 2023-04-26 RX ORDER — INSULIN GLARGINE 100 [IU]/ML
10 INJECTION, SOLUTION SUBCUTANEOUS NIGHTLY
Status: DISCONTINUED | OUTPATIENT
Start: 2023-04-26 | End: 2023-04-28 | Stop reason: HOSPADM

## 2023-04-26 RX ORDER — RIZATRIPTAN BENZOATE 10 MG/1
10 TABLET ORAL
Status: DISCONTINUED | OUTPATIENT
Start: 2023-04-26 | End: 2023-04-27 | Stop reason: CLARIF

## 2023-04-26 RX ORDER — HYDRALAZINE HYDROCHLORIDE 20 MG/ML
10 INJECTION INTRAMUSCULAR; INTRAVENOUS EVERY 6 HOURS PRN
Status: DISCONTINUED | OUTPATIENT
Start: 2023-04-26 | End: 2023-04-28 | Stop reason: HOSPADM

## 2023-04-26 RX ORDER — ATORVASTATIN CALCIUM 40 MG/1
40 TABLET, FILM COATED ORAL NIGHTLY
Status: DISCONTINUED | OUTPATIENT
Start: 2023-04-27 | End: 2023-04-28 | Stop reason: HOSPADM

## 2023-04-26 RX ORDER — ONDANSETRON 2 MG/ML
4 INJECTION INTRAMUSCULAR; INTRAVENOUS EVERY 6 HOURS PRN
Status: DISCONTINUED | OUTPATIENT
Start: 2023-04-26 | End: 2023-04-28 | Stop reason: HOSPADM

## 2023-04-26 RX ORDER — 0.9 % SODIUM CHLORIDE 0.9 %
500 INTRAVENOUS SOLUTION INTRAVENOUS PRN
Status: DISCONTINUED | OUTPATIENT
Start: 2023-04-26 | End: 2023-04-28 | Stop reason: HOSPADM

## 2023-04-26 RX ORDER — ONDANSETRON 4 MG/1
4 TABLET, ORALLY DISINTEGRATING ORAL EVERY 8 HOURS PRN
Status: DISCONTINUED | OUTPATIENT
Start: 2023-04-26 | End: 2023-04-28 | Stop reason: HOSPADM

## 2023-04-26 RX ORDER — SODIUM CHLORIDE 0.9 % (FLUSH) 0.9 %
10 SYRINGE (ML) INJECTION PRN
Status: DISCONTINUED | OUTPATIENT
Start: 2023-04-26 | End: 2023-04-28 | Stop reason: HOSPADM

## 2023-04-26 RX ORDER — POTASSIUM CHLORIDE 7.45 MG/ML
10 INJECTION INTRAVENOUS PRN
Status: DISCONTINUED | OUTPATIENT
Start: 2023-04-26 | End: 2023-04-26

## 2023-04-26 RX ORDER — FUROSEMIDE 40 MG/1
80 TABLET ORAL DAILY
Status: DISCONTINUED | OUTPATIENT
Start: 2023-04-27 | End: 2023-04-28 | Stop reason: HOSPADM

## 2023-04-26 RX ORDER — ACETAMINOPHEN 650 MG/1
650 SUPPOSITORY RECTAL EVERY 6 HOURS PRN
Status: DISCONTINUED | OUTPATIENT
Start: 2023-04-26 | End: 2023-04-28 | Stop reason: HOSPADM

## 2023-04-26 RX ORDER — PREGABALIN 75 MG/1
75 CAPSULE ORAL DAILY
Status: DISCONTINUED | OUTPATIENT
Start: 2023-04-27 | End: 2023-04-28 | Stop reason: HOSPADM

## 2023-04-26 RX ORDER — CALCIUM GLUCONATE 94 MG/ML
1000 INJECTION, SOLUTION INTRAVENOUS ONCE
Status: COMPLETED | OUTPATIENT
Start: 2023-04-26 | End: 2023-04-26

## 2023-04-26 RX ORDER — HEPARIN SODIUM 5000 [USP'U]/ML
5000 INJECTION, SOLUTION INTRAVENOUS; SUBCUTANEOUS EVERY 8 HOURS SCHEDULED
Status: DISCONTINUED | OUTPATIENT
Start: 2023-04-26 | End: 2023-04-28 | Stop reason: HOSPADM

## 2023-04-26 RX ORDER — SODIUM CHLORIDE 0.9 % (FLUSH) 0.9 %
10 SYRINGE (ML) INJECTION EVERY 12 HOURS SCHEDULED
Status: DISCONTINUED | OUTPATIENT
Start: 2023-04-26 | End: 2023-04-28 | Stop reason: HOSPADM

## 2023-04-26 RX ORDER — POTASSIUM CHLORIDE 20 MEQ/1
40 TABLET, EXTENDED RELEASE ORAL PRN
Status: DISCONTINUED | OUTPATIENT
Start: 2023-04-26 | End: 2023-04-26

## 2023-04-26 RX ORDER — ACETAMINOPHEN 325 MG/1
650 TABLET ORAL ONCE
Status: COMPLETED | OUTPATIENT
Start: 2023-04-26 | End: 2023-04-26

## 2023-04-26 RX ADMIN — CALCIUM GLUCONATE 1000 MG: 98 INJECTION, SOLUTION INTRAVENOUS at 21:37

## 2023-04-26 RX ADMIN — SODIUM ZIRCONIUM CYCLOSILICATE 10 G: 10 POWDER, FOR SUSPENSION ORAL at 21:34

## 2023-04-26 RX ADMIN — INSULIN HUMAN 10 UNITS: 100 INJECTION, SOLUTION PARENTERAL at 21:37

## 2023-04-26 RX ADMIN — DEXTROSE MONOHYDRATE 25 G: 25 INJECTION, SOLUTION INTRAVENOUS at 21:34

## 2023-04-26 RX ADMIN — FUROSEMIDE 80 MG: 10 INJECTION, SOLUTION INTRAMUSCULAR; INTRAVENOUS at 22:37

## 2023-04-26 RX ADMIN — ACETAMINOPHEN 650 MG: 325 TABLET ORAL at 20:41

## 2023-04-26 ASSESSMENT — PAIN DESCRIPTION - LOCATION: LOCATION: BACK;BUTTOCKS;FOOT

## 2023-04-26 ASSESSMENT — LIFESTYLE VARIABLES
HOW MANY STANDARD DRINKS CONTAINING ALCOHOL DO YOU HAVE ON A TYPICAL DAY: PATIENT DOES NOT DRINK
HOW OFTEN DO YOU HAVE A DRINK CONTAINING ALCOHOL: NEVER

## 2023-04-26 ASSESSMENT — PAIN SCALES - GENERAL
PAINLEVEL_OUTOF10: 7
PAINLEVEL_OUTOF10: 5

## 2023-04-26 ASSESSMENT — PAIN DESCRIPTION - ORIENTATION: ORIENTATION: RIGHT;LEFT

## 2023-04-26 ASSESSMENT — PAIN - FUNCTIONAL ASSESSMENT: PAIN_FUNCTIONAL_ASSESSMENT: 0-10

## 2023-04-27 PROBLEM — E87.5 HYPERKALEMIA: Status: ACTIVE | Noted: 2023-04-27

## 2023-04-27 PROBLEM — Z20.822 SUSPECTED COVID-19 VIRUS INFECTION: Status: ACTIVE | Noted: 2023-04-27

## 2023-04-27 LAB
ALBUMIN SERPL-MCNC: 3.7 G/DL (ref 3.4–5)
ALBUMIN/GLOB SERPL: 1.3 {RATIO} (ref 1.1–2.2)
ALP SERPL-CCNC: 124 U/L (ref 40–129)
ALT SERPL-CCNC: 62 U/L (ref 10–40)
ANION GAP SERPL CALCULATED.3IONS-SCNC: 17 MMOL/L (ref 3–16)
AST SERPL-CCNC: 74 U/L (ref 15–37)
BASOPHILS # BLD: 0 K/UL (ref 0–0.2)
BASOPHILS NFR BLD: 0.8 %
BILIRUB SERPL-MCNC: 0.6 MG/DL (ref 0–1)
BUN SERPL-MCNC: 60 MG/DL (ref 7–20)
CALCIUM SERPL-MCNC: 9.9 MG/DL (ref 8.3–10.6)
CHLORIDE SERPL-SCNC: 94 MMOL/L (ref 99–110)
CO2 SERPL-SCNC: 21 MMOL/L (ref 21–32)
CREAT SERPL-MCNC: 8.7 MG/DL (ref 0.6–1.1)
DEPRECATED RDW RBC AUTO: 16.4 % (ref 12.4–15.4)
EKG ATRIAL RATE: 107 BPM
EKG DIAGNOSIS: NORMAL
EKG P AXIS: 33 DEGREES
EKG P-R INTERVAL: 152 MS
EKG Q-T INTERVAL: 336 MS
EKG QRS DURATION: 84 MS
EKG QTC CALCULATION (BAZETT): 448 MS
EKG R AXIS: 26 DEGREES
EKG T AXIS: 5 DEGREES
EKG VENTRICULAR RATE: 107 BPM
EOSINOPHIL # BLD: 0.3 K/UL (ref 0–0.6)
EOSINOPHIL NFR BLD: 4.6 %
GFR SERPLBLD CREATININE-BSD FMLA CKD-EPI: 5 ML/MIN/{1.73_M2}
GLUCOSE BLD-MCNC: 109 MG/DL (ref 70–99)
GLUCOSE BLD-MCNC: 115 MG/DL (ref 70–99)
GLUCOSE BLD-MCNC: 171 MG/DL (ref 70–99)
GLUCOSE BLD-MCNC: 64 MG/DL (ref 70–99)
GLUCOSE SERPL-MCNC: 123 MG/DL (ref 70–99)
HCT VFR BLD AUTO: 32.1 % (ref 36–48)
HGB BLD-MCNC: 10.6 G/DL (ref 12–16)
LIPASE SERPL-CCNC: 16 U/L (ref 13–60)
LYMPHOCYTES # BLD: 1.3 K/UL (ref 1–5.1)
LYMPHOCYTES NFR BLD: 21.6 %
MCH RBC QN AUTO: 31.3 PG (ref 26–34)
MCHC RBC AUTO-ENTMCNC: 33 G/DL (ref 31–36)
MCV RBC AUTO: 94.8 FL (ref 80–100)
MONOCYTES # BLD: 0.6 K/UL (ref 0–1.3)
MONOCYTES NFR BLD: 10.2 %
NEUTROPHILS # BLD: 3.7 K/UL (ref 1.7–7.7)
NEUTROPHILS NFR BLD: 62.8 %
PERFORMED ON: ABNORMAL
PLATELET # BLD AUTO: 144 K/UL (ref 135–450)
PMV BLD AUTO: 8.9 FL (ref 5–10.5)
POTASSIUM SERPL-SCNC: 5.5 MMOL/L (ref 3.5–5.1)
PROT SERPL-MCNC: 6.6 G/DL (ref 6.4–8.2)
RBC # BLD AUTO: 3.39 M/UL (ref 4–5.2)
SARS-COV-2 RDRP RESP QL NAA+PROBE: NOT DETECTED
SODIUM SERPL-SCNC: 132 MMOL/L (ref 136–145)
WBC # BLD AUTO: 6 K/UL (ref 4–11)

## 2023-04-27 PROCEDURE — 97530 THERAPEUTIC ACTIVITIES: CPT

## 2023-04-27 PROCEDURE — 93010 ELECTROCARDIOGRAM REPORT: CPT | Performed by: INTERNAL MEDICINE

## 2023-04-27 PROCEDURE — 6370000000 HC RX 637 (ALT 250 FOR IP): Performed by: INTERNAL MEDICINE

## 2023-04-27 PROCEDURE — 36415 COLL VENOUS BLD VENIPUNCTURE: CPT

## 2023-04-27 PROCEDURE — 97162 PT EVAL MOD COMPLEX 30 MIN: CPT

## 2023-04-27 PROCEDURE — 2580000003 HC RX 258: Performed by: INTERNAL MEDICINE

## 2023-04-27 PROCEDURE — 1200000000 HC SEMI PRIVATE

## 2023-04-27 PROCEDURE — 85025 COMPLETE CBC W/AUTO DIFF WBC: CPT

## 2023-04-27 PROCEDURE — 97535 SELF CARE MNGMENT TRAINING: CPT

## 2023-04-27 PROCEDURE — 5A1D70Z PERFORMANCE OF URINARY FILTRATION, INTERMITTENT, LESS THAN 6 HOURS PER DAY: ICD-10-PCS | Performed by: INTERNAL MEDICINE

## 2023-04-27 PROCEDURE — 97166 OT EVAL MOD COMPLEX 45 MIN: CPT

## 2023-04-27 PROCEDURE — 97116 GAIT TRAINING THERAPY: CPT

## 2023-04-27 PROCEDURE — 90935 HEMODIALYSIS ONE EVALUATION: CPT

## 2023-04-27 PROCEDURE — 87635 SARS-COV-2 COVID-19 AMP PRB: CPT

## 2023-04-27 PROCEDURE — 80053 COMPREHEN METABOLIC PANEL: CPT

## 2023-04-27 PROCEDURE — 6360000002 HC RX W HCPCS: Performed by: INTERNAL MEDICINE

## 2023-04-27 PROCEDURE — 83690 ASSAY OF LIPASE: CPT

## 2023-04-27 RX ORDER — DEXTROSE MONOHYDRATE 100 MG/ML
INJECTION, SOLUTION INTRAVENOUS CONTINUOUS PRN
Status: DISCONTINUED | OUTPATIENT
Start: 2023-04-27 | End: 2023-04-28 | Stop reason: HOSPADM

## 2023-04-27 RX ORDER — ALPRAZOLAM 0.5 MG/1
0.5 TABLET ORAL EVERY 6 HOURS SCHEDULED
Status: DISCONTINUED | OUTPATIENT
Start: 2023-04-27 | End: 2023-04-28 | Stop reason: HOSPADM

## 2023-04-27 RX ORDER — SUMATRIPTAN 25 MG/1
100 TABLET, FILM COATED ORAL
Status: ACTIVE | OUTPATIENT
Start: 2023-04-27 | End: 2023-04-28

## 2023-04-27 RX ADMIN — ATORVASTATIN CALCIUM 40 MG: 40 TABLET, FILM COATED ORAL at 00:53

## 2023-04-27 RX ADMIN — ALPRAZOLAM 0.5 MG: 0.5 TABLET ORAL at 00:53

## 2023-04-27 RX ADMIN — FUROSEMIDE 80 MG: 40 TABLET ORAL at 09:24

## 2023-04-27 RX ADMIN — SODIUM CHLORIDE, PRESERVATIVE FREE 10 ML: 5 INJECTION INTRAVENOUS at 09:24

## 2023-04-27 RX ADMIN — ALPRAZOLAM 0.5 MG: 0.5 TABLET ORAL at 19:27

## 2023-04-27 RX ADMIN — SODIUM CHLORIDE, PRESERVATIVE FREE 10 ML: 5 INJECTION INTRAVENOUS at 20:55

## 2023-04-27 RX ADMIN — HEPARIN SODIUM 5000 UNITS: 5000 INJECTION INTRAVENOUS; SUBCUTANEOUS at 14:13

## 2023-04-27 RX ADMIN — ATORVASTATIN CALCIUM 40 MG: 40 TABLET, FILM COATED ORAL at 20:54

## 2023-04-27 RX ADMIN — ALPRAZOLAM 0.5 MG: 0.5 TABLET ORAL at 06:28

## 2023-04-27 RX ADMIN — PREGABALIN 75 MG: 75 CAPSULE ORAL at 09:24

## 2023-04-27 RX ADMIN — LOSARTAN POTASSIUM 50 MG: 25 TABLET, FILM COATED ORAL at 00:53

## 2023-04-27 RX ADMIN — HEPARIN SODIUM 5000 UNITS: 5000 INJECTION INTRAVENOUS; SUBCUTANEOUS at 23:06

## 2023-04-27 RX ADMIN — ASPIRIN 81 MG: 81 TABLET, COATED ORAL at 09:23

## 2023-04-27 RX ADMIN — HEPARIN SODIUM 5000 UNITS: 5000 INJECTION INTRAVENOUS; SUBCUTANEOUS at 00:53

## 2023-04-27 RX ADMIN — TIOTROPIUM BROMIDE AND OLODATEROL 2 PUFF: 3.124; 2.736 SPRAY, METERED RESPIRATORY (INHALATION) at 07:50

## 2023-04-27 ASSESSMENT — PAIN DESCRIPTION - LOCATION: LOCATION: BACK

## 2023-04-27 ASSESSMENT — PAIN DESCRIPTION - ORIENTATION: ORIENTATION: MID;LOWER

## 2023-04-27 ASSESSMENT — PAIN DESCRIPTION - DESCRIPTORS: DESCRIPTORS: PATIENT UNABLE TO DESCRIBE

## 2023-04-27 ASSESSMENT — PAIN SCALES - GENERAL: PAINLEVEL_OUTOF10: 5

## 2023-04-27 NOTE — CARE COORDINATION
Case Management Assessment  Initial Evaluation    Date/Time of Evaluation: 4/27/2023 7:18 PM  Assessment Completed by: LEYDI Zayas, BENNY    If patient is discharged prior to next notation, then this note serves as note for discharge by case management. Patient Name: Rosmery Ybarra                   YOB: 1975  Diagnosis: Hyperkalemia [E87.5]  General weakness [R53.1]  ESRD (end stage renal disease) ([facility] [address] [N18.6]  ESRD on hemodialysis ([facility] [N18.6, Z99.2]  Acute midline low back pain without sciatica [M54.50]                   Date / Time: 4/26/2023  6:48 PM    Patient Admission Status: Inpatient   Readmission Risk (Low < 19, Mod (19-27), High > 27): Readmission Risk Score: 17.6    Current PCP: Christina Carmen MD  PCP verified by CM? Yes    Chart Reviewed: Yes      History Provided by: Patient  Patient Orientation: Alert and Oriented    Patient Cognition: Alert    Hospitalization in the last 30 days (Readmission):  No    If yes, Readmission Assessment in CM Navigator will be completed. Advance Directives:      Code Status: Full Code   Patient's Primary Decision Maker is:      Primary Decision Maker: Lee Arthur - Spouse - [phone]    Discharge Planning:    Patient lives with: Spouse/Significant Other Type of Home: Other (Comment) (Condo - 1 level - 1 step in)  Primary Care Giver: Spouse (Patient is blind, has history of 2 strokes and on dialysis. Spouse cares for patient at home almost 24/7)  Patient Support Systems include: Spouse/Significant Other   Current Financial resources: Medicare, Medicaid  Current community resources: None  Current services prior to admission: Durable Medical Equipment            Current DME: Other (Comment) (shower chair, grab bars, walker, gait belt)            Type of Home Care services:  None    ADLS  Prior functional level: Assistance with the following:, Mobility (Pt crawls on the floor at home mostly.   Spouse helps with walker and gait belt in the

## 2023-04-27 NOTE — FLOWSHEET NOTE
04/27/23 0201   Assessment   Charting Type Admission   Psychosocial   Psychosocial (WDL) X   Patient Behaviors Other (comment)  (delayed)   Neurological   Neuro (WDL) X   Level of Consciousness 0   Orientation Level Oriented X4   Cognition Appropriate for developmental age; Appropriate attention/concentration; Appropriate safety awareness; Appropriate judgement   Speech Delayed responses   [place] Coma Scale   Eye Opening 4   Best Verbal Response 5   Best Motor Response 6   [place] Coma Scale Score 15   HEENT (Head, Ears, Eyes, Nose, & Throat)   HEENT (WDL) X   Right Eye Blind   Left Eye Blind   Respiratory   Respiratory (WDL) WDL   Cardiac   Cardiac (WDL) WDL   Gastrointestinal   Abdominal (WDL) WDL   Abdomen Inspection Rounded   RUQ Bowel Sounds Active   LUQ Bowel Sounds Active   RLQ Bowel Sounds Active   LLQ Bowel Sounds Active   Genitourinary   Genitourinary (WDL) X  (purewick)   Peripheral Vascular   Peripheral Vascular (WDL) WDL   Edema None   Dual Clinician Skin Assessment   Dual Skin Assessment (4 Eyes) WDL   Skin Integumentary    Skin Integumentary (WDL) WDL   Musculoskeletal   Musculoskeletal (WDL) X   RL Extremity Weakness   LL Extremity Weakness

## 2023-04-27 NOTE — ED PROVIDER NOTES
white matter which is unchanged. XR CHEST PORTABLE   Final Result   Pulmonary vascular congestion. Cardiomegaly. Any applicable radiology studies including x-ray, CT, MRI, and/or ultrasound, were reviewed independently by me in addition to the radiologist.  I reviewed all radiology images and reports as well from this evaluation.     Labs  Results for orders placed or performed during the hospital encounter of 04/26/23   COVID-19, Rapid    Specimen: Nasopharyngeal Swab   Result Value Ref Range    SARS-CoV-2, NAAT Not Detected Not Detected   CBC with Auto Differential   Result Value Ref Range    WBC 4.4 4.0 - 11.0 K/uL    RBC 3.03 (L) 4.00 - 5.20 M/uL    Hemoglobin 9.5 (L) 12.0 - 16.0 g/dL    Hematocrit 28.8 (L) 36.0 - 48.0 %    MCV 94.8 80.0 - 100.0 fL    MCH 31.3 26.0 - 34.0 pg    MCHC 33.0 31.0 - 36.0 g/dL    RDW 16.2 (H) 12.4 - 15.4 %    Platelets 71 (L) 587 - 450 K/uL    MPV 10.3 5.0 - 10.5 fL    PLATELET SLIDE REVIEW Decreased     SLIDE REVIEW see below     Neutrophils % 77.5 %    Lymphocytes % 15.2 %    Monocytes % 6.1 %    Eosinophils % 0.5 %    Basophils % 0.7 %    Neutrophils Absolute 3.4 1.7 - 7.7 K/uL    Lymphocytes Absolute 0.7 (L) 1.0 - 5.1 K/uL    Monocytes Absolute 0.3 0.0 - 1.3 K/uL    Eosinophils Absolute 0.0 0.0 - 0.6 K/uL    Basophils Absolute 0.0 0.0 - 0.2 K/uL   SPECIMEN REJECTION   Result Value Ref Range    Rejected Test cmp trp     Reason for Rejection see below    Comprehensive Metabolic Panel w/ Reflex to MG   Result Value Ref Range    Sodium 132 (L) 136 - 145 mmol/L    Potassium reflex Magnesium 6.9 (HH) 3.5 - 5.1 mmol/L    Chloride 93 (L) 99 - 110 mmol/L    CO2 18 (L) 21 - 32 mmol/L    Anion Gap 21 (H) 3 - 16    Glucose 144 (H) 70 - 99 mg/dL    BUN 57 (H) 7 - 20 mg/dL    Creatinine 7.6 (HH) 0.6 - 1.1 mg/dL    Est, Glom Filt Rate 6 (A) >60    Calcium 10.2 8.3 - 10.6 mg/dL    Total Protein 7.6 6.4 - 8.2 g/dL    Albumin 4.2 3.4 - 5.0 g/dL    Albumin/Globulin Ratio 1.2 1.1

## 2023-04-27 NOTE — H&P
History and Physical  Dr. Ankita Ortega  4/27/2023    PCP: Becca Herrera MD    Cc:   Chief Complaint   Patient presents with    Illness     Pt arrives from home via [facility]. Pt has been sick with n/v since Sunday. D/t this, pt has missed M and W diaylsis, hasn't had it since Friday. Fall     Pt fell yesterday and today, this is not normal for her, and has been noticing an increase in weakness. HPI:  Marlene Chapa is a 52 y.o. female who has a past medical history of Acute respiratory failure due to COVID-19 [facility]), Arterial ischemic stroke, ICA, left, acute ([address] Blind in both eyes, Cerebral artery occlusion with cerebral infarction ([address] CHF (congestive heart failure) ([address] Chronic kidney disease, COPD (chronic obstructive pulmonary disease) ([address] Depression, Diabetes mellitus out of control, Diabetes mellitus, type II ([address] Diabetic neuropathy associated with type 2 diabetes mellitus ([address] Generalized headaches, Hypertension, Infertility, Insomnia, Migraine headache, Mixed hyperlipidemia, Otitis media, Pelvic abscess in female, Pneumonia, Stroke ([address] and Stroke ([address] Patient presents with Volume overload. HPI  (1-3 for expanded problem focused, ?4 for detailed/comprehensive)     52 y.o. female with pertinent past medical history of ESRD on hemodialysis, hypertension, diabetes, prior CVA who was brought in by EMS transportation for generalized weakness. Patient states that she has been ill with fatigue, muscle aches and weakness ongoing for about the past 5 days. Associated with nausea and vomiting. Patient also reports she has been falling, most recently about 1 week ago and again today, states she is falling because her legs feel weak. She is uncertain if she is struck her head, however believes she may have done so. As she has missed at least 2 dialysis sessions, she has metabolic abnormalities.  Creat and K are elevated    Will need admission for emergent

## 2023-04-27 NOTE — RT PROTOCOL NOTE
Bronchodilator order with Frequency of every 4 hours PRN for wheezing or increased work of breathing using Per Protocol order mode. 4-6 - enter or revise RT Bronchodilator order(s) to two equivalent RT bronchodilator orders with one order with BID Frequency and one order with Frequency of every 4 hours PRN wheezing or increased work of breathing using Per Protocol order mode. 7-10 - enter or revise RT Bronchodilator order(s) to two equivalent RT bronchodilator orders with one order with TID Frequency and one order with Frequency of every 4 hours PRN wheezing or increased work of breathing using Per Protocol order mode. 11-13 - enter or revise RT Bronchodilator order(s) to one equivalent RT bronchodilator order with QID Frequency and an Albuterol order with Frequency of every 4 hours PRN wheezing or increased work of breathing using Per Protocol order mode. Greater than 13 - enter or revise RT Bronchodilator order(s) to one equivalent RT bronchodilator order with every 4 hours Frequency and an Albuterol order with Frequency of every 2 hours PRN wheezing or increased work of breathing using Per Protocol order mode. RT to enter RT Home Evaluation for COPD & MDI Assessment order using Per Protocol order mode.     Electronically signed by Maikol Marti RCP on 4/27/2023 at 3:13 AM

## 2023-04-27 NOTE — CONSULTS
MD Ghazal Moore MD Gilles Mina, MD                Office: [phone]                      Fax: [phone]          NEPHROLOGY INITIAL CONSULT NOTE:     PATIENT NAME: Oskar Cota  : 1975  MRN: [id number]  REASON FOR CONSULT: I am asked to see this patient in consultation for my opinion regarding management of ESRD. My recommendations will be communicated by way of shared medical record. IMPRESSION / RECOMMENDATION:      Admitted on:  2023  For:  Hyperkalemia [E87.5]  General weakness [R53.1]  ESRD (end stage renal disease) ([facility] [address] [N18.6]  ESRD on hemodialysis ([facility] [address] [N18.6, Z99.2]  Acute midline low back pain without sciatica [M54.50]      1. ESRD on iHD: MWF.   - outpt HD center: [facility], Dr. Tess Kennedy  - Access: [facility]   -Patient missed last 2 sessions of dialysis, reportedly saying not feeling well with GI symptoms,    -Add on HD today and Friday   -Low K bath , follow-up potassium after dialysis    2. Hypertension/Volume Status:  - BP hypertension, slightly uncontrolled, due to missing dialysis and fluid overload, follow-up to dialysis with more fluid overload:   - Na chronic, likely due to ESRD, fluid overload, follow with dialysis  - EDW for outpatient  kg :     - fluid removal to DW        3. Electrolytes/acid-base:  K: Hyperkalemia, due to missing dialysis  Stop her ARB,   Follow-up to dialysis    High AG metabolic acidosis: Uncontrolled, follow-up to dialysis    4. Anemia of renal failure: slightly below goal  - Iron: - RIA: - not needed now     5. BMD:  - Phos, calcium - follow in morning labs-   - follow iPTH outpt      : Other supportive care :   - Check daily renal function panel with electrolytes-phosphorus  - Strict monitoring of I/Os, daily weight  - Renal feeds/diet  - Current medications reviewed. - Nephrotoxic medications have been discontinued. - Dose adjusted and appropriate.      - Dose

## 2023-04-27 NOTE — FLOWSHEET NOTE
Treatment time: 2.5 HRS     Net UF: 2.6 LITERS     Pre weight: 85.6KG  Post weight: 83KG  EDW: TBD     Access used: R TDC  Access function: GOOD. Medications or blood products given: NONE     Regular outpatient schedule: TBD     Summary of response to treatment: PT TOLERATED TX WELL. NO MEDS GIVEN.   NEXT TX TMRO FOR 2.5 HRS     Copy of dialysis treatment record placed in chart, to be scanned into EMR.      04/27/23 0950 04/27/23 1227   Vital Signs   BP (!) 161/92 (!) 159/85   Temp 97.6 °F (36.4 °C) 97.7 °F (36.5 °C)   Heart Rate 96 98   Weight 188 lb 11.4 oz (85.6 kg) 182 lb 15.7 oz (83 kg)   Weight Method Standing scale  --    Post-Hemodialysis Assessment   Hemodialysis Intake (ml)  --  400 ml   Hemodialysis Output (ml)  --  3000 ml   NET Removed (ml)  --  2600   Tolerated Treatment  --  Good

## 2023-04-28 VITALS
HEART RATE: 103 BPM | OXYGEN SATURATION: 94 % | BODY MASS INDEX: 28.24 KG/M2 | DIASTOLIC BLOOD PRESSURE: 72 MMHG | WEIGHT: 179.9 LBS | SYSTOLIC BLOOD PRESSURE: 147 MMHG | RESPIRATION RATE: 18 BRPM | HEIGHT: 67 IN | TEMPERATURE: 98 F

## 2023-04-28 LAB
ALBUMIN SERPL-MCNC: 3.6 G/DL (ref 3.4–5)
ALP SERPL-CCNC: 126 U/L (ref 40–129)
ALT SERPL-CCNC: 67 U/L (ref 10–40)
ANION GAP SERPL CALCULATED.3IONS-SCNC: 13 MMOL/L (ref 3–16)
AST SERPL-CCNC: 56 U/L (ref 15–37)
BASOPHILS # BLD: 0.1 K/UL (ref 0–0.2)
BASOPHILS NFR BLD: 1 %
BILIRUB DIRECT SERPL-MCNC: 0.3 MG/DL (ref 0–0.3)
BILIRUB INDIRECT SERPL-MCNC: 0.4 MG/DL (ref 0–1)
BILIRUB SERPL-MCNC: 0.7 MG/DL (ref 0–1)
BUN SERPL-MCNC: 35 MG/DL (ref 7–20)
CALCIUM SERPL-MCNC: 9.1 MG/DL (ref 8.3–10.6)
CHLORIDE SERPL-SCNC: 97 MMOL/L (ref 99–110)
CO2 SERPL-SCNC: 26 MMOL/L (ref 21–32)
CREAT SERPL-MCNC: 6.3 MG/DL (ref 0.6–1.1)
CRP SERPL-MCNC: 29.4 MG/L (ref 0–5.1)
DEPRECATED RDW RBC AUTO: 16.1 % (ref 12.4–15.4)
EOSINOPHIL # BLD: 0.2 K/UL (ref 0–0.6)
EOSINOPHIL NFR BLD: 3.1 %
GFR SERPLBLD CREATININE-BSD FMLA CKD-EPI: 8 ML/MIN/{1.73_M2}
GLUCOSE BLD-MCNC: 157 MG/DL (ref 70–99)
GLUCOSE BLD-MCNC: 167 MG/DL (ref 70–99)
GLUCOSE SERPL-MCNC: 134 MG/DL (ref 70–99)
HCT VFR BLD AUTO: 30.4 % (ref 36–48)
HGB BLD-MCNC: 10 G/DL (ref 12–16)
LYMPHOCYTES # BLD: 1.3 K/UL (ref 1–5.1)
LYMPHOCYTES NFR BLD: 23.6 %
MCH RBC QN AUTO: 30.9 PG (ref 26–34)
MCHC RBC AUTO-ENTMCNC: 33 G/DL (ref 31–36)
MCV RBC AUTO: 93.7 FL (ref 80–100)
MONOCYTES # BLD: 0.5 K/UL (ref 0–1.3)
MONOCYTES NFR BLD: 10 %
NEUTROPHILS # BLD: 3.3 K/UL (ref 1.7–7.7)
NEUTROPHILS NFR BLD: 62.3 %
PERFORMED ON: ABNORMAL
PERFORMED ON: ABNORMAL
PLATELET # BLD AUTO: 144 K/UL (ref 135–450)
PMV BLD AUTO: 8.6 FL (ref 5–10.5)
POTASSIUM SERPL-SCNC: 4.6 MMOL/L (ref 3.5–5.1)
PROT SERPL-MCNC: 6.3 G/DL (ref 6.4–8.2)
RBC # BLD AUTO: 3.24 M/UL (ref 4–5.2)
SODIUM SERPL-SCNC: 136 MMOL/L (ref 136–145)
WBC # BLD AUTO: 5.3 K/UL (ref 4–11)

## 2023-04-28 PROCEDURE — 2580000003 HC RX 258: Performed by: INTERNAL MEDICINE

## 2023-04-28 PROCEDURE — 36415 COLL VENOUS BLD VENIPUNCTURE: CPT

## 2023-04-28 PROCEDURE — 80048 BASIC METABOLIC PNL TOTAL CA: CPT

## 2023-04-28 PROCEDURE — 85025 COMPLETE CBC W/AUTO DIFF WBC: CPT

## 2023-04-28 PROCEDURE — 80076 HEPATIC FUNCTION PANEL: CPT

## 2023-04-28 PROCEDURE — 6370000000 HC RX 637 (ALT 250 FOR IP): Performed by: INTERNAL MEDICINE

## 2023-04-28 PROCEDURE — 6360000002 HC RX W HCPCS: Performed by: INTERNAL MEDICINE

## 2023-04-28 PROCEDURE — 90935 HEMODIALYSIS ONE EVALUATION: CPT

## 2023-04-28 PROCEDURE — 86140 C-REACTIVE PROTEIN: CPT

## 2023-04-28 RX ADMIN — ALPRAZOLAM 0.5 MG: 0.5 TABLET ORAL at 07:40

## 2023-04-28 RX ADMIN — SODIUM CHLORIDE, PRESERVATIVE FREE 10 ML: 5 INJECTION INTRAVENOUS at 08:38

## 2023-04-28 RX ADMIN — HYDRALAZINE HYDROCHLORIDE 10 MG: 20 INJECTION INTRAMUSCULAR; INTRAVENOUS at 15:42

## 2023-04-28 RX ADMIN — ALPRAZOLAM 0.5 MG: 0.5 TABLET ORAL at 15:28

## 2023-04-28 RX ADMIN — HEPARIN SODIUM 5000 UNITS: 5000 INJECTION INTRAVENOUS; SUBCUTANEOUS at 07:40

## 2023-04-28 RX ADMIN — ASPIRIN 81 MG: 81 TABLET, COATED ORAL at 08:39

## 2023-04-28 RX ADMIN — HEPARIN SODIUM 5000 UNITS: 5000 INJECTION INTRAVENOUS; SUBCUTANEOUS at 15:28

## 2023-04-28 RX ADMIN — ALPRAZOLAM 0.5 MG: 0.5 TABLET ORAL at 00:02

## 2023-04-28 RX ADMIN — PREGABALIN 75 MG: 75 CAPSULE ORAL at 08:39

## 2023-04-28 RX ADMIN — FUROSEMIDE 80 MG: 40 TABLET ORAL at 08:39

## 2023-04-28 NOTE — PLAN OF CARE
Problem: Discharge Planning  Goal: Discharge to home or other facility with appropriate resources  4/28/2023 1601 by Ivania Lombardo RN  Outcome: Completed     Problem: Pain  Goal: Verbalizes/displays adequate comfort level or baseline comfort level  4/28/2023 1601 by Ivania Lombardo RN  Outcome: Completed     Problem: Safety - Adult  Goal: Free from fall injury  4/28/2023 1601 by Ivania Lombardo RN  Outcome: Completed

## 2023-04-28 NOTE — CARE COORDINATION
Case Management -  Discharge Note      Patient Name: Anastacio Lockhart                   YOB: 1975            Readmission Risk (Low < 19, Mod (19-27), High > 27): Readmission Risk Score: 17.5    Current PCP: Lori Pizarro MD    ([facility]) Important Message from Medicare:    Date: 4/26/2023    PT AM-PAC: 25 /24  OT AM-PAC: 18 /24    Patient/patient representative has been educated on the benefits of outpatient PT/OT as well as the possible risks of declining recommended services. Patient/patient representative has acknowledged the information provided and decided on the following discharge plan. [place] [id number]  [phone]  [place], [address] [facility]  Phone: [phone]  Fax: [phone]       Financial    Payor: Angy Watson / Plan: MEDICARE PART A AND B / Product Type: *No Product type* /     Pharmacy:  Potential assistance Purchasing Medications: No  Meds-to-Beds request: [facility] [id number] Nicanor Bashir  - P [phone] Shama Gama [phone]  [address] [place] [address] Road  [address] [place] [address]  Phone: [phone] Fax: [phone]    [id number] - [id number]  [address] [address]  Phone: [phone] Fax: [phone]      Notes:     Additional Case Management Notes: Therapy orders, notes and H&P faxed to [facility] Out[facility]R    Electronically signed by Riley Lambert RN on 4/28/2023 at 3:14 PM

## 2023-04-28 NOTE — DISCHARGE SUMMARY
mild spinal stenosis. There are no pars defects. The posterior elements are intact SOFT TISSUES/RETROPERITONEUM: No paraspinal mass is seen. Large fused osteophytes anterior to the L1-2 disc space with a linear lucency seen through the osteophyte which is more apparent and could represent a nondisplaced fracture through the osteophyte. Recommend clinical follow-up and suggest MRI correlation if indicated. Moderate degenerative disc changes throughout with mild old compression deformities of T12, L1, and L2 with no displaced or retropulsed fragments. Small central disc bulges at L3-4 and L4-5. Mild osteoarthritic changes of the facets throughout causing diffuse minimal spinal stenosis. XR CHEST PORTABLE    Result Date: 4/26/2023  EXAMINATION: ONE XRAY VIEW OF THE CHEST 4/26/2023 7:25 pm COMPARISON: Chest x-ray dated 09/02/2022. HISTORY: ORDERING SYSTEM PROVIDED HISTORY: weakness TECHNOLOGIST PROVIDED HISTORY: Reason for exam:->weakness Reason for Exam: weakness FINDINGS: LINES/TUBES/OTHER: Tunneled right IJ hemodialysis catheter is again noted. HEART/MEDIASTINUM: The cardiac silhouette is enlarged. PLEURA/LUNGS: There is pulmonary vascular congestion. There are no focal consolidations or pleural effusions. There is no appreciable pneumothorax. BONES/SOFT TISSUE: No acute abnormality. Pulmonary vascular congestion. Cardiomegaly.          Discharge Exam:  See progress note from day of d/c    Disposition: home    Condition: stable    Discharge Medications:     Medication List        CONTINUE taking these medications      albuterol sulfate  (90 Base) MCG/ACT inhaler  Commonly known as: PROVENTIL;VENTOLIN;PROAIR  Inhale 2 puffs into the lungs every 6 hours as needed for Wheezing or Shortness of Breath     ALPRAZolam 2 MG extended release tablet  Commonly known as: ALPRAZolam XR  TAKE ONE TABLET BY MOUTH EVERY MORNING FOR 30 DAYS     aspirin 81 MG EC tablet  Take 1 tablet by mouth daily

## 2023-04-28 NOTE — PROGRESS NOTES
04/27/23 0312   RT Protocol   History Pulmonary Disease 1   Respiratory pattern 0   Breath sounds 2   Cough 0   Indications for Bronchodilator Therapy On home bronchodilators   Bronchodilator Assessment Score 3
4 Eyes Skin Assessment     NAME:  Kamryn Osborne  YOB: 1975  MEDICAL RECORD NUMBER:  [id number]    The patient is being assessed for  Admission    I agree that at least one RN has performed a thorough Head to Toe Skin Assessment on the patient. ALL assessment sites listed below have been assessed. Areas assessed by both nurses:    Head, Face, Ears, Shoulders, Back, Chest, Arms, Elbows, Hands, Sacrum. Buttock, Coccyx, Ischium, Legs. Feet and Heels, and Under Medical Devices         Does the Patient have a Wound?  No noted wound(s)       Robbie Prevention initiated by RN: No  Wound Care Orders initiated by RN: No    Pressure Injury (Stage 3,4, Unstageable, DTI, NWPT, and Complex wounds) if present, place Wound referral order by RN under : No    New Ostomies, if present place, Ostomy referral order under : No     Nurse 1 eSignature: Electronically signed by Deejay Mcconnell RN on 4/27/23 at 12:45 AM EDT    **SHARE this note so that the co-signing nurse can place an eSignature**    Nurse 2 eSignature: Electronically signed by Марина Zamarripa RN on 4/27/23 at 3:05 AM EDT
BP elevated. PRN hydralazine given per order.
D critical lab reported to md
Discharge instructions given to patient. IV removed without complication. All belongings sent with patient. Patient denies further questions.
Elma Schrader was evaluated today and a DME order was entered for a standard wheelchair because she requires this to successfully complete daily living tasks of ambulating. A standard manual wheelchair is necessary due to patient's impaired ambulation and mobility restrictions and would be unable to resolve these daily living tasks using a cane or walker. The patient is capable of using a standard wheelchair safely in their home and can maneuver within their home with adequate access. There is a caregiver available to provide necessary assistance. The need for this equipment was discussed with the patient and she understands, is in agreement, and has not expressed an unwillingness to use the wheelchair.
MD Edgardo Ross MD Patterson Shearer, MD                Office: [phone]                      Fax: [phone]          Inpatient Dialysis Progress Note:     PATIENT NAME: Christoph Sharma  : 1975  MRN: [id number]    Indication for Dialysis: ESRD   Patient seen on dialysis treatment. Tolerating treatment  Fairly well    Vitals:    23 1227   BP: (!) 159/85   Pulse: 98   Resp:    Temp: 97.7 °F (36.5 °C)   SpO2:        Awake, co-operative   Neck: Supple. no JVD. Cardiac: S1 and S2+, No pericardial rub. Chest: Normal respiratory effort,  Rales. No rhonchi  Ext :  LE Edema , no cynosis    Vascular Access:   Hemodialysis catheter location: RIGHT tunneled CW . Exit site demonstrates: normal findings; no signs of bleeding, redness, tenderness or discharge       Labs Reviewed  by me   Labs   Lab Results   Component Value Date    CREATININE 8.7 (HH) 2023    BUN 60 (H) 2023     (L) 2023    K 5.5 (H) 2023    CL 94 (L) 2023    CO2 21 2023     Lab Results   Component Value Date    WBC 6.0 2023    HGB 10.6 (L) 2023    HCT 32.1 (L) 2023    MCV 94.8 2023     2023       Dialysis Treatment and Prescription reviewed    RX:  See dialysis flowsheet for specifics on access, blood flow rate, dialysate baths, duration of dialysis, anticoagulation and other technical information. COMMENTS:  Stable on dialysis. Continue to Target dry weight and clearance. Monitor closely for any hypotension    : Tolerating dialysis well, with no complications. Hypotension on dialysis-stable to improved. Good flow access. :Anemia. Stable. Continue Aransep. :Fluid Overload. Fluid removal as tolerated with dialysis. :Continue out patient Dialysis treatments. Next dialysis treatment repeat tomorrow. Not ready for discharge. Please refer to the orders.    Dialysis treatment plan and
MD Jose De Jesus Torres MD Marguerita Barre, MD                Office: [phone]                      Fax: [phone]          Inpatient Dialysis Progress Note:     PATIENT NAME: Kamryn Osborne  : 1975  MRN: [id number]    Indication for Dialysis: ESRD   Patient seen on dialysis treatment. Tolerating treatment  Fairly well    Vitals:    23 0838   BP: (!) 171/89   Pulse: (!) 103   Resp: 18   Temp: 98 °F (36.7 °C)   SpO2: 94%       Awake, co-operative   Neck: Supple. no JVD. Cardiac: S1 and S2+, No pericardial rub. Chest: Normal respiratory effort,  Rales. No rhonchi  Ext :  LE Edema , no cynosis    Vascular Access:   Hemodialysis catheter location: RIGHT tunneled CW . Exit site demonstrates: normal findings; no signs of bleeding, redness, tenderness or discharge       Labs Reviewed  by me   Labs   Lab Results   Component Value Date    CREATININE 6.3 (HH) 2023    BUN 35 (H) 2023     2023    K 4.6 2023    CL 97 (L) 2023    CO2 26 2023     Lab Results   Component Value Date    WBC 5.3 2023    HGB 10.0 (L) 2023    HCT 30.4 (L) 2023    MCV 93.7 2023     2023       Dialysis Treatment and Prescription reviewed    RX:  See dialysis flowsheet for specifics on access, blood flow rate, dialysate baths, duration of dialysis, anticoagulation and other technical information. COMMENTS:  Stable on dialysis. Continue to Target dry weight and clearance. Monitor closely for any hypotension    : Tolerating dialysis well, with no complications. Hypotension on dialysis-stable to improved. Good flow access. :Anemia. Stable. Continue Aransep. :Fluid Overload. Fluid removal as tolerated with dialysis. :Continue out patient Dialysis treatments. Today ready for discharge. Please refer to the orders.    Dialysis treatment plan and dialysis orders discussed with dialysis RN
PM assessment complete and documented. Meds given per MAR. Pt resting in bed. Denies needs or concerns at present. Will continue to  monitor.
Patient admitted to room 16 from ed. Patient oriented to room, call light, bed rails, phone, lights and bathroom. Patient instructed about the schedule of the day including: vital sign frequency, lab draws, possible tests, frequency of MD and staff rounds, including RN/MD rounding together at bedside, daily weights, and I &O's. Patient instructed about prescribed diet, how to use 8MENU, and television. bed alarm in place, patient aware of placement and reason. Bed locked, in lowest position, side rails up 2/4, call light within reach. Will continue to monitor.
Physician Progress Note      PATIENT:               Leonora Narvaez  CSN #:                  [id number]  :                       1975  ADMIT DATE:       2023 6:48 PM  [address] [place] DATE:    PROVIDER #:        Ninoska Jaime MD          QUERY TEXT:    Pt admitted with volume overload due to missing dialysis. Pt also noted to   have a history of CHF. CXR showed, \"Pulmonary vascular congestion. Cardiomegaly. \"  If possible, please document in the progress notes and   discharge summary if you are evaluating and/or treating any of the following: The medical record reflects the following:  Risk Factors: hx CHF, HTN, ESRD, missed dialysis  Clinical Indicators: admitted with volume overload due to missing dialysis; Pt   also noted to have a history of CHF. CXR showed, \"Pulmonary vascular   congestion. Cardiomegaly. \"  Treatment: dialysis, IV & po Lasix, labs, imaging, daily weights    Thank you! Caterina Coreas RN, BSN, RHIT, CCDS  Clinical   Options provided:  -- Noncardiogenic fluid overload due to missed dialysis  -- Fluid overload due acute on chronic CHF  -- Other - I will add my own diagnosis  -- Disagree - Not applicable / Not valid  -- Disagree - Clinically unable to determine / Unknown  -- Refer to Clinical Documentation Reviewer    PROVIDER RESPONSE TEXT:    This patient has noncardiogenic fluid overload due to missed dialysis.     Query created by: Aurelia Montanez on 2023 11:51 AM      Electronically signed by:  Ninoska Jaime MD 2023 9:22 AM
Progress Note - Dr. Ritu Aleman - Internal Medicine  PCP: Jia Sanchez MD [address] [facility] [place] [phone]    Hospital Day: 2  Code Status: Full Code  Current Diet: ADULT DIET; Regular; Low Potassium (Less than 3000 mg/day); Low Phosphorus (Less than 1000 mg)        CC: follow up on medical issues    Subjective:   Angelic Dwyer is a 52 y.o. female. Pt seen and examined  Chart reviewed since last visit, labs and imaging below      Awaiting HD today - tolerated HD yest ok  K is 4.6  Creat 6.3      Review of Systems: (1 system for EPF, 2-9 for detailed, 10+ for comprehensive)  Constitutional: Negative for chills and fever. HENT: Negative for dental problem, nosebleeds and rhinorrhea. Eyes: Negative for photophobia and visual disturbance. Respiratory: Negative for cough, chest tightness and shortness of breath. Cardiovascular: Negative for chest pain and leg swelling. Gastrointestinal: Negative for diarrhea, positive for nausea and vomiting. Endocrine: Negative for polydipsia and polyphagia. Genitourinary: Negative for frequency, hematuria and urgency. Musculoskeletal: Negative for back pain and myalgias. Skin: Negative for rash. Allergic/Immunologic: Negative for food allergies. Neurological: Negative for dizziness, seizures, syncope and facial asymmetry. Hematological: Negative for adenopathy. Psychiatric/Behavioral: Negative for dysphoric mood. The patient is not nervous/anxious. I have reviewed the patient's medical and social history in detail and updated the computerized patient record.   To recap: She  has a past medical history of Acute respiratory failure due to COVID-19 [facility]), Arterial ischemic stroke, ICA, left, acute ([facility] [address] Blind in both eyes, Cerebral artery occlusion with cerebral infarction [facility]), [facility]est[facility] Chronic kidney disease, COPD (chronic obstructive pulmonary disease) ([facility]
Pt back to room from dialysis.
Pt rapid COVID result negative.
Pt stated she was not receiving her full dose of Xanax. Her home dose is 2 mg extended relief. Informed pt the hospital does not have extended relief xanax and she is getting 0.5 mg Xanax QID to equal her home dose. Pt verbalized understanding. Will continue to monitor.
Pt to dialysis at this time.
Report given to 3a nurse. All questions answered. All belongings with pt.  Disconnected from ed monitoring system
Shift assessment completed, see flowsheets. Medications administered, see MAR. Plan of care discussed with patient. Fall precautions in place. Call light and bedside table within reach. Pt denies further needs at this time. Will continue to monitor.   Yashira Gomez RN
[facility]   Nephrology progress Note  [phone]   [email or web address]. com  Pt of Dr Mesfin Cantu   Will consult them   Loly Patrick MD
Treatment time: 2.5 hours  Net UF: 2400 ml     Pre weight: 83.6 kg  Post weight:81.6 kg  EDW: 86 kg    Access used: CVC    Access function: good with  ml/min     Summary of response to treatment: Patient tolerated treatment well and without any complications.  Patient remained stable throughout entire treatment
[facility] [address]   Phone: [phone]    Occupational Therapy    [x] Initial Evaluation            [] Daily Treatment Note         [] Discharge Summary      Patient: Young Crawford   : 1975   MRN: [id number]   Date of Service:  2023    Admitting Diagnosis:  Volume overload  Current Admission Summary:  The pt was admitted s/p a fall and missed dialysis. Past Medical History:  has a past medical history of Acute respiratory failure due to COVID-19 [facility]), Arterial ischemic stroke, ICA, left, acute ([facility] [address] Blind in both eyes, Cerebral artery occlusion with cerebral infarction [facility]), CHF (congestive heart failure) ([facility] [address] Chronic kidney disease, COPD (chronic obstructive pulmonary disease) ([facility] [address] Depression, Diabetes mellitus out of control, Diabetes mellitus, type II ([facility] [address] Diabetic neuropathy associated with type 2 diabetes mellitus ([facility] [address] Generalized headaches, Hypertension, Infertility, Insomnia, Migraine headache, Mixed hyperlipidemia, Otitis media, Pelvic abscess in female, Pneumonia, Stroke ([facility] [address] and Stroke ([facility] [address] Past Surgical History:  has a past surgical history that includes Cervix surgery; eye surgery; Foot surgery (Right); Foot surgery (Bilateral); Foot surgery (Left); IR TUNNELED CVC PLACE WO SQ PORT/PUMP > 5 YEARS (2021); and Dialysis fistula creation (Right, 2/10/2022). Discharge Recommendations: Young Crawford scored a 18/24 on the AM-PAC ADL Inpatient form. Current research shows that an AM-PAC score of 18 or greater is typically associated with a discharge to the patient's home setting. Based on the patient's AM-PAC score, and their current ADL deficits, it is recommended that the patient have 2-3 sessions per week of Occupational Therapy at d/c to increase the patient's independence. At this time, this patient demonstrates the endurance and safety to discharge home with OP and a follow up treatment frequency of 2-3x/wk.    Please see
dialysis orders discussed with dialysis RN   at bedside. Discussed with Patient. Thank you for allowing me to participate in this patient's care. Please do not hesitate to contact me for any questions/concerns. We will follow along with you.      Tammy Murray MD       [facility] [address]   Office: [phone] or Via "[employer]  Fax: [phone]
tablet 11    aspirin 81 MG EC tablet Take 1 tablet by mouth daily 30 tablet 11    olmesartan (BENICAR) 20 MG tablet Take 1 tablet by mouth nightly (Patient taking differently: Take 2 tablets by mouth nightly) 30 tablet 5    rizatriptan (MAXALT) 10 MG tablet Take 1 tablet by mouth once as needed for Migraine May repeat in 2 hours if needed 9 tablet 2    [employer] PEN NEEDLES 31G 31G X 8 MM MISC       furosemide (LASIX) 80 MG tablet Take 1 tablet by mouth daily      Handicap Placard MISC by Does not apply route Expires 5/26/2027 1 each 0       Patient is no longer taking Lantus 100 Unit/ML injection pen, and Humalog 100 Unit/ML    Timing of last doses updated.     Thank you,  Emmanuel Lopez hT
education    Goals  Patient Goals: return home with    Short Term Goals:  Time Frame: To be met prior to Dc  Patient will complete bed mobility at modified independent   Patient will complete transfers at supervision   Patient will ambulate 50 ft with use of rolling walker at stand by assistance  Patient will ascend/descend 1 stairs with (R) ascending handrail at contact guard assistance     Above goals reviewed on 4/27/2023. All goals are ongoing at this time unless indicated above.     Therapy Session Time      Individual Group Co-treatment   Time In     1509   Time Out     1551   Minutes     42     Timed Code Treatment Minutes:  27 Minutes  Total Treatment Minutes:  42       Electronically Signed By: Yfn Skelton PT DPT [id number]
throughout with mild old compression deformities of T12, L1, and L2 with no displaced or retropulsed fragments. Small central disc bulges at L3-4 and L4-5. Mild osteoarthritic changes of the facets throughout causing diffuse minimal spinal stenosis. XR CHEST PORTABLE    Result Date: 4/26/2023  EXAMINATION: ONE XRAY VIEW OF THE CHEST 4/26/2023 7:25 pm COMPARISON: Chest x-ray dated 09/02/2022. HISTORY: ORDERING SYSTEM PROVIDED HISTORY: weakness TECHNOLOGIST PROVIDED HISTORY: Reason for exam:->weakness Reason for Exam: weakness FINDINGS: LINES/TUBES/OTHER: Tunneled right IJ hemodialysis catheter is again noted. HEART/MEDIASTINUM: The cardiac silhouette is enlarged. PLEURA/LUNGS: There is pulmonary vascular congestion. There are no focal consolidations or pleural effusions. There is no appreciable pneumothorax. BONES/SOFT TISSUE: No acute abnormality. Pulmonary vascular congestion. Cardiomegaly. Lab Results   Component Value Date/Time    GLUCOSE 123 04/27/2023 05:21 AM     Lab Results   Component Value Date/Time    POCGLU 109 04/27/2023 07:54 AM     BP (!) 165/94   Pulse 96   Temp 97.5 °F (36.4 °C) (Oral)   Resp 16   Ht 5' 7\" (1.702 m)   Wt 189 lb 9.5 oz (86 kg)   SpO2 92%   BMI 29.69 kg/m²     Assessment and Plan:  Principal Problem:    Volume overload -Established problem. Stable. Plan: will require HD. Stressed importance of being compliant with outpt HD schedule  Active Problems:    Type 2 diabetes mellitus, with long-term current use of insulin ([address] -Established problem. Stable. Glu 123  Plan: Continue on CCC diet, home medications. CBC and BMP ordered to monitor sugars and for other medication complications. Fingerstick glucose ordered to check for alterations in levels throughout day. Sliding scale insulin ordered to cover fingerstick levels. HTN (hypertension), benign -Established problem. Stable. 165.94  Plan: Continue medications. Continue to check BP q shift.  CBC and BMP

## 2023-04-28 NOTE — DISCHARGE INSTR - COC
Peripheral edema R60.9    Pulmonary edema J81.1    Right sided numbness R20.0    Tobacco dependence F17.200    Chronic kidney disease (CKD), stage III (moderate) ([facility]) N18.30    H/O: CVA (cerebrovascular accident) Z86.73    HTN (hypertension), benign I10    DM (diabetes mellitus), secondary, uncontrolled, w/neurologic complic [id number]    Dyslipidemia E78.5    Smoker F17.200    Panic disorder F41.0    Isolated proteinuria R80.0    Diabetic peripheral neuropathy ([facility]) E11.42    Depression F32. 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, associated with type 2 diabetes mellitus ([facility]) Y38.3021    Cellulitis of right foot L03.115    Hidradenitis suppurativa L73.2    Hypocalcemia E83.51    Non-toxic multinodular goiter E04.2    Polyneuropathy due to type 2 diabetes mellitus ([facility]) E11.42    Proliferative diabetic retinopathy associated with type 2 diabetes mellitus ([facility] [address] N26.4251    End-stage renal disease on hemodialysis ([facility]) N18.6, Z99.2    Epiglottitis J05.10    Recurrent falls R29.6    Hypotension I95.9    Leukocytosis D72.829    Volume overload E87.70    Hemodialysis catheter dysfunction ([facility]) T82.41XA    Hypoproteinemia ([facility]) E77.8    GABRIELA (obstructive sleep apnea) G47.33    Type 2 diabetes mellitus with obesity ([facility]) E11.69, E66.9    Wheelchair dependence Z99.3    ESRD (end stage renal disease) ([facility]) N18.6    Hyperkalemia E87.5    Suspected COVID-19 virus infection Z20.822       Isolation/Infection:   Isolation            Droplet          Patient Infection Status       Infection Onset Added Last Indicated Last Indicated By Review Planned Expiration Resolved Resolved By    None active    Resolved    COVID-19 (Rule Out) 04/27/23 04/27/23 04/27/23 COVID-19, Rapid (Ordered)   04/27/23 Rule-Out Test Resulted    COVID-19 (Rule Out) 04/26/23 04/26/23 04/26/23 COVID-19, Rapid (Ordered)   04/26/23 Rule-Out Test Resulted    COVID-19 (Rule Out) 02/07/22

## 2023-05-11 ENCOUNTER — HOSPITAL ENCOUNTER (OUTPATIENT)
Dept: PHYSICAL THERAPY | Age: 48
Setting detail: THERAPIES SERIES
Discharge: HOME | End: 2023-05-11
Payer: MEDICARE

## 2023-05-11 PROCEDURE — 97161 PT EVAL LOW COMPLEX 20 MIN: CPT

## 2023-05-11 PROCEDURE — 97112 NEUROMUSCULAR REEDUCATION: CPT

## 2023-05-11 PROCEDURE — 97110 THERAPEUTIC EXERCISES: CPT

## 2023-05-11 NOTE — PLAN OF CARE
progression of HEP. HEP instruction: Written HEP instructions provided and reviewed. GOALS:  Patient stated goal: reduce falls   [] Progressing: [] Met: [] Not Met: [] Adjusted    Therapist goals for Patient:   Short Term Goals: To be achieved in: 2 weeks  1. Independent in HEP and progression per patient tolerance, in order to prevent re-injury. [] Progressing: [] Met: [] Not Met: [] Adjusted  2. Patient will have a decrease in pain to facilitate improvement in movement, function, and ADLs as indicated by Functional Deficits. [] Progressing: [] Met: [] Not Met: [] Adjusted    Long Term Goals: To be achieved in: 5 weeks  1. Pt will improve CHERRY score to 45 or greater to show reduced risk for falls. [] Progressing: [] Met: [] Not Met: [] Adjusted  2. Patient will demonstrate ability to complete 6 min walk test with no seated breaks and RW to progress towards amb community distances without difficulty. [] Progressing: [] Met: [] Not Met: [] Adjusted  3. Patient will demonstrate an increase in Strength in B LEs to 4+/5 or greater to allow for proper functional mobility as indicated by patients Functional Deficits. [] Progressing: [] Met: [] Not Met: [] Adjusted  4. Patient will return to functional activities including picking up objects from the floor without falling forward.    [] Progressing: [] Met: [] Not Met: [] Adjusted      Electronically signed by:  Maddie Montiel PT, DPT

## 2023-05-11 NOTE — FLOWSHEET NOTE
[address] [facility] Physical Therapy  Phone: [phone]   Fax: [phone]    Physical Therapy Daily Treatment Note    Date:  2023     Patient Name:  Micah Beard    :  1975  MRN: [id number]  Medical Diagnosis:  ESRD (end stage renal disease) ([facility] [address] [N18.6]  Treatment Diagnosis: decreased LE strength, poor endurance, at risk for falls      Insurance/Certification information:  PT Insurance Information: Medicare, Medicaid (no auth, no copay)  Physician Information:  Katerin Howard MD    Plan of care signed (Y/N): []  Yes [x]  No     Date of Patient follow up with Physician:      Progress Report: []  Yes  [x]  No     Date Range for reporting period:  Beginnin2023  Ending:     Progress report due (10 Rx/or 30 days whichever is less): visit #10 or       Recertification due (POC duration/ or 90 days whichever is less): visit #10 or 23      Visit # Insurance Allowable Auth required?  Date Range   1/10 Med nec []  Yes  [x]  No N/a       Latex Allergy:  [x]NO      []YES  Preferred Language for Healthcare:   [x]English       []other:    Functional Scale:       Date assessed:  CHERRY = ; dysfunction = 28.5%                       23    Pain level:  2/10     SUBJECTIVE:  See eval    OBJECTIVE: See eval      RESTRICTIONS/PRECAUTIONS: macular degeneration, ESRD, CHF    Exercises/Interventions:     Therapeutic Exercises (20012) Resistance / level Sets/sec Reps Notes                                                                  Therapeutic Activities (48177)                                   Gait (42860)                                   Neuromuscular Re-ed (56660)       CHERRY   x1 40/56                                      Manual Intervention (81791)                                                     Modalities:     Pt. Education:  2023  -patient educated on diagnosis, prognosis and expectations for rehab  -all patient questions were

## 2023-05-12 ENCOUNTER — TELEPHONE (OUTPATIENT)
Dept: FAMILY MEDICINE CLINIC | Age: 48
End: 2023-05-12

## 2023-05-12 NOTE — TELEPHONE ENCOUNTER
I did not order those CT results, but her most recent ones on file (which I'm assuming she's referring to) were done on 4/26/2023. Her CT head was a limited study due to motion artifact. With that said, no acute intracranial findings were noted. Overall stable findings were seen. Her CT lumbar spine was notable for:    -A large bone spur of her L1-L2 area   -Moderate arthritis of that area. Small bulging discs of the L3-4 and L4-5 area. If she needs a hard copy of her results then please mail it to her. A MRI lumbar spine would be a better imaging tool to evaluate her spine. If she would like a MRI done then she'll need to reschedule an appointment with me for that. Damon Ang 177

## 2023-05-18 ENCOUNTER — OFFICE VISIT (OUTPATIENT)
Dept: FAMILY MEDICINE CLINIC | Age: 48
End: 2023-05-18

## 2023-05-18 VITALS
HEIGHT: 67 IN | SYSTOLIC BLOOD PRESSURE: 138 MMHG | DIASTOLIC BLOOD PRESSURE: 68 MMHG | OXYGEN SATURATION: 98 % | HEART RATE: 109 BPM | WEIGHT: 190 LBS | BODY MASS INDEX: 29.82 KG/M2

## 2023-05-18 DIAGNOSIS — E66.9 TYPE 2 DIABETES MELLITUS WITH OBESITY (HCC): ICD-10-CM

## 2023-05-18 DIAGNOSIS — Z71.2 ENCOUNTER TO DISCUSS TEST RESULTS: ICD-10-CM

## 2023-05-18 DIAGNOSIS — F41.0 GENERALIZED ANXIETY DISORDER WITH PANIC ATTACKS: ICD-10-CM

## 2023-05-18 DIAGNOSIS — F19.20 CONTROLLED DRUG DEPENDENCE (HCC): ICD-10-CM

## 2023-05-18 DIAGNOSIS — F41.1 GENERALIZED ANXIETY DISORDER WITH PANIC ATTACKS: ICD-10-CM

## 2023-05-18 DIAGNOSIS — I12.0 TYPE 2 DM WITH HYPERTENSION AND ESRD ON DIALYSIS (HCC): Primary | ICD-10-CM

## 2023-05-18 DIAGNOSIS — M51.36 DEGENERATION OF INTERVERTEBRAL DISC OF LUMBAR REGION WITH OSTEOPHYTE OF LUMBAR VERTEBRA: ICD-10-CM

## 2023-05-18 DIAGNOSIS — Z99.89 DEPENDENT ON WALKER FOR AMBULATION: ICD-10-CM

## 2023-05-18 DIAGNOSIS — E78.5 TYPE 2 DIABETES MELLITUS WITH HYPERLIPIDEMIA (HCC): ICD-10-CM

## 2023-05-18 DIAGNOSIS — M25.78 DEGENERATION OF INTERVERTEBRAL DISC OF LUMBAR REGION WITH OSTEOPHYTE OF LUMBAR VERTEBRA: ICD-10-CM

## 2023-05-18 DIAGNOSIS — E11.69 TYPE 2 DIABETES MELLITUS WITH OBESITY (HCC): ICD-10-CM

## 2023-05-18 DIAGNOSIS — E11.22 TYPE 2 DM WITH HYPERTENSION AND ESRD ON DIALYSIS (HCC): Primary | ICD-10-CM

## 2023-05-18 DIAGNOSIS — R93.7 ABNORMAL CT SCAN, LUMBAR SPINE: ICD-10-CM

## 2023-05-18 DIAGNOSIS — N18.6 TYPE 2 DM WITH HYPERTENSION AND ESRD ON DIALYSIS (HCC): Primary | ICD-10-CM

## 2023-05-18 DIAGNOSIS — Z02.89 MEDICATION MANAGEMENT CONTRACT AGREEMENT: ICD-10-CM

## 2023-05-18 DIAGNOSIS — F13.20 BENZODIAZEPINE DEPENDENCE, CONTINUOUS (HCC): ICD-10-CM

## 2023-05-18 DIAGNOSIS — E11.69 TYPE 2 DIABETES MELLITUS WITH HYPERLIPIDEMIA (HCC): ICD-10-CM

## 2023-05-18 DIAGNOSIS — Z99.2 TYPE 2 DM WITH HYPERTENSION AND ESRD ON DIALYSIS (HCC): Primary | ICD-10-CM

## 2023-05-18 RX ORDER — OLMESARTAN MEDOXOMIL 40 MG/1
40 TABLET ORAL NIGHTLY
Qty: 30 TABLET | Refills: 5 | Status: SHIPPED | OUTPATIENT
Start: 2023-05-18

## 2023-05-18 RX ORDER — ALPRAZOLAM 2 MG/1
TABLET, EXTENDED RELEASE ORAL
Qty: 30 TABLET | Refills: 0 | Status: SHIPPED | OUTPATIENT
Start: 2023-05-18 | End: 2023-06-17

## 2023-05-18 SDOH — ECONOMIC STABILITY: FOOD INSECURITY: WITHIN THE PAST 12 MONTHS, YOU WORRIED THAT YOUR FOOD WOULD RUN OUT BEFORE YOU GOT MONEY TO BUY MORE.: NEVER TRUE

## 2023-05-18 SDOH — ECONOMIC STABILITY: INCOME INSECURITY: HOW HARD IS IT FOR YOU TO PAY FOR THE VERY BASICS LIKE FOOD, HOUSING, MEDICAL CARE, AND HEATING?: NOT HARD AT ALL

## 2023-05-18 SDOH — ECONOMIC STABILITY: FOOD INSECURITY: WITHIN THE PAST 12 MONTHS, THE FOOD YOU BOUGHT JUST DIDN'T LAST AND YOU DIDN'T HAVE MONEY TO GET MORE.: NEVER TRUE

## 2023-05-18 SDOH — ECONOMIC STABILITY: HOUSING INSECURITY
IN THE LAST 12 MONTHS, WAS THERE A TIME WHEN YOU DID NOT HAVE A STEADY PLACE TO SLEEP OR SLEPT IN A SHELTER (INCLUDING NOW)?: NO

## 2023-05-18 ASSESSMENT — ENCOUNTER SYMPTOMS
CONSTIPATION: 0
TROUBLE SWALLOWING: 0
RHINORRHEA: 0
CHEST TIGHTNESS: 0
SHORTNESS OF BREATH: 0
NAUSEA: 0
ABDOMINAL PAIN: 0
BLOOD IN STOOL: 0
WHEEZING: 0
VOMITING: 0
COUGH: 0
ABDOMINAL DISTENTION: 0
DIARRHEA: 0
SINUS PRESSURE: 0
BACK PAIN: 0

## 2023-05-23 ENCOUNTER — HOSPITAL ENCOUNTER (OUTPATIENT)
Dept: PHYSICAL THERAPY | Age: 48
Setting detail: THERAPIES SERIES
Discharge: HOME | End: 2023-05-23
Payer: MEDICARE

## 2023-05-23 PROCEDURE — 97116 GAIT TRAINING THERAPY: CPT

## 2023-05-23 PROCEDURE — 97110 THERAPEUTIC EXERCISES: CPT

## 2023-05-23 NOTE — FLOWSHEET NOTE
[facility]  Phone: [phone]   Fax: [phone]    Physical Therapy Daily Treatment Note    Date:  2023     Patient Name:  Hilary Stapleton    :  1975  MRN: [id number]  Medical Diagnosis:  ESRD (end stage renal disease) ([facility] [address] [N18.6]  Treatment Diagnosis: decreased LE strength, poor endurance, at risk for falls      Insurance/Certification information:  PT Insurance Information: Medicare, Medicaid (no auth, no copay)  Physician Information:  Ana Glass MD    Plan of care signed (Y/N): []  Yes [x]  No     Date of Patient follow up with Physician:      Progress Report: []  Yes  [x]  No     Date Range for reporting period:  Beginnin2023  Ending:     Progress report due (10 Rx/or 30 days whichever is less): visit #10 or       Recertification due (POC duration/ or 90 days whichever is less): visit #10 or 23      Visit # Insurance Allowable Auth required? Date Range   2/10 Med nec []  Yes  [x]  No N/a       Latex Allergy:  [x]NO      []YES  Preferred Language for Healthcare:   [x]English       []other:    CT scan on :  IMPRESSION:  Large fused osteophytes anterior to the L1-2 disc space with a linear lucency  seen through the osteophyte which is more apparent and could represent a  nondisplaced fracture through the osteophyte. Recommend clinical follow-up  and suggest MRI correlation if indicated. Moderate degenerative disc changes throughout with mild old compression  deformities of T12, L1, and L2 with no displaced or retropulsed fragments. Small central disc bulges at L3-4 and L4-5. Mild osteoarthritic changes of the facets throughout causing diffuse minimal  spinal stenosis. Functional Scale:       Date assessed:  CHERRY = 40/56; dysfunction = 28.5%                       23    Pain level:  0/10     SUBJECTIVE:  Pt reports she is doing okay.  Still using wheelchair for long distances but does not

## 2023-05-25 ENCOUNTER — HOSPITAL ENCOUNTER (OUTPATIENT)
Dept: PHYSICAL THERAPY | Age: 48
Setting detail: THERAPIES SERIES
Discharge: HOME | End: 2023-05-25
Payer: MEDICARE

## 2023-05-25 PROCEDURE — 97112 NEUROMUSCULAR REEDUCATION: CPT

## 2023-05-25 PROCEDURE — 97116 GAIT TRAINING THERAPY: CPT

## 2023-05-25 PROCEDURE — 97110 THERAPEUTIC EXERCISES: CPT

## 2023-05-25 NOTE — FLOWSHEET NOTE
later today. Usually doesn't do much on the weekends due to fatigue from the week.      OBJECTIVE:   5/23: presented to therapy in a wheelchair     RESTRICTIONS/PRECAUTIONS: macular degeneration, ESRD, CHF    Exercises/Interventions:     Therapeutic Exercises (87600) Resistance / level Sets/sec Reps Notes   Step one  Level 1   X5 min     Gastroc stretch on IB  HR/TR     HS stretch on steps    Sit to stands from standard chair  2 UE support, unable to complete 0-1 UE support; on first attempt pt reaching for  and ended up on her knees on the first step of staircase- able to get up independently, no injury    Butt taps to chair with airex pad  5/23   Squats with B UEs on bars  1 x15 5/25 - VCs for hips behind, not knees forward                        Therapeutic Activities (89511)                                   Gait (31734)       Gait training with rollator   X160 feet 5/23, 5/25: VCs for wider ROMAINE and lifting R toes so they don't drag   Forward step ups  Lateral step up and overs 6 inch   6 inch  1  1 X10 B  X10 B (not trusting of R LE) 5/23, 5/25: B UE support, VCs for erect posture and keeping feet apart                  Neuromuscular Re-ed (20378)       CHERRY   Airex:  Marches  Normal ROMAINE with head turns  Normal ROMAINE with EC    Blue wedge DF balance   Blue wedge PF balance   \" With arm swings            X 1 min  X 1 min  20 sec x 2    X 1 min   X 1min  X 1 min                                 Manual Intervention (41928)                                                     Modalities:     Pt. Education:  05/11/2023  -patient educated on diagnosis, prognosis and expectations for rehab  -all patient questions were answered  -educated pt her wheelchair is slightly too high (likely due to the cushion),feet should be resting on floor   -educated on CHERRY score and meaning    Home Exercise Program:  5/25: added mini squats at kitchen sink    5/11: walking with RW with feet further apart      Therapeutic Exercise and

## 2023-05-27 DIAGNOSIS — E66.9 TYPE 2 DIABETES MELLITUS WITH OBESITY (HCC): ICD-10-CM

## 2023-05-27 DIAGNOSIS — E11.42 TYPE 2 DIABETES MELLITUS WITH DIABETIC POLYNEUROPATHY, WITH LONG-TERM CURRENT USE OF INSULIN (HCC): ICD-10-CM

## 2023-05-27 DIAGNOSIS — E11.69 TYPE 2 DIABETES MELLITUS WITH OBESITY (HCC): ICD-10-CM

## 2023-05-27 DIAGNOSIS — Z79.4 TYPE 2 DIABETES MELLITUS WITH DIABETIC POLYNEUROPATHY, WITH LONG-TERM CURRENT USE OF INSULIN (HCC): ICD-10-CM

## 2023-05-30 ENCOUNTER — HOSPITAL ENCOUNTER (OUTPATIENT)
Dept: PHYSICAL THERAPY | Age: 48
Setting detail: THERAPIES SERIES
Discharge: HOME | End: 2023-05-30
Payer: MEDICARE

## 2023-05-30 RX ORDER — DULAGLUTIDE 3 MG/.5ML
INJECTION, SOLUTION SUBCUTANEOUS
Qty: 2 ML | OUTPATIENT
Start: 2023-05-30

## 2023-05-30 NOTE — PROGRESS NOTES
[address]     Physical Therapy  Cancellation/No-show Note  Patient Name:  Bebo Lincoln  :  1975   Date:  2023  Cancelled visits to date: 1  No-shows to date: 0    Patient status for today's appointment patient:  [x]  Cancelled   []  Rescheduled appointment  []  No-show     Reason given by patient:  []  Patient ill  []  Conflicting appointment  []  No transportation    []  Conflict with work  []  No reason given  [x]  Other:  pt fell at home   Comments:      Phone call information:   []  Phone call made today to patient at _ time at number provided:      []  Patient answered, conversation as follows:    []  Patient did not answer, message left as follows:  []  Phone call not made today  [x]  Phone call not needed - pt contacted us to cancel and provided reason for cancellation.      Electronically signed by:  Rosendo Lombardi, PT, DPT

## 2023-06-01 ENCOUNTER — HOSPITAL ENCOUNTER (OUTPATIENT)
Dept: PHYSICAL THERAPY | Age: 48
Setting detail: THERAPIES SERIES
Discharge: HOME | End: 2023-06-01
Payer: MEDICARE

## 2023-06-01 DIAGNOSIS — E66.9 TYPE 2 DIABETES MELLITUS WITH OBESITY (HCC): ICD-10-CM

## 2023-06-01 DIAGNOSIS — E11.69 TYPE 2 DIABETES MELLITUS WITH OBESITY (HCC): ICD-10-CM

## 2023-06-01 DIAGNOSIS — E11.42 TYPE 2 DIABETES MELLITUS WITH DIABETIC POLYNEUROPATHY, WITH LONG-TERM CURRENT USE OF INSULIN (HCC): ICD-10-CM

## 2023-06-01 DIAGNOSIS — Z79.4 TYPE 2 DIABETES MELLITUS WITH DIABETIC POLYNEUROPATHY, WITH LONG-TERM CURRENT USE OF INSULIN (HCC): ICD-10-CM

## 2023-06-01 PROCEDURE — 97110 THERAPEUTIC EXERCISES: CPT

## 2023-06-01 PROCEDURE — 97112 NEUROMUSCULAR REEDUCATION: CPT

## 2023-06-01 PROCEDURE — 97116 GAIT TRAINING THERAPY: CPT

## 2023-06-01 NOTE — FLOWSHEET NOTE
were answered  -educated pt her wheelchair is slightly too high (likely due to the cushion),feet should be resting on floor   -educated on CHERRY score and meaning    Home Exercise Program:  5/25: added mini squats at kitchen sink    5/11: walking with RW with feet further apart      Therapeutic Exercise and NMR EXR  [x] (65335) Provided verbal/tactile cueing for activities related to strengthening, flexibility, endurance, ROM for improvements in  [x] LE / Lumbar: LE, proximal hip, and core control with self care, mobility, lifting, ambulation. [] UE / Cervical: cervical, postural, scapular, scapulothoracic and UE control with self care, reaching, carrying, lifting, house/yardwork, driving, computer work.  [] (47278) Provided verbal/tactile cueing for activities related to improving balance, coordination, kinesthetic sense, posture, motor skill, proprioception to assist with   [] LE / lumbar: LE, proximal hip, and core control in self care, mobility, lifting, ambulation and eccentric single leg control. [] UE / cervical: cervical, scapular, scapulothoracic and UE control with self care, reaching, carrying, lifting, house/yardwork, driving, computer work.   [] (60738) Therapist is in constant attendance of 2 or more patients providing skilled therapy interventions, but not providing any significant amount of measurable one-on-one time to either patient, for improvements in  [] LE / lumbar: LE, proximal hip, and core control in self care, mobility, lifting, ambulation and eccentric single leg control. [] UE / cervical: cervical, scapular, scapulothoracic and UE control with self care, reaching, carrying, lifting, house/yardwork, driving, computer work.      NMR and Therapeutic Activities:    [x] (89612 or 82107) Provided verbal/tactile cueing for activities related to improving balance, coordination, kinesthetic sense, posture, motor skill, proprioception and motor activation to allow for proper function of   [x]

## 2023-06-02 DIAGNOSIS — Z99.2 TYPE 2 DM WITH HYPERTENSION AND ESRD ON DIALYSIS (HCC): ICD-10-CM

## 2023-06-02 DIAGNOSIS — N18.6 TYPE 2 DM WITH HYPERTENSION AND ESRD ON DIALYSIS (HCC): ICD-10-CM

## 2023-06-02 DIAGNOSIS — E11.22 TYPE 2 DM WITH HYPERTENSION AND ESRD ON DIALYSIS (HCC): ICD-10-CM

## 2023-06-02 DIAGNOSIS — I12.0 TYPE 2 DM WITH HYPERTENSION AND ESRD ON DIALYSIS (HCC): ICD-10-CM

## 2023-06-02 RX ORDER — CLONIDINE HYDROCHLORIDE 0.1 MG/1
TABLET ORAL
Qty: 180 TABLET | Refills: 0 | Status: SHIPPED | OUTPATIENT
Start: 2023-06-02

## 2023-06-02 RX ORDER — DULAGLUTIDE 3 MG/.5ML
3 INJECTION, SOLUTION SUBCUTANEOUS WEEKLY
Qty: 4 ADJUSTABLE DOSE PRE-FILLED PEN SYRINGE | Refills: 2 | Status: SHIPPED | OUTPATIENT
Start: 2023-06-02

## 2023-06-02 NOTE — TELEPHONE ENCOUNTER
Medication:   Requested Prescriptions     Pending Prescriptions Disp Refills    TRULICSocialMedia305 3 TL/0.0GV SOPN [Pharmacy Med Name: TRULICITY 3 VE/7.9 ML PEN] 2 mL      Sig: INJECT 3 MG UNDER THE SKIN ONCE WEEKLY     Last Filled:  1/5/23    Last appt: 5/18/2023   Next appt: 6/2/2023    Last Labs DM:   Lab Results   Component Value Date/Time    LABA1C 6.4 01/05/2023 11:36 PM

## 2023-06-02 NOTE — TELEPHONE ENCOUNTER
Medication:   Requested Prescriptions     Pending Prescriptions Disp Refills    cloNIDine (CATAPRES) 0.1 MG tablet [Pharmacy Med Name: cloNIDine HCL 0.1MG TABLET] 180 tablet 0     Sig: TAKE ONE TABLET BY MOUTH EVERY 8 HOURS AS NEEDED FOR HIGH BLOOD PRESSURE (SYSTOLIC BLOOD PRESSURE GREATER THAN 974 AND / OR DIASTOLIC BLOOD PRESSURE GREATER THAN 110)     Last Filled:  4/6/23    Last appt: 5/18/2023   Next appt: 7/6/2023    Last OARRS: No flowsheet data found.

## 2023-06-06 ENCOUNTER — HOSPITAL ENCOUNTER (OUTPATIENT)
Dept: PHYSICAL THERAPY | Age: 48
Setting detail: THERAPIES SERIES
Discharge: HOME | End: 2023-06-06
Payer: MEDICARE

## 2023-06-06 NOTE — PROGRESS NOTES
[address]     Physical Therapy  Cancellation/No-show Note  Patient Name:  Mery Martin  :  1975   Date:  2023  Cancelled visits to date: 2  No-shows to date: 0    Patient status for today's appointment patient:  [x]  Cancelled ,   []  Rescheduled appointment  []  No-show     Reason given by patient:  []  Patient ill  []  Conflicting appointment  []  No transportation    []  Conflict with work  [x]  No reason given  []  Other:  pt fell at home   Comments:      Phone call information:   []  Phone call made today to patient at _ time at number provided:      []  Patient answered, conversation as follows:    []  Patient did not answer, message left as follows:  []  Phone call not made today  [x]  Phone call not needed - pt contacted us to cancel and provided reason for cancellation.      Electronically signed by:  Magui Hill, PT, DPT

## 2023-06-08 ENCOUNTER — HOSPITAL ENCOUNTER (OUTPATIENT)
Dept: PHYSICAL THERAPY | Age: 48
Setting detail: THERAPIES SERIES
Discharge: HOME | End: 2023-06-08
Payer: MEDICARE

## 2023-06-08 PROCEDURE — 97116 GAIT TRAINING THERAPY: CPT

## 2023-06-08 PROCEDURE — 97110 THERAPEUTIC EXERCISES: CPT

## 2023-06-08 PROCEDURE — 97112 NEUROMUSCULAR REEDUCATION: CPT

## 2023-06-08 NOTE — FLOWSHEET NOTE
[] LE: core, proximal hip and LE for self care, mobility, lifting, and ambulation/stair navigation    [] UE / Cervical: cervical, postural,  scapular, scapulothoracic and UE control with self care, reaching, carrying, lifting, house/yardwork, driving, computer work    Manual Treatments:  PROM / STM / Oscillations-Mobs:  G-I, II, III, IV (PA's, Inf., Post.)  [] (89041) Provided manual therapy to mobilize LE, proximal hip and/or LS spine soft tissue/joints for the purpose of modulating pain, promoting relaxation,  increasing ROM, reducing/eliminating soft tissue swelling/inflammation/restriction, improving soft tissue extensibility and allowing for proper ROM for normal function with   [] LE / lumbar: self care, mobility, lifting and ambulation. [] UE / Cervical: self care, reaching, carrying, lifting, house/yardwork, driving, computer work. Modalities:  [] (44451) Vasopneumatic compression: Utilized vasopneumatic compression to decrease edema / swelling for the purpose of improving mobility and quad tone / recruitment which will allow for increased overall function including but not limited to self-care, transfers, ambulation, and ascending / descending stairs. Charges:  Timed Code Treatment Minutes: 41   Total Treatment Minutes: 41     [] EVAL - LOW (27579)   [] EVAL - MOD (95900)  [] EVAL - HIGH (53158)  [] RE-EVAL (83380)  [x] QZ(83962) x   1    [] Ionto  [x] NMR (88545) x  1     [] Vaso  [] Manual (99287) x       [] Ultrasound  [] TA x        [] Mech Traction (20423)  [] Aquatic Therapy x     [] ES (un) (75027):   [] Home Management Training x  [] ES(attended) (75158)   [] Dry Needling 1-2 muscles (71928):  [] Dry Needling 3+ muscles (419169)  [] Group:      [x] Other: gait x 1    GOALS:   Patient stated goal: reduce falls   [] Progressing: [] Met: [] Not Met: [] Adjusted    Therapist goals for Patient:   Short Term Goals: To be achieved in: 2 weeks  1.  Independent in HEP and progression per

## 2023-06-20 ENCOUNTER — HOSPITAL ENCOUNTER (OUTPATIENT)
Dept: PHYSICAL THERAPY | Age: 48
Setting detail: THERAPIES SERIES
Discharge: HOME | End: 2023-06-20
Payer: MEDICARE

## 2023-06-20 NOTE — PROGRESS NOTES
[address]     Physical Therapy  Cancellation/No-show Note  Patient Name:  Ernestina Modi  :  1975   Date:  2023  Cancelled visits to date: 3  No-shows to date: 0    Patient status for today's appointment patient:  [x]  Cancelled , , ,   []  Rescheduled appointment  []  No-show     Reason given by patient:  [x]  Patient ill  []  Conflicting appointment  []  No transportation    []  Conflict with work  []  No reason given  []  Other:  parking lot at apartment complex being resealed, unable to leave    Comments:      Phone call information:   []  Phone call made today to patient at _ time at number provided:      []  Patient answered, conversation as follows:    []  Patient did not answer, message left as follows:  []  Phone call not made today  [x]  Phone call not needed - pt contacted us to cancel and provided reason for cancellation.      Electronically signed by:  Samm Fuller, PT, DPT

## 2023-06-21 DIAGNOSIS — Z79.4 TYPE 2 DIABETES MELLITUS WITH DIABETIC POLYNEUROPATHY, WITH LONG-TERM CURRENT USE OF INSULIN (HCC): ICD-10-CM

## 2023-06-21 DIAGNOSIS — E11.69 TYPE 2 DIABETES MELLITUS WITH OBESITY (HCC): ICD-10-CM

## 2023-06-21 DIAGNOSIS — E66.9 TYPE 2 DIABETES MELLITUS WITH OBESITY (HCC): ICD-10-CM

## 2023-06-21 DIAGNOSIS — E11.42 TYPE 2 DIABETES MELLITUS WITH DIABETIC POLYNEUROPATHY, WITH LONG-TERM CURRENT USE OF INSULIN (HCC): ICD-10-CM

## 2023-06-21 RX ORDER — DULAGLUTIDE 3 MG/.5ML
INJECTION, SOLUTION SUBCUTANEOUS
Qty: 2 ML | OUTPATIENT
Start: 2023-06-21

## 2023-06-22 ENCOUNTER — HOSPITAL ENCOUNTER (OUTPATIENT)
Dept: PHYSICAL THERAPY | Age: 48
Setting detail: THERAPIES SERIES
Discharge: HOME | End: 2023-06-22
Payer: MEDICARE

## 2023-06-22 NOTE — PROGRESS NOTES
[address]     Physical Therapy  Cancellation/No-show Note  Patient Name:  Ernestina Modi  :  1975   Date:  2023  Cancelled visits to date: 4  No-shows to date: 1    Patient status for today's appointment patient:  []  [facility] , , ,   []  Rescheduled appointment  [x]  No-show      Reason given by patient:  []  Patient ill  []  Conflicting appointment  []  No transportation    []  Conflict with work  [x]  No reason given  []  Other:  parking lot at apartment complex being resealed, unable to leave    Comments:      Phone call information:   [x]  Phone call made today to patient at 2:12 pm at number provided      [x]  Patient answered, conversation as follows: lost a lot of blood earlier this week when at dialysis, still weak and recovering today, will call to scheudle more visits in a few days    []  Patient did not answer, message left as follows:  []  Phone call not made today  []  Phone call not needed - pt contacted us to cancel and provided reason for cancellation.      Electronically signed by:  Samm Fuller, PT, DPT

## 2023-06-25 NOTE — FLOWSHEET NOTE
09/07/22 0837 09/07/22 1157 09/07/22 1202   Treatment   Time On 0837  --   --    Time Off  --   --  1157   Vital Signs   BP (!) 177/81 (!) 156/84 137/72   Temp 96.8 °F (36 °C) 97.8 °F (36.6 °C) 98.7 °F (37.1 °C)   Heart Rate 90 84 (!) 101   Resp 16 16 16   Weight 206 lb 2.1 oz (93.5 kg) 199 lb 4.7 oz (90.4 kg)  --    Percent Weight Change 0 0  --    Hemodialysis Central Access Right Neck   Placement Date/Time: 09/06/22 1442   Present on Admission/Arrival: Yes  Inserted by: Dr. Evelina Soni  Insertion Practices: Chlorohexadine skin antisepsis  Orientation: Right  Access Location: Neck  Total Catheter Length (cm): 19 centimeters   Site Assessment  --   --  Clean, dry & intact   Dressing Type  --   --  Antimicrobial;Sterile dressing, transparent   Date of Last Dressing Change  --   --  09/06/22   Dressing Status  --   --  Clean, dry & intact; Clean;Dry   Dressing Change Due  --   --  09/13/22   Treatment time: 3 hours  Net UF: 3000 ml    Pre weight: 93.5 kg   Post weight: 90.4 kg  EDW: 90.5 kg    Access used: [place] [facility] [place] TD  Access function: GOOD with  ml/min    Medications or blood products given: RETACRIT 10,000 UNITS IVP    Regular outpatient schedule: [address] [facility] [facility]    Summary of response to treatment: GOOD    Copy of dialysis treatment record placed in chart, to be scanned into EMR. 4% solution/To be discontinued when line removed

## 2023-07-03 NOTE — PROGRESS NOTES
Shekhar Calvert   50 y.o. female   1975    HPI:    Patient was last seen by me on 5/18/2023. During our last office visit, the main issue(s) that we focused on were:   -Referred to [facility] PT for chronic back pain. Reason(s) for visit:   Chief Complaint   Patient presents with    Follow-up     Pt reported that dialysis was concerned with pt's clotting factor. Did not have labs drawn for it. Medication Refill     -Interval history-    [] No new significant health event(s)/problem(s) occurred since our last office visit. [] No new health issues/concerns discussed during today's office visit. [] New health issue(s)/concern(s):    [x] Other noteworthy comment(s):     Patient was seen for back pain before. She had a CT lumbar spine. Patient had a CT lumbar spine:     IMPRESSION:  Large fused osteophytes anterior to the L1-2 disc space with a linear lucency seen through the osteophyte which is more apparent and could represent a nondisplaced fracture through the osteophyte. Recommend clinical follow-up and suggest MRI correlation if indicated. Moderate degenerative disc changes throughout with mild old compression deformities of T12, L1, and L2 with no displaced or retropulsed fragments. Small central disc bulges at L3-4 and L4-5. Mild osteoarthritic changes of the facets throughout causing diffuse minimal spinal stenosis. Patient has began PT at [facility] and has done well.    -Chronic health issue(s)-    ESRD on dialysis:  -Managed by nephrology. Updates:  -Patient is in touch with [facility] transplant team.  She's required to have colonoscopy and mammogram screening. She's not had any of those done as of yet. -She has to maintain 90% attendance in order to be part of the renal transplant program.  -She takes Furosemide PRN. Anxiety/depression:  -Meds tried: Sertraline, Fluoxetine, Fluvoxamine, Xanax, Bupropion, Alprazolam, Viibryd.   -Wibiyaight

## 2023-07-06 ENCOUNTER — OFFICE VISIT (OUTPATIENT)
Dept: FAMILY MEDICINE CLINIC | Age: 48
End: 2023-07-06

## 2023-07-06 VITALS
TEMPERATURE: 97.6 F | DIASTOLIC BLOOD PRESSURE: 80 MMHG | OXYGEN SATURATION: 97 % | BODY MASS INDEX: 30.92 KG/M2 | HEART RATE: 105 BPM | SYSTOLIC BLOOD PRESSURE: 140 MMHG | WEIGHT: 197.4 LBS

## 2023-07-06 DIAGNOSIS — F41.1 GENERALIZED ANXIETY DISORDER WITH PANIC ATTACKS: ICD-10-CM

## 2023-07-06 DIAGNOSIS — Z13.21 SCREENING FOR ENDOCRINE, NUTRITIONAL, METABOLIC AND IMMUNITY DISORDER: ICD-10-CM

## 2023-07-06 DIAGNOSIS — I12.0 TYPE 2 DM WITH HYPERTENSION AND ESRD ON DIALYSIS (HCC): Primary | ICD-10-CM

## 2023-07-06 DIAGNOSIS — E11.22 TYPE 2 DM WITH HYPERTENSION AND ESRD ON DIALYSIS (HCC): Primary | ICD-10-CM

## 2023-07-06 DIAGNOSIS — F41.0 GENERALIZED ANXIETY DISORDER WITH PANIC ATTACKS: ICD-10-CM

## 2023-07-06 DIAGNOSIS — E78.5 TYPE 2 DIABETES MELLITUS WITH HYPERLIPIDEMIA (HCC): ICD-10-CM

## 2023-07-06 DIAGNOSIS — J44.9 CHRONIC OBSTRUCTIVE PULMONARY DISEASE, UNSPECIFIED COPD TYPE (HCC): ICD-10-CM

## 2023-07-06 DIAGNOSIS — Z13.228 SCREENING FOR ENDOCRINE, NUTRITIONAL, METABOLIC AND IMMUNITY DISORDER: ICD-10-CM

## 2023-07-06 DIAGNOSIS — F13.20 BENZODIAZEPINE DEPENDENCE, CONTINUOUS (HCC): ICD-10-CM

## 2023-07-06 DIAGNOSIS — E11.22 TYPE 2 DM WITH HYPERTENSION AND ESRD ON DIALYSIS (HCC): ICD-10-CM

## 2023-07-06 DIAGNOSIS — Z99.2 TYPE 2 DM WITH HYPERTENSION AND ESRD ON DIALYSIS (HCC): Primary | ICD-10-CM

## 2023-07-06 DIAGNOSIS — N18.6 TYPE 2 DM WITH HYPERTENSION AND ESRD ON DIALYSIS (HCC): ICD-10-CM

## 2023-07-06 DIAGNOSIS — Z99.2 TYPE 2 DM WITH HYPERTENSION AND ESRD ON DIALYSIS (HCC): ICD-10-CM

## 2023-07-06 DIAGNOSIS — G43.109 CHRONIC MIGRAINE WITH AURA: ICD-10-CM

## 2023-07-06 DIAGNOSIS — I12.0 TYPE 2 DM WITH HYPERTENSION AND ESRD ON DIALYSIS (HCC): ICD-10-CM

## 2023-07-06 DIAGNOSIS — Z99.89 DEPENDENT ON WALKER FOR AMBULATION: ICD-10-CM

## 2023-07-06 DIAGNOSIS — Z02.89 MEDICATION MANAGEMENT CONTRACT AGREEMENT: ICD-10-CM

## 2023-07-06 DIAGNOSIS — Z13.29 SCREENING FOR ENDOCRINE, NUTRITIONAL, METABOLIC AND IMMUNITY DISORDER: ICD-10-CM

## 2023-07-06 DIAGNOSIS — E11.42 TYPE 2 DIABETES MELLITUS WITH DIABETIC POLYNEUROPATHY, WITH LONG-TERM CURRENT USE OF INSULIN (HCC): ICD-10-CM

## 2023-07-06 DIAGNOSIS — Z13.0 SCREENING FOR ENDOCRINE, NUTRITIONAL, METABOLIC AND IMMUNITY DISORDER: ICD-10-CM

## 2023-07-06 DIAGNOSIS — N18.6 TYPE 2 DM WITH HYPERTENSION AND ESRD ON DIALYSIS (HCC): Primary | ICD-10-CM

## 2023-07-06 DIAGNOSIS — F19.20 CONTROLLED DRUG DEPENDENCE (HCC): ICD-10-CM

## 2023-07-06 DIAGNOSIS — E11.69 TYPE 2 DIABETES MELLITUS WITH HYPERLIPIDEMIA (HCC): ICD-10-CM

## 2023-07-06 DIAGNOSIS — Z79.4 TYPE 2 DIABETES MELLITUS WITH DIABETIC POLYNEUROPATHY, WITH LONG-TERM CURRENT USE OF INSULIN (HCC): ICD-10-CM

## 2023-07-06 RX ORDER — HYDRALAZINE HYDROCHLORIDE 20 MG/ML
10 INJECTION INTRAMUSCULAR; INTRAVENOUS
COMMUNITY
Start: 2023-04-26 | End: 2023-07-06

## 2023-07-06 RX ORDER — SODIUM CHLORIDE 9 MG/ML
10 INJECTION, SOLUTION INTRAVENOUS
COMMUNITY
Start: 2023-04-26

## 2023-07-06 RX ORDER — ALBUTEROL SULFATE 90 UG/1
2 AEROSOL, METERED RESPIRATORY (INHALATION) EVERY 6 HOURS PRN
Qty: 18 G | Refills: 2 | Status: SHIPPED | OUTPATIENT
Start: 2023-07-06

## 2023-07-06 RX ORDER — INSULIN LISPRO 100 [IU]/ML
INJECTION, SOLUTION INTRAVENOUS; SUBCUTANEOUS
Qty: 3 ADJUSTABLE DOSE PRE-FILLED PEN SYRINGE | Refills: 5 | Status: CANCELLED | OUTPATIENT
Start: 2023-07-06

## 2023-07-06 RX ORDER — DESVENLAFAXINE 25 MG/1
25 TABLET, EXTENDED RELEASE ORAL DAILY
Qty: 90 TABLET | Refills: 1 | Status: SHIPPED | OUTPATIENT
Start: 2023-07-06

## 2023-07-06 RX ORDER — RIZATRIPTAN BENZOATE 10 MG/1
10 TABLET ORAL
Qty: 9 TABLET | Refills: 2 | Status: SHIPPED | OUTPATIENT
Start: 2023-07-06 | End: 2023-07-06

## 2023-07-06 RX ORDER — BUSPIRONE HYDROCHLORIDE 5 MG/1
5 TABLET ORAL 2 TIMES DAILY
Qty: 60 TABLET | Refills: 2 | Status: SHIPPED | OUTPATIENT
Start: 2023-07-06 | End: 2023-10-04

## 2023-07-06 RX ORDER — OLMESARTAN MEDOXOMIL 20 MG/1
TABLET ORAL
COMMUNITY
Start: 2023-06-03 | End: 2023-07-06

## 2023-07-06 RX ORDER — HEPARIN SODIUM 5000 [USP'U]/ML
5000 INJECTION, SOLUTION INTRAVENOUS; SUBCUTANEOUS
COMMUNITY
Start: 2023-04-26

## 2023-07-06 RX ORDER — AMLODIPINE BESYLATE 10 MG/1
10 TABLET ORAL DAILY
COMMUNITY

## 2023-07-06 RX ORDER — AMLODIPINE BESYLATE 5 MG/1
TABLET ORAL
COMMUNITY
Start: 2023-06-25 | End: 2023-07-06

## 2023-07-06 RX ORDER — ALPRAZOLAM 2 MG/1
TABLET, EXTENDED RELEASE ORAL
Qty: 30 TABLET | Refills: 0 | Status: SHIPPED | OUTPATIENT
Start: 2023-07-06 | End: 2023-08-05

## 2023-07-06 ASSESSMENT — ENCOUNTER SYMPTOMS
WHEEZING: 0
ABDOMINAL PAIN: 0
VOMITING: 0
BACK PAIN: 0
NAUSEA: 0
SINUS PRESSURE: 0
ABDOMINAL DISTENTION: 0
CONSTIPATION: 0
CHEST TIGHTNESS: 0
BLOOD IN STOOL: 0
RHINORRHEA: 0
COUGH: 0
DIARRHEA: 0
TROUBLE SWALLOWING: 0
SHORTNESS OF BREATH: 0

## 2023-07-07 LAB
ALBUMIN SERPL-MCNC: 4.2 G/DL (ref 3.4–5)
ALBUMIN/GLOB SERPL: 1.8 {RATIO} (ref 1.1–2.2)
ALP SERPL-CCNC: 131 U/L (ref 40–129)
ALT SERPL-CCNC: 6 U/L (ref 10–40)
ANION GAP SERPL CALCULATED.3IONS-SCNC: 14 MMOL/L (ref 3–16)
AST SERPL-CCNC: 12 U/L (ref 15–37)
BILIRUB SERPL-MCNC: 0.7 MG/DL (ref 0–1)
BUN SERPL-MCNC: 21 MG/DL (ref 7–20)
CALCIUM SERPL-MCNC: 9.7 MG/DL (ref 8.3–10.6)
CHLORIDE SERPL-SCNC: 90 MMOL/L (ref 99–110)
CHOLEST SERPL-MCNC: 114 MG/DL (ref 0–199)
CO2 SERPL-SCNC: 33 MMOL/L (ref 21–32)
CREAT SERPL-MCNC: 4.3 MG/DL (ref 0.6–1.1)
EST. AVERAGE GLUCOSE BLD GHB EST-MCNC: 128.4 MG/DL
GFR SERPLBLD CREATININE-BSD FMLA CKD-EPI: 12 ML/MIN/{1.73_M2}
GLUCOSE SERPL-MCNC: 123 MG/DL (ref 70–99)
HAV IGM SERPL QL IA: NORMAL
HBA1C MFR BLD: 6.1 %
HBV CORE IGM SERPL QL IA: NORMAL
HBV SURFACE AG SERPL QL IA: NORMAL
HCV AB SERPL QL IA: NORMAL
HDLC SERPL-MCNC: 38 MG/DL (ref 40–60)
HIV 1+2 AB+HIV1 P24 AG SERPL QL IA: NORMAL
HIV 2 AB SERPL QL IA: NORMAL
HIV1 AB SERPL QL IA: NORMAL
HIV1 P24 AG SERPL QL IA: NORMAL
LDLC SERPL CALC-MCNC: 61 MG/DL
POTASSIUM SERPL-SCNC: 4.6 MMOL/L (ref 3.5–5.1)
PROT SERPL-MCNC: 6.6 G/DL (ref 6.4–8.2)
SODIUM SERPL-SCNC: 137 MMOL/L (ref 136–145)
TRIGL SERPL-MCNC: 77 MG/DL (ref 0–150)
VLDLC SERPL CALC-MCNC: 15 MG/DL

## 2023-07-10 ENCOUNTER — TELEPHONE (OUTPATIENT)
Dept: ORTHOPEDIC SURGERY | Age: 48
End: 2023-07-10

## 2023-07-10 DIAGNOSIS — J43.9 PULMONARY EMPHYSEMA, UNSPECIFIED EMPHYSEMA TYPE (HCC): Primary | ICD-10-CM

## 2023-07-10 NOTE — TELEPHONE ENCOUNTER
Submitted PA for [employer] Respimat 2.5-2.5MCG/ACT aerosol  Via [facility] Key: [id number] STATUS: PENDING. Follow up done daily; if no response in three days we will refax for status check. If another three days goes by with no response we will call the insurance for status.

## 2023-07-10 NOTE — TELEPHONE ENCOUNTER
Submitted PA for Edarbi 80MG tablets  Via [facility] Key: [id number] STATUS: PENDING. Follow up done daily; if no response in three days we will refax for status check. If another three days goes by with no response we will call the insurance for status.

## 2023-07-11 NOTE — TELEPHONE ENCOUNTER
Received APPROVAL for Edarbi 80MG tablets from 07/06/23 until further notice; approval letter attached. If this requires a response please respond to the pool ( [facility] [address]). Thank you please advise patient.

## 2023-07-11 NOTE — TELEPHONE ENCOUNTER
Received DENIAL for Stiolto Respimat 2.5-2.5MCG/ACT aerosol; denial letter attached. If this requires a response please respond to the pool ( [facility] [address]). Thank you please advise patient.

## 2023-07-12 RX ORDER — UMECLIDINIUM BROMIDE AND VILANTEROL TRIFENATATE 62.5; 25 UG/1; UG/1
1 POWDER RESPIRATORY (INHALATION) DAILY
Qty: 60 EACH | Refills: 5 | Status: SHIPPED | OUTPATIENT
Start: 2023-07-12

## 2023-07-12 NOTE — TELEPHONE ENCOUNTER
Please inform pt that Perla Leeann was approved for her high BP. This medication will replace her Olmesartan. Regarding inhalers, please inform pt that Stiolto is not covered. Per her insurance, Anoro should be covered, which is something similar to [employer]. I will switch her to Anoro. Jain P.  [address]

## 2023-07-13 DIAGNOSIS — Z02.89 MEDICATION MANAGEMENT CONTRACT AGREEMENT: ICD-10-CM

## 2023-07-13 DIAGNOSIS — F13.20 BENZODIAZEPINE DEPENDENCE, CONTINUOUS (HCC): ICD-10-CM

## 2023-07-13 DIAGNOSIS — F41.0 GENERALIZED ANXIETY DISORDER WITH PANIC ATTACKS: ICD-10-CM

## 2023-07-13 DIAGNOSIS — F41.1 GENERALIZED ANXIETY DISORDER WITH PANIC ATTACKS: ICD-10-CM

## 2023-07-13 DIAGNOSIS — F19.20 CONTROLLED DRUG DEPENDENCE (HCC): ICD-10-CM

## 2023-07-13 RX ORDER — ALPRAZOLAM 2 MG/1
TABLET, EXTENDED RELEASE ORAL
Qty: 30 TABLET | Refills: 0 | Status: SHIPPED | OUTPATIENT
Start: 2023-07-13 | End: 2023-08-12

## 2023-07-15 DIAGNOSIS — F41.0 GENERALIZED ANXIETY DISORDER WITH PANIC ATTACKS: ICD-10-CM

## 2023-07-15 DIAGNOSIS — F19.20 CONTROLLED DRUG DEPENDENCE (HCC): ICD-10-CM

## 2023-07-15 DIAGNOSIS — F41.1 GENERALIZED ANXIETY DISORDER WITH PANIC ATTACKS: ICD-10-CM

## 2023-07-15 DIAGNOSIS — F13.20 BENZODIAZEPINE DEPENDENCE, CONTINUOUS (HCC): ICD-10-CM

## 2023-07-15 DIAGNOSIS — Z02.89 MEDICATION MANAGEMENT CONTRACT AGREEMENT: ICD-10-CM

## 2023-07-17 RX ORDER — ALPRAZOLAM 2 MG/1
TABLET, EXTENDED RELEASE ORAL
Qty: 30 TABLET | OUTPATIENT
Start: 2023-07-17

## 2023-07-28 DIAGNOSIS — N18.6 TYPE 2 DM WITH HYPERTENSION AND ESRD ON DIALYSIS (HCC): ICD-10-CM

## 2023-07-28 DIAGNOSIS — I12.0 TYPE 2 DM WITH HYPERTENSION AND ESRD ON DIALYSIS (HCC): ICD-10-CM

## 2023-07-28 DIAGNOSIS — Z99.2 TYPE 2 DM WITH HYPERTENSION AND ESRD ON DIALYSIS (HCC): ICD-10-CM

## 2023-07-28 DIAGNOSIS — E11.22 TYPE 2 DM WITH HYPERTENSION AND ESRD ON DIALYSIS (HCC): ICD-10-CM

## 2023-07-28 RX ORDER — CLONIDINE HYDROCHLORIDE 0.1 MG/1
TABLET ORAL
Qty: 180 TABLET | Refills: 0 | OUTPATIENT
Start: 2023-07-28

## 2023-08-14 DIAGNOSIS — F41.0 GENERALIZED ANXIETY DISORDER WITH PANIC ATTACKS: ICD-10-CM

## 2023-08-14 DIAGNOSIS — Z02.89 MEDICATION MANAGEMENT CONTRACT AGREEMENT: ICD-10-CM

## 2023-08-14 DIAGNOSIS — F41.1 GENERALIZED ANXIETY DISORDER WITH PANIC ATTACKS: ICD-10-CM

## 2023-08-14 DIAGNOSIS — F13.20 BENZODIAZEPINE DEPENDENCE, CONTINUOUS (HCC): ICD-10-CM

## 2023-08-14 DIAGNOSIS — F19.20 CONTROLLED DRUG DEPENDENCE (HCC): ICD-10-CM

## 2023-08-14 RX ORDER — ALPRAZOLAM 2 MG/1
TABLET, EXTENDED RELEASE ORAL
Qty: 30 TABLET | Refills: 0 | Status: SHIPPED | OUTPATIENT
Start: 2023-08-14 | End: 2023-09-13

## 2023-08-14 NOTE — TELEPHONE ENCOUNTER
PDMP monitoring:  -PDMP (prescription drug monitoring report) was obtained and reviewed for the past one year and no indicators of drug misuse were found.   -Any other controlled substance prescriptions seen on the record have been accounted for. I am aware of the patient receiving these medications. -PDMP report will be re-checked prior (or during) office visit and prior to medication refill request.  -Last PDMP report reviewed on:   Last PDMP [facility] as Reviewed [facility]):  Review User Review Instant Review Result   Doylene Aschoff 8/14/2023  7:34 PM Reviewed PDMP [1]     Rx sent. [address]

## 2023-08-14 NOTE — TELEPHONE ENCOUNTER
Pt requesting refill:    ALPRAZolam (ALPRAZOLAM XR) 2 MG extended release tablet 30 tablet 0 7/13/2023 8/12/2023    Sig: TAKE ONE TABLET BY MOUTH EVERY MORNING FOR 30 DAYS      Last ordered:  7/13/23    LOV:  7/6/23  NOV:  8/31/23    [address] [place] [address] Avenue  P:  [phone]  F:  [phone]

## 2023-08-20 DIAGNOSIS — E11.42 TYPE 2 DIABETES MELLITUS WITH DIABETIC POLYNEUROPATHY, WITH LONG-TERM CURRENT USE OF INSULIN (HCC): ICD-10-CM

## 2023-08-20 DIAGNOSIS — Z79.4 TYPE 2 DIABETES MELLITUS WITH DIABETIC POLYNEUROPATHY, WITH LONG-TERM CURRENT USE OF INSULIN (HCC): ICD-10-CM

## 2023-08-20 DIAGNOSIS — E11.69 TYPE 2 DIABETES MELLITUS WITH OBESITY (HCC): ICD-10-CM

## 2023-08-20 DIAGNOSIS — E66.9 TYPE 2 DIABETES MELLITUS WITH OBESITY (HCC): ICD-10-CM

## 2023-08-21 RX ORDER — DULAGLUTIDE 3 MG/.5ML
INJECTION, SOLUTION SUBCUTANEOUS
Qty: 2 ML | OUTPATIENT
Start: 2023-08-21

## 2023-08-24 DIAGNOSIS — E66.9 TYPE 2 DIABETES MELLITUS WITH OBESITY (HCC): ICD-10-CM

## 2023-08-24 DIAGNOSIS — E11.69 TYPE 2 DIABETES MELLITUS WITH OBESITY (HCC): ICD-10-CM

## 2023-08-24 DIAGNOSIS — E11.42 TYPE 2 DIABETES MELLITUS WITH DIABETIC POLYNEUROPATHY, WITH LONG-TERM CURRENT USE OF INSULIN (HCC): ICD-10-CM

## 2023-08-24 DIAGNOSIS — Z79.4 TYPE 2 DIABETES MELLITUS WITH DIABETIC POLYNEUROPATHY, WITH LONG-TERM CURRENT USE OF INSULIN (HCC): ICD-10-CM

## 2023-08-24 RX ORDER — DULAGLUTIDE 3 MG/.5ML
3 INJECTION, SOLUTION SUBCUTANEOUS WEEKLY
Qty: 4 ADJUSTABLE DOSE PRE-FILLED PEN SYRINGE | Refills: 0 | Status: SHIPPED | OUTPATIENT
Start: 2023-08-24

## 2023-08-24 NOTE — TELEPHONE ENCOUNTER
----- Message from Festus Parish sent at 8/24/2023  1:26 PM EDT -----  Subject: Refill Request    QUESTIONS  Name of Medication? Dulaglutide (TRULICITY) 3 WX/4.8XB SOPN  Patient-reported dosage and instructions? 3mg 1 Weekly   How many days do you have left? 0  Preferred Pharmacy? [facility] [id number]  Pharmacy phone number (if available)? [phone]  Additional Information for Provider? Patient is due to take this on 8/25.   ---------------------------------------------------------------------------  --------------  CALL BACK INFO  What is the best way for the office to contact you? OK to leave message on   voicemail  Preferred Call Back Phone Number? [phone]  ---------------------------------------------------------------------------  --------------  SCRIPT ANSWERS  Relationship to Patient?  Self

## 2023-08-27 NOTE — PROGRESS NOTES
or with adjusting the dose of any given medication. I have discussed with patient (at some point) and made aware that some patients may benefit from more than one medication compared to monotherapy. A combination of both medical and behavioral therapy may yield better/more effective results than either therapy alone. Lastly, it's worth mentioning that the medication(s) prescribed will not completely resolve feelings of anxiety/depression as well as make one immune or completely prevent from having more issues with anxiety and/or depression. Stress (emotional & physical) is part of everyday life. In addition to taking medication(s) for treatment of mental health issues, lifestyle measures (well balanced diet and exercise) and/or speaking to a therapist or psychologist could be very beneficial.    Orders:  -     busPIRone (BUSPAR) 10 MG tablet; Take 1 tablet by mouth 2 times daily    Controlled drug dependence ([address])    Coagulation defect ([address])    Medication management contract agreement - 5/18/2023      I reviewed the plan of care with the patient. Patient acknowledged understanding and agreed with plan of care overall.     Medications Discontinued During This Encounter   Medication Reason    olmesartan (BENICAR) 40 MG tablet Alternate therapy    insulin lispro, 1 Unit Dial, (HUMALOG/ADMELOG) 100 UNIT/ML SOPN REORDER    pregabalin (LYRICA) 75 MG capsule REORDER    insulin glargine (LANTUS;BASAGLAR) 100 UNIT/ML injection pen REORDER    Azilsartan Medoxomil 80 MG TABS REORDER    Dulaglutide (TRULICITY) 3 IF/6.1BZ SOPN DOSE ADJUSTMENT    cloNIDine (CATAPRES) 0.1 MG tablet DOSE ADJUSTMENT    busPIRone (BUSPAR) 5 MG tablet DOSE ADJUSTMENT        General information on medications:  -When it comes to medications, whether with starting or adding a new medication or increasing the dose of a current medication, the benefits and risks have to always be considered and weighed over, especially if one is taking other

## 2023-08-31 ENCOUNTER — OFFICE VISIT (OUTPATIENT)
Dept: FAMILY MEDICINE CLINIC | Age: 48
End: 2023-08-31

## 2023-08-31 VITALS
HEART RATE: 102 BPM | OXYGEN SATURATION: 100 % | DIASTOLIC BLOOD PRESSURE: 96 MMHG | WEIGHT: 198.4 LBS | SYSTOLIC BLOOD PRESSURE: 142 MMHG | TEMPERATURE: 96.7 F | BODY MASS INDEX: 31.07 KG/M2

## 2023-08-31 DIAGNOSIS — Z99.2 TYPE 2 DM WITH HYPERTENSION AND ESRD ON DIALYSIS (HCC): Primary | ICD-10-CM

## 2023-08-31 DIAGNOSIS — J43.9 PULMONARY EMPHYSEMA, UNSPECIFIED EMPHYSEMA TYPE (HCC): ICD-10-CM

## 2023-08-31 DIAGNOSIS — I10 TACHYCARDIA WITH HYPERTENSION: ICD-10-CM

## 2023-08-31 DIAGNOSIS — N18.6 TYPE 2 DM WITH HYPERTENSION AND ESRD ON DIALYSIS (HCC): Primary | ICD-10-CM

## 2023-08-31 DIAGNOSIS — F41.0 GENERALIZED ANXIETY DISORDER WITH PANIC ATTACKS: ICD-10-CM

## 2023-08-31 DIAGNOSIS — E11.42 TYPE 2 DIABETES MELLITUS WITH DIABETIC POLYNEUROPATHY, WITH LONG-TERM CURRENT USE OF INSULIN (HCC): ICD-10-CM

## 2023-08-31 DIAGNOSIS — Z79.4 TYPE 2 DIABETES MELLITUS WITH DIABETIC POLYNEUROPATHY, WITH LONG-TERM CURRENT USE OF INSULIN (HCC): ICD-10-CM

## 2023-08-31 DIAGNOSIS — D68.9 COAGULATION DEFECT (HCC): ICD-10-CM

## 2023-08-31 DIAGNOSIS — Z02.89 MEDICATION MANAGEMENT CONTRACT AGREEMENT: ICD-10-CM

## 2023-08-31 DIAGNOSIS — E11.22 TYPE 2 DM WITH HYPERTENSION AND ESRD ON DIALYSIS (HCC): Primary | ICD-10-CM

## 2023-08-31 DIAGNOSIS — E66.9 TYPE 2 DIABETES MELLITUS WITH OBESITY (HCC): ICD-10-CM

## 2023-08-31 DIAGNOSIS — F41.1 GENERALIZED ANXIETY DISORDER WITH PANIC ATTACKS: ICD-10-CM

## 2023-08-31 DIAGNOSIS — F19.20 CONTROLLED DRUG DEPENDENCE (HCC): ICD-10-CM

## 2023-08-31 DIAGNOSIS — E11.69 TYPE 2 DIABETES MELLITUS WITH OBESITY (HCC): ICD-10-CM

## 2023-08-31 DIAGNOSIS — I12.0 TYPE 2 DM WITH HYPERTENSION AND ESRD ON DIALYSIS (HCC): Primary | ICD-10-CM

## 2023-08-31 DIAGNOSIS — R00.0 TACHYCARDIA WITH HYPERTENSION: ICD-10-CM

## 2023-08-31 PROBLEM — J44.9 CHRONIC OBSTRUCTIVE PULMONARY DISEASE, UNSPECIFIED (HCC): Status: ACTIVE | Noted: 2023-08-31

## 2023-08-31 RX ORDER — BUSPIRONE HYDROCHLORIDE 10 MG/1
10 TABLET ORAL 2 TIMES DAILY
Qty: 60 TABLET | Refills: 3 | Status: SHIPPED | OUTPATIENT
Start: 2023-08-31 | End: 2023-12-29

## 2023-08-31 RX ORDER — PREGABALIN 75 MG/1
75 CAPSULE ORAL DAILY
Qty: 270 CAPSULE | Refills: 0 | Status: SHIPPED | OUTPATIENT
Start: 2023-08-31 | End: 2024-05-27

## 2023-08-31 RX ORDER — DULAGLUTIDE 4.5 MG/.5ML
4.5 INJECTION, SOLUTION SUBCUTANEOUS WEEKLY
Qty: 4 ADJUSTABLE DOSE PRE-FILLED PEN SYRINGE | Refills: 2 | Status: SHIPPED | OUTPATIENT
Start: 2023-08-31

## 2023-08-31 RX ORDER — INSULIN LISPRO 100 [IU]/ML
INJECTION, SOLUTION INTRAVENOUS; SUBCUTANEOUS
Qty: 3 ADJUSTABLE DOSE PRE-FILLED PEN SYRINGE | Refills: 5 | Status: SHIPPED | OUTPATIENT
Start: 2023-08-31

## 2023-08-31 RX ORDER — CLONIDINE HYDROCHLORIDE 0.2 MG/1
0.2 TABLET ORAL 3 TIMES DAILY
Qty: 270 TABLET | Refills: 1 | Status: SHIPPED | OUTPATIENT
Start: 2023-08-31

## 2023-08-31 ASSESSMENT — ENCOUNTER SYMPTOMS
SHORTNESS OF BREATH: 0
ABDOMINAL PAIN: 0
VOMITING: 0
BLOOD IN STOOL: 0
NAUSEA: 0
CONSTIPATION: 0
BACK PAIN: 0
RHINORRHEA: 0
COUGH: 0
SINUS PRESSURE: 0
CHEST TIGHTNESS: 0
WHEEZING: 0
ABDOMINAL DISTENTION: 0
TROUBLE SWALLOWING: 0
DIARRHEA: 0

## 2023-09-04 NOTE — PROGRESS NOTES
Facility/Department: [address] NURSING  Initial Assessment  DYSPHAGIA BEDSIDE SWALLOW EVALUATION     Patient: Kelsie Thompson   : 1975   MRN: [id number]      Evaluation Date: 2022   Admitting Diagnosis: Altered mental status [R41.82]  Generalized weakness [R53.1]  Overuse of medication [Z91.14]  Chronic prescription benzodiazepine use [Z79.899]  Hypoglycemia due to type 2 diabetes mellitus ([address] [E11.649]  Stage 5 chronic kidney disease on chronic dialysis ([address] [N18.6, Z99.2]  Pain: None reported                         H&P: 2022  \"Kristine Schwarz is a 55 y.o. female who has a past medical history of Blind in both eyes, Cerebral artery occlusion with cerebral infarction ([address] CHF (congestive heart failure) ([address] Chronic kidney disease, Depression, Diabetes mellitus out of control ([address] Diabetes mellitus, type II ([address] Diabetic neuropathy associated with type 2 diabetes mellitus ([address] Generalized headaches, Hypertension, Infertility, Insomnia, Migraine headache, Mixed hyperlipidemia, Otitis media, Pelvic abscess in female, Pneumonia, Stroke ([address] and Stroke ([address] \"    Chest xray/Chest CT: None this admission    Head CT: 2022  Impression   1. Age related involutional changes with no acute intracranial abnormality. 2. No flow-limiting arterial stenosis in the head and neck. Modified Barium Swallow Study: None per chart review    History/Prior Level of Function:   Living Status: Private residence with spouse  Prior Dysphagia History: Pt known to speech department from previous admissions at this facility: 2020, 2021, 2022. Most recently, pt seen in ARU for cognitive-linguistic deficits, on a regular textured diet during stay. PCA reported pt did well at breakfast this AM.     Dysphagia Impressions/Diagnosis: Oropharyngeal Dysphagia   Pt alert but lethargic, verbal responses were delayed and pt benefited from intermittent verbal cues to maintain AYDEN during evaluation.  Pt not fully oriented to location [facility]) or date (January). Positioned upright in bed with HOB elevated, on RA with RR <20/min. Oral motor exam revealed reduced lingual, labial, and buccal ROM and coordination. Adequate dentition for mastication, gross facial symmetry. Laryngeal function assessment revealed present volitional swallow, weak volitional cough, and weak vocal quality at baseline. Pt assessed with PO presentations, fed by SLP: ice chips, thin liquids (spoon, straw), puree, soft and bite-sized, and solids. Oral phase characterized by functional oral acceptance and labial seal but prolonged oral manipulation and A-P transit. With liquids, clinical s/s delayed swallow initiation with suspected pharyngeal pooling. Extensive mastication of soft and regular solids observed, although no significant oral residue noted post swallow. Pt with cough x 1 following initial thin liquid straw sip, but no additional overt clinical s/s aspiration across PO intake. Pt with clinical s/s oropharyngeal dysphagia at bedside. Contributing risk factors include deficits in alertness and cognition. Pt with increased risk for silent aspiration given potential sensory deficits related to hx of CVA. Therefore, recommend dysphagia diet of Minced and Moist with thin liquids and ongoing intervention for diet tolerance. Recommend aspiration precautions and feeding assistance with intake only when pt fully awake and alert. For overt clinical s/s aspiration, recommend holding PO pending re-evaluation. Recommended Diet and Intervention 2/21/2022:  Diet Solids Recommendation:  Dysphagia II Minced and Moist Liquid Consistency Recommendation: Thin liquids Recommended form of Meds: In puree     Compensatory Swallowing Strategies:   Alternate solids/liquids , Check for pocketing of food L, Check for pocketing of food R, Upright as possible with all PO intake , Assist Feed , External Pacing , Small bites/sips , Eat/feed slowly, Remain 4 = No assist / stand by assistance upright 30-45 min , Other:     SHORT TERM DYSPHAGIA GOALS/PLAN OF CARE: Speech therapy for dysphagia tx 3-5 times per week during acute care stay.     Pt will functionally tolerate recommended diet with no overt clinical s/s of aspiration  Pt will demonstrate understanding of aspiration risk and precautions via education/demonstration with occasional prompting  Pt will advance to least restrictive diet as indicated   If clinical s/s of aspiration/penetration continue to be noted, Pt will participate in Modified Barium Swallow Study     Dysphagia Therapeutic Intervention:  Oral Care, Diet Tolerance Monitoring , Patient/Family Education , Therapeutic Trials with SLP     Discharge Recommendations: Discharge recommendations to be determined pending ongoing follow-up during acute care stay    Patient Positioning: Upright in bed    Current Diet Level (prior to evaluation): Regular texture diet  Thin liquids      Respiratory Status:   [x]Room Air   []O2 via nasal cannula   []Other:    Dentition:  [x]Adequate  []Dentures   []Missing Many Teeth  []Edentulous  []Other:     Baseline Vocal Quality:  []Normal  []Dysphonic   []Aphonic   []Hoarse  []Wet  [x]Weak  []Other:    Volitional Cough:  []Strong  [x]Weak  []Wet  []Absent  []Congested  []Other:    Volitional Swallow:   []Absent   []Delayed     []Adequate     [x]Required use of drink     Oral Mechanism Exam:  []WFL [x]Mild   [] Moderate  []Severe  []To be assessed  Impaired:   []Left side      []Right side    [x]Labial ROM/Coordination    []Labial Symmetry   [x]Lingual ROM/Coordination   []Lingual Symmetry  []Gag  []Other:     Oral Phase: []WFL [x]Mild   [] Moderate  []Severe  []To be assessed   [x]Impaired/Prolonged Mastication:   []Spillage Left:   []Spillage Right:  []Pocketing Left:   []Pocketing Right:   []Decreased Anterior to Posterior Transit:   [x]Suspected Premature Bolus Loss:   []Lingual/Palatal Residue:   []Other:     Pharyngeal Phase: []WFL [x]Mild   [x] Moderate []Severe  []To be assessed   [x]Suspected Delayed Swallow:   [x]Suspected Pharyngeal Pooling:   [x]Decreased Laryngeal Elevation:   []Absent Swallow:  []Wet Vocal Quality:   []Throat Clearing-Immediate:   []Throat Clearing-Delayed:   [x]Cough-Immediate: x 1   []Cough-Delayed:  []Change in Vital Signs:  [x]Suspected Delayed Pharyngeal Clearing:  []Other:       Eating Assistance:  []Independent  []Setup or clean-up assistance   [] Supervision or touching assistance   [] Partial or moderate assistance   [x] Substantial or maximal assistance  [] Dependent       EDUCATION:   Provided education regarding role of SLP, results of assessment, recommendations and general speech pathology plan of care. [x] Pt verbalized understanding and agreement   [x] Pt requires ongoing learning   [] No evidence of comprehension     If patient discharges prior to next visit, this note will serve as discharge.      Timed Code Minutes: 0  Total Treatment Minutes: 26    Electronically signed by:    Bruno Vides, [address] [facility],[address] Floor Pathologist  [phone]

## 2023-09-13 ENCOUNTER — TELEPHONE (OUTPATIENT)
Dept: FAMILY MEDICINE CLINIC | Age: 48
End: 2023-09-13

## 2023-09-13 DIAGNOSIS — F41.1 GENERALIZED ANXIETY DISORDER WITH PANIC ATTACKS: ICD-10-CM

## 2023-09-13 DIAGNOSIS — F19.20 CONTROLLED DRUG DEPENDENCE (HCC): ICD-10-CM

## 2023-09-13 DIAGNOSIS — F41.0 GENERALIZED ANXIETY DISORDER WITH PANIC ATTACKS: ICD-10-CM

## 2023-09-13 DIAGNOSIS — Z02.89 MEDICATION MANAGEMENT CONTRACT AGREEMENT: ICD-10-CM

## 2023-09-13 DIAGNOSIS — F13.20 BENZODIAZEPINE DEPENDENCE, CONTINUOUS (HCC): ICD-10-CM

## 2023-09-13 RX ORDER — ALPRAZOLAM 2 MG/1
2 TABLET, EXTENDED RELEASE ORAL EVERY MORNING
Qty: 90 TABLET | Refills: 0 | Status: SHIPPED | OUTPATIENT
Start: 2023-09-13 | End: 2023-12-12

## 2023-09-13 NOTE — TELEPHONE ENCOUNTER
PDMP monitoring:  -PDMP (prescription drug monitoring report) was obtained and reviewed for the past one year and no indicators of drug misuse were found.   -Any other controlled substance prescriptions seen on the record have been accounted for. I am aware of the patient receiving these medications. -PDMP report will be re-checked prior (or during) office visit and prior to medication refill request.  -Last PDMP report reviewed on:   Last PDMP Aden Davis as Reviewed [facility]):  Review User Review Instant Review Result   Valentina Jensen, [id number] [facility] 9/13/2023  3:13 PM Reviewed PDMP [1]     Rx sent. [facility] [place]  [facility]

## 2023-09-13 NOTE — TELEPHONE ENCOUNTER
----- Message from Sebastian Zamudio sent at 9/13/2023 10:59 AM EDT -----  Subject: Refill Request    QUESTIONS  Name of Medication? ALPRAZolam (ALPRAZOLAM XR) 2 MG extended release   tablet  Patient-reported dosage and instructions? 1 TAB A DAY  How many days do you have left? 0  Preferred Pharmacy? [facility] [id number]  Pharmacy phone number (if available)? [phone]  Additional Information for Provider? PT NEEDS A REFILL ASAP IS ALL OUT   ---------------------------------------------------------------------------  --------------  CALL BACK INFO  What is the best way for the office to contact you? Do not leave any   message, patient will call back for answer  Preferred Call Back Phone Number? [phone]  ---------------------------------------------------------------------------  --------------  SCRIPT ANSWERS  Relationship to Patient?  Self

## 2023-09-18 DIAGNOSIS — E11.69 TYPE 2 DIABETES MELLITUS WITH OBESITY (HCC): ICD-10-CM

## 2023-09-18 DIAGNOSIS — Z79.4 TYPE 2 DIABETES MELLITUS WITH DIABETIC POLYNEUROPATHY, WITH LONG-TERM CURRENT USE OF INSULIN (HCC): ICD-10-CM

## 2023-09-18 DIAGNOSIS — E11.42 TYPE 2 DIABETES MELLITUS WITH DIABETIC POLYNEUROPATHY, WITH LONG-TERM CURRENT USE OF INSULIN (HCC): ICD-10-CM

## 2023-09-18 DIAGNOSIS — E66.9 TYPE 2 DIABETES MELLITUS WITH OBESITY (HCC): ICD-10-CM

## 2023-09-18 RX ORDER — DULAGLUTIDE 3 MG/.5ML
INJECTION, SOLUTION SUBCUTANEOUS
Qty: 2 ML | OUTPATIENT
Start: 2023-09-18

## 2023-10-29 NOTE — PROGRESS NOTES
Drew Calderon   50 y.o. female   1975    HPI:    Patient was last seen by me on 8/31/2023. During our last office visit, the main issue(s) that we focused on were:     Type 2 DM with hypertension and ESRD on dialysis [facility])  Comments:  BP improved, but still not under control. BP goal <130/80. Will switch from Olmesartan to Azilsartan. I provided pt a hard copy of Azilsartan [facility]). Continue Amlodipine daily. Pt has tolerated the increase from Clonidine 0.1 mg TID to 0.2 mg TID. Will send in rx of this. Orders:  -     Azilsartan Medoxomil 80 MG TABS; Take 80 mg by mouth daily  -     Dulaglutide (TRULICITY) 4.5 RX/2.7WR SOPN; Inject 4.5 mg into the skin once a week  -     cloNIDine (CATAPRES) 0.2 MG tablet; Take 1 tablet by mouth in the morning, at noon, and at bedtime  Generalized anxiety disorder with panic attacks  Comments:  PDMP reviewed. Will increase Buspirone from 5 mg BID to 10 mg BID. Continue Desvenlafaxine XR QD and Alprazolam CR QD PRN. Reason(s) for visit:   Chief Complaint   Patient presents with    Medication Refill    Follow-up    Diabetes     -Interval history-    [] No new significant health event(s)/problem(s) occurred since our last office visit. [] No new health issues/concerns discussed during today's office visit. [x] New health issue(s)/concern(s):    Patient reported that ever since her Trulicity was increased from 3 to 4.5 mg once weekly, the past 5 out of 6 weeks, she has had weakness, malaise, and in particular, nausea and vomiting. She has had increased heartburn. She has also seen higher BP readings - 's during this time period. [] Other noteworthy comment(s):     -Chronic health issue(s)-    Anxiety/depression:  -Meds tried: Sertraline, Fluoxetine, Fluvoxamine, Xanax, Bupropion, Alprazolam, Viibryd. -GeneSight test done in Dec 2021.      Updates:  -No recent stressors of note other than stress/anxiety over her health issues given that she has ESRD on

## 2023-11-02 ENCOUNTER — OFFICE VISIT (OUTPATIENT)
Dept: FAMILY MEDICINE CLINIC | Age: 48
End: 2023-11-02

## 2023-11-02 VITALS
BODY MASS INDEX: 27.86 KG/M2 | DIASTOLIC BLOOD PRESSURE: 82 MMHG | HEART RATE: 87 BPM | SYSTOLIC BLOOD PRESSURE: 132 MMHG | OXYGEN SATURATION: 95 % | WEIGHT: 177.91 LBS

## 2023-11-02 DIAGNOSIS — E11.42 TYPE 2 DIABETES MELLITUS WITH DIABETIC POLYNEUROPATHY, WITH LONG-TERM CURRENT USE OF INSULIN (HCC): ICD-10-CM

## 2023-11-02 DIAGNOSIS — Z99.2 TYPE 2 DM WITH HYPERTENSION AND ESRD ON DIALYSIS (HCC): ICD-10-CM

## 2023-11-02 DIAGNOSIS — F13.20 BENZODIAZEPINE DEPENDENCE, CONTINUOUS (HCC): ICD-10-CM

## 2023-11-02 DIAGNOSIS — N18.6 TYPE 2 DM WITH HYPERTENSION AND ESRD ON DIALYSIS (HCC): ICD-10-CM

## 2023-11-02 DIAGNOSIS — E66.9 TYPE 2 DIABETES MELLITUS WITH OBESITY (HCC): ICD-10-CM

## 2023-11-02 DIAGNOSIS — K31.84 GASTROPARESIS: Primary | ICD-10-CM

## 2023-11-02 DIAGNOSIS — F41.0 GENERALIZED ANXIETY DISORDER WITH PANIC ATTACKS: ICD-10-CM

## 2023-11-02 DIAGNOSIS — E11.22 TYPE 2 DM WITH HYPERTENSION AND ESRD ON DIALYSIS (HCC): ICD-10-CM

## 2023-11-02 DIAGNOSIS — I12.0 TYPE 2 DM WITH HYPERTENSION AND ESRD ON DIALYSIS (HCC): ICD-10-CM

## 2023-11-02 DIAGNOSIS — Z99.89 DEPENDENT ON WALKER FOR AMBULATION: ICD-10-CM

## 2023-11-02 DIAGNOSIS — Z02.89 MEDICATION MANAGEMENT CONTRACT AGREEMENT: ICD-10-CM

## 2023-11-02 DIAGNOSIS — Z79.4 TYPE 2 DIABETES MELLITUS WITH DIABETIC POLYNEUROPATHY, WITH LONG-TERM CURRENT USE OF INSULIN (HCC): ICD-10-CM

## 2023-11-02 DIAGNOSIS — J43.9 PULMONARY EMPHYSEMA, UNSPECIFIED EMPHYSEMA TYPE (HCC): ICD-10-CM

## 2023-11-02 DIAGNOSIS — F41.1 GENERALIZED ANXIETY DISORDER WITH PANIC ATTACKS: ICD-10-CM

## 2023-11-02 DIAGNOSIS — E11.69 TYPE 2 DIABETES MELLITUS WITH OBESITY (HCC): ICD-10-CM

## 2023-11-02 RX ORDER — AMLODIPINE BESYLATE 5 MG/1
TABLET ORAL
COMMUNITY
Start: 2023-10-28

## 2023-11-02 RX ORDER — DULAGLUTIDE 3 MG/.5ML
3 INJECTION, SOLUTION SUBCUTANEOUS WEEKLY
Qty: 4 ADJUSTABLE DOSE PRE-FILLED PEN SYRINGE | Refills: 5 | Status: SHIPPED | OUTPATIENT
Start: 2023-11-02

## 2023-11-02 ASSESSMENT — COLUMBIA-SUICIDE SEVERITY RATING SCALE - C-SSRS
7. DID THIS OCCUR IN THE LAST THREE MONTHS: NO
5. HAVE YOU STARTED TO WORK OUT OR WORKED OUT THE DETAILS OF HOW TO KILL YOURSELF? DO YOU INTEND TO CARRY OUT THIS PLAN?: NO
4. HAVE YOU HAD THESE THOUGHTS AND HAD SOME INTENTION OF ACTING ON THEM?: NO
3. HAVE YOU BEEN THINKING ABOUT HOW YOU MIGHT KILL YOURSELF?: NO

## 2023-11-02 ASSESSMENT — ENCOUNTER SYMPTOMS
CONSTIPATION: 0
SHORTNESS OF BREATH: 0
CHEST TIGHTNESS: 0
BLOOD IN STOOL: 0
NAUSEA: 1
DIARRHEA: 0
WHEEZING: 0
ABDOMINAL PAIN: 1
TROUBLE SWALLOWING: 0
BACK PAIN: 0
ABDOMINAL DISTENTION: 0
COUGH: 0
SINUS PRESSURE: 0
VOMITING: 1
RHINORRHEA: 0

## 2023-11-02 ASSESSMENT — PATIENT HEALTH QUESTIONNAIRE - PHQ9
7. TROUBLE CONCENTRATING ON THINGS, SUCH AS READING THE NEWSPAPER OR WATCHING TELEVISION: 0
SUM OF ALL RESPONSES TO PHQ QUESTIONS 1-9: 0
6. FEELING BAD ABOUT YOURSELF - OR THAT YOU ARE A FAILURE OR HAVE LET YOURSELF OR YOUR FAMILY DOWN: 0
SUM OF ALL RESPONSES TO PHQ QUESTIONS 1-9: 0
SUM OF ALL RESPONSES TO PHQ QUESTIONS 1-9: 0
5. POOR APPETITE OR OVEREATING: 0
9. THOUGHTS THAT YOU WOULD BE BETTER OFF DEAD, OR OF HURTING YOURSELF: 0
2. FEELING DOWN, DEPRESSED OR HOPELESS: 0
4. FEELING TIRED OR HAVING LITTLE ENERGY: 0
8. MOVING OR SPEAKING SO SLOWLY THAT OTHER PEOPLE COULD HAVE NOTICED. OR THE OPPOSITE, BEING SO FIGETY OR RESTLESS THAT YOU HAVE BEEN MOVING AROUND A LOT MORE THAN USUAL: 0
1. LITTLE INTEREST OR PLEASURE IN DOING THINGS: 0
SUM OF ALL RESPONSES TO PHQ QUESTIONS 1-9: 0
10. IF YOU CHECKED OFF ANY PROBLEMS, HOW DIFFICULT HAVE THESE PROBLEMS MADE IT FOR YOU TO DO YOUR WORK, TAKE CARE OF THINGS AT HOME, OR GET ALONG WITH OTHER PEOPLE: 0
3. TROUBLE FALLING OR STAYING ASLEEP: 0
SUM OF ALL RESPONSES TO PHQ9 QUESTIONS 1 & 2: 0

## 2023-11-17 NOTE — ED NOTES
Presents for SOB with audbile wheezes for over one week. Reports was in the ICU for unknown reason & was placed on vent in August 2021; seen on 10/4 & needed blood transfusion. Denies pulmonary hx or exposure to recent illness; quit smoking 8 weeks ago & has anxiety.       Damari Rodarte RN  10/16/21 [id number] PROCEDURES:  Robot-assisted repair of inguinal hernia using mesh 17-Nov-2023 16:58:13  [facility]

## 2023-12-05 RX ORDER — DULAGLUTIDE 4.5 MG/.5ML
INJECTION, SOLUTION SUBCUTANEOUS
Qty: 2 ML | OUTPATIENT
Start: 2023-12-05

## 2023-12-13 DIAGNOSIS — F19.20 CONTROLLED DRUG DEPENDENCE (HCC): ICD-10-CM

## 2023-12-13 DIAGNOSIS — F41.0 GENERALIZED ANXIETY DISORDER WITH PANIC ATTACKS: ICD-10-CM

## 2023-12-13 DIAGNOSIS — F13.20 BENZODIAZEPINE DEPENDENCE, CONTINUOUS (HCC): ICD-10-CM

## 2023-12-13 DIAGNOSIS — F41.1 GENERALIZED ANXIETY DISORDER WITH PANIC ATTACKS: ICD-10-CM

## 2023-12-13 DIAGNOSIS — Z02.89 MEDICATION MANAGEMENT CONTRACT AGREEMENT: ICD-10-CM

## 2023-12-13 RX ORDER — ALPRAZOLAM 2 MG/1
2 TABLET, EXTENDED RELEASE ORAL EVERY MORNING
Qty: 90 TABLET | Refills: 0 | Status: SHIPPED | OUTPATIENT
Start: 2023-12-13 | End: 2024-03-12

## 2023-12-13 NOTE — TELEPHONE ENCOUNTER
----- Message from Cecy Smith sent at 12/13/2023  2:43 PM EST -----  Subject: Refill Request    QUESTIONS  Name of Medication? ALPRAZolam (ALPRAZOLAM XR) 2 MG extended release   tablet  Patient-reported dosage and instructions? one tablet daily   How many days do you have left? 0  Preferred Pharmacy? [facility] [id number]  Pharmacy phone number (if available)? [phone]  ---------------------------------------------------------------------------  --------------  [facility] INFO  What is the best way for the office to contact you? Do not leave any   message, patient will call back for answer  Preferred Call Back Phone Number? [phone]  ---------------------------------------------------------------------------  --------------  SCRIPT ANSWERS  Relationship to Patient?  Self

## 2023-12-13 NOTE — TELEPHONE ENCOUNTER
PDMP monitoring:  -PDMP (prescription drug monitoring report) was obtained and reviewed for the past one year and no indicators of drug misuse were found.   -Any other controlled substance prescriptions seen on the record have been accounted for. I am aware of the patient receiving these medications. -PDMP report will be re-checked prior (or during) office visit and prior to medication refill request.  -Last PDMP report reviewed on:   Last PDMP Brit Lawson as Reviewed [facility]):  Review User Review Instant Review Result   Kizzy Vega, [id number] [facility] 12/13/2023  3:33 PM Reviewed PDMP [1]     Rx sent. [facility] P[facility]  [facility]

## 2023-12-26 DIAGNOSIS — F41.1 GENERALIZED ANXIETY DISORDER WITH PANIC ATTACKS: ICD-10-CM

## 2023-12-26 DIAGNOSIS — F41.0 GENERALIZED ANXIETY DISORDER WITH PANIC ATTACKS: ICD-10-CM

## 2023-12-27 RX ORDER — BUSPIRONE HYDROCHLORIDE 10 MG/1
10 TABLET ORAL 2 TIMES DAILY
Qty: 60 TABLET | Refills: 5 | Status: SHIPPED | OUTPATIENT
Start: 2023-12-27

## 2024-01-06 DIAGNOSIS — J43.9 PULMONARY EMPHYSEMA, UNSPECIFIED EMPHYSEMA TYPE (HCC): ICD-10-CM

## 2024-01-06 DIAGNOSIS — G43.E09 CHRONIC MIGRAINE WITH AURA: ICD-10-CM

## 2024-01-08 RX ORDER — ATOGEPANT 10 MG/1
1 TABLET ORAL DAILY
Qty: 90 TABLET | Refills: 3 | Status: SHIPPED | OUTPATIENT
Start: 2024-01-08

## 2024-01-08 RX ORDER — UMECLIDINIUM BROMIDE AND VILANTEROL TRIFENATATE 62.5; 25 UG/1; UG/1
POWDER RESPIRATORY (INHALATION)
Qty: 3 EACH | Refills: 1 | Status: SHIPPED | OUTPATIENT
Start: 2024-01-08

## 2024-01-10 ENCOUNTER — TELEPHONE (OUTPATIENT)
Dept: ADMINISTRATIVE | Age: 49
End: 2024-01-10

## 2024-01-10 NOTE — TELEPHONE ENCOUNTER
Submitted PA for Trulicity 3MG/0.5ML pen-injectors  Via [facility] Key: [id number] STATUS: PENDING.    Follow up done daily; if no decision with in three days we will refax.  If another three days goes by with no decision will call the insurance for status.

## 2024-01-11 NOTE — TELEPHONE ENCOUNTER
APPROVAL for Trulicity 3MG/0.5ML pen-injectors 01/10/24-01/10/25; letter attached.    If this requires a response please respond to the pool ( [facility] MEDICATION PRE-AUTH).      Thank you please advise patient.

## 2024-01-27 NOTE — PROGRESS NOTES
the note may possibly contain various errors (e.g. spelling, grammar, and non-sensible words/phrases/statements) despite reviewing the note prior to signing it for completion.      Time spent includes some or all of the following, both face-to-face time and non face-to-face time, but is not limited to:  [x] Preparing to see the patient by reviewing medical records available (notes, labs, imaging, etc.) prior to seeing the patient.  [x] Obtaining and/or reviewing the history from the patient during the visit.  [x] Performing a medically appropriate examination.  [x] Ordering of relevant lab work, medications, referrals, or procedures when indicated.  [x] Discussing patient's medical issues and formulating an assessment and plan.   [x] Reviewing plan of care with patient.  Answering any questions or concerns.   [x] Documentation of the visit within the electronic health record (EHR).  [x] Reviewing records of history relevant to patient's issues after seeing the patient.  [] Discussion or coordination of care with other health care professionals.  [x] Other: length office visit - 45 minutes. Estimate additional time spent on other things outside of the office visit - 11 minutes.  -I spent a significant amount of time discussing various issues as noted above and also with formulating a treatment plan for each specific issue. Patient was given the opportunity to ask me any questions and address any concerns/issues. I also reviewed lab work (if available) as well as prior notes from PCP and/or other specialists if available.   [] An  was used during today's visit.  Care and attention was made to make a conscious effort to speak in short, comprehensible phrases.  I had to (at times) repeat or rephrase what I had said to the patient and effort was made to allow ample time for both patient and  to speak without interruption.      Damon Mireles M.D.  Family Medicine  [facility] -

## 2024-02-01 ENCOUNTER — OFFICE VISIT (OUTPATIENT)
Dept: FAMILY MEDICINE CLINIC | Age: 49
End: 2024-02-01

## 2024-02-01 VITALS
SYSTOLIC BLOOD PRESSURE: 130 MMHG | HEIGHT: 67 IN | OXYGEN SATURATION: 92 % | DIASTOLIC BLOOD PRESSURE: 78 MMHG | WEIGHT: 178.8 LBS | TEMPERATURE: 97.2 F | HEART RATE: 98 BPM | BODY MASS INDEX: 28.06 KG/M2

## 2024-02-01 DIAGNOSIS — J43.9 PULMONARY EMPHYSEMA, UNSPECIFIED EMPHYSEMA TYPE (HCC): ICD-10-CM

## 2024-02-01 DIAGNOSIS — Z99.2 TYPE 2 DM WITH HYPERTENSION AND ESRD ON DIALYSIS (HCC): Primary | ICD-10-CM

## 2024-02-01 DIAGNOSIS — E77.8 HYPOPROTEINEMIA (HCC): ICD-10-CM

## 2024-02-01 DIAGNOSIS — E11.3523 BOTH EYES AFFECTED BY PROLIFERATIVE DIABETIC RETINOPATHY WITH TRACTION RETINAL DETACHMENTS INVOLVING MACULAE, ASSOCIATED WITH TYPE 2 DIABETES MELLITUS (HCC): ICD-10-CM

## 2024-02-01 DIAGNOSIS — D68.9 COAGULATION DEFECT (HCC): ICD-10-CM

## 2024-02-01 DIAGNOSIS — F41.1 GENERALIZED ANXIETY DISORDER WITH PANIC ATTACKS: ICD-10-CM

## 2024-02-01 DIAGNOSIS — Z99.89 DEPENDENT ON WALKER FOR AMBULATION: ICD-10-CM

## 2024-02-01 DIAGNOSIS — E11.22 TYPE 2 DM WITH HYPERTENSION AND ESRD ON DIALYSIS (HCC): Primary | ICD-10-CM

## 2024-02-01 DIAGNOSIS — N18.6 TYPE 2 DM WITH HYPERTENSION AND ESRD ON DIALYSIS (HCC): Primary | ICD-10-CM

## 2024-02-01 DIAGNOSIS — Z02.89 MEDICATION MANAGEMENT CONTRACT AGREEMENT: ICD-10-CM

## 2024-02-01 DIAGNOSIS — F41.0 GENERALIZED ANXIETY DISORDER WITH PANIC ATTACKS: ICD-10-CM

## 2024-02-01 DIAGNOSIS — I12.0 TYPE 2 DM WITH HYPERTENSION AND ESRD ON DIALYSIS (HCC): Primary | ICD-10-CM

## 2024-02-01 DIAGNOSIS — F13.20 BENZODIAZEPINE DEPENDENCE, CONTINUOUS (HCC): ICD-10-CM

## 2024-02-01 RX ORDER — NEBIVOLOL 5 MG/1
5 TABLET ORAL DAILY
Qty: 30 TABLET | Refills: 2 | Status: SHIPPED | OUTPATIENT
Start: 2024-02-01

## 2024-02-01 ASSESSMENT — PATIENT HEALTH QUESTIONNAIRE - PHQ9
SUM OF ALL RESPONSES TO PHQ9 QUESTIONS 1 & 2: 0
5. POOR APPETITE OR OVEREATING: 0
8. MOVING OR SPEAKING SO SLOWLY THAT OTHER PEOPLE COULD HAVE NOTICED. OR THE OPPOSITE, BEING SO FIGETY OR RESTLESS THAT YOU HAVE BEEN MOVING AROUND A LOT MORE THAN USUAL: 0
6. FEELING BAD ABOUT YOURSELF - OR THAT YOU ARE A FAILURE OR HAVE LET YOURSELF OR YOUR FAMILY DOWN: 0
SUM OF ALL RESPONSES TO PHQ QUESTIONS 1-9: 0
10. IF YOU CHECKED OFF ANY PROBLEMS, HOW DIFFICULT HAVE THESE PROBLEMS MADE IT FOR YOU TO DO YOUR WORK, TAKE CARE OF THINGS AT HOME, OR GET ALONG WITH OTHER PEOPLE: 0
SUM OF ALL RESPONSES TO PHQ QUESTIONS 1-9: 0
4. FEELING TIRED OR HAVING LITTLE ENERGY: 0
SUM OF ALL RESPONSES TO PHQ QUESTIONS 1-9: 0
1. LITTLE INTEREST OR PLEASURE IN DOING THINGS: 0
2. FEELING DOWN, DEPRESSED OR HOPELESS: 0
7. TROUBLE CONCENTRATING ON THINGS, SUCH AS READING THE NEWSPAPER OR WATCHING TELEVISION: 0
SUM OF ALL RESPONSES TO PHQ QUESTIONS 1-9: 0
9. THOUGHTS THAT YOU WOULD BE BETTER OFF DEAD, OR OF HURTING YOURSELF: 0
3. TROUBLE FALLING OR STAYING ASLEEP: 0

## 2024-02-01 ASSESSMENT — ENCOUNTER SYMPTOMS
TROUBLE SWALLOWING: 0
COUGH: 0
WHEEZING: 0
SINUS PRESSURE: 0
RHINORRHEA: 0
DIARRHEA: 0
ABDOMINAL DISTENTION: 0
CHEST TIGHTNESS: 0
BACK PAIN: 0
SHORTNESS OF BREATH: 0
NAUSEA: 0
VOMITING: 0
BLOOD IN STOOL: 0
ABDOMINAL PAIN: 0
CONSTIPATION: 0

## 2024-02-01 NOTE — PROGRESS NOTES
Patient ID: [id number]     Chief Complaint: Establish Care        HPI:     HPI      Robyn Guerrero is a 23 y.o. female here today to establish care. Pt has hx POTS, Sinus Tachycardia, chest pain, Anxiety, Gestational diabetes. Pt followed in Cardio cl. Keep appts. Pt states she needs GYN referral for annual exam.        Immunizations:   Immunization History   Administered Date(s) Administered    DT (Pediatric) 07/04/2010    DTP 2000, 2000, 01/23/2001    DTaP 07/24/2001, 08/11/2005    HIB 2000, 2000, 01/23/2001, 07/24/2001    HPV 9-Valent 10/16/2017    Hep B / HiB 2000, 01/23/2001    Hepatitis B 2000, 2000, 01/23/2001    Hepatitis B, Pediatric/Adolescent 2000    HiB PRP-T 2000, 07/24/2001    IPV 08/11/2005    Influenza 12/04/2019, 11/09/2020    Influenza - Quadrivalent - PF *Preferred* (6 months and older) 12/04/2019, 11/09/2020    MMR 07/24/2001, 08/11/2005    Meningococcal Conjugate (MCV4P) 08/10/2013, 10/16/2017    OPV 2000, 2000, 01/23/2001    Pneumococcal Conjugate - 13 Valent 2000, 07/24/2001    Pneumococcal Conjugate - 7 Valent 2000, 07/24/2001    Poliovirus 2000, 2000, 01/23/2001    Tdap 08/10/2013    Varicella 07/30/2001, 07/20/2010, 08/10/2013        ----------------------------  Anxiety disorder, unspecified  Irregular uterine contractions     Past Surgical History:   Procedure Laterality Date    TONSILECTOMY      TYMPANOSTOMY TUBE PLACEMENT         Review of patient's allergies indicates:   Allergen Reactions    House dust Hives, Itching, Nausea And Vomiting and Shortness Of Breath       Current Outpatient Medications   Medication Instructions    EScitalopram oxalate (LEXAPRO) 5 mg, Oral, Daily       Social History     Socioeconomic History    Marital status: Significant Other   Tobacco Use    Smoking status: Never     Passive exposure: Never    Smokeless tobacco: Never   Substance and Sexual Activity    Alcohol  MD Ladan Plunkett MD Cathaleen Lacks, MD                Office: [phone]                      Fax: [phone]          Inpatient  Progress Note:     PATIENT NAME: Robert Arguelles  : 1975  MRN: [id number]         IMPRESSION / RECOMMENDATION:       Admitted for:  Recurrent falls [R29.6]       1. ESRD on iHD: MWF ,  - follows w/ Dr. Shannon Chapa- [facility] dialysis center   - access: [facility]   - consult vascular surgery for AV access     - outpt HD center: [facility], Dr Shannon Chapa   - recently started HD in 2021, during admission w/ ATN w/ KINZA on CKD-4, was lost for f/u,), had acute hypoxic respiratory failure, pneumonia - needing intubation in past   - missed HD x2  - encouraged compliance as if her solutes are better control, she may feel overall better    - so needed HD on admission during inpatient stay  - now admitted to ARU for inpatient rehab      HD Wednesday    Labs MWF - f/u K   Low K diet  Avoid LARRY blockade     EDW listed 88 (but per pt 85 kg)  81 kg pre-HD So minimal UF needed       2. Hypertension/Volume Status:  - BP HTN - hx of resistant HTN - slightly uncontrolled - f/u after HD + resume home meds   - EDW reported 88  -> 83 kg currently - so might be her new DW  - Na - chronic hypoNatremia - f/u w/ HD       3. Electrolytes/acid-base:  K: WNL  High AG metabolic acidosis: mild - f/u w/ HD      4. Anemia of renal failure: at goal  - RIA: w/ HD     5. BMD:  - Phos: follow iPTH outpt     D/W Pt, Dr Moy Gomez        : Other supportive care :   - Check daily renal function panel with electrolytes-phosphorus  - Strict monitoring of I/Os, daily weight  - Renal feeds/diet  - Current medications reviewed. - Nephrotoxic medications have been discontinued. - Dose adjusted and appropriate.                             - Dose meds for eGFR <15 mL/min/1.73m2.               - Avoid heavy opioids due to renal failure - may use very low dose dilaudid / fentanyl with close monitoring of CNS and respiratory depression.             Other Problems:  Recurrent falls [R29.6]       Please refer to orders. Multiple complex problems. High risk  Discussed with patient, and treatment team    Thank you for allowing me to participate in this patient's care. Please do not hesitate to contact me with any questions/concerns. We will follow along with you.           Michael Uribe MD  [facility] [address] [place]: [phone] or [address] [employer]  Fax: [phone]     Time spent  ~ 35 minutes that included face-to-face meeting/discussion with patient, and treatment team (including primary/referring team and other consultants; included coordination of care with the treatment team; and review of patient's electronic medical records and ordering appropriates tests.          Subjective:  No complaints-distress  BP better     Vitals:    01/10/22 2023   BP: 105/74   Pulse: 96   Resp: 16   Temp: 97.6 °F (36.4 °C)   SpO2: 97%       Awake, co-operative    Neck: Supple. no JVD. Cardiac: S1 and S2+, No pericardial rub. Chest: Normal respiratory effort,  mild Rales.  No rhonchi  Ext :  LE Edema +, no cynosis         Labs Reviewed  by me   Labs   Lab Results   Component Value Date    CREATININE 7.1 (HH) 01/10/2022    BUN 61 (H) 01/10/2022     (L) 01/10/2022    K 5.4 (H) 01/10/2022    CL 96 (L) 01/10/2022    CO2 21 01/10/2022     Lab Results   Component Value Date    WBC 10.9 01/10/2022    HGB 11.2 (L) 01/10/2022    HCT 35.0 (L) 01/10/2022    MCV 81.8 01/10/2022     01/10/2022       Dialysis Treatment and Prescription reviewed use: Not Currently    Drug use: Never     Comment: CBD use    Sexual activity: Not Currently     Partners: Male     Birth control/protection: None     Social Determinants of Health     Financial Resource Strain: Medium Risk (1/10/2024)    Overall Financial Resource Strain (CARDIA)     Difficulty of Paying Living Expenses: Somewhat hard   Food Insecurity: Food Insecurity Present (1/10/2024)    Hunger Vital Sign     Worried About Running Out of Food in the Last Year: Sometimes true     Ran Out of Food in the Last Year: Never true   Transportation Needs: No Transportation Needs (2/1/2024)    PRAPARE - Transportation     Lack of Transportation (Medical): No     Lack of Transportation (Non-Medical): No   Physical Activity: Sufficiently Active (1/10/2024)    Exercise Vital Sign     Days of Exercise per Week: 5 days     Minutes of Exercise per Session: 40 min   Stress: Stress Concern Present (1/10/2024)    [employer] [place] [employer] - Occupational Stress Questionnaire     Feeling of Stress : Rather much   Social Connections: Moderately Integrated (1/10/2024)    Social Connection and Isolation Panel [NHANES]     Frequency of Communication with Friends and Family: Three times a week     Frequency of Social Gatherings with Friends and Family: Patient declined     Attends Islam Services: 1 to 4 times per year     Active Member of Clubs or Organizations: No     Attends Club or Organization Meetings: Never     Marital Status: Living with partner   Housing Stability: High Risk (1/10/2024)    Housing Stability Vital Sign     Unable to Pay for Housing in the Last Year: Yes     Number of Places Lived in the Last Year: 1     Unstable Housing in the Last Year: No        Family History   Problem Relation Age of Onset    Lung cancer Maternal Aunt     Uterine cancer Maternal Aunt     Breast cancer Neg Hx     Colon cancer Neg Hx     Ovarian cancer Neg Hx     Cervical cancer Neg Hx         Patient Care Team:  Pollo  "TAURUS Garcia as PCP - General (Family Medicine)     Subjective:     Review of Systems     See HPI for details    Constitutional: Denies Change in appetite. Denies Chills. Denies Fever. Denies Night sweats.  Eye: Denies Blurred vision. Denies Discharge. Denies Eye pain.  ENT: Denies Decreased hearing. Denies Sore throat. Denies Swollen glands.  Respiratory: Denies Cough. Denies Shortness of breath. Denies Shortness of breath with exertion. Denies Wheezing.  Cardiovascular: DeniesChest pain at rest. Denies Chest pain with exertion. Denies Irregular heartbeat. Denies Palpitations. Denies Edema.  Gastrointestinal: Denies Abdominal pain. DeniesDiarrhea. Denies Nausea. Denies Vomiting. Denies Hematemesis or Hematochezia.  Genitourinary: Denies Dysuria. Denies Urinary frequency. Denies Urinary urgency. Denies Blood in urine.  Endocrine: Denies Cold intolerance. Denies Excessive thirst. Denies Heat intolerance. Denies Weight loss. Denies Weight gain.  Musculoskeletal: Denies Painful joints. Denies Weakness.  Integumentary: Denies Rash. Denies Itching. Denies Dry skin.  Neurologic: Denies Dizziness. Denies Fainting. Denies Headache.  Psychiatric: Denies Depression. Denies Anxiety. Denies Suicidal/Homicidal ideations.    All Other ROS: Negative except as stated in HPI.       Objective:     Visit Vitals  /74 (BP Location: Right arm, Patient Position: Sitting, BP Method: Medium (Automatic))   Pulse 91   Temp 98.4 °F (36.9 °C) (Oral)   Resp 18   Ht 5' 2" (1.575 m)   Wt 50.8 kg (112 lb)   LMP 12/04/2023 (Approximate)   BMI 20.49 kg/m²       Physical Exam    General: Alert and oriented, No acute distress.  Head: Normocephalic, Atraumatic.  Eye: Pupils are equal, round and reactive to light, Extraocular movements are intact, Sclera non-icteric.  Ears/Nose/Throat: Normal, Mucosa moist,Clear.  Neck/Thyroid: Supple, Non-tender, No carotid bruit, No lymphadenopathy, No JVD, Full range of motion.  Respiratory: Clear to " "auscultation bilaterally; No wheezes, rales or rhonchi,Non-labored respirations, Symmetrical chest wall expansion.  Cardiovascular: Regular rate and rhythm, S1/S2 normal, No murmurs, rubs or gallops.  Gastrointestinal: Soft, Non-tender, Non-distended, Normal bowel sounds, No palpable organomegaly.  Musculoskeletal: Normal range of motion.  Integumentary: Warm, Dry, Intact, No suspicious lesions or rashes.  Extremities: No clubbing, cyanosis or edema  Neurologic: No focal deficits, Cranial Nerves II-XII are grossly intact, Motor strength normal upper and lower extremities, Sensory exam intact.  Psychiatric: Normal interaction, Coherent speech, Euthymic mood, Appropriate affect       Labs Reviewed:     Chemistry:  Lab Results   Component Value Date     01/13/2024    K 3.8 01/13/2024    CHLORIDE 106 01/13/2024    BUN 3.6 (L) 01/13/2024    CREATININE 0.59 01/13/2024    EGFRNORACEVR >60 01/13/2024    GLUCOSE 85 01/13/2024    CALCIUM 8.4 01/13/2024    ALKPHOS 42 01/13/2024    LABPROT 5.9 (L) 01/13/2024    ALBUMIN 3.4 (L) 01/13/2024    BILIDIR 0.4 06/14/2021    IBILI 0.40 06/14/2021    AST 12 01/13/2024    ALT 8 01/13/2024    MG 1.50 (L) 01/10/2024    PHOS 2.5 01/09/2024        Lab Results   Component Value Date    HGBA1C 4.7 01/11/2024        Hematology:  Lab Results   Component Value Date    WBC 2.61 (L) 01/13/2024    HGB 11.2 (L) 01/13/2024    HCT 33.7 (L) 01/13/2024     01/13/2024       Lipid Panel:  No results found for: "CHOL", "HDL", "LDL", "TRIG", "TOTALCHOLEST"     Urine:  Lab Results   Component Value Date    COLORUA Colorless (A) 01/11/2024    APPEARANCEUA Clear 01/11/2024    SGUA 1.008 01/11/2024    PHUA 5.5 01/11/2024    PROTEINUA Negative 01/11/2024    GLUCOSEUA 3+ (A) 01/11/2024    KETONESUA 2+ (A) 01/11/2024    BLOODUA Negative 01/11/2024    NITRITESUA Negative 01/11/2024    LEUKOCYTESUR Negative 01/11/2024    RBCUA 0-5 01/11/2024    WBCUA 0-5 01/11/2024    BACTERIA None Seen 01/11/2024    " SQEPUA Trace (A) 01/11/2024    HYALINECASTS None Seen 01/11/2024        Assessment:       ICD-10-CM ICD-9-CM   1. Anxiety disorder, unspecified type  F41.9 300.00   2. Sinus tachycardia by electrocardiography  R00.0 785.0   3. Screening for hypertension  Z13.6 V81.1   4. Well adult exam  Z00.00 V70.0   5. POTS (postural orthostatic tachycardia syndrome)  G90.A 427.89   6. History of gestational diabetes  Z86.32 V12.21   7. Abnormal CBC  R79.89 790.6        Plan:     1. Sinus tachycardia by electrocardiography  Pt followed in Cardio cl. Pt seen 1-16-24. Keep f/u appts. ER precautions given.   - Ambulatory referral/consult to Internal Medicine    2. Anxiety disorder, unspecified type  Denies SI/HI. Pt states she is currently on Lexapro and is stable on current dosage. Cont Lexapro as prescribed.     3. Screening for hypertension  Labs in 3 months.     4. Well adult exam  Labs in 3 months.     5. POTS (postural orthostatic tachycardia syndrome)  Pt followed in Cardio cl. Pt seen 1-16-24. Keep f/u appts. ER precautions given.  Per Cardio note 1-16-24:  Per Cardio:  educated on: POTS diagnosis, increasing salt intake, wearing compression stockings, and countermaneuvers to raise BP when symptomatic      Pt states she is interested in filing for disability for POTS and Anxiety as it makes it hard for her to work. Informed pt we do not participate with disability determinations and she will have to go to  office to file and see a disability doctor for determination.      Follow up in about 3 months (around 5/1/2024) for with labs 1 week prior to appt. . In addition to their scheduled follow up, the patient has also been instructed to follow up on as needed basis.     Future Appointments   Date Time Provider Department Center   7/16/2024  8:00 AM Ana Parker MD [facility], [facility]

## 2024-02-14 ENCOUNTER — COMMUNITY OUTREACH (OUTPATIENT)
Dept: FAMILY MEDICINE CLINIC | Age: 49
End: 2024-02-14

## 2024-02-25 DIAGNOSIS — Z99.2 TYPE 2 DM WITH HYPERTENSION AND ESRD ON DIALYSIS (HCC): ICD-10-CM

## 2024-02-25 DIAGNOSIS — I12.0 TYPE 2 DM WITH HYPERTENSION AND ESRD ON DIALYSIS (HCC): ICD-10-CM

## 2024-02-25 DIAGNOSIS — E11.22 TYPE 2 DM WITH HYPERTENSION AND ESRD ON DIALYSIS (HCC): ICD-10-CM

## 2024-02-25 DIAGNOSIS — N18.6 TYPE 2 DM WITH HYPERTENSION AND ESRD ON DIALYSIS (HCC): ICD-10-CM

## 2024-02-27 RX ORDER — CLONIDINE HYDROCHLORIDE 0.2 MG/1
TABLET ORAL
Qty: 270 TABLET | Refills: 1 | Status: SHIPPED | OUTPATIENT
Start: 2024-02-27

## 2024-03-01 DIAGNOSIS — I12.0 TYPE 2 DM WITH HYPERTENSION AND ESRD ON DIALYSIS (HCC): ICD-10-CM

## 2024-03-01 DIAGNOSIS — N18.6 TYPE 2 DM WITH HYPERTENSION AND ESRD ON DIALYSIS (HCC): ICD-10-CM

## 2024-03-01 DIAGNOSIS — Z99.2 TYPE 2 DM WITH HYPERTENSION AND ESRD ON DIALYSIS (HCC): ICD-10-CM

## 2024-03-01 DIAGNOSIS — E11.22 TYPE 2 DM WITH HYPERTENSION AND ESRD ON DIALYSIS (HCC): ICD-10-CM

## 2024-03-01 RX ORDER — AZILSARTAN KAMEDOXOMIL 80 MG/1
1 TABLET ORAL DAILY
Qty: 90 TABLET | Refills: 1 | Status: SHIPPED | OUTPATIENT
Start: 2024-03-01

## 2024-03-11 DIAGNOSIS — Z02.89 MEDICATION MANAGEMENT CONTRACT AGREEMENT: ICD-10-CM

## 2024-03-11 DIAGNOSIS — F19.20 CONTROLLED DRUG DEPENDENCE (HCC): ICD-10-CM

## 2024-03-11 DIAGNOSIS — F41.1 GENERALIZED ANXIETY DISORDER WITH PANIC ATTACKS: ICD-10-CM

## 2024-03-11 DIAGNOSIS — F41.0 GENERALIZED ANXIETY DISORDER WITH PANIC ATTACKS: ICD-10-CM

## 2024-03-11 DIAGNOSIS — F13.20 BENZODIAZEPINE DEPENDENCE, CONTINUOUS (HCC): ICD-10-CM

## 2024-03-11 RX ORDER — ALPRAZOLAM 2 MG/1
2 TABLET, EXTENDED RELEASE ORAL EVERY MORNING
Qty: 90 TABLET | Refills: 0 | Status: SHIPPED | OUTPATIENT
Start: 2024-03-11 | End: 2024-06-09

## 2024-03-11 NOTE — TELEPHONE ENCOUNTER
Pt requesting refill:    ALPRAZolam (ALPRAZOLAM XR) 2 MG extended release tablet 90 tablet 0 12/13/2023 3/12/2024    Sig - Route: Take 1 tablet by mouth every morning for 90 days. Max Daily Amount: 2 mg - Oral      Last ordered 12/13/23    LOV:  2/1/24  NOV:  5/2/24    [address]   P:  [phone]  F:  [phone]

## 2024-03-11 NOTE — TELEPHONE ENCOUNTER
PDMP monitoring:  -PDMP (prescription drug monitoring report) was obtained and reviewed for the past one year and no indicators of drug misuse were found.   -Any other controlled substance prescriptions seen on the record have been accounted for.  I am aware of the patient receiving these medications.  -PDMP report will be re-checked prior (or during) office visit and prior to medication refill request.  -Last PDMP report reviewed on:   Last PDMP [place] as Reviewed ([place]):  Review User Review Instant Review Result   DAMON JAIMES 3/11/2024 12:13 PM Reviewed PDMP [1]     Rx sent.    Damon Jaimes MD  Family Medicine  [facility]

## 2024-03-17 NOTE — PROGRESS NOTES
[facility]   Vascular Surgery Followup    Referring Provider:  Damon Mireles MD     No chief complaint on file.       History of Present Illness:  48-year-old female known to me with history of right brachiocephalic fistula creation in February 2022 referred back secondary to difficulties with access.  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  There is suggestion to raise her cephalic vein on the right    Past Medical History:   has a past medical history of Acute respiratory failure due to COVID-19 ([facility]), Arterial ischemic stroke, ICA, left, acute ([facility]), Blind in both eyes, Cerebral artery occlusion with cerebral infarction ([facility]), CHF (congestive heart failure) ([facility]), Chronic kidney disease, COPD (chronic obstructive pulmonary disease) (HCC), Depression, Diabetes mellitus out of control, Diabetes mellitus, type II ([facility]), Diabetic neuropathy associated with type 2 diabetes mellitus ([facility]), Generalized headaches, Hypertension, Infertility, Insomnia, Migraine headache, Mixed hyperlipidemia, Otitis media, Pelvic abscess in female, Pneumonia, Stroke ([facility]), and Stroke ([facility]).    Surgical History:   has a past surgical history that includes Cervix surgery; eye surgery; Foot surgery (Right); Foot surgery (Bilateral); Foot surgery (Left); IR TUNNELED CVC PLACE WO SQ PORT/PUMP > 5 YEARS (9/7/2021); and Dialysis fistula creation (Right, 2/10/2022).     Social History:   reports that she has been smoking cigarettes. She has never used smokeless tobacco. She reports that she does not drink alcohol and does not use drugs.     Family History:  family history includes Alcohol Abuse in her maternal grandfather and paternal grandfather; Colon Cancer in her father; Diabetes in her father, mother, paternal aunt, paternal grandmother, paternal uncle, and sister; High Blood Pressure in her father; Other (age of onset: 67) in her mother.

## 2024-03-19 ENCOUNTER — OFFICE VISIT (OUTPATIENT)
Dept: VASCULAR SURGERY | Age: 49
End: 2024-03-19
Payer: COMMERCIAL

## 2024-03-19 VITALS
HEIGHT: 67 IN | SYSTOLIC BLOOD PRESSURE: 136 MMHG | WEIGHT: 174.16 LBS | DIASTOLIC BLOOD PRESSURE: 76 MMHG | BODY MASS INDEX: 27.34 KG/M2

## 2024-03-19 DIAGNOSIS — N18.6 ESRD (END STAGE RENAL DISEASE) ON DIALYSIS (HCC): Primary | ICD-10-CM

## 2024-03-19 DIAGNOSIS — Z99.2 ESRD (END STAGE RENAL DISEASE) ON DIALYSIS (HCC): Primary | ICD-10-CM

## 2024-03-19 PROCEDURE — 99213 OFFICE O/P EST LOW 20 MIN: CPT | Performed by: SURGERY

## 2024-03-19 PROCEDURE — 4004F PT TOBACCO SCREEN RCVD TLK: CPT | Performed by: SURGERY

## 2024-03-19 PROCEDURE — 3075F SYST BP GE 130 - 139MM HG: CPT | Performed by: SURGERY

## 2024-03-19 PROCEDURE — G8427 DOCREV CUR MEDS BY ELIG CLIN: HCPCS | Performed by: SURGERY

## 2024-03-19 PROCEDURE — G8484 FLU IMMUNIZE NO ADMIN: HCPCS | Performed by: SURGERY

## 2024-03-19 PROCEDURE — G8417 CALC BMI ABV UP PARAM F/U: HCPCS | Performed by: SURGERY

## 2024-03-19 PROCEDURE — 3078F DIAST BP <80 MM HG: CPT | Performed by: SURGERY

## 2024-03-20 ENCOUNTER — TELEPHONE (OUTPATIENT)
Dept: FAMILY MEDICINE CLINIC | Age: 49
End: 2024-03-20

## 2024-03-20 NOTE — TELEPHONE ENCOUNTER
----- Message from Binta Saenz sent at 3/20/2024  9:30 AM EDT -----  Subject: Medication Problem     Medication: ALPRAZolam (ALPRAZOLAM XR) 2 MG extended release tablet  Dosage: Take 1 tablet by mouth every morning for 90 days. Max Daily   Amount: 2 mg  Ordering Provider:     Question/Problem: Patient needs to have a letter sent or Prior Auth done   so she can continue getting her medication filled by the insurance. She   has [employer]am[employer] and they are stating that they did a courtsey refill this time   but they wont refill it again without proper documentation.      Pharmacy: [id number] - [place] [id number] [place] [place]   [phone] - F [phone]    ---------------------------------------------------------------------------  --------------  CALL BACK INFO  [phone]; Do not leave any message, patient will call back for answer  ---------------------------------------------------------------------------  --------------    SCRIPT ANSWERS  Relationship to Patient: Self

## 2024-03-21 NOTE — TELEPHONE ENCOUNTER
APPROVAL for ALPRAZolam ER 2MG er tablets 03/21/24-03/21/25; letter attached.    If this requires a response please respond to the pool ( P [facility] MEDICATION PRE-AUTH).      Thank you please advise patient.

## 2024-03-22 ENCOUNTER — TELEPHONE (OUTPATIENT)
Dept: VASCULAR SURGERY | Age: 49
End: 2024-03-22

## 2024-03-22 NOTE — TELEPHONE ENCOUNTER
Discussed options with patient.  I did discuss raising her right cephalic fistula however she has a central vein occlusion and she does not feel comfortable moving forward with that after conversations at [facility].  She would like to have her left arm vein map which I think is reasonable.  We will review that and regroup on best options.  If there is not significant options in the left may need to revisit raising her right arm fistula.

## 2024-04-10 ENCOUNTER — HOSPITAL ENCOUNTER (EMERGENCY)
Age: 49
Discharge: HOME | End: 2024-04-10
Attending: EMERGENCY MEDICINE
Payer: COMMERCIAL

## 2024-04-10 VITALS
SYSTOLIC BLOOD PRESSURE: 135 MMHG | DIASTOLIC BLOOD PRESSURE: 64 MMHG | RESPIRATION RATE: 18 BRPM | TEMPERATURE: 98.2 F | HEIGHT: 67 IN | OXYGEN SATURATION: 96 % | WEIGHT: 174.16 LBS | BODY MASS INDEX: 27.34 KG/M2 | HEART RATE: 93 BPM

## 2024-04-10 DIAGNOSIS — N18.9 ANEMIA OF CHRONIC RENAL FAILURE, UNSPECIFIED CKD STAGE: Primary | ICD-10-CM

## 2024-04-10 DIAGNOSIS — R89.9 ABNORMAL LABORATORY TEST RESULT: ICD-10-CM

## 2024-04-10 DIAGNOSIS — D63.1 ANEMIA OF CHRONIC RENAL FAILURE, UNSPECIFIED CKD STAGE: Primary | ICD-10-CM

## 2024-04-10 LAB
ABO + RH BLD: NORMAL
ANION GAP SERPL CALCULATED.3IONS-SCNC: 16 MMOL/L (ref 3–16)
BASOPHILS # BLD: 0 K/UL (ref 0–0.2)
BASOPHILS NFR BLD: 0.6 %
BLD GP AB SCN SERPL QL: NORMAL
BUN SERPL-MCNC: 29 MG/DL (ref 7–20)
CALCIUM SERPL-MCNC: 8.9 MG/DL (ref 8.3–10.6)
CHLORIDE SERPL-SCNC: 93 MMOL/L (ref 99–110)
CO2 SERPL-SCNC: 30 MMOL/L (ref 21–32)
CREAT SERPL-MCNC: 6.7 MG/DL (ref 0.6–1.1)
DEPRECATED RDW RBC AUTO: 17.3 % (ref 12.4–15.4)
EOSINOPHIL # BLD: 0.1 K/UL (ref 0–0.6)
EOSINOPHIL NFR BLD: 2.5 %
GFR SERPLBLD CREATININE-BSD FMLA CKD-EPI: 7 ML/MIN/{1.73_M2}
GLUCOSE SERPL-MCNC: 117 MG/DL (ref 70–99)
HCT VFR BLD AUTO: 30.9 % (ref 36–48)
HGB BLD-MCNC: 10.6 G/DL (ref 12–16)
LYMPHOCYTES # BLD: 1 K/UL (ref 1–5.1)
LYMPHOCYTES NFR BLD: 17.4 %
MCH RBC QN AUTO: 30.9 PG (ref 26–34)
MCHC RBC AUTO-ENTMCNC: 34.3 G/DL (ref 31–36)
MCV RBC AUTO: 90.1 FL (ref 80–100)
MONOCYTES # BLD: 0.5 K/UL (ref 0–1.3)
MONOCYTES NFR BLD: 8.7 %
NEUTROPHILS # BLD: 4 K/UL (ref 1.7–7.7)
NEUTROPHILS NFR BLD: 70.8 %
PLATELET # BLD AUTO: 249 K/UL (ref 135–450)
PMV BLD AUTO: 7.5 FL (ref 5–10.5)
POTASSIUM SERPL-SCNC: 4.3 MMOL/L (ref 3.5–5.1)
RBC # BLD AUTO: 3.43 M/UL (ref 4–5.2)
SODIUM SERPL-SCNC: 139 MMOL/L (ref 136–145)
WBC # BLD AUTO: 5.6 K/UL (ref 4–11)

## 2024-04-10 PROCEDURE — 85025 COMPLETE CBC W/AUTO DIFF WBC: CPT

## 2024-04-10 PROCEDURE — 86900 BLOOD TYPING SEROLOGIC ABO: CPT

## 2024-04-10 PROCEDURE — 99283 EMERGENCY DEPT VISIT LOW MDM: CPT

## 2024-04-10 PROCEDURE — 86850 RBC ANTIBODY SCREEN: CPT

## 2024-04-10 PROCEDURE — 86901 BLOOD TYPING SEROLOGIC RH(D): CPT

## 2024-04-10 PROCEDURE — 80048 BASIC METABOLIC PNL TOTAL CA: CPT

## 2024-04-10 ASSESSMENT — LIFESTYLE VARIABLES
HOW OFTEN DO YOU HAVE A DRINK CONTAINING ALCOHOL: MONTHLY OR LESS
HOW MANY STANDARD DRINKS CONTAINING ALCOHOL DO YOU HAVE ON A TYPICAL DAY: 1 OR 2

## 2024-04-10 ASSESSMENT — PAIN - FUNCTIONAL ASSESSMENT: PAIN_FUNCTIONAL_ASSESSMENT: NONE - DENIES PAIN

## 2024-04-10 NOTE — CONSENT
Informed Consent for Blood Component Transfusion Note    I have discussed with the patient the rationale for blood component transfusion; its benefits in treating or preventing fatigue, organ damage, or death; and its risk which includes mild transfusion reactions, rare risk of blood borne infection, or more serious but rare reactions. I have discussed the alternatives to transfusion, including the risk and consequences of not receiving transfusion. The patient had an opportunity to ask questions and had agreed to proceed with transfusion of blood components.    Electronically signed by Stefanie Montalvo MD on 4/10/24 at 1:02 PM EDT

## 2024-04-10 NOTE — ED PROVIDER NOTES
[facility] EMERGENCY DEPARTMENT    CHIEF COMPLAINT  Abnormal Lab (Pt sent in for low hemoglobin, dialysis patient, sent by neurology, due for dialysis today)       HISTORY OF PRESENT ILLNESS  Kristine Ramirez is a 48 y.o. female who presents to the ED with abnormal lab test.    Dr Gonzalez - Nephrologist called and said her Hgb was 6.4 and told her to come to ER for blood transfusion.  ESRD on HD - MWF, She had HD Sunday, she is due for it today.   [facility] in [place]     Denies weakness, dizziness, CP, SOB. She's she feels a little \"foggy headed\"  Hx of prior transfusions  - last was ~ 1 yr ago   Denies melena, blood in stool, epistaxis, injury, bleeding from anywhere. It has been steadily been dropping.     I have reviewed the following from the nursing documentation:    Past Medical History:   Diagnosis Date    Acute respiratory failure due to COVID-19 ([facility]) 10/16/2021    Arterial ischemic stroke, ICA, left, acute ([facility])     Blind in both eyes     Cerebral artery occlusion with cerebral infarction ([facility])     CHF (congestive heart failure) ([facility])     Chronic kidney disease     COPD (chronic obstructive pulmonary disease) ([facility])     Depression     Diabetes mellitus out of control     Diabetes mellitus, type II ([facility])     2005    Diabetic neuropathy associated with type 2 diabetes mellitus ([facility])     Generalized headaches     Hypertension     Infertility     Insomnia     chronic vs lack of time spent to sleep    Migraine headache 11/09/2011    Mixed hyperlipidemia     Otitis media     h/o recurrent    Pelvic abscess in female 10/05/2013    Pneumonia     2004 approx.    Stroke ([facility]) 08/27/2020    Stroke ([facility]) 08/27/2021     Past Surgical History:   Procedure Laterality Date    CERVIX SURGERY      laser tx for dysplasia;1992    DIALYSIS FISTULA CREATION Right 2/10/2022    RIGHT BRACHIO CEPHALIC FISTULA CREATION performed by Christiano Lomeli II, MD at [facility] OR    EYE SURGERY      FOOT SURGERY Right     FOOT

## 2024-04-11 ENCOUNTER — COMMUNITY CARE MANAGEMENT (OUTPATIENT)
Facility: CLINIC | Age: 49
End: 2024-04-11

## 2024-04-18 ENCOUNTER — COMMUNITY CARE MANAGEMENT (OUTPATIENT)
Facility: CLINIC | Age: 49
End: 2024-04-18

## 2024-04-23 NOTE — PROGRESS NOTES
glucose readings frequently.  [x] No glucose readings reported.  -Glucose reading(s):  [] Low:  [] Max:  [] Other:  -Neuropathy symptoms: [x] Yes [] No  -Gastroparesis symptoms: [] Yes [x] No   -Visual disturbances: [] Yes [x] No  -Eye exam:   -Last one -  [] Wears glasses/contact lenses   -Medication adherence: [x] Yes [] No [] N/A  -Other comments: pt has been out of Trulicity due to availability issues.    HTN:  BP Readings from Last 3 Encounters:   05/02/24 134/80   04/10/24 135/64   03/19/24 136/76     -Patient has not been checking BP readings regularly.   -Patient denied issues with frequent headache, chest pain, shortness of breath, palpitations, malaise, etc.    Chronic heart failure:    Notable studies:  Lab Results   Component Value Date    LVEF 65 09/08/2021    LVEFMODE Cardiac Cath 09/08/2021     Lab Results   Component Value Date/Time    PROBNP 13,362 01/04/2022 01:09 PM    PROBNP 2,333 12/27/2021 03:38 PM    PROBNP 10,450 11/02/2021 08:35 PM     Recent health status:  [x] Overall stable - patient denied recurrent issues of chest pain, shortness of breath (at rest, exertion, and orthopnea), palpitations, malaise, decreased stamina, BLE edema, etc.  [] Symptomatic -   [] Weight stability:  [] Physical activity:    Allergies   Allergen Reactions    Amoxicillin Hives, Itching and Other (See Comments)     Tolerates cephalosporins  Patient tolerating cefazolin (ANCEF) as of October 11, 2018      Levofloxacin Anaphylaxis    Vancomycin Anaphylaxis, Hives and Shortness Of Breath    Tape [Adhesive Tape] Other (See Comments)     Paper tape turns skin bright red. Plastic tape okay.        Current Outpatient Medications on File Prior to Visit   Medication Sig Dispense Refill    ALPRAZolam (ALPRAZOLAM XR) 2 MG extended release tablet Take 1 tablet by mouth every morning for 90 days. Max Daily Amount: 2 mg 90 tablet 0    EDARBI 80 MG TABS TAKE 1 TABLET BY MOUTH DAILY 90 tablet 1    cloNIDine (CATAPRES) 0.2 MG tablet

## 2024-05-01 DIAGNOSIS — Z99.2 TYPE 2 DM WITH HYPERTENSION AND ESRD ON DIALYSIS (HCC): ICD-10-CM

## 2024-05-01 DIAGNOSIS — E11.22 TYPE 2 DM WITH HYPERTENSION AND ESRD ON DIALYSIS (HCC): ICD-10-CM

## 2024-05-01 DIAGNOSIS — I12.0 TYPE 2 DM WITH HYPERTENSION AND ESRD ON DIALYSIS (HCC): ICD-10-CM

## 2024-05-01 DIAGNOSIS — N18.6 TYPE 2 DM WITH HYPERTENSION AND ESRD ON DIALYSIS (HCC): ICD-10-CM

## 2024-05-01 RX ORDER — NEBIVOLOL 5 MG/1
5 TABLET ORAL DAILY
Qty: 30 TABLET | Refills: 2 | OUTPATIENT
Start: 2024-05-01

## 2024-05-02 ENCOUNTER — OFFICE VISIT (OUTPATIENT)
Dept: FAMILY MEDICINE CLINIC | Age: 49
End: 2024-05-02

## 2024-05-02 VITALS
OXYGEN SATURATION: 97 % | WEIGHT: 175 LBS | HEIGHT: 67 IN | SYSTOLIC BLOOD PRESSURE: 134 MMHG | TEMPERATURE: 97.5 F | DIASTOLIC BLOOD PRESSURE: 80 MMHG | BODY MASS INDEX: 27.47 KG/M2 | HEART RATE: 90 BPM

## 2024-05-02 DIAGNOSIS — E11.3523 BOTH EYES AFFECTED BY PROLIFERATIVE DIABETIC RETINOPATHY WITH TRACTION RETINAL DETACHMENTS INVOLVING MACULAE, ASSOCIATED WITH TYPE 2 DIABETES MELLITUS (HCC): ICD-10-CM

## 2024-05-02 DIAGNOSIS — E78.5 TYPE 2 DIABETES MELLITUS WITH HYPERLIPIDEMIA (HCC): ICD-10-CM

## 2024-05-02 DIAGNOSIS — E11.69 TYPE 2 DIABETES MELLITUS WITH HYPERLIPIDEMIA (HCC): ICD-10-CM

## 2024-05-02 DIAGNOSIS — G43.E09 CHRONIC MIGRAINE WITH AURA WITHOUT STATUS MIGRAINOSUS, NOT INTRACTABLE: ICD-10-CM

## 2024-05-02 DIAGNOSIS — Z99.89 DEPENDENT ON WALKER FOR AMBULATION: ICD-10-CM

## 2024-05-02 DIAGNOSIS — E11.69 TYPE 2 DIABETES MELLITUS WITH OBESITY (HCC): ICD-10-CM

## 2024-05-02 DIAGNOSIS — E11.22 TYPE 2 DM WITH HYPERTENSION AND ESRD ON DIALYSIS (HCC): Primary | ICD-10-CM

## 2024-05-02 DIAGNOSIS — E66.9 TYPE 2 DIABETES MELLITUS WITH OBESITY (HCC): ICD-10-CM

## 2024-05-02 DIAGNOSIS — N18.6 TYPE 2 DM WITH HYPERTENSION AND ESRD ON DIALYSIS (HCC): Primary | ICD-10-CM

## 2024-05-02 DIAGNOSIS — I12.0 TYPE 2 DM WITH HYPERTENSION AND ESRD ON DIALYSIS (HCC): Primary | ICD-10-CM

## 2024-05-02 DIAGNOSIS — J43.9 PULMONARY EMPHYSEMA, UNSPECIFIED EMPHYSEMA TYPE (HCC): ICD-10-CM

## 2024-05-02 DIAGNOSIS — Z99.2 TYPE 2 DM WITH HYPERTENSION AND ESRD ON DIALYSIS (HCC): Primary | ICD-10-CM

## 2024-05-02 DIAGNOSIS — Z02.89 MEDICATION MANAGEMENT CONTRACT AGREEMENT: ICD-10-CM

## 2024-05-02 DIAGNOSIS — F13.20 BENZODIAZEPINE DEPENDENCE, CONTINUOUS (HCC): ICD-10-CM

## 2024-05-02 DIAGNOSIS — F41.1 GENERALIZED ANXIETY DISORDER WITH PANIC ATTACKS: ICD-10-CM

## 2024-05-02 DIAGNOSIS — F41.0 GENERALIZED ANXIETY DISORDER WITH PANIC ATTACKS: ICD-10-CM

## 2024-05-02 DIAGNOSIS — F19.20 CONTROLLED DRUG DEPENDENCE (HCC): ICD-10-CM

## 2024-05-02 RX ORDER — DULAGLUTIDE 3 MG/.5ML
3 INJECTION, SOLUTION SUBCUTANEOUS WEEKLY
Qty: 4 ADJUSTABLE DOSE PRE-FILLED PEN SYRINGE | Refills: 5 | Status: SHIPPED | OUTPATIENT
Start: 2024-05-02

## 2024-05-02 ASSESSMENT — ENCOUNTER SYMPTOMS
BACK PAIN: 0
RHINORRHEA: 0
SINUS PRESSURE: 0
WHEEZING: 0
CONSTIPATION: 0
SHORTNESS OF BREATH: 0
TROUBLE SWALLOWING: 0
DIARRHEA: 0
BLOOD IN STOOL: 0
VOMITING: 0
NAUSEA: 0
ABDOMINAL PAIN: 0
COUGH: 0
CHEST TIGHTNESS: 0
ABDOMINAL DISTENTION: 0

## 2024-06-03 DIAGNOSIS — F41.1 GENERALIZED ANXIETY DISORDER WITH PANIC ATTACKS: ICD-10-CM

## 2024-06-03 DIAGNOSIS — Z02.89 MEDICATION MANAGEMENT CONTRACT AGREEMENT: ICD-10-CM

## 2024-06-03 DIAGNOSIS — F13.20 BENZODIAZEPINE DEPENDENCE, CONTINUOUS (HCC): ICD-10-CM

## 2024-06-03 DIAGNOSIS — F41.0 GENERALIZED ANXIETY DISORDER WITH PANIC ATTACKS: ICD-10-CM

## 2024-06-03 DIAGNOSIS — F19.20 CONTROLLED DRUG DEPENDENCE (HCC): ICD-10-CM

## 2024-06-03 RX ORDER — ALPRAZOLAM 2 MG/1
2 TABLET, EXTENDED RELEASE ORAL EVERY MORNING
Qty: 90 TABLET | Refills: 0 | Status: SHIPPED | OUTPATIENT
Start: 2024-06-03 | End: 2024-09-01

## 2024-06-03 NOTE — TELEPHONE ENCOUNTER
Pt is requesting refill:    ALPRAZolam (ALPRAZOLAM XR) 2 MG extended release tablet 90 tablet 0 3/11/2024 6/9/2024    Sig - Route: Take 1 tablet by mouth every morning for 90 days. Max Daily Amount: 2 mg - Oral      Last ordered 3/11/24    LOV:  5/2/24  NOV:  8/1/24    [address]   P:  [phone]  F:  [phone]

## 2024-06-03 NOTE — TELEPHONE ENCOUNTER
PDMP monitoring:  -PDMP (prescription drug monitoring report) was obtained and reviewed for the past one year and no indicators of drug misuse were found.   -Any other controlled substance prescriptions seen on the record have been accounted for.  I am aware of the patient receiving these medications.  -PDMP report will be re-checked prior (or during) office visit and prior to medication refill request.  -Last PDMP report reviewed on:   Last PDMP [place] as Reviewed ([place]):  Review User Review Instant Review Result   DAMON JAIMES 5/2/2024  6:04 PM Reviewed PDMP [1]     Rx sent.    Damon Jaimes MD  Family Medicine  [facility]

## 2024-06-07 DIAGNOSIS — E11.22 TYPE 2 DM WITH HYPERTENSION AND ESRD ON DIALYSIS (HCC): ICD-10-CM

## 2024-06-07 DIAGNOSIS — I12.0 TYPE 2 DM WITH HYPERTENSION AND ESRD ON DIALYSIS (HCC): ICD-10-CM

## 2024-06-07 DIAGNOSIS — Z99.2 TYPE 2 DM WITH HYPERTENSION AND ESRD ON DIALYSIS (HCC): ICD-10-CM

## 2024-06-07 DIAGNOSIS — E66.9 TYPE 2 DIABETES MELLITUS WITH OBESITY (HCC): ICD-10-CM

## 2024-06-07 DIAGNOSIS — N18.6 TYPE 2 DM WITH HYPERTENSION AND ESRD ON DIALYSIS (HCC): ICD-10-CM

## 2024-06-07 DIAGNOSIS — E11.69 TYPE 2 DIABETES MELLITUS WITH OBESITY (HCC): ICD-10-CM

## 2024-06-07 RX ORDER — DULAGLUTIDE 3 MG/.5ML
INJECTION, SOLUTION SUBCUTANEOUS
Qty: 2 ML | Refills: 5 | Status: SHIPPED | OUTPATIENT
Start: 2024-06-07

## 2024-06-07 NOTE — TELEPHONE ENCOUNTER
Medication:   Requested Prescriptions     Pending Prescriptions Disp Refills    TRULICITY 3 MG/0.5ML SOPN [Pharmacy Med Name: TRULICITY 3 MG/0.5 ML PEN] 2 mL      Sig: INJECT 3 MG UNDER THE SKIN ONCE WEEKLY     Last Filled:  5/2/24    Last appt: 5/2/2024   Next appt: 8/1/2024    Last Labs DM:   Lab Results   Component Value Date/Time    LABA1C 6.1 07/06/2023 02:50 PM

## 2024-06-23 DIAGNOSIS — G43.E09 CHRONIC MIGRAINE WITH AURA: ICD-10-CM

## 2024-06-23 DIAGNOSIS — F41.0 GENERALIZED ANXIETY DISORDER WITH PANIC ATTACKS: ICD-10-CM

## 2024-06-23 DIAGNOSIS — F41.1 GENERALIZED ANXIETY DISORDER WITH PANIC ATTACKS: ICD-10-CM

## 2024-06-24 DIAGNOSIS — F41.0 GENERALIZED ANXIETY DISORDER WITH PANIC ATTACKS: ICD-10-CM

## 2024-06-24 DIAGNOSIS — F41.1 GENERALIZED ANXIETY DISORDER WITH PANIC ATTACKS: ICD-10-CM

## 2024-06-24 RX ORDER — BUSPIRONE HYDROCHLORIDE 10 MG/1
10 TABLET ORAL 2 TIMES DAILY
Qty: 180 TABLET | Refills: 1 | Status: SHIPPED | OUTPATIENT
Start: 2024-06-24

## 2024-06-24 RX ORDER — DESVENLAFAXINE 25 MG/1
25 TABLET, EXTENDED RELEASE ORAL DAILY
Qty: 90 TABLET | Refills: 1 | Status: SHIPPED | OUTPATIENT
Start: 2024-06-24

## 2024-07-04 DIAGNOSIS — J43.9 PULMONARY EMPHYSEMA, UNSPECIFIED EMPHYSEMA TYPE (HCC): ICD-10-CM

## 2024-07-05 RX ORDER — UMECLIDINIUM BROMIDE AND VILANTEROL TRIFENATATE 62.5; 25 UG/1; UG/1
POWDER RESPIRATORY (INHALATION)
Qty: 3 EACH | Refills: 3 | Status: SHIPPED | OUTPATIENT
Start: 2024-07-05

## 2024-07-05 NOTE — TELEPHONE ENCOUNTER
LRX:1/8/2024   LOV: 5/2/2024   NOV: 8/1/2024   Last lab: 4/10/2024   Medical Necessity Information: It is in your best interest to select a reason for this procedure from the list below. All of these items fulfill various CMS LCD requirements except the new and changing color options. Medical Necessity Clause: This procedure was medically necessary because the lesion that was treated was: Lab: 0 Detail Level: Detailed Was A Bandage Applied: Yes Size Of Lesion In Cm (Required): 0.5 Depth Of Shave: dermis Biopsy Method: Dermablade Anesthesia Type: 1% lidocaine with epinephrine Hemostasis: Drysol Wound Care: Petrolatum Path Notes (To The Dermatopathologist): Please check margins Consent was obtained from the patient. The risks and benefits to therapy were discussed in detail. Specifically, the risks of infection, scarring, bleeding, prolonged wound healing, incomplete removal, allergy to anesthesia, nerve injury and recurrence were addressed. Prior to the procedure, the treatment site was clearly identified and confirmed by the patient. All components of Universal Protocol/PAUSE Rule completed. Render Post-Care Instructions In Note?: no Post-Care Instructions: I reviewed with the patient in detail post-care instructions. Patient is to keep the biopsy site dry overnight, and then apply bacitracin twice daily until healed. Patient may apply hydrogen peroxide soaks to remove any crusting. Notification Instructions: Patient will be notified of pathology results. However, patient instructed to call the office if not contacted within 2 weeks. Billing Type: Third-Party Bill

## 2024-07-16 ENCOUNTER — COMMUNITY CARE MANAGEMENT (OUTPATIENT)
Facility: CLINIC | Age: 49
End: 2024-07-16

## 2024-07-16 NOTE — PROGRESS NOTES
Patient identified by named dated birth and mailing address, disclaimer provided     VEIN MAPPING pt states she has not heard results from her vein mapping, she was to go in to have her fistula checked, states she missed this appointment because of a stomach virus and has to reschedule that.  states that she will call to find out about the vein mapping.  states the doctor already felt like her L arm veins are not any better than her R arm veins, they are narrow and deep.       pt is hoping that her R arm fistula is going to continue to mature and is going to request an u/s as she feels like it is starting to raise up and states it was placed 3 years ago.      8/1/24 PCP appointment  has another appointment with someone that helped her get her RENA back.  this was to help her get a kidney transplant. 8/8/24     pt states she has to attend her dialysis at 90% before she can be actively on the list, states she is on the list but is not active.  pt states her blood type and her age will put her high on the list.  pt states she will have a list of tests she will have to have done once she hits that 90%.  pt is not sure what her percentage of attendance with dialysis is.  pt was at 80% the last time the told her she what percentage she was at, pt recognizes this is her doing due to her anxiety attacks, panic attacks and vomiting. pt is working with her doctor to get this under control. pt has panic disorder, anxiety disorder and medical PTSD.  Suggested PD, pt states they have discussed that but she said she cannot use the equipment due to being legally blind, and her  would have to do it and it's too much more for him to take on.  pt also feels safer having a nurse do her dialysis.  pt also has concerns to having her testing done and actual transplant due to her PTSD.  pt recognizes her anxiety medication could be increased for this purpose.      encouraged pt to talk to her nephrologist about accessing her vascat

## 2024-08-01 ENCOUNTER — CARE COORDINATION (OUTPATIENT)
Dept: CARE COORDINATION | Age: 49
End: 2024-08-01

## 2024-08-01 ENCOUNTER — OFFICE VISIT (OUTPATIENT)
Dept: FAMILY MEDICINE CLINIC | Age: 49
End: 2024-08-01

## 2024-08-01 VITALS
TEMPERATURE: 97.1 F | BODY MASS INDEX: 28.41 KG/M2 | HEART RATE: 89 BPM | SYSTOLIC BLOOD PRESSURE: 150 MMHG | DIASTOLIC BLOOD PRESSURE: 88 MMHG | WEIGHT: 181 LBS | HEIGHT: 67 IN | OXYGEN SATURATION: 96 %

## 2024-08-01 DIAGNOSIS — R10.31 INGUINAL PAIN OF BOTH SIDES: ICD-10-CM

## 2024-08-01 DIAGNOSIS — Z80.0 FAMILY HISTORY OF COLON CANCER IN FATHER: ICD-10-CM

## 2024-08-01 DIAGNOSIS — E66.9 TYPE 2 DIABETES MELLITUS WITH OBESITY (HCC): ICD-10-CM

## 2024-08-01 DIAGNOSIS — F13.20 BENZODIAZEPINE DEPENDENCE, CONTINUOUS (HCC): ICD-10-CM

## 2024-08-01 DIAGNOSIS — R09.89 LABILE BLOOD PRESSURE: ICD-10-CM

## 2024-08-01 DIAGNOSIS — E78.5 TYPE 2 DIABETES MELLITUS WITH HYPERLIPIDEMIA (HCC): ICD-10-CM

## 2024-08-01 DIAGNOSIS — E11.69 TYPE 2 DIABETES MELLITUS WITH OBESITY (HCC): ICD-10-CM

## 2024-08-01 DIAGNOSIS — E11.3523 BOTH EYES AFFECTED BY PROLIFERATIVE DIABETIC RETINOPATHY WITH TRACTION RETINAL DETACHMENTS INVOLVING MACULAE, ASSOCIATED WITH TYPE 2 DIABETES MELLITUS (HCC): ICD-10-CM

## 2024-08-01 DIAGNOSIS — Z12.31 SCREENING MAMMOGRAM FOR BREAST CANCER: ICD-10-CM

## 2024-08-01 DIAGNOSIS — Z02.89 MEDICATION MANAGEMENT CONTRACT AGREEMENT: ICD-10-CM

## 2024-08-01 DIAGNOSIS — Z99.3 WHEELCHAIR DEPENDENT: ICD-10-CM

## 2024-08-01 DIAGNOSIS — R10.2 PELVIC CRAMPING: ICD-10-CM

## 2024-08-01 DIAGNOSIS — Z00.00 WELCOME TO MEDICARE PREVENTIVE VISIT: Primary | ICD-10-CM

## 2024-08-01 DIAGNOSIS — E11.22 TYPE 2 DM WITH HYPERTENSION AND ESRD ON DIALYSIS (HCC): ICD-10-CM

## 2024-08-01 DIAGNOSIS — N18.6 TYPE 2 DM WITH HYPERTENSION AND ESRD ON DIALYSIS (HCC): ICD-10-CM

## 2024-08-01 DIAGNOSIS — G43.E09 CHRONIC MIGRAINE WITH AURA WITHOUT STATUS MIGRAINOSUS, NOT INTRACTABLE: ICD-10-CM

## 2024-08-01 DIAGNOSIS — Z99.2 TYPE 2 DM WITH HYPERTENSION AND ESRD ON DIALYSIS (HCC): ICD-10-CM

## 2024-08-01 DIAGNOSIS — R10.32 INGUINAL PAIN OF BOTH SIDES: ICD-10-CM

## 2024-08-01 DIAGNOSIS — F41.1 GENERALIZED ANXIETY DISORDER WITH PANIC ATTACKS: ICD-10-CM

## 2024-08-01 DIAGNOSIS — Z12.11 COLON CANCER SCREENING: ICD-10-CM

## 2024-08-01 DIAGNOSIS — F41.0 GENERALIZED ANXIETY DISORDER WITH PANIC ATTACKS: ICD-10-CM

## 2024-08-01 DIAGNOSIS — J43.9 PULMONARY EMPHYSEMA, UNSPECIFIED EMPHYSEMA TYPE (HCC): ICD-10-CM

## 2024-08-01 DIAGNOSIS — I12.0 TYPE 2 DM WITH HYPERTENSION AND ESRD ON DIALYSIS (HCC): ICD-10-CM

## 2024-08-01 DIAGNOSIS — R60.1 GENERALIZED EDEMA: ICD-10-CM

## 2024-08-01 DIAGNOSIS — E11.69 TYPE 2 DIABETES MELLITUS WITH HYPERLIPIDEMIA (HCC): ICD-10-CM

## 2024-08-01 RX ORDER — CYCLOBENZAPRINE HCL 5 MG
5 TABLET ORAL DAILY PRN
Qty: 30 TABLET | Refills: 0 | Status: SHIPPED | OUTPATIENT
Start: 2024-08-01

## 2024-08-01 RX ORDER — TORSEMIDE 20 MG/1
20 TABLET ORAL 2 TIMES DAILY
Qty: 60 TABLET | Refills: 5 | Status: SHIPPED | OUTPATIENT
Start: 2024-08-01

## 2024-08-01 SDOH — HEALTH STABILITY: MENTAL HEALTH
STRESS IS WHEN SOMEONE FEELS TENSE, NERVOUS, ANXIOUS, OR CAN'T SLEEP AT NIGHT BECAUSE THEIR MIND IS TROUBLED. HOW STRESSED ARE YOU?: RATHER MUCH

## 2024-08-01 SDOH — ECONOMIC STABILITY: INCOME INSECURITY: HOW HARD IS IT FOR YOU TO PAY FOR THE VERY BASICS LIKE FOOD, HOUSING, MEDICAL CARE, AND HEATING?: SOMEWHAT HARD

## 2024-08-01 SDOH — ECONOMIC STABILITY: HOUSING INSECURITY: IN THE LAST 12 MONTHS, HOW MANY PLACES HAVE YOU LIVED?: 1

## 2024-08-01 SDOH — ECONOMIC STABILITY: TRANSPORTATION INSECURITY
IN THE PAST 12 MONTHS, HAS LACK OF TRANSPORTATION KEPT YOU FROM MEETINGS, WORK, OR FROM GETTING THINGS NEEDED FOR DAILY LIVING?: NO

## 2024-08-01 SDOH — ECONOMIC STABILITY: FOOD INSECURITY: WITHIN THE PAST 12 MONTHS, THE FOOD YOU BOUGHT JUST DIDN'T LAST AND YOU DIDN'T HAVE MONEY TO GET MORE.: SOMETIMES TRUE

## 2024-08-01 SDOH — ECONOMIC STABILITY: FOOD INSECURITY: WITHIN THE PAST 12 MONTHS, YOU WORRIED THAT YOUR FOOD WOULD RUN OUT BEFORE YOU GOT MONEY TO BUY MORE.: OFTEN TRUE

## 2024-08-01 SDOH — ECONOMIC STABILITY: TRANSPORTATION INSECURITY
IN THE PAST 12 MONTHS, HAS THE LACK OF TRANSPORTATION KEPT YOU FROM MEDICAL APPOINTMENTS OR FROM GETTING MEDICATIONS?: NO

## 2024-08-01 SDOH — HEALTH STABILITY: PHYSICAL HEALTH: ON AVERAGE, HOW MANY DAYS PER WEEK DO YOU ENGAGE IN MODERATE TO STRENUOUS EXERCISE (LIKE A BRISK WALK)?: 0 DAYS

## 2024-08-01 SDOH — ECONOMIC STABILITY: INCOME INSECURITY: HOW HARD IS IT FOR YOU TO PAY FOR THE VERY BASICS LIKE FOOD, HOUSING, MEDICAL CARE, AND HEATING?: HARD

## 2024-08-01 SDOH — ECONOMIC STABILITY: FOOD INSECURITY: WITHIN THE PAST 12 MONTHS, YOU WORRIED THAT YOUR FOOD WOULD RUN OUT BEFORE YOU GOT MONEY TO BUY MORE.: SOMETIMES TRUE

## 2024-08-01 SDOH — ECONOMIC STABILITY: FOOD INSECURITY: WITHIN THE PAST 12 MONTHS, THE FOOD YOU BOUGHT JUST DIDN'T LAST AND YOU DIDN'T HAVE MONEY TO GET MORE.: OFTEN TRUE

## 2024-08-01 SDOH — ECONOMIC STABILITY: INCOME INSECURITY: IN THE LAST 12 MONTHS, WAS THERE A TIME WHEN YOU WERE NOT ABLE TO PAY THE MORTGAGE OR RENT ON TIME?: NO

## 2024-08-01 SDOH — HEALTH STABILITY: PHYSICAL HEALTH: ON AVERAGE, HOW MANY MINUTES DO YOU ENGAGE IN EXERCISE AT THIS LEVEL?: 0 MIN

## 2024-08-01 ASSESSMENT — PATIENT HEALTH QUESTIONNAIRE - PHQ9
SUM OF ALL RESPONSES TO PHQ QUESTIONS 1-9: 7
3. TROUBLE FALLING OR STAYING ASLEEP: NEARLY EVERY DAY
SUM OF ALL RESPONSES TO PHQ QUESTIONS 1-9: 7
1. LITTLE INTEREST OR PLEASURE IN DOING THINGS: SEVERAL DAYS
5. POOR APPETITE OR OVEREATING: NOT AT ALL
SUM OF ALL RESPONSES TO PHQ QUESTIONS 1-9: 7
SUM OF ALL RESPONSES TO PHQ QUESTIONS 1-9: 7
4. FEELING TIRED OR HAVING LITTLE ENERGY: SEVERAL DAYS
9. THOUGHTS THAT YOU WOULD BE BETTER OFF DEAD, OR OF HURTING YOURSELF: NOT AT ALL
SUM OF ALL RESPONSES TO PHQ9 QUESTIONS 1 & 2: 2
7. TROUBLE CONCENTRATING ON THINGS, SUCH AS READING THE NEWSPAPER OR WATCHING TELEVISION: NOT AT ALL
10. IF YOU CHECKED OFF ANY PROBLEMS, HOW DIFFICULT HAVE THESE PROBLEMS MADE IT FOR YOU TO DO YOUR WORK, TAKE CARE OF THINGS AT HOME, OR GET ALONG WITH OTHER PEOPLE: SOMEWHAT DIFFICULT
8. MOVING OR SPEAKING SO SLOWLY THAT OTHER PEOPLE COULD HAVE NOTICED. OR THE OPPOSITE, BEING SO FIGETY OR RESTLESS THAT YOU HAVE BEEN MOVING AROUND A LOT MORE THAN USUAL: NOT AT ALL
2. FEELING DOWN, DEPRESSED OR HOPELESS: SEVERAL DAYS

## 2024-08-01 ASSESSMENT — ENCOUNTER SYMPTOMS
BLOOD IN STOOL: 0
TROUBLE SWALLOWING: 0
COUGH: 0
RHINORRHEA: 0
SHORTNESS OF BREATH: 0
VOMITING: 0
DIARRHEA: 0
BACK PAIN: 0
NAUSEA: 0
ABDOMINAL PAIN: 0
WHEEZING: 0
SINUS PRESSURE: 0
CHEST TIGHTNESS: 0
ABDOMINAL DISTENTION: 0
CONSTIPATION: 0

## 2024-08-01 NOTE — PATIENT INSTRUCTIONS
instructions adapted under license by [facility] [employer]. If you have questions about a medical condition or this instruction, always ask your healthcare professional. [employer]wise, [employer] disclaims any warranty or liability for your use of this information.      Personalized Preventive Plan for Kristine Ramirez - 8/1/2024  Medicare offers a range of preventive health benefits. Some of the tests and screenings are paid in full while other may be subject to a deductible, co-insurance, and/or copay.    Some of these benefits include a comprehensive review of your medical history including lifestyle, illnesses that may run in your family, and various assessments and screenings as appropriate.    After reviewing your medical record and screening and assessments performed today your provider may have ordered immunizations, labs, imaging, and/or referrals for you.  A list of these orders (if applicable) as well as your Preventive Care list are included within your After Visit Summary for your review.    Other Preventive Recommendations:    A preventive eye exam performed by an eye specialist is recommended every 1-2 years to screen for glaucoma; cataracts, macular degeneration, and other eye disorders.  A preventive dental visit is recommended every 6 months.  Try to get at least 150 minutes of exercise per week or 10,000 steps per day on a pedometer .  Order or download the FREE \"Exercise & Physical Activity: Your Everyday Guide\" from [employer] [place] [employer]. Call 1-572.714.6386 or search [employer] [place] [employer] online.  You need 0382-3695 mg of calcium and 1507-5895 IU of vitamin D per day. It is possible to meet your calcium requirement with diet alone, but a vitamin D supplement is usually necessary to meet this goal.  When exposed to the sun, use a sunscreen that protects against both UVA and UVB radiation with an SPF of 30 or greater. Reapply every 2 to 3 hours or after sweating, drying off with a towel,

## 2024-08-01 NOTE — CARE COORDINATION
Ambulatory Care Coordination Note     2024 1:32 PM     Patient Current Location:   in the PCP office today.      This patient was received as a referral from Ambulatory Care Manager .    Patient contacted the patient by telephone. Verified name and  with patient as identifiers. Provided introduction to self, and explanation of the ACM role.   Patient accepted care management services at this time.          ACM: Sujatha Shepard RN     Challenges to be reviewed by the provider   Additional needs identified to be addressed with provider No  none               Method of communication with provider: none.    Care Summary Note: ACM met with pt in the office today. She and her  were both present. At this time, their biggest concern is having enough food each month. They live at home with their cat. They are have access to transportation, and an apartment to live in. We discussed several local organizations/resources such as [employer], [facility] and a referral to [employer] ([facility]) due to pt stating she is legally blind. Pt does not have reliable wifi at home so we will print out a list of local food mcdaniel, or other food resources to mail to pt home address. Confirmed home address listed in chart is correct with pt today. Will send the referral to [facility] through [facility] to see if they are able to offer meals to pt, or any other additional services. Referring pt to [facility] for the same reason. We discussed pt may be eligible to sign up for care management through her insurance company to see if they are able to offer any additional resources, possibly a voucher or additional food resources. During the visit today, we signed pt  up for [employer], an organization that will take a pan of [employer] to a home once a month if needed. Pt and  were in agreement for all of these resources at this time.      Pt stated she goes to [facility], 1015 AM. She said

## 2024-08-01 NOTE — PROGRESS NOTES
-Medicare Annual Wellness Visit-    Name: Kristine Ramirez Today’s Date: 2024   MRN: [id number] Sex: Female   Age: 49 y.o. Ethnicity: Non- / Non    : 1975 Race: White (non-)      Kristine Ramirez is here for Medicare AWV, Diabetes, Hypertension, and Anxiety    Screenings for behavioral, psychosocial and functional/safety risks, and cognitive dysfunction are all negative except as indicated below. These results, as well as other patient data from the Health Risk Assessment form, are documented in Flowsheets linked to this Encounter.    Chief Complaint   Patient presents with    Medicare AWV    Diabetes    Hypertension    Anxiety     Other issues discussed during today's visit:    Interval health history:    [] Patient reported no new health issues or concerns.      [x] Chronic health issues are overall stable on current medical regiment (listed below).    Anxiety/depression:  -Meds tried: Sertraline, Fluoxetine, Fluvoxamine, Xanax, Bupropion, Alprazolam, Viibryd.  -GeneSight test done in Dec 2021.     Updates:  -No recent stressors of note other than stress/anxiety over her health issues given that she has ESRD on dialysis.  She reported that she has been compliant with attending her dialysis sessions.  -She reported that Buspirone 10 mg BID was effective with treating her issues of generalized anxiety.  She fortunately had no side effects with taking it along with her Desvenlafaxine XR and Alprazolam CR on a daily basis.  -Patient reported that she enjoyed her holidays with her  and they stayed at home.     Chronic migraines:  -Meds tried: Tylenol, Ajovy, Qulipta (started around 2022). Contraindication for NSAID use because of ESRD.     Updates:  -She has been on Qulipita since around Dec 2022/2023 - she stated use to have migraines 5 days a week and now 2 days a week and are less severe, but duration is the same (about 12-24 hrs).  -She use to have \"20 migraines a

## 2024-08-02 ENCOUNTER — CARE COORDINATION (OUTPATIENT)
Dept: CARE COORDINATION | Age: 49
End: 2024-08-02

## 2024-08-02 NOTE — CARE COORDINATION
Per AC- I've called [facility] twice this morning and when both representatives tried to connect me with the  dept, the line transferred, received a recording to call back, then disconnected. Would you care to try to call [facility] to refer pt to CM with [facility]? She has a debit card for groceries she is unable to use. I think if she is connected with , they may be able to assist her with this in addition to having their SW help her with additional resources. Usually pt has to be in  for this additional assistance though.     The following contact information was sent to [facility] as well.      Nilda Monahan, [facility] Liaison-   [employer] [facility], [facility]   [email or web address]   Direct line: [phone]    OUT of Office on FRIDAYS. Please reach out to? [email or web address] in my absence for any immediate needs    Called [facility]- [phone] representative provided the direct number to Case Management and transferred CCSS, but call disconnected.    [facility] Case Management- [phone]  This number takes you through the same provider prompts. Another agent tried different departments as well with  She provided case management : [email or web address]      Sent secure email to above with CC to [facility]

## 2024-08-02 NOTE — CARE COORDINATION
ACM attempted to refer pt to [facility] twice. Both times ACM called [facility] and requested CM through member side and provider side. Both times, representatives transferred ACM to the CM dept, a recording stated \"please call back another time\" and the line was disconnected.     ACM to request assistance with [facility] at this time.

## 2024-08-02 NOTE — CARE COORDINATION
Referral confirmed for [facility]:    We received your referral for Kristine Ramirez on 08/02/2024. Your reference number is [id number]    Staff in our Aging and Disability Resource Center will respond directly to Sujatha Shepard (or another contact you designated on the form) within 1-2 business days. If you requested to receive an update regarding this referral, a [facility] staff member will contact you within 5 business days.    You cannot reply to this email message. If you need to contact us regarding this referral, please contact our Call Center at [phone] or [phone].

## 2024-08-05 NOTE — CARE COORDINATION
Received email response back from [facility] Case [facility] -  This member is MMP.  MHOHRCMReferrals is for Medicaid members only.  Please send your request to the assigned  listed in [employer] (Nilda Marie).    CCSS asked them to please supply contact infor for Nilda Marie.    Routed to [facility]

## 2024-08-06 NOTE — CARE COORDINATION
Received email back from [facility] Case Management regarding  Nilda Marie's email address.  [email or web address]     Email sent to Nilda regarding patient needs.  Routed to [facility] who is also Ccd on secure email

## 2024-08-07 ENCOUNTER — CARE COORDINATION (OUTPATIENT)
Dept: CARE COORDINATION | Age: 49
End: 2024-08-07

## 2024-08-07 NOTE — CARE COORDINATION
ACM attempted to f/u with pt and her  today, however they were UTR.     Will make another outreach attempt at a later date/time.

## 2024-08-19 ENCOUNTER — CARE COORDINATION (OUTPATIENT)
Dept: CARE COORDINATION | Age: 49
End: 2024-08-19

## 2024-08-19 NOTE — CARE COORDINATION
NELY attempted to f/u with patient today. She answered the phone and stated she was at dialysis, but she was throwing up. She had a nurse there with her. NELY informed her I will call her another time and stated I hope she feels better. Pt thanked NELY and agreed with a call another time.

## 2024-08-20 ENCOUNTER — CARE COORDINATION (OUTPATIENT)
Dept: CARE COORDINATION | Age: 49
End: 2024-08-20

## 2024-08-20 NOTE — DISCHARGE INSTR - COC
Continuity of Care Form    Patient Name: Tyler Villagomez   :  1975  MRN:  [id number]    Admit date:  2021  Discharge date:  21    Code Status Order: Full Code   Advance Directives:      Admitting Physician:  Jean-Paul Jones MD  PCP: Kristofer Small    Discharging Nurse: Irene Park Unit/Room#: 2VL-4585/4412-98  Discharging Unit Phone Number: ***    Emergency Contact:   Extended Emergency Contact Information  Primary Emergency Contact: [address] Phone: [phone]  Relation: Spouse    Past Surgical History:  Past Surgical History:   Procedure Laterality Date    CERVIX SURGERY      laser tx for dysplasia;    EYE SURGERY      FOOT SURGERY Right     FOOT SURGERY Bilateral     FOOT SURGERY Left     IR TUNNELED CATHETER PLACEMENT GREATER THAN 5 YEARS  2021    IR TUNNELED CATHETER PLACEMENT GREATER THAN 5 YEARS 2021 Nancy Sutton MD [facility] SPECIAL PROCEDURES       Immunization History:   Immunization History   Administered Date(s) Administered    Influenza 2011    Influenza Virus Vaccine 2011, 10/05/2013    Tdap (Boostrix, Adacel) 2018       Active Problems:  Patient Active Problem List   Diagnosis Code    Type 2 diabetes mellitus, with long-term current use of insulin ([facility] E11.9, Z79.4    Mixed hyperlipidemia E78.2    Migraine headache G43.909    Anemia D64.9    Diabetic foot infection ([address] E11.628, L08.9    Pyogenic inflammation of bone ([facility] M86.9    History of medication noncompliance Z91.14    Osteomyelitis of left foot ([address] M86.9    Nephrotic syndrome N04.9    Peripheral edema R60.9    Pulmonary edema J81.1    Right sided numbness R20.0    Tobacco dependence F17.200    Chronic kidney disease (CKD), stage III (moderate) (HCC) N18.30    Chronic diastolic (congestive) heart failure (HCC) I50.32    History of CVA (cerebrovascular accident) Z86.73    Arterial ischemic stroke, ICA, left, acute ([facility] P58.634    See Telephone Note.    HTN (hypertension), benign I10    DM (diabetes mellitus), secondary, uncontrolled, w/neurologic complic ([facility]) B56.49, V76.33    Dyslipidemia E78.5    Smoker F17.200    Panic disorder F41.0    Isolated proteinuria R80.0    Acute on chronic diastolic heart failure ([facility]) I50.33    Diabetic peripheral neuropathy ([facility]) E11.42    Acute respiratory failure ([facility]) J96.00    Depression F32. 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, associated with type 2 diabetes mellitus ([facility] S14.7971    Cellulitis of right foot L03.115    Hidradenitis suppurativa L73.2    Hypocalcemia E83.51    Non-toxic multinodular goiter E04.2    Polyneuropathy due to type 2 diabetes mellitus ([facility] E11.42    Proliferative diabetic retinopathy associated with type 2 diabetes mellitus ([facility] E11.3599    ESRD (end stage renal disease) ([facility] N18.6    Acute respiratory failure with hypoxemia ([facility]) J96.01    Acute respiratory failure due to COVID-19 ([facility] U07.1, J96.00    Suspected COVID-19 virus infection Z20.822    Epiglottitis J05.10       Isolation/Infection:   Isolation            No Isolation          Patient Infection Status       Infection Onset Added Last Indicated Last Indicated By Review Planned Expiration Resolved Resolved By    None active    Resolved    COVID-19 Rule Out 10/16/21 10/16/21 10/17/21 COVID-19 (Ordered)   10/17/21 Rule-Out Test Resulted    COVID-19 Rule Out 10/04/21 10/04/21 10/04/21 COVID-19 (Ordered)   10/05/21 Rule-Out Test Resulted    COVID-19 Rule Out 08/27/21 08/27/21 08/27/21 COVID-19 (Ordered)   08/28/21 Rule-Out Test Resulted    COVID-19 Rule Out 02/23/21 02/23/21 02/23/21 COVID-19, Rapid (Ordered)   02/23/21 Rule-Out Test Resulted    MRSA 04/22/19 04/25/19 04/22/19 Wound Culture   08/29/20 Laban Fernando, RN            Nurse Assessment:  Last Vital Signs: BP (!) 158/73   Pulse 89   Temp 97.8 °F (36.6 °C) (Oral)   Resp 18   Ht 5' 7\" (1.702 m)   Wt 183 lb 1.6 oz (83.1 kg)   LMP 10/14/2021   SpO2 96%   BMI 28.68 kg/m²     Last documented pain score (0-10 scale): Pain Level: 0  Last Weight:   Wt Readings from Last 1 Encounters:   11/05/21 183 lb 1.6 oz (83.1 kg)     Mental Status:  oriented and alert    IV Access:  - None    Nursing Mobility/ADLs:  Walking   Assisted  Transfer  Assisted  Bathing  Assisted  Dressing  Assisted  Toileting  Assisted  Feeding  Assisted  Med Admin  Assisted  Med Delivery   whole    Wound Care Documentation and Therapy:  Incision 10/05/13 Other (Comment) Left (Active)   Number of days: 2953        Elimination:  Continence:   · Bowel: No  · Bladder: No  Urinary Catheter: None   Colostomy/Ileostomy/Ileal Conduit: No       Date of Last BM: n/a    Intake/Output Summary (Last 24 hours) at 11/5/2021 0820  Last data filed at 11/4/2021 1015  Gross per 24 hour   Intake 240 ml   Output --   Net 240 ml     I/O last 3 completed shifts: In: 240 [P.O.:240]  Out: -     Safety Concerns:     History of Falls (last 30 days) and At Risk for Falls    Impairments/Disabilities:      Vision    Nutrition Therapy:  Current Nutrition Therapy:   - Oral Diet:  General    Routes of Feeding: Oral  Liquids: No Restrictions  Daily Fluid Restriction: no  Last Modified Barium Swallow with Video (Video Swallowing Test): not done    Treatments at the Time of Hospital Discharge:   Respiratory Treatments: inhalers  Oxygen Therapy:  is not on home oxygen therapy.   Ventilator:    - No ventilator support    Rehab Therapies: Physical Therapy and Occupational Therapy  Weight Bearing Status/Restrictions: No weight bearing restirctions  Other Medical Equipment (for information only, NOT a DME order):  walker  Other Treatments: none    Patient's personal belongings (please select all that are sent with patient):  cell phone, clothing     RN SIGNATURE:  Electronically signed by Sylvia Garcia RN on 11/5/21 at 5:11 PM EDT    CASE MANAGEMENT/SOCIAL WORK SECTION    Inpatient Status Date: ***    Readmission Risk Assessment Score:  Readmission Risk              Risk of Unplanned Readmission:  39           Discharging to Facility/ Agency   · Name:   · Address:  · Phone:  · Fax:    Dialysis Facility (if applicable)   Name:  [facility]; [phone]  Fax;  [phone]  On  MW at 12.15 pm  · Spouse transports her to & from HD treatments        / signature: Electronically signed by Lauren Burdick RN on 11/5/21 at 2:29 PM EDT    PHYSICIAN SECTION    Prognosis: Fair    Condition at Discharge: Stable    Rehab Potential (if transferring to Rehab): Fair    Recommended Labs or Other Treatments After Discharge: ***    Physician Certification: I certify the above information and transfer of Elena Moe  is necessary for the continuing treatment of the diagnosis listed and that she requires Henry Amor for greater 30 days.      Update Admission H&P: No change in H&P    PHYSICIAN SIGNATURE:  Electronically signed by Mouna Dodd MD on 11/5/21 at 8:20 AM EDT

## 2024-08-22 SDOH — ECONOMIC STABILITY: FOOD INSECURITY: WITHIN THE PAST 12 MONTHS, THE FOOD YOU BOUGHT JUST DIDN'T LAST AND YOU DIDN'T HAVE MONEY TO GET MORE.: OFTEN TRUE

## 2024-08-22 SDOH — ECONOMIC STABILITY: INCOME INSECURITY: IN THE LAST 12 MONTHS, WAS THERE A TIME WHEN YOU WERE NOT ABLE TO PAY THE MORTGAGE OR RENT ON TIME?: NO

## 2024-08-22 SDOH — HEALTH STABILITY: PHYSICAL HEALTH: ON AVERAGE, HOW MANY MINUTES DO YOU ENGAGE IN EXERCISE AT THIS LEVEL?: 0 MIN

## 2024-08-22 SDOH — HEALTH STABILITY: MENTAL HEALTH
STRESS IS WHEN SOMEONE FEELS TENSE, NERVOUS, ANXIOUS, OR CAN'T SLEEP AT NIGHT BECAUSE THEIR MIND IS TROUBLED. HOW STRESSED ARE YOU?: VERY MUCH

## 2024-08-22 SDOH — HEALTH STABILITY: PHYSICAL HEALTH: ON AVERAGE, HOW MANY DAYS PER WEEK DO YOU ENGAGE IN MODERATE TO STRENUOUS EXERCISE (LIKE A BRISK WALK)?: 0 DAYS

## 2024-08-22 SDOH — ECONOMIC STABILITY: FOOD INSECURITY: WITHIN THE PAST 12 MONTHS, YOU WORRIED THAT YOUR FOOD WOULD RUN OUT BEFORE YOU GOT MONEY TO BUY MORE.: OFTEN TRUE

## 2024-08-22 SDOH — ECONOMIC STABILITY: INCOME INSECURITY: HOW HARD IS IT FOR YOU TO PAY FOR THE VERY BASICS LIKE FOOD, HOUSING, MEDICAL CARE, AND HEATING?: HARD

## 2024-08-22 NOTE — CARE COORDINATION
Ambulatory Care Coordination Note     2024 8:51 AM     Patient Current Location:  Home: [address]  [address]  [place] [address]     ACM contacted the patient by telephone. Verified name and  with patient as identifiers.         ACM: Sujatha Shepard RN     Challenges to be reviewed by the provider   Additional needs identified to be addressed with provider No  none               Method of communication with provider: none.    Care Summary Note: ACM f/u with patient today. She stated she was no longer nauseated today. We reviewed the resources discussed in the office a few weeks ago. She confirmed they did hear from [employer], however they have to wait until there is a  available to make a vickia. She said they did not receive the mailings with resources sent to them yet but will continue to look check their mail. ACM informed pt that in order to refer to [facility], we would need to send her most recent eye doctor appointment visit notes to them. Pt stated she hasn't been back to the eye doctor in a few years since they told her she was legally blind and she can't remember who she saw at that time, but they were in [place]. ACM informed her if she remembers, I'll be happy to reach out to their office to see if they can send the OV notes to me or [facility] with a referral. Pt also stated she did hear form someone at [facility], but they did not discuss the OTC card with her. ACM will reach out to Nilda, listed in pt chart as a contact for pt at [facility] to see if she can call pt again, or provide ACM with her contact number to give to pt. Pt agreed with this plan. Will f/u with her again at a later date/time.     Offered patient enrollment in the Remote Patient Monitoring (RPM) program for in-home monitoring: Deferred at this time because monitoring at dialysis; will discuss at next outreach.     Assessments Completed:   General Assessment    Do you have any symptoms that are causing concern?: No       and

## 2024-08-22 NOTE — CARE COORDINATION
Sent email to Nilda at [facility] for an update regarding pt and her needs. Will update pt when hear back from Nilda.

## 2024-08-23 DIAGNOSIS — N18.6 TYPE 2 DM WITH HYPERTENSION AND ESRD ON DIALYSIS (HCC): ICD-10-CM

## 2024-08-23 DIAGNOSIS — Z99.2 TYPE 2 DM WITH HYPERTENSION AND ESRD ON DIALYSIS (HCC): ICD-10-CM

## 2024-08-23 DIAGNOSIS — E11.22 TYPE 2 DM WITH HYPERTENSION AND ESRD ON DIALYSIS (HCC): ICD-10-CM

## 2024-08-23 DIAGNOSIS — I12.0 TYPE 2 DM WITH HYPERTENSION AND ESRD ON DIALYSIS (HCC): ICD-10-CM

## 2024-08-23 RX ORDER — CLONIDINE HYDROCHLORIDE 0.2 MG/1
TABLET ORAL
Qty: 270 TABLET | Refills: 1 | Status: SHIPPED | OUTPATIENT
Start: 2024-08-23

## 2024-08-23 NOTE — TELEPHONE ENCOUNTER
Last OV: 8/1/2024  Next OV: 10/31/2024    Next appointment due:11/01/24    Last fill:02/27/24   Refills: 1

## 2024-08-29 ENCOUNTER — APPOINTMENT (OUTPATIENT)
Dept: CT IMAGING | Age: 49
End: 2024-08-29
Payer: COMMERCIAL

## 2024-08-29 ENCOUNTER — HOSPITAL ENCOUNTER (EMERGENCY)
Age: 49
Discharge: ANOTHER HOSPITAL | End: 2024-08-30
Attending: EMERGENCY MEDICINE
Payer: COMMERCIAL

## 2024-08-29 ENCOUNTER — APPOINTMENT (OUTPATIENT)
Dept: ULTRASOUND IMAGING | Age: 49
End: 2024-08-29
Payer: COMMERCIAL

## 2024-08-29 DIAGNOSIS — N18.6 ESRD ON HEMODIALYSIS (HCC): ICD-10-CM

## 2024-08-29 DIAGNOSIS — R10.9 ACUTE ABDOMINAL PAIN: Primary | ICD-10-CM

## 2024-08-29 DIAGNOSIS — R18.8 OTHER ASCITES: ICD-10-CM

## 2024-08-29 DIAGNOSIS — Z99.2 ESRD ON HEMODIALYSIS (HCC): ICD-10-CM

## 2024-08-29 LAB
ALBUMIN SERPL-MCNC: 3.2 G/DL (ref 3.4–5)
ALBUMIN/GLOB SERPL: 0.8 {RATIO} (ref 1.1–2.2)
ALP SERPL-CCNC: 97 U/L (ref 40–129)
ALT SERPL-CCNC: <5 U/L (ref 10–40)
ANION GAP SERPL CALCULATED.3IONS-SCNC: 22 MMOL/L (ref 3–16)
AST SERPL-CCNC: 10 U/L (ref 15–37)
B-HCG SERPL EIA 3RD IS-ACNC: 9 MIU/ML
BASOPHILS # BLD: 0 K/UL (ref 0–0.2)
BASOPHILS NFR BLD: 0 %
BILIRUB SERPL-MCNC: 0.5 MG/DL (ref 0–1)
BUN SERPL-MCNC: 60 MG/DL (ref 7–20)
CALCIUM SERPL-MCNC: 8.5 MG/DL (ref 8.3–10.6)
CHLORIDE SERPL-SCNC: 84 MMOL/L (ref 99–110)
CO2 SERPL-SCNC: 25 MMOL/L (ref 21–32)
CREAT SERPL-MCNC: 9.5 MG/DL (ref 0.6–1.1)
DEPRECATED RDW RBC AUTO: 16.6 % (ref 12.4–15.4)
EKG ATRIAL RATE: 103 BPM
EKG DIAGNOSIS: NORMAL
EKG P AXIS: 21 DEGREES
EKG P-R INTERVAL: 112 MS
EKG Q-T INTERVAL: 336 MS
EKG QRS DURATION: 78 MS
EKG QTC CALCULATION (BAZETT): 440 MS
EKG R AXIS: 22 DEGREES
EKG T AXIS: 134 DEGREES
EKG VENTRICULAR RATE: 103 BPM
EOSINOPHIL # BLD: 0.1 K/UL (ref 0–0.6)
EOSINOPHIL NFR BLD: 1 %
GFR SERPLBLD CREATININE-BSD FMLA CKD-EPI: 5 ML/MIN/{1.73_M2}
GLUCOSE SERPL-MCNC: 135 MG/DL (ref 70–99)
HCG SERPL QL: POSITIVE
HCT VFR BLD AUTO: 34 % (ref 36–48)
HGB BLD-MCNC: 11.4 G/DL (ref 12–16)
INR PPP: 1.13 (ref 0.85–1.15)
LACTATE BLDV-SCNC: 1.1 MMOL/L (ref 0.4–1.9)
LIPASE SERPL-CCNC: 24 U/L (ref 13–60)
LYMPHOCYTES # BLD: 0.6 K/UL (ref 1–5.1)
LYMPHOCYTES NFR BLD: 9 %
MCH RBC QN AUTO: 27.7 PG (ref 26–34)
MCHC RBC AUTO-ENTMCNC: 33.4 G/DL (ref 31–36)
MCV RBC AUTO: 82.8 FL (ref 80–100)
MONOCYTES # BLD: 0.4 K/UL (ref 0–1.3)
MONOCYTES NFR BLD: 6 %
NEUTROPHILS # BLD: 5.5 K/UL (ref 1.7–7.7)
NEUTROPHILS NFR BLD: 84 %
NT-PROBNP SERPL-MCNC: ABNORMAL PG/ML (ref 0–124)
PLATELET # BLD AUTO: 218 K/UL (ref 135–450)
PLATELET BLD QL SMEAR: ADEQUATE
PMV BLD AUTO: 7.3 FL (ref 5–10.5)
POTASSIUM SERPL-SCNC: 4.3 MMOL/L (ref 3.5–5.1)
PROCALCITONIN SERPL IA-MCNC: 0.71 NG/ML (ref 0–0.15)
PROT SERPL-MCNC: 7.2 G/DL (ref 6.4–8.2)
PROTHROMBIN TIME: 14.8 SEC (ref 11.9–14.9)
RBC # BLD AUTO: 4.11 M/UL (ref 4–5.2)
RBC MORPH BLD: NORMAL
SLIDE REVIEW: ABNORMAL
SODIUM SERPL-SCNC: 131 MMOL/L (ref 136–145)
TROPONIN, HIGH SENSITIVITY: 541 NG/L (ref 0–14)
WBC # BLD AUTO: 6.6 K/UL (ref 4–11)

## 2024-08-29 PROCEDURE — 93975 VASCULAR STUDY: CPT

## 2024-08-29 PROCEDURE — 80053 COMPREHEN METABOLIC PANEL: CPT

## 2024-08-29 PROCEDURE — 76705 ECHO EXAM OF ABDOMEN: CPT

## 2024-08-29 PROCEDURE — 99285 EMERGENCY DEPT VISIT HI MDM: CPT

## 2024-08-29 PROCEDURE — 83605 ASSAY OF LACTIC ACID: CPT

## 2024-08-29 PROCEDURE — 83880 ASSAY OF NATRIURETIC PEPTIDE: CPT

## 2024-08-29 PROCEDURE — 85025 COMPLETE CBC W/AUTO DIFF WBC: CPT

## 2024-08-29 PROCEDURE — 6360000004 HC RX CONTRAST MEDICATION: Performed by: EMERGENCY MEDICINE

## 2024-08-29 PROCEDURE — 76830 TRANSVAGINAL US NON-OB: CPT

## 2024-08-29 PROCEDURE — 85610 PROTHROMBIN TIME: CPT

## 2024-08-29 PROCEDURE — 84484 ASSAY OF TROPONIN QUANT: CPT

## 2024-08-29 PROCEDURE — 83690 ASSAY OF LIPASE: CPT

## 2024-08-29 PROCEDURE — 74177 CT ABD & PELVIS W/CONTRAST: CPT

## 2024-08-29 PROCEDURE — 96376 TX/PRO/DX INJ SAME DRUG ADON: CPT

## 2024-08-29 PROCEDURE — 80074 ACUTE HEPATITIS PANEL: CPT

## 2024-08-29 PROCEDURE — 84145 PROCALCITONIN (PCT): CPT

## 2024-08-29 PROCEDURE — 84702 CHORIONIC GONADOTROPIN TEST: CPT

## 2024-08-29 PROCEDURE — 6360000002 HC RX W HCPCS: Performed by: EMERGENCY MEDICINE

## 2024-08-29 PROCEDURE — 96374 THER/PROPH/DIAG INJ IV PUSH: CPT

## 2024-08-29 PROCEDURE — 84703 CHORIONIC GONADOTROPIN ASSAY: CPT

## 2024-08-29 PROCEDURE — 96375 TX/PRO/DX INJ NEW DRUG ADDON: CPT

## 2024-08-29 RX ORDER — FENTANYL CITRATE 50 UG/ML
50 INJECTION, SOLUTION INTRAMUSCULAR; INTRAVENOUS
Status: COMPLETED | OUTPATIENT
Start: 2024-08-29 | End: 2024-08-29

## 2024-08-29 RX ORDER — FENTANYL CITRATE 50 UG/ML
50 INJECTION, SOLUTION INTRAMUSCULAR; INTRAVENOUS ONCE
Status: DISCONTINUED | OUTPATIENT
Start: 2024-08-29 | End: 2024-08-30 | Stop reason: HOSPADM

## 2024-08-29 RX ORDER — ONDANSETRON 2 MG/ML
4 INJECTION INTRAMUSCULAR; INTRAVENOUS ONCE
Status: COMPLETED | OUTPATIENT
Start: 2024-08-29 | End: 2024-08-29

## 2024-08-29 RX ORDER — IOPAMIDOL 755 MG/ML
75 INJECTION, SOLUTION INTRAVASCULAR
Status: COMPLETED | OUTPATIENT
Start: 2024-08-29 | End: 2024-08-29

## 2024-08-29 RX ADMIN — ONDANSETRON 4 MG: 2 INJECTION INTRAMUSCULAR; INTRAVENOUS at 19:03

## 2024-08-29 RX ADMIN — FENTANYL CITRATE 50 MCG: 50 INJECTION INTRAMUSCULAR; INTRAVENOUS at 23:03

## 2024-08-29 RX ADMIN — IOPAMIDOL 75 ML: 755 INJECTION, SOLUTION INTRAVENOUS at 19:31

## 2024-08-29 RX ADMIN — FENTANYL CITRATE 50 MCG: 50 INJECTION INTRAMUSCULAR; INTRAVENOUS at 19:03

## 2024-08-29 ASSESSMENT — PAIN DESCRIPTION - DESCRIPTORS: DESCRIPTORS: CRAMPING;DISCOMFORT

## 2024-08-29 ASSESSMENT — PAIN SCALES - GENERAL
PAINLEVEL_OUTOF10: 8
PAINLEVEL_OUTOF10: 7

## 2024-08-29 ASSESSMENT — PAIN DESCRIPTION - LOCATION
LOCATION: ABDOMEN
LOCATION: ABDOMEN;PELVIS;BACK

## 2024-08-30 ENCOUNTER — HOSPITAL ENCOUNTER (INPATIENT)
Age: 49
LOS: 1 days | Discharge: HOME | DRG: 699 | End: 2024-08-31
Attending: INTERNAL MEDICINE | Admitting: INTERNAL MEDICINE
Payer: COMMERCIAL

## 2024-08-30 ENCOUNTER — APPOINTMENT (OUTPATIENT)
Dept: ULTRASOUND IMAGING | Age: 49
DRG: 699 | End: 2024-08-30
Attending: INTERNAL MEDICINE
Payer: COMMERCIAL

## 2024-08-30 ENCOUNTER — APPOINTMENT (OUTPATIENT)
Age: 49
DRG: 699 | End: 2024-08-30
Attending: HOSPITALIST
Payer: COMMERCIAL

## 2024-08-30 VITALS
OXYGEN SATURATION: 93 % | RESPIRATION RATE: 14 BRPM | TEMPERATURE: 98.1 F | SYSTOLIC BLOOD PRESSURE: 159 MMHG | BODY MASS INDEX: 27.78 KG/M2 | DIASTOLIC BLOOD PRESSURE: 86 MMHG | HEIGHT: 67 IN | WEIGHT: 177 LBS | HEART RATE: 101 BPM

## 2024-08-30 DIAGNOSIS — F13.20 BENZODIAZEPINE DEPENDENCE, CONTINUOUS (HCC): ICD-10-CM

## 2024-08-30 DIAGNOSIS — F41.0 GENERALIZED ANXIETY DISORDER WITH PANIC ATTACKS: ICD-10-CM

## 2024-08-30 DIAGNOSIS — F41.1 GENERALIZED ANXIETY DISORDER WITH PANIC ATTACKS: ICD-10-CM

## 2024-08-30 DIAGNOSIS — E11.42 TYPE 2 DIABETES MELLITUS WITH DIABETIC POLYNEUROPATHY, WITH LONG-TERM CURRENT USE OF INSULIN (HCC): ICD-10-CM

## 2024-08-30 DIAGNOSIS — R18.0 ASCITES, MALIGNANT: Primary | ICD-10-CM

## 2024-08-30 DIAGNOSIS — F19.20 CONTROLLED DRUG DEPENDENCE (HCC): ICD-10-CM

## 2024-08-30 DIAGNOSIS — Z79.4 TYPE 2 DIABETES MELLITUS WITH DIABETIC POLYNEUROPATHY, WITH LONG-TERM CURRENT USE OF INSULIN (HCC): ICD-10-CM

## 2024-08-30 DIAGNOSIS — Z02.89 MEDICATION MANAGEMENT CONTRACT AGREEMENT: ICD-10-CM

## 2024-08-30 PROBLEM — R14.0 ABDOMINAL DISTENTION: Status: ACTIVE | Noted: 2024-08-30

## 2024-08-30 LAB
ALBUMIN SERPL-MCNC: 3.2 G/DL (ref 3.4–5)
ALBUMIN/GLOB SERPL: 0.9 {RATIO} (ref 1.1–2.2)
ALP SERPL-CCNC: 92 U/L (ref 40–129)
ALT SERPL-CCNC: <5 U/L (ref 10–40)
ANION GAP SERPL CALCULATED.3IONS-SCNC: 19 MMOL/L (ref 3–16)
APPEARANCE FLUID: NORMAL
AST SERPL-CCNC: 11 U/L (ref 15–37)
BDY FLUID QUALITY: NORMAL
BILIRUB SERPL-MCNC: 0.5 MG/DL (ref 0–1)
BUN SERPL-MCNC: 63 MG/DL (ref 7–20)
CALCIUM SERPL-MCNC: 8.3 MG/DL (ref 8.3–10.6)
CANCER AG125 SERPL-ACNC: 37.3 U/ML (ref 0–35)
CEA SERPL-MCNC: 1.9 NG/ML (ref 0–5)
CELL COUNT FLUID TYPE: NORMAL
CHLORIDE SERPL-SCNC: 83 MMOL/L (ref 99–110)
CO2 SERPL-SCNC: 28 MMOL/L (ref 21–32)
COLOR FLUID: YELLOW
CORTIS SERPL-MCNC: 15.8 UG/DL
CREAT SERPL-MCNC: 9.7 MG/DL (ref 0.6–1.1)
GFR SERPLBLD CREATININE-BSD FMLA CKD-EPI: 5 ML/MIN/{1.73_M2}
GLUCOSE BLD-MCNC: 107 MG/DL (ref 70–99)
GLUCOSE BLD-MCNC: 120 MG/DL (ref 70–99)
GLUCOSE BLD-MCNC: 148 MG/DL (ref 70–99)
GLUCOSE BLD-MCNC: 186 MG/DL (ref 70–99)
GLUCOSE SERPL-MCNC: 136 MG/DL (ref 70–99)
HAV IGM SERPL QL IA: NORMAL
HBV CORE IGM SERPL QL IA: NORMAL
HBV SURFACE AG SERPL QL IA: NORMAL
HCV AB SERPL QL IA: NORMAL
LYMPHOCYTES NFR FLD: 32 %
MACROPHAGES # FLD: 40 %
MESOTHL CELL NFR FLD: 12 %
MONOCYTES NFR FLD: 11 %
NEUTROPHIL, FLUID: 5 %
NUC CELL # FLD: 268 /CUMM
PERFORMED ON: ABNORMAL
POTASSIUM SERPL-SCNC: 4.4 MMOL/L (ref 3.5–5.1)
PROT SERPL-MCNC: 6.7 G/DL (ref 6.4–8.2)
RBC FLUID: 6600 /CUMM
SODIUM SERPL-SCNC: 130 MMOL/L (ref 136–145)
TOTAL CELLS COUNTED FLD: 100

## 2024-08-30 PROCEDURE — 87205 SMEAR GRAM STAIN: CPT

## 2024-08-30 PROCEDURE — 93306 TTE W/DOPPLER COMPLETE: CPT

## 2024-08-30 PROCEDURE — 6360000002 HC RX W HCPCS

## 2024-08-30 PROCEDURE — 83615 LACTATE (LD) (LDH) ENZYME: CPT

## 2024-08-30 PROCEDURE — 1200000000 HC SEMI PRIVATE

## 2024-08-30 PROCEDURE — P9047 ALBUMIN (HUMAN), 25%, 50ML: HCPCS

## 2024-08-30 PROCEDURE — 87075 CULTR BACTERIA EXCEPT BLOOD: CPT

## 2024-08-30 PROCEDURE — 82945 GLUCOSE OTHER FLUID: CPT

## 2024-08-30 PROCEDURE — 82042 OTHER SOURCE ALBUMIN QUAN EA: CPT

## 2024-08-30 PROCEDURE — 82378 CARCINOEMBRYONIC ANTIGEN: CPT

## 2024-08-30 PROCEDURE — 86301 IMMUNOASSAY TUMOR CA 19-9: CPT

## 2024-08-30 PROCEDURE — 5A1D70Z PERFORMANCE OF URINARY FILTRATION, INTERMITTENT, LESS THAN 6 HOURS PER DAY: ICD-10-PCS | Performed by: INTERNAL MEDICINE

## 2024-08-30 PROCEDURE — 6370000000 HC RX 637 (ALT 250 FOR IP)

## 2024-08-30 PROCEDURE — 82533 TOTAL CORTISOL: CPT

## 2024-08-30 PROCEDURE — 88112 CYTOPATH CELL ENHANCE TECH: CPT

## 2024-08-30 PROCEDURE — 2709999900 US GUIDED PARACENTESIS

## 2024-08-30 PROCEDURE — 88305 TISSUE EXAM BY PATHOLOGIST: CPT

## 2024-08-30 PROCEDURE — 90935 HEMODIALYSIS ONE EVALUATION: CPT

## 2024-08-30 PROCEDURE — 89051 BODY FLUID CELL COUNT: CPT

## 2024-08-30 PROCEDURE — 82150 ASSAY OF AMYLASE: CPT

## 2024-08-30 PROCEDURE — 49083 ABD PARACENTESIS W/IMAGING: CPT

## 2024-08-30 PROCEDURE — 80053 COMPREHEN METABOLIC PANEL: CPT

## 2024-08-30 PROCEDURE — 87070 CULTURE OTHR SPECIMN AEROBIC: CPT

## 2024-08-30 PROCEDURE — 86304 IMMUNOASSAY TUMOR CA 125: CPT

## 2024-08-30 PROCEDURE — 0W9G3ZZ DRAINAGE OF PERITONEAL CAVITY, PERCUTANEOUS APPROACH: ICD-10-PCS | Performed by: STUDENT IN AN ORGANIZED HEALTH CARE EDUCATION/TRAINING PROGRAM

## 2024-08-30 PROCEDURE — 84157 ASSAY OF PROTEIN OTHER: CPT

## 2024-08-30 PROCEDURE — 2580000003 HC RX 258

## 2024-08-30 PROCEDURE — 36415 COLL VENOUS BLD VENIPUNCTURE: CPT

## 2024-08-30 RX ORDER — LANOLIN ALCOHOL/MO/W.PET/CERES
3 CREAM (GRAM) TOPICAL NIGHTLY
Status: DISCONTINUED | OUTPATIENT
Start: 2024-08-30 | End: 2024-08-31 | Stop reason: HOSPADM

## 2024-08-30 RX ORDER — BUSPIRONE HYDROCHLORIDE 10 MG/1
10 TABLET ORAL 2 TIMES DAILY
Status: DISCONTINUED | OUTPATIENT
Start: 2024-08-30 | End: 2024-08-31 | Stop reason: HOSPADM

## 2024-08-30 RX ORDER — ALBUMIN (HUMAN) 12.5 G/50ML
120 SOLUTION INTRAVENOUS ONCE
Status: COMPLETED | OUTPATIENT
Start: 2024-08-30 | End: 2024-08-30

## 2024-08-30 RX ORDER — INSULIN GLARGINE 100 [IU]/ML
5 INJECTION, SOLUTION SUBCUTANEOUS NIGHTLY
Status: DISCONTINUED | OUTPATIENT
Start: 2024-08-30 | End: 2024-08-31 | Stop reason: HOSPADM

## 2024-08-30 RX ORDER — ALPRAZOLAM 2 MG/1
2 TABLET, EXTENDED RELEASE ORAL EVERY MORNING
Qty: 90 TABLET | Refills: 0 | Status: ON HOLD | OUTPATIENT
Start: 2024-08-30 | End: 2024-11-28

## 2024-08-30 RX ORDER — ALBUMIN (HUMAN) 12.5 G/50ML
50 SOLUTION INTRAVENOUS ONCE
Status: DISCONTINUED | OUTPATIENT
Start: 2024-08-30 | End: 2024-08-30

## 2024-08-30 RX ORDER — DEXTROSE MONOHYDRATE 100 MG/ML
INJECTION, SOLUTION INTRAVENOUS CONTINUOUS PRN
Status: DISCONTINUED | OUTPATIENT
Start: 2024-08-30 | End: 2024-08-31 | Stop reason: HOSPADM

## 2024-08-30 RX ORDER — POLYETHYLENE GLYCOL 3350 17 G/17G
17 POWDER, FOR SOLUTION ORAL DAILY PRN
Status: DISCONTINUED | OUTPATIENT
Start: 2024-08-30 | End: 2024-08-31 | Stop reason: HOSPADM

## 2024-08-30 RX ORDER — SODIUM CHLORIDE 0.9 % (FLUSH) 0.9 %
5-40 SYRINGE (ML) INJECTION PRN
Status: DISCONTINUED | OUTPATIENT
Start: 2024-08-30 | End: 2024-08-31 | Stop reason: HOSPADM

## 2024-08-30 RX ORDER — INSULIN LISPRO 100 [IU]/ML
0-4 INJECTION, SOLUTION INTRAVENOUS; SUBCUTANEOUS NIGHTLY
Status: DISCONTINUED | OUTPATIENT
Start: 2024-08-30 | End: 2024-08-31 | Stop reason: HOSPADM

## 2024-08-30 RX ORDER — SODIUM CHLORIDE 0.9 % (FLUSH) 0.9 %
5-40 SYRINGE (ML) INJECTION EVERY 12 HOURS SCHEDULED
Status: DISCONTINUED | OUTPATIENT
Start: 2024-08-30 | End: 2024-08-31 | Stop reason: HOSPADM

## 2024-08-30 RX ORDER — ACETAMINOPHEN 650 MG/1
650 SUPPOSITORY RECTAL EVERY 6 HOURS PRN
Status: DISCONTINUED | OUTPATIENT
Start: 2024-08-30 | End: 2024-08-31 | Stop reason: HOSPADM

## 2024-08-30 RX ORDER — HEPARIN SODIUM 1000 [USP'U]/ML
3200 INJECTION, SOLUTION INTRAVENOUS; SUBCUTANEOUS PRN
Status: DISCONTINUED | OUTPATIENT
Start: 2024-08-30 | End: 2024-08-31 | Stop reason: HOSPADM

## 2024-08-30 RX ORDER — ALPRAZOLAM 0.5 MG
0.5 TABLET ORAL NIGHTLY PRN
Status: DISCONTINUED | OUTPATIENT
Start: 2024-08-30 | End: 2024-08-31

## 2024-08-30 RX ORDER — CYCLOBENZAPRINE HCL 10 MG
5 TABLET ORAL DAILY PRN
Status: DISCONTINUED | OUTPATIENT
Start: 2024-08-30 | End: 2024-08-31 | Stop reason: HOSPADM

## 2024-08-30 RX ORDER — GLUCAGON 1 MG/ML
1 KIT INJECTION PRN
Status: DISCONTINUED | OUTPATIENT
Start: 2024-08-30 | End: 2024-08-31 | Stop reason: HOSPADM

## 2024-08-30 RX ORDER — SODIUM CHLORIDE 9 MG/ML
INJECTION, SOLUTION INTRAVENOUS PRN
Status: DISCONTINUED | OUTPATIENT
Start: 2024-08-30 | End: 2024-08-31 | Stop reason: HOSPADM

## 2024-08-30 RX ORDER — INSULIN LISPRO 100 [IU]/ML
0-4 INJECTION, SOLUTION INTRAVENOUS; SUBCUTANEOUS
Status: DISCONTINUED | OUTPATIENT
Start: 2024-08-30 | End: 2024-08-31 | Stop reason: HOSPADM

## 2024-08-30 RX ORDER — ACETAMINOPHEN 325 MG/1
650 TABLET ORAL EVERY 6 HOURS PRN
Status: DISCONTINUED | OUTPATIENT
Start: 2024-08-30 | End: 2024-08-31 | Stop reason: HOSPADM

## 2024-08-30 RX ORDER — ALBUMIN (HUMAN) 12.5 G/50ML
100 SOLUTION INTRAVENOUS ONCE
Status: DISCONTINUED | OUTPATIENT
Start: 2024-08-30 | End: 2024-08-30

## 2024-08-30 RX ORDER — ATORVASTATIN CALCIUM 40 MG/1
40 TABLET, FILM COATED ORAL NIGHTLY
Status: DISCONTINUED | OUTPATIENT
Start: 2024-08-30 | End: 2024-08-31 | Stop reason: HOSPADM

## 2024-08-30 RX ORDER — FUROSEMIDE 20 MG
20 TABLET ORAL DAILY
Status: DISCONTINUED | OUTPATIENT
Start: 2024-08-30 | End: 2024-08-30

## 2024-08-30 RX ORDER — HEPARIN SODIUM 5000 [USP'U]/ML
5000 INJECTION, SOLUTION INTRAVENOUS; SUBCUTANEOUS EVERY 8 HOURS SCHEDULED
Status: DISCONTINUED | OUTPATIENT
Start: 2024-08-30 | End: 2024-08-31 | Stop reason: HOSPADM

## 2024-08-30 RX ORDER — ONDANSETRON 2 MG/ML
4 INJECTION INTRAMUSCULAR; INTRAVENOUS EVERY 6 HOURS PRN
Status: DISCONTINUED | OUTPATIENT
Start: 2024-08-30 | End: 2024-08-31 | Stop reason: HOSPADM

## 2024-08-30 RX ORDER — SPIRONOLACTONE 50 MG/1
50 TABLET, FILM COATED ORAL DAILY
Status: DISCONTINUED | OUTPATIENT
Start: 2024-08-30 | End: 2024-08-30

## 2024-08-30 RX ORDER — HEPARIN SODIUM 1000 [USP'U]/ML
1000 INJECTION, SOLUTION INTRAVENOUS; SUBCUTANEOUS
Status: ACTIVE | OUTPATIENT
Start: 2024-08-30 | End: 2024-08-30

## 2024-08-30 RX ORDER — 0.9 % SODIUM CHLORIDE 0.9 %
100 INTRAVENOUS SOLUTION INTRAVENOUS PRN
Status: DISCONTINUED | OUTPATIENT
Start: 2024-08-30 | End: 2024-08-31 | Stop reason: HOSPADM

## 2024-08-30 RX ORDER — ONDANSETRON 4 MG/1
4 TABLET, ORALLY DISINTEGRATING ORAL EVERY 8 HOURS PRN
Status: DISCONTINUED | OUTPATIENT
Start: 2024-08-30 | End: 2024-08-31 | Stop reason: HOSPADM

## 2024-08-30 RX ADMIN — HYDROMORPHONE HYDROCHLORIDE 0.5 MG: 1 INJECTION, SOLUTION INTRAMUSCULAR; INTRAVENOUS; SUBCUTANEOUS at 19:55

## 2024-08-30 RX ADMIN — ALPRAZOLAM 0.5 MG: 0.5 TABLET ORAL at 03:34

## 2024-08-30 RX ADMIN — SODIUM CHLORIDE, PRESERVATIVE FREE 5 ML: 5 INJECTION INTRAVENOUS at 21:00

## 2024-08-30 RX ADMIN — Medication 10 ML: at 10:42

## 2024-08-30 RX ADMIN — BUSPIRONE HYDROCHLORIDE 10 MG: 10 TABLET ORAL at 08:01

## 2024-08-30 RX ADMIN — ALBUMIN (HUMAN) 120 G: 0.25 INJECTION, SOLUTION INTRAVENOUS at 18:06

## 2024-08-30 RX ADMIN — HEPARIN SODIUM 5000 UNITS: 5000 INJECTION INTRAVENOUS; SUBCUTANEOUS at 18:09

## 2024-08-30 RX ADMIN — HYDROMORPHONE HYDROCHLORIDE 0.5 MG: 1 INJECTION, SOLUTION INTRAMUSCULAR; INTRAVENOUS; SUBCUTANEOUS at 08:03

## 2024-08-30 RX ADMIN — ALPRAZOLAM 0.5 MG: 0.5 TABLET ORAL at 19:53

## 2024-08-30 RX ADMIN — SODIUM CHLORIDE, PRESERVATIVE FREE 10 ML: 5 INJECTION INTRAVENOUS at 08:02

## 2024-08-30 RX ADMIN — HYDROMORPHONE HYDROCHLORIDE 0.5 MG: 1 INJECTION, SOLUTION INTRAMUSCULAR; INTRAVENOUS; SUBCUTANEOUS at 03:34

## 2024-08-30 RX ADMIN — INSULIN GLARGINE 5 UNITS: 100 INJECTION, SOLUTION SUBCUTANEOUS at 19:56

## 2024-08-30 RX ADMIN — HYDROMORPHONE HYDROCHLORIDE 0.5 MG: 1 INJECTION, SOLUTION INTRAMUSCULAR; INTRAVENOUS; SUBCUTANEOUS at 12:35

## 2024-08-30 ASSESSMENT — ENCOUNTER SYMPTOMS
SHORTNESS OF BREATH: 0
DIARRHEA: 0
RHINORRHEA: 0
VOMITING: 1
COUGH: 0
ABDOMINAL DISTENTION: 1
SORE THROAT: 0
CONSTIPATION: 0
ABDOMINAL PAIN: 1

## 2024-08-30 ASSESSMENT — PAIN SCALES - WONG BAKER
WONGBAKER_NUMERICALRESPONSE: HURTS EVEN MORE
WONGBAKER_NUMERICALRESPONSE: NO HURT

## 2024-08-30 ASSESSMENT — PAIN SCALES - GENERAL
PAINLEVEL_OUTOF10: 10
PAINLEVEL_OUTOF10: 7
PAINLEVEL_OUTOF10: 0
PAINLEVEL_OUTOF10: 3
PAINLEVEL_OUTOF10: 4
PAINLEVEL_OUTOF10: 9
PAINLEVEL_OUTOF10: 0
PAINLEVEL_OUTOF10: 0
PAINLEVEL_OUTOF10: 10
PAINLEVEL_OUTOF10: 9

## 2024-08-30 ASSESSMENT — PAIN DESCRIPTION - ORIENTATION
ORIENTATION: ANTERIOR;MID
ORIENTATION: MID
ORIENTATION: MID
ORIENTATION: ANTERIOR
ORIENTATION: OTHER (COMMENT)

## 2024-08-30 ASSESSMENT — PAIN - FUNCTIONAL ASSESSMENT
PAIN_FUNCTIONAL_ASSESSMENT: PREVENTS OR INTERFERES SOME ACTIVE ACTIVITIES AND ADLS
PAIN_FUNCTIONAL_ASSESSMENT: PREVENTS OR INTERFERES SOME ACTIVE ACTIVITIES AND ADLS
PAIN_FUNCTIONAL_ASSESSMENT: 0-10
PAIN_FUNCTIONAL_ASSESSMENT: PREVENTS OR INTERFERES SOME ACTIVE ACTIVITIES AND ADLS
PAIN_FUNCTIONAL_ASSESSMENT: PREVENTS OR INTERFERES SOME ACTIVE ACTIVITIES AND ADLS
PAIN_FUNCTIONAL_ASSESSMENT: ACTIVITIES ARE NOT PREVENTED

## 2024-08-30 ASSESSMENT — PAIN DESCRIPTION - DESCRIPTORS
DESCRIPTORS: CRAMPING;DISCOMFORT;SHARP;PRESSURE
DESCRIPTORS: CRAMPING
DESCRIPTORS: CRAMPING
DESCRIPTORS: CRAMPING;DISCOMFORT;SHARP;STABBING
DESCRIPTORS: CRAMPING

## 2024-08-30 ASSESSMENT — PAIN DESCRIPTION - LOCATION
LOCATION: ABDOMEN;BACK
LOCATION: ABDOMEN

## 2024-08-30 ASSESSMENT — PAIN DESCRIPTION - PAIN TYPE: TYPE: ACUTE PAIN

## 2024-08-30 ASSESSMENT — PAIN DESCRIPTION - ONSET: ONSET: ON-GOING

## 2024-08-30 ASSESSMENT — PAIN DESCRIPTION - DIRECTION
RADIATING_TOWARDS: BACK
RADIATING_TOWARDS: BACK

## 2024-08-30 ASSESSMENT — PAIN DESCRIPTION - FREQUENCY: FREQUENCY: CONTINUOUS

## 2024-08-30 NOTE — PROGRESS NOTES
Dr Reagan at bedside infant tolerated eye exam well eyes stage one zone two right and stage two zone two right recheck in two weeks.   Treatment time: 3.5 hrs    Net UF: 100 ml     Pre weight: 72.7 kg  Post weight: 72.6 kg     Access used: Rtdc  Access function:  tolerated well,  BFR 350ml/min     Medications or blood products given: heparin dwells     Regular outpatient schedule: MWF     Summary of response to treatment: Pt verbalizes pain in her back and around her abdomen, wanting to be taken of and cannot be persuaded to continue with the treatment. Informed MD, Primary RN aware. AMA signed.  Pt remained stable throughout entire treatment and upon exiting the hemodialysis suite.      Copy of dialysis treatment record placed in chart, to be scanned into EMR.

## 2024-08-30 NOTE — TELEPHONE ENCOUNTER
Last OV: 8/1/2024  Next OV: 10/31/2024    Next appointment due:11/01/24    Last fill:06/03/24  Refills:0

## 2024-08-30 NOTE — CONSULTS
Consult received.  Labs and notes were reviewed.  Case was discussed with the staff.    Full note to follow.    Thanks  Nephrology  [address] [place] [address] [id number]  [place] [address]  Office: [phone]  Cell: [phone]  Fax: [phone]

## 2024-08-30 NOTE — TELEPHONE ENCOUNTER
Noted. Thank you for attending to this. I'm aware that pt is in the hospital. Her  is a responsible person and is her caretaker. Will approve rx.    PDMP monitoring:  -PDMP (prescription drug monitoring report) was obtained and reviewed for the past one year and no indicators of drug misuse were found.   -Any other controlled substance prescriptions seen on the record have been accounted for.  I am aware of the patient receiving these medications.  -PDMP report will be re-checked prior (or during) office visit and prior to medication refill request.  -Last PDMP report reviewed on:   Last PDMP [place] as Reviewed ([place]):  Review User Review Instant Review Result   DAMON JAIMES 8/30/2024  4:34 PM Reviewed PDMP [1]     Damon Jaimes MD  Family Medicine  [facility]

## 2024-08-30 NOTE — H&P
hours.    Radiology:  No orders to display         Assessment & Plan   49-year-old female with PMH of ESRD on HD, CVA, leading to legal blindness, anxiety, was sent to [facility] from gastroenterology for new abdominal ascites for evaluation by GYN oncology.    Ascites likely 2/2 malignancy  DDx: Cirrhosis vs malignancy vs endocrine;   ? Meigs syndrome? Cushing's syndrome  Cirrhosis unlikely as ultrasound liver revealed no evidence of cirrhosis.  CT scan did not reveal any evidence for malignancy  Albumin 4.2->3.2.  ALT, AST not elevated.  MELD score: 25, 14 to 15% mortality. Beta-hCG positive, no evidence of intrauterine pregnancy.  -Gynecology oncology consulted; follow-up recs  -Diagnostic and therapeutic paracentesis;          follow-up SAaG gradient          Follow-up cultures         Follow-up CEA antigen         Tuberculous  - HOLDING SBP prophylaxis; as patient allergic to Rocephin, ciprofloxacin  - Replete albumin following paracentesis  - holding off Aldactone 50 mg and Lasix 20 Mg for now, may consider after paracentesis  - NPO for now for possible intervention; Low-sodium diet afterwards  - GI consulted  - CA 19-9, CEA  - morning cortisol    abdominal pain   dilaudid pain panel    Elevated proBNP, troponinemia  Patient does not complain of any chest pain, SOB.  No pedal edema, crackles on examination.  Patient has baseline elevated troponin, BNP.    - Diuresis as above  -Troponinemia due to ESRD  - monitor for now    Electrolyte derangement   hyponatremia, hypochloremia  Most likely due to nausea, vomiting.    - HD today, holding off NS infusion    ESRD on HD:  -Patient on HD MWF.  -Consulted nephrology      Chronic medical conditions:  DM2:  Patient reportedly takes 15 units Lantus nightly  -Is n.p.o. for now  -Lantus 5 units nightly  -LDSSI    Anxiety: Continue home BuSpar  Patient reports that she takes Atarax.  Atarax not listed as home medications in epic.    CVA: Patient reports residual deficits from

## 2024-08-30 NOTE — CARE COORDINATION
Case Management Assessment  Initial Evaluation    Date/Time of Evaluation: 8/30/2024 4:34 PM  Assessment Completed by: Kylee Dover RN    If patient is discharged prior to next notation, then this note serves as note for discharge by case management.    Patient Name: Kristine Ramirez                   YOB: 1975  Diagnosis: Abdominal distention [R14.0]  Ascites, malignant [R18.0]                   Date / Time: 8/30/2024 12:48 AM    Patient Admission Status: Inpatient   Readmission Risk (Low < 19, Mod (19-27), High > 27): Readmission Risk Score: 16.5    Current PCP: Damon Mireles MD  PCP verified by CM? Yes    Chart Reviewed: Yes      History Provided by: Patient  Patient Orientation: Alert and Oriented, Person, Place, Situation    Patient Cognition: Alert    Hospitalization in the last 30 days (Readmission):  No    If yes, Readmission Assessment in  Navigator will be completed.    Advance Directives:      Code Status: Full Code   Patient's Primary Decision Maker is: Legal Next of Kin    Primary Decision Maker: Kannan Ramirez - Spouse - [phone]    Discharge Planning:    Patient lives with: Spouse/Significant Other Type of Home: Other (Comment) (handicap Condo)  Primary Care Giver: Self  Patient Support Systems include: Spouse/Significant Other   Current Financial resources: Medicare  Current community resources: Other (Comment) (OP HD)  Current services prior to admission: Durable Medical Equipment            Current DME: Wheelchair, Walker            Type of Home Care services:  None    ADLS  Prior functional level: Independent in ADLs/IADLs  Current functional level: Independent in ADLs/IADLs    PT AM-PAC:   /24  OT AM-PAC:   /24    Family can provide assistance at DC: Yes  Would you like Case Management to discuss the discharge plan with any other family members/significant others, and if so, who? Yes  Plans to Return to Present Housing: Yes  Other Identified Issues/Barriers to RETURNING  Discharge Plan? Yes    Kylee Dover RN  Case Management Department  Ph: [phone]

## 2024-08-30 NOTE — PROGRESS NOTES
Report given to [facility]. All belongings transported with transport team.  Report called to Kim for RN (Mitzy) to RN report.

## 2024-08-30 NOTE — PLAN OF CARE
Problem: Pain  Goal: Verbalizes/displays adequate comfort level or baseline comfort level  Outcome: Progressing   Pt reports severe pain in the morning, decreased in pain after paracentesis   Problem: Safety - Adult  Goal: Free from fall injury  Outcome: Progressing  Pt free of fall during this shift. Pt bed Alarm on, locked and low. Call light within reach      Problem: Skin/Tissue Integrity - Adult  Goal: Skin integrity remains intact  Outcome: Progressing   Pt noted no new skin issues during this shift

## 2024-08-30 NOTE — PROGRESS NOTES
I was called to consult in error-routine gyn.   I believe GYN oncology already on case given notes-already discussed with Dr. Julien per ER MD.  Positive pregnancy test most likely false positive due to ascites. Can be followed. Dr. Julien already consulted per notes.

## 2024-08-30 NOTE — FLOWSHEET NOTE
Admitted pt from [facility], Alert oriented x4, with complain of ascitis, on room air. Oriented to staff, call system, bed control and room set-up. Vital signs taken and recorded. Tele applied. Informed admission to MD for orders. Physical assessment done. Pt is legally blind, left eye is completely blind while right eye is blurred. She strongly refused for the bed alarm as she sometimes sit at the edge of the bed as her comfortable position.     08/30/24 0059   Vitals   Temp 97.5 °F (36.4 °C)   Temp Source Oral   Pulse 97   Heart Rate Source Monitor   Respirations 16   /73   MAP (Calculated) 90   BP Location Left upper arm   BP Upper/Lower Upper   BP Method Automatic   Patient Position High fowlers   Cardiac Rhythm Sinus rhythm   Pain Assessment   Pain Assessment 0-10   Pain Level 9   Pain Location Abdomen;Back   Pain Orientation Other (Comment)  (Around the abdomen)   Pain Descriptors Cramping   Functional Pain Assessment Prevents or interferes some active activities and ADLs   Pain Radiating Towards back   Pain Frequency Continuous   Pain Onset On-going   Oxygen Therapy   SpO2 98 %   Pulse Oximetry Type Intermittent   Pulse Oximeter Device Mode Intermittent   Pulse Oximeter Device Location Left;Finger   O2 Device None (Room air)   Height and Weight   Height 1.702 m (5' 7\")   Weight - Scale 72.7 kg (160 lb 4.4 oz)   BSA (Calculated - sq m) 1.85 sq meters   BMI (Calculated) 25.2

## 2024-08-30 NOTE — PROGRESS NOTES
[employer]                [phone]              NEPHROLOGIST DIALYSIS NOTE:    Seen during dialysis  Vitals and labs as given below.     Please see today's consult note for remaining details    Vitals:    08/30/24 1208   BP: 114/72   Pulse: (!) 104   Resp: 18   Temp: (!) 87.7 °F (30.9 °C)   SpO2: 96%     Lab Results   Component Value Date/Time     08/30/2024 03:17 AM    K 4.4 08/30/2024 03:17 AM    CL 83 08/30/2024 03:17 AM    CO2 28 08/30/2024 03:17 AM    BUN 63 08/30/2024 03:17 AM    CREATININE 9.7 08/30/2024 03:17 AM    GLUCOSE 136 08/30/2024 03:17 AM    CALCIUM 8.3 08/30/2024 03:17 AM           Please call with questions at-    24 Hrs Answering service [phone]     [employer]

## 2024-08-30 NOTE — PROGRESS NOTES
Internal Medicine Progress Note    Date: 8/30/2024   Patient: Kristine Ramirez   [facility] 0      CC: No chief complaint on file.       Interval Hx   NAEON  Pt vitally and stable  GCS 15/15 well oriented x4  No fever, leukocytosis           HPI:   49-year-old female with PMH of ESRD on HD, CVA leading to legal blindness, anxiety, was sent to [facility] from gastroenterology appointment for new abdominal ascites for evaluation by GYN oncology.        According to the patient, patient was in her usual state of health in November, when she started noticing her abdomen getting distended.  According to patient, she attributed the abdominal swelling because of fluid overload on non HD days.  Patient reports progressive increase in abdominal distention.  Patient complains of worsening abdominal pain starting 3 months ago which she describes severe pain 9 out of 10 intensity, sharp in nature, continuous, located around her abdomen radiating to her back.  Patient denies any chest pain, shortness of breath.  Patient reports nausea associated with her increased abdominal distention and pressure-like symptoms, due to which she is unable to tolerate p.o. intake and she throws up after every meal.  Vomitus is nonbloody, nonbilious.  She denies diarrhea, reports being constipated, 1 BM in 7 days.  She urinates once in 3 days due to her CKD.  Patient's primary complaint about her ascites is the pressure-like symptom and pain.  She denies any weight loss, weight gain recently.  Patient is postmenopausal, and had regular.  In the past.  She denies any history of cancer.  She does have family history of colon cancer in her father.  Patient denies alcohol consumption or illicit drugs. She smokes cigarettes everyday.     According to patient, patient was referred from her nephrologist to gastroenterology for ascites.  She had her first office visit 8/29, when she was sent to ED for further evaluation of her new onset ascites.    In th

## 2024-08-30 NOTE — TELEPHONE ENCOUNTER
Pt requesting medication refill.   Disp Refills Start End    ALPRAZolam (ALPRAZOLAM XR) 2 MG extended release tablet 90 tablet 0 6/3/2024 9/1/2024    Sig - Route: Take 1 tablet by mouth every morning for 90 days. Max Daily Amount: 2 mg - Oral    [facility] 8/1/2024  Nov 10/31/2024

## 2024-08-30 NOTE — ED PROVIDER NOTES
EMERGENCY DEPARTMENT PROVIDER NOTE    Patient Identification  Pt Name: Kristine Ramirez  MRN: [id number]  Birthdate 1975  Date of evaluation: 8/29/2024  Provider: Bubba Rivera DO  PCP: Damon Mireles MD    Chief Complaint  Abdominal Pain (Pt via self from home, GI sent for abdominal distension, pt with swollen abdomen, states gotten worse the last few weeks, is on dialysis )      HPI  (History provided by patient and spouse/SO)  This is a 49 y.o. female with pertinent past medical history of ES D on hemodialysis, hypertension who was brought in by family for abdominal pain and swelling.  Patient reports history of fluid buildup which is due to dialysis, however the past several weeks she has had no edema aside from swelling of her abdomen.  She denies history of similar symptoms.  Patient was referred to gastroenterology and had her first clinic appointment today, she was referred from the gastroenterology clinic to the emergency department for further evaluation and admission due to concern for new onset ascites.  She denies any fevers, chills, chest pain or shortness of breath.  She has been adherent to her routine dialysis.      I have reviewed the following nursing documentation:  Allergies: Amoxicillin, Levofloxacin, Vancomycin, and Tape [adhesive tape]    Past medical history:   Past Medical History:   Diagnosis Date    Acute respiratory failure due to COVID-19 ([facility]) 10/16/2021    Arterial ischemic stroke, ICA, left, acute ([facility])     Blind in both eyes     Cerebral artery occlusion with cerebral infarction ([facility])     CHF (congestive heart failure) ([facility])     Chronic kidney disease     COPD (chronic obstructive pulmonary disease) ([facility])     Depression     Diabetes mellitus out of control     Diabetes mellitus, type II ([facility])     2005    Diabetic neuropathy associated with type 2 diabetes mellitus ([facility])     Generalized headaches     Hypertension     Infertility     Insomnia     chronic vs lack of time  103 BPM    P-R Interval 112 ms    QRS Duration 78 ms    Q-T Interval 336 ms    QTc Calculation (Bazett) 440 ms    P Axis 21 degrees    R Axis 22 degrees    T Axis 134 degrees    Diagnosis       Sinus tachycardiaT wave abnormality, consider lateral ischemiaAbnormal ECG       Screenings           Is this patient to be included in the SEP-1 Core Measure due to severe sepsis or septic shock?   No     Exclusion criteria - the patient is NOT to be included for SEP-1 Core Measure due to:  Infection is not suspected      MDM and ED Course    Patient afebrile, mildly ill-appearing however nontoxic.  She is in no xiomara painful or respiratory distress.  No hypoxia or increased work of breathing while on room air.  Abdomen with pronounced distention, no peritoneal signs, no focal findings to suggest acute appendicitis or cholecystitis and my clinical suspicion is fairly low.  Clinically most concerning for ascites.  Laboratory evaluation with normal hepatic function and PT/INR.  Patient denies any history of hepatitis or alcohol abuse.  Pregnancy test does return positive, beta-hCG minimally elevated at 9.  On further discussion patient states she has not had a menstrual period for the past 4 years.  Did proceed with ultrasound which shows no evidence of ectopic or intrauterine pregnancy, normal appearance of ovaries.  Clinically suspicion for SBP or other bacterial infection is quite low, patient does have elevated procalcitonin however likely attributed to her ESRD.  EKG without evidence of acute ischemia, troponin is markedly elevated however no chest pain, on record review patient with chronic nonzero troponin.  Suspect troponin ovation secondary to ESRD and ascites, low suspicion for ACS.  Initially mentioned family history of ovarian cancer as well (although later in patient's Emergency Department course was clarified that the mother of the patient's spouse and not the patient herself is  from ovarian cancer).

## 2024-08-30 NOTE — CONSULTS
Ph: [phone], Fax: [phone]           [facility]               Reason for admission:    \"Abdominal Pain (Pt via self from home, GI sent for abdominal distension, pt with swollen abdomen, states gotten worse the last few weeks, is on dialysis )\"                 Brief Summary :     Kristine Ramirez is being seen by nephrology for \"ESRD\".      Assessment and plan:   ESRD on HD  - Initially developed ESRD after \"being put in a coma for 8 days after a stroke which caused her kidneys to shut down\"  - MWF schedule  - Gets dialysis at [facility]  - Still makes some urine, urinates once every three days  - Daily CMP w/ Mg  - Will get dialysis today    T2DM  - On 15 units Lantus nightly  - NPO for now  - Lantus 5 nightly  - LDSSi    Ascites likely 2/2 malignancy  DDx: Cirrhosis vs malignancy vs endocrine;   ? Meigs syndrome? Cushing's syndrome  Cirrhosis unlikely as ultrasound liver revealed no evidence of cirrhosis.  CT scan did not reveal any evidence for malignancy  Albumin 4.2->3.2.  ALT, AST not elevated.  MELD score: 25, 14 to 15% mortality. Beta-hCG positive, no evidence of intrauterine pregnancy.  -Gynecology oncology consulted; follow-up recs  -Diagnostic and therapeutic paracentesis;          follow-up SAaG gradient          Follow-up cultures         Follow-up CEA antigen         Tuberculous  - HOLDING SBP prophylaxis; as patient allergic to Rocephin, ciprofloxacin  - Replete albumin following paracentesis  - holding off Aldactone 50 mg and Lasix 20 Mg for now, may consider after paracentesis  - NPO for now for possible intervention; Low-sodium diet afterwards  - GI consulted  - CA 19-9, CEA  - morning cortisol     CVA  - Patient reports residual deficits from prior CVA. RONNIE joseph       [facility] would like to thank Haseeb Jarquin MD   for opportunity to serve this patient      Please call with questions at-   24 Hrs Answering service [phone] or  7 am-

## 2024-08-30 NOTE — CONSULTS
Ph: [phone], Fax: [phone]           [facility]               Reason for admission:    \"Abdominal Pain (Pt via self from home, GI sent for abdominal distension, pt with swollen abdomen, states gotten worse the last few weeks, is on dialysis )\"                 Brief Summary :     Kristine Ramirez is being seen by nephrology for \"ESRD\".    Plan:    Tolerated HD well   BP acceptable   Solutes stable   No need for RRT today       Assessment :   ESRD on HD  - Initially developed ESRD after \"being put in a coma for 8 days after a stroke which caused her kidneys to shut down\"  - MWF schedule  - Gets dialysis at [facility]  - Still makes some urine, urinates once every three days  - Daily CMP w/ Mg      T2DM  - per primary team    Ascites likely 2/2 malignancy       CVA  - Patient reports residual deficits from prior CVA.       [facility] Nephrology would like to thank Haseeb Jarquin MD   for opportunity to serve this patient      Please call with questions at-   24 Hrs Answering service [phone] or  7 am- 5 pm via Perfect serve or cell phone  Dr. Merrick Robertson MD       HPI :   \"49-year-old female with PMH of ESRD on HD, CVA leading to legal blindness, anxiety, was sent to [facility] from gastroenterology appointment for new abdominal ascites for evaluation by GYN oncology.        According to the patient, patient was in her usual state of health in November, when she started noticing her abdomen getting distended.  According to patient, she attributed the abdominal swelling because of fluid overload on non HD days.  Patient reports progressive increase in abdominal distention.  Patient complains of worsening abdominal pain starting 3 months ago which she describes severe pain 9 out of 10 intensity, sharp in nature, continuous, located around her abdomen radiating to her back.  Patient denies any chest pain, shortness of breath.  Patient reports nausea associated with her increased  PRN  heparin (porcine) injection 1,000 Units, 1,000 Units, IntraVENous, Once in dialysis  sodium chloride 0.9 % bolus 100 mL, 100 mL, IntraVENous, PRN  heparin (porcine) injection 3,200 Units, 3,200 Units, IntraCATHeter, PRN  albumin human 25% IV solution 120 g, 120 g, IntraVENous, Once    Vitals :     Vitals:    08/30/24 1753   BP: (!) 152/75   Pulse: (!) 107   Resp: 18   Temp: 97.8 °F (36.6 °C)   SpO2: 100%          Physical Examination :     appearance: Alert, orientated  Respiratory: no distress  Cardiovascular: no visibly  raised JVD, Edema (-)  Abdomen: - Protuberant, distended, positive fluid wave  Other relevant findings: None     Labs :     CBC:   Recent Labs     08/29/24  1651   WBC 6.6   HGB 11.4*   HCT 34.0*        BMP:    Recent Labs     08/29/24  1651 08/30/24  0317   * 130*   K 4.3 4.4   CL 84* 83*   CO2 25 28   BUN 60* 63*   CREATININE 9.5* 9.7*   GLUCOSE 135* 136*     Lab Results   Component Value Date/Time    COLORU YELLOW 02/20/2022 01:35 PM    NITRU Negative 02/20/2022 01:35 PM    GLUCOSEU Negative 02/20/2022 01:35 PM    KETUA Negative 02/20/2022 01:35 PM    UROBILINOGEN 0.2 02/20/2022 01:35 PM    BILIRUBINUR Negative 02/20/2022 01:35 PM        ----------------------------------------------------------  Please call with questions at      24 Hrs Answering service [phone]  Perfect serve, or cell phone 7 am - 5pm  Merrick Robertson MD   [facility]     Daryn Carr MD  Thanks  Nephrology  [address]  [place] [address]  Office: [phone]  Fax: [phone]

## 2024-08-30 NOTE — PLAN OF CARE
Problem: Discharge Planning  Goal: Discharge to home or other facility with appropriate resources  Outcome: Progressing     Problem: Pain  Goal: Verbalizes/displays adequate comfort level or baseline comfort level  Outcome: Progressing  Flowsheets (Taken 8/30/2024 0355)  Verbalizes/displays adequate comfort level or baseline comfort level:   Encourage patient to monitor pain and request assistance   Assess pain using appropriate pain scale   Administer analgesics based on type and severity of pain and evaluate response   Implement non-pharmacological measures as appropriate and evaluate response   Consider cultural and social influences on pain and pain management   Notify Licensed Independent Practitioner if interventions unsuccessful or patient reports new pain     Problem: Safety - Adult  Goal: Free from fall injury  Outcome: Progressing  Note: High risk for fall as pt is legally blind and need assistance in going to bathroom.  Call light within reach.  Assisted all her needs.  Bed maintain at it's lowest height and locked.     Problem: Skin/Tissue Integrity - Adult  Goal: Skin integrity remains intact  Outcome: Progressing  Flowsheets (Taken 8/30/2024 0355)  Skin Integrity Remains Intact: Monitor for areas of redness and/or skin breakdown     Problem: Musculoskeletal - Adult  Goal: Return mobility to safest level of function  Outcome: Progressing  Flowsheets (Taken 8/30/2024 0355)  Return Mobility to Safest Level of Function:   Assist with transfers and ambulation using safe patient handling equipment as needed   Assess patient stability and activity tolerance for standing, transferring and ambulating with or without assistive devices   Obtain physical therapy/occupational therapy consults as needed   Ensure adequate protection for wounds/incisions during mobilization     Problem: Gastrointestinal - Adult  Goal: Minimal or absence of nausea and vomiting  Outcome: Progressing  Flowsheets (Taken 8/30/2024

## 2024-08-30 NOTE — PROGRESS NOTES
4 Eyes Skin Assessment     NAME:  Kristine Ramirez  YOB: 1975  MEDICAL RECORD NUMBER:  [id number]    The patient is being assessed for  Admission    I agree that at least one RN has performed a thorough Head to Toe Skin Assessment on the patient. ALL assessment sites listed below have been assessed.      Areas assessed by both nurses:    Head, Face, Ears, Shoulders, Back, Chest, Arms, Elbows, Hands, Sacrum. Buttock, Coccyx, Ischium, and Legs. Feet and Heels        Does the Patient have a Wound? Sacral redness.       Robbie Prevention initiated by RN: Yes  Wound Care Orders initiated by RN: No    Pressure Injury (Stage 3,4, Unstageable, DTI, NWPT, and Complex wounds) if present, place Wound referral order by RN under : No    New Ostomies, if present place, Ostomy referral order under : No     Nurse 1 eSignature: Electronically signed by Mitzy Beth RN on 8/30/24 at 3:05 AM EDT    **SHARE this note so that the co-signing nurse can place an eSignature**    Nurse 2 eSignature: Electronically signed by Nilda Walker RN on 8/30/24 at 4:20 AM EDT     Statement Selected

## 2024-08-31 VITALS
SYSTOLIC BLOOD PRESSURE: 169 MMHG | DIASTOLIC BLOOD PRESSURE: 74 MMHG | RESPIRATION RATE: 18 BRPM | TEMPERATURE: 99.2 F | BODY MASS INDEX: 23.21 KG/M2 | WEIGHT: 147.9 LBS | HEART RATE: 117 BPM | HEIGHT: 67 IN | OXYGEN SATURATION: 96 %

## 2024-08-31 LAB
ALBUMIN FLD-MCNC: 2.9 G/DL
ALBUMIN SERPL-MCNC: 3.8 G/DL (ref 3.4–5)
ALBUMIN/GLOB SERPL: 1.6 {RATIO} (ref 1.1–2.2)
ALP SERPL-CCNC: 81 U/L (ref 40–129)
ALT SERPL-CCNC: <5 U/L (ref 10–40)
AMYLASE FLD-CCNC: 43 U/L
ANION GAP SERPL CALCULATED.3IONS-SCNC: 17 MMOL/L (ref 3–16)
AST SERPL-CCNC: 10 U/L (ref 15–37)
BASOPHILS # BLD: 0 K/UL (ref 0–0.2)
BASOPHILS NFR BLD: 0.3 %
BILIRUB SERPL-MCNC: 0.8 MG/DL (ref 0–1)
BUN SERPL-MCNC: 48 MG/DL (ref 7–20)
CALCIUM SERPL-MCNC: 8.5 MG/DL (ref 8.3–10.6)
CHLORIDE SERPL-SCNC: 90 MMOL/L (ref 99–110)
CO2 SERPL-SCNC: 29 MMOL/L (ref 21–32)
CREAT SERPL-MCNC: 7.9 MG/DL (ref 0.6–1.1)
DEPRECATED RDW RBC AUTO: 16.5 % (ref 12.4–15.4)
ECHO AO ROOT DIAM: 3 CM
ECHO AO ROOT INDEX: 1.57 CM/M2
ECHO AV AREA PEAK VELOCITY: 2.8 CM2
ECHO AV AREA VTI: 2.9 CM2
ECHO AV AREA/BSA PEAK VELOCITY: 1.5 CM2/M2
ECHO AV AREA/BSA VTI: 1.5 CM2/M2
ECHO AV MEAN GRADIENT: 4 MMHG
ECHO AV MEAN VELOCITY: 0.9 M/S
ECHO AV PEAK GRADIENT: 7 MMHG
ECHO AV PEAK VELOCITY: 1.4 M/S
ECHO AV VELOCITY RATIO: 0.79
ECHO AV VTI: 22.1 CM
ECHO BSA: 1.94 M2
ECHO LA AREA 2C: 14.4 CM2
ECHO LA AREA 4C: 21.9 CM2
ECHO LA MAJOR AXIS: 6 CM
ECHO LA MINOR AXIS: 5 CM
ECHO LA VOL BP: 50 ML (ref 22–52)
ECHO LA VOL MOD A2C: 33 ML (ref 22–52)
ECHO LA VOL MOD A4C: 65 ML (ref 22–52)
ECHO LA VOL/BSA BIPLANE: 26 ML/M2 (ref 16–34)
ECHO LA VOLUME INDEX MOD A2C: 17 ML/M2 (ref 16–34)
ECHO LA VOLUME INDEX MOD A4C: 34 ML/M2 (ref 16–34)
ECHO LV E' LATERAL VELOCITY: 7 CM/S
ECHO LV E' SEPTAL VELOCITY: 6 CM/S
ECHO LV EDV 3D: 127 ML
ECHO LV EDV INDEX 3D: 66 ML/M2
ECHO LV EF PHYSICIAN: 55 %
ECHO LV EJECTION FRACTION 3D: 50 %
ECHO LV ESV 3D: 64 ML
ECHO LV ESV INDEX 3D: 34 ML/M2
ECHO LV FRACTIONAL SHORTENING: 20 % (ref 28–44)
ECHO LV INTERNAL DIMENSION DIASTOLE INDEX: 2.57 CM/M2
ECHO LV INTERNAL DIMENSION DIASTOLIC: 4.9 CM (ref 3.9–5.3)
ECHO LV INTERNAL DIMENSION SYSTOLIC INDEX: 2.04 CM/M2
ECHO LV INTERNAL DIMENSION SYSTOLIC: 3.9 CM
ECHO LV IVSD: 1 CM (ref 0.6–0.9)
ECHO LV MASS 2D: 176 G (ref 67–162)
ECHO LV MASS 3D INDEX: 75.4 G/M2
ECHO LV MASS 3D: 144 G
ECHO LV MASS INDEX 2D: 92.2 G/M2 (ref 43–95)
ECHO LV POSTERIOR WALL DIASTOLIC: 1 CM (ref 0.6–0.9)
ECHO LV RELATIVE WALL THICKNESS RATIO: 0.41
ECHO LVOT AREA: 3.5 CM2
ECHO LVOT AV VTI INDEX: 0.83
ECHO LVOT DIAM: 2.1 CM
ECHO LVOT MEAN GRADIENT: 3 MMHG
ECHO LVOT PEAK GRADIENT: 5 MMHG
ECHO LVOT PEAK VELOCITY: 1.1 M/S
ECHO LVOT STROKE VOLUME INDEX: 33.3 ML/M2
ECHO LVOT SV: 63.7 ML
ECHO LVOT VTI: 18.4 CM
ECHO MV A VELOCITY: 1.38 M/S
ECHO MV AREA VTI: 2.3 CM2
ECHO MV E DECELERATION TIME (DT): 161 MS
ECHO MV E VELOCITY: 0.95 M/S
ECHO MV E/A RATIO: 0.69
ECHO MV E/E' LATERAL: 13.57
ECHO MV E/E' RATIO (AVERAGED): 14.7
ECHO MV E/E' SEPTAL: 15.83
ECHO MV LVOT VTI INDEX: 1.52
ECHO MV MAX VELOCITY: 1.2 M/S
ECHO MV MEAN GRADIENT: 2 MMHG
ECHO MV MEAN VELOCITY: 0.6 M/S
ECHO MV PEAK GRADIENT: 6 MMHG
ECHO MV REGURGITANT PEAK GRADIENT: 100 MMHG
ECHO MV REGURGITANT PEAK VELOCITY: 5 M/S
ECHO MV VTI: 28 CM
ECHO PV MAX VELOCITY: 0.8 M/S
ECHO PV PEAK GRADIENT: 3 MMHG
ECHO RA AREA 4C: 16.5 CM2
ECHO RA END SYSTOLIC VOLUME APICAL 4 CHAMBER INDEX BSA: 24 ML/M2
ECHO RA VOLUME: 46 ML
ECHO RV BASAL DIMENSION: 3.5 CM
ECHO RV FREE WALL PEAK S': 12 CM/S
ECHO RV MID DIMENSION: 3 CM
ECHO RV TAPSE: 2.1 CM (ref 1.7–?)
ECHO TV REGURGITANT MAX VELOCITY: 0.75 M/S
ECHO TV REGURGITANT PEAK GRADIENT: 2 MMHG
EOSINOPHIL # BLD: 0 K/UL (ref 0–0.6)
EOSINOPHIL NFR BLD: 0.4 %
GFR SERPLBLD CREATININE-BSD FMLA CKD-EPI: 6 ML/MIN/{1.73_M2}
GLUCOSE BLD-MCNC: 103 MG/DL (ref 70–99)
GLUCOSE BLD-MCNC: 131 MG/DL (ref 70–99)
GLUCOSE FLD-MCNC: 124 MG/DL
GLUCOSE SERPL-MCNC: 93 MG/DL (ref 70–99)
HCT VFR BLD AUTO: 31.8 % (ref 36–48)
HGB BLD-MCNC: 10.5 G/DL (ref 12–16)
INR PPP: 1.13 (ref 0.85–1.15)
LDH FLD L TO P-CCNC: 125 U/L
LDH SERPL L TO P-CCNC: 213 U/L (ref 100–190)
LYMPHOCYTES # BLD: 0.7 K/UL (ref 1–5.1)
LYMPHOCYTES NFR BLD: 7.7 %
MCH RBC QN AUTO: 27.8 PG (ref 26–34)
MCHC RBC AUTO-ENTMCNC: 33.2 G/DL (ref 31–36)
MCV RBC AUTO: 83.6 FL (ref 80–100)
MONOCYTES # BLD: 0.5 K/UL (ref 0–1.3)
MONOCYTES NFR BLD: 5.2 %
NEUTROPHILS # BLD: 8 K/UL (ref 1.7–7.7)
NEUTROPHILS NFR BLD: 86.4 %
PERFORMED ON: ABNORMAL
PERFORMED ON: ABNORMAL
PLATELET # BLD AUTO: 182 K/UL (ref 135–450)
PMV BLD AUTO: 7.3 FL (ref 5–10.5)
POTASSIUM SERPL-SCNC: 4.8 MMOL/L (ref 3.5–5.1)
PROT FLD-MCNC: 5 G/DL
PROT SERPL-MCNC: 6.2 G/DL (ref 6.4–8.2)
PROTHROMBIN TIME: 14.7 SEC (ref 11.9–14.9)
RBC # BLD AUTO: 3.8 M/UL (ref 4–5.2)
SODIUM SERPL-SCNC: 136 MMOL/L (ref 136–145)
SPECIMEN SOURCE FLD: NORMAL
TSH SERPL DL<=0.005 MIU/L-ACNC: 1.07 UIU/ML (ref 0.27–4.2)
WBC # BLD AUTO: 9.3 K/UL (ref 4–11)

## 2024-08-31 PROCEDURE — 84443 ASSAY THYROID STIM HORMONE: CPT

## 2024-08-31 PROCEDURE — 85025 COMPLETE CBC W/AUTO DIFF WBC: CPT

## 2024-08-31 PROCEDURE — 80053 COMPREHEN METABOLIC PANEL: CPT

## 2024-08-31 PROCEDURE — 6360000002 HC RX W HCPCS

## 2024-08-31 PROCEDURE — 6370000000 HC RX 637 (ALT 250 FOR IP): Performed by: HOSPITALIST

## 2024-08-31 PROCEDURE — 85610 PROTHROMBIN TIME: CPT

## 2024-08-31 PROCEDURE — 36415 COLL VENOUS BLD VENIPUNCTURE: CPT

## 2024-08-31 PROCEDURE — 83615 LACTATE (LD) (LDH) ENZYME: CPT

## 2024-08-31 PROCEDURE — 93306 TTE W/DOPPLER COMPLETE: CPT | Performed by: INTERNAL MEDICINE

## 2024-08-31 PROCEDURE — 2580000003 HC RX 258

## 2024-08-31 RX ORDER — ALPRAZOLAM 0.5 MG
0.5 TABLET ORAL 2 TIMES DAILY PRN
Status: DISCONTINUED | OUTPATIENT
Start: 2024-08-31 | End: 2024-08-31 | Stop reason: HOSPADM

## 2024-08-31 RX ADMIN — SODIUM CHLORIDE, PRESERVATIVE FREE 10 ML: 5 INJECTION INTRAVENOUS at 09:30

## 2024-08-31 RX ADMIN — HYDROMORPHONE HYDROCHLORIDE 0.5 MG: 1 INJECTION, SOLUTION INTRAMUSCULAR; INTRAVENOUS; SUBCUTANEOUS at 09:30

## 2024-08-31 RX ADMIN — Medication 5 ML: at 10:00

## 2024-08-31 RX ADMIN — ALPRAZOLAM 0.5 MG: 0.5 TABLET ORAL at 10:11

## 2024-08-31 RX ADMIN — HEPARIN SODIUM 5000 UNITS: 5000 INJECTION INTRAVENOUS; SUBCUTANEOUS at 05:33

## 2024-08-31 ASSESSMENT — PAIN SCALES - GENERAL: PAINLEVEL_OUTOF10: 6

## 2024-08-31 ASSESSMENT — PAIN DESCRIPTION - ORIENTATION: ORIENTATION: RIGHT;LEFT

## 2024-08-31 ASSESSMENT — PAIN DESCRIPTION - DESCRIPTORS: DESCRIPTORS: ACHING

## 2024-08-31 ASSESSMENT — PAIN DESCRIPTION - LOCATION: LOCATION: ABDOMEN;FLANK

## 2024-08-31 NOTE — PROGRESS NOTES
Internal Medicine Progress Note    Date: 8/31/2024   Patient: Kristine Pedrozaoumar   [facility] Day: 1      CC: No chief complaint on file.       Interval Hx   NAEON  Paracentesis removed 17.5L fluid, reveal SAAG<0.3, so portal HTN unlikely  Corrected neutrophil count is 241  Pt had an episode of low grade fever in the AM  Physical exam shows smaller, softer abdomen with improved pain  CEA - and  pending   +  HTN and tachycardic which pt says is her baseline        Objective     Vital Signs:  Patient Vitals for the past 8 hrs:   BP Temp Temp src Pulse Resp SpO2 Weight   08/31/24 0600 -- -- -- -- -- -- 67.1 kg (147 lb 14.4 oz)   08/31/24 0353 -- -- -- -- -- 96 % --   08/31/24 0352 (!) 166/82 100.2 °F (37.9 °C) Oral (!) 118 18 -- --       Physical Exam  HENT:      Mouth/Throat:      Mouth: Mucous membranes are moist.      Pharynx: Oropharynx is clear.   Eyes:      General: Scleral icterus (very slight jaundice) present.   Cardiovascular:      Rate and Rhythm: Regular rhythm. Tachycardia present.      Pulses: Normal pulses.      Heart sounds: Normal heart sounds.   Pulmonary:      Effort: Pulmonary effort is normal.      Breath sounds: Normal breath sounds.   Abdominal:      General: There is distension.      Tenderness: There is no guarding.   Musculoskeletal:      Right lower leg: No edema.      Left lower leg: No edema.   Skin:     Capillary Refill: Capillary refill takes less than 2 seconds.      Coloration: Skin is jaundiced and pale.   Neurological:      Mental Status: She is oriented to person, place, and time. Mental status is at baseline.      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      Motor: Weakness (right sided from prior CVA) present.   Psychiatric:         Thought Content: Thought content normal.            Labs:  CBC:   Recent Labs     08/29/24  1651 08/31/24  0333   WBC 6.6 9.3   HGB 11.4* 10.5*   HCT 34.0* 31.8*    182       BMP:

## 2024-08-31 NOTE — PROGRESS NOTES
Assisted pt to bathroom, gait unsteady, 's with ambulation. BP stable, temp 100.2 Denies any chest pain or SOB, continue to monitor.

## 2024-08-31 NOTE — PROGRESS NOTES
D/C order noted. SL and tele removed. Pt verbalized understanding of new medications and follow up with PCP and [facility]. Transported off unit per wheelchair with  to transport home.

## 2024-08-31 NOTE — DISCHARGE SUMMARY
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     According to the patient, patient was in her usual state of health in November, when she started noticing her abdomen getting distended.  According to patient, she attributed the abdominal swelling because of fluid overload on non HD days.  Patient reports progressive increase in abdominal distention.  Patient complains of worsening abdominal pain starting 3 months ago which she describes severe pain 9 out of 10 intensity, sharp in nature, continuous, located around her abdomen radiating to her back.  Patient denies any chest pain, shortness of breath.  Patient reports nausea associated with her increased abdominal distention and pressure-like symptoms, due to which she is unable to tolerate p.o. intake and she throws up after every meal.  Vomitus is nonbloody, nonbilious.  She denies diarrhea, reports being constipated, 1 BM in 7 days.  She urinates once in 3 days due to her CKD.  Patient's primary complaint about her ascites is the pressure-like symptom and pain.  She denies any weight loss, weight gain recently.  Patient is postmenopausal, and had regular.  In the past.  She denies any history of cancer.  She does have family history of colon cancer in her father.  Patient denies alcohol consumption or illicit drugs. She smokes cigarettes everyday.    yesterday     Tenderness: There is no guarding.   Musculoskeletal:      Right lower leg: No edema.      Left lower leg: No edema.   Skin:     Capillary Refill: Capillary refill takes less than 2 seconds.      Coloration: Skin is jaundiced and pale.   Neurological:      Mental Status: She is oriented to person, place, and time. Mental status is at baseline.      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      Motor: Weakness (right sided from prior CVA) present.   Psychiatric:         Thought Content: Thought content normal.        Consults: GI, gynecology, and gynecologic oncology  Significant Diagnostic Studies:  CT, USG, Paracentesis  Treatments: Paracentesis  Disposition: home  Discharged Condition: Stable  Follow Up: Primary Care Physician in one week    DISCHARGE MEDICATION:       Medication List        CONTINUE taking these medications      Handicap Placard Misc  by Does not apply route Expires on 5/18/2028     [employer] Pen Globe 31G 31G X 8 MM Misc  Generic drug: Insulin Pen Needle            ASK your doctor about these medications      albuterol sulfate  (90 Base) MCG/ACT inhaler  Commonly known as: PROVENTIL;VENTOLIN;PROAIR  Inhale 2 puffs into the lungs every 6 hours as needed for Wheezing or Shortness of Breath     ALPRAZolam 2 MG extended release tablet  Commonly known as: ALPRAZolam XR  Take 1 tablet by mouth every morning for 90 days. Max Daily Amount: 2 mg  Ask about: Which instructions should I use?     amLODIPine 10 MG tablet  Commonly known as: NORVASC     Anoro Ellipta 62.5-25 MCG/ACT inhaler  Generic drug: umeclidinium-vilanterol  INHALE 1 DOSE BY MOUTH DAILY     aspirin 81 MG EC tablet  Take 1 tablet by mouth daily     atorvastatin 40 MG tablet  Commonly known as: LIPITOR  Take 1 tablet by mouth nightly     busPIRone 10 MG tablet  Commonly known as: BUSPAR  TAKE 1 TABLET BY MOUTH TWICE A DAY     cloNIDine 0.2 MG tablet  Commonly known as:

## 2024-08-31 NOTE — PROGRESS NOTES
Pt complained of abdominal pan. Medicated with Dilaudid .5mg ivp, also gave Xanax for sleep. Vital signs , BP slightly elevated afebrile. Will continue to monitor alteration in comfort.

## 2024-08-31 NOTE — DISCHARGE INSTRUCTIONS
Thanks for being at [facility].    Follow Ups:  Please schedule appointment with PCP in 1 week.    Please schedule appointment with the gynecologic oncologist in 1 week to discuss the results for cancer markers and imaging.    Medication changes:  Your Lantus dose has been changed from 10 units to 5 units as per your glucose record in the hospital.  I have discontinued lispro insulin and Maxalt. Your PCP can decide if they want to continue these.   Remaining home meds have been continued. Your PCP can reconcile home meds.   PCP to decide any changes to your medicines on your next appointment.    Follow up closely with your PCP for management of HTN and other chronic coditions.  If you get fever, confusion or sudden abdominal pain, get medical attention urgently.  In case of any emergency, visit the nearest ED or call 911.

## 2024-08-31 NOTE — CONSULTS
IntraVENous PRN Zoila Payton MD        heparin (porcine) injection 5,000 Units  5,000 Units SubCUTAneous 3 times per day Zoila Payton MD   5,000 Units at 08/31/24 0533    ondansetron (ZOFRAN-ODT) disintegrating tablet 4 mg  4 mg Oral Q8H PRN Zoila Payton MD        Or    ondansetron (ZOFRAN) injection 4 mg  4 mg IntraVENous Q6H PRN Zoila Payton MD        polyethylene glycol (GLYCOLAX) packet 17 g  17 g Oral Daily PRN Zoila Payton MD        acetaminophen (TYLENOL) tablet 650 mg  650 mg Oral Q6H PRN Zoila Payton MD        Or    acetaminophen (TYLENOL) suppository 650 mg  650 mg Rectal Q6H PRN Zoila Payton MD        melatonin tablet 3 mg  3 mg Oral Nightly Zoila Payton MD        insulin lispro (HUMALOG,ADMELOG) injection vial 0-4 Units  0-4 Units SubCUTAneous TID WC Zoila Payton MD        insulin lispro (HUMALOG,ADMELOG) injection vial 0-4 Units  0-4 Units SubCUTAneous Nightly Zoila Payton MD        insulin glargine (LANTUS) injection vial 5 Units  5 Units SubCUTAneous Nightly Zoila Payton MD   5 Units at 08/30/24 1956    glucose chewable tablet 16 g  4 tablet Oral PRN Zoila Payton MD        dextrose bolus 10% 125 mL  125 mL IntraVENous PRN Zoila Payton MD        Or    dextrose bolus 10% 250 mL  250 mL IntraVENous PRN Zoila Payton MD        glucagon injection 1 mg  1 mg SubCUTAneous PRN Zoila Payton MD        dextrose 10 % infusion   IntraVENous Continuous PRZoila Ram MD        glucose chewable tablet 16 g  4 tablet Oral PRN Zoila Payton MD        dextrose bolus 10% 125 mL  125 mL IntraVENous PRZoila Ram MD        Or    dextrose bolus 10% 250 mL  250 mL IntraVENous PRZoila Ram MD        glucagon injection 1 mg  1 mg SubCUTAneous PRN Zoila Payton MD        dextrose 10 % infusion   IntraVENous Continuous PRN Zoila Payton MD        HYDROmorphone (DILAUDID) injection 0.25 mg  0.25 mg  IntraVENous Q4H PRN Zoila Payton MD        Or    HYDROmorphone (DILAUDID) injection 0.5 mg  0.5 mg IntraVENous Q4H PRN Zoila Payton MD   0.5 mg at 08/31/24 0930    sodium chloride flush 0.9 % injection 5-40 mL  5-40 mL IntraVENous 2 times per day Zoila Payton MD   10 mL at 08/31/24 0930    sodium chloride flush 0.9 % injection 5-40 mL  5-40 mL IntraVENous PRN Zoila Payton MD        0.9 % sodium chloride infusion   IntraVENous PRN Zoila Payton MD        sodium chloride 0.9 % bolus 100 mL  100 mL IntraVENous PRN Merrick Robertson MD        heparin (porcine) injection 3,200 Units  3,200 Units IntraCATHeter PRN Merrick Robertson MD           Current Medications:  Current Facility-Administered Medications   Medication Dose Route Frequency Provider Last Rate Last Admin    ALPRAZolam (XANAX) tablet 0.5 mg  0.5 mg Oral BID PRN Haseeb Jarquin MD   0.5 mg at 08/31/24 1011    atorvastatin (LIPITOR) tablet 40 mg  40 mg Oral Nightly Zoila Payton MD        cyclobenzaprine (FLEXERIL) tablet 5 mg  5 mg Oral Daily PRN Zoila Payton MD        busPIRone (BUSPAR) tablet 10 mg  10 mg Oral BID Zoila Payton MD   10 mg at 08/30/24 0801    sodium chloride flush 0.9 % injection 5-40 mL  5-40 mL IntraVENous 2 times per day Zoila Payton MD   10 mL at 08/30/24 1042    sodium chloride flush 0.9 % injection 5-40 mL  5-40 mL IntraVENous PRN Zoila Payton MD        0.9 % sodium chloride infusion   IntraVENous PRN Zoila Payton MD        heparin (porcine) injection 5,000 Units  5,000 Units SubCUTAneous 3 times per day Zoila Payton MD   5,000 Units at 08/31/24 0533    ondansetron (ZOFRAN-ODT) disintegrating tablet 4 mg  4 mg Oral Q8H PRN Zoila Payton MD        Or    ondansetron (ZOFRAN) injection 4 mg  4 mg IntraVENous Q6H PRN Zoila Payton MD        polyethylene glycol (GLYCOLAX) packet 17 g  17 g Oral Daily PRN Zoila Payton MD        acetaminophen (TYLENOL)

## 2024-08-31 NOTE — CONSULTS
Asked to evaluated for ascites. Patient with ascites with SAAG <1.1 and total protein >2.5. no cirrhosis on imaging. Liver enzymes normal. Platelets normal ruling out portal hypertension. No SBP based on ascites cell count/diff. This is not consistent with liver disease. Further workup by primary team. Outpatient EGD to eval thickened esophagus with Dr. Doss.  Consider infection, malignancy, nephrotic syndrome, CHF as causes. Call back with questions or concerns.

## 2024-09-01 ENCOUNTER — HOSPITAL ENCOUNTER (INPATIENT)
Age: 49
LOS: 5 days | Discharge: HOME | DRG: 947 | End: 2024-09-06
Attending: STUDENT IN AN ORGANIZED HEALTH CARE EDUCATION/TRAINING PROGRAM | Admitting: INTERNAL MEDICINE
Payer: COMMERCIAL

## 2024-09-01 DIAGNOSIS — J43.9 PULMONARY EMPHYSEMA, UNSPECIFIED EMPHYSEMA TYPE (HCC): ICD-10-CM

## 2024-09-01 DIAGNOSIS — I12.0 TYPE 2 DM WITH HYPERTENSION AND ESRD ON DIALYSIS (HCC): ICD-10-CM

## 2024-09-01 DIAGNOSIS — Z91.89 RISK OF MYOCARDIAL INFARCTION OR STROKE 7.5% OR GREATER IN NEXT 10 YEARS: ICD-10-CM

## 2024-09-01 DIAGNOSIS — R18.8 OTHER ASCITES: ICD-10-CM

## 2024-09-01 DIAGNOSIS — E11.22 TYPE 2 DM WITH HYPERTENSION AND ESRD ON DIALYSIS (HCC): ICD-10-CM

## 2024-09-01 DIAGNOSIS — R10.9 ABDOMINAL PAIN, UNSPECIFIED ABDOMINAL LOCATION: Primary | ICD-10-CM

## 2024-09-01 DIAGNOSIS — E11.42 POLYNEUROPATHY DUE TO TYPE 2 DIABETES MELLITUS (HCC): ICD-10-CM

## 2024-09-01 DIAGNOSIS — Z99.2 TYPE 2 DM WITH HYPERTENSION AND ESRD ON DIALYSIS (HCC): ICD-10-CM

## 2024-09-01 DIAGNOSIS — E78.5 TYPE 2 DIABETES MELLITUS WITH HYPERLIPIDEMIA (HCC): ICD-10-CM

## 2024-09-01 DIAGNOSIS — E11.69 TYPE 2 DIABETES MELLITUS WITH HYPERLIPIDEMIA (HCC): ICD-10-CM

## 2024-09-01 DIAGNOSIS — N18.6 TYPE 2 DM WITH HYPERTENSION AND ESRD ON DIALYSIS (HCC): ICD-10-CM

## 2024-09-01 PROBLEM — Z76.89: Status: ACTIVE | Noted: 2024-09-01

## 2024-09-01 LAB
ALBUMIN SERPL-MCNC: 3.5 G/DL (ref 3.4–5)
ALBUMIN/GLOB SERPL: 1.1 {RATIO} (ref 1.1–2.2)
ALP SERPL-CCNC: 105 U/L (ref 40–129)
ALT SERPL-CCNC: <5 U/L (ref 10–40)
ANION GAP SERPL CALCULATED.3IONS-SCNC: 21 MMOL/L (ref 3–16)
AST SERPL-CCNC: 12 U/L (ref 15–37)
BACTERIA FLD AEROBE CULT: NORMAL
BACTERIA SPEC ANAEROBE CULT: NORMAL
BASOPHILS # BLD: 0 K/UL (ref 0–0.2)
BASOPHILS NFR BLD: 0.2 %
BILIRUB SERPL-MCNC: 0.7 MG/DL (ref 0–1)
BUN SERPL-MCNC: 61 MG/DL (ref 7–20)
CALCIUM SERPL-MCNC: 8.3 MG/DL (ref 8.3–10.6)
CHLORIDE SERPL-SCNC: 88 MMOL/L (ref 99–110)
CO2 SERPL-SCNC: 24 MMOL/L (ref 21–32)
CREAT SERPL-MCNC: 8.4 MG/DL (ref 0.6–1.1)
DEPRECATED RDW RBC AUTO: 16.1 % (ref 12.4–15.4)
EOSINOPHIL # BLD: 0 K/UL (ref 0–0.6)
EOSINOPHIL NFR BLD: 0.4 %
GFR SERPLBLD CREATININE-BSD FMLA CKD-EPI: 5 ML/MIN/{1.73_M2}
GLUCOSE BLD-MCNC: 103 MG/DL (ref 70–99)
GLUCOSE SERPL-MCNC: 133 MG/DL (ref 70–99)
GRAM STN SPEC: NORMAL
HCT VFR BLD AUTO: 34.4 % (ref 36–48)
HGB BLD-MCNC: 11.3 G/DL (ref 12–16)
INR PPP: 1.17 (ref 0.85–1.15)
LACTATE BLDV-SCNC: 0.9 MMOL/L (ref 0.4–1.9)
LIPASE SERPL-CCNC: 21 U/L (ref 13–60)
LYMPHOCYTES # BLD: 0.9 K/UL (ref 1–5.1)
LYMPHOCYTES NFR BLD: 8.9 %
MCH RBC QN AUTO: 27.4 PG (ref 26–34)
MCHC RBC AUTO-ENTMCNC: 32.9 G/DL (ref 31–36)
MCV RBC AUTO: 83.3 FL (ref 80–100)
MONOCYTES # BLD: 0.5 K/UL (ref 0–1.3)
MONOCYTES NFR BLD: 5.4 %
NEUTROPHILS # BLD: 8.4 K/UL (ref 1.7–7.7)
NEUTROPHILS NFR BLD: 85.1 %
PERFORMED ON: ABNORMAL
PLATELET # BLD AUTO: 210 K/UL (ref 135–450)
PMV BLD AUTO: 7.3 FL (ref 5–10.5)
POTASSIUM SERPL-SCNC: 4.8 MMOL/L (ref 3.5–5.1)
PROT SERPL-MCNC: 6.6 G/DL (ref 6.4–8.2)
PROTHROMBIN TIME: 15.2 SEC (ref 11.9–14.9)
RBC # BLD AUTO: 4.12 M/UL (ref 4–5.2)
SODIUM SERPL-SCNC: 133 MMOL/L (ref 136–145)
WBC # BLD AUTO: 9.8 K/UL (ref 4–11)

## 2024-09-01 PROCEDURE — 6360000002 HC RX W HCPCS

## 2024-09-01 PROCEDURE — 96374 THER/PROPH/DIAG INJ IV PUSH: CPT

## 2024-09-01 PROCEDURE — 93005 ELECTROCARDIOGRAM TRACING: CPT

## 2024-09-01 PROCEDURE — G0378 HOSPITAL OBSERVATION PER HR: HCPCS

## 2024-09-01 PROCEDURE — 89051 BODY FLUID CELL COUNT: CPT

## 2024-09-01 PROCEDURE — 85610 PROTHROMBIN TIME: CPT

## 2024-09-01 PROCEDURE — 2580000003 HC RX 258

## 2024-09-01 PROCEDURE — 80053 COMPREHEN METABOLIC PANEL: CPT

## 2024-09-01 PROCEDURE — 99285 EMERGENCY DEPT VISIT HI MDM: CPT

## 2024-09-01 PROCEDURE — 6370000000 HC RX 637 (ALT 250 FOR IP)

## 2024-09-01 PROCEDURE — 96375 TX/PRO/DX INJ NEW DRUG ADDON: CPT

## 2024-09-01 PROCEDURE — 83690 ASSAY OF LIPASE: CPT

## 2024-09-01 PROCEDURE — 85025 COMPLETE CBC W/AUTO DIFF WBC: CPT

## 2024-09-01 PROCEDURE — 83605 ASSAY OF LACTIC ACID: CPT

## 2024-09-01 PROCEDURE — 1200000000 HC SEMI PRIVATE

## 2024-09-01 RX ORDER — SODIUM CHLORIDE 0.9 % (FLUSH) 0.9 %
5-40 SYRINGE (ML) INJECTION PRN
Status: DISCONTINUED | OUTPATIENT
Start: 2024-09-01 | End: 2024-09-06 | Stop reason: HOSPADM

## 2024-09-01 RX ORDER — AMLODIPINE BESYLATE 10 MG/1
10 TABLET ORAL DAILY
Status: DISCONTINUED | OUTPATIENT
Start: 2024-09-01 | End: 2024-09-06 | Stop reason: HOSPADM

## 2024-09-01 RX ORDER — CLONIDINE HYDROCHLORIDE 0.2 MG/1
0.2 TABLET ORAL ONCE
Status: DISCONTINUED | OUTPATIENT
Start: 2024-09-01 | End: 2024-09-01

## 2024-09-01 RX ORDER — ALBUTEROL SULFATE 0.83 MG/ML
2.5 SOLUTION RESPIRATORY (INHALATION) EVERY 6 HOURS PRN
Status: DISCONTINUED | OUTPATIENT
Start: 2024-09-01 | End: 2024-09-06 | Stop reason: HOSPADM

## 2024-09-01 RX ORDER — HYDROMORPHONE HYDROCHLORIDE 1 MG/ML
1 INJECTION, SOLUTION INTRAMUSCULAR; INTRAVENOUS; SUBCUTANEOUS ONCE
Status: DISCONTINUED | OUTPATIENT
Start: 2024-09-01 | End: 2024-09-01

## 2024-09-01 RX ORDER — ALBUMIN (HUMAN) 12.5 G/50ML
25 SOLUTION INTRAVENOUS
Status: ACTIVE | OUTPATIENT
Start: 2024-09-01 | End: 2024-09-02

## 2024-09-01 RX ORDER — HEPARIN SODIUM 5000 [USP'U]/ML
5000 INJECTION, SOLUTION INTRAVENOUS; SUBCUTANEOUS EVERY 8 HOURS SCHEDULED
Status: DISCONTINUED | OUTPATIENT
Start: 2024-09-01 | End: 2024-09-06 | Stop reason: HOSPADM

## 2024-09-01 RX ORDER — CLONIDINE HYDROCHLORIDE 0.2 MG/1
0.2 TABLET ORAL 3 TIMES DAILY
Status: DISCONTINUED | OUTPATIENT
Start: 2024-09-01 | End: 2024-09-06 | Stop reason: HOSPADM

## 2024-09-01 RX ORDER — INSULIN GLARGINE 100 [IU]/ML
5 INJECTION, SOLUTION SUBCUTANEOUS NIGHTLY
Status: DISCONTINUED | OUTPATIENT
Start: 2024-09-01 | End: 2024-09-06 | Stop reason: HOSPADM

## 2024-09-01 RX ORDER — ONDANSETRON 4 MG/1
4 TABLET, ORALLY DISINTEGRATING ORAL EVERY 8 HOURS PRN
Status: DISCONTINUED | OUTPATIENT
Start: 2024-09-01 | End: 2024-09-06 | Stop reason: HOSPADM

## 2024-09-01 RX ORDER — HYDROMORPHONE HYDROCHLORIDE 1 MG/ML
1 INJECTION, SOLUTION INTRAMUSCULAR; INTRAVENOUS; SUBCUTANEOUS ONCE
Status: COMPLETED | OUTPATIENT
Start: 2024-09-01 | End: 2024-09-01

## 2024-09-01 RX ORDER — ALBUTEROL SULFATE 90 UG/1
2 AEROSOL, METERED RESPIRATORY (INHALATION) EVERY 6 HOURS PRN
Status: DISCONTINUED | OUTPATIENT
Start: 2024-09-01 | End: 2024-09-01

## 2024-09-01 RX ORDER — ASPIRIN 81 MG/1
81 TABLET ORAL DAILY
Status: DISCONTINUED | OUTPATIENT
Start: 2024-09-01 | End: 2024-09-06 | Stop reason: HOSPADM

## 2024-09-01 RX ORDER — PREGABALIN 75 MG/1
75 CAPSULE ORAL DAILY
Status: DISCONTINUED | OUTPATIENT
Start: 2024-09-01 | End: 2024-09-06 | Stop reason: HOSPADM

## 2024-09-01 RX ORDER — MIDODRINE HYDROCHLORIDE 5 MG/1
5 TABLET ORAL
Status: DISCONTINUED | OUTPATIENT
Start: 2024-09-01 | End: 2024-09-06 | Stop reason: HOSPADM

## 2024-09-01 RX ORDER — DEXTROSE MONOHYDRATE 100 MG/ML
INJECTION, SOLUTION INTRAVENOUS CONTINUOUS PRN
Status: DISCONTINUED | OUTPATIENT
Start: 2024-09-01 | End: 2024-09-06 | Stop reason: HOSPADM

## 2024-09-01 RX ORDER — ACETAMINOPHEN 650 MG/1
650 SUPPOSITORY RECTAL EVERY 6 HOURS PRN
Status: DISCONTINUED | OUTPATIENT
Start: 2024-09-01 | End: 2024-09-06 | Stop reason: HOSPADM

## 2024-09-01 RX ORDER — HEPARIN SODIUM 5000 [USP'U]/ML
5000 INJECTION, SOLUTION INTRAVENOUS; SUBCUTANEOUS ONCE
Status: DISCONTINUED | OUTPATIENT
Start: 2024-09-01 | End: 2024-09-01

## 2024-09-01 RX ORDER — ONDANSETRON 2 MG/ML
4 INJECTION INTRAMUSCULAR; INTRAVENOUS ONCE
Status: DISCONTINUED | OUTPATIENT
Start: 2024-09-01 | End: 2024-09-01

## 2024-09-01 RX ORDER — SODIUM CHLORIDE 9 MG/ML
INJECTION, SOLUTION INTRAVENOUS PRN
Status: DISCONTINUED | OUTPATIENT
Start: 2024-09-01 | End: 2024-09-06 | Stop reason: HOSPADM

## 2024-09-01 RX ORDER — CYCLOBENZAPRINE HCL 10 MG
5 TABLET ORAL DAILY PRN
Status: DISCONTINUED | OUTPATIENT
Start: 2024-09-01 | End: 2024-09-06 | Stop reason: HOSPADM

## 2024-09-01 RX ORDER — INSULIN LISPRO 100 [IU]/ML
0-4 INJECTION, SOLUTION INTRAVENOUS; SUBCUTANEOUS NIGHTLY
Status: DISCONTINUED | OUTPATIENT
Start: 2024-09-01 | End: 2024-09-06 | Stop reason: HOSPADM

## 2024-09-01 RX ORDER — ATORVASTATIN CALCIUM 40 MG/1
40 TABLET, FILM COATED ORAL NIGHTLY
Status: DISCONTINUED | OUTPATIENT
Start: 2024-09-01 | End: 2024-09-06 | Stop reason: HOSPADM

## 2024-09-01 RX ORDER — SODIUM CHLORIDE 0.9 % (FLUSH) 0.9 %
5-40 SYRINGE (ML) INJECTION EVERY 12 HOURS SCHEDULED
Status: DISCONTINUED | OUTPATIENT
Start: 2024-09-01 | End: 2024-09-06 | Stop reason: HOSPADM

## 2024-09-01 RX ORDER — ALPRAZOLAM 0.5 MG
2 TABLET ORAL DAILY
Status: DISCONTINUED | OUTPATIENT
Start: 2024-09-01 | End: 2024-09-02

## 2024-09-01 RX ORDER — HYDROMORPHONE HYDROCHLORIDE 1 MG/ML
0.25 INJECTION, SOLUTION INTRAMUSCULAR; INTRAVENOUS; SUBCUTANEOUS
Status: DISCONTINUED | OUTPATIENT
Start: 2024-09-01 | End: 2024-09-03

## 2024-09-01 RX ORDER — ACETAMINOPHEN 325 MG/1
650 TABLET ORAL EVERY 6 HOURS PRN
Status: DISCONTINUED | OUTPATIENT
Start: 2024-09-01 | End: 2024-09-06 | Stop reason: HOSPADM

## 2024-09-01 RX ORDER — INSULIN LISPRO 100 [IU]/ML
0-4 INJECTION, SOLUTION INTRAVENOUS; SUBCUTANEOUS
Status: DISCONTINUED | OUTPATIENT
Start: 2024-09-01 | End: 2024-09-06 | Stop reason: HOSPADM

## 2024-09-01 RX ORDER — HYDROMORPHONE HYDROCHLORIDE 1 MG/ML
0.5 INJECTION, SOLUTION INTRAMUSCULAR; INTRAVENOUS; SUBCUTANEOUS
Status: DISCONTINUED | OUTPATIENT
Start: 2024-09-01 | End: 2024-09-03

## 2024-09-01 RX ORDER — LOSARTAN POTASSIUM 25 MG/1
100 TABLET ORAL DAILY
Status: DISCONTINUED | OUTPATIENT
Start: 2024-09-01 | End: 2024-09-06

## 2024-09-01 RX ORDER — ONDANSETRON 2 MG/ML
4 INJECTION INTRAMUSCULAR; INTRAVENOUS ONCE
Status: COMPLETED | OUTPATIENT
Start: 2024-09-01 | End: 2024-09-01

## 2024-09-01 RX ORDER — GLUCAGON 1 MG/ML
1 KIT INJECTION PRN
Status: DISCONTINUED | OUTPATIENT
Start: 2024-09-01 | End: 2024-09-06 | Stop reason: HOSPADM

## 2024-09-01 RX ORDER — TORSEMIDE 20 MG/1
20 TABLET ORAL 2 TIMES DAILY
Status: DISCONTINUED | OUTPATIENT
Start: 2024-09-01 | End: 2024-09-06 | Stop reason: HOSPADM

## 2024-09-01 RX ORDER — POLYETHYLENE GLYCOL 3350 17 G/17G
17 POWDER, FOR SOLUTION ORAL DAILY PRN
Status: DISCONTINUED | OUTPATIENT
Start: 2024-09-01 | End: 2024-09-06 | Stop reason: HOSPADM

## 2024-09-01 RX ORDER — BUSPIRONE HYDROCHLORIDE 10 MG/1
10 TABLET ORAL 2 TIMES DAILY
Status: DISCONTINUED | OUTPATIENT
Start: 2024-09-01 | End: 2024-09-06 | Stop reason: HOSPADM

## 2024-09-01 RX ORDER — ONDANSETRON 2 MG/ML
4 INJECTION INTRAMUSCULAR; INTRAVENOUS EVERY 6 HOURS PRN
Status: DISCONTINUED | OUTPATIENT
Start: 2024-09-01 | End: 2024-09-06 | Stop reason: HOSPADM

## 2024-09-01 RX ADMIN — TORSEMIDE 20 MG: 20 TABLET ORAL at 22:23

## 2024-09-01 RX ADMIN — PREGABALIN 75 MG: 75 CAPSULE ORAL at 23:15

## 2024-09-01 RX ADMIN — INSULIN GLARGINE 5 UNITS: 100 INJECTION, SOLUTION SUBCUTANEOUS at 22:21

## 2024-09-01 RX ADMIN — HYDROMORPHONE HYDROCHLORIDE 1 MG: 1 INJECTION, SOLUTION INTRAMUSCULAR; INTRAVENOUS; SUBCUTANEOUS at 15:14

## 2024-09-01 RX ADMIN — WATER 2000 MG: 1 INJECTION INTRAMUSCULAR; INTRAVENOUS; SUBCUTANEOUS at 16:34

## 2024-09-01 RX ADMIN — ONDANSETRON 4 MG: 2 INJECTION INTRAMUSCULAR; INTRAVENOUS at 15:13

## 2024-09-01 RX ADMIN — BUSPIRONE HYDROCHLORIDE 10 MG: 10 TABLET ORAL at 22:23

## 2024-09-01 RX ADMIN — ALPRAZOLAM 2 MG: 0.5 TABLET ORAL at 22:22

## 2024-09-01 RX ADMIN — CLONIDINE HYDROCHLORIDE 0.2 MG: 0.2 TABLET ORAL at 22:23

## 2024-09-01 RX ADMIN — SODIUM CHLORIDE, PRESERVATIVE FREE 10 ML: 5 INJECTION INTRAVENOUS at 21:00

## 2024-09-01 RX ADMIN — HEPARIN SODIUM 5000 UNITS: 5000 INJECTION INTRAVENOUS; SUBCUTANEOUS at 22:25

## 2024-09-01 RX ADMIN — HYDROMORPHONE HYDROCHLORIDE 0.5 MG: 1 INJECTION, SOLUTION INTRAMUSCULAR; INTRAVENOUS; SUBCUTANEOUS at 19:26

## 2024-09-01 RX ADMIN — HYDROMORPHONE HYDROCHLORIDE 0.5 MG: 1 INJECTION, SOLUTION INTRAMUSCULAR; INTRAVENOUS; SUBCUTANEOUS at 23:15

## 2024-09-01 ASSESSMENT — PAIN DESCRIPTION - PAIN TYPE
TYPE: ACUTE PAIN
TYPE: ACUTE PAIN

## 2024-09-01 ASSESSMENT — PAIN SCALES - GENERAL
PAINLEVEL_OUTOF10: 8
PAINLEVEL_OUTOF10: 3
PAINLEVEL_OUTOF10: 2
PAINLEVEL_OUTOF10: 5
PAINLEVEL_OUTOF10: 2
PAINLEVEL_OUTOF10: 3
PAINLEVEL_OUTOF10: 0
PAINLEVEL_OUTOF10: 7
PAINLEVEL_OUTOF10: 10

## 2024-09-01 ASSESSMENT — PAIN DESCRIPTION - DESCRIPTORS
DESCRIPTORS: CRAMPING;ACHING
DESCRIPTORS: ACHING

## 2024-09-01 ASSESSMENT — PAIN DESCRIPTION - LOCATION
LOCATION: ABDOMEN;BACK;PELVIS
LOCATION: BACK;ABDOMEN
LOCATION: ABDOMEN;BACK;PELVIS
LOCATION: ABDOMEN;BACK
LOCATION: ABDOMEN;BACK;PELVIS

## 2024-09-01 ASSESSMENT — PAIN - FUNCTIONAL ASSESSMENT
PAIN_FUNCTIONAL_ASSESSMENT: ACTIVITIES ARE NOT PREVENTED
PAIN_FUNCTIONAL_ASSESSMENT: PREVENTS OR INTERFERES SOME ACTIVE ACTIVITIES AND ADLS

## 2024-09-01 ASSESSMENT — PAIN DESCRIPTION - FREQUENCY
FREQUENCY: CONTINUOUS
FREQUENCY: CONTINUOUS

## 2024-09-01 ASSESSMENT — LIFESTYLE VARIABLES
HOW OFTEN DO YOU HAVE A DRINK CONTAINING ALCOHOL: NEVER
HOW OFTEN DO YOU HAVE A DRINK CONTAINING ALCOHOL: NEVER
HOW MANY STANDARD DRINKS CONTAINING ALCOHOL DO YOU HAVE ON A TYPICAL DAY: PATIENT DOES NOT DRINK

## 2024-09-01 ASSESSMENT — PAIN DESCRIPTION - ONSET
ONSET: ON-GOING
ONSET: ON-GOING

## 2024-09-01 ASSESSMENT — PAIN DESCRIPTION - ORIENTATION
ORIENTATION: RIGHT;LEFT
ORIENTATION: LOWER;MID
ORIENTATION: RIGHT;LEFT

## 2024-09-01 NOTE — PROGRESS NOTES
ED TO INPATIENT SBAR HANDOFF    Patient Name: Kristine Ramirez   :  1975  49 y.o.   MRN:  [id number]  Preferred Name  Kristine Ramirez  ED Room #:  A07/A07-07  Family/Caregiver Present yes   Restraints no   Sitter no   Sepsis Risk Score      Situation  Code Status: Prior No additional code details.    Allergies: Amoxicillin, Levofloxacin, Vancomycin, and Tape [adhesive tape]  Weight: Patient Vitals for the past 96 hrs (Last 3 readings):   Weight   24 1425 69.6 kg (153 lb 6.4 oz)     Arrived from: home  Chief Complaint:   Chief Complaint   Patient presents with    Abdominal Pain     Pt presents the ED due to abd pain and back pain. Pt states that she was admitted here recently and had a paracentesis that drained \"18 kg\". Pt states the fluid has now come back.      Hospital Problem/Diagnosis:  Principal Problem:    Encounter for assessment of ascites  Resolved Problems:    * No resolved hospital problems. *    Imaging:   No orders to display     Abnormal labs:   Abnormal Labs Reviewed   CBC WITH AUTO DIFFERENTIAL - Abnormal; Notable for the following components:       Result Value    Hemoglobin 11.3 (*)     Hematocrit 34.4 (*)     RDW 16.1 (*)     Neutrophils Absolute 8.4 (*)     Lymphocytes Absolute 0.9 (*)     All other components within normal limits   COMPREHENSIVE METABOLIC PANEL W/ REFLEX TO MG FOR LOW K - Abnormal; Notable for the following components:    Sodium 133 (*)     Chloride 88 (*)     Anion Gap 21 (*)     Glucose 133 (*)     BUN 61 (*)     Creatinine 8.4 (*)     Est, Glom Filt Rate 5 (*)     ALT <5 (*)     AST 12 (*)     All other components within normal limits    Narrative:     CALL  [facility] tel. [phone],  Previous panic on this admission - call not needed per SOP, 2024 15:30,  by AYDE     Critical values: yes     Abnormal Assessment Findings: Cr 8.4    Background  History:   Past Medical History:   Diagnosis Date    Acute respiratory failure due to COVID-19 (HCC) 10/16/2021     Arterial ischemic stroke, ICA, left, acute ([facility])     Blind in both eyes     Cerebral artery occlusion with cerebral infarction ([facility])     CHF (congestive heart failure) ([facility])     Chronic kidney disease     COPD (chronic obstructive pulmonary disease) ([facility])     Depression     Diabetes mellitus out of control     Diabetes mellitus, type II ([facility])     2005    Diabetic neuropathy associated with type 2 diabetes mellitus ([facility])     Generalized headaches     Hypertension     Infertility     Insomnia     chronic vs lack of time spent to sleep    Migraine headache 11/09/2011    Mixed hyperlipidemia     Otitis media     h/o recurrent    Pelvic abscess in female 10/05/2013    Pneumonia     2004 approx.    Stroke ([facility]) 08/27/2020    Stroke ([facility]) 08/27/2021       Assessment    Vitals/MEWS:        Vitals:    09/01/24 1530 09/01/24 1600 09/01/24 1630 09/01/24 1700   BP: (!) 161/76 (!) 155/94 (!) 171/81 (!) 165/82   Pulse:   (!) 102 (!) 104   Temp:    98.1 °F (36.7 °C)   TempSrc:    Oral   SpO2: 95%  98% 98%   Weight:       Height:         FiO2 (%):   O2 Flow Rate:      Cardiac Rhythm:    Pain Assessment:  [x] Verbal [] Yen Diaz Scale  Pain Scale: Pain Assessment  Pain Assessment: 0-10  Pain Level: 2  Patient's Stated Pain Goal: 2  Pain Location: Abdomen, Back, Pelvis  Last documented pain score (0-10 scale) Pain Level: 2  Last documented pain medication administered: 1514 Dilaudid 1mg  Mental Status: oriented and alert  Orientation Level:    NIH Score:    C-SSRS: Risk of Suicide: No Risk  Bedside swallow:    Elver Coma Scale (GCS):    Active LDA's:   Peripheral IV 08/29/24 Left Forearm (Active)       Peripheral IV 09/01/24 Left Forearm (Active)                 PO Status: Nothing by Mouth  Pertinent or High Risk Medications/Drips: no   If Yes, please provide details:   Pending Blood Product Administration: no       You may also review the ED PT Care Timeline found under the Summary Nursing Index

## 2024-09-01 NOTE — PROGRESS NOTES
Patient admitted tot he floor from the ED.  Patient was just discharged from our floor yesterday and is familiar with the call light and room.  Patient states her pain is at 3 out of 10 right now and is tolerable.  Abdomen is very round and distended.  Placed on heart monitor.  Glucose  is 109.  Dinner ordered

## 2024-09-01 NOTE — H&P
Internal Medicine H&P    Date: 2024   Patient: Kristine Ramirez   Patient : 1975     CC: Abdominal Pain (Pt presents the ED due to abd pain and back pain. Pt states that she was admitted here recently and had a paracentesis that drained \"18 kg\". Pt states the fluid has now come back. 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    She was recently admitted 2 days back due to massive ascites(started developing in the last November) with abdominal pain that has been going on for 3 months. Workup was done at hospital which revealed no apparent cause of ascites.  Paracentesis with 17.5 L of fluid removal was done.  Analysis of the fluid showed SAAG of 0.3, ruling out portal hypertension.  Protein was 5.  And WBC count was 268. liver was normal.  No obvious mass found on CT abdomen pelvis.  Her beta-hCG on last admission was falsely positive, likely due to ascites.  Gynecologic oncology was consulted which wanted outpatient follow-up.  Gastroenterology was also consulted and they had nothing to offer.  was slightly elevated. CEA -.  pending. Cytology pending.  IR did paracentesis and patient was feeling much better and was discharged to home with outpatient follow-up with Gyn onc.     Patient reports that after going to home and having dinner she slept.  During her sleep she had diarrhea without awareness.  Her  woke her up.  After the diarrhea she also started to have pain and felt that her abdomen is getting bigger so she came to emergency department.    Patient was well-oriented to time place person .not confused at all . on examination the physical exam is remarkable for distended abdomen but it is soft in consistency.  She denies fever and chills. There is no leukocytosis.  Labs  were unremarkable including normal lactate.  PMHx:      Diagnosis Date    Acute respiratory failure due to COVID-19 ([facility]) 10/16/2021    Arterial ischemic stroke, ICA, left, acute ([facility])     Blind in both eyes     Cerebral artery occlusion with cerebral infarction ([facility])     CHF (congestive heart failure) ([facility])     Chronic kidney disease     COPD (chronic obstructive pulmonary disease) ([facility])     Depression     Diabetes mellitus out of control     Diabetes mellitus, type II ([facility])     2005    Diabetic neuropathy associated with type 2 diabetes mellitus ([facility])     Generalized headaches     Hypertension     Infertility     Insomnia     chronic vs lack of time spent to sleep    Migraine headache 11/09/2011    Mixed hyperlipidemia     Otitis media     h/o recurrent    Pelvic abscess in female 10/05/2013    Pneumonia     2004 approx.    Stroke ([facility]) 08/27/2020    Stroke ([facility]) 08/27/2021       PSurgHx:      Procedure Laterality Date    CERVIX SURGERY      laser tx for dysplasia;1992    DIALYSIS FISTULA CREATION Right 2/10/2022    RIGHT BRACHIO CEPHALIC FISTULA CREATION performed by Christiano Lomeli II, MD at [facility] OR    EYE SURGERY      FOOT SURGERY Right     FOOT SURGERY Bilateral     FOOT SURGERY Left     IR TUNNELED CATHETER PLACEMENT GREATER THAN 5 YEARS  9/7/2021    IR TUNNELED CATHETER PLACEMENT GREATER THAN 5 YEARS 9/7/2021 Omari Carson MD [facility] SPECIAL PROCEDURES        Medication:  Medications Prior to Admission: insulin glargine (LANTUS;BASAGLAR) 100 UNIT/ML injection pen, Inject 5 Units into the skin nightly  ALPRAZolam (ALPRAZOLAM XR) 2 MG extended release tablet, Take 1 tablet by mouth every morning for 90 days. Max Daily Amount: 2 mg  cloNIDine (CATAPRES) 0.2 MG tablet, TAKE 1 TABLET BY MOUTH EVERY MORNING , THEN TAKE 1 TABLET BY MOUTH AT NOON THEN TAKE 1 TABLET BY MOUTH EVERY NIGHT AT BEDTIME (Patient taking differently: Take 1 tablet by mouth See Admin Instructions Morning, noon, and

## 2024-09-01 NOTE — ED PROVIDER NOTES
THE [facility]  EMERGENCY DEPARTMENT ENCOUNTER          EM RESIDENT NOTE       Date of evaluation: 9/1/2024    Chief Complaint     Abdominal Pain (Pt presents the ED due to abd pain and back pain. Pt states that she was admitted here recently and had a paracentesis that drained \"18 kg\". Pt states the fluid has now come back. )      History of Present Illness     Kristine Ramirez is a 49 y.o. 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  Patient states she was discharged last night.  Over the course of the night she has had increasing abdominal pain, distention with concerns of reaccumulation of her ascites.  She denies any vomiting.  Endorses some diarrhea.  Was able to tolerate dinner last night but has not had any additional meals today.  Denies any recent fevers but endorses chills.  Last dialysis session was 2 days ago on Friday 8/30    MEDICAL DECISION MAKING / ASSESSMENT / PLAN     INITIAL VITALS: BP: 128/78, Temp: 98 °F (36.7 °C), Pulse: 99,  , SpO2: 100 %    Kristine Ramirez is a 49 y.o. female with history noted above who presented to the emergency department 1 day after discharge for abdominal distention, pain.  Was discharged yesterday for new large volume ascites status post 18 L drainage. Overnight was having worsening abdominal pain, nausea and worsening abdominal distention. On exam she is tachycardic to 102. Afebrile. Given Rocephin for SBP coverage. Has significant abdominal distention and fluid wave concerning for reaccumulation. I think she would benefit from fluid removal. She is an ESRD patient on dialysis, last session was on Friday.    Is this patient to be included in the SEP-1 core measure? No Exclusion criteria - the patient is NOT to be included for SEP-1 Core Measure due to: 2+ SIRS criteria are not

## 2024-09-01 NOTE — ED PROVIDER NOTES
ED Attending Attestation Note     Date of evaluation: 9/1/2024    This patient was seen by the resident.  I have seen and examined the patient, agree with the workup, evaluation, management and diagnosis. The care plan has been discussed.  My assessment reveals a chronic ill-appearing female who presents emergency department with rapid accumulation of ascites.  On chart review she was recently admitted with concern for malignant ascites but with normal liver function.  Patient on my evaluation has a fluid wave present.  Has been reporting chills and tactile fevers at home.  Will proceed with laboratory evaluation, discussed empiric antibiotic coverage with pharmacy and plan for admission for therapeutic paracentesis.     Maricruz Mendosa MD  09/01/24 4085

## 2024-09-01 NOTE — PROGRESS NOTES
Clinical Pharmacy Consult Note  Medication History     Admit Date: 9/1/2024    List of of current medications patient is taking is complete. Home Medication list in EPIC updated to reflect changes noted below.    Source of information: Patient, patient's family member, dispense report     Patient's home pharmacy: [facility] [id number] - [place] [id number] [place] [phone] - F [phone]      Changes made to medication list:     Medications removed:   Aspirin 81 mg tablet - patient no longer taking  Atorvastatin 40 mg tablet - patient no longer taking, said it makes her feel sick   Glucagon 1 mg injection - patient not taking     Medications added:   None     Medication doses adjusted:   None     Other notes:   Patient reports that she has not taken any of her medications regularly for the past two months due to ongoing nausea and vomiting. She was able to confirm the list of medications she is supposed to be on, but not the exact date of last doses as it has been a while. Some medications are overdue for refill, but that is also because she is currently unable to take them regularly.   Albuterol  mcg/act - per dispense report last filled 07/06/2023, patient reports that she has \"a backlog\" of inhalers at home, and that she doesn't often use the inhaler   Dulaglutide 3 mg/0.5 mL injection - patient reports that she usually does her dose on Friday, but that most recent dose as of 09/01/2024 was Saturday 08/31/2024 because she was in the hospital  Heparin porcine 5000 unit/mL injection - patient reports she only takes this medication on days she is having dialysis (Monday, Wednesday, Friday)   Insulin glargine 100 unit/mL injection - patient reports that she recently reduced her dose from 10 units nightly to 5 units nightly per her physician. She also reports that she does not take it every night, stated she feels her dulaglutide is effective enough without it.     Current Outpatient Medications

## 2024-09-02 LAB
ANION GAP SERPL CALCULATED.3IONS-SCNC: 19 MMOL/L (ref 3–16)
BACTERIA FLD AEROBE CULT: NORMAL
BASOPHILS # BLD: 0 K/UL (ref 0–0.2)
BASOPHILS NFR BLD: 0.4 %
BUN SERPL-MCNC: 67 MG/DL (ref 7–20)
CALCIUM SERPL-MCNC: 7.5 MG/DL (ref 8.3–10.6)
CHLORIDE SERPL-SCNC: 90 MMOL/L (ref 99–110)
CO2 SERPL-SCNC: 23 MMOL/L (ref 21–32)
CREAT SERPL-MCNC: 9.9 MG/DL (ref 0.6–1.1)
DEPRECATED RDW RBC AUTO: 16.1 % (ref 12.4–15.4)
EKG ATRIAL RATE: 110 BPM
EKG DIAGNOSIS: NORMAL
EKG P AXIS: 39 DEGREES
EKG P-R INTERVAL: 126 MS
EKG Q-T INTERVAL: 334 MS
EKG QRS DURATION: 76 MS
EKG QTC CALCULATION (BAZETT): 452 MS
EKG R AXIS: 30 DEGREES
EKG T AXIS: 81 DEGREES
EKG VENTRICULAR RATE: 110 BPM
EOSINOPHIL # BLD: 0.1 K/UL (ref 0–0.6)
EOSINOPHIL NFR BLD: 1.1 %
GFR SERPLBLD CREATININE-BSD FMLA CKD-EPI: 4 ML/MIN/{1.73_M2}
GLUCOSE BLD-MCNC: 154 MG/DL (ref 70–99)
GLUCOSE BLD-MCNC: 166 MG/DL (ref 70–99)
GLUCOSE BLD-MCNC: 93 MG/DL (ref 70–99)
GLUCOSE SERPL-MCNC: 130 MG/DL (ref 70–99)
GRAM STN SPEC: NORMAL
HCT VFR BLD AUTO: 28.3 % (ref 36–48)
HGB BLD-MCNC: 9.3 G/DL (ref 12–16)
LYMPHOCYTES # BLD: 0.8 K/UL (ref 1–5.1)
LYMPHOCYTES NFR BLD: 11.6 %
MCH RBC QN AUTO: 27.9 PG (ref 26–34)
MCHC RBC AUTO-ENTMCNC: 32.8 G/DL (ref 31–36)
MCV RBC AUTO: 85 FL (ref 80–100)
MONOCYTES # BLD: 0.5 K/UL (ref 0–1.3)
MONOCYTES NFR BLD: 7.2 %
NEUTROPHILS # BLD: 5.6 K/UL (ref 1.7–7.7)
NEUTROPHILS NFR BLD: 79.7 %
PERFORMED ON: ABNORMAL
PERFORMED ON: ABNORMAL
PERFORMED ON: NORMAL
PLATELET # BLD AUTO: 181 K/UL (ref 135–450)
PMV BLD AUTO: 7.5 FL (ref 5–10.5)
POTASSIUM SERPL-SCNC: 4.8 MMOL/L (ref 3.5–5.1)
RBC # BLD AUTO: 3.33 M/UL (ref 4–5.2)
SODIUM SERPL-SCNC: 132 MMOL/L (ref 136–145)
WBC # BLD AUTO: 7 K/UL (ref 4–11)

## 2024-09-02 PROCEDURE — 2580000003 HC RX 258

## 2024-09-02 PROCEDURE — 1200000000 HC SEMI PRIVATE

## 2024-09-02 PROCEDURE — 80048 BASIC METABOLIC PNL TOTAL CA: CPT

## 2024-09-02 PROCEDURE — 6360000002 HC RX W HCPCS

## 2024-09-02 PROCEDURE — 6360000002 HC RX W HCPCS: Performed by: INTERNAL MEDICINE

## 2024-09-02 PROCEDURE — G0378 HOSPITAL OBSERVATION PER HR: HCPCS

## 2024-09-02 PROCEDURE — 90935 HEMODIALYSIS ONE EVALUATION: CPT

## 2024-09-02 PROCEDURE — 6370000000 HC RX 637 (ALT 250 FOR IP)

## 2024-09-02 PROCEDURE — 36415 COLL VENOUS BLD VENIPUNCTURE: CPT

## 2024-09-02 PROCEDURE — 85025 COMPLETE CBC W/AUTO DIFF WBC: CPT

## 2024-09-02 PROCEDURE — 5A1D70Z PERFORMANCE OF URINARY FILTRATION, INTERMITTENT, LESS THAN 6 HOURS PER DAY: ICD-10-PCS | Performed by: INTERNAL MEDICINE

## 2024-09-02 RX ORDER — ALPRAZOLAM 0.5 MG
1 TABLET ORAL 2 TIMES DAILY
Status: DISCONTINUED | OUTPATIENT
Start: 2024-09-02 | End: 2024-09-03

## 2024-09-02 RX ORDER — HEPARIN SODIUM 1000 [USP'U]/ML
3200 INJECTION, SOLUTION INTRAVENOUS; SUBCUTANEOUS PRN
Status: DISCONTINUED | OUTPATIENT
Start: 2024-09-02 | End: 2024-09-06 | Stop reason: HOSPADM

## 2024-09-02 RX ADMIN — BUSPIRONE HYDROCHLORIDE 10 MG: 10 TABLET ORAL at 20:56

## 2024-09-02 RX ADMIN — HEPARIN SODIUM 5000 UNITS: 5000 INJECTION INTRAVENOUS; SUBCUTANEOUS at 06:29

## 2024-09-02 RX ADMIN — SODIUM CHLORIDE, PRESERVATIVE FREE 10 ML: 5 INJECTION INTRAVENOUS at 12:10

## 2024-09-02 RX ADMIN — CLONIDINE HYDROCHLORIDE 0.2 MG: 0.2 TABLET ORAL at 20:56

## 2024-09-02 RX ADMIN — AMLODIPINE BESYLATE 10 MG: 10 TABLET ORAL at 12:09

## 2024-09-02 RX ADMIN — PREGABALIN 75 MG: 75 CAPSULE ORAL at 12:08

## 2024-09-02 RX ADMIN — HEPARIN SODIUM 3200 UNITS: 1000 INJECTION INTRAVENOUS; SUBCUTANEOUS at 11:27

## 2024-09-02 RX ADMIN — LOSARTAN POTASSIUM 100 MG: 25 TABLET, FILM COATED ORAL at 12:08

## 2024-09-02 RX ADMIN — HEPARIN SODIUM 5000 UNITS: 5000 INJECTION INTRAVENOUS; SUBCUTANEOUS at 17:12

## 2024-09-02 RX ADMIN — TORSEMIDE 20 MG: 20 TABLET ORAL at 12:09

## 2024-09-02 RX ADMIN — HYDROMORPHONE HYDROCHLORIDE 0.25 MG: 1 INJECTION, SOLUTION INTRAMUSCULAR; INTRAVENOUS; SUBCUTANEOUS at 12:09

## 2024-09-02 RX ADMIN — HEPARIN SODIUM 5000 UNITS: 5000 INJECTION INTRAVENOUS; SUBCUTANEOUS at 20:57

## 2024-09-02 RX ADMIN — SODIUM CHLORIDE, PRESERVATIVE FREE 10 ML: 5 INJECTION INTRAVENOUS at 20:57

## 2024-09-02 RX ADMIN — SODIUM CHLORIDE, PRESERVATIVE FREE 10 ML: 5 INJECTION INTRAVENOUS at 20:58

## 2024-09-02 RX ADMIN — TORSEMIDE 20 MG: 20 TABLET ORAL at 20:56

## 2024-09-02 RX ADMIN — ALPRAZOLAM 1 MG: 0.5 TABLET ORAL at 20:56

## 2024-09-02 RX ADMIN — INSULIN GLARGINE 5 UNITS: 100 INJECTION, SOLUTION SUBCUTANEOUS at 20:57

## 2024-09-02 RX ADMIN — ONDANSETRON 4 MG: 2 INJECTION INTRAMUSCULAR; INTRAVENOUS at 04:44

## 2024-09-02 RX ADMIN — BUSPIRONE HYDROCHLORIDE 10 MG: 10 TABLET ORAL at 12:09

## 2024-09-02 RX ADMIN — HYDROMORPHONE HYDROCHLORIDE 0.5 MG: 1 INJECTION, SOLUTION INTRAMUSCULAR; INTRAVENOUS; SUBCUTANEOUS at 18:13

## 2024-09-02 RX ADMIN — CLONIDINE HYDROCHLORIDE 0.2 MG: 0.2 TABLET ORAL at 12:08

## 2024-09-02 ASSESSMENT — PAIN DESCRIPTION - ONSET
ONSET: ON-GOING
ONSET: ON-GOING

## 2024-09-02 ASSESSMENT — PAIN DESCRIPTION - LOCATION
LOCATION: ABDOMEN;PELVIS
LOCATION: ABDOMEN;BACK;PELVIS

## 2024-09-02 ASSESSMENT — PAIN SCALES - GENERAL
PAINLEVEL_OUTOF10: 0
PAINLEVEL_OUTOF10: 6
PAINLEVEL_OUTOF10: 7
PAINLEVEL_OUTOF10: 6
PAINLEVEL_OUTOF10: 6

## 2024-09-02 ASSESSMENT — PAIN DESCRIPTION - DESCRIPTORS
DESCRIPTORS: ACHING;GNAWING
DESCRIPTORS: ACHING;CRAMPING;GNAWING

## 2024-09-02 ASSESSMENT — PAIN DESCRIPTION - FREQUENCY
FREQUENCY: CONTINUOUS
FREQUENCY: CONTINUOUS

## 2024-09-02 ASSESSMENT — PAIN DESCRIPTION - PAIN TYPE
TYPE: ACUTE PAIN
TYPE: ACUTE PAIN

## 2024-09-02 NOTE — PROGRESS NOTES
Internal Medicine Progress Note    Date: 9/2/2024   Patient: Kristine Ramirez   [facility] Day: 0      CC: Abdominal Pain (Pt presents the ED due to abd pain and back pain. Pt states that she was admitted here recently and had a paracentesis that drained \"18 kg\". Pt states the fluid has now come back. 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     She was recently admitted 2 days back due to massive ascites(started developing in the last November) with abdominal pain that has been going on for 3 months. Workup was done at hospital which revealed no apparent cause of ascites.  Paracentesis with 17.5 L of fluid removal was done.  Analysis of the fluid showed SAAG of 0.3, ruling out portal hypertension.  Protein was 5.  And WBC count was 268. liver was normal.  No obvious mass found on CT abdomen pelvis.  Her beta-hCG on last admission was falsely positive, likely due to ascites.  Gynecologic oncology was consulted which wanted outpatient follow-up.  Gastroenterology was also consulted and they had nothing to offer.  was slightly elevated. CEA -.  pending. Cytology pending.  IR did paracentesis and patient was feeling much better and was discharged to home with outpatient follow-up with Gyn onc.      Patient reports that after going to home and having dinner she slept.  During her sleep she had diarrhea without awareness.  Her  woke her up.  After the diarrhea she also started to have pain and felt that her abdomen is getting bigger so she came to emergency department.     Patient was well-oriented to time place person .not confused at all . on

## 2024-09-02 NOTE — PROGRESS NOTES
Treatment time: 3 hours  Net UF: 1000 ml     Pre weight: 68.5 kg  Post weight:67.5 kg      Access used: RTDC    Access function: Good with  ml/min     Medications or blood products given: heparin dwell     Regular outpatient schedule: MWF     Summary of response to treatment: Patient tolerated treatment well and without any complications. Patient remained stable throughout entire treatment.    Copy of dialysis treatment record placed in chart, to be scanned into EMR.

## 2024-09-02 NOTE — PLAN OF CARE
Problem: Discharge Planning  Goal: Discharge to home or other facility with appropriate resources  Note: She plans to return home at discharge.      Problem: Pain  Goal: Verbalizes/displays adequate comfort level or baseline comfort level  9/2/2024 1419 by Elif Holguin, RN  Note: Patient complains of abdominal, pelvic, and back pain. Medicated with dilaudid x 1. Dilaudid effective for pain relief.     Problem: Safety - Adult  Goal: Free from fall injury  9/2/2024 1419 by Elif Holguin, RN  Note: She calls appropriately for assistance out of bed. Call light in reach.      Problem: Chronic Conditions and Co-morbidities  Goal: Patient's chronic conditions and co-morbidity symptoms are monitored and maintained or improved  Note: HD done today. 1L of fluid removed.

## 2024-09-02 NOTE — CONSULTS
[facility]    [facility]              [phone]                       Plan :     Plan for dialysis today.  Follow the blood pressure trend with hemodialysis.  Given the 17 L paracentesis will keep the volume removal at a lower level.  Follow hemodynamics closely.  Follow the calcium and phosphorus     Assessment :     ESRD on HD  - Initially developed ESRD after \"being put in a coma for 8 days after a stroke which caused her kidneys to shut down\"  - MWF schedule  - Gets dialysis at [facility]  - Still makes some urine, urinates once every three days        T2DM  - per primary team     Ascites likely 2/2 malignancy        CVA  - Patient reports residual deficits from prior CVA.          [facility] would like to thank Darnell Evans MD   for opportunity to serve this patient      Please call with questions at-   24 Hrs Answering service [phone] or  7 am- 5 pm via Perfect serve or cell phone  Dr.Farhan IRA Carr MD          CC/reason for consult :     ESRD on HD     HPI :     Kristine Ramirez is a 49 y.o. 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  Patient had 17 L of paracentesis done.  Given the patient's end-stage renal disease nephrology has been consulted to assist with care      PMH/PSH/SH/Family History:     Past Medical History:   Diagnosis Date    Acute respiratory failure due to COVID-19 ([facility]) 10/16/2021    Arterial ischemic stroke, ICA, left, acute ([facility])     Blind in both eyes     Cerebral artery occlusion with cerebral infarction ([facility])     CHF (congestive heart failure) ([facility])     Chronic kidney disease     COPD (chronic obstructive pulmonary disease) ([facility])     Depression     Diabetes mellitus out of control     Diabetes mellitus, type II ([facility])     2005    Diabetic neuropathy associated with type 2 diabetes mellitus ([facility])     Generalized headaches     Hypertension      acetaminophen **OR** acetaminophen, HYDROmorphone **OR** HYDROmorphone, glucose, dextrose bolus **OR** dextrose bolus, glucagon (rDNA), dextrose, albuterol, sodium chloride flush, sodium chloride, albumin human 25%, midodrine       Vitals :     BP (!) 144/81   Pulse 98   Temp 98 °F (36.7 °C)   Resp 18   Ht 1.702 m (5' 7\")   Wt 68.5 kg (151 lb 0.2 oz)   LMP 09/01/2022   SpO2 93%   BMI 23.65 kg/m²       I & O :     No intake or output data in the 24 hours ending 09/02/24 0951     Physical Examination :     General appearance: Awake   Neck : JVD- not visible  Respiratory: Respiratory effort-  not labored  Cardiovascular: Ausculation- S1S2  Abdomen: soft       LABS:     Recent Labs     08/31/24  0333 09/01/24  1429 09/02/24  0343   WBC 9.3 9.8 7.0   HGB 10.5* 11.3* 9.3*   HCT 31.8* 34.4* 28.3*    210 181     Recent Labs     08/31/24  0333 09/01/24  1429 09/02/24  0343    133* 132*   K 4.8 4.8 4.8   CL 90* 88* 90*   CO2 29 24 23   BUN 48* 61* 67*   CREATININE 7.9* 8.4* 9.9*   GLUCOSE 93 133* 130*

## 2024-09-02 NOTE — PLAN OF CARE
Pain is controlled with PRN pain medication. Patient remains free from falls and injury.  Problem: Pain  Goal: Verbalizes/displays adequate comfort level or baseline comfort level  Outcome: Progressing     Problem: Safety - Adult  Goal: Free from fall injury  Outcome: Progressing

## 2024-09-03 ENCOUNTER — APPOINTMENT (OUTPATIENT)
Dept: CT IMAGING | Age: 49
DRG: 947 | End: 2024-09-03
Payer: COMMERCIAL

## 2024-09-03 PROBLEM — R18.8 OTHER ASCITES: Status: ACTIVE | Noted: 2024-09-03

## 2024-09-03 LAB
GLUCOSE BLD-MCNC: 118 MG/DL (ref 70–99)
GLUCOSE BLD-MCNC: 122 MG/DL (ref 70–99)
GLUCOSE BLD-MCNC: 130 MG/DL (ref 70–99)
GLUCOSE BLD-MCNC: 235 MG/DL (ref 70–99)
PERFORMED ON: ABNORMAL

## 2024-09-03 PROCEDURE — 2580000003 HC RX 258

## 2024-09-03 PROCEDURE — 71250 CT THORAX DX C-: CPT

## 2024-09-03 PROCEDURE — 6360000002 HC RX W HCPCS: Performed by: INTERNAL MEDICINE

## 2024-09-03 PROCEDURE — 0W9G3ZZ DRAINAGE OF PERITONEAL CAVITY, PERCUTANEOUS APPROACH: ICD-10-PCS | Performed by: RADIOLOGY

## 2024-09-03 PROCEDURE — 1200000000 HC SEMI PRIVATE

## 2024-09-03 PROCEDURE — 6370000000 HC RX 637 (ALT 250 FOR IP)

## 2024-09-03 PROCEDURE — G0378 HOSPITAL OBSERVATION PER HR: HCPCS

## 2024-09-03 PROCEDURE — 6360000002 HC RX W HCPCS

## 2024-09-03 PROCEDURE — 6370000000 HC RX 637 (ALT 250 FOR IP): Performed by: INTERNAL MEDICINE

## 2024-09-03 RX ORDER — HYDROMORPHONE HYDROCHLORIDE 1 MG/ML
0.25 INJECTION, SOLUTION INTRAMUSCULAR; INTRAVENOUS; SUBCUTANEOUS EVERY 6 HOURS PRN
Status: DISCONTINUED | OUTPATIENT
Start: 2024-09-03 | End: 2024-09-06 | Stop reason: HOSPADM

## 2024-09-03 RX ORDER — NALOXONE HYDROCHLORIDE 0.4 MG/ML
0.4 INJECTION, SOLUTION INTRAMUSCULAR; INTRAVENOUS; SUBCUTANEOUS PRN
Status: DISCONTINUED | OUTPATIENT
Start: 2024-09-03 | End: 2024-09-06 | Stop reason: HOSPADM

## 2024-09-03 RX ORDER — ALPRAZOLAM 0.25 MG
0.25 TABLET ORAL 2 TIMES DAILY
Status: DISCONTINUED | OUTPATIENT
Start: 2024-09-03 | End: 2024-09-04

## 2024-09-03 RX ADMIN — CLONIDINE HYDROCHLORIDE 0.2 MG: 0.2 TABLET ORAL at 21:43

## 2024-09-03 RX ADMIN — ALPRAZOLAM 1 MG: 0.5 TABLET ORAL at 08:22

## 2024-09-03 RX ADMIN — LOSARTAN POTASSIUM 100 MG: 25 TABLET, FILM COATED ORAL at 08:22

## 2024-09-03 RX ADMIN — HEPARIN SODIUM 5000 UNITS: 5000 INJECTION INTRAVENOUS; SUBCUTANEOUS at 21:43

## 2024-09-03 RX ADMIN — HYDROMORPHONE HYDROCHLORIDE 0.5 MG: 1 INJECTION, SOLUTION INTRAMUSCULAR; INTRAVENOUS; SUBCUTANEOUS at 12:02

## 2024-09-03 RX ADMIN — HYDROMORPHONE HYDROCHLORIDE 0.25 MG: 1 INJECTION, SOLUTION INTRAMUSCULAR; INTRAVENOUS; SUBCUTANEOUS at 18:27

## 2024-09-03 RX ADMIN — SODIUM CHLORIDE, PRESERVATIVE FREE 10 ML: 5 INJECTION INTRAVENOUS at 21:30

## 2024-09-03 RX ADMIN — TORSEMIDE 20 MG: 20 TABLET ORAL at 21:43

## 2024-09-03 RX ADMIN — PREGABALIN 75 MG: 75 CAPSULE ORAL at 14:35

## 2024-09-03 RX ADMIN — ALPRAZOLAM 0.25 MG: 0.25 TABLET ORAL at 21:43

## 2024-09-03 RX ADMIN — HYDROMORPHONE HYDROCHLORIDE 0.5 MG: 1 INJECTION, SOLUTION INTRAMUSCULAR; INTRAVENOUS; SUBCUTANEOUS at 05:57

## 2024-09-03 RX ADMIN — HYDROMORPHONE HYDROCHLORIDE 0.5 MG: 1 INJECTION, SOLUTION INTRAMUSCULAR; INTRAVENOUS; SUBCUTANEOUS at 00:30

## 2024-09-03 RX ADMIN — SODIUM CHLORIDE, PRESERVATIVE FREE 10 ML: 5 INJECTION INTRAVENOUS at 08:22

## 2024-09-03 RX ADMIN — CLONIDINE HYDROCHLORIDE 0.2 MG: 0.2 TABLET ORAL at 08:22

## 2024-09-03 RX ADMIN — AMLODIPINE BESYLATE 10 MG: 10 TABLET ORAL at 08:22

## 2024-09-03 RX ADMIN — INSULIN GLARGINE 5 UNITS: 100 INJECTION, SOLUTION SUBCUTANEOUS at 21:43

## 2024-09-03 RX ADMIN — BUSPIRONE HYDROCHLORIDE 10 MG: 10 TABLET ORAL at 21:43

## 2024-09-03 RX ADMIN — HEPARIN SODIUM 5000 UNITS: 5000 INJECTION INTRAVENOUS; SUBCUTANEOUS at 16:27

## 2024-09-03 RX ADMIN — HEPARIN SODIUM 5000 UNITS: 5000 INJECTION INTRAVENOUS; SUBCUTANEOUS at 05:58

## 2024-09-03 RX ADMIN — TORSEMIDE 20 MG: 20 TABLET ORAL at 08:22

## 2024-09-03 RX ADMIN — BUSPIRONE HYDROCHLORIDE 10 MG: 10 TABLET ORAL at 08:22

## 2024-09-03 RX ADMIN — SODIUM CHLORIDE, PRESERVATIVE FREE 10 ML: 5 INJECTION INTRAVENOUS at 21:44

## 2024-09-03 RX ADMIN — INSULIN LISPRO 1 UNITS: 100 INJECTION, SOLUTION INTRAVENOUS; SUBCUTANEOUS at 08:21

## 2024-09-03 ASSESSMENT — PAIN SCALES - GENERAL
PAINLEVEL_OUTOF10: 0
PAINLEVEL_OUTOF10: 7
PAINLEVEL_OUTOF10: 9

## 2024-09-03 ASSESSMENT — PAIN DESCRIPTION - DESCRIPTORS
DESCRIPTORS: ACHING;GNAWING
DESCRIPTORS: ACHING;GNAWING
DESCRIPTORS: ACHING
DESCRIPTORS: ACHING

## 2024-09-03 ASSESSMENT — PAIN DESCRIPTION - PAIN TYPE
TYPE: ACUTE PAIN

## 2024-09-03 ASSESSMENT — PAIN - FUNCTIONAL ASSESSMENT
PAIN_FUNCTIONAL_ASSESSMENT: PREVENTS OR INTERFERES SOME ACTIVE ACTIVITIES AND ADLS
PAIN_FUNCTIONAL_ASSESSMENT: ACTIVITIES ARE NOT PREVENTED

## 2024-09-03 ASSESSMENT — PAIN DESCRIPTION - ONSET
ONSET: ON-GOING
ONSET: GRADUAL
ONSET: ON-GOING
ONSET: ON-GOING

## 2024-09-03 ASSESSMENT — PAIN DESCRIPTION - FREQUENCY
FREQUENCY: CONTINUOUS
FREQUENCY: INTERMITTENT
FREQUENCY: CONTINUOUS
FREQUENCY: CONTINUOUS

## 2024-09-03 ASSESSMENT — PAIN DESCRIPTION - LOCATION
LOCATION: ABDOMEN
LOCATION: ABDOMEN
LOCATION: ABDOMEN;PELVIS
LOCATION: ABDOMEN;PELVIS

## 2024-09-03 ASSESSMENT — PAIN DESCRIPTION - ORIENTATION
ORIENTATION: MID
ORIENTATION: MID

## 2024-09-03 NOTE — PROGRESS NOTES
Internal Medicine Progress Note    Date: 9/3/2024   Patient: Kristine Ramirez   [facility] Day: 0      CC: Abdominal Pain (Pt presents the ED due to abd pain and back pain. Pt states that she was admitted here recently and had a paracentesis that drained \"18 kg\". Pt states the fluid has now come back. 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     She was recently admitted 2 days back due to massive ascites(started developing in the last November) with abdominal pain that has been going on for 3 months. Workup was done at hospital which revealed no apparent cause of ascites.  Paracentesis with 17.5 L of fluid removal was done.  Analysis of the fluid showed SAAG of 0.3, ruling out portal hypertension.  Protein was 5.  And WBC count was 268. liver was normal.  No obvious mass found on CT abdomen pelvis.  Her beta-hCG on last admission was falsely positive, likely due to ascites.  Gynecologic oncology was consulted which wanted outpatient follow-up.  Gastroenterology was also consulted and they had nothing to offer.  was slightly elevated. CEA -.  pending. Cytology pending.  IR did paracentesis and patient was feeling much better and was discharged to home with outpatient follow-up with Gyn onc.      Patient reports that after going to home and having dinner she slept.  During her sleep she had diarrhea without awareness.  Her  woke her up.  After the diarrhea she also started to have pain and felt that her abdomen is getting bigger so she came to emergency department.     Patient was well-oriented to time place person .not confused at all . on  \"UROBILINOGEN\", \"BILIRUBINUR\", \"BLOODU\", \"GLUCOSEU\", \"KETONES\", \"AMORPHOUS\" in the last 72 hours.    Radiology:  US GUIDED PARACENTESIS    (Results Pending)         Assessment & Plan     #New Onset Ascites 2/2 malignancy/portal HTN/cardiac failure/HD associated Ascites/unknown origin?  -distended abdomen w/o fever/chills/leukocytosis/AMS  -Massive ascites found on CT and USG 2 days ago   -Cirhosis unlikely given no +findings on radiology/physical exam/normal INR, bilirubin and liver functions  -No hx of alcohol and viral hepatitis  -No abdominal and pelvic mass found on CT/abdomen  -Pt has ESRDHD on MWF  -Paracentesis done 2 days ago with removal of 17.5L of fluid  -Quick re-accumulation of fluid after parancentesis  PLAN:  -IR guidede diagnostic + therapeutic paracentesis with repeat analysis on the fluid to rule out SBP  -Follow cytology report  -rocephin d/chuckie    DVT PPx:  Diet: ADULT DIET; Regular   Code status:  Full Code     ELOS:  Barriers to discharge:  Disposition  - Preadmission:  - Current:  - Upon discharge:    Will discuss with attending physician Layla Acevedo MD     _____________________  Merrick Alcala MD   Internal Medicine Resident, PGY-1

## 2024-09-03 NOTE — PROGRESS NOTES
[facility]    [facility]              [phone]                       Interval History and Plan:    Patient seen at bedside  Comfortable on room air.   BP labile and last BP 92/63  Had HD yesterday   Labs not checked today.           HD tomorrow per MWF schedule.   Check renal panel tomorrow.   Follow the blood pressure trend. BP appear to be labile and patient is on Amlodipine, Losartan and Torsemide. Will adjust BP medications if needed.        Assessment :     ESRD on HD  - MWF schedule  - Gets dialysis at [facility]  - Still makes some urine, urinates once every three days        T2DM  per primary team     Ascites likely 2/2 malignancy        CVA  - Patient reports residual deficits from prior CVA.          [facility] would like to thank Layla Agrawal MD   for opportunity to serve this patient      Please call with questions at-   24 Hrs Answering service [phone] or  7 am- 5 pm via Perfect serve or cell phone  Dr.Muhammad TEDDY Robertson MD          CC/reason for consult :     ESRD on HD     HPI :     Kristine Ramirez is a 49 y.o. 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  Patient had 17 L of paracentesis done.  Given the patient's end-stage renal disease nephrology has been consulted to assist with care      PMH/PSH/SH/Family History:     Past Medical History:   Diagnosis Date    Acute respiratory failure due to COVID-19 ([facility]) 10/16/2021    Arterial ischemic stroke, ICA, left, acute ([facility])     Blind in both eyes     Cerebral artery occlusion with cerebral infarction ([facility])     CHF (congestive heart failure) ([facility])     Chronic kidney disease     COPD (chronic obstructive pulmonary disease) ([facility])     Depression     Diabetes mellitus out of control     Diabetes mellitus, type II ([facility])     2005    Diabetic neuropathy associated with type 2 diabetes mellitus ([facility])     Generalized     PRAPARE - Transportation     Lack of Transportation (Medical): No     Lack of Transportation (Non-Medical): No   Physical Activity: Inactive (8/22/2024)    Exercise Vital Sign     Days of Exercise per Week: 0 days     Minutes of Exercise per Session: 0 min   Stress: Stress Concern Present (8/22/2024)    [employer] [place] [employer] - Occupational Stress Questionnaire     Feeling of Stress : Very much   Social Connections: Not on file   Intimate Partner Violence: Not on file   Housing Stability: Low Risk  (9/1/2024)    Housing Stability Vital Sign     Unable to Pay for Housing in the Last Year: No     Number of Times Moved in the Last Year: 1     Homeless in the Last Year: No           Problem Relation Age of Onset    Diabetes Mother     Other Mother 67        Covid    Diabetes Father     High Blood Pressure Father     Colon Cancer Father     Diabetes Sister     Alcohol Abuse Maternal Grandfather     Diabetes Paternal Grandmother     Alcohol Abuse Paternal Grandfather     Diabetes Paternal Aunt     Diabetes Paternal Uncle           Medication:      ALPRAZolam  1 mg Oral BID    [Held by provider] atorvastatin  40 mg Oral Nightly    [Held by provider] aspirin  81 mg Oral Daily    amLODIPine  10 mg Oral Daily    [Held by provider] Atogepant  1 tablet Oral Daily    losartan  100 mg Oral Daily    busPIRone  10 mg Oral BID    torsemide  20 mg Oral BID    insulin glargine  5 Units SubCUTAneous Nightly    pregabalin  75 mg Oral Daily    sodium chloride flush  5-40 mL IntraVENous 2 times per day    insulin lispro  0-4 Units SubCUTAneous TID WC    insulin lispro  0-4 Units SubCUTAneous Nightly    heparin (porcine)  5,000 Units SubCUTAneous 3 times per day    cloNIDine  0.2 mg Oral TID    sodium chloride flush  5-40 mL IntraVENous 2 times per day     heparin (porcine), cyclobenzaprine, sodium chloride flush, sodium chloride, ondansetron **OR** ondansetron, polyethylene glycol, acetaminophen **OR** acetaminophen,  HYDROmorphone **OR** HYDROmorphone, glucose, dextrose bolus **OR** dextrose bolus, glucagon (rDNA), dextrose, albuterol, sodium chloride flush, sodium chloride, midodrine       Vitals :     BP 92/63   Pulse 100   Temp 98 °F (36.7 °C) (Oral)   Resp 16   Ht 1.702 m (5' 7\")   Wt 67.5 kg (148 lb 13 oz)   LMP 09/01/2022   SpO2 97%   BMI 23.31 kg/m²       I & O :     No intake or output data in the 24 hours ending 09/03/24 1337     Physical Examination :     General appearance: NAD. AAO x 3.   Respiratory:  No RD on room air.   Cardiovascular: No edema.   Abdomen: soft, appear distended.        LABS:     Recent Labs     09/01/24  1429 09/02/24  0343   WBC 9.8 7.0   HGB 11.3* 9.3*   HCT 34.4* 28.3*    181     Recent Labs     09/01/24  1429 09/02/24  0343   * 132*   K 4.8 4.8   CL 88* 90*   CO2 24 23   BUN 61* 67*   CREATININE 8.4* 9.9*   GLUCOSE 133* 130*                 Yes

## 2024-09-03 NOTE — PLAN OF CARE
Problem: Discharge Planning  Goal: Discharge to home or other facility with appropriate resources  9/3/2024 1731 by Elif Holguin, RN  Note: She plans to return home at discharge.      Problem: Pain  Goal: Verbalizes/displays adequate comfort level or baseline comfort level  9/3/2024 1731 by Elif Holguin, RN  Note: Medicated for pain with dilaudid IV. Patient has been drowsy all day. Pain medication dose decreased by MD.      Problem: Safety - Adult  Goal: Free from fall injury  9/3/2024 1731 by Elif Holguin, RN  Note: She is a fall risk. Door open, and bed alarm on. Call light in reach.      Problem: Chronic Conditions and Co-morbidities  Goal: Patient's chronic conditions and co-morbidity symptoms are monitored and maintained or improved  9/3/2024 1731 by Elif Holguin, RN  Note: Paracentesis to be done tomorrow. Consent has been signed.

## 2024-09-03 NOTE — PLAN OF CARE
Problem: Discharge Planning  Goal: Discharge to home or other facility with appropriate resources  9/3/2024 0038 by Nilda Chávez, RN  Outcome: Progressing  Flowsheets (Taken 9/3/2024 0038)  Discharge to home or other facility with appropriate resources:   Identify barriers to discharge with patient and caregiver   Arrange for needed discharge resources and transportation as appropriate   Identify discharge learning needs (meds, wound care, etc)   Refer to discharge planning if patient needs post-hospital services based on physician order or complex needs related to functional status, cognitive ability or social support system  9/2/2024 1419 by Elif Holguin, RN  Note: She plans to return home at discharge.      Problem: Pain  Goal: Verbalizes/displays adequate comfort level or baseline comfort level  9/3/2024 0038 by Nilda Chávez RN  Outcome: Progressing  Flowsheets (Taken 9/3/2024 0038)  Verbalizes/displays adequate comfort level or baseline comfort level:   Encourage patient to monitor pain and request assistance   Assess pain using appropriate pain scale   Administer analgesics based on type and severity of pain and evaluate response   Consider cultural and social influences on pain and pain management   Notify Licensed Independent Practitioner if interventions unsuccessful or patient reports new pain  9/2/2024 1419 by Elif Holguin, RN  Note: Patient complains of abdominal, pelvic, and back pain. Medicated with dilaudid x 1. Dilaudid effective for pain relief.     Problem: Safety - Adult  Goal: Free from fall injury  9/3/2024 0038 by Nilda Chávez, RN  Outcome: Progressing  Flowsheets (Taken 9/3/2024 0038)  Free From Fall Injury:   Instruct family/caregiver on patient safety   Based on caregiver fall risk screen, instruct family/caregiver to ask for assistance with transferring infant if caregiver noted to have fall risk factors  9/2/2024 1419 by Elif Holguin, RN  Note: She calls appropriately for assistance out of  bed. Call light in reach.      Problem: Chronic Conditions and Co-morbidities  Goal: Patient's chronic conditions and co-morbidity symptoms are monitored and maintained or improved  9/3/2024 0038 by Nilda Chávez, RN  Outcome: Progressing  Flowsheets (Taken 9/3/2024 0038)  Care Plan - Patient's Chronic Conditions and Co-Morbidity Symptoms are Monitored and Maintained or Improved:   Monitor and assess patient's chronic conditions and comorbid symptoms for stability, deterioration, or improvement   Collaborate with multidisciplinary team to address chronic and comorbid conditions and prevent exacerbation or deterioration   Update acute care plan with appropriate goals if chronic or comorbid symptoms are exacerbated and prevent overall improvement and discharge  9/2/2024 1419 by Elif Holguin, RN  Note: HD done today. 1L of fluid removed.

## 2024-09-03 NOTE — CARE COORDINATION
Case Management Assessment  Initial Evaluation    Date/Time of Evaluation: 9/3/2024 8:38 AM  Assessment Completed by: Kylee Dover RN    If patient is discharged prior to next notation, then this note serves as note for discharge by case management.    Patient Name: Kristine Ramirez                   YOB: 1975  Diagnosis: Abdominal pain, unspecified abdominal location [R10.9]  Encounter for assessment of ascites [Z76.89]                   Date / Time: 9/1/2024  2:12 PM    Patient Admission Status: Observation   Readmission Risk (Low < 19, Mod (19-27), High > 27): Readmission Risk Score: 17.3    Current PCP: Damon Mireles MD  PCP verified by CM?      Chart Reviewed: Yes      History Provided by:    Patient Orientation:      Patient Cognition:      Hospitalization in the last 30 days (Readmission):  No    If yes, Readmission Assessment in CM Navigator will be completed.    Advance Directives:      Code Status: Full Code   Patient's Primary Decision Maker is:      Primary Decision Maker: Kannan Ramirez - Spouse - [phone]    Discharge Planning:    Patient lives with: Spouse/Significant Other Type of Home: House  Primary Care Giver:    Patient Support Systems include: Spouse/Significant Other   Current Financial resources:    Current community resources:    Current services prior to admission: None            Current DME:              Type of Home Care services:  None    ADLS  Prior functional level:    Current functional level:      PT AM-PAC:   /24  OT AM-PAC:   /24    Family can provide assistance at DC:    Would you like Case Management to discuss the discharge plan with any other family members/significant others, and if so, who?    Plans to Return to Present Housing:    Other Identified Issues/Barriers to RETURNING to current housing: safety  Potential Assistance needed at discharge: N/A            Potential DME:    Patient expects to discharge to: House  Plan for transportation at  discharge:      Financial    Payor: ANNA [employer]LL[employer] [place] / Plan: [employer]STILLO[employer] GUILL[employer] [place] DUAL / Product Type: *No Product type* /     Does insurance require precert for SNF: No    Potential assistance Purchasing Medications:    Meds-to-Beds request:        [facility] [id number] - [place], [place] - [address] [phone] - F [phone]  [address]  [place] [address]  Phone: [phone] Fax: [phone]    [facility] ([facility]) #[id number] [place], [place] - [address] [phone] - F [phone]  [address] [facility] [address]  Phone: [phone] Fax: [phone]      Notes:    Factors facilitating achievement of predicted outcomes: Family support, Cooperative, and Pleasant    Barriers to discharge: Pain    Additional Case Management Notes:     Patient was  recently d/c  and  return to ER  for  abd pain  ascites     CM  following for  d/c planning:    Patient states she lives at  home with spouse  , Indep  with  DME  has  RW and  W/C  and ramped  entrance to Condo  . Has  ESRD  HD  MWF:    Admitted  with  Abd  pain ,  GI Gyn and  ID Nephro following :     Patient  S/P therapeutic paracentesis:  prev admission     Cont to follow  denies any d/c needs at this time .      Spouse to transport home  via private car.        The Plan for Transition of Care is related to the following treatment goals of Abdominal pain, unspecified abdominal location [R10.9]  Encounter for assessment of ascites [Z76.89]    IF APPLICABLE: The Patient and/or patient representative Kristine and her family were provided with a choice of provider and agrees with the discharge plan. Freedom of choice list with basic dialogue that supports the patient's individualized plan of care/goals and shares the quality data associated with the providers was provided to:     Patient Representative Name:       The Patient and/or Patient Representative Agree with the Discharge Plan?      Kylee Dover RN  Case

## 2024-09-03 NOTE — PROGRESS NOTES
Walked in pts room as she was getting ready to fall. Said she was trying to clean up because she was getting bored of being in the bed. Pt educated on the importance of utilizing the call light before getting OOB, and that she would need a bed alarm. She refused. CN notified.  Plan of care ongoing.

## 2024-09-03 NOTE — PLAN OF CARE
General Internal Medicine Attending    Chart and data reviewed.  Patient seen and examined, case discussed with medical resident.   Physical exam repeated.  Labs and imaging studies reviewed.       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         New Onset Ascites; Extensive abdominal ascites: POA  -17.5 L of ascites drained on 8/30/2024; discharged, readmitted with recurrence of ascites    ESRD on HD: POA  - Hx of nephrotic syndrome     DM type 2, complicated by nephropathy, peripheral neuropathy, retinopathy: POA    Coronary Angiography w/ nonobstructive CAD   -     Hx of CVA 4 years ago    Legally blind          Massive ascites found on CT and USG few days ago with removal of 17.5L of fluid with quick re-accumulation of fluid after large volume parancentesis    Undetermined etiology for her recurrent ascites, possibly sec to ESRD/nephrtic syndrome or acute on chronic heart failure    Cirrhosis unlikely given no hx of liver disease and ascitic fluid studies, no findings of portal HTN    Borderline elevated  at 37.5 (upper range of normal 35)     Imaging results do not show any abnormal obvious GYN malignancy or masses. No abdominal and pelvic mass found on CT/abdomen    The borderline elevated  may just be reactive in nature     Will await cytology from paracentesis that was done on recent admit    Pt has ESRD on HD on MWF    Appears has a hx of nephrotic syndrome     Reviewed recent echo from 8/31/24: EF 55%; possible inferior hypokinesia, grade 1 DD; RV size and Fxn: Normal    Last chest imaging: Cardiomegaly and vasc congestion; but this is from 4/2024. ProBNP was elevated > 70K although no recent comparison values as last one was in 1/2022 when it was  13K    Her HS trop was elevated, but she has ESRD.     Reviewed last cath from 2021: Cardiac Cath LVG:  Anatomy:   LM-nml   LAD-nml  Cx-nml  OM- nml  RCA-mid 70%  RPDA- nml  LVEF- 65  LVG- nml  LVEDP- 14     Intervention  FFR RCA 0.90        Will obtain chest imaging  in view of distal esophageal finding incidentally noted on CT A/P.      Keep on telemetry    Update BNP, trop, EKG    IR guided diagnostic + therapeutic paracentesis with repeat analysis on the fluid planned    Follow cytology report from recent ascitic fluid    Diuretics if still making urine    Ultrafiltration with HD    Will need EGD to eval Edematous appearance of the distal esophagus: Was planned o/p by GI on recent admission            Disposition:     Discharged; re-admitted with re-accumulation of peritoneal fluid    Possible DC in 2-3 days    PT/OT nino Agrawal MD

## 2024-09-04 ENCOUNTER — APPOINTMENT (OUTPATIENT)
Dept: ULTRASOUND IMAGING | Age: 49
DRG: 947 | End: 2024-09-04
Payer: COMMERCIAL

## 2024-09-04 LAB
ALBUMIN SERPL-MCNC: 3 G/DL (ref 3.4–5)
ANION GAP SERPL CALCULATED.3IONS-SCNC: 15 MMOL/L (ref 3–16)
APPEARANCE FLUID: NORMAL
BACTERIA SPEC ANAEROBE CULT: NORMAL
BDY FLUID QUALITY: NORMAL
BUN SERPL-MCNC: 52 MG/DL (ref 7–20)
CALCIUM SERPL-MCNC: 7.8 MG/DL (ref 8.3–10.6)
CELL COUNT FLUID TYPE: NORMAL
CHLORIDE SERPL-SCNC: 94 MMOL/L (ref 99–110)
CO2 SERPL-SCNC: 27 MMOL/L (ref 21–32)
COLOR FLUID: NORMAL
CREAT SERPL-MCNC: 7.6 MG/DL (ref 0.6–1.1)
DEPRECATED RDW RBC AUTO: 16.1 % (ref 12.4–15.4)
EKG ATRIAL RATE: 92 BPM
EKG DIAGNOSIS: NORMAL
EKG P AXIS: 20 DEGREES
EKG P-R INTERVAL: 134 MS
EKG Q-T INTERVAL: 350 MS
EKG QRS DURATION: 80 MS
EKG QTC CALCULATION (BAZETT): 432 MS
EKG R AXIS: 2 DEGREES
EKG T AXIS: 106 DEGREES
EKG VENTRICULAR RATE: 92 BPM
GFR SERPLBLD CREATININE-BSD FMLA CKD-EPI: 6 ML/MIN/{1.73_M2}
GLUCOSE BLD-MCNC: 104 MG/DL (ref 70–99)
GLUCOSE BLD-MCNC: 196 MG/DL (ref 70–99)
GLUCOSE BLD-MCNC: 218 MG/DL (ref 70–99)
GLUCOSE BLD-MCNC: 281 MG/DL (ref 70–99)
GLUCOSE SERPL-MCNC: 64 MG/DL (ref 70–99)
HCT VFR BLD AUTO: 27.4 % (ref 36–48)
HGB BLD-MCNC: 8.9 G/DL (ref 12–16)
LYMPHOCYTES NFR FLD: 33 %
MACROPHAGES # FLD: 63 %
MCH RBC QN AUTO: 27.5 PG (ref 26–34)
MCHC RBC AUTO-ENTMCNC: 32.3 G/DL (ref 31–36)
MCV RBC AUTO: 85.2 FL (ref 80–100)
NEUTROPHIL, FLUID: 4 %
NT-PROBNP SERPL-MCNC: ABNORMAL PG/ML (ref 0–124)
NUC CELL # FLD: 550 /CUMM
PERFORMED ON: ABNORMAL
PHOSPHATE SERPL-MCNC: 6.2 MG/DL (ref 2.5–4.9)
PLATELET # BLD AUTO: 218 K/UL (ref 135–450)
PMV BLD AUTO: 7.7 FL (ref 5–10.5)
POTASSIUM SERPL-SCNC: 5.5 MMOL/L (ref 3.5–5.1)
PTH-INTACT SERPL-MCNC: 173.7 PG/ML (ref 14–72)
RBC # BLD AUTO: 3.22 M/UL (ref 4–5.2)
RBC FLUID: NORMAL /CUMM
SODIUM SERPL-SCNC: 136 MMOL/L (ref 136–145)
TOTAL CELLS COUNTED FLD: 100
TROPONIN, HIGH SENSITIVITY: 465 NG/L (ref 0–14)
WBC # BLD AUTO: 6.2 K/UL (ref 4–11)

## 2024-09-04 PROCEDURE — 93005 ELECTROCARDIOGRAM TRACING: CPT | Performed by: INTERNAL MEDICINE

## 2024-09-04 PROCEDURE — 82042 OTHER SOURCE ALBUMIN QUAN EA: CPT

## 2024-09-04 PROCEDURE — 1200000000 HC SEMI PRIVATE

## 2024-09-04 PROCEDURE — 87205 SMEAR GRAM STAIN: CPT

## 2024-09-04 PROCEDURE — 6370000000 HC RX 637 (ALT 250 FOR IP)

## 2024-09-04 PROCEDURE — 6370000000 HC RX 637 (ALT 250 FOR IP): Performed by: INTERNAL MEDICINE

## 2024-09-04 PROCEDURE — 82945 GLUCOSE OTHER FLUID: CPT

## 2024-09-04 PROCEDURE — P9047 ALBUMIN (HUMAN), 25%, 50ML: HCPCS

## 2024-09-04 PROCEDURE — 93010 ELECTROCARDIOGRAM REPORT: CPT | Performed by: INTERNAL MEDICINE

## 2024-09-04 PROCEDURE — 83970 ASSAY OF PARATHORMONE: CPT

## 2024-09-04 PROCEDURE — 82306 VITAMIN D 25 HYDROXY: CPT

## 2024-09-04 PROCEDURE — 36415 COLL VENOUS BLD VENIPUNCTURE: CPT

## 2024-09-04 PROCEDURE — 84484 ASSAY OF TROPONIN QUANT: CPT

## 2024-09-04 PROCEDURE — 87070 CULTURE OTHR SPECIMN AEROBIC: CPT

## 2024-09-04 PROCEDURE — 84157 ASSAY OF PROTEIN OTHER: CPT

## 2024-09-04 PROCEDURE — 6360000002 HC RX W HCPCS: Performed by: INTERNAL MEDICINE

## 2024-09-04 PROCEDURE — 82150 ASSAY OF AMYLASE: CPT

## 2024-09-04 PROCEDURE — 90935 HEMODIALYSIS ONE EVALUATION: CPT

## 2024-09-04 PROCEDURE — 6370000000 HC RX 637 (ALT 250 FOR IP): Performed by: STUDENT IN AN ORGANIZED HEALTH CARE EDUCATION/TRAINING PROGRAM

## 2024-09-04 PROCEDURE — 80069 RENAL FUNCTION PANEL: CPT

## 2024-09-04 PROCEDURE — 6360000002 HC RX W HCPCS: Performed by: STUDENT IN AN ORGANIZED HEALTH CARE EDUCATION/TRAINING PROGRAM

## 2024-09-04 PROCEDURE — 2580000003 HC RX 258

## 2024-09-04 PROCEDURE — 2709999900 US GUIDED PARACENTESIS

## 2024-09-04 PROCEDURE — 83615 LACTATE (LD) (LDH) ENZYME: CPT

## 2024-09-04 PROCEDURE — 83880 ASSAY OF NATRIURETIC PEPTIDE: CPT

## 2024-09-04 PROCEDURE — 85027 COMPLETE CBC AUTOMATED: CPT

## 2024-09-04 PROCEDURE — 6360000002 HC RX W HCPCS

## 2024-09-04 RX ORDER — ALPRAZOLAM 0.5 MG
0.5 TABLET ORAL ONCE
Status: COMPLETED | OUTPATIENT
Start: 2024-09-04 | End: 2024-09-04

## 2024-09-04 RX ORDER — ALBUMIN (HUMAN) 12.5 G/50ML
70 SOLUTION INTRAVENOUS ONCE
Status: COMPLETED | OUTPATIENT
Start: 2024-09-04 | End: 2024-09-04

## 2024-09-04 RX ORDER — SEVELAMER CARBONATE 800 MG/1
800 TABLET, FILM COATED ORAL
Status: DISCONTINUED | OUTPATIENT
Start: 2024-09-04 | End: 2024-09-06 | Stop reason: HOSPADM

## 2024-09-04 RX ORDER — ALBUMIN (HUMAN) 12.5 G/50ML
70 SOLUTION INTRAVENOUS ONCE
Status: DISCONTINUED | OUTPATIENT
Start: 2024-09-04 | End: 2024-09-04

## 2024-09-04 RX ORDER — ALPRAZOLAM 0.5 MG
1 TABLET ORAL 2 TIMES DAILY
Status: DISCONTINUED | OUTPATIENT
Start: 2024-09-05 | End: 2024-09-06

## 2024-09-04 RX ADMIN — HYDROMORPHONE HYDROCHLORIDE 0.25 MG: 1 INJECTION, SOLUTION INTRAMUSCULAR; INTRAVENOUS; SUBCUTANEOUS at 16:58

## 2024-09-04 RX ADMIN — EPOETIN ALFA-EPBX 8000 UNITS: 4000 INJECTION, SOLUTION INTRAVENOUS; SUBCUTANEOUS at 11:56

## 2024-09-04 RX ADMIN — HYDROMORPHONE HYDROCHLORIDE 0.25 MG: 1 INJECTION, SOLUTION INTRAMUSCULAR; INTRAVENOUS; SUBCUTANEOUS at 23:43

## 2024-09-04 RX ADMIN — SODIUM CHLORIDE: 9 INJECTION, SOLUTION INTRAVENOUS at 19:27

## 2024-09-04 RX ADMIN — SODIUM CHLORIDE, PRESERVATIVE FREE 10 ML: 5 INJECTION INTRAVENOUS at 12:59

## 2024-09-04 RX ADMIN — ALPRAZOLAM 0.25 MG: 0.25 TABLET ORAL at 08:25

## 2024-09-04 RX ADMIN — BUSPIRONE HYDROCHLORIDE 10 MG: 10 TABLET ORAL at 12:53

## 2024-09-04 RX ADMIN — HYDROMORPHONE HYDROCHLORIDE 0.25 MG: 1 INJECTION, SOLUTION INTRAMUSCULAR; INTRAVENOUS; SUBCUTANEOUS at 08:25

## 2024-09-04 RX ADMIN — SEVELAMER CARBONATE 800 MG: 800 TABLET, FILM COATED ORAL at 12:53

## 2024-09-04 RX ADMIN — SEVELAMER CARBONATE 800 MG: 800 TABLET, FILM COATED ORAL at 16:57

## 2024-09-04 RX ADMIN — LOSARTAN POTASSIUM 100 MG: 25 TABLET, FILM COATED ORAL at 12:53

## 2024-09-04 RX ADMIN — AMLODIPINE BESYLATE 10 MG: 10 TABLET ORAL at 12:53

## 2024-09-04 RX ADMIN — PREGABALIN 75 MG: 75 CAPSULE ORAL at 12:53

## 2024-09-04 RX ADMIN — ALPRAZOLAM 0.5 MG: 0.5 TABLET ORAL at 23:43

## 2024-09-04 RX ADMIN — ALPRAZOLAM 0.25 MG: 0.25 TABLET ORAL at 20:53

## 2024-09-04 RX ADMIN — INSULIN GLARGINE 5 UNITS: 100 INJECTION, SOLUTION SUBCUTANEOUS at 20:54

## 2024-09-04 RX ADMIN — ALBUMIN (HUMAN) 70 G: 0.25 INJECTION, SOLUTION INTRAVENOUS at 19:29

## 2024-09-04 RX ADMIN — BUSPIRONE HYDROCHLORIDE 10 MG: 10 TABLET ORAL at 20:53

## 2024-09-04 RX ADMIN — TORSEMIDE 20 MG: 20 TABLET ORAL at 12:57

## 2024-09-04 RX ADMIN — HEPARIN SODIUM 5000 UNITS: 5000 INJECTION INTRAVENOUS; SUBCUTANEOUS at 23:45

## 2024-09-04 RX ADMIN — HEPARIN SODIUM 5000 UNITS: 5000 INJECTION INTRAVENOUS; SUBCUTANEOUS at 05:47

## 2024-09-04 RX ADMIN — INSULIN LISPRO 1 UNITS: 100 INJECTION, SOLUTION INTRAVENOUS; SUBCUTANEOUS at 17:20

## 2024-09-04 RX ADMIN — TORSEMIDE 20 MG: 20 TABLET ORAL at 20:53

## 2024-09-04 RX ADMIN — SODIUM CHLORIDE, PRESERVATIVE FREE 10 ML: 5 INJECTION INTRAVENOUS at 12:58

## 2024-09-04 RX ADMIN — CLONIDINE HYDROCHLORIDE 0.2 MG: 0.2 TABLET ORAL at 12:53

## 2024-09-04 RX ADMIN — ONDANSETRON 4 MG: 2 INJECTION INTRAMUSCULAR; INTRAVENOUS at 23:43

## 2024-09-04 ASSESSMENT — PAIN - FUNCTIONAL ASSESSMENT
PAIN_FUNCTIONAL_ASSESSMENT: ACTIVITIES ARE NOT PREVENTED

## 2024-09-04 ASSESSMENT — PAIN DESCRIPTION - DESCRIPTORS
DESCRIPTORS: ACHING

## 2024-09-04 ASSESSMENT — PAIN SCALES - GENERAL
PAINLEVEL_OUTOF10: 5
PAINLEVEL_OUTOF10: 5
PAINLEVEL_OUTOF10: 8
PAINLEVEL_OUTOF10: 8
PAINLEVEL_OUTOF10: 3
PAINLEVEL_OUTOF10: 7

## 2024-09-04 ASSESSMENT — PAIN DESCRIPTION - LOCATION
LOCATION: ABDOMEN
LOCATION: ABDOMEN;BACK

## 2024-09-04 ASSESSMENT — PAIN DESCRIPTION - ORIENTATION
ORIENTATION: RIGHT;LEFT
ORIENTATION: LEFT;RIGHT
ORIENTATION: RIGHT;LEFT
ORIENTATION: RIGHT;LEFT

## 2024-09-04 ASSESSMENT — PAIN DESCRIPTION - PAIN TYPE
TYPE: ACUTE PAIN

## 2024-09-04 ASSESSMENT — PAIN SCALES - WONG BAKER
WONGBAKER_NUMERICALRESPONSE: HURTS A LITTLE BIT

## 2024-09-04 ASSESSMENT — PAIN DESCRIPTION - FREQUENCY: FREQUENCY: CONTINUOUS

## 2024-09-04 ASSESSMENT — PAIN DESCRIPTION - ONSET: ONSET: ON-GOING

## 2024-09-04 NOTE — PROGRESS NOTES
Internal Medicine Progress Note    Date: 2024   Patient: Kristine Ramirez   [facility] Day: 1      CC: Abdominal Pain (Pt presents the ED due to abd pain and back pain. Pt states that she was admitted here recently and had a paracentesis that drained \"18 kg\". Pt states the fluid has now come back. 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     She was recently admitted 2 days back due to massive ascites(started developing in the last November) with abdominal pain that has been going on for 3 months. Workup was done at hospital which revealed no apparent cause of ascites.  Paracentesis with 17.5 L of fluid removal was done.  Analysis of the fluid showed SAAG of 0.3, ruling out portal hypertension.  Protein was 5.  And WBC count was 268. liver was normal.  No obvious mass found on CT abdomen pelvis.  Her beta-hCG on last admission was falsely positive, likely due to ascites.  Gynecologic oncology was consulted which wanted outpatient follow-up.  Gastroenterology was also consulted and they had nothing to offer.  was slightly elevated. CEA -.  pending. Cytology pending.  IR did paracentesis and patient was feeling much better and was discharged to home with outpatient follow-up with Gyn onc.      Patient reports that after going to home and having dinner she slept.  During her sleep she had diarrhea without awareness.  Her  woke her up.  After the diarrhea she also started to have pain  Medicine Resident, PGY-1

## 2024-09-04 NOTE — CARE COORDINATION
Chart review day 1- from home with spouse, pt is legally blind; HD [facility] MWF, has RW and WC at home. Ascites, pathology pending for cytology from paracentesis. Plan for dc to home. Cm will continue to follow for DCP needs.

## 2024-09-04 NOTE — CONSULTS
IR consult complete, successful US paracentesis with removal of 10.5L of clear red fluid. Please see procedure dictation for additional information.

## 2024-09-04 NOTE — PLAN OF CARE
General Internal Medicine Attending     Chart and data reviewed.  Patient seen and examined, case discussed with medical resident.   Physical exam repeated.  Labs and imaging studies reviewed.         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            New Onset Ascites; Extensive abdominal ascites: POA  -17.5 L of ascites drained on 8/30/2024; discharged, readmitted with recurrence of ascites     ESRD on HD: POA  - Hx of nephrotic syndrome      Chronic heart failure with preserved EF: POA     Elevated troponins: POA     DM type 2, complicated by nephropathy, peripheral neuropathy, retinopathy: POA     Hx of  CAD     Distal esophageal thickening/edema on CT     Hx of CVA 4 years ago     Legally blind              Massive ascites found on CT and USG few days prior to current admission, with removal of 17.5L of fluid with quick re-accumulation of fluid after large volume parancentesis     Undetermined etiology for her recurrent ascites, possibly sec to ESRD/nephrtic syndrome or malignant ascites    Cirrhosis unlikely given no hx of liver disease and ascitic fluid studies, no findings of portal HTN     Borderline elevated  at 37.5 (upper range of normal 35); possibly  reactive in nature     Imaging results do not show any abnormal obvious GYN malignancy or masses. No abdominal and pelvic mass found on CT/abdomen, h/e, there has been this abnormality noted in the distal esophagus, noted again on CT Chest    Will await cytology from paracentesis that was done on recent admit; but I believe the mass in the distal esophagus requires further evaluation; especially as potentially an esophageal cancer may cause ascites, especially with invasion of the thoracic duct. GI consult

## 2024-09-04 NOTE — PROGRESS NOTES
[facility]    [email or web address]              [phone]                       Interval History and Plan:    Patient seen at bedside with resident.   Comfortable on room air.   BP labile and last /65  Had HD today  K 5.5  Bicarbonate 27  Phos 6.2        HD today per MWF schedule.   Start Sevelamer and follow phos level  Ca mild lower side and check PTH and Vit D.   Follow the blood pressure trend. BP appear to be labile and patient is on Amlodipine, Losartan and Torsemide. Will adjust BP medications if needed.        Assessment :     ESRD on HD  - MWF schedule  - Gets dialysis at [facility]  - Still makes some urine, urinates once every three days        T2DM  per primary team     Ascites likely 2/2 malignancy        CVA  - Patient reports residual deficits from prior CVA.          [facility] would like to thank Layla Agrawal MD   for opportunity to serve this patient      Please call with questions at-   24 Hrs Answering service [phone] or  7 am- 5 pm via Perfect [employer] or cell phone  Dr.Muhammad TEDDY Robertson MD          CC/reason for consult :     ESRD on HD     HPI :     Kristine Ramirez is a 49 y.o. 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  Patient had 17 L of paracentesis done.  Given the patient's end-stage renal disease nephrology has been consulted to assist with care      PMH/PSH/SH/Family History:     Past Medical History:   Diagnosis Date    Acute respiratory failure due to COVID-19 ([facility]) 10/16/2021    Arterial ischemic stroke, ICA, left, acute ([facility])     Blind in both eyes     Cerebral artery occlusion with cerebral infarction ([facility])     CHF (congestive heart failure) ([facility])     Chronic kidney disease     COPD (chronic obstructive pulmonary disease) ([facility])     Depression     Diabetes mellitus out of control     Diabetes mellitus, type II ([facility])     2005    Diabetic  Year: Often true   Transportation Needs: No Transportation Needs (9/1/2024)    PRAPARE - Transportation     Lack of Transportation (Medical): No     Lack of Transportation (Non-Medical): No   Physical Activity: Inactive (8/22/2024)    Exercise Vital Sign     Days of Exercise per Week: 0 days     Minutes of Exercise per Session: 0 min   Stress: Stress Concern Present (8/22/2024)    [employer] - Occupational Stress Questionnaire     Feeling of Stress : Very much   Social Connections: Not on file   Intimate Partner Violence: Not on file   Housing Stability: Low Risk  (9/1/2024)    Housing Stability Vital Sign     Unable to Pay for Housing in the Last Year: No     Number of Times Moved in the Last Year: 1     Homeless in the Last Year: No           Problem Relation Age of Onset    Diabetes Mother     Other Mother 67        Covid    Diabetes Father     High Blood Pressure Father     Colon Cancer Father     Diabetes Sister     Alcohol Abuse Maternal Grandfather     Diabetes Paternal Grandmother     Alcohol Abuse Paternal Grandfather     Diabetes Paternal Aunt     Diabetes Paternal Uncle           Medication:      epoetin yohana-epbx  8,000 Units SubCUTAneous Once per day on Monday Wednesday Friday    ALPRAZolam  0.25 mg Oral BID    [Held by provider] atorvastatin  40 mg Oral Nightly    [Held by provider] aspirin  81 mg Oral Daily    amLODIPine  10 mg Oral Daily    [Held by provider] Atogepant  1 tablet Oral Daily    losartan  100 mg Oral Daily    busPIRone  10 mg Oral BID    torsemide  20 mg Oral BID    insulin glargine  5 Units SubCUTAneous Nightly    pregabalin  75 mg Oral Daily    sodium chloride flush  5-40 mL IntraVENous 2 times per day    insulin lispro  0-4 Units SubCUTAneous TID WC    insulin lispro  0-4 Units SubCUTAneous Nightly    heparin (porcine)  5,000 Units SubCUTAneous 3 times per day    cloNIDine  0.2 mg Oral TID    sodium chloride flush  5-40 mL IntraVENous 2 times per day

## 2024-09-04 NOTE — PROGRESS NOTES
Treatment time: 3 hours 30 minutes  Net UF: 1500 ml     Pre weight: 70.6 kg  Post weight:69.1 kg    Access used: RTDC    Access function: Good with  ml/min     Medications or blood products given: Heparin dwell     Regular outpatient schedule: MWF     Summary of response to treatment: Patient tolerated treatment well and without any complications. Patient remained stable throughout entire treatment.    Copy of dialysis treatment record placed in chart, to be scanned into EMR.

## 2024-09-04 NOTE — PLAN OF CARE
Problem: Pain  Goal: Verbalizes/displays adequate comfort level or baseline comfort level  9/4/2024 1807 by Melly Uribe RN  Outcome: Progressing   Pain manage with PRN pain meds pr request as per ordered  Problem: Safety - Adult  Goal: Free from fall injury  9/4/2024 1807 by Melly Uribe, RN  Outcome: Progressing   Safety precaution in place call light in reach will christopher to monitor  Problem: Chronic Conditions and Co-morbidities  Goal: Patient's chronic conditions and co-morbidity symptoms are monitored and maintained or improved  9/4/2024 1807 by Melly Uribe, RN  Outcome: Progressing

## 2024-09-04 NOTE — CONSULTS
GI Consult Note      Admission Date: 9/1/2024  Hospital Day: Hospital Day: 4  Attending: Layla Agrawal MD  Date of service: 9/4/24    Subjective:     Chief complaint/ Reason for consult:   Abnormal esophagus     HPI: Kristine Ramirez is a 49 y.o.  female patient, who was seen at the request of Layla Acevedo MD.    History was obtained from chart review and the patient.         49-year-old female with CHF, COPD, ascites now presents with recurrent ascites and abnormal CT scan.  Patient underwent a large-volume paracentesis during recent admission with removal of 17.5 L of fluid.  Her SAAG was less than 1.1 with a total protein greater than 2.5.  GI was consulted last admission and Dr. Romero saw the patient and recommended outpatient EGD to further evaluate the esophagus.  CT scan showed debris in the distal esophagus with lower esophageal thickening.  She returns with recurrent ascites and underwent a repeat paracentesis today with removal of 7 L.  She does have dysphagia in the cervical esophagus she states for several months or perhaps as long as a couple years.  She constant heartburn and does not take any medicines for it.  Denies any melena hematochezia.  No previous upper endoscopy.  Denies any alcohol use.  CT scan also showed CHF and moderate to large ascites.         Past Endoscopic History:  None                    Past Medical History:     Past Medical History:   Diagnosis Date    Acute respiratory failure due to COVID-19 ([facility]) 10/16/2021    Arterial ischemic stroke, ICA, left, acute ([facility])     Blind in both eyes     Cerebral artery occlusion with cerebral infarction ([facility])     CHF (congestive heart failure) ([facility])     Chronic kidney disease     COPD (chronic obstructive pulmonary disease) ([facility])     Depression     Diabetes mellitus out of control     Diabetes mellitus, type II ([facility])     2005    Diabetic neuropathy associated with type 2 diabetes mellitus ([facility])     Generalized headaches      Hypertension     Infertility     Insomnia     chronic vs lack of time spent to sleep    Migraine headache 11/09/2011    Mixed hyperlipidemia     Otitis media     h/o recurrent    Pelvic abscess in female 10/05/2013    Pneumonia     2004 approx.    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 She has never used smokeless tobacco.  Alcohol use:   reports no history of alcohol use.  Currently lives in: [place] [address]   reports no history of drug use.         Family History:       Problem Relation Age of Onset    Diabetes Mother     Other Mother 67        Covid    Diabetes Father     High Blood Pressure Father     Colon Cancer Father     Diabetes Sister     Alcohol Abuse Maternal Grandfather     Diabetes Paternal Grandmother     Alcohol Abuse Paternal Grandfather     Diabetes Paternal Aunt     Diabetes Paternal Uncle            Medications:    epoetin yohana-epbx  8,000 Units SubCUTAneous Once per day on Monday Wednesday Friday    sevelamer  800 mg Oral TID WC    albumin human 25%  70 g IntraVENous Once    ALPRAZolam  0.25 mg Oral BID    [Held by provider] atorvastatin  40 mg Oral Nightly    [Held by provider] aspirin  81 mg Oral Daily    amLODIPine  10 mg Oral Daily    [Held by provider] Atogepant  1 tablet Oral Daily    losartan  100 mg Oral Daily    busPIRone  10 mg Oral BID    torsemide  20 mg

## 2024-09-04 NOTE — PLAN OF CARE
Problem: Discharge Planning  Goal: Discharge to home or other facility with appropriate resources  9/4/2024 0542 by Nilda Chávez, RN  Outcome: Progressing  Flowsheets (Taken 9/4/2024 0542)  Discharge to home or other facility with appropriate resources:   Identify barriers to discharge with patient and caregiver   Arrange for needed discharge resources and transportation as appropriate   Identify discharge learning needs (meds, wound care, etc)   Refer to discharge planning if patient needs post-hospital services based on physician order or complex needs related to functional status, cognitive ability or social support system     Problem: Pain  Goal: Verbalizes/displays adequate comfort level or baseline comfort level  9/4/2024 0542 by Nilda Chávez, RN  Outcome: Progressing  Flowsheets (Taken 9/4/2024 0542)  Verbalizes/displays adequate comfort level or baseline comfort level:   Encourage patient to monitor pain and request assistance   Assess pain using appropriate pain scale   Administer analgesics based on type and severity of pain and evaluate response   Implement non-pharmacological measures as appropriate and evaluate response   Notify Licensed Independent Practitioner if interventions unsuccessful or patient reports new pain     Problem: Safety - Adult  Goal: Free from fall injury  9/4/2024 0542 by Nilda Chávez RN  Outcome: Progressing  Flowsheets (Taken 9/4/2024 0542)  Free From Fall Injury:   Instruct family/caregiver on patient safety   Based on caregiver fall risk screen, instruct family/caregiver to ask for assistance with transferring infant if caregiver noted to have fall risk factors     Problem: Chronic Conditions and Co-morbidities  Goal: Patient's chronic conditions and co-morbidity symptoms are monitored and maintained or improved  9/4/2024 0542 by Nilda Chávez, RN  Outcome: Progressing  Flowsheets (Taken 9/4/2024 0542)  Care Plan - Patient's Chronic Conditions and Co-Morbidity Symptoms are Monitored  and Maintained or Improved:   Monitor and assess patient's chronic conditions and comorbid symptoms for stability, deterioration, or improvement   Collaborate with multidisciplinary team to address chronic and comorbid conditions and prevent exacerbation or deterioration   Update acute care plan with appropriate goals if chronic or comorbid symptoms are exacerbated and prevent overall improvement and discharge

## 2024-09-05 ENCOUNTER — ANESTHESIA (OUTPATIENT)
Dept: ENDOSCOPY | Age: 49
End: 2024-09-05
Payer: COMMERCIAL

## 2024-09-05 ENCOUNTER — ANESTHESIA EVENT (OUTPATIENT)
Dept: ENDOSCOPY | Age: 49
End: 2024-09-05
Payer: COMMERCIAL

## 2024-09-05 LAB
25(OH)D3 SERPL-MCNC: 6 NG/ML
ALBUMIN FLD-MCNC: 2.7 G/DL
ALBUMIN FLD-MCNC: 2.7 G/DL
AMYLASE FLD-CCNC: 34 U/L
AMYLASE FLD-CCNC: 34 U/L
GLUCOSE BLD-MCNC: 112 MG/DL (ref 70–99)
GLUCOSE BLD-MCNC: 112 MG/DL (ref 70–99)
GLUCOSE BLD-MCNC: 134 MG/DL (ref 70–99)
GLUCOSE BLD-MCNC: 149 MG/DL (ref 70–99)
GLUCOSE FLD-MCNC: 92 MG/DL
LDH FLD L TO P-CCNC: 210 U/L
PERFORMED ON: ABNORMAL
SPECIMEN SOURCE FLD: NORMAL

## 2024-09-05 PROCEDURE — 6370000000 HC RX 637 (ALT 250 FOR IP)

## 2024-09-05 PROCEDURE — 3609017100 HC EGD: Performed by: INTERNAL MEDICINE

## 2024-09-05 PROCEDURE — 88342 IMHCHEM/IMCYTCHM 1ST ANTB: CPT

## 2024-09-05 PROCEDURE — 0DJ08ZZ INSPECTION OF UPPER INTESTINAL TRACT, VIA NATURAL OR ARTIFICIAL OPENING ENDOSCOPIC: ICD-10-PCS | Performed by: INTERNAL MEDICINE

## 2024-09-05 PROCEDURE — 6360000002 HC RX W HCPCS: Performed by: INTERNAL MEDICINE

## 2024-09-05 PROCEDURE — 2580000003 HC RX 258: Performed by: INTERNAL MEDICINE

## 2024-09-05 PROCEDURE — 7100000010 HC PHASE II RECOVERY - FIRST 15 MIN: Performed by: INTERNAL MEDICINE

## 2024-09-05 PROCEDURE — 97530 THERAPEUTIC ACTIVITIES: CPT

## 2024-09-05 PROCEDURE — 88341 IMHCHEM/IMCYTCHM EA ADD ANTB: CPT

## 2024-09-05 PROCEDURE — 2500000003 HC RX 250 WO HCPCS

## 2024-09-05 PROCEDURE — 6370000000 HC RX 637 (ALT 250 FOR IP): Performed by: INTERNAL MEDICINE

## 2024-09-05 PROCEDURE — 97166 OT EVAL MOD COMPLEX 45 MIN: CPT

## 2024-09-05 PROCEDURE — 6360000002 HC RX W HCPCS

## 2024-09-05 PROCEDURE — 97116 GAIT TRAINING THERAPY: CPT

## 2024-09-05 PROCEDURE — 88112 CYTOPATH CELL ENHANCE TECH: CPT

## 2024-09-05 PROCEDURE — 3700000000 HC ANESTHESIA ATTENDED CARE: Performed by: INTERNAL MEDICINE

## 2024-09-05 PROCEDURE — 1200000000 HC SEMI PRIVATE

## 2024-09-05 PROCEDURE — 97535 SELF CARE MNGMENT TRAINING: CPT

## 2024-09-05 PROCEDURE — 2580000003 HC RX 258

## 2024-09-05 PROCEDURE — 7100000011 HC PHASE II RECOVERY - ADDTL 15 MIN: Performed by: INTERNAL MEDICINE

## 2024-09-05 PROCEDURE — 88305 TISSUE EXAM BY PATHOLOGIST: CPT

## 2024-09-05 PROCEDURE — 97162 PT EVAL MOD COMPLEX 30 MIN: CPT

## 2024-09-05 RX ORDER — PANTOPRAZOLE SODIUM 40 MG/1
40 TABLET, DELAYED RELEASE ORAL
Status: DISCONTINUED | OUTPATIENT
Start: 2024-09-05 | End: 2024-09-06 | Stop reason: HOSPADM

## 2024-09-05 RX ORDER — HYDROMORPHONE HYDROCHLORIDE 1 MG/ML
0.25 INJECTION, SOLUTION INTRAMUSCULAR; INTRAVENOUS; SUBCUTANEOUS ONCE
Status: COMPLETED | OUTPATIENT
Start: 2024-09-05 | End: 2024-09-05

## 2024-09-05 RX ORDER — GLYCOPYRROLATE 0.2 MG/ML
INJECTION INTRAMUSCULAR; INTRAVENOUS PRN
Status: DISCONTINUED | OUTPATIENT
Start: 2024-09-05 | End: 2024-09-05 | Stop reason: SDUPTHER

## 2024-09-05 RX ORDER — PROPOFOL 10 MG/ML
INJECTION, EMULSION INTRAVENOUS PRN
Status: DISCONTINUED | OUTPATIENT
Start: 2024-09-05 | End: 2024-09-05 | Stop reason: SDUPTHER

## 2024-09-05 RX ORDER — LIDOCAINE HYDROCHLORIDE 20 MG/ML
INJECTION, SOLUTION EPIDURAL; INFILTRATION; INTRACAUDAL; PERINEURAL PRN
Status: DISCONTINUED | OUTPATIENT
Start: 2024-09-05 | End: 2024-09-05 | Stop reason: SDUPTHER

## 2024-09-05 RX ORDER — SODIUM CHLORIDE, SODIUM LACTATE, POTASSIUM CHLORIDE, CALCIUM CHLORIDE 600; 310; 30; 20 MG/100ML; MG/100ML; MG/100ML; MG/100ML
INJECTION, SOLUTION INTRAVENOUS CONTINUOUS PRN
Status: DISCONTINUED | OUTPATIENT
Start: 2024-09-05 | End: 2024-09-05 | Stop reason: SDUPTHER

## 2024-09-05 RX ORDER — SODIUM CHLORIDE, SODIUM LACTATE, POTASSIUM CHLORIDE, CALCIUM CHLORIDE 600; 310; 30; 20 MG/100ML; MG/100ML; MG/100ML; MG/100ML
INJECTION, SOLUTION INTRAVENOUS ONCE
Status: COMPLETED | OUTPATIENT
Start: 2024-09-05 | End: 2024-09-05

## 2024-09-05 RX ORDER — ERGOCALCIFEROL 1.25 MG/1
50000 CAPSULE, LIQUID FILLED ORAL WEEKLY
Status: DISCONTINUED | OUTPATIENT
Start: 2024-09-12 | End: 2024-09-06 | Stop reason: HOSPADM

## 2024-09-05 RX ORDER — ERGOCALCIFEROL 1.25 MG/1
50000 CAPSULE, LIQUID FILLED ORAL WEEKLY
Status: DISCONTINUED | OUTPATIENT
Start: 2024-09-05 | End: 2024-09-05

## 2024-09-05 RX ADMIN — LIDOCAINE HYDROCHLORIDE 100 MG: 20 INJECTION, SOLUTION EPIDURAL; INFILTRATION; INTRACAUDAL; PERINEURAL at 13:33

## 2024-09-05 RX ADMIN — BUSPIRONE HYDROCHLORIDE 10 MG: 10 TABLET ORAL at 21:10

## 2024-09-05 RX ADMIN — SEVELAMER CARBONATE 800 MG: 800 TABLET, FILM COATED ORAL at 16:01

## 2024-09-05 RX ADMIN — CLONIDINE HYDROCHLORIDE 0.2 MG: 0.2 TABLET ORAL at 08:35

## 2024-09-05 RX ADMIN — BUSPIRONE HYDROCHLORIDE 10 MG: 10 TABLET ORAL at 08:35

## 2024-09-05 RX ADMIN — TORSEMIDE 20 MG: 20 TABLET ORAL at 21:09

## 2024-09-05 RX ADMIN — TORSEMIDE 20 MG: 20 TABLET ORAL at 08:38

## 2024-09-05 RX ADMIN — GLYCOPYRROLATE 0.2 MG: 0.2 INJECTION INTRAMUSCULAR; INTRAVENOUS at 13:35

## 2024-09-05 RX ADMIN — PROPOFOL 50 MG: 10 INJECTION, EMULSION INTRAVENOUS at 13:33

## 2024-09-05 RX ADMIN — HEPARIN SODIUM 5000 UNITS: 5000 INJECTION INTRAVENOUS; SUBCUTANEOUS at 22:08

## 2024-09-05 RX ADMIN — ALPRAZOLAM 1 MG: 0.5 TABLET ORAL at 08:35

## 2024-09-05 RX ADMIN — ALPRAZOLAM 1 MG: 0.5 TABLET ORAL at 21:09

## 2024-09-05 RX ADMIN — HEPARIN SODIUM 5000 UNITS: 5000 INJECTION INTRAVENOUS; SUBCUTANEOUS at 05:20

## 2024-09-05 RX ADMIN — PANTOPRAZOLE SODIUM 40 MG: 40 TABLET, DELAYED RELEASE ORAL at 16:01

## 2024-09-05 RX ADMIN — SODIUM CHLORIDE, POTASSIUM CHLORIDE, SODIUM LACTATE AND CALCIUM CHLORIDE: 600; 310; 30; 20 INJECTION, SOLUTION INTRAVENOUS at 13:07

## 2024-09-05 RX ADMIN — CLONIDINE HYDROCHLORIDE 0.2 MG: 0.2 TABLET ORAL at 21:10

## 2024-09-05 RX ADMIN — INSULIN GLARGINE 5 UNITS: 100 INJECTION, SOLUTION SUBCUTANEOUS at 21:09

## 2024-09-05 RX ADMIN — PROPOFOL 100 MCG/KG/MIN: 10 INJECTION, EMULSION INTRAVENOUS at 13:34

## 2024-09-05 RX ADMIN — SODIUM CHLORIDE, SODIUM LACTATE, POTASSIUM CHLORIDE, AND CALCIUM CHLORIDE: .6; .31; .03; .02 INJECTION, SOLUTION INTRAVENOUS at 13:30

## 2024-09-05 RX ADMIN — PREGABALIN 75 MG: 75 CAPSULE ORAL at 14:54

## 2024-09-05 RX ADMIN — HYDROMORPHONE HYDROCHLORIDE 0.25 MG: 1 INJECTION, SOLUTION INTRAMUSCULAR; INTRAVENOUS; SUBCUTANEOUS at 05:18

## 2024-09-05 RX ADMIN — SODIUM CHLORIDE, PRESERVATIVE FREE 10 ML: 5 INJECTION INTRAVENOUS at 21:10

## 2024-09-05 RX ADMIN — HYDROMORPHONE HYDROCHLORIDE 0.25 MG: 1 INJECTION, SOLUTION INTRAMUSCULAR; INTRAVENOUS; SUBCUTANEOUS at 11:56

## 2024-09-05 ASSESSMENT — PAIN DESCRIPTION - FREQUENCY
FREQUENCY: CONTINUOUS

## 2024-09-05 ASSESSMENT — PAIN DESCRIPTION - DESCRIPTORS
DESCRIPTORS: DISCOMFORT
DESCRIPTORS: ACHING

## 2024-09-05 ASSESSMENT — PAIN DESCRIPTION - ORIENTATION
ORIENTATION: LEFT;RIGHT
ORIENTATION: RIGHT;LEFT
ORIENTATION: LEFT;RIGHT

## 2024-09-05 ASSESSMENT — PAIN DESCRIPTION - ONSET
ONSET: ON-GOING

## 2024-09-05 ASSESSMENT — PAIN - FUNCTIONAL ASSESSMENT
PAIN_FUNCTIONAL_ASSESSMENT: ACTIVITIES ARE NOT PREVENTED
PAIN_FUNCTIONAL_ASSESSMENT: NONE - DENIES PAIN
PAIN_FUNCTIONAL_ASSESSMENT: ACTIVITIES ARE NOT PREVENTED
PAIN_FUNCTIONAL_ASSESSMENT: ACTIVITIES ARE NOT PREVENTED
PAIN_FUNCTIONAL_ASSESSMENT: 0-10

## 2024-09-05 ASSESSMENT — PAIN SCALES - GENERAL
PAINLEVEL_OUTOF10: 5
PAINLEVEL_OUTOF10: 6
PAINLEVEL_OUTOF10: 8
PAINLEVEL_OUTOF10: 7

## 2024-09-05 ASSESSMENT — PAIN DESCRIPTION - LOCATION
LOCATION: ABDOMEN;BACK

## 2024-09-05 ASSESSMENT — PAIN DESCRIPTION - PAIN TYPE
TYPE: ACUTE PAIN

## 2024-09-05 NOTE — PROGRESS NOTES
[facility] NEPHROLOGY    [employer]              [phone]                       Interval History and Plan:    Patient seen at bedside with resident.   Comfortable on room air.   BP labile and last /72  Had HD yesterday  Labs not checked        Check renal panel, CBC in AM. Minimize blood draws.   HD tomorrow per MWF schedule. Does not appear volume overloaded.   Continue Sevelamer and follow phos level  Replete Vit D.   Follow the blood pressure trend. BP appear to be labile and patient is on Amlodipine, Losartan and Torsemide. Will adjust BP medications if needed.          Assessment :     ESRD on HD  - MWF schedule  - Gets dialysis at [facility]  - Still makes some urine, urinates once every three days        T2DM  per primary team     Ascites likely 2/2 malignancy        CVA  - Patient reports residual deficits from prior CVA.          [facility] would like to thank Layla Agrawal MD   for opportunity to serve this patient      Please call with questions at-   24 Hrs Answering service [phone] or  7 am- 5 pm via Perfect serve or cell phone  Dr.Muhammad TEDDY Robertson MD          CC/reason for consult :     ESRD on HD     HPI :     Kristine Ramirez is a 49 y.o. 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  Patient had 17 L of paracentesis done.  Given the patient's end-stage renal disease nephrology has been consulted to assist with care      PMH/PSH/SH/Family History:     Past Medical History:   Diagnosis Date    Acute respiratory failure due to COVID-19 ([facility]) 10/16/2021    Arterial ischemic stroke, ICA, left, acute ([facility])     Blind in both eyes     Cerebral artery occlusion with cerebral infarction ([facility])     CHF (congestive heart failure) ([facility])     Chronic kidney disease     COPD (chronic obstructive pulmonary disease) ([facility])     Depression     Diabetes mellitus out of control      Last Year: Often true     Ran Out of Food in the Last Year: Often true   Transportation Needs: No Transportation Needs (9/1/2024)    PRAPARE - Transportation     Lack of Transportation (Medical): No     Lack of Transportation (Non-Medical): No   Physical Activity: Inactive (8/22/2024)    Exercise Vital Sign     Days of Exercise per Week: 0 days     Minutes of Exercise per Session: 0 min   Stress: Stress Concern Present (8/22/2024)    [employer] - Occupational Stress Questionnaire     Feeling of Stress : Very much   Social Connections: Not on file   Intimate Partner Violence: Not on file   Housing Stability: Low Risk  (9/1/2024)    Housing Stability Vital Sign     Unable to Pay for Housing in the Last Year: No     Number of Times Moved in the Last Year: 1     Homeless in the Last Year: No           Problem Relation Age of Onset    Diabetes Mother     Other Mother 67        Covid    Diabetes Father     High Blood Pressure Father     Colon Cancer Father     Diabetes Sister     Alcohol Abuse Maternal Grandfather     Diabetes Paternal Grandmother     Alcohol Abuse Paternal Grandfather     Diabetes Paternal Aunt     Diabetes Paternal Uncle           Medication:      vitamin D  50,000 Units Oral Weekly    epoetin yohana-epbx  8,000 Units SubCUTAneous Once per day on Monday Wednesday Friday    sevelamer  800 mg Oral TID WC    ALPRAZolam  1 mg Oral BID    [Held by provider] atorvastatin  40 mg Oral Nightly    [Held by provider] aspirin  81 mg Oral Daily    amLODIPine  10 mg Oral Daily    [Held by provider] Atogepant  1 tablet Oral Daily    losartan  100 mg Oral Daily    busPIRone  10 mg Oral BID    torsemide  20 mg Oral BID    insulin glargine  5 Units SubCUTAneous Nightly    pregabalin  75 mg Oral Daily    sodium chloride flush  5-40 mL IntraVENous 2 times per day    insulin lispro  0-4 Units SubCUTAneous TID WC    insulin lispro  0-4 Units SubCUTAneous Nightly    heparin (porcine)  5,000 Units  SubCUTAneous 3 times per day    cloNIDine  0.2 mg Oral TID    sodium chloride flush  5-40 mL IntraVENous 2 times per day     HYDROmorphone **OR** HYDROmorphone, naloxone, heparin (porcine), cyclobenzaprine, sodium chloride flush, sodium chloride, ondansetron **OR** ondansetron, polyethylene glycol, acetaminophen **OR** acetaminophen, glucose, dextrose bolus **OR** dextrose bolus, glucagon (rDNA), dextrose, albuterol, sodium chloride flush, sodium chloride, midodrine       Vitals :     /72   Pulse 89   Temp 97.8 °F (36.6 °C) (Oral)   Resp 16   Ht 1.702 m (5' 7\")   Wt 69.1 kg (152 lb 5.4 oz)   LMP 09/01/2022   SpO2 94%   BMI 23.86 kg/m²       I & O :       Intake/Output Summary (Last 24 hours) at 9/5/2024 1127  Last data filed at 9/5/2024 0834  Gross per 24 hour   Intake 340 ml   Output --   Net 340 ml        Physical Examination :     General appearance: NAD. AAO x 3.   Respiratory:  No RD on room air. Lungs clear.   Cardiovascular: No edema.   Abdomen: soft, distended.        LABS:     Recent Labs     09/04/24  0329   WBC 6.2   HGB 8.9*   HCT 27.4*        Recent Labs     09/04/24  0329      K 5.5*   CL 94*   CO2 27   BUN 52*   CREATININE 7.6*   GLUCOSE 64*   PHOS 6.2*

## 2024-09-05 NOTE — ANESTHESIA PRE PROCEDURE
Comment: Very Rare                                Ready to quit: Not Answered  Counseling given: Not Answered  Tobacco comments: Quit in August 2021- started back up      Vital Signs (Current):   Vitals:    09/05/24 0000 09/05/24 0518 09/05/24 0834 09/05/24 1110   BP: 102/61 125/72 115/65 122/72   Pulse: (!) 104 100 95 89   Resp: 18 18 18 16   Temp: 98.2 °F (36.8 °C) 98.1 °F (36.7 °C) 97.8 °F (36.6 °C)    TempSrc: Oral Oral Oral    SpO2: 95% 97% 96% 94%   Weight:       Height:                                                  BP Readings from Last 3 Encounters:   09/05/24 122/72   08/31/24 (!) 169/74   08/30/24 (!) 159/86       NPO Status:                                                                                 BMI:   Wt Readings from Last 3 Encounters:   09/04/24 69.1 kg (152 lb 5.4 oz)   08/31/24 67.1 kg (147 lb 14.4 oz)   08/29/24 80.3 kg (177 lb)     Body mass index is 23.86 kg/m².    CBC:   Lab Results   Component Value Date/Time    WBC 6.2 09/04/2024 03:29 AM    RBC 3.22 09/04/2024 03:29 AM    HGB 8.9 09/04/2024 03:29 AM    HCT 27.4 09/04/2024 03:29 AM    MCV 85.2 09/04/2024 03:29 AM    RDW 16.1 09/04/2024 03:29 AM     09/04/2024 03:29 AM       CMP:   Lab Results   Component Value Date/Time     09/04/2024 03:29 AM    K 5.5 09/04/2024 03:29 AM    K 4.8 09/02/2024 03:43 AM    CL 94 09/04/2024 03:29 AM    CO2 27 09/04/2024 03:29 AM    BUN 52 09/04/2024 03:29 AM    CREATININE 7.6 09/04/2024 03:29 AM    GFRAA 6 09/07/2022 06:01 AM    GFRAA >60 11/09/2011 02:25 PM    AGRATIO 1.1 09/01/2024 02:29 PM    LABGLOM 6 09/04/2024 03:29 AM    LABGLOM 7 04/10/2024 01:14 PM    GLUCOSE 64 09/04/2024 03:29 AM    CALCIUM 7.8 09/04/2024 03:29 AM    BILITOT 0.7 09/01/2024 02:29 PM    ALKPHOS 105 09/01/2024 02:29 PM    AST 12 09/01/2024 02:29 PM    ALT <5 09/01/2024 02:29 PM       POC Tests:   Recent Labs     09/05/24  1138   POCGLU 112*       Coags:   Lab Results   Component Value Date/Time    PROTIME 15.2

## 2024-09-05 NOTE — PROCEDURES
[facility]  Endoscopy Note    Patient: Kristine Ramirez  : 1975  Acct#:     Procedure: Esophagogastroduodenoscopy    Date:  2024     Surgeon:  DARCIE ZENG MD      Anesthesia:    IV propofol, per anesthesia       EBL: <50 mL    Indications:, Esophageal dysphagia, abnormal imaging of the GI tract with esophageal thickening and debris in the esophagus    Description of Procedure:    Informed consent was obtained from the patient after explanation of indications, benefits and possible risks and complications of the procedure.      The patient was then taken to the endoscopy suite, placed in the left lateral decubitus position and the above IV sedation was administrered.    The Olympus videoendoscope (GIF-H190) was placed in the patient's mouth and under direct visualization passed into the esophagus.  The scope was then advanced into the stomach and to the second portion of the duodenum.  A retroflexed exam of the gastric cardia and fundus was performed. The scope was then withdrawn back into the stomach, it was decompressed, and the scope was completely withdrawn.    Findings:  Normal first and second portion of duodenum.  A large amount of retained food in the entire stomach.  Much of the stomach remained obscured but a large 15 mm cratered ulcer was seen in the gastric body.  Biopsies could not be obtained in an effort to minimize risk of aspiration with the large amount of retained food.  Grade D reflux esophagitis with possible overlying candidal esophagitis.       The patient tolerated the procedure well and was taken to the post anesthesia care unit in good condition.    Biopsies: no     Impression:    Normal first and second portion of duodenum.  A large amount of retained food in the entire stomach.  Much of the stomach remained obscured but a large 15 mm cratered ulcer was seen in the gastric body.  Biopsies could not be obtained in an effort to minimize risk of aspiration with  the large amount of retained food.  Grade D reflux esophagitis with possible overlying candidal esophagitis.      Recommendations:   PPI twice daily  Repeat EGD in 4 weeks to check for healing and biopsy the ulcer margins if it is not healed.      Joss Bernal MD  [facility]

## 2024-09-05 NOTE — DISCHARGE INSTRUCTIONS
Extra Heart Failure Education/ Tools/ Resources:     https://[employer].com/publication/?k=688471   --- this is American Heart Association interactive Healthier Living with Heart Failure guidebook.  Please click hyperlink or copy / paste link into search bar. The QR Code is also available below. Use your mouse to scroll through the pages.  Lots of information about weight monitoring, diet tips, activity, meds, etc    Heart Failure Tools and Resources QR Code is below. It includes multiple resources to include symptom tracker, med tracker, further HF info, and access to a HF Support Network online Community    HF Custar Diogo  -- this is a free smart phone diogo available for iPhone and Android download.  Use your phone to track sodium / fluid intake, zone tool symptom tracking, weights, medications, etc. Click on this hyperlink  HF Custar Diogo   for QR code for easy download or the link is also found in the below HF Tools and Resources.      DASH (Dietary Approach to Stop Hypertension) diet --  https://www.nhlbi.nih.gov/education/dash-eating-plan -- this diet is a flexible eating plan that promotes heart healthy eating style.  Click on hyperlink or copy / paste link into search bar.  Lots of low sodium recipes and tips.    https://www.[employer]/recipes  -- more free recipes        Follow Up:  Please call to schedule an appointment with primary care physician. Refer to primary care physician to manage blood pressure medicines.  Please call to schedule an appointment with gastrointestinal doctor.  Please call to schedule an appointment with gynecologic oncologist.    Medications:  Stop taking Norvasc 10mg and Atogepant 10mg.  Please start taking Protonix 40mg before breakfast and dinner daily.  Please start taking Sevelamer 800mg tablet once daily.  Please take Vitamin D 50,000 capsule once every week for 6 weeks.

## 2024-09-05 NOTE — PROGRESS NOTES
Internal Medicine Progress Note    Date: 9/5/2024   Patient: Kristine Ramirez   [facility] Day: 2      CC: Abdominal Pain (Pt presents the ED due to abd pain and back pain. Pt states that she was admitted here recently and had a paracentesis that drained \"18 kg\". Pt states the fluid has now come back. 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     She was recently admitted 2 days back due to massive ascites(started developing in the last November) with abdominal pain that has been going on for 3 months. Workup was done at hospital which revealed no apparent cause of ascites.  Paracentesis with 17.5 L of fluid removal was done.  Analysis of the fluid showed SAAG of 0.3, ruling out portal hypertension.  Protein was 5.  And WBC count was 268. liver was normal.  No obvious mass found on CT abdomen pelvis.  Her beta-hCG on last admission was falsely positive, likely due to ascites.  Gynecologic oncology was consulted which wanted outpatient follow-up.  Gastroenterology was also consulted and they had nothing to offer.  was slightly elevated. CEA -.  pending. Cytology pending.  IR did paracentesis and patient was feeling much better and was discharged to home with outpatient follow-up with Gyn onc.      Patient reports that after going to home and having dinner she slept.  During her sleep she had diarrhea without awareness.  Her  woke her up.  After the diarrhea she also started to have pain and felt that her abdomen is getting bigger so she came to emergency department.     Patient was  Layla Agrawal MD     _____________________  Merrick Alcala MD   Internal Medicine Resident, PGY-1

## 2024-09-05 NOTE — PROGRESS NOTES
Physical Therapy  Facility/Department: [address]  Physical Therapy Initial Assessment/Treatment    Name: Kristine Ramirez  : 1975  MRN: [id number]  Date of Service: 2024    Discharge Recommendations:  IP Rehab   PT Equipment Recommendations  Equipment Needed: No    At baseline this patient was IND with functional mobility. The current hospitalization has resulted in the patient now being limited with all aspects of mobility, negatively impacting the patient's functional independence, ability to safely access their home environment, and ability to complete valued daily tasks and life roles. Kristine Ramirez is motivated for recovery and demonstrates the ability to tolerate inpatient rehabilitation 3 hours/day, 5 days/week for optimal return to functional independence, quality of life, and decreased caregiver burden.        Patient Diagnosis(es): The encounter diagnosis was Abdominal pain, unspecified abdominal location.  Past Medical History:  has a past medical history of Acute respiratory failure due to COVID-19 ([facility]), Arterial ischemic stroke, ICA, left, acute ([facility]), Blind in both eyes, Cerebral artery occlusion with cerebral infarction ([facility]), CHF (congestive heart failure) ([facility]), Chronic kidney disease, COPD (chronic obstructive pulmonary disease) ([facility]), Depression, Diabetes mellitus out of control, Diabetes mellitus, type II ([facility]), Diabetic neuropathy associated with type 2 diabetes mellitus ([facility]), Generalized headaches, Hypertension, Infertility, Insomnia, Migraine headache, Mixed hyperlipidemia, Otitis media, Pelvic abscess in female, Pneumonia, Stroke ([facility]), and Stroke ([facility]).  Past Surgical History:  has a past surgical history that includes Cervix surgery; eye surgery; Foot surgery (Right); Foot surgery (Bilateral); Foot surgery (Left); IR TUNNELED CVC PLACE WO SQ PORT/PUMP > 5 YEARS (2021); and Dialysis fistula creation (Right, 2/10/2022).    Assessment  Assessment: pt presents from  functional (RLE grossly 4-/5, LLE WFL)  Coordination: Generally decreased, functional  Sensation: Intact                Balance  Sitting: Impaired (min-mod A at EOB with R lateral and posterior LOB)  Standing: Impaired (mod A without AD 2/2 R lateral and posterior lean; min A with RW)  Bed mobility  Supine to Sit: Minimal assistance (min A at trunk, HOB elevated, use of rail)  Scooting: Contact guard assistance (scooting out to EOB)  Transfers  Sit to Stand: Minimal Assistance;Moderate Assistance (min A from EOB to no AD and to RW, posterior LOB initially requiring mod A to correct.)  Stand to Sit: Minimal Assistance (cues for backing up to intended surface fully, hand placement)  Stand Pivot Transfers: Minimal Assistance (with RW)  Ambulation  Surface: Level tile  Device: Rolling Walker  Assistance: Minimal assistance  Quality of Gait: unsteady, R lateral lean, dec step length, LEs fatigue quickly with flexed knee gait as distance increased  Distance: 20ft                     AM-PAC - Mobility    -PAC Basic Mobility - Inpatient   How much help is needed turning from your back to your side while in a flat bed without using bedrails?: A Little  How much help is needed moving from lying on your back to sitting on the side of a flat bed without using bedrails?: A Lot  How much help is needed moving to and from a bed to a chair?: A Little  How much help is needed standing up from a chair using your arms?: A Little  How much help is needed walking in hospital room?: A Lot  How much help is needed climbing 3-5 steps with a railing?: A Lot  AM-PAC Inpatient Mobility Raw Score : 15  AM-PAC Inpatient T-Scale Score : 39.45  Mobility Inpatient CMS 0-100% Score: 57.7  Mobility Inpatient CMS G-Code Modifier : CK            Goals  Short Term Goals  Time Frame for Short Term Goals: discharge  Short Term Goal 1: pt will perf supine<>sit SPV  Short Term Goal 2: pt will perf STS to LRAD with SBA  Short Term Goal 3: pt will amb 50ft

## 2024-09-05 NOTE — ANESTHESIA POSTPROCEDURE EVALUATION
Department of Anesthesiology  Postprocedure Note    Patient: Kristine Ramirez  MRN: [id number]  YOB: 1975  Date of evaluation: 9/5/2024    Procedure Summary       Date: 09/05/24 Room / Location: [place] [address] / [facility]    Anesthesia Start: 1330 Anesthesia Stop: 1345    Procedure: ESOPHAGOGASTRODUODENOSCOPY Diagnosis:       Dysphagia, unspecified type      (Dysphagia, unspecified type [R13.10])    Surgeons: Joss Bernal MD Responsible Provider: Darnell Velázquez MD    Anesthesia Type: MAC, general ASA Status: 4            Anesthesia Type: No value filed.    Kay Phase I: Kay Score: 10    Kay Phase II: Kay Score: 10    Anesthesia Post Evaluation    Patient location during evaluation: PACU  Patient participation: complete - patient participated  Level of consciousness: awake and alert  Airway patency: patent  Nausea & Vomiting: no nausea and no vomiting  Cardiovascular status: blood pressure returned to baseline  Respiratory status: room air  Hydration status: euvolemic  Pain management: adequate    No notable events documented.

## 2024-09-05 NOTE — CARE COORDINATION
Chart review day 2-   From home with spouse, pt is legally blind;   - HD [facility] MWF, has RW and WC at home.  - Ascites, pathology pending for cytology from paracentesis.     GI consulted:  NPO  Plan for EGD  today .    Plan for dc to home. With  [facility]  ,     After  working  with  PTOT today  , pt  may benefit  from  IPR  ( has  been to  [facility] in the  past  and  was  successful:  )    -  CM  requested  PM&R consult from  MD  .      CM will continue to follow for DCP needs.     Electronically signed by Kylee Dover RN on 9/5/2024 at 10:33 AM       Kylee Dovre RN Case Manager  [facility]  [address]  [place] [id number] [phone]  Fax [phone]

## 2024-09-05 NOTE — PROGRESS NOTES
Occupational Therapy  Facility/Department: [facility] ENDOSCOPY  Occupational Therapy Initial Assessment and Treatment Note     Name: Kristine Ramirez  : 1975  MRN: [id number]  Date of Service: 2024    Discharge Recommendations:  IP Rehab, 24 hour supervision or assist, Home with Home health OT  OT Equipment Recommendations  Equipment Needed: No  Other: Has all needed DME       Patient Diagnosis(es): The encounter diagnosis was Abdominal pain, unspecified abdominal location.  Past Medical History:  has a past medical history of Acute respiratory failure due to COVID-19 (HCC), Arterial ischemic stroke, ICA, left, acute (HCC), Blind in both eyes, Cerebral artery occlusion with cerebral infarction (HCC), CHF (congestive heart failure) (HCC), Chronic kidney disease, COPD (chronic obstructive pulmonary disease) (HCC), Depression, Diabetes mellitus out of control, Diabetes mellitus, type II (HCC), Diabetic neuropathy associated with type 2 diabetes mellitus (HCC), Generalized headaches, Hypertension, Infertility, Insomnia, Migraine headache, Mixed hyperlipidemia, Otitis media, Pelvic abscess in female, Pneumonia, Stroke (HCC), and Stroke (HCC).  Past Surgical History:  has a past surgical history that includes Cervix surgery; eye surgery; Foot surgery (Right); Foot surgery (Bilateral); Foot surgery (Left); IR TUNNELED CVC PLACE WO SQ PORT/PUMP > 5 YEARS (2021); and Dialysis fistula creation (Right, 2/10/2022).    Treatment Diagnosis: impaired ADLs/ functional transfers and decreased endurance 2/2 Ascites      Assessment  Performance deficits / Impairments: Decreased functional mobility ;Decreased ADL status;Decreased endurance;Decreased safe awareness  Assessment: Pt from home with spouse.  Pt reports spouse performs most IADLs and assists her with showers but independent with dressing and toileting.  Pt appears to be functioning below stated baseline level .  Pt demo R sided lean with support and unsupported  is needed for bathing (which includes washing, rinsing, drying)?: A Lot  How much help is needed for toileting (which includes using toilet, bedpan, or urinal)?: A Little  How much help is needed for putting on and taking off regular upper body clothing?: A Little  How much help is needed for taking care of personal grooming?: None  How much help for eating meals?: None  AM-[facility] Inpatient Daily Activity Raw Score: 19  AM-PAC Inpatient ADL T-Scale Score : 40.22  ADL Inpatient CMS 0-100% Score: 42.8  ADL Inpatient CMS G-Code Modifier : CK    Goals  Short Term Goals  Time Frame for Short Term Goals: at d/c  Short Term Goal 1: Stance x 7 mins with Supervision for ADLs/ ADLs  Short Term Goal 2: LE Dressing with Mod [place]  Short Term Goal 3: Use RUE as functional assist with > 75% of task  Short Term Goal 4: Functional transfers with Mod independence  Patient Goals   Patient goals : \" I guess- go to rehab and get stronger \"    Therapy Time   Individual Concurrent Group Co-treatment   Time In 1141         Time Out 1219             Minutes 38             Timed Code Treatment Minutes:   23 mins     Total Treatment Minutes:  38 mins       Mitzy Dos Santos, OT

## 2024-09-05 NOTE — PLAN OF CARE
General Internal Medicine Attending     Chart and data reviewed.  Patient seen and examined, case discussed with medical resident.   Physical exam repeated.  Labs and imaging studies reviewed.         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            New Onset Ascites; Extensive abdominal ascites: POA  -17.5 L of ascites drained on 8/30/2024; discharged, readmitted with recurrence of ascites     ESRD on HD: POA  Hyperparathyroidism: POA  - Hx of nephrotic syndrome      Chronic heart failure with preserved EF: POA     Elevated troponins: POA     DM type 2, complicated by nephropathy, peripheral neuropathy, retinopathy: POA     Hx of  CAD     Distal esophageal thickening/edema on CT     Hx of CVA 4 years ago    Severe vitamin D deficiency     Legally blind              Massive ascites found on CT and USG few days prior to current admission, with removal of 17.5L of fluid with quick re-accumulation of fluid after large volume parancentesis     Undetermined etiology for her recurrent ascites, possibly sec to ESRD/nephrtic syndrome or malignant ascites     Cirrhosis unlikely given no hx of liver disease and ascitic fluid studies, no findings of portal HTN     Borderline elevated  at 37.5 (upper range of normal 35); possibly  reactive in nature     Imaging results do not show any abnormal obvious GYN malignancy or masses. No abdominal and pelvic mass found on CT/abdomen, h/e, there has been this abnormality noted in the distal esophagus, noted again on CT Chest     Will await cytology from paracentesis that was done on recent admit; but I believe the mass in the distal esophagus requires further evaluation; she has esphageal dysphagia and with this unexplained ascites;  potentially an esophageal cancer may cause ascites rarely as initial presentation, with invasion of the thoracic duct. GI consult for this purpose.  We will keep n.p.o. after midnight in anticipation     We have ordered repeat IR guided diagnostic + therapeutic paracentesis with repeat analysis on the fluid planned.  10.5L fluid removed.     Will follow result of this as well including cytology.  Will add triglyceride level (discussed with lab).     Pt has ESRD on HD on MWF; Appears has a hx of nephrotic syndrome  : Ultrafiltration with hemodialysis.  Diuretics.         Also considered: acute on chronic heart failure, but less likely. Her troponin is elevated, likely sec to ESRD. MI considered ruled out at this time as no suggestive s/s and no EKG changes.      Reviewed last cath from 2021: Cardiac Cath LVG:  Anatomy:   LM-nml   LAD-nml  Cx-nml  OM- nml  RCA-mid 70%  RPDA- nml  LVEF- 65  LVG- nml  LVEDP- 14     Intervention  FFR RCA 0.90     Plan then was: optimize medical management.      Reviewed recent echo from 8/31/24: EF 55%; possible inferior hypokinesia LV, grade 1 DD; RV size and Fxn: Normal     Keep on telemetry     Decreased narcotics, as patient was drowsy 9/3/24.  She is at high risk of complications related to narcotics and benzos especially with her end-stage renal disease.-  Decreased doses of benzos and dilaudid and increased freq. Monitor and adjust further as needed.        We will start on ergocalciferol for Vit D level of 6    Defer mgt of hyperPTHism to Nephrology.          Disposition:      Discharged; re-admitted with re-accumulation of peritoneal fluid     Possible DC in 1-2 days     PT/OT nino Agrawal MD

## 2024-09-06 VITALS
DIASTOLIC BLOOD PRESSURE: 64 MMHG | OXYGEN SATURATION: 97 % | HEART RATE: 93 BPM | HEIGHT: 67 IN | BODY MASS INDEX: 21.76 KG/M2 | SYSTOLIC BLOOD PRESSURE: 121 MMHG | TEMPERATURE: 97.8 F | WEIGHT: 138.67 LBS | RESPIRATION RATE: 18 BRPM

## 2024-09-06 LAB
ALBUMIN SERPL-MCNC: 3.1 G/DL (ref 3.4–5)
ANION GAP SERPL CALCULATED.3IONS-SCNC: 12 MMOL/L (ref 3–16)
BASOPHILS # BLD: 0 K/UL (ref 0–0.2)
BASOPHILS NFR BLD: 0.3 %
BUN SERPL-MCNC: 46 MG/DL (ref 7–20)
CALCIUM SERPL-MCNC: 8.1 MG/DL (ref 8.3–10.6)
CANCER AG19-9 SERPL IA-ACNC: <2 U/ML (ref 0–35)
CHLORIDE SERPL-SCNC: 94 MMOL/L (ref 99–110)
CO2 SERPL-SCNC: 24 MMOL/L (ref 21–32)
CREAT SERPL-MCNC: 6.1 MG/DL (ref 0.6–1.1)
DEPRECATED RDW RBC AUTO: 16 % (ref 12.4–15.4)
EOSINOPHIL # BLD: 0.1 K/UL (ref 0–0.6)
EOSINOPHIL NFR BLD: 1.4 %
GFR SERPLBLD CREATININE-BSD FMLA CKD-EPI: 8 ML/MIN/{1.73_M2}
GLUCOSE BLD-MCNC: 165 MG/DL (ref 70–99)
GLUCOSE BLD-MCNC: 90 MG/DL (ref 70–99)
GLUCOSE SERPL-MCNC: 188 MG/DL (ref 70–99)
HCT VFR BLD AUTO: 27.4 % (ref 36–48)
HGB BLD-MCNC: 8.1 G/DL (ref 12–16)
LYMPHOCYTES # BLD: 0.6 K/UL (ref 1–5.1)
LYMPHOCYTES NFR BLD: 10.5 %
MCH RBC QN AUTO: 26.3 PG (ref 26–34)
MCHC RBC AUTO-ENTMCNC: 29.7 G/DL (ref 31–36)
MCV RBC AUTO: 88.6 FL (ref 80–100)
MONOCYTES # BLD: 0.3 K/UL (ref 0–1.3)
MONOCYTES NFR BLD: 5.5 %
NEUTROPHILS # BLD: 5 K/UL (ref 1.7–7.7)
NEUTROPHILS NFR BLD: 82.3 %
PERFORMED ON: ABNORMAL
PERFORMED ON: NORMAL
PHOSPHATE SERPL-MCNC: 5 MG/DL (ref 2.5–4.9)
PLATELET # BLD AUTO: 242 K/UL (ref 135–450)
PMV BLD AUTO: 7.4 FL (ref 5–10.5)
POTASSIUM SERPL-SCNC: 5.6 MMOL/L (ref 3.5–5.1)
RBC # BLD AUTO: 3.1 M/UL (ref 4–5.2)
SODIUM SERPL-SCNC: 130 MMOL/L (ref 136–145)
WBC # BLD AUTO: 6 K/UL (ref 4–11)

## 2024-09-06 PROCEDURE — 80069 RENAL FUNCTION PANEL: CPT

## 2024-09-06 PROCEDURE — 6370000000 HC RX 637 (ALT 250 FOR IP): Performed by: INTERNAL MEDICINE

## 2024-09-06 PROCEDURE — 90935 HEMODIALYSIS ONE EVALUATION: CPT

## 2024-09-06 PROCEDURE — 85025 COMPLETE CBC W/AUTO DIFF WBC: CPT

## 2024-09-06 PROCEDURE — 6360000002 HC RX W HCPCS: Performed by: INTERNAL MEDICINE

## 2024-09-06 PROCEDURE — 36415 COLL VENOUS BLD VENIPUNCTURE: CPT

## 2024-09-06 RX ORDER — ERGOCALCIFEROL 1.25 MG/1
50000 CAPSULE ORAL WEEKLY
Qty: 5 CAPSULE | Refills: 0 | Status: SHIPPED | OUTPATIENT
Start: 2024-09-12 | End: 2024-10-18

## 2024-09-06 RX ORDER — ASPIRIN 81 MG/1
81 TABLET ORAL DAILY
Qty: 30 TABLET | Refills: 11 | Status: SHIPPED | OUTPATIENT
Start: 2024-09-06

## 2024-09-06 RX ORDER — ALPRAZOLAM 0.25 MG
0.25 TABLET ORAL 2 TIMES DAILY
Status: DISCONTINUED | OUTPATIENT
Start: 2024-09-06 | End: 2024-09-06 | Stop reason: HOSPADM

## 2024-09-06 RX ORDER — PANTOPRAZOLE SODIUM 40 MG/1
40 TABLET, DELAYED RELEASE ORAL
Qty: 30 TABLET | Refills: 3 | Status: SHIPPED | OUTPATIENT
Start: 2024-09-06

## 2024-09-06 RX ORDER — LOSARTAN POTASSIUM 25 MG/1
25 TABLET ORAL DAILY
Status: DISCONTINUED | OUTPATIENT
Start: 2024-09-07 | End: 2024-09-06 | Stop reason: HOSPADM

## 2024-09-06 RX ORDER — MIDODRINE HYDROCHLORIDE 5 MG/1
5 TABLET ORAL
Qty: 30 TABLET | Refills: 0 | Status: CANCELLED | OUTPATIENT
Start: 2024-09-06 | End: 2024-09-21

## 2024-09-06 RX ORDER — ATORVASTATIN CALCIUM 40 MG/1
40 TABLET, FILM COATED ORAL NIGHTLY
Qty: 30 TABLET | Refills: 11 | Status: SHIPPED | OUTPATIENT
Start: 2024-09-06

## 2024-09-06 RX ORDER — SEVELAMER CARBONATE 800 MG/1
800 TABLET, FILM COATED ORAL
Qty: 90 TABLET | Refills: 3 | Status: SHIPPED | OUTPATIENT
Start: 2024-09-06

## 2024-09-06 RX ADMIN — EPOETIN ALFA-EPBX 8000 UNITS: 4000 INJECTION, SOLUTION INTRAVENOUS; SUBCUTANEOUS at 11:41

## 2024-09-06 RX ADMIN — CLONIDINE HYDROCHLORIDE 0.2 MG: 0.2 TABLET ORAL at 14:34

## 2024-09-06 RX ADMIN — PANTOPRAZOLE SODIUM 40 MG: 40 TABLET, DELAYED RELEASE ORAL at 05:48

## 2024-09-06 RX ADMIN — TORSEMIDE 20 MG: 20 TABLET ORAL at 14:34

## 2024-09-06 RX ADMIN — HEPARIN SODIUM 5000 UNITS: 5000 INJECTION INTRAVENOUS; SUBCUTANEOUS at 05:49

## 2024-09-06 RX ADMIN — PREGABALIN 75 MG: 75 CAPSULE ORAL at 14:34

## 2024-09-06 RX ADMIN — PANTOPRAZOLE SODIUM 40 MG: 40 TABLET, DELAYED RELEASE ORAL at 14:34

## 2024-09-06 RX ADMIN — SEVELAMER CARBONATE 800 MG: 800 TABLET, FILM COATED ORAL at 14:34

## 2024-09-06 NOTE — PLAN OF CARE
General Internal Medicine Attending     Chart and data reviewed.  Patient seen and examined, case discussed with medical resident.   Physical exam repeated.  Labs and imaging studies reviewed.         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            New Onset Ascites; Extensive abdominal ascites: POA  -17.5 L of ascites drained on 8/30/2024; discharged, readmitted with recurrence of ascites     ESRD on HD: POA  Hyperparathyroidism: POA  - Hx of nephrotic syndrome      Chronic heart failure with preserved EF: POA     Elevated troponins: POA     DM type 2, complicated by nephropathy, peripheral neuropathy, retinopathy: POA     Hx of  CAD     Distal esophageal thickening/edema on CT     Hx of CVA 4 years ago     Severe vitamin D deficiency     Legally blind              Massive ascites found on CT and USG few days prior to current admission, with removal of 17.5L of fluid with quick re-accumulation of fluid after large volume parancentesis     Undetermined etiology for her recurrent ascites, possibly sec to ESRD/nephrtic syndrome or malignant ascites     Cirrhosis unlikely given no hx of liver disease and ascitic fluid studies, no findings of portal HTN     Borderline elevated  at 37.5 (upper range of normal 35); possibly  reactive in nature     Imaging results do not show any abnormal obvious GYN malignancy or masses. No abdominal and pelvic mass found on CT/abdomen, h/e, there has been this abnormality noted in the distal esophagus, noted again on CT Chest     Will await cytology from paracentesis that was done on recent admit; but I believe the mass in the distal esophagus requires further evaluation; she has esphageal dysphagia and with this unexplained ascites;  potentially an esophageal cancer may cause ascites rarely as initial presentation, with invasion of the thoracic duct. GI consult for this purpose.  We will keep n.p.o. after midnight in anticipation     We have ordered repeat IR guided diagnostic + therapeutic paracentesis with repeat analysis on the fluid planned.  10.5L fluid removed.      Will follow result of this as well including cytology,  triglyceride level (discussed with lab).     Pt has ESRD on HD on MWF; Appears has a hx of nephrotic syndrome  : Ultrafiltration with hemodialysis.  Diuretics.     Follow up final labs incl CA 19-9 level    Follow up with GI for biopsy of cratered stomach ulcer  noted on EGD 9/5/2024        Also considered: acute on chronic heart failure, but less likely. Her troponin is elevated, likely sec to ESRD. MI considered ruled out at this time as no suggestive s/s and no EKG changes.      Reviewed last cath from 2021: Cardiac Cath LVG:  Anatomy:   LM-nml   LAD-nml  Cx-nml  OM- nml  RCA-mid 70%  RPDA- nml  LVEF- 65  LVG- nml  LVEDP- 14     Intervention  FFR RCA 0.90     Plan then was: optimize medical management.      Reviewed recent echo from 8/31/24: EF 55%; possible inferior hypokinesia LV, grade 1 DD; RV size and Fxn: Normal     Keep on telemetry     Decreased narcotics, as patient was drowsy 9/3/24.  She is at high risk of complications related to narcotics and benzos especially with her end-stage renal disease.-  Decreased doses of benzos and dilaudid and increased freq. Monitor and adjust further as needed.     Limit psychoactive meds    Adjust BP meds to avoid hypotension     We will start on ergocalciferol for Vit D level of 6     Defer mgt of hyperPTHism to Nephrology.            Disposition:      Discharged; re-admitted with re-accumulation of peritoneal fluid     Possible DC     PT/OT eval: IP rehab           Layla Agrawal MD

## 2024-09-06 NOTE — PROGRESS NOTES
Physical Therapy  Kristine Ramirez    New PT orders received.  Chart reviewed.  Patient previously evaluation, no change in status, no new PT evaluation warranted.  PT attempted to see patient for follow up treatment although patient declining to participate - recently returned from HD, now planning to d/c home with family and follow up with outpatient PT.  Patient denies questions or concerns related to PT or mobility.  Plan to continue to follow per plan of care.    Johanna Morales PT, DPT [id number]

## 2024-09-06 NOTE — PROGRESS NOTES
Occupational Therapy  No Treatment    Approached for OT treatment. Per RN, pt is discharging home this afternoon and is currently getting dressed for discharge. No OT tx rendered.    Dora Juárez, OT [id number]

## 2024-09-06 NOTE — PROGRESS NOTES
[facility] - Acute Rehab Unit   After review, this patient is felt to be:       [x]  Dr. Gastelum states this patient is appropriate for rehab.     []  Not appropriate for Acute Inpatient Rehab    []  Referral received and ARU reviewing patient      Defer to [facility] ARU.   Will notify CM/SW with further updates. Thank you for the referral.    Lisa FAIRBANKS, OTR/L  Clinical Liaison- [facility]   (P): [phone]  (F): [phone]

## 2024-09-06 NOTE — PROGRESS NOTES
[facility]  Gastroenterology Progress Note    Kristine Ramirez is a 49 y.o. female patient.  Principal Problem:    Encounter for assessment of ascites  Active Problems:    Other ascites  Resolved Problems:    * No resolved hospital problems. *      SUBJECTIVE:    No further bleeding.  I went over the results of the EGD.  Patient is being discharged today    Physical    VITALS:  /64   Pulse 93   Temp 97.8 °F (36.6 °C)   Resp 18   Ht 1.702 m (5' 7\")   Wt 62.9 kg (138 lb 10.7 oz)   LMP 2022   SpO2 97%   BMI 21.72 kg/m²   TEMPERATURE:  Current - Temp: 97.8 °F (36.6 °C); Max - Temp  Av.4 °F (36.3 °C)  Min: 97 °F (36.1 °C)  Max: 97.8 °F (36.6 °C)    NAD, age appropriate  Integ: no rash, jaundice, ecchymoses      Data    Data Review:    Recent Labs     24  0329 24  0656   WBC 6.2 6.0   HGB 8.9* 8.1*   HCT 27.4* 27.4*   MCV 85.2 88.6    242     Recent Labs     24  0329 24  0656    130*   K 5.5* 5.6*   CL 94* 94*   CO2 27 24   PHOS 6.2* 5.0*   BUN 52* 46*   CREATININE 7.6* 6.1*     No results for input(s): \"AST\", \"ALT\", \"BILIDIR\", \"BILITOT\", \"ALKPHOS\" in the last 72 hours.    Invalid input(s): \"ALB\"  No results for input(s): \"LIPASE\", \"AMYLASE\" in the last 72 hours.  No results for input(s): \"PROTIME\", \"INR\" in the last 72 hours.  Invalid input(s): \"PTT\"        ASSESSMENT   Esophageal dysphagia  24 EGD  Impression:    Normal first and second portion of duodenum.  A large amount of retained food in the entire stomach.  Much of the stomach remained obscured but a large 15 mm cratered ulcer was seen in the gastric body.  Biopsies could not be obtained in an effort to minimize risk of aspiration with the large amount of retained food.  Grade D reflux esophagitis with possible overlying candidal esophagitis.       Recommendations:   PPI twice daily  Repeat EGD in 4 weeks to check for healing and biopsy the ulcer margins if it is not healed.      Joss Bernal,

## 2024-09-06 NOTE — DISCHARGE INSTR - COC
Continuity of Care Form    Patient Name: Kristine Ramirez   :  1975  MRN:  [id number]    Admit date:  2024  Discharge date:  ***    Code Status Order: Full Code   Advance Directives:   Advance Care Flowsheet Documentation        Date/Time Healthcare Directive Type of Healthcare Directive Copy in Chart Healthcare Agent Appointed Healthcare Agent's Name Healthcare Agent's Phone Number    24 1158 Unknown, patient unable to respond due to medical condition  --  --  --  --  --                     Admitting Physician:  Layla Agrawal MD  PCP: Damon Mireles MD    Discharging Nurse: ***  Discharging Hospital Unit/Room#: 6320/6320-01  Discharging Unit Phone Number: ***    Emergency Contact:   Extended Emergency Contact Information  Primary Emergency Contact: Kannan Ramirez  Home Phone: [phone]  Relation: Spouse    Past Surgical History:  Past Surgical History:   Procedure Laterality Date    CERVIX SURGERY      laser tx for dysplasia;    DIALYSIS FISTULA CREATION Right 2/10/2022    RIGHT BRACHIO CEPHALIC FISTULA CREATION performed by Christiano Lomeli II, MD at [facility] OR    EYE SURGERY      FOOT SURGERY Right     FOOT SURGERY Bilateral     FOOT SURGERY Left     IR TUNNELED CATHETER PLACEMENT GREATER THAN 5 YEARS  2021    IR TUNNELED CATHETER PLACEMENT GREATER THAN 5 YEARS 2021 Omari Carson MD [facility] SPECIAL PROCEDURES    UPPER GASTROINTESTINAL ENDOSCOPY N/A 2024    ESOPHAGOGASTRODUODENOSCOPY performed by Joss Bernal MD at [facility] ENDOSCOPY       Immunization History:   Immunization History   Administered Date(s) Administered    Hep B, HEPLISAV-B, (age 18y+), IM, 0.5mL 2021, 10/15/2021, 2021, 2022, 02/15/2023, 03/15/2023, 04/10/2023, 2023    Influenza 2011    Influenza Virus Vaccine 2011, 10/05/2013, 10/12/2022    Pneumococcal, PPSV23, PNEUMOVAX 23, (age 2y+), SC/IM, 0.5mL 2021    TDaP, ADACEL (age 10y-64y), BOOSTRIX (age 10y+), IM, 0.5mL  07/16/2018, 11/30/2021       Active Problems:  Patient Active Problem List   Diagnosis Code    Type 2 diabetes mellitus with hyperlipidemia ([facility]) E11.69, E78.5    Mixed hyperlipidemia E78.2    Migraine headache G43.909    Anemia D64.9    Diabetic foot infection ([facility]) E11.628, L08.9    Pyogenic inflammation of bone ([facility]) M86.9    History of medication noncompliance Z91.148    Osteomyelitis of left foot ([facility]) M86.9    Nephrotic syndrome N04.9    Peripheral edema R60.0    Pulmonary edema J81.1    Right sided numbness R20.0    Tobacco dependence F17.200    Chronic kidney disease (CKD), stage III (moderate) ([facility]) N18.30    H/O: CVA (cerebrovascular accident) Z86.73    HTN (hypertension), benign I10    DM (diabetes mellitus), secondary, uncontrolled, w/neurologic complic [id number]    Dyslipidemia E78.5    Smoker F17.200    Panic disorder F41.0    Isolated proteinuria R80.0    Diabetic peripheral neuropathy ([facility]) E11.42    Depression F32.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, associated with type 2 diabetes mellitus ([facility]) E11.3523    Cellulitis of right foot L03.115    Hidradenitis suppurativa L73.2    Hypocalcemia E83.51    Non-toxic multinodular goiter E04.2    Polyneuropathy due to type 2 diabetes mellitus ([facility]) E11.42    Proliferative diabetic retinopathy associated with type 2 diabetes mellitus ([facility]) E11.3599    End-stage renal disease on hemodialysis ([facility]) N18.6, Z99.2    Epiglottitis J05.10    Recurrent falls R29.6    Hypotension I95.9    Leukocytosis D72.829    Volume overload E87.70    Hemodialysis catheter dysfunction ([facility]) T82.41XA    Hypoproteinemia ([facility]) E77.8    GABRIELA (obstructive sleep apnea) G47.33    Type 2 diabetes mellitus with obesity ([facility]) E11.69, E66.9    Wheelchair dependence Z99.3    ESRD (end stage renal disease) ([facility]) N18.6    Hyperkalemia E87.5    Suspected COVID-19 virus infection Z20.822    Dependent on walker for

## 2024-09-06 NOTE — DISCHARGE SUMMARY
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     She was recently admitted 2 days back due to massive ascites(started developing in the last November) with abdominal pain that has been going on for 3 months. Workup was done at hospital which revealed no apparent cause of ascites.  Paracentesis with 17.5 L of fluid removal was done.  Analysis of the fluid showed SAAG of 0.3, ruling out portal hypertension.  Protein was 5.  And WBC count was 268. liver was normal.  No obvious mass found on CT abdomen pelvis.  Her beta-hCG on last admission was falsely positive, likely due to ascites.  Gynecologic oncology was consulted which wanted outpatient follow-up.  Gastroenterology was also consulted and they had nothing to offer.  was slightly elevated. CEA -.  pending. Cytology pending.  IR did paracentesis and patient was feeling much better and was discharged to home with outpatient follow-up with Gyn onc.      Patient reports that after going to home and having dinner she slept.  During her sleep she had diarrhea without awareness.  Her  woke her up.  After the diarrhea she also started to have pain and felt that her abdomen is getting bigger so she came to emergency department.     Patient was well-oriented to time place person .not confused at all . on  leg: No edema.      Left lower leg: No edema.   Skin:     Capillary Refill: Capillary refill takes less than 2 seconds.      Coloration: Skin is jaundiced.   Neurological:      General: No focal deficit present.      Mental Status: She is alert and oriented to person, place, and time. Mental status is at baseline.      Cranial Nerves: No cranial nerve deficit.      Sensory: No sensory deficit.     Consults: gastroeneterology, nephrology, Interventional radiology  Significant Diagnostic Studies:    Treatments: Therapeutic and diagnostic paracentesis  Disposition: home and rehab  Discharged Condition: Stable  Follow Up: Primary Care Physician in one week    DISCHARGE MEDICATION:       Medication List        CONTINUE taking these medications      Handicap Placard Misc  by Does not apply route Expires on 5/18/2028     [employer] Pen [place] 31G 31G X 8 MM Misc  Generic drug: Insulin Pen Needle            ASK your doctor about these medications      albuterol sulfate  (90 Base) MCG/ACT inhaler  Commonly known as: PROVENTIL;VENTOLIN;PROAIR  Inhale 2 puffs into the lungs every 6 hours as needed for Wheezing or Shortness of Breath     ALPRAZolam 2 MG extended release tablet  Commonly known as: ALPRAZolam XR  Take 1 tablet by mouth every morning for 90 days. Max Daily Amount: 2 mg     amLODIPine 10 MG tablet  Commonly known as: NORVASC     Anoro Ellipta 62.5-25 MCG/ACT inhaler  Generic drug: umeclidinium-vilanterol  INHALE 1 DOSE BY MOUTH DAILY     busPIRone 10 MG tablet  Commonly known as: BUSPAR  TAKE 1 TABLET BY MOUTH TWICE A DAY     cloNIDine 0.2 MG tablet  Commonly known as: CATAPRES  TAKE 1 TABLET BY MOUTH EVERY MORNING , THEN TAKE 1 TABLET BY MOUTH AT NOON THEN TAKE 1 TABLET BY MOUTH EVERY NIGHT AT BEDTIME     cyclobenzaprine 5 MG tablet  Commonly known as: FLEXERIL  Take 1 tablet by mouth daily as needed for Muscle spasms     desvenlafaxine succinate 25 MG Tb24 extended release tablet  Commonly known as:

## 2024-09-06 NOTE — CARE COORDINATION
Update:  CM  met with pt  and  RN , ( and  CHarge RN April ) at  bedside  once returned  from  HD , and  discussed  rec  and  Discharge  Disposition planning:    She  has  elected  to return home  and  do OP  PT  at  [facility].  [facility]  .      Patient  has  24 /7  supervision  assist  lives in handicap accessible  condo  and  has  needed  DME  to return home  and  feels  safe  doing so .      Spouse to  d/c home  later today via private car.    CM sent  PS message to  Attending  for  OP  PT  orders  .      Electronically signed by Kylee Dover RN on 9/6/2024 at 1:26 PM     ++++++++++++++++++++++++++++++++++++++++++++++    RIKI  spoke with  Lisa in [facility] liaison to follow up on referral  , she states  Dr Gastelum  will see the pt  ,  PM&R consult  placed  this AM , and  CM called  Office  # at  [facility] to  follow up     [facility] Unit  Rehabilitation center in [facility]  Address: [address] [place] [address]  Phone: [phone]    Patient  will need  [place]  pre cert       Electronically signed by Kylee Dover RN on 9/6/2024 at 11:41 AM       +++++++++++++++++++++++++++++++++++++++++++++++++      CM  following for  d/c planning      Patient currently  off unit  for  HD  :    - D/C order noted:      PTOT  attempted  to work with  pt this  AM  ,   CM  will follow up with  pt  if  she  prefers  Home  bound  D/C  vs  IPR  [facility]  was  her  preference  yesterday  pending cont progress.  -  Pt is a high fall risk and appears below her baseline.  -  Pt would benefit from IP PT at DC to reduce fall risk prior to DC home    - Pt with previous successful ARU stay. If home would rec physical A x1 with use of RW and HHPT.     CM  will follow up once returns to the  unit  .      Electronically signed by Kylee Dover RN on 9/6/2024 at 10:53 AM       Kylee Dover RN Case

## 2024-09-06 NOTE — PROGRESS NOTES
Physical Therapy/Occupational Therapy  Kristine Ramirez    Chart reviewed.  PT/OT attempted to see patient for follow up treatment although patient currently off unit for HD.  Plan to continue to follow per plan of care.    Johanna Morales PT, DPT [id number]  Dora Juárez, OT [id number]      PT attempted again at 12:15 - still off unit for HD.  Plan to continue to follow.    Johanna Morales PT, DPT [id number]

## 2024-09-06 NOTE — PROGRESS NOTES
Treatment time: 3.5 hours  Net UF: 1000 ml     Pre weight: 63.9 kg  Post weight:62.9 kg  EDW: TBD      Access used: RCVC    Access function: great     Medications or blood products given: Retacrit 8,000 units     Regular outpatient schedule: MWF     Summary of response to treatment: Patient tolerated treatment well and without any complications. Patient remained stable throughout entire treatment and upon the exiting the dialysis suite via transport.     Report given to Liana Edwards RN

## 2024-09-06 NOTE — PROGRESS NOTES
[facility]    [email or web address]              [phone]                       Interval History and Plan:      Has catheter in the right upper chest  No significant pedal edema  Seen at the time of dialysis  Blood pressure is great  Will remove only 1 L of fluid today  Potassium is 5.6 we are doing dialysis with 2K  Will keep on potassium restricted diet         Assessment :     ESRD on HD  - MWF schedule  - Gets dialysis at [facility]  - Still makes some urine, urinates once every three days        T2DM  per primary team     Ascites likely 2/2 malignancy        CVA  - Patient reports residual deficits from prior CVA.          [facility] would like to thank Layla Agrawal MD   for opportunity to serve this patient      Please call with questions at-   24 Hrs Answering service [phone] or  7 am- 5 pm via Perfect serve or cell phone  Dr.Sudhir Noy MD          CC/reason for consult :     ESRD on HD     HPI :     Kristine Ramirez is a 49 y.o. 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  Patient had 17 L of paracentesis done.  Given the patient's end-stage renal disease nephrology has been consulted to assist with care      PMH/PSH/SH/Family History:     Past Medical History:   Diagnosis Date    Acute respiratory failure due to COVID-19 ([facility]) 10/16/2021    Arterial ischemic stroke, ICA, left, acute ([facility])     Blind in both eyes     Cerebral artery occlusion with cerebral infarction ([facility])     CHF (congestive heart failure) ([facility])     Chronic kidney disease     COPD (chronic obstructive pulmonary disease) ([facility])     Depression     Diabetes mellitus out of control     Diabetes mellitus, type II ([facility])     2005    Diabetic neuropathy associated with type 2 diabetes mellitus ([facility])     Generalized headaches     Hypertension     Infertility     Insomnia     chronic vs lack of time spent to  sleep    Migraine headache 11/09/2011    Mixed hyperlipidemia     Otitis media     h/o recurrent    Pelvic abscess in female 10/05/2013    Pneumonia     2004 approx.    Stroke (HCC) 08/27/2020    Stroke (HCC) 08/27/2021       Past Surgical History:   Procedure Laterality Date    CERVIX SURGERY      laser tx for dysplasia;1992    DIALYSIS FISTULA CREATION Right 2/10/2022    RIGHT BRACHIO CEPHALIC FISTULA CREATION performed by Christiano Lomeli II, MD at [facility] OR    EYE SURGERY      FOOT SURGERY Right     FOOT SURGERY Bilateral     FOOT SURGERY Left     IR TUNNELED CATHETER PLACEMENT GREATER THAN 5 YEARS  9/7/2021    IR TUNNELED CATHETER PLACEMENT GREATER THAN 5 YEARS 9/7/2021 Omari Carson MD [facility] SPECIAL PROCEDURES    UPPER GASTROINTESTINAL ENDOSCOPY N/A 9/5/2024    ESOPHAGOGASTRODUODENOSCOPY performed by Joss Bernal MD at [facility] ENDOSCOPY       Social History     Socioeconomic History    Marital status:      Spouse name: Kannan    Number of children: 0    Years of education: Not on file    Highest education level: Not on file   Occupational History    Occupation: works as    Tobacco Use    Smoking status: Every Day     Current packs/day: 1.00     Types: Cigarettes    Smokeless tobacco: Never    Tobacco comments:     Quit in August 2021- started back up   Vaping Use    Vaping status: Never Used   Substance and Sexual Activity    Alcohol use: No     Comment: Very Rare    Drug use: No    Sexual activity: Yes     Partners: Male   Other Topics Concern    Not on file   Social History Narrative    Not on file     Social Determinants of Health     Financial Resource Strain: High Risk (8/22/2024)    Overall Financial Resource Strain (CARDIA)     Difficulty of Paying Living Expenses: Hard   Food Insecurity: Food Insecurity Present (9/1/2024)    Hunger Vital Sign     Worried About Running Out of Food in the Last Year: Often true     Ran Out of Food in the Last Year: Often true   Transportation Needs:

## 2024-09-06 NOTE — CONSULTS
Kristine Ramirez  9/6/2024  [id number]    Chief Complaint: Encounter for assessment of ascites    Subjective:   This is a 49-year-old female with past medical history including:  Past Medical History:   Diagnosis Date    Acute respiratory failure due to COVID-19 ([facility]) 10/16/2021    Arterial ischemic stroke, ICA, left, acute ([facility])     Blind in both eyes     Cerebral artery occlusion with cerebral infarction ([facility])     CHF (congestive heart failure) ([facility])     Chronic kidney disease     COPD (chronic obstructive pulmonary disease) ([facility])     Depression     Diabetes mellitus out of control     Diabetes mellitus, type II ([facility])     2005    Diabetic neuropathy associated with type 2 diabetes mellitus ([facility])     Generalized headaches     Hypertension     Infertility     Insomnia     chronic vs lack of time spent to sleep    Migraine headache 11/09/2011    Mixed hyperlipidemia     Otitis media     h/o recurrent    Pelvic abscess in female 10/05/2013    Pneumonia     2004 approx.    Stroke ([facility]) 08/27/2020    Stroke ([facility]) 08/27/2021   Who is currently being treated for new onset ascites, ESRD on HD, elevated troponins, DM2.  She is limited in therapy and likely will need intensive rehab before planned discharge home.  Limited bed availability at the [facility].      ROS: No CP, SOB, dyspnea    Objective:  Patient Vitals for the past 24 hrs:   BP Temp Temp src Pulse Resp SpO2 Weight   09/06/24 0902 107/66 97.6 °F (36.4 °C) -- 95 16 -- 63.9 kg (140 lb 14 oz)   09/06/24 0755 117/61 97 °F (36.1 °C) Axillary 91 17 100 % --   09/06/24 0408 125/80 97.6 °F (36.4 °C) Oral 90 17 95 % --   09/06/24 0001 124/77 97.1 °F (36.2 °C) Oral 81 16 98 % --   09/05/24 2109 108/61 -- -- -- -- -- --   09/05/24 1948 109/66 97.6 °F (36.4 °C) Oral 92 16 95 % --   09/05/24 1445 100/61 (!) 96.7 °F (35.9 °C) Oral 89 -- 91 % --   09/05/24 1416 (!) 104/56 -- -- 93 15 95 % --   09/05/24 1412 (!) 101/57 -- -- 95 15 95 % --   09/05/24 1405 (!) 91/55 -- -- 93  15 95 % --   09/05/24 1359 (!) 87/54 -- -- 92 15 94 % --   09/05/24 1354 (!) 84/52 -- -- 91 15 94 % --   09/05/24 1347 (!) 88/51 97.2 °F (36.2 °C) Infrared 87 15 99 % --   09/05/24 1309 (!) 108/59 97.3 °F (36.3 °C) Infrared 88 18 98 % --     Gen: No distress  HEENT: Normocephalic, atraumatic.   CV: No audible murmurs, well perfused extremities  Resp: No respiratory distress. No increased WOB  Abd: Soft, nontender nondistended  Ext: + edema.   Neuro: Alert    Laboratory data: Available via EMR.     Therapy progress:    PT    Rolling: Level of difficulty - A Little   Sit to Stand from a Chair: Level of difficulty - A Little  Supine to Sit: Level of difficulty - A Lot     Bed to Chair: Physical Assistance Required - A Little  Ambulate Across Room: Physical Assistance Required - A Little  Ascend 3-5 Steps With HR: Physical Assistance Required - A Little    OT    Eating: Physical Assistance Required - None  Grooming: Physical Assistance Required - None  LB Dressing: Physical Assistance Required - A Little  UB Dressing: Physical Assistance Required - A Little  Bathing: Physical Assistance Required - A Lot  Toileting: Physical Assistance Required - A Little    SLP         Body mass index is 22.06 kg/m².    Assessment:  Patient Active Problem List   Diagnosis    Type 2 diabetes mellitus with hyperlipidemia (HCC)    Mixed hyperlipidemia    Migraine headache    Anemia    Diabetic foot infection (HCC)    Pyogenic inflammation of bone (HCC)    History of medication noncompliance    Osteomyelitis of left foot (HCC)    Nephrotic syndrome    Peripheral edema    Pulmonary edema    Right sided numbness    Tobacco dependence    Chronic kidney disease (CKD), stage III (moderate) ([facility])    H/O: CVA (cerebrovascular accident)    HTN (hypertension), benign    DM (diabetes mellitus), secondary, uncontrolled, w/neurologic complic    Dyslipidemia    Smoker    Panic disorder    Isolated proteinuria    Diabetic peripheral neuropathy (HCC)

## 2024-09-06 NOTE — PROGRESS NOTES
D/C order noted. SL and tele removed. Pt and  verbalized understanding of new medications and follow up instructions. Transported off unit per wheelchair with  to transport home.

## 2024-09-06 NOTE — PROGRESS NOTES
Internal Medicine Progress Note    Date: 9/6/2024   Patient: Kristine Ramirez   [facility] Day: 3      CC: Abdominal Pain (Pt presents the ED due to abd pain and back pain. Pt states that she was admitted here recently and had a paracentesis that drained \"18 kg\". Pt states the fluid has now come back. 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     She was recently admitted 2 days back due to massive ascites(started developing in the last November) with abdominal pain that has been going on for 3 months. Workup was done at hospital which revealed no apparent cause of ascites.  Paracentesis with 17.5 L of fluid removal was done.  Analysis of the fluid showed SAAG of 0.3, ruling out portal hypertension.  Protein was 5.  And WBC count was 268. liver was normal.  No obvious mass found on CT abdomen pelvis.  Her beta-hCG on last admission was falsely positive, likely due to ascites.  Gynecologic oncology was consulted which wanted outpatient follow-up.  Gastroenterology was also consulted and they had nothing to offer.  was slightly elevated. CEA -.  pending. Cytology pending.  IR did paracentesis and patient was feeling much better and was discharged to home with outpatient follow-up with Gyn onc.      Patient reports that after going to home and having dinner she slept.  During her sleep she had diarrhea without awareness.  Her  woke her up.  After the diarrhea she also started to have pain and felt that her abdomen is getting bigger so she came to emergency department.     Patient was well-oriented to time place person .not confused at all . on

## 2024-09-06 NOTE — CARE COORDINATION
Case Management Assessment            Discharge Note                    Date / Time of Note: 9/6/2024 1:27 PM                  Discharge Note Completed by: Kylee Dover RN    Patient Name: Kristine Ramirez   YOB: 1975  Diagnosis: Abdominal pain, unspecified abdominal location [R10.9]  Encounter for assessment of ascites [Z76.89]  Other ascites [R18.8]   Date / Time: 9/1/2024  2:12 PM    Current PCP: Damon Mireles MD  Clinic patient: Yes    Hospitalization in the last 30 days: Yes  Readmission Assessment  Number of Days since last admission?: 1-7 days  Previous Disposition: Home with Family  Who is being Interviewed: Patient  What was the patient's/caregiver's perception as to why they think they needed to return back to the hospital?: Other (Comment) (abd distention)  Did you visit your Primary Care Physician after you left the hospital, before you returned this time?: No  Why weren't you able to visit your PCP?: Did not have an appointment  Did you see a specialist, such as Cardiac, Pulmonary, Orthopedic Physician, etc. after you left the hospital?: No  Who advised the patient to return to the hospital?: Self-referral  Does the patient report anything that got in the way of taking their medications?: No  In our efforts to provide the best possible care to you and others like you, can you think of anything that we could have done to help you after you left the hospital the first time, so that you might not have needed to return so soon?: Teach back instructions regarding management of illness, Discharge instructions that are concise, clear, and non contradictory, Teaching during hospitalization regarding your illness    Advance Directives:  Code Status: Full Code  [place] DNR form completed and on chart: {RESPONSES; YES/NO/NOT INDICATED:81314}    Financial:  [place] [employer] [place] / Plan: [employer] [place] / Product Type: *No Product type* /      Pharmacy:    [facility]  treatment goals of Abdominal pain, unspecified abdominal location [R10.9]  Encounter for assessment of ascites [Z76.89]  Other ascites [R18.8]    The Patient and/or patient representative Kristine and her family were provided with a choice of provider and agrees with the discharge plan {RESPONSES; Yes/No/Not Indicated:61446}    Freedom of choice list was provided with basic dialogue that supports the patient's individualized plan of care/goals and shares the quality data associated with the providers. {RESPONSES; Yes/No/Not Indicated:17393}    Care Transitions patient: {Yes/No:78639}    Kylee Dover RN  [facility]  Case Management Department  Ph: ***  Fax: ***

## 2024-09-06 NOTE — PLAN OF CARE
Problem: Pain  Goal: Verbalizes/displays adequate comfort level or baseline comfort level  Outcome: Progressing     Problem: Safety - Adult  Goal: Free from fall injury  Outcome: Progressing  Flowsheets (Taken 9/6/2024 0000)  Free From Fall Injury: Based on caregiver fall risk screen, instruct family/caregiver to ask for assistance with transferring infant if caregiver noted to have fall risk factors     Problem: Chronic Conditions and Co-morbidities  Goal: Patient's chronic conditions and co-morbidity symptoms are monitored and maintained or improved  Outcome: Progressing     Problem: Skin/Tissue Integrity  Goal: Absence of new skin breakdown  Description: 1.  Monitor for areas of redness and/or skin breakdown  2.  Assess vascular access sites hourly  3.  Every 4-6 hours minimum:  Change oxygen saturation probe site  4.  Every 4-6 hours:  If on nasal continuous positive airway pressure, respiratory therapy assess nares and determine need for appliance change or resting period.  Outcome: Progressing     Problem: Discharge Planning  Goal: Discharge to home or other facility with appropriate resources  Outcome: Progressing

## 2024-09-07 LAB
BACTERIA FLD AEROBE CULT: NORMAL
GRAM STN SPEC: NORMAL
PROT FLD-MCNC: 4.5 G/DL
SPECIMEN SOURCE FLD: NORMAL

## 2024-09-07 NOTE — PROGRESS NOTES
Awake, up to bathroom with walker and standby assist.  Right sided weakness/numbness unchanged. C/O level 6/10 pain to feet (neuropathy), gave Tylenol per prn order.   B/P 185/78, gave Labetalol per prn order.yessy see attending note

## 2024-09-09 ENCOUNTER — APPOINTMENT (OUTPATIENT)
Dept: CT IMAGING | Age: 49
End: 2024-09-09
Payer: COMMERCIAL

## 2024-09-09 ENCOUNTER — APPOINTMENT (OUTPATIENT)
Dept: GENERAL RADIOLOGY | Age: 49
End: 2024-09-09
Payer: COMMERCIAL

## 2024-09-09 ENCOUNTER — HOSPITAL ENCOUNTER (OUTPATIENT)
Age: 49
Setting detail: OBSERVATION
Discharge: HOME | End: 2024-09-11
Attending: INTERNAL MEDICINE | Admitting: INTERNAL MEDICINE
Payer: COMMERCIAL

## 2024-09-09 DIAGNOSIS — M54.50 ACUTE BILATERAL LOW BACK PAIN, UNSPECIFIED WHETHER SCIATICA PRESENT: ICD-10-CM

## 2024-09-09 DIAGNOSIS — E87.1 HYPONATREMIA: ICD-10-CM
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
## 2024-09-09 DIAGNOSIS — R53.1 GENERAL WEAKNESS: Primary | ICD-10-CM

## 2024-09-09 DIAGNOSIS — R53.81 PHYSICAL DECONDITIONING: ICD-10-CM

## 2024-09-09 DIAGNOSIS — E87.5 HYPERKALEMIA: ICD-10-CM

## 2024-09-09 DIAGNOSIS — S09.90XA CLOSED HEAD INJURY, INITIAL ENCOUNTER: ICD-10-CM

## 2024-09-09 DIAGNOSIS — N18.6 END STAGE RENAL DISEASE ON DIALYSIS (HCC): ICD-10-CM

## 2024-09-09 DIAGNOSIS — Z99.2 END STAGE RENAL DISEASE ON DIALYSIS (HCC): ICD-10-CM

## 2024-09-09 LAB
ALBUMIN SERPL-MCNC: 3.2 G/DL (ref 3.4–5)
ALBUMIN/GLOB SERPL: 0.9 {RATIO} (ref 1.1–2.2)
ALP SERPL-CCNC: 163 U/L (ref 40–129)
ALT SERPL-CCNC: 9 U/L (ref 10–40)
ANION GAP SERPL CALCULATED.3IONS-SCNC: 14 MMOL/L (ref 3–16)
AST SERPL-CCNC: 15 U/L (ref 15–37)
BASOPHILS # BLD: 0 K/UL (ref 0–0.2)
BASOPHILS NFR BLD: 0.2 %
BILIRUB SERPL-MCNC: 0.4 MG/DL (ref 0–1)
BUN SERPL-MCNC: 51 MG/DL (ref 7–20)
CALCIUM SERPL-MCNC: 8.4 MG/DL (ref 8.3–10.6)
CHLORIDE SERPL-SCNC: 91 MMOL/L (ref 99–110)
CO2 SERPL-SCNC: 24 MMOL/L (ref 21–32)
CREAT SERPL-MCNC: 6 MG/DL (ref 0.6–1.1)
DEPRECATED RDW RBC AUTO: 16.5 % (ref 12.4–15.4)
EOSINOPHIL # BLD: 0 K/UL (ref 0–0.6)
EOSINOPHIL NFR BLD: 0.6 %
GFR SERPLBLD CREATININE-BSD FMLA CKD-EPI: 8 ML/MIN/{1.73_M2}
GLUCOSE BLD-MCNC: 96 MG/DL (ref 70–99)
GLUCOSE SERPL-MCNC: 111 MG/DL (ref 70–99)
HCT VFR BLD AUTO: 27.4 % (ref 36–48)
HGB BLD-MCNC: 9.1 G/DL (ref 12–16)
LYMPHOCYTES # BLD: 0.8 K/UL (ref 1–5.1)
LYMPHOCYTES NFR BLD: 14.4 %
MCH RBC QN AUTO: 28.2 PG (ref 26–34)
MCHC RBC AUTO-ENTMCNC: 33.2 G/DL (ref 31–36)
MCV RBC AUTO: 85 FL (ref 80–100)
MONOCYTES # BLD: 0.4 K/UL (ref 0–1.3)
MONOCYTES NFR BLD: 7.4 %
NEUTROPHILS # BLD: 4.5 K/UL (ref 1.7–7.7)
NEUTROPHILS NFR BLD: 77.4 %
PERFORMED ON: NORMAL
PLATELET # BLD AUTO: 297 K/UL (ref 135–450)
PMV BLD AUTO: 6.9 FL (ref 5–10.5)
POTASSIUM SERPL-SCNC: 5.5 MMOL/L (ref 3.5–5.1)
PROT SERPL-MCNC: 6.6 G/DL (ref 6.4–8.2)
RBC # BLD AUTO: 3.23 M/UL (ref 4–5.2)
SODIUM SERPL-SCNC: 129 MMOL/L (ref 136–145)
WBC # BLD AUTO: 5.8 K/UL (ref 4–11)

## 2024-09-09 PROCEDURE — 70450 CT HEAD/BRAIN W/O DYE: CPT

## 2024-09-09 PROCEDURE — 6360000002 HC RX W HCPCS: Performed by: INTERNAL MEDICINE

## 2024-09-09 PROCEDURE — 85025 COMPLETE CBC W/AUTO DIFF WBC: CPT

## 2024-09-09 PROCEDURE — 90935 HEMODIALYSIS ONE EVALUATION: CPT

## 2024-09-09 PROCEDURE — 6370000000 HC RX 637 (ALT 250 FOR IP): Performed by: INTERNAL MEDICINE

## 2024-09-09 PROCEDURE — 96372 THER/PROPH/DIAG INJ SC/IM: CPT

## 2024-09-09 PROCEDURE — G0378 HOSPITAL OBSERVATION PER HR: HCPCS

## 2024-09-09 PROCEDURE — 2580000003 HC RX 258: Performed by: INTERNAL MEDICINE

## 2024-09-09 PROCEDURE — 72110 X-RAY EXAM L-2 SPINE 4/>VWS: CPT

## 2024-09-09 PROCEDURE — 71045 X-RAY EXAM CHEST 1 VIEW: CPT

## 2024-09-09 PROCEDURE — 80053 COMPREHEN METABOLIC PANEL: CPT

## 2024-09-09 PROCEDURE — 99285 EMERGENCY DEPT VISIT HI MDM: CPT

## 2024-09-09 PROCEDURE — 93005 ELECTROCARDIOGRAM TRACING: CPT | Performed by: PHYSICIAN ASSISTANT

## 2024-09-09 RX ORDER — SEVELAMER CARBONATE 800 MG/1
800 TABLET, FILM COATED ORAL
Status: DISCONTINUED | OUTPATIENT
Start: 2024-09-10 | End: 2024-09-11 | Stop reason: HOSPADM

## 2024-09-09 RX ORDER — ATORVASTATIN CALCIUM 40 MG/1
40 TABLET, FILM COATED ORAL NIGHTLY
Status: DISCONTINUED | OUTPATIENT
Start: 2024-09-09 | End: 2024-09-11 | Stop reason: HOSPADM

## 2024-09-09 RX ORDER — ALBUTEROL SULFATE 90 UG/1
2 AEROSOL, METERED RESPIRATORY (INHALATION) EVERY 6 HOURS PRN
Status: DISCONTINUED | OUTPATIENT
Start: 2024-09-09 | End: 2024-09-11 | Stop reason: HOSPADM

## 2024-09-09 RX ORDER — ASPIRIN 81 MG/1
81 TABLET ORAL DAILY
Status: DISCONTINUED | OUTPATIENT
Start: 2024-09-10 | End: 2024-09-11 | Stop reason: HOSPADM

## 2024-09-09 RX ORDER — DEXTROSE MONOHYDRATE 100 MG/ML
INJECTION, SOLUTION INTRAVENOUS CONTINUOUS PRN
Status: DISCONTINUED | OUTPATIENT
Start: 2024-09-09 | End: 2024-09-11 | Stop reason: HOSPADM

## 2024-09-09 RX ORDER — INSULIN LISPRO 100 [IU]/ML
0-4 INJECTION, SOLUTION INTRAVENOUS; SUBCUTANEOUS NIGHTLY
Status: DISCONTINUED | OUTPATIENT
Start: 2024-09-09 | End: 2024-09-11 | Stop reason: HOSPADM

## 2024-09-09 RX ORDER — BUSPIRONE HYDROCHLORIDE 5 MG/1
10 TABLET ORAL 2 TIMES DAILY
Status: DISCONTINUED | OUTPATIENT
Start: 2024-09-09 | End: 2024-09-11 | Stop reason: HOSPADM

## 2024-09-09 RX ORDER — CYCLOBENZAPRINE HCL 10 MG
5 TABLET ORAL DAILY PRN
Status: DISCONTINUED | OUTPATIENT
Start: 2024-09-09 | End: 2024-09-11 | Stop reason: HOSPADM

## 2024-09-09 RX ORDER — SODIUM CHLORIDE 0.9 % (FLUSH) 0.9 %
5-40 SYRINGE (ML) INJECTION PRN
Status: DISCONTINUED | OUTPATIENT
Start: 2024-09-09 | End: 2024-09-11 | Stop reason: HOSPADM

## 2024-09-09 RX ORDER — ACETAMINOPHEN 650 MG/1
650 SUPPOSITORY RECTAL EVERY 6 HOURS PRN
Status: DISCONTINUED | OUTPATIENT
Start: 2024-09-09 | End: 2024-09-11 | Stop reason: HOSPADM

## 2024-09-09 RX ORDER — METOPROLOL SUCCINATE 50 MG/1
50 TABLET, EXTENDED RELEASE ORAL DAILY
Status: DISCONTINUED | OUTPATIENT
Start: 2024-09-10 | End: 2024-09-09

## 2024-09-09 RX ORDER — INSULIN LISPRO 100 [IU]/ML
0-8 INJECTION, SOLUTION INTRAVENOUS; SUBCUTANEOUS
Status: DISCONTINUED | OUTPATIENT
Start: 2024-09-10 | End: 2024-09-11 | Stop reason: HOSPADM

## 2024-09-09 RX ORDER — ACETAMINOPHEN 325 MG/1
650 TABLET ORAL EVERY 6 HOURS PRN
Status: DISCONTINUED | OUTPATIENT
Start: 2024-09-09 | End: 2024-09-11 | Stop reason: HOSPADM

## 2024-09-09 RX ORDER — GLUCAGON 1 MG/ML
1 KIT INJECTION PRN
Status: DISCONTINUED | OUTPATIENT
Start: 2024-09-09 | End: 2024-09-11 | Stop reason: HOSPADM

## 2024-09-09 RX ORDER — PANTOPRAZOLE SODIUM 40 MG/1
40 TABLET, DELAYED RELEASE ORAL
Status: DISCONTINUED | OUTPATIENT
Start: 2024-09-10 | End: 2024-09-11 | Stop reason: HOSPADM

## 2024-09-09 RX ORDER — TORSEMIDE 20 MG/1
20 TABLET ORAL 2 TIMES DAILY
Status: DISCONTINUED | OUTPATIENT
Start: 2024-09-09 | End: 2024-09-11 | Stop reason: HOSPADM

## 2024-09-09 RX ORDER — CLONIDINE HYDROCHLORIDE 0.1 MG/1
0.2 TABLET ORAL 3 TIMES DAILY
Status: DISCONTINUED | OUTPATIENT
Start: 2024-09-09 | End: 2024-09-11 | Stop reason: HOSPADM

## 2024-09-09 RX ORDER — SODIUM CHLORIDE 0.9 % (FLUSH) 0.9 %
5-40 SYRINGE (ML) INJECTION EVERY 12 HOURS SCHEDULED
Status: DISCONTINUED | OUTPATIENT
Start: 2024-09-09 | End: 2024-09-11 | Stop reason: HOSPADM

## 2024-09-09 RX ORDER — SODIUM CHLORIDE 9 MG/ML
INJECTION, SOLUTION INTRAVENOUS PRN
Status: DISCONTINUED | OUTPATIENT
Start: 2024-09-09 | End: 2024-09-11 | Stop reason: HOSPADM

## 2024-09-09 RX ORDER — POLYETHYLENE GLYCOL 3350 17 G/17G
17 POWDER, FOR SOLUTION ORAL DAILY PRN
Status: DISCONTINUED | OUTPATIENT
Start: 2024-09-09 | End: 2024-09-11 | Stop reason: HOSPADM

## 2024-09-09 RX ORDER — PREGABALIN 75 MG/1
75 CAPSULE ORAL DAILY
Status: DISCONTINUED | OUTPATIENT
Start: 2024-09-10 | End: 2024-09-11 | Stop reason: HOSPADM

## 2024-09-09 RX ORDER — INSULIN GLARGINE 100 [IU]/ML
5 INJECTION, SOLUTION SUBCUTANEOUS NIGHTLY
Status: DISCONTINUED | OUTPATIENT
Start: 2024-09-09 | End: 2024-09-11 | Stop reason: HOSPADM

## 2024-09-09 RX ORDER — LOSARTAN POTASSIUM 100 MG/1
100 TABLET ORAL DAILY
Status: DISCONTINUED | OUTPATIENT
Start: 2024-09-10 | End: 2024-09-11 | Stop reason: HOSPADM

## 2024-09-09 RX ORDER — HEPARIN SODIUM 5000 [USP'U]/ML
5000 INJECTION, SOLUTION INTRAVENOUS; SUBCUTANEOUS EVERY 8 HOURS SCHEDULED
Status: DISCONTINUED | OUTPATIENT
Start: 2024-09-09 | End: 2024-09-11 | Stop reason: HOSPADM

## 2024-09-09 RX ADMIN — SODIUM CHLORIDE, PRESERVATIVE FREE 10 ML: 5 INJECTION INTRAVENOUS at 23:22

## 2024-09-09 RX ADMIN — BUSPIRONE HYDROCHLORIDE 10 MG: 5 TABLET ORAL at 23:20

## 2024-09-09 RX ADMIN — HEPARIN SODIUM 5000 UNITS: 5000 INJECTION INTRAVENOUS; SUBCUTANEOUS at 23:22

## 2024-09-09 RX ADMIN — TORSEMIDE 20 MG: 20 TABLET ORAL at 23:21

## 2024-09-09 RX ADMIN — ATORVASTATIN CALCIUM 40 MG: 40 TABLET, FILM COATED ORAL at 23:21

## 2024-09-09 RX ADMIN — CLONIDINE HYDROCHLORIDE 0.2 MG: 0.1 TABLET ORAL at 23:21

## 2024-09-09 ASSESSMENT — PAIN - FUNCTIONAL ASSESSMENT: PAIN_FUNCTIONAL_ASSESSMENT: 0-10

## 2024-09-09 ASSESSMENT — PAIN SCALES - GENERAL: PAINLEVEL_OUTOF10: 4

## 2024-09-09 ASSESSMENT — PAIN DESCRIPTION - LOCATION: LOCATION: HEAD

## 2024-09-10 LAB
ALBUMIN SERPL-MCNC: 2.7 G/DL (ref 3.4–5)
ANION GAP SERPL CALCULATED.3IONS-SCNC: 11 MMOL/L (ref 3–16)
BASOPHILS # BLD: 0 K/UL (ref 0–0.2)
BASOPHILS NFR BLD: 0.7 %
BUN SERPL-MCNC: 35 MG/DL (ref 7–20)
CALCIUM SERPL-MCNC: 8.1 MG/DL (ref 8.3–10.6)
CHLORIDE SERPL-SCNC: 101 MMOL/L (ref 99–110)
CO2 SERPL-SCNC: 23 MMOL/L (ref 21–32)
CREAT SERPL-MCNC: 4.8 MG/DL (ref 0.6–1.1)
DEPRECATED RDW RBC AUTO: 16 % (ref 12.4–15.4)
EKG ATRIAL RATE: 87 BPM
EKG DIAGNOSIS: NORMAL
EKG P AXIS: 56 DEGREES
EKG P-R INTERVAL: 140 MS
EKG Q-T INTERVAL: 354 MS
EKG QRS DURATION: 78 MS
EKG QTC CALCULATION (BAZETT): 425 MS
EKG R AXIS: 39 DEGREES
EKG T AXIS: 89 DEGREES
EKG VENTRICULAR RATE: 87 BPM
EOSINOPHIL # BLD: 0.1 K/UL (ref 0–0.6)
EOSINOPHIL NFR BLD: 1 %
GFR SERPLBLD CREATININE-BSD FMLA CKD-EPI: 10 ML/MIN/{1.73_M2}
GLUCOSE BLD-MCNC: 110 MG/DL (ref 70–99)
GLUCOSE BLD-MCNC: 129 MG/DL (ref 70–99)
GLUCOSE BLD-MCNC: 194 MG/DL (ref 70–99)
GLUCOSE BLD-MCNC: 206 MG/DL (ref 70–99)
GLUCOSE SERPL-MCNC: 110 MG/DL (ref 70–99)
HCT VFR BLD AUTO: 24.8 % (ref 36–48)
HGB BLD-MCNC: 8 G/DL (ref 12–16)
LYMPHOCYTES # BLD: 1 K/UL (ref 1–5.1)
LYMPHOCYTES NFR BLD: 20.9 %
MCH RBC QN AUTO: 27.7 PG (ref 26–34)
MCHC RBC AUTO-ENTMCNC: 32.3 G/DL (ref 31–36)
MCV RBC AUTO: 85.7 FL (ref 80–100)
MONOCYTES # BLD: 0.3 K/UL (ref 0–1.3)
MONOCYTES NFR BLD: 6.7 %
NEUTROPHILS # BLD: 3.4 K/UL (ref 1.7–7.7)
NEUTROPHILS NFR BLD: 70.7 %
PERFORMED ON: ABNORMAL
PHOSPHATE SERPL-MCNC: 4.9 MG/DL (ref 2.5–4.9)
PLATELET # BLD AUTO: 262 K/UL (ref 135–450)
PMV BLD AUTO: 7 FL (ref 5–10.5)
POTASSIUM SERPL-SCNC: 5.2 MMOL/L (ref 3.5–5.1)
POTASSIUM SERPL-SCNC: 5.2 MMOL/L (ref 3.5–5.1)
RBC # BLD AUTO: 2.89 M/UL (ref 4–5.2)
SODIUM SERPL-SCNC: 135 MMOL/L (ref 136–145)
WBC # BLD AUTO: 4.9 K/UL (ref 4–11)

## 2024-09-10 PROCEDURE — 97166 OT EVAL MOD COMPLEX 45 MIN: CPT

## 2024-09-10 PROCEDURE — 97535 SELF CARE MNGMENT TRAINING: CPT

## 2024-09-10 PROCEDURE — 85025 COMPLETE CBC W/AUTO DIFF WBC: CPT

## 2024-09-10 PROCEDURE — 36415 COLL VENOUS BLD VENIPUNCTURE: CPT

## 2024-09-10 PROCEDURE — 80069 RENAL FUNCTION PANEL: CPT

## 2024-09-10 PROCEDURE — 93010 ELECTROCARDIOGRAM REPORT: CPT | Performed by: INTERNAL MEDICINE

## 2024-09-10 PROCEDURE — 97162 PT EVAL MOD COMPLEX 30 MIN: CPT

## 2024-09-10 PROCEDURE — 96372 THER/PROPH/DIAG INJ SC/IM: CPT

## 2024-09-10 PROCEDURE — 6370000000 HC RX 637 (ALT 250 FOR IP): Performed by: INTERNAL MEDICINE

## 2024-09-10 PROCEDURE — 2580000003 HC RX 258: Performed by: INTERNAL MEDICINE

## 2024-09-10 PROCEDURE — G0378 HOSPITAL OBSERVATION PER HR: HCPCS

## 2024-09-10 PROCEDURE — 6360000002 HC RX W HCPCS: Performed by: INTERNAL MEDICINE

## 2024-09-10 PROCEDURE — 90935 HEMODIALYSIS ONE EVALUATION: CPT

## 2024-09-10 PROCEDURE — 97116 GAIT TRAINING THERAPY: CPT

## 2024-09-10 PROCEDURE — 94640 AIRWAY INHALATION TREATMENT: CPT

## 2024-09-10 PROCEDURE — 94760 N-INVAS EAR/PLS OXIMETRY 1: CPT

## 2024-09-10 PROCEDURE — 6360000002 HC RX W HCPCS: Performed by: NURSE PRACTITIONER

## 2024-09-10 PROCEDURE — 97530 THERAPEUTIC ACTIVITIES: CPT

## 2024-09-10 PROCEDURE — 96374 THER/PROPH/DIAG INJ IV PUSH: CPT

## 2024-09-10 RX ORDER — HEPARIN SODIUM 1000 [USP'U]/ML
3200 INJECTION, SOLUTION INTRAVENOUS; SUBCUTANEOUS PRN
Status: DISCONTINUED | OUTPATIENT
Start: 2024-09-10 | End: 2024-09-11 | Stop reason: HOSPADM

## 2024-09-10 RX ORDER — HEPARIN SODIUM 1000 [USP'U]/ML
INJECTION, SOLUTION INTRAVENOUS; SUBCUTANEOUS
Status: DISPENSED
Start: 2024-09-10 | End: 2024-09-11

## 2024-09-10 RX ORDER — ALPRAZOLAM 0.5 MG
2 TABLET ORAL
Status: DISCONTINUED | OUTPATIENT
Start: 2024-09-10 | End: 2024-09-11 | Stop reason: HOSPADM

## 2024-09-10 RX ORDER — ONDANSETRON 2 MG/ML
4 INJECTION INTRAMUSCULAR; INTRAVENOUS EVERY 6 HOURS PRN
Status: DISCONTINUED | OUTPATIENT
Start: 2024-09-10 | End: 2024-09-11 | Stop reason: HOSPADM

## 2024-09-10 RX ADMIN — ACETAMINOPHEN 650 MG: 325 TABLET ORAL at 03:57

## 2024-09-10 RX ADMIN — CLONIDINE HYDROCHLORIDE 0.2 MG: 0.1 TABLET ORAL at 06:24

## 2024-09-10 RX ADMIN — SODIUM CHLORIDE, PRESERVATIVE FREE 10 ML: 5 INJECTION INTRAVENOUS at 04:27

## 2024-09-10 RX ADMIN — BUSPIRONE HYDROCHLORIDE 10 MG: 5 TABLET ORAL at 21:23

## 2024-09-10 RX ADMIN — LOSARTAN POTASSIUM 100 MG: 100 TABLET, FILM COATED ORAL at 09:37

## 2024-09-10 RX ADMIN — ATORVASTATIN CALCIUM 40 MG: 40 TABLET, FILM COATED ORAL at 21:23

## 2024-09-10 RX ADMIN — SODIUM CHLORIDE, PRESERVATIVE FREE 10 ML: 5 INJECTION INTRAVENOUS at 21:25

## 2024-09-10 RX ADMIN — CLONIDINE HYDROCHLORIDE 0.2 MG: 0.1 TABLET ORAL at 21:24

## 2024-09-10 RX ADMIN — TORSEMIDE 20 MG: 20 TABLET ORAL at 09:35

## 2024-09-10 RX ADMIN — INSULIN GLARGINE 5 UNITS: 100 INJECTION, SOLUTION SUBCUTANEOUS at 21:25

## 2024-09-10 RX ADMIN — ASPIRIN 81 MG: 81 TABLET, COATED ORAL at 09:37

## 2024-09-10 RX ADMIN — SEVELAMER CARBONATE 800 MG: 800 TABLET, FILM COATED ORAL at 09:37

## 2024-09-10 RX ADMIN — PREGABALIN 75 MG: 75 CAPSULE ORAL at 09:37

## 2024-09-10 RX ADMIN — ALPRAZOLAM 2 MG: 0.5 TABLET ORAL at 19:07

## 2024-09-10 RX ADMIN — HEPARIN SODIUM 5000 UNITS: 5000 INJECTION INTRAVENOUS; SUBCUTANEOUS at 22:05

## 2024-09-10 RX ADMIN — TORSEMIDE 20 MG: 20 TABLET ORAL at 18:47

## 2024-09-10 RX ADMIN — ONDANSETRON 4 MG: 2 INJECTION INTRAMUSCULAR; INTRAVENOUS at 04:27

## 2024-09-10 RX ADMIN — BUSPIRONE HYDROCHLORIDE 10 MG: 5 TABLET ORAL at 09:38

## 2024-09-10 RX ADMIN — SODIUM CHLORIDE, PRESERVATIVE FREE 10 ML: 5 INJECTION INTRAVENOUS at 09:38

## 2024-09-10 RX ADMIN — PANTOPRAZOLE SODIUM 40 MG: 40 TABLET, DELAYED RELEASE ORAL at 18:48

## 2024-09-10 RX ADMIN — PANTOPRAZOLE SODIUM 40 MG: 40 TABLET, DELAYED RELEASE ORAL at 06:23

## 2024-09-10 RX ADMIN — HEPARIN SODIUM 5000 UNITS: 5000 INJECTION INTRAVENOUS; SUBCUTANEOUS at 06:24

## 2024-09-10 RX ADMIN — TIOTROPIUM BROMIDE AND OLODATEROL 2 PUFF: 3.124; 2.736 SPRAY, METERED RESPIRATORY (INHALATION) at 10:34

## 2024-09-10 ASSESSMENT — PAIN SCALES - GENERAL
PAINLEVEL_OUTOF10: 2
PAINLEVEL_OUTOF10: 6

## 2024-09-10 ASSESSMENT — PAIN DESCRIPTION - DESCRIPTORS: DESCRIPTORS: ACHING

## 2024-09-10 ASSESSMENT — PAIN DESCRIPTION - LOCATION: LOCATION: HEAD

## 2024-09-11 VITALS
BODY MASS INDEX: 22.15 KG/M2 | HEART RATE: 82 BPM | SYSTOLIC BLOOD PRESSURE: 105 MMHG | HEIGHT: 67 IN | OXYGEN SATURATION: 94 % | DIASTOLIC BLOOD PRESSURE: 61 MMHG | TEMPERATURE: 98.6 F | WEIGHT: 141.09 LBS | RESPIRATION RATE: 16 BRPM

## 2024-09-11 LAB
ALBUMIN SERPL-MCNC: 2.6 G/DL (ref 3.4–5)
ANION GAP SERPL CALCULATED.3IONS-SCNC: 10 MMOL/L (ref 3–16)
BASOPHILS # BLD: 0 K/UL (ref 0–0.2)
BASOPHILS NFR BLD: 0.6 %
BUN SERPL-MCNC: 27 MG/DL (ref 7–20)
CALCIUM SERPL-MCNC: 8.2 MG/DL (ref 8.3–10.6)
CHLORIDE SERPL-SCNC: 102 MMOL/L (ref 99–110)
CO2 SERPL-SCNC: 24 MMOL/L (ref 21–32)
CREAT SERPL-MCNC: 3.8 MG/DL (ref 0.6–1.1)
DEPRECATED RDW RBC AUTO: 16.1 % (ref 12.4–15.4)
EOSINOPHIL # BLD: 0.1 K/UL (ref 0–0.6)
EOSINOPHIL NFR BLD: 1.1 %
GFR SERPLBLD CREATININE-BSD FMLA CKD-EPI: 14 ML/MIN/{1.73_M2}
GLUCOSE BLD-MCNC: 120 MG/DL (ref 70–99)
GLUCOSE BLD-MCNC: 155 MG/DL (ref 70–99)
GLUCOSE SERPL-MCNC: 131 MG/DL (ref 70–99)
HCT VFR BLD AUTO: 23.6 % (ref 36–48)
HGB BLD-MCNC: 7.7 G/DL (ref 12–16)
LYMPHOCYTES # BLD: 1.1 K/UL (ref 1–5.1)
LYMPHOCYTES NFR BLD: 20.9 %
MAGNESIUM SERPL-MCNC: 1.8 MG/DL (ref 1.8–2.4)
MCH RBC QN AUTO: 28 PG (ref 26–34)
MCHC RBC AUTO-ENTMCNC: 32.6 G/DL (ref 31–36)
MCV RBC AUTO: 85.8 FL (ref 80–100)
MONOCYTES # BLD: 0.5 K/UL (ref 0–1.3)
MONOCYTES NFR BLD: 8.7 %
NEUTROPHILS # BLD: 3.6 K/UL (ref 1.7–7.7)
NEUTROPHILS NFR BLD: 68.7 %
PERFORMED ON: ABNORMAL
PERFORMED ON: ABNORMAL
PHOSPHATE SERPL-MCNC: 4.1 MG/DL (ref 2.5–4.9)
PLATELET # BLD AUTO: 244 K/UL (ref 135–450)
PMV BLD AUTO: 6.8 FL (ref 5–10.5)
POTASSIUM SERPL-SCNC: 5.4 MMOL/L (ref 3.5–5.1)
RBC # BLD AUTO: 2.75 M/UL (ref 4–5.2)
SODIUM SERPL-SCNC: 136 MMOL/L (ref 136–145)
WBC # BLD AUTO: 5.3 K/UL (ref 4–11)

## 2024-09-11 PROCEDURE — 6360000002 HC RX W HCPCS: Performed by: INTERNAL MEDICINE

## 2024-09-11 PROCEDURE — 36415 COLL VENOUS BLD VENIPUNCTURE: CPT

## 2024-09-11 PROCEDURE — 85025 COMPLETE CBC W/AUTO DIFF WBC: CPT

## 2024-09-11 PROCEDURE — G0378 HOSPITAL OBSERVATION PER HR: HCPCS

## 2024-09-11 PROCEDURE — 96372 THER/PROPH/DIAG INJ SC/IM: CPT

## 2024-09-11 PROCEDURE — 83735 ASSAY OF MAGNESIUM: CPT

## 2024-09-11 PROCEDURE — 2580000003 HC RX 258: Performed by: INTERNAL MEDICINE

## 2024-09-11 PROCEDURE — 80069 RENAL FUNCTION PANEL: CPT

## 2024-09-11 PROCEDURE — 94760 N-INVAS EAR/PLS OXIMETRY 1: CPT

## 2024-09-11 PROCEDURE — 6370000000 HC RX 637 (ALT 250 FOR IP): Performed by: INTERNAL MEDICINE

## 2024-09-11 PROCEDURE — 90935 HEMODIALYSIS ONE EVALUATION: CPT

## 2024-09-11 PROCEDURE — 94640 AIRWAY INHALATION TREATMENT: CPT

## 2024-09-11 RX ADMIN — CLONIDINE HYDROCHLORIDE 0.2 MG: 0.1 TABLET ORAL at 06:02

## 2024-09-11 RX ADMIN — SEVELAMER CARBONATE 800 MG: 800 TABLET, FILM COATED ORAL at 11:23

## 2024-09-11 RX ADMIN — PREGABALIN 75 MG: 75 CAPSULE ORAL at 11:22

## 2024-09-11 RX ADMIN — TIOTROPIUM BROMIDE AND OLODATEROL 2 PUFF: 3.124; 2.736 SPRAY, METERED RESPIRATORY (INHALATION) at 12:42

## 2024-09-11 RX ADMIN — SODIUM CHLORIDE, PRESERVATIVE FREE 10 ML: 5 INJECTION INTRAVENOUS at 11:23

## 2024-09-11 RX ADMIN — BUSPIRONE HYDROCHLORIDE 10 MG: 5 TABLET ORAL at 11:22

## 2024-09-11 RX ADMIN — ASPIRIN 81 MG: 81 TABLET, COATED ORAL at 11:22

## 2024-09-11 RX ADMIN — TORSEMIDE 20 MG: 20 TABLET ORAL at 11:23

## 2024-09-11 RX ADMIN — LOSARTAN POTASSIUM 100 MG: 100 TABLET, FILM COATED ORAL at 11:23

## 2024-09-11 RX ADMIN — HEPARIN SODIUM 3200 UNITS: 1000 INJECTION INTRAVENOUS; SUBCUTANEOUS at 10:48

## 2024-09-11 RX ADMIN — ALPRAZOLAM 2 MG: 0.5 TABLET ORAL at 11:22

## 2024-09-11 RX ADMIN — PANTOPRAZOLE SODIUM 40 MG: 40 TABLET, DELAYED RELEASE ORAL at 06:02

## 2024-09-11 RX ADMIN — HEPARIN SODIUM 5000 UNITS: 5000 INJECTION INTRAVENOUS; SUBCUTANEOUS at 06:03

## 2024-09-12 ENCOUNTER — CARE COORDINATION (OUTPATIENT)
Dept: CASE MANAGEMENT | Age: 49
End: 2024-09-12

## 2024-09-12 DIAGNOSIS — R53.81 PHYSICAL DECONDITIONING: Primary | ICD-10-CM

## 2024-09-12 PROCEDURE — 1111F DSCHRG MED/CURRENT MED MERGE: CPT | Performed by: FAMILY MEDICINE

## 2024-09-17 ENCOUNTER — TELEPHONE (OUTPATIENT)
Dept: FAMILY MEDICINE CLINIC | Age: 49
End: 2024-09-17

## 2024-09-17 ENCOUNTER — CARE COORDINATION (OUTPATIENT)
Dept: CASE MANAGEMENT | Age: 49
End: 2024-09-17

## 2024-09-19 ENCOUNTER — CARE COORDINATION (OUTPATIENT)
Dept: CASE MANAGEMENT | Age: 49
End: 2024-09-19

## 2024-09-23 ENCOUNTER — CARE COORDINATION (OUTPATIENT)
Dept: CASE MANAGEMENT | Age: 49
End: 2024-09-23

## 2024-09-23 DIAGNOSIS — Z79.4 TYPE 2 DIABETES MELLITUS WITH DIABETIC POLYNEUROPATHY, WITH LONG-TERM CURRENT USE OF INSULIN (HCC): ICD-10-CM

## 2024-09-23 DIAGNOSIS — E11.42 TYPE 2 DIABETES MELLITUS WITH DIABETIC POLYNEUROPATHY, WITH LONG-TERM CURRENT USE OF INSULIN (HCC): ICD-10-CM

## 2024-09-23 RX ORDER — PREGABALIN 75 MG/1
75 CAPSULE ORAL DAILY
Qty: 90 CAPSULE | Refills: 0 | Status: SHIPPED | OUTPATIENT
Start: 2024-09-23 | End: 2024-12-22

## 2024-09-26 ENCOUNTER — OFFICE VISIT (OUTPATIENT)
Dept: FAMILY MEDICINE CLINIC | Age: 49
End: 2024-09-26

## 2024-09-26 VITALS
HEIGHT: 67 IN | WEIGHT: 147.4 LBS | OXYGEN SATURATION: 96 % | HEART RATE: 86 BPM | SYSTOLIC BLOOD PRESSURE: 120 MMHG | DIASTOLIC BLOOD PRESSURE: 62 MMHG | BODY MASS INDEX: 23.13 KG/M2

## 2024-09-26 DIAGNOSIS — Z12.11 COLON CANCER SCREENING: ICD-10-CM

## 2024-09-26 DIAGNOSIS — E78.5 TYPE 2 DIABETES MELLITUS WITH HYPERLIPIDEMIA (HCC): ICD-10-CM

## 2024-09-26 DIAGNOSIS — R97.1 ELEVATED CA-125: ICD-10-CM

## 2024-09-26 DIAGNOSIS — Z09 HOSPITAL DISCHARGE FOLLOW-UP: Primary | ICD-10-CM

## 2024-09-26 DIAGNOSIS — Z23 FLU VACCINE NEED: ICD-10-CM

## 2024-09-26 DIAGNOSIS — E11.69 TYPE 2 DIABETES MELLITUS WITH HYPERLIPIDEMIA (HCC): ICD-10-CM

## 2024-09-26 DIAGNOSIS — Z80.0 FAMILY HISTORY OF COLON CANCER IN FATHER: ICD-10-CM

## 2024-09-26 DIAGNOSIS — R60.1 GENERALIZED EDEMA: ICD-10-CM

## 2024-09-26 DIAGNOSIS — K25.9 GASTRIC ULCER WITHOUT HEMORRHAGE OR PERFORATION, UNSPECIFIED CHRONICITY: ICD-10-CM

## 2024-09-26 DIAGNOSIS — N18.6 TYPE 2 DM WITH HYPERTENSION AND ESRD ON DIALYSIS (HCC): ICD-10-CM

## 2024-09-26 DIAGNOSIS — Z99.2 TYPE 2 DM WITH HYPERTENSION AND ESRD ON DIALYSIS (HCC): ICD-10-CM

## 2024-09-26 DIAGNOSIS — E11.22 TYPE 2 DM WITH HYPERTENSION AND ESRD ON DIALYSIS (HCC): ICD-10-CM

## 2024-09-26 DIAGNOSIS — I12.0 TYPE 2 DM WITH HYPERTENSION AND ESRD ON DIALYSIS (HCC): ICD-10-CM

## 2024-09-26 DIAGNOSIS — R18.8 ASCITES CONTROLLED WITH MEDICATION: ICD-10-CM

## 2024-09-26 ASSESSMENT — PATIENT HEALTH QUESTIONNAIRE - PHQ9
5. POOR APPETITE OR OVEREATING: NOT AT ALL
SUM OF ALL RESPONSES TO PHQ QUESTIONS 1-9: 0
SUM OF ALL RESPONSES TO PHQ QUESTIONS 1-9: 0
1. LITTLE INTEREST OR PLEASURE IN DOING THINGS: NOT AT ALL
4. FEELING TIRED OR HAVING LITTLE ENERGY: NOT AT ALL
10. IF YOU CHECKED OFF ANY PROBLEMS, HOW DIFFICULT HAVE THESE PROBLEMS MADE IT FOR YOU TO DO YOUR WORK, TAKE CARE OF THINGS AT HOME, OR GET ALONG WITH OTHER PEOPLE: NOT DIFFICULT AT ALL
SUM OF ALL RESPONSES TO PHQ QUESTIONS 1-9: 0
6. FEELING BAD ABOUT YOURSELF - OR THAT YOU ARE A FAILURE OR HAVE LET YOURSELF OR YOUR FAMILY DOWN: NOT AT ALL
SUM OF ALL RESPONSES TO PHQ9 QUESTIONS 1 & 2: 0
SUM OF ALL RESPONSES TO PHQ QUESTIONS 1-9: 0
9. THOUGHTS THAT YOU WOULD BE BETTER OFF DEAD, OR OF HURTING YOURSELF: NOT AT ALL
3. TROUBLE FALLING OR STAYING ASLEEP: NOT AT ALL
8. MOVING OR SPEAKING SO SLOWLY THAT OTHER PEOPLE COULD HAVE NOTICED. OR THE OPPOSITE, BEING SO FIGETY OR RESTLESS THAT YOU HAVE BEEN MOVING AROUND A LOT MORE THAN USUAL: NOT AT ALL
7. TROUBLE CONCENTRATING ON THINGS, SUCH AS READING THE NEWSPAPER OR WATCHING TELEVISION: NOT AT ALL
2. FEELING DOWN, DEPRESSED OR HOPELESS: NOT AT ALL

## 2024-10-01 ENCOUNTER — CARE COORDINATION (OUTPATIENT)
Dept: CASE MANAGEMENT | Age: 49
End: 2024-10-01

## 2024-10-01 ENCOUNTER — APPOINTMENT (OUTPATIENT)
Dept: PHYSICAL THERAPY | Age: 49
End: 2024-10-01
Payer: COMMERCIAL

## 2024-10-01 ENCOUNTER — HOSPITAL ENCOUNTER (OUTPATIENT)
Dept: OCCUPATIONAL THERAPY | Age: 49
Setting detail: THERAPIES SERIES
Discharge: HOME | End: 2024-10-01
Attending: INTERNAL MEDICINE

## 2024-10-01 NOTE — PLAN OF CARE
{Location:[facility]}     Occupational Therapy Initial Evaluation Certification      Dear Judit Wise MD,    We had the pleasure of evaluating the following patient for occupational therapy services at [facility].  A summary of our findings can be found in the initial assessment below.  This includes our plan of care.  If you have any questions or concerns regarding these findings, please do not hesitate to contact me at the office phone number listed above.  Thank you for the referral.     Physician Signature:_______________________________Date:__________________  By signing above (or electronic signature), therapist’s plan is approved by physician       Occupational Therapy: TREATMENT/PROGRESS NOTE   Patient: Kristine Ramirez (49 y.o. female)   Examination Date: 10/01/2024   :  1975 MRN: [id number]   Visit #: Visit count could not be calculated. Make sure you are using a visit which is associated with an episode.  Insurance Allowable Auth Needed   *** []Yes    []No    Insurance: [place]: [employer] [place] / Plan: [employer] [place] / Product Type: *No Product type* /   Insurance ID: [id number] - (Medicare Managed)  Secondary Insurance (if applicable): [employer]*   Treatment Diagnosis: ***  No diagnosis found.   Medical Diagnosis:  Physical deconditioning [R53.81]   Referring Physician: Judit Wise MD  PCP: Damon Mireles MD     DOI:    DOS:    Plan of care signed (Y/N):     Date of Patient follow up with Physician:      Marina Date: 10/1/24  Progress Report/POC: {Progress:71452::\"EVAL today\"}  POC update due: (10 visits /OR AUTH LIMITS, whichever is less) *** 10/31/2024                                             Precautions/ Contra-indications:           Latex allergy:  {YES/NO/NA:14299::\"NO\"}  Pacemaker:    {YES/NO/NA:84143::\"NO\"}  Contraindications for Manipulation: {Contraindications to Manipulation:05568::\"None\"}  Other:    Red  ANJELICA ambulatory encounter  FAMILY PRACTICE OFFICE VISIT    CHIEF COMPLAINT:    Chief Complaint   Patient presents with   • Follow-up     4 day follow up for Right otitis externa       SUBJECTIVE:  Rene Jenkins is a 88 year old male who presented requesting evaluation for a follow up visit.       1.     Review of systems:   Constitutional: Negative for fever and chills.   Skin: Negative for rash.   Respiratory: Negative for cough or shortness of breath.   Cardiovascular: Negative for chest pain or chest pressure.   Gastrointestinal: Negative for nausea, vomiting, diarrhea or abdominal pain.   Extremities: Negative for joint swelling or joint pain.       OBJECTIVE:  PROBLEM LIST:   Patient Active Problem List   Diagnosis   • Essential hypertension   • Pure hypercholesterolemia   • Cerebral infarction (CMS/HCC)   • Unspecified hypothyroidism   • Chronic diarrhea       PAST HISTORIES:   I have reviewed the past medical history, family history, social history, medications and allergies listed in the medical record as obtained by my nursing staff and support staff and agree with their documentation.  ALLERGIES:  No Known Allergies  Current Outpatient Prescriptions   Medication Sig Dispense Refill   • neomycin-colistin-hydrocortisone-thonzonium (CORTISPORIN-TC) 3.3-3-10-0.5 MG/ML otic suspension Apply over ear wick, 3-4 drops into right ear 4 times a day for 10 days. 10 mL 0   • ciprofloxacin (CIPRO) 500 MG tablet Take 1 tablet by mouth every 12 hours for 10 days. 20 tablet 0   • atorvastatin (LIPITOR) 20 MG tablet TAKE 1 TABLET BY MOUTH DAILY 90 tablet 0   • levothyroxine (SYNTHROID, LEVOTHROID) 125 MCG tablet TAKE 1 TABLET BY MOUTH DAILY 90 tablet 0   • lisinopril-hydroCHLOROthiazide (PRINZIDE,ZESTORETIC) 20-25 MG per tablet TAKE 1/2 TABLET BY MOUTH DAILY 45 tablet 3   • folic acid (FOLATE) 1 MG tablet Take 1 tablet by mouth daily. 90 tablet 3   • aspirin 81 MG tablet Take 81 mg by mouth daily.     • cholestyramine  (QUESTRAN) 4 G packet Take 1 packet by mouth 3 times daily (with meals).     • cholecalciferol (VITAMIN D3) 1000 UNITS tablet Take 3,000 Units by mouth daily.     • Pyridoxine HCl (VITAMIN B-6) 100 MG tablet Take 100 mg by mouth daily.     • cyanocobalamin (VITAMIN B-12) 500 MCG tablet Take 500 mcg by mouth daily.       No current facility-administered medications for this visit.      Immunization History   Administered Date(s) Administered   • Zoster Shingles 10/26/2012     Past Medical History:   Diagnosis Date   • Aortic valve disorder    • CKD (chronic kidney disease), stage III    • DVT (deep venous thrombosis) (CMS/[facility]) 12/29/2004    Left lower extremity   • Essential (primary) hypertension    • History of tachycardia    • Hypercholesterolemia    • Hypothyroidism    • Impaired glucose tolerance    • Left peroneal nerve palsy     History of   • Mitral valve disorder    • Osteoarthritis of hip    • Stroke (CMS/[facility])    • Vitamin D deficiency      Past Surgical History:   Procedure Laterality Date   • Appendectomy  5/15/2013   • Colonoscopy w biopsy  5/7/2011   • Eye surgery Left 2010    Retina   • Hip surgery     • Prostatectomy  2002   • Remv cataract extracap insert lens Left 2011     Social History     Social History   • Marital status:      Spouse name: N/A   • Number of children: N/A   • Years of education: N/A     Social History Main Topics   • Smoking status: Never Smoker   • Smokeless tobacco: Never Used   • Alcohol use No   • Drug use: No   • Sexual activity: Not Asked     Other Topics Concern   • None     Social History Narrative   • None     History   Smoking Status   • Never Smoker   Smokeless Tobacco   • Never Used     History   Alcohol Use No     Family History   Problem Relation Age of Onset   • Heart disease Mother    • Stroke Mother    • Cancer, Prostate Father      Health Maintenance   Topic Date Due   • Medicare Wellness 65+  07/30/2018 (Originally 1/25/2017)   • DTaP/Tdap/Td Vaccine (1  - Tdap) 07/30/2018 (Originally 7/17/1949)   • Pneumococcal Vaccine 65+ Low/Medium Risk (1 of 2 - PCV13) 07/30/2018 (Originally 7/17/1995)   • Influenza Vaccine (1) 09/01/2018   • Depression Screening  02/13/2019       Physical Exam   Constitutional: He is oriented to person, place, and time and well-developed, well-nourished, and in no distress. No distress.   HENT:   Head: Normocephalic and atraumatic.   Mouth/Throat: No oropharyngeal exudate.   Eyes: Conjunctivae are normal. Right eye exhibits no discharge. Left eye exhibits no discharge. No scleral icterus.   Neck: Normal range of motion. Neck supple. No tracheal deviation present. No thyromegaly present.   Lymphadenopathy:     He has no cervical adenopathy.   Neurological: He is alert and oriented to person, place, and time. No cranial nerve deficit.   Skin: Skin is warm and dry. He is not diaphoretic.   Psychiatric: Mood, memory, affect and judgment normal.   Nursing note and vitals reviewed.        LAB RESULTS:   All pertinent laboratory results were reviewed.    ASSESSMENT:   No diagnosis found.    PLAN:   No orders of the defined types were placed in this encounter.      ***    No Follow-up on file.    Instructions provided as documented in the after visit summary.    The patient indicated understanding of the diagnosis and agreed with the plan of care.       On 7/30/2018, Anjana DE LA ROSA RN scribed the services personally performed by Zane Sinha MD

## 2024-10-01 NOTE — CARE COORDINATION
Care Transitions Outreach Attempt    Per Epic, pt has appts today, this nurse will follow up at a later date.      Patient: Kristine Ramirez Patient : 1975 MRN: [id number]    Last Discharge Facility       Date Complaint Diagnosis Description Type Department Provider    24 Fall General weakness ... ED to Hosp-Admission (Discharged) (ADMITTED) [facility]A Judit Wise MD                  Noted following upcoming appointments from discharge chart review:   BS follow up appointment(s):   Future Appointments   Date Time Provider Department [place]   10/1/2024 10:20 AM Nilda Mcdaniel PT [facility]   10/1/2024 11:00 AM Isa Shay OT [facility] OT [facility]   10/31/2024 11:00 AM Damon Mireles MD [facility] MED [facility] DEP     Non-BS  follow up appointment(s):

## 2024-10-02 ENCOUNTER — CARE COORDINATION (OUTPATIENT)
Dept: CASE MANAGEMENT | Age: 49
End: 2024-10-02

## 2024-10-02 NOTE — CARE COORDINATION
Care Transitions Note    Follow Up Call     Patient Current Location:  Home: [address]  [address]  [facility] [address]    Care Transition Nurse contacted the patient by telephone. Verified name and  as identifiers.    Additional needs identified to be addressed with provider   No needs identified                 Method of communication with provider: none.    Care Summary Note: Pt states doing well, no issues or concerns.At dialysis.  Agreed to more CTC f/u calls.        Advance Care Planning:   Does patient have an Advance Directive: reviewed during previous call, see note. .    Medication Review:  No changes since last call.     Remote Patient Monitoring:  Offered patient enrollment in the Remote Patient Monitoring (RPM) program for in-home monitoring: future call.    Assessments:  Care Transitions Subsequent and Final Call    Subsequent and Final Calls  Do you have any ongoing symptoms?: No  Have your medications changed?: No  Do you have any questions related to your medications?: No  Do you currently have any active services?: No  Are you currently active with any services?: Outpatient/Community Services  Do you have any needs or concerns that I can assist you with?: No  Identified Barriers: None  Care Transitions Interventions  No Identified Needs  Other Interventions:              Follow Up Appointment:   Reviewed upcoming appointment(s).  Future Appointments         Provider Specialty Dept Phone    10/15/2024 8:00 AM Donavon Bee PT Physical Therapy [phone]    10/15/2024 9:00 AM Isa Shay, OT Occupational Therapy [phone]    10/31/2024 11:00 AM Damon Mireles MD Family Medicine [phone]            Care Transition Nurse provided contact information.  Plan for follow-up call in 6-10 days based on severity of symptoms and risk factors.  Plan for next call: self management-       Javan Goncalves RN

## 2024-10-08 ENCOUNTER — APPOINTMENT (OUTPATIENT)
Dept: PHYSICAL THERAPY | Age: 49
End: 2024-10-08
Payer: COMMERCIAL

## 2024-10-08 NOTE — PROGRESS NOTES
Kristine Ramirez   49 y.o. female   1975    Virtual visit encounter type:   [] Doxy.me  [] Telephone w/o video  [x] MyChart  [] Other:     General disclaimer regarding telemedicine (virtual) visits:  Kristine Ramirez is a 49 y.o. female is being evaluated by a virtual visit (video visit) and/or telephone (without video) encounter to address their concern(s) as mentioned above.      Kristine Ramirez, was evaluated through a synchronous (real-time) audio-video encounter. The patient (or guardian if applicable) is aware that this is a billable service, which includes applicable co-pays. This Virtual Visit was conducted with patient's (and/or legal guardian's) consent. Patient identification was verified, and a caregiver was present when appropriate.   The patient was located at Home: [id number] [place] [address] [address]  [facility] [address]  Provider was located at Facility (Appt Dept): [address] [place] [address] [address]  [place] [address]  Confirm you are appropriately licensed, registered, or certified to deliver care in the [facility] where the patient is located as indicated above. If you are not or unsure, please re-schedule the visit: Yes, I confirm.      This patient was last seen by me on 9/26/2024.  During our last office visit, the main issue(s) we focused on were:  -Hospital D/C follow-up.    HPI:  Chief Complaint   Patient presents with    Follow-up     DM, HTN, HLD, Wants new cholesterol medication      Interval history:    [x] New health issue(s)/concern(s):    Ms. Kristine Ramirez is a  female with PMH significant for ESRD, type II DM, HTN, migraine, generalized anxiety & panic attack, COPD.  She has been receiving routine dialysis sessions with [facility] (Dr. Hliliard and associates).       Ms. Ramirez was hospitalized at [facility] from 9/1-9/6/2024 for issues of ascites that required large volume paracentesis with removal of 17.5L on 3/31/2024 (day before admission).  Cytology

## 2024-10-10 ENCOUNTER — CARE COORDINATION (OUTPATIENT)
Dept: CASE MANAGEMENT | Age: 49
End: 2024-10-10

## 2024-10-10 NOTE — CARE COORDINATION
Care Transitions Note    Follow Up Call     Attempted to reach patient for transitions of care follow up.  Unable to reach patient.      Outreach Attempts:   Unable to leave message.     Care Summary Note:     Follow Up Appointment:   Future Appointments         Provider Specialty Dept Phone    10/15/2024 8:00 AM Donavon Bee PT Physical Therapy [phone]    10/15/2024 9:00 AM Isa Shay OT Occupational Therapy [phone]    10/31/2024 11:00 AM Damon Mireles MD Family Medicine [phone]            Plan for follow-up call in 2-5 days based on severity of symptoms and risk factors. Plan for next call: self management-     Javan Goncalves RN

## 2024-10-14 ENCOUNTER — CARE COORDINATION (OUTPATIENT)
Dept: CASE MANAGEMENT | Age: 49
End: 2024-10-14

## 2024-10-14 NOTE — CARE COORDINATION
Care Transitions Note    Final Call     Patient Current Location:  Home: [id number] [place] [address]  [address]  [facility] [address]    Care Transition Nurse contacted the patient by telephone. Verified name and  as identifiers.    Patient graduated from the Care Transitions program on 10/14/24.  Patient/family verbalizes confidence in the ability to self-manage at this time..      Advance Care Planning:   Does patient have an Advance Directive: reviewed during previous call, see note. .    Handoff:   Patient/family agreeable to AC outreach.      Care Summary Note: Pt states doing well, no issues or concerns. Will be starting OUTPT therapy. No need for further f/u CTC calls.        Assessments:  Care Transitions Subsequent and Final Call    Subsequent and Final Calls  Do you have any ongoing symptoms?: No  Have your medications changed?: No  Do you have any questions related to your medications?: No  Do you currently have any active services?: Yes  Are you currently active with any services?: Outpatient/Community Services  Do you have any needs or concerns that I can assist you with?: No  Identified Barriers: None  Care Transitions Interventions  No Identified Needs  Other Interventions:              Upcoming Appointments:    Future Appointments         Provider Specialty Dept Phone    10/15/2024 8:00 AM Donavon Bee PT Physical Therapy [phone]    10/15/2024  9:00 AM Isa Shay, OT Occupational Therapy [phone]    10/31/2024 11:00 AM Damon Mireles MD Family Medicine [phone]            Patient has agreed to contact primary care provider and/or specialist for any further questions, concerns, or needs.    Javan Goncalves RN

## 2024-10-15 ENCOUNTER — HOSPITAL ENCOUNTER (OUTPATIENT)
Dept: PHYSICAL THERAPY | Age: 49
Setting detail: THERAPIES SERIES
Discharge: HOME | End: 2024-10-15
Payer: COMMERCIAL

## 2024-10-15 ENCOUNTER — HOSPITAL ENCOUNTER (OUTPATIENT)
Dept: OCCUPATIONAL THERAPY | Age: 49
Setting detail: THERAPIES SERIES
Discharge: HOME | End: 2024-10-15
Attending: INTERNAL MEDICINE
Payer: COMMERCIAL

## 2024-10-15 DIAGNOSIS — Z74.09 IMPAIRED FUNCTIONAL MOBILITY, BALANCE, GAIT, AND ENDURANCE: Primary | ICD-10-CM

## 2024-10-15 DIAGNOSIS — R29.898 IMPAIRED STRENGTH OF HIP MUSCLES: ICD-10-CM

## 2024-10-15 PROCEDURE — 97165 OT EVAL LOW COMPLEX 30 MIN: CPT

## 2024-10-15 PROCEDURE — 97162 PT EVAL MOD COMPLEX 30 MIN: CPT

## 2024-10-15 PROCEDURE — 97530 THERAPEUTIC ACTIVITIES: CPT

## 2024-10-15 NOTE — PLAN OF CARE
and STM  [x] Modalities as needed that may include: NONE  [x] Patient education on joint protection, postural re-education, activity modification, progression of HEP.        [] Aquatic Therapy    Plan: POC initiated as per evaluation    Electronically Signed by: FRED SUAREZ OT    Date: 10/15/2024    Note: Portions of this note have been templated and/or copied from initial evaluation, reassessments and prior notes for documentation efficiency.

## 2024-10-15 NOTE — PLAN OF CARE
rolling walker (RW) to demonstrate improved endurance for community ambulation.  [] Progressing: [] Met: [] Not Met: [] Adjusted    Overall Progression Towards Functional goals/ Treatment Progress Update:  [] Patient is progressing as expected towards functional goals listed.    [] Progression is slowed due to complexities/Impairments listed.  [] Progression has been slowed due to co-morbidities.  [x] Plan just implemented, too soon (<30days) to assess goals progression   [] Goals require adjustment due to lack of progress  [] Patient is not progressing as expected and requires additional follow up with physician  [] Other:     TREATMENT PLAN     Frequency/Duration: 2x/week for 6 weeks for the following treatment interventions:    Interventions:  Therapeutic Exercise (67866) including: strength training, ROM, and functional mobility  Therapeutic Activities (65017) including: functional mobility training and education.  Neuromuscular Re-education (18827) activation and proprioception, including postural re-education.    Gait Training (33851) for normalization of ambulation patterns and AD training.   Manual Therapy (64678) as indicated to include: Passive Range of Motion, Gr I-IV mobilizations, Soft Tissue Mobilization, Dry Needling/IASTM, and Myofascial Release  Modalities as needed that may include: Cryotherapy and Biofeedback  Patient education on activity modification and progression of HEP    Plan: POC initiated as per evaluation    Electronically Signed by Donavon Bee PT  Date: 10/15/2024     Note: Portions of this note have been templated and/or copied from initial evaluation, reassessments and prior notes for documentation efficiency.    Note: If patient does not return for scheduled/recommended follow up visits, this note will serve as a discharge from care along with the most recent update on progress.    Temporary Neuro Evaluation

## 2024-10-18 ENCOUNTER — CARE COORDINATION (OUTPATIENT)
Dept: CARE COORDINATION | Age: 49
End: 2024-10-18

## 2024-10-18 NOTE — CARE COORDINATION
NELY received handoff from CTN. Called pt and she was at dialysis today. She did confirm she heard back from Nilda with [facility]. She said the resources were not very helpful, however they did receive a meal from [employer]. Pt isn't sure it's going to be a recurring meal or not.     Pt stated she didn't have any needs she could think of at this time, but agreed to call if that changes. ACM informed her I will f/u with her again, but please let me know if she needs anything or has questions or concerns before them. Pt agreed with this plan.

## 2024-10-22 ENCOUNTER — APPOINTMENT (OUTPATIENT)
Dept: PHYSICAL THERAPY | Age: 49
End: 2024-10-22
Attending: INTERNAL MEDICINE
Payer: COMMERCIAL

## 2024-10-22 ENCOUNTER — APPOINTMENT (OUTPATIENT)
Dept: OCCUPATIONAL THERAPY | Age: 49
End: 2024-10-22
Attending: INTERNAL MEDICINE
Payer: COMMERCIAL

## 2024-10-29 ENCOUNTER — APPOINTMENT (OUTPATIENT)
Dept: OCCUPATIONAL THERAPY | Age: 49
End: 2024-10-29
Attending: INTERNAL MEDICINE
Payer: COMMERCIAL

## 2024-10-29 ENCOUNTER — HOSPITAL ENCOUNTER (OUTPATIENT)
Dept: PHYSICAL THERAPY | Age: 49
Setting detail: THERAPIES SERIES
End: 2024-10-29
Attending: INTERNAL MEDICINE
Payer: COMMERCIAL

## 2024-10-31 ENCOUNTER — TELEMEDICINE (OUTPATIENT)
Dept: FAMILY MEDICINE CLINIC | Age: 49
End: 2024-10-31

## 2024-10-31 DIAGNOSIS — R60.1 GENERALIZED EDEMA: ICD-10-CM

## 2024-10-31 DIAGNOSIS — K52.1 DRUG-INDUCED DIARRHEA: ICD-10-CM

## 2024-10-31 DIAGNOSIS — E11.22 TYPE 2 DM WITH HYPERTENSION AND ESRD ON DIALYSIS (HCC): ICD-10-CM

## 2024-10-31 DIAGNOSIS — J43.9 PULMONARY EMPHYSEMA, UNSPECIFIED EMPHYSEMA TYPE (HCC): ICD-10-CM

## 2024-10-31 DIAGNOSIS — I10 TACHYCARDIA WITH HYPERTENSION: ICD-10-CM

## 2024-10-31 DIAGNOSIS — Z99.2 TYPE 2 DM WITH HYPERTENSION AND ESRD ON DIALYSIS (HCC): ICD-10-CM

## 2024-10-31 DIAGNOSIS — I12.0 TYPE 2 DM WITH HYPERTENSION AND ESRD ON DIALYSIS (HCC): ICD-10-CM

## 2024-10-31 DIAGNOSIS — R18.8 ASCITES CONTROLLED WITH MEDICATION: ICD-10-CM

## 2024-10-31 DIAGNOSIS — N18.6 TYPE 2 DM WITH HYPERTENSION AND ESRD ON DIALYSIS (HCC): ICD-10-CM

## 2024-10-31 DIAGNOSIS — R00.0 TACHYCARDIA WITH HYPERTENSION: ICD-10-CM

## 2024-10-31 DIAGNOSIS — G43.E09 CHRONIC MIGRAINE WITH AURA WITHOUT STATUS MIGRAINOSUS, NOT INTRACTABLE: Primary | ICD-10-CM

## 2024-10-31 RX ORDER — LEVALBUTEROL TARTRATE 45 UG/1
1 AEROSOL, METERED ORAL EVERY 8 HOURS PRN
Qty: 15 G | Refills: 5 | Status: SHIPPED | OUTPATIENT
Start: 2024-10-31 | End: 2025-10-31

## 2024-10-31 RX ORDER — RIZATRIPTAN BENZOATE 10 MG/1
10 TABLET ORAL
Qty: 9 TABLET | Refills: 2 | Status: SHIPPED | OUTPATIENT
Start: 2024-10-31 | End: 2024-10-31

## 2024-10-31 ASSESSMENT — ENCOUNTER SYMPTOMS
ABDOMINAL DISTENTION: 0
RHINORRHEA: 0
COUGH: 0
SINUS PRESSURE: 0
ABDOMINAL PAIN: 0
CHEST TIGHTNESS: 0
CONSTIPATION: 1
BLOOD IN STOOL: 0
VOMITING: 0
CONSTIPATION: 0
BACK PAIN: 0
DIARRHEA: 0
TROUBLE SWALLOWING: 0
VOMITING: 0
CHEST TIGHTNESS: 0
WHEEZING: 0
ABDOMINAL PAIN: 0
WHEEZING: 0
SINUS PRESSURE: 0
SHORTNESS OF BREATH: 0
ABDOMINAL DISTENTION: 0
BACK PAIN: 0
DIARRHEA: 0
BLOOD IN STOOL: 0
RHINORRHEA: 0
NAUSEA: 0
COUGH: 0
TROUBLE SWALLOWING: 0
SHORTNESS OF BREATH: 0
NAUSEA: 0

## 2024-11-04 ENCOUNTER — COMMUNITY CARE MANAGEMENT (OUTPATIENT)
Facility: CLINIC | Age: 49
End: 2024-11-04

## 2024-11-04 NOTE — PROGRESS NOTES
f/u assessment complete. Patient identified by named dated birth and mailing address, disclaimer provided      pt states she has fluid in the abd and they did an u/s on her uterus and ovaries, they are not sure why she is getting fluid, she is going to see an oncologist this month, per the record they drained off 10.5 L of clear red fluid from her peritoneum.  states she could breathe but she could not eat.      pt had an endoscopy and they found a lot of ulcerations in her esophagus.  pt has to go for a follow up as she was started on medications and they are going to biopsy the largest lesion.      VEIN MAPPING pt states the veins are no good but they have been able to use her R fistula.  she is to have the CVC removed but they are still having trouble using both needles every time she is in dialysis so they may need to leave the CVC.  pt states that once a week they seem to need to use the CVC     pt states she has to attend her dialysis at 90% before she can be actively on the list, states she is on the list but is not active.  pt states she is having issues with getting to dialysis due to the fluid on her abd.       pt has had two falls recently and hit her head but states that she has not had any injury other than a bump, states she has also missed dialysis due to the fall.      pt was graduated from this program today due to F/A status           [email or web address]  [phone]

## 2024-11-05 ENCOUNTER — HOSPITAL ENCOUNTER (OUTPATIENT)
Age: 49
Discharge: HOME | End: 2024-11-05
Payer: COMMERCIAL

## 2024-11-05 ENCOUNTER — HOSPITAL ENCOUNTER (OUTPATIENT)
Dept: PHYSICAL THERAPY | Age: 49
Setting detail: THERAPIES SERIES
Discharge: HOME | End: 2024-11-05
Attending: INTERNAL MEDICINE
Payer: COMMERCIAL

## 2024-11-05 ENCOUNTER — APPOINTMENT (OUTPATIENT)
Dept: OCCUPATIONAL THERAPY | Age: 49
End: 2024-11-05
Attending: INTERNAL MEDICINE
Payer: COMMERCIAL

## 2024-11-05 DIAGNOSIS — N18.6 TYPE 2 DM WITH HYPERTENSION AND ESRD ON DIALYSIS (HCC): ICD-10-CM

## 2024-11-05 DIAGNOSIS — E11.22 TYPE 2 DM WITH HYPERTENSION AND ESRD ON DIALYSIS (HCC): ICD-10-CM

## 2024-11-05 DIAGNOSIS — E78.5 TYPE 2 DIABETES MELLITUS WITH HYPERLIPIDEMIA (HCC): ICD-10-CM

## 2024-11-05 DIAGNOSIS — I12.0 TYPE 2 DM WITH HYPERTENSION AND ESRD ON DIALYSIS (HCC): ICD-10-CM

## 2024-11-05 DIAGNOSIS — Z99.2 TYPE 2 DM WITH HYPERTENSION AND ESRD ON DIALYSIS (HCC): ICD-10-CM

## 2024-11-05 DIAGNOSIS — E11.69 TYPE 2 DIABETES MELLITUS WITH HYPERLIPIDEMIA (HCC): ICD-10-CM

## 2024-11-05 LAB
ALBUMIN SERPL-MCNC: 3.8 G/DL (ref 3.4–5)
ALBUMIN/GLOB SERPL: 1.3 {RATIO} (ref 1.1–2.2)
ALP SERPL-CCNC: 204 U/L (ref 40–129)
ALT SERPL-CCNC: 17 U/L (ref 10–40)
ANION GAP SERPL CALCULATED.3IONS-SCNC: 20 MMOL/L (ref 3–16)
AST SERPL-CCNC: 21 U/L (ref 15–37)
BILIRUB SERPL-MCNC: 0.6 MG/DL (ref 0–1)
BUN SERPL-MCNC: 69 MG/DL (ref 7–20)
CALCIUM SERPL-MCNC: 8.3 MG/DL (ref 8.3–10.6)
CHLORIDE SERPL-SCNC: 86 MMOL/L (ref 99–110)
CHOLEST SERPL-MCNC: 117 MG/DL (ref 0–199)
CO2 SERPL-SCNC: 24 MMOL/L (ref 21–32)
CREAT SERPL-MCNC: 6.5 MG/DL (ref 0.6–1.1)
EST. AVERAGE GLUCOSE BLD GHB EST-MCNC: 91.1 MG/DL
GFR SERPLBLD CREATININE-BSD FMLA CKD-EPI: 7 ML/MIN/{1.73_M2}
GLUCOSE SERPL-MCNC: 159 MG/DL (ref 70–99)
HBA1C MFR BLD: 4.8 %
HDLC SERPL-MCNC: 32 MG/DL (ref 40–60)
LDL CHOLESTEROL: 67 MG/DL
POTASSIUM SERPL-SCNC: 4.3 MMOL/L (ref 3.5–5.1)
PROT SERPL-MCNC: 6.8 G/DL (ref 6.4–8.2)
SODIUM SERPL-SCNC: 130 MMOL/L (ref 136–145)
TRIGL SERPL-MCNC: 91 MG/DL (ref 0–150)
VLDLC SERPL CALC-MCNC: 18 MG/DL

## 2024-11-05 PROCEDURE — 80053 COMPREHEN METABOLIC PANEL: CPT

## 2024-11-05 PROCEDURE — 80061 LIPID PANEL: CPT

## 2024-11-05 PROCEDURE — 36415 COLL VENOUS BLD VENIPUNCTURE: CPT

## 2024-11-05 PROCEDURE — 97110 THERAPEUTIC EXERCISES: CPT

## 2024-11-05 PROCEDURE — 83036 HEMOGLOBIN GLYCOSYLATED A1C: CPT

## 2024-11-05 NOTE — FLOWSHEET NOTE
[facility] - Outpatient Rehabilitation and Therapy [address], [place] [address] office: [phone] fax: [phone]       Physical Therapy: TREATMENT/PROGRESS NOTE   Patient: Kristine Ramirez (49 y.o. female)   Examination Date: 2024   :  1975 MRN: [id number]   Visit #:   Insurance Allowable Auth Needed   12, then auth required [x]Yes, after  visit    []No    Insurance: Payor: [employer] [place] / Plan: [employer] [place] D[place] / Product Type: *No Product type* /   Insurance ID: [id number] - (Medicare Managed)  Secondary Insurance (if applicable): [employer]*   Treatment Diagnosis:     ICD-10-CM    1. Impaired functional mobility, balance, gait, and endurance  Z74.09       2. Impaired strength of hip muscles  R29.898          Medical Diagnosis:  Physical deconditioning [R53.81]   Referring Physician: Judit Wise MD  PCP: Damon Mireles MD     Plan of care signed (Y/N):     Date of Patient follow up with Physician:      Plan of Care Report: NO  POC update due: (10 visits /OR AUTH LIMITS, whichever is less)  2024                                             Medical History:  Relevant Medical History: ESRD w/HD MWF -32 oz fluid restriction;  CVA , CHF, COPD, HTN, DM type II, Neuropathy, Blind B eyes                                         Precautions/ Contra-indications:           Latex allergy:  NO  Pacemaker:    NO  Contraindications for Manipulation: unhealthy/ multiple comorbidities   Date of Surgery: -  Other:  Allergic to Adhesive Tape    Red Flags:  None    Suicide Screening:   The patient did not verbalize a primary behavioral concern, suicidal ideation, suicidal intent, or demonstrate suicidal behaviors.    Preferred Language for Healthcare:  English    SUBJECTIVE EXAMINATION     Patient stated complaint/comments:  Pt reports she fell twice last week - putting on deodorant and fell backwards and then fell out of bed. Hit her

## 2024-11-06 ENCOUNTER — TELEPHONE (OUTPATIENT)
Dept: FAMILY MEDICINE CLINIC | Age: 49
End: 2024-11-06

## 2024-11-06 NOTE — TELEPHONE ENCOUNTER
I am aware. That's her baseline normal for her when she's not received her dialysis.    Damon Mireles MD  Family Medicine  [facility] Family Medicine [facility]

## 2024-11-12 ENCOUNTER — HOSPITAL ENCOUNTER (OUTPATIENT)
Dept: PHYSICAL THERAPY | Age: 49
Setting detail: THERAPIES SERIES
End: 2024-11-12
Attending: INTERNAL MEDICINE
Payer: COMMERCIAL

## 2024-11-12 RX ORDER — PANTOPRAZOLE SODIUM 40 MG/1
TABLET, DELAYED RELEASE ORAL
Qty: 30 TABLET | Refills: 3 | OUTPATIENT
Start: 2024-11-12

## 2024-11-19 DIAGNOSIS — E11.69 TYPE 2 DIABETES MELLITUS WITH OBESITY (HCC): ICD-10-CM

## 2024-11-19 DIAGNOSIS — E11.22 TYPE 2 DM WITH HYPERTENSION AND ESRD ON DIALYSIS (HCC): ICD-10-CM

## 2024-11-19 DIAGNOSIS — Z99.2 TYPE 2 DM WITH HYPERTENSION AND ESRD ON DIALYSIS (HCC): ICD-10-CM

## 2024-11-19 DIAGNOSIS — I12.0 TYPE 2 DM WITH HYPERTENSION AND ESRD ON DIALYSIS (HCC): ICD-10-CM

## 2024-11-19 DIAGNOSIS — E66.9 TYPE 2 DIABETES MELLITUS WITH OBESITY (HCC): ICD-10-CM

## 2024-11-19 DIAGNOSIS — N18.6 TYPE 2 DM WITH HYPERTENSION AND ESRD ON DIALYSIS (HCC): ICD-10-CM

## 2024-11-20 RX ORDER — DULAGLUTIDE 3 MG/.5ML
INJECTION, SOLUTION SUBCUTANEOUS
Qty: 2 ML | Refills: 5 | Status: SHIPPED | OUTPATIENT
Start: 2024-11-20

## 2024-11-20 NOTE — TELEPHONE ENCOUNTER
LRX: 6/7/24  LOV: 10/31/2024   NOV: Visit date not found- Return noted for 3mo (around 1/31/25)  Last lab: 11/5/24

## 2024-11-26 ENCOUNTER — APPOINTMENT (OUTPATIENT)
Dept: PHYSICAL THERAPY | Age: 49
End: 2024-11-26
Attending: INTERNAL MEDICINE
Payer: COMMERCIAL

## 2024-11-27 ENCOUNTER — CARE COORDINATION (OUTPATIENT)
Dept: CARE COORDINATION | Age: 49
End: 2024-11-27

## 2024-11-27 ENCOUNTER — TELEPHONE (OUTPATIENT)
Dept: FAMILY MEDICINE CLINIC | Age: 49
End: 2024-11-27

## 2024-11-27 DIAGNOSIS — F19.20 CONTROLLED DRUG DEPENDENCE (HCC): ICD-10-CM

## 2024-11-27 DIAGNOSIS — F41.0 GENERALIZED ANXIETY DISORDER WITH PANIC ATTACKS: ICD-10-CM

## 2024-11-27 DIAGNOSIS — Z02.89 MEDICATION MANAGEMENT CONTRACT AGREEMENT: ICD-10-CM

## 2024-11-27 DIAGNOSIS — F13.20 BENZODIAZEPINE DEPENDENCE, CONTINUOUS (HCC): ICD-10-CM

## 2024-11-27 DIAGNOSIS — F41.1 GENERALIZED ANXIETY DISORDER WITH PANIC ATTACKS: ICD-10-CM

## 2024-11-27 RX ORDER — ALPRAZOLAM 2 MG/1
2 TABLET, EXTENDED RELEASE ORAL EVERY MORNING
Qty: 90 TABLET | Refills: 0 | Status: SHIPPED | OUTPATIENT
Start: 2024-11-27 | End: 2025-02-25

## 2024-11-27 NOTE — TELEPHONE ENCOUNTER
PDMP monitoring:  -No significant or noteworthy irregularities noted.   -Last report:   Last PDMP [place] as Reviewed ([place]):  Review User Review Instant Review Result   DAMON JAIMES 11/27/2024  1:36 PM Reviewed PDMP [1]     Rx sent.    Damon Jaimes MD  Family Medicine  [facility] Family Medicine [facility]

## 2024-11-27 NOTE — TELEPHONE ENCOUNTER
Pt requesting medication refill.   Disp Refills Start End    ALPRAZolam (ALPRAZOLAM XR) 2 MG extended release tablet 90 tablet 0 8/30/2024 11/28/2024    Sig - Route: Take 1 tablet by mouth every morning for 90 days. Max Daily Amount: 2 mg - Oral       Disp Refills Start End    nebivolol (BYSTOLIC) 5 MG tablet 30 tablet 2 2/1/2024 --    Sig - Route: Take 1 tablet by mouth daily - Oral    [facility] [address] shari Luis 10/31/2024  Nov 1/30/2025

## 2024-12-04 RX ORDER — SODIUM CHLORIDE 9 MG/ML
INJECTION, SOLUTION INTRAVENOUS CONTINUOUS
Status: CANCELLED | OUTPATIENT
Start: 2024-12-04

## 2024-12-05 ENCOUNTER — APPOINTMENT (OUTPATIENT)
Dept: CT IMAGING | Age: 49
DRG: 917 | End: 2024-12-05
Attending: INTERNAL MEDICINE
Payer: COMMERCIAL

## 2024-12-05 ENCOUNTER — ANESTHESIA (OUTPATIENT)
Dept: ENDOSCOPY | Age: 49
End: 2024-12-05
Payer: COMMERCIAL

## 2024-12-05 ENCOUNTER — HOSPITAL ENCOUNTER (INPATIENT)
Age: 49
LOS: 2 days | Discharge: LEFT BEFORE DISCHARGE | DRG: 917 | End: 2024-12-07
Attending: INTERNAL MEDICINE | Admitting: INTERNAL MEDICINE
Payer: COMMERCIAL

## 2024-12-05 ENCOUNTER — HOSPITAL ENCOUNTER (OUTPATIENT)
Age: 49
Discharge: STILL ADMITTED OR ADMINISTRATIVE | DRG: 917 | End: 2024-12-05
Attending: INTERNAL MEDICINE | Admitting: HOSPITALIST
Payer: COMMERCIAL

## 2024-12-05 ENCOUNTER — APPOINTMENT (OUTPATIENT)
Dept: ENDOSCOPY | Age: 49
DRG: 917 | End: 2024-12-05
Attending: INTERNAL MEDICINE
Payer: COMMERCIAL

## 2024-12-05 ENCOUNTER — ANESTHESIA EVENT (OUTPATIENT)
Dept: ENDOSCOPY | Age: 49
End: 2024-12-05
Payer: COMMERCIAL

## 2024-12-05 ENCOUNTER — APPOINTMENT (OUTPATIENT)
Dept: MRI IMAGING | Age: 49
DRG: 917 | End: 2024-12-05
Attending: INTERNAL MEDICINE
Payer: COMMERCIAL

## 2024-12-05 VITALS
HEIGHT: 67 IN | RESPIRATION RATE: 21 BRPM | HEART RATE: 96 BPM | BODY MASS INDEX: 22.49 KG/M2 | TEMPERATURE: 97.8 F | SYSTOLIC BLOOD PRESSURE: 179 MMHG | OXYGEN SATURATION: 99 % | WEIGHT: 143.3 LBS | DIASTOLIC BLOOD PRESSURE: 93 MMHG

## 2024-12-05 DIAGNOSIS — K25.9 GASTRIC ULCER, UNSPECIFIED CHRONICITY, UNSPECIFIED WHETHER GASTRIC ULCER HEMORRHAGE OR PERFORATION PRESENT: ICD-10-CM

## 2024-12-05 DIAGNOSIS — I63.9 CEREBROVASCULAR ACCIDENT (CVA), UNSPECIFIED MECHANISM (HCC): Primary | ICD-10-CM

## 2024-12-05 DIAGNOSIS — I63.211 CEREBROVASCULAR ACCIDENT (CVA) DUE TO OCCLUSION OF RIGHT VERTEBRAL ARTERY (HCC): ICD-10-CM

## 2024-12-05 PROBLEM — R41.82 AMS (ALTERED MENTAL STATUS): Status: ACTIVE | Noted: 2024-12-05

## 2024-12-05 PROBLEM — J96.01 ACUTE RESPIRATORY FAILURE WITH HYPOXIA: Status: ACTIVE | Noted: 2024-12-05

## 2024-12-05 LAB
ALBUMIN SERPL-MCNC: 3.6 G/DL (ref 3.4–5)
ALBUMIN SERPL-MCNC: 3.6 G/DL (ref 3.4–5)
ALBUMIN/GLOB SERPL: 1.1 {RATIO} (ref 1.1–2.2)
ALBUMIN/GLOB SERPL: 1.1 {RATIO} (ref 1.1–2.2)
ALP SERPL-CCNC: 365 U/L (ref 40–129)
ALP SERPL-CCNC: 375 U/L (ref 40–129)
ALT SERPL-CCNC: 32 U/L (ref 10–40)
ALT SERPL-CCNC: 34 U/L (ref 10–40)
ANION GAP SERPL CALCULATED.3IONS-SCNC: 17 MMOL/L (ref 3–16)
ANION GAP SERPL CALCULATED.3IONS-SCNC: 18 MMOL/L (ref 3–16)
AST SERPL-CCNC: 38 U/L (ref 15–37)
AST SERPL-CCNC: 47 U/L (ref 15–37)
BASE EXCESS BLDA CALC-SCNC: 1 MMOL/L (ref -3–3)
BASOPHILS # BLD: 0 K/UL (ref 0–0.2)
BASOPHILS # BLD: 0 K/UL (ref 0–0.2)
BASOPHILS NFR BLD: 0.4 %
BASOPHILS NFR BLD: 0.8 %
BILIRUB SERPL-MCNC: 0.6 MG/DL (ref 0–1)
BILIRUB SERPL-MCNC: 0.7 MG/DL (ref 0–1)
BUN SERPL-MCNC: 52 MG/DL (ref 7–20)
BUN SERPL-MCNC: 54 MG/DL (ref 7–20)
CA-I BLD-SCNC: 0.98 MMOL/L (ref 1.12–1.32)
CALCIUM SERPL-MCNC: 8 MG/DL (ref 8.3–10.6)
CALCIUM SERPL-MCNC: 8.1 MG/DL (ref 8.3–10.6)
CHLORIDE SERPL-SCNC: 94 MMOL/L (ref 99–110)
CHLORIDE SERPL-SCNC: 95 MMOL/L (ref 99–110)
CO2 BLDA-SCNC: 29 MMOL/L
CO2 SERPL-SCNC: 25 MMOL/L (ref 21–32)
CO2 SERPL-SCNC: 26 MMOL/L (ref 21–32)
CREAT SERPL-MCNC: 4.7 MG/DL (ref 0.6–1.1)
CREAT SERPL-MCNC: 4.8 MG/DL (ref 0.6–1.1)
DEPRECATED RDW RBC AUTO: 17.7 % (ref 12.4–15.4)
DEPRECATED RDW RBC AUTO: 18 % (ref 12.4–15.4)
EOSINOPHIL # BLD: 0 K/UL (ref 0–0.6)
EOSINOPHIL # BLD: 0.1 K/UL (ref 0–0.6)
EOSINOPHIL NFR BLD: 0.7 %
EOSINOPHIL NFR BLD: 1.4 %
GFR SERPLBLD CREATININE-BSD FMLA CKD-EPI: 10 ML/MIN/{1.73_M2}
GFR SERPLBLD CREATININE-BSD FMLA CKD-EPI: 11 ML/MIN/{1.73_M2}
GLUCOSE BLD-MCNC: 150 MG/DL (ref 70–99)
GLUCOSE BLD-MCNC: 154 MG/DL (ref 70–99)
GLUCOSE BLD-MCNC: 175 MG/DL (ref 70–99)
GLUCOSE BLD-MCNC: 249 MG/DL (ref 70–99)
GLUCOSE SERPL-MCNC: 156 MG/DL (ref 70–99)
GLUCOSE SERPL-MCNC: 169 MG/DL (ref 70–99)
HCO3 BLDA-SCNC: 27.1 MMOL/L (ref 21–29)
HCT VFR BLD AUTO: 38.1 % (ref 36–48)
HCT VFR BLD AUTO: 38.1 % (ref 36–48)
HGB BLD-MCNC: 12 G/DL (ref 12–16)
HGB BLD-MCNC: 12.2 G/DL (ref 12–16)
INR PPP: 1.14 (ref 0.85–1.15)
LACTATE BLD-SCNC: 1.94 MMOL/L (ref 0.4–2)
LYMPHOCYTES # BLD: 0.6 K/UL (ref 1–5.1)
LYMPHOCYTES # BLD: 0.9 K/UL (ref 1–5.1)
LYMPHOCYTES NFR BLD: 11.6 %
LYMPHOCYTES NFR BLD: 15.9 %
MAGNESIUM SERPL-MCNC: 1.95 MG/DL (ref 1.8–2.4)
MCH RBC QN AUTO: 29.7 PG (ref 26–34)
MCH RBC QN AUTO: 30 PG (ref 26–34)
MCHC RBC AUTO-ENTMCNC: 31.5 G/DL (ref 31–36)
MCHC RBC AUTO-ENTMCNC: 32.1 G/DL (ref 31–36)
MCV RBC AUTO: 93.7 FL (ref 80–100)
MCV RBC AUTO: 94.3 FL (ref 80–100)
MONOCYTES # BLD: 0.4 K/UL (ref 0–1.3)
MONOCYTES # BLD: 0.4 K/UL (ref 0–1.3)
MONOCYTES NFR BLD: 7.7 %
MONOCYTES NFR BLD: 8.1 %
NEUTROPHILS # BLD: 4 K/UL (ref 1.7–7.7)
NEUTROPHILS # BLD: 4.2 K/UL (ref 1.7–7.7)
NEUTROPHILS NFR BLD: 73.8 %
NEUTROPHILS NFR BLD: 79.6 %
PCO2 BLDA: 51.6 MM HG (ref 35–45)
PERFORMED ON: ABNORMAL
PH BLDA: 7.33 [PH] (ref 7.35–7.45)
PLATELET # BLD AUTO: 180 K/UL (ref 135–450)
PLATELET # BLD AUTO: 189 K/UL (ref 135–450)
PMV BLD AUTO: 8.9 FL (ref 5–10.5)
PMV BLD AUTO: 9.1 FL (ref 5–10.5)
PO2 BLDA: 372.4 MM HG (ref 75–108)
POC SAMPLE TYPE: ABNORMAL
POTASSIUM BLD-SCNC: 5.2 MMOL/L (ref 3.5–5.1)
POTASSIUM SERPL-SCNC: 5.6 MMOL/L (ref 3.5–5.1)
POTASSIUM SERPL-SCNC: 5.8 MMOL/L (ref 3.5–5.1)
PROT SERPL-MCNC: 6.8 G/DL (ref 6.4–8.2)
PROT SERPL-MCNC: 6.8 G/DL (ref 6.4–8.2)
PROTHROMBIN TIME: 14.9 SEC (ref 11.9–14.9)
RBC # BLD AUTO: 4.04 M/UL (ref 4–5.2)
RBC # BLD AUTO: 4.07 M/UL (ref 4–5.2)
SAO2 % BLDA: 100 % (ref 93–100)
SODIUM BLD-SCNC: 133 MMOL/L (ref 136–145)
SODIUM SERPL-SCNC: 137 MMOL/L (ref 136–145)
SODIUM SERPL-SCNC: 138 MMOL/L (ref 136–145)
TROPONIN, HIGH SENSITIVITY: 254 NG/L (ref 0–14)
WBC # BLD AUTO: 5.3 K/UL (ref 4–11)
WBC # BLD AUTO: 5.5 K/UL (ref 4–11)

## 2024-12-05 PROCEDURE — 2700000000 HC OXYGEN THERAPY PER DAY

## 2024-12-05 PROCEDURE — 6360000004 HC RX CONTRAST MEDICATION: Performed by: INTERNAL MEDICINE

## 2024-12-05 PROCEDURE — 99291 CRITICAL CARE FIRST HOUR: CPT | Performed by: INTERNAL MEDICINE

## 2024-12-05 PROCEDURE — 3609012400 HC EGD TRANSORAL BIOPSY SINGLE/MULTIPLE: Performed by: INTERNAL MEDICINE

## 2024-12-05 PROCEDURE — 83735 ASSAY OF MAGNESIUM: CPT

## 2024-12-05 PROCEDURE — 84295 ASSAY OF SERUM SODIUM: CPT

## 2024-12-05 PROCEDURE — 85025 COMPLETE CBC W/AUTO DIFF WBC: CPT

## 2024-12-05 PROCEDURE — 36415 COLL VENOUS BLD VENIPUNCTURE: CPT

## 2024-12-05 PROCEDURE — 0DB78ZX EXCISION OF STOMACH, PYLORUS, VIA NATURAL OR ARTIFICIAL OPENING ENDOSCOPIC, DIAGNOSTIC: ICD-10-PCS | Performed by: INTERNAL MEDICINE

## 2024-12-05 PROCEDURE — 84132 ASSAY OF SERUM POTASSIUM: CPT

## 2024-12-05 PROCEDURE — 2000000000 HC ICU R&B

## 2024-12-05 PROCEDURE — 5A09357 ASSISTANCE WITH RESPIRATORY VENTILATION, LESS THAN 24 CONSECUTIVE HOURS, CONTINUOUS POSITIVE AIRWAY PRESSURE: ICD-10-PCS | Performed by: INTERNAL MEDICINE

## 2024-12-05 PROCEDURE — 6370000000 HC RX 637 (ALT 250 FOR IP)

## 2024-12-05 PROCEDURE — 85610 PROTHROMBIN TIME: CPT

## 2024-12-05 PROCEDURE — 2500000003 HC RX 250 WO HCPCS

## 2024-12-05 PROCEDURE — 84484 ASSAY OF TROPONIN QUANT: CPT

## 2024-12-05 PROCEDURE — 70551 MRI BRAIN STEM W/O DYE: CPT

## 2024-12-05 PROCEDURE — 80053 COMPREHEN METABOLIC PANEL: CPT

## 2024-12-05 PROCEDURE — 6360000002 HC RX W HCPCS

## 2024-12-05 PROCEDURE — 82330 ASSAY OF CALCIUM: CPT

## 2024-12-05 PROCEDURE — 70498 CT ANGIOGRAPHY NECK: CPT

## 2024-12-05 PROCEDURE — 99291 CRITICAL CARE FIRST HOUR: CPT | Performed by: STUDENT IN AN ORGANIZED HEALTH CARE EDUCATION/TRAINING PROGRAM

## 2024-12-05 PROCEDURE — 94761 N-INVAS EAR/PLS OXIMETRY MLT: CPT

## 2024-12-05 PROCEDURE — 70450 CT HEAD/BRAIN W/O DYE: CPT

## 2024-12-05 PROCEDURE — 2580000003 HC RX 258

## 2024-12-05 PROCEDURE — 2709999900 HC NON-CHARGEABLE SUPPLY: Performed by: INTERNAL MEDICINE

## 2024-12-05 PROCEDURE — 1200000000 HC SEMI PRIVATE

## 2024-12-05 PROCEDURE — 31500 INSERT EMERGENCY AIRWAY: CPT

## 2024-12-05 PROCEDURE — 0BH17EZ INSERTION OF ENDOTRACHEAL AIRWAY INTO TRACHEA, VIA NATURAL OR ARTIFICIAL OPENING: ICD-10-PCS | Performed by: INTERNAL MEDICINE

## 2024-12-05 PROCEDURE — 94002 VENT MGMT INPAT INIT DAY: CPT

## 2024-12-05 PROCEDURE — 3700000001 HC ADD 15 MINUTES (ANESTHESIA): Performed by: INTERNAL MEDICINE

## 2024-12-05 PROCEDURE — 82803 BLOOD GASES ANY COMBINATION: CPT

## 2024-12-05 PROCEDURE — 4A03X5D MEASUREMENT OF ARTERIAL FLOW, INTRACRANIAL, EXTERNAL APPROACH: ICD-10-PCS | Performed by: RADIOLOGY

## 2024-12-05 PROCEDURE — 5A1935Z RESPIRATORY VENTILATION, LESS THAN 24 CONSECUTIVE HOURS: ICD-10-PCS | Performed by: INTERNAL MEDICINE

## 2024-12-05 PROCEDURE — 3700000000 HC ANESTHESIA ATTENDED CARE: Performed by: INTERNAL MEDICINE

## 2024-12-05 PROCEDURE — 83605 ASSAY OF LACTIC ACID: CPT

## 2024-12-05 PROCEDURE — 88305 TISSUE EXAM BY PATHOLOGIST: CPT

## 2024-12-05 PROCEDURE — 82947 ASSAY GLUCOSE BLOOD QUANT: CPT

## 2024-12-05 RX ORDER — ACETAMINOPHEN 650 MG/1
650 SUPPOSITORY RECTAL EVERY 6 HOURS PRN
Status: DISCONTINUED | OUTPATIENT
Start: 2024-12-05 | End: 2024-12-05 | Stop reason: HOSPADM

## 2024-12-05 RX ORDER — ATORVASTATIN CALCIUM 80 MG/1
80 TABLET, FILM COATED ORAL NIGHTLY
Status: CANCELLED | OUTPATIENT
Start: 2024-12-05

## 2024-12-05 RX ORDER — ACETAMINOPHEN 650 MG/1
650 SUPPOSITORY RECTAL EVERY 6 HOURS PRN
Status: DISCONTINUED | OUTPATIENT
Start: 2024-12-05 | End: 2024-12-07 | Stop reason: HOSPADM

## 2024-12-05 RX ORDER — ATORVASTATIN CALCIUM 80 MG/1
80 TABLET, FILM COATED ORAL NIGHTLY
Status: DISCONTINUED | OUTPATIENT
Start: 2024-12-05 | End: 2024-12-06

## 2024-12-05 RX ORDER — GLYCOPYRROLATE 0.2 MG/ML
INJECTION INTRAMUSCULAR; INTRAVENOUS
Status: DISCONTINUED | OUTPATIENT
Start: 2024-12-05 | End: 2024-12-05 | Stop reason: SDUPTHER

## 2024-12-05 RX ORDER — GLUCAGON 1 MG/ML
1 KIT INJECTION PRN
Status: DISCONTINUED | OUTPATIENT
Start: 2024-12-05 | End: 2024-12-07 | Stop reason: HOSPADM

## 2024-12-05 RX ORDER — ACETAMINOPHEN 325 MG/1
650 TABLET ORAL EVERY 6 HOURS PRN
Status: DISCONTINUED | OUTPATIENT
Start: 2024-12-05 | End: 2024-12-05 | Stop reason: HOSPADM

## 2024-12-05 RX ORDER — IOPAMIDOL 755 MG/ML
75 INJECTION, SOLUTION INTRAVASCULAR
Status: COMPLETED | OUTPATIENT
Start: 2024-12-05 | End: 2024-12-05

## 2024-12-05 RX ORDER — BUSPIRONE HYDROCHLORIDE 5 MG/1
10 TABLET ORAL 2 TIMES DAILY
Status: DISCONTINUED | OUTPATIENT
Start: 2024-12-05 | End: 2024-12-07 | Stop reason: HOSPADM

## 2024-12-05 RX ORDER — SODIUM CHLORIDE 9 MG/ML
INJECTION, SOLUTION INTRAVENOUS CONTINUOUS
Status: DISCONTINUED | OUTPATIENT
Start: 2024-12-05 | End: 2024-12-07

## 2024-12-05 RX ORDER — ONDANSETRON 4 MG/1
4 TABLET, ORALLY DISINTEGRATING ORAL EVERY 8 HOURS PRN
Status: DISCONTINUED | OUTPATIENT
Start: 2024-12-05 | End: 2024-12-07 | Stop reason: HOSPADM

## 2024-12-05 RX ORDER — ASPIRIN 81 MG/1
81 TABLET, CHEWABLE ORAL DAILY
Status: CANCELLED | OUTPATIENT
Start: 2024-12-05

## 2024-12-05 RX ORDER — SODIUM CHLORIDE 9 MG/ML
INJECTION, SOLUTION INTRAVENOUS PRN
Status: CANCELLED | OUTPATIENT
Start: 2024-12-05

## 2024-12-05 RX ORDER — ASPIRIN 81 MG/1
81 TABLET, CHEWABLE ORAL DAILY
Status: DISCONTINUED | OUTPATIENT
Start: 2024-12-05 | End: 2024-12-07 | Stop reason: HOSPADM

## 2024-12-05 RX ORDER — ROCURONIUM BROMIDE 10 MG/ML
INJECTION, SOLUTION INTRAVENOUS
Status: DISCONTINUED | OUTPATIENT
Start: 2024-12-05 | End: 2024-12-05 | Stop reason: SDUPTHER

## 2024-12-05 RX ORDER — ALBUTEROL SULFATE 0.83 MG/ML
2.5 SOLUTION RESPIRATORY (INHALATION) EVERY 6 HOURS PRN
Status: DISCONTINUED | OUTPATIENT
Start: 2024-12-05 | End: 2024-12-07 | Stop reason: HOSPADM

## 2024-12-05 RX ORDER — SODIUM CHLORIDE 0.9 % (FLUSH) 0.9 %
5-40 SYRINGE (ML) INJECTION EVERY 12 HOURS SCHEDULED
Status: DISCONTINUED | OUTPATIENT
Start: 2024-12-05 | End: 2024-12-07 | Stop reason: HOSPADM

## 2024-12-05 RX ORDER — PANTOPRAZOLE SODIUM 40 MG/1
40 TABLET, DELAYED RELEASE ORAL
Status: DISCONTINUED | OUTPATIENT
Start: 2024-12-06 | End: 2024-12-06

## 2024-12-05 RX ORDER — POLYETHYLENE GLYCOL 3350 17 G/17G
17 POWDER, FOR SOLUTION ORAL DAILY PRN
Status: DISCONTINUED | OUTPATIENT
Start: 2024-12-05 | End: 2024-12-05 | Stop reason: HOSPADM

## 2024-12-05 RX ORDER — GLUCAGON 1 MG/ML
1 KIT INJECTION PRN
Status: DISCONTINUED | OUTPATIENT
Start: 2024-12-05 | End: 2024-12-05 | Stop reason: HOSPADM

## 2024-12-05 RX ORDER — ONDANSETRON 4 MG/1
4 TABLET, ORALLY DISINTEGRATING ORAL EVERY 8 HOURS PRN
Status: CANCELLED | OUTPATIENT
Start: 2024-12-05

## 2024-12-05 RX ORDER — ASPIRIN 300 MG/1
300 SUPPOSITORY RECTAL DAILY
Status: CANCELLED | OUTPATIENT
Start: 2024-12-05

## 2024-12-05 RX ORDER — SODIUM CHLORIDE 0.9 % (FLUSH) 0.9 %
5-40 SYRINGE (ML) INJECTION PRN
Status: DISCONTINUED | OUTPATIENT
Start: 2024-12-05 | End: 2024-12-07 | Stop reason: HOSPADM

## 2024-12-05 RX ORDER — POLYETHYLENE GLYCOL 3350 17 G/17G
17 POWDER, FOR SOLUTION ORAL DAILY PRN
Status: DISCONTINUED | OUTPATIENT
Start: 2024-12-05 | End: 2024-12-05 | Stop reason: SDUPTHER

## 2024-12-05 RX ORDER — ONDANSETRON 2 MG/ML
4 INJECTION INTRAMUSCULAR; INTRAVENOUS EVERY 6 HOURS PRN
Status: DISCONTINUED | OUTPATIENT
Start: 2024-12-05 | End: 2024-12-05 | Stop reason: HOSPADM

## 2024-12-05 RX ORDER — ONDANSETRON 2 MG/ML
4 INJECTION INTRAMUSCULAR; INTRAVENOUS EVERY 6 HOURS PRN
Status: DISCONTINUED | OUTPATIENT
Start: 2024-12-05 | End: 2024-12-05

## 2024-12-05 RX ORDER — ESMOLOL HYDROCHLORIDE 10 MG/ML
INJECTION INTRAVENOUS
Status: DISCONTINUED | OUTPATIENT
Start: 2024-12-05 | End: 2024-12-05 | Stop reason: SDUPTHER

## 2024-12-05 RX ORDER — SODIUM CHLORIDE 9 MG/ML
INJECTION, SOLUTION INTRAVENOUS PRN
Status: DISCONTINUED | OUTPATIENT
Start: 2024-12-05 | End: 2024-12-07 | Stop reason: HOSPADM

## 2024-12-05 RX ORDER — CYCLOBENZAPRINE HCL 10 MG
5 TABLET ORAL DAILY PRN
Status: DISCONTINUED | OUTPATIENT
Start: 2024-12-05 | End: 2024-12-07 | Stop reason: HOSPADM

## 2024-12-05 RX ORDER — SODIUM CHLORIDE 0.9 % (FLUSH) 0.9 %
5-40 SYRINGE (ML) INJECTION PRN
Status: DISCONTINUED | OUTPATIENT
Start: 2024-12-05 | End: 2024-12-05 | Stop reason: HOSPADM

## 2024-12-05 RX ORDER — SODIUM CHLORIDE 9 MG/ML
INJECTION, SOLUTION INTRAMUSCULAR; INTRAVENOUS; SUBCUTANEOUS
Status: DISCONTINUED | OUTPATIENT
Start: 2024-12-05 | End: 2024-12-05 | Stop reason: SDUPTHER

## 2024-12-05 RX ORDER — ONDANSETRON 4 MG/1
4 TABLET, ORALLY DISINTEGRATING ORAL EVERY 8 HOURS PRN
Status: DISCONTINUED | OUTPATIENT
Start: 2024-12-05 | End: 2024-12-05

## 2024-12-05 RX ORDER — ACETAMINOPHEN 325 MG/1
650 TABLET ORAL EVERY 6 HOURS PRN
Status: DISCONTINUED | OUTPATIENT
Start: 2024-12-05 | End: 2024-12-07 | Stop reason: HOSPADM

## 2024-12-05 RX ORDER — VENLAFAXINE HYDROCHLORIDE 37.5 MG/1
37.5 CAPSULE, EXTENDED RELEASE ORAL
Status: DISCONTINUED | OUTPATIENT
Start: 2024-12-06 | End: 2024-12-07 | Stop reason: HOSPADM

## 2024-12-05 RX ORDER — SODIUM CHLORIDE 9 MG/ML
INJECTION, SOLUTION INTRAVENOUS PRN
Status: DISCONTINUED | OUTPATIENT
Start: 2024-12-05 | End: 2024-12-05 | Stop reason: HOSPADM

## 2024-12-05 RX ORDER — INSULIN LISPRO 100 [IU]/ML
0-4 INJECTION, SOLUTION INTRAVENOUS; SUBCUTANEOUS EVERY 6 HOURS
Status: DISCONTINUED | OUTPATIENT
Start: 2024-12-05 | End: 2024-12-05 | Stop reason: HOSPADM

## 2024-12-05 RX ORDER — DEXTROSE MONOHYDRATE 100 MG/ML
INJECTION, SOLUTION INTRAVENOUS CONTINUOUS PRN
Status: DISCONTINUED | OUTPATIENT
Start: 2024-12-05 | End: 2024-12-07 | Stop reason: HOSPADM

## 2024-12-05 RX ORDER — DEXTROSE MONOHYDRATE 100 MG/ML
INJECTION, SOLUTION INTRAVENOUS CONTINUOUS PRN
Status: DISCONTINUED | OUTPATIENT
Start: 2024-12-05 | End: 2024-12-05 | Stop reason: HOSPADM

## 2024-12-05 RX ORDER — INSULIN LISPRO 100 [IU]/ML
0-4 INJECTION, SOLUTION INTRAVENOUS; SUBCUTANEOUS
Status: DISCONTINUED | OUTPATIENT
Start: 2024-12-05 | End: 2024-12-06

## 2024-12-05 RX ORDER — POLYETHYLENE GLYCOL 3350 17 G/17G
17 POWDER, FOR SOLUTION ORAL DAILY PRN
Status: CANCELLED | OUTPATIENT
Start: 2024-12-05

## 2024-12-05 RX ORDER — SODIUM CHLORIDE 0.9 % (FLUSH) 0.9 %
5-40 SYRINGE (ML) INJECTION PRN
Status: CANCELLED | OUTPATIENT
Start: 2024-12-05

## 2024-12-05 RX ORDER — ONDANSETRON 4 MG/1
4 TABLET, ORALLY DISINTEGRATING ORAL EVERY 8 HOURS PRN
Status: DISCONTINUED | OUTPATIENT
Start: 2024-12-05 | End: 2024-12-05 | Stop reason: HOSPADM

## 2024-12-05 RX ORDER — SODIUM CHLORIDE 0.9 % (FLUSH) 0.9 %
5-40 SYRINGE (ML) INJECTION EVERY 12 HOURS SCHEDULED
Status: CANCELLED | OUTPATIENT
Start: 2024-12-05

## 2024-12-05 RX ORDER — POLYETHYLENE GLYCOL 3350 17 G/17G
17 POWDER, FOR SOLUTION ORAL DAILY PRN
Status: DISCONTINUED | OUTPATIENT
Start: 2024-12-05 | End: 2024-12-07 | Stop reason: HOSPADM

## 2024-12-05 RX ORDER — PROPOFOL 10 MG/ML
INJECTION, EMULSION INTRAVENOUS
Status: DISCONTINUED | OUTPATIENT
Start: 2024-12-05 | End: 2024-12-05 | Stop reason: SDUPTHER

## 2024-12-05 RX ORDER — LABETALOL HYDROCHLORIDE 5 MG/ML
10 INJECTION, SOLUTION INTRAVENOUS EVERY 10 MIN PRN
Status: DISCONTINUED | OUTPATIENT
Start: 2024-12-05 | End: 2024-12-07 | Stop reason: HOSPADM

## 2024-12-05 RX ORDER — AMLODIPINE BESYLATE 5 MG/1
5 TABLET ORAL DAILY
Status: ON HOLD | COMMUNITY
End: 2024-12-06 | Stop reason: ALTCHOICE

## 2024-12-05 RX ORDER — ALPRAZOLAM 0.25 MG/1
0.25 TABLET ORAL NIGHTLY PRN
Status: DISCONTINUED | OUTPATIENT
Start: 2024-12-05 | End: 2024-12-06

## 2024-12-05 RX ORDER — LIDOCAINE HYDROCHLORIDE 20 MG/ML
INJECTION, SOLUTION INFILTRATION; PERINEURAL
Status: DISCONTINUED | OUTPATIENT
Start: 2024-12-05 | End: 2024-12-05 | Stop reason: SDUPTHER

## 2024-12-05 RX ORDER — HEPARIN SODIUM 5000 [USP'U]/ML
5000 INJECTION, SOLUTION INTRAVENOUS; SUBCUTANEOUS EVERY 8 HOURS SCHEDULED
Status: DISCONTINUED | OUTPATIENT
Start: 2024-12-05 | End: 2024-12-05 | Stop reason: HOSPADM

## 2024-12-05 RX ORDER — PROPOFOL 10 MG/ML
5-50 INJECTION, EMULSION INTRAVENOUS CONTINUOUS
Status: DISCONTINUED | OUTPATIENT
Start: 2024-12-05 | End: 2024-12-05

## 2024-12-05 RX ORDER — ONDANSETRON 2 MG/ML
4 INJECTION INTRAMUSCULAR; INTRAVENOUS EVERY 6 HOURS PRN
Status: DISCONTINUED | OUTPATIENT
Start: 2024-12-05 | End: 2024-12-07 | Stop reason: HOSPADM

## 2024-12-05 RX ORDER — ASPIRIN 300 MG/1
300 SUPPOSITORY RECTAL DAILY
Status: DISCONTINUED | OUTPATIENT
Start: 2024-12-05 | End: 2024-12-07 | Stop reason: HOSPADM

## 2024-12-05 RX ORDER — PANTOPRAZOLE SODIUM 40 MG/10ML
40 INJECTION, POWDER, LYOPHILIZED, FOR SOLUTION INTRAVENOUS DAILY
Status: DISCONTINUED | OUTPATIENT
Start: 2024-12-05 | End: 2024-12-05 | Stop reason: HOSPADM

## 2024-12-05 RX ORDER — ONDANSETRON 2 MG/ML
4 INJECTION INTRAMUSCULAR; INTRAVENOUS EVERY 6 HOURS PRN
Status: CANCELLED | OUTPATIENT
Start: 2024-12-05

## 2024-12-05 RX ORDER — SODIUM CHLORIDE 0.9 % (FLUSH) 0.9 %
5-40 SYRINGE (ML) INJECTION EVERY 12 HOURS SCHEDULED
Status: DISCONTINUED | OUTPATIENT
Start: 2024-12-05 | End: 2024-12-05 | Stop reason: HOSPADM

## 2024-12-05 RX ADMIN — ASPIRIN 81 MG: 81 TABLET, CHEWABLE ORAL at 22:13

## 2024-12-05 RX ADMIN — PROPOFOL 30 MG: 10 INJECTION, EMULSION INTRAVENOUS at 14:57

## 2024-12-05 RX ADMIN — SODIUM CHLORIDE, PRESERVATIVE FREE 10 ML: 5 INJECTION INTRAVENOUS at 22:14

## 2024-12-05 RX ADMIN — SODIUM CHLORIDE 10 ML: 9 INJECTION, SOLUTION INTRAMUSCULAR; INTRAVENOUS; SUBCUTANEOUS at 14:57

## 2024-12-05 RX ADMIN — IOPAMIDOL 75 ML: 755 INJECTION, SOLUTION INTRAVENOUS at 15:59

## 2024-12-05 RX ADMIN — SUGAMMADEX 200 MG: 100 INJECTION, SOLUTION INTRAVENOUS at 16:09

## 2024-12-05 RX ADMIN — ESMOLOL HYDROCHLORIDE 50 MG: 10 INJECTION, SOLUTION INTRAVENOUS at 15:17

## 2024-12-05 RX ADMIN — BUSPIRONE HYDROCHLORIDE 10 MG: 5 TABLET ORAL at 23:31

## 2024-12-05 RX ADMIN — LIDOCAINE HYDROCHLORIDE 80 MG: 20 INJECTION, SOLUTION INFILTRATION; PERINEURAL at 14:57

## 2024-12-05 RX ADMIN — PROPOFOL 30 MG: 10 INJECTION, EMULSION INTRAVENOUS at 14:59

## 2024-12-05 RX ADMIN — GLYCOPYRROLATE 0.2 MG: 0.2 INJECTION INTRAMUSCULAR; INTRAVENOUS at 14:57

## 2024-12-05 RX ADMIN — PHENYLEPHRINE HYDROCHLORIDE 100 MCG: 10 INJECTION, SOLUTION INTRAMUSCULAR; INTRAVENOUS; SUBCUTANEOUS at 15:04

## 2024-12-05 RX ADMIN — SODIUM CHLORIDE, PRESERVATIVE FREE 10 ML: 5 INJECTION INTRAVENOUS at 22:13

## 2024-12-05 RX ADMIN — PROPOFOL 25 MCG/KG/MIN: 10 INJECTION, EMULSION INTRAVENOUS at 15:35

## 2024-12-05 RX ADMIN — ROCURONIUM BROMIDE 50 MG: 10 INJECTION, SOLUTION INTRAVENOUS at 15:38

## 2024-12-05 ASSESSMENT — PAIN - FUNCTIONAL ASSESSMENT: PAIN_FUNCTIONAL_ASSESSMENT: 0-10

## 2024-12-05 ASSESSMENT — PULMONARY FUNCTION TESTS
PIF_VALUE: 13
PIF_VALUE: 14
PIF_VALUE: 13

## 2024-12-05 ASSESSMENT — PAIN SCALES - GENERAL: PAINLEVEL_OUTOF10: 0

## 2024-12-05 NOTE — H&P
Prior to admission:  From home   Current:  ICU  At discharge:  CHIDI Daly MD, PGY-1  Internal Medicine Resident  Contact via [facility]     Pulmonary & Critical Care    Patient seen and examined. I agree with Dr. Daly's history, physical, lab findings, assessment and plan.     Marck Ruiz MD

## 2024-12-05 NOTE — CONSULTS
Neurocritical Care Consultation Note      Patient: Kristine Ramirez MRN: [id number]    YOB: 1975  Age: 49 y.o.  Sex: female   Unit: [facility] ICU TOWER Room/Bed: 4516/4516-01 Location: [facility]    Date of Consultation: 12/5/2024  Date of Admission: 12/5/2024  4:46 PM ( LOS: 0 days )  Admitting Physician: ROLANDA VELASCO    Primary Care Physician: Damon Mireles MD   Consult Requested By: MICU     Reason for Consult: \"R VA occlusion \"    ASSESSMENT & RECOMMENDATIONS     Assessment  49F with PMH of ESRD on HD, stroke with RUE weakness, HTN not on meds who was found unresponsive after the procedure.   CTA shows R VA occlusion, previous CTA in 2022 showed was unremarkable. Anaethesia record showed that her SBP and MAP were in the 75 and 60 between 300-315 before she was found unresponsive around 330. Overall, her symptoms could likely be explained by hypoperfusion in the posterior circulation in the setting of R VA occlusion     Recommendations  Neuro q1  MRI wake up protocol and call  stroke team  ECHO   SBP < 220 for 24 hours   Stroke labs   PT/OT/ST       SUBJECTIVE     Chief Complaint:   Unresponsive     History of Present Illness:  Kristine Ramirez is a 49 y.o. with PMH of ESRD on HD, stroke with RUE weakness, HTN, DM2, one eye blindness who is here for elective EGD for stomach ulcer and bx.  Reportedly, patient was sedated with 60 propofol, rapid response and stroke call was called due to fixed dilated pupils and O2 Sat in the 80s when they tried to wake her up. She was emergently intubated, CT head was negative and CTA shows R VA occlusion with partial distal reconstitution, mild R ICA stenosis.  stroke team was consulted at the time. Patient was transfer to ICU for further management. Upon arrival to ICU. Sedation was turned off and she was extubated     NCC was consulted for R VA occlusion     Per my interview with the patient, patient stated that she has blindness in her left  (HCC)    End-stage renal disease on hemodialysis (HCC)    Epiglottitis    Recurrent falls    Hypotension    Leukocytosis    Volume overload    Hemodialysis catheter dysfunction (HCC)    Hypoproteinemia (HCC)    GABRIELA (obstructive sleep apnea)    Type 2 diabetes mellitus with obesity (HCC)    Wheelchair dependence    ESRD (end stage renal disease) (HCC)    Hyperkalemia    Suspected COVID-19 virus infection    Dependent on walker for ambulation    Medication management contract agreement - signed on 5/18/2023    Controlled drug dependence (HCC)    Generalized anxiety disorder with panic attacks    Coagulation defect (HCC)    Chronic obstructive pulmonary disease, unspecified    Abdominal distention    Ascites, malignant    Encounter for assessment of ascites    Other ascites    Physical deconditioning    AMS (altered mental status)       Past Surgical History:  Past Surgical History:   Procedure Laterality Date    CERVIX SURGERY      laser tx for dysplasia;1992    DIALYSIS FISTULA CREATION Right 2/10/2022    RIGHT BRACHIO CEPHALIC FISTULA CREATION performed by Christiano Lomeli II, MD at [facility] OR    EYE SURGERY      FOOT SURGERY Right     FOOT SURGERY Bilateral     FOOT SURGERY Left     IR TUNNELED CATHETER PLACEMENT GREATER THAN 5 YEARS  9/7/2021    IR TUNNELED CATHETER PLACEMENT GREATER THAN 5 YEARS 9/7/2021 Omari Carson MD [facility] SPECIAL PROCEDURES    UPPER GASTROINTESTINAL ENDOSCOPY N/A 9/5/2024    ESOPHAGOGASTRODUODENOSCOPY performed by Joss Bernal MD at [facility] ENDOSCOPY       Family History:  family history includes Alcohol Abuse in her maternal grandfather and paternal grandfather; Colon Cancer in her father; Diabetes in her father, mother, paternal aunt, paternal grandmother, paternal uncle, and sister; High Blood Pressure in her father; Other (age of onset: 67) in her mother.    Social History:  she reports that she has been smoking cigarettes. She has a 15 pack-year smoking history. She has never used

## 2024-12-05 NOTE — H&P
Pre-operative History and Physical    Patient: Kristine Ramirez  : 1975  Acct#:     History Obtained From:  patient    HISTORY OF PRESENT ILLNESS:    The patient is a 49 y.o. female who presents with history of nausea and vomiting    Past Medical History:        Diagnosis Date    Acute respiratory failure due to COVID-19 10/16/2021    Arterial ischemic stroke, ICA, left, acute (HCC)     Blind in both eyes     Cerebral artery occlusion with cerebral infarction (HCC)     CHF (congestive heart failure) ([facility])     Chronic kidney disease     COPD (chronic obstructive pulmonary disease) ([facility])     Depression     Diabetes mellitus out of control     Diabetes mellitus, type II ([facility])         Diabetic neuropathy associated with type 2 diabetes mellitus ([facility])     Generalized headaches     Hypertension     Infertility     Insomnia     chronic vs lack of time spent to sleep    Migraine headache 2011    Mixed hyperlipidemia     Otitis media     h/o recurrent    Pelvic abscess in female 10/05/2013    Pneumonia     2004 approx.    Stroke ([facility]) 2020    Stroke ([facility]) 2021     Past Surgical History:        Procedure Laterality Date    CERVIX SURGERY      laser tx for dysplasia;    DIALYSIS FISTULA CREATION Right 2/10/2022    RIGHT BRACHIO CEPHALIC FISTULA CREATION performed by Christiano Lomeli II, MD at [facility] OR    EYE SURGERY      FOOT SURGERY Right     FOOT SURGERY Bilateral     FOOT SURGERY Left     IR TUNNELED CATHETER PLACEMENT GREATER THAN 5 YEARS  2021    IR TUNNELED CATHETER PLACEMENT GREATER THAN 5 YEARS 2021 Omari Carson MD [facility] SPECIAL PROCEDURES    UPPER GASTROINTESTINAL ENDOSCOPY N/A 2024    ESOPHAGOGASTRODUODENOSCOPY performed by Joss Bernal MD at [facility] ENDOSCOPY     Medications Prior to Admission:   No current facility-administered medications on file prior to encounter.     Current Outpatient Medications on File Prior to Encounter   Medication Sig Dispense

## 2024-12-05 NOTE — PROGRESS NOTES
Patient extubated at 1735. Patient A&Ox4. RUE is weaker at baseline from previous stroke.    1822: Patient down for STAT MRI. Neuro and RN present.     1857: Back in room with patient. Tolerated MRI well. No complications.

## 2024-12-05 NOTE — ANESTHESIA POSTPROCEDURE EVALUATION
Department of Anesthesiology  Postprocedure Note    Patient: Kristine Ramirez  MRN: [id number]  YOB: 1975  Date of evaluation: 12/5/2024    Procedure Summary       Date: 12/05/24 Room / Location: [place] [address] / [facility]    Anesthesia Start: 1452 Anesthesia Stop: 1611    Procedure: ESOPHAGOGASTRODUODENOSCOPY BIOPSY Diagnosis:       Gastric ulcer, unspecified chronicity, unspecified whether gastric ulcer hemorrhage or perforation present      (Gastric ulcer, unspecified chronicity, unspecified whether gastric ulcer hemorrhage or perforation present [K25.9])    Surgeons: Kina Montelongo MD Responsible Provider: Kyree Figueredo DO    Anesthesia Type: MAC ASA Status: 3            Anesthesia Type: MAC    Kay Phase I: Kay Score: 10    Kay Phase II:      Vitals:    12/05/24 1630   BP: (!) 179/93   Pulse: 96   Resp: 21   Temp:    SpO2: 99%       Anesthesia Post Evaluation    Patient location during evaluation: ICU  Patient participation: complete - patient participated  Level of consciousness: awake and agitated  Pain score: 0  Airway patency: patent  Nausea & Vomiting: nausea and no vomiting  Cardiovascular status: blood pressure returned to baseline  Respiratory status: ventilator and intubated  Hydration status: stable  Comments: Kristine Ramirez was assessed at the bedside where she was interactive with me and nodded her head and shook her head to yes and no questions appropriately.   Pain management: satisfactory to patient        No notable events documented.

## 2024-12-05 NOTE — SIGNIFICANT EVENT
Name: Kristine Ramirez            Admitted: 12/5/2024     Significant event:  Rapid response    Pt w/ a hx of HTN, CVA with residual right sided weakness, COPD, ESRD on HD, DM2, bilateral blindness. ICU team was called to bedside because patient became unresponsive in the endoscopy suite. Patient got EGD with 60 of propofol reported for sedation. Shortly after the procedure, the patient developed agonal breathing and was satting in the 80s. There was concern for unequal pupils. Patient is blind bilaterally. She was dusky appearing. Blood sugar 175. The patient was emergently intubated on additional propofol and taken for a CTA HN w/ contrast and CTH w/o and then transported to the ICU on a ventilator for further management. After arriving to the ICU, sedation was turned off and the patient became responsive. She is currently following commands and interacting appropriately. Will likely extubate shortly.    BP (!) 178/91   Pulse 92   Temp 97.3 °F (36.3 °C) (Temporal)   Resp 18   Ht 1.702 m (5' 7\")   Wt 65.8 kg (145 lb)   LMP 09/01/2022   SpO2 99%   BMI 22.71 kg/m²   General appearance: Dusky appearance   Lungs: Diminished bilaterally  Heart: S1/S2   Abdomen: Soft, non-distended   Neurologic:  Non-responsive to painful stimuli. Pupils dilated and non-recative    Echocardiogram  ECHO 2021 EF 55-60%    A/P  Respiratory distress likely 2/2 complication from anaesthesia   CTA HN w/ and CTH w/o ordered  Called code stroke team, will f/u recommendations  F/u ABG  F/u lactate      Orders: CTA HN w/ and CTH w/o, lactate, AGB    Code:Prior  Disposition: CHIDI Del Rio DO PGY-1 IM   12/5/2024  3:33 PM

## 2024-12-05 NOTE — SIGNIFICANT EVENT
I was called by Endoscopy nurse as there was concern of  a blown pupil in a patient that had just completed an esophagogastroduodenoscopy.  She is 49 years old and has an extensive past medical history including diabetes, ESRD on HD, and history of stroke approximately 5 years ago with reported mild right residual weakness.  I asked the endoscopy nurse to call a code stroke and I would be down to examine the patient.    When I arrived the patient was intubated but was notably cyanotic.  The ICU residents and neurocritical care NP, Hallie Gao, were already at bedside.  Anesthesia was placing a second IV to allow for sedation and paralysis to facilitate optimal images with head CT and CTA.    I obtained an ABG that showed pH of 7.33 with CO2 of 52 and pO2 of 372.  Sodium was 133 potassium 5.2 and glucose 249 with a lactate of 1.9.    The case was performed with MAC anesthesia and propofol.  There were no complications until the esophagogastroduodenoscopy scope was removed and patient became hypotensive, bradycardic, and hypoxemic.  Patient was intubated without difficulty.     I was unable to get a neuroexam as she was unresponsive.  Both pupils were dilated with only a mild inequality.  Of note she is reported as blind in both eyes.  She had not gotten any anticholinergic medications per anesthesia    Case discussed with  stroke team at 4:27 PM and no intervention needed   Head CT showed no acute intercranial abnormality.  Patient was admitted to the ICU and paralysis reversed.  Once paralysis reversed propofol was stopped  Patient gradually woke up and completed an SBT x 30 minutes when she became agitated with attempts to remove the tube.  I was notified of this and went to examine the patient and deemed her suitable for extubation and order was placed at 6267  CTA head was read of occlusion of proximal aspect of the vertebral artery with partial distal reconstitution.  Results were called to ICU by radiology

## 2024-12-05 NOTE — PROGRESS NOTES
Patient has been successfully weaned from Mechanical Ventilation.  RSBI before extubation was 10 with EtCO2 of 30 and SpO2 of 100 on 30% FiO2.  Patient extubated and placed on 2% O2 via nasal cannula.  Post extubation SpO2 is 93% with HR  70 bpm and RR 16 breaths/min.  Patient had strong cough that was non-productive.  Extubation Well tolerated by patient..

## 2024-12-05 NOTE — H&P
ICU HISTORY AND PHYSICAL       Admit Date:  12/5/2024                            Hospital Day:  ICU Day:       CC: agonal breathing    History obtained from:  chart review    SUBJECTIVE   HPI:    Ms. Kristine Ramirez is a 49 y.o. female with a medical hx significant for type II DM, HTN, ESRD (HD on MWF), HOLGER & panic attack, COPD otherwise as listed in the MHx table below, who presented from the endo room 2 after developing agonal breathing, desaturating to late 70s /early 80s and getting intubated.     The patient came for a scheduled outpatient endoscopy which she tolerated well.  After the procedure was over, the patient was weaned off of sedation but developed agonal breathing, started desaturating to 80s.  Patient was sedated on glycopyrrolate, propofol and lidocaine.  She was given Sugammadex for reversal.  Performed bag mask to help with her breathing but she was eventually intubated.  Code stroke was called as the patient had dilated nonreactive pupil.  NIHSS score was between 25 and 28.  It was limited as the patient was unresponsive, and did not withdraw to pain in all 4 extremities.  Patient was immediately taken to CT/CTA head and neck that did not show any acute bleed/ischemia or large vessel occlusion.  UC stroke team was called and it was decided that TNK was not required at this time.  POCT ABG showed pH of 7.3, pCO2 of 52 and pO2 of 372. LA WNL.     In the ICU, the patient was weaned off of sedation and was given a SBT.  Patient was able to maintain good tidal volume, was awake and was able to move all 4 extremities.         Past Medical History:   Diagnosis Date   • Acute respiratory failure due to COVID-19 10/16/2021   • Arterial ischemic stroke, ICA, left, acute (HCC)    • Blind in both eyes    • Cerebral artery occlusion with cerebral infarction ([facility])    • CHF (congestive heart failure) ([facility])    • Chronic kidney disease    • COPD (chronic obstructive pulmonary disease) ([facility])    • Depression   Temporal   SpO2: 99%   Weight: 65.8 kg (145 lb)   Height: 1.702 m (5' 7\")       I/O:    No intake or output data in the 24 hours ending 12/05/24 1632  No intake/output data recorded.  No intake/output data recorded.      PHYSICAL EXAM:  Physical Exam  Constitutional:       Comments: Patient intubated and waking up from sedation    Eyes:      Comments: Pupils dilated and not reacting to light. Patient is legally blind   Cardiovascular:      Rate and Rhythm: Normal rate and regular rhythm.      Pulses: Normal pulses.   Pulmonary:      Breath sounds: No wheezing.   Abdominal:      Palpations: Abdomen is soft.   Neurological:      Comments: Intubated. Patient weaned off of sedation. Patient awake, following commands and able to move all 4 extremities         LABS:  ABGs:    Recent Labs     12/05/24  1540   PHART 7.328*   VRU4VQJ 51.6*   PO2ART 372.4*     VBGs:  No results for input(s): \"PHVEN\", \"PUZ7MUQ\", \"PO2VEN\" in the last 72 hours.  No results for input(s): \"WBC\", \"HGB\", \"HCT\", \"PLT\" in the last 72 hours.    No results for input(s): \"NA\", \"K\", \"CL\", \"CO2\", \"BUN\", \"CREATININE\", \"GLUCOSE\", \"PHOS\" in the last 72 hours.    Invalid input(s): \"CA\"  No results for input(s): \"AST\", \"ALT\", \"BILITOT\", \"ALKPHOS\" in the last 72 hours.    Invalid input(s): \"ALB\"  No results for input(s): \"URICACID\" in the last 72 hours.  No results for input(s): \"TROPHS\" in the last 72 hours.  No results for input(s): \"PROBNP\" in the last 72 hours.  No results for input(s): \"INR\" in the last 72 hours.  No results for input(s): \"SEDRATE\" in the last 72 hours.  No results for input(s): \"CRP\" in the last 72 hours.  Recent Labs     12/05/24  1540   LACTATE 1.94         UA/micro: No results for input(s): \"NITRITE\", \"COLORU\", \"PHUR\", \"LABCAST\", \"WBCUA\", \"RBCUA\", \"MUCUS\", \"TRICHOMONAS\", \"YEAST\", \"BACTERIA\", \"CLARITYU\", \"SPECGRAV\", \"LEUKOCYTESUR\", \"UROBILINOGEN\", \"BILIRUBINUR\", \"BLOODU\", \"GLUCOSEU\", \"AMORPHOUS\" in the last 72 hours.    Invalid input(s):

## 2024-12-05 NOTE — PROCEDURES
PROCEDURE NOTE  Date: 2024   Name: Kristine Ramirez  YOB: 1975    Procedures  EGD, biopsy                Endoscopy Note    Patient: Kristine Ramirez  : 1975  Acct#:     Procedure: Esophagogastroduodenoscopy with biopsy    Date:  2024     Surgeon:  Kina Montelongo MD,     Referring Physician:  Damon Mireles MD      Preoperative Diagnosis:    49-year-old female is here for EGD with complaints of having intermittent nausea and vomiting.  She had EGD in September which showed findings of gastric ulcer.  She has history of ESRD on hemodialysis, with history of ascites, previous paracentesis fluid analysis suggestive of SAG less than 1.1 which is not consistent with portal hypertension.  Cytology showed atypical cells      Anesthesia: MAC anesthesia      Consent:  The patient or their legal guardian has signed an informed consent, and is aware of the potential risks, benefits, alternatives, and potential complications of this procedure.  These include, but are not limited to hemorrhage, bleeding, post procedural pain, perforation, phlebitis, aspiration, hypotension, hypoxia, cardiovascular events such as arryhthmia, and possibly death.     Description of Procedure: The patient was then taken to the endoscopy suite, placed in the left lateral decubitus position and the above IV sedation was administrered.  The Olympus video endoscope was placed through the patient's oropharynx without difficulty to the extent of the 2nd portion of the duodenum. The patient tolerated the procedure well and was taken to the post anesthesia care unit in good condition.    Complications: None  EBL: none      Findings:  Esophagus:  Visualization of the esophagus demonstrated normal esophageal mucosa.  GE junction at 40 cm.     Stomach:  The scope was then advanced into the stomach.  Both forward and retroflexed views of the stomach were obtained.  Visualization of the gastric body and antrum demonstrated  large amount of retained food seen in stomach suggestive of gastroparesis.  Mild erythematous mucosa seen in antrum biopsies obtained.  A retroflexed exam of the gastric cardia and fundus demonstrated normal mucosa.  The pylorus was patent and the scope was advanced into the duodenum.  Duodenum: Visualization of the duodenal bulb and the second portion of the duodenum demonstrated normal mucosa.  The scope was then withdrawn back into the stomach, it was decompressed, and the scope was completely withdrawn.    Impression:    Large amount of retained food material seen in stomach suggestive of gastroparesis, limiting the exam  Mild antral gastritis          Giancarlo Montelongo MD  [facility]  [phone]      Addendum:  Upon completion of EGD patient was noted to have low oxygen saturation for which she was started on mask ventilation by CRNA and anesthesiologist.  She had agonal breathing.  Pulse feeble,  tachycardic, ranging from 130-140.  Patient not responding to any verbal or deep stimuli.  She was intubated by anesthesia team.  ICU team informed.  Code stroke was called.

## 2024-12-05 NOTE — ANESTHESIA PRE PROCEDURE
METS)  (+) hypertension:    (-) past MI, CABG/stent and murmur      Rhythm: regular  Rate: normal                    Neuro/Psych:   (+) CVA (3 years ago. right sided weakness.): residual symptomsdepression/anxiety    (-) seizures           GI/Hepatic/Renal:   (+) renal disease (M,W,F): ESRD and dialysis     (-) liver disease       Endo/Other:    (+) DiabetesType II DM, well controlled, blood dyscrasia (reports at dialysis she had a hemoglobin of 11 which was on 12/2): anemia:..                 Abdominal:         (-) obese       Vascular:          Other Findings:             Anesthesia Plan      MAC     ASA 3       Induction: intravenous.      Anesthetic plan and risks discussed with patient.      Plan discussed with CRNA.    Attending anesthesiologist reviewed and agrees with Preprocedure content                Kyree Figueredo DO   12/5/2024

## 2024-12-05 NOTE — PROGRESS NOTES
Patient admitted to 4516 post code stroke in [facility]. Intubated, paralyzed and on 50 of propofol.     Reversal agent given at 1608 by endo CRNA. Propofol turned off at 1610. Dr. Ruiz at bedside. Patient hypertensive. SBP 170s-190s. ICU team aware. Patient's  updated by endo team.    CHG bath given. Sacral heart applied. Four eyes skin assessment completed.     1631: Patient started following commands x4 extremities. Opens eyes spontaneously. She nods appropriately to questions.     1650: Patient sitting straight up in bed reaching for ET tube. This RN called RT. Patient placed on SBT. ICU residents at bedside.

## 2024-12-05 NOTE — PLAN OF CARE
Called back  stroke team following finding of occlusion of the intracranial portion of the right vertebral artery with partial reconstitution distally on CTA Head and Neck and given that the earlier physician said the CTA H&N was normal.     Spoke to Dr Nair who was yelling over the phone and asked me what I would like her to do. She stated that since the patient has no deficits that \"they are not going to do anything anyway\".    D/w Pebbles, Neuro NP.

## 2024-12-05 NOTE — PROGRESS NOTES
4 Eyes Admission Assessment     I agree as the admission nurse that 2 RN's have performed a thorough Head to Toe Skin Assessment on the patient. ALL assessment sites listed below have been assessed on admission.       Areas assessed by both nurses:   [x]   Head, Face, and Ears   [x]   Shoulders, Back, and Chest  [x]   Arms, Elbows, and Hands   [x]   Coccyx, Sacrum, and Ischium  [x]   Legs, Feet, and Heels        Does the Patient have Skin Breakdown?  Yes a wound was noted on the Admission Assessment and an LDA was Initiated documentation include the [place]-wound, Wound Assessment, Measurements, Dressing Treatment, Drainage, and Color\",   Tear and redness on sacrum  Petechiae BLE  Redness bilateral heels  Redness/purple on outer right foot  Healing wound on R big toe     Robbie Prevention initiated:  Yes   Wound Care Orders initiated:  Yes      [facility] nurse consulted for Pressure Injury (Stage 3,4, Unstageable, DTI, NWPT, and Complex wounds) or Robbie score 18 or lower:  Yes      Nurse 1 eSignature: Electronically signed by Lakeisha Aguila RN on 12/5/24 at 5:09 PM EST    **SHARE this note so that the co-signing nurse is able to place an eSignature**    Nurse 2 eSignature: {Esignature:[id number]}

## 2024-12-06 ENCOUNTER — APPOINTMENT (OUTPATIENT)
Age: 49
DRG: 917 | End: 2024-12-06
Attending: STUDENT IN AN ORGANIZED HEALTH CARE EDUCATION/TRAINING PROGRAM
Payer: COMMERCIAL

## 2024-12-06 ENCOUNTER — CARE COORDINATION (OUTPATIENT)
Dept: CARE COORDINATION | Age: 49
End: 2024-12-06

## 2024-12-06 LAB
ALBUMIN SERPL-MCNC: 3.7 G/DL (ref 3.4–5)
ANION GAP SERPL CALCULATED.3IONS-SCNC: 18 MMOL/L (ref 3–16)
BASOPHILS # BLD: 0 K/UL (ref 0–0.2)
BASOPHILS NFR BLD: 0.7 %
BUN SERPL-MCNC: 60 MG/DL (ref 7–20)
CALCIUM SERPL-MCNC: 7.8 MG/DL (ref 8.3–10.6)
CHLORIDE SERPL-SCNC: 89 MMOL/L (ref 99–110)
CHOLEST SERPL-MCNC: 105 MG/DL (ref 0–199)
CO2 SERPL-SCNC: 26 MMOL/L (ref 21–32)
CREAT SERPL-MCNC: 5.3 MG/DL (ref 0.6–1.1)
DEPRECATED RDW RBC AUTO: 17.9 % (ref 12.4–15.4)
ECHO BSA: 1.83 M2
ECHO IVC INSP: 1.6 CM
ECHO IVC PROX: 2.4 CM
ECHO LV EDV A2C: 133 ML
ECHO LV EDV A4C: 125 ML
ECHO LV EDV INDEX A4C: 69 ML/M2
ECHO LV EDV NDEX A2C: 73 ML/M2
ECHO LV EJECTION FRACTION A2C: 28 %
ECHO LV EJECTION FRACTION A4C: 29 %
ECHO LV EJECTION FRACTION BIPLANE: 29 % (ref 55–100)
ECHO LV ESV A2C: 95 ML
ECHO LV ESV A4C: 88 ML
ECHO LV ESV INDEX A2C: 52 ML/M2
ECHO LV ESV INDEX A4C: 48 ML/M2
ECHO LV FRACTIONAL SHORTENING: 12 % (ref 28–44)
ECHO LV INTERNAL DIMENSION DIASTOLE INDEX: 2.86 CM/M2
ECHO LV INTERNAL DIMENSION DIASTOLIC: 5.2 CM (ref 3.9–5.3)
ECHO LV INTERNAL DIMENSION SYSTOLIC INDEX: 2.53 CM/M2
ECHO LV INTERNAL DIMENSION SYSTOLIC: 4.6 CM
ECHO LV IVSD: 1.1 CM (ref 0.6–0.9)
ECHO LV MASS 2D: 248.8 G (ref 67–162)
ECHO LV MASS INDEX 2D: 136.7 G/M2 (ref 43–95)
ECHO LV POSTERIOR WALL DIASTOLIC: 1.3 CM (ref 0.6–0.9)
ECHO LV RELATIVE WALL THICKNESS RATIO: 0.5
ECHO RV FREE WALL PEAK S': 8.4 CM/S
ECHO RV TAPSE: 1.5 CM (ref 1.7–?)
ECHO TV REGURGITANT MAX VELOCITY: 2.86 M/S
ECHO TV REGURGITANT PEAK GRADIENT: 33 MMHG
EKG ATRIAL RATE: 108 BPM
EKG DIAGNOSIS: NORMAL
EKG P AXIS: 42 DEGREES
EKG P-R INTERVAL: 148 MS
EKG Q-T INTERVAL: 352 MS
EKG QRS DURATION: 82 MS
EKG QTC CALCULATION (BAZETT): 471 MS
EKG R AXIS: 92 DEGREES
EKG T AXIS: 14 DEGREES
EKG VENTRICULAR RATE: 108 BPM
EOSINOPHIL # BLD: 0.1 K/UL (ref 0–0.6)
EOSINOPHIL NFR BLD: 1 %
EST. AVERAGE GLUCOSE BLD GHB EST-MCNC: 139.9 MG/DL
GFR SERPLBLD CREATININE-BSD FMLA CKD-EPI: 9 ML/MIN/{1.73_M2}
GLUCOSE BLD-MCNC: 153 MG/DL (ref 70–99)
GLUCOSE BLD-MCNC: 161 MG/DL (ref 70–99)
GLUCOSE BLD-MCNC: 212 MG/DL (ref 70–99)
GLUCOSE BLD-MCNC: 242 MG/DL (ref 70–99)
GLUCOSE BLD-MCNC: 261 MG/DL (ref 70–99)
GLUCOSE BLD-MCNC: 97 MG/DL (ref 70–99)
GLUCOSE SERPL-MCNC: 130 MG/DL (ref 70–99)
HBA1C MFR BLD: 6.5 %
HCT VFR BLD AUTO: 35.6 % (ref 36–48)
HDLC SERPL-MCNC: 44 MG/DL (ref 40–60)
HGB BLD-MCNC: 11.4 G/DL (ref 12–16)
LDLC SERPL CALC-MCNC: 48 MG/DL
LYMPHOCYTES # BLD: 1 K/UL (ref 1–5.1)
LYMPHOCYTES NFR BLD: 15.3 %
MAGNESIUM SERPL-MCNC: 1.86 MG/DL (ref 1.8–2.4)
MCH RBC QN AUTO: 29.9 PG (ref 26–34)
MCHC RBC AUTO-ENTMCNC: 32.1 G/DL (ref 31–36)
MCV RBC AUTO: 93.1 FL (ref 80–100)
MONOCYTES # BLD: 0.5 K/UL (ref 0–1.3)
MONOCYTES NFR BLD: 7.7 %
NEUTROPHILS # BLD: 5 K/UL (ref 1.7–7.7)
NEUTROPHILS NFR BLD: 75.3 %
PERFORMED ON: ABNORMAL
PERFORMED ON: NORMAL
PHOSPHATE SERPL-MCNC: 8.6 MG/DL (ref 2.5–4.9)
PLATELET # BLD AUTO: 180 K/UL (ref 135–450)
PMV BLD AUTO: 8.8 FL (ref 5–10.5)
POTASSIUM SERPL-SCNC: 5.1 MMOL/L (ref 3.5–5.1)
RBC # BLD AUTO: 3.82 M/UL (ref 4–5.2)
REASON FOR REJECTION: NORMAL
REJECTED TEST: NORMAL
SODIUM SERPL-SCNC: 133 MMOL/L (ref 136–145)
TRIGL SERPL-MCNC: 66 MG/DL (ref 0–150)
VLDLC SERPL CALC-MCNC: 13 MG/DL
WBC # BLD AUTO: 6.6 K/UL (ref 4–11)

## 2024-12-06 PROCEDURE — 80061 LIPID PANEL: CPT

## 2024-12-06 PROCEDURE — 99233 SBSQ HOSP IP/OBS HIGH 50: CPT | Performed by: INTERNAL MEDICINE

## 2024-12-06 PROCEDURE — 97162 PT EVAL MOD COMPLEX 30 MIN: CPT

## 2024-12-06 PROCEDURE — 85025 COMPLETE CBC W/AUTO DIFF WBC: CPT

## 2024-12-06 PROCEDURE — 6370000000 HC RX 637 (ALT 250 FOR IP)

## 2024-12-06 PROCEDURE — 97166 OT EVAL MOD COMPLEX 45 MIN: CPT

## 2024-12-06 PROCEDURE — 97530 THERAPEUTIC ACTIVITIES: CPT

## 2024-12-06 PROCEDURE — 2580000003 HC RX 258

## 2024-12-06 PROCEDURE — 99232 SBSQ HOSP IP/OBS MODERATE 35: CPT

## 2024-12-06 PROCEDURE — 6360000002 HC RX W HCPCS

## 2024-12-06 PROCEDURE — 36415 COLL VENOUS BLD VENIPUNCTURE: CPT

## 2024-12-06 PROCEDURE — 83036 HEMOGLOBIN GLYCOSYLATED A1C: CPT

## 2024-12-06 PROCEDURE — 93321 DOPPLER ECHO F-UP/LMTD STD: CPT | Performed by: INTERNAL MEDICINE

## 2024-12-06 PROCEDURE — 6360000004 HC RX CONTRAST MEDICATION: Performed by: STUDENT IN AN ORGANIZED HEALTH CARE EDUCATION/TRAINING PROGRAM

## 2024-12-06 PROCEDURE — 80069 RENAL FUNCTION PANEL: CPT

## 2024-12-06 PROCEDURE — 83735 ASSAY OF MAGNESIUM: CPT

## 2024-12-06 PROCEDURE — 6370000000 HC RX 637 (ALT 250 FOR IP): Performed by: INTERNAL MEDICINE

## 2024-12-06 PROCEDURE — 93308 TTE F-UP OR LMTD: CPT | Performed by: INTERNAL MEDICINE

## 2024-12-06 PROCEDURE — 90935 HEMODIALYSIS ONE EVALUATION: CPT

## 2024-12-06 PROCEDURE — 93325 DOPPLER ECHO COLOR FLOW MAPG: CPT | Performed by: INTERNAL MEDICINE

## 2024-12-06 PROCEDURE — C8924 2D TTE W OR W/O FOL W/CON,FU: HCPCS

## 2024-12-06 PROCEDURE — 2000000000 HC ICU R&B

## 2024-12-06 PROCEDURE — 99222 1ST HOSP IP/OBS MODERATE 55: CPT | Performed by: INTERNAL MEDICINE

## 2024-12-06 PROCEDURE — 5A1D70Z PERFORMANCE OF URINARY FILTRATION, INTERMITTENT, LESS THAN 6 HOURS PER DAY: ICD-10-PCS | Performed by: INTERNAL MEDICINE

## 2024-12-06 PROCEDURE — 97116 GAIT TRAINING THERAPY: CPT

## 2024-12-06 RX ORDER — HYDRALAZINE HYDROCHLORIDE 50 MG/1
25 TABLET, FILM COATED ORAL EVERY 8 HOURS SCHEDULED
Status: DISCONTINUED | OUTPATIENT
Start: 2024-12-06 | End: 2024-12-07

## 2024-12-06 RX ORDER — CLOPIDOGREL BISULFATE 75 MG/1
75 TABLET ORAL DAILY
Status: DISCONTINUED | OUTPATIENT
Start: 2024-12-07 | End: 2024-12-07 | Stop reason: HOSPADM

## 2024-12-06 RX ORDER — INSULIN LISPRO 100 [IU]/ML
0-8 INJECTION, SOLUTION INTRAVENOUS; SUBCUTANEOUS
Status: DISCONTINUED | OUTPATIENT
Start: 2024-12-06 | End: 2024-12-07 | Stop reason: HOSPADM

## 2024-12-06 RX ORDER — TORSEMIDE 20 MG/1
40 TABLET ORAL DAILY
COMMUNITY

## 2024-12-06 RX ORDER — INSULIN GLARGINE 100 [IU]/ML
5 INJECTION, SOLUTION SUBCUTANEOUS NIGHTLY PRN
COMMUNITY

## 2024-12-06 RX ORDER — UMECLIDINIUM BROMIDE AND VILANTEROL TRIFENATATE 62.5; 25 UG/1; UG/1
1 POWDER RESPIRATORY (INHALATION) DAILY PRN
COMMUNITY

## 2024-12-06 RX ORDER — PANTOPRAZOLE SODIUM 40 MG/1
40 TABLET, DELAYED RELEASE ORAL 2 TIMES DAILY
Status: DISCONTINUED | OUTPATIENT
Start: 2024-12-06 | End: 2024-12-07 | Stop reason: HOSPADM

## 2024-12-06 RX ORDER — ALPRAZOLAM 0.25 MG/1
0.25 TABLET ORAL 2 TIMES DAILY
Status: DISCONTINUED | OUTPATIENT
Start: 2024-12-06 | End: 2024-12-06

## 2024-12-06 RX ORDER — ALPRAZOLAM 0.5 MG
1 TABLET ORAL 2 TIMES DAILY
Status: DISCONTINUED | OUTPATIENT
Start: 2024-12-06 | End: 2024-12-07 | Stop reason: HOSPADM

## 2024-12-06 RX ORDER — SEVELAMER CARBONATE FOR ORAL SUSPENSION 2400 MG/1
2.4 POWDER, FOR SUSPENSION ORAL
COMMUNITY

## 2024-12-06 RX ORDER — ENOXAPARIN SODIUM 100 MG/ML
40 INJECTION SUBCUTANEOUS DAILY
Status: DISCONTINUED | OUTPATIENT
Start: 2024-12-06 | End: 2024-12-06

## 2024-12-06 RX ORDER — HEPARIN SODIUM 5000 [USP'U]/ML
5000 INJECTION, SOLUTION INTRAVENOUS; SUBCUTANEOUS EVERY 8 HOURS SCHEDULED
Status: DISCONTINUED | OUTPATIENT
Start: 2024-12-06 | End: 2024-12-07 | Stop reason: HOSPADM

## 2024-12-06 RX ADMIN — HEPARIN SODIUM 5000 UNITS: 5000 INJECTION INTRAVENOUS; SUBCUTANEOUS at 21:32

## 2024-12-06 RX ADMIN — ALPRAZOLAM 0.25 MG: 0.25 TABLET ORAL at 11:05

## 2024-12-06 RX ADMIN — HYDRALAZINE HYDROCHLORIDE 25 MG: 50 TABLET ORAL at 21:32

## 2024-12-06 RX ADMIN — PANTOPRAZOLE SODIUM 40 MG: 40 TABLET, DELAYED RELEASE ORAL at 06:34

## 2024-12-06 RX ADMIN — SODIUM CHLORIDE, PRESERVATIVE FREE 10 ML: 5 INJECTION INTRAVENOUS at 21:33

## 2024-12-06 RX ADMIN — BUSPIRONE HYDROCHLORIDE 10 MG: 5 TABLET ORAL at 09:55

## 2024-12-06 RX ADMIN — ALPRAZOLAM 1 MG: 0.5 TABLET ORAL at 21:32

## 2024-12-06 RX ADMIN — VENLAFAXINE HYDROCHLORIDE 37.5 MG: 37.5 CAPSULE, EXTENDED RELEASE ORAL at 09:55

## 2024-12-06 RX ADMIN — SODIUM CHLORIDE, PRESERVATIVE FREE 10 ML: 5 INJECTION INTRAVENOUS at 09:56

## 2024-12-06 RX ADMIN — INSULIN LISPRO 1 UNITS: 100 INJECTION, SOLUTION INTRAVENOUS; SUBCUTANEOUS at 06:42

## 2024-12-06 RX ADMIN — HEPARIN SODIUM 5000 UNITS: 5000 INJECTION INTRAVENOUS; SUBCUTANEOUS at 09:55

## 2024-12-06 RX ADMIN — ONDANSETRON 4 MG: 2 INJECTION INTRAMUSCULAR; INTRAVENOUS at 18:58

## 2024-12-06 RX ADMIN — ASPIRIN 81 MG: 81 TABLET, CHEWABLE ORAL at 09:55

## 2024-12-06 RX ADMIN — HEPARIN SODIUM 5000 UNITS: 5000 INJECTION INTRAVENOUS; SUBCUTANEOUS at 15:55

## 2024-12-06 RX ADMIN — PANTOPRAZOLE SODIUM 40 MG: 40 TABLET, DELAYED RELEASE ORAL at 21:32

## 2024-12-06 RX ADMIN — HYDRALAZINE HYDROCHLORIDE 25 MG: 50 TABLET ORAL at 18:36

## 2024-12-06 RX ADMIN — INSULIN LISPRO 2 UNITS: 100 INJECTION, SOLUTION INTRAVENOUS; SUBCUTANEOUS at 21:40

## 2024-12-06 RX ADMIN — SULFUR HEXAFLUORIDE 2 ML: 60.7; .19; .19 INJECTION, POWDER, LYOPHILIZED, FOR SUSPENSION INTRAVENOUS; INTRAVESICAL at 12:11

## 2024-12-06 RX ADMIN — ACETAMINOPHEN 650 MG: 325 TABLET ORAL at 06:34

## 2024-12-06 RX ADMIN — BUSPIRONE HYDROCHLORIDE 10 MG: 5 TABLET ORAL at 21:32

## 2024-12-06 ASSESSMENT — PAIN SCALES - GENERAL
PAINLEVEL_OUTOF10: 6
PAINLEVEL_OUTOF10: 0

## 2024-12-06 ASSESSMENT — ENCOUNTER SYMPTOMS
CONSTIPATION: 0
ABDOMINAL DISTENTION: 1
ABDOMINAL PAIN: 0
PHOTOPHOBIA: 0
VOMITING: 0
NAUSEA: 0
DIARRHEA: 0
BACK PAIN: 1
WHEEZING: 0
COUGH: 0
SHORTNESS OF BREATH: 0
CHEST TIGHTNESS: 0

## 2024-12-06 ASSESSMENT — PAIN - FUNCTIONAL ASSESSMENT: PAIN_FUNCTIONAL_ASSESSMENT: ACTIVITIES ARE NOT PREVENTED

## 2024-12-06 ASSESSMENT — PAIN DESCRIPTION - DESCRIPTORS: DESCRIPTORS: ACHING

## 2024-12-06 ASSESSMENT — PAIN DESCRIPTION - ORIENTATION: ORIENTATION: LEFT

## 2024-12-06 ASSESSMENT — PAIN DESCRIPTION - LOCATION: LOCATION: HEAD

## 2024-12-06 ASSESSMENT — PULMONARY FUNCTION TESTS: PIF_VALUE: 18

## 2024-12-06 NOTE — PROGRESS NOTES
NEUROCRITICAL CARE PROGRESS NOTE       Patient Name: Kristine Ramirez YOB: 1975   Sex: Female Age: 49 yrs     CC / Reason for Consult: R VA occlussion    Changes over last 24 hours:   No stroke on wake up MRI  Patient doing well, neurologic exam unremarkable, is curious about her EGD    ROS: No complaints    ASSESSMENT & RECOMMENDATIONS   Assessment:  49F with PMH of ESRD on HD, stroke with RUE weakness, HTN not on meds who was found unresponsive after EGD with dilated pupil. CTA revealed R vertebral artery occlusion that is age indeterminant, though not on CTA in 2022. Given CTA findings, Wake up MRI pursued which did not reveal stroke. Did have some reported hypotension in her anesthesia record.   Overall, suspicious for adverse reaction to anesthesia, possibly with hypotension and reduced EF all contributing to her transient episode of unresponsiveness.    Plan:  - No stroke on wake up MRI  - ECHO with EF 30 - 35%, new since ECHO on 08/24  - Given CTA results, plan for DAPT therapy for 90 days given she has consistently taken ASA, unless cardiology desires to anticoagulate her given ECHO results  - Discussed potential DAPT with Dr. Ruiz, no objection to DAPT from ICU team, reports no ulcers found on EGD  - Agree with initiation of PPI per ICU team  - Aim for normotension  - Will additionally review CTA with Neurovascular surgeon  - Q4 hour neuro exams  - Call Neurology with any changes    Case discussed with Dr. Farnsworth, Dr. Ruiz, bedside RN, patient, Dr. Stephanie Agrawal, RIVERA - CNP   NEUROCRITICAL CARE  12/6/2024 10:19 AM  [facility]: [facility] NEUROCRITICAL CARE  HISTORY     Initial HPI: Kristine Ramirez is a 49 y.o. with PMH of ESRD on HD, stroke with RUE weakness, HTN, DM2, one eye blindness who is here for elective EGD for stomach ulcer and bx.  Reportedly, patient was sedated with 60 propofol, rapid response and stroke call was called due to fixed dilated pupils and O2 Sat in the 80s  or  stenosis. No evidence for aneurysm or arteriovenous malformation.     POSTERIOR CIRCULATION: There is occlusion of the right vertebral artery with  distal reconstitution in the intracranial segment. Basilar artery is intact.  Left vertebral artery appears intact. Fetal origin of the right posterior  cerebral artery. The posterior cerebral arteries are intact bilaterally.     INTRACRANIAL VENOUS SYSTEM: No evidence for intracranial venous thrombosis.     INTRACRANIAL HEMORRHAGE: None.     VENTRICLES: Normal in size and configuration for age.     BRAIN PARENCHYMA: Gray-white matter differentiation is normal. No intracranial  mass effect.     SKULL: No destructive osseous process or fracture.     PARANASAL SINUSES / MASTOIDS: No acute sinusitis or mastoiditis.     ORBITS: Normal.     EXTRACRANIAL SOFT TISSUES: Normal.     OTHER: None.     IMPRESSION:     Neck     1. There is occlusion of the proximal aspect of the vertebral artery with  partial distal reconstitution loss of opacification in the intracranial portion  with distal reconstitution.  2. Mild stenosis of the right internal carotid artery origin with extensive  calcific plaque.     Head     1. Occlusion of the intracranial portion of the right vertebral artery with  partial reconstitution distally.  2. No intracranial large vessel occlusion otherwise. No acute intracranial  vascular abnormality otherwise identified.    PHYSICAL EXAMINATION     PHYSICAL EXAM:  Vitals:    12/06/24 0645 12/06/24 0700 12/06/24 0800 12/06/24 0900   BP: (!) 166/97 (!) 150/77 (!) 157/91 (!) 141/77   Pulse: 91 91 91 91   Resp: 15 16 15 13   Temp:   98 °F (36.7 °C)    TempSrc:   Temporal    SpO2:  99% 100%    Weight:             General: Alert, no distress, well-nourished  Neurologic  Mental status: Awake, alert and oriented x 4, no dysarthria or aphasia, following commands    Cranial nerves:   CN2: Visual fields full w/o extinction on confrontational testing   CN 3,4,6: Pupils

## 2024-12-06 NOTE — PROGRESS NOTES
Kristine Ramirez awake, talkative and quite hungry. She insists that she would like a pepsi and some food to eat as she hasn't eaten anything since 9am on 12/4.    Renal diet order placed. ICU team aware.    8:33 PM    Kyree Figueredo, DO

## 2024-12-06 NOTE — CONSULTS
ICU CONSULT       PCP:  Damon Mireles MD          Admit Date:  12/5/2024                            Hospital Day:  ICU Day:       CC: Abdominal Distension  Reason for consult: CVA  History obtained from:  chart review and the patient    SUBJECTIVE   HPI:    Ms. Kristine Ramirez is a 49 y.o. female with a medical hx significant for type II DM, HTN, ESRD (HD on MWF), HOLGER & panic attack, COPD otherwise as listed in the MHx table below, who presented from the endo room 2 after developing agonal breathing, desaturating to late 70s /early 80s and getting intubated.      The patient came for a scheduled outpatient endoscopy which she tolerated well.  After the procedure was over, the patient was weaned off of sedation but developed agonal breathing, started desaturating to 80s.  Patient was sedated on glycopyrrolate, propofol and lidocaine.  She was given Sugammadex for reversal.  Performed bag mask to help with her breathing but she was eventually intubated.  Code stroke was called as the patient had dilated nonreactive pupil.  NIHSS score was between 25 and 28.  It was limited as the patient was unresponsive, and did not withdraw to pain in all 4 extremities.  Patient was immediately taken to CT/CTA head and neck.   UC stroke team was called and it was decided that TNK was not required at this time.  POCT ABG showed pH of 7.3, pCO2 of 52 and pO2 of 372. LA WNL.      In the ICU, the patient was weaned off of sedation and was given a SBT.  Patient was able to maintain good tidal volume, was awake and was able to move all 4 extremities. She was successfully extubated shortly after.    Past Medical History:   Diagnosis Date    Acute respiratory failure due to COVID-19 10/16/2021    Arterial ischemic stroke, ICA, left, acute (HCC)     Blind in both eyes     Cerebral artery occlusion with cerebral infarction (HCC)     CHF (congestive heart failure) (HCC)     Chronic kidney disease     COPD (chronic obstructive pulmonary  HTN, ESRD (HD on MWF), HOLGER & panic attack, COPD otherwise as listed in the MHx table below, who presented from the endo room 2 after developing agonal breathing, desaturating to late 70s /early 80s and getting intubated.    Neuro    Right Vertebral Artery Occlusion  CTA shows right vertebral artery occlusion. Previous CTA in 2022 was unremarkable. Pt's SBP and MAP were 75 and 60 soon before she was found to be unresponsive. Likely hypoperfusion in the posterior circulation 2/2 right vertebral artery occlusion..  - Neurochecks q1h  - Echo  - SBP goal < 220  - PT/OT  - SLP    Migraine  On rizatriptan at home.  - Holding    Pulmonary    Respiratory Distress likely 2/2 Complication of Anesthesia   Extubated successfully on 12/5. Saturating well on room air.    COPD  On levalbuterol, umeclidinium-vilanterol at home.  - Albuterol PRN     Cardiovascular    HTN  On nebivolol 5 mg, Torsemide 20 mg, Edarbi 80 mg, Clonidine 0.2 mg at home.  - Holding home meds for permissive hypertension..     GI  Diet: ADULT DIET; Regular; Low Sodium (2 gm); Low Potassium (Less than 3000 mg/day); Low Phosphorus (Less than 1000 mg)  GI ppx: Protonix 40 mg IV qd     Ascites controlled with medication  On torsemide at home.  - Holding for permissive hypertension    Gastric Ulcer  - Protonix 40 mg IV qd     Constipation  - Glycolax PRN    Renal     ESRD on dialysis  Patient is compliant with attending her dialysis session on [facility].      Pt reports only urinating once every 3 days. ESRD.    Endo    T2DM  Last A1c 6.1 on 7/6/2023.  Home meds include Trulicity, insulin glargine 5 units nightly.   - Medium dose sliding scale  - POCT glucose check  - Hypoglycemia protocol    Mood    Anxiety/depression  On Buspar 10 mg, desvenlafaxine 25 mg at home.  - Continue buspar  - Venlafaxine substituted for desvenlafaxine    Glycemic goal:  140-180 mg/dL  LDAs: PIV x2, HD Tunneled Catheter  Infusions:  NS  Abx: N/A   Diet:  ADULT DIET; Regular; Low Sodium (2

## 2024-12-06 NOTE — PLAN OF CARE
Problem: Chronic Conditions and Co-morbidities  Goal: Patient's chronic conditions and co-morbidity symptoms are monitored and maintained or improved  12/6/2024 1216 by Lakeisha Aguila RN  Outcome: Progressing  12/5/2024 2303 by Elissa Becker RN  Outcome: Progressing     Problem: Discharge Planning  Goal: Discharge to home or other facility with appropriate resources  12/6/2024 1216 by Lakeisha Aguila RN  Outcome: Progressing  Flowsheets (Taken 12/5/2024 2304 by Elissa Becker RN)  Discharge to home or other facility with appropriate resources:   Identify barriers to discharge with patient and caregiver   Arrange for needed discharge resources and transportation as appropriate   Identify discharge learning needs (meds, wound care, etc)   Arrange for interpreters to assist at discharge as needed   Refer to discharge planning if patient needs post-hospital services based on physician order or complex needs related to functional status, cognitive ability or social support system  12/5/2024 2303 by Elissa Becker RN  Outcome: Progressing     Problem: Safety - Adult  Goal: Free from fall injury  12/6/2024 1216 by Lakeisha Aguila RN  Outcome: Progressing  12/5/2024 2303 by Elissa Becker RN  Outcome: Progressing     Problem: Pain  Goal: Verbalizes/displays adequate comfort level or baseline comfort level  12/6/2024 1216 by Lakeisha Aguila RN  Outcome: Progressing  12/5/2024 2303 by Elissa Becker RN  Outcome: Progressing     Problem: Skin/Tissue Integrity  Goal: Absence of new skin breakdown  Description: 1.  Monitor for areas of redness and/or skin breakdown  2.  Assess vascular access sites hourly  3.  Every 4-6 hours minimum:  Change oxygen saturation probe site  4.  Every 4-6 hours:  If on nasal continuous positive airway pressure, respiratory therapy assess nares and determine need for appliance change or resting period.  12/6/2024 1216 by Lakeisha Aguila RN  Outcome: Progressing  12/5/2024 2303 by Elissa Becker RN  Outcome:  Long, Elissa, RN  Outcome: Progressing     Problem: Gastrointestinal - Adult  Goal: Minimal or absence of nausea and vomiting  12/5/2024 2303 by Elissa Becker RN  Outcome: Progressing  Goal: Maintains or returns to baseline bowel function  12/5/2024 2303 by Elissa Becker RN  Outcome: Progressing  Goal: Maintains adequate nutritional intake  12/5/2024 2303 by Elissa Becker RN  Outcome: Progressing  Goal: Establish and maintain optimal ostomy function  12/5/2024 2303 by Elissa Becker RN  Outcome: Progressing     Problem: Genitourinary - Adult  Goal: Absence of urinary retention  12/5/2024 2303 by Elissa Becker RN  Outcome: Progressing  Goal: Urinary catheter remains patent  12/5/2024 2303 by Elissa Becker RN  Outcome: Progressing     Problem: Infection - Adult  Goal: Absence of infection at discharge  12/5/2024 2303 by Elissa Becker RN  Outcome: Progressing  Goal: Absence of infection during hospitalization  12/5/2024 2303 by Elissa Becker RN  Outcome: Progressing  Goal: Absence of fever/infection during anticipated neutropenic period  12/5/2024 2303 by Elissa Becker RN  Outcome: Progressing     Problem: Metabolic/Fluid and Electrolytes - Adult  Goal: Electrolytes maintained within normal limits  12/5/2024 2303 by Elissa Becker RN  Outcome: Progressing  Goal: Hemodynamic stability and optimal renal function maintained  12/5/2024 2303 by Elissa Becker RN  Outcome: Progressing  Goal: Glucose maintained within prescribed range  12/5/2024 2303 by Elissa Becker RN  Outcome: Progressing     Problem: Hematologic - Adult  Goal: Maintains hematologic stability  12/5/2024 2303 by Elissa Becker RN  Outcome: Progressing

## 2024-12-06 NOTE — PROGRESS NOTES
Pharmacist Review and Automatic Dose Adjustment of Prophylactic Enoxaparin         The reviewing pharmacist has made an adjustment to the ordered enoxaparin dose or converted to UFH per the approved [facility] protocol and table as identified below.        Kristine Ramirez is a 49 y.o. female.     Recent Labs     12/05/24  1640 12/05/24  1821   CREATININE 4.7* 4.8*       Estimated Creatinine Clearance: 14 mL/min (A) (based on SCr of 4.8 mg/dL (H)).    Recent Labs     12/05/24  1821 12/06/24  0522   HGB 12.0 11.4*   HCT 38.1 35.6*    180     Recent Labs     12/05/24  1641   INR 1.14       Height:   Ht Readings from Last 1 Encounters:   12/05/24 1.702 m (5' 7.01\")     Weight:  Wt Readings from Last 1 Encounters:   12/06/24 70.9 kg (156 lb 4.9 oz)               Plan: Based upon the patient's weight and renal function    Ordered: Enoxaparin 40mg SUBQ Daily    Changed/converted to    New Order: Heparin 5,000 units SUBQ TID      Thank you,  Jenn Givens [facility]  12/6/2024, 7:11 AM

## 2024-12-06 NOTE — CARE COORDINATION
Case Management Assessment  Initial Evaluation    Date/Time of Evaluation: 12/6/2024 4:20 PM  Assessment Completed by: Brenda Elias    If patient is discharged prior to next notation, then this note serves as note for discharge by case management.    Patient Name: Kristine Ramirez                   YOB: 1975  Diagnosis: Stroke-like symptom [R29.90]  AMS (altered mental status) [R41.82]                   Date / Time: 12/5/2024  4:46 PM    Patient Admission Status: Inpatient   Readmission Risk (Low < 19, Mod (19-27), High > 27): Readmission Risk Score: 19.9    Current PCP: Damon Mireles MD  PCP verified by CM? No    Chart Reviewed: Yes      History Provided by: Patient, Medical Record  Patient Orientation: Alert and Oriented    Patient Cognition: Alert    Hospitalization in the last 30 days (Readmission):  No    If yes, Readmission Assessment in  Navigator will be completed.    Advance Directives:      Code Status: Full Code   Patient's Primary Decision Maker is: Legal Next of Kin    Primary Decision Maker: Kannan Ramirez - Spouse - [phone]    Discharge Planning:    Patient lives with: Spouse/Significant Other Type of Home: House, Other (Comment) (handicap condo)  Primary Care Giver: Self  Patient Support Systems include: Spouse/Significant Other   Current Financial resources: Medicare, Medicaid  Current community resources: ECF/Home Care  Current services prior to admission: Other (Comment) (OT)            Current DME:              Type of Home Care services:  OT    ADLS  Prior functional level: Independent in ADLs/IADLs  Current functional level: Assistance with the following:, Bathing, Dressing, Housework, Shopping    PT AM-PAC: 19 /24  OT AM-PAC: 18 /24    Family can provide assistance at DC: Yes  Would you like Case Management to discuss the discharge plan with any other family members/significant others, and if so, who? No  Plans to Return to Present Housing: Yes  Other Identified

## 2024-12-06 NOTE — CONSULTS
Clinical Pharmacy Progress Note  Medication History     Admit Date: 12/5/2024    Pharmacy consulted to verify home medication list by Dr Daly.    List of of current medications patient is taking is complete. Home Medication list in EPIC updated to reflect changes noted below.    Source of information: patient interview at bedside, pharmacy fills, [facility]    Patient's home pharmacy: [facility], has used [facility]      Changes made to medication list:   Medications removed:   Amlodipine 5mg daily - instructed to stop  Cyclobenzaprine 5mg prn - removed, but pt states she has a small supply at home that she used to take when cramping with dialysis  Edarbi 80mg daily - instructed to stop  Ergocalciferol 50k weekly x 6 doses - completed.  Heparin SQ three times weekly with HD - not a home med, will remove  Nebivolol 5mg daily - instructed to stop  Medication doses adjusted:   Lantus pen - changed to PRN  -- pt states she has not needed this in months, but keeps a small supply in case she has elevated BG at bedtime.  Torsemide - changed from 20mg BID to Two 20mg tabs daily   Sevelamer 800mg tablets TID changed to 2.4g packets TID with meals  Anoro Ellipta - changed from daily to prn, as pt states she only takes this when she has respiratory symptoms  Other notes:   Pantoprazole 40mg BID - prescribed and filled on 10/28 for 15 day supply. Pt is now out, but would like to keep taking this.  PS sent to ICU team.     Current Outpatient Medications   Medication Instructions    albuterol sulfate HFA (PROVENTIL;VENTOLIN;PROAIR) 108 (90 Base) MCG/ACT inhaler 2 puffs, Inhalation, EVERY 6 HOURS PRN    ALPRAZolam (ALPRAZOLAM XR) 2 mg, Oral, EVERY MORNING    aspirin 81 mg, Oral, DAILY    busPIRone (BUSPAR) 10 mg, Oral, 2 TIMES DAILY    cloNIDine (CATAPRES) 0.2 MG tablet TAKE 1 TABLET BY MOUTH EVERY MORNING , THEN TAKE 1 TABLET BY MOUTH AT NOON THEN TAKE 1 TABLET BY MOUTH EVERY NIGHT AT BEDTIME    desvenlafaxine succinate  (PRISTIQ) 25 mg, Oral, DAILY    Dulaglutide (TRULICITY) 3 MG/0.5ML SOAJ INJECT 3 MG UNDER THE SKIN ONCE WEEKLY    Handicap Placard MISC Does not apply, Expires on 5/18/2028    Lantus SoloStar 5 Units, SubCUTAneous, NIGHTLY PRN    levalbuterol (XOPENEX HFA) 45 MCG/ACT inhaler 1 puff, Inhalation, EVERY 8 HOURS PRN    pantoprazole (PROTONIX) 40 mg, Oral, 2 TIMES DAILY BEFORE MEALS    pregabalin (LYRICA) 75 mg, Oral, DAILY    sevelamer (RENVELA) 2.4 g, Oral, 3 TIMES DAILY WITH MEALS    torsemide (DEMADEX) 40 mg, Oral, DAILY    umeclidinium-vilanterol (ANORO ELLIPTA) 62.5-25 MCG/ACT inhaler 1 puff, Inhalation, DAILY PRN       Please call with any questions.  Ias Solomon PharmD., BCPS   12/6/2024 2:45 PM  Wireless: [phone]

## 2024-12-06 NOTE — PLAN OF CARE
Problem: Chronic Conditions and Co-morbidities  Goal: Patient's chronic conditions and co-morbidity symptoms are monitored and maintained or improved  Outcome: Progressing     Problem: Discharge Planning  Goal: Discharge to home or other facility with appropriate resources  Outcome: Progressing     Problem: Safety - Adult  Goal: Free from fall injury  Outcome: Progressing     Problem: Pain  Goal: Verbalizes/displays adequate comfort level or baseline comfort level  Outcome: Progressing     Problem: Skin/Tissue Integrity  Goal: Absence of new skin breakdown  Description: 1.  Monitor for areas of redness and/or skin breakdown  2.  Assess vascular access sites hourly  3.  Every 4-6 hours minimum:  Change oxygen saturation probe site  4.  Every 4-6 hours:  If on nasal continuous positive airway pressure, respiratory therapy assess nares and determine need for appliance change or resting period.  Outcome: Progressing     Problem: ABCDS Injury Assessment  Goal: Absence of physical injury  Outcome: Progressing     Problem: Neurosensory - Adult  Goal: Achieves stable or improved neurological status  Outcome: Progressing  Goal: Absence of seizures  Outcome: Progressing  Goal: Remains free of injury related to seizures activity  Outcome: Progressing  Goal: Achieves maximal functionality and self care  Outcome: Progressing     Problem: Respiratory - Adult  Goal: Achieves optimal ventilation and oxygenation  Outcome: Progressing     Problem: Cardiovascular - Adult  Goal: Maintains optimal cardiac output and hemodynamic stability  Outcome: Progressing  Goal: Absence of cardiac dysrhythmias or at baseline  Outcome: Progressing     Problem: Skin/Tissue Integrity - Adult  Goal: Skin integrity remains intact  Outcome: Progressing  Goal: Incisions, wounds, or drain sites healing without S/S of infection  Outcome: Progressing  Goal: Oral mucous membranes remain intact  Outcome: Progressing     Problem: Musculoskeletal - Adult  Goal:

## 2024-12-06 NOTE — PROGRESS NOTES
health and home with her all the time. Pt receiving OT/PT to improve core strength/mobility    Objective  Vision  Vision: Impaired  Vision Exceptions: Legally blind  Hearing  Hearing: Within functional limits       Observation/Palpation  Observation: RUE fistual, PICC  Safety Devices  Type of Devices: Call light within reach;Chair alarm in place;Left in chair;Nurse notified  Bed Mobility Training  Bed Mobility Training: Yes  Scooting: Stand-by assistance  Balance  Sitting: Intact  Standing: High guard (CGA at RW)  Transfer Training  Transfer Training: Yes  Sit to Stand: Contact-guard assistance (Pt required increased effort to stand from lower chair with arms)  Stand to Sit: Contact-guard assistance (Pt with uncontrolled descent during first stand>sit, able to correct during second attempt with verbal cues)  Bed to Chair: Contact-guard assistance (amb using RW)  Toilet Transfer: Contact-guard assistance     AROM: Within functional limits  Strength:  (WNL LUE, unable to test RUE due to fistula)  Coordination: Within functional limits  Tone: Normal  Sensation:  (Pt reporting decreased sensation below BLE knees)  ADL  Feeding: Setup  Feeding Skilled Clinical Factors: Pt eating breakfast upon arrival without assist  Grooming: Independent  Grooming Skilled Clinical Factors: Pt put hair in pony tail. Pt declining to complete oral hygiene  Putting On/Taking Off Footwear: Setup  Putting On/Taking Off Footwear Skilled Clinical Factors: to doff/don socks  Toileting: Contact guard assistance  Toileting Skilled Clinical Factors: Pt reporting only urinating once every few days, declined need at this time. Anticipate pt can management clothing with CGA  Functional Mobility: Contact guard assistance  Functional Mobility Skilled Clinical Factors: Pt ambulates short distances in room and into hallway ~50' with RW and CGA  Product Used : N/A              Vision  Vision:  (legally blind)  Hearing  Hearing: Within functional  limits  Cognition  Overall Cognitive Status: WNL  Orientation  Overall Orientation Status: Within Functional Limits                  Education Given To: Patient  Education Provided: Role of Therapy;Plan of Care;Transfer Training;Mobility Training  Education Method: Verbal  Barriers to Learning: None  Education Outcome: Continued education needed;Verbalized understanding       AM-PAC - ADL  AM-PAC Daily Activity - Inpatient   How much help is needed for putting on and taking off regular lower body clothing?: A Little  How much help is needed for bathing (which includes washing, rinsing, drying)?: A Lot  How much help is needed for toileting (which includes using toilet, bedpan, or urinal)?: A Little  How much help is needed for putting on and taking off regular upper body clothing?: A Little  How much help is needed for taking care of personal grooming?: A Little  How much help for eating meals?: None  AM-PAC Inpatient Daily Activity Raw Score: 18  AM-PAC Inpatient ADL T-Scale Score : 38.66  ADL Inpatient CMS 0-100% Score: 46.65  ADL Inpatient CMS G-Code Modifier : CK    Goals  Short Term Goals  Time Frame for Short Term Goals: d/c  Short Term Goal 1: Pt completes standing ADL ~3 mins with SBA  Short Term Goal 2: Pt completes toilet transfer with SBA using LRAD  Short Term Goal 3: Pt completes LB dressing with SBA      Therapy Time   Individual Concurrent Group Co-treatment   Time In 0940         Time Out 1018         Minutes 38         Timed Code Treatment Minutes: 23 Minutes (+15 min eval)  Total Treatment Minutes: 38 Minutes       Dillon Villafuerte, OT

## 2024-12-06 NOTE — PROGRESS NOTES
Treatment time: 3 hour; 30 minutes    Net UF: 4000 ml    Pre weight: 72.6 kg  Post weight: 68.6 kg  EDW: TBD    Access used: Rt. Chest wall tunneled CVC  Access function:   Well  Maintained  throughout treatment    Medications or blood products given: NA    Regular outpatient schedule:   [facility]    Summary of response to treatment: Patient has completed 3.5 hour hemodialysis treatment. She tolerated treatment well and was able to remove 4000 ml of fluid. Report given to Lakeisha Aguila RN      Copy of dialysis treatment record placed in chart, to be scanned into EMR.

## 2024-12-06 NOTE — PROGRESS NOTES
Speech-Language Pathology    Received evaluation order, reviewed chart; noted MRI nil acute. Spoke with patient bedside as she was eating breakfast; she denies any new difficulty with swallowing/talking, tolerating diet well, reports she feels back to baseline, and politely declined evaluation. Will sign-off; please re-refer if new concerns arise.    Roro Ocasio, SLP, [id number]  Ph. # [phone]

## 2024-12-06 NOTE — PROGRESS NOTES
Ph: [phone], Fax: [phone]           [facility]               Reason for admission:                 Respiratory distress    Brief Summary :     Kristine Ramirez is being seen by nephrology for respiratory distress needing intubation at the endoscopy suite.      Interval History and plan:      Extubated  Labs better      Plan:   dialysis Monday Wednesday Friday  Tolerated treatment well yesterday   Follow with Hemodynamics   Volume better                    Assessment :     Hyperkalemia     Potassium 5.8 at the time of consult      ESRD: on hemodialysis  schedule: Monday Wednesday Friday  Volume status-   Hb-11.4  Phosphorus-   Access-     Hypertension  BP: (141-184)/(77-96)  Pulse:  []   Goal around 130-140 systolic                         [facility] would like to thank Marck Ruiz MD   for opportunity to serve this patient      Please call with questions at-   24 Hrs Answering service [phone] or  7 am- 5 pm via Perfect serve or cell phone  Dr.Farhan IRA Carr MD       HPI :     Kristine Ramirez is a 49 y.o. female presented to   the hospital on 12/5/2024 with known history of type 2 diabetes, hypertension ESRD, HOLGER, panic attack, COPD presented from the endoscopy room after agonal breathing and desaturation to 70s and 80s.  Patient got intubated and was transferred to ICU.  She was able to be extubated in ICU  She is known to have ESRD followed by Dr. Viviana Hopkins and gets dialysis Monday Wednesday Friday    PMH/PSH/SH/Family History:     Past Medical History:   Diagnosis Date    Acute respiratory failure due to COVID-19 10/16/2021    Arterial ischemic stroke, ICA, left, acute (HCC)     Blind in both eyes     Cerebral artery occlusion with cerebral infarction (HCC)     CHF (congestive heart failure) (HCC)     Chronic kidney disease     COPD (chronic obstructive pulmonary disease) (HCC)     Depression     Diabetes mellitus out of control     Diabetes mellitus, type II

## 2024-12-06 NOTE — PROGRESS NOTES
Sys  then 179 consecutively. NCC NP made aware. Received verbal orders for BP sys goal below 220.

## 2024-12-06 NOTE — CONSULTS
hyperkalemia.  Not a candidate for MRA due to hyperkalemia.  Not a candidate for SGLT2 inhibitor due to renal failure.  Currently not starting beta-blocker due to COPD, patient is postextubation.  Will start hydralazine.  Can consider addition of nitrate as outpatient.  Uptitrate hydralazine as tolerated.  Start Toprol-XL as outpatient if possible.  Lexiscan stress test as outpatient.  Had 70% RCA disease with negative FFR in 2021.  Patient requesting follow-up closer to [facility].  Will arrange for follow-up.  Okay to discharge from cardiac standpoint.        Thank you for allowing to us to participate in the care or Kristine Ramirez. Further evaluation will be based upon the patient's clinical course and testing results.    I have spent 55 minutes of face to face time with the patient with more than 50% spent counseling and coordinating care.     All questions and concerns were addressed to the patient/family. Alternatives to my treatment were discussed. The note was completed using EMR. Every effort was made to ensure accuracy; however, inadvertent computerized transcription errors may be present. I have personally reviewed the reports and images of labs, radiological studies, cardiac studies including ECG's and telemetry, current and old medical records.     Electronically signed by Chilo Hall MD on 12/6/24 at 4:00 PM EST

## 2024-12-06 NOTE — PROGRESS NOTES
Received call from lab that renal panel blood draw was hemolyzed. Lab to send phlebotomist for redraw.

## 2024-12-06 NOTE — PROGRESS NOTES
Pt alert and oriented x 4.     VSS. NSR, BP sys 170's. Afebrile. 2L NC, sats .     NIHSS score 1. GSC 15. Follows all commands, slight weakness in RUE from baseline in previous stroke, otherwise equal bilateral strength in lower extremities.     Pupillometer used for assesment.   NPI R 0.7 L 0 (Pt reports complete blindness in L eye)   Size R 5.24 L 5.33 (Pt reports partial blindness in R eye)     Pt denies generalized pain/N/V.      Fall and seizure precautions in place.

## 2024-12-06 NOTE — PROGRESS NOTES
Dialysis nurse, Srinivasa, got patient up from chair, without this RN present, to use the standing scale. Srinivasa states he turned the chair alarm off before getting the patient up. He left the room to return the scale as the patient was still standing up from the chair. The patient fell to her knees. This RN notified of event by nearby ICU RN. This RN to patient's room. Per patient, she did not hit her head. Neuro exam is unchanged. Patient is A&Ox4. Dr. Ruiz notified.

## 2024-12-06 NOTE — CONSULTS
Ph: [phone], Fax: [phone]           [facility]               Reason for admission:                 Respiratory distress    Brief Summary :     Kristine Ramirez is being seen by nephrology for respiratory distress needing intubation at the endoscopy suite.      Interval History and plan:      Extubated  Potassium high      Plan:  Will do dialysis today and Monday Wednesday Friday  Orders placed  Use 2K bath since potassium is high  DW RN                    Assessment :     Hyperkalemia     Potassium 5.8 at the time of consult      ESRD: on hemodialysis  schedule: Monday Wednesday Friday  Volume status-   Hb-11.4  Phosphorus-   Access-     Hypertension  BP: (141-171)/(77-97)  Pulse:  [88-95]   Goal around 130-140 systolic                         [facility] would like to thank Marck Ruiz MD   for opportunity to serve this patient      Please call with questions at-   24 Hrs Answering service [phone] or  7 am- 5 pm via Perfect serve or cell phone  Dr.Sudhir Noy MD       HPI :     Kristine Ramirez is a 49 y.o. female presented to   the hospital on 12/5/2024 with known history of type 2 diabetes, hypertension ESRD, HOLGER, panic attack, COPD presented from the endoscopy room after agonal breathing and desaturation to 70s and 80s.  Patient got intubated and was transferred to ICU.  She was able to be extubated in ICU  She is known to have ESRD followed by Dr. Viviana Hopkins and gets dialysis Monday Wednesday Friday    PMH/PSH/SH/Family History:     Past Medical History:   Diagnosis Date    Acute respiratory failure due to COVID-19 10/16/2021    Arterial ischemic stroke, ICA, left, acute (HCC)     Blind in both eyes     Cerebral artery occlusion with cerebral infarction (HCC)     CHF (congestive heart failure) (HCC)     Chronic kidney disease     COPD (chronic obstructive pulmonary disease) (HCC)     Depression     Diabetes mellitus out of control     Diabetes mellitus, type II  300 mg, 300 mg, Rectal, Daily  labetalol (NORMODYNE;TRANDATE) injection 10 mg, 10 mg, IntraVENous, Q10 Min PRN  polyethylene glycol (GLYCOLAX) packet 17 g, 17 g, Oral, Daily PRN  albuterol (PROVENTIL) (2.5 MG/3ML) 0.083% nebulizer solution 2.5 mg, 2.5 mg, Nebulization, Q6H PRN  busPIRone (BUSPAR) tablet 10 mg, 10 mg, Oral, BID  cyclobenzaprine (FLEXERIL) tablet 5 mg, 5 mg, Oral, Daily PRN  venlafaxine (EFFEXOR XR) extended release capsule 37.5 mg, 37.5 mg, Oral, Daily with breakfast    Vitals :     Vitals:    12/06/24 1210   BP: (!) 141/77   Pulse:    Resp:    Temp:    SpO2:           Physical Examination :     appearance: Alert, orientated, sitting up  Respiratory: no distress  Cardiovascular: no visibly  raised JVD, Edema none  Abdomen: -  soft  Other relevant findings: -      Labs :     CBC:   Recent Labs     12/05/24  1641 12/05/24  1821 12/06/24  0522   WBC 5.5 5.3 6.6   HGB 12.2 12.0 11.4*   HCT 38.1 38.1 35.6*    189 180     BMP:    Recent Labs     12/05/24  1640 12/05/24  1821    138   K 5.6* 5.8*   CL 94* 95*   CO2 25 26   BUN 52* 54*   CREATININE 4.7* 4.8*   GLUCOSE 169* 156*   MG  --  1.95     Lab Results   Component Value Date/Time    COLORU YELLOW 02/20/2022 01:35 PM    NITRU Negative 02/20/2022 01:35 PM    GLUCOSEU Negative 02/20/2022 01:35 PM    KETUA Negative 02/20/2022 01:35 PM    UROBILINOGEN 0.2 02/20/2022 01:35 PM    BILIRUBINUR Negative 02/20/2022 01:35 PM        ----------------------------------------------------------  Please call with questions at      24 Hrs Answering service [phone]  Perfect serve, or cell phone 7 am - 5pm  Fausto Villafuerte MD  [email or web address]

## 2024-12-06 NOTE — CONSULTS
Clinical Pharmacy Consult Note  Medication History     Admit Date: 12/5/2024    Pharmacy consulted to verify home medication list by Dr. Daly.    List of current medications patient is taking is incomplete.    Source of information: Dispense history     Patient's home pharmacy: [facility] ([phone])     Changes made to medication list:   Medications removed: (include reason, ex: therapy completed, patient no longer taking, etc.)  None  Medications added:   Amlodipine 5 mg, filled 90 day supply 10/22/24  Medication doses adjusted:   None  Other notes:   Was unable to interview pt, med rec is based off dispense history and chart review notes  Edarbi 80 mg: filled March 2024  Nebivolol 5 mg: filled April 2024  Pantoprazole 40 mg: filled 15 days supply 10/28/24, could be OTC  Rizatriptan 10 mg: filled in 2023  Sevelamer 2.4 g packet: filled 30 days supply 10/23/24  Torsemide 20 mg: filled 30 days supply 10/26/24  Anoro Ellipta Inhaler: filled April 2024    Current Outpatient Medications   Medication Instructions    albuterol sulfate HFA (PROVENTIL;VENTOLIN;PROAIR) 108 (90 Base) MCG/ACT inhaler 2 puffs, Inhalation, EVERY 6 HOURS PRN    ALPRAZolam (ALPRAZOLAM XR) 2 mg, Oral, EVERY MORNING    aspirin 81 mg, Oral, DAILY    busPIRone (BUSPAR) 10 mg, Oral, 2 TIMES DAILY    cloNIDine (CATAPRES) 0.2 MG tablet TAKE 1 TABLET BY MOUTH EVERY MORNING , THEN TAKE 1 TABLET BY MOUTH AT NOON THEN TAKE 1 TABLET BY MOUTH EVERY NIGHT AT BEDTIME    cyclobenzaprine (FLEXERIL) 5 mg, Oral, DAILY PRN    desvenlafaxine succinate (PRISTIQ) 25 mg, Oral, DAILY    Dulaglutide (TRULICITY) 3 MG/0.5ML SOAJ INJECT 3 MG UNDER THE SKIN ONCE WEEKLY    EDARBI 80 MG TABS 1 tablet, Oral, DAILY    Handicap Placard MISC Does not apply, Expires on 5/18/2028    heparin (porcine) 5,000 Units, SubCUTAneous, THREE TIMES WEEKLY (MONDAY, WEDNESDAY, FRIDAY), For dialysis     insulin glargine (LANTUS;BASAGLAR) 5 Units, SubCUTAneous, NIGHTLY    [employer] PEN  NEEDLES 31G 31G X 8 MM MISC No dose, route, or frequency recorded.    levalbuterol (XOPENEX HFA) 45 MCG/ACT inhaler 1 puff, Inhalation, EVERY 8 HOURS PRN    nebivolol (BYSTOLIC) 5 mg, Oral, DAILY    pantoprazole (PROTONIX) 40 mg, Oral, 2 TIMES DAILY BEFORE MEALS    pregabalin (LYRICA) 75 mg, Oral, DAILY    rizatriptan (MAXALT) 10 mg, Oral, ONCE PRN, May repeat in 2 hours if needed    sevelamer (RENVELA) 800 mg, Oral, 3 TIMES DAILY WITH MEALS    torsemide (DEMADEX) 20 mg, Oral, 2 times daily    umeclidinium-vilanterol (ANORO ELLIPTA) 62.5-25 MCG/ACT inhaler INHALE 1 DOSE BY MOUTH DAILY    Vitamin D (Ergocalciferol) 50,000 Units, Oral, WEEKLY       Thank you for consulting pharmacy,    Soledad Mendoza  PharmD Candidate 2027

## 2024-12-06 NOTE — PROGRESS NOTES
Physical Therapy  Facility/Department: [facility] ICU  Physical Therapy Initial Assessment and Treatment    Name: Kristine Ramirez  : 1975  MRN: [id number]  Date of Service: 2024    Discharge Recommendations:  24 hour supervision or assist   PT Equipment Recommendations  Equipment Needed: No      Patient Diagnosis(es): The primary encounter diagnosis was Cerebrovascular accident (CVA), unspecified mechanism (HCC). A diagnosis of Cerebrovascular accident (CVA) due to occlusion of right vertebral artery (HCC) was also pertinent to this visit.  Past Medical History:  has a past medical history of Acute respiratory failure due to COVID-19, Arterial ischemic stroke, ICA, left, acute (HCC), Blind in both eyes, Cerebral artery occlusion with cerebral infarction (HCC), CHF (congestive heart failure) (HCC), Chronic kidney disease, COPD (chronic obstructive pulmonary disease) (HCC), Depression, Diabetes mellitus out of control, Diabetes mellitus, type II (HCC), Diabetic neuropathy associated with type 2 diabetes mellitus (HCC), Generalized headaches, Hypertension, Infertility, Insomnia, Migraine headache, Mixed hyperlipidemia, Otitis media, Pelvic abscess in female, Pneumonia, Stroke (HCC), and Stroke (HCC).  Past Surgical History:  has a past surgical history that includes Cervix surgery; eye surgery; Foot surgery (Right); Foot surgery (Bilateral); Foot surgery (Left); IR TUNNELED CVC PLACE WO SQ PORT/PUMP > 5 YEARS (2021); Dialysis fistula creation (Right, 2/10/2022); Upper gastrointestinal endoscopy (N/A, 2024); and Upper gastrointestinal endoscopy (N/A, 2024).    Assessment  Body Structures, Functions, Activity Limitations Requiring Skilled Therapeutic Intervention: Decreased functional mobility ;Decreased endurance;Decreased strength  Assessment: Pt with slightly decreased independent mobility but likely close to baseline function.  Pt reports normally independent with mobility with RW prn in house and  Mobility - Inpatient   How much help is needed turning from your back to your side while in a flat bed without using bedrails?: None  How much help is needed moving from lying on your back to sitting on the side of a flat bed without using bedrails?: A Little  How much help is needed moving to and from a bed to a chair?: A Little  How much help is needed standing up from a chair using your arms?: A Little  How much help is needed walking in hospital room?: A Little  How much help is needed climbing 3-5 steps with a railing?: A Little  AM-PAC Inpatient Mobility Raw Score : 19  AM-PAC Inpatient T-Scale Score : 45.44  Mobility Inpatient CMS 0-100% Score: 41.77  Mobility Inpatient CMS G-Code Modifier : CK         Tinneti Score       Goals  Short Term Goals  Time Frame for Short Term Goals: By discharge  Short Term Goal 1: Sit to stand supervision  Short Term Goal 2: Pt will amb >150' with RW supervision       Education  Patient Education  Education Given To: Patient  Education Provided: Role of Therapy;Plan of Care  Education Provided Comments: importance of OOB, activity at home  Education Method: Verbal  Education Outcome: Verbalized understanding;Continued education needed      Therapy Time   Individual Concurrent Group Co-treatment   Time In 0939         Time Out 1017         Minutes 38             Timed Code Treatment Minutes:23       Total Treatment Minutes:  38      Juli Mcrae, PT

## 2024-12-06 NOTE — PLAN OF CARE
Brief note:  49F with PMH of ESRD on HD, stroke with RUE weakness, HTN not on meds who was found unresponsive after the procedure.   CTA shows R VA occlusion, previous CTA in 2022 showed was unremarkable. Anaethesia record showed that her SBP and MAP were in the 75 and 60 between 300-315 before she was found unresponsive around 330. Overall, her symptoms could likely be explained by hypoperfusion in the posterior circulation in the setting of R VA occlusion     No complaints at time of exam.   Reports chronic numbness in both feet and right hand  Also reports decreased sensation to light touch on the right face but states this is chronic as well.       PHYSICAL EXAM:  Vitals:    12/05/24 1945 12/05/24 2000 12/05/24 2015 12/05/24 2100   BP: (!) 179/104 (!) 186/97 (!) 181/94 (!) 182/97   Pulse: 93 93 93 94   Resp: 12 19 13 14   Temp:  96.9 °F (36.1 °C)     TempSrc:  Axillary     SpO2: 95% 92% (!) 89% 96%         General: Alert, no distress, well-nourished  Neurologic  Mental status:   orientation to person, place, time, situation   Attention intact as able to attend well to the exam     Language fluent in conversation   Comprehension intact; follows simple commands    Cranial nerves:   CN2: Patient legally blind in left eye.   CN 3,4,6: Pupils 5 mm equal and reactive to light, extraocular muscles intact, very slight dysconjugate gaze.   CN5: Facial sensation symmetric   CN7: Face symmetric  CN8: Hearing symmetric to spoken voice  CN9: Palate elevated symmetrically  CN11: Traps full strength on shoulder shrug  CN12: Tongue midline with protrusion    Motor Exam:  Slight drift in the RUE and RLE. Patient has baseline right sided weakness from previous stroke per patient.    R  L    Deltoid 4  5   Biceps 4 5   Triceps 4 5   Wrist extension  4 5   Interossei 4 5      R  L    Hip flexion  4  5   Hip extension  4 5   Knee flexion  4 5   Knee extension  4 5   Ankle dorsiflexion  4 5   Ankle plantar flexion  4 5       Sensory:  light touch intact and symmetric in all 4 extremities.  No sensory extinction on bilateral simultaneous stimulation  Cerebellar/coordination: No dysdiadochokinesias.   Tone: normal in all 4 extremities  Gait: Deferred for safety    OTHER SYSTEMS:  Cardiovascular: Warm, appears well perfused   Respiratory: Easy, non-labored respiratory pattern   Abdominal: Abdomen is without distention   Extremities: Upper and lower extremities are atraumatic in appearance without deformity. No swelling or erythema.

## 2024-12-07 VITALS
HEIGHT: 67 IN | BODY MASS INDEX: 24.08 KG/M2 | HEART RATE: 98 BPM | RESPIRATION RATE: 16 BRPM | OXYGEN SATURATION: 98 % | DIASTOLIC BLOOD PRESSURE: 90 MMHG | TEMPERATURE: 97.5 F | WEIGHT: 153.44 LBS | SYSTOLIC BLOOD PRESSURE: 170 MMHG

## 2024-12-07 PROBLEM — I10 PRIMARY HYPERTENSION: Status: ACTIVE | Noted: 2024-12-07

## 2024-12-07 LAB
ALBUMIN SERPL-MCNC: 4 G/DL (ref 3.4–5)
ANION GAP SERPL CALCULATED.3IONS-SCNC: 16 MMOL/L (ref 3–16)
BASOPHILS # BLD: 0 K/UL (ref 0–0.2)
BASOPHILS NFR BLD: 0.5 %
BUN SERPL-MCNC: 37 MG/DL (ref 7–20)
CALCIUM SERPL-MCNC: 8.6 MG/DL (ref 8.3–10.6)
CHLORIDE SERPL-SCNC: 89 MMOL/L (ref 99–110)
CO2 SERPL-SCNC: 25 MMOL/L (ref 21–32)
CREAT SERPL-MCNC: 3.7 MG/DL (ref 0.6–1.1)
DEPRECATED RDW RBC AUTO: 17.8 % (ref 12.4–15.4)
EOSINOPHIL # BLD: 0.1 K/UL (ref 0–0.6)
EOSINOPHIL NFR BLD: 1.1 %
GFR SERPLBLD CREATININE-BSD FMLA CKD-EPI: 14 ML/MIN/{1.73_M2}
GLUCOSE BLD-MCNC: 157 MG/DL (ref 70–99)
GLUCOSE BLD-MCNC: 171 MG/DL (ref 70–99)
GLUCOSE SERPL-MCNC: 140 MG/DL (ref 70–99)
HCT VFR BLD AUTO: 38.1 % (ref 36–48)
HGB BLD-MCNC: 12.3 G/DL (ref 12–16)
LYMPHOCYTES # BLD: 1 K/UL (ref 1–5.1)
LYMPHOCYTES NFR BLD: 17.4 %
MAGNESIUM SERPL-MCNC: 1.91 MG/DL (ref 1.8–2.4)
MCH RBC QN AUTO: 29.7 PG (ref 26–34)
MCHC RBC AUTO-ENTMCNC: 32.4 G/DL (ref 31–36)
MCV RBC AUTO: 91.7 FL (ref 80–100)
MONOCYTES # BLD: 0.4 K/UL (ref 0–1.3)
MONOCYTES NFR BLD: 6.9 %
NEUTROPHILS # BLD: 4.2 K/UL (ref 1.7–7.7)
NEUTROPHILS NFR BLD: 74.1 %
PERFORMED ON: ABNORMAL
PERFORMED ON: ABNORMAL
PHOSPHATE SERPL-MCNC: 6 MG/DL (ref 2.5–4.9)
PLATELET # BLD AUTO: 177 K/UL (ref 135–450)
PMV BLD AUTO: 9 FL (ref 5–10.5)
POTASSIUM SERPL-SCNC: 4.6 MMOL/L (ref 3.5–5.1)
RBC # BLD AUTO: 4.15 M/UL (ref 4–5.2)
SODIUM SERPL-SCNC: 130 MMOL/L (ref 136–145)
WBC # BLD AUTO: 5.7 K/UL (ref 4–11)

## 2024-12-07 PROCEDURE — 85025 COMPLETE CBC W/AUTO DIFF WBC: CPT

## 2024-12-07 PROCEDURE — 2580000003 HC RX 258

## 2024-12-07 PROCEDURE — 6370000000 HC RX 637 (ALT 250 FOR IP): Performed by: INTERNAL MEDICINE

## 2024-12-07 PROCEDURE — 80069 RENAL FUNCTION PANEL: CPT

## 2024-12-07 PROCEDURE — 99239 HOSP IP/OBS DSCHRG MGMT >30: CPT | Performed by: INTERNAL MEDICINE

## 2024-12-07 PROCEDURE — 83735 ASSAY OF MAGNESIUM: CPT

## 2024-12-07 PROCEDURE — 6370000000 HC RX 637 (ALT 250 FOR IP)

## 2024-12-07 PROCEDURE — 99232 SBSQ HOSP IP/OBS MODERATE 35: CPT | Performed by: INTERNAL MEDICINE

## 2024-12-07 PROCEDURE — 36415 COLL VENOUS BLD VENIPUNCTURE: CPT

## 2024-12-07 PROCEDURE — 6360000002 HC RX W HCPCS

## 2024-12-07 RX ORDER — TORSEMIDE 20 MG/1
40 TABLET ORAL DAILY
Status: DISCONTINUED | OUTPATIENT
Start: 2024-12-07 | End: 2024-12-07 | Stop reason: HOSPADM

## 2024-12-07 RX ORDER — HYDRALAZINE HYDROCHLORIDE 50 MG/1
50 TABLET, FILM COATED ORAL EVERY 8 HOURS SCHEDULED
Status: DISCONTINUED | OUTPATIENT
Start: 2024-12-07 | End: 2024-12-07 | Stop reason: HOSPADM

## 2024-12-07 RX ORDER — CLONIDINE HYDROCHLORIDE 0.1 MG/1
0.2 TABLET ORAL 3 TIMES DAILY
Status: DISCONTINUED | OUTPATIENT
Start: 2024-12-07 | End: 2024-12-07 | Stop reason: HOSPADM

## 2024-12-07 RX ORDER — HYDRALAZINE HYDROCHLORIDE 50 MG/1
25 TABLET, FILM COATED ORAL ONCE
Status: COMPLETED | OUTPATIENT
Start: 2024-12-07 | End: 2024-12-07

## 2024-12-07 RX ADMIN — HYDRALAZINE HYDROCHLORIDE 25 MG: 50 TABLET ORAL at 12:25

## 2024-12-07 RX ADMIN — ASPIRIN 81 MG: 81 TABLET, CHEWABLE ORAL at 07:46

## 2024-12-07 RX ADMIN — PANTOPRAZOLE SODIUM 40 MG: 40 TABLET, DELAYED RELEASE ORAL at 07:46

## 2024-12-07 RX ADMIN — VENLAFAXINE HYDROCHLORIDE 37.5 MG: 37.5 CAPSULE, EXTENDED RELEASE ORAL at 07:46

## 2024-12-07 RX ADMIN — BUSPIRONE HYDROCHLORIDE 10 MG: 5 TABLET ORAL at 07:46

## 2024-12-07 RX ADMIN — SODIUM CHLORIDE, PRESERVATIVE FREE 10 ML: 5 INJECTION INTRAVENOUS at 07:47

## 2024-12-07 RX ADMIN — CLONIDINE HYDROCHLORIDE 0.2 MG: 0.1 TABLET ORAL at 12:25

## 2024-12-07 RX ADMIN — ALPRAZOLAM 1 MG: 0.5 TABLET ORAL at 07:46

## 2024-12-07 RX ADMIN — HEPARIN SODIUM 5000 UNITS: 5000 INJECTION INTRAVENOUS; SUBCUTANEOUS at 06:06

## 2024-12-07 RX ADMIN — HYDRALAZINE HYDROCHLORIDE 25 MG: 50 TABLET ORAL at 06:06

## 2024-12-07 RX ADMIN — ACETAMINOPHEN 650 MG: 325 TABLET ORAL at 07:46

## 2024-12-07 RX ADMIN — HYDRALAZINE HYDROCHLORIDE 50 MG: 50 TABLET ORAL at 13:59

## 2024-12-07 RX ADMIN — CLOPIDOGREL BISULFATE 75 MG: 75 TABLET ORAL at 07:46

## 2024-12-07 RX ADMIN — TORSEMIDE 40 MG: 20 TABLET ORAL at 12:25

## 2024-12-07 RX ADMIN — HEPARIN SODIUM 5000 UNITS: 5000 INJECTION INTRAVENOUS; SUBCUTANEOUS at 12:26

## 2024-12-07 ASSESSMENT — ENCOUNTER SYMPTOMS
ABDOMINAL DISTENTION: 1
BACK PAIN: 1
NAUSEA: 0
DIARRHEA: 0
ABDOMINAL PAIN: 0
COUGH: 0
CHEST TIGHTNESS: 0
VOMITING: 0
SHORTNESS OF BREATH: 0
WHEEZING: 0
CONSTIPATION: 0
PHOTOPHOBIA: 0

## 2024-12-07 ASSESSMENT — PAIN SCALES - GENERAL
PAINLEVEL_OUTOF10: 0
PAINLEVEL_OUTOF10: 0
PAINLEVEL_OUTOF10: 3
PAINLEVEL_OUTOF10: 3

## 2024-12-07 ASSESSMENT — PAIN - FUNCTIONAL ASSESSMENT: PAIN_FUNCTIONAL_ASSESSMENT: ACTIVITIES ARE NOT PREVENTED

## 2024-12-07 ASSESSMENT — PAIN DESCRIPTION - PAIN TYPE: TYPE: ACUTE PAIN

## 2024-12-07 ASSESSMENT — PAIN DESCRIPTION - LOCATION: LOCATION: BACK

## 2024-12-07 ASSESSMENT — PAIN DESCRIPTION - ONSET: ONSET: ON-GOING

## 2024-12-07 ASSESSMENT — PAIN DESCRIPTION - FREQUENCY: FREQUENCY: CONTINUOUS

## 2024-12-07 ASSESSMENT — PAIN DESCRIPTION - ORIENTATION: ORIENTATION: MID

## 2024-12-07 ASSESSMENT — PAIN DESCRIPTION - DESCRIPTORS: DESCRIPTORS: ACHING

## 2024-12-07 NOTE — PROGRESS NOTES
Patient left AMA with all belongings via wheelchair and lyft. Patients personal wheelchair went with her. Encouraged patient to follow up with her PCP. Right chest vascath remained in as this is patients permanent vascath. AMA paperwork in chart.

## 2024-12-07 NOTE — PROGRESS NOTES
sodium chloride, ondansetron **OR** ondansetron, labetalol, polyethylene glycol, albuterol, cyclobenzaprine    VITALS:   Patient Vitals for the past 8 hrs:   BP Temp Temp src Pulse Resp SpO2   12/07/24 0645 -- -- -- 95 10 100 %   12/07/24 0630 -- -- -- 98 13 --   12/07/24 0615 (!) 179/103 -- -- 98 12 --   12/07/24 0600 -- -- -- 99 15 --   12/07/24 0545 -- -- -- 97 13 --   12/07/24 0530 -- -- -- 97 10 --   12/07/24 0515 -- -- -- 100 18 --   12/07/24 0500 -- -- -- 100 14 --   12/07/24 0445 -- -- -- (!) 101 16 --   12/07/24 0430 -- -- -- 97 12 --   12/07/24 0418 (!) 174/95 98.3 °F (36.8 °C) Temporal 99 13 97 %   12/07/24 0415 -- -- -- 98 16 --   12/07/24 0400 -- -- -- 100 13 --   12/07/24 0345 -- -- -- 99 13 --   12/07/24 0330 -- -- -- (!) 101 14 --   12/07/24 0315 -- -- -- (!) 102 17 --   12/07/24 0300 (!) 184/90 -- -- (!) 104 14 --   12/07/24 0245 -- -- -- 100 (!) 9 --   12/07/24 0230 -- -- -- (!) 102 10 --   12/07/24 0215 (!) 160/86 -- -- 99 14 --   12/07/24 0200 (!) 180/100 -- -- (!) 101 11 --   12/07/24 0145 (!) 172/90 -- -- (!) 102 11 --   12/07/24 0130 (!) 156/83 -- -- 100 11 --   12/07/24 0115 (!) 172/91 -- -- 100 11 --   12/07/24 0100 (!) 175/93 -- -- (!) 101 13 --   12/07/24 0045 133/89 -- -- (!) 101 16 --   12/07/24 0030 (!) 152/77 -- -- (!) 101 11 --   12/07/24 0015 (!) 167/93 -- -- (!) 103 13 --   12/07/24 0000 (!) 168/101 99.1 °F (37.3 °C) Temporal 100 13 99 %   12/06/24 2345 (!) 169/82 -- -- (!) 104 20 --   12/06/24 2330 (!) 155/75 -- -- (!) 104 22 --         Intake/Output Summary (Last 24 hours) at 12/7/2024 0725  Last data filed at 12/6/2024 2200  Gross per 24 hour   Intake 1560 ml   Output --   Net 1560 ml         PHYSICAL EXAM:  Physical Exam  Constitutional:       General: She is not in acute distress.     Appearance: She is normal weight. She is not ill-appearing.   HENT:      Head: Normocephalic and atraumatic.      Right Ear: External ear normal.      Left Ear: External ear normal.      Nose:  daily  Restart home torsemide 50 mg daily  Will need outpatient stress test  Will need outpatient follow-up with cardiology and neurology  Okay to discharge home when systolic blood pressure is 160 or less  If unable to discharge home today she can be downgraded from ICU    D/w Dr. Daja Ruiz MD

## 2024-12-07 NOTE — PROGRESS NOTES
Perfect serve sent to Dr. Durant, notified that patients blood pressure has been high over the last 12 hours, asymptomatic. New orders placed for hydralazine, clonidine and torsemide. Patient is anxious to leave, educated patient that it is important for BP to be under control before discharging.

## 2024-12-07 NOTE — PROGRESS NOTES
[facility]  Cardiology Inpatient Consult Service  Daily Progress Note        Admit Date:  12/5/2024    Referring Physician: Marck Ruiz MD      Assessment & Plan:     New diagnosis of LV systolic dysfunction-no clinical evidence of significant volume overload.  Had dialysis yesterday.  Etiology of cardiomyopathy unclear.  Possibilities include acute toxic cardiomyopathy versus ischemic cardiomyopathy.  Had 70% RCA disease with negative FFR in 2021.  Uncontrolled hypertension  End-stage renal disease  Status post profound hypoxemia post EGD, right vertebral artery occlusion age-indeterminate  Okay for discharge.  Lexiscan stress test as outpatient.  Reassess LVEF on stress test.  Increase hydralazine to 50 mg 3 times a day  Has had diarrhea with Lipitor.  No indication for anticoagulation from cardiac standpoint.  Antiplatelet therapy per neurology.      Subjective:   Interval history:  No new cardiac symptoms.  Wants to go home.    Medications:   hydrALAZINE  50 mg Oral 3 times per day    heparin (porcine)  5,000 Units SubCUTAneous 3 times per day    insulin lispro  0-8 Units SubCUTAneous 4x Daily AC & HS    pantoprazole  40 mg Oral BID    ALPRAZolam  1 mg Oral BID    clopidogrel  75 mg Oral Daily    sodium chloride flush  5-40 mL IntraVENous 2 times per day    sodium chloride flush  5-40 mL IntraVENous 2 times per day    aspirin  81 mg Oral Daily    Or    aspirin  300 mg Rectal Daily    busPIRone  10 mg Oral BID    venlafaxine  37.5 mg Oral Daily with breakfast       IV drips:   sodium chloride Stopped (12/05/24 1712)    sodium chloride      dextrose      sodium chloride         PRN:  sodium chloride flush, sodium chloride, acetaminophen **OR** acetaminophen, glucose, dextrose bolus **OR** dextrose bolus, glucagon (rDNA), dextrose, sodium chloride flush, sodium chloride, ondansetron **OR** ondansetron, labetalol, polyethylene glycol, albuterol, cyclobenzaprine      Objective:      Vitals:    12/07/24 0615 12/07/24 0630 12/07/24 0645 12/07/24 0737   BP: (!) 179/103   (!) 170/87   Pulse: 98 98 95    Resp: 12 13 10    Temp:    97.3 °F (36.3 °C)   TempSrc:    Temporal   SpO2:   100%    Weight:       Height:           Intake/Output Summary (Last 24 hours) at 12/7/2024 0929  Last data filed at 12/6/2024 2200  Gross per 24 hour   Intake 1560 ml   Output --   Net 1560 ml     I/O last 3 completed shifts:  In: 2200 [P.O.:2200]  Out: -   Wt Readings from Last 3 Encounters:   12/06/24 69.6 kg (153 lb 7 oz)   12/05/24 65 kg (143 lb 4.8 oz)   09/26/24 66.9 kg (147 lb 6.4 oz)       Admit Wt: Weight - Scale: 70.9 kg (156 lb 4.9 oz)   Todays Wt: Weight - Scale: 69.6 kg (153 lb 7 oz)    TELEMETRY: Personally interpreted Sinus     Physical Exam:     General:  Awake, alert, NAD  Skin:  Warm and dry  Neck:  No JVD  Chest:  Clear to auscultation, respiration normal  Cardiovascular:  RRR S1S2  Abdomen:  Soft. BS+  Extremities: No edema        Labs: Labs reviewed.  Potassium is 4.6.  Creatinine is 3.7.  Recent Labs     12/05/24  1821 12/06/24  1214 12/07/24  0455    133* 130*   K 5.8* 5.1 4.6   BUN 54* 60* 37*   CREATININE 4.8* 5.3* 3.7*   CL 95* 89* 89*   CO2 26 26 25   GLUCOSE 156* 130* 140*   CALCIUM 8.1* 7.8* 8.6   MG 1.95 1.86  --      Recent Labs     12/05/24  1821 12/06/24  0522 12/07/24  0455   WBC 5.3 6.6 5.7   HGB 12.0 11.4* 12.3   HCT 38.1 35.6* 38.1    180 177   MCV 94.3 93.1 91.7     Recent Labs     12/06/24  0605   TRIG 66   HDL 44   LDL 48     Recent Labs     12/05/24  1641   INR 1.14         Lab Results   Component Value Date/Time    TROPHS 254 12/05/2024 04:40 PM       Imaging:         All questions and concerns were addressed to the patient/family. Alternatives to my treatment were discussed.  I have personally reviewed the reports and images of labs, radiological studies, cardiac studies including ECG's and telemetry, current and old medical records.     I would like to thank you

## 2024-12-07 NOTE — PROGRESS NOTES
Current NIHSS 1    Nursing Core Measures for Stroke:   [x]   Education template documentation (STROKE/TIA). Please select only risk factors that are applicable to patient when selecting risk factors.  [x]   Care Plan template documentation (Physiologic Instability - Neurosensory). Selecting this will add care plan rows to the flowsheet under the Neuro section of Head to Toe.  [x]   Verified Swallow Screen completed prior to PO intake of food, drink, medications  [x]   VTE Prophylaxis: SCDs ordered/addressed; SCDs: Refused           (As a reminder, ASA, Plavix, and TPA/TNK are not VTE prophylaxis.)    Reviewed the Following Education with Patient and/or Family:   - Personalized risk factors for patient, along with changes, modifications that will help prevent stroke.  - Signs and Symptoms of Stroke: (Facial droop, weakness/numbness especially on one side, speech difficulty, sudden confusion, sudden loss of vision, sudden severe headache, sudden loss of balance or having difficulty walking, syncope, or seizure)  - How to activate EMS (911)   - Importance of Follow Up Appointments at Discharge   - Importance of Compliance with Medications Prescribed at Discharge  - Available community resources and stroke advocacy groups if needed    Patient and/or family member: verbalized understanding.     Stroke Education booklet given to patient/family (or verified, if given already), which reviews above information. yes         Electronically signed by Soledad Gupta RN on 12/7/2024 at 10:13 AM

## 2024-12-07 NOTE — PROGRESS NOTES
Patient requesting to leave against medical advice despite education. MD aware. Patient completed AMA form but does not have ride. Supervisor contacted and will be setting up ride service.

## 2024-12-07 NOTE — PLAN OF CARE
Problem: Safety - Adult  Goal: Free from fall injury  12/7/2024 1011 by Soledad Gupta, RN  Outcome: Progressing  12/6/2024 2349 by Elissa Becker RN  Outcome: Progressing   Patient has remained free of falls. 2/4 bed rails up, bed locked and in lowest position, call light within reach. Patient instructed on use of call light and uses appropriately. Bed alarm on. Non-skid footwear and fall band on.       Problem: Pain  Goal: Verbalizes/displays adequate comfort level or baseline comfort level  12/7/2024 1011 by Soledad Gupta, RN  Outcome: Progressing   Numeric pain rating scale being used. Patient repositioned for comfort. Patient is tolerating PO tylenol.

## 2024-12-07 NOTE — PROGRESS NOTES
Current NIHSS 1    Nursing Core Measures for Stroke:   [x]   Education template documentation (STROKE/TIA). Please select only risk factors that are applicable to patient when selecting risk factors.  [x]   Care Plan template documentation (Physiologic Instability - Neurosensory). Selecting this will add care plan rows to the flowsheet under the Neuro section of Head to Toe.  [x]   Verified Swallow Screen completed prior to PO intake of food, drink, medications>          Please verify correct medication route prior to administration for intubated patients, patients who can not swallow or have alternative routes of intake (NG, OG, MN), etc  [x]   VTE Prophylaxis: SCDs ordered/addressed; SCDs: N/A Heparin           (As a reminder, ASA, Plavix, and TPA/TNK are not VTE prophylaxis.)    Reviewed the Following Education with Patient and/or Family:   - Personalized risk factors for patient, along with changes, modifications that will help prevent stroke.  - Signs and Symptoms of Stroke: (Facial droop, weakness/numbness especially on one side, speech difficulty, sudden confusion, sudden loss of vision, sudden severe headache, sudden loss of balance or having difficulty walking, syncope, or seizure)  - How to activate EMS (911)   - Importance of Follow Up Appointments at Discharge   - Importance of Compliance with Medications Prescribed at Discharge  - Available community resources and stroke advocacy groups if needed    Patient and/or family member: verbalized understanding.     Stroke Education booklet given to patient/family (or verified, if given already), which reviews above information. yes     Electronically signed by Elissa Becker RN on 12/7/2024 at 1:28 AM

## 2024-12-07 NOTE — PLAN OF CARE
Problem: Chronic Conditions and Co-morbidities  Goal: Patient's chronic conditions and co-morbidity symptoms are monitored and maintained or improved  12/6/2024 2349 by Elissa Becker RN  Outcome: Progressing  12/6/2024 1216 by Lakeisha Aguila RN  Outcome: Progressing     Problem: Discharge Planning  Goal: Discharge to home or other facility with appropriate resources  12/6/2024 2349 by Elissa Becker RN  Outcome: Progressing  12/6/2024 1216 by Lakeisha Aguila RN  Outcome: Progressing  Flowsheets (Taken 12/5/2024 2304 by Elissa Becker RN)  Discharge to home or other facility with appropriate resources:   Identify barriers to discharge with patient and caregiver   Arrange for needed discharge resources and transportation as appropriate   Identify discharge learning needs (meds, wound care, etc)   Arrange for interpreters to assist at discharge as needed   Refer to discharge planning if patient needs post-hospital services based on physician order or complex needs related to functional status, cognitive ability or social support system    Problem: Pain  Goal: Verbalizes/displays adequate comfort level or baseline comfort level  12/6/2024 2349 by Elissa Becker RN  Outcome: Progressing  12/6/2024 1216 by Lakeisha Aguila RN  Outcome: Progressing     Problem: Skin/Tissue Integrity  Goal: Absence of new skin breakdown  Description: 1.  Monitor for areas of redness and/or skin breakdown  2.  Assess vascular access sites hourly  3.  Every 4-6 hours minimum:  Change oxygen saturation probe site  4.  Every 4-6 hours:  If on nasal continuous positive airway pressure, respiratory therapy assess nares and determine need for appliance change or resting period.  12/6/2024 2349 by Elissa Becker RN  Outcome: Progressing  12/6/2024 1216 by Lakeisha Aguila RN  Outcome: Progressing     Problem: ABCDS Injury Assessment  Goal: Absence of physical injury  12/6/2024 2349 by Elissa Becker RN  Outcome: Progressing  12/6/2024 1216 by Lakeisha Aguila

## 2024-12-07 NOTE — PLAN OF CARE
No complaints at time of exam.         PHYSICAL EXAM:  Vitals:    12/07/24 0100 12/07/24 0200 12/07/24 0300 12/07/24 0418   BP: (!) 175/93 (!) 180/100 (!) 184/90 (!) 174/95   Pulse: (!) 101 (!) 101 (!) 104 99   Resp: 13 11 14 13   Temp:    98.3 °F (36.8 °C)   TempSrc:    Temporal   SpO2:    97%   Weight:       Height:             General: Alert, no distress, well-nourished  Neurologic  Mental status:   orientation to person, place, time, situation   Attention intact as able to attend well to the exam     Language fluent in conversation   Comprehension intact; follows simple commands    Cranial nerves:   CN2: Patient legally blind in left eye.   CN 3,4,6: Pupils 5 mm equal and reactive to light, extraocular muscles intact, very slight dysconjugate gaze.   CN5: Facial sensation symmetric   CN7: Face symmetric  CN8: Hearing symmetric to spoken voice  CN9: Palate elevated symmetrically  CN11: Traps full strength on shoulder shrug  CN12: Tongue midline with protrusion    Motor Exam:     R  L    Deltoid 4 + 5   Biceps 4+ 5   Triceps 4+ 5   Wrist extension  4+ 5   Interossei 4+ 5      R  L    Hip flexion  5 5   Hip extension  5 5   Knee flexion  5 5   Knee extension  5 5   Ankle dorsiflexion  5 5   Ankle plantar flexion  5 5       Sensory: light touch intact and symmetric in all 4 extremities.  No sensory extinction on bilateral simultaneous stimulation  Cerebellar/coordination: No dysdiadochokinesias.   Tone: normal in all 4 extremities  Gait: Deferred for safety    OTHER SYSTEMS:  Cardiovascular: Warm, appears well perfused   Respiratory: Easy, non-labored respiratory pattern   Abdominal: Abdomen is without distention   Extremities: Upper and lower extremities are atraumatic in appearance without deformity. No swelling or erythema.

## 2024-12-07 NOTE — PROGRESS NOTES
Patient is alert and oriented x4, VSS with exception to BP being slightly elevated. Denies pain. Neuro checks WNL. NIH a 1 for vision but this is her baseline. Vascath dressing is CDI. No stick/BP sleeve to SOCORRO for fistula. Lungs clear and bowel sounds active. Satting well on room air. Oliguric. Turns and repositions herself in bed well and often. Tolerating diet well. Tolerating ambulation. Fall precautions in place, no needs at this time.

## 2024-12-07 NOTE — DISCHARGE SUMMARY
INTERNAL MEDICINE DEPARTMENT  DISCHARGE SUMMARY    Patient ID: Kristine Ramirez                                             Discharge Date: 12/7/2024   Patient's PCP: Damon Mireles MD                                          Discharge Physician: Tommy Miller MD MD  Admit Date: 12/5/2024   Admitting Physician: Marck Ruiz MD    PROBLEMS DURING HOSPITALIZATION:  Present on Admission:   AMS (altered mental status)   Acute respiratory failure with hypoxia   Primary hypertension      DISCHARGE DIAGNOSES:  Right Vertebral Artery Occlusion  Respiratory Distress  COPD  HTN  Gastric Ulcer  ESRD on HD  T2DM  Anxiety/Depression    Hospital Course:    HPI:    The following issues were addressed during hospitalization:    The patient came for a scheduled outpatient endoscopy which she tolerated well.  After the procedure was over, the patient was weaned off of sedation but developed agonal breathing, started desaturating to 80s. Patient was sedated on glycopyrrolate, propofol and lidocaine. She was given Sugammadex for reversal. Performed bag mask to help with her breathing but she was eventually intubated. Code stroke was called as the patient had dilated nonreactive pupil. NIHSS score was between 25 and 28.  It was limited as the patient was unresponsive, and did not withdraw to pain in all 4 extremities.  Patient was immediately taken to CT/CTA head and neck.  stroke team was called and it was decided that TNK was not required at that time. POCT ABG showed pH of 7.3, pCO2 of 52 and pO2 of 372. LA WNL.     In the ICU, the patient was weaned off of sedation and was given a SBT.  Patient was able to maintain good tidal volume, was awake and was able to move all 4 extremities. She was successfully extubated shortly after. Her neurological exam returned to baseline soon after.    CTA demonstrated a right vertebral artery occlusion, determined to likely be chronic in nature. Initial plan was for permissive

## 2024-12-09 ENCOUNTER — TELEPHONE (OUTPATIENT)
Dept: FAMILY MEDICINE CLINIC | Age: 49
End: 2024-12-09

## 2024-12-09 ENCOUNTER — CARE COORDINATION (OUTPATIENT)
Dept: CARE COORDINATION | Age: 49
End: 2024-12-09

## 2024-12-09 NOTE — CARE COORDINATION
Ambulatory Care Coordination Note     2024 11:30 AM     Patient Current Location:  Home: [id number] [place] [address]  [address]  [facility] [address]     This patient was received as a referral from Ambulatory Care Manager .    ACM contacted the patient by telephone. Verified name and  with patient as identifiers. Provided introduction to self, and explanation of the ACM role.   Patient accepted care management services at this time.          ACM: Sujatha Shepard RN     Challenges to be reviewed by the provider   Additional needs identified to be addressed with provider No  none               Method of communication with provider: none.    Utilization: N/A - Initial Call     Care Summary Note: ACM received notification from PCP office pt was home from the hospital and needed assistance with transportation to her PCP appointment on 24 and her cardiology appt on 24. ACM called and spoke with pt who stated she will call her insurance company to schedule transportation and she agreed to call ACM if that transportation does not work out. Pt stated she does not currently have any needs or anything else ACM can assist her with at this time. She agreed to call ACM if that changes. Will f/u with pt again at a later date/time.     Offered patient enrollment in the Remote Patient Monitoring (RPM) program for in-home monitoring: Deferred at this time because n/a; will discuss at next outreach.     Assessments Completed:   Ambulatory Care Coordination Assessment    Care Coordination Protocol  Referral from Primary Care Provider: Yes  Week 1 - Initial Assessment     Do you have all of your prescriptions and are they filled?: Yes  Barriers to medication adherence: None  Are you able to afford your medications?: Yes  How often do you have trouble taking your medications the way you have been told to take them?: I always take them as prescribed.     Do you have Home O2 Therapy?: No      Ability to seek help/take action for

## 2024-12-09 NOTE — TELEPHONE ENCOUNTER
Care Transitions Initial Follow Up Call    Outreach made within 2 business days of discharge: Yes    Patient: Kristine Ramirez Patient : 1975   MRN: [id number]  Reason for Admission: AMS  Discharge Date: 24       Spoke with: patient     [facility]    TCM Interactive Patient Contact:  Was patient able to fill all prescriptions: No: patient left AMA and did not get prescriptions.  Was patient instructed to bring all medications to the follow-up visit: Yes  Is patient taking all medications as directed in the discharge summary? No  Does patient understand their discharge instructions: No: did not receive discharge summary  Does patient have questions or concerns that need addressed prior to 7-14 day follow up office visit: yes - patient is scheduled with cardio on 2024 and requires medical transportation.    Additional needs identified to be addressed with provider  Patient left the hospital AMA and did not receive medications or get scheduled with PCP for hospital follow up.  Patient needs assistance to set up transportation for all appointments.  Office has contacted care coordination to assist with transportation.    Care coordinator is working to schedule transportation.               Scheduled appointment with PCP within 7-14 days    Follow Up  Future Appointments   Date Time Provider Department Center   2024  1:30 PM Maricruz Hopkins APRN - CNP [facility] Cardio [facility]   2025 10:30 AM Damon Mireles MD [facility] DEP       Alvina Hui LPN

## 2024-12-29 DIAGNOSIS — F41.0 GENERALIZED ANXIETY DISORDER WITH PANIC ATTACKS: ICD-10-CM

## 2024-12-29 DIAGNOSIS — G43.E09 CHRONIC MIGRAINE WITH AURA: ICD-10-CM

## 2024-12-29 DIAGNOSIS — F41.1 GENERALIZED ANXIETY DISORDER WITH PANIC ATTACKS: ICD-10-CM

## 2024-12-30 DIAGNOSIS — Z79.4 TYPE 2 DIABETES MELLITUS WITH DIABETIC POLYNEUROPATHY, WITH LONG-TERM CURRENT USE OF INSULIN (HCC): ICD-10-CM

## 2024-12-30 DIAGNOSIS — E11.42 TYPE 2 DIABETES MELLITUS WITH DIABETIC POLYNEUROPATHY, WITH LONG-TERM CURRENT USE OF INSULIN (HCC): ICD-10-CM

## 2024-12-30 RX ORDER — BUSPIRONE HYDROCHLORIDE 10 MG/1
10 TABLET ORAL 2 TIMES DAILY
Qty: 180 TABLET | Refills: 1 | Status: SHIPPED | OUTPATIENT
Start: 2024-12-30

## 2024-12-30 RX ORDER — PREGABALIN 75 MG/1
75 CAPSULE ORAL DAILY
Qty: 90 CAPSULE | Refills: 0 | Status: SHIPPED | OUTPATIENT
Start: 2024-12-30 | End: 2025-03-30

## 2024-12-30 RX ORDER — DESVENLAFAXINE 25 MG/1
25 TABLET, EXTENDED RELEASE ORAL DAILY
Qty: 90 TABLET | Refills: 1 | Status: SHIPPED | OUTPATIENT
Start: 2024-12-30

## 2024-12-30 NOTE — TELEPHONE ENCOUNTER
PDMP monitoring:  -No significant or noteworthy irregularities noted.   -Last report:   Last PDMP [place] as Reviewed ([place]):  Review User Review Instant Review Result   DAMON JAIMES 12/30/2024  2:57 PM Reviewed PDMP [1]     Rx sent.    Damon Jaimes MD  Family Medicine  [facility]    
Pt would like a medication refill with a NEW PHARMACY     pregabalin (LYRICA) 75 MG capsule [[id number]]  ENDED    Order Details  Dose: 75 mg Route: Oral Frequency: DAILY   Dispense Quantity: 90 capsule       [facility]   Address: [facility] [address], [place] [address]  Phone: [phone]    Next appt: 1/30/24  Last appt: 10/31/24  
None

## 2024-12-30 NOTE — TELEPHONE ENCOUNTER
Medication:   Requested Prescriptions     Pending Prescriptions Disp Refills    desvenlafaxine succinate (PRISTIQ) 25 MG TB24 extended release tablet [Pharmacy Med Name: DESVENLAFAXINE SUCCNT ER 25 MG] 90 tablet 1     Sig: TAKE 1 TABLET BY MOUTH DAILY    busPIRone (BUSPAR) 10 MG tablet [Pharmacy Med Name: busPIRone HCL 10 MG TABLET] 180 tablet 1     Sig: TAKE 1 TABLET BY MOUTH 2 TIMES A DAY     Last Filled:  both medications 06/24/24    Last appt: 10/31/2024   Next appt: 1/30/2025    Last OARRS:        No data to display

## 2025-01-04 NOTE — PROGRESS NOTES
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, associated with type 2 diabetes mellitus (HCC)    Cellulitis of right foot    Hidradenitis suppurativa    Hypocalcemia    Non-toxic multinodular goiter    Polyneuropathy due to type 2 diabetes mellitus (HCC)    Proliferative diabetic retinopathy associated with type 2 diabetes mellitus (HCC)    End-stage renal disease on hemodialysis (HCC)    Epiglottitis    Recurrent falls    Hypotension    Leukocytosis    Volume overload    Hemodialysis catheter dysfunction (HCC)    Hypoproteinemia (HCC)    GABRIELA (obstructive sleep apnea)    Type 2 diabetes mellitus with obesity (HCC)    Wheelchair dependence    ESRD (end stage renal disease) (HCC)    Hyperkalemia    Suspected COVID-19 virus infection    Dependent on walker for ambulation    Medication management contract agreement - signed on 5/18/2023    Controlled drug dependence (HCC)    Generalized anxiety disorder with panic attacks    Coagulation defect (HCC)    Chronic obstructive pulmonary disease, unspecified    Abdominal distention    Ascites, malignant    Encounter for assessment of ascites    Other ascites    Physical deconditioning    AMS (altered mental status)    Acute respiratory failure with hypoxia    Primary hypertension       Allergies   Allergen Reactions    Amoxicillin Hives, Itching and Other (See Comments)     Tolerates cephalosporins  Patient tolerating cefazolin (ANCEF) as of October 11, 2018      Atorvastatin Diarrhea    Levofloxacin Anaphylaxis    Vancomycin Anaphylaxis, Hives and Shortness Of Breath    Tape [Adhesive Tape] Other (See Comments)     Paper tape turns skin bright red. Plastic tape okay.        Medications listed as ordered at the time of discharge from hospital:     Medication List

## 2025-01-16 ENCOUNTER — CARE COORDINATION (OUTPATIENT)
Dept: CARE COORDINATION | Age: 50
End: 2025-01-16

## 2025-01-16 NOTE — CARE COORDINATION
Ambulatory Care Coordination Note     2025 1:59 PM     Patient Current Location:  Home: [address]  [facility] [address]     ACM contacted the patient by telephone. Verified name and  with patient as identifiers.     Patient graduated from the High Risk Care Management program on 2025.  Patient progressing towards self management.  reinforced resources provided during this care transition period..  Care management goals have been completed. No further Ambulatory Care Manager follow up scheduled.      ACM: Sujatha Shepard RN     Challenges to be reviewed by the provider   Additional needs identified to be addressed with provider No  none               Method of communication with provider: none.    Utilization: Patient has not had any utilization since our last call.     Care Summary Note: F/u with patient today. She confirmed she doesn't need any additional resources at this time and agreed she would call ACM if that changes. ACM to end current CC episode and will remove name from care team.     Offered patient enrollment in the Remote Patient Monitoring (RPM) program for in-home monitoring: Patient is not eligible for RPM program because: graduating from CC .     Assessments Completed:   No changes since last call    Medications Reviewed:   Patient denies any changes with medications and reports taking all medications as prescribed.    Advance Care Planning:   Not reviewed during this call     Care Planning:   Education Documentation  No documentation found.  Education Comments  No comments found.     ,    Goals Addressed                   This Visit's Progress     COMPLETED: Community Resource Goal   On track     I will complete referral to community agency [facility] [employer] [facility] and [employer] for assistance.   I will complete application for assistance to [facility] [employer] [facility] and [employer] for additional food resources .   I will

## 2025-01-30 ENCOUNTER — OFFICE VISIT (OUTPATIENT)
Dept: FAMILY MEDICINE CLINIC | Age: 50
End: 2025-01-30

## 2025-01-30 VITALS
OXYGEN SATURATION: 98 % | SYSTOLIC BLOOD PRESSURE: 160 MMHG | DIASTOLIC BLOOD PRESSURE: 82 MMHG | BODY MASS INDEX: 27.86 KG/M2 | WEIGHT: 177.5 LBS | HEART RATE: 96 BPM | HEIGHT: 67 IN

## 2025-01-30 DIAGNOSIS — Z99.3 WHEELCHAIR DEPENDENT: ICD-10-CM

## 2025-01-30 DIAGNOSIS — F41.0 GENERALIZED ANXIETY DISORDER WITH PANIC ATTACKS: ICD-10-CM

## 2025-01-30 DIAGNOSIS — I65.01 OCCLUSION OF RIGHT VERTEBRAL ARTERY: ICD-10-CM

## 2025-01-30 DIAGNOSIS — R18.8 OTHER ASCITES: ICD-10-CM

## 2025-01-30 DIAGNOSIS — J43.9 PULMONARY EMPHYSEMA, UNSPECIFIED EMPHYSEMA TYPE (HCC): ICD-10-CM

## 2025-01-30 DIAGNOSIS — F41.1 GENERALIZED ANXIETY DISORDER WITH PANIC ATTACKS: ICD-10-CM

## 2025-01-30 DIAGNOSIS — F13.20 BENZODIAZEPINE DEPENDENCE, CONTINUOUS (HCC): ICD-10-CM

## 2025-01-30 DIAGNOSIS — I42.9 CARDIOMYOPATHY, UNSPECIFIED TYPE (HCC): ICD-10-CM

## 2025-01-30 DIAGNOSIS — E11.22 TYPE 2 DM WITH HYPERTENSION AND ESRD ON DIALYSIS (HCC): Primary | ICD-10-CM

## 2025-01-30 DIAGNOSIS — R63.5 WEIGHT GAIN: ICD-10-CM

## 2025-01-30 DIAGNOSIS — Z99.2 TYPE 2 DM WITH HYPERTENSION AND ESRD ON DIALYSIS (HCC): Primary | ICD-10-CM

## 2025-01-30 DIAGNOSIS — E11.3523 BOTH EYES AFFECTED BY PROLIFERATIVE DIABETIC RETINOPATHY WITH TRACTION RETINAL DETACHMENTS INVOLVING MACULAE, ASSOCIATED WITH TYPE 2 DIABETES MELLITUS (HCC): ICD-10-CM

## 2025-01-30 DIAGNOSIS — I12.0 TYPE 2 DM WITH HYPERTENSION AND ESRD ON DIALYSIS (HCC): Primary | ICD-10-CM

## 2025-01-30 DIAGNOSIS — N18.6 TYPE 2 DM WITH HYPERTENSION AND ESRD ON DIALYSIS (HCC): Primary | ICD-10-CM

## 2025-01-30 PROBLEM — J96.01 ACUTE RESPIRATORY FAILURE WITH HYPOXIA: Status: RESOLVED | Noted: 2024-12-05 | Resolved: 2025-01-30

## 2025-01-30 ASSESSMENT — PATIENT HEALTH QUESTIONNAIRE - PHQ9
SUM OF ALL RESPONSES TO PHQ9 QUESTIONS 1 & 2: 0
SUM OF ALL RESPONSES TO PHQ QUESTIONS 1-9: 0
SUM OF ALL RESPONSES TO PHQ QUESTIONS 1-9: 0
10. IF YOU CHECKED OFF ANY PROBLEMS, HOW DIFFICULT HAVE THESE PROBLEMS MADE IT FOR YOU TO DO YOUR WORK, TAKE CARE OF THINGS AT HOME, OR GET ALONG WITH OTHER PEOPLE: NOT DIFFICULT AT ALL
SUM OF ALL RESPONSES TO PHQ QUESTIONS 1-9: 0
9. THOUGHTS THAT YOU WOULD BE BETTER OFF DEAD, OR OF HURTING YOURSELF: NOT AT ALL
6. FEELING BAD ABOUT YOURSELF - OR THAT YOU ARE A FAILURE OR HAVE LET YOURSELF OR YOUR FAMILY DOWN: NOT AT ALL
5. POOR APPETITE OR OVEREATING: NOT AT ALL
8. MOVING OR SPEAKING SO SLOWLY THAT OTHER PEOPLE COULD HAVE NOTICED. OR THE OPPOSITE, BEING SO FIGETY OR RESTLESS THAT YOU HAVE BEEN MOVING AROUND A LOT MORE THAN USUAL: NOT AT ALL
2. FEELING DOWN, DEPRESSED OR HOPELESS: NOT AT ALL
1. LITTLE INTEREST OR PLEASURE IN DOING THINGS: NOT AT ALL
SUM OF ALL RESPONSES TO PHQ QUESTIONS 1-9: 0
3. TROUBLE FALLING OR STAYING ASLEEP: NOT AT ALL
4. FEELING TIRED OR HAVING LITTLE ENERGY: NOT AT ALL
7. TROUBLE CONCENTRATING ON THINGS, SUCH AS READING THE NEWSPAPER OR WATCHING TELEVISION: NOT AT ALL

## 2025-02-01 PROBLEM — R18.0 ASCITES, MALIGNANT: Status: RESOLVED | Noted: 2024-08-30 | Resolved: 2025-02-01

## 2025-02-01 ASSESSMENT — ENCOUNTER SYMPTOMS
SINUS PRESSURE: 0
RHINORRHEA: 0
CONSTIPATION: 0
COUGH: 0
SHORTNESS OF BREATH: 0
BACK PAIN: 0
DIARRHEA: 0
WHEEZING: 0
BLOOD IN STOOL: 0
NAUSEA: 0
CHEST TIGHTNESS: 0
VOMITING: 0
ABDOMINAL DISTENTION: 0
TROUBLE SWALLOWING: 0
ABDOMINAL PAIN: 0

## 2025-02-18 DIAGNOSIS — N18.6 TYPE 2 DM WITH HYPERTENSION AND ESRD ON DIALYSIS (HCC): ICD-10-CM

## 2025-02-18 DIAGNOSIS — E11.22 TYPE 2 DM WITH HYPERTENSION AND ESRD ON DIALYSIS (HCC): ICD-10-CM

## 2025-02-18 DIAGNOSIS — Z99.2 TYPE 2 DM WITH HYPERTENSION AND ESRD ON DIALYSIS (HCC): ICD-10-CM

## 2025-02-18 DIAGNOSIS — I12.0 TYPE 2 DM WITH HYPERTENSION AND ESRD ON DIALYSIS (HCC): ICD-10-CM

## 2025-02-19 RX ORDER — CLONIDINE HYDROCHLORIDE 0.2 MG/1
TABLET ORAL
Qty: 270 TABLET | Refills: 1 | Status: SHIPPED | OUTPATIENT
Start: 2025-02-19

## 2025-02-26 DIAGNOSIS — F41.1 GENERALIZED ANXIETY DISORDER WITH PANIC ATTACKS: ICD-10-CM

## 2025-02-26 DIAGNOSIS — F19.20 CONTROLLED DRUG DEPENDENCE (HCC): ICD-10-CM

## 2025-02-26 DIAGNOSIS — Z02.89 MEDICATION MANAGEMENT CONTRACT AGREEMENT: ICD-10-CM

## 2025-02-26 DIAGNOSIS — F41.0 GENERALIZED ANXIETY DISORDER WITH PANIC ATTACKS: ICD-10-CM

## 2025-02-26 DIAGNOSIS — F13.20 BENZODIAZEPINE DEPENDENCE, CONTINUOUS (HCC): ICD-10-CM

## 2025-02-26 RX ORDER — ALPRAZOLAM 2 MG/1
2 TABLET, EXTENDED RELEASE ORAL EVERY MORNING
Qty: 90 TABLET | Refills: 0 | Status: CANCELLED | OUTPATIENT
Start: 2025-02-26 | End: 2025-05-27

## 2025-02-26 RX ORDER — ALPRAZOLAM 2 MG/1
2 TABLET, EXTENDED RELEASE ORAL EVERY MORNING
Qty: 90 TABLET | Refills: 0 | Status: SHIPPED | OUTPATIENT
Start: 2025-02-26 | End: 2025-05-27

## 2025-02-26 NOTE — TELEPHONE ENCOUNTER
PDMP monitoring:  -No significant or noteworthy irregularities noted.   -Last report:   Last PDMP [place] Reviewed ([place]):  Review User Review Instant Review Result   DAMON JAIMES 2/26/2025  9:47 AM Reviewed PDMP [1]     Rx sent.    Damon Jaimes MD  Family Medicine  [facility]

## 2025-02-26 NOTE — TELEPHONE ENCOUNTER
Patient requesting medication refill.   Disp Refills Start End    ALPRAZolam (ALPRAZOLAM XR) 2 MG extended release tablet 90 tablet 0 11/27/2024 2/25/2025    Sig - Route: Take 1 tablet by mouth every morning for 90 days. Max Daily Amount: 2 mg - Oral    [facility] 1/30/2025  Nov 4/3/2025

## 2025-02-26 NOTE — TELEPHONE ENCOUNTER
Duplicate rx.    Damon Mireles MD  Family Medicine  [facility]

## 2025-03-04 ENCOUNTER — APPOINTMENT (OUTPATIENT)
Dept: CT IMAGING | Age: 50
DRG: 193 | End: 2025-03-04
Payer: COMMERCIAL

## 2025-03-04 ENCOUNTER — APPOINTMENT (OUTPATIENT)
Dept: GENERAL RADIOLOGY | Age: 50
DRG: 193 | End: 2025-03-04
Payer: COMMERCIAL

## 2025-03-04 ENCOUNTER — HOSPITAL ENCOUNTER (INPATIENT)
Age: 50
LOS: 1 days | Discharge: HOME | DRG: 193 | End: 2025-03-05
Attending: EMERGENCY MEDICINE | Admitting: INTERNAL MEDICINE
Payer: COMMERCIAL

## 2025-03-04 DIAGNOSIS — R29.6 FREQUENT FALLS: ICD-10-CM

## 2025-03-04 DIAGNOSIS — J18.9 PNEUMONIA DUE TO INFECTIOUS ORGANISM, UNSPECIFIED LATERALITY, UNSPECIFIED PART OF LUNG: Primary | ICD-10-CM

## 2025-03-04 DIAGNOSIS — R53.1 GENERAL WEAKNESS: ICD-10-CM

## 2025-03-04 DIAGNOSIS — J81.0 ACUTE PULMONARY EDEMA (HCC): ICD-10-CM

## 2025-03-04 DIAGNOSIS — Z99.2 ESRD ON HEMODIALYSIS (HCC): ICD-10-CM

## 2025-03-04 DIAGNOSIS — J96.01 ACUTE HYPOXEMIC RESPIRATORY FAILURE (HCC): ICD-10-CM

## 2025-03-04 DIAGNOSIS — N18.6 ESRD ON HEMODIALYSIS (HCC): ICD-10-CM

## 2025-03-04 PROBLEM — J15.9 PNEUMONIA, BACTERIAL: Status: ACTIVE | Noted: 2025-03-04

## 2025-03-04 LAB
ALBUMIN SERPL-MCNC: 4.3 G/DL (ref 3.4–5)
ALBUMIN/GLOB SERPL: 1.1 {RATIO} (ref 1.1–2.2)
ALP SERPL-CCNC: 214 U/L (ref 40–129)
ALT SERPL-CCNC: 11 U/L (ref 10–40)
ANION GAP SERPL CALCULATED.3IONS-SCNC: 24 MMOL/L (ref 3–16)
ANION GAP SERPL CALCULATED.3IONS-SCNC: 25 MMOL/L (ref 3–16)
AST SERPL-CCNC: 26 U/L (ref 15–37)
BASE EXCESS BLDV CALC-SCNC: 0.5 MMOL/L (ref -3–3)
BASOPHILS # BLD: 0 K/UL (ref 0–0.2)
BASOPHILS # BLD: 0 K/UL (ref 0–0.2)
BASOPHILS NFR BLD: 0.3 %
BASOPHILS NFR BLD: 0.4 %
BILIRUB SERPL-MCNC: 1.6 MG/DL (ref 0–1)
BUN SERPL-MCNC: 79 MG/DL (ref 7–20)
BUN SERPL-MCNC: 85 MG/DL (ref 7–20)
CALCIUM SERPL-MCNC: 8.8 MG/DL (ref 8.3–10.6)
CALCIUM SERPL-MCNC: 9 MG/DL (ref 8.3–10.6)
CHLORIDE SERPL-SCNC: 88 MMOL/L (ref 99–110)
CHLORIDE SERPL-SCNC: 88 MMOL/L (ref 99–110)
CO2 BLDV-SCNC: 59 MMOL/L
CO2 SERPL-SCNC: 20 MMOL/L (ref 21–32)
CO2 SERPL-SCNC: 21 MMOL/L (ref 21–32)
COHGB MFR BLDV: 4.7 % (ref 0–1.5)
CREAT SERPL-MCNC: 6.6 MG/DL (ref 0.6–1.1)
CREAT SERPL-MCNC: 7 MG/DL (ref 0.6–1.1)
DEPRECATED RDW RBC AUTO: 19.9 % (ref 12.4–15.4)
DEPRECATED RDW RBC AUTO: 20.8 % (ref 12.4–15.4)
EKG ATRIAL RATE: 101 BPM
EKG DIAGNOSIS: NORMAL
EKG P AXIS: 21 DEGREES
EKG P-R INTERVAL: 142 MS
EKG Q-T INTERVAL: 344 MS
EKG QRS DURATION: 80 MS
EKG QTC CALCULATION (BAZETT): 446 MS
EKG R AXIS: 32 DEGREES
EKG T AXIS: 67 DEGREES
EKG VENTRICULAR RATE: 101 BPM
EOSINOPHIL # BLD: 0 K/UL (ref 0–0.6)
EOSINOPHIL # BLD: 0 K/UL (ref 0–0.6)
EOSINOPHIL NFR BLD: 0 %
EOSINOPHIL NFR BLD: 0.4 %
FLUAV RNA RESP QL NAA+PROBE: NOT DETECTED
FLUBV RNA RESP QL NAA+PROBE: NOT DETECTED
GFR SERPLBLD CREATININE-BSD FMLA CKD-EPI: 7 ML/MIN/{1.73_M2}
GFR SERPLBLD CREATININE-BSD FMLA CKD-EPI: 7 ML/MIN/{1.73_M2}
GLUCOSE BLD-MCNC: 245 MG/DL (ref 70–99)
GLUCOSE BLD-MCNC: 296 MG/DL (ref 70–99)
GLUCOSE SERPL-MCNC: 151 MG/DL (ref 70–99)
GLUCOSE SERPL-MCNC: 228 MG/DL (ref 70–99)
HCO3 BLDV-SCNC: 24.9 MMOL/L (ref 23–29)
HCT VFR BLD AUTO: 36.2 % (ref 36–48)
HCT VFR BLD AUTO: 39 % (ref 36–48)
HGB BLD-MCNC: 12.1 G/DL (ref 12–16)
HGB BLD-MCNC: 12.6 G/DL (ref 12–16)
INR PPP: 1.38 (ref 0.85–1.15)
LACTATE BLDV-SCNC: 2 MMOL/L (ref 0.4–1.9)
LACTATE BLDV-SCNC: 2 MMOL/L (ref 0.4–1.9)
LYMPHOCYTES # BLD: 0.2 K/UL (ref 1–5.1)
LYMPHOCYTES # BLD: 1 K/UL (ref 1–5.1)
LYMPHOCYTES NFR BLD: 1.9 %
LYMPHOCYTES NFR BLD: 9.5 %
MCH RBC QN AUTO: 29.6 PG (ref 26–34)
MCH RBC QN AUTO: 29.6 PG (ref 26–34)
MCHC RBC AUTO-ENTMCNC: 32.3 G/DL (ref 31–36)
MCHC RBC AUTO-ENTMCNC: 33.3 G/DL (ref 31–36)
MCV RBC AUTO: 88.8 FL (ref 80–100)
MCV RBC AUTO: 91.4 FL (ref 80–100)
METHGB MFR BLDV: 0.7 %
MONOCYTES # BLD: 0.2 K/UL (ref 0–1.3)
MONOCYTES # BLD: 0.9 K/UL (ref 0–1.3)
MONOCYTES NFR BLD: 2.3 %
MONOCYTES NFR BLD: 8.7 %
NEUTROPHILS # BLD: 8.5 K/UL (ref 1.7–7.7)
NEUTROPHILS # BLD: 8.9 K/UL (ref 1.7–7.7)
NEUTROPHILS NFR BLD: 81 %
NEUTROPHILS NFR BLD: 95.5 %
NT-PROBNP SERPL-MCNC: ABNORMAL PG/ML (ref 0–124)
O2 CT VFR BLDV CALC: 15 VOL %
O2 THERAPY: ABNORMAL
PCO2 BLDV: 38.5 MMHG (ref 40–50)
PERFORMED ON: ABNORMAL
PERFORMED ON: ABNORMAL
PH BLDV: 7.42 [PH] (ref 7.35–7.45)
PLATELET # BLD AUTO: 130 K/UL (ref 135–450)
PLATELET # BLD AUTO: 132 K/UL (ref 135–450)
PMV BLD AUTO: 8.4 FL (ref 5–10.5)
PMV BLD AUTO: 8.9 FL (ref 5–10.5)
PO2 BLDV: 124 MMHG (ref 25–40)
POTASSIUM SERPL-SCNC: 5.4 MMOL/L (ref 3.5–5.1)
POTASSIUM SERPL-SCNC: 5.8 MMOL/L (ref 3.5–5.1)
PROCALCITONIN SERPL IA-MCNC: 3.08 NG/ML (ref 0–0.15)
PROT SERPL-MCNC: 8.2 G/DL (ref 6.4–8.2)
PROTHROMBIN TIME: 17.2 SEC (ref 11.9–14.9)
RBC # BLD AUTO: 4.08 M/UL (ref 4–5.2)
RBC # BLD AUTO: 4.26 M/UL (ref 4–5.2)
SAO2 % BLDV: 98 %
SARS-COV-2 RNA RESP QL NAA+PROBE: NOT DETECTED
SODIUM SERPL-SCNC: 132 MMOL/L (ref 136–145)
SODIUM SERPL-SCNC: 134 MMOL/L (ref 136–145)
TROPONIN, HIGH SENSITIVITY: 324 NG/L (ref 0–14)
TROPONIN, HIGH SENSITIVITY: 337 NG/L (ref 0–14)
WBC # BLD AUTO: 10.5 K/UL (ref 4–11)
WBC # BLD AUTO: 9.3 K/UL (ref 4–11)

## 2025-03-04 PROCEDURE — 2580000003 HC RX 258: Performed by: EMERGENCY MEDICINE

## 2025-03-04 PROCEDURE — 80053 COMPREHEN METABOLIC PANEL: CPT

## 2025-03-04 PROCEDURE — 6360000002 HC RX W HCPCS: Performed by: STUDENT IN AN ORGANIZED HEALTH CARE EDUCATION/TRAINING PROGRAM

## 2025-03-04 PROCEDURE — 6360000002 HC RX W HCPCS: Performed by: INTERNAL MEDICINE

## 2025-03-04 PROCEDURE — G0378 HOSPITAL OBSERVATION PER HR: HCPCS

## 2025-03-04 PROCEDURE — 83880 ASSAY OF NATRIURETIC PEPTIDE: CPT

## 2025-03-04 PROCEDURE — 6370000000 HC RX 637 (ALT 250 FOR IP): Performed by: EMERGENCY MEDICINE

## 2025-03-04 PROCEDURE — 96366 THER/PROPH/DIAG IV INF ADDON: CPT

## 2025-03-04 PROCEDURE — 93010 ELECTROCARDIOGRAM REPORT: CPT | Performed by: INTERNAL MEDICINE

## 2025-03-04 PROCEDURE — 6370000000 HC RX 637 (ALT 250 FOR IP): Performed by: INTERNAL MEDICINE

## 2025-03-04 PROCEDURE — 96375 TX/PRO/DX INJ NEW DRUG ADDON: CPT

## 2025-03-04 PROCEDURE — 85610 PROTHROMBIN TIME: CPT

## 2025-03-04 PROCEDURE — 82803 BLOOD GASES ANY COMBINATION: CPT

## 2025-03-04 PROCEDURE — 5A1D70Z PERFORMANCE OF URINARY FILTRATION, INTERMITTENT, LESS THAN 6 HOURS PER DAY: ICD-10-PCS | Performed by: STUDENT IN AN ORGANIZED HEALTH CARE EDUCATION/TRAINING PROGRAM

## 2025-03-04 PROCEDURE — 96376 TX/PRO/DX INJ SAME DRUG ADON: CPT

## 2025-03-04 PROCEDURE — 83605 ASSAY OF LACTIC ACID: CPT

## 2025-03-04 PROCEDURE — 2500000003 HC RX 250 WO HCPCS: Performed by: INTERNAL MEDICINE

## 2025-03-04 PROCEDURE — 70450 CT HEAD/BRAIN W/O DYE: CPT

## 2025-03-04 PROCEDURE — 99285 EMERGENCY DEPT VISIT HI MDM: CPT

## 2025-03-04 PROCEDURE — 96365 THER/PROPH/DIAG IV INF INIT: CPT

## 2025-03-04 PROCEDURE — 94640 AIRWAY INHALATION TREATMENT: CPT

## 2025-03-04 PROCEDURE — 6370000000 HC RX 637 (ALT 250 FOR IP): Performed by: STUDENT IN AN ORGANIZED HEALTH CARE EDUCATION/TRAINING PROGRAM

## 2025-03-04 PROCEDURE — 84484 ASSAY OF TROPONIN QUANT: CPT

## 2025-03-04 PROCEDURE — 90935 HEMODIALYSIS ONE EVALUATION: CPT

## 2025-03-04 PROCEDURE — 36415 COLL VENOUS BLD VENIPUNCTURE: CPT

## 2025-03-04 PROCEDURE — 6360000002 HC RX W HCPCS: Performed by: EMERGENCY MEDICINE

## 2025-03-04 PROCEDURE — 96372 THER/PROPH/DIAG INJ SC/IM: CPT

## 2025-03-04 PROCEDURE — 85025 COMPLETE CBC W/AUTO DIFF WBC: CPT

## 2025-03-04 PROCEDURE — 87636 SARSCOV2 & INF A&B AMP PRB: CPT

## 2025-03-04 PROCEDURE — 2500000003 HC RX 250 WO HCPCS: Performed by: EMERGENCY MEDICINE

## 2025-03-04 PROCEDURE — 1200000000 HC SEMI PRIVATE

## 2025-03-04 PROCEDURE — 71045 X-RAY EXAM CHEST 1 VIEW: CPT

## 2025-03-04 PROCEDURE — 2700000000 HC OXYGEN THERAPY PER DAY

## 2025-03-04 PROCEDURE — 93005 ELECTROCARDIOGRAM TRACING: CPT | Performed by: EMERGENCY MEDICINE

## 2025-03-04 PROCEDURE — 87040 BLOOD CULTURE FOR BACTERIA: CPT

## 2025-03-04 PROCEDURE — 2580000003 HC RX 258: Performed by: STUDENT IN AN ORGANIZED HEALTH CARE EDUCATION/TRAINING PROGRAM

## 2025-03-04 PROCEDURE — 94761 N-INVAS EAR/PLS OXIMETRY MLT: CPT

## 2025-03-04 PROCEDURE — 84145 PROCALCITONIN (PCT): CPT

## 2025-03-04 RX ORDER — SODIUM CHLORIDE 9 MG/ML
INJECTION, SOLUTION INTRAVENOUS PRN
Status: DISCONTINUED | OUTPATIENT
Start: 2025-03-04 | End: 2025-03-05 | Stop reason: HOSPADM

## 2025-03-04 RX ORDER — ACETAMINOPHEN 325 MG/1
650 TABLET ORAL EVERY 6 HOURS PRN
Status: DISCONTINUED | OUTPATIENT
Start: 2025-03-04 | End: 2025-03-05 | Stop reason: HOSPADM

## 2025-03-04 RX ORDER — HEPARIN SODIUM 1000 [USP'U]/ML
3200 INJECTION, SOLUTION INTRAVENOUS; SUBCUTANEOUS PRN
Status: DISCONTINUED | OUTPATIENT
Start: 2025-03-04 | End: 2025-03-05 | Stop reason: HOSPADM

## 2025-03-04 RX ORDER — SODIUM CHLORIDE 0.9 % (FLUSH) 0.9 %
5-40 SYRINGE (ML) INJECTION PRN
Status: DISCONTINUED | OUTPATIENT
Start: 2025-03-04 | End: 2025-03-05 | Stop reason: HOSPADM

## 2025-03-04 RX ORDER — HYDRALAZINE HYDROCHLORIDE 20 MG/ML
5 INJECTION INTRAMUSCULAR; INTRAVENOUS EVERY 4 HOURS PRN
Status: DISCONTINUED | OUTPATIENT
Start: 2025-03-04 | End: 2025-03-05 | Stop reason: HOSPADM

## 2025-03-04 RX ORDER — SEVELAMER CARBONATE 0.8 G/1
2.4 POWDER, FOR SUSPENSION ORAL
Status: DISCONTINUED | OUTPATIENT
Start: 2025-03-04 | End: 2025-03-05 | Stop reason: HOSPADM

## 2025-03-04 RX ORDER — ONDANSETRON 2 MG/ML
4 INJECTION INTRAMUSCULAR; INTRAVENOUS EVERY 6 HOURS PRN
Status: DISCONTINUED | OUTPATIENT
Start: 2025-03-04 | End: 2025-03-05 | Stop reason: HOSPADM

## 2025-03-04 RX ORDER — SODIUM CHLORIDE 0.9 % (FLUSH) 0.9 %
5-40 SYRINGE (ML) INJECTION EVERY 12 HOURS SCHEDULED
Status: DISCONTINUED | OUTPATIENT
Start: 2025-03-04 | End: 2025-03-05 | Stop reason: HOSPADM

## 2025-03-04 RX ORDER — ALPRAZOLAM 0.5 MG
0.5 TABLET ORAL EVERY 6 HOURS PRN
Status: DISCONTINUED | OUTPATIENT
Start: 2025-03-04 | End: 2025-03-05 | Stop reason: HOSPADM

## 2025-03-04 RX ORDER — AMLODIPINE BESYLATE 5 MG/1
5 TABLET ORAL DAILY
COMMUNITY
Start: 2025-01-08

## 2025-03-04 RX ORDER — VENLAFAXINE 25 MG/1
25 TABLET ORAL
Status: DISCONTINUED | OUTPATIENT
Start: 2025-03-04 | End: 2025-03-05 | Stop reason: HOSPADM

## 2025-03-04 RX ORDER — CLONIDINE HYDROCHLORIDE 0.1 MG/1
0.2 TABLET ORAL 3 TIMES DAILY
Status: DISCONTINUED | OUTPATIENT
Start: 2025-03-04 | End: 2025-03-05 | Stop reason: HOSPADM

## 2025-03-04 RX ORDER — LABETALOL HYDROCHLORIDE 5 MG/ML
10 INJECTION, SOLUTION INTRAVENOUS EVERY 4 HOURS PRN
Status: DISCONTINUED | OUTPATIENT
Start: 2025-03-04 | End: 2025-03-05 | Stop reason: HOSPADM

## 2025-03-04 RX ORDER — HEPARIN SODIUM 5000 [USP'U]/ML
5000 INJECTION, SOLUTION INTRAVENOUS; SUBCUTANEOUS EVERY 8 HOURS SCHEDULED
Status: DISCONTINUED | OUTPATIENT
Start: 2025-03-04 | End: 2025-03-05 | Stop reason: HOSPADM

## 2025-03-04 RX ORDER — DEXTROSE MONOHYDRATE 100 MG/ML
INJECTION, SOLUTION INTRAVENOUS CONTINUOUS PRN
Status: DISCONTINUED | OUTPATIENT
Start: 2025-03-04 | End: 2025-03-05 | Stop reason: HOSPADM

## 2025-03-04 RX ORDER — POLYETHYLENE GLYCOL 3350 17 G/17G
17 POWDER, FOR SOLUTION ORAL DAILY PRN
Status: DISCONTINUED | OUTPATIENT
Start: 2025-03-04 | End: 2025-03-05 | Stop reason: HOSPADM

## 2025-03-04 RX ORDER — ONDANSETRON 4 MG/1
4 TABLET, ORALLY DISINTEGRATING ORAL EVERY 8 HOURS PRN
Status: DISCONTINUED | OUTPATIENT
Start: 2025-03-04 | End: 2025-03-05 | Stop reason: HOSPADM

## 2025-03-04 RX ORDER — ACETAMINOPHEN 650 MG/1
650 SUPPOSITORY RECTAL EVERY 6 HOURS PRN
Status: DISCONTINUED | OUTPATIENT
Start: 2025-03-04 | End: 2025-03-05 | Stop reason: HOSPADM

## 2025-03-04 RX ORDER — LACTOBACILLUS RHAMNOSUS GG 10B CELL
1 CAPSULE ORAL 2 TIMES DAILY WITH MEALS
Status: DISCONTINUED | OUTPATIENT
Start: 2025-03-04 | End: 2025-03-05 | Stop reason: HOSPADM

## 2025-03-04 RX ORDER — TORSEMIDE 20 MG/1
40 TABLET ORAL DAILY
Status: DISCONTINUED | OUTPATIENT
Start: 2025-03-04 | End: 2025-03-05 | Stop reason: HOSPADM

## 2025-03-04 RX ORDER — IPRATROPIUM BROMIDE AND ALBUTEROL SULFATE 2.5; .5 MG/3ML; MG/3ML
3 SOLUTION RESPIRATORY (INHALATION) ONCE
Status: COMPLETED | OUTPATIENT
Start: 2025-03-04 | End: 2025-03-04

## 2025-03-04 RX ORDER — BUSPIRONE HYDROCHLORIDE 5 MG/1
10 TABLET ORAL 2 TIMES DAILY
Status: DISCONTINUED | OUTPATIENT
Start: 2025-03-04 | End: 2025-03-05 | Stop reason: HOSPADM

## 2025-03-04 RX ORDER — IPRATROPIUM BROMIDE AND ALBUTEROL SULFATE 2.5; .5 MG/3ML; MG/3ML
1 SOLUTION RESPIRATORY (INHALATION)
Status: DISCONTINUED | OUTPATIENT
Start: 2025-03-04 | End: 2025-03-05 | Stop reason: HOSPADM

## 2025-03-04 RX ORDER — PANTOPRAZOLE SODIUM 40 MG/1
40 TABLET, DELAYED RELEASE ORAL
Status: DISCONTINUED | OUTPATIENT
Start: 2025-03-04 | End: 2025-03-04

## 2025-03-04 RX ORDER — PREGABALIN 75 MG/1
75 CAPSULE ORAL DAILY
Status: DISCONTINUED | OUTPATIENT
Start: 2025-03-04 | End: 2025-03-05 | Stop reason: HOSPADM

## 2025-03-04 RX ORDER — ASPIRIN 81 MG/1
81 TABLET ORAL DAILY
Status: DISCONTINUED | OUTPATIENT
Start: 2025-03-04 | End: 2025-03-05 | Stop reason: HOSPADM

## 2025-03-04 RX ORDER — IPRATROPIUM BROMIDE AND ALBUTEROL SULFATE 2.5; .5 MG/3ML; MG/3ML
1 SOLUTION RESPIRATORY (INHALATION) EVERY 4 HOURS PRN
Status: DISCONTINUED | OUTPATIENT
Start: 2025-03-04 | End: 2025-03-05 | Stop reason: HOSPADM

## 2025-03-04 RX ORDER — INSULIN GLARGINE 100 [IU]/ML
5 INJECTION, SOLUTION SUBCUTANEOUS NIGHTLY
Status: DISCONTINUED | OUTPATIENT
Start: 2025-03-04 | End: 2025-03-05 | Stop reason: HOSPADM

## 2025-03-04 RX ADMIN — CLONIDINE HYDROCHLORIDE 0.2 MG: 0.1 TABLET ORAL at 08:37

## 2025-03-04 RX ADMIN — PREGABALIN 75 MG: 75 CAPSULE ORAL at 08:37

## 2025-03-04 RX ADMIN — IPRATROPIUM BROMIDE AND ALBUTEROL SULFATE 3 DOSE: .5; 3 SOLUTION RESPIRATORY (INHALATION) at 01:01

## 2025-03-04 RX ADMIN — WATER 1000 MG: 1 INJECTION INTRAMUSCULAR; INTRAVENOUS; SUBCUTANEOUS at 03:22

## 2025-03-04 RX ADMIN — IPRATROPIUM BROMIDE AND ALBUTEROL SULFATE 1 DOSE: .5; 3 SOLUTION RESPIRATORY (INHALATION) at 06:59

## 2025-03-04 RX ADMIN — HYDRALAZINE HYDROCHLORIDE 5 MG: 20 INJECTION INTRAMUSCULAR; INTRAVENOUS at 15:22

## 2025-03-04 RX ADMIN — HEPARIN SODIUM 3200 UNITS: 1000 INJECTION INTRAVENOUS; SUBCUTANEOUS at 14:37

## 2025-03-04 RX ADMIN — SODIUM CHLORIDE, PRESERVATIVE FREE 10 ML: 5 INJECTION INTRAVENOUS at 20:17

## 2025-03-04 RX ADMIN — HEPARIN SODIUM 5000 UNITS: 5000 INJECTION INTRAVENOUS; SUBCUTANEOUS at 20:15

## 2025-03-04 RX ADMIN — IPRATROPIUM BROMIDE AND ALBUTEROL SULFATE 1 DOSE: .5; 3 SOLUTION RESPIRATORY (INHALATION) at 20:16

## 2025-03-04 RX ADMIN — HEPARIN SODIUM 5000 UNITS: 5000 INJECTION INTRAVENOUS; SUBCUTANEOUS at 14:27

## 2025-03-04 RX ADMIN — AZITHROMYCIN MONOHYDRATE 500 MG: 500 INJECTION, POWDER, LYOPHILIZED, FOR SOLUTION INTRAVENOUS at 03:27

## 2025-03-04 RX ADMIN — SEVELAMER CARBONATE FOR ORAL SUSPENSION 2.4 G: 800 POWDER, FOR SUSPENSION ORAL at 17:58

## 2025-03-04 RX ADMIN — LABETALOL HYDROCHLORIDE 10 MG: 5 INJECTION, SOLUTION INTRAVENOUS at 16:36

## 2025-03-04 RX ADMIN — INSULIN GLARGINE 5 UNITS: 100 INJECTION, SOLUTION SUBCUTANEOUS at 23:12

## 2025-03-04 RX ADMIN — TORSEMIDE 40 MG: 20 TABLET ORAL at 16:44

## 2025-03-04 RX ADMIN — CLONIDINE HYDROCHLORIDE 0.2 MG: 0.1 TABLET ORAL at 14:27

## 2025-03-04 RX ADMIN — SODIUM CHLORIDE, PRESERVATIVE FREE 10 ML: 5 INJECTION INTRAVENOUS at 08:41

## 2025-03-04 RX ADMIN — Medication 1 CAPSULE: at 16:36

## 2025-03-04 RX ADMIN — WATER 125 MG: 1 INJECTION INTRAMUSCULAR; INTRAVENOUS; SUBCUTANEOUS at 01:33

## 2025-03-04 RX ADMIN — ASPIRIN 81 MG: 81 TABLET, COATED ORAL at 08:37

## 2025-03-04 RX ADMIN — CLONIDINE HYDROCHLORIDE 0.2 MG: 0.1 TABLET ORAL at 20:14

## 2025-03-04 RX ADMIN — TIOTROPIUM BROMIDE AND OLODATEROL 2 PUFF: 3.124; 2.736 SPRAY, METERED RESPIRATORY (INHALATION) at 07:08

## 2025-03-04 RX ADMIN — SODIUM CHLORIDE, PRESERVATIVE FREE 10 ML: 5 INJECTION INTRAVENOUS at 08:42

## 2025-03-04 RX ADMIN — VENLAFAXINE 25 MG: 25 TABLET ORAL at 16:44

## 2025-03-04 RX ADMIN — ALPRAZOLAM 0.5 MG: 0.5 TABLET ORAL at 23:04

## 2025-03-04 RX ADMIN — AZITHROMYCIN MONOHYDRATE 500 MG: 500 INJECTION, POWDER, LYOPHILIZED, FOR SOLUTION INTRAVENOUS at 23:24

## 2025-03-04 RX ADMIN — HEPARIN SODIUM 5000 UNITS: 5000 INJECTION INTRAVENOUS; SUBCUTANEOUS at 06:25

## 2025-03-04 RX ADMIN — IPRATROPIUM BROMIDE AND ALBUTEROL SULFATE 1 DOSE: .5; 3 SOLUTION RESPIRATORY (INHALATION) at 16:19

## 2025-03-04 RX ADMIN — BUSPIRONE HYDROCHLORIDE 10 MG: 5 TABLET ORAL at 20:14

## 2025-03-04 RX ADMIN — ALPRAZOLAM 0.5 MG: 0.5 TABLET ORAL at 16:44

## 2025-03-04 RX ADMIN — WATER 2000 MG: 1 INJECTION INTRAMUSCULAR; INTRAVENOUS; SUBCUTANEOUS at 23:16

## 2025-03-04 ASSESSMENT — PAIN - FUNCTIONAL ASSESSMENT: PAIN_FUNCTIONAL_ASSESSMENT: 0-10

## 2025-03-04 ASSESSMENT — LIFESTYLE VARIABLES
HOW OFTEN DO YOU HAVE A DRINK CONTAINING ALCOHOL: NEVER
HOW MANY STANDARD DRINKS CONTAINING ALCOHOL DO YOU HAVE ON A TYPICAL DAY: PATIENT DOES NOT DRINK

## 2025-03-04 ASSESSMENT — ENCOUNTER SYMPTOMS
ABDOMINAL PAIN: 0
NAUSEA: 0
DIARRHEA: 0
CHEST TIGHTNESS: 0
SHORTNESS OF BREATH: 0
VOMITING: 0

## 2025-03-04 ASSESSMENT — PAIN DESCRIPTION - LOCATION: LOCATION: CHEST

## 2025-03-04 ASSESSMENT — PAIN SCALES - GENERAL
PAINLEVEL_OUTOF10: 0
PAINLEVEL_OUTOF10: 8

## 2025-03-04 NOTE — PROGRESS NOTES
Treatment time: 3 hrs    Net UF:  3500 ml     Pre weight: 79.4 kg  Post weight: 76 kg     Access used: Rtdc  Access function:  tolerated well,  [id number]/min     Medications or blood products given: heparin dwells     Regular outpatient schedule: MWF     Summary of response to treatment: Pt tolerated well. Pt remained stable throughout entire treatment and upon exiting the hemodialysis suite.      Copy of dialysis treatment record placed in chart, to be scanned into EMR.

## 2025-03-04 NOTE — PROGRESS NOTES
Resource nurse present at ED room 12.  Patient in hemodialysis.  Admission not initiated at this time.  Will return as time allows to initiate admission process.

## 2025-03-04 NOTE — ED PROVIDER NOTES
[facility] EMERGENCY DEPARTMENT  EMERGENCY DEPARTMENT ENCOUNTER        Pt Name: Kristine Ramirez  MRN: [id number]  Birthdate 1975  Date of evaluation: 3/4/2025  Provider: RIVERA Moore - CNP  PCP: Damon Mireles MD  Note Started: 2:18 AM EST 3/4/25       I have seen and evaluated this patient with my supervising physician Clive Vizcarra MD.      CHIEF COMPLAINT       Chief Complaint   Patient presents with    Extremity Weakness     Pt arrived [facility] ems from home with complaints of generalized weakness, multiple falls, and headache. Hx stroke with right sided deficit        HISTORY OF PRESENT ILLNESS: 1 or more Elements     History From: patient and EMS  Limitations to history : None    Kristine Ramirez is a 49 y.o. female who presents to the ER with generalized weakness and multiple falls today.  Reports that she went to bed Sunday night with migraine and awoke Monday feeling poorly, unable to get up.  Hx of CVA with right sided weakness, but does walk unassisted at baseline.  Today, she is unable to walk.  Denies flu-like symptoms.  Denies any injury from falls.    Denies any headache, fever, lightheadedness, dizziness, visual disturbances.  No chest pain or pressure.  No neck or back pain.  No shortness of breath, cough, or congestion.  No abdominal pain, nausea, vomiting, diarrhea, constipation, or dysuria.  No rash.    Nursing Notes were all reviewed and agreed with or any disagreements were addressed in the HPI.    REVIEW OF SYSTEMS :      Review of Systems   Constitutional:  Positive for fatigue. Negative for activity change, chills and fever.   Respiratory:  Negative for chest tightness and shortness of breath.    Cardiovascular:  Negative for chest pain.   Gastrointestinal:  Negative for abdominal pain, diarrhea, nausea and vomiting.   Genitourinary:  Negative for dysuria.   Neurological:  Positive for weakness.   All other systems reviewed and  as reasonably achievable. COMPARISON: 12/05/2024 HISTORY: ORDERING SYSTEM PROVIDED HISTORY: falls x6 today, h/o stroke with chronic R sided deficits TECHNOLOGIST PROVIDED HISTORY: Reason for exam:->falls x6 today, h/o stroke with chronic R sided deficits Has a \"code stroke\" or \"stroke alert\" been called?->No Decision Support Exception - unselect if not a suspected or confirmed emergency medical condition->Emergency Medical Condition (MA) Reason for Exam: falls x6 today, h/o stroke with chronic R sided deficits Relevant Medical/Surgical History: Extremity Weakness (Pt arrived [facility] ems from home with complaints of generalized weakness, multiple falls, and headache. Hx stroke with right sided deficit ) FINDINGS: BRAIN/VENTRICLES: There is generalized atrophy advanced for a patient of this age.  There is unchanged periventricular and subcortical white matter low attenuation that is nonspecific but most consistent with chronic small vessel ischemia.  In light of the patient's age, other white matter disease is not excluded.  There is no acute intracranial hemorrhage, mass effect or midline shift.  No abnormal extra-axial fluid collection.  The gray-white differentiation is maintained without evidence of an acute infarct.  There is no evidence of hydrocephalus. ORBITS: The visualized portion of the orbits demonstrate no acute abnormality. SINUSES: The visualized paranasal sinuses and mastoid air cells demonstrate no acute abnormality. SOFT TISSUES/SKULL:  No acute abnormality of the visualized skull or soft tissues.     1. No acute intracranial abnormality. 2. Generalized atrophy advanced for a patient of this age. 3. Unchanged periventricular and subcortical white matter low attenuation that is nonspecific but most consistent with chronic small vessel ischemia. In light of the patient's age, other white matter disease is not excluded. Correlate clinically.         ED Provider US Interpretation.    No

## 2025-03-04 NOTE — PROGRESS NOTES
Admission assessment completed. Routine vitals obtained. Scheduled medications given. Patient is awake, alert and oriented. Respirations are easy and unlabored. Patient does not appear to be in distress, resting comfortably at this time. Call light within reach. Fall precautions are in place.

## 2025-03-04 NOTE — PROGRESS NOTES
Admit Date: 3/4/2025  Diet: ADULT DIET; Regular; Low Potassium (Less than 3000 mg/day); Low Phosphorus (Less than 1000 mg)    CC: Pulmonary edema    Interval history:   Overnight, there were no acute events. Patient's vitals remained stable    Patient was seen this morning. I discussed the plan of the day with her in detail. All questions were addressed and answered to patient's satisfaction.  Patient endorses some shortness of breath.  Patient denies fevers, chills, nausea, vomiting, chest pain, diarrhea, constipation, dysuria, urinary frequency or urgency.     Plan:     -HD sessions per nephro  -Wean off O2 as tolerated  -In light of elevated Pro-Bruno at 3.08, start patient on IV azithromycin + Rocephin  -Probiotics  -Strep pneumo, Legionella, respiratory culture    Assessment:   Kristine Ramirez is a 49 y.o. female with PMH of ESRD on HD, T2DM, Tobacco abuse, HTN who was admitted with pulmonary edema    Pulmonary edema  Patient presented with worsening shortness of breath.  She endorsed that she missed a day of dialysis because she felt poorly.  On admission, CXR showed pulmonary edema with trace effusions.  -Nephrology consulted    COPD  At home, patient is on Xopenex, albuterol, umeclidinium-vilanterol     ESRD on HD  Patient follows with nephrology in the outpatient setting.  She does HD through a fistula.    HTN  At home, patient is on amlodipine, clonidine    T2DM  At home, patient is on insulin and dulaglutide    Tobacco Abuse  Patient endorses tobacco use.  I spent at least 4 minutes discussing the importance of tobacco cessation in light of all the complications related to tobacco abuse.  Patient voiced understanding and will work on cutting back and/or quitting completely.     Code Status: Full Code   FEN: ADULT DIET; Regular; Low Potassium (Less than 3000 mg/day); Low Phosphorus (Less than 1000 mg)   DVT PPX: []Lovenox, [x]Heparin, []Coumadin, []Eliquis, []Xarelto, []SCD  DISPO Pending: HD  History    LABS:    CBC:   Recent Labs     03/04/25 0035 03/04/25  0835   WBC 10.5 9.3   HGB 12.6 12.1   HCT 39.0 36.2   * 130*   MCV 91.4 88.8     Renal:    Recent Labs     03/04/25 0035 03/04/25  0835   * 134*   K 5.4* 5.8*   CL 88* 88*   CO2 20* 21   BUN 79* 85*   CREATININE 6.6* 7.0*   GLUCOSE 151* 228*   CALCIUM 9.0 8.8   ANIONGAP 24* 25*     Hepatic:   Recent Labs     03/04/25 0035   AST 26   ALT 11   BILITOT 1.6*   ALKPHOS 214*     Troponin: No results for input(s): \"TROPONINI\" in the last 72 hours.  BNP: No results for input(s): \"BNP\" in the last 72 hours.  Lipids: No results for input(s): \"CHOL\", \"HDL\" in the last 72 hours.    Invalid input(s): \"LDLCALCU\", \"TRIGLYCERIDE\"  ABGs:  No results for input(s): \"PHART\", \"RXL4MMK\", \"PO2ART\", \"NMC2YGB\", \"BEART\", \"THGBART\", \"H8LHEUSS\", \"HMR8TXP\" in the last 72 hours.    INR:   Recent Labs     03/04/25 0035   INR 1.38*     Lactate: No results for input(s): \"LACTATE\" in the last 72 hours.  Cultures:  -----------------------------------------------------------------  RAD:   XR CHEST PORTABLE   Preliminary Result   Pulmonary edema with trace effusions.         CT HEAD WO CONTRAST   Final Result   1. No acute intracranial abnormality.   2. Generalized atrophy advanced for a patient of this age.   3. Unchanged periventricular and subcortical white matter low attenuation   that is nonspecific but most consistent with chronic small vessel ischemia.   In light of the patient's age, other white matter disease is not excluded.   Correlate clinically.             Medications:     Scheduled Meds:   aspirin  81 mg Oral Daily    busPIRone  10 mg Oral BID    cloNIDine  0.2 mg Oral TID    venlafaxine  25 mg Per NG tube TID WC    insulin glargine  5 Units SubCUTAneous Nightly    pantoprazole  40 mg Oral BID AC    pregabalin  75 mg Oral Daily    sevelamer  2.4 g Oral TID WC    tiotropium-olodaterol  2 puff Inhalation Daily    torsemide  40 mg Oral Daily    sodium

## 2025-03-04 NOTE — ED PROVIDER NOTES
I personally saw the patient and made/approved the management plan and take responsibility for the patient management.    For further details of Kristine Ramirez's emergency department encounter, please see the advanced practice provider's documentation.    I, Clive Vizcarra MD, am the primary physician provider of record.    CHIEF COMPLAINT  Chief Complaint   Patient presents with    Extremity Weakness     Pt arrived [facility] ems from home with complaints of generalized weakness, multiple falls, and headache. Hx stroke with right sided deficit        Briefly, Kristine Ramirez is a 49 y.o. female  who presents to the ED complaining of reports of chest pain about 3 days or so.  She says she has also fallen a lot including 6 falls today in particular, sliding off of the bed to the ground every time she tries to get up.  She has chronic R arm and leg weakness due to previous stroke about 4 years ago.  She is not anticoagulated.  She feels short of breath for a few days.  She says she has a headache too.  No abd pains, vomiting or diarrhea.  She says she feels generally weak, but her R side is not acutely more weak than baseline, and no acute focal L sided weakness reported.  No numbness anywhere.  She was 80% on room air on arrival with no baseline oxygen requirement.  Patient is a dialysis patient who did not go today.    FOCUSED PHYSICAL EXAMINATION  BP (!) 168/94   Pulse (!) 103   Temp 97.8 °F (36.6 °C) (Oral)   Resp 21   Ht 1.702 m (5' 7\")   Wt 79.4 kg (175 lb)   LMP 09/01/2022   SpO2 100%   BMI 27.41 kg/m²    Focused physical examination notable for No acute distress, well-appearing, well-nourished, normal speech and mentation without obvious facial droop, no obvious rash.  No obvious cranial nerve deficits on my initial exam.  Chronic right arm and right leg weakness noted, normal strength and sensation in the left arm and left leg.  Regular rhythm, mild tachycardia.  Mild  solution 3 Dose (3 Doses Inhalation Given 3/4/25 0101)   methylPREDNISolone sodium succ (SOLU-MEDROL) 125 mg in sterile water 2 mL injection (125 mg IntraVENous Given 3/4/25 0133)        CLINICAL IMPRESSION  1. Pneumonia due to infectious organism, unspecified laterality, unspecified part of lung    2. Acute hypoxemic respiratory failure (HCC)    3. Frequent falls    4. ESRD on hemodialysis (HCC)    5. General weakness    6. Acute pulmonary edema (HCC)        Blood pressure (!) 168/94, pulse (!) 103, temperature 97.8 °F (36.6 °C), temperature source Oral, resp. rate 21, height 1.702 m (5' 7\"), weight 79.4 kg (175 lb), last menstrual period 09/01/2022, SpO2 100%.    DISPOSITION  Kristine Ramirez was admitted in fair condition.    The plan is to admit to the hospital at this time under the hospitalist service.  Hospitalist accepted the patient and will take over the patient's care.    Critical care time:  The total critical care time I independently spent while evaluating and treating this patient was 40 minutes.  This excludes time spent doing separately billable procedures.  This includes time at the bedside, data interpretation, medication management, obtaining critical history from collateral sources if the patient is unable to provide it directly, and physician consultation.  Specifics of interventions taken and potentially life-threatening diagnostic considerations are listed above in the medical decision making.  If this was a shared visit with an KANDY, the time in this attestation is non-concurrent critical care time out of the total shared critical care time provided by the KANDY and myself.    DISCLAIMER: This chart was created using Dragon dictation software.  Efforts were made by me to ensure accuracy, however some errors may be present due to limitations of this technology and occasionally words are not transcribed correctly.        Clive Vizcarra MD  03/04/25 3743

## 2025-03-04 NOTE — ED NOTES
Pt placed on bed pan to attempt to obtain urine sample, RN was not successful. Pt states she doesn't make much urine

## 2025-03-04 NOTE — PROGRESS NOTES
4 Eyes Skin Assessment     NAME:  Kristine Ramirez  YOB: 1975  MEDICAL RECORD NUMBER:  [id number]    The patient is being assessed for  Admission    I agree that at least one RN has performed a thorough Head to Toe Skin Assessment on the patient. ALL assessment sites listed below have been assessed.      Areas assessed by both nurses:    Head, Face, Ears, Shoulders, Back, Chest, Arms, Elbows, Hands, Sacrum. Buttock, Coccyx, Ischium, and Legs. Feet and Heels        Does the Patient have a Wound? No noted wound(s)       Robbie Prevention initiated by RN: Yes  Wound Care Orders initiated by RN: No    Pressure Injury (Stage 3,4, Unstageable, DTI, NWPT, and Complex wounds) if present, place Wound referral order by RN under : No    New Ostomies, if present place, Ostomy referral order under : No     Nurse 1 eSignature: Electronically signed by Lori Kincaid RN on 3/4/25 at 4:26 PM EST    **SHARE this note so that the co-signing nurse can place an eSignature**    Nurse 2 eSignature: Electronically signed by Margoth Pollard RN on 3/5/25 at 1:20 AM EST

## 2025-03-04 NOTE — CONSULTS
follow along with you.       Isaac Coon MD  [facility]  Office: [phone] or Via [employer]  Fax: [phone]      ===========================================  ===========================================      CHIEF COMPLAINT:   Chief Complaint   Patient presents with    Extremity Weakness     Pt arrived [facility] ems from home with complaints of generalized weakness, multiple falls, and headache. Hx stroke with right sided deficit      History Obtained From:  patient + treatment team + Electronic Medical Records, as needed for my evaluation.   HPI: Ms. Ramirez is a 49 y.o. female with significant past medical history of End stage renal disease, and   Past Medical History:   Diagnosis Date    Acute respiratory failure due to COVID-19 ([facility]) 10/16/2021    Arterial ischemic stroke, ICA, left, acute ([facility])     Blind in both eyes     Cerebral artery occlusion with cerebral infarction ([facility])     CHF (congestive heart failure) ([facility])     Chronic kidney disease     COPD (chronic obstructive pulmonary disease) ([facility])     Depression     Diabetes mellitus out of control     Diabetes mellitus, type II ([facility])     2005    Diabetic neuropathy associated with type 2 diabetes mellitus ([facility])     Generalized headaches     Hypertension     Infertility     Insomnia     chronic vs lack of time spent to sleep    Migraine headache 11/09/2011    Mixed hyperlipidemia     Otitis media     h/o recurrent    Pelvic abscess in female 10/05/2013    Pneumonia     2004 approx.    Stroke ([facility]) 08/27/2020    Stroke ([facility]) 08/27/2021    ,   presents with Extremity Weakness (Pt arrived [facility] ems from home with complaints of generalized weakness, multiple falls, and headache. Hx stroke with right sided deficit )  Admitted with   Hospital Problems             Last Modified POA    * (Principal) Pneumonia, bacterial 3/4/2025 Yes     We are called for ESRD care.  Patient reported that she has not            ======================================================================  Please note that this chart entry has been generated using voice recognition software, mainly.  So please excuse brevity and/or typos.  While every effort and attempts have been made to ensure the accuracy of this automated transcription, some errors may have occurred; and certain words and phrases in transcription may not be entered as intended.  However, inadvertent computerized transcription errors may be present.  So please contact us if any clarification needed.

## 2025-03-04 NOTE — H&P
results.  Nephrology consultation to resume dialysis and for possible early treatment later today.  Continue supplemental oxygen via nasal cannula and wean oxygen as tolerated.  Troponin is chronically elevated in the setting of ESRD  Add DuoNeb.  Resume the rest of home medication as appropriate.  Start low-dose Lantus and sliding scale insulin  PT/OT evaluation and treatment.  SQ heparin for DVT prophylaxis.  CODE STATUS: Full code    Physical Exam Performed:      BP (!) 164/85   Pulse (!) 102   Temp 97.8 °F (36.6 °C) (Oral)   Resp 12   Ht 1.702 m (5' 7\")   Wt 79.4 kg (175 lb)   LMP 09/01/2022   SpO2 98%   BMI 27.41 kg/m²     General appearance: Overweight, chronically ill-appearing, no apparent distress, appears stated age and cooperative.  HEENT:  Pupils equal, round, and reactive to light. Conjunctivae/corneas clear.  Respiratory:  Normal respiratory effort.  Diminished breath sounds bilaterally clear to auscultation, bilaterally without Rales/Wheezes/Rhonchi.  Cardiovascular:  Regular rate and rhythm with normal S1/S2 without murmurs, rubs or gallops.  Abdomen:  Soft, non-tender, non-distended with normal bowel sounds.  Musculoskelatal:  No clubbing, cyanosis with trace edema bilaterally.  Full range of motion without deformity.  Neurologic:  Neurovascularly intact without any focal sensory/motor deficits. Cranial nerves: II-XII intact, grossly non-focal.  Psychiatric:  Alert and oriented, thought content appropriate, normal insight  Skin:  Skin color, texture, turgor normal.  No rashes or lesions.  Capillary Refill:  Brisk,< 3 seconds   Peripheral Pulses:  +2 palpable, equal bilaterally         --------------------------------------------------------------------------------------------------------------------------------------------------------------------    Imaging:     XR CHEST PORTABLE    Result Date: 3/4/2025  Pulmonary edema with trace effusions.     CT HEAD WO CONTRAST    Result Date:  MCG/ACT inhaler Inhale 2 puffs into the lungs every 6 hours as needed for Wheezing or Shortness of Breath 7/6/23   Damon Mireles MD   Handicap Placard MISC by Does not apply route Expires on 5/18/2028 5/18/23   Damon Mireles MD       Labs: Personally reviewed and interpreted for clinical significance.   Recent Labs     03/04/25 0035   WBC 10.5   HGB 12.6   HCT 39.0   *     Recent Labs     03/04/25 0035   *   K 5.4*   CL 88*   CO2 20*   BUN 79*   CREATININE 6.6*   CALCIUM 9.0     Recent Labs     03/04/25 0035 03/04/25  0133   PROBNP >70,000*  --    TROPHS 337* 324*     No results for input(s): \"LABA1C\" in the last 72 hours.  Recent Labs     03/04/25 0035   AST 26   ALT 11   BILITOT 1.6*   ALKPHOS 214*     Recent Labs     03/04/25 0035   INR 1.38*        Fallon Marcus MD

## 2025-03-04 NOTE — PLAN OF CARE
Problem: Chronic Conditions and Co-morbidities  Goal: Patient's chronic conditions and co-morbidity symptoms are monitored and maintained or improved  Outcome: Progressing     Problem: Skin/Tissue Integrity  Goal: Skin integrity remains intact  Description: 1.  Monitor for areas of redness and/or skin breakdown  2.  Assess vascular access sites hourly  3.  Every 4-6 hours minimum:  Change oxygen saturation probe site  4.  Every 4-6 hours:  If on nasal continuous positive airway pressure, respiratory therapy assess nares and determine need for appliance change or resting period  Outcome: Progressing     Problem: Safety - Adult  Goal: Free from fall injury  Outcome: Progressing

## 2025-03-04 NOTE — ACP (ADVANCE CARE PLANNING)
Advanced Care Planning Note    Purpose of Encounter: Advanced care planning in light of pulmonary edema  Parties In Attendance: Patient, Jeffy Barriga MD  Decisional Capacity: Yes  Subjective: Patient has SOB  Objective: Cr 7.0  Goals of Care Determination: Patient wants full support  Plan:  HD sessions, IV Abx  Code Status: Full   Time spent on Advanced care Plannin minutes  Advanced Care Planning Documents: Completed advanced directives on chart, Kannan, is the Healthcare POA.    Jeffy Barriga MD  3/4/2025 1:41 PM

## 2025-03-05 VITALS
OXYGEN SATURATION: 96 % | DIASTOLIC BLOOD PRESSURE: 80 MMHG | HEART RATE: 100 BPM | SYSTOLIC BLOOD PRESSURE: 173 MMHG | WEIGHT: 163.5 LBS | TEMPERATURE: 97.3 F | HEIGHT: 67 IN | BODY MASS INDEX: 25.66 KG/M2 | RESPIRATION RATE: 18 BRPM

## 2025-03-05 LAB
ALBUMIN SERPL-MCNC: 3.9 G/DL (ref 3.4–5)
ANION GAP SERPL CALCULATED.3IONS-SCNC: 20 MMOL/L (ref 3–16)
BASOPHILS # BLD: 0 K/UL (ref 0–0.2)
BASOPHILS NFR BLD: 0.3 %
BUN SERPL-MCNC: 69 MG/DL (ref 7–20)
CALCIUM SERPL-MCNC: 9.2 MG/DL (ref 8.3–10.6)
CHLORIDE SERPL-SCNC: 92 MMOL/L (ref 99–110)
CO2 SERPL-SCNC: 21 MMOL/L (ref 21–32)
CREAT SERPL-MCNC: 5.2 MG/DL (ref 0.6–1.1)
DEPRECATED RDW RBC AUTO: 19.8 % (ref 12.4–15.4)
EOSINOPHIL # BLD: 0 K/UL (ref 0–0.6)
EOSINOPHIL NFR BLD: 0 %
GFR SERPLBLD CREATININE-BSD FMLA CKD-EPI: 10 ML/MIN/{1.73_M2}
GLUCOSE BLD-MCNC: 127 MG/DL (ref 70–99)
GLUCOSE BLD-MCNC: 160 MG/DL (ref 70–99)
GLUCOSE SERPL-MCNC: 188 MG/DL (ref 70–99)
HCT VFR BLD AUTO: 38.3 % (ref 36–48)
HGB BLD-MCNC: 12.3 G/DL (ref 12–16)
LYMPHOCYTES # BLD: 0.6 K/UL (ref 1–5.1)
LYMPHOCYTES NFR BLD: 6.3 %
MAGNESIUM SERPL-MCNC: 2.36 MG/DL (ref 1.8–2.4)
MCH RBC QN AUTO: 28.8 PG (ref 26–34)
MCHC RBC AUTO-ENTMCNC: 32 G/DL (ref 31–36)
MCV RBC AUTO: 90 FL (ref 80–100)
MONOCYTES # BLD: 0.9 K/UL (ref 0–1.3)
MONOCYTES NFR BLD: 9.2 %
NEUTROPHILS # BLD: 8.1 K/UL (ref 1.7–7.7)
NEUTROPHILS NFR BLD: 84.2 %
PERFORMED ON: ABNORMAL
PERFORMED ON: ABNORMAL
PHOSPHATE SERPL-MCNC: 7.4 MG/DL (ref 2.5–4.9)
PLATELET # BLD AUTO: 164 K/UL (ref 135–450)
PMV BLD AUTO: 8.7 FL (ref 5–10.5)
POTASSIUM SERPL-SCNC: 5.2 MMOL/L (ref 3.5–5.1)
RBC # BLD AUTO: 4.25 M/UL (ref 4–5.2)
SODIUM SERPL-SCNC: 133 MMOL/L (ref 136–145)
WBC # BLD AUTO: 9.7 K/UL (ref 4–11)

## 2025-03-05 PROCEDURE — 2500000003 HC RX 250 WO HCPCS: Performed by: INTERNAL MEDICINE

## 2025-03-05 PROCEDURE — 6360000002 HC RX W HCPCS: Performed by: INTERNAL MEDICINE

## 2025-03-05 PROCEDURE — 90935 HEMODIALYSIS ONE EVALUATION: CPT

## 2025-03-05 PROCEDURE — 94761 N-INVAS EAR/PLS OXIMETRY MLT: CPT

## 2025-03-05 PROCEDURE — 96376 TX/PRO/DX INJ SAME DRUG ADON: CPT

## 2025-03-05 PROCEDURE — 96372 THER/PROPH/DIAG INJ SC/IM: CPT

## 2025-03-05 PROCEDURE — 83735 ASSAY OF MAGNESIUM: CPT

## 2025-03-05 PROCEDURE — G0378 HOSPITAL OBSERVATION PER HR: HCPCS

## 2025-03-05 PROCEDURE — 97116 GAIT TRAINING THERAPY: CPT

## 2025-03-05 PROCEDURE — 80069 RENAL FUNCTION PANEL: CPT

## 2025-03-05 PROCEDURE — 36415 COLL VENOUS BLD VENIPUNCTURE: CPT

## 2025-03-05 PROCEDURE — 94640 AIRWAY INHALATION TREATMENT: CPT

## 2025-03-05 PROCEDURE — 97162 PT EVAL MOD COMPLEX 30 MIN: CPT

## 2025-03-05 PROCEDURE — 6370000000 HC RX 637 (ALT 250 FOR IP): Performed by: STUDENT IN AN ORGANIZED HEALTH CARE EDUCATION/TRAINING PROGRAM

## 2025-03-05 PROCEDURE — 6370000000 HC RX 637 (ALT 250 FOR IP): Performed by: INTERNAL MEDICINE

## 2025-03-05 PROCEDURE — 97166 OT EVAL MOD COMPLEX 45 MIN: CPT

## 2025-03-05 PROCEDURE — 97530 THERAPEUTIC ACTIVITIES: CPT

## 2025-03-05 PROCEDURE — 6360000002 HC RX W HCPCS: Performed by: STUDENT IN AN ORGANIZED HEALTH CARE EDUCATION/TRAINING PROGRAM

## 2025-03-05 PROCEDURE — 85025 COMPLETE CBC W/AUTO DIFF WBC: CPT

## 2025-03-05 RX ORDER — METRONIDAZOLE 500 MG/1
500 TABLET ORAL 3 TIMES DAILY
Qty: 15 TABLET | Refills: 0 | Status: SHIPPED | OUTPATIENT
Start: 2025-03-06 | End: 2025-03-11

## 2025-03-05 RX ORDER — PANTOPRAZOLE SODIUM 40 MG/1
40 TABLET, DELAYED RELEASE ORAL
Qty: 180 TABLET | Refills: 0 | Status: SHIPPED | OUTPATIENT
Start: 2025-03-05 | End: 2025-06-03

## 2025-03-05 RX ORDER — LACTOBACILLUS RHAMNOSUS GG 10B CELL
1 CAPSULE ORAL 2 TIMES DAILY WITH MEALS
Qty: 12 CAPSULE | Refills: 0 | Status: SHIPPED | OUTPATIENT
Start: 2025-03-06 | End: 2025-03-12

## 2025-03-05 RX ADMIN — IPRATROPIUM BROMIDE AND ALBUTEROL SULFATE 1 DOSE: .5; 3 SOLUTION RESPIRATORY (INHALATION) at 12:20

## 2025-03-05 RX ADMIN — HEPARIN SODIUM 5000 UNITS: 5000 INJECTION INTRAVENOUS; SUBCUTANEOUS at 06:02

## 2025-03-05 RX ADMIN — VENLAFAXINE 25 MG: 25 TABLET ORAL at 12:14

## 2025-03-05 RX ADMIN — ALPRAZOLAM 0.5 MG: 0.5 TABLET ORAL at 12:14

## 2025-03-05 RX ADMIN — ASPIRIN 81 MG: 81 TABLET, COATED ORAL at 12:14

## 2025-03-05 RX ADMIN — LABETALOL HYDROCHLORIDE 10 MG: 5 INJECTION, SOLUTION INTRAVENOUS at 04:30

## 2025-03-05 RX ADMIN — SODIUM CHLORIDE, PRESERVATIVE FREE 10 ML: 5 INJECTION INTRAVENOUS at 12:14

## 2025-03-05 RX ADMIN — IPRATROPIUM BROMIDE AND ALBUTEROL SULFATE 1 DOSE: .5; 3 SOLUTION RESPIRATORY (INHALATION) at 16:31

## 2025-03-05 RX ADMIN — BUSPIRONE HYDROCHLORIDE 10 MG: 5 TABLET ORAL at 12:14

## 2025-03-05 RX ADMIN — CLONIDINE HYDROCHLORIDE 0.2 MG: 0.1 TABLET ORAL at 12:14

## 2025-03-05 RX ADMIN — TORSEMIDE 40 MG: 20 TABLET ORAL at 12:14

## 2025-03-05 RX ADMIN — HYDRALAZINE HYDROCHLORIDE 5 MG: 20 INJECTION INTRAMUSCULAR; INTRAVENOUS at 03:30

## 2025-03-05 RX ADMIN — IPRATROPIUM BROMIDE AND ALBUTEROL SULFATE 1 DOSE: .5; 3 SOLUTION RESPIRATORY (INHALATION) at 00:54

## 2025-03-05 RX ADMIN — SEVELAMER CARBONATE FOR ORAL SUSPENSION 2.4 G: 800 POWDER, FOR SUSPENSION ORAL at 12:18

## 2025-03-05 RX ADMIN — PREGABALIN 75 MG: 75 CAPSULE ORAL at 12:14

## 2025-03-05 RX ADMIN — TIOTROPIUM BROMIDE AND OLODATEROL 2 PUFF: 3.124; 2.736 SPRAY, METERED RESPIRATORY (INHALATION) at 12:21

## 2025-03-05 NOTE — PROGRESS NOTES
Physical Therapy    [facility] - Inpatient Rehabilitation Department   Phone: [phone]    Physical Therapy    [x] Initial Evaluation            [] Daily Treatment Note         [] Discharge Summary      Patient: Kristine Ramirez   : 1975   MRN: [id number]   Date of Service:  3/5/2025  Admitting Diagnosis: Pneumonia, bacterial  Current Admission Summary: Per internal medicine: \"Kristine Ramirez is a 49 y.o. female with PMHx of ESRD on HD, T2DM, Tobacco abuse, HTN who was admitted with pulmonary edema.  Her pulmonary edema was found to be due to the fact that she missed a couple sessions of dialysis.  During admission, she underwent 2 sessions of dialysis.  Patient was discharged in stable fashion.  She had an elevated procalcitonin on admission and was started on antibiotics for any possible PNA.  Patient was discharged follow-up with PCP and nephrology in the outpatient setting.\"  Past Medical History:  has a past medical history of Acute respiratory failure due to COVID-19 ([facility]), Arterial ischemic stroke, ICA, left, acute ([facility]), Blind in both eyes, Cerebral artery occlusion with cerebral infarction ([facility]), CHF (congestive heart failure) ([facility]), Chronic kidney disease, COPD (chronic obstructive pulmonary disease) ([facility]), Depression, Diabetes mellitus out of control, Diabetes mellitus, type II (HCC), Diabetic neuropathy associated with type 2 diabetes mellitus (HCC), Generalized headaches, Hypertension, Infertility, Insomnia, Migraine headache, Mixed hyperlipidemia, Otitis media, Pelvic abscess in female, Pneumonia, Stroke (HCC), and Stroke (HCC).  Past Surgical History:  has a past surgical history that includes Cervix surgery; eye surgery; Foot surgery (Right); Foot surgery (Bilateral); Foot surgery (Left); IR TUNNELED CVC PLACE WO SQ PORT/PUMP > 5 YEARS (2021); Dialysis fistula creation (Right, 2/10/2022); Upper gastrointestinal endoscopy (N/A, 2024); and Upper gastrointestinal  training, pain management, home exercise program, safety education, and positioning    Goals  Patient Goals: to go home    Short Term Goals:  Time Frame: discharge  Patient will complete bed mobility at [facility] independent   Patient will complete transfers at supervision   Patient will ambulate 150 ft with use of LRAD at supervision    Above goals reviewed on 3/5/2025.  All goals are ongoing at this time unless indicated above.      Therapy Session Time      Individual Group Co-treatment   Time In     1326   Time Out     1355   Minutes     29     Timed Code Treatment Minutes:  14 Minutes  Total Treatment Minutes:  29       Electronically Signed By: Juli Martinez, PT, Juli Martinez PT, DPT [id number]

## 2025-03-05 NOTE — CARE COORDINATION
Case Management -  Discharge Note      Patient Name: Kristine Ramirez                   YOB: 1975  Room: [facility]5563/5563-01            Readmission Risk (Low < 19, Mod (19-27), High > 27): Readmission Risk Score: 20.6    Current PCP: Damon Mireles MD      (IMM) Important Message from Medicare:    Has pt received appropriate compliance notices before being discharged if required: N/A  Compliance doc:  [] 2nd IMM; [] Code 44 [] Morgan  Date Given:  Given By:     PT AM-PAC:   /24  OT AM-PAC: 15 /24    Patient/patient representative has been educated on the benefits of [facility] as well as the possible risks of declining recommended services. Patient/patient representative has acknowledged the information provided and decided on the following discharge plan. Patient/ patient representative has been provided freedom of choice regarding service provider, supported by basic dialogue that supports the patient's individualized plan of care/goals.    [facility] agency notified of discharge:  [] Yes [] No  [x] NA    Family notified of discharge:  [] Yes  [] No  [x] NA    Facility notified of discharge:  [] Yes  [] No  [x] NA    Pt is being discharged with Outpt IV Antibiotics  [] Yes [x] No  [] NA  If yes, make sure SARAH is faxed to [facility] agency, and meds are called in to pharmacy by RN from SARAH orders only.      [address] [address]  Phone: [phone]   Fax: [phone]  Patient Schedule: M-W-F     Financial    Payor: [facility] / Plan: [facility] DUAL / Product Type: *No Product type* /     Pharmacy:  Potential assistance Purchasing Medications:    Meds-to-Beds request: Yes      [id number] [place], [place] - [address] - P [phone] - F [phone]  [address]  [facility] [address]  Phone: [phone] Fax: [phone]    [facility] [id number] - [place], [place] - [address] P [phone] - f [phone]  [address]  [address] [place] [address]  Phone: [phone] Fax: [phone]      Notes:    Additional Case Management Notes: Patient will discharge home today. Patient declined SNF and HHC. [facility]. No further needs at this time.    Electronically signed by Zahraa Garcia on 3/5/25 at 3:12 PM EST

## 2025-03-05 NOTE — PROGRESS NOTES
Treatment time: 3 hours  Net UF: 2500 ml     Pre weight: 74.7 kg  Post weight:72.2 kg    Access used: RTDC    Access function: well with  ml/min     Medications or blood products given: NA     Regular outpatient schedule: MWF     Summary of response to treatment: Patient tolerated treatment well and without any complications. Patient remained stable throughout entire treatment.

## 2025-03-05 NOTE — PROGRESS NOTES
CLINICAL PHARMACY NOTE: MEDS TO BEDS    Total # of Prescriptions Filled: 3   The following medications were delivered to the patient:  ACIDOPHILUS/CITRUS PECTIN TABS  PANTOPRAZOLE SODIUM 40MG TBEC  METRONIDAZOLE 500MG TABS    Additional Documentation: Kirsty LEACH approved to deliver medications to patient room=signed  [facility]

## 2025-03-05 NOTE — DISCHARGE SUMMARY
V2.0  Discharge Summary    Name:  Kristine Ramirez /Age/Sex: 1975 (49 y.o. female)   Admit Date: 3/4/2025  Discharging Provider: Jeffy Barriga MD   MRN & CSN:  [id number] & [id number] Attending:  Jeffy Barriga MD       Brief HPI: Kristine Ramirez is a 49 y.o. female with PMHx of ESRD on HD, T2DM, Tobacco abuse, HTN who was admitted with pulmonary edema.  Her pulmonary edema was found to be due to the fact that she missed a couple sessions of dialysis.  During admission, she underwent 2 sessions of dialysis.  Patient was discharged in stable fashion.  She had an elevated procalcitonin on admission and was started on antibiotics for any possible PNA.  Patient was discharged follow-up with PCP and nephrology in the outpatient setting.     Brief Problem Focused Hospital Course:     Pulmonary edema  Patient presented with worsening shortness of breath.  She endorsed that she missed a day of dialysis because she felt poorly.  On admission, CXR showed pulmonary edema with trace effusions.  -Nephrology consulted     COPD  At home, patient is on Xopenex, albuterol, umeclidinium-vilanterol      ESRD on HD  Patient follows with nephrology in the outpatient setting.  She does HD through a fistula.     HTN  At home, patient is on amlodipine, clonidine     T2DM  At home, patient is on insulin and dulaglutide     Tobacco Abuse  Patient endorses tobacco use.  I spent at least 4 minutes discussing the importance of tobacco cessation in light of all the complications related to tobacco abuse.  Patient voiced understanding and will work on cutting back and/or quitting completely.     The patient expressed appropriate understanding of, and agreement with the discharge recommendations, medications, and plan.     Consults this admission:  IP CONSULT TO NEPHROLOGY    Discharge Instruction:   Primary care physician: Damon Mireels MD within 2 weeks  Disposition: Discharged to: [x]Home, []HHC, []SNF, []Acute Rehab,  PROVIDED HISTORY: chest pain sob hypoxia TECHNOLOGIST PROVIDED HISTORY: Reason for exam:->chest pain sob hypoxia Reason for Exam: chest pain sob hypoxia FINDINGS: Right internal jugular central venous catheter tip terminates at the caval-atrial junction.  The cardiomediastinal silhouette is enlarged. Pulmonary edema is present with trace effusions.  No pneumothorax. No acute osseous abnormality.     Pulmonary edema with trace effusions.     CT HEAD WO CONTRAST    Result Date: 3/4/2025  EXAMINATION: CT OF THE HEAD WITHOUT CONTRAST  3/4/2025 12:40 am TECHNIQUE: CT of the head was performed without the administration of intravenous contrast. Automated exposure control, iterative reconstruction, and/or weight based adjustment of the mA/kV was utilized to reduce the radiation dose to as low as reasonably achievable. COMPARISON: 12/05/2024 HISTORY: ORDERING SYSTEM PROVIDED HISTORY: falls x6 today, h/o stroke with chronic R sided deficits TECHNOLOGIST PROVIDED HISTORY: Reason for exam:->falls x6 today, h/o stroke with chronic R sided deficits Has a \"code stroke\" or \"stroke alert\" been called?->No Decision Support Exception - unselect if not a suspected or confirmed emergency medical condition->Emergency Medical Condition (MA) Reason for Exam: falls x6 today, h/o stroke with chronic R sided deficits Relevant Medical/Surgical History: Extremity Weakness (Pt arrived [facility] ems from home with complaints of generalized weakness, multiple falls, and headache. Hx stroke with right sided deficit ) FINDINGS: BRAIN/VENTRICLES: There is generalized atrophy advanced for a patient of this age.  There is unchanged periventricular and subcortical white matter low attenuation that is nonspecific but most consistent with chronic small vessel ischemia.  In light of the patient's age, other white matter disease is not excluded.  There is no acute intracranial hemorrhage, mass effect or midline shift.  No abnormal extra-axial

## 2025-03-05 NOTE — DISCHARGE INSTRUCTIONS
Please call and make an appointment with your PCP within 1 week; If you do not have a PCP please make an appointment with the [facility] outpatient resident clinic by calling [phone]  Please call and make an appointment with Nephrology  Please take all your medications as prescribed including any new ones on discharge

## 2025-03-05 NOTE — CARE COORDINATION
Case Management Assessment  Initial Evaluation    Date/Time of Evaluation: 3/5/2025 3:14 PM  Assessment Completed by: Zahraa Garcia    If patient is discharged prior to next notation, then this note serves as note for discharge by case management.    Patient Name: Kristine Ramirez                   YOB: 1975  Diagnosis: Pneumonia, bacterial [J15.9]  Acute pulmonary edema (HCC) [J81.0]  General weakness [R53.1]  ESRD on hemodialysis (HCC) [N18.6, Z99.2]  Frequent falls [R29.6]  Acute hypoxemic respiratory failure (HCC) [J96.01]  Pneumonia due to infectious organism, unspecified laterality, unspecified part of lung [J18.9]                   Date / Time: 3/4/2025 12:14 AM    Patient Admission Status: Inpatient   Readmission Risk (Low < 19, Mod (19-27), High > 27): Readmission Risk Score: 20.6    Current PCP: Damon Mireles MD  PCP verified by CM? Yes    Chart Reviewed: Yes      History Provided by: Patient  Patient Orientation: Alert and Oriented    Patient Cognition: Alert    Hospitalization in the last 30 days (Readmission):  No    If yes, Readmission Assessment in CM Navigator will be completed.    Advance Directives:      Code Status: Full Code   Patient's Primary Decision Maker is: Legal Next of Kin    Primary Decision Maker: Kannan Ramirez - Spouse - [phone]    Discharge Planning:    Patient lives with: Spouse/Significant Other Type of Home: Other (Comment) (Condo)  Primary Care Giver: Self  Patient Support Systems include: Spouse/Significant Other   Current Financial resources: Medicare, Medicaid  Current community resources: None  Current services prior to admission: None            Current DME:              Type of Home Care services:  None    ADLS  Prior functional level: Assistance with the following:, Bathing, Cooking, Housework, Shopping  Current functional level: Assistance with the following:, Bathing, Dressing, Mobility    PT AM-PAC:   /24  OT AM-PAC: 15 /24    Family can  representative Kristine and her family were provided with a choice of provider and agrees with the discharge plan. Freedom of choice list with basic dialogue that supports the patient's individualized plan of care/goals and shares the quality data associated with the providers was provided to: Patient   Patient Representative Name:       The Patient and/or Patient Representative Agree with the Discharge Plan? Yes    Zahraa Garcia  Case Management Department  Ph: [phone]

## 2025-03-05 NOTE — PROGRESS NOTES
Physical Therapy/Occupational Therapy   Attempt Note    Name:Kristine Ramirez  :1975  MRN:[id number]  Room: [facility]-5563/5563-01    Date of Service: 3/5/2025    PT/OT orders received/acknowledged, Patient chart reviewed. PT/OT attempted to see for PT/OT eval at this time. Pt currently DAWNA for dialysis. Will attempt again as therapy schedule permits and as medically appropriate.              Electronically signed by Juli Martinez PT on 3/5/2025 at 8:35 AM  Juli Martinez PT, DPT [id number]    Lisa Ashley, M/OT, OTR/L- [id number]

## 2025-03-05 NOTE — DISCHARGE INSTR - COC
she requires {Admit to Appropriate Level of Care:81265} for {GREATER/LESS:389911244} 30 days.     Update Admission H&P: {CHP DME Changes in HandP:505797140}    PHYSICIAN SIGNATURE:  {Esignature:[id number]}

## 2025-03-05 NOTE — PLAN OF CARE
Problem: Chronic Conditions and Co-morbidities  Goal: Patient's chronic conditions and co-morbidity symptoms are monitored and maintained or improved  3/5/2025 0052 by Margoth Pollard RN  Outcome: Progressing  3/4/2025 1624 by Lori Kincaid RN  Outcome: Progressing     Problem: Skin/Tissue Integrity  Goal: Skin integrity remains intact  Description: 1.  Monitor for areas of redness and/or skin breakdown  2.  Assess vascular access sites hourly  3.  Every 4-6 hours minimum:  Change oxygen saturation probe site  4.  Every 4-6 hours:  If on nasal continuous positive airway pressure, respiratory therapy assess nares and determine need for appliance change or resting period  3/5/2025 0052 by Margoth Pollard RN  Outcome: Progressing  3/4/2025 1624 by Lori Kincaid RN  Outcome: Progressing     Problem: Safety - Adult  Goal: Free from fall injury  3/5/2025 0052 by Margoth Pollard RN  Outcome: Progressing  3/4/2025 1624 by Lori Kincaid RN  Outcome: Progressing

## 2025-03-05 NOTE — PROGRESS NOTES
MD Isaac Jeong MD Aldo Estella, DO              Office: [phone]                      Fax: [phone]          NEPHROLOGY  INPATIENT PROGRESS    NOTE:       PATIENT NAME: Kristine Ramirez  : 1975  MRN: [id number]         Subjective / interval history / nephrology update / medical decision making:   Refer to assessment and plan for more details.   Patient was seen comfortably   in bed,   Reported no  active complaints/distress,   Renal labs noted  Seen on HD tolerating it well    ===========================================      IMPRESSION / RECOMMENDATION:      Admitted on:  3/4/2025  For:    Hospital Problems             Last Modified POA    * (Principal) Pneumonia, bacterial 3/4/2025 Yes         1. ESRD on iHD MWF .   - outpt HD center: [facility], with   - Access: Hemodialysis-AV Fistula-Standard, Right Upper Arm, Other/Unknown  Access Placed on 2022      -Outpatient she is noncompliant, does only about 2 times a dialysis with many frequent dialysis  -Encouraged better compliance    - s/p HD Tuesday   + Repeat dialysis on Wednesday to put her back on schedule for better solute and volume control    -After dialysis on Wednesday, okay for discharge from renal standpoint, with outpatient HD  Friday  - Again encourage compliance      2. Hypertension/Volume Status:  - BP uncontrolled hypertension, follow-up with dialysis with more fluid removal and to follow-up with home BP meds:   - Na hyponatremia, mild, follow-up with dialysis  - EDW 64.7, on admission 79 kg  - Fluid overload, chest x-ray with pulmonary vascular congestion  - fluid removal to DW over 3 to 4 L as tolerated    3. Electrolytes/acid-base:  K: Hyperkalemia  Missing dialysis,    Follow-up with dialysis, low K bath  Hold RAAS blockade, keep torsemide on discharge  Follow-up with dialysis    High AG metabolic acidosis: Follow-up with dialysis hide path    4. Anemia of renal  HISTORY:   Past Surgical History:   Procedure Laterality Date    CERVIX SURGERY      laser tx for dysplasia;1992    DIALYSIS FISTULA CREATION Right 2/10/2022    RIGHT BRACHIO CEPHALIC FISTULA CREATION performed by Christiano Lomeli II, MD at [facility] OR    EYE SURGERY      FOOT SURGERY Right     FOOT SURGERY Bilateral     FOOT SURGERY Left     IR TUNNELED CVC PLACE WO SQ PORT/PUMP > 5 YEARS  9/7/2021    IR TUNNELED CATHETER PLACEMENT GREATER THAN 5 YEARS 9/7/2021 Omari Carson MD [facility] SPECIAL PROCEDURES    UPPER GASTROINTESTINAL ENDOSCOPY N/A 9/5/2024    ESOPHAGOGASTRODUODENOSCOPY performed by Joss Bernal MD at [facility] ENDOSCOPY    UPPER GASTROINTESTINAL ENDOSCOPY N/A 12/5/2024    ESOPHAGOGASTRODUODENOSCOPY BIOPSY performed by Kina Montelongo MD at [facility] ENDOSCOPY     FAMILY HISTORY:   Family History   Problem Relation Age of Onset    Diabetes Mother     Other Mother 67        Covid    Diabetes Father     High Blood Pressure Father     Colon Cancer Father     Diabetes Sister     Alcohol Abuse Maternal Grandfather     Diabetes Paternal Grandmother     Alcohol Abuse Paternal Grandfather     Diabetes Paternal Aunt     Diabetes Paternal Uncle      SOCIAL HISTORY:   Social History     Socioeconomic History    Marital status:      Spouse name: Kannan    Number of children: 0    Years of education: None    Highest education level: None   Occupational History    Occupation: works as    Tobacco Use    Smoking status: Every Day     Current packs/day: 1.00     Average packs/day: 1 pack/day for 15.0 years (15.0 ttl pk-yrs)     Types: Cigarettes    Smokeless tobacco: Never    Tobacco comments:     Quit in August 2021- started back up   Vaping Use    Vaping status: Never Used   Substance and Sexual Activity    Alcohol use: No     Comment: Very Rare    Drug use: No    Sexual activity: Yes     Partners: Male     Social Determinants of Health     Financial Resource Strain: High Risk (8/22/2024)    Overall

## 2025-03-05 NOTE — PROGRESS NOTES
[facility] - Inpatient Rehabilitation Department   Phone: [phone]    Occupational Therapy    [x] Initial Evaluation            [] Daily Treatment Note         [] Discharge Summary      Patient: Kristine Ramirez   : 1975   MRN: [id number]   Date of Service:  3/5/2025    Admitting Diagnosis:  Pneumonia, bacterial  Current Admission Summary: Kristine Ramirez is a 49 y.o. female with PMHx of ESRD on HD, T2DM, Tobacco abuse, HTN who was admitted with pulmonary edema.  Her pulmonary edema was found to be due to the fact that she missed a couple sessions of dialysis.  During admission, she underwent 2 sessions of dialysis.  Patient was discharged in stable fashion.  She had an elevated procalcitonin on admission and was started on antibiotics for any possible PNA.  Patient was discharged follow-up with PCP and nephrology in the outpatient setting.   Past Medical History:  has a past medical history of Acute respiratory failure due to COVID-19 ([facility]), Arterial ischemic stroke, ICA, left, acute ([facility]), Blind in both eyes, Cerebral artery occlusion with cerebral infarction ([facility]), CHF (congestive heart failure) ([facility]), Chronic kidney disease, COPD (chronic obstructive pulmonary disease) ([facility]), Depression, Diabetes mellitus out of control, Diabetes mellitus, type II ([facility]), Diabetic neuropathy associated with type 2 diabetes mellitus (HCC), Generalized headaches, Hypertension, Infertility, Insomnia, Migraine headache, Mixed hyperlipidemia, Otitis media, Pelvic abscess in female, Pneumonia, Stroke ([facility]), and Stroke ([facility]).  Past Surgical History:  has a past surgical history that includes Cervix surgery; eye surgery; Foot surgery (Right); Foot surgery (Bilateral); Foot surgery (Left); IR TUNNELED CVC PLACE WO SQ PORT/PUMP > 5 YEARS (2021); Dialysis fistula creation (Right, 2/10/2022); Upper gastrointestinal endoscopy (N/A, 2024); and Upper gastrointestinal endoscopy (N/A,

## 2025-03-06 ENCOUNTER — TELEPHONE (OUTPATIENT)
Dept: FAMILY MEDICINE CLINIC | Age: 50
End: 2025-03-06

## 2025-03-06 NOTE — TELEPHONE ENCOUNTER
Care Transitions Initial Follow Up Call    Outreach made within 2 business days of discharge: Yes    Patient: Kristine Ramirez Patient : 1975   MRN: [id number]  Reason for Admission: 3/4/25  Discharge Date: 3/5/25       Spoke with: pt     Discharge department/facility: [facility] Interactive Patient Contact:  Was patient able to fill all prescriptions: Yes  Was patient instructed to bring all medications to the follow-up visit: Yes  Is patient taking all medications as directed in the discharge summary? Yes  Does patient understand their discharge instructions: Yes  Does patient have questions or concerns that need addressed prior to 7-14 day follow up office visit: no    Pt states that's she is now using transport and will call if she needs anything additional.     Follow Up  Future Appointments   Date Time Provider Department Center   4/3/2025 10:30 AM Damon Mireles MD [facility] [place]

## 2025-03-07 NOTE — PROGRESS NOTES
Physician Progress Note      PATIENT:               INCKY POOLE  CSN #:                  [id number]  :                       1975  ADMIT DATE:       3/4/2025 12:14 AM  DISCH DATE:        3/5/2025 5:22 PM  RESPONDING  PROVIDER #:        ALONZO SARAH MD          QUERY TEXT:    Patient admitted with weakness after missing dialysis.  Noted documentation of   acute respiratory failure in notes. ED pn 3/4--\" Pulmonary:    Effort:   Pulmonary effort is normal. No respiratory distress.    Breath sounds: Rhonchi   present.\". In order to support the diagnosis of acute respiratory failure,   please include additional clinical indicators in your documentation.  Or   please document if the diagnosis of acute respiratory failure has been ruled   out after further study.    The medical record reflects the following:  Risk Factors: COPD, ESRD, DM2, Possible PNA, HTN  Clinical Indicators: resps 18-21, VBG- ph-7.4, pc02- 38.5, ra sats   80%---4lnc--98%  ED pn 3/4--\" Pulmonary:    Effort: Pulmonary effort is normal. No respiratory   distress.    Breath sounds: Rhonchi present. She was 80% on room air on   arrival with no baseline oxygen requirement.\"  H&P--\" -        Acute on chronic respiratory failure with hypoxia--          Continue supplemental oxygen via nasal cannula and wean oxygen as   tolerated.--Respiratory:  Normal respiratory effort.  Diminished breath sounds   bilaterally clear to auscultation, bilaterally without   Rales/Wheezes/Rhonchi..\"  Treatment: IV azith/roceph, nephrology consult, duonebs, 4lnc oxygen, cxray,   CT, essential home meds, supportive care    Thank you,  Alec Cardenas RN,BSB  Options provided:  -- Acute Respiratory Failure as evidenced by, Please document evidence.  -- Acute on chronic  Respiratory Failure ruled out after study  -- Other - I will add my own diagnosis  -- Disagree - Not applicable / Not valid  -- Disagree - Clinically unable to determine / Unknown  -- Refer to Clinical

## 2025-03-08 LAB
BACTERIA BLD CULT ORG #2: NORMAL
BACTERIA BLD CULT: NORMAL

## 2025-03-17 ENCOUNTER — APPOINTMENT (OUTPATIENT)
Dept: CT IMAGING | Age: 50
End: 2025-03-17
Payer: COMMERCIAL

## 2025-03-17 ENCOUNTER — APPOINTMENT (OUTPATIENT)
Age: 50
End: 2025-03-17
Payer: COMMERCIAL

## 2025-03-17 ENCOUNTER — HOSPITAL ENCOUNTER (INPATIENT)
Age: 50
LOS: 4 days | Discharge: HOME | End: 2025-03-21
Attending: STUDENT IN AN ORGANIZED HEALTH CARE EDUCATION/TRAINING PROGRAM | Admitting: HOSPITALIST
Payer: COMMERCIAL

## 2025-03-17 ENCOUNTER — APPOINTMENT (OUTPATIENT)
Dept: GENERAL RADIOLOGY | Age: 50
End: 2025-03-17
Payer: COMMERCIAL

## 2025-03-17 DIAGNOSIS — I31.39 PERICARDIAL EFFUSION: ICD-10-CM

## 2025-03-17 DIAGNOSIS — J96.02 ACUTE RESPIRATORY FAILURE WITH HYPOXIA AND HYPERCAPNIA: ICD-10-CM

## 2025-03-17 DIAGNOSIS — R29.6 MULTIPLE FALLS: ICD-10-CM

## 2025-03-17 DIAGNOSIS — J96.01 ACUTE RESPIRATORY FAILURE WITH HYPOXIA AND HYPERCAPNIA: ICD-10-CM

## 2025-03-17 DIAGNOSIS — I42.9 CARDIOMYOPATHY, UNSPECIFIED TYPE (HCC): ICD-10-CM

## 2025-03-17 DIAGNOSIS — I30.9 ACUTE PERICARDITIS, UNSPECIFIED TYPE: ICD-10-CM

## 2025-03-17 DIAGNOSIS — N18.6 ESRD (END STAGE RENAL DISEASE) ON DIALYSIS (HCC): ICD-10-CM

## 2025-03-17 DIAGNOSIS — R53.1 GENERALIZED WEAKNESS: Primary | ICD-10-CM

## 2025-03-17 DIAGNOSIS — Z99.2 ESRD (END STAGE RENAL DISEASE) ON DIALYSIS (HCC): ICD-10-CM

## 2025-03-17 LAB
ALBUMIN SERPL-MCNC: 4 G/DL (ref 3.4–5)
ALBUMIN SERPL-MCNC: 4.3 G/DL (ref 3.4–5)
ALBUMIN/GLOB SERPL: 1.2 {RATIO} (ref 1.1–2.2)
ALP SERPL-CCNC: 326 U/L (ref 40–129)
ALT SERPL-CCNC: <5 U/L (ref 10–40)
ANION GAP SERPL CALCULATED.3IONS-SCNC: 21 MMOL/L (ref 3–16)
ANION GAP SERPL CALCULATED.3IONS-SCNC: 21 MMOL/L (ref 3–16)
AST SERPL-CCNC: 18 U/L (ref 15–37)
BASE EXCESS BLDV CALC-SCNC: 2.8 MMOL/L (ref -3–3)
BASOPHILS # BLD: 0.1 K/UL (ref 0–0.2)
BASOPHILS NFR BLD: 0.7 %
BILIRUB SERPL-MCNC: 1 MG/DL (ref 0–1)
BUN SERPL-MCNC: 59 MG/DL (ref 7–20)
BUN SERPL-MCNC: 61 MG/DL (ref 7–20)
CALCIUM SERPL-MCNC: 7.8 MG/DL (ref 8.3–10.6)
CALCIUM SERPL-MCNC: 8.1 MG/DL (ref 8.3–10.6)
CHLORIDE SERPL-SCNC: 86 MMOL/L (ref 99–110)
CHLORIDE SERPL-SCNC: 87 MMOL/L (ref 99–110)
CK SERPL-CCNC: 81 U/L (ref 26–192)
CO2 BLDV-SCNC: 70 MMOL/L
CO2 SERPL-SCNC: 22 MMOL/L (ref 21–32)
CO2 SERPL-SCNC: 25 MMOL/L (ref 21–32)
COHGB MFR BLDV: 4.4 % (ref 0–1.5)
CREAT SERPL-MCNC: 6.7 MG/DL (ref 0.6–1.1)
CREAT SERPL-MCNC: 7 MG/DL (ref 0.6–1.1)
D-DIMER QUANTITATIVE: 7.88 UG/ML FEU (ref 0–0.6)
DEPRECATED RDW RBC AUTO: 19.9 % (ref 12.4–15.4)
DO-HGB MFR BLDV: 29 %
ECHO AO ROOT DIAM: 2.5 CM
ECHO AO ROOT INDEX: 1.38 CM/M2
ECHO AV AREA PEAK VELOCITY: 2.4 CM2
ECHO AV AREA VTI: 2.8 CM2
ECHO AV AREA/BSA PEAK VELOCITY: 1.3 CM2/M2
ECHO AV AREA/BSA VTI: 1.5 CM2/M2
ECHO AV MEAN GRADIENT: 3 MMHG
ECHO AV MEAN VELOCITY: 0.8 M/S
ECHO AV PEAK GRADIENT: 5 MMHG
ECHO AV PEAK VELOCITY: 1.1 M/S
ECHO AV VELOCITY RATIO: 1
ECHO AV VTI: 12.8 CM
ECHO BSA: 1.82 M2
ECHO EST RA PRESSURE: 3 MMHG
ECHO LA AREA 2C: 18.5 CM2
ECHO LA AREA 4C: 19.3 CM2
ECHO LA MAJOR AXIS: 6 CM
ECHO LA MINOR AXIS: 5.5 CM
ECHO LA VOL BP: 53 ML (ref 22–52)
ECHO LA VOL MOD A2C: 50 ML (ref 22–52)
ECHO LA VOL MOD A4C: 52 ML (ref 22–52)
ECHO LA VOL/BSA BIPLANE: 29 ML/M2 (ref 16–34)
ECHO LA VOLUME INDEX MOD A2C: 28 ML/M2 (ref 16–34)
ECHO LA VOLUME INDEX MOD A4C: 29 ML/M2 (ref 16–34)
ECHO LV E' LATERAL VELOCITY: 4.68 CM/S
ECHO LV E' SEPTAL VELOCITY: 5.87 CM/S
ECHO LV EDV A2C: 126 ML
ECHO LV EDV A4C: 112 ML
ECHO LV EDV INDEX A4C: 62 ML/M2
ECHO LV EDV NDEX A2C: 70 ML/M2
ECHO LV EF PHYSICIAN: 50 %
ECHO LV EJECTION FRACTION A2C: 51 %
ECHO LV EJECTION FRACTION A4C: 54 %
ECHO LV EJECTION FRACTION BIPLANE: 52 % (ref 55–100)
ECHO LV ESV A2C: 62 ML
ECHO LV ESV A4C: 52 ML
ECHO LV ESV INDEX A2C: 34 ML/M2
ECHO LV ESV INDEX A4C: 29 ML/M2
ECHO LV FRACTIONAL SHORTENING: 24 % (ref 28–44)
ECHO LV INTERNAL DIMENSION DIASTOLE INDEX: 2.32 CM/M2
ECHO LV INTERNAL DIMENSION DIASTOLIC: 4.2 CM (ref 3.9–5.3)
ECHO LV INTERNAL DIMENSION SYSTOLIC INDEX: 1.77 CM/M2
ECHO LV INTERNAL DIMENSION SYSTOLIC: 3.2 CM
ECHO LV IVSD: 1.1 CM (ref 0.6–0.9)
ECHO LV MASS 2D: 167.4 G (ref 67–162)
ECHO LV MASS INDEX 2D: 92.5 G/M2 (ref 43–95)
ECHO LV POSTERIOR WALL DIASTOLIC: 1.2 CM (ref 0.6–0.9)
ECHO LV RELATIVE WALL THICKNESS RATIO: 0.57
ECHO LVOT AREA: 2.5 CM2
ECHO LVOT AV VTI INDEX: 1.11
ECHO LVOT DIAM: 1.8 CM
ECHO LVOT MEAN GRADIENT: 2 MMHG
ECHO LVOT PEAK GRADIENT: 4 MMHG
ECHO LVOT PEAK VELOCITY: 1.1 M/S
ECHO LVOT STROKE VOLUME INDEX: 20 ML/M2
ECHO LVOT SV: 36.1 ML
ECHO LVOT VTI: 14.2 CM
ECHO PV MAX VELOCITY: 1.2 M/S
ECHO PV PEAK GRADIENT: 6 MMHG
ECHO RA AREA 4C: 17.3 CM2
ECHO RA END SYSTOLIC VOLUME APICAL 4 CHAMBER INDEX BSA: 25 ML/M2
ECHO RA VOLUME: 46 ML
ECHO RV BASAL DIMENSION: 3.9 CM
ECHO RV FREE WALL PEAK S': 8.9 CM/S
ECHO RV LONGITUDINAL DIMENSION: 8.1 CM
ECHO RV MID DIMENSION: 2.8 CM
ECHO RV TAPSE: 1 CM (ref 1.7–?)
EKG ATRIAL RATE: 114 BPM
EKG DIAGNOSIS: NORMAL
EKG P AXIS: 56 DEGREES
EKG P-R INTERVAL: 130 MS
EKG Q-T INTERVAL: 318 MS
EKG QRS DURATION: 76 MS
EKG QTC CALCULATION (BAZETT): 438 MS
EKG R AXIS: 48 DEGREES
EKG T AXIS: 130 DEGREES
EKG VENTRICULAR RATE: 114 BPM
EOSINOPHIL # BLD: 0.1 K/UL (ref 0–0.6)
EOSINOPHIL NFR BLD: 0.4 %
FLUAV RNA RESP QL NAA+PROBE: NOT DETECTED
FLUBV RNA RESP QL NAA+PROBE: NOT DETECTED
GFR SERPLBLD CREATININE-BSD FMLA CKD-EPI: 7 ML/MIN/{1.73_M2}
GFR SERPLBLD CREATININE-BSD FMLA CKD-EPI: 7 ML/MIN/{1.73_M2}
GLUCOSE SERPL-MCNC: 198 MG/DL (ref 70–99)
GLUCOSE SERPL-MCNC: 222 MG/DL (ref 70–99)
HCO3 BLDV-SCNC: 29.5 MMOL/L (ref 23–29)
HCT VFR BLD AUTO: 38.4 % (ref 36–48)
HGB BLD-MCNC: 12.6 G/DL (ref 12–16)
LYMPHOCYTES # BLD: 0.5 K/UL (ref 1–5.1)
LYMPHOCYTES NFR BLD: 3.4 %
MCH RBC QN AUTO: 29.4 PG (ref 26–34)
MCHC RBC AUTO-ENTMCNC: 32.8 G/DL (ref 31–36)
MCV RBC AUTO: 89.8 FL (ref 80–100)
METHGB MFR BLDV: 0.7 %
MONOCYTES # BLD: 1 K/UL (ref 0–1.3)
MONOCYTES NFR BLD: 6.8 %
NEUTROPHILS # BLD: 13.6 K/UL (ref 1.7–7.7)
NEUTROPHILS NFR BLD: 88.7 %
NT-PROBNP SERPL-MCNC: ABNORMAL PG/ML (ref 0–124)
O2 CT VFR BLDV CALC: 12 VOL %
O2 THERAPY: ABNORMAL
PCO2 BLDV: 53.8 MMHG (ref 40–50)
PH BLDV: 7.35 [PH] (ref 7.35–7.45)
PHOSPHATE SERPL-MCNC: 7.8 MG/DL (ref 2.5–4.9)
PLATELET # BLD AUTO: 191 K/UL (ref 135–450)
PMV BLD AUTO: 8 FL (ref 5–10.5)
PO2 BLDV: 41.9 MMHG (ref 25–40)
POTASSIUM SERPL-SCNC: 4.6 MMOL/L (ref 3.5–5.1)
POTASSIUM SERPL-SCNC: 4.9 MMOL/L (ref 3.5–5.1)
PROT SERPL-MCNC: 7.8 G/DL (ref 6.4–8.2)
RBC # BLD AUTO: 4.28 M/UL (ref 4–5.2)
SAO2 % BLDV: 70 %
SARS-COV-2 RNA RESP QL NAA+PROBE: NOT DETECTED
SODIUM SERPL-SCNC: 130 MMOL/L (ref 136–145)
SODIUM SERPL-SCNC: 132 MMOL/L (ref 136–145)
TROPONIN, HIGH SENSITIVITY: 384 NG/L (ref 0–14)
TROPONIN, HIGH SENSITIVITY: 420 NG/L (ref 0–14)
WBC # BLD AUTO: 15.3 K/UL (ref 4–11)

## 2025-03-17 PROCEDURE — 71260 CT THORAX DX C+: CPT

## 2025-03-17 PROCEDURE — 6370000000 HC RX 637 (ALT 250 FOR IP): Performed by: NURSE PRACTITIONER

## 2025-03-17 PROCEDURE — 93010 ELECTROCARDIOGRAM REPORT: CPT | Performed by: INTERNAL MEDICINE

## 2025-03-17 PROCEDURE — 94761 N-INVAS EAR/PLS OXIMETRY MLT: CPT

## 2025-03-17 PROCEDURE — C8929 TTE W OR WO FOL WCON,DOPPLER: HCPCS

## 2025-03-17 PROCEDURE — 93005 ELECTROCARDIOGRAM TRACING: CPT | Performed by: NURSE PRACTITIONER

## 2025-03-17 PROCEDURE — 71045 X-RAY EXAM CHEST 1 VIEW: CPT

## 2025-03-17 PROCEDURE — 83880 ASSAY OF NATRIURETIC PEPTIDE: CPT

## 2025-03-17 PROCEDURE — 74176 CT ABD & PELVIS W/O CONTRAST: CPT

## 2025-03-17 PROCEDURE — 85025 COMPLETE CBC W/AUTO DIFF WBC: CPT

## 2025-03-17 PROCEDURE — 82550 ASSAY OF CK (CPK): CPT

## 2025-03-17 PROCEDURE — 85379 FIBRIN DEGRADATION QUANT: CPT

## 2025-03-17 PROCEDURE — 94640 AIRWAY INHALATION TREATMENT: CPT

## 2025-03-17 PROCEDURE — 6360000004 HC RX CONTRAST MEDICATION: Performed by: NURSE PRACTITIONER

## 2025-03-17 PROCEDURE — 70450 CT HEAD/BRAIN W/O DYE: CPT

## 2025-03-17 PROCEDURE — 6370000000 HC RX 637 (ALT 250 FOR IP): Performed by: HOSPITALIST

## 2025-03-17 PROCEDURE — 93306 TTE W/DOPPLER COMPLETE: CPT | Performed by: INTERNAL MEDICINE

## 2025-03-17 PROCEDURE — 99285 EMERGENCY DEPT VISIT HI MDM: CPT

## 2025-03-17 PROCEDURE — 2700000000 HC OXYGEN THERAPY PER DAY

## 2025-03-17 PROCEDURE — 6360000004 HC RX CONTRAST MEDICATION: Performed by: INTERNAL MEDICINE

## 2025-03-17 PROCEDURE — 6360000002 HC RX W HCPCS: Performed by: NURSE PRACTITIONER

## 2025-03-17 PROCEDURE — 99223 1ST HOSP IP/OBS HIGH 75: CPT | Performed by: INTERNAL MEDICINE

## 2025-03-17 PROCEDURE — 36415 COLL VENOUS BLD VENIPUNCTURE: CPT

## 2025-03-17 PROCEDURE — 90935 HEMODIALYSIS ONE EVALUATION: CPT

## 2025-03-17 PROCEDURE — 72125 CT NECK SPINE W/O DYE: CPT

## 2025-03-17 PROCEDURE — 84484 ASSAY OF TROPONIN QUANT: CPT

## 2025-03-17 PROCEDURE — 1200000000 HC SEMI PRIVATE

## 2025-03-17 PROCEDURE — 2500000003 HC RX 250 WO HCPCS: Performed by: NURSE PRACTITIONER

## 2025-03-17 PROCEDURE — 80053 COMPREHEN METABOLIC PANEL: CPT

## 2025-03-17 PROCEDURE — 87636 SARSCOV2 & INF A&B AMP PRB: CPT

## 2025-03-17 PROCEDURE — 82803 BLOOD GASES ANY COMBINATION: CPT

## 2025-03-17 RX ORDER — AMLODIPINE BESYLATE 5 MG/1
5 TABLET ORAL DAILY
Status: DISCONTINUED | OUTPATIENT
Start: 2025-03-17 | End: 2025-03-21 | Stop reason: HOSPADM

## 2025-03-17 RX ORDER — SEVELAMER CARBONATE 800 MG/1
2400 TABLET, FILM COATED ORAL
Status: DISCONTINUED | OUTPATIENT
Start: 2025-03-17 | End: 2025-03-21 | Stop reason: HOSPADM

## 2025-03-17 RX ORDER — SENNA AND DOCUSATE SODIUM 50; 8.6 MG/1; MG/1
2 TABLET, FILM COATED ORAL 2 TIMES DAILY
Status: DISCONTINUED | OUTPATIENT
Start: 2025-03-17 | End: 2025-03-21 | Stop reason: HOSPADM

## 2025-03-17 RX ORDER — HEPARIN SODIUM 5000 [USP'U]/ML
5000 INJECTION, SOLUTION INTRAVENOUS; SUBCUTANEOUS EVERY 8 HOURS SCHEDULED
Status: DISCONTINUED | OUTPATIENT
Start: 2025-03-17 | End: 2025-03-21 | Stop reason: HOSPADM

## 2025-03-17 RX ORDER — ACETAMINOPHEN 325 MG/1
650 TABLET ORAL EVERY 6 HOURS PRN
Status: DISCONTINUED | OUTPATIENT
Start: 2025-03-17 | End: 2025-03-21 | Stop reason: HOSPADM

## 2025-03-17 RX ORDER — ENOXAPARIN SODIUM 100 MG/ML
40 INJECTION SUBCUTANEOUS DAILY
Status: CANCELLED | OUTPATIENT
Start: 2025-03-17

## 2025-03-17 RX ORDER — ALBUTEROL SULFATE 0.83 MG/ML
2.5 SOLUTION RESPIRATORY (INHALATION) EVERY 6 HOURS PRN
Status: DISCONTINUED | OUTPATIENT
Start: 2025-03-17 | End: 2025-03-21 | Stop reason: HOSPADM

## 2025-03-17 RX ORDER — ASPIRIN 81 MG/1
81 TABLET ORAL DAILY
Status: DISCONTINUED | OUTPATIENT
Start: 2025-03-17 | End: 2025-03-21 | Stop reason: HOSPADM

## 2025-03-17 RX ORDER — ALPRAZOLAM 0.5 MG
0.5 TABLET ORAL EVERY 6 HOURS PRN
Status: DISCONTINUED | OUTPATIENT
Start: 2025-03-17 | End: 2025-03-21 | Stop reason: HOSPADM

## 2025-03-17 RX ORDER — POTASSIUM CHLORIDE 1500 MG/1
40 TABLET, EXTENDED RELEASE ORAL PRN
Status: DISCONTINUED | OUTPATIENT
Start: 2025-03-17 | End: 2025-03-17

## 2025-03-17 RX ORDER — SODIUM CHLORIDE 9 MG/ML
INJECTION, SOLUTION INTRAVENOUS PRN
Status: DISCONTINUED | OUTPATIENT
Start: 2025-03-17 | End: 2025-03-21 | Stop reason: HOSPADM

## 2025-03-17 RX ORDER — PANTOPRAZOLE SODIUM 40 MG/1
40 TABLET, DELAYED RELEASE ORAL
Status: DISCONTINUED | OUTPATIENT
Start: 2025-03-17 | End: 2025-03-21 | Stop reason: HOSPADM

## 2025-03-17 RX ORDER — POTASSIUM CHLORIDE 7.45 MG/ML
10 INJECTION INTRAVENOUS PRN
Status: DISCONTINUED | OUTPATIENT
Start: 2025-03-17 | End: 2025-03-17

## 2025-03-17 RX ORDER — CLONIDINE HYDROCHLORIDE 0.1 MG/1
0.2 TABLET ORAL 3 TIMES DAILY
Status: DISCONTINUED | OUTPATIENT
Start: 2025-03-17 | End: 2025-03-21 | Stop reason: HOSPADM

## 2025-03-17 RX ORDER — ONDANSETRON 4 MG/1
4 TABLET, ORALLY DISINTEGRATING ORAL EVERY 8 HOURS PRN
Status: DISCONTINUED | OUTPATIENT
Start: 2025-03-17 | End: 2025-03-21 | Stop reason: HOSPADM

## 2025-03-17 RX ORDER — ALBUTEROL SULFATE 90 UG/1
2 INHALANT RESPIRATORY (INHALATION) EVERY 6 HOURS PRN
Status: DISCONTINUED | OUTPATIENT
Start: 2025-03-17 | End: 2025-03-21 | Stop reason: HOSPADM

## 2025-03-17 RX ORDER — SODIUM CHLORIDE 0.9 % (FLUSH) 0.9 %
5-40 SYRINGE (ML) INJECTION EVERY 12 HOURS SCHEDULED
Status: DISCONTINUED | OUTPATIENT
Start: 2025-03-17 | End: 2025-03-21 | Stop reason: HOSPADM

## 2025-03-17 RX ORDER — SODIUM CHLORIDE 0.9 % (FLUSH) 0.9 %
5-40 SYRINGE (ML) INJECTION PRN
Status: DISCONTINUED | OUTPATIENT
Start: 2025-03-17 | End: 2025-03-21 | Stop reason: HOSPADM

## 2025-03-17 RX ORDER — MAGNESIUM SULFATE IN WATER 40 MG/ML
2000 INJECTION, SOLUTION INTRAVENOUS PRN
Status: DISCONTINUED | OUTPATIENT
Start: 2025-03-17 | End: 2025-03-17

## 2025-03-17 RX ORDER — IOPAMIDOL 755 MG/ML
75 INJECTION, SOLUTION INTRAVASCULAR
Status: COMPLETED | OUTPATIENT
Start: 2025-03-17 | End: 2025-03-17

## 2025-03-17 RX ORDER — ACETAMINOPHEN 650 MG/1
650 SUPPOSITORY RECTAL EVERY 6 HOURS PRN
Status: DISCONTINUED | OUTPATIENT
Start: 2025-03-17 | End: 2025-03-21 | Stop reason: HOSPADM

## 2025-03-17 RX ORDER — POLYETHYLENE GLYCOL 3350 17 G/17G
17 POWDER, FOR SOLUTION ORAL DAILY PRN
Status: DISCONTINUED | OUTPATIENT
Start: 2025-03-17 | End: 2025-03-21 | Stop reason: HOSPADM

## 2025-03-17 RX ORDER — BISACODYL 10 MG
10 SUPPOSITORY, RECTAL RECTAL DAILY PRN
Status: DISCONTINUED | OUTPATIENT
Start: 2025-03-17 | End: 2025-03-21 | Stop reason: HOSPADM

## 2025-03-17 RX ORDER — METOPROLOL TARTRATE 25 MG/1
25 TABLET, FILM COATED ORAL 2 TIMES DAILY
Status: DISCONTINUED | OUTPATIENT
Start: 2025-03-17 | End: 2025-03-21 | Stop reason: HOSPADM

## 2025-03-17 RX ORDER — HYDROCODONE BITARTRATE AND ACETAMINOPHEN 5; 325 MG/1; MG/1
1 TABLET ORAL
Status: COMPLETED | OUTPATIENT
Start: 2025-03-17 | End: 2025-03-17

## 2025-03-17 RX ORDER — ONDANSETRON 2 MG/ML
4 INJECTION INTRAMUSCULAR; INTRAVENOUS EVERY 6 HOURS PRN
Status: DISCONTINUED | OUTPATIENT
Start: 2025-03-17 | End: 2025-03-21 | Stop reason: HOSPADM

## 2025-03-17 RX ADMIN — SODIUM CHLORIDE, PRESERVATIVE FREE 10 ML: 5 INJECTION INTRAVENOUS at 21:53

## 2025-03-17 RX ADMIN — SENNOSIDES, DOCUSATE SODIUM 2 TABLET: 50; 8.6 TABLET, FILM COATED ORAL at 21:52

## 2025-03-17 RX ADMIN — SULFUR HEXAFLUORIDE 2 ML: 60.7; .19; .19 INJECTION, POWDER, LYOPHILIZED, FOR SUSPENSION INTRAVENOUS; INTRAVESICAL at 16:34

## 2025-03-17 RX ADMIN — ALPRAZOLAM 0.5 MG: 0.5 TABLET ORAL at 22:03

## 2025-03-17 RX ADMIN — SEVELAMER CARBONATE 2400 MG: 800 TABLET, FILM COATED ORAL at 17:18

## 2025-03-17 RX ADMIN — HYDROCODONE BITARTRATE AND ACETAMINOPHEN 1 TABLET: 5; 325 TABLET ORAL at 03:07

## 2025-03-17 RX ADMIN — PANTOPRAZOLE SODIUM 40 MG: 40 TABLET, DELAYED RELEASE ORAL at 06:11

## 2025-03-17 RX ADMIN — METOPROLOL TARTRATE 25 MG: 25 TABLET, FILM COATED ORAL at 21:53

## 2025-03-17 RX ADMIN — CLONIDINE HYDROCHLORIDE 0.2 MG: 0.1 TABLET ORAL at 21:53

## 2025-03-17 RX ADMIN — HEPARIN SODIUM 5000 UNITS: 5000 INJECTION INTRAVENOUS; SUBCUTANEOUS at 21:53

## 2025-03-17 RX ADMIN — HEPARIN SODIUM 5000 UNITS: 5000 INJECTION INTRAVENOUS; SUBCUTANEOUS at 14:06

## 2025-03-17 RX ADMIN — PANTOPRAZOLE SODIUM 40 MG: 40 TABLET, DELAYED RELEASE ORAL at 17:18

## 2025-03-17 RX ADMIN — TIOTROPIUM BROMIDE AND OLODATEROL 2 PUFF: 3.124; 2.736 SPRAY, METERED RESPIRATORY (INHALATION) at 08:46

## 2025-03-17 RX ADMIN — ALPRAZOLAM 0.5 MG: 0.5 TABLET ORAL at 06:11

## 2025-03-17 RX ADMIN — IOPAMIDOL 75 ML: 755 INJECTION, SOLUTION INTRAVENOUS at 14:36

## 2025-03-17 RX ADMIN — CLONIDINE HYDROCHLORIDE 0.2 MG: 0.1 TABLET ORAL at 14:06

## 2025-03-17 RX ADMIN — HEPARIN SODIUM 5000 UNITS: 5000 INJECTION INTRAVENOUS; SUBCUTANEOUS at 06:11

## 2025-03-17 ASSESSMENT — PAIN DESCRIPTION - LOCATION
LOCATION: GENERALIZED;BACK;HEAD
LOCATION: HEAD;BACK
LOCATION: BACK;GENERALIZED

## 2025-03-17 ASSESSMENT — PAIN SCALES - GENERAL
PAINLEVEL_OUTOF10: 3
PAINLEVEL_OUTOF10: 3
PAINLEVEL_OUTOF10: 8
PAINLEVEL_OUTOF10: 0

## 2025-03-17 ASSESSMENT — PAIN - FUNCTIONAL ASSESSMENT: PAIN_FUNCTIONAL_ASSESSMENT: 0-10

## 2025-03-17 ASSESSMENT — ENCOUNTER SYMPTOMS
SHORTNESS OF BREATH: 1
DIARRHEA: 0
ABDOMINAL PAIN: 0
NAUSEA: 0
CHEST TIGHTNESS: 0
VOMITING: 0
BACK PAIN: 1

## 2025-03-17 NOTE — H&P
opacities favoring subsegmental atelectasis. 6. Small volume abdominopelvic ascites and body wall edema. 7. Cholelithiasis. 8. Large stool burden predominates in the proximal colon.     CT THORACIC SPINE BONY RECONSTRUCTION  Result Date: 3/17/2025  EXAMINATION: CT OF THE CHEST, ABDOMEN, AND PELVIS WITHOUT CONTRAST; CT OF THE THORACIC SPINE WITHOUT CONTRAST; CT OF THE LUMBAR SPINE WITHOUT CONTRAST 3/17/2025 1:10 am; 3/17/2025 1:12 am TECHNIQUE: CT of the chest, abdomen and pelvis was performed without the administration of intravenous contrast. Multiplanar reformatted images are provided for review. Automated exposure control, iterative reconstruction, and/or weight based adjustment of the mA/kV was utilized to reduce the radiation dose to as low as reasonably achievable.; CT of the thoracic spine was performed without the administration of intravenous contrast. Multiplanar reformatted images are provided for review. Automated exposure control, iterative reconstruction, and/or weight based adjustment of the mA/kV was utilized to reduce the radiation dose to as low as reasonably achievable.; CT of the lumbar spine was performed without the administration of intravenous contrast. Multiplanar reformatted images are provided for review.  Adjustment of mA and/or kV according to patient size was utilized.  Automated exposure control, iterative reconstruction, and/or weight based adjustment of the mA/kV was utilized to reduce the radiation dose to as low as reasonably achievable. COMPARISON: CT abdomen and pelvis 08/29/2024 HISTORY: ORDERING SYSTEM PROVIDED HISTORY: fall, unable to get up, left hip pain, left shoulder/chest pain TECHNOLOGIST PROVIDED HISTORY: Reason for exam:->fall, unable to get up, left hip pain, left shoulder/chest pain Additional Contrast?->None Decision Support Exception - unselect if not a suspected or confirmed emergency medical condition->Emergency Medical Condition (MA); ORDERING SYSTEM PROVIDED  fall, head injury TECHNOLOGIST PROVIDED HISTORY: Reason for exam:->fall, head injury Decision Support Exception - unselect if not a suspected or confirmed emergency medical condition->Emergency Medical Condition (MA) FINDINGS: HEAD: BRAIN/VENTRICLES: There is no acute intracranial hemorrhage, mass effect or midline shift.  No abnormal extra-axial fluid collection. Cortical atrophy and chronic white matter changes in the brain and associated ventricular enlargement are again demonstrated. ORBITS: The visualized portion of the orbits demonstrate no acute abnormality. SINUSES: The visualized paranasal sinuses and mastoid air cells demonstrate no acute abnormality. SOFT TISSUES/SKULL: No acute abnormality of the visualized skull or soft tissues. CERVICAL SPINE: BONES/ALIGNMENT: There is no evidence of an acute cervical spine fracture. No traumatic malalignment. DEGENERATIVE CHANGES: Advanced multilevel degenerative disc disease and facet arthropathy. SOFT TISSUES: There is no prevertebral soft tissue swelling.  Mild subcutaneous edema in the upper thoracic and lower cervical region.     Chronic findings in the brain without acute CT abnormality identified. No acute osseous abnormality identified in the cervical spine.     CT CERVICAL SPINE WO CONTRAST  Result Date: 3/17/2025  EXAMINATION: CT OF THE HEAD WITHOUT CONTRAST; CT OF THE CERVICAL SPINE WITHOUT CONTRAST 3/17/2025 1:10 am TECHNIQUE: CT of the head was performed without the administration of intravenous contrast. Automated exposure control, iterative reconstruction, and/or weight based adjustment of the mA/kV was utilized to reduce the radiation dose to as low as reasonably achievable.; CT of the cervical spine was performed without the administration of intravenous contrast. Multiplanar reformatted images are provided for review. Automated exposure control, iterative reconstruction, and/or weight based adjustment of the mA/kV was utilized to reduce the radiation

## 2025-03-17 NOTE — PROGRESS NOTES
Patient admitted from home, pt fell at home, VSS, pt found hypoxic , currently on 2L NC, alert and oriented, pt is totally blind, head to toe assessment done, medications given per MAR, pt is awake, in bed, call light within reach, safety precautions in place.

## 2025-03-17 NOTE — PROGRESS NOTES
Elevated ddimer. Obtain Ctpa to evaluate for pulmonary embolism given chest pain, tachycardia, elevated troponin.

## 2025-03-17 NOTE — PROGRESS NOTES
4 Eyes Skin Assessment     NAME:  Kristine Ramirez  YOB: 1975  MEDICAL RECORD NUMBER:  [id number]    The patient is being assessed for  Admission    I agree that at least one RN has performed a thorough Head to Toe Skin Assessment on the patient. ALL assessment sites listed below have been assessed.      Areas assessed by both nurses:    Head, Face, Ears, Shoulders, Back, Chest, Arms, Elbows, Hands, Sacrum. Buttock, Coccyx, Ischium, Legs. Feet and Heels, and Under Medical Devices         Does the Patient have a Wound? No noted wound(s)       Robbie Prevention initiated by RN: No  Wound Care Orders initiated by RN: No    Pressure Injury (Stage 3,4, Unstageable, DTI, NWPT, and Complex wounds) if present, place Wound referral order by RN under : No    New Ostomies, if present place, Ostomy referral order under : No     Nurse 1 eSignature: Electronically signed by Emy Manzo RN on 3/17/25 at 6:28 AM EDT    **SHARE this note so that the co-signing nurse can place an eSignature**    Nurse 2 eSignature: Electronically signed by EMANUEL YANEZ RN on 3/17/25 at 7:43 AM EDT

## 2025-03-17 NOTE — ED NOTES
Report called to Nanou RN   Updated on patient status and POC.   Patient to be admitted to room 5980

## 2025-03-17 NOTE — PROGRESS NOTES
Hospitalist Progress Note:  Patient seen and examined at bedside at dialysis unit. Patient was admitted earlier today. Currently feeling better, no chest pain or SOB. H&P and Chart reviewed. Noted cardiology team, obtain echo, tsh, d-dimer. See orders, otherwise continue current management.

## 2025-03-17 NOTE — PROCEDURES
PROCEDURE NOTE  Date: 3/17/2025   Name: Kristine Ramirez  YOB: 1975    Procedures      Renal HD note  Patient seen on HD.  Tolerating 3 L fluid removal.  EDW 70 kg.  Pre-HD weight 73.2 kg.  Post HD which should be around EDW.  Patient's EDW may have decreased.  Additional HD tomorrow in the setting of pericardial effusion.    Discussed plan with HD nurse    Dillon Hilliard MD

## 2025-03-17 NOTE — PROGRESS NOTES
03/17/25 0427   RT Protocol   History Pulmonary Disease 1   Respiratory pattern 0   Breath sounds 0   Cough 0   Indications for Bronchodilator Therapy On home bronchodilators   Bronchodilator Assessment Score 1

## 2025-03-17 NOTE — ED NOTES
Patient Name: Kristine Ramirez  : 1975 49 y.o.  MRN: [id number]  ED Room #: ED-0022/22     Chief complaint:   Chief Complaint   Patient presents with    Fall     Patient arrives from home via [facility] with complaints of fall. Patient reports that she slid out of bed twice today and hit her head. Patient reports being SOB. Hx of kidney failure and stroke.      Hospital Problem/Diagnosis:   Hospital Problems           Last Modified POA    * (Principal) Pericardial effusion 3/17/2025 Yes         O2 Flow Rate:O2 Device: None (Room air)   (if applicable)  Cardiac Rhythm:   (if applicable)  Active LDA's:   Peripheral IV 25 Left;Proximal;Anterior Forearm (Active)   Site Assessment Clean, dry & intact 25   Line Status Normal saline locked 25   Dressing Status New dressing applied;Clean, dry & intact 25   Dressing Intervention New 25            How does patient ambulate? Unknown, did not assess in the Emergency Department    2. How does patient take pills? Whole with Water    3. Is patient alert? Alert    4. Is patient oriented? To Person, To Place, To Time, To Situation, and Follows Commands    5.   Patient arrived from:  home  Facility Name: ___________________________________________    6. If patient is disoriented or from a Skill Nursing Facility has family been notified of admission?     7. Patient belongings? Belongings: Cell Phone and Clothing    Disposition of belongings? Kept with Patient     8. Any specific patient or family belongings/needs/dynamics?   a.     9. Miscellaneous comments/pending orders?  a.       If there are any additional questions please reach out to the Emergency Department.

## 2025-03-17 NOTE — PROGRESS NOTES
Treatment time: 3.5 hours  Net UF: 2500 ml     Pre weight: 73.2 kg  Post weight:70.7 kg       Access used: [facility] AVF    Access function: well with  ml/min     Medications or blood products given: Na     Regular outpatient schedule: MWF     Summary of response to treatment: Patient tolerated treatment well and without any complications. Patient remained stable throughout entire treatment.  The patient was hypertensive during treatment.  She remained asymptomatic.

## 2025-03-17 NOTE — CARE COORDINATION
Wound Referral Progress Note       NAME:  Kristine Ramirez  MEDICAL RECORD NUMBER:  [id number]  AGE: 49 y.o.   GENDER: female  : 1975  TODAY'S DATE:  3/17/2025    Subjective   Reason for WOCN Evaluation and Assessment: wounds to feet      Kristine Ramirez is a 49 y.o. female referred by:   Provider and Nursing    Wound Identification:  Wound Type: pressure  Contributing Factors: none    Wound History: present on admission  Current Wound Care Treatment:  open to air    Patient Care Goal:  Wound Healing        PAST MEDICAL HISTORY        Diagnosis Date    Acute respiratory failure due to COVID-19 ([facility]) 10/16/2021    Arterial ischemic stroke, ICA, left, acute ([facility])     Blind in both eyes     Cerebral artery occlusion with cerebral infarction ([facility])     CHF (congestive heart failure) ([facility])     Chronic kidney disease     COPD (chronic obstructive pulmonary disease) ([facility])     Depression     Diabetes mellitus out of control     Diabetes mellitus, type II ([facility])         Diabetic neuropathy associated with type 2 diabetes mellitus ([facility])     Generalized headaches     Hypertension     Infertility     Insomnia     chronic vs lack of time spent to sleep    Migraine headache 2011    Mixed hyperlipidemia     Otitis media     h/o recurrent    Pelvic abscess in female 10/05/2013    Pneumonia     2004 approx.    Stroke ([facility]) 2020    Stroke ([facility]) 2021       PAST SURGICAL HISTORY    Past Surgical History:   Procedure Laterality Date    CERVIX SURGERY      laser tx for dysplasia;    DIALYSIS FISTULA CREATION Right 2/10/2022    RIGHT BRACHIO CEPHALIC FISTULA CREATION performed by Christiano Lomeli II, MD at [facility] OR    EYE SURGERY      FOOT SURGERY Right     FOOT SURGERY Bilateral     FOOT SURGERY Left     IR TUNNELED CVC PLACE WO SQ PORT/PUMP > 5 YEARS  2021    IR TUNNELED CATHETER PLACEMENT GREATER THAN 5 YEARS 2021 Omari Carson MD [facility] SPECIAL PROCEDURES    UPPER  [facility]  [phone]  on 3/17/2025 at 5:04 PM

## 2025-03-17 NOTE — ED PROVIDER NOTES
EMERGENCY DEPARTMENT PROVIDER NOTE         PATIENT IDENTIFICATION     Name:   Kristine Ramirez  MRN:   [id number]  YOB: 1975  Date of Evaluation:   3/17/2025  Provider:   Marcelina Motta NP; Ezio Mir DO  PCP:   Damon Mireles MD        CHIEF COMPLAINT       Fall (Patient arrives from home via [facility] with complaints of fall. Patient reports that she slid out of bed twice today and hit her head. Patient reports being SOB. Hx of kidney failure and stroke. )        HISTORY OF PRESENT ILLNESS     I independently interviewed patient and/or caretaker(s).  See Advanced Practice Provider (KANDY) note for full HPI.  In summary, Kristine Ramirez  is a(n) 49 y.o. female who presents with generalized weakness.  States that she is slid out of bed twice over the last day or so.  Affirms back pain and left-sided chest pain ever since then.  She affirms history of ESRD for which she is dialysis compliant on Monday Wednesday Friday (Dr. Coon).  Last went on 3/14/2025.  She affirms history of CHF.        PHYSICAL EXAM     I reviewed physical exam performed and documented by KANDY.  I performed an independent physical examination with findings as follows:  Chronically ill-appearing female with no respiratory distress on room air.  Does have some faint Rales in the bilateral lung fields.  Normal tachycardic heart sounds with 2+ metric pulses no distal extremities.  No edema.        EKG INTERPRETATION     TIME:   0050  RATE:   114 bpm  AZ INTERVAL:   130 ms  QRS DURATION:   76 ms  QT/QTc:   318/438 ms  RHYTHM:   Sinus tachycardia  AXIS:   Regular  ABNORMALITIES  No STEMI  T wave inversions in lead(s) 1, 2, aVL, and V5-6    PRIOR EKG:    3/4/25- current EKG without significant changes when compared to prior    INTERPRETATION:  Nonspecific, unchanged from prior    REVIEWED BY:   Ezio Mir Jr., DO    EKG was independently reviewed by emergency department physician in absence of  chloride infusion (has no administration in time range)   ondansetron (ZOFRAN-ODT) disintegrating tablet 4 mg (has no administration in time range)     Or   ondansetron (ZOFRAN) injection 4 mg (has no administration in time range)   polyethylene glycol (GLYCOLAX) packet 17 g (has no administration in time range)   acetaminophen (TYLENOL) tablet 650 mg (has no administration in time range)     Or   acetaminophen (TYLENOL) suppository 650 mg (has no administration in time range)   sulfur hexafluoride microspheres (LUMASON) 60.7-25 MG injection 2 mL (has no administration in time range)   heparin (porcine) injection 5,000 Units (has no administration in time range)   HYDROcodone-acetaminophen (NORCO) 5-325 MG per tablet 1 tablet (1 tablet Oral Given 3/17/25 0307)     Social determinants of health:   None applicable    Chronic conditions potentially affecting care:    ESRD and due to risk of fluid overload as well as uremic pericarditis        CRITICAL CARE     Upon my evaluation, this patient had a high probability of imminent or life-threatening deterioration due to hypoxia requiring supplemental oxygen which required my direct attention, intervention, and personal management.    The total critical care time personally spent while evaluating and treating this patient was 41 minutes exclusive of any time spent doing separately billable procedures.  This includes time at the bedside, data interpretation, medication management, monitoring for potential decompensation and physician consultation.  Specifics of interventions taken and potentially life-threatening diagnostic considerations are listed above in the medical decision making.        FINAL IMPRESSION     1. Generalized weakness    2. Multiple falls    3. Acute pericarditis, unspecified type    4. ESRD (end stage renal disease) on dialysis (HCC)    5. Acute respiratory failure with hypoxia and hypercapnia ([facility])    6. Pericardial effusion          DISPOSITION/PLAN

## 2025-03-17 NOTE — CONSULTS
diagnostic interventions  Discussed with primary team     Assessment  Patient Active Problem List   Diagnosis    Type 2 diabetes mellitus with hyperlipidemia (HCC)    Mixed hyperlipidemia    Migraine headache    Anemia    Diabetic foot infection (HCC)    Pyogenic inflammation of bone (HCC)    History of medication noncompliance    Osteomyelitis of left foot (HCC)    Nephrotic syndrome    Peripheral edema    Pulmonary edema    Right sided numbness    Tobacco dependence    Chronic kidney disease (CKD), stage III (moderate) (HCC)    H/O: CVA (cerebrovascular accident)    HTN (hypertension), benign    DM (diabetes mellitus), secondary, uncontrolled, 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, associated with type 2 diabetes mellitus (HCC)    Cellulitis of right foot    Hidradenitis suppurativa    Hypocalcemia    Non-toxic multinodular goiter    Polyneuropathy due to type 2 diabetes mellitus (HCC)    Proliferative diabetic retinopathy associated with type 2 diabetes mellitus (HCC)    Epiglottitis    Recurrent falls    Hypotension    Leukocytosis    Volume overload    Hemodialysis catheter dysfunction    Hypoproteinemia    GABRIELA (obstructive sleep apnea)    Type 2 diabetes mellitus with obesity (HCC)    Wheelchair dependence    Hyperkalemia    Suspected COVID-19 virus infection    Dependent on walker for ambulation    Medication management contract agreement - signed on 5/18/2023    Controlled drug dependence (HCC)    Generalized anxiety disorder with panic attacks    Coagulation defect    Chronic obstructive pulmonary disease, unspecified    Abdominal distention    Encounter for assessment of ascites    Other ascites    Physical deconditioning    AMS (altered mental status)    Primary hypertension     Cardiomyopathy, unspecified type (HCC)    Pneumonia, bacterial    Pericardial effusion         Plan:    I had the opportunity to review the clinical symptoms and presentation of Kristine Ramirez.     Assessment/Plan:  Principal Problem:   1. Concern for pericardial effusion  - new problem  Plan  - obtain echo  - tsh    2. Chronically elevated troponin and atypical chest pain  - flat trend, not consistent with acs  - differential: pe, ischemia, msk, demand, pericardial effusion   Plan  - obtain echo  - check ddimer, intermediate pretest probability of PE  - will need ischemic evaluation     3. Acute on chronic systolic heart failure  - acc c nyha  Plan  - obtain echo    4. Tachycardia and leukocytosis  - new problem  Plan  - evaluation for sepsis per primary team     4. ESRD on HD  - new problem  Plan  - needs more dialysis     5. Long standing diabetes    6. Stroke    7. Blind     8. Smoker    9. Foot wounds  - per primary  Plan  - consult wound care        I will address the patient's cardiac risk factors and adjusted pharmacologic treatment as needed. In addition, I have reinforced the need for patient directed risk factor modification.    Tobacco use was discussed with the patient and educated on the negative effects. I have asked the patient to not utilize these agents.    Thank you for allowing to us to participate in the care or Kristine Ramirez. Further evaluation will be based upon the patient's clinical course and testing results.    All questions and concerns were addressed to the patient/family. Alternatives to my treatment were discussed. The note was completed using EMR. Every effort was made to ensure accuracy; however, inadvertent computerized transcription errors may be present.       All questions and concerns were addressed to the patient/family. Alternatives to my treatment were discussed. The note was completed using EMR. Every effort was made to ensure accuracy; however, inadvertent

## 2025-03-17 NOTE — ED PROVIDER NOTES
[facility] EMERGENCY DEPARTMENT  EMERGENCY DEPARTMENT ENCOUNTER        Pt Name: Kristine Ramirez  MRN: [id number]  Birthdate 1975  Date of evaluation: 3/17/2025  Provider: RIVERA Moore - CNP  PCP: Damon Mireles MD  Note Started: 1:52 AM EDT 3/17/25       I have seen and evaluated this patient with my supervising physician Ezio Mir DO.      CHIEF COMPLAINT       Chief Complaint   Patient presents with    Fall     Patient arrives from home via [facility] with complaints of fall. Patient reports that she slid out of bed twice today and hit her head. Patient reports being SOB. Hx of kidney failure and stroke.        HISTORY OF PRESENT ILLNESS: 1 or more Elements     History From: patient and ems  Limitations to history : None    Kristine Ramirez is a 49 y.o. female who presents to the emergency department with complaint of increasing weakness with 2 falls from bed today.  EMS reports that the patient was hypoxic at home, she does not require supplemental oxygen typically.  Patient reports that she was on the ground for about 6 hours collectively.  She is complaining of pain to mid back, left hip, and left shoulder.  She is a hemodialysis patient, Monday Wednesday Friday has not missed dialysis.  She denies fever.    Hx of CVA residual right sided weakness and she is blind in the left eye chronically.    Denies any headache, fever, lightheadedness, dizziness.  No chest pain or pressure.  No cough, or congestion.  No abdominal pain, nausea, vomiting, diarrhea, constipation, or dysuria.  No rash.    Nursing Notes were all reviewed and agreed with or any disagreements were addressed in the HPI.    REVIEW OF SYSTEMS :      Review of Systems   Constitutional:  Negative for activity change, chills and fever.   Respiratory:  Positive for shortness of breath. Negative for chest tightness.    Cardiovascular:  Negative for chest pain.   Gastrointestinal:  Negative for  oxygen.    Disposition Considerations (tests considered but not done, Admit vs D/C, Shared Decision Making, Pt Expectation of Test or Tx.): Shared decision making: Initial differential diagnoses were discussed with this patient, along with physical exam findings and an explanation what evaluation studies were necessary and why. Labs and Imaging results were explained to the patient in detail, including explanation of what these results mean. All treatment and disposition options were discussed with the patient and a treatment plan with the patient's best short and long term care was made in collaboration with the patient.    I did consider ECHO      I am the Primary Clinician of Record.  FINAL IMPRESSION      1. Generalized weakness    2. Multiple falls    3. Acute pericarditis, unspecified type    4. ESRD (end stage renal disease) on dialysis (HCC)    5. Acute respiratory failure with hypoxia and hypercapnia (HCC)          DISPOSITION/PLAN     DISPOSITION Decision To Admit 03/17/2025 02:00:37 AM   DISPOSITION CONDITION Stable           PATIENT REFERRED TO:  No follow-up provider specified.    DISCHARGE MEDICATIONS:  New Prescriptions    No medications on file       DISCONTINUED MEDICATIONS:  Discontinued Medications    No medications on file              (Please note that portions of this note were completed with a voice recognition program.  Efforts were made to edit the dictations but occasionally words are mis-transcribed.)    RIVERA Moore CNP (electronically signed)        Marcelina Motta APRN - CNP  03/17/25 0204

## 2025-03-17 NOTE — CONSULTS
[facility]  Consultation Note    Reason for Consult: ESRD management, hyponatremia, pericardial effusion  Requesting Physician:  Dr. UTE Raza    CHIEF COMPLAINT: Fatigue and shortness of breath    History obtained from records and patient.    HISTORY OF PRESENT ILLNESS:                Kristine Ramirez  is 49 y.o. y.o. female with significant past medical history of ESRD, on HD MWF at [facility] under our care, CVA, CHF, diabetes mellitus type 2, depression, hypertension, migraine headaches, hyperlipidemia who presents with shortness of breath and fatigue.  Noted to have pericardial effusion.  I am asked to see the patient with regards to her HD need.  Currently undergoing HD.  Dry weight listed at 70 kg.  Pre-HD weight 73.2 kg.  Tolerating 3 L fluid removal.  Systolic blood pressure ranging from 120s to 160s.  BUN of 61 and creatinine of 7.  Potassium of 4.9 and serum bicarbonate of 22.  Sodium mildly low at 130.  CT showed small pleural effusions and large pericardial effusion.  Currently seen on HD.    Past Medical History:     has a past medical history of Acute respiratory failure due to COVID-19 ([facility]), Arterial ischemic stroke, ICA, left, acute ([facility]), Blind in both eyes, Cerebral artery occlusion with cerebral infarction ([facility]), CHF (congestive heart failure) ([facility]), Chronic kidney disease, COPD (chronic obstructive pulmonary disease) ([facility]), Depression, Diabetes mellitus out of control, Diabetes mellitus, type II ([facility]), Diabetic neuropathy associated with type 2 diabetes mellitus ([facility]), Generalized headaches, Hypertension, Infertility, Insomnia, Migraine headache, Mixed hyperlipidemia, Otitis media, Pelvic abscess in female, Pneumonia, Stroke ([facility]), and Stroke ([facility]).   Past Surgical History:     has a past surgical history that includes Cervix surgery; eye surgery; Foot surgery (Right); Foot surgery (Bilateral); Foot surgery (Left); IR TUNNELED CVC PLACE WO SQ  CVA  6.  History of systolic CHF    Thank you for the consult.  We will follow this patient along the hospitalization.    Dillon Hilliard MD

## 2025-03-18 ENCOUNTER — APPOINTMENT (OUTPATIENT)
Age: 50
End: 2025-03-18
Attending: INTERNAL MEDICINE
Payer: COMMERCIAL

## 2025-03-18 LAB
ALBUMIN SERPL-MCNC: 3.8 G/DL (ref 3.4–5)
ALBUMIN/GLOB SERPL: 1 {RATIO} (ref 1.1–2.2)
ALP SERPL-CCNC: 323 U/L (ref 40–129)
ALT SERPL-CCNC: <5 U/L (ref 10–40)
AMYLASE FLD-CCNC: 33 U/L
ANION GAP SERPL CALCULATED.3IONS-SCNC: 19 MMOL/L (ref 3–16)
APPEARANCE FLUID: NORMAL
AST SERPL-CCNC: 20 U/L (ref 15–37)
BASOPHILS # BLD: 0.1 K/UL (ref 0–0.2)
BASOPHILS NFR BLD: 0.6 %
BDY FLUID QUALITY: NORMAL
BILIRUB SERPL-MCNC: 0.8 MG/DL (ref 0–1)
BUN SERPL-MCNC: 38 MG/DL (ref 7–20)
CALCIUM SERPL-MCNC: 8.8 MG/DL (ref 8.3–10.6)
CELL COUNT FLUID TYPE: NORMAL
CHLORIDE SERPL-SCNC: 95 MMOL/L (ref 99–110)
CO2 SERPL-SCNC: 21 MMOL/L (ref 21–32)
COLOR FLUID: NORMAL
CREAT SERPL-MCNC: 5.1 MG/DL (ref 0.6–1.1)
DEPRECATED RDW RBC AUTO: 19.3 % (ref 12.4–15.4)
ECHO BSA: 1.78 M2
ECHO BSA: 1.78 M2
ECHO LV EF PHYSICIAN: 60 %
EOSINOPHIL # BLD: 0 K/UL (ref 0–0.6)
EOSINOPHIL NFR BLD: 0.1 %
GFR SERPLBLD CREATININE-BSD FMLA CKD-EPI: 10 ML/MIN/{1.73_M2}
GLUCOSE FLD-MCNC: 131 MG/DL
GLUCOSE SERPL-MCNC: 196 MG/DL (ref 70–99)
HCT VFR BLD AUTO: 35.5 % (ref 36–48)
HGB BLD-MCNC: 11.7 G/DL (ref 12–16)
LDH FLD L TO P-CCNC: 947 U/L
LYMPHOCYTES # BLD: 0.8 K/UL (ref 1–5.1)
LYMPHOCYTES NFR BLD: 5.8 %
LYMPHOCYTES NFR FLD: 12 %
MCH RBC QN AUTO: 29.9 PG (ref 26–34)
MCHC RBC AUTO-ENTMCNC: 32.9 G/DL (ref 31–36)
MCV RBC AUTO: 90.6 FL (ref 80–100)
MONOCYTES # BLD: 1.3 K/UL (ref 0–1.3)
MONOCYTES NFR BLD: 10.3 %
MONOCYTES NFR FLD: 8 %
NEUTROPHIL, FLUID: 80 %
NEUTROPHILS # BLD: 10.8 K/UL (ref 1.7–7.7)
NEUTROPHILS NFR BLD: 83.2 %
NUC CELL # FLD: 3079 /CUMM
PHOSPHATE SERPL-MCNC: 6.3 MG/DL (ref 2.5–4.9)
PLATELET # BLD AUTO: 173 K/UL (ref 135–450)
PMV BLD AUTO: 8.3 FL (ref 5–10.5)
POTASSIUM SERPL-SCNC: 4.5 MMOL/L (ref 3.5–5.1)
PROT FLD-MCNC: 5.3 G/DL
PROT SERPL-MCNC: 7.7 G/DL (ref 6.4–8.2)
RBC # BLD AUTO: 3.92 M/UL (ref 4–5.2)
RBC FLUID: NORMAL /CUMM
SODIUM SERPL-SCNC: 135 MMOL/L (ref 136–145)
SPECIMEN SOURCE FLD: NORMAL
TOTAL CELLS COUNTED FLD: 100
TSH SERPL DL<=0.005 MIU/L-ACNC: 3.07 UIU/ML (ref 0.27–4.2)
WBC # BLD AUTO: 13 K/UL (ref 4–11)

## 2025-03-18 PROCEDURE — 6360000002 HC RX W HCPCS: Performed by: INTERNAL MEDICINE

## 2025-03-18 PROCEDURE — 82945 GLUCOSE OTHER FLUID: CPT

## 2025-03-18 PROCEDURE — 84443 ASSAY THYROID STIM HORMONE: CPT

## 2025-03-18 PROCEDURE — C1894 INTRO/SHEATH, NON-LASER: HCPCS | Performed by: INTERNAL MEDICINE

## 2025-03-18 PROCEDURE — 36415 COLL VENOUS BLD VENIPUNCTURE: CPT

## 2025-03-18 PROCEDURE — 6360000002 HC RX W HCPCS: Performed by: NURSE PRACTITIONER

## 2025-03-18 PROCEDURE — 85025 COMPLETE CBC W/AUTO DIFF WBC: CPT

## 2025-03-18 PROCEDURE — 6370000000 HC RX 637 (ALT 250 FOR IP): Performed by: NURSE PRACTITIONER

## 2025-03-18 PROCEDURE — 87205 SMEAR GRAM STAIN: CPT

## 2025-03-18 PROCEDURE — 93308 TTE F-UP OR LMTD: CPT

## 2025-03-18 PROCEDURE — 6370000000 HC RX 637 (ALT 250 FOR IP): Performed by: HOSPITALIST

## 2025-03-18 PROCEDURE — C1769 GUIDE WIRE: HCPCS | Performed by: INTERNAL MEDICINE

## 2025-03-18 PROCEDURE — 2709999900 HC NON-CHARGEABLE SUPPLY: Performed by: INTERNAL MEDICINE

## 2025-03-18 PROCEDURE — 87070 CULTURE OTHR SPECIMN AEROBIC: CPT

## 2025-03-18 PROCEDURE — 90935 HEMODIALYSIS ONE EVALUATION: CPT

## 2025-03-18 PROCEDURE — 82150 ASSAY OF AMYLASE: CPT

## 2025-03-18 PROCEDURE — 87116 MYCOBACTERIA CULTURE: CPT

## 2025-03-18 PROCEDURE — 87102 FUNGUS ISOLATION CULTURE: CPT

## 2025-03-18 PROCEDURE — 2000000000 HC ICU R&B

## 2025-03-18 PROCEDURE — 99152 MOD SED SAME PHYS/QHP 5/>YRS: CPT | Performed by: INTERNAL MEDICINE

## 2025-03-18 PROCEDURE — 80053 COMPREHEN METABOLIC PANEL: CPT

## 2025-03-18 PROCEDURE — 84157 ASSAY OF PROTEIN OTHER: CPT

## 2025-03-18 PROCEDURE — 94640 AIRWAY INHALATION TREATMENT: CPT

## 2025-03-18 PROCEDURE — 88305 TISSUE EXAM BY PATHOLOGIST: CPT

## 2025-03-18 PROCEDURE — 33016 PERICARDIOCENTESIS W/IMAGING: CPT | Performed by: INTERNAL MEDICINE

## 2025-03-18 PROCEDURE — 0W9D3ZZ DRAINAGE OF PERICARDIAL CAVITY, PERCUTANEOUS APPROACH: ICD-10-PCS | Performed by: INTERNAL MEDICINE

## 2025-03-18 PROCEDURE — 88112 CYTOPATH CELL ENHANCE TECH: CPT

## 2025-03-18 PROCEDURE — 99233 SBSQ HOSP IP/OBS HIGH 50: CPT | Performed by: INTERNAL MEDICINE

## 2025-03-18 PROCEDURE — 2500000003 HC RX 250 WO HCPCS: Performed by: INTERNAL MEDICINE

## 2025-03-18 PROCEDURE — 99153 MOD SED SAME PHYS/QHP EA: CPT | Performed by: INTERNAL MEDICINE

## 2025-03-18 PROCEDURE — 87206 SMEAR FLUORESCENT/ACID STAI: CPT

## 2025-03-18 PROCEDURE — 89051 BODY FLUID CELL COUNT: CPT

## 2025-03-18 PROCEDURE — 5A1D70Z PERFORMANCE OF URINARY FILTRATION, INTERMITTENT, LESS THAN 6 HOURS PER DAY: ICD-10-PCS | Performed by: INTERNAL MEDICINE

## 2025-03-18 PROCEDURE — 84100 ASSAY OF PHOSPHORUS: CPT

## 2025-03-18 PROCEDURE — 93308 TTE F-UP OR LMTD: CPT | Performed by: INTERNAL MEDICINE

## 2025-03-18 PROCEDURE — 83615 LACTATE (LD) (LDH) ENZYME: CPT

## 2025-03-18 PROCEDURE — C1729 CATH, DRAINAGE: HCPCS | Performed by: INTERNAL MEDICINE

## 2025-03-18 PROCEDURE — 33017 PRCRD DRG 6YR+ W/O CGEN CAR: CPT | Performed by: INTERNAL MEDICINE

## 2025-03-18 RX ORDER — METOPROLOL TARTRATE 1 MG/ML
5 INJECTION, SOLUTION INTRAVENOUS ONCE
Status: COMPLETED | OUTPATIENT
Start: 2025-03-18 | End: 2025-03-18

## 2025-03-18 RX ORDER — CEFAZOLIN SODIUM 1 G/3ML
INJECTION, POWDER, FOR SOLUTION INTRAMUSCULAR; INTRAVENOUS PRN
Status: DISCONTINUED | OUTPATIENT
Start: 2025-03-18 | End: 2025-03-18 | Stop reason: HOSPADM

## 2025-03-18 RX ORDER — FENTANYL CITRATE 50 UG/ML
INJECTION, SOLUTION INTRAMUSCULAR; INTRAVENOUS PRN
Status: DISCONTINUED | OUTPATIENT
Start: 2025-03-18 | End: 2025-03-18 | Stop reason: HOSPADM

## 2025-03-18 RX ORDER — MIDAZOLAM HYDROCHLORIDE 1 MG/ML
INJECTION, SOLUTION INTRAMUSCULAR; INTRAVENOUS PRN
Status: DISCONTINUED | OUTPATIENT
Start: 2025-03-18 | End: 2025-03-18 | Stop reason: HOSPADM

## 2025-03-18 RX ORDER — SODIUM CHLORIDE 0.9 % (FLUSH) 0.9 %
5-40 SYRINGE (ML) INJECTION EVERY 12 HOURS SCHEDULED
Status: DISCONTINUED | OUTPATIENT
Start: 2025-03-18 | End: 2025-03-21 | Stop reason: HOSPADM

## 2025-03-18 RX ORDER — SODIUM CHLORIDE 9 MG/ML
INJECTION, SOLUTION INTRAVENOUS PRN
Status: DISCONTINUED | OUTPATIENT
Start: 2025-03-18 | End: 2025-03-21 | Stop reason: HOSPADM

## 2025-03-18 RX ORDER — ACETAMINOPHEN 325 MG/1
650 TABLET ORAL EVERY 4 HOURS PRN
Status: DISCONTINUED | OUTPATIENT
Start: 2025-03-18 | End: 2025-03-21 | Stop reason: HOSPADM

## 2025-03-18 RX ORDER — LIDOCAINE HYDROCHLORIDE 10 MG/ML
INJECTION, SOLUTION INFILTRATION; PERINEURAL PRN
Status: DISCONTINUED | OUTPATIENT
Start: 2025-03-18 | End: 2025-03-18 | Stop reason: HOSPADM

## 2025-03-18 RX ORDER — SODIUM CHLORIDE 0.9 % (FLUSH) 0.9 %
5-40 SYRINGE (ML) INJECTION PRN
Status: DISCONTINUED | OUTPATIENT
Start: 2025-03-18 | End: 2025-03-21 | Stop reason: HOSPADM

## 2025-03-18 RX ADMIN — METOPROLOL TARTRATE 25 MG: 25 TABLET, FILM COATED ORAL at 21:24

## 2025-03-18 RX ADMIN — PANTOPRAZOLE SODIUM 40 MG: 40 TABLET, DELAYED RELEASE ORAL at 14:40

## 2025-03-18 RX ADMIN — AMLODIPINE BESYLATE 5 MG: 5 TABLET ORAL at 15:19

## 2025-03-18 RX ADMIN — CLONIDINE HYDROCHLORIDE 0.2 MG: 0.1 TABLET ORAL at 14:40

## 2025-03-18 RX ADMIN — ALPRAZOLAM 0.5 MG: 0.5 TABLET ORAL at 15:19

## 2025-03-18 RX ADMIN — SEVELAMER CARBONATE 2400 MG: 800 TABLET, FILM COATED ORAL at 17:30

## 2025-03-18 RX ADMIN — HEPARIN SODIUM 5000 UNITS: 5000 INJECTION INTRAVENOUS; SUBCUTANEOUS at 06:50

## 2025-03-18 RX ADMIN — CLONIDINE HYDROCHLORIDE 0.2 MG: 0.1 TABLET ORAL at 21:24

## 2025-03-18 RX ADMIN — HEPARIN SODIUM 5000 UNITS: 5000 INJECTION INTRAVENOUS; SUBCUTANEOUS at 14:39

## 2025-03-18 RX ADMIN — Medication 2 PUFF: at 03:46

## 2025-03-18 RX ADMIN — SODIUM CHLORIDE, PRESERVATIVE FREE 10 ML: 5 INJECTION INTRAVENOUS at 21:26

## 2025-03-18 RX ADMIN — HEPARIN SODIUM 5000 UNITS: 5000 INJECTION INTRAVENOUS; SUBCUTANEOUS at 21:24

## 2025-03-18 RX ADMIN — METOPROLOL TARTRATE 5 MG: 5 INJECTION INTRAVENOUS at 18:28

## 2025-03-18 RX ADMIN — AMIODARONE HYDROCHLORIDE 150 MG: 1.5 INJECTION, SOLUTION INTRAVENOUS at 19:04

## 2025-03-18 RX ADMIN — PANTOPRAZOLE SODIUM 40 MG: 40 TABLET, DELAYED RELEASE ORAL at 06:50

## 2025-03-18 ASSESSMENT — ENCOUNTER SYMPTOMS
COUGH: 0
NAUSEA: 0
VOMITING: 0
DIARRHEA: 0
PHOTOPHOBIA: 0
FACIAL SWELLING: 0
SHORTNESS OF BREATH: 1
EYE DISCHARGE: 0
EYE REDNESS: 0
CONSTIPATION: 0
CHEST TIGHTNESS: 0
BLOOD IN STOOL: 0
ABDOMINAL PAIN: 0
ABDOMINAL DISTENTION: 0

## 2025-03-18 ASSESSMENT — PAIN SCALES - GENERAL: PAINLEVEL_OUTOF10: 0

## 2025-03-18 NOTE — PROGRESS NOTES
pericardial effusion present. No evidence for RV diastolic collapse. Minimal right atrial changes. Findings reviewed with Dr Segura. Pericardiocentesis likely to be done tomorrow    Image quality is adequate. Contrast used: Lumason.    Stress Test:     Cath:    Other imaging:     Assessment and Plan     -Moderate large pericardial effusion.  Plan pericardiocentesis diagnostic.    Chronic elevation troponin with atypical chest pain not consistent with acute coronary syndrome.    History of chronic systolic heart failure current ejection fraction 50%.    End-stage renal disease on hemodialysis.    Foot wound consider arterial Doppler    Thank you for allowing us to participate in the care of Kristine Ramirez.  Please do not hesitate to contact me if you have any questions.    Bartolo Hawley MD, MPH    [facility]  [address], [address]   [place] [address]  Ph: [phone]  Fax: [phone]    3/18/2025 9:23 AM

## 2025-03-18 NOTE — PROGRESS NOTES
Pt arrived to CVU 2907 from cath lab. JAXON drain in place. Per cath lab, 1L drained (pericardial). All needs met at this time. Call light in reach

## 2025-03-18 NOTE — PROGRESS NOTES
Occupational/Physical Therapy  Kristine Ramirez    OT/PT orders received and chart reviewed. Patient off the floor at dialysis.   Will attempt to see when patient returns.     Thank you,   Hallie Ashley, [facility] OTR/L [id number]  Raissa Castillo PT, DPT, [id number]

## 2025-03-18 NOTE — FLOWSHEET NOTE
Patient converted to SR with lopressor- per  only give IV Amio bolus at this time, care ongoing    03/18/25 1834   Vitals   Pulse 89   Respirations (!) 8   Cardiac Rhythm (S)  Sinus rhythm   Oxygen Therapy   SpO2 96 %

## 2025-03-18 NOTE — PROGRESS NOTES
[facility] Note    Patient Active Problem List   Diagnosis    Type 2 diabetes mellitus with hyperlipidemia (HCC)    Mixed hyperlipidemia    Migraine headache    Anemia    Diabetic foot infection (HCC)    Pyogenic inflammation of bone (HCC)    History of medication noncompliance    Osteomyelitis of left foot (HCC)    Nephrotic syndrome    Peripheral edema    Pulmonary edema    Right sided numbness    Tobacco dependence    Chronic kidney disease (CKD), stage III (moderate) (HCC)    H/O: CVA (cerebrovascular accident)    HTN (hypertension), benign    DM (diabetes mellitus), secondary, uncontrolled, 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, associated with type 2 diabetes mellitus (HCC)    Cellulitis of right foot    Hidradenitis suppurativa    Hypocalcemia    Non-toxic multinodular goiter    Polyneuropathy due to type 2 diabetes mellitus (HCC)    Proliferative diabetic retinopathy associated with type 2 diabetes mellitus (HCC)    Epiglottitis    Recurrent falls    Hypotension    Leukocytosis    Volume overload    Hemodialysis catheter dysfunction    Hypoproteinemia    GABRIELA (obstructive sleep apnea)    Type 2 diabetes mellitus with obesity (HCC)    Wheelchair dependence    Hyperkalemia    Suspected COVID-19 virus infection    Dependent on walker for ambulation    Medication management contract agreement - signed on 5/18/2023    Controlled drug dependence (HCC)    Generalized anxiety disorder with panic attacks    Coagulation defect    Chronic obstructive pulmonary disease, unspecified    Abdominal distention    Encounter for assessment of ascites    Other ascites    Physical deconditioning    AMS (altered mental status)    Primary hypertension    Cardiomyopathy, unspecified type    Hematological:  Does not bruise/bleed easily.   Psychiatric/Behavioral:  Negative for agitation, confusion and hallucinations.        Objective:      BP (!) 174/86   Pulse 94   Temp 98.1 °F (36.7 °C)   Resp 18   Ht 1.702 m (5' 7\")   Wt 67.4 kg (148 lb 9.4 oz)   LMP 09/01/2022   SpO2 100%   BMI 23.27 kg/m²     Wt Readings from Last 3 Encounters:   03/18/25 67.4 kg (148 lb 9.4 oz)   03/05/25 74.2 kg (163 lb 8 oz)   01/30/25 80.5 kg (177 lb 8 oz)       BP Readings from Last 3 Encounters:   03/18/25 (!) 174/86   03/05/25 (!) 173/80   01/30/25 (!) 160/82         Chest-rhonchi b/l  Heart-regular  Abd-soft  Ext- no edema    Labs  Hemoglobin   Date Value Ref Range Status   03/18/2025 11.7 (L) 12.0 - 16.0 g/dL Final     Hematocrit   Date Value Ref Range Status   03/18/2025 35.5 (L) 36.0 - 48.0 % Final     WBC   Date Value Ref Range Status   03/18/2025 13.0 (H) 4.0 - 11.0 K/uL Final     Platelets   Date Value Ref Range Status   03/18/2025 173 135 - 450 K/uL Final     Lab Results   Component Value Date    CREATININE 5.1 (HH) 03/18/2025    BUN 38 (H) 03/18/2025     (L) 03/18/2025    K 4.5 03/18/2025    CL 95 (L) 03/18/2025    CO2 21 03/18/2025     CK 81  Albumin-3.8    Assessment/Plan:  1.  ESRD-outpatient HD MWF at [facility] under our care.  EDW listed at 70 kg.  EDW may be lower.  With pericardial effusion, do extra HD today.  Fluid removal as tolerated.  Back to schedule tomorrow.   2.  Mild hyponatremia-follow with HD.  1 L per 24-hour fluid restriction.  Better.   3.  Hypertension-currently higher.  Continue amlodipine 5 mg daily, clonidine 0.2 mg 3 times daily, metoprolol 25 mg twice daily.  Fluid removal challenge.   4.  Pericardial effusion-Cardiology also following.  Aggressive RRT.  5.  History of CVA  6.  History of systolic CHF    Dillon Hilliard MD

## 2025-03-18 NOTE — FLOWSHEET NOTE
Pt noted to be in afib RVR, rates 120-150s. Reported findings to Dr. Hawley. Verbal order received for Lopressor 5mg IV once and amio bolus + gtt to be started.        03/18/25 1800 03/18/25 1819   Vitals   Pulse (!) 103 (!) 134   Respirations (!) 9 14   BP (!) 161/76 113/81   MAP (Calculated) 104 92   MAP (mmHg) 103 92

## 2025-03-18 NOTE — PROGRESS NOTES
administration of intravenous contrast. Multiplanar reformatted images are provided for review. Automated exposure control, iterative reconstruction, and/or weight based adjustment of the mA/kV was utilized to reduce the radiation dose to as low as reasonably achievable. COMPARISON: 03/04/2025. HISTORY: ORDERING SYSTEM PROVIDED HISTORY: fall, head injury TECHNOLOGIST PROVIDED HISTORY: If patient is on cardiac monitor and/or pulse ox, they may be taken off cardiac monitor and pulse ox, left on O2 if currently on. All monitors reattached when patient returns to room. Has a \"code stroke\" or \"stroke alert\" been called?->No Reason for exam:->fall, head injury Decision Support Exception - unselect if not a suspected or confirmed emergency medical condition->Emergency Medical Condition (MA); ORDERING SYSTEM PROVIDED HISTORY: fall, head injury TECHNOLOGIST PROVIDED HISTORY: Reason for exam:->fall, head injury Decision Support Exception - unselect if not a suspected or confirmed emergency medical condition->Emergency Medical Condition (MA) FINDINGS: HEAD: BRAIN/VENTRICLES: There is no acute intracranial hemorrhage, mass effect or midline shift.  No abnormal extra-axial fluid collection. Cortical atrophy and chronic white matter changes in the brain and associated ventricular enlargement are again demonstrated. ORBITS: The visualized portion of the orbits demonstrate no acute abnormality. SINUSES: The visualized paranasal sinuses and mastoid air cells demonstrate no acute abnormality. SOFT TISSUES/SKULL: No acute abnormality of the visualized skull or soft tissues. CERVICAL SPINE: BONES/ALIGNMENT: There is no evidence of an acute cervical spine fracture. No traumatic malalignment. DEGENERATIVE CHANGES: Advanced multilevel degenerative disc disease and facet arthropathy. SOFT TISSUES: There is no prevertebral soft tissue swelling.  Mild subcutaneous edema in the upper thoracic and lower cervical region.     Chronic findings in  portion of the orbits demonstrate no acute abnormality. SINUSES: The visualized paranasal sinuses and mastoid air cells demonstrate no acute abnormality. SOFT TISSUES/SKULL: No acute abnormality of the visualized skull or soft tissues. CERVICAL SPINE: BONES/ALIGNMENT: There is no evidence of an acute cervical spine fracture. No traumatic malalignment. DEGENERATIVE CHANGES: Advanced multilevel degenerative disc disease and facet arthropathy. SOFT TISSUES: There is no prevertebral soft tissue swelling.  Mild subcutaneous edema in the upper thoracic and lower cervical region.     Chronic findings in the brain without acute CT abnormality identified. No acute osseous abnormality identified in the cervical spine.     XR CHEST PORTABLE  Result Date: 3/17/2025  EXAMINATION: ONE XRAY VIEW OF THE CHEST 3/17/2025 12:52 am COMPARISON: 03/04/2025 HISTORY: ORDERING SYSTEM PROVIDED HISTORY: SOB TECHNOLOGIST PROVIDED HISTORY: Reason for exam:->SOB FINDINGS: The cardiomediastinal silhouette is unchanged in appearance.  Prominent cardiac silhouette enlargement again demonstrated, corresponding to previously demonstrated pericardial effusion.  There is no consolidation or pneumothorax identified.  Mild vascular congestion and small pleural effusions.  The osseous structures are unchanged in appearance.     Pronounced cardiac silhouette enlargement again demonstrated consistent with underlying pericardial effusion. Mild interstitial edema and small pleural effusions.       Cultures:  Organism:   Lab Results   Component Value Date/Time    ORG Staphylococcus epidermidis 01/15/2022 04:55 AM       DISCLAIMER: Please note that some or all of this record was generated using voice recognition software. Efforts were made by me to ensure accuracy, however some errors may be present due to limitations of this technology and occasionally words are not transcribed correctly. If there are any questions about the content of this document, please

## 2025-03-18 NOTE — PROGRESS NOTES
Treatment time: 3 hrs    Net UF: 3000 ml     Pre weight: 70.4 kg  Post weight: 67.4 kg     Access used: SOCORRO AVF  Access function:  tolerated well,  BFR 350ml/min     Medications or blood products given: none     Regular outpatient schedule: TTS     Summary of response to treatment: Pt tolerated well. Pt remained stable throughout entire treatment and upon exiting the hemodialysis suite.      Copy of dialysis treatment record placed in chart, to be scanned into EMR.

## 2025-03-19 LAB
ACID FAST STN SPEC QL: NORMAL
ALBUMIN SERPL-MCNC: 3.3 G/DL (ref 3.4–5)
ANION GAP SERPL CALCULATED.3IONS-SCNC: 15 MMOL/L (ref 3–16)
BUN SERPL-MCNC: 28 MG/DL (ref 7–20)
CALCIUM SERPL-MCNC: 8.3 MG/DL (ref 8.3–10.6)
CHLORIDE SERPL-SCNC: 102 MMOL/L (ref 99–110)
CO2 SERPL-SCNC: 23 MMOL/L (ref 21–32)
CREAT SERPL-MCNC: 4.1 MG/DL (ref 0.6–1.1)
GFR SERPLBLD CREATININE-BSD FMLA CKD-EPI: 13 ML/MIN/{1.73_M2}
GLUCOSE SERPL-MCNC: 146 MG/DL (ref 70–99)
LOEFFLER MB STN SPEC: NORMAL
MAGNESIUM SERPL-MCNC: 2.19 MG/DL (ref 1.8–2.4)
PHOSPHATE SERPL-MCNC: 4.8 MG/DL (ref 2.5–4.9)
POTASSIUM SERPL-SCNC: 4 MMOL/L (ref 3.5–5.1)
SODIUM SERPL-SCNC: 140 MMOL/L (ref 136–145)

## 2025-03-19 PROCEDURE — 2000000000 HC ICU R&B

## 2025-03-19 PROCEDURE — 83735 ASSAY OF MAGNESIUM: CPT

## 2025-03-19 PROCEDURE — 99232 SBSQ HOSP IP/OBS MODERATE 35: CPT | Performed by: INTERNAL MEDICINE

## 2025-03-19 PROCEDURE — 94640 AIRWAY INHALATION TREATMENT: CPT

## 2025-03-19 PROCEDURE — 6360000002 HC RX W HCPCS: Performed by: NURSE PRACTITIONER

## 2025-03-19 PROCEDURE — 97165 OT EVAL LOW COMPLEX 30 MIN: CPT

## 2025-03-19 PROCEDURE — 97530 THERAPEUTIC ACTIVITIES: CPT

## 2025-03-19 PROCEDURE — 97116 GAIT TRAINING THERAPY: CPT

## 2025-03-19 PROCEDURE — 90935 HEMODIALYSIS ONE EVALUATION: CPT

## 2025-03-19 PROCEDURE — 80069 RENAL FUNCTION PANEL: CPT

## 2025-03-19 PROCEDURE — 36415 COLL VENOUS BLD VENIPUNCTURE: CPT

## 2025-03-19 PROCEDURE — 6370000000 HC RX 637 (ALT 250 FOR IP): Performed by: NURSE PRACTITIONER

## 2025-03-19 PROCEDURE — 2500000003 HC RX 250 WO HCPCS: Performed by: NURSE PRACTITIONER

## 2025-03-19 PROCEDURE — 6370000000 HC RX 637 (ALT 250 FOR IP): Performed by: HOSPITALIST

## 2025-03-19 PROCEDURE — 97535 SELF CARE MNGMENT TRAINING: CPT

## 2025-03-19 PROCEDURE — 2700000000 HC OXYGEN THERAPY PER DAY

## 2025-03-19 PROCEDURE — 2500000003 HC RX 250 WO HCPCS: Performed by: INTERNAL MEDICINE

## 2025-03-19 PROCEDURE — 97161 PT EVAL LOW COMPLEX 20 MIN: CPT

## 2025-03-19 PROCEDURE — 94761 N-INVAS EAR/PLS OXIMETRY MLT: CPT

## 2025-03-19 RX ORDER — MORPHINE SULFATE 2 MG/ML
1 INJECTION, SOLUTION INTRAMUSCULAR; INTRAVENOUS
Status: DISCONTINUED | OUTPATIENT
Start: 2025-03-19 | End: 2025-03-21 | Stop reason: HOSPADM

## 2025-03-19 RX ORDER — LIDOCAINE HYDROCHLORIDE 10 MG/ML
2 INJECTION, SOLUTION EPIDURAL; INFILTRATION; INTRACAUDAL; PERINEURAL AS NEEDED
Status: DISCONTINUED | OUTPATIENT
Start: 2025-03-19 | End: 2025-03-21 | Stop reason: HOSPADM

## 2025-03-19 RX ADMIN — SODIUM CHLORIDE, PRESERVATIVE FREE 10 ML: 5 INJECTION INTRAVENOUS at 08:28

## 2025-03-19 RX ADMIN — TIOTROPIUM BROMIDE AND OLODATEROL 2 PUFF: 3.124; 2.736 SPRAY, METERED RESPIRATORY (INHALATION) at 07:58

## 2025-03-19 RX ADMIN — METOPROLOL TARTRATE 25 MG: 25 TABLET, FILM COATED ORAL at 20:02

## 2025-03-19 RX ADMIN — CLONIDINE HYDROCHLORIDE 0.2 MG: 0.1 TABLET ORAL at 08:13

## 2025-03-19 RX ADMIN — HEPARIN SODIUM 5000 UNITS: 5000 INJECTION INTRAVENOUS; SUBCUTANEOUS at 21:19

## 2025-03-19 RX ADMIN — CLONIDINE HYDROCHLORIDE 0.2 MG: 0.1 TABLET ORAL at 21:19

## 2025-03-19 RX ADMIN — ALPRAZOLAM 0.5 MG: 0.5 TABLET ORAL at 11:37

## 2025-03-19 RX ADMIN — SEVELAMER CARBONATE 2400 MG: 800 TABLET, FILM COATED ORAL at 08:13

## 2025-03-19 RX ADMIN — ALPRAZOLAM 0.5 MG: 0.5 TABLET ORAL at 05:20

## 2025-03-19 RX ADMIN — PANTOPRAZOLE SODIUM 40 MG: 40 TABLET, DELAYED RELEASE ORAL at 05:17

## 2025-03-19 RX ADMIN — HEPARIN SODIUM 5000 UNITS: 5000 INJECTION INTRAVENOUS; SUBCUTANEOUS at 05:17

## 2025-03-19 RX ADMIN — ASPIRIN 81 MG: 81 TABLET, COATED ORAL at 08:13

## 2025-03-19 RX ADMIN — SENNOSIDES, DOCUSATE SODIUM 2 TABLET: 50; 8.6 TABLET, FILM COATED ORAL at 08:13

## 2025-03-19 RX ADMIN — SEVELAMER CARBONATE 2400 MG: 800 TABLET, FILM COATED ORAL at 11:34

## 2025-03-19 RX ADMIN — AMLODIPINE BESYLATE 5 MG: 5 TABLET ORAL at 08:13

## 2025-03-19 RX ADMIN — ALPRAZOLAM 0.5 MG: 0.5 TABLET ORAL at 21:19

## 2025-03-19 RX ADMIN — SEVELAMER CARBONATE 2400 MG: 800 TABLET, FILM COATED ORAL at 20:01

## 2025-03-19 RX ADMIN — METOPROLOL TARTRATE 25 MG: 25 TABLET, FILM COATED ORAL at 08:13

## 2025-03-19 RX ADMIN — MORPHINE SULFATE 1 MG: 2 INJECTION, SOLUTION INTRAMUSCULAR; INTRAVENOUS at 20:02

## 2025-03-19 RX ADMIN — CLONIDINE HYDROCHLORIDE 0.2 MG: 0.1 TABLET ORAL at 14:54

## 2025-03-19 RX ADMIN — HEPARIN SODIUM 5000 UNITS: 5000 INJECTION INTRAVENOUS; SUBCUTANEOUS at 14:54

## 2025-03-19 RX ADMIN — PANTOPRAZOLE SODIUM 40 MG: 40 TABLET, DELAYED RELEASE ORAL at 14:54

## 2025-03-19 ASSESSMENT — ENCOUNTER SYMPTOMS
ABDOMINAL PAIN: 0
FACIAL SWELLING: 0
DIARRHEA: 0
CHEST TIGHTNESS: 0
NAUSEA: 0
BLOOD IN STOOL: 0
EYE DISCHARGE: 0
PHOTOPHOBIA: 0
COUGH: 0
CONSTIPATION: 0
EYE REDNESS: 0
VOMITING: 0
ABDOMINAL DISTENTION: 0
SHORTNESS OF BREATH: 1

## 2025-03-19 ASSESSMENT — PAIN SCALES - GENERAL
PAINLEVEL_OUTOF10: 8
PAINLEVEL_OUTOF10: 8

## 2025-03-19 ASSESSMENT — PAIN DESCRIPTION - LOCATION
LOCATION: CHEST
LOCATION: CHEST

## 2025-03-19 NOTE — PROGRESS NOTES
[facility] - Inpatient Rehabilitation Department   Phone: [phone]    Occupational Therapy    [x] Initial Evaluation            [] Daily Treatment Note         [] Discharge Summary      Patient: Kristine Ramirez   : 1975   MRN: [id number]   Date of Service:  3/19/2025    Admitting Diagnosis:  Pericardial effusion  Current Admission Summary: 49 y.o. female with pmh of HTN, HLD, COPD, ESRD on hemodialysis,  who presents with chest pain and fall.  Patient reports she was getting out of her bed and when she sat up she slid out of bed landing on her buttocks.  She was too weak to get up.  Her  was at home but was unable to get her up.  EMS Was called but she reports being on the ground for 6 hours.  She denies any fevers or chills.  She did have some nausea and vomiting yesterday.  She denies any diarrhea.  She had 5/10 chest pain yesterday, CP in ER has resolved.    She also has lumbar back pain after fall, no radicular symptoms.   Past Medical History:  has a past medical history of Acute respiratory failure due to COVID-19 ([facility]), Arterial ischemic stroke, ICA, left, acute ([facility]), Blind in both eyes, Cerebral artery occlusion with cerebral infarction ([facility]), CHF (congestive heart failure) ([facility]), Chronic kidney disease, COPD (chronic obstructive pulmonary disease) ([facility]), Depression, Diabetes mellitus out of control, Diabetes mellitus, type II ([facility]), Diabetic neuropathy associated with type 2 diabetes mellitus ([facility]), Generalized headaches, Hypertension, Infertility, Insomnia, Migraine headache, Mixed hyperlipidemia, Otitis media, Pelvic abscess in female, Pneumonia, Stroke ([facility]), and Stroke ([facility]).  Past Surgical History:  has a past surgical history that includes Cervix surgery; eye surgery; Foot surgery (Right); Foot surgery (Bilateral); Foot surgery (Left); IR TUNNELED CVC PLACE WO SQ PORT/PUMP > 5 YEARS (2021); Dialysis fistula creation (Right, 2/10/2022); Upper gastrointestinal  Right  Current Employment: unemployed  Hobbies: listen to videos on phone   Recent Falls: currently fell two times preceding admission-- fell 6 times the day of hospital admission on 3/5 (pt states kept sliding off bed)      Available Assistance at Discharge: 24 hr physical assistance available    Examination   Vision:   Vision Gross Assessment: Legally blind, pt reports complete blindness in L eye and \"distorted\" vision in R eye-- patient notes that she can see shadows   Hearing:   WFL  Sensation:   reports numbness and tingling in (R) UE, (B) LE  ROM:   (B) UE AROM WFL  Strength:   (B) UE strength grossly WFL    Therapist Clinical Decision Making (Complexity): low complexity  Clinical Presentation: stable      Subjective  General: Pt supine in bed upon arrival, agreeable to evaluation  Pain: 6/10.  Location: L chest/heart  Pain Interventions: RN notified        Activities of Daily Living  Basic Activities of Daily Living  Grooming: stand by assistance  Grooming Comments: seated EOB for oral hygiene, uses mouthwash in stance at sink, hand hygiene  Toileting: contact guard assistance.    Instrumental Activities of Daily Living  No IADL completed on this date.    Functional Mobility  Bed Mobility:  Supine to Sit: stand by assistance  Scooting: stand by assistance  Comments:  Transfers:  Sit to stand transfer:contact guard assistance  Stand to sit transfer: contact guard assistance  Comments:  Functional Mobility  Functional Mobility Activity: to/from bathroom, short distance in room, hallway ambulation  Device Use: rolling walker  Required Assistance: stand by assistance (with RW), initially HHA/min A for mobility with no device  Balance:  Static Sitting Balance: good: independent with functional balance in unsupported position  Dynamic Sitting Balance: fair (+): maintains balance at SBA/supervision without use of UE support  Static Standing Balance: fair (-): maintains balance at CGA with use of UE support  Dynamic

## 2025-03-19 NOTE — PROGRESS NOTES
[facility]    Hospitalist Progress Note:      Name:  Kristine Ramirez /Age/Sex: 1975  (49 y.o. female)   MRN & CSN:  [id number] & [id number] Encounter Date: 3/19/2025    Location:  [facility]2907/2907[facility]01 PCP: Damon Mireles MD     Attending:Graham Raza MD  Admission Date: 3/17/2025      Hospital Day: 3    Chief Complain/Reason for Admission:  Chief Complaint   Patient presents with    Fall     Patient arrives from home via [facility] with complaints of fall. Patient reports that she slid out of bed twice today and hit her head. Patient reports being SOB. Hx of kidney failure and stroke.        Assessment:  Kristine Ramirez is a 49 y.o. female with pmh of HTN, HLD, COPD, ESRD on hemodialysis,  who presents with chest pain and fall.     Chest pain, atypical unclear etiology: Elevated troponin  Pericardial effusion: s/p pericardiocentesis with removal of 1 L fluid  Volume overload: improving status  Chronic systolic CHF  ESRD: On hemodialysis  Hypertension  History of CVA    Plan:   F/u pericardial fluid cultures  Continue aggressive fluid removal and hemodialysis, nephrology team noted  F/u cardiology team  Continue supportive care  Current meds reviewed, adjusted.   See orders  Diet ADULT DIET; Regular; Low Fat/Low Chol/High Fiber/2 gm Na   DVT Prophylaxis [] Lovenox, [x]  Heparin, [] SCDs, [] Ambulation,  [] Eliquis, [] Xarelto  [] Coumadin   Code Status Full Code   Disposition Expected Disposition: Home vs ECF vs  with [facility]  Estimated Date of Discharge:  1-2 days  Reason for continued admission: Pericardial effusion, volume overload     Subjective:  Pt. seen and examined at bedside.    Overall symptoms are resolving.  States feeling mild pain in chest intermittently.  Denies any Cough/SOB/Abd pain/ N/V/Diarrhea.     Objective:  Vital Signs:  /68   Pulse 84   Temp 97.6 °F (36.4 °C) (Temporal)   Resp 11   Ht 1.702 m (5' 7\")   Wt 68.1 kg (150 lb 2.1 oz)

## 2025-03-19 NOTE — PROGRESS NOTES
Treatment time: 3 hours  Net UF: 0 ml     Pre weight: 68.1 kg  Post weight:68.1 kg     Access used: SOCORRO AVF    Access function: well tolerated with -400 ml/min     Medications or blood products given: na     Regular outpatient schedule: MWF     Summary of response to treatment: Patient tolerated treatment well and without any complications. Patient remained stable throughout entire treatment and upon the exiting the dialysis suite via transport.     Copy of dialysis treatment record placed in chart, to be scanned into EMR.

## 2025-03-19 NOTE — PROGRESS NOTES
NEURO: A&O x4     CARDIAC: NSR- converted from Afib prior to this shift. Given PO meds this evening. Pericardial drain in place- minimal drainage this shift.     RESP: 97% on 1L O2. (Does not wear O2 @ home)     GI: Active BS- refusing stool softner     SKIN: Scattered wounds- wound documented on Left second digit ( pt states it is not new) Wound care consulted     : Pt is HD patient- minimal urine output, have attempted to use bedpan with patient this shift.      LINES: PIV LFA. Fistula RUE     GTTS: NA    Pt on bedrest this evening with pericardial drain in place. Minimal output noted. All questions answered. Pt bathed and gown changed. Hard/ firmness noted to R breast on assessment. Pt states MD is aware and this is also not new.         See flowsheets for details, VS, MAR for meds and titration.

## 2025-03-19 NOTE — CARE COORDINATION
03/19/25 1158   Readmission Assessment   Number of Days since last admission? 8-30 days   Previous Disposition Home with Family   Who is being Interviewed Patient   What was the patient's/caregiver's perception as to why they think they needed to return back to the hospital? Other (Comment)  (fell at home and hit head)   Did you visit your Primary Care Physician after you left the hospital, before you returned this time? No   Why weren't you able to visit your PCP? Other (Comment)  (states uses medicaid transportation and only gets so many and has appt for April 3rd scheduled)   Did you see a specialist, such as Cardiac, Pulmonary, Orthopedic Physician, etc. after you left the hospital? No   Who advised the patient to return to the hospital? Self-referral   Does the patient report anything that got in the way of taking their medications? No   In our efforts to provide the best possible care to you and others like you, can you think of anything that we could have done to help you after you left the hospital the first time, so that you might not have needed to return so soon? Other (Comment)  (pt states she has no complaints about discharge instructions)

## 2025-03-19 NOTE — PROGRESS NOTES
Update given to  regarding patient POC    Electronically signed by Hallie Jones RN on 3/18/2025 at 11:05 PM

## 2025-03-19 NOTE — PROGRESS NOTES
[facility] Note    Patient Active Problem List   Diagnosis    Type 2 diabetes mellitus with hyperlipidemia (HCC)    Mixed hyperlipidemia    Migraine headache    Anemia    Diabetic foot infection (HCC)    Pyogenic inflammation of bone (HCC)    History of medication noncompliance    Osteomyelitis of left foot (HCC)    Nephrotic syndrome    Peripheral edema    Pulmonary edema    Right sided numbness    Tobacco dependence    Chronic kidney disease (CKD), stage III (moderate) (HCC)    H/O: CVA (cerebrovascular accident)    HTN (hypertension), benign    DM (diabetes mellitus), secondary, uncontrolled, 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, associated with type 2 diabetes mellitus (HCC)    Cellulitis of right foot    Hidradenitis suppurativa    Hypocalcemia    Non-toxic multinodular goiter    Polyneuropathy due to type 2 diabetes mellitus (HCC)    Proliferative diabetic retinopathy associated with type 2 diabetes mellitus (HCC)    Epiglottitis    Recurrent falls    Hypotension    Leukocytosis    Volume overload    Hemodialysis catheter dysfunction    Hypoproteinemia    GABRIELA (obstructive sleep apnea)    Type 2 diabetes mellitus with obesity (HCC)    Wheelchair dependence    Hyperkalemia    Suspected COVID-19 virus infection    Dependent on walker for ambulation    Medication management contract agreement - signed on 5/18/2023    Controlled drug dependence (HCC)    Generalized anxiety disorder with panic attacks    Coagulation defect    Chronic obstructive pulmonary disease, unspecified    Abdominal distention    Encounter for assessment of ascites    Other ascites    Physical deconditioning    AMS (altered mental status)    Primary hypertension    Cardiomyopathy, unspecified type (HCC)     Pneumonia, bacterial    Pericardial effusion       Past Medical History:   has a past medical history of Acute respiratory failure due to COVID-19 ([facility]), Arterial ischemic stroke, ICA, left, acute ([facility]), Blind in both eyes, Cerebral artery occlusion with cerebral infarction ([facility]), CHF (congestive heart failure) ([facility]), Chronic kidney disease, COPD (chronic obstructive pulmonary disease) ([facility]), Depression, Diabetes mellitus out of control, Diabetes mellitus, type II ([facility]), Diabetic neuropathy associated with type 2 diabetes mellitus ([facility]), Generalized headaches, Hypertension, Infertility, Insomnia, Migraine headache, Mixed hyperlipidemia, Otitis media, Pelvic abscess in female, Pneumonia, Stroke ([facility]), and Stroke ([facility]).    Past Social History:   reports that she has been smoking cigarettes. She has a 15 pack-year smoking history. She has never used smokeless tobacco. She reports that she does not drink alcohol and does not use drugs.    Subjective:    Tolerated HD wel yesterday, 3L fluid removal  Transferred to CVU  SOB better    Review of Systems   Constitutional:  Positive for fatigue. Negative for activity change, appetite change, chills, fever and unexpected weight change.   HENT:  Negative for congestion and facial swelling.    Eyes:  Negative for photophobia, discharge and redness.   Respiratory:  Positive for shortness of breath. Negative for cough and chest tightness.    Cardiovascular:  Negative for chest pain, palpitations and leg swelling.   Gastrointestinal:  Negative for abdominal distention, abdominal pain, blood in stool, constipation, diarrhea, nausea and vomiting.   Endocrine: Negative for cold intolerance, heat intolerance and polyuria.   Genitourinary:  Negative for decreased urine volume, difficulty urinating, flank pain and hematuria.   Musculoskeletal:  Negative for joint swelling and neck pain.   Neurological:  Negative for dizziness, seizures, syncope, speech difficulty, light-headedness and

## 2025-03-19 NOTE — CARE COORDINATION
Case Management Assessment  Initial Evaluation    Date/Time of Evaluation: 3/19/2025 11:58 AM   Assessment Completed by: UZAIR GIVENS RN    If patient is discharged prior to next notation, then this note serves as note for discharge by case management.    Patient Name: Kristine Ramirez                   YOB: 1975  Diagnosis: Pericardial effusion [I31.39]  Generalized weakness [R53.1]  ESRD (end stage renal disease) on dialysis (HCC) [N18.6, Z99.2]  Multiple falls [R29.6]  Acute respiratory failure with hypoxia and hypercapnia (HCC) [J96.01, J96.02]  Acute pericarditis, unspecified type [I30.9]                   Date / Time: 3/17/2025 12:23 AM    Patient Admission Status: Inpatient   Readmission Risk (Low < 19, Mod (19-27), High > 27): Readmission Risk Score: 22.8    Current PCP: Damon Mireles MD  PCP verified by CM? Yes    Chart Reviewed: Yes      History Provided by: Patient  Patient Orientation: Alert and Oriented    Patient Cognition: Alert    Hospitalization in the last 30 days (Readmission):  Yes    If yes, Readmission Assessment in CM Navigator will be completed.    Advance Directives:      Code Status: Full Code   Patient's Primary Decision Maker is: Legal Next of Kin    Primary Decision Maker: Kannan Ramirez - Spouse - [phone]    Discharge Planning:    Patient lives with: Spouse/Significant Other Type of Home: Other (Comment) (Condo)  Primary Care Giver: Self  Patient Support Systems include: Spouse/Significant Other   Current Financial resources: Medicare, Medicaid  Current community resources: Transportation, Other (Comment) (through her medicaid/ HD pt)  Current services prior to admission: Transportation, Other (Comment) (through her medicaid/ outpt HD)            Current DME:              Type of Home Care services:  None    ADLS  Prior functional level: Assistance with the following:, Bathing, Cooking, Shopping, Housework, Other (see comment) (transportation)  Current  functional level: Other (see comment), Assistance with the following:, Bathing, Housework, Cooking, Shopping (transportation)    PT AM-PAC:   /24  OT AM-PAC:   /24    Family can provide assistance at DC: Yes  Would you like Case Management to discuss the discharge plan with any other family members/significant others, and if so, who? No  Plans to Return to Present Housing: Yes  Other Identified Issues/Barriers to RETURNING to current housing: none   Potential Assistance needed at discharge: Transportation, Other (Comment) (needs lyft home)            Potential DME:    Patient expects to discharge to: House  Plan for transportation at discharge:      Financial    [facility] / Plan: [facility] / Product Type: *No Product type* /     Does insurance require precert for SNF: Yes    Potential assistance Purchasing Medications: No  Meds-to-Beds request: Yes      [id number] [place], [place] - [address] - P [phone] - F [phone]  [facility] [address]  [facility] [address]  Phone: [phone] Fax: [phone]    [facility] [id number] [place] [address] [address] [phone] - F [phone]  [address]  [facility] [address]  Phone: [phone] Fax: [phone]      Notes:    Factors facilitating achievement of predicted outcomes: Family support, Cooperative, and Pleasant    Barriers to discharge: None identified at this time.     Additional Case Management Notes:     Pt from home with spouse who assists her as needed d/t pt is partially blind.     Pt uses her medicaid transport for HD.    Will need [facility] lyft at dc d/t they do not have a car and hard to get wheelchair transport through insurance d/t pt usually has to pay.    Pt HD pt at :     [address] [address]  Phone: [phone]   Fax: [phone]  n/a no therapy ordered at this time, not anticipated     States schedule is : mon/wed/fri at 10:15 AM and

## 2025-03-19 NOTE — PROGRESS NOTES
[facility] - Inpatient Rehabilitation Department   Phone: [phone]    Physical Therapy    [x] Initial Evaluation            [] Daily Treatment Note         [] Discharge Summary      Patient: Kristine Ramirez   : 1975   MRN: [id number]   Date of Service:  3/19/2025  Admitting Diagnosis: Pericardial effusion  Current Admission Summary: Kristine Ramirez is a 49 y.o. female with pmh of HTN, HLD, COPD, ESRD on hemodialysis,  who presents with chest pain and fall.  Patient reports she was getting out of her bed and when she sat up she slid out of bed landing on her buttocks.  She was too weak to get up.  Her  was at home but was unable to get her up.  EMS Was called but she reports being on the ground for 6 hours.  She denies any fevers or chills.  She did have some nausea and vomiting yesterday.  She denies any diarrhea.  She had 5/10 chest pain yesterday, CP in ER has resolved.    She also has lumbar back pain after fall, no radicular symptoms.      Past Medical History:  has a past medical history of Acute respiratory failure due to COVID-19 ([facility]), Arterial ischemic stroke, ICA, left, acute ([facility]), Blind in both eyes, Cerebral artery occlusion with cerebral infarction ([facility]), CHF (congestive heart failure) ([facility]), Chronic kidney disease, COPD (chronic obstructive pulmonary disease) ([facility]), Depression, Diabetes mellitus out of control, Diabetes mellitus, type II ([facility]), Diabetic neuropathy associated with type 2 diabetes mellitus ([facility]), Generalized headaches, Hypertension, Infertility, Insomnia, Migraine headache, Mixed hyperlipidemia, Otitis media, Pelvic abscess in female, Pneumonia, Stroke ([facility]), and Stroke ([facility]).  Past Surgical History:  has a past surgical history that includes Cervix surgery; eye surgery; Foot surgery (Right); Foot surgery (Bilateral); Foot surgery (Left); IR TUNNELED CVC PLACE WO SQ PORT/PUMP > 5 YEARS (2021); Dialysis fistula creation (Right, 2/10/2022);

## 2025-03-19 NOTE — PROGRESS NOTES
While bathing patient, a wound noted on left foot on second digit. This RN measured it and spoke to patient. Patient stated her PCP is aware and follows for it.   Measured  approx 0.8cmx 0.8cm  Wound care has been consulted for patient prior on this admission    Electronically signed by Hallie Jones RN on 3/18/2025 at 10:50 PM

## 2025-03-20 ENCOUNTER — APPOINTMENT (OUTPATIENT)
Dept: CT IMAGING | Age: 50
End: 2025-03-20
Payer: COMMERCIAL

## 2025-03-20 ENCOUNTER — APPOINTMENT (OUTPATIENT)
Age: 50
End: 2025-03-20
Attending: INTERNAL MEDICINE
Payer: COMMERCIAL

## 2025-03-20 ENCOUNTER — APPOINTMENT (OUTPATIENT)
Dept: GENERAL RADIOLOGY | Age: 50
End: 2025-03-20
Payer: COMMERCIAL

## 2025-03-20 PROBLEM — Z99.2 ESRD ON DIALYSIS (HCC): Status: ACTIVE | Noted: 2023-04-26

## 2025-03-20 PROBLEM — N18.30 CHRONIC KIDNEY DISEASE (CKD), STAGE III (MODERATE) (HCC): Status: RESOLVED | Noted: 2020-08-27 | Resolved: 2025-03-20

## 2025-03-20 LAB
ALBUMIN SERPL-MCNC: 3.2 G/DL (ref 3.4–5)
ANA SER QL IA: NEGATIVE
ANION GAP SERPL CALCULATED.3IONS-SCNC: 11 MMOL/L (ref 3–16)
BUN SERPL-MCNC: 20 MG/DL (ref 7–20)
CALCIUM SERPL-MCNC: 8.5 MG/DL (ref 8.3–10.6)
CCP IGG SERPL-ACNC: <0.5 U/ML (ref 0–2.9)
CHLORIDE SERPL-SCNC: 99 MMOL/L (ref 99–110)
CO2 SERPL-SCNC: 26 MMOL/L (ref 21–32)
CREAT SERPL-MCNC: 3.2 MG/DL (ref 0.6–1.1)
CRP SERPL-MCNC: 81.7 MG/L (ref 0–5.1)
ECHO AO ASC DIAM: 3 CM
ECHO AO ASCENDING AORTA INDEX: 1.66 CM/M2
ECHO AO ROOT DIAM: 2.5 CM
ECHO AO ROOT INDEX: 1.38 CM/M2
ECHO AV AREA PEAK VELOCITY: 2 CM2
ECHO AV AREA VTI: 1.9 CM2
ECHO AV AREA/BSA PEAK VELOCITY: 1.1 CM2/M2
ECHO AV AREA/BSA VTI: 1 CM2/M2
ECHO AV MEAN GRADIENT: 4 MMHG
ECHO AV MEAN VELOCITY: 1 M/S
ECHO AV PEAK GRADIENT: 7 MMHG
ECHO AV PEAK VELOCITY: 1.3 M/S
ECHO AV VELOCITY RATIO: 0.69
ECHO AV VTI: 29.3 CM
ECHO BSA: 1.82 M2
ECHO BSA: 1.82 M2
ECHO IVC INSP: 1.2 CM
ECHO IVC PROX: 1.7 CM
ECHO LA DIAMETER INDEX: 2.49 CM/M2
ECHO LA DIAMETER: 4.5 CM
ECHO LA TO AORTIC ROOT RATIO: 1.8
ECHO LV EDV 3D: 152 ML
ECHO LV EDV A2C: 137 ML
ECHO LV EDV A2C: 142 ML
ECHO LV EDV A4C: 123 ML
ECHO LV EDV A4C: 123 ML
ECHO LV EDV INDEX 3D: 84 ML/M2
ECHO LV EDV INDEX A4C: 68 ML/M2
ECHO LV EDV INDEX A4C: 68 ML/M2
ECHO LV EDV NDEX A2C: 76 ML/M2
ECHO LV EDV NDEX A2C: 78 ML/M2
ECHO LV EF PHYSICIAN: 55 %
ECHO LV EJECTION FRACTION 3D: 45 %
ECHO LV EJECTION FRACTION A2C: 52 %
ECHO LV EJECTION FRACTION A2C: 76 %
ECHO LV EJECTION FRACTION A4C: 54 %
ECHO LV EJECTION FRACTION A4C: 65 %
ECHO LV EJECTION FRACTION BIPLANE: 52 % (ref 55–100)
ECHO LV EJECTION FRACTION BIPLANE: 71 % (ref 55–100)
ECHO LV ESV 3D: 84 ML
ECHO LV ESV A2C: 33 ML
ECHO LV ESV A2C: 68 ML
ECHO LV ESV A4C: 43 ML
ECHO LV ESV A4C: 56 ML
ECHO LV ESV INDEX 3D: 46 ML/M2
ECHO LV ESV INDEX A2C: 18 ML/M2
ECHO LV ESV INDEX A2C: 38 ML/M2
ECHO LV ESV INDEX A4C: 24 ML/M2
ECHO LV ESV INDEX A4C: 31 ML/M2
ECHO LV FRACTIONAL SHORTENING: 24 % (ref 28–44)
ECHO LV FRACTIONAL SHORTENING: 27 % (ref 28–44)
ECHO LV INTERNAL DIMENSION DIASTOLE INDEX: 2.54 CM/M2
ECHO LV INTERNAL DIMENSION DIASTOLE INDEX: 2.71 CM/M2
ECHO LV INTERNAL DIMENSION DIASTOLIC: 4.6 CM (ref 3.9–5.3)
ECHO LV INTERNAL DIMENSION DIASTOLIC: 4.9 CM (ref 3.9–5.3)
ECHO LV INTERNAL DIMENSION SYSTOLIC INDEX: 1.93 CM/M2
ECHO LV INTERNAL DIMENSION SYSTOLIC INDEX: 1.99 CM/M2
ECHO LV INTERNAL DIMENSION SYSTOLIC: 3.5 CM
ECHO LV INTERNAL DIMENSION SYSTOLIC: 3.6 CM
ECHO LV IVSD: 1 CM (ref 0.6–0.9)
ECHO LV IVSD: 1.1 CM (ref 0.6–0.9)
ECHO LV MASS 2D: 176 G (ref 67–162)
ECHO LV MASS 2D: 217.4 G (ref 67–162)
ECHO LV MASS 3D INDEX: 91.2 G/M2
ECHO LV MASS 3D: 165 G
ECHO LV MASS INDEX 2D: 120.1 G/M2 (ref 43–95)
ECHO LV MASS INDEX 2D: 97.3 G/M2 (ref 43–95)
ECHO LV POSTERIOR WALL DIASTOLIC: 1 CM (ref 0.6–0.9)
ECHO LV POSTERIOR WALL DIASTOLIC: 1.4 CM (ref 0.6–0.9)
ECHO LV RELATIVE WALL THICKNESS RATIO: 0.41
ECHO LV RELATIVE WALL THICKNESS RATIO: 0.61
ECHO LVOT AREA: 2.3 CM2
ECHO LVOT AREA: 2.8 CM2
ECHO LVOT AV VTI INDEX: 0.68
ECHO LVOT DIAM: 1.7 CM
ECHO LVOT DIAM: 1.9 CM
ECHO LVOT MEAN GRADIENT: 2 MMHG
ECHO LVOT PEAK GRADIENT: 3 MMHG
ECHO LVOT PEAK VELOCITY: 0.9 M/S
ECHO LVOT STROKE VOLUME INDEX: 31.2 ML/M2
ECHO LVOT SV: 56.4 ML
ECHO LVOT VTI: 19.9 CM
ECHO RV BASAL DIMENSION: 4.6 CM
ECHO RV BASAL DIMENSION: 4.6 CM
ECHO RV FRACTIONAL AREA CHANGE: 17 %
ECHO RV FREE WALL PEAK S': 5 CM/S
ECHO RV FREE WALL PEAK S': 8.5 CM/S
ECHO RV LONGITUDINAL DIMENSION: 8.3 CM
ECHO RV LONGITUDINAL DIMENSION: 8.8 CM
ECHO RV MID DIMENSION: 3 CM
ECHO RV MID DIMENSION: 3.3 CM
ECHO RV TAPSE: 0.8 CM (ref 1.7–?)
ECHO RV TAPSE: 0.9 CM (ref 1.7–?)
ECHO TV REGURGITANT MAX VELOCITY: 2.61 M/S
ECHO TV REGURGITANT PEAK GRADIENT: 27 MMHG
ERYTHROCYTE [SEDIMENTATION RATE] IN BLOOD BY WESTERGREN METHOD: 39 MM/HR (ref 0–20)
GFR SERPLBLD CREATININE-BSD FMLA CKD-EPI: 17 ML/MIN/{1.73_M2}
GGT SERPL-CCNC: 62 U/L (ref 5–36)
GLUCOSE BLD-MCNC: 125 MG/DL (ref 70–99)
GLUCOSE SERPL-MCNC: 219 MG/DL (ref 70–99)
HIV 1+2 AB+HIV1 P24 AG SERPL QL IA: NORMAL
HIV 2 AB SERPL QL IA: NORMAL
HIV1 AB SERPL QL IA: NORMAL
HIV1 P24 AG SERPL QL IA: NORMAL
PERFORMED ON: ABNORMAL
PHOSPHATE SERPL-MCNC: 2.5 MG/DL (ref 2.5–4.9)
POTASSIUM SERPL-SCNC: 4.2 MMOL/L (ref 3.5–5.1)
SODIUM SERPL-SCNC: 136 MMOL/L (ref 136–145)

## 2025-03-20 PROCEDURE — 6370000000 HC RX 637 (ALT 250 FOR IP): Performed by: NURSE PRACTITIONER

## 2025-03-20 PROCEDURE — 6370000000 HC RX 637 (ALT 250 FOR IP): Performed by: HOSPITALIST

## 2025-03-20 PROCEDURE — 97110 THERAPEUTIC EXERCISES: CPT

## 2025-03-20 PROCEDURE — 86038 ANTINUCLEAR ANTIBODIES: CPT

## 2025-03-20 PROCEDURE — 6360000004 HC RX CONTRAST MEDICATION: Performed by: INTERNAL MEDICINE

## 2025-03-20 PROCEDURE — 94640 AIRWAY INHALATION TREATMENT: CPT

## 2025-03-20 PROCEDURE — 86701 HIV-1ANTIBODY: CPT

## 2025-03-20 PROCEDURE — 99232 SBSQ HOSP IP/OBS MODERATE 35: CPT | Performed by: INTERNAL MEDICINE

## 2025-03-20 PROCEDURE — 85652 RBC SED RATE AUTOMATED: CPT

## 2025-03-20 PROCEDURE — 2500000003 HC RX 250 WO HCPCS: Performed by: NURSE PRACTITIONER

## 2025-03-20 PROCEDURE — 82977 ASSAY OF GGT: CPT

## 2025-03-20 PROCEDURE — 76376 3D RENDER W/INTRP POSTPROCES: CPT | Performed by: INTERNAL MEDICINE

## 2025-03-20 PROCEDURE — 93308 TTE F-UP OR LMTD: CPT | Performed by: INTERNAL MEDICINE

## 2025-03-20 PROCEDURE — 86702 HIV-2 ANTIBODY: CPT

## 2025-03-20 PROCEDURE — 93325 DOPPLER ECHO COLOR FLOW MAPG: CPT | Performed by: INTERNAL MEDICINE

## 2025-03-20 PROCEDURE — 86140 C-REACTIVE PROTEIN: CPT

## 2025-03-20 PROCEDURE — 6360000002 HC RX W HCPCS: Performed by: NURSE PRACTITIONER

## 2025-03-20 PROCEDURE — 6360000002 HC RX W HCPCS: Performed by: STUDENT IN AN ORGANIZED HEALTH CARE EDUCATION/TRAINING PROGRAM

## 2025-03-20 PROCEDURE — 86480 TB TEST CELL IMMUN MEASURE: CPT

## 2025-03-20 PROCEDURE — 87390 HIV-1 AG IA: CPT

## 2025-03-20 PROCEDURE — 94761 N-INVAS EAR/PLS OXIMETRY MLT: CPT

## 2025-03-20 PROCEDURE — 71045 X-RAY EXAM CHEST 1 VIEW: CPT

## 2025-03-20 PROCEDURE — 97530 THERAPEUTIC ACTIVITIES: CPT

## 2025-03-20 PROCEDURE — 93321 DOPPLER ECHO F-UP/LMTD STD: CPT

## 2025-03-20 PROCEDURE — 93321 DOPPLER ECHO F-UP/LMTD STD: CPT | Performed by: INTERNAL MEDICINE

## 2025-03-20 PROCEDURE — 80069 RENAL FUNCTION PANEL: CPT

## 2025-03-20 PROCEDURE — 86200 CCP ANTIBODY: CPT

## 2025-03-20 PROCEDURE — 2500000003 HC RX 250 WO HCPCS: Performed by: INTERNAL MEDICINE

## 2025-03-20 PROCEDURE — 74177 CT ABD & PELVIS W/CONTRAST: CPT

## 2025-03-20 PROCEDURE — 76376 3D RENDER W/INTRP POSTPROCES: CPT

## 2025-03-20 PROCEDURE — 2000000000 HC ICU R&B

## 2025-03-20 RX ORDER — IOPAMIDOL 755 MG/ML
75 INJECTION, SOLUTION INTRAVASCULAR
Status: COMPLETED | OUTPATIENT
Start: 2025-03-20 | End: 2025-03-20

## 2025-03-20 RX ORDER — DEXTROSE MONOHYDRATE 100 MG/ML
INJECTION, SOLUTION INTRAVENOUS CONTINUOUS PRN
Status: DISCONTINUED | OUTPATIENT
Start: 2025-03-20 | End: 2025-03-21 | Stop reason: HOSPADM

## 2025-03-20 RX ORDER — HYDROMORPHONE HYDROCHLORIDE 1 MG/ML
0.5 INJECTION, SOLUTION INTRAMUSCULAR; INTRAVENOUS; SUBCUTANEOUS
Status: DISCONTINUED | OUTPATIENT
Start: 2025-03-20 | End: 2025-03-21 | Stop reason: HOSPADM

## 2025-03-20 RX ORDER — GLUCAGON 1 MG/ML
1 KIT INJECTION PRN
Status: DISCONTINUED | OUTPATIENT
Start: 2025-03-20 | End: 2025-03-21 | Stop reason: HOSPADM

## 2025-03-20 RX ORDER — INSULIN LISPRO 100 [IU]/ML
0-8 INJECTION, SOLUTION INTRAVENOUS; SUBCUTANEOUS
Status: DISCONTINUED | OUTPATIENT
Start: 2025-03-20 | End: 2025-03-21 | Stop reason: HOSPADM

## 2025-03-20 RX ORDER — OXYCODONE AND ACETAMINOPHEN 5; 325 MG/1; MG/1
1 TABLET ORAL
Refills: 0 | Status: DISCONTINUED | OUTPATIENT
Start: 2025-03-20 | End: 2025-03-21 | Stop reason: HOSPADM

## 2025-03-20 RX ORDER — DIATRIZOATE MEGLUMINE AND DIATRIZOATE SODIUM 660; 100 MG/ML; MG/ML
20 SOLUTION ORAL; RECTAL
Status: DISCONTINUED | OUTPATIENT
Start: 2025-03-20 | End: 2025-03-21 | Stop reason: HOSPADM

## 2025-03-20 RX ADMIN — HYDROMORPHONE HYDROCHLORIDE 0.5 MG: 1 INJECTION, SOLUTION INTRAMUSCULAR; INTRAVENOUS; SUBCUTANEOUS at 15:31

## 2025-03-20 RX ADMIN — SULFUR HEXAFLUORIDE 2 ML: 60.7; .19; .19 INJECTION, POWDER, LYOPHILIZED, FOR SUSPENSION INTRAVENOUS; INTRAVESICAL at 15:55

## 2025-03-20 RX ADMIN — SEVELAMER CARBONATE 2400 MG: 800 TABLET, FILM COATED ORAL at 08:41

## 2025-03-20 RX ADMIN — METOPROLOL TARTRATE 25 MG: 25 TABLET, FILM COATED ORAL at 21:05

## 2025-03-20 RX ADMIN — IOPAMIDOL 75 ML: 755 INJECTION, SOLUTION INTRAVENOUS at 18:53

## 2025-03-20 RX ADMIN — SULFUR HEXAFLUORIDE 2 ML: 60.7; .19; .19 INJECTION, POWDER, LYOPHILIZED, FOR SUSPENSION INTRAVENOUS; INTRAVESICAL at 08:06

## 2025-03-20 RX ADMIN — CLONIDINE HYDROCHLORIDE 0.2 MG: 0.1 TABLET ORAL at 21:09

## 2025-03-20 RX ADMIN — PANTOPRAZOLE SODIUM 40 MG: 40 TABLET, DELAYED RELEASE ORAL at 15:31

## 2025-03-20 RX ADMIN — TIOTROPIUM BROMIDE AND OLODATEROL 2 PUFF: 3.124; 2.736 SPRAY, METERED RESPIRATORY (INHALATION) at 08:16

## 2025-03-20 RX ADMIN — METOPROLOL TARTRATE 25 MG: 25 TABLET, FILM COATED ORAL at 08:41

## 2025-03-20 RX ADMIN — HEPARIN SODIUM 5000 UNITS: 5000 INJECTION INTRAVENOUS; SUBCUTANEOUS at 21:05

## 2025-03-20 RX ADMIN — PANTOPRAZOLE SODIUM 40 MG: 40 TABLET, DELAYED RELEASE ORAL at 08:41

## 2025-03-20 RX ADMIN — SODIUM CHLORIDE, PRESERVATIVE FREE 10 ML: 5 INJECTION INTRAVENOUS at 08:42

## 2025-03-20 RX ADMIN — HEPARIN SODIUM 5000 UNITS: 5000 INJECTION INTRAVENOUS; SUBCUTANEOUS at 08:50

## 2025-03-20 RX ADMIN — HYDROMORPHONE HYDROCHLORIDE 0.5 MG: 1 INJECTION, SOLUTION INTRAMUSCULAR; INTRAVENOUS; SUBCUTANEOUS at 21:04

## 2025-03-20 RX ADMIN — SENNOSIDES, DOCUSATE SODIUM 2 TABLET: 50; 8.6 TABLET, FILM COATED ORAL at 08:41

## 2025-03-20 RX ADMIN — ALPRAZOLAM 0.5 MG: 0.5 TABLET ORAL at 18:18

## 2025-03-20 RX ADMIN — SEVELAMER CARBONATE 2400 MG: 800 TABLET, FILM COATED ORAL at 12:32

## 2025-03-20 RX ADMIN — OXYCODONE HYDROCHLORIDE AND ACETAMINOPHEN 1 TABLET: 5; 325 TABLET ORAL at 01:34

## 2025-03-20 RX ADMIN — CLONIDINE HYDROCHLORIDE 0.2 MG: 0.1 TABLET ORAL at 15:31

## 2025-03-20 RX ADMIN — AMLODIPINE BESYLATE 5 MG: 5 TABLET ORAL at 08:41

## 2025-03-20 RX ADMIN — ALPRAZOLAM 0.5 MG: 0.5 TABLET ORAL at 08:42

## 2025-03-20 RX ADMIN — SEVELAMER CARBONATE 2400 MG: 800 TABLET, FILM COATED ORAL at 15:37

## 2025-03-20 RX ADMIN — ASPIRIN 81 MG: 81 TABLET, COATED ORAL at 08:41

## 2025-03-20 RX ADMIN — HEPARIN SODIUM 5000 UNITS: 5000 INJECTION INTRAVENOUS; SUBCUTANEOUS at 15:31

## 2025-03-20 RX ADMIN — HYDROMORPHONE HYDROCHLORIDE 0.5 MG: 1 INJECTION, SOLUTION INTRAMUSCULAR; INTRAVENOUS; SUBCUTANEOUS at 10:29

## 2025-03-20 RX ADMIN — CLONIDINE HYDROCHLORIDE 0.2 MG: 0.1 TABLET ORAL at 08:41

## 2025-03-20 ASSESSMENT — PAIN DESCRIPTION - ORIENTATION
ORIENTATION: LEFT
ORIENTATION: LEFT

## 2025-03-20 ASSESSMENT — ENCOUNTER SYMPTOMS
EYE DISCHARGE: 0
ABDOMINAL PAIN: 0
BLOOD IN STOOL: 0
SHORTNESS OF BREATH: 1
COUGH: 0
FACIAL SWELLING: 0
ABDOMINAL DISTENTION: 0
CONSTIPATION: 0
PHOTOPHOBIA: 0
EYE REDNESS: 0
VOMITING: 0
CHEST TIGHTNESS: 0
DIARRHEA: 0
NAUSEA: 0

## 2025-03-20 ASSESSMENT — PAIN SCALES - GENERAL
PAINLEVEL_OUTOF10: 7

## 2025-03-20 ASSESSMENT — PAIN DESCRIPTION - LOCATION
LOCATION: CHEST

## 2025-03-20 ASSESSMENT — PAIN DESCRIPTION - DESCRIPTORS
DESCRIPTORS: DISCOMFORT
DESCRIPTORS: ACHING
DESCRIPTORS: ACHING

## 2025-03-20 NOTE — PROGRESS NOTES
[facility]  Progress ntoes  [phone]      Chief Complaint   Patient presents with    Fall     Patient arrives from home via [address] [facility] with complaints of fall. Patient reports that she slid out of bed twice today and hit her head. Patient reports being SOB. Hx of kidney failure and stroke.             History of Present Illness:  Kristine Ramirez is a 49 y.o. patient who presented to the hospital with complaints of falling out of bed. I have been asked to provide consultation regarding further management and testing. The patient reports that she went blind in 2020 due a stroke and macular degeneration. She states that she has been having intermittent chest discomfort over the last few months. She reports that it is central and right sided. It worsens with a deep breath and wheeling her wheel chair. It typically lasts an hour. It radiates to her back. She denies associated palpitations, diaphoresis, or nausea. She is not currently having any chest pain. She denies recent viral illness. She states that she has not had a bowel movement in over a week. She continues to smoke. No history of DVT/PE.     Subjective: reports breathing is improved. States that she has pain when she breathes in.   Past Medical History:   has a past medical history of Acute respiratory failure due to COVID-19 ([facility]), Arterial ischemic stroke, ICA, left, acute ([facility]), Blind in both eyes, Cerebral artery occlusion with cerebral infarction ([facility]), CHF (congestive heart failure) ([facility]), Chronic kidney disease, COPD (chronic obstructive pulmonary disease) ([facility]), Depression, Diabetes mellitus out of control, Diabetes mellitus, type II ([facility]), Diabetic neuropathy associated with type 2 diabetes mellitus ([facility]), Generalized headaches, Hypertension, Infertility, Insomnia, Migraine headache, Mixed hyperlipidemia, Otitis media, Pelvic abscess in female, Pneumonia, Stroke ([facility]), and Stroke ([facility]).    Surgical History:   has a past  Graham Raza MD   25 mg at 03/19/25 2002    sennosides-docusate sodium (SENOKOT-S) 8.6-50 MG tablet 2 tablet  2 tablet Oral BID Graham Raza MD   2 tablet at 03/19/25 0813    bisacodyl (DULCOLAX) suppository 10 mg  10 mg Rectal Daily PRN Graham Raza MD            Allergies:  Amoxicillin, Atorvastatin, Levofloxacin, Levofloxacin, Vancomycin, and Tape [adhesive tape]     Review of Systems:     Constitutional: there has been no unanticipated weight loss. There's been no change in energy level, sleep pattern, or activity level.     Eyes: No visual changes or diplopia. No scleral icterus.  ENT: No Headaches, hearing loss or vertigo. No mouth sores or sore throat.  Cardiovascular: Reviewed in HPI  Respiratory: No cough or wheezing, no sputum production. No hematemesis.    Gastrointestinal: No abdominal pain, appetite loss, blood in stools. No change in bowel or bladder habits.  Genitourinary: No dysuria, trouble voiding, or hematuria.  Musculoskeletal:  No gait disturbance, weakness or joint complaints.  Integumentary: No rash or pruritis.  Neurological: No headache, diplopia, change in muscle strength, numbness or tingling. No change in gait, balance, coordination, mood, affect, memory, mentation, behavior.  Psychiatric: No anxiety, no depression.  Endocrine: No malaise, fatigue or temperature intolerance. No excessive thirst, fluid intake, or urination. No tremor.  Hematologic/Lymphatic: No abnormal bruising or bleeding, blood clots or swollen lymph nodes.  Allergic/Immunologic: No nasal congestion or hives.      Physical Examination:    Vitals:    03/19/25 1945   BP: (!) 142/89   Pulse: 91   Resp: 16   Temp: 97.7 °F (36.5 °C)   SpO2: 95%    Weight - Scale: 68.1 kg (150 lb 2.1 oz)         General Appearance:  Alert, cooperative, no distress, appears stated age   Head:  Normocephalic, without obvious abnormality, atraumatic   Eyes:  PERRL, conjunctiva/corneas clear       Nose: Nares normal, no drainage or sinus

## 2025-03-20 NOTE — PROGRESS NOTES
[facility] - Inpatient Rehabilitation Department   Phone: [phone]    Physical Therapy    [] Initial Evaluation            [x] Daily Treatment Note         [] Discharge Summary      Patient: Kristine Ramirez   : 1975   MRN: [id number]   Date of Service:  3/20/2025  Admitting Diagnosis: Pericardial effusion  Current Admission Summary: Kristine Ramirez is a 49 y.o. female with pmh of HTN, HLD, COPD, ESRD on hemodialysis,  who presents with chest pain and fall.  Patient reports she was getting out of her bed and when she sat up she slid out of bed landing on her buttocks.  She was too weak to get up.  Her  was at home but was unable to get her up.  EMS Was called but she reports being on the ground for 6 hours.  She denies any fevers or chills.  She did have some nausea and vomiting yesterday.  She denies any diarrhea.  She had 5/10 chest pain yesterday, CP in ER has resolved.    She also has lumbar back pain after fall, no radicular symptoms.      Past Medical History:  has a past medical history of Acute respiratory failure due to COVID-19 ([facility]), Arterial ischemic stroke, ICA, left, acute ([facility]), Blind in both eyes, Cerebral artery occlusion with cerebral infarction ([facility]), CHF (congestive heart failure) ([facility]), Chronic kidney disease, COPD (chronic obstructive pulmonary disease) ([facility]), Depression, Diabetes mellitus out of control, Diabetes mellitus, type II ([facility]), Diabetic neuropathy associated with type 2 diabetes mellitus ([facility]), Generalized headaches, Hypertension, Infertility, Insomnia, Migraine headache, Mixed hyperlipidemia, Otitis media, Pelvic abscess in female, Pneumonia, Stroke ([facility]), and Stroke ([facility]).  Past Surgical History:  has a past surgical history that includes Cervix surgery; eye surgery; Foot surgery (Right); Foot surgery (Bilateral); Foot surgery (Left); IR TUNNELED CVC PLACE WO SQ PORT/PUMP > 5 YEARS (2021); Dialysis fistula creation (Right, 2/10/2022);  marches, ankle pumps, modified crunches x 15 reps each while seated at EOB with SBA. Total time in sitting ~20 minutes. Increased time spent completing grooming ADLs per patient request. Patient declining all OOB mobility at this time, not receptive to education on benefits of mobility/risks of immobility. Patient is educated on progression of POC and PT plan at discharge, agreeable to plan in the future, but not agreeable to starting this date.     Functional Outcomes  AM-PAC Inpatient Mobility Raw Score : 19              Cognition  [facility]  Safety Judgement: decreased awareness of need for assistance, decreased awareness of need for safety  Sequencing: requires cues for some  Patient presents with poor safety awareness per report of numerous falls, though not changing home situation to accommodate safety.  Orientation:    alert and oriented x 4  Command Following:   [facility]    Education  Barriers To Learning: visual  Patient Education: patient educated on goals, PT role and benefits, plan of care, general safety, functional mobility training, proper use of assistive device/equipment, energy conservation, transfer training, discharge recommendations  Learning Assessment:  patient verbalizes and demonstrates understanding    Assessment  Activity Tolerance: vitals stable  Impairments Requiring Therapeutic Intervention: decreased functional mobility, decreased strength, decreased safety awareness, decreased endurance, decreased balance  Prognosis: good  Clinical Assessment: Patient is a 50 yo female presenting with (R) chest discomfort as well as recurrent falls from bed. At baseline she is independent with the rolling walker and the wheelchair for mobility, though states that she does not mobilize for far distances. Currently she requires supervision for bed mobility and will benefit from OPPT services to progress independence/balance to prevent deconditioning and reduce falls. Patient is declining all OOB mobility this date

## 2025-03-20 NOTE — CONSULTS
fluid cytology with rare mildly atypical cell cluster against the background of mixed inflammation.    -Follow-up repeat CT abdomen pelvis with IV contrast per hematology    Chronic intermittent nausea and vomiting -prior EGDs with retained food concerning for gastroparesis.  Appears she is on Trulicity which can cause slow gastric emptying.  -Avoid Trulicity if possible    Discussed with Dr. Holloway.  Dr. Nieves will see patient tomorrow.  Oumou Christian PA-C  [facility]    [facility]    I have personally performed a face to face diagnostic evaluation on this patient. I have spent more than 50% of the total clinical encounter in interviewing/examining the patient, reviewing patient chart (including but not limited to notes, labs, imaging and other testing), documentation of findings and subsequent follow up of ordered medication and testing, placing referrals and communication with patient care providers, coordinating future care, as well as documentation in the EHR. I performed a substantive portion of the medical decision making. I agree with the findings and recommended plan of care, as documented by the physician assistant/nurse practitioner.  In summary, my findings and plan are the following: As above, 49-year-old woman with multiple medical problems including end-stage renal disease on hemodialysis, admitted with acute pericarditis and pericardial effusion that has been drained.  She has a history of high protein, low SAAG ascites of unclear cause.  We are consulted for an elevated alkaline phosphatase.  Review of her case, imaging and labs is concerning for an occult malignancy involving her peritoneum.  On exam her abdomen is actually soft nondistended nontender with normal bowel sounds.  She does have bulging flanks from previous obesity.  Review of her CT demonstrates a small amount of ascites that is perihepatic with concern that it may contain some blood.  Patient states that her ascites  is much better controlled now than it was a year ago when initially diagnosed.  I suspect the elevated alkaline phosphatase and GGT are due to passive congestion of the liver or could be medication-induced as abdominal imaging demonstrates no biliary obstruction or other evidence of biliary disease.  We will send alkaline phosphatase isoenzymes to be sure it is all hepatic.  I will consult with Dr. Haney of oncology regarding the need for a laparoscopy to further evaluate the abnormality seen on CT.    Raj Nieves MD  [facility]  3/21/2025

## 2025-03-20 NOTE — FLOWSHEET NOTE
03/20/25 0830   Vitals   Temp 98.2 °F (36.8 °C)   Temp Source Temporal   Pulse 86   Heart Rate Source Monitor   Respirations 18   BP (!) 146/71   MAP (Calculated) 96   MAP (mmHg) 89   BP Location Left Arm   BP Upper/Lower Upper   BP Method Automatic   Patient Position Semi fowlers   Cardiac Rhythm Sinus rhythm   Oxygen Therapy   SpO2 95 %   Pulse Oximetry Type Intermittent   Pulse Oximeter Device Mode Intermittent   Pulse Oximeter Device Location Finger   O2 Device None (Room air)     Shift assessment complete- see complex assessment data in flowsheet. VSS. Pt alert and oriented x4. Medications adminsitered per MAR. POC discussed with pt, no further questions or concerns at this time. Bed in lowest position, call light within reach.   Electronically signed by Anat Miller RN on 3/20/2025 at 10:43 AM

## 2025-03-20 NOTE — PROGRESS NOTES
[facility] CVU Note    Patient Active Problem List   Diagnosis    Type 2 diabetes mellitus with hyperlipidemia (HCC)    Mixed hyperlipidemia    Migraine headache    Anemia    Diabetic foot infection (HCC)    Pyogenic inflammation of bone (HCC)    History of medication noncompliance    Osteomyelitis of left foot (HCC)    Nephrotic syndrome    Peripheral edema    Pulmonary edema    Right sided numbness    Tobacco dependence    H/O: CVA (cerebrovascular accident)    HTN (hypertension), benign    DM (diabetes mellitus), secondary, uncontrolled, 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, associated with type 2 diabetes mellitus (HCC)    Cellulitis of right foot    Hidradenitis suppurativa    Hypocalcemia    Non-toxic multinodular goiter    Polyneuropathy due to type 2 diabetes mellitus (HCC)    Proliferative diabetic retinopathy associated with type 2 diabetes mellitus (HCC)    Epiglottitis    Recurrent falls    Hypotension    Leukocytosis    Volume overload    Hemodialysis catheter dysfunction    Hypoproteinemia    GABRIELA (obstructive sleep apnea)    Type 2 diabetes mellitus with obesity (HCC)    Wheelchair dependence    ESRD on dialysis (HCC)    Hyperkalemia    Suspected COVID-19 virus infection    Dependent on walker for ambulation    Medication management contract agreement - signed on 5/18/2023    Controlled drug dependence (HCC)    Generalized anxiety disorder with panic attacks    Coagulation defect    Chronic obstructive pulmonary disease, unspecified    Abdominal distention    Encounter for assessment of ascites    Other ascites    Physical deconditioning    AMS (altered mental status)    Primary hypertension    Cardiomyopathy, unspecified type (HCC)    Pneumonia, bacterial

## 2025-03-20 NOTE — PROGRESS NOTES
Medical Student Progress Note      This note is ONLY for student educational purposes. Though this note has been reviewed (and co-signed) by a supervising physician, the contents of this note should NOT be utilized for care and/or management of this patient. For the actual plan regarding this patient, please see today's attending physician note.        Name:  Kristine Ramirez    /Age/Sex: 1975  (49 y.o. female)  MRN & CSN:  [id number] & [id number]    PCP: Damon Mireles MD    Date of Admission: 3/17/2025    Subjective:  Chief Complaint:   Chief Complaint   Patient presents with    Fall     Patient arrives from home via [facility] with complaints of fall. Patient reports that she slid out of bed twice today and hit her head. Patient reports being SOB. Hx of kidney failure and stroke.        Today is:  Hospital Day: 4.  Patient seen and examined in CVU-2907/2907-.     Patient was sleeping and laying in bed but woke up. Appeared a little distraught and fatigued. Patient stated she was blind in left eye. Patient denies chest pain but stated a little short of breath.       Medications:  Reviewed    Infusion Medications    sodium chloride      amiodarone      sodium chloride       Scheduled Medications    sodium chloride flush  5-40 mL IntraVENous 2 times per day    amLODIPine  5 mg Oral Daily    aspirin  81 mg Oral Daily    cloNIDine  0.2 mg Oral TID    pantoprazole  40 mg Oral BID AC    sevelamer  2,400 mg Oral TID WC    tiotropium-olodaterol  2 puff Inhalation Daily    sodium chloride flush  5-40 mL IntraVENous 2 times per day    heparin (porcine)  5,000 Units SubCUTAneous 3 times per day    metoprolol tartrate  25 mg Oral BID    sennosides-docusate sodium  2 tablet Oral BID     PRN Meds: oxyCODONE-acetaminophen, HYDROmorphone, lidocaine PF, morphine, sodium chloride flush, sodium chloride, acetaminophen, albuterol sulfate HFA, ALPRAZolam, albuterol, sodium chloride flush, sodium chloride,  embolism.   2. Cardiomegaly with pericardial effusion up to 4.5 cm thickness along the   right margin. Dedicated cardiac imaging such as echo may be considered to   evaluate for constrictive pericardial effusion.   3. Interstitial edema pattern with moderate right and small to moderate left   pleural effusions and adjacent atelectasis.   4. Partially imaged upper abdominal ascites.   5. Body wall edema right asymmetric greater than left.         XR CHEST PORTABLE   Final Result   Pronounced cardiac silhouette enlargement again demonstrated consistent with   underlying pericardial effusion.      Mild interstitial edema and small pleural effusions.         CT LUMBAR SPINE BONY RECONSTRUCTION   Final Result   1. No evidence of traumatic injury to the chest, abdomen, or pelvis, within   the limits of a noncontrast exam.   2. No evidence of traumatic injury to the thoracic spine.   3. No evidence of traumatic injury to the lumbar spine.   4. Large pericardial effusion.   5. Small pleural effusions and dependent opacities favoring subsegmental   atelectasis.   6. Small volume abdominopelvic ascites and body wall edema.   7. Cholelithiasis.   8. Large stool burden predominates in the proximal colon.         CT THORACIC SPINE BONY RECONSTRUCTION   Final Result   1. No evidence of traumatic injury to the chest, abdomen, or pelvis, within   the limits of a noncontrast exam.   2. No evidence of traumatic injury to the thoracic spine.   3. No evidence of traumatic injury to the lumbar spine.   4. Large pericardial effusion.   5. Small pleural effusions and dependent opacities favoring subsegmental   atelectasis.   6. Small volume abdominopelvic ascites and body wall edema.   7. Cholelithiasis.   8. Large stool burden predominates in the proximal colon.         CT CERVICAL SPINE WO CONTRAST   Final Result   Chronic findings in the brain without acute CT abnormality identified.      No acute osseous abnormality identified in the

## 2025-03-20 NOTE — PROGRESS NOTES
Hospitalist paged regarding patient reports of 8/10 chest pain due to drain placement. Medication order placed. See EMAR

## 2025-03-20 NOTE — PROGRESS NOTES
[facility]  Progress ntoes  [phone]      Chief Complaint   Patient presents with    Fall     Patient arrives from home via [facility] with complaints of fall. Patient reports that she slid out of bed twice today and hit her head. Patient reports being SOB. Hx of kidney failure and stroke.             History of Present Illness:  Kristine Ramirez is a 49 y.o. patient who presented to the hospital with complaints of falling out of bed. I have been asked to provide consultation regarding further management and testing. The patient reports that she went blind in 2020 due a stroke and macular degeneration. She states that she has been having intermittent chest discomfort over the last few months. She reports that it is central and right sided. It worsens with a deep breath and wheeling her wheel chair. It typically lasts an hour. It radiates to her back. She denies associated palpitations, diaphoresis, or nausea. She is not currently having any chest pain. She denies recent viral illness. She states that she has not had a bowel movement in over a week. She continues to smoke. No history of DVT/PE.     Subjective: less than 50 ml of pericardial fluid. Patient reports that she is improving.     Past Medical History:   has a past medical history of Acute respiratory failure due to COVID-19 ([facility]), Arterial ischemic stroke, ICA, left, acute ([facility]), Blind in both eyes, Cerebral artery occlusion with cerebral infarction ([facility]), CHF (congestive heart failure) ([facility]), Chronic kidney disease, COPD (chronic obstructive pulmonary disease) ([facility]), Depression, Diabetes mellitus out of control, Diabetes mellitus, type II ([facility]), Diabetic neuropathy associated with type 2 diabetes mellitus ([facility]), Generalized headaches, Hypertension, Infertility, Insomnia, Migraine headache, Mixed hyperlipidemia, Otitis media, Pelvic abscess in female, Pneumonia, Stroke ([facility]), and Stroke ([facility]).    Surgical History:   has a past  suppository 650 mg  650 mg Rectal Q6H PRN Elyse Haro APRN - CNP        heparin (porcine) injection 5,000 Units  5,000 Units SubCUTAneous 3 times per day Elyse Haro APRN - CNP   5,000 Units at 03/20/25 0850    metoprolol tartrate (LOPRESSOR) tablet 25 mg  25 mg Oral BID Graham Raza MD   25 mg at 03/20/25 0841    sennosides-docusate sodium (SENOKOT-S) 8.6-50 MG tablet 2 tablet  2 tablet Oral BID Graham Raza MD   2 tablet at 03/20/25 0841    bisacodyl (DULCOLAX) suppository 10 mg  10 mg Rectal Daily PRN Graham Raza MD            Allergies:  Amoxicillin, Atorvastatin, Levofloxacin, Levofloxacin, Vancomycin, and Tape [adhesive tape]     Review of Systems:     Constitutional: there has been no unanticipated weight loss. There's been no change in energy level, sleep pattern, or activity level.     Eyes: No visual changes or diplopia. No scleral icterus.  ENT: No Headaches, hearing loss or vertigo. No mouth sores or sore throat.  Cardiovascular: Reviewed in HPI  Respiratory: No cough or wheezing, no sputum production. No hematemesis.    Gastrointestinal: No abdominal pain, appetite loss, blood in stools. No change in bowel or bladder habits.  Genitourinary: No dysuria, trouble voiding, or hematuria.  Musculoskeletal:  No gait disturbance, weakness or joint complaints.  Integumentary: No rash or pruritis.  Neurological: No headache, diplopia, change in muscle strength, numbness or tingling. No change in gait, balance, coordination, mood, affect, memory, mentation, behavior.  Psychiatric: No anxiety, no depression.  Endocrine: No malaise, fatigue or temperature intolerance. No excessive thirst, fluid intake, or urination. No tremor.  Hematologic/Lymphatic: No abnormal bruising or bleeding, blood clots or swollen lymph nodes.  Allergic/Immunologic: No nasal congestion or hives.      Physical Examination:    Vitals:    03/20/25 1306   BP: 138/72   Pulse: 82   Resp:    Temp: 98 °F (36.7 °C)   SpO2: 93%

## 2025-03-20 NOTE — PROGRESS NOTES
Hospitalist Progress Note    Name:  Kristine Ramirez    /Age/Sex: 1975  (49 y.o. female)  MRN & CSN:  [id number] & [id number]    PCP: Damon Mireles MD    Date of Admission: 3/17/2025    Patient Status:  Inpatient     Chief Complaint:   Chief Complaint   Patient presents with    Fall     Patient arrives from home via [facility] with complaints of fall. Patient reports that she slid out of bed twice today and hit her head. Patient reports being SOB. Hx of kidney failure and stroke.        Hospital Course:   Patient admitted for fall at home.  Also complaining of chest pain on admission.  Elevated troponin in ED.  Echo on 3/17/2025 showed LVEF 50-55% but also showed large circumferential pericardial effusion.  Cardiology consulted.    S/p pericardiocentesis with pericardial drain placed on 3/18.    Patient is an ESRD patient.  Nephrology consulted.    Subjective:  Today is:  Hospital Day: 4.  Patient seen and examined in CVU-2907/2907-.     Lying in bed.  States her chest pain is improving.  Eating and drinking okay.      Medications:  Reviewed    Infusion Medications    sodium chloride      amiodarone      sodium chloride       Scheduled Medications    sodium chloride flush  5-40 mL IntraVENous 2 times per day    amLODIPine  5 mg Oral Daily    aspirin  81 mg Oral Daily    cloNIDine  0.2 mg Oral TID    pantoprazole  40 mg Oral BID AC    sevelamer  2,400 mg Oral TID WC    tiotropium-olodaterol  2 puff Inhalation Daily    sodium chloride flush  5-40 mL IntraVENous 2 times per day    heparin (porcine)  5,000 Units SubCUTAneous 3 times per day    metoprolol tartrate  25 mg Oral BID    sennosides-docusate sodium  2 tablet Oral BID     PRN Meds: oxyCODONE-acetaminophen, HYDROmorphone, lidocaine PF, morphine, sodium chloride flush, sodium chloride, acetaminophen, albuterol sulfate HFA, ALPRAZolam, albuterol, sodium chloride flush, sodium chloride, ondansetron **OR** ondansetron, polyethylene  hypoglycemia    DVT ppx: Heparin  GI ppx: Diet/Tube Feeds  Diet: ADULT DIET; Regular; Low Fat/Low Chol/High Fiber/2 gm Na  Code Status: Full Code    PT/OT Eval Status: Ordered    Disposition:  Pericardial drain in place.  Will eventually discharge home when stable, hopefully in next 48-72 hours.        Luiz Tim,   3/20/2025  5:27 PM

## 2025-03-20 NOTE — PROGRESS NOTES
PRN 0.5 mg dilaudid added to MAR r/t 7/10 pain at JAXON drain site. Site WNL.  Electronically signed by Anat Miller RN on 3/20/2025 at 10:44 AM

## 2025-03-20 NOTE — PROGRESS NOTES
Pericardial drain removed under sterile conditions. Patient tolerated well without hemodynamic changes.  Repeat limited echo in am. If no re-accumulation, plan to dc after dialysis

## 2025-03-21 ENCOUNTER — APPOINTMENT (OUTPATIENT)
Age: 50
End: 2025-03-21
Attending: INTERNAL MEDICINE
Payer: COMMERCIAL

## 2025-03-21 VITALS
HEIGHT: 67 IN | RESPIRATION RATE: 16 BRPM | SYSTOLIC BLOOD PRESSURE: 155 MMHG | DIASTOLIC BLOOD PRESSURE: 72 MMHG | BODY MASS INDEX: 23.56 KG/M2 | WEIGHT: 150.13 LBS | HEART RATE: 93 BPM | TEMPERATURE: 97 F | OXYGEN SATURATION: 96 %

## 2025-03-21 DIAGNOSIS — I31.39 PERICARDIAL EFFUSION: Primary | ICD-10-CM

## 2025-03-21 LAB
ALBUMIN SERPL-MCNC: 3.6 G/DL (ref 3.4–5)
ANION GAP SERPL CALCULATED.3IONS-SCNC: 14 MMOL/L (ref 3–16)
BACTERIA FLD AEROBE CULT: NORMAL
BASOPHILS # BLD: 0 K/UL (ref 0–0.2)
BASOPHILS NFR BLD: 0.6 %
BUN SERPL-MCNC: 25 MG/DL (ref 7–20)
CALCIUM SERPL-MCNC: 8.5 MG/DL (ref 8.3–10.6)
CHLORIDE SERPL-SCNC: 93 MMOL/L (ref 99–110)
CO2 SERPL-SCNC: 24 MMOL/L (ref 21–32)
CREAT SERPL-MCNC: 4 MG/DL (ref 0.6–1.1)
DEPRECATED RDW RBC AUTO: 19.2 % (ref 12.4–15.4)
ECHO AO ROOT DIAM: 2.8 CM
ECHO AO ROOT INDEX: 1.54 CM/M2
ECHO AV AREA PEAK VELOCITY: 2 CM2
ECHO AV AREA VTI: 1.9 CM2
ECHO AV AREA/BSA PEAK VELOCITY: 1.1 CM2/M2
ECHO AV AREA/BSA VTI: 1 CM2/M2
ECHO AV MEAN GRADIENT: 6 MMHG
ECHO AV MEAN VELOCITY: 1.2 M/S
ECHO AV PEAK GRADIENT: 11 MMHG
ECHO AV PEAK VELOCITY: 1.7 M/S
ECHO AV VELOCITY RATIO: 0.71
ECHO AV VTI: 37.1 CM
ECHO BSA: 1.83 M2
ECHO IVC PROX: 2 CM
ECHO LA AREA 2C: 20.4 CM2
ECHO LA AREA 4C: 22.2 CM2
ECHO LA DIAMETER INDEX: 2.8 CM/M2
ECHO LA DIAMETER: 5.1 CM
ECHO LA MAJOR AXIS: 5.5 CM
ECHO LA MINOR AXIS: 5.7 CM
ECHO LA TO AORTIC ROOT RATIO: 1.82
ECHO LA VOL BP: 66 ML (ref 22–52)
ECHO LA VOL MOD A2C: 60 ML (ref 22–52)
ECHO LA VOL MOD A4C: 71 ML (ref 22–52)
ECHO LA VOL/BSA BIPLANE: 36 ML/M2 (ref 16–34)
ECHO LA VOLUME INDEX MOD A2C: 33 ML/M2 (ref 16–34)
ECHO LA VOLUME INDEX MOD A4C: 39 ML/M2 (ref 16–34)
ECHO LV EDV A2C: 89 ML
ECHO LV EDV A4C: 92 ML
ECHO LV EDV INDEX A4C: 51 ML/M2
ECHO LV EDV NDEX A2C: 49 ML/M2
ECHO LV EF PHYSICIAN: 50 %
ECHO LV EJECTION FRACTION A2C: 58 %
ECHO LV EJECTION FRACTION A4C: 46 %
ECHO LV EJECTION FRACTION BIPLANE: 53 % (ref 55–100)
ECHO LV ESV A2C: 37 ML
ECHO LV ESV A4C: 50 ML
ECHO LV ESV INDEX A2C: 20 ML/M2
ECHO LV ESV INDEX A4C: 27 ML/M2
ECHO LV FRACTIONAL SHORTENING: 22 % (ref 28–44)
ECHO LV INTERNAL DIMENSION DIASTOLE INDEX: 2.47 CM/M2
ECHO LV INTERNAL DIMENSION DIASTOLIC: 4.5 CM (ref 3.9–5.3)
ECHO LV INTERNAL DIMENSION SYSTOLIC INDEX: 1.92 CM/M2
ECHO LV INTERNAL DIMENSION SYSTOLIC: 3.5 CM
ECHO LV IVSD: 0.9 CM (ref 0.6–0.9)
ECHO LV MASS 2D: 132.8 G (ref 67–162)
ECHO LV MASS INDEX 2D: 73 G/M2 (ref 43–95)
ECHO LV POSTERIOR WALL DIASTOLIC: 0.9 CM (ref 0.6–0.9)
ECHO LV RELATIVE WALL THICKNESS RATIO: 0.4
ECHO LVOT AREA: 2.8 CM2
ECHO LVOT AV VTI INDEX: 0.66
ECHO LVOT DIAM: 1.9 CM
ECHO LVOT MEAN GRADIENT: 3 MMHG
ECHO LVOT PEAK GRADIENT: 5 MMHG
ECHO LVOT PEAK VELOCITY: 1.2 M/S
ECHO LVOT STROKE VOLUME INDEX: 38.3 ML/M2
ECHO LVOT SV: 69.7 ML
ECHO LVOT VTI: 24.6 CM
ECHO MV AREA VTI: 1.7 CM2
ECHO MV LVOT VTI INDEX: 1.65
ECHO MV MAX VELOCITY: 1.6 M/S
ECHO MV MEAN GRADIENT: 4 MMHG
ECHO MV MEAN VELOCITY: 0.9 M/S
ECHO MV PEAK GRADIENT: 11 MMHG
ECHO MV REGURGITANT PEAK GRADIENT: 74 MMHG
ECHO MV REGURGITANT PEAK VELOCITY: 4.3 M/S
ECHO MV VTI: 40.7 CM
ECHO RA AREA 4C: 17.6 CM2
ECHO RA END SYSTOLIC VOLUME APICAL 4 CHAMBER INDEX BSA: 27 ML/M2
ECHO RA VOLUME: 49 ML
ECHO RV BASAL DIMENSION: 3.9 CM
ECHO RV LONGITUDINAL DIMENSION: 6.2 CM
ECHO RV MID DIMENSION: 2.3 CM
EOSINOPHIL # BLD: 0.1 K/UL (ref 0–0.6)
EOSINOPHIL NFR BLD: 2.1 %
EST. AVERAGE GLUCOSE BLD GHB EST-MCNC: 145.6 MG/DL
GFR SERPLBLD CREATININE-BSD FMLA CKD-EPI: 13 ML/MIN/{1.73_M2}
GLUCOSE SERPL-MCNC: 145 MG/DL (ref 70–99)
GRAM STN SPEC: NORMAL
HBA1C MFR BLD: 6.7 %
HCT VFR BLD AUTO: 34.1 % (ref 36–48)
HGB BLD-MCNC: 10.9 G/DL (ref 12–16)
LYMPHOCYTES # BLD: 0.5 K/UL (ref 1–5.1)
LYMPHOCYTES NFR BLD: 10.8 %
MCH RBC QN AUTO: 29.5 PG (ref 26–34)
MCHC RBC AUTO-ENTMCNC: 31.9 G/DL (ref 31–36)
MCV RBC AUTO: 92.3 FL (ref 80–100)
MONOCYTES # BLD: 0.6 K/UL (ref 0–1.3)
MONOCYTES NFR BLD: 11.7 %
NEUTROPHILS # BLD: 3.8 K/UL (ref 1.7–7.7)
NEUTROPHILS NFR BLD: 74.8 %
PHOSPHATE SERPL-MCNC: 3.4 MG/DL (ref 2.5–4.9)
PLATELET # BLD AUTO: 231 K/UL (ref 135–450)
PMV BLD AUTO: 8 FL (ref 5–10.5)
POTASSIUM SERPL-SCNC: 4.8 MMOL/L (ref 3.5–5.1)
RBC # BLD AUTO: 3.69 M/UL (ref 4–5.2)
SODIUM SERPL-SCNC: 131 MMOL/L (ref 136–145)
WBC # BLD AUTO: 5 K/UL (ref 4–11)

## 2025-03-21 PROCEDURE — 6360000002 HC RX W HCPCS: Performed by: STUDENT IN AN ORGANIZED HEALTH CARE EDUCATION/TRAINING PROGRAM

## 2025-03-21 PROCEDURE — 6370000000 HC RX 637 (ALT 250 FOR IP): Performed by: HOSPITALIST

## 2025-03-21 PROCEDURE — 93321 DOPPLER ECHO F-UP/LMTD STD: CPT | Performed by: INTERNAL MEDICINE

## 2025-03-21 PROCEDURE — 83516 IMMUNOASSAY NONANTIBODY: CPT

## 2025-03-21 PROCEDURE — 94761 N-INVAS EAR/PLS OXIMETRY MLT: CPT

## 2025-03-21 PROCEDURE — 94640 AIRWAY INHALATION TREATMENT: CPT

## 2025-03-21 PROCEDURE — 6370000000 HC RX 637 (ALT 250 FOR IP): Performed by: NURSE PRACTITIONER

## 2025-03-21 PROCEDURE — 83036 HEMOGLOBIN GLYCOSYLATED A1C: CPT

## 2025-03-21 PROCEDURE — 85025 COMPLETE CBC W/AUTO DIFF WBC: CPT

## 2025-03-21 PROCEDURE — 80069 RENAL FUNCTION PANEL: CPT

## 2025-03-21 PROCEDURE — 93308 TTE F-UP OR LMTD: CPT

## 2025-03-21 PROCEDURE — 99232 SBSQ HOSP IP/OBS MODERATE 35: CPT | Performed by: INTERNAL MEDICINE

## 2025-03-21 PROCEDURE — 93308 TTE F-UP OR LMTD: CPT | Performed by: INTERNAL MEDICINE

## 2025-03-21 PROCEDURE — 93325 DOPPLER ECHO COLOR FLOW MAPG: CPT | Performed by: INTERNAL MEDICINE

## 2025-03-21 PROCEDURE — 90935 HEMODIALYSIS ONE EVALUATION: CPT

## 2025-03-21 RX ORDER — OXYCODONE AND ACETAMINOPHEN 5; 325 MG/1; MG/1
1 TABLET ORAL EVERY 4 HOURS PRN
Qty: 18 TABLET | Refills: 0 | Status: SHIPPED | OUTPATIENT
Start: 2025-03-21 | End: 2025-03-24

## 2025-03-21 RX ADMIN — CLONIDINE HYDROCHLORIDE 0.2 MG: 0.1 TABLET ORAL at 15:12

## 2025-03-21 RX ADMIN — METOPROLOL TARTRATE 25 MG: 25 TABLET, FILM COATED ORAL at 15:13

## 2025-03-21 RX ADMIN — AMLODIPINE BESYLATE 5 MG: 5 TABLET ORAL at 15:13

## 2025-03-21 RX ADMIN — HYDROMORPHONE HYDROCHLORIDE 0.5 MG: 1 INJECTION, SOLUTION INTRAMUSCULAR; INTRAVENOUS; SUBCUTANEOUS at 03:16

## 2025-03-21 RX ADMIN — HYDROMORPHONE HYDROCHLORIDE 0.5 MG: 1 INJECTION, SOLUTION INTRAMUSCULAR; INTRAVENOUS; SUBCUTANEOUS at 10:03

## 2025-03-21 RX ADMIN — PANTOPRAZOLE SODIUM 40 MG: 40 TABLET, DELAYED RELEASE ORAL at 15:13

## 2025-03-21 RX ADMIN — ASPIRIN 81 MG: 81 TABLET, COATED ORAL at 15:12

## 2025-03-21 RX ADMIN — TIOTROPIUM BROMIDE AND OLODATEROL 2 PUFF: 3.124; 2.736 SPRAY, METERED RESPIRATORY (INHALATION) at 08:52

## 2025-03-21 RX ADMIN — ALPRAZOLAM 0.5 MG: 0.5 TABLET ORAL at 00:50

## 2025-03-21 RX ADMIN — ALPRAZOLAM 0.5 MG: 0.5 TABLET ORAL at 10:04

## 2025-03-21 RX ADMIN — HYDROMORPHONE HYDROCHLORIDE 0.5 MG: 1 INJECTION, SOLUTION INTRAMUSCULAR; INTRAVENOUS; SUBCUTANEOUS at 15:30

## 2025-03-21 ASSESSMENT — PAIN DESCRIPTION - ORIENTATION
ORIENTATION: LEFT
ORIENTATION: ANTERIOR
ORIENTATION: ANTERIOR

## 2025-03-21 ASSESSMENT — ENCOUNTER SYMPTOMS
ABDOMINAL PAIN: 0
EYE REDNESS: 0
CONSTIPATION: 0
CHEST TIGHTNESS: 0
COUGH: 0
SHORTNESS OF BREATH: 1
DIARRHEA: 0
ABDOMINAL DISTENTION: 0
FACIAL SWELLING: 0
EYE DISCHARGE: 0
BLOOD IN STOOL: 0
NAUSEA: 0
VOMITING: 0
PHOTOPHOBIA: 0

## 2025-03-21 ASSESSMENT — PAIN DESCRIPTION - DESCRIPTORS
DESCRIPTORS: STABBING
DESCRIPTORS: ACHING
DESCRIPTORS: SHARP

## 2025-03-21 ASSESSMENT — PAIN SCALES - GENERAL
PAINLEVEL_OUTOF10: 7
PAINLEVEL_OUTOF10: 8
PAINLEVEL_OUTOF10: 7

## 2025-03-21 ASSESSMENT — PAIN DESCRIPTION - LOCATION
LOCATION: CHEST

## 2025-03-21 NOTE — PROGRESS NOTES
CLINICAL PHARMACY NOTE: MEDS TO BEDS    Total # of Prescriptions Filled: 1   The following medications were delivered to the patient:  OXYCODONE - ACETAMINOPHEN 5 - 325 TABS    Additional Documentation: Tino LEACH approved to deliver medication to patient room=signed  [facility]

## 2025-03-21 NOTE — PROGRESS NOTES
Occupational Therapy  Kristine Ramirez    Patient in the process of leaving for dialysis.   Will attempt OT as schedule allows.    Thank you,  Soledad Dominguez, OTR/L [id number]

## 2025-03-21 NOTE — PROGRESS NOTES
Spiritual Health History and Assessment/Progress Note  [facility]    (P) Spiritual/Emotional Needs,  ,  ,      Name: Kristine Ramirez MRN: [id number]    Age: 49 y.o.     Sex: female   Language: English   Christianity: None   Pericardial effusion     Date: 3/21/2025            Total Time Calculated: (P) 35 min              Spiritual Assessment began in [facility] CVU        Referral/Consult From: (P) Rounding   Encounter Overview/Reason: (P) Spiritual/Emotional Needs  Service Provided For: (P) Patient    Rain, Belief, Meaning:   Patient Other: Pt identifies as Oriental orthodox  Family/Friends No family/friends present      Importance and Influence:  Patient Other: Pt says that her rain is unimportant to her and does not impact her health journey at all.  Family/Friends No family/friends present    Community:  Patient Other: Pt has a , sister and niece.  Family/Friends No family/friends present    Assessment and Plan of Care:     Patient Interventions include: Facilitated expression of thoughts and feelings and Other: Pt requested prayer and  prayed with her.  Family/Friends Interventions include: No family/friends present    Patient Plan of Care: Spiritual Care available upon further referral  Family/Friends Plan of Care: No family/friends present    Electronically signed by Chaplain Marianne on 3/21/2025 at 5:05 PM

## 2025-03-21 NOTE — PROGRESS NOTES
Patient transported to dialysis via bed.  Medicated with prn's only, held all other meds until after dialysis.  No changes.  VSS.  SR.

## 2025-03-21 NOTE — PROGRESS NOTES
Gastroenterology Progress Note            Kristine Ramirez is a 49 y.o. female patient.  1. Generalized weakness    2. Multiple falls    3. Acute pericarditis, unspecified type    4. ESRD (end stage renal disease) on dialysis (HCC)    5. Acute respiratory failure with hypoxia and hypercapnia (HCC)    6. Pericardial effusion    7. Cardiomyopathy, unspecified type (HCC)        SUBJECTIVE:  Feels ok today.  Eating some without vomiting today.    Physical    VITALS:  BP (!) 155/72   Pulse 93   Temp 97 °F (36.1 °C)   Resp 16   Ht 1.702 m (5' 7\")   Wt 68.1 kg (150 lb 2.1 oz)   LMP 2022   SpO2 96%   BMI 23.51 kg/m²   TEMPERATURE:  Current - Temp: 97 °F (36.1 °C); Max - Temp  Av.4 °F (36.3 °C)  Min: 97 °F (36.1 °C)  Max: 98.2 °F (36.8 °C)    Abdomen soft, ND, NT, no HSM, Bowel sounds normal     Data      Recent Labs     25  0337   WBC 5.0   HGB 10.9*   HCT 34.1*   MCV 92.3        Recent Labs     25  0300 25  0310 25  0337    136 131*   K 4.0 4.2 4.8    99 93*   CO2 23 26 24   PHOS 4.8 2.5 3.4   BUN 28* 20 25*   CREATININE 4.1* 3.2* 4.0*     No results for input(s): \"AST\", \"ALT\", \"BILIDIR\", \"BILITOT\", \"ALKPHOS\" in the last 72 hours.    Invalid input(s): \"ALB\"  No results for input(s): \"LIPASE\", \"AMYLASE\" in the last 72 hours.          ASSESSMENT :    Elevated alk phos -this has been elevated intermittently since .  GGT was elevated.  No liver abnormality on noncontrast CT. this is most likely resulting from passive congestion of the liver due to her heart failure.  Medications can also cause these elevations.  Finally the process causing the ascites may be peritoneal and infiltrative of the liver.  Oncology is concerned there may be a neoplasm involving the breast.  This certainly could metastatisize to the peritoneum.  I discussed the issue with Dr. Haney and we agreed that he will evaluate the peritoneum during her oncological workup  if need be.     Ascites  -only small volume on CT now.  Had 2 prior paracenteses last year.  SAAG from 9/4/24 was low at 0.3 which can be seen with nephrotic ascites.  protein level, however, was high at 4.5. Prior ascitic fluid cytology with rare mildly atypical cell cluster against the background of mixed inflammation.      Chronic intermittent nausea and vomiting -prior EGDs with retained food concerning for gastroparesis.  Appears she is on Trulicity which can cause slow gastric emptying.      PLAN   :  No further Liver w/u planned  Oncology workup per Dr. Haney  Cardiology and maximizing her cardiac function in case passive congestion is contributing.    GI will sign off.  Please call with questions.    Raj Nieves MD  [facility]  3/21/2025

## 2025-03-21 NOTE — CONSULTS
[facility]    Consult Note      Requesting Physician:  Dr. Segura    CHIEF COMPLAINT:  Fall chest pain      HISTORY OF PRESENT ILLNESS:    Ms. Ramirez  is a 49 y.o. female we are seeing in consultation for Hemorrhagic pericardial effusion and right breast mass    ICD-10-CM    1. Generalized weakness  R53.1       2. Multiple falls  R29.6       3. Acute pericarditis, unspecified type  I30.9 Echo (TTE) limited (PRN contrast/bubble/strain/3D)     Echo (TTE) limited (PRN contrast/bubble/strain/3D)      4. ESRD (end stage renal disease) on dialysis ([facility])  N18.6     Z99.2       5. Acute respiratory failure with hypoxia and hypercapnia ([facility])  J96.01     J96.02       6. Pericardial effusion  I31.39 Echo (TTE) complete (PRN contrast/bubble/strain/3D)     Echo (TTE) complete (PRN contrast/bubble/strain/3D)     Cardiac procedure     Cardiac procedure     Echo (TTE) limited (PRN contrast/bubble/strain/3D)     Echo (TTE) limited (PRN contrast/bubble/strain/3D)     Echo (TTE) limited (PRN contrast/bubble/strain/3D)     Echo (TTE) limited (PRN contrast/bubble/strain/3D)         49-year-old lady, with history of DM ESRD, on hemodialysis, hypertension, COPD, legally blind.    Patient presented to the hospital on 3/17/2025 with fall and chest pain.  CTPA showed cardiomegaly with pericardial effusion.  Echocardiogram showed large circumferential pericardial effusion.  Pericardial fluid was drained on 3/18/2025.  Cytology is negative for malignancy    Patient reports enlarged breast on the right side.    Medical oncology consultation is requested for evaluation and to rule out malignancy    Patient says that her right breast has been enlarged for last few weeks  She never had mammogram    No anorexia or weight loss  No abdominal pain  No swelling in the legs    Past Medical History:  Past Medical History:

## 2025-03-21 NOTE — CARE COORDINATION
03/21/25 1530   IMM Letter   IMM Letter given to Patient/Family/Significant other/Guardian/POA/by: Gabby Montiel RN CM - pt was in HD earlier today and states ok with less than four hrs notice to appeal   IMM Letter time given: 1530  (copy made for hard chart)

## 2025-03-21 NOTE — DISCHARGE SUMMARY
Hospital Medicine Discharge Summary    Name:  Kristine Ramirez  Gender: female  : 1975  49 y.o.  MRN: [id number]    PCP: Damon Mireles MD     Date of Admission:  3/17/2025 12:23 AM  Discharge Date: 3/21/2025    Admitting Physician: Graham Raza MD  Discharge Physician: Luiz Tim DO    Communication to PCP  -no med changes  -will need cardiology follow up for repeat echo      Discharge Diagnoses:       Active Hospital Problems    Diagnosis     Pericardial effusion [I31.39]     Primary hypertension [I10]     ESRD on dialysis (HCC) [N18.6, Z99.2]     Polyneuropathy due to type 2 diabetes mellitus (HCC) [E11.42]     Type 2 diabetes mellitus with hyperlipidemia (HCC) [E11.69, E78.5]     Mixed hyperlipidemia [E78.2]        The patient was seen and examined on day of discharge and this discharge summary is in conjunction with any daily progress note from day of discharge.    Hospital Course:  Kristine Ramirez is a 49 y.o. year old female who presented to [facility] on 3/17/2025 12:23 AM.      Patient admitted for fall at home.  Also complaining of chest pain on admission.  Elevated troponin in ED.  Echo on 3/17/2025 showed LVEF 50-55% but also showed large circumferential pericardial effusion.  Cardiology consulted.     S/p pericardiocentesis with pericardial drain placed on 3/18.     Patient is an ESRD patient.  Nephrology consulted.    Repeat echo on 3/21 showed no re-demonstration of pericardial effusion.  Workup was negative for any explanation of pericardial effusion, will continue to work on diagnoses outpatient with cardiology and oncology.    On the last day of hospital stay, patient was doing well.  No chest pain or shortness of breath.  Eating okay.  The patient expressed appropriate understanding of and agreement with the discharge recommendations, medications, and plan.      Physical Exam Performed:     BP (!) 164/78   Pulse 86   Temp 97.1 °F (36.2 °C)   Resp 16   Ht    umeclidinium-vilanterol (ANORO ELLIPTA) 62.5-25 MCG/ACT inhaler Inhale 1 puff into the lungs daily as needed (Respiratory symptoms)      levalbuterol (XOPENEX HFA) 45 MCG/ACT inhaler Inhale 1 puff into the lungs every 8 hours as needed for Wheezing  Qty: 15 g, Refills: 5    Comments: Stop albuterol  Associated Diagnoses: Pulmonary emphysema, unspecified emphysema type (HCC)      aspirin 81 MG EC tablet Take 1 tablet by mouth daily  Qty: 30 tablet, Refills: 11    Associated Diagnoses: Risk of myocardial infarction or stroke 7.5% or greater in next 10 years      albuterol sulfate HFA (PROVENTIL;VENTOLIN;PROAIR) 108 (90 Base) MCG/ACT inhaler Inhale 2 puffs into the lungs every 6 hours as needed for Wheezing or Shortness of Breath  Qty: 18 g, Refills: 2    Associated Diagnoses: Chronic obstructive pulmonary disease, unspecified COPD type (HCC)      Handicap [facility] Does not apply route Expires on 5/18/2028  Qty: 1 each, Refills: 0    Associated Diagnoses: Type 2 diabetes mellitus with obesity ([facility]); Dependent on walker for ambulation             Total time spent on discharge was 35 minutes in the examination, evaluation, counseling and review of medications and discharge plan.      Signed:    Luiz Tim DO   3/21/2025  11:40 AM

## 2025-03-21 NOTE — PROGRESS NOTES
[facility]al CVU Note    Patient Active Problem List   Diagnosis    Type 2 diabetes mellitus with hyperlipidemia (HCC)    Mixed hyperlipidemia    Migraine headache    Anemia    Diabetic foot infection (HCC)    Pyogenic inflammation of bone (HCC)    History of medication noncompliance    Osteomyelitis of left foot (HCC)    Nephrotic syndrome    Peripheral edema    Pulmonary edema    Right sided numbness    Tobacco dependence    H/O: CVA (cerebrovascular accident)    HTN (hypertension), benign    DM (diabetes mellitus), secondary, uncontrolled, 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, associated with type 2 diabetes mellitus (HCC)    Cellulitis of right foot    Hidradenitis suppurativa    Hypocalcemia    Non-toxic multinodular goiter    Polyneuropathy due to type 2 diabetes mellitus (HCC)    Proliferative diabetic retinopathy associated with type 2 diabetes mellitus (HCC)    Epiglottitis    Recurrent falls    Hypotension    Leukocytosis    Volume overload    Hemodialysis catheter dysfunction    Hypoproteinemia    GABRIELA (obstructive sleep apnea)    Type 2 diabetes mellitus with obesity (HCC)    Wheelchair dependence    ESRD on dialysis (HCC)    Hyperkalemia    Suspected COVID-19 virus infection    Dependent on walker for ambulation    Medication management contract agreement - signed on 5/18/2023    Controlled drug dependence (HCC)    Generalized anxiety disorder with panic attacks    Coagulation defect    Chronic obstructive pulmonary disease, unspecified (HCC)    Abdominal distention    Encounter for assessment of ascites    Other ascites    Physical deconditioning    AMS (altered mental status)    Primary hypertension    Cardiomyopathy, unspecified type (HCC)    Pneumonia, bacterial     Pericardial effusion       Past Medical History:   has a past medical history of Acute respiratory failure due to COVID-19 ([facility]), Arterial ischemic stroke, ICA, left, acute ([facility]), Blind in both eyes, Cerebral artery occlusion with cerebral infarction ([facility]), CHF (congestive heart failure) ([facility]), Chronic kidney disease, COPD (chronic obstructive pulmonary disease) ([facility]), Depression, Diabetes mellitus out of control, Diabetes mellitus, type II ([facility]), Diabetic neuropathy associated with type 2 diabetes mellitus ([facility]), Generalized headaches, Hypertension, Infertility, Insomnia, Migraine headache, Mixed hyperlipidemia, Otitis media, Pelvic abscess in female, Pneumonia, Stroke ([facility]), and Stroke ([facility]).    Past Social History:   reports that she has been smoking cigarettes. She has a 15 pack-year smoking history. She has never used smokeless tobacco. She reports that she does not drink alcohol and does not use drugs.    Subjective:    HD today, attempting 3L fluid removal.  SOB better    Review of Systems   Constitutional:  Positive for fatigue. Negative for activity change, appetite change, chills, fever and unexpected weight change.   HENT:  Negative for congestion and facial swelling.    Eyes:  Negative for photophobia, discharge and redness.   Respiratory:  Positive for shortness of breath. Negative for cough and chest tightness.    Cardiovascular:  Negative for chest pain, palpitations and leg swelling.   Gastrointestinal:  Negative for abdominal distention, abdominal pain, blood in stool, constipation, diarrhea, nausea and vomiting.   Endocrine: Negative for cold intolerance, heat intolerance and polyuria.   Genitourinary:  Negative for decreased urine volume, difficulty urinating, flank pain and hematuria.   Musculoskeletal:  Negative for joint swelling and neck pain.   Neurological:  Negative for dizziness, seizures, syncope, speech difficulty, light-headedness and headaches.   Hematological:  Does not bruise/bleed

## 2025-03-21 NOTE — CARE COORDINATION
- P [phone] - F [phone]  [address]  [facility] [address]  Phone: [phone] Fax: [phone]    [facility] [id number] - [place], [place] - [address] [phone] - F [phone]  [address]  [facility] [address]  Phone: [phone] Fax: [phone]      Notes:    Additional Case Management Notes: Patient discharged 3/21/2025 to home via [facility].  All discharge needs met per case management     Gabby Montiel RN, BSN  [phone]

## 2025-03-21 NOTE — DIALYSIS
Treatment time: 3 hours  Net UF: 3000 ml     Pre weight: 71.1 kg  Post weight:68.1 kg      Access used: R AVF     Access function: well with -400 ml/min     Medications or blood products given: None      Regular outpatient schedule: MWF     Summary of response to treatment: Patient tolerated treatment well and without any complications. Patient remained stable throughout entire treatment and upon the exiting the dialysis suite via transport.     Report given to Tino Barnett RN and copy of dialysis treatment record placed in chart, to be scanned into EMR.

## 2025-03-21 NOTE — PROGRESS NOTES
Physical Therapy  Kristine Ramirez    Attempted PT treatment this date. Pt currently off floor for HD. Will re-attempt treatment session as schedule allows.     Ning Mccloud PT, DPT [id number]

## 2025-03-21 NOTE — PROGRESS NOTES
[facility]  Progress ntoes  [phone]      Chief Complaint   Patient presents with    Fall     Patient arrives from home via [facility] with complaints of fall. Patient reports that she slid out of bed twice today and hit her head. Patient reports being SOB. Hx of kidney failure and stroke.             History of Present Illness:  Kristine Ramirez is a 49 y.o. patient who presented to the hospital with complaints of falling out of bed. I have been asked to provide consultation regarding further management and testing. The patient reports that she went blind in 2020 due a stroke and macular degeneration. She states that she has been having intermittent chest discomfort over the last few months. She reports that it is central and right sided. It worsens with a deep breath and wheeling her wheel chair. It typically lasts an hour. It radiates to her back. She denies associated palpitations, diaphoresis, or nausea. She is not currently having any chest pain. She denies recent viral illness. She states that she has not had a bowel movement in over a week. She continues to smoke. No history of DVT/PE.     Subjective:  drain removed, repeat echo with trivial effusion. Feels better, wants to go home.     Past Medical History:   has a past medical history of Acute respiratory failure due to COVID-19 ([facility]), Arterial ischemic stroke, ICA, left, acute ([facility]), Blind in both eyes, Cerebral artery occlusion with cerebral infarction ([facility]), CHF (congestive heart failure) ([facility]), Chronic kidney disease, COPD (chronic obstructive pulmonary disease) ([facility]), Depression, Diabetes mellitus out of control, Diabetes mellitus, type II ([facility]), Diabetic neuropathy associated with type 2 diabetes mellitus ([facility]), Generalized headaches, Hypertension, Infertility, Insomnia, Migraine headache, Mixed hyperlipidemia, Otitis media, Pelvic abscess in female, Pneumonia, Stroke ([facility]), and Stroke ([facility]).    Surgical History:   has a

## 2025-03-21 NOTE — PROGRESS NOTES
of ascites    Other ascites    Physical deconditioning    AMS (altered mental status)    Primary hypertension    Cardiomyopathy, unspecified type (HCC)    Pneumonia, bacterial    Pericardial effusion       ASSESSMENT AND PLAN:    49-year-old lady with multiple medical problems that include diabetes, hypertension, ESRD on hemodialysis, legal blindness, cardiomyopathy.     Patient is admitted in the hospital since 3/17/2025 with chest pain, she was noted to have pericardial effusion, pericardiocentesis and drain was placed.  It was hemorrhagic pericardial effusion.  Cytology is negative for malignancy     The patient has     1.  Enlarged right breast with peau d'orange appearance of the skin:     -This is suspicious for breast malignancy  -According to patient this has been going on for last couple of months or even longer     -Patient will need outpatient evaluation by breast surgeon, mammogram and ultrasound as well as biopsy.     -CT abdomen and pelvis 3/20/2025 did not show any evidence of obvious metastatic disease.    -Plan of management would be outpatient referral to breast surgeon.  I spoke to Dr. Alvina Brennan about the case.  -She will also need breast imaging studies-mammogram, ultrasound which will be done as outpatient.    -Will ensure outpatient follow-up.             ONCOLOGIC DISPOSITION:      Cristian Haney MD  Please contact through [employer]

## 2025-03-23 LAB
GAMMA INTERFERON BACKGROUND BLD IA-ACNC: 0 IU/ML
M TB IFN-G BLD-IMP: NEGATIVE
M TB IFN-G CD4+ BCKGRND COR BLD-ACNC: 0 IU/ML
M TB IFN-G CD4+CD8+ BCKGRND COR BLD-ACNC: 0 IU/ML
MITOGEN IGNF BCKGRD COR BLD-ACNC: 10 IU/ML

## 2025-03-23 NOTE — PROGRESS NOTES
-Post-Discharge Transitional Care Management Services or Hospital Follow Up-    Patient: Kristine Ramirez     YOB: 1975    Date of Office Visit: 4/3/2025    Hospital name:   [x] [facility]  [] [facility]  [] [facility]  [] [facility]  [] [facility]  [] [facility]  [] [facility])  [] [facility]  [] [facility]  [] [facility]  [] [facility]  [] [facility]  [] [facility])  [] [facility]  [] [facility]   [] Other -     Date of Hospital Admission: 3/17/25    Date of Hospital Discharge: 3/21/25    Readmission Risk Score (high >=14%. Medium >=10%):Readmission Risk Score: 23    Care management risk score Rising risk (score 2-5) and Complex Care (Scores >=6): No Risk Score On File     Non face to face  following discharge, date last encounter closed (first attempt may have been earlier): 03/24/2025 03/24/2025    Call initiated 2 business days of discharge: Yes     Patient Active Problem List   Diagnosis    Type 2 diabetes mellitus with hyperlipidemia (HCC)    Mixed hyperlipidemia    Migraine headache    Anemia    Diabetic foot infection (HCC)    Pyogenic inflammation of bone (HCC)    History of medication noncompliance    Osteomyelitis of left foot (HCC)    Nephrotic syndrome    Peripheral edema    Pulmonary edema    Right sided numbness    Tobacco dependence    H/O: CVA (cerebrovascular accident)    HTN (hypertension), benign    DM (diabetes mellitus), secondary, uncontrolled, w/neurologic complic    Dyslipidemia    Smoker    Panic disorder    Isolated proteinuria    Diabetic peripheral neuropathy (HCC)    Depression    Both eyes affected by proliferative

## 2025-03-24 ENCOUNTER — TELEPHONE (OUTPATIENT)
Dept: FAMILY MEDICINE CLINIC | Age: 50
End: 2025-03-24

## 2025-03-24 ENCOUNTER — TELEPHONE (OUTPATIENT)
Dept: WOMENS IMAGING | Age: 50
End: 2025-03-24

## 2025-03-24 LAB
BACTERIA FLD AEROBE CULT: NORMAL
GRAM STN SPEC: NORMAL

## 2025-03-24 NOTE — TELEPHONE ENCOUNTER
Care Transitions Initial Follow Up Call    Outreach made within 2 business days of discharge: Yes    Patient: Kristine Ramirez Patient : 1975   MRN: [id number]  Reason for Admission: Pericardial effusion   Discharge Date: 3/21/25       Spoke with: lvm       Follow Up  Future Appointments   Date Time Provider Department Center   3/28/2025  9:00 AM F ECHO 2 [facility]   3/31/2025  8:30 AM Diane Velasquez, APRN - CNP [facility]   4/3/2025 10:30 AM Damon Mireles MD [facility] ECC DEP   2025  9:30 AM Scott Segura DO [facility]       GIO DWYER MA

## 2025-03-26 LAB — MITOCHONDRIA M2 AB SER IA-ACNC: 1.2 U/ML (ref 0–4)

## 2025-03-31 LAB
BACTERIA FLD AEROBE CULT: NORMAL
FUNGUS SPEC CULT: NORMAL
GRAM STN SPEC: NORMAL
LOEFFLER MB STN SPEC: NORMAL

## 2025-04-01 LAB
ACID FAST STN SPEC QL: NORMAL
MYCOBACTERIUM SPEC CULT: NORMAL

## 2025-04-02 ENCOUNTER — TELEPHONE (OUTPATIENT)
Dept: SURGERY | Age: 50
End: 2025-04-02

## 2025-04-02 NOTE — TELEPHONE ENCOUNTER
Dr. DE PAZ seen this patient inpatient, spoke to Dr. Brennan. Patient is scheduled for imaging 4/8/25 bilateral Dx, rt limited u/s : possible bx. Will schedule after imaging and possible bx.     Patient needs NP intake completed. Called patient no answer, vm not set up.       Thanks, Yolis        1.  Enlarged right breast with peau d'orange appearance of the skin:     -This is suspicious for breast malignancy  -According to patient this has been going on for last couple of months or even longer     -Patient will need outpatient evaluation by breast surgeon, mammogram and ultrasound as well as biopsy.     -CT abdomen and pelvis 3/20/2025 did not show any evidence of obvious metastatic disease.     -Plan of management would be outpatient referral to breast surgeon.  I spoke to Dr. Alvina Brennan about the case.  -She will also need breast imaging studies-mammogram, ultrasound which will be done as outpatient.   Subjective:       Patient ID: Chey Trujillo is a 82 y.o. Black or  female who presents for follow-up evaluation of CKD. Previously followed by Dr. Gupta.     HPI This is a 81-year-old -American female with longstanding hypertension, 20 years of diabetes, CAD, status post stenting in 2003,  AFib, CHF, and CKD. Arrives for follow up accompanied by her son. Reports chronic intermittent leg edema and SOB, wheezing. Taking all medications as prescribed.    Review of Systems   Constitutional: Positive for fatigue.   Eyes: Negative for discharge.   Respiratory: Negative for cough, shortness of breath and wheezing.    Cardiovascular: Negative for chest pain and palpitations.   Gastrointestinal: Negative for abdominal pain, diarrhea, nausea and vomiting.   Genitourinary: Negative for dysuria, frequency, hematuria and urgency.   Skin: Negative for color change and rash.   Psychiatric/Behavioral: Negative for confusion.   All other systems reviewed and are negative.      Objective:    /80 mmHg  Physical Exam   Constitutional: She is oriented to person, place, and time. She appears well-developed.   Elderly, fragile, on a wheelchair   HENT:   Right Ear: External ear normal.   Left Ear: External ear normal.   Nose: Nose normal.   Mouth/Throat: Normal dentition.   Eyes: Conjunctivae, EOM and lids are normal. Pupils are equal, round, and reactive to light.   Neck: Trachea normal. No thyroid mass present.   Cardiovascular: Normal rate and regular rhythm.    Murmur heard.  Pulmonary/Chest: Effort normal. No respiratory distress. She has no decreased breath sounds. She has no rales.   Decreased breath sounds and dullness to percussion over the right lower lung field   Abdominal: Soft. Bowel sounds are normal. She exhibits no mass. There is no tenderness. There is no rebound. No hernia.   Musculoskeletal: She exhibits edema.   2+ edema   Neurological: She is alert and oriented to person, place, and  time.   Skin: Skin is warm and dry. No rash noted. No erythema.   Psychiatric: She has a normal mood and affect. Judgment normal. Her mood appears not anxious. She does not exhibit a depressed mood.   Oriented to time, place and person.   Vitals reviewed.      LABS  Serum Cr 1.9 mg/dL  UA negative for protein    Assessment:       1. Diabetes mellitus with stage 4 chronic kidney disease GFR 15-29        Plan:         1. CKD stage 3B eGFR ml/min. Etiology unknown, possible diabetic CKD or APOL1-FGGS. Somewhat stable function.   2. Edema/pleural effusion/SOB. Has diastolic HF. On torsemide. Will add 10 mg as om dose.     Summary  1. Change torsemide from 20 mg qam to 20 mg qam + 10 mg qpm  2. RTC in 4 months with BMP, CBC, MBD panel, UA, UPCR

## 2025-04-03 ENCOUNTER — OFFICE VISIT (OUTPATIENT)
Dept: FAMILY MEDICINE CLINIC | Age: 50
End: 2025-04-03
Payer: COMMERCIAL

## 2025-04-03 VITALS
SYSTOLIC BLOOD PRESSURE: 138 MMHG | HEIGHT: 67 IN | OXYGEN SATURATION: 100 % | WEIGHT: 150 LBS | RESPIRATION RATE: 16 BRPM | TEMPERATURE: 97.4 F | BODY MASS INDEX: 23.54 KG/M2 | DIASTOLIC BLOOD PRESSURE: 84 MMHG | HEART RATE: 98 BPM

## 2025-04-03 DIAGNOSIS — J44.9 CHRONIC OBSTRUCTIVE PULMONARY DISEASE, UNSPECIFIED COPD TYPE (HCC): ICD-10-CM

## 2025-04-03 DIAGNOSIS — N18.6 TYPE 2 DM WITH HYPERTENSION AND ESRD ON DIALYSIS (HCC): ICD-10-CM

## 2025-04-03 DIAGNOSIS — F41.1 GENERALIZED ANXIETY DISORDER WITH PANIC ATTACKS: ICD-10-CM

## 2025-04-03 DIAGNOSIS — Z71.9 HEALTH EDUCATION/COUNSELING: ICD-10-CM

## 2025-04-03 DIAGNOSIS — E11.22 TYPE 2 DM WITH HYPERTENSION AND ESRD ON DIALYSIS (HCC): ICD-10-CM

## 2025-04-03 DIAGNOSIS — I12.0 TYPE 2 DM WITH HYPERTENSION AND ESRD ON DIALYSIS (HCC): ICD-10-CM

## 2025-04-03 DIAGNOSIS — Z79.4 TYPE 2 DIABETES MELLITUS WITH DIABETIC POLYNEUROPATHY, WITH LONG-TERM CURRENT USE OF INSULIN (HCC): ICD-10-CM

## 2025-04-03 DIAGNOSIS — Z12.31 SCREENING MAMMOGRAM FOR BREAST CANCER: ICD-10-CM

## 2025-04-03 DIAGNOSIS — I31.2 HEMORRHAGIC PERICARDIAL EFFUSION: ICD-10-CM

## 2025-04-03 DIAGNOSIS — F41.0 GENERALIZED ANXIETY DISORDER WITH PANIC ATTACKS: ICD-10-CM

## 2025-04-03 DIAGNOSIS — Z99.2 TYPE 2 DM WITH HYPERTENSION AND ESRD ON DIALYSIS (HCC): ICD-10-CM

## 2025-04-03 DIAGNOSIS — E11.42 TYPE 2 DIABETES MELLITUS WITH DIABETIC POLYNEUROPATHY, WITH LONG-TERM CURRENT USE OF INSULIN (HCC): ICD-10-CM

## 2025-04-03 DIAGNOSIS — Z09 HOSPITAL DISCHARGE FOLLOW-UP: Primary | ICD-10-CM

## 2025-04-03 DIAGNOSIS — G43.E09 CHRONIC MIGRAINE WITH AURA WITHOUT STATUS MIGRAINOSUS, NOT INTRACTABLE: ICD-10-CM

## 2025-04-03 PROCEDURE — 3079F DIAST BP 80-89 MM HG: CPT | Performed by: FAMILY MEDICINE

## 2025-04-03 PROCEDURE — G8420 CALC BMI NORM PARAMETERS: HCPCS | Performed by: FAMILY MEDICINE

## 2025-04-03 PROCEDURE — 2022F DILAT RTA XM EVC RTNOPTHY: CPT | Performed by: FAMILY MEDICINE

## 2025-04-03 PROCEDURE — 3044F HG A1C LEVEL LT 7.0%: CPT | Performed by: FAMILY MEDICINE

## 2025-04-03 PROCEDURE — G8427 DOCREV CUR MEDS BY ELIG CLIN: HCPCS | Performed by: FAMILY MEDICINE

## 2025-04-03 PROCEDURE — 99214 OFFICE O/P EST MOD 30 MIN: CPT | Performed by: FAMILY MEDICINE

## 2025-04-03 PROCEDURE — 3023F SPIROM DOC REV: CPT | Performed by: FAMILY MEDICINE

## 2025-04-03 PROCEDURE — 1111F DSCHRG MED/CURRENT MED MERGE: CPT | Performed by: FAMILY MEDICINE

## 2025-04-03 PROCEDURE — 4004F PT TOBACCO SCREEN RCVD TLK: CPT | Performed by: FAMILY MEDICINE

## 2025-04-03 PROCEDURE — 3075F SYST BP GE 130 - 139MM HG: CPT | Performed by: FAMILY MEDICINE

## 2025-04-03 RX ORDER — DESVENLAFAXINE 25 MG/1
25 TABLET, EXTENDED RELEASE ORAL DAILY
Qty: 90 TABLET | Refills: 1 | Status: SHIPPED | OUTPATIENT
Start: 2025-04-03

## 2025-04-03 RX ORDER — CLONIDINE HYDROCHLORIDE 0.2 MG/1
0.2 TABLET ORAL 3 TIMES DAILY
Qty: 270 TABLET | Refills: 1 | Status: SHIPPED | OUTPATIENT
Start: 2025-04-03

## 2025-04-03 RX ORDER — UMECLIDINIUM BROMIDE AND VILANTEROL TRIFENATATE 62.5; 25 UG/1; UG/1
1 POWDER RESPIRATORY (INHALATION) DAILY
Qty: 60 EACH | Refills: 5 | Status: SHIPPED | OUTPATIENT
Start: 2025-04-03

## 2025-04-03 RX ORDER — BUSPIRONE HYDROCHLORIDE 10 MG/1
10 TABLET ORAL 2 TIMES DAILY
Qty: 180 TABLET | Refills: 1 | Status: SHIPPED | OUTPATIENT
Start: 2025-04-03

## 2025-04-03 RX ORDER — PREGABALIN 75 MG/1
75 CAPSULE ORAL DAILY
Qty: 90 CAPSULE | Refills: 0 | Status: SHIPPED | OUTPATIENT
Start: 2025-04-03 | End: 2025-07-02

## 2025-04-03 RX ORDER — ALBUTEROL SULFATE 90 UG/1
2 INHALANT RESPIRATORY (INHALATION) EVERY 6 HOURS PRN
Qty: 18 G | Refills: 5 | Status: SHIPPED | OUTPATIENT
Start: 2025-04-03

## 2025-04-03 ASSESSMENT — ENCOUNTER SYMPTOMS
CHEST TIGHTNESS: 1
CONSTIPATION: 0
SHORTNESS OF BREATH: 1
WHEEZING: 1
DIARRHEA: 0
VOMITING: 0
ABDOMINAL PAIN: 0
BLOOD IN STOOL: 0
ABDOMINAL DISTENTION: 0
NAUSEA: 0
SINUS PRESSURE: 0
TROUBLE SWALLOWING: 0
BACK PAIN: 0
RHINORRHEA: 0
COUGH: 0

## 2025-04-08 LAB
ACID FAST STN SPEC QL: NORMAL
MYCOBACTERIUM SPEC CULT: NORMAL

## 2025-04-09 ENCOUNTER — TELEPHONE (OUTPATIENT)
Dept: SURGERY | Age: 50
End: 2025-04-09

## 2025-04-09 NOTE — TELEPHONE ENCOUNTER
Patient called and canceled her appointment for imaging rescheduled for 6/26/25 bilateral dx mammogram and rt u/s . No vm set up unable to leave a message .     Please see below of previous office note.       Fidencio, Yolis DE PAZ seen this patient inpatient, spoke to Dr. Brennan. Patient is scheduled for imaging 4/8/25 bilateral Dx, rt limited u/s : possible bx. Will schedule after imaging and possible bx.      Patient needs NP intake completed. Called patient no answer, vm not set up.         Thanks, Yolis           1.  Enlarged right breast with peau d'orange appearance of the skin:     -This is suspicious for breast malignancy  -According to patient this has been going on for last couple of months or even longer     -Patient will need outpatient evaluation by breast surgeon, mammogram and ultrasound as well as biopsy.     -CT abdomen and pelvis 3/20/2025 did not show any evidence of obvious metastatic disease.     -Plan of management would be outpatient referral to breast surgeon.  I spoke to Dr. Alvina Brennan about the case.  -She will also need breast imaging studies-mammogram, ultrasound which will be done as outpatient.      Electronically signed by Yolis Garcia LPN at 4/2/2025  9:50 AM  Electronically signed by Yolis Garcia LPN at 4/2/2025  9:53 AM

## 2025-04-14 LAB
FUNGUS SPEC CULT: NORMAL
LOEFFLER MB STN SPEC: NORMAL

## 2025-04-15 ENCOUNTER — HOSPITAL ENCOUNTER (OUTPATIENT)
Dept: ULTRASOUND IMAGING | Age: 50
Discharge: HOME | End: 2025-04-15
Payer: COMMERCIAL

## 2025-04-15 ENCOUNTER — HOSPITAL ENCOUNTER (OUTPATIENT)
Dept: WOMENS IMAGING | Age: 50
Discharge: HOME | End: 2025-04-15
Payer: COMMERCIAL

## 2025-04-15 VITALS — BODY MASS INDEX: 23.54 KG/M2 | WEIGHT: 150 LBS | HEIGHT: 67 IN

## 2025-04-15 DIAGNOSIS — N63.0 MASS OF BREAST, UNSPECIFIED LATERALITY: ICD-10-CM

## 2025-04-15 DIAGNOSIS — N63.0 BREAST MASS IN FEMALE: ICD-10-CM

## 2025-04-15 DIAGNOSIS — R92.8 ABNORMAL MAMMOGRAM: ICD-10-CM

## 2025-04-15 LAB
ACID FAST STN SPEC QL: NORMAL
MYCOBACTERIUM SPEC CULT: NORMAL

## 2025-04-15 PROCEDURE — 76642 ULTRASOUND BREAST LIMITED: CPT

## 2025-04-15 PROCEDURE — G0279 TOMOSYNTHESIS, MAMMO: HCPCS

## 2025-04-17 ENCOUNTER — TELEPHONE (OUTPATIENT)
Dept: SURGERY | Age: 50
End: 2025-04-17

## 2025-04-17 NOTE — TELEPHONE ENCOUNTER
Left a voicemail for patient to return call to answer intake questions to be scheduled to see Dr. Brennan. Please send to me if she returns call.

## 2025-04-21 LAB
FUNGUS SPEC CULT: NORMAL
LOEFFLER MB STN SPEC: NORMAL

## 2025-04-22 LAB
ACID FAST STN SPEC QL: NORMAL
MYCOBACTERIUM SPEC CULT: NORMAL

## 2025-04-23 ENCOUNTER — TELEPHONE (OUTPATIENT)
Dept: SURGERY | Age: 50
End: 2025-04-23

## 2025-04-23 NOTE — TELEPHONE ENCOUNTER
Patient is currently not a patient yet. I'm not allowed to give her any medical advice until she is a patient. I will let her know she can notify Dr. Haney.  Patient isn't scheduled to see Dr. Brennan until 5/1/25.       Thanks, Yolis

## 2025-04-23 NOTE — TELEPHONE ENCOUNTER
Patient calling concerned about breast pain and is wanting advise on what to do for the pain if anything. States it's a solid 6 out of 10. Patient is scheduled with Dr Brennan in May. Please call patient back at [phone]

## 2025-04-28 NOTE — PROGRESS NOTES
CC: Right breast swelling    HPI: Ms. Ramirez is a 49 y.o. woman with multiple comorbid conditions who presents today for evaluation of right breast swelling.  She has known renal failure and has had a right upper extremity AV fistula placed in the past.  She has had complications related to the fistula and other catheters.  She describes clotted off catheters in the past that led to chest and upper extremity swelling.  She also describes a history of abdominal swelling/ascites for which she underwent paracentesis.  She describes a history of cardiac effusion and other states of volume overload.  She reports for approximately the past year she has noticed extreme swelling of the right breast.  There has been a slight color change as well.  She presents today for evaluation with punch biopsy.  She is underwent diagnostic mammography and ultrasound which uncovered no suspicious findings.  She has no family history of breast or ovarian cancer.    She presents to the surgical office today in initial consultation to discuss her recent breast concerns.  She was referred by Dr. Haney.       INTERVAL HISTORY:  On 4/15/2025 she underwent bilateral diagnostic breast imaging.  There is diffuse bilateral skin thickening with more asymmetry and increased involvement of the lateral right breast.  This corresponds to the area of clinical concern.  There are no suspicious focal mass.  Volume overload can be considered but also suspicious for malignancy.  BI-RADS 4.      Review of Systems    Past Medical History:   Diagnosis Date    Acute respiratory failure due to COVID-19 ([facility]) 10/16/2021    Arterial ischemic stroke, ICA, left, acute ([facility])     Blind in both eyes     Cerebral artery occlusion with cerebral infarction (HCC)     CHF (congestive heart failure) (HCC)     Chronic kidney disease     COPD (chronic obstructive pulmonary disease) (HCC)     Depression     Diabetes mellitus out of control     Diabetes mellitus, type II  Verified Results  XR SHOULDER RT 3V [id number] 04:17PM KARAN RETANA   Ordering Provider: KARAN RETANA.    Reason For Study: x,x.   [Jul 27, 2017 4:24PM KARAN RETANA]  Let patient know: normal; noncontrast MRI RIGHT shoulder without contrast ordered as discussed with patient during clinic visit - task sent to  - patient may call central scheduling to schedule this at her earliest convenience.   [Jul 27, 2017 3:13PM Graciela Kaye]  order given to pt and faxed to [facility]     Test Name Result Flag Reference   XR SHOULDER RT 3V (Report)     Accession #    [id number]    Right shoulder, 3 views.    INDICATION: Pain.    IMPRESSION: Normal alignment of the right glenohumeral joint without significant degenerative   changes. No evidence of fracture.    **** F I N A L ****    Transcribed By: KERRI   07/27/17 4:14 pm    Dictated By:      CHAI GREEN DO    Electronically Reviewed and Approved By:      CHAI GREEN DO 07/27/17 4:15 pm

## 2025-04-29 LAB
ACID FAST STN SPEC QL: NORMAL
MYCOBACTERIUM SPEC CULT: NORMAL

## 2025-05-01 ENCOUNTER — INITIAL CONSULT (OUTPATIENT)
Dept: SURGERY | Age: 50
End: 2025-05-01

## 2025-05-01 VITALS
SYSTOLIC BLOOD PRESSURE: 139 MMHG | HEIGHT: 67 IN | WEIGHT: 154 LBS | BODY MASS INDEX: 24.17 KG/M2 | OXYGEN SATURATION: 95 % | DIASTOLIC BLOOD PRESSURE: 85 MMHG

## 2025-05-01 DIAGNOSIS — N64.59 ABNORMAL BREAST EXAM: Primary | ICD-10-CM

## 2025-05-01 DIAGNOSIS — N63.0 BREAST SWELLING: ICD-10-CM

## 2025-05-01 RX ORDER — LIDOCAINE HYDROCHLORIDE 10 MG/ML
3 INJECTION, SOLUTION INFILTRATION; PERINEURAL ONCE
Status: COMPLETED | OUTPATIENT
Start: 2025-05-01 | End: 2025-05-01

## 2025-05-01 RX ADMIN — LIDOCAINE HYDROCHLORIDE 6 ML: 10 INJECTION, SOLUTION INFILTRATION; PERINEURAL at 16:01

## 2025-05-06 ENCOUNTER — RESULTS FOLLOW-UP (OUTPATIENT)
Dept: SURGERY | Age: 50
End: 2025-05-06

## 2025-05-06 LAB
ACID FAST STN SPEC QL: NORMAL
MYCOBACTERIUM SPEC CULT: NORMAL

## 2025-05-13 ENCOUNTER — CLINICAL SUPPORT (OUTPATIENT)
Dept: SURGERY | Age: 50
End: 2025-05-13

## 2025-05-13 VITALS
OXYGEN SATURATION: 92 % | SYSTOLIC BLOOD PRESSURE: 139 MMHG | WEIGHT: 150 LBS | HEIGHT: 67 IN | DIASTOLIC BLOOD PRESSURE: 82 MMHG | HEART RATE: 68 BPM | BODY MASS INDEX: 23.54 KG/M2

## 2025-05-13 DIAGNOSIS — Z48.02 VISIT FOR SUTURE REMOVAL: Primary | ICD-10-CM

## 2025-05-13 NOTE — PROGRESS NOTES
Kristine Ramirez (:  1975) is a 49 y.o. female,Established patient, here for evaluation of the following chief complaint(s):  Suture / Staple Removal        Patient here today for suture removal . Sutures removed with out complications. Dry dressing covered per patients request. Dr. Brennan performed punch biopsy on  25. Pathology reviewed copy of pathology given to patient. Encouraged to call me with any questions or concerns. MRI was again offered to patient. Patient declined at this time.      rKistine Ramirez  YOB: 1975  [id number]     Pre-operative Diagnosis: Right breast swelling     Post-operative Diagnosis: Same     Procedure: right breast 5 mm x 2 and 6mm punch biopsy of the lateral right breast     Anesthesia: Local using 10 mL of 1% lidocaine with epinephrine     Surgeons/Assistants: Dr. Radha Brennan     Estimated Blood Loss: 5 mL     Complications: none apparant     Specimens: breast tissue     Findings:  Significant weeping of the soft tissue     Description:  The indications for the planned procedure, along with the potential benefits and risks were reviewed.  All questions were answered and she agreed to proceed.     The patient was placed in the supine position.  The right breast was prepped and draped in the normal sterile fashion using betadine solution. A time out procedure was performed.  Local anesthesia using 1% lidocaine with epinephrine was injected at the skin as well as the deeper subcutaneous tissues extending in a trajectory toward the lesion.  A 6 mm punch biopsy was taken from an irregular portion of the lateral right breast.  The specimen was placed in formalin at 1527 and labeled as right breast inferior.  Following this 2 additional biopsies were taken in a similar fashion.  They are both also placed in formalin at 1527 and labeled right breast middle and superior.  Following this the wounds were closed with a 4-0 Monocryl suture in an

## 2025-05-27 DIAGNOSIS — F41.0 GENERALIZED ANXIETY DISORDER WITH PANIC ATTACKS: ICD-10-CM

## 2025-05-27 DIAGNOSIS — F19.20 CONTROLLED DRUG DEPENDENCE (HCC): ICD-10-CM

## 2025-05-27 DIAGNOSIS — Z02.89 MEDICATION MANAGEMENT CONTRACT AGREEMENT: ICD-10-CM

## 2025-05-27 DIAGNOSIS — F41.1 GENERALIZED ANXIETY DISORDER WITH PANIC ATTACKS: ICD-10-CM

## 2025-05-27 DIAGNOSIS — F13.20 BENZODIAZEPINE DEPENDENCE, CONTINUOUS (HCC): ICD-10-CM

## 2025-05-27 RX ORDER — ALPRAZOLAM 2 MG/1
2 TABLET, EXTENDED RELEASE ORAL EVERY MORNING
Qty: 90 TABLET | Refills: 0 | Status: SHIPPED | OUTPATIENT
Start: 2025-05-27 | End: 2025-08-25

## 2025-05-27 NOTE — TELEPHONE ENCOUNTER
PDMP monitoring:  -No significant or noteworthy irregularities noted.   -Last report:   Last PDMP [place] as Reviewed ([place]):  Review User Review Instant Review Result   DAMON JAIMES 5/27/2025 12:33 PM Reviewed PDMP [1]     Rx sent.    Damon Jaimes MD  Family Medicine  [facility]

## 2025-06-03 ENCOUNTER — TELEPHONE (OUTPATIENT)
Dept: ADMINISTRATIVE | Age: 50
End: 2025-06-03

## 2025-06-03 NOTE — TELEPHONE ENCOUNTER
Submitted PA for ALPRAZolam ER 2MG er tablets  [facility]: [id number]  STATUS: PENDING.    Follow up done daily; if no decision with in three days we will refax.  If another three days goes by with no decision will call the insurance for status.

## 2025-06-04 NOTE — TELEPHONE ENCOUNTER
The medication ALPRAZolam ER 2MG er tablets is APPROVED from 06/03/25 to 06/03/26; letter attached.    Please notify the patient.    If this requires a response please respond to the pool ( P MHCX P[facility] MEDICATION PRE-AUTH).

## 2025-06-05 ENCOUNTER — TELEPHONE (OUTPATIENT)
Dept: FAMILY MEDICINE CLINIC | Age: 50
End: 2025-06-05

## 2025-06-05 DIAGNOSIS — R60.0 BILATERAL LEG EDEMA: ICD-10-CM

## 2025-06-05 DIAGNOSIS — R60.0 BILATERAL LEG EDEMA: Primary | ICD-10-CM

## 2025-06-05 RX ORDER — TORSEMIDE 20 MG/1
40 TABLET ORAL DAILY
Qty: 180 TABLET | Refills: 3 | Status: SHIPPED | OUTPATIENT
Start: 2025-06-05

## 2025-06-05 NOTE — TELEPHONE ENCOUNTER
Called patient she stated that her nephrologist stated it was ok to take the torsemide and she is currently taking 40 mg.

## 2025-06-05 NOTE — TELEPHONE ENCOUNTER
Tito from [facility] called about the    Disp Refills Start End    Torsemide 40 MG TABS 90 tablet 3 6/5/2025 --    Sig - Route: Take 40 mg by mouth every morning (before breakfast) - Oral      The only 40 mg is name brand but pt's insurance won't cover it so they want a new prescription of 20mg and to have it be 2 20's for the prescription    [id number] - [place] [address] - P [phone] - F [phone]  [facility] [address], [facility] [address]  Phone: [phone]  Fax: [phone]

## 2025-06-05 NOTE — TELEPHONE ENCOUNTER
Rx sent.    Damon Mireles MD  Family Medicine  [facility]

## 2025-06-05 NOTE — TELEPHONE ENCOUNTER
Updated rx sent.    Damon Mireles MD  Family Medicine  [facility]

## 2025-06-05 NOTE — TELEPHONE ENCOUNTER
I received a rx for Torsemide. She may want to verify with her nephrologist if it's okay to be on Torsemide and what dose. Use of Torsemide could worsen her renal function and she's on dialysis.    Damon Mireles MD  Family Medicine  [facility]

## 2025-06-09 ENCOUNTER — HOSPITAL ENCOUNTER (EMERGENCY)
Age: 50
Discharge: HOME | End: 2025-06-09
Attending: EMERGENCY MEDICINE
Payer: COMMERCIAL

## 2025-06-09 ENCOUNTER — APPOINTMENT (OUTPATIENT)
Dept: GENERAL RADIOLOGY | Age: 50
End: 2025-06-09
Payer: COMMERCIAL

## 2025-06-09 VITALS
OXYGEN SATURATION: 93 % | BODY MASS INDEX: 24.57 KG/M2 | HEART RATE: 100 BPM | SYSTOLIC BLOOD PRESSURE: 163 MMHG | RESPIRATION RATE: 15 BRPM | HEIGHT: 67 IN | WEIGHT: 156.53 LBS | TEMPERATURE: 97.8 F | DIASTOLIC BLOOD PRESSURE: 93 MMHG

## 2025-06-09 DIAGNOSIS — S20.211A RIB CONTUSION, RIGHT, INITIAL ENCOUNTER: Primary | ICD-10-CM

## 2025-06-09 DIAGNOSIS — S39.012A BACK STRAIN, INITIAL ENCOUNTER: ICD-10-CM

## 2025-06-09 LAB
ALBUMIN SERPL-MCNC: 3.2 G/DL (ref 3.4–5)
ALBUMIN/GLOB SERPL: 1.2 {RATIO} (ref 1.1–2.2)
ALP SERPL-CCNC: 144 U/L (ref 40–129)
ALT SERPL-CCNC: <5 U/L (ref 10–40)
ANION GAP SERPL CALCULATED.3IONS-SCNC: 20 MMOL/L (ref 3–16)
AST SERPL-CCNC: 12 U/L (ref 15–37)
BASE EXCESS BLDV CALC-SCNC: -0.7 MMOL/L (ref -3–3)
BASOPHILS # BLD: 0.1 K/UL (ref 0–0.2)
BASOPHILS NFR BLD: 1 %
BILIRUB SERPL-MCNC: 0.7 MG/DL (ref 0–1)
BUN SERPL-MCNC: 75 MG/DL (ref 7–20)
CALCIUM SERPL-MCNC: 6.5 MG/DL (ref 8.3–10.6)
CHLORIDE SERPL-SCNC: 96 MMOL/L (ref 99–110)
CK SERPL-CCNC: 82 U/L (ref 26–192)
CO2 BLDV-SCNC: 61 MMOL/L
CO2 SERPL-SCNC: 20 MMOL/L (ref 21–32)
COHGB MFR BLDV: 3.8 % (ref 0–1.5)
CREAT SERPL-MCNC: 6.9 MG/DL (ref 0.6–1.1)
DEPRECATED RDW RBC AUTO: 17.7 % (ref 12.4–15.4)
DO-HGB MFR BLDV: 8 %
EOSINOPHIL # BLD: 0.1 K/UL (ref 0–0.6)
EOSINOPHIL NFR BLD: 1.2 %
GFR SERPLBLD CREATININE-BSD FMLA CKD-EPI: 7 ML/MIN/{1.73_M2}
GLUCOSE SERPL-MCNC: 123 MG/DL (ref 70–99)
HCO3 BLDV-SCNC: 25.6 MMOL/L (ref 23–29)
HCT VFR BLD AUTO: 35.7 % (ref 36–48)
HGB BLD-MCNC: 12 G/DL (ref 12–16)
LYMPHOCYTES # BLD: 0.8 K/UL (ref 1–5.1)
LYMPHOCYTES NFR BLD: 8.3 %
MAGNESIUM SERPL-MCNC: 1.58 MG/DL (ref 1.8–2.4)
MCH RBC QN AUTO: 30.2 PG (ref 26–34)
MCHC RBC AUTO-ENTMCNC: 33.6 G/DL (ref 31–36)
MCV RBC AUTO: 90.1 FL (ref 80–100)
METHGB MFR BLDV: 0.8 %
MONOCYTES # BLD: 0.5 K/UL (ref 0–1.3)
MONOCYTES NFR BLD: 5.4 %
NEUTROPHILS # BLD: 7.7 K/UL (ref 1.7–7.7)
NEUTROPHILS NFR BLD: 84.1 %
O2 CT VFR BLDV CALC: 15 VOL %
O2 THERAPY: ABNORMAL
PCO2 BLDV: 48.1 MMHG (ref 40–50)
PH BLDV: 7.33 [PH] (ref 7.35–7.45)
PLATELET # BLD AUTO: 201 K/UL (ref 135–450)
PMV BLD AUTO: 8.3 FL (ref 5–10.5)
PO2 BLDV: 72.2 MMHG (ref 25–40)
POTASSIUM SERPL-SCNC: 4.4 MMOL/L (ref 3.5–5.1)
PROT SERPL-MCNC: 5.8 G/DL (ref 6.4–8.2)
RBC # BLD AUTO: 3.96 M/UL (ref 4–5.2)
SAO2 % BLDV: 92 %
SODIUM SERPL-SCNC: 136 MMOL/L (ref 136–145)
WBC # BLD AUTO: 9.2 K/UL (ref 4–11)

## 2025-06-09 PROCEDURE — 71101 X-RAY EXAM UNILAT RIBS/CHEST: CPT

## 2025-06-09 PROCEDURE — 82550 ASSAY OF CK (CPK): CPT

## 2025-06-09 PROCEDURE — 85025 COMPLETE CBC W/AUTO DIFF WBC: CPT

## 2025-06-09 PROCEDURE — 72100 X-RAY EXAM L-S SPINE 2/3 VWS: CPT

## 2025-06-09 PROCEDURE — 6370000000 HC RX 637 (ALT 250 FOR IP): Performed by: EMERGENCY MEDICINE

## 2025-06-09 PROCEDURE — 36415 COLL VENOUS BLD VENIPUNCTURE: CPT

## 2025-06-09 PROCEDURE — 82803 BLOOD GASES ANY COMBINATION: CPT

## 2025-06-09 PROCEDURE — 99284 EMERGENCY DEPT VISIT MOD MDM: CPT

## 2025-06-09 PROCEDURE — 83735 ASSAY OF MAGNESIUM: CPT

## 2025-06-09 PROCEDURE — 80053 COMPREHEN METABOLIC PANEL: CPT

## 2025-06-09 RX ORDER — OXYCODONE AND ACETAMINOPHEN 5; 325 MG/1; MG/1
1 TABLET ORAL ONCE
Refills: 0 | Status: COMPLETED | OUTPATIENT
Start: 2025-06-09 | End: 2025-06-09

## 2025-06-09 RX ADMIN — OXYCODONE AND ACETAMINOPHEN 1 TABLET: 325; 5 TABLET ORAL at 09:19

## 2025-06-09 ASSESSMENT — PAIN SCALES - GENERAL
PAINLEVEL_OUTOF10: 0
PAINLEVEL_OUTOF10: 5

## 2025-06-09 ASSESSMENT — PAIN DESCRIPTION - ORIENTATION: ORIENTATION: MID

## 2025-06-09 ASSESSMENT — PAIN - FUNCTIONAL ASSESSMENT: PAIN_FUNCTIONAL_ASSESSMENT: 0-10

## 2025-06-09 ASSESSMENT — PAIN DESCRIPTION - DESCRIPTORS: DESCRIPTORS: DISCOMFORT

## 2025-06-09 ASSESSMENT — PAIN DESCRIPTION - LOCATION: LOCATION: BACK

## 2025-06-11 NOTE — ED PROVIDER NOTES
Emergency Department Provider Note  Location: [facility] EMERGENCY DEPARTMENT  6/9/2025     Patient Identification  Kristine Ramirez is a 49 y.o. female    Chief Complaint  Fall (Pt arrived by squad, slid out of bed, hx CVA rt side weakness at baseline, HD pt due today for HD, c/o mid back pain )      Mode of Arrival  EMS    HPI  (History provided by patient)  This is a 49 y.o. female with a PMH significant for CVA with residual right sided weakness presented today for fall.  Patient says she was trying to get out of bed this morning but she slid out of bed.  Due to residual right-sided weakness from prior CVA, she had difficulty getting up on her own.  Her  also could not get her up so they called 911 for lift assist.  Patient is the EMS recommended that she come to the hospital get evaluated.  Patient denies any hip pain or leg pain.  She said her back scraped against the edge of her bed so she has pain in the right rib and mid lower back.  She otherwise denies pain anywhere else.  She did not hit her head.  She denies headache.  She denies neck pain    No other complaints, modifying factors or associated symptoms.    ROS  No syncope  No nausea vomit  No abdominal pain  No chest pain  Pertinent positive and negative as above    I have reviewed the following nursing documentation:  Allergies:   Allergies   Allergen Reactions    Amoxicillin Hives, Itching and Other (See Comments)     Tolerates cephalosporins  Patient tolerating cefazolin (ANCEF) as of October 11, 2018      Atorvastatin Diarrhea    Levofloxacin Anaphylaxis    Levofloxacin Anaphylaxis and Other (See Comments)    Vancomycin Anaphylaxis, Hives and Shortness Of Breath    Dilaudid [Hydromorphone] Other (See Comments)     Itching without hives. No anaphylaxis.     Tape [Adhesive Tape] Other (See Comments)     Paper tape turns skin bright red. Plastic tape okay.        Past medical history:  has a past medical history of Acute respiratory

## 2025-06-12 ENCOUNTER — APPOINTMENT (OUTPATIENT)
Dept: CT IMAGING | Age: 50
DRG: 314 | End: 2025-06-12
Payer: COMMERCIAL

## 2025-06-12 ENCOUNTER — APPOINTMENT (OUTPATIENT)
Dept: GENERAL RADIOLOGY | Age: 50
DRG: 314 | End: 2025-06-12
Payer: COMMERCIAL

## 2025-06-12 ENCOUNTER — HOSPITAL ENCOUNTER (INPATIENT)
Age: 50
LOS: 2 days | Discharge: HOME | DRG: 314 | End: 2025-06-17
Attending: STUDENT IN AN ORGANIZED HEALTH CARE EDUCATION/TRAINING PROGRAM | Admitting: HOSPITALIST
Payer: COMMERCIAL

## 2025-06-12 DIAGNOSIS — Z86.73 HISTORY OF STROKE: ICD-10-CM

## 2025-06-12 DIAGNOSIS — Z99.2 ESRD ON DIALYSIS (HCC): ICD-10-CM

## 2025-06-12 DIAGNOSIS — R29.6 FREQUENT FALLS: ICD-10-CM

## 2025-06-12 DIAGNOSIS — N18.6 ESRD ON DIALYSIS (HCC): ICD-10-CM

## 2025-06-12 DIAGNOSIS — E87.70 HYPERVOLEMIA, UNSPECIFIED HYPERVOLEMIA TYPE: Primary | ICD-10-CM

## 2025-06-12 LAB
ALBUMIN SERPL-MCNC: 3.9 G/DL (ref 3.4–5)
ALBUMIN/GLOB SERPL: 1.1 {RATIO} (ref 1.1–2.2)
ALP SERPL-CCNC: 168 U/L (ref 40–129)
ALT SERPL-CCNC: <5 U/L (ref 10–40)
ANION GAP SERPL CALCULATED.3IONS-SCNC: 22 MMOL/L (ref 3–16)
AST SERPL-CCNC: 16 U/L (ref 15–37)
BASE EXCESS BLDV CALC-SCNC: 1.6 MMOL/L (ref -3–3)
BASOPHILS # BLD: 0 K/UL (ref 0–0.2)
BASOPHILS NFR BLD: 0.5 %
BILIRUB SERPL-MCNC: 0.9 MG/DL (ref 0–1)
BUN SERPL-MCNC: 65 MG/DL (ref 7–20)
CALCIUM SERPL-MCNC: 8.5 MG/DL (ref 8.3–10.6)
CHLORIDE SERPL-SCNC: 88 MMOL/L (ref 99–110)
CO2 BLDV-SCNC: 65 MMOL/L
CO2 SERPL-SCNC: 24 MMOL/L (ref 21–32)
COHGB MFR BLDV: 4 % (ref 0–1.5)
CREAT SERPL-MCNC: 7.1 MG/DL (ref 0.6–1.1)
DEPRECATED RDW RBC AUTO: 18 % (ref 12.4–15.4)
DO-HGB MFR BLDV: 5 %
EOSINOPHIL # BLD: 0.1 K/UL (ref 0–0.6)
EOSINOPHIL NFR BLD: 1.5 %
GFR SERPLBLD CREATININE-BSD FMLA CKD-EPI: 7 ML/MIN/{1.73_M2}
GLUCOSE SERPL-MCNC: 143 MG/DL (ref 70–99)
HCO3 BLDV-SCNC: 27.3 MMOL/L (ref 23–29)
HCT VFR BLD AUTO: 34.8 % (ref 36–48)
HGB BLD-MCNC: 11.4 G/DL (ref 12–16)
LYMPHOCYTES # BLD: 0.8 K/UL (ref 1–5.1)
LYMPHOCYTES NFR BLD: 11.1 %
MAGNESIUM SERPL-MCNC: 1.9 MG/DL (ref 1.8–2.4)
MCH RBC QN AUTO: 30.1 PG (ref 26–34)
MCHC RBC AUTO-ENTMCNC: 32.8 G/DL (ref 31–36)
MCV RBC AUTO: 92 FL (ref 80–100)
METHGB MFR BLDV: 0.9 %
MONOCYTES # BLD: 0.6 K/UL (ref 0–1.3)
MONOCYTES NFR BLD: 7.8 %
NEUTROPHILS # BLD: 5.7 K/UL (ref 1.7–7.7)
NEUTROPHILS NFR BLD: 79.1 %
NT-PROBNP SERPL-MCNC: ABNORMAL PG/ML (ref 0–124)
O2 CT VFR BLDV CALC: 15 VOL %
O2 THERAPY: ABNORMAL
PCO2 BLDV: 46.8 MMHG (ref 40–50)
PH BLDV: 7.38 [PH] (ref 7.35–7.45)
PLATELET # BLD AUTO: 180 K/UL (ref 135–450)
PMV BLD AUTO: 8.3 FL (ref 5–10.5)
PO2 BLDV: 87.1 MMHG (ref 25–40)
POTASSIUM SERPL-SCNC: 5.1 MMOL/L (ref 3.5–5.1)
PROT SERPL-MCNC: 7.5 G/DL (ref 6.4–8.2)
RBC # BLD AUTO: 3.78 M/UL (ref 4–5.2)
SAO2 % BLDV: 95 %
SODIUM SERPL-SCNC: 134 MMOL/L (ref 136–145)
TROPONIN, HIGH SENSITIVITY: 399 NG/L (ref 0–14)
TROPONIN, HIGH SENSITIVITY: 464 NG/L (ref 0–14)
TSH SERPL DL<=0.005 MIU/L-ACNC: 5.41 UIU/ML (ref 0.27–4.2)
WBC # BLD AUTO: 7.2 K/UL (ref 4–11)

## 2025-06-12 PROCEDURE — 84443 ASSAY THYROID STIM HORMONE: CPT

## 2025-06-12 PROCEDURE — 83735 ASSAY OF MAGNESIUM: CPT

## 2025-06-12 PROCEDURE — 84436 ASSAY OF TOTAL THYROXINE: CPT

## 2025-06-12 PROCEDURE — 84484 ASSAY OF TROPONIN QUANT: CPT

## 2025-06-12 PROCEDURE — 70450 CT HEAD/BRAIN W/O DYE: CPT

## 2025-06-12 PROCEDURE — 72125 CT NECK SPINE W/O DYE: CPT

## 2025-06-12 PROCEDURE — G0378 HOSPITAL OBSERVATION PER HR: HCPCS

## 2025-06-12 PROCEDURE — 80053 COMPREHEN METABOLIC PANEL: CPT

## 2025-06-12 PROCEDURE — 93005 ELECTROCARDIOGRAM TRACING: CPT | Performed by: STUDENT IN AN ORGANIZED HEALTH CARE EDUCATION/TRAINING PROGRAM

## 2025-06-12 PROCEDURE — 83880 ASSAY OF NATRIURETIC PEPTIDE: CPT

## 2025-06-12 PROCEDURE — 82803 BLOOD GASES ANY COMBINATION: CPT

## 2025-06-12 PROCEDURE — 85025 COMPLETE CBC W/AUTO DIFF WBC: CPT

## 2025-06-12 PROCEDURE — 84481 FREE ASSAY (FT-3): CPT

## 2025-06-12 PROCEDURE — 99285 EMERGENCY DEPT VISIT HI MDM: CPT

## 2025-06-12 PROCEDURE — 71045 X-RAY EXAM CHEST 1 VIEW: CPT

## 2025-06-12 ASSESSMENT — PAIN - FUNCTIONAL ASSESSMENT: PAIN_FUNCTIONAL_ASSESSMENT: 0-10

## 2025-06-12 ASSESSMENT — PAIN SCALES - GENERAL: PAINLEVEL_OUTOF10: 6

## 2025-06-12 NOTE — ED PROVIDER NOTES
[facility] EMERGENCY DEPARTMENT  EMERGENCY DEPARTMENT ENCOUNTER      Pt Name: Kristine Ramirez  MRN: [id number]  Birthdate 1975  Date of evaluation: 6/12/2025  Provider: Vinicius Stafford MD    CHIEF COMPLAINT       Chief Complaint   Patient presents with    Fall     Pt came in from home via [facility] ems, pt reports two mechanical falls out of bed today, pt reports hitting head, pt denies blood thinner use, pt a dialysis pt and missed appointment yesterday         HISTORY OF PRESENT ILLNESS   (Location/Symptom, Timing/Onset, Context/Setting, Quality, Duration, Modifying Factors, Severity)  Note limiting factors.   Kristine Ramirez is a 49 y.o. female who presents to the emergency department for 2 mechanical falls out of bed today.  Patient reports that she slid out of bed both times that she tried to get up and walk.  She hit her head both times.  She states that she remembers the event, did not pass out.  She denies any chest pain or trouble breathing.  She believes last dialysis was on Monday.  She is not anticoagulated.  She lives at home.  She was also seen in the emergency room yesterday for a fall, had difficulty getting up on her own and  needed to call lift assist.      Chart reviewed: Patient has a history of CVA with residual right sided weakness, CHF, COPD, diabetes.  Is not anticoagulated.  Nursing Notes were reviewed.    REVIEW OF SYSTEMS    (2-9 systems for level 4, 10 or more for level 5)     Review of Systems    Except as noted above the remainder of the review of systems was reviewed and negative.       PAST MEDICAL HISTORY     Past Medical History:   Diagnosis Date    Acute respiratory failure due to COVID-19 ([facility]) 10/16/2021    Arterial ischemic stroke, ICA, left, acute ([facility])     Blind in both eyes     Cerebral artery occlusion with cerebral infarction ([facility])     CHF (congestive heart failure) ([facility])     Chronic kidney disease     COPD (chronic obstructive

## 2025-06-13 ENCOUNTER — APPOINTMENT (OUTPATIENT)
Dept: INTERVENTIONAL RADIOLOGY/VASCULAR | Age: 50
DRG: 314 | End: 2025-06-13
Payer: COMMERCIAL

## 2025-06-13 ENCOUNTER — APPOINTMENT (OUTPATIENT)
Dept: VASCULAR LAB | Age: 50
DRG: 314 | End: 2025-06-13
Payer: COMMERCIAL

## 2025-06-13 LAB
ALBUMIN SERPL-MCNC: 3.8 G/DL (ref 3.4–5)
ANION GAP SERPL CALCULATED.3IONS-SCNC: 21 MMOL/L (ref 3–16)
BASOPHILS # BLD: 0 K/UL (ref 0–0.2)
BASOPHILS NFR BLD: 0.6 %
BUN SERPL-MCNC: 68 MG/DL (ref 7–20)
CALCIUM SERPL-MCNC: 8.2 MG/DL (ref 8.3–10.6)
CHLORIDE SERPL-SCNC: 88 MMOL/L (ref 99–110)
CO2 SERPL-SCNC: 24 MMOL/L (ref 21–32)
CREAT SERPL-MCNC: 7.6 MG/DL (ref 0.6–1.1)
DEPRECATED RDW RBC AUTO: 18.1 % (ref 12.4–15.4)
EKG ATRIAL RATE: 112 BPM
EKG DIAGNOSIS: NORMAL
EKG P AXIS: 31 DEGREES
EKG P-R INTERVAL: 156 MS
EKG Q-T INTERVAL: 316 MS
EKG QRS DURATION: 82 MS
EKG QTC CALCULATION (BAZETT): 431 MS
EKG R AXIS: 16 DEGREES
EKG T AXIS: 137 DEGREES
EKG VENTRICULAR RATE: 112 BPM
EOSINOPHIL # BLD: 0.1 K/UL (ref 0–0.6)
EOSINOPHIL NFR BLD: 1.8 %
GFR SERPLBLD CREATININE-BSD FMLA CKD-EPI: 6 ML/MIN/{1.73_M2}
GLUCOSE BLD-MCNC: 139 MG/DL (ref 70–99)
GLUCOSE SERPL-MCNC: 132 MG/DL (ref 70–99)
HCT VFR BLD AUTO: 34.6 % (ref 36–48)
HGB BLD-MCNC: 11.3 G/DL (ref 12–16)
LYMPHOCYTES # BLD: 1 K/UL (ref 1–5.1)
LYMPHOCYTES NFR BLD: 18.3 %
MCH RBC QN AUTO: 29.7 PG (ref 26–34)
MCHC RBC AUTO-ENTMCNC: 32.8 G/DL (ref 31–36)
MCV RBC AUTO: 90.7 FL (ref 80–100)
MONOCYTES # BLD: 0.5 K/UL (ref 0–1.3)
MONOCYTES NFR BLD: 9.2 %
NEUTROPHILS # BLD: 3.8 K/UL (ref 1.7–7.7)
NEUTROPHILS NFR BLD: 70.1 %
PERFORMED ON: ABNORMAL
PHOSPHATE SERPL-MCNC: 9.2 MG/DL (ref 2.5–4.9)
PLATELET # BLD AUTO: 158 K/UL (ref 135–450)
PMV BLD AUTO: 8.1 FL (ref 5–10.5)
POTASSIUM SERPL-SCNC: 4.9 MMOL/L (ref 3.5–5.1)
RBC # BLD AUTO: 3.82 M/UL (ref 4–5.2)
SODIUM SERPL-SCNC: 133 MMOL/L (ref 136–145)
T3FREE SERPL-MCNC: 1.6 PG/ML (ref 2.3–4.2)
T4 SERPL-MCNC: 6 UG/DL (ref 4.5–10.9)
WBC # BLD AUTO: 5.4 K/UL (ref 4–11)

## 2025-06-13 PROCEDURE — 94640 AIRWAY INHALATION TREATMENT: CPT

## 2025-06-13 PROCEDURE — 97166 OT EVAL MOD COMPLEX 45 MIN: CPT

## 2025-06-13 PROCEDURE — C1750 CATH, HEMODIALYSIS,LONG-TERM: HCPCS

## 2025-06-13 PROCEDURE — 6360000002 HC RX W HCPCS: Performed by: STUDENT IN AN ORGANIZED HEALTH CARE EDUCATION/TRAINING PROGRAM

## 2025-06-13 PROCEDURE — 5A1D70Z PERFORMANCE OF URINARY FILTRATION, INTERMITTENT, LESS THAN 6 HOURS PER DAY: ICD-10-PCS | Performed by: INTERNAL MEDICINE

## 2025-06-13 PROCEDURE — 76937 US GUIDE VASCULAR ACCESS: CPT

## 2025-06-13 PROCEDURE — 6370000000 HC RX 637 (ALT 250 FOR IP): Performed by: PHYSICIAN ASSISTANT

## 2025-06-13 PROCEDURE — 97530 THERAPEUTIC ACTIVITIES: CPT

## 2025-06-13 PROCEDURE — 77001 FLUOROGUIDE FOR VEIN DEVICE: CPT

## 2025-06-13 PROCEDURE — 6360000002 HC RX W HCPCS: Performed by: PHYSICIAN ASSISTANT

## 2025-06-13 PROCEDURE — 90935 HEMODIALYSIS ONE EVALUATION: CPT

## 2025-06-13 PROCEDURE — 99152 MOD SED SAME PHYS/QHP 5/>YRS: CPT

## 2025-06-13 PROCEDURE — 36415 COLL VENOUS BLD VENIPUNCTURE: CPT

## 2025-06-13 PROCEDURE — 2700000000 HC OXYGEN THERAPY PER DAY

## 2025-06-13 PROCEDURE — G0378 HOSPITAL OBSERVATION PER HR: HCPCS

## 2025-06-13 PROCEDURE — 02HV33Z INSERTION OF INFUSION DEVICE INTO SUPERIOR VENA CAVA, PERCUTANEOUS APPROACH: ICD-10-PCS | Performed by: INTERNAL MEDICINE

## 2025-06-13 PROCEDURE — 2500000003 HC RX 250 WO HCPCS: Performed by: INTERNAL MEDICINE

## 2025-06-13 PROCEDURE — 96374 THER/PROPH/DIAG INJ IV PUSH: CPT

## 2025-06-13 PROCEDURE — 0JH63XZ INSERTION OF TUNNELED VASCULAR ACCESS DEVICE INTO CHEST SUBCUTANEOUS TISSUE AND FASCIA, PERCUTANEOUS APPROACH: ICD-10-PCS | Performed by: INTERNAL MEDICINE

## 2025-06-13 PROCEDURE — 99221 1ST HOSP IP/OBS SF/LOW 40: CPT | Performed by: NURSE PRACTITIONER

## 2025-06-13 PROCEDURE — 93010 ELECTROCARDIOGRAM REPORT: CPT | Performed by: INTERNAL MEDICINE

## 2025-06-13 PROCEDURE — 2500000003 HC RX 250 WO HCPCS: Performed by: PHYSICIAN ASSISTANT

## 2025-06-13 PROCEDURE — 6360000002 HC RX W HCPCS: Performed by: INTERNAL MEDICINE

## 2025-06-13 PROCEDURE — 94761 N-INVAS EAR/PLS OXIMETRY MLT: CPT

## 2025-06-13 PROCEDURE — 97162 PT EVAL MOD COMPLEX 30 MIN: CPT

## 2025-06-13 PROCEDURE — 85025 COMPLETE CBC W/AUTO DIFF WBC: CPT

## 2025-06-13 PROCEDURE — 36558 INSERT TUNNELED CV CATH: CPT

## 2025-06-13 PROCEDURE — 96372 THER/PROPH/DIAG INJ SC/IM: CPT

## 2025-06-13 PROCEDURE — 80069 RENAL FUNCTION PANEL: CPT

## 2025-06-13 RX ORDER — AMLODIPINE BESYLATE 5 MG/1
5 TABLET ORAL DAILY
Status: DISCONTINUED | OUTPATIENT
Start: 2025-06-13 | End: 2025-06-17

## 2025-06-13 RX ORDER — LIDOCAINE HYDROCHLORIDE AND EPINEPHRINE 10; 10 MG/ML; UG/ML
7.5 INJECTION, SOLUTION INFILTRATION; PERINEURAL ONCE
Status: COMPLETED | OUTPATIENT
Start: 2025-06-13 | End: 2025-06-13

## 2025-06-13 RX ORDER — SEVELAMER CARBONATE 0.8 G/1
2.4 POWDER, FOR SUSPENSION ORAL
Status: DISCONTINUED | OUTPATIENT
Start: 2025-06-13 | End: 2025-06-17 | Stop reason: HOSPADM

## 2025-06-13 RX ORDER — ALBUTEROL SULFATE 0.83 MG/ML
2.5 SOLUTION RESPIRATORY (INHALATION) EVERY 4 HOURS PRN
Status: DISCONTINUED | OUTPATIENT
Start: 2025-06-13 | End: 2025-06-17 | Stop reason: HOSPADM

## 2025-06-13 RX ORDER — VENLAFAXINE HYDROCHLORIDE 37.5 MG/1
37.5 CAPSULE, EXTENDED RELEASE ORAL
Status: DISCONTINUED | OUTPATIENT
Start: 2025-06-13 | End: 2025-06-17 | Stop reason: HOSPADM

## 2025-06-13 RX ORDER — ACETAMINOPHEN 650 MG/1
650 SUPPOSITORY RECTAL EVERY 6 HOURS PRN
Status: DISCONTINUED | OUTPATIENT
Start: 2025-06-13 | End: 2025-06-17 | Stop reason: HOSPADM

## 2025-06-13 RX ORDER — PANTOPRAZOLE SODIUM 40 MG/1
40 TABLET, DELAYED RELEASE ORAL
Status: DISCONTINUED | OUTPATIENT
Start: 2025-06-13 | End: 2025-06-17 | Stop reason: HOSPADM

## 2025-06-13 RX ORDER — PREGABALIN 75 MG/1
75 CAPSULE ORAL DAILY
Status: DISCONTINUED | OUTPATIENT
Start: 2025-06-13 | End: 2025-06-17 | Stop reason: HOSPADM

## 2025-06-13 RX ORDER — TORSEMIDE 20 MG/1
40 TABLET ORAL DAILY
Status: DISCONTINUED | OUTPATIENT
Start: 2025-06-13 | End: 2025-06-17 | Stop reason: HOSPADM

## 2025-06-13 RX ORDER — SODIUM CHLORIDE 9 MG/ML
INJECTION, SOLUTION INTRAVENOUS PRN
Status: DISCONTINUED | OUTPATIENT
Start: 2025-06-13 | End: 2025-06-17 | Stop reason: HOSPADM

## 2025-06-13 RX ORDER — SODIUM CHLORIDE 0.9 % (FLUSH) 0.9 %
5-40 SYRINGE (ML) INJECTION PRN
Status: DISCONTINUED | OUTPATIENT
Start: 2025-06-13 | End: 2025-06-17 | Stop reason: HOSPADM

## 2025-06-13 RX ORDER — BUSPIRONE HYDROCHLORIDE 5 MG/1
10 TABLET ORAL 2 TIMES DAILY
Status: DISCONTINUED | OUTPATIENT
Start: 2025-06-13 | End: 2025-06-17 | Stop reason: HOSPADM

## 2025-06-13 RX ORDER — CLONIDINE HYDROCHLORIDE 0.1 MG/1
0.2 TABLET ORAL 3 TIMES DAILY
Status: DISCONTINUED | OUTPATIENT
Start: 2025-06-13 | End: 2025-06-17 | Stop reason: HOSPADM

## 2025-06-13 RX ORDER — ALPRAZOLAM 0.5 MG
0.5 TABLET ORAL EVERY 6 HOURS SCHEDULED
Status: DISCONTINUED | OUTPATIENT
Start: 2025-06-13 | End: 2025-06-17 | Stop reason: HOSPADM

## 2025-06-13 RX ORDER — MIDAZOLAM HYDROCHLORIDE 1 MG/ML
INJECTION, SOLUTION INTRAMUSCULAR; INTRAVENOUS PRN
Status: COMPLETED | OUTPATIENT
Start: 2025-06-13 | End: 2025-06-13

## 2025-06-13 RX ORDER — HEPARIN SODIUM 5000 [USP'U]/ML
5000 INJECTION, SOLUTION INTRAVENOUS; SUBCUTANEOUS EVERY 8 HOURS SCHEDULED
Status: DISCONTINUED | OUTPATIENT
Start: 2025-06-13 | End: 2025-06-17 | Stop reason: HOSPADM

## 2025-06-13 RX ORDER — ONDANSETRON 2 MG/ML
4 INJECTION INTRAMUSCULAR; INTRAVENOUS EVERY 6 HOURS PRN
Status: DISCONTINUED | OUTPATIENT
Start: 2025-06-13 | End: 2025-06-17 | Stop reason: HOSPADM

## 2025-06-13 RX ORDER — ASPIRIN 81 MG/1
81 TABLET ORAL DAILY
Status: DISCONTINUED | OUTPATIENT
Start: 2025-06-13 | End: 2025-06-17 | Stop reason: HOSPADM

## 2025-06-13 RX ORDER — SODIUM CHLORIDE 0.9 % (FLUSH) 0.9 %
5-40 SYRINGE (ML) INJECTION EVERY 12 HOURS SCHEDULED
Status: DISCONTINUED | OUTPATIENT
Start: 2025-06-13 | End: 2025-06-17 | Stop reason: HOSPADM

## 2025-06-13 RX ORDER — SENNOSIDES 8.6 MG/1
1 TABLET ORAL DAILY PRN
Status: DISCONTINUED | OUTPATIENT
Start: 2025-06-13 | End: 2025-06-17 | Stop reason: HOSPADM

## 2025-06-13 RX ORDER — ACETAMINOPHEN 325 MG/1
650 TABLET ORAL EVERY 6 HOURS PRN
Status: DISCONTINUED | OUTPATIENT
Start: 2025-06-13 | End: 2025-06-17 | Stop reason: HOSPADM

## 2025-06-13 RX ORDER — HEPARIN SODIUM 5000 [USP'U]/ML
6000 INJECTION, SOLUTION INTRAVENOUS; SUBCUTANEOUS ONCE
Status: COMPLETED | OUTPATIENT
Start: 2025-06-13 | End: 2025-06-13

## 2025-06-13 RX ORDER — FENTANYL CITRATE 50 UG/ML
INJECTION, SOLUTION INTRAMUSCULAR; INTRAVENOUS PRN
Status: COMPLETED | OUTPATIENT
Start: 2025-06-13 | End: 2025-06-13

## 2025-06-13 RX ORDER — LIDOCAINE HYDROCHLORIDE 10 MG/ML
10 INJECTION, SOLUTION EPIDURAL; INFILTRATION; INTRACAUDAL; PERINEURAL ONCE
Status: COMPLETED | OUTPATIENT
Start: 2025-06-13 | End: 2025-06-13

## 2025-06-13 RX ADMIN — HEPARIN SODIUM 3200 UNITS: 5000 INJECTION INTRAVENOUS; SUBCUTANEOUS at 15:40

## 2025-06-13 RX ADMIN — MIDAZOLAM 0.5 MG: 1 INJECTION INTRAMUSCULAR; INTRAVENOUS at 15:32

## 2025-06-13 RX ADMIN — ONDANSETRON 4 MG: 2 INJECTION INTRAMUSCULAR; INTRAVENOUS at 16:35

## 2025-06-13 RX ADMIN — ALPRAZOLAM 0.5 MG: 0.5 TABLET ORAL at 02:20

## 2025-06-13 RX ADMIN — WATER 2000 MG: 1 INJECTION INTRAMUSCULAR; INTRAVENOUS; SUBCUTANEOUS at 15:21

## 2025-06-13 RX ADMIN — CLONIDINE HYDROCHLORIDE 0.2 MG: 0.1 TABLET ORAL at 16:09

## 2025-06-13 RX ADMIN — TORSEMIDE 40 MG: 20 TABLET ORAL at 09:05

## 2025-06-13 RX ADMIN — ALPRAZOLAM 0.5 MG: 0.5 TABLET ORAL at 09:05

## 2025-06-13 RX ADMIN — BUSPIRONE HYDROCHLORIDE 10 MG: 5 TABLET ORAL at 02:20

## 2025-06-13 RX ADMIN — LIDOCAINE HYDROCHLORIDE 10 ML: 10 INJECTION, SOLUTION EPIDURAL; INFILTRATION; INTRACAUDAL; PERINEURAL at 15:40

## 2025-06-13 RX ADMIN — CLONIDINE HYDROCHLORIDE 0.2 MG: 0.1 TABLET ORAL at 21:34

## 2025-06-13 RX ADMIN — BUSPIRONE HYDROCHLORIDE 10 MG: 5 TABLET ORAL at 09:05

## 2025-06-13 RX ADMIN — CLONIDINE HYDROCHLORIDE 0.2 MG: 0.1 TABLET ORAL at 02:20

## 2025-06-13 RX ADMIN — FENTANYL CITRATE 25 MCG: 50 INJECTION, SOLUTION INTRAMUSCULAR; INTRAVENOUS at 15:24

## 2025-06-13 RX ADMIN — PANTOPRAZOLE SODIUM 40 MG: 40 TABLET, DELAYED RELEASE ORAL at 16:09

## 2025-06-13 RX ADMIN — ASPIRIN 81 MG: 81 TABLET, DELAYED RELEASE ORAL at 09:05

## 2025-06-13 RX ADMIN — Medication 10 ML: at 21:35

## 2025-06-13 RX ADMIN — LIDOCAINE HYDROCHLORIDE,EPINEPHRINE BITARTRATE 7.5 ML: 10; .01 INJECTION, SOLUTION INFILTRATION; PERINEURAL at 15:41

## 2025-06-13 RX ADMIN — CLONIDINE HYDROCHLORIDE 0.2 MG: 0.1 TABLET ORAL at 09:05

## 2025-06-13 RX ADMIN — HEPARIN SODIUM 5000 UNITS: 5000 INJECTION INTRAVENOUS; SUBCUTANEOUS at 05:28

## 2025-06-13 RX ADMIN — PANTOPRAZOLE SODIUM 40 MG: 40 TABLET, DELAYED RELEASE ORAL at 05:28

## 2025-06-13 RX ADMIN — VENLAFAXINE HYDROCHLORIDE 37.5 MG: 37.5 CAPSULE, EXTENDED RELEASE ORAL at 09:05

## 2025-06-13 RX ADMIN — MIDAZOLAM 0.5 MG: 1 INJECTION INTRAMUSCULAR; INTRAVENOUS at 15:24

## 2025-06-13 RX ADMIN — HEPARIN SODIUM 5000 UNITS: 5000 INJECTION INTRAVENOUS; SUBCUTANEOUS at 21:35

## 2025-06-13 RX ADMIN — ALPRAZOLAM 0.5 MG: 0.5 TABLET ORAL at 16:09

## 2025-06-13 RX ADMIN — ALPRAZOLAM 0.5 MG: 0.5 TABLET ORAL at 21:35

## 2025-06-13 RX ADMIN — Medication 10 ML: at 09:08

## 2025-06-13 RX ADMIN — BUSPIRONE HYDROCHLORIDE 10 MG: 5 TABLET ORAL at 21:35

## 2025-06-13 RX ADMIN — FENTANYL CITRATE 25 MCG: 50 INJECTION, SOLUTION INTRAMUSCULAR; INTRAVENOUS at 15:32

## 2025-06-13 RX ADMIN — PREGABALIN 75 MG: 75 CAPSULE ORAL at 09:05

## 2025-06-13 RX ADMIN — SEVELAMER CARBONATE FOR ORAL SUSPENSION 2.4 G: 800 POWDER, FOR SUSPENSION ORAL at 09:09

## 2025-06-13 RX ADMIN — TIOTROPIUM BROMIDE AND OLODATEROL 2 PUFF: 3.124; 2.736 SPRAY, METERED RESPIRATORY (INHALATION) at 10:17

## 2025-06-13 RX ADMIN — AMLODIPINE BESYLATE 5 MG: 5 TABLET ORAL at 09:05

## 2025-06-13 ASSESSMENT — PAIN SCALES - GENERAL
PAINLEVEL_OUTOF10: 0

## 2025-06-13 ASSESSMENT — PAIN DESCRIPTION - LOCATION: LOCATION: ABDOMEN

## 2025-06-13 NOTE — ED NOTES
Patient Name: Kristine Ramirez  : 1975 49 y.o.  MRN: [id number]  ED Room #: ED-0002/     Chief complaint:   Chief Complaint   Patient presents with    Fall     Pt came in from home via [facility] ems, pt reports two mechanical falls out of bed today, pt reports hitting head, pt denies blood thinner use, pt a dialysis pt and missed appointment yesterday     Hospital Problem/Diagnosis:   Hospital Problems           Last Modified POA    * (Principal) Hypervolemia 2025 Yes         O2 Flow Rate:O2 Device: Nasal cannula O2 Flow Rate (L/min): 2 L/min (if applicable)  Cardiac Rhythm:   (if applicable)  Active LDA's:           How does patient ambulate? Unknown, did not assess in the Emergency Department    2. How does patient take pills? Unknown, no oral medications were given in the Emergency Department    3. Is patient alert? Drowsy, but responds to voice    4. Is patient oriented? To Person, To Place, To Time, and To Situation    5.   Patient arrived from:  home  Facility Name: ___________________________________________    6. If patient is disoriented or from a Skill Nursing Facility has family been notified of admission? No    7. Patient belongings? Belongings: Cell Phone and Clothing    Disposition of belongings? Kept with Patient     8. Any specific patient or family belongings/needs/dynamics?   a. N/a    9. Miscellaneous comments/pending orders?  a. N/a      If there are any additional questions please reach out to the Emergency Department.

## 2025-06-13 NOTE — CONSULTS
[facility]  Consultation Note    Reason for Consult: ESRD management, shortness of breath  Requesting Physician:  Dr. NATALIYA Barriga    CHIEF COMPLAINT: Fall    History obtained from records and patient.    HISTORY OF PRESENT ILLNESS:                 Kristine Ramirez is a 49 y.o., female with significant past medical history of ESRD, on HD MWF at [facility] under our care, CVA, CHF, pericardial effusion, diabetes mellitus type 2, depression, hypertension, migraine headaches, hyperlipidemia who presents with fall and hitting her head.  She missed her dialysis last Wednesday.  I am asked to see the patient with regards to her HD need.  SpO2 was down to 87% on room air yesterday.  Chest x-ray showed pulmonary edema.  Potassium today of 4.9 and serum bicarbonate of 24.  Systolic blood pressure ranging from 150s to 170s range.  Attempted to cannulate right AV fistula today but multiple clots and unable to run HD.      Past Medical History:     has a past medical history of Acute respiratory failure due to COVID-19 ([facility]), Arterial ischemic stroke, ICA, left, acute ([facility]), Blind in both eyes, Cerebral artery occlusion with cerebral infarction ([facility]), CHF (congestive heart failure) ([facility]), Chronic kidney disease, COPD (chronic obstructive pulmonary disease) ([facility]), Depression, Diabetes mellitus out of control, Diabetes mellitus, type II ([facility]), Diabetic neuropathy associated with type 2 diabetes mellitus ([facility]), Generalized headaches, Hypertension, Infertility, Insomnia, Migraine headache, Mixed hyperlipidemia, Otitis media, Pelvic abscess in female, Pneumonia, Stroke ([facility]), and Stroke ([facility]).   Past Surgical History:     has a past surgical history that includes Cervix surgery; eye surgery; Foot surgery (Right); Foot surgery (Bilateral); Foot surgery (Left); IR TUNNELED CVC PLACE WO SQ PORT/PUMP > 5 YEARS (9/7/2021); Dialysis fistula creation (Right, 2/10/2022); Upper gastrointestinal

## 2025-06-13 NOTE — CARE COORDINATION
Discharge Planning Note:    Chart reviewed and it appears that patient has minimal needs for discharge at this time. Risk Score n/a- OBS    Primary Care Physician is CIERRA JAIMES   Primary insurance is Medicare    Patient is from home.    Please notify case management if any discharge needs are identified.      Case management will continue to follow progress and update discharge plan as needed.    Electronically signed by SHANTA Dunn on 6/13/2025 at 9:15 AM

## 2025-06-13 NOTE — ACP (ADVANCE CARE PLANNING)
Advanced Care Planning Note    Purpose of Encounter: Advanced care planning in light of SOB  Parties In Attendance: Patient, Jeffy Barriga MD  Decisional Capacity: Yes  Subjective: Patient has fluid overload  Objective: Cr 7.6  Goals of Care Determination: Patient wants full support  Plan:  HD per nephro  Code Status: Full   Time spent on Advanced care Plannin minutes  Advanced Care Planning Documents: Completed advanced directives on chart, Kannan, is the Healthcare POA.    Jeffy Barriga MD  2025 1:30 PM

## 2025-06-13 NOTE — BRIEF OP NOTE
Brief Postoperative Note    Kristine Ramirez  YOB: 1975  [id number]    Pre-operative Diagnosis: ESRD    Post-operative Diagnosis: Same    Procedure: Tunneled HD catheter placement    Anesthesia: Local and Moderate Sedation    Surgeons/Assistants: Zia Rivas M.D.    Estimated Blood Loss: less than 50     Complications: None    Specimens: Was Not Obtained    Findings: Successful left IJ tunneled HD catheter placement. Ready for immediate use.    Electronically signed by Zia Rivas MD on 6/13/2025 at 3:51 PM

## 2025-06-13 NOTE — CONSULTS
[facility]  Consultation Note    Chief Complaint / Reason for Consultation  Malfunction AVF    History of Present Illness  Patient is a 49 y.o. female with pertinent hx of stroke with R sided weakness, CHF, CKD on HD, DM, HTN, HLD, neuropathy presented to  with complaints of multiple falls 2/2 to residual weakness from stroke. She was found to have pulmonary edema. Nephrology was consulted for dialysis through RUE AVF. Attempted cannulation of AVF resulted in multiple clots with inability to run dialysis. A TDC is ordered and vascular surgery was consulted for inspection of R AVF. The pt states she has had problems recently with the AVF requiring access center to balloon the fistula multiple times before she states there was a stent placed. She states there is plans to place a larger stent in the future. She states she was able to have a full run of HD on Monday of this week and was not able to access on Wednesday. She has hematoma on her RUE surrounding the distal graft that she states has been there for weeks now. She does have some paraesthesias to her fingers but states this is stable. She denies weakness.     Review of Systems   Denies fevers, chills, chest pain, shortness of breath, nausea, vomiting, hematemesis, diarrhea, constipation, melena, hematochezia, wt changes, vision problems, blindness, hearing problems, facial droop, slurred speech, extremity weakness, extremity numbness, dysuria.    Past Medical History:   has a past medical history of Acute respiratory failure due to COVID-19 ([facility]), Arterial ischemic stroke, ICA, left, acute ([facility]), Blind in both eyes, Cerebral artery occlusion with cerebral infarction ([facility]), CHF (congestive heart failure) ([facility]), Chronic kidney disease, COPD (chronic obstructive pulmonary disease) ([facility]), Depression, Diabetes mellitus out of control, Diabetes mellitus, type II ([facility]), Diabetic neuropathy associated with type 2 diabetes mellitus ([facility]),

## 2025-06-13 NOTE — H&P
Hospital Medicine History & Physical        Name:  Kristine Ramirez /Age/Sex: 1975  (49 y.o. female)   MRN & CSN:  [id number] & [id number] Encounter Date/Time: 2025 10:47 PM EDT   Location:  ED- PCP: Damon Mireles MD         CHIEF COMPLAINT:   Chief Complaint   Patient presents with    Fall     Pt came in from home via [facility] ems, pt reports two mechanical falls out of bed today, pt reports hitting head, pt denies blood thinner use, pt a dialysis pt and missed appointment yesterday         HISTORY OF PRESENT ILLNESS:      History from: patient  Limitations to history : None     Kristine Ramirez is a 49 y.o. female with history of stroke with residual right sided deficits who presented to ED for evaluation of 3 falls from bed in the last 24 hours.  Patient reports she has slipped out of bed each time and cannot get up due to chronic right-sided weakness.  Patient has ESRD on HD MWF.  Patient reports last dialysis was on .  Patient once hypoxic upon arrival with SpO2 87% on RA.  Patient was placed on 2L O2 with good response.  Patient not on O2 at baseline.  Patient reports some shortness of breath.  She denies fever, chest pain, cough, GI symptoms.  She denies head trauma, headache, neck pain or stiffness, changes in vision, difficulty swallowing, numbness/tingling extremities, new focal weakness.  She denies any other complaints or concerns at this time.    CXR obtained in ED notable for worsening pulmonary edema and moderate right sided pleural effusion consistent with fluid overload.  CT head and cervical spine negative for acute process.  Troponin 464 ? 399, stable for patient.  NT proBNP > 70,000.    REVIEW OF SYSTEMS:   Pertinent positives as noted in the HPI. All other systems reviewed and negative.      PHYSICAL EXAM PERFORMED:  BP (!) 146/81   Pulse (!) 103   Temp 97.7 °F (36.5 °C)   Resp 18   Ht 1.702 m (5' 7\")   Wt 70.8 kg (156 lb)   LMP

## 2025-06-13 NOTE — PRE SEDATION
umeclidinium-vilanterol (ANORO ELLIPTA) 62.5-25 MCG/ACT inhaler Inhale 1 puff into the lungs daily Yes Damon Mireles MD   cloNIDine (CATAPRES) 0.2 MG tablet Take 1 tablet by mouth in the morning, at noon, and at bedtime Yes Damon Mireles MD   pregabalin (LYRICA) 75 MG capsule Take 1 capsule by mouth daily for 90 days. Max Daily Amount: 75 mg Yes Damon Mireles MD   desvenlafaxine succinate (PRISTIQ) 25 MG TB24 extended release tablet Take 1 tablet by mouth daily Yes Damon Mireles MD   busPIRone (BUSPAR) 10 MG tablet Take 1 tablet by mouth 2 times daily Yes Damon Mireles MD   pantoprazole (PROTONIX) 40 MG tablet Take 1 tablet by mouth 2 times daily (before meals) Yes Jeffy Barriga MD   amLODIPine (NORVASC) 5 MG tablet Take 1 tablet by mouth daily Yes Niesha Steve MD   Dulaglutide 4.5 MG/0.5ML SOAJ Inject 4.5 mg into the skin every 7 days  Patient taking differently: Inject 4.5 mg into the skin every 7 days Fridays. Yes Damon Mireles MD   sevelamer (RENVELA) 2.4 g PACK packet Take 2.4 g by mouth 3 times daily (with meals) Yes Niesha Steve MD   aspirin 81 MG EC tablet Take 1 tablet by mouth daily Yes Merrick Alcala MD   Handicap Placard MISC by Does not apply route Expires on 5/18/2028  Damon Mireles MD       Recent relevant anticoagulation/antiplatelet therapy:  n/a    Pre-Sedation Documentation and Exam:   I have reviewed the patient's history and review of systems.  Vital signs (see above) and relevant labs have been reviewed (see below).  Lab Results   Component Value Date    INR 1.38 (H) 03/04/2025    PROTIME 17.2 (H) 03/04/2025     Lab Results   Component Value Date     06/13/2025        I have performed an immediate assessment prior to initiation of sedation. The patient is in no acute distress.  Lungs: clear, Cardiovascular: no murmurs, rubs, or gallops.    Mallampati Airway Assessment:  Mallampati Class III - (soft palate & base of uvula are

## 2025-06-14 ENCOUNTER — APPOINTMENT (OUTPATIENT)
Dept: VASCULAR LAB | Age: 50
DRG: 314 | End: 2025-06-14
Payer: COMMERCIAL

## 2025-06-14 LAB
ALBUMIN SERPL-MCNC: 3.5 G/DL (ref 3.4–5)
ANION GAP SERPL CALCULATED.3IONS-SCNC: 18 MMOL/L (ref 3–16)
BASOPHILS # BLD: 0 K/UL (ref 0–0.2)
BASOPHILS NFR BLD: 0.6 %
BUN SERPL-MCNC: 42 MG/DL (ref 7–20)
CALCIUM SERPL-MCNC: 8.6 MG/DL (ref 8.3–10.6)
CHLORIDE SERPL-SCNC: 93 MMOL/L (ref 99–110)
CO2 SERPL-SCNC: 23 MMOL/L (ref 21–32)
CREAT SERPL-MCNC: 5.7 MG/DL (ref 0.6–1.1)
DEPRECATED RDW RBC AUTO: 17.6 % (ref 12.4–15.4)
EOSINOPHIL # BLD: 0.1 K/UL (ref 0–0.6)
EOSINOPHIL NFR BLD: 1.6 %
GFR SERPLBLD CREATININE-BSD FMLA CKD-EPI: 9 ML/MIN/{1.73_M2}
GLUCOSE SERPL-MCNC: 123 MG/DL (ref 70–99)
HCT VFR BLD AUTO: 32.9 % (ref 36–48)
HGB BLD-MCNC: 10.7 G/DL (ref 12–16)
LYMPHOCYTES # BLD: 0.6 K/UL (ref 1–5.1)
LYMPHOCYTES NFR BLD: 10.8 %
MAGNESIUM SERPL-MCNC: 2 MG/DL (ref 1.8–2.4)
MCH RBC QN AUTO: 29.8 PG (ref 26–34)
MCHC RBC AUTO-ENTMCNC: 32.6 G/DL (ref 31–36)
MCV RBC AUTO: 91.5 FL (ref 80–100)
MONOCYTES # BLD: 0.5 K/UL (ref 0–1.3)
MONOCYTES NFR BLD: 10 %
NEUTROPHILS # BLD: 4 K/UL (ref 1.7–7.7)
NEUTROPHILS NFR BLD: 77 %
PHOSPHATE SERPL-MCNC: 7.7 MG/DL (ref 2.5–4.9)
PLATELET # BLD AUTO: 139 K/UL (ref 135–450)
PMV BLD AUTO: 8.1 FL (ref 5–10.5)
POTASSIUM SERPL-SCNC: 4.8 MMOL/L (ref 3.5–5.1)
RBC # BLD AUTO: 3.6 M/UL (ref 4–5.2)
SODIUM SERPL-SCNC: 134 MMOL/L (ref 136–145)
WBC # BLD AUTO: 5.3 K/UL (ref 4–11)

## 2025-06-14 PROCEDURE — 94760 N-INVAS EAR/PLS OXIMETRY 1: CPT

## 2025-06-14 PROCEDURE — G0378 HOSPITAL OBSERVATION PER HR: HCPCS

## 2025-06-14 PROCEDURE — 94640 AIRWAY INHALATION TREATMENT: CPT

## 2025-06-14 PROCEDURE — 2700000000 HC OXYGEN THERAPY PER DAY

## 2025-06-14 PROCEDURE — 96372 THER/PROPH/DIAG INJ SC/IM: CPT

## 2025-06-14 PROCEDURE — 36415 COLL VENOUS BLD VENIPUNCTURE: CPT

## 2025-06-14 PROCEDURE — 6370000000 HC RX 637 (ALT 250 FOR IP): Performed by: PHYSICIAN ASSISTANT

## 2025-06-14 PROCEDURE — 90935 HEMODIALYSIS ONE EVALUATION: CPT

## 2025-06-14 PROCEDURE — 2500000003 HC RX 250 WO HCPCS: Performed by: PHYSICIAN ASSISTANT

## 2025-06-14 PROCEDURE — 6360000002 HC RX W HCPCS: Performed by: PHYSICIAN ASSISTANT

## 2025-06-14 PROCEDURE — 93990 DOPPLER FLOW TESTING: CPT

## 2025-06-14 PROCEDURE — 80069 RENAL FUNCTION PANEL: CPT

## 2025-06-14 PROCEDURE — 83735 ASSAY OF MAGNESIUM: CPT

## 2025-06-14 PROCEDURE — 85025 COMPLETE CBC W/AUTO DIFF WBC: CPT

## 2025-06-14 RX ORDER — HEPARIN SODIUM 1000 [USP'U]/ML
INJECTION, SOLUTION INTRAVENOUS; SUBCUTANEOUS
Status: DISCONTINUED
Start: 2025-06-14 | End: 2025-06-14 | Stop reason: SDUPTHER

## 2025-06-14 RX ORDER — HEPARIN SODIUM 1000 [USP'U]/ML
3600 INJECTION, SOLUTION INTRAVENOUS; SUBCUTANEOUS PRN
Status: DISCONTINUED | OUTPATIENT
Start: 2025-06-14 | End: 2025-06-17 | Stop reason: HOSPADM

## 2025-06-14 RX ADMIN — ASPIRIN 81 MG: 81 TABLET, DELAYED RELEASE ORAL at 08:46

## 2025-06-14 RX ADMIN — ALPRAZOLAM 0.5 MG: 0.5 TABLET ORAL at 13:27

## 2025-06-14 RX ADMIN — PREGABALIN 75 MG: 75 CAPSULE ORAL at 08:45

## 2025-06-14 RX ADMIN — Medication 10 ML: at 08:47

## 2025-06-14 RX ADMIN — ALPRAZOLAM 0.5 MG: 0.5 TABLET ORAL at 08:46

## 2025-06-14 RX ADMIN — SEVELAMER CARBONATE FOR ORAL SUSPENSION 2.4 G: 800 POWDER, FOR SUSPENSION ORAL at 18:41

## 2025-06-14 RX ADMIN — BUSPIRONE HYDROCHLORIDE 10 MG: 5 TABLET ORAL at 08:45

## 2025-06-14 RX ADMIN — TIOTROPIUM BROMIDE AND OLODATEROL 2 PUFF: 3.124; 2.736 SPRAY, METERED RESPIRATORY (INHALATION) at 09:00

## 2025-06-14 RX ADMIN — HEPARIN SODIUM 5000 UNITS: 5000 INJECTION INTRAVENOUS; SUBCUTANEOUS at 21:24

## 2025-06-14 RX ADMIN — TORSEMIDE 40 MG: 20 TABLET ORAL at 08:45

## 2025-06-14 RX ADMIN — ACETAMINOPHEN 650 MG: 325 TABLET ORAL at 12:21

## 2025-06-14 RX ADMIN — ALPRAZOLAM 0.5 MG: 0.5 TABLET ORAL at 02:53

## 2025-06-14 RX ADMIN — AMLODIPINE BESYLATE 5 MG: 5 TABLET ORAL at 08:47

## 2025-06-14 RX ADMIN — CLONIDINE HYDROCHLORIDE 0.2 MG: 0.1 TABLET ORAL at 08:47

## 2025-06-14 RX ADMIN — BUSPIRONE HYDROCHLORIDE 10 MG: 5 TABLET ORAL at 21:24

## 2025-06-14 RX ADMIN — VENLAFAXINE HYDROCHLORIDE 37.5 MG: 37.5 CAPSULE, EXTENDED RELEASE ORAL at 08:46

## 2025-06-14 RX ADMIN — PANTOPRAZOLE SODIUM 40 MG: 40 TABLET, DELAYED RELEASE ORAL at 06:01

## 2025-06-14 RX ADMIN — Medication 10 ML: at 21:25

## 2025-06-14 RX ADMIN — SEVELAMER CARBONATE FOR ORAL SUSPENSION 2.4 G: 800 POWDER, FOR SUSPENSION ORAL at 08:55

## 2025-06-14 RX ADMIN — CLONIDINE HYDROCHLORIDE 0.2 MG: 0.1 TABLET ORAL at 21:24

## 2025-06-14 RX ADMIN — SEVELAMER CARBONATE FOR ORAL SUSPENSION 2.4 G: 800 POWDER, FOR SUSPENSION ORAL at 13:28

## 2025-06-14 RX ADMIN — ALPRAZOLAM 0.5 MG: 0.5 TABLET ORAL at 21:24

## 2025-06-14 ASSESSMENT — PAIN SCALES - GENERAL
PAINLEVEL_OUTOF10: 0
PAINLEVEL_OUTOF10: 5
PAINLEVEL_OUTOF10: 4
PAINLEVEL_OUTOF10: 0
PAINLEVEL_OUTOF10: 1

## 2025-06-14 ASSESSMENT — PAIN DESCRIPTION - LOCATION
LOCATION: CHEST
LOCATION: CHEST

## 2025-06-14 ASSESSMENT — PAIN DESCRIPTION - DESCRIPTORS: DESCRIPTORS: ACHING

## 2025-06-14 ASSESSMENT — PAIN DESCRIPTION - FREQUENCY: FREQUENCY: INTERMITTENT

## 2025-06-14 ASSESSMENT — PAIN DESCRIPTION - ONSET: ONSET: GRADUAL

## 2025-06-14 ASSESSMENT — PAIN DESCRIPTION - PAIN TYPE: TYPE: SURGICAL PAIN

## 2025-06-14 ASSESSMENT — PAIN DESCRIPTION - ORIENTATION: ORIENTATION: LEFT

## 2025-06-14 NOTE — PLAN OF CARE
Problem: Chronic Conditions and Co-morbidities  Goal: Patient's chronic conditions and co-morbidity symptoms are monitored and maintained or improved  Outcome: Progressing     Problem: Discharge Planning  Goal: Discharge to home or other facility with appropriate resources  Outcome: Progressing     Problem: Pain  Goal: Verbalizes/displays adequate comfort level or baseline comfort level  Outcome: Progressing  Flowsheets  Taken 6/13/2025 2200  Verbalizes/displays adequate comfort level or baseline comfort level: Assess pain using appropriate pain scale  Taken 6/13/2025 2130  Verbalizes/displays adequate comfort level or baseline comfort level: Assess pain using appropriate pain scale     Problem: Skin/Tissue Integrity  Goal: Skin integrity remains intact  Description: 1.  Monitor for areas of redness and/or skin breakdown2.  Assess vascular access sites hourly3.  Every 4-6 hours minimum:  Change oxygen saturation probe site4.  Every 4-6 hours:  If on nasal continuous positive airway pressure, respiratory therapy assess nares and determine need for appliance change or resting period  Outcome: Progressing

## 2025-06-14 NOTE — PLAN OF CARE
Problem: Chronic Conditions and Co-morbidities  Goal: Patient's chronic conditions and co-morbidity symptoms are monitored and maintained or improved  6/14/2025 1225 by Syeda Dewey RN  Outcome: Progressing  6/13/2025 2318 by Radha Etienne RN  Outcome: Progressing     Problem: Discharge Planning  Goal: Discharge to home or other facility with appropriate resources  6/14/2025 1225 by Syeda Dewey RN  Outcome: Progressing  6/13/2025 2318 by Radha Etienne RN  Outcome: Progressing     Problem: Pain  Goal: Verbalizes/displays adequate comfort level or baseline comfort level  6/14/2025 1225 by Syeda Dewey RN  Outcome: Progressing  Flowsheets (Taken 6/14/2025 0015 by Radha Etienne RN)  Verbalizes/displays adequate comfort level or baseline comfort level: Encourage patient to monitor pain and request assistance  6/13/2025 2318 by Radha Etienne RN  Outcome: Progressing  Flowsheets  Taken 6/13/2025 2200  Verbalizes/displays adequate comfort level or baseline comfort level: Assess pain using appropriate pain scale  Taken 6/13/2025 2130  Verbalizes/displays adequate comfort level or baseline comfort level: Assess pain using appropriate pain scale     Problem: Skin/Tissue Integrity  Goal: Skin integrity remains intact  Description: 1.  Monitor for areas of redness and/or skin breakdown2.  Assess vascular access sites hourly3.  Every 4-6 hours minimum:  Change oxygen saturation probe site4.  Every 4-6 hours:  If on nasal continuous positive airway pressure, respiratory therapy assess nares and determine need for appliance change or resting period  6/14/2025 1225 by Syeda Dewey RN  Outcome: Progressing  6/13/2025 2318 by Radha Etienne RN  Outcome: Progressing  Flowsheets (Taken 6/13/2025 2045)  Skin Integrity Remains Intact: Monitor for areas of redness and/or skin breakdown     Problem: Safety - Adult  Goal: Free from fall injury  Outcome: Progressing

## 2025-06-15 LAB
ALBUMIN SERPL-MCNC: 3.9 G/DL (ref 3.4–5)
ANION GAP SERPL CALCULATED.3IONS-SCNC: 17 MMOL/L (ref 3–16)
BASOPHILS # BLD: 0 K/UL (ref 0–0.2)
BASOPHILS NFR BLD: 0.4 %
BUN SERPL-MCNC: 24 MG/DL (ref 7–20)
CALCIUM SERPL-MCNC: 9.1 MG/DL (ref 8.3–10.6)
CHLORIDE SERPL-SCNC: 98 MMOL/L (ref 99–110)
CO2 SERPL-SCNC: 23 MMOL/L (ref 21–32)
CREAT SERPL-MCNC: 4 MG/DL (ref 0.6–1.1)
DEPRECATED RDW RBC AUTO: 17.5 % (ref 12.4–15.4)
ECHO BSA: 1.83 M2
EOSINOPHIL # BLD: 0.1 K/UL (ref 0–0.6)
EOSINOPHIL NFR BLD: 1.9 %
GFR SERPLBLD CREATININE-BSD FMLA CKD-EPI: 13 ML/MIN/{1.73_M2}
GLUCOSE SERPL-MCNC: 121 MG/DL (ref 70–99)
HCT VFR BLD AUTO: 33.9 % (ref 36–48)
HGB BLD-MCNC: 10.9 G/DL (ref 12–16)
LYMPHOCYTES # BLD: 0.6 K/UL (ref 1–5.1)
LYMPHOCYTES NFR BLD: 9.4 %
MAGNESIUM SERPL-MCNC: 2 MG/DL (ref 1.8–2.4)
MCH RBC QN AUTO: 29.9 PG (ref 26–34)
MCHC RBC AUTO-ENTMCNC: 32.3 G/DL (ref 31–36)
MCV RBC AUTO: 92.6 FL (ref 80–100)
MONOCYTES # BLD: 0.5 K/UL (ref 0–1.3)
MONOCYTES NFR BLD: 7.7 %
NEUTROPHILS # BLD: 4.9 K/UL (ref 1.7–7.7)
NEUTROPHILS NFR BLD: 80.6 %
PHOSPHATE SERPL-MCNC: 5.9 MG/DL (ref 2.5–4.9)
PLATELET # BLD AUTO: 129 K/UL (ref 135–450)
PMV BLD AUTO: 8 FL (ref 5–10.5)
POTASSIUM SERPL-SCNC: 4.4 MMOL/L (ref 3.5–5.1)
RBC # BLD AUTO: 3.67 M/UL (ref 4–5.2)
SODIUM SERPL-SCNC: 138 MMOL/L (ref 136–145)
VAS RIGHT ARTERIAL PROX ANASTOMOSIS AVF EDV: 236 CM/S
VAS RIGHT ARTERIAL PROX ANASTOMOSIS AVF PSV: 440 CM/S
VAS RIGHT AVF AVG DIAMETER 1: 1.76 CM
VAS RIGHT AVF AVG DIAMETER 2: 1.19 CM
VAS RIGHT AVF AVG DIAMETER 3: 1.19 CM
VAS RIGHT AVF AVG GRAFT NAME: NORMAL
VAS RIGHT AVF AVG MID OUTFLOW VOL FLOW: 1317 ML/MIN
VAS RIGHT AVF AVG OUTFLOW VESSEL NAME: NORMAL
VAS RIGHT AX A MID PSV: 576 CM/S
VAS RIGHT DIST OUTFLOW AVF EDV: 326 CM/S
VAS RIGHT DIST OUTFLOW AVF PSV: 576 CM/S
VAS RIGHT INFLOW ARTERY AVF EDV: 114 CM/S
VAS RIGHT INFLOW ARTERY AVF PSV: 199 CM/S
VAS RIGHT MID OUTFLOW AVF EDV: 71.9 CM/S
VAS RIGHT MID OUTFLOW AVF PSV: 119 CM/S
VAS RIGHT OUTFLOW VESSEL AVF PSV: 187 CM/S
VAS RIGHT PROX OUTFLOW AVF EDV: 56.2 CM/S
VAS RIGHT PROX OUTFLOW AVF PSV: 70.2 CM/S
VAS RIGHT VENOUS DIST ANASTOMOSIS AVF EDV: 121.6 CM/S
VAS RIGHT VENOUS DIST ANASTOMOSIS AVF PSV: 181 CM/S
WBC # BLD AUTO: 6 K/UL (ref 4–11)

## 2025-06-15 PROCEDURE — 96372 THER/PROPH/DIAG INJ SC/IM: CPT

## 2025-06-15 PROCEDURE — 94760 N-INVAS EAR/PLS OXIMETRY 1: CPT

## 2025-06-15 PROCEDURE — 6370000000 HC RX 637 (ALT 250 FOR IP): Performed by: PHYSICIAN ASSISTANT

## 2025-06-15 PROCEDURE — 36415 COLL VENOUS BLD VENIPUNCTURE: CPT

## 2025-06-15 PROCEDURE — 1200000000 HC SEMI PRIVATE

## 2025-06-15 PROCEDURE — 2700000000 HC OXYGEN THERAPY PER DAY

## 2025-06-15 PROCEDURE — 2500000003 HC RX 250 WO HCPCS: Performed by: PHYSICIAN ASSISTANT

## 2025-06-15 PROCEDURE — 93990 DOPPLER FLOW TESTING: CPT | Performed by: INTERNAL MEDICINE

## 2025-06-15 PROCEDURE — APPNB30 APP NON BILLABLE TIME 0-30 MINS: Performed by: CLINICAL NURSE SPECIALIST

## 2025-06-15 PROCEDURE — 94640 AIRWAY INHALATION TREATMENT: CPT

## 2025-06-15 PROCEDURE — 80069 RENAL FUNCTION PANEL: CPT

## 2025-06-15 PROCEDURE — 6360000002 HC RX W HCPCS: Performed by: PHYSICIAN ASSISTANT

## 2025-06-15 PROCEDURE — 85025 COMPLETE CBC W/AUTO DIFF WBC: CPT

## 2025-06-15 PROCEDURE — 83735 ASSAY OF MAGNESIUM: CPT

## 2025-06-15 RX ORDER — PANTOPRAZOLE SODIUM 40 MG/1
40 TABLET, DELAYED RELEASE ORAL
Qty: 30 TABLET | Refills: 3 | Status: SHIPPED | OUTPATIENT
Start: 2025-06-15

## 2025-06-15 RX ADMIN — AMLODIPINE BESYLATE 5 MG: 5 TABLET ORAL at 08:11

## 2025-06-15 RX ADMIN — CLONIDINE HYDROCHLORIDE 0.2 MG: 0.1 TABLET ORAL at 08:11

## 2025-06-15 RX ADMIN — ALPRAZOLAM 0.5 MG: 0.5 TABLET ORAL at 02:30

## 2025-06-15 RX ADMIN — BUSPIRONE HYDROCHLORIDE 10 MG: 5 TABLET ORAL at 08:10

## 2025-06-15 RX ADMIN — TORSEMIDE 40 MG: 20 TABLET ORAL at 08:10

## 2025-06-15 RX ADMIN — BUSPIRONE HYDROCHLORIDE 10 MG: 5 TABLET ORAL at 21:41

## 2025-06-15 RX ADMIN — PANTOPRAZOLE SODIUM 40 MG: 40 TABLET, DELAYED RELEASE ORAL at 05:14

## 2025-06-15 RX ADMIN — Medication 10 ML: at 21:42

## 2025-06-15 RX ADMIN — Medication 10 ML: at 08:18

## 2025-06-15 RX ADMIN — HEPARIN SODIUM 5000 UNITS: 5000 INJECTION INTRAVENOUS; SUBCUTANEOUS at 05:14

## 2025-06-15 RX ADMIN — CLONIDINE HYDROCHLORIDE 0.2 MG: 0.1 TABLET ORAL at 21:41

## 2025-06-15 RX ADMIN — CLONIDINE HYDROCHLORIDE 0.2 MG: 0.1 TABLET ORAL at 14:26

## 2025-06-15 RX ADMIN — PREGABALIN 75 MG: 75 CAPSULE ORAL at 08:11

## 2025-06-15 RX ADMIN — ALPRAZOLAM 0.5 MG: 0.5 TABLET ORAL at 08:11

## 2025-06-15 RX ADMIN — HEPARIN SODIUM 5000 UNITS: 5000 INJECTION INTRAVENOUS; SUBCUTANEOUS at 14:28

## 2025-06-15 RX ADMIN — ASPIRIN 81 MG: 81 TABLET, DELAYED RELEASE ORAL at 08:10

## 2025-06-15 RX ADMIN — ALPRAZOLAM 0.5 MG: 0.5 TABLET ORAL at 14:27

## 2025-06-15 RX ADMIN — PANTOPRAZOLE SODIUM 40 MG: 40 TABLET, DELAYED RELEASE ORAL at 16:48

## 2025-06-15 RX ADMIN — HEPARIN SODIUM 5000 UNITS: 5000 INJECTION INTRAVENOUS; SUBCUTANEOUS at 21:41

## 2025-06-15 RX ADMIN — TIOTROPIUM BROMIDE AND OLODATEROL 2 PUFF: 3.124; 2.736 SPRAY, METERED RESPIRATORY (INHALATION) at 11:49

## 2025-06-15 RX ADMIN — VENLAFAXINE HYDROCHLORIDE 37.5 MG: 37.5 CAPSULE, EXTENDED RELEASE ORAL at 08:11

## 2025-06-15 RX ADMIN — SEVELAMER CARBONATE FOR ORAL SUSPENSION 2.4 G: 800 POWDER, FOR SUSPENSION ORAL at 11:29

## 2025-06-15 RX ADMIN — SEVELAMER CARBONATE FOR ORAL SUSPENSION 2.4 G: 800 POWDER, FOR SUSPENSION ORAL at 16:48

## 2025-06-15 RX ADMIN — SEVELAMER CARBONATE FOR ORAL SUSPENSION 2.4 G: 800 POWDER, FOR SUSPENSION ORAL at 08:15

## 2025-06-15 RX ADMIN — ALPRAZOLAM 0.5 MG: 0.5 TABLET ORAL at 21:41

## 2025-06-15 ASSESSMENT — PAIN SCALES - GENERAL
PAINLEVEL_OUTOF10: 0

## 2025-06-15 NOTE — PLAN OF CARE
Problem: Chronic Conditions and Co-morbidities  Goal: Patient's chronic conditions and co-morbidity symptoms are monitored and maintained or improved  6/14/2025 2306 by Radha Etienne RN  Outcome: Progressing    Problem: Discharge Planning  Goal: Discharge to home or other facility with appropriate resources  6/14/2025 2306 by Radha Etienne RN  Outcome: Progressing     Problem: Pain  Goal: Verbalizes/displays adequate comfort level or baseline comfort level  6/14/2025 2306 by Radha Etienne RN  Outcome: Progressing  Flowsheets (Taken 6/14/2025 2115)  Verbalizes/displays adequate comfort level or baseline comfort level: Assess pain using appropriate pain scale     Problem: Skin/Tissue Integrity  Goal: Skin integrity remains intact  Description: 1.  Monitor for areas of redness and/or skin breakdown2.  Assess vascular access sites hourly3.  Every 4-6 hours minimum:  Change oxygen saturation probe site4.  Every 4-6 hours:  If on nasal continuous positive airway pressure, respiratory therapy assess nares and determine need for appliance change or resting period  6/14/2025 2306 by Radha Etienne RN  Outcome: Progressing       Problem: Safety - Adult  Goal: Free from fall injury  Recent Flowsheet Documentation  Taken 6/14/2025 2000 by Radha Etienne RN  Free From Fall Injury: Instruct family/caregiver on patient safety  Outcome: Progressing

## 2025-06-15 NOTE — DISCHARGE INSTRUCTIONS
Please call and make an appointment with your PCP within 1 week; If you do not have a PCP please make an appointment with the [facility] outpatient resident clinic by calling [phone]  Please call and make an appointment with Vascular Surgery, Nephrology  Please take all your medications as prescribed including any new ones on discharge

## 2025-06-15 NOTE — DISCHARGE INSTR - COC
Documentation and Therapy:  Wound 03/18/25 Toe (Comment  which one) Left;Anterior wound on inner second toe (Active)   Number of days: 88        Elimination:  Continence:   Bowel: yes  Bladder: No  Urinary Catheter: None   Colostomy/Ileostomy/Ileal Conduit: No       Date of Last BM: 6/14 per pt    Intake/Output Summary (Last 24 hours) at 6/15/2025 0951  Last data filed at 6/15/2025 0500  Gross per 24 hour   Intake 506 ml   Output --   Net 506 ml     I/O last 3 completed shifts:  In: 926 [P.O.:926]  Out: -     Safety Concerns:     At Risk for Falls    Impairments/Disabilities:      Contractures - right side Hx CVA    Nutrition Therapy:  Current Nutrition Therapy:   - Oral Diet:  General    Routes of Feeding: Oral  Liquids: No Restrictions  Daily Fluid Restriction: no  Last Modified Barium Swallow with Video (Video Swallowing Test): not done    Treatments at the Time of Hospital Discharge:   Respiratory Treatments:   Oxygen Therapy:  is not on home oxygen therapy.  Ventilator:    - No ventilator support    Rehab Therapies:   Weight Bearing Status/Restrictions: No weight bearing restrictions  Other Medical Equipment (for information only, NOT a DME order):  wheelchair and walker  Other Treatments:     Patient's personal belongings (please select all that are sent with patient):       RN SIGNATURE: .................    CASE MANAGEMENT/SOCIAL WORK SECTION    Inpatient Status Date: ***    Readmission Risk Assessment Score:  [facility] RISK OF UNPLANNED READMISSION 2.0             0 Total Score        Discharging to Facility/ Agency   Name:   Address:  Phone:  Fax:    Dialysis Facility (if applicable)   Name:  Address:  Dialysis Schedule:  Phone:  Fax:    / signature: {Esignature:[id number]}    PHYSICIAN SECTION    Prognosis: Good    Condition at Discharge: Stable    Rehab Potential (if transferring to Rehab): Good    Recommended Labs or Other Treatments After Discharge: PT/OT, medication compliance, CBC

## 2025-06-15 NOTE — PLAN OF CARE
Problem: Chronic Conditions and Co-morbidities  Goal: Patient's chronic conditions and co-morbidity symptoms are monitored and maintained or improved  6/15/2025 0831 by Syeda Dewey RN  Outcome: Progressing  6/14/2025 2306 by Radha Etienne RN  Outcome: Progressing     Problem: Discharge Planning  Goal: Discharge to home or other facility with appropriate resources  6/15/2025 0831 by Syeda Dewey RN  Outcome: Progressing  6/14/2025 2306 by Radha Etienne RN  Outcome: Progressing     Problem: Pain  Goal: Verbalizes/displays adequate comfort level or baseline comfort level  6/15/2025 0831 by Syeda Dewey RN  Outcome: Progressing  Flowsheets  Taken 6/15/2025 0500 by Radha Etienne RN  Verbalizes/displays adequate comfort level or baseline comfort level: Assess pain using appropriate pain scale  Taken 6/15/2025 0215 by Radha Etienne RN  Verbalizes/displays adequate comfort level or baseline comfort level: Assess pain using appropriate pain scale  6/14/2025 2306 by Radha Etienne RN  Outcome: Progressing  Flowsheets (Taken 6/14/2025 2115)  Verbalizes/displays adequate comfort level or baseline comfort level: Assess pain using appropriate pain scale     Problem: Skin/Tissue Integrity  Goal: Skin integrity remains intact  Description: 1.  Monitor for areas of redness and/or skin breakdown2.  Assess vascular access sites hourly3.  Every 4-6 hours minimum:  Change oxygen saturation probe site4.  Every 4-6 hours:  If on nasal continuous positive airway pressure, respiratory therapy assess nares and determine need for appliance change or resting period  6/15/2025 0831 by Syeda Dewey RN  Outcome: Progressing  6/14/2025 2306 by Radha Etienne RN  Outcome: Progressing  Flowsheets (Taken 6/14/2025 2000)  Skin Integrity Remains Intact: Assess vascular access sites hourly     Problem: Safety - Adult  Goal: Free from fall injury  Outcome: Progressing  Flowsheets (Taken 6/14/2025 2000 by Radha Etienne RN)  Free

## 2025-06-16 LAB
ALBUMIN SERPL-MCNC: 3.7 G/DL (ref 3.4–5)
ANION GAP SERPL CALCULATED.3IONS-SCNC: 13 MMOL/L (ref 3–16)
BASOPHILS # BLD: 0 K/UL (ref 0–0.2)
BASOPHILS NFR BLD: 0.5 %
BUN SERPL-MCNC: 32 MG/DL (ref 7–20)
CALCIUM SERPL-MCNC: 9.2 MG/DL (ref 8.3–10.6)
CHLORIDE SERPL-SCNC: 99 MMOL/L (ref 99–110)
CO2 SERPL-SCNC: 25 MMOL/L (ref 21–32)
CREAT SERPL-MCNC: 4.8 MG/DL (ref 0.6–1.1)
DEPRECATED RDW RBC AUTO: 17.2 % (ref 12.4–15.4)
EOSINOPHIL # BLD: 0.1 K/UL (ref 0–0.6)
EOSINOPHIL NFR BLD: 2.3 %
GFR SERPLBLD CREATININE-BSD FMLA CKD-EPI: 10 ML/MIN/{1.73_M2}
GLUCOSE SERPL-MCNC: 142 MG/DL (ref 70–99)
HCT VFR BLD AUTO: 33.9 % (ref 36–48)
HGB BLD-MCNC: 11.1 G/DL (ref 12–16)
LYMPHOCYTES # BLD: 0.7 K/UL (ref 1–5.1)
LYMPHOCYTES NFR BLD: 13.5 %
MAGNESIUM SERPL-MCNC: 1.99 MG/DL (ref 1.8–2.4)
MCH RBC QN AUTO: 30 PG (ref 26–34)
MCHC RBC AUTO-ENTMCNC: 32.8 G/DL (ref 31–36)
MCV RBC AUTO: 91.3 FL (ref 80–100)
MONOCYTES # BLD: 0.5 K/UL (ref 0–1.3)
MONOCYTES NFR BLD: 9.9 %
NEUTROPHILS # BLD: 3.6 K/UL (ref 1.7–7.7)
NEUTROPHILS NFR BLD: 73.8 %
PHOSPHATE SERPL-MCNC: 6.1 MG/DL (ref 2.5–4.9)
PLATELET # BLD AUTO: 131 K/UL (ref 135–450)
PMV BLD AUTO: 8.4 FL (ref 5–10.5)
POTASSIUM SERPL-SCNC: 4.7 MMOL/L (ref 3.5–5.1)
RBC # BLD AUTO: 3.71 M/UL (ref 4–5.2)
SODIUM SERPL-SCNC: 137 MMOL/L (ref 136–145)
WBC # BLD AUTO: 4.9 K/UL (ref 4–11)

## 2025-06-16 PROCEDURE — 90935 HEMODIALYSIS ONE EVALUATION: CPT

## 2025-06-16 PROCEDURE — 94760 N-INVAS EAR/PLS OXIMETRY 1: CPT

## 2025-06-16 PROCEDURE — 80069 RENAL FUNCTION PANEL: CPT

## 2025-06-16 PROCEDURE — 6360000002 HC RX W HCPCS: Performed by: PHYSICIAN ASSISTANT

## 2025-06-16 PROCEDURE — 83735 ASSAY OF MAGNESIUM: CPT

## 2025-06-16 PROCEDURE — 1200000000 HC SEMI PRIVATE

## 2025-06-16 PROCEDURE — 6370000000 HC RX 637 (ALT 250 FOR IP): Performed by: PHYSICIAN ASSISTANT

## 2025-06-16 PROCEDURE — APPSS30 APP SPLIT SHARED TIME 16-30 MINUTES: Performed by: NURSE PRACTITIONER

## 2025-06-16 PROCEDURE — 2700000000 HC OXYGEN THERAPY PER DAY

## 2025-06-16 PROCEDURE — 36556 INSERT NON-TUNNEL CV CATH: CPT

## 2025-06-16 PROCEDURE — APPNB30 APP NON BILLABLE TIME 0-30 MINS: Performed by: NURSE PRACTITIONER

## 2025-06-16 PROCEDURE — 85025 COMPLETE CBC W/AUTO DIFF WBC: CPT

## 2025-06-16 PROCEDURE — 2500000003 HC RX 250 WO HCPCS: Performed by: PHYSICIAN ASSISTANT

## 2025-06-16 PROCEDURE — 36415 COLL VENOUS BLD VENIPUNCTURE: CPT

## 2025-06-16 RX ADMIN — BUSPIRONE HYDROCHLORIDE 10 MG: 5 TABLET ORAL at 20:52

## 2025-06-16 RX ADMIN — CLONIDINE HYDROCHLORIDE 0.2 MG: 0.1 TABLET ORAL at 20:52

## 2025-06-16 RX ADMIN — HEPARIN SODIUM 5000 UNITS: 5000 INJECTION INTRAVENOUS; SUBCUTANEOUS at 22:13

## 2025-06-16 RX ADMIN — BUSPIRONE HYDROCHLORIDE 10 MG: 5 TABLET ORAL at 11:11

## 2025-06-16 RX ADMIN — ALPRAZOLAM 0.5 MG: 0.5 TABLET ORAL at 11:10

## 2025-06-16 RX ADMIN — Medication 10 ML: at 22:22

## 2025-06-16 RX ADMIN — PREGABALIN 75 MG: 75 CAPSULE ORAL at 16:52

## 2025-06-16 RX ADMIN — CLONIDINE HYDROCHLORIDE 0.2 MG: 0.1 TABLET ORAL at 16:52

## 2025-06-16 RX ADMIN — ASPIRIN 81 MG: 81 TABLET, DELAYED RELEASE ORAL at 11:10

## 2025-06-16 RX ADMIN — ALPRAZOLAM 0.5 MG: 0.5 TABLET ORAL at 01:51

## 2025-06-16 RX ADMIN — CLONIDINE HYDROCHLORIDE 0.2 MG: 0.1 TABLET ORAL at 11:10

## 2025-06-16 RX ADMIN — PANTOPRAZOLE SODIUM 40 MG: 40 TABLET, DELAYED RELEASE ORAL at 16:52

## 2025-06-16 RX ADMIN — AMLODIPINE BESYLATE 5 MG: 5 TABLET ORAL at 11:10

## 2025-06-16 RX ADMIN — TORSEMIDE 40 MG: 20 TABLET ORAL at 11:10

## 2025-06-16 RX ADMIN — Medication 10 ML: at 11:11

## 2025-06-16 RX ADMIN — HEPARIN SODIUM 5000 UNITS: 5000 INJECTION INTRAVENOUS; SUBCUTANEOUS at 16:52

## 2025-06-16 RX ADMIN — ALPRAZOLAM 0.5 MG: 0.5 TABLET ORAL at 16:51

## 2025-06-16 RX ADMIN — VENLAFAXINE HYDROCHLORIDE 37.5 MG: 37.5 CAPSULE, EXTENDED RELEASE ORAL at 16:57

## 2025-06-16 RX ADMIN — PANTOPRAZOLE SODIUM 40 MG: 40 TABLET, DELAYED RELEASE ORAL at 06:55

## 2025-06-16 ASSESSMENT — PAIN SCALES - GENERAL
PAINLEVEL_OUTOF10: 0

## 2025-06-16 NOTE — CARE COORDINATION
06/16/25 1025   IMM Letter   IMM Letter given to Patient/Family/Significant other/Guardian/POA/by: Spoke to patient's  as requested by patient.   IMM Letter date given: 06/16/25   IMM Letter time given: 0945

## 2025-06-16 NOTE — PLAN OF CARE
Problem: Chronic Conditions and Co-morbidities  Goal: Patient's chronic conditions and co-morbidity symptoms are monitored and maintained or improved  6/15/2025 2225 by Radha Etinene RN  Outcome: Progressing    Problem: Discharge Planning  Goal: Discharge to home or other facility with appropriate resources  6/15/2025 2225 by Radha Etienne RN  Outcome: Progressing  Problem: Pain  Goal: Verbalizes/displays adequate comfort level or baseline comfort level  6/15/2025 2225 by Radha Etienne RN  Outcome: Progressing  Flowsheets (Taken 6/15/2025 2030)  Verbalizes/displays adequate comfort level or baseline comfort level: Assess pain using appropriate pain scale  Verbalizes/displays adequate comfort level or baseline comfort level: Assess pain using appropriate pain scale  Taken 6/15/2025 0215 by Radha Etienne RN  Verbalizes/displays adequate comfort level or baseline comfort level: Assess pain using appropriate pain scale     Problem: Skin/Tissue Integrity  Goal: Skin integrity remains intact  Description: 1.  Monitor for areas of redness and/or skin breakdown2.  Assess vascular access sites hourly3.  Every 4-6 hours minimum:  Change oxygen saturation probe site4.  Every 4-6 hours:  If on nasal continuous positive airway pressure, respiratory therapy assess nares and determine need for appliance change or resting period  6/15/2025 2225 by Radha Etienne RN  Outcome: Progressing     Problem: Safety - Adult  Goal: Free from fall injury  Outcome: Progressing  Flowsheets (Taken 6/14/2025 2000 by Radha Etienne, RN)  Free From Fall Injury: Instruct family/caregiver on patient safety

## 2025-06-16 NOTE — CARE COORDINATION
Discharge Planning:     (RIKI) went to Patient's room, to speak with her and complete DCPA, however she was off the floor at dialysis.     It was reported in IDR's this morning that Patient lives at home with her  who is her caregiver and that she will not go to a SNF. It was reported that Patient will need transport home after her HD.    CM was then notified that Patient had a Home 02 ordered. Patient will not be able to be discharged until Home 02 eval is completed. If the Patient needs oxygen at D/C then she won't be able to D/C until oxygen is set up and transport is scheduled.     RIKI spoke with Dr. Barriga about the above as well.     CM team to follow.     Electronically signed by LEYDI Nuno on 6/16/2025 at 1:42 PM

## 2025-06-16 NOTE — DISCHARGE SUMMARY
V2.0  Discharge Summary    Name:  Kristine Ramirez /Age/Sex: 1975 (49 y.o. female)   Admit Date: 2025  Discharging Provider: Jeffy Barriga MD   MRN & CSN:  [id number] & [id number] Attending:  Jeffy Barriga MD       Brief HPI: Kristine Ramirez is a 49 y.o. female with PMHx of CVA with residual right sided deficits, hypertension, ESRD on HD, tobacco abuse who was admitted with hypervolemia.  Patient was seen by nephrology and underwent multiple sessions of hemodialysis.  Patient was also seen by vascular surgery for her AV fistula which remains nonfunctional.  Patient will follow-up with vascular surgery in the outpatient setting for new AV access creation. Patient was initially discharged but was unable to leave on day of discharge as she was not feeling well.  Patient was discharged the following day.  Patient will follow-up with vascular surgery in the outpatient setting for new AV access creation.    Brief Problem Focused Hospital Course:     Hypervolemia  Patient presented with, \"hypoxia upon arrival with SpO2 87% on RA.  Patient was placed on 2L O2 with good response.  Patient not on O2 at baseline.  Patient reports some shortness of breath.\" CXR obtained in ED notable for worsening pulmonary edema and moderate right sided pleural effusion consistent with fluid overload. NT proBNP > 70,000.   - Nephrology consulted     CVA   Patient has residual right sided weakness and frequent falls  At home, patient is on aspirin     HTN  At home, patient is on clonidine and Norvasc     Tobacco Abuse  Patient endorses tobacco use.  I spent at least 4 minutes discussing the importance of tobacco cessation in light of all the complications related to tobacco abuse.  Patient voiced understanding and will work on cutting back and/or quitting completely.     The patient expressed appropriate understanding of, and agreement with the discharge recommendations, medications, and plan.     Consults this

## 2025-06-17 ENCOUNTER — APPOINTMENT (OUTPATIENT)
Dept: GENERAL RADIOLOGY | Age: 50
DRG: 314 | End: 2025-06-17
Payer: COMMERCIAL

## 2025-06-17 VITALS
BODY MASS INDEX: 26.54 KG/M2 | RESPIRATION RATE: 16 BRPM | WEIGHT: 169.09 LBS | HEIGHT: 67 IN | DIASTOLIC BLOOD PRESSURE: 78 MMHG | OXYGEN SATURATION: 97 % | SYSTOLIC BLOOD PRESSURE: 163 MMHG | HEART RATE: 101 BPM | TEMPERATURE: 98.1 F

## 2025-06-17 LAB
ALBUMIN SERPL-MCNC: 3.5 G/DL (ref 3.4–5)
ANION GAP SERPL CALCULATED.3IONS-SCNC: 13 MMOL/L (ref 3–16)
BASOPHILS # BLD: 0 K/UL (ref 0–0.2)
BASOPHILS NFR BLD: 0.7 %
BUN SERPL-MCNC: 22 MG/DL (ref 7–20)
CALCIUM SERPL-MCNC: 8.9 MG/DL (ref 8.3–10.6)
CHLORIDE SERPL-SCNC: 102 MMOL/L (ref 99–110)
CO2 SERPL-SCNC: 24 MMOL/L (ref 21–32)
CREAT SERPL-MCNC: 3.8 MG/DL (ref 0.6–1.1)
DEPRECATED RDW RBC AUTO: 17.2 % (ref 12.4–15.4)
EOSINOPHIL # BLD: 0.2 K/UL (ref 0–0.6)
EOSINOPHIL NFR BLD: 4 %
GFR SERPLBLD CREATININE-BSD FMLA CKD-EPI: 14 ML/MIN/{1.73_M2}
GLUCOSE SERPL-MCNC: 129 MG/DL (ref 70–99)
HCT VFR BLD AUTO: 32.7 % (ref 36–48)
HGB BLD-MCNC: 10.7 G/DL (ref 12–16)
LYMPHOCYTES # BLD: 0.7 K/UL (ref 1–5.1)
LYMPHOCYTES NFR BLD: 18.5 %
MAGNESIUM SERPL-MCNC: 2 MG/DL (ref 1.8–2.4)
MCH RBC QN AUTO: 29.9 PG (ref 26–34)
MCHC RBC AUTO-ENTMCNC: 32.7 G/DL (ref 31–36)
MCV RBC AUTO: 91.3 FL (ref 80–100)
MONOCYTES # BLD: 0.4 K/UL (ref 0–1.3)
MONOCYTES NFR BLD: 10.5 %
NEUTROPHILS # BLD: 2.6 K/UL (ref 1.7–7.7)
NEUTROPHILS NFR BLD: 66.3 %
PHOSPHATE SERPL-MCNC: 4.8 MG/DL (ref 2.5–4.9)
PLATELET # BLD AUTO: 125 K/UL (ref 135–450)
PMV BLD AUTO: 8.3 FL (ref 5–10.5)
POTASSIUM SERPL-SCNC: 4.6 MMOL/L (ref 3.5–5.1)
RBC # BLD AUTO: 3.57 M/UL (ref 4–5.2)
SODIUM SERPL-SCNC: 139 MMOL/L (ref 136–145)
WBC # BLD AUTO: 3.8 K/UL (ref 4–11)

## 2025-06-17 PROCEDURE — 85025 COMPLETE CBC W/AUTO DIFF WBC: CPT

## 2025-06-17 PROCEDURE — 6370000000 HC RX 637 (ALT 250 FOR IP): Performed by: PHYSICIAN ASSISTANT

## 2025-06-17 PROCEDURE — 97535 SELF CARE MNGMENT TRAINING: CPT

## 2025-06-17 PROCEDURE — 94680 O2 UPTK RST&XERS DIR SIMPLE: CPT

## 2025-06-17 PROCEDURE — 83735 ASSAY OF MAGNESIUM: CPT

## 2025-06-17 PROCEDURE — 71045 X-RAY EXAM CHEST 1 VIEW: CPT

## 2025-06-17 PROCEDURE — 97530 THERAPEUTIC ACTIVITIES: CPT

## 2025-06-17 PROCEDURE — 80069 RENAL FUNCTION PANEL: CPT

## 2025-06-17 RX ORDER — AMLODIPINE BESYLATE 5 MG/1
10 TABLET ORAL DAILY
Status: DISCONTINUED | OUTPATIENT
Start: 2025-06-18 | End: 2025-06-17 | Stop reason: HOSPADM

## 2025-06-17 RX ADMIN — ALPRAZOLAM 0.5 MG: 0.5 TABLET ORAL at 15:48

## 2025-06-17 RX ADMIN — CLONIDINE HYDROCHLORIDE 0.2 MG: 0.1 TABLET ORAL at 15:48

## 2025-06-17 RX ADMIN — VENLAFAXINE HYDROCHLORIDE 37.5 MG: 37.5 CAPSULE, EXTENDED RELEASE ORAL at 09:20

## 2025-06-17 RX ADMIN — PANTOPRAZOLE SODIUM 40 MG: 40 TABLET, DELAYED RELEASE ORAL at 15:48

## 2025-06-17 RX ADMIN — PANTOPRAZOLE SODIUM 40 MG: 40 TABLET, DELAYED RELEASE ORAL at 05:21

## 2025-06-17 RX ADMIN — BUSPIRONE HYDROCHLORIDE 10 MG: 5 TABLET ORAL at 09:20

## 2025-06-17 RX ADMIN — ASPIRIN 81 MG: 81 TABLET, DELAYED RELEASE ORAL at 09:20

## 2025-06-17 RX ADMIN — ALPRAZOLAM 0.5 MG: 0.5 TABLET ORAL at 00:20

## 2025-06-17 RX ADMIN — PREGABALIN 75 MG: 75 CAPSULE ORAL at 09:20

## 2025-06-17 RX ADMIN — TORSEMIDE 40 MG: 20 TABLET ORAL at 09:20

## 2025-06-17 RX ADMIN — AMLODIPINE BESYLATE 5 MG: 5 TABLET ORAL at 09:20

## 2025-06-17 RX ADMIN — ALPRAZOLAM 0.5 MG: 0.5 TABLET ORAL at 05:19

## 2025-06-17 RX ADMIN — ALPRAZOLAM 0.5 MG: 0.5 TABLET ORAL at 11:58

## 2025-06-17 RX ADMIN — CLONIDINE HYDROCHLORIDE 0.2 MG: 0.1 TABLET ORAL at 09:20

## 2025-06-17 NOTE — PLAN OF CARE
Problem: Chronic Conditions and Co-morbidities  Goal: Patient's chronic conditions and co-morbidity symptoms are monitored and maintained or improved  Outcome: Progressing     Problem: Discharge Planning  Goal: Discharge to home or other facility with appropriate resources  Outcome: Progressing     Problem: Pain  Goal: Verbalizes/displays adequate comfort level or baseline comfort level  Outcome: Progressing     Problem: Skin/Tissue Integrity  Goal: Skin integrity remains intact  Description: 1.  Monitor for areas of redness and/or skin breakdown2.  Assess vascular access sites hourly3.  Every 4-6 hours minimum:  Change oxygen saturation probe site4.  Every 4-6 hours:  If on nasal continuous positive airway pressure, respiratory therapy assess nares and determine need for appliance change or resting period  Outcome: Progressing     Problem: Safety - Adult  Goal: Free from fall injury  Outcome: Progressing     Problem: Respiratory - Adult  Goal: Achieves optimal ventilation and oxygenation  Outcome: Progressing     Problem: Skin/Tissue Integrity - Adult  Goal: Skin integrity remains intact  Description: 1.  Monitor for areas of redness and/or skin breakdown2.  Assess vascular access sites hourly3.  Every 4-6 hours minimum:  Change oxygen saturation probe site4.  Every 4-6 hours:  If on nasal continuous positive airway pressure, respiratory therapy assess nares and determine need for appliance change or resting period  Outcome: Progressing     Problem: Musculoskeletal - Adult  Goal: Return mobility to safest level of function  Outcome: Progressing     Problem: Metabolic/Fluid and Electrolytes - Adult  Goal: Electrolytes maintained within normal limits  Outcome: Progressing  Goal: Hemodynamic stability and optimal renal function maintained  Outcome: Progressing  Goal: Glucose maintained within prescribed range  Outcome: Progressing     Problem: Hematologic - Adult  Goal: Maintains hematologic stability  Outcome:

## 2025-06-17 NOTE — PLAN OF CARE
Problem: Chronic Conditions and Co-morbidities  Goal: Patient's chronic conditions and co-morbidity symptoms are monitored and maintained or improved  6/16/2025 2312 by Kian Cruz RN  Outcome: Progressing     Problem: Discharge Planning  Goal: Discharge to home or other facility with appropriate resources  6/16/2025 2312 by Kian Cruz RN  Outcome: Progressing     Problem: Pain  Goal: Verbalizes/displays adequate comfort level or baseline comfort level  6/16/2025 2312 by Kian Cruz RN  Outcome: Progressing     Problem: Skin/Tissue Integrity  Goal: Skin integrity remains intact  Description: 1.  Monitor for areas of redness and/or skin breakdown2.  Assess vascular access sites hourly3.  Every 4-6 hours minimum:  Change oxygen saturation probe site4.  Every 4-6 hours:  If on nasal continuous positive airway pressure, respiratory therapy assess nares and determine need for appliance change or resting period  6/16/2025 2312 by Kian Cruz RN  Outcome: Progressing     Problem: Safety - Adult  Goal: Free from fall injury  6/16/2025 2312 by Kian Cruz RN  Outcome: Progressing     Problem: Respiratory - Adult  Goal: Achieves optimal ventilation and oxygenation  6/16/2025 2312 by Kian Cruz RN  Outcome: Progressing     Problem: Skin/Tissue Integrity - Adult  Goal: Skin integrity remains intact  Description: 1.  Monitor for areas of redness and/or skin breakdown2.  Assess vascular access sites hourly3.  Every 4-6 hours minimum:  Change oxygen saturation probe site4.  Every 4-6 hours:  If on nasal continuous positive airway pressure, respiratory therapy assess nares and determine need for appliance change or resting period  6/16/2025 2312 by Kian Cruz RN  Outcome: Progressing     Problem: Musculoskeletal - Adult  Goal: Return mobility to safest level of function  6/16/2025 2312 by Kian Cruz RN  Outcome: Progressing     Problem: Metabolic/Fluid and Electrolytes - Adult  Goal: Electrolytes  normal

## 2025-06-17 NOTE — CARE COORDINATION
Case Management Assessment  Initial Evaluation    Date/Time of Evaluation: 6/17/2025 1:15 PM  Assessment Completed by: Flori Geiger RN    If patient is discharged prior to next notation, then this note serves as note for discharge by case management.    Patient Name: Kristine Ramirez                   YOB: 1975  Diagnosis: Hypervolemia [E87.70]  History of stroke [Z86.73]  Frequent falls [R29.6]  Hypervolemia, unspecified hypervolemia type [E87.70]                   Date / Time: 6/12/2025  7:18 PM    Patient Admission Status: Inpatient   Readmission Risk (Low < 19, Mod (19-27), High > 27): Readmission Risk Score: 25.8    Current PCP: Damon Mireles MD  PCP verified by CM? Yes    Chart Reviewed: Yes      History Provided by: Significant Other  Patient Orientation: Alert and Oriented, Person, Place, Situation (with moments of forgetfulness)    Patient Cognition: Alert    Hospitalization in the last 30 days (Readmission):  No    If yes, Readmission Assessment in  Navigator will be completed.    Advance Directives:      Code Status: Full Code   Patient's Primary Decision Maker is: Named in Scanned ACP Document    Primary Decision Maker: Kannan Ramirez - Spouse - [phone]    Discharge Planning:    Patient lives with: Spouse/Significant Other Type of Home: Apartment  Primary Care Giver: Spouse  Patient Support Systems include: Spouse/Significant Other   Current Financial resources: Medicare  Current community resources: None  Current services prior to admission: Other (Comment) (Dialysis)            Current DME:              Type of Home Care services:  None    ADLS  Prior functional level: Assistance with the following:, Bathing, Dressing, Toileting, Mobility, Shopping, Housework, Cooking (spouse helps)  Current functional level: Assistance with the following:, Bathing, Dressing, Toileting, Mobility, Cooking, Housework, Shopping (spouse helps)    PT AM-PAC: 12 /24  OT AM-PAC: 15

## 2025-06-17 NOTE — PROGRESS NOTES
Admit Date: 6/12/2025  Diet: ADULT DIET; Clear Liquid    CC: Hypervolemia    Interval history:   Overnight, there were no acute events. Patient's vitals remained stable    Patient was seen this morning. I discussed the plan of the day with her in detail. All questions were addressed and answered to patient's satisfaction.  Patient endorses some shortness of breath.  Patient denies fevers, chills, nausea, vomiting, chest pain, diarrhea, constipation, dysuria, urinary frequency or urgency.     Plan:     -HD today per nephro  -Hemodialysis access duplex per Vascular Sx    Assessment:   Kristine Ramirez is a 49 y.o. female with PMH of CVA with residual right sided deficits, hypertension, ESRD on HD, tobacco abuse who was admitted with hypervolemia    Hypervolemia  Patient presented with, \"hypoxia upon arrival with SpO2 87% on RA.  Patient was placed on 2L O2 with good response.  Patient not on O2 at baseline.  Patient reports some shortness of breath.\" CXR obtained in ED notable for worsening pulmonary edema and moderate right sided pleural effusion consistent with fluid overload. NT proBNP > 70,000.   - Nephrology consulted     CVA   Patient has residual right sided weakness and frequent falls  At home, patient is on aspirin     HTN  At home, patient is on clonidine and Norvasc    Tobacco Abuse  Patient endorses tobacco use.  I spent at least 4 minutes discussing the importance of tobacco cessation in light of all the complications related to tobacco abuse.  Patient voiced understanding and will work on cutting back and/or quitting completely.     Code Status: Full Code   FEN: ADULT DIET; Clear Liquid   DVT PPX: []Lovenox, [x]Heparin, []Coumadin, []Eliquis, []Xarelto, []SCD  DISPO Pending: HD sessions    Jeffy Barriga MD  6/14/2025,  12:44 PM    This note was likely completed using voice recognition technology and may contain unintended errors.     Objective:   Vitals:   T-max:  Patient Vitals for the past 8 
      Admit Date: 6/12/2025  Diet: ADULT DIET; Regular  Diet NPO    CC: Hypervolemia    Interval history:   Overnight, there were no acute events. Patient's vitals remained stable    Patient was seen this morning. I discussed the plan of the day with her in detail. All questions were addressed and answered to patient's satisfaction.  Patient denies fevers, chills, nausea, vomiting, chest pain, diarrhea, constipation, dysuria, urinary frequency or urgency.     Plan:     -Pt is discharged, pending placement    Assessment:   Kristine Ramirez is a 49 y.o. female with PMH of CVA with residual right sided deficits, hypertension, ESRD on HD, tobacco abuse who was admitted with hypervolemia    Hypervolemia  Patient presented with, \"hypoxia upon arrival with SpO2 87% on RA.  Patient was placed on 2L O2 with good response.  Patient not on O2 at baseline.  Patient reports some shortness of breath.\" CXR obtained in ED notable for worsening pulmonary edema and moderate right sided pleural effusion consistent with fluid overload. NT proBNP > 70,000.   - Nephrology consulted     CVA   Patient has residual right sided weakness and frequent falls  At home, patient is on aspirin     HTN  At home, patient is on clonidine and Norvasc    Tobacco Abuse  Patient endorses tobacco use.  I spent at least 4 minutes discussing the importance of tobacco cessation in light of all the complications related to tobacco abuse.  Patient voiced understanding and will work on cutting back and/or quitting completely.     Code Status: Full Code   FEN: ADULT DIET; Regular  Diet NPO   DVT PPX: []Lovenox, [x]Heparin, []Coumadin, []Eliquis, []Xarelto, []SCD  DISPO Pending: Discharge    Jeffy Barriga MD  6/15/2025,  12:16 PM    This note was likely completed using voice recognition technology and may contain unintended errors.     Objective:   Vitals:   T-max:  Patient Vitals for the past 8 hrs:   BP Temp Temp src Pulse Resp SpO2 Weight   06/15/25 1149 
   06/13/25 1200   RT Protocol   History Pulmonary Disease 1   Respiratory pattern 0   Breath sounds 2   Cough 0   Bronchodilator Assessment Score 3       
   06/17/25 1159   Resting (Room Air)   SpO2 92   HR 99   During Walk (Room Air)   SpO2 94      Walk/Assistance Device Walker   Rate of Dyspnea 0   After Walk   SpO2 91      O2 Flow Rate (l/min) 0 l/min   Rate of Dyspnea 0   Does the Patient Qualify for Home O2 No   Does the Patient Need Portable Oxygen Tanks No       
  [facility] - Inpatient Rehabilitation Department   Phone: [phone]    Occupational Therapy    [] Initial Evaluation            [x] Daily Treatment Note         [] Discharge Summary      Patient: Kristine Ramirez   : 1975   MRN: [id number]   Date of Service:  2025    Admitting Diagnosis:  Hypervolemia  Current Admission Summary:     Fall        Pt came in from home via [facility] ems, pt reports two mechanical falls out of bed today, pt reports hitting head, pt denies blood thinner use, pt a dialysis pt and missed appointment yesterday            HISTORY OF PRESENT ILLNESS   (Location/Symptom, Timing/Onset, Context/Setting, Quality, Duration, Modifying Factors, Severity)  Note limiting factors.   Kristine Ramirez is a 49 y.o. female who presents to the emergency department for 2 mechanical falls out of bed today.  Patient reports that she slid out of bed both times that she tried to get up and walk.  She hit her head both times.  She states that she remembers the event, did not pass out.  She denies any chest pain or trouble breathing.  She believes last dialysis was on Monday.  She is not anticoagulated.  She lives at home.  She was also seen in the emergency room yesterday for a fall, had difficulty getting up on her own and  needed to call lift assist.        Chart reviewed: Patient has a history of CVA with residual right sided weakness, CHF, COPD, diabetes.  Is not anticoagulated.  Past Medical History:  has a past medical history of Acute respiratory failure due to COVID-19 ([facility]), Arterial ischemic stroke, ICA, left, acute ([facility]), Blind in both eyes, Cerebral artery occlusion with cerebral infarction ([facility]), CHF (congestive heart failure) ([facility]), Chronic kidney disease, COPD (chronic obstructive pulmonary disease) ([facility]), Depression, Diabetes mellitus out of control, Diabetes mellitus, type II ([facility]), Diabetic neuropathy associated with type 2 diabetes mellitus 
  [facility] - Inpatient Rehabilitation Department   Phone: [phone]    Occupational Therapy    [x] Initial Evaluation            [] Daily Treatment Note         [] Discharge Summary      Patient: Kristine Ramirez   : 1975   MRN: [id number]   Date of Service:  2025    Admitting Diagnosis:  Hypervolemia  Current Admission Summary:     Fall        Pt came in from home via [facility] ems, pt reports two mechanical falls out of bed today, pt reports hitting head, pt denies blood thinner use, pt a dialysis pt and missed appointment yesterday            HISTORY OF PRESENT ILLNESS   (Location/Symptom, Timing/Onset, Context/Setting, Quality, Duration, Modifying Factors, Severity)  Note limiting factors.   Kristine Ramirez is a 49 y.o. female who presents to the emergency department for 2 mechanical falls out of bed today.  Patient reports that she slid out of bed both times that she tried to get up and walk.  She hit her head both times.  She states that she remembers the event, did not pass out.  She denies any chest pain or trouble breathing.  She believes last dialysis was on Monday.  She is not anticoagulated.  She lives at home.  She was also seen in the emergency room yesterday for a fall, had difficulty getting up on her own and  needed to call lift assist.        Chart reviewed: Patient has a history of CVA with residual right sided weakness, CHF, COPD, diabetes.  Is not anticoagulated.  Past Medical History:  has a past medical history of Acute respiratory failure due to COVID-19 ([facility]), Arterial ischemic stroke, ICA, left, acute ([facility]), Blind in both eyes, Cerebral artery occlusion with cerebral infarction ([facility]), CHF (congestive heart failure) ([facility]), Chronic kidney disease, COPD (chronic obstructive pulmonary disease) ([facility]), Depression, Diabetes mellitus out of control, Diabetes mellitus, type II ([facility]), Diabetic neuropathy associated with type 2 diabetes mellitus 
  [facility]    Hospitalist Progress Note:      Name:  Kristine Ramirez /Age/Sex: 1975  (50 y.o. female)   MRN & CSN:  [id number] & [id number] Encounter Date: 2025    Location:  [id number] PCP: Damon Mireles MD     Attending:Graham Raza MD  Admission Date: 2025      Hospital Day: 6    Chief Complain/Reason for Admission:  Chief Complaint   Patient presents with    Fall     Pt came in from home via [facility] ems, pt reports two mechanical falls out of bed today, pt reports hitting head, pt denies blood thinner use, pt a dialysis pt and missed appointment yesterday     Assessment:  Kristine Ramirez is a 49 y.o. female with PMH of CVA with residual right sided deficits, hypertension, ESRD on HD, tobacco abuse who was admitted with hypervolemia..  Volume overload  Hypoxia, POA due to #1  Known right central vein occlusion  Right brachiocephalic AVF (2022)  ESRD on HD - MWFDM  HTN  HLD  H/o CVA with residual right sided weakness    Plan:   Medically stable for discharge to home and follow-up with nephrology team as an outpatient.  Off oxygen, ambulatory pulse oximetry without any hypoxia and maintaining oxygen on room air.  Chest x-ray from today noted.  Continue aggressive fluid removal and volume management by nephrology team as an outpatient.  Next dialysis is tomorrow.  Current meds reviewed, adjusted.   See orders  Diet ADULT DIET; Regular   DVT Prophylaxis [] Lovenox, [x]  Heparin, [] SCDs, [] Ambulation,  [] Eliquis, [] Xarelto  [] Coumadin   Code Status Full Code   Disposition Expected Disposition:  with [facility]  Estimated Date of Discharge:  later today  Reason for continued admission: Dc home today     Subjective:  Pt. seen and examined at bedside.    Overall symptoms are resolved  States feeling much better overall today.  Denies any CP/Cough/SOB/Abd pain/ N/V/Diarrhea.   No HA/Dizziness or any tingling/numbness.     Objective:  Vital 
  Vascular Surgery Progress Note        Chief Complaint: Consult follow up - malfunction RUORAL AVF    SUBJECTIVE:  states she is tired and wants to eat regular food not just liquid    OBJECTIVE    Physical  CURRENT VITALS:  BP (!) 156/84   Pulse (!) 109   Temp 97.1 °F (36.2 °C) (Oral)   Resp 19   Ht 1.702 m (5' 7\")   Wt 71.5 kg (157 lb 9.6 oz)   LMP 09/01/2022   SpO2 94%   BMI 24.68 kg/m²   24 HR INTAKE/OUTPUT:    Intake/Output Summary (Last 24 hours) at 6/14/2025 0744  Last data filed at 6/14/2025 0015  Gross per 24 hour   Intake 540 ml   Output --   Net 540 ml     Left subclavian tunneled dialysis catheter placed yesterday  Right arm with ace wrap intact. Hand warm. 2+ ulnar pulse, 1+ radial  Right upper arm AVF with + bruit/thrill.       Data  CBC:   Recent Labs     06/12/25 2014 06/13/25 0424 06/14/25  0606   WBC 7.2 5.4 5.3   HGB 11.4* 11.3* 10.7*   HCT 34.8* 34.6* 32.9*   MCV 92.0 90.7 91.5    158 139     BMP:   Recent Labs     06/12/25 2014 06/13/25 0424 06/14/25  0606   * 133* 134*   K 5.1 4.9 4.8   CL 88* 88* 93*   CO2 24 24 23   PHOS  --  9.2* 7.7*   GLUCOSE 143* 132* 123*   BUN 65* 68* 42*   CREATININE 7.1* 7.6* 5.7*   CALCIUM 8.5 8.2* 8.6   MG 1.90  --  2.00     Accucheck Glucoses:   Recent Labs     06/13/25  1645   POCGLU 139*       Current Inpatient Medications  Current Facility-Administered Medications: ALPRAZolam (XANAX) tablet 0.5 mg, 0.5 mg, Oral, 4 times per day  amLODIPine (NORVASC) tablet 5 mg, 5 mg, Oral, Daily  aspirin EC tablet 81 mg, 81 mg, Oral, Daily  busPIRone (BUSPAR) tablet 10 mg, 10 mg, Oral, BID  cloNIDine (CATAPRES) tablet 0.2 mg, 0.2 mg, Oral, TID  venlafaxine (EFFEXOR XR) extended release capsule 37.5 mg, 37.5 mg, Oral, Daily with breakfast  pantoprazole (PROTONIX) tablet 40 mg, 40 mg, Oral, BID AC  pregabalin (LYRICA) capsule 75 mg, 75 mg, Oral, Daily  sevelamer (RENVELA) packet 2.4 g, 2.4 g, Oral, TID WC  torsemide (DEMADEX) tablet 40 mg, 40 mg, Oral, 
  Vascular Surgery Progress Note        Chief Complaint: Consult follow up - malfunction RUORAL AVF    SUBJECTIVE:  still wants to go home for her upcoming birthday    OBJECTIVE    Physical  CURRENT VITALS:  BP (!) 175/84   Pulse (!) 107   Temp 98 °F (36.7 °C) (Oral)   Resp 18   Ht 1.702 m (5' 7\")   Wt 71.7 kg (158 lb)   LMP 09/01/2022   SpO2 95%   BMI 24.75 kg/m²   24 HR INTAKE/OUTPUT:    Intake/Output Summary (Last 24 hours) at 6/15/2025 0830  Last data filed at 6/15/2025 0500  Gross per 24 hour   Intake 626 ml   Output --   Net 626 ml     Left subclavian tunneled dialysis catheter placed 6/13. Dressing was saturated yesterday afternoon - reinforced and afternoon Heparin subcut dose held. Was dialyzed successfully yesterday as well.  Right arm with ace wrap intact. Hand warm. 2+ ulnar pulse, 1+ radial  Right upper arm AVF with + bruit/thrill.       Data  CBC:   Recent Labs     06/12/25  2014 06/13/25  0424 06/14/25  0606 06/15/25  0707   WBC 7.2 5.4 5.3 6.0   HGB 11.4* 11.3* 10.7* 10.9*   HCT 34.8* 34.6* 32.9* 33.9*   MCV 92.0 90.7 91.5 92.6    158 139 129*     BMP:   Recent Labs     06/12/25 2014 06/13/25 0424 06/14/25  0606 06/15/25  0707   * 133* 134* 138   K 5.1 4.9 4.8 4.4   CL 88* 88* 93* 98*   CO2 24 24 23 23   PHOS  --  9.2* 7.7* 5.9*   GLUCOSE 143* 132* 123* 121*   BUN 65* 68* 42* 24*   CREATININE 7.1* 7.6* 5.7* 4.0*   CALCIUM 8.5 8.2* 8.6 9.1   MG 1.90  --  2.00 2.00     Accucheck Glucoses:   Recent Labs     06/13/25  1645   POCGLU 139*     Hemodialysis Access Duplex Right 6/14/2025:   AV fistula of the right upper extremity. Stenosis noted at the proximal anastomosis, proximal segment and distal anastomosis/confluence     Current Inpatient Medications  Current Facility-Administered Medications: heparin (porcine) injection 3,600 Units, 3,600 Units, IntraCATHeter, PRN  ALPRAZolam (XANAX) tablet 0.5 mg, 0.5 mg, Oral, 4 times per day  amLODIPine (NORVASC) tablet 5 mg, 5 mg, Oral, 
4 Eyes Skin Assessment     NAME:  Kristine Ramirez  YOB: 1975  MEDICAL RECORD NUMBER:  [id number]    The patient is being assessed for  Admission    I agree that at least one RN has performed a thorough Head to Toe Skin Assessment on the patient. ALL assessment sites listed below have been assessed.      Areas assessed by both nurses:    Head, Face, Ears, Shoulders, Back, Chest, Arms, Elbows, Hands, Sacrum. Buttock, Coccyx, Ischium, and Legs. Feet and Heels        Does the Patient have a Wound? No noted wound(s)       Robbie Prevention initiated by RN: Yes  Wound Care Orders initiated by RN: No    Pressure Injury (Stage 3,4, Unstageable, DTI, NWPT, and Complex wounds) if present, place Wound referral order by RN under : No    New Ostomies, if present place, Ostomy referral order under : No     Nurse 1 eSignature: Electronically signed by Filipe Choudhary RN on 6/13/25 at 2:02 AM EDT    **SHARE this note so that the co-signing nurse can place an eSignature**    Nurse 2 eSignature: Electronically signed by Rachel Garcia RN on 6/13/25 at 2:03 AM EDT   
Discharge instructions and medications reviewed with patient. Patient voices understanding and denies further questions/ concerns at this time. Pt discharged home, via [facility].   
Home oxygen evaluation on hold as patient is in dialysis @ this time.   
[facility]  Progress Note    Reason for Consult: ESRD management, shortness of breath  Requesting Physician:  Dr. NATALIYA Barriga    CHIEF COMPLAINT: Fall    Subjective / interval history / medical decision making.  -tdc placed 6/13 then tolerated iHD.     History obtained from records and patient.    HISTORY OF PRESENT ILLNESS:                 Kristine Ramirez is a 49 y.o., female with significant past medical history of ESRD, on HD MWF at [facility] under our care, CVA, CHF, pericardial effusion, diabetes mellitus type 2, depression, hypertension, migraine headaches, hyperlipidemia who presents with fall and hitting her head.  She missed her dialysis last Wednesday.  I am asked to see the patient with regards to her HD need.  SpO2 was down to 87% on room air yesterday.  Chest x-ray showed pulmonary edema.  Potassium today of 4.9 and serum bicarbonate of 24.  Systolic blood pressure ranging from 150s to 170s range.  Attempted to cannulate right AV fistula today but multiple clots and unable to run HD.      Past Medical History:     has a past medical history of Acute respiratory failure due to COVID-19 ([facility]), Arterial ischemic stroke, ICA, left, acute ([facility]), Blind in both eyes, Cerebral artery occlusion with cerebral infarction ([facility]), CHF (congestive heart failure) ([facility]), Chronic kidney disease, COPD (chronic obstructive pulmonary disease) ([facility]), Depression, Diabetes mellitus out of control, Diabetes mellitus, type II ([facility]), Diabetic neuropathy associated with type 2 diabetes mellitus ([facility]), Generalized headaches, Hypertension, Infertility, Insomnia, Migraine headache, Mixed hyperlipidemia, Otitis media, Pelvic abscess in female, Pneumonia, Stroke ([facility]), and Stroke ([facility]).   Past Surgical History:     has a past surgical history that includes Cervix surgery; eye surgery; Foot surgery (Right); Foot surgery (Bilateral); Foot surgery (Left); IR TUNNELED CVC PLACE WO SQ PORT/PUMP > 
[facility]  Progress Note    Reason for Consult: ESRD management, shortness of breath  Requesting Physician:  Dr. NATALIYA Barriga    CHIEF COMPLAINT: Fall    Subjective / interval history / medical decision making.  - For HD today.  Planning for DC after  - TDC placed on 6/13/2025    History obtained from records and patient.    HISTORY OF PRESENT ILLNESS:                 Kristine Ramirez is a 49 y.o., female with significant past medical history of ESRD, on HD MWF at [facility] under our care, CVA, CHF, pericardial effusion, diabetes mellitus type 2, depression, hypertension, migraine headaches, hyperlipidemia who presents with fall and hitting her head.  She missed her dialysis last Wednesday.  I am asked to see the patient with regards to her HD need.  SpO2 was down to 87% on room air yesterday.  Chest x-ray showed pulmonary edema.  Potassium today of 4.9 and serum bicarbonate of 24.  Systolic blood pressure ranging from 150s to 170s range.  Attempted to cannulate right AV fistula today but multiple clots and unable to run HD.      Past Medical History:     has a past medical history of Acute respiratory failure due to COVID-19 ([facility]), Arterial ischemic stroke, ICA, left, acute ([facility]), Blind in both eyes, Cerebral artery occlusion with cerebral infarction ([facility]), CHF (congestive heart failure) ([facility]), Chronic kidney disease, COPD (chronic obstructive pulmonary disease) ([facility]), Depression, Diabetes mellitus out of control, Diabetes mellitus, type II ([facility]), Diabetic neuropathy associated with type 2 diabetes mellitus ([facility]), Generalized headaches, Hypertension, Infertility, Insomnia, Migraine headache, Mixed hyperlipidemia, Otitis media, Pelvic abscess in female, Pneumonia, Stroke ([facility]), and Stroke ([facility]).   Past Surgical History:     has a past surgical history that includes Cervix surgery; eye surgery; Foot surgery (Right); Foot surgery (Bilateral); Foot surgery (Left); IR TUNNELED 
[facility]  Progress Note    Reason for Consult: ESRD management, shortness of breath  Requesting Physician:  Dr. NATALIYA Barriga    CHIEF COMPLAINT: Fall    Subjective / interval history / medical decision making.  -tdc placed 6/13 then tolerated iHD.     History obtained from records and patient.    HISTORY OF PRESENT ILLNESS:                 Kristine Ramirez is a 49 y.o., female with significant past medical history of ESRD, on HD MWF at [facility] under our care, CVA, CHF, pericardial effusion, diabetes mellitus type 2, depression, hypertension, migraine headaches, hyperlipidemia who presents with fall and hitting her head.  She missed her dialysis last Wednesday.  I am asked to see the patient with regards to her HD need.  SpO2 was down to 87% on room air yesterday.  Chest x-ray showed pulmonary edema.  Potassium today of 4.9 and serum bicarbonate of 24.  Systolic blood pressure ranging from 150s to 170s range.  Attempted to cannulate right AV fistula today but multiple clots and unable to run HD.      Past Medical History:     has a past medical history of Acute respiratory failure due to COVID-19 ([facility]), Arterial ischemic stroke, ICA, left, acute ([facility]), Blind in both eyes, Cerebral artery occlusion with cerebral infarction ([facility]), CHF (congestive heart failure) ([facility]), Chronic kidney disease, COPD (chronic obstructive pulmonary disease) ([facility]), Depression, Diabetes mellitus out of control, Diabetes mellitus, type II ([facility]), Diabetic neuropathy associated with type 2 diabetes mellitus ([facility]), Generalized headaches, Hypertension, Infertility, Insomnia, Migraine headache, Mixed hyperlipidemia, Otitis media, Pelvic abscess in female, Pneumonia, Stroke ([facility]), and Stroke ([facility]).   Past Surgical History:     has a past surgical history that includes Cervix surgery; eye surgery; Foot surgery (Right); Foot surgery (Bilateral); Foot surgery (Left); IR TUNNELED CVC PLACE WO SQ PORT/PUMP > 
Not able to access AVG. Access with bruising and hardened areas. Pulled out multiple large strands of clots. Pt states they were having issues with access at clinic. Dr. Hilliard aware. Pt will be receiving a TDC and will have dialysis later today.   
Patient is not getting discharged. Home O2 eval on hold until tomorrow.   
Pharmacy Home Medication Reconciliation Note    A medication reconciliation has been completed for Kristine Ramirez 1975    [facility], [place], [place]  Information provided by: patient    The patient's home medication list is as follows:  No current facility-administered medications on file prior to encounter.     Current Outpatient Medications on File Prior to Encounter   Medication Sig Dispense Refill    torsemide (DEMADEX) 20 MG tablet Take 2 tablets by mouth daily 180 tablet 3    ALPRAZolam (ALPRAZOLAM XR) 2 MG extended release tablet Take 1 tablet by mouth every morning for 90 days. Max Daily Amount: 2 mg 90 tablet 0    albuterol sulfate HFA (PROVENTIL;VENTOLIN;PROAIR) 108 (90 Base) MCG/ACT inhaler Inhale 2 puffs into the lungs every 6 hours as needed for Wheezing or Shortness of Breath 18 g 5    umeclidinium-vilanterol (ANORO ELLIPTA) 62.5-25 MCG/ACT inhaler Inhale 1 puff into the lungs daily 60 each 5    cloNIDine (CATAPRES) 0.2 MG tablet Take 1 tablet by mouth in the morning, at noon, and at bedtime 270 tablet 1    pregabalin (LYRICA) 75 MG capsule Take 1 capsule by mouth daily for 90 days. Max Daily Amount: 75 mg 90 capsule 0    desvenlafaxine succinate (PRISTIQ) 25 MG TB24 extended release tablet Take 1 tablet by mouth daily 90 tablet 1    busPIRone (BUSPAR) 10 MG tablet Take 1 tablet by mouth 2 times daily 180 tablet 1    pantoprazole (PROTONIX) 40 MG tablet Take 1 tablet by mouth 2 times daily (before meals) 180 tablet 0    amLODIPine (NORVASC) 5 MG tablet Take 1 tablet by mouth daily      Dulaglutide 4.5 MG/0.5ML SOAJ Inject 4.5 mg into the skin every 7 days (Patient taking differently: Inject 4.5 mg into the skin every 7 days Fridays.) 2 mL 5    sevelamer (RENVELA) 2.4 g PACK packet Take 2.4 g by mouth 3 times daily (with meals)      aspirin 81 MG EC tablet Take 1 tablet by mouth daily 30 tablet 11    Handicap Placard MISC by Does not apply route Expires on 
Physical Therapy    [facility] - Inpatient Rehabilitation Department   Phone: [phone]    Physical Therapy    [x] Initial Evaluation            [] Daily Treatment Note         [] Discharge Summary      Patient: Kristine Ramirez   : 1975   MRN: [id number]   Date of Service:  2025  Admitting Diagnosis: Hypervolemia  Current Admission Summary: Per H&P:\" Kristine Ramirez is a 49 y.o. female with history of stroke with residual right sided deficits who presented to ED for evaluation of 3 falls from bed in the last 24 hours.  Patient reports she has slipped out of bed each time and cannot get up due to chronic right-sided weakness.  Patient has ESRD on HD MWF.  Patient reports last dialysis was on .  Patient once hypoxic upon arrival with SpO2 87% on RA.  Patient was placed on 2L O2 with good response.  Patient not on O2 at baseline.  Patient reports some shortness of breath.  She denies fever, chest pain, cough, GI symptoms.  She denies head trauma, headache, neck pain or stiffness, changes in vision, difficulty swallowing, numbness/tingling extremities, new focal weakness.  She denies any other complaints or concerns at this time.\"     Past Medical History:  has a past medical history of Acute respiratory failure due to COVID-19 ([facility]), Arterial ischemic stroke, ICA, left, acute ([facility]), Blind in both eyes, Cerebral artery occlusion with cerebral infarction ([facility]), CHF (congestive heart failure) ([facility]), Chronic kidney disease, COPD (chronic obstructive pulmonary disease) ([facility]), Depression, Diabetes mellitus out of control, Diabetes mellitus, type II ([facility]), Diabetic neuropathy associated with type 2 diabetes mellitus ([facility]), Generalized headaches, Hypertension, Infertility, Insomnia, Migraine headache, Mixed hyperlipidemia, Otitis media, Pelvic abscess in female, Pneumonia, Stroke ([facility]), and Stroke ([facility]).  Past Surgical History:  has a past surgical history that includes Cervix surgery; 
Physical Therapy    [facility] - Inpatient Rehabilitation Department   Phone: [phone]    Physical Therapy    [] Initial Evaluation            [x] Daily Treatment Note         [] Discharge Summary      Patient: Kristine Ramirez   : 1975   MRN: [id number]   Date of Service:  2025  Admitting Diagnosis: Hypervolemia  Current Admission Summary: Per H&P:\" Kristine Ramirez is a 49 y.o. female with history of stroke with residual right sided deficits who presented to ED for evaluation of 3 falls from bed in the last 24 hours.  Patient reports she has slipped out of bed each time and cannot get up due to chronic right-sided weakness.  Patient has ESRD on HD MWF.  Patient reports last dialysis was on .  Patient once hypoxic upon arrival with SpO2 87% on RA.  Patient was placed on 2L O2 with good response.  Patient not on O2 at baseline.  Patient reports some shortness of breath.  She denies fever, chest pain, cough, GI symptoms.  She denies head trauma, headache, neck pain or stiffness, changes in vision, difficulty swallowing, numbness/tingling extremities, new focal weakness.  She denies any other complaints or concerns at this time.\"     Past Medical History:  has a past medical history of Acute respiratory failure due to COVID-19 ([facility]), Arterial ischemic stroke, ICA, left, acute ([facility]), Blind in both eyes, Cerebral artery occlusion with cerebral infarction ([facility]), CHF (congestive heart failure) ([facility]), Chronic kidney disease, COPD (chronic obstructive pulmonary disease) ([facility]), Depression, Diabetes mellitus out of control, Diabetes mellitus, type II ([facility]), Diabetic neuropathy associated with type 2 diabetes mellitus ([facility]), Generalized headaches, Hypertension, Infertility, Insomnia, Migraine headache, Mixed hyperlipidemia, Otitis media, Pelvic abscess in female, Pneumonia, Stroke ([facility]), and Stroke ([facility]).  Past Surgical History:  has a past surgical history that includes Cervix surgery; 
Physical/Occupational Therapy  Kristine Ramirez  Pt on PT/OT caseload. Pt currently DAWNA at this time. PT/OT will follow-up with pt as schedule and medical status allow.  Thank you,  Kosta Nava, PT, DPT, [id number]      
Pt sleeping. O2 sat 77-78% on RA. 2L NC applied, and O2 sat 91-93% while sleeping. Dr. Linus rick served  
Received call from floor RN requesting for HD RN to come and evaluate pt's LFTIJ CVC dressing that is saturated wit blood, CVC placed yesterday.    Dressing very bloody, dressing changed and site cleaned per protocol, pt tolerated well, dressing reinforced  
Shift assessment completed and charted. VSS. A/o x4. Standard safety measures in place. Patient up to chair for breakfast steady x2. SpO2 > 90% on 2L nasal cannula, patient reports she does not use oxygen at home. Right arm AV graft has bruit and thrill intact, arm is swollen compared to LUE. Patient reports some tingling but denies numbness or pain. Pt stable and denied needs when writer left room.    12:55 PM  Patient NPO per nephrology for IR catheter line to be placed. Patient was unable to have dialysis due to RUE AV fistula. RUE dressing where they attempted access is C/D/I with no signs of bleeding.  
Shift assessment completed. VSS. AM medications administered as ordered. Alert and oriented, delayed responses. Pt up in chair working with RT for home O2 eval, call light within reach.  
Treatment time: 3 HOURS    Net UF: 2.5 liters    Pre weight: 76.7 kg  Post weight: 74.2 kg    Access used: Left CVC  Access function: Access site located on the left chest wall had clean dry and intact CVC dressing. No signs of infection noted. No redness, hematoma nor swelling we observed. No discharges was seen. Both ports had good flow. Cleaned site and changed dressing septically.    Medications or blood products given: heparin dwell    Regular outpatient schedule: M,W,F    Summary of response to treatment: 12:50: Treatment set at 2.5 liters for 3 hours via Left CVC. Access site located on the left chest wall had clean dry and intact CVC dressing. No signs of infection noted. No redness, hematoma nor swelling we observed. No discharges was seen. Both ports had good flow. Cleaned site and changed dressing septically. Parameters set accordingly. Hooked to HD machine. Monitored from time to time. 15:50; Treatment completed. Returned Blood aseptically. Hd tolerated well.      Copy of dialysis treatment record placed in chart, to be scanned into EMR.  
Treatment time: 3 Hours    Net UF: 1.5 liters doctor Cherri was aware of it    Pre weight: 71.5 kg  Post weight: 70 kg    Access used: Left CVC  Access function: Access site located on the Left chest wall had pressure dressing just put on it today as it was bleeding according to her primary Nurse. Both ports had good flow. dressing changed was not done as it was just changed today.     Medications or blood products given: heparin dwell    Regular outpatient schedule: M,W,F    Summary of response to treatment: 14:40: Treatment set at 1.5 liters for 3 hours as ordered via Left CVC. Access site located on the Left chest wall had pressure dressing just put on it today as it was bleeding according to her primary Nurse. Both ports had good flow. dressing changed was not done as it was just changed today. Parameters set accordingly. Hooked to Hd machine. Monitored from time to time. 17:40 :treatment completed Returned blood aseptically. HD tolerated dwell.     Copy of dialysis treatment record placed in chart, to be scanned into EMR.  
Treatment time: 3 hrs    Net UF: 4000 ml    Pre weight: 70.8 kg  Post weight: 66.8 kg    Access used: Lt CW TDC  Access function: Well tolerated, 400 BFR    Medications or blood products given: Zofran 4 mg    Regular outpatient schedule: MWF    Summary of response to treatment: Pt tolerated fair. Pt had an emesis at start of treatment. States she was feeling sick from meds given in IR for line placement. Pt remained stable throughout entire treatment.     
Tunneled dialysis catheter that was placed yesterday was bleeding. Dressing saturated. Dialysis nurse called to check and reinforce dressing. MD made aware. This nurse asked MD if he would like to hold 2 PM heparin SQ injection. MD said to hold that dose.   
Vascular Progress Note    6/16/2025 9:35 AM    Chief complaint / Reason for visit : right arm AVF malfunction     Subjective:  Patient resting in bed.  She has no new complaints.  She is hoping to discharge home today before her birthday tomorrow.  VSS, afebrile.     Vital Signs: BP (!) 167/81   Pulse (!) 109   Temp 97.5 °F (36.4 °C) (Oral)   Resp 19   Ht 1.702 m (5' 7\")   Wt 76.7 kg (169 lb)   LMP 09/01/2022   SpO2 96%   BMI 26.47 kg/m²  O2 Flow Rate (L/min): 2 L/min   I/O:    Intake/Output Summary (Last 24 hours) at 6/16/2025 0935  Last data filed at 6/16/2025 0343  Gross per 24 hour   Intake 420 ml   Output 100 ml   Net 320 ml       Physical Exam:   General: no apparent distress, appears stated age  Chest/Lungs: no accessory muscle use  Cardiac:  regular rate and rhythm, + left chest TDC  Abdomen:  abdomen is soft without significant tenderness  Vascular:  right arm AVF + bruit and thrill   Extremities: right hand warm, trace edema    Labs:   Lab Results   Component Value Date/Time     06/16/2025 05:20 AM    K 4.7 06/16/2025 05:20 AM    K 5.1 06/12/2025 08:14 PM    CL 99 06/16/2025 05:20 AM    CO2 25 06/16/2025 05:20 AM    BUN 32 06/16/2025 05:20 AM    CREATININE 4.8 06/16/2025 05:20 AM    GFRAA 6 09/07/2022 06:01 AM    GFRAA >60 11/09/2011 02:25 PM    LABGLOM 10 06/16/2025 05:20 AM    LABGLOM 7 04/10/2024 01:14 PM    GLUCOSE 142 06/16/2025 05:20 AM    PHOS 6.1 06/16/2025 05:20 AM    MG 1.99 06/16/2025 05:20 AM    CALCIUM 9.2 06/16/2025 05:20 AM     Lab Results   Component Value Date/Time    WBC 4.9 06/16/2025 05:20 AM    RBC 3.71 06/16/2025 05:20 AM    HGB 11.1 06/16/2025 05:20 AM    HCT 33.9 06/16/2025 05:20 AM    MCV 91.3 06/16/2025 05:20 AM    RDW 17.2 06/16/2025 05:20 AM     06/16/2025 05:20 AM     Lab Results   Component Value Date    INR 1.38 (H) 03/04/2025    PROTIME 17.2 (H) 03/04/2025        Imaging:    Right arm HD scan 6/15/25:  Interpretation Summary  Show Result Comparison     
been smoking cigarettes. She has a 15 pack-year smoking history. She has never used smokeless tobacco. She reports that she does not drink alcohol and does not use drugs.   Family History:   family history includes Alcohol Abuse in her maternal grandfather and paternal grandfather; Colon Cancer in her father; Diabetes in her father, mother, paternal aunt, paternal grandmother, paternal uncle, and sister; High Blood Pressure in her father; Other (age of onset: 67) in her mother.     REVIEW OF SYSTEMS:    System review was done , pertinent positives are mentioned in the HPI    Positive fatigue  No chest pain nor palpitations  No SOB, coughing nor hemoptysis  No abdominal pain, nausea, vomiting nor diarrhea  No fever/chills  Some leg swelling  No change in urine output nor gross hematuria    PHYSICAL EXAM:    Vitals:  BP (!) 167/78   Pulse (!) 101   Temp 98.1 °F (36.7 °C) (Oral)   Resp 16   Ht 1.702 m (5' 7\")   Wt 76.7 kg (169 lb 1.5 oz)   LMP 09/01/2022   SpO2 97%   BMI 26.48 kg/m²       CONSTITUTIONAL:  awake, alert, cooperative, feels tired  EYES:  Lids and lashes normal, pupils equal, round   NECK:  Supple, symmetrical, trachea midline, no adenopathy, thyroid symmetric, not enlarged and no tenderness, skin normal  HEMATOLOGIC/LYMPHATICS:  no cervical lymphadenopathy  LUNGS:  No increased work of breathing, rhonchi bilaterally  CARDIOVASCULAR:  Normal apical impulse, regular rate and rhythm, normal S1 and S2  ABDOMEN:  Nomal bowel sounds, soft, non-distended, non-tender, no masses palpated, no hepatosplenomegaly  MUSCULOSKELETAL:  There is no redness, warmth, or swelling of the joints.  1+ edema      DATA:    CBC:   Lab Results   Component Value Date/Time    WBC 3.8 06/17/2025 04:55 AM    RBC 3.57 06/17/2025 04:55 AM    HGB 10.7 06/17/2025 04:55 AM    HCT 32.7 06/17/2025 04:55 AM    MCV 91.3 06/17/2025 04:55 AM    MCH 29.9 06/17/2025 04:55 AM    MCHC 32.7 06/17/2025 04:55 AM    RDW 17.2 06/17/2025 04:55 AM 
for input(s): \"TROPONINI\" in the last 72 hours.  BNP: No results for input(s): \"BNP\" in the last 72 hours.  Lipids: No results for input(s): \"CHOL\", \"HDL\" in the last 72 hours.    Invalid input(s): \"LDLCALCU\", \"TRIGLYCERIDE\"  ABGs:  No results for input(s): \"PHART\", \"YIQ7VJS\", \"PO2ART\", \"TND7NFB\", \"BEART\", \"THGBART\", \"O6UYQJZL\", \"JQC1SLM\" in the last 72 hours.    INR: No results for input(s): \"INR\" in the last 72 hours.  Lactate: No results for input(s): \"LACTATE\" in the last 72 hours.  Cultures:  -----------------------------------------------------------------  RAD:   CT CERVICAL SPINE WO CONTRAST   Final Result   No acute fracture or subluxation of the cervical spine.      Bilateral pleural effusions.      Soft tissue swelling in the anterior neck/chest wall.         CT Head W/O Contrast   Final Result   No acute intracranial abnormality.      Mild parenchymal volume loss.      Moderate chronic microvascular disease.         XR CHEST PORTABLE   Final Result   1. Worsening pulmonary edema and moderate right-sided pleural effusion, consistent with fluid overload.            IR TUNNELED CVC PLACE WO SQ PORT/PUMP > 5 YEARS    (Results Pending)       Medications:     Scheduled Meds:   ALPRAZolam  0.5 mg Oral 4 times per day    amLODIPine  5 mg Oral Daily    aspirin  81 mg Oral Daily    busPIRone  10 mg Oral BID    cloNIDine  0.2 mg Oral TID    venlafaxine  37.5 mg Oral Daily with breakfast    pantoprazole  40 mg Oral BID AC    pregabalin  75 mg Oral Daily    sevelamer  2.4 g Oral TID WC    torsemide  40 mg Oral Daily    tiotropium-olodaterol  2 puff Inhalation Daily    sodium chloride flush  5-40 mL IntraVENous 2 times per day    heparin (porcine)  5,000 Units SubCUTAneous 3 times per day     Continuous Infusions:   sodium chloride       PRN Meds:sodium chloride flush, sodium chloride, ondansetron, senna, acetaminophen **OR** acetaminophen, albuterol

## 2025-06-17 NOTE — PLAN OF CARE
Problem: Chronic Conditions and Co-morbidities  Goal: Patient's chronic conditions and co-morbidity symptoms are monitored and maintained or improved  6/17/2025 1030 by Johana Salcedo RN  Outcome: Progressing  6/16/2025 2312 by Kian Cruz RN  Outcome: Progressing     Problem: Discharge Planning  Goal: Discharge to home or other facility with appropriate resources  6/17/2025 1030 by Johana Salcedo RN  Outcome: Progressing  6/16/2025 2312 by Kian Cruz RN  Outcome: Progressing     Problem: Pain  Goal: Verbalizes/displays adequate comfort level or baseline comfort level  6/17/2025 1030 by Johana Salcedo RN  Outcome: Progressing  6/16/2025 2312 by Kian Cruz RN  Outcome: Progressing     Problem: Skin/Tissue Integrity  Goal: Skin integrity remains intact  Description: 1.  Monitor for areas of redness and/or skin breakdown2.  Assess vascular access sites hourly3.  Every 4-6 hours minimum:  Change oxygen saturation probe site4.  Every 4-6 hours:  If on nasal continuous positive airway pressure, respiratory therapy assess nares and determine need for appliance change or resting period  6/17/2025 1030 by Johana Salcedo RN  Outcome: Progressing  6/16/2025 2312 by Kian Cruz RN  Outcome: Progressing     Problem: Safety - Adult  Goal: Free from fall injury  6/17/2025 1030 by Johana Salcedo RN  Outcome: Progressing  6/16/2025 2312 by Kian Cruz RN  Outcome: Progressing     Problem: Respiratory - Adult  Goal: Achieves optimal ventilation and oxygenation  6/17/2025 1030 by Johana Salcedo RN  Outcome: Progressing  6/16/2025 2312 by Kian Cruz RN  Outcome: Progressing     Problem: Skin/Tissue Integrity - Adult  Goal: Skin integrity remains intact  Description: 1.  Monitor for areas of redness and/or skin breakdown2.  Assess vascular access sites hourly3.  Every 4-6 hours minimum:  Change oxygen saturation probe site4.  Every 4-6 hours:  If on nasal continuous positive airway pressure,

## 2025-06-18 ENCOUNTER — APPOINTMENT (OUTPATIENT)
Dept: CT IMAGING | Age: 50
DRG: 091 | End: 2025-06-18
Payer: COMMERCIAL

## 2025-06-18 ENCOUNTER — APPOINTMENT (OUTPATIENT)
Dept: GENERAL RADIOLOGY | Age: 50
DRG: 091 | End: 2025-06-18
Payer: COMMERCIAL

## 2025-06-18 ENCOUNTER — TELEPHONE (OUTPATIENT)
Dept: FAMILY MEDICINE CLINIC | Age: 50
End: 2025-06-18

## 2025-06-18 ENCOUNTER — HOSPITAL ENCOUNTER (INPATIENT)
Age: 50
LOS: 1 days | Discharge: HOME HEALTH | DRG: 091 | End: 2025-06-21
Attending: STUDENT IN AN ORGANIZED HEALTH CARE EDUCATION/TRAINING PROGRAM
Payer: COMMERCIAL

## 2025-06-18 DIAGNOSIS — J81.0 ACUTE PULMONARY EDEMA (HCC): ICD-10-CM

## 2025-06-18 DIAGNOSIS — J96.01 ACUTE RESPIRATORY FAILURE WITH HYPOXIA (HCC): Primary | ICD-10-CM

## 2025-06-18 DIAGNOSIS — Z99.2 ESRD (END STAGE RENAL DISEASE) ON DIALYSIS (HCC): ICD-10-CM

## 2025-06-18 DIAGNOSIS — N18.6 ESRD (END STAGE RENAL DISEASE) ON DIALYSIS (HCC): ICD-10-CM
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
## 2025-06-18 LAB
ANION GAP SERPL CALCULATED.3IONS-SCNC: 15 MMOL/L (ref 3–16)
BASE EXCESS BLDV CALC-SCNC: -0.3 MMOL/L (ref -3–3)
BASOPHILS # BLD: 0.1 K/UL (ref 0–0.2)
BASOPHILS NFR BLD: 2.3 %
BUN SERPL-MCNC: 28 MG/DL (ref 7–20)
CALCIUM SERPL-MCNC: 9 MG/DL (ref 8.3–10.6)
CHLORIDE SERPL-SCNC: 100 MMOL/L (ref 99–110)
CO2 BLDV-SCNC: 61 MMOL/L
CO2 SERPL-SCNC: 22 MMOL/L (ref 21–32)
COHGB MFR BLDV: 3.6 % (ref 0–1.5)
CREAT SERPL-MCNC: 4.6 MG/DL (ref 0.6–1.1)
DEPRECATED RDW RBC AUTO: 17 % (ref 12.4–15.4)
EKG ATRIAL RATE: 99 BPM
EKG DIAGNOSIS: NORMAL
EKG P AXIS: 22 DEGREES
EKG P-R INTERVAL: 182 MS
EKG Q-T INTERVAL: 350 MS
EKG QRS DURATION: 86 MS
EKG QTC CALCULATION (BAZETT): 449 MS
EKG R AXIS: 7 DEGREES
EKG T AXIS: 63 DEGREES
EKG VENTRICULAR RATE: 99 BPM
EOSINOPHIL # BLD: 0.1 K/UL (ref 0–0.6)
EOSINOPHIL NFR BLD: 3.2 %
GFR SERPLBLD CREATININE-BSD FMLA CKD-EPI: 11 ML/MIN/{1.73_M2}
GLUCOSE BLD-MCNC: 113 MG/DL (ref 70–99)
GLUCOSE SERPL-MCNC: 130 MG/DL (ref 70–99)
HCO3 BLDV-SCNC: 25.6 MMOL/L (ref 23–29)
HCT VFR BLD AUTO: 34 % (ref 36–48)
HGB BLD-MCNC: 11 G/DL (ref 12–16)
LYMPHOCYTES # BLD: 0.4 K/UL (ref 1–5.1)
LYMPHOCYTES NFR BLD: 8.8 %
MCH RBC QN AUTO: 29.7 PG (ref 26–34)
MCHC RBC AUTO-ENTMCNC: 32.4 G/DL (ref 31–36)
MCV RBC AUTO: 91.5 FL (ref 80–100)
METHGB MFR BLDV: 0.5 %
MONOCYTES # BLD: 0.4 K/UL (ref 0–1.3)
MONOCYTES NFR BLD: 9.4 %
NEUTROPHILS # BLD: 3.4 K/UL (ref 1.7–7.7)
NEUTROPHILS NFR BLD: 76.3 %
NT-PROBNP SERPL-MCNC: ABNORMAL PG/ML (ref 0–124)
O2 CT VFR BLDV CALC: 14 VOL %
O2 THERAPY: ABNORMAL
PCO2 BLDV: 46.6 MMHG (ref 40–50)
PERFORMED ON: ABNORMAL
PH BLDV: 7.35 [PH] (ref 7.35–7.45)
PLATELET # BLD AUTO: 136 K/UL (ref 135–450)
PMV BLD AUTO: 8 FL (ref 5–10.5)
PO2 BLDV: 104 MMHG (ref 25–40)
POTASSIUM SERPL-SCNC: 4.6 MMOL/L (ref 3.5–5.1)
RBC # BLD AUTO: 3.71 M/UL (ref 4–5.2)
SAO2 % BLDV: 98 %
SODIUM SERPL-SCNC: 137 MMOL/L (ref 136–145)
TROPONIN, HIGH SENSITIVITY: 415 NG/L (ref 0–14)
TROPONIN, HIGH SENSITIVITY: 416 NG/L (ref 0–14)
WBC # BLD AUTO: 4.4 K/UL (ref 4–11)

## 2025-06-18 PROCEDURE — 6370000000 HC RX 637 (ALT 250 FOR IP)

## 2025-06-18 PROCEDURE — 6360000002 HC RX W HCPCS

## 2025-06-18 PROCEDURE — 2500000003 HC RX 250 WO HCPCS

## 2025-06-18 PROCEDURE — 80048 BASIC METABOLIC PNL TOTAL CA: CPT

## 2025-06-18 PROCEDURE — 93005 ELECTROCARDIOGRAM TRACING: CPT | Performed by: NURSE PRACTITIONER

## 2025-06-18 PROCEDURE — 99285 EMERGENCY DEPT VISIT HI MDM: CPT

## 2025-06-18 PROCEDURE — G0378 HOSPITAL OBSERVATION PER HR: HCPCS

## 2025-06-18 PROCEDURE — 93010 ELECTROCARDIOGRAM REPORT: CPT | Performed by: INTERNAL MEDICINE

## 2025-06-18 PROCEDURE — 36415 COLL VENOUS BLD VENIPUNCTURE: CPT

## 2025-06-18 PROCEDURE — 72125 CT NECK SPINE W/O DYE: CPT

## 2025-06-18 PROCEDURE — 73030 X-RAY EXAM OF SHOULDER: CPT

## 2025-06-18 PROCEDURE — 71045 X-RAY EXAM CHEST 1 VIEW: CPT

## 2025-06-18 PROCEDURE — 82803 BLOOD GASES ANY COMBINATION: CPT

## 2025-06-18 PROCEDURE — 85025 COMPLETE CBC W/AUTO DIFF WBC: CPT

## 2025-06-18 PROCEDURE — 90935 HEMODIALYSIS ONE EVALUATION: CPT

## 2025-06-18 PROCEDURE — 84484 ASSAY OF TROPONIN QUANT: CPT

## 2025-06-18 PROCEDURE — 6370000000 HC RX 637 (ALT 250 FOR IP): Performed by: HOSPITALIST

## 2025-06-18 PROCEDURE — 96372 THER/PROPH/DIAG INJ SC/IM: CPT

## 2025-06-18 PROCEDURE — 83880 ASSAY OF NATRIURETIC PEPTIDE: CPT

## 2025-06-18 PROCEDURE — 5A1D70Z PERFORMANCE OF URINARY FILTRATION, INTERMITTENT, LESS THAN 6 HOURS PER DAY: ICD-10-PCS | Performed by: INTERNAL MEDICINE

## 2025-06-18 PROCEDURE — 70450 CT HEAD/BRAIN W/O DYE: CPT

## 2025-06-18 RX ORDER — HEPARIN SODIUM 5000 [USP'U]/ML
5000 INJECTION, SOLUTION INTRAVENOUS; SUBCUTANEOUS EVERY 8 HOURS SCHEDULED
Status: DISCONTINUED | OUTPATIENT
Start: 2025-06-18 | End: 2025-06-21 | Stop reason: HOSPADM

## 2025-06-18 RX ORDER — ACETAMINOPHEN 650 MG/1
650 SUPPOSITORY RECTAL EVERY 6 HOURS PRN
Status: DISCONTINUED | OUTPATIENT
Start: 2025-06-18 | End: 2025-06-21 | Stop reason: HOSPADM

## 2025-06-18 RX ORDER — PANTOPRAZOLE SODIUM 40 MG/1
40 TABLET, DELAYED RELEASE ORAL
Status: DISCONTINUED | OUTPATIENT
Start: 2025-06-18 | End: 2025-06-21 | Stop reason: HOSPADM

## 2025-06-18 RX ORDER — ALBUTEROL SULFATE 90 UG/1
2 INHALANT RESPIRATORY (INHALATION) EVERY 6 HOURS PRN
Status: DISCONTINUED | OUTPATIENT
Start: 2025-06-18 | End: 2025-06-21 | Stop reason: HOSPADM

## 2025-06-18 RX ORDER — CLONIDINE HYDROCHLORIDE 0.1 MG/1
0.1 TABLET ORAL EVERY 8 HOURS PRN
Status: DISCONTINUED | OUTPATIENT
Start: 2025-06-18 | End: 2025-06-21 | Stop reason: HOSPADM

## 2025-06-18 RX ORDER — SODIUM CHLORIDE 9 MG/ML
INJECTION, SOLUTION INTRAVENOUS PRN
Status: DISCONTINUED | OUTPATIENT
Start: 2025-06-18 | End: 2025-06-21 | Stop reason: HOSPADM

## 2025-06-18 RX ORDER — OXYCODONE HYDROCHLORIDE 5 MG/1
5 TABLET ORAL EVERY 6 HOURS PRN
Status: DISCONTINUED | OUTPATIENT
Start: 2025-06-18 | End: 2025-06-21 | Stop reason: HOSPADM

## 2025-06-18 RX ORDER — SODIUM CHLORIDE 0.9 % (FLUSH) 0.9 %
5-40 SYRINGE (ML) INJECTION PRN
Status: DISCONTINUED | OUTPATIENT
Start: 2025-06-18 | End: 2025-06-21 | Stop reason: HOSPADM

## 2025-06-18 RX ORDER — ONDANSETRON 2 MG/ML
4 INJECTION INTRAMUSCULAR; INTRAVENOUS EVERY 6 HOURS PRN
Status: DISCONTINUED | OUTPATIENT
Start: 2025-06-18 | End: 2025-06-21 | Stop reason: HOSPADM

## 2025-06-18 RX ORDER — AMLODIPINE BESYLATE 5 MG/1
10 TABLET ORAL DAILY
Status: DISCONTINUED | OUTPATIENT
Start: 2025-06-18 | End: 2025-06-21 | Stop reason: HOSPADM

## 2025-06-18 RX ORDER — POLYETHYLENE GLYCOL 3350 17 G/17G
17 POWDER, FOR SOLUTION ORAL DAILY PRN
Status: DISCONTINUED | OUTPATIENT
Start: 2025-06-18 | End: 2025-06-21 | Stop reason: HOSPADM

## 2025-06-18 RX ORDER — ACETAMINOPHEN 325 MG/1
650 TABLET ORAL EVERY 6 HOURS PRN
Status: DISCONTINUED | OUTPATIENT
Start: 2025-06-18 | End: 2025-06-21 | Stop reason: HOSPADM

## 2025-06-18 RX ORDER — ONDANSETRON 4 MG/1
4 TABLET, ORALLY DISINTEGRATING ORAL EVERY 8 HOURS PRN
Status: DISCONTINUED | OUTPATIENT
Start: 2025-06-18 | End: 2025-06-21 | Stop reason: HOSPADM

## 2025-06-18 RX ORDER — TORSEMIDE 20 MG/1
40 TABLET ORAL DAILY
Status: DISCONTINUED | OUTPATIENT
Start: 2025-06-18 | End: 2025-06-21 | Stop reason: HOSPADM

## 2025-06-18 RX ORDER — ASPIRIN 81 MG/1
81 TABLET ORAL DAILY
Status: DISCONTINUED | OUTPATIENT
Start: 2025-06-18 | End: 2025-06-21 | Stop reason: HOSPADM

## 2025-06-18 RX ORDER — SODIUM CHLORIDE 0.9 % (FLUSH) 0.9 %
5-40 SYRINGE (ML) INJECTION EVERY 12 HOURS SCHEDULED
Status: DISCONTINUED | OUTPATIENT
Start: 2025-06-18 | End: 2025-06-21 | Stop reason: HOSPADM

## 2025-06-18 RX ADMIN — SODIUM CHLORIDE, PRESERVATIVE FREE 10 ML: 5 INJECTION INTRAVENOUS at 20:04

## 2025-06-18 RX ADMIN — PANTOPRAZOLE SODIUM 40 MG: 40 TABLET, DELAYED RELEASE ORAL at 14:27

## 2025-06-18 RX ADMIN — ACETAMINOPHEN 650 MG: 325 TABLET ORAL at 21:43

## 2025-06-18 RX ADMIN — ASPIRIN 81 MG: 81 TABLET, DELAYED RELEASE ORAL at 10:45

## 2025-06-18 RX ADMIN — AMLODIPINE BESYLATE 10 MG: 5 TABLET ORAL at 10:45

## 2025-06-18 RX ADMIN — PANTOPRAZOLE SODIUM 40 MG: 40 TABLET, DELAYED RELEASE ORAL at 10:45

## 2025-06-18 RX ADMIN — HEPARIN SODIUM 5000 UNITS: 5000 INJECTION INTRAVENOUS; SUBCUTANEOUS at 21:43

## 2025-06-18 RX ADMIN — ONDANSETRON 4 MG: 4 TABLET, ORALLY DISINTEGRATING ORAL at 10:45

## 2025-06-18 RX ADMIN — TORSEMIDE 40 MG: 20 TABLET ORAL at 10:45

## 2025-06-18 RX ADMIN — HEPARIN SODIUM 5000 UNITS: 5000 INJECTION INTRAVENOUS; SUBCUTANEOUS at 14:27

## 2025-06-18 RX ADMIN — HEPARIN SODIUM 5000 UNITS: 5000 INJECTION INTRAVENOUS; SUBCUTANEOUS at 06:27

## 2025-06-18 RX ADMIN — OXYCODONE 5 MG: 5 TABLET ORAL at 21:43

## 2025-06-18 ASSESSMENT — PAIN DESCRIPTION - LOCATION
LOCATION: SHOULDER

## 2025-06-18 ASSESSMENT — PAIN SCALES - GENERAL
PAINLEVEL_OUTOF10: 3
PAINLEVEL_OUTOF10: 6
PAINLEVEL_OUTOF10: 1
PAINLEVEL_OUTOF10: 6

## 2025-06-18 ASSESSMENT — PAIN DESCRIPTION - ORIENTATION
ORIENTATION: RIGHT

## 2025-06-18 ASSESSMENT — PAIN DESCRIPTION - FREQUENCY: FREQUENCY: INTERMITTENT

## 2025-06-18 ASSESSMENT — PAIN DESCRIPTION - PAIN TYPE: TYPE: ACUTE PAIN

## 2025-06-18 ASSESSMENT — PAIN DESCRIPTION - ONSET: ONSET: GRADUAL

## 2025-06-18 ASSESSMENT — PAIN - FUNCTIONAL ASSESSMENT: PAIN_FUNCTIONAL_ASSESSMENT: PREVENTS OR INTERFERES SOME ACTIVE ACTIVITIES AND ADLS

## 2025-06-18 ASSESSMENT — PAIN DESCRIPTION - DESCRIPTORS
DESCRIPTORS: ACHING
DESCRIPTORS: ACHING
DESCRIPTORS: ACHING;SORE

## 2025-06-18 NOTE — ED PROVIDER NOTES
in place.  At least moderate size left pleural effusion appears increased in the interval..  Similar appearance of right basilar opacity and probable small effusion.  Mild vascular congestion.  No pneumothorax identified.  Right subclavian stent in place.  No acute osseous abnormality identified. Right shoulder: No fracture or dislocation identified.  Mild degenerative change involving the acromioclavicular joint.     1. No acute osseous abnormality identified in the right shoulder. 2. At least moderate size left pleural effusion appears increased in the interval. Similar appearance of right basilar opacity and probable small effusion. 3. Mild vascular congestion.     XR CHEST PORTABLE  Result Date: 6/18/2025  EXAMINATION: ONE XRAY VIEW OF THE CHEST; THREE XRAY VIEWS OF THE RIGHT SHOULDER 6/18/2025 2:18 am COMPARISON: 06/17/2025. HISTORY: ORDERING SYSTEM PROVIDED HISTORY: SOB TECHNOLOGIST PROVIDED HISTORY: Reason for exam:->SOB Reason for Exam: SOB; ORDERING SYSTEM PROVIDED HISTORY: fall from bed TECHNOLOGIST PROVIDED HISTORY: Reason for exam:->fall from bed Reason for Exam: fall from bed---R/shoulder pain FINDINGS: Chest: Cardiac silhouette enlargement.  Tunneled left internal jugular dialysis catheter in place.  At least moderate size left pleural effusion appears increased in the interval..  Similar appearance of right basilar opacity and probable small effusion.  Mild vascular congestion.  No pneumothorax identified.  Right subclavian stent in place.  No acute osseous abnormality identified. Right shoulder: No fracture or dislocation identified.  Mild degenerative change involving the acromioclavicular joint.     1. No acute osseous abnormality identified in the right shoulder. 2. At least moderate size left pleural effusion appears increased in the interval. Similar appearance of right basilar opacity and probable small effusion. 3. Mild vascular congestion.     CT CERVICAL SPINE WO CONTRAST  Result Date:  6/18/2025  EXAMINATION: CT OF THE CERVICAL SPINE WITHOUT CONTRAST 6/18/2025 2:01 am TECHNIQUE: CT of the cervical spine was performed without the administration of intravenous contrast. Multiplanar reformatted images are provided for review. Automated exposure control, iterative reconstruction, and/or weight based adjustment of the mA/kV was utilized to reduce the radiation dose to as low as reasonably achievable. COMPARISON: CT cervical spine 06/12/2025. HISTORY: ORDERING SYSTEM PROVIDED HISTORY: fall from bed TECHNOLOGIST PROVIDED HISTORY: Reason for exam:->fall from bed Decision Support Exception - unselect if not a suspected or confirmed emergency medical condition->Emergency Medical Condition (MA) Reason for Exam: fall from bed FINDINGS: BONES/ALIGNMENT: There is no acute fracture or traumatic malalignment. DEGENERATIVE CHANGES: No severe osseous spinal canal stenosis. SOFT TISSUES: Bilateral pleural effusions.     1. No acute abnormality of the cervical spine. 2. Bilateral pleural effusions.     CT HEAD WO CONTRAST  Result Date: 6/18/2025  EXAMINATION: CT OF THE HEAD WITHOUT CONTRAST  6/18/2025 2:01 am TECHNIQUE: CT of the head was performed without the administration of intravenous contrast. Automated exposure control, iterative reconstruction, and/or weight based adjustment of the mA/kV was utilized to reduce the radiation dose to as low as reasonably achievable. COMPARISON: CT brain 06/12/2025 HISTORY: ORDERING SYSTEM PROVIDED HISTORY: fall from bed TECHNOLOGIST PROVIDED HISTORY: If patient is on cardiac monitor and/or pulse ox, they may be taken off cardiac monitor and pulse ox, left on O2 if currently on. All monitors reattached when patient returns to room. Has a \"code stroke\" or \"stroke alert\" been called?->No Reason for exam:->fall from bed Decision Support Exception - unselect if not a suspected or confirmed emergency medical condition->Emergency Medical Condition (MA) Reason for Exam: fall from bed

## 2025-06-18 NOTE — RT PROTOCOL NOTE
RT Inhaler-Nebulizer Bronchodilator Protocol Note    There is a bronchodilator order in the chart from a provider indicating to follow the RT Bronchodilator Protocol and there is an “Initiate RT Inhaler-Nebulizer Bronchodilator Protocol” order as well (see protocol at bottom of note).    CXR Findings:  XR CHEST PORTABLE  Result Date: 6/18/2025  1. No acute osseous abnormality identified in the right shoulder. 2. At least moderate size left pleural effusion appears increased in the interval. Similar appearance of right basilar opacity and probable small effusion. 3. Mild vascular congestion.     XR CHEST PORTABLE  Result Date: 6/17/2025  Pulmonary vascular congestion with pulmonary edema       The findings from the last RT Protocol Assessment were as follows:   History Pulmonary Disease: Chronic pulmonary disease  Respiratory Pattern: Regular pattern and RR 12-20 bpm  Breath Sounds: Clear breath sounds  Cough: Strong, spontaneous, non-productive  Indication for Bronchodilator Therapy: On home bronchodilators  Bronchodilator Assessment Score: 2    Aerosolized bronchodilator medication orders have been revised according to the RT Inhaler-Nebulizer Bronchodilator Protocol below.    Respiratory Therapist to perform RT Therapy Protocol Assessment initially then follow the protocol.  Repeat RT Therapy Protocol Assessment PRN for score 0-3 or on second treatment, BID, and PRN for scores above 3.    No Indications - adjust the frequency to every 6 hours PRN wheezing or bronchospasm, if no treatments needed after 48 hours then discontinue using Per Protocol order mode.     If indication present, adjust the RT bronchodilator orders based on the Bronchodilator Assessment Score as indicated below.  Use Inhaler orders unless patient has one or more of the following: on home nebulizer, not able to hold breath for 10 seconds, is not alert and oriented, cannot activate and use MDI correctly, or respiratory rate 25 breaths per minute

## 2025-06-18 NOTE — ED NOTES
Patient Name: Kristine Ramirez  : 1975 50 y.o.  MRN: [id number]  ED Room #: ED-0006/06     Chief complaint:   Chief Complaint   Patient presents with    Fall     PATIENT ARRIVES FROM HOME VIA [facility] EMS DUE TO MECHANICAL FALL OF KELLY OUT OF BED; C/O RIGHT SHOULD PAIN; PATIENT TOOK 2MG OF ATIVAN ER PRIOR TO BED     Hospital Problem/Diagnosis:   Hospital Problems           Last Modified POA    * (Principal) Acute hypoxic respiratory failure (HCC) 2025 Yes         O2 Flow Rate:O2 Device: Nasal cannula O2 Flow Rate (L/min): 3 L/min (if applicable)  Cardiac Rhythm:   (if applicable)  Active LDA's:           How does patient ambulate? Unknown, did not assess in the Emergency Department    2. How does patient take pills? Unknown, no oral medications were given in the Emergency Department    3. Is patient alert? Drowsy, but responds to voice    4. Is patient oriented? To Person, To Place, To Time, To Situation, and Follows Commands    5.   Patient arrived from:  home  Facility Name: ___________________________________________    6. If patient is disoriented or from a Skill Nursing Facility has family been notified of admission? No    7. Patient belongings? Belongings: Cell Phone, Wallet, and Clothing    Disposition of belongings? Kept with Patient     8. Any specific patient or family belongings/needs/dynamics?   a.     9. Miscellaneous comments/pending orders?  a. HD- MWF     If there are any additional questions please reach out to the Emergency Department.

## 2025-06-18 NOTE — PROGRESS NOTES
off cardiac monitor and pulse ox, left on O2 if currently on. All monitors reattached when patient returns to room. Has a \"code stroke\" or \"stroke alert\" been called?->No Reason for exam:->fall from bed Decision Support Exception - unselect if not a suspected or confirmed emergency medical condition->Emergency Medical Condition (MA) Reason for Exam: fall from bed FINDINGS: BRAIN/VENTRICLES: There is no acute intracranial hemorrhage, mass effect or midline shift.  No abnormal extra-axial fluid collection.  The gray-white differentiation is maintained without evidence of an acute infarct.  There is no evidence of hydrocephalus. Mild generalized parenchymal volume loss and chronic periventricular white matter hypoattenuation are seen. ORBITS: The visualized portion of the orbits demonstrate no acute abnormality. SINUSES: The visualized paranasal sinuses and mastoid air cells demonstrate no acute abnormality. SOFT TISSUES/SKULL:  No acute abnormality of the visualized skull or soft tissues.     No acute intracranial abnormality.     XR CHEST PORTABLE  Result Date: 6/17/2025  EXAMINATION: ONE XRAY VIEW OF THE CHEST 6/17/2025 11:07 am COMPARISON: 06/12/2025 HISTORY: ORDERING SYSTEM PROVIDED HISTORY: hypoxia TECHNOLOGIST PROVIDED HISTORY: Reason for exam:->hypoxia Reason for Exam: hypoxia FINDINGS: Left-sided central venous catheter terminates in the right atrium. Cardiomegaly.  Pulmonary vascular congestion.  No focal pulmonary consolidation.  Pulmonary edema.     Pulmonary vascular congestion with pulmonary edema     Vascular duplex hemodialysis access right  Result Date: 6/15/2025    AV fistula of the right upper extremity. Stenosis noted at the proximal anastomosis, proximal segment and distal anastomosis/confluence.     IR TUNNELED CVC PLACE WO SQ PORT/PUMP > 5 YEARS  Result Date: 6/13/2025  PROCEDURE: ULTRASOUND GUIDED VASCULAR ACCESS FLUOROSCOPY GUIDED PLACEMENT OF A TUNNELED CATHETER MODERATE CONSCIOUS SEDATION  6/13/2025 HISTORY: ORDERING SYSTEM PROVIDED HISTORY: TDC for HD TECHNOLOGIST PROVIDED HISTORY: Reason for exam:->TDC for HD How many lumens are being requested?->2 What side should this line be placed?->Right What site is the preferred site?->Internal Jugular SEDATION: An immediate re-assessment was completed prior to sedation, and it was determined to be safe to proceed. A total of 1 mg versed and 50 mcg fentanyl were titrated intravenously for moderate sedation monitored under my direction. Total intra-service time of sedation was 21 minutes. The patient's vital signs were monitored throughout the procedure and recorded in the patient's medical record by the nurse. Sedation monitoring started at 15:23 and ended at 15:44.  Additionally, the patient received 2 g IV Ancef for prophylaxis. PHYSICIANS: Zia Rivas M.D. FLUOROSCOPY DOSE AND TYPE OR TIME AND EXPOSURES: 0.7 minutes fluoroscopy time 8.14 mGy cumulative air Kerma 220 acquired images in total TECHNIQUE: Informed consent was obtained after a detailed explanation of the procedure including risks, benefits, and alternatives. All aspects of maximum sterile barrier technique were used including washing hands with conventional soap and water or with alcohol-based hand rubs (ABHR), skin preparation, cap, mask, sterile gown, sterile gloves, and sterile full body drape. Local anesthesia was achieved with lidocaine. A micropuncture needle was used to access the left internal jugular vein using ultrasound guidance. An ultrasound image demonstrating patency of the vein with needle tip located within it was obtained and stored in PACS.  A 0.035 guidewire was used to place a peel-away sheath. A subcutaneous tunnel was created to the infraclavicular region and a tunneled 23 cm tip to cuff HD catheter was pulled through the subcutaneous tunnel to the venotomy site and advanced through the peel-away sheath under fluoroscopic guidance to the cavoatrial junction. The

## 2025-06-18 NOTE — TELEPHONE ENCOUNTER
Care Transitions Initial Follow Up Call    Outreach made within 2 business days of discharge: Yes    Patient: Kristine Ramirez Patient : 1975   MRN: [id number]  Reason for Admission: Fall  Discharge Date: 25       Spoke with: Patient    Discharge department/facility: [facility]    Patient was readmitted    Scheduled appointment with PCP within 7-14 days    Follow Up  Future Appointments   Date Time Provider Department Center   7/3/2025 10:00 AM Damon Mireles MD [facility]   2025  9:45 AM Radha Brennan MD [facility]       Alvina Hui LPN

## 2025-06-18 NOTE — ED NOTES
.Patient oriented to room and ED throughput process.  Safety measures with ED bed locked in lowest position and call light in reach.  Patient educated on all orders, including any medications.  Patient educated on chief complaint/symptoms. Patient encouraged to ask questions regarding care, medications or treatment plan.  Patient aware of how to reach staff with questions/concerns.

## 2025-06-18 NOTE — H&P
V2.0  History and Physical      Name:  Kristine Ramirez /Age/Sex: 1975  (50 y.o. female)   MRN & CSN:  [id number] & [id number] Encounter Date/Time: 2025 6:11 AM EDT   Location:  [facility] PCP: Damon Mireles MD       Hospital Day: 1    Assessment and Plan:   Kristine Ramirez is a 50 y.o. female with a pmh of stroke with residual right side deficit, essential hypertension end-stage renal disease on dialysis  who presents with Acute hypoxic respiratory failure (HCC)    Hospital Problems           Last Modified POA    * (Principal) Acute hypoxic respiratory failure (HCC) 2025 Yes       Plan:  #Acute hypoxic respiratory failure due to hypovolemia.  #Acute pulmonary  #End-stage renal disease on dialysis    Patient was discharged today from hospital, she was admitted for similar issue she has underlying end-stage renal disease on dialysis.  Baseline no oxygen  - At the time of arrival O2 saturation 89% on room air she was placed on 2 L  - Patient had hemodialysis in the hospital  - Chest x-ray: At least moderate size left pleural effusion appears increased in the interval. Similar appearance of right basilar opacity and probable small  effusion.  - Continue home torsemide  - Nephrology consulted    #Acute encephalopathy likely secondary to drug alprazolam and Lyrica  - Will currently monitor and hold dose medication    #CVA with right-sided residual weakness  #Frequent fall  - CT of head without contrast no acute intracranial abnormality  - CT cervical spine without contrast no acute abnormality of cervical spine  - X-ray of right shoulder no acute osseous abnormality identified in the right shoulder.    #Essential hypertension continue clonidine and Norvasc's    DVT Prophylaxis: Lovenox  Diet: Regular diet    Advance care planning:  Advance care planning the patient for total of 16 minutes.  Full code, DNR CC, DNR CCA, power of  and  patient's medical record by the nurse. Sedation monitoring started at 15:23 and ended at 15:44.  Additionally, the patient received 2 g IV Ancef for prophylaxis. PHYSICIANS: Zia Rivas M.D. FLUOROSCOPY DOSE AND TYPE OR TIME AND EXPOSURES: 0.7 minutes fluoroscopy time 8.14 mGy cumulative air Kerma 220 acquired images in total TECHNIQUE: Informed consent was obtained after a detailed explanation of the procedure including risks, benefits, and alternatives. All aspects of maximum sterile barrier technique were used including washing hands with conventional soap and water or with alcohol-based hand rubs (ABHR), skin preparation, cap, mask, sterile gown, sterile gloves, and sterile full body drape. Local anesthesia was achieved with lidocaine. A micropuncture needle was used to access the left internal jugular vein using ultrasound guidance. An ultrasound image demonstrating patency of the vein with needle tip located within it was obtained and stored in PACS.  A 0.035 guidewire was used to place a peel-away sheath. A subcutaneous tunnel was created to the infraclavicular region and a tunneled 23 cm tip to cuff HD catheter was pulled through the subcutaneous tunnel to the venotomy site and advanced through the peel-away sheath under fluoroscopic guidance to the cavoatrial junction. The catheter flushed easily and there was a good blood return.  The catheter was sutured to the skin. The catheter was locked with the appropriate volume of heparinized saline. The patient tolerated the procedure well and there were no immediate complications. Estimated blood loss: Minimal (less than 5 mL) FINDINGS: Fluoroscopic image demonstrates the tip of the catheter in the cavoatrial junction.     Successful ultrasound and fluoroscopy guided tunneled catheter placement via the left internal jugular vein. RECOMMENDATIONS: Dialysis catheter ready for immediate use.     CT CERVICAL SPINE WO CONTRAST  Result Date: 6/12/2025  EXAMINATION:

## 2025-06-18 NOTE — TELEPHONE ENCOUNTER
Care Transitions Initial Follow Up Call    Outreach made within 2 business days of discharge: Yes    Patient: Kristine Ramirez Patient : 1975   MRN: [id number]  Reason for Admission: hypervolemia  Discharge Date: 25       Spoke with: voicemail not set up    Discharge department/facility: [facility]        Follow Up  Future Appointments   Date Time Provider Department Center   7/3/2025 10:00 AM Damon Mireles MD [facility] DEP   2025  9:45 AM Radha Brennan MD [facility]       Alvina Hui LPN

## 2025-06-18 NOTE — ED PROVIDER NOTES
EMERGENCY DEPARTMENT PROVIDER NOTE         PATIENT IDENTIFICATION     Name:   Kristine Ramirez  MRN:   [id number]  YOB: 1975  Date of Evaluation:   2025  Provider:   Marcelina Motta NP; Ezio Mir DO  PCP:   Damon Mirlees MD        CHIEF COMPLAINT     Fall (PATIENT ARRIVES FROM HOME VIA [facility] EMS DUE TO MECHANICAL FALL OF KELLY OUT OF BED; C/O RIGHT SHOULD PAIN; PATIENT TOOK 2MG OF ATIVAN ER PRIOR TO BED)        HISTORY OF PRESENT ILLNESS     I independently interviewed patient and/or caretaker(s).  See Advanced Practice Provider (KANDY) note for full HPI.  In summary, Kristine Ramirez  is a(n) 50 y.o. female who presents with right shoulder pain after mechanical fall out of bed.  She affirms being somnolent after she took 2 mg oral Xanax which she was prescribed for anxiety.        PHYSICAL EXAM     I reviewed physical exam performed and documented by KANDY.  I performed an independent physical examination with findings as follows:  Somnolent but arousable female with reproducible tenderness palpation of the right shoulder.  She has Rales in the bilateral right greater than left lung fields.        EKG INTERPRETATION     TIME:   0403  RATE:   99 bpm  CA INTERVAL:   182 ms  QRS DURATION:   86 ms  QT/QTc:   350/449 ms  RHYTHM:   Normal sinus  AXIS:   Regular  ABNORMALITIES  No STEMI  T wave inversions in lead(s) aVL    PRIOR EK25- current EKG without significant changes when compared to prior    INTERPRETATION:  Nonspecific, unchanged from prior    REVIEWED BY:   Ezio Mir Jr., DO    EKG was independently reviewed by emergency department physician in absence of cardiologist.        LAB RESULTS     Results for orders placed or performed during the hospital encounter of 25   Blood gas, venous   Result Value Ref Range    pH, Dayne 7.348 (L) 7.350 - 7.450    pCO2, Dayne 46.6 40.0 - 50.0 mmHg    PO2, Dayne 104.0 (H) 25.0 - 40.0 mmHg    HCO3, Venous

## 2025-06-18 NOTE — PROGRESS NOTES
Treatment time: 3 hrs    Net UF:  3000 ml     Pre weight: 71 kg  Post weight: 68 kg     Access used: Ltdc  Access function:  tolerated well,   ml/min     Medications or blood products given: heparin dwells     Regular outpatient schedule: MWF     Summary of response to treatment: Pt tolerated well. Pt remained stable throughout entire treatment and upon exiting the hemodialysis suite.      Copy of dialysis treatment record placed in chart, to be scanned into EMR.

## 2025-06-18 NOTE — FLOWSHEET NOTE
06/18/25 0930   Vitals   Temp 96.9 °F (36.1 °C)   Temp Source Temporal   Pulse 96   Heart Rate Source Monitor   Respirations 18   BP (!) 164/79   MAP (Calculated) 107   MAP (mmHg) 105   BP Location Left upper arm   BP Upper/Lower Upper   BP Method Automatic   Patient Position Semi fowlers   Cardiac Rhythm Sinus rhythm   Pain Assessment   Pain Assessment 0-10   Pain Level 6   Pain Location Shoulder   Pain Orientation Right   Pain Descriptors Aching   Oxygen Therapy   SpO2 95 %   Pulse Oximeter Device Mode Intermittent   Pulse Oximeter Device Location Finger   O2 Device Nasal cannula   O2 Flow Rate (L/min) 2 L/min     Pt arrived to CVU 2912. VSS. Pt alert and oriented x4. Admission assessment complete- see complex data in flowsheet.  POC discussed with pt, no further questions or concerns at this time. Bed in lowest position, call light within reach, bed alarm on. Will continue to monitor.   Electronically signed by Anat Miller RN on 6/18/2025 at 9:40 AM

## 2025-06-18 NOTE — CONSULTS
[facility]  Consultation Note    Reason for Consult: ESRD management  Requesting Physician: Dr. UTE Raza    CHIEF COMPLAINT: Fall    History obtained from records and patient.    HISTORY OF PRESENT ILLNESS:                   Kristine Ramirez is a 50 y.o., female with significant past medical history of ESRD, on HD MWF at [facility] under our care, CVA, CHF, pericardial effusion, diabetes mellitus type 2, depression, hypertension, migraine headaches, hyperlipidemia who presents with fall.  Was just discharged yesterday.  During her last admission, TDC was placed since AV fistula was not working.  Vascular surgery consulted.  Potassium today 4.6 and serum bicarbonate of 22.  Systolic blood pressure ranging from 140s to 160s range and saturating 95% on 2L nasal cannula.    Past Medical History:     has a past medical history of Acute respiratory failure due to COVID-19 ([facility]), Arterial ischemic stroke, ICA, left, acute ([facility]), Blind in both eyes, Cerebral artery occlusion with cerebral infarction ([facility]), CHF (congestive heart failure) ([facility]), Chronic kidney disease, COPD (chronic obstructive pulmonary disease) ([facility]), Depression, Diabetes mellitus out of control, Diabetes mellitus, type II ([facility]), Diabetic neuropathy associated with type 2 diabetes mellitus ([facility]), Generalized headaches, Hypertension, Infertility, Insomnia, Migraine headache, Mixed hyperlipidemia, Otitis media, Pelvic abscess in female, Pneumonia, Stroke ([facility]), and Stroke ([facility]).   Past Surgical History:     has a past surgical history that includes Cervix surgery; eye surgery; Foot surgery (Right); Foot surgery (Bilateral); Foot surgery (Left); IR TUNNELED CVC PLACE WO SQ PORT/PUMP > 5 YEARS (9/7/2021); Dialysis fistula creation (Right, 2/10/2022); Upper gastrointestinal endoscopy (N/A, 9/5/2024); Upper gastrointestinal endoscopy (N/A, 12/5/2024); Cardiac procedure (N/A, 3/18/2025); and IR TUNNELED CVC PLACE WO

## 2025-06-19 LAB
ALBUMIN SERPL-MCNC: 3.5 G/DL (ref 3.4–5)
ALBUMIN/GLOB SERPL: 1.1 {RATIO} (ref 1.1–2.2)
ALP SERPL-CCNC: 144 U/L (ref 40–129)
ALT SERPL-CCNC: <5 U/L (ref 10–40)
ANION GAP SERPL CALCULATED.3IONS-SCNC: 14 MMOL/L (ref 3–16)
AST SERPL-CCNC: 15 U/L (ref 15–37)
BASOPHILS # BLD: 0 K/UL (ref 0–0.2)
BASOPHILS NFR BLD: 0.9 %
BILIRUB SERPL-MCNC: 0.6 MG/DL (ref 0–1)
BUN SERPL-MCNC: 20 MG/DL (ref 7–20)
CALCIUM SERPL-MCNC: 9 MG/DL (ref 8.3–10.6)
CHLORIDE SERPL-SCNC: 101 MMOL/L (ref 99–110)
CO2 SERPL-SCNC: 23 MMOL/L (ref 21–32)
CREAT SERPL-MCNC: 3.4 MG/DL (ref 0.6–1.1)
DEPRECATED RDW RBC AUTO: 16.4 % (ref 12.4–15.4)
EOSINOPHIL # BLD: 0.1 K/UL (ref 0–0.6)
EOSINOPHIL NFR BLD: 3.7 %
GFR SERPLBLD CREATININE-BSD FMLA CKD-EPI: 16 ML/MIN/{1.73_M2}
GLUCOSE SERPL-MCNC: 132 MG/DL (ref 70–99)
HCT VFR BLD AUTO: 33.7 % (ref 36–48)
HGB BLD-MCNC: 11.1 G/DL (ref 12–16)
LYMPHOCYTES # BLD: 0.6 K/UL (ref 1–5.1)
LYMPHOCYTES NFR BLD: 14 %
MCH RBC QN AUTO: 29.8 PG (ref 26–34)
MCHC RBC AUTO-ENTMCNC: 32.9 G/DL (ref 31–36)
MCV RBC AUTO: 90.6 FL (ref 80–100)
MONOCYTES # BLD: 0.3 K/UL (ref 0–1.3)
MONOCYTES NFR BLD: 8 %
NEUTROPHILS # BLD: 3 K/UL (ref 1.7–7.7)
NEUTROPHILS NFR BLD: 73.4 %
PHOSPHATE SERPL-MCNC: 4.3 MG/DL (ref 2.5–4.9)
PLATELET # BLD AUTO: 124 K/UL (ref 135–450)
PMV BLD AUTO: 8.1 FL (ref 5–10.5)
POTASSIUM SERPL-SCNC: 4.1 MMOL/L (ref 3.5–5.1)
PROT SERPL-MCNC: 6.6 G/DL (ref 6.4–8.2)
RBC # BLD AUTO: 3.72 M/UL (ref 4–5.2)
SODIUM SERPL-SCNC: 138 MMOL/L (ref 136–145)
WBC # BLD AUTO: 4 K/UL (ref 4–11)

## 2025-06-19 PROCEDURE — 96372 THER/PROPH/DIAG INJ SC/IM: CPT

## 2025-06-19 PROCEDURE — 6360000002 HC RX W HCPCS

## 2025-06-19 PROCEDURE — 94760 N-INVAS EAR/PLS OXIMETRY 1: CPT

## 2025-06-19 PROCEDURE — 97530 THERAPEUTIC ACTIVITIES: CPT

## 2025-06-19 PROCEDURE — G0378 HOSPITAL OBSERVATION PER HR: HCPCS

## 2025-06-19 PROCEDURE — 6360000002 HC RX W HCPCS: Performed by: HOSPITALIST

## 2025-06-19 PROCEDURE — 96374 THER/PROPH/DIAG INJ IV PUSH: CPT

## 2025-06-19 PROCEDURE — 96375 TX/PRO/DX INJ NEW DRUG ADDON: CPT

## 2025-06-19 PROCEDURE — 97166 OT EVAL MOD COMPLEX 45 MIN: CPT

## 2025-06-19 PROCEDURE — 2700000000 HC OXYGEN THERAPY PER DAY

## 2025-06-19 PROCEDURE — 2500000003 HC RX 250 WO HCPCS

## 2025-06-19 PROCEDURE — 84100 ASSAY OF PHOSPHORUS: CPT

## 2025-06-19 PROCEDURE — 6370000000 HC RX 637 (ALT 250 FOR IP)

## 2025-06-19 PROCEDURE — 80053 COMPREHEN METABOLIC PANEL: CPT

## 2025-06-19 PROCEDURE — 6370000000 HC RX 637 (ALT 250 FOR IP): Performed by: STUDENT IN AN ORGANIZED HEALTH CARE EDUCATION/TRAINING PROGRAM

## 2025-06-19 PROCEDURE — 96376 TX/PRO/DX INJ SAME DRUG ADON: CPT

## 2025-06-19 PROCEDURE — 97535 SELF CARE MNGMENT TRAINING: CPT

## 2025-06-19 PROCEDURE — 6370000000 HC RX 637 (ALT 250 FOR IP): Performed by: HOSPITALIST

## 2025-06-19 PROCEDURE — 85025 COMPLETE CBC W/AUTO DIFF WBC: CPT

## 2025-06-19 PROCEDURE — 97161 PT EVAL LOW COMPLEX 20 MIN: CPT

## 2025-06-19 RX ORDER — CLONIDINE HYDROCHLORIDE 0.1 MG/1
0.2 TABLET ORAL 3 TIMES DAILY
Status: DISCONTINUED | OUTPATIENT
Start: 2025-06-19 | End: 2025-06-21 | Stop reason: HOSPADM

## 2025-06-19 RX ORDER — PROCHLORPERAZINE EDISYLATE 5 MG/ML
5 INJECTION INTRAMUSCULAR; INTRAVENOUS EVERY 6 HOURS PRN
Status: DISCONTINUED | OUTPATIENT
Start: 2025-06-19 | End: 2025-06-21 | Stop reason: HOSPADM

## 2025-06-19 RX ADMIN — HEPARIN SODIUM 5000 UNITS: 5000 INJECTION INTRAVENOUS; SUBCUTANEOUS at 22:00

## 2025-06-19 RX ADMIN — CLONIDINE HYDROCHLORIDE 0.2 MG: 0.1 TABLET ORAL at 14:17

## 2025-06-19 RX ADMIN — HEPARIN SODIUM 5000 UNITS: 5000 INJECTION INTRAVENOUS; SUBCUTANEOUS at 14:18

## 2025-06-19 RX ADMIN — ACETAMINOPHEN 650 MG: 325 TABLET ORAL at 05:58

## 2025-06-19 RX ADMIN — ONDANSETRON 4 MG: 2 INJECTION INTRAMUSCULAR; INTRAVENOUS at 09:09

## 2025-06-19 RX ADMIN — OXYCODONE 5 MG: 5 TABLET ORAL at 05:59

## 2025-06-19 RX ADMIN — ASPIRIN 81 MG: 81 TABLET, DELAYED RELEASE ORAL at 09:09

## 2025-06-19 RX ADMIN — PROCHLORPERAZINE EDISYLATE 5 MG: 5 INJECTION INTRAMUSCULAR; INTRAVENOUS at 11:56

## 2025-06-19 RX ADMIN — HEPARIN SODIUM 5000 UNITS: 5000 INJECTION INTRAVENOUS; SUBCUTANEOUS at 05:59

## 2025-06-19 RX ADMIN — AMLODIPINE BESYLATE 10 MG: 5 TABLET ORAL at 09:09

## 2025-06-19 RX ADMIN — OXYCODONE 5 MG: 5 TABLET ORAL at 14:17

## 2025-06-19 RX ADMIN — SODIUM CHLORIDE, PRESERVATIVE FREE 5 ML: 5 INJECTION INTRAVENOUS at 20:33

## 2025-06-19 RX ADMIN — SODIUM CHLORIDE, PRESERVATIVE FREE 10 ML: 5 INJECTION INTRAVENOUS at 09:12

## 2025-06-19 RX ADMIN — PANTOPRAZOLE SODIUM 40 MG: 40 TABLET, DELAYED RELEASE ORAL at 06:00

## 2025-06-19 RX ADMIN — TORSEMIDE 40 MG: 20 TABLET ORAL at 09:09

## 2025-06-19 RX ADMIN — ACETAMINOPHEN 650 MG: 325 TABLET ORAL at 14:16

## 2025-06-19 RX ADMIN — PANTOPRAZOLE SODIUM 40 MG: 40 TABLET, DELAYED RELEASE ORAL at 17:43

## 2025-06-19 RX ADMIN — CLONIDINE HYDROCHLORIDE 0.1 MG: 0.1 TABLET ORAL at 02:21

## 2025-06-19 RX ADMIN — CLONIDINE HYDROCHLORIDE 0.2 MG: 0.1 TABLET ORAL at 20:33

## 2025-06-19 RX ADMIN — ONDANSETRON 4 MG: 2 INJECTION INTRAMUSCULAR; INTRAVENOUS at 03:25

## 2025-06-19 ASSESSMENT — ENCOUNTER SYMPTOMS
BLOOD IN STOOL: 0
COUGH: 0
ABDOMINAL PAIN: 0
FACIAL SWELLING: 0
SHORTNESS OF BREATH: 0
CONSTIPATION: 0
PHOTOPHOBIA: 0
CHEST TIGHTNESS: 0
NAUSEA: 0
DIARRHEA: 0
ABDOMINAL DISTENTION: 0
EYE REDNESS: 0
EYE DISCHARGE: 0
VOMITING: 0

## 2025-06-19 ASSESSMENT — PAIN SCALES - GENERAL
PAINLEVEL_OUTOF10: 6
PAINLEVEL_OUTOF10: 2
PAINLEVEL_OUTOF10: 2
PAINLEVEL_OUTOF10: 6
PAINLEVEL_OUTOF10: 0
PAINLEVEL_OUTOF10: 6

## 2025-06-19 ASSESSMENT — PAIN DESCRIPTION - ORIENTATION
ORIENTATION: RIGHT

## 2025-06-19 ASSESSMENT — PAIN DESCRIPTION - PAIN TYPE
TYPE: ACUTE PAIN
TYPE: ACUTE PAIN

## 2025-06-19 ASSESSMENT — PAIN DESCRIPTION - LOCATION
LOCATION: SHOULDER

## 2025-06-19 ASSESSMENT — PAIN DESCRIPTION - FREQUENCY
FREQUENCY: CONTINUOUS
FREQUENCY: CONTINUOUS

## 2025-06-19 ASSESSMENT — PAIN DESCRIPTION - DESCRIPTORS
DESCRIPTORS: ACHING
DESCRIPTORS: ACHING;DISCOMFORT;SORE
DESCRIPTORS: ACHING

## 2025-06-19 ASSESSMENT — PAIN DESCRIPTION - ONSET
ONSET: ON-GOING
ONSET: ON-GOING

## 2025-06-19 NOTE — PROGRESS NOTES
[facility] - Inpatient Rehabilitation Department   Phone: [phone]    Occupational Therapy    [x] Initial Evaluation            [] Daily Treatment Note         [] Discharge Summary      Patient: Kristine Ramirez   : 1975   MRN: [id number]   Date of Service:  2025    Admitting Diagnosis:  Acute hypoxic respiratory failure (HCC)  Current Admission Summary:  Kristine Ramirez is a 50 y.o. female with pmh of stroke with residual right side deficit, essential hypertension end-stage renal disease on dialysis  who was recently admitted for vol overload with pul edema and was discharged on 25 presented with hx of fall and right shoulder pain at home. IN ER, found to have O2 sat 89% on RA.   Past Medical History:  has a past medical history of Acute respiratory failure due to COVID-19 (HCC), Arterial ischemic stroke, ICA, left, acute (HCC), Blind in both eyes, Cerebral artery occlusion with cerebral infarction (HCC), CHF (congestive heart failure) (HCC), Chronic kidney disease, COPD (chronic obstructive pulmonary disease) (HCC), Depression, Diabetes mellitus out of control, Diabetes mellitus, type II (HCC), Diabetic neuropathy associated with type 2 diabetes mellitus (HCC), Generalized headaches, Hypertension, Infertility, Insomnia, Migraine headache, Mixed hyperlipidemia, Otitis media, Pelvic abscess in female, Pneumonia, Stroke (HCC), and Stroke (HCC).  Past Surgical History:  has a past surgical history that includes Cervix surgery; eye surgery; Foot surgery (Right); Foot surgery (Bilateral); Foot surgery (Left); IR TUNNELED CVC PLACE WO SQ PORT/PUMP > 5 YEARS (2021); Dialysis fistula creation (Right, 2/10/2022); Upper gastrointestinal endoscopy (N/A, 2024); Upper gastrointestinal endoscopy (N/A, 2024); Cardiac procedure (N/A, 3/18/2025); and IR TUNNELED CVC PLACE WO SQ PORT/PUMP > 5 YEARS (2025).    Discharge Recommendations: Kristine Barba  James scored a 14/24 on the AM-PAC ADL Inpatient form. Current research shows that an AM-PAC score of 17 or less is typically not associated with a discharge to the patient's home setting. Based on the patient's AM-PAC score and their current ADL deficits, it is recommended that the patient have 3-5 sessions per week of Occupational Therapy at d/c to increase the patient's independence.  Please see assessment section for further patient specific details.    If patient discharges prior to next session this note will serve as a discharge summary.  Please see below for the latest assessment towards goals.      DME Required For Discharge: DME to be determined at next level of care, DME to be determined pending patient progress    Precautions/Restrictions: high fall risk  Weight Bearing Restrictions: no restrictions  [] Right Upper Extremity  [] Left Upper Extremity [] Right Lower Extremity  [] Left Lower Extremity     Required Braces/Orthotics: no braces required   [] Right  [] Left  Positional Restrictions:no positional restrictions    Pre-Admission Information   Lives With: spouse                  Type of Home: handicap accessible condo  Home Layout: one level  Home Access: level entry  Bathroom Layout: walk in shower  Bathroom Equipment: grab bars in shower, grab bars around toilet, shower chair, hand held shower head  Toilet Height: standard height  Home Equipment: rolling walker, manual wheelchair  Transfer Assistance: Independent without use of device but stated has had a decline over the last month  Ambulation Assistance:Independent without use of device, does not use device inside the house, uses wheelchair if going to dialysis or RW if going to store   ADL Assistance: requires assistance with bathing, patient performs own dressing   IADL Assistance: requires assistance with laundry, requires assistance with cleaning  Active :        [] Yes                 [x] No  Hand Dominance: [x] Left

## 2025-06-19 NOTE — CARE COORDINATION
06/19/25 0944   IMM Letter   Observation Status Letter date given: 06/19/25   Observation Status Letter time given: 0943   Observation Status Letter given to Patient/Family/Significant other/Guardian/POA/by: Downgrade OBS Notice Given

## 2025-06-19 NOTE — PROGRESS NOTES
[facility] - Inpatient Rehabilitation Department   Phone: [phone]    Physical Therapy    [x] Initial Evaluation            [] Daily Treatment Note         [] Discharge Summary      Patient: Kristine Ramirez   : 1975   MRN: [id number]   Date of Service:  2025  Admitting Diagnosis: Acute hypoxic respiratory failure (HCC)  Current Admission Summary: Kristine Ramirez is a 50 y.o. female with pmh of stroke with residual right side deficit, essential hypertension end-stage renal disease on dialysis  who was recently admitted for vol overload with pul edema and was discharged on 25 presented with hx of fall and right shoulder pain at home. IN ER, found to have O2 sat 89% on RA.   Past Medical History:  has a past medical history of Acute respiratory failure due to COVID-19 (HCC), Arterial ischemic stroke, ICA, left, acute (HCC), Blind in both eyes, Cerebral artery occlusion with cerebral infarction (HCC), CHF (congestive heart failure) (HCC), Chronic kidney disease, COPD (chronic obstructive pulmonary disease) (HCC), Depression, Diabetes mellitus out of control, Diabetes mellitus, type II (HCC), Diabetic neuropathy associated with type 2 diabetes mellitus (HCC), Generalized headaches, Hypertension, Infertility, Insomnia, Migraine headache, Mixed hyperlipidemia, Otitis media, Pelvic abscess in female, Pneumonia, Stroke (HCC), and Stroke (HCC).  Past Surgical History:  has a past surgical history that includes Cervix surgery; eye surgery; Foot surgery (Right); Foot surgery (Bilateral); Foot surgery (Left); IR TUNNELED CVC PLACE WO SQ PORT/PUMP > 5 YEARS (2021); Dialysis fistula creation (Right, 2/10/2022); Upper gastrointestinal endoscopy (N/A, 2024); Upper gastrointestinal endoscopy (N/A, 2024); Cardiac procedure (N/A, 3/18/2025); and IR TUNNELED CVC PLACE WO SQ PORT/PUMP > 5 YEARS (2025).    Discharge Recommendations: Kristine Barba

## 2025-06-19 NOTE — PROGRESS NOTES
[facility]    Hospitalist Progress Note:      Name:  Kristine Ramirez /Age/Sex: 1975  (50 y.o. female)   MRN & CSN:  [id number] & [id number] Encounter Date: 2025    Location:  CVU2912/2912-01 PCP: Damon Mireles MD     Attending:Graham Raza MD  Admission Date: 2025      Hospital Day: 2    Chief Complain/Reason for Admission:  Chief Complaint   Patient presents with    Fall     PATIENT ARRIVES FROM HOME VIA [facility] EMS DUE TO MECHANICAL FALL OF KELLY OUT OF BED; C/O RIGHT SHOULD PAIN; PATIENT TOOK 2MG OF AT[facility]AN ER PRIOR TO BED     Assessment:  Kristine Ramirez is a 50 y.o. female with pmh of stroke with residual right side deficit, essential hypertension end-stage renal disease on dialysis  who was recently admitted for vol overload with pul edema and was discharged on 25 presented with hx of fall and right shoulder pain at home. IN ER, found to have O2 sat 89% on RA.   AMS/Lethargy likely d/t sedative meds including alprazolam, lyrica and buspar  S/p Fall at home d/t #1  R shoulder pain likely d/t contusion: no acute fx on XR  Hypoxia, mild: 89% on RA, on 2 L currently  Recent admission for Volume overload - improved with HD  Known right central vein occlusion  Right brachiocephalic AVF (2022)  ESRD on HD - MWFDM  HTN, accelarated  HLD  H/o CVA with residual right sided weakness    Plan:   Resume home clonidine, monitor BP closely  Cont HD per Nephrology team  Fluid and lytes monitoring.  Cont to hold alprazolam and lyrica. Cont burspar for reported hx of anxiety.  Cont 2 L oxygen, wean as able  Supportive care  Vascular surgery f/u as outpt  PT/OT for ECF  Current meds reviewed, adjusted.   See orders  Diet ADULT DIET; Regular; Low Sodium (2 gm); Low Potassium (Less than 3000 mg/day); Low Phosphorus (Less than 1000 mg); 1000 ml   DVT Prophylaxis [] Lovenox, [x]  Heparin, [] SCDs, [] Ambulation,  [] Eliquis, []

## 2025-06-19 NOTE — PROGRESS NOTES
Physical Therapy/Occupational Therapy  Kristine Ramirez  PT evaluation attempted this am; discussed with RN, Rosa Maria, and will hold due to nausea and elevated BP; will follow up later today.  Thanks, Glenna Quiroga, [id number]  Zia Sweeney, MOT, OTR/L, CNS ([id number])

## 2025-06-19 NOTE — PROGRESS NOTES
[facility] Note    Patient Active Problem List   Diagnosis    Type 2 diabetes mellitus with hyperlipidemia (HCC)    Mixed hyperlipidemia    Migraine headache    Anemia    Diabetic foot infection (HCC)    Pyogenic inflammation of bone (HCC)    History of medication noncompliance    Osteomyelitis of left foot (HCC)    Nephrotic syndrome    Peripheral edema    Pulmonary edema    Right sided numbness    Tobacco dependence    H/O: CVA (cerebrovascular accident)    HTN (hypertension), benign    DM (diabetes mellitus), secondary, uncontrolled, 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, associated with type 2 diabetes mellitus (HCC)    Cellulitis of right foot    Hidradenitis suppurativa    Hypocalcemia    Non-toxic multinodular goiter    Polyneuropathy due to type 2 diabetes mellitus (HCC)    Proliferative diabetic retinopathy associated with type 2 diabetes mellitus (HCC)    Epiglottitis    Recurrent falls    Hypotension    Leukocytosis    Volume overload    Hemodialysis catheter dysfunction    Hypoproteinemia    GABRIELA (obstructive sleep apnea)    Type 2 diabetes mellitus with obesity (HCC)    Wheelchair dependence    ESRD on dialysis (HCC)    Hyperkalemia    Suspected COVID-19 virus infection    Dependent on walker for ambulation    Medication management contract agreement - signed on 5/18/2023    Controlled drug dependence (HCC)    Generalized anxiety disorder with panic attacks    Coagulation defect    Chronic obstructive pulmonary disease, unspecified (HCC)    Abdominal distention    Encounter for assessment of ascites    Other ascites    Physical deconditioning    AMS (altered mental status)    Primary hypertension    Cardiomyopathy, unspecified type (HCC)    Pneumonia, bacterial

## 2025-06-20 PROBLEM — I10 ACCELERATED HYPERTENSION: Status: ACTIVE | Noted: 2025-06-20

## 2025-06-20 LAB
ALBUMIN SERPL-MCNC: 3.4 G/DL (ref 3.4–5)
ALBUMIN/GLOB SERPL: 1.1 {RATIO} (ref 1.1–2.2)
ALP SERPL-CCNC: 139 U/L (ref 40–129)
ALT SERPL-CCNC: <5 U/L (ref 10–40)
ANION GAP SERPL CALCULATED.3IONS-SCNC: 15 MMOL/L (ref 3–16)
AST SERPL-CCNC: 12 U/L (ref 15–37)
BASOPHILS # BLD: 0 K/UL (ref 0–0.2)
BASOPHILS NFR BLD: 1.1 %
BILIRUB SERPL-MCNC: 0.6 MG/DL (ref 0–1)
BUN SERPL-MCNC: 28 MG/DL (ref 7–20)
CALCIUM SERPL-MCNC: 9.2 MG/DL (ref 8.3–10.6)
CHLORIDE SERPL-SCNC: 101 MMOL/L (ref 99–110)
CO2 SERPL-SCNC: 22 MMOL/L (ref 21–32)
CREAT SERPL-MCNC: 4.2 MG/DL (ref 0.6–1.1)
DEPRECATED RDW RBC AUTO: 16.6 % (ref 12.4–15.4)
EOSINOPHIL # BLD: 0.1 K/UL (ref 0–0.6)
EOSINOPHIL NFR BLD: 3.1 %
GFR SERPLBLD CREATININE-BSD FMLA CKD-EPI: 12 ML/MIN/{1.73_M2}
GLUCOSE SERPL-MCNC: 124 MG/DL (ref 70–99)
HCT VFR BLD AUTO: 32.8 % (ref 36–48)
HGB BLD-MCNC: 10.8 G/DL (ref 12–16)
LYMPHOCYTES # BLD: 1 K/UL (ref 1–5.1)
LYMPHOCYTES NFR BLD: 24.6 %
MCH RBC QN AUTO: 29.8 PG (ref 26–34)
MCHC RBC AUTO-ENTMCNC: 33 G/DL (ref 31–36)
MCV RBC AUTO: 90.2 FL (ref 80–100)
MONOCYTES # BLD: 0.4 K/UL (ref 0–1.3)
MONOCYTES NFR BLD: 9.7 %
NEUTROPHILS # BLD: 2.5 K/UL (ref 1.7–7.7)
NEUTROPHILS NFR BLD: 61.5 %
PLATELET # BLD AUTO: 128 K/UL (ref 135–450)
PMV BLD AUTO: 8.2 FL (ref 5–10.5)
POTASSIUM SERPL-SCNC: 4.5 MMOL/L (ref 3.5–5.1)
PROT SERPL-MCNC: 6.4 G/DL (ref 6.4–8.2)
RBC # BLD AUTO: 3.64 M/UL (ref 4–5.2)
SODIUM SERPL-SCNC: 138 MMOL/L (ref 136–145)
WBC # BLD AUTO: 4 K/UL (ref 4–11)

## 2025-06-20 PROCEDURE — 97535 SELF CARE MNGMENT TRAINING: CPT

## 2025-06-20 PROCEDURE — 90935 HEMODIALYSIS ONE EVALUATION: CPT

## 2025-06-20 PROCEDURE — 80053 COMPREHEN METABOLIC PANEL: CPT

## 2025-06-20 PROCEDURE — 97116 GAIT TRAINING THERAPY: CPT

## 2025-06-20 PROCEDURE — 6360000002 HC RX W HCPCS: Performed by: HOSPITALIST

## 2025-06-20 PROCEDURE — 85025 COMPLETE CBC W/AUTO DIFF WBC: CPT

## 2025-06-20 PROCEDURE — 6360000002 HC RX W HCPCS

## 2025-06-20 PROCEDURE — 6370000000 HC RX 637 (ALT 250 FOR IP)

## 2025-06-20 PROCEDURE — 6370000000 HC RX 637 (ALT 250 FOR IP): Performed by: HOSPITALIST

## 2025-06-20 PROCEDURE — 97530 THERAPEUTIC ACTIVITIES: CPT

## 2025-06-20 PROCEDURE — 2140000000 HC CCU INTERMEDIATE R&B

## 2025-06-20 RX ORDER — HYDRALAZINE HYDROCHLORIDE 20 MG/ML
5 INJECTION INTRAMUSCULAR; INTRAVENOUS EVERY 6 HOURS PRN
Status: DISCONTINUED | OUTPATIENT
Start: 2025-06-20 | End: 2025-06-21 | Stop reason: HOSPADM

## 2025-06-20 RX ORDER — METOPROLOL TARTRATE 50 MG
50 TABLET ORAL 2 TIMES DAILY
Status: DISCONTINUED | OUTPATIENT
Start: 2025-06-20 | End: 2025-06-21

## 2025-06-20 RX ORDER — HEPARIN SODIUM 1000 [USP'U]/ML
INJECTION, SOLUTION INTRAVENOUS; SUBCUTANEOUS
Status: DISPENSED
Start: 2025-06-20 | End: 2025-06-21

## 2025-06-20 RX ADMIN — HEPARIN SODIUM 5000 UNITS: 5000 INJECTION INTRAVENOUS; SUBCUTANEOUS at 05:55

## 2025-06-20 RX ADMIN — ASPIRIN 81 MG: 81 TABLET, DELAYED RELEASE ORAL at 12:56

## 2025-06-20 RX ADMIN — ONDANSETRON 4 MG: 2 INJECTION INTRAMUSCULAR; INTRAVENOUS at 20:53

## 2025-06-20 RX ADMIN — HYDRALAZINE HYDROCHLORIDE 5 MG: 20 INJECTION INTRAMUSCULAR; INTRAVENOUS at 16:47

## 2025-06-20 RX ADMIN — PANTOPRAZOLE SODIUM 40 MG: 40 TABLET, DELAYED RELEASE ORAL at 05:54

## 2025-06-20 RX ADMIN — CLONIDINE HYDROCHLORIDE 0.2 MG: 0.1 TABLET ORAL at 20:04

## 2025-06-20 RX ADMIN — TORSEMIDE 40 MG: 20 TABLET ORAL at 12:56

## 2025-06-20 RX ADMIN — OXYCODONE 5 MG: 5 TABLET ORAL at 16:47

## 2025-06-20 RX ADMIN — HEPARIN SODIUM 5000 UNITS: 5000 INJECTION INTRAVENOUS; SUBCUTANEOUS at 13:03

## 2025-06-20 RX ADMIN — CLONIDINE HYDROCHLORIDE 0.2 MG: 0.1 TABLET ORAL at 16:15

## 2025-06-20 RX ADMIN — AMLODIPINE BESYLATE 10 MG: 5 TABLET ORAL at 08:44

## 2025-06-20 RX ADMIN — PANTOPRAZOLE SODIUM 40 MG: 40 TABLET, DELAYED RELEASE ORAL at 16:15

## 2025-06-20 RX ADMIN — METOPROLOL TARTRATE 50 MG: 50 TABLET, FILM COATED ORAL at 12:57

## 2025-06-20 RX ADMIN — HEPARIN SODIUM 5000 UNITS: 5000 INJECTION INTRAVENOUS; SUBCUTANEOUS at 22:20

## 2025-06-20 RX ADMIN — PROCHLORPERAZINE EDISYLATE 5 MG: 5 INJECTION INTRAMUSCULAR; INTRAVENOUS at 15:37

## 2025-06-20 RX ADMIN — CLONIDINE HYDROCHLORIDE 0.2 MG: 0.1 TABLET ORAL at 08:44

## 2025-06-20 RX ADMIN — METOPROLOL TARTRATE 50 MG: 50 TABLET, FILM COATED ORAL at 20:05

## 2025-06-20 RX ADMIN — ACETAMINOPHEN 650 MG: 325 TABLET ORAL at 16:47

## 2025-06-20 ASSESSMENT — PAIN DESCRIPTION - ONSET: ONSET: ON-GOING

## 2025-06-20 ASSESSMENT — ENCOUNTER SYMPTOMS
PHOTOPHOBIA: 0
EYE DISCHARGE: 0
ABDOMINAL PAIN: 0
SHORTNESS OF BREATH: 0
COUGH: 0
CONSTIPATION: 0
ABDOMINAL DISTENTION: 0
EYE REDNESS: 0
DIARRHEA: 0
BLOOD IN STOOL: 0
NAUSEA: 0
VOMITING: 0
FACIAL SWELLING: 0
CHEST TIGHTNESS: 0

## 2025-06-20 ASSESSMENT — PAIN SCALES - GENERAL
PAINLEVEL_OUTOF10: 6
PAINLEVEL_OUTOF10: 0
PAINLEVEL_OUTOF10: 4
PAINLEVEL_OUTOF10: 5

## 2025-06-20 ASSESSMENT — PAIN DESCRIPTION - FREQUENCY: FREQUENCY: CONTINUOUS

## 2025-06-20 ASSESSMENT — PAIN DESCRIPTION - PAIN TYPE: TYPE: ACUTE PAIN

## 2025-06-20 ASSESSMENT — PAIN DESCRIPTION - LOCATION
LOCATION: SHOULDER
LOCATION: SHOULDER

## 2025-06-20 ASSESSMENT — PAIN DESCRIPTION - DESCRIPTORS
DESCRIPTORS: ACHING
DESCRIPTORS: ACHING

## 2025-06-20 ASSESSMENT — PAIN DESCRIPTION - ORIENTATION
ORIENTATION: RIGHT
ORIENTATION: RIGHT

## 2025-06-20 ASSESSMENT — PAIN - FUNCTIONAL ASSESSMENT: PAIN_FUNCTIONAL_ASSESSMENT: PREVENTS OR INTERFERES SOME ACTIVE ACTIVITIES AND ADLS

## 2025-06-20 NOTE — CARE COORDINATION
06/20/25 1425   IMM Letter   IMM Letter given to Patient/Family/Significant other/Guardian/POA/by: IMM given   IMM Letter date given: 06/20/25   IMM Letter time given: 0225

## 2025-06-20 NOTE — PROGRESS NOTES
[facility] - Inpatient Rehabilitation Department   Phone: [phone]    Occupational Therapy    [] Initial Evaluation            [x] Daily Treatment Note         [] Discharge Summary      Patient: Kristine Ramirez   : 1975   MRN: [id number]   Date of Service:  2025    Admitting Diagnosis:  Acute hypoxic respiratory failure (HCC)  Current Admission Summary:  Kristine Ramirez is a 50 y.o. female with pmh of stroke with residual right side deficit, essential hypertension end-stage renal disease on dialysis  who was recently admitted for vol overload with pul edema and was discharged on 25 presented with hx of fall and right shoulder pain at home. IN ER, found to have O2 sat 89% on RA.   Past Medical History:  has a past medical history of Acute respiratory failure due to COVID-19 (HCC), Arterial ischemic stroke, ICA, left, acute (HCC), Blind in both eyes, Cerebral artery occlusion with cerebral infarction (HCC), CHF (congestive heart failure) (HCC), Chronic kidney disease, COPD (chronic obstructive pulmonary disease) (HCC), Depression, Diabetes mellitus out of control, Diabetes mellitus, type II (HCC), Diabetic neuropathy associated with type 2 diabetes mellitus (HCC), Generalized headaches, Hypertension, Infertility, Insomnia, Migraine headache, Mixed hyperlipidemia, Otitis media, Pelvic abscess in female, Pneumonia, Stroke (HCC), and Stroke (HCC).  Past Surgical History:  has a past surgical history that includes Cervix surgery; eye surgery; Foot surgery (Right); Foot surgery (Bilateral); Foot surgery (Left); IR TUNNELED CVC PLACE WO SQ PORT/PUMP > 5 YEARS (2021); Dialysis fistula creation (Right, 2/10/2022); Upper gastrointestinal endoscopy (N/A, 2024); Upper gastrointestinal endoscopy (N/A, 2024); Cardiac procedure (N/A, 3/18/2025); and IR TUNNELED CVC PLACE WO SQ PORT/PUMP > 5 YEARS (2025).    Discharge Recommendations: Kristine Barba  James scored a 15/24 on the AM-PAC ADL Inpatient form. Current research shows that an AM-PAC score of 17 or less is typically not associated with a discharge to the patient's home setting. Based on the patient's AM-PAC score and their current ADL deficits, it is recommended that the patient have 3-5 sessions per week of Occupational Therapy at d/c to increase the patient's independence.  Please see assessment section for further patient specific details.    If patient discharges prior to next session this note will serve as a discharge summary.  Please see below for the latest assessment towards goals.      DME Required For Discharge: DME to be determined at next level of care, DME to be determined pending patient progress    Precautions/Restrictions: high fall risk  Weight Bearing Restrictions: no restrictions  [] Right Upper Extremity  [] Left Upper Extremity [] Right Lower Extremity  [] Left Lower Extremity     Required Braces/Orthotics: no braces required   [] Right  [] Left  Positional Restrictions:no positional restrictions    Pre-Admission Information   Lives With: spouse                  Type of Home: handicap accessible condo  Home Layout: one level  Home Access: level entry  Bathroom Layout: walk in shower  Bathroom Equipment: grab bars in shower, grab bars around toilet, shower chair, hand held shower head  Toilet Height: standard height  Home Equipment: rolling walker, manual wheelchair  Transfer Assistance: Independent without use of device but stated has had a decline over the last month  Ambulation Assistance:Independent without use of device, does not use device inside the house, uses wheelchair if going to dialysis or RW if going to store   ADL Assistance: requires assistance with bathing, patient performs own dressing   IADL Assistance: requires assistance with laundry, requires assistance with cleaning  Active :        [] Yes                 [x] No  Hand Dominance: [x] Left

## 2025-06-20 NOTE — PLAN OF CARE
Problem: Chronic Conditions and Co-morbidities  Goal: Patient's chronic conditions and co-morbidity symptoms are monitored and maintained or improved  Outcome: Progressing  Flowsheets (Taken 6/20/2025 0319)  Care Plan - Patient's Chronic Conditions and Co-Morbidity Symptoms are Monitored and Maintained or Improved: Monitor and assess patient's chronic conditions and comorbid symptoms for stability, deterioration, or improvement     Problem: Pain  Goal: Verbalizes/displays adequate comfort level or baseline comfort level  Outcome: Progressing  Flowsheets (Taken 6/20/2025 0319)  Verbalizes/displays adequate comfort level or baseline comfort level:   Encourage patient to monitor pain and request assistance   Assess pain using appropriate pain scale   Implement non-pharmacological measures as appropriate and evaluate response     Problem: Safety - Adult  Goal: Free from fall injury  Outcome: Progressing  Flowsheets (Taken 6/20/2025 0319)  Free From Fall Injury: Instruct family/caregiver on patient safety

## 2025-06-20 NOTE — PROGRESS NOTES
[facility] - Inpatient Rehabilitation Department   Phone: [phone]    Physical Therapy    [] Initial Evaluation            [x] Daily Treatment Note         [] Discharge Summary      Patient: Kristine Ramirez   : 1975   MRN: [id number]   Date of Service:  2025  Admitting Diagnosis: Acute hypoxic respiratory failure (HCC)  Current Admission Summary: Kristine Ramirez is a 50 y.o. female with pmh of stroke with residual right side deficit, essential hypertension end-stage renal disease on dialysis  who was recently admitted for vol overload with pul edema and was discharged on 25 presented with hx of fall and right shoulder pain at home. IN ER, found to have O2 sat 89% on RA.   Past Medical History:  has a past medical history of Acute respiratory failure due to COVID-19 (HCC), Arterial ischemic stroke, ICA, left, acute (HCC), Blind in both eyes, Cerebral artery occlusion with cerebral infarction (HCC), CHF (congestive heart failure) (HCC), Chronic kidney disease, COPD (chronic obstructive pulmonary disease) (HCC), Depression, Diabetes mellitus out of control, Diabetes mellitus, type II (HCC), Diabetic neuropathy associated with type 2 diabetes mellitus (HCC), Generalized headaches, Hypertension, Infertility, Insomnia, Migraine headache, Mixed hyperlipidemia, Otitis media, Pelvic abscess in female, Pneumonia, Stroke (HCC), and Stroke (HCC).  Past Surgical History:  has a past surgical history that includes Cervix surgery; eye surgery; Foot surgery (Right); Foot surgery (Bilateral); Foot surgery (Left); IR TUNNELED CVC PLACE WO SQ PORT/PUMP > 5 YEARS (2021); Dialysis fistula creation (Right, 2/10/2022); Upper gastrointestinal endoscopy (N/A, 2024); Upper gastrointestinal endoscopy (N/A, 2024); Cardiac procedure (N/A, 3/18/2025); and IR TUNNELED CVC PLACE WO SQ PORT/PUMP > 5 YEARS (2025).    Discharge Recommendations: Kristine Barba

## 2025-06-20 NOTE — PROGRESS NOTES
[facility]    Hospitalist Progress Note:      Name:  Kristine Ramirez /Age/Sex: 1975  (50 y.o. female)   MRN & CSN:  [id number] & [id number] Encounter Date: 2025    Location:  [id number]/2912-01 PCP: Damon Mireles MD     Attending:Graham Raza MD  Admission Date: 2025      Hospital Day: 3    Chief Complain/Reason for Admission:  Chief Complaint   Patient presents with    Fall     PATIENT ARRIVES FROM HOME VIA [facility] EMS DUE TO MECHANICAL FALL OF KELLY OUT OF BED; C/O RIGHT SHOULD PAIN; PATIENT TOOK 2MG OF [facility] ER PRIOR TO BED     Assessment:  Kristine Ramirez is a 50 y.o. female with pmh of stroke with residual right side deficit, essential hypertension end-stage renal disease on dialysis  who was recently admitted for vol overload with pul edema and was discharged on 25 presented with hx of fall and right shoulder pain at home. IN ER, found to have O2 sat 89% on RA.   AMS/Lethargy likely d/t sedative meds including alprazolam, lyrica and buspar: resolved.  S/p Fall at home d/t #1  R shoulder pain likely d/t contusion: no acute fx on XR  Hypoxia, mild: 89% on RA, on 1 L currently  Recent admission for Volume overload - improved with HD  Known right central vein occlusion  Right brachiocephalic AVF (2022)  ESRD on HD - MWFDM  HTN, accelarated  HLD  H/o CVA with residual right sided weakness    Plan:   Added metoprolol, cont norvasc, clonidine, IV hydralazine prn. Monitor BP closely  Cont HD per Nephrology team  Fluid and lytes monitoring.  Cont to hold alprazolam and lyrica. Cont burspar for reported hx of anxiety.  Cont 1 L oxygen, wean as able  Supportive care  Vascular surgery f/u as outpt  PT/OT recommended ECF, patient to give choice of ECF for precertification  Current meds reviewed, adjusted.   See orders  Diet ADULT DIET; Regular; Low Sodium (2 gm); Low Potassium (Less than 3000 mg/day); Low Phosphorus (Less  prochlorperazine, 5 mg, Q6H PRN  albuterol sulfate HFA, 2 puff, Q6H PRN  sodium chloride flush, 5-40 mL, PRN  sodium chloride, , PRN  ondansetron, 4 mg, Q8H PRN   Or  ondansetron, 4 mg, Q6H PRN  polyethylene glycol, 17 g, Daily PRN  acetaminophen, 650 mg, Q6H PRN   Or  acetaminophen, 650 mg, Q6H PRN  oxyCODONE, 5 mg, Q6H PRN  cloNIDine, 0.1 mg, Q8H PRN        LABS:  CBC:   Recent Labs     06/18/25  0350 06/19/25  0524 06/20/25  0440   WBC 4.4 4.0 4.0   HGB 11.0* 11.1* 10.8*    124* 128*     BMP:    Recent Labs     06/18/25  0350 06/19/25  0524 06/20/25  0440    138 138   K 4.6 4.1 4.5    101 101   CO2 22 23 22   BUN 28* 20 28*   CREATININE 4.6* 3.4* 4.2*   GLUCOSE 130* 132* 124*     Hepatic:   Recent Labs     06/19/25  0524 06/20/25  0440   AST 15 12*   ALT <5* <5*   BILITOT 0.6 0.6   ALKPHOS 144* 139*     Lipids:   Lab Results   Component Value Date/Time    CHOL 105 12/06/2024 06:05 AM    HDL 44 12/06/2024 06:05 AM    HDL 34 11/09/2011 02:25 PM    TRIG 66 12/06/2024 06:05 AM     Hemoglobin A1C:   Lab Results   Component Value Date/Time    LABA1C 6.7 03/21/2025 03:37 AM     TSH:   Lab Results   Component Value Date/Time    TSH 5.41 06/12/2025 08:14 PM    TSH 1.07 08/31/2024 01:20 PM     Troponin: No results found for: \"TROPONINT\"  Lactic Acid: No results for input(s): \"LACTA\" in the last 72 hours.  BNP:   Recent Labs     06/18/25  0350   PROBNP >70,000*     UA:  Lab Results   Component Value Date/Time    NITRU Negative 02/20/2022 01:35 PM    COLORU YELLOW 02/20/2022 01:35 PM    PHUR 6.5 02/20/2022 01:35 PM    PHUR 6.5 02/20/2022 01:35 PM    WBCUA 4 02/20/2022 01:35 PM    RBCUA 2 02/20/2022 01:35 PM    BACTERIA 3+ 01/15/2022 04:55 AM    CLARITYU Clear 02/20/2022 01:35 PM    LEUKOCYTESUR Negative 02/20/2022 01:35 PM    UROBILINOGEN 0.2 02/20/2022 01:35 PM    BILIRUBINUR Negative 02/20/2022 01:35 PM    BLOODU SMALL 02/20/2022 01:35 PM    GLUCOSEU Negative 02/20/2022 01:35 PM    KETUA Negative

## 2025-06-20 NOTE — PROGRESS NOTES
Treatment time: 3 hrs    Net UF: 3000 ml     Pre weight: 67.3 kg  Post weight: 64.3 kg     Access used: Ltdc  Access function:  tolerated well,  BFR 350ml/min     Medications or blood products given: heparin dwells     Regular outpatient schedule: MWF     Summary of response to treatment: Pt tolerated well. Pt remained stable throughout entire treatment and upon exiting the hemodialysis suite.      Copy of dialysis treatment record placed in chart, to be scanned into EMR.

## 2025-06-20 NOTE — PROGRESS NOTES
[facility] Note    Patient Active Problem List   Diagnosis    Type 2 diabetes mellitus with hyperlipidemia (HCC)    Mixed hyperlipidemia    Migraine headache    Anemia    Diabetic foot infection (HCC)    Pyogenic inflammation of bone (HCC)    History of medication noncompliance    Osteomyelitis of left foot (HCC)    Nephrotic syndrome    Peripheral edema    Pulmonary edema    Right sided numbness    Tobacco dependence    H/O: CVA (cerebrovascular accident)    HTN (hypertension), benign    DM (diabetes mellitus), secondary, uncontrolled, 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, associated with type 2 diabetes mellitus (HCC)    Cellulitis of right foot    Hidradenitis suppurativa    Hypocalcemia    Non-toxic multinodular goiter    Polyneuropathy due to type 2 diabetes mellitus (HCC)    Proliferative diabetic retinopathy associated with type 2 diabetes mellitus (HCC)    Epiglottitis    Recurrent falls    Hypotension    Leukocytosis    Volume overload    Hemodialysis catheter dysfunction    Hypoproteinemia    GABRIELA (obstructive sleep apnea)    Type 2 diabetes mellitus with obesity (HCC)    Wheelchair dependence    ESRD on dialysis (HCC)    Hyperkalemia    Suspected COVID-19 virus infection    Dependent on walker for ambulation    Medication management contract agreement - signed on 5/18/2023    Controlled drug dependence (HCC)    Generalized anxiety disorder with panic attacks    Coagulation defect    Chronic obstructive pulmonary disease, unspecified (HCC)    Abdominal distention    Encounter for assessment of ascites    Other ascites    Physical deconditioning    AMS (altered mental status)    Primary hypertension    Cardiomyopathy, unspecified type (HCC)    Pneumonia, bacterial

## 2025-06-20 NOTE — PROGRESS NOTES
Physical Therapy / Occupational Therapy    Kristine Ramirez    Patient off floor on attempt for therapy (dialysis). Will reattempt as patient schedule and status allow.     Thanks, Mansi Urbina PT, DPT #[id number] 6/20/2025   Phyllis Whaley, OTR/L [id number]

## 2025-06-21 VITALS
DIASTOLIC BLOOD PRESSURE: 73 MMHG | HEIGHT: 67 IN | RESPIRATION RATE: 16 BRPM | BODY MASS INDEX: 23.01 KG/M2 | SYSTOLIC BLOOD PRESSURE: 151 MMHG | TEMPERATURE: 97.6 F | OXYGEN SATURATION: 97 % | HEART RATE: 84 BPM | WEIGHT: 146.61 LBS

## 2025-06-21 PROCEDURE — 6370000000 HC RX 637 (ALT 250 FOR IP): Performed by: HOSPITALIST

## 2025-06-21 PROCEDURE — 6370000000 HC RX 637 (ALT 250 FOR IP)

## 2025-06-21 PROCEDURE — 6360000002 HC RX W HCPCS

## 2025-06-21 PROCEDURE — 2500000003 HC RX 250 WO HCPCS

## 2025-06-21 RX ORDER — HYDRALAZINE HYDROCHLORIDE 25 MG/1
50 TABLET, FILM COATED ORAL EVERY 8 HOURS SCHEDULED
Status: DISCONTINUED | OUTPATIENT
Start: 2025-06-21 | End: 2025-06-21 | Stop reason: HOSPADM

## 2025-06-21 RX ORDER — METOPROLOL TARTRATE 50 MG
100 TABLET ORAL 2 TIMES DAILY
Status: DISCONTINUED | OUTPATIENT
Start: 2025-06-21 | End: 2025-06-21 | Stop reason: HOSPADM

## 2025-06-21 RX ORDER — METOPROLOL TARTRATE 100 MG/1
100 TABLET ORAL 2 TIMES DAILY
Qty: 60 TABLET | Refills: 3 | Status: SHIPPED | OUTPATIENT
Start: 2025-06-21

## 2025-06-21 RX ORDER — HYDRALAZINE HYDROCHLORIDE 50 MG/1
50 TABLET, FILM COATED ORAL EVERY 8 HOURS SCHEDULED
Qty: 90 TABLET | Refills: 3 | Status: SHIPPED | OUTPATIENT
Start: 2025-06-21

## 2025-06-21 RX ADMIN — ONDANSETRON 4 MG: 2 INJECTION INTRAMUSCULAR; INTRAVENOUS at 07:47

## 2025-06-21 RX ADMIN — OXYCODONE 5 MG: 5 TABLET ORAL at 12:04

## 2025-06-21 RX ADMIN — METOPROLOL TARTRATE 50 MG: 50 TABLET, FILM COATED ORAL at 07:34

## 2025-06-21 RX ADMIN — CLONIDINE HYDROCHLORIDE 0.2 MG: 0.1 TABLET ORAL at 14:41

## 2025-06-21 RX ADMIN — HYDRALAZINE HYDROCHLORIDE 50 MG: 25 TABLET ORAL at 10:04

## 2025-06-21 RX ADMIN — AMLODIPINE BESYLATE 10 MG: 5 TABLET ORAL at 07:35

## 2025-06-21 RX ADMIN — OXYCODONE 5 MG: 5 TABLET ORAL at 05:07

## 2025-06-21 RX ADMIN — HEPARIN SODIUM 5000 UNITS: 5000 INJECTION INTRAVENOUS; SUBCUTANEOUS at 05:07

## 2025-06-21 RX ADMIN — PANTOPRAZOLE SODIUM 40 MG: 40 TABLET, DELAYED RELEASE ORAL at 07:34

## 2025-06-21 RX ADMIN — ASPIRIN 81 MG: 81 TABLET, DELAYED RELEASE ORAL at 07:35

## 2025-06-21 RX ADMIN — CLONIDINE HYDROCHLORIDE 0.2 MG: 0.1 TABLET ORAL at 07:34

## 2025-06-21 RX ADMIN — SODIUM CHLORIDE, PRESERVATIVE FREE 10 ML: 5 INJECTION INTRAVENOUS at 07:35

## 2025-06-21 RX ADMIN — TORSEMIDE 40 MG: 20 TABLET ORAL at 07:35

## 2025-06-21 ASSESSMENT — PAIN SCALES - GENERAL: PAINLEVEL_OUTOF10: 6

## 2025-06-21 ASSESSMENT — PAIN DESCRIPTION - LOCATION: LOCATION: BACK

## 2025-06-21 ASSESSMENT — PAIN DESCRIPTION - ORIENTATION: ORIENTATION: MID;LOWER

## 2025-06-21 ASSESSMENT — PAIN DESCRIPTION - DESCRIPTORS: DESCRIPTORS: ACHING

## 2025-06-21 NOTE — PROGRESS NOTES
[facility] Note    Patient Active Problem List   Diagnosis    Type 2 diabetes mellitus with hyperlipidemia (HCC)    Mixed hyperlipidemia    Migraine headache    Anemia    Diabetic foot infection (HCC)    Pyogenic inflammation of bone (HCC)    History of medication noncompliance    Osteomyelitis of left foot (HCC)    Nephrotic syndrome    Peripheral edema    Pulmonary edema    Right sided numbness    Tobacco dependence    H/O: CVA (cerebrovascular accident)    HTN (hypertension), benign    DM (diabetes mellitus), secondary, uncontrolled, 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, associated with type 2 diabetes mellitus (HCC)    Cellulitis of right foot    Hidradenitis suppurativa    Hypocalcemia    Non-toxic multinodular goiter    Polyneuropathy due to type 2 diabetes mellitus (HCC)    Proliferative diabetic retinopathy associated with type 2 diabetes mellitus (HCC)    Epiglottitis    Recurrent falls    Hypotension    Leukocytosis    Volume overload    Hemodialysis catheter dysfunction    Hypoproteinemia    GABRIELA (obstructive sleep apnea)    Type 2 diabetes mellitus with obesity (HCC)    Wheelchair dependence    ESRD on dialysis (HCC)    Hyperkalemia    Suspected COVID-19 virus infection    Dependent on walker for ambulation    Medication management contract agreement - signed on 5/18/2023    Controlled drug dependence (HCC)    Generalized anxiety disorder with panic attacks    Coagulation defect    Chronic obstructive pulmonary disease, unspecified (HCC)    Abdominal distention    Encounter for assessment of ascites    Other ascites    Physical deconditioning    AMS (altered mental status)    Primary hypertension    Cardiomyopathy, unspecified type (HCC)    Pneumonia, bacterial     Pericardial effusion    Hypervolemia    Acute hypoxic respiratory failure (HCC)    Fall at home, initial encounter    Accelerated hypertension       Past Medical History:   has a past medical history of Acute respiratory failure due to COVID-19 (HCC), Arterial ischemic stroke, ICA, left, acute (HCC), Blind in both eyes, Cerebral artery occlusion with cerebral infarction (HCC), CHF (congestive heart failure) (HCC), Chronic kidney disease, COPD (chronic obstructive pulmonary disease) (HCC), Depression, Diabetes mellitus out of control, Diabetes mellitus, type II (HCC), Diabetic neuropathy associated with type 2 diabetes mellitus (HCC), Generalized headaches, Hypertension, Infertility, Insomnia, Migraine headache, Mixed hyperlipidemia, Otitis media, Pelvic abscess in female, Pneumonia, Stroke (HCC), and Stroke (HCC).    Past Social History:   reports that she has been smoking cigarettes. She has a 15 pack-year smoking history. She has never used smokeless tobacco. She reports that she does not drink alcohol and does not use drugs.    Subjective / interval history / nephrology update / medical decision making:   Refer to assessment and plan for more details.   Patient was seen comfortably   ,   Reported no  active complaints/distress,   Recent renal labs, vital signs, input output reviewed.    Objective:      BP (!) 141/71   Pulse 82   Temp 98.1 °F (36.7 °C) (Temporal)   Resp 16   Ht 1.702 m (5' 7\")   Wt 66.5 kg (146 lb 9.7 oz)   LMP 09/01/2022   SpO2 92%   BMI 22.96 kg/m²     Wt Readings from Last 3 Encounters:   06/21/25 66.5 kg (146 lb 9.7 oz)   06/16/25 76.7 kg (169 lb 1.5 oz)   06/09/25 71 kg (156 lb 8.4 oz)       BP Readings from Last 3 Encounters:   06/21/25 (!) 141/71   06/17/25 (!) 163/78   06/09/25 (!) 163/93         Chest-rhonchi bilaterally  Heart-regular  Abd-soft  Ext-1+ edema    Labs  Hemoglobin   Date Value Ref Range Status   06/20/2025 10.8 (L) 12.0 - 16.0 g/dL Final     Hematocrit   Date Value

## 2025-06-21 NOTE — PROGRESS NOTES
Pt. Discharged to home now. Resting oxygen saturation 94% sitting up on the side of the bed. VSS, afebrile. PIV removed from left AC. Belongings included cell phone, , ear buds, a stringed sports bag with personal effects, and a night gown which she is wearing out of here.

## 2025-06-21 NOTE — DISCHARGE SUMMARY
[facility] [address]  [phone]    Call in 1 week(s)      Damon Mireles MD  [address]  [address]  [facility] [address]  [phone]          Dillon Hilliard MD  [address]   [address]  [facility] [address]  [phone]    Schedule an appointment as soon as possible for a visit in 1 week(s)        IMAGING STUDIES and PROCEDURES PERFORMED:   Imaging:  XR SHOULDER RIGHT (MIN 2 VIEWS)  Result Date: 6/18/2025  EXAMINATION: ONE XRAY VIEW OF THE CHEST; THREE XRAY VIEWS OF THE RIGHT SHOULDER 6/18/2025 2:18 am COMPARISON: 06/17/2025. HISTORY: ORDERING SYSTEM PROVIDED HISTORY: SOB TECHNOLOGIST PROVIDED HISTORY: Reason for exam:->SOB Reason for Exam: SOB; ORDERING SYSTEM PROVIDED HISTORY: fall from bed TECHNOLOGIST PROVIDED HISTORY: Reason for exam:->fall from bed Reason for Exam: fall from bed---R/shoulder pain FINDINGS: Chest: Cardiac silhouette enlargement.  Tunneled left internal jugular dialysis catheter in place.  At least moderate size left pleural effusion appears increased in the interval..  Similar appearance of right basilar opacity and probable small effusion.  Mild vascular congestion.  No pneumothorax identified.  Right subclavian stent in place.  No acute osseous abnormality identified. Right shoulder: No fracture or dislocation identified.  Mild degenerative change involving the acromioclavicular joint.     1. No acute osseous abnormality identified in the right shoulder. 2. At least moderate size left pleural effusion appears increased in the interval. Similar appearance of right basilar opacity and probable small effusion. 3. Mild vascular congestion.     XR CHEST PORTABLE  Result Date: 6/18/2025  EXAMINATION: ONE XRAY VIEW OF THE CHEST; THREE XRAY VIEWS OF THE RIGHT SHOULDER 6/18/2025 2:18 am COMPARISON: 06/17/2025. HISTORY: ORDERING SYSTEM PROVIDED HISTORY: SOB TECHNOLOGIST PROVIDED HISTORY: Reason for exam:->SOB Reason for Exam: SOB; ORDERING SYSTEM PROVIDED HISTORY: fall  Other ascites    Physical deconditioning    AMS (altered mental status)    Primary hypertension    Cardiomyopathy, unspecified type (HCC)    Pneumonia, bacterial    Pericardial effusion    Hypervolemia    Acute hypoxic respiratory failure (HCC)    Fall at home, initial encounter    Accelerated hypertension           Labs and Imaging   CBC:   Recent Labs     06/19/25  0524 06/20/25  0440   WBC 4.0 4.0   HGB 11.1* 10.8*   * 128*     BMP:    Recent Labs     06/19/25  0524 06/20/25  0440    138   K 4.1 4.5    101   CO2 23 22   BUN 20 28*   CREATININE 3.4* 4.2*   GLUCOSE 132* 124*     Hepatic:   Recent Labs     06/19/25  0524 06/20/25  0440   AST 15 12*   ALT <5* <5*   BILITOT 0.6 0.6   ALKPHOS 144* 139*     Lipids:   Lab Results   Component Value Date/Time    CHOL 105 12/06/2024 06:05 AM    HDL 44 12/06/2024 06:05 AM    HDL 34 11/09/2011 02:25 PM    TRIG 66 12/06/2024 06:05 AM     Hemoglobin A1C:   Lab Results   Component Value Date/Time    LABA1C 6.7 03/21/2025 03:37 AM     TSH:   Lab Results   Component Value Date/Time    TSH 5.41 06/12/2025 08:14 PM    TSH 1.07 08/31/2024 01:20 PM     Troponin: No results found for: \"TROPONINT\"  Lactic Acid: No results for input(s): \"LACTA\" in the last 72 hours.  BNP: No results for input(s): \"PROBNP\" in the last 72 hours.  UA:  Lab Results   Component Value Date/Time    NITRU Negative 02/20/2022 01:35 PM    COLORU YELLOW 02/20/2022 01:35 PM    PHUR 6.5 02/20/2022 01:35 PM    PHUR 6.5 02/20/2022 01:35 PM    WBCUA 4 02/20/2022 01:35 PM    RBCUA 2 02/20/2022 01:35 PM    BACTERIA 3+ 01/15/2022 04:55 AM    CLARITYU Clear 02/20/2022 01:35 PM    LEUKOCYTESUR Negative 02/20/2022 01:35 PM    UROBILINOGEN 0.2 02/20/2022 01:35 PM    BILIRUBINUR Negative 02/20/2022 01:35 PM    BLOODU SMALL 02/20/2022 01:35 PM    GLUCOSEU Negative 02/20/2022 01:35 PM    KETUA Negative 02/20/2022 01:35 PM     Urine Cultures:   Lab Results   Component Value Date/Time    LABURIN >100,000 CFU/ml

## 2025-06-29 NOTE — PROGRESS NOTES
allergy list will then be updated with the corresponding side effect(s) if it's deemed to be a true 'drug allergy'.    -The most common adverse effects of medication(s) were addressed at today's visit.    -Lastly, the coverage status of a medication may vary from insurance to insurance and the only way to verify if the medication is covered is to send an actual prescription in.    -The drug formulary of each insurance changes without any warning or notification to the healthcare provider let alone the pharmacy.  -The cost of medications vary from insurance to insurance and the cost is always subject to change just like the drug formulary.    Follow-up: Return in about 4 weeks (around 7/31/2025) for HTN..     Patient was informed that if his or her symptoms worsen to follow up with me sooner or go to the nearest ER if the symptoms are very significant and warrant higher level of care.    Regarding my note:  -This note was composed (by me only and not with assistance via a scribe) to the best of my knowledge and recollection of the encounter with the patient using one of my own customized note templates utilizing a combination of typing and dictating with the [employer] speech recognition software.  As a result, the note may possibly contain various errors (e.g. spelling, grammar, and non-sensible words/phrases/statements) despite reviewing the note prior to signing it for completion.      Time spent includes some or all of the following, both face-to-face time and non face-to-face time, but is not limited to:  [x] Preparing to see the patient by reviewing medical records available (notes, labs, imaging, etc.) prior to seeing the patient.  [x] Obtaining and/or reviewing the history from the patient.  [x] Performing a medically appropriate examination.  [x] Ordering of relevant lab work, medications, referrals, or procedures.  [x] Discussing patient's medical issues and formulating an assessment

## 2025-07-03 ENCOUNTER — OFFICE VISIT (OUTPATIENT)
Dept: FAMILY MEDICINE CLINIC | Age: 50
End: 2025-07-03
Payer: COMMERCIAL

## 2025-07-03 VITALS
HEART RATE: 83 BPM | SYSTOLIC BLOOD PRESSURE: 150 MMHG | OXYGEN SATURATION: 95 % | DIASTOLIC BLOOD PRESSURE: 80 MMHG | WEIGHT: 148.4 LBS | BODY MASS INDEX: 23.24 KG/M2

## 2025-07-03 DIAGNOSIS — Z99.3 WHEELCHAIR DEPENDENT: ICD-10-CM

## 2025-07-03 DIAGNOSIS — F13.20 BENZODIAZEPINE DEPENDENCE, CONTINUOUS (HCC): ICD-10-CM

## 2025-07-03 DIAGNOSIS — R09.02 HYPOXIA: ICD-10-CM

## 2025-07-03 DIAGNOSIS — Z71.89 ENCOUNTER FOR MEDICATION REVIEW AND COUNSELING: ICD-10-CM

## 2025-07-03 DIAGNOSIS — E11.22 TYPE 2 DM WITH HYPERTENSION AND ESRD ON DIALYSIS (HCC): ICD-10-CM

## 2025-07-03 DIAGNOSIS — I12.0 TYPE 2 DM WITH HYPERTENSION AND ESRD ON DIALYSIS (HCC): ICD-10-CM

## 2025-07-03 DIAGNOSIS — F41.0 GENERALIZED ANXIETY DISORDER WITH PANIC ATTACKS: ICD-10-CM

## 2025-07-03 DIAGNOSIS — N18.6 TYPE 2 DM WITH HYPERTENSION AND ESRD ON DIALYSIS (HCC): ICD-10-CM

## 2025-07-03 DIAGNOSIS — Z12.11 SCREEN FOR COLON CANCER: ICD-10-CM

## 2025-07-03 DIAGNOSIS — R06.09 CHRONIC DYSPNEA: ICD-10-CM

## 2025-07-03 DIAGNOSIS — Z99.2 TYPE 2 DM WITH HYPERTENSION AND ESRD ON DIALYSIS (HCC): ICD-10-CM

## 2025-07-03 DIAGNOSIS — F41.1 GENERALIZED ANXIETY DISORDER WITH PANIC ATTACKS: ICD-10-CM

## 2025-07-03 DIAGNOSIS — Z09 HOSPITAL DISCHARGE FOLLOW-UP: Primary | ICD-10-CM

## 2025-07-03 PROCEDURE — G8420 CALC BMI NORM PARAMETERS: HCPCS | Performed by: FAMILY MEDICINE

## 2025-07-03 PROCEDURE — 3017F COLORECTAL CA SCREEN DOC REV: CPT | Performed by: FAMILY MEDICINE

## 2025-07-03 PROCEDURE — 2022F DILAT RTA XM EVC RTNOPTHY: CPT | Performed by: FAMILY MEDICINE

## 2025-07-03 PROCEDURE — G8427 DOCREV CUR MEDS BY ELIG CLIN: HCPCS | Performed by: FAMILY MEDICINE

## 2025-07-03 PROCEDURE — 3079F DIAST BP 80-89 MM HG: CPT | Performed by: FAMILY MEDICINE

## 2025-07-03 PROCEDURE — 1111F DSCHRG MED/CURRENT MED MERGE: CPT | Performed by: FAMILY MEDICINE

## 2025-07-03 PROCEDURE — 3044F HG A1C LEVEL LT 7.0%: CPT | Performed by: FAMILY MEDICINE

## 2025-07-03 PROCEDURE — 4004F PT TOBACCO SCREEN RCVD TLK: CPT | Performed by: FAMILY MEDICINE

## 2025-07-03 PROCEDURE — 3077F SYST BP >= 140 MM HG: CPT | Performed by: FAMILY MEDICINE

## 2025-07-03 PROCEDURE — 99214 OFFICE O/P EST MOD 30 MIN: CPT | Performed by: FAMILY MEDICINE

## 2025-07-03 RX ORDER — DESVENLAFAXINE 25 MG/1
25 TABLET, EXTENDED RELEASE ORAL DAILY
Qty: 90 TABLET | Refills: 3 | Status: SHIPPED | OUTPATIENT
Start: 2025-07-03

## 2025-07-03 NOTE — PATIENT INSTRUCTIONS
Try Metoprolol morning and evening for 1 week w/o the Hydralazine. If the BP is still > 150 (top #) and/or > 90 (bottom #) then add Hydralazine as needed 1-3x/day. Continue other BP meds.

## 2025-07-10 ENCOUNTER — HOSPITAL ENCOUNTER (INPATIENT)
Age: 50
LOS: 2 days | Discharge: HOME | DRG: 640 | End: 2025-07-13
Attending: STUDENT IN AN ORGANIZED HEALTH CARE EDUCATION/TRAINING PROGRAM | Admitting: INTERNAL MEDICINE
Payer: COMMERCIAL

## 2025-07-10 ENCOUNTER — APPOINTMENT (OUTPATIENT)
Dept: GENERAL RADIOLOGY | Age: 50
DRG: 640 | End: 2025-07-10
Payer: COMMERCIAL

## 2025-07-10 DIAGNOSIS — E87.5 HYPERKALEMIA: Primary | ICD-10-CM

## 2025-07-10 DIAGNOSIS — J18.9 PNEUMONIA DUE TO INFECTIOUS ORGANISM, UNSPECIFIED LATERALITY, UNSPECIFIED PART OF LUNG: ICD-10-CM

## 2025-07-10 DIAGNOSIS — R53.81 MALAISE: ICD-10-CM

## 2025-07-10 DIAGNOSIS — Z91.158 NONCOMPLIANCE WITH RENAL DIALYSIS: ICD-10-CM

## 2025-07-10 DIAGNOSIS — E87.70 HYPERVOLEMIA, UNSPECIFIED HYPERVOLEMIA TYPE: ICD-10-CM

## 2025-07-10 DIAGNOSIS — J90 RECURRENT LEFT PLEURAL EFFUSION: ICD-10-CM

## 2025-07-10 DIAGNOSIS — E87.20 LACTIC ACIDOSIS: ICD-10-CM

## 2025-07-10 LAB
ANION GAP SERPL CALCULATED.3IONS-SCNC: 25 MMOL/L (ref 3–16)
BACTERIA URNS QL MICRO: NORMAL /HPF
BASE EXCESS BLDV CALC-SCNC: -4.9 MMOL/L (ref -3–3)
BASOPHILS # BLD: 0 K/UL (ref 0–0.2)
BASOPHILS NFR BLD: 0.4 %
BILIRUB UR QL STRIP.AUTO: NEGATIVE
BUN SERPL-MCNC: 74 MG/DL (ref 7–20)
CALCIUM SERPL-MCNC: 9.1 MG/DL (ref 8.3–10.6)
CHLORIDE SERPL-SCNC: 91 MMOL/L (ref 99–110)
CLARITY UR: CLEAR
CO2 BLDV-SCNC: 47 MMOL/L
CO2 SERPL-SCNC: 18 MMOL/L (ref 21–32)
COHGB MFR BLDV: 2.3 % (ref 0–1.5)
COLOR UR: YELLOW
CREAT SERPL-MCNC: 7.4 MG/DL (ref 0.6–1.1)
DEPRECATED RDW RBC AUTO: 16.8 % (ref 12.4–15.4)
DO-HGB MFR BLDV: 4 %
EOSINOPHIL # BLD: 0 K/UL (ref 0–0.6)
EOSINOPHIL NFR BLD: 0.1 %
EPI CELLS #/AREA URNS AUTO: 3 /HPF (ref 0–5)
FLUAV RNA RESP QL NAA+PROBE: NOT DETECTED
FLUBV RNA RESP QL NAA+PROBE: NOT DETECTED
GFR SERPLBLD CREATININE-BSD FMLA CKD-EPI: 6 ML/MIN/{1.73_M2}
GLUCOSE BLD-MCNC: 127 MG/DL (ref 70–99)
GLUCOSE BLD-MCNC: 197 MG/DL (ref 70–99)
GLUCOSE SERPL-MCNC: 135 MG/DL (ref 70–99)
GLUCOSE UR STRIP.AUTO-MCNC: 100 MG/DL
HCO3 BLDV-SCNC: 20 MMOL/L (ref 23–29)
HCT VFR BLD AUTO: 36.3 % (ref 36–48)
HGB BLD-MCNC: 12 G/DL (ref 12–16)
HGB UR QL STRIP.AUTO: NEGATIVE
HYALINE CASTS #/AREA URNS AUTO: 3 /LPF (ref 0–8)
KETONES UR STRIP.AUTO-MCNC: NEGATIVE MG/DL
LACTATE BLDV-SCNC: 2.9 MMOL/L (ref 0.4–1.9)
LEUKOCYTE ESTERASE UR QL STRIP.AUTO: NEGATIVE
LYMPHOCYTES # BLD: 0.8 K/UL (ref 1–5.1)
LYMPHOCYTES NFR BLD: 8.8 %
MCH RBC QN AUTO: 29.8 PG (ref 26–34)
MCHC RBC AUTO-ENTMCNC: 33.1 G/DL (ref 31–36)
MCV RBC AUTO: 90 FL (ref 80–100)
METHGB MFR BLDV: 1 %
MONOCYTES # BLD: 0.6 K/UL (ref 0–1.3)
MONOCYTES NFR BLD: 6.5 %
NEUTROPHILS # BLD: 8 K/UL (ref 1.7–7.7)
NEUTROPHILS NFR BLD: 84.2 %
NITRITE UR QL STRIP.AUTO: NEGATIVE
NT-PROBNP SERPL-MCNC: ABNORMAL PG/ML (ref 0–124)
O2 CT VFR BLDV CALC: 16 VOL %
O2 THERAPY: ABNORMAL
PCO2 BLDV: 35.7 MMHG (ref 40–50)
PERFORMED ON: ABNORMAL
PERFORMED ON: ABNORMAL
PH BLDV: 7.36 [PH] (ref 7.35–7.45)
PH UR STRIP.AUTO: 8 [PH] (ref 5–8)
PLATELET # BLD AUTO: 136 K/UL (ref 135–450)
PMV BLD AUTO: 7.9 FL (ref 5–10.5)
PO2 BLDV: 105 MMHG (ref 25–40)
POTASSIUM SERPL-SCNC: 6.5 MMOL/L (ref 3.5–5.1)
PROT UR STRIP.AUTO-MCNC: 300 MG/DL
RBC # BLD AUTO: 4.03 M/UL (ref 4–5.2)
RBC CLUMPS #/AREA URNS AUTO: 0 /HPF (ref 0–4)
SAO2 % BLDV: 95 %
SARS-COV-2 RNA RESP QL NAA+PROBE: NOT DETECTED
SODIUM SERPL-SCNC: 134 MMOL/L (ref 136–145)
SP GR UR STRIP.AUTO: 1.01 (ref 1–1.03)
TROPONIN, HIGH SENSITIVITY: 323 NG/L (ref 0–14)
TROPONIN, HIGH SENSITIVITY: 354 NG/L (ref 0–14)
UA COMPLETE W REFLEX CULTURE PNL UR: ABNORMAL
UA DIPSTICK W REFLEX MICRO PNL UR: YES
URN SPEC COLLECT METH UR: ABNORMAL
UROBILINOGEN UR STRIP-ACNC: 1 E.U./DL
WBC # BLD AUTO: 9.5 K/UL (ref 4–11)
WBC #/AREA URNS AUTO: 1 /HPF (ref 0–5)

## 2025-07-10 PROCEDURE — 99285 EMERGENCY DEPT VISIT HI MDM: CPT

## 2025-07-10 PROCEDURE — 82803 BLOOD GASES ANY COMBINATION: CPT

## 2025-07-10 PROCEDURE — 80048 BASIC METABOLIC PNL TOTAL CA: CPT

## 2025-07-10 PROCEDURE — 6360000002 HC RX W HCPCS: Performed by: STUDENT IN AN ORGANIZED HEALTH CARE EDUCATION/TRAINING PROGRAM

## 2025-07-10 PROCEDURE — 6370000000 HC RX 637 (ALT 250 FOR IP): Performed by: STUDENT IN AN ORGANIZED HEALTH CARE EDUCATION/TRAINING PROGRAM

## 2025-07-10 PROCEDURE — 71045 X-RAY EXAM CHEST 1 VIEW: CPT

## 2025-07-10 PROCEDURE — 2580000003 HC RX 258: Performed by: STUDENT IN AN ORGANIZED HEALTH CARE EDUCATION/TRAINING PROGRAM

## 2025-07-10 PROCEDURE — 94640 AIRWAY INHALATION TREATMENT: CPT

## 2025-07-10 PROCEDURE — 93005 ELECTROCARDIOGRAM TRACING: CPT | Performed by: STUDENT IN AN ORGANIZED HEALTH CARE EDUCATION/TRAINING PROGRAM

## 2025-07-10 PROCEDURE — 96375 TX/PRO/DX INJ NEW DRUG ADDON: CPT

## 2025-07-10 PROCEDURE — 87449 NOS EACH ORGANISM AG IA: CPT

## 2025-07-10 PROCEDURE — 87040 BLOOD CULTURE FOR BACTERIA: CPT

## 2025-07-10 PROCEDURE — 83880 ASSAY OF NATRIURETIC PEPTIDE: CPT

## 2025-07-10 PROCEDURE — 84484 ASSAY OF TROPONIN QUANT: CPT

## 2025-07-10 PROCEDURE — 85025 COMPLETE CBC W/AUTO DIFF WBC: CPT

## 2025-07-10 PROCEDURE — 2500000003 HC RX 250 WO HCPCS: Performed by: STUDENT IN AN ORGANIZED HEALTH CARE EDUCATION/TRAINING PROGRAM

## 2025-07-10 PROCEDURE — 83605 ASSAY OF LACTIC ACID: CPT

## 2025-07-10 PROCEDURE — 96365 THER/PROPH/DIAG IV INF INIT: CPT

## 2025-07-10 PROCEDURE — 87636 SARSCOV2 & INF A&B AMP PRB: CPT

## 2025-07-10 PROCEDURE — 81001 URINALYSIS AUTO W/SCOPE: CPT

## 2025-07-10 RX ORDER — SODIUM CHLORIDE 0.9 % (FLUSH) 0.9 %
5-40 SYRINGE (ML) INJECTION EVERY 12 HOURS SCHEDULED
Status: DISCONTINUED | OUTPATIENT
Start: 2025-07-10 | End: 2025-07-13 | Stop reason: HOSPADM

## 2025-07-10 RX ORDER — GLUCAGON 1 MG/ML
1 KIT INJECTION PRN
Status: DISCONTINUED | OUTPATIENT
Start: 2025-07-10 | End: 2025-07-13 | Stop reason: HOSPADM

## 2025-07-10 RX ORDER — SODIUM CHLORIDE 9 MG/ML
INJECTION, SOLUTION INTRAVENOUS PRN
Status: DISCONTINUED | OUTPATIENT
Start: 2025-07-10 | End: 2025-07-13 | Stop reason: HOSPADM

## 2025-07-10 RX ORDER — SODIUM CHLORIDE 0.9 % (FLUSH) 0.9 %
5-40 SYRINGE (ML) INJECTION PRN
Status: DISCONTINUED | OUTPATIENT
Start: 2025-07-10 | End: 2025-07-13 | Stop reason: HOSPADM

## 2025-07-10 RX ORDER — CALCIUM GLUCONATE 20 MG/ML
1000 INJECTION, SOLUTION INTRAVENOUS ONCE
Status: COMPLETED | OUTPATIENT
Start: 2025-07-10 | End: 2025-07-10

## 2025-07-10 RX ORDER — DEXTROSE MONOHYDRATE 100 MG/ML
INJECTION, SOLUTION INTRAVENOUS CONTINUOUS PRN
Status: DISCONTINUED | OUTPATIENT
Start: 2025-07-10 | End: 2025-07-13 | Stop reason: HOSPADM

## 2025-07-10 RX ORDER — FUROSEMIDE 10 MG/ML
40 INJECTION INTRAMUSCULAR; INTRAVENOUS ONCE
Status: COMPLETED | OUTPATIENT
Start: 2025-07-10 | End: 2025-07-10

## 2025-07-10 RX ORDER — SODIUM CHLORIDE, SODIUM LACTATE, POTASSIUM CHLORIDE, AND CALCIUM CHLORIDE .6; .31; .03; .02 G/100ML; G/100ML; G/100ML; G/100ML
500 INJECTION, SOLUTION INTRAVENOUS ONCE
Status: DISCONTINUED | OUTPATIENT
Start: 2025-07-10 | End: 2025-07-10

## 2025-07-10 RX ORDER — ALBUTEROL SULFATE 0.83 MG/ML
SOLUTION RESPIRATORY (INHALATION)
Status: DISCONTINUED
Start: 2025-07-10 | End: 2025-07-11

## 2025-07-10 RX ORDER — ALBUTEROL SULFATE 5 MG/ML
5 SOLUTION RESPIRATORY (INHALATION) ONCE
Status: COMPLETED | OUTPATIENT
Start: 2025-07-10 | End: 2025-07-10

## 2025-07-10 RX ADMIN — SODIUM CHLORIDE, PRESERVATIVE FREE 10 ML: 5 INJECTION INTRAVENOUS at 23:17

## 2025-07-10 RX ADMIN — SODIUM ZIRCONIUM CYCLOSILICATE 10 G: 10 POWDER, FOR SUSPENSION ORAL at 23:17

## 2025-07-10 RX ADMIN — INSULIN HUMAN 10 UNITS: 100 INJECTION, SOLUTION PARENTERAL at 23:11

## 2025-07-10 RX ADMIN — ALBUTEROL SULFATE 5 MG: 2.5 SOLUTION RESPIRATORY (INHALATION) at 22:54

## 2025-07-10 RX ADMIN — FUROSEMIDE 40 MG: 10 INJECTION, SOLUTION INTRAMUSCULAR; INTRAVENOUS at 23:17

## 2025-07-10 RX ADMIN — DEXTROSE 250 ML: 10 SOLUTION INTRAVENOUS at 23:13

## 2025-07-10 RX ADMIN — CALCIUM GLUCONATE 1000 MG: 20 INJECTION, SOLUTION INTRAVENOUS at 23:00

## 2025-07-10 ASSESSMENT — PAIN - FUNCTIONAL ASSESSMENT: PAIN_FUNCTIONAL_ASSESSMENT: 0-10

## 2025-07-10 ASSESSMENT — PAIN SCALES - GENERAL: PAINLEVEL_OUTOF10: 7

## 2025-07-11 LAB
ALBUMIN SERPL-MCNC: 3.6 G/DL (ref 3.4–5)
ANION GAP SERPL CALCULATED.3IONS-SCNC: 24 MMOL/L (ref 3–16)
BASOPHILS # BLD: 0.1 K/UL (ref 0–0.2)
BASOPHILS NFR BLD: 0.6 %
BUN SERPL-MCNC: 78 MG/DL (ref 7–20)
CALCIUM SERPL-MCNC: 8.9 MG/DL (ref 8.3–10.6)
CHLORIDE SERPL-SCNC: 94 MMOL/L (ref 99–110)
CO2 SERPL-SCNC: 18 MMOL/L (ref 21–32)
CREAT SERPL-MCNC: 7.6 MG/DL (ref 0.6–1.1)
DEPRECATED RDW RBC AUTO: 17.3 % (ref 12.4–15.4)
EKG ATRIAL RATE: 99 BPM
EKG DIAGNOSIS: NORMAL
EKG P AXIS: 51 DEGREES
EKG P-R INTERVAL: 170 MS
EKG Q-T INTERVAL: 362 MS
EKG QRS DURATION: 86 MS
EKG QTC CALCULATION (BAZETT): 464 MS
EKG R AXIS: 43 DEGREES
EKG T AXIS: 9 DEGREES
EKG VENTRICULAR RATE: 99 BPM
EOSINOPHIL # BLD: 0 K/UL (ref 0–0.6)
EOSINOPHIL NFR BLD: 0.1 %
GFR SERPLBLD CREATININE-BSD FMLA CKD-EPI: 6 ML/MIN/{1.73_M2}
GLUCOSE BLD-MCNC: 126 MG/DL (ref 70–99)
GLUCOSE BLD-MCNC: 127 MG/DL (ref 70–99)
GLUCOSE BLD-MCNC: 128 MG/DL (ref 70–99)
GLUCOSE BLD-MCNC: 144 MG/DL (ref 70–99)
GLUCOSE BLD-MCNC: 147 MG/DL (ref 70–99)
GLUCOSE BLD-MCNC: 161 MG/DL (ref 70–99)
GLUCOSE BLD-MCNC: 97 MG/DL (ref 70–99)
GLUCOSE SERPL-MCNC: 134 MG/DL (ref 70–99)
HCT VFR BLD AUTO: 36.7 % (ref 36–48)
HGB BLD-MCNC: 12.1 G/DL (ref 12–16)
LACTATE BLDV-SCNC: 2.7 MMOL/L (ref 0.4–1.9)
LYMPHOCYTES # BLD: 1.4 K/UL (ref 1–5.1)
LYMPHOCYTES NFR BLD: 13.6 %
MCH RBC QN AUTO: 29.7 PG (ref 26–34)
MCHC RBC AUTO-ENTMCNC: 32.8 G/DL (ref 31–36)
MCV RBC AUTO: 90.6 FL (ref 80–100)
MONOCYTES # BLD: 1 K/UL (ref 0–1.3)
MONOCYTES NFR BLD: 9.7 %
NEUTROPHILS # BLD: 7.9 K/UL (ref 1.7–7.7)
NEUTROPHILS NFR BLD: 76 %
PERFORMED ON: ABNORMAL
PERFORMED ON: NORMAL
PHOSPHATE SERPL-MCNC: 8.9 MG/DL (ref 2.5–4.9)
PLATELET # BLD AUTO: 125 K/UL (ref 135–450)
PMV BLD AUTO: 8.1 FL (ref 5–10.5)
POTASSIUM SERPL-SCNC: 3.7 MMOL/L (ref 3.5–5.1)
POTASSIUM SERPL-SCNC: 5.8 MMOL/L (ref 3.5–5.1)
POTASSIUM SERPL-SCNC: 6.1 MMOL/L (ref 3.5–5.1)
PROCALCITONIN SERPL IA-MCNC: 2.28 NG/ML (ref 0–0.15)
RBC # BLD AUTO: 4.06 M/UL (ref 4–5.2)
SODIUM SERPL-SCNC: 136 MMOL/L (ref 136–145)
WBC # BLD AUTO: 10.4 K/UL (ref 4–11)

## 2025-07-11 PROCEDURE — 93010 ELECTROCARDIOGRAM REPORT: CPT | Performed by: INTERNAL MEDICINE

## 2025-07-11 PROCEDURE — 5A1D70Z PERFORMANCE OF URINARY FILTRATION, INTERMITTENT, LESS THAN 6 HOURS PER DAY: ICD-10-PCS | Performed by: INTERNAL MEDICINE

## 2025-07-11 PROCEDURE — 85025 COMPLETE CBC W/AUTO DIFF WBC: CPT

## 2025-07-11 PROCEDURE — 2500000003 HC RX 250 WO HCPCS: Performed by: PHYSICIAN ASSISTANT

## 2025-07-11 PROCEDURE — 80069 RENAL FUNCTION PANEL: CPT

## 2025-07-11 PROCEDURE — 94150 VITAL CAPACITY TEST: CPT

## 2025-07-11 PROCEDURE — 36415 COLL VENOUS BLD VENIPUNCTURE: CPT

## 2025-07-11 PROCEDURE — 83605 ASSAY OF LACTIC ACID: CPT

## 2025-07-11 PROCEDURE — 6370000000 HC RX 637 (ALT 250 FOR IP)

## 2025-07-11 PROCEDURE — 94640 AIRWAY INHALATION TREATMENT: CPT

## 2025-07-11 PROCEDURE — 05HN33Z INSERTION OF INFUSION DEVICE INTO LEFT INTERNAL JUGULAR VEIN, PERCUTANEOUS APPROACH: ICD-10-PCS | Performed by: INTERNAL MEDICINE

## 2025-07-11 PROCEDURE — 6360000002 HC RX W HCPCS: Performed by: PHYSICIAN ASSISTANT

## 2025-07-11 PROCEDURE — 2580000003 HC RX 258: Performed by: STUDENT IN AN ORGANIZED HEALTH CARE EDUCATION/TRAINING PROGRAM

## 2025-07-11 PROCEDURE — 2060000000 HC ICU INTERMEDIATE R&B

## 2025-07-11 PROCEDURE — 6370000000 HC RX 637 (ALT 250 FOR IP): Performed by: PHYSICIAN ASSISTANT

## 2025-07-11 PROCEDURE — 96375 TX/PRO/DX INJ NEW DRUG ADDON: CPT

## 2025-07-11 PROCEDURE — 2500000003 HC RX 250 WO HCPCS: Performed by: STUDENT IN AN ORGANIZED HEALTH CARE EDUCATION/TRAINING PROGRAM

## 2025-07-11 PROCEDURE — 6370000000 HC RX 637 (ALT 250 FOR IP): Performed by: STUDENT IN AN ORGANIZED HEALTH CARE EDUCATION/TRAINING PROGRAM

## 2025-07-11 PROCEDURE — 6360000002 HC RX W HCPCS: Performed by: STUDENT IN AN ORGANIZED HEALTH CARE EDUCATION/TRAINING PROGRAM

## 2025-07-11 PROCEDURE — 94761 N-INVAS EAR/PLS OXIMETRY MLT: CPT

## 2025-07-11 PROCEDURE — 6360000002 HC RX W HCPCS

## 2025-07-11 PROCEDURE — 84132 ASSAY OF SERUM POTASSIUM: CPT

## 2025-07-11 PROCEDURE — 84145 PROCALCITONIN (PCT): CPT

## 2025-07-11 RX ORDER — SODIUM CHLORIDE 0.9 % (FLUSH) 0.9 %
5-40 SYRINGE (ML) INJECTION EVERY 12 HOURS SCHEDULED
Status: DISCONTINUED | OUTPATIENT
Start: 2025-07-11 | End: 2025-07-13 | Stop reason: HOSPADM

## 2025-07-11 RX ORDER — TORSEMIDE 20 MG/1
40 TABLET ORAL DAILY
Status: DISCONTINUED | OUTPATIENT
Start: 2025-07-11 | End: 2025-07-13 | Stop reason: HOSPADM

## 2025-07-11 RX ORDER — ASPIRIN 81 MG/1
81 TABLET ORAL DAILY
Status: DISCONTINUED | OUTPATIENT
Start: 2025-07-11 | End: 2025-07-13 | Stop reason: HOSPADM

## 2025-07-11 RX ORDER — CLONIDINE HYDROCHLORIDE 0.1 MG/1
0.2 TABLET ORAL 3 TIMES DAILY
Status: DISCONTINUED | OUTPATIENT
Start: 2025-07-11 | End: 2025-07-13 | Stop reason: HOSPADM

## 2025-07-11 RX ORDER — PANTOPRAZOLE SODIUM 40 MG/1
40 TABLET, DELAYED RELEASE ORAL
Status: DISCONTINUED | OUTPATIENT
Start: 2025-07-11 | End: 2025-07-13 | Stop reason: HOSPADM

## 2025-07-11 RX ORDER — IPRATROPIUM BROMIDE AND ALBUTEROL SULFATE 2.5; .5 MG/3ML; MG/3ML
1 SOLUTION RESPIRATORY (INHALATION)
Status: DISCONTINUED | OUTPATIENT
Start: 2025-07-11 | End: 2025-07-13 | Stop reason: HOSPADM

## 2025-07-11 RX ORDER — SENNOSIDES 8.6 MG/1
1 TABLET ORAL DAILY PRN
Status: DISCONTINUED | OUTPATIENT
Start: 2025-07-11 | End: 2025-07-13 | Stop reason: HOSPADM

## 2025-07-11 RX ORDER — HEPARIN SODIUM 1000 [USP'U]/ML
3600 INJECTION, SOLUTION INTRAVENOUS; SUBCUTANEOUS PRN
Status: DISCONTINUED | OUTPATIENT
Start: 2025-07-11 | End: 2025-07-13 | Stop reason: HOSPADM

## 2025-07-11 RX ORDER — VENLAFAXINE HYDROCHLORIDE 37.5 MG/1
37.5 CAPSULE, EXTENDED RELEASE ORAL
Status: DISCONTINUED | OUTPATIENT
Start: 2025-07-11 | End: 2025-07-13 | Stop reason: HOSPADM

## 2025-07-11 RX ORDER — ACETAMINOPHEN 650 MG/1
650 SUPPOSITORY RECTAL EVERY 6 HOURS PRN
Status: DISCONTINUED | OUTPATIENT
Start: 2025-07-11 | End: 2025-07-13 | Stop reason: HOSPADM

## 2025-07-11 RX ORDER — ALBUTEROL SULFATE 90 UG/1
2 INHALANT RESPIRATORY (INHALATION) EVERY 6 HOURS PRN
Status: DISCONTINUED | OUTPATIENT
Start: 2025-07-11 | End: 2025-07-13 | Stop reason: HOSPADM

## 2025-07-11 RX ORDER — SEVELAMER CARBONATE 0.8 G/1
2.4 POWDER, FOR SUSPENSION ORAL
Status: DISCONTINUED | OUTPATIENT
Start: 2025-07-11 | End: 2025-07-13 | Stop reason: HOSPADM

## 2025-07-11 RX ORDER — HEPARIN SODIUM 5000 [USP'U]/ML
5000 INJECTION, SOLUTION INTRAVENOUS; SUBCUTANEOUS EVERY 8 HOURS SCHEDULED
Status: DISCONTINUED | OUTPATIENT
Start: 2025-07-11 | End: 2025-07-13 | Stop reason: HOSPADM

## 2025-07-11 RX ORDER — SODIUM CHLORIDE 9 MG/ML
INJECTION, SOLUTION INTRAVENOUS PRN
Status: DISCONTINUED | OUTPATIENT
Start: 2025-07-11 | End: 2025-07-13 | Stop reason: HOSPADM

## 2025-07-11 RX ORDER — AZITHROMYCIN MONOHYDRATE 500 MG/5ML
500 INJECTION, POWDER, LYOPHILIZED, FOR SOLUTION INTRAVENOUS ONCE
Status: DISCONTINUED | OUTPATIENT
Start: 2025-07-11 | End: 2025-07-11 | Stop reason: SDUPTHER

## 2025-07-11 RX ORDER — SODIUM CHLORIDE 0.9 % (FLUSH) 0.9 %
5-40 SYRINGE (ML) INJECTION PRN
Status: DISCONTINUED | OUTPATIENT
Start: 2025-07-11 | End: 2025-07-13 | Stop reason: HOSPADM

## 2025-07-11 RX ORDER — HYDRALAZINE HYDROCHLORIDE 25 MG/1
50 TABLET, FILM COATED ORAL EVERY 8 HOURS SCHEDULED
Status: DISCONTINUED | OUTPATIENT
Start: 2025-07-11 | End: 2025-07-13 | Stop reason: HOSPADM

## 2025-07-11 RX ORDER — BUSPIRONE HYDROCHLORIDE 5 MG/1
10 TABLET ORAL 2 TIMES DAILY
Status: DISCONTINUED | OUTPATIENT
Start: 2025-07-11 | End: 2025-07-13 | Stop reason: HOSPADM

## 2025-07-11 RX ORDER — ACETAMINOPHEN 325 MG/1
650 TABLET ORAL EVERY 6 HOURS PRN
Status: DISCONTINUED | OUTPATIENT
Start: 2025-07-11 | End: 2025-07-13 | Stop reason: HOSPADM

## 2025-07-11 RX ORDER — AMLODIPINE BESYLATE 5 MG/1
5 TABLET ORAL DAILY
Status: DISCONTINUED | OUTPATIENT
Start: 2025-07-11 | End: 2025-07-13 | Stop reason: HOSPADM

## 2025-07-11 RX ORDER — ONDANSETRON 2 MG/ML
4 INJECTION INTRAMUSCULAR; INTRAVENOUS EVERY 6 HOURS PRN
Status: DISCONTINUED | OUTPATIENT
Start: 2025-07-11 | End: 2025-07-13 | Stop reason: HOSPADM

## 2025-07-11 RX ORDER — HEPARIN SODIUM 1000 [USP'U]/ML
INJECTION, SOLUTION INTRAVENOUS; SUBCUTANEOUS
Status: COMPLETED
Start: 2025-07-11 | End: 2025-07-11

## 2025-07-11 RX ORDER — LACTOBACILLUS RHAMNOSUS GG 10B CELL
1 CAPSULE ORAL 2 TIMES DAILY WITH MEALS
Status: DISCONTINUED | OUTPATIENT
Start: 2025-07-11 | End: 2025-07-13 | Stop reason: HOSPADM

## 2025-07-11 RX ORDER — METOPROLOL TARTRATE 50 MG
100 TABLET ORAL 2 TIMES DAILY
Status: DISCONTINUED | OUTPATIENT
Start: 2025-07-11 | End: 2025-07-13 | Stop reason: HOSPADM

## 2025-07-11 RX ORDER — INSULIN LISPRO 100 [IU]/ML
0-4 INJECTION, SOLUTION INTRAVENOUS; SUBCUTANEOUS
Status: DISCONTINUED | OUTPATIENT
Start: 2025-07-11 | End: 2025-07-13 | Stop reason: HOSPADM

## 2025-07-11 RX ORDER — IPRATROPIUM BROMIDE AND ALBUTEROL SULFATE 2.5; .5 MG/3ML; MG/3ML
1 SOLUTION RESPIRATORY (INHALATION) EVERY 4 HOURS PRN
Status: DISCONTINUED | OUTPATIENT
Start: 2025-07-11 | End: 2025-07-11

## 2025-07-11 RX ORDER — HYDRALAZINE HYDROCHLORIDE 20 MG/ML
5 INJECTION INTRAMUSCULAR; INTRAVENOUS EVERY 4 HOURS PRN
Status: DISCONTINUED | OUTPATIENT
Start: 2025-07-11 | End: 2025-07-13 | Stop reason: HOSPADM

## 2025-07-11 RX ADMIN — SODIUM CHLORIDE, PRESERVATIVE FREE 10 ML: 5 INJECTION INTRAVENOUS at 07:57

## 2025-07-11 RX ADMIN — ASPIRIN 81 MG: 81 TABLET, COATED ORAL at 07:54

## 2025-07-11 RX ADMIN — SODIUM CHLORIDE, PRESERVATIVE FREE 10 ML: 5 INJECTION INTRAVENOUS at 22:11

## 2025-07-11 RX ADMIN — WATER 1000 MG: 1 INJECTION INTRAMUSCULAR; INTRAVENOUS; SUBCUTANEOUS at 01:51

## 2025-07-11 RX ADMIN — METOPROLOL TARTRATE 100 MG: 50 TABLET, FILM COATED ORAL at 07:54

## 2025-07-11 RX ADMIN — METOPROLOL TARTRATE 100 MG: 50 TABLET, FILM COATED ORAL at 22:11

## 2025-07-11 RX ADMIN — BUSPIRONE HYDROCHLORIDE 10 MG: 5 TABLET ORAL at 22:11

## 2025-07-11 RX ADMIN — PANTOPRAZOLE SODIUM 40 MG: 40 TABLET, DELAYED RELEASE ORAL at 18:18

## 2025-07-11 RX ADMIN — VENLAFAXINE HYDROCHLORIDE 37.5 MG: 37.5 CAPSULE, EXTENDED RELEASE ORAL at 07:54

## 2025-07-11 RX ADMIN — HEPARIN SODIUM 3600 UNITS: 1000 INJECTION, SOLUTION INTRAVENOUS; SUBCUTANEOUS at 16:07

## 2025-07-11 RX ADMIN — HEPARIN SODIUM 5000 UNITS: 5000 INJECTION INTRAVENOUS; SUBCUTANEOUS at 06:11

## 2025-07-11 RX ADMIN — AZITHROMYCIN MONOHYDRATE 500 MG: 500 INJECTION, POWDER, LYOPHILIZED, FOR SOLUTION INTRAVENOUS at 02:01

## 2025-07-11 RX ADMIN — SODIUM ZIRCONIUM CYCLOSILICATE 10 G: 10 POWDER, FOR SUSPENSION ORAL at 07:59

## 2025-07-11 RX ADMIN — SEVELAMER CARBONATE FOR ORAL SUSPENSION 2.4 G: 800 POWDER, FOR SUSPENSION ORAL at 12:02

## 2025-07-11 RX ADMIN — BUSPIRONE HYDROCHLORIDE 10 MG: 5 TABLET ORAL at 07:54

## 2025-07-11 RX ADMIN — CLONIDINE HYDROCHLORIDE 0.2 MG: 0.1 TABLET ORAL at 18:18

## 2025-07-11 RX ADMIN — HYDRALAZINE HYDROCHLORIDE 50 MG: 25 TABLET ORAL at 22:11

## 2025-07-11 RX ADMIN — AMLODIPINE BESYLATE 5 MG: 5 TABLET ORAL at 07:54

## 2025-07-11 RX ADMIN — IPRATROPIUM BROMIDE AND ALBUTEROL SULFATE 1 DOSE: .5; 3 SOLUTION RESPIRATORY (INHALATION) at 21:33

## 2025-07-11 RX ADMIN — CLONIDINE HYDROCHLORIDE 0.2 MG: 0.1 TABLET ORAL at 22:11

## 2025-07-11 RX ADMIN — TORSEMIDE 40 MG: 20 TABLET ORAL at 07:54

## 2025-07-11 RX ADMIN — HEPARIN SODIUM 5000 UNITS: 5000 INJECTION INTRAVENOUS; SUBCUTANEOUS at 22:11

## 2025-07-11 RX ADMIN — SEVELAMER CARBONATE FOR ORAL SUSPENSION 2.4 G: 800 POWDER, FOR SUSPENSION ORAL at 07:54

## 2025-07-11 RX ADMIN — HYDRALAZINE HYDROCHLORIDE 50 MG: 25 TABLET ORAL at 06:11

## 2025-07-11 RX ADMIN — IPRATROPIUM BROMIDE AND ALBUTEROL SULFATE 1 DOSE: .5; 3 SOLUTION RESPIRATORY (INHALATION) at 09:06

## 2025-07-11 RX ADMIN — PANTOPRAZOLE SODIUM 40 MG: 40 TABLET, DELAYED RELEASE ORAL at 06:11

## 2025-07-11 RX ADMIN — SEVELAMER CARBONATE FOR ORAL SUSPENSION 2.4 G: 800 POWDER, FOR SUSPENSION ORAL at 18:18

## 2025-07-11 RX ADMIN — CLONIDINE HYDROCHLORIDE 0.2 MG: 0.1 TABLET ORAL at 07:54

## 2025-07-11 RX ADMIN — Medication 1 CAPSULE: at 18:18

## 2025-07-11 RX ADMIN — Medication 1 CAPSULE: at 07:54

## 2025-07-11 ASSESSMENT — PAIN SCALES - GENERAL
PAINLEVEL_OUTOF10: 8
PAINLEVEL_OUTOF10: 0
PAINLEVEL_OUTOF10: 8

## 2025-07-11 ASSESSMENT — PAIN DESCRIPTION - DESCRIPTORS: DESCRIPTORS: ACHING

## 2025-07-11 ASSESSMENT — PAIN DESCRIPTION - LOCATION
LOCATION: BACK
LOCATION: SHOULDER

## 2025-07-11 ASSESSMENT — PAIN DESCRIPTION - ORIENTATION: ORIENTATION: RIGHT

## 2025-07-11 NOTE — CARE COORDINATION
Case Management Assessment  Initial Evaluation    Date/Time of Evaluation: 7/11/2025 4:34 PM  Assessment Completed by: Kody Arredondo    If patient is discharged prior to next notation, then this note serves as note for discharge by case management.    Patient Name: Kristine Ramirez                   YOB: 1975  Diagnosis: Hyperkalemia [E87.5]  Lactic acidosis [E87.20]  Malaise [R53.81]  Noncompliance with renal dialysis [Z91.158]  Recurrent left pleural effusion [J90]  Hypervolemia, unspecified hypervolemia type [E87.70]  Pneumonia due to infectious organism, unspecified laterality, unspecified part of lung [J18.9]                   Date / Time: 7/10/2025  9:56 PM    Patient Admission Status: Inpatient   Readmission Risk (Low < 19, Mod (19-27), High > 27): Readmission Risk Score: 31.5    Current PCP: Damon Mireles MD  PCP verified by CM? Yes    Chart Reviewed: Yes      History Provided by: Patient  Patient Orientation: Alert and Oriented    Patient Cognition: Alert    Hospitalization in the last 30 days (Readmission):  Yes    If yes, Readmission Assessment in CM Navigator will be completed.    Advance Directives:      Code Status: Full Code   Patient's Primary Decision Maker is: Legal Next of Kin    Primary Decision Maker: Kannan Ramirez - Spouse - [phone]    Discharge Planning:    Patient lives with: (P) Spouse/Significant Other Type of Home: (P) House  Primary Care Giver: Spouse  Patient Support Systems include: Spouse/Significant Other   Current Financial resources: Medicare, Medicaid  Current community resources: None  Current services prior to admission: (P) None            Current DME:              Type of Home Care services:  (P) OT, PT, Nursing Services    ADLS  Prior functional level: Assistance with the following:, Mobility, Shopping, Housework, Cooking, Feeding, Toileting, Dressing, Bathing  Current functional level: Assistance with the following:, Bathing, Dressing, Toileting,

## 2025-07-11 NOTE — ED PROVIDER NOTES
[facility] [address] [place] [address]  EMERGENCY DEPARTMENT ENCOUNTER        Pt Name: Kristine Ramirez  MRN: [id number]  Birthdate 1975  Date of evaluation: 7/10/2025  Provider: RIVERA Moore - CNP  PCP: Damon Mireles MD  Note Started: 1:24 AM EDT 7/11/25       I have seen and evaluated this patient with my supervising physician Ezio Mir DO.      CHIEF COMPLAINT       Chief Complaint   Patient presents with    Illness     Pt presents to ed via [facility], with several complaints including general illness, shoulder pain, fatigue, lack of appetite, and concern for medication reaction to metoprolol and/or spironalactone. PT is a dialysis patient and hasn't been since last Friday.        HISTORY OF PRESENT ILLNESS: 1 or more Elements     History From: patient and ems  Limitations to history : None    Kristine Ramirez is a 50 y.o. female who presents to the emergency department with complaints of feeling generally unwell over the past 1 week.  States that she is unable to eat or take her medications today.  She is a hemodialysis patient, Monday Wednesday Friday but has not been to dialysis for at least 6 days.  States that she was feeling too poorly today and decided not to go.  She is complaining of some shortness of breath, bilateral shoulder pain.  She does appear chronically unwell.    Denies any headache, fever, lightheadedness, dizziness, visual disturbances.  No chest pain or pressure.  No neck or back pain.  No abdominal pain, nausea, vomiting, diarrhea, constipation, or dysuria.  No rash.    Nursing Notes were all reviewed and agreed with or any disagreements were addressed in the HPI.    REVIEW OF SYSTEMS :      Review of Systems   Constitutional:  Positive for fatigue. Negative for activity change, chills and fever.   Respiratory:  Positive for shortness of breath. Negative for chest tightness.    Cardiovascular:  Negative for chest pain.   Gastrointestinal:  Negative 
Glucose   Result Value Ref Range    POC Glucose 197 (H) 70 - 99 mg/dl    Performed on ACCU-CHEK    POCT Glucose   Result Value Ref Range    POC Glucose 161 (H) 70 - 99 mg/dl    Performed on ACCU-CHEK    EKG 12 Lead   Result Value Ref Range    Ventricular Rate 99 BPM    Atrial Rate 99 BPM    P-R Interval 170 ms    QRS Duration 86 ms    Q-T Interval 362 ms    QTc Calculation (Bazett) 464 ms    P Axis 51 degrees    R Axis 43 degrees    T Axis 9 degrees    Diagnosis Normal sinus rhythmLow voltage QRSBorderline ECG          IMAGING RESULTS     XR CHEST PORTABLE   Final Result   1. Moderate left-sided pleural effusion with associated parenchymal airspace   disease throughout the left lung concerning for multifocal pneumonia.   2. Mild vascular congestion.         Any applicable radiology studies including x-ray, CT, MRI, and/or ultrasound were reviewed independently by me in addition to the radiologist.  I reviewed all radiology images and reports as well from this evaluation.        RISK SCORES     I utilized the following risk stratification rules:  Elver Coma Scale  Eye Opening: Spontaneous  Best Verbal Response: Oriented  Best Motor Response: Obeys commands  Elver Coma Scale Score: 15         MEDICAL DECISION MAKING     In addition to the advanced practice provider, I personally saw Kristine Ramirez and performed a substantive portion of the visit including all aspects of the medical decision making. I made/approved the management plan and take responsibility for the patient management     Patient presented with Illness (Pt presents to ed via [facility], with several complaints including general illness, shoulder pain, fatigue, lack of appetite, and concern for medication reaction to metoprolol and/or spironalactone. PT is a dialysis patient and hasn't been since last Friday. ) as described above.  History obtained from patient and KANDY.  Prior medical history reviewed.  Initial vital signs reviewed,

## 2025-07-11 NOTE — H&P
Hospital Medicine History & Physical        Name:  Kristine Ramirez /Age/Sex: 1975  (50 y.o. female)   MRN & CSN:  [id number] & [id number] Encounter Date/Time: 2025 1:30 AM EDT   Location:  ED- PCP: Damon Mireles MD         CHIEF COMPLAINT:   Chief Complaint   Patient presents with    Illness     Pt presents to ed via [facility], with several complaints including general illness, shoulder pain, fatigue, lack of appetite, and concern for medication reaction to metoprolol and/or spironalactone. PT is a dialysis patient and hasn't been since last Friday.          HISTORY OF PRESENT ILLNESS:      History from: patient  Limitations to history : None     Kristine Ramirez is a 50 y.o. female who presented to ED for evaluation of generally feeling unwell.  She reports symptoms have worsened over the last couple days.  She has ESRD on HD but reports she has not been to dialysis for 6 days.  She denies chest pain, shortness of breath, cough, GI symptoms.  She denies any specific complaints or concerns.  Workup initiated in ED.  Potassium elevated at 6.5.  Patient was given albuterol, calcium gluconate 1g, Lasix 40 Mg IV, 10 units regular insulin IV, 250 mL bolus D10, and Lokelma 10g.  CXR notable for moderate left-sided pleural effusion with associated airspace disease concerning for multifocal pneumonia; mild vascular congestion noted as well.    REVIEW OF SYSTEMS:   Pertinent positives as noted in the HPI. All other systems reviewed and negative.      PHYSICAL EXAM PERFORMED:  BP (!) 177/92   Pulse 99   Temp 97.7 °F (36.5 °C) (Oral)   Resp 16   Ht 1.702 m (5' 7\")   Wt 63 kg (139 lb)   LMP 2022   SpO2 90%   BMI 21.77 kg/m²       General appearance:  No apparent distress, appears stated age and cooperative.   Respiratory:  Clear to auscultation bilaterally without rales, wheezes, or rhonchi.  Cardiovascular:  Regular rate rhythm without murmurs, rubs or

## 2025-07-11 NOTE — CARE COORDINATION
07/11/25 0803   Readmission Assessment   Number of Days since last admission? 8-30 days   Previous Disposition Home with Family   Who is being Interviewed Unable to Complete  (chart review)   What was the patient's/caregiver's perception as to why they think they needed to return back to the hospital? Other (Comment)  (Pt is admitted for hyperkalemia)   Did you visit your Primary Care Physician after you left the hospital, before you returned this time? Yes   Did you see a specialist, such as Cardiac, Pulmonary, Orthopedic Physician, etc. after you left the hospital? No   Who advised the patient to return to the hospital? Self-referral   Does the patient report anything that got in the way of taking their medications? No   In our efforts to provide the best possible care to you and others like you, can you think of anything that we could have done to help you after you left the hospital the first time, so that you might not have needed to return so soon? Other (Comment)  (Pt is admitted for hyperkalemia)     Electronically signed by Kody Arredondo on 7/11/2025 at 8:04 AM

## 2025-07-11 NOTE — CONSULTS
MD Isaac Jeong MD Aldo Estella, DO              Office: [phone]                      Fax: [phone]          NEPHROLOGY INITIAL CONSULT NOTE:     PATIENT NAME: Kristine Ramirez  : 1975  MRN: [id number]  REASON FOR CONSULT: I am asked to see this patient in consultation for my opinion regarding management of ESRD. My recommendations will be communicated by way of shared medical record.      IMPRESSION / RECOMMENDATION:      Admitted on:  7/10/2025  For:    Hospital Problems           Last Modified POA    * (Principal) Hyperkalemia 2025 Yes       1. ESRD on iHD: MWF.   - outpt HD center: [facility] with   - Access: Hemodialysis-AV Fistula-Standard, Right Upper Arm, Other/Unknown  Access Placed on 2022     -last dialysis outpt: 2025, has missed 6 days  - next HD urgently added on  and Saturday again  --for better solutes and volume control  - Encouraged compliance due to potentially life-threatening issue        2. Hypertension/Volume Status:  - BP hypertension, slightly uncontrolled continue current plan, okay to keep slightly higher-to give more room for fluid removal with dialysis:   - Na natremia mild chronic improving, follow with dialysis  - EDW 63.5 kg : Above dry weight,  - fluid removal to DW as tolerated    3. Electrolytes/acid-base:  K: Hyperkalemia, due to missing dialysis follow-up with low K bath, low K diet  Medication management given including low potassium binder  Follow-up closely  Refer to the orders    High AG metabolic acidosis: Not controlled, with lactic acidosis  Follow-up with dialysis    4. Anemia of renal failure: at goal  - Iron: or - RIA: not needed   with HD    5. BMD:  - Phos, calcium - follow in morning labs-     - follow iPTH outpt      Other hx---  4.  History of pericardial effusion  5.  History of CVA  6.  History of systolic CHF-volume removal as tolerated with HD        : Other

## 2025-07-11 NOTE — PROCEDURES
MD Isaac Jeong MD Aldo Estella, DO                 Office: [phone]                 Fax: [phone]         [facility]                       HEMODIALYSIS  inpatient visit note:     PATIENT NAME: Kristine Ramirez  : 1975  MRN: [id number]    Indication for Dialysis: ESRD    Patient seen on dialysis treatment.  Tolerating treatment  Fairly well    Vitals:    25 1109   BP: (!) 154/79   Pulse: 90   Resp: 18   Temp: 97.8 °F (36.6 °C)   SpO2: 97%       General: No acute distress   CVS:  Heart sounds S1 S2 +     RS: Normal respiratory effort, Breat sound: diminished at bases.     Abd: bowel sounds +,  distension .   Extremities/MSK:  Edema,        Vascular Access:   Hemodialysis catheter location: Left  Non tunneled internal jugular  . Exit site demonstrates:  normal findings; no signs of bleeding, redness, tenderness or discharge    Permanent Vascular access:  Left  upper extremity AV : Access site demonstrates: normal thrill/bruit; no pseudoaneurysm, redness, pain or discharge          Labs Reviewed  by me   Labs   Lab Results   Component Value Date    CREATININE 7.6 (HH) 2025    BUN 78 (H) 2025     2025    K 6.1 (HH) 2025    CL 94 (L) 2025    CO2 18 (L) 2025     Lab Results   Component Value Date    WBC 10.4 2025    HGB 12.1 2025    HCT 36.7 2025    MCV 90.6 2025     (L) 2025       Dialysis Treatment and Prescription reviewed    RX:  See dialysis flowsheet for specifics on access, blood flow rate, dialysate baths, duration of dialysis, anticoagulation and other technical information.    COMMENTS:  Stable on dialysis.    Continue to Target dry weight and clearance.    Monitor closely for any hypotension    :Tolerating dialysis well, with no complications.  Hypotension on dialysis-stable to improved.  Good flow access.  :Anemia. Stable.Continue Aransep.    :Fluid Overload. Stable    Fluid

## 2025-07-11 NOTE — RT PROTOCOL NOTE
RT Nebulizer Bronchodilator Protocol Note    There is a bronchodilator order in the chart from a provider indicating to follow the RT Bronchodilator Protocol and there is an “Initiate RT Bronchodilator Protocol” order as well (see protocol at bottom of note).    CXR Findings:  XR CHEST PORTABLE  Result Date: 7/11/2025  1. Moderate left-sided pleural effusion with associated parenchymal airspace disease throughout the left lung concerning for multifocal pneumonia. 2. Mild vascular congestion.       The findings from the last RT Protocol Assessment were as follows:  Smoking: Chronic pulmonary disease  Respiratory Pattern: Regular pattern and RR 12-20 bpm  Breath Sounds: Slightly diminished and/or crackles  Cough: Strong, spontaneous, non-productive  Indication for Bronchodilator Therapy: On home bronchodilators, Decreased or absent breath sounds  Bronchodilator Assessment Score: 4    Aerosolized bronchodilator medication orders have been revised according to the RT Nebulizer Bronchodilator Protocol below.    Respiratory Therapist to perform RT Therapy Protocol Assessment initially then follow the protocol.  Repeat RT Therapy Protocol Assessment PRN for score 0-3 or on second treatment, BID, and PRN for scores above 3.    No Indications - adjust the frequency to every 6 hours PRN wheezing or bronchospasm, if no treatments needed after 48 hours then discontinue using Per Protocol order mode.     If indication present, adjust the RT bronchodilator orders based on the Bronchodilator Assessment Score as indicated below.  If a patient is on this medication at home then do not decrease Frequency below that used at home.    0-3 - enter or revise RT bronchodilator order(s) to equivalent RT Bronchodilator order with Frequency of every 4 hours PRN for wheezing or increased work of breathing using Per Protocol order mode.       4-6 - enter or revise RT Bronchodilator order(s) to two equivalent RT bronchodilator orders with one

## 2025-07-12 LAB
ALBUMIN SERPL-MCNC: 3.3 G/DL (ref 3.4–5)
ANION GAP SERPL CALCULATED.3IONS-SCNC: 17 MMOL/L (ref 3–16)
BASOPHILS # BLD: 0.1 K/UL (ref 0–0.2)
BASOPHILS NFR BLD: 0.5 %
BUN SERPL-MCNC: 43 MG/DL (ref 7–20)
CALCIUM SERPL-MCNC: 9 MG/DL (ref 8.3–10.6)
CHLORIDE SERPL-SCNC: 99 MMOL/L (ref 99–110)
CO2 SERPL-SCNC: 21 MMOL/L (ref 21–32)
CREAT SERPL-MCNC: 5.1 MG/DL (ref 0.6–1.1)
DEPRECATED RDW RBC AUTO: 17.4 % (ref 12.4–15.4)
EOSINOPHIL # BLD: 0.1 K/UL (ref 0–0.6)
EOSINOPHIL NFR BLD: 0.7 %
GFR SERPLBLD CREATININE-BSD FMLA CKD-EPI: 10 ML/MIN/{1.73_M2}
GLUCOSE BLD-MCNC: 100 MG/DL (ref 70–99)
GLUCOSE BLD-MCNC: 125 MG/DL (ref 70–99)
GLUCOSE BLD-MCNC: 175 MG/DL (ref 70–99)
GLUCOSE SERPL-MCNC: 152 MG/DL (ref 70–99)
HBV SURFACE AB SERPL IA-ACNC: 339 MIU/ML
HBV SURFACE AG SERPL QL IA: NORMAL
HCT VFR BLD AUTO: 33.1 % (ref 36–48)
HGB BLD-MCNC: 11.2 G/DL (ref 12–16)
LEGIONELLA AG UR QL: NORMAL
LYMPHOCYTES # BLD: 0.8 K/UL (ref 1–5.1)
LYMPHOCYTES NFR BLD: 8.8 %
MAGNESIUM SERPL-MCNC: 1.98 MG/DL (ref 1.8–2.4)
MCH RBC QN AUTO: 30.3 PG (ref 26–34)
MCHC RBC AUTO-ENTMCNC: 33.8 G/DL (ref 31–36)
MCV RBC AUTO: 89.6 FL (ref 80–100)
MONOCYTES # BLD: 0.7 K/UL (ref 0–1.3)
MONOCYTES NFR BLD: 7.2 %
NEUTROPHILS # BLD: 7.9 K/UL (ref 1.7–7.7)
NEUTROPHILS NFR BLD: 82.8 %
PERFORMED ON: ABNORMAL
PHOSPHATE SERPL-MCNC: 6.4 MG/DL (ref 2.5–4.9)
PLATELET # BLD AUTO: 164 K/UL (ref 135–450)
PMV BLD AUTO: 8.2 FL (ref 5–10.5)
POTASSIUM SERPL-SCNC: 4.3 MMOL/L (ref 3.5–5.1)
RBC # BLD AUTO: 3.69 M/UL (ref 4–5.2)
S PNEUM AG UR QL: NORMAL
SODIUM SERPL-SCNC: 137 MMOL/L (ref 136–145)
WBC # BLD AUTO: 9.6 K/UL (ref 4–11)

## 2025-07-12 PROCEDURE — 94761 N-INVAS EAR/PLS OXIMETRY MLT: CPT

## 2025-07-12 PROCEDURE — 2700000000 HC OXYGEN THERAPY PER DAY

## 2025-07-12 PROCEDURE — 51798 US URINE CAPACITY MEASURE: CPT

## 2025-07-12 PROCEDURE — 87340 HEPATITIS B SURFACE AG IA: CPT

## 2025-07-12 PROCEDURE — 80069 RENAL FUNCTION PANEL: CPT

## 2025-07-12 PROCEDURE — 6370000000 HC RX 637 (ALT 250 FOR IP)

## 2025-07-12 PROCEDURE — 2500000003 HC RX 250 WO HCPCS: Performed by: PHYSICIAN ASSISTANT

## 2025-07-12 PROCEDURE — 83735 ASSAY OF MAGNESIUM: CPT

## 2025-07-12 PROCEDURE — 2060000000 HC ICU INTERMEDIATE R&B

## 2025-07-12 PROCEDURE — 85025 COMPLETE CBC W/AUTO DIFF WBC: CPT

## 2025-07-12 PROCEDURE — 86706 HEP B SURFACE ANTIBODY: CPT

## 2025-07-12 PROCEDURE — 36415 COLL VENOUS BLD VENIPUNCTURE: CPT

## 2025-07-12 PROCEDURE — 36556 INSERT NON-TUNNEL CV CATH: CPT

## 2025-07-12 PROCEDURE — 6360000002 HC RX W HCPCS: Performed by: PHYSICIAN ASSISTANT

## 2025-07-12 PROCEDURE — 94640 AIRWAY INHALATION TREATMENT: CPT

## 2025-07-12 PROCEDURE — 2580000003 HC RX 258: Performed by: PHYSICIAN ASSISTANT

## 2025-07-12 PROCEDURE — 6370000000 HC RX 637 (ALT 250 FOR IP): Performed by: PHYSICIAN ASSISTANT

## 2025-07-12 PROCEDURE — 6370000000 HC RX 637 (ALT 250 FOR IP): Performed by: STUDENT IN AN ORGANIZED HEALTH CARE EDUCATION/TRAINING PROGRAM

## 2025-07-12 RX ADMIN — CLONIDINE HYDROCHLORIDE 0.2 MG: 0.1 TABLET ORAL at 17:24

## 2025-07-12 RX ADMIN — HEPARIN SODIUM 5000 UNITS: 5000 INJECTION INTRAVENOUS; SUBCUTANEOUS at 22:57

## 2025-07-12 RX ADMIN — ASPIRIN 81 MG: 81 TABLET, COATED ORAL at 17:24

## 2025-07-12 RX ADMIN — AZITHROMYCIN MONOHYDRATE 500 MG: 500 INJECTION, POWDER, LYOPHILIZED, FOR SOLUTION INTRAVENOUS at 03:04

## 2025-07-12 RX ADMIN — METOPROLOL TARTRATE 100 MG: 50 TABLET, FILM COATED ORAL at 17:24

## 2025-07-12 RX ADMIN — IPRATROPIUM BROMIDE AND ALBUTEROL SULFATE 1 DOSE: .5; 3 SOLUTION RESPIRATORY (INHALATION) at 09:04

## 2025-07-12 RX ADMIN — Medication 1 CAPSULE: at 17:24

## 2025-07-12 RX ADMIN — HYDRALAZINE HYDROCHLORIDE 50 MG: 25 TABLET ORAL at 17:24

## 2025-07-12 RX ADMIN — HEPARIN SODIUM 5000 UNITS: 5000 INJECTION INTRAVENOUS; SUBCUTANEOUS at 06:43

## 2025-07-12 RX ADMIN — HEPARIN SODIUM 5000 UNITS: 5000 INJECTION INTRAVENOUS; SUBCUTANEOUS at 17:25

## 2025-07-12 RX ADMIN — DICLOFENAC SODIUM 2 G: 10 GEL TOPICAL at 20:57

## 2025-07-12 RX ADMIN — PANTOPRAZOLE SODIUM 40 MG: 40 TABLET, DELAYED RELEASE ORAL at 17:24

## 2025-07-12 RX ADMIN — HYDRALAZINE HYDROCHLORIDE 50 MG: 25 TABLET ORAL at 06:43

## 2025-07-12 RX ADMIN — PANTOPRAZOLE SODIUM 40 MG: 40 TABLET, DELAYED RELEASE ORAL at 06:43

## 2025-07-12 RX ADMIN — WATER 1000 MG: 1 INJECTION INTRAMUSCULAR; INTRAVENOUS; SUBCUTANEOUS at 02:56

## 2025-07-12 RX ADMIN — HYDRALAZINE HYDROCHLORIDE 50 MG: 25 TABLET ORAL at 23:49

## 2025-07-12 RX ADMIN — SODIUM CHLORIDE, PRESERVATIVE FREE 10 ML: 5 INJECTION INTRAVENOUS at 20:57

## 2025-07-12 RX ADMIN — BUSPIRONE HYDROCHLORIDE 10 MG: 5 TABLET ORAL at 17:24

## 2025-07-12 RX ADMIN — IPRATROPIUM BROMIDE AND ALBUTEROL SULFATE 1 DOSE: .5; 3 SOLUTION RESPIRATORY (INHALATION) at 20:13

## 2025-07-12 RX ADMIN — SEVELAMER CARBONATE FOR ORAL SUSPENSION 2.4 G: 800 POWDER, FOR SUSPENSION ORAL at 17:35

## 2025-07-12 RX ADMIN — AMLODIPINE BESYLATE 5 MG: 5 TABLET ORAL at 17:24

## 2025-07-12 RX ADMIN — TORSEMIDE 40 MG: 20 TABLET ORAL at 17:24

## 2025-07-12 ASSESSMENT — PAIN SCALES - GENERAL
PAINLEVEL_OUTOF10: 0
PAINLEVEL_OUTOF10: 2
PAINLEVEL_OUTOF10: 0

## 2025-07-12 ASSESSMENT — PAIN DESCRIPTION - DESCRIPTORS: DESCRIPTORS: DISCOMFORT

## 2025-07-12 ASSESSMENT — PAIN DESCRIPTION - LOCATION: LOCATION: BACK

## 2025-07-12 ASSESSMENT — PAIN - FUNCTIONAL ASSESSMENT: PAIN_FUNCTIONAL_ASSESSMENT: ACTIVITIES ARE NOT PREVENTED

## 2025-07-12 ASSESSMENT — PAIN DESCRIPTION - ORIENTATION: ORIENTATION: UPPER

## 2025-07-12 ASSESSMENT — PAIN DESCRIPTION - FREQUENCY: FREQUENCY: INTERMITTENT

## 2025-07-12 ASSESSMENT — PAIN DESCRIPTION - ONSET: ONSET: ON-GOING

## 2025-07-12 ASSESSMENT — PAIN DESCRIPTION - PAIN TYPE: TYPE: CHRONIC PAIN

## 2025-07-13 VITALS
WEIGHT: 142.8 LBS | BODY MASS INDEX: 22.41 KG/M2 | HEART RATE: 81 BPM | HEIGHT: 67 IN | DIASTOLIC BLOOD PRESSURE: 63 MMHG | OXYGEN SATURATION: 97 % | TEMPERATURE: 97.6 F | SYSTOLIC BLOOD PRESSURE: 118 MMHG | RESPIRATION RATE: 16 BRPM

## 2025-07-13 LAB
ALBUMIN SERPL-MCNC: 3.2 G/DL (ref 3.4–5)
ANION GAP SERPL CALCULATED.3IONS-SCNC: 12 MMOL/L (ref 3–16)
BASOPHILS # BLD: 0 K/UL (ref 0–0.2)
BASOPHILS NFR BLD: 0.5 %
BUN SERPL-MCNC: 24 MG/DL (ref 7–20)
CALCIUM SERPL-MCNC: 8.8 MG/DL (ref 8.3–10.6)
CHLORIDE SERPL-SCNC: 102 MMOL/L (ref 99–110)
CO2 SERPL-SCNC: 23 MMOL/L (ref 21–32)
CREAT SERPL-MCNC: 3.4 MG/DL (ref 0.6–1.1)
DEPRECATED RDW RBC AUTO: 17.5 % (ref 12.4–15.4)
EOSINOPHIL # BLD: 0.2 K/UL (ref 0–0.6)
EOSINOPHIL NFR BLD: 2.6 %
GFR SERPLBLD CREATININE-BSD FMLA CKD-EPI: 16 ML/MIN/{1.73_M2}
GLUCOSE BLD-MCNC: 115 MG/DL (ref 70–99)
GLUCOSE BLD-MCNC: 161 MG/DL (ref 70–99)
GLUCOSE SERPL-MCNC: 148 MG/DL (ref 70–99)
HCT VFR BLD AUTO: 32.6 % (ref 36–48)
HGB BLD-MCNC: 10.9 G/DL (ref 12–16)
LYMPHOCYTES # BLD: 0.9 K/UL (ref 1–5.1)
LYMPHOCYTES NFR BLD: 13.8 %
MAGNESIUM SERPL-MCNC: 1.92 MG/DL (ref 1.8–2.4)
MCH RBC QN AUTO: 30 PG (ref 26–34)
MCHC RBC AUTO-ENTMCNC: 33.5 G/DL (ref 31–36)
MCV RBC AUTO: 89.5 FL (ref 80–100)
MONOCYTES # BLD: 0.7 K/UL (ref 0–1.3)
MONOCYTES NFR BLD: 10.8 %
NEUTROPHILS # BLD: 4.6 K/UL (ref 1.7–7.7)
NEUTROPHILS NFR BLD: 72.3 %
PERFORMED ON: ABNORMAL
PERFORMED ON: ABNORMAL
PHOSPHATE SERPL-MCNC: 4 MG/DL (ref 2.5–4.9)
PLATELET # BLD AUTO: 154 K/UL (ref 135–450)
PMV BLD AUTO: 8 FL (ref 5–10.5)
POTASSIUM SERPL-SCNC: 3.6 MMOL/L (ref 3.5–5.1)
RBC # BLD AUTO: 3.64 M/UL (ref 4–5.2)
SODIUM SERPL-SCNC: 137 MMOL/L (ref 136–145)
WBC # BLD AUTO: 6.4 K/UL (ref 4–11)

## 2025-07-13 PROCEDURE — 94761 N-INVAS EAR/PLS OXIMETRY MLT: CPT

## 2025-07-13 PROCEDURE — 2580000003 HC RX 258: Performed by: PHYSICIAN ASSISTANT

## 2025-07-13 PROCEDURE — 85025 COMPLETE CBC W/AUTO DIFF WBC: CPT

## 2025-07-13 PROCEDURE — 97166 OT EVAL MOD COMPLEX 45 MIN: CPT

## 2025-07-13 PROCEDURE — 80069 RENAL FUNCTION PANEL: CPT

## 2025-07-13 PROCEDURE — 97535 SELF CARE MNGMENT TRAINING: CPT

## 2025-07-13 PROCEDURE — 6370000000 HC RX 637 (ALT 250 FOR IP): Performed by: PHYSICIAN ASSISTANT

## 2025-07-13 PROCEDURE — 0JH63XZ INSERTION OF TUNNELED VASCULAR ACCESS DEVICE INTO CHEST SUBCUTANEOUS TISSUE AND FASCIA, PERCUTANEOUS APPROACH: ICD-10-PCS | Performed by: INTERNAL MEDICINE

## 2025-07-13 PROCEDURE — 2700000000 HC OXYGEN THERAPY PER DAY

## 2025-07-13 PROCEDURE — 83735 ASSAY OF MAGNESIUM: CPT

## 2025-07-13 PROCEDURE — 36415 COLL VENOUS BLD VENIPUNCTURE: CPT

## 2025-07-13 PROCEDURE — 97161 PT EVAL LOW COMPLEX 20 MIN: CPT

## 2025-07-13 PROCEDURE — 2500000003 HC RX 250 WO HCPCS: Performed by: PHYSICIAN ASSISTANT

## 2025-07-13 PROCEDURE — 6370000000 HC RX 637 (ALT 250 FOR IP): Performed by: STUDENT IN AN ORGANIZED HEALTH CARE EDUCATION/TRAINING PROGRAM

## 2025-07-13 PROCEDURE — 97530 THERAPEUTIC ACTIVITIES: CPT

## 2025-07-13 PROCEDURE — 94640 AIRWAY INHALATION TREATMENT: CPT

## 2025-07-13 PROCEDURE — 6360000002 HC RX W HCPCS: Performed by: PHYSICIAN ASSISTANT

## 2025-07-13 RX ORDER — METRONIDAZOLE 500 MG/1
500 TABLET ORAL 3 TIMES DAILY
Qty: 12 TABLET | Refills: 0 | Status: SHIPPED | OUTPATIENT
Start: 2025-07-14 | End: 2025-07-18

## 2025-07-13 RX ORDER — LACTOBACILLUS RHAMNOSUS GG 10B CELL
1 CAPSULE ORAL 2 TIMES DAILY WITH MEALS
Qty: 6 CAPSULE | Refills: 0 | Status: SHIPPED | OUTPATIENT
Start: 2025-07-13 | End: 2025-07-18

## 2025-07-13 RX ADMIN — Medication 1 CAPSULE: at 08:58

## 2025-07-13 RX ADMIN — AZITHROMYCIN MONOHYDRATE 500 MG: 500 INJECTION, POWDER, LYOPHILIZED, FOR SOLUTION INTRAVENOUS at 01:41

## 2025-07-13 RX ADMIN — WATER 1000 MG: 1 INJECTION INTRAMUSCULAR; INTRAVENOUS; SUBCUTANEOUS at 01:34

## 2025-07-13 RX ADMIN — HYDRALAZINE HYDROCHLORIDE 50 MG: 25 TABLET ORAL at 05:09

## 2025-07-13 RX ADMIN — METOPROLOL TARTRATE 100 MG: 50 TABLET, FILM COATED ORAL at 08:58

## 2025-07-13 RX ADMIN — HEPARIN SODIUM 5000 UNITS: 5000 INJECTION INTRAVENOUS; SUBCUTANEOUS at 05:11

## 2025-07-13 RX ADMIN — CLONIDINE HYDROCHLORIDE 0.2 MG: 0.1 TABLET ORAL at 08:58

## 2025-07-13 RX ADMIN — TORSEMIDE 40 MG: 20 TABLET ORAL at 08:58

## 2025-07-13 RX ADMIN — SEVELAMER CARBONATE FOR ORAL SUSPENSION 2.4 G: 800 POWDER, FOR SUSPENSION ORAL at 12:33

## 2025-07-13 RX ADMIN — AMLODIPINE BESYLATE 5 MG: 5 TABLET ORAL at 08:58

## 2025-07-13 RX ADMIN — SODIUM CHLORIDE, PRESERVATIVE FREE 10 ML: 5 INJECTION INTRAVENOUS at 08:59

## 2025-07-13 RX ADMIN — DICLOFENAC SODIUM 2 G: 10 GEL TOPICAL at 08:59

## 2025-07-13 RX ADMIN — VENLAFAXINE HYDROCHLORIDE 37.5 MG: 37.5 CAPSULE, EXTENDED RELEASE ORAL at 08:58

## 2025-07-13 RX ADMIN — PANTOPRAZOLE SODIUM 40 MG: 40 TABLET, DELAYED RELEASE ORAL at 05:09

## 2025-07-13 RX ADMIN — BUSPIRONE HYDROCHLORIDE 10 MG: 5 TABLET ORAL at 08:58

## 2025-07-13 RX ADMIN — SEVELAMER CARBONATE FOR ORAL SUSPENSION 2.4 G: 800 POWDER, FOR SUSPENSION ORAL at 09:03

## 2025-07-13 RX ADMIN — IPRATROPIUM BROMIDE AND ALBUTEROL SULFATE 1 DOSE: .5; 3 SOLUTION RESPIRATORY (INHALATION) at 07:46

## 2025-07-13 RX ADMIN — ASPIRIN 81 MG: 81 TABLET, COATED ORAL at 08:58

## 2025-07-13 NOTE — DISCHARGE INSTR - COC
DME ADLs:135541211}  Toileting  {P DME ADLs:252993085}  Feeding  {P DME ADLs:274447310}  Med Admin  {[facility] DME ADLs:336849973}  Med Delivery   { SARAH MED Delivery:325138521}    Wound Care Documentation and Therapy:  Wound 25 Toe (Comment  which one) Left;Anterior wound on inner second toe (Active)   Number of days: 116        Elimination:  Continence:   Bowel: {YES / NO:}  Bladder: {YES / NO:}  Urinary Catheter: {Urinary Catheter:937810947}   Colostomy/Ileostomy/Ileal Conduit: {YES / NO:}       Date of Last BM: ***  No intake or output data in the 24 hours ending 25 1241  I/O last 3 completed shifts:  In: 20 [P.O.:20]  Out: -     Safety Concerns:     { SARAH Safety Concerns:601881338}    Impairments/Disabilities:      {[facility] Impairments/Disabilities:344315999}    Nutrition Therapy:  Current Nutrition Therapy:   { ASRAH Diet List:504664894}    Routes of Feeding: {[facility] DME Other Feedings:269807755}  Liquids: {Slp liquid thickness:44786}  Daily Fluid Restriction: {[facility] DME Yes amt example:047878586}  Last Modified Barium Swallow with Video (Video Swallowing Test): {Done Not Done Date:}    Treatments at the Time of Hospital Discharge:   Respiratory Treatments: ***  Oxygen Therapy:  {Therapy; copd oxygen:43064}  Ventilator:    {[facility] Vent List:654691525}    Rehab Therapies: {THERAPEUTIC INTERVENTION:1979547951}  Weight Bearing Status/Restrictions: {[facility] Weight Bearin}  Other Medical Equipment (for information only, NOT a DME order):  {EQUIPMENT:752923778}  Other Treatments: ***    Patient's personal belongings (please select all that are sent with patient):  {[facility] DME Belongings:510709494}    RN SIGNATURE:  {Esignature:427178916}    CASE MANAGEMENT/SOCIAL WORK SECTION    Inpatient Status Date: ***    Readmission Risk Assessment Score:  [facility] RISK OF UNPLANNED READMISSION 2.0             33.3 Total Score        Discharging to Facility/ Agency   Name:

## 2025-07-13 NOTE — PLAN OF CARE
Problem: Chronic Conditions and Co-morbidities  Goal: Patient's chronic conditions and co-morbidity symptoms are monitored and maintained or improved  Outcome: Progressing  Flowsheets (Taken 7/12/2025 2057)  Care Plan - Patient's Chronic Conditions and Co-Morbidity Symptoms are Monitored and Maintained or Improved: Monitor and assess patient's chronic conditions and comorbid symptoms for stability, deterioration, or improvement     Problem: Discharge Planning  Goal: Discharge to home or other facility with appropriate resources  Outcome: Progressing  Flowsheets (Taken 7/12/2025 2057)  Discharge to home or other facility with appropriate resources: Identify barriers to discharge with patient and caregiver     Problem: Pain  Goal: Verbalizes/displays adequate comfort level or baseline comfort level  Outcome: Progressing     Problem: Coping  Goal: Pt/Family able to verbalize concerns and demonstrate effective coping strategies  Description: INTERVENTIONS:  1. Assist patient/family to identify coping skills, available support systems and cultural and spiritual values  2. Provide emotional support, including active listening and acknowledgement of concerns of patient and caregivers  3. Reduce environmental stimuli, as able  4. Instruct patient/family in relaxation techniques, as appropriate  5. Assess for spiritual pain/suffering and initiate Spiritual Care, Psychosocial Clinical Specialist consults as needed  Outcome: Progressing  Flowsheets (Taken 7/12/2025 2057)  Patient/family able to verbalize anxieties, fears, and concerns, and demonstrate effective coping: Assist patient/family to identify coping skills, available support systems and cultural and spiritual values     Problem: Neurosensory - Adult  Goal: Achieves stable or improved neurological status  Outcome: Progressing  Flowsheets (Taken 7/12/2025 2057)  Achieves stable or improved neurological status: Assess for and report changes in neurological status

## 2025-07-13 NOTE — CARE COORDINATION
DISCHARGE ORDER  Date/Time 2025 1:17 PM  Completed by: Teresa Boeck, RN, Case Management    Patient Name: Kristine Ramirez      : 1975  Admitting Diagnosis: Hyperkalemia [E87.5]  Lactic acidosis [E87.20]  Malaise [R53.81]  Noncompliance with renal dialysis [Z91.158]  Recurrent left pleural effusion [J90]  Hypervolemia, unspecified hypervolemia type [E87.70]  Pneumonia due to infectious organism, unspecified laterality, unspecified part of lung [J18.9]      Admit order Date and Status: 2025  (verify MD's last order for status of admission)      Noted discharge order.   If applicable PT/OT recommendation at Discharge: Home OTPT  DME recommendation by PT/OT:  Confirmed discharge plan: Yes  with whom the patient  If pt confirmed DC plan does family need to be contacted by CM No if yes who______  Discharge Plan: The patient is discharging to home with spouse. The patient is declining home therapy. Denies needs at this time.     Date of Last IMM Given: 2025    Reviewed chart.  Role of discharge planner explained and patient verbalized understanding. Discharge order is noted.    Has Home O2 in place on admit:  No  Informed of need to bring portable home O2 tank on day of discharge for nursing to connect prior to leaving:   Not Indicated  Verbalized agreement/Understanding:   Not Indicated  Pt is being d/c'd to home today. Pt's O2 sats are 97% on RA.    Discharge timeout done with Page LEACH. All discharge needs and concerns addressed.

## 2025-07-13 NOTE — DISCHARGE INSTRUCTIONS
Please call and make an appointment with your PCP within 1 week; If you do not have a PCP please make an appointment with the [facility] outpatient resident clinic by calling [phone]  Please call and make an appointment with Nephrology  Please take all your medications as prescribed including any new ones on discharge

## 2025-07-13 NOTE — DISCHARGE SUMMARY
Data- discharge order received, pt verbalized agreement to discharge, disposition to previous residence, patient refused HHC.    Action- discharge instructions prepared and given to patient, pt verbalized understanding. Medication information packet given r/t NEW and/or CHANGED prescriptions emphasizing name/purpose/side effects, pt verbalized understanding. Discharge instruction summary: Diet- regular, 4 carb, low potassium, Activity- no restrictions, Primary Care Physician as follows: Damon Mireles -[phone] f/u appointment, patient to call and set up f/u appointment, prescription medications filled metronidazole, lactobacillus, and Voltaren gel at [facility] (on [address]).  1. WEIGHT: Admit Weight - Scale: 63 kg (139 lb) (07/10/25 2157)        Today  Weight - Scale: 64.8 kg (142 lb 12.8 oz) (07/13/25 0738)       2. O2 SAT.: SpO2: 97 % (07/13/25 1145)    Response- Pt belongings gathered, IV removed. Disposition is home, refused HHC, transported with lyft, taken to lobby via w/c w/ all personal belongings, no complications, where she left in a hospital chartered lyisabela back to her home residence, her  is aware and waiting for her to her get nathan the house when she gets home.

## 2025-07-13 NOTE — PROGRESS NOTES
[facility] Note    Patient Active Problem List   Diagnosis    Type 2 diabetes mellitus with hyperlipidemia (HCC)    Mixed hyperlipidemia    Migraine headache    Anemia    Diabetic foot infection (HCC)    Pyogenic inflammation of bone (HCC)    History of medication noncompliance    Osteomyelitis of left foot (HCC)    Nephrotic syndrome    Peripheral edema    Pulmonary edema    Right sided numbness    Tobacco dependence    H/O: CVA (cerebrovascular accident)    HTN (hypertension), benign    DM (diabetes mellitus), secondary, uncontrolled, 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, associated with type 2 diabetes mellitus (HCC)    Cellulitis of right foot    Hidradenitis suppurativa    Hypocalcemia    Non-toxic multinodular goiter    Polyneuropathy due to type 2 diabetes mellitus (HCC)    Proliferative diabetic retinopathy associated with type 2 diabetes mellitus (HCC)    Epiglottitis    Recurrent falls    Hypotension    Leukocytosis    Volume overload    Hemodialysis catheter dysfunction    Hypoproteinemia    GABRIELA (obstructive sleep apnea)    Type 2 diabetes mellitus with obesity (HCC)    Wheelchair dependence    ESRD on dialysis (HCC)    Hyperkalemia    Suspected COVID-19 virus infection    Dependent on walker for ambulation    Medication management contract agreement - signed on 5/18/2023    Controlled drug dependence (HCC)    Generalized anxiety disorder with panic attacks    Coagulation defect    Chronic obstructive pulmonary disease, unspecified (HCC)    Abdominal distention    Encounter for assessment of ascites    Other ascites    Physical deconditioning    AMS (altered mental status)    Primary hypertension    Cardiomyopathy, unspecified type (HCC)    Pneumonia, bacterial    
      Admit Date: 7/10/2025  Diet: ADULT DIET; Regular; 4 carb choices (60 gm/meal); Low Potassium (Less than 3000 mg/day)    CC: Hyperkalemia    Interval history:   Overnight, there were no acute events. Patient's vitals remained stable    Patient was seen this morning. I discussed the plan of the day with her in detail. All questions were addressed and answered to patient's satisfaction.   Patient denies fevers, chills, nausea, vomiting, chest pain, shortness of breath, diarrhea, constipation, dysuria, urinary frequency or urgency.     Plan:     - HyperK improving after HD  - HD today  - Continue IV azithromycin + Rocephin  - Probiotics  - PT/OT    Assessment:   Kristine Ramirez is a 50 y.o. female with PMH of ESRD on HD, diastolic heart failure, T2DM, HTN who was admitted with hyperkalemia    Hyperkalemia. ESRD on HD  Pt presented with complaints of, \"generally feeling unwell. She reports symptoms have worsened over the last couple days. She has ESRD on HD but reports she has not been to dialysis for 6 days.\" On admission, K was 6.5. She is s/p albuterol, calcium gluconate 1g, Lasix 40 Mg IV, 10 units regular insulin IV, 250 mL bolus D10, and Lokelma 10g administered in ED  - Nephrology consulted     Pneumonia  On admission, CXR concerning for multifocal pneumonia     Chronic diastolic CHF  At home, torsemide, metoprolol, hydralazine     DM2  At home, patient is on Trulicity      HTN  At home, patient is on norvasc, clonidine, hydralazine, metoprolol, torsemide    Code Status: Full Code   FEN: ADULT DIET; Regular; 4 carb choices (60 gm/meal); Low Potassium (Less than 3000 mg/day)   DVT PPX: [x]Lovenox, []Heparin, []Coumadin, []Eliquis, []Xarelto, []SCD  DISPO Pending: HD sessions    Jeffy Barriga MD  7/12/2025,  12:53 PM    This note was likely completed using voice recognition technology and may contain unintended errors.     Objective:   Vitals:   T-max:  Patient Vitals for the past 8 hrs:   BP Temp Temp 
      Admit Date: 7/10/2025  Diet: ADULT DIET; Regular; 4 carb choices (60 gm/meal); Low Potassium (Less than 3000 mg/day)    CC: Hyperkalemia    Interval history:   Overnight, there were no acute events. Patient's vitals remained stable    Patient was seen this morning. I discussed the plan of the day with her in detail. All questions were addressed and answered to patient's satisfaction.   Patient denies fevers, chills, nausea, vomiting, chest pain, shortness of breath, diarrhea, constipation, dysuria, urinary frequency or urgency.     Plan:     - Monitor K closely and treat hyperkalemia aggressively, Low K diet  - HD sessions per nephrology as patient has missed 6 days of dialysis  - Continue IV azithromycin + Rocephin  - Probiotics  - PT/OT  - Will await for any further recommendations from nephrology    Assessment:   Kristine Ramirez is a 50 y.o. female with PMH of ESRD on HD, diastolic heart failure, T2DM, HTN who was admitted with hyperkalemia    Hyperkalemia. ESRD on HD  Pt presented with complaints of, \"generally feeling unwell. She reports symptoms have worsened over the last couple days. She has ESRD on HD but reports she has not been to dialysis for 6 days.\" On admission, K was 6.5. She is s/p albuterol, calcium gluconate 1g, Lasix 40 Mg IV, 10 units regular insulin IV, 250 mL bolus D10, and Lokelma 10g administered in ED  - Nephrology consulted     Pneumonia  On admission, CXR concerning for multifocal pneumonia     Chronic diastolic CHF  At home, torsemide, metoprolol, hydralazine     DM2  At home, patient is on Trulicity      HTN  At home, patient is on norvasc, clonidine, hydralazine, metoprolol, torsemide    Code Status: Full Code   FEN: ADULT DIET; Regular; 4 carb choices (60 gm/meal); Low Potassium (Less than 3000 mg/day)   DVT PPX: [x]Lovenox, []Heparin, []Coumadin, []Eliquis, []Xarelto, []SCD  DISPO Pending: HD sessions, nephro nino Barriga MD  7/11/2025,  4:44 PM    This note was 
      Admit Date: 7/10/2025  Diet: ADULT DIET; Regular; 4 carb choices (60 gm/meal); Low Potassium (Less than 3000 mg/day)    CC: Hyperkalemia    Interval history:   Overnight, there were no acute events. Patient's vitals remained stable    Patient was seen this morning. I discussed the plan of the day with her in detail. All questions were addressed and answered to patient's satisfaction.   Patient denies fevers, chills, nausea, vomiting, chest pain, shortness of breath, diarrhea, constipation, dysuria, urinary frequency or urgency.     Plan:     - Pt is discharged on p.o Abx for her PNA    Assessment:   Kristine Ramirez is a 50 y.o. female with PMH of ESRD on HD, diastolic heart failure, T2DM, HTN who was admitted with hyperkalemia    Hyperkalemia. ESRD on HD  Pt presented with complaints of, \"generally feeling unwell. She reports symptoms have worsened over the last couple days. She has ESRD on HD but reports she has not been to dialysis for 6 days.\" On admission, K was 6.5. She is s/p albuterol, calcium gluconate 1g, Lasix 40 Mg IV, 10 units regular insulin IV, 250 mL bolus D10, and Lokelma 10g administered in ED  - Nephrology consulted     Pneumonia  On admission, CXR concerning for multifocal pneumonia     Chronic diastolic CHF  At home, torsemide, metoprolol, hydralazine     DM2  At home, patient is on Trulicity      HTN  At home, patient is on norvasc, clonidine, hydralazine, metoprolol, torsemide    Code Status: Full Code   FEN: ADULT DIET; Regular; 4 carb choices (60 gm/meal); Low Potassium (Less than 3000 mg/day)   DVT PPX: [x]Lovenox, []Heparin, []Coumadin, []Eliquis, []Xarelto, []SCD  DISPO Pending: D/C today    Jeffy Barriga MD  7/13/2025,  12:50 PM    This note was likely completed using voice recognition technology and may contain unintended errors.     Objective:   Vitals:   T-max:  Patient Vitals for the past 8 hrs:   BP Temp Temp src Pulse Resp SpO2 Weight   07/13/25 1145 118/63 97.6 °F (36.4 
    Internal Medicine Resident  Progress Note    Name:  Kristine Ramirez    /Age/Sex: 1975  (50 y.o. female)  MRN & CSN:  [id number] & [id number]    PCP: Damon Mireles MD    Date of Admission: 7/10/2025    Patient Status:  Inpatient     Chief Complaint:   Chief Complaint   Patient presents with    Illness     Pt presents to ed via [facility], with several complaints including general illness, shoulder pain, fatigue, lack of appetite, and concern for medication reaction to metoprolol and/or spironalactone. PT is a dialysis patient and hasn't been since last Friday.        Hospital Course:   50-year-old female with PMHx of ESRD on HD, diastolic CHF, T2DM, HTN presented with multiple complaints including general illness, bilateral shoulder pain, fatigue, lack of appetite, and upper back pain. Patient usually attends dialysis M/W/F, however last dialysis session was on 2025. Patient stated she has not attended dialysis because she has been constipated, last bowel movement was 2 days ago. She had a fistula on the right upper arm which has closed, currently patient has a left nontunneled internal jugular hemodialysis catheter for vascular access. Patient associates this with her recent bilateral shoulder and upper back pain. Patient has a smoking history of half a pack since the age of 14 years, 7 pack years. Patient denied any alcohol consumption or illicit drug use. Patient stated since her stroke in , she has had no vision from her left eye, she has trouble recognizing colors from her right eye. Patient has a delayed thought and speech. CXR 7/10/2025: Moderate left-sided pleural effusion with associated parenchymal airspace disease, concerning for multifocal pneumonia; mild vascular congestion. In the ED the patient was started on Ceftriaxone 1000 mg, azithromycin 500 mg, Albuterol 5 mg, calcium gluconate 1 g, Lasix 40 mg IV, 10 units regular insulin IV, 250 mL bolus D10, Lokelma 10 
    Internal Medicine Resident  Progress Note    Name:  Kristine Ramirez    /Age/Sex: 1975  (50 y.o. female)  MRN & CSN:  [id number] & [id number]    PCP: Damon Mireles MD    Date of Admission: 7/10/2025    Patient Status:  Inpatient     Chief Complaint:   Chief Complaint   Patient presents with    Illness     Pt presents to ed via [facility], with several complaints including general illness, shoulder pain, fatigue, lack of appetite, and concern for medication reaction to metoprolol and/or spironalactone. PT is a dialysis patient and hasn't been since last Friday.      Hospital Course:   50-year-old female with PMHx of ESRD on HD, diastolic CHF, T2DM, HTN presented with multiple complaints including general illness, bilateral shoulder pain, fatigue, lack of appetite, and upper back pain. Patient usually attends dialysis M/W/F, however last dialysis session was on 2025. Patient stated she has not attended dialysis because she has been constipated, last bowel movement was 2 days ago. She had a fistula on the right upper arm which has closed, currently patient has a left nontunneled internal jugular hemodialysis catheter for vascular access. Patient associates this with her recent bilateral shoulder and upper back pain. Patient has a smoking history of half a pack since the age of 14 years, 7 pack years. Patient denied any alcohol consumption or illicit drug use. Patient stated since her stroke in , she has had no vision from her left eye, she has trouble recognizing colors from her right eye. Patient has a delayed thought and speech. CXR 7/10/2025: Moderate left-sided pleural effusion with associated parenchymal airspace disease, concerning for multifocal pneumonia; mild vascular congestion. In the ED the patient was started on Ceftriaxone 1000 mg, azithromycin 500 mg, Albuterol 5 mg, calcium gluconate 1 g, Lasix 40 mg IV, 10 units regular insulin IV, 250 mL bolus D10, Lokelma 10 g. 
   07/11/25 0626   RT Protocol   History Pulmonary Disease 2   Respiratory pattern 0   Breath sounds 2   Cough 0   Indications for Bronchodilator Therapy On home bronchodilators;Decreased or absent breath sounds   Bronchodilator Assessment Score 4       
   07/11/25 0912   Incentive Spirometry Tx   Predicted Volume 616   Maximum Achieved Volume (mL) 500 mL   Number of Breaths 5       
  MD Isaac Jeong MD Aldo Estella, DO                Office: [phone]                Fax: [phone]            [employer]                      NEPHROLOGY  INPATIENT PROGRESS  NOTE:     PATIENT NAME: Kristine Ramirez  : 1975  MRN: [id number]      Assessment/Plan:    1. ESRD on iHD: MWF.   - outpt HD center: [facility] with   - Access: Hemodialysis-AV Fistula-Standard, Right Upper Arm, Other/Unknown  Access Placed on 2022      -last dialysis outpt: 2025, has missed 6 days  - HD done Saturday and Friday  - Next HD Monday    --for better solutes and volume control  Saturating better    Renal improving for discharge  - Encouraged compliance due to potentially life-threatening issue         2. Hypertension/Volume Status:  - BP hypertension, better controlled today-chronic hyponatremia-better  - EDW 63.5 kg : Below dry weight.  Fluid removal as tolerated.       3. Electrolytes/acid-base:  K: Hyperkalemia, due to missing dialysis follow-up with low K bath, low K diet.  Better       High AG metabolic acidosis: Not controlled, with lactic acidosis  Follow-up with dialysis.  Better     4. Anemia of renal failure: at goal  - Iron: or - RIA: not needed      5. BMD:  - Phos, calcium -calcium WNL.  Phosphorus elevated.  Continue sevelamer.  - follow iPTH outpt        Other hx---  4.  History of pericardial effusion  5.  History of CVA  6.  History of systolic CHF-volume removal as tolerated with HD      Medical decision making- high complexity. Multiple complex health problems.   Discussed with patient and treatment team, Jeffy Barriga MD    Thank you for allowing me to participate in this patient's care. Please do not hesitate to contact me for any questions/concerns. We will follow along with you.     Isaac Coon MD  [facility]   Phone: [phone] or [facility] [employer]  Fax: [phone]    Severally ill, at risk of 
  [facility] - Inpatient Rehabilitation Department   Phone: [phone]    Occupational Therapy    [x] Initial Evaluation            [] Daily Treatment Note         [] Discharge Summary      Patient: Kristine Ramirez   : 1975   MRN: [id number]   Date of Service:  2025    Admitting Diagnosis:  Hyperkalemia  Current Admission Summary: Kristine Ramirez is a 50 y.o. female who presented to ED for evaluation of generally feeling unwell. She reports symptoms have worsened over the last couple days. She has ESRD on HD but reports she has not been to dialysis for 6 days. She denies chest pain, shortness of breath, cough, GI symptoms. She denies any specific complaints or concerns. Workup initiated in ED. Potassium elevated at 6.5. Patient was given albuterol, calcium gluconate 1g, Lasix 40 Mg IV, 10 units regular insulin IV, 250 mL bolus D10, and Lokelma 10g. CXR notable for moderate left-sided pleural effusion with associated airspace disease concerning for multifocal pneumonia; mild vascular congestion noted as well.   Past Medical History:  has a past medical history of Acute respiratory failure due to COVID-19 ([facility]), Arterial ischemic stroke, ICA, left, acute ([facility]), Blind in both eyes, Cerebral artery occlusion with cerebral infarction ([facility]), CHF (congestive heart failure) ([facility]), Chronic kidney disease, COPD (chronic obstructive pulmonary disease) ([facility]), Depression, Diabetes mellitus out of control, Diabetes mellitus, type II ([facility]), Diabetic neuropathy associated with type 2 diabetes mellitus ([facility]), Generalized headaches, Hypertension, Infertility, Insomnia, Migraine headache, Mixed hyperlipidemia, Otitis media, Pelvic abscess in female, Pneumonia, Stroke ([facility]), and Stroke ([facility]).  Past Surgical History:  has a past surgical history that includes Cervix surgery; eye surgery; Foot surgery (Right); Foot surgery (Bilateral); Foot surgery (Left); IR TUNNELED CVC PLACE WO SQ PORT/PUMP > 5 
Incentive Spirometry education and demonstration completed by Respiratory Therapy Yes      Response to education: Excellent     Teaching Time: 20 minutes    Minimum Predicted Vital Capacity - 616 mL.  Patient's Actual Vital Capacity - 1000 mL. Turning over to Nursing for routine follow-up Yes.    Comments: IS goal met    Electronically signed by Corbin Payne RCP on 7/12/2025 at 9:57 AM    
Shift assessment completed. Routine vitals stable. Scheduled medications given. Patient is awake, alert and oriented. Respirations are easy and unlabored. Patient does not appear to be in distress, resting comfortably at this time. Call light within reach.  Noted right foot great toe with dry blood, patient refused to let this nurse clean at this time.     
Treatment time: 3 Hours and 30 minutes    Net UF: 2 liters    Pre weight: 61.9 kg  Post weight: 59.9 kg  EDW: TBD    Access used: Left CVC  Access function: Access site located on the left chest wall had clean dry and intact CVC dressing. No signs of infection noted. No redness, hematoma nor swelling was observed. No discharges was seen. Both ports had good flow. Cleaned site and changed dressing aseptically.    Medications or blood products given: heparin dwell    Regular outpatient schedule: Tuesday,Thursday, Saturday    Summary of response to treatment: 12:05: Treatment set at 2 liters for 3 hours and 30 minutes via Left CVC. Access site located on the left chest wall had clean dry and intact CVC dressing. No signs of infection noted. No redness, hematoma nor swelling was observed. No discharges was seen. Both ports had good flow. Cleaned site and changed dressing aseptically. Parameters set accordingly. Hooked to HD machine  monitored from time to time.1535: Treatment completed. Returned blood aseptically. HD tolerated well.     Copy of dialysis treatment record placed in chart, to be scanned into EMR.  
Treatment time: 3.5 hours     Net UF: 2 L     Pre weight: 63.9 kg  Post weight: 61.9 kg  EDW: 64 from clinic, will need adjusted when discharged      Access used: Left chest TDC   Access function: tolerated 350 BFR     Medications or blood products given: Heparin dwells     [place]     Summary of response to treatment: No issues with HD tolerated well. BP stable, higher.     Copy of dialysis treatment record placed in chart, to be scanned into EMR.              Test Ordered: Hemodialysis inpatient [DIA12]   Code: DIA12   ORD #: [id number]  Associated Diagnosis:   Blood flow rate (BFR): 350  K+: 1K for 1 hr then 2K for rest, (if Saturday potassium above 6, then do same , otherwise use sliding scale potassium on Saturday) **  Ca++: 2.5  Bicarb: 30  Na+: 140  Dialyzer: F180  Dialysate Temperature (C): 36  Dialysate flow rate (DFR): 600  Treatment Time: 3:30 hours  Fluid removal (L): 1-2L as tolerated  Access: Fistula Priority  Routine Class  Hospital Performed      
Assessment: Pt presenting below her baseline independent functional level. Pt requiring min A to stand and maintain her balance. Pt was unable to ambulated due to LE weakness and posterior LOB.  Continued skilled PT services to promote improvements in her function.  Pt reports her spouse can physically assist at home. Pt also states she doesn't like people coming into her home so will likely decline HHPT services.   Safety Interventions: patient left in chair, chair alarm in place, call light within reach, and nurse notified    Plan  Frequency: 3-5 x/per week  Current Treatment Recommendations: strengthening, balance training, functional mobility training, transfer training, gait training, and endurance training    Goals  Patient Goals: to return home    Short Term Goals:  Time Frame: by dc  Patient will complete bed mobility at modified independent   Patient will complete transfers at supervision   Patient will ambulate 25 ft with use of rolling walker at supervision    Above goals reviewed on 7/13/2025.  All goals are ongoing at this time unless indicated above.      Therapy Session Time      Individual Group Co-treatment   Time In     0842   Time Out     0922   Minutes     40     Timed Code Treatment Minutes:  25 Minutes  Total Treatment Minutes:  40       Electronically Signed By: Michelle Phillips, PT            
gm/meal); Low Potassium (Less than 3000 mg/day)  Code Status: Full Code    PT/OT Eval Status: Placed and following    Disposition:  Pending further evaluation        Gemma Mac MD  PGY- 1 Internal Medicine Resident  7/12/2025  7:41 AM

## 2025-07-14 ENCOUNTER — TELEPHONE (OUTPATIENT)
Dept: FAMILY MEDICINE CLINIC | Age: 50
End: 2025-07-14

## 2025-07-14 LAB
BACTERIA BLD CULT ORG #2: NORMAL
BACTERIA BLD CULT: NORMAL

## 2025-07-14 NOTE — TELEPHONE ENCOUNTER
Care Transitions Initial Follow Up Call    Outreach made within 2 business days of discharge: Yes    Patient: Kristine Ramirez Patient : 1975   MRN: [id number]  Reason for Admission: pleural effusion   Discharge Date: 25       Spoke with: unable to call out    Discharge department/facility: [facility]        Scheduled appointment with PCP within 7-14 days    Follow Up  Future Appointments   Date Time Provider Department Center   2025  9:45 AM Radha Brennan MD FF BRST SURG [facility]   2025 11:00 AM Damon Mireles MD [facility] MED BS ECC DEP       Alvina Hui LPN

## 2025-07-14 NOTE — DISCHARGE SUMMARY
V2.0  Discharge Summary    Name:  Kristine Ramirez /Age/Sex: 1975 (50 y.o. female)   Admit Date: 7/10/2025  Discharging Provider: Jeffy Barriga MD   MRN & CSN:  [id number] & [id number] Attending:  No att. providers found       Brief HPI: Kristine Ramirez is a 50 y.o. female with PMHx of ESRD on HD, diastolic heart failure, T2DM, HTN who was admitted with hyperkalemia.  Patient had missed 1 week of dialysis.  On admission, she had hyperkalemia which was treated.  Patient also had HD with improvement of her electrolyte derangements.  Patient also had pneumonia on admission was treated with IV antibiotics.  She was discharged on p.o. antibiotics.  Patient will follow-up with PCP and nephrology in the outpatient setting.    Brief Problem Focused Hospital Course:     Hyperkalemia. ESRD on HD  Pt presented with complaints of, \"generally feeling unwell. She reports symptoms have worsened over the last couple days. She has ESRD on HD but reports she has not been to dialysis for 6 days.\" On admission, K was 6.5. She is s/p albuterol, calcium gluconate 1g, Lasix 40 Mg IV, 10 units regular insulin IV, 250 mL bolus D10, and Lokelma 10g administered in ED  - Nephrology consulted     Pneumonia  On admission, CXR concerning for multifocal pneumonia     Chronic diastolic CHF  At home, torsemide, metoprolol, hydralazine     DM2  At home, patient is on Trulicity      HTN  At home, patient is on norvasc, clonidine, hydralazine, metoprolol, torsemide    The patient expressed appropriate understanding of, and agreement with the discharge recommendations, medications, and plan.     Consults this admission:  IP CONSULT TO HOSPITALIST  IP CONSULT TO NEPHROLOGY    Discharge Instruction:   Primary care physician: Damon Mirlees MD within 2 weeks  Disposition: Discharged to: []Home, [x]HHC, []SNF, []Acute Rehab, []Hospice []AMA  Condition on discharge: Stable    Physical Exam:   General appearance:  No apparent

## 2025-07-16 ENCOUNTER — HOSPITAL ENCOUNTER (OUTPATIENT)
Age: 50
Setting detail: OBSERVATION
Discharge: HOME | End: 2025-07-18
Attending: EMERGENCY MEDICINE | Admitting: STUDENT IN AN ORGANIZED HEALTH CARE EDUCATION/TRAINING PROGRAM
Payer: COMMERCIAL

## 2025-07-16 ENCOUNTER — APPOINTMENT (OUTPATIENT)
Dept: GENERAL RADIOLOGY | Age: 50
End: 2025-07-16
Payer: COMMERCIAL

## 2025-07-16 DIAGNOSIS — R53.1 GENERALIZED WEAKNESS: Primary | ICD-10-CM

## 2025-07-16 LAB
ALBUMIN SERPL-MCNC: 3.7 G/DL (ref 3.4–5)
ALBUMIN/GLOB SERPL: 1.2 {RATIO} (ref 1.1–2.2)
ALP SERPL-CCNC: 137 U/L (ref 40–129)
ALT SERPL-CCNC: 114 U/L (ref 10–40)
ANION GAP SERPL CALCULATED.3IONS-SCNC: 18 MMOL/L (ref 3–16)
AST SERPL-CCNC: 92 U/L (ref 15–37)
BACTERIA URNS QL MICRO: ABNORMAL /HPF
BASE EXCESS BLDV CALC-SCNC: -2.2 MMOL/L (ref -3–3)
BASOPHILS # BLD: 0.1 K/UL (ref 0–0.2)
BASOPHILS NFR BLD: 1.1 %
BILIRUB SERPL-MCNC: 0.6 MG/DL (ref 0–1)
BILIRUB UR QL STRIP.AUTO: NEGATIVE
BUN SERPL-MCNC: 47 MG/DL (ref 7–20)
CALCIUM SERPL-MCNC: 9 MG/DL (ref 8.3–10.6)
CHLORIDE SERPL-SCNC: 99 MMOL/L (ref 99–110)
CLARITY UR: CLEAR
CO2 BLDV-SCNC: 54 MMOL/L
CO2 SERPL-SCNC: 20 MMOL/L (ref 21–32)
COHGB MFR BLDV: 4 % (ref 0–1.5)
COLOR UR: ABNORMAL
CREAT SERPL-MCNC: 5.5 MG/DL (ref 0.6–1.1)
DEPRECATED RDW RBC AUTO: 17.9 % (ref 12.4–15.4)
DO-HGB MFR BLDV: 3 %
EKG ATRIAL RATE: 91 BPM
EKG DIAGNOSIS: NORMAL
EKG P AXIS: 20 DEGREES
EKG P-R INTERVAL: 166 MS
EKG Q-T INTERVAL: 360 MS
EKG QRS DURATION: 86 MS
EKG QTC CALCULATION (BAZETT): 442 MS
EKG R AXIS: 7 DEGREES
EKG T AXIS: 29 DEGREES
EKG VENTRICULAR RATE: 91 BPM
EOSINOPHIL # BLD: 0.4 K/UL (ref 0–0.6)
EOSINOPHIL NFR BLD: 6.4 %
EPI CELLS #/AREA URNS AUTO: 5 /HPF (ref 0–5)
EPITHELIALS (RENAL), 013149: PRESENT
GFR SERPLBLD CREATININE-BSD FMLA CKD-EPI: 9 ML/MIN/{1.73_M2}
GLUCOSE BLD-MCNC: 108 MG/DL (ref 70–99)
GLUCOSE BLD-MCNC: 125 MG/DL (ref 70–99)
GLUCOSE BLD-MCNC: 136 MG/DL (ref 70–99)
GLUCOSE BLD-MCNC: 143 MG/DL (ref 70–99)
GLUCOSE BLD-MCNC: 96 MG/DL (ref 70–99)
GLUCOSE SERPL-MCNC: 129 MG/DL (ref 70–99)
GLUCOSE UR STRIP.AUTO-MCNC: 250 MG/DL
HCO3 BLDV-SCNC: 23 MMOL/L (ref 23–29)
HCT VFR BLD AUTO: 33.1 % (ref 36–48)
HGB BLD-MCNC: 11 G/DL (ref 12–16)
HGB UR QL STRIP.AUTO: NEGATIVE
HYALINE CASTS #/AREA URNS AUTO: 6 /LPF (ref 0–8)
KETONES UR STRIP.AUTO-MCNC: NEGATIVE MG/DL
LEUKOCYTE ESTERASE UR QL STRIP.AUTO: ABNORMAL
LYMPHOCYTES # BLD: 0.9 K/UL (ref 1–5.1)
LYMPHOCYTES NFR BLD: 14 %
MCH RBC QN AUTO: 30.1 PG (ref 26–34)
MCHC RBC AUTO-ENTMCNC: 33.1 G/DL (ref 31–36)
MCV RBC AUTO: 90.8 FL (ref 80–100)
METHGB MFR BLDV: 1.2 %
MONOCYTES # BLD: 0.7 K/UL (ref 0–1.3)
MONOCYTES NFR BLD: 11.2 %
NEUTROPHILS # BLD: 4.3 K/UL (ref 1.7–7.7)
NEUTROPHILS NFR BLD: 67.3 %
NITRITE UR QL STRIP.AUTO: NEGATIVE
O2 CT VFR BLDV CALC: 14 VOL %
O2 THERAPY: ABNORMAL
PCO2 BLDV: 40.2 MMHG (ref 40–50)
PERFORMED ON: ABNORMAL
PERFORMED ON: NORMAL
PH BLDV: 7.37 [PH] (ref 7.35–7.45)
PH UR STRIP.AUTO: 7 [PH] (ref 5–8)
PLATELET # BLD AUTO: 164 K/UL (ref 135–450)
PMV BLD AUTO: 7.9 FL (ref 5–10.5)
PO2 BLDV: 128 MMHG (ref 25–40)
POTASSIUM SERPL-SCNC: 4.9 MMOL/L (ref 3.5–5.1)
PROCALCITONIN SERPL IA-MCNC: 2.18 NG/ML (ref 0–0.15)
PROT SERPL-MCNC: 6.8 G/DL (ref 6.4–8.2)
PROT UR STRIP.AUTO-MCNC: 300 MG/DL
RBC # BLD AUTO: 3.64 M/UL (ref 4–5.2)
RBC CLUMPS #/AREA URNS AUTO: 3 /HPF (ref 0–4)
SAO2 % BLDV: 97 %
SODIUM SERPL-SCNC: 137 MMOL/L (ref 136–145)
SP GR UR STRIP.AUTO: 1.01 (ref 1–1.03)
TROPONIN, HIGH SENSITIVITY: 272 NG/L (ref 0–14)
TROPONIN, HIGH SENSITIVITY: 278 NG/L (ref 0–14)
UA COMPLETE W REFLEX CULTURE PNL UR: ABNORMAL
UA DIPSTICK W REFLEX MICRO PNL UR: YES
URN SPEC COLLECT METH UR: ABNORMAL
UROBILINOGEN UR STRIP-ACNC: 1 E.U./DL
WBC # BLD AUTO: 6.4 K/UL (ref 4–11)
WBC #/AREA URNS AUTO: 2 /HPF (ref 0–5)

## 2025-07-16 PROCEDURE — 93005 ELECTROCARDIOGRAM TRACING: CPT | Performed by: EMERGENCY MEDICINE

## 2025-07-16 PROCEDURE — 84145 PROCALCITONIN (PCT): CPT

## 2025-07-16 PROCEDURE — 97530 THERAPEUTIC ACTIVITIES: CPT

## 2025-07-16 PROCEDURE — 97161 PT EVAL LOW COMPLEX 20 MIN: CPT

## 2025-07-16 PROCEDURE — 85025 COMPLETE CBC W/AUTO DIFF WBC: CPT

## 2025-07-16 PROCEDURE — 96372 THER/PROPH/DIAG INJ SC/IM: CPT

## 2025-07-16 PROCEDURE — 81001 URINALYSIS AUTO W/SCOPE: CPT

## 2025-07-16 PROCEDURE — 93010 ELECTROCARDIOGRAM REPORT: CPT | Performed by: INTERNAL MEDICINE

## 2025-07-16 PROCEDURE — G0378 HOSPITAL OBSERVATION PER HR: HCPCS

## 2025-07-16 PROCEDURE — 82803 BLOOD GASES ANY COMBINATION: CPT

## 2025-07-16 PROCEDURE — 99285 EMERGENCY DEPT VISIT HI MDM: CPT

## 2025-07-16 PROCEDURE — 90935 HEMODIALYSIS ONE EVALUATION: CPT

## 2025-07-16 PROCEDURE — 6370000000 HC RX 637 (ALT 250 FOR IP): Performed by: STUDENT IN AN ORGANIZED HEALTH CARE EDUCATION/TRAINING PROGRAM

## 2025-07-16 PROCEDURE — 71045 X-RAY EXAM CHEST 1 VIEW: CPT

## 2025-07-16 PROCEDURE — 80053 COMPREHEN METABOLIC PANEL: CPT

## 2025-07-16 PROCEDURE — 84484 ASSAY OF TROPONIN QUANT: CPT

## 2025-07-16 PROCEDURE — 6360000002 HC RX W HCPCS: Performed by: STUDENT IN AN ORGANIZED HEALTH CARE EDUCATION/TRAINING PROGRAM

## 2025-07-16 RX ORDER — HYDRALAZINE HYDROCHLORIDE 25 MG/1
50 TABLET, FILM COATED ORAL EVERY 8 HOURS SCHEDULED
Status: DISCONTINUED | OUTPATIENT
Start: 2025-07-16 | End: 2025-07-16

## 2025-07-16 RX ORDER — AMLODIPINE BESYLATE 5 MG/1
5 TABLET ORAL DAILY
Status: DISCONTINUED | OUTPATIENT
Start: 2025-07-16 | End: 2025-07-18

## 2025-07-16 RX ORDER — DEXTROSE MONOHYDRATE 100 MG/ML
INJECTION, SOLUTION INTRAVENOUS CONTINUOUS PRN
Status: DISCONTINUED | OUTPATIENT
Start: 2025-07-16 | End: 2025-07-18 | Stop reason: HOSPADM

## 2025-07-16 RX ORDER — BUSPIRONE HYDROCHLORIDE 5 MG/1
10 TABLET ORAL 2 TIMES DAILY
Status: DISCONTINUED | OUTPATIENT
Start: 2025-07-16 | End: 2025-07-18 | Stop reason: HOSPADM

## 2025-07-16 RX ORDER — HYDRALAZINE HYDROCHLORIDE 25 MG/1
50 TABLET, FILM COATED ORAL EVERY 8 HOURS SCHEDULED
Status: DISCONTINUED | OUTPATIENT
Start: 2025-07-16 | End: 2025-07-18 | Stop reason: HOSPADM

## 2025-07-16 RX ORDER — INSULIN LISPRO 100 [IU]/ML
0-4 INJECTION, SOLUTION INTRAVENOUS; SUBCUTANEOUS
Status: DISCONTINUED | OUTPATIENT
Start: 2025-07-16 | End: 2025-07-18 | Stop reason: HOSPADM

## 2025-07-16 RX ORDER — SEVELAMER CARBONATE 0.8 G/1
2.4 POWDER, FOR SUSPENSION ORAL
Status: DISCONTINUED | OUTPATIENT
Start: 2025-07-16 | End: 2025-07-18 | Stop reason: HOSPADM

## 2025-07-16 RX ORDER — ONDANSETRON 2 MG/ML
4 INJECTION INTRAMUSCULAR; INTRAVENOUS EVERY 6 HOURS PRN
Status: DISCONTINUED | OUTPATIENT
Start: 2025-07-16 | End: 2025-07-18 | Stop reason: HOSPADM

## 2025-07-16 RX ORDER — GLUCAGON 1 MG/ML
1 KIT INJECTION PRN
Status: DISCONTINUED | OUTPATIENT
Start: 2025-07-16 | End: 2025-07-18 | Stop reason: HOSPADM

## 2025-07-16 RX ORDER — ALBUTEROL SULFATE 90 UG/1
2 INHALANT RESPIRATORY (INHALATION) EVERY 6 HOURS PRN
Status: DISCONTINUED | OUTPATIENT
Start: 2025-07-16 | End: 2025-07-18 | Stop reason: HOSPADM

## 2025-07-16 RX ORDER — TORSEMIDE 20 MG/1
40 TABLET ORAL DAILY
Status: DISCONTINUED | OUTPATIENT
Start: 2025-07-16 | End: 2025-07-18 | Stop reason: HOSPADM

## 2025-07-16 RX ORDER — ACETAMINOPHEN 325 MG/1
650 TABLET ORAL EVERY 6 HOURS PRN
Status: DISCONTINUED | OUTPATIENT
Start: 2025-07-16 | End: 2025-07-16

## 2025-07-16 RX ORDER — CLONIDINE HYDROCHLORIDE 0.1 MG/1
0.2 TABLET ORAL 3 TIMES DAILY
Status: DISCONTINUED | OUTPATIENT
Start: 2025-07-16 | End: 2025-07-16

## 2025-07-16 RX ORDER — CLONIDINE HYDROCHLORIDE 0.1 MG/1
0.1 TABLET ORAL 3 TIMES DAILY
Status: DISCONTINUED | OUTPATIENT
Start: 2025-07-16 | End: 2025-07-18 | Stop reason: HOSPADM

## 2025-07-16 RX ORDER — ACETAMINOPHEN 650 MG/1
650 SUPPOSITORY RECTAL EVERY 6 HOURS PRN
Status: DISCONTINUED | OUTPATIENT
Start: 2025-07-16 | End: 2025-07-16

## 2025-07-16 RX ORDER — PANTOPRAZOLE SODIUM 40 MG/1
40 TABLET, DELAYED RELEASE ORAL
Status: DISCONTINUED | OUTPATIENT
Start: 2025-07-16 | End: 2025-07-18 | Stop reason: HOSPADM

## 2025-07-16 RX ORDER — ASPIRIN 81 MG/1
81 TABLET ORAL DAILY
Status: DISCONTINUED | OUTPATIENT
Start: 2025-07-16 | End: 2025-07-18 | Stop reason: HOSPADM

## 2025-07-16 RX ORDER — VENLAFAXINE HYDROCHLORIDE 37.5 MG/1
37.5 CAPSULE, EXTENDED RELEASE ORAL
Status: DISCONTINUED | OUTPATIENT
Start: 2025-07-16 | End: 2025-07-18 | Stop reason: HOSPADM

## 2025-07-16 RX ORDER — HEPARIN SODIUM 5000 [USP'U]/ML
5000 INJECTION, SOLUTION INTRAVENOUS; SUBCUTANEOUS EVERY 8 HOURS SCHEDULED
Status: DISCONTINUED | OUTPATIENT
Start: 2025-07-16 | End: 2025-07-18 | Stop reason: HOSPADM

## 2025-07-16 RX ORDER — METOPROLOL TARTRATE 50 MG
100 TABLET ORAL 2 TIMES DAILY
Status: DISCONTINUED | OUTPATIENT
Start: 2025-07-16 | End: 2025-07-18 | Stop reason: HOSPADM

## 2025-07-16 RX ADMIN — SEVELAMER CARBONATE FOR ORAL SUSPENSION 2.4 G: 800 POWDER, FOR SUSPENSION ORAL at 12:26

## 2025-07-16 RX ADMIN — PANTOPRAZOLE SODIUM 40 MG: 40 TABLET, DELAYED RELEASE ORAL at 16:19

## 2025-07-16 RX ADMIN — SEVELAMER CARBONATE FOR ORAL SUSPENSION 2.4 G: 800 POWDER, FOR SUSPENSION ORAL at 16:17

## 2025-07-16 RX ADMIN — CLONIDINE HYDROCHLORIDE 0.1 MG: 0.1 TABLET ORAL at 08:54

## 2025-07-16 RX ADMIN — METOPROLOL TARTRATE 100 MG: 50 TABLET, FILM COATED ORAL at 08:55

## 2025-07-16 RX ADMIN — CLONIDINE HYDROCHLORIDE 0.1 MG: 0.1 TABLET ORAL at 20:26

## 2025-07-16 RX ADMIN — VENLAFAXINE HYDROCHLORIDE 37.5 MG: 37.5 CAPSULE, EXTENDED RELEASE ORAL at 08:54

## 2025-07-16 RX ADMIN — PANTOPRAZOLE SODIUM 40 MG: 40 TABLET, DELAYED RELEASE ORAL at 05:48

## 2025-07-16 RX ADMIN — BUSPIRONE HYDROCHLORIDE 10 MG: 5 TABLET ORAL at 20:26

## 2025-07-16 RX ADMIN — METOPROLOL TARTRATE 100 MG: 50 TABLET, FILM COATED ORAL at 20:26

## 2025-07-16 RX ADMIN — HYDRALAZINE HYDROCHLORIDE 50 MG: 25 TABLET ORAL at 20:26

## 2025-07-16 RX ADMIN — ASPIRIN 81 MG: 81 TABLET, COATED ORAL at 08:55

## 2025-07-16 RX ADMIN — HEPARIN SODIUM 5000 UNITS: 5000 INJECTION, SOLUTION INTRAVENOUS; SUBCUTANEOUS at 20:26

## 2025-07-16 RX ADMIN — CLONIDINE HYDROCHLORIDE 0.1 MG: 0.1 TABLET ORAL at 16:19

## 2025-07-16 RX ADMIN — HYDRALAZINE HYDROCHLORIDE 50 MG: 25 TABLET ORAL at 16:19

## 2025-07-16 RX ADMIN — BUSPIRONE HYDROCHLORIDE 10 MG: 5 TABLET ORAL at 08:54

## 2025-07-16 RX ADMIN — SEVELAMER CARBONATE FOR ORAL SUSPENSION 2.4 G: 800 POWDER, FOR SUSPENSION ORAL at 09:01

## 2025-07-16 RX ADMIN — TORSEMIDE 40 MG: 20 TABLET ORAL at 08:55

## 2025-07-16 ASSESSMENT — PAIN SCALES - GENERAL
PAINLEVEL_OUTOF10: 8
PAINLEVEL_OUTOF10: 8

## 2025-07-16 ASSESSMENT — PAIN - FUNCTIONAL ASSESSMENT: PAIN_FUNCTIONAL_ASSESSMENT: 0-10

## 2025-07-16 ASSESSMENT — PAIN DESCRIPTION - LOCATION: LOCATION: BACK

## 2025-07-16 ASSESSMENT — PAIN DESCRIPTION - DESCRIPTORS: DESCRIPTORS: DISCOMFORT

## 2025-07-16 ASSESSMENT — PAIN DESCRIPTION - ORIENTATION: ORIENTATION: UPPER

## 2025-07-16 NOTE — ED PROVIDER NOTES
[facility] EMERGENCY DEPARTMENT     EMERGENCY DEPARTMENT ENCOUNTER            Pt Name: Kristine Ramirez   MRN: [id number]   Birthdate 1975   Date of evaluation: 7/16/2025   Provider: Crispin Santana MD   PCP: Damon Mireles MD   Note Started: 1:35 AM EDT 7/16/25          CHIEF COMPLAINT     Chief Complaint   Patient presents with    Fatigue     Pt via [facility] ems w/ cc of generalized weakness. Missed Dialysis today.              HISTORY OF PRESENT ILLNESS:   History from : Patient   Limitations to history : None     Kristine Ramirez is a 50 y.o. female who presents from home via EMS for weakness.  Patient tells me that she was recently with a hospital for dialysis.  Last got dialysis on Sunday.  Since then she has generalized weakness.  She denies any pain.  Tells me she has been unable to get around at home due to the overall weakness.  Denies any fevers.  She is still eating and drinking.  No nausea or vomiting.    Nursing Notes were all reviewed and agreed with, or any disagreements were addressed in the HPI.     REVIEW OF SYSTEMS :    Positives and Pertinent negatives as per HPI.      MEDICAL HISTORY   has a past medical history of Acute respiratory failure due to COVID-19 ([facility]) (10/16/2021), Arterial ischemic stroke, ICA, left, acute ([facility]), Blind in both eyes, Cerebral artery occlusion with cerebral infarction ([facility]), CHF (congestive heart failure) ([facility]), Chronic kidney disease, COPD (chronic obstructive pulmonary disease) ([facility]), Depression, Diabetes mellitus out of control, Diabetes mellitus, type II ([facility]), Diabetic neuropathy associated with type 2 diabetes mellitus ([facility]), Generalized headaches, Hypertension, Infertility, Insomnia, Migraine headache (11/09/2011), Mixed hyperlipidemia, Otitis media, Pelvic abscess in female (10/05/2013), Pneumonia, Stroke ([facility]) (08/27/2020), and Stroke ([facility]) (08/27/2021).    Past Surgical History:   Procedure Laterality Date

## 2025-07-16 NOTE — PROGRESS NOTES
Hospitalist Progress Note      Name:  Kristine Ramirez /Age/Sex: 1975  (50 y.o. female)   MRN & CSN:  [id number] & [id number] Encounter Date/Time: 2025 9:47 AM EDT   Location:  5TN-5561/5561-01 PCP: Damon Mireles MD     Attending:Jourdan Steve MD       Hospital Day: 1    Subjective:   Chief Complaint:   Chief Complaint   Patient presents with    Fatigue     Pt via [facility] ems w/ cc of generalized weakness. Missed Dialysis today.      Kristine Ramirez is a 50 y.o. female with a past medical history of hypertension, hyperlipidemia, DM2, CHF, CKD, CVA residual right sided weakness, blindness, migraine who presented with fatigue. Recent admission AMS, fluid overload 2025 and again 2025 and treated for multifocal PNA. Presented with sudden weakness after missing HD Monday.      Interval History:  Today, she is resting in bed, feeling weaker overall.  A&O x3.  Denies Confusion.  Reports residual right sided weakness from prior stroke unchanged.  She is blind.      Independently reviewed interval ancillary notes from nephrology.     Assessment and Recommendations   Problem List  Principal Problem:    Generalized weakness  Resolved Problems:    * No resolved hospital problems. *     Assessment and Plan:    Generalized weakness  Ambulatory dysfunction   Probably due to recent hospitalization, underlying co morbidities  Right>left lower extremity weakness (noted on PT OT exam notes on recent admission)  Low suspicion for GBS, spinal cord compression, normal TSH 3 months ago  If persistent symptoms may need MRI brain +/- LP  Consult PT OT may need rehab placement  Consult neurology   Follow mentation after HD     Recent admission for pneumonia discharged 2025  Was on IV Rocephin and azithromycin inpatient but not provided with oral antibiotics on discharge.  Clinically and radiographically she has improved  No indication for abx therapy at this time     Chronically  elevated troponin  - EKG personally reviewed normal sinus rhythm 91/min no acute ST-T wave changes  - Continue aspirin     ESRD on HD MWF  - Left subclavian tunneled HD catheter   - Reports she miss HD on Monday   - Nephrology following     Hypertension  - Elevated, will allow higher BP today   - Hold amlodipine due to bilateral lower extremity edema  - Reduce dose of clonidine and continue hydralazine Lopressor and torsemide     Chronic diastolic heart failure  - Appears compensated  - Continue home torsemide  - Fluid removal per HD, 1+ BLE edema and pleural effusion on chest imaging     Persistently elevated LFTs  Check liver ultrasound     DM2  - A1C 6.7 (3/21/61441)  - Continue low dose correction  - POCT glucose achs and hypoglycemia protocol      GERD  Continue home Protonix    Plan:   No new symptoms, continue current therapy   Liver ultrasound pending  Consult Neurology   HD today, she reports that she missed her HD session on Monday   NPO at midnight    Discussed care with patient and nursing.   Reviewed with Dr. Steve, agrees with plan     Diet: ADULT DIET; Regular; Low Sodium (2 gm)  Code Status: Full Code  DVT Prophylaxis: Heparin  Disposition PTA: Home   Discharge Disposition: SNF  Surrogate Decision Maker/ POA: Spouse Kannan    Review of Systems:      Pertinent positives and negatives discussed in HPI    Objective:   No intake or output data in the 24 hours ending 07/16/25 0947   Vitals:   Vitals:    07/16/25 0200 07/16/25 0343 07/16/25 0412 07/16/25 0852   BP: (!) 146/75 131/72  (!) 154/81   Pulse: 90 88 87 83   Resp: 11 12 12 16   Temp:  98.5 °F (36.9 °C)  98 °F (36.7 °C)   TempSrc:  Oral  Oral   SpO2: 93% 93% 93% 92%   Weight:       Height:           Physical Exam:    Physical Examination:  Vitals:    07/16/25 0852   BP: (!) 154/81   Pulse: 83   Resp: 16   Temp: 98 °F (36.7 °C)   SpO2: 92%      No intake/output data recorded.   Wt Readings from Last 3 Encounters:   07/16/25 64.4 kg (142 lb)   07/13/25

## 2025-07-16 NOTE — PROGRESS NOTES
Patient is at admitted to unit at 4:00 am, from ED for Generalized weakness. Routine vital sign obtained and stable , respirations are easy and unlabored, Patient was alert and oriented to self , time, place and situation. Patient does not appear to be in distress, resting comfortably in bed, fall precautions in place. Admission reassessment completed, call light within reach.

## 2025-07-16 NOTE — PROCEDURES
MD Isaac Jeong MD Aldo Estella, DO                 Office: [phone]                 Fax: [phone]         [employer]                       HEMODIALYSIS  inpatient visit note:     PATIENT NAME: Kristine Ramirez  : 1975  MRN: [id number]    Indication for Dialysis: ESRD    Patient seen on dialysis treatment.  Tolerating treatment  Fairly well    Vitals:    25 1230   BP: (!) 142/75   Pulse: 81   Resp: 18   Temp: 97.3 °F (36.3 °C)   SpO2:        General: No acute distress   CVS:  Heart sounds S1 S2 +     RS: Normal respiratory effort, Breat sound: diminished at bases.     Abd: bowel sounds +,  distension .   Extremities/MSK:  Edema,        Vascular Access:      Permanent Vascular access:  upper extremity AV : Access site demonstrates: normal thrill/bruit; no pseudoaneurysm, redness, pain or discharge         Labs Reviewed  by me   Labs   Lab Results   Component Value Date    CREATININE 5.5 (HH) 2025    BUN 47 (H) 2025     2025    K 4.9 2025    CL 99 2025    CO2 20 (L) 2025     Lab Results   Component Value Date    WBC 6.4 2025    HGB 11.0 (L) 2025    HCT 33.1 (L) 2025    MCV 90.8 2025     2025       Dialysis Treatment and Prescription reviewed    RX:  See dialysis flowsheet for specifics on access, blood flow rate, dialysate baths, duration of dialysis, anticoagulation and other technical information.    COMMENTS:  Stable on dialysis.    Continue to Target dry weight and clearance.    Monitor closely for any hypotension    :Tolerating dialysis well, with no complications.  Hypotension on dialysis-stable to improved.  Good flow access.  :Anemia. Stable.Continue Aransep.    :Fluid Overload. Stable    Fluid removal as tolerated with dialysis.  :Stable from Renal.  Continue out patient Dialysis treatments.  Please refer to the orders.   Dialysis treatment plan and dialysis orders discussed  with dialysis RN   at bedside.    Thank you for allowing me to participate in this patient's care. Please do not hesitate to contact me for any questions/concerns. We will follow along with you.     Isaac Coon MD   [facility]   Office: [phone] or Via [employer]  Fax: [phone]

## 2025-07-16 NOTE — PROGRESS NOTES
0930- Morning assessments and vital signs complete. Morning labs reviewed. Abnormal values noted. MD is aware. Patient noted to be lethargic but easily awakened. Patient given scheduled medications as prescribed. Patient assisted to set up for breakfast, she is legally blind. She denies pain at this time. Update of care discussed.     1300- Transport at bedside to take patient to dialysis via bed. Patient condition and vital signs are stable at time of transport.    1500- Call received from ultrasound informing of liver ultrasound order. Patient needs to be NPO for this imaging. Verbal order noted for NPO after midnight. Patient will be informed when she get back from dialysis.    1615- Patient returned to unit from dialysis. Patient is more alert at this time. Vital signs obtained. BP noted to be elevated. Scheduled medications given. Patient assisted to set up her meal. She denies pain. Update of care discussed.

## 2025-07-16 NOTE — PROGRESS NOTES
Treatment time: 3 hours    Net UF: 1 liter    Pre weight: 64.7 kg  Post weight: 63.7 kg    Access used: Left CVC   Access function: Access site located on the Left CVC had clean dry and intact CVC dressing. No signs of infection noted. No redness, hematoma nor swelling was observed. No discharges was seen. Both ports had good flow. CVC dressing changed due on Friday, 7/18/2025.     Medications or blood products given: heparin dwell    Regular outpatient schedule: M,W,F    Summary of response to treatment: 12:39: Treatment set at 1 liter for 3 hours via Left CVC. Access site located on the Left CVC had clean dry and intact CVC dressing. No signs of infection noted. No redness, hematoma nor swelling was observed. No discharges was seen. Both ports had good flow. CVC dressing changed due on Friday, 7/18/2025. Parameters set accordingly. Hooked to HD machine. Monitored from time to time. 15:39: Treatment completed Returned blood aseptically. HD tolerated.     Copy of dialysis treatment record placed in chart, to be scanned into EMR.

## 2025-07-16 NOTE — PROGRESS NOTES
[facility] - Inpatient Rehabilitation Department   Phone: [phone]    Physical Therapy    [x] Initial Evaluation            [] Daily Treatment Note         [] Discharge Summary      Patient: Kristine Ramirez   : 1975   MRN: [id number]   Date of Service:  2025  Admitting Diagnosis: Generalized weakness  Current Admission Summary: 50 y.o. female who presents from home via EMS for weakness.  Patient tells me that she was recently with a hospital for dialysis.  Last got dialysis on .  Since then she has generalized weakness.  She denies any pain.  Tells me she has been unable to get around at home due to the overall weakness.  Denies any fevers.  She is still eating and drinking.  No nausea or vomiting.   Past Medical History:  has a past medical history of Acute respiratory failure due to COVID-19 ([facility]), Arterial ischemic stroke, ICA, left, acute ([facility]), Blind in both eyes, Cerebral artery occlusion with cerebral infarction ([facility]), CHF (congestive heart failure) ([facility]), Chronic kidney disease, COPD (chronic obstructive pulmonary disease) ([facility]), Depression, Diabetes mellitus out of control, Diabetes mellitus, type II ([facility]), Diabetic neuropathy associated with type 2 diabetes mellitus ([facility]), Generalized headaches, Hypertension, Infertility, Insomnia, Migraine headache, Mixed hyperlipidemia, Otitis media, Pelvic abscess in female, Pneumonia, Stroke ([facility]), and Stroke ([facility]).  Past Surgical History:  has a past surgical history that includes Cervix surgery; eye surgery; Foot surgery (Right); Foot surgery (Bilateral); Foot surgery (Left); IR TUNNELED CVC PLACE WO SQ PORT/PUMP > 5 YEARS (2021); Dialysis fistula creation (Right, 2/10/2022); Upper gastrointestinal endoscopy (N/A, 2024); Upper gastrointestinal endoscopy (N/A, 2024); Cardiac procedure (N/A, 3/18/2025); and IR TUNNELED CVC PLACE WO SQ PORT/PUMP > 5 YEARS (2025).  Discharge Recommendations: Kristine Ramirez

## 2025-07-16 NOTE — PROGRESS NOTES
4 Eyes Skin Assessment     NAME:  Kristine Ramirez  YOB: 1975  MEDICAL RECORD NUMBER:  [id number]    The patient is being assessed for  Admission    I agree that at least one RN has performed a thorough Head to Toe Skin Assessment on the patient. ALL assessment sites listed below have been assessed.      Areas assessed by both nurses:    Head, Face, Ears, Shoulders, Back, Chest, Arms, Elbows, Hands, Sacrum. Buttock, Coccyx, Ischium, and Legs. Feet and Heels        Does the Patient have a Wound? Yes wound(s) were present on assessment. LDA wound assessment was Initiated and completed by RN       Robbie Prevention initiated by RN: Yes  Wound Care Orders initiated by RN: Yes    Pressure Injury (Stage 3,4, Unstageable, DTI, NWPT, and Complex wounds) if present, place Wound referral order by RN under : No    New Ostomies, if present place, Ostomy referral order under : No     Nurse 1 eSignature: Electronically signed by Janette Sorto RN on 7/16/25 at 5:37 AM EDT    **SHARE this note so that the co-signing nurse can place an eSignature**    Nurse 2 eSignature: {Esignature:[id number]}

## 2025-07-16 NOTE — CARE COORDINATION
07/16/25 1451   Service Assessment   Patient Orientation Unable to Assess  (Patient off the floor for HD)     Electronically signed by Zahraa Garcia on 7/16/25 at 2:51 PM EDT

## 2025-07-16 NOTE — CARE COORDINATION
Wound Referral Progress Note       NAME:  Kristine Ramirez  MEDICAL RECORD NUMBER:  [id number]  AGE: 50 y.o.   GENDER: female  : 1975  TODAY'S DATE:  2025    Subjective   Reason for WOCN Evaluation and Assessment: injury to right toenail       Kristine Ramirez is a 50 y.o. female referred by:   Provider and Nursing    Wound Identification:  Wound Type: traumatic  Contributing Factors: diabetes and poor glucose control    Wound History: per patient she was cutting her toenails  Current Wound Care Treatment:  open to air    Patient Care Goal:  Wound Healing        PAST MEDICAL HISTORY        Diagnosis Date    Acute respiratory failure due to COVID-19 ([facility]) 10/16/2021    Arterial ischemic stroke, ICA, left, acute ([facility])     Blind in both eyes     Cerebral artery occlusion with cerebral infarction ([facility])     CHF (congestive heart failure) ([facility])     Chronic kidney disease     COPD (chronic obstructive pulmonary disease) ([facility])     Depression     Diabetes mellitus out of control     Diabetes mellitus, type II ([facility])         Diabetic neuropathy associated with type 2 diabetes mellitus ([facility])     Generalized headaches     Hypertension     Infertility     Insomnia     chronic vs lack of time spent to sleep    Migraine headache 2011    Mixed hyperlipidemia     Otitis media     h/o recurrent    Pelvic abscess in female 10/05/2013    Pneumonia     2004 approx.    Stroke ([facility]) 2020    Stroke ([facility]) 2021       PAST SURGICAL HISTORY    Past Surgical History:   Procedure Laterality Date    CARDIAC PROCEDURE N/A 3/18/2025    Pericardiocentesis performed by Bartolo Hawley MD at [facility] CARDIAC CATH LAB    CERVIX SURGERY      laser tx for dysplasia;    DIALYSIS FISTULA CREATION Right 2/10/2022    RIGHT BRACHIO CEPHALIC FISTULA CREATION performed by Christiano Lomeli II, MD at [facility] OR    EYE SURGERY      FOOT SURGERY Right     FOOT SURGERY Bilateral     FOOT SURGERY Left     IR TUNNELED CVC  PLACE WO SQ PORT/PUMP > 5 YEARS  9/7/2021    IR TUNNELED CATHETER PLACEMENT GREATER THAN 5 YEARS 9/7/2021 Omari Carson MD [facility] SPECIAL PROCEDURES    IR TUNNELED CVC PLACE WO SQ PORT/PUMP > 5 YEARS  6/13/2025    IR TUNNELED CVC PLACE WO SQ PORT/PUMP > 5 YEARS 6/13/2025 [facility] SPECIAL PROCEDURES    UPPER GASTROINTESTINAL ENDOSCOPY N/A 9/5/2024    ESOPHAGOGASTRODUODENOSCOPY performed by Joss Bernal MD at [facility] ENDOSCOPY    UPPER GASTROINTESTINAL ENDOSCOPY N/A 12/5/2024    ESOPHAGOGASTRODUODENOSCOPY BIOPSY performed by Kina Montelongo MD at [facility] ENDOSCOPY       FAMILY HISTORY    Family History   Problem Relation Age of Onset    Diabetes Mother     Other Mother 67        Covid    Diabetes Father     High Blood Pressure Father     Colon Cancer Father     Diabetes Sister     Alcohol Abuse Maternal Grandfather     Diabetes Paternal Grandmother     Alcohol Abuse Paternal Grandfather     Diabetes Paternal Aunt     Diabetes Paternal Uncle        SOCIAL HISTORY    Social History     Tobacco Use    Smoking status: Every Day     Current packs/day: 1.00     Average packs/day: 1 pack/day for 15.0 years (15.0 ttl pk-yrs)     Types: Cigarettes    Smokeless tobacco: Never    Tobacco comments:     Quit in August 2021- started back up   Vaping Use    Vaping status: Never Used   Substance Use Topics    Alcohol use: No     Comment: Very Rare    Drug use: No       ALLERGIES    Allergies   Allergen Reactions    Amoxicillin Hives, Itching and Other (See Comments)     Tolerates cephalosporins  Patient tolerating cefazolin (ANCEF) as of October 11, 2018      Atorvastatin Diarrhea    Levofloxacin Anaphylaxis    Levofloxacin Anaphylaxis and Other (See Comments)    Vancomycin Anaphylaxis, Hives and Shortness Of Breath    Dilaudid [Hydromorphone] Other (See Comments)     Itching without hives. No anaphylaxis.     Tape [Adhesive Tape] Other (See Comments)     Paper tape turns skin bright red. Plastic tape okay.

## 2025-07-16 NOTE — H&P
V2.0  History and Physical      Name:  Kristine Ramirez /Age/Sex: 1975  (50 y.o. female)   MRN & CSN:  [id number] & [id number] Encounter Date/Time: 2025 3:16 AM EDT   Location:  [id number]-5561/5561-01 PCP: Damon Mireles MD       Assessment and Plan:   Kristine Ramirez is a 50 y.o. female with hypertension, hyperlipidemia, NIDDM, depression, chronic diastolic heart failure, ESRD on HD, GERD, recent admission for multifocal pneumonia presented from home with generalized weakness    Generalized weakness  Ambulatory dysfunction   Probably due to recent hospitalization, underlying co morbidities  Right>left lower extremity weakness (noted on PT OT exam notes on recent admission)  Low suspicion for GBS, spinal cord compression, normal TSH 3 months ago  If persistent symptoms may need MRI brain +/- LP  Consult PT OT may need rehab placement    Recent admission for pneumonia discharged 2025  Was on IV Rocephin and azithromycin inpatient but not provided with oral antibiotics on discharge.  Clinically and radiographically she has improved    Chronically elevated troponin  EKG personally reviewed normal sinus rhythm 91/min no acute ST-T wave changes  Continue aspirin    ESRD on HD MWF  Left subclavian tunneled HD catheter, last HD session Monday  Nephrology consultation for inpatient management of HD    Hypertension  Stable blood pressure trend  Hold amlodipine due to bilateral lower extremity edema  Reduce dose of clonidine and continue hydralazine Lopressor and torsemide    Chronic diastolic heart failure  Continue home torsemide, dialysis for volume management    Persistently elevated LFTs  Check liver ultrasound    NIDDM  Low-dose insulin sliding scale POC glucose ACHS hypoglycemia protocol    GERD  Continue home Protonix    Observation MedSurg  Full code    Disposition:     Current Living situation: Home  Expected Disposition: Rehab  Estimated D/C: 2 days    Diet ADULT DIET; Regular; Low

## 2025-07-16 NOTE — PLAN OF CARE
Problem: Chronic Conditions and Co-morbidities  Goal: Patient's chronic conditions and co-morbidity symptoms are monitored and maintained or improved  7/16/2025 0538 by Janette Sorto RN  Outcome: Progressing  7/16/2025 0411 by Lesley Rodriguez LPN  Outcome: Progressing     Problem: Pain  Goal: Verbalizes/displays adequate comfort level or baseline comfort level  7/16/2025 0538 by Janette Sorto RN  Outcome: Progressing  7/16/2025 0411 by Lesley Rodriguez LPN  Outcome: Progressing     Problem: Safety - Adult  Goal: Free from fall injury  Outcome: Progressing     Problem: Neurosensory - Adult  Goal: Achieves stable or improved neurological status  Outcome: Progressing  Goal: Absence of seizures  Outcome: Progressing  Goal: Achieves maximal functionality and self care  Outcome: Progressing     Problem: Skin/Tissue Integrity - Adult  Goal: Skin integrity remains intact  Outcome: Progressing  Goal: Incisions, wounds, or drain sites healing without S/S of infection  Outcome: Progressing  Goal: Oral mucous membranes remain intact  Outcome: Progressing     Problem: Musculoskeletal - Adult  Goal: Return mobility to safest level of function  Outcome: Progressing  Goal: Maintain proper alignment of affected body part  Outcome: Progressing  Goal: Return ADL status to a safe level of function  Outcome: Progressing     Problem: Genitourinary - Adult  Goal: Absence of urinary retention  Outcome: Progressing

## 2025-07-16 NOTE — PLAN OF CARE
Problem: Pain  Goal: Verbalizes/displays adequate comfort level or baseline comfort level  7/16/2025 0954 by Lauri Briseno RN  Outcome: Progressing  7/16/2025 0538 by Janette Sorto RN  Outcome: Progressing  7/16/2025 0411 by Lesley Rodriguez LPN  Outcome: Progressing

## 2025-07-17 ENCOUNTER — APPOINTMENT (OUTPATIENT)
Dept: ULTRASOUND IMAGING | Age: 50
End: 2025-07-17
Payer: COMMERCIAL

## 2025-07-17 ENCOUNTER — APPOINTMENT (OUTPATIENT)
Dept: MRI IMAGING | Age: 50
End: 2025-07-17
Payer: COMMERCIAL

## 2025-07-17 PROBLEM — G93.40 ACUTE ENCEPHALOPATHY: Status: ACTIVE | Noted: 2025-07-17

## 2025-07-17 LAB
ALBUMIN SERPL-MCNC: 3.7 G/DL (ref 3.4–5)
ALBUMIN/GLOB SERPL: 1.1 {RATIO} (ref 1.1–2.2)
ALP SERPL-CCNC: 155 U/L (ref 40–129)
ALT SERPL-CCNC: 85 U/L (ref 10–40)
ANION GAP SERPL CALCULATED.3IONS-SCNC: 16 MMOL/L (ref 3–16)
AST SERPL-CCNC: 47 U/L (ref 15–37)
BASOPHILS # BLD: 0 K/UL (ref 0–0.2)
BASOPHILS NFR BLD: 0.8 %
BILIRUB SERPL-MCNC: 0.6 MG/DL (ref 0–1)
BUN SERPL-MCNC: 32 MG/DL (ref 7–20)
CALCIUM SERPL-MCNC: 9.2 MG/DL (ref 8.3–10.6)
CHLORIDE SERPL-SCNC: 103 MMOL/L (ref 99–110)
CO2 SERPL-SCNC: 20 MMOL/L (ref 21–32)
CREAT SERPL-MCNC: 4 MG/DL (ref 0.6–1.1)
DEPRECATED RDW RBC AUTO: 17.5 % (ref 12.4–15.4)
EOSINOPHIL # BLD: 0.3 K/UL (ref 0–0.6)
EOSINOPHIL NFR BLD: 4.7 %
GFR SERPLBLD CREATININE-BSD FMLA CKD-EPI: 13 ML/MIN/{1.73_M2}
GLUCOSE BLD-MCNC: 119 MG/DL (ref 70–99)
GLUCOSE BLD-MCNC: 129 MG/DL (ref 70–99)
GLUCOSE BLD-MCNC: 141 MG/DL (ref 70–99)
GLUCOSE BLD-MCNC: 149 MG/DL (ref 70–99)
GLUCOSE SERPL-MCNC: 168 MG/DL (ref 70–99)
HCT VFR BLD AUTO: 34.5 % (ref 36–48)
HGB BLD-MCNC: 11.2 G/DL (ref 12–16)
LYMPHOCYTES # BLD: 0.8 K/UL (ref 1–5.1)
LYMPHOCYTES NFR BLD: 14.1 %
MCH RBC QN AUTO: 29.6 PG (ref 26–34)
MCHC RBC AUTO-ENTMCNC: 32.5 G/DL (ref 31–36)
MCV RBC AUTO: 90.9 FL (ref 80–100)
MONOCYTES # BLD: 0.5 K/UL (ref 0–1.3)
MONOCYTES NFR BLD: 9.3 %
NEUTROPHILS # BLD: 4.2 K/UL (ref 1.7–7.7)
NEUTROPHILS NFR BLD: 71.1 %
PERFORMED ON: ABNORMAL
PLATELET # BLD AUTO: 129 K/UL (ref 135–450)
PMV BLD AUTO: 7.4 FL (ref 5–10.5)
POTASSIUM SERPL-SCNC: 4.8 MMOL/L (ref 3.5–5.1)
PROT SERPL-MCNC: 7 G/DL (ref 6.4–8.2)
RBC # BLD AUTO: 3.79 M/UL (ref 4–5.2)
SODIUM SERPL-SCNC: 139 MMOL/L (ref 136–145)
WBC # BLD AUTO: 5.9 K/UL (ref 4–11)

## 2025-07-17 PROCEDURE — APPNB30 APP NON BILLABLE TIME 0-30 MINS

## 2025-07-17 PROCEDURE — 95819 EEG AWAKE AND ASLEEP: CPT

## 2025-07-17 PROCEDURE — 36415 COLL VENOUS BLD VENIPUNCTURE: CPT

## 2025-07-17 PROCEDURE — 6370000000 HC RX 637 (ALT 250 FOR IP): Performed by: STUDENT IN AN ORGANIZED HEALTH CARE EDUCATION/TRAINING PROGRAM

## 2025-07-17 PROCEDURE — 97535 SELF CARE MNGMENT TRAINING: CPT

## 2025-07-17 PROCEDURE — 95816 EEG AWAKE AND DROWSY: CPT | Performed by: PSYCHIATRY & NEUROLOGY

## 2025-07-17 PROCEDURE — 85025 COMPLETE CBC W/AUTO DIFF WBC: CPT

## 2025-07-17 PROCEDURE — 97530 THERAPEUTIC ACTIVITIES: CPT

## 2025-07-17 PROCEDURE — 94760 N-INVAS EAR/PLS OXIMETRY 1: CPT

## 2025-07-17 PROCEDURE — 97165 OT EVAL LOW COMPLEX 30 MIN: CPT

## 2025-07-17 PROCEDURE — 97166 OT EVAL MOD COMPLEX 45 MIN: CPT

## 2025-07-17 PROCEDURE — 80053 COMPREHEN METABOLIC PANEL: CPT

## 2025-07-17 PROCEDURE — G0378 HOSPITAL OBSERVATION PER HR: HCPCS

## 2025-07-17 PROCEDURE — 76705 ECHO EXAM OF ABDOMEN: CPT

## 2025-07-17 PROCEDURE — 99223 1ST HOSP IP/OBS HIGH 75: CPT | Performed by: PSYCHIATRY & NEUROLOGY

## 2025-07-17 PROCEDURE — 96372 THER/PROPH/DIAG INJ SC/IM: CPT

## 2025-07-17 PROCEDURE — APPSS60 APP SPLIT SHARED TIME 46-60 MINUTES

## 2025-07-17 PROCEDURE — 70551 MRI BRAIN STEM W/O DYE: CPT

## 2025-07-17 PROCEDURE — 6360000002 HC RX W HCPCS: Performed by: STUDENT IN AN ORGANIZED HEALTH CARE EDUCATION/TRAINING PROGRAM

## 2025-07-17 RX ADMIN — VENLAFAXINE HYDROCHLORIDE 37.5 MG: 37.5 CAPSULE, EXTENDED RELEASE ORAL at 09:54

## 2025-07-17 RX ADMIN — ASPIRIN 81 MG: 81 TABLET, COATED ORAL at 09:55

## 2025-07-17 RX ADMIN — BUSPIRONE HYDROCHLORIDE 10 MG: 5 TABLET ORAL at 09:54

## 2025-07-17 RX ADMIN — HEPARIN SODIUM 5000 UNITS: 5000 INJECTION, SOLUTION INTRAVENOUS; SUBCUTANEOUS at 20:47

## 2025-07-17 RX ADMIN — PANTOPRAZOLE SODIUM 40 MG: 40 TABLET, DELAYED RELEASE ORAL at 16:33

## 2025-07-17 RX ADMIN — CLONIDINE HYDROCHLORIDE 0.1 MG: 0.1 TABLET ORAL at 14:07

## 2025-07-17 RX ADMIN — HYDRALAZINE HYDROCHLORIDE 50 MG: 25 TABLET ORAL at 14:07

## 2025-07-17 RX ADMIN — METOPROLOL TARTRATE 100 MG: 50 TABLET, FILM COATED ORAL at 20:47

## 2025-07-17 RX ADMIN — TORSEMIDE 40 MG: 20 TABLET ORAL at 09:53

## 2025-07-17 RX ADMIN — HEPARIN SODIUM 5000 UNITS: 5000 INJECTION, SOLUTION INTRAVENOUS; SUBCUTANEOUS at 14:07

## 2025-07-17 RX ADMIN — CLONIDINE HYDROCHLORIDE 0.1 MG: 0.1 TABLET ORAL at 20:47

## 2025-07-17 RX ADMIN — BUSPIRONE HYDROCHLORIDE 10 MG: 5 TABLET ORAL at 20:47

## 2025-07-17 RX ADMIN — METOPROLOL TARTRATE 100 MG: 50 TABLET, FILM COATED ORAL at 09:54

## 2025-07-17 RX ADMIN — HYDRALAZINE HYDROCHLORIDE 50 MG: 25 TABLET ORAL at 20:47

## 2025-07-17 RX ADMIN — SEVELAMER CARBONATE FOR ORAL SUSPENSION 2.4 G: 800 POWDER, FOR SUSPENSION ORAL at 17:31

## 2025-07-17 RX ADMIN — CLONIDINE HYDROCHLORIDE 0.1 MG: 0.1 TABLET ORAL at 09:53

## 2025-07-17 NOTE — PLAN OF CARE
Problem: Chronic Conditions and Co-morbidities  Goal: Patient's chronic conditions and co-morbidity symptoms are monitored and maintained or improved  7/17/2025 1529 by Ezio León RN  Outcome: Progressing  7/17/2025 0530 by Janette Sorto RN  Outcome: Progressing     Problem: Pain  Goal: Verbalizes/displays adequate comfort level or baseline comfort level  7/17/2025 1529 by Ezio León RN  Outcome: Progressing  7/17/2025 0530 by Janette Sorto RN  Outcome: Progressing     Problem: Safety - Adult  Goal: Free from fall injury  7/17/2025 1529 by Ezio León RN  Outcome: Progressing  7/17/2025 0530 by Janette Sorto RN  Outcome: Progressing     Problem: Neurosensory - Adult  Goal: Achieves stable or improved neurological status  7/17/2025 1529 by Ezio León RN  Outcome: Progressing  7/17/2025 0530 by Janette Sorto RN  Outcome: Progressing  Goal: Absence of seizures  7/17/2025 1529 by Ezio León RN  Outcome: Progressing  7/17/2025 0530 by Janette Sorto RN  Outcome: Progressing  Goal: Achieves maximal functionality and self care  7/17/2025 1529 by Ezio León RN  Outcome: Progressing  7/17/2025 0530 by Janette Sorto RN  Outcome: Progressing     Problem: Skin/Tissue Integrity - Adult  Goal: Skin integrity remains intact  Description: 1.  Monitor for areas of redness and/or skin breakdown  2.  Assess vascular access sites hourly  3.  Every 4-6 hours minimum:  Change oxygen saturation probe site  4.  Every 4-6 hours:  If on nasal continuous positive airway pressure, respiratory therapy assess nares and determine need for appliance change or resting period  7/17/2025 1529 by Ezio León RN  Outcome: Progressing  7/17/2025 0530 by Janette Sorto RN  Outcome: Progressing  Goal: Incisions, wounds, or drain sites healing without S/S of infection  7/17/2025 1529 by Ezio León RN  Outcome: Progressing  7/17/2025 0530 by Janette Sorto RN  Outcome: Progressing  Goal: Oral mucous

## 2025-07-17 NOTE — CONSULTS
MD Isaac Jeong MD Aldo Estella, DO              Office: [phone]                      Fax: [phone]          NEPHROLOGY INITIAL CONSULT NOTE:     PATIENT NAME: Kristine Ramirez  : 1975  MRN: [id number]  REASON FOR CONSULT: I am asked to see this patient in consultation for my opinion regarding management of ESRD. My recommendations will be communicated by way of shared medical record.      IMPRESSION / RECOMMENDATION:      Admitted on:  2025  For:    Hospital Problems           Last Modified POA    * (Principal) Generalized weakness 2025 Yes       ESRD on iHD: MWF.   - outpt HD center: [facility] with   - Access: Hemodialysis-AV Fistula-Standard, Right Upper Arm, Other/Unknown  Access Placed on 2022      Recent admission after missing dialysis for 1 week  Encouraged compliance due to potentially life-threatening issue    Was discharged on 2025     Due for HD today, I arranged HD today - but HD staffing has been a challenge today       2. Hypertension/Volume Status:  - BP hypertension, slightly uncontrolled continue current plan, okay to keep slightly higher-to give more room for fluid removal with dialysis:   - Na natremia mild chronic improving, follow with dialysis  - EDW 63.5 kg : Above dry weight,  - fluid removal to DW as tolerated    3. Electrolytes/acid-base:  K: WN now   Last admission Hyperkalemia, due to missing dialysis follow-up with low K bath, low K diet  Medication management given including low potassium binder  Follow-up closely  Refer to the orders    High AG metabolic acidosis: Not controlled, mild   Follow-up with dialysis    4. Anemia of renal failure: at goal  - Iron: or - RIA: not needed   with HD    5. BMD:  - Phos, calcium - follow in morning labs-     - follow iPTH outpt      Other hx---  4.  History of pericardial effusion  5.  History of CVA  6.  History of systolic CHF-volume removal as 
  MD Isaac Jeong MD Aldo Estella,               Office: [phone]                      Fax: [phone]               [employer]                     Nephrology consult received. Full consult report will follow.     ESRD on iHD: MWF.   - outpt HD center: [facility] with us  - Access: Hemodialysis-AV Fistula-Standard, Right Upper Arm, Other/Unknown  Access Placed on February 11, 2022     Recent admission after missing dialysis for 1 week  Was discharged on 7-    Due for HD today, I arranged HD today      Thank you for allowing us to participate in this patient's care. Please do not hesitate to contact us anytime. We will follow along with you.       Isaac Coon MD  [facility]   [phone] or [address].    
toxic.  MRI brain EEG to exclude central cause  Remote left hemispheric CVA with residual deficit  Hypertension, not controlled  End-stage renal disease    MRI brain  EEG  Metabolic workup  PT and OT  Speech  Aspirin 81 mg daily  Sliding scale  Blood sugar control  Consider statin  Hold blood pressure Norvasc if blood pressure below 140  Continue dialysis  Will follow    Electronically signed by Shane Carrasco MD on 7/17/25 at 4:25 PM EDT     This dictation was generated by voice recognition computer software. Although all attempts are made to edit the dictation for accuracy, there may be errors in the  transcription that are not intended

## 2025-07-17 NOTE — PROGRESS NOTES
[facility] - Inpatient Rehabilitation Department   Phone: [phone]    Occupational Therapy    [x] Initial Evaluation            [] Daily Treatment Note         [] Discharge Summary      Patient: Kristine Ramirez   : 1975   MRN: [id number]   Date of Service:  2025    Admitting Diagnosis:  Generalized weakness  Current Admission Summary: 50 y.o. female who presents from home via EMS for weakness. Patient tells me that she was recently with a hospital for dialysis. Last got dialysis on . Since then she has generalized weakness. She denies any pain. Tells me she has been unable to get around at home due to the overall weakness. Denies any fevers. She is still eating and drinking. No nausea or vomiting.     Past Medical History:  has a past medical history of Acute respiratory failure due to COVID-19 ([facility]), Arterial ischemic stroke, ICA, left, acute ([facility]), Blind in both eyes, Cerebral artery occlusion with cerebral infarction ([facility]), CHF (congestive heart failure) ([facility]), Chronic kidney disease, COPD (chronic obstructive pulmonary disease) ([facility]), Depression, Diabetes mellitus out of control, Diabetes mellitus, type II ([facility]), Diabetic neuropathy associated with type 2 diabetes mellitus ([facility]), Generalized headaches, Hypertension, Infertility, Insomnia, Migraine headache, Mixed hyperlipidemia, Otitis media, Pelvic abscess in female, Pneumonia, Stroke ([facility]), and Stroke ([facility]).  Past Surgical History:  has a past surgical history that includes Cervix surgery; eye surgery; Foot surgery (Right); Foot surgery (Bilateral); Foot surgery (Left); IR TUNNELED CVC PLACE WO SQ PORT/PUMP > 5 YEARS (2021); Dialysis fistula creation (Right, 2/10/2022); Upper gastrointestinal endoscopy (N/A, 2024); Upper gastrointestinal endoscopy (N/A, 2024); Cardiac procedure (N/A, 3/18/2025); and IR TUNNELED CVC PLACE WO SQ PORT/PUMP > 5 YEARS (2025).    Discharge Recommendations: Kristine Barba

## 2025-07-17 NOTE — PROCEDURES
Patient: Kristine Ramirez    MR Number: [id number]  YOB: 1975  Date of Visit: 7/17/2025    Clinical History:  The patient is a 50 y.o. years old female with acute encephalopathy and recurrent confusion.      Method:  The EEG was performed utilizing the international 10/20 of electrode placements of both referential and bipolar montages. The patient was awake and drowsy through out the recording.  Photic stimulation was performed.    Findings:  The background of the EEG showed normal alpha posterior background of 8- HZ and amplitude of 20-40 UV. This background was symmetric, waxing and waning, and reactive with eye opening and closure. As the patient became drowsy, generalized diffuse slowing was seen through recording at 6-7 HZ.  This generalized slowing was symmetric, non rhythmical, and continuous. No spike or sharp waves were seen.  Photic stimulation did not activate EEG.     Impression:  This EEG  is within normal limits. There is no evidence of epileptiform discharges, focal, or lateralizing abnormalities.      Shane Alcala MD      Board certified in clinical neurophysiology

## 2025-07-17 NOTE — PROGRESS NOTES
MD Isaac Jeong MD Aldo Estella, DO              Office: [phone]                      Fax: [phone]          NEPHROLOGY INPATIENT PROGRESS NOTE:-      PATIENT NAME: Kristine Ramirez  : 1975  MRN: [id number]      IMPRESSION / RECOMMENDATION:      Admitted on:  2025  For:    Hospital Problems           Last Modified POA    * (Principal) Generalized weakness 2025 Yes       ESRD on iHD: MWF.   - outpt HD center: [facility] with   - Access: Hemodialysis-AV Fistula-Standard, Right Upper Arm, Other/Unknown  Access Placed on 2022      Recent admission after missing dialysis for 1 week  Encouraged compliance due to potentially life-threatening issue    Was discharged on 2025     S/P HD on Wednesday, with 1 L, post HD weight 63.7 kg  Next HD on Friday  Renally stable        2. Hypertension/Volume Status:  - BP hypertension, slightly uncontrolled continue current plan, okay to keep slightly higher-to give more room for fluid removal with dialysis:   - Na natremia mild chronic improving, follow with dialysis  - EDW 63.5 kg : Above dry weight,  - fluid removal to DW as tolerated    3. Electrolytes/acid-base:  K: WN now   Last admission Hyperkalemia, due to missing dialysis follow-up with low K bath, low K diet  Medication management given including low potassium binder  Follow-up closely  Refer to the orders    High AG metabolic acidosis: Not controlled, mild   Follow-up with dialysis    4. Anemia of renal failure: at goal  - Iron: or - RIA: not needed   with HD    5. BMD:  - Phos, calcium - follow in morning labs-     - follow iPTH outpt      Other hx---  4.  History of pericardial effusion  5.  History of CVA  6.  History of systolic CHF-volume removal as tolerated with HD        : Other supportive care :   - Check daily renal function panel with electrolytes-phosphorus  - Strict monitoring of I/Os, daily weight  - Renal  times per day    insulin lispro  0-4 Units SubCUTAneous 4x Daily AC & HS     Continuous Infusions:   dextrose       PRN Meds:.ondansetron, albuterol sulfate HFA, dextrose bolus **OR** dextrose bolus, glucagon (rDNA), dextrose        DATA:  Diagnostic tests reviewed for today's visit, as needed for my evaluation.     Recent Labs     07/16/25 0137 07/17/25  0521   WBC 6.4 5.9   HCT 33.1* 34.5*    129*     Iron Saturation:  No components found for: \"PERCENTFE\"  FERRITIN:    Lab Results   Component Value Date/Time    FERRITIN 112.0 08/28/2021 04:20 AM     IRON:    Lab Results   Component Value Date/Time    IRON 36 01/04/2022 01:09 PM     TIBC:    Lab Results   Component Value Date/Time    TIBC 299 01/04/2022 01:09 PM       Recent Labs     07/16/25 0137 07/17/25  0521    139   K 4.9 4.8   CL 99 103   CO2 20* 20*   BUN 47* 32*   CREATININE 5.5* 4.0*     Recent Labs     07/16/25 0137 07/17/25  0521   CALCIUM 9.0 9.2     No results for input(s): \"PH\", \"PCO2\", \"PO2\" in the last 72 hours.    Invalid input(s): \"[id number]\", \"INSPIREDO2\"           BELOW MENTIONED RADIOLOGY STUDY RESULTS REVIEWED BY ME:  as needed for my evaluation.   XR CHEST PORTABLE  Result Date: 7/16/2025  EXAMINATION: ONE XRAY VIEW OF THE CHEST 7/16/2025 1:42 am COMPARISON: 07/10/2025 HISTORY: ORDERING SYSTEM PROVIDED HISTORY: Weak TECHNOLOGIST PROVIDED HISTORY: Reason for exam:->Weak Reason for Exam: weak FINDINGS: Cardiac silhouette enlargement again noted.  Tunneled left internal jugular dialysis catheter remains in place.  Right subclavian stent again noted. Vascular congestion is similar in appearance.  Improved appearance of the left lung base with at least small left pleural effusion again noted.  Trace right effusion.  No pneumothorax appreciated.  No acute osseous abnormality identified.     1. Improved appearance of the left lung base with at least small left pleural effusion again noted. Trace right effusion. 2. Vascular

## 2025-07-17 NOTE — PROGRESS NOTES
Hospitalist Progress Note      Name:  Kristine Ramirez /Age/Sex: 1975  (50 y.o. female)   MRN & CSN:  [id number] & [id number] Encounter Date/Time: 2025 9:47 AM EDT   Location:  5TN-5561/5561-01 PCP: Damon Mireles MD     Attending:Jourdan Steve MD       Hospital Day: 2    Subjective:   Chief Complaint:   Chief Complaint   Patient presents with    Fatigue     Pt via [facility] ems w/ cc of generalized weakness. Missed Dialysis today.      Kristine Ramirez is a 50 y.o. female with a past medical history of hypertension, hyperlipidemia, DM2, CHF, CKD, CVA residual right sided weakness, blindness, migraine who presented with fatigue. Recent admission AMS, fluid overload 2025 and again 2025 and treated for multifocal PNA. Presented with sudden weakness after missing HD Monday.      Interval History:  Today, she is resting in bed.  Denies CP or SOB.  No swelling or abd pain.       Independently reviewed interval ancillary notes from nephrology.     Assessment and Recommendations   Problem List  Principal Problem:    Generalized weakness  Resolved Problems:    * No resolved hospital problems. *     Assessment and Plan:    Generalized weakness  Ambulatory dysfunction   - Appear metabolic, missed HD Monday, she is better today   - Right>left lower extremity weakness (noted on PT OT exam notes on recent admission)  - Consult neurology - discussed with team, no acute symptoms, however given encephalopathy will obtain MRI brain and EEG     Recent admission for pneumonia discharged 2025  Was on IV Rocephin and azithromycin inpatient but not provided with oral antibiotics on discharge.  Clinically and radiographically she has improved  No indication for abx therapy at this time     Chronically elevated troponin  - EKG personally reviewed normal sinus rhythm 91/min no acute ST-T wave changes  - Continue aspirin     ESRD on HD MWF  - Left subclavian tunneled HD catheter   -  PM     Organism:   Lab Results   Component Value Date/Time    ORG Staphylococcus epidermidis 01/15/2022 04:55 AM     Personally reviewed labs, diagnostic, device, and imaging results reviewed as a part of this visit    Electronically signed by RIVERA Pineda CNP on 7/17/2025 at 8:11 AM

## 2025-07-17 NOTE — PLAN OF CARE
Problem: Chronic Conditions and Co-morbidities  Goal: Patient's chronic conditions and co-morbidity symptoms are monitored and maintained or improved  Outcome: Progressing     Problem: Pain  Goal: Verbalizes/displays adequate comfort level or baseline comfort level  Outcome: Progressing     Problem: Safety - Adult  Goal: Free from fall injury  Outcome: Progressing     Problem: Neurosensory - Adult  Goal: Achieves stable or improved neurological status  Outcome: Progressing  Goal: Absence of seizures  Outcome: Progressing  Goal: Achieves maximal functionality and self care  Outcome: Progressing     Problem: Skin/Tissue Integrity - Adult  Goal: Skin integrity remains intact  Description: 1.  Monitor for areas of redness and/or skin breakdown  2.  Assess vascular access sites hourly  3.  Every 4-6 hours minimum:  Change oxygen saturation probe site  4.  Every 4-6 hours:  If on nasal continuous positive airway pressure, respiratory therapy assess nares and determine need for appliance change or resting period  Outcome: Progressing  Goal: Incisions, wounds, or drain sites healing without S/S of infection  Outcome: Progressing  Goal: Oral mucous membranes remain intact  Outcome: Progressing     Problem: Musculoskeletal - Adult  Goal: Return mobility to safest level of function  Outcome: Progressing  Goal: Maintain proper alignment of affected body part  Outcome: Progressing  Goal: Return ADL status to a safe level of function  Outcome: Progressing     Problem: Genitourinary - Adult  Goal: Absence of urinary retention  Outcome: Progressing     Problem: Skin/Tissue Integrity  Goal: Skin integrity remains intact  Description: 1.  Monitor for areas of redness and/or skin breakdown  2.  Assess vascular access sites hourly  3.  Every 4-6 hours minimum:  Change oxygen saturation probe site  4.  Every 4-6 hours:  If on nasal continuous positive airway pressure, respiratory therapy assess nares and determine need for appliance

## 2025-07-18 VITALS
HEART RATE: 86 BPM | RESPIRATION RATE: 18 BRPM | BODY MASS INDEX: 22.04 KG/M2 | WEIGHT: 140.43 LBS | OXYGEN SATURATION: 91 % | TEMPERATURE: 98.6 F | SYSTOLIC BLOOD PRESSURE: 162 MMHG | HEIGHT: 67 IN | DIASTOLIC BLOOD PRESSURE: 79 MMHG

## 2025-07-18 LAB
ALBUMIN SERPL-MCNC: 3.7 G/DL (ref 3.4–5)
ALP SERPL-CCNC: 145 U/L (ref 40–129)
ALT SERPL-CCNC: 71 U/L (ref 10–40)
ANION GAP SERPL CALCULATED.3IONS-SCNC: 14 MMOL/L (ref 3–16)
AST SERPL-CCNC: 33 U/L (ref 15–37)
BASOPHILS # BLD: 0 K/UL (ref 0–0.2)
BASOPHILS NFR BLD: 0.8 %
BILIRUB DIRECT SERPL-MCNC: 0.2 MG/DL (ref 0–0.3)
BILIRUB INDIRECT SERPL-MCNC: 0.5 MG/DL (ref 0–1)
BILIRUB SERPL-MCNC: 0.7 MG/DL (ref 0–1)
BUN SERPL-MCNC: 19 MG/DL (ref 7–20)
CALCIUM SERPL-MCNC: 9.3 MG/DL (ref 8.3–10.6)
CHLORIDE SERPL-SCNC: 107 MMOL/L (ref 99–110)
CO2 SERPL-SCNC: 20 MMOL/L (ref 21–32)
CREAT SERPL-MCNC: 2.9 MG/DL (ref 0.6–1.1)
DEPRECATED RDW RBC AUTO: 18.5 % (ref 12.4–15.4)
EOSINOPHIL # BLD: 0.2 K/UL (ref 0–0.6)
EOSINOPHIL NFR BLD: 3.9 %
FOLATE SERPL-MCNC: 5.67 NG/ML (ref 4.78–24.2)
GFR SERPLBLD CREATININE-BSD FMLA CKD-EPI: 19 ML/MIN/{1.73_M2}
GLUCOSE BLD-MCNC: 120 MG/DL (ref 70–99)
GLUCOSE SERPL-MCNC: 148 MG/DL (ref 70–99)
HCT VFR BLD AUTO: 39.1 % (ref 36–48)
HGB BLD-MCNC: 12.4 G/DL (ref 12–16)
LYMPHOCYTES # BLD: 0.6 K/UL (ref 1–5.1)
LYMPHOCYTES NFR BLD: 11.1 %
MAGNESIUM SERPL-MCNC: 2.04 MG/DL (ref 1.8–2.4)
MCH RBC QN AUTO: 29.5 PG (ref 26–34)
MCHC RBC AUTO-ENTMCNC: 31.8 G/DL (ref 31–36)
MCV RBC AUTO: 92.9 FL (ref 80–100)
MONOCYTES # BLD: 0.6 K/UL (ref 0–1.3)
MONOCYTES NFR BLD: 9.7 %
NEUTROPHILS # BLD: 4.4 K/UL (ref 1.7–7.7)
NEUTROPHILS NFR BLD: 74.5 %
PERFORMED ON: ABNORMAL
PLATELET # BLD AUTO: ABNORMAL K/UL (ref 135–450)
PMV BLD AUTO: ABNORMAL FL (ref 5–10.5)
POTASSIUM SERPL-SCNC: 4.4 MMOL/L (ref 3.5–5.1)
PROT SERPL-MCNC: 7.2 G/DL (ref 6.4–8.2)
RBC # BLD AUTO: 4.21 M/UL (ref 4–5.2)
SLIDE REVIEW: ABNORMAL
SODIUM SERPL-SCNC: 141 MMOL/L (ref 136–145)
TSH SERPL DL<=0.005 MIU/L-ACNC: 2.93 UIU/ML (ref 0.27–4.2)
VIT B12 SERPL-MCNC: 704 PG/ML (ref 211–911)
WBC # BLD AUTO: 5.8 K/UL (ref 4–11)

## 2025-07-18 PROCEDURE — 83735 ASSAY OF MAGNESIUM: CPT

## 2025-07-18 PROCEDURE — G0378 HOSPITAL OBSERVATION PER HR: HCPCS

## 2025-07-18 PROCEDURE — 97535 SELF CARE MNGMENT TRAINING: CPT

## 2025-07-18 PROCEDURE — 82607 VITAMIN B-12: CPT

## 2025-07-18 PROCEDURE — 82746 ASSAY OF FOLIC ACID SERUM: CPT

## 2025-07-18 PROCEDURE — 84443 ASSAY THYROID STIM HORMONE: CPT

## 2025-07-18 PROCEDURE — 97530 THERAPEUTIC ACTIVITIES: CPT

## 2025-07-18 PROCEDURE — 36415 COLL VENOUS BLD VENIPUNCTURE: CPT

## 2025-07-18 PROCEDURE — 80048 BASIC METABOLIC PNL TOTAL CA: CPT

## 2025-07-18 PROCEDURE — 6370000000 HC RX 637 (ALT 250 FOR IP): Performed by: STUDENT IN AN ORGANIZED HEALTH CARE EDUCATION/TRAINING PROGRAM

## 2025-07-18 PROCEDURE — APPNB30 APP NON BILLABLE TIME 0-30 MINS

## 2025-07-18 PROCEDURE — 6360000002 HC RX W HCPCS: Performed by: STUDENT IN AN ORGANIZED HEALTH CARE EDUCATION/TRAINING PROGRAM

## 2025-07-18 PROCEDURE — 85025 COMPLETE CBC W/AUTO DIFF WBC: CPT

## 2025-07-18 PROCEDURE — 80076 HEPATIC FUNCTION PANEL: CPT

## 2025-07-18 PROCEDURE — 96372 THER/PROPH/DIAG INJ SC/IM: CPT

## 2025-07-18 PROCEDURE — 6370000000 HC RX 637 (ALT 250 FOR IP): Performed by: NURSE PRACTITIONER

## 2025-07-18 PROCEDURE — APPSS30 APP SPLIT SHARED TIME 16-30 MINUTES

## 2025-07-18 PROCEDURE — 90935 HEMODIALYSIS ONE EVALUATION: CPT

## 2025-07-18 RX ORDER — AMLODIPINE BESYLATE 5 MG/1
5 TABLET ORAL DAILY
Status: DISCONTINUED | OUTPATIENT
Start: 2025-07-18 | End: 2025-07-18 | Stop reason: HOSPADM

## 2025-07-18 RX ORDER — HEPARIN SODIUM 1000 [USP'U]/ML
3600 INJECTION, SOLUTION INTRAVENOUS; SUBCUTANEOUS PRN
Status: DISCONTINUED | OUTPATIENT
Start: 2025-07-18 | End: 2025-07-18 | Stop reason: HOSPADM

## 2025-07-18 RX ADMIN — HEPARIN SODIUM 5000 UNITS: 5000 INJECTION, SOLUTION INTRAVENOUS; SUBCUTANEOUS at 14:46

## 2025-07-18 RX ADMIN — HYDRALAZINE HYDROCHLORIDE 50 MG: 25 TABLET ORAL at 06:28

## 2025-07-18 RX ADMIN — SEVELAMER CARBONATE FOR ORAL SUSPENSION 2.4 G: 800 POWDER, FOR SUSPENSION ORAL at 11:55

## 2025-07-18 RX ADMIN — CLONIDINE HYDROCHLORIDE 0.1 MG: 0.1 TABLET ORAL at 14:45

## 2025-07-18 RX ADMIN — HYDRALAZINE HYDROCHLORIDE 50 MG: 25 TABLET ORAL at 14:45

## 2025-07-18 RX ADMIN — HEPARIN SODIUM 5000 UNITS: 5000 INJECTION, SOLUTION INTRAVENOUS; SUBCUTANEOUS at 06:27

## 2025-07-18 RX ADMIN — AMLODIPINE BESYLATE 5 MG: 5 TABLET ORAL at 11:55

## 2025-07-18 RX ADMIN — PANTOPRAZOLE SODIUM 40 MG: 40 TABLET, DELAYED RELEASE ORAL at 06:27

## 2025-07-18 NOTE — DISCHARGE INSTR - COC
Continuity of Care Form    Patient Name: Kristine Ramirez   :  1975  MRN:  [id number]    Admit date:  2025  Discharge date:  ***    Code Status Order: Full Code   Advance Directives:     Admitting Physician:  Merrick Robertson MD  PCP: Damon Mireles MD    Discharging Nurse: ***  Discharging Hospital Unit/Room#: 5TN-5561/5561-01  Discharging Unit Phone Number: ***    Emergency Contact:   Extended Emergency Contact Information  Primary Emergency Contact: Kannan Ramirez  Home Phone: [phone]  Mobile Phone: [phone]  Relation: Spouse    Past Surgical History:  Past Surgical History:   Procedure Laterality Date    CARDIAC PROCEDURE N/A 3/18/2025    Pericardiocentesis performed by Bartolo Hawley MD at [facility] CARDIAC CATH LAB    CERVIX SURGERY      laser tx for dysplasia;    DIALYSIS FISTULA CREATION Right 2/10/2022    RIGHT BRACHIO CEPHALIC FISTULA CREATION performed by Christiano Lomeli II, MD at [facility] OR    EYE SURGERY      FOOT SURGERY Right     FOOT SURGERY Bilateral     FOOT SURGERY Left     IR TUNNELED CVC PLACE WO SQ PORT/PUMP > 5 YEARS  2021    IR TUNNELED CATHETER PLACEMENT GREATER THAN 5 YEARS 2021 Omari Carson MD [facility] SPECIAL PROCEDURES    IR TUNNELED CVC PLACE WO SQ PORT/PUMP > 5 YEARS  2025    IR TUNNELED CVC PLACE WO SQ PORT/PUMP > 5 YEARS 2025 [facility] SPECIAL PROCEDURES    UPPER GASTROINTESTINAL ENDOSCOPY N/A 2024    ESOPHAGOGASTRODUODENOSCOPY performed by Joss Bernal MD at [facility] ENDOSCOPY    UPPER GASTROINTESTINAL ENDOSCOPY N/A 2024    ESOPHAGOGASTRODUODENOSCOPY BIOPSY performed by Kina Montelongo MD at [facility] ENDOSCOPY       Immunization History:   Immunization History   Administered Date(s) Administered    Hep B, HEPLISAV-B, (age 18y+), IM, 0.5mL 2021, 10/15/2021, 2021, 2022, 02/15/2023, 03/15/2023, 04/10/2023, 2023    Influenza 2011    Influenza Virus Vaccine 2011, 10/05/2013, 10/12/2022    Influenza,

## 2025-07-18 NOTE — CARE COORDINATION
Case Management Assessment  Initial Evaluation    Date/Time of Evaluation: 7/18/2025 3:22 PM  Assessment Completed by: Zahraa Garcia    If patient is discharged prior to next notation, then this note serves as note for discharge by case management.    Patient Name: Kristine Ramirez                   YOB: 1975  Diagnosis: Generalized weakness [R53.1]                   Date / Time: 7/16/2025  1:20 AM    Patient Admission Status: Observation   Readmission Risk (Low < 19, Mod (19-27), High > 27): Readmission Risk Score: 33.7    Current PCP: Damon Mireles MD  PCP verified by CM? Yes    Chart Reviewed: Yes      History Provided by: Patient  Patient Orientation: Alert and Oriented, Person, Place, Situation    Patient Cognition: Alert    Hospitalization in the last 30 days (Readmission):  Yes    If yes, Readmission Assessment in  Navigator will be completed.    Advance Directives:      Code Status: Full Code   Patient's Primary Decision Maker is: Legal Next of Kin    Primary Decision Maker: Kannan Ramirez - Spouse - [phone]    Discharge Planning:    Patient lives with: Spouse/Significant Other Type of Home: House  Primary Care Giver: Spouse  Patient Support Systems include: Spouse/Significant Other   Current Financial resources: Medicare  Current community resources: None  Current services prior to admission: None            Current DME:              Type of Home Care services:  None    ADLS  Prior functional level: Mobility, Shopping, Housework, Cooking, Assistance with the following:  Current functional level: Assistance with the following:, Mobility, Toileting, Dressing, Bathing    PT AM-PAC: 11 /24  OT AM-PAC: 12 /24    Family can provide assistance at DC: Yes  Would you like Case Management to discuss the discharge plan with any other family members/significant others, and if so, who? No  Plans to Return to Present Housing: Yes  Other Identified Issues/Barriers to RETURNING to current

## 2025-07-18 NOTE — PLAN OF CARE
Problem: Chronic Conditions and Co-morbidities  Goal: Patient's chronic conditions and co-morbidity symptoms are monitored and maintained or improved  Outcome: Completed     Problem: Pain  Goal: Verbalizes/displays adequate comfort level or baseline comfort level  Outcome: Completed     Problem: Safety - Adult  Goal: Free from fall injury  Outcome: Completed     Problem: Neurosensory - Adult  Goal: Achieves stable or improved neurological status  Outcome: Completed  Goal: Absence of seizures  Outcome: Completed  Goal: Achieves maximal functionality and self care  Outcome: Completed     Problem: Skin/Tissue Integrity - Adult  Goal: Skin integrity remains intact  Description: 1.  Monitor for areas of redness and/or skin breakdown  2.  Assess vascular access sites hourly  3.  Every 4-6 hours minimum:  Change oxygen saturation probe site  4.  Every 4-6 hours:  If on nasal continuous positive airway pressure, respiratory therapy assess nares and determine need for appliance change or resting period  Outcome: Completed  Goal: Incisions, wounds, or drain sites healing without S/S of infection  Outcome: Completed  Goal: Oral mucous membranes remain intact  Outcome: Completed     Problem: Musculoskeletal - Adult  Goal: Return mobility to safest level of function  Outcome: Completed  Goal: Maintain proper alignment of affected body part  Outcome: Completed  Goal: Return ADL status to a safe level of function  Outcome: Completed     Problem: Genitourinary - Adult  Goal: Absence of urinary retention  Outcome: Completed     Problem: Skin/Tissue Integrity  Goal: Skin integrity remains intact  Description: 1.  Monitor for areas of redness and/or skin breakdown  2.  Assess vascular access sites hourly  3.  Every 4-6 hours minimum:  Change oxygen saturation probe site  4.  Every 4-6 hours:  If on nasal continuous positive airway pressure, respiratory therapy assess nares and determine need for appliance change or resting

## 2025-07-18 NOTE — PROGRESS NOTES
Kristine Barba Yovanypallavioumar  Neurology Follow-up  [facility] Neurology    Date of Service: 7/18/2025    Subjective:   CC: Follow up today regarding: Acute encephalopathy and generalized weakness    Events noted. Chart and lab reviewed.  Patient seen this morning in dialysis suite.  We discussed MRI brain results which showed no acute intracranial abnormality.  EEG yesterday showed no epileptiform discharges.  She reports feeling better today she offers no new complaints.      ROS : A 10-12 system review obtained and updated today and is unremarkable except as mentioned  in my interval history.     Past medical history, social history, medication and family history reviewed.       Objective:  Exam:   Constitutional:   Vitals:    07/18/25 0011 07/18/25 0625 07/18/25 0738 07/18/25 1100   BP:  (!) 166/88 (!) 157/79 (!) 162/79   Pulse: 87 85 85 86   Resp:   18 18   Temp:   97.7 °F (36.5 °C) 98.6 °F (37 °C)   TempSrc:   Temporal Temporal   SpO2:  91%     Weight:       Height:         General appearance:  Normal development and appear in no acute distress.   Mental Status:   Oriented to person, place, problem  Memory: Good immediate recall.  Intact remote memory  Good attention span and concentration.  Language: intact naming, repeating and fluency   Good fund of Knowledge.   Cranial Nerves:   II:   Pupils: equal, round, reactive to light.  Left eye blindness  III,IV,VI: Extra Ocular Movements are intact. No nystagmus  V: Facial sensation is intact  VII: Facial strength and movements: intact and symmetric  XII: Tongue movements are normal  Musculoskeletal: Same chronic right-sided weakness, 4/5 compared to left.  Tone: Normal tone.   Reflexes: Symmetric 2+ in both arms and legs.  Coordination: no pronator drift, no dysmetria with FNF  Sensation: normal.  Gait/Posture: Deferred testing due to dialysis    MDM:      A. Problems (any 1)    High:    [x] Acute/Chronic Illness/injury posing threat to life or bodily function:

## 2025-07-18 NOTE — DISCHARGE SUMMARY
[facility]    Patient Demographics    Patient. Kristine Ramirez  Date of Birth. 1975  MRN. [id number]     Primary care provider. Damon Mireles MD  (Tel: [phone])    Admit date: 7/16/2025    Discharge date (blank if same as Note Date):   Note Date: 7/18/2025     Reason for Hospitalization.   Chief Complaint   Patient presents with    Fatigue     Pt via [facility] ems w/ cc of generalized weakness. Missed Dialysis today.        Significant Findings.   Principal Problem:    Generalized weakness  Active Problems:    Remote history of stroke    Cerebrovascular accident (CVA) (HCC)    Acute encephalopathy  Resolved Problems:    * No resolved hospital problems. *     Problem-based Hospital Course.  Kristine Ramirez is a 50 y.o. female with a past medical history of hypertension, hyperlipidemia, DM2, CHF, CKD, CVA residual right sided weakness, blindness, migraine who presented with fatigue. Recent admission AMS, fluid overload 6/2025 and again 7/14/2025 and treated for multifocal PNA. Presented with sudden weakness after missing HD Monday.  Received HD Wed and Friday.  Mentation completely back to baseline.  LFT improved, liver ultrasound OK (reviewed with attending, no further workup given lack of clinical symptoms).  Recommended for SNF, discusses safety concerns, she is A&O x4 exhibits good understanding, reports she has been to SNF before and they're not good places to be, lives with  and has support.  Discharged home with [facility].     Assessment and Plan:  Generalized weakness  Ambulatory dysfunction   Acute metabolic Encephalopathy  - Metabolic due to missed HD Monday   - Right>left lower extremity weakness (noted on PT OT exam notes on recent admission)  - Consult neurology - discussed with team, no acute symptoms, however given encephalopathy will obtain MRI brain and EEG   - PT/OT recommend SNF- patient declines, she is alert  Wheezing or Shortness of Breath     amLODIPine 5 MG tablet  Commonly known as: NORVASC     aspirin 81 MG EC tablet  Take 1 tablet by mouth daily     busPIRone 10 MG tablet  Commonly known as: BUSPAR  Take 1 tablet by mouth 2 times daily     cloNIDine 0.2 MG tablet  Commonly known as: CATAPRES  Take 1 tablet by mouth in the morning, at noon, and at bedtime     desvenlafaxine succinate 25 MG Tb24 extended release tablet  Commonly known as: PRISTIQ  Take 1 tablet by mouth daily     diclofenac sodium 1 % Gel  Commonly known as: VOLTAREN  Apply 2 g topically 2 times daily for 5 days     Dulaglutide 4.5 MG/0.5ML Soaj  Inject 4.5 mg into the skin every 7 days     Handicap Placard Misc  by Does not apply route Expires on 5/18/2028     hydrALAZINE 50 MG tablet  Commonly known as: APRESOLINE  Take 1 tablet by mouth every 8 hours     metoprolol 100 MG tablet  Commonly known as: LOPRESSOR  Take 1 tablet by mouth 2 times daily     pantoprazole 40 MG tablet  Commonly known as: PROTONIX  Take 1 tablet by mouth 2 times daily (before meals)     sevelamer 2.4 g Pack packet  Commonly known as: RENVELA     torsemide 20 MG tablet  Commonly known as: DEMADEX  Take 2 tablets by mouth daily            Spent 35 minutes in discharge process.    Signed:  RIVERA Pineda CNP     7/18/2025 3:40 PM

## 2025-07-18 NOTE — PLAN OF CARE
Problem: Chronic Conditions and Co-morbidities  Goal: Patient's chronic conditions and co-morbidity symptoms are monitored and maintained or improved  7/17/2025 2301 by Janette Sorto RN  Outcome: Progressing  7/17/2025 1529 by Ezio León RN  Outcome: Progressing     Problem: Pain  Goal: Verbalizes/displays adequate comfort level or baseline comfort level  7/17/2025 2301 by Janette Sorto RN  Outcome: Progressing  7/17/2025 1529 by Ezio León RN  Outcome: Progressing     Problem: Safety - Adult  Goal: Free from fall injury  7/17/2025 2301 by Janette Sorto RN  Outcome: Progressing  7/17/2025 1529 by Ezio León RN  Outcome: Progressing     Problem: Neurosensory - Adult  Goal: Achieves stable or improved neurological status  7/17/2025 2301 by Janette Sorto RN  Outcome: Progressing  7/17/2025 1529 by Ezio León RN  Outcome: Progressing  Goal: Absence of seizures  7/17/2025 2301 by Janette Sorto RN  Outcome: Progressing  7/17/2025 1529 by Ezio León RN  Outcome: Progressing  Goal: Achieves maximal functionality and self care  7/17/2025 2301 by Janette Sorto RN  Outcome: Progressing  7/17/2025 1529 by Ezio León RN  Outcome: Progressing     Problem: Skin/Tissue Integrity - Adult  Goal: Skin integrity remains intact  Description: 1.  Monitor for areas of redness and/or skin breakdown  2.  Assess vascular access sites hourly  3.  Every 4-6 hours minimum:  Change oxygen saturation probe site  4.  Every 4-6 hours:  If on nasal continuous positive airway pressure, respiratory therapy assess nares and determine need for appliance change or resting period  7/17/2025 2301 by Janette Sorto RN  Outcome: Progressing  7/17/2025 1529 by Ezio León RN  Outcome: Progressing  Goal: Incisions, wounds, or drain sites healing without S/S of infection  7/17/2025 2301 by Janette Sorto RN  Outcome: Progressing  7/17/2025 1529 by Ezio León RN  Outcome: Progressing  Goal: Oral mucous  membranes remain intact  7/17/2025 2301 by Janette Sorto RN  Outcome: Progressing  7/17/2025 1529 by Ezio León RN  Outcome: Progressing     Problem: Musculoskeletal - Adult  Goal: Return mobility to safest level of function  7/17/2025 2301 by Janette Sorto RN  Outcome: Progressing  7/17/2025 1529 by Ezio León RN  Outcome: Progressing  Goal: Maintain proper alignment of affected body part  7/17/2025 2301 by Janette Sorto RN  Outcome: Progressing  7/17/2025 1529 by Ezio León RN  Outcome: Progressing  Goal: Return ADL status to a safe level of function  7/17/2025 2301 by Janette Sorto RN  Outcome: Progressing  7/17/2025 1529 by Ezio León RN  Outcome: Progressing     Problem: Genitourinary - Adult  Goal: Absence of urinary retention  7/17/2025 2301 by Janette Sorto RN  Outcome: Progressing  7/17/2025 1529 by Ezio León RN  Outcome: Progressing     Problem: Skin/Tissue Integrity  Goal: Skin integrity remains intact  Description: 1.  Monitor for areas of redness and/or skin breakdown  2.  Assess vascular access sites hourly  3.  Every 4-6 hours minimum:  Change oxygen saturation probe site  4.  Every 4-6 hours:  If on nasal continuous positive airway pressure, respiratory therapy assess nares and determine need for appliance change or resting period  7/17/2025 2301 by Janette Sorto RN  Outcome: Progressing  7/17/2025 1529 by Ezio León RN  Outcome: Progressing

## 2025-07-18 NOTE — PROGRESS NOTES
Treatment time: 3 hrs    Net UF: 1000 ml    Pre weight: Unable to weight pt, bed scale not working    Access used: Lt CW TDC  Access function: Well tolerated, 350 BFR    Medications or blood products given: none    Regular outpatient schedule: MWF    Summary of response to treatment: Pt tolerated well. Pt remained stable throughout entire treatment and upon exiting the hemodialysis suite. Report given to Rosita Serrano RN

## 2025-07-18 NOTE — PROGRESS NOTES
RN discharge summary from [facility] to home.     This patient has had a discharge order placed. They are returning home and being picked up in the lobby by family. Discharge paperwork has been printed, highlighted, and gone over with the patient by this RN. Patient understands teaching and has no further questions at this time. IV has been removed with no complications. Telemetry has been removed. Pt has all belongings present.

## 2025-07-18 NOTE — CARE COORDINATION
07/18/25 1517   Readmission Assessment   Number of Days since last admission? 1-7 days   Previous Disposition Home with Family   Who is being Interviewed Patient   What was the patient's/caregiver's perception as to why they think they needed to return back to the hospital? Other (Comment)  (I missed HD and was nt feeling good)   Did you visit your Primary Care Physician after you left the hospital, before you returned this time? Yes  (via phone)   Did you see a specialist, such as Cardiac, Pulmonary, Orthopedic Physician, etc. after you left the hospital? No   Who advised the patient to return to the hospital? Self-referral   Does the patient report anything that got in the way of taking their medications? No   In our efforts to provide the best possible care to you and others like you, can you think of anything that we could have done to help you after you left the hospital the first time, so that you might not have needed to return so soon? Other (Comment)  (No)     Electronically signed by Zahraa Garcia on 7/18/25 at 3:18 PM EDT

## 2025-07-18 NOTE — PROGRESS NOTES
[facility] - Inpatient Rehabilitation Department   Phone: [phone]    Occupational Therapy    [] Initial Evaluation            [x] Daily Treatment Note         [] Discharge Summary      Patient: Kristine Ramirez   : 1975   MRN: [id number]   Date of Service:  2025    Admitting Diagnosis:  Generalized weakness  Current Admission Summary: 50 y.o. female who presents from home via EMS for weakness. Patient tells me that she was recently with a hospital for dialysis. Last got dialysis on . Since then she has generalized weakness. She denies any pain. Tells me she has been unable to get around at home due to the overall weakness. Denies any fevers. She is still eating and drinking. No nausea or vomiting.     Past Medical History:  has a past medical history of Acute respiratory failure due to COVID-19 ([facility]), Arterial ischemic stroke, ICA, left, acute ([facility]), Blind in both eyes, Cerebral artery occlusion with cerebral infarction ([facility]), CHF (congestive heart failure) ([facility]), Chronic kidney disease, COPD (chronic obstructive pulmonary disease) ([facility]), Depression, Diabetes mellitus out of control, Diabetes mellitus, type II ([facility]), Diabetic neuropathy associated with type 2 diabetes mellitus ([facility]), Generalized headaches, Hypertension, Infertility, Insomnia, Migraine headache, Mixed hyperlipidemia, Otitis media, Pelvic abscess in female, Pneumonia, Stroke ([facility]), and Stroke ([facility]).  Past Surgical History:  has a past surgical history that includes Cervix surgery; eye surgery; Foot surgery (Right); Foot surgery (Bilateral); Foot surgery (Left); IR TUNNELED CVC PLACE WO SQ PORT/PUMP > 5 YEARS (2021); Dialysis fistula creation (Right, 2/10/2022); Upper gastrointestinal endoscopy (N/A, 2024); Upper gastrointestinal endoscopy (N/A, 2024); Cardiac procedure (N/A, 3/18/2025); and IR TUNNELED CVC PLACE WO SQ PORT/PUMP > 5 YEARS (2025).    Discharge Recommendations: Kristine Barba  completed on this date secondary to global weakness and safety .  Balance:  Static Sitting Balance: fair: maintains balance at CGA without use of UE support  Dynamic Sitting Balance: poor (+): requires min (A) to maintain balance  Static Standing Balance: poor: requires mod (A) to maintain balance  Comments:    Other Therapeutic Interventions  Provided education regarding impact of current level of assistance on safety at home and recommendation for SNF. Patient states she has a wheelchair and will have two family members helping her get into her home. Encouraged use of wheelchair for safety.    Functional Outcomes                                       Cognition  Overall Cognitive Status: Impaired  Arousal/Alertness: delayed responses to stimuli  Following Commands: follows one step commands with repetition, follows one step commands with increased time  Attention Span: attends with cues to redirect  Safety Judgement: decreased awareness of need for assistance, decreased awareness of need for safety  Problem Solving: assistance required to generate solutions, assistance required to identify errors made  Initiation: requires cues for some  Sequencing: requires cues for some  Comments: Pt mildly lethargic and with flat affect   Orientation:    alert and oriented x 4  Command Following:   accurately follows one step commands     Education  Barriers To Learning: cognition and visual  Patient Education: patient educated on goals, OT role and benefits, plan of care, transfer training, discharge recommendations  Learning Assessment:  patient verbalizes understanding, would benefit from continued reinforcement    Assessment  Activity Tolerance: Limited by endurance and global weakness  Impairments Requiring Therapeutic Intervention: decreased functional mobility, decreased ADL status, decreased ROM, decreased strength, decreased safety awareness, decreased cognition, decreased endurance, decreased balance, decreased

## 2025-07-18 NOTE — PROGRESS NOTES
MD Isaac Jeong MD Aldo Estella, DO              Office: [phone]                      Fax: [phone]          NEPHROLOGY INPATIENT PROGRESS NOTE:-      PATIENT NAME: Kristine Ramirez  : 1975  MRN: [id number]      IMPRESSION / RECOMMENDATION:      Admitted on:  2025  For:    Hospital Problems           Last Modified POA    * (Principal) Generalized weakness 2025 Yes    Remote history of stroke 2025 Yes    Cerebrovascular accident (CVA) (HCC) 2025 Yes    Acute encephalopathy 2025 Yes       ESRD on iHD: MWF.   - outpt HD center: [facility] with   - Access: Hemodialysis-AV Fistula-Standard, Right Upper Arm, Other/Unknown  Access Placed on 2022      Recent admission after missing dialysis for 1 week  Encouraged compliance due to potentially life-threatening issue    Was discharged on 2025     S/P HD on Wednesday, with 1 L, post HD weight 63.7 kg  Next HD on Friday   - Seen on HD today, tolerating about 1 kg UF        2. Hypertension/Volume Status:  - BP hypertension, slightly uncontrolled continue current plan, okay to keep slightly higher-to give more room for fluid removal with dialysis:   - Na natremia mild chronic improving, follow with dialysis  - EDW 63.5 kg : Above dry weight,  - fluid removal to DW as tolerated    3. Electrolytes/acid-base:  K: WN now   Last admission Hyperkalemia, due to missing dialysis follow-up with low K bath, low K diet  Medication management given including low potassium binder  Follow-up closely  Refer to the orders    High AG metabolic acidosis: Not controlled, mild   Follow-up with dialysis    4. Anemia of renal failure: at goal  - Iron: or - RIA: not needed   with HD    5. BMD:  - Phos, calcium - follow in morning labs-     - follow iPTH outpt      Other hx---  4.  History of pericardial effusion  5.  History of CVA  6.  History of systolic CHF-volume removal as tolerated with  cirrhosis. 2. Cholelithiasis without evidence of acute cholecystitis. 3. Right upper quadrant ascites and bilateral pleural effusions. 4. Advanced chronic kidney disease.     XR CHEST PORTABLE  Result Date: 7/16/2025  EXAMINATION: ONE XRAY VIEW OF THE CHEST 7/16/2025 1:42 am COMPARISON: 07/10/2025 HISTORY: ORDERING SYSTEM PROVIDED HISTORY: Weak TECHNOLOGIST PROVIDED HISTORY: Reason for exam:->Weak Reason for Exam: weak FINDINGS: Cardiac silhouette enlargement again noted.  Tunneled left internal jugular dialysis catheter remains in place.  Right subclavian stent again noted. Vascular congestion is similar in appearance.  Improved appearance of the left lung base with at least small left pleural effusion again noted.  Trace right effusion.  No pneumothorax appreciated.  No acute osseous abnormality identified.     1. Improved appearance of the left lung base with at least small left pleural effusion again noted. Trace right effusion. 2. Vascular congestion is similar in appearance.     XR CHEST PORTABLE  Result Date: 7/11/2025  EXAMINATION: ONE XRAY VIEW OF THE CHEST 7/10/2025 10:49 pm COMPARISON: 06/18/2025 HISTORY: ORDERING SYSTEM PROVIDED HISTORY: Malaise, PNA? TECHNOLOGIST PROVIDED HISTORY: Reason for exam:->Malaise, PNA? FINDINGS: There is a tunneled dialysis catheter.  Cardiac enlargement.  The patient is rotated to the left.  Aortic atherosclerosis.  Vascular stent seen overlying the region of the subclavian vein.  There is a moderate size left-sided pleural effusion as well as diffuse parenchymal airspace disease.  Mild vascular congestion.  No acute osseous abnormality is identified.     1. Moderate left-sided pleural effusion with associated parenchymal airspace disease throughout the left lung concerning for multifocal pneumonia. 2. Mild vascular congestion.           ======================================================================  Please note that this chart entry has been generated using voice

## 2025-07-18 NOTE — PROGRESS NOTES
[facility] - Inpatient Rehabilitation Department   Phone: [phone]    Physical Therapy    [] Initial Evaluation            [x] Daily Treatment Note         [] Discharge Summary      Patient: Kristine Ramirez   : 1975   MRN: [id number]   Date of Service:  2025  Admitting Diagnosis: Generalized weakness  Current Admission Summary: 50 y.o. female who presents from home via EMS for weakness.  Patient tells me that she was recently with a hospital for dialysis.  Last got dialysis on .  Since then she has generalized weakness.  She denies any pain.  Tells me she has been unable to get around at home due to the overall weakness.  Denies any fevers.  She is still eating and drinking.  No nausea or vomiting.   Past Medical History:  has a past medical history of Acute respiratory failure due to COVID-19 ([facility]), Arterial ischemic stroke, ICA, left, acute ([facility]), Blind in both eyes, Cerebral artery occlusion with cerebral infarction ([facility]), CHF (congestive heart failure) ([facility]), Chronic kidney disease, COPD (chronic obstructive pulmonary disease) ([facility]), Depression, Diabetes mellitus out of control, Diabetes mellitus, type II ([facility]), Diabetic neuropathy associated with type 2 diabetes mellitus ([facility]), Generalized headaches, Hypertension, Infertility, Insomnia, Migraine headache, Mixed hyperlipidemia, Otitis media, Pelvic abscess in female, Pneumonia, Stroke ([facility]), and Stroke ([facility]).  Past Surgical History:  has a past surgical history that includes Cervix surgery; eye surgery; Foot surgery (Right); Foot surgery (Bilateral); Foot surgery (Left); IR TUNNELED CVC PLACE WO SQ PORT/PUMP > 5 YEARS (2021); Dialysis fistula creation (Right, 2/10/2022); Upper gastrointestinal endoscopy (N/A, 2024); Upper gastrointestinal endoscopy (N/A, 2024); Cardiac procedure (N/A, 3/18/2025); and IR TUNNELED CVC PLACE WO SQ PORT/PUMP > 5 YEARS (2025).  Discharge Recommendations: Kristine Ramirez

## 2025-07-21 ENCOUNTER — TELEPHONE (OUTPATIENT)
Dept: FAMILY MEDICINE CLINIC | Age: 50
End: 2025-07-21

## 2025-07-21 NOTE — TELEPHONE ENCOUNTER
Care Transitions Initial Follow Up Call    Outreach made within 2 business days of discharge: Yes    Patient: Kristine Ramirez Patient : 1975   MRN: [id number]  Reason for Admission: Fatigue   Discharge Date: 25       Spoke with: voicemail full unable to leave message    Discharge department/facility: [facility]      Follow Up  Future Appointments   Date Time Provider Department Center   2025  9:45 AM Radha Brennan MD FF [facility]   2025 11:00 AM Damon Mireles MD [facility] DEP       Alvina Hui LPN

## 2025-07-22 ENCOUNTER — TELEPHONE (OUTPATIENT)
Dept: FAMILY MEDICINE CLINIC | Age: 50
End: 2025-07-22

## 2025-07-22 NOTE — TELEPHONE ENCOUNTER
Care Transitions Initial Follow Up Call    Outreach made within 2 business days of discharge: Yes    Patient: Kristine Ramirez Patient : 1975   MRN: [id number]  Reason for Admission:     Fatigue     Discharge Date: 25       Spoke with: Patient    Discharge department/facility: [facility] Interactive Patient Contact:  Was patient able to fill all prescriptions: Yes  Was patient instructed to bring all medications to the follow-up visit: Yes  Is patient taking all medications as directed in the discharge summary? Yes  Does patient understand their discharge instructions: Yes  Does patient have questions or concerns that need addressed prior to 7-14 day follow up office visit: yes -     Additional needs identified to be addressed with provider               Scheduled appointment with PCP within 7-14 days    Follow Up  Future Appointments   Date Time Provider Department Center   2025  9:45 AM Radha Brennan MD FF [facility] SUR[facility]   2025 11:00 AM Damon Mireles MD [facility] DEP       Alvina Hui LPN

## 2025-07-25 ENCOUNTER — APPOINTMENT (OUTPATIENT)
Dept: GENERAL RADIOLOGY | Age: 50
End: 2025-07-25
Payer: COMMERCIAL

## 2025-07-25 ENCOUNTER — APPOINTMENT (OUTPATIENT)
Dept: CT IMAGING | Age: 50
End: 2025-07-25
Attending: EMERGENCY MEDICINE
Payer: COMMERCIAL

## 2025-07-25 ENCOUNTER — HOSPITAL ENCOUNTER (OUTPATIENT)
Age: 50
Setting detail: OBSERVATION
Discharge: HOME | End: 2025-07-26
Attending: EMERGENCY MEDICINE | Admitting: HOSPITALIST
Payer: COMMERCIAL

## 2025-07-25 DIAGNOSIS — Z99.2 TYPE 2 DM WITH HYPERTENSION AND ESRD ON DIALYSIS (HCC): ICD-10-CM

## 2025-07-25 DIAGNOSIS — Z99.2 ACUTE RENAL FAILURE SUPERIMPOSED ON CHRONIC KIDNEY DISEASE, ON CHRONIC DIALYSIS, UNSPECIFIED ACUTE RENAL FAILURE TYPE (HCC): ICD-10-CM
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
## 2025-07-25 DIAGNOSIS — S80.02XA CONTUSION OF LEFT KNEE, INITIAL ENCOUNTER: ICD-10-CM

## 2025-07-25 DIAGNOSIS — N18.6 TYPE 2 DM WITH HYPERTENSION AND ESRD ON DIALYSIS (HCC): ICD-10-CM

## 2025-07-25 DIAGNOSIS — E11.22 TYPE 2 DM WITH HYPERTENSION AND ESRD ON DIALYSIS (HCC): ICD-10-CM

## 2025-07-25 DIAGNOSIS — L03.115 CELLULITIS OF RIGHT LOWER EXTREMITY: ICD-10-CM

## 2025-07-25 DIAGNOSIS — S91.209A NAIL AVULSION OF TOE, INITIAL ENCOUNTER: Primary | ICD-10-CM

## 2025-07-25 DIAGNOSIS — N18.6 ACUTE RENAL FAILURE SUPERIMPOSED ON CHRONIC KIDNEY DISEASE, ON CHRONIC DIALYSIS, UNSPECIFIED ACUTE RENAL FAILURE TYPE (HCC): ICD-10-CM

## 2025-07-25 DIAGNOSIS — I12.0 TYPE 2 DM WITH HYPERTENSION AND ESRD ON DIALYSIS (HCC): ICD-10-CM

## 2025-07-25 DIAGNOSIS — N17.9 ACUTE RENAL FAILURE SUPERIMPOSED ON CHRONIC KIDNEY DISEASE, ON CHRONIC DIALYSIS, UNSPECIFIED ACUTE RENAL FAILURE TYPE (HCC): ICD-10-CM

## 2025-07-25 DIAGNOSIS — S80.01XA CONTUSION OF RIGHT KNEE, INITIAL ENCOUNTER: ICD-10-CM

## 2025-07-25 LAB
ALBUMIN SERPL-MCNC: 3.8 G/DL (ref 3.4–5)
ALBUMIN/GLOB SERPL: 1.3 {RATIO} (ref 1.1–2.2)
ALP SERPL-CCNC: 152 U/L (ref 40–129)
ALT SERPL-CCNC: 12 U/L (ref 10–40)
ANION GAP SERPL CALCULATED.3IONS-SCNC: 16 MMOL/L (ref 3–16)
AST SERPL-CCNC: 14 U/L (ref 15–37)
BASOPHILS # BLD: 0.1 K/UL (ref 0–0.2)
BASOPHILS NFR BLD: 0.7 %
BILIRUB SERPL-MCNC: 0.7 MG/DL (ref 0–1)
BUN SERPL-MCNC: 59 MG/DL (ref 7–20)
CALCIUM SERPL-MCNC: 8.4 MG/DL (ref 8.3–10.6)
CHLORIDE SERPL-SCNC: 99 MMOL/L (ref 99–110)
CO2 SERPL-SCNC: 22 MMOL/L (ref 21–32)
CREAT SERPL-MCNC: 5.7 MG/DL (ref 0.6–1.1)
DEPRECATED RDW RBC AUTO: 18 % (ref 12.4–15.4)
EOSINOPHIL # BLD: 0.3 K/UL (ref 0–0.6)
EOSINOPHIL NFR BLD: 4.4 %
GFR SERPLBLD CREATININE-BSD FMLA CKD-EPI: 8 ML/MIN/{1.73_M2}
GLUCOSE SERPL-MCNC: 134 MG/DL (ref 70–99)
HCT VFR BLD AUTO: 33.9 % (ref 36–48)
HGB BLD-MCNC: 11.3 G/DL (ref 12–16)
LYMPHOCYTES # BLD: 1.4 K/UL (ref 1–5.1)
LYMPHOCYTES NFR BLD: 17.7 %
MCH RBC QN AUTO: 30 PG (ref 26–34)
MCHC RBC AUTO-ENTMCNC: 33.3 G/DL (ref 31–36)
MCV RBC AUTO: 90.1 FL (ref 80–100)
MONOCYTES # BLD: 0.9 K/UL (ref 0–1.3)
MONOCYTES NFR BLD: 10.9 %
NEUTROPHILS # BLD: 5.2 K/UL (ref 1.7–7.7)
NEUTROPHILS NFR BLD: 66.3 %
PLATELET # BLD AUTO: 173 K/UL (ref 135–450)
PMV BLD AUTO: 7.9 FL (ref 5–10.5)
POTASSIUM SERPL-SCNC: 5.3 MMOL/L (ref 3.5–5.1)
PROT SERPL-MCNC: 6.8 G/DL (ref 6.4–8.2)
RBC # BLD AUTO: 3.76 M/UL (ref 4–5.2)
SODIUM SERPL-SCNC: 137 MMOL/L (ref 136–145)
WBC # BLD AUTO: 7.8 K/UL (ref 4–11)

## 2025-07-25 PROCEDURE — 2580000003 HC RX 258: Performed by: HOSPITALIST

## 2025-07-25 PROCEDURE — 96366 THER/PROPH/DIAG IV INF ADDON: CPT

## 2025-07-25 PROCEDURE — 80053 COMPREHEN METABOLIC PANEL: CPT

## 2025-07-25 PROCEDURE — 72128 CT CHEST SPINE W/O DYE: CPT

## 2025-07-25 PROCEDURE — 85025 COMPLETE CBC W/AUTO DIFF WBC: CPT

## 2025-07-25 PROCEDURE — 96365 THER/PROPH/DIAG IV INF INIT: CPT

## 2025-07-25 PROCEDURE — 90471 IMMUNIZATION ADMIN: CPT | Performed by: EMERGENCY MEDICINE

## 2025-07-25 PROCEDURE — 2500000003 HC RX 250 WO HCPCS: Performed by: HOSPITALIST

## 2025-07-25 PROCEDURE — G0378 HOSPITAL OBSERVATION PER HR: HCPCS

## 2025-07-25 PROCEDURE — 6370000000 HC RX 637 (ALT 250 FOR IP): Performed by: EMERGENCY MEDICINE

## 2025-07-25 PROCEDURE — 6370000000 HC RX 637 (ALT 250 FOR IP): Performed by: HOSPITALIST

## 2025-07-25 PROCEDURE — 90714 TD VACC NO PRESV 7 YRS+ IM: CPT | Performed by: EMERGENCY MEDICINE

## 2025-07-25 PROCEDURE — 73562 X-RAY EXAM OF KNEE 3: CPT

## 2025-07-25 PROCEDURE — 73660 X-RAY EXAM OF TOE(S): CPT

## 2025-07-25 PROCEDURE — 99285 EMERGENCY DEPT VISIT HI MDM: CPT

## 2025-07-25 PROCEDURE — 6360000002 HC RX W HCPCS: Performed by: EMERGENCY MEDICINE

## 2025-07-25 PROCEDURE — 6360000002 HC RX W HCPCS: Performed by: HOSPITALIST

## 2025-07-25 PROCEDURE — 72131 CT LUMBAR SPINE W/O DYE: CPT

## 2025-07-25 RX ORDER — CLONIDINE HYDROCHLORIDE 0.1 MG/1
0.2 TABLET ORAL 3 TIMES DAILY
Status: DISCONTINUED | OUTPATIENT
Start: 2025-07-25 | End: 2025-07-26 | Stop reason: HOSPADM

## 2025-07-25 RX ORDER — MAGNESIUM SULFATE IN WATER 40 MG/ML
2000 INJECTION, SOLUTION INTRAVENOUS PRN
Status: DISCONTINUED | OUTPATIENT
Start: 2025-07-25 | End: 2025-07-25

## 2025-07-25 RX ORDER — POLYETHYLENE GLYCOL 3350 17 G/17G
17 POWDER, FOR SOLUTION ORAL DAILY PRN
Status: DISCONTINUED | OUTPATIENT
Start: 2025-07-25 | End: 2025-07-26 | Stop reason: HOSPADM

## 2025-07-25 RX ORDER — ACETAMINOPHEN 325 MG/1
650 TABLET ORAL EVERY 6 HOURS PRN
Status: DISCONTINUED | OUTPATIENT
Start: 2025-07-25 | End: 2025-07-26 | Stop reason: HOSPADM

## 2025-07-25 RX ORDER — VENLAFAXINE HYDROCHLORIDE 37.5 MG/1
150 TABLET, EXTENDED RELEASE ORAL
Status: DISCONTINUED | OUTPATIENT
Start: 2025-07-26 | End: 2025-07-25 | Stop reason: RX

## 2025-07-25 RX ORDER — SODIUM CHLORIDE 0.9 % (FLUSH) 0.9 %
5-40 SYRINGE (ML) INJECTION EVERY 12 HOURS SCHEDULED
Status: DISCONTINUED | OUTPATIENT
Start: 2025-07-25 | End: 2025-07-26 | Stop reason: HOSPADM

## 2025-07-25 RX ORDER — ASPIRIN 81 MG/1
81 TABLET ORAL DAILY
Status: DISCONTINUED | OUTPATIENT
Start: 2025-07-25 | End: 2025-07-26 | Stop reason: HOSPADM

## 2025-07-25 RX ORDER — ONDANSETRON 4 MG/1
4 TABLET, ORALLY DISINTEGRATING ORAL EVERY 8 HOURS PRN
Status: DISCONTINUED | OUTPATIENT
Start: 2025-07-25 | End: 2025-07-26 | Stop reason: HOSPADM

## 2025-07-25 RX ORDER — BUSPIRONE HYDROCHLORIDE 5 MG/1
10 TABLET ORAL 2 TIMES DAILY
Status: DISCONTINUED | OUTPATIENT
Start: 2025-07-25 | End: 2025-07-26 | Stop reason: HOSPADM

## 2025-07-25 RX ORDER — SEVELAMER CARBONATE 0.8 G/1
2.4 POWDER, FOR SUSPENSION ORAL
Status: DISCONTINUED | OUTPATIENT
Start: 2025-07-25 | End: 2025-07-26 | Stop reason: HOSPADM

## 2025-07-25 RX ORDER — ONDANSETRON 2 MG/ML
4 INJECTION INTRAMUSCULAR; INTRAVENOUS EVERY 6 HOURS PRN
Status: DISCONTINUED | OUTPATIENT
Start: 2025-07-25 | End: 2025-07-26 | Stop reason: HOSPADM

## 2025-07-25 RX ORDER — ENOXAPARIN SODIUM 100 MG/ML
40 INJECTION SUBCUTANEOUS DAILY
Status: DISCONTINUED | OUTPATIENT
Start: 2025-07-25 | End: 2025-07-25

## 2025-07-25 RX ORDER — POTASSIUM CHLORIDE 1500 MG/1
40 TABLET, EXTENDED RELEASE ORAL PRN
Status: DISCONTINUED | OUTPATIENT
Start: 2025-07-25 | End: 2025-07-25

## 2025-07-25 RX ORDER — SULFAMETHOXAZOLE AND TRIMETHOPRIM 800; 160 MG/1; MG/1
1 TABLET ORAL ONCE
Status: COMPLETED | OUTPATIENT
Start: 2025-07-25 | End: 2025-07-25

## 2025-07-25 RX ORDER — PANTOPRAZOLE SODIUM 40 MG/1
40 TABLET, DELAYED RELEASE ORAL
Status: DISCONTINUED | OUTPATIENT
Start: 2025-07-25 | End: 2025-07-26 | Stop reason: HOSPADM

## 2025-07-25 RX ORDER — SODIUM CHLORIDE 0.9 % (FLUSH) 0.9 %
5-40 SYRINGE (ML) INJECTION PRN
Status: DISCONTINUED | OUTPATIENT
Start: 2025-07-25 | End: 2025-07-26 | Stop reason: HOSPADM

## 2025-07-25 RX ORDER — VENLAFAXINE HYDROCHLORIDE 37.5 MG/1
37.5 CAPSULE, EXTENDED RELEASE ORAL
Status: DISCONTINUED | OUTPATIENT
Start: 2025-07-25 | End: 2025-07-26 | Stop reason: HOSPADM

## 2025-07-25 RX ORDER — SODIUM CHLORIDE 9 MG/ML
INJECTION, SOLUTION INTRAVENOUS PRN
Status: DISCONTINUED | OUTPATIENT
Start: 2025-07-25 | End: 2025-07-26 | Stop reason: HOSPADM

## 2025-07-25 RX ORDER — ACETAMINOPHEN 650 MG/1
650 SUPPOSITORY RECTAL EVERY 6 HOURS PRN
Status: DISCONTINUED | OUTPATIENT
Start: 2025-07-25 | End: 2025-07-26 | Stop reason: HOSPADM

## 2025-07-25 RX ORDER — METOPROLOL TARTRATE 50 MG
100 TABLET ORAL 2 TIMES DAILY
Status: DISCONTINUED | OUTPATIENT
Start: 2025-07-25 | End: 2025-07-26 | Stop reason: HOSPADM

## 2025-07-25 RX ORDER — SEVELAMER CARBONATE 2.4 G/1
2.4 POWDER, FOR SUSPENSION ORAL
Status: DISCONTINUED | OUTPATIENT
Start: 2025-07-25 | End: 2025-07-25 | Stop reason: RX

## 2025-07-25 RX ORDER — PREGABALIN 75 MG/1
75 CAPSULE ORAL NIGHTLY
COMMUNITY

## 2025-07-25 RX ORDER — ALPRAZOLAM 0.25 MG
0.25 TABLET ORAL
Status: ON HOLD | COMMUNITY
End: 2025-07-30 | Stop reason: CLARIF

## 2025-07-25 RX ORDER — AMLODIPINE BESYLATE 5 MG/1
5 TABLET ORAL DAILY
Status: DISCONTINUED | OUTPATIENT
Start: 2025-07-25 | End: 2025-07-26 | Stop reason: HOSPADM

## 2025-07-25 RX ORDER — HYDRALAZINE HYDROCHLORIDE 25 MG/1
50 TABLET, FILM COATED ORAL EVERY 8 HOURS SCHEDULED
Status: DISCONTINUED | OUTPATIENT
Start: 2025-07-25 | End: 2025-07-26 | Stop reason: HOSPADM

## 2025-07-25 RX ORDER — ALBUTEROL SULFATE 90 UG/1
2 INHALANT RESPIRATORY (INHALATION) EVERY 6 HOURS PRN
Status: DISCONTINUED | OUTPATIENT
Start: 2025-07-25 | End: 2025-07-26 | Stop reason: HOSPADM

## 2025-07-25 RX ORDER — POTASSIUM CHLORIDE 7.45 MG/ML
10 INJECTION INTRAVENOUS PRN
Status: DISCONTINUED | OUTPATIENT
Start: 2025-07-25 | End: 2025-07-25

## 2025-07-25 RX ADMIN — SEVELAMER CARBONATE 2.4 G: 800 FOR SUSPENSION ORAL at 18:02

## 2025-07-25 RX ADMIN — SULFAMETHOXAZOLE AND TRIMETHOPRIM 1 TABLET: 800; 160 TABLET ORAL at 15:45

## 2025-07-25 RX ADMIN — HYDRALAZINE HYDROCHLORIDE 50 MG: 25 TABLET ORAL at 20:41

## 2025-07-25 RX ADMIN — PANTOPRAZOLE SODIUM 40 MG: 40 TABLET, DELAYED RELEASE ORAL at 18:02

## 2025-07-25 RX ADMIN — METOPROLOL TARTRATE 100 MG: 50 TABLET, FILM COATED ORAL at 18:01

## 2025-07-25 RX ADMIN — VENLAFAXINE HYDROCHLORIDE 37.5 MG: 37.5 CAPSULE, EXTENDED RELEASE ORAL at 18:01

## 2025-07-25 RX ADMIN — CLONIDINE HYDROCHLORIDE 0.2 MG: 0.1 TABLET ORAL at 20:40

## 2025-07-25 RX ADMIN — Medication 10 ML: at 20:42

## 2025-07-25 RX ADMIN — BUSPIRONE HYDROCHLORIDE 10 MG: 5 TABLET ORAL at 20:41

## 2025-07-25 RX ADMIN — ASPIRIN 81 MG: 81 TABLET, COATED ORAL at 18:02

## 2025-07-25 RX ADMIN — CEFEPIME 1000 MG: 1 INJECTION, POWDER, FOR SOLUTION INTRAMUSCULAR; INTRAVENOUS at 17:40

## 2025-07-25 RX ADMIN — CLOSTRIDIUM TETANI TOXOID ANTIGEN (FORMALDEHYDE INACTIVATED) AND CORYNEBACTERIUM DIPHTHERIAE TOXOID ANTIGEN (FORMALDEHYDE INACTIVATED) 0.5 ML: 5; 2 INJECTION, SUSPENSION INTRAMUSCULAR at 15:46

## 2025-07-25 RX ADMIN — AMLODIPINE BESYLATE 5 MG: 5 TABLET ORAL at 18:02

## 2025-07-25 ASSESSMENT — PAIN DESCRIPTION - ORIENTATION
ORIENTATION: RIGHT
ORIENTATION: RIGHT

## 2025-07-25 ASSESSMENT — PAIN - FUNCTIONAL ASSESSMENT
PAIN_FUNCTIONAL_ASSESSMENT: 0-10
PAIN_FUNCTIONAL_ASSESSMENT: PREVENTS OR INTERFERES SOME ACTIVE ACTIVITIES AND ADLS

## 2025-07-25 ASSESSMENT — PAIN SCALES - GENERAL
PAINLEVEL_OUTOF10: 6
PAINLEVEL_OUTOF10: 5

## 2025-07-25 ASSESSMENT — PAIN DESCRIPTION - LOCATION
LOCATION: FOOT;KNEE
LOCATION: TOE (COMMENT WHICH ONE)

## 2025-07-25 ASSESSMENT — PAIN DESCRIPTION - FREQUENCY: FREQUENCY: CONTINUOUS

## 2025-07-25 ASSESSMENT — PAIN DESCRIPTION - PAIN TYPE: TYPE: ACUTE PAIN

## 2025-07-25 ASSESSMENT — PAIN DESCRIPTION - ONSET: ONSET: SUDDEN

## 2025-07-25 ASSESSMENT — PAIN DESCRIPTION - DESCRIPTORS: DESCRIPTORS: SHARP

## 2025-07-25 NOTE — ED PROVIDER NOTES
EMERGENCY MEDICINE PROVIDER NOTE    Patient Identification  Pt Name: Kristine Ramirez  MRN: [id number]  Birthdate 1975  Date of evaluation: 7/25/2025  Provider: Pedrito Cordova MD  PCP: Damon Mireles MD    Chief Complaint  Fall (Pt fell at home last night. Second toenail on right foot is partially ripped off. )      HPI  (History provided by patient)  This is a 50 y.o. female who was brought in by EMS transportation for evaluation after fall.  Last night around midnight, patient was preparing a late dinner when she dropped a potato onto the ground.  When she leaned over to pick it up, she lost her balance and fell forward.  She landed on her knees, then on her back.  She denies hitting her head or injuring her neck.  Although she denies any significant injury to her extremities, she did hyperextend her right second toe, causing partial avulsion of the toenail.  She had bleeding in the area, but developed no known lacerations or other injuries.  When she spoke to her PCP today, she was told to come to the emergency department for evaluation of her injuries.  Patient is currently complaining of pain to her bilateral knees, right second toe, thoracic back, and lumbar back.  She denies headache, neck pain, pain to the arms, abdominal pain, and chest pain.  She denied having any preceding symptoms such as palpitations, shortness of breath, lightheadedness, dizziness, etc.    I have reviewed the following nursing documentation:  Allergies: Amoxicillin, Levofloxacin, Vancomycin, Atorvastatin, Dilaudid [hydromorphone], and Tape [adhesive tape]    Past medical history:   Past Medical History:   Diagnosis Date    Acute respiratory failure due to COVID-19 ([facility]) 10/16/2021    Arterial ischemic stroke, ICA, left, acute ([facility])     Blind in both eyes     Cerebral artery occlusion with cerebral infarction ([facility])     CHF (congestive heart failure) ([facility])     Chronic kidney disease     COPD (chronic obstructive pulmonary

## 2025-07-25 NOTE — ED NOTES
Kristine Ramirez is a 50 y.o. female admitted for  Principal Problem:    ESRD (end stage renal disease) on dialysis ([facility])  Resolved Problems:    * No resolved hospital problems. *  .   Patient Home via EMS transportation with   Chief Complaint   Patient presents with    Fall     Pt fell at home last night. Second toenail on right foot is partially ripped off.    .  Patient is alert and Person, Place, and Time  Patient's baseline mobility: did not assess in ED  Code Status: Prior   Cardiac Rhythm:       Is patient on baseline Oxygen: no   Abnormal Assessment Findings: injury to toes on right foot.     Isolation: None      NIH Score:    C-SSRS: Risk of Suicide: No Risk  Bedside swallow:        Active LDA's:   Peripheral IV 07/25/25 Left Antecubital (Active)   Site Assessment Clean, dry & intact 07/25/25 1320   Line Status Blood return noted;Flushed;Normal saline locked 07/25/25 1320   Line Care Connections checked and tightened 07/25/25 1320           Family/Caregiver Present no   Any Concerns: no   Restraints no  Sitter no         Vitals: MEWS Score: 1    Vitals:    07/25/25 1330 07/25/25 1445 07/25/25 1530 07/25/25 1615   BP: (!) 152/77 (!) 156/79 (!) 143/77 (!) 153/74   Pulse:       Resp:       Temp:       TempSrc:       SpO2: 100% 94% 94% 94%       Last documented pain score (0-10 scale) Pain Level: 5  Pain medication administered No.    Pertinent or High Risk Medications/Drips: No.    Pending Blood Product Administration: no    Abnormal labs:   Abnormal Labs Reviewed   CBC WITH AUTO DIFFERENTIAL - Abnormal; Notable for the following components:       Result Value    RBC 3.76 (*)     Hemoglobin 11.3 (*)     Hematocrit 33.9 (*)     RDW 18.0 (*)     All other components within normal limits   COMPREHENSIVE METABOLIC PANEL W/ REFLEX TO MG FOR LOW K - Abnormal; Notable for the following components:    Potassium reflex Magnesium 5.3 (*)     Glucose 134 (*)     BUN 59 (*)     Creatinine 5.7 (*)     Est, Glom Filt

## 2025-07-25 NOTE — PROGRESS NOTES
Medication history completed telephonically and obtained by Pharmacy Technician Tia Hall and was completed based on information available during current patient encounter.     Allergies: Amoxicillin, Levofloxacin, Vancomycin, Atorvastatin, Dilaudid [hydromorphone], and Tape [adhesive tape]    Prior to Admission Medications       Med List Status: Complete Set By: Tia Hall at 2025  5:27 PM          Taking? Last Dose Informant Start Date End Date Provider LT     albuterol sulfate HFA (PROVENTIL;VENTOLIN;PROAIR) 108 (90 Base) MCG/ACT inhaler  Past Month  --  25  --  Damon Mireles MD      Inhale 2 puffs into the lungs every 6 hours as needed for Wheezing or Shortness of Breath     ALPRAZolam (XANAX) 0.25 MG tablet  2025  --  --  --  Niesha Steve MD      Take 1 tablet by mouth Daily with lunch. Patient takes daily at noon. Max Daily Amount: 0.25 mg     amLODIPine (NORVASC) 5 MG tablet  2025  --  25  --  Niesha Steve MD      Take 1 tablet by mouth daily     aspirin 81 MG EC tablet  2025  --  24  --  Merrick Alcala MD      Take 1 tablet by mouth daily     busPIRone (BUSPAR) 10 MG tablet  2025  --  25  --  Damon Mireles MD      Take 1 tablet by mouth 2 times daily     cloNIDine (CATAPRES) 0.2 MG tablet  2025  --  25  --  Damno Mireles MD      Take 1 tablet by mouth in the morning, at noon, and at bedtime     desvenlafaxine succinate (PRISTIQ) 25 MG TB24 extended release tablet  2025  --  25  --  Damon Mireles MD      Take 1 tablet by mouth daily     diclofenac sodium (VOLTAREN) 1 % GEL ()  Not Taking  --  25  Jeffy Barriga MD      Apply 2 g topically 2 times daily for 5 days     Patient not taking: Reported on 2025     Dulaglutide 4.5 MG/0.5ML SOAJ  Past Week  --  25  --  Damon Mireles MD      Inject 4.5 mg into the skin every 7 days     Patient taking

## 2025-07-25 NOTE — H&P
HOSPITALISTS HISTORY AND PHYSICAL    7/25/2025 4:34 PM    Patient Information:  KRISTINE RAMIREZ is a 50 y.o. female [id number]  PCP:  Damon Mireles MD (Tel: [phone] )    Chief complaint:    Chief Complaint   Patient presents with    Fall     Pt fell at home last night. Second toenail on right foot is partially ripped off.         History of Present Illness:  Kristine Ramirez is a 50 y.o. female who presented with   ER with complaints of fall.  Patient apparently last night around midnight was preparing a dinner she dropped a potato on the ground when she leaned over to  she lost her balance fell forward.  Landed on the knee.  Then back.  Patient denies any loss of conscious see did hyperextend her right second toe causing partial toe avulsion.  Had some bleeding around that area called PCP today.  Told to come to emergency department for any injury.  Patient only complaint is bilateral knee pain right second toe thoracic back pain.  Patient cannot did not make it to dialysis either.  Patient denies any fevers chills      REVIEW OF SYSTEMS:   Constitutional: Negative for fever,chills or night sweats  ENT: Negative for rhinorrhea, epistaxis, hoarseness, sore throat.  Respiratory: Negative for shortness of breath,wheezing  Cardiovascular: Negative for chest pain, palpitations   Gastrointestinal: Negative for nausea, vomiting, diarrhea  Genitourinary: Negative for polyuria, dysuria   Hematologic/Lymphatic: Negative for bleeding tendency, easy bruising  Musculoskeletal: Negative for myalgias and arthralgias  Neurologic: Negative for confusion,dysarthria.  Skin: Negative for itching,rash, good capillary refill.   Psychiatric: Negative for depression,anxiety, agitation.  Endocrine: Negative for polydipsia,polyuria,heat /cold intolerance.    Past

## 2025-07-25 NOTE — CONSULTS
[facility]  Consultation Note    Reason for Consult:  ESRD Management, Mild Hyperkalemia  Requesting Physician:  Dr. UTE Cordova    CHIEF COMPLAINT:  Fall    History obtained from records and patient.    HISTORY OF PRESENT ILLNESS:                Kristine Ramirez is a 50 y.o., female with significant past medical history of ESRD, on HD MWF at [facility] under our care, CVA, CHF, pericardial effusion, diabetes mellitus type 2, depression, hypertension, migraine headaches, hyperlipidemia who presents with fall.   Currently has right AVF.   I am asked to see the patient with regards to her HD need.  Needs transportation to outpt. HD.  K of 5.3 and HCO3 of 22 today.   Saturating 94% on room air.  SBP-140-160's.     Past Medical History:     has a past medical history of Acute respiratory failure due to COVID-19 (HCC), Arterial ischemic stroke, ICA, left, acute (HCC), Blind in both eyes, Cerebral artery occlusion with cerebral infarction (HCC), CHF (congestive heart failure) (HCC), Chronic kidney disease, COPD (chronic obstructive pulmonary disease) (HCC), Depression, Diabetes mellitus out of control, Diabetes mellitus, type II (HCC), Diabetic neuropathy associated with type 2 diabetes mellitus (HCC), Generalized headaches, Hypertension, Infertility, Insomnia, Migraine headache, Mixed hyperlipidemia, Otitis media, Pelvic abscess in female, Pneumonia, Stroke (HCC), and Stroke (HCC).   Past Surgical History:     has a past surgical history that includes Cervix surgery; eye surgery; Foot surgery (Right); Foot surgery (Bilateral); Foot surgery (Left); IR TUNNELED CVC PLACE WO SQ PORT/PUMP > 5 YEARS (9/7/2021); Dialysis fistula creation (Right, 2/10/2022); Upper gastrointestinal endoscopy (N/A, 9/5/2024); Upper gastrointestinal endoscopy (N/A, 12/5/2024); Cardiac procedure (N/A, 3/18/2025); and IR TUNNELED CVC PLACE WO SQ PORT/PUMP > 5 YEARS (6/13/2025).   Current Medications:    No

## 2025-07-25 NOTE — PROGRESS NOTES
4 Eyes Skin Assessment     NAME:  Kristine Ramirez  YOB: 1975  MEDICAL RECORD NUMBER:  [id number]    The patient is being assessed for  Admission    I agree that at least one RN has performed a thorough Head to Toe Skin Assessment on the patient. ALL assessment sites listed below have been assessed.      Areas assessed by both nurses:    Head, Face, Ears, Shoulders, Back, Chest, Arms, Elbows, Hands, Sacrum. Buttock, Coccyx, Ischium, Legs. Feet and Heels, and Under Medical Devices         Does the Patient have a Wound? No noted wound(s)       Robbie Prevention initiated by RN: No  Wound Care Orders initiated by RN: No    For hospital-acquired stage 1 & 2 and ALL Stage 3,4, Unstageable, DTI, NWPT, and Complex wounds: place order “IP Wound Care/Ostomy Nurse Eval and Treat” by RN under : No    New Ostomies, if present place, Ostomy referral order under : No     Nurse 1 eSignature: Electronically signed by Renetta Guevara RN on 7/25/25 at 6:40 PM EDT    **SHARE this note so that the co-signing nurse can place an eSignature**    Nurse 2 eSignature: Electronically signed by Natalia Resendiz RN on 7/25/25 at 6:42 PM EDT

## 2025-07-26 VITALS
HEART RATE: 93 BPM | HEIGHT: 67 IN | RESPIRATION RATE: 18 BRPM | WEIGHT: 143.74 LBS | BODY MASS INDEX: 22.56 KG/M2 | OXYGEN SATURATION: 92 % | TEMPERATURE: 98.2 F | SYSTOLIC BLOOD PRESSURE: 155 MMHG | DIASTOLIC BLOOD PRESSURE: 73 MMHG

## 2025-07-26 LAB
ANION GAP SERPL CALCULATED.3IONS-SCNC: 17 MMOL/L (ref 3–16)
BASOPHILS # BLD: 0 K/UL (ref 0–0.2)
BASOPHILS NFR BLD: 0.5 %
BUN SERPL-MCNC: 64 MG/DL (ref 7–20)
CALCIUM SERPL-MCNC: 8.2 MG/DL (ref 8.3–10.6)
CHLORIDE SERPL-SCNC: 99 MMOL/L (ref 99–110)
CO2 SERPL-SCNC: 21 MMOL/L (ref 21–32)
CREAT SERPL-MCNC: 6 MG/DL (ref 0.6–1.1)
DEPRECATED RDW RBC AUTO: 17.7 % (ref 12.4–15.4)
EOSINOPHIL # BLD: 0.3 K/UL (ref 0–0.6)
EOSINOPHIL NFR BLD: 4.1 %
GFR SERPLBLD CREATININE-BSD FMLA CKD-EPI: 8 ML/MIN/{1.73_M2}
GLUCOSE SERPL-MCNC: 132 MG/DL (ref 70–99)
HCT VFR BLD AUTO: 34.8 % (ref 36–48)
HGB BLD-MCNC: 11.6 G/DL (ref 12–16)
LYMPHOCYTES # BLD: 1.2 K/UL (ref 1–5.1)
LYMPHOCYTES NFR BLD: 16 %
MCH RBC QN AUTO: 30 PG (ref 26–34)
MCHC RBC AUTO-ENTMCNC: 33.2 G/DL (ref 31–36)
MCV RBC AUTO: 90.3 FL (ref 80–100)
MONOCYTES # BLD: 0.7 K/UL (ref 0–1.3)
MONOCYTES NFR BLD: 9.6 %
NEUTROPHILS # BLD: 5.3 K/UL (ref 1.7–7.7)
NEUTROPHILS NFR BLD: 69.8 %
PLATELET # BLD AUTO: 193 K/UL (ref 135–450)
PMV BLD AUTO: 8.2 FL (ref 5–10.5)
POTASSIUM SERPL-SCNC: 5.3 MMOL/L (ref 3.5–5.1)
RBC # BLD AUTO: 3.85 M/UL (ref 4–5.2)
SODIUM SERPL-SCNC: 137 MMOL/L (ref 136–145)
WBC # BLD AUTO: 7.6 K/UL (ref 4–11)

## 2025-07-26 PROCEDURE — 36415 COLL VENOUS BLD VENIPUNCTURE: CPT

## 2025-07-26 PROCEDURE — 85025 COMPLETE CBC W/AUTO DIFF WBC: CPT

## 2025-07-26 PROCEDURE — G0378 HOSPITAL OBSERVATION PER HR: HCPCS

## 2025-07-26 PROCEDURE — 6370000000 HC RX 637 (ALT 250 FOR IP): Performed by: HOSPITALIST

## 2025-07-26 PROCEDURE — 80048 BASIC METABOLIC PNL TOTAL CA: CPT

## 2025-07-26 PROCEDURE — 90935 HEMODIALYSIS ONE EVALUATION: CPT

## 2025-07-26 PROCEDURE — 6360000002 HC RX W HCPCS: Performed by: INTERNAL MEDICINE

## 2025-07-26 RX ORDER — SELENIUM 50 MCG
1 TABLET ORAL 2 TIMES DAILY
Qty: 14 CAPSULE | Refills: 0 | Status: SHIPPED | OUTPATIENT
Start: 2025-07-26 | End: 2025-08-02

## 2025-07-26 RX ORDER — HEPARIN SODIUM 1000 [USP'U]/ML
3600 INJECTION, SOLUTION INTRAVENOUS; SUBCUTANEOUS PRN
Status: DISCONTINUED | OUTPATIENT
Start: 2025-07-26 | End: 2025-07-26 | Stop reason: HOSPADM

## 2025-07-26 RX ORDER — CEPHALEXIN 500 MG/1
500 CAPSULE ORAL 2 TIMES DAILY
Qty: 14 CAPSULE | Refills: 0 | Status: SHIPPED | OUTPATIENT
Start: 2025-07-26 | End: 2025-08-02

## 2025-07-26 RX ADMIN — CLONIDINE HYDROCHLORIDE 0.2 MG: 0.1 TABLET ORAL at 12:31

## 2025-07-26 RX ADMIN — PANTOPRAZOLE SODIUM 40 MG: 40 TABLET, DELAYED RELEASE ORAL at 05:44

## 2025-07-26 RX ADMIN — HYDRALAZINE HYDROCHLORIDE 50 MG: 25 TABLET ORAL at 12:31

## 2025-07-26 RX ADMIN — HYDRALAZINE HYDROCHLORIDE 50 MG: 25 TABLET ORAL at 05:43

## 2025-07-26 RX ADMIN — HEPARIN SODIUM 3600 UNITS: 1000 INJECTION INTRAVENOUS; SUBCUTANEOUS at 12:00

## 2025-07-26 ASSESSMENT — PAIN SCALES - GENERAL
PAINLEVEL_OUTOF10: 5
PAINLEVEL_OUTOF10: 5

## 2025-07-26 ASSESSMENT — PAIN DESCRIPTION - FREQUENCY: FREQUENCY: CONTINUOUS

## 2025-07-26 ASSESSMENT — PAIN DESCRIPTION - ONSET: ONSET: GRADUAL

## 2025-07-26 ASSESSMENT — PAIN DESCRIPTION - PAIN TYPE
TYPE: ACUTE PAIN
TYPE: ACUTE PAIN

## 2025-07-26 ASSESSMENT — PAIN DESCRIPTION - ORIENTATION
ORIENTATION: RIGHT
ORIENTATION: RIGHT

## 2025-07-26 ASSESSMENT — PAIN DESCRIPTION - DESCRIPTORS
DESCRIPTORS: SHARP;SORE
DESCRIPTORS: THROBBING

## 2025-07-26 ASSESSMENT — PAIN DESCRIPTION - LOCATION
LOCATION: FOOT
LOCATION: FOOT

## 2025-07-26 NOTE — DISCHARGE SUMMARY
[facility]    Patient Demographics    Patient. Kristine Ramirez  Date of Birth. 1975  MRN. [id number]     Primary care provider. Damon Mireles MD  (Tel: [phone])    Admit date: 7/25/2025    Discharge date (blank if same as Note Date):   Note Date: 7/26/2025     Reason for Hospitalization.   Chief Complaint   Patient presents with    Fall     Pt fell at home last night. Second toenail on right foot is partially ripped off.          Significant Findings.   Principal Problem:    ESRD (end stage renal disease) on dialysis (HCC)  Resolved Problems:    * No resolved hospital problems. *       Problems and results from this hospitalization that need follow up.  ESRD on HD  Cellulitis left foot    Significant test results and incidental findings.  CT LUMBAR SPINE WO CONTRAST   Final Result   No acute abnormality identified in the thoracic or lumbar spine.       Moderate bilateral layering pleural effusions with associated atelectasis.           CT THORACIC SPINE WO CONTRAST   Final Result   No acute abnormality identified in the thoracic or lumbar spine.       Moderate bilateral layering pleural effusions with associated atelectasis.           XR TOE RIGHT (MIN 2 VIEWS)   Final Result   1. No acute finding of the right 2nd digit.   2. Age indeterminate fracture of the proximal phalanx of the 5th digit.   Correlate with physical exam.           XR KNEE RIGHT (3 VIEWS)   Final Result   Negative for fracture.           XR KNEE LEFT (3 VIEWS)   Final Result   Negative for fracture.               Invasive procedures and treatments.   None     Problem-based Hospital Course.  51 yo female presented with bilateral knee pain and right foot pain s/p mechanical fall at home, she lost balance when she bent over to  biscuit from floor. She missed dialysis session secondary being in

## 2025-07-26 NOTE — PROGRESS NOTES
Shift assessment completed. Routine vitals stable. Scheduled medications given. Patient is awake, alert and oriented x4. Respirations are easy and unlabored. Patient does not appear to be in distress, resting comfortably at this time. Call light within reach. Denies needs at this time. Pain 5/10 per patient . POC discussed, pt agreeable. Declines therapy needs at this time, NP notified, OK to discharge without therapy eval.    Constantin Alvarez, RAHEELN RN

## 2025-07-26 NOTE — PLAN OF CARE
Problem: Chronic Conditions and Co-morbidities  Goal: Patient's chronic conditions and co-morbidity symptoms are monitored and maintained or improved  7/26/2025 1354 by Constantin Alvarez RN  Outcome: Progressing  7/26/2025 0627 by Janessa Mann RN  Outcome: Progressing     Problem: Discharge Planning  Goal: Discharge to home or other facility with appropriate resources  7/26/2025 1354 by Constantin Alvarez RN  Outcome: Progressing  7/26/2025 0627 by Janessa Mann RN  Outcome: Progressing  Flowsheets (Taken 7/26/2025 0230)  Discharge to home or other facility with appropriate resources:   Identify barriers to discharge with patient and caregiver   Arrange for needed discharge resources and transportation as appropriate   Identify discharge learning needs (meds, wound care, etc)   Refer to discharge planning if patient needs post-hospital services based on physician order or complex needs related to functional status, cognitive ability or social support system     Problem: Pain  Goal: Verbalizes/displays adequate comfort level or baseline comfort level  7/26/2025 1354 by Constantin Alvarez RN  Outcome: Progressing  7/26/2025 0627 by Janessa Mann RN  Outcome: Progressing     Problem: Skin/Tissue Integrity  Goal: Skin integrity remains intact  Description: 1.  Monitor for areas of redness and/or skin breakdown  2.  Assess vascular access sites hourly  3.  Every 4-6 hours minimum:  Change oxygen saturation probe site  4.  Every 4-6 hours:  If on nasal continuous positive airway pressure, respiratory therapy assess nares and determine need for appliance change or resting period  7/26/2025 1354 by Constantin Alvarez RN  Outcome: Progressing  7/26/2025 0627 by Janessa Mann RN  Outcome: Progressing     Problem: Safety - Adult  Goal: Free from fall injury  7/26/2025 1354 by Constantin Alvarez RN  Outcome: Progressing  7/26/2025 0627 by Janessa Mann RN  Outcome: Progressing     Problem: ABCDS Injury

## 2025-07-26 NOTE — PROGRESS NOTES
[facility] Note    Patient Active Problem List   Diagnosis    Type 2 diabetes mellitus with hyperlipidemia (HCC)    Mixed hyperlipidemia    Migraine headache    Anemia    Diabetic foot infection (HCC)    Pyogenic inflammation of bone (HCC)    History of medication noncompliance    Osteomyelitis of left foot (HCC)    Nephrotic syndrome    Peripheral edema    Pulmonary edema    Right sided numbness    Tobacco dependence    Remote history of stroke    Cerebrovascular accident (CVA) (HCC)    HTN (hypertension), benign    DM (diabetes mellitus), secondary, uncontrolled, 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, associated with type 2 diabetes mellitus (HCC)    Cellulitis of right foot    Hidradenitis suppurativa    Hypocalcemia    Non-toxic multinodular goiter    Polyneuropathy due to type 2 diabetes mellitus (HCC)    Proliferative diabetic retinopathy associated with type 2 diabetes mellitus (HCC)    Epiglottitis    Recurrent falls    Hypotension    Leukocytosis    Volume overload    Hemodialysis catheter dysfunction    Hypoproteinemia    GABRIELA (obstructive sleep apnea)    Type 2 diabetes mellitus with obesity (HCC)    Wheelchair dependence    ESRD on dialysis (HCC)    Hyperkalemia    Suspected COVID-19 virus infection    Dependent on walker for ambulation    Medication management contract agreement - signed on 5/18/2023    Controlled drug dependence (HCC)    Generalized anxiety disorder with panic attacks    Coagulation defect    Chronic obstructive pulmonary disease, unspecified (HCC)    Abdominal distention    Encounter for assessment of ascites    Other ascites    Physical deconditioning    AMS (altered mental status)    Primary hypertension    Cardiomyopathy, unspecified type (HCC)

## 2025-07-26 NOTE — PROGRESS NOTES
Upon removing excess items from pt bed to obtain her AM weight, of which pt was fully aware and agreeable, this nurse heard pills in pt personal small bag she kept on the bed.  Once wt obtained this nurse asked pt what medications were in her bag.  Pt stated \"my Xanax and Lyrica, medicines I'm supposed to be getting but I'm not\".  This nurse asked pt if she had been taking her personal meds, pt denied doing so.  This nurse explained hospital policy is to lock up any/all pt home meds, returned back upon discharge.  Pt became defiant, refusing to relinquish meds, and continuously stating \"my primary care doctor prescribed these to me and I need them.  At this time this nurse requested Mary BATES Nurse at bedside.  Pt was informed of hospital policy r/t home medications at bedside multiple times, refused to accept.  Pt did not want to destroy her medications and did not want this nurse to take them, this nurse removed 2 pill bottles, assured pt they will be secured in pharmacy.  Pt will need provider follow-up in AM regarding these home medications being ordered.

## 2025-07-26 NOTE — PLAN OF CARE
Problem: Chronic Conditions and Co-morbidities  Goal: Patient's chronic conditions and co-morbidity symptoms are monitored and maintained or improved  7/26/2025 0627 by Janessa Mann RN  Outcome: Progressing     Problem: Discharge Planning  Goal: Discharge to home or other facility with appropriate resources  7/26/2025 0627 by Janessa Mann, RN  Outcome: Progressing  Flowsheets (Taken 7/26/2025 0230)  Discharge to home or other facility with appropriate resources:   Identify barriers to discharge with patient and caregiver   Arrange for needed discharge resources and transportation as appropriate   Identify discharge learning needs (meds, wound care, etc)   Refer to discharge planning if patient needs post-hospital services based on physician order or complex needs related to functional status, cognitive ability or social support system     Problem: Pain  Goal: Verbalizes/displays adequate comfort level or baseline comfort level  7/26/2025 0627 by Janessa Mann RN  Outcome: Progressing     Problem: Skin/Tissue Integrity  Goal: Skin integrity remains intact  Description: 1.  Monitor for areas of redness and/or skin breakdown  2.  Assess vascular access sites   3.  Every 4-6 hours minimum:  Change oxygen saturation probe site  4.  Every 4-6 hours:  If on nasal continuous positive airway pressure, respiratory therapy assess nares and determine need for appliance change or resting period  7/26/2025 0627 by Janessa Mann RN  Outcome: Progressing     Problem: Safety - Adult  Goal: Free from fall injury  7/26/2025 0627 by Janessa Mann, RN  Outcome: Progressing     Problem: ABCDS Injury Assessment  Goal: Absence of physical injury  7/26/2025 0627 by Janessa Mann RN  Outcome: Progressing

## 2025-07-26 NOTE — PROGRESS NOTES
This patient has had a discharge order placed. They are returning home and being picked up in the lobby. Discharge paperwork has been printed, highlighted, and gone over with the patient by this RN. Patient understands teaching and has no further questions at this time. IV has been removed with no complications. Telemetry has been removed. Pt has all belongings present.  Home medications (Xanax and Lyrica) returned to patient from unit lock box.    Constantin Alvarez RN BSN

## 2025-07-26 NOTE — PROGRESS NOTES
Pt. Assessment complete. Pt. In bed without complaints or distress. Fall/safety precautions in place. All needs met.

## 2025-07-26 NOTE — PROGRESS NOTES
Treatment time: 3.5 hrs    Net UF: 2000 ml     Pre weight: 66.7 kg  Post weight: kg     Access used: Ltdc  Access function:  tolerated well,  BFR 400ml/min     Medications or blood products given: heparin dwells     Regular outpatient schedule: MWF     Summary of response to treatment: Pt tolerated well. Pt remained stable throughout entire treatment and upon exiting the hemodialysis suite.      Copy of dialysis treatment record placed in chart, to be scanned into EMR.

## 2025-07-26 NOTE — PROGRESS NOTES
Left message of time. Will add to schedule. Told her to call back if that doesn't work   Physical Therapy/Occupational Therapy  Attempt Note    Name:Kristine Ramirez  :1975  MRN:[id number]  Room: [facility]3328/3328-01    Date of Service: 2025    PT/OT attempted to see for PT/OT eval at this time. Pt currently DAWNA at dialysis. Will attempt again as therapy schedule permits.            Thanks,  Juli Martinez PT, DPT [id number]  Gloria Puri, OTR/L -- [id number]

## 2025-07-26 NOTE — PLAN OF CARE
Problem: Chronic Conditions and Co-morbidities  Goal: Patient's chronic conditions and co-morbidity symptoms are monitored and maintained or improved  7/25/2025 2221 by Yvonne Cruz RN  Outcome: Progressing  7/25/2025 1825 by Natalia Resendiz RN  Outcome: Progressing     Problem: Discharge Planning  Goal: Discharge to home or other facility with appropriate resources  Outcome: Progressing     Problem: Pain  Goal: Verbalizes/displays adequate comfort level or baseline comfort level  7/25/2025 2221 by Yvonne Cruz RN  Outcome: Progressing  7/25/2025 1825 by Natalia Resendiz RN  Outcome: Progressing     Problem: Skin/Tissue Integrity  Goal: Skin integrity remains intact  Description: 1.  Monitor for areas of redness and/or skin breakdown  2.  Assess vascular access sites hourly  3.  Every 4-6 hours minimum:  Change oxygen saturation probe site  4.  Every 4-6 hours:  If on nasal continuous positive airway pressure, respiratory therapy assess nares and determine need for appliance change or resting period  Outcome: Progressing     Problem: Safety - Adult  Goal: Free from fall injury  7/25/2025 2221 by Yvonne Cruz RN  Outcome: Progressing  7/25/2025 1825 by Natalia Resendiz RN  Outcome: Progressing     Problem: ABCDS Injury Assessment  Goal: Absence of physical injury  Outcome: Progressing

## 2025-07-30 ENCOUNTER — APPOINTMENT (OUTPATIENT)
Dept: GENERAL RADIOLOGY | Age: 50
DRG: 640 | End: 2025-07-30
Payer: COMMERCIAL

## 2025-07-30 ENCOUNTER — HOSPITAL ENCOUNTER (INPATIENT)
Age: 50
LOS: 1 days | Discharge: HOME | DRG: 640 | End: 2025-08-01
Attending: EMERGENCY MEDICINE | Admitting: INTERNAL MEDICINE
Payer: COMMERCIAL

## 2025-07-30 DIAGNOSIS — Z99.2 ESRD NEEDING DIALYSIS (HCC): Primary | ICD-10-CM

## 2025-07-30 DIAGNOSIS — N18.6 ESRD NEEDING DIALYSIS (HCC): Primary | ICD-10-CM

## 2025-07-30 LAB
ANION GAP SERPL CALCULATED.3IONS-SCNC: 16 MMOL/L (ref 3–16)
BASOPHILS # BLD: 0 K/UL (ref 0–0.2)
BASOPHILS NFR BLD: 0.9 %
BUN SERPL-MCNC: 70 MG/DL (ref 7–20)
CALCIUM SERPL-MCNC: 8.9 MG/DL (ref 8.3–10.6)
CHLORIDE SERPL-SCNC: 102 MMOL/L (ref 99–110)
CO2 SERPL-SCNC: 17 MMOL/L (ref 21–32)
CREAT SERPL-MCNC: 5.7 MG/DL (ref 0.6–1.1)
DEPRECATED RDW RBC AUTO: 18 % (ref 12.4–15.4)
EKG ATRIAL RATE: 90 BPM
EKG DIAGNOSIS: NORMAL
EKG P AXIS: 20 DEGREES
EKG P-R INTERVAL: 158 MS
EKG Q-T INTERVAL: 376 MS
EKG QRS DURATION: 84 MS
EKG QTC CALCULATION (BAZETT): 459 MS
EKG R AXIS: 10 DEGREES
EKG T AXIS: 136 DEGREES
EKG VENTRICULAR RATE: 90 BPM
EOSINOPHIL # BLD: 0.3 K/UL (ref 0–0.6)
EOSINOPHIL NFR BLD: 5 %
GFR SERPLBLD CREATININE-BSD FMLA CKD-EPI: 8 ML/MIN/{1.73_M2}
GLUCOSE BLD-MCNC: 122 MG/DL (ref 70–99)
GLUCOSE BLD-MCNC: 84 MG/DL (ref 70–99)
GLUCOSE SERPL-MCNC: 133 MG/DL (ref 70–99)
HCT VFR BLD AUTO: 35.7 % (ref 36–48)
HGB BLD-MCNC: 11.7 G/DL (ref 12–16)
LYMPHOCYTES # BLD: 1.1 K/UL (ref 1–5.1)
LYMPHOCYTES NFR BLD: 18.6 %
MCH RBC QN AUTO: 30 PG (ref 26–34)
MCHC RBC AUTO-ENTMCNC: 32.8 G/DL (ref 31–36)
MCV RBC AUTO: 91.4 FL (ref 80–100)
MONOCYTES # BLD: 0.6 K/UL (ref 0–1.3)
MONOCYTES NFR BLD: 10.2 %
NEUTROPHILS # BLD: 3.8 K/UL (ref 1.7–7.7)
NEUTROPHILS NFR BLD: 65.3 %
NT-PROBNP SERPL-MCNC: ABNORMAL PG/ML (ref 0–124)
PERFORMED ON: ABNORMAL
PERFORMED ON: NORMAL
PLATELET # BLD AUTO: 155 K/UL (ref 135–450)
PMV BLD AUTO: 8.1 FL (ref 5–10.5)
POTASSIUM SERPL-SCNC: 5.7 MMOL/L (ref 3.5–5.1)
POTASSIUM SERPL-SCNC: ABNORMAL MMOL/L (ref 3.5–5.1)
RBC # BLD AUTO: 3.9 M/UL (ref 4–5.2)
REASON FOR REJECTION: NORMAL
REJECTED TEST: NORMAL
SODIUM SERPL-SCNC: 135 MMOL/L (ref 136–145)
TROPONIN, HIGH SENSITIVITY: 250 NG/L (ref 0–14)
TROPONIN, HIGH SENSITIVITY: 265 NG/L (ref 0–14)
WBC # BLD AUTO: 5.8 K/UL (ref 4–11)

## 2025-07-30 PROCEDURE — G0378 HOSPITAL OBSERVATION PER HR: HCPCS

## 2025-07-30 PROCEDURE — 93005 ELECTROCARDIOGRAM TRACING: CPT | Performed by: EMERGENCY MEDICINE

## 2025-07-30 PROCEDURE — 85025 COMPLETE CBC W/AUTO DIFF WBC: CPT

## 2025-07-30 PROCEDURE — 6370000000 HC RX 637 (ALT 250 FOR IP): Performed by: INTERNAL MEDICINE

## 2025-07-30 PROCEDURE — 80048 BASIC METABOLIC PNL TOTAL CA: CPT

## 2025-07-30 PROCEDURE — 71045 X-RAY EXAM CHEST 1 VIEW: CPT

## 2025-07-30 PROCEDURE — 90935 HEMODIALYSIS ONE EVALUATION: CPT

## 2025-07-30 PROCEDURE — 83880 ASSAY OF NATRIURETIC PEPTIDE: CPT

## 2025-07-30 PROCEDURE — 36415 COLL VENOUS BLD VENIPUNCTURE: CPT

## 2025-07-30 PROCEDURE — 84484 ASSAY OF TROPONIN QUANT: CPT

## 2025-07-30 PROCEDURE — 2500000003 HC RX 250 WO HCPCS: Performed by: INTERNAL MEDICINE

## 2025-07-30 PROCEDURE — 6360000002 HC RX W HCPCS: Performed by: INTERNAL MEDICINE

## 2025-07-30 PROCEDURE — 5A1D70Z PERFORMANCE OF URINARY FILTRATION, INTERMITTENT, LESS THAN 6 HOURS PER DAY: ICD-10-PCS | Performed by: INTERNAL MEDICINE

## 2025-07-30 PROCEDURE — 99285 EMERGENCY DEPT VISIT HI MDM: CPT

## 2025-07-30 RX ORDER — HEPARIN SODIUM 5000 [USP'U]/ML
5000 INJECTION, SOLUTION INTRAVENOUS; SUBCUTANEOUS EVERY 8 HOURS SCHEDULED
Status: DISCONTINUED | OUTPATIENT
Start: 2025-07-30 | End: 2025-08-01 | Stop reason: HOSPADM

## 2025-07-30 RX ORDER — SODIUM CHLORIDE 0.9 % (FLUSH) 0.9 %
5-40 SYRINGE (ML) INJECTION EVERY 12 HOURS SCHEDULED
Status: DISCONTINUED | OUTPATIENT
Start: 2025-07-30 | End: 2025-08-01 | Stop reason: HOSPADM

## 2025-07-30 RX ORDER — ONDANSETRON 2 MG/ML
4 INJECTION INTRAMUSCULAR; INTRAVENOUS EVERY 6 HOURS PRN
Status: DISCONTINUED | OUTPATIENT
Start: 2025-07-30 | End: 2025-08-01 | Stop reason: HOSPADM

## 2025-07-30 RX ORDER — ALPRAZOLAM 2 MG/1
2 TABLET, EXTENDED RELEASE ORAL DAILY
COMMUNITY

## 2025-07-30 RX ORDER — 0.9 % SODIUM CHLORIDE 0.9 %
100 INTRAVENOUS SOLUTION INTRAVENOUS PRN
Status: DISCONTINUED | OUTPATIENT
Start: 2025-07-30 | End: 2025-08-01 | Stop reason: HOSPADM

## 2025-07-30 RX ORDER — SEVELAMER CARBONATE 0.8 G/1
2.4 POWDER, FOR SUSPENSION ORAL
Status: DISCONTINUED | OUTPATIENT
Start: 2025-07-30 | End: 2025-08-01 | Stop reason: HOSPADM

## 2025-07-30 RX ORDER — PREGABALIN 75 MG/1
75 CAPSULE ORAL NIGHTLY
Status: DISCONTINUED | OUTPATIENT
Start: 2025-07-30 | End: 2025-08-01 | Stop reason: HOSPADM

## 2025-07-30 RX ORDER — CLONIDINE HYDROCHLORIDE 0.2 MG/1
0.2 TABLET ORAL 3 TIMES DAILY
Status: DISCONTINUED | OUTPATIENT
Start: 2025-07-30 | End: 2025-08-01 | Stop reason: HOSPADM

## 2025-07-30 RX ORDER — PANTOPRAZOLE SODIUM 40 MG/1
40 TABLET, DELAYED RELEASE ORAL
Status: DISCONTINUED | OUTPATIENT
Start: 2025-07-31 | End: 2025-08-01 | Stop reason: HOSPADM

## 2025-07-30 RX ORDER — HYDRALAZINE HYDROCHLORIDE 50 MG/1
50 TABLET, FILM COATED ORAL EVERY 8 HOURS SCHEDULED
Status: DISCONTINUED | OUTPATIENT
Start: 2025-07-30 | End: 2025-08-01 | Stop reason: HOSPADM

## 2025-07-30 RX ORDER — ASPIRIN 81 MG/1
81 TABLET ORAL DAILY
Status: DISCONTINUED | OUTPATIENT
Start: 2025-07-31 | End: 2025-08-01 | Stop reason: HOSPADM

## 2025-07-30 RX ORDER — DEXTROSE MONOHYDRATE 100 MG/ML
INJECTION, SOLUTION INTRAVENOUS CONTINUOUS PRN
Status: DISCONTINUED | OUTPATIENT
Start: 2025-07-30 | End: 2025-08-01 | Stop reason: HOSPADM

## 2025-07-30 RX ORDER — SODIUM CHLORIDE 0.9 % (FLUSH) 0.9 %
5-40 SYRINGE (ML) INJECTION PRN
Status: DISCONTINUED | OUTPATIENT
Start: 2025-07-30 | End: 2025-08-01 | Stop reason: HOSPADM

## 2025-07-30 RX ORDER — AMLODIPINE BESYLATE 5 MG/1
5 TABLET ORAL DAILY
Status: DISCONTINUED | OUTPATIENT
Start: 2025-07-31 | End: 2025-08-01 | Stop reason: HOSPADM

## 2025-07-30 RX ORDER — ACETAMINOPHEN 650 MG/1
650 SUPPOSITORY RECTAL EVERY 6 HOURS PRN
Status: DISCONTINUED | OUTPATIENT
Start: 2025-07-30 | End: 2025-08-01 | Stop reason: HOSPADM

## 2025-07-30 RX ORDER — GLUCAGON 1 MG/ML
1 KIT INJECTION PRN
Status: DISCONTINUED | OUTPATIENT
Start: 2025-07-30 | End: 2025-08-01 | Stop reason: HOSPADM

## 2025-07-30 RX ORDER — HEPARIN SODIUM 1000 [USP'U]/ML
1000 INJECTION, SOLUTION INTRAVENOUS; SUBCUTANEOUS ONCE
Status: DISCONTINUED | OUTPATIENT
Start: 2025-07-30 | End: 2025-08-01 | Stop reason: HOSPADM

## 2025-07-30 RX ORDER — ONDANSETRON 4 MG/1
4 TABLET, ORALLY DISINTEGRATING ORAL EVERY 8 HOURS PRN
Status: DISCONTINUED | OUTPATIENT
Start: 2025-07-30 | End: 2025-08-01 | Stop reason: HOSPADM

## 2025-07-30 RX ORDER — SODIUM CHLORIDE 9 MG/ML
INJECTION, SOLUTION INTRAVENOUS PRN
Status: DISCONTINUED | OUTPATIENT
Start: 2025-07-30 | End: 2025-08-01 | Stop reason: HOSPADM

## 2025-07-30 RX ORDER — METOPROLOL TARTRATE 100 MG/1
100 TABLET ORAL 2 TIMES DAILY
Status: DISCONTINUED | OUTPATIENT
Start: 2025-07-30 | End: 2025-08-01 | Stop reason: HOSPADM

## 2025-07-30 RX ORDER — INSULIN LISPRO 100 [IU]/ML
0-4 INJECTION, SOLUTION INTRAVENOUS; SUBCUTANEOUS
Status: DISCONTINUED | OUTPATIENT
Start: 2025-07-30 | End: 2025-08-01 | Stop reason: HOSPADM

## 2025-07-30 RX ORDER — ACETAMINOPHEN 325 MG/1
650 TABLET ORAL EVERY 6 HOURS PRN
Status: DISCONTINUED | OUTPATIENT
Start: 2025-07-30 | End: 2025-08-01 | Stop reason: HOSPADM

## 2025-07-30 RX ORDER — CEPHALEXIN 500 MG/1
500 CAPSULE ORAL 2 TIMES DAILY
Status: DISCONTINUED | OUTPATIENT
Start: 2025-07-30 | End: 2025-08-01 | Stop reason: HOSPADM

## 2025-07-30 RX ORDER — ALPRAZOLAM 0.25 MG
0.25 TABLET ORAL
Status: DISCONTINUED | OUTPATIENT
Start: 2025-07-31 | End: 2025-07-31

## 2025-07-30 RX ORDER — BUSPIRONE HYDROCHLORIDE 10 MG/1
10 TABLET ORAL 2 TIMES DAILY
Status: DISCONTINUED | OUTPATIENT
Start: 2025-07-30 | End: 2025-08-01 | Stop reason: HOSPADM

## 2025-07-30 RX ORDER — POLYETHYLENE GLYCOL 3350 17 G/17G
17 POWDER, FOR SOLUTION ORAL DAILY PRN
Status: DISCONTINUED | OUTPATIENT
Start: 2025-07-30 | End: 2025-08-01 | Stop reason: HOSPADM

## 2025-07-30 RX ADMIN — HYDRALAZINE HYDROCHLORIDE 50 MG: 50 TABLET ORAL at 21:08

## 2025-07-30 RX ADMIN — METOPROLOL 100 MG: 100 TABLET ORAL at 21:08

## 2025-07-30 RX ADMIN — CLONIDINE HYDROCHLORIDE 0.2 MG: 0.2 TABLET ORAL at 21:08

## 2025-07-30 RX ADMIN — PREGABALIN 75 MG: 75 CAPSULE ORAL at 21:08

## 2025-07-30 RX ADMIN — SEVELAMER CARBONATE 2.4 G: 800 FOR SUSPENSION ORAL at 19:02

## 2025-07-30 RX ADMIN — CEPHALEXIN 500 MG: 500 CAPSULE ORAL at 21:08

## 2025-07-30 RX ADMIN — SODIUM CHLORIDE, PRESERVATIVE FREE 10 ML: 5 INJECTION INTRAVENOUS at 21:08

## 2025-07-30 RX ADMIN — BUSPIRONE HYDROCHLORIDE 10 MG: 10 TABLET ORAL at 21:08

## 2025-07-30 RX ADMIN — HEPARIN SODIUM 5000 UNITS: 5000 INJECTION, SOLUTION INTRAVENOUS; SUBCUTANEOUS at 21:08

## 2025-07-30 ASSESSMENT — PAIN DESCRIPTION - ONSET: ONSET: GRADUAL

## 2025-07-30 ASSESSMENT — PAIN SCALES - GENERAL
PAINLEVEL_OUTOF10: 6
PAINLEVEL_OUTOF10: 6

## 2025-07-30 ASSESSMENT — PAIN DESCRIPTION - PAIN TYPE: TYPE: ACUTE PAIN

## 2025-07-30 ASSESSMENT — PAIN DESCRIPTION - ORIENTATION: ORIENTATION: UPPER

## 2025-07-30 ASSESSMENT — PAIN DESCRIPTION - DESCRIPTORS: DESCRIPTORS: ACHING

## 2025-07-30 ASSESSMENT — PAIN - FUNCTIONAL ASSESSMENT
PAIN_FUNCTIONAL_ASSESSMENT: PREVENTS OR INTERFERES SOME ACTIVE ACTIVITIES AND ADLS
PAIN_FUNCTIONAL_ASSESSMENT: 0-10

## 2025-07-30 ASSESSMENT — PAIN DESCRIPTION - LOCATION
LOCATION: BACK;NECK
LOCATION: NECK;BACK

## 2025-07-30 ASSESSMENT — ENCOUNTER SYMPTOMS
COUGH: 0
SHORTNESS OF BREATH: 1
GASTROINTESTINAL NEGATIVE: 1

## 2025-07-30 ASSESSMENT — PAIN DESCRIPTION - FREQUENCY: FREQUENCY: CONTINUOUS

## 2025-07-30 NOTE — FLOWSHEET NOTE
Pt arrived to 6318 from dialysis. Pt alert and oriented x4. Belongings at bedside. Tele applied. Pt on 1L NC (baseline is RA). Skin assessed. IV in L hand. RUE restriction due to dialysis access. Pt c/o pain in upper back and neck. Will continue to monitor.        07/30/25 1836   Vitals   Temp 97.4 °F (36.3 °C)   Temp Source Oral   Pulse 89   Heart Rate Source Monitor   Respirations 16   BP (!) 147/73   MAP (Calculated) 98   BP Location Left upper arm   BP Upper/Lower Upper   BP Method Automatic   Patient Position Semi fowlers   Pain Assessment   Pain Assessment 0-10   Pain Level 6   Patient's Stated Pain Goal 0 - No pain   Pain Location Back;Neck   Pain Orientation Upper   Pain Descriptors Aching   Functional Pain Assessment Prevents or interferes some active activities and ADLs   Pain Type Acute pain   Pain Radiating Towards n/a   Pain Frequency Continuous   Pain Onset Gradual   Non-Pharmaceutical Pain Intervention(s) Rest;Repositioned   Opioid-Induced Sedation   POSS Score 1   Oxygen Therapy   SpO2 98 %   Pulse Oximeter Device Mode Intermittent   Pulse Oximeter Device Location Left;Finger   O2 Device Nasal cannula   O2 Flow Rate (L/min) 1 L/min   Height and Weight   Height 1.702 m (5' 7\")

## 2025-07-30 NOTE — ED PROVIDER NOTES
THE [facility]  EMERGENCY DEPARTMENT ENCOUNTER          PHYSICIAN ASSISTANT NOTE       Date of evaluation: 7/30/2025    Chief Complaint     Fall and Fatigue      History of Present Illness     Kristine Ramirez is a 50 y.o. female who presents for fall.  Patient arrives via EMS from home.  EMS reports her living conditions are \"deplorable.\"  EMS reports the patient regularly misses dialysis unless they take her to the hospital, report they are concerned she is missing on purpose because she takes oxycodone at home and does not want it to be dialyzed out.  Patient reports she fell out of her bed to her chair today onto the floor.  Reports she was not there for very long, reports her  was home.  Patient denies any injury from the fall.  Patient overall denies any complaints really other than needing dialysis.  Patient reports she missed dialysis because she is just weak overall.    ASSESSMENT / PLAN  (MEDICAL DECISION MAKING)     INITIAL VITALS: BP: (!) 141/73, Temp: 97.8 °F (36.6 °C), Pulse: 84, Respirations: 13, SpO2: 91 %    Kristine Ramirez is a 50 y.o. female here for missed dialysis.  Patient is alert on arrival, appears chronically unwell.  Patient does have an oxygen requirement, suspect it is from volume overload due to missed dialysis.  Our chart indicates her last dialysis was on July 25, though she thinks she might of gone Monday.  Chest x-ray confirms pulmonary edema.  Elevated BUN and creatinine consistent with end-stage renal disease.  Potassium is 5.7 though hemolyzed.  Troponin BNP are also elevated though likely due to end-stage renal disease.  Social work was consulted as well as PT OT to help determine patient's ultimate disposition.  In the meantime patient will be admitted for further management with renal on board for dialysis.      Is this patient to be included in the SEP-1 core measure? No Exclusion criteria - the patient is NOT to be included for SEP-1 Core Measure due

## 2025-07-30 NOTE — ED NOTES
Patient Name: Kristine Ramirez  : 1975 50 y.o.  MRN: [id number]  ED Room #: A04/A04-04     Chief complaint:   Chief Complaint   Patient presents with    Fall    Fatigue     Hospital Problem/Diagnosis:   Hospital Problems           Last Modified POA    * (Principal) Hyperkalemia 2025 Yes         O2 Flow Rate:O2 Device: Nasal cannula O2 Flow Rate (L/min): 1 L/min (if applicable)  Cardiac Rhythm:   (if applicable)  Active LDA's:           How does patient ambulate? Unknown, did not assess in the Emergency Department    2. How does patient take pills? Unknown, no oral medications were given in the Emergency Department    3. Is patient alert? Drowsy, but responds to voice    4. Is patient oriented? To Person    5.   Patient arrived from:  home  Facility Name: ___________________________________________    6. If patient is disoriented or from a Skill Nursing Facility has family been notified of admission? No    7. Patient belongings? Belongings: Clothing    Disposition of belongings? Kept with Patient     8. Any specific patient or family belongings/needs/dynamics?   a. N/a    9. Miscellaneous comments/pending orders?  a. Pt admitted for emergent dialysis. PT refuses to go to dialysis. Kristine Ramirez is a 50 y.o. female who presents for fall.  Patient arrives via EMS from home.  EMS reports her living conditions are \"deplorable.\"  EMS reports the patient regularly misses dialysis unless they take her to the hospital, report they are concerned she is missing on purpose because she takes oxycodone at home and does not want it to be dialyzed out.  Patient reports she fell out of her bed to her chair today onto the floor.  Reports she was not there for very long, reports her  was home.  Patient denies any injury from the fall.  Patient overall denies any complaints really other than needing dialysis.  Patient reports she missed dialysis because she is just weak overall.      If there are any

## 2025-07-30 NOTE — ED TRIAGE NOTES
Pt presents from home after she states she fell today, patient states her legs have been getting weak causing her to fall. Pt states she was scheduled for dialysis today but did not go due to falling. Pt states her last dialysis was Monday but she has missed a few treatments. Pt states she usually uses a walker and wheelchair. Pt speaking with eyes closed, placed on nc due to o2 sat of 90% on room air. Pt denies sob or cough, no fever.

## 2025-07-30 NOTE — ED NOTES
This RN called dialysis RN regarding tele orders. Dialysis RN aware of tele monitor order.      Priscilla Moe, RN  07/30/25 1866

## 2025-07-30 NOTE — CONSULTS
Ph: [phone], Fax: [phone]           [facility]               Reason for admission:                 Admitted with fall and fatigue.    Brief Summary :     Kristine Ramirez is being seen by nephrology for ESRD with fluid overload.      Interval History and plan:      Blood pressure is relatively controlled.  Labs reviewed.    Plan:    Will arrange for dialysis today and remove fluid.  She is about 7 kg is above  her dry weight.    Hold Procrit as hemoglobin is greater than 10.  Continue phosphorus binders.  Check renal panel daily.  Check PTH.  Continue antihypertensives including torsemide daily.                   Assessment :     1.  ESRD:  Will plan HD per schedule.  She gets dialysis on Monday, Wednesday and Friday at [facility].  Access: AV fistula  Daily weights: Estimated dry weight is 63.5 kg.  Fluid restriction: 1 L  Nephrocap 1 tab PO daily    2.  Anemia:  Erythropoetin dose: Will continue with Retacrit to keep hemoglobin 10-11.  No active bleeding.    3.  Osteodystrophy:  Phosphate Binder: Continue with Renvela powder 3 times a day.  Continue renal diet and compliance with dialysis schedule.    4.  Hypertension: Continue amlodipine, torsemide 40 mg daily, clonidine 3 times daily, hydralazine and Lopressor.             [facility] would like to thank Damaris Ren, *   for opportunity to serve this patient      Please call with questions at-   24 Hrs Answering service [phone] or  7 am- 5 pm via Perfect serve or cell phone  Dr.Muhammad Lilly Robertson MD       HPI :     Kristine Ramirez is a 50 y.o. female presented to   the hospital on 7/30/2025 with fall and fatigue.    She  has past medical history of ESRD on hemodialysis.  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

## 2025-07-30 NOTE — ED PROVIDER NOTES
ED Attending Attestation Note     Date of evaluation: 7/30/2025    This patient was seen by the advance practice provider.  I have seen and examined the patient, agree with the workup, evaluation, management and diagnosis. The care plan has been discussed.  I have reviewed the ECG and concur with the KANDY's interpretation.  My assessment reveals a 50-year-old female who presents to the emergency department for evaluation after fall and likely missed dialysis.    EMS called to home earlier today after patient had a fall.  They are familiar with the patient's history, with frequent runs on the patient due to missed dialysis.  They states that the patient's living situation was \"deplorable\".  In the past she has missed dialysis due to chronic pain.  The patient is not able to provide clear dates as to when she was last dialyzed.  Here the patient states that she fell out of her bed onto the floor earlier today.  She denies striking her head or loss of consciousness.  She is unsure how long she was on the floor but maybe less than an hour.  She denies any areas of pain from the fall but does feel weak all over.    On examination fine adult female, speaking in complete sentences.  No increased work of breathing or accessory muscle use during respiration though she does have increased respiratory rate.  She does have crackles in the bases bilaterally.    Will proceed with laboratory workup, EKG and chest x-ray with concern for volume overload and likely will require admission for dialysis.    Tino Martinez MD MPH   Physician.       Tino Martinez MD  07/30/25 7622

## 2025-07-30 NOTE — H&P
V2.0  History and Physical      Name:  Kristine Ramirez /Age/Sex: 1975  (50 y.o. female)   MRN & CSN:  [id number] & [id number] Encounter Date/Time: 2025 2:19 PM EDT   [facility]CP: Damon Mireles MD       Hospital Day: 1    Assessment and Plan:   Kristine Ramirez is a 50 y.o. female with a pmh of ESRD on HD, known noncompliance with hemodialysis, DM-2, tobacco use disorder, recurrent falls, essential hypertension, GABRIELA, hyperlipidemia, wheelchair-bound who presents after a fall at home    Hospital Problems           Last Modified POA    * (Principal) Hyperkalemia 2025 Yes   # ESRD on HD  #Hyperkalemia without EKG changes  - Hx of missed dialysis sessions  - Moderate bilateral pleural effusions and pulmonary edema on imaging  - Missed dialysis sessions likely over the past 5 days  - Noncompliant with hemodialysis upon reviewing the medical record     #Fall  - Presented after fall at home  - Hx of recurrent falls  - SNF has been recommended during her recent hospitalizations, but this was declined  - Continues to express that she is not interested in going to facility for rehab or in home care  - PT/OT for possible bedside PT as patient is mostly wheelchair-bound  - Fall precautions     #Suspected cellulitis of right second toe  -Patient was started on Keflex 500 mg every 12 hourly at discharge from [facility] on   - Continued Keflex    #DM-2  - Patient is on dulaglutide every Monday  - Started on low-dose SSI with hypoglycemia protocol    #Chronic medical conditions as mentioned in PMH  -Continue home medications as ordered    Disposition:   Current Living situation: Home  Expected Disposition: Home versus SNF  Estimated D/C: 2-3 days    Diet ADULT DIET; Regular; 4 carb choices (60 gm/meal); Low Fat/Low Chol/High Fiber/2 gm Na; Low Potassium (Less than 3000 mg/day); Low Phosphorus (Less than 1000 mg); Less than 60 gm   DVT Prophylaxis [] Lovenox, [x]

## 2025-07-31 LAB
ALBUMIN SERPL-MCNC: 3.4 G/DL (ref 3.4–5)
ANION GAP SERPL CALCULATED.3IONS-SCNC: 13 MMOL/L (ref 3–16)
BUN SERPL-MCNC: 45 MG/DL (ref 7–20)
CALCIUM SERPL-MCNC: 8.6 MG/DL (ref 8.3–10.6)
CHLORIDE SERPL-SCNC: 100 MMOL/L (ref 99–110)
CO2 SERPL-SCNC: 21 MMOL/L (ref 21–32)
CREAT SERPL-MCNC: 4.2 MG/DL (ref 0.6–1.1)
GFR SERPLBLD CREATININE-BSD FMLA CKD-EPI: 12 ML/MIN/{1.73_M2}
GLUCOSE BLD-MCNC: 110 MG/DL (ref 70–99)
GLUCOSE BLD-MCNC: 158 MG/DL (ref 70–99)
GLUCOSE BLD-MCNC: 168 MG/DL (ref 70–99)
GLUCOSE SERPL-MCNC: 136 MG/DL (ref 70–99)
PERFORMED ON: ABNORMAL
PHOSPHATE SERPL-MCNC: 5.4 MG/DL (ref 2.5–4.9)
POTASSIUM SERPL-SCNC: 4.8 MMOL/L (ref 3.5–5.1)
SODIUM SERPL-SCNC: 134 MMOL/L (ref 136–145)

## 2025-07-31 PROCEDURE — 97162 PT EVAL MOD COMPLEX 30 MIN: CPT

## 2025-07-31 PROCEDURE — 6370000000 HC RX 637 (ALT 250 FOR IP): Performed by: INTERNAL MEDICINE

## 2025-07-31 PROCEDURE — G0378 HOSPITAL OBSERVATION PER HR: HCPCS

## 2025-07-31 PROCEDURE — 80069 RENAL FUNCTION PANEL: CPT

## 2025-07-31 PROCEDURE — 97530 THERAPEUTIC ACTIVITIES: CPT

## 2025-07-31 PROCEDURE — 97166 OT EVAL MOD COMPLEX 45 MIN: CPT

## 2025-07-31 PROCEDURE — 97116 GAIT TRAINING THERAPY: CPT

## 2025-07-31 PROCEDURE — 6370000000 HC RX 637 (ALT 250 FOR IP): Performed by: NURSE PRACTITIONER

## 2025-07-31 PROCEDURE — 97535 SELF CARE MNGMENT TRAINING: CPT

## 2025-07-31 PROCEDURE — 90935 HEMODIALYSIS ONE EVALUATION: CPT

## 2025-07-31 PROCEDURE — 6370000000 HC RX 637 (ALT 250 FOR IP)

## 2025-07-31 PROCEDURE — 36415 COLL VENOUS BLD VENIPUNCTURE: CPT

## 2025-07-31 PROCEDURE — 6360000002 HC RX W HCPCS: Performed by: INTERNAL MEDICINE

## 2025-07-31 PROCEDURE — 2500000003 HC RX 250 WO HCPCS: Performed by: INTERNAL MEDICINE

## 2025-07-31 RX ORDER — TORSEMIDE 20 MG/1
40 TABLET ORAL DAILY
Status: DISCONTINUED | OUTPATIENT
Start: 2025-07-31 | End: 2025-08-01 | Stop reason: HOSPADM

## 2025-07-31 RX ORDER — ALPRAZOLAM 0.25 MG
0.25 TABLET ORAL EVERY 6 HOURS PRN
Status: DISCONTINUED | OUTPATIENT
Start: 2025-07-31 | End: 2025-08-01 | Stop reason: HOSPADM

## 2025-07-31 RX ORDER — BUTALBITAL, ACETAMINOPHEN AND CAFFEINE 50; 325; 40 MG/1; MG/1; MG/1
1 TABLET ORAL EVERY 4 HOURS PRN
Status: DISCONTINUED | OUTPATIENT
Start: 2025-07-31 | End: 2025-08-01 | Stop reason: HOSPADM

## 2025-07-31 RX ADMIN — TORSEMIDE 40 MG: 20 TABLET ORAL at 15:51

## 2025-07-31 RX ADMIN — PANTOPRAZOLE SODIUM 40 MG: 40 TABLET, DELAYED RELEASE ORAL at 15:51

## 2025-07-31 RX ADMIN — PANTOPRAZOLE SODIUM 40 MG: 40 TABLET, DELAYED RELEASE ORAL at 05:24

## 2025-07-31 RX ADMIN — ALPRAZOLAM 0.25 MG: 0.25 TABLET ORAL at 11:17

## 2025-07-31 RX ADMIN — HYDRALAZINE HYDROCHLORIDE 50 MG: 50 TABLET ORAL at 22:04

## 2025-07-31 RX ADMIN — BUSPIRONE HYDROCHLORIDE 10 MG: 10 TABLET ORAL at 22:04

## 2025-07-31 RX ADMIN — ASPIRIN 81 MG: 81 TABLET, COATED ORAL at 11:18

## 2025-07-31 RX ADMIN — CLONIDINE HYDROCHLORIDE 0.2 MG: 0.2 TABLET ORAL at 15:51

## 2025-07-31 RX ADMIN — SEVELAMER CARBONATE 2.4 G: 800 FOR SUSPENSION ORAL at 15:53

## 2025-07-31 RX ADMIN — CLONIDINE HYDROCHLORIDE 0.2 MG: 0.2 TABLET ORAL at 22:05

## 2025-07-31 RX ADMIN — HEPARIN SODIUM 5000 UNITS: 5000 INJECTION, SOLUTION INTRAVENOUS; SUBCUTANEOUS at 05:24

## 2025-07-31 RX ADMIN — SODIUM CHLORIDE, PRESERVATIVE FREE 10 ML: 5 INJECTION INTRAVENOUS at 11:21

## 2025-07-31 RX ADMIN — BUTALBITAL, ACETAMINOPHEN, AND CAFFEINE 1 TABLET: 50; 325; 40 TABLET ORAL at 04:25

## 2025-07-31 RX ADMIN — ACETAMINOPHEN 650 MG: 325 TABLET ORAL at 11:19

## 2025-07-31 RX ADMIN — SODIUM CHLORIDE, PRESERVATIVE FREE 10 ML: 5 INJECTION INTRAVENOUS at 22:05

## 2025-07-31 RX ADMIN — AMLODIPINE BESYLATE 5 MG: 5 TABLET ORAL at 11:17

## 2025-07-31 RX ADMIN — ALPRAZOLAM 0.25 MG: 0.25 TABLET ORAL at 22:17

## 2025-07-31 RX ADMIN — HYDRALAZINE HYDROCHLORIDE 50 MG: 50 TABLET ORAL at 05:24

## 2025-07-31 RX ADMIN — CEPHALEXIN 500 MG: 500 CAPSULE ORAL at 11:17

## 2025-07-31 RX ADMIN — PREGABALIN 75 MG: 75 CAPSULE ORAL at 22:05

## 2025-07-31 RX ADMIN — SEVELAMER CARBONATE 2.4 G: 800 FOR SUSPENSION ORAL at 11:18

## 2025-07-31 RX ADMIN — HEPARIN SODIUM 5000 UNITS: 5000 INJECTION, SOLUTION INTRAVENOUS; SUBCUTANEOUS at 22:04

## 2025-07-31 RX ADMIN — CLONIDINE HYDROCHLORIDE 0.2 MG: 0.2 TABLET ORAL at 11:17

## 2025-07-31 RX ADMIN — BUSPIRONE HYDROCHLORIDE 10 MG: 10 TABLET ORAL at 11:17

## 2025-07-31 RX ADMIN — HYDRALAZINE HYDROCHLORIDE 50 MG: 50 TABLET ORAL at 15:51

## 2025-07-31 RX ADMIN — METOPROLOL 100 MG: 100 TABLET ORAL at 11:17

## 2025-07-31 RX ADMIN — CEPHALEXIN 500 MG: 500 CAPSULE ORAL at 22:04

## 2025-07-31 RX ADMIN — METOPROLOL 100 MG: 100 TABLET ORAL at 22:04

## 2025-07-31 RX ADMIN — HEPARIN SODIUM 5000 UNITS: 5000 INJECTION, SOLUTION INTRAVENOUS; SUBCUTANEOUS at 15:52

## 2025-07-31 ASSESSMENT — PAIN DESCRIPTION - DESCRIPTORS
DESCRIPTORS: ACHING

## 2025-07-31 ASSESSMENT — PAIN DESCRIPTION - ORIENTATION
ORIENTATION: ANTERIOR
ORIENTATION: POSTERIOR
ORIENTATION: ANTERIOR

## 2025-07-31 ASSESSMENT — PAIN DESCRIPTION - LOCATION
LOCATION: HEAD
LOCATION: NECK
LOCATION: HEAD

## 2025-07-31 ASSESSMENT — PAIN SCALES - GENERAL
PAINLEVEL_OUTOF10: 10
PAINLEVEL_OUTOF10: 10

## 2025-07-31 ASSESSMENT — PAIN - FUNCTIONAL ASSESSMENT: PAIN_FUNCTIONAL_ASSESSMENT: ACTIVITIES ARE NOT PREVENTED

## 2025-07-31 NOTE — DIALYSIS
Treatment time: 3.5 hours  Net UF:  3923 ml     Pre weight: 68.2 kg  Post weight:64.3 kg  EDW: 63.5 kg      Access used:  L TDC    Access function: well with  ml/min     Medications or blood products given: Heparin Dwells     Regular outpatient schedule: MWF, done today due to being fluid overloaded      Summary of response to treatment: Patient tolerated treatment well and without any complications. Patient remained stable throughout entire treatment and upon the exiting the dialysis suite via transport.     Report given to Malia Mario RN and copy of dialysis treatment record placed in chart, to be scanned into EMR.

## 2025-08-01 VITALS
HEIGHT: 67 IN | SYSTOLIC BLOOD PRESSURE: 180 MMHG | HEART RATE: 85 BPM | WEIGHT: 139.99 LBS | TEMPERATURE: 97.6 F | DIASTOLIC BLOOD PRESSURE: 74 MMHG | RESPIRATION RATE: 16 BRPM | BODY MASS INDEX: 21.97 KG/M2 | OXYGEN SATURATION: 93 %

## 2025-08-01 LAB
ALBUMIN SERPL-MCNC: 3.8 G/DL (ref 3.4–5)
ANION GAP SERPL CALCULATED.3IONS-SCNC: 11 MMOL/L (ref 3–16)
BUN SERPL-MCNC: 11 MG/DL (ref 7–20)
CALCIUM SERPL-MCNC: 8.9 MG/DL (ref 8.3–10.6)
CHLORIDE SERPL-SCNC: 100 MMOL/L (ref 99–110)
CO2 SERPL-SCNC: 27 MMOL/L (ref 21–32)
CREAT SERPL-MCNC: 1.7 MG/DL (ref 0.6–1.1)
GFR SERPLBLD CREATININE-BSD FMLA CKD-EPI: 36 ML/MIN/{1.73_M2}
GLUCOSE BLD-MCNC: 101 MG/DL (ref 70–99)
GLUCOSE BLD-MCNC: 119 MG/DL (ref 70–99)
GLUCOSE SERPL-MCNC: 156 MG/DL (ref 70–99)
PERFORMED ON: ABNORMAL
PERFORMED ON: ABNORMAL
PHOSPHATE SERPL-MCNC: 2.3 MG/DL (ref 2.5–4.9)
POTASSIUM SERPL-SCNC: 3.2 MMOL/L (ref 3.5–5.1)
PTH-INTACT SERPL-MCNC: 72.1 PG/ML (ref 14–72)
SODIUM SERPL-SCNC: 138 MMOL/L (ref 136–145)

## 2025-08-01 PROCEDURE — 36415 COLL VENOUS BLD VENIPUNCTURE: CPT

## 2025-08-01 PROCEDURE — 6370000000 HC RX 637 (ALT 250 FOR IP): Performed by: INTERNAL MEDICINE

## 2025-08-01 PROCEDURE — 6360000002 HC RX W HCPCS: Performed by: INTERNAL MEDICINE

## 2025-08-01 PROCEDURE — 80069 RENAL FUNCTION PANEL: CPT

## 2025-08-01 PROCEDURE — G0378 HOSPITAL OBSERVATION PER HR: HCPCS

## 2025-08-01 PROCEDURE — 83970 ASSAY OF PARATHORMONE: CPT

## 2025-08-01 PROCEDURE — 90935 HEMODIALYSIS ONE EVALUATION: CPT

## 2025-08-01 PROCEDURE — 6370000000 HC RX 637 (ALT 250 FOR IP): Performed by: NURSE PRACTITIONER

## 2025-08-01 RX ORDER — HEPARIN SODIUM 1000 [USP'U]/ML
3600 INJECTION, SOLUTION INTRAVENOUS; SUBCUTANEOUS PRN
Status: DISCONTINUED | OUTPATIENT
Start: 2025-08-01 | End: 2025-08-01 | Stop reason: HOSPADM

## 2025-08-01 RX ADMIN — HEPARIN SODIUM 3600 UNITS: 1000 INJECTION, SOLUTION INTRAVENOUS; SUBCUTANEOUS at 10:54

## 2025-08-01 RX ADMIN — AMLODIPINE BESYLATE 5 MG: 5 TABLET ORAL at 11:57

## 2025-08-01 RX ADMIN — ALPRAZOLAM 0.25 MG: 0.25 TABLET ORAL at 11:57

## 2025-08-01 RX ADMIN — METOPROLOL 100 MG: 100 TABLET ORAL at 11:57

## 2025-08-01 RX ADMIN — TORSEMIDE 40 MG: 20 TABLET ORAL at 11:57

## 2025-08-01 RX ADMIN — ASPIRIN 81 MG: 81 TABLET, COATED ORAL at 11:57

## 2025-08-01 RX ADMIN — BUSPIRONE HYDROCHLORIDE 10 MG: 10 TABLET ORAL at 11:57

## 2025-08-01 RX ADMIN — CLONIDINE HYDROCHLORIDE 0.2 MG: 0.2 TABLET ORAL at 11:57

## 2025-08-01 RX ADMIN — CEPHALEXIN 500 MG: 500 CAPSULE ORAL at 11:57

## 2025-08-01 NOTE — DISCHARGE SUMMARY
wheelchair-bound  - Fall precautions  -PT OT recommended subacute rehab though patient is refusing to go anywhere except home     #Suspected cellulitis of right second toe  -Patient was started on Keflex 500 mg every 12 hourly at discharge from [facility] on July 26th  - Continued Keflex     #DM-2  - Patient is on dulaglutide every Monday  - Started on low-dose SSI with hypoglycemia protocol     #Chronic medical conditions as mentioned in PMH  -Continue home medications as ordered      The patient expressed appropriate understanding of, and agreement with the discharge recommendations, medications, and plan.     Consults this admission:  IP CONSULT TO SOCIAL WORK  IP CONSULT TO NEPHROLOGY    Discharge Diagnosis:   Hyperkalemia  Fluid overload  ESRD  Fall    Discharge Instruction:   Follow up appointments:   Primary care physician: Damon Mireles MD within 1 week  Diet: renal diet   Activity: activity as tolerated  Disposition: Discharged to:   [x]Home, [][facility], []SNF, []Acute Rehab, []Hospice   Condition on discharge: Stable  Labs and Tests to be Followed up as an outpatient by PCP or Specialist:     Discharge Medications:        Medication List        CHANGE how you take these medications      cephALEXin 500 MG capsule  Commonly known as: KEFLEX  Take 1 capsule by mouth 2 times daily for 7 days  What changed: additional instructions            CONTINUE taking these medications      acidophilus Caps capsule  Take 1 capsule by mouth 2 times daily for 7 days     albuterol sulfate  (90 Base) MCG/ACT inhaler  Commonly known as: PROVENTIL;VENTOLIN;PROAIR  Inhale 2 puffs into the lungs every 6 hours as needed for Wheezing or Shortness of Breath     amLODIPine 5 MG tablet  Commonly known as: NORVASC     aspirin 81 MG EC tablet  Take 1 tablet by mouth daily     busPIRone 10 MG tablet  Commonly known as: BUSPAR  Take 1 tablet by mouth 2 times daily     cloNIDine 0.2 MG tablet  Commonly known as: CATAPRES  Take 
Yes

## 2025-08-01 NOTE — PLAN OF CARE
Problem: Chronic Conditions and Co-morbidities  Goal: Patient's chronic conditions and co-morbidity symptoms are monitored and maintained or improved  Outcome: Progressing     Problem: Discharge Planning  Goal: Discharge to home or other facility with appropriate resources  Outcome: Progressing   Continuing to work with patient and health care team on discharge plan. Discharge instructions and medication management will be reviewed prior to discharge.    Problem: Pain  Goal: Verbalizes/displays adequate comfort level or baseline comfort level  Outcome: Progressing     Problem: Safety - Adult  Goal: Free from fall injury  Outcome: Progressing   Pt free from falls this shift. Fall precautions in place at all times. Call light always within reach. Pt able and agreeable to contact for safety appropriately.    Problem: Safety - Adult  Goal: Free from fall injury  Outcome: Progressing     Problem: ABCDS Injury Assessment  Goal: Absence of physical injury  Outcome: Progressing

## 2025-08-01 NOTE — PROGRESS NOTES
[employer]                [phone]              NEPHROLOGIST DIALYSIS NOTE:    Seen during dialysis  Vitals and labs as given below.   Will remove 4 liters with HD  Please see today's consult note for remaining details    Vitals:    07/30/25 1419   BP: (!) 141/75   Pulse: 86   Resp: 16   Temp: 98.3 °F (36.8 °C)   SpO2:      Lab Results   Component Value Date/Time     07/30/2025 11:53 AM    K 5.7 07/30/2025 01:01 PM    K see below 07/30/2025 11:53 AM     07/30/2025 11:53 AM    CO2 17 07/30/2025 11:53 AM    BUN 70 07/30/2025 11:53 AM    CREATININE 5.7 07/30/2025 11:53 AM    GLUCOSE 133 07/30/2025 11:53 AM    CALCIUM 8.9 07/30/2025 11:53 AM           Please call with questions at-    24 Hrs Answering service [phone]  Perfect serve, or cell phone 7 am - 5pm  Fausto Villafuerte MD   [employer]  
   Clinical Pharmacy Note  Medication History     Admit Date: 7/30/2025       List of current medications patient is taking is complete. Home Medication list in EPIC updated to reflect changes noted below.    Source of Information:     Review of Rx dispense history (Surescripts-complete dispense report in Epic)    Discussion with patient      Patient's home pharmacy: [id number] [place] [id number] - P [phone] - F [phone]      CHANGES TO HOME MEDICATION LIST:    REMOVED:   none    ADDED:  none    DOSE or FREQUENCY CHANGE:  1) Alprazolam - pt takes Xanax ER 2 mg daily at noon   (NOT taking alprazolam 0.25 mg daily as was ordered on admission)  NOTE- therapeutic interchange of Xanax ER 2 mg daily is 0.5 mg q6h   - sent Perfect Serve to Dr Ren        Current Outpatient Medications   Medication Instructions    albuterol sulfate HFA (PROVENTIL;VENTOLIN;PROAIR) 108 (90 Base) MCG/ACT inhaler 2 puffs, Inhalation, EVERY 6 HOURS PRN    ALPRAZolam (XANAX XR) 2 mg, Oral, EVERY MORNING    amLODIPine (NORVASC) 5 mg, DAILY    aspirin 81 mg, Oral, DAILY    busPIRone (BUSPAR) 10 mg, Oral, 2 TIMES DAILY    cephALEXin (KEFLEX) 500 mg, Oral, 2 TIMES DAILY    cloNIDine (CATAPRES) 0.2 mg, Oral, 3 times daily    desvenlafaxine succinate (PRISTIQ) 25 mg, Oral, DAILY    Dulaglutide 4.5 mg, EVERY 7 DAYS    hydrALAZINE (APRESOLINE) 50 mg, Oral, EVERY 8 HOURS SCHEDULED    Lactobacillus (ACIDOPHILUS) CAPS capsule 1 capsule, Oral, 2 TIMES DAILY    metoprolol (LOPRESSOR) 100 mg, Oral, 2 TIMES DAILY    pantoprazole (PROTONIX) 40 mg, Oral, 2 TIMES DAILY BEFORE MEALS    pregabalin (LYRICA) 75 mg, NIGHTLY    sevelamer (RENVELA) 2.4 g, 3 TIMES DAILY WITH MEALS    torsemide (DEMADEX) 40 mg, Oral, DAILY         [facility] PharmD, [facility]   Inpatient pharmacy [id number]            
4 Eyes Skin Assessment     NAME:  Kristine Ramirez  YOB: 1975  MEDICAL RECORD NUMBER:  [id number]    The patient is being assessed for  Admission    I agree that at least one RN has performed a thorough Head to Toe Skin Assessment on the patient. ALL assessment sites listed below have been assessed.      Areas assessed by both nurses:    Head, Face, Ears, Shoulders, Back, Chest, Arms, Elbows, Hands, Sacrum. Buttock, Coccyx, Ischium, Legs. Feet and Heels, and Under Medical Devices         Does the Patient have a Wound? Yes wound(s) were present on assessment. LDA wound assessment was Initiated and completed by RN     Healing wounds noted on toes     Scattered abrasions and redness    Robbie Prevention initiated by RN: Yes  Wound Care Orders initiated by RN: Yes    Pressure Injury (Stage 3,4, Unstageable, DTI, NWPT, and Complex wounds) if present, place Wound referral order by RN under : No    New Ostomies, if present place, Ostomy referral order under : No     Nurse 1 eSignature: Electronically signed by Lakeisha Thomas RN on 7/30/25 at 7:14 PM EDT    **SHARE this note so that the co-signing nurse can place an eSignature**    Nurse 2 eSignature: Electronically signed by Brielle Sutton RN on 7/30/25 at 7:17 PM EDT    
Med rec done with pharmacist over the phone at pt bedside.  
Occupational Therapy  Facility/Department: [address]  Occupational Therapy Initial Assessment and Treatment Note     Name: Kristine Ramirez  : 1975  MRN: [id number]  Date of Service: 2025    Discharge Recommendations:  Subacute/Skilled Nursing Facility  OT Equipment Recommendations  Equipment Needed: No  Other: has all indicated DME at home     Treatment Diagnosis: impaired ADLs/ functional transfers 2/2 fall / hyperkalemia    Assessment  Performance deficits / Impairments: Decreased functional mobility ;Decreased ADL status;Decreased safe awareness;Decreased endurance  Assessment: Pt from home with spouse - freq falls / ED visits 2/2 missed dialysis. Pt provides inconsistent PLOF at baseline. Pt limited by generalized fatigue, deconditioning and visual impairments. Pt currently requires Mod A for functional transfers and Mod A with LE ADls.  Pt would benefit from ongoing inpt OT at SNF. If declines recommend home with 24hr assist / HHOT.   Pt freq declines ongoing services.  Will follow as inpt to promote increased activity / independence  Treatment Diagnosis: impaired ADLs/ functional transfers 2/2 fall / hyperkalemia  Decision Making: Medium Complexity  REQUIRES OT FOLLOW-UP: Yes  Activity Tolerance  Activity Tolerance: Patient limited by fatigue;Patient Tolerated treatment well  Activity Tolerance Comments: No ALVAREZ noted.  Pt 94% on RA     Plan  Occupational Therapy Plan  Times Per Week: 2-5x  Times Per Day: Once a day  Current Treatment Recommendations: Functional mobility training, Endurance training, Patient/Caregiver education & training, Safety education & training, Self-Care / ADL    Restrictions  Position Activity Restriction  Other Position/Activity Restrictions: up with assistance    Subjective  General  Chart Reviewed: Yes  Additional Pertinent Hx: Admit  with Hyperkalemia-- chief complaints of fatigue and fall from bed. Pt per EMS freq misses dialysis 2/2 chronic pain 
Occupational Therapy/Physical Therapy Attempt    Attempted to see pt for OT/PT this AM  - pt off floor at dialysis. Will re-attempt later time vs later date as appropriate and schedules permit.    Allison Gibbons, MOT-OTR/L [id number]  Marcelina Washburn, PT      
Physical Therapy  Facility/Department: [address]  Physical Therapy Initial Assessment/treatment note    Name: Kristine Ramirez  : 1975  MRN: [id number]  Date of Service: 2025    Discharge Recommendations:  Subacute/Skilled Nursing Facility   PT Equipment Recommendations  Equipment Needed: No      Patient Diagnosis(es): The encounter diagnosis was ESRD needing dialysis (HCC).  Past Medical History:  has a past medical history of Acute respiratory failure due to COVID-19 (HCC), Arterial ischemic stroke, ICA, left, acute (HCC), Blind in both eyes, Cerebral artery occlusion with cerebral infarction (HCC), CHF (congestive heart failure) (HCC), Chronic kidney disease, COPD (chronic obstructive pulmonary disease) (HCC), Depression, Diabetes mellitus out of control, Diabetes mellitus, type II (HCC), Diabetic neuropathy associated with type 2 diabetes mellitus (HCC), Generalized headaches, Hypertension, Infertility, Insomnia, Migraine headache, Mixed hyperlipidemia, Otitis media, Pelvic abscess in female, Pneumonia, Stroke (HCC), and Stroke (HCC).  Past Surgical History:  has a past surgical history that includes Cervix surgery; eye surgery; Foot surgery (Right); Foot surgery (Bilateral); Foot surgery (Left); IR TUNNELED CVC PLACE WO SQ PORT/PUMP > 5 YEARS (2021); Dialysis fistula creation (Right, 2/10/2022); Upper gastrointestinal endoscopy (N/A, 2024); Upper gastrointestinal endoscopy (N/A, 2024); Cardiac procedure (N/A, 3/18/2025); and IR TUNNELED CVC PLACE WO SQ PORT/PUMP > 5 YEARS (2025).    Assessment  Assessment: 51 yo admitted 25 to observation due to hyperkalemia/missed dialysis/falls; per notes, pt has been to ED 6-7x since  for similar issues. Pt with varying report of her baseline function eventually admitting she spends 70% of her day in bed and typically  assists her with mobility. Pt reports she is getting weaker but does not want home PT due to 
Pt is A&Ox4 and discharging home. Pt's IV and telemetry removed. Pt's questions and concerns addressed with RN. Pt left with all personal belongings and discharge instructions. Pt transported home via lift.  
Pt off unit for dialysis  
Pt out of the room to dialysis.  
Treatment time: 3 hours and 30 minutes    Net UF: 3.5 liters    Pre weight: 71.7 kg  Post weight: 68.2 kg  EDW: 63.5 kg    Access used: Left CVC  Access function: Access site located on the Left chest wall had clean dry and intact CVC dressing. No signs of infection noted. No redness, hematoma nor swelling was observed. No discharges was seen. Both ports had good flow. Cleaned site aseptically. And changed dressing by Jovi.     Medications or blood products given: heparin dwell    Regular outpatient schedule: M,W,F    Summary of response to treatment: 14:30 Treatment set at 3.5 liters for 3 hours and 30 minutes via Left CVC. Access site located on the Left chest wall had clean dry and intact CVC dressing. No signs of infection noted. No redness, hematoma nor swelling was observed. No discharges was seen. Both ports had good flow. Cleaned site aseptically. And changed dressing by Jovi. Parameters set accordingly. Hooked to HD machine. Monitored from time to time. 18:04: Treatment completed. Returned blood aseptically. HD tolerated well.       Copy of dialysis treatment record placed in chart, to be scanned into EMR.  
Treatment time: 3.5 hours  Net UF: 2100 ml     Pre weight: 65.6 kg   Post weight: 63.5 kg  EDW: 63.5 kg     Access used: LIJ TDC  Access function: Good with  ml/min     Medications or blood products given: Heparin for catheter dwells     Regular outpatient schedule: MWF     Summary of response to treatment: 2K bath, tolerated well.      Copy of dialysis treatment record placed in chart, to be scanned into EMR. Report provided at bedside to HAYLEE Mathews     
Lunch  amLODIPine (NORVASC) tablet 5 mg, 5 mg, Oral, Daily  aspirin EC tablet 81 mg, 81 mg, Oral, Daily  busPIRone (BUSPAR) tablet 10 mg, 10 mg, Oral, BID  cephALEXin (KEFLEX) capsule 500 mg, 500 mg, Oral, BID  cloNIDine (CATAPRES) tablet 0.2 mg, 0.2 mg, Oral, TID  hydrALAZINE (APRESOLINE) tablet 50 mg, 50 mg, Oral, 3 times per day  metoprolol (LOPRESSOR) tablet 100 mg, 100 mg, Oral, BID  pantoprazole (PROTONIX) tablet 40 mg, 40 mg, Oral, BID AC  pregabalin (LYRICA) capsule 75 mg, 75 mg, Oral, Nightly  sevelamer (RENVELA) packet 2.4 g, 2.4 g, Oral, TID WC  glucose chewable tablet 16 g, 4 tablet, Oral, PRN  dextrose bolus 10% 125 mL, 125 mL, IntraVENous, PRN **OR** dextrose bolus 10% 250 mL, 250 mL, IntraVENous, PRN  glucagon injection 1 mg, 1 mg, SubCUTAneous, PRN  dextrose 10 % infusion, , IntraVENous, Continuous PRN  sodium chloride flush 0.9 % injection 5-40 mL, 5-40 mL, IntraVENous, 2 times per day  sodium chloride flush 0.9 % injection 5-40 mL, 5-40 mL, IntraVENous, PRN  0.9 % sodium chloride infusion, , IntraVENous, PRN  heparin (porcine) injection 5,000 Units, 5,000 Units, SubCUTAneous, 3 times per day  ondansetron (ZOFRAN-ODT) disintegrating tablet 4 mg, 4 mg, Oral, Q8H PRN **OR** ondansetron (ZOFRAN) injection 4 mg, 4 mg, IntraVENous, Q6H PRN  polyethylene glycol (GLYCOLAX) packet 17 g, 17 g, Oral, Daily PRN  acetaminophen (TYLENOL) tablet 650 mg, 650 mg, Oral, Q6H PRN **OR** acetaminophen (TYLENOL) suppository 650 mg, 650 mg, Rectal, Q6H PRN  insulin lispro (HUMALOG,ADMELOG) injection vial 0-4 Units, 0-4 Units, SubCUTAneous, 4x Daily AC & HS    Vitals :     Vitals:    07/31/25 0524   BP: (!) 140/73   Pulse:    Resp:    Temp:    SpO2:           Physical Examination :     appearance: Alert, orientated  Respiratory: no distress  Cardiovascular: no visibly  raised JVD, Edema +  Abdomen: -  soft  Other relevant findings: AV fistula and TDC     Labs :     CBC:   Recent Labs     07/30/25  1153   WBC 5.8   HGB 
07/30/25  1153   WBC 5.8   HGB 11.7*   HCT 35.7*        BMP:    Recent Labs     07/30/25  1153 07/30/25  1301 07/31/25  0904   *  --  134*   K see below 5.7* 4.8     --  100   CO2 17*  --  21   BUN 70*  --  45*   CREATININE 5.7*  --  4.2*   GLUCOSE 133*  --  136*   PHOS  --   --  5.4*     Lab Results   Component Value Date/Time    COLORU DARK YELLOW 07/16/2025 01:55 AM    NITRU Negative 07/16/2025 01:55 AM    GLUCOSEU 250 07/16/2025 01:55 AM    KETUA Negative 07/16/2025 01:55 AM    UROBILINOGEN 1.0 07/16/2025 01:55 AM    BILIRUBINUR Negative 07/16/2025 01:55 AM        ----------------------------------------------------------  Please call with questions at      24 Hrs Answering service [phone]  Perfect serve, or cell phone 7 am - 5pm  Merrick Robertson MD  [email or web address]    
   ORG Staphylococcus epidermidis 01/15/2022 04:55 AM         Dionna Tejada, RIVERA - CNP

## 2025-08-01 NOTE — CARE COORDINATION
[facility] called and they left message to call them about this patient. Contacted , Shahida Vora [phone]. Left message to return call. Electronically signed by Lisa Solares RN on 7/31/2025 at 4:59 PM    Addendum:  Shahida Vora,  ([facility], partnering with [facility]) called back and said they are following patient. Patient mentioned that her not going to dialysis is a defiance against her  telling her what to do.They are going to offer patient a Behavioral Health Manager to speak with (Sonya). Also, when patient goes back home, Shahida Vora is thinking about asking patient if she can go to the home to visit. CM will continue to follow patient until discharge.  Electronically signed by Lisa Solares RN on 7/31/2025 at 5:24 PM      
[place], [place] - [address] [phone] - F [phone]  [address] [place] [address]  Phone: [phone] Fax: [phone]      Assistance purchasing medications?: Potential Assistance Purchasing Medications: No  Assistance provided by Case Management: None at this time    Does patient want to participate in local refill/ meds to beds program?: Yes    Meds To Beds General Rules:  1. Can ONLY be done Monday- Friday between 8:30am-5pm  2. Prescription(s) must be in pharmacy by 3pm to be filled same day  3.Copy of patient's insurance/ prescription drug card and patient face sheet must be sent along with the prescription(s)  4. Cost of Rx cannot be added to hospital bill. If financial assistance is needed, please contact unit  or ;  or  CANNOT provide pharmacy voucher for patients co-pays  5. Patients can then  the prescription on their way out of the hospital at discharge, or pharmacy can deliver to the bedside if staff is available. (payment due at time of pick-up or delivery - cash, check, or card accepted)     Able to afford home medications/ co-pay costs: Yes    ADLS:  Current PT AM-PAC Score: 13 /24  Current OT AM-PAC Score: 17 /24    DISCHARGE Disposition: Home- No Services Needed    LOC at discharge: Not Applicable  SARAH Completed: Not Indicated    Notification completed in HENS/PAS?:  Not Applicable    IMM Completed:   Yes, Case management has presented and reviewed IMM letter #2 to the patient and/or family/ POA. Patient and/or family/POA verbalized understanding of their medicare rights and appeal process if needed. Patient and/or family/POA verbalized understanding of DC within 4 hours of signing IMM letter #2. Patient and/or family/POA has signed and placed today's date (8-1-25) and time (1515) on IMM letter #2 on the the appropriate lines. Patient and/or family/POA, provided copy of letter and they are aware that original copy of IMM 
achievement of predicted outcomes: Family support, Caregiver support, Friend support, and Cooperative    Barriers to discharge: Medical complications    Additional Case Management Notes: Patient is OBS. Patient here because she fell at home and, also, routinely missing her HD appts. Patient from home with  who is 24/7 caregiver. Patient has a hx of recurrent falls. Patient is mostly w/c bound. Patient on 7/30 was dialyzed and had 4L of fluid taken off. Spoke to patient and she states that she does not need HHC or SNF and that she wants to return home with her .  States that she uses a walker and a w/c in home. She uses a walker to get to the bathroom which is about 4- 5 feet away from her bed. Patient is going back to HD at [facility] today.        The Plan for Transition of Care is related to the following treatment goals of Hyperkalemia [E87.5]  ESRD needing dialysis (HCC) [N18.6, Z99.2]    IF APPLICABLE: The Patient and/or patient representative Kristine and her family were provided with a choice of provider and agrees with the discharge plan. Freedom of choice list with basic dialogue that supports the patient's individualized plan of care/goals and shares the quality data associated with the providers was provided to: Patient   Patient Representative Name:       The Patient and/or Patient Representative Agree with the Discharge Plan? Yes    Lisa Solares RN  Case Management Department  Ph: [phone]

## 2025-08-01 NOTE — PLAN OF CARE
Problem: Chronic Conditions and Co-morbidities  Goal: Patient's chronic conditions and co-morbidity symptoms are monitored and maintained or improved  8/1/2025 1510 by Bisi Metz RN  Outcome: Adequate for Discharge     Problem: Discharge Planning  Goal: Discharge to home or other facility with appropriate resources  8/1/2025 1510 by Bisi Metz RN  Outcome: Adequate for Discharge     Problem: Pain  Goal: Verbalizes/displays adequate comfort level or baseline comfort level  8/1/2025 1510 by Bisi Metz RN  Outcome: Adequate for Discharge     Problem: Safety - Adult  Goal: Free from fall injury  8/1/2025 1510 by Bisi Metz RN  Outcome: Adequate for Discharge     Problem: ABCDS Injury Assessment  Goal: Absence of physical injury  8/1/2025 1510 by Bisi Metz RN  Outcome: Adequate for Discharge

## 2025-08-04 ENCOUNTER — TELEPHONE (OUTPATIENT)
Dept: FAMILY MEDICINE CLINIC | Age: 50
End: 2025-08-04

## 2025-08-04 DIAGNOSIS — L03.039 CELLULITIS OF TOE, UNSPECIFIED LATERALITY: Primary | ICD-10-CM

## 2025-08-05 ENCOUNTER — TELEPHONE (OUTPATIENT)
Dept: FAMILY MEDICINE CLINIC | Age: 50
End: 2025-08-05

## 2025-08-05 RX ORDER — CEPHALEXIN 500 MG/1
500 CAPSULE ORAL DAILY
Qty: 5 CAPSULE | Refills: 0 | Status: SHIPPED | OUTPATIENT
Start: 2025-08-05 | End: 2025-08-10

## 2025-08-06 ENCOUNTER — APPOINTMENT (OUTPATIENT)
Dept: CT IMAGING | Age: 50
End: 2025-08-06
Payer: COMMERCIAL

## 2025-08-06 ENCOUNTER — HOSPITAL ENCOUNTER (EMERGENCY)
Age: 50
Discharge: HOME | End: 2025-08-07
Attending: EMERGENCY MEDICINE
Payer: COMMERCIAL

## 2025-08-06 DIAGNOSIS — G56.30 RADIAL NERVE PALSY: Primary | ICD-10-CM

## 2025-08-06 LAB
ALBUMIN SERPL-MCNC: 3.9 G/DL (ref 3.4–5)
ALBUMIN/GLOB SERPL: 1.2 {RATIO} (ref 1.1–2.2)
ALP SERPL-CCNC: 134 U/L (ref 40–129)
ALT SERPL-CCNC: 6 U/L (ref 10–40)
ANION GAP SERPL CALCULATED.3IONS-SCNC: 15 MMOL/L (ref 3–16)
AST SERPL-CCNC: 20 U/L (ref 15–37)
BASOPHILS # BLD: 0.2 K/UL (ref 0–0.2)
BASOPHILS NFR BLD: 3 %
BILIRUB SERPL-MCNC: 0.6 MG/DL (ref 0–1)
BUN SERPL-MCNC: 36 MG/DL (ref 7–20)
CALCIUM SERPL-MCNC: 8.9 MG/DL (ref 8.3–10.6)
CHLORIDE SERPL-SCNC: 95 MMOL/L (ref 99–110)
CHP ED QC CHECK: YES
CO2 SERPL-SCNC: 28 MMOL/L (ref 21–32)
CREAT SERPL-MCNC: 3.3 MG/DL (ref 0.6–1.1)
DEPRECATED RDW RBC AUTO: 17.3 % (ref 12.4–15.4)
EOSINOPHIL # BLD: 0.2 K/UL (ref 0–0.6)
EOSINOPHIL NFR BLD: 3.2 %
GFR SERPLBLD CREATININE-BSD FMLA CKD-EPI: 16 ML/MIN/{1.73_M2}
GLUCOSE BLD-MCNC: 122 MG/DL
GLUCOSE BLD-MCNC: 122 MG/DL (ref 70–99)
GLUCOSE SERPL-MCNC: 132 MG/DL (ref 70–99)
HCT VFR BLD AUTO: 33 % (ref 36–48)
HGB BLD-MCNC: 11.2 G/DL (ref 12–16)
INR PPP: 1.18 (ref 0.86–1.14)
LYMPHOCYTES # BLD: 0.8 K/UL (ref 1–5.1)
LYMPHOCYTES NFR BLD: 15.3 %
MCH RBC QN AUTO: 30.4 PG (ref 26–34)
MCHC RBC AUTO-ENTMCNC: 34 G/DL (ref 31–36)
MCV RBC AUTO: 89.5 FL (ref 80–100)
MONOCYTES # BLD: 0.5 K/UL (ref 0–1.3)
MONOCYTES NFR BLD: 10.3 %
NEUTROPHILS # BLD: 3.6 K/UL (ref 1.7–7.7)
NEUTROPHILS NFR BLD: 68.2 %
PERFORMED ON: ABNORMAL
PLATELET # BLD AUTO: 165 K/UL (ref 135–450)
PMV BLD AUTO: 7.8 FL (ref 5–10.5)
POTASSIUM SERPL-SCNC: 4.1 MMOL/L (ref 3.5–5.1)
PROT SERPL-MCNC: 7.1 G/DL (ref 6.4–8.2)
PROTHROMBIN TIME: 15.3 SEC (ref 12.1–14.9)
RBC # BLD AUTO: 3.68 M/UL (ref 4–5.2)
SODIUM SERPL-SCNC: 138 MMOL/L (ref 136–145)
TROPONIN, HIGH SENSITIVITY: 287 NG/L (ref 0–14)
WBC # BLD AUTO: 5.2 K/UL (ref 4–11)

## 2025-08-06 PROCEDURE — 36415 COLL VENOUS BLD VENIPUNCTURE: CPT

## 2025-08-06 PROCEDURE — 70496 CT ANGIOGRAPHY HEAD: CPT

## 2025-08-06 PROCEDURE — 99285 EMERGENCY DEPT VISIT HI MDM: CPT

## 2025-08-06 PROCEDURE — 70450 CT HEAD/BRAIN W/O DYE: CPT

## 2025-08-06 PROCEDURE — 93005 ELECTROCARDIOGRAM TRACING: CPT | Performed by: EMERGENCY MEDICINE

## 2025-08-06 PROCEDURE — 85610 PROTHROMBIN TIME: CPT

## 2025-08-06 PROCEDURE — 84484 ASSAY OF TROPONIN QUANT: CPT

## 2025-08-06 PROCEDURE — 85025 COMPLETE CBC W/AUTO DIFF WBC: CPT

## 2025-08-06 PROCEDURE — 80053 COMPREHEN METABOLIC PANEL: CPT

## 2025-08-06 PROCEDURE — 6360000004 HC RX CONTRAST MEDICATION: Performed by: EMERGENCY MEDICINE

## 2025-08-06 RX ORDER — IOPAMIDOL 755 MG/ML
75 INJECTION, SOLUTION INTRAVASCULAR
Status: COMPLETED | OUTPATIENT
Start: 2025-08-06 | End: 2025-08-06

## 2025-08-06 RX ADMIN — IOPAMIDOL 75 ML: 755 INJECTION, SOLUTION INTRAVENOUS at 22:29

## 2025-08-06 ASSESSMENT — PAIN DESCRIPTION - LOCATION: LOCATION: WRIST

## 2025-08-06 ASSESSMENT — ENCOUNTER SYMPTOMS
SHORTNESS OF BREATH: 0
EYE REDNESS: 0
EYE PAIN: 0
RHINORRHEA: 0
COUGH: 0
BACK PAIN: 0
DIARRHEA: 0
ABDOMINAL PAIN: 0
VOMITING: 0
CONSTIPATION: 0
NAUSEA: 0

## 2025-08-06 ASSESSMENT — PAIN DESCRIPTION - ORIENTATION: ORIENTATION: LEFT

## 2025-08-06 ASSESSMENT — PAIN SCALES - GENERAL: PAINLEVEL_OUTOF10: 0

## 2025-08-06 ASSESSMENT — PAIN - FUNCTIONAL ASSESSMENT: PAIN_FUNCTIONAL_ASSESSMENT: 0-10

## 2025-08-06 ASSESSMENT — PAIN DESCRIPTION - DESCRIPTORS: DESCRIPTORS: OTHER (COMMENT)

## 2025-08-07 VITALS
SYSTOLIC BLOOD PRESSURE: 188 MMHG | OXYGEN SATURATION: 92 % | HEART RATE: 99 BPM | HEIGHT: 67 IN | BODY MASS INDEX: 21.82 KG/M2 | TEMPERATURE: 98.2 F | WEIGHT: 139 LBS | DIASTOLIC BLOOD PRESSURE: 86 MMHG | RESPIRATION RATE: 14 BRPM

## 2025-08-07 LAB — TROPONIN, HIGH SENSITIVITY: 268 NG/L (ref 0–14)

## 2025-08-07 PROCEDURE — 84484 ASSAY OF TROPONIN QUANT: CPT

## 2025-08-08 DIAGNOSIS — I12.0 TYPE 2 DM WITH HYPERTENSION AND ESRD ON DIALYSIS (HCC): ICD-10-CM

## 2025-08-08 DIAGNOSIS — Z99.2 TYPE 2 DM WITH HYPERTENSION AND ESRD ON DIALYSIS (HCC): ICD-10-CM

## 2025-08-08 DIAGNOSIS — N18.6 TYPE 2 DM WITH HYPERTENSION AND ESRD ON DIALYSIS (HCC): ICD-10-CM

## 2025-08-08 DIAGNOSIS — E11.22 TYPE 2 DM WITH HYPERTENSION AND ESRD ON DIALYSIS (HCC): ICD-10-CM

## 2025-08-08 LAB
EKG ATRIAL RATE: 92 BPM
EKG DIAGNOSIS: NORMAL
EKG P AXIS: 40 DEGREES
EKG P-R INTERVAL: 164 MS
EKG Q-T INTERVAL: 352 MS
EKG QRS DURATION: 82 MS
EKG QTC CALCULATION (BAZETT): 435 MS
EKG R AXIS: 20 DEGREES
EKG T AXIS: 156 DEGREES
EKG VENTRICULAR RATE: 92 BPM

## 2025-08-08 PROCEDURE — 93010 ELECTROCARDIOGRAM REPORT: CPT | Performed by: INTERNAL MEDICINE

## 2025-08-11 DIAGNOSIS — Z79.4 TYPE 2 DIABETES MELLITUS WITH DIABETIC POLYNEUROPATHY, WITH LONG-TERM CURRENT USE OF INSULIN (HCC): Primary | ICD-10-CM

## 2025-08-11 DIAGNOSIS — E11.42 TYPE 2 DIABETES MELLITUS WITH DIABETIC POLYNEUROPATHY, WITH LONG-TERM CURRENT USE OF INSULIN (HCC): Primary | ICD-10-CM

## 2025-08-11 RX ORDER — PREGABALIN 75 MG/1
75 CAPSULE ORAL NIGHTLY
Qty: 90 CAPSULE | Refills: 0 | Status: SHIPPED | OUTPATIENT
Start: 2025-08-11 | End: 2025-11-09

## 2025-08-29 DIAGNOSIS — J44.9 CHRONIC OBSTRUCTIVE PULMONARY DISEASE, UNSPECIFIED COPD TYPE (HCC): ICD-10-CM

## 2025-08-29 RX ORDER — ALBUTEROL SULFATE 90 UG/1
2 INHALANT RESPIRATORY (INHALATION) EVERY 6 HOURS PRN
Qty: 18 G | Refills: 5 | Status: SHIPPED | OUTPATIENT
Start: 2025-08-29

## 2025-09-04 ENCOUNTER — OFFICE VISIT (OUTPATIENT)
Dept: FAMILY MEDICINE CLINIC | Age: 50
End: 2025-09-04
Payer: COMMERCIAL

## 2025-09-04 VITALS
DIASTOLIC BLOOD PRESSURE: 80 MMHG | HEART RATE: 85 BPM | OXYGEN SATURATION: 92 % | WEIGHT: 137 LBS | SYSTOLIC BLOOD PRESSURE: 170 MMHG | BODY MASS INDEX: 21.46 KG/M2

## 2025-09-04 DIAGNOSIS — E11.42 TYPE 2 DIABETES MELLITUS WITH DIABETIC POLYNEUROPATHY, WITH LONG-TERM CURRENT USE OF INSULIN (HCC): ICD-10-CM

## 2025-09-04 DIAGNOSIS — F41.0 GENERALIZED ANXIETY DISORDER WITH PANIC ATTACKS: Primary | ICD-10-CM

## 2025-09-04 DIAGNOSIS — Z02.89 MEDICATION MANAGEMENT CONTRACT AGREEMENT: ICD-10-CM

## 2025-09-04 DIAGNOSIS — E11.22 TYPE 2 DM WITH HYPERTENSION AND ESRD ON DIALYSIS (HCC): ICD-10-CM

## 2025-09-04 DIAGNOSIS — F41.1 GENERALIZED ANXIETY DISORDER WITH PANIC ATTACKS: Primary | ICD-10-CM

## 2025-09-04 DIAGNOSIS — I12.0 TYPE 2 DM WITH HYPERTENSION AND ESRD ON DIALYSIS (HCC): ICD-10-CM

## 2025-09-04 DIAGNOSIS — Z99.2 TYPE 2 DM WITH HYPERTENSION AND ESRD ON DIALYSIS (HCC): ICD-10-CM

## 2025-09-04 DIAGNOSIS — F13.20 BENZODIAZEPINE DEPENDENCE, CONTINUOUS (HCC): ICD-10-CM

## 2025-09-04 DIAGNOSIS — N18.6 TYPE 2 DM WITH HYPERTENSION AND ESRD ON DIALYSIS (HCC): ICD-10-CM

## 2025-09-04 DIAGNOSIS — Z79.4 TYPE 2 DIABETES MELLITUS WITH DIABETIC POLYNEUROPATHY, WITH LONG-TERM CURRENT USE OF INSULIN (HCC): ICD-10-CM

## 2025-09-04 PROCEDURE — 3077F SYST BP >= 140 MM HG: CPT | Performed by: FAMILY MEDICINE

## 2025-09-04 PROCEDURE — 2022F DILAT RTA XM EVC RTNOPTHY: CPT | Performed by: FAMILY MEDICINE

## 2025-09-04 PROCEDURE — 3017F COLORECTAL CA SCREEN DOC REV: CPT | Performed by: FAMILY MEDICINE

## 2025-09-04 PROCEDURE — G8427 DOCREV CUR MEDS BY ELIG CLIN: HCPCS | Performed by: FAMILY MEDICINE

## 2025-09-04 PROCEDURE — 4004F PT TOBACCO SCREEN RCVD TLK: CPT | Performed by: FAMILY MEDICINE

## 2025-09-04 PROCEDURE — 99214 OFFICE O/P EST MOD 30 MIN: CPT | Performed by: FAMILY MEDICINE

## 2025-09-04 PROCEDURE — 3079F DIAST BP 80-89 MM HG: CPT | Performed by: FAMILY MEDICINE

## 2025-09-04 PROCEDURE — G8420 CALC BMI NORM PARAMETERS: HCPCS | Performed by: FAMILY MEDICINE

## 2025-09-04 PROCEDURE — 3044F HG A1C LEVEL LT 7.0%: CPT | Performed by: FAMILY MEDICINE

## 2025-09-04 RX ORDER — AMLODIPINE BESYLATE 10 MG/1
10 TABLET ORAL DAILY
Qty: 90 TABLET | Refills: 3 | Status: SHIPPED | OUTPATIENT
Start: 2025-09-04 | End: 2025-09-04 | Stop reason: DRUGHIGH

## 2025-09-04 RX ORDER — AMLODIPINE BESYLATE 10 MG/1
10 TABLET ORAL DAILY
Qty: 90 TABLET | Refills: 3 | Status: SHIPPED | OUTPATIENT
Start: 2025-09-04

## 2025-09-04 RX ORDER — PREGABALIN 75 MG/1
75 CAPSULE ORAL NIGHTLY
Qty: 90 CAPSULE | Refills: 0 | Status: SHIPPED | OUTPATIENT
Start: 2025-09-04 | End: 2025-12-03

## 2025-09-04 RX ORDER — CARVEDILOL 25 MG/1
25 TABLET ORAL 2 TIMES DAILY
Qty: 180 TABLET | Refills: 3 | Status: SHIPPED | OUTPATIENT
Start: 2025-09-04

## 2025-09-04 RX ORDER — ALPRAZOLAM 2 MG/1
2 TABLET, EXTENDED RELEASE ORAL DAILY
Qty: 90 TABLET | Refills: 0 | Status: SHIPPED | OUTPATIENT
Start: 2025-09-04 | End: 2025-12-03

## 2025-09-04 ASSESSMENT — ENCOUNTER SYMPTOMS
BACK PAIN: 0
ABDOMINAL PAIN: 0
WHEEZING: 0
TROUBLE SWALLOWING: 0
COUGH: 0
BLOOD IN STOOL: 0
ABDOMINAL DISTENTION: 0
NAUSEA: 0
VOMITING: 0
RHINORRHEA: 0
CHEST TIGHTNESS: 0
SINUS PRESSURE: 0
DIARRHEA: 0
SHORTNESS OF BREATH: 0
CONSTIPATION: 0

## (undated) DEVICE — ELECTRODE PT RET AD L9FT HI MOIST COND ADH HYDRGEL CORDED

## (undated) DEVICE — CATH LAB PACK: Brand: MEDLINE INDUSTRIES, INC.

## (undated) DEVICE — INTENDED FOR TISSUE SEPARATION, AND OTHER PROCEDURES THAT REQUIRE A SHARP SURGICAL BLADE TO PUNCTURE OR CUT.: Brand: BARD-PARKER ® STAINLESS STEEL BLADES

## (undated) DEVICE — GOWN SIRUS NONREIN XL W/TWL: Brand: MEDLINE INDUSTRIES, INC.

## (undated) DEVICE — SUTURE MCRYL + SZ 4-0 L27IN ABSRB UD L19MM PS-2 3/8 CIR MCP426H

## (undated) DEVICE — GAUZE,SPONGE,4"X4",16PLY,XRAY,STRL,LF: Brand: MEDLINE

## (undated) DEVICE — STIFFEN MICRO-INTRODUCER KIT: Brand: STIFFEN MICRO-INTRODUCER KIT

## (undated) DEVICE — ADHESIVE SKIN CLSR 0.7ML TOP DERMBND ADV

## (undated) DEVICE — SUTURE VCRL + SZ 3-0 L27IN ABSRB UD L26MM SH 1/2 CIR VCP416H

## (undated) DEVICE — AMPLATZ ULTRA STIFF WIRE GUIDE: Brand: AMPLATZ

## (undated) DEVICE — CLIP LIG M BLU TI HRT SHP WIRE HORZ 180 PER BX

## (undated) DEVICE — SUTURE PERMAHAND SZ 2-0 L12X18IN NONABSORBABLE BLK SILK A185H

## (undated) DEVICE — Device: Brand: NOMOLINE™ LH ADULT NASAL CO2 CANNULA WITH O2 4M

## (undated) DEVICE — CLIP INT SM WIDE RED TI TRNSVRS GRV CHEVRON SHP W/ PRECIS

## (undated) DEVICE — MAJOR SET UP PK

## (undated) DEVICE — SUTURE BOOT: Brand: DEROYAL

## (undated) DEVICE — KIT AT-X65 ANGIOTOUCH HAND CONTROLLER

## (undated) DEVICE — LOOP,VESSEL,MAXI,BLUE,2/PK,STERILE: Brand: MEDLINE

## (undated) DEVICE — BANDAGE COBAN 4 IN COMPR W4INXL5YD FOAM COHESIVE QUIK STK SELF ADH SFT

## (undated) DEVICE — DECANTER FLD 9IN ST BG FOR ASEP TRNSF OF FLD

## (undated) DEVICE — GUIDEWIRE VASC L150CM DIA0.035IN FLX END L7CM J 3MM PTFE

## (undated) DEVICE — INTRODUCER SHTH 0.018 IN 4 FRX40 CM KT SFT TIP NIT VSI 7266V

## (undated) DEVICE — SYRINGE MED 30ML STD CLR PLAS LUERLOCK TIP N CTRL DISP

## (undated) DEVICE — SUTURE N ABSRB MONOFILAMENT 6-0 BV1 24 IN DA BLU PROLENE EP8805H

## (undated) DEVICE — SCANLAN® VASCU-STATT® SINGLE-USE BULLDOG CLAMP - MIDI ANGLED 45° (WHITE), CLAMPING PRESSURE 25-30 G (2/STERILE PKG): Brand: SCANLAN® VASCU-STATT® SINGLE-USE BULLDOG CLAMP

## (undated) DEVICE — MERCY FAIRFIELD TURNOVER KIT: Brand: MEDLINE INDUSTRIES, INC.

## (undated) DEVICE — STERILE LATEX POWDER FREE SURGICAL GLOVES WITH HYDROGEL COATING: Brand: PROTEXIS

## (undated) DEVICE — APPLICATOR PREP 26ML 0.7% IOD POVACRYLEX 74% ISO ALC ST

## (undated) DEVICE — GEL US 20GM NONIRRITATING OVERWRAPPED FILE PCH TRNSMIT

## (undated) DEVICE — Z DISCONTINUED USE 2272117 DRAPE SURG 3 QTR N INVASIVE 2 LAYR DISP

## (undated) DEVICE — SUTURE PROL SZ 6 0 L24IN NONABSORBABLE BLU C 1 L13MM 3 8 CIR EP8726H

## (undated) DEVICE — DRAPE,ANGIO,BRACH,STERILE,38X44: Brand: MEDLINE

## (undated) DEVICE — BLADE ES ELASTOMERIC COAT INSUL DURABLE BEND UPTO 90DEG

## (undated) DEVICE — PINNACLE INTRODUCER SHEATH: Brand: PINNACLE

## (undated) DEVICE — SUTURE NONABSORBABLE MONOFILAMENT 7-0 BV-1 1X24 IN PROLENE 8702H

## (undated) DEVICE — TOWEL,OR,DSP,ST,WHITE,DLX,XR,4/PK,20PK/C: Brand: MEDLINE

## (undated) DEVICE — FOGARTY - HYDRAGRIP SURGICAL - CLAMP INSERTS: Brand: FOGARTY SOFTJAW

## (undated) DEVICE — LOCK PERICARDIOCENTESIS SET: Brand: COOK

## (undated) DEVICE — 20 ML SYRINGE LUER-LOCK TIP: Brand: MONOJECT

## (undated) DEVICE — SUTURE PERMAHAND SZ 4-0 L18IN NONABSORBABLE BLK SILK BRAID A183H

## (undated) DEVICE — SOLUTION IRRIG 1000ML 0.9% SOD CHL USP POUR PLAS BTL

## (undated) DEVICE — DRAPE HND W114XL142IN BLU POLYPR W O PCH FEN CRD AND TB HLDR

## (undated) DEVICE — INTENDED TO BE USED TO OCCLUDE, RETRACT AND IDENTIFY ARTERIES, VEINS, TENDONS AND NERVES IN SURGICAL PROCEDURES: Brand: STERION®  VESSEL LOOP

## (undated) DEVICE — PAD, DEFIB, ADULT, RADIOTRANS, PHYSIO: Brand: MEDLINE

## (undated) DEVICE — FORCEPS BX L240CM JAW DIA2.4MM ORNG L CAP W/ NDL DISP RAD

## (undated) DEVICE — LOTION PREP REMV 5OZ IODO CLR TINC OF BENZ DURAPREP